# Patient Record
Sex: FEMALE | Race: BLACK OR AFRICAN AMERICAN | Employment: OTHER | URBAN - METROPOLITAN AREA
[De-identification: names, ages, dates, MRNs, and addresses within clinical notes are randomized per-mention and may not be internally consistent; named-entity substitution may affect disease eponyms.]

---

## 2017-09-04 ENCOUNTER — HOSPITAL ENCOUNTER (EMERGENCY)
Age: 62
Discharge: HOME | End: 2017-09-04
Attending: EMERGENCY MEDICINE
Payer: COMMERCIAL

## 2017-09-04 ENCOUNTER — APPOINTMENT (OUTPATIENT)
Dept: GENERAL RADIOLOGY | Age: 62
End: 2017-09-04
Attending: EMERGENCY MEDICINE
Payer: COMMERCIAL

## 2017-09-04 ENCOUNTER — APPOINTMENT (OUTPATIENT)
Dept: NUCLEAR MEDICINE | Age: 62
End: 2017-09-04
Attending: EMERGENCY MEDICINE
Payer: COMMERCIAL

## 2017-09-04 VITALS
HEART RATE: 98 BPM | BODY MASS INDEX: 31.1 KG/M2 | SYSTOLIC BLOOD PRESSURE: 135 MMHG | WEIGHT: 182.2 LBS | TEMPERATURE: 98.2 F | DIASTOLIC BLOOD PRESSURE: 63 MMHG | HEIGHT: 64 IN | OXYGEN SATURATION: 95 % | RESPIRATION RATE: 17 BRPM

## 2017-09-04 DIAGNOSIS — G89.29 CHRONIC BILATERAL LOW BACK PAIN WITHOUT SCIATICA: ICD-10-CM

## 2017-09-04 DIAGNOSIS — M54.50 CHRONIC BILATERAL LOW BACK PAIN WITHOUT SCIATICA: ICD-10-CM

## 2017-09-04 DIAGNOSIS — R07.9 CHEST PAIN, UNSPECIFIED TYPE: Primary | ICD-10-CM

## 2017-09-04 DIAGNOSIS — R73.9 HYPERGLYCEMIA: ICD-10-CM

## 2017-09-04 LAB
ALBUMIN SERPL-MCNC: 3.2 G/DL (ref 3.5–5)
ALBUMIN/GLOB SERPL: 0.7 {RATIO} (ref 1.1–2.2)
ALP SERPL-CCNC: 181 U/L (ref 45–117)
ALT SERPL-CCNC: 18 U/L (ref 12–78)
ANION GAP SERPL CALC-SCNC: 11 MMOL/L (ref 5–15)
APPEARANCE UR: CLEAR
AST SERPL-CCNC: 8 U/L (ref 15–37)
ATRIAL RATE: 106 BPM
BACTERIA URNS QL MICRO: NEGATIVE /HPF
BASOPHILS # BLD: 0 K/UL (ref 0–0.1)
BASOPHILS NFR BLD: 0 % (ref 0–1)
BILIRUB SERPL-MCNC: 0.3 MG/DL (ref 0.2–1)
BILIRUB UR QL: NEGATIVE
BUN SERPL-MCNC: 20 MG/DL (ref 6–20)
BUN/CREAT SERPL: 21 (ref 12–20)
CALCIUM SERPL-MCNC: 9.1 MG/DL (ref 8.5–10.1)
CALCULATED P AXIS, ECG09: 61 DEGREES
CALCULATED R AXIS, ECG10: -35 DEGREES
CALCULATED T AXIS, ECG11: 71 DEGREES
CHLORIDE SERPL-SCNC: 103 MMOL/L (ref 97–108)
CK MB CFR SERPL CALC: NORMAL % (ref 0–2.5)
CK MB CFR SERPL CALC: NORMAL % (ref 0–2.5)
CK MB SERPL-MCNC: <1 NG/ML (ref 5–25)
CK MB SERPL-MCNC: <1 NG/ML (ref 5–25)
CK SERPL-CCNC: 107 U/L (ref 26–192)
CK SERPL-CCNC: 108 U/L (ref 26–192)
CO2 SERPL-SCNC: 23 MMOL/L (ref 21–32)
COLOR UR: ABNORMAL
CREAT SERPL-MCNC: 0.94 MG/DL (ref 0.55–1.02)
D DIMER PPP FEU-MCNC: 0.85 MG/L FEU (ref 0–0.65)
DIAGNOSIS, 93000: NORMAL
EOSINOPHIL # BLD: 0.4 K/UL (ref 0–0.4)
EOSINOPHIL NFR BLD: 4 % (ref 0–7)
EPITH CASTS URNS QL MICRO: ABNORMAL /LPF
ERYTHROCYTE [DISTWIDTH] IN BLOOD BY AUTOMATED COUNT: 13.9 % (ref 11.5–14.5)
GLOBULIN SER CALC-MCNC: 4.5 G/DL (ref 2–4)
GLUCOSE SERPL-MCNC: 316 MG/DL (ref 65–100)
GLUCOSE UR STRIP.AUTO-MCNC: >1000 MG/DL
HCT VFR BLD AUTO: 36.8 % (ref 35–47)
HGB BLD-MCNC: 12.1 G/DL (ref 11.5–16)
HGB UR QL STRIP: ABNORMAL
HYALINE CASTS URNS QL MICRO: ABNORMAL /LPF (ref 0–5)
KETONES UR QL STRIP.AUTO: NEGATIVE MG/DL
LEUKOCYTE ESTERASE UR QL STRIP.AUTO: NEGATIVE
LYMPHOCYTES # BLD: 3.9 K/UL (ref 0.8–3.5)
LYMPHOCYTES NFR BLD: 47 % (ref 12–49)
MCH RBC QN AUTO: 30.9 PG (ref 26–34)
MCHC RBC AUTO-ENTMCNC: 32.9 G/DL (ref 30–36.5)
MCV RBC AUTO: 94.1 FL (ref 80–99)
MONOCYTES # BLD: 0.5 K/UL (ref 0–1)
MONOCYTES NFR BLD: 6 % (ref 5–13)
NEUTS SEG # BLD: 3.7 K/UL (ref 1.8–8)
NEUTS SEG NFR BLD: 43 % (ref 32–75)
NITRITE UR QL STRIP.AUTO: NEGATIVE
P-R INTERVAL, ECG05: 142 MS
PH UR STRIP: 6.5 [PH] (ref 5–8)
PLATELET # BLD AUTO: 314 K/UL (ref 150–400)
POTASSIUM SERPL-SCNC: 4.1 MMOL/L (ref 3.5–5.1)
PROT SERPL-MCNC: 7.7 G/DL (ref 6.4–8.2)
PROT UR STRIP-MCNC: 100 MG/DL
Q-T INTERVAL, ECG07: 364 MS
QRS DURATION, ECG06: 86 MS
QTC CALCULATION (BEZET), ECG08: 483 MS
RBC # BLD AUTO: 3.91 M/UL (ref 3.8–5.2)
RBC #/AREA URNS HPF: ABNORMAL /HPF (ref 0–5)
SODIUM SERPL-SCNC: 137 MMOL/L (ref 136–145)
SP GR UR REFRACTOMETRY: 1.02 (ref 1–1.03)
TROPONIN I SERPL-MCNC: <0.04 NG/ML
TROPONIN I SERPL-MCNC: <0.04 NG/ML
UA: UC IF INDICATED,UAUC: ABNORMAL
UROBILINOGEN UR QL STRIP.AUTO: 1 EU/DL (ref 0.2–1)
VENTRICULAR RATE, ECG03: 106 BPM
WBC # BLD AUTO: 8.5 K/UL (ref 3.6–11)
WBC URNS QL MICRO: ABNORMAL /HPF (ref 0–4)

## 2017-09-04 PROCEDURE — 36415 COLL VENOUS BLD VENIPUNCTURE: CPT | Performed by: EMERGENCY MEDICINE

## 2017-09-04 PROCEDURE — 93005 ELECTROCARDIOGRAM TRACING: CPT

## 2017-09-04 PROCEDURE — 96374 THER/PROPH/DIAG INJ IV PUSH: CPT

## 2017-09-04 PROCEDURE — A9540 TC99M MAA: HCPCS

## 2017-09-04 PROCEDURE — 72100 X-RAY EXAM L-S SPINE 2/3 VWS: CPT

## 2017-09-04 PROCEDURE — 85025 COMPLETE CBC W/AUTO DIFF WBC: CPT | Performed by: EMERGENCY MEDICINE

## 2017-09-04 PROCEDURE — 74011250636 HC RX REV CODE- 250/636: Performed by: EMERGENCY MEDICINE

## 2017-09-04 PROCEDURE — 99285 EMERGENCY DEPT VISIT HI MDM: CPT

## 2017-09-04 PROCEDURE — 71020 XR CHEST PA LAT: CPT

## 2017-09-04 PROCEDURE — 81001 URINALYSIS AUTO W/SCOPE: CPT | Performed by: EMERGENCY MEDICINE

## 2017-09-04 PROCEDURE — 85379 FIBRIN DEGRADATION QUANT: CPT | Performed by: EMERGENCY MEDICINE

## 2017-09-04 PROCEDURE — 82550 ASSAY OF CK (CPK): CPT | Performed by: EMERGENCY MEDICINE

## 2017-09-04 PROCEDURE — 84484 ASSAY OF TROPONIN QUANT: CPT | Performed by: EMERGENCY MEDICINE

## 2017-09-04 PROCEDURE — 80053 COMPREHEN METABOLIC PANEL: CPT | Performed by: EMERGENCY MEDICINE

## 2017-09-04 RX ORDER — HYDROMORPHONE HYDROCHLORIDE 1 MG/ML
0.5 INJECTION, SOLUTION INTRAMUSCULAR; INTRAVENOUS; SUBCUTANEOUS
Status: COMPLETED | OUTPATIENT
Start: 2017-09-04 | End: 2017-09-04

## 2017-09-04 RX ORDER — TRAMADOL HYDROCHLORIDE 50 MG/1
50 TABLET ORAL
Qty: 10 TAB | Refills: 0 | Status: SHIPPED | OUTPATIENT
Start: 2017-09-04 | End: 2017-09-14

## 2017-09-04 RX ADMIN — HYDROMORPHONE HYDROCHLORIDE 0.5 MG: 1 INJECTION, SOLUTION INTRAMUSCULAR; INTRAVENOUS; SUBCUTANEOUS at 02:37

## 2017-09-04 NOTE — ED TRIAGE NOTES
Triage: Patient arrives ambulatory from home with c/o muscle pain on right side of back from shoulders down to flank, patient reports pain is worse with inspiration and better with palpation. Patient also states that on the walk up here she \"got some chest pain\". Hx MI as reported by patient \"a while ago\".  Dr. Diana Lopez is pts cardiologist.

## 2017-09-04 NOTE — ED PROVIDER NOTES
HPI Comments: 60 y.o. female with past medical history significant for DM, HTN, Asthma, CAD, PAD with stents placed to legs Jerald Morales, 2017), Hysterectomy who presents from home accompanied by siblings with chief complaint of back pain. Pt reports \"couple months\" hx right mid to low back pain accompanied by b/l leg pain and numbness to feet. Pt states that the numbness has been gradually radiating up her lower legs. Pt states that she has been evaluated by her PCP for this numbness in the past and it was deamed diabetes related. Pt further notes, while en route to ED she developed left sided chest pain beneath her left breast of which is exacerbated with deep inhalation and movement. She denies hx of similar symptoms. Pt denies abdominal pain, nausea, vomiting, diarrhea or fever. No significant SOB. No known injury. She notes hx of stool incontinence of which surgical intervention has been discussed with her by PCP. She denies an exacerbation of these symptoms. No urinary incontience, difficulty urinating, dysuria, hematuria or anyo ther acute medical concerns at this time. PCP: Norma Beverly MD    Note written by Pradip Angela, as dictated by Eder Carbajal MD 12:54 AM    The history is provided by the patient. No  was used. Past Medical History:   Diagnosis Date    Asthma     CAD (coronary artery disease)     Diabetes ([facility] [address]     Hypertension        Past Surgical History:   Procedure Laterality Date    DELIVERY       HX CORONARY STENT PLACEMENT      HX HYSTERECTOMY           History reviewed. No pertinent family history. Social History     Social History    Marital status: SINGLE     Spouse name: N/A    Number of children: N/A    Years of education: N/A     Occupational History    Not on file.      Social History Main Topics    Smoking status: Current Every Day Smoker     Packs/day: 0.25    Smokeless tobacco: Not on file    Alcohol use No    Drug use: Not on file    Sexual activity: Not on file     Other Topics Concern    Not on file     Social History Narrative         ALLERGIES: Ivp dye [fd and c blue no. 1]    Review of Systems   Constitutional: Negative for fever. Respiratory: Negative for shortness of breath. Cardiovascular: Positive for chest pain (left, beneath breast). Gastrointestinal: Negative for abdominal pain, blood in stool, constipation, diarrhea, nausea and vomiting. Genitourinary: Negative for difficulty urinating. Musculoskeletal: Positive for back pain. Neurological: Positive for numbness (feet).        Vitals:    09/04/17 0016   BP: 178/90   Pulse: (!) 107   Resp: 18   Temp: 99 °F (37.2 °C)   SpO2: 99%   Weight: 82.6 kg (182 lb 3.2 oz)   Height: 5' 4\" (1.626 m)            Physical Exam   Physical Examination: General appearance - alert, well appearing, and in no distress, oriented to person, place, and time and normal appearing weight  Eyes - pupils equal and reactive, extraocular eye movements intact  Neck - supple, no significant adenopathy  Chest - clear to auscultation, no wheezes, rales or rhonchi, symmetric air entry, mild tenderness to left lower anterior chest wall, no rash, no crepitus or subcutaneous air  Heart - normal rate, regular rhythm, normal S1, S2, no murmurs, rubs, clicks or gallops  Abdomen - soft, nontender, nondistended, no masses or organomegaly  Back exam - full range of motion, no midline spinal tenderness, mild right paraspinal muscle tenderness to lumbar region  Neurological - alert, oriented, normal speech, no focal findings or movement disorder noted  Musculoskeletal - no joint tenderness, deformity or swelling  Extremities - diminished pulses to b/l LE, easily detectable by doppler, no pedal edema, no clubbing or cyanosis  Skin - normal coloration and turgor, no rashes, no suspicious skin lesions noted  MDM  Number of Diagnoses or Management Options     Amount and/or Complexity of Data Reviewed  Clinical lab tests: ordered and reviewed  Tests in the radiology section of CPT®: ordered and reviewed  Decide to obtain previous medical records or to obtain history from someone other than the patient: yes  Obtain history from someone other than the patient: yes (family)  Review and summarize past medical records: yes  Independent visualization of images, tracings, or specimens: yes    Patient Progress  Patient progress: improved    ED Course       Procedures    EKG interpretation: (Preliminary)  Rhythm: sinus tachycardia; and regular . Rate (approx.): 106; Axis: left axis deviation; DE interval: normal; QRS interval: normal ; ST/T wave: non-specific changes; no changes from EKG 6/6/2016    Pt feeling better. VSS. Discussed with radiology, LAURI Montano low prob for PE. Will d/c with pcp f/u.

## 2017-09-04 NOTE — DISCHARGE INSTRUCTIONS
Back Pain: Care Instructions  Your Care Instructions    Back pain has many possible causes. It is often related to problems with muscles and ligaments of the back. It may also be related to problems with the nerves, discs, or bones of the back. Moving, lifting, standing, sitting, or sleeping in an awkward way can strain the back. Sometimes you don't notice the injury until later. Arthritis is another common cause of back pain. Although it may hurt a lot, back pain usually improves on its own within several weeks. Most people recover in 12 weeks or less. Using good home treatment and being careful not to stress your back can help you feel better sooner. Follow-up care is a key part of your treatment and safety. Be sure to make and go to all appointments, and call your doctor if you are having problems. Its also a good idea to know your test results and keep a list of the medicines you take. How can you care for yourself at home? · Sit or lie in positions that are most comfortable and reduce your pain. Try one of these positions when you lie down:  ¨ Lie on your back with your knees bent and supported by large pillows. ¨ Lie on the floor with your legs on the seat of a sofa or chair. Blima Naegeli on your side with your knees and hips bent and a pillow between your legs. ¨ Lie on your stomach if it does not make pain worse. · Do not sit up in bed, and avoid soft couches and twisted positions. Bed rest can help relieve pain at first, but it delays healing. Avoid bed rest after the first day of back pain. · Change positions every 30 minutes. If you must sit for long periods of time, take breaks from sitting. Get up and walk around, or lie in a comfortable position. · Try using a heating pad on a low or medium setting for 15 to 20 minutes every 2 or 3 hours. Try a warm shower in place of one session with the heating pad. · You can also try an ice pack for 10 to 15 minutes every 2 to 3 hours.  Put a thin cloth between the ice pack and your skin. · Take pain medicines exactly as directed. ¨ If the doctor gave you a prescription medicine for pain, take it as prescribed. ¨ If you are not taking a prescription pain medicine, ask your doctor if you can take an over-the-counter medicine. · Take short walks several times a day. You can start with 5 to 10 minutes, 3 or 4 times a day, and work up to longer walks. Walk on level surfaces and avoid hills and stairs until your back is better. · Return to work and other activities as soon as you can. Continued rest without activity is usually not good for your back. · To prevent future back pain, do exercises to stretch and strengthen your back and stomach. Learn how to use good posture, safe lifting techniques, and proper body mechanics. When should you call for help? Call your doctor now or seek immediate medical care if:  · You have new or worsening numbness in your legs. · You have new or worsening weakness in your legs. (This could make it hard to stand up.)  · You lose control of your bladder or bowels. Watch closely for changes in your health, and be sure to contact your doctor if:  · Your pain gets worse. · You are not getting better after 2 weeks. Where can you learn more? Go to http://percy-jeremie.info/. Enter S836 in the search box to learn more about \"Back Pain: Care Instructions. \"  Current as of: March 21, 2017  Content Version: 11.3  © 2358-4352 [employer]. Care instructions adapted under license by [employer] (which disclaims liability or warranty for this information). If you have questions about a medical condition or this instruction, always ask your healthcare professional. [address] any warranty or liability for your use of this information. Chest Pain: Care Instructions  Your Care Instructions  There are many things that can cause chest pain.  Some are not serious and will get better on their own in a few days. But some kinds of chest pain need more testing and treatment. Your doctor may have recommended a follow-up visit in the next 8 to 12 hours. If you are not getting better, you may need more tests or treatment. Even though your doctor has released you, you still need to watch for any problems. The doctor carefully checked you, but sometimes problems can develop later. If you have new symptoms or if your symptoms do not get better, get medical care right away. If you have worse or different chest pain or pressure that lasts more than 5 minutes or you passed out (lost consciousness), call 911 or seek other emergency help right away. A medical visit is only one step in your treatment. Even if you feel better, you still need to do what your doctor recommends, such as going to all suggested follow-up appointments and taking medicines exactly as directed. This will help you recover and help prevent future problems. How can you care for yourself at home? · Rest until you feel better. · Take your medicine exactly as prescribed. Call your doctor if you think you are having a problem with your medicine. · Do not drive after taking a prescription pain medicine. When should you call for help? Call 911 if:  · You passed out (lost consciousness). · You have severe difficulty breathing. · You have symptoms of a heart attack. These may include:  ¨ Chest pain or pressure, or a strange feeling in your chest.  ¨ Sweating. ¨ Shortness of breath. ¨ Nausea or vomiting. ¨ Pain, pressure, or a strange feeling in your back, neck, jaw, or upper belly or in one or both shoulders or arms. ¨ Lightheadedness or sudden weakness. ¨ A fast or irregular heartbeat. After you call 911, the  may tell you to chew 1 adult-strength or 2 to 4 low-dose aspirin. Wait for an ambulance. Do not try to drive yourself. Call your doctor today if:  · You have any trouble breathing.   · Your chest pain gets worse. · You are dizzy or lightheaded, or you feel like you may faint. · You are not getting better as expected. · You are having new or different chest pain. Where can you learn more? Go to http://percy-jeremie.info/. Enter A120 in the search box to learn more about \"Chest Pain: Care Instructions. \"  Current as of: March 20, 2017  Content Version: 11.3  © 1172-7200 [employer]. Care instructions adapted under license by [employer] (which disclaims liability or warranty for this information). If you have questions about a medical condition or this instruction, always ask your healthcare professional. [place] [address] any warranty or liability for your use of this information. Back Pain, Emergency or Urgent Symptoms: Care Instructions  Your Care Instructions  Many people have back pain at one time or another. In most cases, pain gets better with self-care that includes over-the-counter pain medicine, ice, heat, and exercises. Unless you have symptoms of a severe injury or heart attack, you may be able to give yourself a few days before you call a doctor. But some back problems are very serious. Do not ignore symptoms that need to be checked right away. Follow-up care is a key part of your treatment and safety. Be sure to make and go to all appointments, and call your doctor if you are having problems. It's also a good idea to know your test results and keep a list of the medicines you take. How can you care for yourself at home? · Sit or lie in positions that are most comfortable and that reduce your pain. Try one of these positions when you lie down:  ¨ Lie on your back with your knees bent and supported by large pillows. ¨ Lie on the floor with your legs on the seat of a sofa or chair. Sonda Katie on your side with your knees and hips bent and a pillow between your legs. ¨ Lie on your stomach if it does not make pain worse.   · Do not sit up in bed, and avoid soft couches and twisted positions. Bed rest can help relieve pain at first, but it delays healing. Avoid bed rest after the first day. · Change positions every 30 minutes. If you must sit for long periods of time, take breaks from sitting. Get up and walk around, or lie flat. · Try using a heating pad on a low or medium setting, for 15 to 20 minutes every 2 or 3 hours. Try a warm shower in place of one session with the heating pad. You can also buy single-use heat wraps that last up to 8 hours. You can also try ice or cold packs on your back for 10 to 20 minutes at a time, several times a day. (Put a thin cloth between the ice pack and your skin.) This reduces pain and makes it easier to be active and exercise. · Take pain medicines exactly as directed. ¨ If the doctor gave you a prescription medicine for pain, take it as prescribed. ¨ If you are not taking a prescription pain medicine, ask your doctor if you can take an over-the-counter medicine. When should you call for help? Call 911 anytime you think you may need emergency care. For example, call if:  · You are unable to move a leg at all. · You have back pain with severe belly pain. · You have symptoms of a heart attack. These may include:  ¨ Chest pain or pressure, or a strange feeling in the chest.  ¨ Sweating. ¨ Shortness of breath. ¨ Nausea or vomiting. ¨ Pain, pressure, or a strange feeling in the back, neck, jaw, or upper belly or in one or both shoulders or arms. ¨ Lightheadedness or sudden weakness. ¨ A fast or irregular heartbeat. After you call 911, the  may tell you to chew 1 adult-strength or 2 to 4 low-dose aspirin. Wait for an ambulance. Do not try to drive yourself. Call your doctor now or seek immediate medical care if:  · You have new or worse symptoms in your arms, legs, chest, belly, or buttocks. Symptoms may include:  ¨ Numbness or tingling. ¨ Weakness. ¨ Pain.   · You lose bladder or bowel control. · You have back pain and:  ¨ You have injured your back while lifting or doing some other activity. Call if the pain is severe, has not gone away after 1 or 2 days, and you cannot do your normal daily activities. ¨ You have had a back injury before that needed treatment. ¨ Your pain has lasted longer than 4 weeks. ¨ You have had weight loss you cannot explain. ¨ You are age 48 or older. ¨ You have cancer now or have had it before. Watch closely for changes in your health, and be sure to contact your doctor if you are not getting better as expected. Where can you learn more? Go to http://percyNetadminjeremie.info/. Enter M033 in the search box to learn more about \"Back Pain, Emergency or Urgent Symptoms: Care Instructions. \"  Current as of: March 20, 2017  Content Version: 11.3  © 3150-1095 [employer]. Care instructions adapted under license by [employer] (which disclaims liability or warranty for this information). If you have questions about a medical condition or this instruction, always ask your healthcare professional. [address] any warranty or liability for your use of this information. Learning About High Blood Sugar  What is high blood sugar? Your body turns the food you eat into glucose (sugar), which it uses for energy. But if your body isn't able to use the sugar right away, it can build up in your blood and lead to high blood sugar. When the amount of sugar in your blood stays too high for too much of the time, you may have diabetes. Diabetes is a disease that can cause serious health problems. The good news is that lifestyle changes may help you get your blood sugar back to normal and avoid or delay diabetes. What causes high blood sugar? Sugar (glucose) can build up in your blood if you:  · Are overweight. · Have a family history of diabetes. · Take certain medicines, such as steroids.   What are the symptoms? Having high blood sugar may not cause any symptoms at all. Or it may make you feel very thirsty or very hungry. You may also urinate more often than usual, have blurry vision, or lose weight without trying. How is high blood sugar treated? You can take steps to lower your blood sugar level if you understand what makes it get higher. Your doctor may want you to learn how to test your blood sugar level at home. Then you can see how illness, stress, or different kinds of food or medicine raise or lower your blood sugar level. Other tests may be needed to see if you have diabetes. How can you prevent high blood sugar? · Watch your weight. If you're overweight, losing just a small amount of weight may help. Reducing fat around your waist is most important. · Limit the amount of calories, sweets, and unhealthy fat you eat. Ask your doctor if a dietitian can help you. A registered dietitian can help you create meal plans that fit your lifestyle. · Get at least 30 minutes of exercise on most days of the week. Exercise helps control your blood sugar. It also helps you maintain a healthy weight. Walking is a good choice. You also may want to do other activities, such as running, swimming, cycling, or playing tennis or team sports. · If your doctor prescribed medicines, take them exactly as prescribed. Call your doctor if you think you are having a problem with your medicine. You will get more details on the specific medicines your doctor prescribes. Follow-up care is a key part of your treatment and safety. Be sure to make and go to all appointments, and call your doctor if you are having problems. It's also a good idea to know your test results and keep a list of the medicines you take. Where can you learn more? Go to http://percy-jeremie.info/. Enter O108 in the search box to learn more about \"Learning About High Blood Sugar. \"  Current as of: March 13, 2017  Content Version: 11.3  © 4138-8825 [employer]. Care instructions adapted under license by [employer] (which disclaims liability or warranty for this information). If you have questions about a medical condition or this instruction, always ask your healthcare professional. [address] any warranty or liability for your use of this information. We hope that we have addressed all of your medical concerns. The examination and treatment you received in the Emergency Department were for an emergent problem and were not intended as complete care. It is important that you follow up with your healthcare provider(s) for ongoing care. If your symptoms worsen or do not improve as expected, and you are unable to reach your usual health care provider(s), you should return to the Emergency Department. Today's healthcare is undergoing tremendous change, and patient satisfaction surveys are one of the many tools to assess the quality of medical care. You may receive a survey from the [employer] regarding your experience in the Emergency Department. I hope that your experience has been completely positive, particularly the medical care that I provided. As such, please participate in the survey; anything less than excellent does not meet my expectations or intentions. [address] [facility] and [address] [place] [address] participate in nationally recognized quality of care measures. If your blood pressure is greater than 120/80, as reported below, we urge that you seek medical care to address the potential of high blood pressure, commonly known as hypertension. Hypertension can be hereditary or can be caused by certain medical conditions, pain, stress, or \"white coat syndrome. \"       Please make an appointment with your health care provider(s) for follow up of your Emergency Department visit.        VITALS:   Patient Vitals for the past 8 hrs:   Temp Pulse Resp BP SpO2   09/04/17 0230 98.2 °F (36.8 °C) 98 22 143/80 96 %   09/04/17 0200 - 97 22 148/80 97 %   09/04/17 0145 - (!) 101 21 - 97 %   09/04/17 0130 - (!) 102 21 159/88 97 %   09/04/17 0117 - (!) 104 18 162/82 99 %   09/04/17 0030 - (!) 105 17 (!) 171/94 98 %   09/04/17 0016 99 °F (37.2 °C) (!) 107 18 178/90 99 %   09/04/17 0015 - - - 178/90 94 %          Thank you for allowing us to provide you with medical care today. We realize that you have many choices for your emergency care needs. Please choose us in the future for any continued health care needs. Yu Verma Marylene Nixon, [address] [place] [address] 20.   Office: [phone]            Recent Results (from the past 24 hour(s))   EKG, 12 LEAD, INITIAL    Collection Time: 09/04/17 12:17 AM   Result Value Ref Range    Ventricular Rate 106 BPM    Atrial Rate 106 BPM    P-R Interval 142 ms    QRS Duration 86 ms    Q-T Interval 364 ms    QTC Calculation (Bezet) 483 ms    Calculated P Axis 61 degrees    Calculated R Axis -35 degrees    Calculated T Axis 71 degrees    Diagnosis       Sinus tachycardia  Left axis deviation  When compared with ECG of 06-JUN-2016 20:15,  No significant change was found     CBC WITH AUTOMATED DIFF    Collection Time: 09/04/17 12:20 AM   Result Value Ref Range    WBC 8.5 3.6 - 11.0 K/uL    RBC 3.91 3.80 - 5.20 M/uL    HGB 12.1 11.5 - 16.0 g/dL    HCT 36.8 35.0 - 47.0 %    MCV 94.1 80.0 - 99.0 FL    MCH 30.9 26.0 - 34.0 PG    MCHC 32.9 30.0 - 36.5 g/dL    RDW 13.9 11.5 - 14.5 %    PLATELET 896 479 - 496 K/uL    NEUTROPHILS 43 32 - 75 %    LYMPHOCYTES 47 12 - 49 %    MONOCYTES 6 5 - 13 %    EOSINOPHILS 4 0 - 7 %    BASOPHILS 0 0 - 1 %    ABS. NEUTROPHILS 3.7 1.8 - 8.0 K/UL    ABS. LYMPHOCYTES 3.9 (H) 0.8 - 3.5 K/UL    ABS. MONOCYTES 0.5 0.0 - 1.0 K/UL    ABS. EOSINOPHILS 0.4 0.0 - 0.4 K/UL    ABS.  BASOPHILS 0.0 0.0 - 0.1 K/UL   METABOLIC PANEL, COMPREHENSIVE    Collection Time: 09/04/17 12:20 AM   Result Value Ref Range    Sodium 137 136 - 145 mmol/L    Potassium 4.1 3.5 - 5.1 mmol/L    Chloride 103 97 - 108 mmol/L    CO2 23 21 - 32 mmol/L    Anion gap 11 5 - 15 mmol/L    Glucose 316 (H) 65 - 100 mg/dL    BUN 20 6 - 20 MG/DL    Creatinine 0.94 0.55 - 1.02 MG/DL    BUN/Creatinine ratio 21 (H) 12 - 20      GFR est AA >60 >60 ml/min/1.73m2    GFR est non-AA >60 >60 ml/min/1.73m2    Calcium 9.1 8.5 - 10.1 MG/DL    Bilirubin, total 0.3 0.2 - 1.0 MG/DL    ALT (SGPT) 18 12 - 78 U/L    AST (SGOT) 8 (L) 15 - 37 U/L    Alk.  phosphatase 181 (H) 45 - 117 U/L    Protein, total 7.7 6.4 - 8.2 g/dL    Albumin 3.2 (L) 3.5 - 5.0 g/dL    Globulin 4.5 (H) 2.0 - 4.0 g/dL    A-G Ratio 0.7 (L) 1.1 - 2.2     TROPONIN I    Collection Time: 09/04/17 12:20 AM   Result Value Ref Range    Troponin-I, Qt. <0.04 <0.05 ng/mL   D DIMER    Collection Time: 09/04/17 12:20 AM   Result Value Ref Range    D-dimer 0.85 (H) 0.00 - 0.65 mg/L FEU   CK W/ CKMB & INDEX    Collection Time: 09/04/17 12:20 AM   Result Value Ref Range     26 - 192 U/L    CK - MB <1.0 <3.6 NG/ML    CK-MB Index Cannot be calculated 0 - 2.5     URINALYSIS W/ REFLEX CULTURE    Collection Time: 09/04/17  1:16 AM   Result Value Ref Range    Color YELLOW/STRAW      Appearance CLEAR CLEAR      Specific gravity 1.016 1.003 - 1.030      pH (UA) 6.5 5.0 - 8.0      Protein 100 (A) NEG mg/dL    Glucose >1000 (A) NEG mg/dL    Ketone NEGATIVE  NEG mg/dL    Bilirubin NEGATIVE  NEG      Blood SMALL (A) NEG      Urobilinogen 1.0 0.2 - 1.0 EU/dL    Nitrites NEGATIVE  NEG      Leukocyte Esterase NEGATIVE  NEG      WBC 0-4 0 - 4 /hpf    RBC 0-5 0 - 5 /hpf    Epithelial cells FEW FEW /lpf    Bacteria NEGATIVE  NEG /hpf    UA:UC IF INDICATED CULTURE NOT INDICATED BY UA RESULT CNI      Hyaline cast 0-2 0 - 5 /lpf   CK W/ CKMB & INDEX    Collection Time: 09/04/17  3:55 AM   Result Value Ref Range     26 - 192 U/L    CK - MB <1.0 <3.6 NG/ML    CK-MB Index Cannot be calculated 0 - 2.5     TROPONIN I Collection Time: 09/04/17  3:55 AM   Result Value Ref Range    Troponin-I, Qt. <0.04 <0.05 ng/mL       Xr Chest Pa Lat    Result Date: 9/4/2017  EXAM:  XR CHEST PA LAT INDICATION:   Chest Pain COMPARISON: Chest x-ray 6/6/2016. CTA thorax 2/20/2014. FINDINGS: PA and lateral radiographs of the chest demonstrate clear lungs. The cardiac and mediastinal contours and pulmonary vascularity are normal.  The bones and soft tissues are within normal limits apart from mild degenerative spine change. IMPRESSION: Acute findings. Xr Spine Lumb 2 Or 3 V    Result Date: 9/4/2017  EXAM:  XR SPINE LUMB 2 OR 3 V INDICATION:   back pain COMPARISON: None. FINDINGS: AP, lateral and spot lateral views of the lumbar spine demonstrate normal alignment. The vertebral body heights are well-maintained. There is preservation of disc height with mild facet arthropathy. There is diffuse osteopenia. Vascular stents are seen overlying the L4 level and left sacrum. Soft tissues appear unremarkable. There is no fracture, subluxation or other abnormality. IMPRESSION:  No acute findings.

## 2018-03-15 ENCOUNTER — APPOINTMENT (OUTPATIENT)
Dept: CT IMAGING | Age: 63
DRG: 193 | End: 2018-03-15
Attending: FAMILY MEDICINE
Payer: COMMERCIAL

## 2018-03-15 ENCOUNTER — HOSPITAL ENCOUNTER (INPATIENT)
Age: 63
LOS: 4 days | Discharge: HOME | DRG: 193 | End: 2018-03-19
Attending: EMERGENCY MEDICINE | Admitting: FAMILY MEDICINE
Payer: COMMERCIAL

## 2018-03-15 ENCOUNTER — APPOINTMENT (OUTPATIENT)
Dept: GENERAL RADIOLOGY | Age: 63
DRG: 193 | End: 2018-03-15
Attending: PHYSICIAN ASSISTANT
Payer: COMMERCIAL

## 2018-03-15 DIAGNOSIS — F17.200 SMOKING: ICD-10-CM

## 2018-03-15 DIAGNOSIS — J44.1 ACUTE EXACERBATION OF CHRONIC OBSTRUCTIVE PULMONARY DISEASE (COPD) (HCC): Primary | ICD-10-CM

## 2018-03-15 PROBLEM — R65.10 SIRS (SYSTEMIC INFLAMMATORY RESPONSE SYNDROME) (HCC): Status: ACTIVE | Noted: 2018-03-15

## 2018-03-15 PROBLEM — J45.901 ASTHMA EXACERBATION: Status: ACTIVE | Noted: 2018-03-15

## 2018-03-15 PROBLEM — R50.9 FEVER: Status: ACTIVE | Noted: 2018-03-15

## 2018-03-15 PROBLEM — R60.0 BILATERAL LEG EDEMA: Status: ACTIVE | Noted: 2018-03-15

## 2018-03-15 LAB
ALBUMIN SERPL-MCNC: 2.6 G/DL (ref 3.5–5)
ALBUMIN/GLOB SERPL: 0.6 {RATIO} (ref 1.1–2.2)
ALP SERPL-CCNC: 137 U/L (ref 45–117)
ALT SERPL-CCNC: 20 U/L (ref 12–78)
ANION GAP SERPL CALC-SCNC: 7 MMOL/L (ref 5–15)
APPEARANCE UR: CLEAR
ARTERIAL PATENCY WRIST A: YES
AST SERPL-CCNC: 28 U/L (ref 15–37)
BACTERIA URNS QL MICRO: ABNORMAL /HPF
BASE EXCESS BLD CALC-SCNC: 0 MMOL/L
BASOPHILS # BLD: 0.1 K/UL (ref 0–0.1)
BASOPHILS NFR BLD: 1 % (ref 0–1)
BDY SITE: ABNORMAL
BILIRUB DIRECT SERPL-MCNC: <0.1 MG/DL (ref 0–0.2)
BILIRUB SERPL-MCNC: 0.3 MG/DL (ref 0.2–1)
BILIRUB UR QL: NEGATIVE
BNP SERPL-MCNC: 897 PG/ML (ref 0–125)
BUN SERPL-MCNC: 16 MG/DL (ref 6–20)
BUN/CREAT SERPL: 15 (ref 12–20)
CALCIUM SERPL-MCNC: 8.2 MG/DL (ref 8.5–10.1)
CHLORIDE SERPL-SCNC: 103 MMOL/L (ref 97–108)
CO2 SERPL-SCNC: 28 MMOL/L (ref 21–32)
COLOR UR: ABNORMAL
CREAT SERPL-MCNC: 1.08 MG/DL (ref 0.55–1.02)
DIFFERENTIAL METHOD BLD: ABNORMAL
EOSINOPHIL # BLD: 0.2 K/UL (ref 0–0.4)
EOSINOPHIL NFR BLD: 2 % (ref 0–7)
EPITH CASTS URNS QL MICRO: ABNORMAL /LPF
ERYTHROCYTE [DISTWIDTH] IN BLOOD BY AUTOMATED COUNT: 14.1 % (ref 11.5–14.5)
GAS FLOW.O2 O2 DELIVERY SYS: ABNORMAL L/MIN
GLOBULIN SER CALC-MCNC: 4.6 G/DL (ref 2–4)
GLUCOSE BLD STRIP.AUTO-MCNC: 168 MG/DL (ref 65–100)
GLUCOSE SERPL-MCNC: 227 MG/DL (ref 65–100)
GLUCOSE UR STRIP.AUTO-MCNC: 250 MG/DL
HCO3 BLD-SCNC: 23.9 MMOL/L (ref 22–26)
HCT VFR BLD AUTO: 34.6 % (ref 35–47)
HGB BLD-MCNC: 11.5 G/DL (ref 11.5–16)
HGB UR QL STRIP: ABNORMAL
IMM GRANULOCYTES # BLD: 0 K/UL (ref 0–0.04)
IMM GRANULOCYTES NFR BLD AUTO: 0 % (ref 0–0.5)
KETONES UR QL STRIP.AUTO: NEGATIVE MG/DL
LACTATE SERPL-SCNC: 2 MMOL/L (ref 0.4–2)
LEUKOCYTE ESTERASE UR QL STRIP.AUTO: NEGATIVE
LYMPHOCYTES # BLD: 1.3 K/UL (ref 0.8–3.5)
LYMPHOCYTES NFR BLD: 16 % (ref 12–49)
MCH RBC QN AUTO: 32.1 PG (ref 26–34)
MCHC RBC AUTO-ENTMCNC: 33.2 G/DL (ref 30–36.5)
MCV RBC AUTO: 96.6 FL (ref 80–99)
MONOCYTES # BLD: 0.6 K/UL (ref 0–1)
MONOCYTES NFR BLD: 8 % (ref 5–13)
NEUTS SEG # BLD: 5.9 K/UL (ref 1.8–8)
NEUTS SEG NFR BLD: 73 % (ref 32–75)
NITRITE UR QL STRIP.AUTO: NEGATIVE
NRBC # BLD: 0 K/UL (ref 0–0.01)
NRBC BLD-RTO: 0 PER 100 WBC
PCO2 BLD: 33.2 MMHG (ref 35–45)
PH BLD: 7.46 [PH] (ref 7.35–7.45)
PH UR STRIP: 6.5 [PH] (ref 5–8)
PLATELET # BLD AUTO: 264 K/UL (ref 150–400)
PMV BLD AUTO: 9.1 FL (ref 8.9–12.9)
PO2 BLD: 68 MMHG (ref 80–100)
POTASSIUM SERPL-SCNC: 4 MMOL/L (ref 3.5–5.1)
PROT SERPL-MCNC: 7.2 G/DL (ref 6.4–8.2)
PROT UR STRIP-MCNC: >300 MG/DL
RBC # BLD AUTO: 3.58 M/UL (ref 3.8–5.2)
RBC #/AREA URNS HPF: ABNORMAL /HPF (ref 0–5)
SAO2 % BLD: 94 % (ref 92–97)
SERVICE CMNT-IMP: ABNORMAL
SODIUM SERPL-SCNC: 138 MMOL/L (ref 136–145)
SP GR UR REFRACTOMETRY: 1.02 (ref 1–1.03)
SPECIMEN TYPE: ABNORMAL
TROPONIN I SERPL-MCNC: <0.04 NG/ML
UR CULT HOLD, URHOLD: NORMAL
UROBILINOGEN UR QL STRIP.AUTO: 2 EU/DL (ref 0.2–1)
WBC # BLD AUTO: 8 K/UL (ref 3.6–11)
WBC URNS QL MICRO: ABNORMAL /HPF (ref 0–4)
YEAST BUDDING URNS QL: PRESENT

## 2018-03-15 PROCEDURE — 71250 CT THORAX DX C-: CPT

## 2018-03-15 PROCEDURE — 82803 BLOOD GASES ANY COMBINATION: CPT

## 2018-03-15 PROCEDURE — 83880 ASSAY OF NATRIURETIC PEPTIDE: CPT | Performed by: EMERGENCY MEDICINE

## 2018-03-15 PROCEDURE — 74011250637 HC RX REV CODE- 250/637: Performed by: EMERGENCY MEDICINE

## 2018-03-15 PROCEDURE — 93005 ELECTROCARDIOGRAM TRACING: CPT

## 2018-03-15 PROCEDURE — 96374 THER/PROPH/DIAG INJ IV PUSH: CPT

## 2018-03-15 PROCEDURE — 83605 ASSAY OF LACTIC ACID: CPT | Performed by: EMERGENCY MEDICINE

## 2018-03-15 PROCEDURE — 74011250636 HC RX REV CODE- 250/636: Performed by: FAMILY MEDICINE

## 2018-03-15 PROCEDURE — 94640 AIRWAY INHALATION TREATMENT: CPT

## 2018-03-15 PROCEDURE — 80076 HEPATIC FUNCTION PANEL: CPT | Performed by: FAMILY MEDICINE

## 2018-03-15 PROCEDURE — 80048 BASIC METABOLIC PNL TOTAL CA: CPT | Performed by: PHYSICIAN ASSISTANT

## 2018-03-15 PROCEDURE — 71046 X-RAY EXAM CHEST 2 VIEWS: CPT

## 2018-03-15 PROCEDURE — 81001 URINALYSIS AUTO W/SCOPE: CPT | Performed by: EMERGENCY MEDICINE

## 2018-03-15 PROCEDURE — 65660000000 HC RM CCU STEPDOWN

## 2018-03-15 PROCEDURE — 36600 WITHDRAWAL OF ARTERIAL BLOOD: CPT

## 2018-03-15 PROCEDURE — 74011000250 HC RX REV CODE- 250: Performed by: EMERGENCY MEDICINE

## 2018-03-15 PROCEDURE — 96361 HYDRATE IV INFUSION ADD-ON: CPT

## 2018-03-15 PROCEDURE — 74011250636 HC RX REV CODE- 250/636: Performed by: EMERGENCY MEDICINE

## 2018-03-15 PROCEDURE — 36415 COLL VENOUS BLD VENIPUNCTURE: CPT | Performed by: EMERGENCY MEDICINE

## 2018-03-15 PROCEDURE — 87040 BLOOD CULTURE FOR BACTERIA: CPT | Performed by: EMERGENCY MEDICINE

## 2018-03-15 PROCEDURE — 84484 ASSAY OF TROPONIN QUANT: CPT | Performed by: PHYSICIAN ASSISTANT

## 2018-03-15 PROCEDURE — 77030029684 HC NEB SM VOL KT MONA -A

## 2018-03-15 PROCEDURE — 82962 GLUCOSE BLOOD TEST: CPT

## 2018-03-15 PROCEDURE — 93970 EXTREMITY STUDY: CPT

## 2018-03-15 PROCEDURE — 85025 COMPLETE CBC W/AUTO DIFF WBC: CPT | Performed by: PHYSICIAN ASSISTANT

## 2018-03-15 PROCEDURE — 99285 EMERGENCY DEPT VISIT HI MDM: CPT

## 2018-03-15 RX ORDER — BACLOFEN 10 MG/1
10 TABLET ORAL
COMMUNITY
End: 2018-07-02

## 2018-03-15 RX ORDER — DEXTROSE 50 % IN WATER (D50W) INTRAVENOUS SYRINGE
12.5-25 AS NEEDED
Status: DISCONTINUED | OUTPATIENT
Start: 2018-03-15 | End: 2018-03-20 | Stop reason: HOSPADM

## 2018-03-15 RX ORDER — FUROSEMIDE 20 MG/1
20 TABLET ORAL 2 TIMES DAILY
COMMUNITY
End: 2018-07-02

## 2018-03-15 RX ORDER — INSULIN LISPRO 100 [IU]/ML
INJECTION, SOLUTION INTRAVENOUS; SUBCUTANEOUS
Status: DISCONTINUED | OUTPATIENT
Start: 2018-03-15 | End: 2018-03-16

## 2018-03-15 RX ORDER — ACETAMINOPHEN 500 MG
1000 TABLET ORAL ONCE
Status: COMPLETED | OUTPATIENT
Start: 2018-03-15 | End: 2018-03-15

## 2018-03-15 RX ORDER — LEVOFLOXACIN 5 MG/ML
750 INJECTION, SOLUTION INTRAVENOUS EVERY 24 HOURS
Status: DISCONTINUED | OUTPATIENT
Start: 2018-03-15 | End: 2018-03-17

## 2018-03-15 RX ORDER — SODIUM CHLORIDE 0.9 % (FLUSH) 0.9 %
5-10 SYRINGE (ML) INJECTION AS NEEDED
Status: DISCONTINUED | OUTPATIENT
Start: 2018-03-15 | End: 2018-03-20 | Stop reason: HOSPADM

## 2018-03-15 RX ORDER — IPRATROPIUM BROMIDE AND ALBUTEROL SULFATE 2.5; .5 MG/3ML; MG/3ML
3 SOLUTION RESPIRATORY (INHALATION)
Status: DISCONTINUED | OUTPATIENT
Start: 2018-03-15 | End: 2018-03-17

## 2018-03-15 RX ORDER — MAGNESIUM SULFATE 100 %
4 CRYSTALS MISCELLANEOUS AS NEEDED
Status: DISCONTINUED | OUTPATIENT
Start: 2018-03-15 | End: 2018-03-20 | Stop reason: HOSPADM

## 2018-03-15 RX ORDER — DEXAMETHASONE SODIUM PHOSPHATE 10 MG/ML
10 INJECTION INTRAMUSCULAR; INTRAVENOUS ONCE
Status: COMPLETED | OUTPATIENT
Start: 2018-03-15 | End: 2018-03-15

## 2018-03-15 RX ADMIN — ALBUTEROL SULFATE 1 DOSE: 2.5 SOLUTION RESPIRATORY (INHALATION) at 19:45

## 2018-03-15 RX ADMIN — ACETAMINOPHEN 1000 MG: 500 TABLET, FILM COATED ORAL at 19:25

## 2018-03-15 RX ADMIN — SODIUM CHLORIDE 1000 ML: 900 INJECTION, SOLUTION INTRAVENOUS at 18:14

## 2018-03-15 RX ADMIN — DEXAMETHASONE SODIUM PHOSPHATE 10 MG: 10 INJECTION, SOLUTION INTRAMUSCULAR; INTRAVENOUS at 19:27

## 2018-03-15 RX ADMIN — LEVOFLOXACIN 750 MG: 5 INJECTION, SOLUTION INTRAVENOUS at 22:14

## 2018-03-15 NOTE — IP AVS SNAPSHOT
[address] [address] 
[phone] Patient: Dock Frankel MRN: [id number] MARILUZ:[birth year] You are allergic to the following Allergen Reactions Ivp Dye (Fd And C Blue No.1) Hives Recent Documentation Height Weight Breastfeeding? BMI OB Status Smoking Status 1.676 m 91.5 kg No 32.56 kg/m2 Hysterectomy Current Every Day Smoker Unresulted Labs-Please follow up with your PCP about these lab tests Order Current Status IMMUNOGLOBULIN E, QT In process CULTURE, BLOOD, PAIRED Preliminary result Emergency Contacts  (Rel.) Home Phone Work Phone Mobile Phone Vivek Hammond (Daughter) [phone] -- [phone] Jyoti Beasley (Son) [phone] -- -- About your hospitalization You were admitted on:  March 15, 2018 You last received care in the:  [facility] You were discharged on:  March 19, 2018 Why you were hospitalized Your primary diagnosis was:  Sirs (Systemic Inflammatory Response Syndrome) (Hcc) Your diagnoses also included:  Bilateral Leg Edema, Fever, Asthma Exacerbation Providers Seen During Your Hospitalization Provider Specialty Primary office phone Sabrina Rhoades MD Emergency Medicine [phone] Marshall Banks MD Family Practice [phone] Abram Brown MD Hospitalist [phone] Your Primary Care Physician (PCP) Primary Care Physician Office Phone Office Fax Alexa Amor [phone] Follow-up Information Follow up With Details Comments Contact Info [address] to home visit by nurse-- will call you to arrange time of visit. [address] [facility] [address] 
[phone] APRIA  home nebulizer [address] [facility] [address] [phone] Marichuy Lopes MD Schedule an appointment as soon as possible for a visit in 1 week  [address] [id number] 
[phone] Ronaldo Srivastava MD Schedule an appointment as soon as possible for a visit in 1 week  [address] [address] 
[phone] My Medications TAKE these medications as instructed Instructions Each Dose to Equal  
 Morning Noon Evening Bedtime  
 albuterol-ipratropium 2.5 mg-0.5 mg/3 ml Nebu Commonly known as:  Sushil Hunter Your last dose was: Your next dose is:    
   
   
 3 mL by Nebulization route every six (6) hours as needed. 3 mL  
    
   
   
   
  
 amLODIPine 5 mg tablet Commonly known as:  Domonique Pinedo Your last dose was: Your next dose is: Take 5 mg by mouth daily. 5 mg  
    
   
   
   
  
 amoxicillin-clavulanate 875-125 mg per tablet Commonly known as:  AUGMENTIN Your last dose was: Your next dose is: Take 1 Tab by mouth every twelve (12) hours. 1 Tab  
    
   
   
   
  
 aspirin 325 mg tablet Commonly known as:  ASPIRIN Your last dose was: Your next dose is: Take 325 mg by mouth daily. 325 mg  
    
   
   
   
  
 baclofen 10 mg tablet Commonly known as:  LIORESAL Your last dose was: Your next dose is: Take 10 mg by mouth daily as needed. 10 mg  
    
   
   
   
  
 benazepril 10 mg tablet Commonly known as:  LOTENSIN Your last dose was: Your next dose is: Take 10 mg by mouth daily. 10 mg  
    
   
   
   
  
 fluticasone-salmeterol 250-50 mcg/dose diskus inhaler Commonly known as:  ADVAIR Your last dose was: Your next dose is: Take 1 Puff by inhalation two (2) times a day. 1 Puff  
    
   
   
   
  
 furosemide 20 mg tablet Commonly known as:  LASIX Your last dose was: Your next dose is: Take 20 mg by mouth daily. 20 mg  
    
   
   
   
  
 metoprolol tartrate 50 mg tablet Commonly known as:  LOPRESSOR Your last dose was: Your next dose is: Take 100 mg by mouth daily. 100 mg  
    
   
   
   
  
 montelukast 10 mg tablet Commonly known as:  SINGULAIR Your last dose was: Your next dose is: Take 1 Tab by mouth nightly. 10 mg  
    
   
   
   
  
 nicotine 14 mg/24 hr patch Commonly known as:  Vladimir Hanks Start taking on:  3/20/2018 Your last dose was: Your next dose is:    
   
   
 1 Patch by TransDERmal route daily for 30 days. 1 Patch NovoLIN 70/30 U-100 Insulin 100 unit/mL (70-30) injection Generic drug:  insulin NPH/insulin regular Your last dose was: Your next dose is:    
   
   
 50 Units by SubCUTAneous route two (2) times a day. 50 Units  
    
   
   
   
  
 oseltamivir 75 mg capsule Commonly known as:  TAMIFLU Your last dose was: Your next dose is: Take 1 Cap by mouth every twelve (12) hours for 5 days. Indications: INFLUENZA 75 mg  
    
   
   
   
  
 predniSONE 20 mg tablet Commonly known as:  Tedra Pac Start taking on:  3/20/2018 Your last dose was: Your next dose is: Take 1 Tab by mouth daily (with breakfast). 20 mg  
    
   
   
   
  
 senna-docusate 8.6-50 mg per tablet Commonly known as:  Tiffany Shahida Start taking on:  3/20/2018 Your last dose was: Your next dose is: Take 1 Tab by mouth daily. 1 Tab Where to Get Your Medications Information on where to get these meds will be given to you by the nurse or doctor. ! Ask your nurse or doctor about these medications  
  albuterol-ipratropium 2.5 mg-0.5 mg/3 ml Nebu amoxicillin-clavulanate 875-125 mg per tablet  
 fluticasone-salmeterol 250-50 mcg/dose diskus inhaler  
 montelukast 10 mg tablet  
 nicotine 14 mg/24 hr patch  
 oseltamivir 75 mg capsule  
 predniSONE 20 mg tablet  
 senna-docusate 8.6-50 mg per tablet Discharge Instructions Discharge Instructions PATIENT ID: Deion Wakefield MRN: [id number] YOB: 1955 DATE OF ADMISSION: 3/15/2018  5:19 PM   
DATE OF DISCHARGE: 3/19/2018 PRIMARY CARE PROVIDER: Alice Dia MD  
 
ATTENDING PHYSICIAN: Machelle Kwok MD 
DISCHARGING PROVIDER: Machelle Kwok MD   
To contact this individual call [phone] and ask the  to page. If unavailable ask to be transferred the Adult Hospitalist Department. DISCHARGE DIAGNOSES Flu, Asthma Exacerbation CONSULTATIONS: IP CONSULT TO HOSPITALIST 
IP CONSULT TO PULMONOLOGY PENDING TEST RESULTS:  
At the time of discharge the following test results are still pending: None FOLLOW UP APPOINTMENTS:  
Follow-up Information Follow up With Details Comments Contact "[facility] [address] to home visit by nurse-- will call you to arrange time of visit. [address] [facility] [address] 
[phone] APRIA  home nebulizer [address] [facility] [facility] [address] 
[phone] Alice Dia MD Schedule an appointment as soon as possible for a visit in 1 week  [facility] 
[phone] Nicki Monahan MD Schedule an appointment as soon as possible for a visit in 1 week Pulmonology [address] [place] [address] 
[phone] ADDITIONAL CARE RECOMMENDATIONS: Please finish taking your full course of antibiotics and medicine for the flu. You have 2 more days of steroids. Please also follow-up with your primary care doctor within the next week. We have arranged for a nebulizer machine for you and will give you scripts for an inhaler. More information below. Please see the above doctors as recommended. YOU ALSO NEED A REPEAT CT SCAN IN 3 MONTHS FOR SOME ENLARGED NODES SEEN ON YOUR SCAN. DIET: Cardiac Diet ACTIVITY: Activity as tolerated. Please see information below about how to avoid spreading the flu. DISCHARGE MEDICATIONS: 
 See Medication Reconciliation Form · It is important that you take the medication exactly as they are prescribed. · Keep your medication in the bottles provided by the pharmacist and keep a list of the medication names, dosages, and times to be taken in your wallet. · Do not take other medications without consulting your doctor. NOTIFY YOUR PHYSICIAN FOR ANY OF THE FOLLOWING:  
Fever over 101 degrees for 24 hours. Chest pain, shortness of breath, fever, chills, nausea, vomiting, diarrhea, change in mentation, falling, weakness, bleeding. Severe pain or pain not relieved by medications. Or, any other signs or symptoms that you may have questions about. DISPOSITION: 
  Home With: 
 OT  PT  [facility]  RN  
  
 SNF/Inpatient Rehab/LTAC Independent/assisted living Hospice Other:  
 
 
Signed:  
Rukhsana Knight MD 
3/19/2018 
5:03 PM 
 
 Influenza (Flu): Care Instructions Your Care Instructions Influenza (flu) is an infection in the lungs and breathing passages. It is caused by the influenza virus. There are different strains, or types, of the flu virus from year to year. Unlike the common cold, the flu comes on suddenly and the symptoms, such as a cough, congestion, fever, chills, fatigue, aches, and pains, are more severe. These symptoms may last up to 10 days. Although the flu can make you feel very sick, it usually doesn't cause serious health problems. Home treatment is usually all you need for flu symptoms.  But your doctor may prescribe antiviral medicine to prevent other health problems, such as pneumonia, from developing. Older people and those who have a long-term health condition, such as lung disease, are most at risk for having pneumonia or other health problems. Follow-up care is a key part of your treatment and safety. Be sure to make and go to all appointments, and call your doctor if you are having problems. It's also a good idea to know your test results and keep a list of the medicines you take. How can you care for yourself at home? · Get plenty of rest. 
· Drink plenty of fluids, enough so that your urine is light yellow or clear like water. If you have kidney, heart, or liver disease and have to limit fluids, talk with your doctor before you increase the amount of fluids you drink. · Take an over-the-counter pain medicine if needed, such as acetaminophen (Tylenol), ibuprofen (Advil, Motrin), or naproxen (Aleve), to relieve fever, headache, and muscle aches. Read and follow all instructions on the label. No one younger than 20 should take aspirin. It has been linked to Reye syndrome, a serious illness. · Do not smoke. Smoking can make the flu worse. If you need help quitting, talk to your doctor about stop-smoking programs and medicines. These can increase your chances of quitting for good. · Breathe moist air from a hot shower or from a sink filled with hot water to help clear a stuffy nose. · Before you use cough and cold medicines, check the label. These medicines may not be safe for young children or for people with certain health problems. · If the skin around your nose and lips becomes sore, put some petroleum jelly on the area. · To ease coughing: ¨ Drink fluids to soothe a scratchy throat. ¨ Suck on cough drops or plain hard candy. ¨ Take an over-the-counter cough medicine that contains dextromethorphan to help you get some sleep. Read and follow all instructions on the label. ¨ Raise your head at night with an extra pillow. This may help you rest if coughing keeps you awake. · Take any prescribed medicine exactly as directed. Call your doctor if you think you are having a problem with your medicine. To avoid spreading the flu · Wash your hands regularly, and keep your hands away from your face. · Stay home from school, work, and other public places until you are feeling better and your fever has been gone for at least 24 hours. The fever needs to have gone away on its own without the help of medicine. · Ask people living with you to talk to their doctors about preventing the flu. They may get antiviral medicine to keep from getting the flu from you. · To prevent the flu in the future, get a flu vaccine every fall. Encourage people living with you to get the vaccine. · Cover your mouth when you cough or sneeze. When should you call for help? Call 911 anytime you think you may need emergency care. For example, call if: 
? · You have severe trouble breathing. ?Call your doctor now or seek immediate medical care if: 
? · You have new or worse trouble breathing. ? · You seem to be getting much sicker. ? · You feel very sleepy or confused. ? · You have a new or higher fever. ? · You get a new rash. ? Watch closely for changes in your health, and be sure to contact your doctor if: 
? · You begin to get better and then get worse. ? · You are not getting better after 1 week. Where can you learn more? Go to http://percy-jeremie.info/. Enter G567 in the search box to learn more about \"Influenza (Flu): Care Instructions. \" Current as of: May 12, 2017 Content Version: 11.4 © 3681-1002 [employer]. Care instructions adapted under license by [employer] (which disclaims liability or warranty for this information).  If you have questions about a medical condition or this instruction, always ask your healthcare professional. [employer] disclaims any warranty or liability for your use of this information. 
  
 
COPD and Asthma: Care Instructions Your Care Instructions Some people who have chronic obstructive pulmonary disease (COPD) also have asthma. Both of these problems can damage your lungs. This makes it very important to control them. Asthma causes the airways that lead to the lungs to swell and become narrow. This makes it hard to breathe. You may wheeze or cough. If you have a bad attack, you may need emergency care. There are two parts to treating asthma. · Controlling asthma over the long term. · Treating attacks when they occur. You and your doctor can make an asthma treatment plan that will help. This plan tells you the medicines you take every day to reduce the swelling in your airways and prevent attacks. It also tells you what to do if you have an asthma attack. Follow-up care is a key part of your treatment and safety. Be sure to make and go to all appointments, and call your doctor if you are having problems. It's also a good idea to know your test results and keep a list of the medicines you take. How can you care for yourself at home? To control asthma over the long term Medicines Controller medicines reduce swelling in your lungs. They also prevent asthma attacks. Take your controller medicine exactly as prescribed. Talk to your doctor if you have any problems with your medicine. · Inhaled corticosteroid is a common and effective controller medicine. Using it the right way can prevent or reduce most side effects. · Take your controller medicine every day, not just when you have symptoms. This helps prevent problems before they occur. · Always bring your asthma medicine with you when you travel. · Your doctor may prescribe long-acting medicine that combines a corticosteroid with a beta2-agonist. Follow your doctor's instructions exactly about how to take a long-acting medicine. Examples include: ¨ Fluticasone and salmeterol (Advair). ¨ Budesonide and formoterol (Symbicort). · Do not depend on your controller medicines to stop an asthma attack that has already started. They do not work fast enough to help. · Your doctor may also prescribe anticholinergic inhalers. These include ipratropium (Atrovent) and tiotropium (Spiriva). Education · Learn what sets off an asthma attack. Avoid these triggers when you can. Common triggers include smoke, air pollution, pollen, animal dander, colds, stress, and cold air. · Do not smoke. Smoking can make COPD and asthma worse. If you need help quitting, talk to your doctor about stop-smoking programs and medicines. These can increase your chances of quitting for good. · Check yourself for symptoms to know which step to follow in your action plan. Watch for things like being short of breath, having chest tightness, coughing, and wheezing. Also notice if symptoms wake you up at night or if you get tired quickly when you exercise. · You may want to learn how to use a peak flow meter. This measures how open your airways are. It may help you know when you will have an asthma attack. To treat attacks when they occur Use your asthma action plan when you have an attack. Your quick-relief medicine, such as albuterol, will stop an asthma attack. It relaxes the muscles that get tight around the airways. · Take your quick-relief medicine exactly as prescribed. Talk with your doctor if you have any problems with your medicine. · Keep this medicine with you at all times. · You may need to use this medicine before you exercise. If your doctor prescribed corticosteroid pills to use during an attack, take them as directed. They may take hours to work, but they may shorten the attack and help you breathe better. When should you call for help? Call 911 anytime you think you may need emergency care. For example, call if: 
? · You have severe trouble breathing. ?Call your doctor now or seek immediate medical care if: 
? · You have new or worse shortness of breath. ? · You are coughing more deeply or more often, especially if you notice more mucus or a change in the color of your mucus. ? · You cough up blood. ? · You have new or increased swelling in your legs or belly. ? · You have a fever. ? · You have used your quick-relief medicine but you are still short of breath. ? Watch closely for changes in your health, and be sure to contact your doctor if you have any problems. Where can you learn more? Go to http://percy-jeremie.info/. Enter A350 in the search box to learn more about \"COPD and Asthma: Care Instructions. \" Current as of: May 12, 2017 Content Version: 11.4 © 2397-2999 [employer]. Care instructions adapted under license by [employer] (which disclaims liability or warranty for this information). If you have questions about a medical condition or this instruction, always ask your healthcare professional. [address] [address] any warranty or liability for your use of this information. Discharge Orders None Trustlook Announcement We are excited to announce that we are making your provider's discharge notes available to you in Trustlook. You will see these notes when they are completed and signed by the physician that discharged you from your recent hospital stay. If you have any questions or concerns about any information you see in Trustlook, please call the Health Information Department where you were seen or reach out to your Primary Care Provider for more information about your plan of care. Introducing [facility]! [address] introduces Trustlook patient portal. Now you can access parts of your medical record, email your doctor's office, and request medication refills online. 1. In your internet browser, go to [email or web address] 2. Click on the First Time User? Click Here link in the Sign In box. You will see the New Member Sign Up page. 3. Enter your [employer] Access Code exactly as it appears below. You will not need to use this code after youve completed the sign-up process. If you do not sign up before the expiration date, you must request a new code. · [employer] Access Code: [email or web address] Expires: 4/25/2018  4:51 PM 
 
4. Enter the last four digits of your Social Security Number (xxxx) and Date of Birth (mm/dd/yyyy) as indicated and click Submit. You will be taken to the next sign-up page. 5. Create a [employer] ID. This will be your [employer] login ID and cannot be changed, so think of one that is secure and easy to remember. 6. Create a [employer] password. You can change your password at any time. 7. Enter your Password Reset Question and Answer. This can be used at a later time if you forget your password. 8. Enter your e-mail address. You will receive e-mail notification when new information is available in [address] 9. Click Sign Up. You can now view and download portions of your medical record. 10. Click the Download Summary menu link to download a portable copy of your medical information. If you have questions, please visit the Frequently Asked Questions section of the [employer] website. Remember, [employer] is NOT to be used for urgent needs. For medical emergencies, dial 911. Now available from your iPhone and Android! General Information Please provide this summary of care documentation to your next provider. Patient Signature:  ____________________________________________________________ Date:  ____________________________________________________________  
  
Lawerence Emiliano Provider Signature:  ____________________________________________________________ Date:  ____________________________________________________________

## 2018-03-15 NOTE — ED TRIAGE NOTES
Pt complains of shortness of breath for 2 days and swelling to bilat. legs and feet. Also complains of intermitt productive cough of clear flem and has chest pain when she coughs.

## 2018-03-15 NOTE — ED PROVIDER NOTES
HPI Comments: 60 y.o. female with past medical history significant for DM, HTN, asthma, CAD, and coronary stent placement who presents from home with chief complaint of SOB. The pt c/o SOB, cough, LE edema( for the last few weeks), and HA(last few days). She also reports mild diarrhea today. The pt reports that she was started on a anew diuretic 2 weeks ago and she has not improved. The pt denies prior BiPAP use, vomiting, and fever. There are no other acute medical concerns at this time. Social hx: current smoker, no EtOH use  PCP: Monica Ferrer MD    Note written by Pradip Kohli, as dictated by Chasidy Hargrove MD 5:43 PM      The history is provided by the patient. No  was used. Past Medical History:   Diagnosis Date    Asthma     CAD (coronary artery disease)     Diabetes ([facility] [address]     Hypertension        Past Surgical History:   Procedure Laterality Date    CARDIAC SURG PROCEDURE UNLIST      DELIVERY       HX CORONARY STENT PLACEMENT      HX HYSTERECTOMY      VASCULAR SURGERY PROCEDURE UNLIST      leg stent         History reviewed. No pertinent family history. Social History     Social History    Marital status: SINGLE     Spouse name: N/A    Number of children: N/A    Years of education: N/A     Occupational History    Not on file. Social History Main Topics    Smoking status: Current Every Day Smoker     Packs/day: 0.25    Smokeless tobacco: Not on file    Alcohol use No    Drug use: Not on file    Sexual activity: Not on file     Other Topics Concern    Not on file     Social History Narrative         ALLERGIES: Ivp dye [fd and c blue no. 1]    Review of Systems   Constitutional: Negative for chills and fever. Respiratory: Positive for cough and shortness of breath. Cardiovascular: Positive for chest pain. Gastrointestinal: Positive for diarrhea. Negative for vomiting.    All other systems reviewed and are negative. Vitals:    03/15/18 1601 03/15/18 1737 03/15/18 1800 03/15/18 1900   BP: 132/67 134/79 150/85 153/69   Pulse: (!) 101 (!) 104 (!) 104 (!) 112   Resp: 24 24 23 27   Temp: 99.1 °F (37.3 °C) 100.3 °F (37.9 °C)     SpO2:  97% 95% 95%   Weight: 89.6 kg (197 lb 8 oz)      Height: 5' 6\" (1.676 m)               Physical Exam   Constitutional: She is oriented to person, place, and time. She appears well-developed and well-nourished. Uncomfortable appearing, AxOx4, speaking in complete sentences, noted audible wheezing at end expiratory;      HENT:   Cn intact     Eyes: Conjunctivae are normal. Pupils are equal, round, and reactive to light. Right eye exhibits no discharge. No scleral icterus. Neck: Normal range of motion. Neck supple. No JVD present. No tracheal deviation present. Cardiovascular: Normal rate, regular rhythm, normal heart sounds and intact distal pulses. Exam reveals no gallop and no friction rub. No murmur heard. Pulmonary/Chest: Effort normal. No respiratory distress. She has wheezes. She has no rales. She exhibits no tenderness. Globally decreased air movement; Abdominal: Soft. Bowel sounds are normal. There is no tenderness. There is no rebound and no guarding. nttp     Genitourinary: No vaginal discharge found. Musculoskeletal: Normal range of motion. She exhibits no edema, tenderness or deformity. Neurological: She is alert and oriented to person, place, and time. She has normal reflexes. No cranial nerve deficit. She exhibits normal muscle tone. Coordination normal.   pt has motor/ CV/ Sensation grossly intact to all extremities, R = L in strength;   Skin: Skin is warm and dry. No rash noted. No erythema. No pallor. Psychiatric: She has a normal mood and affect. Her behavior is normal. Thought content normal.   Nursing note and vitals reviewed.        [facility]      ED Course       Procedures    Chief Complaint   Patient presents with    Shortness of Breath    Cough  Chest Pain       7:37 PM  The patients presenting problems have been discussed, and they are in agreement with the care plan formulated and outlined with them. I have encouraged them to ask questions as they arise throughout their visit. MEDICATIONS GIVEN:  Medications   sodium chloride 0.9 % bolus infusion 1,000 mL (1,000 mL IntraVENous New Bag 3/15/18 1814)   albuterol 5mg / ipratropium 0.5mg neb solution (not administered)   acetaminophen (TYLENOL) tablet 1,000 mg (1,000 mg Oral Given 3/15/18 1925)   dexamethasone (PF) (DECADRON) injection 10 mg (10 mg IntraVENous Given 3/15/18 1927)       LABS REVIEWED:  Labs Reviewed   CBC WITH AUTOMATED DIFF - Abnormal; Notable for the following:        Result Value    RBC 3.58 (*)     HCT 34.6 (*)     All other components within normal limits   METABOLIC PANEL, BASIC - Abnormal; Notable for the following:     Glucose 227 (*)     Creatinine 1.08 (*)     GFR est non-AA 51 (*)     Calcium 8.2 (*)     All other components within normal limits   URINALYSIS W/MICROSCOPIC - Abnormal; Notable for the following:     Protein >300 (*)     Glucose 250 (*)     Blood MODERATE (*)     Urobilinogen 2.0 (*)     Bacteria 1+ (*)     Budding yeast PRESENT (*)     All other components within normal limits   NT-PRO BNP - Abnormal; Notable for the following:     NT pro- (*)     All other components within normal limits   URINE CULTURE HOLD SAMPLE   CULTURE, BLOOD, PAIRED   TROPONIN I   LACTIC ACID   SAMPLES BEING HELD       RADIOLOGY RESULTS:  The following have been ordered and reviewed:  _____________________________________________________________________  _____________________________________________________________________    EKG interpretation:   Rhythm:  Sinus tachycardia  rhythm; and regular .  Rate (approx.): 113; Axis: normal; P wave: normal; QRS interval: normal ; ST/T wave: normal; Negative acute significant segmental elevations    PROCEDURES:        CONSULTATIONS: PROGRESS NOTES:      DIAGNOSIS:    1. Acute exacerbation of chronic obstructive pulmonary disease (COPD) ([facility])    2. Smoking        PLAN:  1- acute COPD exacerbation/ wheezing/       ED COURSE: The patients hospital course has been uncomplicated. 7:37 PM  Patient is being evaluated for admission to the hospital by the hospitalist, Dr Jimbo Hernandez . The results of their tests and reasons for their admission have been discussed with them and/or available family. They convey agreement and understanding for the need to be admitted and for their admission diagnosis. Consultation has been made with the inpatient physician specialist for hospitalization.

## 2018-03-15 NOTE — ED NOTES
Md at bedside. Pt reports beginning new medication for her heart 2 weeks ago. Reports SOB at rest, HA, and bilateral leg pain and swelling, and fever. Denies n/v. Reports using albuterol inhaler PTA twice at home with some relief.

## 2018-03-16 LAB
ANION GAP SERPL CALC-SCNC: 9 MMOL/L (ref 5–15)
BASOPHILS # BLD: 0 K/UL (ref 0–0.1)
BASOPHILS NFR BLD: 0 % (ref 0–1)
BUN SERPL-MCNC: 18 MG/DL (ref 6–20)
BUN/CREAT SERPL: 14 (ref 12–20)
CALCIUM SERPL-MCNC: 8.1 MG/DL (ref 8.5–10.1)
CHLORIDE SERPL-SCNC: 103 MMOL/L (ref 97–108)
CHOLEST SERPL-MCNC: 265 MG/DL
CO2 SERPL-SCNC: 24 MMOL/L (ref 21–32)
CREAT SERPL-MCNC: 1.29 MG/DL (ref 0.55–1.02)
DIFFERENTIAL METHOD BLD: ABNORMAL
EOSINOPHIL # BLD: 0 K/UL (ref 0–0.4)
EOSINOPHIL NFR BLD: 0 % (ref 0–7)
ERYTHROCYTE [DISTWIDTH] IN BLOOD BY AUTOMATED COUNT: 13.8 % (ref 11.5–14.5)
EST. AVERAGE GLUCOSE BLD GHB EST-MCNC: 197 MG/DL
GLUCOSE BLD STRIP.AUTO-MCNC: 292 MG/DL (ref 65–100)
GLUCOSE BLD STRIP.AUTO-MCNC: 358 MG/DL (ref 65–100)
GLUCOSE BLD STRIP.AUTO-MCNC: 402 MG/DL (ref 65–100)
GLUCOSE BLD STRIP.AUTO-MCNC: 409 MG/DL (ref 65–100)
GLUCOSE BLD STRIP.AUTO-MCNC: 417 MG/DL (ref 65–100)
GLUCOSE SERPL-MCNC: 409 MG/DL (ref 65–100)
HBA1C MFR BLD: 8.5 % (ref 4.2–6.3)
HCT VFR BLD AUTO: 32.8 % (ref 35–47)
HDLC SERPL-MCNC: 37 MG/DL
HDLC SERPL: 7.2 {RATIO} (ref 0–5)
HGB BLD-MCNC: 11.1 G/DL (ref 11.5–16)
IMM GRANULOCYTES # BLD: 0.1 K/UL (ref 0–0.04)
IMM GRANULOCYTES NFR BLD AUTO: 1 % (ref 0–0.5)
LDLC SERPL CALC-MCNC: 209.6 MG/DL (ref 0–100)
LIPID PROFILE,FLP: ABNORMAL
LYMPHOCYTES # BLD: 0.7 K/UL (ref 0.8–3.5)
LYMPHOCYTES NFR BLD: 10 % (ref 12–49)
MCH RBC QN AUTO: 32.3 PG (ref 26–34)
MCHC RBC AUTO-ENTMCNC: 33.8 G/DL (ref 30–36.5)
MCV RBC AUTO: 95.3 FL (ref 80–99)
MONOCYTES # BLD: 0.1 K/UL (ref 0–1)
MONOCYTES NFR BLD: 2 % (ref 5–13)
NEUTS SEG # BLD: 5.6 K/UL (ref 1.8–8)
NEUTS SEG NFR BLD: 87 % (ref 32–75)
NRBC # BLD: 0 K/UL (ref 0–0.01)
NRBC BLD-RTO: 0 PER 100 WBC
PLATELET # BLD AUTO: 269 K/UL (ref 150–400)
PMV BLD AUTO: 9.4 FL (ref 8.9–12.9)
POTASSIUM SERPL-SCNC: 4.3 MMOL/L (ref 3.5–5.1)
RBC # BLD AUTO: 3.44 M/UL (ref 3.8–5.2)
RBC MORPH BLD: ABNORMAL
SERVICE CMNT-IMP: ABNORMAL
SODIUM SERPL-SCNC: 136 MMOL/L (ref 136–145)
TRIGL SERPL-MCNC: 92 MG/DL (ref ?–150)
VLDLC SERPL CALC-MCNC: 18.4 MG/DL
WBC # BLD AUTO: 6.5 K/UL (ref 3.6–11)

## 2018-03-16 PROCEDURE — 82962 GLUCOSE BLOOD TEST: CPT

## 2018-03-16 PROCEDURE — 65270000029 HC RM PRIVATE

## 2018-03-16 PROCEDURE — 74011250637 HC RX REV CODE- 250/637: Performed by: FAMILY MEDICINE

## 2018-03-16 PROCEDURE — 94640 AIRWAY INHALATION TREATMENT: CPT

## 2018-03-16 PROCEDURE — 74011250637 HC RX REV CODE- 250/637: Performed by: NURSE PRACTITIONER

## 2018-03-16 PROCEDURE — 74011000258 HC RX REV CODE- 258: Performed by: FAMILY MEDICINE

## 2018-03-16 PROCEDURE — 77030029684 HC NEB SM VOL KT MONA -A

## 2018-03-16 PROCEDURE — 83036 HEMOGLOBIN GLYCOSYLATED A1C: CPT | Performed by: FAMILY MEDICINE

## 2018-03-16 PROCEDURE — 85025 COMPLETE CBC W/AUTO DIFF WBC: CPT | Performed by: FAMILY MEDICINE

## 2018-03-16 PROCEDURE — 93306 TTE W/DOPPLER COMPLETE: CPT | Performed by: FAMILY MEDICINE

## 2018-03-16 PROCEDURE — 80061 LIPID PANEL: CPT | Performed by: FAMILY MEDICINE

## 2018-03-16 PROCEDURE — 74011636637 HC RX REV CODE- 636/637: Performed by: NURSE PRACTITIONER

## 2018-03-16 PROCEDURE — 74011000250 HC RX REV CODE- 250: Performed by: FAMILY MEDICINE

## 2018-03-16 PROCEDURE — 93306 TTE W/DOPPLER COMPLETE: CPT

## 2018-03-16 PROCEDURE — 74011250636 HC RX REV CODE- 250/636: Performed by: FAMILY MEDICINE

## 2018-03-16 PROCEDURE — 36415 COLL VENOUS BLD VENIPUNCTURE: CPT | Performed by: FAMILY MEDICINE

## 2018-03-16 PROCEDURE — 80048 BASIC METABOLIC PNL TOTAL CA: CPT | Performed by: FAMILY MEDICINE

## 2018-03-16 RX ORDER — INSULIN GLARGINE 100 [IU]/ML
30 INJECTION, SOLUTION SUBCUTANEOUS DAILY
Status: DISCONTINUED | OUTPATIENT
Start: 2018-03-16 | End: 2018-03-16

## 2018-03-16 RX ORDER — HEPARIN SODIUM 5000 [USP'U]/ML
5000 INJECTION, SOLUTION INTRAVENOUS; SUBCUTANEOUS EVERY 8 HOURS
Status: DISCONTINUED | OUTPATIENT
Start: 2018-03-16 | End: 2018-03-20 | Stop reason: HOSPADM

## 2018-03-16 RX ORDER — INSULIN LISPRO 100 [IU]/ML
15 INJECTION, SOLUTION INTRAVENOUS; SUBCUTANEOUS ONCE
Status: COMPLETED | OUTPATIENT
Start: 2018-03-16 | End: 2018-03-16

## 2018-03-16 RX ORDER — DEXTROSE 50 % IN WATER (D50W) INTRAVENOUS SYRINGE
12.5-25 AS NEEDED
Status: DISCONTINUED | OUTPATIENT
Start: 2018-03-16 | End: 2018-03-16 | Stop reason: SDUPTHER

## 2018-03-16 RX ORDER — INSULIN LISPRO 100 [IU]/ML
INJECTION, SOLUTION INTRAVENOUS; SUBCUTANEOUS
Status: DISCONTINUED | OUTPATIENT
Start: 2018-03-16 | End: 2018-03-20 | Stop reason: HOSPADM

## 2018-03-16 RX ORDER — SODIUM CHLORIDE 0.9 % (FLUSH) 0.9 %
5-10 SYRINGE (ML) INJECTION EVERY 8 HOURS
Status: DISCONTINUED | OUTPATIENT
Start: 2018-03-16 | End: 2018-03-20 | Stop reason: HOSPADM

## 2018-03-16 RX ORDER — INSULIN GLARGINE 100 [IU]/ML
30 INJECTION, SOLUTION SUBCUTANEOUS 2 TIMES DAILY
Status: DISCONTINUED | OUTPATIENT
Start: 2018-03-16 | End: 2018-03-17

## 2018-03-16 RX ORDER — SODIUM CHLORIDE 0.9 % (FLUSH) 0.9 %
5-10 SYRINGE (ML) INJECTION AS NEEDED
Status: DISCONTINUED | OUTPATIENT
Start: 2018-03-16 | End: 2018-03-20 | Stop reason: HOSPADM

## 2018-03-16 RX ORDER — MONTELUKAST SODIUM 10 MG/1
10 TABLET ORAL
Status: DISCONTINUED | OUTPATIENT
Start: 2018-03-16 | End: 2018-03-20 | Stop reason: HOSPADM

## 2018-03-16 RX ORDER — INSULIN LISPRO 100 [IU]/ML
12 INJECTION, SOLUTION INTRAVENOUS; SUBCUTANEOUS ONCE
Status: COMPLETED | OUTPATIENT
Start: 2018-03-16 | End: 2018-03-16

## 2018-03-16 RX ORDER — MAGNESIUM SULFATE 100 %
4 CRYSTALS MISCELLANEOUS AS NEEDED
Status: DISCONTINUED | OUTPATIENT
Start: 2018-03-16 | End: 2018-03-16 | Stop reason: SDUPTHER

## 2018-03-16 RX ORDER — AMLODIPINE BESYLATE 5 MG/1
5 TABLET ORAL DAILY
Status: DISCONTINUED | OUTPATIENT
Start: 2018-03-16 | End: 2018-03-20 | Stop reason: HOSPADM

## 2018-03-16 RX ORDER — ASPIRIN 325 MG
325 TABLET ORAL DAILY
Status: DISCONTINUED | OUTPATIENT
Start: 2018-03-16 | End: 2018-03-20 | Stop reason: HOSPADM

## 2018-03-16 RX ORDER — PREDNISONE 20 MG/1
20 TABLET ORAL
Status: DISCONTINUED | OUTPATIENT
Start: 2018-03-16 | End: 2018-03-20 | Stop reason: HOSPADM

## 2018-03-16 RX ORDER — LISINOPRIL 10 MG/1
10 TABLET ORAL DAILY
Status: DISCONTINUED | OUTPATIENT
Start: 2018-03-16 | End: 2018-03-20 | Stop reason: HOSPADM

## 2018-03-16 RX ORDER — FUROSEMIDE 20 MG/1
20 TABLET ORAL DAILY
Status: DISCONTINUED | OUTPATIENT
Start: 2018-03-16 | End: 2018-03-20 | Stop reason: HOSPADM

## 2018-03-16 RX ORDER — IBUPROFEN 200 MG
1 TABLET ORAL DAILY
Status: DISCONTINUED | OUTPATIENT
Start: 2018-03-16 | End: 2018-03-20 | Stop reason: HOSPADM

## 2018-03-16 RX ORDER — INSULIN GLARGINE 100 [IU]/ML
35 INJECTION, SOLUTION SUBCUTANEOUS 2 TIMES DAILY
Status: DISCONTINUED | OUTPATIENT
Start: 2018-03-16 | End: 2018-03-16

## 2018-03-16 RX ORDER — METOPROLOL TARTRATE 50 MG/1
100 TABLET ORAL DAILY
Status: DISCONTINUED | OUTPATIENT
Start: 2018-03-16 | End: 2018-03-20 | Stop reason: HOSPADM

## 2018-03-16 RX ORDER — ACETAMINOPHEN 325 MG/1
650 TABLET ORAL
Status: DISCONTINUED | OUTPATIENT
Start: 2018-03-16 | End: 2018-03-20 | Stop reason: HOSPADM

## 2018-03-16 RX ADMIN — INSULIN LISPRO 12 UNITS: 100 INJECTION, SOLUTION INTRAVENOUS; SUBCUTANEOUS at 07:58

## 2018-03-16 RX ADMIN — INSULIN GLARGINE 30 UNITS: 100 INJECTION, SOLUTION SUBCUTANEOUS at 10:38

## 2018-03-16 RX ADMIN — IPRATROPIUM BROMIDE AND ALBUTEROL SULFATE 3 ML: .5; 3 SOLUTION RESPIRATORY (INHALATION) at 10:40

## 2018-03-16 RX ADMIN — PIPERACILLIN SODIUM,TAZOBACTAM SODIUM 3.38 G: 3; .375 INJECTION, POWDER, FOR SOLUTION INTRAVENOUS at 00:15

## 2018-03-16 RX ADMIN — HEPARIN SODIUM 5000 UNITS: 5000 INJECTION, SOLUTION INTRAVENOUS; SUBCUTANEOUS at 07:16

## 2018-03-16 RX ADMIN — INSULIN GLARGINE 30 UNITS: 100 INJECTION, SOLUTION SUBCUTANEOUS at 18:31

## 2018-03-16 RX ADMIN — LISINOPRIL 10 MG: 10 TABLET ORAL at 08:49

## 2018-03-16 RX ADMIN — HEPARIN SODIUM 5000 UNITS: 5000 INJECTION, SOLUTION INTRAVENOUS; SUBCUTANEOUS at 14:34

## 2018-03-16 RX ADMIN — PREDNISONE 20 MG: 20 TABLET ORAL at 11:20

## 2018-03-16 RX ADMIN — ACETAMINOPHEN 650 MG: 325 TABLET, FILM COATED ORAL at 23:46

## 2018-03-16 RX ADMIN — INSULIN LISPRO 7 UNITS: 100 INJECTION, SOLUTION INTRAVENOUS; SUBCUTANEOUS at 23:48

## 2018-03-16 RX ADMIN — PIPERACILLIN SODIUM,TAZOBACTAM SODIUM 3.38 G: 3; .375 INJECTION, POWDER, FOR SOLUTION INTRAVENOUS at 09:13

## 2018-03-16 RX ADMIN — METOPROLOL TARTRATE 100 MG: 50 TABLET ORAL at 08:50

## 2018-03-16 RX ADMIN — MONTELUKAST SODIUM 10 MG: 10 TABLET, FILM COATED ORAL at 23:46

## 2018-03-16 RX ADMIN — AMLODIPINE BESYLATE 5 MG: 5 TABLET ORAL at 08:48

## 2018-03-16 RX ADMIN — INSULIN LISPRO 15 UNITS: 100 INJECTION, SOLUTION INTRAVENOUS; SUBCUTANEOUS at 11:46

## 2018-03-16 RX ADMIN — FUROSEMIDE 20 MG: 40 TABLET ORAL at 08:48

## 2018-03-16 RX ADMIN — ACETAMINOPHEN 650 MG: 325 TABLET, FILM COATED ORAL at 01:54

## 2018-03-16 RX ADMIN — ASPIRIN 325 MG: 325 TABLET ORAL at 08:47

## 2018-03-16 RX ADMIN — IPRATROPIUM BROMIDE AND ALBUTEROL SULFATE 3 ML: .5; 3 SOLUTION RESPIRATORY (INHALATION) at 23:21

## 2018-03-16 RX ADMIN — LEVOFLOXACIN 750 MG: 5 INJECTION, SOLUTION INTRAVENOUS at 23:49

## 2018-03-16 RX ADMIN — HEPARIN SODIUM 5000 UNITS: 5000 INJECTION, SOLUTION INTRAVENOUS; SUBCUTANEOUS at 23:48

## 2018-03-16 NOTE — ED NOTES
Pt receiving neb tx now, ECG completed and reviewed by MD Meryle Concha. Pt remains on cardiac monitor, VSS.

## 2018-03-16 NOTE — ED NOTES
Pt assisted to bedside commode. Placed in an inpatient hospital bed at this time. Remains alert and oriented times 4. Respirations even and unlabored with symmetrical chest rise.

## 2018-03-16 NOTE — ED NOTES
Pt resting with eyes closed. Respirations even and unlabored with symmetrical chest rise. Repeat lab specimens collected now. Remains on cardiac monitor, VSS. Pt appears to be in NAD.

## 2018-03-16 NOTE — PROGRESS NOTES
Assumed care of pt this am. Pt is alert and oriented with no known needs at this time. Pt is resting in bed. Pt blood sugar is 402, NP notified and new orders entered.

## 2018-03-16 NOTE — PROCEDURES
[facility]  *** FINAL REPORT ***    Name: Juaquin Walker  MRN: [id number]  : 06 Aug 1955  HIS Order #: [id number]  [id number] [facility] Visit #: [id number]  Date: 15 Mar 2018    TYPE OF TEST: Peripheral Venous Testing    REASON FOR TEST  Limb swelling    Right Leg:-  Deep venous thrombosis:           No  Superficial venous thrombosis:    No  Deep venous insufficiency:        Not examined  Superficial venous insufficiency: Not examined    Left Leg:-  Deep venous thrombosis:           No  Superficial venous thrombosis:    No  Deep venous insufficiency:        Not examined  Superficial venous insufficiency: Not examined      INTERPRETATION/FINDINGS  PROCEDURE:  Color duplex ultrasound imaging of lower extremity veins. FINDINGS:       Right: The common femoral, deep femoral, femoral, popliteal,  posterior tibial, peroneal, and great saphenous are patent and without   evidence of thrombus;  each is fully compressible and there is no  narrowing of the flow channel on color Doppler imaging. Phasic flow  is observed in the common femoral vein. Left:   The common femoral, deep femoral, femoral, popliteal,  posterior tibial, peroneal, and great saphenous are patent and without   evidence of thrombus;  each is fully compressible and there is no  narrowing of the flow channel on color Doppler imaging. Phasic flow  is observed in the common femoral vein. IMPRESSION:  No evidence of right or left lower extremity vein  thrombosis. ADDITIONAL COMMENTS    I have personally reviewed the data relevant to the interpretation of  this  study.     TECHNOLOGIST: Yariel Yin RVT  Signed: 03/15/2018 10:16 PM    PHYSICIAN: Chandu Gutierrez MD  Signed: 2018 06:37 AM

## 2018-03-16 NOTE — PROGRESS NOTES
TRANSFER - IN REPORT:    Verbal report received from [facility] (name) on [employer]  being received from ED(unit) for routine progression of care      Report consisted of patients Situation, Background, Assessment and   Recommendations(SBAR). Information from the following report(s) SBAR, Kardex, ED Summary, Intake/Output and MAR was reviewed with the receiving nurse. Opportunity for questions and clarification was provided. Assessment completed upon patients arrival to unit and care assumed.

## 2018-03-16 NOTE — PROGRESS NOTES
Hospitalist Progress Note  Kunal Phillips NP  Answering service: [phone] OR [phone] from in house phone  Cell: [phone]      Date of Service:  3/16/2018  NAME:  Adrienne Barlow  :  1955  MRN:  [id number]      Admission Summary:   60year old Aa female with PMh of type 2 DM, Asthma, Hypertension, CAD, PAD s/p bilateral stent placement at [facility], and Tobacco Abuse who presented with shortness of breath and mild leg edema. She is likely having an acute asthma exacerbation and likely has COPD but will need formal PFTs for diagnosis    Interval history / Subjective:     I rounded on the patient with Светлана Ching RN and Dr. Cristopher White with Pulmonology  She is feeling much better and will likely be ready for discharge in 1-2 days     Assessment & Plan:     SIRS(POA)  Fever and tachycardia  Resolving  Likely due to Bronchitis    Acute Asthma Exacerbation with Bronchitis and Likely COPD (needs formal PFTs)  Added Prednisone and Singulair  Duonebs  Levaquin and Zosyn started, will deescalate to Levaquin  Smoking cessation counseling      Abnormal CT imaging  Chest CT:1. Scattered groundglass opacities in the lungs more likely related to  underexpansion/atelectasis rather than airspace disease.   2. Several small pulmonary nodules that appear unchanged and likely incidental.  3. Increased mildly enlarged subcarinal lymph node which may be reactive  Discussed with Dr. Cristopher White with Pulm, will need repeat imaging in 3 months (pt and daughter aware    Type 2 DM  Hgb A1C 8.5  Restart Insulin  Lantus at 60% of mixed insulin dosing  SSI    Diarrhea  None here    Bilateral Leg Edema and PAD  Recent bilateral stents  Blt Venous Duplex negative    Hypoxia  ABG confirms  Resolved    CAD  ASA, Statin?, BB  Denies chest pain  Echo pending    Hypertension  Home meds    Hyperlipidemia  Would benefit from Statin    Tobacco Abuse  Nicoderm patch    Obesity  counseled      Code status: full  DVT prophylaxis: Heparin    Care Plan discussed with: Patient/Family, Nurse and Consultant Dr. Thuy Butts  Disposition: Home w/Family and TBD     Hospital Problems  Date Reviewed: 3/15/2018          Codes Class Noted POA    Bilateral leg edema ICD-10-CM: R60.0  ICD-9-CM: 782.3  3/15/2018 Unknown        Fever ICD-10-CM: R50.9  ICD-9-CM: 780.60  3/15/2018 Unknown        * (Principal)SIRS (systemic inflammatory response syndrome) ([facility] [address] ICD-10-CM: R65.10  ICD-9-CM: 995.90  3/15/2018 Unknown        Asthma exacerbation ICD-10-CM: J45.901  ICD-9-CM: 493.92  3/15/2018 Unknown                Review of Systems:   A comprehensive review of systems was negative except for that written in the HPI. Vital Signs:    Last 24hrs VS reviewed since prior progress note. Most recent are:  Visit Vitals    /73    Pulse (!) 103    Temp 98.6 °F (37 °C)    Resp 20    Ht 5' 6\" (1.676 m)    Wt 89.6 kg (197 lb 8 oz)    SpO2 99%    BMI 31.88 kg/m2       No intake or output data in the 24 hours ending 03/16/18 1055     Physical Examination:             Constitutional:  No acute distress, cooperative, pleasant    ENT:  Oral mucous moist, oropharynx benign. Neck supple,    Resp:  CTA bilaterally. No wheezing/rhonchi/rales. No accessory muscle use   CV:  Regular rhythm, normal rate, no murmurs, gallops, rubs    GI:  Obese,Soft, non distended, non tender. normoactive bowel sounds, no hepatosplenomegaly     Musculoskeletal:  mild leg edema, warm, nonpalpable pulses, warm to touch    Neurologic:  Moves all extremities. AAOx3, gait not tested     Psych:  Good insight, Not anxious nor agitated. Skin:  Good turgor, no rashes or ulcers  Eyes:  EOMI. Anicteric sclerae, PERRL.        Data Review:    Review and/or order of clinical lab test  Review and/or order of tests in the radiology section of CPT      Labs:     Recent Labs      03/16/18   0425  03/15/18   1607   WBC  6.5  8.0   HGB  11.1*  11.5   HCT  32.8*  34.6*   PLT 269  264     Recent Labs      03/16/18   0425  03/15/18   1607   NA  136  138   K  4.3  4.0   CL  103  103   CO2  24  28   BUN  18  16   CREA  1.29*  1.08*   GLU  409*  227*   CA  8.1*  8.2*     Recent Labs      03/15/18   1607   SGOT  28   ALT  20   AP  137*   TBILI  0.3   TP  7.2   ALB  2.6*   GLOB  4.6*     No results for input(s): INR, PTP, APTT in the last 72 hours. No lab exists for component: INREXT   No results for input(s): FE, TIBC, PSAT, FERR in the last 72 hours. No results found for: FOL, RBCF   No results for input(s): PH, PCO2, PO2 in the last 72 hours.   Recent Labs      03/15/18   1607   TROIQ  <0.04     Lab Results   Component Value Date/Time    Cholesterol, total 265 (H) 03/16/2018 04:25 AM    HDL Cholesterol 37 03/16/2018 04:25 AM    LDL, calculated 209.6 (H) 03/16/2018 04:25 AM    Triglyceride 92 03/16/2018 04:25 AM    CHOL/HDL Ratio 7.2 (H) 03/16/2018 04:25 AM     Lab Results   Component Value Date/Time    Glucose (POC) 409 (H) 03/16/2018 07:04 AM    Glucose (POC) 417 (H) 03/16/2018 07:02 AM    Glucose (POC) 168 (H) 03/15/2018 10:08 PM    Glucose (POC) 365 (H) 02/21/2014 01:42 PM    Glucose (POC) 350 (H) 02/21/2014 12:31 PM     Lab Results   Component Value Date/Time    Color YELLOW/STRAW 03/15/2018 06:55 PM    Appearance CLEAR 03/15/2018 06:55 PM    Specific gravity 1.020 03/15/2018 06:55 PM    Specific gravity 1.010 06/08/2011 12:00 AM    pH (UA) 6.5 03/15/2018 06:55 PM    Protein >300 (A) 03/15/2018 06:55 PM    Glucose 250 (A) 03/15/2018 06:55 PM    Ketone NEGATIVE  03/15/2018 06:55 PM    Bilirubin NEGATIVE  03/15/2018 06:55 PM    Urobilinogen 2.0 (H) 03/15/2018 06:55 PM    Nitrites NEGATIVE  03/15/2018 06:55 PM    Leukocyte Esterase NEGATIVE  03/15/2018 06:55 PM    Epithelial cells FEW 03/15/2018 06:55 PM    Bacteria 1+ (A) 03/15/2018 06:55 PM    WBC 0-4 03/15/2018 06:55 PM    RBC 20-50 03/15/2018 06:55 PM         Medications Reviewed:     Current Facility-Administered Medications Medication Dose Route Frequency    aspirin (ASPIRIN) tablet 325 mg  325 mg Oral DAILY    amLODIPine (NORVASC) tablet 5 mg  5 mg Oral DAILY    lisinopril (PRINIVIL, ZESTRIL) tablet 10 mg  10 mg Oral DAILY    furosemide (LASIX) tablet 20 mg  20 mg Oral DAILY    metoprolol tartrate (LOPRESSOR) tablet 100 mg  100 mg Oral DAILY    sodium chloride (NS) flush 5-10 mL  5-10 mL IntraVENous Q8H    sodium chloride (NS) flush 5-10 mL  5-10 mL IntraVENous PRN    heparin (porcine) injection 5,000 Units  5,000 Units SubCUTAneous Q8H    acetaminophen (TYLENOL) tablet 650 mg  650 mg Oral Q6H PRN    insulin glargine (LANTUS) injection 30 Units  30 Units SubCUTAneous DAILY    nicotine (NICODERM CQ) 14 mg/24 hr patch 1 Patch  1 Patch TransDERmal DAILY    predniSONE (DELTASONE) tablet 20 mg  20 mg Oral DAILY WITH BREAKFAST    glucose chewable tablet 16 g  4 Tab Oral PRN    dextrose (D50W) injection syrg 12.5-25 g  12.5-25 g IntraVENous PRN    glucagon (GLUCAGEN) injection 1 mg  1 mg IntraMUSCular PRN    insulin lispro (HUMALOG) injection   SubCUTAneous AC&HS    albuterol-ipratropium (DUO-NEB) 2.5 MG-0.5 MG/3 ML  3 mL Nebulization Q4H PRN    sodium chloride (NS) flush 5-10 mL  5-10 mL IntraVENous PRN    piperacillin-tazobactam (ZOSYN) 3.375 g in 0.9% sodium chloride (MBP/ADV) 100 mL  3.375 g IntraVENous Q8H    levoFLOXacin (LEVAQUIN) 750 mg in D5W IVPB  750 mg IntraVENous Q24H     Current Outpatient Prescriptions   Medication Sig    furosemide (LASIX) 20 mg tablet Take 20 mg by mouth daily.  insulin NPH/insulin regular (NOVOLIN 70/30 U-100 INSULIN) 100 unit/mL (70-30) injection 50 Units by SubCUTAneous route two (2) times a day.  baclofen (LIORESAL) 10 mg tablet Take 10 mg by mouth daily as needed.  amLODIPine (NORVASC) 5 mg tablet Take 5 mg by mouth daily.  benazepril (LOTENSIN) 10 mg tablet Take 10 mg by mouth daily.  aspirin (ASPIRIN) 325 mg tablet Take 325 mg by mouth daily.     metoprolol (LOPRESSOR) 50 mg tablet Take 100 mg by mouth daily.      ______________________________________________________________________  EXPECTED LENGTH OF STAY: 3d 2h  ACTUAL LENGTH OF STAY:          Karen Fabian, NP

## 2018-03-16 NOTE — ED NOTES
Pt resting with eyes closed. Arouses to verbal stimuli. Respirations even and unlabored with symmetrical chest rise.

## 2018-03-16 NOTE — PROGRESS NOTES
Hospitalist Progress Note  Willie Samuel student NP  Answering service: [phone] from in house phone        Date of Service:  3/16/2018  NAME:  Christiano Walker  :  1955  MRN:  [id number]      Admission Summary:   Patient is a 60year old female with PMH of DM 2, HTN, Asthma, CAD, PAD with bilateral stents who presents for shortness of breath and bilateral lower leg edema. Symptoms began  and have gradually gotten worse over the the last several days. She come in today because she is unable to walk due to dyspnea. Any activity makes symptoms worse and rest makes it better. Associated symptoms include wheezing and non-productive cough at night. She also had 3 episodes of diarrhea but these symptoms have resolved since admission. She denies sick contacts, headache, fever, chills, weakness, chest pain, hemoptysis, palpitations, nausea, vomiting, urinary issues, or recent falls. Interval history / Subjective:      Patient reports feeling much better than before admission. She is on room air and o2 saturations are 100%. Has a history of smoking 1 pack every 3-4 days since she was a teenager. No history of lung disease other than asthma. Patient only uses rescue inhaler at home but reports she is out. Was previously being treated by her PCP. Per patient asthma has never been an issue. Patient last cigarette was 3 days ago. She wants to quit smoking. Assessment & Plan:     Asthma exacerbation/bronchitis  Ct of chest-1. Scattered groundglass opacities in the lungs more likely related to  underexpansion/atelectasis rather than airspace disease. Several small pulmonary nodules that appear unchanged and likely incidental. Increased mildly enlarged subcarinal lymph node which may be reactive.   Duo nebs PRN q 4  Prednisone 20mg for 5 days  Pulmonary consult   Patient counseled on smoking cessation  Nicotine patch 14 mg daily    SIRS r/t bronchitis  Febrile tachycardic and tachypenic-resolving  U/A negative 1+ bacteria no WBC, +Yeast present  No leukocytosis   Continue levaquin   D/c zosyn  Paired B/c-NGTD pending    PAD with LE edema  Bilateral LE stents at [facility]   Venous duplex negative  Needs statin    BRENNAN Stage III  Cr 1.29 GFR 51  Hold lisinopril  Strict I/o    Hyperlipidemia  Not currently on statin per PTA med list  Add pravachol 40 mg at bedtime     DM2   A1c 8.5   [facility]  Diabetic diet  Continue Sliding scale insulin  Continue Lantus 30 units daily    CAD  Continue ASA, statis, BB  Needs a statin  Echo ordered  Encouraged O/P follow up with cardiologist    History of HTN  Controlled with norvasc and lisinopril    Code status: full    DVT prophylaxis: SCD    Care Plan discussed with: Patient/Family and Nurse  Disposition: Home w/Family and TBD     Hospital Problems  Date Reviewed: 3/15/2018          Codes Class Noted POA    Bilateral leg edema ICD-10-CM: R60.0  ICD-9-CM: 782.3  3/15/2018 Unknown        Fever ICD-10-CM: R50.9  ICD-9-CM: 780.60  3/15/2018 Unknown        * (Principal)SIRS (systemic inflammatory response syndrome) ([facility] [address] ICD-10-CM: R65.10  ICD-9-CM: 995.90  3/15/2018 Unknown        Asthma exacerbation ICD-10-CM: J45.901  ICD-9-CM: 493.92  3/15/2018 Unknown                Review of Systems:   A comprehensive review of systems was negative except for that written in the HPI. Vital Signs:    Last 24hrs VS reviewed since prior progress note. Most recent are:  Visit Vitals    /73    Pulse (!) 103    Temp 98.6 °F (37 °C)    Resp 20    Ht 5' 6\" (1.676 m)    Wt 89.6 kg (197 lb 8 oz)    SpO2 99%    BMI 31.88 kg/m2       No intake or output data in the 24 hours ending 03/16/18 0949     Physical Examination:             Constitutional:  No acute distress, cooperative, pleasant    ENT:  Oral mucous moist, oropharynx benign. Neck supple,    Resp:  Bilaterally wheezing. No rhonchi/rales.  No accessory muscle use   CV:  Regular rhythm, normal rate, no murmurs, gallops, rubs    GI:  Soft, non distended, non tender. normoactive bowel sounds     Musculoskeletal:  +1 Bilateral LE edema edema, warm, 1+ pulses throughout    Neurologic:  Moves all extremities. AAOx3,      Psych:  Fair insight, Not anxious nor agitated. Skin:  Good turgor, no rashes or ulcers       Data Review:    Review and/or order of clinical lab test      Labs:     Recent Labs      03/16/18   0425  03/15/18   1607   WBC  6.5  8.0   HGB  11.1*  11.5   HCT  32.8*  34.6*   PLT  269  264     Recent Labs      03/16/18   0425  03/15/18   1607   NA  136  138   K  4.3  4.0   CL  103  103   CO2  24  28   BUN  18  16   CREA  1.29*  1.08*   GLU  409*  227*   CA  8.1*  8.2*     Recent Labs      03/15/18   1607   SGOT  28   ALT  20   AP  137*   TBILI  0.3   TP  7.2   ALB  2.6*   GLOB  4.6*     No results for input(s): INR, PTP, APTT in the last 72 hours. No lab exists for component: INREXT   No results for input(s): FE, TIBC, PSAT, FERR in the last 72 hours. No results found for: FOL, RBCF   No results for input(s): PH, PCO2, PO2 in the last 72 hours.   Recent Labs      03/15/18   1607   TROIQ  <0.04     Lab Results   Component Value Date/Time    Cholesterol, total 265 (H) 03/16/2018 04:25 AM    HDL Cholesterol 37 03/16/2018 04:25 AM    LDL, calculated 209.6 (H) 03/16/2018 04:25 AM    Triglyceride 92 03/16/2018 04:25 AM    CHOL/HDL Ratio 7.2 (H) 03/16/2018 04:25 AM     Lab Results   Component Value Date/Time    Glucose (POC) 409 (H) 03/16/2018 07:04 AM    Glucose (POC) 417 (H) 03/16/2018 07:02 AM    Glucose (POC) 168 (H) 03/15/2018 10:08 PM    Glucose (POC) 365 (H) 02/21/2014 01:42 PM    Glucose (POC) 350 (H) 02/21/2014 12:31 PM     Lab Results   Component Value Date/Time    Color YELLOW/STRAW 03/15/2018 06:55 PM    Appearance CLEAR 03/15/2018 06:55 PM    Specific gravity 1.020 03/15/2018 06:55 PM    Specific gravity 1.010 06/08/2011 12:00 AM    pH (UA) 6.5 03/15/2018 06:55 PM    Protein >300 (A) 03/15/2018 06:55 PM    Glucose 250 (A) 03/15/2018 06:55 PM    Ketone NEGATIVE  03/15/2018 06:55 PM    Bilirubin NEGATIVE  03/15/2018 06:55 PM    Urobilinogen 2.0 (H) 03/15/2018 06:55 PM    Nitrites NEGATIVE  03/15/2018 06:55 PM    Leukocyte Esterase NEGATIVE  03/15/2018 06:55 PM    Epithelial cells FEW 03/15/2018 06:55 PM    Bacteria 1+ (A) 03/15/2018 06:55 PM    WBC 0-4 03/15/2018 06:55 PM    RBC 20-50 03/15/2018 06:55 PM         Medications Reviewed:     Current Facility-Administered Medications   Medication Dose Route Frequency    aspirin (ASPIRIN) tablet 325 mg  325 mg Oral DAILY    amLODIPine (NORVASC) tablet 5 mg  5 mg Oral DAILY    lisinopril (PRINIVIL, ZESTRIL) tablet 10 mg  10 mg Oral DAILY    furosemide (LASIX) tablet 20 mg  20 mg Oral DAILY    metoprolol tartrate (LOPRESSOR) tablet 100 mg  100 mg Oral DAILY    sodium chloride (NS) flush 5-10 mL  5-10 mL IntraVENous Q8H    sodium chloride (NS) flush 5-10 mL  5-10 mL IntraVENous PRN    heparin (porcine) injection 5,000 Units  5,000 Units SubCUTAneous Q8H    acetaminophen (TYLENOL) tablet 650 mg  650 mg Oral Q6H PRN    insulin glargine (LANTUS) injection 30 Units  30 Units SubCUTAneous DAILY    glucose chewable tablet 16 g  4 Tab Oral PRN    dextrose (D50W) injection syrg 12.5-25 g  12.5-25 g IntraVENous PRN    glucagon (GLUCAGEN) injection 1 mg  1 mg IntraMUSCular PRN    insulin lispro (HUMALOG) injection   SubCUTAneous AC&HS    albuterol-ipratropium (DUO-NEB) 2.5 MG-0.5 MG/3 ML  3 mL Nebulization Q4H PRN    sodium chloride (NS) flush 5-10 mL  5-10 mL IntraVENous PRN    piperacillin-tazobactam (ZOSYN) 3.375 g in 0.9% sodium chloride (MBP/ADV) 100 mL  3.375 g IntraVENous Q8H    levoFLOXacin (LEVAQUIN) 750 mg in D5W IVPB  750 mg IntraVENous Q24H     Current Outpatient Prescriptions   Medication Sig    furosemide (LASIX) 20 mg tablet Take 20 mg by mouth daily.     insulin NPH/insulin regular (NOVOLIN 70/30 U-100 INSULIN) 100 unit/mL (70-30) injection 50 Units by SubCUTAneous route two (2) times a day.  baclofen (LIORESAL) 10 mg tablet Take 10 mg by mouth daily as needed.  amLODIPine (NORVASC) 5 mg tablet Take 5 mg by mouth daily.  benazepril (LOTENSIN) 10 mg tablet Take 10 mg by mouth daily.  aspirin (ASPIRIN) 325 mg tablet Take 325 mg by mouth daily.  metoprolol (LOPRESSOR) 50 mg tablet Take 100 mg by mouth daily.      ______________________________________________________________________  EXPECTED LENGTH OF STAY: 3d 2h  ACTUAL LENGTH OF STAY:          1                 Sandra Joe

## 2018-03-16 NOTE — CONSULTS
[facility]    INTERVAL HISTORY / SUBJECTIVE:  Hospital Day: 2     3/16/2018    Reason For Consult:   Christiano Walker is a 60 y.o. BLACK OR  female is admitted on 3/15/2018 by Rashel Parra MD for whom we were asked see in consultation for Dyspnea. Chief complaint of continuous moderate difficulty breathing over several days. Course has been gradually worsening and is exacerbated by any exercise but relieved by rest.                        ROS:  SOB and Cough and wheeze - Other than noted above, a 12 point review of systems is negative for any other constitutional, opthalmologic, ENT, cardiovascular, pulmonary, gastrointestinal, urinary, neurologic, psychiatric, lymphatic, hematologic, oncologic,  integument or musculoskeletal issues. Patient is a pleasant 78-year-old female with multiple problems including ongoing tobacco abuse for which we counseled cessation. She has active bronchospasm on physical exam and had a two-week prodrome prior to this with an upper respiratory tract infection. She is on nebulizers antibiotics and steroids. Likely has underlying COPD. I would add an alpha-1 antitrypsin level to her next labs as well as IgE, allergy profile as well. CT personally visualized have minor lymphadenopathy of 1.4 cm at station 7. Scattered several millimeter pulmonary nodules which will need repeat imaging at 3 months with contrast for the lymphadenopathy and the nodules. Also, she has signs and symptoms of sleep apnea with a class IV airway and snoring. Daytime sleepiness is noted. Discussed with the patient all these problems as well as follow-up. Also spoke with the daughter on the phone. She may go home over the weekend, we will have her office follow-up with her on Monday to schedule follow-up in her COPD clinic as well as establish in our sleep division. Thank you for consultation    ASSESSMENT and PLAN:    1. Tobacco abuse - cessation counseled    2. Bronchospasm  -likely underlying obstructive lung disease-check alpha-1 antitrypsin level as well as IgE allergen profile- I would recommend  discharge on anticholinergic/ beta agonist ( Anoro or Stiolto or the like )  and PRN beta agonist    3. Abnormal CT scan - pulmonary nodules - mild mediastinal lymphadenopathy - repeat imaging in 3 months with contrast    4.  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    Bilateral leg edema R60.0    Fever R50.9    SIRS (systemic inflammatory response syndrome) (HCC) R65.10    Asthma exacerbation J45.901         Medical Decision Making Today  · I have reviewed the flowsheet and previous days notes  · Review and Order of Radiology tests  · Review and Order of Medicine tests  · Independent visualization of Image  · I have personally reviewed the patients ECG / Tele       VITALS    Visit Vitals    /73    Pulse (!) 103    Temp 98.6 °F (37 °C)    Resp 20    Ht 5' 6\" (1.676 m)    Wt 89.6 kg (197 lb 8 oz)    SpO2 99%    BMI 31.88 kg/m2        Temp (24hrs), Av.2 °F (37.3 °C), Min:98.6 °F (37 °C), Max:100.3 °F (37.9 °C)                 No intake or output data in the 24 hours ending 18 1103      Other:     GEN: Nontoxic Nondistressed   EYE: Anicteric Noninjected   ENT: Moist No Thrush   CARD: Regular No murmurs   RESP: Wheezing Equal BS   GI: NABS Nontender   : Clear Urine Normal Genitalia   SKEL: WD WN No Clubbing   SKIN: Perfused No drug rash   NEURO: A & O x3 Nonfocal   PSYCH: Nonagitated Good Insight   LYMPH: No REBEKAH No edema       Recent Labs      18   0425  03/15/18   1810  03/15/18   1607   NA  136   --   138   K  4.3   --   4.0   CL  103   --   103 CO2  24   --   28   BUN  18   --   16   CREA  1.29*   --   1.08*   GLU  409*   --   227*   CA  8.1*   --   8.2*   LAC   --   2.0   --    TBILI   --    --   0.3   ALT   --    --   20   SGOT   --    --   28   AP   --    --   137*   TP   --    --   7.2   ALB   --    --   2.6*   GLOB   --    --   4.6*       Recent Labs      03/16/18   0425   WBC  6.5   HGB  11.1*   PLT  269       Recent Labs      03/15/18   2303   PHI  7.465*   PCO2I  33.2*   PO2I  68*   HCO3I  23.9       XRAY Result:    Results from Hospital Encounter encounter on 03/15/18   XR CHEST PA LAT   Narrative INDICATION: Cough and dyspnea    COMPARISON: September 4, 2017    FINDINGS: PA and lateral views of the chest demonstrate a stable  cardiomediastinal silhouette and clear lungs bilaterally. The visualized osseous  structures are unremarkable. Impression IMPRESSION: No acute process. CT Result:    Results from Hospital Encounter encounter on 03/15/18   CT CHEST WO CONT   Narrative INDICATION: Shortness of breath    COMPARISON: 2/20/2014    CONTRAST: None. TECHNIQUE:  5 mm axial images were obtained through the chest. Coronal and  sagittal reconstructions were generated. CT dose reduction was achieved through  use of a standardized protocol tailored for this examination and automatic  exposure control for dose modulation. The absence of intravenous contrast reduces the sensitivity for evaluation of  the mediastinum and upper abdominal organs. FINDINGS:    THYROID: No nodule. MEDIASTINUM: Several subcentimeter lymph nodes are seen scattered in the  mediastinum. There is an enlarged subcarinal node measuring approximately 1.4  cm. CAITLYN: No mass or lymphadenopathy. THORACIC AORTA: No aneurysm. MAIN PULMONARY ARTERY: Normal in caliber. TRACHEA/BRONCHI: Patent. ESOPHAGUS: No wall thickening or dilatation. HEART: Normal in size. Coronary atherosclerotic calcifications are present. PLEURA: No effusion or pneumothorax.   LUNGS: There is a 3 mm pulmonary nodule in the anterior left upper lobe on image  24. There is a 3 mm pulmonary nodule along the left oblique fissure on image 31. Scattered small areas of groundglass opacity in the lungs are more likely  related to atelectasis than airspace disease. There is a 4 mm juxtapleural  nodule in the posterior right lower lobe on image 37. There is a 3 mm pulmonary  nodule in the posterior right upper lobe on image 23. INCIDENTALLY IMAGED UPPER ABDOMEN: Calcified lymph nodes in the upper abdomen  are likely related to prior granulomatous disease. BONES: No destructive bone lesion. Impression IMPRESSION:    1. Scattered groundglass opacities in the lungs more likely related to  underexpansion/atelectasis rather than airspace disease. 2. Several small pulmonary nodules that appear unchanged and likely incidental.  3. Increased mildly enlarged subcarinal lymph node which may be reactive. PMH:  has a past medical history of Asthma; CAD (coronary artery disease); Diabetes ([address] and Hypertension. PSH:   has a past surgical history that includes hx coronary stent placement; delivery ; hx hysterectomy; pr cardiac surg procedure unlist; and vascular surgery procedure unlist.     FHX: family history is not on file. SHX: reports that she has been smoking. She has been smoking about 0.25 packs per day. She does not have any smokeless tobacco history on file. She reports that she does not drink alcohol.     ALL:   Allergies   Allergen Reactions    Ivp Dye [Fd And C Blue No.1] Hives        MEDS:   [x] Reviewed  [] Not reviewed    Current Facility-Administered Medications   Medication Dose Route Frequency    aspirin (ASPIRIN) tablet 325 mg  325 mg Oral DAILY    amLODIPine (NORVASC) tablet 5 mg  5 mg Oral DAILY    lisinopril (PRINIVIL, ZESTRIL) tablet 10 mg  10 mg Oral DAILY    furosemide (LASIX) tablet 20 mg  20 mg Oral DAILY    metoprolol tartrate (LOPRESSOR) tablet 100 mg  100 mg Oral DAILY    sodium chloride (NS) flush 5-10 mL  5-10 mL IntraVENous Q8H    sodium chloride (NS) flush 5-10 mL  5-10 mL IntraVENous PRN    heparin (porcine) injection 5,000 Units  5,000 Units SubCUTAneous Q8H    acetaminophen (TYLENOL) tablet 650 mg  650 mg Oral Q6H PRN    insulin glargine (LANTUS) injection 30 Units  30 Units SubCUTAneous DAILY    nicotine (NICODERM CQ) 14 mg/24 hr patch 1 Patch  1 Patch TransDERmal DAILY    predniSONE (DELTASONE) tablet 20 mg  20 mg Oral DAILY WITH BREAKFAST    glucose chewable tablet 16 g  4 Tab Oral PRN    dextrose (D50W) injection syrg 12.5-25 g  12.5-25 g IntraVENous PRN    glucagon (GLUCAGEN) injection 1 mg  1 mg IntraMUSCular PRN    insulin lispro (HUMALOG) injection   SubCUTAneous AC&HS    albuterol-ipratropium (DUO-NEB) 2.5 MG-0.5 MG/3 ML  3 mL Nebulization Q4H PRN    sodium chloride (NS) flush 5-10 mL  5-10 mL IntraVENous PRN    piperacillin-tazobactam (ZOSYN) 3.375 g in 0.9% sodium chloride (MBP/ADV) 100 mL  3.375 g IntraVENous Q8H    levoFLOXacin (LEVAQUIN) 750 mg in D5W IVPB  750 mg IntraVENous Q24H     Current Outpatient Prescriptions   Medication Sig    furosemide (LASIX) 20 mg tablet Take 20 mg by mouth daily.  insulin NPH/insulin regular (NOVOLIN 70/30 U-100 INSULIN) 100 unit/mL (70-30) injection 50 Units by SubCUTAneous route two (2) times a day.  baclofen (LIORESAL) 10 mg tablet Take 10 mg by mouth daily as needed.  amLODIPine (NORVASC) 5 mg tablet Take 5 mg by mouth daily.  benazepril (LOTENSIN) 10 mg tablet Take 10 mg by mouth daily.  aspirin (ASPIRIN) 325 mg tablet Take 325 mg by mouth daily.  metoprolol (LOPRESSOR) 50 mg tablet Take 100 mg by mouth daily.        Venessa Saunders MD [facility]

## 2018-03-16 NOTE — H&P
[address] [facility]  ACUTE CARE HISTORY AND PHYSICAL    Name:Addis ALEJANDRO  MR#: [id number]  : 1955  ACCOUNT #: [de-identified]   DATE OF SERVICE: 03/15/2018    CHIEF COMPLAINT:  Shortness of breath, leg swelling, diarrhea. HISTORY OF PRESENT ILLNESS:  A 79-year-old -American female with past medical history of type 2 diabetes mellitus, asthma, hypertension, coronary artery disease, peripheral arterial disease status post bilateral lower extremity vascular stents, presented to the emergency department from home with chief complaint of shortness of breath, leg edema and diarrhea. The patient noted onset of symptoms of leg edema starting after her last vascular stent reportedly performed in her right lower extremity at [facility] in 2018. The exact details regarding the surgery are not known, as patient is uncertain of the specific surgery and there are no records available for my current review. The patient reportedly had been seen in followup by her vascular surgeon at [facility] as well as her PCP. Most recent she had been started on furosemide per her daughter's report approximately two weeks ago Yamile Toure has not shown any improvement in her leg edema\". The patient reportedly had onset of shortness of breath, cough, nonproductive of sputum. Symptoms notably have been ongoing for the last few days without specific alleviating factors associated with wheezing. The patient notes she has a history of asthma, however, had not required use of bronchodilators in the recent past.  She also complained of having a fever. She complained of having onset of diarrhea with loose, watery, nonbloody stools, approximately three episodes earlier today which have since resolved. She does not complain of any nausea, vomiting or abdominal pain. She also had a headache, per the ER reports.   She does not complain of any dizziness, lightheadedness, new onset focal weakness, numbness, paresthesias, slurred speech, facial droop, neck pain, back pain, abdominal pain, nausea, vomiting, melena, dysuria, hematuria, rash. On arrival in the emergency department, initial recorded vital signs, blood pressure 132/67, heart rate 101, respiratory rate 24, O2 saturation 97% on room air. Workup included chest x-ray, PA and lateral, which showed no acute cardiopulmonary process. A 12 lead EKG shows sinus tachycardia at 113 beats per minute (which I reviewed). Labs included proBNP 897. Creatinine equaled 1.08 (compared to the last creatinine 0.94 on 2017). Blood glucose equaled 227 mg/dL. Per the ED, the patient was given Decadron 10 mg IV, DuoNeb nebulizer treatment, Tylenol 1000 mg p.o., 0.9% normal saline 1000 mL IV fluid bolus x1. Patient is now seen for admission to the hospitalist service for continued evaluation and treatment. PAST MEDICAL HISTORY:  1. Asthma. 2.  Coronary artery disease. 3.  Type 2 diabetes mellitus. 4.  Hypertension. 5.  Leg edema. 6.  Peripheral arterial disease. PAST SURGICAL HISTORY:    1.  Status post left lower extremity arterial stent placement in 2017 [facility]. 2.  Right lower extremity arterial stent placement 2018 [facility] (per family's report). 3.   section. 4. PTCA and stent. 5.  Hysterectomy. MEDICATIONS:  Complete medication list reviewed and noted on chart records.   amLODIPine (NORVASC) 5 mg tablet  3/15/2018  --  --  Phys Other, MD        Take 5 mg by mouth daily.      aspirin (ASPIRIN) 325 mg tablet  3/15/2018  --  --  Phys Other, MD      Take 325 mg by mouth daily.      baclofen (LIORESAL) 10 mg tablet    --  --  Historical Provider      Take 10 mg by mouth daily as needed.      benazepril (LOTENSIN) 10 mg tablet  3/15/2018  --  --  Phys MD Aftab      Take 10 mg by mouth daily.      furosemide (LASIX) 20 mg tablet  3/15/2018  --  --  Historical Provider      Take 20 mg by mouth daily.     insulin NPH/insulin regular (NOVOLIN 70/30 U-100 INSULIN) 100 unit/mL (70-30) injection  3/15/2018  --  --  Historical Provider      50 Units by SubCUTAneous route two (2) times a day.      metoprolol (LOPRESSOR) 50 mg tablet  3/15/2018  --  --  Tony Ugalde MD      Take 100 mg by mouth daily. ALLERGIES:  IV DYE. SOCIAL HISTORY:  Smokes cigarettes, reportedly quit 2 days ago. Denies alcohol. No reports of illicit drugs. FAMILY HISTORY:  Myocardial infarction, mother. Stroke, brother. Diabetes in sister, brother, daughter. Hypertension, sister and brother. Prostate cancer in father. REVIEW OF SYSTEMS:  All systems reviewed. Pertinent positives were as HPI, otherwise negative. PHYSICAL EXAMINATION:  VITAL SIGNS:  Temperature current 101.2 degrees Fahrenheit, blood pressure 135/54, heart rate 112, respiratory rate is 27, O2 saturation 93% on room air, recorded weight 197 pounds (89.6 kg), recorded height 5 feet 6 inches tall. GENERAL:  Obese female in no acute respiratory distress while at rest.  PSYCHIATRIC:  Patient is awake, alert x3. NEUROLOGIC:  GCS of 15. Moves extremities x4. Sensation grossly intact without slurred speech, facial droop. HEENT:  Normocephalic, atraumatic. PERRLA, EOMs intact, sclerae anicteric, conjunctivae clear. Nares are patent. Oropharynx clear. Tongue is midline, not edematous. NECK:  Supple, without lymphadenopathy, JVD, carotid bruits, thyromegaly. LYMPH:  Negative for cervical, supraclavicular adenopathy. LUNGS:  Diffuse bilateral wheezes. HEART:  Tachycardic, regular rhythm. No murmurs, rubs or gallops. ABDOMEN:  Soft, nontender, nondistended. Normoactive bowel sounds. No rebound, guarding, rigidity. No auscultated abdominal bruits, no palpable abdominal mass. BACK:  No CVA tenderness. No step-off deformity. MUSCULOSKELETAL:  No acute palpable bony deformity. Negative for calf tenderness.   VASCULAR:  Radial 2+ to 1+ dorsalis pedis pulses without cyanosis. Positive for clubbing. There is 4+ pitting edema, bilateral lower extremities. SKIN:  Warm and dry. LABORATORY DATA:  Sodium 138, potassium 4.0, chloride 103, CO2 of 28, BUN 16, creatinine 1.08, glucose 227, anion gap 7, calcium is 8.2. GFR greater than 60. Lactic acid was 2.0. Troponin I of less than 0.04. ProBNP of 897. WBC of 8.0, hemoglobin 11.5, hematocrit 34.6, platelets 634, neutrophils 73%. Urinalysis:  Leukocyte esterase negative, nitrites negative, urobilinogen 2.0, bilirubin negative, blood moderate, ketones negative, glucose 250, protein greater than 300, pH 6.5, specific gravity 1.020, WBCs 0-4, RBCs 20-50, bacteria 1+, budding yeast present. Chest x-ray, PA and lateral, no acute process well. A 12-lead EKG was also noted in HPI. ASSESSMENT AND PLAN:  1. Systemic inflammatory response syndrome. Exact etiology is not certain. Chest x-ray was nonrevealing for pneumonia. Order sepsis protocol considering tachycardia, fever. Check blood cultures. Start on Zosyn, levofloxacin for IV antibiotics. 2.  Asthma exacerbation with likely acute bronchitis. Order DuoNeb nebulizer treatments q.4h. p.r.n. The patient had been given Decadron per the ED. May continue with steroid IV therapy and taper. Place on oxygen therapy and pulse oximetry. 3.  Fever. Plan as noted above. Order Tylenol 650 mg p.o. q.6 hours p.r.n.  4.  Diarrhea. Order Clostridium difficile, stool occult blood test.    5.  Bilateral lower extremity edema. ProBNP was borderline at 897. Order a 2D echocardiogram.  Place on that strict I's and O's. Cautious with IV fluids in order to prevent any volume overload. Order venous duplex in bilateral extremities to rule out DVT. Keep legs elevated at rest.  6.  Tachycardia. Continue telemetry monitoring. 7.  Hypoxia. Order stat ABG. Also order CT of the chest without contrast (PATIENT HAS IV DYE ALLERGY and elevated creatinine).   Would like to rule out any possible underlying pneumonia or acute processes. 8.  Type 2 diabetes mellitus. Place on Humalog insulin correctional coverage, scheduled Accu-Cheks and check hemoglobin A1c level in a.m. 9.  Coronary artery disease. Patient has been on aspirin therapy. 10.  Hypertension. Monitor blood pressure closely. Resume on home medications. 11.  Tobacco abuse. Strongly encouraged smoking cessation. Order nicotine patch. 12.  History of vascular stent. Would like to obtain additional information, so will request records from Marija Demarco for the same. 13. VTE prophylaxis. Order heparin 5000 units subQ q.8 hours. MD ANGE Campbell / Colette Barnett  D: 03/15/2018 22:05     T: 03/16/2018 00:25  JOB #: [id number]

## 2018-03-16 NOTE — DIABETES MGMT
DTC Consult Note    Recommendations/ Comments:  Hyperglycemia, likely due to steroids. Pt received today 27 units of correction insulin since 7am.      Spoke with NP Marilu Henao and plan is to change Lantus to 35 units BID and change the correction scale to insulin resistant scale. NP will reassess with PM blood glucose and add mealtime insulin. Current hospital DM medication: new orders: Lantus 35 units BID, Humalog for correction, insulin resistant scale     Consult received for:  [x]             Assessment of home management                  Chart reviewed and initial evaluation complete on Jacki Romero. Patient is a 60 y.o. female with hx Type 2 Diabetes on Novolin 70/30 50 units BID at home. BG monitoring 1-2 times a week. She reports that her meter broke so pt was provided with a new One Touch Ultra Mini meter. Assessed and instructed patient on the following:   ·  interpretation of lab results, blood sugar goals, complications of diabetes mellitus, hypoglycemia prevention and treatment, nutrition, referred to Diabetes Educator, site rotation and smoke cessation ( pt reports to smoke 10 cigarettes a day but stopped 3 days ago,she is wearing a patch)    Encouraged the following:   · dietary modifications: avoid concentrated sweets, regular blood sugar monitorin-3 times daily, DSME    Provided patient with the following: [x]             Survival skills education materials               []             Insulin education materials               []             CHO counting education materials               [x]             Outpatient DTC contact number               [x]             Glucometer                 Discussed with patient and/or family need for follow up appointment for diabetes management after discharge.       A1c:   Lab Results   Component Value Date/Time    Hemoglobin A1c 8.5 (H) 2018 04:25 AM       Recent Glucose Results:   Lab Results   Component Value Date/Time    GLU 409 (H) 03/16/2018 04:25 AM    GLUCPOC 402 (H) 03/16/2018 11:19 AM    GLUCPOC 409 (H) 03/16/2018 07:04 AM    GLUCPOC 417 (H) 03/16/2018 07:02 AM        Lab Results   Component Value Date/Time    Creatinine 1.29 (H) 03/16/2018 04:25 AM     Estimated Creatinine Clearance: 50.3 mL/min (based on Cr of 1.29). Active Orders   Diet    DIET DIABETIC CONSISTENT CARB Regular        PO intake: No data found. Will continue to follow as needed. Thank you.     Brannon Nguyen, [address] [place] [address]  [facility]  Pager: [phone]

## 2018-03-16 NOTE — PROGRESS NOTES
Admission Medication Reconciliation:    Information obtained from: Daughter, patient, RX Query    Significant PMH/Disease States:   Past Medical History:   Diagnosis Date    Asthma     CAD (coronary artery disease)     Diabetes ([facility] [address]     Hypertension        Chief Complaint for this Admission:  SOB, cough, CP    Allergies:  Ivp dye [fd and c blue no. 1]    Prior to Admission Medications:   Prior to Admission Medications   Prescriptions Last Dose Informant Patient Reported? Taking? amLODIPine (NORVASC) 5 mg tablet 3/15/2018 at Unknown time Family Member Yes Yes   Sig: Take 5 mg by mouth daily. aspirin (ASPIRIN) 325 mg tablet 3/15/2018 at Unknown time Family Member Yes Yes   Sig: Take 325 mg by mouth daily. baclofen (LIORESAL) 10 mg tablet   Yes Yes   Sig: Take 10 mg by mouth daily as needed. benazepril (LOTENSIN) 10 mg tablet 3/15/2018 at Unknown time Family Member Yes Yes   Sig: Take 10 mg by mouth daily. furosemide (LASIX) 20 mg tablet 3/15/2018 at Unknown time  Yes Yes   Sig: Take 20 mg by mouth daily. insulin NPH/insulin regular (NOVOLIN 70/30 U-100 INSULIN) 100 unit/mL (70-30) injection 3/15/2018 at Unknown time  Yes Yes   Si Units by SubCUTAneous route two (2) times a day. metoprolol (LOPRESSOR) 50 mg tablet 3/15/2018 at Unknown time Family Member Yes Yes   Sig: Take 100 mg by mouth daily. Facility-Administered Medications: None         Comments/Recommendations: Patient wasn't terribly reliable historian, daughters provided supplemental information. Allergy was confirmed. Please note:  1. INSULIN: stated explicitly that she no longer takes Lantus or Glulisine and only takes Relion Novolin 70-30, 50 units twice daily. Added:  1. Novolin 70-30  2. Baclofen  3. Furosemide    Revised:  1. Metoprolol    Deleted:  1. Lantus, glulisine insulins  2. Pravastatin    Thank you for allowing me to participate in the care of your patient.     Haylie MccormickD, RN #[id number]

## 2018-03-17 LAB
ATRIAL RATE: 113 BPM
B PERT DNA SPEC QL NAA+PROBE: NOT DETECTED
BASOPHILS # BLD: 0 K/UL (ref 0–0.1)
BASOPHILS NFR BLD: 0 % (ref 0–1)
BLASTS NFR BLD MANUAL: 0 %
C PNEUM DNA SPEC QL NAA+PROBE: NOT DETECTED
CALCULATED P AXIS, ECG09: 57 DEGREES
CALCULATED R AXIS, ECG10: -14 DEGREES
CALCULATED T AXIS, ECG11: 76 DEGREES
DIAGNOSIS, 93000: NORMAL
DIFFERENTIAL METHOD BLD: ABNORMAL
EOSINOPHIL # BLD: 0 K/UL (ref 0–0.4)
EOSINOPHIL NFR BLD: 0 % (ref 0–7)
ERYTHROCYTE [DISTWIDTH] IN BLOOD BY AUTOMATED COUNT: 13.9 % (ref 11.5–14.5)
FLUAV H1 2009 PAND RNA SPEC QL NAA+PROBE: NOT DETECTED
FLUAV H1 RNA SPEC QL NAA+PROBE: NOT DETECTED
FLUAV H3 RNA SPEC QL NAA+PROBE: DETECTED
FLUAV SUBTYP SPEC NAA+PROBE: NOT DETECTED
FLUBV RNA SPEC QL NAA+PROBE: NOT DETECTED
GLUCOSE BLD STRIP.AUTO-MCNC: 189 MG/DL (ref 65–100)
GLUCOSE BLD STRIP.AUTO-MCNC: 227 MG/DL (ref 65–100)
GLUCOSE BLD STRIP.AUTO-MCNC: 273 MG/DL (ref 65–100)
GLUCOSE BLD STRIP.AUTO-MCNC: 281 MG/DL (ref 65–100)
HADV DNA SPEC QL NAA+PROBE: NOT DETECTED
HCOV 229E RNA SPEC QL NAA+PROBE: NOT DETECTED
HCOV HKU1 RNA SPEC QL NAA+PROBE: NOT DETECTED
HCOV NL63 RNA SPEC QL NAA+PROBE: NOT DETECTED
HCOV OC43 RNA SPEC QL NAA+PROBE: NOT DETECTED
HCT VFR BLD AUTO: 31.2 % (ref 35–47)
HGB BLD-MCNC: 10.5 G/DL (ref 11.5–16)
HMPV RNA SPEC QL NAA+PROBE: NOT DETECTED
HPIV1 RNA SPEC QL NAA+PROBE: NOT DETECTED
HPIV2 RNA SPEC QL NAA+PROBE: NOT DETECTED
HPIV3 RNA SPEC QL NAA+PROBE: NOT DETECTED
HPIV4 RNA SPEC QL NAA+PROBE: NOT DETECTED
IMM GRANULOCYTES # BLD: 0 K/UL
IMM GRANULOCYTES NFR BLD AUTO: 0 %
LYMPHOCYTES # BLD: 1.3 K/UL (ref 0.8–3.5)
LYMPHOCYTES NFR BLD: 16 % (ref 12–49)
M PNEUMO DNA SPEC QL NAA+PROBE: NOT DETECTED
MCH RBC QN AUTO: 32.4 PG (ref 26–34)
MCHC RBC AUTO-ENTMCNC: 33.7 G/DL (ref 30–36.5)
MCV RBC AUTO: 96.3 FL (ref 80–99)
METAMYELOCYTES NFR BLD MANUAL: 0 %
MONOCYTES # BLD: 0.6 K/UL (ref 0–1)
MONOCYTES NFR BLD: 7 % (ref 5–13)
MYELOCYTES NFR BLD MANUAL: 0 %
NEUTS BAND NFR BLD MANUAL: 0 % (ref 0–6)
NEUTS SEG # BLD: 6 K/UL (ref 1.8–8)
NEUTS SEG NFR BLD: 77 % (ref 32–75)
NRBC # BLD: 0 K/UL (ref 0–0.01)
NRBC BLD-RTO: 0 PER 100 WBC
OTHER CELLS NFR BLD MANUAL: 0 %
P-R INTERVAL, ECG05: 142 MS
PLATELET # BLD AUTO: 294 K/UL (ref 150–400)
PMV BLD AUTO: 9.7 FL (ref 8.9–12.9)
PROMYELOCYTES NFR BLD MANUAL: 0 %
Q-T INTERVAL, ECG07: 352 MS
QRS DURATION, ECG06: 88 MS
QTC CALCULATION (BEZET), ECG08: 482 MS
RBC # BLD AUTO: 3.24 M/UL (ref 3.8–5.2)
RBC MORPH BLD: ABNORMAL
RSV RNA SPEC QL NAA+PROBE: NOT DETECTED
RV+EV RNA SPEC QL NAA+PROBE: NOT DETECTED
SERVICE CMNT-IMP: ABNORMAL
VENTRICULAR RATE, ECG03: 113 BPM
WBC # BLD AUTO: 7.9 K/UL (ref 3.6–11)

## 2018-03-17 PROCEDURE — 74011636637 HC RX REV CODE- 636/637: Performed by: NURSE PRACTITIONER

## 2018-03-17 PROCEDURE — 82785 ASSAY OF IGE: CPT | Performed by: FAMILY MEDICINE

## 2018-03-17 PROCEDURE — 87581 M.PNEUMON DNA AMP PROBE: CPT | Performed by: NURSE PRACTITIONER

## 2018-03-17 PROCEDURE — 74011000250 HC RX REV CODE- 250: Performed by: NURSE PRACTITIONER

## 2018-03-17 PROCEDURE — 65270000029 HC RM PRIVATE

## 2018-03-17 PROCEDURE — 36415 COLL VENOUS BLD VENIPUNCTURE: CPT | Performed by: FAMILY MEDICINE

## 2018-03-17 PROCEDURE — 94640 AIRWAY INHALATION TREATMENT: CPT

## 2018-03-17 PROCEDURE — 86003 ALLG SPEC IGE CRUDE XTRC EA: CPT | Performed by: FAMILY MEDICINE

## 2018-03-17 PROCEDURE — 74011250636 HC RX REV CODE- 250/636: Performed by: FAMILY MEDICINE

## 2018-03-17 PROCEDURE — 82962 GLUCOSE BLOOD TEST: CPT

## 2018-03-17 PROCEDURE — 85007 BL SMEAR W/DIFF WBC COUNT: CPT | Performed by: FAMILY MEDICINE

## 2018-03-17 PROCEDURE — 82103 ALPHA-1-ANTITRYPSIN TOTAL: CPT | Performed by: FAMILY MEDICINE

## 2018-03-17 PROCEDURE — 74011250637 HC RX REV CODE- 250/637: Performed by: FAMILY MEDICINE

## 2018-03-17 PROCEDURE — 74011250637 HC RX REV CODE- 250/637: Performed by: NURSE PRACTITIONER

## 2018-03-17 RX ORDER — INSULIN LISPRO 100 [IU]/ML
6 INJECTION, SOLUTION INTRAVENOUS; SUBCUTANEOUS
Status: DISCONTINUED | OUTPATIENT
Start: 2018-03-17 | End: 2018-03-20 | Stop reason: HOSPADM

## 2018-03-17 RX ORDER — AMOXICILLIN AND CLAVULANATE POTASSIUM 875; 125 MG/1; MG/1
1 TABLET, FILM COATED ORAL EVERY 12 HOURS
Status: DISCONTINUED | OUTPATIENT
Start: 2018-03-17 | End: 2018-03-20 | Stop reason: HOSPADM

## 2018-03-17 RX ORDER — GUAIFENESIN 600 MG/1
600 TABLET, EXTENDED RELEASE ORAL EVERY 12 HOURS
Status: DISCONTINUED | OUTPATIENT
Start: 2018-03-17 | End: 2018-03-20 | Stop reason: HOSPADM

## 2018-03-17 RX ORDER — OSELTAMIVIR PHOSPHATE 75 MG/1
75 CAPSULE ORAL EVERY 12 HOURS
Status: DISCONTINUED | OUTPATIENT
Start: 2018-03-17 | End: 2018-03-17 | Stop reason: DRUGHIGH

## 2018-03-17 RX ORDER — IPRATROPIUM BROMIDE AND ALBUTEROL SULFATE 2.5; .5 MG/3ML; MG/3ML
3 SOLUTION RESPIRATORY (INHALATION)
Status: DISCONTINUED | OUTPATIENT
Start: 2018-03-17 | End: 2018-03-18

## 2018-03-17 RX ORDER — OSELTAMIVIR PHOSPHATE 6 MG/ML
30 FOR SUSPENSION ORAL EVERY 12 HOURS
Status: DISCONTINUED | OUTPATIENT
Start: 2018-03-17 | End: 2018-03-18 | Stop reason: DRUGHIGH

## 2018-03-17 RX ORDER — INSULIN GLARGINE 100 [IU]/ML
32 INJECTION, SOLUTION SUBCUTANEOUS 2 TIMES DAILY
Status: DISCONTINUED | OUTPATIENT
Start: 2018-03-17 | End: 2018-03-19

## 2018-03-17 RX ADMIN — Medication 10 ML: at 14:58

## 2018-03-17 RX ADMIN — IPRATROPIUM BROMIDE AND ALBUTEROL SULFATE 3 ML: .5; 3 SOLUTION RESPIRATORY (INHALATION) at 23:01

## 2018-03-17 RX ADMIN — AMLODIPINE BESYLATE 5 MG: 5 TABLET ORAL at 09:09

## 2018-03-17 RX ADMIN — GUAIFENESIN 600 MG: 600 TABLET, EXTENDED RELEASE ORAL at 20:57

## 2018-03-17 RX ADMIN — IPRATROPIUM BROMIDE AND ALBUTEROL SULFATE 3 ML: .5; 3 SOLUTION RESPIRATORY (INHALATION) at 17:28

## 2018-03-17 RX ADMIN — AMOXICILLIN AND CLAVULANATE POTASSIUM 1 TABLET: 875; 125 TABLET, FILM COATED ORAL at 20:57

## 2018-03-17 RX ADMIN — INSULIN LISPRO 4 UNITS: 100 INJECTION, SOLUTION INTRAVENOUS; SUBCUTANEOUS at 07:31

## 2018-03-17 RX ADMIN — INSULIN GLARGINE 30 UNITS: 100 INJECTION, SOLUTION SUBCUTANEOUS at 09:23

## 2018-03-17 RX ADMIN — LISINOPRIL 10 MG: 10 TABLET ORAL at 09:08

## 2018-03-17 RX ADMIN — Medication 10 ML: at 14:57

## 2018-03-17 RX ADMIN — Medication 10 ML: at 06:52

## 2018-03-17 RX ADMIN — IPRATROPIUM BROMIDE AND ALBUTEROL SULFATE 3 ML: .5; 3 SOLUTION RESPIRATORY (INHALATION) at 20:20

## 2018-03-17 RX ADMIN — INSULIN LISPRO 7 UNITS: 100 INJECTION, SOLUTION INTRAVENOUS; SUBCUTANEOUS at 17:24

## 2018-03-17 RX ADMIN — MONTELUKAST SODIUM 10 MG: 10 TABLET, FILM COATED ORAL at 22:52

## 2018-03-17 RX ADMIN — OSELTAMIVIR PHOSPHATE 30 MG: 6 POWDER, FOR SUSPENSION ORAL at 21:06

## 2018-03-17 RX ADMIN — INSULIN GLARGINE 32 UNITS: 100 INJECTION, SOLUTION SUBCUTANEOUS at 17:40

## 2018-03-17 RX ADMIN — HEPARIN SODIUM 5000 UNITS: 5000 INJECTION, SOLUTION INTRAVENOUS; SUBCUTANEOUS at 06:52

## 2018-03-17 RX ADMIN — ASPIRIN 325 MG: 325 TABLET ORAL at 09:08

## 2018-03-17 RX ADMIN — HEPARIN SODIUM 5000 UNITS: 5000 INJECTION, SOLUTION INTRAVENOUS; SUBCUTANEOUS at 22:52

## 2018-03-17 RX ADMIN — GUAIFENESIN 600 MG: 600 TABLET, EXTENDED RELEASE ORAL at 09:08

## 2018-03-17 RX ADMIN — HEPARIN SODIUM 5000 UNITS: 5000 INJECTION, SOLUTION INTRAVENOUS; SUBCUTANEOUS at 14:58

## 2018-03-17 RX ADMIN — ACETAMINOPHEN 650 MG: 325 TABLET, FILM COATED ORAL at 20:57

## 2018-03-17 RX ADMIN — FUROSEMIDE 20 MG: 40 TABLET ORAL at 09:09

## 2018-03-17 RX ADMIN — PREDNISONE 20 MG: 20 TABLET ORAL at 06:53

## 2018-03-17 RX ADMIN — AMOXICILLIN AND CLAVULANATE POTASSIUM 1 TABLET: 875; 125 TABLET, FILM COATED ORAL at 14:58

## 2018-03-17 RX ADMIN — METOPROLOL TARTRATE 100 MG: 50 TABLET ORAL at 09:08

## 2018-03-17 RX ADMIN — INSULIN LISPRO 6 UNITS: 100 INJECTION, SOLUTION INTRAVENOUS; SUBCUTANEOUS at 17:23

## 2018-03-17 RX ADMIN — INSULIN LISPRO 7 UNITS: 100 INJECTION, SOLUTION INTRAVENOUS; SUBCUTANEOUS at 12:11

## 2018-03-17 NOTE — PROGRESS NOTES
Microbiology called with + results of RVP for Influenza A, RGabo Case [employer] paged and new order received for ed Latif pt made aware

## 2018-03-17 NOTE — PROGRESS NOTES
Bedside shift change report given to [address] [place] [address] (oncoming nurse) by Luna Rojas RN (offgoing nurse). Report included the following information SBAR, Kardex, Intake/Output, MAR and Recent Results.

## 2018-03-17 NOTE — PROGRESS NOTES
03/17/18 0827   Vital Signs   Temp 98.6 °F (37 °C)   Temp Source Oral   Pulse (Heart Rate) (!) 103   Heart Rate Source Monitor   Resp Rate 18   O2 Sat (%) 98 %   Level of Consciousness Alert   BP 95/65   MAP (Calculated) 75   BP 1 Method Automatic   BP 1 Location Left arm   BP Patient Position At rest   MEWS Score 3   Spoke with KEELEY Calero NP caring for patient, NP advised as pt is asymptomatic with MEWS score 3 to re-chceck vs at this time, current vs oral temp 98, pulse 105, resp 18 and /76 MEWS Score now 2, NP to assess patient no new orders at this time

## 2018-03-17 NOTE — PROGRESS NOTES
Hospitalist Progress Note  Neris Guillen NP  Answering service: [phone] OR [phone] from in house phone  Cell: [phone]      Date of Service:  3/17/2018  NAME:  Mariaelena Mata  :  1955  MRN:  [id number]      Admission Summary:   60year old Aa female with PMh of type 2 DM, Asthma, Hypertension, CAD, PAD s/p bilateral stent placement at [facility], and Tobacco Abuse who presented with shortness of breath and mild leg edema. She is likely having an acute asthma exacerbation and likely has COPD but will need formal PFTs for diagnosis    Interval history / Subjective:     I rounded on the patient and discussed with nursing  She is more congested and has had chills this AM  A little tachycardic, will hopefully discharge in AM  Neb machine was requested by patient  She also complains of unsteadiness at home, PT eval     Assessment & Plan:     SIRS(POA)  Fever and tachycardia  Resolving  Likely due to Bronchitis  Will add Resp PCR today    Acute Asthma Exacerbation with Bronchitis and Likely COPD (needs formal PFTs)  Added Prednisone and Singulair  Duonebs, change to scheduled  Added Mucinex  Levaquin and Zosyn started,now Augmentin for 5 days  Smoking cessation counseling  Case management consult for neb machine    Abnormal CT imaging  Chest CT:1. Scattered groundglass opacities in the lungs more likely related to  underexpansion/atelectasis rather than airspace disease.   2. Several small pulmonary nodules that appear unchanged and likely incidental.  3. Increased mildly enlarged subcarinal lymph node which may be reactive  Discussed with Dr. Manjeet Little with Pulm, will need repeat imaging in 3 months (pt and daughter aware    Type 2 DM  Hgb A1C 8.5  Lantus at 60% of mixed insulin dosing, increased to 32 bid and mealtime insulin, takes mixed insulin at home but not on formulary anymore  SSI    Tachycardia  May be due to steroids  On BB  Monitor    Diarrhea  None here  c diff test removed    Bilateral Leg Edema and PAD  Recent bilateral stents  Blt Venous Duplex negative    Hypoxia  ABG confirms  Resolved    CAD  ASA, Statin?, BB  Denies chest pain  Echo pending    Hypertension  Home meds    Hyperlipidemia  Would benefit from Statin    Tobacco Abuse  Nicoderm patch    Obesity  counseled    Debility  Pt eval per pt request    Code status: full  DVT prophylaxis: Heparin    Care Plan discussed with: Patient/Family, Nurse and Consultant Dr. Sammie Alberts  Disposition: Home w/Family and TBD     Hospital Problems  Date Reviewed: 3/15/2018          Codes Class Noted POA    Bilateral leg edema ICD-10-CM: R60.0  ICD-9-CM: 782.3  3/15/2018 Unknown        Fever ICD-10-CM: R50.9  ICD-9-CM: 780.60  3/15/2018 Unknown        * (Principal)SIRS (systemic inflammatory response syndrome) ([facility] [address] ICD-10-CM: R65.10  ICD-9-CM: 995.90  3/15/2018 Unknown        Asthma exacerbation ICD-10-CM: J45.901  ICD-9-CM: 493.92  3/15/2018 Unknown                Review of Systems:   A comprehensive review of systems was negative except for that written in the HPI. Vital Signs:    Last 24hrs VS reviewed since prior progress note. Most recent are:  Visit Vitals    /76 (BP 1 Location: Left arm, BP Patient Position: At rest)    Pulse (!) 105    Temp 98 °F (36.7 °C)    Resp 18    Ht 5' 6\" (1.676 m)    Wt 92.3 kg (203 lb 8 oz)    SpO2 97%    Breastfeeding No    BMI 32.85 kg/m2         Intake/Output Summary (Last 24 hours) at 03/17/18 1326  Last data filed at 03/17/18 0902   Gross per 24 hour   Intake              120 ml   Output                0 ml   Net              120 ml        Physical Examination:             Constitutional:  No acute distress, cooperative, pleasant    ENT:  Oral mucous moist, oropharynx benign. Neck supple,    Resp:  More congestion and expiratory wheezing noted.  No accessory muscle use   CV:  Regular rhythm, normal rate, no murmurs, gallops, rubs GI:  Obese,Soft, non distended, non tender. normoactive bowel sounds, no hepatosplenomegaly     Musculoskeletal:  mild leg edema, warm, nonpalpable pulses, warm to touch    Neurologic:  Moves all extremities. AAOx3, gait not tested     Psych:  Good insight, Not anxious nor agitated. Skin:  Good turgor, no rashes or ulcers  Eyes:  EOMI. Anicteric sclerae, PERRL. Data Review:    Review and/or order of clinical lab test  Review and/or order of tests in the radiology section of [facility]      Labs:     Recent Labs      03/17/18   0204  03/16/18   0425   WBC  7.9  6.5   HGB  10.5*  11.1*   HCT  31.2*  32.8*   PLT  294  269     Recent Labs      03/16/18   0425  03/15/18   1607   NA  136  138   K  4.3  4.0   CL  103  103   CO2  24  28   BUN  18  16   CREA  1.29*  1.08*   GLU  409*  227*   CA  8.1*  8.2*     Recent Labs      03/15/18   1607   SGOT  28   ALT  20   AP  137*   TBILI  0.3   TP  7.2   ALB  2.6*   GLOB  4.6*     No results for input(s): INR, PTP, APTT in the last 72 hours. No lab exists for component: INREXT, INREXT   No results for input(s): FE, TIBC, PSAT, FERR in the last 72 hours. No results found for: FOL, RBCF   No results for input(s): PH, PCO2, PO2 in the last 72 hours.   Recent Labs      03/15/18   1607   TROIQ  <0.04     Lab Results   Component Value Date/Time    Cholesterol, total 265 (H) 03/16/2018 04:25 AM    HDL Cholesterol 37 03/16/2018 04:25 AM    LDL, calculated 209.6 (H) 03/16/2018 04:25 AM    Triglyceride 92 03/16/2018 04:25 AM    CHOL/HDL Ratio 7.2 (H) 03/16/2018 04:25 AM     Lab Results   Component Value Date/Time    Glucose (POC) 273 (H) 03/17/2018 12:10 PM    Glucose (POC) 227 (H) 03/17/2018 07:28 AM    Glucose (POC) 358 (H) 03/16/2018 10:27 PM    Glucose (POC) 292 (H) 03/16/2018 04:42 PM    Glucose (POC) 402 (H) 03/16/2018 11:19 AM     Lab Results   Component Value Date/Time    Color YELLOW/STRAW 03/15/2018 06:55 PM    Appearance CLEAR 03/15/2018 06:55 PM    Specific gravity 1.020 03/15/2018 06:55 PM    Specific gravity 1.010 06/08/2011 12:00 AM    pH (UA) 6.5 03/15/2018 06:55 PM    Protein >300 (A) 03/15/2018 06:55 PM    Glucose 250 (A) 03/15/2018 06:55 PM    Ketone NEGATIVE  03/15/2018 06:55 PM    Bilirubin NEGATIVE  03/15/2018 06:55 PM    Urobilinogen 2.0 (H) 03/15/2018 06:55 PM    Nitrites NEGATIVE  03/15/2018 06:55 PM    Leukocyte Esterase NEGATIVE  03/15/2018 06:55 PM    Epithelial cells FEW 03/15/2018 06:55 PM    Bacteria 1+ (A) 03/15/2018 06:55 PM    WBC 0-4 03/15/2018 06:55 PM    RBC 20-50 03/15/2018 06:55 PM         Medications Reviewed:     Current Facility-Administered Medications   Medication Dose Route Frequency    guaiFENesin ER (MUCINEX) tablet 600 mg  600 mg Oral Q12H    insulin lispro (HUMALOG) injection 6 Units  6 Units SubCUTAneous TIDAC    insulin glargine (LANTUS) injection 32 Units  32 Units SubCUTAneous BID    albuterol-ipratropium (DUO-NEB) 2.5 MG-0.5 MG/3 ML  3 mL Nebulization Q4H RT    amoxicillin-clavulanate (AUGMENTIN) 875-125 mg per tablet 1 Tab  1 Tab Oral Q12H    aspirin (ASPIRIN) tablet 325 mg  325 mg Oral DAILY    amLODIPine (NORVASC) tablet 5 mg  5 mg Oral DAILY    lisinopril (PRINIVIL, ZESTRIL) tablet 10 mg  10 mg Oral DAILY    furosemide (LASIX) tablet 20 mg  20 mg Oral DAILY    metoprolol tartrate (LOPRESSOR) tablet 100 mg  100 mg Oral DAILY    sodium chloride (NS) flush 5-10 mL  5-10 mL IntraVENous Q8H    sodium chloride (NS) flush 5-10 mL  5-10 mL IntraVENous PRN    heparin (porcine) injection 5,000 Units  5,000 Units SubCUTAneous Q8H    acetaminophen (TYLENOL) tablet 650 mg  650 mg Oral Q6H PRN    nicotine (NICODERM CQ) 14 mg/24 hr patch 1 Patch  1 Patch TransDERmal DAILY    predniSONE (DELTASONE) tablet 20 mg  20 mg Oral DAILY WITH BREAKFAST    montelukast (SINGULAIR) tablet 10 mg  10 mg Oral QHS    insulin lispro (HUMALOG) injection   SubCUTAneous AC&HS    glucose chewable tablet 16 g  4 Tab Oral PRN    dextrose (D50W) injection syrg 12.5-25 g  12.5-25 g IntraVENous PRN    glucagon (GLUCAGEN) injection 1 mg  1 mg IntraMUSCular PRN    sodium chloride (NS) flush 5-10 mL  5-10 mL IntraVENous PRN     ______________________________________________________________________  EXPECTED LENGTH OF STAY: 3d 2h  ACTUAL LENGTH OF STAY:          [address] [facility] [address] [facility],

## 2018-03-17 NOTE — PROGRESS NOTES
Primary Nurse Karla Hernandez RN and Mg RN, RN performed a dual skin assessment on this patient No impairment noted    Edema noted on BLE  Scattered scaring noted BLE  Waldo score is 23

## 2018-03-18 LAB
A1AT SERPL-MCNC: 149 MG/DL (ref 90–200)
ANION GAP SERPL CALC-SCNC: 10 MMOL/L (ref 5–15)
BASOPHILS # BLD: 0 K/UL (ref 0–0.1)
BASOPHILS NFR BLD: 0 % (ref 0–1)
BUN SERPL-MCNC: 24 MG/DL (ref 6–20)
BUN/CREAT SERPL: 24 (ref 12–20)
CALCIUM SERPL-MCNC: 8.4 MG/DL (ref 8.5–10.1)
CHLORIDE SERPL-SCNC: 104 MMOL/L (ref 97–108)
CO2 SERPL-SCNC: 25 MMOL/L (ref 21–32)
CREAT SERPL-MCNC: 0.99 MG/DL (ref 0.55–1.02)
DIFFERENTIAL METHOD BLD: ABNORMAL
EOSINOPHIL # BLD: 0 K/UL (ref 0–0.4)
EOSINOPHIL NFR BLD: 0 % (ref 0–7)
ERYTHROCYTE [DISTWIDTH] IN BLOOD BY AUTOMATED COUNT: 13.8 % (ref 11.5–14.5)
GLUCOSE BLD STRIP.AUTO-MCNC: 179 MG/DL (ref 65–100)
GLUCOSE BLD STRIP.AUTO-MCNC: 179 MG/DL (ref 65–100)
GLUCOSE BLD STRIP.AUTO-MCNC: 251 MG/DL (ref 65–100)
GLUCOSE BLD STRIP.AUTO-MCNC: 324 MG/DL (ref 65–100)
GLUCOSE SERPL-MCNC: 139 MG/DL (ref 65–100)
HCT VFR BLD AUTO: 33.3 % (ref 35–47)
HGB BLD-MCNC: 11.1 G/DL (ref 11.5–16)
IMM GRANULOCYTES # BLD: 0.1 K/UL (ref 0–0.04)
IMM GRANULOCYTES NFR BLD AUTO: 1 % (ref 0–0.5)
LYMPHOCYTES # BLD: 2.6 K/UL (ref 0.8–3.5)
LYMPHOCYTES NFR BLD: 33 % (ref 12–49)
MCH RBC QN AUTO: 32 PG (ref 26–34)
MCHC RBC AUTO-ENTMCNC: 33.3 G/DL (ref 30–36.5)
MCV RBC AUTO: 96 FL (ref 80–99)
MONOCYTES # BLD: 0.7 K/UL (ref 0–1)
MONOCYTES NFR BLD: 9 % (ref 5–13)
NEUTS SEG # BLD: 4.4 K/UL (ref 1.8–8)
NEUTS SEG NFR BLD: 56 % (ref 32–75)
NRBC # BLD: 0 K/UL (ref 0–0.01)
NRBC BLD-RTO: 0 PER 100 WBC
PLATELET # BLD AUTO: 325 K/UL (ref 150–400)
PMV BLD AUTO: 9.6 FL (ref 8.9–12.9)
POTASSIUM SERPL-SCNC: 3.4 MMOL/L (ref 3.5–5.1)
RBC # BLD AUTO: 3.47 M/UL (ref 3.8–5.2)
SERVICE CMNT-IMP: ABNORMAL
SODIUM SERPL-SCNC: 139 MMOL/L (ref 136–145)
WBC # BLD AUTO: 7.9 K/UL (ref 3.6–11)

## 2018-03-18 PROCEDURE — 74011636637 HC RX REV CODE- 636/637: Performed by: NURSE PRACTITIONER

## 2018-03-18 PROCEDURE — 36415 COLL VENOUS BLD VENIPUNCTURE: CPT | Performed by: NURSE PRACTITIONER

## 2018-03-18 PROCEDURE — 74011000250 HC RX REV CODE- 250: Performed by: NURSE PRACTITIONER

## 2018-03-18 PROCEDURE — 74011250637 HC RX REV CODE- 250/637: Performed by: NURSE PRACTITIONER

## 2018-03-18 PROCEDURE — 97161 PT EVAL LOW COMPLEX 20 MIN: CPT

## 2018-03-18 PROCEDURE — 82962 GLUCOSE BLOOD TEST: CPT

## 2018-03-18 PROCEDURE — 80048 BASIC METABOLIC PNL TOTAL CA: CPT | Performed by: NURSE PRACTITIONER

## 2018-03-18 PROCEDURE — 65270000029 HC RM PRIVATE

## 2018-03-18 PROCEDURE — 74011250636 HC RX REV CODE- 250/636: Performed by: FAMILY MEDICINE

## 2018-03-18 PROCEDURE — 74011250637 HC RX REV CODE- 250/637: Performed by: FAMILY MEDICINE

## 2018-03-18 PROCEDURE — 85025 COMPLETE CBC W/AUTO DIFF WBC: CPT | Performed by: NURSE PRACTITIONER

## 2018-03-18 PROCEDURE — 74011000250 HC RX REV CODE- 250: Performed by: INTERNAL MEDICINE

## 2018-03-18 PROCEDURE — 94640 AIRWAY INHALATION TREATMENT: CPT

## 2018-03-18 RX ORDER — POTASSIUM CHLORIDE 750 MG/1
20 TABLET, FILM COATED, EXTENDED RELEASE ORAL
Status: COMPLETED | OUTPATIENT
Start: 2018-03-18 | End: 2018-03-18

## 2018-03-18 RX ORDER — OSELTAMIVIR PHOSPHATE 75 MG/1
75 CAPSULE ORAL EVERY 12 HOURS
Status: DISCONTINUED | OUTPATIENT
Start: 2018-03-18 | End: 2018-03-20 | Stop reason: HOSPADM

## 2018-03-18 RX ORDER — IPRATROPIUM BROMIDE AND ALBUTEROL SULFATE 2.5; .5 MG/3ML; MG/3ML
3 SOLUTION RESPIRATORY (INHALATION)
Status: DISCONTINUED | OUTPATIENT
Start: 2018-03-18 | End: 2018-03-20 | Stop reason: HOSPADM

## 2018-03-18 RX ORDER — BUDESONIDE 0.5 MG/2ML
500 INHALANT ORAL
Status: DISCONTINUED | OUTPATIENT
Start: 2018-03-18 | End: 2018-03-19

## 2018-03-18 RX ADMIN — ACETAMINOPHEN 650 MG: 325 TABLET, FILM COATED ORAL at 09:17

## 2018-03-18 RX ADMIN — IPRATROPIUM BROMIDE AND ALBUTEROL SULFATE 3 ML: .5; 3 SOLUTION RESPIRATORY (INHALATION) at 20:25

## 2018-03-18 RX ADMIN — GUAIFENESIN 600 MG: 600 TABLET, EXTENDED RELEASE ORAL at 21:59

## 2018-03-18 RX ADMIN — Medication 10 ML: at 22:01

## 2018-03-18 RX ADMIN — INSULIN LISPRO 7 UNITS: 100 INJECTION, SOLUTION INTRAVENOUS; SUBCUTANEOUS at 17:24

## 2018-03-18 RX ADMIN — AMOXICILLIN AND CLAVULANATE POTASSIUM 1 TABLET: 875; 125 TABLET, FILM COATED ORAL at 09:20

## 2018-03-18 RX ADMIN — PREDNISONE 20 MG: 20 TABLET ORAL at 07:26

## 2018-03-18 RX ADMIN — IPRATROPIUM BROMIDE AND ALBUTEROL SULFATE 3 ML: .5; 3 SOLUTION RESPIRATORY (INHALATION) at 07:55

## 2018-03-18 RX ADMIN — INSULIN LISPRO 3 UNITS: 100 INJECTION, SOLUTION INTRAVENOUS; SUBCUTANEOUS at 07:29

## 2018-03-18 RX ADMIN — HEPARIN SODIUM 5000 UNITS: 5000 INJECTION, SOLUTION INTRAVENOUS; SUBCUTANEOUS at 07:28

## 2018-03-18 RX ADMIN — INSULIN LISPRO 10 UNITS: 100 INJECTION, SOLUTION INTRAVENOUS; SUBCUTANEOUS at 11:37

## 2018-03-18 RX ADMIN — GUAIFENESIN 600 MG: 600 TABLET, EXTENDED RELEASE ORAL at 09:19

## 2018-03-18 RX ADMIN — Medication 10 ML: at 13:46

## 2018-03-18 RX ADMIN — MONTELUKAST SODIUM 10 MG: 10 TABLET, FILM COATED ORAL at 21:59

## 2018-03-18 RX ADMIN — ASPIRIN 325 MG: 325 TABLET ORAL at 09:19

## 2018-03-18 RX ADMIN — OSELTAMIVIR PHOSPHATE 75 MG: 75 CAPSULE ORAL at 21:59

## 2018-03-18 RX ADMIN — INSULIN LISPRO 6 UNITS: 100 INJECTION, SOLUTION INTRAVENOUS; SUBCUTANEOUS at 17:24

## 2018-03-18 RX ADMIN — AMLODIPINE BESYLATE 5 MG: 5 TABLET ORAL at 09:20

## 2018-03-18 RX ADMIN — INSULIN GLARGINE 32 UNITS: 100 INJECTION, SOLUTION SUBCUTANEOUS at 09:24

## 2018-03-18 RX ADMIN — POTASSIUM CHLORIDE 20 MEQ: 750 TABLET, EXTENDED RELEASE ORAL at 09:20

## 2018-03-18 RX ADMIN — IPRATROPIUM BROMIDE AND ALBUTEROL SULFATE 3 ML: .5; 3 SOLUTION RESPIRATORY (INHALATION) at 13:42

## 2018-03-18 RX ADMIN — BUDESONIDE 500 MCG: 0.5 INHALANT RESPIRATORY (INHALATION) at 20:25

## 2018-03-18 RX ADMIN — IPRATROPIUM BROMIDE AND ALBUTEROL SULFATE 3 ML: .5; 3 SOLUTION RESPIRATORY (INHALATION) at 04:41

## 2018-03-18 RX ADMIN — HEPARIN SODIUM 5000 UNITS: 5000 INJECTION, SOLUTION INTRAVENOUS; SUBCUTANEOUS at 15:18

## 2018-03-18 RX ADMIN — OSELTAMIVIR PHOSPHATE 30 MG: 6 POWDER, FOR SUSPENSION ORAL at 09:24

## 2018-03-18 RX ADMIN — HEPARIN SODIUM 5000 UNITS: 5000 INJECTION, SOLUTION INTRAVENOUS; SUBCUTANEOUS at 22:01

## 2018-03-18 RX ADMIN — ACETAMINOPHEN 650 MG: 325 TABLET, FILM COATED ORAL at 03:26

## 2018-03-18 RX ADMIN — INSULIN GLARGINE 32 UNITS: 100 INJECTION, SOLUTION SUBCUTANEOUS at 19:56

## 2018-03-18 RX ADMIN — INSULIN LISPRO 6 UNITS: 100 INJECTION, SOLUTION INTRAVENOUS; SUBCUTANEOUS at 11:38

## 2018-03-18 RX ADMIN — AMOXICILLIN AND CLAVULANATE POTASSIUM 1 TABLET: 875; 125 TABLET, FILM COATED ORAL at 21:58

## 2018-03-18 RX ADMIN — METOPROLOL TARTRATE 100 MG: 50 TABLET ORAL at 09:19

## 2018-03-18 RX ADMIN — LISINOPRIL 10 MG: 10 TABLET ORAL at 09:20

## 2018-03-18 RX ADMIN — FUROSEMIDE 20 MG: 40 TABLET ORAL at 09:20

## 2018-03-18 RX ADMIN — INSULIN LISPRO 6 UNITS: 100 INJECTION, SOLUTION INTRAVENOUS; SUBCUTANEOUS at 07:28

## 2018-03-18 NOTE — PROGRESS NOTES
03/17/18 2101   Vital Signs   Temp (!) 101.8 °F (38.8 °C)   Temp Source Oral   Pulse (Heart Rate) 100   Heart Rate Source Radial   Resp Rate 20   O2 Sat (%) 97 %   Level of Consciousness Alert   /84   MAP (Calculated) 107   BP 1 Method Automatic   BP 1 Location Right arm   BP Patient Position At rest   MEWS Score 3     Interventions: patient received TYLENOL 650mg  Will re-assess in 1hr     MEWS 3 on re-assessment    2300: Paged hospitalist; provided brief SBAR and current patient condition; Orders received: continue to monitor

## 2018-03-18 NOTE — PROGRESS NOTES
Renal Dosing/Monitoring  Medication: oseltamivir (Tamiflu)  Current regimen:  30 mg PO every 12 hours  Recent Labs      03/18/18   0333  03/16/18   0425  03/15/18   1607   CREA  0.99  1.29*  1.08*   BUN  24*  18  16     Estimated CrCl:  66 ml/min  Plan: Change to 75 mg PO every 12 hours due to CrCl >60 mL/min per P&T Renal Dosing Protocol

## 2018-03-18 NOTE — PROGRESS NOTES
ADULT PROTOCOL: JET AEROSOL  REASSESSMENT    Patient  Annel Dunn     60 y.o.   female     3/18/2018  10:20 AM    Breath Sounds Pre Procedure: Right Breath Sounds: Diminished                               Left Breath Sounds: Diminished    Breath Sounds Post Procedure: Right Breath Sounds: Coarse, Scattered wheezing                                 Left Breath Sounds: Coarse, Scattered wheezing    Breathing pattern: Pre procedure Breathing Pattern: Regular          Post procedure Breathing Pattern: Regular    Heart Rate: Pre procedure Pulse: 96           Post procedure Pulse: 119    Resp Rate: Pre procedure Respirations: 18           Post procedure Respirations: 20    Peak Flow: Pre bronchodilator             Post bronchodilator       FVC/FEV1:  .    Incentive Spirometry:             Cough: Pre procedure                 Post procedure      Suctioned: NO    Sputum: Pre procedure                   Post procedure      Oxygen: O2 Device: Room air   ra     Changed: NO    SpO2: Pre procedure SpO2: 95 %   without oxygen              Post procedure SpO2: 98 %  without oxygen    Nebulizer Therapy: Current medications Aerosolized Medications: DuoNeb      Changed: YES    Smoking History: . 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 901       Respiratory Therapist: Jacey Barakat, RT

## 2018-03-18 NOTE — PROGRESS NOTES
Problem: Mobility Impaired (Adult and Pediatric)  Goal: *Acute Goals and Plan of Care (Insert Text)  Physical Therapy Goals  Initiated 3/18/2018    2. Patient will transfer from bed to chair and chair to bed with modified independence using the least restrictive device within 7 day(s). 4.  Patient will ambulate with modified independence for 150 feet with the least restrictive device within 7 day(s). 5.  Patient will ascend/descend 3 stairs with 1 handrail(s) with supervision/set-up within 7 day(s). physical Therapy EVALUATION  Patient: Belle Ferrera (1 y.o. female)  Date: 3/18/2018  Primary Diagnosis: SIRS (systemic inflammatory response syndrome) (HCC)  Fever  Asthma exacerbation  Bilateral leg edema        Precautions: droplet       ASSESSMENT :  Based on the objective data described below, the patient presents with overall supervision level of mobility with some impairment in gait due to bilateral LE pain and fluctuating edema. Today right calf with significant edema and tight vs left. She report some discomfort in bilateral LE during gait and has a slight gait disturbance and tendency to reach out for counter/rail etc.  She states she has been using her sisters rollator recently. She likely could benefit from a rollator her self especially in the community for distance. Will assess next visit. Safe to be up with staff to walk to bathroom and in unger as tolerated. .    Patient will benefit from skilled intervention to address the above impairments.   Patients rehabilitation potential is considered to be Good  Factors which may influence rehabilitation potential include:   [x]         None noted  []         Mental ability/status  []         Medical condition  []         Home/family situation and support systems  []         Safety awareness  []         Pain tolerance/management  []         Other:      PLAN :  Recommendations and Planned Interventions:  []           Bed Mobility Training []    Neuromuscular Re-Education  [x]           Transfer Training                   []    Orthotic/Prosthetic Training  [x]           Gait Training                         []    Modalities  [x]           Therapeutic Exercises           []    Edema Management/Control  [x]           Therapeutic Activities            [x]    Patient and Family Training/Education  []           Other (comment):    Frequency/Duration: Patient will be followed by physical therapy  3 times a week to address goals. Discharge Recommendations: Home Health and None  Further Equipment Recommendations for Discharge: rollator      SUBJECTIVE:   Patient stated I could use a rollator myself.     OBJECTIVE DATA SUMMARY:   HISTORY:    Past Medical History:   Diagnosis Date    Asthma     CAD (coronary artery disease)     Diabetes ([facility] [address]     Hypertension      Past Surgical History:   Procedure Laterality Date    CARDIAC SURG PROCEDURE UNLIST      DELIVERY       HX CORONARY STENT PLACEMENT      HX HYSTERECTOMY      VASCULAR SURGERY PROCEDURE UNLIST      leg stent     Prior Level of Function/Home Situation: modified independent; no falls  Personal factors and/or comorbidities impacting plan of care:     Home Situation  Home Environment: Private residence  # Steps to Enter: 3  Rails to Enter: Yes  Hand Rails : Bilateral  One/Two Story Residence: One story  Living Alone:  (son staying with her for now)  Support Systems: Child(mansoor), [place] / [place]bar[place] community  Patient Expects to be Discharged to[de-identified] Private residence  Current DME Used/Available at Home:  (borrowing sisters rollator)    EXAMINATION/PRESENTATION/DECISION MAKING:   Critical Behavior:   alert and age appropriate           Hearing:   Auditory  Auditory Impairment: None    Edema: Rlower leg edema  Range Of Motion:  AROM: Within functional limits           PROM: Within functional limits           Strength:    Strength: Generally decreased, functional         Tone & Sensation:   Tone: Normal      Sensation: Impaired          Coordination:  Coordination: Within functional limits  Functional Mobility:  Bed Mobility:     Supine to Sit: Modified independent  Sit to Supine: Modified independent     Transfers:  Sit to Stand: Modified independent  Stand to Sit: Modified independent         Balance:   Sitting: Intact  Standing: Impaired; Without support  Standing - Static: Good  Standing - Dynamic : Good  Ambulation/Gait Training:  Distance (ft): 30 Feet (ft)  Assistive Device: Gait belt  Ambulation - Level of Assistance: Contact guard assistance     Gait Description (WDL): Exceptions to WDL  Gait Abnormalities: Ataxic;Decreased step clearance        Base of Support: Widened     Speed/Maryann: Pace decreased (<100 feet/min)  Step Length: Right shortened;Left shortened           Functional Measure:  Tinetti test:    Sitting Balance: 1  Arises: 1  Attempts to Rise: 2  Immediate Standing Balance: 2  Standing Balance: 1  Nudged: 2  Eyes Closed: 1  Turn 360 Degrees - Continuous/Discontinuous: 1  Turn 360 Degrees - Steady/Unsteady: 1  Sitting Down: 1  Balance Score: 13  Indication of Gait: 1  R Step Length/Height: 1  L Step Length/Height: 1  R Foot Clearance: 1  L Foot Clearance: 1  Step Symmetry: 1  Step Continuity: 1  Path: 1  Trunk: 1  Walking Time: 0  Gait Score: 9  Total Score: 22       Tinetti Test and G-code impairment scale:  Percentage of Impairment CH    0%   CI    1-19% CJ    20-39% CK    40-59% CL    60-79% CM    80-99% CN     100%   Tinetti  Score 0-28 28 23-27 17-22 12-16 6-11 1-5 0       Tinetti Tool Score Risk of Falls  <19 = High Fall Risk  19-24 = Moderate Fall Risk  25-28 = Low Fall Risk  Tinetti ME. Performance-Oriented Assessment of Mobility Problems in Elderly Patients. [facility]; [id number].  (Scoring Description: PT Bulletin Feb. 10, 1993)    Older adults: Magdalena Hernandez et al, 2009; n = [address] elderly evaluated with ABC, LIANNE, ADL, and IADL)  · Mean LIANNE score for males aged 69-68 years = 26.21(3.40)  · Mean LIANNE score for females age 69-68 years = 25.16(4.30)  · Mean LIANNE score for males over 80 years = 23.29(6.02)  · Mean LIANNE score for females over 80 years = 17.20(8.32)         G codes: In compliance with CMSs Claims Based Outcome Reporting, the following G-code set was chosen for this patient based on their primary functional limitation being treated: The outcome measure chosen to determine the severity of the functional limitation was the Tinetti with a score of 22/28 which was correlated with the impairment scale. ? Mobility - Walking and Moving Around:     - CURRENT STATUS: CJ - 20%-39% impaired, limited or restricted    - GOAL STATUS: CI - 1%-19% impaired, limited or restricted    - D/C STATUS:  ---------------To be determined---------------      Physical Therapy Evaluation Charge Determination   History Examination Presentation Decision-Making   MEDIUM  Complexity : 1-2 comorbidities / personal factors will impact the outcome/ POC  LOW Complexity : 1-2 Standardized tests and measures addressing body structure, function, activity limitation and / or participation in recreation  LOW Complexity : Stable, uncomplicated  LOW Complexity : FOTO score of       Based on the above components, the patient evaluation is determined to be of the following complexity level: LOW     Pain:  Pain Scale 1: Numeric (0 - 10)  Pain Intensity 1: 5  Pain Location 1: Head     Pain Description 1: Aching  Pain Intervention(s) 1: Medication (see MAR)  Activity Tolerance:   VSS  Please refer to the flowsheet for vital signs taken during this treatment.   After treatment:   []         Patient left in no apparent distress sitting up in chair  [x]         Patient left in no apparent distress in bed  [x]         Call bell left within reach  [x]         Nursing notified  []         Caregiver present  []         Bed alarm activated    COMMUNICATION/EDUCATION:   The patients plan of care was discussed with: Registered Nurse. [x]         Fall prevention education was provided and the patient/caregiver indicated understanding. [x]         Patient/family have participated as able in goal setting and plan of care. [x]         Patient/family agree to work toward stated goals and plan of care. []         Patient understands intent and goals of therapy, but is neutral about his/her participation. []         Patient is unable to participate in goal setting and plan of care.     Thank you for this referral.  Quan Guzman, PT   Time Calculation: 17 mins

## 2018-03-18 NOTE — PROGRESS NOTES
Hospitalist Progress Note  Yg Farah NP  Answering service: [phone] OR [phone] from in house phone  Cell: [phone]      Date of Service:  3/18/2018  NAME:  Peggyann Soulier  :  1955  MRN:  [id number]      Admission Summary:   60year old Aa female with PMh of type 2 DM, Asthma, Hypertension, CAD, PAD s/p bilateral stent placement at [facility], and Tobacco Abuse who presented with shortness of breath and mild leg edema. She is likely having an acute asthma exacerbation and likely has COPD but will need formal PFTs for diagnosis    Interval history / Subjective:   Febrile overnight and this morning to 101.8. Sleeping on my arrival to room this morning so re-visited this afternoon. Feels still wheezy and SOB with talking. No O2. Assessment & Plan:     SIRS(POA) - Related to Influenza A  Fever and tachycardia  Resolving  Respiratory PCR came back on 3/17 with flu  Tamiflu    Acute Asthma Exacerbation with Bronchitis and Likely COPD (needs formal PFTs)  Added Prednisone and Singulair  Duonebs  Mucinex  Added Pulmicort nebs on 3/18  Levaquin and Zosyn initially andnow Augmentin for 5 days + Tamiflu  Smoking cessation counseling  Case management consult for neb machine, discussed again with CM on 3/18    Abnormal CT imaging  Chest CT:1. Scattered groundglass opacities in the lungs more likely related to  underexpansion/atelectasis rather than airspace disease.   2. Several small pulmonary nodules that appear unchanged and likely incidental.  3. Increased mildly enlarged subcarinal lymph node which may be reactive  Discussed with Dr. Yasmani Orozco with Pulm, will need repeat imaging in 3 months (pt and daughter aware    Type 2 DM  Hgb A1C 8.5  Lantus at 60% of mixed insulin dosing, increased to 32 bid and mealtime insulin, takes mixed insulin at home but not on formulary anymore  SSI    Tachycardia  May be due to steroids  On Metoprolol  Monitor    Diarrhea  None here  c diff test removed    Bilateral Leg Edema and PAD  Recent bilateral stents  Blt Venous Duplex negative    Acute Hypoxic respiratory failure  ABG confirmed on 3/15  O2 sats > 98% on RA  Resolved    CAD  ASA, Statin?, BB -> she should discuss statin with PCP given LDL  Denies chest pain  Echo pending    Hypertension  Home meds    Hyperlipidemia  Would benefit from Statin -> needs F/U at discharge with PCP    Tobacco Abuse  Nicoderm patch    Obesity  counseled    Debility  Pt eval per pt request    Code status: full  DVT prophylaxis: Heparin    Care Plan discussed with: Patient, RN, Attending, Case Management  Disposition: Hopefully home Monday     Hospital Problems  Date Reviewed: 3/15/2018          Codes Class Noted POA    Bilateral leg edema ICD-10-CM: R60.0  ICD-9-CM: 782.3  3/15/2018 Unknown        Fever ICD-10-CM: R50.9  ICD-9-CM: 780.60  3/15/2018 Unknown        * (Principal)SIRS (systemic inflammatory response syndrome) ([facility] [address] ICD-10-CM: R65.10  ICD-9-CM: 995.90  3/15/2018 Unknown        Asthma exacerbation ICD-10-CM: J45.901  ICD-9-CM: 493.92  3/15/2018 Unknown                Review of Systems:   A comprehensive review of systems was negative except for that written in the HPI. Vital Signs:    Last 24hrs VS reviewed since prior progress note. Most recent are:  Visit Vitals    /90 (BP Patient Position: Sitting)    Pulse 67    Temp 98.8 °F (37.1 °C)    Resp 18    Ht 5' 6\" (1.676 m)    Wt 91.5 kg (201 lb 12.8 oz)    SpO2 97%    Breastfeeding No    BMI 32.57 kg/m2         Intake/Output Summary (Last 24 hours) at 03/18/18 1515  Last data filed at 03/17/18 1758   Gross per 24 hour   Intake              120 ml   Output                0 ml   Net              120 ml        Physical Examination:             Constitutional:  No acute distress, cooperative, pleasant    ENT:  Oral mucous moist, oropharynx benign. Neck supple   Resp:  + expiratory wheeze.  No crackles/rhonchi. CV:  Regular rhythm, normal rate, no murmurs, gallops, rubs    GI:  Obese,Soft, non distended, non tender. normoactive bowel sounds, no hepatosplenomegaly     Musculoskeletal:  mild leg edema, warm, +1 palpable pulses B/L, warm to touch    Neurologic:  Moves all extremities. AAOx3, speech clear and exam is non-focal     Psych:  Good insight, Not anxious nor agitated. Skin:   Good turgor, no rashes or ulcers  Eyes:  EOMI. Anicteric sclerae, PERRL. Data Review:    Review and/or order of clinical lab test  Review and/or order of tests in the radiology section of [facility]      Labs:     Recent Labs      03/18/18   0333  03/17/18   0204   WBC  7.9  7.9   HGB  11.1*  10.5*   HCT  33.3*  31.2*   PLT  325  294     Recent Labs      03/18/18   0333  03/16/18   0425  03/15/18   1607   NA  139  136  138   K  3.4*  4.3  4.0   CL  104  103  103   CO2  25  24  28   BUN  24*  18  16   CREA  0.99  1.29*  1.08*   GLU  139*  409*  227*   CA  8.4*  8.1*  8.2*     Recent Labs      03/15/18   1607   SGOT  28   ALT  20   AP  137*   TBILI  0.3   TP  7.2   ALB  2.6*   GLOB  4.6*     No results for input(s): INR, PTP, APTT in the last 72 hours. No lab exists for component: INREXT, INREXT   No results for input(s): FE, TIBC, PSAT, FERR in the last 72 hours. No results found for: FOL, RBCF   No results for input(s): PH, PCO2, PO2 in the last 72 hours.   Recent Labs      03/15/18   1607   TROIQ  <0.04     Lab Results   Component Value Date/Time    Cholesterol, total 265 (H) 03/16/2018 04:25 AM    HDL Cholesterol 37 03/16/2018 04:25 AM    LDL, calculated 209.6 (H) 03/16/2018 04:25 AM    Triglyceride 92 03/16/2018 04:25 AM    CHOL/HDL Ratio 7.2 (H) 03/16/2018 04:25 AM     Lab Results   Component Value Date/Time    Glucose (POC) 324 (H) 03/18/2018 11:28 AM    Glucose (POC) 179 (H) 03/18/2018 07:04 AM    Glucose (POC) 189 (H) 03/17/2018 09:31 PM    Glucose (POC) 281 (H) 03/17/2018 05:17 PM    Glucose (POC) 273 (H) 03/17/2018 12:10 PM     Lab Results   Component Value Date/Time    Color YELLOW/STRAW 03/15/2018 06:55 PM    Appearance CLEAR 03/15/2018 06:55 PM    Specific gravity 1.020 03/15/2018 06:55 PM    Specific gravity 1.010 06/08/2011 12:00 AM    pH (UA) 6.5 03/15/2018 06:55 PM    Protein >300 (A) 03/15/2018 06:55 PM    Glucose 250 (A) 03/15/2018 06:55 PM    Ketone NEGATIVE  03/15/2018 06:55 PM    Bilirubin NEGATIVE  03/15/2018 06:55 PM    Urobilinogen 2.0 (H) 03/15/2018 06:55 PM    Nitrites NEGATIVE  03/15/2018 06:55 PM    Leukocyte Esterase NEGATIVE  03/15/2018 06:55 PM    Epithelial cells FEW 03/15/2018 06:55 PM    Bacteria 1+ (A) 03/15/2018 06:55 PM    WBC 0-4 03/15/2018 06:55 PM    RBC 20-50 03/15/2018 06:55 PM         Medications Reviewed:     Current Facility-Administered Medications   Medication Dose Route Frequency    albuterol-ipratropium (DUO-NEB) 2.5 MG-0.5 MG/3 ML  3 mL Nebulization Q6H RT    guaiFENesin ER (MUCINEX) tablet 600 mg  600 mg Oral Q12H    insulin lispro (HUMALOG) injection 6 Units  6 Units SubCUTAneous TIDAC    insulin glargine (LANTUS) injection 32 Units  32 Units SubCUTAneous BID    amoxicillin-clavulanate (AUGMENTIN) 875-125 mg per tablet 1 Tab  1 Tab Oral Q12H    oseltamivir (TAMIFLU) 6 mg/mL oral suspension 30 mg  30 mg Oral Q12H    aspirin (ASPIRIN) tablet 325 mg  325 mg Oral DAILY    amLODIPine (NORVASC) tablet 5 mg  5 mg Oral DAILY    lisinopril (PRINIVIL, ZESTRIL) tablet 10 mg  10 mg Oral DAILY    furosemide (LASIX) tablet 20 mg  20 mg Oral DAILY    metoprolol tartrate (LOPRESSOR) tablet 100 mg  100 mg Oral DAILY    sodium chloride (NS) flush 5-10 mL  5-10 mL IntraVENous Q8H    sodium chloride (NS) flush 5-10 mL  5-10 mL IntraVENous PRN    heparin (porcine) injection 5,000 Units  5,000 Units SubCUTAneous Q8H    acetaminophen (TYLENOL) tablet 650 mg  650 mg Oral Q6H PRN    nicotine (NICODERM CQ) 14 mg/24 hr patch 1 Patch  1 Patch TransDERmal DAILY    predniSONE (DELTASONE) tablet 20 mg  20 mg Oral DAILY WITH BREAKFAST    montelukast (SINGULAIR) tablet 10 mg  10 mg Oral QHS    insulin lispro (HUMALOG) injection   SubCUTAneous AC&HS    glucose chewable tablet 16 g  4 Tab Oral PRN    dextrose (D50W) injection syrg 12.5-25 g  12.5-25 g IntraVENous PRN    glucagon (GLUCAGEN) injection 1 mg  1 mg IntraMUSCular PRN    sodium chloride (NS) flush 5-10 mL  5-10 mL IntraVENous PRN     ______________________________________________________________________  EXPECTED LENGTH OF STAY: 3d 2h  ACTUAL LENGTH OF STAY:          3                 Ayleen Hilliard, NP

## 2018-03-19 ENCOUNTER — HOME HEALTH ADMISSION (OUTPATIENT)
Dept: HOME HEALTH SERVICES | Facility: HOME HEALTH | Age: 63
End: 2018-03-19

## 2018-03-19 VITALS
HEART RATE: 94 BPM | DIASTOLIC BLOOD PRESSURE: 90 MMHG | WEIGHT: 201.72 LBS | OXYGEN SATURATION: 97 % | BODY MASS INDEX: 32.42 KG/M2 | SYSTOLIC BLOOD PRESSURE: 162 MMHG | TEMPERATURE: 98 F | RESPIRATION RATE: 18 BRPM | HEIGHT: 66 IN

## 2018-03-19 PROBLEM — J45.901 ASTHMA EXACERBATION: Status: RESOLVED | Noted: 2018-03-15 | Resolved: 2018-03-19

## 2018-03-19 PROBLEM — R65.10 SIRS (SYSTEMIC INFLAMMATORY RESPONSE SYNDROME) (HCC): Status: RESOLVED | Noted: 2018-03-15 | Resolved: 2018-03-19

## 2018-03-19 PROBLEM — R50.9 FEVER: Status: RESOLVED | Noted: 2018-03-15 | Resolved: 2018-03-19

## 2018-03-19 LAB
GLUCOSE BLD STRIP.AUTO-MCNC: 122 MG/DL (ref 65–100)
GLUCOSE BLD STRIP.AUTO-MCNC: 170 MG/DL (ref 65–100)
GLUCOSE BLD STRIP.AUTO-MCNC: 185 MG/DL (ref 65–100)
SERVICE CMNT-IMP: ABNORMAL

## 2018-03-19 PROCEDURE — 74011250637 HC RX REV CODE- 250/637: Performed by: FAMILY MEDICINE

## 2018-03-19 PROCEDURE — 74011250636 HC RX REV CODE- 250/636: Performed by: FAMILY MEDICINE

## 2018-03-19 PROCEDURE — 74011636637 HC RX REV CODE- 636/637: Performed by: INTERNAL MEDICINE

## 2018-03-19 PROCEDURE — 74011000250 HC RX REV CODE- 250: Performed by: INTERNAL MEDICINE

## 2018-03-19 PROCEDURE — 74011250637 HC RX REV CODE- 250/637: Performed by: INTERNAL MEDICINE

## 2018-03-19 PROCEDURE — 74011250637 HC RX REV CODE- 250/637: Performed by: NURSE PRACTITIONER

## 2018-03-19 PROCEDURE — 74011000250 HC RX REV CODE- 250: Performed by: NURSE PRACTITIONER

## 2018-03-19 PROCEDURE — 82962 GLUCOSE BLOOD TEST: CPT

## 2018-03-19 PROCEDURE — 97116 GAIT TRAINING THERAPY: CPT

## 2018-03-19 PROCEDURE — 94640 AIRWAY INHALATION TREATMENT: CPT

## 2018-03-19 PROCEDURE — 74011636637 HC RX REV CODE- 636/637: Performed by: NURSE PRACTITIONER

## 2018-03-19 RX ORDER — AMOXICILLIN 250 MG
1 CAPSULE ORAL DAILY
Qty: 30 TAB | Refills: 0 | Status: SHIPPED | OUTPATIENT
Start: 2018-03-20 | End: 2018-07-02

## 2018-03-19 RX ORDER — OSELTAMIVIR PHOSPHATE 75 MG/1
75 CAPSULE ORAL EVERY 12 HOURS
Qty: 10 CAP | Refills: 0 | Status: SHIPPED | OUTPATIENT
Start: 2018-03-19 | End: 2018-03-24

## 2018-03-19 RX ORDER — INSULIN GLARGINE 100 [IU]/ML
35 INJECTION, SOLUTION SUBCUTANEOUS 2 TIMES DAILY
Status: DISCONTINUED | OUTPATIENT
Start: 2018-03-20 | End: 2018-03-20 | Stop reason: HOSPADM

## 2018-03-19 RX ORDER — AMOXICILLIN AND CLAVULANATE POTASSIUM 875; 125 MG/1; MG/1
1 TABLET, FILM COATED ORAL EVERY 12 HOURS
Qty: 5 TAB | Refills: 0 | Status: SHIPPED | OUTPATIENT
Start: 2018-03-19 | End: 2018-07-02

## 2018-03-19 RX ORDER — IPRATROPIUM BROMIDE AND ALBUTEROL SULFATE 2.5; .5 MG/3ML; MG/3ML
3 SOLUTION RESPIRATORY (INHALATION)
Qty: 30 NEBULE | Refills: 0 | Status: SHIPPED | OUTPATIENT
Start: 2018-03-19

## 2018-03-19 RX ORDER — MAGNESIUM CITRATE
296 SOLUTION, ORAL ORAL
Status: DISCONTINUED | OUTPATIENT
Start: 2018-03-19 | End: 2018-03-19

## 2018-03-19 RX ORDER — FLUTICASONE PROPIONATE AND SALMETEROL 250; 50 UG/1; UG/1
1 POWDER RESPIRATORY (INHALATION) 2 TIMES DAILY
Qty: 1 INHALER | Refills: 0 | Status: SHIPPED | OUTPATIENT
Start: 2018-03-19 | End: 2018-07-02

## 2018-03-19 RX ORDER — MONTELUKAST SODIUM 10 MG/1
10 TABLET ORAL
Qty: 30 TAB | Refills: 0 | Status: SHIPPED | OUTPATIENT
Start: 2018-03-19

## 2018-03-19 RX ORDER — INSULIN GLARGINE 100 [IU]/ML
35 INJECTION, SOLUTION SUBCUTANEOUS ONCE
Status: COMPLETED | OUTPATIENT
Start: 2018-03-19 | End: 2018-03-19

## 2018-03-19 RX ORDER — ONDANSETRON 2 MG/ML
4 INJECTION INTRAMUSCULAR; INTRAVENOUS
Status: DISCONTINUED | OUTPATIENT
Start: 2018-03-19 | End: 2018-03-20 | Stop reason: HOSPADM

## 2018-03-19 RX ORDER — FACIAL-BODY WIPES
10 EACH TOPICAL
Status: COMPLETED | OUTPATIENT
Start: 2018-03-19 | End: 2018-03-19

## 2018-03-19 RX ORDER — PREDNISONE 20 MG/1
20 TABLET ORAL
Qty: 2 TAB | Refills: 0 | Status: SHIPPED | OUTPATIENT
Start: 2018-03-20 | End: 2018-07-02

## 2018-03-19 RX ORDER — AMOXICILLIN 250 MG
1 CAPSULE ORAL DAILY
Status: DISCONTINUED | OUTPATIENT
Start: 2018-03-20 | End: 2018-03-20 | Stop reason: HOSPADM

## 2018-03-19 RX ORDER — IBUPROFEN 200 MG
1 TABLET ORAL DAILY
Qty: 30 PATCH | Refills: 0 | Status: SHIPPED | OUTPATIENT
Start: 2018-03-20 | End: 2018-04-19

## 2018-03-19 RX ORDER — POLYETHYLENE GLYCOL 3350 17 G/17G
17 POWDER, FOR SOLUTION ORAL DAILY
Status: DISCONTINUED | OUTPATIENT
Start: 2018-03-20 | End: 2018-03-20 | Stop reason: HOSPADM

## 2018-03-19 RX ORDER — FLUTICASONE PROPIONATE AND SALMETEROL 250; 50 UG/1; UG/1
1 POWDER RESPIRATORY (INHALATION)
Status: DISCONTINUED | OUTPATIENT
Start: 2018-03-19 | End: 2018-03-20 | Stop reason: HOSPADM

## 2018-03-19 RX ADMIN — ASPIRIN 325 MG: 325 TABLET ORAL at 09:14

## 2018-03-19 RX ADMIN — BUDESONIDE 500 MCG: 0.5 INHALANT RESPIRATORY (INHALATION) at 08:36

## 2018-03-19 RX ADMIN — IPRATROPIUM BROMIDE AND ALBUTEROL SULFATE 3 ML: .5; 3 SOLUTION RESPIRATORY (INHALATION) at 08:37

## 2018-03-19 RX ADMIN — INSULIN LISPRO 6 UNITS: 100 INJECTION, SOLUTION INTRAVENOUS; SUBCUTANEOUS at 07:37

## 2018-03-19 RX ADMIN — FUROSEMIDE 20 MG: 40 TABLET ORAL at 09:15

## 2018-03-19 RX ADMIN — AMOXICILLIN AND CLAVULANATE POTASSIUM 1 TABLET: 875; 125 TABLET, FILM COATED ORAL at 09:14

## 2018-03-19 RX ADMIN — OSELTAMIVIR PHOSPHATE 75 MG: 75 CAPSULE ORAL at 09:14

## 2018-03-19 RX ADMIN — HEPARIN SODIUM 5000 UNITS: 5000 INJECTION, SOLUTION INTRAVENOUS; SUBCUTANEOUS at 07:38

## 2018-03-19 RX ADMIN — GUAIFENESIN 600 MG: 600 TABLET, EXTENDED RELEASE ORAL at 09:16

## 2018-03-19 RX ADMIN — INSULIN LISPRO 3 UNITS: 100 INJECTION, SOLUTION INTRAVENOUS; SUBCUTANEOUS at 17:11

## 2018-03-19 RX ADMIN — Medication 10 ML: at 13:03

## 2018-03-19 RX ADMIN — PREDNISONE 20 MG: 20 TABLET ORAL at 07:37

## 2018-03-19 RX ADMIN — AMLODIPINE BESYLATE 5 MG: 5 TABLET ORAL at 09:14

## 2018-03-19 RX ADMIN — LISINOPRIL 10 MG: 10 TABLET ORAL at 09:14

## 2018-03-19 RX ADMIN — INSULIN LISPRO 6 UNITS: 100 INJECTION, SOLUTION INTRAVENOUS; SUBCUTANEOUS at 12:06

## 2018-03-19 RX ADMIN — IPRATROPIUM BROMIDE AND ALBUTEROL SULFATE 3 ML: .5; 3 SOLUTION RESPIRATORY (INHALATION) at 14:49

## 2018-03-19 RX ADMIN — ACETAMINOPHEN 650 MG: 325 TABLET, FILM COATED ORAL at 10:50

## 2018-03-19 RX ADMIN — INSULIN LISPRO 3 UNITS: 100 INJECTION, SOLUTION INTRAVENOUS; SUBCUTANEOUS at 07:38

## 2018-03-19 RX ADMIN — BISACODYL 10 MG: 10 SUPPOSITORY RECTAL at 17:29

## 2018-03-19 RX ADMIN — INSULIN GLARGINE 35 UNITS: 100 INJECTION, SOLUTION SUBCUTANEOUS at 13:03

## 2018-03-19 RX ADMIN — METOPROLOL TARTRATE 100 MG: 50 TABLET ORAL at 09:15

## 2018-03-19 RX ADMIN — INSULIN LISPRO 6 UNITS: 100 INJECTION, SOLUTION INTRAVENOUS; SUBCUTANEOUS at 17:12

## 2018-03-19 RX ADMIN — IPRATROPIUM BROMIDE AND ALBUTEROL SULFATE 3 ML: .5; 3 SOLUTION RESPIRATORY (INHALATION) at 01:44

## 2018-03-19 NOTE — DISCHARGE SUMMARY
Discharge Summary       PATIENT ID: Pamela Montalvo  MRN: [id number]   YOB: 1955    DATE OF ADMISSION: 3/15/2018  5:19 PM    DATE OF DISCHARGE: 3/19/2018  PRIMARY CARE PROVIDER: Amanda Donaldson MD     DISCHARGING PROVIDER: Jossy Milton MD    To contact this individual call [phone] and ask the  to page. If unavailable ask to be transferred the Adult Hospitalist Department. CONSULTATIONS: IP CONSULT TO HOSPITALIST  IP CONSULT TO PULMONOLOGY    ADMITTING DIAGNOSES & HOSPITAL COURSE:     Excerpt From H&P: A 76-year-old -American female with past medical history of type 2 diabetes mellitus, asthma, hypertension, coronary artery disease, peripheral arterial disease status post bilateral lower extremity vascular stents, presented to the emergency department from home with chief complaint of shortness of breath The patient reportedly had onset of shortness of breath, cough, nonproductive of sputum. Symptoms notably have been ongoing for the last few days without specific alleviating factors associated with wheezing. The patient notes she has a history of asthma, however, had not required use of bronchodilators in the recent past.  She also complained of having a fever. Course: Patient was admitted to be treated as an Asthma exacerbation, but likely has COPD given years of smoking. Furthermore, her flu was + suggestive this viral illness cause the exacerbation. Pulmonology saw the patient and recommended outpatient follow-up in COPD clinic. Patient will need formal PFTs once her acute infection resolves. Patient to complete course of antibiotics, steroids, oseltamavir, as below. Patient was given a nebulizer machine on discharge.     DISCHARGE DIAGNOSES / PLAN:      SIRS(POA) - Related to Influenza A  Fever and tachycardia on admission that has now resolved  Respiratory PCR came back on 3/17 with flu  To continue course of Tamiflu     Acute Asthma Exacerbation with Bronchitis and Likely COPD (needs formal PFTs)  Course of prednisone and antibiotics with improvement (to continue, as below)  Patient to continue Singulair, and start Advair as an outpatient  Patient given nebulizer machine on discharge  Levaquin and Zosyn initially andnow Augmentin for 5 days total + Tamiflu  Smoking cessation counseling. Will also give RX for nicotine patch  COPD clinic follow-up     Abnormal CT imaging  Chest CT:1. Scattered groundglass opacities in the lungs more likely related to  underexpansion/atelectasis rather than airspace disease. 2. Several small pulmonary nodules that appear unchanged and likely incidental.  3. Increased mildly enlarged subcarinal lymph node which may be reactive  Discussed with Dr. Naila Willoughby from Pulmonology, will need repeat imaging in 3 months      Type 2 DM  Hgb A1C 8.5  To resume outpatient regimen     Intermittent Tachycardia  Likely due to frequent neb treatments    Diarrhea, resolved     Bilateral Leg Edema and PAD  Recent bilateral stents  Venous Duplex negative     Acute Hypoxic respiratory failure, resolved  ABG confirmed on 3/15  O2 sats > 98% on RA       CAD  ASA, Statin?, BB -> she should discuss statin with PCP given LDL  Denies chest pain  Echo with normal EF, but some diastolic dysfunction  Continue home lasix     Hypertension  Home meds     Hyperlipidemia  Would benefit from Statin -> needs F/U with PCP     Tobacco Abuse  Nicoderm patch     Obesity  Counseled     Debility  PT eval per pt request. Order for rollator       PENDING TEST RESULTS:   At the time of discharge the following test results are still pending: None    FOLLOW UP APPOINTMENTS:    Follow-up Information     Follow up With Details Comments Contact Info    [address] to home visit by nurse-- will call you to arrange time of visit. [address] [facility] [address] [facility] [address] Ailin Avery MD Schedule an appointment as soon as possible for a visit in 1 week  [facility] & [address]  [address] [facility]      Liban Armenta MD Schedule an appointment as soon as possible for a visit in 1 week  [address] [place] [address]             ADDITIONAL CARE RECOMMENDATIONS:     DIET: Cardiac Diet    ACTIVITY: Activity as tolerated    DISCHARGE MEDICATIONS:  Current Discharge Medication List      START taking these medications    Details   albuterol-ipratropium (DUO-NEB) 2.5 mg-0.5 mg/3 ml nebu 3 mL by Nebulization route every six (6) hours as needed. Qty: 30 Nebule, Refills: 0      amoxicillin-clavulanate (AUGMENTIN) 875-125 mg per tablet Take 1 Tab by mouth every twelve (12) hours. Qty: 5 Tab, Refills: 0      montelukast (SINGULAIR) 10 mg tablet Take 1 Tab by mouth nightly. Qty: 30 Tab, Refills: 0      nicotine (NICODERM CQ) 14 mg/24 hr patch 1 Patch by TransDERmal route daily for 30 days. Qty: 30 Patch, Refills: 0      oseltamivir (TAMIFLU) 75 mg capsule Take 1 Cap by mouth every twelve (12) hours for 5 days. Indications: INFLUENZA  Qty: 10 Cap, Refills: 0      predniSONE (DELTASONE) 20 mg tablet Take 1 Tab by mouth daily (with breakfast). Qty: 2 Tab, Refills: 0      senna-docusate (PERICOLACE) 8.6-50 mg per tablet Take 1 Tab by mouth daily. Qty: 30 Tab, Refills: 0      fluticasone-salmeterol (ADVAIR) 250-50 mcg/dose diskus inhaler Take 1 Puff by inhalation two (2) times a day. Qty: 1 Inhaler, Refills: 0         CONTINUE these medications which have NOT CHANGED    Details   furosemide (LASIX) 20 mg tablet Take 20 mg by mouth daily. insulin NPH/insulin regular (NOVOLIN 70/30 U-100 INSULIN) 100 unit/mL (70-30) injection 50 Units by SubCUTAneous route two (2) times a day. baclofen (LIORESAL) 10 mg tablet Take 10 mg by mouth daily as needed.       amLODIPine (NORVASC) 5 mg tablet Take 5 mg by mouth daily. benazepril (LOTENSIN) 10 mg tablet Take 10 mg by mouth daily. aspirin (ASPIRIN) 325 mg tablet Take 325 mg by mouth daily. metoprolol (LOPRESSOR) 50 mg tablet Take 100 mg by mouth daily. NOTIFY YOUR PHYSICIAN FOR ANY OF THE FOLLOWING:   Fever over 101 degrees for 24 hours. Chest pain, shortness of breath, fever, chills, nausea, vomiting, diarrhea, change in mentation, falling, weakness, bleeding. Severe pain or pain not relieved by medications. Or, any other signs or symptoms that you may have questions about. DISPOSITION:    Home With:   OT  PT  HH  RN       Long term SNF/Inpatient Rehab    Independent/assisted living    Hospice    Other:       PATIENT CONDITION AT DISCHARGE:     Functional status    Poor     Deconditioned     Independent      Cognition     Lucid     Forgetful     Dementia      Catheters/lines (plus indication)    Victor     PICC     PEG     None      Code status     Full code     DNR      PHYSICAL EXAMINATION AT DISCHARGE:    Constitutional:  No acute distress, cooperative, pleasant    ENT:  Oral mucous moist, oropharynx benign. Neck supple   Resp:  mild expiratory wheeze. No crackles   CV:  Regular rhythm, normal rate    GI:  Obese, Soft, non distended    Musculoskeletal:  trace edema, warm, warm    Neurologic:  Moves all extremities.   AAOx3                                             Psych:  Good insight  Skin:   Good turgor, no rashes or ulcers  Eyes:  EOMI      CHRONIC MEDICAL DIAGNOSES:  Problem List as of 3/19/2018  Date Reviewed: 3/15/2018          Codes Class Noted - Resolved    Bilateral leg edema ICD-10-CM: R60.0  ICD-9-CM: 782.3  3/15/2018 - Present        Chest pain at rest ICD-10-CM: R07.9  ICD-9-CM: 786.50  2/21/2014 - Present        ASHD (arteriosclerotic heart disease) ICD-10-CM: I25.10  ICD-9-CM: 414.00  2/21/2014 - Present        Diabetes mellitus ([facility] ICD-10-CM: E11.9  ICD-9-CM: 250.00  2/21/2014 - Present        Hypertension, benign ICD-10-CM: I10  ICD-9-CM: 401.1  2/21/2014 - Present        Hypercholesteremia ICD-10-CM: E78.00  ICD-9-CM: 272.0  2/21/2014 - Present        ASO (arteriosclerosis obliterans) ICD-10-CM: I70.90  ICD-9-CM: 440.9  2/21/2014 - Present        RESOLVED: Fever ICD-10-CM: R50.9  ICD-9-CM: 780.60  3/15/2018 - 3/19/2018        * (Principal)RESOLVED: SIRS (systemic inflammatory response syndrome) ([facility] ICD-10-CM: R65.10  ICD-9-CM: 995.90  3/15/2018 - 3/19/2018        RESOLVED: Asthma exacerbation ICD-10-CM: J45.901  ICD-9-CM: 493.92  3/15/2018 - 3/19/2018              Greater than 30 minutes were spent with the patient on counseling and coordination of care    Signed:   Hamlet Hollins MD  3/19/2018  5:14 PM

## 2018-03-19 NOTE — PROGRESS NOTES
NUTRITION       Nutrition screening referral was triggered based on results obtained during nursing admission assessment. The patient's chart was reviewed and nutrition assessment is not indicated at this time. Plan to see patient for rescreen as indicated. Thank you.      Wt Readings from Last 10 Encounters:   03/19/18 91.5 kg (201 lb 11.5 oz)   09/04/17 82.6 kg (182 lb 3.2 oz)   02/20/14 78.9 kg (174 lb)   06/07/11 79.8 kg (176 lb)     Tom Marc, [address]

## 2018-03-19 NOTE — PROGRESS NOTES
Problem: Mobility Impaired (Adult and Pediatric)  Goal: *Acute Goals and Plan of Care (Insert Text)  Physical Therapy Goals  Initiated 3/18/2018    2. Patient will transfer from bed to chair and chair to bed with modified independence using the least restrictive device within 7 day(s). 4.  Patient will ambulate with modified independence for 150 feet with the least restrictive device within 7 day(s). 5.  Patient will ascend/descend 3 stairs with 1 handrail(s) with supervision/set-up within 7 day(s). physical Therapy TREATMENT  Patient: Adrienne Barlow (85 y.o. female)  Date: 3/19/2018  Diagnosis: SIRS (systemic inflammatory response syndrome) (HCC)  Fever  Asthma exacerbation  Bilateral leg edema SIRS (systemic inflammatory response syndrome) (HCC)       Precautions:    Chart, physical therapy assessment, plan of care and goals were reviewed. ASSESSMENT:  Patient received in bed and agreeable to therapy. Did report vomiting earlier this morning but wants to do therapy. Moving well with bed mobility. Assessed using a rollator walker vs no AD. Gait is more steady and less ataxic    With rollator and she did fatigue quickly with increased heart rate during activity. With seated rest still tachy but slightly less. Returned to room and left with all needs met. Feel she will benefit from a rollator walker for home/community and orders placed. Progression toward goals:  []    Improving appropriately and progressing toward goals  []    Improving slowly and progressing toward goals  []    Not making progress toward goals and plan of care will be adjusted     PLAN:  Patient continues to benefit from skilled intervention to address the above impairments. Continue treatment per established plan of care. Discharge Recommendations:  Home Health  Further Equipment Recommendations for Discharge:  rollator     SUBJECTIVE:   Patient stated Noelle Lin got sick earlier this morning.     OBJECTIVE DATA SUMMARY:   Critical Behavior:      alert; age appropriate        Functional Mobility Training:  Bed Mobility:     Supine to Sit: Modified independent  Sit to Supine: Modified independent           Transfers:  Sit to Stand: Modified independent  Stand to Sit: Modified independent                             Balance:  Sitting: Intact  Standing: Intact; With support  Standing - Static: Good  Standing - Dynamic : Good  Ambulation/Gait Training:  Distance (ft): 125 Feet (ft)  Assistive Device: Gait belt;Walker, rollator  Ambulation - Level of Assistance: Supervision                       Speed/Maryann: Pace decreased (<100 feet/min)  Step Length: Right shortened;Left shortened     Pain:  Pain Scale 1: Numeric (0 - 10)  Pain Intensity 1: 0            Activity Tolerance: Tachycardic 113-122  Please refer to the flowsheet for vital signs taken during this treatment.   After treatment:   []    Patient left in no apparent distress sitting up in chair  [x]    Patient left in no apparent distress in bed  [x]    Call bell left within reach  [x]    Nursing notified  []    Caregiver present  []    Bed alarm activated    COMMUNICATION/COLLABORATION:   The patients plan of care was discussed with: Registered Nurse and [address], PT   Time Calculation: 19 mins

## 2018-03-19 NOTE — DISCHARGE INSTRUCTIONS
Discharge Instructions       PATIENT ID: Damian Garcia  MRN: [id number]   YOB: 1955    DATE OF ADMISSION: 3/15/2018  5:19 PM    DATE OF DISCHARGE: 3/19/2018    PRIMARY CARE PROVIDER: Evelyn Latif MD     ATTENDING PHYSICIAN: Joaquin Nunez MD  DISCHARGING PROVIDER: Joaquin Nunez MD    To contact this individual call [phone] and ask the  to page. If unavailable ask to be transferred the Adult Hospitalist Department. DISCHARGE DIAGNOSES Flu, Asthma Exacerbation    CONSULTATIONS: IP CONSULT TO HOSPITALIST  IP CONSULT TO PULMONOLOGY    PENDING TEST RESULTS:   At the time of discharge the following test results are still pending: None    FOLLOW UP APPOINTMENTS:   Follow-up Information     Follow up With Details Comments [address] to home visit by nurse-- will call you to arrange time of visit. [address] [place] [address] [address] [facility] [address]  [phone]    Evelyn Latif MD Schedule an appointment as soon as possible for a visit in 1 week  [facility] & [address]      Vahe Restrepo MD Schedule an appointment as soon as possible for a visit in 1 week Pulmonology [address] [facility]  [phone]             ADDITIONAL CARE RECOMMENDATIONS: Please finish taking your full course of antibiotics and medicine for the flu. You have 2 more days of steroids. Please also follow-up with your primary care doctor within the next week. We have arranged for a nebulizer machine for you and will give you scripts for an inhaler. More information below. Please see the above doctors as recommended. YOU ALSO NEED A REPEAT CT SCAN IN 3 MONTHS FOR SOME ENLARGED NODES SEEN ON YOUR SCAN. DIET: Cardiac Diet    ACTIVITY: Activity as tolerated.  Please see information below about how to avoid spreading the flu. DISCHARGE MEDICATIONS:   See Medication Reconciliation Form    · It is important that you take the medication exactly as they are prescribed. · Keep your medication in the bottles provided by the pharmacist and keep a list of the medication names, dosages, and times to be taken in your wallet. · Do not take other medications without consulting your doctor. NOTIFY YOUR PHYSICIAN FOR ANY OF THE FOLLOWING:   Fever over 101 degrees for 24 hours. Chest pain, shortness of breath, fever, chills, nausea, vomiting, diarrhea, change in mentation, falling, weakness, bleeding. Severe pain or pain not relieved by medications. Or, any other signs or symptoms that you may have questions about. DISPOSITION:    Home With:   OT  PT  HH  RN       SNF/Inpatient Rehab/LTAC    Independent/assisted living    Hospice    Other:       Signed:   Herberth James MD  3/19/2018  5:03 PM     Influenza (Flu): Care Instructions  Your Care Instructions    Influenza (flu) is an infection in the lungs and breathing passages. It is caused by the influenza virus. There are different strains, or types, of the flu virus from year to year. Unlike the common cold, the flu comes on suddenly and the symptoms, such as a cough, congestion, fever, chills, fatigue, aches, and pains, are more severe. These symptoms may last up to 10 days. Although the flu can make you feel very sick, it usually doesn't cause serious health problems. Home treatment is usually all you need for flu symptoms. But your doctor may prescribe antiviral medicine to prevent other health problems, such as pneumonia, from developing. Older people and those who have a long-term health condition, such as lung disease, are most at risk for having pneumonia or other health problems. Follow-up care is a key part of your treatment and safety.  Be sure to make and go to all appointments, and call your doctor if you are having problems. It's also a good idea to know your test results and keep a list of the medicines you take. How can you care for yourself at home? · Get plenty of rest.  · Drink plenty of fluids, enough so that your urine is light yellow or clear like water. If you have kidney, heart, or liver disease and have to limit fluids, talk with your doctor before you increase the amount of fluids you drink. · Take an over-the-counter pain medicine if needed, such as acetaminophen (Tylenol), ibuprofen (Advil, Motrin), or naproxen (Aleve), to relieve fever, headache, and muscle aches. Read and follow all instructions on the label. No one younger than 20 should take aspirin. It has been linked to Reye syndrome, a serious illness. · Do not smoke. Smoking can make the flu worse. If you need help quitting, talk to your doctor about stop-smoking programs and medicines. These can increase your chances of quitting for good. · Breathe moist air from a hot shower or from a sink filled with hot water to help clear a stuffy nose. · Before you use cough and cold medicines, check the label. These medicines may not be safe for young children or for people with certain health problems. · If the skin around your nose and lips becomes sore, put some petroleum jelly on the area. · To ease coughing:  ¨ Drink fluids to soothe a scratchy throat. ¨ Suck on cough drops or plain hard candy. ¨ Take an over-the-counter cough medicine that contains dextromethorphan to help you get some sleep. Read and follow all instructions on the label. ¨ Raise your head at night with an extra pillow. This may help you rest if coughing keeps you awake. · Take any prescribed medicine exactly as directed. Call your doctor if you think you are having a problem with your medicine. To avoid spreading the flu  · Wash your hands regularly, and keep your hands away from your face.   · Stay home from school, work, and other public places until you are feeling better and your fever has been gone for at least 24 hours. The fever needs to have gone away on its own without the help of medicine. · Ask people living with you to talk to their doctors about preventing the flu. They may get antiviral medicine to keep from getting the flu from you. · To prevent the flu in the future, get a flu vaccine every fall. Encourage people living with you to get the vaccine. · Cover your mouth when you cough or sneeze. When should you call for help? Call 911 anytime you think you may need emergency care. For example, call if:  ? · You have severe trouble breathing. ?Call your doctor now or seek immediate medical care if:  ? · You have new or worse trouble breathing. ? · You seem to be getting much sicker. ? · You feel very sleepy or confused. ? · You have a new or higher fever. ? · You get a new rash. ? Watch closely for changes in your health, and be sure to contact your doctor if:  ? · You begin to get better and then get worse. ? · You are not getting better after 1 week. Where can you learn more? Go to http://percyPaybookjeremie.info/. Enter D024 in the search box to learn more about \"Influenza (Flu): Care Instructions. \"  Current as of: May 12, 2017  Content Version: 11.4  © 9252-4864 [employer]. Care instructions adapted under license by [employer] (which disclaims liability or warranty for this information). If you have questions about a medical condition or this instruction, always ask your healthcare professional. [place] [address] any warranty or liability for your use of this information.       COPD and Asthma: Care Instructions  Your Care Instructions    Some people who have chronic obstructive pulmonary disease (COPD) also have asthma. Both of these problems can damage your lungs. This makes it very important to control them. Asthma causes the airways that lead to the lungs to swell and become narrow.  This makes it hard to breathe. You may wheeze or cough. If you have a bad attack, you may need emergency care. There are two parts to treating asthma. · Controlling asthma over the long term. · Treating attacks when they occur. You and your doctor can make an asthma treatment plan that will help. This plan tells you the medicines you take every day to reduce the swelling in your airways and prevent attacks. It also tells you what to do if you have an asthma attack. Follow-up care is a key part of your treatment and safety. Be sure to make and go to all appointments, and call your doctor if you are having problems. It's also a good idea to know your test results and keep a list of the medicines you take. How can you care for yourself at home? To control asthma over the long term  Medicines  Controller medicines reduce swelling in your lungs. They also prevent asthma attacks. Take your controller medicine exactly as prescribed. Talk to your doctor if you have any problems with your medicine. · Inhaled corticosteroid is a common and effective controller medicine. Using it the right way can prevent or reduce most side effects. · Take your controller medicine every day, not just when you have symptoms. This helps prevent problems before they occur. · Always bring your asthma medicine with you when you travel. · Your doctor may prescribe long-acting medicine that combines a corticosteroid with a beta2-agonist. Follow your doctor's instructions exactly about how to take a long-acting medicine. Examples include:  ¨ Fluticasone and salmeterol (Advair). ¨ Budesonide and formoterol (Symbicort). · Do not depend on your controller medicines to stop an asthma attack that has already started. They do not work fast enough to help. · Your doctor may also prescribe anticholinergic inhalers. These include ipratropium (Atrovent) and tiotropium (Spiriva). Education  · Learn what sets off an asthma attack.  Avoid these triggers when you can. Common triggers include smoke, air pollution, pollen, animal dander, colds, stress, and cold air. · Do not smoke. Smoking can make COPD and asthma worse. If you need help quitting, talk to your doctor about stop-smoking programs and medicines. These can increase your chances of quitting for good. · Check yourself for symptoms to know which step to follow in your action plan. Watch for things like being short of breath, having chest tightness, coughing, and wheezing. Also notice if symptoms wake you up at night or if you get tired quickly when you exercise. · You may want to learn how to use a peak flow meter. This measures how open your airways are. It may help you know when you will have an asthma attack. To treat attacks when they occur  Use your asthma action plan when you have an attack. Your quick-relief medicine, such as albuterol, will stop an asthma attack. It relaxes the muscles that get tight around the airways. · Take your quick-relief medicine exactly as prescribed. Talk with your doctor if you have any problems with your medicine. · Keep this medicine with you at all times. · You may need to use this medicine before you exercise. If your doctor prescribed corticosteroid pills to use during an attack, take them as directed. They may take hours to work, but they may shorten the attack and help you breathe better. When should you call for help? Call 911 anytime you think you may need emergency care. For example, call if:  ? · You have severe trouble breathing. ?Call your doctor now or seek immediate medical care if:  ? · You have new or worse shortness of breath. ? · You are coughing more deeply or more often, especially if you notice more mucus or a change in the color of your mucus. ? · You cough up blood. ? · You have new or increased swelling in your legs or belly. ? · You have a fever. ? · You have used your quick-relief medicine but you are still short of breath. ?Watch closely for changes in your health, and be sure to contact your doctor if you have any problems. Where can you learn more? Go to http://percy-jeremie.info/. Enter A350 in the search box to learn more about \"COPD and Asthma: Care Instructions. \"  Current as of: May 12, 2017  Content Version: 11.4  © 1219-0286 [employer]. Care instructions adapted under license by [employer] (which disclaims liability or warranty for this information). If you have questions about a medical condition or this instruction, always ask your healthcare professional. [address] any warranty or liability for your use of this information.

## 2018-03-19 NOTE — DIABETES MGMT
DTC Progress Note    Recommendations/ Comments: Chart reviewed due to hyperglycemia. Pt has required 20 unit of correction over the last 24 hours. Po intake  %. If appropriate, please consider:  1. Increasing Lantus to 35 units BID  2. Increasing meal time insulin to 8 units ac tid. Current hospital DM medication: Lantus 32 units BID, Humalog 6 units ac tid and correction scale Humalog, resistant scale. Chart reviewed on Jacki Romero. Patient is a 60 y.o. female with known diabetes on Novolin 70/30 50 units BID at home at home. A1c:   Lab Results   Component Value Date/Time    Hemoglobin A1c 8.5 (H) 03/16/2018 04:25 AM       Recent Glucose Results: Lab Results   Component Value Date/Time    GLUCPOC 170 (H) 03/19/2018 07:13 AM    GLUCPOC 179 (H) 03/18/2018 09:35 PM    GLUCPOC 251 (H) 03/18/2018 05:11 PM        Lab Results   Component Value Date/Time    Creatinine 0.99 03/18/2018 03:33 AM     Estimated Creatinine Clearance: 66.3 mL/min (based on Cr of 0.99). Active Orders   Diet    DIET DIABETIC CONSISTENT CARB Regular        PO intake: Patient Vitals for the past 72 hrs:   % Diet Eaten   03/18/18 1757 80 %   03/18/18 1438 100 %   03/18/18 0933 75 %   03/17/18 1758 75 %   03/17/18 1309 50 %   03/17/18 0902 25 %       Will continue to follow as needed.     Thank you  Rashel Mayer RD, CDE

## 2018-03-19 NOTE — PROGRESS NOTES
Patient did not receive 32 units Lantus this morning due to order being discontinued and new order of 35 units Lantus BID starting at 1800. Spoke with pharmacist and was told to order one time dose of 35 units Lantus due now to cover patient's AM dose.

## 2018-03-19 NOTE — PROGRESS NOTES
Problem: Discharge Planning  Goal: *Discharge to safe environment  Outcome: Progressing Towards Goal  Home with family support and [facility]

## 2018-03-19 NOTE — PROGRESS NOTES
CM reviewed chart and noted that patient came to ED due to SOB, diarrhea and leg swelling. Patient admitted with SIRS. Patient has medical hx including diabetes, asthma, hypertension, CAD, COPD and peripheral arterial diease. Right lower extremity arterial stent placement 1/18 at [facility]. PCP is Dr Alessandro Mojica. Last OV was 3 weeks ago. PCP is part of [facility]. No AMD in chart. CM met with patient in her room to introduce self and explain role. Patient was alert and oriented. Confirmed the above and that she lives at home --one level alone but her family is supportive and either daughter, Sandra Tolbert [phone], or son Cynthia Chairez [phone] or patient's sister, Helane Jeans will be staying with patient when discharged. Patient has another son in the [place] area who is supportive. Patient was self care and independent prior to admission-- No dme. Continues to drive. Daughter assist with transportation to appointments if needed. Patient worked for [employer] for 23 years-- is now disabled and recieves SSDI. Has [employer] from her former employer. Order for [facility] /PT received. Patient had no preference of agency-- referral sent to [employer] via [facility]. Awaiting response. Order nebulizer received-- Outpatient Pharmacy has ers-- IBAN nebulnicole faxed the order and demographics to the pharmacy and awaiting approval.   Therapy is trying to secure rollator for patient through insurance. Daughter will transport patient home when discharged. UPDATE 10:30 am  Patient accepted by 430 ESTmob for [facility] visit and will then provide PT. Patient will be seen by Wednesday (3/21/18) by nurse. . Name an number of agency on discharge instructions. Nurse will call to arrange time of visit. The outpatient pharmacy is not approved for supplying the nebulizer for patient. . The cost is $25.53.   CM sent referral to DME company that is in network with Vivian--Susie via [employer]  and spoke with Nahomi Martinez in the [place] office [phone] and  said the nebulizer can be delivered to patient's room today  if approved. CM to follow    UPDATE 12:15 pm  Nebulizer approved-- It will be delivered to patient before 5 pm today to her room 617. Care Management Interventions  PCP Verified by CM: Yes (3 weeks ago had OV)  Mode of Transport at Discharge:  (car with daughter)  Transition of Care Consult (CM Consult): [address]: Yes  Discharge Durable Medical Equipment: Yes (nebulizer)  Physical Therapy Consult: Yes  Occupational Therapy Consult: Yes  Current Support Network: Own Home (lives in one level home alone. Good fasmily support. . A family member will staywith patient when discharged.   No AMD )  Confirm Follow Up Transport: Family (self)  Plan discussed with Pt/Family/Caregiver: Yes  Freedom of Choice Offered: Yes  Discharge Location  Discharge Placement: Home with home health

## 2018-03-19 NOTE — PROGRESS NOTES
Bedside and Verbal shift change report given to Rajinder Joe (oncoming nurse) by Antonia Ford (offgoing nurse). Report included the following information SBAR.

## 2018-03-20 LAB
BACTERIA SPEC CULT: NORMAL
SERVICE CMNT-IMP: NORMAL

## 2018-03-22 ENCOUNTER — HOME CARE VISIT (OUTPATIENT)
Dept: HOME HEALTH SERVICES | Facility: HOME HEALTH | Age: 63
End: 2018-03-22

## 2018-03-23 ENCOUNTER — HOME CARE VISIT (OUTPATIENT)
Dept: HOME HEALTH SERVICES | Facility: HOME HEALTH | Age: 63
End: 2018-03-23

## 2018-03-23 LAB
A ALTERNATA IGE QN: <0.1 KU/L
A FUMIGATUS IGE QN: <0.1 KU/L
AMER ROACH IGE QN: <0.1 KU/L
BAHIA GRASS IGE QN: 3.13 KU/L
BERMUDA GRASS IGE QN: 6.71 KU/L
BOXELDER IGE QN: 3.75 KU/L
C HERBARUM IGE QN: <0.1 KU/L
CAT DANDER IGG QN: 1.19 KU/L
CLASS DESCRIPTION, 600268: ABNORMAL
COMMON RAGWEED IGE QN: 7.38 KU/L
D FARINAE IGE QN: 0.11 KU/L
D PTERONYSS IGE QN: 0.12 KU/L
DEPRECATED IGE QN: 16.6 KU/L
DOG DANDER IGE QN: 0.47 KU/L
ENGL PLANTAIN IGE QN: 0.73 KU/L
IGE SERPL-ACNC: 1462 IU/ML (ref 0–100)
JOHNSON GRASS IGE QN: 4.33 KU/L
KENT BLUE GRASS IGE QN: 1.82 KU/L
M RACEMOSUS IGE QN: 0.15 KU/L
MT JUNIPER IGE QN: 3.92 KU/L
NETTLE IGE QN: 5.89 KU/L
P NOTATUM IGE QN: <0.1 KU/L
S BOTRYOSUM IGE QN: 0.11 KU/L
WHITE BIRCH IGE QN: 2.14 KU/L
WHITE ELM IGG QN: 44.7 KU/L
WHITE HICKORY IGE QN: 6.06 KU/L
WHITE MULBERRY IGE QN: 3 KU/L
WHITE OAK IGE QN: 4.42 KU/L

## 2018-03-26 ENCOUNTER — HOME CARE VISIT (OUTPATIENT)
Dept: HOME HEALTH SERVICES | Facility: HOME HEALTH | Age: 63
End: 2018-03-26

## 2018-04-13 ENCOUNTER — HOSPITAL ENCOUNTER (OUTPATIENT)
Dept: CT IMAGING | Age: 63
Discharge: HOME | End: 2018-04-13
Attending: PHYSICIAN ASSISTANT

## 2018-04-13 DIAGNOSIS — R93.89 ABNORMAL COMPUTED TOMOGRAPHY SCAN: ICD-10-CM

## 2018-07-02 ENCOUNTER — APPOINTMENT (OUTPATIENT)
Dept: ULTRASOUND IMAGING | Age: 63
DRG: 190 | End: 2018-07-02
Attending: INTERNAL MEDICINE
Payer: COMMERCIAL

## 2018-07-02 ENCOUNTER — APPOINTMENT (OUTPATIENT)
Dept: CT IMAGING | Age: 63
DRG: 190 | End: 2018-07-02
Attending: INTERNAL MEDICINE
Payer: COMMERCIAL

## 2018-07-02 ENCOUNTER — HOSPITAL ENCOUNTER (INPATIENT)
Age: 63
LOS: 7 days | Discharge: HOME | DRG: 190 | End: 2018-07-09
Attending: EMERGENCY MEDICINE | Admitting: INTERNAL MEDICINE
Payer: COMMERCIAL

## 2018-07-02 ENCOUNTER — APPOINTMENT (OUTPATIENT)
Dept: GENERAL RADIOLOGY | Age: 63
DRG: 190 | End: 2018-07-02
Attending: EMERGENCY MEDICINE
Payer: COMMERCIAL

## 2018-07-02 DIAGNOSIS — I50.9 ACUTE ON CHRONIC CONGESTIVE HEART FAILURE, UNSPECIFIED HEART FAILURE TYPE (HCC): Primary | ICD-10-CM

## 2018-07-02 LAB
ALBUMIN SERPL-MCNC: 2.2 G/DL (ref 3.5–5)
ALBUMIN SERPL-MCNC: 2.3 G/DL (ref 3.5–5)
ALBUMIN/GLOB SERPL: 0.5 {RATIO} (ref 1.1–2.2)
ALBUMIN/GLOB SERPL: 0.5 {RATIO} (ref 1.1–2.2)
ALP SERPL-CCNC: 121 U/L (ref 45–117)
ALP SERPL-CCNC: 128 U/L (ref 45–117)
ALT SERPL-CCNC: 15 U/L (ref 12–78)
ALT SERPL-CCNC: 17 U/L (ref 12–78)
ANION GAP SERPL CALC-SCNC: 11 MMOL/L (ref 5–15)
ANION GAP SERPL CALC-SCNC: 12 MMOL/L (ref 5–15)
APPEARANCE UR: CLEAR
ARTERIAL PATENCY WRIST A: YES
AST SERPL-CCNC: 20 U/L (ref 15–37)
AST SERPL-CCNC: 39 U/L (ref 15–37)
ATRIAL RATE: 115 BPM
BACTERIA URNS QL MICRO: NEGATIVE /HPF
BASE EXCESS BLD CALC-SCNC: 1 MMOL/L
BASOPHILS # BLD: 0 K/UL (ref 0–0.1)
BASOPHILS # BLD: 0 K/UL (ref 0–0.1)
BASOPHILS NFR BLD: 0 % (ref 0–1)
BASOPHILS NFR BLD: 0 % (ref 0–1)
BDY SITE: ABNORMAL
BILIRUB SERPL-MCNC: 0.3 MG/DL (ref 0.2–1)
BILIRUB SERPL-MCNC: 0.4 MG/DL (ref 0.2–1)
BILIRUB UR QL: NEGATIVE
BNP SERPL-MCNC: 889 PG/ML (ref 0–125)
BUN SERPL-MCNC: 13 MG/DL (ref 6–20)
BUN SERPL-MCNC: 14 MG/DL (ref 6–20)
BUN/CREAT SERPL: 10 (ref 12–20)
BUN/CREAT SERPL: 9 (ref 12–20)
CALCIUM SERPL-MCNC: 7.5 MG/DL (ref 8.5–10.1)
CALCIUM SERPL-MCNC: 7.6 MG/DL (ref 8.5–10.1)
CALCULATED P AXIS, ECG09: 61 DEGREES
CALCULATED R AXIS, ECG10: -30 DEGREES
CALCULATED T AXIS, ECG11: 66 DEGREES
CHLORIDE SERPL-SCNC: 102 MMOL/L (ref 97–108)
CHLORIDE SERPL-SCNC: 102 MMOL/L (ref 97–108)
CHOLEST SERPL-MCNC: 260 MG/DL
CK MB CFR SERPL CALC: 0.5 % (ref 0–2.5)
CK MB CFR SERPL CALC: 0.8 % (ref 0–2.5)
CK MB SERPL-MCNC: 3.1 NG/ML (ref 5–25)
CK MB SERPL-MCNC: 5.2 NG/ML (ref 5–25)
CK SERPL-CCNC: 588 U/L (ref 26–192)
CK SERPL-CCNC: 684 U/L (ref 26–192)
CO2 SERPL-SCNC: 23 MMOL/L (ref 21–32)
CO2 SERPL-SCNC: 24 MMOL/L (ref 21–32)
COLOR UR: ABNORMAL
CREAT SERPL-MCNC: 1.37 MG/DL (ref 0.55–1.02)
CREAT SERPL-MCNC: 1.51 MG/DL (ref 0.55–1.02)
CRP SERPL-MCNC: 14.6 MG/DL (ref 0–0.6)
D DIMER PPP FEU-MCNC: 1.38 MG/L FEU (ref 0–0.65)
DIAGNOSIS, 93000: NORMAL
DIFFERENTIAL METHOD BLD: ABNORMAL
DIFFERENTIAL METHOD BLD: ABNORMAL
EOSINOPHIL # BLD: 0 K/UL (ref 0–0.4)
EOSINOPHIL # BLD: 0.1 K/UL (ref 0–0.4)
EOSINOPHIL NFR BLD: 0 % (ref 0–7)
EOSINOPHIL NFR BLD: 1 % (ref 0–7)
EPITH CASTS URNS QL MICRO: ABNORMAL /LPF
ERYTHROCYTE [DISTWIDTH] IN BLOOD BY AUTOMATED COUNT: 14.3 % (ref 11.5–14.5)
ERYTHROCYTE [DISTWIDTH] IN BLOOD BY AUTOMATED COUNT: 14.6 % (ref 11.5–14.5)
ERYTHROCYTE [SEDIMENTATION RATE] IN BLOOD: 109 MM/HR (ref 0–30)
EST. AVERAGE GLUCOSE BLD GHB EST-MCNC: 232 MG/DL
FERRITIN SERPL-MCNC: 612 NG/ML (ref 8–252)
FOLATE SERPL-MCNC: 7.3 NG/ML (ref 5–21)
GAS FLOW.O2 O2 DELIVERY SYS: ABNORMAL L/MIN
GAS FLOW.O2 SETTING OXYMISER: 2 L/M
GLOBULIN SER CALC-MCNC: 4.4 G/DL (ref 2–4)
GLOBULIN SER CALC-MCNC: 4.7 G/DL (ref 2–4)
GLUCOSE BLD STRIP.AUTO-MCNC: 350 MG/DL (ref 65–100)
GLUCOSE BLD STRIP.AUTO-MCNC: 384 MG/DL (ref 65–100)
GLUCOSE BLD STRIP.AUTO-MCNC: 387 MG/DL (ref 65–100)
GLUCOSE BLD STRIP.AUTO-MCNC: 472 MG/DL (ref 65–100)
GLUCOSE SERPL-MCNC: 271 MG/DL (ref 65–100)
GLUCOSE SERPL-MCNC: 307 MG/DL (ref 65–100)
GLUCOSE UR STRIP.AUTO-MCNC: >1000 MG/DL
HBA1C MFR BLD: 9.7 % (ref 4.2–6.3)
HCO3 BLD-SCNC: 24.4 MMOL/L (ref 22–26)
HCT VFR BLD AUTO: 32.6 % (ref 35–47)
HCT VFR BLD AUTO: 32.8 % (ref 35–47)
HDLC SERPL-MCNC: 31 MG/DL
HDLC SERPL: 8.4 {RATIO} (ref 0–5)
HGB BLD-MCNC: 10.7 G/DL (ref 11.5–16)
HGB BLD-MCNC: 10.9 G/DL (ref 11.5–16)
HGB UR QL STRIP: ABNORMAL
HYALINE CASTS URNS QL MICRO: ABNORMAL /LPF (ref 0–5)
IMM GRANULOCYTES # BLD: 0 K/UL (ref 0–0.04)
IMM GRANULOCYTES # BLD: 0 K/UL (ref 0–0.04)
IMM GRANULOCYTES NFR BLD AUTO: 0 % (ref 0–0.5)
IMM GRANULOCYTES NFR BLD AUTO: 1 % (ref 0–0.5)
IRON SATN MFR SERPL: 12 % (ref 20–50)
IRON SERPL-MCNC: 22 UG/DL (ref 35–150)
KETONES UR QL STRIP.AUTO: NEGATIVE MG/DL
LDLC SERPL CALC-MCNC: 197.2 MG/DL (ref 0–100)
LEUKOCYTE ESTERASE UR QL STRIP.AUTO: NEGATIVE
LIPID PROFILE,FLP: ABNORMAL
LYMPHOCYTES # BLD: 1 K/UL (ref 0.8–3.5)
LYMPHOCYTES # BLD: 1.1 K/UL (ref 0.8–3.5)
LYMPHOCYTES NFR BLD: 15 % (ref 12–49)
LYMPHOCYTES NFR BLD: 19 % (ref 12–49)
MAGNESIUM SERPL-MCNC: 1.7 MG/DL (ref 1.6–2.4)
MCH RBC QN AUTO: 31.7 PG (ref 26–34)
MCH RBC QN AUTO: 31.8 PG (ref 26–34)
MCHC RBC AUTO-ENTMCNC: 32.8 G/DL (ref 30–36.5)
MCHC RBC AUTO-ENTMCNC: 33.2 G/DL (ref 30–36.5)
MCV RBC AUTO: 95.3 FL (ref 80–99)
MCV RBC AUTO: 97 FL (ref 80–99)
MONOCYTES # BLD: 0.2 K/UL (ref 0–1)
MONOCYTES # BLD: 0.4 K/UL (ref 0–1)
MONOCYTES NFR BLD: 3 % (ref 5–13)
MONOCYTES NFR BLD: 7 % (ref 5–13)
NEUTS SEG # BLD: 3.6 K/UL (ref 1.8–8)
NEUTS SEG # BLD: 5.9 K/UL (ref 1.8–8)
NEUTS SEG NFR BLD: 71 % (ref 32–75)
NEUTS SEG NFR BLD: 81 % (ref 32–75)
NITRITE UR QL STRIP.AUTO: NEGATIVE
NRBC # BLD: 0 K/UL (ref 0–0.01)
NRBC # BLD: 0 K/UL (ref 0–0.01)
NRBC BLD-RTO: 0 PER 100 WBC
NRBC BLD-RTO: 0 PER 100 WBC
P-R INTERVAL, ECG05: 146 MS
PCO2 BLD: 34.3 MMHG (ref 35–45)
PH BLD: 7.46 [PH] (ref 7.35–7.45)
PH UR STRIP: 5 [PH] (ref 5–8)
PHOSPHATE SERPL-MCNC: 3.7 MG/DL (ref 2.6–4.7)
PLATELET # BLD AUTO: 236 K/UL (ref 150–400)
PLATELET # BLD AUTO: 243 K/UL (ref 150–400)
PMV BLD AUTO: 10 FL (ref 8.9–12.9)
PMV BLD AUTO: 9.6 FL (ref 8.9–12.9)
PO2 BLD: 68 MMHG (ref 80–100)
POTASSIUM SERPL-SCNC: 3.1 MMOL/L (ref 3.5–5.1)
POTASSIUM SERPL-SCNC: 3.7 MMOL/L (ref 3.5–5.1)
PROT SERPL-MCNC: 6.7 G/DL (ref 6.4–8.2)
PROT SERPL-MCNC: 6.9 G/DL (ref 6.4–8.2)
PROT UR STRIP-MCNC: 300 MG/DL
Q-T INTERVAL, ECG07: 350 MS
QRS DURATION, ECG06: 90 MS
QTC CALCULATION (BEZET), ECG08: 484 MS
RBC # BLD AUTO: 3.36 M/UL (ref 3.8–5.2)
RBC # BLD AUTO: 3.44 M/UL (ref 3.8–5.2)
RBC #/AREA URNS HPF: ABNORMAL /HPF (ref 0–5)
RHEUMATOID FACT SERPL-ACNC: <10 IU/ML
SAO2 % BLD: 94 % (ref 92–97)
SERVICE CMNT-IMP: ABNORMAL
SODIUM SERPL-SCNC: 137 MMOL/L (ref 136–145)
SODIUM SERPL-SCNC: 137 MMOL/L (ref 136–145)
SP GR UR REFRACTOMETRY: 1.01 (ref 1–1.03)
SPECIMEN TYPE: ABNORMAL
TIBC SERPL-MCNC: 190 UG/DL (ref 250–450)
TRIGL SERPL-MCNC: 159 MG/DL (ref ?–150)
TROPONIN I SERPL-MCNC: <0.05 NG/ML
TSH SERPL DL<=0.05 MIU/L-ACNC: 1.31 UIU/ML (ref 0.36–3.74)
UROBILINOGEN UR QL STRIP.AUTO: 0.2 EU/DL (ref 0.2–1)
VENTRICULAR RATE, ECG03: 115 BPM
VIT B12 SERPL-MCNC: 446 PG/ML (ref 193–986)
VLDLC SERPL CALC-MCNC: 31.8 MG/DL
WBC # BLD AUTO: 5 K/UL (ref 3.6–11)
WBC # BLD AUTO: 7.3 K/UL (ref 3.6–11)
WBC URNS QL MICRO: ABNORMAL /HPF (ref 0–4)

## 2018-07-02 PROCEDURE — 80053 COMPREHEN METABOLIC PANEL: CPT | Performed by: EMERGENCY MEDICINE

## 2018-07-02 PROCEDURE — 94761 N-INVAS EAR/PLS OXIMETRY MLT: CPT

## 2018-07-02 PROCEDURE — 77030029684 HC NEB SM VOL KT MONA -A

## 2018-07-02 PROCEDURE — 83540 ASSAY OF IRON: CPT | Performed by: INTERNAL MEDICINE

## 2018-07-02 PROCEDURE — 84100 ASSAY OF PHOSPHORUS: CPT | Performed by: INTERNAL MEDICINE

## 2018-07-02 PROCEDURE — 76770 US EXAM ABDO BACK WALL COMP: CPT

## 2018-07-02 PROCEDURE — 94762 N-INVAS EAR/PLS OXIMTRY CONT: CPT

## 2018-07-02 PROCEDURE — 74011250637 HC RX REV CODE- 250/637: Performed by: EMERGENCY MEDICINE

## 2018-07-02 PROCEDURE — 80053 COMPREHEN METABOLIC PANEL: CPT | Performed by: INTERNAL MEDICINE

## 2018-07-02 PROCEDURE — 93970 EXTREMITY STUDY: CPT

## 2018-07-02 PROCEDURE — 99284 EMERGENCY DEPT VISIT MOD MDM: CPT

## 2018-07-02 PROCEDURE — 96375 TX/PRO/DX INJ NEW DRUG ADDON: CPT

## 2018-07-02 PROCEDURE — 77010033678 HC OXYGEN DAILY

## 2018-07-02 PROCEDURE — 74011250636 HC RX REV CODE- 250/636: Performed by: INTERNAL MEDICINE

## 2018-07-02 PROCEDURE — 81001 URINALYSIS AUTO W/SCOPE: CPT | Performed by: INTERNAL MEDICINE

## 2018-07-02 PROCEDURE — 93005 ELECTROCARDIOGRAM TRACING: CPT

## 2018-07-02 PROCEDURE — 85379 FIBRIN DEGRADATION QUANT: CPT | Performed by: INTERNAL MEDICINE

## 2018-07-02 PROCEDURE — 36415 COLL VENOUS BLD VENIPUNCTURE: CPT | Performed by: EMERGENCY MEDICINE

## 2018-07-02 PROCEDURE — 82962 GLUCOSE BLOOD TEST: CPT

## 2018-07-02 PROCEDURE — 94640 AIRWAY INHALATION TREATMENT: CPT

## 2018-07-02 PROCEDURE — 85652 RBC SED RATE AUTOMATED: CPT | Performed by: INTERNAL MEDICINE

## 2018-07-02 PROCEDURE — 71046 X-RAY EXAM CHEST 2 VIEWS: CPT

## 2018-07-02 PROCEDURE — 82746 ASSAY OF FOLIC ACID SERUM: CPT | Performed by: INTERNAL MEDICINE

## 2018-07-02 PROCEDURE — 80061 LIPID PANEL: CPT | Performed by: INTERNAL MEDICINE

## 2018-07-02 PROCEDURE — 84484 ASSAY OF TROPONIN QUANT: CPT | Performed by: EMERGENCY MEDICINE

## 2018-07-02 PROCEDURE — 82728 ASSAY OF FERRITIN: CPT | Performed by: INTERNAL MEDICINE

## 2018-07-02 PROCEDURE — 36600 WITHDRAWAL OF ARTERIAL BLOOD: CPT

## 2018-07-02 PROCEDURE — 83880 ASSAY OF NATRIURETIC PEPTIDE: CPT | Performed by: EMERGENCY MEDICINE

## 2018-07-02 PROCEDURE — 74011000250 HC RX REV CODE- 250: Performed by: EMERGENCY MEDICINE

## 2018-07-02 PROCEDURE — 82607 VITAMIN B-12: CPT | Performed by: INTERNAL MEDICINE

## 2018-07-02 PROCEDURE — 74011250637 HC RX REV CODE- 250/637: Performed by: INTERNAL MEDICINE

## 2018-07-02 PROCEDURE — 83735 ASSAY OF MAGNESIUM: CPT | Performed by: INTERNAL MEDICINE

## 2018-07-02 PROCEDURE — 86140 C-REACTIVE PROTEIN: CPT | Performed by: INTERNAL MEDICINE

## 2018-07-02 PROCEDURE — 74011250636 HC RX REV CODE- 250/636: Performed by: EMERGENCY MEDICINE

## 2018-07-02 PROCEDURE — 86331 IMMUNODIFFUSION OUCHTERLONY: CPT | Performed by: INTERNAL MEDICINE

## 2018-07-02 PROCEDURE — 74011636637 HC RX REV CODE- 636/637: Performed by: INTERNAL MEDICINE

## 2018-07-02 PROCEDURE — 86038 ANTINUCLEAR ANTIBODIES: CPT | Performed by: INTERNAL MEDICINE

## 2018-07-02 PROCEDURE — 74011000250 HC RX REV CODE- 250: Performed by: INTERNAL MEDICINE

## 2018-07-02 PROCEDURE — 82164 ANGIOTENSIN I ENZYME TEST: CPT | Performed by: INTERNAL MEDICINE

## 2018-07-02 PROCEDURE — 83036 HEMOGLOBIN GLYCOSYLATED A1C: CPT | Performed by: INTERNAL MEDICINE

## 2018-07-02 PROCEDURE — 96374 THER/PROPH/DIAG INJ IV PUSH: CPT

## 2018-07-02 PROCEDURE — 84443 ASSAY THYROID STIM HORMONE: CPT | Performed by: INTERNAL MEDICINE

## 2018-07-02 PROCEDURE — 85025 COMPLETE CBC W/AUTO DIFF WBC: CPT | Performed by: EMERGENCY MEDICINE

## 2018-07-02 PROCEDURE — 65660000000 HC RM CCU STEPDOWN

## 2018-07-02 PROCEDURE — 71250 CT THORAX DX C-: CPT

## 2018-07-02 PROCEDURE — 82803 BLOOD GASES ANY COMBINATION: CPT

## 2018-07-02 PROCEDURE — 82550 ASSAY OF CK (CPK): CPT | Performed by: INTERNAL MEDICINE

## 2018-07-02 PROCEDURE — 86431 RHEUMATOID FACTOR QUANT: CPT | Performed by: INTERNAL MEDICINE

## 2018-07-02 RX ORDER — ARFORMOTEROL TARTRATE 15 UG/2ML
15 SOLUTION RESPIRATORY (INHALATION)
Status: DISCONTINUED | OUTPATIENT
Start: 2018-07-02 | End: 2018-07-09 | Stop reason: HOSPADM

## 2018-07-02 RX ORDER — BUDESONIDE 0.5 MG/2ML
500 INHALANT ORAL
Status: DISCONTINUED | OUTPATIENT
Start: 2018-07-02 | End: 2018-07-09 | Stop reason: HOSPADM

## 2018-07-02 RX ORDER — INSULIN LISPRO 100 [IU]/ML
5 INJECTION, SOLUTION INTRAVENOUS; SUBCUTANEOUS ONCE
Status: COMPLETED | OUTPATIENT
Start: 2018-07-02 | End: 2018-07-02

## 2018-07-02 RX ORDER — PREDNISONE 20 MG/1
40 TABLET ORAL
Status: DISCONTINUED | OUTPATIENT
Start: 2018-07-02 | End: 2018-07-03

## 2018-07-02 RX ORDER — FUROSEMIDE 10 MG/ML
40 INJECTION INTRAMUSCULAR; INTRAVENOUS DAILY
Status: DISCONTINUED | OUTPATIENT
Start: 2018-07-02 | End: 2018-07-07

## 2018-07-02 RX ORDER — METOPROLOL SUCCINATE 100 MG/1
100 TABLET, EXTENDED RELEASE ORAL DAILY
COMMUNITY
End: 2022-05-15

## 2018-07-02 RX ORDER — INSULIN GLARGINE 100 [IU]/ML
50 INJECTION, SOLUTION SUBCUTANEOUS
Status: DISCONTINUED | OUTPATIENT
Start: 2018-07-02 | End: 2018-07-06

## 2018-07-02 RX ORDER — LISINOPRIL 40 MG/1
40 TABLET ORAL DAILY
COMMUNITY
End: 2019-04-28

## 2018-07-02 RX ORDER — ONDANSETRON 2 MG/ML
4 INJECTION INTRAMUSCULAR; INTRAVENOUS
Status: DISCONTINUED | OUTPATIENT
Start: 2018-07-02 | End: 2018-07-09 | Stop reason: HOSPADM

## 2018-07-02 RX ORDER — INSULIN LISPRO 100 [IU]/ML
INJECTION, SOLUTION INTRAVENOUS; SUBCUTANEOUS
Status: DISCONTINUED | OUTPATIENT
Start: 2018-07-02 | End: 2018-07-09 | Stop reason: HOSPADM

## 2018-07-02 RX ORDER — FLUTICASONE PROPIONATE AND SALMETEROL 250; 50 UG/1; UG/1
1 POWDER RESPIRATORY (INHALATION)
COMMUNITY
End: 2022-01-12

## 2018-07-02 RX ORDER — BACLOFEN 10 MG/1
10 TABLET ORAL
Status: DISCONTINUED | OUTPATIENT
Start: 2018-07-02 | End: 2018-07-02

## 2018-07-02 RX ORDER — INSULIN GLARGINE 100 [IU]/ML
60 INJECTION, SOLUTION SUBCUTANEOUS EVERY 24 HOURS
Status: DISCONTINUED | OUTPATIENT
Start: 2018-07-02 | End: 2018-07-02

## 2018-07-02 RX ORDER — FLUTICASONE PROPIONATE AND SALMETEROL 250; 50 UG/1; UG/1
1 POWDER RESPIRATORY (INHALATION) 2 TIMES DAILY
Status: DISCONTINUED | OUTPATIENT
Start: 2018-07-02 | End: 2018-07-02 | Stop reason: CLARIF

## 2018-07-02 RX ORDER — DEXTROSE 50 % IN WATER (D50W) INTRAVENOUS SYRINGE
12.5-25 AS NEEDED
Status: DISCONTINUED | OUTPATIENT
Start: 2018-07-02 | End: 2018-07-09 | Stop reason: HOSPADM

## 2018-07-02 RX ORDER — HEPARIN SODIUM 5000 [USP'U]/ML
5000 INJECTION, SOLUTION INTRAVENOUS; SUBCUTANEOUS EVERY 8 HOURS
Status: DISCONTINUED | OUTPATIENT
Start: 2018-07-02 | End: 2018-07-09 | Stop reason: HOSPADM

## 2018-07-02 RX ORDER — IPRATROPIUM BROMIDE AND ALBUTEROL SULFATE 2.5; .5 MG/3ML; MG/3ML
3 SOLUTION RESPIRATORY (INHALATION)
Status: DISCONTINUED | OUTPATIENT
Start: 2018-07-02 | End: 2018-07-03

## 2018-07-02 RX ORDER — SODIUM CHLORIDE 0.9 % (FLUSH) 0.9 %
5-10 SYRINGE (ML) INJECTION AS NEEDED
Status: DISCONTINUED | OUTPATIENT
Start: 2018-07-02 | End: 2018-07-09 | Stop reason: HOSPADM

## 2018-07-02 RX ORDER — POTASSIUM CHLORIDE 750 MG/1
20 TABLET, FILM COATED, EXTENDED RELEASE ORAL ONCE
Status: COMPLETED | OUTPATIENT
Start: 2018-07-02 | End: 2018-07-02

## 2018-07-02 RX ORDER — LEVOFLOXACIN 5 MG/ML
500 INJECTION, SOLUTION INTRAVENOUS EVERY 24 HOURS
Status: DISCONTINUED | OUTPATIENT
Start: 2018-07-02 | End: 2018-07-09 | Stop reason: HOSPADM

## 2018-07-02 RX ORDER — INSULIN GLARGINE 100 [IU]/ML
60 INJECTION, SOLUTION SUBCUTANEOUS DAILY
Status: DISCONTINUED | OUTPATIENT
Start: 2018-07-02 | End: 2018-07-06

## 2018-07-02 RX ORDER — MONTELUKAST SODIUM 10 MG/1
10 TABLET ORAL
Status: DISCONTINUED | OUTPATIENT
Start: 2018-07-02 | End: 2018-07-09 | Stop reason: HOSPADM

## 2018-07-02 RX ORDER — INSULIN LISPRO 100 [IU]/ML
12 INJECTION, SOLUTION INTRAVENOUS; SUBCUTANEOUS ONCE
Status: COMPLETED | OUTPATIENT
Start: 2018-07-02 | End: 2018-07-02

## 2018-07-02 RX ORDER — ROSUVASTATIN CALCIUM 10 MG/1
20 TABLET, COATED ORAL
Status: DISCONTINUED | OUTPATIENT
Start: 2018-07-02 | End: 2018-07-09 | Stop reason: HOSPADM

## 2018-07-02 RX ORDER — BISACODYL 5 MG
5 TABLET, DELAYED RELEASE (ENTERIC COATED) ORAL DAILY PRN
Status: DISCONTINUED | OUTPATIENT
Start: 2018-07-02 | End: 2018-07-09 | Stop reason: HOSPADM

## 2018-07-02 RX ORDER — AMLODIPINE BESYLATE 5 MG/1
5 TABLET ORAL DAILY
Status: DISCONTINUED | OUTPATIENT
Start: 2018-07-02 | End: 2018-07-02

## 2018-07-02 RX ORDER — MAGNESIUM SULFATE 100 %
4 CRYSTALS MISCELLANEOUS AS NEEDED
Status: DISCONTINUED | OUTPATIENT
Start: 2018-07-02 | End: 2018-07-09 | Stop reason: HOSPADM

## 2018-07-02 RX ORDER — POTASSIUM CHLORIDE 750 MG/1
40 TABLET, FILM COATED, EXTENDED RELEASE ORAL
Status: COMPLETED | OUTPATIENT
Start: 2018-07-02 | End: 2018-07-02

## 2018-07-02 RX ORDER — FUROSEMIDE 10 MG/ML
40 INJECTION INTRAMUSCULAR; INTRAVENOUS
Status: COMPLETED | OUTPATIENT
Start: 2018-07-02 | End: 2018-07-02

## 2018-07-02 RX ORDER — ACETAMINOPHEN 325 MG/1
650 TABLET ORAL
Status: DISCONTINUED | OUTPATIENT
Start: 2018-07-02 | End: 2018-07-09 | Stop reason: HOSPADM

## 2018-07-02 RX ORDER — INSULIN LISPRO 100 [IU]/ML
15 INJECTION, SOLUTION INTRAVENOUS; SUBCUTANEOUS ONCE
Status: COMPLETED | OUTPATIENT
Start: 2018-07-02 | End: 2018-07-02

## 2018-07-02 RX ORDER — BUMETANIDE 0.5 MG/1
0.5 TABLET ORAL 2 TIMES DAILY
COMMUNITY
End: 2019-04-28

## 2018-07-02 RX ORDER — METOPROLOL TARTRATE 50 MG/1
100 TABLET ORAL DAILY
Status: DISCONTINUED | OUTPATIENT
Start: 2018-07-02 | End: 2018-07-03 | Stop reason: ALTCHOICE

## 2018-07-02 RX ORDER — SODIUM CHLORIDE 0.9 % (FLUSH) 0.9 %
5-10 SYRINGE (ML) INJECTION EVERY 8 HOURS
Status: DISCONTINUED | OUTPATIENT
Start: 2018-07-02 | End: 2018-07-09 | Stop reason: HOSPADM

## 2018-07-02 RX ORDER — ASPIRIN 325 MG
325 TABLET ORAL DAILY
Status: DISCONTINUED | OUTPATIENT
Start: 2018-07-02 | End: 2018-07-09 | Stop reason: HOSPADM

## 2018-07-02 RX ORDER — SAME BUTANEDISULFONATE/BETAINE 400-600 MG
250 POWDER IN PACKET (EA) ORAL DAILY
Status: DISCONTINUED | OUTPATIENT
Start: 2018-07-02 | End: 2018-07-09 | Stop reason: HOSPADM

## 2018-07-02 RX ORDER — LISINOPRIL 20 MG/1
40 TABLET ORAL DAILY
Status: DISCONTINUED | OUTPATIENT
Start: 2018-07-02 | End: 2018-07-09 | Stop reason: HOSPADM

## 2018-07-02 RX ADMIN — RDII 250 MG CAPSULE 250 MG: at 08:05

## 2018-07-02 RX ADMIN — LEVOFLOXACIN 500 MG: 5 INJECTION, SOLUTION INTRAVENOUS at 06:18

## 2018-07-02 RX ADMIN — Medication 10 ML: at 22:27

## 2018-07-02 RX ADMIN — INSULIN LISPRO 5 UNITS: 100 INJECTION, SOLUTION INTRAVENOUS; SUBCUTANEOUS at 22:27

## 2018-07-02 RX ADMIN — METOPROLOL TARTRATE 100 MG: 50 TABLET ORAL at 08:05

## 2018-07-02 RX ADMIN — POTASSIUM CHLORIDE 20 MEQ: 750 TABLET, EXTENDED RELEASE ORAL at 06:23

## 2018-07-02 RX ADMIN — ACETAMINOPHEN 650 MG: 325 TABLET ORAL at 22:25

## 2018-07-02 RX ADMIN — INSULIN LISPRO 15 UNITS: 100 INJECTION, SOLUTION INTRAVENOUS; SUBCUTANEOUS at 12:13

## 2018-07-02 RX ADMIN — HEPARIN SODIUM 5000 UNITS: 5000 INJECTION INTRAVENOUS; SUBCUTANEOUS at 13:52

## 2018-07-02 RX ADMIN — ALBUTEROL SULFATE 1 DOSE: 2.5 SOLUTION RESPIRATORY (INHALATION) at 02:26

## 2018-07-02 RX ADMIN — ACETAMINOPHEN 650 MG: 325 TABLET ORAL at 08:59

## 2018-07-02 RX ADMIN — POTASSIUM CHLORIDE 40 MEQ: 750 TABLET, EXTENDED RELEASE ORAL at 02:04

## 2018-07-02 RX ADMIN — AMLODIPINE BESYLATE 5 MG: 5 TABLET ORAL at 08:05

## 2018-07-02 RX ADMIN — ARFORMOTEROL TARTRATE 15 MCG: 15 SOLUTION RESPIRATORY (INHALATION) at 08:54

## 2018-07-02 RX ADMIN — FUROSEMIDE 40 MG: 10 INJECTION, SOLUTION INTRAMUSCULAR; INTRAVENOUS at 08:47

## 2018-07-02 RX ADMIN — INSULIN LISPRO 15 UNITS: 100 INJECTION, SOLUTION INTRAVENOUS; SUBCUTANEOUS at 18:35

## 2018-07-02 RX ADMIN — ROSUVASTATIN CALCIUM 20 MG: 10 TABLET, FILM COATED ORAL at 22:25

## 2018-07-02 RX ADMIN — METHYLPREDNISOLONE SODIUM SUCCINATE 125 MG: 125 INJECTION, POWDER, FOR SOLUTION INTRAMUSCULAR; INTRAVENOUS at 01:44

## 2018-07-02 RX ADMIN — HEPARIN SODIUM 5000 UNITS: 5000 INJECTION INTRAVENOUS; SUBCUTANEOUS at 22:27

## 2018-07-02 RX ADMIN — INSULIN GLARGINE 60 UNITS: 100 INJECTION, SOLUTION SUBCUTANEOUS at 08:04

## 2018-07-02 RX ADMIN — MONTELUKAST SODIUM 10 MG: 10 TABLET, FILM COATED ORAL at 22:31

## 2018-07-02 RX ADMIN — FUROSEMIDE 40 MG: 10 INJECTION, SOLUTION INTRAMUSCULAR; INTRAVENOUS at 02:04

## 2018-07-02 RX ADMIN — INSULIN GLARGINE 50 UNITS: 100 INJECTION, SOLUTION SUBCUTANEOUS at 22:28

## 2018-07-02 RX ADMIN — LISINOPRIL 40 MG: 20 TABLET ORAL at 11:38

## 2018-07-02 RX ADMIN — ALBUTEROL SULFATE 1 DOSE: 2.5 SOLUTION RESPIRATORY (INHALATION) at 02:39

## 2018-07-02 RX ADMIN — PREDNISONE 40 MG: 20 TABLET ORAL at 08:05

## 2018-07-02 RX ADMIN — ALBUTEROL SULFATE 1 DOSE: 2.5 SOLUTION RESPIRATORY (INHALATION) at 03:08

## 2018-07-02 RX ADMIN — ARFORMOTEROL TARTRATE 15 MCG: 15 SOLUTION RESPIRATORY (INHALATION) at 21:29

## 2018-07-02 RX ADMIN — BUDESONIDE 500 MCG: 0.5 INHALANT RESPIRATORY (INHALATION) at 21:29

## 2018-07-02 RX ADMIN — HEPARIN SODIUM 5000 UNITS: 5000 INJECTION INTRAVENOUS; SUBCUTANEOUS at 06:24

## 2018-07-02 RX ADMIN — BUDESONIDE 500 MCG: 0.5 INHALANT RESPIRATORY (INHALATION) at 08:54

## 2018-07-02 RX ADMIN — INSULIN LISPRO 12 UNITS: 100 INJECTION, SOLUTION INTRAVENOUS; SUBCUTANEOUS at 07:39

## 2018-07-02 RX ADMIN — ASPIRIN 325 MG: 325 TABLET ORAL at 08:05

## 2018-07-02 NOTE — CARDIO/PULMONARY
Cardiac Rehab: Per EMR, patient is a current smoker.  Smoking Cessation Program information placed on the AVS.    Villa Mcardle, RN

## 2018-07-02 NOTE — ED TRIAGE NOTES
Triage note: Pt arrives ambulatory with c/o constant SOB x 2 days. Pt states she coughs up clear sputum and that she's experiencing chest tightness. Hx COPD and CHF.

## 2018-07-02 NOTE — IP AVS SNAPSHOT
1111 [facility] [address] [place] [address] 
[phone] Patient: Johan Lozoya MRN: [id number] About your hospitalization You were admitted on:  July 2, 2018 You last received care in the:  [facility] 2N MED SURG You were discharged on:  July 9, 2018 Why you were hospitalized Your primary diagnosis was:  Chf, Acute On Chronic (Hcc) Follow-up Information Follow up With Details Comments Contact Info Debo Craig NP On 7/13/2018 at 3:45 pm on 7/13/18 2809 [facility] [address] [place] [address] 
[phone] Delvis uMrry MD   3801 [facility] 
[phone] Patricio Cowan MD Schedule an appointment as soon as possible for a visit  3003 [facility] [address] [place] [address] 
[phone] Discharge Orders None A check bekah indicates which time of day the medication should be taken. My Medications START taking these medications Instructions Each Dose to Equal  
 Morning Noon Evening Bedtime NIFEdipine ER 60 mg ER tablet Commonly known as:  PROCARDIA XL Your last dose was: Your next dose is: Take 1 Tab by mouth daily. 60 mg  
    
   
   
   
  
 predniSONE 10 mg tablet Commonly known as:  Pb Razor Your last dose was: Your next dose is: Take two tablets for two days, 1 tablet for 3 days, and half a tablet for 3 days  
     
   
   
   
  
 rosuvastatin 20 mg tablet Commonly known as:  CRESTOR Your last dose was: Your next dose is: Take 1 Tab by mouth nightly. 20 mg CHANGE how you take these medications Instructions Each Dose to Equal  
 Morning Noon Evening Bedtime  
 insulin NPH/insulin regular 100 unit/mL (70-30) injection Commonly known as:  NovoLIN 70/30 U-100 Insulin What changed:  how much to take Your last dose was: Your next dose is:    
   
   
 45 Units by SubCUTAneous route two (2) times a day. 45 Units CONTINUE taking these medications Instructions Each Dose to Equal  
 Morning Noon Evening Bedtime ADVAIR DISKUS 250-50 mcg/dose diskus inhaler Generic drug:  fluticasone-salmeterol Your last dose was: Your next dose is: Take 1 Puff by inhalation two (2) times daily as needed. 1 Puff  
    
   
   
   
  
 albuterol-ipratropium 2.5 mg-0.5 mg/3 ml Nebu Commonly known as:  Everrett Kamila Your last dose was: Your next dose is:    
   
   
 3 mL by Nebulization route every six (6) hours as needed. 3 mL  
    
   
   
   
  
 aspirin 325 mg tablet Commonly known as:  ASPIRIN Your last dose was: Your next dose is: Take 325 mg by mouth daily. 325 mg  
    
   
   
   
  
 bumetanide 0.5 mg tablet Commonly known as:  Diana Harms Your last dose was: Your next dose is: Take 0.5 mg by mouth two (2) times a day. 0.5 mg  
    
   
   
   
  
 lisinopril 40 mg tablet Commonly known as:  Too Rivero Your last dose was: Your next dose is: Take 40 mg by mouth daily. 40 mg  
    
   
   
   
  
 montelukast 10 mg tablet Commonly known as:  SINGULAIR Your last dose was: Your next dose is: Take 1 Tab by mouth nightly. 10 mg  
    
   
   
   
  
 TOPROL  mg tablet Generic drug:  metoprolol succinate Your last dose was: Your next dose is: Take 100 mg by mouth daily. 100 mg  
    
   
   
   
  
  
STOP taking these medications   
 amLODIPine 5 mg tablet Commonly known as:  Liya Elizondo Where to Get Your Medications Information on where to get these meds will be given to you by the nurse or doctor. ! Ask your nurse or doctor about these medications insulin NPH/insulin regular 100 unit/mL (70-30) injection NIFEdipine ER 60 mg ER tablet  
 predniSONE 10 mg tablet  
 rosuvastatin 20 mg tablet Discharge Instructions Discharge Instructions PATIENT ID: Angelique Ocampo MRN: [id number] YOB: 1955 DATE OF ADMISSION: 7/2/2018 12:46 AM   
DATE OF DISCHARGE: 7/9/2018 PRIMARY CARE PROVIDER: Nabor Torrez MD  
 
ATTENDING PHYSICIAN: Billy Arellano MD 
DISCHARGING PROVIDER: Billy Arellano MD   
To contact this individual call [phone] and ask the  to page. If unavailable ask to be transferred the Adult Hospitalist Department. DISCHARGE DIAGNOSES COPD exacerbation CONSULTATIONS: IP CONSULT TO CARDIOLOGY 
IP CONSULT TO PULMONOLOGY PROCEDURES/SURGERIES: * No surgery found * PENDING TEST RESULTS:  
At the time of discharge the following test results are still pending: None FOLLOW UP APPOINTMENTS:  
Follow-up Information Follow up With Details Comments Contact Info Isabel Tinoco NP On 7/13/2018 at 3:45 pm on 7/13/18 2809 [facility] 350 [facility] 
[phone] Nabor Torrez MD   3801 [facility] 
[phone] Daksha Shaikh MD Schedule an appointment as soon as possible for a visit  3003 [facility] [address] 350 [facility] 
[phone] ADDITIONAL CARE RECOMMENDATIONS:  
 
You came in with shortness of breath that was related to your underlying lung disease. We treated you with antibiotics and steroids. You do not need any more antibiotics. Continue tapering your steroids (see prescription - 2 tabs for 2 more days,1 tablet for 3 days, half tablet for 3 days) Please follow-up with your PCP AND lung doctor. You need to be assessed for sleep apnea. YOU NO LONGER NEED the outpatient CT scan that was setup because we got one here. We changed one of your blood pressure medications and decreased your home insulin dose. See your PCP about further adjustments. DIET: Cardiac Diet and Diabetic Diet ACTIVITY: Activity as tolerated DISCHARGE MEDICATIONS: 
 See Medication Reconciliation Form · It is important that you take the medication exactly as they are prescribed. · Keep your medication in the bottles provided by the pharmacist and keep a list of the medication names, dosages, and times to be taken in your wallet. · Do not take other medications without consulting your doctor. NOTIFY YOUR PHYSICIAN FOR ANY OF THE FOLLOWING:  
Fever over 101 degrees for 24 hours. Chest pain, shortness of breath, fever, chills, nausea, vomiting, diarrhea, change in mentation, falling, weakness, bleeding. Severe pain or pain not relieved by medications. Or, any other signs or symptoms that you may have questions about. DISPOSITION: 
  Home With: 
 OT  PT  [place]  RN  
  
 SNF/Inpatient Rehab/LTAC Independent/assisted living Hospice Other:  
 
 
Signed:  
Jae Bejarano MD 
7/9/2018 
8:40 AM 
 
Smoking Cessation Program: This is a free, phone/text/email based, smoking cessation program. The program is individualized to meet each patient's needs. To enroll use the link [email or web address] or text Yosef Amaro to [phone] from any smart phone. NewGoTos Announcement We are excited to announce that we are making your provider's discharge notes available to you in NewGoTos. You will see these notes when they are completed and signed by the physician that discharged you from your recent hospital stay. If you have any questions or concerns about any information you see in NewGoTos, please call the Health Information Department where you were seen or reach out to your Primary Care Provider for more information about your plan of care. Introducing hospitals & HEALTH SERVICES! [employer] introduces Crowdcubet patient portal. Now you can access parts of your medical record, email your doctor's office, and request medication refills online. 1. In your internet browser, go to [email or web address] 2. Click on the First Time User? Click Here link in the Sign In box. You will see the New Member Sign Up page. 3. Enter your ProTenders Access Code exactly as it appears below. You will not need to use this code after youve completed the sign-up process. If you do not sign up before the expiration date, you must request a new code. · ProTenders Access Code: [email or web address] Expires: 9/25/2018  5:30 PM 
 
4. Enter the last four digits of your Social Security Number (xxxx) and Date of Birth (mm/dd/yyyy) as indicated and click Submit. You will be taken to the next sign-up page. 5. Create a ProTenders ID. This will be your ProTenders login ID and cannot be changed, so think of one that is secure and easy to remember. 6. Create a ProTenders password. You can change your password at any time. 7. Enter your Password Reset Question and Answer. This can be used at a later time if you forget your password. 8. Enter your e-mail address. You will receive e-mail notification when new information is available in [facility] 9. Click Sign Up. You can now view and download portions of your medical record. 10. Click the Download Summary menu link to download a portable copy of your medical information. If you have questions, please visit the Frequently Asked Questions section of the ProTenders website. Remember, ProTenders is NOT to be used for urgent needs. For medical emergencies, dial 911. Now available from your iPhone and Android! Introducing Sidney Marcano As a [employer] patient, I wanted to make you aware of our electronic visit tool called Sidney Marcano. [employer] 24/7 allows you to connect within minutes with a medical provider 24 hours a day, seven days a week via a mobile device or tablet or logging into a secure website from your computer. You can access eCert from anywhere in the [place]. A virtual visit might be right for you when you have a simple condition and feel like you just dont want to get out of bed, or cant get away from work for an appointment, when your regular [facility] provider is not available (evenings, weekends or holidays), or when youre out of town and need minor care. Electronic visits cost only $49 and if the [place] 24/7 provider determines a prescription is needed to treat your condition, one can be electronically transmitted to a nearby pharmacy*. Please take a moment to enroll today if you have not already done so. The enrollment process is free and takes just a few minutes. To enroll, please download the [employer]" carmencita to your tablet or phone, or visit [email or web address] to enroll on your computer. And, as an [address] [place] [address] patient with a [employer] account, the results of your visits will be scanned into your electronic medical record and your primary care provider will be able to view the scanned results. We urge you to continue to see your regular [facility] provider for your ongoing medical care. And while your primary care provider may not be the one available when you seek a Sidney Manzofin virtual visit, the peace of mind you get from getting a real diagnosis real time can be priceless. For more information on Secrettedemarcusfin, view our Frequently Asked Questions (FAQs) at [email or web address]. Sincerely, 
 
Gloria Hanley MD 
Chief Medical Officer Kasey8 Qing Lopez *:  certain medications cannot be prescribed via Secrettedemracusfin Providers Seen During Your Hospitalization Provider Specialty Primary office phone Brenton Parsons MD Emergency Medicine [phone] Barb Vo MD Internal Medicine [phone] Spenser Doshi MD Internal Medicine [phone] Jan Braga MD Hospitalist [phone] Your Primary Care Physician (PCP) Primary Care Physician Office Phone Office Fax William Bravo [phone] You are allergic to the following Allergen Reactions Ivp Dye (Fd And C Blue No.1) Hives Recent Documentation Height Weight BMI OB Status Smoking Status 1.626 m 94.8 kg 35.87 kg/m2 Hysterectomy Current Every Day Smoker Emergency Contacts Name Discharge Info Relation Home Work Mobile Mona Romero N/A  AT THIS TIME [6] Daughter [21] [phone] Dylan Burks CAREGIVER [3] Son [22] [phone] Patient Belongings The following personal items are in your possession at time of discharge: 
  Dental Appliances: None  Visual Aid: None      Home Medications: None   Jewelry: None  Clothing: At bedside    Other Valuables: None Discharge Instructions Attachments/References HEART FAILURE: AVOIDING TRIGGERS (ENGLISH) Patient Handouts Avoiding Triggers With Heart Failure: Care Instructions Your Care Instructions Triggers are anything that make your heart failure flare up. A flare-up is also called \"sudden heart failure\" or \"acute heart failure. \" When you have a flare-up, fluid builds up in your lungs, and you have problems breathing. You might need to go to the hospital. By watching for changes in your condition and avoiding triggers, you can prevent heart failure flare-ups. Follow-up care is a key part of your treatment and safety. Be sure to make and go to all appointments, and call your doctor if you are having problems. It's also a good idea to know your test results and keep a list of the medicines you take. How can you care for yourself at home? Watch for changes in your weight and condition · Weigh yourself without clothing at the same time each day. Record your weight. Call your doctor if you have sudden weight gain, such as more than 2 to 3 pounds in a day or 5 pounds in a week. (Your doctor may suggest a different range of weight gain.) A sudden weight gain may mean that your heart failure is getting worse. · Keep a daily record of your symptoms. Write down any changes in how you feel, such as new shortness of breath, cough, or problems eating. Also record if your ankles are more swollen than usual and if you feel more tired than usual. Note anything that you ate or did that could have triggered these changes. Limit sodium Sodium causes your body to hold on to extra water. This may cause your heart failure symptoms to get worse. People get most of their sodium from processed foods. Fast food and restaurant meals also tend to be very high in sodium. · Your doctor may suggest that you limit sodium to 2,000 milligrams (mg) a day or less. That is less than 1 teaspoon of salt a day, including all the salt you eat in cooking or in packaged foods. · Read food labels on cans and food packages. They tell you how much sodium you get in one serving. Check the serving size. If you eat more than one serving, you are getting more sodium. · Be aware that sodium can come in forms other than salt, including monosodium glutamate (MSG), sodium citrate, and sodium bicarbonate (baking soda). MSG is often added to Asian food. You can sometimes ask for food without MSG or salt. · Slowly reducing salt will help you adjust to the taste. Take the salt shaker off the table. · Flavor your food with garlic, lemon juice, onion, vinegar, herbs, and spices instead of salt. Do not use soy sauce, steak sauce, onion salt, garlic salt, mustard, or ketchup on your food, unless it is labeled \"low-sodium\" or \"low-salt. \" 
· Make your own salad dressings, sauces, and ketchup without adding salt. · Use fresh or frozen ingredients, instead of canned ones, whenever you can. Choose low-sodium canned goods. · Eat less processed food and food from restaurants, including fast food. Exercise as directed Moderate, regular exercise is very good for your heart. It improves your blood flow and helps control your weight. But too much exercise can stress your heart and cause a heart failure flare-up. · Check with your doctor before you start an exercise program. 
· Walking is an easy way to get exercise. Start out slowly. Gradually increase the length and pace of your walk. Swimming, riding a bike, and using a treadmill are also good forms of exercise. · When you exercise, watch for signs that your heart is working too hard. You are pushing yourself too hard if you cannot talk while you are exercising. If you become short of breath or dizzy or have chest pain, stop, sit down, and rest. 
· Do not exercise when you do not feel well. Take medicines correctly · Take your medicines exactly as prescribed. Call your doctor if you think you are having a problem with your medicine. · Make a list of all the medicines you take. Include those prescribed to you by other doctors and any over-the-counter medicines, vitamins, or supplements you take. Take this list with you when you go to any doctor. · Take your medicines at the same time every day. It may help you to post a list of all the medicines you take every day and what time of day you take them. · Make taking your medicine as simple as you can. Plan times to take your medicines when you are doing other things, such as eating a meal or getting ready for bed. This will make it easier to remember to take your medicines. · Get organized. Use helpful tools, such as daily or weekly pill containers. When should you call for help? Call 911 if you have symptoms of sudden heart failure such as: 
? · You have severe trouble breathing. ? · You cough up pink, foamy mucus. ? · You have a new irregular or rapid heartbeat. ?Call your doctor now or seek immediate medical care if: 
? · You have new or increased shortness of breath. ? · You are dizzy or lightheaded, or you feel like you may faint. ? · You have sudden weight gain, such as more than 2 to 3 pounds in a day or 5 pounds in a week. (Your doctor may suggest a different range of weight gain.) ? · You have increased swelling in your legs, ankles, or feet. ? · You are suddenly so tired or weak that you cannot do your usual activities. ? Watch closely for changes in your health, and be sure to contact your doctor if you develop new symptoms. Where can you learn more? Go to http://percy-jeremie.info/. Enter A335 in the search box to learn more about \"Avoiding Triggers With Heart Failure: Care Instructions. \" Current as of: September 21, 2016 Content Version: 11.4 © 6207-1237 [employer]. Care instructions adapted under license by [employer] (which disclaims liability or warranty for this information). If you have questions about a medical condition or this instruction, always ask your healthcare professional. [address] any warranty or liability for your use of this information. Please provide this summary of care documentation to your next provider. Signatures-by signing, you are acknowledging that this After Visit Summary has been reviewed with you and you have received a copy. Patient Signature:  ____________________________________________________________ Date:  ____________________________________________________________  
  
Aloma Snellen Provider Signature:  ____________________________________________________________ Date:  ____________________________________________________________

## 2018-07-02 NOTE — CONSULTS
Consult    Patient: Levi Gallegos MRN: [id number]  SSN: [id number]    YOB: 1955  Age: 60 y.o. Sex: female       Subjective:      Date of  Admission: 2018     Admission type: Emergency     CC: shortness of breath    Levi Gallegos is a 60 y.o. female admitted for CHF, acute on chronic ([facility] Ms Flaca Davis is a 60 diabetic female with a history of tobacco use, CAD and PAD as well as COPD, who presented with several days of worsening dyspnea. Subjective fevers and cough (non-productive) were also present. Tmax here so far around 99. She hasn't really noticed any significant change in leg swelling lately - they have been worse than they are currently. She is known to Dr Gala Posada and last had a cath by him in 2014, which showed her LCx stents (placed in Oct 2009) were patent and at that time there was no progression of disease and her EF was 60%. Her last office visit with Dr Gala Posada was in 2014 and she seems to have gotten lost to follow-up since then. She also has a h/o PAD with lower extremity stents in  done at [facility]. Primary Care Provider: Ana Rouse MD  Past Medical History:   Diagnosis Date    Asthma     CAD (coronary artery disease)     [facility]F (congestive heart failure) ([facility]     COPD (chronic obstructive pulmonary disease) ([facility]     [facility]     Hypertension       Past Surgical History:   Procedure Laterality Date    CARDIAC SURG PROCEDURE UNLIST      DELIVERY       HX CORONARY STENT PLACEMENT      HX HYSTERECTOMY      VASCULAR SURGERY PROCEDURE UNLIST      leg stent     History reviewed. No pertinent family history.    Social History   Substance Use Topics    Smoking status: Current Every Day Smoker     Packs/day: 0.25    Smokeless tobacco: Not on file    Alcohol use No      Current Facility-Administered Medications   Medication Dose Route Frequency    albuterol-ipratropium (DUO-NEB) 2.5 MG-0.5 MG/3 ML  3 mL Nebulization Q4H PRN    amLODIPine (NORVASC) tablet 5 mg  5 mg Oral DAILY    aspirin (ASPIRIN) tablet 325 mg  325 mg Oral DAILY    baclofen (LIORESAL) tablet 10 mg  10 mg Oral DAILY PRN    metoprolol tartrate (LOPRESSOR) tablet 100 mg  100 mg Oral DAILY    montelukast (SINGULAIR) tablet 10 mg  10 mg Oral QHS    sodium chloride (NS) flush 5-10 mL  5-10 mL IntraVENous Q8H    sodium chloride (NS) flush 5-10 mL  5-10 mL IntraVENous PRN    acetaminophen (TYLENOL) tablet 650 mg  650 mg Oral Q4H PRN    ondansetron (ZOFRAN) injection 4 mg  4 mg IntraVENous Q4H PRN    bisacodyl (DULCOLAX) tablet 5 mg  5 mg Oral DAILY PRN    heparin (porcine) injection 5,000 Units  5,000 Units SubCUTAneous Q8H    Saccharomyces boulardii (FLORASTOR) capsule 250 mg  250 mg Oral DAILY    levoFLOXacin (LEVAQUIN) 500 mg in D5W IVPB  500 mg IntraVENous Q24H    predniSONE (DELTASONE) tablet 40 mg  40 mg Oral DAILY WITH BREAKFAST    furosemide (LASIX) injection 40 mg  40 mg IntraVENous DAILY    insulin lispro (HUMALOG) injection   SubCUTAneous AC&HS    glucose chewable tablet 16 g  4 Tab Oral PRN    dextrose (D50W) injection syrg 12.5-25 g  12.5-25 g IntraVENous PRN    glucagon (GLUCAGEN) injection 1 mg  1 mg IntraMUSCular PRN    arformoterol (BROVANA) neb solution 15 mcg  15 mcg Nebulization BID RT    And    budesonide (PULMICORT) 500 mcg/2 ml nebulizer suspension  500 mcg Nebulization BID RT    insulin glargine (LANTUS) injection 60 Units  60 Units SubCUTAneous DAILY     Current Outpatient Prescriptions   Medication Sig    lisinopril (PRINIVIL, ZESTRIL) 40 mg tablet Take 40 mg by mouth daily.  metoprolol succinate (TOPROL XL) 100 mg tablet Take 100 mg by mouth daily.  bumetanide (BUMEX) 0.5 mg tablet Take 0.5 mg by mouth two (2) times a day.  albuterol-ipratropium (DUO-NEB) 2.5 mg-0.5 mg/3 ml nebu 3 mL by Nebulization route every six (6) hours as needed.  montelukast (SINGULAIR) 10 mg tablet Take 1 Tab by mouth nightly.     insulin NPH/insulin regular (NOVOLIN 70/30 U-100 INSULIN) 100 unit/mL (70-30) injection 70 Units by SubCUTAneous route two (2) times a day.  aspirin (ASPIRIN) 325 mg tablet Take 325 mg by mouth daily.  fluticasone-salmeterol (ADVAIR DISKUS) 250-50 mcg/dose diskus inhaler Take 1 Puff by inhalation two (2) times daily as needed.  amLODIPine (NORVASC) 5 mg tablet Take 5 mg by mouth daily. Allergies   Allergen Reactions    Ivp Dye [Fd And C Blue No.1] Hives        Review of Systems:  A comprehensive review of systems was negative except for that written in the History of Present Illness. Subjective:          Physical Exam:  Visit Vitals    /83 (BP 1 Location: Left arm, BP Patient Position: At rest)    Pulse (!) 111    Temp 98.4 °F (36.9 °C)    Resp 26    Ht 5' 4\" (1.626 m)    Wt 99.2 kg (218 lb 9.6 oz)    SpO2 98%    BMI 37.52 kg/m2     General Appearance:  Well developed, obese, alert and oriented x 3, and individual in no acute distress. Ears/Nose/Mouth/Throat:   Hearing grossly normal.         Neck: Supple. Chest:   No respiratory distress, moderate expiratory wheeze bilaterally   Cardiovascular:  Regular rate and rhythm, S1, S2 normal, no murmur. Abdomen:   Soft, non-tender, bowel sounds are active. Extremities: Mild edema bilaterally. Skin: Warm and dry.                Cardiographics:  Telemetry: sinus rhythm / sinus tachy  ECG: sinus tachycardia    Echocardiogram:   3/2018: EF 60%    Data Reviewed:   BMP:   Lab Results   Component Value Date/Time     07/02/2018 04:27 AM    K 3.7 07/02/2018 04:27 AM     07/02/2018 04:27 AM    CO2 24 07/02/2018 04:27 AM    AGAP 11 07/02/2018 04:27 AM     (H) 07/02/2018 04:27 AM    BUN 14 07/02/2018 04:27 AM    CREA 1.37 (H) 07/02/2018 04:27 AM    GFRAA 47 (L) 07/02/2018 04:27 AM    GFRNA 39 (L) 07/02/2018 04:27 AM     CBC:   Lab Results   Component Value Date/Time    WBC 7.3 07/02/2018 04:27 AM    HGB 10.9 (L) 07/02/2018 04:27 AM    HCT 32.8 (L) 07/02/2018 04:27 AM     07/02/2018 04:27 AM     All Cardiac Markers in the last 24 hours:   Lab Results   Component Value Date/Time     (H) 07/02/2018 04:27 AM    CKMB 3.1 07/02/2018 04:27 AM    CKNDX 0.5 07/02/2018 04:27 AM    TROIQ <0.05 07/02/2018 01:03 AM     Lab Results   Component Value Date/Time    Cholesterol, total 260 (H) 07/02/2018 04:27 AM    HDL Cholesterol 31 07/02/2018 04:27 AM    LDL, calculated 197.2 (H) 07/02/2018 04:27 AM    VLDL, calculated 31.8 07/02/2018 04:27 AM    Triglyceride 159 (H) 07/02/2018 04:27 AM    CHOL/HDL Ratio 8.4 (H) 07/02/2018 04:27 AM       NT-pro  (cutoff for her age = 26)    CXR: (reviewed personally)  Mild interstitial opacification bilaterally    CT Chest :  IMPRESSION:  Interval increase in groundglass opacities and nodular airspace disease in both  lungs, right greater than left, likely infectious or inflammatory in etiology. Follow-up is recommended to assure resolution. Unchanged subcarinal  lymphadenopathy.        Assessment:      1) Primary diagnosis is acute exacerbation of COPD, possibly with infectious component  Her exam is much more consistent with wheeze & COPD than CHF  BNP is equivocal for her age group  While CXR mentions some edema, CT Chest findings show more inflammatory / infectious ground-glass changes    2) Possible mild degree of acute on chronic diastolic CHF    3) BRENNAN  Cr up a bit, coming down on repeat sample    4) CAD - stable    5) Type II diabetes    6) HTN    7) Hyperlipidemia  Quite severe and not currently on a statin  Was once on pravastatin and doesn't know why it was stopped  No apparently allergies     Plan:     Reasonable to give her a dose of IV lasix to see if this helps, but I think the focus needs to be more on COPD with scheduled nebs, steroids and possibly antibiotics. Need to watch how Cr responds to this though.     She has been admitted to the Hospitalist service    From a cardiac perspective, I would start her on a potent statin for her dyslipidemia. Suggest Crestor 20mg daily. Will let Dr Nellie Clark know she is here.     Signed By: Amy Mancia MD     July 2, 2018

## 2018-07-02 NOTE — PROCEDURES
[facility]  *** FINAL REPORT ***    Name: Heather Vieyra  MRN: [id number]    Inpatient  : 06 Aug 1955  HIS Order #: [id number]  [address] [facility] Visit #: [id number]  Date: 2018    TYPE OF TEST: Peripheral Venous Testing    REASON FOR TEST  Limb swelling    Right Leg:-  Deep venous thrombosis:           No  Superficial venous thrombosis:    No  Deep venous insufficiency:        Not examined  Superficial venous insufficiency: Not examined    Left Leg:-  Deep venous thrombosis:           No  Superficial venous thrombosis:    No  Deep venous insufficiency:        Not examined  Superficial venous insufficiency: Not examined      INTERPRETATION/FINDINGS  PROCEDURE:  Color duplex ultrasound imaging of lower extremity veins. FINDINGS:       Right: The common femoral, deep femoral, femoral, popliteal,  posterior tibial, peroneal, and great saphenous are patent and without   evidence of thrombus;  each is fully compressible and there is no  narrowing of the flow channel on color Doppler imaging. Phasic flow  is observed in the common femoral vein. Left:   The common femoral, deep femoral, femoral, popliteal,  posterior tibial, peroneal, and great saphenous are patent and without   evidence of thrombus;  each is fully compressible and there is no  narrowing of the flow channel on color Doppler imaging. Phasic flow  is observed in the common femoral vein. IMPRESSION:  No evidence of right or left lower extremity vein  thrombosis. ADDITIONAL COMMENTS    I have personally reviewed the data relevant to the interpretation of  this  study.     TECHNOLOGIST: Austen Hernandez RVT  Signed: 2018 06:01 PM    PHYSICIAN: Lashanda Moore., MD  Signed: 2018 07:27 AM

## 2018-07-02 NOTE — PROGRESS NOTES
1145 - TRANSFER - IN REPORT:    Verbal report received from [facility] (name) on [employer]  being received from ED (unit) for routine progression of care      Report consisted of patients Situation, Background, Assessment and   Recommendations(SBAR). Information from the following report(s) SBAR, Kardex, ED Summary, Intake/Output, MAR, Recent Results and Med Rec Status was reviewed with the receiving nurse. Opportunity for questions and clarification was provided. Assessment completed upon patients arrival to unit and care assumed.      200 - Primary Nurse Tay Hanna and Mable Cushing, RN performed a dual skin assessment on this patient No impairment noted  Waldo score is 22

## 2018-07-02 NOTE — DIABETES MGMT
DTC Consult Note    Recommendations/ Comments: Consult received due to medication recommendations. Pt is on mixed insulin at home. Blood sugars are >200 likely due to steroids. Noted steroids are being weaned from IV to po. Pt is now on Prednisone 40 mg at breakfast. If appropriate, please consider:  1. Increasing Lantus to 70 units  2. Document po intake so needs for meal time insulin can be assessed. DTC to continue to follow and make additional recommendation as appropriate. Current hospital DM medication: Lantus 60 units and correction scale Humalog, normal sensitivity. Chart reviewed on Jacki Romero. Patient is a 60 y.o. female with known diabetes on 70/30 70 units BID at home. A1c:   Lab Results   Component Value Date/Time    Hemoglobin A1c 9.7 (H) 07/02/2018 04:27 AM    Hemoglobin A1c 8.5 (H) 03/16/2018 04:25 AM       Recent Glucose Results:   Lab Results   Component Value Date/Time     (H) 07/02/2018 04:27 AM     (H) 07/02/2018 01:03 AM    GLUCPOC 384 (H) 07/02/2018 07:15 AM        Lab Results   Component Value Date/Time    Creatinine 1.37 (H) 07/02/2018 04:27 AM     Estimated Creatinine Clearance: 48.1 mL/min (based on Cr of 1.37). Active Orders   Diet    DIET DIABETIC CONSISTENT CARB Regular        PO intake: No data found. Will continue to follow as needed.     Thank you  Jasbir Romano RD, CDE

## 2018-07-02 NOTE — CDMP QUERY
Documentation of Acute Renal Injury is noted in the progress notes. Currently the patient does not meet RIFLE criteria (BSV approved) to support this diagnosis. If you are using another criteria to support this diagnosis, please document this in your progress note. Otherwise, please document in the progress notes the clinical indicators that support this diagnosis or state that the diagnosis has been ruled out or clarified as acute renal insufficiency    RIFLE  (BSV Approved)    RISK:  Increased SCr x 1.5 or GFR decrease > 25% (within 7 days)    INJURY:  Increased SCr x 2.0 or GFR decreased > 50%    FAILURE:  Increased SCr x 3.0 or GFR decrease > 75% or SCr >4.0 mg/dL or acute increase >0.5 mg/dL    LOSS:  Persistent acute renal failure = complete loss of kidney function > 4 weeks    END STAGE:  End stage of kidney disease > 3 months      AKIN    STAGE  1:  Increase in SCr >/= 0.3 mg/dL or >/= 150% to 200% (1.5 to 2-fold) from baseline (within 48 hours)    STAGE  2:  Increase in SCr to more than 200% to 300% (>2-3 fold) from baseline    STAGE  3:  Increase in SCr to more than 300% (>3-fold) from baseline or SCr >/= 4.0 mg/dL with an acute increase of at least 0.5 mg/dL or initiation of renal replacement therapy      KDIGO    STAGE  1:  Increase in SCr by >/= 0.3 mg/dL within 48 hours or increase in SCr 1.5 to 1.9 times baseline which is known or presumed to have occurred within the prior 7 days    STAGE  2:  Increase in SCr to 2.0 to 2.9 times baseline    STAGE  3:  Increase in SCr to 3.0 times baseline or increase in SCr to >/= baseline or increase in SCr to >/= 4.0 mg/dL or initiation of renal replacement therapy      Please clarify and document your clinical opinion in the progress notes and discharge summary including the definitive and/or presumptive diagnosis, (suspected or probable), related to the above clinical findings.  Please include clinical findings supporting your diagnosis    Thank you,    Ning Kam Moralze, RN, BSN, [facility], [address] [facility]  [phone]

## 2018-07-02 NOTE — H&P
[address] [facility]  HISTORY AND PHYSICAL      Name:Latoya ALEJANDRO  MR#: [id number]  : 1955  ACCOUNT #: [de-identified]   ADMIT DATE: 2018    PRIMARY CARE PHYSICIAN:  Mable Hernandez MD     SOURCE OF INFORMATION:  The patient and the patient's daughter who was present at the bedside. CHIEF COMPLAINT:  Shortness of breath. HISTORY OF PRESENT ILLNESS:  This is a 58-year-old woman with a past medical history significant for congestive heart failure, recent diagnosis of COPD, hypertension, type 2 diabetes, coronary artery disease, status post stent placement, pulmonary nodules, dyslipidemia and suspected obstructive sleep apnea, was in her usual state of health until a few days ago, when the patient developed shortness of breath. The shortness of breath is progressive, associated with a fever and cough. The cough is productive of yellowish sputum. The shortness of breath is with little or no exertion. The fever is not associated with rigors and chills. The patient was last admitted to this hospital from 03/15/2018 through 2018. She was admitted with shortness of breath. The shortness of breath was attributed to suspected COPD. The patient was treated. During that hospitalization, a CT scan of the chest was obtained. The CT scan shows pulmonary nodules. The patient has since had a followup by the pulmonologist, in which the COPD was confirmed with PFTs. According to the patient's daughter, but it is not clear whether the patient underwent a sleep study to confirm obstructive sleep apnea that was suspected during that hospitalization. According to the patient, she is scheduled to have a repeat CT scan of the chest done next month to monitor her pulmonary nodules. She came to the emergency room because of the worsening shortness of breath. When the patient arrived at the emergency room, she received multiple breathing treatments without any significant relief.   She was referred to the hospitalist service for evaluation for admission. She also has elevated BNP level, and the chest x-ray shows mild pulmonary interstitial edema. The patient stated that she has a history of congestive heart failure. She has not seen a cardiologist in many years. She has had coronary artery disease with stent placement. PAST MEDICAL HISTORY:  Congestive heart failure, COPD, hypertension, type 2 diabetes, suspected obstructive sleep apnea, coronary artery disease status post stent placement, pulmonary nodules and dyslipidemia. ALLERGIES:  THE PATIENT IS ALLERGIC TO IV DYE. MEDICATIONS:  Albuterol nebulizer every 6 hours as needed, Norvasc 5 mg daily, aspirin 325 mg daily, baclofen 10 mg daily as needed, Lotensin 10 mg daily, Advair 250/50 inhalation twice daily, Lasix 20 mg daily, insulin 70/30 15 units subcutaneously twice daily, Lopressor 100 mg daily, Singular 10 mg daily at bedtime. FAMILY HISTORY:  This was reviewed and not pertinent to present illness. No family history of COPD. PAST SURGICAL HISTORY:  This is significant for cardiac stent placement, hysterectomy and  section. SOCIAL HISTORY:  According to the patient, she quit smoking 4 months ago when she was admitted with shortness of breath. No history of alcohol abuse. REVIEW OF SYSTEMS:  HEAD, EYES, EARS, NOSE AND THROAT:  No headache, no dizziness, no blurring of vision, no photophobia. RESPIRATORY:  This is positive for cough and shortness of breath. No hemoptysis. CARDIOVASCULAR:  This is positive for chest pain. No orthopnea. No palpitations. GASTROINTESTINAL:  No nausea and vomiting, no diarrhea, no constipation. GENITOURINARY:  No dysuria, no urgency and no frequency. All other systems are reviewed and they are negative. PHYSICAL EXAMINATION:  GENERAL APPEARANCE:  The patient appeared ill, in moderate distress.   VITAL SIGNS:  On arrival at the emergency room, temperature 99.4, pulse 117, respiratory rate 30, blood pressure 170/78, oxygen saturation 94% on room air. HEAD:  Normocephalic, atraumatic. EYES:  Normal eye movements. No redness, no drainage, no discharge. EARS:  Normal external ears, with no obvious drainage. NOSE:  No deformity and no drainage. MOUTH AND THROAT:  No visible oral lesions. NECK:  Supple, no JVD, no thyromegaly. CHEST:  Diffuse expiratory wheezing and bilateral basal crackles. HEART:  Normal S1 and S2, regular. No clinically appreciable murmur. ABDOMEN:  Soft, obese, nontender, normal bowel sounds. CENTRAL NERVOUS SYSTEM:  Alert, oriented x3. No gross focal neurological deficit. EXTREMITIES:  Edema 2+, pulses 2+ bilaterally. MUSCULOSKELETAL:  No obvious joint deformity or swelling. SKIN:  No active skin lesions seen in the exposed part of the body. PSYCHIATRIC:  Normal mood and affect. LYMPHATICS:  No cervical lymphadenopathy. DIAGNOSTIC DATA:  EKG shows a sinus tachycardia, no significant ST or T-wave abnormalities. Chest x-ray shows mild pulmonary interstitial edema. LABORATORY DATA:  Hematology:  WBC 5.0, hemoglobin 10.7, hematocrit 32.6, platelets 720. ProBNP 889. Cardiac profile:  Troponin less than 0.05. Chemistry:  Sodium 137, potassium 3.1, chloride 102, CO2 23, glucose 307, BUN 13, creatinine 1.51, calcium 7.6, bilirubin total 0.3, ALT 15, AST 20, alkaline phosphatase 121, total protein 6.9, albumin level 2.7, globulin 4.7. ABG done in the emergency room, pH 7.46, pCO2 34.3, pO2 68, bicarbonate 24.4. ASSESSMENT:  1. Acute on chronic congestive heart failure. 2.  Chronic obstructive pulmonary disease with acute exacerbation. 3.  Hypertension. 4.  Type 2 diabetes with hyperglycemia. 5.  Hypokalemia. 6.  Hypocalcemia. 7.  Anemia  8. Acute renal failure. 9.  Suspected obstructive sleep apnea. 10.  Coronary artery disease, status post stent placement. 11.  Pulmonary nodules. 12.  Acute respiratory failure with hypoxia. 13.  Dyslipidemia. 14.  Leg swelling. PLAN:  1. Acute on chronic congestive heart failure. Will admit the patient for further evaluation and treatment. Will start the patient on Lasix. Will continue beta blocker. Will hold ACE inhibitor because of the acute renal failure. Will obtain an echocardiogram to determine the ejection fraction and the type of congestive heart failure. Cardiology consult will be requested to assist in evaluation and treatment of congestive heart failure. 2.  COPD with acute exacerbation. Will place the patient on DuoNeb and Levaquin for any underlying chest infection. Will obtain a D-dimer to evaluate the patient for thromboembolism as a possible cause of her shortness of breath. It the patient's D-dimer is significantly elevated, the patient will require V/Q scan of the chest to evaluate the patient for pulmonary embolism. Will continue with supplemental oxygen as well. 3.  Hypertension. Will resume home medications, except for the ACE inhibitor because of the acute renal failure. 4.  Type 2 diabetes with hyperglycemia. Will place the patient on sliding scale with insulin coverage. Will resume home basal insulin. 5.  Hypokalemia. Will replace potassium. Will repeat a potassium level. Will also check magnesium levels. 6.  Hypocalcemia. Will check ionized calcium level to determine whether this is a true hypocalcemia. 7.  Anemia. This is due to chronic disease. Will carry out anemia workup including a stool guaiac to rule out occult gastrointestinal bleed. 8.  Acute renal failure. The patient had normal BUN and creatinine in March this year when she was discharged from the hospital.  Will obtain a renal ultrasound. Will monitor the patient closely. If there is worsening renal function, the patient may require nephrology consult. 9.  Suspected obstructive sleep apnea. This was suspected during the last hospitalization.   The patient has since been seen by Pulmonologist in the office. It is not clear whether the patient had a sleep study done. Will continue with supplemental oxygen. 10.  Coronary artery disease, status post stent placement. Will continue cardiac medication. Will check cardiac markers. Cardiology consult will be requested both for the congestive heart failure and also because of the coronary artery disease. 11.  Pulmonary nodules. Will obtain a noncontrast CT scan for reevaluation of the pulmonary nodules. Pulmonary consult will be requested to assist in further evaluation. The patient is scheduled to have a repeat CT scan of the chest done in about a week anyway. 12.  Acute respiratory failure with hypoxia. This is due to multiple etiological factors, including congestive heart failure and chronic obstructive pulmonary disease. Will await results of a noncontrast CT scan of the chest to evaluate the patient for pneumonia as another possible cause of respiratory failure. 13.  Dyslipidemia. Will check lipid profile. 14.  Leg swelling. Will check ultrasound of the lower extremity for DVT. OTHER ISSUES:  1. CODE STATUS:  THE PATIENT IS FULL CODE. 2.  Will place the patient on heparin for DVT prophylaxis.       Mildred Villanueva MD       RE/GAYLE  D: 07/02/2018 06:02     T: 07/02/2018 09:19  JOB #: [id number]  CC: Mahsa Barrera MD

## 2018-07-02 NOTE — ED NOTES
Bedside shift change report given to Tahira Flores RN  (oncoming nurse) by Hair Laguna  (offgoing nurse). Report included the following information SBAR, Intake/Output and Recent Results.

## 2018-07-02 NOTE — PROGRESS NOTES
Admission Medication Reconciliation:    Information obtained from: Rx Query/Patient Interview    Significant PMH/Disease States:   Past Medical History:   Diagnosis Date    Asthma     CAD (coronary artery disease)     [facility] failure) ([facility])     COPD (chronic obstructive pulmonary disease) ([facility] [address]     Diabetes ([facility]     Hypertension        Chief Complaint for this Admission:  SOB    Allergies:  Ivp dye [fd and c blue no. 1]    Prior to Admission Medications:   Prior to Admission Medications   Prescriptions Last Dose Informant Patient Reported? Taking? albuterol-ipratropium (DUO-NEB) 2.5 mg-0.5 mg/3 ml nebu 2018 at Unknown time  No Yes   Sig: 3 mL by Nebulization route every six (6) hours as needed. amLODIPine (NORVASC) 5 mg tablet Not Taking at Unknown time Family Member Yes No   Sig: Take 5 mg by mouth daily. aspirin (ASPIRIN) 325 mg tablet 2018 at Unknown time Family Member Yes Yes   Sig: Take 325 mg by mouth daily. bumetanide (BUMEX) 0.5 mg tablet   Yes Yes   Sig: Take 0.5 mg by mouth two (2) times a day. fluticasone-salmeterol (ADVAIR DISKUS) 250-50 mcg/dose diskus inhaler Unknown at Unknown time  Yes No   Sig: Take 1 Puff by inhalation two (2) times daily as needed. insulin NPH/insulin regular (NOVOLIN 70/30 U-100 INSULIN) 100 unit/mL (70-30) injection 2018 at Unknown time  Yes Yes   Si Units by SubCUTAneous route two (2) times a day. lisinopril (PRINIVIL, ZESTRIL) 40 mg tablet 2018 at Unknown time  Yes Yes   Sig: Take 40 mg by mouth daily. metoprolol succinate (TOPROL XL) 100 mg tablet   Yes Yes   Sig: Take 100 mg by mouth daily. montelukast (SINGULAIR) 10 mg tablet 2018 at Unknown time  No Yes   Sig: Take 1 Tab by mouth nightly. Facility-Administered Medications: None         Comments/Recommendations: Removed baclofen, amlodipine, benazepril, furosemide, prednisone, senna-docusate. Added lisinopril 40mg daily and bumetanide 0.5mg twice daily.  Pt was instructed by PCP to stop taking Amlodipine d/t fluid retention. Changed metoprolol tartrate 50mg twice daily to metoprolol succinate 100mg daily.

## 2018-07-02 NOTE — CONSULTS
[facility]  Pulmonary, Critical Care, and Sleep Medicine  Name: Lennox Garret MRN: [id number]   : 1955 Hospital: [facility]    Date: 2018          IMPRESSION:   1. Acute bronchospasm  2. Abnml chest  CT- stable SC REBEKAH and patchy GG changes- persistent/evolving R>L (c/w 3/2018)  3. ? Aspiration/nocturnal reflux event trigger # 1/2  4. COPD/asthma  5. 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 CAD with prior stent  7. ?LAKIA - obese/ snorer /class 3 airway/ + family history      RECOMMENDATIONS:   · Continue current pulm tx  · Rapid pred taper  · Continue course abx for now  · Check inflammatory markers- ACE/DAVID/CRP/HP panel  · Elective PSG- suspect LAKIA  · Screen for DVT  · Reflux precautions- consider esophogram   ·  would cancel and reschedule the f/u CT that was scheduled for later in the month       Radiology  ( personally reviewed)   Chest CT  COMPARISON: March 15, 2018     CONTRAST: None.     TECHNIQUE:  5 mm axial images were obtained through the chest. Coronal and  sagittal reconstructions were generated. CT dose reduction was achieved through  use of a standardized protocol tailored for this examination and automatic  exposure control for dose modulation.     The absence of intravenous contrast reduces the sensitivity for evaluation of  the mediastinum and upper abdominal organs.     FINDINGS:     THYROID: Small nodules are unchanged. MEDIASTINUM: Mildly enlarged subcarinal lymph node is unchanged. CAITLYN: No mass or lymphadenopathy. THORACIC AORTA: No aneurysm. MAIN PULMONARY ARTERY: Mildly enlarged, measuring 3.3 cm in diameter, stable. TRACHEA/BRONCHI: Patent. ESOPHAGUS: No wall thickening or dilatation. HEART: Normal in size. Moderate coronary artery calcifications. PLEURA: No effusion or pneumothorax. LUNGS: Increased groundglass opacities in the right upper, right lower, and left  lower lobes, with associated nodular airspace disease.   INCIDENTALLY IMAGED UPPER ABDOMEN: Unchanged calcified retroperitoneal lymph  nodes. BONES: No destructive bone lesion.     IMPRESSION  IMPRESSION:  Interval increase in groundglass opacities and nodular airspace disease in both  lungs, right greater than left, likely infectious or inflammatory in etiology. Follow-up is recommended to assure resolution. Unchanged subcarinal  lymphadenopathy.      ABG Recent Labs      18   0237   PHI  7.460*   PO2I  68*   PCO2I  34.3*          Subjective/Interval History:     Asked by hospitalist to consult on pt known to practice with exacerbation COPD/asthma. 59 yo female  Adult onset allergic/pulm issues  label of asthma/COPD  Had flu in 3/2018-- admitted [facility] [place]  abnml chest CT; w/u + IgE 1462/ +RAST  outpt f/u PAR: fairly nml PFTs- nml FEV1/FVC ratio/ TLC 76%/ DL 35%--> to 76% for [facility]  Started on Xolair- continued on Dulera/Singulair  Pt reports occas \"cheating\" with cigs. ... Now admitted acute onset NP cough and SOB. No antecedent URI sx- no sick or resp exposures  Has been sleeping supine to elevate swollen feet- night before illness recalls choking in night. ... Past severe reflux; not overt recently. .. Compliant with all rx  Feel better since ED rx      PMH-   Type 2 DM  Prior gerd  CAD remote stent L CX  PVD  HLD  S/p hysrerctomy/    ROS  Wt up since being on pred  resp sx had been well controlled  No ha/dizzy  No visual changes  No dysphagia  No gerd  nml bowel/bladder  No CP/palpitations  No rash  No joint isues  + Worsening of LE edema--> prompted lay flat  Poor sleep/ erratic schedule/insomnia. [facility] O/w 14 pt ros neg         Prior to Admission Medications   Prescriptions Last Dose Informant Patient Reported? Taking? albuterol-ipratropium (DUO-NEB) 2.5 mg-0.5 mg/3 ml nebu 2018 at Unknown time  No Yes   Sig: 3 mL by Nebulization route every six (6) hours as needed.    amLODIPine (NORVASC) 5 mg tablet Not Taking at Unknown time Family Member Yes No   Sig: Take 5 mg by mouth daily. aspirin (ASPIRIN) 325 mg tablet 2018 at Unknown time Family Member Yes Yes   Sig: Take 325 mg by mouth daily. bumetanide (BUMEX) 0.5 mg tablet   Yes Yes   Sig: Take 0.5 mg by mouth two (2) times a day. fluticasone-salmeterol (ADVAIR DISKUS) 250-50 mcg/dose diskus inhaler Unknown at Unknown time  Yes No   Sig: Take 1 Puff by inhalation two (2) times daily as needed. insulin NPH/insulin regular (NOVOLIN 70/30 U-100 INSULIN) 100 unit/mL (70-30) injection 2018 at Unknown time  Yes Yes   Si Units by SubCUTAneous route two (2) times a day. lisinopril (PRINIVIL, ZESTRIL) 40 mg tablet 2018 at Unknown time  Yes Yes   Sig: Take 40 mg by mouth daily. metoprolol succinate (TOPROL XL) 100 mg tablet   Yes Yes   Sig: Take 100 mg by mouth daily. montelukast (SINGULAIR) 10 mg tablet 2018 at Unknown time  No Yes   Sig: Take 1 Tab by mouth nightly. 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      Social History Main Topics    Smoking status: Current Every Day Smoker     Packs/day: 0.25    Smokeless tobacco: Not on file    Alcohol use No    Drug use: Not on file    Sexual activity: Not on file     Other Topics Concern    Not on file     Social History Narrative     FH  + DM/LAKIA/CAD        Objective:       Vital Signs:        Patient Vitals for the past 24 hrs:   Temp Pulse Resp BP SpO2   18 1234 98.1 °F (36.7 °C) 83 20 142/67 99 %   18 1130 97.8 °F (36.6 °C) 92 23 139/72 98 %   07/02/18 1100 - 89 21 142/79 99 %   07/02/18 1030 - 88 14 140/78 100 %   07/02/18 1000 - 97 28 158/82 97 %   07/02/18 0900 - - - 185/85 99 %   07/02/18 0854 - - - - 98 %   07/02/18 0800 98.6 °F (37 °C) (!) 102 18 (!) 189/97 97 %   07/02/18 0730 - - - (!) 186/93 99 %   07/02/18 0630 - (!) 111 26 158/83 98 %   07/02/18 0627 98.4 °F (36.9 °C) (!) 114 23 158/83 96 %   07/02/18 0600 - (!) 111 21 177/58 94 %   07/02/18 0500 - - - 166/63 90 %   07/02/18 0430 - (!) 121 24 (!) 124/111 98 %   07/02/18 0410 - - - - 97 %   07/02/18 0400 - (!) 118 27 161/72 96 %   07/02/18 0330 - (!) 116 27 145/73 96 %   07/02/18 0308 - - - - 97 %   07/02/18 0239 - - - - 95 %   07/02/18 0230 - (!) 110 23 140/65 98 %   07/02/18 0226 - - - - 95 %   07/02/18 0204 - (!) 112 - 139/52 -   07/02/18 0200 - (!) 113 23 139/52 97 %   07/02/18 0050 99.4 °F (37.4 °C) (!) 117 30 160/78 94 %         Intake/Output:   Last shift:         Last 3 shifts: 07/02 0701 - 07/02 1900  In: -   Out: 900 [Urine:900]RRIOLAST3  Intake/Output Summary (Last 24 hours) at 07/02/18 1417  Last data filed at 07/02/18 1000   Gross per 24 hour   Intake                0 ml   Output             1450 ml   Net            -1450 ml     EXAM:     Pleasant obese BF in NAD  \"I feel better\"  NC/AT  EOMI/sclera anicteric  nml ears  Edentulous- class 3 mallampati airway  No JVD adenopathy  Chest mild HI- end exp wheeze forced maneuvers- no rales  CV s1s2 RRR  abd obese soft NT  Le trace edema  Non foal sklin dry  nml affect    Data    I have personally reviewed data, flowsheets for the last 24 hours.         Labs:  Recent Labs      07/02/18   0427  07/02/18   0103   WBC  7.3  5.0   HGB  10.9*  10.7*   HCT  32.8*  32.6*   PLT  243  236     Recent Labs      07/02/18   0427  07/02/18   0103   NA  137  137   K  3.7  3.1*   CL  102  102   CO2  24  23   GLU  271*  307*   BUN  14  13   CREA  1.37*  1.51*   CA  7.5*  7.6*   MG  1.7   --    PHOS  3.7   --    ALB  2.3*  2.2*   SGOT  39* 20   ALT  17  [address] [place]

## 2018-07-02 NOTE — IP AVS SNAPSHOT
[address] 
[phone] Patient: Jacquelin Wang MRN: [id number] ZHX:[birth year] A check bekah indicates which time of day the medication should be taken. My Medications START taking these medications Instructions Each Dose to Equal  
 Morning Noon Evening Bedtime NIFEdipine ER 60 mg ER tablet Commonly known as:  PROCARDIA XL Your last dose was: Your next dose is: Take 1 Tab by mouth daily. 60 mg  
    
   
   
   
  
 predniSONE 10 mg tablet Commonly known as:  Latoya Parisa Your last dose was: Your next dose is: Take two tablets for two days, 1 tablet for 3 days, and half a tablet for 3 days  
     
   
   
   
  
 rosuvastatin 20 mg tablet Commonly known as:  CRESTOR Your last dose was: Your next dose is: Take 1 Tab by mouth nightly. 20 mg CHANGE how you take these medications Instructions Each Dose to Equal  
 Morning Noon Evening Bedtime  
 insulin NPH/insulin regular 100 unit/mL (70-30) injection Commonly known as:  NovoLIN 70/30 U-100 Insulin What changed:  how much to take Your last dose was: Your next dose is:    
   
   
 45 Units by SubCUTAneous route two (2) times a day. 45 Units CONTINUE taking these medications Instructions Each Dose to Equal  
 Morning Noon Evening Bedtime ADVAIR DISKUS 250-50 mcg/dose diskus inhaler Generic drug:  fluticasone-salmeterol Your last dose was: Your next dose is: Take 1 Puff by inhalation two (2) times daily as needed. 1 Puff  
    
   
   
   
  
 albuterol-ipratropium 2.5 mg-0.5 mg/3 ml Nebu Commonly known as:  Tobinvyn Bence Your last dose was: Your next dose is:    
   
   
 3 mL by Nebulization route every six (6) hours as needed. 3 mL aspirin 325 mg tablet Commonly known as:  ASPIRIN Your last dose was: Your next dose is: Take 325 mg by mouth daily. 325 mg  
    
   
   
   
  
 bumetanide 0.5 mg tablet Commonly known as:  Sonido Rider Your last dose was: Your next dose is: Take 0.5 mg by mouth two (2) times a day. 0.5 mg  
    
   
   
   
  
 lisinopril 40 mg tablet Commonly known as:  Perry Villalobos Your last dose was: Your next dose is: Take 40 mg by mouth daily. 40 mg  
    
   
   
   
  
 montelukast 10 mg tablet Commonly known as:  SINGULAIR Your last dose was: Your next dose is: Take 1 Tab by mouth nightly. 10 mg  
    
   
   
   
  
 TOPROL  mg tablet Generic drug:  metoprolol succinate Your last dose was: Your next dose is: Take 100 mg by mouth daily. 100 mg  
    
   
   
   
  
  
STOP taking these medications   
 amLODIPine 5 mg tablet Commonly known as:  Kodi Chaparro Where to Get Your Medications Information on where to get these meds will be given to you by the nurse or doctor. ! Ask your nurse or doctor about these medications  
  insulin NPH/insulin regular 100 unit/mL (70-30) injection NIFEdipine ER 60 mg ER tablet  
 predniSONE 10 mg tablet  
 rosuvastatin 20 mg tablet

## 2018-07-02 NOTE — ROUTINE PROCESS
TRANSFER - OUT REPORT:    Verbal report given to Mónica(name) on Jacki Romero  being transferred to [facility]) for routine progression of care       Report consisted of patients Situation, Background, Assessment and   Recommendations(SBAR). Information from the following report(s) SBAR and ED Summary was reviewed with the receiving nurse. Lines:   Peripheral IV 07/02/18 Right Forearm (Active)   Site Assessment Clean, dry, & intact 7/2/2018  8:00 AM   Phlebitis Assessment 0 7/2/2018  8:00 AM   Infiltration Assessment 0 7/2/2018  8:00 AM   Dressing Status Clean, dry, & intact 7/2/2018  8:00 AM        Opportunity for questions and clarification was provided.       Patient transported with:   [email or web address]

## 2018-07-02 NOTE — ED PROVIDER NOTES
Patient is a 60 y.o. female presenting with shortness of breath. Shortness of Breath   The problem has not changed since onset. Associated symptoms include a fever, cough and chest pain. Pertinent negatives include no headaches, no vomiting, no abdominal pain, no rash and no leg swelling. 60year old female presents with asthma/copd, chf, dm, HTN, presents with shortness of breath for several days. Positive congestion and low grade fever. Using inhalers without relief. Some chest pain, mostly when coughing. Leg swelling is better then it has been. Eating/drinking/urinating ok. Last steroid use with last admission. Quit smoking 4 months ago. No travel/surgery or history of dvt/PE. Takes aspirin. Multiple stents. Never intubated or bibap. Past Medical History:   Diagnosis Date    Asthma     CAD (coronary artery disease)     CHF (congestive heart failure) (HCC)     COPD (chronic obstructive pulmonary disease) (HCC)     Diabetes ([facility] [address]     Hypertension        Past Surgical History:   Procedure Laterality Date    CARDIAC SURG PROCEDURE UNLIST      DELIVERY       HX CORONARY STENT PLACEMENT      HX HYSTERECTOMY      VASCULAR SURGERY PROCEDURE UNLIST      leg stent         No family history on file. Social History     Social History    Marital status: SINGLE     Spouse name: N/A    Number of children: N/A    Years of education: N/A     Occupational History    Not on file. Social History Main Topics    Smoking status: Current Every Day Smoker     Packs/day: 0.25    Smokeless tobacco: Not on file    Alcohol use No    Drug use: Not on file    Sexual activity: Not on file     Other Topics Concern    Not on file     Social History Narrative         ALLERGIES: Ivp dye [fd and c blue no. 1]    Review of Systems   Constitutional: Positive for fever. HENT: Positive for congestion. Eyes: Negative for visual disturbance. Respiratory: Positive for cough and shortness of breath. Cardiovascular: Positive for chest pain. Negative for leg swelling. Gastrointestinal: Negative for abdominal pain, nausea and vomiting. Endocrine: Negative for polyuria. Genitourinary: Negative for dysuria. Musculoskeletal: Negative for gait problem. Skin: Negative for rash. Neurological: Negative for headaches. Psychiatric/Behavioral: Negative for dysphoric mood. Vitals:    07/02/18 0050   BP: 160/78   Pulse: (!) 117   Resp: 30   Temp: 99.4 °F (37.4 °C)   SpO2: 94%   Weight: 99.2 kg (218 lb 9.6 oz)   Height: 5' 4\" (1.626 m)            Physical Exam   Constitutional: She is oriented to person, place, and time. She appears well-developed and well-nourished. She appears distressed. HENT:   Head: Normocephalic and atraumatic. Mouth/Throat: Oropharynx is clear and moist. No oropharyngeal exudate. Eyes: Conjunctivae and EOM are normal. Pupils are equal, round, and reactive to light. Right eye exhibits no discharge. Left eye exhibits no discharge. No scleral icterus. Neck: Normal range of motion. Neck supple. No JVD present. Cardiovascular: Normal rate, regular rhythm, normal heart sounds and intact distal pulses. Exam reveals no gallop and no friction rub. No murmur heard. Pulmonary/Chest: No stridor. She is in respiratory distress. She has wheezes. She has no rales. She exhibits no tenderness. Abdominal: Soft. Bowel sounds are normal. She exhibits no distension and no mass. There is no tenderness. There is no rebound and no guarding. Musculoskeletal: Normal range of motion. She exhibits edema. She exhibits no tenderness. Neurological: She is alert and oriented to person, place, and time. She has normal reflexes. No cranial nerve deficit. She exhibits normal muscle tone. Coordination normal.   Skin: Skin is warm and dry. No rash noted. No erythema. Psychiatric: She has a normal mood and affect.  Her behavior is normal. Judgment and thought content normal.        MDM  Number of Diagnoses or Management Options  Acute on chronic congestive heart failure, unspecified heart failure type [facility]):   Critical Care  Total time providing critical care: 30-74 minutes  45 minutes      ED Course       Procedures    ED EKG interpretation:  Rhythm: sinus tachycardia; and regular . Rate (approx.): 115; Axis: left axis deviation; P wave: normal; QRS interval: normal ; ST/T wave: non-specific changes; This EKG was interpreted by Mary Campuzano MD,ED Provider. CXR c/w pulmonary edema. Labs reassuring except for elevated BNP and low potassium. PO potassium and IV lasix ordered. Spoke with the hospitalist who will admit.

## 2018-07-03 LAB
ARTERIAL PATENCY WRIST A: ABNORMAL
ARTERIAL PATENCY WRIST A: ABNORMAL
BASE EXCESS BLD CALC-SCNC: 3 MMOL/L
BASE EXCESS BLDV CALC-SCNC: 5 MMOL/L
BDY SITE: ABNORMAL
BDY SITE: ABNORMAL
CA-I BLD-SCNC: 1.14 MMOL/L (ref 1.12–1.32)
GAS FLOW.O2 O2 DELIVERY SYS: ABNORMAL L/MIN
GAS FLOW.O2 O2 DELIVERY SYS: ABNORMAL L/MIN
GAS FLOW.O2 SETTING OXYMISER: 4 L/M
GAS FLOW.O2 SETTING OXYMISER: 4 L/M
GLUCOSE BLD STRIP.AUTO-MCNC: 213 MG/DL (ref 65–100)
GLUCOSE BLD STRIP.AUTO-MCNC: 218 MG/DL (ref 65–100)
GLUCOSE BLD STRIP.AUTO-MCNC: 229 MG/DL (ref 65–100)
GLUCOSE BLD STRIP.AUTO-MCNC: 268 MG/DL (ref 65–100)
GLUCOSE BLD STRIP.AUTO-MCNC: 286 MG/DL (ref 65–100)
HCO3 BLD-SCNC: 28.1 MMOL/L (ref 22–26)
HCO3 BLDV-SCNC: 30.1 MMOL/L (ref 23–28)
PCO2 BLD: 49.1 MMHG (ref 35–45)
PCO2 BLDV: 47.1 MMHG (ref 41–51)
PH BLD: 7.37 [PH] (ref 7.35–7.45)
PH BLDV: 7.41 [PH] (ref 7.32–7.42)
PO2 BLD: 22 MMHG (ref 80–100)
PO2 BLDV: 22 MMHG (ref 25–40)
SAO2 % BLD: 35 % (ref 92–97)
SAO2 % BLDV: 37 % (ref 65–88)
SERVICE CMNT-IMP: ABNORMAL
SPECIMEN TYPE: ABNORMAL
SPECIMEN TYPE: ABNORMAL

## 2018-07-03 PROCEDURE — 94640 AIRWAY INHALATION TREATMENT: CPT

## 2018-07-03 PROCEDURE — 74011000250 HC RX REV CODE- 250: Performed by: INTERNAL MEDICINE

## 2018-07-03 PROCEDURE — 74011636637 HC RX REV CODE- 636/637: Performed by: INTERNAL MEDICINE

## 2018-07-03 PROCEDURE — 74011250636 HC RX REV CODE- 250/636: Performed by: INTERNAL MEDICINE

## 2018-07-03 PROCEDURE — 82803 BLOOD GASES ANY COMBINATION: CPT

## 2018-07-03 PROCEDURE — 82962 GLUCOSE BLOOD TEST: CPT

## 2018-07-03 PROCEDURE — 74011250637 HC RX REV CODE- 250/637: Performed by: INTERNAL MEDICINE

## 2018-07-03 PROCEDURE — 74011250636 HC RX REV CODE- 250/636: Performed by: NURSE PRACTITIONER

## 2018-07-03 PROCEDURE — 94762 N-INVAS EAR/PLS OXIMTRY CONT: CPT

## 2018-07-03 PROCEDURE — 94664 DEMO&/EVAL PT USE INHALER: CPT

## 2018-07-03 PROCEDURE — 65660000000 HC RM CCU STEPDOWN

## 2018-07-03 PROCEDURE — 77030027138 HC INCENT SPIROMETER -A

## 2018-07-03 PROCEDURE — 77010033678 HC OXYGEN DAILY

## 2018-07-03 RX ORDER — METOPROLOL SUCCINATE 50 MG/1
100 TABLET, EXTENDED RELEASE ORAL DAILY
Status: DISCONTINUED | OUTPATIENT
Start: 2018-07-04 | End: 2018-07-09 | Stop reason: HOSPADM

## 2018-07-03 RX ORDER — IPRATROPIUM BROMIDE AND ALBUTEROL SULFATE 2.5; .5 MG/3ML; MG/3ML
3 SOLUTION RESPIRATORY (INHALATION)
Status: DISCONTINUED | OUTPATIENT
Start: 2018-07-03 | End: 2018-07-07

## 2018-07-03 RX ORDER — INSULIN LISPRO 100 [IU]/ML
6 INJECTION, SOLUTION INTRAVENOUS; SUBCUTANEOUS
Status: DISCONTINUED | OUTPATIENT
Start: 2018-07-03 | End: 2018-07-09 | Stop reason: HOSPADM

## 2018-07-03 RX ADMIN — ACETAMINOPHEN 650 MG: 325 TABLET ORAL at 02:28

## 2018-07-03 RX ADMIN — ASPIRIN 325 MG: 325 TABLET ORAL at 09:48

## 2018-07-03 RX ADMIN — INSULIN LISPRO 6 UNITS: 100 INJECTION, SOLUTION INTRAVENOUS; SUBCUTANEOUS at 12:24

## 2018-07-03 RX ADMIN — INSULIN GLARGINE 50 UNITS: 100 INJECTION, SOLUTION SUBCUTANEOUS at 23:17

## 2018-07-03 RX ADMIN — RDII 250 MG CAPSULE 250 MG: at 09:49

## 2018-07-03 RX ADMIN — MONTELUKAST SODIUM 10 MG: 10 TABLET, FILM COATED ORAL at 21:50

## 2018-07-03 RX ADMIN — INSULIN LISPRO 5 UNITS: 100 INJECTION, SOLUTION INTRAVENOUS; SUBCUTANEOUS at 06:54

## 2018-07-03 RX ADMIN — HEPARIN SODIUM 5000 UNITS: 5000 INJECTION INTRAVENOUS; SUBCUTANEOUS at 04:55

## 2018-07-03 RX ADMIN — IPRATROPIUM BROMIDE AND ALBUTEROL SULFATE 3 ML: .5; 3 SOLUTION RESPIRATORY (INHALATION) at 22:53

## 2018-07-03 RX ADMIN — Medication 10 ML: at 23:17

## 2018-07-03 RX ADMIN — ROSUVASTATIN CALCIUM 20 MG: 10 TABLET, FILM COATED ORAL at 21:50

## 2018-07-03 RX ADMIN — BUDESONIDE 500 MCG: 0.5 INHALANT RESPIRATORY (INHALATION) at 22:53

## 2018-07-03 RX ADMIN — IPRATROPIUM BROMIDE AND ALBUTEROL SULFATE 3 ML: .5; 3 SOLUTION RESPIRATORY (INHALATION) at 16:02

## 2018-07-03 RX ADMIN — FUROSEMIDE 40 MG: 10 INJECTION, SOLUTION INTRAMUSCULAR; INTRAVENOUS at 09:49

## 2018-07-03 RX ADMIN — INSULIN LISPRO 5 UNITS: 100 INJECTION, SOLUTION INTRAVENOUS; SUBCUTANEOUS at 17:17

## 2018-07-03 RX ADMIN — IPRATROPIUM BROMIDE AND ALBUTEROL SULFATE 3 ML: .5; 3 SOLUTION RESPIRATORY (INHALATION) at 08:22

## 2018-07-03 RX ADMIN — Medication 10 ML: at 05:07

## 2018-07-03 RX ADMIN — IPRATROPIUM BROMIDE AND ALBUTEROL SULFATE 3 ML: .5; 3 SOLUTION RESPIRATORY (INHALATION) at 11:06

## 2018-07-03 RX ADMIN — METHYLPREDNISOLONE SODIUM SUCCINATE 40 MG: 40 INJECTION, POWDER, FOR SOLUTION INTRAMUSCULAR; INTRAVENOUS at 17:18

## 2018-07-03 RX ADMIN — LEVOFLOXACIN 500 MG: 5 INJECTION, SOLUTION INTRAVENOUS at 04:56

## 2018-07-03 RX ADMIN — INSULIN LISPRO 3 UNITS: 100 INJECTION, SOLUTION INTRAVENOUS; SUBCUTANEOUS at 12:23

## 2018-07-03 RX ADMIN — Medication 10 ML: at 12:25

## 2018-07-03 RX ADMIN — HEPARIN SODIUM 5000 UNITS: 5000 INJECTION INTRAVENOUS; SUBCUTANEOUS at 21:49

## 2018-07-03 RX ADMIN — METHYLPREDNISOLONE SODIUM SUCCINATE 40 MG: 40 INJECTION, POWDER, FOR SOLUTION INTRAMUSCULAR; INTRAVENOUS at 09:49

## 2018-07-03 RX ADMIN — LISINOPRIL 40 MG: 20 TABLET ORAL at 09:48

## 2018-07-03 RX ADMIN — INSULIN LISPRO 2 UNITS: 100 INJECTION, SOLUTION INTRAVENOUS; SUBCUTANEOUS at 21:50

## 2018-07-03 RX ADMIN — INSULIN LISPRO 6 UNITS: 100 INJECTION, SOLUTION INTRAVENOUS; SUBCUTANEOUS at 17:17

## 2018-07-03 RX ADMIN — BUDESONIDE 500 MCG: 0.5 INHALANT RESPIRATORY (INHALATION) at 08:22

## 2018-07-03 RX ADMIN — INSULIN GLARGINE 60 UNITS: 100 INJECTION, SOLUTION SUBCUTANEOUS at 09:49

## 2018-07-03 RX ADMIN — HEPARIN SODIUM 5000 UNITS: 5000 INJECTION INTRAVENOUS; SUBCUTANEOUS at 12:24

## 2018-07-03 RX ADMIN — METOPROLOL TARTRATE 100 MG: 50 TABLET ORAL at 09:49

## 2018-07-03 NOTE — PROGRESS NOTES
TRANSFER - IN REPORT:    Verbal report received from Leela Bravo RN (name) on [employer]  being [facility] routine progression of care. Report consisted of patients Situation, Background, Assessment and   Recommendations(SBAR). Information from the following report(s) SBAR was reviewed with the receiving nurse. Opportunity for questions and clarification was provided. Assessment completed upon patients arrival to unit and care assumed.

## 2018-07-03 NOTE — PROGRESS NOTES
Cardiology Progress Note  7/3/2018     Admit Date: 2018  Admit Diagnosis: CHF, acute on chronic (HCC)  CC: none currently    Assessment:   Principal Problem:    CHF, acute on chronic ([address] (2018)      Plan:   Slowly improving, still wheezing. No active cardiac problems at this time. Will need office f/u. Volume status:euvolemic  Renal function: stable    For other plans, see orders.   Subjective: Jacki Romero reports   Chest Pain:  [x]   none,  consistent with  []   non-cardiac   []   atypical   []   angina             [x]   none now    []      on-going  Dyspnea: [x]   none  []   at rest  []   with exertion     []   improved   []   unchanged   []   worsening  PND:       [x]   none  []   overnight    Orthopnea: [x]   none  []   improved  []   unchanged  []   worsening  Presyncope: [x]   none   []   improved    []   unchanged    []   worsening  Ambulated in hallway without symptoms  []   Yes  Ambulated in room without symptoms  []   Yes    Objective:    Physical Exam:  Overall VSSAF;    Visit Vitals    /70 (BP 1 Location: Right arm, BP Patient Position: At rest)    Pulse 98    Temp 98.8 °F (37.1 °C)    Resp 18    Ht 5' 4\" (1.626 m)    Wt 95.6 kg (210 lb 12.2 oz)    SpO2 97%    BMI 36.18 kg/m2     Temp (24hrs), Av.5 °F (36.9 °C), Min:97.8 °F (36.6 °C), Max:99.3 °F (37.4 °C)    Patient Vitals for the past 8 hrs:   Pulse   18 1503 98   18 1152 95   18 0948 (!) 119   18 0802 (!) 109    Patient Vitals for the past 8 hrs:   Resp   18 1503 18   18 1152 20   18 0802 22    Patient Vitals for the past 8 hrs:   BP   18 1503 127/70   18 1152 142/74   18 0948 150/66   18 0802 152/67        Intake/Output Summary (Last 24 hours) at 18 1514  Last data filed at 18 1152   Gross per 24 hour   Intake              240 ml   Output              600 ml   Net             -360 ml       General Appearance: Well developed, well nourished, no acute distress. Ears/Nose/Mouth/Throat:   Normal MM; anicteric. JVP: WNL   Resp:   Lungs scattered wheezes and rhonchi. Nl resp effort. Cardiovascular:  RRR, S1, S2 normal, no new murmur. No gallop or rub. Abdomen:   Soft, non-tender, bowel sounds are present. Extremities: No edema bilaterally. Skin:  Neuro: Warm and dry. A/O x3, grossly nonfocal    []      cath site intact w/o hematoma or bruit; distal pulse unchanged. Data Review:     Telemetry independently reviewed : [x]   sinus  []   chronic afib   []   par afib  []      NSVT    ECG independently reviewed:  []   NSR   []   no significant changes  []   no new ECG provided for review  Lab results reviewed as noted below. Current medications reviewed as noted below. No results for input(s): PH, PCO2, PO2 in the last 72 hours. Recent Labs      07/02/18   1740  07/02/18   1158  07/02/18 0427  07/02/18   0103   CPK   --   684*  588*   --    CKMB   --   5.2*  3.1   --    TROIQ  <0.05  <0.05   --   <0.05     Recent Labs      07/02/18   0427  07/02/18   0103   NA  137  137   K  3.7  3.1*   CL  102  102   CO2  24  23   BUN  14  13   CREA  1.37*  1.51*   GLU  271*  307*   PHOS  3.7   --    CA  7.5*  7.6*   ALB  2.3*  2.2*   WBC  7.3  5.0   HGB  10.9*  10.7*   HCT  32.8*  32.6*   PLT  243  236     Recent Labs      07/02/18   0427  07/02/18   0103   SGOT  39*  20   ALT  17  15   AP  128*  121*   TBILI  0.4  0.3   TP  6.7  6.9   ALB  2.3*  2.2*   GLOB  4.4*  4.7*     No results for input(s): INR, PTP, APTT in the last 72 hours.     No lab exists for component: INREXT   Recent Labs      07/02/18 0427   TIBC  190*   PSAT  12*   FERR  612*      Lab Results   Component Value Date/Time    Glucose (POC) 213 (H) 07/03/2018 11:54 AM    Glucose (POC) 218 (H) 07/03/2018 09:48 AM    Glucose (POC) 286 (H) 07/03/2018 06:23 AM    Glucose (POC) 350 (H) 07/02/2018 10:09 PM    Glucose (POC) 387 (H) 07/02/2018 05:47 PM       Current Facility-Administered Medications   Medication Dose Route Frequency    albuterol-ipratropium (DUO-NEB) 2.5 MG-0.5 MG/3 ML  3 mL Nebulization Q4H RT    methylPREDNISolone (PF) (SOLU-MEDROL) injection 40 mg  40 mg IntraVENous Q8H    [START ON 7/4/2018] metoprolol succinate (TOPROL-XL) XL tablet 100 mg  100 mg Oral DAILY    insulin lispro (HUMALOG) injection 6 Units  6 Units SubCUTAneous TIDAC    aspirin (ASPIRIN) tablet 325 mg  325 mg Oral DAILY    montelukast (SINGULAIR) tablet 10 mg  10 mg Oral QHS    sodium chloride (NS) flush 5-10 mL  5-10 mL IntraVENous Q8H    sodium chloride (NS) flush 5-10 mL  5-10 mL IntraVENous PRN    acetaminophen (TYLENOL) tablet 650 mg  650 mg Oral Q4H PRN    ondansetron (ZOFRAN) injection 4 mg  4 mg IntraVENous Q4H PRN    bisacodyl (DULCOLAX) tablet 5 mg  5 mg Oral DAILY PRN    heparin (porcine) injection 5,000 Units  5,000 Units SubCUTAneous Q8H    Saccharomyces boulardii (FLORASTOR) capsule 250 mg  250 mg Oral DAILY    levoFLOXacin (LEVAQUIN) 500 mg in D5W IVPB  500 mg IntraVENous Q24H    furosemide (LASIX) injection 40 mg  40 mg IntraVENous DAILY    insulin lispro (HUMALOG) injection   SubCUTAneous AC&HS    glucose chewable tablet 16 g  4 Tab Oral PRN    dextrose (D50W) injection syrg 12.5-25 g  12.5-25 g IntraVENous PRN    glucagon (GLUCAGEN) injection 1 mg  1 mg IntraMUSCular PRN    arformoterol (BROVANA) neb solution 15 mcg  15 mcg Nebulization BID RT    And    budesonide (PULMICORT) 500 mcg/2 ml nebulizer suspension  500 mcg Nebulization BID RT    insulin glargine (LANTUS) injection 60 Units  60 Units SubCUTAneous DAILY    rosuvastatin (CRESTOR) tablet 20 mg  20 mg Oral QHS    lisinopril (PRINIVIL, ZESTRIL) tablet 40 mg  40 mg Oral DAILY    insulin glargine (LANTUS) injection 50 Units  50 Units SubCUTAneous QHS        Maribell Nettles MD

## 2018-07-03 NOTE — NURSE NAVIGATOR
Follow up appointment scheduled for today canceled. Rescheduled for 7/13/18 at 3:45 pm.  Information on After Visit Summary. [facility], office of Dr. Kassy Jeffers. Available appointment scheduled with Silvina Fox NP on 7/6/18 at 10:15 am.  Information on After Visit Summary.

## 2018-07-03 NOTE — INTERDISCIPLINARY ROUNDS
IDR/SLIDR Summary          Patient: Lennox Garret MRN: [id number]    Age: 60 y.o. YOB: 1955 Room/Bed: [facility]/   Admit Diagnosis: CHF, acute on chronic (HCC)  Principal Diagnosis: CHF, acute on chronic (HCC)   Goals: plan of care  Readmission: NO  Quality Measure: CHF and COPD  VTE Prophylaxis: Chemical  Influenza Vaccine screening completed? YES  Pneumococcal Vaccine screening completed? NO/ NA   Mobility needs: No   Nutrition plan:No  Consults:Respiratory    Financial concerns:No  Escalated to CM? NO  RRAT Score: 13   Interventions:none  Testing due for pt today?  NO  LOS: 1 days Expected length of stay 1-2 more days  Discharge plan: home in 1-2 days   PCP: Felecia Liu MD  Transportation needs: No    Days before discharge:two or more days before discharge  and one day until discharge   Discharge disposition: Home    Signed:     Melonie Luciano RN  7/3/2018  3:44 PM

## 2018-07-03 NOTE — DIABETES MGMT
DTC Consult Note    Recommendations/ Comments: Patient was discussed in rounds (blood sugars >200 mg/dL) and suggestion is to add Lispro pre-meal, 6 units tid ac. Current hospital DM medication: Lantus 60 units am and 50 units pm and correction scale Humalog, normal sensitivity. Consult received for:  [x]             Assessment of home management      Chart reviewed and initial evaluation complete on Kimberlysantino Romero.     Patient is a 60 y.o. female with hx Type 2 Diabetes on Novolin 70/30 50 units BID at home.     BG monitoring 3 times a week. She reports sleeping late and taking insulin at 1 pm and 10 pm.  She checks her blood sugar about 3 times per week and does drink some regular soda, so alternatives were discussed. She declined outpatient education at this time. Assessed and instructed patient on the following:    interpretation of lab results, blood sugar goals, complications of diabetes mellitus, hypoglycemia prevention and treatment,    Encouraged the following:   · dietary modifications: avoid concentrated sweets, regular blood sugar monitorin times daily     Provided patient with the following: [x]             Survival skills education materials                                                            [x]             Outpatient DTC contact number                                                                                              Discussed with patient and/or family need for follow up appointment for diabetes management after discharge. Patient is a 60 y.o. female with known diabetes on 70/30 60 units BID at home per patient.     A1c:   Lab Results   Component Value Date/Time    Hemoglobin A1c 9.7 (H) 2018 04:27 AM    Hemoglobin A1c 8.5 (H) 2018 04:25 AM       Recent Glucose Results:   Lab Results   Component Value Date/Time    GLUCPOC 213 (H) 2018 11:54 AM    GLUCPOC 218 (H) 2018 09:48 AM    GLUCPOC 286 (H) 2018 06:23 AM        Lab Results Component Value Date/Time    Creatinine 1.37 (H) 07/02/2018 04:27 AM     Estimated Creatinine Clearance: 47.2 mL/min (based on Cr of 1.37). Active Orders   Diet    DIET DIABETIC CONSISTENT CARB Regular        PO intake:   Patient Vitals for the past 72 hrs:   % Diet Eaten   07/02/18 1600 100 %   07/02/18 1230 100 %       Will continue to follow as needed.     Thank you  Genevieve Villeda, MS, RN, CDE

## 2018-07-03 NOTE — PROGRESS NOTES
Problem: Diabetes Self-Management  Goal: *Incorporating nutritional management into lifestyle  Describe effect of type, amount and timing of food on blood glucose; list 3 methods for planning meals. Outcome: Not Progressing Towards Goal  Pt needs reinforcement about dietary changes  Goal: *Monitoring blood glucose, interpreting and using results  Identify recommended blood glucose targets  and personal targets. Outcome: Progressing Towards Goal  BG monitored ACHS    Problem: Falls - Risk of  Goal: *Absence of Falls  Document Jairo Fall Risk and appropriate interventions in the flowsheet.    Outcome: Progressing Towards Goal  Fall Risk Interventions:            Medication Interventions: Evaluate medications/consider consulting pharmacy                  Problem: Patient Education: Go to Patient Education Activity  Goal: Patient/Family Education  Outcome: Progressing Towards Goal  Pt appears steady on her feet

## 2018-07-03 NOTE — PROGRESS NOTES
TRANSFER - OUT REPORT:    Verbal report given to MARIAN Conway(name) on [employer]  being transferred to (unit) for routine progression of care       Report consisted of patients Situation, Background, Assessment and   Recommendations(SBAR). Information from the following report(s) SBAR, Kardex, Intake/Output, MAR, Accordion and Recent Results was reviewed with the receiving nurse. Lines:   Peripheral IV 07/02/18 Right Forearm (Active)   Site Assessment Clean, dry, & intact 7/3/2018  3:03 PM   Phlebitis Assessment 0 7/3/2018  3:03 PM   Infiltration Assessment 0 7/3/2018  3:03 PM   Dressing Status Clean, dry, & intact 7/3/2018  3:03 PM   Dressing Type Transparent;Tape 7/3/2018  3:03 PM   Hub Color/Line Status Pink;Capped 7/3/2018  3:03 PM   Action Taken Open ports on tubing capped 7/3/2018  3:03 PM   Alcohol Cap Used Yes 7/3/2018  3:03 PM        Opportunity for questions and clarification was provided. Patient transported with:   O2 @ 1 liters  Tech          Problem: Falls - Risk of  Goal: *Absence of Falls  Document Jairo Fall Risk and appropriate interventions in the flowsheet. Outcome: Progressing Towards Goal  Fall Risk Interventions:            Medication Interventions: Patient to call before getting OOB, Teach patient to arise slowly                  Problem: Heart Failure: Day 2  Goal: Activity/Safety  Outcome: Progressing Towards Goal  Patient up with minimal assistance. Encouraged to increase time OOB. Goal: Nutrition/Diet  Outcome: Progressing Towards Goal  Tolerating diet well.

## 2018-07-03 NOTE — CONSULTS
[facility]  Pulmonary, Critical Care, and Sleep Medicine  Name: Eileen Breen MRN: [id number]   : 1955 Hospital: [facility]    Date: 7/3/2018          IMPRESSION:   1. Acute bronchospasm  2. Abnml chest  CT- stable SC REBEKAH and patchy GG changes- persistent/evolving R>L (c/w 3/2018)  3. ? Aspiration/nocturnal reflux event trigger # 1/2  4. COPD/asthma  5. 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 CAD with prior stent  7. ?LAKIA - obese/ snorer /class 3 airway/ + family history      RECOMMENDATIONS:   · Continue current pulm tx  · Switch to IV steroids  · Continue course abx for now  · f/u inflammatory markers- ACE/DAVID/CRP/HP panel  · Elective PSG- suspect LAKIA  · Screen for DVT---> dopplers negative  · Reflux precautions- consider esophogram   ·  would cancel and reschedule the f/u CT that was scheduled for later in the month  · Will see as needed over holiday, please call       Radiology  ( personally reviewed)   Chest CT  COMPARISON: March 15, 2018     CONTRAST: None.     TECHNIQUE:  5 mm axial images were obtained through the chest. Coronal and  sagittal reconstructions were generated. CT dose reduction was achieved through  use of a standardized protocol tailored for this examination and automatic  exposure control for dose modulation.     The absence of intravenous contrast reduces the sensitivity for evaluation of  the mediastinum and upper abdominal organs.     FINDINGS:     THYROID: Small nodules are unchanged. MEDIASTINUM: Mildly enlarged subcarinal lymph node is unchanged. CAITLYN: No mass or lymphadenopathy. THORACIC AORTA: No aneurysm. MAIN PULMONARY ARTERY: Mildly enlarged, measuring 3.3 cm in diameter, stable. TRACHEA/BRONCHI: Patent. ESOPHAGUS: No wall thickening or dilatation. HEART: Normal in size. Moderate coronary artery calcifications. PLEURA: No effusion or pneumothorax.   LUNGS: Increased groundglass opacities in the right upper, right lower, and left  lower lobes, with associated nodular airspace disease. INCIDENTALLY IMAGED UPPER ABDOMEN: Unchanged calcified retroperitoneal lymph  nodes. BONES: No destructive bone lesion.     IMPRESSION  IMPRESSION:  Interval increase in groundglass opacities and nodular airspace disease in both  lungs, right greater than left, likely infectious or inflammatory in etiology. Follow-up is recommended to assure resolution. Unchanged subcarinal  lymphadenopathy.      ABG Recent Labs      18   0242  18   0237   PHI  7.365  7.460*   PO2I  22*  68*   PCO2I  49.1*  34.3*          Subjective/Interval History:   Had a rough night. Increased wheezing and dyspnea. No cp. Persistent cough, minimally productive of thick tan sputum. Afebrile. 100% on 4LPM.     Initial HPI:    61 yo female  Adult onset allergic/pulm issues  label of asthma/COPD  Had flu in 3/2018-- admitted [facility] [place]  abnml chest CT; w/u + IgE 1462/ +RAST  outpt f/u PAR: fairly nml PFTs- nml FEV1/FVC ratio/ TLC 76%/ DL 35%--> to 76% for [facility]  Started on Xolair- continued on Dulera/Singulair  Pt reports occas \"cheating\" with cigs. ... Now admitted acute onset NP cough and SOB. No antecedent URI sx- no sick or resp exposures  Has been sleeping supine to elevate swollen feet- night before illness recalls choking in night. ... Past severe reflux; not overt recently. .. Compliant with all rx  Feel better since ED rx      PMH-   Type 2 DM  Prior gerd  CAD remote stent L CX  PVD  HLD  S/p hysrerctomy/    ROS  Wt up since being on pred  resp sx had been well controlled  No ha/dizzy  No visual changes  No dysphagia  No gerd  nml bowel/bladder  No CP/palpitations  No rash  No joint isues  + Worsening of LE edema--> prompted lay flat  Poor sleep/ erratic schedule/insomnia. Ethelene Gauss Ethelene Gauss O/w 14 pt ros neg         Prior to Admission Medications   Prescriptions Last Dose Informant Patient Reported? Taking?    albuterol-ipratropium (DUO-NEB) 2.5 mg-0.5 mg/3 ml nebu 2018 at Unknown time  No Yes   Sig: 3 mL by Nebulization route every six (6) hours as needed. amLODIPine (NORVASC) 5 mg tablet Not Taking at Unknown time Family Member Yes No   Sig: Take 5 mg by mouth daily. aspirin (ASPIRIN) 325 mg tablet 2018 at Unknown time Family Member Yes Yes   Sig: Take 325 mg by mouth daily. bumetanide (BUMEX) 0.5 mg tablet   Yes Yes   Sig: Take 0.5 mg by mouth two (2) times a day. fluticasone-salmeterol (ADVAIR DISKUS) 250-50 mcg/dose diskus inhaler Unknown at Unknown time  Yes No   Sig: Take 1 Puff by inhalation two (2) times daily as needed. insulin NPH/insulin regular (NOVOLIN 70/30 U-100 INSULIN) 100 unit/mL (70-30) injection 2018 at Unknown time  Yes Yes   Si Units by SubCUTAneous route two (2) times a day. lisinopril (PRINIVIL, ZESTRIL) 40 mg tablet 2018 at Unknown time  Yes Yes   Sig: Take 40 mg by mouth daily. metoprolol succinate (TOPROL XL) 100 mg tablet   Yes Yes   Sig: Take 100 mg by mouth daily. montelukast (SINGULAIR) 10 mg tablet 2018 at Unknown time  No Yes   Sig: Take 1 Tab by mouth nightly. 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      Social History Main Topics    Smoking status: Current Every Day Smoker     Packs/day: 0.25    Smokeless tobacco: Not on file    Alcohol use No    Drug use: Not on file    Sexual activity: Not on file     Other Topics Concern    Not on file     Social History Narrative     FH  + DM/LAKIA/CAD        Objective:       Vital Signs:        Patient Vitals for the past 24 hrs:   Temp Pulse Resp BP SpO2   07/03/18 0822 - - - - 98 %   07/03/18 0802 99.3 °F (37.4 °C) (!) 109 22 152/67 99 %   07/03/18 0342 98.2 °F (36.8 °C) (!) 104 20 154/71 99 %   07/02/18 2241 98.5 °F (36.9 °C) (!) 101 25 170/85 -   07/02/18 2130 - - - - 99 %   07/02/18 1937 98.5 °F (36.9 °C) 94 24 167/90 98 %   07/02/18 1606 97.8 °F (36.6 °C) 89 20 161/84 99 %   07/02/18 1234 98.1 °F (36.7 °C) 83 20 142/67 99 %   07/02/18 1230 - - - - 99 %   07/02/18 1130 97.8 °F (36.6 °C) 92 23 139/72 98 %   07/02/18 1100 - 89 21 142/79 99 %   07/02/18 1030 - 88 14 140/78 100 %   07/02/18 1000 - 97 28 158/82 97 %   07/02/18 0900 - - - 185/85 99 %         Intake/Output:   Last shift:         Last 3 shifts:  RRIOLAST3    Intake/Output Summary (Last 24 hours) at 07/03/18 0858  Last data filed at 07/02/18 1600   Gross per 24 hour   Intake              480 ml   Output              900 ml   Net             -420 ml     EXAM:     Pleasant obese BF in NAD  NC/AT  EOMI/sclera anicteric  nml ears  Edentulous- class 3 mallampati airway  No JVD adenopathy  Chest- diffuse wheeze- no rales  CV s1s2 RRR  abd obese soft NT  Le trace edema  Non foal sklin dry  nml affect    Data    I have personally reviewed data, flowsheets for the last 24 hours.         Labs:  Recent Labs      07/02/18   0427  07/02/18   0103   WBC  7.3  5.0   HGB  10.9*  10.7*   HCT  32.8*  32.6*   PLT  243  236     Recent Labs      07/02/18   0427  07/02/18   0103   NA  137  137   K  3.7  3.1*   CL  102  102   CO2  24  23   GLU  271*  307*   BUN  14  13   CREA  1.37*  1.51*   CA  7.5*  7.6*   MG  1.7   --    PHOS  3.7   --    ALB  2.3*  2.2*   SGOT  39*  20   ALT  17  [facility], NP  [facility]

## 2018-07-03 NOTE — PROGRESS NOTES
Bedside and Verbal shift change report given to Haley Casey (oncoming nurse) by Lavell Enamorado (offgoing nurse). Report included the following information SBAR, Kardex, ED Summary, Procedure Summary, Intake/Output, MAR and Recent Results. Pt resting in bed, family present    Ambulated to restroom to wash up       PM , orders received for 5 units of coverage    Bedside and Verbal shift change report given to [address] [place] [address] [address] (oncoming nurse) by Haley Casey (offgoing nurse). Report included the following information SBAR, Kardex, ED Summary, Procedure Summary, Intake/Output, MAR and Recent Results.

## 2018-07-04 LAB
ANION GAP SERPL CALC-SCNC: 8 MMOL/L (ref 5–15)
BASOPHILS # BLD: 0 K/UL (ref 0–0.1)
BASOPHILS NFR BLD: 0 % (ref 0–1)
BUN SERPL-MCNC: 28 MG/DL (ref 6–20)
BUN/CREAT SERPL: 21 (ref 12–20)
CALCIUM SERPL-MCNC: 8.4 MG/DL (ref 8.5–10.1)
CHLORIDE SERPL-SCNC: 103 MMOL/L (ref 97–108)
CO2 SERPL-SCNC: 26 MMOL/L (ref 21–32)
CREAT SERPL-MCNC: 1.35 MG/DL (ref 0.55–1.02)
DIFFERENTIAL METHOD BLD: ABNORMAL
EOSINOPHIL # BLD: 0 K/UL (ref 0–0.4)
EOSINOPHIL NFR BLD: 0 % (ref 0–7)
ERYTHROCYTE [DISTWIDTH] IN BLOOD BY AUTOMATED COUNT: 14 % (ref 11.5–14.5)
GLUCOSE BLD STRIP.AUTO-MCNC: 191 MG/DL (ref 65–100)
GLUCOSE BLD STRIP.AUTO-MCNC: 223 MG/DL (ref 65–100)
GLUCOSE BLD STRIP.AUTO-MCNC: 252 MG/DL (ref 65–100)
GLUCOSE BLD STRIP.AUTO-MCNC: 259 MG/DL (ref 65–100)
GLUCOSE SERPL-MCNC: 216 MG/DL (ref 65–100)
HCT VFR BLD AUTO: 34.2 % (ref 35–47)
HGB BLD-MCNC: 11.3 G/DL (ref 11.5–16)
IMM GRANULOCYTES # BLD: 0.1 K/UL (ref 0–0.04)
IMM GRANULOCYTES NFR BLD AUTO: 1 % (ref 0–0.5)
LYMPHOCYTES # BLD: 1.1 K/UL (ref 0.8–3.5)
LYMPHOCYTES NFR BLD: 18 % (ref 12–49)
MCH RBC QN AUTO: 31.7 PG (ref 26–34)
MCHC RBC AUTO-ENTMCNC: 33 G/DL (ref 30–36.5)
MCV RBC AUTO: 96.1 FL (ref 80–99)
MONOCYTES # BLD: 0.3 K/UL (ref 0–1)
MONOCYTES NFR BLD: 5 % (ref 5–13)
NEUTS SEG # BLD: 4.7 K/UL (ref 1.8–8)
NEUTS SEG NFR BLD: 76 % (ref 32–75)
NRBC # BLD: 0 K/UL (ref 0–0.01)
NRBC BLD-RTO: 0 PER 100 WBC
PLATELET # BLD AUTO: 300 K/UL (ref 150–400)
PMV BLD AUTO: 9.9 FL (ref 8.9–12.9)
POTASSIUM SERPL-SCNC: 3.7 MMOL/L (ref 3.5–5.1)
RBC # BLD AUTO: 3.56 M/UL (ref 3.8–5.2)
SERVICE CMNT-IMP: ABNORMAL
SODIUM SERPL-SCNC: 137 MMOL/L (ref 136–145)
WBC # BLD AUTO: 6.2 K/UL (ref 3.6–11)

## 2018-07-04 PROCEDURE — 85025 COMPLETE CBC W/AUTO DIFF WBC: CPT | Performed by: INTERNAL MEDICINE

## 2018-07-04 PROCEDURE — 80048 BASIC METABOLIC PNL TOTAL CA: CPT | Performed by: INTERNAL MEDICINE

## 2018-07-04 PROCEDURE — 77010033678 HC OXYGEN DAILY

## 2018-07-04 PROCEDURE — 74011636637 HC RX REV CODE- 636/637: Performed by: INTERNAL MEDICINE

## 2018-07-04 PROCEDURE — 74011250637 HC RX REV CODE- 250/637: Performed by: INTERNAL MEDICINE

## 2018-07-04 PROCEDURE — 74011000250 HC RX REV CODE- 250: Performed by: INTERNAL MEDICINE

## 2018-07-04 PROCEDURE — 74011250636 HC RX REV CODE- 250/636: Performed by: NURSE PRACTITIONER

## 2018-07-04 PROCEDURE — 36415 COLL VENOUS BLD VENIPUNCTURE: CPT | Performed by: INTERNAL MEDICINE

## 2018-07-04 PROCEDURE — 74011250636 HC RX REV CODE- 250/636: Performed by: INTERNAL MEDICINE

## 2018-07-04 PROCEDURE — 82962 GLUCOSE BLOOD TEST: CPT

## 2018-07-04 PROCEDURE — 94760 N-INVAS EAR/PLS OXIMETRY 1: CPT

## 2018-07-04 PROCEDURE — 65660000000 HC RM CCU STEPDOWN

## 2018-07-04 PROCEDURE — 94640 AIRWAY INHALATION TREATMENT: CPT

## 2018-07-04 RX ADMIN — BUDESONIDE 500 MCG: 0.5 INHALANT RESPIRATORY (INHALATION) at 23:25

## 2018-07-04 RX ADMIN — IPRATROPIUM BROMIDE AND ALBUTEROL SULFATE 3 ML: .5; 3 SOLUTION RESPIRATORY (INHALATION) at 11:14

## 2018-07-04 RX ADMIN — INSULIN GLARGINE 50 UNITS: 100 INJECTION, SOLUTION SUBCUTANEOUS at 21:31

## 2018-07-04 RX ADMIN — INSULIN LISPRO 6 UNITS: 100 INJECTION, SOLUTION INTRAVENOUS; SUBCUTANEOUS at 16:52

## 2018-07-04 RX ADMIN — INSULIN LISPRO 6 UNITS: 100 INJECTION, SOLUTION INTRAVENOUS; SUBCUTANEOUS at 11:30

## 2018-07-04 RX ADMIN — HEPARIN SODIUM 5000 UNITS: 5000 INJECTION INTRAVENOUS; SUBCUTANEOUS at 06:04

## 2018-07-04 RX ADMIN — ROSUVASTATIN CALCIUM 20 MG: 10 TABLET, FILM COATED ORAL at 21:31

## 2018-07-04 RX ADMIN — IPRATROPIUM BROMIDE AND ALBUTEROL SULFATE 3 ML: .5; 3 SOLUTION RESPIRATORY (INHALATION) at 08:01

## 2018-07-04 RX ADMIN — BUDESONIDE 500 MCG: 0.5 INHALANT RESPIRATORY (INHALATION) at 08:01

## 2018-07-04 RX ADMIN — METHYLPREDNISOLONE SODIUM SUCCINATE 40 MG: 40 INJECTION, POWDER, FOR SOLUTION INTRAMUSCULAR; INTRAVENOUS at 10:00

## 2018-07-04 RX ADMIN — METHYLPREDNISOLONE SODIUM SUCCINATE 40 MG: 40 INJECTION, POWDER, FOR SOLUTION INTRAMUSCULAR; INTRAVENOUS at 02:23

## 2018-07-04 RX ADMIN — Medication 10 ML: at 06:04

## 2018-07-04 RX ADMIN — IPRATROPIUM BROMIDE AND ALBUTEROL SULFATE 3 ML: .5; 3 SOLUTION RESPIRATORY (INHALATION) at 17:04

## 2018-07-04 RX ADMIN — LEVOFLOXACIN 500 MG: 5 INJECTION, SOLUTION INTRAVENOUS at 06:04

## 2018-07-04 RX ADMIN — METOPROLOL SUCCINATE 100 MG: 50 TABLET, EXTENDED RELEASE ORAL at 08:53

## 2018-07-04 RX ADMIN — ASPIRIN 325 MG: 325 TABLET ORAL at 08:53

## 2018-07-04 RX ADMIN — Medication 10 ML: at 21:32

## 2018-07-04 RX ADMIN — LISINOPRIL 40 MG: 20 TABLET ORAL at 08:53

## 2018-07-04 RX ADMIN — INSULIN LISPRO 6 UNITS: 100 INJECTION, SOLUTION INTRAVENOUS; SUBCUTANEOUS at 07:23

## 2018-07-04 RX ADMIN — HEPARIN SODIUM 5000 UNITS: 5000 INJECTION INTRAVENOUS; SUBCUTANEOUS at 14:43

## 2018-07-04 RX ADMIN — Medication 10 ML: at 02:23

## 2018-07-04 RX ADMIN — INSULIN LISPRO 3 UNITS: 100 INJECTION, SOLUTION INTRAVENOUS; SUBCUTANEOUS at 16:51

## 2018-07-04 RX ADMIN — HEPARIN SODIUM 5000 UNITS: 5000 INJECTION INTRAVENOUS; SUBCUTANEOUS at 21:30

## 2018-07-04 RX ADMIN — Medication 10 ML: at 14:44

## 2018-07-04 RX ADMIN — RDII 250 MG CAPSULE 250 MG: at 08:53

## 2018-07-04 RX ADMIN — METHYLPREDNISOLONE SODIUM SUCCINATE 40 MG: 40 INJECTION, POWDER, FOR SOLUTION INTRAMUSCULAR; INTRAVENOUS at 18:12

## 2018-07-04 RX ADMIN — MONTELUKAST SODIUM 10 MG: 10 TABLET, FILM COATED ORAL at 21:31

## 2018-07-04 RX ADMIN — INSULIN GLARGINE 60 UNITS: 100 INJECTION, SOLUTION SUBCUTANEOUS at 08:51

## 2018-07-04 RX ADMIN — FUROSEMIDE 40 MG: 10 INJECTION, SOLUTION INTRAMUSCULAR; INTRAVENOUS at 08:54

## 2018-07-04 RX ADMIN — INSULIN LISPRO 5 UNITS: 100 INJECTION, SOLUTION INTRAVENOUS; SUBCUTANEOUS at 07:30

## 2018-07-04 RX ADMIN — INSULIN LISPRO 5 UNITS: 100 INJECTION, SOLUTION INTRAVENOUS; SUBCUTANEOUS at 11:43

## 2018-07-04 RX ADMIN — IPRATROPIUM BROMIDE AND ALBUTEROL SULFATE 3 ML: .5; 3 SOLUTION RESPIRATORY (INHALATION) at 23:25

## 2018-07-04 NOTE — PROGRESS NOTES
Bedside shift change report given to Josie Mckeon (oncoming nurse) by Vikki (offgoing nurse). Report included the following information SBAR, Kardex, Intake/Output, MAR and Recent Results.

## 2018-07-04 NOTE — PROGRESS NOTES
Primary Nurse Anjelica Montejo RN and Kaylin Echevarria RN performed a dual skin assessment on this patient No impairment noted  Waldo score is 22

## 2018-07-04 NOTE — PROGRESS NOTES
Bedside and Verbal shift change report given to Vikki, [facility] (oncoming nurse) by Galina Benítez RN (offgoing nurse). Report included the following information SBAR, Intake/Output and MAR.

## 2018-07-04 NOTE — PROGRESS NOTES
Hospitalist Progress Note  Ashley Garcia MD  Answering service: [phone] OR [phone] from in house phone  Cell: [phone]      Date of Service:  2018  NAME:  Ayana Wilhelm  :  1955  MRN:  [id number]      Admission Summary:     Ms. Trinidad Cline is a 58-year-old woman with a past medical history significant for congestive heart failure, recent diagnosis of COPD, hypertension, type 2 diabetes, coronary artery disease, status post stent placement, pulmonary nodules, dyslipidemia and suspected obstructive sleep apnea, was in her usual state of health until a few days ago, when the patient developed shortness of breath. The shortness of breath is progressive, associated with a fever and cough. The cough is productive of yellowish sputum. The shortness of breath is with little or no exertion. The fever is not associated with rigors and chills. The patient was last admitted to this hospital from 03/15/2018 through 2018. She was admitted with shortness of breath. The shortness of breath was attributed to suspected COPD. The patient was treated. During that hospitalization, a CT scan of the chest was obtained. The CT scan shows pulmonary nodules. The patient has since had a followup by the pulmonologist, in which the COPD was confirmed with PFTs. Interval history / Subjective: This AM patient stating her breathing is better. Not quite at baseline. Continues to wheeze. No F/C.      Assessment & Plan:     Acute on chronic diastolic congestive heart failure, NYHA Class III-IV on admission:  -Continue IV Lasix for diuresis  -continue beta blocker and ACE   -ECHO 3/2018 with EF of 60%  -Cardology consulted  -appreciate further recs    COPD with acute exacerbation:   -Duo-nebs to scheduled  -Steroids changed to IV by Pulmonary  -continue Brovana and Pulmicort  -continue Levaquin    Elevated d-dimer: Patient with negative dopplers for DVT  -re-check creatinine and consider need for CTA vs. V/Q    Hypertension: Continue home medications. Type 2 diabetes with hyperglycemia: Continue insulin. Hypokalemia, replete as needed. Hypocalcemia, ionized level WNL. Anemia. This is due to chronic disease. Acute renal insuffiency, not failure, improving. Due to volume overload?  -monitor     Suspected obstructive sleep apnea. This was suspected during the last hospitalization. The patient has since been seen by Pulmonologist in the office. -?sleep study done   -will need outpatient follow-up    Coronary artery disease, status post stent placement. Pulmonary ground glass opacities seen on imaging. Interval increase.  -Pulmonary following   -no need to get outpatient scan again for now      Acute respiratory failure with hypoxia: This is due to multiple etiological factors, including congestive heart failure and chronic obstructive pulmonary disease. Dyslipidemia: Continue statin. Leg swelling: Dopplers negative. Code status: FULL  DVT prophylaxis: heparin    Care Plan discussed with: Patient/Family and Nurse  Disposition: Home w/Family and TBD     Hospital Problems  Date Reviewed: 7/2/2018          Codes Class Noted POA    * (Principal)CHF, acute on chronic ([facility] [address] ICD-10-CM: I50.9  ICD-9-CM: 428.0  7/2/2018 Yes              Review of Systems:   Pertinent items are noted in HPI. Vital Signs:    Last 24hrs VS reviewed since prior progress note.  Most recent are:  Visit Vitals    /79 (BP 1 Location: Left arm, BP Patient Position: Sitting)    Pulse (!) 103    Temp 98.1 °F (36.7 °C)    Resp 16    Ht 5' 4\" (1.626 m)    Wt 95.2 kg (209 lb 14.1 oz)    SpO2 94%    BMI 36.03 kg/m2         Intake/Output Summary (Last 24 hours) at 07/04/18 0846  Last data filed at 07/04/18 0804   Gross per 24 hour   Intake              240 ml   Output              600 ml   Net             -360 ml        Physical Examination: Constitutional:  No acute distress, cooperative, pleasant    ENT:  Neck supple   Resp:  Coarse sounds and wheezing bilaterally   CV:  Regular rhythm, normal rate    GI:  Soft, non distended, non tender. Musculoskeletal:  1+ edema    Neurologic:  Moves all extremities. Non-focal        Data Review:    Review and/or order of clinical lab test  Review and/or order of tests in the radiology section of [facility]  Review and/or order of tests in the medicine section of [facility]    Labs:     Recent Labs      07/04/18 0236 07/02/18 0427   WBC  6.2  7.3   HGB  11.3*  10.9*   HCT  34.2*  32.8*   PLT  300  243     Recent Labs      07/04/18   0236  07/02/18   0427 07/02/18   0103   NA  137  137  137   K  3.7  3.7  3.1*   CL  103  102  102   CO2  26  24  23   BUN  28*  14  13   CREA  1.35*  1.37*  1.51*   GLU  216*  271*  307*   CA  8.4*  7.5*  7.6*   MG   --   1.7   --    PHOS   --   3.7   --      Recent Labs      07/02/18 0427 07/02/18   0103   SGOT  39*  20   ALT  17  15   AP  128*  121*   TBILI  0.4  0.3   TP  6.7  6.9   ALB  2.3*  2.2*   GLOB  4.4*  4.7*     No results for input(s): INR, PTP, APTT in the last 72 hours. No lab exists for component: INREXT, INREXT   Recent Labs      07/02/18 0427   TIBC  190*   PSAT  12*   FERR  612*      Lab Results   Component Value Date/Time    Folate 7.3 07/02/2018 04:27 AM      No results for input(s): PH, PCO2, PO2 in the last 72 hours.   Recent Labs      07/02/18   1740  07/02/18   1158  07/02/18 0427 07/02/18   0103   CPK   --   684*  588*   --    CKNDX   --   0.8  0.5   --    TROIQ  <0.05  <0.05   --   <0.05     Lab Results   Component Value Date/Time    Cholesterol, total 260 (H) 07/02/2018 04:27 AM    HDL Cholesterol 31 07/02/2018 04:27 AM    LDL, calculated 197.2 (H) 07/02/2018 04:27 AM    Triglyceride 159 (H) 07/02/2018 04:27 AM    CHOL/HDL Ratio 8.4 (H) 07/02/2018 04:27 AM     Lab Results   Component Value Date/Time    Glucose (POC) 252 (H) 07/04/2018 06:24 AM Glucose (POC) 229 (H) 07/03/2018 09:09 PM    Glucose (POC) 268 (H) 07/03/2018 04:59 PM    Glucose (POC) 213 (H) 07/03/2018 11:54 AM    Glucose (POC) 218 (H) 07/03/2018 09:48 AM     Lab Results   Component Value Date/Time    Color YELLOW/STRAW 07/02/2018 12:05 PM    Appearance CLEAR 07/02/2018 12:05 PM    Specific gravity 1.011 07/02/2018 12:05 PM    Specific gravity 1.010 06/08/2011 12:00 AM    pH (UA) 5.0 07/02/2018 12:05 PM    Protein 300 (A) 07/02/2018 12:05 PM    Glucose >1000 (A) 07/02/2018 12:05 PM    Ketone NEGATIVE  07/02/2018 12:05 PM    Bilirubin NEGATIVE  07/02/2018 12:05 PM    Urobilinogen 0.2 07/02/2018 12:05 PM    Nitrites NEGATIVE  07/02/2018 12:05 PM    Leukocyte Esterase NEGATIVE  07/02/2018 12:05 PM    Epithelial cells FEW 07/02/2018 12:05 PM    Bacteria NEGATIVE  07/02/2018 12:05 PM    WBC 0-4 07/02/2018 12:05 PM    RBC 0-5 07/02/2018 12:05 PM         Medications Reviewed:     Current Facility-Administered Medications   Medication Dose Route Frequency    albuterol-ipratropium (DUO-NEB) 2.5 MG-0.5 MG/3 ML  3 mL Nebulization Q4H RT    methylPREDNISolone (PF) (SOLU-MEDROL) injection 40 mg  40 mg IntraVENous Q8H    metoprolol succinate (TOPROL-XL) XL tablet 100 mg  100 mg Oral DAILY    insulin lispro (HUMALOG) injection 6 Units  6 Units SubCUTAneous TIDAC    aspirin (ASPIRIN) tablet 325 mg  325 mg Oral DAILY    montelukast (SINGULAIR) tablet 10 mg  10 mg Oral QHS    sodium chloride (NS) flush 5-10 mL  5-10 mL IntraVENous Q8H    sodium chloride (NS) flush 5-10 mL  5-10 mL IntraVENous PRN    acetaminophen (TYLENOL) tablet 650 mg  650 mg Oral Q4H PRN    ondansetron (ZOFRAN) injection 4 mg  4 mg IntraVENous Q4H PRN    bisacodyl (DULCOLAX) tablet 5 mg  5 mg Oral DAILY PRN    heparin (porcine) injection 5,000 Units  5,000 Units SubCUTAneous Q8H    Saccharomyces boulardii (FLORASTOR) capsule 250 mg  250 mg Oral DAILY    levoFLOXacin (LEVAQUIN) 500 mg in D5W IVPB  500 mg IntraVENous Q24H    furosemide (LASIX) injection 40 mg  40 mg IntraVENous DAILY    insulin lispro (HUMALOG) injection   SubCUTAneous AC&HS    glucose chewable tablet 16 g  4 Tab Oral PRN    dextrose (D50W) injection syrg 12.5-25 g  12.5-25 g IntraVENous PRN    glucagon (GLUCAGEN) injection 1 mg  1 mg IntraMUSCular PRN    arformoterol (BROVANA) neb solution 15 mcg  15 mcg Nebulization BID RT    And    budesonide (PULMICORT) 500 mcg/2 ml nebulizer suspension  500 mcg Nebulization BID RT    insulin glargine (LANTUS) injection 60 Units  60 Units SubCUTAneous DAILY    rosuvastatin (CRESTOR) tablet 20 mg  20 mg Oral QHS    lisinopril (PRINIVIL, ZESTRIL) tablet 40 mg  40 mg Oral DAILY    insulin glargine (LANTUS) injection 50 Units  50 Units SubCUTAneous QHS     ______________________________________________________________________  EXPECTED LENGTH OF STAY: 4d 12h  ACTUAL LENGTH OF STAY:          2                 Sayda Preston MD

## 2018-07-04 NOTE — PROGRESS NOTES
Bedside and Verbal shift change report given to Elian Odell (oncoming nurse) by Christy Diaz (offgoing nurse). Report included the following information SBAR, Kardex, Intake/Output and MAR.

## 2018-07-05 ENCOUNTER — APPOINTMENT (OUTPATIENT)
Dept: GENERAL RADIOLOGY | Age: 63
DRG: 190 | End: 2018-07-05
Attending: PHYSICIAN ASSISTANT
Payer: COMMERCIAL

## 2018-07-05 ENCOUNTER — APPOINTMENT (OUTPATIENT)
Dept: CT IMAGING | Age: 63
DRG: 190 | End: 2018-07-05
Attending: INTERNAL MEDICINE
Payer: COMMERCIAL

## 2018-07-05 LAB
ACE SERPL-CCNC: < 15 U/L (ref 14–82)
ANION GAP SERPL CALC-SCNC: 9 MMOL/L (ref 5–15)
BASOPHILS # BLD: 0 K/UL (ref 0–0.1)
BASOPHILS NFR BLD: 0 % (ref 0–1)
BUN SERPL-MCNC: 36 MG/DL (ref 6–20)
BUN/CREAT SERPL: 32 (ref 12–20)
CALCIUM SERPL-MCNC: 8 MG/DL (ref 8.5–10.1)
CHLORIDE SERPL-SCNC: 104 MMOL/L (ref 97–108)
CO2 SERPL-SCNC: 26 MMOL/L (ref 21–32)
CREAT SERPL-MCNC: 1.13 MG/DL (ref 0.55–1.02)
DIFFERENTIAL METHOD BLD: ABNORMAL
EOSINOPHIL # BLD: 0 K/UL (ref 0–0.4)
EOSINOPHIL NFR BLD: 0 % (ref 0–7)
ERYTHROCYTE [DISTWIDTH] IN BLOOD BY AUTOMATED COUNT: 14.6 % (ref 11.5–14.5)
GLUCOSE BLD STRIP.AUTO-MCNC: 100 MG/DL (ref 65–100)
GLUCOSE BLD STRIP.AUTO-MCNC: 134 MG/DL (ref 65–100)
GLUCOSE BLD STRIP.AUTO-MCNC: 143 MG/DL (ref 65–100)
GLUCOSE BLD STRIP.AUTO-MCNC: 195 MG/DL (ref 65–100)
GLUCOSE SERPL-MCNC: 182 MG/DL (ref 65–100)
HCT VFR BLD AUTO: 35.6 % (ref 35–47)
HGB BLD-MCNC: 11.6 G/DL (ref 11.5–16)
IMM GRANULOCYTES # BLD: 0 K/UL
IMM GRANULOCYTES NFR BLD AUTO: 0 %
LYMPHOCYTES # BLD: 2.4 K/UL (ref 0.8–3.5)
LYMPHOCYTES NFR BLD: 29 % (ref 12–49)
MCH RBC QN AUTO: 31.4 PG (ref 26–34)
MCHC RBC AUTO-ENTMCNC: 32.6 G/DL (ref 30–36.5)
MCV RBC AUTO: 96.5 FL (ref 80–99)
MONOCYTES # BLD: 0.5 K/UL (ref 0–1)
MONOCYTES NFR BLD: 6 % (ref 5–13)
MYELOCYTES NFR BLD MANUAL: 1 %
NEUTS SEG # BLD: 5.3 K/UL (ref 1.8–8)
NEUTS SEG NFR BLD: 64 % (ref 32–75)
NRBC # BLD: 0 K/UL (ref 0–0.01)
NRBC BLD-RTO: 0 PER 100 WBC
PLATELET # BLD AUTO: 339 K/UL (ref 150–400)
PMV BLD AUTO: 9.6 FL (ref 8.9–12.9)
POTASSIUM SERPL-SCNC: 3.5 MMOL/L (ref 3.5–5.1)
RBC # BLD AUTO: 3.69 M/UL (ref 3.8–5.2)
RBC MORPH BLD: ABNORMAL
SERVICE CMNT-IMP: ABNORMAL
SERVICE CMNT-IMP: NORMAL
SODIUM SERPL-SCNC: 139 MMOL/L (ref 136–145)
WBC # BLD AUTO: 8.3 K/UL (ref 3.6–11)

## 2018-07-05 PROCEDURE — 74011000258 HC RX REV CODE- 258: Performed by: INTERNAL MEDICINE

## 2018-07-05 PROCEDURE — 85025 COMPLETE CBC W/AUTO DIFF WBC: CPT | Performed by: INTERNAL MEDICINE

## 2018-07-05 PROCEDURE — 74011636637 HC RX REV CODE- 636/637: Performed by: INTERNAL MEDICINE

## 2018-07-05 PROCEDURE — 94640 AIRWAY INHALATION TREATMENT: CPT

## 2018-07-05 PROCEDURE — 74011636320 HC RX REV CODE- 636/320: Performed by: INTERNAL MEDICINE

## 2018-07-05 PROCEDURE — 77030029684 HC NEB SM VOL KT MONA -A

## 2018-07-05 PROCEDURE — 36415 COLL VENOUS BLD VENIPUNCTURE: CPT | Performed by: INTERNAL MEDICINE

## 2018-07-05 PROCEDURE — 71275 CT ANGIOGRAPHY CHEST: CPT

## 2018-07-05 PROCEDURE — 74011250637 HC RX REV CODE- 250/637: Performed by: INTERNAL MEDICINE

## 2018-07-05 PROCEDURE — 74011250636 HC RX REV CODE- 250/636: Performed by: NURSE PRACTITIONER

## 2018-07-05 PROCEDURE — 77010033678 HC OXYGEN DAILY

## 2018-07-05 PROCEDURE — 74011250636 HC RX REV CODE- 250/636: Performed by: INTERNAL MEDICINE

## 2018-07-05 PROCEDURE — 94664 DEMO&/EVAL PT USE INHALER: CPT

## 2018-07-05 PROCEDURE — 74220 X-RAY XM ESOPHAGUS 1CNTRST: CPT

## 2018-07-05 PROCEDURE — 74011000250 HC RX REV CODE- 250: Performed by: INTERNAL MEDICINE

## 2018-07-05 PROCEDURE — 65660000000 HC RM CCU STEPDOWN

## 2018-07-05 PROCEDURE — 82962 GLUCOSE BLOOD TEST: CPT

## 2018-07-05 PROCEDURE — 80048 BASIC METABOLIC PNL TOTAL CA: CPT | Performed by: INTERNAL MEDICINE

## 2018-07-05 RX ORDER — DIPHENHYDRAMINE HYDROCHLORIDE 50 MG/ML
50 INJECTION, SOLUTION INTRAMUSCULAR; INTRAVENOUS
Status: COMPLETED | OUTPATIENT
Start: 2018-07-05 | End: 2018-07-05

## 2018-07-05 RX ORDER — SODIUM CHLORIDE 0.9 % (FLUSH) 0.9 %
10 SYRINGE (ML) INJECTION
Status: COMPLETED | OUTPATIENT
Start: 2018-07-05 | End: 2018-07-05

## 2018-07-05 RX ADMIN — INSULIN LISPRO 2 UNITS: 100 INJECTION, SOLUTION INTRAVENOUS; SUBCUTANEOUS at 08:19

## 2018-07-05 RX ADMIN — Medication 10 ML: at 15:49

## 2018-07-05 RX ADMIN — IPRATROPIUM BROMIDE AND ALBUTEROL SULFATE 3 ML: .5; 3 SOLUTION RESPIRATORY (INHALATION) at 22:01

## 2018-07-05 RX ADMIN — SODIUM CHLORIDE 100 ML: 900 INJECTION, SOLUTION INTRAVENOUS at 20:06

## 2018-07-05 RX ADMIN — Medication 10 ML: at 14:00

## 2018-07-05 RX ADMIN — FUROSEMIDE 40 MG: 10 INJECTION, SOLUTION INTRAMUSCULAR; INTRAVENOUS at 08:53

## 2018-07-05 RX ADMIN — INSULIN GLARGINE 60 UNITS: 100 INJECTION, SOLUTION SUBCUTANEOUS at 08:57

## 2018-07-05 RX ADMIN — IPRATROPIUM BROMIDE AND ALBUTEROL SULFATE 3 ML: .5; 3 SOLUTION RESPIRATORY (INHALATION) at 16:23

## 2018-07-05 RX ADMIN — ARFORMOTEROL TARTRATE 15 MCG: 15 SOLUTION RESPIRATORY (INHALATION) at 09:42

## 2018-07-05 RX ADMIN — Medication 10 ML: at 20:06

## 2018-07-05 RX ADMIN — IOPAMIDOL 80 ML: 755 INJECTION, SOLUTION INTRAVENOUS at 20:06

## 2018-07-05 RX ADMIN — BUDESONIDE 500 MCG: 0.5 INHALANT RESPIRATORY (INHALATION) at 22:01

## 2018-07-05 RX ADMIN — INSULIN GLARGINE 50 UNITS: 100 INJECTION, SOLUTION SUBCUTANEOUS at 22:31

## 2018-07-05 RX ADMIN — ASPIRIN 325 MG: 325 TABLET ORAL at 08:54

## 2018-07-05 RX ADMIN — RDII 250 MG CAPSULE 250 MG: at 08:54

## 2018-07-05 RX ADMIN — ROSUVASTATIN CALCIUM 20 MG: 10 TABLET, FILM COATED ORAL at 21:12

## 2018-07-05 RX ADMIN — METHYLPREDNISOLONE SODIUM SUCCINATE 40 MG: 40 INJECTION, POWDER, FOR SOLUTION INTRAMUSCULAR; INTRAVENOUS at 03:38

## 2018-07-05 RX ADMIN — METHYLPREDNISOLONE SODIUM SUCCINATE 40 MG: 40 INJECTION, POWDER, FOR SOLUTION INTRAMUSCULAR; INTRAVENOUS at 15:49

## 2018-07-05 RX ADMIN — IPRATROPIUM BROMIDE AND ALBUTEROL SULFATE 3 ML: .5; 3 SOLUTION RESPIRATORY (INHALATION) at 12:49

## 2018-07-05 RX ADMIN — LEVOFLOXACIN 500 MG: 5 INJECTION, SOLUTION INTRAVENOUS at 04:40

## 2018-07-05 RX ADMIN — INSULIN LISPRO 6 UNITS: 100 INJECTION, SOLUTION INTRAVENOUS; SUBCUTANEOUS at 08:20

## 2018-07-05 RX ADMIN — HEPARIN SODIUM 5000 UNITS: 5000 INJECTION INTRAVENOUS; SUBCUTANEOUS at 05:16

## 2018-07-05 RX ADMIN — BUDESONIDE 500 MCG: 0.5 INHALANT RESPIRATORY (INHALATION) at 09:42

## 2018-07-05 RX ADMIN — HEPARIN SODIUM 5000 UNITS: 5000 INJECTION INTRAVENOUS; SUBCUTANEOUS at 21:12

## 2018-07-05 RX ADMIN — MONTELUKAST SODIUM 10 MG: 10 TABLET, FILM COATED ORAL at 21:12

## 2018-07-05 RX ADMIN — Medication 10 ML: at 08:21

## 2018-07-05 RX ADMIN — DIPHENHYDRAMINE HYDROCHLORIDE 50 MG: 50 INJECTION, SOLUTION INTRAMUSCULAR; INTRAVENOUS at 18:49

## 2018-07-05 RX ADMIN — LISINOPRIL 40 MG: 20 TABLET ORAL at 08:54

## 2018-07-05 RX ADMIN — Medication 10 ML: at 21:13

## 2018-07-05 RX ADMIN — METOPROLOL SUCCINATE 100 MG: 50 TABLET, EXTENDED RELEASE ORAL at 08:54

## 2018-07-05 RX ADMIN — IPRATROPIUM BROMIDE AND ALBUTEROL SULFATE 3 ML: .5; 3 SOLUTION RESPIRATORY (INHALATION) at 05:00

## 2018-07-05 NOTE — PROGRESS NOTES
Pulmonary, Critical Care, and Sleep Medicine~Progress Note    Name: Sabrina Kc MRN: [id number]   : 1955 [facility]   Date: 2018 9:48 AM Admission: 2018      IMPRESSION:   1. Acute bronchospasm; improving   2. Abnml chest  CT- stable SC REBEKAH and patchy GG changes- persistent/evolving R>L (c/w 3/2018)  3. ? Aspiration/nocturnal reflux event trigger # 1/2  4. COPD/asthma  5. 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 CAD with prior stent  7. ?LAKIA - obese/ snorer /class 3 airway/ + family history       RECOMMENDATIONS:   · Continue current pulm tx  · Rapid pred taper; per Dr Jocelin Calvillo  · Continue course abx for now  · Check inflammatory markers- ACE normal/DAVID pending /CRP elevated/RF normal/ HP panel was not ordered   · Obtain PSG as an outpatient - suspect LAKIA  · Screen for DVT  · Esophogram for GERD  ·  would cancel and reschedule the f/u CT that was scheduled for later in the month~ defer to Dr Jocelin Calvillo      Daily Progression:    Feeling better. It sounds like GERD may be at least one of the causes for the above issues. I have reviewed the labs and previous days notes. Pertinent items are noted in HPI.     OBJECTIVE:     Vital Signs:       Visit Vitals    BP (!) 151/92 (BP 1 Location: Right arm, BP Patient Position: At rest)    Pulse 94    Temp 97.7 °F (36.5 °C)    Resp 16    Ht 5' 4\" (1.626 m)    Wt 94.4 kg (208 lb 1.8 oz)    SpO2 98%    BMI 35.72 kg/m2      Temp (24hrs), Av.5 °F (36.4 °C), Min:97 °F (36.1 °C), Max:97.9 °F (36.6 °C)     Intake/Output:     Last shift:      Last 3 shifts: 1 -  0700  In: 240 [P.O.:240]  Out: -       No intake or output data in the 24 hours ending 18 0948      Physical Exam:                                        Exam Findings Other   General: No resp distress noted, appears stated age    HEENT:  No ulcers, JVD not elevated, no cervical LAD Chest: No pectus deformity, normal chest rise b/l    HEART:  RRR, no murmurs/rubs/gallops    Lungs:  CTA b/l, no rhonchi/crackles/wheeze, diminished BS at bases    ABD: Soft/NT, non rigid mildly distended    EXT: No cyanosis/clubbing/edema, normal peripheral pulses    Skin: No rashes or ulcers, no mottling    Neuro: A/O x 3        Medications:  Current Facility-Administered Medications   Medication Dose Route Frequency    albuterol-ipratropium (DUO-NEB) 2.5 MG-0.5 MG/3 ML  3 mL Nebulization Q4H RT    methylPREDNISolone (PF) (SOLU-MEDROL) injection 40 mg  40 mg IntraVENous Q8H    metoprolol succinate (TOPROL-XL) XL tablet 100 mg  100 mg Oral DAILY    insulin lispro (HUMALOG) injection 6 Units  6 Units SubCUTAneous TIDAC    aspirin (ASPIRIN) tablet 325 mg  325 mg Oral DAILY    montelukast (SINGULAIR) tablet 10 mg  10 mg Oral QHS    sodium chloride (NS) flush 5-10 mL  5-10 mL IntraVENous Q8H    sodium chloride (NS) flush 5-10 mL  5-10 mL IntraVENous PRN    acetaminophen (TYLENOL) tablet 650 mg  650 mg Oral Q4H PRN    ondansetron (ZOFRAN) injection 4 mg  4 mg IntraVENous Q4H PRN    bisacodyl (DULCOLAX) tablet 5 mg  5 mg Oral DAILY PRN    heparin (porcine) injection 5,000 Units  5,000 Units SubCUTAneous Q8H    Saccharomyces boulardii (FLORASTOR) capsule 250 mg  250 mg Oral DAILY    levoFLOXacin (LEVAQUIN) 500 mg in D5W IVPB  500 mg IntraVENous Q24H    furosemide (LASIX) injection 40 mg  40 mg IntraVENous DAILY    insulin lispro (HUMALOG) injection   SubCUTAneous AC&HS    glucose chewable tablet 16 g  4 Tab Oral PRN    dextrose (D50W) injection syrg 12.5-25 g  12.5-25 g IntraVENous PRN    glucagon (GLUCAGEN) injection 1 mg  1 mg IntraMUSCular PRN    arformoterol (BROVANA) neb solution 15 mcg  15 mcg Nebulization BID RT    And    budesonide (PULMICORT) 500 mcg/2 ml nebulizer suspension  500 mcg Nebulization BID RT    insulin glargine (LANTUS) injection 60 Units  60 Units SubCUTAneous DAILY    rosuvastatin (CRESTOR) tablet 20 mg  20 mg Oral QHS    lisinopril (PRINIVIL, ZESTRIL) tablet 40 mg  40 mg Oral DAILY    insulin glargine (LANTUS) injection 50 Units  50 Units SubCUTAneous QHS       Labs:  ABG Recent Labs      07/03/18   0242   PHI  7.365   PCO2I  49.1*   PO2I  22*   HCO3I  28.1*   SO2I  35*        CBC Recent Labs      07/05/18   0529  07/04/18   0236   WBC  8.3  6.2   HGB  11.6  11.3*   HCT  35.6  34.2*   PLT  339  300   MCV  96.5  96.1   MCH  31.4  49.7        Metabolic  Panel Recent Labs      07/05/18   0529  07/04/18   0236   NA  139  137   K  3.5  3.7   CL  104  103   CO2  26  26   GLU  182*  216*   BUN  36*  28*   CREA  1.13*  1.35*   CA  8.0*  8.4*        Pertinent Labs                JAZ Shaw  7/5/2018

## 2018-07-05 NOTE — DIABETES MGMT
DTC Progress Note    Recommendations/ Comments: Chart review for hyperglycemia - BG's > 200 mg/dl yesterday. She received 13 units yesterday  BG's improved today. Noted steroids tapered to Methyl Prednisone 40 mg Q 12 hours. No recommendations at this time. DTC will continue to follow. Current hospital DM medication: Lantus 60 units each AM and 50 units each PM, lispro 6 units AC and lispro normal correction scale. Chart reviewed on Jacki Romero. Patient is a 60 y.o. female with known diabetes on 70/30 70 units BID at home. A1c:   Lab Results   Component Value Date/Time    Hemoglobin A1c 9.7 (H) 07/02/2018 04:27 AM    Hemoglobin A1c 8.5 (H) 03/16/2018 04:25 AM       Recent Glucose Results: Lab Results   Component Value Date/Time     (H) 07/05/2018 05:29 AM    GLUCPOC 134 (H) 07/05/2018 11:28 AM    GLUCPOC 195 (H) 07/05/2018 06:10 AM    GLUCPOC 191 (H) 07/04/2018 09:04 PM        Lab Results   Component Value Date/Time    Creatinine 1.13 (H) 07/05/2018 05:29 AM     Estimated Creatinine Clearance: 56.8 mL/min (based on Cr of 1.13). Active Orders   Diet    DIET DIABETIC CONSISTENT CARB Regular        PO intake: Patient Vitals for the past 72 hrs:   % Diet Eaten   07/04/18 0804 100 %   07/03/18 1751 100 %   07/03/18 1152 100 %   07/03/18 0802 50 %   07/02/18 1600 100 %       Will continue to follow as needed.     Thank you  Armani Casey RN, CDE

## 2018-07-05 NOTE — PROGRESS NOTES
.Care Management Interventions  PCP Verified by CM: Yes (Dr. Willean Shone)  Palliative Care Criteria Met (RRAT>21 & CHF Dx)?: No  Mode of Transport at Discharge: Self  Transition of Care Consult (CM Consult): Discharge Planning  MyChart Signup: No  Discharge Durable Medical Equipment: No  Health Maintenance Reviewed: Yes  Physical Therapy Consult: No  Occupational Therapy Consult: No  Speech Therapy Consult: No  Current Support Network: Own Home, Lives Alone (Family Support)  Confirm Follow Up Transport: Family  Plan discussed with Pt/Family/Caregiver: Yes  Freedom of Choice Offered: Yes   Resource Information Provided?: No  Discharge Location  Discharge Placement:  (used [facility] in the past and [facility] for Nebulizer)    Reason for Admission:   CHF Acute on Chronic                  RRAT Score:13                  Do you (patient/family) have any concerns for transition/discharge?     none              Plan for utilizing home health:    TBD     Likelihood of readmission?    Moderate due to CHF            Transition of Care Plan:      TBD, the patient is on 2L of 02 now, but not at home

## 2018-07-05 NOTE — PROGRESS NOTES
Hospitalist Progress Note  Rob Man MD  Answering service: [phone] OR [phone] from in house phone  Cell: [phone]      Date of Service:  2018  NAME:  Nuzhat Cedillo  :  1955  MRN:  [id number]      Admission Summary:     Ms. Ottoniel Clay is a 44-year-old woman with a past medical history significant for congestive heart failure, recent diagnosis of COPD, hypertension, type 2 diabetes, coronary artery disease, status post stent placement, pulmonary nodules, dyslipidemia and suspected obstructive sleep apnea, was in her usual state of health until a few days ago, when the patient developed shortness of breath. The shortness of breath is progressive, associated with a fever and cough. The cough is productive of yellowish sputum. The shortness of breath is with little or no exertion. The fever is not associated with rigors and chills. The patient was last admitted to this hospital from 03/15/2018 through 2018. She was admitted with shortness of breath. The shortness of breath was attributed to suspected COPD. The patient was treated. During that hospitalization, a CT scan of the chest was obtained. The CT scan shows pulmonary nodules. The patient has since had a followup by the pulmonologist, in which the COPD was confirmed with PFTs. Interval history / Subjective: This AM patient seen with SOB, stating she just went to the bathroom and got \"winded. \" Continues to wheeze. No F/C. Needing supplemental O2. Assessment & Plan:     COPD with acute exacerbation:   -Duo-nebs scheduled  -Steroids changed to IV by Pulmonary.  Will need taper  -continue Brovana and Pulmicort  -continue Levaquin    Chronic diastolic congestive heart failure, NYHA Class III-IV on admission:  -Continue IV Lasix for diuresis  -continue beta blocker and ACE   -ECHO 3/2018 with EF of 60%  -Cardology consulted; not thought to be etiology for her symptoms  -appreciate further recs    ? GERD: Follow-up esophagram.     Elevated d-dimer: Patient with negative dopplers for DVT. -CTA roday to rule out PE    Hypertension: Continue home medications. Type 2 diabetes with hyperglycemia: Continue insulin. Hypokalemia, replete as needed. Hypocalcemia, ionized level WNL. Anemia. This is due to chronic disease. Acute renal insuffiency, not failure, improving. Suspected obstructive sleep apnea. This was suspected during the last hospitalization. The patient has since been seen by Pulmonologist in the office. -?sleep study done   -will need outpatient follow-up    Coronary artery disease, status post stent placement. Pulmonary ground glass opacities seen on imaging. Interval increase.  -Pulmonary following   -no need to get outpatient scan again for now      Acute respiratory failure with hypoxia: This is due to multiple etiological factors, including congestive heart failure and chronic obstructive pulmonary disease. Dyslipidemia: Continue statin. Leg swelling: Dopplers negative. Code status: FULL  DVT prophylaxis: heparin    Care Plan discussed with: Patient/Family and Nurse  Disposition: Home w/Family and TBD     Hospital Problems  Date Reviewed: 7/2/2018          Codes Class Noted POA    * (Principal)CHF, acute on chronic ([facility] [address] ICD-10-CM: I50.9  ICD-9-CM: 428.0  7/2/2018 Yes              Review of Systems:   Pertinent items are noted in HPI. Vital Signs:    Last 24hrs VS reviewed since prior progress note.  Most recent are:  Visit Vitals    BP (!) 151/92 (BP 1 Location: Right arm, BP Patient Position: At rest)    Pulse 94    Temp 97.7 °F (36.5 °C)    Resp 16    Ht 5' 4\" (1.626 m)    Wt 94.4 kg (208 lb 1.8 oz)    SpO2 98%    BMI 35.72 kg/m2       No intake or output data in the 24 hours ending 07/05/18 0859     Physical Examination:       Constitutional:  No acute distress, cooperative, pleasant    ENT:  Neck supple Resp:  Coarse sounds and wheezing bilaterally   CV:  Regular rhythm, normal rate    GI:  Soft, non distended, non tender. Musculoskeletal:  1+ edema    Neurologic:  Moves all extremities. Non-focal        Data Review:    Review and/or order of clinical lab test  Review and/or order of tests in the radiology section of CPT  Review and/or order of tests in the medicine section of [facility]    Labs:     Recent Labs      07/05/18   0529  07/04/18   0236   WBC  8.3  6.2   HGB  11.6  11.3*   HCT  35.6  34.2*   PLT  339  300     Recent Labs      07/05/18   0529  07/04/18   0236   NA  139  137   K  3.5  3.7   CL  104  103   CO2  26  26   BUN  36*  28*   CREA  1.13*  1.35*   GLU  182*  216*   CA  8.0*  8.4*     No results for input(s): SGOT, GPT, ALT, AP, TBIL, TBILI, TP, ALB, GLOB, GGT, AML, LPSE in the last 72 hours. No lab exists for component: AMYP, HLPSE  No results for input(s): INR, PTP, APTT in the last 72 hours. No lab exists for component: INREXT, INREXT   No results for input(s): FE, TIBC, PSAT, FERR in the last 72 hours. Lab Results   Component Value Date/Time    Folate 7.3 07/02/2018 04:27 AM      No results for input(s): PH, PCO2, PO2 in the last 72 hours.   Recent Labs      07/02/18   1740  07/02/18   1158   CPK   --   684*   CKNDX   --   0.8   TROIQ  <0.05  <0.05     Lab Results   Component Value Date/Time    Cholesterol, total 260 (H) 07/02/2018 04:27 AM    HDL Cholesterol 31 07/02/2018 04:27 AM    LDL, calculated 197.2 (H) 07/02/2018 04:27 AM    Triglyceride 159 (H) 07/02/2018 04:27 AM    CHOL/HDL Ratio 8.4 (H) 07/02/2018 04:27 AM     Lab Results   Component Value Date/Time    Glucose (POC) 195 (H) 07/05/2018 06:10 AM    Glucose (POC) 191 (H) 07/04/2018 09:04 PM    Glucose (POC) 223 (H) 07/04/2018 03:52 PM    Glucose (POC) 259 (H) 07/04/2018 11:23 AM    Glucose (POC) 252 (H) 07/04/2018 06:24 AM     Lab Results   Component Value Date/Time    Color YELLOW/STRAW 07/02/2018 12:05 PM    Appearance CLEAR 07/02/2018 12:05 PM    Specific gravity 1.011 07/02/2018 12:05 PM    Specific gravity 1.010 06/08/2011 12:00 AM    pH (UA) 5.0 07/02/2018 12:05 PM    Protein 300 (A) 07/02/2018 12:05 PM    Glucose >1000 (A) 07/02/2018 12:05 PM    Ketone NEGATIVE  07/02/2018 12:05 PM    Bilirubin NEGATIVE  07/02/2018 12:05 PM    Urobilinogen 0.2 07/02/2018 12:05 PM    Nitrites NEGATIVE  07/02/2018 12:05 PM    Leukocyte Esterase NEGATIVE  07/02/2018 12:05 PM    Epithelial cells FEW 07/02/2018 12:05 PM    Bacteria NEGATIVE  07/02/2018 12:05 PM    WBC 0-4 07/02/2018 12:05 PM    RBC 0-5 07/02/2018 12:05 PM         Medications Reviewed:     Current Facility-Administered Medications   Medication Dose Route Frequency    albuterol-ipratropium (DUO-NEB) 2.5 MG-0.5 MG/3 ML  3 mL Nebulization Q4H RT    methylPREDNISolone (PF) (SOLU-MEDROL) injection 40 mg  40 mg IntraVENous Q8H    metoprolol succinate (TOPROL-XL) XL tablet 100 mg  100 mg Oral DAILY    insulin lispro (HUMALOG) injection 6 Units  6 Units SubCUTAneous TIDAC    aspirin (ASPIRIN) tablet 325 mg  325 mg Oral DAILY    montelukast (SINGULAIR) tablet 10 mg  10 mg Oral QHS    sodium chloride (NS) flush 5-10 mL  5-10 mL IntraVENous Q8H    sodium chloride (NS) flush 5-10 mL  5-10 mL IntraVENous PRN    acetaminophen (TYLENOL) tablet 650 mg  650 mg Oral Q4H PRN    ondansetron (ZOFRAN) injection 4 mg  4 mg IntraVENous Q4H PRN    bisacodyl (DULCOLAX) tablet 5 mg  5 mg Oral DAILY PRN    heparin (porcine) injection 5,000 Units  5,000 Units SubCUTAneous Q8H    Saccharomyces boulardii (FLORASTOR) capsule 250 mg  250 mg Oral DAILY    levoFLOXacin (LEVAQUIN) 500 mg in D5W IVPB  500 mg IntraVENous Q24H    furosemide (LASIX) injection 40 mg  40 mg IntraVENous DAILY    insulin lispro (HUMALOG) injection   SubCUTAneous AC&HS    glucose chewable tablet 16 g  4 Tab Oral PRN    dextrose (D50W) injection syrg 12.5-25 g  12.5-25 g IntraVENous PRN    glucagon (GLUCAGEN) injection 1 mg  1 mg IntraMUSCular PRN    arformoterol (BROVANA) neb solution 15 mcg  15 mcg Nebulization BID RT    And    budesonide (PULMICORT) 500 mcg/2 ml nebulizer suspension  500 mcg Nebulization BID RT    insulin glargine (LANTUS) injection 60 Units  60 Units SubCUTAneous DAILY    rosuvastatin (CRESTOR) tablet 20 mg  20 mg Oral QHS    lisinopril (PRINIVIL, ZESTRIL) tablet 40 mg  40 mg Oral DAILY    insulin glargine (LANTUS) injection 50 Units  50 Units SubCUTAneous QHS     ______________________________________________________________________  EXPECTED LENGTH OF STAY: 4d 12h  ACTUAL LENGTH OF STAY:          3                 Elle Raya MD

## 2018-07-06 LAB
A FUMIGATUS1 AB SER QL ID: NEGATIVE
A PULLULANS AB SER QL: NEGATIVE
ANA SER QL: NEGATIVE
ANION GAP SERPL CALC-SCNC: 10 MMOL/L (ref 5–15)
BASOPHILS # BLD: 0 K/UL (ref 0–0.1)
BASOPHILS NFR BLD: 0 % (ref 0–1)
BUN SERPL-MCNC: 30 MG/DL (ref 6–20)
BUN/CREAT SERPL: 31 (ref 12–20)
CALCIUM SERPL-MCNC: 8.1 MG/DL (ref 8.5–10.1)
CHLORIDE SERPL-SCNC: 104 MMOL/L (ref 97–108)
CO2 SERPL-SCNC: 28 MMOL/L (ref 21–32)
CREAT SERPL-MCNC: 0.97 MG/DL (ref 0.55–1.02)
DIFFERENTIAL METHOD BLD: ABNORMAL
EOSINOPHIL # BLD: 0 K/UL (ref 0–0.4)
EOSINOPHIL NFR BLD: 0 % (ref 0–7)
ERYTHROCYTE [DISTWIDTH] IN BLOOD BY AUTOMATED COUNT: 14.4 % (ref 11.5–14.5)
GLUCOSE BLD STRIP.AUTO-MCNC: 106 MG/DL (ref 65–100)
GLUCOSE BLD STRIP.AUTO-MCNC: 148 MG/DL (ref 65–100)
GLUCOSE BLD STRIP.AUTO-MCNC: 206 MG/DL (ref 65–100)
GLUCOSE BLD STRIP.AUTO-MCNC: 80 MG/DL (ref 65–100)
GLUCOSE SERPL-MCNC: 106 MG/DL (ref 65–100)
HCT VFR BLD AUTO: 35.5 % (ref 35–47)
HGB BLD-MCNC: 11.8 G/DL (ref 11.5–16)
IMM GRANULOCYTES # BLD: 0.2 K/UL
IMM GRANULOCYTES NFR BLD AUTO: 2 %
LACEYELLA SACCHARI AB SER QL: NEGATIVE
LYMPHOCYTES # BLD: 4 K/UL (ref 0.8–3.5)
LYMPHOCYTES NFR BLD: 34 % (ref 12–49)
MCH RBC QN AUTO: 31.6 PG (ref 26–34)
MCHC RBC AUTO-ENTMCNC: 33.2 G/DL (ref 30–36.5)
MCV RBC AUTO: 95.2 FL (ref 80–99)
MONOCYTES # BLD: 0.7 K/UL (ref 0–1)
MONOCYTES NFR BLD: 6 % (ref 5–13)
NEUTS SEG # BLD: 6.8 K/UL (ref 1.8–8)
NEUTS SEG NFR BLD: 58 % (ref 32–75)
NRBC # BLD: 0 K/UL (ref 0–0.01)
NRBC BLD-RTO: 0 PER 100 WBC
PIGEON SERUM AB QL ID: NEGATIVE
PLATELET # BLD AUTO: 349 K/UL (ref 150–400)
PMV BLD AUTO: 9.6 FL (ref 8.9–12.9)
POTASSIUM SERPL-SCNC: 3.1 MMOL/L (ref 3.5–5.1)
RBC # BLD AUTO: 3.73 M/UL (ref 3.8–5.2)
RBC MORPH BLD: ABNORMAL
S RECTIVIRGULA AB SER QL ID: NEGATIVE
SEE BELOW:, 164879: NORMAL
SERVICE CMNT-IMP: ABNORMAL
SERVICE CMNT-IMP: NORMAL
SODIUM SERPL-SCNC: 142 MMOL/L (ref 136–145)
T VULGARIS AB SER QL ID: NEGATIVE
WBC # BLD AUTO: 11.7 K/UL (ref 3.6–11)

## 2018-07-06 PROCEDURE — 97116 GAIT TRAINING THERAPY: CPT

## 2018-07-06 PROCEDURE — 74011250636 HC RX REV CODE- 250/636: Performed by: INTERNAL MEDICINE

## 2018-07-06 PROCEDURE — 74011636637 HC RX REV CODE- 636/637: Performed by: INTERNAL MEDICINE

## 2018-07-06 PROCEDURE — 74011000250 HC RX REV CODE- 250: Performed by: INTERNAL MEDICINE

## 2018-07-06 PROCEDURE — 74011250637 HC RX REV CODE- 250/637: Performed by: INTERNAL MEDICINE

## 2018-07-06 PROCEDURE — 65660000000 HC RM CCU STEPDOWN

## 2018-07-06 PROCEDURE — 77010033678 HC OXYGEN DAILY

## 2018-07-06 PROCEDURE — 80048 BASIC METABOLIC PNL TOTAL CA: CPT | Performed by: INTERNAL MEDICINE

## 2018-07-06 PROCEDURE — 82962 GLUCOSE BLOOD TEST: CPT

## 2018-07-06 PROCEDURE — 97161 PT EVAL LOW COMPLEX 20 MIN: CPT

## 2018-07-06 PROCEDURE — 36415 COLL VENOUS BLD VENIPUNCTURE: CPT | Performed by: INTERNAL MEDICINE

## 2018-07-06 PROCEDURE — 94640 AIRWAY INHALATION TREATMENT: CPT

## 2018-07-06 PROCEDURE — 94760 N-INVAS EAR/PLS OXIMETRY 1: CPT

## 2018-07-06 PROCEDURE — 85025 COMPLETE CBC W/AUTO DIFF WBC: CPT | Performed by: INTERNAL MEDICINE

## 2018-07-06 RX ORDER — POTASSIUM CHLORIDE 750 MG/1
40 TABLET, FILM COATED, EXTENDED RELEASE ORAL ONCE
Status: COMPLETED | OUTPATIENT
Start: 2018-07-06 | End: 2018-07-06

## 2018-07-06 RX ORDER — NIFEDIPINE 60 MG/1
60 TABLET, EXTENDED RELEASE ORAL DAILY
Status: DISCONTINUED | OUTPATIENT
Start: 2018-07-06 | End: 2018-07-09 | Stop reason: HOSPADM

## 2018-07-06 RX ORDER — PREDNISONE 20 MG/1
40 TABLET ORAL
Status: DISCONTINUED | OUTPATIENT
Start: 2018-07-06 | End: 2018-07-09

## 2018-07-06 RX ORDER — INSULIN GLARGINE 100 [IU]/ML
40 INJECTION, SOLUTION SUBCUTANEOUS
Status: DISCONTINUED | OUTPATIENT
Start: 2018-07-06 | End: 2018-07-09 | Stop reason: HOSPADM

## 2018-07-06 RX ORDER — INSULIN GLARGINE 100 [IU]/ML
50 INJECTION, SOLUTION SUBCUTANEOUS DAILY
Status: DISCONTINUED | OUTPATIENT
Start: 2018-07-07 | End: 2018-07-09 | Stop reason: HOSPADM

## 2018-07-06 RX ADMIN — IPRATROPIUM BROMIDE AND ALBUTEROL SULFATE 3 ML: .5; 3 SOLUTION RESPIRATORY (INHALATION) at 13:29

## 2018-07-06 RX ADMIN — IPRATROPIUM BROMIDE AND ALBUTEROL SULFATE 3 ML: .5; 3 SOLUTION RESPIRATORY (INHALATION) at 05:44

## 2018-07-06 RX ADMIN — FUROSEMIDE 40 MG: 10 INJECTION, SOLUTION INTRAMUSCULAR; INTRAVENOUS at 09:49

## 2018-07-06 RX ADMIN — RDII 250 MG CAPSULE 250 MG: at 08:34

## 2018-07-06 RX ADMIN — LISINOPRIL 40 MG: 20 TABLET ORAL at 08:33

## 2018-07-06 RX ADMIN — ROSUVASTATIN CALCIUM 20 MG: 10 TABLET, FILM COATED ORAL at 22:40

## 2018-07-06 RX ADMIN — METOPROLOL SUCCINATE 100 MG: 50 TABLET, EXTENDED RELEASE ORAL at 08:33

## 2018-07-06 RX ADMIN — IPRATROPIUM BROMIDE AND ALBUTEROL SULFATE 3 ML: .5; 3 SOLUTION RESPIRATORY (INHALATION) at 16:54

## 2018-07-06 RX ADMIN — IPRATROPIUM BROMIDE AND ALBUTEROL SULFATE 3 ML: .5; 3 SOLUTION RESPIRATORY (INHALATION) at 20:42

## 2018-07-06 RX ADMIN — INSULIN GLARGINE 60 UNITS: 100 INJECTION, SOLUTION SUBCUTANEOUS at 08:34

## 2018-07-06 RX ADMIN — MONTELUKAST SODIUM 10 MG: 10 TABLET, FILM COATED ORAL at 22:40

## 2018-07-06 RX ADMIN — INSULIN GLARGINE 40 UNITS: 100 INJECTION, SOLUTION SUBCUTANEOUS at 22:40

## 2018-07-06 RX ADMIN — Medication 10 ML: at 05:00

## 2018-07-06 RX ADMIN — IPRATROPIUM BROMIDE AND ALBUTEROL SULFATE 3 ML: .5; 3 SOLUTION RESPIRATORY (INHALATION) at 09:57

## 2018-07-06 RX ADMIN — HEPARIN SODIUM 5000 UNITS: 5000 INJECTION INTRAVENOUS; SUBCUTANEOUS at 05:00

## 2018-07-06 RX ADMIN — Medication 10 ML: at 22:00

## 2018-07-06 RX ADMIN — INSULIN LISPRO 6 UNITS: 100 INJECTION, SOLUTION INTRAVENOUS; SUBCUTANEOUS at 16:30

## 2018-07-06 RX ADMIN — BUDESONIDE 500 MCG: 0.5 INHALANT RESPIRATORY (INHALATION) at 20:42

## 2018-07-06 RX ADMIN — HEPARIN SODIUM 5000 UNITS: 5000 INJECTION INTRAVENOUS; SUBCUTANEOUS at 22:41

## 2018-07-06 RX ADMIN — IPRATROPIUM BROMIDE AND ALBUTEROL SULFATE 3 ML: .5; 3 SOLUTION RESPIRATORY (INHALATION) at 23:33

## 2018-07-06 RX ADMIN — POTASSIUM CHLORIDE 40 MEQ: 750 TABLET, EXTENDED RELEASE ORAL at 09:49

## 2018-07-06 RX ADMIN — NIFEDIPINE 60 MG: 60 TABLET, FILM COATED, EXTENDED RELEASE ORAL at 09:49

## 2018-07-06 RX ADMIN — BUDESONIDE 500 MCG: 0.5 INHALANT RESPIRATORY (INHALATION) at 09:57

## 2018-07-06 RX ADMIN — Medication 10 ML: at 13:37

## 2018-07-06 RX ADMIN — INSULIN LISPRO 6 UNITS: 100 INJECTION, SOLUTION INTRAVENOUS; SUBCUTANEOUS at 12:51

## 2018-07-06 RX ADMIN — HEPARIN SODIUM 5000 UNITS: 5000 INJECTION INTRAVENOUS; SUBCUTANEOUS at 12:51

## 2018-07-06 RX ADMIN — INSULIN LISPRO 6 UNITS: 100 INJECTION, SOLUTION INTRAVENOUS; SUBCUTANEOUS at 07:48

## 2018-07-06 RX ADMIN — PREDNISONE 40 MG: 20 TABLET ORAL at 08:32

## 2018-07-06 RX ADMIN — INSULIN LISPRO 2 UNITS: 100 INJECTION, SOLUTION INTRAVENOUS; SUBCUTANEOUS at 16:30

## 2018-07-06 RX ADMIN — ASPIRIN 325 MG: 325 TABLET ORAL at 08:33

## 2018-07-06 RX ADMIN — LEVOFLOXACIN 500 MG: 5 INJECTION, SOLUTION INTRAVENOUS at 05:00

## 2018-07-06 RX ADMIN — INSULIN LISPRO 2 UNITS: 100 INJECTION, SOLUTION INTRAVENOUS; SUBCUTANEOUS at 22:40

## 2018-07-06 NOTE — PROGRESS NOTES
Paged provider due to patient BP in the 170s. No complaints of severe pain, headache, or blurry vision. Pt also requesting something for sleep. Per provider recheck BP in one hour and will reevaluate then. No sleeping med's at this time due to fragile respiratory status and Pt scheduled to receive more med tonight.

## 2018-07-06 NOTE — PROGRESS NOTES
Spiritual Care Partner Volunteer visited patient in [facility] on 7/6/18. Documented by:   Chaplain Teran MDiv, MACE  [phone] PRAY [phone])

## 2018-07-06 NOTE — PROGRESS NOTES
Pulmonary, Critical Care, and Sleep Medicine~Progress Note    Name: Johan Lozoya MRN: [id number]   : 1955 Hospital: [address]   Date: 2018 9:48 AM Admission: 2018      IMPRESSION:   1. Acute bronchospasm; improving   2. Abnml chest  CT- stable SC REBEKAH and patchy GG changes- persistent/evolving R>L (c/w 3/2018)  3. ? Aspiration/nocturnal reflux event trigger # 1/2  4. COPD/asthma  5. 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 CAD with prior stent  7. ?LAKIA - obese/ snorer /class 3 airway/ + family history       RECOMMENDATIONS:   · Continue current pulm tx  · Rapid pred taper; per Dr Zi Andrews  · Continue course abx for now  · DVT proph   · inflammatory markers- ACE normal/DAVID pending /CRP elevated/RF normal/ HP panel was not ordered   · Obtain PSG as an outpatient - suspect LAKIA  · Esophogram for GERD was unimpressive   · PRN over the weekend   ·  would cancel and reschedule the f/u CT that was scheduled for later in the month~ defer to Dr Zi Andrews      Daily Progression:    Feeling better. .     I have reviewed the labs and previous days notes. Pertinent items are noted in HPI.     OBJECTIVE:     Vital Signs:       Visit Vitals    /82    Pulse 86    Temp 98.5 °F (36.9 °C)    Resp 16    Ht 5' 4\" (1.626 m)    Wt 95.1 kg (209 lb 10.5 oz)    SpO2 93%    BMI 35.99 kg/m2      Temp (24hrs), Av °F (36.7 °C), Min:97.6 °F (36.4 °C), Max:98.5 °F (36.9 °C)     Intake/Output:     Last shift:      Last 3 shifts:        No intake or output data in the 24 hours ending 18 0950      Physical Exam:                                        Exam Findings Other   General: No resp distress noted, appears stated age    HEENT:  No ulcers, JVD not elevated, no cervical LAD    Chest: No pectus deformity, normal chest rise b/l    HEART:  RRR, no murmurs/rubs/gallops    Lungs:  CTA b/l, no rhonchi/crackles/wheeze, diminished BS at bases    ABD: Soft/NT, non rigid mildly distended    EXT: No cyanosis/clubbing/edema, normal peripheral pulses    Skin: No rashes or ulcers, no mottling    Neuro: A/O x 3        Medications:  Current Facility-Administered Medications   Medication Dose Route Frequency    NIFEdipine ER (PROCARDIA XL) tablet 60 mg  60 mg Oral DAILY    predniSONE (DELTASONE) tablet 40 mg  40 mg Oral DAILY WITH BREAKFAST    albuterol-ipratropium (DUO-NEB) 2.5 MG-0.5 MG/3 ML  3 mL Nebulization Q4H RT    metoprolol succinate (TOPROL-XL) XL tablet 100 mg  100 mg Oral DAILY    insulin lispro (HUMALOG) injection 6 Units  6 Units SubCUTAneous TIDAC    aspirin (ASPIRIN) tablet 325 mg  325 mg Oral DAILY    montelukast (SINGULAIR) tablet 10 mg  10 mg Oral QHS    sodium chloride (NS) flush 5-10 mL  5-10 mL IntraVENous Q8H    sodium chloride (NS) flush 5-10 mL  5-10 mL IntraVENous PRN    acetaminophen (TYLENOL) tablet 650 mg  650 mg Oral Q4H PRN    ondansetron (ZOFRAN) injection 4 mg  4 mg IntraVENous Q4H PRN    bisacodyl (DULCOLAX) tablet 5 mg  5 mg Oral DAILY PRN    heparin (porcine) injection 5,000 Units  5,000 Units SubCUTAneous Q8H    Saccharomyces boulardii (FLORASTOR) capsule 250 mg  250 mg Oral DAILY    levoFLOXacin (LEVAQUIN) 500 mg in D5W IVPB  500 mg IntraVENous Q24H    furosemide (LASIX) injection 40 mg  40 mg IntraVENous DAILY    insulin lispro (HUMALOG) injection   SubCUTAneous AC&HS    glucose chewable tablet 16 g  4 Tab Oral PRN    dextrose (D50W) injection syrg 12.5-25 g  12.5-25 g IntraVENous PRN    glucagon (GLUCAGEN) injection 1 mg  1 mg IntraMUSCular PRN    arformoterol (BROVANA) neb solution 15 mcg  15 mcg Nebulization BID RT    And    budesonide (PULMICORT) 500 mcg/2 ml nebulizer suspension  500 mcg Nebulization BID RT    insulin glargine (LANTUS) injection 60 Units  60 Units SubCUTAneous DAILY    rosuvastatin (CRESTOR) tablet 20 mg  20 mg Oral QHS    lisinopril (PRINIVIL, ZESTRIL) tablet 40 mg  40 mg Oral DAILY    insulin glargine (LANTUS) injection 50 Units  50 Units SubCUTAneous QHS       Labs:  ABG No results for input(s): PHI, PCO2I, PO2I, HCO3I, SO2I, FIO2I in the last 72 hours.      CBC Recent Labs      07/06/18 0457  07/05/18   0529  07/04/18   0236   WBC  11.7*  8.3  6.2   HGB  11.8  11.6  11.3*   HCT  35.5  35.6  34.2*   PLT  349  339  300   MCV  95.2  96.5  96.1   MCH  31.6  31.4  02.2        Metabolic  Panel Recent Labs      07/06/18 0457 07/05/18   0529  07/04/18   0236   NA  142  139  137   K  3.1*  3.5  3.7   CL  104  104  103   CO2  28  26  26   GLU  106*  182*  216*   BUN  30*  36*  28*   CREA  0.97  1.13*  1.35*   CA  8.1*  8.0*  8.4*        Pertinent Labs                JAZ Ann  7/6/2018

## 2018-07-06 NOTE — PROGRESS NOTES
Problem: Mobility Impaired (Adult and Pediatric)  Goal: *Acute Goals and Plan of Care (Insert Text)  Physical Therapy Goals  Initiated 7/6/2018  1. Patient will move from supine to sit and sit to supine  in bed with independence within 7 day(s). 2.  Patient will transfer from bed to chair and chair to bed with modified independence using the least restrictive device within 7 day(s). 3.  Patient will perform sit to stand with modified independence within 7 day(s). 4.  Patient will ambulate with modified independence for 350 feet with the least restrictive device within 7 day(s). 5.  Patient will ascend/descend 3 stairs with 1 handrail(s) with modified independence within 7 day(s). physical Therapy EVALUATION  Patient: Cammie Barragan (89 y.o. female)  Date: 7/6/2018  Primary Diagnosis: CHF, acute on chronic [facility])        Precautions:        ASSESSMENT :  Based on the objective data described below, the patient presents with TIPTON but stable SpO2, diminished endurance, and impaired high level balance following admission for CHF. Prior to admission this patient was living with family and  Independent for functional mobility. Completed a 6MWT during evaluation where she was able to ambulate 450' in 5 minutes with multiple standing rest breaks. She reports a hx of both TIPTON and LE fatigue but suspect deconditioning in addition to her acute exacerbation. She requested a rollator but able to ambulate the distance above with SBA and  but no overt LOB. Recommend HHPT to improve balance and endurance within the home. Patient will benefit from skilled intervention to address the above impairments.   Patients rehabilitation potential is considered to be Good  Factors which may influence rehabilitation potential include:   [x]         None noted  []         Mental ability/status  []         Medical condition  []         Home/family situation and support systems  []         Safety awareness  []         Pain tolerance/management  []         Other:      PLAN :  Recommendations and Planned Interventions:  [x]           Bed Mobility Training             [x]    Neuromuscular Re-Education  [x]           Transfer Training                   []    Orthotic/Prosthetic Training  [x]           Gait Training                         []    Modalities  [x]           Therapeutic Exercises           []    Edema Management/Control  [x]           Therapeutic Activities            []    Patient and Family Training/Education  []           Other (comment):    Frequency/Duration: Patient will be followed by physical therapy  3 times a week to address goals. Discharge Recommendations: Home Health  Further Equipment Recommendations for Discharge: to be determined       SUBJECTIVE:   Patient stated [facility] on, I need to stop for a second.     OBJECTIVE DATA SUMMARY:   HISTORY:    Past Medical History:   Diagnosis Date    Asthma     CAD (coronary artery disease)     [facility]estive [facility] failure[facility]     COPD [facility]ch[facility] obstructive pulmonary disease) ([facility]     Diabetes ([facility] [address]     Hypertension      Past Surgical History:   Procedure Laterality Date    CARDIAC SURG PROCEDURE UNLIST      DELIVERY       HX CORONARY STENT PLACEMENT      HX HYSTERECTOMY      VASCULAR SURGERY PROCEDURE UNLIST      leg stent     Prior Level of Function/Home Situation: independent for self-care and mobility  Personal factors and/or comorbidities impacting plan of care: none noted    Home Situation  Home Environment: Private residence  # Steps to Enter: 3  Rails to Enter: Yes  One/Two Story Residence: Two story  Living Alone: No  Support Systems: Family member(s)  Patient Expects to be Discharged to[de-identified] Private residence  Current DME Used/Available at Home: None    EXAMINATION/PRESENTATION/DECISION MAKING:   Critical Behavior:  Neurologic State: Alert  Orientation Level: Appropriate for age, Oriented X4  Cognition: Appropriate decision making, Appropriate for age attention/concentration, Appropriate safety awareness, Follows commands     Hearing: Auditory  Auditory Impairment: None  Skin:    Edema: +1 pitting edema bilateral LEs  Range Of Motion:  AROM: Within functional limits                       Strength:    Strength: Within functional limits                    Tone & Sensation:   Tone: Normal              Sensation: Intact               Coordination:  Coordination: Within functional limits    Functional Mobility:  Bed Mobility:  Rolling: Supervision  Supine to Sit: Supervision        Transfers:  Sit to Stand: Modified independent  Stand to Sit: Independent                       Balance:   Sitting: Intact  Standing: Impaired  Standing - Static: Good  Standing - Dynamic : Fair  Ambulation/Gait Training:  Distance (ft): 450 Feet (ft)  Assistive Device: Gait belt  Ambulation - Level of Assistance: Stand-by assistance     Gait Description (WDL): Exceptions to WDL  Gait Abnormalities: Decreased step clearance;Shuffling gait        Base of Support: Widened     Speed/Maryann: Fluctuations   Tendency to reach for railing for balance but able to ambulate without assist         Functional Measure:  6 MWT results: (Specify if any supplemental oxygen is used, the type, pre, during and post sats.)  Distance Walked in Feet (ft): 450 ft. Assistive device used: Assistive Device: Gait belt       Normative data:   Men 39-80 years old = 1889 feet; Women 3680 years msi=9257 feet  Modified 10 point Dayna RPE scale utilized:  0 = no breathlessness at all ---> 10 = maximum exertion  Please refer to the flowsheet for any additional vital signs taken during this treatment.            Tinetti test:    Sitting Balance: 1  Arises: 2  Attempts to Rise: 1  Immediate Standing Balance: 1  Standing Balance: 1  Nudged: 1  Eyes Closed: 1  Turn 360 Degrees - Continuous/Discontinuous: 1  Turn 360 Degrees - Steady/Unsteady: 1  Sitting Down: 1  Balance Score: 11  Indication of Gait: 1  R Step Length/Height: 1  L Step Length/Height: 1  R Foot Clearance: 1  L Foot Clearance: 1  Step Symmetry: 1  Step Continuity: 1  Path: 1  Trunk: 1  Walking Time: 1  Gait Score: 10  Total Score: 21       Tinetti Test and G-code impairment scale:  Percentage of Impairment CH    0%   CI    1-19% CJ    20-39% CK    40-59% CL    60-79% CM    80-99% CN     100%   Tinetti  Score 0-28 28 23-27 17-22 12-16 6-11 1-5 0       Tinetti Tool Score Risk of Falls  <19 = High Fall Risk  19-24 = Moderate Fall Risk  25-28 = Low Fall Risk  Tinetti ME. Performance-Oriented Assessment of Mobility Problems in Elderly Patients. [address]; [id number]. (Scoring Description: PT Bulletin Feb. 10, 1993)    Older adults: Jaziel Montiel et al, 2009; n = 1000 [facility] elderly evaluated with ABC, LIANNE, ADL, and IADL)  · Mean LIANNE score for males aged 69-68 years = 26.21(3.40)  · Mean LIANNE score for females age 69-68 years = 25.16(4.30)  · Mean LIANNE score for males over 80 years = 23.29(6.02)  · Mean LIANNE score for females over 80 years = 17.20(8.32)         G codes: In compliance with CMSs Claims Based Outcome Reporting, the following G-code set was chosen for this patient based on their primary functional limitation being treated: The outcome measure chosen to determine the severity of the functional limitation was the Tinetti with a score of 21/28 which was correlated with the impairment scale.     ? Mobility - Walking and Moving Around:     - CURRENT STATUS: CJ - 20%-39% impaired, limited or restricted    - GOAL STATUS: CI - 1%-19% impaired, limited or restricted    - D/C STATUS:  ---------------To be determined---------------      Physical Therapy Evaluation Charge Determination   History Examination Presentation Decision-Making   MEDIUM  Complexity : 1-2 comorbidities / personal factors will impact the outcome/ POC  LOW Complexity : 1-2 Standardized tests and measures addressing body structure, function, activity limitation and / or participation in recreation  LOW Complexity : Stable, uncomplicated  LOW Complexity : FOTO score of       Based on the above components, the patient evaluation is determined to be of the following complexity level: LOW     Activity Tolerance:     Please refer to the flowsheet for vital signs taken during this treatment. After treatment:   []         Patient left in no apparent distress sitting up in chair  [x]         Patient left in no apparent distress in bed  [x]         Call bell left within reach  [x]         Nursing notified  []         Caregiver present  []         Bed alarm activated    COMMUNICATION/EDUCATION:   The patients plan of care was discussed with: Registered Nurse. [x]         Fall prevention education was provided and the patient/caregiver indicated understanding. [x]         Patient/family have participated as able in goal setting and plan of care. [x]         Patient/family agree to work toward stated goals and plan of care. []         Patient understands intent and goals of therapy, but is neutral about his/her participation. []         Patient is unable to participate in goal setting and plan of care.     Thank you for this referral.  Lori Ortiz, PT, DPT   Time Calculation: 28 mins

## 2018-07-06 NOTE — PROGRESS NOTES
Bedside shift change report given to Jose Ding (oncoming nurse) by Vikki (offgoing nurse). Report included the following information SBAR, Kardex, Intake/Output, MAR and Recent Results.

## 2018-07-06 NOTE — DIABETES MGMT
DTC Progress Note    Recommendations/ Comments: Chart review for Bg control. Results 100 mg/dl - 106 mg/dL. Improving as steroids tapered. Change today from Methyl Prednisone 40 mg Q 12 hours to Prednisone 40 mg ddaily. Therefore she may not require as much Lantus    If appropriate please consider  Decrease Lantus to 50 units each AM and 40 units each PM    Current hospital DM medication: Lantus 60 units each AM and 50 units each PM, lispro 6 units AC and lispro normal correction scale. Chart reviewed on Jacki Romero. Patient is a 60 y.o. female with known diabetes on 70/30 70 units BID at home. A1c:   Lab Results   Component Value Date/Time    Hemoglobin A1c 9.7 (H) 07/02/2018 04:27 AM    Hemoglobin A1c 8.5 (H) 03/16/2018 04:25 AM       Recent Glucose Results:   Lab Results   Component Value Date/Time     (H) 07/06/2018 04:57 AM    GLUCPOC 106 (H) 07/06/2018 06:37 AM    GLUCPOC 100 07/05/2018 09:25 PM    GLUCPOC 143 (H) 07/05/2018 03:50 PM        Lab Results   Component Value Date/Time    Creatinine 0.97 07/06/2018 04:57 AM     Estimated Creatinine Clearance: 66.4 mL/min (based on Cr of 0.97). Active Orders   Diet    DIET DIABETIC CONSISTENT CARB Regular        PO intake:   Patient Vitals for the past 72 hrs:   % Diet Eaten   07/04/18 0804 100 %   07/03/18 1751 100 %   07/03/18 1152 100 %       Will continue to follow as needed.     Thank you  Eunice Cuevas RN, CDE  Pager [phone]

## 2018-07-06 NOTE — PROGRESS NOTES
Hospitalist Progress Note  Jae Bejarano MD  Answering service: [phone] OR [phone] from in house phone  Cell: [phone]      Date of Service:  2018  NAME:  Bonifacio Hernandez  :  1955  MRN:  [id number]      Admission Summary:     Ms. Misty Robles is a 79-year-old woman with a past medical history significant for congestive heart failure, recent diagnosis of COPD, hypertension, type 2 diabetes, coronary artery disease, status post stent placement, pulmonary nodules, dyslipidemia and suspected obstructive sleep apnea, was in her usual state of health until a few days ago, when the patient developed shortness of breath. The shortness of breath is progressive, associated with a fever and cough. The cough is productive of yellowish sputum. The shortness of breath is with little or no exertion. The fever is not associated with rigors and chills. The patient was last admitted to this hospital from 03/15/2018 through 2018. She was admitted with shortness of breath. The shortness of breath was attributed to suspected COPD. The patient was treated. During that hospitalization, a CT scan of the chest was obtained. The CT scan shows pulmonary nodules. The patient has since had a followup by the pulmonologist, in which the COPD was confirmed with PFTs. Interval history / Subjective: This AM patient with some improvement in her wheezing and SOB. No F/C. Still needing supplemental O2. Discussed plan to work with PT today.      Assessment & Plan:     COPD with acute exacerbation:   -Duo-nebs scheduled  -Steroids changed to IV by Pulmonary, again to PO today, will taper  -continue Brovana and Pulmicort  -continue Levaquin, per Pulm    Chronic diastolic congestive heart failure, NYHA Class III-IV on admission:  -Continue IV Lasix for diuresis  -continue beta blocker and ACE   -ECHO 3/2018 with EF of 60%  -Cardology consulted; not thought to be etiology for her symptoms  -appreciate further recs    ? GERD: Esophagram unremarkable. Only mild GERD demonstrated. Elevated d-dimer: Patient with negative dopplers for DVT. -CTA yesterday was negative    Hypertension: Continue home medications. Type 2 diabetes with hyperglycemia: Continue insulin. Hypokalemia, replete as needed. Hypocalcemia, ionized level WNL. Anemia. This is due to chronic disease. Acute renal insuffiency, resolved. Suspected obstructive sleep apnea: This was suspected during the last hospitalization. The patient has since been seen by Pulmonology in the office.   -no sleep study done prior  -will need outpatient follow-up    Coronary artery disease, status post stent placement. Pulmonary ground glass opacities seen on imaging. Interval increase.  -Pulmonary following   -no need to get outpatient scan again for now      Acute respiratory failure with hypoxia: This is due to multiple etiological factors, including congestive heart failure and chronic obstructive pulmonary disease. Dyslipidemia: Continue statin. Leg swelling: Dopplers negative. Code status: FULL  DVT prophylaxis: heparin    Care Plan discussed with: Patient/Family and Nurse  Disposition: Home w/Family and TBD     Hospital Problems  Date Reviewed: 7/2/2018          Codes Class Noted POA    * (Principal)CHF, acute on chronic ([facility] [address] ICD-10-CM: I50.9  ICD-9-CM: 428.0  7/2/2018 Yes              Review of Systems:   Pertinent items are noted in HPI. Vital Signs:    Last 24hrs VS reviewed since prior progress note.  Most recent are:  Visit Vitals    BP (!) 174/98 (BP 1 Location: Right arm, BP Patient Position: At rest;Sitting)    Pulse 92    Temp 98.5 °F (36.9 °C)    Resp 16    Ht 5' 4\" (1.626 m)    Wt 95.1 kg (209 lb 10.5 oz)    SpO2 93%    BMI 35.99 kg/m2       No intake or output data in the 24 hours ending 07/06/18 0820     Physical Examination:       Constitutional:  No acute distress, cooperative, pleasant    ENT:  Neck supple   Resp:  Rhonchi and mild wheezing bilaterally, much improved   CV:  Regular rhythm, normal rate    GI:  Soft, non distended, non tender. Musculoskeletal:  Trace edema    Neurologic:  Moves all extremities. Non-focal        Data Review:    Review and/or order of clinical lab test  Review and/or order of tests in the radiology section of [facility]  Review and/or order of tests in the medicine section of [facility]    Labs:     Recent Labs      07/06/18 0457 07/05/18   0529   WBC  11.7*  8.3   HGB  11.8  11.6   HCT  35.5  35.6   PLT  349  339     Recent Labs      07/06/18   0457 07/05/18   0529  07/04/18   0236   NA  142  139  137   K  3.1*  3.5  3.7   CL  104  104  103   CO2  28  26  26   BUN  30*  36*  28*   CREA  0.97  1.13*  1.35*   GLU  106*  182*  216*   CA  8.1*  8.0*  8.4*     No results for input(s): SGOT, GPT, ALT, AP, TBIL, TBILI, TP, ALB, GLOB, GGT, AML, LPSE in the last 72 hours. No lab exists for component: AMYP, HLPSE  No results for input(s): INR, PTP, APTT in the last 72 hours. No lab exists for component: INREXT, INREXT   No results for input(s): FE, TIBC, PSAT, FERR in the last 72 hours. Lab Results   Component Value Date/Time    Folate 7.3 07/02/2018 04:27 AM      No results for input(s): PH, PCO2, PO2 in the last 72 hours. No results for input(s): CPK, CKNDX, TROIQ in the last 72 hours.     No lab exists for component: CPKMB  Lab Results   Component Value Date/Time    Cholesterol, total 260 (H) 07/02/2018 04:27 AM    HDL Cholesterol 31 07/02/2018 04:27 AM    LDL, calculated 197.2 (H) 07/02/2018 04:27 AM    Triglyceride 159 (H) 07/02/2018 04:27 AM    CHOL/HDL Ratio 8.4 (H) 07/02/2018 04:27 AM     Lab Results   Component Value Date/Time    Glucose (POC) 106 (H) 07/06/2018 06:37 AM    Glucose (POC) 100 07/05/2018 09:25 PM    Glucose (POC) 143 (H) 07/05/2018 03:50 PM    Glucose (POC) 134 (H) 07/05/2018 11:28 AM    Glucose (POC) 195 (H) 07/05/2018 06:10 AM     Lab Results   Component Value Date/Time    Color YELLOW/STRAW 07/02/2018 12:05 PM    Appearance CLEAR 07/02/2018 12:05 PM    Specific gravity 1.011 07/02/2018 12:05 PM    Specific gravity 1.010 06/08/2011 12:00 AM    pH (UA) 5.0 07/02/2018 12:05 PM    Protein 300 (A) 07/02/2018 12:05 PM    Glucose >1000 (A) 07/02/2018 12:05 PM    Ketone NEGATIVE  07/02/2018 12:05 PM    Bilirubin NEGATIVE  07/02/2018 12:05 PM    Urobilinogen 0.2 07/02/2018 12:05 PM    Nitrites NEGATIVE  07/02/2018 12:05 PM    Leukocyte Esterase NEGATIVE  07/02/2018 12:05 PM    Epithelial cells FEW 07/02/2018 12:05 PM    Bacteria NEGATIVE  07/02/2018 12:05 PM    WBC 0-4 07/02/2018 12:05 PM    RBC 0-5 07/02/2018 12:05 PM         Medications Reviewed:     Current Facility-Administered Medications   Medication Dose Route Frequency    methylPREDNISolone (PF) (SOLU-MEDROL) injection 40 mg  40 mg IntraVENous Q12H    albuterol-ipratropium (DUO-NEB) 2.5 MG-0.5 MG/3 ML  3 mL Nebulization Q4H RT    metoprolol succinate (TOPROL-XL) XL tablet 100 mg  100 mg Oral DAILY    insulin lispro (HUMALOG) injection 6 Units  6 Units SubCUTAneous TIDAC    aspirin (ASPIRIN) tablet 325 mg  325 mg Oral DAILY    montelukast (SINGULAIR) tablet 10 mg  10 mg Oral QHS    sodium chloride (NS) flush 5-10 mL  5-10 mL IntraVENous Q8H    sodium chloride (NS) flush 5-10 mL  5-10 mL IntraVENous PRN    acetaminophen (TYLENOL) tablet 650 mg  650 mg Oral Q4H PRN    ondansetron (ZOFRAN) injection 4 mg  4 mg IntraVENous Q4H PRN    bisacodyl (DULCOLAX) tablet 5 mg  5 mg Oral DAILY PRN    heparin (porcine) injection 5,000 Units  5,000 Units SubCUTAneous Q8H    Saccharomyces boulardii (FLORASTOR) capsule 250 mg  250 mg Oral DAILY    levoFLOXacin (LEVAQUIN) 500 mg in D5W IVPB  500 mg IntraVENous Q24H    furosemide (LASIX) injection 40 mg  40 mg IntraVENous DAILY    insulin lispro (HUMALOG) injection   SubCUTAneous AC&HS    glucose chewable tablet 16 g  4 Tab Oral PRN    dextrose (D50W) injection syrg 12.5-25 g  12.5-25 g IntraVENous PRN    glucagon (GLUCAGEN) injection 1 mg  1 mg IntraMUSCular PRN    arformoterol (BROVANA) neb solution 15 mcg  15 mcg Nebulization BID RT    And    budesonide (PULMICORT) 500 mcg/2 ml nebulizer suspension  500 mcg Nebulization BID RT    insulin glargine (LANTUS) injection 60 Units  60 Units SubCUTAneous DAILY    rosuvastatin (CRESTOR) tablet 20 mg  20 mg Oral QHS    lisinopril (PRINIVIL, ZESTRIL) tablet 40 mg  40 mg Oral DAILY    insulin glargine (LANTUS) injection 50 Units  50 Units SubCUTAneous QHS     ______________________________________________________________________  EXPECTED LENGTH OF STAY: 4d 12h  ACTUAL LENGTH OF STAY:          [address] MD

## 2018-07-07 LAB
ANION GAP SERPL CALC-SCNC: 10 MMOL/L (ref 5–15)
BUN SERPL-MCNC: 33 MG/DL (ref 6–20)
BUN/CREAT SERPL: 28 (ref 12–20)
CALCIUM SERPL-MCNC: 8.2 MG/DL (ref 8.5–10.1)
CHLORIDE SERPL-SCNC: 103 MMOL/L (ref 97–108)
CO2 SERPL-SCNC: 29 MMOL/L (ref 21–32)
CREAT SERPL-MCNC: 1.16 MG/DL (ref 0.55–1.02)
GLUCOSE BLD STRIP.AUTO-MCNC: 100 MG/DL (ref 65–100)
GLUCOSE BLD STRIP.AUTO-MCNC: 122 MG/DL (ref 65–100)
GLUCOSE BLD STRIP.AUTO-MCNC: 135 MG/DL (ref 65–100)
GLUCOSE BLD STRIP.AUTO-MCNC: 144 MG/DL (ref 65–100)
GLUCOSE SERPL-MCNC: 148 MG/DL (ref 65–100)
MAGNESIUM SERPL-MCNC: 2.4 MG/DL (ref 1.6–2.4)
POTASSIUM SERPL-SCNC: 3.3 MMOL/L (ref 3.5–5.1)
SERVICE CMNT-IMP: ABNORMAL
SERVICE CMNT-IMP: NORMAL
SODIUM SERPL-SCNC: 142 MMOL/L (ref 136–145)

## 2018-07-07 PROCEDURE — 74011250637 HC RX REV CODE- 250/637: Performed by: INTERNAL MEDICINE

## 2018-07-07 PROCEDURE — 74011636637 HC RX REV CODE- 636/637: Performed by: INTERNAL MEDICINE

## 2018-07-07 PROCEDURE — 65660000000 HC RM CCU STEPDOWN

## 2018-07-07 PROCEDURE — 94664 DEMO&/EVAL PT USE INHALER: CPT

## 2018-07-07 PROCEDURE — 74011250636 HC RX REV CODE- 250/636: Performed by: INTERNAL MEDICINE

## 2018-07-07 PROCEDURE — 94640 AIRWAY INHALATION TREATMENT: CPT

## 2018-07-07 PROCEDURE — 74011000250 HC RX REV CODE- 250: Performed by: INTERNAL MEDICINE

## 2018-07-07 PROCEDURE — 82962 GLUCOSE BLOOD TEST: CPT

## 2018-07-07 PROCEDURE — 74011250637 HC RX REV CODE- 250/637: Performed by: FAMILY MEDICINE

## 2018-07-07 PROCEDURE — 83735 ASSAY OF MAGNESIUM: CPT | Performed by: INTERNAL MEDICINE

## 2018-07-07 PROCEDURE — 74011250637 HC RX REV CODE- 250/637: Performed by: HOSPITALIST

## 2018-07-07 PROCEDURE — 36415 COLL VENOUS BLD VENIPUNCTURE: CPT | Performed by: INTERNAL MEDICINE

## 2018-07-07 PROCEDURE — 80048 BASIC METABOLIC PNL TOTAL CA: CPT | Performed by: INTERNAL MEDICINE

## 2018-07-07 RX ORDER — FAMOTIDINE 20 MG/1
20 TABLET, FILM COATED ORAL 2 TIMES DAILY
Status: DISCONTINUED | OUTPATIENT
Start: 2018-07-08 | End: 2018-07-07

## 2018-07-07 RX ORDER — POTASSIUM CHLORIDE 20MEQ/15ML
40 LIQUID (ML) ORAL
Status: COMPLETED | OUTPATIENT
Start: 2018-07-07 | End: 2018-07-07

## 2018-07-07 RX ORDER — CALCIUM CARBONATE 200(500)MG
200 TABLET,CHEWABLE ORAL ONCE
Status: COMPLETED | OUTPATIENT
Start: 2018-07-07 | End: 2018-07-07

## 2018-07-07 RX ORDER — BUMETANIDE 1 MG/1
0.5 TABLET ORAL 2 TIMES DAILY
Status: DISCONTINUED | OUTPATIENT
Start: 2018-07-07 | End: 2018-07-09 | Stop reason: HOSPADM

## 2018-07-07 RX ORDER — IPRATROPIUM BROMIDE AND ALBUTEROL SULFATE 2.5; .5 MG/3ML; MG/3ML
3 SOLUTION RESPIRATORY (INHALATION)
Status: DISCONTINUED | OUTPATIENT
Start: 2018-07-07 | End: 2018-07-09 | Stop reason: HOSPADM

## 2018-07-07 RX ORDER — FAMOTIDINE 20 MG/1
20 TABLET, FILM COATED ORAL 2 TIMES DAILY
Status: DISCONTINUED | OUTPATIENT
Start: 2018-07-07 | End: 2018-07-09 | Stop reason: HOSPADM

## 2018-07-07 RX ADMIN — HEPARIN SODIUM 5000 UNITS: 5000 INJECTION INTRAVENOUS; SUBCUTANEOUS at 04:35

## 2018-07-07 RX ADMIN — BUMETANIDE 0.5 MG: 1 TABLET ORAL at 17:39

## 2018-07-07 RX ADMIN — INSULIN GLARGINE 50 UNITS: 100 INJECTION, SOLUTION SUBCUTANEOUS at 09:35

## 2018-07-07 RX ADMIN — INSULIN GLARGINE 40 UNITS: 100 INJECTION, SOLUTION SUBCUTANEOUS at 22:41

## 2018-07-07 RX ADMIN — ASPIRIN 325 MG: 325 TABLET ORAL at 09:38

## 2018-07-07 RX ADMIN — NIFEDIPINE 60 MG: 60 TABLET, FILM COATED, EXTENDED RELEASE ORAL at 09:37

## 2018-07-07 RX ADMIN — IPRATROPIUM BROMIDE AND ALBUTEROL SULFATE 3 ML: .5; 3 SOLUTION RESPIRATORY (INHALATION) at 03:49

## 2018-07-07 RX ADMIN — BUDESONIDE 500 MCG: 0.5 INHALANT RESPIRATORY (INHALATION) at 08:49

## 2018-07-07 RX ADMIN — HEPARIN SODIUM 5000 UNITS: 5000 INJECTION INTRAVENOUS; SUBCUTANEOUS at 15:11

## 2018-07-07 RX ADMIN — INSULIN LISPRO 2 UNITS: 100 INJECTION, SOLUTION INTRAVENOUS; SUBCUTANEOUS at 17:40

## 2018-07-07 RX ADMIN — FAMOTIDINE 20 MG: 20 TABLET ORAL at 22:40

## 2018-07-07 RX ADMIN — Medication 10 ML: at 15:11

## 2018-07-07 RX ADMIN — BUDESONIDE 500 MCG: 0.5 INHALANT RESPIRATORY (INHALATION) at 19:42

## 2018-07-07 RX ADMIN — ROSUVASTATIN CALCIUM 20 MG: 10 TABLET, FILM COATED ORAL at 22:04

## 2018-07-07 RX ADMIN — IPRATROPIUM BROMIDE AND ALBUTEROL SULFATE 3 ML: .5; 3 SOLUTION RESPIRATORY (INHALATION) at 08:49

## 2018-07-07 RX ADMIN — POTASSIUM CHLORIDE 40 MEQ: 20 SOLUTION ORAL at 09:37

## 2018-07-07 RX ADMIN — BUMETANIDE 0.5 MG: 1 TABLET ORAL at 09:37

## 2018-07-07 RX ADMIN — RDII 250 MG CAPSULE 250 MG: at 09:37

## 2018-07-07 RX ADMIN — LEVOFLOXACIN 500 MG: 5 INJECTION, SOLUTION INTRAVENOUS at 04:35

## 2018-07-07 RX ADMIN — INSULIN LISPRO 6 UNITS: 100 INJECTION, SOLUTION INTRAVENOUS; SUBCUTANEOUS at 17:39

## 2018-07-07 RX ADMIN — IPRATROPIUM BROMIDE AND ALBUTEROL SULFATE 3 ML: .5; 3 SOLUTION RESPIRATORY (INHALATION) at 19:40

## 2018-07-07 RX ADMIN — METOPROLOL SUCCINATE 100 MG: 50 TABLET, EXTENDED RELEASE ORAL at 09:38

## 2018-07-07 RX ADMIN — CALCIUM CARBONATE (ANTACID) CHEW TAB 500 MG 200 MG: 500 CHEW TAB at 05:57

## 2018-07-07 RX ADMIN — MONTELUKAST SODIUM 10 MG: 10 TABLET, FILM COATED ORAL at 22:04

## 2018-07-07 RX ADMIN — INSULIN LISPRO 6 UNITS: 100 INJECTION, SOLUTION INTRAVENOUS; SUBCUTANEOUS at 09:35

## 2018-07-07 RX ADMIN — LISINOPRIL 40 MG: 20 TABLET ORAL at 09:37

## 2018-07-07 RX ADMIN — Medication 10 ML: at 04:35

## 2018-07-07 RX ADMIN — INSULIN LISPRO 6 UNITS: 100 INJECTION, SOLUTION INTRAVENOUS; SUBCUTANEOUS at 12:52

## 2018-07-07 RX ADMIN — Medication 10 ML: at 22:19

## 2018-07-07 RX ADMIN — PREDNISONE 40 MG: 20 TABLET ORAL at 09:37

## 2018-07-07 RX ADMIN — HEPARIN SODIUM 5000 UNITS: 5000 INJECTION INTRAVENOUS; SUBCUTANEOUS at 22:04

## 2018-07-07 NOTE — ROUTINE PROCESS
On call MD paged for patient having acid reflux symptoms. Tums ordered 1 time dose. Patient had approximately 3 packs of omkar crackers and peanut butter before bed and 2 cans of diet ginger ale.

## 2018-07-07 NOTE — PROGRESS NOTES
Bedside and Verbal shift change report given to Mai Mendoza (oncoming nurse) by Rajwinder Morejon (offgoing nurse). Report included the following information SBAR, Kardex, Intake/Output and MAR.

## 2018-07-07 NOTE — PROGRESS NOTES
Bedside shift change report given to Chapito Mike (oncoming nurse) by Paige Colvin (offgoing nurse). Report included the following information SBAR, Kardex, Intake/Output and MAR.

## 2018-07-07 NOTE — PROGRESS NOTES
Patient's daughter Jonathan Homar) says she needs help from case management with applying for medicare and medicaid. Patient apparently paying approximately $300 a month of health insurance that is not providing good coverage and patient may be about to lose her job and move in with her daughter? This is according to pt's daughter and was spoken outside of pt room. Daughter says call her if any more information needed but definitely knows her mother needs help applying for medicare and medicaid.

## 2018-07-07 NOTE — PROGRESS NOTES
Problem: Falls - Risk of  Goal: *Absence of Falls  Document Jairo Fall Risk and appropriate interventions in the flowsheet.    Fall Risk Interventions:            Medication Interventions: Teach patient to arise slowly, Patient to call before getting OOB

## 2018-07-07 NOTE — PROGRESS NOTES
Hospitalist Progress Note  Brooklyn Dupree MD  Answering service: [phone] OR [phone] from in house phone  Cell: [phone]      Date of Service:  2018  NAME:  Ceci Armstrong  :  1955  MRN:  [id number]      Admission Summary:     Ms. Ophleia Cervantes is a 76-year-old woman with a past medical history significant for congestive heart failure, recent diagnosis of COPD, hypertension, type 2 diabetes, coronary artery disease, status post stent placement, pulmonary nodules, dyslipidemia and suspected obstructive sleep apnea, was in her usual state of health until a few days ago, when the patient developed shortness of breath. The shortness of breath is progressive, associated with a fever and cough. The cough is productive of yellowish sputum. The shortness of breath is with little or no exertion. The fever is not associated with rigors and chills. The patient was last admitted to this hospital from 03/15/2018 through 2018. She was admitted with shortness of breath. The shortness of breath was attributed to suspected COPD. The patient was treated. During that hospitalization, a CT scan of the chest was obtained. The CT scan shows pulmonary nodules. The patient has since had a followup by the pulmonologist, in which the COPD was confirmed with PFTs. Interval history / Subjective: This AM patient with some improvement in her wheezing and SOB. No F/C. Still needing supplemental O2. Discussed plan to work with PT today.      Assessment & Plan:     COPD with acute exacerbation:   -Duo-nebs scheduled  -PO Steroid taper  -continue Brovana and Pulmicort  -continue Levaquin, per Pulm    Chronic diastolic congestive heart failure, NYHA Class III-IV on admission:  -Switch to home PO Bumex today given bump in cr  -continue beta blocker and ACE   -ECHO 3/2018 with EF of 60%  -Cardology consulted; not thought to be etiology for her symptoms  -appreciate further recs    GERD: Esophagram unremarkable. Only mild GERD demonstrated. Elevated d-dimer: Patient with negative dopplers for DVT. -CTA was negative    Hypertension: Continue home medications. Type 2 diabetes with hyperglycemia: Continue insulin. Hypokalemia, replete as needed. Hypocalcemia, ionized level WNL. Anemia. This is due to chronic disease. Acute renal insuffiency, resolved. Suspected obstructive sleep apnea: This was suspected during the last hospitalization. The patient has since been seen by Pulmonology in the office.   -no sleep study done prior  -will need outpatient follow-up    Coronary artery disease, status post stent placement. Pulmonary ground glass opacities seen on imaging. Interval increase.  -Pulmonary following   -no need to get outpatient scan again for now      Acute respiratory failure with hypoxia: This is due to multiple etiological factors, including congestive heart failure and chronic obstructive pulmonary disease. Dyslipidemia: Continue statin. Leg swelling: Dopplers negative. Code status: FULL  DVT prophylaxis: heparin    Care Plan discussed with: Patient/Family and Nurse  Disposition: Home w/Family and TBD, likely tomorrow if improved     Hospital Problems  Date Reviewed: 7/2/2018          Codes Class Noted POA    * (Principal)CHF, acute on chronic ([facility] [address] ICD-10-CM: I50.9  ICD-9-CM: 428.0  7/2/2018 Yes              Review of Systems:   Pertinent items are noted in HPI. Vital Signs:    Last 24hrs VS reviewed since prior progress note.  Most recent are:  Visit Vitals    /80 (BP 1 Location: Left arm, BP Patient Position: At rest)    Pulse 99    Temp 98.2 °F (36.8 °C)    Resp 16    Ht 5' 4\" (1.626 m)    Wt 94.5 kg (208 lb 5.4 oz)    SpO2 93%    BMI 35.76 kg/m2         Intake/Output Summary (Last 24 hours) at 07/07/18 0824  Last data filed at 07/07/18 0100   Gross per 24 hour   Intake             1040 ml Output                0 ml   Net             1040 ml        Physical Examination:       Constitutional:  No acute distress, cooperative, pleasant    ENT:  Neck supple   Resp:  Rhonchi and mild wheezing bilaterally, much improved   CV:  Regular rhythm, normal rate    GI:  Soft, non distended, non tender. Musculoskeletal:  Trace edema    Neurologic:  Moves all extremities. Non-focal        Data Review:    Review and/or order of clinical lab test  Review and/or order of tests in the radiology section of [facility]  Review and/or order of tests in the medicine section of [facility]    Labs:     Recent Labs      07/06/18 0457 07/05/18 0529   WBC  11.7*  8.3   HGB  11.8  11.6   HCT  35.5  35.6   PLT  349  339     Recent Labs      07/07/18 0440 07/06/18 0457 07/05/18 0529   NA  142  142  139   K  3.3*  3.1*  3.5   CL  103  104  104   CO2  29  28  26   BUN  33*  30*  36*   CREA  1.16*  0.97  1.13*   GLU  148*  106*  182*   CA  8.2*  8.1*  8.0*   MG  2.4   --    --      No results for input(s): SGOT, GPT, ALT, AP, TBIL, TBILI, TP, ALB, GLOB, GGT, AML, LPSE in the last 72 hours. No lab exists for component: AMYP, HLPSE  No results for input(s): INR, PTP, APTT in the last 72 hours. No lab exists for component: INREXT, INREXT   No results for input(s): FE, TIBC, PSAT, FERR in the last 72 hours. Lab Results   Component Value Date/Time    Folate 7.3 07/02/2018 04:27 AM      No results for input(s): PH, PCO2, PO2 in the last 72 hours. No results for input(s): CPK, CKNDX, TROIQ in the last 72 hours.     No lab exists for component: CPKMB  Lab Results   Component Value Date/Time    Cholesterol, total 260 (H) 07/02/2018 04:27 AM    HDL Cholesterol 31 07/02/2018 04:27 AM    LDL, calculated 197.2 (H) 07/02/2018 04:27 AM    Triglyceride 159 (H) 07/02/2018 04:27 AM    CHOL/HDL Ratio 8.4 (H) 07/02/2018 04:27 AM     Lab Results   Component Value Date/Time    Glucose (POC) 135 (H) 07/07/2018 06:33 AM    Glucose (POC) 206 (H) 07/06/2018 09:20 PM    Glucose (POC) 148 (H) 07/06/2018 04:37 PM    Glucose (POC) 80 07/06/2018 11:19 AM    Glucose (POC) 106 (H) 07/06/2018 06:37 AM     Lab Results   Component Value Date/Time    Color YELLOW/STRAW 07/02/2018 12:05 PM    Appearance CLEAR 07/02/2018 12:05 PM    Specific gravity 1.011 07/02/2018 12:05 PM    Specific gravity 1.010 06/08/2011 12:00 AM    pH (UA) 5.0 07/02/2018 12:05 PM    Protein 300 (A) 07/02/2018 12:05 PM    Glucose >1000 (A) 07/02/2018 12:05 PM    Ketone NEGATIVE  07/02/2018 12:05 PM    Bilirubin NEGATIVE  07/02/2018 12:05 PM    Urobilinogen 0.2 07/02/2018 12:05 PM    Nitrites NEGATIVE  07/02/2018 12:05 PM    Leukocyte Esterase NEGATIVE  07/02/2018 12:05 PM    Epithelial cells FEW 07/02/2018 12:05 PM    Bacteria NEGATIVE  07/02/2018 12:05 PM    WBC 0-4 07/02/2018 12:05 PM    RBC 0-5 07/02/2018 12:05 PM         Medications Reviewed:     Current Facility-Administered Medications   Medication Dose Route Frequency    potassium chloride (KAON 10%) 20 mEq/15 mL oral liquid 40 mEq  40 mEq Oral NOW    bumetanide (BUMEX) tablet 0.5 mg  0.5 mg Oral BID    NIFEdipine ER (PROCARDIA XL) tablet 60 mg  60 mg Oral DAILY    predniSONE (DELTASONE) tablet 40 mg  40 mg Oral DAILY WITH BREAKFAST    insulin glargine (LANTUS) injection 40 Units  40 Units SubCUTAneous QHS    insulin glargine (LANTUS) injection 50 Units  50 Units SubCUTAneous DAILY    albuterol-ipratropium (DUO-NEB) 2.5 MG-0.5 MG/3 ML  3 mL Nebulization Q4H RT    metoprolol succinate (TOPROL-XL) XL tablet 100 mg  100 mg Oral DAILY    insulin lispro (HUMALOG) injection 6 Units  6 Units SubCUTAneous TIDAC    aspirin (ASPIRIN) tablet 325 mg  325 mg Oral DAILY    montelukast (SINGULAIR) tablet 10 mg  10 mg Oral QHS    sodium chloride (NS) flush 5-10 mL  5-10 mL IntraVENous Q8H    sodium chloride (NS) flush 5-10 mL  5-10 mL IntraVENous PRN    acetaminophen (TYLENOL) tablet 650 mg  650 mg Oral Q4H PRN    ondansetron (ZOFRAN) injection 4 mg  4 mg IntraVENous Q4H PRN    bisacodyl (DULCOLAX) tablet 5 mg  5 mg Oral DAILY PRN    heparin (porcine) injection 5,000 Units  5,000 Units SubCUTAneous Q8H    Saccharomyces boulardii (FLORASTOR) capsule 250 mg  250 mg Oral DAILY    levoFLOXacin (LEVAQUIN) 500 mg in D5W IVPB  500 mg IntraVENous Q24H    insulin lispro (HUMALOG) injection   SubCUTAneous AC&HS    glucose chewable tablet 16 g  4 Tab Oral PRN    dextrose (D50W) injection syrg 12.5-25 g  12.5-25 g IntraVENous PRN    glucagon (GLUCAGEN) injection 1 mg  1 mg IntraMUSCular PRN    arformoterol (BROVANA) neb solution 15 mcg  15 mcg Nebulization BID RT    And    budesonide (PULMICORT) 500 mcg/2 ml nebulizer suspension  500 mcg Nebulization BID RT    rosuvastatin (CRESTOR) tablet 20 mg  20 mg Oral QHS    lisinopril (PRINIVIL, ZESTRIL) tablet 40 mg  40 mg Oral DAILY     ______________________________________________________________________  EXPECTED LENGTH OF STAY: 4d 12h  ACTUAL LENGTH OF STAY:          5                 Dong Solorio MD

## 2018-07-08 LAB
ANION GAP SERPL CALC-SCNC: 8 MMOL/L (ref 5–15)
BUN SERPL-MCNC: 28 MG/DL (ref 6–20)
BUN/CREAT SERPL: 28 (ref 12–20)
CALCIUM SERPL-MCNC: 8 MG/DL (ref 8.5–10.1)
CHLORIDE SERPL-SCNC: 107 MMOL/L (ref 97–108)
CO2 SERPL-SCNC: 28 MMOL/L (ref 21–32)
CREAT SERPL-MCNC: 0.99 MG/DL (ref 0.55–1.02)
GLUCOSE BLD STRIP.AUTO-MCNC: 112 MG/DL (ref 65–100)
GLUCOSE BLD STRIP.AUTO-MCNC: 186 MG/DL (ref 65–100)
GLUCOSE BLD STRIP.AUTO-MCNC: 215 MG/DL (ref 65–100)
GLUCOSE BLD STRIP.AUTO-MCNC: 72 MG/DL (ref 65–100)
GLUCOSE BLD STRIP.AUTO-MCNC: 80 MG/DL (ref 65–100)
GLUCOSE SERPL-MCNC: 66 MG/DL (ref 65–100)
MAGNESIUM SERPL-MCNC: 2.2 MG/DL (ref 1.6–2.4)
POTASSIUM SERPL-SCNC: 3.2 MMOL/L (ref 3.5–5.1)
SERVICE CMNT-IMP: ABNORMAL
SERVICE CMNT-IMP: NORMAL
SERVICE CMNT-IMP: NORMAL
SODIUM SERPL-SCNC: 143 MMOL/L (ref 136–145)

## 2018-07-08 PROCEDURE — 74011250637 HC RX REV CODE- 250/637: Performed by: INTERNAL MEDICINE

## 2018-07-08 PROCEDURE — 74011636637 HC RX REV CODE- 636/637: Performed by: INTERNAL MEDICINE

## 2018-07-08 PROCEDURE — 80048 BASIC METABOLIC PNL TOTAL CA: CPT | Performed by: INTERNAL MEDICINE

## 2018-07-08 PROCEDURE — 83735 ASSAY OF MAGNESIUM: CPT | Performed by: INTERNAL MEDICINE

## 2018-07-08 PROCEDURE — 74011000250 HC RX REV CODE- 250: Performed by: INTERNAL MEDICINE

## 2018-07-08 PROCEDURE — 36415 COLL VENOUS BLD VENIPUNCTURE: CPT | Performed by: INTERNAL MEDICINE

## 2018-07-08 PROCEDURE — 94640 AIRWAY INHALATION TREATMENT: CPT

## 2018-07-08 PROCEDURE — 65270000032 HC RM SEMIPRIVATE

## 2018-07-08 PROCEDURE — 82962 GLUCOSE BLOOD TEST: CPT

## 2018-07-08 PROCEDURE — 74011250637 HC RX REV CODE- 250/637: Performed by: HOSPITALIST

## 2018-07-08 PROCEDURE — 74011250636 HC RX REV CODE- 250/636: Performed by: INTERNAL MEDICINE

## 2018-07-08 RX ORDER — POTASSIUM CHLORIDE 20MEQ/15ML
40 LIQUID (ML) ORAL ONCE
Status: COMPLETED | OUTPATIENT
Start: 2018-07-08 | End: 2018-07-08

## 2018-07-08 RX ADMIN — BUMETANIDE 0.5 MG: 1 TABLET ORAL at 09:35

## 2018-07-08 RX ADMIN — LISINOPRIL 40 MG: 20 TABLET ORAL at 09:34

## 2018-07-08 RX ADMIN — INSULIN LISPRO 2 UNITS: 100 INJECTION, SOLUTION INTRAVENOUS; SUBCUTANEOUS at 23:13

## 2018-07-08 RX ADMIN — BUDESONIDE 500 MCG: 0.5 INHALANT RESPIRATORY (INHALATION) at 07:38

## 2018-07-08 RX ADMIN — RDII 250 MG CAPSULE 250 MG: at 09:35

## 2018-07-08 RX ADMIN — FAMOTIDINE 20 MG: 20 TABLET ORAL at 17:24

## 2018-07-08 RX ADMIN — NIFEDIPINE 60 MG: 60 TABLET, FILM COATED, EXTENDED RELEASE ORAL at 09:36

## 2018-07-08 RX ADMIN — BUDESONIDE 500 MCG: 0.5 INHALANT RESPIRATORY (INHALATION) at 20:08

## 2018-07-08 RX ADMIN — METOPROLOL SUCCINATE 100 MG: 50 TABLET, EXTENDED RELEASE ORAL at 09:35

## 2018-07-08 RX ADMIN — HEPARIN SODIUM 5000 UNITS: 5000 INJECTION INTRAVENOUS; SUBCUTANEOUS at 12:33

## 2018-07-08 RX ADMIN — INSULIN GLARGINE 50 UNITS: 100 INJECTION, SOLUTION SUBCUTANEOUS at 09:41

## 2018-07-08 RX ADMIN — Medication 10 ML: at 23:14

## 2018-07-08 RX ADMIN — IPRATROPIUM BROMIDE AND ALBUTEROL SULFATE 3 ML: .5; 3 SOLUTION RESPIRATORY (INHALATION) at 07:38

## 2018-07-08 RX ADMIN — INSULIN LISPRO 6 UNITS: 100 INJECTION, SOLUTION INTRAVENOUS; SUBCUTANEOUS at 17:25

## 2018-07-08 RX ADMIN — PREDNISONE 40 MG: 20 TABLET ORAL at 09:35

## 2018-07-08 RX ADMIN — INSULIN LISPRO 2 UNITS: 100 INJECTION, SOLUTION INTRAVENOUS; SUBCUTANEOUS at 17:24

## 2018-07-08 RX ADMIN — HEPARIN SODIUM 5000 UNITS: 5000 INJECTION INTRAVENOUS; SUBCUTANEOUS at 06:06

## 2018-07-08 RX ADMIN — ASPIRIN 325 MG: 325 TABLET ORAL at 09:36

## 2018-07-08 RX ADMIN — BUMETANIDE 0.5 MG: 1 TABLET ORAL at 17:24

## 2018-07-08 RX ADMIN — Medication 10 ML: at 14:00

## 2018-07-08 RX ADMIN — MONTELUKAST SODIUM 10 MG: 10 TABLET, FILM COATED ORAL at 23:13

## 2018-07-08 RX ADMIN — POTASSIUM CHLORIDE 40 MEQ: 20 SOLUTION ORAL at 17:23

## 2018-07-08 RX ADMIN — ROSUVASTATIN CALCIUM 20 MG: 10 TABLET, FILM COATED ORAL at 23:12

## 2018-07-08 RX ADMIN — HEPARIN SODIUM 5000 UNITS: 5000 INJECTION INTRAVENOUS; SUBCUTANEOUS at 23:12

## 2018-07-08 RX ADMIN — LEVOFLOXACIN 500 MG: 5 INJECTION, SOLUTION INTRAVENOUS at 06:00

## 2018-07-08 RX ADMIN — INSULIN LISPRO 6 UNITS: 100 INJECTION, SOLUTION INTRAVENOUS; SUBCUTANEOUS at 12:33

## 2018-07-08 RX ADMIN — FAMOTIDINE 20 MG: 20 TABLET ORAL at 09:34

## 2018-07-08 RX ADMIN — INSULIN GLARGINE 40 UNITS: 100 INJECTION, SOLUTION SUBCUTANEOUS at 23:13

## 2018-07-08 RX ADMIN — IPRATROPIUM BROMIDE AND ALBUTEROL SULFATE 3 ML: .5; 3 SOLUTION RESPIRATORY (INHALATION) at 20:08

## 2018-07-08 RX ADMIN — Medication 10 ML: at 06:54

## 2018-07-08 NOTE — PROGRESS NOTES
Hospitalist Progress Note  Gwen Klein MD  Answering service: [phone] OR [phone] from in house phone  Cell: [phone]      Date of Service:  2018  NAME:  Claudine Mi  :  1955  MRN:  [id number]      Admission Summary:     Ms. Richie Ronquillo is a 59-year-old woman with a past medical history significant for congestive heart failure, recent diagnosis of COPD, hypertension, type 2 diabetes, coronary artery disease, status post stent placement, pulmonary nodules, dyslipidemia and suspected obstructive sleep apnea, was in her usual state of health until a few days ago, when the patient developed shortness of breath. The shortness of breath is progressive, associated with a fever and cough. The cough is productive of yellowish sputum. The shortness of breath is with little or no exertion. The fever is not associated with rigors and chills. The patient was last admitted to this hospital from 03/15/2018 through 2018. She was admitted with shortness of breath. The shortness of breath was attributed to suspected COPD. The patient was treated. During that hospitalization, a CT scan of the chest was obtained. The CT scan shows pulmonary nodules. The patient has since had a followup by the pulmonologist, in which the COPD was confirmed with PFTs. Interval history / Subjective: This AM patient with some improvement in her wheezing and SOB. Now on RA. Assessment & Plan:     COPD with acute exacerbation, resolving:   -Duo-nebs scheduled BID now  -PO Steroid taper. Currently on 40, decrease to 20 mg tomorrow  -continue Brovana and Pulmicort  -continue Levaquin, per Pulm  -AM CXR for tomorrow.  If stable, will discharge  -discussed with patient and sister    Chronic diastolic congestive heart failure, NYHA Class III-IV on admission:  -Switch to home PO Bumex today given bump in cr  -continue beta blocker and ACE   -ECHO 3/2018 with EF of 60%  -Cardology consulted; not thought to be etiology for her symptoms  -appreciate further recs    GERD: Esophagram unremarkable. Only mild GERD demonstrated. Elevated d-dimer: Patient with negative dopplers for DVT. -CTA was negative    Hypertension: Continue home medications. Type 2 diabetes with hyperglycemia: Continue insulin. Hypokalemia, replete as needed. Hypocalcemia, ionized level WNL. Anemia. This is due to chronic disease. Acute renal insuffiency, resolved. Suspected obstructive sleep apnea: This was suspected during the last hospitalization. The patient has since been seen by Pulmonology in the office.   -no sleep study done prior  -will need outpatient follow-up    Coronary artery disease, status post stent placement. Pulmonary ground glass opacities seen on imaging. Interval increase.  -Pulmonary following   -no need to get outpatient scan again for now      Acute respiratory failure with hypoxia: This is due to multiple etiological factors, including congestive heart failure and chronic obstructive pulmonary disease. Dyslipidemia: Continue statin. Leg swelling: Dopplers negative. Code status: FULL  DVT prophylaxis: heparin    Care Plan discussed with: Patient/Family and Nurse  Disposition: Home w/Family and TBD, likely tomorrow if continues to improve     Hospital Problems  Date Reviewed: 7/2/2018          Codes Class Noted POA    * (Principal)CHF, acute on chronic ([facility] [address] ICD-10-CM: I50.9  ICD-9-CM: 428.0  7/2/2018 Yes              Review of Systems:   Pertinent items are noted in HPI. Vital Signs:    Last 24hrs VS reviewed since prior progress note.  Most recent are:  Visit Vitals    /78 (BP 1 Location: Left arm, BP Patient Position: At rest)    Pulse 88    Temp 98.6 °F (37 °C)    Resp 18    Ht 5' 4\" (1.626 m)    Wt 94.3 kg (207 lb 14.3 oz)    SpO2 95%    BMI 35.68 kg/m2         Intake/Output Summary (Last 24 hours) at 07/08/18 2[facility] filed at 07/08/18 0800   Gross per 24 hour   Intake              240 ml   Output              450 ml   Net             -210 ml        Physical Examination:       Constitutional:  No acute distress, cooperative, pleasant    ENT:  Neck supple   Resp:  Rhonchi and mild wheezing bilaterally   CV:  Regular rhythm, normal rate    GI:  Soft, non distended, non tender. Musculoskeletal:  Trace edema    Neurologic:  Moves all extremities. Non-focal. Alert and oriented        Data Review:    Review and/or order of clinical lab test  Review and/or order of tests in the radiology section of CPT  Review and/or order of tests in the medicine section of [facility]    Labs:     Recent Labs      07/06/18 0457   WBC  11.7*   HGB  11.8   HCT  35.5   PLT  349     Recent Labs      07/08/18   0659  07/07/18   0440  07/06/18 0457   NA  143  142  142   K  3.2*  3.3*  3.1*   CL  107  103  104   CO2  28  29  28   BUN  28*  33*  30*   CREA  0.99  1.16*  0.97   GLU  66  148*  106*   CA  8.0*  8.2*  8.1*   MG  2.2  2.4   --      No results for input(s): SGOT, GPT, ALT, AP, TBIL, TBILI, TP, ALB, GLOB, GGT, AML, LPSE in the last 72 hours. No lab exists for component: AMYP, HLPSE  No results for input(s): INR, PTP, APTT in the last 72 hours. No lab exists for component: INREXT, INREXT   No results for input(s): FE, TIBC, PSAT, FERR in the last 72 hours. Lab Results   Component Value Date/Time    Folate 7.3 07/02/2018 04:27 AM      No results for input(s): PH, PCO2, PO2 in the last 72 hours. No results for input(s): CPK, CKNDX, TROIQ in the last 72 hours.     No lab exists for component: CPKMB  Lab Results   Component Value Date/Time    Cholesterol, total 260 (H) 07/02/2018 04:27 AM    HDL Cholesterol 31 07/02/2018 04:27 AM    LDL, calculated 197.2 (H) 07/02/2018 04:27 AM    Triglyceride 159 (H) 07/02/2018 04:27 AM    CHOL/HDL Ratio 8.4 (H) 07/02/2018 04:27 AM     Lab Results   Component Value Date/Time    Glucose (POC) 112 (H) 07/08/2018 11:27 AM    Glucose (POC) 80 07/08/2018 07:14 AM    Glucose (POC) 72 07/08/2018 06:54 AM    Glucose (POC) 122 (H) 07/07/2018 10:18 PM    Glucose (POC) 144 (H) 07/07/2018 05:29 PM     Lab Results   Component Value Date/Time    Color YELLOW/STRAW 07/02/2018 12:05 PM    Appearance CLEAR 07/02/2018 12:05 PM    Specific gravity 1.011 07/02/2018 12:05 PM    Specific gravity 1.010 06/08/2011 12:00 AM    pH (UA) 5.0 07/02/2018 12:05 PM    Protein 300 (A) 07/02/2018 12:05 PM    Glucose >1000 (A) 07/02/2018 12:05 PM    Ketone NEGATIVE  07/02/2018 12:05 PM    Bilirubin NEGATIVE  07/02/2018 12:05 PM    Urobilinogen 0.2 07/02/2018 12:05 PM    Nitrites NEGATIVE  07/02/2018 12:05 PM    Leukocyte Esterase NEGATIVE  07/02/2018 12:05 PM    Epithelial cells FEW 07/02/2018 12:05 PM    Bacteria NEGATIVE  07/02/2018 12:05 PM    WBC 0-4 07/02/2018 12:05 PM    RBC 0-5 07/02/2018 12:05 PM         Medications Reviewed:     Current Facility-Administered Medications   Medication Dose Route Frequency    bumetanide (BUMEX) tablet 0.5 mg  0.5 mg Oral BID    albuterol-ipratropium (DUO-NEB) 2.5 MG-0.5 MG/3 ML  3 mL Nebulization BID RT    famotidine (PEPCID) tablet 20 mg  20 mg Oral BID    NIFEdipine ER (PROCARDIA XL) tablet 60 mg  60 mg Oral DAILY    predniSONE (DELTASONE) tablet 40 mg  40 mg Oral DAILY WITH BREAKFAST    insulin glargine (LANTUS) injection 40 Units  40 Units SubCUTAneous QHS    insulin glargine (LANTUS) injection 50 Units  50 Units SubCUTAneous DAILY    metoprolol succinate (TOPROL-XL) XL tablet 100 mg  100 mg Oral DAILY    insulin lispro (HUMALOG) injection 6 Units  6 Units SubCUTAneous TIDAC    aspirin (ASPIRIN) tablet 325 mg  325 mg Oral DAILY    montelukast (SINGULAIR) tablet 10 mg  10 mg Oral QHS    sodium chloride (NS) flush 5-10 mL  5-10 mL IntraVENous Q8H    sodium chloride (NS) flush 5-10 mL  5-10 mL IntraVENous PRN    acetaminophen (TYLENOL) tablet 650 mg  650 mg Oral Q4H PRN    ondansetron [facility]) injection 4 mg  4 mg IntraVENous Q4H PRN    bisacodyl (DULCOLAX) tablet 5 mg  5 mg Oral DAILY PRN    heparin (porcine) injection 5,000 Units  5,000 Units SubCUTAneous Q8H    Saccharomyces boulardii (FLORASTOR) capsule 250 mg  250 mg Oral DAILY    levoFLOXacin (LEVAQUIN) 500 mg in D5W IVPB  500 mg IntraVENous Q24H    insulin lispro (HUMALOG) injection   SubCUTAneous AC&HS    glucose chewable tablet 16 g  4 Tab Oral PRN    dextrose (D50W) injection syrg 12.5-25 g  12.5-25 g IntraVENous PRN    glucagon (GLUCAGEN) injection 1 mg  1 mg IntraMUSCular PRN    arformoterol (BROVANA) neb solution 15 mcg  15 mcg Nebulization BID RT    And    budesonide (PULMICORT) 500 mcg/2 ml nebulizer suspension  500 mcg Nebulization BID RT    rosuvastatin (CRESTOR) tablet 20 mg  20 mg Oral QHS    lisinopril (PRINIVIL, ZESTRIL) tablet 40 mg  40 mg Oral DAILY     ______________________________________________________________________  EXPECTED LENGTH OF STAY: 4d 12h  ACTUAL LENGTH OF STAY:          6                 Samanta Courtney MD

## 2018-07-08 NOTE — PROGRESS NOTES
TRANSFER - OUT REPORT:    Verbal report given to Brook Gant RN (name) on [employer]  being transferred to [employer] (unit) for routine progression of care       Report consisted of patients Situation, Background, Assessment and   Recommendations(SBAR). Information from the following report(s) SBAR was reviewed with the receiving nurse. Lines:   Peripheral IV 07/02/18 Right Forearm (Active)   Site Assessment Clean, dry, & intact 7/8/2018  8:00 AM   Phlebitis Assessment 0 7/8/2018  8:00 AM   Infiltration Assessment 0 7/8/2018  8:00 AM   Dressing Status Clean, dry, & intact 7/8/2018  8:00 AM   Dressing Type Transparent 7/8/2018  8:00 AM   Hub Color/Line Status Green 7/8/2018  8:00 AM   Action Taken Open ports on tubing capped 7/7/2018  1:00 AM   Alcohol Cap Used Yes 7/8/2018  8:00 AM        Opportunity for questions and clarification was provided.       Patient transported with:   [facility]

## 2018-07-09 ENCOUNTER — APPOINTMENT (OUTPATIENT)
Dept: GENERAL RADIOLOGY | Age: 63
DRG: 190 | End: 2018-07-09
Attending: INTERNAL MEDICINE
Payer: COMMERCIAL

## 2018-07-09 VITALS
RESPIRATION RATE: 15 BRPM | OXYGEN SATURATION: 97 % | TEMPERATURE: 97.5 F | SYSTOLIC BLOOD PRESSURE: 135 MMHG | HEIGHT: 64 IN | HEART RATE: 96 BPM | WEIGHT: 209 LBS | BODY MASS INDEX: 35.68 KG/M2 | DIASTOLIC BLOOD PRESSURE: 80 MMHG

## 2018-07-09 PROBLEM — I50.9 CHF, ACUTE ON CHRONIC (HCC): Status: RESOLVED | Noted: 2018-07-02 | Resolved: 2018-07-09

## 2018-07-09 LAB
ANION GAP SERPL CALC-SCNC: 8 MMOL/L (ref 5–15)
BASOPHILS # BLD: 0 K/UL (ref 0–0.1)
BASOPHILS NFR BLD: 0 % (ref 0–1)
BUN SERPL-MCNC: 27 MG/DL (ref 6–20)
BUN/CREAT SERPL: 23 (ref 12–20)
CALCIUM SERPL-MCNC: 8.1 MG/DL (ref 8.5–10.1)
CHLORIDE SERPL-SCNC: 105 MMOL/L (ref 97–108)
CO2 SERPL-SCNC: 28 MMOL/L (ref 21–32)
CREAT SERPL-MCNC: 1.16 MG/DL (ref 0.55–1.02)
DIFFERENTIAL METHOD BLD: ABNORMAL
EOSINOPHIL # BLD: 0.1 K/UL (ref 0–0.4)
EOSINOPHIL NFR BLD: 1 % (ref 0–7)
ERYTHROCYTE [DISTWIDTH] IN BLOOD BY AUTOMATED COUNT: 14.6 % (ref 11.5–14.5)
GLUCOSE BLD STRIP.AUTO-MCNC: 128 MG/DL (ref 65–100)
GLUCOSE BLD STRIP.AUTO-MCNC: 143 MG/DL (ref 65–100)
GLUCOSE SERPL-MCNC: 124 MG/DL (ref 65–100)
HCT VFR BLD AUTO: 36.7 % (ref 35–47)
HGB BLD-MCNC: 11.9 G/DL (ref 11.5–16)
IMM GRANULOCYTES # BLD: 0 K/UL
IMM GRANULOCYTES NFR BLD AUTO: 0 %
LYMPHOCYTES # BLD: 4.4 K/UL (ref 0.8–3.5)
LYMPHOCYTES NFR BLD: 31 % (ref 12–49)
MCH RBC QN AUTO: 31.7 PG (ref 26–34)
MCHC RBC AUTO-ENTMCNC: 32.4 G/DL (ref 30–36.5)
MCV RBC AUTO: 97.9 FL (ref 80–99)
METAMYELOCYTES NFR BLD MANUAL: 1 %
MONOCYTES # BLD: 0.6 K/UL (ref 0–1)
MONOCYTES NFR BLD: 4 % (ref 5–13)
NEUTS BAND NFR BLD MANUAL: 1 % (ref 0–6)
NEUTS SEG # BLD: 8.9 K/UL (ref 1.8–8)
NEUTS SEG NFR BLD: 62 % (ref 32–75)
NRBC # BLD: 0 K/UL (ref 0–0.01)
NRBC BLD-RTO: 0 PER 100 WBC
PLATELET # BLD AUTO: 429 K/UL (ref 150–400)
PLATELET COMMENTS,PCOM: ABNORMAL
PMV BLD AUTO: 9.5 FL (ref 8.9–12.9)
POTASSIUM SERPL-SCNC: 3.7 MMOL/L (ref 3.5–5.1)
RBC # BLD AUTO: 3.75 M/UL (ref 3.8–5.2)
RBC MORPH BLD: ABNORMAL
SERVICE CMNT-IMP: ABNORMAL
SERVICE CMNT-IMP: ABNORMAL
SODIUM SERPL-SCNC: 141 MMOL/L (ref 136–145)
WBC # BLD AUTO: 14.2 K/UL (ref 3.6–11)

## 2018-07-09 PROCEDURE — 74011000250 HC RX REV CODE- 250: Performed by: INTERNAL MEDICINE

## 2018-07-09 PROCEDURE — 36415 COLL VENOUS BLD VENIPUNCTURE: CPT | Performed by: INTERNAL MEDICINE

## 2018-07-09 PROCEDURE — 74011250637 HC RX REV CODE- 250/637: Performed by: HOSPITALIST

## 2018-07-09 PROCEDURE — 74011636637 HC RX REV CODE- 636/637: Performed by: INTERNAL MEDICINE

## 2018-07-09 PROCEDURE — 74011250637 HC RX REV CODE- 250/637: Performed by: INTERNAL MEDICINE

## 2018-07-09 PROCEDURE — 82962 GLUCOSE BLOOD TEST: CPT

## 2018-07-09 PROCEDURE — 97116 GAIT TRAINING THERAPY: CPT

## 2018-07-09 PROCEDURE — 94640 AIRWAY INHALATION TREATMENT: CPT

## 2018-07-09 PROCEDURE — 71045 X-RAY EXAM CHEST 1 VIEW: CPT

## 2018-07-09 PROCEDURE — 80048 BASIC METABOLIC PNL TOTAL CA: CPT | Performed by: INTERNAL MEDICINE

## 2018-07-09 PROCEDURE — 85025 COMPLETE CBC W/AUTO DIFF WBC: CPT | Performed by: INTERNAL MEDICINE

## 2018-07-09 PROCEDURE — 74011250636 HC RX REV CODE- 250/636: Performed by: INTERNAL MEDICINE

## 2018-07-09 RX ORDER — PREDNISONE 10 MG/1
TABLET ORAL
Qty: 9 TAB | Refills: 0 | Status: SHIPPED | OUTPATIENT
Start: 2018-07-09 | End: 2019-04-28

## 2018-07-09 RX ORDER — PREDNISONE 20 MG/1
20 TABLET ORAL
Status: DISCONTINUED | OUTPATIENT
Start: 2018-07-09 | End: 2018-07-09 | Stop reason: HOSPADM

## 2018-07-09 RX ORDER — NIFEDIPINE 60 MG/1
60 TABLET, EXTENDED RELEASE ORAL DAILY
Qty: 30 TAB | Refills: 0 | Status: SHIPPED | OUTPATIENT
Start: 2018-07-09 | End: 2022-05-17

## 2018-07-09 RX ORDER — ROSUVASTATIN CALCIUM 20 MG/1
20 TABLET, COATED ORAL
Qty: 30 TAB | Refills: 0 | Status: ON HOLD | OUTPATIENT
Start: 2018-07-09 | End: 2021-12-10 | Stop reason: SDUPTHER

## 2018-07-09 RX ADMIN — NIFEDIPINE 60 MG: 60 TABLET, FILM COATED, EXTENDED RELEASE ORAL at 08:42

## 2018-07-09 RX ADMIN — FAMOTIDINE 20 MG: 20 TABLET ORAL at 08:44

## 2018-07-09 RX ADMIN — INSULIN LISPRO 2 UNITS: 100 INJECTION, SOLUTION INTRAVENOUS; SUBCUTANEOUS at 11:48

## 2018-07-09 RX ADMIN — LISINOPRIL 40 MG: 20 TABLET ORAL at 08:42

## 2018-07-09 RX ADMIN — IPRATROPIUM BROMIDE AND ALBUTEROL SULFATE 3 ML: .5; 3 SOLUTION RESPIRATORY (INHALATION) at 07:45

## 2018-07-09 RX ADMIN — ASPIRIN 325 MG: 325 TABLET ORAL at 08:43

## 2018-07-09 RX ADMIN — LEVOFLOXACIN 500 MG: 5 INJECTION, SOLUTION INTRAVENOUS at 05:05

## 2018-07-09 RX ADMIN — INSULIN GLARGINE 50 UNITS: 100 INJECTION, SOLUTION SUBCUTANEOUS at 08:41

## 2018-07-09 RX ADMIN — METOPROLOL SUCCINATE 100 MG: 50 TABLET, EXTENDED RELEASE ORAL at 08:42

## 2018-07-09 RX ADMIN — Medication 10 ML: at 05:05

## 2018-07-09 RX ADMIN — BUDESONIDE 500 MCG: 0.5 INHALANT RESPIRATORY (INHALATION) at 07:45

## 2018-07-09 RX ADMIN — HEPARIN SODIUM 5000 UNITS: 5000 INJECTION INTRAVENOUS; SUBCUTANEOUS at 05:05

## 2018-07-09 RX ADMIN — PREDNISONE 20 MG: 20 TABLET ORAL at 08:45

## 2018-07-09 RX ADMIN — INSULIN LISPRO 6 UNITS: 100 INJECTION, SOLUTION INTRAVENOUS; SUBCUTANEOUS at 11:48

## 2018-07-09 RX ADMIN — BUMETANIDE 0.5 MG: 1 TABLET ORAL at 08:43

## 2018-07-09 RX ADMIN — RDII 250 MG CAPSULE 250 MG: at 08:43

## 2018-07-09 NOTE — DISCHARGE INSTRUCTIONS
Discharge Instructions       PATIENT ID: Montez Orlando  MRN: [id number]   YOB: 1955    DATE OF ADMISSION: 7/2/2018 12:46 AM    DATE OF DISCHARGE: 7/9/2018    PRIMARY CARE PROVIDER: Romana Orr MD     ATTENDING PHYSICIAN: Bettye Valles MD  DISCHARGING PROVIDER: Bettye Valles MD    To contact this individual call [phone] and ask the  to page. If unavailable ask to be transferred the Adult Hospitalist Department. DISCHARGE DIAGNOSES COPD exacerbation    CONSULTATIONS: IP CONSULT TO CARDIOLOGY  IP CONSULT TO PULMONOLOGY    PROCEDURES/SURGERIES: * No surgery found *    PENDING TEST RESULTS:   At the time of discharge the following test results are still pending: None    FOLLOW UP APPOINTMENTS:   Follow-up Information     Follow up With Details Comments Contact Info    Royce Enrique NP On 7/13/2018 at 3:45 pm on 7/13/18 [address] [employer] [address] [place], [place] [address] [facility], [address]  [address] [facility] 71      Tiffany Laws MD Schedule an appointment as soon as possible for a visit  [address] [place] [address].  [phone]             ADDITIONAL CARE RECOMMENDATIONS:     You came in with shortness of breath that was related to your underlying lung disease. We treated you with antibiotics and steroids. You do not need any more antibiotics. Continue tapering your steroids (see prescription - 2 tabs for 2 more days,1 tablet for 3 days, half tablet for 3 days)    Please follow-up with your PCP AND lung doctor. You need to be assessed for sleep apnea. YOU NO LONGER NEED the outpatient CT scan that was setup because we got one here. We changed one of your blood pressure medications and decreased your home insulin dose. See your PCP about further adjustments.     DIET: Cardiac Diet and Diabetic Diet    ACTIVITY: Activity as tolerated    DISCHARGE MEDICATIONS:   See Medication Reconciliation Form    · It is important that you take the medication exactly as they are prescribed. · Keep your medication in the bottles provided by the pharmacist and keep a list of the medication names, dosages, and times to be taken in your wallet. · Do not take other medications without consulting your doctor. NOTIFY YOUR PHYSICIAN FOR ANY OF THE FOLLOWING:   Fever over 101 degrees for 24 hours. Chest pain, shortness of breath, fever, chills, nausea, vomiting, diarrhea, change in mentation, falling, weakness, bleeding. Severe pain or pain not relieved by medications. Or, any other signs or symptoms that you may have questions about. DISPOSITION:    Home With:   OT  PT  HH  RN       SNF/Inpatient Rehab/LTAC    Independent/assisted living    Hospice    Other:       Signed:   Esther Joiner MD  7/9/2018  8:40 AM    Smoking Cessation Program: This is a free, phone/text/email based, smoking cessation program. The program is individualized to meet each patient's needs. To enroll use the link [email or web address] [email or web address]/ra/[email or web address] or text Binu Dai to [phone] from any smart phone.

## 2018-07-09 NOTE — PROGRESS NOTES
Problem: Mobility Impaired (Adult and Pediatric)  Goal: *Acute Goals and Plan of Care (Insert Text)  Physical Therapy Goals  Initiated 7/6/2018  1. Patient will move from supine to sit and sit to supine  in bed with independence within 7 day(s). 2.  Patient will transfer from bed to chair and chair to bed with modified independence using the least restrictive device within 7 day(s). 3.  Patient will perform sit to stand with modified independence within 7 day(s). 4.  Patient will ambulate with modified independence for 350 feet with the least restrictive device within 7 day(s). 5.  Patient will ascend/descend 3 stairs with 1 handrail(s) with modified independence within 7 day(s). physical Therapy TREATMENT  Patient: Kodi Carter (34 y.o. female)  Date: 7/9/2018  Diagnosis: CHF, acute on chronic (HCC) CHF, acute on chronic (HCC)       Precautions:    Chart, physical therapy assessment, plan of care and goals were reviewed. ASSESSMENT:  Pt cleared by RN for PT. Pt received lying in bed w/ HOB elevated chatting w/ roommate's visitor. Pt agreeable to PT, reports eagerness to go home. Pt demonstrated bed mobility w/supervision; able to amb to BR prior to gait in unger w/ SBA (Independent w/ toileting). Pt able to stand to assist w/ buttoning of gown sleeves w/ SBA (intact balance). Pt amb x 200 total w/ SBA and no use of AD. Pt continues to occasionally reach out for wall during gait although pt w/o any LOB or buckling. Encouraged use of SPC for additional support-pt verbalized understanding. Pt O2 remained above 90% on RA w/all activity, HRmax 101. Pt completed and cleared stair training w/ single rail (x15 steps,CGA). After completion of gait, pt returned to sitting in chair at bedside w/ all items in reach. Pt reports she lives w/her son and her sister lives next door. Reports plan to return to her home and ready to do so w/ Home Health from PT standpoint.      Progression toward goals:  [x] Improving appropriately and progressing toward goals  []    Improving slowly and progressing toward goals  []    Not making progress toward goals and plan of care will be adjusted     PLAN:  Patient continues to benefit from skilled intervention to address the above impairments. Continue treatment per established plan of care. Discharge Recommendations:  Home Health  Further Equipment Recommendations for Discharge:  SPC vs None      SUBJECTIVE:   Patient stated Oh! I thought you were the one to tell me I can go!     OBJECTIVE DATA SUMMARY:   Critical Behavior:  Neurologic State: Alert, Appropriate for age  Orientation Level: Appropriate for age  Cognition: Appropriate decision making     Functional Mobility Training:  Bed Mobility:     Supine to Sit: Supervision  Sit to Supine:  (NT- pt returned to chair)           Transfers:  Sit to Stand: Modified independent  Stand to Sit: Independent                             Balance:  Sitting: Intact  Standing: Intact (able to stand to assist w/ gown w/o AD)  Standing - Static: Good  Standing - Dynamic : Good  Ambulation/Gait Training:  Distance (ft): 200 Feet (ft)  Assistive Device: Gait belt  Ambulation - Level of Assistance: Stand-by assistance        Gait Abnormalities: Decreased step clearance;Trunk sway increased        Base of Support: Widened     Speed/Maryann: Pace decreased (<100 feet/min)  Step Length: Left shortened;Right shortened                   Stairs:  Number of Stairs Trained: 15  Stairs - Level of Assistance: Contact guard assistance   Rail Use: Left       Therapeutic Exercises:   PLB during stair training  Pain:  Pain Scale 1: Numeric (0 - 10)  Pain Intensity 1: 0              Activity Tolerance:   VSS. Please refer to the flowsheet for vital signs taken during this treatment.   After treatment:   [x]    Patient left in no apparent distress sitting up in chair  []    Patient left in no apparent distress in bed  [x]    Call bell left within reach  [x] Nursing notified  []    Caregiver present  []    Bed alarm activated    COMMUNICATION/COLLABORATION:   The patients plan of care was discussed with: Registered Nurse    Rachel Ghotra PTA   Time Calculation: 17 mins

## 2018-07-09 NOTE — PROGRESS NOTES
Bedside shift change report given to Eliza Richards (oncoming nurse) by Khari Amaro (offgoing nurse). Report included the following information SBAR, Kardex and MAR.

## 2018-07-09 NOTE — DISCHARGE SUMMARY
Discharge Summary       PATIENT ID: Yareli Guerra  MRN: [id number]   YOB: 1955    DATE OF ADMISSION: 7/2/2018 12:46 AM    DATE OF DISCHARGE: 7/9/2018   PRIMARY CARE PROVIDER: Rafi Ambriz MD     DISCHARGING PROVIDER: Jan Braga MD    To contact this individual call [phone] and ask the  to page. If unavailable ask to be transferred the Adult Hospitalist Department. CONSULTATIONS: IP CONSULT TO CARDIOLOGY  IP CONSULT TO PULMONOLOGY    PROCEDURES/SURGERIES: * No surgery found *    ADMITTING [address] [place] [address] COURSE:     Ms. Cesar Huggins is a 65-year-old woman with a past medical history significant for congestive heart failure, recent diagnosis of COPD, hypertension, type 2 diabetes, coronary artery disease, status post stent placement, pulmonary nodules, dyslipidemia and suspected obstructive sleep apnea, was in her usual state of health until a few days ago, when the patient developed shortness of breath.  The shortness of breath is progressive, associated with a fever and cough.  The cough is productive of yellowish sputum.  The shortness of breath is with little or no exertion.  The fever is not associated with rigors and chills.  The patient was last admitted to this hospital from 03/15/2018 through 03/19/2018. Marlene Lin was admitted with shortness of breath.  The shortness of breath was attributed to suspected COPD.  The patient was treated.  During that hospitalization, a CT scan of the chest was obtained.  The CT scan shows pulmonary nodules.  The patient has since had a followup by the pulmonologist, in which the COPD was confirmed with PFTs.      Patient admitted and treated for her underlying lung disease exacerbation. Due to persistent SOB on PO steroids, she did require a period of IV dosing. To continue prednisone taper as an outpatient. Completed course of Levaquin inpatient. Patient weaned off oxygen and now stable for discharge.      DISCHARGE DIAGNOSES / PLAN: COPD with acute exacerbation, resolving:   -Duo-nebs PRN at home  -PO Steroid taper for 8 more days  -continue home Advair  -Pulmonary follow-up     Chronic diastolic congestive heart failure, NYHA Class III-IV on admission:  -continue home Bumex  -continue beta blocker and ACE   -continue aspirin  -ECHO 3/2018 with EF of 60%     GERD: Esophagram unremarkable. Only mild GERD demonstrated.       Elevated d-dimer: Patient with negative dopplers for DVT. -CTA was negative     Hypertension: Continue CCB, ACE, and Toprol (as below).    Type 2 diabetes with hyperglycemia: Continue insulin.     Hypokalemia, replete as needed.     Hypocalcemia, ionized level WNL.     Anemia. This is due to chronic disease.      Acute renal insuffiency, resolved.     Suspected obstructive sleep apnea: This was suspected during the last hospitalization.  The patient has since been seen by Pulmonology in the office.   -no sleep study done prior  -will need outpatient follow-up     Coronary artery disease, status post stent placement.   -see above     Pulmonary ground glass opacities seen on imaging. Interval increase.  -Pulmonary follow-up  -no need to get outpatient scan again for now       Acute respiratory failure with hypoxia, resolved: This is due to multiple etiological factors, including congestive heart failure and chronic obstructive pulmonary disease.     Dyslipidemia: Continue statin.     Leg swelling: Dopplers negative.        PENDING TEST RESULTS:   At the time of discharge the following test results are still pending: NONE     FOLLOW UP APPOINTMENTS:    Follow-up Information     Follow up With Details Comments Contact [address], NP On 7/13/2018 at 3:45 pm on 7/13/18 Via [employer] [address] [place] [address] [id number] [place] [address], [address] [place] [address]  [address] [facility]      Teddy Hanna MD Schedule an appointment as soon as possible for a visit  [address] [place] [address] [facility]  [phone]             ADDITIONAL CARE RECOMMENDATIONS:     DIET: Cardiac Diet and Diabetic Diet    ACTIVITY: Activity as tolerated    DISCHARGE MEDICATIONS:  Current Discharge Medication List      START taking these medications    Details   NIFEdipine ER (PROCARDIA XL) 60 mg ER tablet Take 1 Tab by mouth daily. Qty: 30 Tab, Refills: 0      predniSONE (DELTASONE) 10 mg tablet Take two tablets for two days, 1 tablet for 3 days, and half a tablet for 3 days  Qty: 9 Tab, Refills: 0      rosuvastatin (CRESTOR) 20 mg tablet Take 1 Tab by mouth nightly. Qty: 30 Tab, Refills: 0         CONTINUE these medications which have CHANGED    Details   insulin NPH/insulin regular (NOVOLIN 70/30 U-100 INSULIN) 100 unit/mL (70-30) injection 45 Units by SubCUTAneous route two (2) times a day. Qty: 10 mL, Refills: 0         CONTINUE these medications which have NOT CHANGED    Details   lisinopril (PRINIVIL, ZESTRIL) 40 mg tablet Take 40 mg by mouth daily. metoprolol succinate (TOPROL XL) 100 mg tablet Take 100 mg by mouth daily. bumetanide (BUMEX) 0.5 mg tablet Take 0.5 mg by mouth two (2) times a day. albuterol-ipratropium (DUO-NEB) 2.5 mg-0.5 mg/3 ml nebu 3 mL by Nebulization route every six (6) hours as needed. Qty: 30 Nebule, Refills: 0      montelukast (SINGULAIR) 10 mg tablet Take 1 Tab by mouth nightly. Qty: 30 Tab, Refills: 0      aspirin (ASPIRIN) 325 mg tablet Take 325 mg by mouth daily. fluticasone-salmeterol (ADVAIR DISKUS) 250-50 mcg/dose diskus inhaler Take 1 Puff by inhalation two (2) times daily as needed. STOP taking these medications       amLODIPine (NORVASC) 5 mg tablet Comments:   Reason for Stopping:               NOTIFY YOUR PHYSICIAN FOR ANY OF THE FOLLOWING:   Fever over 101 degrees for 24 hours. Chest pain, shortness of breath, fever, chills, nausea, vomiting, diarrhea, change in mentation, falling, weakness, bleeding.  Severe pain or pain not relieved by medications. Or, any other signs or symptoms that you may have questions about. DISPOSITION:    Home With:   OT  PT  HH  RN       Long term SNF/Inpatient Rehab    Independent/assisted living    Hospice    Other:       PATIENT CONDITION AT DISCHARGE:     Functional status    Poor     Deconditioned     Independent      Cognition     Lucid     Forgetful     Dementia      Catheters/lines (plus indication)    Victor     PICC     PEG     None      Code status     Full code     DNR      PHYSICAL EXAMINATION AT DISCHARGE:    Constitutional:  No acute distress, cooperative, pleasant    ENT:  Neck supple   Resp:  Mild expiratory wheezing bilaterally   CV:  Regular rhythm, normal rate    GI:  Soft, non distended, non tender. Musculoskeletal:  TRACE edema    Neurologic:  Moves all extremities.   Non-focal. Alert and oriented       CHRONIC MEDICAL DIAGNOSES:  Problem List as of 7/9/2018  Date Reviewed: 7/2/2018          Codes Class Noted - Resolved    Bilateral leg edema ICD-10-CM: R60.0  ICD-9-CM: 782.3  3/15/2018 - Present        Chest pain at rest ICD-10-CM: R07.9  ICD-9-CM: 786.50  2/21/2014 - Present        ASHD (arteriosclerotic heart disease) ICD-10-CM: I25.10  ICD-9-CM: 414.00  2/21/2014 - Present        Diabetes mellitus ([facility] ICD-10-CM: E11.9  ICD-9-CM: 250.00  2/21/2014 - Present        Hypertension, benign ICD-10-CM: I10  ICD-9-CM: 401.1  2/21/2014 - Present        Hypercholesteremia ICD-10-CM: E78.00  ICD-9-CM: 272.0  2/21/2014 - Present        ASO (arteriosclerosis obliterans) ICD-10-CM: I70.90  ICD-9-CM: 440.9  2/21/2014 - Present        * (Principal)RESOLVED: CHF, acute on chronic ([facility] ICD-10-CM: I50.9  ICD-9-CM: 428.0  7/2/2018 - 7/9/2018        RESOLVED: Fever ICD-10-CM: R50.9  ICD-9-CM: 780.60  3/15/2018 - 3/19/2018        RESOLVED: SIRS (systemic inflammatory response syndrome) ([facility] ICD-10-CM: R65.10  ICD-9-CM: 995.90  3/15/2018 - 3/19/2018        RESOLVED: Asthma exacerbation ICD-10-CM: J45.901  ICD-9-CM: 493.92  3/15/2018 - 3/19/2018              Greater than 30 minutes were spent with the patient on counseling and coordination of care    Signed:   Dong Solorio MD  7/9/2018  8:16 AM

## 2018-07-09 NOTE — PROGRESS NOTES
Specialty appointment has been scheduled with Dr. Harjeet White for Tuesday, 7/17/18 at 9:45 a.m. Patient needs to arrive 15 minutes early. Pending patient discharge.   Keny Lara, Care Management Specialist.

## 2018-07-10 ENCOUNTER — TELEPHONE (OUTPATIENT)
Dept: CASE MANAGEMENT | Age: 63
End: 2018-07-10

## 2018-07-11 ENCOUNTER — TELEPHONE (OUTPATIENT)
Dept: CASE MANAGEMENT | Age: 63
End: 2018-07-11

## 2019-01-30 ENCOUNTER — HOSPITAL ENCOUNTER (OUTPATIENT)
Dept: CT IMAGING | Age: 64
Discharge: HOME | End: 2019-01-30
Attending: INTERNAL MEDICINE
Payer: COMMERCIAL

## 2019-01-30 DIAGNOSIS — R93.89 ABNORMAL CT SCAN: ICD-10-CM

## 2019-01-30 PROCEDURE — 71250 CT THORAX DX C-: CPT

## 2019-04-24 ENCOUNTER — APPOINTMENT (OUTPATIENT)
Dept: GENERAL RADIOLOGY | Age: 64
DRG: 291 | End: 2019-04-24
Attending: EMERGENCY MEDICINE
Payer: COMMERCIAL

## 2019-04-24 ENCOUNTER — HOSPITAL ENCOUNTER (INPATIENT)
Age: 64
LOS: 3 days | Discharge: HOME HEALTH | DRG: 291 | End: 2019-04-28
Attending: EMERGENCY MEDICINE | Admitting: INTERNAL MEDICINE
Payer: COMMERCIAL

## 2019-04-24 DIAGNOSIS — R60.1 ANASARCA: ICD-10-CM

## 2019-04-24 DIAGNOSIS — R77.8 ELEVATED TROPONIN: Primary | ICD-10-CM

## 2019-04-24 DIAGNOSIS — R05.9 COUGH: ICD-10-CM

## 2019-04-24 PROCEDURE — 99284 EMERGENCY DEPT VISIT MOD MDM: CPT

## 2019-04-24 PROCEDURE — 93005 ELECTROCARDIOGRAM TRACING: CPT

## 2019-04-24 PROCEDURE — 83880 ASSAY OF NATRIURETIC PEPTIDE: CPT

## 2019-04-24 PROCEDURE — 71045 X-RAY EXAM CHEST 1 VIEW: CPT

## 2019-04-24 PROCEDURE — 80053 COMPREHEN METABOLIC PANEL: CPT

## 2019-04-25 ENCOUNTER — APPOINTMENT (OUTPATIENT)
Dept: VASCULAR SURGERY | Age: 64
DRG: 291 | End: 2019-04-25
Attending: INTERNAL MEDICINE
Payer: COMMERCIAL

## 2019-04-25 ENCOUNTER — APPOINTMENT (OUTPATIENT)
Dept: NON INVASIVE DIAGNOSTICS | Age: 64
DRG: 291 | End: 2019-04-25
Attending: INTERNAL MEDICINE
Payer: COMMERCIAL

## 2019-04-25 PROBLEM — I21.4 NSTEMI (NON-ST ELEVATED MYOCARDIAL INFARCTION) (HCC): Status: ACTIVE | Noted: 2019-04-25

## 2019-04-25 LAB
ALBUMIN SERPL-MCNC: 2.5 G/DL (ref 3.5–5)
ALBUMIN/GLOB SERPL: 0.6 {RATIO} (ref 1.1–2.2)
ALP SERPL-CCNC: 131 U/L (ref 45–117)
ALT SERPL-CCNC: 18 U/L (ref 12–78)
ANION GAP SERPL CALC-SCNC: 6 MMOL/L (ref 5–15)
APPEARANCE UR: CLEAR
APTT PPP: 24.4 SEC (ref 22.1–32)
AST SERPL-CCNC: 19 U/L (ref 15–37)
ATRIAL RATE: 102 BPM
BACTERIA URNS QL MICRO: NEGATIVE /HPF
BASOPHILS # BLD: 0.1 K/UL (ref 0–0.1)
BASOPHILS # BLD: 0.1 K/UL (ref 0–0.1)
BASOPHILS NFR BLD: 1 % (ref 0–1)
BASOPHILS NFR BLD: 1 % (ref 0–1)
BILIRUB SERPL-MCNC: 0.2 MG/DL (ref 0.2–1)
BILIRUB UR QL: NEGATIVE
BNP SERPL-MCNC: 380 PG/ML
BUN SERPL-MCNC: 31 MG/DL (ref 6–20)
BUN/CREAT SERPL: 18 (ref 12–20)
CALCIUM SERPL-MCNC: 8.5 MG/DL (ref 8.5–10.1)
CALCULATED P AXIS, ECG09: 61 DEGREES
CALCULATED R AXIS, ECG10: -18 DEGREES
CALCULATED T AXIS, ECG11: 102 DEGREES
CHLORIDE SERPL-SCNC: 104 MMOL/L (ref 97–108)
CK MB CFR SERPL CALC: 0.6 % (ref 0–2.5)
CK MB SERPL-MCNC: 2.3 NG/ML (ref 5–25)
CK SERPL-CCNC: 370 U/L (ref 26–192)
CO2 SERPL-SCNC: 27 MMOL/L (ref 21–32)
COLOR UR: ABNORMAL
CREAT SERPL-MCNC: 1.75 MG/DL (ref 0.55–1.02)
DIAGNOSIS, 93000: NORMAL
DIFFERENTIAL METHOD BLD: ABNORMAL
DIFFERENTIAL METHOD BLD: ABNORMAL
ECHO AV AREA PEAK VELOCITY: 1.9 CM2
ECHO AV PEAK GRADIENT: 8.8 MMHG
ECHO AV PEAK VELOCITY: 148.49 CM/S
ECHO EST RA PRESSURE: 5 MMHG
ECHO LV E' LATERAL VELOCITY: 4.46 CM/S
ECHO LV E' SEPTAL VELOCITY: 5.43 CM/S
ECHO LV INTERNAL DIMENSION DIASTOLIC: 4.67 CM (ref 3.9–5.3)
ECHO LV INTERNAL DIMENSION SYSTOLIC: 2.86 CM
ECHO LV IVSD: 0.94 CM (ref 0.6–0.9)
ECHO LV MASS 2D: 178.7 G (ref 67–162)
ECHO LV MASS INDEX 2D: 85.2 G/M2 (ref 43–95)
ECHO LV POSTERIOR WALL DIASTOLIC: 0.98 CM (ref 0.6–0.9)
ECHO LVOT DIAM: 1.84 CM
ECHO LVOT PEAK GRADIENT: 4.4 MMHG
ECHO LVOT PEAK VELOCITY: 104.29 CM/S
ECHO MV A VELOCITY: 155.23 CM/S
ECHO MV AREA PHT: 5.2 CM2
ECHO MV E DECELERATION TIME (DT): 144.6 MS
ECHO MV E VELOCITY: 124.13 CM/S
ECHO MV E/A RATIO: 0.8
ECHO MV E/E' LATERAL: 27.83
ECHO MV E/E' RATIO (AVERAGED): 25.35
ECHO MV E/E' SEPTAL: 22.86
ECHO MV PRESSURE HALF TIME (PHT): 41.9 MS
ECHO RIGHT VENTRICULAR SYSTOLIC PRESSURE (RVSP): 17.9 MMHG
ECHO RVOT PEAK VELOCITY: 96.79 CM/S
ECHO TV REGURGITANT MAX VELOCITY: 179.34 CM/S
ECHO TV REGURGITANT PEAK GRADIENT: 12.9 MMHG
EOSINOPHIL # BLD: 0.6 K/UL (ref 0–0.4)
EOSINOPHIL # BLD: 0.6 K/UL (ref 0–0.4)
EOSINOPHIL NFR BLD: 6 % (ref 0–7)
EOSINOPHIL NFR BLD: 7 % (ref 0–7)
EPITH CASTS URNS QL MICRO: ABNORMAL /LPF
ERYTHROCYTE [DISTWIDTH] IN BLOOD BY AUTOMATED COUNT: 13.3 % (ref 11.5–14.5)
ERYTHROCYTE [DISTWIDTH] IN BLOOD BY AUTOMATED COUNT: 14.5 % (ref 11.5–14.5)
EST. AVERAGE GLUCOSE BLD GHB EST-MCNC: 246 MG/DL
GLOBULIN SER CALC-MCNC: 4.5 G/DL (ref 2–4)
GLUCOSE BLD STRIP.AUTO-MCNC: 285 MG/DL (ref 65–100)
GLUCOSE BLD STRIP.AUTO-MCNC: 291 MG/DL (ref 65–100)
GLUCOSE BLD STRIP.AUTO-MCNC: 299 MG/DL (ref 65–100)
GLUCOSE BLD STRIP.AUTO-MCNC: 307 MG/DL (ref 65–100)
GLUCOSE SERPL-MCNC: 325 MG/DL (ref 65–100)
GLUCOSE UR STRIP.AUTO-MCNC: 500 MG/DL
HBA1C MFR BLD: 10.2 % (ref 4.2–6.3)
HCT VFR BLD AUTO: 35 % (ref 35–47)
HCT VFR BLD AUTO: 35.1 % (ref 35–47)
HGB BLD-MCNC: 10.9 G/DL (ref 11.5–16)
HGB BLD-MCNC: 11.2 G/DL (ref 11.5–16)
HGB UR QL STRIP: ABNORMAL
HYALINE CASTS URNS QL MICRO: ABNORMAL /LPF (ref 0–5)
IMM GRANULOCYTES # BLD AUTO: 0 K/UL (ref 0–0.04)
IMM GRANULOCYTES # BLD AUTO: 0 K/UL (ref 0–0.04)
IMM GRANULOCYTES NFR BLD AUTO: 0 % (ref 0–0.5)
IMM GRANULOCYTES NFR BLD AUTO: 1 % (ref 0–0.5)
KETONES UR QL STRIP.AUTO: NEGATIVE MG/DL
LEUKOCYTE ESTERASE UR QL STRIP.AUTO: NEGATIVE
LYMPHOCYTES # BLD: 3.2 K/UL (ref 0.8–3.5)
LYMPHOCYTES # BLD: 3.3 K/UL (ref 0.8–3.5)
LYMPHOCYTES NFR BLD: 33 % (ref 12–49)
LYMPHOCYTES NFR BLD: 37 % (ref 12–49)
MAGNESIUM SERPL-MCNC: 2.1 MG/DL (ref 1.6–2.4)
MCH RBC QN AUTO: 31.9 PG (ref 26–34)
MCH RBC QN AUTO: 32.6 PG (ref 26–34)
MCHC RBC AUTO-ENTMCNC: 31.1 G/DL (ref 30–36.5)
MCHC RBC AUTO-ENTMCNC: 32 G/DL (ref 30–36.5)
MCV RBC AUTO: 105.1 FL (ref 80–99)
MCV RBC AUTO: 99.7 FL (ref 80–99)
MONOCYTES # BLD: 0.6 K/UL (ref 0–1)
MONOCYTES # BLD: 0.6 K/UL (ref 0–1)
MONOCYTES NFR BLD: 6 % (ref 5–13)
MONOCYTES NFR BLD: 7 % (ref 5–13)
NEUTS SEG # BLD: 4.2 K/UL (ref 1.8–8)
NEUTS SEG # BLD: 5.4 K/UL (ref 1.8–8)
NEUTS SEG NFR BLD: 47 % (ref 32–75)
NEUTS SEG NFR BLD: 55 % (ref 32–75)
NITRITE UR QL STRIP.AUTO: NEGATIVE
NRBC # BLD: 0 K/UL (ref 0–0.01)
NRBC # BLD: 0 K/UL (ref 0–0.01)
NRBC BLD-RTO: 0 PER 100 WBC
NRBC BLD-RTO: 0 PER 100 WBC
P-R INTERVAL, ECG05: 152 MS
PH UR STRIP: 6 [PH] (ref 5–8)
PHOSPHATE SERPL-MCNC: 4.3 MG/DL (ref 2.6–4.7)
PLATELET # BLD AUTO: 277 K/UL (ref 150–400)
PLATELET # BLD AUTO: 283 K/UL (ref 150–400)
PMV BLD AUTO: 10.2 FL (ref 8.9–12.9)
PMV BLD AUTO: 9.7 FL (ref 8.9–12.9)
POTASSIUM SERPL-SCNC: 3.3 MMOL/L (ref 3.5–5.1)
PROT SERPL-MCNC: 7 G/DL (ref 6.4–8.2)
PROT UR STRIP-MCNC: 300 MG/DL
Q-T INTERVAL, ECG07: 396 MS
QRS DURATION, ECG06: 88 MS
QTC CALCULATION (BEZET), ECG08: 516 MS
RBC # BLD AUTO: 3.34 M/UL (ref 3.8–5.2)
RBC # BLD AUTO: 3.51 M/UL (ref 3.8–5.2)
RBC #/AREA URNS HPF: ABNORMAL /HPF (ref 0–5)
SERVICE CMNT-IMP: ABNORMAL
SODIUM SERPL-SCNC: 137 MMOL/L (ref 136–145)
SP GR UR REFRACTOMETRY: 1.01 (ref 1–1.03)
THERAPEUTIC RANGE,PTTT: NORMAL SECS (ref 58–77)
TROPONIN I SERPL-MCNC: 0.05 NG/ML
TROPONIN I SERPL-MCNC: 0.07 NG/ML
TSH SERPL DL<=0.05 MIU/L-ACNC: 1.7 UIU/ML (ref 0.36–3.74)
UROBILINOGEN UR QL STRIP.AUTO: 1 EU/DL (ref 0.2–1)
VENTRICULAR RATE, ECG03: 102 BPM
WBC # BLD AUTO: 10 K/UL (ref 3.6–11)
WBC # BLD AUTO: 8.8 K/UL (ref 3.6–11)
WBC URNS QL MICRO: ABNORMAL /HPF (ref 0–4)

## 2019-04-25 PROCEDURE — 93306 TTE W/DOPPLER COMPLETE: CPT

## 2019-04-25 PROCEDURE — 74011000250 HC RX REV CODE- 250: Performed by: INTERNAL MEDICINE

## 2019-04-25 PROCEDURE — 93970 EXTREMITY STUDY: CPT

## 2019-04-25 PROCEDURE — 82962 GLUCOSE BLOOD TEST: CPT

## 2019-04-25 PROCEDURE — 74011000250 HC RX REV CODE- 250

## 2019-04-25 PROCEDURE — 84443 ASSAY THYROID STIM HORMONE: CPT

## 2019-04-25 PROCEDURE — 36415 COLL VENOUS BLD VENIPUNCTURE: CPT

## 2019-04-25 PROCEDURE — 74011636637 HC RX REV CODE- 636/637: Performed by: INTERNAL MEDICINE

## 2019-04-25 PROCEDURE — 84484 ASSAY OF TROPONIN QUANT: CPT

## 2019-04-25 PROCEDURE — 74011000250 HC RX REV CODE- 250: Performed by: EMERGENCY MEDICINE

## 2019-04-25 PROCEDURE — 94664 DEMO&/EVAL PT USE INHALER: CPT

## 2019-04-25 PROCEDURE — 94640 AIRWAY INHALATION TREATMENT: CPT

## 2019-04-25 PROCEDURE — 85730 THROMBOPLASTIN TIME PARTIAL: CPT

## 2019-04-25 PROCEDURE — 84100 ASSAY OF PHOSPHORUS: CPT

## 2019-04-25 PROCEDURE — 83735 ASSAY OF MAGNESIUM: CPT

## 2019-04-25 PROCEDURE — 65660000000 HC RM CCU STEPDOWN

## 2019-04-25 PROCEDURE — 74011250636 HC RX REV CODE- 250/636: Performed by: INTERNAL MEDICINE

## 2019-04-25 PROCEDURE — 77030029684 HC NEB SM VOL KT MONA -A

## 2019-04-25 PROCEDURE — 82550 ASSAY OF CK (CPK): CPT

## 2019-04-25 PROCEDURE — 81001 URINALYSIS AUTO W/SCOPE: CPT

## 2019-04-25 PROCEDURE — 85025 COMPLETE CBC W/AUTO DIFF WBC: CPT

## 2019-04-25 PROCEDURE — 74011250637 HC RX REV CODE- 250/637: Performed by: INTERNAL MEDICINE

## 2019-04-25 PROCEDURE — 83036 HEMOGLOBIN GLYCOSYLATED A1C: CPT

## 2019-04-25 RX ORDER — ONDANSETRON 2 MG/ML
4 INJECTION INTRAMUSCULAR; INTRAVENOUS
Status: DISCONTINUED | OUTPATIENT
Start: 2019-04-25 | End: 2019-04-28 | Stop reason: HOSPADM

## 2019-04-25 RX ORDER — IPRATROPIUM BROMIDE AND ALBUTEROL SULFATE 2.5; .5 MG/3ML; MG/3ML
3 SOLUTION RESPIRATORY (INHALATION)
Status: DISCONTINUED | OUTPATIENT
Start: 2019-04-25 | End: 2019-04-28 | Stop reason: HOSPADM

## 2019-04-25 RX ORDER — IBUPROFEN 200 MG
1 TABLET ORAL DAILY
Status: DISCONTINUED | OUTPATIENT
Start: 2019-04-25 | End: 2019-04-28 | Stop reason: HOSPADM

## 2019-04-25 RX ORDER — INSULIN LISPRO 100 [IU]/ML
INJECTION, SOLUTION INTRAVENOUS; SUBCUTANEOUS
Status: DISCONTINUED | OUTPATIENT
Start: 2019-04-25 | End: 2019-04-28 | Stop reason: HOSPADM

## 2019-04-25 RX ORDER — DEXTROSE 50 % IN WATER (D50W) INTRAVENOUS SYRINGE
12.5-25 AS NEEDED
Status: DISCONTINUED | OUTPATIENT
Start: 2019-04-25 | End: 2019-04-28 | Stop reason: HOSPADM

## 2019-04-25 RX ORDER — NIFEDIPINE 60 MG/1
60 TABLET, EXTENDED RELEASE ORAL DAILY
Status: DISCONTINUED | OUTPATIENT
Start: 2019-04-25 | End: 2019-04-28 | Stop reason: HOSPADM

## 2019-04-25 RX ORDER — SODIUM CHLORIDE 0.9 % (FLUSH) 0.9 %
5-40 SYRINGE (ML) INJECTION EVERY 8 HOURS
Status: DISCONTINUED | OUTPATIENT
Start: 2019-04-25 | End: 2019-04-28 | Stop reason: HOSPADM

## 2019-04-25 RX ORDER — BISACODYL 5 MG
5 TABLET, DELAYED RELEASE (ENTERIC COATED) ORAL DAILY PRN
Status: DISCONTINUED | OUTPATIENT
Start: 2019-04-25 | End: 2019-04-28 | Stop reason: HOSPADM

## 2019-04-25 RX ORDER — ALBUTEROL SULFATE 0.83 MG/ML
SOLUTION RESPIRATORY (INHALATION)
Status: COMPLETED
Start: 2019-04-25 | End: 2019-04-25

## 2019-04-25 RX ORDER — MAGNESIUM SULFATE 100 %
4 CRYSTALS MISCELLANEOUS AS NEEDED
Status: DISCONTINUED | OUTPATIENT
Start: 2019-04-25 | End: 2019-04-28 | Stop reason: HOSPADM

## 2019-04-25 RX ORDER — MONTELUKAST SODIUM 10 MG/1
10 TABLET ORAL
Status: DISCONTINUED | OUTPATIENT
Start: 2019-04-25 | End: 2019-04-28 | Stop reason: HOSPADM

## 2019-04-25 RX ORDER — BUMETANIDE 0.5 MG/1
0.5 TABLET ORAL 2 TIMES DAILY
Status: DISCONTINUED | OUTPATIENT
Start: 2019-04-25 | End: 2019-04-25

## 2019-04-25 RX ORDER — FLUTICASONE PROPIONATE AND SALMETEROL 250; 50 UG/1; UG/1
1 POWDER RESPIRATORY (INHALATION)
Status: DISCONTINUED | OUTPATIENT
Start: 2019-04-25 | End: 2019-04-25 | Stop reason: CLARIF

## 2019-04-25 RX ORDER — ASPIRIN 325 MG
325 TABLET ORAL DAILY
Status: DISCONTINUED | OUTPATIENT
Start: 2019-04-25 | End: 2019-04-28 | Stop reason: HOSPADM

## 2019-04-25 RX ORDER — ACETAMINOPHEN 325 MG/1
650 TABLET ORAL
Status: DISCONTINUED | OUTPATIENT
Start: 2019-04-25 | End: 2019-04-28 | Stop reason: HOSPADM

## 2019-04-25 RX ORDER — FUROSEMIDE 10 MG/ML
60 INJECTION INTRAMUSCULAR; INTRAVENOUS EVERY 8 HOURS
Status: DISCONTINUED | OUTPATIENT
Start: 2019-04-25 | End: 2019-04-27

## 2019-04-25 RX ORDER — ROSUVASTATIN CALCIUM 10 MG/1
20 TABLET, COATED ORAL
Status: DISCONTINUED | OUTPATIENT
Start: 2019-04-25 | End: 2019-04-28 | Stop reason: HOSPADM

## 2019-04-25 RX ORDER — BUDESONIDE 0.5 MG/2ML
500 INHALANT ORAL
Status: DISCONTINUED | OUTPATIENT
Start: 2019-04-25 | End: 2019-04-28 | Stop reason: HOSPADM

## 2019-04-25 RX ORDER — BUMETANIDE 0.25 MG/ML
1 INJECTION INTRAMUSCULAR; INTRAVENOUS
Status: COMPLETED | OUTPATIENT
Start: 2019-04-25 | End: 2019-04-25

## 2019-04-25 RX ORDER — HEPARIN SODIUM 10000 [USP'U]/100ML
9-25 INJECTION, SOLUTION INTRAVENOUS
Status: DISCONTINUED | OUTPATIENT
Start: 2019-04-25 | End: 2019-04-25

## 2019-04-25 RX ORDER — HEPARIN SODIUM 5000 [USP'U]/ML
4000 INJECTION, SOLUTION INTRAVENOUS; SUBCUTANEOUS ONCE
Status: COMPLETED | OUTPATIENT
Start: 2019-04-25 | End: 2019-04-25

## 2019-04-25 RX ORDER — METOPROLOL SUCCINATE 50 MG/1
100 TABLET, EXTENDED RELEASE ORAL DAILY
Status: DISCONTINUED | OUTPATIENT
Start: 2019-04-25 | End: 2019-04-28 | Stop reason: HOSPADM

## 2019-04-25 RX ORDER — BUMETANIDE 0.25 MG/ML
1 INJECTION INTRAMUSCULAR; INTRAVENOUS EVERY 12 HOURS
Status: DISCONTINUED | OUTPATIENT
Start: 2019-04-25 | End: 2019-04-25

## 2019-04-25 RX ORDER — ARFORMOTEROL TARTRATE 15 UG/2ML
15 SOLUTION RESPIRATORY (INHALATION)
Status: DISCONTINUED | OUTPATIENT
Start: 2019-04-25 | End: 2019-04-28 | Stop reason: HOSPADM

## 2019-04-25 RX ORDER — SODIUM CHLORIDE 0.9 % (FLUSH) 0.9 %
5-40 SYRINGE (ML) INJECTION AS NEEDED
Status: DISCONTINUED | OUTPATIENT
Start: 2019-04-25 | End: 2019-04-28 | Stop reason: HOSPADM

## 2019-04-25 RX ADMIN — ALBUTEROL SULFATE 1 DOSE: 2.5 SOLUTION RESPIRATORY (INHALATION) at 00:15

## 2019-04-25 RX ADMIN — Medication 10 ML: at 21:38

## 2019-04-25 RX ADMIN — Medication 10 ML: at 13:53

## 2019-04-25 RX ADMIN — INSULIN LISPRO 7 UNITS: 100 INJECTION, SOLUTION INTRAVENOUS; SUBCUTANEOUS at 12:45

## 2019-04-25 RX ADMIN — MONTELUKAST SODIUM 10 MG: 10 TABLET, FILM COATED ORAL at 05:06

## 2019-04-25 RX ADMIN — HEPARIN SODIUM AND DEXTROSE 9 UNITS/KG/HR: 10000; 5 INJECTION INTRAVENOUS at 05:59

## 2019-04-25 RX ADMIN — INSULIN LISPRO 5 UNITS: 100 INJECTION, SOLUTION INTRAVENOUS; SUBCUTANEOUS at 17:28

## 2019-04-25 RX ADMIN — Medication 10 ML: at 05:09

## 2019-04-25 RX ADMIN — BUDESONIDE 500 MCG: 0.5 INHALANT RESPIRATORY (INHALATION) at 20:51

## 2019-04-25 RX ADMIN — METOPROLOL SUCCINATE 100 MG: 50 TABLET, EXTENDED RELEASE ORAL at 10:36

## 2019-04-25 RX ADMIN — MONTELUKAST SODIUM 10 MG: 10 TABLET, FILM COATED ORAL at 21:37

## 2019-04-25 RX ADMIN — INSULIN LISPRO 5 UNITS: 100 INJECTION, SOLUTION INTRAVENOUS; SUBCUTANEOUS at 10:37

## 2019-04-25 RX ADMIN — ARFORMOTEROL TARTRATE 15 MCG: 15 SOLUTION RESPIRATORY (INHALATION) at 20:51

## 2019-04-25 RX ADMIN — ALBUTEROL SULFATE 2.5 MG: 2.5 SOLUTION RESPIRATORY (INHALATION) at 00:19

## 2019-04-25 RX ADMIN — BUMETANIDE 1 MG: 0.25 INJECTION INTRAMUSCULAR; INTRAVENOUS at 01:16

## 2019-04-25 RX ADMIN — INSULIN LISPRO 3 UNITS: 100 INJECTION, SOLUTION INTRAVENOUS; SUBCUTANEOUS at 21:52

## 2019-04-25 RX ADMIN — FUROSEMIDE 60 MG: 10 INJECTION, SOLUTION INTRAMUSCULAR; INTRAVENOUS at 13:55

## 2019-04-25 RX ADMIN — ACETAMINOPHEN 650 MG: 325 TABLET ORAL at 05:06

## 2019-04-25 RX ADMIN — ROSUVASTATIN CALCIUM 20 MG: 10 TABLET, COATED ORAL at 05:06

## 2019-04-25 RX ADMIN — ROSUVASTATIN CALCIUM 20 MG: 10 TABLET, COATED ORAL at 21:37

## 2019-04-25 RX ADMIN — NIFEDIPINE 60 MG: 60 TABLET, FILM COATED, EXTENDED RELEASE ORAL at 10:37

## 2019-04-25 RX ADMIN — ASPIRIN 325 MG: 325 TABLET ORAL at 10:36

## 2019-04-25 RX ADMIN — ARFORMOTEROL TARTRATE 15 MCG: 15 SOLUTION RESPIRATORY (INHALATION) at 07:42

## 2019-04-25 RX ADMIN — FUROSEMIDE 60 MG: 10 INJECTION, SOLUTION INTRAMUSCULAR; INTRAVENOUS at 21:38

## 2019-04-25 RX ADMIN — HEPARIN SODIUM 4000 UNITS: 5000 INJECTION INTRAVENOUS; SUBCUTANEOUS at 06:01

## 2019-04-25 RX ADMIN — BUDESONIDE 500 MCG: 0.5 INHALANT RESPIRATORY (INHALATION) at 07:42

## 2019-04-25 NOTE — PROGRESS NOTES
Bedside shift change report given to Zerita Phoenix (oncoming nurse) by Yaima Rivera RN (offgoing nurse). Report included the following information SBAR, Kardex, ED Summary, Procedure Summary, Intake/Output, MAR, Accordion and Recent Results.

## 2019-04-25 NOTE — PROGRESS NOTES
Problem: Pressure Injury - Risk of  Goal: *Prevention of pressure injury  Description  Document Waldo Scale and appropriate interventions in the flowsheet. Outcome: Progressing Towards Goal     Problem: Patient Education: Go to Patient Education Activity  Goal: Patient/Family Education  Outcome: Progressing Towards Goal     Problem: Pain  Goal: *Control of Pain  Outcome: Progressing Towards Goal     Problem: Patient Education: Go to Patient Education Activity  Goal: Patient/Family Education  Outcome: Progressing Towards Goal     Problem: Falls - Risk of  Goal: *Absence of Falls  Description  Document Lawanda President Fall Risk and appropriate interventions in the flowsheet.   Outcome: Progressing Towards Goal     Problem: Patient Education: Go to Patient Education Activity  Goal: Patient/Family Education  Outcome: Progressing Towards Goal     Problem: Patient Education: Go to Patient Education Activity  Goal: Patient/Family Education  Outcome: Progressing Towards Goal     Problem: Heart Failure: Day 1  Goal: Activity/Safety  Outcome: Progressing Towards Goal  Goal: Consults, if ordered  Outcome: Progressing Towards Goal  Goal: Diagnostic Test/Procedures  Outcome: Progressing Towards Goal  Goal: Nutrition/Diet  Outcome: Progressing Towards Goal  Goal: Discharge Planning  Outcome: Progressing Towards Goal  Goal: Medications  Outcome: Progressing Towards Goal  Goal: Respiratory  Outcome: Progressing Towards Goal  Goal: Treatments/Interventions/Procedures  Outcome: Progressing Towards Goal  Goal: Psychosocial  Outcome: Progressing Towards Goal  Goal: *Oxygen saturation within defined limits  Outcome: Progressing Towards Goal  Goal: *Hemodynamically stable  Outcome: Progressing Towards Goal  Goal: *Optimal pain control at patient's stated goal  Outcome: Progressing Towards Goal  Goal: *Anxiety reduced or absent  Outcome: Progressing Towards Goal     Problem: Heart Failure: Discharge Outcomes  Goal: *Demonstrates ability to perform prescribed activity without shortness of breath or discomfort  Outcome: Progressing Towards Goal  Goal: *Left ventricular function assessment completed prior to or during stay, or planned for post-discharge  Outcome: Progressing Towards Goal  Goal: *Verbalizes understanding and describes prescribed diet  Outcome: Progressing Towards Goal  Goal: *Verbalizes understanding/describes prescribed medications  Outcome: Progressing Towards Goal  Goal: *Describes available resources and support systems  Description  (eg: Home Health, Palliative Care, Advanced Medical Directive)  Outcome: Progressing Towards Goal  Goal: *Describes smoking cessation resources  Outcome: Progressing Towards Goal  Goal: *Understands and describes signs and symptoms to report to providers(Stroke Metric)  Outcome: Progressing Towards Goal  Goal: *Describes/verbalizes understanding of follow-up/return appt  Description  (eg: to physicians, diabetes treatment coordinator, and other resources  Outcome: Progressing Towards Goal  Goal: *Describes importance of continuing daily weights and changes to report to physician  Outcome: Progressing Towards Goal

## 2019-04-25 NOTE — DIABETES MGMT
Please consider adding an A1C to next blood draw. Thank you, [facility]  Ramón Mccormick, SISSY, CDE

## 2019-04-25 NOTE — ROUTINE PROCESS
TRANSFER - OUT REPORT:    Verbal report given to Apolinar Leal RN(name) on [employer]  being transferred to (unit) for routine progression of care  Report consisted of patients Situation, Background, Assessment and   Recommendations(SBAR). Information from the following report(s) SBAR, ED Summary, Recent Results and Cardiac Rhythm Sinus Tach was reviewed with the receiving nurse. Lines:   Peripheral IV 04/24/19 Left Antecubital (Active)   Site Assessment Clean, dry, & intact 4/24/2019 11:58 PM   Phlebitis Assessment 0 4/24/2019 11:58 PM   Infiltration Assessment 0 4/24/2019 11:58 PM   Dressing Status Clean, dry, & intact 4/24/2019 11:58 PM   Dressing Type Transparent 4/24/2019 11:58 PM        Opportunity for questions and clarification was provided.       Patient transported with:   Monitor  Registered Nurse     Catherine Torres RN

## 2019-04-25 NOTE — ED TRIAGE NOTES
Pt ambulatory to department from home c/o bilateral leg numbness since last night and leg swelling/tightness.

## 2019-04-25 NOTE — CONSULTS
CARDIOLOGY CONSULT                  Subjective:    Date of  Admission: 2019 11:15 PM     Admission type:Emergency    Claudine Mi is a 58 y.o. female admitted for NSTEMI (non-ST elevated myocardial infarction) ([facility] [I21.4]. She has chronic SHARRI but it has worsened and she was developing cramps and tingling so she came to the eD. She had stents many years ago after an episode of chest pain but there have been no similar complaints. Has had worsening SOB for weeks. ED workup showed a mildly elevated troponin and BNP. CXR was unremarkable. Patient Active Problem List    Diagnosis Date Noted    Chest pain at rest 2014     Priority: 1 - One    ASHD (arteriosclerotic heart disease) 2014     Priority: 1 - One    Diabetes mellitus ([facility] 2014     Priority: 2 - Two    Hypertension, benign 2014     Priority: 2 - Two    ASO (arteriosclerosis obliterans) 2014     Priority: 2 - Two    Hypercholesteremia 2014     Priority: 3 - Three    NSTEMI (non-ST elevated myocardial infarction) ([facility] 2019    Bilateral leg edema 03/15/2018      Veronica Tejada MD  Past Medical History:   Diagnosis Date    Asthma     CAD (coronary artery disease)     CHF (congestive heart failure) ([facility]     COPD (chronic obstructive pulmonary disease) ([facility]     Diabetes ([facility]     Hypertension       Past Surgical History:   Procedure Laterality Date    CARDIAC SURG PROCEDURE UNLIST      DELIVERY       HX CORONARY STENT PLACEMENT      HX HYSTERECTOMY      VASCULAR SURGERY PROCEDURE UNLIST      leg stent     Allergies   Allergen Reactions    Ivp Dye [Fd And C Blue No.1] Hives      History reviewed. No pertinent family history.    Current Facility-Administered Medications   Medication Dose Route Frequency    acetaminophen (TYLENOL) tablet 650 mg  650 mg Oral Q4H PRN    albuterol-ipratropium (DUO-NEB) 2.5 MG-0.5 MG/3 ML  3 mL Nebulization Q4H PRN    aspirin tablet 325 mg  325 mg Oral DAILY    metoprolol succinate (TOPROL-XL) XL tablet 100 mg  100 mg Oral DAILY    montelukast (SINGULAIR) tablet 10 mg  10 mg Oral QHS    NIFEdipine ER (PROCARDIA XL) tablet 60 mg  60 mg Oral DAILY    rosuvastatin (CRESTOR) tablet 20 mg  20 mg Oral QHS    sodium chloride (NS) flush 5-40 mL  5-40 mL IntraVENous Q8H    sodium chloride (NS) flush 5-40 mL  5-40 mL IntraVENous PRN    ondansetron (ZOFRAN) injection 4 mg  4 mg IntraVENous Q4H PRN    bisacodyl (DULCOLAX) tablet 5 mg  5 mg Oral DAILY PRN    insulin lispro (HUMALOG) injection   SubCUTAneous AC&HS    glucose chewable tablet 16 g  4 Tab Oral PRN    dextrose (D50W) injection syrg 12.5-25 g  12.5-25 g IntraVENous PRN    glucagon (GLUCAGEN) injection 1 mg  1 mg IntraMUSCular PRN    arformoterol (BROVANA) neb solution 15 mcg  15 mcg Nebulization BID RT    And    budesonide (PULMICORT) 500 mcg/2 ml nebulizer suspension  500 mcg Nebulization BID RT    nicotine (NICODERM CQ) 21 mg/24 hr patch 1 Patch  1 Patch TransDERmal DAILY    furosemide (LASIX) injection 60 mg  60 mg IntraVENous Q8H         Review of Symptoms:  Gen - no F/C/S  Eyes - no vision changes  ENT - no sore throat, rhinorrhea, otalgia  CV - no CP, no palpitations, no orthopnea, no PND, + SHARRI  Resp no cough, no SOB/TIPTON  GI - no AP, no n/v/d/c   - no dysuria, no hematuria  MSK - no abnormal joint pains  Skin - no rashes  Neuro - no HA, no numbness, no weakness, no slurred speech  Psych - no change in mood         Physical Exam    Visit Vitals  /78   Pulse (!) 104   Temp 98.6 °F (37 °C)   Resp 18   Ht 5' 4\" (1.626 m)   Wt 107.2 kg (236 lb 5.3 oz)   SpO2 94%   Breastfeeding? No   BMI 40.57 kg/m²     NAD  Skin warm and dry  Nl conjunctiva  Oropharynx without exudate.     Neck supple  Lungs clear  Normal S1/ S2 with occasional ectopy  Abdomen soft and non tender  Pulses 2+ radials  ++SHARRI  Neuro:  Grossly intact  Appropriate      Cardiographics    Telemetry: normal sinus rhythm  ECG: nonspecific ST and T waves changes, sinus tachycardia  Echocardiogram: pending    Labs:   Recent Results (from the past 24 hour(s))   EKG, 12 LEAD, INITIAL    Collection Time: 04/24/19 11:55 PM   Result Value Ref Range    Ventricular Rate 102 BPM    Atrial Rate 102 BPM    P-R Interval 152 ms    QRS Duration 88 ms    Q-T Interval 396 ms    QTC Calculation (Bezet) 516 ms    Calculated P Axis 61 degrees    Calculated R Axis -18 degrees    Calculated T Axis 102 degrees    Diagnosis       Sinus tachycardia  Nonspecific T wave abnormality  When compared with ECG of 02-JUL-2018 00:57,  No significant change was found     CBC WITH AUTOMATED DIFF    Collection Time: 04/24/19 11:59 PM   Result Value Ref Range    WBC 8.8 3.6 - 11.0 K/uL    RBC 3.51 (L) 3.80 - 5.20 M/uL    HGB 11.2 (L) 11.5 - 16.0 g/dL    HCT 35.0 35.0 - 47.0 %    MCV 99.7 (H) 80.0 - 99.0 FL    MCH 31.9 26.0 - 34.0 PG    MCHC 32.0 30.0 - 36.5 g/dL    RDW 14.5 11.5 - 14.5 %    PLATELET 454 530 - 922 K/uL    MPV 9.7 8.9 - 12.9 FL    NRBC 0.0 0  WBC    ABSOLUTE NRBC 0.00 0.00 - 0.01 K/uL    NEUTROPHILS 47 32 - 75 %    LYMPHOCYTES 37 12 - 49 %    MONOCYTES 7 5 - 13 %    EOSINOPHILS 7 0 - 7 %    BASOPHILS 1 0 - 1 %    IMMATURE GRANULOCYTES 1 (H) 0.0 - 0.5 %    ABS. NEUTROPHILS 4.2 1.8 - 8.0 K/UL    ABS. LYMPHOCYTES 3.3 0.8 - 3.5 K/UL    ABS. MONOCYTES 0.6 0.0 - 1.0 K/UL    ABS. EOSINOPHILS 0.6 (H) 0.0 - 0.4 K/UL    ABS. BASOPHILS 0.1 0.0 - 0.1 K/UL    ABS. IMM.  GRANS. 0.0 0.00 - 0.04 K/UL    DF AUTOMATED     METABOLIC PANEL, COMPREHENSIVE    Collection Time: 04/24/19 11:59 PM   Result Value Ref Range    Sodium 137 136 - 145 mmol/L    Potassium 3.3 (L) 3.5 - 5.1 mmol/L    Chloride 104 97 - 108 mmol/L    CO2 27 21 - 32 mmol/L    Anion gap 6 5 - 15 mmol/L    Glucose 325 (H) 65 - 100 mg/dL    BUN 31 (H) 6 - 20 MG/DL    Creatinine 1.75 (H) 0.55 - 1.02 MG/DL    BUN/Creatinine ratio 18 12 - 20      GFR est AA 35 (L) >60 ml/min/1.73m2    GFR est non-AA 29 (L) >60 ml/min/1.73m2    Calcium 8.5 8.5 - 10.1 MG/DL    Bilirubin, total 0.2 0.2 - 1.0 MG/DL    ALT (SGPT) 18 12 - 78 U/L    AST (SGOT) 19 15 - 37 U/L    Alk. phosphatase 131 (H) 45 - 117 U/L    Protein, total 7.0 6.4 - 8.2 g/dL    Albumin 2.5 (L) 3.5 - 5.0 g/dL    Globulin 4.5 (H) 2.0 - 4.0 g/dL    A-G Ratio 0.6 (L) 1.1 - 2.2     NT-PRO BNP    Collection Time: 04/24/19 11:59 PM   Result Value Ref Range    NT pro- (H) <125 PG/ML   TROPONIN I    Collection Time: 04/24/19 11:59 PM   Result Value Ref Range    Troponin-I, Qt. 0.07 (H) <0.05 ng/mL   PTT    Collection Time: 04/25/19  3:26 AM   Result Value Ref Range    aPTT 24.4 22.1 - 32.0 sec    aPTT, therapeutic range     58.0 - 77.0 SECS   CBC WITH AUTOMATED DIFF    Collection Time: 04/25/19  6:25 AM   Result Value Ref Range    WBC 10.0 3.6 - 11.0 K/uL    RBC 3.34 (L) 3.80 - 5.20 M/uL    HGB 10.9 (L) 11.5 - 16.0 g/dL    HCT 35.1 35.0 - 47.0 %    .1 (H) 80.0 - 99.0 FL    MCH 32.6 26.0 - 34.0 PG    MCHC 31.1 30.0 - 36.5 g/dL    RDW 13.3 11.5 - 14.5 %    PLATELET 492 132 - 318 K/uL    MPV 10.2 8.9 - 12.9 FL    NRBC 0.0 0  WBC    ABSOLUTE NRBC 0.00 0.00 - 0.01 K/uL    NEUTROPHILS 55 32 - 75 %    LYMPHOCYTES 33 12 - 49 %    MONOCYTES 6 5 - 13 %    EOSINOPHILS 6 0 - 7 %    BASOPHILS 1 0 - 1 %    IMMATURE GRANULOCYTES 0 0.0 - 0.5 %    ABS. NEUTROPHILS 5.4 1.8 - 8.0 K/UL    ABS. LYMPHOCYTES 3.2 0.8 - 3.5 K/UL    ABS. MONOCYTES 0.6 0.0 - 1.0 K/UL    ABS. EOSINOPHILS 0.6 (H) 0.0 - 0.4 K/UL    ABS. BASOPHILS 0.1 0.0 - 0.1 K/UL    ABS. IMM.  GRANS. 0.0 0.00 - 0.04 K/UL    DF AUTOMATED     URINALYSIS W/MICROSCOPIC    Collection Time: 04/25/19  6:25 AM   Result Value Ref Range    Color YELLOW/STRAW      Appearance CLEAR CLEAR      Specific gravity 1.013 1.003 - 1.030      pH (UA) 6.0 5.0 - 8.0      Protein 300 (A) NEG mg/dL    Glucose 500 (A) NEG mg/dL    Ketone NEGATIVE  NEG mg/dL    Bilirubin NEGATIVE  NEG      Blood SMALL (A) NEG      Urobilinogen 1.0 0.2 - 1.0 EU/dL    Nitrites NEGATIVE  NEG      Leukocyte Esterase NEGATIVE  NEG      WBC 0-4 0 - 4 /hpf    RBC 0-5 0 - 5 /hpf    Epithelial cells FEW FEW /lpf    Bacteria NEGATIVE  NEG /hpf    Hyaline cast 0-2 0 - 5 /lpf   MAGNESIUM    Collection Time: 04/25/19  6:25 AM   Result Value Ref Range    Magnesium 2.1 1.6 - 2.4 mg/dL   PHOSPHORUS    Collection Time: 04/25/19  6:25 AM   Result Value Ref Range    Phosphorus 4.3 2.6 - 4.7 MG/DL   TROPONIN I    Collection Time: 04/25/19  6:25 AM   Result Value Ref Range    Troponin-I, Qt. 0.05 (H) <0.05 ng/mL   CK W/ CKMB & INDEX    Collection Time: 04/25/19  6:25 AM   Result Value Ref Range     (H) 26 - 192 U/L    CK - MB 2.3 <3.6 NG/ML    CK-MB Index 0.6 0.0 - 2.5     TSH 3RD GENERATION    Collection Time: 04/25/19  6:25 AM   Result Value Ref Range    TSH 1.70 0.36 - 3.74 uIU/mL   GLUCOSE, POC    Collection Time: 04/25/19  7:50 AM   Result Value Ref Range    Glucose (POC) 291 (H) 65 - 100 mg/dL    Performed by Maru Mayorga and Plan: This is a 58 yof with MMP here for edema and dyspnea and consulted for elevated troponin. Unclear etiology of troponin but unlikely ACS.  Now downtrending    Cont with lasix as you are doing  Echo ordered  No need to check further troponins or CK-MB  Will stop heparin  Cont other cardiac meds  BMP in AM  NPO at MN for ischemic testing to risk stratify for troponin, plan on stress testing but will proceed with cardiac cath if LVEF down     Thank you for this consult, please call with questions     Assessment:

## 2019-04-25 NOTE — CARDIO/PULMONARY
Cardiac Rehab: Per EMR, patient is a current smoker.  Smoking Cessation Program information placed on the AVS.    Royce Enrique RN

## 2019-04-25 NOTE — PROGRESS NOTES
Problem: Pressure Injury - Risk of  Goal: *Prevention of pressure injury  Description  Document Waldo Scale and appropriate interventions in the flowsheet. Outcome: Progressing Towards Goal     Problem: Patient Education: Go to Patient Education Activity  Goal: Patient/Family Education  Outcome: Progressing Towards Goal     Problem: Pain  Goal: *Control of Pain  Outcome: Progressing Towards Goal     Problem: Patient Education: Go to Patient Education Activity  Goal: Patient/Family Education  Outcome: Progressing Towards Goal     Problem: Falls - Risk of  Goal: *Absence of Falls  Description  Document Brant Lozada Fall Risk and appropriate interventions in the flowsheet.   Outcome: Progressing Towards Goal     Problem: Patient Education: Go to Patient Education Activity  Goal: Patient/Family Education  Outcome: Progressing Towards Goal     Problem: Patient Education: Go to Patient Education Activity  Goal: Patient/Family Education  Outcome: Progressing Towards Goal     Problem: Heart Failure: Day 1  Goal: Activity/Safety  Outcome: Progressing Towards Goal  Goal: Consults, if ordered  Outcome: Progressing Towards Goal  Goal: Diagnostic Test/Procedures  Outcome: Progressing Towards Goal  Goal: Nutrition/Diet  Outcome: Progressing Towards Goal  Goal: Discharge Planning  Outcome: Progressing Towards Goal  Goal: Medications  Outcome: Progressing Towards Goal  Goal: Respiratory  Outcome: Progressing Towards Goal  Goal: Treatments/Interventions/Procedures  Outcome: Progressing Towards Goal  Goal: Psychosocial  Outcome: Progressing Towards Goal  Goal: *Oxygen saturation within defined limits  Outcome: Progressing Towards Goal  Goal: *Hemodynamically stable  Outcome: Progressing Towards Goal  Goal: *Optimal pain control at patient's stated goal  Outcome: Progressing Towards Goal  Goal: *Anxiety reduced or absent  Outcome: Progressing Towards Goal     Problem: Heart Failure: Discharge Outcomes  Goal: *Demonstrates ability to perform prescribed activity without shortness of breath or discomfort  Outcome: Progressing Towards Goal  Goal: *Left ventricular function assessment completed prior to or during stay, or planned for post-discharge  Outcome: Progressing Towards Goal  Goal: *ACEI prescribed if LVEF less than 40% and no contraindications or ARB prescribed  Outcome: Progressing Towards Goal  Goal: *Verbalizes understanding and describes prescribed diet  Outcome: Progressing Towards Goal  Goal: *Verbalizes understanding/describes prescribed medications  Outcome: Progressing Towards Goal  Goal: *Describes available resources and support systems  Description  (eg: Home Health, Palliative Care, Advanced Medical Directive)  Outcome: Progressing Towards Goal  Goal: *Describes smoking cessation resources  Outcome: Progressing Towards Goal  Goal: *Understands and describes signs and symptoms to report to providers(Stroke Metric)  Outcome: Progressing Towards Goal  Goal: *Describes/verbalizes understanding of follow-up/return appt  Description  (eg: to physicians, diabetes treatment coordinator, and other resources  Outcome: Progressing Towards Goal  Goal: *Describes importance of continuing daily weights and changes to report to physician  Outcome: Progressing Towards Goal     Problem: Diabetes Self-Management  Goal: *Disease process and treatment process  Description  Define diabetes and identify own type of diabetes; list 3 options for treating diabetes. Outcome: Progressing Towards Goal  Goal: *Incorporating nutritional management into lifestyle  Description  Describe effect of type, amount and timing of food on blood glucose; list 3 methods for planning meals. Outcome: Progressing Towards Goal  Goal: *Incorporating physical activity into lifestyle  Description  State effect of exercise on blood glucose levels.   Outcome: Progressing Towards Goal  Goal: *Developing strategies to promote health/change behavior  Description  Define the ABC's of diabetes; identify appropriate screenings, schedule and personal plan for screenings. Outcome: Progressing Towards Goal  Goal: *Using medications safely  Description  State effect of diabetes medications on diabetes; name diabetes medication taking, action and side effects. Outcome: Progressing Towards Goal  Goal: *Monitoring blood glucose, interpreting and using results  Description  Identify recommended blood glucose targets  and personal targets. Outcome: Progressing Towards Goal  Goal: *Prevention, detection, treatment of acute complications  Description  List symptoms of hyper- and hypoglycemia; describe how to treat low blood sugar and actions for lowering  high blood glucose level. Outcome: Progressing Towards Goal  Goal: *Prevention, detection and treatment of chronic complications  Description  Define the natural course of diabetes and describe the relationship of blood glucose levels to long term complications of diabetes.   Outcome: Progressing Towards Goal  Goal: *Developing strategies to address psychosocial issues  Description  Describe feelings about living with diabetes; identify support needed and support network  Outcome: Progressing Towards Goal  Goal: *Insulin pump training  Outcome: Progressing Towards Goal  Goal: *Sick day guidelines  Outcome: Progressing Towards Goal  Goal: *Patient Specific Goal (EDIT GOAL, INSERT TEXT)  Outcome: Progressing Towards Goal     Problem: Patient Education: Go to Patient Education Activity  Goal: Patient/Family Education  Outcome: Progressing Towards Goal

## 2019-04-25 NOTE — ED PROVIDER NOTES
58 y.o. female with past medical history significant for HTN, DM, CAD, CHF, and COPD who presents via private vehicle from home accompanied by her sisters with chief complaint of leg swelling. Patient arrives c/o bilateral leg swelling up to her thighs for several months, worsening last night with new onset bilateral leg numbness and left leg pain - reports previous onset of right leg pain. Patient endorses compliance with her bumex (0.5mg BID). Patient also reports urinary and bowel incontinence but notes this is chronic. Patient reports new onset abdominal distention but denies pain and Hx of liver problems. Patient reports increased SOB over the past several weeks - endorses Hx of COPD with chronic cough. Patient notes she has an appointment with her PCP scheduled for 19. Patient denies back pain. Patient is not anticoagulated. There are no other acute medical concerns at this time. Social hx: Current every day tobacco smoker (0.25ppd); Denies EtOH use; Denies illicit drug use  PCP: Ro Lemon MD  Cards: Dr. Sally Nava    Note written by Pradip Portillo, as dictated by Yoel Dang MD 11:39 PM           Past Medical History:   Diagnosis Date    Asthma     CAD (coronary artery disease)     [facility] [address]     COPD [facility]chronic obstructive pulmonary disease) [facility] [address]     Diabetes ([facility] [address]     Hypertension        Past Surgical History:   Procedure Laterality Date    CARDIAC SURG PROCEDURE UNLIST      DELIVERY       HX CORONARY STENT PLACEMENT      HX HYSTERECTOMY      VASCULAR SURGERY PROCEDURE UNLIST      leg stent         History reviewed. No pertinent family history.     Social History     Socioeconomic History    Marital status: SINGLE     Spouse name: Not on file    Number of children: Not on file    Years of education: Not on file    Highest education level: Not on file   Occupational History    Not on file   Social Needs    Financial resource strain: Not on file    Food insecurity:     Worry: Not on file     Inability: Not on file    Transportation needs:     Medical: Not on file     Non-medical: Not on file   Tobacco Use    Smoking status: Current Every Day Smoker     Packs/day: 0.25   Substance and Sexual Activity    Alcohol use: No    Drug use: Not on file    Sexual activity: Not on file   Lifestyle    Physical activity:     Days per week: Not on file     Minutes per session: Not on file    Stress: Not on file   Relationships    Social connections:     Talks on phone: Not on file     Gets together: Not on file     Attends Yazdanism service: Not on file     Active member of club or organization: Not on file     Attends meetings of clubs or organizations: Not on file     Relationship status: Not on file    Intimate partner violence:     Fear of current or ex partner: Not on file     Emotionally abused: Not on file     Physically abused: Not on file     Forced sexual activity: Not on file   Other Topics Concern    Not on file   Social History Narrative    Not on file         ALLERGIES: Ivp dye [fd and c blue no. 1]    Review of Systems   Constitutional: Negative for appetite change and fever. HENT: Negative for congestion, nosebleeds and sore throat. Eyes: Negative for discharge. Respiratory: Positive for shortness of breath (increased). Negative for cough. Cardiovascular: Positive for leg swelling (bilateral legs up to thighs). Negative for chest pain. Gastrointestinal: Positive for abdominal distention. Negative for abdominal pain, diarrhea, nausea and vomiting. Genitourinary: Negative for dysuria. Musculoskeletal: Negative. Negative for back pain. +left leg pain   Skin: Negative for rash. Neurological: Positive for numbness (bilateral legs up to thighs). Negative for weakness and headaches. Hematological: Negative for adenopathy. Psychiatric/Behavioral: Negative.     All other systems reviewed and are negative. Vitals:    04/24/19 2322   BP: 194/84   Pulse: (!) 103   Resp: 16   Temp: 98.6 °F (37 °C)   SpO2: 98%            Physical Exam   Constitutional: She is oriented to person, place, and time. She appears well-developed and well-nourished. HENT:   Head: Normocephalic and atraumatic. Mouth/Throat: Oropharynx is clear and moist.   Eyes: Conjunctivae are normal.   Neck: Normal range of motion. Neck supple. Cardiovascular: Normal rate, regular rhythm and normal heart sounds. Pulmonary/Chest: Effort normal. She has wheezes. Abdominal: Soft. Bowel sounds are normal. There is no tenderness. Musculoskeletal: Normal range of motion. She exhibits edema (4+ edema in legs. Edema goes up to abdomen). She exhibits no tenderness. Neurological: She is alert and oriented to person, place, and time. Skin: Skin is warm and dry. Psychiatric: She has a normal mood and affect. Her behavior is normal.   Nursing note and vitals reviewed. Note written by Pradip Shahid, as dictated by Robbi Cardona MD 11:39 PM    MDM       Procedures    ED EKG interpretation:  Rhythm: sinus tachycardia; and regular . Rate (approx.): 102; Axis: normal; P wave: normal; QRS interval: normal ; ST/T wave: non-specific changes; EKG documented Interpreted by Robbi Cardona MD, ED MD.     A/P:  1. Elevated troponin - on aspirin. Admit for further evaluation. 2. Anasarca - bumex given    Hospitalist [place] for Admission  1:12 AM - Dr. Cindy Morales    ED Room Number: ER07/07  Patient Name and age:  Saima Mijares 58 y.o.  female  Working Diagnosis:   1. Elevated troponin    2. Anasarca    3. Cough      Readmission: no  Isolation Requirements:  no  Recommended Level of Care:  telemetry  Code Status:  full  Other:  Venous duplex ordered and pending.

## 2019-04-25 NOTE — PROGRESS NOTES
Bedside and Verbal shift change report given to Conchita Chinchilla RN (oncoming nurse) by Myriam Aleman RN (offgoing nurse).  Report included the following information SBAR, Kardex, Intake/Output, MAR, Recent Results, Med Rec Status and Cardiac Rhythm SR.

## 2019-04-25 NOTE — H&P
[address] [facility]  HISTORY AND PHYSICAL    Name:  Jude Ortiz  MR#:  [id number]  :  1955  ACCOUNT #:  [de-identified]  ADMIT DATE:  2019      PRIMARY CARE PHYSICIAN:  Randall Love MD    SOURCE OF INFORMATION:  Patient. CHIEF COMPLAINT:  Bilateral leg swelling. HISTORY OF PRESENT ILLNESS:  This is a 24-year-old woman with a past medical history significant for coronary artery disease status post stent placement, chronic diastolic congestive heart failure, dyslipidemia, hypertension, type 2 diabetes, COPD, obstructive sleep apnea, pulmonary nodules, was in her usual state of health until a couple of weeks ago when the patient started experiencing worsening of the bilateral leg swelling. The patient has peripheral artery disease status post stent placement in the lower extremity. The swelling is progressive, associated with pain. The patient stated that she has been taking a diuretic for congestive heart failure. The swelling is up to the upper thighs. No history of liver disease. The patient also complained of chest pain, but stated that she did not tell anybody about it. The chest pain is located at the center of the chest.  The patient described the chest pain as a dull ache with no radiation. No known aggravating or relieving factors, about 5/10 in severity. The patient has COPD, not on oxygen at home. She has been having progressive shortness of breath as well. No history of fever, no rigors, no chills. The patient was last admitted to this hospital in 2018. The patient was admitted for evaluation and treatment of shortness of breath attributed to COPD exacerbation. When the patient arrived at the emergency room, she was found to have elevated troponin level. The patient received breathing treatment, was referred to the Hospitalist Service for evaluation for admission.     PAST MEDICAL HISTORY:  Coronary artery disease status post stent placement, chronic diastolic congestive heart failure, dyslipidemia, hypertension, type 2 diabetes, COPD, obstructive sleep apnea , pulmonary nodules. PAST SURGICAL HISTORY:  Significant for cardiac stent placement, hysterectomy, and stent placement in the leg. ALLERGIES:  THE PATIENT IS ALLERGIC TO IV DYE. CURRENT MEDICATIONS:  DuoNeb nebulizer every 6 hours as needed, aspirin 325 mg daily Bumex 0.5 mg twice daily, Advair 250/50 inhalation twice daily, insulin 70/30 45 units subcutaneously twice daily, lisinopril 40 mg daily, Toprol  mg daily, Singulair 10 mg daily at bedtime, Procardia XL 60 mg daily, Crestor 20 mg daily. SOCIAL HISTORY:  The patient smokes less than half a pack of cigarettes daily. Denies alcohol abuse. FAMILY HISTORY:  This was reviewed. Her mother had hypertension. REVIEW OF SYSTEMS:  HEAD, EYES, EARS, NOSE AND THROAT:  No headache, no dizziness, no blurring of vision, no photophobia. RESPIRATORY:  This is positive for shortness of breath and cough. No hemoptysis. CARDIOVASCULAR:  This is positive for chest pain, no orthopnea, no palpitations. GASTROINTESTINAL:  No nausea or vomiting. No diarrhea. No constipation. GENITOURINARY:  No dysuria, no urgency and no frequency. All other systems are reviewed and they are negative. PHYSICAL EXAMINATION:  GENERAL APPEARANCE:  The patient appeared ill in moderate distress. VITAL SIGNS:  On arrival at the emergency room, temperature 98.6, pulse 103, respiratory rate 16, blood pressure 194/84, oxygen saturation 98% on room air. HEAD:  Normocephalic, atraumatic. EYES:  Normal eye movements. No redness, no drainage, no discharge. EARS:  Normal external ears with no evidence of drainage. NOSE:  No deformity, no drainage. MOUTH AND THROAT:  No visible oral lesions. NECK:  Neck is supple. No JVD, no thyromegaly. CHEST:  Few expiratory wheezing and few bilateral basilar crackles. HEART:  Normal S1 and S2.  Regular.   No clinically appreciable murmur. ABDOMEN:  Soft and nontender. Normal bowel sounds. CNS:  Alert, oriented x3. No gross or focal neurological deficit. EXTREMITIES:  Edema 2+. Pulses 2+ bilaterally. MUSCULOSKELETAL SYSTEM:  Bilateral lower extremity swelling noted and present on admission. SKIN:  No active skin lesions seen in the exposed part of the body. PSYCHIATRIC:  Normal mood and affect. LYMPHATIC SYSTEM:  No cervical lymphadenopathy. DIAGNOSTIC DATA:  EKG shows sinus tachycardia and nonspecific T-wave abnormalities. Chest x-ray:  No acute findings. LABORATORY DATA:  Hematology:  WBC 8.8, hemoglobin 11.3, hematocrit 35, platelets of 406. Pro-. Chemistry:  Sodium 137, potassium 3.3, chloride at 104, CO2 27, glucose 325, BUN 31, creatinine 1.75, calcium 8.5, total bilirubin 0.2, ALT 18, AST 19, alkaline phosphatase 131, total protein at 7, albumin level 2.5, globulin at 4.5. Cardiac profile:  troponin 0.07.    ASSESSMENT:  1. Suspected non-ST elevation myocardial infarction. 2.  Chronic diastolic congestive heart failure. 3.  Dyslipidemia. 4.  Hypertension. 5.  Type 2 diabetes. 6.  Chronic obstructive pulmonary disease. 7.  Acute renal failure. 8.  Obstructive sleep apnea. 9.  Tobacco abuse. 10.  Pulmonary nodules. 11.  Bilateral leg swelling and pain. 12.  Hypokalemia. PLAN:  1. Suspected non-ST elevation myocardial infarction. We will admit the patient for further evaluation and treatment. This is a known patient with coronary artery disease who is status post stent placement, presenting with bilateral leg swelling, shortness of breath and chest pain with elevated troponin level. We will start the patient on full-dose heparin, beta-blocker and aspirin until when the patient is seen by the cardiologist to determine whether the patient has non-ST elevation myocardial infarction or not.   At that time, the present therapy can then be adjusted by the cardiologist.  We will trend troponin level. We will obtain an echocardiogram.  We will await further recommendation from the cardiologist.  2.  Chronic diastolic congestive heart failure. We will continue with Bumex. We will monitor the patient closely. We will await further recommendation from the cardiologist.  We will also await echocardiogram.  3.  Dyslipidemia. We will resume home medication. We will check lipid profile. 4.  Hypertension. We will continue with preadmission medication. 5.  Type 2 diabetes. The patient will be placed on sliding scale with insulin coverage. We will check hemoglobin A1c level if one has not been checked recently. 6.  COPD. We will continue with DuoNeb as well as supplemental oxygen. The patient does not appear to be in acute exacerbation of COPD. 7.  Acute renal failure. We will monitor the patient's renal function. This could be as a result of the patient's diuretic, it has worsened the renal function. The patient may require a nephrology consult and a renal ultrasound. 8.  Obstructive sleep apnea. We will place the patient on CPAP. The patient has not been able to obtain a CPAP at home because of insurance issues. 9.  Tobacco abuse. The patient is advised to quit smoking. We will place the patient on Nicoderm patch. 10.  Pulmonary nodules. The patient will continue to follow up with her pulmonologist for monitoring of the pulmonary nodules. The last CT scan of the chest was done this January and shows stable pulmonary nodules. 11.  Bilateral leg swelling and pain. We will await the result of ultrasound done in the emergency room to evaluate the patient for DVT. 12.  Hypokalemia. We will replace her potassium and repeat her potassium level. We will also check magnesium level. 13.  Other issues. Code status: The patient is a full code. The patient is on full-dose heparin, because of that, there is no need for DVT prophylaxis.     FUNCTIONAL STATUS PRIOR TO ADMISSION: The patient is ambulatory without assistance or device. Wm Molina MD      [id number]  D:  04/25/2019 7:05  T:  04/25/2019 7:21  JOB #:  [id number]  CC:   Magdalena Ghotra MD

## 2019-04-25 NOTE — PROGRESS NOTES
This is a 58-year-old woman with a past medical history significant for coronary artery disease status post stent placement, chronic diastolic congestive heart failure, dyslipidemia, hypertension, type 2 diabetes, COPD, obstructive sleep apnea, pulmonary nodules, was in her usual state of health until a couple of weeks ago when the patient started experiencing worsening of the bilateral leg swelling    Acute on chronic diastolic heart failure  - presented with bilateral leg swelling abd abdominal swelling  - bnp 380  - CXR: no acute changes  - venous duplex: neg DVT  - echo ordered  - started on IV lasix 60mg q8h    Suspected NSTEMI  - no chest pain now  - no significant EKG changes  - mild elevation of troponin  - cardiology planning for Stress test tomorrow  - dced heparin drip    BRENNAN  - cr 1.75 on POA, baseline 1  - avoid nephrotoxins  - will monitor renal function

## 2019-04-26 ENCOUNTER — APPOINTMENT (OUTPATIENT)
Dept: NON INVASIVE DIAGNOSTICS | Age: 64
DRG: 291 | End: 2019-04-26
Attending: INTERNAL MEDICINE
Payer: COMMERCIAL

## 2019-04-26 ENCOUNTER — HOSPITAL ENCOUNTER (INPATIENT)
Dept: NUCLEAR MEDICINE | Age: 64
Discharge: HOME | DRG: 291 | End: 2019-04-26
Attending: INTERNAL MEDICINE
Payer: COMMERCIAL

## 2019-04-26 LAB
ALBUMIN SERPL-MCNC: 2.3 G/DL (ref 3.5–5)
ALBUMIN/GLOB SERPL: 0.6 {RATIO} (ref 1.1–2.2)
ALP SERPL-CCNC: 107 U/L (ref 45–117)
ALT SERPL-CCNC: 16 U/L (ref 12–78)
ANION GAP SERPL CALC-SCNC: 6 MMOL/L (ref 5–15)
AST SERPL-CCNC: 29 U/L (ref 15–37)
BASOPHILS # BLD: 0.1 K/UL (ref 0–0.1)
BASOPHILS NFR BLD: 1 % (ref 0–1)
BILIRUB SERPL-MCNC: 0.4 MG/DL (ref 0.2–1)
BUN SERPL-MCNC: 26 MG/DL (ref 6–20)
BUN/CREAT SERPL: 21 (ref 12–20)
CALCIUM SERPL-MCNC: 8.2 MG/DL (ref 8.5–10.1)
CHLORIDE SERPL-SCNC: 103 MMOL/L (ref 97–108)
CO2 SERPL-SCNC: 28 MMOL/L (ref 21–32)
CREAT SERPL-MCNC: 1.23 MG/DL (ref 0.55–1.02)
DIFFERENTIAL METHOD BLD: ABNORMAL
EOSINOPHIL # BLD: 0.7 K/UL (ref 0–0.4)
EOSINOPHIL NFR BLD: 8 % (ref 0–7)
ERYTHROCYTE [DISTWIDTH] IN BLOOD BY AUTOMATED COUNT: 14.2 % (ref 11.5–14.5)
GLOBULIN SER CALC-MCNC: 4 G/DL (ref 2–4)
GLUCOSE BLD STRIP.AUTO-MCNC: 298 MG/DL (ref 65–100)
GLUCOSE BLD STRIP.AUTO-MCNC: 382 MG/DL (ref 65–100)
GLUCOSE BLD STRIP.AUTO-MCNC: 383 MG/DL (ref 65–100)
GLUCOSE BLD STRIP.AUTO-MCNC: 394 MG/DL (ref 65–100)
GLUCOSE BLD STRIP.AUTO-MCNC: 402 MG/DL (ref 65–100)
GLUCOSE SERPL-MCNC: 316 MG/DL (ref 65–100)
HCT VFR BLD AUTO: 32.6 % (ref 35–47)
HGB BLD-MCNC: 10.7 G/DL (ref 11.5–16)
IMM GRANULOCYTES # BLD AUTO: 0 K/UL (ref 0–0.04)
IMM GRANULOCYTES NFR BLD AUTO: 0 % (ref 0–0.5)
LYMPHOCYTES # BLD: 2.6 K/UL (ref 0.8–3.5)
LYMPHOCYTES NFR BLD: 32 % (ref 12–49)
MCH RBC QN AUTO: 32.8 PG (ref 26–34)
MCHC RBC AUTO-ENTMCNC: 32.8 G/DL (ref 30–36.5)
MCV RBC AUTO: 100 FL (ref 80–99)
MONOCYTES # BLD: 0.6 K/UL (ref 0–1)
MONOCYTES NFR BLD: 7 % (ref 5–13)
NEUTS SEG # BLD: 4.2 K/UL (ref 1.8–8)
NEUTS SEG NFR BLD: 52 % (ref 32–75)
NRBC # BLD: 0 K/UL (ref 0–0.01)
NRBC BLD-RTO: 0 PER 100 WBC
PLATELET # BLD AUTO: 256 K/UL (ref 150–400)
PMV BLD AUTO: 9.8 FL (ref 8.9–12.9)
POTASSIUM SERPL-SCNC: 2.9 MMOL/L (ref 3.5–5.1)
PROT SERPL-MCNC: 6.3 G/DL (ref 6.4–8.2)
RBC # BLD AUTO: 3.26 M/UL (ref 3.8–5.2)
SERVICE CMNT-IMP: ABNORMAL
SODIUM SERPL-SCNC: 137 MMOL/L (ref 136–145)
STRESS BASELINE HR: 103 BPM
STRESS ESTIMATED WORKLOAD: 1 METS
STRESS EXERCISE DUR MIN: NORMAL
STRESS PEAK DIAS BP: 93 MMHG
STRESS PEAK SYS BP: 184 MMHG
STRESS PERCENT HR ACHIEVED: 85 %
STRESS POST PEAK HR: 133 BPM
STRESS RATE PRESSURE PRODUCT: NORMAL BPM*MMHG
STRESS ST DEPRESSION: 0 MM
STRESS ST ELEVATION: 0 MM
STRESS TARGET HR: 156 BPM
WBC # BLD AUTO: 8.1 K/UL (ref 3.6–11)

## 2019-04-26 PROCEDURE — 94640 AIRWAY INHALATION TREATMENT: CPT

## 2019-04-26 PROCEDURE — 74011250637 HC RX REV CODE- 250/637: Performed by: INTERNAL MEDICINE

## 2019-04-26 PROCEDURE — A9500 TC99M SESTAMIBI: HCPCS

## 2019-04-26 PROCEDURE — 80053 COMPREHEN METABOLIC PANEL: CPT

## 2019-04-26 PROCEDURE — 74011250636 HC RX REV CODE- 250/636: Performed by: INTERNAL MEDICINE

## 2019-04-26 PROCEDURE — 94760 N-INVAS EAR/PLS OXIMETRY 1: CPT

## 2019-04-26 PROCEDURE — 74011636637 HC RX REV CODE- 636/637: Performed by: INTERNAL MEDICINE

## 2019-04-26 PROCEDURE — 82962 GLUCOSE BLOOD TEST: CPT

## 2019-04-26 PROCEDURE — 74011000250 HC RX REV CODE- 250: Performed by: INTERNAL MEDICINE

## 2019-04-26 PROCEDURE — 85025 COMPLETE CBC W/AUTO DIFF WBC: CPT

## 2019-04-26 PROCEDURE — 78452 HT MUSCLE IMAGE SPECT MULT: CPT

## 2019-04-26 PROCEDURE — 36415 COLL VENOUS BLD VENIPUNCTURE: CPT

## 2019-04-26 PROCEDURE — 65660000000 HC RM CCU STEPDOWN

## 2019-04-26 RX ORDER — INSULIN LISPRO 100 [IU]/ML
5 INJECTION, SOLUTION INTRAVENOUS; SUBCUTANEOUS
Status: DISCONTINUED | OUTPATIENT
Start: 2019-04-26 | End: 2019-04-28 | Stop reason: HOSPADM

## 2019-04-26 RX ORDER — INSULIN LISPRO 100 [IU]/ML
5 INJECTION, SOLUTION INTRAVENOUS; SUBCUTANEOUS ONCE
Status: COMPLETED | OUTPATIENT
Start: 2019-04-26 | End: 2019-04-26

## 2019-04-26 RX ORDER — INSULIN GLARGINE 100 [IU]/ML
35 INJECTION, SOLUTION SUBCUTANEOUS 2 TIMES DAILY
Status: DISCONTINUED | OUTPATIENT
Start: 2019-04-26 | End: 2019-04-27

## 2019-04-26 RX ORDER — POTASSIUM CHLORIDE 750 MG/1
40 TABLET, FILM COATED, EXTENDED RELEASE ORAL
Status: COMPLETED | OUTPATIENT
Start: 2019-04-26 | End: 2019-04-26

## 2019-04-26 RX ORDER — SODIUM CHLORIDE 0.9 % (FLUSH) 0.9 %
20 SYRINGE (ML) INJECTION
Status: COMPLETED | OUTPATIENT
Start: 2019-04-26 | End: 2019-04-26

## 2019-04-26 RX ORDER — INSULIN GLARGINE 100 [IU]/ML
30 INJECTION, SOLUTION SUBCUTANEOUS 2 TIMES DAILY
Status: DISCONTINUED | OUTPATIENT
Start: 2019-04-26 | End: 2019-04-26

## 2019-04-26 RX ORDER — POTASSIUM CHLORIDE 14.9 MG/ML
10 INJECTION INTRAVENOUS
Status: COMPLETED | OUTPATIENT
Start: 2019-04-26 | End: 2019-04-26

## 2019-04-26 RX ORDER — CAFFEINE CITRATE 20 MG/ML
60 SOLUTION INTRAVENOUS
Status: COMPLETED | OUTPATIENT
Start: 2019-04-26 | End: 2019-04-26

## 2019-04-26 RX ADMIN — Medication 10 ML: at 13:43

## 2019-04-26 RX ADMIN — POTASSIUM CHLORIDE 10 MEQ: 200 INJECTION, SOLUTION INTRAVENOUS at 11:30

## 2019-04-26 RX ADMIN — INSULIN GLARGINE 35 UNITS: 100 INJECTION, SOLUTION SUBCUTANEOUS at 17:05

## 2019-04-26 RX ADMIN — Medication 20 ML: at 09:28

## 2019-04-26 RX ADMIN — ROSUVASTATIN CALCIUM 20 MG: 10 TABLET, COATED ORAL at 21:57

## 2019-04-26 RX ADMIN — BUDESONIDE 500 MCG: 0.5 INHALANT RESPIRATORY (INHALATION) at 07:38

## 2019-04-26 RX ADMIN — REGADENOSON 0.4 MG: 0.08 INJECTION, SOLUTION INTRAVENOUS at 09:28

## 2019-04-26 RX ADMIN — ARFORMOTEROL TARTRATE 15 MCG: 15 SOLUTION RESPIRATORY (INHALATION) at 07:38

## 2019-04-26 RX ADMIN — ACETAMINOPHEN 650 MG: 325 TABLET ORAL at 00:22

## 2019-04-26 RX ADMIN — FUROSEMIDE 60 MG: 10 INJECTION, SOLUTION INTRAMUSCULAR; INTRAVENOUS at 21:56

## 2019-04-26 RX ADMIN — POTASSIUM CHLORIDE 10 MEQ: 200 INJECTION, SOLUTION INTRAVENOUS at 13:00

## 2019-04-26 RX ADMIN — INSULIN LISPRO 10 UNITS: 100 INJECTION, SOLUTION INTRAVENOUS; SUBCUTANEOUS at 11:30

## 2019-04-26 RX ADMIN — FUROSEMIDE 60 MG: 10 INJECTION, SOLUTION INTRAMUSCULAR; INTRAVENOUS at 05:20

## 2019-04-26 RX ADMIN — INSULIN LISPRO 5 UNITS: 100 INJECTION, SOLUTION INTRAVENOUS; SUBCUTANEOUS at 07:19

## 2019-04-26 RX ADMIN — INSULIN LISPRO 3 UNITS: 100 INJECTION, SOLUTION INTRAVENOUS; SUBCUTANEOUS at 21:58

## 2019-04-26 RX ADMIN — NIFEDIPINE 60 MG: 60 TABLET, FILM COATED, EXTENDED RELEASE ORAL at 09:00

## 2019-04-26 RX ADMIN — METOPROLOL SUCCINATE 100 MG: 50 TABLET, EXTENDED RELEASE ORAL at 09:00

## 2019-04-26 RX ADMIN — CAFFEINE CITRATE 60 MG: 20 INJECTION, SOLUTION INTRAVENOUS at 09:40

## 2019-04-26 RX ADMIN — POTASSIUM CHLORIDE 10 MEQ: 200 INJECTION, SOLUTION INTRAVENOUS at 08:27

## 2019-04-26 RX ADMIN — FUROSEMIDE 60 MG: 10 INJECTION, SOLUTION INTRAMUSCULAR; INTRAVENOUS at 13:42

## 2019-04-26 RX ADMIN — ARFORMOTEROL TARTRATE 15 MCG: 15 SOLUTION RESPIRATORY (INHALATION) at 22:03

## 2019-04-26 RX ADMIN — POTASSIUM CHLORIDE 10 MEQ: 200 INJECTION, SOLUTION INTRAVENOUS at 11:00

## 2019-04-26 RX ADMIN — BUDESONIDE 500 MCG: 0.5 INHALANT RESPIRATORY (INHALATION) at 22:02

## 2019-04-26 RX ADMIN — MONTELUKAST SODIUM 10 MG: 10 TABLET, FILM COATED ORAL at 21:57

## 2019-04-26 RX ADMIN — INSULIN LISPRO 5 UNITS: 100 INJECTION, SOLUTION INTRAVENOUS; SUBCUTANEOUS at 17:04

## 2019-04-26 RX ADMIN — INSULIN GLARGINE 30 UNITS: 100 INJECTION, SOLUTION SUBCUTANEOUS at 10:56

## 2019-04-26 RX ADMIN — POTASSIUM CHLORIDE 40 MEQ: 750 TABLET, EXTENDED RELEASE ORAL at 15:50

## 2019-04-26 RX ADMIN — Medication 10 ML: at 05:21

## 2019-04-26 RX ADMIN — INSULIN LISPRO 12 UNITS: 100 INJECTION, SOLUTION INTRAVENOUS; SUBCUTANEOUS at 17:04

## 2019-04-26 RX ADMIN — Medication 10 ML: at 21:57

## 2019-04-26 NOTE — PROGRESS NOTES
Hospitalist Progress Note      Hospital summary: This is a 60-year-old woman with a past medical history significant for coronary artery disease status post stent placement, chronic diastolic congestive heart failure, dyslipidemia, hypertension, type 2 diabetes, COPD, obstructive sleep apnea, pulmonary nodules, was in her usual state of health until a couple of weeks ago when the patient started experiencing worsening of the bilateral leg swelling 4/24/2019      Assessment/Plan:  Acute on chronic diastolic heart failure  - presented with bilateral leg swelling abd abdominal swelling  - bnp 380  - CXR: no acute changes  - venous duplex: neg DVT  - echo: EF 40-37%, grade 1 diastolic dysfunction  - c/w IV lasix 60mg q8h     Ruled out NSTEMI  - no chest pain now  - no significant EKG changes  - mild elevation of troponin  - cardiology on board  - dced heparin drip  - stress test : Negative      BRENNAN - improving  - cr 1.75 on POA, baseline 1  - avoid nephrotoxins  - will monitor renal function    Hypokalemia - replaced    Diabetes with hyperglycemia  - BG elevated  - patient takes Novolin 70/30 at home  - increased lantus 35u bid, ISS  - DM education    COPD-  C/w nebs  HTN - c/w nifedipine, metoprolol  HLD - statin  LAKIA-  CPAP at night  Tobacco abuse - nicotine patch. Counseled to quit    Code status: Full  DVT prophylaxis: SCDs  Disposition: TBD.   ----------------------------------------------    CC: leg swelling    S: Patient is seen and examined at bedside. She had stress test this AM. Leg swelling improving. Denied chest pain. Spoke with her daughter on phone and updated her. Review of Systems:  A comprehensive review of systems was negative. O:  Visit Vitals  /58 (BP 1 Location: Left arm, BP Patient Position: At rest)   Pulse 96   Temp 98.3 °F (36.8 °C)   Resp 15   Ht 5' 4\" (1.626 m)   Wt 107 kg (235 lb 14.3 oz)   SpO2 96%   Breastfeeding?  No   BMI 40.49 kg/m² PHYSICAL EXAM:  Gen: NAD  HEENT: anicteric sclerae, normal conjunctiva, oropharynx clear, MM moist  Neck: supple, trachea midline, no adenopathy  Heart: RRR, S1 s2 heard, 2 + edema  Lungs: decreased breath sounds at bases  Abd: soft, NT, ND, BS+, no organomegaly  Extr: warm  Skin: dry, no rash  Neuro: CN II-XII grossly intact, normal speech, moves all extremities  Psych: normal mood, appropriate affect      Intake/Output Summary (Last 24 hours) at 4/26/2019 1308  Last data filed at 4/26/2019 1254  Gross per 24 hour   Intake 1450 ml   Output 7100 ml   Net -5650 ml        Recent labs & imaging reviewed:  Recent Results (from the past 24 hour(s))   ECHO ADULT COMPLETE    Collection Time: 04/25/19  4:39 PM   Result Value Ref Range    LV E' Lateral Velocity 4.46 cm/s    LV E' Septal Velocity 5.43 cm/s    Aortic Valve Systolic Peak Velocity 162.30 cm/s    Aortic Valve Area by Continuity of Peak Velocity 1.9 cm2    AoV PG 8.8 mmHg    LVIDd 4.67 3.9 - 5.3 cm    LVPWd 0.98 (A) 0.6 - 0.9 cm    LVIDs 2.86 cm    IVSd 0.94 (A) 0.6 - 0.9 cm    LVOT d 1.84 cm    LVOT Peak Velocity 104.29 cm/s    LVOT Peak Gradient 4.4 mmHg    MVA (PHT) 5.2 cm2    MV A Jeremiah 155.23 cm/s    MV E Jeremiah 124.13 cm/s    MV E/A 0.80     Right Ventricular Outflow Track Peak Velocity 96.79 cm/s    LV Mass .7 (A) 67 - 162 g    LV Mass AL Index 85.2 43 - 95 g/m2    E/E' lateral 27.83     E/E' septal 22.86     RVSP 17.9 mmHg    E/E' ratio (averaged) 25.35     Est. RA Pressure 5.0 mmHg    Mitral Valve E Wave Deceleration Time 144.6 ms    Mitral Valve Pressure Half-time 41.9 ms    Triscuspid Valve Regurgitation Peak Gradient 12.9 mmHg    TR Max Velocity 179.34 cm/s   GLUCOSE, POC    Collection Time: 04/25/19  5:03 PM   Result Value Ref Range    Glucose (POC) 299 (H) 65 - 100 mg/dL    Performed by Eric Villasenor    GLUCOSE, POC    Collection Time: 04/25/19  8:51 PM   Result Value Ref Range    Glucose (POC) 285 (H) 65 - 100 mg/dL    Performed by Rosalie Hill Aarti    METABOLIC PANEL, COMPREHENSIVE    Collection Time: 04/26/19  1:48 AM   Result Value Ref Range    Sodium 137 136 - 145 mmol/L    Potassium 2.9 (L) 3.5 - 5.1 mmol/L    Chloride 103 97 - 108 mmol/L    CO2 28 21 - 32 mmol/L    Anion gap 6 5 - 15 mmol/L    Glucose 316 (H) 65 - 100 mg/dL    BUN 26 (H) 6 - 20 MG/DL    Creatinine 1.23 (H) 0.55 - 1.02 MG/DL    BUN/Creatinine ratio 21 (H) 12 - 20      GFR est AA 53 (L) >60 ml/min/1.73m2    GFR est non-AA 44 (L) >60 ml/min/1.73m2    Calcium 8.2 (L) 8.5 - 10.1 MG/DL    Bilirubin, total 0.4 0.2 - 1.0 MG/DL    ALT (SGPT) 16 12 - 78 U/L    AST (SGOT) 29 15 - 37 U/L    Alk. phosphatase 107 45 - 117 U/L    Protein, total 6.3 (L) 6.4 - 8.2 g/dL    Albumin 2.3 (L) 3.5 - 5.0 g/dL    Globulin 4.0 2.0 - 4.0 g/dL    A-G Ratio 0.6 (L) 1.1 - 2.2     CBC WITH AUTOMATED DIFF    Collection Time: 04/26/19  1:48 AM   Result Value Ref Range    WBC 8.1 3.6 - 11.0 K/uL    RBC 3.26 (L) 3.80 - 5.20 M/uL    HGB 10.7 (L) 11.5 - 16.0 g/dL    HCT 32.6 (L) 35.0 - 47.0 %    .0 (H) 80.0 - 99.0 FL    MCH 32.8 26.0 - 34.0 PG    MCHC 32.8 30.0 - 36.5 g/dL    RDW 14.2 11.5 - 14.5 %    PLATELET 058 333 - 003 K/uL    MPV 9.8 8.9 - 12.9 FL    NRBC 0.0 0  WBC    ABSOLUTE NRBC 0.00 0.00 - 0.01 K/uL    NEUTROPHILS 52 32 - 75 %    LYMPHOCYTES 32 12 - 49 %    MONOCYTES 7 5 - 13 %    EOSINOPHILS 8 (H) 0 - 7 %    BASOPHILS 1 0 - 1 %    IMMATURE GRANULOCYTES 0 0.0 - 0.5 %    ABS. NEUTROPHILS 4.2 1.8 - 8.0 K/UL    ABS. LYMPHOCYTES 2.6 0.8 - 3.5 K/UL    ABS. MONOCYTES 0.6 0.0 - 1.0 K/UL    ABS. EOSINOPHILS 0.7 (H) 0.0 - 0.4 K/UL    ABS. BASOPHILS 0.1 0.0 - 0.1 K/UL    ABS. IMM.  GRANS. 0.0 0.00 - 0.04 K/UL    DF AUTOMATED     GLUCOSE, POC    Collection Time: 04/26/19  7:09 AM   Result Value Ref Range    Glucose (POC) 394 (H) 65 - 100 mg/dL    Performed by Willian Bonilla [id number], POC    Collection Time: 04/26/19  8:45 AM   Result Value Ref Range    Glucose (POC) 382 (H) 65 - 100 mg/dL    Performed by Amilcar Townsend    NUCLEAR CARDIAC STRESS TEST    Collection Time: 04/26/19 10:43 AM   Result Value Ref Range    Target  bpm    Exercise duration time 00:03:00     Stress Base Systolic  mmHg    Stress Base Diastolic BP 93 mmHg    Post peak  BPM    Baseline  BPM    Estimated workload 1.0 METS    Percent HR 85 %    Stress Rate Pressure Product 24,472 BPM*mmHg    ST Elevation (mm) 0 mm    ST Depression (mm) 0 mm   GLUCOSE, POC    Collection Time: 04/26/19 11:18 AM   Result Value Ref Range    Glucose (POC) 402 (H) 65 - 100 mg/dL    Performed by Amilcar Townsend      Recent Labs     04/26/19 0148 04/25/19 0625   WBC 8.1 10.0   HGB 10.7* 10.9*   HCT 32.6* 35.1    277     Recent Labs     04/26/19 0148 04/25/19 0625 04/24/19 2359     --  137   K 2.9*  --  3.3*     --  104   CO2 28  --  27   BUN 26*  --  31*   CREA 1.23*  --  1.75*   *  --  325*   CA 8.2*  --  8.5   MG  --  2.1  --    PHOS  --  4.3  --      Recent Labs     04/26/19 0148 04/24/19 2359   SGOT 29 19   ALT 16 18    131*   TBILI 0.4 0.2   TP 6.3* 7.0   ALB 2.3* 2.5*   GLOB 4.0 4.5*     Recent Labs     04/25/19  0326   APTT 24.4      No results for input(s): FE, TIBC, PSAT, FERR in the last 72 hours. Lab Results   Component Value Date/Time    Folate 7.3 07/02/2018 04:27 AM      No results for input(s): PH, PCO2, PO2 in the last 72 hours.   Recent Labs     04/25/19 0625 04/24/19 2359   *  --    CKNDX 0.6  --    TROIQ 0.05* 0.07*     Lab Results   Component Value Date/Time    Cholesterol, total 260 (H) 07/02/2018 04:27 AM    HDL Cholesterol 31 07/02/2018 04:27 AM    LDL, calculated 197.2 (H) 07/02/2018 04:27 AM    Triglyceride 159 (H) 07/02/2018 04:27 AM    CHOL/HDL Ratio 8.4 (H) 07/02/2018 04:27 AM     Lab Results   Component Value Date/Time    Glucose (POC) 402 (H) 04/26/2019 11:18 AM    Glucose (POC) 382 (H) 04/26/2019 08:45 AM    Glucose (POC) 394 (H) 04/26/2019 07:09 AM    Glucose (POC) 285 (H) 04/25/2019 08:51 PM    Glucose (POC) 299 (H) 04/25/2019 05:03 PM     Lab Results   Component Value Date/Time    Color YELLOW/STRAW 04/25/2019 06:25 AM    Appearance CLEAR 04/25/2019 06:25 AM    Specific gravity 1.013 04/25/2019 06:25 AM    Specific gravity 1.010 06/08/2011 12:00 AM    pH (UA) 6.0 04/25/2019 06:25 AM    Protein 300 (A) 04/25/2019 06:25 AM    Glucose 500 (A) 04/25/2019 06:25 AM    Ketone NEGATIVE  04/25/2019 06:25 AM    Bilirubin NEGATIVE  04/25/2019 06:25 AM    Urobilinogen 1.0 04/25/2019 06:25 AM    Nitrites NEGATIVE  04/25/2019 06:25 AM    Leukocyte Esterase NEGATIVE  04/25/2019 06:25 AM    Epithelial cells FEW 04/25/2019 06:25 AM    Bacteria NEGATIVE  04/25/2019 06:25 AM    WBC 0-4 04/25/2019 06:25 AM    RBC 0-5 04/25/2019 06:25 AM       Med list reviewed  Current Facility-Administered Medications   Medication Dose Route Frequency    insulin glargine (LANTUS) injection 35 Units  35 Units SubCUTAneous BID    acetaminophen (TYLENOL) tablet 650 mg  650 mg Oral Q4H PRN    albuterol-ipratropium (DUO-NEB) 2.5 MG-0.5 MG/3 ML  3 mL Nebulization Q4H PRN    aspirin tablet 325 mg  325 mg Oral DAILY    metoprolol succinate (TOPROL-XL) XL tablet 100 mg  100 mg Oral DAILY    montelukast (SINGULAIR) tablet 10 mg  10 mg Oral QHS    NIFEdipine ER (PROCARDIA XL) tablet 60 mg  60 mg Oral DAILY    rosuvastatin (CRESTOR) tablet 20 mg  20 mg Oral QHS    sodium chloride (NS) flush 5-40 mL  5-40 mL IntraVENous Q8H    sodium chloride (NS) flush 5-40 mL  5-40 mL IntraVENous PRN    ondansetron (ZOFRAN) injection 4 mg  4 mg IntraVENous Q4H PRN    bisacodyl (DULCOLAX) tablet 5 mg  5 mg Oral DAILY PRN    insulin lispro (HUMALOG) injection   SubCUTAneous AC&HS    glucose chewable tablet 16 g  4 Tab Oral PRN    dextrose (D50W) injection syrg 12.5-25 g  12.5-25 g IntraVENous PRN    glucagon (GLUCAGEN) injection 1 mg  1 mg IntraMUSCular PRN    arformoterol (BROVANA) neb solution 15 mcg  15 mcg Nebulization BID RT    And    budesonide (PULMICORT) 500 mcg/2 ml nebulizer suspension  500 mcg Nebulization BID RT    nicotine (NICODERM CQ) 21 mg/24 hr patch 1 Patch  1 Patch TransDERmal DAILY    furosemide (LASIX) injection 60 mg  60 mg IntraVENous Q8H       Care Plan discussed with:  Patient/Family, Nurse and     Raimundo Rebollar MD  Internal Medicine  Date of Service: 4/26/2019

## 2019-04-26 NOTE — PROGRESS NOTES
Problem: Pressure Injury - Risk of  Goal: *Prevention of pressure injury  Description  Document Waldo Scale and appropriate interventions in the flowsheet. Outcome: Progressing Towards Goal     Problem: Patient Education: Go to Patient Education Activity  Goal: Patient/Family Education  Outcome: Progressing Towards Goal     Problem: Pain  Goal: *Control of Pain  Outcome: Progressing Towards Goal     Problem: Patient Education: Go to Patient Education Activity  Goal: Patient/Family Education  Outcome: Progressing Towards Goal     Problem: Falls - Risk of  Goal: *Absence of Falls  Description  Document [facility] Fall Risk and appropriate interventions in the flowsheet.   Outcome: Progressing Towards Goal     Problem: Patient Education: Go to Patient Education Activity  Goal: Patient/Family Education  Outcome: Progressing Towards Goal     Problem: Patient Education: Go to Patient Education Activity  Goal: Patient/Family Education  Outcome: Progressing Towards Goal     Problem: Heart Failure: Day 1  Goal: Off Pathway (Use only if patient is Off Pathway)  Outcome: Progressing Towards Goal  Goal: Activity/Safety  Outcome: Progressing Towards Goal  Goal: Consults, if ordered  Outcome: Progressing Towards Goal  Goal: Diagnostic Test/Procedures  Outcome: Progressing Towards Goal  Goal: Nutrition/Diet  Outcome: Progressing Towards Goal  Goal: Discharge Planning  Outcome: Progressing Towards Goal  Goal: Medications  Outcome: Progressing Towards Goal  Goal: Respiratory  Outcome: Progressing Towards Goal  Goal: Treatments/Interventions/Procedures  Outcome: Progressing Towards Goal  Goal: Psychosocial  Outcome: Progressing Towards Goal  Goal: *Oxygen saturation within defined limits  Outcome: Progressing Towards Goal  Goal: *Hemodynamically stable  Outcome: Progressing Towards Goal  Goal: *Optimal pain control at patient's stated goal  Outcome: Progressing Towards Goal  Goal: *Anxiety reduced or absent  Outcome: Progressing Towards Goal     Problem: Heart Failure: Discharge Outcomes  Goal: *Describes available resources and support systems  Description  (eg: Home Health, Palliative Care, Advanced Medical Directive)  Outcome: Progressing Towards Goal  Goal: *Describes smoking cessation resources  Outcome: Progressing Towards Goal  Goal: *Understands and describes signs and symptoms to report to providers(Stroke Metric)  Outcome: Progressing Towards Goal  Goal: *Describes/verbalizes understanding of follow-up/return appt  Description  (eg: to physicians, diabetes treatment coordinator, and other resources  Outcome: Progressing Towards Goal  Goal: *Describes importance of continuing daily weights and changes to report to physician  Outcome: Progressing Towards Goal     Problem: Diabetes Self-Management  Goal: *Disease process and treatment process  Description  Define diabetes and identify own type of diabetes; list 3 options for treating diabetes. Outcome: Progressing Towards Goal  Goal: *Incorporating nutritional management into lifestyle  Description  Describe effect of type, amount and timing of food on blood glucose; list 3 methods for planning meals. Outcome: Progressing Towards Goal  Goal: *Incorporating physical activity into lifestyle  Description  State effect of exercise on blood glucose levels. Outcome: Progressing Towards Goal  Goal: *Developing strategies to promote health/change behavior  Description  Define the ABC's of diabetes; identify appropriate screenings, schedule and personal plan for screenings. Outcome: Progressing Towards Goal  Goal: *Using medications safely  Description  State effect of diabetes medications on diabetes; name diabetes medication taking, action and side effects. Outcome: Progressing Towards Goal  Goal: *Monitoring blood glucose, interpreting and using results  Description  Identify recommended blood glucose targets  and personal targets.   Outcome: Progressing Towards Goal  Goal: *Prevention, detection, treatment of acute complications  Description  List symptoms of hyper- and hypoglycemia; describe how to treat low blood sugar and actions for lowering  high blood glucose level. Outcome: Progressing Towards Goal  Goal: *Prevention, detection and treatment of chronic complications  Description  Define the natural course of diabetes and describe the relationship of blood glucose levels to long term complications of diabetes.   Outcome: Progressing Towards Goal  Goal: *Developing strategies to address psychosocial issues  Description  Describe feelings about living with diabetes; identify support needed and support network  Outcome: Progressing Towards Goal  Goal: *Insulin pump training  Outcome: Progressing Towards Goal  Goal: *Sick day guidelines  Outcome: Progressing Towards Goal  Goal: *Patient Specific Goal (EDIT GOAL, INSERT TEXT)  Outcome: Progressing Towards Goal     Problem: Patient Education: Go to Patient Education Activity  Goal: Patient/Family Education  Outcome: Progressing Towards Goal

## 2019-04-26 NOTE — NURSE NAVIGATOR
Chart reviewed by Heart Failure Nurse Navigator. Heart Failure database completed. EF:  61/65%    ACEi/ARB/ARNi: lisinopril 40 mg daily    BB: toprol xl 100 mg daily    Aldosterone Antagonist: not indicated    Obstructive Sleep Apnea Screening:   STOP-BANG score:   Referred to Sleep Medicine:     CRT not currently indicated    NYHA Functional Class documentation requested. Heart Failure Teach Back in Patient Education. Heart Failure Avoiding Triggers on Discharge Instructions. Cardiologist: Dr. Rod Hebert ([facility])      [address] [place] [address] discharge follow up phone call to be made within 48-72 hours of discharge.

## 2019-04-26 NOTE — PROGRESS NOTES
Bedside and Verbal shift change report given to Madina Siegel RN (oncoming nurse) by Roseanne Castillo RN (offgoing nurse). Report included the following information SBAR, Kardex, ED Summary, Procedure Summary, Intake/Output, MAR, Accordion, Recent Results and Med Rec Status.

## 2019-04-26 NOTE — DIABETES MGMT
DTC Progress Note    Recommendations/ Comments: Chart review for blood sugars >300 mg/dL. Patient is on pre-mixed insulin at home and would normally require Lantus at 1800. Given high blood sugar levels, split dose Lantus. If appropriate please consider:   - addition of Lantus 20 units BID (include a now dose)  Noted Lantus ordered for 10 am    Current hospital DM medication:  lispro normal correction scale. Chart reviewed on Jacki Romero. Patient is a 60 y.o. female with known diabetes on 70/30 45 units BID at home. A1c:   Lab Results   Component Value Date/Time    Hemoglobin A1c 10.2 (H) 04/25/2019 06:25 AM    Hemoglobin A1c 9.7 (H) 07/02/2018 04:27 AM       Recent Glucose Results:   Lab Results   Component Value Date/Time     (H) 04/26/2019 01:48 AM    GLUCPOC 382 (H) 04/26/2019 08:45 AM    GLUCPOC 394 (H) 04/26/2019 07:09 AM    GLUCPOC 285 (H) 04/25/2019 08:51 PM        Lab Results   Component Value Date/Time    Creatinine 1.23 (H) 04/26/2019 01:48 AM     Estimated Creatinine Clearance: 55.1 mL/min (A) (based on SCr of 1.23 mg/dL (H)). Active Orders   Diet    DIET NPO Except Meds, With Ice Chips        PO intake:   No data found. Will continue to follow as needed.     Thank you  Donavon Bach MS, RN, CDE    Time Spent: 8 minutes

## 2019-04-26 NOTE — PROGRESS NOTES
Bedside and Verbal shift change report given to Elliott Diez (oncoming nurse) by Dona Alberts RN (offgoing nurse). Report included the following information SBAR, Kardex, Intake/Output, MAR, Recent Results and Cardiac Rhythm nsr.

## 2019-04-26 NOTE — PROGRESS NOTES
Spiritual Care Partner Volunteer visited patient in room 668/01 on 4.26.19. Documented by: : Rev. Ana Farley; [facility], to contact [phone] [address] call: [phone]

## 2019-04-26 NOTE — DIABETES MGMT
[facility]    Elevated A1C Visit Note    Recommendations/ Comments:  Blood sugar 402 mg/dL pre lunch. If appropriate, please consider:   - addition of Lispro pre meal insulin, 5 units tid ac    Current hospital DM medication:  lispro normal correction scale, Lantus 35 units    Patient is a 58 y.o. female with known Type 2 Diabetes on insulin injections: NPH/Reg 70/30: 65 units BID at home. She stated that her glucose meter was broken, so she was provided with a Contour Next One and instructed on use. She states that she had been drinking large amounts of regular soda up until about 2 weeks ago. Suggested outpatient classes, but she declined stating \"I'll call you. \"  Contact # reviewed. A1c:   Lab Results   Component Value Date/Time    Hemoglobin A1c 10.2 (H) 04/25/2019 06:25 AM    Hemoglobin A1c 9.7 (H) 07/02/2018 04:27 AM       Recent Glucose Results:   Lab Results   Component Value Date/Time     (H) 04/26/2019 01:48 AM    GLUCPOC 402 (H) 04/26/2019 11:18 AM    GLUCPOC 382 (H) 04/26/2019 08:45 AM    GLUCPOC 394 (H) 04/26/2019 07:09 AM        Lab Results   Component Value Date/Time    Creatinine 1.23 (H) 04/26/2019 01:48 AM       Active Orders   Diet    DIET DIABETIC CONSISTENT CARB Regular          Assessed and instructed patient on the following:   ·  interpretation of lab results, blood sugar goals, complications of diabetes mellitus, hypoglycemia prevention and treatment, insulin adjustments and nutrition    Provided patient with the following: [x]          Diabetes Self-Care Guide               [x]          Outpatient DTC contact number          Patient to follow up with PCP after discharge. Will continue to follow as needed. Thank you.    Apolinar Beck, MS, RN, CDE    Time spent: 25 minutes

## 2019-04-26 NOTE — PROGRESS NOTES
Cardiology Progress Note  2019     Admit Date: 2019  Admit Diagnosis: NSTEMI (non-ST elevated myocardial infarction) ([facility] 75.) [I21.4]  CC: none currently    Assessment:   Principal Problem:    NSTEMI (non-ST elevated myocardial infarction) ([facility] (2019)      Plan:     Stress test negative, echo with normal LVEF  Cont with diuretics  Daily BMP  Consider adding spironolactone if Cr normalizes    Subjective:      Jacki Romero feels improved      Objective:    Physical Exam:  Overall VSSAF;    Visit Vitals  /75 (BP 1 Location: Left arm, BP Patient Position: At rest)   Pulse 95   Temp 97.9 °F (36.6 °C)   Resp 15   Ht 5' 4\" (1.626 m)   Wt 107 kg (235 lb 14.3 oz)   SpO2 98%   Breastfeeding? No   BMI 40.49 kg/m²     Temp (24hrs), Av.2 °F (36.8 °C), Min:97.9 °F (36.6 °C), Max:98.4 °F (36.9 °C)    Patient Vitals for the past 8 hrs:   Pulse   19 1400 95   19 1342 95   19 1058 96    Patient Vitals for the past 8 hrs:   Resp   19 1400 15   19 1058 15    Patient Vitals for the past 8 hrs:   BP   19 1400 136/75   19 1342 136/75   19 1058 118/58       190 -  0700  In: 8679 [P.O.:1650]  Out: 7768 [Urine:4850]      General Appearance: Well developed, well nourished, no acute distress. Ears/Nose/Mouth/Throat:   Normal MM; anicteric. JVP: WNL   Resp:   Lungs clear to auscultation bilaterally. Nl resp effort. Cardiovascular:  RRR, S1, S2 normal, no new murmur. No gallop or rub. Abdomen:   Soft, non-tender, bowel sounds are present. Extremities: + edema bilaterally. Skin:  Neuro: Warm and dry.   A/O x3, grossly nonfocal                         Data Review:     Telemetry independently reviewed :   normal sinus rhythm         Labs:   Recent Results (from the past 24 hour(s))   ECHO ADULT COMPLETE    Collection Time: 19  4:39 PM   Result Value Ref Range    LV E' Lateral Velocity 4.46 cm/s    LV E' Septal Velocity 5.43 cm/s    Aortic Valve Systolic Peak Velocity 160.25 cm/s    Aortic Valve Area by Continuity of Peak Velocity 1.9 cm2    AoV PG 8.8 mmHg    LVIDd 4.67 3.9 - 5.3 cm    LVPWd 0.98 (A) 0.6 - 0.9 cm    LVIDs 2.86 cm    IVSd 0.94 (A) 0.6 - 0.9 cm    LVOT d 1.84 cm    LVOT Peak Velocity 104.29 cm/s    LVOT Peak Gradient 4.4 mmHg    MVA (PHT) 5.2 cm2    MV A Jeremiah 155.23 cm/s    MV E Jeremiah 124.13 cm/s    MV E/A 0.80     Right Ventricular Outflow Track Peak Velocity 96.79 cm/s    LV Mass .7 (A) 67 - 162 g    LV Mass AL Index 85.2 43 - 95 g/m2    E/E' lateral 27.83     E/E' septal 22.86     RVSP 17.9 mmHg    E/E' ratio (averaged) 25.35     Est. RA Pressure 5.0 mmHg    Mitral Valve E Wave Deceleration Time 144.6 ms    Mitral Valve Pressure Half-time 41.9 ms    Triscuspid Valve Regurgitation Peak Gradient 12.9 mmHg    TR Max Velocity 179.34 cm/s   GLUCOSE, POC    Collection Time: 04/25/19  5:03 PM   Result Value Ref Range    Glucose (POC) 299 (H) 65 - 100 mg/dL    Performed by Rashad Hogue    GLUCOSE, POC    Collection Time: 04/25/19  8:51 PM   Result Value Ref Range    Glucose (POC) 285 (H) 65 - 100 mg/dL    Performed by Kyaw Cloud    METABOLIC PANEL, COMPREHENSIVE    Collection Time: 04/26/19  1:48 AM   Result Value Ref Range    Sodium 137 136 - 145 mmol/L    Potassium 2.9 (L) 3.5 - 5.1 mmol/L    Chloride 103 97 - 108 mmol/L    CO2 28 21 - 32 mmol/L    Anion gap 6 5 - 15 mmol/L    Glucose 316 (H) 65 - 100 mg/dL    BUN 26 (H) 6 - 20 MG/DL    Creatinine 1.23 (H) 0.55 - 1.02 MG/DL    BUN/Creatinine ratio 21 (H) 12 - 20      GFR est AA 53 (L) >60 ml/min/1.73m2    GFR est non-AA 44 (L) >60 ml/min/1.73m2    Calcium 8.2 (L) 8.5 - 10.1 MG/DL    Bilirubin, total 0.4 0.2 - 1.0 MG/DL    ALT (SGPT) 16 12 - 78 U/L    AST (SGOT) 29 15 - 37 U/L    Alk.  phosphatase 107 45 - 117 U/L    Protein, total 6.3 (L) 6.4 - 8.2 g/dL    Albumin 2.3 (L) 3.5 - 5.0 g/dL    Globulin 4.0 2.0 - 4.0 g/dL    A-G Ratio 0.6 (L) 1.1 - 2.2     CBC WITH AUTOMATED DIFF Collection Time: 04/26/19  1:48 AM   Result Value Ref Range    WBC 8.1 3.6 - 11.0 K/uL    RBC 3.26 (L) 3.80 - 5.20 M/uL    HGB 10.7 (L) 11.5 - 16.0 g/dL    HCT 32.6 (L) 35.0 - 47.0 %    .0 (H) 80.0 - 99.0 FL    MCH 32.8 26.0 - 34.0 PG    MCHC 32.8 30.0 - 36.5 g/dL    RDW 14.2 11.5 - 14.5 %    PLATELET 809 202 - 710 K/uL    MPV 9.8 8.9 - 12.9 FL    NRBC 0.0 0  WBC    ABSOLUTE NRBC 0.00 0.00 - 0.01 K/uL    NEUTROPHILS 52 32 - 75 %    LYMPHOCYTES 32 12 - 49 %    MONOCYTES 7 5 - 13 %    EOSINOPHILS 8 (H) 0 - 7 %    BASOPHILS 1 0 - 1 %    IMMATURE GRANULOCYTES 0 0.0 - 0.5 %    ABS. NEUTROPHILS 4.2 1.8 - 8.0 K/UL    ABS. LYMPHOCYTES 2.6 0.8 - 3.5 K/UL    ABS. MONOCYTES 0.6 0.0 - 1.0 K/UL    ABS. EOSINOPHILS 0.7 (H) 0.0 - 0.4 K/UL    ABS. BASOPHILS 0.1 0.0 - 0.1 K/UL    ABS. IMM.  GRANS. 0.0 0.00 - 0.04 K/UL    DF AUTOMATED     GLUCOSE, POC    Collection Time: 04/26/19  7:09 AM   Result Value Ref Range    Glucose (POC) 394 (H) 65 - 100 mg/dL    Performed by Willian Bonilla [id number], POC    Collection Time: 04/26/19  8:45 AM   Result Value Ref Range    Glucose (POC) 382 (H) 65 - 100 mg/dL    Performed by Benson 91 STRESS TEST    Collection Time: 04/26/19 10:43 AM   Result Value Ref Range    Target  bpm    Exercise duration time 00:03:00     Stress Base Systolic  mmHg    Stress Base Diastolic BP 93 mmHg    Post peak  BPM    Baseline  BPM    Estimated workload 1.0 METS    Percent HR 85 %    Stress Rate Pressure Product 24,472 BPM*mmHg    ST Elevation (mm) 0 mm    ST Depression (mm) 0 mm   GLUCOSE, POC    Collection Time: 04/26/19 11:18 AM   Result Value Ref Range    Glucose (POC) 402 (H) 65 - 100 mg/dL    Performed by Mayte Berger       Current medications reviewed       Kwame Hwang MD

## 2019-04-27 LAB
ANION GAP SERPL CALC-SCNC: 6 MMOL/L (ref 5–15)
BASOPHILS # BLD: 0.1 K/UL (ref 0–0.1)
BASOPHILS NFR BLD: 1 % (ref 0–1)
BUN SERPL-MCNC: 24 MG/DL (ref 6–20)
BUN/CREAT SERPL: 18 (ref 12–20)
CALCIUM SERPL-MCNC: 8.2 MG/DL (ref 8.5–10.1)
CHLORIDE SERPL-SCNC: 103 MMOL/L (ref 97–108)
CO2 SERPL-SCNC: 29 MMOL/L (ref 21–32)
CREAT SERPL-MCNC: 1.3 MG/DL (ref 0.55–1.02)
DIFFERENTIAL METHOD BLD: ABNORMAL
EOSINOPHIL # BLD: 0.4 K/UL (ref 0–0.4)
EOSINOPHIL NFR BLD: 6 % (ref 0–7)
ERYTHROCYTE [DISTWIDTH] IN BLOOD BY AUTOMATED COUNT: 14.6 % (ref 11.5–14.5)
GLUCOSE BLD STRIP.AUTO-MCNC: 292 MG/DL (ref 65–100)
GLUCOSE BLD STRIP.AUTO-MCNC: 297 MG/DL (ref 65–100)
GLUCOSE BLD STRIP.AUTO-MCNC: 319 MG/DL (ref 65–100)
GLUCOSE BLD STRIP.AUTO-MCNC: 353 MG/DL (ref 65–100)
GLUCOSE SERPL-MCNC: 269 MG/DL (ref 65–100)
HCT VFR BLD AUTO: 35 % (ref 35–47)
HGB BLD-MCNC: 11 G/DL (ref 11.5–16)
IMM GRANULOCYTES # BLD AUTO: 0 K/UL (ref 0–0.04)
IMM GRANULOCYTES NFR BLD AUTO: 0 % (ref 0–0.5)
LYMPHOCYTES # BLD: 2.7 K/UL (ref 0.8–3.5)
LYMPHOCYTES NFR BLD: 40 % (ref 12–49)
MCH RBC QN AUTO: 32.8 PG (ref 26–34)
MCHC RBC AUTO-ENTMCNC: 31.4 G/DL (ref 30–36.5)
MCV RBC AUTO: 104.5 FL (ref 80–99)
MONOCYTES # BLD: 0.6 K/UL (ref 0–1)
MONOCYTES NFR BLD: 10 % (ref 5–13)
NEUTS SEG # BLD: 2.9 K/UL (ref 1.8–8)
NEUTS SEG NFR BLD: 43 % (ref 32–75)
NRBC # BLD: 0 K/UL (ref 0–0.01)
NRBC BLD-RTO: 0 PER 100 WBC
PLATELET # BLD AUTO: 261 K/UL (ref 150–400)
PMV BLD AUTO: 9.7 FL (ref 8.9–12.9)
POTASSIUM SERPL-SCNC: 3.3 MMOL/L (ref 3.5–5.1)
RBC # BLD AUTO: 3.35 M/UL (ref 3.8–5.2)
SERVICE CMNT-IMP: ABNORMAL
SODIUM SERPL-SCNC: 138 MMOL/L (ref 136–145)
WBC # BLD AUTO: 6.7 K/UL (ref 3.6–11)

## 2019-04-27 PROCEDURE — 85025 COMPLETE CBC W/AUTO DIFF WBC: CPT

## 2019-04-27 PROCEDURE — 74011250636 HC RX REV CODE- 250/636: Performed by: INTERNAL MEDICINE

## 2019-04-27 PROCEDURE — 74011636637 HC RX REV CODE- 636/637: Performed by: INTERNAL MEDICINE

## 2019-04-27 PROCEDURE — 80048 BASIC METABOLIC PNL TOTAL CA: CPT

## 2019-04-27 PROCEDURE — 94760 N-INVAS EAR/PLS OXIMETRY 1: CPT

## 2019-04-27 PROCEDURE — 94640 AIRWAY INHALATION TREATMENT: CPT

## 2019-04-27 PROCEDURE — 74011000250 HC RX REV CODE- 250: Performed by: INTERNAL MEDICINE

## 2019-04-27 PROCEDURE — 97161 PT EVAL LOW COMPLEX 20 MIN: CPT | Performed by: PHYSICAL THERAPIST

## 2019-04-27 PROCEDURE — 36415 COLL VENOUS BLD VENIPUNCTURE: CPT

## 2019-04-27 PROCEDURE — 82962 GLUCOSE BLOOD TEST: CPT

## 2019-04-27 PROCEDURE — 97116 GAIT TRAINING THERAPY: CPT | Performed by: PHYSICAL THERAPIST

## 2019-04-27 PROCEDURE — 74011250637 HC RX REV CODE- 250/637: Performed by: INTERNAL MEDICINE

## 2019-04-27 PROCEDURE — 77010033678 HC OXYGEN DAILY

## 2019-04-27 PROCEDURE — 65660000000 HC RM CCU STEPDOWN

## 2019-04-27 RX ORDER — FUROSEMIDE 10 MG/ML
40 INJECTION INTRAMUSCULAR; INTRAVENOUS EVERY 12 HOURS
Status: DISCONTINUED | OUTPATIENT
Start: 2019-04-27 | End: 2019-04-28

## 2019-04-27 RX ORDER — FUROSEMIDE 10 MG/ML
40 INJECTION INTRAMUSCULAR; INTRAVENOUS EVERY 8 HOURS
Status: DISCONTINUED | OUTPATIENT
Start: 2019-04-27 | End: 2019-04-27

## 2019-04-27 RX ORDER — INSULIN GLARGINE 100 [IU]/ML
45 INJECTION, SOLUTION SUBCUTANEOUS 2 TIMES DAILY
Status: DISCONTINUED | OUTPATIENT
Start: 2019-04-27 | End: 2019-04-28 | Stop reason: HOSPADM

## 2019-04-27 RX ORDER — POTASSIUM CHLORIDE 750 MG/1
40 TABLET, FILM COATED, EXTENDED RELEASE ORAL
Status: COMPLETED | OUTPATIENT
Start: 2019-04-27 | End: 2019-04-27

## 2019-04-27 RX ORDER — FUROSEMIDE 10 MG/ML
20 INJECTION INTRAMUSCULAR; INTRAVENOUS EVERY 12 HOURS
Status: DISCONTINUED | OUTPATIENT
Start: 2019-04-27 | End: 2019-04-27

## 2019-04-27 RX ADMIN — ARFORMOTEROL TARTRATE 15 MCG: 15 SOLUTION RESPIRATORY (INHALATION) at 21:35

## 2019-04-27 RX ADMIN — INSULIN LISPRO 5 UNITS: 100 INJECTION, SOLUTION INTRAVENOUS; SUBCUTANEOUS at 12:01

## 2019-04-27 RX ADMIN — ROSUVASTATIN CALCIUM 20 MG: 10 TABLET, COATED ORAL at 21:41

## 2019-04-27 RX ADMIN — ASPIRIN 325 MG: 325 TABLET ORAL at 08:10

## 2019-04-27 RX ADMIN — INSULIN GLARGINE 45 UNITS: 100 INJECTION, SOLUTION SUBCUTANEOUS at 17:08

## 2019-04-27 RX ADMIN — FUROSEMIDE 60 MG: 10 INJECTION, SOLUTION INTRAMUSCULAR; INTRAVENOUS at 05:26

## 2019-04-27 RX ADMIN — Medication 10 ML: at 21:41

## 2019-04-27 RX ADMIN — INSULIN LISPRO 5 UNITS: 100 INJECTION, SOLUTION INTRAVENOUS; SUBCUTANEOUS at 17:08

## 2019-04-27 RX ADMIN — NIFEDIPINE 60 MG: 60 TABLET, FILM COATED, EXTENDED RELEASE ORAL at 08:10

## 2019-04-27 RX ADMIN — FUROSEMIDE 40 MG: 10 INJECTION, SOLUTION INTRAMUSCULAR; INTRAVENOUS at 21:41

## 2019-04-27 RX ADMIN — ACETAMINOPHEN 650 MG: 325 TABLET ORAL at 21:43

## 2019-04-27 RX ADMIN — ARFORMOTEROL TARTRATE 15 MCG: 15 SOLUTION RESPIRATORY (INHALATION) at 09:06

## 2019-04-27 RX ADMIN — INSULIN GLARGINE 45 UNITS: 100 INJECTION, SOLUTION SUBCUTANEOUS at 09:54

## 2019-04-27 RX ADMIN — INSULIN LISPRO 3 UNITS: 100 INJECTION, SOLUTION INTRAVENOUS; SUBCUTANEOUS at 21:53

## 2019-04-27 RX ADMIN — MONTELUKAST SODIUM 10 MG: 10 TABLET, FILM COATED ORAL at 21:41

## 2019-04-27 RX ADMIN — POTASSIUM CHLORIDE 40 MEQ: 750 TABLET, EXTENDED RELEASE ORAL at 09:54

## 2019-04-27 RX ADMIN — INSULIN LISPRO 7 UNITS: 100 INJECTION, SOLUTION INTRAVENOUS; SUBCUTANEOUS at 08:10

## 2019-04-27 RX ADMIN — FUROSEMIDE 20 MG: 10 INJECTION, SOLUTION INTRAMUSCULAR; INTRAVENOUS at 10:07

## 2019-04-27 RX ADMIN — Medication 10 ML: at 05:27

## 2019-04-27 RX ADMIN — BUDESONIDE 500 MCG: 0.5 INHALANT RESPIRATORY (INHALATION) at 21:36

## 2019-04-27 RX ADMIN — INSULIN LISPRO 7 UNITS: 100 INJECTION, SOLUTION INTRAVENOUS; SUBCUTANEOUS at 12:01

## 2019-04-27 RX ADMIN — INSULIN LISPRO 5 UNITS: 100 INJECTION, SOLUTION INTRAVENOUS; SUBCUTANEOUS at 08:11

## 2019-04-27 RX ADMIN — Medication 10 ML: at 12:02

## 2019-04-27 RX ADMIN — BUDESONIDE 500 MCG: 0.5 INHALANT RESPIRATORY (INHALATION) at 09:06

## 2019-04-27 RX ADMIN — METOPROLOL SUCCINATE 100 MG: 50 TABLET, EXTENDED RELEASE ORAL at 08:10

## 2019-04-27 NOTE — PROGRESS NOTES
Cardiology Progress Note                                        Admit Date: 4/24/2019    Assessment/Plan:     Volume overload and acute diastolic HF; improving; I feel we can back off on diuretic  CKD; stable  CAD; negative stress test    Jourdan Abdullahi is a 58 y.o. female with     PROBLEM LIST:  Patient Active Problem List    Diagnosis Date Noted    Chest pain at rest 02/21/2014     Priority: 1 - One    ASHD (arteriosclerotic heart disease) 02/21/2014     Priority: 1 - One    Diabetes mellitus ([facility] 02/21/2014     Priority: 2 - Two    Hypertension, benign 02/21/2014     Priority: 2 - Two    ASO (arteriosclerosis obliterans) 02/21/2014     Priority: 2 - Two    Hypercholesteremia 02/21/2014     Priority: 3 - Three    NSTEMI (non-ST elevated myocardial infarction) ([facility] 04/25/2019    Bilateral leg edema 03/15/2018         Subjective:     Jacki Romero reports none. Visit Vitals  /60   Pulse (!) 106   Temp 98.6 °F (37 °C)   Resp 18   Ht 5' 4\" (1.626 m)   Wt 226 lb 14.4 oz (102.9 kg)   SpO2 98%   Breastfeeding? No   BMI 38.95 kg/m²       Intake/Output Summary (Last 24 hours) at 4/27/2019 0940  Last data filed at 4/27/2019 0600  Gross per 24 hour   Intake 1600 ml   Output 3500 ml   Net -1900 ml       Objective:      Physical Exam:  HEENT: Perrla, EOMI  Neck: No JVD,  No thyroidmegaly  Resp: CTA bilaterally;  No wheezes or rales  CV: RRR s1s2 No murmur no s3  Abd:Soft, Nontender  Ext: No edema  Neuro: Alert and oriented; Nonfocal  Skin: Warm, Dry, Intact  Pulses: 2+ DP/PT/Rad      Telemetry: normal sinus rhythm    Current Facility-Administered Medications   Medication Dose Route Frequency    furosemide (LASIX) injection 40 mg  40 mg IntraVENous Q8H    potassium chloride SR (KLOR-CON 10) tablet 40 mEq  40 mEq Oral NOW    insulin glargine (LANTUS) injection 45 Units  45 Units SubCUTAneous BID    insulin lispro (HUMALOG) injection 5 Units  5 Units SubCUTAneous TID WITH MEALS    acetaminophen (TYLENOL) tablet 650 mg  650 mg Oral Q4H PRN    albuterol-ipratropium (DUO-NEB) 2.5 MG-0.5 MG/3 ML  3 mL Nebulization Q4H PRN    aspirin tablet 325 mg  325 mg Oral DAILY    metoprolol succinate (TOPROL-XL) XL tablet 100 mg  100 mg Oral DAILY    montelukast (SINGULAIR) tablet 10 mg  10 mg Oral QHS    NIFEdipine ER (PROCARDIA XL) tablet 60 mg  60 mg Oral DAILY    rosuvastatin (CRESTOR) tablet 20 mg  20 mg Oral QHS    sodium chloride (NS) flush 5-40 mL  5-40 mL IntraVENous Q8H    sodium chloride (NS) flush 5-40 mL  5-40 mL IntraVENous PRN    ondansetron (ZOFRAN) injection 4 mg  4 mg IntraVENous Q4H PRN    bisacodyl (DULCOLAX) tablet 5 mg  5 mg Oral DAILY PRN    insulin lispro (HUMALOG) injection   SubCUTAneous AC&HS    glucose chewable tablet 16 g  4 Tab Oral PRN    dextrose (D50W) injection syrg 12.5-25 g  12.5-25 g IntraVENous PRN    glucagon (GLUCAGEN) injection 1 mg  1 mg IntraMUSCular PRN    arformoterol (BROVANA) neb solution 15 mcg  15 mcg Nebulization BID RT    And    budesonide (PULMICORT) 500 mcg/2 ml nebulizer suspension  500 mcg Nebulization BID RT    nicotine (NICODERM CQ) 21 mg/24 hr patch 1 Patch  1 Patch TransDERmal DAILY         Data Review:   Labs:    Recent Results (from the past 24 hour(s))   NUCLEAR CARDIAC STRESS TEST    Collection Time: 04/26/19 10:43 AM   Result Value Ref Range    Target  bpm    Exercise duration time 00:03:00     Stress Base Systolic  mmHg    Stress Base Diastolic BP 93 mmHg    Post peak  BPM    Baseline  BPM    Estimated workload 1.0 METS    Percent HR 85 %    Stress Rate Pressure Product 24,472 BPM*mmHg    ST Elevation (mm) 0 mm    ST Depression (mm) 0 mm   GLUCOSE, POC    Collection Time: 04/26/19 11:18 AM   Result Value Ref Range    Glucose (POC) 402 (H) 65 - 100 mg/dL    Performed by Chata Jung    GLUCOSE, POC    Collection Time: 04/26/19  4:28 PM   Result Value Ref Range    Glucose (POC) 383 (H) 65 - 100 mg/dL    Performed by Lenard Kimbrough Gerald    GLUCOSE, POC    Collection Time: 04/26/19  9:19 PM   Result Value Ref Range    Glucose (POC) 298 (H) 65 - 100 mg/dL    Performed by Unkown     CBC WITH AUTOMATED DIFF    Collection Time: 04/27/19  1:50 AM   Result Value Ref Range    WBC 6.7 3.6 - 11.0 K/uL    RBC 3.35 (L) 3.80 - 5.20 M/uL    HGB 11.0 (L) 11.5 - 16.0 g/dL    HCT 35.0 35.0 - 47.0 %    .5 (H) 80.0 - 99.0 FL    MCH 32.8 26.0 - 34.0 PG    MCHC 31.4 30.0 - 36.5 g/dL    RDW 14.6 (H) 11.5 - 14.5 %    PLATELET 137 924 - 809 K/uL    MPV 9.7 8.9 - 12.9 FL    NRBC 0.0 0  WBC    ABSOLUTE NRBC 0.00 0.00 - 0.01 K/uL    NEUTROPHILS 43 32 - 75 %    LYMPHOCYTES 40 12 - 49 %    MONOCYTES 10 5 - 13 %    EOSINOPHILS 6 0 - 7 %    BASOPHILS 1 0 - 1 %    IMMATURE GRANULOCYTES 0 0.0 - 0.5 %    ABS. NEUTROPHILS 2.9 1.8 - 8.0 K/UL    ABS. LYMPHOCYTES 2.7 0.8 - 3.5 K/UL    ABS. MONOCYTES 0.6 0.0 - 1.0 K/UL    ABS. EOSINOPHILS 0.4 0.0 - 0.4 K/UL    ABS. BASOPHILS 0.1 0.0 - 0.1 K/UL    ABS. IMM.  GRANS. 0.0 0.00 - 0.04 K/UL    DF AUTOMATED     METABOLIC PANEL, BASIC    Collection Time: 04/27/19  1:50 AM   Result Value Ref Range    Sodium 138 136 - 145 mmol/L    Potassium 3.3 (L) 3.5 - 5.1 mmol/L    Chloride 103 97 - 108 mmol/L    CO2 29 21 - 32 mmol/L    Anion gap 6 5 - 15 mmol/L    Glucose 269 (H) 65 - 100 mg/dL    BUN 24 (H) 6 - 20 MG/DL    Creatinine 1.30 (H) 0.55 - 1.02 MG/DL    BUN/Creatinine ratio 18 12 - 20      GFR est AA 50 (L) >60 ml/min/1.73m2    GFR est non-AA 41 (L) >60 ml/min/1.73m2    Calcium 8.2 (L) 8.5 - 10.1 MG/DL   GLUCOSE, POC    Collection Time: 04/27/19  7:31 AM   Result Value Ref Range    Glucose (POC) 319 (H) 65 - 100 mg/dL    Performed by Savannah Bermudez

## 2019-04-27 NOTE — PROGRESS NOTES
Problem: Pressure Injury - Risk of  Goal: *Prevention of pressure injury  Description  Document Waldo Scale and appropriate interventions in the flowsheet. Outcome: Progressing Towards Goal     Problem: Patient Education: Go to Patient Education Activity  Goal: Patient/Family Education  Outcome: Progressing Towards Goal     Problem: Pain  Goal: *Control of Pain  Outcome: Progressing Towards Goal     Problem: Patient Education: Go to Patient Education Activity  Goal: Patient/Family Education  Outcome: Progressing Towards Goal     Problem: Falls - Risk of  Goal: *Absence of Falls  Description  Document Brant Lozada Fall Risk and appropriate interventions in the flowsheet.   Outcome: Progressing Towards Goal     Problem: Patient Education: Go to Patient Education Activity  Goal: Patient/Family Education  Outcome: Progressing Towards Goal     Problem: Patient Education: Go to Patient Education Activity  Goal: Patient/Family Education  Outcome: Progressing Towards Goal     Problem: Heart Failure: Day 1  Goal: Off Pathway (Use only if patient is Off Pathway)  Outcome: Progressing Towards Goal  Goal: Activity/Safety  Outcome: Progressing Towards Goal  Goal: Consults, if ordered  Outcome: Progressing Towards Goal  Goal: Diagnostic Test/Procedures  Outcome: Progressing Towards Goal  Goal: Nutrition/Diet  Outcome: Progressing Towards Goal  Goal: Discharge Planning  Outcome: Progressing Towards Goal  Goal: Medications  Outcome: Progressing Towards Goal  Goal: Respiratory  Outcome: Progressing Towards Goal  Goal: Treatments/Interventions/Procedures  Outcome: Progressing Towards Goal  Goal: Psychosocial  Outcome: Progressing Towards Goal  Goal: *Oxygen saturation within defined limits  Outcome: Progressing Towards Goal  Goal: *Hemodynamically stable  Outcome: Progressing Towards Goal  Goal: *Optimal pain control at patient's stated goal  Outcome: Progressing Towards Goal  Goal: *Anxiety reduced or absent  Outcome: Progressing Towards Goal     Problem: Heart Failure: Discharge Outcomes  Goal: *Demonstrates ability to perform prescribed activity without shortness of breath or discomfort  Outcome: Progressing Towards Goal  Goal: *Left ventricular function assessment completed prior to or during stay, or planned for post-discharge  Outcome: Progressing Towards Goal  Goal: *ACEI prescribed if LVEF less than 40% and no contraindications or ARB prescribed  Outcome: Progressing Towards Goal  Goal: *Verbalizes understanding and describes prescribed diet  Outcome: Progressing Towards Goal  Goal: *Describes smoking cessation resources  Outcome: Progressing Towards Goal  Goal: *Understands and describes signs and symptoms to report to providers(Stroke Metric)  Outcome: Progressing Towards Goal     Problem: Heart Failure: Day 2  Goal: Off Pathway (Use only if patient is Off Pathway)  Outcome: Progressing Towards Goal  Goal: Activity/Safety  Outcome: Progressing Towards Goal  Goal: Consults, if ordered  Outcome: Progressing Towards Goal  Goal: Diagnostic Test/Procedures  Outcome: Progressing Towards Goal  Goal: Nutrition/Diet  Outcome: Progressing Towards Goal  Goal: Discharge Planning  Outcome: Progressing Towards Goal  Goal: Medications  Outcome: Progressing Towards Goal  Goal: Respiratory  Outcome: Progressing Towards Goal  Goal: Treatments/Interventions/Procedures  Outcome: Progressing Towards Goal  Goal: Psychosocial  Outcome: Progressing Towards Goal  Goal: *Oxygen saturation within defined limits  Outcome: Progressing Towards Goal  Goal: *Hemodynamically stable  Outcome: Progressing Towards Goal  Goal: *Optimal pain control at patient's stated goal  Outcome: Progressing Towards Goal  Goal: *Anxiety reduced or absent  Outcome: Progressing Towards Goal  Goal: *Demonstrates progressive activity  Outcome: Progressing Towards Goal     Problem: Heart Failure: Day 3  Goal: Off Pathway (Use only if patient is Off Pathway)  Outcome: Progressing Towards Goal  Goal: Activity/Safety  Outcome: Progressing Towards Goal  Goal: Diagnostic Test/Procedures  Outcome: Progressing Towards Goal  Goal: Nutrition/Diet  Outcome: Progressing Towards Goal  Goal: Discharge Planning  Outcome: Progressing Towards Goal  Goal: Medications  Outcome: Progressing Towards Goal  Goal: Respiratory  Outcome: Progressing Towards Goal  Goal: Treatments/Interventions/Procedures  Outcome: Progressing Towards Goal  Goal: Psychosocial  Outcome: Progressing Towards Goal  Goal: *Oxygen saturation within defined limits  Outcome: Progressing Towards Goal  Goal: *Hemodynamically stable  Outcome: Progressing Towards Goal  Goal: *Optimal pain control at patient's stated goal  Outcome: Progressing Towards Goal  Goal: *Anxiety reduced or absent  Outcome: Progressing Towards Goal  Goal: *Demonstrates progressive activity  Outcome: Progressing Towards Goal     Problem: Heart Failure: Day 4  Goal: Off Pathway (Use only if patient is Off Pathway)  Outcome: Progressing Towards Goal  Goal: Activity/Safety  Outcome: Progressing Towards Goal  Goal: Diagnostic Test/Procedures  Outcome: Progressing Towards Goal  Goal: Nutrition/Diet  Outcome: Progressing Towards Goal  Goal: Discharge Planning  Outcome: Progressing Towards Goal  Goal: Medications  Outcome: Progressing Towards Goal  Goal: Respiratory  Outcome: Progressing Towards Goal  Goal: Treatments/Interventions/Procedures  Outcome: Progressing Towards Goal  Goal: Psychosocial  Outcome: Progressing Towards Goal  Goal: *Oxygen saturation within defined limits  Outcome: Progressing Towards Goal  Goal: *Hemodynamically stable  Outcome: Progressing Towards Goal  Goal: *Optimal pain control at patient's stated goal  Outcome: Progressing Towards Goal  Goal: *Anxiety reduced or absent  Outcome: Progressing Towards Goal  Goal: *Demonstrates progressive activity  Outcome: Progressing Towards Goal     Problem: Heart Failure: Day 5  Goal: Off Pathway (Use only if patient is Off Pathway)  Outcome: Progressing Towards Goal  Goal: Activity/Safety  Outcome: Progressing Towards Goal  Goal: Diagnostic Test/Procedures  Outcome: Progressing Towards Goal  Goal: Nutrition/Diet  Outcome: Progressing Towards Goal  Goal: Discharge Planning  Outcome: Progressing Towards Goal  Goal: Medications  Outcome: Progressing Towards Goal  Goal: Respiratory  Outcome: Progressing Towards Goal  Goal: Treatments/Interventions/Procedures  Outcome: Progressing Towards Goal  Goal: Psychosocial  Outcome: Progressing Towards Goal     Problem: Heart Failure: Discharge Outcomes  Goal: *Demonstrates ability to perform prescribed activity without shortness of breath or discomfort  Outcome: Progressing Towards Goal  Goal: *Left ventricular function assessment completed prior to or during stay, or planned for post-discharge  Outcome: Progressing Towards Goal  Goal: *ACEI prescribed if LVEF less than 40% and no contraindications or ARB prescribed  Outcome: Progressing Towards Goal  Goal: *Verbalizes understanding and describes prescribed diet  Outcome: Progressing Towards Goal  Goal: *Verbalizes understanding/describes prescribed medications  Outcome: Progressing Towards Goal  Goal: *Describes available resources and support systems  Description  (eg: Home Health, Palliative Care, Advanced Medical Directive)  Outcome: Progressing Towards Goal  Goal: *Describes smoking cessation resources  Outcome: Progressing Towards Goal  Goal: *Understands and describes signs and symptoms to report to providers(Stroke Metric)  Outcome: Progressing Towards Goal  Goal: *Describes/verbalizes understanding of follow-up/return appt  Description  (eg: to physicians, diabetes treatment coordinator, and other resources  Outcome: Progressing Towards Goal  Goal: *Describes importance of continuing daily weights and changes to report to physician  Outcome: Progressing Towards Goal

## 2019-04-27 NOTE — PROGRESS NOTES
Problem: Mobility Impaired (Adult and Pediatric)  Goal: *Acute Goals and Plan of Care (Insert Text)  Description  Physical Therapy Goals  Initiated 4/27/2019  1. Patient will move from supine to sit and sit to supine  in bed with modified independence within 7 day(s). 2.  Patient will transfer from bed to chair and chair to bed with modified independence using the least restrictive device within 7 day(s). 3.  Patient will perform sit to stand with modified independence within 7 day(s). 4.  Patient will ambulate with modified independence for 150 feet with the least restrictive device within 7 day(s). 5.  Patient will ascend/descend 4 stairs with 1 handrail(s) with modified independence within 7 day(s). Outcome: Progressing Towards Goal   PHYSICAL THERAPY EVALUATION  Patient: Cammie Barragan (21 y.o. female)  Date: 4/27/2019  Primary Diagnosis: NSTEMI (non-ST elevated myocardial infarction) ([facility] [address] [I21.4]        Precautions: fall       ASSESSMENT :   Based on the objective data described below, patient presents with Contact guard assistance overall for functional mobility. Gait training completed at Contact guard assistance, 65 feet and using a gait belt. Generally limited by poor activity tolerance and fatigues quickly. The following are barriers to independence while in acute care:   -Cognitive and/or behavioral: none   -Medical condition: strength, functional endurance, standing balance and cardiopulmonary tolerance    -Other:       The patient will benefit from skilled acute intervention to address the above impairments and their rehabilitation potential is considered to be Good    Discharge recommendations: Home health (to increase independence and safety)  If above is not an option then recommend: None    Patient's barriers to discharging home, in addition to above impairments: none.      Equipment recommendations for successful discharge (if) home: none-but would encourage use of RW until strength returns to normal        PLAN :  Recommendations and Planned Interventions: bed mobility training, transfer training, gait training, therapeutic exercises, neuromuscular re-education, patient and family training/education and therapeutic activities      Frequency/Duration: Patient will be followed by physical therapy  5 times a week to address goals. SUBJECTIVE:   Patient stated ? I have been walking to and from the bathroom but that's about it. ?    OBJECTIVE DATA SUMMARY:   HISTORY:    Past Medical History:   Diagnosis Date    Asthma     CAD (coronary artery disease)     CHF (congestive heart failure) ([facility])     COPD (chronic obstructive pulmonary disease) ([facility])     Diabetes ([facility] [address]     Hypertension      Past Surgical History:   Procedure Laterality Date    CARDIAC SURG PROCEDURE UNLIST      DELIVERY       HX CORONARY STENT PLACEMENT      HX HYSTERECTOMY      VASCULAR SURGERY PROCEDURE UNLIST      leg stent     Prior Level of Function/Home Situation: Independent with mobility at baseline. Has been limited by SOB and uses riding cart if goes to the store but does drives  Personal factors and/or comorbidities impacting plan of care:     Home Situation  Home Environment: Private residence  # Steps to Enter: 3  Rails to Enter: Yes  One/Two Story Residence: One story  Living Alone: No  Support Systems: Friends \ neighbors, Family member(s)  Patient Expects to be Discharged to[de-identified] Private residence  Current DME Used/Available at Home: Nebulizer, Shower chair, Walker, rolling  Tub or Shower Type: Shower    EXAMINATION/PRESENTATION/DECISION MAKING:   Critical Behavior:  Neurologic State: Alert, Eyes open spontaneously  Orientation Level: Oriented X4  Cognition: Follows commands     Hearing:   Auditory  Auditory Impairment: None    Range Of Motion:  AROM: Generally decreased, functional                       Strength:    Strength: Generally decreased, functional                    Tone & Sensation: Coordination:     Vision:      Functional Mobility:  Bed Mobility:  Rolling: Supervision  Supine to Sit: Supervision  Sit to Supine: Supervision  Scooting: Supervision  Transfers:  Sit to Stand: Contact guard assistance  Stand to Sit: Contact guard assistance                       Balance:   Sitting: Intact  Standing: Intact; With support  Ambulation/Gait Training:  Distance (ft): 65 Feet (ft)  Assistive Device: Gait belt  Ambulation - Level of Assistance: Contact guard assistance     Gait Description (WDL): Exceptions to WDL  Gait Abnormalities: Antalgic;Decreased step clearance        Base of Support: Widened     Speed/Maryann: Pace decreased (<100 feet/min); Slow  Step Length: Left shortened;Right shortened         Functional Measure:  Barthel Index:    Bathin  Bladder: 10  Bowels: 10  Groomin  Dressin  Feeding: 10  Mobility: 10  Stairs: 5  Toilet Use: 10  Transfer (Bed to Chair and Back): 10  Total: 75/100       Percentage of impairment   0%   1-19%   20-39%   40-59%   60-79%   80-99%   100%   Barthel Score 0-100 100 99-80 79-60 59-40 20-39 1-19   0     The Barthel ADL Index: Guidelines  1. The index should be used as a record of what a patient does, not as a record of what a patient could do. 2. The main aim is to establish degree of independence from any help, physical or verbal, however minor and for whatever reason. 3. The need for supervision renders the patient not independent. 4. A patient's performance should be established using the best available evidence. Asking the patient, friends/relatives and nurses are the usual sources, but direct observation and common sense are also important. However direct testing is not needed. 5. Usually the patient's performance over the preceding 24-48 hours is important, but occasionally longer periods will be relevant. 6. Middle categories imply that the patient supplies over 50 per cent of the effort.   7. Use of aids to be independent is allowed. Roxi Dewitt., Barthel, D.W. (1453). Functional evaluation: the Barthel Index. [address] [place] [employer] (14)2. MOLLY Gonzalez, Katia Saldivar., Abdi Monroy., Mary, [address] (1999). Measuring the change indisability after inpatient rehabilitation; comparison of the responsiveness of the Barthel Index and Functional Golden Measure. Journal of Neurology, Neurosurgery, and Psychiatry, 66(4), 382-351. SARAI Arora, BAIRON Oviedo, & Jaleel Faye M.A. (2004.) Assessment of post-stroke quality of life in cost-effectiveness studies: The usefulness of the Barthel Index and the EuroQoL-5D. Quality of Life Research, 13, 427-43         Pain:  No c/o pain    Activity Tolerance:   Fair, requires frequent rest breaks and observed SOB with actity  Please refer to the flowsheet for vital signs taken during this treatment. After treatment patient left:   Up in chair  Caregiver at bedside  Call light within reach  RN notified     COMMUNICATION/EDUCATION:   The patient?s plan of care was discussed with: Physical Therapist, Occupational Therapist and Registered Nurse. Fall prevention education was provided and the patient/caregiver indicated understanding., Patient/family have participated as able in goal setting and plan of care. and Patient/family agree to work toward stated goals and plan of care.     Thank you for this referral.  Magdiel Shin, PT, DPT   Time Calculation: 15 mins

## 2019-04-27 NOTE — ROUTINE PROCESS
Bedside shift change report given to [address] [place] [address] (oncoming nurse) by Murphy Moody (offgoing nurse). Report included the following information SBAR, Kardex, ED Summary, Procedure Summary, Intake/Output, MAR, Accordion, Recent Results and Cardiac Rhythm NSR/ST.

## 2019-04-27 NOTE — PROGRESS NOTES
Problem: Falls - Risk of  Goal: *Absence of Falls  Description  Document Fernanda Bishop Fall Risk and appropriate interventions in the flowsheet.   Outcome: Progressing Towards Goal     Problem: Patient Education: Go to Patient Education Activity  Goal: Patient/Family Education  Outcome: Progressing Towards Goal     Problem: Heart Failure: Day 2  Goal: Activity/Safety  Outcome: Progressing Towards Goal  Goal: Consults, if ordered  Outcome: Progressing Towards Goal  Goal: Diagnostic Test/Procedures  Outcome: Progressing Towards Goal  Goal: Nutrition/Diet  Outcome: Progressing Towards Goal  Goal: Discharge Planning  Outcome: Progressing Towards Goal  Goal: Medications  Outcome: Progressing Towards Goal  Goal: Respiratory  Outcome: Progressing Towards Goal  Goal: Treatments/Interventions/Procedures  Outcome: Progressing Towards Goal  Goal: Psychosocial  Outcome: Progressing Towards Goal  Goal: *Oxygen saturation within defined limits  Outcome: Progressing Towards Goal  Goal: *Hemodynamically stable  Outcome: Progressing Towards Goal  Goal: *Optimal pain control at patient's stated goal  Outcome: Progressing Towards Goal  Goal: *Anxiety reduced or absent  Outcome: Progressing Towards Goal  Goal: *Demonstrates progressive activity  Outcome: Progressing Towards Goal     Problem: Heart Failure: Discharge Outcomes  Goal: *Demonstrates ability to perform prescribed activity without shortness of breath or discomfort  Outcome: Progressing Towards Goal  Goal: *Left ventricular function assessment completed prior to or during stay, or planned for post-discharge  Outcome: Progressing Towards Goal  Goal: *ACEI prescribed if LVEF less than 40% and no contraindications or ARB prescribed  Outcome: Progressing Towards Goal  Goal: *Verbalizes understanding and describes prescribed diet  Outcome: Progressing Towards Goal  Goal: *Verbalizes understanding/describes prescribed medications  Outcome: Progressing Towards Goal  Goal: *Describes available resources and support systems  Description  (eg: Home Health, Palliative Care, Advanced Medical Directive)  Outcome: Progressing Towards Goal  Goal: *Describes smoking cessation resources  Outcome: Progressing Towards Goal  Goal: *Understands and describes signs and symptoms to report to providers(Stroke Metric)  Outcome: Progressing Towards Goal  Goal: *Describes/verbalizes understanding of follow-up/return appt  Description  (eg: to physicians, diabetes treatment coordinator, and other resources  Outcome: Progressing Towards Goal  Goal: *Describes importance of continuing daily weights and changes to report to physician  Outcome: Progressing Towards Goal

## 2019-04-27 NOTE — PROGRESS NOTES
Bedside and Verbal shift change report given to Michel Shultz RN (oncoming nurse) by Betty Hughes RN (offgoing nurse). Report included the following information SBAR, Kardex, ED Summary, Procedure Summary, Intake/Output, MAR, Accordion, Recent Results and Med Rec Status.

## 2019-04-27 NOTE — PROGRESS NOTES
Hospitalist Progress Note      Hospital summary: This is a 60-year-old woman with a past medical history significant for coronary artery disease status post stent placement, chronic diastolic congestive heart failure, dyslipidemia, hypertension, type 2 diabetes, COPD, obstructive sleep apnea, pulmonary nodules, was in her usual state of health until a couple of weeks ago when the patient started experiencing worsening of the bilateral leg swelling 4/24/2019      Assessment/Plan:  Acute on chronic diastolic heart failure  - presented with bilateral leg swelling abd abdominal swelling  - bnp 380  - CXR: no acute changes  - venous duplex: neg DVT  - echo: EF 21-05%, grade 1 diastolic dysfunction  - c/w IV lasix 40mg q12h     Ruled out NSTEMI  - no chest pain now  - no significant EKG changes  - mild elevation of troponin  - cardiology on board  - dced heparin drip  - stress test : Negative      BRENNAN - improving  - cr 1.75 on POA, baseline 1  - avoid nephrotoxins  - will monitor renal function    Hypokalemia - replaced    Diabetes with hyperglycemia  - BG elevated  - patient takes Novolin 70/30 at home  - increased lantus 45u bid, lispro 5U with meals, ISS  - DM education    COPD-  C/w nebs  HTN - c/w nifedipine, metoprolol  HLD - statin  LAKIA-  CPAP at night  Tobacco abuse - nicotine patch. Counseled to quit    Code status: Full  DVT prophylaxis: SCDs  Disposition: TBD. Possible 1-2 days  ----------------------------------------------    CC: leg swelling    S: Patient is seen and examined at bedside. Sister present. Leg swelling improving. Denied chest pain. Review of Systems:  A comprehensive review of systems was negative. O:  Visit Vitals  /54 (BP 1 Location: Right arm, BP Patient Position: At rest)   Pulse 97   Temp 98 °F (36.7 °C)   Resp 25   Ht 5' 4\" (1.626 m)   Wt 102.9 kg (226 lb 14.4 oz)   SpO2 95%   Breastfeeding?  No   BMI 38.95 kg/m²       PHYSICAL EXAM:  Gen: NAD  HEENT: anicteric sclerae, normal conjunctiva, oropharynx clear, MM moist  Neck: supple, trachea midline, no adenopathy  Heart: RRR, S1 s2 heard, 1 + edema  Lungs: decreased breath sounds at bases  Abd: soft, NT, ND, BS+, no organomegaly  Extr: warm  Skin: dry, no rash  Neuro: CN II-XII grossly intact, normal speech, moves all extremities  Psych: normal mood, appropriate affect      Intake/Output Summary (Last 24 hours) at 4/27/2019 1046  Last data filed at 4/27/2019 0800  Gross per 24 hour   Intake 1600 ml   Output 4300 ml   Net -2700 ml        Recent labs & imaging reviewed:  Recent Results (from the past 24 hour(s))   GLUCOSE, POC    Collection Time: 04/26/19 11:18 AM   Result Value Ref Range    Glucose (POC) 402 (H) 65 - 100 mg/dL    Performed by [employer]    GLUCOSE, POC    Collection Time: 04/26/19  4:28 PM   Result Value Ref Range    Glucose (POC) 383 (H) 65 - 100 mg/dL    Performed by [employer]    GLUCOSE, POC    Collection Time: 04/26/19  9:19 PM   Result Value Ref Range    Glucose (POC) 298 (H) 65 - 100 mg/dL    Performed by [employer]     CBC WITH AUTOMATED DIFF    Collection Time: 04/27/19  1:50 AM   Result Value Ref Range    WBC 6.7 3.6 - 11.0 K/uL    RBC 3.35 (L) 3.80 - 5.20 M/uL    HGB 11.0 (L) 11.5 - 16.0 g/dL    HCT 35.0 35.0 - 47.0 %    .5 (H) 80.0 - 99.0 FL    MCH 32.8 26.0 - 34.0 PG    MCHC 31.4 30.0 - 36.5 g/dL    RDW 14.6 (H) 11.5 - 14.5 %    PLATELET 712 168 - 408 K/uL    MPV 9.7 8.9 - 12.9 FL    NRBC 0.0 0  WBC    ABSOLUTE NRBC 0.00 0.00 - 0.01 K/uL    NEUTROPHILS 43 32 - 75 %    LYMPHOCYTES 40 12 - 49 %    MONOCYTES 10 5 - 13 %    EOSINOPHILS 6 0 - 7 %    BASOPHILS 1 0 - 1 %    IMMATURE GRANULOCYTES 0 0.0 - 0.5 %    ABS. NEUTROPHILS 2.9 1.8 - 8.0 K/UL    ABS. LYMPHOCYTES 2.7 0.8 - 3.5 K/UL    ABS. MONOCYTES 0.6 0.0 - 1.0 K/UL    ABS. EOSINOPHILS 0.4 0.0 - 0.4 K/UL    ABS. BASOPHILS 0.1 0.0 - 0.1 K/UL    ABS. IMM.  GRANS. 0.0 0.00 - 0.04 K/UL    DF AUTOMATED METABOLIC PANEL, BASIC    Collection Time: 04/27/19  1:50 AM   Result Value Ref Range    Sodium 138 136 - 145 mmol/L    Potassium 3.3 (L) 3.5 - 5.1 mmol/L    Chloride 103 97 - 108 mmol/L    CO2 29 21 - 32 mmol/L    Anion gap 6 5 - 15 mmol/L    Glucose 269 (H) 65 - 100 mg/dL    BUN 24 (H) 6 - 20 MG/DL    Creatinine 1.30 (H) 0.55 - 1.02 MG/DL    BUN/Creatinine ratio 18 12 - 20      GFR est AA 50 (L) >60 ml/min/1.73m2    GFR est non-AA 41 (L) >60 ml/min/1.73m2    Calcium 8.2 (L) 8.5 - 10.1 MG/DL   GLUCOSE, POC    Collection Time: 04/27/19  7:31 AM   Result Value Ref Range    Glucose (POC) 319 (H) 65 - 100 mg/dL    Performed by Jeana Graves      Recent Labs     04/27/19  0150 04/26/19 0148   WBC 6.7 8.1   HGB 11.0* 10.7*   HCT 35.0 32.6*    256     Recent Labs     04/27/19  0150 04/26/19 0148 04/25/19  0625 04/24/19  2359    137  --  137   K 3.3* 2.9*  --  3.3*    103  --  104   CO2 29 28  --  27   BUN 24* 26*  --  31*   CREA 1.30* 1.23*  --  1.75*   * 316*  --  325*   CA 8.2* 8.2*  --  8.5   MG  --   --  2.1  --    PHOS  --   --  4.3  --      Recent Labs     04/26/19  0148 04/24/19  2359   SGOT 29 19   ALT 16 18    131*   TBILI 0.4 0.2   TP 6.3* 7.0   ALB 2.3* 2.5*   GLOB 4.0 4.5*     Recent Labs     04/25/19  0326   APTT 24.4      No results for input(s): FE, TIBC, PSAT, FERR in the last 72 hours. Lab Results   Component Value Date/Time    Folate 7.3 07/02/2018 04:27 AM      No results for input(s): PH, PCO2, PO2 in the last 72 hours.   Recent Labs     04/25/19  0625 04/24/19  2359   *  --    CKNDX 0.6  --    TROIQ 0.05* 0.07*     Lab Results   Component Value Date/Time    Cholesterol, total 260 (H) 07/02/2018 04:27 AM    HDL Cholesterol 31 07/02/2018 04:27 AM    LDL, calculated 197.2 (H) 07/02/2018 04:27 AM    Triglyceride 159 (H) 07/02/2018 04:27 AM    CHOL/HDL Ratio 8.4 (H) 07/02/2018 04:27 AM     Lab Results   Component Value Date/Time    Glucose (POC) 319 (H) 04/27/2019 07:31 AM    Glucose (POC) 298 (H) 04/26/2019 09:19 PM    Glucose (POC) 383 (H) 04/26/2019 04:28 PM    Glucose (POC) 402 (H) 04/26/2019 11:18 AM    Glucose (POC) 382 (H) 04/26/2019 08:45 AM     Lab Results   Component Value Date/Time    Color YELLOW/STRAW 04/25/2019 06:25 AM    Appearance CLEAR 04/25/2019 06:25 AM    Specific gravity 1.013 04/25/2019 06:25 AM    Specific gravity 1.010 06/08/2011 12:00 AM    pH (UA) 6.0 04/25/2019 06:25 AM    Protein 300 (A) 04/25/2019 06:25 AM    Glucose 500 (A) 04/25/2019 06:25 AM    Ketone NEGATIVE  04/25/2019 06:25 AM    Bilirubin NEGATIVE  04/25/2019 06:25 AM    Urobilinogen 1.0 04/25/2019 06:25 AM    Nitrites NEGATIVE  04/25/2019 06:25 AM    Leukocyte Esterase NEGATIVE  04/25/2019 06:25 AM    Epithelial cells FEW 04/25/2019 06:25 AM    Bacteria NEGATIVE  04/25/2019 06:25 AM    WBC 0-4 04/25/2019 06:25 AM    RBC 0-5 04/25/2019 06:25 AM       Med list reviewed  Current Facility-Administered Medications   Medication Dose Route Frequency    insulin glargine (LANTUS) injection 45 Units  45 Units SubCUTAneous BID    furosemide (LASIX) injection 20 mg  20 mg IntraVENous Q12H    insulin lispro (HUMALOG) injection 5 Units  5 Units SubCUTAneous TID WITH MEALS    acetaminophen (TYLENOL) tablet 650 mg  650 mg Oral Q4H PRN    albuterol-ipratropium (DUO-NEB) 2.5 MG-0.5 MG/3 ML  3 mL Nebulization Q4H PRN    aspirin tablet 325 mg  325 mg Oral DAILY    metoprolol succinate (TOPROL-XL) XL tablet 100 mg  100 mg Oral DAILY    montelukast (SINGULAIR) tablet 10 mg  10 mg Oral QHS    NIFEdipine ER (PROCARDIA XL) tablet 60 mg  60 mg Oral DAILY    rosuvastatin (CRESTOR) tablet 20 mg  20 mg Oral QHS    sodium chloride (NS) flush 5-40 mL  5-40 mL IntraVENous Q8H    sodium chloride (NS) flush 5-40 mL  5-40 mL IntraVENous PRN    ondansetron (ZOFRAN) injection 4 mg  4 mg IntraVENous Q4H PRN    bisacodyl (DULCOLAX) tablet 5 mg  5 mg Oral DAILY PRN    insulin lispro (HUMALOG) injection   SubCUTAneous AC&HS    glucose chewable tablet 16 g  4 Tab Oral PRN    dextrose (D50W) injection syrg 12.5-25 g  12.5-25 g IntraVENous PRN    glucagon (GLUCAGEN) injection 1 mg  1 mg IntraMUSCular PRN    arformoterol (BROVANA) neb solution 15 mcg  15 mcg Nebulization BID RT    And    budesonide (PULMICORT) 500 mcg/2 ml nebulizer suspension  500 mcg Nebulization BID RT    nicotine (NICODERM CQ) 21 mg/24 hr patch 1 Patch  1 Patch TransDERmal DAILY       Care Plan discussed with:  Patient/Family, Nurse and     Aggie Salvador MD  Internal Medicine  Date of Service: 4/27/2019

## 2019-04-28 ENCOUNTER — HOME HEALTH ADMISSION (OUTPATIENT)
Dept: HOME HEALTH SERVICES | Facility: HOME HEALTH | Age: 64
End: 2019-04-28
Payer: COMMERCIAL

## 2019-04-28 VITALS
BODY MASS INDEX: 37.28 KG/M2 | SYSTOLIC BLOOD PRESSURE: 145 MMHG | TEMPERATURE: 97.7 F | DIASTOLIC BLOOD PRESSURE: 78 MMHG | WEIGHT: 218.4 LBS | HEART RATE: 101 BPM | HEIGHT: 64 IN | OXYGEN SATURATION: 97 % | RESPIRATION RATE: 18 BRPM

## 2019-04-28 LAB
ANION GAP SERPL CALC-SCNC: 9 MMOL/L (ref 5–15)
BASOPHILS # BLD: 0.1 K/UL (ref 0–0.1)
BASOPHILS NFR BLD: 1 % (ref 0–1)
BUN SERPL-MCNC: 25 MG/DL (ref 6–20)
BUN/CREAT SERPL: 17 (ref 12–20)
CALCIUM SERPL-MCNC: 8.4 MG/DL (ref 8.5–10.1)
CHLORIDE SERPL-SCNC: 104 MMOL/L (ref 97–108)
CO2 SERPL-SCNC: 27 MMOL/L (ref 21–32)
CREAT SERPL-MCNC: 1.45 MG/DL (ref 0.55–1.02)
DIFFERENTIAL METHOD BLD: ABNORMAL
EOSINOPHIL # BLD: 0.5 K/UL (ref 0–0.4)
EOSINOPHIL NFR BLD: 6 % (ref 0–7)
ERYTHROCYTE [DISTWIDTH] IN BLOOD BY AUTOMATED COUNT: 14.3 % (ref 11.5–14.5)
GLUCOSE BLD STRIP.AUTO-MCNC: 280 MG/DL (ref 65–100)
GLUCOSE BLD STRIP.AUTO-MCNC: 334 MG/DL (ref 65–100)
GLUCOSE SERPL-MCNC: 240 MG/DL (ref 65–100)
HCT VFR BLD AUTO: 37.4 % (ref 35–47)
HGB BLD-MCNC: 11.9 G/DL (ref 11.5–16)
IMM GRANULOCYTES # BLD AUTO: 0 K/UL (ref 0–0.04)
IMM GRANULOCYTES NFR BLD AUTO: 0 % (ref 0–0.5)
LYMPHOCYTES # BLD: 3.3 K/UL (ref 0.8–3.5)
LYMPHOCYTES NFR BLD: 40 % (ref 12–49)
MCH RBC QN AUTO: 32.2 PG (ref 26–34)
MCHC RBC AUTO-ENTMCNC: 31.8 G/DL (ref 30–36.5)
MCV RBC AUTO: 101.4 FL (ref 80–99)
MONOCYTES # BLD: 0.7 K/UL (ref 0–1)
MONOCYTES NFR BLD: 8 % (ref 5–13)
NEUTS SEG # BLD: 3.7 K/UL (ref 1.8–8)
NEUTS SEG NFR BLD: 45 % (ref 32–75)
NRBC # BLD: 0 K/UL (ref 0–0.01)
NRBC BLD-RTO: 0 PER 100 WBC
PLATELET # BLD AUTO: 300 K/UL (ref 150–400)
PMV BLD AUTO: 9.7 FL (ref 8.9–12.9)
POTASSIUM SERPL-SCNC: 3.7 MMOL/L (ref 3.5–5.1)
RBC # BLD AUTO: 3.69 M/UL (ref 3.8–5.2)
SERVICE CMNT-IMP: ABNORMAL
SERVICE CMNT-IMP: ABNORMAL
SODIUM SERPL-SCNC: 140 MMOL/L (ref 136–145)
WBC # BLD AUTO: 8.3 K/UL (ref 3.6–11)

## 2019-04-28 PROCEDURE — 36415 COLL VENOUS BLD VENIPUNCTURE: CPT

## 2019-04-28 PROCEDURE — 74011250637 HC RX REV CODE- 250/637: Performed by: INTERNAL MEDICINE

## 2019-04-28 PROCEDURE — 82962 GLUCOSE BLOOD TEST: CPT

## 2019-04-28 PROCEDURE — 97116 GAIT TRAINING THERAPY: CPT

## 2019-04-28 PROCEDURE — 74011000250 HC RX REV CODE- 250: Performed by: INTERNAL MEDICINE

## 2019-04-28 PROCEDURE — 80048 BASIC METABOLIC PNL TOTAL CA: CPT

## 2019-04-28 PROCEDURE — 74011636637 HC RX REV CODE- 636/637: Performed by: INTERNAL MEDICINE

## 2019-04-28 PROCEDURE — 85025 COMPLETE CBC W/AUTO DIFF WBC: CPT

## 2019-04-28 PROCEDURE — 94640 AIRWAY INHALATION TREATMENT: CPT

## 2019-04-28 PROCEDURE — 74011250636 HC RX REV CODE- 250/636: Performed by: INTERNAL MEDICINE

## 2019-04-28 RX ORDER — FUROSEMIDE 40 MG/1
40 TABLET ORAL 2 TIMES DAILY
Status: DISCONTINUED | OUTPATIENT
Start: 2019-04-28 | End: 2019-04-28 | Stop reason: HOSPADM

## 2019-04-28 RX ORDER — IBUPROFEN 200 MG
1 TABLET ORAL DAILY
Qty: 30 PATCH | Refills: 0 | Status: SHIPPED | OUTPATIENT
Start: 2019-04-29 | End: 2019-05-29

## 2019-04-28 RX ORDER — FUROSEMIDE 40 MG/1
40 TABLET ORAL DAILY
Qty: 30 TAB | Refills: 0 | Status: SHIPPED | OUTPATIENT
Start: 2019-04-28 | End: 2021-12-10

## 2019-04-28 RX ADMIN — NIFEDIPINE 60 MG: 60 TABLET, FILM COATED, EXTENDED RELEASE ORAL at 08:11

## 2019-04-28 RX ADMIN — INSULIN LISPRO 7 UNITS: 100 INJECTION, SOLUTION INTRAVENOUS; SUBCUTANEOUS at 11:42

## 2019-04-28 RX ADMIN — IPRATROPIUM BROMIDE AND ALBUTEROL SULFATE 3 ML: .5; 3 SOLUTION RESPIRATORY (INHALATION) at 07:40

## 2019-04-28 RX ADMIN — FUROSEMIDE 40 MG: 10 INJECTION, SOLUTION INTRAMUSCULAR; INTRAVENOUS at 08:03

## 2019-04-28 RX ADMIN — BUDESONIDE 500 MCG: 0.5 INHALANT RESPIRATORY (INHALATION) at 07:40

## 2019-04-28 RX ADMIN — INSULIN LISPRO 5 UNITS: 100 INJECTION, SOLUTION INTRAVENOUS; SUBCUTANEOUS at 08:03

## 2019-04-28 RX ADMIN — METOPROLOL SUCCINATE 100 MG: 50 TABLET, EXTENDED RELEASE ORAL at 08:03

## 2019-04-28 RX ADMIN — INSULIN GLARGINE 45 UNITS: 100 INJECTION, SOLUTION SUBCUTANEOUS at 08:03

## 2019-04-28 RX ADMIN — ASPIRIN 325 MG: 325 TABLET ORAL at 08:03

## 2019-04-28 RX ADMIN — INSULIN LISPRO 5 UNITS: 100 INJECTION, SOLUTION INTRAVENOUS; SUBCUTANEOUS at 11:43

## 2019-04-28 RX ADMIN — INSULIN LISPRO 5 UNITS: 100 INJECTION, SOLUTION INTRAVENOUS; SUBCUTANEOUS at 08:04

## 2019-04-28 NOTE — DISCHARGE SUMMARY
Discharge Summary     Patient:  Jourdan Abdullahi       MRN: [id number]       YOB: 1955       Age: 58 y.o. Date of admission:  4/24/2019    Date of discharge:  4/28/2019    Primary care provider: Dr. Lord Delia MD    Admitting provider:  Yarely Ibarra MD    Discharging provider:  Desirae De Leon [address] [phone]. If unavailable, call [phone] and ask the  to page the triage hospitalist.    Consultations  · IP CONSULT TO CARDIOLOGY    Procedures  · * No surgery found *    Discharge destination: home with home health. The patient is stable for discharge. Admission diagnosis  · NSTEMI (non-ST elevated myocardial infarction) ([facility] [address] [I21.4]    Current Discharge Medication List      START taking these medications    Details   nicotine (NICODERM CQ) 21 mg/24 hr 1 Patch by TransDERmal route daily for 30 days. Qty: 30 Patch, Refills: 0      furosemide (LASIX) 40 mg tablet Take 1 Tab by mouth daily. Qty: 30 Tab, Refills: 0         CONTINUE these medications which have NOT CHANGED    Details   NIFEdipine ER (PROCARDIA XL) 60 mg ER tablet Take 1 Tab by mouth daily. Qty: 30 Tab, Refills: 0      rosuvastatin (CRESTOR) 20 mg tablet Take 1 Tab by mouth nightly. Qty: 30 Tab, Refills: 0      insulin NPH/insulin regular (NOVOLIN 70/30 U-100 INSULIN) 100 unit/mL (70-30) injection 45 Units by SubCUTAneous route two (2) times a day. Qty: 10 mL, Refills: 0      fluticasone-salmeterol (ADVAIR DISKUS) 250-50 mcg/dose diskus inhaler Take 1 Puff by inhalation two (2) times daily as needed. metoprolol succinate (TOPROL XL) 100 mg tablet Take 100 mg by mouth daily. albuterol-ipratropium (DUO-NEB) 2.5 mg-0.5 mg/3 ml nebu 3 mL by Nebulization route every six (6) hours as needed. Qty: 30 Nebule, Refills: 0      montelukast (SINGULAIR) 10 mg tablet Take 1 Tab by mouth nightly.   Qty: 30 Tab, Refills: 0      aspirin (ASPIRIN) 325 mg tablet Take 325 mg by mouth daily. STOP taking these medications       predniSONE (DELTASONE) 10 mg tablet Comments:   Reason for Stopping:         lisinopril (PRINIVIL, ZESTRIL) 40 mg tablet Comments:   Reason for Stopping:         bumetanide (BUMEX) 0.5 mg tablet Comments:   Reason for Stopping:                Follow-up Information     Follow up With Specialties Details Why Contact Info    Raimundo Milian MD Cardiology On 4/30/2019 Cardiology follow up appointment 4/30/19 at 12:30 PM. [address] [place] [address]  [address] [place] [address]      Ivanna Gómez MD Family Practice In 3 days  [facility] [address]  [phone]            Final discharge diagnoses and brief hospital course  This is a 78-year-old woman with a past medical history significant for coronary artery disease status post stent placement, chronic diastolic congestive heart failure, dyslipidemia, hypertension, type 2 diabetes, COPD, obstructive sleep apnea, pulmonary nodules, was in her usual state of health until a couple of weeks ago when the patient started experiencing worsening of the bilateral leg swelling 4/24/2019    Acute on chronic diastolic heart failure - improved  - presented with bilateral leg swelling abd abdominal swelling  - bnp 380  - CXR: no acute changes  - venous duplex: neg DVT  - echo: EF 50-50%, grade 1 diastolic dysfunction  - changed to po lasix     Ruled out NSTEMI  - no chest pain now  - no significant EKG changes  - mild elevation of troponin  - cardiology on board  - dced heparin drip  - stress test : Negative      BRENNAN - mild worsening cr 1.45 on dc  - possible due to diuresis  - cr 1.75 on POA, baseline 1.0- 1.1  - avoid nephrotoxins  - will monitor renal function  - hold lisinopril      Hypokalemia - replaced     Diabetes with hyperglycemia  - BG elevated  - patient takes Novolin 70/30 at home ( can not afford lantus)  - increased lantus 45u bid, lispro 5U with meals, ISS  - DM education  - BG management per PCP    Recommendations:  Pcp f/u in 2 days  BMP in 2 days  Cardiology in 2 days        Physical examination at discharge  Visit Vitals  /49 (BP 1 Location: Right arm, BP Patient Position: At rest)   Pulse 100   Temp 98.2 °F (36.8 °C)   Resp 18   Ht 5' 4\" (1.626 m)   Wt 99.1 kg (218 lb 6.4 oz)   SpO2 93%   Breastfeeding? No   BMI 37.49 kg/m²     Gen: NAD  HEENT: anicteric sclerae, normal conjunctiva, oropharynx clear, MM moist  Neck: supple, trachea midline, no adenopathy  Heart: RRR, S1 s2 heard, 1 + edema  Lungs: decreased breath sounds at bases  Abd: soft, NT, ND, BS+, no organomegaly  Extr: warm  Skin: dry, no rash  Neuro: CN II-XII grossly intact, normal speech, moves all extremities  Psych: normal mood, appropriate affect        Pertinent imaging studies:    none    Recent Labs     04/28/19 0212 04/27/19 0150 04/26/19 0148   WBC 8.3 6.7 8.1   HGB 11.9 11.0* 10.7*   HCT 37.4 35.0 32.6*    261 256     Recent Labs     04/28/19 0212 04/27/19 0150 04/26/19 0148    138 137   K 3.7 3.3* 2.9*    103 103   CO2 27 29 28   BUN 25* 24* 26*   CREA 1.45* 1.30* 1.23*   * 269* 316*   CA 8.4* 8.2* 8.2*     Recent Labs     04/26/19 0148   SGOT 29      TP 6.3*   ALB 2.3*   GLOB 4.0     No results for input(s): INR, PTP, APTT in the last 72 hours. No lab exists for component: INREXT   No results for input(s): FE, TIBC, PSAT, FERR in the last 72 hours. No results for input(s): PH, PCO2, PO2 in the last 72 hours. No results for input(s): CPK, CKMB in the last 72 hours.     No lab exists for component: TROPONINI  No components found for: [place]    Chronic Diagnoses:    Problem List as of 4/28/2019 Date Reviewed: 4/25/2019          Codes Class Noted - Resolved    Chest pain at rest ICD-10-CM: R07.9  ICD-9-CM: 786.50  2/21/2014 - Present        ASHD (arteriosclerotic heart disease) ICD-10-CM: I25.10  ICD-9-CM: 414.00  2/21/2014 - Present        Diabetes mellitus ([facility] ICD-10-CM: E11.9  ICD-9-CM: 250.00  2/21/2014 - Present        Hypertension, benign ICD-10-CM: I10  ICD-9-CM: 401.1  2/21/2014 - Present        ASO (arteriosclerosis obliterans) ICD-10-CM: I70.90  ICD-9-CM: 440.9  2/21/2014 - Present        Hypercholesteremia ICD-10-CM: E78.00  ICD-9-CM: 272.0  2/21/2014 - Present        * (Principal) NSTEMI (non-ST elevated myocardial infarction) ([facility] ICD-10-CM: I21.4  ICD-9-CM: 410.70  4/25/2019 - Present        Bilateral leg edema ICD-10-CM: R60.0  ICD-9-CM: 782.3  3/15/2018 - Present        RESOLVED: CHF, acute on chronic (HCC) ICD-10-CM: I50.9  ICD-9-CM: 428.0  7/2/2018 - 7/9/2018        RESOLVED: Fever ICD-10-CM: R50.9  ICD-9-CM: 780.60  3/15/2018 - 3/19/2018        RESOLVED: SIRS (systemic inflammatory response syndrome) ([facility] ICD-10-CM: R65.10  ICD-9-CM: 995.90  3/15/2018 - 3/19/2018        RESOLVED: Asthma exacerbation ICD-10-CM: J45.901  ICD-9-CM: 493.92  3/15/2018 - 3/19/2018              Time spent on discharge related activities today greater than 30 minutes.       Signed:  Kimberlee Main MD                 Hospitalist, Internal Medicine      Cc: Graham Breen MD

## 2019-04-28 NOTE — PROGRESS NOTES
Problem: Pressure Injury - Risk of  Goal: *Prevention of pressure injury  Description  Document Waldo Scale and appropriate interventions in the flowsheet. Outcome: Progressing Towards Goal     Problem: Pain  Goal: *Control of Pain  Outcome: Progressing Towards Goal     Problem: Falls - Risk of  Goal: *Absence of Falls  Description  Document Kei Yodernessa Fall Risk and appropriate interventions in the flowsheet.   Outcome: Progressing Towards Goal     Problem: Heart Failure: Day 3  Goal: Activity/Safety  Outcome: Progressing Towards Goal  Goal: Diagnostic Test/Procedures  Outcome: Progressing Towards Goal  Goal: Nutrition/Diet  Outcome: Progressing Towards Goal  Goal: Discharge Planning  Outcome: Progressing Towards Goal  Goal: Medications  Outcome: Progressing Towards Goal  Goal: Respiratory  Outcome: Progressing Towards Goal  Goal: Treatments/Interventions/Procedures  Outcome: Progressing Towards Goal  Goal: Psychosocial  Outcome: Progressing Towards Goal  Goal: *Oxygen saturation within defined limits  Outcome: Progressing Towards Goal  Goal: *Hemodynamically stable  Outcome: Progressing Towards Goal  Goal: *Optimal pain control at patient's stated goal  Outcome: Progressing Towards Goal  Goal: *Anxiety reduced or absent  Outcome: Progressing Towards Goal  Goal: *Demonstrates progressive activity  Outcome: Progressing Towards Goal

## 2019-04-28 NOTE — PROGRESS NOTES
Cardiology Progress Note                                        Admit Date: 4/24/2019    Assessment/Plan:     Volume overload/ADHF; improved  CKD; near baseline  CAD; stable and stress test during this admission was negative    Kodi Carter is a 58 y.o. female with     PROBLEM LIST:  Patient Active Problem List    Diagnosis Date Noted    Chest pain at rest 02/21/2014     Priority: 1 - One    ASHD (arteriosclerotic heart disease) 02/21/2014     Priority: 1 - One    Diabetes mellitus ([facility] 02/21/2014     Priority: 2 - Two    Hypertension, benign 02/21/2014     Priority: 2 - Two    ASO (arteriosclerosis obliterans) 02/21/2014     Priority: 2 - Two    Hypercholesteremia 02/21/2014     Priority: 3 - Three    NSTEMI (non-ST elevated myocardial infarction) ([facility] 04/25/2019    Bilateral leg edema 03/15/2018         Subjective:     Jacki Romero reports none. Visit Vitals  /61   Pulse (!) 103   Temp 98 °F (36.7 °C)   Resp 18   Ht 5' 4\" (1.626 m)   Wt 218 lb 6.4 oz (99.1 kg)   SpO2 94%   Breastfeeding? No   BMI 37.49 kg/m²       Intake/Output Summary (Last 24 hours) at 4/28/2019 0920  Last data filed at 4/28/2019 0551  Gross per 24 hour   Intake 581 ml   Output 2050 ml   Net -1469 ml       Objective:      Physical Exam:  HEENT: Perrla, EOMI  Neck: No JVD,  No thyroidmegaly  Resp: CTA bilaterally;  No wheezes or rales  CV: RRR s1s2 No murmur no s3  Abd:Soft, Nontender  Ext: No edema  Neuro: Alert and oriented; Nonfocal  Skin: Warm, Dry, Intact  Pulses: 2+ DP/PT/Rad      Telemetry: normal sinus rhythm    Current Facility-Administered Medications   Medication Dose Route Frequency    insulin glargine (LANTUS) injection 45 Units  45 Units SubCUTAneous BID    furosemide (LASIX) injection 40 mg  40 mg IntraVENous Q12H    insulin lispro (HUMALOG) injection 5 Units  5 Units SubCUTAneous TID WITH MEALS    acetaminophen (TYLENOL) tablet 650 mg  650 mg Oral Q4H PRN    albuterol-ipratropium (DUO-NEB) 2.5 MG-0.5 MG/3 ML  3 mL Nebulization Q4H PRN    aspirin tablet 325 mg  325 mg Oral DAILY    metoprolol succinate (TOPROL-XL) XL tablet 100 mg  100 mg Oral DAILY    montelukast (SINGULAIR) tablet 10 mg  10 mg Oral QHS    NIFEdipine ER (PROCARDIA XL) tablet 60 mg  60 mg Oral DAILY    rosuvastatin (CRESTOR) tablet 20 mg  20 mg Oral QHS    sodium chloride (NS) flush 5-40 mL  5-40 mL IntraVENous Q8H    sodium chloride (NS) flush 5-40 mL  5-40 mL IntraVENous PRN    ondansetron (ZOFRAN) injection 4 mg  4 mg IntraVENous Q4H PRN    bisacodyl (DULCOLAX) tablet 5 mg  5 mg Oral DAILY PRN    insulin lispro (HUMALOG) injection   SubCUTAneous AC&HS    glucose chewable tablet 16 g  4 Tab Oral PRN    dextrose (D50W) injection syrg 12.5-25 g  12.5-25 g IntraVENous PRN    glucagon (GLUCAGEN) injection 1 mg  1 mg IntraMUSCular PRN    arformoterol (BROVANA) neb solution 15 mcg  15 mcg Nebulization BID RT    And    budesonide (PULMICORT) 500 mcg/2 ml nebulizer suspension  500 mcg Nebulization BID RT    nicotine (NICODERM CQ) 21 mg/24 hr patch 1 Patch  1 Patch TransDERmal DAILY         Data Review:   Labs:    Recent Results (from the past 24 hour(s))   GLUCOSE, POC    Collection Time: 04/27/19 11:09 AM   Result Value Ref Range    Glucose (POC) 353 (H) 65 - 100 mg/dL    Performed by Unkown     GLUCOSE, POC    Collection Time: 04/27/19  4:10 PM   Result Value Ref Range    Glucose (POC) 297 (H) 65 - 100 mg/dL    Performed by NIKOLAS HUIZAR(CON)    GLUCOSE, POC    Collection Time: 04/27/19  9:46 PM   Result Value Ref Range    Glucose (POC) 292 (H) 65 - 100 mg/dL    Performed by Unkown     CBC WITH AUTOMATED DIFF    Collection Time: 04/28/19  2:12 AM   Result Value Ref Range    WBC 8.3 3.6 - 11.0 K/uL    RBC 3.69 (L) 3.80 - 5.20 M/uL    HGB 11.9 11.5 - 16.0 g/dL    HCT 37.4 35.0 - 47.0 %    .4 (H) 80.0 - 99.0 FL    MCH 32.2 26.0 - 34.0 PG    MCHC 31.8 30.0 - 36.5 g/dL    RDW 14.3 11.5 - 14.5 %    PLATELET 986 993 - 547 K/uL    MPV 9.7 8.9 - 12.9 FL    NRBC 0.0 0  WBC    ABSOLUTE NRBC 0.00 0.00 - 0.01 K/uL    NEUTROPHILS 45 32 - 75 %    LYMPHOCYTES 40 12 - 49 %    MONOCYTES 8 5 - 13 %    EOSINOPHILS 6 0 - 7 %    BASOPHILS 1 0 - 1 %    IMMATURE GRANULOCYTES 0 0.0 - 0.5 %    ABS. NEUTROPHILS 3.7 1.8 - 8.0 K/UL    ABS. LYMPHOCYTES 3.3 0.8 - 3.5 K/UL    ABS. MONOCYTES 0.7 0.0 - 1.0 K/UL    ABS. EOSINOPHILS 0.5 (H) 0.0 - 0.4 K/UL    ABS. BASOPHILS 0.1 0.0 - 0.1 K/UL    ABS. IMM.  GRANS. 0.0 0.00 - 0.04 K/UL    DF AUTOMATED     METABOLIC PANEL, BASIC    Collection Time: 04/28/19  2:12 AM   Result Value Ref Range    Sodium 140 136 - 145 mmol/L    Potassium 3.7 3.5 - 5.1 mmol/L    Chloride 104 97 - 108 mmol/L    CO2 27 21 - 32 mmol/L    Anion gap 9 5 - 15 mmol/L    Glucose 240 (H) 65 - 100 mg/dL    BUN 25 (H) 6 - 20 MG/DL    Creatinine 1.45 (H) 0.55 - 1.02 MG/DL    BUN/Creatinine ratio 17 12 - 20      GFR est AA 44 (L) >60 ml/min/1.73m2    GFR est non-AA 36 (L) >60 ml/min/1.73m2    Calcium 8.4 (L) 8.5 - 10.1 MG/DL   GLUCOSE, POC    Collection Time: 04/28/19  7:45 AM   Result Value Ref Range    Glucose (POC) 280 (H) 65 - 100 mg/dL    Performed by Guille Reich

## 2019-04-28 NOTE — DISCHARGE INSTRUCTIONS
Smoking Cessation Program: This is a free, phone/text/email based, smoking cessation program. The program is individualized to meet each patient's needs. To enroll use the link - [email or web address] or text Ayleen Bates to [phone] from any smart phone. Please bring this form with you to show your primary care provider at your follow-up appointment. Primary care provider:  Dr. Andrew Anderson MD    Discharging provider:  Fide Boss MD    You have been admitted to the hospital with the following diagnoses:  · NSTEMI (non-ST elevated myocardial infarction) [facility]) [I21.4]    FOLLOW-UP CARE RECOMMENDATIONS:    Your kidney function is mildly deteriorated . Advise to hold lisinopril. Blood work in 2 days and discuss with primary care physician. Your blood sugars are elevated. Advised to check twice daily and log them to show to your primary care physician. APPOINTMENTS:  · Follow-up with primary care provider, Dr. Andrew Anderson MD  -  Please call [phone] shortly after discharge to set up an appointment to be seen in  3 days   · Cardiology in 3 days    FOLLOW-UP TESTS recommended: bmp in 2 days    PENDING TEST RESULTS:  At the time of your discharge the following test results are still pending: none  Please make sure you review these results with your outpatient follow-up provider(s). SYMPTOMS to watch for: chest pain, shortness of breath, fever, chills, nausea, vomiting, diarrhea, change in mentation, falling, weakness, bleeding. DIET/what to eat:  Cardiac Diet and Diabetic Diet    ACTIVITY:  Activity as tolerated    What to do if new or unexpected symptoms occur? If you experience any of the above symptoms (or should other concerns or questions arise after discharge) please call your primary care physician. Return to the emergency room if you cannot get hold of your doctor. · It is very important that you keep your follow-up appointment(s).   · Please bring discharge papers, medication list (and/or medication bottles) to your follow-up appointments for review by your outpatient provider(s). · Please check the list of medications and be sure it includes every medication (even non-prescription medications) that your provider wants you to take. · It is important that you take the medication exactly as they are prescribed. · Keep your medication in the bottles provided by the pharmacist and keep a list of the medication names, dosages, and times to be taken in your wallet. · Do not take other medications without consulting your doctor. · If you have any questions about your medications or other instructions, please talk to your nurse or care provider before you leave the hospital.    I understand that if any problems occur once I am at home I am to contact my physician. These instructions were explained to me and I had the opportunity to ask questions.

## 2019-04-28 NOTE — PROGRESS NOTES
Pharmacist Discharge Medication Reconciliation    Discharging Provider: Dr. Lyndsay Lynch PMH:   Past Medical History:   Diagnosis Date    Asthma     CAD (coronary artery disease)     CHF (congestive heart failure) ([facility]     COPD (chronic obstructive pulmonary disease) ([facility]     Diabetes ([facility]     Hypertension      Chief Complaint for this Admission:   Chief Complaint   Patient presents with    Leg Swelling     Allergies: Ivp dye [fd and c blue no.1]    Discharge Medications:   Current Discharge Medication List        START taking these medications    Details   nicotine (NICODERM CQ) 21 mg/24 hr 1 Patch by TransDERmal route daily for 30 days. Qty: 30 Patch, Refills: 0      furosemide (LASIX) 40 mg tablet Take 1 Tab by mouth daily. Qty: 30 Tab, Refills: 0           CONTINUE these medications which have NOT CHANGED    Details   NIFEdipine ER (PROCARDIA XL) 60 mg ER tablet Take 1 Tab by mouth daily. Qty: 30 Tab, Refills: 0      rosuvastatin (CRESTOR) 20 mg tablet Take 1 Tab by mouth nightly. Qty: 30 Tab, Refills: 0      insulin NPH/insulin regular (NOVOLIN 70/30 U-100 INSULIN) 100 unit/mL (70-30) injection 45 Units by SubCUTAneous route two (2) times a day. Qty: 10 mL, Refills: 0      fluticasone-salmeterol (ADVAIR DISKUS) 250-50 mcg/dose diskus inhaler Take 1 Puff by inhalation two (2) times daily as needed. metoprolol succinate (TOPROL XL) 100 mg tablet Take 100 mg by mouth daily. albuterol-ipratropium (DUO-NEB) 2.5 mg-0.5 mg/3 ml nebu 3 mL by Nebulization route every six (6) hours as needed. Qty: 30 Nebule, Refills: 0      montelukast (SINGULAIR) 10 mg tablet Take 1 Tab by mouth nightly. Qty: 30 Tab, Refills: 0      aspirin (ASPIRIN) 325 mg tablet Take 325 mg by mouth daily.            STOP taking these medications       predniSONE (DELTASONE) 10 mg tablet Comments:   Reason for Stopping:         lisinopril (PRINIVIL, ZESTRIL) 40 mg tablet Comments:   Reason for Stopping:         bumetanide (BUMEX) 0.5 mg tablet Comments:   Reason for Stopping:               The patient's chart, MAR and AVS were reviewed by Shannon Stovall.

## 2019-04-28 NOTE — PROGRESS NOTES
Bedside RN performed patient education and medication education. Discharge concerns initiated and discussed with patient, including clarification on \"who\" assists the patient at their home and instructions for when the home going patient should call their provider after discharge. Opportunity for questions and clarification was provided. Patient receptive to education: YES  Patient stated: verbal w teachback  Barriers to Education: n/a  Diagnosis Education given:  YES    Length of stay: 3  Expected Day of Discharge: 3  Ask if they have \"Help at Home\" & add to white board?   YES    Education Day #: 3    Medication Education Given:  YES  M in the box Medication name: nicotine, lasix    Pt aware of [facility]CA[facility] survey: YES

## 2019-04-28 NOTE — PROGRESS NOTES
Occupational therapy [phone] -   04.28.2019    Orders acknowledged and chart reviewed in prep for OT evaluation. RN reporting patient completing ADLs and walking back and forth to bathroom with supervision. Patient noted to have D/C orders and be discharging home with HHPT and assist from family. Discussed OT need with patient, she reports not having any OT needs at this time. Will sign off. Thank you. Mg Keyes MS, OTR/L

## 2019-04-28 NOTE — PROGRESS NOTES
Care Management - Received call from patient's nurse that patient will be discharged to home today and will need home health arranged. Met with patient in the room. Patient lives alone. She has multiple family members and friends who check on her. Patient lives alone and has several family members that check on her. Patient states she is independent with her ADLs and drives. Patient confirmed her address and emergency contacts. She asks that if she does not answer her phone, that providers call her daughter-Mona. She states her daughter will be able to come pick her up after she gets off work today at 5:00 PM.    Offered freedom of choice for home health. Patient agreeable to [address] Sent referral vial [facility]. Spoke with Varghese Armstrong at [address] ([phone]). She will look at referral in a bit. She will let this CM know via CC if they can accept patient. If they cannot accept patient, she will call this CM. DME: rollator-uses when tired  ADLs: independent  Pharmacy: [facility] on [address]: [address]  Previous SNF/rehab: none  Insurance: [employer] [facility]  ER Contacts: Kaylin Deirdre ([phone])   SonRa Romero ([phone])    Reason for Admission:   Acute on chronic CHF                   RRAT Score:      10               Plan for utilizing home health: [address] for PT and SN                    Current Advanced Directive/Advance Care Plan: not on file    Likelihood of Readmission:  moderate                         Transition of Care Plan:      Home with home health for PT and SN.      Care Management Interventions  PCP Verified by CM: Yes(Dr. Sandrita Acevedo)  Transition of Care Consult (CM Consult): Home Health  Discharge Durable Medical Equipment: No  Physical Therapy Consult: Yes  Occupational Therapy Consult: Yes  Speech Therapy Consult: No  Current Support Network: Lives Alone  Plan discussed with Pt/Family/Caregiver: Yes  Freedom of Choice Offered: Yes  [address] Provided?: No  Discharge Location  Discharge Placement: Home with home health    [facility]    12:16 PM. Varghese Armstrong with [address] accepted patient for home health.    [facility]

## 2019-04-28 NOTE — PROGRESS NOTES
Problem: Mobility Impaired (Adult and Pediatric)  Goal: *Acute Goals and Plan of Care (Insert Text)  Description  Physical Therapy Goals  Initiated 4/27/2019  1. Patient will move from supine to sit and sit to supine  in bed with modified independence within 7 day(s). 2.  Patient will transfer from bed to chair and chair to bed with modified independence using the least restrictive device within 7 day(s). 3.  Patient will perform sit to stand with modified independence within 7 day(s). 4.  Patient will ambulate with modified independence for 150 feet with the least restrictive device within 7 day(s). 5.  Patient will ascend/descend 4 stairs with 1 handrail(s) with modified independence within 7 day(s). Outcome: Progressing Towards Goal     PHYSICAL THERAPY TREATMENT  Patient: Mary Carmen Ceballos (38 y.o. female)  Date: 4/28/2019  Diagnosis: NSTEMI (non-ST elevated myocardial infarction) [facility]) [I21.4] NSTEMI (non-ST elevated myocardial infarction) [facility])       Precautions:    Chart, physical therapy assessment, plan of care and goals were reviewed. ASSESSMENT:  Patient sitting on EOB upon arrival stating that she walked to the restroom independently due to need to void. Patient amb 110 ft without AD with CGA to SBA without loss of balance. Patient able to perform sit to stand transfers with SBA. Patient reports that she feels stronger today as compared to yesterday's treatment session. Patient owns RW and can use at home when she feels she is weaker. Patient will benefit from HHPT at discharge to improve overall strength and mobility. Progression toward goals:  ?    Improving appropriately and progressing toward goals  ? Improving slowly and progressing toward goals  ? Not making progress toward goals and plan of care will be adjusted     PLAN:  Patient continues to benefit from skilled intervention to address the above impairments. Continue treatment per established plan of care.   Discharge Recommendations:  Home Health and None  Further Equipment Recommendations for Discharge:  none, but owns RW       SUBJECTIVE:   Patient stated ? I am ready to go home. ?    OBJECTIVE DATA SUMMARY:   Critical Behavior:  Neurologic State: Alert  Orientation Level: Oriented X4  Cognition: Appropriate decision making, Appropriate for age attention/concentration, Appropriate safety awareness, Follows commands     Functional Mobility Training:  Bed Mobility:     Supine to Sit: Supervision  Sit to Supine: Supervision           Transfers:  Sit to Stand: Supervision;Stand-by assistance  Stand to Sit: Supervision;Stand-by assistance                    Balance:  Sitting: Intact  Standing: Intact  Ambulation/Gait Training:  Distance (ft): 110 Feet (ft)  Assistive Device: Gait belt  Ambulation - Level of Assistance: Stand-by assistance        Gait Abnormalities: Decreased step clearance        Base of Support: Widened     Speed/Maryann: Pace decreased (<100 feet/min)          Pain:  Pain Scale 1: Numeric (0 - 10)  Pain Intensity 1: 0              Activity Tolerance:   VSS  Please refer to the flowsheet for vital signs taken during this treatment. After treatment:   ?    Patient left in no apparent distress sitting up in chair  ? Patient left in no apparent distress in bed  ? Call bell left within reach  ? Nursing notified  ? Caregiver present  ?     Bed alarm activated    COMMUNICATION/COLLABORATION:   The patient?s plan of care was discussed with: Registered Nurse    Zenobia Rust, PT   Time Calculation: 14 mins

## 2019-04-29 ENCOUNTER — HOME CARE VISIT (OUTPATIENT)
Dept: SCHEDULING | Facility: HOME HEALTH | Age: 64
End: 2019-04-29
Payer: COMMERCIAL

## 2019-04-29 VITALS
DIASTOLIC BLOOD PRESSURE: 78 MMHG | BODY MASS INDEX: 37.3 KG/M2 | HEIGHT: 64 IN | OXYGEN SATURATION: 99 % | HEART RATE: 91 BPM | SYSTOLIC BLOOD PRESSURE: 138 MMHG | WEIGHT: 218.48 LBS | TEMPERATURE: 98.2 F

## 2019-04-29 PROCEDURE — G0299 HHS/HOSPICE OF RN EA 15 MIN: HCPCS

## 2019-05-01 ENCOUNTER — HOME CARE VISIT (OUTPATIENT)
Dept: SCHEDULING | Facility: HOME HEALTH | Age: 64
End: 2019-05-01
Payer: COMMERCIAL

## 2019-05-01 VITALS
RESPIRATION RATE: 18 BRPM | HEART RATE: 101 BPM | OXYGEN SATURATION: 97 % | TEMPERATURE: 98.3 F | DIASTOLIC BLOOD PRESSURE: 82 MMHG | SYSTOLIC BLOOD PRESSURE: 144 MMHG

## 2019-05-01 PROCEDURE — G0299 HHS/HOSPICE OF RN EA 15 MIN: HCPCS

## 2019-05-01 PROCEDURE — G0151 HHCP-SERV OF PT,EA 15 MIN: HCPCS

## 2019-05-02 VITALS
SYSTOLIC BLOOD PRESSURE: 128 MMHG | RESPIRATION RATE: 18 BRPM | HEART RATE: 94 BPM | WEIGHT: 221.7 LBS | DIASTOLIC BLOOD PRESSURE: 74 MMHG | OXYGEN SATURATION: 95 % | BODY MASS INDEX: 38.05 KG/M2 | TEMPERATURE: 98.1 F

## 2019-05-03 ENCOUNTER — HOME CARE VISIT (OUTPATIENT)
Dept: SCHEDULING | Facility: HOME HEALTH | Age: 64
End: 2019-05-03
Payer: COMMERCIAL

## 2019-05-03 PROCEDURE — G0300 HHS/HOSPICE OF LPN EA 15 MIN: HCPCS

## 2019-05-05 VITALS
DIASTOLIC BLOOD PRESSURE: 82 MMHG | SYSTOLIC BLOOD PRESSURE: 140 MMHG | HEART RATE: 98 BPM | TEMPERATURE: 98.2 F | RESPIRATION RATE: 20 BRPM | OXYGEN SATURATION: 98 %

## 2019-05-07 ENCOUNTER — HOME CARE VISIT (OUTPATIENT)
Dept: SCHEDULING | Facility: HOME HEALTH | Age: 64
End: 2019-05-07
Payer: COMMERCIAL

## 2019-05-07 VITALS — RESPIRATION RATE: 16 BRPM | TEMPERATURE: 98 F | OXYGEN SATURATION: 98 %

## 2019-05-07 PROCEDURE — G0151 HHCP-SERV OF PT,EA 15 MIN: HCPCS

## 2019-05-10 ENCOUNTER — HOME CARE VISIT (OUTPATIENT)
Dept: SCHEDULING | Facility: HOME HEALTH | Age: 64
End: 2019-05-10
Payer: COMMERCIAL

## 2019-05-11 ENCOUNTER — HOME CARE VISIT (OUTPATIENT)
Dept: SCHEDULING | Facility: HOME HEALTH | Age: 64
End: 2019-05-11
Payer: COMMERCIAL

## 2019-05-13 ENCOUNTER — HOME CARE VISIT (OUTPATIENT)
Dept: SCHEDULING | Facility: HOME HEALTH | Age: 64
End: 2019-05-13
Payer: COMMERCIAL

## 2019-05-14 ENCOUNTER — HOME CARE VISIT (OUTPATIENT)
Dept: SCHEDULING | Facility: HOME HEALTH | Age: 64
End: 2019-05-14
Payer: COMMERCIAL

## 2019-05-17 ENCOUNTER — HOME CARE VISIT (OUTPATIENT)
Dept: SCHEDULING | Facility: HOME HEALTH | Age: 64
End: 2019-05-17
Payer: COMMERCIAL

## 2019-05-17 ENCOUNTER — HOME CARE VISIT (OUTPATIENT)
Dept: HOME HEALTH SERVICES | Facility: HOME HEALTH | Age: 64
End: 2019-05-17
Payer: COMMERCIAL

## 2019-05-22 ENCOUNTER — HOME CARE VISIT (OUTPATIENT)
Dept: SCHEDULING | Facility: HOME HEALTH | Age: 64
End: 2019-05-22
Payer: COMMERCIAL

## 2020-06-19 ENCOUNTER — HOSPITAL ENCOUNTER (EMERGENCY)
Age: 65
Discharge: HOME | End: 2020-06-20
Attending: EMERGENCY MEDICINE | Admitting: EMERGENCY MEDICINE
Payer: MEDICARE

## 2020-06-19 VITALS
OXYGEN SATURATION: 97 % | DIASTOLIC BLOOD PRESSURE: 93 MMHG | TEMPERATURE: 98.3 F | SYSTOLIC BLOOD PRESSURE: 179 MMHG | BODY MASS INDEX: 35.42 KG/M2 | HEIGHT: 64 IN | WEIGHT: 207.5 LBS | RESPIRATION RATE: 18 BRPM | HEART RATE: 96 BPM

## 2020-06-19 DIAGNOSIS — M54.50 BILATERAL LOW BACK PAIN WITHOUT SCIATICA, UNSPECIFIED CHRONICITY: ICD-10-CM

## 2020-06-19 DIAGNOSIS — J44.1 ACUTE EXACERBATION OF CHRONIC OBSTRUCTIVE PULMONARY DISEASE (COPD) (HCC): Primary | ICD-10-CM

## 2020-06-19 PROCEDURE — 99284 EMERGENCY DEPT VISIT MOD MDM: CPT

## 2020-06-20 ENCOUNTER — APPOINTMENT (OUTPATIENT)
Dept: GENERAL RADIOLOGY | Age: 65
End: 2020-06-20
Attending: EMERGENCY MEDICINE
Payer: MEDICARE

## 2020-06-20 LAB
ANION GAP SERPL CALC-SCNC: 10 MMOL/L (ref 5–15)
BASOPHILS # BLD: 0.1 K/UL (ref 0–0.1)
BASOPHILS NFR BLD: 1 % (ref 0–1)
BUN SERPL-MCNC: 33 MG/DL (ref 6–20)
BUN/CREAT SERPL: 19 (ref 12–20)
CALCIUM SERPL-MCNC: 9.3 MG/DL (ref 8.5–10.1)
CHLORIDE SERPL-SCNC: 100 MMOL/L (ref 97–108)
CO2 SERPL-SCNC: 26 MMOL/L (ref 21–32)
COMMENT, HOLDF: NORMAL
CREAT SERPL-MCNC: 1.72 MG/DL (ref 0.55–1.02)
DIFFERENTIAL METHOD BLD: NORMAL
EOSINOPHIL # BLD: 0.3 K/UL (ref 0–0.4)
EOSINOPHIL NFR BLD: 4 % (ref 0–7)
ERYTHROCYTE [DISTWIDTH] IN BLOOD BY AUTOMATED COUNT: 13.6 % (ref 11.5–14.5)
GLUCOSE SERPL-MCNC: 397 MG/DL (ref 65–100)
HCT VFR BLD AUTO: 40.1 % (ref 35–47)
HGB BLD-MCNC: 12.9 G/DL (ref 11.5–16)
IMM GRANULOCYTES # BLD AUTO: 0 K/UL (ref 0–0.04)
IMM GRANULOCYTES NFR BLD AUTO: 0 % (ref 0–0.5)
LYMPHOCYTES # BLD: 3.1 K/UL (ref 0.8–3.5)
LYMPHOCYTES NFR BLD: 44 % (ref 12–49)
MCH RBC QN AUTO: 31.2 PG (ref 26–34)
MCHC RBC AUTO-ENTMCNC: 32.2 G/DL (ref 30–36.5)
MCV RBC AUTO: 97.1 FL (ref 80–99)
MONOCYTES # BLD: 0.5 K/UL (ref 0–1)
MONOCYTES NFR BLD: 8 % (ref 5–13)
NEUTS SEG # BLD: 3 K/UL (ref 1.8–8)
NEUTS SEG NFR BLD: 43 % (ref 32–75)
NRBC # BLD: 0 K/UL (ref 0–0.01)
NRBC BLD-RTO: 0 PER 100 WBC
PLATELET # BLD AUTO: 269 K/UL (ref 150–400)
PMV BLD AUTO: 9.8 FL (ref 8.9–12.9)
POTASSIUM SERPL-SCNC: 4.2 MMOL/L (ref 3.5–5.1)
RBC # BLD AUTO: 4.13 M/UL (ref 3.8–5.2)
SAMPLES BEING HELD,HOLD: NORMAL
SODIUM SERPL-SCNC: 136 MMOL/L (ref 136–145)
WBC # BLD AUTO: 6.9 K/UL (ref 3.6–11)

## 2020-06-20 PROCEDURE — 80048 BASIC METABOLIC PNL TOTAL CA: CPT

## 2020-06-20 PROCEDURE — 36415 COLL VENOUS BLD VENIPUNCTURE: CPT

## 2020-06-20 PROCEDURE — 71046 X-RAY EXAM CHEST 2 VIEWS: CPT

## 2020-06-20 PROCEDURE — 74011250637 HC RX REV CODE- 250/637: Performed by: EMERGENCY MEDICINE

## 2020-06-20 PROCEDURE — 93005 ELECTROCARDIOGRAM TRACING: CPT

## 2020-06-20 PROCEDURE — 85025 COMPLETE CBC W/AUTO DIFF WBC: CPT

## 2020-06-20 RX ORDER — CYCLOBENZAPRINE HCL 10 MG
10 TABLET ORAL
Qty: 15 TAB | Refills: 0 | Status: SHIPPED | OUTPATIENT
Start: 2020-06-20 | End: 2020-06-25

## 2020-06-20 RX ORDER — IBUPROFEN 600 MG/1
600 TABLET ORAL
Status: COMPLETED | OUTPATIENT
Start: 2020-06-20 | End: 2020-06-20

## 2020-06-20 RX ORDER — CYCLOBENZAPRINE HCL 10 MG
10 TABLET ORAL
Status: COMPLETED | OUTPATIENT
Start: 2020-06-20 | End: 2020-06-20

## 2020-06-20 RX ORDER — PREDNISONE 20 MG/1
40 TABLET ORAL DAILY
Qty: 10 TAB | Refills: 0 | Status: SHIPPED | OUTPATIENT
Start: 2020-06-20 | End: 2020-06-25

## 2020-06-20 RX ADMIN — IBUPROFEN 600 MG: 600 TABLET, FILM COATED ORAL at 00:52

## 2020-06-20 RX ADMIN — CYCLOBENZAPRINE 10 MG: 10 TABLET, FILM COATED ORAL at 00:52

## 2020-06-20 NOTE — ED TRIAGE NOTES
Patient arrives to the ED with c/o SOB and back pain x 2 weeks, patient states history of asthma and COPD, states no trauma to her back, no distress noted, states right flank pain with movement.

## 2020-06-20 NOTE — ED PROVIDER NOTES
58-year-old female who presents from home chief complaints of shortness of breath and back pain. Patient's had worsening symptoms for the past couple weeks. She states she is been using her home inhalers and nebulizer treatments with some improvement in her breathing but she continues to have a nonproductive cough that exacerbates her back pain. Her back pain started on the left side then migrated to the right side and now involves the entire back. Pain is worsened with cough and with movement. She denies any fever at home. No vomiting or diarrhea. She is been quarantined at home and has no known exposures to coronavirus. Her past medical history significant for CAD, CHF, COPD, diabetes, hypertension. Past Medical History:   Diagnosis Date    Asthma     CAD (coronary artery disease)     CHF (congestive heart failure) (HCC)     COPD (chronic obstructive pulmonary disease) (HCC)     Diabetes ([facility] [address]     Hypertension        Past Surgical History:   Procedure Laterality Date    CARDIAC SURG PROCEDURE UNLIST      DELIVERY       HX CORONARY STENT PLACEMENT      HX HYSTERECTOMY      VASCULAR SURGERY PROCEDURE UNLIST      leg stent         History reviewed. No pertinent family history.     Social History     Socioeconomic History    Marital status: SINGLE     Spouse name: Not on file    Number of children: Not on file    Years of education: Not on file    Highest education level: Not on file   Occupational History    Not on file   Social Needs    Financial resource strain: Not on file    Food insecurity     Worry: Not on file     Inability: Not on file    Transportation needs     Medical: Not on file     Non-medical: Not on file   Tobacco Use    Smoking status: Current Every Day Smoker     Packs/day: 0.25   Substance and Sexual Activity    Alcohol use: No    Drug use: Not on file    Sexual activity: Not on file   Lifestyle    Physical activity     Days per week: Not on file Minutes per session: Not on file    Stress: Not on file   Relationships    Social connections     Talks on phone: Not on file     Gets together: Not on file     Attends Jainism service: Not on file     Active member of club or organization: Not on file     Attends meetings of clubs or organizations: Not on file     Relationship status: Not on file    Intimate partner violence     Fear of current or ex partner: Not on file     Emotionally abused: Not on file     Physically abused: Not on file     Forced sexual activity: Not on file   Other Topics Concern    Not on file   Social History Narrative    Not on file         ALLERGIES: Ivp dye [fd and c blue no. 1]    Review of Systems   Constitutional: Negative for fever. HENT: Negative for facial swelling. Eyes: Negative for visual disturbance. Respiratory: Positive for shortness of breath. Negative for chest tightness. Cardiovascular: Negative for chest pain. Gastrointestinal: Negative for abdominal pain. Genitourinary: Negative for difficulty urinating and dysuria. Musculoskeletal: Negative for arthralgias. Skin: Negative for rash. Neurological: Negative for headaches. Hematological: Negative for adenopathy. Psychiatric/Behavioral: Negative for suicidal ideas. Vitals:    06/19/20 2350   BP: (!) 179/93   Pulse: 96   Resp: 18   Temp: 98.3 °F (36.8 °C)   SpO2: 97%   Weight: 94.1 kg (207 lb 8 oz)   Height: 5' 4\" (1.626 m)            Physical Exam  Vitals signs and nursing note reviewed. Constitutional:       General: She is not in acute distress. Appearance: She is well-developed. She is not diaphoretic. HENT:      Head: Normocephalic and atraumatic. Eyes:      General: No scleral icterus. Pupils: Pupils are equal, round, and reactive to light. Neck:      Musculoskeletal: Normal range of motion and neck supple. Thyroid: No thyromegaly. Cardiovascular:      Rate and Rhythm: Normal rate and regular rhythm.       Heart sounds: Normal heart sounds. No murmur. Pulmonary:      Effort: Pulmonary effort is normal. No respiratory distress. Breath sounds: Normal breath sounds. Abdominal:      General: Bowel sounds are normal. There is no distension. Palpations: Abdomen is soft. Tenderness: There is no abdominal tenderness. Musculoskeletal: Normal range of motion. Skin:     General: Skin is warm and dry. Findings: No rash. Neurological:      Mental Status: She is alert and oriented to person, place, and time. MDM  Number of Diagnoses or Management Options  Diagnosis management comments: Assessment: Patient resting comfortably with stable vital signs. Blood pressure elevated. She has no wheezing on exam or evidence of respiratory distress. EKG and chest x-ray were unremarkable. Her blood work is significant for an elevated glucose but no signs of DKA. Her creatinine is very close to her baseline. No other abnormalities. No significant evidence of infectious etiology or coronavirus. I suspect her back pain is most likely musculoskeletal probably due from coughing. I think she is stable for discharge home to treat her inflammatory lung disease with prednisone and continued nebulizer treatments. Return to the ED if she has any worsening symptoms or concerns. Amount and/or Complexity of Data Reviewed  Clinical lab tests: reviewed  Tests in the radiology section of CPT®: reviewed  Tests in the medicine section of CPT®: reviewed      ED Course as of Jun 20 0120   Sat Jun 20, 2020   0013 ED EKG interpretation:  Rhythm: normal sinus rhythm. Rate (approx.): 98. Axis: normal.  ST segment:  No concerning ST elevations or depressions. This EKG was interpreted by Alisia Garcia MD,ED Provider.        EKG 12 LEAD INITIAL [JM]      ED Course User Index  [JM] Melvi Flores MD       Procedures

## 2020-06-22 LAB
ATRIAL RATE: 98 BPM
CALCULATED P AXIS, ECG09: 55 DEGREES
CALCULATED R AXIS, ECG10: -49 DEGREES
CALCULATED T AXIS, ECG11: 83 DEGREES
DIAGNOSIS, 93000: NORMAL
P-R INTERVAL, ECG05: 160 MS
Q-T INTERVAL, ECG07: 390 MS
QRS DURATION, ECG06: 82 MS
QTC CALCULATION (BEZET), ECG08: 497 MS
VENTRICULAR RATE, ECG03: 98 BPM

## 2020-11-10 NOTE — PROGRESS NOTES
Hospitalist Progress Note  Gwen Klein MD  Answering service: [phone] OR [phone] from in house phone  Cell: [phone]      Date of Service:  7/3/2018  NAME:  Claudine Mi  :  1955  MRN:  [id number]      Admission Summary:     Ms. Richie Ronquillo is a 54-year-old woman with a past medical history significant for congestive heart failure, recent diagnosis of COPD, hypertension, type 2 diabetes, coronary artery disease, status post stent placement, pulmonary nodules, dyslipidemia and suspected obstructive sleep apnea, was in her usual state of health until a few days ago, when the patient developed shortness of breath. The shortness of breath is progressive, associated with a fever and cough. The cough is productive of yellowish sputum. The shortness of breath is with little or no exertion. The fever is not associated with rigors and chills. The patient was last admitted to this hospital from 03/15/2018 through 2018. She was admitted with shortness of breath. The shortness of breath was attributed to suspected COPD. The patient was treated. During that hospitalization, a CT scan of the chest was obtained. The CT scan shows pulmonary nodules. The patient has since had a followup by the pulmonologist, in which the COPD was confirmed with PFTs. Interval history / Subjective: This AM patient stating her breathing is still not at her baseline. She is not having much cough. Some wheezing and swelling.       Assessment & Plan:     Acute on chronic diastolic congestive heart failure:  -Continue IV Lasix for diuresis  -continue beta blocker and ACE   -ECHO 3/2018 with EF of 60%  -Cardology consulted  -appreciate further recs    COPD with acute exacerbation:   -Duo-nebs to scheduled  -continue Brovana and Pulmicort  -continue Levaquin    Elevated d-dimer: Patient with negative dopplers for DVT  -re-check creatinine and consider need for Left detailed vm to return call CTA vs. V/Q    Hypertension: Continue home medications. Type 2 diabetes with hyperglycemia. Will place the patient on sliding scale with insulin coverage. Will resume home basal insulin. Hypokalemia, replete as needed. Hypocalcemia, ionized level WNL. Anemia. This is due to chronic disease. Acute renal insuffiency, not failure, improving. Due to volume overload?  -monitor     Suspected obstructive sleep apnea. This was suspected during the last hospitalization. The patient has since been seen by Pulmonologist in the office. It is not clear whether the patient had a sleep study done. Coronary artery disease, status post stent placement. Pulmonary ground glass opacities seen on imaging. Interval increase.  -Pulmonary following   -no need to get outpatient scan again for now      Acute respiratory failure with hypoxia: This is due to multiple etiological factors, including congestive heart failure and chronic obstructive pulmonary disease. Dyslipidemia: Continue statin. Leg swelling: Dopplers negative. Code status: FULL  DVT prophylaxis: heparin    Care Plan discussed with: Patient/Family and Nurse  Disposition: Home w/Family and TBD     Hospital Problems  Date Reviewed: 7/2/2018          Codes Class Noted POA    * (Principal)CHF, acute on chronic ([facility] [address] ICD-10-CM: I50.9  ICD-9-CM: 428.0  7/2/2018 Yes              Review of Systems:   Pertinent items are noted in HPI. Vital Signs:    Last 24hrs VS reviewed since prior progress note.  Most recent are:  Visit Vitals    /71 (BP 1 Location: Right arm, BP Patient Position: At rest)    Pulse (!) 104    Temp 98.2 °F (36.8 °C)    Resp 20    Ht 5' 4\" (1.626 m)    Wt 95.6 kg (210 lb 12.2 oz)    SpO2 99%    BMI 36.18 kg/m2         Intake/Output Summary (Last 24 hours) at 07/03/18 0751  Last data filed at 07/02/18 1600   Gross per 24 hour   Intake              480 ml   Output              900 ml   Net             -420 ml Physical Examination:       Constitutional:  No acute distress, cooperative, pleasant    ENT:  Neck supple   Resp:  Coarse sounds and wheezing bilaterally   CV:  Regular rhythm, normal rate    GI:  Soft, non distended, non tender. Musculoskeletal:  1+ edema    Neurologic:  Moves all extremities. Non-focal        Data Review:    Review and/or order of clinical lab test  Review and/or order of tests in the radiology section of [facility]  Review and/or order of tests in the medicine section of [facility]    Labs:     Recent Labs      07/02/18 0427 07/02/18   0103   WBC  7.3  5.0   HGB  10.9*  10.7*   HCT  32.8*  32.6*   PLT  243  236     Recent Labs      07/02/18   0427 07/02/18   0103   NA  137  137   K  3.7  3.1*   CL  102  102   CO2  24  23   BUN  14  13   CREA  1.37*  1.51*   GLU  271*  307*   CA  7.5*  7.6*   MG  1.7   --    PHOS  3.7   --      Recent Labs      07/02/18 0427 07/02/18   0103   SGOT  39*  20   ALT  17  15   AP  128*  121*   TBILI  0.4  0.3   TP  6.7  6.9   ALB  2.3*  2.2*   GLOB  4.4*  4.7*     No results for input(s): INR, PTP, APTT in the last 72 hours. No lab exists for component: INREXT   Recent Labs      07/02/18 0427   TIBC  190*   PSAT  12*   FERR  612*      Lab Results   Component Value Date/Time    Folate 7.3 07/02/2018 04:27 AM      No results for input(s): PH, PCO2, PO2 in the last 72 hours.   Recent Labs      07/02/18   1740  07/02/18   1158  07/02/18   0427  07/02/18   0103   CPK   --   684*  588*   --    CKNDX   --   0.8  0.5   --    TROIQ  <0.05  <0.05   --   <0.05     Lab Results   Component Value Date/Time    Cholesterol, total 260 (H) 07/02/2018 04:27 AM    HDL Cholesterol 31 07/02/2018 04:27 AM    LDL, calculated 197.2 (H) 07/02/2018 04:27 AM    Triglyceride 159 (H) 07/02/2018 04:27 AM    CHOL/HDL Ratio 8.4 (H) 07/02/2018 04:27 AM     Lab Results   Component Value Date/Time    Glucose (POC) 286 (H) 07/03/2018 06:23 AM    Glucose (POC) 350 (H) 07/02/2018 10:09 PM    Glucose (POC) 387 (H) 07/02/2018 05:47 PM    Glucose (POC) 472 (H) 07/02/2018 11:22 AM    Glucose (POC) 384 (H) 07/02/2018 07:15 AM     Lab Results   Component Value Date/Time    Color YELLOW/STRAW 07/02/2018 12:05 PM    Appearance CLEAR 07/02/2018 12:05 PM    Specific gravity 1.011 07/02/2018 12:05 PM    Specific gravity 1.010 06/08/2011 12:00 AM    pH (UA) 5.0 07/02/2018 12:05 PM    Protein 300 (A) 07/02/2018 12:05 PM    Glucose >1000 (A) 07/02/2018 12:05 PM    Ketone NEGATIVE  07/02/2018 12:05 PM    Bilirubin NEGATIVE  07/02/2018 12:05 PM    Urobilinogen 0.2 07/02/2018 12:05 PM    Nitrites NEGATIVE  07/02/2018 12:05 PM    Leukocyte Esterase NEGATIVE  07/02/2018 12:05 PM    Epithelial cells FEW 07/02/2018 12:05 PM    Bacteria NEGATIVE  07/02/2018 12:05 PM    WBC 0-4 07/02/2018 12:05 PM    RBC 0-5 07/02/2018 12:05 PM         Medications Reviewed:     Current Facility-Administered Medications   Medication Dose Route Frequency    albuterol-ipratropium (DUO-NEB) 2.5 MG-0.5 MG/3 ML  3 mL Nebulization Q4H PRN    aspirin (ASPIRIN) tablet 325 mg  325 mg Oral DAILY    metoprolol tartrate (LOPRESSOR) tablet 100 mg  100 mg Oral DAILY    montelukast (SINGULAIR) tablet 10 mg  10 mg Oral QHS    sodium chloride (NS) flush 5-10 mL  5-10 mL IntraVENous Q8H    sodium chloride (NS) flush 5-10 mL  5-10 mL IntraVENous PRN    acetaminophen (TYLENOL) tablet 650 mg  650 mg Oral Q4H PRN    ondansetron (ZOFRAN) injection 4 mg  4 mg IntraVENous Q4H PRN    bisacodyl (DULCOLAX) tablet 5 mg  5 mg Oral DAILY PRN    heparin (porcine) injection 5,000 Units  5,000 Units SubCUTAneous Q8H    Saccharomyces boulardii (FLORASTOR) capsule 250 mg  250 mg Oral DAILY    levoFLOXacin (LEVAQUIN) 500 mg in D5W IVPB  500 mg IntraVENous Q24H    predniSONE (DELTASONE) tablet 40 mg  40 mg Oral DAILY WITH BREAKFAST    furosemide (LASIX) injection 40 mg  40 mg IntraVENous DAILY    insulin lispro (HUMALOG) injection   SubCUTAneous AC&HS    glucose chewable tablet 16 g  4 Tab Oral PRN    dextrose (D50W) injection syrg 12.5-25 g  12.5-25 g IntraVENous PRN    glucagon (GLUCAGEN) injection 1 mg  1 mg IntraMUSCular PRN    arformoterol (BROVANA) neb solution 15 mcg  15 mcg Nebulization BID RT    And    budesonide (PULMICORT) 500 mcg/2 ml nebulizer suspension  500 mcg Nebulization BID RT    insulin glargine (LANTUS) injection 60 Units  60 Units SubCUTAneous DAILY    rosuvastatin (CRESTOR) tablet 20 mg  20 mg Oral QHS    lisinopril (PRINIVIL, ZESTRIL) tablet 40 mg  40 mg Oral DAILY    insulin glargine (LANTUS) injection 50 Units  50 Units SubCUTAneous QHS     ______________________________________________________________________  EXPECTED LENGTH OF STAY: 4d 12h  ACTUAL LENGTH OF STAY:          1                 Dong Solorio MD

## 2021-12-02 ENCOUNTER — APPOINTMENT (OUTPATIENT)
Dept: CT IMAGING | Age: 66
DRG: 674 | End: 2021-12-02
Attending: EMERGENCY MEDICINE
Payer: MEDICARE

## 2021-12-02 ENCOUNTER — APPOINTMENT (OUTPATIENT)
Dept: GENERAL RADIOLOGY | Age: 66
DRG: 674 | End: 2021-12-02
Attending: EMERGENCY MEDICINE
Payer: MEDICARE

## 2021-12-02 ENCOUNTER — HOSPITAL ENCOUNTER (INPATIENT)
Age: 66
LOS: 8 days | Discharge: HOME | DRG: 674 | End: 2021-12-10
Attending: EMERGENCY MEDICINE | Admitting: HOSPITALIST
Payer: MEDICARE

## 2021-12-02 ENCOUNTER — APPOINTMENT (OUTPATIENT)
Dept: GENERAL RADIOLOGY | Age: 66
DRG: 674 | End: 2021-12-02
Attending: NURSE PRACTITIONER
Payer: MEDICARE

## 2021-12-02 DIAGNOSIS — R06.03 RESPIRATORY DISTRESS: ICD-10-CM

## 2021-12-02 DIAGNOSIS — D64.9 ANEMIA, UNSPECIFIED TYPE: ICD-10-CM

## 2021-12-02 DIAGNOSIS — R77.8 ELEVATED TROPONIN: ICD-10-CM

## 2021-12-02 DIAGNOSIS — N17.9 ACUTE RENAL FAILURE, UNSPECIFIED ACUTE RENAL FAILURE TYPE (HCC): Primary | ICD-10-CM

## 2021-12-02 LAB
ALBUMIN SERPL-MCNC: 1.8 G/DL (ref 3.5–5)
ALBUMIN/GLOB SERPL: 0.3 {RATIO} (ref 1.1–2.2)
ALP SERPL-CCNC: 99 U/L (ref 45–117)
ALT SERPL-CCNC: 18 U/L (ref 12–78)
ANION GAP SERPL CALC-SCNC: 10 MMOL/L (ref 5–15)
AST SERPL-CCNC: 20 U/L (ref 15–37)
BASOPHILS # BLD: 0.1 K/UL (ref 0–0.1)
BASOPHILS NFR BLD: 1 % (ref 0–1)
BILIRUB SERPL-MCNC: 0.3 MG/DL (ref 0.2–1)
BNP SERPL-MCNC: 6759 PG/ML
BUN SERPL-MCNC: 58 MG/DL (ref 6–20)
BUN/CREAT SERPL: 9 (ref 12–20)
CALCIUM SERPL-MCNC: 7.6 MG/DL (ref 8.5–10.1)
CHLORIDE SERPL-SCNC: 112 MMOL/L (ref 97–108)
CO2 SERPL-SCNC: 21 MMOL/L (ref 21–32)
COMMENT, HOLDF: NORMAL
COMMENT, HOLDF: NORMAL
CREAT SERPL-MCNC: 6.8 MG/DL (ref 0.55–1.02)
DIFFERENTIAL METHOD BLD: ABNORMAL
EOSINOPHIL # BLD: 0.5 K/UL (ref 0–0.4)
EOSINOPHIL NFR BLD: 5 % (ref 0–7)
ERYTHROCYTE [DISTWIDTH] IN BLOOD BY AUTOMATED COUNT: 15.8 % (ref 11.5–14.5)
GLOBULIN SER CALC-MCNC: 5.2 G/DL (ref 2–4)
GLUCOSE SERPL-MCNC: 144 MG/DL (ref 65–100)
HCT VFR BLD AUTO: 19.7 % (ref 35–47)
HEMOCCULT STL QL: NEGATIVE
HGB BLD-MCNC: 6 G/DL (ref 11.5–16)
HISTORY CHECKED?,CKHIST: NORMAL
IMM GRANULOCYTES # BLD AUTO: 0.1 K/UL (ref 0–0.04)
IMM GRANULOCYTES NFR BLD AUTO: 1 % (ref 0–0.5)
LYMPHOCYTES # BLD: 2.9 K/UL (ref 0.8–3.5)
LYMPHOCYTES NFR BLD: 30 % (ref 12–49)
MCH RBC QN AUTO: 30.3 PG (ref 26–34)
MCHC RBC AUTO-ENTMCNC: 30.5 G/DL (ref 30–36.5)
MCV RBC AUTO: 99.5 FL (ref 80–99)
MONOCYTES # BLD: 0.7 K/UL (ref 0–1)
MONOCYTES NFR BLD: 7 % (ref 5–13)
NEUTS SEG # BLD: 5.5 K/UL (ref 1.8–8)
NEUTS SEG NFR BLD: 56 % (ref 32–75)
NRBC # BLD: 0 K/UL (ref 0–0.01)
NRBC BLD-RTO: 0 PER 100 WBC
PLATELET # BLD AUTO: 319 K/UL (ref 150–400)
PMV BLD AUTO: 9.5 FL (ref 8.9–12.9)
POTASSIUM SERPL-SCNC: 4.3 MMOL/L (ref 3.5–5.1)
PROT SERPL-MCNC: 7 G/DL (ref 6.4–8.2)
RBC # BLD AUTO: 1.98 M/UL (ref 3.8–5.2)
RBC MORPH BLD: ABNORMAL
RBC MORPH BLD: ABNORMAL
RETICS # AUTO: 0.06 M/UL (ref 0.02–0.08)
RETICS/RBC NFR AUTO: 3.2 % (ref 0.7–2.1)
SAMPLES BEING HELD,HOLD: NORMAL
SAMPLES BEING HELD,HOLD: NORMAL
SODIUM SERPL-SCNC: 143 MMOL/L (ref 136–145)
TROPONIN-HIGH SENSITIVITY: 931 NG/L (ref 0–51)
WBC # BLD AUTO: 9.8 K/UL (ref 3.6–11)

## 2021-12-02 PROCEDURE — 86901 BLOOD TYPING SEROLOGIC RH(D): CPT

## 2021-12-02 PROCEDURE — P9016 RBC LEUKOCYTES REDUCED: HCPCS

## 2021-12-02 PROCEDURE — 65660000001 HC RM ICU INTERMED STEPDOWN

## 2021-12-02 PROCEDURE — 83010 ASSAY OF HAPTOGLOBIN QUANT: CPT

## 2021-12-02 PROCEDURE — 81001 URINALYSIS AUTO W/SCOPE: CPT

## 2021-12-02 PROCEDURE — 84484 ASSAY OF TROPONIN QUANT: CPT

## 2021-12-02 PROCEDURE — 36430 TRANSFUSION BLD/BLD COMPNT: CPT

## 2021-12-02 PROCEDURE — 86923 COMPATIBILITY TEST ELECTRIC: CPT

## 2021-12-02 PROCEDURE — 30233N1 TRANSFUSION OF NONAUTOLOGOUS RED BLOOD CELLS INTO PERIPHERAL VEIN, PERCUTANEOUS APPROACH: ICD-10-PCS | Performed by: INTERNAL MEDICINE

## 2021-12-02 PROCEDURE — 71046 X-RAY EXAM CHEST 2 VIEWS: CPT

## 2021-12-02 PROCEDURE — 93005 ELECTROCARDIOGRAM TRACING: CPT

## 2021-12-02 PROCEDURE — 94660 CPAP INITIATION&MGMT: CPT

## 2021-12-02 PROCEDURE — 36415 COLL VENOUS BLD VENIPUNCTURE: CPT

## 2021-12-02 PROCEDURE — 99283 EMERGENCY DEPT VISIT LOW MDM: CPT

## 2021-12-02 PROCEDURE — 82607 VITAMIN B-12: CPT

## 2021-12-02 PROCEDURE — 82570 ASSAY OF URINE CREATININE: CPT

## 2021-12-02 PROCEDURE — 82728 ASSAY OF FERRITIN: CPT

## 2021-12-02 PROCEDURE — 71045 X-RAY EXAM CHEST 1 VIEW: CPT

## 2021-12-02 PROCEDURE — 82746 ASSAY OF FOLIC ACID SERUM: CPT

## 2021-12-02 PROCEDURE — 82043 UR ALBUMIN QUANTITATIVE: CPT

## 2021-12-02 PROCEDURE — 74176 CT ABD & PELVIS W/O CONTRAST: CPT

## 2021-12-02 PROCEDURE — 86920 COMPATIBILITY TEST SPIN: CPT

## 2021-12-02 PROCEDURE — 74011250637 HC RX REV CODE- 250/637: Performed by: EMERGENCY MEDICINE

## 2021-12-02 PROCEDURE — 83550 IRON BINDING TEST: CPT

## 2021-12-02 PROCEDURE — 82272 OCCULT BLD FECES 1-3 TESTS: CPT

## 2021-12-02 PROCEDURE — 84300 ASSAY OF URINE SODIUM: CPT

## 2021-12-02 PROCEDURE — 83880 ASSAY OF NATRIURETIC PEPTIDE: CPT

## 2021-12-02 PROCEDURE — 5A09457 ASSISTANCE WITH RESPIRATORY VENTILATION, 24-96 CONSECUTIVE HOURS, CONTINUOUS POSITIVE AIRWAY PRESSURE: ICD-10-PCS | Performed by: INTERNAL MEDICINE

## 2021-12-02 PROCEDURE — 85025 COMPLETE CBC W/AUTO DIFF WBC: CPT

## 2021-12-02 PROCEDURE — 85045 AUTOMATED RETICULOCYTE COUNT: CPT

## 2021-12-02 PROCEDURE — 80053 COMPREHEN METABOLIC PANEL: CPT

## 2021-12-02 RX ORDER — ONDANSETRON 4 MG/1
4 TABLET, ORALLY DISINTEGRATING ORAL
Status: DISCONTINUED | OUTPATIENT
Start: 2021-12-02 | End: 2021-12-10 | Stop reason: HOSPADM

## 2021-12-02 RX ORDER — ACETAMINOPHEN 325 MG/1
650 TABLET ORAL
Status: DISCONTINUED | OUTPATIENT
Start: 2021-12-02 | End: 2021-12-10 | Stop reason: HOSPADM

## 2021-12-02 RX ORDER — NIFEDIPINE 60 MG/1
60 TABLET, EXTENDED RELEASE ORAL DAILY
Status: DISCONTINUED | OUTPATIENT
Start: 2021-12-03 | End: 2021-12-10 | Stop reason: HOSPADM

## 2021-12-02 RX ORDER — FUROSEMIDE 10 MG/ML
80 INJECTION INTRAMUSCULAR; INTRAVENOUS ONCE
Status: COMPLETED | OUTPATIENT
Start: 2021-12-02 | End: 2021-12-03

## 2021-12-02 RX ORDER — ROSUVASTATIN CALCIUM 10 MG/1
20 TABLET, COATED ORAL
Status: DISCONTINUED | OUTPATIENT
Start: 2021-12-02 | End: 2021-12-03

## 2021-12-02 RX ORDER — MAGNESIUM SULFATE 100 %
4 CRYSTALS MISCELLANEOUS AS NEEDED
Status: DISCONTINUED | OUTPATIENT
Start: 2021-12-02 | End: 2021-12-10 | Stop reason: HOSPADM

## 2021-12-02 RX ORDER — POLYETHYLENE GLYCOL 3350 17 G/17G
17 POWDER, FOR SOLUTION ORAL DAILY PRN
Status: DISCONTINUED | OUTPATIENT
Start: 2021-12-02 | End: 2021-12-10 | Stop reason: HOSPADM

## 2021-12-02 RX ORDER — ONDANSETRON 2 MG/ML
4 INJECTION INTRAMUSCULAR; INTRAVENOUS
Status: DISCONTINUED | OUTPATIENT
Start: 2021-12-02 | End: 2021-12-10 | Stop reason: HOSPADM

## 2021-12-02 RX ORDER — MONTELUKAST SODIUM 10 MG/1
10 TABLET ORAL
Status: DISCONTINUED | OUTPATIENT
Start: 2021-12-02 | End: 2021-12-10 | Stop reason: HOSPADM

## 2021-12-02 RX ORDER — ASPIRIN 325 MG
325 TABLET ORAL
Status: COMPLETED | OUTPATIENT
Start: 2021-12-02 | End: 2021-12-02

## 2021-12-02 RX ORDER — SODIUM CHLORIDE 0.9 % (FLUSH) 0.9 %
5-40 SYRINGE (ML) INJECTION EVERY 8 HOURS
Status: DISCONTINUED | OUTPATIENT
Start: 2021-12-02 | End: 2021-12-10 | Stop reason: HOSPADM

## 2021-12-02 RX ORDER — DEXTROSE 50 % IN WATER (D50W) INTRAVENOUS SYRINGE
12.5-25 AS NEEDED
Status: DISCONTINUED | OUTPATIENT
Start: 2021-12-02 | End: 2021-12-10 | Stop reason: HOSPADM

## 2021-12-02 RX ORDER — INSULIN LISPRO 100 [IU]/ML
INJECTION, SOLUTION INTRAVENOUS; SUBCUTANEOUS
Status: DISCONTINUED | OUTPATIENT
Start: 2021-12-03 | End: 2021-12-10 | Stop reason: HOSPADM

## 2021-12-02 RX ORDER — SODIUM CHLORIDE 0.9 % (FLUSH) 0.9 %
5-40 SYRINGE (ML) INJECTION AS NEEDED
Status: DISCONTINUED | OUTPATIENT
Start: 2021-12-02 | End: 2021-12-10 | Stop reason: HOSPADM

## 2021-12-02 RX ORDER — ACETAMINOPHEN 650 MG/1
650 SUPPOSITORY RECTAL
Status: DISCONTINUED | OUTPATIENT
Start: 2021-12-02 | End: 2021-12-10 | Stop reason: HOSPADM

## 2021-12-02 RX ORDER — METOPROLOL SUCCINATE 50 MG/1
100 TABLET, EXTENDED RELEASE ORAL DAILY
Status: DISCONTINUED | OUTPATIENT
Start: 2021-12-03 | End: 2021-12-10 | Stop reason: HOSPADM

## 2021-12-02 RX ORDER — IPRATROPIUM BROMIDE AND ALBUTEROL SULFATE 2.5; .5 MG/3ML; MG/3ML
3 SOLUTION RESPIRATORY (INHALATION)
Status: DISCONTINUED | OUTPATIENT
Start: 2021-12-02 | End: 2021-12-10 | Stop reason: HOSPADM

## 2021-12-02 RX ORDER — SODIUM CHLORIDE 9 MG/ML
250 INJECTION, SOLUTION INTRAVENOUS AS NEEDED
Status: DISCONTINUED | OUTPATIENT
Start: 2021-12-02 | End: 2021-12-10 | Stop reason: HOSPADM

## 2021-12-02 RX ORDER — ASPIRIN 325 MG
325 TABLET ORAL DAILY
Status: DISCONTINUED | OUTPATIENT
Start: 2021-12-03 | End: 2021-12-10 | Stop reason: HOSPADM

## 2021-12-02 RX ADMIN — ASPIRIN 325 MG ORAL TABLET 325 MG: 325 PILL ORAL at 22:42

## 2021-12-03 ENCOUNTER — APPOINTMENT (OUTPATIENT)
Dept: INTERVENTIONAL RADIOLOGY/VASCULAR | Age: 66
DRG: 674 | End: 2021-12-03
Attending: INTERNAL MEDICINE
Payer: MEDICARE

## 2021-12-03 ENCOUNTER — APPOINTMENT (OUTPATIENT)
Dept: GENERAL RADIOLOGY | Age: 66
DRG: 674 | End: 2021-12-03
Attending: NURSE PRACTITIONER
Payer: MEDICARE

## 2021-12-03 ENCOUNTER — APPOINTMENT (OUTPATIENT)
Dept: VASCULAR SURGERY | Age: 66
DRG: 674 | End: 2021-12-03
Attending: HOSPITALIST
Payer: MEDICARE

## 2021-12-03 LAB
ANION GAP SERPL CALC-SCNC: 9 MMOL/L (ref 5–15)
APPEARANCE UR: CLEAR
ATRIAL RATE: 97 BPM
BACTERIA URNS QL MICRO: NEGATIVE /HPF
BILIRUB UR QL: NEGATIVE
BUN SERPL-MCNC: 60 MG/DL (ref 6–20)
BUN/CREAT SERPL: 9 (ref 12–20)
CALCIUM SERPL-MCNC: 7.4 MG/DL (ref 8.5–10.1)
CALCULATED P AXIS, ECG09: 63 DEGREES
CALCULATED R AXIS, ECG10: 13 DEGREES
CALCULATED T AXIS, ECG11: 65 DEGREES
CHLORIDE SERPL-SCNC: 112 MMOL/L (ref 97–108)
CO2 SERPL-SCNC: 21 MMOL/L (ref 21–32)
COLOR UR: ABNORMAL
COMMENT, HOLDF: NORMAL
COMMENT, HOLDF: NORMAL
CREAT SERPL-MCNC: 6.63 MG/DL (ref 0.55–1.02)
CREAT UR-MCNC: 119 MG/DL
CREAT UR-MCNC: 122 MG/DL
CREAT UR-MCNC: 79.6 MG/DL
DIAGNOSIS, 93000: NORMAL
EPITH CASTS URNS QL MICRO: ABNORMAL /LPF
ERYTHROCYTE [DISTWIDTH] IN BLOOD BY AUTOMATED COUNT: 15.4 % (ref 11.5–14.5)
FERRITIN SERPL-MCNC: 586 NG/ML (ref 8–252)
FOLATE SERPL-MCNC: 18.1 NG/ML (ref 5–21)
GLUCOSE BLD STRIP.AUTO-MCNC: 105 MG/DL (ref 65–117)
GLUCOSE BLD STRIP.AUTO-MCNC: 120 MG/DL (ref 65–117)
GLUCOSE BLD STRIP.AUTO-MCNC: 92 MG/DL (ref 65–117)
GLUCOSE SERPL-MCNC: 65 MG/DL (ref 65–100)
GLUCOSE UR STRIP.AUTO-MCNC: 500 MG/DL
HAPTOGLOB SERPL-MCNC: 321 MG/DL (ref 30–200)
HBV SURFACE AB SER QL: NONREACTIVE
HBV SURFACE AB SER-ACNC: <3.1 MIU/ML
HBV SURFACE AG SER QL: <0.1 INDEX
HBV SURFACE AG SER QL: NEGATIVE
HCT VFR BLD AUTO: 20.1 % (ref 35–47)
HCT VFR BLD AUTO: 27.6 % (ref 35–47)
HGB BLD-MCNC: 6.4 G/DL (ref 11.5–16)
HGB BLD-MCNC: 8.6 G/DL (ref 11.5–16)
HGB UR QL STRIP: ABNORMAL
HISTORY CHECKED?,CKHIST: NORMAL
HISTORY CHECKED?,CKHIST: NORMAL
IRON SATN MFR SERPL: 15 % (ref 20–50)
IRON SATN MFR SERPL: 17 % (ref 20–50)
IRON SERPL-MCNC: 34 UG/DL (ref 35–150)
IRON SERPL-MCNC: 36 UG/DL (ref 35–150)
KETONES UR QL STRIP.AUTO: NEGATIVE MG/DL
LEUKOCYTE ESTERASE UR QL STRIP.AUTO: NEGATIVE
MAGNESIUM SERPL-MCNC: 2 MG/DL (ref 1.6–2.4)
MCH RBC QN AUTO: 30.8 PG (ref 26–34)
MCHC RBC AUTO-ENTMCNC: 31.8 G/DL (ref 30–36.5)
MCV RBC AUTO: 96.6 FL (ref 80–99)
MICROALBUMIN UR-MCNC: 862 MG/DL
MICROALBUMIN/CREAT UR-RTO: 7244 MG/G (ref 0–30)
NITRITE UR QL STRIP.AUTO: NEGATIVE
NRBC # BLD: 0 K/UL (ref 0–0.01)
NRBC BLD-RTO: 0 PER 100 WBC
P-R INTERVAL, ECG05: 156 MS
PH UR STRIP: 5.5 [PH] (ref 5–8)
PLATELET # BLD AUTO: 301 K/UL (ref 150–400)
PMV BLD AUTO: 9.5 FL (ref 8.9–12.9)
POTASSIUM SERPL-SCNC: 3.8 MMOL/L (ref 3.5–5.1)
PROT UR STRIP-MCNC: 300 MG/DL
PROT UR-MCNC: 966 MG/DL (ref 0–11.9)
PROT/CREAT UR-RTO: 12.1
Q-T INTERVAL, ECG07: 394 MS
QRS DURATION, ECG06: 82 MS
QTC CALCULATION (BEZET), ECG08: 500 MS
RBC # BLD AUTO: 2.08 M/UL (ref 3.8–5.2)
RBC #/AREA URNS HPF: ABNORMAL /HPF (ref 0–5)
SAMPLES BEING HELD,HOLD: NORMAL
SAMPLES BEING HELD,HOLD: NORMAL
SERVICE CMNT-IMP: ABNORMAL
SERVICE CMNT-IMP: NORMAL
SERVICE CMNT-IMP: NORMAL
SODIUM SERPL-SCNC: 142 MMOL/L (ref 136–145)
SODIUM UR-SCNC: 44 MMOL/L
SP GR UR REFRACTOMETRY: 1.02 (ref 1–1.03)
TIBC SERPL-MCNC: 210 UG/DL (ref 250–450)
TIBC SERPL-MCNC: 224 UG/DL (ref 250–450)
TROPONIN-HIGH SENSITIVITY: 1039 NG/L (ref 0–51)
TROPONIN-HIGH SENSITIVITY: 1251 NG/L (ref 0–51)
UROBILINOGEN UR QL STRIP.AUTO: 1 EU/DL (ref 0.2–1)
VENTRICULAR RATE, ECG03: 97 BPM
VIT B12 SERPL-MCNC: 589 PG/ML (ref 193–986)
WBC # BLD AUTO: 8.5 K/UL (ref 3.6–11)
WBC URNS QL MICRO: ABNORMAL /HPF (ref 0–4)

## 2021-12-03 PROCEDURE — 77030002996 HC SUT SLK J&J -A

## 2021-12-03 PROCEDURE — C1752 CATH,HEMODIALYSIS,SHORT-TERM: HCPCS

## 2021-12-03 PROCEDURE — 82962 GLUCOSE BLOOD TEST: CPT

## 2021-12-03 PROCEDURE — 74011250636 HC RX REV CODE- 250/636: Performed by: NURSE PRACTITIONER

## 2021-12-03 PROCEDURE — 87340 HEPATITIS B SURFACE AG IA: CPT

## 2021-12-03 PROCEDURE — 83540 ASSAY OF IRON: CPT

## 2021-12-03 PROCEDURE — 74011250637 HC RX REV CODE- 250/637: Performed by: HOSPITALIST

## 2021-12-03 PROCEDURE — C1894 INTRO/SHEATH, NON-LASER: HCPCS

## 2021-12-03 PROCEDURE — 94664 DEMO&/EVAL PT USE INHALER: CPT

## 2021-12-03 PROCEDURE — 83735 ASSAY OF MAGNESIUM: CPT

## 2021-12-03 PROCEDURE — 85018 HEMOGLOBIN: CPT

## 2021-12-03 PROCEDURE — 86706 HEP B SURFACE ANTIBODY: CPT

## 2021-12-03 PROCEDURE — 02HV33Z INSERTION OF INFUSION DEVICE INTO SUPERIOR VENA CAVA, PERCUTANEOUS APPROACH: ICD-10-PCS | Performed by: STUDENT IN AN ORGANIZED HEALTH CARE EDUCATION/TRAINING PROGRAM

## 2021-12-03 PROCEDURE — 74011250637 HC RX REV CODE- 250/637: Performed by: FAMILY MEDICINE

## 2021-12-03 PROCEDURE — 74011250636 HC RX REV CODE- 250/636: Performed by: INTERNAL MEDICINE

## 2021-12-03 PROCEDURE — 71045 X-RAY EXAM CHEST 1 VIEW: CPT

## 2021-12-03 PROCEDURE — 77030010507 HC ADH SKN DERMBND J&J -B

## 2021-12-03 PROCEDURE — 2709999900 HC NON-CHARGEABLE SUPPLY

## 2021-12-03 PROCEDURE — 77010033678 HC OXYGEN DAILY

## 2021-12-03 PROCEDURE — 85027 COMPLETE CBC AUTOMATED: CPT

## 2021-12-03 PROCEDURE — 94660 CPAP INITIATION&MGMT: CPT

## 2021-12-03 PROCEDURE — 74011000250 HC RX REV CODE- 250: Performed by: HOSPITALIST

## 2021-12-03 PROCEDURE — 76937 US GUIDE VASCULAR ACCESS: CPT

## 2021-12-03 PROCEDURE — 94640 AIRWAY INHALATION TREATMENT: CPT

## 2021-12-03 PROCEDURE — 90935 HEMODIALYSIS ONE EVALUATION: CPT

## 2021-12-03 PROCEDURE — 74011000250 HC RX REV CODE- 250: Performed by: NURSE PRACTITIONER

## 2021-12-03 PROCEDURE — 36415 COLL VENOUS BLD VENIPUNCTURE: CPT

## 2021-12-03 PROCEDURE — C1769 GUIDE WIRE: HCPCS

## 2021-12-03 PROCEDURE — 65660000001 HC RM ICU INTERMED STEPDOWN

## 2021-12-03 PROCEDURE — 36430 TRANSFUSION BLD/BLD COMPNT: CPT

## 2021-12-03 PROCEDURE — 93971 EXTREMITY STUDY: CPT

## 2021-12-03 PROCEDURE — P9016 RBC LEUKOCYTES REDUCED: HCPCS

## 2021-12-03 PROCEDURE — 86038 ANTINUCLEAR ANTIBODIES: CPT

## 2021-12-03 PROCEDURE — 84155 ASSAY OF PROTEIN SERUM: CPT

## 2021-12-03 PROCEDURE — 84156 ASSAY OF PROTEIN URINE: CPT

## 2021-12-03 PROCEDURE — 80048 BASIC METABOLIC PNL TOTAL CA: CPT

## 2021-12-03 PROCEDURE — 84484 ASSAY OF TROPONIN QUANT: CPT

## 2021-12-03 RX ORDER — MORPHINE SULFATE 2 MG/ML
2 INJECTION, SOLUTION INTRAMUSCULAR; INTRAVENOUS
Status: DISCONTINUED | OUTPATIENT
Start: 2021-12-03 | End: 2021-12-10 | Stop reason: HOSPADM

## 2021-12-03 RX ORDER — SODIUM CHLORIDE 9 MG/ML
250 INJECTION, SOLUTION INTRAVENOUS AS NEEDED
Status: DISCONTINUED | OUTPATIENT
Start: 2021-12-03 | End: 2021-12-10 | Stop reason: HOSPADM

## 2021-12-03 RX ORDER — HEPARIN SODIUM 1000 [USP'U]/ML
1100 INJECTION, SOLUTION INTRAVENOUS; SUBCUTANEOUS
Status: DISCONTINUED | OUTPATIENT
Start: 2021-12-03 | End: 2021-12-10 | Stop reason: HOSPADM

## 2021-12-03 RX ORDER — ROSUVASTATIN CALCIUM 10 MG/1
5 TABLET, COATED ORAL
Status: DISCONTINUED | OUTPATIENT
Start: 2021-12-03 | End: 2021-12-04

## 2021-12-03 RX ORDER — OXYCODONE AND ACETAMINOPHEN 5; 325 MG/1; MG/1
1 TABLET ORAL
Status: DISCONTINUED | OUTPATIENT
Start: 2021-12-03 | End: 2021-12-10 | Stop reason: HOSPADM

## 2021-12-03 RX ORDER — HEPARIN SODIUM 1000 [USP'U]/ML
1400 INJECTION, SOLUTION INTRAVENOUS; SUBCUTANEOUS
Status: DISCONTINUED | OUTPATIENT
Start: 2021-12-03 | End: 2021-12-10 | Stop reason: HOSPADM

## 2021-12-03 RX ORDER — HEPARIN SODIUM 1000 [USP'U]/ML
INJECTION, SOLUTION INTRAVENOUS; SUBCUTANEOUS
Status: DISPENSED
Start: 2021-12-03 | End: 2021-12-03

## 2021-12-03 RX ORDER — LIDOCAINE HYDROCHLORIDE 20 MG/ML
20 INJECTION, SOLUTION INFILTRATION; PERINEURAL
Status: COMPLETED | OUTPATIENT
Start: 2021-12-03 | End: 2021-12-03

## 2021-12-03 RX ORDER — HEPARIN SODIUM 1000 [USP'U]/ML
10000 INJECTION, SOLUTION INTRAVENOUS; SUBCUTANEOUS
Status: COMPLETED | OUTPATIENT
Start: 2021-12-03 | End: 2021-12-03

## 2021-12-03 RX ADMIN — FUROSEMIDE 80 MG: 10 INJECTION, SOLUTION INTRAVENOUS at 00:07

## 2021-12-03 RX ADMIN — ACETAMINOPHEN 650 MG: 325 TABLET ORAL at 15:19

## 2021-12-03 RX ADMIN — OXYCODONE AND ACETAMINOPHEN 1 TABLET: 5; 325 TABLET ORAL at 23:27

## 2021-12-03 RX ADMIN — ROSUVASTATIN 5 MG: 10 TABLET, FILM COATED ORAL at 22:31

## 2021-12-03 RX ADMIN — HEPARIN SODIUM 1400 UNITS: 1000 INJECTION INTRAVENOUS; SUBCUTANEOUS at 21:47

## 2021-12-03 RX ADMIN — HEPARIN SODIUM 1100 UNITS: 1000 INJECTION, SOLUTION INTRAVENOUS; SUBCUTANEOUS at 21:46

## 2021-12-03 RX ADMIN — OXYCODONE AND ACETAMINOPHEN 1 TABLET: 5; 325 TABLET ORAL at 18:10

## 2021-12-03 RX ADMIN — MONTELUKAST 10 MG: 10 TABLET, FILM COATED ORAL at 22:31

## 2021-12-03 RX ADMIN — METOPROLOL SUCCINATE 100 MG: 50 TABLET, EXTENDED RELEASE ORAL at 15:10

## 2021-12-03 RX ADMIN — ARFORMOTEROL TARTRATE: 15 SOLUTION RESPIRATORY (INHALATION) at 20:32

## 2021-12-03 RX ADMIN — NIFEDIPINE 60 MG: 30 TABLET, FILM COATED, EXTENDED RELEASE ORAL at 15:10

## 2021-12-03 RX ADMIN — HEPARIN SODIUM 2500 UNITS: 1000 INJECTION, SOLUTION INTRAVENOUS; SUBCUTANEOUS at 11:42

## 2021-12-03 RX ADMIN — ASPIRIN 325 MG ORAL TABLET 325 MG: 325 PILL ORAL at 15:10

## 2021-12-03 RX ADMIN — LIDOCAINE HYDROCHLORIDE 200 MG: 20 INJECTION, SOLUTION INFILTRATION; PERINEURAL at 11:41

## 2021-12-03 NOTE — ED PROVIDER NOTES
Ms. Ab Srivastava is a 69yo female who presents to the ER with complaints of shortness of breath. She states that her symptoms have been present for the last week. She feels somewhat short of breath when she is sitting still but feels significantly short of breath with exertion. She reports mild headache occasionally. She states that she is not having a headache at all while in the ER. She occasionally has some right flank pain, however she denies any flank pain currently. She denies any blood in her stool or black stool. He said that she does see a nephrologist at [facility]. She is unsure what her baseline creatinine is. She thinks that the GFR is 28 but she is not exactly sure. She states that her nephrologist has never mentioned dialysis to her. However, she does report that I have mentioned transplantation. She denies any changes with her urine. She denies any other complaints. Past Medical History:   Diagnosis Date    Asthma     CAD (coronary artery disease)     [facility]est[facility]) [facility]     COPD [facility]chronic obstructive pulmonary disease) [facility]     Diabetes ([facility]     Hypertension        Past Surgical History:   Procedure Laterality Date    DELIVERY       HX CORONARY STENT PLACEMENT      HX HYSTERECTOMY      [place] CARDIAC SURG PROCEDURE UNLIST      VASCULAR SURGERY PROCEDURE UNLIST      leg stent         No family history on file.     Social History     Socioeconomic History    Marital status: SINGLE     Spouse name: Not on file    Number of children: Not on file    Years of education: Not on file    Highest education level: Not on file   Occupational History    Not on file   Tobacco Use    Smoking status: Former Smoker     Packs/day: 0.25     Quit date: 2021     Years since quittin.0    Smokeless tobacco: Not on file   Substance and Sexual Activity    Alcohol use: No    Drug use: Not on file    Sexual activity: Not on file   Other Topics Concern    Not on file   Social History Narrative    Not on file     Social Determinants of Health     Financial Resource Strain:     Difficulty of Paying Living Expenses: Not on file   Food Insecurity:     Worried About Running Out of Food in the Last Year: Not on file    Dieter of Food in the Last Year: Not on file   Transportation Needs:     Lack of Transportation (Medical): Not on file    Lack of Transportation (Non-Medical): Not on file   Physical Activity:     Days of Exercise per Week: Not on file    Minutes of Exercise per Session: Not on file   Stress:     Feeling of Stress : Not on file   Social Connections:     Frequency of Communication with Friends and Family: Not on file    Frequency of Social Gatherings with Friends and Family: Not on file    Attends Restorationism Services: Not on file    Active Member of [address] [place] [address] [employer] or Organizations: Not on file    Attends Club or Organization Meetings: Not on file    Marital Status: Not on file   Intimate Partner Violence:     Fear of Current or Ex-Partner: Not on file    Emotionally Abused: Not on file    Physically Abused: Not on file    Sexually Abused: Not on file   Housing Stability:     Unable to Pay for Housing in the Last Year: Not on file    Number of [place] in the Last Year: Not on file    Unstable Housing in the Last Year: Not on file         ALLERGIES: Ivp dye [fd and c blue no. 1]    Review of Systems   Constitutional: Negative for chills and fever. HENT: Negative for rhinorrhea and sore throat. Respiratory: Positive for shortness of breath. Negative for cough. Cardiovascular: Negative for chest pain. Gastrointestinal: Negative for abdominal pain, diarrhea, nausea and vomiting. Genitourinary: Positive for flank pain. Negative for dysuria and urgency. Musculoskeletal: Negative for arthralgias and back pain. Skin: Negative for rash. Neurological: Negative for dizziness, weakness and light-headedness.        Vitals:    12/02/21 1940   BP: 118/67   Pulse: 97   Resp: 18   Temp: 97.6 °F (36.4 °C)   SpO2: 96%            Physical Exam     Vital signs reviewed. Nursing notes reviewed. Const:  No acute distress, well developed, well nourished  Head:  Atraumatic, normocephalic  Eyes:  PERRL, conjunctiva normal, no scleral icterus  Neck:  Supple, trachea midline  Cardiovascular: Regular rate  Resp: Moderate resp distress, increased work of breathing  Abd:  Soft, non-tender, non-distended, no rebound, no guarding  MSK:  No pedal edema, normal ROM  Neuro:  Alert and oriented x3, no cranial nerve defect  Skin:  Warm, dry, intact  Psych: normal mood and affect, behavior is normal, judgement and thought content is normal          MDM  Number of Diagnoses or Management Options     Amount and/or Complexity of Data Reviewed  Clinical lab tests: ordered and reviewed  Tests in the radiology section of CPT®: ordered and reviewed  Review and summarize past medical records: yes    Patient Progress  Patient progress: stable         Procedures      Perfect Serve Consult for Admission  10:30 PM    ED Room Number: ER21/21  Patient Name and age:  Richar Sands 78 y.o.  female  Working Diagnosis:   1. Acute renal failure, unspecified acute renal failure type ([address]    2. Respiratory distress    3. Anemia, unspecified type        COVID-19 Suspicion:  no  Sepsis present:  no  Reassessment needed: no  Code Status:  Full Code  Readmission: no  Isolation Requirements:  no  Recommended Level of Care:  step down  Department:[facility] Adult ED - 21            Total critical care time spent exclusive of procedures:  35 min. Ms. Stone Powell is a 69yo female who presents to the ER with complaints of SOB. Pt. Found to have edema on xray. She has chronic kidney disease. From the \"care anywhere\" button on connect care, it can see labs from [facility]. On 9/29/21, her creatinine was 3.66. I have ordered her BiPAP because of her edema. I have a consult in for nephrology. Pt.  To be evaluated for admission by the hospitalist.

## 2021-12-03 NOTE — CONSULTS
NEPHROLOGY CONSULT NOTE     Patient: Beryle Mako MRN: [id number]  PCP: Geraldyne Nyhan, MD   :     1955  Age:   78 y.o. Sex:  female      Referring physician: Fredi Hester MD  Reason for consultation: 78 y.o. female with Acute renal failure (ARF) ([facility] [W53.2] complicated by BRENNAN   Admission Date: 2021  9:35 PM  LOS: 0 days     DISCUSSION / PLAN :   Acute renal failure on CKD IV  - Likely prerenal 2/2 hypoperfusion, ? ATN   - Recent creat from 2021 3.66 mg/dl  - Patient follows Dr. Jennifer Sun in [facility] for her kidney disease, last OV approx 2 months ago  - Creat upon admission 6.80 mg/dl, BUN 58, lytes stable, corrected CA 9.4  - No urgent need for RRT  - ct scan-- no hydronephrosis  - urine chemistries and creatinine  - avoid nephrotoxins/renally dose medications  - Strict I/Os  - Serial BMP    Pulmonary Edema/SOB  - on BIPAP  - Give 80mg IV Lasix times 1 dose  - Monitor patient for diuresing, if minimal UO, call nephrology  - BMP in the am    CKD IV  - Likely secondary to long standing HTN/DM  - Unknown Baseline, recent creat 3.66 mg/dl in 2021  - Follows Dr. Babar Alvarez    Anemia  - Hgb 6.0, no noticeable signs of bleeding  - s/p 1 unit PRBCs  - ct abd/pel completed    HTN  - On Amlodipine and carvedilol at home   - Hold ACE (I)/ARBs      DM2  HLD               Subjective:   HPI: Beryle Mako is a 78 y. o.female who has a PMHx significant for CKD IV, HTN, DM, HLD who presented to the ED with c/c of worsening SOB. Patient was found to be in acute renal failure (creat 6.8 mg/dl) with pulmonary edema, BIPAP was placed to maximize oxygenation, sating aroung 96-97%. HGB was found to be 6.0 g/dl, 1 unit of blood transfusing. Patient denies any urinary changes or decrease in UO. Does endorse some left-sided flank pain. +SOB, edema. She denies any noticeable bleeding. CT of the abd/pel completed.   Nephrology consulted for Sybil Sun in [address] [place] [address] known creat 3.6 mg/dl prior to admission    Past Medical Hx:   Past Medical History:   Diagnosis Date    Asthma     CAD (coronary artery disease)     [facility])     COPD (chronic obstructive pulmonary disease) ([facility])     Diabetes ([facility] [address]     Hypertension         Past Surgical Hx:     Past Surgical History:   Procedure Laterality Date    DELIVERY       HX CORONARY STENT PLACEMENT      HX HYSTERECTOMY      [place] CARDIAC SURG PROCEDURE UNLIST      VASCULAR SURGERY PROCEDURE UNLIST      leg stent       Medications:  Prior to Admission medications    Medication Sig Start Date End Date Taking? Authorizing Provider   mometasone-formoterol (DULERA) 200-5 mcg/actuation HFA inhaler Take 2 Puffs by inhalation two (2) times a day. Provider, Historical   furosemide (LASIX) 40 mg tablet Take 1 Tab by mouth daily. 19   Suzanne Muñiz MD   NIFEdipine ER (PROCARDIA XL) 60 mg ER tablet Take 1 Tab by mouth daily. 18   Kaveh Moreno MD   rosuvastatin (CRESTOR) 20 mg tablet Take 1 Tab by mouth nightly. 18   Kaveh Moreno MD   insulin NPH/insulin regular (NOVOLIN 70/30 U-100 INSULIN) 100 unit/mL (70-30) injection 45 Units by SubCUTAneous route two (2) times a day. 18   Kaveh Moreno MD   fluticasone-salmeterol (ADVAIR DISKUS) 250-50 mcg/dose diskus inhaler Take 1 Puff by inhalation two (2) times daily as needed. Provider, Historical   metoprolol succinate (TOPROL XL) 100 mg tablet Take 100 mg by mouth daily. Provider, Historical   albuterol-ipratropium (DUO-NEB) 2.5 mg-0.5 mg/3 ml nebu 3 mL by Nebulization route every six (6) hours as needed. 3/19/18   Kaveh Moreno MD   montelukast (SINGULAIR) 10 mg tablet Take 1 Tab by mouth nightly. 3/19/18   Kaveh Moreno MD   aspirin (ASPIRIN) 325 mg tablet Take 325 mg by mouth daily. Other, MD Tony       Allergies   Allergen Reactions    Ivp Dye [Fd And C Blue No.1] Hives       Social Hx:  reports that she quit smoking about 4 weeks ago.  She smoked 0.25 packs per day. She does not have any smokeless tobacco history on file. She reports that she does not drink alcohol. No family history on file. Review of Systems:  A twelve point review of system was performed today. Pertinent positives and negatives are mentioned in the HPI. The reminder of the ROS is negative and noncontributory. Objective:    Vitals:    Vitals:    12/02/21 1940 12/02/21 2257   BP: 118/67    Pulse: 97    Resp: 18    Temp: 97.6 °F (36.4 °C)    SpO2: 96% 100%     I&O's:  No intake/output data recorded. Visit Vitals  /67 (BP 1 Location: Left upper arm, BP Patient Position: Sitting)   Pulse 97   Temp 97.6 °F (36.4 °C)   Resp 18   SpO2 100%       Physical Exam:  General:Alert, No distress,   HEENT: Eyes are PERRL. Conjunctiva without pallor ,erythema.  Sclerae: +/- Icterus  Neck: Supple,no mass palpable  Lungs : BIPAP  CVS: RRR, S1 S2 normal, No rub, + LE edema  Abdomen: Soft, Non tender, No hepatosplenomegaly, bowel sounds present  Extremities: No cyanosis  Neurologic: non focal, AAO x 3  Psych: normal affect    Laboratory Results:    Lab Results   Component Value Date    BUN 58 (H) 12/02/2021     12/02/2021    K 4.3 12/02/2021     (H) 12/02/2021    CO2 21 12/02/2021       Lab Results   Component Value Date    BUN 58 (H) 12/02/2021    BUN 33 (H) 06/20/2020    BUN 25 (H) 04/28/2019    BUN 24 (H) 04/27/2019    BUN 26 (H) 04/26/2019    K 4.3 12/02/2021    K 4.2 06/20/2020    K 3.7 04/28/2019    K 3.3 (L) 04/27/2019    K 2.9 (L) 04/26/2019       Lab Results   Component Value Date    WBC 9.8 12/02/2021    RBC 1.98 (L) 12/02/2021    HGB 6.0 (L) 12/02/2021    HCT 19.7 (L) 12/02/2021    MCV 99.5 (H) 12/02/2021    MCH 30.3 12/02/2021    RDW 15.8 (H) 12/02/2021     12/02/2021       Lab Results   Component Value Date    PHOS 4.3 04/25/2019       Urine dipstick:   Lab Results   Component Value Date/Time    Color YELLOW/STRAW 04/25/2019 06:25 AM    Appearance CLEAR 04/25/2019 06:25 AM    Specific gravity 1.013 04/25/2019 06:25 AM    Specific gravity 1.010 06/08/2011 12:00 AM    pH (UA) 6.0 04/25/2019 06:25 AM    Protein 300 (A) 04/25/2019 06:25 AM    Glucose 500 (A) 04/25/2019 06:25 AM    Ketone NEGATIVE  04/25/2019 06:25 AM    Bilirubin NEGATIVE  04/25/2019 06:25 AM    Urobilinogen 1.0 04/25/2019 06:25 AM    Nitrites NEGATIVE  04/25/2019 06:25 AM    Leukocyte Esterase NEGATIVE  04/25/2019 06:25 AM    Epithelial cells FEW 04/25/2019 06:25 AM    Bacteria NEGATIVE  04/25/2019 06:25 AM    WBC 0-4 04/25/2019 06:25 AM    RBC 0-5 04/25/2019 06:25 AM       I have reviewed the following: All pertinent labs, microbiology data, radiology imaging for my assessment     Medications list Personally Reviewed   [x]      Yes     []               No      Thank you for allowing us to participate in the care of this patient. We will follow patient. Please dont hesitate to call with any questions    Leah Norman NP  12/2/2021    [address] [facility]  [address] [facility]  Phone - [phone]  Fax - [phone]  [email or web address]

## 2021-12-03 NOTE — ED NOTES
Blood transfusion complete. Pt tolerated transfusion well and reports feeling a bit better after completion. Pt denies any symptoms of transfusion reaction and vitals remained stable. VSS listed in chart. Routine CBC scheduled for 0400.

## 2021-12-03 NOTE — NURSE NAVIGATOR
Chart reviewed by Heart Failure Nurse Navigator. Heart Failure database completed. EF:  Prior echo shows EF 61/65%    ACEi/ARB/ARNi: not currently indicated    BB: toprol xl 100 mg daily    Aldosterone Antagonist: not currently indicated    Obstructive Sleep Apnea Screening: screening priority 1   STOP-BANG score:   Referred to Sleep Medicine:     CRT not currently indicated. NYHA Functional Class documentation requested. Heart Failure Teach Back in Patient Education. Heart Failure Avoiding Triggers on Discharge Instructions. Cardiologist: has been seen by [facility] in the past -  Dr. Bony Zuniga has been consulted      Post discharge follow up phone call to be made within 48-72 hours of discharge.

## 2021-12-03 NOTE — PROGRESS NOTES
[facility]   [address] [facility], [id number] [place] [address]  Phone: [phone]   [phone]    [email or web address]     Nephrology Progress Note    Patient Name : Richar Sands      : 1955     MRN : [id number]  Date of Admission : 2021  Date of Servive : 21    CC: Follow up for ARF      Assessment and Plan   BRENNAN on CKD   - progressive CKD vs other   - uremic w/ fluid overload  - discussed w/ pt and daughter, consented for dialysis and Darryl   - HD today, tomorrow and then Monday     Nephrotic Range Proteinuria   - ordered Gammopathy eval and serologies   - quantify proteinuria , previously 12 gm in Sep     CKD 4  - progressive CKD - presumed DKD  - baseline Cr 3.9 mg/dl in   - f/b Dr Joshua Zuniga at [facility]     Severe anemia :  - 2/2 ckd, GI bleed from Bc powders vs others  - check iron profile   - start MADY  - stool occult     Back pain   - ? 2/2 R psoas muscle Lipoma     HTN   DM-II, HLD  Obesity      Interval History:  Seen and examined. Discussed labs w/ pt and daughter (phone)   She reports taking BC powders only for 2 weeks for back pain - B/L flank   CT results noted     Review of Systems: A comprehensive review of systems was negative except for that written in the HPI.     Current Medications:   Current Facility-Administered Medications   Medication Dose Route Frequency    0.9% sodium chloride infusion 250 mL  250 mL IntraVENous PRN    0.9% sodium chloride infusion 250 mL  250 mL IntraVENous PRN    0.9% sodium chloride infusion 250 mL  250 mL IntraVENous PRN    sodium chloride (NS) flush 5-40 mL  5-40 mL IntraVENous Q8H    sodium chloride (NS) flush 5-40 mL  5-40 mL IntraVENous PRN    acetaminophen (TYLENOL) tablet 650 mg  650 mg Oral Q6H PRN    Or    acetaminophen (TYLENOL) suppository 650 mg  650 mg Rectal Q6H PRN    polyethylene glycol (MIRALAX) packet 17 g  17 g Oral DAILY PRN    ondansetron (ZOFRAN ODT) tablet 4 mg  4 mg Oral Q8H PRN    Or  ondansetron (ZOFRAN) injection 4 mg  4 mg IntraVENous Q6H PRN    albuterol-ipratropium (DUO-NEB) 2.5 MG-0.5 MG/3 ML  3 mL Nebulization Q4H PRN    aspirin tablet 325 mg  325 mg Oral DAILY    metoprolol succinate (TOPROL-XL) XL tablet 100 mg  100 mg Oral DAILY    NIFEdipine ER (PROCARDIA XL) tablet 60 mg  60 mg Oral DAILY    rosuvastatin (CRESTOR) tablet 20 mg  20 mg Oral QHS    montelukast (SINGULAIR) tablet 10 mg  10 mg Oral QHS    arformoterol 15 mcg/budesonide 0.25 mg neb solution   Nebulization BID RT    insulin lispro (HUMALOG) injection   SubCUTAneous AC&HS    glucose chewable tablet 16 g  4 Tablet Oral PRN    dextrose (D50W) injection syrg 12.5-25 g  12.5-25 g IntraVENous PRN    glucagon (GLUCAGEN) injection 1 mg  1 mg IntraMUSCular PRN      Allergies   Allergen Reactions    Ivp Dye [Fd And C Blue No.1] Hives       Objective:  Vitals:    Vitals:    12/03/21 0747 12/03/21 0817 12/03/21 0941 12/03/21 1000   BP: (!) 157/84 (!) 142/93 (!) 168/76    Pulse: 86 84 95 95   Resp: 15 17 20    Temp:       SpO2: 99%  100%      Intake and Output:  No intake/output data recorded. 12/01 1901 - 12/03 0700  In: 396.3   Out: -     Physical Examination:        General: Obese   Neck:  Supple, no mass  Resp:  Diminished at bases   CV:  RRR,  no murmur or rub, 1+ LE edema  GI:  Soft, NT, + BS, no HS megaly  Neurologic:  Non focal  Psych:             AAO x 3 appropriate affect   Skin:  No Rash      []    High complexity decision making was performed  []    Patient is at high-risk of decompensation with multiple organ involvement    Lab Data Personally Reviewed: I have reviewed all the pertinent labs, microbiology data and radiology studies during assessment.     Recent Labs     12/03/21 0359 12/02/21 2016    143   K 3.8 4.3   * 112*   CO2 21 21   GLU 65 144*   BUN 60* 58*   CREA 6.63* 6.80*   CA 7.4* 7.6*   MG 2.0  --    ALB  --  1.8*   ALT  --  18     Recent Labs     12/03/21  0359 12/02/21  2016   WBC 8.5 9.8   HGB 6.4* 6.0*   HCT 20.1* 19.7*    319     No results found for: SDES  Lab Results   Component Value Date/Time    Culture result: NO GROWTH 5 DAYS 03/15/2018 06:09 PM     Recent Results (from the past 24 hour(s))   EKG, 12 LEAD, INITIAL    Collection Time: 12/02/21  8:08 PM   Result Value Ref Range    Ventricular Rate 97 BPM    Atrial Rate 97 BPM    P-R Interval 156 ms    QRS Duration 82 ms    Q-T Interval 394 ms    QTC Calculation (Bezet) 500 ms    Calculated P Axis 63 degrees    Calculated R Axis 13 degrees    Calculated T Axis 65 degrees    Diagnosis       Normal sinus rhythm  Nonspecific ST and T wave abnormality  Prolonged QT  Abnormal ECG  When compared with ECG of 20-JUN-2020 00:05,  ST now depressed in Inferior leads  Nonspecific T wave abnormality now evident in Inferior leads  Nonspecific T wave abnormality, worse in Lateral leads     CBC WITH AUTOMATED DIFF    Collection Time: 12/02/21  8:16 PM   Result Value Ref Range    WBC 9.8 3.6 - 11.0 K/uL    RBC 1.98 (L) 3.80 - 5.20 M/uL    HGB 6.0 (L) 11.5 - 16.0 g/dL    HCT 19.7 (L) 35.0 - 47.0 %    MCV 99.5 (H) 80.0 - 99.0 FL    MCH 30.3 26.0 - 34.0 PG    MCHC 30.5 30.0 - 36.5 g/dL    RDW 15.8 (H) 11.5 - 14.5 %    PLATELET 344 944 - 907 K/uL    MPV 9.5 8.9 - 12.9 FL    NRBC 0.0 0  WBC    ABSOLUTE NRBC 0.00 0.00 - 0.01 K/uL    NEUTROPHILS 56 32 - 75 %    LYMPHOCYTES 30 12 - 49 %    MONOCYTES 7 5 - 13 %    EOSINOPHILS 5 0 - 7 %    BASOPHILS 1 0 - 1 %    IMMATURE GRANULOCYTES 1 (H) 0.0 - 0.5 %    ABS. NEUTROPHILS 5.5 1.8 - 8.0 K/UL    ABS. LYMPHOCYTES 2.9 0.8 - 3.5 K/UL    ABS. MONOCYTES 0.7 0.0 - 1.0 K/UL    ABS. EOSINOPHILS 0.5 (H) 0.0 - 0.4 K/UL    ABS. BASOPHILS 0.1 0.0 - 0.1 K/UL    ABS. IMM.  GRANS. 0.1 (H) 0.00 - 0.04 K/UL    DF SMEAR SCANNED      RBC COMMENTS ANISOCYTOSIS  1+        RBC COMMENTS MACROCYTOSIS  1+       METABOLIC PANEL, COMPREHENSIVE    Collection Time: 12/02/21  8:16 PM   Result Value Ref Range    Sodium 143 136 - 145 mmol/L    Potassium 4.3 3.5 - 5.1 mmol/L    Chloride 112 (H) 97 - 108 mmol/L    CO2 21 21 - 32 mmol/L    Anion gap 10 5 - 15 mmol/L    Glucose 144 (H) 65 - 100 mg/dL    BUN 58 (H) 6 - 20 MG/DL    Creatinine 6.80 (H) 0.55 - 1.02 MG/DL    BUN/Creatinine ratio 9 (L) 12 - 20      GFR est AA 7 (L) >60 ml/min/1.73m2    GFR est non-AA 6 (L) >60 ml/min/1.73m2    Calcium 7.6 (L) 8.5 - 10.1 MG/DL    Bilirubin, total 0.3 0.2 - 1.0 MG/DL    ALT (SGPT) 18 12 - 78 U/L    AST (SGOT) 20 15 - 37 U/L    Alk. phosphatase 99 45 - 117 U/L    Protein, total 7.0 6.4 - 8.2 g/dL    Albumin 1.8 (L) 3.5 - 5.0 g/dL    Globulin 5.2 (H) 2.0 - 4.0 g/dL    A-G Ratio 0.3 (L) 1.1 - 2.2     SAMPLES BEING HELD    Collection Time: 12/02/21  8:16 PM   Result Value Ref Range    SAMPLES BEING HELD 1 red     COMMENT        Add-on orders for these samples will be processed based on acceptable specimen integrity and analyte stability, which may vary by analyte.    TROPONIN-HIGH SENSITIVITY    Collection Time: 12/02/21  8:16 PM   Result Value Ref Range    Troponin-High Sensitivity 931 (HH) 0 - 51 ng/L   NT-PRO BNP    Collection Time: 12/02/21  8:16 PM   Result Value Ref Range    NT pro-BNP 6,759 (H) <125 PG/ML   IRON PROFILE    Collection Time: 12/02/21  8:16 PM   Result Value Ref Range    Iron 36 35 - 150 ug/dL    TIBC 210 (L) 250 - 450 ug/dL    Iron % saturation 17 (L) 20 - 50 %   FERRITIN    Collection Time: 12/02/21  8:16 PM   Result Value Ref Range    Ferritin 586 (H) 8 - 252 NG/ML   RETICULOCYTE COUNT    Collection Time: 12/02/21  8:16 PM   Result Value Ref Range    Reticulocyte count 3.2 (H) 0.7 - 2.1 %    Absolute Retic Cnt. 0.0641 0.0164 - 0.0776 M/ul   VITAMIN B12    Collection Time: 12/02/21  8:16 PM   Result Value Ref Range    Vitamin B12 589 193 - 986 pg/mL   FOLATE    Collection Time: 12/02/21  8:16 PM   Result Value Ref Range    Folate 18.1 5.0 - 21.0 ng/mL   HAPTOGLOBIN    Collection Time: 12/02/21  8:16 PM   Result Value Ref Range Haptoglobin 321 (H) 30 - 200 mg/dL   OCCULT BLOOD, STOOL    Collection Time: 12/02/21  9:55 PM   Result Value Ref Range    Occult blood, stool Negative NEG     RBC, ALLOCATE    Collection Time: 12/02/21 10:15 PM   Result Value Ref Range    HISTORY CHECKED? Historical check performed    TYPE & SCREEN    Collection Time: 12/02/21 10:22 PM   Result Value Ref Range    Crossmatch Expiration 12/05/2021,2359     ABO/Rh(D) B POSITIVE     Antibody screen NEG     Unit number [id number]     Blood component type  LR     Unit division 00     Status of unit TRANSFUSED     Crossmatch result Compatible     Unit number [id number]     Blood component type  LR     Unit division 00     Status of unit ISSUED     Crossmatch result Compatible     Unit number [id number]     Blood component type  LR     Unit division 00     Status of unit ALLOCATED     Crossmatch result Compatible    SAMPLES BEING HELD    Collection Time: 12/02/21 11:00 PM   Result Value Ref Range    SAMPLES BEING HELD 1, HOLD     COMMENT        Add-on orders for these samples will be processed based on acceptable specimen integrity and analyte stability, which may vary by analyte.    CREATININE, UR, RANDOM    Collection Time: 12/02/21 11:00 PM   Result Value Ref Range    Creatinine, urine 122.00 mg/dL   URINALYSIS W/MICROSCOPIC    Collection Time: 12/02/21 11:00 PM   Result Value Ref Range    Color YELLOW/STRAW      Appearance CLEAR CLEAR      Specific gravity 1.025 1.003 - 1.030      pH (UA) 5.5 5.0 - 8.0      Protein 300 (A) NEG mg/dL    Glucose 500 (A) NEG mg/dL    Ketone Negative NEG mg/dL    Bilirubin Negative NEG      Blood SMALL (A) NEG      Urobilinogen 1.0 0.2 - 1.0 EU/dL    Nitrites Negative NEG      Leukocyte Esterase Negative NEG      WBC 0-4 0 - 4 /hpf    RBC 0-5 0 - 5 /hpf    Epithelial cells FEW FEW /lpf    Bacteria Negative NEG /hpf   SODIUM, UR, RANDOM    Collection Time: 12/02/21 11:00 PM   Result Value Ref Range    Sodium,urine random 44 MMOL/L   METABOLIC PANEL, BASIC    Collection Time: 12/03/21  3:59 AM   Result Value Ref Range    Sodium 142 136 - 145 mmol/L    Potassium 3.8 3.5 - 5.1 mmol/L    Chloride 112 (H) 97 - 108 mmol/L    CO2 21 21 - 32 mmol/L    Anion gap 9 5 - 15 mmol/L    Glucose 65 65 - 100 mg/dL    BUN 60 (H) 6 - 20 MG/DL    Creatinine 6.63 (H) 0.55 - 1.02 MG/DL    BUN/Creatinine ratio 9 (L) 12 - 20      GFR est AA 8 (L) >60 ml/min/1.73m2    GFR est non-AA 6 (L) >60 ml/min/1.73m2    Calcium 7.4 (L) 8.5 - 10.1 MG/DL   CBC W/O DIFF    Collection Time: 12/03/21  3:59 AM   Result Value Ref Range    WBC 8.5 3.6 - 11.0 K/uL    RBC 2.08 (L) 3.80 - 5.20 M/uL    HGB 6.4 (L) 11.5 - 16.0 g/dL    HCT 20.1 (L) 35.0 - 47.0 %    MCV 96.6 80.0 - 99.0 FL    MCH 30.8 26.0 - 34.0 PG    MCHC 31.8 30.0 - 36.5 g/dL    RDW 15.4 (H) 11.5 - 14.5 %    PLATELET 696 432 - 204 K/uL    MPV 9.5 8.9 - 12.9 FL    NRBC 0.0 0  WBC    ABSOLUTE NRBC 0.00 0.00 - 0.01 K/uL   TROPONIN-HIGH SENSITIVITY    Collection Time: 12/03/21  3:59 AM   Result Value Ref Range    Troponin-High Sensitivity 1,251 (HH) 0 - 51 ng/L   SAMPLES BEING HELD    Collection Time: 12/03/21  3:59 AM   Result Value Ref Range    SAMPLES BEING HELD 1RED     COMMENT        Add-on orders for these samples will be processed based on acceptable specimen integrity and analyte stability, which may vary by analyte. MAGNESIUM    Collection Time: 12/03/21  3:59 AM   Result Value Ref Range    Magnesium 2.0 1.6 - 2.4 mg/dL   RBC, ALLOCATE    Collection Time: 12/03/21  5:15 AM   Result Value Ref Range    HISTORY CHECKED? Historical check performed    RBC, ALLOCATE    Collection Time: 12/03/21  8:00 AM   Result Value Ref Range    HISTORY CHECKED? Historical check performed            I have reviewed the flowsheets. Chart and Pertinent Notes have been reviewed. No change in PMH ,family and social history from Consult note.       [facility]

## 2021-12-03 NOTE — CONSULTS
Cardiology Note dictated # [id number]  Imp:  Acute renal failure with volume overload  History of CAD/PCI: remote  Recommendations:  Echo as ordered  MPI on Monday  Dialysis/Ultrafiltration planned  Further recommendations to follow.   Thank you for this referral.  Apolinar Rebollar MD  Interventional Cardiology  [facility]

## 2021-12-03 NOTE — PROGRESS NOTES
PRIMITIVO: Anticipate discharge home pending medical progress. Transportation likely in car with one of her children. Reason for Admission:  Acute renal failure                     RUR Score:    12%                 Plan for utilizing home health:    N/A      PCP: First and Last name:  Christina Bautista MD     Name of Practice:    Are you a current patient: Yes/No: yes   Approximate date of last visit:    Can you participate in a virtual visit with your PCP:                     Current Advanced Directive/Advance Care Plan: Full Code      Healthcare Decision Maker:   Click here to complete [employer] including selection of the Healthcare Decision Maker Relationship (ie \"Primary\")                       Transition of Care Plan:   CM met with patient at bedside. Patient is alert and oriented x4. Demographics confirmed. Patient lives alone in her home, but stated that her son, Carlee Ponce, sometimes stays with her. No DME. Patient is independent in ADLs and drives. Hx: CAD, CHF, COPD, asthma, diabetes, HTN. PCP confirmed and pharmacy used is [facility] located at [address] phone: [phone]. Nephrology informed CM that patient will be starting dialysis. If patient continues on dialysis, will need new outpatient dialysis set up in the community prior to discharge. Emergency contact: Vasiliy Armenta, daughter, [phone]    Medicare pt has received, reviewed, and signed 1st IM letter informing them of their right to appeal the discharge. Signed copied has been placed on pt bedside chart. Care Management Interventions  PCP Verified by CM: Yes (Dr. Kalpana Newton)  Mode of Transport at Discharge:  Other (see comment) (in car with children)  MyChart Signup: No  Discharge Durable Medical Equipment: No  Physical Therapy Consult: No  Occupational Therapy Consult: No  Speech Therapy Consult: No  Support Systems: Child(mansoor)  Confirm Follow Up Transport: Family  The Plan for Transition of Care is Related to the Following Treatment Goals : home  Discharge Location  Discharge Placement: Home with family assistance     Daija Brooks, [facility] [facility]  [phone]

## 2021-12-03 NOTE — PROGRESS NOTES
15:40 blood labs drawn and sent. Cardiology in room with Pt. Pt asked to void in [facility] after RN finished drawing labs/cardiology had left. Will reassess for urine sample. 16:40 HH resulted as hgb 8.6 and hct 27.6. MD notified as well as Pt's request for pain medication aside from tylenol. Bedside shift change report given to [address] [place] [address] Layla (oncoming nurse) by Deirdre Mar (offgoing nurse).  Report included the following information SBAR, Intake/Output, MAR, Recent Results and Cardiac Rhythm SR-ST.

## 2021-12-03 NOTE — ED TRIAGE NOTES
She reports several days of shortness of breath and some right rib pain.  She says she also has a headache and the pain runs down her neck and into her arm

## 2021-12-03 NOTE — H&P
HISTORY AND PHYSICAL      PCP: Salazar Rose MD  History source: the patient      CC: shortness of breath      HPI: 78 y.o lady w/ CAD, CHF, COPD, DM, HTN, asthma, CKD IV, who presents with shortness of breath. Symptoms began at least a week ago. She describes progressive dyspnea worsened with exertion. She denies associated chest pain, fever, n/v/d. She does have a mildly productive cough. PMH/PSH:  Past Medical History:   Diagnosis Date    Asthma     CAD (coronary artery disease)     [facility]     COPD [facility]ch[facility] obstructive pulmonary disease) ([facility]     Diabetes ([facility] [address]     Hypertension      Past Surgical History:   Procedure Laterality Date    DELIVERY       HX CORONARY STENT PLACEMENT      HX HYSTERECTOMY      MA CARDIAC SURG PROCEDURE UNLIST      VASCULAR SURGERY PROCEDURE UNLIST      leg stent       Home meds:   Prior to Admission medications    Medication Sig Start Date End Date Taking? Authorizing Provider   mometasone-formoterol (DULERA) 200-5 mcg/actuation HFA inhaler Take 2 Puffs by inhalation two (2) times a day. Provider, Historical   furosemide (LASIX) 40 mg tablet Take 1 Tab by mouth daily. 19   Yahaira Marquez MD   NIFEdipine ER (PROCARDIA XL) 60 mg ER tablet Take 1 Tab by mouth daily. 18   Grey Serna MD   rosuvastatin (CRESTOR) 20 mg tablet Take 1 Tab by mouth nightly. 18   Grey Serna MD   insulin NPH/insulin regular (NOVOLIN 70/30 U-100 INSULIN) 100 unit/mL (70-30) injection 45 Units by SubCUTAneous route two (2) times a day. 18   Grey Serna MD   fluticasone-salmeterol (ADVAIR DISKUS) 250-50 mcg/dose diskus inhaler Take 1 Puff by inhalation two (2) times daily as needed. Provider, Historical   metoprolol succinate (TOPROL XL) 100 mg tablet Take 100 mg by mouth daily. Provider, Historical   albuterol-ipratropium (DUO-NEB) 2.5 mg-0.5 mg/3 ml nebu 3 mL by Nebulization route every six (6) hours as needed. 3/19/18   Sheryle Holland, MD   montelukast (SINGULAIR) 10 mg tablet Take 1 Tab by mouth nightly. 3/19/18   Sheryle Holland, MD   aspirin (ASPIRIN) 325 mg tablet Take 325 mg by mouth daily. Other, MD Tony       Allergies: Allergies   Allergen Reactions    Ivp Dye [Fd And C Blue No.1] Hives       FH:  No family history on file. SH:  Social History     Tobacco Use    Smoking status: Former Smoker     Packs/day: 0.25     Quit date: 2021     Years since quittin.0    Smokeless tobacco: Not on file   Substance Use Topics    Alcohol use: No       ROS: A comprehensive review of systems was negative except for that written in the HPI.       PHYSICAL EXAM:  Visit Vitals  BP (!) 152/94 (BP 1 Location: Right upper arm, BP Patient Position: Semi fowlers)   Pulse 94   Temp 97.1 °F (36.2 °C)   Resp 18   SpO2 100%       Gen: NAD, non-toxic  HEENT: anicteric sclerae, normal conjunctiva, bipap in place  Neck: supple, trachea midline, no adenopathy  Heart: RRR, no MRG, no JVD, ++ RLE edema  Lungs: feint b/l crackles anteriorly, non-labored respirations on bipap  Abd: soft, NT, ND, BS+  Extr: warm  Skin: dry, no rash  Neuro: CN II-XII grossly intact, normal speech, moves all extremities  Psych: normal mood, appropriate affect      Labs/Imaging:  Recent Results (from the past 24 hour(s))   EKG, 12 LEAD, INITIAL    Collection Time: 21  8:08 PM   Result Value Ref Range    Ventricular Rate 97 BPM    Atrial Rate 97 BPM    P-R Interval 156 ms    QRS Duration 82 ms    Q-T Interval 394 ms    QTC Calculation (Bezet) 500 ms    Calculated P Axis 63 degrees    Calculated R Axis 13 degrees    Calculated T Axis 65 degrees    Diagnosis       Normal sinus rhythm  Nonspecific ST and T wave abnormality  Prolonged QT  Abnormal ECG  When compared with ECG of 2020 00:05,  ST now depressed in Inferior leads  Nonspecific T wave abnormality now evident in Inferior leads  Nonspecific T wave abnormality, worse in Lateral leads CBC WITH AUTOMATED DIFF    Collection Time: 12/02/21  8:16 PM   Result Value Ref Range    WBC 9.8 3.6 - 11.0 K/uL    RBC 1.98 (L) 3.80 - 5.20 M/uL    HGB 6.0 (L) 11.5 - 16.0 g/dL    HCT 19.7 (L) 35.0 - 47.0 %    MCV 99.5 (H) 80.0 - 99.0 FL    MCH 30.3 26.0 - 34.0 PG    MCHC 30.5 30.0 - 36.5 g/dL    RDW 15.8 (H) 11.5 - 14.5 %    PLATELET 221 302 - 322 K/uL    MPV 9.5 8.9 - 12.9 FL    NRBC 0.0 0  WBC    ABSOLUTE NRBC 0.00 0.00 - 0.01 K/uL    NEUTROPHILS 56 32 - 75 %    LYMPHOCYTES 30 12 - 49 %    MONOCYTES 7 5 - 13 %    EOSINOPHILS 5 0 - 7 %    BASOPHILS 1 0 - 1 %    IMMATURE GRANULOCYTES 1 (H) 0.0 - 0.5 %    ABS. NEUTROPHILS 5.5 1.8 - 8.0 K/UL    ABS. LYMPHOCYTES 2.9 0.8 - 3.5 K/UL    ABS. MONOCYTES 0.7 0.0 - 1.0 K/UL    ABS. EOSINOPHILS 0.5 (H) 0.0 - 0.4 K/UL    ABS. BASOPHILS 0.1 0.0 - 0.1 K/UL    ABS. IMM. GRANS. 0.1 (H) 0.00 - 0.04 K/UL    DF SMEAR SCANNED      RBC COMMENTS ANISOCYTOSIS  1+        RBC COMMENTS MACROCYTOSIS  1+       METABOLIC PANEL, COMPREHENSIVE    Collection Time: 12/02/21  8:16 PM   Result Value Ref Range    Sodium 143 136 - 145 mmol/L    Potassium 4.3 3.5 - 5.1 mmol/L    Chloride 112 (H) 97 - 108 mmol/L    CO2 21 21 - 32 mmol/L    Anion gap 10 5 - 15 mmol/L    Glucose 144 (H) 65 - 100 mg/dL    BUN 58 (H) 6 - 20 MG/DL    Creatinine 6.80 (H) 0.55 - 1.02 MG/DL    BUN/Creatinine ratio 9 (L) 12 - 20      GFR est AA 7 (L) >60 ml/min/1.73m2    GFR est non-AA 6 (L) >60 ml/min/1.73m2    Calcium 7.6 (L) 8.5 - 10.1 MG/DL    Bilirubin, total 0.3 0.2 - 1.0 MG/DL    ALT (SGPT) 18 12 - 78 U/L    AST (SGOT) 20 15 - 37 U/L    Alk.  phosphatase 99 45 - 117 U/L    Protein, total 7.0 6.4 - 8.2 g/dL    Albumin 1.8 (L) 3.5 - 5.0 g/dL    Globulin 5.2 (H) 2.0 - 4.0 g/dL    A-G Ratio 0.3 (L) 1.1 - 2.2     SAMPLES BEING HELD    Collection Time: 12/02/21  8:16 PM   Result Value Ref Range    SAMPLES BEING HELD 1 red     COMMENT        Add-on orders for these samples will be processed based on acceptable specimen integrity and analyte stability, which may vary by analyte. TROPONIN-HIGH SENSITIVITY    Collection Time: 12/02/21  8:16 PM   Result Value Ref Range    Troponin-High Sensitivity 931 (HH) 0 - 51 ng/L   NT-PRO BNP    Collection Time: 12/02/21  8:16 PM   Result Value Ref Range    NT pro-BNP 6,759 (H) <125 PG/ML   OCCULT BLOOD, STOOL    Collection Time: 12/02/21  9:55 PM   Result Value Ref Range    Occult blood, stool Negative NEG     RBC, ALLOCATE    Collection Time: 12/02/21 10:15 PM   Result Value Ref Range    HISTORY CHECKED? Historical check performed    TYPE & SCREEN    Collection Time: 12/02/21 10:22 PM   Result Value Ref Range    Crossmatch Expiration 12/05/2021,2359     ABO/Rh(D) B POSITIVE     Antibody screen NEG     Unit number [id number]     Blood component type RC LR     Unit division 00     Status of unit ISSUED     Crossmatch result Compatible    SAMPLES BEING HELD    Collection Time: 12/02/21 11:00 PM   Result Value Ref Range    SAMPLES BEING HELD 1UA,UC HOLD     COMMENT        Add-on orders for these samples will be processed based on acceptable specimen integrity and analyte stability, which may vary by analyte. Recent Labs     12/02/21 2016   WBC 9.8   HGB 6.0*   HCT 19.7*        Recent Labs     12/02/21 2016      K 4.3   *   CO2 21   BUN 58*   CREA 6.80*   *   CA 7.6*     Recent Labs     12/02/21 2016   ALT 18   AP 99   TBILI 0.3   TP 7.0   ALB 1.8*   GLOB 5.2*       No results for input(s): CPK, CKNDX, TROIQ in the last 72 hours. No lab exists for component: CPKMB    No results for input(s): INR, PTP, APTT, INREXT in the last 72 hours. No results for input(s): PH, PCO2, PO2 in the last 72 hours. XR CHEST PA LAT    Result Date: 12/2/2021   Developing interstitial edema is suspected. .  . CT ABD PELV WO CONT    Result Date: 12/2/2021  1. No  calculus or hydronephrosis. 2. 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  3. Small pleural effusions. Mild diffuse body wall edema. XR CHEST PORT    Result Date: 12/2/2021  Stable mild pulmonary edema and small pleural effusions. Assessment & Plan:     Shortness of breath: this is likely due to pulmonary edema / volume overload in the setting of CKD and CHF. Symptomatic anemia may be contributing.  -continue BiPAP - she is not hypoxic but this was placed due to work of breathing  -IV furosemide once and re-assess  -transition to NC when able    BRENNAN on CKD IV:  -she is hypervolemic and being diuresed  -nephrology consulted and orders/recs noted    Anemia: no evidence of bleeding.  Stool is negative for occult blood.   -check iron studies, b12/folate, retics, haptoglobin  -pt receiving 1 U PRBC currently    Elevated serum troponin: suspect this is due to demand in the setting of renal failure, respiratory distress    Asymmetric LE edema: pt states this is chronic  -check RLE doppler    Type 2 DM:  -SSI/POC checks    HTN:  -continue metoprolol, nifedipine    COPD: no evidence of bronchospasm    Asthma    History of tobacco use, quit 1 month ago    DVT ppx: SCDs  Code status: full  Disposition: TBD    Signed By: Lucía Garcia MD     December 2, 2021

## 2021-12-03 NOTE — ED NOTES
TRANSFER - OUT REPORT:    Verbal report given to Nicole Chowdary RN(name) on [employer]  being transferred to [facility](unit) for routine progression of care       Report consisted of patients Situation, Background, Assessment and   Recommendations(SBAR). Information from the following report(s) SBAR, ED Summary, [facility] and Recent Results was reviewed with the receiving nurse. Lines:   Peripheral IV 12/02/21 Left Antecubital (Active)   Site Assessment Clean, dry, & intact 12/02/21 2016   Phlebitis Assessment 0 12/02/21 2016   Infiltration Assessment 0 12/02/21 2016   Dressing Status Clean, dry, & intact 12/02/21 2016   Dressing Type Transparent 12/02/21 2016   Hub Color/Line Status Pink 12/02/21 2016        Opportunity for questions and clarification was provided.       Patient transported with:   Registered Nurse

## 2021-12-04 LAB
ABO + RH BLD: NORMAL
ANA SER QL: NEGATIVE
ANION GAP SERPL CALC-SCNC: 9 MMOL/L (ref 5–15)
BLD PROD TYP BPU: NORMAL
BLOOD GROUP ANTIBODIES SERPL: NORMAL
BPU ID: NORMAL
BUN SERPL-MCNC: 39 MG/DL (ref 6–20)
BUN/CREAT SERPL: 8 (ref 12–20)
CALCIUM SERPL-MCNC: 8.3 MG/DL (ref 8.5–10.1)
CHLORIDE SERPL-SCNC: 105 MMOL/L (ref 97–108)
CO2 SERPL-SCNC: 22 MMOL/L (ref 21–32)
CREAT SERPL-MCNC: 4.86 MG/DL (ref 0.55–1.02)
CROSSMATCH RESULT,%XM: NORMAL
ERYTHROCYTE [DISTWIDTH] IN BLOOD BY AUTOMATED COUNT: 16.1 % (ref 11.5–14.5)
GLUCOSE BLD STRIP.AUTO-MCNC: 145 MG/DL (ref 65–117)
GLUCOSE BLD STRIP.AUTO-MCNC: 151 MG/DL (ref 65–117)
GLUCOSE BLD STRIP.AUTO-MCNC: 93 MG/DL (ref 65–117)
GLUCOSE BLD STRIP.AUTO-MCNC: 97 MG/DL (ref 65–117)
GLUCOSE SERPL-MCNC: 113 MG/DL (ref 65–100)
HCT VFR BLD AUTO: 27.8 % (ref 35–47)
HGB BLD-MCNC: 8.9 G/DL (ref 11.5–16)
MCH RBC QN AUTO: 29.9 PG (ref 26–34)
MCHC RBC AUTO-ENTMCNC: 32 G/DL (ref 30–36.5)
MCV RBC AUTO: 93.3 FL (ref 80–99)
NRBC # BLD: 0 K/UL (ref 0–0.01)
NRBC BLD-RTO: 0 PER 100 WBC
PLATELET # BLD AUTO: 323 K/UL (ref 150–400)
PMV BLD AUTO: 9.4 FL (ref 8.9–12.9)
POTASSIUM SERPL-SCNC: 3.6 MMOL/L (ref 3.5–5.1)
RBC # BLD AUTO: 2.98 M/UL (ref 3.8–5.2)
SERVICE CMNT-IMP: ABNORMAL
SERVICE CMNT-IMP: ABNORMAL
SERVICE CMNT-IMP: NORMAL
SERVICE CMNT-IMP: NORMAL
SODIUM SERPL-SCNC: 136 MMOL/L (ref 136–145)
SPECIMEN EXP DATE BLD: NORMAL
STATUS OF UNIT,%ST: NORMAL
UNIT DIVISION, %UDIV: 0
WBC # BLD AUTO: 8 K/UL (ref 3.6–11)

## 2021-12-04 PROCEDURE — 65660000001 HC RM ICU INTERMED STEPDOWN

## 2021-12-04 PROCEDURE — 90935 HEMODIALYSIS ONE EVALUATION: CPT

## 2021-12-04 PROCEDURE — 5A1D70Z PERFORMANCE OF URINARY FILTRATION, INTERMITTENT, LESS THAN 6 HOURS PER DAY: ICD-10-PCS | Performed by: INTERNAL MEDICINE

## 2021-12-04 PROCEDURE — 36415 COLL VENOUS BLD VENIPUNCTURE: CPT

## 2021-12-04 PROCEDURE — 74011250637 HC RX REV CODE- 250/637: Performed by: HOSPITALIST

## 2021-12-04 PROCEDURE — 74011250637 HC RX REV CODE- 250/637: Performed by: INTERNAL MEDICINE

## 2021-12-04 PROCEDURE — 74011000250 HC RX REV CODE- 250: Performed by: HOSPITALIST

## 2021-12-04 PROCEDURE — 94640 AIRWAY INHALATION TREATMENT: CPT

## 2021-12-04 PROCEDURE — 74011250637 HC RX REV CODE- 250/637: Performed by: FAMILY MEDICINE

## 2021-12-04 PROCEDURE — 80048 BASIC METABOLIC PNL TOTAL CA: CPT

## 2021-12-04 PROCEDURE — 74011636637 HC RX REV CODE- 636/637: Performed by: HOSPITALIST

## 2021-12-04 PROCEDURE — 82962 GLUCOSE BLOOD TEST: CPT

## 2021-12-04 PROCEDURE — 85027 COMPLETE CBC AUTOMATED: CPT

## 2021-12-04 RX ORDER — ROSUVASTATIN CALCIUM 10 MG/1
10 TABLET, COATED ORAL
Status: DISCONTINUED | OUTPATIENT
Start: 2021-12-04 | End: 2021-12-10 | Stop reason: HOSPADM

## 2021-12-04 RX ADMIN — Medication 10 ML: at 13:17

## 2021-12-04 RX ADMIN — INSULIN LISPRO 2 UNITS: 100 INJECTION, SOLUTION INTRAVENOUS; SUBCUTANEOUS at 17:36

## 2021-12-04 RX ADMIN — MONTELUKAST 10 MG: 10 TABLET, FILM COATED ORAL at 22:22

## 2021-12-04 RX ADMIN — ROSUVASTATIN 10 MG: 10 TABLET, FILM COATED ORAL at 22:22

## 2021-12-04 RX ADMIN — ACETAMINOPHEN 650 MG: 325 TABLET ORAL at 08:34

## 2021-12-04 RX ADMIN — OXYCODONE AND ACETAMINOPHEN 1 TABLET: 5; 325 TABLET ORAL at 13:18

## 2021-12-04 RX ADMIN — ACETAMINOPHEN 650 MG: 325 TABLET ORAL at 17:36

## 2021-12-04 RX ADMIN — ARFORMOTEROL TARTRATE: 15 SOLUTION RESPIRATORY (INHALATION) at 08:25

## 2021-12-04 NOTE — PROGRESS NOTES
Problem: Discharge Planning  Goal: *Discharge to safe environment  Outcome: Progressing Towards Goal  Goal: *Knowledge of medication management  Outcome: Progressing Towards Goal  Goal: *Knowledge of discharge instructions  Outcome: Progressing Towards Goal     Problem: Patient Education: Go to Patient Education Activity  Goal: Patient/Family Education  Outcome: Progressing Towards Goal     Problem: Falls - Risk of  Goal: *Absence of Falls  Description: Document Hipolito Munoz Fall Risk and appropriate interventions in the flowsheet.   Outcome: Progressing Towards Goal  Note: Fall Risk Interventions:  Mobility Interventions: Patient to call before getting OOB         Medication Interventions: Evaluate medications/consider consulting pharmacy, Patient to call before getting OOB, Teach patient to arise slowly    Elimination Interventions: Bed/chair exit alarm              Problem: Patient Education: Go to Patient Education Activity  Goal: Patient/Family Education  Outcome: Progressing Towards Goal

## 2021-12-04 NOTE — CONSULTS
[address]    Name:  Darwyn Brittle  MR#:  [id number]  :  1955  ACCOUNT #:  [de-identified]  DATE OF SERVICE:  2021    REFERRING PHYSICIAN:  Dr. Hermelinda Linda. REASON FOR CONSULTATION:  Consult has been requested to assist in the management of this patient who presents with pulmonary edema. HISTORY OF PRESENT ILLNESS:  As it turns out, the patient is overloaded and has acute renal failure. She does have a history of chronic renal failure, and is actually followed at [facility] and is on track for transplant. She denies any chest pain, but does have shortness of breath, fatigue, palpitations. She indicates that she had prior PCI remotely. According to our office records, she has stents in her AV groove and obtuse marginal arteries, in  and  respectively. Other medical problems; diabetes, hypertension, hyperlipidemia, peripheral arterial disease in family history. SOCIAL HISTORY:  The patient stopped smoking one month ago. REVIEW OF SYSTEMS:  Otherwise unremarkable. PHYSICAL EXAMINATION:  GENERAL:  This is a well-developed, pleasant, middle-aged woman in no distress. VITAL SIGNS:  As follows; blood pressure is 146/66, heart rate 90, sats 99% on 2 L of nasal O2. HEENT:  Unremarkable. NECK:  Supple. No adenopathy. CHEST WALL:  Nontender. LUNGS:  Decreased breath sounds with scattered rales in the bases bilaterally. HEART:  Muffled heart tones. Regular with moderate rhythm. No S3 gallop or friction rub. No thrills, lifts, or heaves. ABDOMEN:  Obese, nontender. No bruits. No ascites or palpable masses. EXTREMITIES:  1+ pretibial edema. NEUROLOGIC:  The patient is awake, alert, appropriate. Normal speech. Normal focal motor signs. LABORATORY DATA:  Labs include elevated high-sensitivity troponin. BUN and creatinine are reflective of acute on chronic kidney disease.   Chest x-ray demonstrates \"stable mild pulmonary edema and small pleural effusions. \"    IMPRESSION:  1. Volume overload from her renal failure. 2.  History of coronary disease, with remote percutaneous coronary intervention. 3.  The patient has new ST-segment depression in the inferior leads. RECOMMENDATIONS:  1.  Echocardiogram as ordered. 2.  We will order a stress test for Monday.   3.  Continue other treatments as outlined      Gabby Garcia MD      [id number]  D:  12/03/2021 17:00  T:  12/03/2021 21:12  JOB #:  [id number]

## 2021-12-04 NOTE — PROGRESS NOTES
[facility]   [address] [facility] [facility] [place], [address] [facility]  Phone: [phone]   [phone]    [email or web address]     Nephrology Progress Note    Patient Name : Radha Preston      : 1955     MRN : [id number]  Date of Admission : 2021  Date of Servive : 21    CC: Follow up for ARF      Assessment and Plan   BRENNAN on CKD   - progressive CKD vs other   - RIJ maya placed and started HD on 12/3  - HD # 2 today and # 3 on Monday   - daily labs     Nephrotic Range Proteinuria   - ordered Gammopathy eval and serologies   - quantify proteinuria , previously 12 gm in Sep     CKD 4  - progressive CKD - presumed DKD  - baseline Cr 3.9 mg/dl in   - f/b Dr Dalila Nassar at [facility]     Severe anemia :  - 2/2 ckd, GI bleed from Bc powders vs others  - check iron profile   - start MADY  - stool occult     Back pain   - ? 2/2 R psoas muscle Lipoma     HTN   DM-II, HLD  Obesity      Interval History:  Seen and examined. Did well w/ HD  Edema improving   Reports neck pain w/ RIJ catheter     Review of Systems: A comprehensive review of systems was negative except for that written in the HPI.     Current Medications:   Current Facility-Administered Medications   Medication Dose Route Frequency    0.9% sodium chloride infusion 250 mL  250 mL IntraVENous PRN    0.9% sodium chloride infusion 250 mL  250 mL IntraVENous PRN    oxyCODONE-acetaminophen (PERCOCET) 5-325 mg per tablet 1 Tablet  1 Tablet Oral Q4H PRN    morphine injection 2 mg  2 mg IntraVENous Q4H PRN    rosuvastatin (CRESTOR) tablet 5 mg  5 mg Oral QHS    heparin (porcine) 1,000 unit/mL injection 1,100 Units  1,100 Units Hemodialysis DIALYSIS PRN    heparin (porcine) 1,000 unit/mL injection 1,400 Units  1,400 Units Hemodialysis DIALYSIS PRN    0.9% sodium chloride infusion 250 mL  250 mL IntraVENous PRN    sodium chloride (NS) flush 5-40 mL  5-40 mL IntraVENous Q8H    sodium chloride (NS) flush 5-40 mL  5-40 mL IntraVENous PRN    acetaminophen (TYLENOL) tablet 650 mg  650 mg Oral Q6H PRN    Or    acetaminophen (TYLENOL) suppository 650 mg  650 mg Rectal Q6H PRN    polyethylene glycol (MIRALAX) packet 17 g  17 g Oral DAILY PRN    ondansetron (ZOFRAN ODT) tablet 4 mg  4 mg Oral Q8H PRN    Or    ondansetron (ZOFRAN) injection 4 mg  4 mg IntraVENous Q6H PRN    albuterol-ipratropium (DUO-NEB) 2.5 MG-0.5 MG/3 ML  3 mL Nebulization Q4H PRN    aspirin tablet 325 mg  325 mg Oral DAILY    metoprolol succinate (TOPROL-XL) XL tablet 100 mg  100 mg Oral DAILY    NIFEdipine ER (PROCARDIA XL) tablet 60 mg  60 mg Oral DAILY    montelukast (SINGULAIR) tablet 10 mg  10 mg Oral QHS    arformoterol 15 mcg/budesonide 0.25 mg neb solution   Nebulization BID RT    insulin lispro (HUMALOG) injection   SubCUTAneous AC&HS    glucose chewable tablet 16 g  4 Tablet Oral PRN    dextrose (D50W) injection syrg 12.5-25 g  12.5-25 g IntraVENous PRN    glucagon (GLUCAGEN) injection 1 mg  1 mg IntraMUSCular PRN      Allergies   Allergen Reactions    Ivp Dye [Fd And C Blue No.1] Hives       Objective:  Vitals:    Vitals:    12/04/21 0156 12/04/21 0328 12/04/21 0352 12/04/21 0554   BP:  (!) 142/54     Pulse: 88 86 93 82   Resp:  15     Temp:  99.1 °F (37.3 °C)     TempSrc:       SpO2:  97%     Weight:  98.5 kg (217 lb 2.5 oz)       Intake and Output:  No intake/output data recorded.   12/02 1901 - 12/04 0700  In: 840.3 [P.O.:444]  Out: 4630 [Urine:475]    Physical Examination:        General: Obese   Neck:  RIJ maya +  Resp:  Diminished at bases   CV:  RRR,  no murmur or rub, 1+ LE edema  GI:  Soft, NT, + BS, no HS megaly  Neurologic:  Non focal  Psych:             AAO x 3 appropriate affect   Skin:  No Rash      []    High complexity decision making was performed  []    Patient is at high-risk of decompensation with multiple organ involvement    Lab Data Personally Reviewed: I have reviewed all the pertinent labs, microbiology data and radiology studies during assessment. Recent Labs     12/04/21  0154 12/03/21  0359 12/02/21 2016    142 143   K 3.6 3.8 4.3    112* 112*   CO2 22 21 21   * 65 144*   BUN 39* 60* 58*   CREA 4.86* 6.63* 6.80*   CA 8.3* 7.4* 7.6*   MG  --  2.0  --    ALB  --   --  1.8*   ALT  --   --  18     Recent Labs     12/04/21  0154 12/03/21  1536 12/03/21  0359 12/02/21 2016   WBC 8.0  --  8.5 9.8   HGB 8.9* 8.6* 6.4* 6.0*   HCT 27.8* 27.6* 20.1* 19.7*     --  301 319     No results found for: SDES  Lab Results   Component Value Date/Time    Culture result: NO GROWTH 5 DAYS 03/15/2018 06:09 PM     Recent Results (from the past 24 hour(s))   GLUCOSE, POC    Collection Time: 12/03/21 11:23 AM   Result Value Ref Range    Glucose (POC) 92 65 - 117 mg/dL    Performed by Jamar MORA)    TROPONIN-HIGH SENSITIVITY    Collection Time: 12/03/21  3:36 PM   Result Value Ref Range    Troponin-High Sensitivity 1,039 (HH) 0 - 51 ng/L   HGB & HCT    Collection Time: 12/03/21  3:36 PM   Result Value Ref Range    HGB 8.6 (L) 11.5 - 16.0 g/dL    HCT 27.6 (L) 35.0 - 47.0 %   HEP B SURFACE AG    Collection Time: 12/03/21  3:36 PM   Result Value Ref Range    Hepatitis B surface Ag <0.10 Index    Hep B surface Ag Interp. Negative NEG     HEP B SURFACE AB    Collection Time: 12/03/21  3:36 PM   Result Value Ref Range    Hepatitis B surface Ab <3.10 mIU/mL    Hep B surface Ab Interp. NONREACTIVE NR     GLUCOSE, POC    Collection Time: 12/03/21  5:54 PM   Result Value Ref Range    Glucose (POC) 120 (H) 65 - 117 mg/dL    Performed by Jessa Downs    PROTEIN/CREATININE RATIO, URINE    Collection Time: 12/03/21  6:14 PM   Result Value Ref Range    Protein, urine random 966 (H) 0.0 - 11.9 mg/dL    Creatinine, urine 79.60 mg/dL    Protein/Creat.  urine Ratio 12.1     SAMPLES BEING HELD    Collection Time: 12/03/21  6:14 PM   Result Value Ref Range    SAMPLES BEING HELD 1cup urine     COMMENT        Add-on orders for these samples will be processed based on acceptable specimen integrity and analyte stability, which may vary by analyte. GLUCOSE, POC    Collection Time: 12/03/21  8:23 PM   Result Value Ref Range    Glucose (POC) 105 65 - 117 mg/dL    Performed by Samy Odell RN    CBC W/O DIFF    Collection Time: 12/04/21  1:54 AM   Result Value Ref Range    WBC 8.0 3.6 - 11.0 K/uL    RBC 2.98 (L) 3.80 - 5.20 M/uL    HGB 8.9 (L) 11.5 - 16.0 g/dL    HCT 27.8 (L) 35.0 - 47.0 %    MCV 93.3 80.0 - 99.0 FL    MCH 29.9 26.0 - 34.0 PG    MCHC 32.0 30.0 - 36.5 g/dL    RDW 16.1 (H) 11.5 - 14.5 %    PLATELET 461 389 - 474 K/uL    MPV 9.4 8.9 - 12.9 FL    NRBC 0.0 0  WBC    ABSOLUTE NRBC 0.00 0.00 - 8.51 K/uL   METABOLIC PANEL, BASIC    Collection Time: 12/04/21  1:54 AM   Result Value Ref Range    Sodium 136 136 - 145 mmol/L    Potassium 3.6 3.5 - 5.1 mmol/L    Chloride 105 97 - 108 mmol/L    CO2 22 21 - 32 mmol/L    Anion gap 9 5 - 15 mmol/L    Glucose 113 (H) 65 - 100 mg/dL    BUN 39 (H) 6 - 20 MG/DL    Creatinine 4.86 (H) 0.55 - 1.02 MG/DL    BUN/Creatinine ratio 8 (L) 12 - 20      GFR est AA 11 (L) >60 ml/min/1.73m2    GFR est non-AA 9 (L) >60 ml/min/1.73m2    Calcium 8.3 (L) 8.5 - 10.1 MG/DL   GLUCOSE, POC    Collection Time: 12/04/21  5:41 AM   Result Value Ref Range    Glucose (POC) 97 65 - 117 mg/dL    Performed by Samy Odell RN            I have reviewed the flowsheets. Chart and Pertinent Notes have been reviewed. No change in PMH ,family and social history from Consult note.       Yanira Leblanc [facility]

## 2021-12-04 NOTE — PROCEDURES
[facility]                      [phone]  Vitals Pre Post Assessment Pre Post   BP BP: (!) 150/49 (12/04/21 0900) 140/61 LOC A&Ox4, pleasant A&Ox4   HR Pulse (Heart Rate): 91 (12/04/21 0900) 81 Lungs Exp wheezing, NC @ 2L O2 Remains on 2L O2   Resp Resp Rate: 20 (12/04/21 0900) 20 Cardiac S1S2, NSR NSR   Temp Temp: 98.1 °F (36.7 °C) (12/04/21 0900) 98.2 Skin Warm, dry, intact Remains intact   Weight Pre-Dialysis Weight: 98.5 kg (217 lb 2.5 oz) (12/04/21 0900)  Edema BLE +1 +1   Pain Pain Intensity 1: 4 (12/04/21 0831)  Tele Status Bedside cardiac monitor      Orders   Duration: Start: 0900 End: 1230 Total: 3.5 hrs   Dialyzer: Dialyzer/Set Up Inspection: Revaclear (12/04/21 0900)   K Bath: Dialysate K (mEq/L): 3.5 (12/04/21 0900)   Ca Bath: Dialysate CA (mEq/L): 2.5 (12/04/21 0900)   Na: Dialysate NA (mEq/L): 140 (12/04/21 0900)   Bicarb: 35   Target Fluid Removal: Goal/Amount of Fluid to Remove (mL): 2000 mL (12/04/21 0900)     Access   Type & Location: RIJ non-tuneled CVC- transparent dsg CDI, dated 12/03/21. Comments: +aspiration/flush arterial lumen, sluggish aspiration of venous lumen, flushes easily.  as ordered with acceptable AP/.                       Labs   HBsAg (Antigen) / date: Neg (12/03/21)                                      HBsAb (Antibody) / date: <3/susceptible (12/03/21)   Source: [facility]   Obtained/Reviewed  Critical Results Called HGB   Date Value Ref Range Status   12/04/2021 8.9 (L) 11.5 - 16.0 g/dL Final     Potassium   Date Value Ref Range Status   12/04/2021 3.6 3.5 - 5.1 mmol/L Final     Calcium   Date Value Ref Range Status   12/04/2021 8.3 (L) 8.5 - 10.1 MG/DL Final     BUN   Date Value Ref Range Status   12/04/2021 39 (H) 6 - 20 MG/DL Final     Comment:     INVESTIGATED PER DELTA CHECK PROTOCOL     Creatinine   Date Value Ref Range Status   12/04/2021 4.86 (H) 0.55 - 1.02 MG/DL Final     Comment:     INVESTIGATED PER DELTA CHECK PROTOCOL        Meds Given Name Dose Route   heparin 1100 units  1400 units Arterial dwell  Venous dwell               Adequacy / Fluid    Total Liters Process: 1900 mL   Net Fluid Removed: 62L      Comments   Time Out Done: 9217   Admitting Diagnosis: BRENNAN on CKD   Consent obtained/signed: Informed Consent Verified: Yes (12/04/21 0900)   Machine / RO # Machine Number: [id number] (12/04/21 0900)   Primary Nurse Rpt Pre: Alexandre Hennessy RN   Primary Nurse Rpt Post: Alexandre Hennessy RN   Pt Education: CVC precautions, procedural, PD edu   Care Plan: HD today, monitor labs    Pts outpatient clinic: TBD     Tx Summary   Comments:                         4501: Safety checks complete, time out performed. 0900: CVC assessed, no redness, warmth or drainage noted. Transparent dressing and biopatch CDI. Each catheter limb disinfected for 60 seconds per limb with alcohol swabs. Caps removed, dialysis CVC hub scrubbed with Prevantics for 15 seconds, followed by a 5 second dry time per Hospital P&P. Aspirated and discarded 5ml from each lumen. +aspiration/flush. HD initiated. Access visible, lines secure. Medications reviewed. 1230: HD complete. All possible blood rinsed back. Each catheter limb disinfected for 60 seconds per limb with alcohol swabs. Dialysis CVC hubs scrubbed with Prevantics for 15 seconds, followed by a 5 second dry time per Hospital P&P. Saline flushed, locked and capped. SBAR given to primary RN.

## 2021-12-04 NOTE — PROGRESS NOTES
Cardiology Progress Note  2021     Admit Date: 2021  Admit Diagnosis: Acute renal failure (ARF) ([facility] [N17.9]  CC: none currently    Assessment:   Active Problems:    Acute renal failure (ARF) ([facility] (2021)      Plan:     Echo pending  NPO on  at [place] for stress test  Increased statin  Cont current cardiac meds    Subjective:      Jacki Romero doing much better with HD this AM. No cardiac c/o    Objective:    Physical Exam:  Overall VSSAF;    Visit Vitals  BP (!) 132/45   Pulse 81   Temp 98.1 °F (36.7 °C)   Resp 20   Wt 98.5 kg (217 lb 2.5 oz)   SpO2 99%   BMI 37.27 kg/m²     Temp (24hrs), Av.6 °F (37 °C), Min:98.1 °F (36.7 °C), Max:99.1 °F (37.3 °C)    Patient Vitals for the past 8 hrs:   Pulse   21 1000 81   21 0945 78   21 0930 90   21 0915 85   21 0900 91   21 0831 99   21 0554 82   21 0352 93   21 0328 86    Patient Vitals for the past 8 hrs:   Resp   21 0900 20   21 0831 18   21 0328 15    Patient Vitals for the past 8 hrs:   BP   21 0945 (!) 132/45   21 0930 (!) 142/55   21 0915 (!) 149/62   21 0900 (!) 150/49   21 0831 137/80   21 0328 (!) 142/54       1901 -  0700  In: 840.3 [P.O.:444]  Out: 7783 [Urine:475]      General Appearance: Well developed, well nourished, no acute distress. Ears/Nose/Mouth/Throat:   Normal MM; anicteric. JVP: WNL   Resp:   Lungs clear to auscultation bilaterally. Nl resp effort. Cardiovascular:  RRR, S1, S2 normal, no new murmur. No gallop or rub. Abdomen:   Soft, non-tender, bowel sounds are present. Extremities: No edema bilaterally. Skin:  Neuro: Warm and dry.   A/O x3, grossly nonfocal                         Data Review:     Telemetry independently reviewed :   normal sinus rhythm       ECG independently reviewed: NSR  Labs:   Recent Results (from the past 24 hour(s))   GLUCOSE, POC    Collection Time: 21 11:23 AM Result Value Ref Range    Glucose (POC) 92 65 - 117 mg/dL    Performed by Violetta HUDSONN)    TROPONIN-HIGH SENSITIVITY    Collection Time: 12/03/21  3:36 PM   Result Value Ref Range    Troponin-High Sensitivity 1,039 (HH) 0 - 51 ng/L   HGB & HCT    Collection Time: 12/03/21  3:36 PM   Result Value Ref Range    HGB 8.6 (L) 11.5 - 16.0 g/dL    HCT 27.6 (L) 35.0 - 47.0 %   HEP B SURFACE AG    Collection Time: 12/03/21  3:36 PM   Result Value Ref Range    Hepatitis B surface Ag <0.10 Index    Hep B surface Ag Interp. Negative NEG     HEP B SURFACE AB    Collection Time: 12/03/21  3:36 PM   Result Value Ref Range    Hepatitis B surface Ab <3.10 mIU/mL    Hep B surface Ab Interp. NONREACTIVE NR     GLUCOSE, POC    Collection Time: 12/03/21  5:54 PM   Result Value Ref Range    Glucose (POC) 120 (H) 65 - 117 mg/dL    Performed by Cam Dan    PROTEIN/CREATININE RATIO, URINE    Collection Time: 12/03/21  6:14 PM   Result Value Ref Range    Protein, urine random 966 (H) 0.0 - 11.9 mg/dL    Creatinine, urine 79.60 mg/dL    Protein/Creat. urine Ratio 12.1     SAMPLES BEING HELD    Collection Time: 12/03/21  6:14 PM   Result Value Ref Range    SAMPLES BEING HELD 1cup urine     COMMENT        Add-on orders for these samples will be processed based on acceptable specimen integrity and analyte stability, which may vary by analyte.    GLUCOSE, POC    Collection Time: 12/03/21  8:23 PM   Result Value Ref Range    Glucose (POC) 105 65 - 117 mg/dL    Performed by Kodi Corcoran RN    CBC W/O DIFF    Collection Time: 12/04/21  1:54 AM   Result Value Ref Range    WBC 8.0 3.6 - 11.0 K/uL    RBC 2.98 (L) 3.80 - 5.20 M/uL    HGB 8.9 (L) 11.5 - 16.0 g/dL    HCT 27.8 (L) 35.0 - 47.0 %    MCV 93.3 80.0 - 99.0 FL    MCH 29.9 26.0 - 34.0 PG    MCHC 32.0 30.0 - 36.5 g/dL    RDW 16.1 (H) 11.5 - 14.5 %    PLATELET 052 206 - 558 K/uL    MPV 9.4 8.9 - 12.9 FL    NRBC 0.0 0  WBC    ABSOLUTE NRBC 0.00 0.00 - 8.75 K/uL   METABOLIC PANEL, BASIC    Collection Time: 12/04/21  1:54 AM   Result Value Ref Range    Sodium 136 136 - 145 mmol/L    Potassium 3.6 3.5 - 5.1 mmol/L    Chloride 105 97 - 108 mmol/L    CO2 22 21 - 32 mmol/L    Anion gap 9 5 - 15 mmol/L    Glucose 113 (H) 65 - 100 mg/dL    BUN 39 (H) 6 - 20 MG/DL    Creatinine 4.86 (H) 0.55 - 1.02 MG/DL    BUN/Creatinine ratio 8 (L) 12 - 20      GFR est AA 11 (L) >60 ml/min/1.73m2    GFR est non-AA 9 (L) >60 ml/min/1.73m2    Calcium 8.3 (L) 8.5 - 10.1 MG/DL   GLUCOSE, POC    Collection Time: 12/04/21  5:41 AM   Result Value Ref Range    Glucose (POC) 97 65 - 117 mg/dL    Performed by Molly Carrillo RN       Current medications reviewed       Diane Clark MD

## 2021-12-04 NOTE — DIALYSIS
Hemodialysis / [phone]    Vitals Pre Post Assessment Pre Post   BP BP: (!) 161/107 (12/03/21 1945) 132/65 LOC AXOX3 AXOX3   HR Pulse (Heart Rate): 88 (12/03/21 1948) 91 Lungs Insp/exp wheezes over lung fields, humidified O2 2L/nc Exp wheezes, sat 94% on 2L/nc   Resp Resp Rate: 16 (12/03/21 1945) 21 Cardiac SR on monitor SR   Temp Temp: 98.6 °F (37 °C) (12/03/21 1945) 98.4 Skin W/D/I W/D/I   Weight Pre-Dialysis Weight: 101.6 kg (223 lb 15.8 oz) (12/03/21 1945) - 2 kg Edema Traces BLE None   Tele status Bedside/Remote Bedside/Remote Pain 0 0     Orders   Duration: Start: 1945 End: 2145 Total: 2hrs   Dialyzer: Dialyzer/Set Up Inspection: Noelle Nelson (12/03/21 1945)   K Bath: Dialysate K (mEq/L): 3 (12/03/21 1945)   Ca Bath: Dialysate CA (mEq/L): 2.5 (12/03/21 1945)   Na: Dialysate NA (mEq/L): 138 (12/03/21 1945)   Bicarb: Dialysate HCO3 (mEq/L): 35 (12/03/21 1945)   Target Fluid Removal: Goal/Amount of Fluid to Remove (mL): 2000 mL (12/03/21 1945)     Access   Type & Location: R IJ non tunneled CVC   Comments: Inserted and confirmed for use 12/03/21. Both ports with good aspiration/NS flush. Initial dsg changed as dsg was loose and biopatch exposed. New dsg dated for 12/03/21.                                       Labs   HBsAg (Antigen) / date:   Neg 12/03/21                                            HBsAb (Antibody) / date: Susceptible 12/03/21   Source: [facility]   Obtained/Reviewed  Critical Results Called HGB   Date Value Ref Range Status   12/03/2021 8.6 (L) 11.5 - 16.0 g/dL Final     Potassium   Date Value Ref Range Status   12/03/2021 3.8 3.5 - 5.1 mmol/L Final     Calcium   Date Value Ref Range Status   12/03/2021 7.4 (L) 8.5 - 10.1 MG/DL Final     BUN   Date Value Ref Range Status   12/03/2021 60 (H) 6 - 20 MG/DL Final     Creatinine   Date Value Ref Range Status   12/03/2021 6.63 (H) 0.55 - 1.02 MG/DL Final        Meds Given   Name Dose Route   Heparin 1100 units IC   Heparin 1400 units IC Adequacy / Fluid    Total Liters Process: 32.3   Net Fluid Removed: 2000 ml      Comments   Time Out Done:   (Time) 1942   Admitting Diagnosis: Acute Renal Failure   Consent obtained/signed: Informed Consent Verified: Yes (12/03/21 1945)   Machine / RO # Machine Number: [id number] (12/03/21 1945)   Primary Nurse Rpt Pre: Lata Stanley RN   Primary Nurse Rpt Post: Helio Reed RN   Pt Education: HD concepts, infection control, access care. Care Plan: 2nd HD tx on 12/04/21 per Nephrologist.   Pts outpatient clinic: N/A     Tx Summary   Comments: Uneventful 1st tx. Pt completed and tolerated well. All blood returned without issues. Pt was left in stable condition, conversing on personal phone. Report given to Primary nurse.

## 2021-12-04 NOTE — PROGRESS NOTES
[address] [facility]ospitalist Group                                                                                          Hospitalist Progress Note  Jony Baca MD  Answering service: [phone] OR [phone] from in house phone        Date of Service:  12/3/2021  NAME:  Mateus Hadley  :  1955  MRN:  [id number]      Admission Summary:   78 y.o lady w/ CAD, CHF, COPD, DM, HTN, asthma, CKD IV, who presents with shortness of breath. Symptoms began at least a week ago. She describes progressive dyspnea worsened with exertion. She denies associated chest pain, fever, n/v/d. She does have a mildly productive cough. Interval history / Subjective:   Patient reports shortness of breath  Severely elevated creatinine needing dialysis  Cont BiPAP      Assessment & Plan:     Shortness of breath: this is likely due to pulmonary edema / volume overload in the setting of CKD and CHF. Symptomatic anemia may be contributing.  -continue BiPAP - she is not hypoxic but this was placed due to work of breathing  -IV furosemide once and re-assess  -transition to NC when able     BRENNAN on CKD IV:  -nephrology consulted and orders/recs noted  Plans for maya catheter placement and initiation of dialysis     Anemia: no evidence of bleeding.  Stool is negative for occult blood.   -check iron studies, b12/folate, retics, haptoglobin  -pt transfused 2 units PRBCs     Elevated serum troponin: with EKG changes- consult cardiology for eval and tx recs     Asymmetric LE edema: pt states this is chronic  -check RLE doppler     Type 2 DM:  -SSI/POC checks     HTN:  -continue metoprolol, nifedipine     COPD: no evidence of bronchospasm     Asthma     History of tobacco use, quit 1 month ago  Code status: Full Code  DVT prophylaxis: SCDs    Care Plan discussed with: Patient/Family  Anticipated Disposition: Home w/Family  Anticipated Discharge: Greater than 48 hours     Hospital Problems  Date Reviewed: 2019          Codes Class Noted POA    Acute renal failure (ARF) (HCC) ICD-10-CM: N17.9  ICD-9-CM: 584.9  12/2/2021 Unknown                Review of Systems:   A comprehensive review of systems was negative except for that written in the HPI. Vital Signs:    Last 24hrs VS reviewed since prior progress note.  Most recent are:  Visit Vitals  BP (!) 155/74   Pulse 90   Temp 98.6 °F (37 °C) (Oral)   Resp 15   SpO2 93%     Patient Vitals for the past 24 hrs:   Temp Pulse Resp BP SpO2   12/03/21 2153 -- 90 -- -- --   12/03/21 2145 -- 88 -- (!) 155/74 --   12/03/21 2130 -- 88 -- (!) 157/68 --   12/03/21 2115 -- 89 -- (!) 160/91 --   12/03/21 2100 -- 88 15 (!) 167/89 93 %   12/03/21 2045 -- 88 -- (!) 177/84 --   12/03/21 2030 -- 89 -- (!) 143/79 --   12/03/21 2015 -- 84 -- (!) 170/78 --   12/03/21 2000 -- 90 18 (!) 150/78 100 %   12/03/21 1948 -- 88 -- -- --   12/03/21 1945 98.6 °F (37 °C) 89 16 (!) 161/107 100 %   12/03/21 1800 -- 90 -- -- --   12/03/21 1400 -- 90 -- -- --   12/03/21 1200 -- 93 -- -- --   12/03/21 1118 98.6 °F (37 °C) 94 15 (!) 146/66 99 %   12/03/21 1000 -- 95 -- -- --   12/03/21 0941 -- 95 20 (!) 168/76 100 %   12/03/21 0817 -- 84 17 (!) 142/93 --   12/03/21 0747 -- 86 15 (!) 157/84 99 %   12/03/21 0745 -- -- -- -- 100 %   12/03/21 0730 97.4 °F (36.3 °C) 89 16 (!) 143/95 100 %   12/03/21 0700 97.7 °F (36.5 °C) 91 20 (!) 161/74 100 %   12/03/21 0645 97.4 °F (36.3 °C) 90 16 131/76 100 %   12/03/21 0630 97.6 °F (36.4 °C) 85 16 (!) 145/86 99 %   12/03/21 0615 97.4 °F (36.3 °C) 91 17 (!) 146/92 100 %   12/03/21 0600 97.7 °F (36.5 °C) 84 14 138/76 100 %   12/03/21 0555 97.6 °F (36.4 °C) 88 14 (!) 166/79 100 %   12/03/21 0550 97.7 °F (36.5 °C) 90 16 (!) 147/81 100 %   12/03/21 0545 97.6 °F (36.4 °C) 91 15 (!) 146/75 100 %   12/03/21 0540 97.2 °F (36.2 °C) 90 14 133/75 100 %   12/03/21 0535 97 °F (36.1 °C) 92 19 126/84 100 %   12/03/21 0430 -- 83 15 122/70 100 %   12/03/21 0330 -- 91 17 -- 99 %   12/03/21 0230 97.6 °F (36.4 °C) 96 20 (!) 144/70 100 %   12/03/21 0200 97.4 °F (36.3 °C) 90 17 (!) 142/84 100 %   12/03/21 0130 97.2 °F (36.2 °C) 92 15 129/81 100 %   12/03/21 0100 97.8 °F (36.6 °C) 97 18 (!) 142/81 100 %   12/03/21 0045 97.8 °F (36.6 °C) 92 16 (!) 154/89 --   12/03/21 0030 97.6 °F (36.4 °C) 96 18 (!) 149/75 100 %   12/03/21 0015 97.8 °F (36.6 °C) -- 19 (!) 144/107 100 %   12/03/21 0000 97.6 °F (36.4 °C) 100 16 (!) 142/85 99 %   12/02/21 2355 97.7 °F (36.5 °C) 94 17 (!) 126/94 97 %   12/02/21 2350 97.7 °F (36.5 °C) 94 15 (!) 148/73 100 %   12/02/21 2345 97.7 °F (36.5 °C) 96 19 (!) 144/79 100 %   12/02/21 2340 97.1 °F (36.2 °C) 96 20 (!) 153/70 100 %   12/02/21 2335 97.1 °F (36.2 °C) 94 18 (!) 152/94 100 %   12/02/21 2331 97 °F (36.1 °C) 96 19 (!) 154/96 99 %   12/02/21 2257 -- -- -- -- 100 %       Intake/Output Summary (Last 24 hours) at 12/3/2021 2238  Last data filed at 12/3/2021 2145  Gross per 24 hour   Intake 840.3 ml   Output 2250 ml   Net -1409.7 ml        Physical Examination:     I had a face to face encounter with this patient and independently examined them on 12/3/2021 as outlined below:          Constitutional:  No acute distress, cooperative, pleasant    ENT:  Oral mucosa dry. Resp:  decreased breath sounds bilaterally. No wheezing/   CV:  Regular rhythm, normal rate, 3/6 SE murmurs,    GI:  Soft, non distended, non tender. Musculoskeletal:  No edema, warm, 2+ pulses throughout    Neurologic:  Moves all extremities. AAOx3, CN II-XII reviewed            Data Review:    Review and/or order of clinical lab test  Review and/or order of tests in the radiology section of CPT  Review and/or order of tests in the medicine section of CPT      Labs:     Recent Labs     12/03/21  1536 12/03/21  0359 12/02/21 2016 12/02/21 2016   WBC  --  8.5  --  9.8   HGB 8.6* 6.4*   < > 6.0*   HCT 27.6* 20.1*   < > 19.7*   PLT  --  301  --  319    < > = values in this interval not displayed.      Recent Labs     12/03/21  0359 12/02/21 2016    143   K 3.8 4.3   * 112*   CO2 21 21   BUN 60* 58*   CREA 6.63* 6.80*   GLU 65 144*   CA 7.4* 7.6*   MG 2.0  --      Recent Labs     12/02/21 2016   ALT 18   AP 99   TBILI 0.3   TP 7.0   ALB 1.8*   GLOB 5.2*     No results for input(s): INR, PTP, APTT, INREXT in the last 72 hours. Recent Labs     12/03/21  0359 12/02/21 2016   TIBC 224* 210*   PSAT 15* 17*   FERR  --  586*      Lab Results   Component Value Date/Time    Folate 18.1 12/02/2021 08:16 PM      No results for input(s): PH, PCO2, PO2 in the last 72 hours. No results for input(s): CPK, CKNDX, TROIQ in the last 72 hours.     No lab exists for component: CPKMB  Lab Results   Component Value Date/Time    Cholesterol, total 260 (H) 07/02/2018 04:27 AM    HDL Cholesterol 31 07/02/2018 04:27 AM    LDL, calculated 197.2 (H) 07/02/2018 04:27 AM    Triglyceride 159 (H) 07/02/2018 04:27 AM    CHOL/HDL Ratio 8.4 (H) 07/02/2018 04:27 AM     Lab Results   Component Value Date/Time    Glucose (POC) 105 12/03/2021 08:23 PM    Glucose (POC) 120 (H) 12/03/2021 05:54 PM    Glucose (POC) 92 12/03/2021 11:23 AM    Glucose (POC) 334 (H) 04/28/2019 11:21 AM    Glucose (POC) 280 (H) 04/28/2019 07:45 AM     Lab Results   Component Value Date/Time    Color YELLOW/STRAW 12/02/2021 11:00 PM    Appearance CLEAR 12/02/2021 11:00 PM    Specific gravity 1.025 12/02/2021 11:00 PM    Specific gravity 1.010 06/08/2011 12:00 AM    pH (UA) 5.5 12/02/2021 11:00 PM    Protein 300 (A) 12/02/2021 11:00 PM    Glucose 500 (A) 12/02/2021 11:00 PM    Ketone Negative 12/02/2021 11:00 PM    Bilirubin Negative 12/02/2021 11:00 PM    Urobilinogen 1.0 12/02/2021 11:00 PM    Nitrites Negative 12/02/2021 11:00 PM    Leukocyte Esterase Negative 12/02/2021 11:00 PM    Epithelial cells FEW 12/02/2021 11:00 PM    Bacteria Negative 12/02/2021 11:00 PM    WBC 0-4 12/02/2021 11:00 PM    RBC 0-5 12/02/2021 11:00 PM         Medications Reviewed:     Current Facility-Administered Medications   Medication Dose Route Frequency    0.9% sodium chloride infusion 250 mL  250 mL IntraVENous PRN    0.9% sodium chloride infusion 250 mL  250 mL IntraVENous PRN    heparin (porcine) 1,000 unit/mL injection        oxyCODONE-acetaminophen (PERCOCET) 5-325 mg per tablet 1 Tablet  1 Tablet Oral Q4H PRN    morphine injection 2 mg  2 mg IntraVENous Q4H PRN    rosuvastatin (CRESTOR) tablet 5 mg  5 mg Oral QHS    heparin (porcine) 1,000 unit/mL injection 1,100 Units  1,100 Units Hemodialysis DIALYSIS PRN    heparin (porcine) 1,000 unit/mL injection 1,400 Units  1,400 Units Hemodialysis DIALYSIS PRN    0.9% sodium chloride infusion 250 mL  250 mL IntraVENous PRN    sodium chloride (NS) flush 5-40 mL  5-40 mL IntraVENous Q8H    sodium chloride (NS) flush 5-40 mL  5-40 mL IntraVENous PRN    acetaminophen (TYLENOL) tablet 650 mg  650 mg Oral Q6H PRN    Or    acetaminophen (TYLENOL) suppository 650 mg  650 mg Rectal Q6H PRN    polyethylene glycol (MIRALAX) packet 17 g  17 g Oral DAILY PRN    ondansetron (ZOFRAN ODT) tablet 4 mg  4 mg Oral Q8H PRN    Or    ondansetron (ZOFRAN) injection 4 mg  4 mg IntraVENous Q6H PRN    albuterol-ipratropium (DUO-NEB) 2.5 MG-0.5 MG/3 ML  3 mL Nebulization Q4H PRN    aspirin tablet 325 mg  325 mg Oral DAILY    metoprolol succinate (TOPROL-XL) XL tablet 100 mg  100 mg Oral DAILY    NIFEdipine ER (PROCARDIA XL) tablet 60 mg  60 mg Oral DAILY    montelukast (SINGULAIR) tablet 10 mg  10 mg Oral QHS    arformoterol 15 mcg/budesonide 0.25 mg neb solution   Nebulization BID RT    insulin lispro (HUMALOG) injection   SubCUTAneous AC&HS    glucose chewable tablet 16 g  4 Tablet Oral PRN    dextrose (D50W) injection syrg 12.5-25 g  12.5-25 g IntraVENous PRN    glucagon (GLUCAGEN) injection 1 mg  1 mg IntraMUSCular PRN     ______________________________________________________________________  EXPECTED LENGTH OF STAY: 3d 19h  ACTUAL LENGTH OF STAY: 3 [facility] Anne-Marie Machado MD

## 2021-12-04 NOTE — PROGRESS NOTES
Bedside shift change report given to April(oncoming nurse) by Mariola Schmid (offgoing nurse). Report included the following information SBAR, Kardex, Intake/Output, MAR, Cardiac Rhythm NSR and Alarm Parameters .

## 2021-12-05 LAB
ANION GAP SERPL CALC-SCNC: 7 MMOL/L (ref 5–15)
BUN SERPL-MCNC: 26 MG/DL (ref 6–20)
BUN/CREAT SERPL: 7 (ref 12–20)
CALCIUM SERPL-MCNC: 8 MG/DL (ref 8.5–10.1)
CHLORIDE SERPL-SCNC: 105 MMOL/L (ref 97–108)
CO2 SERPL-SCNC: 25 MMOL/L (ref 21–32)
CREAT SERPL-MCNC: 3.74 MG/DL (ref 0.55–1.02)
ERYTHROCYTE [DISTWIDTH] IN BLOOD BY AUTOMATED COUNT: 15.3 % (ref 11.5–14.5)
GLUCOSE BLD STRIP.AUTO-MCNC: 194 MG/DL (ref 65–117)
GLUCOSE BLD STRIP.AUTO-MCNC: 197 MG/DL (ref 65–117)
GLUCOSE BLD STRIP.AUTO-MCNC: 220 MG/DL (ref 65–117)
GLUCOSE BLD STRIP.AUTO-MCNC: 233 MG/DL (ref 65–117)
GLUCOSE BLD STRIP.AUTO-MCNC: 288 MG/DL (ref 65–117)
GLUCOSE SERPL-MCNC: 208 MG/DL (ref 65–100)
HCT VFR BLD AUTO: 27.3 % (ref 35–47)
HGB BLD-MCNC: 8.7 G/DL (ref 11.5–16)
IRON SATN MFR SERPL: 19 % (ref 20–50)
IRON SERPL-MCNC: 39 UG/DL (ref 35–150)
MCH RBC QN AUTO: 30.2 PG (ref 26–34)
MCHC RBC AUTO-ENTMCNC: 31.9 G/DL (ref 30–36.5)
MCV RBC AUTO: 94.8 FL (ref 80–99)
NRBC # BLD: 0 K/UL (ref 0–0.01)
NRBC BLD-RTO: 0 PER 100 WBC
PLATELET # BLD AUTO: 310 K/UL (ref 150–400)
PMV BLD AUTO: 9.2 FL (ref 8.9–12.9)
POTASSIUM SERPL-SCNC: 3.8 MMOL/L (ref 3.5–5.1)
RBC # BLD AUTO: 2.88 M/UL (ref 3.8–5.2)
SERVICE CMNT-IMP: ABNORMAL
SODIUM SERPL-SCNC: 137 MMOL/L (ref 136–145)
TIBC SERPL-MCNC: 210 UG/DL (ref 250–450)
WBC # BLD AUTO: 7.4 K/UL (ref 3.6–11)

## 2021-12-05 PROCEDURE — 74011250637 HC RX REV CODE- 250/637: Performed by: HOSPITALIST

## 2021-12-05 PROCEDURE — 36415 COLL VENOUS BLD VENIPUNCTURE: CPT

## 2021-12-05 PROCEDURE — 94640 AIRWAY INHALATION TREATMENT: CPT

## 2021-12-05 PROCEDURE — 74011250637 HC RX REV CODE- 250/637: Performed by: INTERNAL MEDICINE

## 2021-12-05 PROCEDURE — 94760 N-INVAS EAR/PLS OXIMETRY 1: CPT

## 2021-12-05 PROCEDURE — 74011000250 HC RX REV CODE- 250: Performed by: HOSPITALIST

## 2021-12-05 PROCEDURE — 74011250637 HC RX REV CODE- 250/637: Performed by: FAMILY MEDICINE

## 2021-12-05 PROCEDURE — 80048 BASIC METABOLIC PNL TOTAL CA: CPT

## 2021-12-05 PROCEDURE — 82962 GLUCOSE BLOOD TEST: CPT

## 2021-12-05 PROCEDURE — 65660000001 HC RM ICU INTERMED STEPDOWN

## 2021-12-05 PROCEDURE — 85027 COMPLETE CBC AUTOMATED: CPT

## 2021-12-05 PROCEDURE — 83540 ASSAY OF IRON: CPT

## 2021-12-05 PROCEDURE — 74011636637 HC RX REV CODE- 636/637: Performed by: HOSPITALIST

## 2021-12-05 RX ORDER — HYDRALAZINE HYDROCHLORIDE 25 MG/1
50 TABLET, FILM COATED ORAL 3 TIMES DAILY
Status: DISCONTINUED | OUTPATIENT
Start: 2021-12-06 | End: 2021-12-10 | Stop reason: HOSPADM

## 2021-12-05 RX ORDER — HYDRALAZINE HYDROCHLORIDE 25 MG/1
25 TABLET, FILM COATED ORAL 3 TIMES DAILY
Status: DISCONTINUED | OUTPATIENT
Start: 2021-12-05 | End: 2021-12-05

## 2021-12-05 RX ADMIN — ASPIRIN 325 MG ORAL TABLET 325 MG: 325 PILL ORAL at 10:47

## 2021-12-05 RX ADMIN — HYDRALAZINE HYDROCHLORIDE 25 MG: 25 TABLET, FILM COATED ORAL at 11:59

## 2021-12-05 RX ADMIN — ACETAMINOPHEN 650 MG: 325 TABLET ORAL at 17:02

## 2021-12-05 RX ADMIN — INSULIN LISPRO 2 UNITS: 100 INJECTION, SOLUTION INTRAVENOUS; SUBCUTANEOUS at 17:02

## 2021-12-05 RX ADMIN — HYDRALAZINE HYDROCHLORIDE 25 MG: 25 TABLET, FILM COATED ORAL at 17:02

## 2021-12-05 RX ADMIN — HYDRALAZINE HYDROCHLORIDE 25 MG: 25 TABLET, FILM COATED ORAL at 22:49

## 2021-12-05 RX ADMIN — ACETAMINOPHEN 650 MG: 325 TABLET ORAL at 02:24

## 2021-12-05 RX ADMIN — INSULIN LISPRO 2 UNITS: 100 INJECTION, SOLUTION INTRAVENOUS; SUBCUTANEOUS at 11:59

## 2021-12-05 RX ADMIN — INSULIN LISPRO 3 UNITS: 100 INJECTION, SOLUTION INTRAVENOUS; SUBCUTANEOUS at 06:44

## 2021-12-05 RX ADMIN — METOPROLOL SUCCINATE 100 MG: 50 TABLET, EXTENDED RELEASE ORAL at 10:47

## 2021-12-05 RX ADMIN — ROSUVASTATIN 10 MG: 10 TABLET, FILM COATED ORAL at 22:49

## 2021-12-05 RX ADMIN — ARFORMOTEROL TARTRATE: 15 SOLUTION RESPIRATORY (INHALATION) at 21:06

## 2021-12-05 RX ADMIN — NIFEDIPINE 60 MG: 30 TABLET, FILM COATED, EXTENDED RELEASE ORAL at 10:47

## 2021-12-05 RX ADMIN — ARFORMOTEROL TARTRATE: 15 SOLUTION RESPIRATORY (INHALATION) at 07:27

## 2021-12-05 RX ADMIN — INSULIN LISPRO 3 UNITS: 100 INJECTION, SOLUTION INTRAVENOUS; SUBCUTANEOUS at 23:24

## 2021-12-05 RX ADMIN — MONTELUKAST 10 MG: 10 TABLET, FILM COATED ORAL at 22:49

## 2021-12-05 NOTE — PROGRESS NOTES
[address] [facility]  Hospitalist Group                                                                                          Hospitalist Progress Note  Apolinar Garduno MD  Answering service: [phone] OR [phone] from in house phone        Date of Service:  2021  NAME:  Arabella Nguyen  :  1955  MRN:  [id number]      Admission Summary:   78 y.o lady w/ CAD, CHF, COPD, DM, HTN, asthma, CKD IV, who presents with shortness of breath. Symptoms began at least a week ago. She describes progressive dyspnea worsened with exertion. She denies associated chest pain, fever, n/v/d. She does have a mildly productive cough. Interval history / Subjective:   Patient reports less shortness of breath  Severely elevated creatinine needing dialysis  Cont BiPAP      Assessment & Plan:     Shortness of breath: this is likely due to pulmonary edema / volume overload in the setting of CKD and CHF. Symptomatic anemia may be contributing. Now off BiPAP - she is not hypoxic but this was placed due to work of breathing  On RA today     BRENNAN on CKD IV:  -nephrology consulted and orders/recs noted  maya catheter placed and started hemodialysis 12/3/21     Anemia: no evidence of bleeding.  Stool is negative for occult blood.   -check iron studies, b12/folate, retics, haptoglobin  -pt transfused 2 units PRBCs     Elevated serum troponin: with EKG changes- consult cardiology for eval and tx recs  Plans for stress test on monday     Asymmetric LE edema: pt states this is chronic  - RLE doppler neg for DVT     Type 2 DM:  -SSI/POC checks     HTN:  -continue metoprolol, nifedipine     COPD/Asthma: no evidence of bronchospasm  Resume home meds  History of tobacco use, quit 1 month ago    Code status: Full Code  DVT prophylaxis: SCDs    Care Plan discussed with: Patient/Family  Anticipated Disposition: Home w/Family  Anticipated Discharge: Greater than 48 hours     Hospital Problems  Date Reviewed: 2019          Codes Class Noted POA    Acute renal failure (ARF) (HCC) ICD-10-CM: N17.9  ICD-9-CM: 584.9  12/2/2021 Unknown                Review of Systems:   A comprehensive review of systems was negative except for that written in the HPI. Vital Signs:    Last 24hrs VS reviewed since prior progress note.  Most recent are:  Visit Vitals  BP (!) 174/75 (BP 1 Location: Right upper arm, BP Patient Position: At rest)   Pulse 100   Temp 99 °F (37.2 °C)   Resp 26   Wt 98.5 kg (217 lb 2.5 oz)   SpO2 100%   BMI 37.27 kg/m²     Patient Vitals for the past 24 hrs:   Temp Pulse Resp BP SpO2   12/04/21 2054 99 °F (37.2 °C) 100 26 (!) 174/75 100 %   12/04/21 2013 -- -- -- -- 100 %   12/04/21 2010 99.2 °F (37.3 °C) 92 16 (!) 168/89 100 %   12/04/21 2007 -- 96 -- -- --   12/04/21 1800 -- (!) 103 -- -- --   12/04/21 1552 99.1 °F (37.3 °C) 97 20 (!) 167/74 93 %   12/04/21 1400 -- 97 -- -- --   12/04/21 1230 98.2 °F (36.8 °C) 81 20 (!) 140/61 --   12/04/21 1215 -- 79 -- (!) 104/43 --   12/04/21 1200 -- 80 -- (!) 114/57 --   12/04/21 1145 -- 85 -- (!) 138/59 --   12/04/21 1130 -- 89 -- (!) 158/58 --   12/04/21 1115 -- 90 -- (!) 148/67 --   12/04/21 1100 -- 89 -- (!) 155/68 --   12/04/21 1045 -- 75 -- (!) 130/51 --   12/04/21 1030 -- 83 -- (!) 128/57 --   12/04/21 1015 -- 80 -- (!) 153/65 --   12/04/21 1000 -- 81 -- (!) 119/54 --   12/04/21 0945 -- 78 -- (!) 132/45 --   12/04/21 0930 -- 90 -- (!) 142/55 --   12/04/21 0915 -- 85 -- (!) 149/62 --   12/04/21 0900 98.1 °F (36.7 °C) 91 20 (!) 150/49 99 %   12/04/21 0831 98.1 °F (36.7 °C) 99 18 137/80 100 %   12/04/21 0827 -- -- -- -- 98 %   12/04/21 0554 -- 82 -- -- --   12/04/21 0352 -- 93 -- -- --   12/04/21 0328 99.1 °F (37.3 °C) 86 15 (!) 142/54 97 %   12/04/21 0156 -- 88 -- -- --   12/04/21 0008 99.1 °F (37.3 °C) 90 20 128/70 95 %   12/03/21 2153 -- 90 -- -- --       Intake/Output Summary (Last 24 hours) at 12/4/2021 2149  Last data filed at 12/4/2021 1230  Gross per 24 hour   Intake --   Output 2125 ml   Net -2125 ml        Physical Examination:     I had a face to face encounter with this patient and independently examined them on 12/4/2021 as outlined below:          Constitutional:  No acute distress, cooperative, pleasant    ENT:  Oral mucosa dry. Resp:  decreased breath sounds bilaterally. No wheezing/   CV:  Regular rhythm, normal rate, 3/6 SE murmurs,    GI:  Soft, non distended, non tender. Musculoskeletal:  No edema, warm, 2+ pulses throughout    Neurologic:  Moves all extremities. AAOx3, CN II-XII reviewed            Data Review:    Review and/or order of clinical lab test  Review and/or order of tests in the radiology section of CPT  Review and/or order of tests in the medicine section of CPT      Labs:     Recent Labs     12/04/21 0154 12/03/21  1536 12/03/21 0359 12/03/21 0359   WBC 8.0  --   --  8.5   HGB 8.9* 8.6*   < > 6.4*   HCT 27.8* 27.6*   < > 20.1*     --   --  301    < > = values in this interval not displayed. Recent Labs     12/04/21  0154 12/03/21 0359 12/02/21 2016    142 143   K 3.6 3.8 4.3    112* 112*   CO2 22 21 21   BUN 39* 60* 58*   CREA 4.86* 6.63* 6.80*   * 65 144*   CA 8.3* 7.4* 7.6*   MG  --  2.0  --      Recent Labs     12/02/21 2016   ALT 18   AP 99   TBILI 0.3   TP 7.0   ALB 1.8*   GLOB 5.2*     No results for input(s): INR, PTP, APTT, INREXT, INREXT in the last 72 hours. Recent Labs     12/03/21 0359 12/02/21 2016   TIBC 224* 210*   PSAT 15* 17*   FERR  --  586*      Lab Results   Component Value Date/Time    Folate 18.1 12/02/2021 08:16 PM      No results for input(s): PH, PCO2, PO2 in the last 72 hours. No results for input(s): CPK, CKNDX, TROIQ in the last 72 hours.     No lab exists for component: CPKMB  Lab Results   Component Value Date/Time    Cholesterol, total 260 (H) 07/02/2018 04:27 AM    HDL Cholesterol 31 07/02/2018 04:27 AM    LDL, calculated 197.2 (H) 07/02/2018 04:27 AM    Triglyceride 159 (H) 07/02/2018 04:27 AM    CHOL/HDL Ratio 8.4 (H) 07/02/2018 04:27 AM     Lab Results   Component Value Date/Time    Glucose (POC) 145 (H) 12/04/2021 03:51 PM    Glucose (POC) 93 12/04/2021 11:03 AM    Glucose (POC) 97 12/04/2021 05:41 AM    Glucose (POC) 105 12/03/2021 08:23 PM    Glucose (POC) 120 (H) 12/03/2021 05:54 PM     Lab Results   Component Value Date/Time    Color YELLOW/STRAW 12/02/2021 11:00 PM    Appearance CLEAR 12/02/2021 11:00 PM    Specific gravity 1.025 12/02/2021 11:00 PM    Specific gravity 1.010 06/08/2011 12:00 AM    pH (UA) 5.5 12/02/2021 11:00 PM    Protein 300 (A) 12/02/2021 11:00 PM    Glucose 500 (A) 12/02/2021 11:00 PM    Ketone Negative 12/02/2021 11:00 PM    Bilirubin Negative 12/02/2021 11:00 PM    Urobilinogen 1.0 12/02/2021 11:00 PM    Nitrites Negative 12/02/2021 11:00 PM    Leukocyte Esterase Negative 12/02/2021 11:00 PM    Epithelial cells FEW 12/02/2021 11:00 PM    Bacteria Negative 12/02/2021 11:00 PM    WBC 0-4 12/02/2021 11:00 PM    RBC 0-5 12/02/2021 11:00 PM         Medications Reviewed:     Current Facility-Administered Medications   Medication Dose Route Frequency    rosuvastatin (CRESTOR) tablet 10 mg  10 mg Oral QHS    0.9% sodium chloride infusion 250 mL  250 mL IntraVENous PRN    0.9% sodium chloride infusion 250 mL  250 mL IntraVENous PRN    oxyCODONE-acetaminophen (PERCOCET) 5-325 mg per tablet 1 Tablet  1 Tablet Oral Q4H PRN    morphine injection 2 mg  2 mg IntraVENous Q4H PRN    heparin (porcine) 1,000 unit/mL injection 1,100 Units  1,100 Units Hemodialysis DIALYSIS PRN    heparin (porcine) 1,000 unit/mL injection 1,400 Units  1,400 Units Hemodialysis DIALYSIS PRN    0.9% sodium chloride infusion 250 mL  250 mL IntraVENous PRN    sodium chloride (NS) flush 5-40 mL  5-40 mL IntraVENous Q8H    sodium chloride (NS) flush 5-40 mL  5-40 mL IntraVENous PRN    acetaminophen (TYLENOL) tablet 650 mg  650 mg Oral Q6H PRN    Or    acetaminophen (TYLENOL) suppository 650 mg  650 mg Rectal Q6H PRN    polyethylene glycol (MIRALAX) packet 17 g  17 g Oral DAILY PRN    ondansetron (ZOFRAN ODT) tablet 4 mg  4 mg Oral Q8H PRN    Or    ondansetron (ZOFRAN) injection 4 mg  4 mg IntraVENous Q6H PRN    albuterol-ipratropium (DUO-NEB) 2.5 MG-0.5 MG/3 ML  3 mL Nebulization Q4H PRN    aspirin tablet 325 mg  325 mg Oral DAILY    metoprolol succinate (TOPROL-XL) XL tablet 100 mg  100 mg Oral DAILY    NIFEdipine ER (PROCARDIA XL) tablet 60 mg  60 mg Oral DAILY    montelukast (SINGULAIR) tablet 10 mg  10 mg Oral QHS    arformoterol 15 mcg/budesonide 0.25 mg neb solution   Nebulization BID RT    insulin lispro (HUMALOG) injection   SubCUTAneous AC&HS    glucose chewable tablet 16 g  4 Tablet Oral PRN    dextrose (D50W) injection syrg 12.5-25 g  12.5-25 g IntraVENous PRN    glucagon (GLUCAGEN) injection 1 mg  1 mg IntraMUSCular PRN     ______________________________________________________________________  EXPECTED LENGTH OF STAY: 3d 19h  ACTUAL LENGTH OF STAY:          2                 Bandar Go MD

## 2021-12-05 NOTE — PROGRESS NOTES
Bedside shift change report given to Belle West RN (oncoming nurse) by AprilMARIAN (offgoing nurse). Report included the following information SBAR, Kardex, Intake/Output, MAR, Accordion and Cardiac Rhythm NSR.

## 2021-12-05 NOTE — PROGRESS NOTES
[facility]   [address] [facility], [address] [place] [address]  Phone: [phone]   [phone]    [email or web address]     Nephrology Progress Note    Patient Name : Kwame Waldron      : 1955     MRN : [id number]  Date of Admission : 2021  Date of Servive : 21    CC: Follow up for ARF      Assessment and Plan   BRENNAN on CKD   - progressive CKD vs other   - HELGA trejo placed and started HD on 12/3  - HD #  3 on Monday  - Permacath placement on Tuesday   - CM to arrange out pt dialysis  @ [id number] [facility]   - daily labs     Nephrotic Range Proteinuria   - 12 gm  - f/u SPEP, UPEP, PLA2R screens    CKD 4  - progressive CKD - presumed DKD  - baseline Cr 3.9 mg/dl in   - f/b Dr Sue Odell at [facility]     Anemia in CKD '  - started MADY  - stool occult -ve     Back pain   - ?  R psoas muscle Lipoma     HTN   DM-II, HLD  Obesity      Interval History:  Seen and examined. Tolerated HD # 2 yesterday. No complaints today   We discussed permacath and d/c plans     Review of Systems: A comprehensive review of systems was negative except for that written in the HPI.     Current Medications:   Current Facility-Administered Medications   Medication Dose Route Frequency    [START ON 2021] epoetin joni-epbx (RETACRIT) injection 20,000 Units  20,000 Units SubCUTAneous Q MON, WED & FRI    rosuvastatin (CRESTOR) tablet 10 mg  10 mg Oral QHS    0.9% sodium chloride infusion 250 mL  250 mL IntraVENous PRN    0.9% sodium chloride infusion 250 mL  250 mL IntraVENous PRN    oxyCODONE-acetaminophen (PERCOCET) 5-325 mg per tablet 1 Tablet  1 Tablet Oral Q4H PRN    morphine injection 2 mg  2 mg IntraVENous Q4H PRN    heparin (porcine) 1,000 unit/mL injection 1,100 Units  1,100 Units Hemodialysis DIALYSIS PRN    heparin (porcine) 1,000 unit/mL injection 1,400 Units  1,400 Units Hemodialysis DIALYSIS PRN    0.9% sodium chloride infusion 250 mL  250 mL IntraVENous PRN    sodium chloride (NS) flush 5-40 mL  5-40 mL IntraVENous Q8H    sodium chloride (NS) flush 5-40 mL  5-40 mL IntraVENous PRN    acetaminophen (TYLENOL) tablet 650 mg  650 mg Oral Q6H PRN    Or    acetaminophen (TYLENOL) suppository 650 mg  650 mg Rectal Q6H PRN    polyethylene glycol (MIRALAX) packet 17 g  17 g Oral DAILY PRN    ondansetron (ZOFRAN ODT) tablet 4 mg  4 mg Oral Q8H PRN    Or    ondansetron (ZOFRAN) injection 4 mg  4 mg IntraVENous Q6H PRN    albuterol-ipratropium (DUO-NEB) 2.5 MG-0.5 MG/3 ML  3 mL Nebulization Q4H PRN    aspirin tablet 325 mg  325 mg Oral DAILY    metoprolol succinate (TOPROL-XL) XL tablet 100 mg  100 mg Oral DAILY    NIFEdipine ER (PROCARDIA XL) tablet 60 mg  60 mg Oral DAILY    montelukast (SINGULAIR) tablet 10 mg  10 mg Oral QHS    arformoterol 15 mcg/budesonide 0.25 mg neb solution   Nebulization BID RT    insulin lispro (HUMALOG) injection   SubCUTAneous AC&HS    glucose chewable tablet 16 g  4 Tablet Oral PRN    dextrose (D50W) injection syrg 12.5-25 g  12.5-25 g IntraVENous PRN    glucagon (GLUCAGEN) injection 1 mg  1 mg IntraMUSCular PRN      Allergies   Allergen Reactions    Ivp Dye [Fd And C Blue No.1] Hives       Objective:  Vitals:    Vitals:    12/05/21 0359 12/05/21 0554 12/05/21 0727 12/05/21 0824   BP:    (!) 177/82   Pulse: 91 94  92   Resp:    22   Temp:    98.4 °F (36.9 °C)   TempSrc:       SpO2:   99% 98%   Weight:         Intake and Output:  12/05 0701 - 12/05 1900  In: -   Out: 600 [Urine:600]  12/03 1901 - 12/05 0700  In: -   Out: 4110 [Urine:225]    Physical Examination:        General: Obese   Neck:  RIJ maya +  Resp:  Diminished at bases   CV:  RRR,  no murmur or rub, 1+ LE edema  GI:  Soft, NT, + BS, no HS megaly  Neurologic:  Non focal  Psych:             AAO x 3 appropriate affect   Skin:  No Rash      []    High complexity decision making was performed  []    Patient is at high-risk of decompensation with multiple organ involvement    Lab Data Personally Reviewed: I have reviewed all the pertinent labs, microbiology data and radiology studies during assessment.     Recent Labs     12/05/21 0211 12/04/21 0154 12/03/21 0359 12/02/21 2016    136 142 143   K 3.8 3.6 3.8 4.3    105 112* 112*   CO2 25 22 21 21   * 113* 65 144*   BUN 26* 39* 60* 58*   CREA 3.74* 4.86* 6.63* 6.80*   CA 8.0* 8.3* 7.4* 7.6*   MG  --   --  2.0  --    ALB  --   --   --  1.8*   ALT  --   --   --  18     Recent Labs     12/05/21 0211 12/04/21 0154 12/03/21  1536 12/03/21 0359 12/02/21 2016   WBC 7.4 8.0  --  8.5 9.8   HGB 8.7* 8.9* 8.6* 6.4* 6.0*   HCT 27.3* 27.8* 27.6* 20.1* 19.7*    323  --  301 319     No results found for: SDES  Lab Results   Component Value Date/Time    Culture result: NO GROWTH 5 DAYS 03/15/2018 06:09 PM     Recent Results (from the past 24 hour(s))   GLUCOSE, POC    Collection Time: 12/04/21 11:03 AM   Result Value Ref Range    Glucose (POC) 93 65 - 117 mg/dL    Performed by [address] POC    Collection Time: 12/04/21  3:51 PM   Result Value Ref Range    Glucose (POC) 145 (H) 65 - 117 mg/dL    Performed by [address] POC    Collection Time: 12/04/21 10:27 PM   Result Value Ref Range    Glucose (POC) 151 (H) 65 - 117 mg/dL    Performed by Dianne Amaro RN    CBC W/O DIFF    Collection Time: 12/05/21  2:11 AM   Result Value Ref Range    WBC 7.4 3.6 - 11.0 K/uL    RBC 2.88 (L) 3.80 - 5.20 M/uL    HGB 8.7 (L) 11.5 - 16.0 g/dL    HCT 27.3 (L) 35.0 - 47.0 %    MCV 94.8 80.0 - 99.0 FL    MCH 30.2 26.0 - 34.0 PG    MCHC 31.9 30.0 - 36.5 g/dL    RDW 15.3 (H) 11.5 - 14.5 %    PLATELET 123 995 - 552 K/uL    MPV 9.2 8.9 - 12.9 FL    NRBC 0.0 0  WBC    ABSOLUTE NRBC 0.00 0.00 - 3.48 K/uL   METABOLIC PANEL, BASIC    Collection Time: 12/05/21  2:11 AM   Result Value Ref Range    Sodium 137 136 - 145 mmol/L    Potassium 3.8 3.5 - 5.1 mmol/L    Chloride 105 97 - 108 mmol/L    CO2 25 21 - 32 mmol/L    Anion gap 7 5 - 15 mmol/L    Glucose 208 (H) 65 - 100 mg/dL    BUN 26 (H) 6 - 20 MG/DL    Creatinine 3.74 (H) 0.55 - 1.02 MG/DL    BUN/Creatinine ratio 7 (L) 12 - 20      GFR est AA 15 (L) >60 ml/min/1.73m2    GFR est non-AA 12 (L) >60 ml/min/1.73m2    Calcium 8.0 (L) 8.5 - 10.1 MG/DL   IRON PROFILE    Collection Time: 12/05/21  2:11 AM   Result Value Ref Range    Iron 39 35 - 150 ug/dL    TIBC 210 (L) 250 - 450 ug/dL    Iron % saturation 19 (L) 20 - 50 %   GLUCOSE, POC    Collection Time: 12/05/21  5:27 AM   Result Value Ref Range    Glucose (POC) 220 (H) 65 - 117 mg/dL    Performed by [address] [place] [address], POC    Collection Time: 12/05/21  5:58 AM   Result Value Ref Range    Glucose (POC) 233 (H) 65 - 117 mg/dL    Performed by Natasha Currie RN            I have reviewed the flowsheets. Chart and Pertinent Notes have been reviewed. No change in PMH ,family and social history from Consult note.       Yanira Jaquez [facility]

## 2021-12-05 NOTE — PROGRESS NOTES
Cardiology Progress Note  2021     Admit Date: 2021  Admit Diagnosis: Acute renal failure (ARF) ([facility] [address] [N17.9]  CC: none currently    Assessment:   Active Problems:    Acute renal failure (ARF) ([facility] [address] (2021)      Plan:     Echo pending  Stress test in AM, NPO at MN, no caffeine after 1800 today  Cont current cardiac meds    Subjective:      Jacki Romero resting comfortably this AM    Objective:    Physical Exam:  Overall VSSAF;    Visit Vitals  BP (!) 177/82 (BP 1 Location: Right arm, BP Patient Position: At rest)   Pulse 88   Temp 98.4 °F (36.9 °C)   Resp 22   Wt 98.5 kg (217 lb 2.5 oz)   SpO2 98%   BMI 37.27 kg/m²     Temp (24hrs), Av.8 °F (37.1 °C), Min:98.2 °F (36.8 °C), Max:99.2 °F (37.3 °C)    Patient Vitals for the past 8 hrs:   Pulse   21 1000 88   21 0824 92   21 0554 94   21 0359 91    Patient Vitals for the past 8 hrs:   Resp   21 0824 22    Patient Vitals for the past 8 hrs:   BP   21 0824 (!) 177/82       1901 -  0700  In: -   Out: 4369 [Urine:225]      General Appearance: Well developed, well nourished, no acute distress. Ears/Nose/Mouth/Throat:   Normal MM; anicteric. JVP: WNL   Resp:   Lungs clear to auscultation bilaterally. Nl resp effort. Cardiovascular:  RRR, S1, S2 normal, no new murmur. No gallop or rub. Abdomen:   Soft, non-tender, bowel sounds are present. Extremities: No edema bilaterally. Skin:  Neuro: Warm and dry.   A/O x3, grossly nonfocal                         Data Review:     Telemetry independently reviewed :   normal sinus rhythm       ECG independently reviewed: NSR  Labs:   Recent Results (from the past 24 hour(s))   GLUCOSE, POC    Collection Time: 21  3:51 PM   Result Value Ref Range    Glucose (POC) 145 (H) 65 - 117 mg/dL    Performed by Anna Valle, POC    Collection Time: 21 10:27 PM   Result Value Ref Range    Glucose (POC) 151 (H) 65 - 117 mg/dL    Performed by [employer] Leda Lerma RN    CBC W/O DIFF    Collection Time: 12/05/21  2:11 AM   Result Value Ref Range    WBC 7.4 3.6 - 11.0 K/uL    RBC 2.88 (L) 3.80 - 5.20 M/uL    HGB 8.7 (L) 11.5 - 16.0 g/dL    HCT 27.3 (L) 35.0 - 47.0 %    MCV 94.8 80.0 - 99.0 FL    MCH 30.2 26.0 - 34.0 PG    MCHC 31.9 30.0 - 36.5 g/dL    RDW 15.3 (H) 11.5 - 14.5 %    PLATELET 870 760 - 017 K/uL    MPV 9.2 8.9 - 12.9 FL    NRBC 0.0 0  WBC    ABSOLUTE NRBC 0.00 0.00 - 3.90 K/uL   METABOLIC PANEL, BASIC    Collection Time: 12/05/21  2:11 AM   Result Value Ref Range    Sodium 137 136 - 145 mmol/L    Potassium 3.8 3.5 - 5.1 mmol/L    Chloride 105 97 - 108 mmol/L    CO2 25 21 - 32 mmol/L    Anion gap 7 5 - 15 mmol/L    Glucose 208 (H) 65 - 100 mg/dL    BUN 26 (H) 6 - 20 MG/DL    Creatinine 3.74 (H) 0.55 - 1.02 MG/DL    BUN/Creatinine ratio 7 (L) 12 - 20      GFR est AA 15 (L) >60 ml/min/1.73m2    GFR est non-AA 12 (L) >60 ml/min/1.73m2    Calcium 8.0 (L) 8.5 - 10.1 MG/DL   IRON PROFILE    Collection Time: 12/05/21  2:11 AM   Result Value Ref Range    Iron 39 35 - 150 ug/dL    TIBC 210 (L) 250 - 450 ug/dL    Iron % saturation 19 (L) 20 - 50 %   GLUCOSE, POC    Collection Time: 12/05/21  5:27 AM   Result Value Ref Range    Glucose (POC) 220 (H) 65 - 117 mg/dL    Performed by [address] [place] [address], POC    Collection Time: 12/05/21  5:58 AM   Result Value Ref Range    Glucose (POC) 233 (H) 65 - 117 mg/dL    Performed by Dianne Amaro RN       Current medications reviewed       Deondre Barnhart MD

## 2021-12-05 NOTE — PROGRESS NOTES
Problem: Discharge Planning  Goal: *Discharge to safe environment  Outcome: Progressing Towards Goal  Goal: *Knowledge of medication management  Outcome: Progressing Towards Goal  Goal: *Knowledge of discharge instructions  Outcome: Progressing Towards Goal     Problem: Patient Education: Go to Patient Education Activity  Goal: Patient/Family Education  Outcome: Progressing Towards Goal     Problem: Falls - Risk of  Goal: *Absence of Falls  Description: Document Jyotsna Calvin Fall Risk and appropriate interventions in the flowsheet.   Outcome: Progressing Towards Goal  Note: Fall Risk Interventions:  Mobility Interventions: Patient to call before getting OOB         Medication Interventions: Patient to call before getting OOB    Elimination Interventions: Bed/chair exit alarm              Problem: Patient Education: Go to Patient Education Activity  Goal: Patient/Family Education  Outcome: Progressing Towards Goal     Problem: Breathing Pattern - Ineffective  Goal: *Absence of hypoxia  Outcome: Progressing Towards Goal

## 2021-12-06 ENCOUNTER — APPOINTMENT (OUTPATIENT)
Dept: NUCLEAR MEDICINE | Age: 66
DRG: 674 | End: 2021-12-06
Attending: INTERNAL MEDICINE
Payer: MEDICARE

## 2021-12-06 ENCOUNTER — APPOINTMENT (OUTPATIENT)
Dept: NON INVASIVE DIAGNOSTICS | Age: 66
DRG: 674 | End: 2021-12-06
Attending: INTERNAL MEDICINE
Payer: MEDICARE

## 2021-12-06 LAB
ALBUMIN SERPL ELPH-MCNC: 2.3 G/DL (ref 2.9–4.4)
ALBUMIN/GLOB SERPL: 0.6 {RATIO} (ref 0.7–1.7)
ALPHA1 GLOB SERPL ELPH-MCNC: 0.4 G/DL (ref 0–0.4)
ALPHA2 GLOB SERPL ELPH-MCNC: 1.2 G/DL (ref 0.4–1)
ANION GAP SERPL CALC-SCNC: 8 MMOL/L (ref 5–15)
B-GLOBULIN SERPL ELPH-MCNC: 1.5 G/DL (ref 0.7–1.3)
BUN SERPL-MCNC: 33 MG/DL (ref 6–20)
BUN/CREAT SERPL: 7 (ref 12–20)
CALCIUM SERPL-MCNC: 7.8 MG/DL (ref 8.5–10.1)
CHLORIDE SERPL-SCNC: 106 MMOL/L (ref 97–108)
CO2 SERPL-SCNC: 25 MMOL/L (ref 21–32)
CREAT SERPL-MCNC: 4.61 MG/DL (ref 0.55–1.02)
ERYTHROCYTE [DISTWIDTH] IN BLOOD BY AUTOMATED COUNT: 15.2 % (ref 11.5–14.5)
GAMMA GLOB SERPL ELPH-MCNC: 1 G/DL (ref 0.4–1.8)
GLOBULIN SER CALC-MCNC: 4.1 G/DL (ref 2.2–3.9)
GLUCOSE BLD STRIP.AUTO-MCNC: 121 MG/DL (ref 65–117)
GLUCOSE BLD STRIP.AUTO-MCNC: 130 MG/DL (ref 65–117)
GLUCOSE BLD STRIP.AUTO-MCNC: 164 MG/DL (ref 65–117)
GLUCOSE BLD STRIP.AUTO-MCNC: 216 MG/DL (ref 65–117)
GLUCOSE SERPL-MCNC: 134 MG/DL (ref 65–100)
HCT VFR BLD AUTO: 28.8 % (ref 35–47)
HGB BLD-MCNC: 9.2 G/DL (ref 11.5–16)
M PROTEIN SERPL ELPH-MCNC: ABNORMAL G/DL
MCH RBC QN AUTO: 30.2 PG (ref 26–34)
MCHC RBC AUTO-ENTMCNC: 31.9 G/DL (ref 30–36.5)
MCV RBC AUTO: 94.4 FL (ref 80–99)
NRBC # BLD: 0 K/UL (ref 0–0.01)
NRBC BLD-RTO: 0 PER 100 WBC
PLATELET # BLD AUTO: 303 K/UL (ref 150–400)
PMV BLD AUTO: 9.2 FL (ref 8.9–12.9)
POTASSIUM SERPL-SCNC: 3.8 MMOL/L (ref 3.5–5.1)
PROT SERPL-MCNC: 6.4 G/DL (ref 6–8.5)
RBC # BLD AUTO: 3.05 M/UL (ref 3.8–5.2)
SERVICE CMNT-IMP: ABNORMAL
SODIUM SERPL-SCNC: 139 MMOL/L (ref 136–145)
STRESS BASELINE DIAS BP: 81 MMHG
STRESS BASELINE HR: 89 BPM
STRESS BASELINE SYS BP: 164 MMHG
STRESS ESTIMATED WORKLOAD: 1 METS
STRESS EXERCISE DUR MIN: NORMAL
STRESS PEAK DIAS BP: 81 MMHG
STRESS PEAK SYS BP: 164 MMHG
STRESS PERCENT HR ACHIEVED: 66 %
STRESS POST PEAK HR: 101 BPM
STRESS RATE PRESSURE PRODUCT: NORMAL BPM*MMHG
STRESS ST DEPRESSION: 0 MM
STRESS ST ELEVATION: 0 MM
STRESS STAGE 1 DURATION: 1 MIN:SEC
STRESS STAGE 1 HR: 100 BPM
STRESS STAGE 2 BP: NORMAL MMHG
STRESS STAGE 2 DURATION: 1 MIN:SEC
STRESS STAGE 2 HR: 101 BPM
STRESS STAGE 3 DURATION: 1 MIN:SEC
STRESS STAGE 3 HR: 97 BPM
STRESS STAGE RECOVERY 1 DURATION: 1 MIN:SEC
STRESS STAGE RECOVERY 1 HR: 97 BPM
STRESS STAGE RECOVERY 2 BP: NORMAL MMHG
STRESS STAGE RECOVERY 2 DURATION: 1 MIN:SEC
STRESS STAGE RECOVERY 2 HR: 98 BPM
STRESS STAGE RECOVERY 3 DURATION: 1 MIN:SEC
STRESS STAGE RECOVERY 3 HR: 96 BPM
STRESS TARGET HR: 153 BPM
WBC # BLD AUTO: 7.6 K/UL (ref 3.6–11)

## 2021-12-06 PROCEDURE — 74011250636 HC RX REV CODE- 250/636: Performed by: INTERNAL MEDICINE

## 2021-12-06 PROCEDURE — 65660000001 HC RM ICU INTERMED STEPDOWN

## 2021-12-06 PROCEDURE — 74011250637 HC RX REV CODE- 250/637: Performed by: INTERNAL MEDICINE

## 2021-12-06 PROCEDURE — 74011250637 HC RX REV CODE- 250/637: Performed by: HOSPITALIST

## 2021-12-06 PROCEDURE — 94760 N-INVAS EAR/PLS OXIMETRY 1: CPT

## 2021-12-06 PROCEDURE — 94640 AIRWAY INHALATION TREATMENT: CPT

## 2021-12-06 PROCEDURE — 36415 COLL VENOUS BLD VENIPUNCTURE: CPT

## 2021-12-06 PROCEDURE — 85027 COMPLETE CBC AUTOMATED: CPT

## 2021-12-06 PROCEDURE — 80048 BASIC METABOLIC PNL TOTAL CA: CPT

## 2021-12-06 PROCEDURE — 74011636637 HC RX REV CODE- 636/637: Performed by: HOSPITALIST

## 2021-12-06 PROCEDURE — A9500 TC99M SESTAMIBI: HCPCS

## 2021-12-06 PROCEDURE — 94660 CPAP INITIATION&MGMT: CPT

## 2021-12-06 PROCEDURE — 90935 HEMODIALYSIS ONE EVALUATION: CPT

## 2021-12-06 PROCEDURE — 74011250637 HC RX REV CODE- 250/637: Performed by: FAMILY MEDICINE

## 2021-12-06 PROCEDURE — 74011000250 HC RX REV CODE- 250: Performed by: HOSPITALIST

## 2021-12-06 PROCEDURE — 78452 HT MUSCLE IMAGE SPECT MULT: CPT

## 2021-12-06 PROCEDURE — 82962 GLUCOSE BLOOD TEST: CPT

## 2021-12-06 RX ORDER — TETRAKIS(2-METHOXYISOBUTYLISOCYANIDE)COPPER(I) TETRAFLUOROBORATE 1 MG/ML
32 INJECTION, POWDER, LYOPHILIZED, FOR SOLUTION INTRAVENOUS
Status: COMPLETED | OUTPATIENT
Start: 2021-12-06 | End: 2021-12-06

## 2021-12-06 RX ORDER — SODIUM CHLORIDE 0.9 % (FLUSH) 0.9 %
20 SYRINGE (ML) INJECTION
Status: COMPLETED | OUTPATIENT
Start: 2021-12-06 | End: 2021-12-06

## 2021-12-06 RX ORDER — TETRAKIS(2-METHOXYISOBUTYLISOCYANIDE)COPPER(I) TETRAFLUOROBORATE 1 MG/ML
10.8 INJECTION, POWDER, LYOPHILIZED, FOR SOLUTION INTRAVENOUS
Status: COMPLETED | OUTPATIENT
Start: 2021-12-06 | End: 2021-12-06

## 2021-12-06 RX ADMIN — TETRAKIS(2-METHOXYISOBUTYLISOCYANIDE)COPPER(I) TETRAFLUOROBORATE 10.8 MILLICURIE: 1 INJECTION, POWDER, LYOPHILIZED, FOR SOLUTION INTRAVENOUS at 12:30

## 2021-12-06 RX ADMIN — ARFORMOTEROL TARTRATE: 15 SOLUTION RESPIRATORY (INHALATION) at 08:29

## 2021-12-06 RX ADMIN — ROSUVASTATIN 10 MG: 10 TABLET, FILM COATED ORAL at 21:04

## 2021-12-06 RX ADMIN — INSULIN LISPRO 2 UNITS: 100 INJECTION, SOLUTION INTRAVENOUS; SUBCUTANEOUS at 16:57

## 2021-12-06 RX ADMIN — METOPROLOL SUCCINATE 100 MG: 50 TABLET, EXTENDED RELEASE ORAL at 12:05

## 2021-12-06 RX ADMIN — OXYCODONE AND ACETAMINOPHEN 1 TABLET: 5; 325 TABLET ORAL at 21:03

## 2021-12-06 RX ADMIN — ASPIRIN 325 MG ORAL TABLET 325 MG: 325 PILL ORAL at 12:05

## 2021-12-06 RX ADMIN — HYDRALAZINE HYDROCHLORIDE 50 MG: 50 TABLET, FILM COATED ORAL at 21:04

## 2021-12-06 RX ADMIN — EPOETIN ALFA-EPBX 20000 UNITS: 20000 INJECTION, SOLUTION INTRAVENOUS; SUBCUTANEOUS at 21:03

## 2021-12-06 RX ADMIN — Medication 20 ML: at 14:25

## 2021-12-06 RX ADMIN — HYDRALAZINE HYDROCHLORIDE 50 MG: 50 TABLET, FILM COATED ORAL at 12:05

## 2021-12-06 RX ADMIN — NIFEDIPINE 60 MG: 30 TABLET, FILM COATED, EXTENDED RELEASE ORAL at 12:11

## 2021-12-06 RX ADMIN — ARFORMOTEROL TARTRATE: 15 SOLUTION RESPIRATORY (INHALATION) at 19:56

## 2021-12-06 RX ADMIN — HYDRALAZINE HYDROCHLORIDE 50 MG: 50 TABLET, FILM COATED ORAL at 16:57

## 2021-12-06 RX ADMIN — TETRAKIS(2-METHOXYISOBUTYLISOCYANIDE)COPPER(I) TETRAFLUOROBORATE 32 MILLICURIE: 1 INJECTION, POWDER, LYOPHILIZED, FOR SOLUTION INTRAVENOUS at 14:35

## 2021-12-06 RX ADMIN — MONTELUKAST 10 MG: 10 TABLET, FILM COATED ORAL at 21:03

## 2021-12-06 RX ADMIN — REGADENOSON 0.4 MG: 0.08 INJECTION, SOLUTION INTRAVENOUS at 14:25

## 2021-12-06 RX ADMIN — INSULIN LISPRO 2 UNITS: 100 INJECTION, SOLUTION INTRAVENOUS; SUBCUTANEOUS at 21:03

## 2021-12-06 NOTE — PROGRESS NOTES
[facility]   [address] [facility], [id number] [place] [address]  Phone: [phone]   [phone]    [email or web address]     Nephrology Progress Note    Patient Name : Jacy Boone      : 1955     MRN : [id number]  Date of Admission : 2021  Date of Servive : 21    CC: Follow up for ARF      Assessment and Plan   BRENNAN on CKD   - progressive CKD vs other   - HELGA trejo placed and started HD on 12/3  - HD now and MWF this week  - PC placement tomorrow  - CM to arrange out pt dialysis  @ [address] [facility]   - daily labs for now    Nephrotic Range Proteinuria   - 12 gm  - f/u SPEP, UPEP, PLA2R screens    CKD 4  - progressive CKD - presumed DKD  - baseline Cr 3.9 mg/dl in   - f/b Dr Rian Jurado at [facility]     Anemia in CKD '  - started MADY  - stool occult -ve     Back pain   - ?  R psoas muscle Lipoma     HTN   DM-II, HLD  Obesity      Interval History:  Seen and examined on dialysis. UF as able. BP up. For stress test today. Denies cp, sob, n/v/d at this time. Review of Systems: A comprehensive review of systems was negative except for that written in the HPI.     Current Medications:   Current Facility-Administered Medications   Medication Dose Route Frequency    regadenoson (LEXISCAN) injection 0.4 mg  0.4 mg IntraVENous RAD ONCE    sodium chloride (NS) flush 20 mL  20 mL InterCATHeter RAD ONCE    epoetin joni-epbx (RETACRIT) injection 20,000 Units  20,000 Units SubCUTAneous Q MON, WED & FRI    hydrALAZINE (APRESOLINE) tablet 50 mg  50 mg Oral TID    rosuvastatin (CRESTOR) tablet 10 mg  10 mg Oral QHS    0.9% sodium chloride infusion 250 mL  250 mL IntraVENous PRN    0.9% sodium chloride infusion 250 mL  250 mL IntraVENous PRN    oxyCODONE-acetaminophen (PERCOCET) 5-325 mg per tablet 1 Tablet  1 Tablet Oral Q4H PRN    morphine injection 2 mg  2 mg IntraVENous Q4H PRN    heparin (porcine) 1,000 unit/mL injection 1,100 Units  1,100 Units Hemodialysis DIALYSIS PRN    heparin (porcine) 1,000 unit/mL injection 1,400 Units  1,400 Units Hemodialysis DIALYSIS PRN    0.9% sodium chloride infusion 250 mL  250 mL IntraVENous PRN    sodium chloride (NS) flush 5-40 mL  5-40 mL IntraVENous Q8H    sodium chloride (NS) flush 5-40 mL  5-40 mL IntraVENous PRN    acetaminophen (TYLENOL) tablet 650 mg  650 mg Oral Q6H PRN    Or    acetaminophen (TYLENOL) suppository 650 mg  650 mg Rectal Q6H PRN    polyethylene glycol (MIRALAX) packet 17 g  17 g Oral DAILY PRN    ondansetron (ZOFRAN ODT) tablet 4 mg  4 mg Oral Q8H PRN    Or    ondansetron (ZOFRAN) injection 4 mg  4 mg IntraVENous Q6H PRN    albuterol-ipratropium (DUO-NEB) 2.5 MG-0.5 MG/3 ML  3 mL Nebulization Q4H PRN    aspirin tablet 325 mg  325 mg Oral DAILY    metoprolol succinate (TOPROL-XL) XL tablet 100 mg  100 mg Oral DAILY    NIFEdipine ER (PROCARDIA XL) tablet 60 mg  60 mg Oral DAILY    montelukast (SINGULAIR) tablet 10 mg  10 mg Oral QHS    arformoterol 15 mcg/budesonide 0.25 mg neb solution   Nebulization BID RT    insulin lispro (HUMALOG) injection   SubCUTAneous AC&HS    glucose chewable tablet 16 g  4 Tablet Oral PRN    dextrose (D50W) injection syrg 12.5-25 g  12.5-25 g IntraVENous PRN    glucagon (GLUCAGEN) injection 1 mg  1 mg IntraMUSCular PRN      Allergies   Allergen Reactions    Ivp Dye [Fd And C Blue No.1] Hives       Objective:  Vitals:    Vitals:    12/06/21 0900 12/06/21 0915 12/06/21 0930 12/06/21 0946   BP: (!) 196/84 (!) 191/97 (!) 208/85 (!) 164/76   Pulse: 88 96 81 82   Resp: 15 12 12 16   Temp:       TempSrc:       SpO2:       Weight:         Intake and Output:  No intake/output data recorded.   12/04 1901 - 12/06 0700  In: -   Out: 950 [Urine:950]    Physical Examination:        General: Obese   Neck:  RIJ maya +  Resp:  Diminished at bases   CV:  RRR,  no murmur or rub, 1+ LE edema  GI:  Soft, NT, + BS, no HS megaly  Neurologic:  Non focal  Psych:             AAO x 3 appropriate affect   Skin:  No Rash      []    High complexity decision making was performed  []    Patient is at high-risk of decompensation with multiple organ involvement    Lab Data Personally Reviewed: I have reviewed all the pertinent labs, microbiology data and radiology studies during assessment.     Recent Labs     12/06/21 0216 12/05/21 0211 12/04/21  0154    137 136   K 3.8 3.8 3.6    105 105   CO2 25 25 22   * 208* 113*   BUN 33* 26* 39*   CREA 4.61* 3.74* 4.86*   CA 7.8* 8.0* 8.3*     Recent Labs     12/06/21 0216 12/05/21 0211 12/04/21  0154 12/03/21  1536   WBC 7.6 7.4 8.0  --    HGB 9.2* 8.7* 8.9* 8.6*   HCT 28.8* 27.3* 27.8* 27.6*    310 323  --      No results found for: [facility]  Lab Results   Component Value Date/Time    Culture result: NO GROWTH 5 DAYS 03/15/2018 06:09 PM     Recent Results (from the past 24 hour(s))   GLUCOSE, POC    Collection Time: 12/05/21 11:51 AM   Result Value Ref Range    Glucose (POC) 194 (H) 65 - 117 mg/dL    Performed by Shanice Mendoza    GLUCOSE, POC    Collection Time: 12/05/21  4:58 PM   Result Value Ref Range    Glucose (POC) 197 (H) 65 - 117 mg/dL    Performed by Shanice Mendoza    GLUCOSE, POC    Collection Time: 12/05/21 11:02 PM   Result Value Ref Range    Glucose (POC) 288 (H) 65 - 117 mg/dL    Performed by Solis Shaver RN    CBC W/O DIFF    Collection Time: 12/06/21  2:16 AM   Result Value Ref Range    WBC 7.6 3.6 - 11.0 K/uL    RBC 3.05 (L) 3.80 - 5.20 M/uL    HGB 9.2 (L) 11.5 - 16.0 g/dL    HCT 28.8 (L) 35.0 - 47.0 %    MCV 94.4 80.0 - 99.0 FL    MCH 30.2 26.0 - 34.0 PG    MCHC 31.9 30.0 - 36.5 g/dL    RDW 15.2 (H) 11.5 - 14.5 %    PLATELET 432 833 - 337 K/uL    MPV 9.2 8.9 - 12.9 FL    NRBC 0.0 0  WBC    ABSOLUTE NRBC 0.00 0.00 - 4.92 K/uL   METABOLIC PANEL, BASIC    Collection Time: 12/06/21  2:16 AM   Result Value Ref Range    Sodium 139 136 - 145 mmol/L    Potassium 3.8 3.5 - 5.1 mmol/L    Chloride 106 97 - 108 mmol/L    CO2 25 21 - 32 mmol/L    Anion gap 8 5 - 15 mmol/L    Glucose 134 (H) 65 - 100 mg/dL    BUN 33 (H) 6 - 20 MG/DL    Creatinine 4.61 (H) 0.55 - 1.02 MG/DL    BUN/Creatinine ratio 7 (L) 12 - 20      GFR est AA 11 (L) >60 ml/min/1.73m2    GFR est non-AA 9 (L) >60 ml/min/1.73m2    Calcium 7.8 (L) 8.5 - 10.1 MG/DL   GLUCOSE, POC    Collection Time: 12/06/21  5:57 AM   Result Value Ref Range    Glucose (POC) 130 (H) 65 - 117 mg/dL    Performed by Lara Sorenson RN    NUCLEAR CARDIAC STRESS TEST    Collection Time: 12/06/21  8:39 AM   Result Value Ref Range    Target  bpm           I have reviewed the flowsheets. Chart and Pertinent Notes have been reviewed. No change in PMH ,family and social history from Consult note.       Ana Luisa Borges MD  [facility]

## 2021-12-06 NOTE — PROGRESS NOTES
[address] [facility]  Hospitalist Group                                                                                          Hospitalist Progress Note  Dorothy Cavlillo MD  Answering service: [phone] OR [phone] from in house phone        Date of Service:  2021  NAME:  Karyn Amanda  :  1955  MRN:  [id number]      Admission Summary:   78 y.o lady w/ CAD, CHF, COPD, DM, HTN, asthma, CKD IV, who presents with shortness of breath. Symptoms began at least a week ago. She describes progressive dyspnea worsened with exertion. She denies associated chest pain, fever, n/v/d. She does have a mildly productive cough. Interval history / Subjective:   Patient reports less shortness of breath  Severely elevated creatinine needing dialysis  Cont BiPAP PRN, added hydralazine due to elevated BP     Assessment & Plan:     Shortness of breath: this is likely due to pulmonary edema / volume overload in the setting of CKD and CHF. Symptomatic anemia may be contributing. Now off BiPAP - she is not hypoxic but this was placed due to work of breathing     BRENNAN on CKD IV:  -nephrology consulted and orders/recs noted  maya catheter placed and started hemodialysis 12/3/21     Anemia: no evidence of bleeding.  Stool is negative for occult blood.   -check iron studies, b12/folate, retics, haptoglobin  -pt transfused 2 units PRBCs     Elevated serum troponin: with EKG changes- consult cardiology for eval and tx recs  Plans for stress test on monday     Asymmetric LE edema: pt states this is chronic  - RLE doppler neg for DVT     Type 2 DM:  -SSI/POC checks     HTN:  -continue metoprolol, nifedipine, added hydralazine     COPD/Asthma: no evidence of bronchospasm  Resume home meds  History of tobacco use, quit 1 month ago    Resp therapy noted evidence of sleep apnea this am- patient will need formal sleep study after DC and likely needs a CPAP machine    Code status: Full Code  DVT prophylaxis: SCDs    Care Plan discussed with: Patient/Family  Anticipated Disposition: Home w/Family  Anticipated Discharge: Greater than 48 hours     Hospital Problems  Date Reviewed: 4/25/2019          Codes Class Noted POA    Acute renal failure (ARF) ([facility] ICD-10-CM: N17.9  ICD-9-CM: 584.9  12/2/2021 Unknown                Review of Systems:   A comprehensive review of systems was negative except for that written in the HPI. Vital Signs:    Last 24hrs VS reviewed since prior progress note.  Most recent are:  Visit Vitals  BP (!) 184/87 (BP 1 Location: Right upper arm, BP Patient Position: At rest)   Pulse 90   Temp 98.6 °F (37 °C)   Resp 18   Wt 98.5 kg (217 lb 2.5 oz)   SpO2 98%   BMI 37.27 kg/m²     Patient Vitals for the past 24 hrs:   Temp Pulse Resp BP SpO2   12/05/21 2325 98.6 °F (37 °C) -- 18 (!) 184/87 98 %   12/05/21 2249 -- 90 -- (!) 209/76 --   12/05/21 2203 -- 88 -- -- --   12/05/21 2106 -- -- -- -- 99 %   12/05/21 2043 -- -- -- -- 92 %   12/05/21 1959 -- 87 -- -- --   12/05/21 1951 98.7 °F (37.1 °C) 89 18 (!) 159/77 90 %   12/05/21 1900 -- 84 -- -- --   12/05/21 1658 98.5 °F (36.9 °C) 89 20 (!) 171/78 98 %   12/05/21 1400 -- 85 -- -- --   12/05/21 1200 -- 98 -- -- --   12/05/21 1145 98.4 °F (36.9 °C) 98 22 (!) 222/94 90 %   12/05/21 1000 -- 88 -- -- --   12/05/21 0824 98.4 °F (36.9 °C) 92 22 (!) 177/82 98 %   12/05/21 0727 -- -- -- -- 99 %   12/05/21 0554 -- 94 -- -- --   12/05/21 0359 -- 91 -- -- --   12/05/21 0316 98.6 °F (37 °C) 91 13 (!) 163/78 98 %   12/05/21 0152 -- 96 -- -- --   12/05/21 0032 99.1 °F (37.3 °C) 95 13 (!) 156/62 95 %       Intake/Output Summary (Last 24 hours) at 12/5/2021 2353  Last data filed at 12/5/2021 0629  Gross per 24 hour   Intake --   Output 600 ml   Net -600 ml        Physical Examination:     I had a face to face encounter with this patient and independently examined them on 12/5/2021 as outlined below:          Constitutional:  No acute distress, cooperative, pleasant    ENT:  Oral mucosa dry.    Resp:  decreased breath sounds bilaterally. No wheezing/   CV:  Regular rhythm, normal rate, 3/6 SE murmurs,    GI:  Soft, non distended, non tender. Musculoskeletal:  No edema, warm, 2+ pulses throughout    Neurologic:  Moves all extremities. AAOx3, CN II-XII reviewed            Data Review:    Review and/or order of clinical lab test  Review and/or order of tests in the radiology section of [facility]  Review and/or order of tests in the medicine section of [facility]      Labs:     Recent Labs     12/05/21 0211 12/04/21 0154   WBC 7.4 8.0   HGB 8.7* 8.9*   HCT 27.3* 27.8*    323     Recent Labs     12/05/21 0211 12/04/21 0154 12/03/21 0359    136 142   K 3.8 3.6 3.8    105 112*   CO2 25 22 21   BUN 26* 39* 60*   CREA 3.74* 4.86* 6.63*   * 113* 65   CA 8.0* 8.3* 7.4*   MG  --   --  2.0     No results for input(s): ALT, AP, TBIL, TBILI, TP, ALB, GLOB, GGT, AML, LPSE in the last 72 hours. No lab exists for component: SGOT, GPT, AMYP, HLPSE  No results for input(s): INR, PTP, APTT, INREXT, INREXT in the last 72 hours. Recent Labs     12/05/21 0211 12/03/21 0359   TIBC 210* 224*   PSAT 19* 15*      Lab Results   Component Value Date/Time    Folate 18.1 12/02/2021 08:16 PM      No results for input(s): PH, PCO2, PO2 in the last 72 hours. No results for input(s): CPK, CKNDX, TROIQ in the last 72 hours.     No lab exists for component: CPKMB  Lab Results   Component Value Date/Time    Cholesterol, total 260 (H) 07/02/2018 04:27 AM    HDL Cholesterol 31 07/02/2018 04:27 AM    LDL, calculated 197.2 (H) 07/02/2018 04:27 AM    Triglyceride 159 (H) 07/02/2018 04:27 AM    CHOL/HDL Ratio 8.4 (H) 07/02/2018 04:27 AM     Lab Results   Component Value Date/Time    Glucose (POC) 288 (H) 12/05/2021 11:02 PM    Glucose (POC) 197 (H) 12/05/2021 04:58 PM    Glucose (POC) 194 (H) 12/05/2021 11:51 AM    Glucose (POC) 233 (H) 12/05/2021 05:58 AM    Glucose (POC) 220 (H) 12/05/2021 05:27 AM     Lab Results   Component Value Date/Time    Color YELLOW/STRAW 12/02/2021 11:00 PM    Appearance CLEAR 12/02/2021 11:00 PM    Specific gravity 1.025 12/02/2021 11:00 PM    Specific gravity 1.010 06/08/2011 12:00 AM    pH (UA) 5.5 12/02/2021 11:00 PM    Protein 300 (A) 12/02/2021 11:00 PM    Glucose 500 (A) 12/02/2021 11:00 PM    Ketone Negative 12/02/2021 11:00 PM    Bilirubin Negative 12/02/2021 11:00 PM    Urobilinogen 1.0 12/02/2021 11:00 PM    Nitrites Negative 12/02/2021 11:00 PM    Leukocyte Esterase Negative 12/02/2021 11:00 PM    Epithelial cells FEW 12/02/2021 11:00 PM    Bacteria Negative 12/02/2021 11:00 PM    WBC 0-4 12/02/2021 11:00 PM    RBC 0-5 12/02/2021 11:00 PM         Medications Reviewed:     Current Facility-Administered Medications   Medication Dose Route Frequency    [START ON 12/6/2021] epoetin joni-epbx (RETACRIT) injection 20,000 Units  20,000 Units SubCUTAneous Q MON, WED & FRI    [START ON 12/6/2021] hydrALAZINE (APRESOLINE) tablet 50 mg  50 mg Oral TID    rosuvastatin (CRESTOR) tablet 10 mg  10 mg Oral QHS    0.9% sodium chloride infusion 250 mL  250 mL IntraVENous PRN    0.9% sodium chloride infusion 250 mL  250 mL IntraVENous PRN    oxyCODONE-acetaminophen (PERCOCET) 5-325 mg per tablet 1 Tablet  1 Tablet Oral Q4H PRN    morphine injection 2 mg  2 mg IntraVENous Q4H PRN    heparin (porcine) 1,000 unit/mL injection 1,100 Units  1,100 Units Hemodialysis DIALYSIS PRN    heparin (porcine) 1,000 unit/mL injection 1,400 Units  1,400 Units Hemodialysis DIALYSIS PRN    0.9% sodium chloride infusion 250 mL  250 mL IntraVENous PRN    sodium chloride (NS) flush 5-40 mL  5-40 mL IntraVENous Q8H    sodium chloride (NS) flush 5-40 mL  5-40 mL IntraVENous PRN    acetaminophen (TYLENOL) tablet 650 mg  650 mg Oral Q6H PRN    Or    acetaminophen (TYLENOL) suppository 650 mg  650 mg Rectal Q6H PRN    polyethylene glycol (MIRALAX) packet 17 g  17 g Oral DAILY PRN    ondansetron (ZOFRAN ODT) tablet 4 mg  4 mg Oral Q8H PRN    Or    ondansetron (ZOFRAN) injection 4 mg  4 mg IntraVENous Q6H PRN    albuterol-ipratropium (DUO-NEB) 2.5 MG-0.5 MG/3 ML  3 mL Nebulization Q4H PRN    aspirin tablet 325 mg  325 mg Oral DAILY    metoprolol succinate (TOPROL-XL) XL tablet 100 mg  100 mg Oral DAILY    NIFEdipine ER (PROCARDIA XL) tablet 60 mg  60 mg Oral DAILY    montelukast (SINGULAIR) tablet 10 mg  10 mg Oral QHS    arformoterol 15 mcg/budesonide 0.25 mg neb solution   Nebulization BID RT    insulin lispro (HUMALOG) injection   SubCUTAneous AC&HS    glucose chewable tablet 16 g  4 Tablet Oral PRN    dextrose (D50W) injection syrg 12.5-25 g  12.5-25 g IntraVENous PRN    glucagon (GLUCAGEN) injection 1 mg  1 mg IntraMUSCular PRN     ______________________________________________________________________  EXPECTED LENGTH OF STAY: 3d 19h  ACTUAL LENGTH OF STAY:          3                 Bandar Go MD

## 2021-12-06 NOTE — PROGRESS NOTES
Problem: Discharge Planning  Goal: *Discharge to safe environment  Outcome: Progressing Towards Goal  Goal: *Knowledge of medication management  Outcome: Progressing Towards Goal  Goal: *Knowledge of discharge instructions  Outcome: Progressing Towards Goal     Problem: Patient Education: Go to Patient Education Activity  Goal: Patient/Family Education  Outcome: Progressing Towards Goal     Problem: Falls - Risk of  Goal: *Absence of Falls  Description: Document Juanita Marr Fall Risk and appropriate interventions in the flowsheet.   Outcome: Progressing Towards Goal  Note: Fall Risk Interventions:  Mobility Interventions: Patient to call before getting OOB         Medication Interventions: Patient to call before getting OOB    Elimination Interventions: Bed/chair exit alarm              Problem: Patient Education: Go to Patient Education Activity  Goal: Patient/Family Education  Outcome: Progressing Towards Goal     Problem: Breathing Pattern - Ineffective  Goal: *Absence of hypoxia  Outcome: Progressing Towards Goal

## 2021-12-06 NOTE — PROCEDURES
Hemodialysis / [phone]    Vitals Pre Post Assessment Pre Post   BP BP: (!) 173/90 (12/06/21 0820) 217/84 LOC A&OX4 A&OX4   HR 86 95 Lungs diminished diminished   Resp 18 22 Cardiac NSR NSR   Temp 98.0 99.3 Skin Warm and dry Warm and dry   Weight Pre-Dialysis Weight: 98.5 kg (217 lb 2.5 oz) (12/04/21 0900)  Edema 1+SHARRI 1+SHARRI   Tele status   Pain Pain Intensity 1: 0 (12/05/21 2325) 1:0     Orders   Duration: Start: 0820 End: 1130 Total: 3 hrs 10 mins   Dialyzer: Dialyzer/Set Up Inspection: [place] Asa (12/06/21 0820)   K Bath: Dialysate K (mEq/L): 3.5 (12/06/21 0820)   Ca Bath: Dialysate CA (mEq/L): 2.5 (12/06/21 0820)   Na: Dialysate NA (mEq/L): 140 (12/06/21 0820)   Bicarb: Dialysate HCO3 (mEq/L): 35 (12/06/21 0820)   Target Fluid Removal: Goal/Amount of Fluid to Remove (mL): 2000 mL (12/06/21 0820)     Access   Type & Location: HELGA trejo. Each catheter limb disinfected per p&p, caps removed, hubs disinfected per p&p. Each dialysis catheter limb disinfected per p&p, blood returned per p&p, each dialysis hub disinfected per p&p, post dialysis catheter dwell instilled per order, and caps applied. Flow was not great, BFR set at 250.    Comments:                                        Labs   HBsAg (Antigen) / date:   Antigen neg 12-3-21                                            HBsAb (Antibody) / date: Susceptible 12-3-21   Source:    Obtained/Reviewed  Critical Results Called HGB   Date Value Ref Range Status   12/06/2021 9.2 (L) 11.5 - 16.0 g/dL Final     Potassium   Date Value Ref Range Status   12/06/2021 3.8 3.5 - 5.1 mmol/L Final     Calcium   Date Value Ref Range Status   12/06/2021 7.8 (L) 8.5 - 10.1 MG/DL Final     BUN   Date Value Ref Range Status   12/06/2021 33 (H) 6 - 20 MG/DL Final     Creatinine   Date Value Ref Range Status   12/06/2021 4.61 (H) 0.55 - 1.02 MG/DL Final     Comment:     INVESTIGATED PER DELTA CHECK PROTOCOL        Meds Given   Name Dose Route   none                 Adequacy / Fluid    Total Liters Process: 44.5   Net Fluid Removed: 1000 ml      Comments   Time Out Done:   (Time) Yes, 0820   Admitting Diagnosis:    Consent obtained/signed: Informed Consent Verified: Yes (12/06/21 0820)   Machine / RO # Machine Number: [id number] (12/06/21 0820)   Primary Nurse Rpt Pre: Joceline Vazquez RN   Primary Nurse Rpt Post: Joceline Vazquez RN   Pt Education: Access, procedural   Care Plan: Routine HD   Pts outpatient clinic: n/a     Tx Summary   Comments: Patients catheter with poor flow so BFR set at 250. Also off early so she can go to cardiac stress test. Dr Amadou Villalobos aware and agrees. Her BP dropped from  160s to 120s within 15 minutes so goal decreased to 1000 ml net. High BPs post tx . Report to Joceline Vazquez RN using [employer].

## 2021-12-06 NOTE — CARDIO/PULMONARY
Cardiac Rehab: MI education folder to bedside of Jacki Romero. Visited to discuss the MI diagnosis which Ami Watt stated \" the dr did not tell me that\", it was my kidney issue\". Discussed the purpose of the NST and echo to check for a definitive diagnosis that will assist the physicians with the plan of care. Educated using teach back method. Reviewed MI diagnosis definition and purpose of intervention. .  Emphasized the value of cardiac rehab. Discussed Cardiac Rehab Program format, benefits, and encouraged enrollment to assist with risk modification and management. Will follow for NST and echo results and final treatment plan. Tao Brochovi Romero verbalized understanding with questions answered.   Rafita Jones, MARIAN

## 2021-12-06 NOTE — PROGRESS NOTES
Problem: Discharge Planning  Goal: *Discharge to safe environment  Outcome: Progressing Towards Goal  Goal: *Knowledge of medication management  Outcome: Progressing Towards Goal  Goal: *Knowledge of discharge instructions  Outcome: Progressing Towards Goal     Problem: Patient Education: Go to Patient Education Activity  Goal: Patient/Family Education  Outcome: Progressing Towards Goal     Problem: Falls - Risk of  Goal: *Absence of Falls  Description: Document Corina Minium Fall Risk and appropriate interventions in the flowsheet.   Outcome: Progressing Towards Goal  Note: Fall Risk Interventions:  Mobility Interventions: Patient to call before getting OOB         Medication Interventions: Patient to call before getting OOB    Elimination Interventions: Bed/chair exit alarm

## 2021-12-06 NOTE — PROGRESS NOTES
Bedside shift change report given to [address] [place] [address] (oncoming nurse) by Kimberly Cuevas RN (offgoing nurse). Report included the following information SBAR, Intake/Output, MAR, Med Rec Status and Cardiac Rhythm nsr.

## 2021-12-07 ENCOUNTER — APPOINTMENT (OUTPATIENT)
Dept: NON INVASIVE DIAGNOSTICS | Age: 66
DRG: 674 | End: 2021-12-07
Attending: INTERNAL MEDICINE
Payer: MEDICARE

## 2021-12-07 ENCOUNTER — HOSPITAL ENCOUNTER (OUTPATIENT)
Dept: INTERVENTIONAL RADIOLOGY/VASCULAR | Age: 66
Discharge: HOME | DRG: 674 | End: 2021-12-07
Attending: INTERNAL MEDICINE
Payer: MEDICARE

## 2021-12-07 LAB
ALBUMIN SERPL-MCNC: 1.6 G/DL (ref 3.5–5)
ANION GAP SERPL CALC-SCNC: 5 MMOL/L (ref 5–15)
ANION GAP SERPL CALC-SCNC: 7 MMOL/L (ref 5–15)
BUN SERPL-MCNC: 21 MG/DL (ref 6–20)
BUN SERPL-MCNC: 21 MG/DL (ref 6–20)
BUN/CREAT SERPL: 6 (ref 12–20)
BUN/CREAT SERPL: 6 (ref 12–20)
CALCIUM SERPL-MCNC: 8.1 MG/DL (ref 8.5–10.1)
CALCIUM SERPL-MCNC: 8.1 MG/DL (ref 8.5–10.1)
CHLORIDE SERPL-SCNC: 105 MMOL/L (ref 97–108)
CHLORIDE SERPL-SCNC: 106 MMOL/L (ref 97–108)
CO2 SERPL-SCNC: 25 MMOL/L (ref 21–32)
CO2 SERPL-SCNC: 26 MMOL/L (ref 21–32)
CREAT SERPL-MCNC: 3.58 MG/DL (ref 0.55–1.02)
CREAT SERPL-MCNC: 3.61 MG/DL (ref 0.55–1.02)
ERYTHROCYTE [DISTWIDTH] IN BLOOD BY AUTOMATED COUNT: 15.1 % (ref 11.5–14.5)
GLUCOSE BLD STRIP.AUTO-MCNC: 153 MG/DL (ref 65–117)
GLUCOSE BLD STRIP.AUTO-MCNC: 185 MG/DL (ref 65–117)
GLUCOSE BLD STRIP.AUTO-MCNC: 193 MG/DL (ref 65–117)
GLUCOSE BLD STRIP.AUTO-MCNC: 204 MG/DL (ref 65–117)
GLUCOSE SERPL-MCNC: 165 MG/DL (ref 65–100)
GLUCOSE SERPL-MCNC: 169 MG/DL (ref 65–100)
HCT VFR BLD AUTO: 27.6 % (ref 35–47)
HGB BLD-MCNC: 8.9 G/DL (ref 11.5–16)
MCH RBC QN AUTO: 30 PG (ref 26–34)
MCHC RBC AUTO-ENTMCNC: 32.2 G/DL (ref 30–36.5)
MCV RBC AUTO: 92.9 FL (ref 80–99)
NRBC # BLD: 0 K/UL (ref 0–0.01)
NRBC BLD-RTO: 0 PER 100 WBC
PHOSPHATE SERPL-MCNC: 3.5 MG/DL (ref 2.6–4.7)
PLATELET # BLD AUTO: 294 K/UL (ref 150–400)
PMV BLD AUTO: 9.2 FL (ref 8.9–12.9)
POTASSIUM SERPL-SCNC: 5.8 MMOL/L (ref 3.5–5.1)
POTASSIUM SERPL-SCNC: ABNORMAL MMOL/L (ref 3.5–5.1)
RBC # BLD AUTO: 2.97 M/UL (ref 3.8–5.2)
SERVICE CMNT-IMP: ABNORMAL
SODIUM SERPL-SCNC: 136 MMOL/L (ref 136–145)
SODIUM SERPL-SCNC: 138 MMOL/L (ref 136–145)
WBC # BLD AUTO: 7.9 K/UL (ref 3.6–11)

## 2021-12-07 PROCEDURE — 36558 INSERT TUNNELED CV CATH: CPT

## 2021-12-07 PROCEDURE — 65660000001 HC RM ICU INTERMED STEPDOWN

## 2021-12-07 PROCEDURE — 74011250636 HC RX REV CODE- 250/636: Performed by: RADIOLOGY

## 2021-12-07 PROCEDURE — 2709999900 HC NON-CHARGEABLE SUPPLY

## 2021-12-07 PROCEDURE — 77030002996 HC SUT SLK J&J -A

## 2021-12-07 PROCEDURE — 94640 AIRWAY INHALATION TREATMENT: CPT

## 2021-12-07 PROCEDURE — 85027 COMPLETE CBC AUTOMATED: CPT

## 2021-12-07 PROCEDURE — 74011636637 HC RX REV CODE- 636/637: Performed by: HOSPITALIST

## 2021-12-07 PROCEDURE — 80048 BASIC METABOLIC PNL TOTAL CA: CPT

## 2021-12-07 PROCEDURE — 74011250637 HC RX REV CODE- 250/637: Performed by: HOSPITALIST

## 2021-12-07 PROCEDURE — 77030010507 HC ADH SKN DERMBND J&J -B

## 2021-12-07 PROCEDURE — 0JH63XZ INSERTION OF TUNNELED VASCULAR ACCESS DEVICE INTO CHEST SUBCUTANEOUS TISSUE AND FASCIA, PERCUTANEOUS APPROACH: ICD-10-PCS | Performed by: RADIOLOGY

## 2021-12-07 PROCEDURE — 74011250637 HC RX REV CODE- 250/637: Performed by: INTERNAL MEDICINE

## 2021-12-07 PROCEDURE — 74011000250 HC RX REV CODE- 250: Performed by: RADIOLOGY

## 2021-12-07 PROCEDURE — 82962 GLUCOSE BLOOD TEST: CPT

## 2021-12-07 PROCEDURE — 94660 CPAP INITIATION&MGMT: CPT

## 2021-12-07 PROCEDURE — C1750 CATH, HEMODIALYSIS,LONG-TERM: HCPCS

## 2021-12-07 PROCEDURE — 74011000258 HC RX REV CODE- 258: Performed by: RADIOLOGY

## 2021-12-07 PROCEDURE — 80069 RENAL FUNCTION PANEL: CPT

## 2021-12-07 PROCEDURE — 36415 COLL VENOUS BLD VENIPUNCTURE: CPT

## 2021-12-07 PROCEDURE — 74011250636 HC RX REV CODE- 250/636: Performed by: FAMILY MEDICINE

## 2021-12-07 PROCEDURE — 02HV33Z INSERTION OF INFUSION DEVICE INTO SUPERIOR VENA CAVA, PERCUTANEOUS APPROACH: ICD-10-PCS | Performed by: RADIOLOGY

## 2021-12-07 PROCEDURE — 74011000250 HC RX REV CODE- 250: Performed by: HOSPITALIST

## 2021-12-07 PROCEDURE — 74011250637 HC RX REV CODE- 250/637: Performed by: FAMILY MEDICINE

## 2021-12-07 RX ORDER — FLUMAZENIL 0.1 MG/ML
0.5 INJECTION INTRAVENOUS
Status: DISCONTINUED | OUTPATIENT
Start: 2021-12-07 | End: 2021-12-07 | Stop reason: HOSPADM

## 2021-12-07 RX ORDER — MIDAZOLAM HYDROCHLORIDE 1 MG/ML
5 INJECTION, SOLUTION INTRAMUSCULAR; INTRAVENOUS
Status: DISCONTINUED | OUTPATIENT
Start: 2021-12-07 | End: 2021-12-07 | Stop reason: HOSPADM

## 2021-12-07 RX ORDER — FENTANYL CITRATE 50 UG/ML
200 INJECTION, SOLUTION INTRAMUSCULAR; INTRAVENOUS
Status: DISCONTINUED | OUTPATIENT
Start: 2021-12-07 | End: 2021-12-07 | Stop reason: HOSPADM

## 2021-12-07 RX ORDER — SODIUM CHLORIDE 9 MG/ML
25 INJECTION, SOLUTION INTRAVENOUS
Status: DISCONTINUED | OUTPATIENT
Start: 2021-12-07 | End: 2021-12-07 | Stop reason: HOSPADM

## 2021-12-07 RX ORDER — HEPARIN SODIUM 1000 [USP'U]/ML
10000 INJECTION, SOLUTION INTRAVENOUS; SUBCUTANEOUS
Status: COMPLETED | OUTPATIENT
Start: 2021-12-07 | End: 2021-12-07

## 2021-12-07 RX ORDER — NALOXONE HYDROCHLORIDE 0.4 MG/ML
0.4 INJECTION, SOLUTION INTRAMUSCULAR; INTRAVENOUS; SUBCUTANEOUS
Status: DISCONTINUED | OUTPATIENT
Start: 2021-12-07 | End: 2021-12-07 | Stop reason: HOSPADM

## 2021-12-07 RX ORDER — LIDOCAINE HYDROCHLORIDE 20 MG/ML
20 INJECTION, SOLUTION INFILTRATION; PERINEURAL
Status: COMPLETED | OUTPATIENT
Start: 2021-12-07 | End: 2021-12-07

## 2021-12-07 RX ORDER — DEXTROMETHORPHAN POLISTIREX 30 MG/5ML
30 SUSPENSION ORAL EVERY 12 HOURS
Status: DISCONTINUED | OUTPATIENT
Start: 2021-12-07 | End: 2021-12-07

## 2021-12-07 RX ADMIN — FENTANYL CITRATE 50 MCG: 50 INJECTION INTRAMUSCULAR; INTRAVENOUS at 16:22

## 2021-12-07 RX ADMIN — INSULIN LISPRO 2 UNITS: 100 INJECTION, SOLUTION INTRAVENOUS; SUBCUTANEOUS at 06:35

## 2021-12-07 RX ADMIN — HEPARIN SODIUM 3800 UNITS: 1000 INJECTION INTRAVENOUS; SUBCUTANEOUS at 16:00

## 2021-12-07 RX ADMIN — MONTELUKAST 10 MG: 10 TABLET, FILM COATED ORAL at 21:44

## 2021-12-07 RX ADMIN — INSULIN LISPRO 2 UNITS: 100 INJECTION, SOLUTION INTRAVENOUS; SUBCUTANEOUS at 17:40

## 2021-12-07 RX ADMIN — ROSUVASTATIN 10 MG: 10 TABLET, FILM COATED ORAL at 21:44

## 2021-12-07 RX ADMIN — SODIUM CHLORIDE 25 ML/HR: 900 INJECTION, SOLUTION INTRAVENOUS at 16:00

## 2021-12-07 RX ADMIN — LIDOCAINE HYDROCHLORIDE 16 ML: 20 INJECTION, SOLUTION INFILTRATION; PERINEURAL at 16:00

## 2021-12-07 RX ADMIN — ARFORMOTEROL TARTRATE: 15 SOLUTION RESPIRATORY (INHALATION) at 19:55

## 2021-12-07 RX ADMIN — MORPHINE SULFATE 2 MG: 2 INJECTION, SOLUTION INTRAMUSCULAR; INTRAVENOUS at 11:29

## 2021-12-07 RX ADMIN — INSULIN LISPRO 2 UNITS: 100 INJECTION, SOLUTION INTRAVENOUS; SUBCUTANEOUS at 11:29

## 2021-12-07 RX ADMIN — ACETAMINOPHEN 650 MG: 325 TABLET ORAL at 21:44

## 2021-12-07 RX ADMIN — INSULIN LISPRO 2 UNITS: 100 INJECTION, SOLUTION INTRAVENOUS; SUBCUTANEOUS at 21:44

## 2021-12-07 RX ADMIN — HYDRALAZINE HYDROCHLORIDE 50 MG: 50 TABLET, FILM COATED ORAL at 17:40

## 2021-12-07 RX ADMIN — WATER 2 G: 1 INJECTION INTRAMUSCULAR; INTRAVENOUS; SUBCUTANEOUS at 16:10

## 2021-12-07 RX ADMIN — MIDAZOLAM HYDROCHLORIDE 1 MG: 1 INJECTION, SOLUTION INTRAMUSCULAR; INTRAVENOUS at 16:22

## 2021-12-07 RX ADMIN — FENTANYL CITRATE 25 MCG: 50 INJECTION INTRAMUSCULAR; INTRAVENOUS at 16:25

## 2021-12-07 RX ADMIN — HYDRALAZINE HYDROCHLORIDE 50 MG: 50 TABLET, FILM COATED ORAL at 21:44

## 2021-12-07 RX ADMIN — ARFORMOTEROL TARTRATE: 15 SOLUTION RESPIRATORY (INHALATION) at 07:45

## 2021-12-07 NOTE — PROGRESS NOTES
Bedside shift change report given to April (oncoming nurse) byKathleen  (offgoing nurse).  Report included the following information SBAR, Kardex, MAR, Med Rec Status and Cardiac Rhythm NSr.

## 2021-12-07 NOTE — PROGRESS NOTES
[facility]   [address] [facility], [id number] [place] [address]  Phone: [phone]   [phone]    [email or web address]     Nephrology Progress Note    Patient Name : Joaquin Jacques      : 1955     MRN : [id number]  Date of Admission : 2021  Date of Servive : 21    CC: Follow up for ARF      Assessment and Plan   BRENNAN on CKD   - progressive CKD vs other   - RIJ maya placed and started HD on 12/3  - HD MWF for now  - PC placement today  - CM to arrange out pt dialysis  @ [id number] [facility]     Hyperkalemia:  - sample hemolyzed  - repeat today    Nephrotic Range Proteinuria   - 12 gms, no M-spike on SPEP    CKD 4  - progressive CKD - presumed DKD  - baseline Cr 3.9 mg/dl in   - f/b Dr Gail Orlando at [facility]     Anemia in CKD   - started MADY    Back pain   - ?  R psoas muscle Lipoma     HTN   DM-II, HLD  Obesity      Interval History:  Seen and examined. Resting in bed. For PC placement today. No cp, sob. Hd ok  Yesterday. K up, sample hemolyzed. Review of Systems: A comprehensive review of systems was negative except for that written in the HPI.     Current Medications:   Current Facility-Administered Medications   Medication Dose Route Frequency    epoetin joni-epbx (RETACRIT) injection 20,000 Units  20,000 Units SubCUTAneous Q MON, WED & FRI    hydrALAZINE (APRESOLINE) tablet 50 mg  50 mg Oral TID    rosuvastatin (CRESTOR) tablet 10 mg  10 mg Oral QHS    0.9% sodium chloride infusion 250 mL  250 mL IntraVENous PRN    0.9% sodium chloride infusion 250 mL  250 mL IntraVENous PRN    oxyCODONE-acetaminophen (PERCOCET) 5-325 mg per tablet 1 Tablet  1 Tablet Oral Q4H PRN    morphine injection 2 mg  2 mg IntraVENous Q4H PRN    heparin (porcine) 1,000 unit/mL injection 1,100 Units  1,100 Units Hemodialysis DIALYSIS PRN    heparin (porcine) 1,000 unit/mL injection 1,400 Units  1,400 Units Hemodialysis DIALYSIS PRN    0.9% sodium chloride infusion 250 mL  250 mL IntraVENous PRN    sodium chloride (NS) flush 5-40 mL  5-40 mL IntraVENous Q8H    sodium chloride (NS) flush 5-40 mL  5-40 mL IntraVENous PRN    acetaminophen (TYLENOL) tablet 650 mg  650 mg Oral Q6H PRN    Or    acetaminophen (TYLENOL) suppository 650 mg  650 mg Rectal Q6H PRN    polyethylene glycol (MIRALAX) packet 17 g  17 g Oral DAILY PRN    ondansetron (ZOFRAN ODT) tablet 4 mg  4 mg Oral Q8H PRN    Or    ondansetron (ZOFRAN) injection 4 mg  4 mg IntraVENous Q6H PRN    albuterol-ipratropium (DUO-NEB) 2.5 MG-0.5 MG/3 ML  3 mL Nebulization Q4H PRN    aspirin tablet 325 mg  325 mg Oral DAILY    metoprolol succinate (TOPROL-XL) XL tablet 100 mg  100 mg Oral DAILY    NIFEdipine ER (PROCARDIA XL) tablet 60 mg  60 mg Oral DAILY    montelukast (SINGULAIR) tablet 10 mg  10 mg Oral QHS    arformoterol 15 mcg/budesonide 0.25 mg neb solution   Nebulization BID RT    insulin lispro (HUMALOG) injection   SubCUTAneous AC&HS    glucose chewable tablet 16 g  4 Tablet Oral PRN    dextrose (D50W) injection syrg 12.5-25 g  12.5-25 g IntraVENous PRN    glucagon (GLUCAGEN) injection 1 mg  1 mg IntraMUSCular PRN      Allergies   Allergen Reactions    Ivp Dye [Fd And C Blue No.1] Hives       Objective:  Vitals:    Vitals:    12/07/21 0400 12/07/21 0600 12/07/21 0747 12/07/21 0822   BP:    (!) 147/64   Pulse: 80 89  81   Resp:    16   Temp:    98.7 °F (37.1 °C)   TempSrc:       SpO2:   100% 96%   Weight:       Height:         Intake and Output:  No intake/output data recorded.   12/05 1901 - 12/07 0700  In: -   Out: 1350 [Urine:350]    Physical Examination:        General: Obese   Neck:  RIJ maya +  Resp:  Diminished at bases   CV:  RRR,  no murmur or rub, 1+ LE edema  GI:  Soft, NT, + BS, no HS megaly  Neurologic:  Non focal  Psych:             AAO x 3 appropriate affect   Skin:  No Rash      []    High complexity decision making was performed  []    Patient is at high-risk of decompensation with multiple organ involvement    Lab Data Personally Reviewed: I have reviewed all the pertinent labs, microbiology data and radiology studies during assessment.     Recent Labs     12/07/21 0231 12/06/21 0216 12/05/21 0211    139 137   K 5.8* 3.8 3.8    106 105   CO2 26 25 25   * 134* 208*   BUN 21* 33* 26*   CREA 3.58* 4.61* 3.74*   CA 8.1* 7.8* 8.0*     Recent Labs     12/07/21 0231 12/06/21 0216 12/05/21 0211   WBC 7.9 7.6 7.4   HGB 8.9* 9.2* 8.7*   HCT 27.6* 28.8* 27.3*    303 310     No results found for: SDES  Lab Results   Component Value Date/Time    Culture result: NO GROWTH 5 DAYS 03/15/2018 06:09 PM     Recent Results (from the past 24 hour(s))   GLUCOSE, POC    Collection Time: 12/06/21 11:06 AM   Result Value Ref Range    Glucose (POC) 121 (H) 65 - 117 mg/dL    Performed by Atilio De Souza STRESS TEST    Collection Time: 12/06/21  3:48 PM   Result Value Ref Range    Target  bpm    Exercise duration time 00:03:00     Stress Base Systolic  mmHg    Stress Base Diastolic BP 81 mmHg    Post peak  BPM    Baseline HR 89 BPM    Estimated workload 1.0 METS    Baseline  mmHg    Percent HR 66 %    Stress Base Diastolic BP 81 mmHg    Stress Rate Pressure Product 16,564 BPM*mmHg    Stress Stage 1 Duration 1 min:sec    Stress Stage 1  bpm    Stress Stage 2 Duration 1 min:sec    Stress Stage 2  bpm    Stress Stage 2 /72 mmHg    Stress Stage 3 Duration 1 min:sec    Stress Stage 3 HR 97 bpm    Recovery Stage 1 Duration 1 min:sec    Recovery Stage 1 HR 97 bpm    Recovery Stage 2 Duration 1 min:sec    Recovery Stage 2 HR 98 bpm    Recovery Stage 2 /75 mmHg    Recovery Stage 3 Duration 1 min:sec    Recovery Stage 3 HR 96 bpm    ST Elevation (mm) 0 mm    ST Depression (mm) 0 mm   GLUCOSE, POC    Collection Time: 12/06/21  4:43 PM   Result Value Ref Range    Glucose (POC) 164 (H) 65 - 117 mg/dL    Performed by Vilma Mobley GLUCOSE, POC    Collection Time: 12/06/21  8:47 PM   Result Value Ref Range    Glucose (POC) 216 (H) 65 - 117 mg/dL    Performed by Dashawn Vazquez RN    CBC W/O DIFF    Collection Time: 12/07/21  2:31 AM   Result Value Ref Range    WBC 7.9 3.6 - 11.0 K/uL    RBC 2.97 (L) 3.80 - 5.20 M/uL    HGB 8.9 (L) 11.5 - 16.0 g/dL    HCT 27.6 (L) 35.0 - 47.0 %    MCV 92.9 80.0 - 99.0 FL    MCH 30.0 26.0 - 34.0 PG    MCHC 32.2 30.0 - 36.5 g/dL    RDW 15.1 (H) 11.5 - 14.5 %    PLATELET 862 498 - 516 K/uL    MPV 9.2 8.9 - 12.9 FL    NRBC 0.0 0  WBC    ABSOLUTE NRBC 0.00 0.00 - 0.06 K/uL   METABOLIC PANEL, BASIC    Collection Time: 12/07/21  2:31 AM   Result Value Ref Range    Sodium 136 136 - 145 mmol/L    Potassium 5.8 (H) 3.5 - 5.1 mmol/L    Chloride 105 97 - 108 mmol/L    CO2 26 21 - 32 mmol/L    Anion gap 5 5 - 15 mmol/L    Glucose 169 (H) 65 - 100 mg/dL    BUN 21 (H) 6 - 20 MG/DL    Creatinine 3.58 (H) 0.55 - 1.02 MG/DL    BUN/Creatinine ratio 6 (L) 12 - 20      GFR est AA 15 (L) >60 ml/min/1.73m2    GFR est non-AA 13 (L) >60 ml/min/1.73m2    Calcium 8.1 (L) 8.5 - 10.1 MG/DL   GLUCOSE, POC    Collection Time: 12/07/21  5:50 AM   Result Value Ref Range    Glucose (POC) 185 (H) 65 - 117 mg/dL    Performed by Dashawn Vazquez RN            I have reviewed the flowsheets. Chart and Pertinent Notes have been reviewed. No change in PMH ,family and social history from Consult note.       Abbie Wilhelm MD  [facility]

## 2021-12-07 NOTE — PROGRESS NOTES
PRIMITIVO: Anticipate discharge home on new dialysis at [facility] location. Transportation likely in car with family. RUR: 11%    Disposition: Patient admitted on 12/2 with acute renal failure. CM consult to set up new dialysis at [facility] for MWF. Permacath to be placed today. Diagnosed with sleep apnea and will need sleep study post discharge. CM submitted referral for dialysis and left a message for [facility] liaison, Christiano Tamez. [phone]  IBAN received a return call from Lisa Ramirez who is now in Jasbir's role with [facility]. She stated she will keep an eye out for patient's information and will contact CM if anything additional is needed.     [facility], [facility],[address]  [phone]

## 2021-12-07 NOTE — PROGRESS NOTES
Problem: Discharge Planning  Goal: *Discharge to safe environment  Outcome: Progressing Towards Goal  Goal: *Knowledge of medication management  Outcome: Progressing Towards Goal  Goal: *Knowledge of discharge instructions  Outcome: Progressing Towards Goal     Problem: Patient Education: Go to Patient Education Activity  Goal: Patient/Family Education  Outcome: Progressing Towards Goal     Problem: Falls - Risk of  Goal: *Absence of Falls  Description: Document Teodora Garcia Fall Risk and appropriate interventions in the flowsheet.   Outcome: Progressing Towards Goal  Note: Fall Risk Interventions:  Mobility Interventions: Patient to call before getting OOB         Medication Interventions: Patient to call before getting OOB    Elimination Interventions: Bed/chair exit alarm              Problem: Patient Education: Go to Patient Education Activity  Goal: Patient/Family Education  Outcome: Progressing Towards Goal     Problem: Breathing Pattern - Ineffective  Goal: *Absence of hypoxia  Outcome: Progressing Towards Goal

## 2021-12-07 NOTE — PROGRESS NOTES
[address] [facility]  Hospitalist Group                                                                                          Hospitalist Progress Note  Jin Mir MD  Answering service: [phone] OR [phone] from in house phone        Date of Service:  2021  NAME:  Vera Banerjee  :  1955  MRN:  [id number]      Admission Summary:   78 y.o lady w/ CAD, CHF, COPD, DM, HTN, asthma, CKD IV, who presents with shortness of breath. Symptoms began at least a week ago. She describes progressive dyspnea worsened with exertion. She denies associated chest pain, fever, n/v/d. She does have a mildly productive cough. Interval history / Subjective:   Patient reports less shortness of breath  Severely elevated creatinine needing dialysis  Cont BiPAP PRN, added hydralazine due to elevated BP     Assessment & Plan:     Shortness of breath: this is likely due to pulmonary edema / volume overload in the setting of CKD and CHF. Symptomatic anemia may be contributing. Now off BiPAP - she is not hypoxic but this was placed due to work of breathing     BRENNAN on CKD IV:  -nephrology consulted and orders/recs noted  maya catheter placed and started hemodialysis 12/3/21     Anemia: no evidence of bleeding.  Stool is negative for occult blood.   -check iron studies, b12/folate, retics, haptoglobin  -pt transfused 2 units PRBCs     Elevated serum troponin: with EKG changes- consult cardiology for eval and tx recs  stress test done today and negative for ischemia     Asymmetric LE edema: pt states this is chronic  - RLE doppler neg for DVT     Type 2 DM:  -SSI/POC checks     HTN:  -continue metoprolol, nifedipine, added hydralazine     COPD/Asthma: no evidence of bronchospasm  Resume home meds  History of tobacco use, quit 1 month ago    Resp therapy noted evidence of sleep apnea - patient will need formal sleep study after DC and likely needs a CPAP machine    Code status: Full Code  DVT prophylaxis: SCDs    Care Plan discussed with: Patient/Family  Anticipated Disposition: Home w/Family  Anticipated Discharge: Greater than 48 hours     Hospital Problems  Date Reviewed: 4/25/2019          Codes Class Noted POA    [facility] ICD-10-CM: N17.9  ICD-9-CM: 584.9  12/2/2021 Unknown                Review of Systems:   A comprehensive review of systems was negative except for that written in the HPI. Vital Signs:    Last 24hrs VS reviewed since prior progress note.  Most recent are:  Visit Vitals  BP (!) 124/105 (BP 1 Location: Right arm, BP Patient Position: At rest)   Pulse 95   Temp 97.6 °F (36.4 °C)   Resp 15   Ht 5' 4\" (1.626 m)   Wt 98 kg (216 lb)   SpO2 96%   BMI 37.08 kg/m²     Patient Vitals for the past 24 hrs:   Temp Pulse Resp BP SpO2   12/06/21 2200 -- 95 -- -- --   12/06/21 2050 -- -- -- -- 96 %   12/06/21 2047 97.6 °F (36.4 °C) 94 15 (!) 124/105 96 %   12/06/21 2000 -- 89 -- -- --   12/06/21 1956 -- -- -- -- 97 %   12/06/21 1836 -- 86 -- -- --   12/06/21 1644 98.2 °F (36.8 °C) 92 15 (!) 134/55 97 %   12/06/21 1424 -- -- -- (!) 164/81 --   12/06/21 1227 -- 84 -- -- --   12/06/21 1129 99.3 °F (37.4 °C) 95 22 (!) 217/84 --   12/06/21 1116 -- 85 16 (!) 159/82 --   12/06/21 1100 -- 90 16 (!) 191/76 --   12/06/21 1046 -- 86 16 (!) 179/82 --   12/06/21 1031 -- 97 16 (!) 149/123 --   12/06/21 1015 -- 87 16 (!) 157/80 --   12/06/21 1014 -- 85 -- -- --   12/06/21 1000 -- 82 16 126/64 --   12/06/21 0946 -- 82 16 (!) 164/76 --   12/06/21 0930 -- 81 12 (!) 208/85 --   12/06/21 0915 -- 96 12 (!) 191/97 --   12/06/21 0900 -- 88 15 (!) 196/84 --   12/06/21 0846 -- 86 15 (!) 202/88 --   12/06/21 0830 -- 87 15 (!) 206/94 100 %   12/06/21 0820 -- 85 18 (!) 173/90 100 %   12/06/21 0555 -- 84 -- -- --   12/06/21 0357 -- 81 -- -- --   12/06/21 0311 98 °F (36.7 °C) 95 18 (!) 175/84 100 %   12/06/21 0209 -- -- -- -- 100 %   12/06/21 0208 -- 94 -- -- --   12/05/21 2325 98.6 °F (37 °C) -- 18 (!) 184/87 98 %       Intake/Output Summary (Last 24 hours) at 12/6/2021 2305  Last data filed at 12/6/2021 1129  Gross per 24 hour   Intake --   Output 1350 ml   Net -1350 ml        Physical Examination:     I had a face to face encounter with this patient and independently examined them on 12/6/2021 as outlined below:          Constitutional:  No acute distress, cooperative, pleasant    ENT:  Oral mucosa dry. Resp:  decreased breath sounds bilaterally. No wheezing/   CV:  Regular rhythm, normal rate, 3/6 SE murmurs,    GI:  Soft, non distended, non tender. Musculoskeletal:  No edema, warm, 2+ pulses throughout    Neurologic:  Moves all extremities. AAOx3, CN II-XII reviewed            Data Review:    Review and/or order of clinical lab test  Review and/or order of tests in the radiology section of CPT  Review and/or order of tests in the medicine section of CPT      Labs:     Recent Labs     12/06/21 0216 12/05/21 0211   WBC 7.6 7.4   HGB 9.2* 8.7*   HCT 28.8* 27.3*    310     Recent Labs     12/06/21 0216 12/05/21 0211 12/04/21  0154    137 136   K 3.8 3.8 3.6    105 105   CO2 25 25 22   BUN 33* 26* 39*   CREA 4.61* 3.74* 4.86*   * 208* 113*   CA 7.8* 8.0* 8.3*     No results for input(s): ALT, AP, TBIL, TBILI, TP, ALB, GLOB, GGT, AML, LPSE in the last 72 hours. No lab exists for component: SGOT, GPT, AMYP, HLPSE  No results for input(s): INR, PTP, APTT, INREXT, INREXT in the last 72 hours. Recent Labs     12/05/21 0211   TIBC 210*   PSAT 19*      Lab Results   Component Value Date/Time    Folate 18.1 12/02/2021 08:16 PM      No results for input(s): PH, PCO2, PO2 in the last 72 hours. No results for input(s): CPK, CKNDX, TROIQ in the last 72 hours.     No lab exists for component: CPKMB  Lab Results   Component Value Date/Time    Cholesterol, total 260 (H) 07/02/2018 04:27 AM    HDL Cholesterol 31 07/02/2018 04:27 AM    LDL, calculated 197.2 (H) 07/02/2018 04:27 AM    Triglyceride 159 (H) 07/02/2018 04:27 AM    CHOL/HDL Ratio 8.4 (H) 07/02/2018 04:27 AM     Lab Results   Component Value Date/Time    Glucose (POC) 216 (H) 12/06/2021 08:47 PM    Glucose (POC) 164 (H) 12/06/2021 04:43 PM    Glucose (POC) 121 (H) 12/06/2021 11:06 AM    Glucose (POC) 130 (H) 12/06/2021 05:57 AM    Glucose (POC) 288 (H) 12/05/2021 11:02 PM     Lab Results   Component Value Date/Time    Color YELLOW/STRAW 12/02/2021 11:00 PM    Appearance CLEAR 12/02/2021 11:00 PM    Specific gravity 1.025 12/02/2021 11:00 PM    Specific gravity 1.010 06/08/2011 12:00 AM    pH (UA) 5.5 12/02/2021 11:00 PM    Protein 300 (A) 12/02/2021 11:00 PM    Glucose 500 (A) 12/02/2021 11:00 PM    Ketone Negative 12/02/2021 11:00 PM    Bilirubin Negative 12/02/2021 11:00 PM    Urobilinogen 1.0 12/02/2021 11:00 PM    Nitrites Negative 12/02/2021 11:00 PM    Leukocyte Esterase Negative 12/02/2021 11:00 PM    Epithelial cells FEW 12/02/2021 11:00 PM    Bacteria Negative 12/02/2021 11:00 PM    WBC 0-4 12/02/2021 11:00 PM    RBC 0-5 12/02/2021 11:00 PM         Medications Reviewed:     Current Facility-Administered Medications   Medication Dose Route Frequency    epoetin joni-epbx (RETACRIT) injection 20,000 Units  20,000 Units SubCUTAneous Q MON, WED & FRI    hydrALAZINE (APRESOLINE) tablet 50 mg  50 mg Oral TID    rosuvastatin (CRESTOR) tablet 10 mg  10 mg Oral QHS    0.9% sodium chloride infusion 250 mL  250 mL IntraVENous PRN    0.9% sodium chloride infusion 250 mL  250 mL IntraVENous PRN    oxyCODONE-acetaminophen (PERCOCET) 5-325 mg per tablet 1 Tablet  1 Tablet Oral Q4H PRN    morphine injection 2 mg  2 mg IntraVENous Q4H PRN    heparin (porcine) 1,000 unit/mL injection 1,100 Units  1,100 Units Hemodialysis DIALYSIS PRN    heparin (porcine) 1,000 unit/mL injection 1,400 Units  1,400 Units Hemodialysis DIALYSIS PRN    0.9% sodium chloride infusion 250 mL  250 mL IntraVENous PRN    sodium chloride (NS) flush 5-40 mL  5-40 mL IntraVENous Q8H    sodium chloride (NS) flush 5-40 mL  5-40 mL IntraVENous PRN    acetaminophen (TYLENOL) tablet 650 mg  650 mg Oral Q6H PRN    Or    acetaminophen (TYLENOL) suppository 650 mg  650 mg Rectal Q6H PRN    polyethylene glycol (MIRALAX) packet 17 g  17 g Oral DAILY PRN    ondansetron (ZOFRAN ODT) tablet 4 mg  4 mg Oral Q8H PRN    Or    ondansetron (ZOFRAN) injection 4 mg  4 mg IntraVENous Q6H PRN    albuterol-ipratropium (DUO-NEB) 2.5 MG-0.5 MG/3 ML  3 mL Nebulization Q4H PRN    aspirin tablet 325 mg  325 mg Oral DAILY    metoprolol succinate (TOPROL-XL) XL tablet 100 mg  100 mg Oral DAILY    NIFEdipine ER (PROCARDIA XL) tablet 60 mg  60 mg Oral DAILY    montelukast (SINGULAIR) tablet 10 mg  10 mg Oral QHS    arformoterol 15 mcg/budesonide 0.25 mg neb solution   Nebulization BID RT    insulin lispro (HUMALOG) injection   SubCUTAneous AC&HS    glucose chewable tablet 16 g  4 Tablet Oral PRN    dextrose (D50W) injection syrg 12.5-25 g  12.5-25 g IntraVENous PRN    glucagon (GLUCAGEN) injection 1 mg  1 mg IntraMUSCular PRN     ______________________________________________________________________  EXPECTED LENGTH OF STAY: 3d 19h  ACTUAL LENGTH OF STAY:          4                 Nash Cameron MD

## 2021-12-07 NOTE — H&P
Interventional and Vascular Radiology History and Physical    Patient: Osiel Guerra 78 y.o. female       Chief Complaint: Shortness of Breath and Headache      History of Present Illness: dialysis     History:    Past Medical History:   Diagnosis Date    Asthma     CAD (coronary artery disease)     [facility]estive [facility] failure[facility]     COPD (chronic obstructive pulmonary disease) ([facility]     Diabetes ([facility]     Hypertension      History reviewed. No pertinent family history. Social History     Socioeconomic History    Marital status: SINGLE     Spouse name: Not on file    Number of children: Not on file    Years of education: Not on file    Highest education level: Not on file   Occupational History    Not on file   Tobacco Use    Smoking status: Former Smoker     Packs/day: 0.25     Quit date: 2021     Years since quittin.0    Smokeless tobacco: Not on file   Substance and Sexual Activity    Alcohol use: No    Drug use: Not on file    Sexual activity: Not on file   Other Topics Concern    Not on file   Social History Narrative    Not on file     Social Determinants of Health     Financial Resource Strain:     Difficulty of Paying Living Expenses: Not on file   Food Insecurity:     Worried About [facility] in the Last Year: Not on file    Dieter of Food in the Last Year: Not on file   Transportation Needs:     Lack of Transportation (Medical): Not on file    Lack of Transportation (Non-Medical):  Not on file   Physical Activity:     Days of Exercise per Week: Not on file    Minutes of Exercise per Session: Not on file   Stress:     Feeling of Stress : Not on file   Social Connections:     Frequency of Communication with Friends and Family: Not on file    Frequency of Social Gatherings with Friends and Family: Not on file    Attends Hinduism Services: Not on file    Active Member of Clubs or Organizations: Not on file    Attends Club or Organization Meetings: Not on file    Marital Status: Not on file   Intimate Partner Violence:     Fear of Current or Ex-Partner: Not on file    Emotionally Abused: Not on file    Physically Abused: Not on file    Sexually Abused: Not on file   Housing Stability:     Unable to Pay for Housing in the Last Year: Not on file    Number of [place] in the Last Year: Not on file    Unstable Housing in the Last Year: Not on file       Allergies:    Allergies   Allergen Reactions    Ivp Dye [Fd And C Blue No.1] Hives       Current Medications:  Current Facility-Administered Medications   Medication Dose Route Frequency    flumazeniL (ROMAZICON) 0.1 mg/mL injection 0.5 mg  0.5 mg IntraVENous RAD PRN    midazolam (VERSED) injection 5 mg  5 mg IntraVENous Rad Multiple    fentaNYL citrate (PF) injection 200 mcg  200 mcg IntraVENous Rad Multiple    naloxone (NARCAN) injection 0.4 mg  0.4 mg IntraVENous RAD PRN    0.9% sodium chloride infusion  25 mL/hr IntraVENous RAD CONTINUOUS    epoetin joni-epbx (RETACRIT) injection 20,000 Units  20,000 Units SubCUTAneous Q MON, WED & FRI    hydrALAZINE (APRESOLINE) tablet 50 mg  50 mg Oral TID    rosuvastatin (CRESTOR) tablet 10 mg  10 mg Oral QHS    0.9% sodium chloride infusion 250 mL  250 mL IntraVENous PRN    0.9% sodium chloride infusion 250 mL  250 mL IntraVENous PRN    oxyCODONE-acetaminophen (PERCOCET) 5-325 mg per tablet 1 Tablet  1 Tablet Oral Q4H PRN    morphine injection 2 mg  2 mg IntraVENous Q4H PRN    heparin (porcine) 1,000 unit/mL injection 1,100 Units  1,100 Units Hemodialysis DIALYSIS PRN    heparin (porcine) 1,000 unit/mL injection 1,400 Units  1,400 Units Hemodialysis DIALYSIS PRN    0.9% sodium chloride infusion 250 mL  250 mL IntraVENous PRN    sodium chloride (NS) flush 5-40 mL  5-40 mL IntraVENous Q8H    sodium chloride (NS) flush 5-40 mL  5-40 mL IntraVENous PRN    acetaminophen (TYLENOL) tablet 650 mg  650 mg Oral Q6H PRN    Or    acetaminophen (TYLENOL) suppository 650 mg  650 mg Rectal Q6H PRN    polyethylene glycol (MIRALAX) packet 17 g  17 g Oral DAILY PRN    ondansetron (ZOFRAN ODT) tablet 4 mg  4 mg Oral Q8H PRN    Or    ondansetron (ZOFRAN) injection 4 mg  4 mg IntraVENous Q6H PRN    albuterol-ipratropium (DUO-NEB) 2.5 MG-0.5 MG/3 ML  3 mL Nebulization Q4H PRN    aspirin tablet 325 mg  325 mg Oral DAILY    metoprolol succinate (TOPROL-XL) XL tablet 100 mg  100 mg Oral DAILY    NIFEdipine ER (PROCARDIA XL) tablet 60 mg  60 mg Oral DAILY    montelukast (SINGULAIR) tablet 10 mg  10 mg Oral QHS    arformoterol 15 mcg/budesonide 0.25 mg neb solution   Nebulization BID RT    insulin lispro (HUMALOG) injection   SubCUTAneous AC&HS    glucose chewable tablet 16 g  4 Tablet Oral PRN    dextrose (D50W) injection syrg 12.5-25 g  12.5-25 g IntraVENous PRN    glucagon (GLUCAGEN) injection 1 mg  1 mg IntraMUSCular PRN        Physical Exam:  Blood pressure (!) 171/78, pulse 87, temperature 98.3 °F (36.8 °C), resp. rate 18, height 5' 4\" (1.626 m), weight 98 kg (216 lb), SpO2 95 %. LUNG: clear to auscultation bilaterally, HEART: regular rate and rhythm, S1, S2 normal, no murmur, click, rub or gallop      Alerts:    Hospital Problems  Date Reviewed: 4/25/2019          Codes Class Noted POA    Acute renal failure (ARF) ([facility] ICD-10-CM: N17.9  ICD-9-CM: 584.9  12/2/2021 Unknown              Laboratory:      Recent Labs     12/07/21  0231   HGB 8.9*   HCT 27.6*   WBC 7.9      BUN 21*  21*   CREA 3.61*  3.58*   K HEMOLYZED,RECOLLECT REQUESTED  5.8*         Plan of Care/Planned Procedure:  Risks, benefits, and alternatives reviewed with patient and she agrees to proceed with the procedure. Conscious sedation will be performed with IV fentanyl and versed.  Plan is for permcath placement       Dayron Maza MD

## 2021-12-07 NOTE — PROGRESS NOTES
Bedside shift change report given to [address] [place] [address] Layla (oncoming nurse) by Hari Woodall RN (offgoing nurse). Report included the following information SBAR, Intake/Output, MAR, Med Rec Status and Cardiac Rhythm nsr.

## 2021-12-08 ENCOUNTER — APPOINTMENT (OUTPATIENT)
Dept: NON INVASIVE DIAGNOSTICS | Age: 66
DRG: 674 | End: 2021-12-08
Attending: INTERNAL MEDICINE
Payer: MEDICARE

## 2021-12-08 LAB
ANION GAP SERPL CALC-SCNC: 10 MMOL/L (ref 5–15)
BUN SERPL-MCNC: 25 MG/DL (ref 6–20)
BUN/CREAT SERPL: 6 (ref 12–20)
CALCIUM SERPL-MCNC: 8.1 MG/DL (ref 8.5–10.1)
CHLORIDE SERPL-SCNC: 106 MMOL/L (ref 97–108)
CO2 SERPL-SCNC: 21 MMOL/L (ref 21–32)
CREAT SERPL-MCNC: 4.44 MG/DL (ref 0.55–1.02)
ERYTHROCYTE [DISTWIDTH] IN BLOOD BY AUTOMATED COUNT: 14.8 % (ref 11.5–14.5)
GLUCOSE BLD STRIP.AUTO-MCNC: 131 MG/DL (ref 65–117)
GLUCOSE BLD STRIP.AUTO-MCNC: 216 MG/DL (ref 65–117)
GLUCOSE BLD STRIP.AUTO-MCNC: 216 MG/DL (ref 65–117)
GLUCOSE BLD STRIP.AUTO-MCNC: 274 MG/DL (ref 65–117)
GLUCOSE SERPL-MCNC: 222 MG/DL (ref 65–100)
HCT VFR BLD AUTO: 31.3 % (ref 35–47)
HGB BLD-MCNC: 9.7 G/DL (ref 11.5–16)
MCH RBC QN AUTO: 29.5 PG (ref 26–34)
MCHC RBC AUTO-ENTMCNC: 31 G/DL (ref 30–36.5)
MCV RBC AUTO: 95.1 FL (ref 80–99)
NRBC # BLD: 0 K/UL (ref 0–0.01)
NRBC BLD-RTO: 0 PER 100 WBC
PLATELET # BLD AUTO: 308 K/UL (ref 150–400)
PMV BLD AUTO: 9.3 FL (ref 8.9–12.9)
POTASSIUM SERPL-SCNC: 4 MMOL/L (ref 3.5–5.1)
RBC # BLD AUTO: 3.29 M/UL (ref 3.8–5.2)
SERVICE CMNT-IMP: ABNORMAL
SODIUM SERPL-SCNC: 137 MMOL/L (ref 136–145)
WBC # BLD AUTO: 8.9 K/UL (ref 3.6–11)

## 2021-12-08 PROCEDURE — 74011250637 HC RX REV CODE- 250/637: Performed by: HOSPITALIST

## 2021-12-08 PROCEDURE — 36415 COLL VENOUS BLD VENIPUNCTURE: CPT

## 2021-12-08 PROCEDURE — 86704 HEP B CORE ANTIBODY TOTAL: CPT

## 2021-12-08 PROCEDURE — 74011250636 HC RX REV CODE- 250/636: Performed by: INTERNAL MEDICINE

## 2021-12-08 PROCEDURE — 94664 DEMO&/EVAL PT USE INHALER: CPT

## 2021-12-08 PROCEDURE — 74011000250 HC RX REV CODE- 250: Performed by: HOSPITALIST

## 2021-12-08 PROCEDURE — 74011250637 HC RX REV CODE- 250/637: Performed by: FAMILY MEDICINE

## 2021-12-08 PROCEDURE — 65660000001 HC RM ICU INTERMED STEPDOWN

## 2021-12-08 PROCEDURE — 85027 COMPLETE CBC AUTOMATED: CPT

## 2021-12-08 PROCEDURE — 90935 HEMODIALYSIS ONE EVALUATION: CPT

## 2021-12-08 PROCEDURE — 74011636637 HC RX REV CODE- 636/637: Performed by: HOSPITALIST

## 2021-12-08 PROCEDURE — 94640 AIRWAY INHALATION TREATMENT: CPT

## 2021-12-08 PROCEDURE — 80048 BASIC METABOLIC PNL TOTAL CA: CPT

## 2021-12-08 PROCEDURE — 74011250636 HC RX REV CODE- 250/636: Performed by: FAMILY MEDICINE

## 2021-12-08 PROCEDURE — 74011250637 HC RX REV CODE- 250/637: Performed by: INTERNAL MEDICINE

## 2021-12-08 PROCEDURE — 82962 GLUCOSE BLOOD TEST: CPT

## 2021-12-08 RX ADMIN — ARFORMOTEROL TARTRATE: 15 SOLUTION RESPIRATORY (INHALATION) at 22:12

## 2021-12-08 RX ADMIN — EPOETIN ALFA-EPBX 20000 UNITS: 20000 INJECTION, SOLUTION INTRAVENOUS; SUBCUTANEOUS at 22:19

## 2021-12-08 RX ADMIN — HYDRALAZINE HYDROCHLORIDE 50 MG: 50 TABLET, FILM COATED ORAL at 09:58

## 2021-12-08 RX ADMIN — ARFORMOTEROL TARTRATE: 15 SOLUTION RESPIRATORY (INHALATION) at 07:51

## 2021-12-08 RX ADMIN — HEPARIN SODIUM 1100 UNITS: 1000 INJECTION, SOLUTION INTRAVENOUS; SUBCUTANEOUS at 09:04

## 2021-12-08 RX ADMIN — NIFEDIPINE 60 MG: 30 TABLET, FILM COATED, EXTENDED RELEASE ORAL at 09:58

## 2021-12-08 RX ADMIN — OXYCODONE AND ACETAMINOPHEN 1 TABLET: 5; 325 TABLET ORAL at 22:08

## 2021-12-08 RX ADMIN — OXYCODONE AND ACETAMINOPHEN 1 TABLET: 5; 325 TABLET ORAL at 10:02

## 2021-12-08 RX ADMIN — INSULIN LISPRO 5 UNITS: 100 INJECTION, SOLUTION INTRAVENOUS; SUBCUTANEOUS at 12:08

## 2021-12-08 RX ADMIN — METOPROLOL SUCCINATE 100 MG: 50 TABLET, EXTENDED RELEASE ORAL at 09:58

## 2021-12-08 RX ADMIN — MONTELUKAST 10 MG: 10 TABLET, FILM COATED ORAL at 22:06

## 2021-12-08 RX ADMIN — HEPARIN SODIUM 1400 UNITS: 1000 INJECTION INTRAVENOUS; SUBCUTANEOUS at 09:04

## 2021-12-08 RX ADMIN — HYDRALAZINE HYDROCHLORIDE 50 MG: 50 TABLET, FILM COATED ORAL at 22:06

## 2021-12-08 RX ADMIN — ROSUVASTATIN 10 MG: 10 TABLET, FILM COATED ORAL at 22:06

## 2021-12-08 RX ADMIN — INSULIN LISPRO 3 UNITS: 100 INJECTION, SOLUTION INTRAVENOUS; SUBCUTANEOUS at 17:08

## 2021-12-08 RX ADMIN — INSULIN LISPRO 2 UNITS: 100 INJECTION, SOLUTION INTRAVENOUS; SUBCUTANEOUS at 22:19

## 2021-12-08 RX ADMIN — ASPIRIN 325 MG ORAL TABLET 325 MG: 325 PILL ORAL at 09:58

## 2021-12-08 RX ADMIN — HYDRALAZINE HYDROCHLORIDE 50 MG: 50 TABLET, FILM COATED ORAL at 17:09

## 2021-12-08 RX ADMIN — MORPHINE SULFATE 2 MG: 2 INJECTION, SOLUTION INTRAMUSCULAR; INTRAVENOUS at 12:08

## 2021-12-08 NOTE — PROGRESS NOTES
Problem: Discharge Planning  Goal: *Discharge to safe environment  Outcome: Progressing Towards Goal  Goal: *Knowledge of medication management  Outcome: Progressing Towards Goal  Goal: *Knowledge of discharge instructions  Outcome: Progressing Towards Goal     Problem: Patient Education: Go to Patient Education Activity  Goal: Patient/Family Education  Outcome: Progressing Towards Goal     Problem: Falls - Risk of  Goal: *Absence of Falls  Description: Document Leafy Sunshine Fall Risk and appropriate interventions in the flowsheet.   Outcome: Progressing Towards Goal  Note: Fall Risk Interventions:  Mobility Interventions: Patient to call before getting OOB         Medication Interventions: Patient to call before getting OOB, Evaluate medications/consider consulting pharmacy    Elimination Interventions: Call light in reach, Patient to call for help with toileting needs              Problem: Patient Education: Go to Patient Education Activity  Goal: Patient/Family Education  Outcome: Progressing Towards Goal     Problem: Breathing Pattern - Ineffective  Goal: *Absence of hypoxia  Outcome: Progressing Towards Goal

## 2021-12-08 NOTE — PROGRESS NOTES
[facility]   [address] [facility], [id number] [place] [address]  Phone: [phone]   [phone]    [email or web address]     Nephrology Progress Note    Patient Name : Fercho Monzon      : 1955     MRN : [id number]  Date of Admission : 2021  Date of Servive : 21    CC: Follow up for ARF      Assessment and Plan   BRENNAN on CKD:   - progressive CKD vs other   - started HD on 12/3  - HD MWF for now  - CM to arrange out pt dialysis  - does not want to go to [facility] unit    Nephrotic Range Proteinuria   - 12 gms, no M-spike on SPEP    CKD 4  - progressive CKD - presumed DKD  - baseline Cr 3.9 mg/dl in   - f/b Dr Mariola Cartwright at [facility]     Anemia in CKD   - started MADY    Back pain   - ?  R psoas muscle Lipoma     HTN   DM-II, HLD  Obesity      Interval History:  Seen and examined on dialysis. No issues so far. BP stable. No cp, sob, n/v/d    Review of Systems: A comprehensive review of systems was negative except for that written in the HPI.     Current Medications:   Current Facility-Administered Medications   Medication Dose Route Frequency    epoetin joni-epbx (RETACRIT) injection 20,000 Units  20,000 Units SubCUTAneous Q MON, WED & FRI    hydrALAZINE (APRESOLINE) tablet 50 mg  50 mg Oral TID    rosuvastatin (CRESTOR) tablet 10 mg  10 mg Oral QHS    0.9% sodium chloride infusion 250 mL  250 mL IntraVENous PRN    0.9% sodium chloride infusion 250 mL  250 mL IntraVENous PRN    oxyCODONE-acetaminophen (PERCOCET) 5-325 mg per tablet 1 Tablet  1 Tablet Oral Q4H PRN    morphine injection 2 mg  2 mg IntraVENous Q4H PRN    heparin (porcine) 1,000 unit/mL injection 1,100 Units  1,100 Units Hemodialysis DIALYSIS PRN    heparin (porcine) 1,000 unit/mL injection 1,400 Units  1,400 Units Hemodialysis DIALYSIS PRN    0.9% sodium chloride infusion 250 mL  250 mL IntraVENous PRN    sodium chloride (NS) flush 5-40 mL  5-40 mL IntraVENous Q8H    sodium chloride (NS) flush 5-40 mL  5-40 mL IntraVENous PRN    acetaminophen (TYLENOL) tablet 650 mg  650 mg Oral Q6H PRN    Or    acetaminophen (TYLENOL) suppository 650 mg  650 mg Rectal Q6H PRN    polyethylene glycol (MIRALAX) packet 17 g  17 g Oral DAILY PRN    ondansetron (ZOFRAN ODT) tablet 4 mg  4 mg Oral Q8H PRN    Or    ondansetron (ZOFRAN) injection 4 mg  4 mg IntraVENous Q6H PRN    albuterol-ipratropium (DUO-NEB) 2.5 MG-0.5 MG/3 ML  3 mL Nebulization Q4H PRN    aspirin tablet 325 mg  325 mg Oral DAILY    metoprolol succinate (TOPROL-XL) XL tablet 100 mg  100 mg Oral DAILY    NIFEdipine ER (PROCARDIA XL) tablet 60 mg  60 mg Oral DAILY    montelukast (SINGULAIR) tablet 10 mg  10 mg Oral QHS    arformoterol 15 mcg/budesonide 0.25 mg neb solution   Nebulization BID RT    insulin lispro (HUMALOG) injection   SubCUTAneous AC&HS    glucose chewable tablet 16 g  4 Tablet Oral PRN    dextrose (D50W) injection syrg 12.5-25 g  12.5-25 g IntraVENous PRN    glucagon (GLUCAGEN) injection 1 mg  1 mg IntraMUSCular PRN      Allergies   Allergen Reactions    Ivp Dye [Fd And C Blue No.1] Hives       Objective:  Vitals:    Vitals:    12/08/21 0815 12/08/21 0830 12/08/21 0845 12/08/21 0900   BP: (!) 191/79 (!) 169/72 (!) 171/68 (!) 163/64   Pulse: 96 91 92 91   Resp: 16 17 18 20   Temp:       TempSrc:       SpO2: 100% 99% 100% 100%   Weight:       Height:         Intake and Output:  12/08 0701 - 12/08 1900  In: -   Out: 2000   No intake/output data recorded.     Physical Examination:        General: Obese   Neck:  RIJ PC in place  Resp:  Lungs clear b/l  CV:  RRR,  no murmur or rub, 1+ LE edema  GI:  Soft, NT, + BS, no HS megaly  Neurologic:  Non focal  Psych:             AAO x 3 appropriate affect   Skin:  No Rash      []    High complexity decision making was performed  []    Patient is at high-risk of decompensation with multiple organ involvement    Lab Data Personally Reviewed: I have reviewed all the pertinent labs, microbiology data and radiology studies during assessment.     Recent Labs     12/08/21  0112 12/07/21  0231 12/06/21  0216    138  136 139   K 4.0 HEMOLYZED,RECOLLECT REQUESTED  5.8* 3.8    106  105 106   CO2 21 25  26 25   * 165*  169* 134*   BUN 25* 21*  21* 33*   CREA 4.44* 3.61*  3.58* 4.61*   CA 8.1* 8.1*  8.1* 7.8*   PHOS  --  3.5  --    ALB  --  1.6*  --      Recent Labs     12/08/21  0112 12/07/21  0231 12/06/21  0216   WBC 8.9 7.9 7.6   HGB 9.7* 8.9* 9.2*   HCT 31.3* 27.6* 28.8*    294 303     No results found for: [facility]  Lab Results   Component Value Date/Time    Culture result: NO GROWTH 5 DAYS 03/15/2018 06:09 PM     Recent Results (from the past 24 hour(s))   GLUCOSE, POC    Collection Time: 12/07/21 11:23 AM   Result Value Ref Range    Glucose (POC) 193 (H) 65 - 117 mg/dL    Performed by Curt Mari    GLUCOSE, POC    Collection Time: 12/07/21  5:30 PM   Result Value Ref Range    Glucose (POC) 153 (H) 65 - 117 mg/dL    Performed by Curt Mari    GLUCOSE, POC    Collection Time: 12/07/21  9:39 PM   Result Value Ref Range    Glucose (POC) 204 (H) 65 - 117 mg/dL    Performed by Gage Boo RN    CBC W/O DIFF    Collection Time: 12/08/21  1:12 AM   Result Value Ref Range    WBC 8.9 3.6 - 11.0 K/uL    RBC 3.29 (L) 3.80 - 5.20 M/uL    HGB 9.7 (L) 11.5 - 16.0 g/dL    HCT 31.3 (L) 35.0 - 47.0 %    MCV 95.1 80.0 - 99.0 FL    MCH 29.5 26.0 - 34.0 PG    MCHC 31.0 30.0 - 36.5 g/dL    RDW 14.8 (H) 11.5 - 14.5 %    PLATELET 068 680 - 515 K/uL    MPV 9.3 8.9 - 12.9 FL    NRBC 0.0 0  WBC    ABSOLUTE NRBC 0.00 0.00 - 9.17 K/uL   METABOLIC PANEL, BASIC    Collection Time: 12/08/21  1:12 AM   Result Value Ref Range    Sodium 137 136 - 145 mmol/L    Potassium 4.0 3.5 - 5.1 mmol/L    Chloride 106 97 - 108 mmol/L    CO2 21 21 - 32 mmol/L    Anion gap 10 5 - 15 mmol/L    Glucose 222 (H) 65 - 100 mg/dL    BUN 25 (H) 6 - 20 MG/DL    Creatinine 4.44 (H) 0.55 - 1.02 MG/DL BUN/Creatinine ratio 6 (L) 12 - 20      GFR est AA 12 (L) >60 ml/min/1.73m2    GFR est non-AA 10 (L) >60 ml/min/1.73m2    Calcium 8.1 (L) 8.5 - 10.1 MG/DL   GLUCOSE, POC    Collection Time: 12/08/21  5:23 AM   Result Value Ref Range    Glucose (POC) 131 (H) 65 - 117 mg/dL    Performed by Marylee Colas. Geo (CON)            I have reviewed the flowsheets. Chart and Pertinent Notes have been reviewed. No change in PMH ,family and social history from Consult note.       Nicole Craig MD  [facility]

## 2021-12-08 NOTE — ADVANCED PRACTICE NURSE
Hemodialysis / [phone]    Vitals Pre Post Assessment Pre Post   BP BP: (!) 169/70 (12/08/21 0615) 163/64 LOC A&O x 3 A&O x 3   HR Pulse (Heart Rate): 100 (12/08/21 0615) 91 Lungs clear clear   Resp Resp Rate: 17 (12/08/21 0615) 17 Cardiac regular regular   Temp Temp: 98.1 °F (36.7 °C) (12/08/21 0525) 98.1 Skin warm warm   Weight Pre-Dialysis Weight: 96.2 kg (212 lb 1.3 oz) (12/08/21 0525) 94.3 kg Edema generalized generalized   Tele status remote remote Pain Pain Intensity 1: 0 (12/07/21 2000) No pain     Orders   Duration: Start: 0530 End: 0900 Total: 3.5 hr   Dialyzer: Dialyzer/Set Up Inspection: Loida Zoila ([id number]) (12/08/21 0525)   K Bath: Dialysate K (mEq/L): 3 (12/08/21 0525)   Ca Bath: Dialysate CA (mEq/L): 2.5 (12/08/21 0525)   Na: Dialysate NA (mEq/L): 140 (12/08/21 0525)   Bicarb: Dialysate HCO3 (mEq/L): 35 (12/08/21 0525)   Target Fluid Removal: Goal/Amount of Fluid to Remove (mL): 2000 mL (12/08/21 0525)     Access   Type & Location: [facility] cath   Comments: In place, patent, dressing dry and intact, no S/S of infection          Labs   HBsAg (Antigen) / date:           12/02/21 Negative                                    HBsAb (Antibody) / date: 12/02/21 [facility] [place] [place].    Source:    Obtained/Reviewed  Critical Results Called HGB   Date Value Ref Range Status   12/08/2021 9.7 (L) 11.5 - 16.0 g/dL Final     Potassium   Date Value Ref Range Status   12/08/2021 4.0 3.5 - 5.1 mmol/L Final     Calcium   Date Value Ref Range Status   12/08/2021 8.1 (L) 8.5 - 10.1 MG/DL Final     BUN   Date Value Ref Range Status   12/08/2021 25 (H) 6 - 20 MG/DL Final     Creatinine   Date Value Ref Range Status   12/08/2021 4.44 (H) 0.55 - 1.02 MG/DL Final     Comment:     INVESTIGATED PER DELTA CHECK PROTOCOL        Meds Given   Name Dose Route                    Adequacy / Fluid    Total Liters Process: 66 L   Net Fluid Removed: 2000 ml      Comments   Time Out Done:   (Time) Yes, 2824 Admitting Diagnosis: Renal failure   Consent obtained/signed: Informed Consent Verified: Yes (12/06/21 0820)   Machine / RO # Machine Number: [id number] (12/08/21 1177)   Primary Nurse Rpt Pre: Jett Weiss RN   Primary Nurse Rpt Post: Ifeoma Mari RN   Pt Education: Yes, r/t dialysis process   Care Plan: Continue HD as ordered   Pts outpatient clinic:      Tx Summary   Comments: Tolerated tx well, at end, all blood in circuit returned with 300 ml NS, [place] [facility] [place] cath in place, patent, dressing dry and intact, no S/S of infection.

## 2021-12-08 NOTE — PROGRESS NOTES
Bedside shift change report given to Claire Marcus RN (oncoming nurse) by MARIAN Mari (offgoing nurse). Report included the following information SBAR, Kardex, Intake/Output, MAR, Accordion and Cardiac Rhythm NSR.

## 2021-12-08 NOTE — PROGRESS NOTES
Problem: Discharge Planning  Goal: *Discharge to safe environment  Outcome: Progressing Towards Goal  Goal: *Knowledge of medication management  Outcome: Progressing Towards Goal  Goal: *Knowledge of discharge instructions  Outcome: Progressing Towards Goal     Problem: Patient Education: Go to Patient Education Activity  Goal: Patient/Family Education  Outcome: Progressing Towards Goal     Problem: Falls - Risk of  Goal: *Absence of Falls  Description: Document Kendra Lobe Fall Risk and appropriate interventions in the flowsheet.   Outcome: Progressing Towards Goal  Note: Fall Risk Interventions:  Mobility Interventions: Patient to call before getting OOB         Medication Interventions: Patient to call before getting OOB, Evaluate medications/consider consulting pharmacy    Elimination Interventions: Call light in reach, Patient to call for help with toileting needs              Problem: Patient Education: Go to Patient Education Activity  Goal: Patient/Family Education  Outcome: Progressing Towards Goal     Problem: Breathing Pattern - Ineffective  Goal: *Absence of hypoxia  Outcome: Progressing Towards Goal

## 2021-12-08 NOTE — PROGRESS NOTES
[facility] Adult  Hospitalist Group                                                                                          Hospitalist Progress Note  Boris Torres MD  Answering service: [phone] OR [phone] from in house phone      NAME:  Treva Leonardo  :  1955  MRN:  [id number]      Admission Summary:   78 y.o lady w/ CAD, CHF, COPD, DM, HTN, asthma, CKD IV, who presents with shortness of breath. Symptoms began at least a week ago. She describes progressive dyspnea worsened with exertion. She denies associated chest pain, fever, n/v/d. She does have a mildly productive cough. Interval history / Subjective:   Patient reports less shortness of breath  Severely elevated creatinine needing dialysis  She is off oxygen today and permacath was placed     Assessment & Plan:     Shortness of breath: this is likely due to pulmonary edema / volume overload in the setting of CKD and CHF. Symptomatic anemia may be contributing. Stable SO2 on RA today     BRENNAN on CKD IV:  -nephrology consulted and orders/recs noted  permacath  placed 21 and started hemodialysis 12/3/21     Anemia: no evidence of bleeding.  Stool is negative for occult blood.   -check iron studies, b12/folate, retics, haptoglobin  -pt transfused 2 units PRBCs     Elevated serum troponin: with EKG changes- consult cardiology for eval and tx recs  stress test done today and negative for ischemia     Asymmetric LE edema: pt states this is chronic  - RLE doppler neg for DVT     Type 2 DM:  -SSI/POC checks     HTN:  -continue metoprolol, nifedipine, added hydralazine     COPD/Asthma: no evidence of bronchospasm  Resume home meds  History of tobacco use, quit 1 month ago    Resp therapy noted evidence of sleep apnea - patient will need formal sleep study after DC and likely needs a CPAP machine    Code status: Full Code  DVT prophylaxis: SCDs    Care Plan discussed with: Patient/Family  Anticipated Disposition: Home w/Family  Anticipated Discharge: Greater than 48 hours     Hospital Problems  Date Reviewed: 4/25/2019          Codes Class Noted POA    [facility] ICD-10-CM: N17.9  ICD-9-CM: 584.9  12/2/2021 Unknown                Review of Systems:   A comprehensive review of systems was negative except for that written in the HPI. Vital Signs:    Last 24hrs VS reviewed since prior progress note. Most recent are:  Visit Vitals  BP (!) 170/57 (BP 1 Location: Right upper arm, BP Patient Position: At rest)   Pulse (!) 101   Temp 98.2 °F (36.8 °C)   Resp 16   Ht 5' 4\" (1.626 m)   Wt 98 kg (216 lb)   SpO2 100%   BMI 37.08 kg/m²     Patient Vitals for the past 24 hrs:   Temp Pulse Resp BP SpO2   12/08/21 0400 -- (!) 101 -- -- --   12/08/21 0312 98.2 °F (36.8 °C) (!) 101 16 (!) 170/57 100 %   12/08/21 0200 -- 93 -- -- --   12/07/21 2348 98.3 °F (36.8 °C) 98 16 133/61 100 %   12/07/21 2343 98.4 °F (36.9 °C) (!) 101 16 (!) 134/114 100 %   12/07/21 2200 -- (!) 104 -- -- --   12/07/21 2000 -- 100 -- -- 95 %   12/07/21 1956 98.6 °F (37 °C) 100 18 (!) 67/62 95 %   12/07/21 1700 -- 87 18 (!) 171/78 95 %   12/07/21 1645 -- 93 18 (!) 157/92 98 %   12/07/21 1634 -- 90 14 (!) 162/83 98 %   12/07/21 1629 -- 89 14 (!) 164/85 100 %   12/07/21 1624 -- 91 13 (!) 165/78 100 %   12/07/21 1618 -- 93 15 (!) 171/91 100 %   12/07/21 1530 98.3 °F (36.8 °C) 99 17 (!) 174/93 99 %   12/07/21 1400 -- 89 -- -- --   12/07/21 1200 -- 92 -- -- --   12/07/21 1119 98.8 °F (37.1 °C) 88 18 (!) 140/72 97 %   12/07/21 1000 -- (!) 103 -- -- --   12/07/21 0822 98.7 °F (37.1 °C) 81 16 (!) 147/64 96 %   12/07/21 0747 -- -- -- -- 100 %   12/07/21 0600 -- 89 -- -- --     No intake or output data in the 24 hours ending 12/08/21 0501     Physical Examination:          Constitutional:  No acute distress, cooperative, pleasant    ENT:  Oral mucosa dry. Resp:  decreased breath sounds bilaterally.  No wheezing/   CV:  Regular rhythm, normal rate, 3/6 SE murmurs,    GI:  Soft, non distended, non tender. Musculoskeletal:  No edema, warm, 2+ pulses throughout    Neurologic:  Moves all extremities. AAOx3, CN II-XII reviewed            Data Review:    Review and/or order of clinical lab test  Review and/or order of tests in the radiology section of CPT  Review and/or order of tests in the medicine section of [facility]      Labs:     Recent Labs     12/08/21  0112 12/07/21  0231   WBC 8.9 7.9   HGB 9.7* 8.9*   HCT 31.3* 27.6*    294     Recent Labs     12/08/21  0112 12/07/21 0231 12/06/21  0216    138  136 139   K 4.0 HEMOLYZED,RECOLLECT REQUESTED  5.8* 3.8    106  105 106   CO2 21 25  26 25   BUN 25* 21*  21* 33*   CREA 4.44* 3.61*  3.58* 4.61*   * 165*  169* 134*   CA 8.1* 8.1*  8.1* 7.8*   PHOS  --  3.5  --      Recent Labs     12/07/21 0231   ALB 1.6*     No results for input(s): INR, PTP, APTT, INREXT, INREXT in the last 72 hours. No results for input(s): FE, TIBC, PSAT, FERR in the last 72 hours. Lab Results   Component Value Date/Time    Folate 18.1 12/02/2021 08:16 PM      No results for input(s): PH, PCO2, PO2 in the last 72 hours. No results for input(s): CPK, CKNDX, TROIQ in the last 72 hours.     No lab exists for component: CPKMB  Lab Results   Component Value Date/Time    Cholesterol, total 260 (H) 07/02/2018 04:27 AM    HDL Cholesterol 31 07/02/2018 04:27 AM    LDL, calculated 197.2 (H) 07/02/2018 04:27 AM    Triglyceride 159 (H) 07/02/2018 04:27 AM    CHOL/HDL Ratio 8.4 (H) 07/02/2018 04:27 AM     Lab Results   Component Value Date/Time    Glucose (POC) 204 (H) 12/07/2021 09:39 PM    Glucose (POC) 153 (H) 12/07/2021 05:30 PM    Glucose (POC) 193 (H) 12/07/2021 11:23 AM    Glucose (POC) 185 (H) 12/07/2021 05:50 AM    Glucose (POC) 216 (H) 12/06/2021 08:47 PM     Lab Results   Component Value Date/Time    Color YELLOW/STRAW 12/02/2021 11:00 PM    Appearance CLEAR 12/02/2021 11:00 PM    Specific gravity 1.025 12/02/2021 11:00 PM    Specific gravity 1.010 06/08/2011 12:00 AM    pH (UA) 5.5 12/02/2021 11:00 PM    Protein 300 (A) 12/02/2021 11:00 PM    Glucose 500 (A) 12/02/2021 11:00 PM    Ketone Negative 12/02/2021 11:00 PM    Bilirubin Negative 12/02/2021 11:00 PM    Urobilinogen 1.0 12/02/2021 11:00 PM    Nitrites Negative 12/02/2021 11:00 PM    Leukocyte Esterase Negative 12/02/2021 11:00 PM    Epithelial cells FEW 12/02/2021 11:00 PM    Bacteria Negative 12/02/2021 11:00 PM    WBC 0-4 12/02/2021 11:00 PM    RBC 0-5 12/02/2021 11:00 PM         Medications Reviewed:     Current Facility-Administered Medications   Medication Dose Route Frequency    epoetin joni-epbx (RETACRIT) injection 20,000 Units  20,000 Units SubCUTAneous Q MON, WED & FRI    hydrALAZINE (APRESOLINE) tablet 50 mg  50 mg Oral TID    rosuvastatin (CRESTOR) tablet 10 mg  10 mg Oral QHS    0.9% sodium chloride infusion 250 mL  250 mL IntraVENous PRN    0.9% sodium chloride infusion 250 mL  250 mL IntraVENous PRN    oxyCODONE-acetaminophen (PERCOCET) 5-325 mg per tablet 1 Tablet  1 Tablet Oral Q4H PRN    morphine injection 2 mg  2 mg IntraVENous Q4H PRN    heparin (porcine) 1,000 unit/mL injection 1,100 Units  1,100 Units Hemodialysis DIALYSIS PRN    heparin (porcine) 1,000 unit/mL injection 1,400 Units  1,400 Units Hemodialysis DIALYSIS PRN    0.9% sodium chloride infusion 250 mL  250 mL IntraVENous PRN    sodium chloride (NS) flush 5-40 mL  5-40 mL IntraVENous Q8H    sodium chloride (NS) flush 5-40 mL  5-40 mL IntraVENous PRN    acetaminophen (TYLENOL) tablet 650 mg  650 mg Oral Q6H PRN    Or    acetaminophen (TYLENOL) suppository 650 mg  650 mg Rectal Q6H PRN    polyethylene glycol (MIRALAX) packet 17 g  17 g Oral DAILY PRN    ondansetron (ZOFRAN ODT) tablet 4 mg  4 mg Oral Q8H PRN    Or    ondansetron (ZOFRAN) injection 4 mg  4 mg IntraVENous Q6H PRN    albuterol-ipratropium (DUO-NEB) 2.5 MG-0.5 MG/3 ML  3 mL Nebulization Q4H PRN    aspirin tablet 325 mg  325 mg Oral DAILY    metoprolol succinate (TOPROL-XL) XL tablet 100 mg  100 mg Oral DAILY    NIFEdipine ER (PROCARDIA XL) tablet 60 mg  60 mg Oral DAILY    montelukast (SINGULAIR) tablet 10 mg  10 mg Oral QHS    arformoterol 15 mcg/budesonide 0.25 mg neb solution   Nebulization BID RT    insulin lispro (HUMALOG) injection   SubCUTAneous AC&HS    glucose chewable tablet 16 g  4 Tablet Oral PRN    dextrose (D50W) injection syrg 12.5-25 g  12.5-25 g IntraVENous PRN    glucagon (GLUCAGEN) injection 1 mg  1 mg IntraMUSCular PRN     ______________________________________________________________________  EXPECTED LENGTH OF STAY: 3d 19h  ACTUAL LENGTH OF STAY:          6                 Michael Sandoval MD

## 2021-12-08 NOTE — PROGRESS NOTES
PRIMITIVO: Anticipate discharge home on new dialysis at a [facility] location. Transportation likely in car with family.     RUR: 11%     Disposition: CM spoke with nephrology who stated patient and daughter no longer want to go to the [address] [place] [address] location. CM spoke with patient and daughter who would like to access dialysis with [facility]. The two locations chosen are [facility] and [facility] [address] contacted [facility] to initiate the referral. [facility] is able to accommodate at the [facility] located at [place] [address] (p: [phone] f: [phone]). Chair time would be between 3:30 and 5:00pm. CM faxed referral information on this date. CM attempted to reach daughter to confirm location, but was only able to leave a message. [facility] representative, Smith, [id number] ext [phone]; ref # [id number]    [phone]  CM spoke with daughter regarding location and time. Daughter is requesting an earlier shift and is open to [facility] [place] unit. 1520  CM received a call from [place] with [facility]. CM requested a change to [facility] if they have an earlier chair time available. Smith stated she would make contact with the  and someone will be in touch. CM also contacted [facility] and informed them that patient has decided to go with [facility].     Mari Anderson, [facility],[address]  [phone]

## 2021-12-08 NOTE — PROGRESS NOTES
Cardiology Progress Note  2021     Admit Date: 2021  Admit Diagnosis: Acute renal failure (ARF) ([facility] [N17.9]  CC: none currently    Assessment/Plan:   Doing well on HD with resolution of her dyspnea. Her MPI was normal with no ischemia or infarction. Plans for discharge noted. Will sign off and see prn. Thank you for this referral.  For other plans, see orders. Subjective: Jacki Romero reports   Chest Pain:  [x]  none;  consistent with []  non-cardiac  []  atypical  []  angina             [x]  none now    []  on-going  Dyspnea: [x]  none    []  at rest    []  with exertion   []  improved    []  unchanged    []  worsening  PND:       [x]  none      []  overnight      Orthopnea:   [x]  none        []  improved         []  unchanged        []  worsening  Presyncope: [x]  none        []  improved         []  unchanged        []  worsening  Ambulated in hallway without symptoms  []  Yes  Ambulated in room without symptoms  []  Yes    Objective:    Physical Exam:  Overall VSSAF;    Visit Vitals  BP (!) 114/46   Pulse 85   Temp 98.8 °F (37.1 °C)   Resp 18   Ht 5' 4\" (1.626 m)   Wt 98 kg (216 lb)   SpO2 100%   BMI 37.08 kg/m²     Temp (24hrs), Av.3 °F (36.8 °C), Min:98 °F (36.7 °C), Max:98.8 °F (37.1 °C)    Patient Vitals for the past 8 hrs:   Pulse   21 1400 85   21 1201 98   21 1200 75   21 1000 (!) 107   21 0900 91    Patient Vitals for the past 8 hrs:   Resp   21 1201 18   21 0900 20    Patient Vitals for the past 8 hrs:   BP   21 1201 (!) 114/46   21 0900 (!) 163/64          Intake/Output Summary (Last 24 hours) at 2021 1652  Last data filed at 2021 0900  Gross per 24 hour   Intake --   Output 2000 ml   Net -2000 ml       General Appearance: Well developed, well nourished, no acute distress. Ears/Nose/Mouth/Throat:   Normal MM; anicteric. JVP: WNL   Resp:   Lungs clear to auscultation bilaterally. Nl resp effort. Cardiovascular:  RRR, S1, S2 normal, no new murmur. No gallop or rub. Abdomen:   Soft, non-tender, bowel sounds are present. Extremities: No edema bilaterally. Skin:  Neuro: Warm and dry. A/O x3, grossly nonfocal    []  cath site intact w/o hematoma or bruit; distal pulse unchanged. Data Review:     Telemetry independently reviewed : [x]  sinus      []  chronic afib     []  par afib    []  NSVT    ECG independently reviewed:  []  NSR         []  no significant changes  [] no new ECG provided for review  Lab results reviewed as noted below. Current medications reviewed as noted below. No results for input(s): PH, PCO2, PO2 in the last 72 hours. No results for input(s): CPK, CKMB, TROIQ in the last 72 hours. Recent Labs     12/08/21  0112 12/07/21  0231 12/06/21  0216    138  136 139   K 4.0 HEMOLYZED,RECOLLECT REQUESTED  5.8* 3.8    106  105 106   CO2 21 25  26 25   BUN 25* 21*  21* 33*   CREA 4.44* 3.61*  3.58* 4.61*   * 165*  169* 134*   PHOS  --  3.5  --    CA 8.1* 8.1*  8.1* 7.8*   ALB  --  1.6*  --    WBC 8.9 7.9 7.6   HGB 9.7* 8.9* 9.2*   HCT 31.3* 27.6* 28.8*    294 303     Recent Labs     12/07/21  0231   ALB 1.6*     No results for input(s): INR, PTP, APTT, INREXT in the last 72 hours. No results for input(s): FE, TIBC, PSAT, FERR in the last 72 hours.    Lab Results   Component Value Date/Time    Glucose (POC) 274 (H) 12/08/2021 11:59 AM    Glucose (POC) 131 (H) 12/08/2021 05:23 AM    Glucose (POC) 204 (H) 12/07/2021 09:39 PM    Glucose (POC) 153 (H) 12/07/2021 05:30 PM    Glucose (POC) 193 (H) 12/07/2021 11:23 AM       Current Facility-Administered Medications   Medication Dose Route Frequency    epoetin joni-epbx (RETACRIT) injection 20,000 Units  20,000 Units SubCUTAneous Q MON, WED & FRI    hydrALAZINE (APRESOLINE) tablet 50 mg  50 mg Oral TID    rosuvastatin (CRESTOR) tablet 10 mg  10 mg Oral QHS    0.9% sodium chloride infusion 250 mL  250 mL IntraVENous PRN    0.9% sodium chloride infusion 250 mL  250 mL IntraVENous PRN    oxyCODONE-acetaminophen (PERCOCET) 5-325 mg per tablet 1 Tablet  1 Tablet Oral Q4H PRN    morphine injection 2 mg  2 mg IntraVENous Q4H PRN    heparin (porcine) 1,000 unit/mL injection 1,100 Units  1,100 Units Hemodialysis DIALYSIS PRN    heparin (porcine) 1,000 unit/mL injection 1,400 Units  1,400 Units Hemodialysis DIALYSIS PRN    0.9% sodium chloride infusion 250 mL  250 mL IntraVENous PRN    sodium chloride (NS) flush 5-40 mL  5-40 mL IntraVENous Q8H    sodium chloride (NS) flush 5-40 mL  5-40 mL IntraVENous PRN    acetaminophen (TYLENOL) tablet 650 mg  650 mg Oral Q6H PRN    Or    acetaminophen (TYLENOL) suppository 650 mg  650 mg Rectal Q6H PRN    polyethylene glycol (MIRALAX) packet 17 g  17 g Oral DAILY PRN    ondansetron (ZOFRAN ODT) tablet 4 mg  4 mg Oral Q8H PRN    Or    ondansetron (ZOFRAN) injection 4 mg  4 mg IntraVENous Q6H PRN    albuterol-ipratropium (DUO-NEB) 2.5 MG-0.5 MG/3 ML  3 mL Nebulization Q4H PRN    aspirin tablet 325 mg  325 mg Oral DAILY    metoprolol succinate (TOPROL-XL) XL tablet 100 mg  100 mg Oral DAILY    NIFEdipine ER (PROCARDIA XL) tablet 60 mg  60 mg Oral DAILY    montelukast (SINGULAIR) tablet 10 mg  10 mg Oral QHS    arformoterol 15 mcg/budesonide 0.25 mg neb solution   Nebulization BID RT    insulin lispro (HUMALOG) injection   SubCUTAneous AC&HS    glucose chewable tablet 16 g  4 Tablet Oral PRN    dextrose (D50W) injection syrg 12.5-25 g  12.5-25 g IntraVENous PRN    glucagon (GLUCAGEN) injection 1 mg  1 mg IntraMUSCular PRN        Carlo Lal MD

## 2021-12-09 ENCOUNTER — APPOINTMENT (OUTPATIENT)
Dept: NON INVASIVE DIAGNOSTICS | Age: 66
DRG: 674 | End: 2021-12-09
Attending: INTERNAL MEDICINE
Payer: MEDICARE

## 2021-12-09 LAB
ECHO AO ROOT DIAM: 3.53 CM
ECHO AV AREA PEAK VELOCITY: 2.64 CM2
ECHO AV AREA/BSA PEAK VELOCITY: 1.3 CM2/M2
ECHO AV PEAK GRADIENT: 9.02 MMHG
ECHO AV PEAK VELOCITY: 150.19 CM/S
ECHO LA AREA 4C: 21.66 CM2
ECHO LA MAJOR AXIS: 4.11 CM
ECHO LA MINOR AXIS: 2.04 CM
ECHO LA VOL 2C: 38.21 ML (ref 22–52)
ECHO LA VOL 4C: 63.78 ML (ref 22–52)
ECHO LA VOL BP: 56.92 ML (ref 22–52)
ECHO LA VOL/BSA BIPLANE: 28.32 ML/M2 (ref 16–28)
ECHO LA VOLUME INDEX A2C: 19.01 ML/M2 (ref 16–28)
ECHO LA VOLUME INDEX A4C: 31.73 ML/M2 (ref 16–28)
ECHO LV E' LATERAL VELOCITY: 7.49 CM/S
ECHO LV E' SEPTAL VELOCITY: 6.42 CM/S
ECHO LV INTERNAL DIMENSION DIASTOLIC: 4.4 CM (ref 3.9–5.3)
ECHO LV INTERNAL DIMENSION SYSTOLIC: 3.25 CM
ECHO LV IVSD: 1.12 CM (ref 0.6–0.9)
ECHO LV MASS 2D: 172.2 G (ref 67–162)
ECHO LV MASS INDEX 2D: 85.7 G/M2 (ref 43–95)
ECHO LV POSTERIOR WALL DIASTOLIC: 1.11 CM (ref 0.6–0.9)
ECHO LVOT DIAM: 2.1 CM
ECHO LVOT PEAK GRADIENT: 5.22 MMHG
ECHO LVOT PEAK VELOCITY: 114.28 CM/S
ECHO MV A VELOCITY: 145.59 CM/S
ECHO MV AREA PHT: 2.71 CM2
ECHO MV E DECELERATION TIME (DT): 279.99 MS
ECHO MV E VELOCITY: 104.71 CM/S
ECHO MV E/A RATIO: 0.72
ECHO MV E/E' LATERAL: 13.98
ECHO MV E/E' RATIO (AVERAGED): 15.14
ECHO MV E/E' SEPTAL: 16.31
ECHO MV PRESSURE HALF TIME (PHT): 81.2 MS
ECHO PV MAX VELOCITY: 125.86 CM/S
ECHO PV PEAK INSTANTANEOUS GRADIENT SYSTOLIC: 6.34 MMHG
ECHO RV INTERNAL DIMENSION: 3.57 CM
ECHO RV TAPSE: 2.56 CM (ref 1.5–2)
GLUCOSE BLD STRIP.AUTO-MCNC: 196 MG/DL (ref 65–117)
GLUCOSE BLD STRIP.AUTO-MCNC: 202 MG/DL (ref 65–117)
GLUCOSE BLD STRIP.AUTO-MCNC: 208 MG/DL (ref 65–117)
GLUCOSE BLD STRIP.AUTO-MCNC: 246 MG/DL (ref 65–117)
HBV CORE AB SERPL QL IA: NEGATIVE
SERVICE CMNT-IMP: ABNORMAL

## 2021-12-09 PROCEDURE — 74011000250 HC RX REV CODE- 250: Performed by: HOSPITALIST

## 2021-12-09 PROCEDURE — 65270000029 HC RM PRIVATE

## 2021-12-09 PROCEDURE — 93306 TTE W/DOPPLER COMPLETE: CPT

## 2021-12-09 PROCEDURE — 65660000000 HC RM CCU STEPDOWN

## 2021-12-09 PROCEDURE — 74011250637 HC RX REV CODE- 250/637: Performed by: INTERNAL MEDICINE

## 2021-12-09 PROCEDURE — 94664 DEMO&/EVAL PT USE INHALER: CPT

## 2021-12-09 PROCEDURE — 74011636637 HC RX REV CODE- 636/637: Performed by: HOSPITALIST

## 2021-12-09 PROCEDURE — 82962 GLUCOSE BLOOD TEST: CPT

## 2021-12-09 PROCEDURE — 74011250637 HC RX REV CODE- 250/637: Performed by: HOSPITALIST

## 2021-12-09 PROCEDURE — 94640 AIRWAY INHALATION TREATMENT: CPT

## 2021-12-09 PROCEDURE — 74011250637 HC RX REV CODE- 250/637: Performed by: FAMILY MEDICINE

## 2021-12-09 RX ADMIN — OXYCODONE AND ACETAMINOPHEN 1 TABLET: 5; 325 TABLET ORAL at 13:16

## 2021-12-09 RX ADMIN — ASPIRIN 325 MG ORAL TABLET 325 MG: 325 PILL ORAL at 08:56

## 2021-12-09 RX ADMIN — OXYCODONE AND ACETAMINOPHEN 1 TABLET: 5; 325 TABLET ORAL at 08:56

## 2021-12-09 RX ADMIN — INSULIN LISPRO 3 UNITS: 100 INJECTION, SOLUTION INTRAVENOUS; SUBCUTANEOUS at 16:35

## 2021-12-09 RX ADMIN — MONTELUKAST 10 MG: 10 TABLET, FILM COATED ORAL at 23:16

## 2021-12-09 RX ADMIN — INSULIN LISPRO 2 UNITS: 100 INJECTION, SOLUTION INTRAVENOUS; SUBCUTANEOUS at 23:16

## 2021-12-09 RX ADMIN — ARFORMOTEROL TARTRATE: 15 SOLUTION RESPIRATORY (INHALATION) at 23:02

## 2021-12-09 RX ADMIN — HYDRALAZINE HYDROCHLORIDE 50 MG: 50 TABLET, FILM COATED ORAL at 08:56

## 2021-12-09 RX ADMIN — METOPROLOL SUCCINATE 100 MG: 50 TABLET, EXTENDED RELEASE ORAL at 08:56

## 2021-12-09 RX ADMIN — Medication 10 ML: at 23:15

## 2021-12-09 RX ADMIN — ROSUVASTATIN 10 MG: 10 TABLET, FILM COATED ORAL at 23:16

## 2021-12-09 RX ADMIN — HYDRALAZINE HYDROCHLORIDE 50 MG: 50 TABLET, FILM COATED ORAL at 23:16

## 2021-12-09 RX ADMIN — HYDRALAZINE HYDROCHLORIDE 50 MG: 50 TABLET, FILM COATED ORAL at 16:35

## 2021-12-09 RX ADMIN — INSULIN LISPRO 3 UNITS: 100 INJECTION, SOLUTION INTRAVENOUS; SUBCUTANEOUS at 11:30

## 2021-12-09 RX ADMIN — ARFORMOTEROL TARTRATE: 15 SOLUTION RESPIRATORY (INHALATION) at 08:17

## 2021-12-09 RX ADMIN — INSULIN LISPRO 2 UNITS: 100 INJECTION, SOLUTION INTRAVENOUS; SUBCUTANEOUS at 07:09

## 2021-12-09 RX ADMIN — NIFEDIPINE 60 MG: 30 TABLET, FILM COATED, EXTENDED RELEASE ORAL at 08:56

## 2021-12-09 RX ADMIN — OXYCODONE AND ACETAMINOPHEN 1 TABLET: 5; 325 TABLET ORAL at 23:20

## 2021-12-09 RX ADMIN — OXYCODONE AND ACETAMINOPHEN 1 TABLET: 5; 325 TABLET ORAL at 19:19

## 2021-12-09 NOTE — PROGRESS NOTES
Bedside shift change report given to Chino Bishop RN (oncoming nurse) by MARIAN Mari (offgoing nurse). Report included the following information SBAR, Kardex, Intake/Output, MAR, Accordion and Cardiac Rhythm NSR.

## 2021-12-09 NOTE — PROGRESS NOTES
[facility]   [address] [facility], [address] [place] [address]  Phone: [phone]   [phone]    [email or web address]     Nephrology Progress Note    Patient Name : Emely Nathan      : 1955     MRN : [id number]  Date of Admission : 2021  Date of Servive : 21    CC: Follow up for ARF      Assessment and Plan   BRENNAN on CKD:   - progressive CKD  - started HD on 12/3  - HD MWF for now  - CM to arrange out pt dialysis  - does not want to go to [facility] unit    Nephrotic Range Proteinuria   - 12 gms, no M-spike on SPEP    CKD 4  - progressive CKD - presumed DKD  - baseline Cr 3.9 mg/dl in   - f/b Dr Justo Dominguez at [facility]     Anemia in CKD   - started MADY    Back pain   - ?  R psoas muscle Lipoma     HTN   DM-II, HLD  Obesity      Interval History:  Seen and examined . Tolerating dialysis. No new sx . No cp, sob, n/v/d    Review of Systems: A comprehensive review of systems was negative except for that written in the HPI.     Current Medications:   Current Facility-Administered Medications   Medication Dose Route Frequency    epoetin joni-epbx (RETACRIT) injection 20,000 Units  20,000 Units SubCUTAneous Q MON, WED & FRI    hydrALAZINE (APRESOLINE) tablet 50 mg  50 mg Oral TID    rosuvastatin (CRESTOR) tablet 10 mg  10 mg Oral QHS    0.9% sodium chloride infusion 250 mL  250 mL IntraVENous PRN    0.9% sodium chloride infusion 250 mL  250 mL IntraVENous PRN    oxyCODONE-acetaminophen (PERCOCET) 5-325 mg per tablet 1 Tablet  1 Tablet Oral Q4H PRN    morphine injection 2 mg  2 mg IntraVENous Q4H PRN    heparin (porcine) 1,000 unit/mL injection 1,100 Units  1,100 Units Hemodialysis DIALYSIS PRN    heparin (porcine) 1,000 unit/mL injection 1,400 Units  1,400 Units Hemodialysis DIALYSIS PRN    0.9% sodium chloride infusion 250 mL  250 mL IntraVENous PRN    sodium chloride (NS) flush 5-40 mL  5-40 mL IntraVENous Q8H    sodium chloride (NS) flush 5-40 mL  5-40 mL IntraVENous PRN    acetaminophen (TYLENOL) tablet 650 mg  650 mg Oral Q6H PRN    Or    acetaminophen (TYLENOL) suppository 650 mg  650 mg Rectal Q6H PRN    polyethylene glycol (MIRALAX) packet 17 g  17 g Oral DAILY PRN    ondansetron (ZOFRAN ODT) tablet 4 mg  4 mg Oral Q8H PRN    Or    ondansetron (ZOFRAN) injection 4 mg  4 mg IntraVENous Q6H PRN    albuterol-ipratropium (DUO-NEB) 2.5 MG-0.5 MG/3 ML  3 mL Nebulization Q4H PRN    aspirin tablet 325 mg  325 mg Oral DAILY    metoprolol succinate (TOPROL-XL) XL tablet 100 mg  100 mg Oral DAILY    NIFEdipine ER (PROCARDIA XL) tablet 60 mg  60 mg Oral DAILY    montelukast (SINGULAIR) tablet 10 mg  10 mg Oral QHS    arformoterol 15 mcg/budesonide 0.25 mg neb solution   Nebulization BID RT    insulin lispro (HUMALOG) injection   SubCUTAneous AC&HS    glucose chewable tablet 16 g  4 Tablet Oral PRN    dextrose (D50W) injection syrg 12.5-25 g  12.5-25 g IntraVENous PRN    glucagon (GLUCAGEN) injection 1 mg  1 mg IntraMUSCular PRN      Allergies   Allergen Reactions    Ivp Dye [Fd And C Blue No.1] Hives       Objective:  Vitals:    Vitals:    12/09/21 0441 12/09/21 0600 12/09/21 0810 12/09/21 0817   BP: (!) 143/37  (!) 143/37    Pulse: (!) 101 (!) 103     Resp: 13      Temp: 98.2 °F (36.8 °C)      TempSrc:       SpO2: 96%   95%   Weight: 96.4 kg (212 lb 8.4 oz)  96.2 kg (212 lb)    Height:   5' 4\" (1.626 m)      Intake and Output:  No intake/output data recorded.   12/07 1901 - 12/09 0700  In: -   Out: 2250 [Urine:250]    Physical Examination:        General: Obese   Neck:  RIJ PC in place  Resp:  Lungs clear b/l  CV:  RRR,  no murmur or rub, 1+ LE edema  GI:  Soft, NT, + BS, no HS megaly  Neurologic:  Non focal  Psych:             AAO x 3 appropriate affect   Skin:  No Rash      []    High complexity decision making was performed  []    Patient is at high-risk of decompensation with multiple organ involvement    Lab Data Personally Reviewed: I have reviewed all the pertinent labs, microbiology data and radiology studies during assessment. Recent Labs     12/08/21  0112 12/07/21  0231    138  136   K 4.0 HEMOLYZED,RECOLLECT REQUESTED  5.8*    106  105   CO2 21 25  26   * 165*  169*   BUN 25* 21*  21*   CREA 4.44* 3.61*  3.58*   CA 8.1* 8.1*  8.1*   PHOS  --  3.5   ALB  --  1.6*     Recent Labs     12/08/21  0112 12/07/21  0231   WBC 8.9 7.9   HGB 9.7* 8.9*   HCT 31.3* 27.6*    294     No results found for: [facility]  Lab Results   Component Value Date/Time    Culture result: NO GROWTH 5 DAYS 03/15/2018 06:09 PM     Recent Results (from the past 24 hour(s))   GLUCOSE, POC    Collection Time: 12/08/21 11:59 AM   Result Value Ref Range    Glucose (POC) 274 (H) 65 - 117 mg/dL    Performed by Allison Mari    GLUCOSE, POC    Collection Time: 12/08/21  4:58 PM   Result Value Ref Range    Glucose (POC) 216 (H) 65 - 117 mg/dL    Performed by Allison Mari    GLUCOSE, POC    Collection Time: 12/08/21 10:04 PM   Result Value Ref Range    Glucose (POC) 216 (H) 65 - 117 mg/dL    Performed by Anderson Bence    GLUCOSE, POC    Collection Time: 12/09/21  6:27 AM   Result Value Ref Range    Glucose (POC) 196 (H) 65 - 117 mg/dL    Performed by Edilia Nagel            I have reviewed the flowsheets. Chart and Pertinent Notes have been reviewed. No change in PMH ,family and social history from Consult note.       Yanira Delgadillo [facility]

## 2021-12-09 NOTE — PROGRESS NOTES
[facility] [facility] Adult  Hospitalist Group                                                                                          Hospitalist Progress Note  Nicole Velasquez MD  Answering service: [phone] OR [phone] from in house phone      NAME:  Luis Alberto Cassidy  :  1955  MRN:  [id number]      Admission Summary:   78 y.o lady w/ CAD, CHF, COPD, DM, HTN, asthma, CKD IV, who presents with shortness of breath. Symptoms began at least a week ago. She describes progressive dyspnea worsened with exertion. She denies associated chest pain, fever, n/v/d. She does have a mildly productive cough. Interval history / Subjective:   Patient reports less shortness of breath  Severely elevated creatinine needing dialysis  She is off oxygen today and permacath was placed     Assessment & Plan:     Shortness of breath: this is likely due to pulmonary edema / volume overload in the setting of CKD and CHF. Symptomatic anemia may be contributing. Stable SO2 on RA today     BRENNAN on CKD IV:  -nephrology consulted and orders/recs noted  permacath  placed 21 and started hemodialysis 12/3/21     Anemia: no evidence of bleeding.  Stool is negative for occult blood.   -check iron studies, b12/folate, retics, haptoglobin  -pt transfused 2 units PRBCs     Elevated serum troponin: with EKG changes- consult cardiology for eval and tx recs  stress test done today and negative for ischemia     Asymmetric LE edema: pt states this is chronic  - RLE doppler neg for DVT     Type 2 DM:  -SSI/POC checks     HTN:  -continue metoprolol, nifedipine,  hydralazine     COPD/Asthma: no evidence of bronchospasm  Resume home meds  History of tobacco use, quit 1 month ago    Resp therapy noted evidence of sleep apnea - patient will need formal sleep study after DC and likely needs a CPAP machine    Code status: Full Code  DVT prophylaxis: SCDs    Care Plan discussed with: Patient/Family  Anticipated Disposition: Home w/Family  Anticipated Discharge: Greater than 48 hours     Hospital Problems  Date Reviewed: 4/25/2019          Codes Class Noted POA    Acute renal failure (ARF) ([facility] [address] ICD-10-CM: N17.9  ICD-9-CM: 584.9  12/2/2021 Unknown                Review of Systems:   A comprehensive review of systems was negative except for that written in the HPI. Vital Signs:    Last 24hrs VS reviewed since prior progress note.  Most recent are:  Visit Vitals  BP (!) 153/69   Pulse 94   Temp 98.5 °F (36.9 °C)   Resp 20   Ht 5' 4\" (1.626 m)   Wt 98 kg (216 lb)   SpO2 93%   BMI 37.08 kg/m²     Patient Vitals for the past 24 hrs:   Temp Pulse Resp BP SpO2   12/08/21 2213 -- -- -- -- 93 %   12/08/21 2020 -- 94 -- -- --   12/08/21 1948 -- -- -- (!) 153/69 --   12/08/21 1909 98.5 °F (36.9 °C) 95 20 (!) 174/96 93 %   12/08/21 1800 -- 73 -- -- --   12/08/21 1700 98.5 °F (36.9 °C) 91 18 131/76 98 %   12/08/21 1400 -- 85 -- -- --   12/08/21 1201 98.8 °F (37.1 °C) 98 18 (!) 114/46 100 %   12/08/21 1200 -- 75 -- -- --   12/08/21 1000 -- (!) 107 -- -- --   12/08/21 0900 98.1 °F (36.7 °C) 91 20 (!) 163/64 100 %   12/08/21 0845 -- 92 18 (!) 171/68 100 %   12/08/21 0830 -- 91 17 (!) 169/72 99 %   12/08/21 0815 -- 96 16 (!) 191/79 100 %   12/08/21 0800 98 °F (36.7 °C) (!) 103 18 (!) 180/91 100 %   12/08/21 0752 -- -- -- -- 100 %   12/08/21 0745 -- (!) 102 19 (!) 183/80 100 %   12/08/21 0730 -- (!) 103 17 (!) 180/77 99 %   12/08/21 0715 -- 92 20 (!) 192/78 100 %   12/08/21 0700 -- 95 18 (!) 179/76 99 %   12/08/21 0645 -- 90 17 (!) 172/72 99 %   12/08/21 0630 -- 95 18 (!) 184/74 100 %   12/08/21 0615 -- 100 17 (!) 169/70 100 %   12/08/21 0600 -- 97 18 (!) 172/69 99 %   12/08/21 0545 -- 90 17 (!) 159/66 99 %   12/08/21 0542 -- 93 -- -- --   12/08/21 0530 -- 97 18 (!) 165/80 100 %   12/08/21 0525 98.1 °F (36.7 °C) 84 17 (!) 151/94 99 %   12/08/21 0400 -- (!) 101 -- -- --   12/08/21 0312 98.2 °F (36.8 °C) (!) 101 16 (!) 170/57 100 %   12/08/21 0200 -- 93 -- -- --   12/07/21 2348 98.3 °F (36.8 °C) 98 16 133/61 100 %   12/07/21 2343 98.4 °F (36.9 °C) (!) 101 16 (!) 134/114 100 %       Intake/Output Summary (Last 24 hours) at 12/8/2021 2310  Last data filed at 12/8/2021 0900  Gross per 24 hour   Intake --   Output 2000 ml   Net -2000 ml        Physical Examination:          Constitutional:  No acute distress, cooperative, pleasant    ENT:  Oral mucosa dry. Resp:  decreased breath sounds bilaterally. No wheezing/   CV:  Regular rhythm, normal rate, 3/6 SE murmurs,    GI:  Soft, non distended, non tender. Musculoskeletal:  No edema, warm, 2+ pulses throughout    Neurologic:  Moves all extremities. AAOx3, CN II-XII reviewed            Data Review:    Review and/or order of clinical lab test  Review and/or order of tests in the radiology section of CPT  Review and/or order of tests in the medicine section of [facility]      Labs:     Recent Labs     12/08/21 0112 12/07/21 0231   WBC 8.9 7.9   HGB 9.7* 8.9*   HCT 31.3* 27.6*    294     Recent Labs     12/08/21 0112 12/07/21  0231 12/06/21  0216    138  136 139   K 4.0 HEMOLYZED,RECOLLECT REQUESTED  5.8* 3.8    106  105 106   CO2 21 25  26 25   BUN 25* 21*  21* 33*   CREA 4.44* 3.61*  3.58* 4.61*   * 165*  169* 134*   CA 8.1* 8.1*  8.1* 7.8*   PHOS  --  3.5  --      Recent Labs     12/07/21 0231   ALB 1.6*     No results for input(s): INR, PTP, APTT, INREXT, INREXT in the last 72 hours. No results for input(s): FE, TIBC, PSAT, FERR in the last 72 hours. Lab Results   Component Value Date/Time    Folate 18.1 12/02/2021 08:16 PM      No results for input(s): PH, PCO2, PO2 in the last 72 hours. No results for input(s): CPK, CKNDX, TROIQ in the last 72 hours.     No lab exists for component: CPKMB  Lab Results   Component Value Date/Time    Cholesterol, total 260 (H) 07/02/2018 04:27 AM    HDL Cholesterol 31 07/02/2018 04:27 AM    LDL, calculated 197.2 (H) 07/02/2018 04:27 AM    Triglyceride 159 (H) 07/02/2018 04:27 AM    CHOL/HDL Ratio 8.4 (H) 07/02/2018 04:27 AM     Lab Results   Component Value Date/Time    Glucose (POC) 216 (H) 12/08/2021 10:04 PM    Glucose (POC) 216 (H) 12/08/2021 04:58 PM    Glucose (POC) 274 (H) 12/08/2021 11:59 AM    Glucose (POC) 131 (H) 12/08/2021 05:23 AM    Glucose (POC) 204 (H) 12/07/2021 09:39 PM     Lab Results   Component Value Date/Time    Color YELLOW/STRAW 12/02/2021 11:00 PM    Appearance CLEAR 12/02/2021 11:00 PM    Specific gravity 1.025 12/02/2021 11:00 PM    Specific gravity 1.010 06/08/2011 12:00 AM    pH (UA) 5.5 12/02/2021 11:00 PM    Protein 300 (A) 12/02/2021 11:00 PM    Glucose 500 (A) 12/02/2021 11:00 PM    Ketone Negative 12/02/2021 11:00 PM    Bilirubin Negative 12/02/2021 11:00 PM    Urobilinogen 1.0 12/02/2021 11:00 PM    Nitrites Negative 12/02/2021 11:00 PM    Leukocyte Esterase Negative 12/02/2021 11:00 PM    Epithelial cells FEW 12/02/2021 11:00 PM    Bacteria Negative 12/02/2021 11:00 PM    WBC 0-4 12/02/2021 11:00 PM    RBC 0-5 12/02/2021 11:00 PM         Medications Reviewed:     Current Facility-Administered Medications   Medication Dose Route Frequency    epoetin joni-epbx (RETACRIT) injection 20,000 Units  20,000 Units SubCUTAneous Q MON, WED & FRI    hydrALAZINE (APRESOLINE) tablet 50 mg  50 mg Oral TID    rosuvastatin (CRESTOR) tablet 10 mg  10 mg Oral QHS    0.9% sodium chloride infusion 250 mL  250 mL IntraVENous PRN    0.9% sodium chloride infusion 250 mL  250 mL IntraVENous PRN    oxyCODONE-acetaminophen (PERCOCET) 5-325 mg per tablet 1 Tablet  1 Tablet Oral Q4H PRN    morphine injection 2 mg  2 mg IntraVENous Q4H PRN    heparin (porcine) 1,000 unit/mL injection 1,100 Units  1,100 Units Hemodialysis DIALYSIS PRN    heparin (porcine) 1,000 unit/mL injection 1,400 Units  1,400 Units Hemodialysis DIALYSIS PRN    0.9% sodium chloride infusion 250 mL  250 mL IntraVENous PRN    sodium chloride (NS) flush 5-40 mL  5-40 mL IntraVENous Q8H    sodium chloride (NS) flush 5-40 mL  5-40 mL IntraVENous PRN    acetaminophen (TYLENOL) tablet 650 mg  650 mg Oral Q6H PRN    Or    acetaminophen (TYLENOL) suppository 650 mg  650 mg Rectal Q6H PRN    polyethylene glycol (MIRALAX) packet 17 g  17 g Oral DAILY PRN    ondansetron (ZOFRAN ODT) tablet 4 mg  4 mg Oral Q8H PRN    Or    ondansetron (ZOFRAN) injection 4 mg  4 mg IntraVENous Q6H PRN    albuterol-ipratropium (DUO-NEB) 2.5 MG-0.5 MG/3 ML  3 mL Nebulization Q4H PRN    aspirin tablet 325 mg  325 mg Oral DAILY    metoprolol succinate (TOPROL-XL) XL tablet 100 mg  100 mg Oral DAILY    NIFEdipine ER (PROCARDIA XL) tablet 60 mg  60 mg Oral DAILY    montelukast (SINGULAIR) tablet 10 mg  10 mg Oral QHS    arformoterol 15 mcg/budesonide 0.25 mg neb solution   Nebulization BID RT    insulin lispro (HUMALOG) injection   SubCUTAneous AC&HS    glucose chewable tablet 16 g  4 Tablet Oral PRN    dextrose (D50W) injection syrg 12.5-25 g  12.5-25 g IntraVENous PRN    glucagon (GLUCAGEN) injection 1 mg  1 mg IntraMUSCular PRN     ______________________________________________________________________  EXPECTED LENGTH OF STAY: 3d 19h  ACTUAL LENGTH OF STAY:          6                 lCarissa Valero MD

## 2021-12-09 NOTE — PROGRESS NOTES
[address] [facility] Adult  Hospitalist Group                                                                                          Hospitalist Progress Note  Karol Perez MD  Answering service: [phone] OR [phone] from in house phone      NAME:  Nghia Cruz  :  1955  MRN:  [id number]      Admission Summary:   78 y.o lady w/ CAD, CHF, COPD, DM, HTN, asthma, CKD IV, who presents with shortness of breath. Symptoms began at least a week ago. She describes progressive dyspnea worsened with exertion. She denies associated chest pain, fever, n/v/d. She does have a mildly productive cough. Interval history / Subjective:   Pt is feeling ok. Some SOB on exertion, feeling better at rest.  No other concerns. D/w CM. Awaiting outpt HD chair arrangement with Flora at [facility] unit. No HD today, next HD tomorrow per renal.     Assessment & Plan:     Shortness of breath: this is likely due to pulmonary edema / volume overload in the setting of CKD and CHF. Symptomatic anemia may be contributing. Stable SO2 on RA  Still has some TIPTON but overall better.      BRENNAN on CKD IV: initiated on HD, s/p Permacath placed on 21  -nephrology consulted and orders/recs noted  -permacath  placed 21 and started hemodialysis 12/3/21  -Awaiting outpatient HD chair arrangement     Anemia: no evidence of bleeding. Stool is negative for occult blood.   -check iron studies, b12/folate, retics, haptoglobin  -pt transfused 2 units PRBCs, Hb stable     Elevated serum troponin: with EKG changes- consult cardiology for eval and tx recs  -stress test done , EF 61%, negative for ischemia  -Echo done , pending report  -Cardiology consulted. Appreciate. No further work up warranted.  Signed off   -Outpatient cardiology follow up as necessary     Asymmetric LE edema: pt states this is chronic  - RLE doppler neg for DVT     Type 2 DM:  -SSI/POC checks     HTN:  -continue metoprolol, nifedipine,  hydralazine     COPD/Asthma: no evidence of bronchospasm  Resume home meds  History of tobacco use, quit 1 month ago    Resp therapy noted evidence of sleep apnea - patient will need formal sleep study after DC and likely needs a CPAP machine    Code status: Full Code  DVT prophylaxis: SCDs    Care Plan discussed with: Patient/Family  Anticipated Disposition: Home w/Family  Anticipated Discharge: 24hrs, awaiting outpatient HD chair arrangement and Echo report     Hospital Problems  Date Reviewed: 4/25/2019          Codes Class Noted POA    Acute renal failure (ARF) ([facility] [address] ICD-10-CM: N17.9  ICD-9-CM: 584.9  12/2/2021 Unknown                Review of Systems:   A comprehensive review of systems was negative except for that written in the HPI. Vital Signs:    Last 24hrs VS reviewed since prior progress note.  Most recent are:  Visit Vitals  BP (!) 140/76 (BP 1 Location: Right upper arm, BP Patient Position: At rest)   Pulse 96   Temp 98.8 °F (37.1 °C)   Resp 20   Ht 5' 4\" (1.626 m)   Wt 96.2 kg (212 lb)   SpO2 100%   BMI 36.39 kg/m²     Patient Vitals for the past 24 hrs:   Temp Pulse Resp BP SpO2   12/09/21 1029 -- 96 -- -- --   12/09/21 0849 98.8 °F (37.1 °C) 100 20 (!) 140/76 100 %   12/09/21 0817 -- -- -- -- 95 %   12/09/21 0810 -- -- -- (!) 143/37 --   12/09/21 0600 -- (!) 103 -- -- --   12/09/21 0441 98.2 °F (36.8 °C) (!) 101 13 (!) 143/37 96 %   12/09/21 0400 -- 85 -- -- --   12/09/21 0200 -- 95 -- -- --   12/09/21 0028 -- (!) 102 -- -- --   12/09/21 0026 98.7 °F (37.1 °C) 99 18 (!) 147/74 98 %   12/08/21 2222 -- 95 -- -- --   12/08/21 2213 -- -- -- -- 93 %   12/08/21 2020 -- 94 -- -- --   12/08/21 1948 -- -- -- (!) 153/69 --   12/08/21 1909 98.5 °F (36.9 °C) 95 20 (!) 174/96 93 %   12/08/21 1800 -- 73 -- -- --   12/08/21 1700 98.5 °F (36.9 °C) 91 18 131/76 98 %   12/08/21 1400 -- 85 -- -- --   12/08/21 1201 98.8 °F (37.1 °C) 98 18 (!) 114/46 100 %   12/08/21 1200 -- 75 -- -- --       Intake/Output Summary (Last 24 hours) at 12/9/2021 [address] filed at 12/8/2021 1948  Gross per 24 hour   Intake --   Output 250 ml   Net -250 ml        Physical Examination:          Constitutional:  No acute distress, cooperative, pleasant    ENT:  Oral mucosa dry. Resp:  decreased breath sounds bilaterally. No wheezing/rhonchi   CV:  Regular rhythm, normal rate, 3/6 SE murmurs,    GI:  Soft, non distended, non tender. Musculoskeletal:  No edema, warm, 2+ pulses throughout    Neurologic:  Moves all extremities. AAOx3, CN II-XII reviewed            Data Review:    Review and/or order of clinical lab test  Review and/or order of tests in the radiology section of [facility]  Review and/or order of tests in the medicine section of [facility]    Radiology  IR INSERT TUNL CVC W/O PORT OVER 5 YR    Result Date: 12/7/2021  Successful tunneled right sided dialysis catheter placement as described above. The catheter is functioning and is ready for use. NM Cardiac stress test 12/6/21:  Interpretation Summary  · Baseline ECG: Sinus rhythm, non-specific ST-T wave abnormalities. · Stress test: Stress EKG normal. Exercise stress test: EKG stress test results correlate with a low risk of inducible myocardial ischemia. Labs:     Recent Labs     12/08/21 0112 12/07/21  0231   WBC 8.9 7.9   HGB 9.7* 8.9*   HCT 31.3* 27.6*    294     Recent Labs     12/08/21  0112 12/07/21 0231    138  136   K 4.0 HEMOLYZED,RECOLLECT REQUESTED  5.8*    106  105   CO2 21 25  26   BUN 25* 21*  21*   CREA 4.44* 3.61*  3.58*   * 165*  169*   CA 8.1* 8.1*  8.1*   PHOS  --  3.5     Recent Labs     12/07/21  0231   ALB 1.6*     No results for input(s): INR, PTP, APTT, INREXT, INREXT in the last 72 hours. No results for input(s): FE, TIBC, PSAT, FERR in the last 72 hours. Lab Results   Component Value Date/Time    Folate 18.1 12/02/2021 08:16 PM      No results for input(s): PH, PCO2, PO2 in the last 72 hours.   No results for input(s): CPK, CKNDX, KRISTEN in the last 72 hours.     No lab exists for component: CPKMB  Lab Results   Component Value Date/Time    Cholesterol, total 260 (H) 07/02/2018 04:27 AM    HDL Cholesterol 31 07/02/2018 04:27 AM    LDL, calculated 197.2 (H) 07/02/2018 04:27 AM    Triglyceride 159 (H) 07/02/2018 04:27 AM    CHOL/HDL Ratio 8.4 (H) 07/02/2018 04:27 AM     Lab Results   Component Value Date/Time    Glucose (POC) 196 (H) 12/09/2021 06:27 AM    Glucose (POC) 216 (H) 12/08/2021 10:04 PM    Glucose (POC) 216 (H) 12/08/2021 04:58 PM    Glucose (POC) 274 (H) 12/08/2021 11:59 AM    Glucose (POC) 131 (H) 12/08/2021 05:23 AM     Lab Results   Component Value Date/Time    Color YELLOW/STRAW 12/02/2021 11:00 PM    Appearance CLEAR 12/02/2021 11:00 PM    Specific gravity 1.025 12/02/2021 11:00 PM    Specific gravity 1.010 06/08/2011 12:00 AM    pH (UA) 5.5 12/02/2021 11:00 PM    Protein 300 (A) 12/02/2021 11:00 PM    Glucose 500 (A) 12/02/2021 11:00 PM    Ketone Negative 12/02/2021 11:00 PM    Bilirubin Negative 12/02/2021 11:00 PM    Urobilinogen 1.0 12/02/2021 11:00 PM    Nitrites Negative 12/02/2021 11:00 PM    Leukocyte Esterase Negative 12/02/2021 11:00 PM    Epithelial cells FEW 12/02/2021 11:00 PM    Bacteria Negative 12/02/2021 11:00 PM    WBC 0-4 12/02/2021 11:00 PM    RBC 0-5 12/02/2021 11:00 PM         Medications Reviewed:     Current Facility-Administered Medications   Medication Dose Route Frequency    epoetin joni-epbx (RETACRIT) injection 20,000 Units  20,000 Units SubCUTAneous Q MON, WED & FRI    hydrALAZINE (APRESOLINE) tablet 50 mg  50 mg Oral TID    rosuvastatin (CRESTOR) tablet 10 mg  10 mg Oral QHS    0.9% sodium chloride infusion 250 mL  250 mL IntraVENous PRN    0.9% sodium chloride infusion 250 mL  250 mL IntraVENous PRN    oxyCODONE-acetaminophen (PERCOCET) 5-325 mg per tablet 1 Tablet  1 Tablet Oral Q4H PRN    morphine injection 2 mg  2 mg IntraVENous Q4H PRN    heparin (porcine) 1,000 unit/mL injection 1,100 Units  1,100 Units Hemodialysis DIALYSIS PRN    heparin (porcine) 1,000 unit/mL injection 1,400 Units  1,400 Units Hemodialysis DIALYSIS PRN    0.9% sodium chloride infusion 250 mL  250 mL IntraVENous PRN    sodium chloride (NS) flush 5-40 mL  5-40 mL IntraVENous Q8H    sodium chloride (NS) flush 5-40 mL  5-40 mL IntraVENous PRN    acetaminophen (TYLENOL) tablet 650 mg  650 mg Oral Q6H PRN    Or    acetaminophen (TYLENOL) suppository 650 mg  650 mg Rectal Q6H PRN    polyethylene glycol (MIRALAX) packet 17 g  17 g Oral DAILY PRN    ondansetron (ZOFRAN ODT) tablet 4 mg  4 mg Oral Q8H PRN    Or    ondansetron (ZOFRAN) injection 4 mg  4 mg IntraVENous Q6H PRN    albuterol-ipratropium (DUO-NEB) 2.5 MG-0.5 MG/3 ML  3 mL Nebulization Q4H PRN    aspirin tablet 325 mg  325 mg Oral DAILY    metoprolol succinate (TOPROL-XL) XL tablet 100 mg  100 mg Oral DAILY    NIFEdipine ER (PROCARDIA XL) tablet 60 mg  60 mg Oral DAILY    montelukast (SINGULAIR) tablet 10 mg  10 mg Oral QHS    arformoterol 15 mcg/budesonide 0.25 mg neb solution   Nebulization BID RT    insulin lispro (HUMALOG) injection   SubCUTAneous AC&HS    glucose chewable tablet 16 g  4 Tablet Oral PRN    dextrose (D50W) injection syrg 12.5-25 g  12.5-25 g IntraVENous PRN    glucagon (GLUCAGEN) injection 1 mg  1 mg IntraMUSCular PRN     ______________________________________________________________________  EXPECTED LENGTH OF STAY: 3d 19h  ACTUAL LENGTH OF STAY:          7                 Brianna Negron MD

## 2021-12-09 NOTE — PROGRESS NOTES
0800 Bedside shift change report given to April Rn   (oncoming nurse) by Ruth Celaya (offgoing nurse).  Report included the following information SBAR, Kardex, ED Summary, OR Summary, Procedure Summary, Intake/Output, MAR, Accordion, Recent Results, Med Rec Status, and Cardiac Rhythm NSR

## 2021-12-09 NOTE — PROGRESS NOTES
PRIMITIVO: Anticipate discharge home on new dialysis at a [facility] located at [address]--pending receipt of Hep B Core result. Transportation likely in car with daughter.     RUR: 12%     Disposition: Cm received a call from [address], [address] She stated [facility] could accept patient for a second shift chair time between 10-11:45am, but patient would be required to switch nephrologists to the practice at [facility]. [address] spoke with patient's daughter, Pastor Nam, who will be providing transportation to and from dialysis. She stated they are not willing to change nephrologists.  Cyril requested that CM inquire as to chair availability at the [place] and [place] locations. CM contacted [facility] representative who will look into options and return the call. 1530  CM received a call from [address] who stated [facility] Hale[facility]anco [facility] [address] [facility]. Daughter is agreeable to that location, but is hopeful that the chair time will be closer to 3:30pm.    1555  CM spoke with [address] and was able to switch patient back to the [facility] location at [place] [address].  Chair time will be between 3:30-5:00pm.    [facility] representative, [place] [phone] ext [phone]; ref # [id number]    Emmy Be, [address] [place] [address],[address]  [phone]

## 2021-12-09 NOTE — PROGRESS NOTES
No HD plans today   Next HD tomorrow       [facility]  Office :[phone]  Fax: [phone]

## 2021-12-10 VITALS
SYSTOLIC BLOOD PRESSURE: 122 MMHG | BODY MASS INDEX: 36.19 KG/M2 | TEMPERATURE: 98 F | WEIGHT: 212 LBS | HEIGHT: 64 IN | HEART RATE: 90 BPM | DIASTOLIC BLOOD PRESSURE: 56 MMHG | RESPIRATION RATE: 16 BRPM | OXYGEN SATURATION: 97 %

## 2021-12-10 LAB
GLUCOSE BLD STRIP.AUTO-MCNC: 159 MG/DL (ref 65–117)
GLUCOSE BLD STRIP.AUTO-MCNC: 175 MG/DL (ref 65–117)
GLUCOSE BLD STRIP.AUTO-MCNC: 257 MG/DL (ref 65–117)
SERVICE CMNT-IMP: ABNORMAL

## 2021-12-10 PROCEDURE — 94640 AIRWAY INHALATION TREATMENT: CPT

## 2021-12-10 PROCEDURE — 74011250637 HC RX REV CODE- 250/637: Performed by: FAMILY MEDICINE

## 2021-12-10 PROCEDURE — 74011250637 HC RX REV CODE- 250/637: Performed by: HOSPITALIST

## 2021-12-10 PROCEDURE — 90935 HEMODIALYSIS ONE EVALUATION: CPT

## 2021-12-10 PROCEDURE — 82962 GLUCOSE BLOOD TEST: CPT

## 2021-12-10 PROCEDURE — 74011250636 HC RX REV CODE- 250/636: Performed by: INTERNAL MEDICINE

## 2021-12-10 PROCEDURE — 74011636637 HC RX REV CODE- 636/637: Performed by: HOSPITALIST

## 2021-12-10 PROCEDURE — 74011000250 HC RX REV CODE- 250: Performed by: HOSPITALIST

## 2021-12-10 RX ORDER — ROSUVASTATIN CALCIUM 20 MG/1
10 TABLET, COATED ORAL
Qty: 30 TABLET | Refills: 0 | Status: SHIPPED | OUTPATIENT
Start: 2021-12-10

## 2021-12-10 RX ORDER — HYDRALAZINE HYDROCHLORIDE 50 MG/1
50 TABLET, FILM COATED ORAL 3 TIMES DAILY
Qty: 90 TABLET | Refills: 0 | Status: SHIPPED | OUTPATIENT
Start: 2021-12-10

## 2021-12-10 RX ADMIN — METOPROLOL SUCCINATE 100 MG: 50 TABLET, EXTENDED RELEASE ORAL at 10:54

## 2021-12-10 RX ADMIN — HYDRALAZINE HYDROCHLORIDE 50 MG: 50 TABLET, FILM COATED ORAL at 10:55

## 2021-12-10 RX ADMIN — EPOETIN ALFA-EPBX 20000 UNITS: 20000 INJECTION, SOLUTION INTRAVENOUS; SUBCUTANEOUS at 19:54

## 2021-12-10 RX ADMIN — ASPIRIN 325 MG ORAL TABLET 325 MG: 325 PILL ORAL at 10:55

## 2021-12-10 RX ADMIN — OXYCODONE AND ACETAMINOPHEN 1 TABLET: 5; 325 TABLET ORAL at 06:58

## 2021-12-10 RX ADMIN — HEPARIN SODIUM 1100 UNITS: 1000 INJECTION, SOLUTION INTRAVENOUS; SUBCUTANEOUS at 18:05

## 2021-12-10 RX ADMIN — ARFORMOTEROL TARTRATE: 15 SOLUTION RESPIRATORY (INHALATION) at 07:14

## 2021-12-10 RX ADMIN — NIFEDIPINE 60 MG: 30 TABLET, FILM COATED, EXTENDED RELEASE ORAL at 10:55

## 2021-12-10 RX ADMIN — OXYCODONE AND ACETAMINOPHEN 1 TABLET: 5; 325 TABLET ORAL at 10:55

## 2021-12-10 RX ADMIN — INSULIN LISPRO 2 UNITS: 100 INJECTION, SOLUTION INTRAVENOUS; SUBCUTANEOUS at 06:55

## 2021-12-10 RX ADMIN — Medication 10 ML: at 14:00

## 2021-12-10 RX ADMIN — HEPARIN SODIUM 1400 UNITS: 1000 INJECTION INTRAVENOUS; SUBCUTANEOUS at 18:06

## 2021-12-10 RX ADMIN — OXYCODONE AND ACETAMINOPHEN 1 TABLET: 5; 325 TABLET ORAL at 17:42

## 2021-12-10 NOTE — PROGRESS NOTES
TRANSFER - OUT REPORT:    Verbal report given to RN(name) on Jacki Romero  being transferred to (unit) for routine progression of care       Report consisted of patients Situation, Background, Assessment and   Recommendations(SBAR). Information from the following report(s) SBAR, Kardex, Intake/Output, MAR, Accordion and Cardiac Rhythm NSR was reviewed with the receiving nurse. Lines:   Peripheral IV 12/02/21 Left Antecubital (Active)   Site Assessment Clean, dry, & intact 12/09/21 1130   Phlebitis Assessment 0 12/09/21 1130   Infiltration Assessment 0 12/09/21 1130   Dressing Status Clean, dry, & intact 12/09/21 1130   Dressing Type Transparent; Tape 12/09/21 0849   Hub Color/Line Status Pink; Flushed; Capped 12/09/21 0849   Action Taken Open ports on tubing capped 12/09/21 0849   Alcohol Cap Used Yes 12/09/21 0849        Opportunity for questions and clarification was provided.       Patient transported with:   [employer]

## 2021-12-10 NOTE — PROGRESS NOTES
PRIMITIVO: Anticipate discharge home with HD at [facility] on [address] [facility]. Transportation in car daughter. RUR: 11%    Disposition:  Cm [facility]  with [facility]. She stated that patient has been accepted for a 3pm chair time with a start date of 12/13. CM left a message for current CM on 5S.     Tahira Mcmanus, [address] [place] [address],[address]  [phone]

## 2021-12-10 NOTE — DISCHARGE SUMMARY
Discharge Summary       PATIENT ID: Osiel Guerra  MRN: [id number]   YOB: 1955    DATE OF ADMISSION: 12/2/2021  9:35 PM    DATE OF DISCHARGE: 12/10/21   PRIMARY CARE PROVIDER: Agapito Huntley MD     ATTENDING PHYSICIAN: Arcadio Peters MD  DISCHARGING PROVIDER: Arcadio Peters MD    To contact this individual call [phone] and ask the  to page. If unavailable ask to be transferred the Adult Hospitalist Department. CONSULTATIONS: IP CONSULT TO NEPHROLOGY  IP CONSULT TO CARDIOLOGY    PROCEDURES/SURGERIES: * No surgery found *    ADMITTING DIAGNOSES & HOSPITAL COURSE:   SOB  BRENNAN on CKD IV  Anemia  Elevated serum troponin  Asymmetric LE edema  DM2  HTN  COPD  Asthma  Tobacco use    Admission Summary:   78 y.o lady w/ CAD, CHF, COPD, DM, HTN, asthma, CKD IV, who presents with shortness of breath. Symptoms began at least a week ago. She describes progressive dyspnea worsened with exertion. She denies associated chest pain, fever, n/v/d. She does have a mildly productive cough.     Interval history / Subjective:   Resting in bed. No worsening of SOB, however not completely resolved. Asked if she can get Rolator walker with seat due to activity intolerance. D/w CM. Awaiting outpt HD chair arrangement. HD today. DISCHARGE DIAGNOSES / PLAN:      Shortness of breath: this is likely due to pulmonary edema / volume overload in the setting of CKD and CHF. Symptomatic anemia may be contributing.  -Stable SO2 on RA  -Still has some TIPTON but overall getting better.  -Feels she would be better and safer if she could get walker with seat, due to to activity tolerance limitation with shortness of breath.      BRENNAN on CKD IV: initiated on HD, s/p Permacath placed on 12/7/21  -Nephrology consulted and orders/recs noted  -Permacath  placed 12/7/21 and started hemodialysis 12/3/21  -Awaiting outpatient HD chair arrangement, hopefully today     Anemia: no evidence of bleeding.  Stool is negative for occult blood.   -check iron studies, b12/folate, retics, haptoglobin  -Pt transfused 2 units PRBCs, Hb stable     Elevated serum troponin: with EKG changes- consult cardiology for eval and tx recs  -Stress test done 12/6, EF 61%, negative for ischemia  -Echo done 12/9, noted. -Cardiology consulted. Appreciate. No further work up warranted. Signed off 12/8  -Outpatient cardiology follow up as necessary     Asymmetric LE edema: pt states this is chronic  -RLE doppler neg for DVT     Type 2 DM:  -SSI/POC checks     HTN:  -Continue metoprolol, nifedipine,  hydralazine     COPD/Asthma: no evidence of bronchospasm  -Resume home meds  -History of tobacco use, quit 1 month ago     Resp therapy noted evidence of sleep apnea - patient will need formal sleep study after DC and likely needs a CPAP machine     Code status: Full Code  DVT prophylaxis: SCDs     Care Plan discussed with: Patient/Family  Anticipated Disposition: Home w/Family  Anticipated Discharge: 24hrs, awaiting outpatient HD chair arrangement  Update[phone], starting on Monday Wednesday Friday, at 4 PM     ADDITIONAL CARE RECOMMENDATIONS:   Follow up as noted in the appointments. PCP f/up with CBC, CMP/BMP check in 1 week. Please call office of other specialists involved in your care for scheduling/ verifying appointments and for any Q/concerns. · It is important that you take the medication exactly as they are prescribed. · Keep your medication in the bottles provided by the pharmacist and keep a list of the medication names, dosages, and times to be taken in your wallet. · Do not take other medications without consulting your doctor. · No drinking alcohol or driving car or operating machinery if you are on narcotic pain medications. Donot take sedating mediations if you are sleepy or confused.    · Fall Precautions  · Keep Well Hydrated  · Report to your medical provider if you feel you have  developed allergies to medications  · Follow up with your PCP or Consultant for medication adjustments and refills  · Monitor for signs of fevers,chills,bleeding,chest pain and seek medical attention if you do so.          DIET: Cardiac Diet and Diabetic Diet     ACTIVITY: Activity as tolerated     WOUND CARE: N/A     EQUIPMENT needed: N/A    PENDING TEST RESULTS:   At the time of discharge the following test results are still pending: none    FOLLOW UP APPOINTMENTS:    Follow-up Information     Follow up With Specialties Details Why Contact Info    [address] [place] [facility] Services  [facility] will deliver the rollator to your home [facility]  [address] [facility], [address], [address] [facility] [address] [facility]  [address]      Sunday [place], [address] [facility] Vascular Surgery, Interventional Cardiology, Cardiology Go on 12/16/2021 at 11 am on 12/16/21 [address]      [employer] [facility] on 12/13/2021 for dialysis on Mondays, Wednesdays, and Fridays at 4 PM. Please arrive at 3 PM on Monday, 12/13. Address: [place] [address] [place] [address]  Phone: [phone]      Ramon Strange MD [facility] Medicine Schedule an appointment as soon as possible for a visit in 1 week PCP: Please call office to schedule post hospital discharge follow-up within 1 week [facility] & [address]  [address] [place] [address]      Arely Vang MD Nephrology  Nephrology: Please call your kidney doctors office for any concerns or questions related to your dialysis or kidneys [id number] [facility]  [phone]               DISCHARGE MEDICATIONS:  Current Discharge Medication List      START taking these medications    Details   epoetin joni-epbx (RETACRIT) 20,000 unit/mL injection 1 mL by SubCUTAneous route every Monday, Wednesday, Friday.  Indications: anemia due to kidney failure  Indications: anemia due to kidney failure  Qty: 30 Each, Refills: 0  Start date: 12/10/2021      hydrALAZINE (APRESOLINE) 50 mg tablet Take 1 Tablet by mouth three (3) times daily. Qty: 90 Tablet, Refills: 0  Start date: 12/10/2021         CONTINUE these medications which have CHANGED    Details   rosuvastatin (CRESTOR) 20 mg tablet Take 0.5 Tablets by mouth nightly. Take 1 Tab by mouth nightly. Dose reduced from 20 mg to 10 mg.  Qty: 30 Tablet, Refills: 0  Start date: 12/10/2021         CONTINUE these medications which have NOT CHANGED    Details   mometasone-formoterol (DULERA) 200-5 mcg/actuation HFA inhaler Take 2 Puffs by inhalation two (2) times a day. NIFEdipine ER (PROCARDIA XL) 60 mg ER tablet Take 1 Tab by mouth daily. Qty: 30 Tab, Refills: 0      insulin NPH/insulin regular (NOVOLIN 70/30 U-100 INSULIN) 100 unit/mL (70-30) injection 45 Units by SubCUTAneous route two (2) times a day. Qty: 10 mL, Refills: 0      fluticasone-salmeterol (ADVAIR DISKUS) 250-50 mcg/dose diskus inhaler Take 1 Puff by inhalation two (2) times daily as needed. metoprolol succinate (TOPROL XL) 100 mg tablet Take 100 mg by mouth daily. albuterol-ipratropium (DUO-NEB) 2.5 mg-0.5 mg/3 ml nebu 3 mL by Nebulization route every six (6) hours as needed. Qty: 30 Nebule, Refills: 0      montelukast (SINGULAIR) 10 mg tablet Take 1 Tab by mouth nightly. Qty: 30 Tab, Refills: 0      aspirin (ASPIRIN) 325 mg tablet Take 325 mg by mouth daily. STOP taking these medications       furosemide (LASIX) 40 mg tablet Comments:   Reason for Stopping:                 NOTIFY YOUR PHYSICIAN FOR ANY OF THE FOLLOWING:   Fever over 101 degrees for 24 hours. Chest pain, shortness of breath, fever, chills, nausea, vomiting, diarrhea, change in mentation, falling, weakness, bleeding. Severe pain or pain not relieved by medications. Or, any other signs or symptoms that you may have questions about.     DISPOSITION:  x Home With:   OT  PT  HH  RN       Long term SNF/Inpatient Rehab    Independent/assisted living    Hospice    Other:       PATIENT CONDITION AT DISCHARGE:     Functional status    Poor     Deconditioned    x Independent      Cognition   x  Lucid     Forgetful     Dementia      Catheters/lines (plus indication)    Victor     PICC     PEG    x None      Code status   x  Full code     DNR      PHYSICAL EXAMINATION AT DISCHARGE:  Visit Vitals  BP (!) 122/56   Pulse 90   Temp 98 °F (36.7 °C)   Resp 16   Ht 5' 4\" (1.626 m)   Wt 96.2 kg (212 lb)   SpO2 97%   BMI 36.39 kg/m²       Constitutional:  No acute distress, cooperative, pleasant    ENT:  Oral mucosa dry. Resp:  Unchanged reduced AE b/l, No wheezing/rhonchi   CV:  Regular rhythm, normal rate, 3/6 SE murmurs,    GI:  Soft, non distended, non tender. Musculoskeletal:  No edema, warm, 2+ pulses throughout    Neurologic:  Moves all extremities.   AAOx3, CN II-XII reviewed         CHRONIC MEDICAL DIAGNOSES:  Problem List as of 12/10/2021 Date Reviewed: 4/25/2019          Codes Class Noted - Resolved    Chest pain at rest ICD-10-CM: R07.9  ICD-9-CM: 786.50  2/21/2014 - Present        ASHD (arteriosclerotic heart disease) ICD-10-CM: I25.10  ICD-9-CM: 414.00  2/21/2014 - Present        Diabetes mellitus ([facility] ICD-10-CM: E11.9  ICD-9-CM: 250.00  2/21/2014 - Present        Hypertension, benign ICD-10-CM: I10  ICD-9-CM: 401.1  2/21/2014 - Present        ASO (arteriosclerosis obliterans) ICD-10-CM: I70.90  ICD-9-CM: 440.9  2/21/2014 - Present        Hypercholesteremia ICD-10-CM: E78.00  ICD-9-CM: 272.0  2/21/2014 - Present        Acute renal failure (ARF) ([facility] ICD-10-CM: N17.9  ICD-9-CM: 584.9  12/2/2021 - Present        NSTEMI (non-ST elevated myocardial infarction) ([facility] ICD-10-CM: I21.4  ICD-9-CM: 410.70  4/25/2019 - Present        Bilateral leg edema ICD-10-CM: R60.0  ICD-9-CM: 782.3  3/15/2018 - Present        RESOLVED: CHF, acute on chronic ([facility] ICD-10-CM: I50.9  ICD-9-CM: 428.0  7/2/2018 - 7/9/2018        RESOLVED: Fever ICD-10-CM: R50.9  ICD-9-CM: 780.60  3/15/2018 - 3/19/2018        RESOLVED: SIRS (systemic inflammatory response syndrome) (HCC) ICD-10-CM: R65.10  ICD-9-CM: 995.90  3/15/2018 - 3/19/2018        RESOLVED: Asthma exacerbation ICD-10-CM: J45.901  ICD-9-CM: 493.92  3/15/2018 - 3/19/2018            Radiology  IR INSERT TUNL CVC W/O PORT OVER 5 YR    Result Date: 12/7/2021  Successful tunneled right sided dialysis catheter placement as described above. The catheter is functioning and is ready for use.     Labs:  Recent Results (from the past 24 hour(s))   GLUCOSE, POC    Collection Time: 12/09/21 11:06 PM   Result Value Ref Range    Glucose (POC) 202 (H) 65 - 117 mg/dL    Performed by Sirisha Carbon, POC    Collection Time: 12/10/21  6:24 AM   Result Value Ref Range    Glucose (POC) 175 (H) 65 - 117 mg/dL    Performed by Sirisha Carbon, POC    Collection Time: 12/10/21 11:59 AM   Result Value Ref Range    Glucose (POC) 257 (H) 65 - 117 mg/dL    Performed by Jonathan Card (PCT)    GLUCOSE, POC    Collection Time: 12/10/21  5:47 PM   Result Value Ref Range    Glucose (POC) 159 (H) 65 - 117 mg/dL    Performed by Susan Luna 17 than 40 minutes were spent with the patient on counseling and coordination of care    Signed:   Michela Douglass MD  12/10/2021  5:05 PM

## 2021-12-10 NOTE — PROCEDURES
Hemodialysis / [phone]    Vitals Pre Post Assessment Pre Post   BP BP: (!) 143/73 (12/10/21 1408) 122/56 LOC A&OX4 A&OX4   HR Pulse (Heart Rate): 89 (12/10/21 1408) 90 Lungs diminished diminished   Resp Resp Rate: 16 (12/10/21 1408) 16 Cardiac HRR HRR   Temp Temp: 98.3 °F (36.8 °C) (12/10/21 1408) 98.0 Skin Warm and dry Warm and dry   Weight Pre-Dialysis Weight: 96.2 kg (212 lb 1.3 oz) (12/08/21 0525)  Edema none none   Tele status   Pain Pain Intensity 1: 6 (12/10/21 1059) 1:6     Orders   Duration: Start: 2084 End: 1745 Total: 3.5 hrs   Dialyzer: Dialyzer/Set Up Inspection: Revaclear (12/10/21 1408)   K Bath: Dialysate K (mEq/L): 3 (12/10/21 1408)   Ca Bath: Dialysate CA (mEq/L): 2.5 (12/10/21 1408)   Na: Dialysate NA (mEq/L): 140 (12/10/21 1408)   Bicarb: Dialysate HCO3 (mEq/L): 35 (12/10/21 1408)   Target Fluid Removal: Goal/Amount of Fluid to Remove (mL): 2000 mL (12/10/21 1408)     Access   Type & Location: [facility]   Comments: Each catheter limb disinfected per p&p, caps removed, hubs disinfected per p&p. Each dialysis catheter limb disinfected per p&p, blood returned per p&p, each dialysis hub disinfected per p&p, post dialysis catheter dwell instilled per order, and caps applied.   Positional, variable flow                                        Labs   HBsAg (Antigen) / date: Neg 12-3-21                                              HBsAb (Antibody) / date: Susceptible 12-3-21   Source: Epic   Obtained/Reviewed  Critical Results Called HGB   Date Value Ref Range Status   12/08/2021 9.7 (L) 11.5 - 16.0 g/dL Final     Potassium   Date Value Ref Range Status   12/08/2021 4.0 3.5 - 5.1 mmol/L Final     Calcium   Date Value Ref Range Status   12/08/2021 8.1 (L) 8.5 - 10.1 MG/DL Final     BUN   Date Value Ref Range Status   12/08/2021 25 (H) 6 - 20 MG/DL Final     Creatinine   Date Value Ref Range Status   12/08/2021 4.44 (H) 0.55 - 1.02 MG/DL Final     Comment:     INVESTIGATED PER DELTA CHECK PROTOCOL Meds Given   Name Dose Route   Heparin 1000 2500 units intracath/1745               Adequacy / Fluid    Total Liters Process: 55.1   Net Fluid Removed: 9919      Comments   Time Out Done:   (Time) 1405   Admitting Diagnosis:    Consent obtained/signed: Informed Consent Verified: Yes (12/10/21 1408)   Machine / RO # Machine Number: [id number] (12/10/21 5546)   Primary Nurse Rpt Pre: John Guy RN   Primary Nurse Rpt Post: John Guy RN   Pt Education: procedural   Care Plan: Routine HD   Pts outpatient clinic:      Tx Summary   Comments:   Patients catheter was positional and BFR was gradually reduced to keep it flowing. Also system clotted and changed with  approx 45 minutes left. No other problems. Patient tolerated well. Report to neil Rivera RN.

## 2021-12-10 NOTE — PROGRESS NOTES
Bedside and Verbal shift change report given to Caldwell Buerger, RN (oncoming nurse) by Brien Salas RN (offgoing nurse). Report included the following information SBAR, Kardex, Intake/Output, MAR, Accordion and Recent Results.

## 2021-12-10 NOTE — NURSE NAVIGATOR
Follow up scheduled with Dr. Teresa Powers on 12/16/21 at 11:00 am.  Information on After Visit Summary.

## 2021-12-10 NOTE — PROGRESS NOTES
[facility]   [address] [facility], [address] [place] [address]  Phone: [phone]   [phone]    [email or web address]     Nephrology Progress Note    Patient Name : William Charles      : 1955     MRN : [id number]  Date of Admission : 2021  Date of Servive : 12/10/21    CC: Follow up for ARF      Assessment and Plan   BRENNAN on CKD:   - progressive CKD  - started HD on 12/3  - HD MWF for now  - to go to [facility] upon d/c    Nephrotic Range Proteinuria   - 12 gms, no M-spike on SPEP    CKD 4  - progressive CKD - presumed DKD  - baseline Cr 3.9 mg/dl in   - f/b Dr Merly Mcfarlane at [facility]     Anemia in CKD   - started MADY    Back pain   - ?  R psoas muscle Lipoma     HTN   DM-II, HLD  Obesity      Interval History:  Seen and examined. Feeling well. No cp, sob, n/v/d. For HD later today then possible d/c    Review of Systems: A comprehensive review of systems was negative except for that written in the HPI.     Current Medications:   Current Facility-Administered Medications   Medication Dose Route Frequency    epoetin joni-epbx (RETACRIT) injection 20,000 Units  20,000 Units SubCUTAneous Q MON, WED & FRI    hydrALAZINE (APRESOLINE) tablet 50 mg  50 mg Oral TID    rosuvastatin (CRESTOR) tablet 10 mg  10 mg Oral QHS    0.9% sodium chloride infusion 250 mL  250 mL IntraVENous PRN    0.9% sodium chloride infusion 250 mL  250 mL IntraVENous PRN    oxyCODONE-acetaminophen (PERCOCET) 5-325 mg per tablet 1 Tablet  1 Tablet Oral Q4H PRN    morphine injection 2 mg  2 mg IntraVENous Q4H PRN    heparin (porcine) 1,000 unit/mL injection 1,100 Units  1,100 Units Hemodialysis DIALYSIS PRN    heparin (porcine) 1,000 unit/mL injection 1,400 Units  1,400 Units Hemodialysis DIALYSIS PRN    0.9% sodium chloride infusion 250 mL  250 mL IntraVENous PRN    sodium chloride (NS) flush 5-40 mL  5-40 mL IntraVENous Q8H    sodium chloride (NS) flush 5-40 mL  5-40 mL IntraVENous PRN  acetaminophen (TYLENOL) tablet 650 mg  650 mg Oral Q6H PRN    Or    acetaminophen (TYLENOL) suppository 650 mg  650 mg Rectal Q6H PRN    polyethylene glycol (MIRALAX) packet 17 g  17 g Oral DAILY PRN    ondansetron (ZOFRAN ODT) tablet 4 mg  4 mg Oral Q8H PRN    Or    ondansetron (ZOFRAN) injection 4 mg  4 mg IntraVENous Q6H PRN    albuterol-ipratropium (DUO-NEB) 2.5 MG-0.5 MG/3 ML  3 mL Nebulization Q4H PRN    aspirin tablet 325 mg  325 mg Oral DAILY    metoprolol succinate (TOPROL-XL) XL tablet 100 mg  100 mg Oral DAILY    NIFEdipine ER (PROCARDIA XL) tablet 60 mg  60 mg Oral DAILY    montelukast (SINGULAIR) tablet 10 mg  10 mg Oral QHS    arformoterol 15 mcg/budesonide 0.25 mg neb solution   Nebulization BID RT    insulin lispro (HUMALOG) injection   SubCUTAneous AC&HS    glucose chewable tablet 16 g  4 Tablet Oral PRN    dextrose (D50W) injection syrg 12.5-25 g  12.5-25 g IntraVENous PRN    glucagon (GLUCAGEN) injection 1 mg  1 mg IntraMUSCular PRN      Allergies   Allergen Reactions    Ivp Dye [Fd And C Blue No.1] Hives       Objective:  Vitals:    Vitals:    12/09/21 2040 12/09/21 2303 12/10/21 0243 12/10/21 1054   BP: (!) 168/87  127/68 (!) 148/74   Pulse: 91  95 98   Resp: 18  16 16   Temp: 98.1 °F (36.7 °C)  98 °F (36.7 °C) 98.4 °F (36.9 °C)   TempSrc:       SpO2: 99% 99% 96% 98%   Weight:       Height:         Intake and Output:  No intake/output data recorded.   12/08 1901 - 12/10 0700  In: 240 [P.O.:240]  Out: 250 [Urine:250]    Physical Examination:        General: Obese   Neck:  RIJ PC in place  Resp:  Lungs clear b/l  CV:  RRR,  no murmur or rub, 1+ LE edema  GI:  Soft, NT, + BS, no HS megaly  Neurologic:  Non focal  Psych:             AAO x 3 appropriate affect   Skin:  No Rash      []    High complexity decision making was performed  []    Patient is at high-risk of decompensation with multiple organ involvement    Lab Data Personally Reviewed: I have reviewed all the pertinent labs, microbiology data and radiology studies during assessment. Recent Labs     12/08/21  0112      K 4.0      CO2 21   *   BUN 25*   CREA 4.44*   CA 8.1*     Recent Labs     12/08/21  0112   WBC 8.9   HGB 9.7*   HCT 31.3*        No results found for: SDES  Lab Results   Component Value Date/Time    Culture result: NO GROWTH 5 DAYS 03/15/2018 06:09 PM     Recent Results (from the past 24 hour(s))   GLUCOSE, POC    Collection Time: 12/09/21 12:09 PM   Result Value Ref Range    Glucose (POC) 246 (H) 65 - 117 mg/dL    Performed by Sandie Farmer    GLUCOSE, POC    Collection Time: 12/09/21  4:29 PM   Result Value Ref Range    Glucose (POC) 208 (H) 65 - 117 mg/dL    Performed by Nona Mari    GLUCOSE, POC    Collection Time: 12/09/21 11:06 PM   Result Value Ref Range    Glucose (POC) 202 (H) 65 - 117 mg/dL    Performed by China Miranda, POC    Collection Time: 12/10/21  6:24 AM   Result Value Ref Range    Glucose (POC) 175 (H) 65 - 117 mg/dL    Performed by Marco A Avila            I have reviewed the flowsheets. Chart and Pertinent Notes have been reviewed. No change in PMH ,family and social history from Consult note.       Terence Francis MD  [facility]

## 2021-12-10 NOTE — PROGRESS NOTES
[facility] Adult  Hospitalist Group                                                                                          Hospitalist Progress Note  Gabriel Santos MD  Answering service: [phone] from in house phone      NAME:  Idalia Koo  :  1955  MRN:  [id number]      Admission Summary:   78 y.o lady w/ CAD, CHF, COPD, DM, HTN, asthma, CKD IV, who presents with shortness of breath. Symptoms began at least a week ago. She describes progressive dyspnea worsened with exertion. She denies associated chest pain, fever, n/v/d. She does have a mildly productive cough. Interval history / Subjective:   Resting in bed. No worsening of SOB, however not completely resolved. Asked if she can get Rolator walker with seat due to activity intolerance. D/w CM. Awaiting outpt HD chair arrangement. HD today. Assessment & Plan:     Shortness of breath: this is likely due to pulmonary edema / volume overload in the setting of CKD and CHF. Symptomatic anemia may be contributing.  -Stable SO2 on RA  -Still has some TIPTON but overall getting better.  -Feels she would be better and safer if she could get walker with seat, due to to activity tolerance limitation with shortness of breath.      BRENNAN on CKD IV: initiated on HD, s/p Permacath placed on 21  -Nephrology consulted and orders/recs noted  -Permacath  placed 21 and started hemodialysis 12/3/21  -Awaiting outpatient HD chair arrangement, hopefully today     Anemia: no evidence of bleeding. Stool is negative for occult blood.   -check iron studies, b12/folate, retics, haptoglobin  -Pt transfused 2 units PRBCs, Hb stable     Elevated serum troponin: with EKG changes- consult cardiology for eval and tx recs  -Stress test done , EF 61%, negative for ischemia  -Echo done , noted. -Cardiology consulted. Appreciate. No further work up warranted.  Signed off   -Outpatient cardiology follow up as necessary     Asymmetric SANTHOSH edema: pt states this is chronic  -RLE doppler neg for DVT     Type 2 DM:  -SSI/POC checks     HTN:  -Continue metoprolol, nifedipine,  hydralazine     COPD/Asthma: no evidence of bronchospasm  -Resume home meds  -History of tobacco use, quit 1 month ago    Resp therapy noted evidence of sleep apnea - patient will need formal sleep study after DC and likely needs a CPAP machine    Code status: Full Code  DVT prophylaxis: SCDs    Care Plan discussed with: Patient/Family  Anticipated Disposition: Home w/Family  Anticipated Discharge: 24hrs, awaiting outpatient HD chair arrangement  Update: DC home with HD at [facility] on [address] [facility] [phone], starting on Monday Wednesday Friday, at [address] Problems  Date Reviewed: 4/25/2019          Codes Class Noted POA    Acute renal failure (ARF) ([facility] [address] ICD-10-CM: N17.9  ICD-9-CM: 584.9  12/2/2021 Unknown                Review of Systems:   A comprehensive review of systems was negative except for that written in the HPI. Vital Signs:    Last 24hrs VS reviewed since prior progress note. Most recent are:  Visit Vitals  /68   Pulse 95   Temp 98 °F (36.7 °C)   Resp 16   Ht 5' 4\" (1.626 m)   Wt 96.2 kg (212 lb)   SpO2 96%   BMI 36.39 kg/m²     Patient Vitals for the past 24 hrs:   Temp Pulse Resp BP SpO2   12/10/21 0243 98 °F (36.7 °C) 95 16 127/68 96 %   12/09/21 2303 -- -- -- -- 99 %   12/09/21 2040 98.1 °F (36.7 °C) 91 18 (!) 168/87 99 %   12/09/21 1859 -- 90 -- -- --   12/09/21 1520 98 °F (36.7 °C) 91 18 (!) 156/81 99 %   12/09/21 1434 -- 90 -- -- --   12/09/21 1245 -- (!) 103 -- -- --   12/09/21 1105 98.5 °F (36.9 °C) 93 -- 120/82 97 %   12/09/21 1029 -- 96 -- -- --     No intake or output data in the 24 hours ending 12/10/21 1026     Physical Examination:          Constitutional:  No acute distress, cooperative, pleasant    ENT:  Oral mucosa dry.     Resp:  Unchanged reduced AE b/l, No wheezing/rhonchi   CV:  Regular rhythm, normal rate, 3/6 SE murmurs, GI:  Soft, non distended, non tender. Musculoskeletal:  No edema, warm, 2+ pulses throughout    Neurologic:  Moves all extremities. AAOx3, CN II-XII reviewed            Data Review:   Review and/or order of clinical lab test  Review and/or order of tests in the radiology section of [place]  Review and/or order of tests in the medicine section of [place]    Radiology:  IR INSERT TUNL CVC W/O PORT OVER 5 YR    Result Date: 12/7/2021  Successful tunneled right sided dialysis catheter placement as described above. The catheter is functioning and is ready for use. NM Cardiac stress test 12/6/21:  Interpretation Summary  · Baseline ECG: Sinus rhythm, non-specific ST-T wave abnormalities. · Stress test: Stress EKG normal. Exercise stress test: EKG stress test results correlate with a low risk of inducible myocardial ischemia. Labs:     Recent Labs     12/08/21  0112   WBC 8.9   HGB 9.7*   HCT 31.3*        Recent Labs     12/08/21 0112      K 4.0      CO2 21   BUN 25*   CREA 4.44*   *   CA 8.1*     No results for input(s): ALT, AP, TBIL, TBILI, TP, ALB, GLOB, GGT, AML, LPSE in the last 72 hours. No lab exists for component: SGOT, GPT, AMYP, HLPSE  No results for input(s): INR, PTP, APTT, INREXT, INREXT in the last 72 hours. No results for input(s): FE, TIBC, PSAT, FERR in the last 72 hours. Lab Results   Component Value Date/Time    Folate 18.1 12/02/2021 08:16 PM      No results for input(s): PH, PCO2, PO2 in the last 72 hours. No results for input(s): CPK, CKNDX, TROIQ in the last 72 hours.     No lab exists for component: CPKMB  Lab Results   Component Value Date/Time    Cholesterol, total 260 (H) 07/02/2018 04:27 AM    HDL Cholesterol 31 07/02/2018 04:27 AM    LDL, calculated 197.2 (H) 07/02/2018 04:27 AM    Triglyceride 159 (H) 07/02/2018 04:27 AM    CHOL/HDL Ratio 8.4 (H) 07/02/2018 04:27 AM     Lab Results   Component Value Date/Time    Glucose (POC) 175 (H) 12/10/2021 06:24 AM    Glucose (POC) 202 (H) 12/09/2021 11:06 PM    Glucose (POC) 208 (H) 12/09/2021 04:29 PM    Glucose (POC) 246 (H) 12/09/2021 12:09 PM    Glucose (POC) 196 (H) 12/09/2021 06:27 AM     Lab Results   Component Value Date/Time    Color YELLOW/STRAW 12/02/2021 11:00 PM    Appearance CLEAR 12/02/2021 11:00 PM    Specific gravity 1.025 12/02/2021 11:00 PM    Specific gravity 1.010 06/08/2011 12:00 AM    pH (UA) 5.5 12/02/2021 11:00 PM    Protein 300 (A) 12/02/2021 11:00 PM    Glucose 500 (A) 12/02/2021 11:00 PM    Ketone Negative 12/02/2021 11:00 PM    Bilirubin Negative 12/02/2021 11:00 PM    Urobilinogen 1.0 12/02/2021 11:00 PM    Nitrites Negative 12/02/2021 11:00 PM    Leukocyte Esterase Negative 12/02/2021 11:00 PM    Epithelial cells FEW 12/02/2021 11:00 PM    Bacteria Negative 12/02/2021 11:00 PM    WBC 0-4 12/02/2021 11:00 PM    RBC 0-5 12/02/2021 11:00 PM         Medications Reviewed:     Current Facility-Administered Medications   Medication Dose Route Frequency    epoetin joni-epbx (RETACRIT) injection 20,000 Units  20,000 Units SubCUTAneous Q MON, WED & FRI    hydrALAZINE (APRESOLINE) tablet 50 mg  50 mg Oral TID    rosuvastatin (CRESTOR) tablet 10 mg  10 mg Oral QHS    0.9% sodium chloride infusion 250 mL  250 mL IntraVENous PRN    0.9% sodium chloride infusion 250 mL  250 mL IntraVENous PRN    oxyCODONE-acetaminophen (PERCOCET) 5-325 mg per tablet 1 Tablet  1 Tablet Oral Q4H PRN    morphine injection 2 mg  2 mg IntraVENous Q4H PRN    heparin (porcine) 1,000 unit/mL injection 1,100 Units  1,100 Units Hemodialysis DIALYSIS PRN    heparin (porcine) 1,000 unit/mL injection 1,400 Units  1,400 Units Hemodialysis DIALYSIS PRN    0.9% sodium chloride infusion 250 mL  250 mL IntraVENous PRN    sodium chloride (NS) flush 5-40 mL  5-40 mL IntraVENous Q8H    sodium chloride (NS) flush 5-40 mL  5-40 mL IntraVENous PRN    acetaminophen (TYLENOL) tablet 650 mg  650 mg Oral Q6H PRN    Or    acetaminophen (TYLENOL) suppository 650 mg  650 mg Rectal Q6H PRN    polyethylene glycol (MIRALAX) packet 17 g  17 g Oral DAILY PRN    ondansetron (ZOFRAN ODT) tablet 4 mg  4 mg Oral Q8H PRN    Or    ondansetron (ZOFRAN) injection 4 mg  4 mg IntraVENous Q6H PRN    albuterol-ipratropium (DUO-NEB) 2.5 MG-0.5 MG/3 ML  3 mL Nebulization Q4H PRN    aspirin tablet 325 mg  325 mg Oral DAILY    metoprolol succinate (TOPROL-XL) XL tablet 100 mg  100 mg Oral DAILY    NIFEdipine ER (PROCARDIA XL) tablet 60 mg  60 mg Oral DAILY    montelukast (SINGULAIR) tablet 10 mg  10 mg Oral QHS    arformoterol 15 mcg/budesonide 0.25 mg neb solution   Nebulization BID RT    insulin lispro (HUMALOG) injection   SubCUTAneous AC&HS    glucose chewable tablet 16 g  4 Tablet Oral PRN    dextrose (D50W) injection syrg 12.5-25 g  12.5-25 g IntraVENous PRN    glucagon (GLUCAGEN) injection 1 mg  1 mg IntraMUSCular PRN     ______________________________________________________________________  EXPECTED LENGTH OF STAY: 3d 0h  ACTUAL LENGTH OF STAY:          8                 Osmin Barboza MD

## 2021-12-10 NOTE — PROGRESS NOTES
PRIMITIVO: Plan for discharge home when medically stable. Outpatient HD chair is secured at [facility] for MWF at 4 PM. Patient to arrive at 3 PM on Monday. Order for rollator sent to [address] to be delivered to the patient's home. Daughter Sarah Horner #[phone] will transport patient home via car. Chart reviewed. Noted patient transfer to [place] [id number]. CM called [facility] [phone] ext [phone] and spoke with Radha Dunn. Confirmed they received the hep B core results. The chair time is still not confirmed at [facility]. Radha Dunn stated that they will call this CM by the end of the day today to confirm chair time. They are aware of possible discharge home today. [address] has approved patient to deliver rollator to the home. CM confirmed patient's home address to for delivery. CM received call from [facility] [address]. Will update with discharge status once outpatient HD chair time is secured. 2:09 PM: CM called [facility] admissions again and spoke with Lisseth. She is reaching out to the center directly and will follow-up with CM shortly regarding status of chair time. CM spoke with Nicolas at [facility] Phone: [phone]. Confirmed chair time is actually MWF at 4 PM. CM updated AVS.     CM spoke with daughter and confirmed plans to discharge today. Daughter will be here to transport patient home shortly.     CM faxed discharge papers to [facility] at [phone]    KLAUS Whitfield/KHANH

## 2021-12-10 NOTE — DISCHARGE INSTRUCTIONS
Discharge Instructions       PATIENT ID: Simeon Joy  MRN: [id number]   YOB: 1955    DATE OF ADMISSION: 12/2/2021  9:35 PM    DATE OF DISCHARGE: 12/10/2021    PRIMARY CARE PROVIDER: Jennifer Mcdowell MD     ATTENDING PHYSICIAN: Andrez Gil MD  DISCHARGING PROVIDER: Latrice Avery MD    To contact this individual call [phone] and ask the  to page. If unavailable ask to be transferred the Adult Hospitalist Department. DISCHARGE DIAGNOSES   Shortness of breath: this is likely due to pulmonary edema / volume overload in the setting of CKD and CHF. Symptomatic anemia may be contributing.  -Stable SO2 on RA  -Still has some TIPTON but overall getting better.  -Feels she would be better and safer if she could get walker with seat, due to to activity tolerance limitation with shortness of breath.      BRENNAN on CKD IV: initiated on HD, s/p Permacath placed on 12/7/21  -Nephrology consulted and orders/recs noted  -Permacath  placed 12/7/21 and started hemodialysis 12/3/21  -Awaiting outpatient HD chair arrangement, hopefully today     Anemia: no evidence of bleeding. Stool is negative for occult blood.   -check iron studies, b12/folate, retics, haptoglobin  -Pt transfused 2 units PRBCs, Hb stable     Elevated serum troponin: with EKG changes- consult cardiology for eval and tx recs  -Stress test done 12/6, EF 61%, negative for ischemia  -Echo done 12/9, noted. -Cardiology consulted. Appreciate. No further work up warranted.  Signed off 12/8  -Outpatient cardiology follow up as necessary     Asymmetric LE edema: pt states this is chronic  -RLE doppler neg for DVT     Type 2 DM:  -SSI/POC checks     HTN:  -Continue metoprolol, nifedipine,  hydralazine     COPD/Asthma: no evidence of bronchospasm  -Resume home meds  -History of tobacco use, quit 1 month ago     Resp therapy noted evidence of sleep apnea - patient will need formal sleep study after DC and likely needs a CPAP machine     Code status: Full Code  DVT prophylaxis: SCDs     Care Plan discussed with: Patient/Family  Anticipated Disposition: Home w/Family  Anticipated Discharge: 24hrs, awaiting outpatient HD chair arrangement  Update: DC home with HD at [facility] on [address] [facility] [phone], starting on Monday Wednesday Friday, at 4 PM    CONSULTATIONS: Federico 6: * No surgery found *    PENDING TEST RESULTS:   At the time of discharge the following test results are still pending: None    FOLLOW UP APPOINTMENTS:   Follow-up Information     Follow up With Specialties Details Why Contact Info    [address] [facility] DME Services  [facility] will deliver the rollator to your home [address] [facility]    Melissa Palomo MD Family Medicine   [facility]  [address] [facility]      [facility], [address] [place] [address] Vascular Surgery, Interventional Cardiology, Cardiology Go on 12/16/2021 at 11 am on 12/16/21 [address]      Joycie Orn on 12/13/2021 for dialysis on Mondays, Wednesdays, and Fridays at 4 PM. Please arrive at 3 PM on Monday, 12/13. Address: Tex Lynch [address] [place] [address], [address] [place] [address]  Phone: [phone]      Melissa Palomo MD [facility] Medicine Schedule an appointment as soon as possible for a visit in 1 week PCP: Please call office to schedule post hospital discharge follow-up within 1 week [facility] & [address]  [address] [place] [address]      Carina Cheung MD Nephrology  Nephrology: Please call your kidney doctors office for any concerns or questions related to your dialysis or kidneys [phone] [facility]  [phone]             ADDITIONAL CARE RECOMMENDATIONS:   Follow up as noted in the appointments. PCP f/up with CBC, CMP/BMP check in 1 week.   Please call office of other specialists involved in your care for scheduling/ verifying appointments and for any Q/concerns. · It is important that you take the medication exactly as they are prescribed. · Keep your medication in the bottles provided by the pharmacist and keep a list of the medication names, dosages, and times to be taken in your wallet. · Do not take other medications without consulting your doctor. · No drinking alcohol or driving car or operating machinery if you are on narcotic pain medications. Donot take sedating mediations if you are sleepy or confused. · Fall Precautions  · Keep Well Hydrated  · Report to your medical provider if you feel you have  developed allergies to medications  · Follow up with your PCP or Consultant for medication adjustments and refills  · Monitor for signs of fevers,chills,bleeding,chest pain and seek medical attention if you do so. DIET: Cardiac Diet and Diabetic Diet    ACTIVITY: Activity as tolerated    WOUND CARE: N/A    EQUIPMENT needed: N/A      Radiology:  IR INSERT TUNL CVC W/O PORT OVER 5 YR    Result Date: 12/7/2021  Successful tunneled right sided dialysis catheter placement as described above. The catheter is functioning and is ready for use. Labs:  Recent Results (from the past 24 hour(s))   GLUCOSE, POC    Collection Time: 12/09/21 11:06 PM   Result Value Ref Range    Glucose (POC) 202 (H) 65 - 117 mg/dL    Performed by Sarath Snell, POC    Collection Time: 12/10/21  6:24 AM   Result Value Ref Range    Glucose (POC) 175 (H) 65 - 117 mg/dL    Performed by Sarath Snell, POC    Collection Time: 12/10/21 11:59 AM   Result Value Ref Range    Glucose (POC) 257 (H) 65 - 117 mg/dL    Performed by Joel Wilson (PCT)            DISCHARGE MEDICATIONS:   See Medication Reconciliation Form    · It is important that you take the medication exactly as they are prescribed.    · Keep your medication in the bottles provided by the pharmacist and keep a list of the medication names, dosages, and times to be taken in your wallet. · Do not take other medications without consulting your doctor. NOTIFY YOUR PHYSICIAN FOR ANY OF THE FOLLOWING:   Fever over 101 degrees for 24 hours. Chest pain, shortness of breath, fever, chills, nausea, vomiting, diarrhea, change in mentation, falling, weakness, bleeding. Severe pain or pain not relieved by medications. Or, any other signs or symptoms that you may have questions about.       DISPOSITION:  x  Home With:   OT  PT  HH  RN       SNF/Inpatient Rehab/LTAC    Independent/assisted living    Hospice    Other:     CDMP Checked:   Yes x     PROBLEM LIST Updated:  Yes x       Signed:   Nini Lemon MD  12/10/2021  5:02 PM

## 2021-12-13 ENCOUNTER — TELEPHONE (OUTPATIENT)
Dept: CASE MANAGEMENT | Age: 66
End: 2021-12-13

## 2021-12-14 ENCOUNTER — TELEPHONE (OUTPATIENT)
Dept: CASE MANAGEMENT | Age: 66
End: 2021-12-14

## 2021-12-14 NOTE — TELEPHONE ENCOUNTER
HF NN attempted to place post hospital discharge follow up phone call. Voice mail box is full, unable to leave a message.

## 2022-01-07 NOTE — PROGRESS NOTES
Called for latest labs to be faxed from [address] [place] [address] dialysis. Fax number provided.

## 2022-01-12 NOTE — PERIOP NOTES
[facility]  Preoperative Instructions    Surgery Date 1/18/2022          Time of Arrival TBD  Contact # [id number] daughter-Mona Romero    1. On the day of your surgery, please report to the Surgical Services Registration Desk and sign in at your designated time. The Surgery Center is located to the right of the Emergency Room. 2. You must have someone with you to drive you home. You should not drive a car for 24 hours following surgery. Please make arrangements for a friend or family member to stay with you for the first 24 hours after your surgery. 3. Do not have anything to eat or drink (including water, gum, mints, coffee, juice) after midnight 1/17/2022. ? This may not apply to medications prescribed by your physician. ?(Please note below the special instructions with medications to take the morning of your procedure.)    4. We recommend you do not drink any alcoholic beverages for 24 hours before and after your surgery. 5. Contact your surgeons office for instructions on the following medications: non-steroidal anti-inflammatory drugs (i.e. Advil, Aleve), vitamins, and supplements. (Some surgeons will want you to stop these medications prior to surgery and others may allow you to take them)  **If you are currently taking Plavix, Coumadin, Aspirin and/or other blood-thinning agents, contact your surgeon for instructions. ** Your surgeon will partner with the physician prescribing these medications to determine if it is safe to stop or if you need to continue taking. Please do not stop taking these medications without instructions from your surgeon    6. Wear comfortable clothes. Wear glasses instead of contacts. Do not bring any money or jewelry. Please bring picture ID, insurance card, and any prearranged co-payment or hospital payment. Do not wear make-up, particularly mascara the morning of your surgery.   Do not wear nail polish, particularly if you are having foot /hand surgery. Wear your hair loose or down, no ponytails, buns, yenny pins or clips. All body piercings must be removed. Please shower with antibacterial soap for three consecutive days before and on the morning of surgery, but do not apply any lotions, powders or deodorants after the shower on the day of surgery. Please use a fresh towels after each shower. Please sleep in clean clothes and change bed linens the night before surgery. Please do not shave for 48 hours prior to surgery. Shaving of the face is acceptable. 7. You should understand that if you do not follow these instructions your surgery may be cancelled. If your physical condition changes (I.e. fever, cold or flu) please contact your surgeon as soon as possible. 8. It is important that you be on time. If a situation occurs where you may be late, please call [phone] (OR Holding Area). 9. If you have any questions and or problems, please call [phone] (Pre-admission Testing). 10. Your surgery time may be subject to change. You will receive a phone call the evening prior if your time changes. 11.  If having outpatient surgery, you must have someone to drive you here, stay with you during the duration of your stay, and to drive you home at time of discharge. Special Instructions:   Patient is scheduled for a Covid-19 test @ [facility] between 7am - 12/noon on Friday, 1/14/2022; patient was instructed to self quarantine after test through day of surgery/procedure. TAKE ALL MEDICATIONS DAY OF SURGERY EXCEPT: Aspirin, Novolin insulin   Please check your blood sugar morning of surgery, if abnormal reading or you feel symptomatic of high/low blood sugar call to the [facility] [address] [facility] for further instructions. I understand a pre-operative phone call will be made to verify my surgery time.   In the event that I am not available, I give permission for a message to be left on my answering service and/or with another person?   yes         ___________________      __________   1/12/2022 @ 0196    (Signature of Patient)             (Witness)                (Date and Time)

## 2022-01-13 NOTE — PERIOP NOTES
Called to [facility] SHAZIA [phone], sp/w Tushar Cuevas, who states put order in to draw CBC/CMP during HD on Friday 1/14, results should be available Monday 1/17.

## 2022-01-14 ENCOUNTER — HOSPITAL ENCOUNTER (OUTPATIENT)
Dept: PREADMISSION TESTING | Age: 67
Discharge: HOME | End: 2022-01-14
Payer: MEDICARE

## 2022-01-14 LAB
SARS-COV-2, XPLCVT: NOT DETECTED
SOURCE, COVRS: NORMAL

## 2022-01-14 PROCEDURE — U0005 INFEC AGEN DETEC AMPLI PROBE: HCPCS

## 2022-01-17 ENCOUNTER — ANESTHESIA EVENT (OUTPATIENT)
Dept: SURGERY | Age: 67
End: 2022-01-17
Payer: MEDICARE

## 2022-01-18 ENCOUNTER — ANESTHESIA (OUTPATIENT)
Dept: SURGERY | Age: 67
End: 2022-01-18
Payer: MEDICARE

## 2022-01-18 ENCOUNTER — HOSPITAL ENCOUNTER (OUTPATIENT)
Age: 67
Setting detail: OUTPATIENT SURGERY
Discharge: HOME | End: 2022-01-18
Attending: SURGERY | Admitting: SURGERY
Payer: MEDICARE

## 2022-01-18 VITALS
WEIGHT: 199.08 LBS | OXYGEN SATURATION: 100 % | BODY MASS INDEX: 33.99 KG/M2 | TEMPERATURE: 98 F | HEIGHT: 64 IN | HEART RATE: 100 BPM | DIASTOLIC BLOOD PRESSURE: 71 MMHG | SYSTOLIC BLOOD PRESSURE: 138 MMHG | RESPIRATION RATE: 15 BRPM

## 2022-01-18 DIAGNOSIS — N18.6 ESRD (END STAGE RENAL DISEASE) (HCC): Primary | ICD-10-CM

## 2022-01-18 LAB
BASE EXCESS BLD CALC-SCNC: 2.3 MMOL/L
CA-I BLD-MCNC: 1.04 MMOL/L (ref 1.12–1.32)
CHLORIDE BLD-SCNC: 103 MMOL/L (ref 100–108)
CO2 BLD-SCNC: 28 MMOL/L (ref 19–24)
CREAT UR-MCNC: 3.5 MG/DL (ref 0.6–1.3)
GLUCOSE BLD STRIP.AUTO-MCNC: 168 MG/DL (ref 65–117)
GLUCOSE BLD STRIP.AUTO-MCNC: 182 MG/DL (ref 74–106)
HCO3 BLDA-SCNC: 27 MMOL/L
LACTATE BLD-SCNC: 0.75 MMOL/L (ref 0.4–2)
PCO2 BLDV: 43.4 MMHG (ref 41–51)
PH BLDV: 7.41 [PH] (ref 7.32–7.42)
PO2 BLDV: 39 MMHG (ref 25–40)
POTASSIUM BLD-SCNC: 3.4 MMOL/L (ref 3.5–5.5)
SERVICE CMNT-IMP: ABNORMAL
SODIUM BLD-SCNC: 142 MMOL/L (ref 136–145)
SPECIMEN SITE: ABNORMAL

## 2022-01-18 PROCEDURE — 74011250636 HC RX REV CODE- 250/636: Performed by: SURGERY

## 2022-01-18 PROCEDURE — 77030004495 HC ADPT PERI DLYS BAXT -C: Performed by: SURGERY

## 2022-01-18 PROCEDURE — 74011250636 HC RX REV CODE- 250/636: Performed by: NURSE ANESTHETIST, CERTIFIED REGISTERED

## 2022-01-18 PROCEDURE — 77030008684 HC TU ET CUF COVD -B: Performed by: ANESTHESIOLOGY

## 2022-01-18 PROCEDURE — 76210000017 HC OR PH I REC 1.5 TO 2 HR: Performed by: SURGERY

## 2022-01-18 PROCEDURE — 77030010507 HC ADH SKN DERMBND J&J -B: Performed by: SURGERY

## 2022-01-18 PROCEDURE — 77030008771 HC TU NG SALEM SUMP -A: Performed by: ANESTHESIOLOGY

## 2022-01-18 PROCEDURE — C1750 CATH, HEMODIALYSIS,LONG-TERM: HCPCS | Performed by: SURGERY

## 2022-01-18 PROCEDURE — 77030031139 HC SUT VCRL2 J&J -A: Performed by: SURGERY

## 2022-01-18 PROCEDURE — 77030020829: Performed by: SURGERY

## 2022-01-18 PROCEDURE — 77030008606 HC TRCR ENDOSC KII AMR -B: Performed by: SURGERY

## 2022-01-18 PROCEDURE — 2709999900 HC NON-CHARGEABLE SUPPLY: Performed by: SURGERY

## 2022-01-18 PROCEDURE — 74011250637 HC RX REV CODE- 250/637: Performed by: SURGERY

## 2022-01-18 PROCEDURE — 82947 ASSAY GLUCOSE BLOOD QUANT: CPT

## 2022-01-18 PROCEDURE — 76010000153 HC OR TIME 1.5 TO 2 HR: Performed by: SURGERY

## 2022-01-18 PROCEDURE — 76210000020 HC REC RM PH II FIRST 0.5 HR: Performed by: SURGERY

## 2022-01-18 PROCEDURE — 77030002986 HC SUT PROL J&J -A: Performed by: SURGERY

## 2022-01-18 PROCEDURE — 77030002933 HC SUT MCRYL J&J -A: Performed by: SURGERY

## 2022-01-18 PROCEDURE — 77030034154 HC SHR COAG HARM ACE J&J -F: Performed by: SURGERY

## 2022-01-18 PROCEDURE — 77030026438 HC STYL ET INTUB CARD -A: Performed by: ANESTHESIOLOGY

## 2022-01-18 PROCEDURE — 82962 GLUCOSE BLOOD TEST: CPT

## 2022-01-18 PROCEDURE — 76060000034 HC ANESTHESIA 1.5 TO 2 HR: Performed by: SURGERY

## 2022-01-18 PROCEDURE — 77030012770 HC TRCR OPT FX AMR -B: Performed by: SURGERY

## 2022-01-18 PROCEDURE — 74011000250 HC RX REV CODE- 250: Performed by: NURSE ANESTHETIST, CERTIFIED REGISTERED

## 2022-01-18 PROCEDURE — 74011000250 HC RX REV CODE- 250: Performed by: SURGERY

## 2022-01-18 PROCEDURE — 74011250636 HC RX REV CODE- 250/636: Performed by: ANESTHESIOLOGY

## 2022-01-18 PROCEDURE — 77030013079 HC BLNKT BAIR HGGR 3M -A: Performed by: ANESTHESIOLOGY

## 2022-01-18 RX ORDER — HEPARIN SODIUM 1000 [USP'U]/ML
1800 INJECTION, SOLUTION INTRAVENOUS; SUBCUTANEOUS ONCE
Status: COMPLETED | OUTPATIENT
Start: 2022-01-18 | End: 2022-01-18

## 2022-01-18 RX ORDER — DULAGLUTIDE 0.75 MG/.5ML
0.75 INJECTION, SOLUTION SUBCUTANEOUS
COMMUNITY

## 2022-01-18 RX ORDER — VANCOMYCIN/0.9 % SOD CHLORIDE 1.5G/250ML
1500 PLASTIC BAG, INJECTION (ML) INTRAVENOUS ONCE
Status: COMPLETED | OUTPATIENT
Start: 2022-01-18 | End: 2022-01-18

## 2022-01-18 RX ORDER — PROPOFOL 10 MG/ML
INJECTION, EMULSION INTRAVENOUS AS NEEDED
Status: DISCONTINUED | OUTPATIENT
Start: 2022-01-18 | End: 2022-01-18 | Stop reason: HOSPADM

## 2022-01-18 RX ORDER — SODIUM CHLORIDE, SODIUM LACTATE, POTASSIUM CHLORIDE, CALCIUM CHLORIDE 600; 310; 30; 20 MG/100ML; MG/100ML; MG/100ML; MG/100ML
25 INJECTION, SOLUTION INTRAVENOUS CONTINUOUS
Status: DISCONTINUED | OUTPATIENT
Start: 2022-01-18 | End: 2022-01-18 | Stop reason: HOSPADM

## 2022-01-18 RX ORDER — LIDOCAINE HYDROCHLORIDE 10 MG/ML
0.1 INJECTION, SOLUTION EPIDURAL; INFILTRATION; INTRACAUDAL; PERINEURAL AS NEEDED
Status: DISCONTINUED | OUTPATIENT
Start: 2022-01-18 | End: 2022-01-18 | Stop reason: HOSPADM

## 2022-01-18 RX ORDER — FENTANYL CITRATE 50 UG/ML
50 INJECTION, SOLUTION INTRAMUSCULAR; INTRAVENOUS AS NEEDED
Status: DISCONTINUED | OUTPATIENT
Start: 2022-01-18 | End: 2022-01-18 | Stop reason: HOSPADM

## 2022-01-18 RX ORDER — DEXMEDETOMIDINE HYDROCHLORIDE 100 UG/ML
INJECTION, SOLUTION INTRAVENOUS AS NEEDED
Status: DISCONTINUED | OUTPATIENT
Start: 2022-01-18 | End: 2022-01-18 | Stop reason: HOSPADM

## 2022-01-18 RX ORDER — NEOSTIGMINE METHYLSULFATE 1 MG/ML
INJECTION, SOLUTION INTRAVENOUS AS NEEDED
Status: DISCONTINUED | OUTPATIENT
Start: 2022-01-18 | End: 2022-01-18 | Stop reason: HOSPADM

## 2022-01-18 RX ORDER — FENTANYL CITRATE 50 UG/ML
INJECTION, SOLUTION INTRAMUSCULAR; INTRAVENOUS AS NEEDED
Status: DISCONTINUED | OUTPATIENT
Start: 2022-01-18 | End: 2022-01-18 | Stop reason: HOSPADM

## 2022-01-18 RX ORDER — PHENYLEPHRINE HCL IN 0.9% NACL 0.4MG/10ML
SYRINGE (ML) INTRAVENOUS AS NEEDED
Status: DISCONTINUED | OUTPATIENT
Start: 2022-01-18 | End: 2022-01-18 | Stop reason: HOSPADM

## 2022-01-18 RX ORDER — SUCCINYLCHOLINE CHLORIDE 20 MG/ML
INJECTION INTRAMUSCULAR; INTRAVENOUS AS NEEDED
Status: DISCONTINUED | OUTPATIENT
Start: 2022-01-18 | End: 2022-01-18 | Stop reason: HOSPADM

## 2022-01-18 RX ORDER — GLYCOPYRROLATE 0.2 MG/ML
INJECTION INTRAMUSCULAR; INTRAVENOUS AS NEEDED
Status: DISCONTINUED | OUTPATIENT
Start: 2022-01-18 | End: 2022-01-18 | Stop reason: HOSPADM

## 2022-01-18 RX ORDER — ONDANSETRON 2 MG/ML
INJECTION INTRAMUSCULAR; INTRAVENOUS AS NEEDED
Status: DISCONTINUED | OUTPATIENT
Start: 2022-01-18 | End: 2022-01-18 | Stop reason: HOSPADM

## 2022-01-18 RX ORDER — ROCURONIUM BROMIDE 10 MG/ML
INJECTION, SOLUTION INTRAVENOUS AS NEEDED
Status: DISCONTINUED | OUTPATIENT
Start: 2022-01-18 | End: 2022-01-18 | Stop reason: HOSPADM

## 2022-01-18 RX ORDER — DEXAMETHASONE SODIUM PHOSPHATE 4 MG/ML
INJECTION, SOLUTION INTRA-ARTICULAR; INTRALESIONAL; INTRAMUSCULAR; INTRAVENOUS; SOFT TISSUE AS NEEDED
Status: DISCONTINUED | OUTPATIENT
Start: 2022-01-18 | End: 2022-01-18 | Stop reason: HOSPADM

## 2022-01-18 RX ORDER — ONDANSETRON 2 MG/ML
4 INJECTION INTRAMUSCULAR; INTRAVENOUS AS NEEDED
Status: DISCONTINUED | OUTPATIENT
Start: 2022-01-18 | End: 2022-01-18 | Stop reason: HOSPADM

## 2022-01-18 RX ORDER — SODIUM CHLORIDE 9 MG/ML
25 INJECTION, SOLUTION INTRAVENOUS CONTINUOUS
Status: DISCONTINUED | OUTPATIENT
Start: 2022-01-18 | End: 2022-01-18 | Stop reason: HOSPADM

## 2022-01-18 RX ORDER — INSULIN GLARGINE 100 [IU]/ML
20 INJECTION, SOLUTION SUBCUTANEOUS DAILY
COMMUNITY

## 2022-01-18 RX ORDER — HYDROCODONE BITARTRATE AND ACETAMINOPHEN 5; 325 MG/1; MG/1
1 TABLET ORAL
Qty: 30 TABLET | Refills: 0 | Status: SHIPPED | OUTPATIENT
Start: 2022-01-18 | End: 2022-01-25

## 2022-01-18 RX ORDER — HYDROMORPHONE HYDROCHLORIDE 1 MG/ML
.2-.5 INJECTION, SOLUTION INTRAMUSCULAR; INTRAVENOUS; SUBCUTANEOUS
Status: DISCONTINUED | OUTPATIENT
Start: 2022-01-18 | End: 2022-01-18 | Stop reason: HOSPADM

## 2022-01-18 RX ORDER — FENTANYL CITRATE 50 UG/ML
25 INJECTION, SOLUTION INTRAMUSCULAR; INTRAVENOUS
Status: DISCONTINUED | OUTPATIENT
Start: 2022-01-18 | End: 2022-01-18 | Stop reason: HOSPADM

## 2022-01-18 RX ADMIN — FENTANYL CITRATE 50 MCG: 50 INJECTION, SOLUTION INTRAMUSCULAR; INTRAVENOUS at 07:31

## 2022-01-18 RX ADMIN — HEPARIN SODIUM 1800 UNITS: 1000 INJECTION, SOLUTION INTRAVENOUS; SUBCUTANEOUS at 10:31

## 2022-01-18 RX ADMIN — Medication 80 MCG: at 07:50

## 2022-01-18 RX ADMIN — GLYCOPYRROLATE 0.2 MG: 0.2 INJECTION, SOLUTION INTRAMUSCULAR; INTRAVENOUS at 08:41

## 2022-01-18 RX ADMIN — Medication 80 MCG: at 07:47

## 2022-01-18 RX ADMIN — Medication 80 MCG: at 08:18

## 2022-01-18 RX ADMIN — Medication 3 MG: at 08:54

## 2022-01-18 RX ADMIN — ONDANSETRON HYDROCHLORIDE 4 MG: 2 INJECTION, SOLUTION INTRAMUSCULAR; INTRAVENOUS at 07:40

## 2022-01-18 RX ADMIN — Medication 120 MCG: at 08:11

## 2022-01-18 RX ADMIN — ROCURONIUM BROMIDE 20 MG: 10 INJECTION INTRAVENOUS at 07:37

## 2022-01-18 RX ADMIN — Medication 120 MCG: at 08:41

## 2022-01-18 RX ADMIN — VANCOMYCIN HYDROCHLORIDE 1500 MG: 5 INJECTION, POWDER, LYOPHILIZED, FOR SOLUTION INTRAVENOUS at 06:50

## 2022-01-18 RX ADMIN — DEXMEDETOMIDINE HYDROCHLORIDE 4 MCG: 100 INJECTION, SOLUTION, CONCENTRATE INTRAVENOUS at 08:56

## 2022-01-18 RX ADMIN — PROPOFOL 150 MG: 10 INJECTION, EMULSION INTRAVENOUS at 07:33

## 2022-01-18 RX ADMIN — DEXMEDETOMIDINE HYDROCHLORIDE 6 MCG: 100 INJECTION, SOLUTION, CONCENTRATE INTRAVENOUS at 08:54

## 2022-01-18 RX ADMIN — WATER 2 G: 1 INJECTION INTRAMUSCULAR; INTRAVENOUS; SUBCUTANEOUS at 07:40

## 2022-01-18 RX ADMIN — Medication 80 MCG: at 08:24

## 2022-01-18 RX ADMIN — FENTANYL CITRATE 50 MCG: 50 INJECTION, SOLUTION INTRAMUSCULAR; INTRAVENOUS at 08:02

## 2022-01-18 RX ADMIN — Medication 3 AMPULE: at 06:50

## 2022-01-18 RX ADMIN — SUCCINYLCHOLINE CHLORIDE 160 MG: 20 INJECTION, SOLUTION INTRAMUSCULAR; INTRAVENOUS at 07:33

## 2022-01-18 RX ADMIN — SODIUM CHLORIDE 25 ML/HR: 9 INJECTION, SOLUTION INTRAVENOUS at 06:49

## 2022-01-18 RX ADMIN — GLYCOPYRROLATE 0.2 MG: 0.2 INJECTION, SOLUTION INTRAMUSCULAR; INTRAVENOUS at 08:54

## 2022-01-18 RX ADMIN — DEXAMETHASONE SODIUM PHOSPHATE 4 MG: 4 INJECTION, SOLUTION INTRAMUSCULAR; INTRAVENOUS at 07:40

## 2022-01-18 NOTE — PERIOP NOTES
Peritoneal Dialysis Catheter [place] Neck Curl Cath, 2 Cuffs Left 62.5 cm inserted in OR by Dr. Barbara Conley. Documented in Lines/Drains.

## 2022-01-18 NOTE — PERIOP NOTES
[address] Ken Rodriguez from Nurse    PATIENT INSTRUCTIONS:    After general anesthesia or intravenous sedation, for 24 hours or while taking prescription Narcotics:  · Limit your activities  · Do not drive and operate hazardous machinery  · Do not make important personal or business decisions  · Do  not drink alcoholic beverages  · If you have not urinated within 8 hours after discharge, please contact your surgeon on call. Report the following to your surgeon:  · Excessive pain, swelling, redness or odor of or around the surgical area  · Temperature over 100.5  · Nausea and vomiting lasting longer than 4 hours or if unable to take medications  · Any signs of decreased circulation or nerve impairment to extremity: change in color, persistent  numbness, tingling, coldness or increase pain  · Any questions    These are general instructions for a healthy lifestyle:    No smoking/ No tobacco products/ Avoid exposure to second hand smoke  Surgeon General's Warning:  Quitting smoking now greatly reduces serious risk to your health. Obesity, smoking, and sedentary lifestyle greatly increases your risk for illness    A healthy diet, regular physical exercise & weight monitoring are important for maintaining a healthy lifestyle    You may be retaining fluid if you have a history of heart failure or if you experience any of the following symptoms:  Weight gain of 3 pounds or more overnight or 5 pounds in a week, increased swelling in our hands or feet or shortness of breath while lying flat in bed. Please call your doctor as soon as you notice any of these symptoms; do not wait until your next office visit. The discharge information has been reviewed with the patient and daughter. The patient and daughter verbalized understanding.   Discharge medications reviewed with the patient and daughter and appropriate educational materials and side effects teaching were provided.   ___________________________________________________________________________________________________________________________________

## 2022-01-18 NOTE — PERIOP NOTES
Handoff Report from Operating Room to PACU    Report received from Nelli 4881 and Bianca [address] regarding Nils Bruno. Surgeon(s):  Roque Rivera MD  And Procedure(s) (LRB):  LAPAROSCOPIC PLACEMENT OF A PERITONEAL DIALYSIS CATHETER; LYSIS OF ADHESIONS (N/A)  confirmed   with allergies and dressings discussed. Anesthesia type, drugs, patient history, complications, estimated blood loss, vital signs, intake and output, and last pain medication, lines, reversal medications and temperature were reviewed. 1050- Discharge instructions reviewed by Kassie Solo with daughter. Assited patient to get dressed. Patient transferred to surgical en trace via wheelchairs with personal belongings by Kassie Solo. Daughter awaiting to pick patient up.

## 2022-01-18 NOTE — ANESTHESIA POSTPROCEDURE EVALUATION
Procedure(s):  LAPAROSCOPIC PLACEMENT OF A PERITONEAL DIALYSIS CATHETER; LYSIS OF ADHESIONS. general    Anesthesia Post Evaluation      Multimodal analgesia: multimodal analgesia used between 6 hours prior to anesthesia start to PACU discharge  Patient location during evaluation: PACU  Patient participation: complete - patient participated  Level of consciousness: sleepy but conscious and responsive to verbal stimuli  Pain score: 2  Pain management: adequate  Airway patency: patent  Anesthetic complications: no  Cardiovascular status: acceptable  Respiratory status: acceptable  Hydration status: acceptable  Comments: +Post-Anesthesia Evaluation and Assessment    Patient: Marv Garcia MRN: [id number]  SSN: [id number]   YOB: 1955  Age: 78 y.o. Sex: female      Cardiovascular Function/Vital Signs    BP (!) 158/66   Pulse 98   Temp 37 °C (98.6 °F)   Resp 18   Ht 5' 4\" (1.626 m)   Wt 90.3 kg (199 lb 1.2 oz)   SpO2 96%   BMI 34.17 kg/m²     Patient is status post Procedure(s):  LAPAROSCOPIC PLACEMENT OF A PERITONEAL DIALYSIS CATHETER; LYSIS OF ADHESIONS. Nausea/Vomiting: Controlled. Postoperative hydration reviewed and adequate. Pain:  Pain Scale 1: Numeric (0 - 10) (01/18/22 9193)  Pain Intensity 1: 0 (01/18/22 3809)   Managed. Neurological Status:   Neuro (WDL): Within Defined Limits (01/18/22 2181)   At baseline. Mental Status and Level of Consciousness: Arousable. Pulmonary Status:   O2 Device: Nasal cannula (01/18/22 0915)   Adequate oxygenation and airway patent. Complications related to anesthesia: None    Post-anesthesia assessment completed. No concerns.     Signed By: Ralph Venegas MD    1/18/2022  Post anesthesia nausea and vomiting:  controlled  Final Post Anesthesia Temperature Assessment:  Normothermia (36.0-37.5 degrees C)      INITIAL Post-op Vital signs:   Vitals Value Taken Time   /66 01/18/22 0915   Temp 37 °C (98.6 °F) 01/18/22 0915   Pulse 96 01/18/22 0920   Resp 17 01/18/22 0917   SpO2 96 % 01/18/22 0920   Vitals shown include unvalidated device data.

## 2022-01-18 NOTE — H&P
History and Physical    Subjective:     Catherine Hayward is a 78 y.o. female who has ESRD and wants PD access. Past Medical History:   Diagnosis Date    Asthma     CAD (coronary artery disease)     [facility]     Chronic kidney disease     HD  Radu Menon [phone]    COPD (chronic obstructive pulmonary disease) [facility]     Diabetes ([facility]     DM2    GERD (gastroesophageal reflux disease)     Hypertension       Past Surgical History:   Procedure Laterality Date    DELIVERY       HX CORONARY STENT PLACEMENT      HX HYSTERECTOMY      IR INSERT NON TUNL CVC OVER 5 YRS  12/3/2021    IR INSERT TUNL CVC W/O PORT OVER 5 YR  2021    [place] CARDIAC SURG PROCEDURE UNLIST      VASCULAR SURGERY PROCEDURE UNLIST      leg stent     Family History   Problem Relation Age of Onset    Heart Disease Mother     Cancer Father       Social History     Tobacco Use    Smoking status: Former Smoker     Packs/day: 0.25     Years: 10.00     Pack years: 2.50     Quit date: 2021     Years since quittin.2    Smokeless tobacco: Never Used   Substance Use Topics    Alcohol use: No       Prior to Admission medications    Medication Sig Start Date End Date Taking? Authorizing Provider   dulaglutide (Trulicity) 5.78 XY/3.6 mL sub-q pen 0.75 mg by SubCUTAneous route every seven (7) days. Unsure of dosage   Yes Provider, Historical   insulin glargine (LANTUS,BASAGLAR) 100 unit/mL (3 mL) inpn 20 Units by SubCUTAneous route. Yes Provider, Historical   rosuvastatin (CRESTOR) 20 mg tablet Take 0.5 Tablets by mouth nightly. Take 1 Tab by mouth nightly. Dose reduced from 20 mg to 10 mg. 12/10/21  Yes Anupam Brambila MD   epoetin joni-epbx (RETACRIT) 20,000 unit/mL injection 1 mL by SubCUTAneous route every Monday, Wednesday, Friday.  Indications: anemia due to kidney failure  Indications: anemia due to kidney failure 12/10/21  Yes Anupam Brambila MD   hydrALAZINE (APRESOLINE) 50 mg tablet Take 1 Tablet by mouth three (3) times daily. 12/10/21  Yes Lore Ramos MD   mometasone-formoterol (DULERA) 200-5 mcg/actuation HFA inhaler Take 2 Puffs by inhalation two (2) times a day. Yes Provider, Historical   NIFEdipine ER (PROCARDIA XL) 60 mg ER tablet Take 1 Tab by mouth daily. 7/9/18  Yes Savannah Yates MD   metoprolol succinate (TOPROL XL) 100 mg tablet Take 100 mg by mouth daily. Yes Provider, Historical   albuterol-ipratropium (DUO-NEB) 2.5 mg-0.5 mg/3 ml nebu 3 mL by Nebulization route every six (6) hours as needed. 3/19/18  Yes Savannah Yates MD   montelukast (SINGULAIR) 10 mg tablet Take 1 Tab by mouth nightly. 3/19/18  Yes Savannah Yates MD   aspirin (ASPIRIN) 325 mg tablet Take 325 mg by mouth daily. Yes Other, MD Tony   insulin NPH/insulin regular (NOVOLIN 70/30 U-100 INSULIN) 100 unit/mL (70-30) injection 45 Units by SubCUTAneous route two (2) times a day. Patient not taking: Reported on 1/18/2022 7/9/18   Savannah Yates MD     Allergies   Allergen Reactions    Ivp Dye [Fd And C Blue No.1] Hives        Review of Systems:  Denies CP/SOB    Objective:     Physical Exam:   Visit Vitals  BP (!) 150/83 (BP 1 Location: Right upper arm, BP Patient Position: At rest;Sitting)   Pulse (!) 109   Temp 98.3 °F (36.8 °C)   Resp 19   Ht 5' 4\" (1.626 m)   Wt 90.3 kg (199 lb 1.2 oz)   SpO2 100%   BMI 34.17 kg/m²     General:  Alert, cooperative, no distress, appears stated age. Obese. Head:  Normocephalic, without obvious abnormality, atraumatic. Neck: Supple, symmetrical, trachea midline, no adenopathy, thyroid: no enlargement/tenderness/nodules, no carotid bruit and no JVD. Lungs:   Clear to auscultation bilaterally. Chest wall:  No tenderness or deformity. Heart:  Regular rate and rhythm, S1, S2 normal, no murmur, click, rub or gallop. Extremities: Extremities normal, atraumatic, no cyanosis or edema.    Pulses: 2+ and symmetric all extremities. Neurologic: Normal strength, sensation throughout.        Assessment:     ESRD    Plan:     Laparoscopic PD catheter insertion    Signed By: Candi Marshall MD     January 18, 2022

## 2022-01-18 NOTE — DISCHARGE INSTRUCTIONS
Patient Discharge Instructions    Beryle Mako / [id number] : 1955    Admitted 2022 Discharged: 2022     Take Home Medications     · It is important that you take the medication exactly as they are prescribed. · Keep your medication in the bottles provided by the pharmacist and keep a list of the medication names, dosages, and times to be taken in your wallet. · Do not take other medications without consulting your doctor. What to do at [address] [place] [address] [place]: Follow up with the PD nurse educator to learn how to change bandage at the catheter exit site. Recommended diet: Renal    Recommended activity: As Tolerated. No Strenuous activity or heavy lifting    If you experience any of the following symptoms worsening severe abdominal pain, or increasing redness and drainage from the incisions, please follow up with Dr Martha Flores immediately or go to the ER. Follow-up with Dr Martha Flores if you have any concerns about the incisions. Call [phone] for appointment. Follow up with your PD nurse educator ASAP to learn how to care for the catheter. Information obtained by :  I understand that if any problems occur once I am at home I am to contact my physician. I understand and acknowledge receipt of the instructions indicated above.                                                                                                                                            Physician's or R.N.'s Signature                                                                  Date/Time                                                                                                                                              Patient or Representative Signature                                                          Date/Time                How to Care for Your Wound After Its Treated With  DERMABOND* Topical Skin Adhesive  DERMABOND* Topical Skin Adhesive (2-octyl cyanoacrylate) is a sterile, liquid skin adhesive  that holds wound edges together. The film will usually remain in place for 5 to 10 days, then  naturally fall off your skin. The following will answer some of your questions and provide instructions for proper care for your  wound while it is healing:    CHECK WOUND APPEARANCE   Some swelling, redness, and pain are common with all wounds and normally will go away as the  wound heals. If swelling, redness, or pain increases or if the wound feels warm to the touch,  contact a doctor. Also contact a doctor if the wound edges reopen or separate. REPLACE BANDAGES   If your wound is bandaged, keep the bandage dry.  Replace the dressing daily until the adhesive film has fallen off or if the  bandage should become wet, unless otherwise instructed by your  physician.  When changing the dressing, do not place tape directly over the  DERMABOND adhesive film, because removing the tape later may also  remove the film. AVOID TOPICAL MEDICATIONS   Do not apply liquid or ointment medications or any other product to your wound while the  DERMABOND adhesive film is in place. These may loosen the film before your wound is healed. KEEP WOUND DRY AND PROTECTED   You may occasionally and briefly wet your wound in the shower or bath. Do not soak or scrub  your wound, do not swim, and avoid periods of heavy perspiration until the DERMABOND  adhesive has naturally fallen off. After showering or bathing, gently blot your wound dry with a  soft towel. If a protective dressing is being used, apply a fresh, dry bandage, being sure to keep  the tape off the DERMABOND adhesive film.  Apply a clean, dry bandage over the wound if necessary to protect it.  Protect your wound from injury until the skin has had sufficient time to heal.   Do not scratch, rub, or pick at the DERMABOND adhesive film. This may loosen the film before  your wound is healed.    Protect the wound from prolonged exposure to sunlight or tanning lamps while the film is in  place. If you have any questions or concerns about this product, please consult your doctor. *Trademark ©[employer] 2002            TO PREVENT AN INFECTION      1. [address] YOUR HANDS     To prevent infection, good handwashing is the most important thing you or your caregiver can do.  Wash your hands with soap and water or use the hand  we gave you before you touch any wounds. 2. SHOWER     Use the antibacterial soap we gave you when you take a shower.  Shower with this soap until your wounds are healed.  To reach all areas of your body, you may need someone to help you.  Dont forget to clean your belly button with every shower. 3.  USE CLEAN SHEETS     Use freshly cleaned sheets on your bed after surgery.  To keep the surgery site clean, do not allow pets to sleep with you while your wound is still healing. 4. STOP SMOKING     Stop smoking, or at least cut back on smoking     Smoking slows your healing. 5.  CONTROL YOUR BLOOD SUGAR     High blood sugars slow wound healing.  If you are diabetic, control your blood sugar levels before and after your surgery. DISCHARGE SUMMARY from Nurse    PATIENT INSTRUCTIONS:    After general anesthesia or intravenous sedation, for 24 hours or while taking prescription Narcotics:  · Limit your activities  · Do not drive and operate hazardous machinery  · Do not make important personal or business decisions  · Do  not drink alcoholic beverages  · If you have not urinated within 8 hours after discharge, please contact your surgeon on call.     Report the following to your surgeon:  · Excessive pain, swelling, redness or odor of or around the surgical area  · Temperature over 100.5  · Nausea and vomiting lasting longer than 4 hours or if unable to take medications  · Any signs of decreased circulation or nerve impairment to extremity: change in color, persistent  numbness, tingling, coldness or increase pain  · Any questions        Patient Education      Hydrocodone/Acetaminophen (Vicodin, Norco, Lortab) - (By mouth)   Why this medicine is used:   Treats pain. Contact a nurse or doctor right away if you have:  · Blistering, peeling, red skin rash  · Fast or slow heartbeat, shallow breathing, blue lips, fingernails, or skin  · Anxiety, restlessness, muscle spasms, twitching, seeing or hearing things that are not there  · Dark urine or pale stools, yellow skin or eyes  · Extreme weakness, sweating, seizures, cold or clammy skin  · Lightheadedness, dizziness, fainting, fever, sweating     Common side effects:  · Constipation, nausea, vomiting, loss of appetite, stomach pain  · Tiredness or sleepiness  © 2017 [facility] Information is for End User's use only and may not be sold, redistributed or otherwise used for commercial purposes.

## 2022-01-18 NOTE — BRIEF OP NOTE
Brief Postoperative Note    Patient: Mika Seth  YOB: 1955  MRN: [id number]    Date of Procedure: 1/18/2022     Pre-Op Diagnosis: END STAGE RENAL    Post-Op Diagnosis: Same as preoperative diagnosis. Procedure(s):  LAPAROSCOPIC PLACEMENT OF A PERITONEAL DIALYSIS CATHETER; LYSIS OF ADHESIONS    Surgeon(s):  Georgette Sanz MD    Surgical Assistant: Surg Asst-1: Harsha Davis    Anesthesia: General     Estimated Blood Loss (mL): less than 50     Complications: None    Specimens: * No specimens in log *     Implants: * No implants in log *    Drains:   Orogastric Tube 01/18/22 (Active)       Findings: Extensive adhesions at pelvic inlet. Catheter placed after VIRGILIO.     Electronically Signed by Johan Medina MD on 1/18/2022 at 9:07 AM

## 2022-01-23 NOTE — OP NOTES
[address]  OPERATIVE REPORT    Name:  Stephanie Ricketts  MR#:  [id number]  :  1955  ACCOUNT #:  [de-identified]  DATE OF SERVICE:  2022    PREOPERATIVE DIAGNOSIS:  End-stage renal disease. POSTOPERATIVE DIAGNOSIS:  End-stage renal disease. PROCEDURE PERFORMED:  1. Laparoscopic placement of peritoneal dialysis catheter. 2.  Laparoscopic lysis of adhesions. SURGEON:  Johan Medina MD    ASSISTANT:  None. ANESTHESIA:  General.    COMPLICATIONS:  None. SPECIMENS REMOVED:  None. IMPLANTS:  Peritoneal dialysis catheter. ESTIMATED BLOOD LOSS:  50 mL. DRAINS:  None. INDICATIONS:  The patient is a 50-year-old female with end-stage renal disease, who requires dialysis access. She has chosen peritoneal dialysis and presents for laparoscopic peritoneal dialysis catheter insertion. PROCEDURE:  After informed consent was obtained, the patient was given preoperative intravenous antibiotics within 1 hour of the incision. After induction of adequate general anesthesia, the abdomen was prepped and draped as a sterile field. A small transverse incision was made several centimeters cephalad and to the left of the umbilicus. Dissection was carried down to the subcutaneous tissue with electrocautery and the anterior rectus sheath was incised. The rectus fibers were spread atraumatically and a 2-0 Prolene pursestring suture was placed in the posterior rectus sheath. While elevating the posterior sheath, a small incision was made in the center of the pursestring suture with a 11-blade and the peritoneal cavity was entered under direct vision without difficulty. A 5-mm trocar was inserted and pneumoperitoneum was established. The laparoscope was inserted and a general survey of the abdomen was unremarkable with the exception of significant adhesions in the lower abdomen, especially in the midline.     Under direct vision, a second 5-mm trocar was inserted in the right mid abdomen. The laparoscope was moved to this location and through the first trocar site, grasper was introduced. The patient was placed in Trendelenburg position and the lower abdomen was more carefully explored. The adhesions were preventing access to the true pelvis and therefore, it was necessary to perform a lysis of adhesions to insert the catheter. Some of the adhesions, which were more loose adhesions between the anterior abdominal wall and the omentum were taken down sharply with scissors. It then became apparent that there were more extensive adhesions and a third trocar was placed in the left mid abdomen and a laparoscopic harmonic scalpel was used. Over 30 minutes was then devoted to lysing the adhesions between the lower posterior abdominal wall and the omentum and small bowel. This allowed access to the true pelvis between the sigmoid colon and a few remaining adhesions. The peritoneal dialysis catheter was then advanced down a preperitoneal tunnel, where a bariatric 5-mm trocar was advanced posterior to the rectus sheath at the site of the original incision and then inserted into the true peritoneum at the level of the pelvic inlet. The catheter was placed through this preperitoneal tunnel and the 5-mm bariatric trocar was removed. Under direct laparoscopic vision, a grasper was used to place the curled tip of the peritoneal dialysis catheter in the true pelvis. The patient was then placed in the reverse Trendelenburg position and the pneumoperitoneum was slowly released and the abdominal wall was allowed to relax and the catheter was maintaining good position. All trocars were removed. The small defect in the peritoneum at the site of the initial trocar insertion was closed with the pursestring Prolene suture and this was also used to secure the inner cuff to the posterior rectus sheath. The rectus fibers were allowed to relax around the inner cuff.   The anterior rectus sheath was closed with 0 Prolene suture. The catheter was then tunneled through a separate exit site inferior and to the left of the original incision. The outer cuff was allowed to lay within the subcutaneous fat. The titanium connectors were applied. The catheter flushed and aspirated well with a syringe and drained nicely to gravity. It was flushed with heparinized saline and capped and clamped. The subcutaneous tissue was closed with 3-0 Vicryl suture and the skin with 4-0 Monocryl suture. Dermabond was applied to the initial incision and the two additional trocar sites and a drain sponge was applied at the catheter exit site and the catheter was covered with bandage. The patient was extubated and transferred to the PACU in stable condition. All counts were correct.         Chery Howard MD      [id number]  D:  01/23/2022 0:44  T:  01/23/2022 2:48  JOB #:  [id number]

## 2022-03-18 PROBLEM — I21.4 NSTEMI (NON-ST ELEVATED MYOCARDIAL INFARCTION) (HCC): Status: ACTIVE | Noted: 2019-04-25

## 2022-03-19 PROBLEM — R60.0 BILATERAL LEG EDEMA: Status: ACTIVE | Noted: 2018-03-15

## 2022-03-20 PROBLEM — N17.9 ACUTE RENAL FAILURE (ARF) (HCC): Status: ACTIVE | Noted: 2021-12-02

## 2022-05-15 ENCOUNTER — APPOINTMENT (OUTPATIENT)
Dept: GENERAL RADIOLOGY | Age: 67
DRG: 291 | End: 2022-05-15
Attending: EMERGENCY MEDICINE
Payer: MEDICARE

## 2022-05-15 ENCOUNTER — HOSPITAL ENCOUNTER (INPATIENT)
Age: 67
LOS: 2 days | Discharge: HOME HEALTH | DRG: 291 | End: 2022-05-17
Attending: EMERGENCY MEDICINE | Admitting: HOSPITALIST
Payer: MEDICARE

## 2022-05-15 DIAGNOSIS — Z20.822 SUSPECTED COVID-19 VIRUS INFECTION: ICD-10-CM

## 2022-05-15 DIAGNOSIS — A41.9 SEVERE SEPSIS (HCC): ICD-10-CM

## 2022-05-15 DIAGNOSIS — Z98.890 HISTORY OF PERITONEAL DIALYSIS: ICD-10-CM

## 2022-05-15 DIAGNOSIS — J44.1 ACUTE EXACERBATION OF CHRONIC OBSTRUCTIVE PULMONARY DISEASE (COPD) (HCC): ICD-10-CM

## 2022-05-15 DIAGNOSIS — R65.20 SEVERE SEPSIS (HCC): ICD-10-CM

## 2022-05-15 DIAGNOSIS — R50.9 ACUTE FEBRILE ILLNESS: Primary | ICD-10-CM

## 2022-05-15 PROBLEM — R06.02 SOB (SHORTNESS OF BREATH): Status: ACTIVE | Noted: 2022-05-15

## 2022-05-15 LAB
ALBUMIN SERPL-MCNC: 2.4 G/DL (ref 3.5–5)
ALBUMIN/GLOB SERPL: 0.4 (ref 1.1–2.2)
ALP SERPL-CCNC: 118 U/L (ref 45–117)
ALT SERPL-CCNC: 9 U/L (ref 12–78)
ANION GAP SERPL CALC-SCNC: 9 MMOL/L (ref 5–15)
AST SERPL-CCNC: 17 U/L (ref 15–37)
ATRIAL RATE: 110 BPM
B PERT DNA SPEC QL NAA+PROBE: NOT DETECTED
BASOPHILS # BLD: 0.1 K/UL (ref 0–0.1)
BASOPHILS NFR BLD: 1 % (ref 0–1)
BILIRUB SERPL-MCNC: 0.5 MG/DL (ref 0.2–1)
BORDETELLA PARAPERTUSSIS PCR, BORPAR: NOT DETECTED
BUN SERPL-MCNC: 34 MG/DL (ref 6–20)
BUN/CREAT SERPL: 5 (ref 12–20)
C PNEUM DNA SPEC QL NAA+PROBE: NOT DETECTED
CALCIUM SERPL-MCNC: 7.9 MG/DL (ref 8.5–10.1)
CALCULATED R AXIS, ECG10: -144 DEGREES
CALCULATED T AXIS, ECG11: 60 DEGREES
CHLORIDE SERPL-SCNC: 99 MMOL/L (ref 97–108)
CO2 SERPL-SCNC: 26 MMOL/L (ref 21–32)
COMMENT, HOLDF: NORMAL
COVID-19 RAPID TEST, COVR: NOT DETECTED
CREAT SERPL-MCNC: 6.85 MG/DL (ref 0.55–1.02)
CRP SERPL-MCNC: 13.1 MG/DL (ref 0–0.6)
DIAGNOSIS, 93000: NORMAL
DIFFERENTIAL METHOD BLD: ABNORMAL
EOSINOPHIL # BLD: 0.2 K/UL (ref 0–0.4)
EOSINOPHIL NFR BLD: 2 % (ref 0–7)
ERYTHROCYTE [DISTWIDTH] IN BLOOD BY AUTOMATED COUNT: 15.1 % (ref 11.5–14.5)
FLUAV AG NPH QL IA: NEGATIVE
FLUAV H1 2009 PAND RNA SPEC QL NAA+PROBE: NOT DETECTED
FLUAV H1 RNA SPEC QL NAA+PROBE: NOT DETECTED
FLUAV H3 RNA SPEC QL NAA+PROBE: NOT DETECTED
FLUAV SUBTYP SPEC NAA+PROBE: NOT DETECTED
FLUBV AG NOSE QL IA: NEGATIVE
FLUBV RNA SPEC QL NAA+PROBE: NOT DETECTED
GLOBULIN SER CALC-MCNC: 5.7 G/DL (ref 2–4)
GLUCOSE BLD STRIP.AUTO-MCNC: 389 MG/DL (ref 65–117)
GLUCOSE BLD STRIP.AUTO-MCNC: 467 MG/DL (ref 65–117)
GLUCOSE BLD STRIP.AUTO-MCNC: 487 MG/DL (ref 65–117)
GLUCOSE BLD STRIP.AUTO-MCNC: 536 MG/DL (ref 65–117)
GLUCOSE BLD STRIP.AUTO-MCNC: 537 MG/DL (ref 65–117)
GLUCOSE SERPL-MCNC: 356 MG/DL (ref 65–100)
HADV DNA SPEC QL NAA+PROBE: NOT DETECTED
HCOV 229E RNA SPEC QL NAA+PROBE: NOT DETECTED
HCOV HKU1 RNA SPEC QL NAA+PROBE: NOT DETECTED
HCOV NL63 RNA SPEC QL NAA+PROBE: NOT DETECTED
HCOV OC43 RNA SPEC QL NAA+PROBE: NOT DETECTED
HCT VFR BLD AUTO: 27.2 % (ref 35–47)
HGB BLD-MCNC: 8.6 G/DL (ref 11.5–16)
HMPV RNA SPEC QL NAA+PROBE: NOT DETECTED
HPIV1 RNA SPEC QL NAA+PROBE: NOT DETECTED
HPIV2 RNA SPEC QL NAA+PROBE: NOT DETECTED
HPIV3 RNA SPEC QL NAA+PROBE: DETECTED
HPIV4 RNA SPEC QL NAA+PROBE: NOT DETECTED
IMM GRANULOCYTES # BLD AUTO: 0.1 K/UL (ref 0–0.04)
IMM GRANULOCYTES NFR BLD AUTO: 1 % (ref 0–0.5)
LACTATE SERPL-SCNC: 1.1 MMOL/L (ref 0.4–2)
LACTATE SERPL-SCNC: 2.1 MMOL/L (ref 0.4–2)
LYMPHOCYTES # BLD: 1.1 K/UL (ref 0.8–3.5)
LYMPHOCYTES NFR BLD: 13 % (ref 12–49)
M PNEUMO DNA SPEC QL NAA+PROBE: NOT DETECTED
MCH RBC QN AUTO: 31.4 PG (ref 26–34)
MCHC RBC AUTO-ENTMCNC: 31.6 G/DL (ref 30–36.5)
MCV RBC AUTO: 99.3 FL (ref 80–99)
MONOCYTES # BLD: 0.6 K/UL (ref 0–1)
MONOCYTES NFR BLD: 8 % (ref 5–13)
NEUTS SEG # BLD: 6.3 K/UL (ref 1.8–8)
NEUTS SEG NFR BLD: 75 % (ref 32–75)
NRBC # BLD: 0 K/UL (ref 0–0.01)
NRBC BLD-RTO: 0 PER 100 WBC
P-R INTERVAL, ECG05: 148 MS
PLATELET # BLD AUTO: 278 K/UL (ref 150–400)
PMV BLD AUTO: 10.8 FL (ref 8.9–12.9)
POTASSIUM SERPL-SCNC: 3.4 MMOL/L (ref 3.5–5.1)
PROCALCITONIN SERPL-MCNC: 0.94 NG/ML
PROT SERPL-MCNC: 8.1 G/DL (ref 6.4–8.2)
Q-T INTERVAL, ECG07: 380 MS
QRS DURATION, ECG06: 86 MS
QTC CALCULATION (BEZET), ECG08: 514 MS
RBC # BLD AUTO: 2.74 M/UL (ref 3.8–5.2)
RSV RNA SPEC QL NAA+PROBE: NOT DETECTED
RV+EV RNA SPEC QL NAA+PROBE: NOT DETECTED
SAMPLES BEING HELD,HOLD: NORMAL
SARS-COV-2 PCR, COVPCR: NOT DETECTED
SERVICE CMNT-IMP: ABNORMAL
SODIUM SERPL-SCNC: 134 MMOL/L (ref 136–145)
SOURCE, COVRS: NORMAL
VENTRICULAR RATE, ECG03: 110 BPM
WBC # BLD AUTO: 8.3 K/UL (ref 3.6–11)

## 2022-05-15 PROCEDURE — 87635 SARS-COV-2 COVID-19 AMP PRB: CPT

## 2022-05-15 PROCEDURE — 87804 INFLUENZA ASSAY W/OPTIC: CPT

## 2022-05-15 PROCEDURE — 96372 THER/PROPH/DIAG INJ SC/IM: CPT

## 2022-05-15 PROCEDURE — 3E1M39Z IRRIGATION OF PERITONEAL CAVITY USING DIALYSATE, PERCUTANEOUS APPROACH: ICD-10-PCS | Performed by: HOSPITALIST

## 2022-05-15 PROCEDURE — 94761 N-INVAS EAR/PLS OXIMETRY MLT: CPT

## 2022-05-15 PROCEDURE — 36415 COLL VENOUS BLD VENIPUNCTURE: CPT

## 2022-05-15 PROCEDURE — 74011250636 HC RX REV CODE- 250/636: Performed by: HOSPITALIST

## 2022-05-15 PROCEDURE — 74011636637 HC RX REV CODE- 636/637: Performed by: HOSPITALIST

## 2022-05-15 PROCEDURE — 96361 HYDRATE IV INFUSION ADD-ON: CPT

## 2022-05-15 PROCEDURE — 96365 THER/PROPH/DIAG IV INF INIT: CPT

## 2022-05-15 PROCEDURE — 74011250636 HC RX REV CODE- 250/636: Performed by: EMERGENCY MEDICINE

## 2022-05-15 PROCEDURE — 94640 AIRWAY INHALATION TREATMENT: CPT

## 2022-05-15 PROCEDURE — 86710 INFLUENZA VIRUS ANTIBODY: CPT

## 2022-05-15 PROCEDURE — 84145 PROCALCITONIN (PCT): CPT

## 2022-05-15 PROCEDURE — 96366 THER/PROPH/DIAG IV INF ADDON: CPT

## 2022-05-15 PROCEDURE — 74011250637 HC RX REV CODE- 250/637: Performed by: INTERNAL MEDICINE

## 2022-05-15 PROCEDURE — 74011000250 HC RX REV CODE- 250: Performed by: HOSPITALIST

## 2022-05-15 PROCEDURE — 83605 ASSAY OF LACTIC ACID: CPT

## 2022-05-15 PROCEDURE — 71045 X-RAY EXAM CHEST 1 VIEW: CPT

## 2022-05-15 PROCEDURE — 82962 GLUCOSE BLOOD TEST: CPT

## 2022-05-15 PROCEDURE — 74011000250 HC RX REV CODE- 250: Performed by: EMERGENCY MEDICINE

## 2022-05-15 PROCEDURE — A4726 DIALYS SOL FLD VOL > 5999CC: HCPCS | Performed by: INTERNAL MEDICINE

## 2022-05-15 PROCEDURE — 90945 DIALYSIS ONE EVALUATION: CPT

## 2022-05-15 PROCEDURE — 65660000001 HC RM ICU INTERMED STEPDOWN

## 2022-05-15 PROCEDURE — 74011250637 HC RX REV CODE- 250/637: Performed by: HOSPITALIST

## 2022-05-15 PROCEDURE — 99285 EMERGENCY DEPT VISIT HI MDM: CPT

## 2022-05-15 PROCEDURE — 74011250636 HC RX REV CODE- 250/636: Performed by: INTERNAL MEDICINE

## 2022-05-15 PROCEDURE — 0202U NFCT DS 22 TRGT SARS-COV-2: CPT

## 2022-05-15 PROCEDURE — 74011250637 HC RX REV CODE- 250/637: Performed by: EMERGENCY MEDICINE

## 2022-05-15 PROCEDURE — 85025 COMPLETE CBC W/AUTO DIFF WBC: CPT

## 2022-05-15 PROCEDURE — 86140 C-REACTIVE PROTEIN: CPT

## 2022-05-15 PROCEDURE — 96360 HYDRATION IV INFUSION INIT: CPT

## 2022-05-15 PROCEDURE — 96375 TX/PRO/DX INJ NEW DRUG ADDON: CPT

## 2022-05-15 PROCEDURE — 80053 COMPREHEN METABOLIC PANEL: CPT

## 2022-05-15 PROCEDURE — 93005 ELECTROCARDIOGRAM TRACING: CPT

## 2022-05-15 PROCEDURE — 87040 BLOOD CULTURE FOR BACTERIA: CPT

## 2022-05-15 PROCEDURE — 74011636637 HC RX REV CODE- 636/637: Performed by: NURSE PRACTITIONER

## 2022-05-15 RX ORDER — METOPROLOL SUCCINATE 50 MG/1
100 TABLET, EXTENDED RELEASE ORAL DAILY
Status: DISCONTINUED | OUTPATIENT
Start: 2022-05-15 | End: 2022-05-15 | Stop reason: ALTCHOICE

## 2022-05-15 RX ORDER — ONDANSETRON 2 MG/ML
4 INJECTION INTRAMUSCULAR; INTRAVENOUS
Status: DISCONTINUED | OUTPATIENT
Start: 2022-05-15 | End: 2022-05-17 | Stop reason: HOSPADM

## 2022-05-15 RX ORDER — INSULIN LISPRO 100 [IU]/ML
5 INJECTION, SOLUTION INTRAVENOUS; SUBCUTANEOUS ONCE
Status: COMPLETED | OUTPATIENT
Start: 2022-05-15 | End: 2022-05-15

## 2022-05-15 RX ORDER — MONTELUKAST SODIUM 10 MG/1
10 TABLET ORAL
Status: DISCONTINUED | OUTPATIENT
Start: 2022-05-15 | End: 2022-05-17 | Stop reason: HOSPADM

## 2022-05-15 RX ORDER — SODIUM CHLORIDE 0.9 % (FLUSH) 0.9 %
5-40 SYRINGE (ML) INJECTION AS NEEDED
Status: DISCONTINUED | OUTPATIENT
Start: 2022-05-15 | End: 2022-05-17 | Stop reason: HOSPADM

## 2022-05-15 RX ORDER — IPRATROPIUM BROMIDE AND ALBUTEROL SULFATE 2.5; .5 MG/3ML; MG/3ML
3 SOLUTION RESPIRATORY (INHALATION)
Status: DISCONTINUED | OUTPATIENT
Start: 2022-05-15 | End: 2022-05-17 | Stop reason: HOSPADM

## 2022-05-15 RX ORDER — SODIUM CHLORIDE 0.9 % (FLUSH) 0.9 %
5-40 SYRINGE (ML) INJECTION EVERY 8 HOURS
Status: DISCONTINUED | OUTPATIENT
Start: 2022-05-15 | End: 2022-05-17 | Stop reason: HOSPADM

## 2022-05-15 RX ORDER — IBUPROFEN 200 MG
4 TABLET ORAL AS NEEDED
Status: DISCONTINUED | OUTPATIENT
Start: 2022-05-15 | End: 2022-05-17 | Stop reason: HOSPADM

## 2022-05-15 RX ORDER — NIFEDIPINE 60 MG/1
60 TABLET, EXTENDED RELEASE ORAL DAILY
Status: DISCONTINUED | OUTPATIENT
Start: 2022-05-15 | End: 2022-05-17 | Stop reason: HOSPADM

## 2022-05-15 RX ORDER — ACETAMINOPHEN 325 MG/1
650 TABLET ORAL
Status: DISCONTINUED | OUTPATIENT
Start: 2022-05-15 | End: 2022-05-17 | Stop reason: HOSPADM

## 2022-05-15 RX ORDER — INSULIN LISPRO 100 [IU]/ML
INJECTION, SOLUTION INTRAVENOUS; SUBCUTANEOUS
Status: DISCONTINUED | OUTPATIENT
Start: 2022-05-15 | End: 2022-05-17 | Stop reason: HOSPADM

## 2022-05-15 RX ORDER — ROSUVASTATIN CALCIUM 10 MG/1
10 TABLET, COATED ORAL
Status: DISCONTINUED | OUTPATIENT
Start: 2022-05-15 | End: 2022-05-17 | Stop reason: HOSPADM

## 2022-05-15 RX ORDER — SODIUM CHLORIDE 0.9 % (FLUSH) 0.9 %
5-10 SYRINGE (ML) INJECTION AS NEEDED
Status: DISCONTINUED | OUTPATIENT
Start: 2022-05-15 | End: 2022-05-17 | Stop reason: HOSPADM

## 2022-05-15 RX ORDER — BUDESONIDE 0.5 MG/2ML
500 INHALANT ORAL
Status: DISCONTINUED | OUTPATIENT
Start: 2022-05-15 | End: 2022-05-17 | Stop reason: HOSPADM

## 2022-05-15 RX ORDER — INSULIN GLARGINE 100 [IU]/ML
20 INJECTION, SOLUTION SUBCUTANEOUS DAILY
Status: DISCONTINUED | OUTPATIENT
Start: 2022-05-15 | End: 2022-05-16

## 2022-05-15 RX ORDER — HYDRALAZINE HYDROCHLORIDE 50 MG/1
50 TABLET, FILM COATED ORAL 3 TIMES DAILY
Status: DISCONTINUED | OUTPATIENT
Start: 2022-05-15 | End: 2022-05-17 | Stop reason: HOSPADM

## 2022-05-15 RX ORDER — ONDANSETRON 4 MG/1
4 TABLET, ORALLY DISINTEGRATING ORAL
Status: DISCONTINUED | OUTPATIENT
Start: 2022-05-15 | End: 2022-05-17 | Stop reason: HOSPADM

## 2022-05-15 RX ORDER — GUAIFENESIN 100 MG/5ML
200 SOLUTION ORAL
Status: COMPLETED | OUTPATIENT
Start: 2022-05-15 | End: 2022-05-15

## 2022-05-15 RX ORDER — HEPARIN SODIUM 5000 [USP'U]/ML
5000 INJECTION, SOLUTION INTRAVENOUS; SUBCUTANEOUS EVERY 8 HOURS
Status: DISCONTINUED | OUTPATIENT
Start: 2022-05-15 | End: 2022-05-17 | Stop reason: HOSPADM

## 2022-05-15 RX ORDER — CARVEDILOL 25 MG/1
25 TABLET ORAL 2 TIMES DAILY WITH MEALS
COMMUNITY

## 2022-05-15 RX ORDER — ASPIRIN 325 MG
325 TABLET ORAL DAILY
Status: DISCONTINUED | OUTPATIENT
Start: 2022-05-15 | End: 2022-05-17 | Stop reason: HOSPADM

## 2022-05-15 RX ORDER — GENTAMICIN SULFATE 1 MG/G
OINTMENT TOPICAL DAILY
Status: DISCONTINUED | OUTPATIENT
Start: 2022-05-15 | End: 2022-05-17 | Stop reason: SDUPTHER

## 2022-05-15 RX ORDER — ACETAMINOPHEN 650 MG/1
650 SUPPOSITORY RECTAL
Status: DISCONTINUED | OUTPATIENT
Start: 2022-05-15 | End: 2022-05-17 | Stop reason: HOSPADM

## 2022-05-15 RX ORDER — POLYETHYLENE GLYCOL 3350 17 G/17G
17 POWDER, FOR SOLUTION ORAL DAILY PRN
Status: DISCONTINUED | OUTPATIENT
Start: 2022-05-15 | End: 2022-05-17 | Stop reason: HOSPADM

## 2022-05-15 RX ORDER — GENTAMICIN SULFATE 1 MG/G
CREAM TOPICAL
Status: DISCONTINUED | OUTPATIENT
Start: 2022-05-15 | End: 2022-05-17 | Stop reason: HOSPADM

## 2022-05-15 RX ORDER — ACETAMINOPHEN 500 MG
1000 TABLET ORAL
Status: COMPLETED | OUTPATIENT
Start: 2022-05-15 | End: 2022-05-15

## 2022-05-15 RX ORDER — CARVEDILOL 12.5 MG/1
25 TABLET ORAL 2 TIMES DAILY WITH MEALS
Status: DISCONTINUED | OUTPATIENT
Start: 2022-05-15 | End: 2022-05-17 | Stop reason: HOSPADM

## 2022-05-15 RX ADMIN — HEPARIN SODIUM 5000 UNITS: 5000 INJECTION INTRAVENOUS; SUBCUTANEOUS at 21:10

## 2022-05-15 RX ADMIN — SODIUM CHLORIDE, PRESERVATIVE FREE 10 ML: 5 INJECTION INTRAVENOUS at 17:12

## 2022-05-15 RX ADMIN — SODIUM CHLORIDE 500 ML: 900 INJECTION, SOLUTION INTRAVENOUS at 03:50

## 2022-05-15 RX ADMIN — HYDRALAZINE HYDROCHLORIDE 50 MG: 50 TABLET, FILM COATED ORAL at 17:10

## 2022-05-15 RX ADMIN — HYDRALAZINE HYDROCHLORIDE 50 MG: 50 TABLET, FILM COATED ORAL at 10:36

## 2022-05-15 RX ADMIN — SODIUM CHLORIDE, SODIUM LACTATE, CALCIUM CHLORIDE, MAGNESIUM CHLORIDE AND DEXTROSE 6000 ML: 2.5; 538; 448; 18.3; 5.08 INJECTION, SOLUTION INTRAPERITONEAL at 20:09

## 2022-05-15 RX ADMIN — ASPIRIN 325 MG ORAL TABLET 325 MG: 325 PILL ORAL at 10:36

## 2022-05-15 RX ADMIN — INSULIN GLARGINE 20 UNITS: 100 INJECTION, SOLUTION SUBCUTANEOUS at 12:59

## 2022-05-15 RX ADMIN — CARVEDILOL 25 MG: 12.5 TABLET, FILM COATED ORAL at 17:09

## 2022-05-15 RX ADMIN — Medication 5 UNITS: at 22:09

## 2022-05-15 RX ADMIN — ACETAMINOPHEN 1000 MG: 500 TABLET ORAL at 04:49

## 2022-05-15 RX ADMIN — GENTAMICIN SULFATE: 1 OINTMENT TOPICAL at 20:08

## 2022-05-15 RX ADMIN — VANCOMYCIN HYDROCHLORIDE 2250 MG: 10 INJECTION, POWDER, LYOPHILIZED, FOR SOLUTION INTRAVENOUS at 04:50

## 2022-05-15 RX ADMIN — MONTELUKAST 10 MG: 10 TABLET, FILM COATED ORAL at 22:00

## 2022-05-15 RX ADMIN — GUAIFENESIN 200 MG: 200 SOLUTION ORAL at 05:43

## 2022-05-15 RX ADMIN — HEPARIN SODIUM 5000 UNITS: 5000 INJECTION INTRAVENOUS; SUBCUTANEOUS at 12:57

## 2022-05-15 RX ADMIN — Medication 15 UNITS: at 13:00

## 2022-05-15 RX ADMIN — ROSUVASTATIN CALCIUM 10 MG: 10 TABLET, FILM COATED ORAL at 22:00

## 2022-05-15 RX ADMIN — Medication 4 UNITS: at 22:07

## 2022-05-15 RX ADMIN — NIFEDIPINE 60 MG: 60 TABLET, EXTENDED RELEASE ORAL at 12:57

## 2022-05-15 RX ADMIN — ALBUTEROL SULFATE 1 DOSE: 2.5 SOLUTION RESPIRATORY (INHALATION) at 05:52

## 2022-05-15 RX ADMIN — SODIUM CHLORIDE, PRESERVATIVE FREE 2 G: 5 INJECTION INTRAVENOUS at 04:50

## 2022-05-15 RX ADMIN — METHYLPREDNISOLONE SODIUM SUCCINATE 125 MG: 125 INJECTION, POWDER, FOR SOLUTION INTRAMUSCULAR; INTRAVENOUS at 05:43

## 2022-05-15 RX ADMIN — CARVEDILOL 25 MG: 12.5 TABLET, FILM COATED ORAL at 10:36

## 2022-05-15 RX ADMIN — Medication 15 UNITS: at 17:10

## 2022-05-15 RX ADMIN — SODIUM CHLORIDE, PRESERVATIVE FREE 10 ML: 5 INJECTION INTRAVENOUS at 17:11

## 2022-05-15 NOTE — ED NOTES
Pt reminded of need for urine sample. Assisted pt to the bathroom but she stated she did not need to pee right now. Stated she would try again soon.

## 2022-05-15 NOTE — ED NOTES
Bedside shift change report given to Richard Corley RN (oncoming nurse) by Mirna Avelar RN (offgoing nurse). Report included the following information SBAR, ED Summary, MAR and Recent Results.

## 2022-05-15 NOTE — PROGRESS NOTES
[facility] for ESRD    Pt followed by RNA    Will switch consult for their group    Luke Guerrero MD  [address] [facility]

## 2022-05-15 NOTE — H&P
History & Physical    Primary Care Provider: Delta Card MD  Source of Information: Patient     Please note that this dictation was completed with [employer], the computer voice recognition software. Quite often unanticipated grammatical, syntax, homophones, and other interpretive errors are inadvertently transcribed by the computer software. Please disregard these errors. Please excuse any errors that have escaped final proofreading. History of Presenting Illness:   Mandeep Mccord is a 78 y.o. female who presents with SOB. Her past medical history significant for asthma, CHF, chronic kidney disease and peritoneal dialysis dependent, COPD, diabetes, GERD, hypertension, coronary disease, status post , MI with stent placement, hysterectomy who presents to the ER accompanied by family with a complaint of cough, general malaise and wheezing with shortness of breath, chills x2 days. The patient was at dialysis when she began feeling bad and was brought to the ER for further evaluation. She denies any abdominal pain, nausea or vomiting, headache, neck and back pain, sore throat, earaches, diarrhea, constipation, sick contact, skin rash or recent travel. Review of Systems:  Pertinent items are noted in the History of Present Illness.      Past Medical History:   Diagnosis Date    Asthma     CAD (coronary artery disease)     CHF (congestive heart failure) (HCC)     Chronic kidney disease     HD -- [facility] [phone]    COPD (chronic obstructive pulmonary disease) (HCC)     Diabetes (HCC)     DM2    GERD (gastroesophageal reflux disease)     Hypertension       Past Surgical History:   Procedure Laterality Date    DELIVERY       HX CORONARY STENT PLACEMENT      HX HYSTERECTOMY      IR INSERT NON TUNL CVC OVER 5 YRS  12/3/2021    IR INSERT TUNL CVC W/O PORT OVER 5 YR  2021    RI CARDIAC SURG PROCEDURE UNLIST      VASCULAR SURGERY PROCEDURE UNLIST      leg stent     Prior to Admission medications    Medication Sig Start Date End Date Taking? Authorizing Provider   dulaglutide (Trulicity) 2.14 DK/9.4 mL sub-q pen 0.75 mg by SubCUTAneous route every seven (7) days. Unsure of dosage    Provider, Historical   insulin glargine (LANTUS,BASAGLAR) 100 unit/mL (3 mL) inpn 20 Units by SubCUTAneous route. Provider, Historical   rosuvastatin (CRESTOR) 20 mg tablet Take 0.5 Tablets by mouth nightly. Take 1 Tab by mouth nightly. Dose reduced from 20 mg to 10 mg. 12/10/21   Flavio Lin MD   epoetin joni-epbx (RETACRIT) 20,000 unit/mL injection 1 mL by SubCUTAneous route every Monday, Wednesday, Friday. Indications: anemia due to kidney failure  Indications: anemia due to kidney failure 12/10/21   Flavio Lin MD   hydrALAZINE (APRESOLINE) 50 mg tablet Take 1 Tablet by mouth three (3) times daily. 12/10/21   Flavio Lin MD   mometasone-formoterol (DULERA) 200-5 mcg/actuation HFA inhaler Take 2 Puffs by inhalation two (2) times a day. Provider, Historical   NIFEdipine ER (PROCARDIA XL) 60 mg ER tablet Take 1 Tab by mouth daily. 7/9/18   Ketty Weston MD   insulin NPH/insulin regular (NOVOLIN 70/30 U-100 INSULIN) 100 unit/mL (70-30) injection 45 Units by SubCUTAneous route two (2) times a day. Patient not taking: Reported on 1/18/2022 7/9/18   Ketty Weston MD   metoprolol succinate (TOPROL XL) 100 mg tablet Take 100 mg by mouth daily. Provider, Historical   albuterol-ipratropium (DUO-NEB) 2.5 mg-0.5 mg/3 ml nebu 3 mL by Nebulization route every six (6) hours as needed. 3/19/18   Ketty Weston MD   montelukast (SINGULAIR) 10 mg tablet Take 1 Tab by mouth nightly. 3/19/18   Ketty Weston MD   aspirin (ASPIRIN) 325 mg tablet Take 325 mg by mouth daily.     Other, MD Tony     Allergies   Allergen Reactions    Ivp Dye [Fd And C Blue No.1] Hives      Family History   Problem Relation Age of Onset    Heart Disease Mother     Cancer Father         SOCIAL HISTORY:  Patient resides:  Independently x   Assisted Living    SNF    With family care       Smoking history:   None    Former    Chronic x     Alcohol history:   None x   Social    Chronic      Ambulates:   Independently x   w/cane    w/walker    w/wc    CODE STATUS:  DNR    Full x   Other      Objective:     Physical Exam:     Visit Vitals  BP (!) 140/73   Pulse 94   Temp 99.7 °F (37.6 °C)   Resp 18   Ht 5' 5\" (1.651 m)   Wt 94.3 kg (208 lb)   SpO2 95%   BMI 34.61 kg/m²      O2 Device: None (Room air)    General:  Alert, cooperative, no distress, appears stated age. Head:  Normocephalic, without obvious abnormality, atraumatic. Eyes:  Conjunctivae/corneas clear. PERRL, EOMs intact. Nose: Nares normal. Septum midline. Mucosa normal. No drainage or sinus tenderness. Throat: Lips, mucosa, and tongue normal. Teeth and gums normal.   Neck: Supple, symmetrical, trachea midline, no adenopathy, thyroid: no enlargement/tenderness/nodules, no carotid bruit and no JVD. Lungs:   Clear to auscultation bilaterally. Heart:  Regular rate and rhythm, S1, S2 normal, no murmur, click, rub or gallop. Abdomen:   Soft, non-tender. Bowel sounds normal. No masses,  No organomegaly. Extremities: Extremities normal, atraumatic, no cyanosis or edema. Pulses: 2+ and symmetric all extremities. Skin: Skin color, texture, turgor normal. No rashes or lesions   Neurologic: CNII-XII intact. EKG:  normal EKG,normal sinus rhythm. Data Review:     Recent Days:  Recent Labs     05/15/22  0351   WBC 8.3   HGB 8.6*   HCT 27.2*        Recent Labs     05/15/22  0351   *   K 3.4*   CL 99   CO2 26   *   BUN 34*   CREA 6.85*   CA 7.9*   ALB 2.4*   TBILI 0.5   ALT 9*     No results for input(s): PH, PCO2, PO2, HCO3, FIO2 in the last 72 hours.     24 Hour Results:  Recent Results (from the past 24 hour(s))   SAMPLES BEING HELD    Collection Time: 05/15/22  3:51 AM   Result Value Ref Range    SAMPLES BEING HELD 1BLUE     COMMENT        Add-on orders for these samples will be processed based on acceptable specimen integrity and analyte stability, which may vary by analyte. CBC WITH AUTOMATED DIFF    Collection Time: 05/15/22  3:51 AM   Result Value Ref Range    WBC 8.3 3.6 - 11.0 K/uL    RBC 2.74 (L) 3.80 - 5.20 M/uL    HGB 8.6 (L) 11.5 - 16.0 g/dL    HCT 27.2 (L) 35.0 - 47.0 %    MCV 99.3 (H) 80.0 - 99.0 FL    MCH 31.4 26.0 - 34.0 PG    MCHC 31.6 30.0 - 36.5 g/dL    RDW 15.1 (H) 11.5 - 14.5 %    PLATELET 641 380 - 142 K/uL    MPV 10.8 8.9 - 12.9 FL    NRBC 0.0 0  WBC    ABSOLUTE NRBC 0.00 0.00 - 0.01 K/uL    NEUTROPHILS 75 32 - 75 %    LYMPHOCYTES 13 12 - 49 %    MONOCYTES 8 5 - 13 %    EOSINOPHILS 2 0 - 7 %    BASOPHILS 1 0 - 1 %    IMMATURE GRANULOCYTES 1 (H) 0.0 - 0.5 %    ABS. NEUTROPHILS 6.3 1.8 - 8.0 K/UL    ABS. LYMPHOCYTES 1.1 0.8 - 3.5 K/UL    ABS. MONOCYTES 0.6 0.0 - 1.0 K/UL    ABS. EOSINOPHILS 0.2 0.0 - 0.4 K/UL    ABS. BASOPHILS 0.1 0.0 - 0.1 K/UL    ABS. IMM. GRANS. 0.1 (H) 0.00 - 0.04 K/UL    DF AUTOMATED     METABOLIC PANEL, COMPREHENSIVE    Collection Time: 05/15/22  3:51 AM   Result Value Ref Range    Sodium 134 (L) 136 - 145 mmol/L    Potassium 3.4 (L) 3.5 - 5.1 mmol/L    Chloride 99 97 - 108 mmol/L    CO2 26 21 - 32 mmol/L    Anion gap 9 5 - 15 mmol/L    Glucose 356 (H) 65 - 100 mg/dL    BUN 34 (H) 6 - 20 MG/DL    Creatinine 6.85 (H) 0.55 - 1.02 MG/DL    BUN/Creatinine ratio 5 (L) 12 - 20      GFR est AA 7 (L) >60 ml/min/1.73m2    GFR est non-AA 6 (L) >60 ml/min/1.73m2    Calcium 7.9 (L) 8.5 - 10.1 MG/DL    Bilirubin, total 0.5 0.2 - 1.0 MG/DL    ALT (SGPT) 9 (L) 12 - 78 U/L    AST (SGOT) 17 15 - 37 U/L    Alk.  phosphatase 118 (H) 45 - 117 U/L    Protein, total 8.1 6.4 - 8.2 g/dL    Albumin 2.4 (L) 3.5 - 5.0 g/dL    Globulin 5.7 (H) 2.0 - 4.0 g/dL    A-G Ratio 0.4 (L) 1.1 - 2.2     LACTIC ACID    Collection Time: 05/15/22  3:51 AM   Result Value Ref Range Lactic acid 2.1 (HH) 0.4 - 2.0 MMOL/L   C REACTIVE PROTEIN, QT    Collection Time: 05/15/22  3:51 AM   Result Value Ref Range    C-Reactive protein 13.10 (H) 0.00 - 0.60 mg/dL   PROCALCITONIN    Collection Time: 05/15/22  3:51 AM   Result Value Ref Range    Procalcitonin 0.94 ng/mL   EKG, 12 LEAD, INITIAL    Collection Time: 05/15/22  4:03 AM   Result Value Ref Range    Ventricular Rate 110 BPM    Atrial Rate 110 BPM    P-R Interval 148 ms    QRS Duration 86 ms    Q-T Interval 380 ms    QTC Calculation (Bezet) 514 ms    Calculated R Axis -144 degrees    Calculated T Axis 60 degrees    Diagnosis       Sinus tachycardia  Right superior axis deviation  Abnormal ECG  When compared with ECG of 06-DEC-2021 14:34,  MANUAL COMPARISON REQUIRED, DATA IS UNCONFIRMED     INFLUENZA A+B VIRAL AGS    Collection Time: 05/15/22  4:56 AM   Result Value Ref Range    Influenza A Antigen Negative NEG      Influenza B Antigen Negative NEG     COVID-19 RAPID TEST    Collection Time: 05/15/22  4:57 AM   Result Value Ref Range    Specimen source Nasopharyngeal      COVID-19 rapid test Not detected NOTD           Imaging:     XR CHEST PORT    Result Date: 5/15/2022  No evidence of acute cardiopulmonary process. Admit to inpatient status      Patient was explained about the risk of admission including and not a complete list including risk of falls,fractures,blood clots,allergic reactions,infections. Patient/family also understands and agrees to the treatment plan including medications and side effect profiles and also understand the risk with radiation while undergoing imaging studies. The patient and the family/friends (after permission given by the patient to discuss) understand this and agree with the admission plan.         Assessment:     Active Problems:    SOB (shortness of breath) (5/15/2022)           Plan:     Shortness of breath:   --this is likely due to pulmonary edema / volume overload in the setting of CKD and distolic CHF   VS resp virus+/- FLU    she is not hypoxic  Supplement oxygen get ABG   - check viraL; RT-PCR initial SARS COVID neg   - pro vicky lactate low given a dose of IV vanc at ED  - CRP elevated PCR COVID positive need Actemra check D dimer      ESRD :  -she is hypervolemic  Need additional HD   -nephrology consulted      Anemia: no evidence of bleeding.   - Stool occult blood.  Anemia of chronic dz     H/o CAD   - cycle trop, ekg normal cont statin asa     Asymmetric LE edema: pt states this is chronic     Type 2 DM:  -SSI/POC checks on Long acting      HTN:  -continue coreg , nifedipine     AsthmaCOPD: nebs PRN    Full Code DVT SCD       Signed By: Jeromy Al MD     May 15, 2022

## 2022-05-15 NOTE — PROGRESS NOTES
Nephrology Progress Note  [address] [facility]     [email or web address]. [email or web address]  Phone - [phone]   Patient: Angie Meng    YOB: 1955        Date- 5/15/2022   Admit Date: 5/15/2022  CC: Follow up for  ESRD         IMPRESSION & PLAN:    ESRD, [facility] under Dr. Carlos Patel, [facility].   Shortness of breath   COPD exacerbation   Type 2 diabetes   GERD   Hypertension   CAD   Anemia of CKD    PLAN-   [facility] team has been notified.  We will plan to initiate CCPD tonight by using 2.5% dianeal for total of 5 exchanges.  No plans for any manual exchange for now, and no plan for last fill.  Thank you the consult follow with     Subjective: Interval History:   -She presented to the ED with complaints of ongoing shortness of breath. She was unable to finish peritoneal dialysis treatment last night. She history of COPD and has been complaining of wheezing. She is ESRD and goes to [facility] under Dr. Carlos Patel. Objective:   Vitals:    05/15/22 1225 05/15/22 1225 05/15/22 1257 05/15/22 1400   BP: 111/75  118/83    Pulse: 80 81 78 86   Resp: 18      Temp:       SpO2: 97%      Weight:       Height:          05/14 0701 - 05/15 0700  In: 1000 [I.V.:1000]  Out: -   Last 3 Recorded Weights in this Encounter    05/15/22 0323   Weight: 94.3 kg (208 lb)      Physical exam:    GEN: NAD  NECK- Supple, no mass  RESP: Bilateral wheezing  CVS: S1,S2  RRR  NEURO: Normal speech, Non focal  EXT: No Edema   PD Access: PD catheter site looks clean. Chart reviewed. Pertinent Notes reviewed. Data Review :  Recent Labs     05/15/22  0351   *   K 3.4*   CL 99   CO2 26   BUN 34*   CREA 6.85*   *   CA 7.9*     Recent Labs     05/15/22  0351   WBC 8.3   HGB 8.6*   HCT 27.2*        No results for input(s): FE, TIBC, PSAT, FERR in the last 72 hours.    Medication list  reviewed  Current Facility-Administered Medications   Medication Dose Route Frequency    sodium chloride (NS) flush 5-40 mL  5-40 mL IntraVENous Q8H    sodium chloride (NS) flush 5-40 mL  5-40 mL IntraVENous PRN    sodium chloride (NS) flush 5-10 mL  5-10 mL IntraVENous PRN    sodium chloride (NS) flush 5-40 mL  5-40 mL IntraVENous Q8H    sodium chloride (NS) flush 5-40 mL  5-40 mL IntraVENous PRN    acetaminophen (TYLENOL) tablet 650 mg  650 mg Oral Q6H PRN    Or    acetaminophen (TYLENOL) suppository 650 mg  650 mg Rectal Q6H PRN    polyethylene glycol (MIRALAX) packet 17 g  17 g Oral DAILY PRN    ondansetron (ZOFRAN ODT) tablet 4 mg  4 mg Oral Q8H PRN    Or    ondansetron (ZOFRAN) injection 4 mg  4 mg IntraVENous Q6H PRN    heparin (porcine) injection 5,000 Units  5,000 Units SubCUTAneous Q8H    albuterol-ipratropium (DUO-NEB) 2.5 MG-0.5 MG/3 ML  3 mL Nebulization Q6H PRN    budesonide (PULMICORT) 500 mcg/2 ml nebulizer suspension  500 mcg Nebulization BID RT    aspirin tablet 325 mg  325 mg Oral DAILY    hydrALAZINE (APRESOLINE) tablet 50 mg  50 mg Oral TID    insulin glargine (LANTUS) injection 20 Units  20 Units SubCUTAneous DAILY    montelukast (SINGULAIR) tablet 10 mg  10 mg Oral QHS    NIFEdipine ER (PROCARDIA XL) tablet 60 mg  60 mg Oral DAILY    rosuvastatin (CRESTOR) tablet 10 mg  10 mg Oral QHS    insulin lispro (HUMALOG) injection   SubCUTAneous AC&HS    glucose chewable tablet 16 g  4 Tablet Oral PRN    glucagon (GLUCAGEN) injection 1 mg  1 mg IntraMUSCular PRN    dextrose 10 % infusion 0-250 mL  0-250 mL IntraVENous PRN    carvediloL (COREG) tablet 25 mg  25 mg Oral BID WITH MEALS    peritoneal dialysis DEXTROSE 2.5% (2.5 mEq/L low calcium) solution 6,000 mL  6,000 mL IntraPERitoneal DAILY    peritoneal dialysis DEXTROSE 2.5% (2.5 mEq/L low calcium) solution 6,000 mL  6,000 mL IntraPERitoneal DAILY          Brian Yip MD              [facility]  [facility] / [place] [address], [address]  [place], [address]  Phone - [phone] Fax - [phone]

## 2022-05-15 NOTE — ED TRIAGE NOTES
Emergency Room Nursing Note        Patient Name: John Castro      : 1955             MRN: [id number]      Chief Complaint:  Wheezing      Admit Diagnosis: No admission diagnoses are documented for this encounter. Admitting Provider: No admitting provider for patient encounter. Surgery: * No surgery found *           Patient arrived to the ER ambulatory from home with complaints of SOB & Wheezing. Pt discloses a Hx of stage 5 Renal Disease, DM, Extensive Cardiac Hx 2 heart attacks, PD patient, Asthma, COPD and HTN. Pt does not use O2 at home.          Lines:        Signed by: Pavel Sahni RN, RANGEL, BSN, [facility]                                              5/15/2022 at 3:25 AM

## 2022-05-15 NOTE — ED NOTES
TRANSFER - OUT REPORT:    Verbal report given to [address] Ang[address] Rojas RN(name) on Jacki Romero  being transferred to [facility](unit) for routine progression of care       Report consisted of patients Situation, Background, Assessment and   Recommendations(SBAR). Information from the following report(s) SBAR, ED Summary, [facility] and Recent Results was reviewed with the receiving nurse. Lines:   Peripheral IV 05/15/22 Right Antecubital (Active)        Opportunity for questions and clarification was provided.       Patient transported with:   [place] [facility]

## 2022-05-15 NOTE — PROGRESS NOTES
1247: Dr. Kye Ba was notified that patient sugar was 537. 15 units ordered and given. Was also informed of patient complaining that her left lower leg felt weird and like she had no control over it. She states that it feels very cold. Leg and foot is warm to touch and has palpable pulses. No complaints of pain in the leg. No new orders at this time just continue to monitor. 1531: Dr. Kye Ba was notified that patients sugar was 467. 15 units ordered and given.

## 2022-05-15 NOTE — PROGRESS NOTES
Occupational Therapy Screening:  Services maybe indicated at this time. [facility] based on results obtained during the nursing admission assessment. The patients chart was reviewed . Please order a consult for occupational therapy if patient has had a decline in function from baseline and you would like an evaluation to be completed. Thank you.

## 2022-05-15 NOTE — ED PROVIDER NOTES
56-year-old female with a past medical history significant for asthma, CHF, chronic kidney disease and peritoneal dialysis dependent, COPD, diabetes, GERD, hypertension, coronary disease, status post , MI with stent placement, hysterectomy who presents to the ER accompanied by family with a complaint of cough, general malaise and wheezing with shortness of breath, chills x2 days. The patient was at dialysis when she began feeling bad and was brought to the ER for further evaluation. She denies any abdominal pain, nausea or vomiting, headache, neck and back pain, sore throat, earaches, diarrhea, constipation, sick contact, skin rash or recent travel.            Past Medical History:   Diagnosis Date    Asthma     CAD (coronary artery disease)     CHF (congestive heart failure) (HCC)     Chronic kidney disease     HD - Radu Menon [phone]    [facility]) [facility])     Diabetes (HCC)     DM2    GERD (gastroesophageal reflux disease)     Hypertension        Past Surgical History:   Procedure Laterality Date    DELIVERY       HX CORONARY STENT PLACEMENT      HX HYSTERECTOMY      IR INSERT NON TUNL CVC OVER 5 YRS  12/3/2021    IR INSERT TUNL CVC W/O PORT OVER 5 YR  2021    [place] CARDIAC SURG PROCEDURE UNLIST      VASCULAR SURGERY PROCEDURE UNLIST      leg stent         Family History:   Problem Relation Age of Onset    Heart Disease Mother     Cancer Father        Social History     Socioeconomic History    Marital status: SINGLE     Spouse name: Not on file    Number of children: Not on file    Years of education: Not on file    Highest education level: Not on file   Occupational History    Not on file   Tobacco Use    Smoking status: Former Smoker     Packs/day: 0.25     Years: 10.00     Pack years: 2.50     Quit date: 2021     Years since quittin.5    Smokeless tobacco: Never Used   Vaping Use    Vaping Use: Never used Substance and Sexual Activity    Alcohol use: No    Drug use: Never    Sexual activity: Not on file   Other Topics Concern    Not on file   Social History Narrative    Not on file     Social Determinants of Health     Financial Resource Strain:     Difficulty of Paying Living Expenses: Not on file   Food Insecurity:     Worried About Running Out of Food in the Last Year: Not on file    Dieter of Food in the Last Year: Not on file   Transportation Needs:     Lack of Transportation (Medical): Not on file    Lack of Transportation (Non-Medical): Not on file   Physical Activity:     Days of Exercise per Week: Not on file    Minutes of Exercise per Session: Not on file   Stress:     Feeling of Stress : Not on file   Social Connections:     Frequency of Communication with Friends and Family: Not on file    Frequency of Social Gatherings with Friends and Family: Not on file    Attends [employer] Services: Not on file    Active Member of [address] [place] [address] or Organizations: Not on file    Attends Club or Organization Meetings: Not on file    Marital Status: Not on file   Intimate Partner Violence:     Fear of Current or Ex-Partner: Not on file    Emotionally Abused: Not on file    Physically Abused: Not on file    Sexually Abused: Not on file   Housing Stability:     Unable to Pay for Housing in the Last Year: Not on file    Number of [place] in the Last Year: Not on file    Unstable Housing in the Last Year: Not on file         ALLERGIES: Ivp dye [fd and c blue no. 1]    Review of Systems   All other systems reviewed and are negative. Vitals:    05/15/22 0323   BP: 137/73   Pulse: (!) 114   Resp: 20   Temp: (!) 102.1 °F (38.9 °C)   SpO2: 97%   Weight: 94.3 kg (208 lb)   Height: 5' 5\" (1.651 m)            Physical Exam  Vitals and nursing note reviewed. Exam conducted with a chaperone present.        CONSTITUTIONAL: Well-appearing; well-nourished; in mild  distress  HEAD: Normocephalic; atraumatic  EYES: PERRL; EOM intact; conjunctiva and sclera are clear bilaterally. ENT: No rhinorrhea; normal pharynx with no tonsillar hypertrophy; mucous membranes pink/moist, no erythema, no exudate. NECK: Supple; non-tender; no cervical lymphadenopathy  CARD: Normal S1, S2; no murmurs, rubs, or gallops. Regular rate and rhythm. RESP: Normal respiratory effort; coarse breath sound with bibasilar crackles, no rhonchi  ABD: Normal bowel sounds; non-distended; non-tender; no palpable organomegaly, no masses, no bruits. Back Exam: Normal inspection; no vertebral point tenderness, no CVA tenderness. Normal range of motion. EXT: Normal ROM in all four extremities; non-tender to palpation; no swelling or deformity; distal pulses are normal, no edema. SKIN: Warm; dry; no rash. NEURO:Alert and oriented x 3, coherent, KRYSTYNA-XII grossly intact, sensory and motor are non-focal.        MDM  Number of Diagnoses or Management Options  Acute exacerbation of chronic obstructive pulmonary disease (COPD) ([facility] [address]  Acute febrile illness  History of peritoneal dialysis  Severe sepsis ([facility] [address]  Suspected COVID-19 virus infection  Diagnosis management comments: Assessment: 48-year-old female with fever, general malaise, URI symptoms congestion and wheezing and history of diabetes and peritoneal dialysis. The patient will need evaluation for sepsis with occult bacteremia including COVID-19 and influenza. She appears hemodynamically stable admit To the ER for sepsis at this time. Plan: Lab/IV fluid/EKG/chest x-ray/broad-spectrum antibiotic/education, reassurance, symptomatic treatment/serial exam/ Monitor and Reevaluate.          Amount and/or Complexity of Data Reviewed  Clinical lab tests: ordered and reviewed  Tests in the radiology section of CPT®: ordered and reviewed  Tests in the medicine section of CPT®: reviewed and ordered  Discussion of test results with the performing providers: yes  Decide to obtain previous medical records or to obtain history from someone other than the patient: yes  Obtain history from someone other than the patient: yes  Review and summarize past medical records: yes  Discuss the patient with other providers: yes  Independent visualization of images, tracings, or specimens: yes    Risk of Complications, Morbidity, and/or Mortality  Presenting problems: moderate  Diagnostic procedures: moderate  Management options: moderate    Patient Progress  Patient progress: stable  13-year-old female who presents to this ER for evaluation for       Procedures    ED EKG interpretation:  Rhythm: sinus tachycardia; and regular . Rate (approx.): 110; Axis: left axis deviation; P wave: normal; QRS interval: normal ; ST/T wave: non-specific changes; in  Lead: Diffusely; Other findings: abnormal ekg. This EKG was interpreted by Ivy Angelucci, MD,ED Provider. XRAY INTERPRETATION (ED MD)  Chest Xray  No acute process seen. Normal heart size. No bony abnormalities. No infiltrate. Remigio Mackay MD 3:46 AM    PROGRESS NOTE:  Pt has been reexamined by Remigio Mackay MD all available results have been reviewed with pt and any available family. Pt understands sx, dx, and tx in ED. Care plan has been outlined and questions have been answered. Pt and any available family understands and agrees to need for admission to hospital for further tx not available in ED. Pt is ready for admission. Written by Ivy Angelucci, MD,  3:46 AM    PROGRESS NOTE:  Pt has been reexamined by Remigio Mackay MD all available results have been reviewed with pt and any available family. Pt understands sx, dx, and tx in ED. Care plan has been outlined and questions have been answered. Pt and any available family understands and agrees to need for admission to hospital for further tx not available in ED. Pt is ready for admission.     Written by Ivy Angelucci, MD,  7:08 AM    CONSULT NOTE:  Remigio Mackay MD spoke with Dr. Gabo Shaw of the adult hospitalist team discussed patient's presentation, history, physical assessment, and available diagnostic results. Will come and see the patient in the ED. Perfect Serve Consult for Admission  7:08 AM    ED Room Number: ER09/09  Patient Name and age:  Karey Tom 78 y.o.  female  Working Diagnosis:   1. Acute febrile illness    2. Severe sepsis ([facility] [address]    3. Acute exacerbation of chronic obstructive pulmonary disease (COPD) ([facility] [address]    4. Suspected COVID-19 virus infection    5. History of peritoneal dialysis        COVID-19 Suspicion:  yes  Sepsis present:  yes  Reassessment needed: no  Code Status:  Full Code   Readmission: no  Isolation Requirements:  yes  Recommended Level of Care:  telemetry  Department:  [facility] Adult ED - [phone]  Admitting Provider: Dr. Rosamaria Valle  Other: Total critical care time spent exclusive of procedures:  45 minutes. Code Sepsis Reassessment & Plan    - Sepsis order set entered: YES  - Broad Spectrum Antibiotics given: Cefepime and Vancomycin  - Repeat lactic acid ordered for time now  - Re-assessment performed at time now   and clinical condition stable. - Actions taken: none. - Hypotension or Lactic Acidosis present (SBP<90, MAP<65, Lactate >4): NO   - IVF: Limited IVF given (500 ml) because patient has ESRD with GFR = 7  - Persistent Septic Shock present (Hypotension despite IVF resuscitation): NO  Vasopressors: Not indicated due to septic shock not present  - Disposition: Admit to Telemetry     Melly Canas MD 07:19 AM            .     .

## 2022-05-16 LAB
ANION GAP SERPL CALC-SCNC: 11 MMOL/L (ref 5–15)
BASOPHILS # BLD: 0 K/UL (ref 0–0.1)
BASOPHILS NFR BLD: 0 % (ref 0–1)
BUN SERPL-MCNC: 48 MG/DL (ref 6–20)
BUN/CREAT SERPL: 6 (ref 12–20)
CALCIUM SERPL-MCNC: 7.4 MG/DL (ref 8.5–10.1)
CHLORIDE SERPL-SCNC: 97 MMOL/L (ref 97–108)
CO2 SERPL-SCNC: 24 MMOL/L (ref 21–32)
CREAT SERPL-MCNC: 8.03 MG/DL (ref 0.55–1.02)
DIFFERENTIAL METHOD BLD: ABNORMAL
EOSINOPHIL # BLD: 0 K/UL (ref 0–0.4)
EOSINOPHIL NFR BLD: 0 % (ref 0–7)
ERYTHROCYTE [DISTWIDTH] IN BLOOD BY AUTOMATED COUNT: 14.9 % (ref 11.5–14.5)
GLUCOSE BLD STRIP.AUTO-MCNC: 321 MG/DL (ref 65–117)
GLUCOSE BLD STRIP.AUTO-MCNC: 324 MG/DL (ref 65–117)
GLUCOSE BLD STRIP.AUTO-MCNC: 386 MG/DL (ref 65–117)
GLUCOSE BLD STRIP.AUTO-MCNC: 389 MG/DL (ref 65–117)
GLUCOSE BLD STRIP.AUTO-MCNC: 397 MG/DL (ref 65–117)
GLUCOSE SERPL-MCNC: 383 MG/DL (ref 65–100)
HCT VFR BLD AUTO: 23.8 % (ref 35–47)
HGB BLD-MCNC: 7.6 G/DL (ref 11.5–16)
IMM GRANULOCYTES # BLD AUTO: 0.1 K/UL (ref 0–0.04)
IMM GRANULOCYTES NFR BLD AUTO: 1 % (ref 0–0.5)
LYMPHOCYTES # BLD: 1.1 K/UL (ref 0.8–3.5)
LYMPHOCYTES NFR BLD: 12 % (ref 12–49)
MCH RBC QN AUTO: 30.8 PG (ref 26–34)
MCHC RBC AUTO-ENTMCNC: 31.9 G/DL (ref 30–36.5)
MCV RBC AUTO: 96.4 FL (ref 80–99)
MONOCYTES # BLD: 0.7 K/UL (ref 0–1)
MONOCYTES NFR BLD: 8 % (ref 5–13)
NEUTS SEG # BLD: 6.8 K/UL (ref 1.8–8)
NEUTS SEG NFR BLD: 79 % (ref 32–75)
NRBC # BLD: 0 K/UL (ref 0–0.01)
NRBC BLD-RTO: 0 PER 100 WBC
PLATELET # BLD AUTO: 282 K/UL (ref 150–400)
PMV BLD AUTO: 10.3 FL (ref 8.9–12.9)
POTASSIUM SERPL-SCNC: 3.6 MMOL/L (ref 3.5–5.1)
RBC # BLD AUTO: 2.47 M/UL (ref 3.8–5.2)
SERVICE CMNT-IMP: ABNORMAL
SODIUM SERPL-SCNC: 132 MMOL/L (ref 136–145)
WBC # BLD AUTO: 8.6 K/UL (ref 3.6–11)

## 2022-05-16 PROCEDURE — 97161 PT EVAL LOW COMPLEX 20 MIN: CPT

## 2022-05-16 PROCEDURE — 82962 GLUCOSE BLOOD TEST: CPT

## 2022-05-16 PROCEDURE — A4726 DIALYS SOL FLD VOL > 5999CC: HCPCS | Performed by: INTERNAL MEDICINE

## 2022-05-16 PROCEDURE — 94664 DEMO&/EVAL PT USE INHALER: CPT

## 2022-05-16 PROCEDURE — 74011636637 HC RX REV CODE- 636/637: Performed by: HOSPITALIST

## 2022-05-16 PROCEDURE — 94640 AIRWAY INHALATION TREATMENT: CPT

## 2022-05-16 PROCEDURE — 74011250637 HC RX REV CODE- 250/637: Performed by: HOSPITALIST

## 2022-05-16 PROCEDURE — 96372 THER/PROPH/DIAG INJ SC/IM: CPT

## 2022-05-16 PROCEDURE — 77010033678 HC OXYGEN DAILY

## 2022-05-16 PROCEDURE — 85025 COMPLETE CBC W/AUTO DIFF WBC: CPT

## 2022-05-16 PROCEDURE — 65660000001 HC RM ICU INTERMED STEPDOWN

## 2022-05-16 PROCEDURE — 74011000250 HC RX REV CODE- 250: Performed by: HOSPITALIST

## 2022-05-16 PROCEDURE — 36415 COLL VENOUS BLD VENIPUNCTURE: CPT

## 2022-05-16 PROCEDURE — 90945 DIALYSIS ONE EVALUATION: CPT

## 2022-05-16 PROCEDURE — 97116 GAIT TRAINING THERAPY: CPT

## 2022-05-16 PROCEDURE — 74011250636 HC RX REV CODE- 250/636: Performed by: INTERNAL MEDICINE

## 2022-05-16 PROCEDURE — 80048 BASIC METABOLIC PNL TOTAL CA: CPT

## 2022-05-16 PROCEDURE — 74011250636 HC RX REV CODE- 250/636: Performed by: HOSPITALIST

## 2022-05-16 RX ORDER — INSULIN LISPRO 100 [IU]/ML
8 INJECTION, SOLUTION INTRAVENOUS; SUBCUTANEOUS
Status: DISCONTINUED | OUTPATIENT
Start: 2022-05-16 | End: 2022-05-17

## 2022-05-16 RX ADMIN — GENTAMICIN SULFATE: 1 OINTMENT TOPICAL at 09:55

## 2022-05-16 RX ADMIN — EPOETIN ALFA-EPBX 12000 UNITS: 10000 INJECTION, SOLUTION INTRAVENOUS; SUBCUTANEOUS at 22:32

## 2022-05-16 RX ADMIN — SODIUM CHLORIDE, PRESERVATIVE FREE 10 ML: 5 INJECTION INTRAVENOUS at 06:47

## 2022-05-16 RX ADMIN — Medication 20 UNITS: at 18:56

## 2022-05-16 RX ADMIN — SODIUM CHLORIDE, PRESERVATIVE FREE 10 ML: 5 INJECTION INTRAVENOUS at 22:00

## 2022-05-16 RX ADMIN — Medication 10 UNITS: at 18:25

## 2022-05-16 RX ADMIN — SODIUM CHLORIDE, SODIUM LACTATE, CALCIUM CHLORIDE, MAGNESIUM CHLORIDE AND DEXTROSE 6000 ML: 2.5; 538; 448; 18.3; 5.08 INJECTION, SOLUTION INTRAPERITONEAL at 16:40

## 2022-05-16 RX ADMIN — INSULIN GLARGINE 20 UNITS: 100 INJECTION, SOLUTION SUBCUTANEOUS at 09:54

## 2022-05-16 RX ADMIN — ACETAMINOPHEN 650 MG: 325 TABLET ORAL at 22:24

## 2022-05-16 RX ADMIN — MONTELUKAST 10 MG: 10 TABLET, FILM COATED ORAL at 22:24

## 2022-05-16 RX ADMIN — ASPIRIN 325 MG ORAL TABLET 325 MG: 325 PILL ORAL at 09:54

## 2022-05-16 RX ADMIN — SODIUM CHLORIDE, PRESERVATIVE FREE 10 ML: 5 INJECTION INTRAVENOUS at 06:46

## 2022-05-16 RX ADMIN — CARVEDILOL 25 MG: 12.5 TABLET, FILM COATED ORAL at 11:54

## 2022-05-16 RX ADMIN — SODIUM CHLORIDE, PRESERVATIVE FREE 10 ML: 5 INJECTION INTRAVENOUS at 13:09

## 2022-05-16 RX ADMIN — HEPARIN SODIUM 5000 UNITS: 5000 INJECTION INTRAVENOUS; SUBCUTANEOUS at 22:32

## 2022-05-16 RX ADMIN — ROSUVASTATIN CALCIUM 10 MG: 10 TABLET, FILM COATED ORAL at 22:24

## 2022-05-16 RX ADMIN — Medication 8 UNITS: at 13:09

## 2022-05-16 RX ADMIN — CARVEDILOL 25 MG: 12.5 TABLET, FILM COATED ORAL at 18:26

## 2022-05-16 RX ADMIN — Medication 6 UNITS: at 09:54

## 2022-05-16 RX ADMIN — Medication 7 UNITS: at 13:07

## 2022-05-16 RX ADMIN — Medication 10 UNITS: at 13:09

## 2022-05-16 RX ADMIN — HEPARIN SODIUM 5000 UNITS: 5000 INJECTION INTRAVENOUS; SUBCUTANEOUS at 04:15

## 2022-05-16 RX ADMIN — HEPARIN SODIUM 5000 UNITS: 5000 INJECTION INTRAVENOUS; SUBCUTANEOUS at 11:55

## 2022-05-16 RX ADMIN — BUDESONIDE 500 MCG: 0.5 INHALANT RESPIRATORY (INHALATION) at 22:52

## 2022-05-16 RX ADMIN — Medication 4 UNITS: at 22:24

## 2022-05-16 RX ADMIN — GENTAMICIN SULFATE: 1 OINTMENT TOPICAL at 16:41

## 2022-05-16 NOTE — PROGRESS NOTES
Problem: Mobility Impaired (Adult and Pediatric)  Goal: *Acute Goals and Plan of Care (Insert Text)  Description: FUNCTIONAL STATUS PRIOR TO ADMISSION: Patient was independent and active without use of DME. Patient on PD at home. Typically independent for ADLs. 1 fall PTA. HOME SUPPORT PRIOR TO ADMISSION: The patient lived with her  and family but did not require assist.    Physical Therapy Goals  Initiated 5/16/2022  1. Patient will move from supine to sit and sit to supine  in bed with modified independence within 7 day(s). 2.  Patient will transfer from bed to chair and chair to bed with modified independence using the least restrictive device within 7 day(s). 3.  Patient will perform sit to stand with modified independence within 7 day(s). 4.  Patient will ambulate with modified independence for 150 feet with the least restrictive device within 7 day(s). 5.  Patient will ascend/descend 3 stairs with 1 handrail(s) with modified independence within 7 day(s). Outcome: Progressing Towards Goal   PHYSICAL THERAPY EVALUATION  Patient: Mayra Pierson (27 y.o. female)  Date: 5/16/2022  Primary Diagnosis: SOB (shortness of breath) [R06.02]        Precautions:        ASSESSMENT  Based on the objective data described below, the patient presents with generally decreased strength, balance, sensation, and c/o left LE coordination following admission for SOB from \"parainfluenza\". She reports the onset of left LE lack of coordination 5/15/2022 but a related fall last week? BLE strength and sensation appear equal bilaterally with a mild decrease in coordination in the L > R LE. Noted increased A/P trunk sway in static stance and utilized a RW to ambulate 12' requiring minimal assistance in her room. Mild gait instability but no overt LOB. Maintained on 2L O2 throughout with no change in HR or SpO2. Will follow acutely to progress mobility. Depending on progress, [facility].     Current Level of Function Impacting Discharge (mobility/balance): min assist for transfers and gait         Patient will benefit from skilled therapy intervention to address the above noted impairments. PLAN :  Recommendations and Planned Interventions: bed mobility training, transfer training, gait training, therapeutic exercises and therapeutic activities      Frequency/Duration: Patient will be followed by physical therapy:  5 times a week to address goals.     Recommendation for discharge: (in order for the patient to meet his/her long term goals)  Physical therapy at least 2 days/week in the home       IF patient discharges home will need the following DME: to be determined (TBD)         SUBJECTIVE:   Patient stated I would like one of those walkers that you sit on    OBJECTIVE DATA SUMMARY:   HISTORY:    Past Medical History:   Diagnosis Date    Asthma     CAD (coronary artery disease)     [facility] failure) ([facility] [address]     Chronic kidney disease     HD  Radu Menon [phone]    COPD (chronic obstructive pulmonary disease) ([facility] [address]     Diabetes ([facility] [address]     DM2    GERD (gastroesophageal reflux disease)     Hypertension      Past Surgical History:   Procedure Laterality Date    DELIVERY       HX CORONARY STENT PLACEMENT      HX HYSTERECTOMY      IR INSERT NON TUNL CVC OVER 5 YRS  12/3/2021    IR INSERT TUNL CVC W/O PORT OVER 5 YR  2021    [place] CARDIAC SURG PROCEDURE UNLIST      VASCULAR SURGERY PROCEDURE UNLIST      leg stent       Personal factors and/or comorbidities impacting plan of care:     Home Situation  Home Environment: Private residence  # Steps to Enter: 3  Rails to Enter: Yes  Hand Rails : Bilateral  One/Two Story Residence: One story  Living Alone: No  Support Systems: Spouse/Significant Other,Other Family Member(s)  Patient Expects to be Discharged to[de-identified] Home with family assistance  Current DME Used/Available at Home: Esau Mims straight    EXAMINATION/PRESENTATION/DECISION MAKING:   Critical Behavior:  Neurologic State: Alert  Orientation Level: Oriented X4        Hearing: Auditory  Auditory Impairment: None  Skin:    Edema:   Range Of Motion:  AROM: Generally decreased, functional                       Strength:    Strength: Generally decreased, functional                    Tone & Sensation:   Tone: Normal              Sensation: Impaired (reports BLE numbness shin to foot)               Coordination:  Coordination: Generally decreased, functional (mild impairment L > R LE, inaccurate shin rub)  Vision:      Functional Mobility:  Bed Mobility:     Supine to Sit: Minimum assistance; Additional time     Scooting: Stand-by assistance  Transfers:  Sit to Stand: Supervision  Stand to Sit: Contact guard assistance                       Balance:   Sitting: Intact  Standing: Impaired  Standing - Static: Fair (increased A/P sway, high guard)  Standing - Dynamic : Fair  Ambulation/Gait Training:  Distance (ft): 12 Feet (ft)  Assistive Device: Gait belt;Walker, rolling  Ambulation - Level of Assistance: Contact guard assistance     Gait Description (WDL): Exceptions to WDL  Gait Abnormalities: Decreased step clearance;Shuffling gait        Base of Support: Widened     Speed/Maryann: Shuffled  Step Length: Right shortened                     Stairs:               Therapeutic Exercises:       Functional Measure:         Physical Therapy Evaluation Charge Determination   History Examination Presentation Decision-Making   MEDIUM  Complexity : 1-2 comorbidities / personal factors will impact the outcome/ POC  LOW Complexity : 1-2 Standardized tests and measures addressing body structure, function, activity limitation and / or participation in recreation  LOW Complexity : Stable, uncomplicated  LOW Complexity : FOTO score of       Based on the above components, the patient evaluation is determined to be of the following complexity level: LOW Pain Rating:      Activity Tolerance:   Fair    After treatment patient left in no apparent distress:   Sitting in chair and Call bell within reach    COMMUNICATION/EDUCATION:   The patients plan of care was discussed with: Registered nurse. Fall prevention education was provided and the patient/caregiver indicated understanding., Patient/family have participated as able in goal setting and plan of care. and Patient/family agree to work toward stated goals and plan of care.     Thank you for this referral.  Sylvia Levy, PT, DPT   Time Calculation: 29 mins

## 2022-05-16 NOTE — DIALYSIS
Peritoneal Dialysis Disconnection / [phone]     Metrics   I-Drain: 2262   Total UF: 865   Last Fill: 0   Last Manual Drain: 0   Net UF:         3,127mL   Avg Dwell Time: 1:08   Lost Dwell Time: 0:12   Alarms: na   Effluent: Clear/yellow     Access   Type & Location:    Comments: Prior to disconnection, one-minute [place] scrub/soak performed. Sterile mini cap applied and secured to abdomen. Dsg clean, dry and intact. Dsg date:5/15/22                                         Safety:   Primary Nurse Rpt Pre: J. Mortimer Hand, RN   Primary Nurse Rpt Post: DIANE Payan RN     Comments:   Old cassette discarded in red biohazard bag

## 2022-05-16 NOTE — PROGRESS NOTES
Transition of Care Plan  RUR 17%    Disposition  Home    Transportation  Family    ESRD-- Peritoneal dialysis   Home Unit  [facility] [phone]  [address]  Will arrange if ordered--therapy recommending home health PT  [facility], [address], [facility] and [facility] accepts  patient's insurance ([address] [facility] Medicare [facility])   Patient in agreement with [facility]    Medical follow up  PCP and specialist    Contact    Daughter  Matt Lott  [phone]    Reason for Admission:  SOB    Hx ESRD,--CCPD at home,  CAD, CHF COPD diabetes Hypertension and GERD             RUR Score:     17%                Plan for utilizing home health: Will arrange if ordered --- patient agrees      PCP: First and Last name:  Ariela Lane MD  [phone]   Name of Practice: [address] [facility]   Are you a current patient: Yes/No: yes   Approximate date of last visit: 2/22/22   Can you participate in a virtual visit with your PCP:                     Current Advanced Directive/Advance Care Plan: Full Code      Healthcare Decision Maker:   Click here to complete [place] including selection of the Healthcare Decision Maker Relationship (ie \"Primary\")                           Transition of Care Plan:     Home with family assistance and medical follow up   Will arrange home if ordered. Cm met with patient in her room to introduce self and explain role. Patient was alert and oriented-- Confirmed demographics PCP and insurance. Patient secures medications at [facility]. Hx HH   Yes but does not know the name of the agency  Hx Rehab/SNF  None  Dme    Rollator as needed    Patient lives in one level home alone.   She was self care and independent with adl's and iadl's prior to admission  Patient has good family support from her son, Shannon Burns who stay with her frequently and daughter, Corina Parks who transports her to appointments and daily life activities-- She assists patient with PD at home. Patient plans to return home when discharged    Patient and CM discussed transition of care planning. She is in agreement with [facility] if ordered-- has no preference of agency providing the agency is in net work with her insurance. Family will transport home when discharged. Medicare pt has received, reviewed, and signed 1st IM letter informing them of their right to appeal the discharge. Signed copy has been placed on pt bedside chart. Care Management Interventions  PCP Verified by CM: Yes  Mode of Transport at Discharge: Other (see comment) (family)  Transition of Care Consult (CM Consult):  Other  Discharge Durable Medical Equipment: No  Physical Therapy Consult: Yes  Occupational Therapy Consult: Yes  Support Systems: Child(mansoor),Other Family Member(s)  Confirm Follow Up Transport: Family  The Plan for Transition of Care is Related to the Following Treatment Goals : HH  The Patient and/or Patient Representative was Provided with a Choice of Provider and Agrees with the Discharge Plan?: Yes  Freedom of Choice List was Provided with Basic Dialogue that Supports the Patient's Individualized Plan of Care/Goals, Treatment Preferences and Shares the Quality Data Associated with the Providers?: Yes  Discharge Location  Patient Expects to be Discharged to[de-identified] Home with home health (will arrange hh if ordered and patient agress)

## 2022-05-16 NOTE — PROGRESS NOTES
Nephrology Progress Note  Fiona Sharif  Date of Admission : 5/15/2022    CC:  Follow up for ESRD       Assessment and Plan     ESRD on PD:  - cont current Rx    Hypotension:  - hold nifedipine and hydralazine    + parainfluenza virus    SOB/COPD:  - improving    Anemia of CKD:  - on MADY MWF    Hx of HTN:  - holding some BP meds as above    DM2    CAD       Interval History:  Seen and examined. PD going well overall. Feeling better. No cp, sob, n/v/d reported. Current Medications: all current  Medications have been eviewed in EPIC  Review of Systems: Pertinent items are noted in HPI. Objective:  Vitals:    Vitals:    05/16/22 0514 05/16/22 0600 05/16/22 0647 05/16/22 0956   BP:   132/76 106/66   Pulse:  80 82    Resp:   20    Temp:   98.1 °F (36.7 °C) (!) 90 °F (32.2 °C)   SpO2:   96%    Weight: 99.3 kg (219 lb)      Height:         Intake and Output:  05/16 0701 - 05/16 1900  In: -   Out: 865   05/14 1901 - 05/16 0700  In: 1000 [I.V.:1000]  Out: -     Physical Examination:    General: NAD,Conversant   Neck:  Supple, no mass  Resp:  Lungs CTA B/L, no wheezing , normal respiratory effort  CV:  RRR,  no murmur or rub, trace LE edema  GI:  Soft, NT, + Bowel sounds, PD cath exit site clean  Neurologic:  Non focal  Psych:             AAO x 3 appropriate affect   Skin:  No Rash    []    High complexity decision making was performed  []    Patient is at high-risk of decompensation with multiple organ involvement    Lab Data Personally Reviewed: I have reviewed all the pertinent labs, microbiology data and radiology studies during assessment.     Recent Labs     05/16/22  0508 05/15/22  0351   * 134*   K 3.6 3.4*   CL 97 99   CO2 24 26   * 356*   BUN 48* 34*   CREA 8.03* 6.85*   CA 7.4* 7.9*   ALB  --  2.4*   ALT  --  9*     Recent Labs     05/16/22  0508 05/15/22  0351   WBC 8.6 8.3   HGB 7.6* 8.6*   HCT 23.8* 27.2*    278     No results found for: SDES  Lab Results   Component Value Date/Time Culture result: NO GROWTH 1 DAY 05/15/2022 03:51 AM    Culture result: NO GROWTH 5 DAYS 03/15/2018 06:09 PM     Recent Results (from the past 24 hour(s))   GLUCOSE, POC    Collection Time: 05/15/22 12:44 PM   Result Value Ref Range    Glucose (POC) 536 (H) 65 - 117 mg/dL    Performed by Trell Horne    GLUCOSE, POC    Collection Time: 05/15/22 12:47 PM   Result Value Ref Range    Glucose (POC) 537 (H) 65 - 117 mg/dL    Performed by Trell Horne    GLUCOSE, POC    Collection Time: 05/15/22  3:31 PM   Result Value Ref Range    Glucose (POC) 467 (H) 65 - 117 mg/dL    Performed by Trell Horne    GLUCOSE, POC    Collection Time: 05/15/22  5:23 PM   Result Value Ref Range    Glucose (POC) 487 (H) 65 - 117 mg/dL    Performed by Anh Alberto    GLUCOSE, POC    Collection Time: 05/15/22  9:26 PM   Result Value Ref Range    Glucose (POC) 389 (H) 65 - 117 mg/dL    Performed by Rafael Masters    METABOLIC PANEL, BASIC    Collection Time: 05/16/22  5:08 AM   Result Value Ref Range    Sodium 132 (L) 136 - 145 mmol/L    Potassium 3.6 3.5 - 5.1 mmol/L    Chloride 97 97 - 108 mmol/L    CO2 24 21 - 32 mmol/L    Anion gap 11 5 - 15 mmol/L    Glucose 383 (H) 65 - 100 mg/dL    BUN 48 (H) 6 - 20 MG/DL    Creatinine 8.03 (H) 0.55 - 1.02 MG/DL    BUN/Creatinine ratio 6 (L) 12 - 20      GFR est AA 6 (L) >60 ml/min/1.73m2    GFR est non-AA 5 (L) >60 ml/min/1.73m2    Calcium 7.4 (L) 8.5 - 10.1 MG/DL   CBC WITH AUTOMATED DIFF    Collection Time: 05/16/22  5:08 AM   Result Value Ref Range    WBC 8.6 3.6 - 11.0 K/uL    RBC 2.47 (L) 3.80 - 5.20 M/uL    HGB 7.6 (L) 11.5 - 16.0 g/dL    HCT 23.8 (L) 35.0 - 47.0 %    MCV 96.4 80.0 - 99.0 FL    MCH 30.8 26.0 - 34.0 PG    MCHC 31.9 30.0 - 36.5 g/dL    RDW 14.9 (H) 11.5 - 14.5 %    PLATELET 555 802 - 788 K/uL    MPV 10.3 8.9 - 12.9 FL    NRBC 0.0 0  WBC    ABSOLUTE NRBC 0.00 0.00 - 0.01 K/uL    NEUTROPHILS 79 (H) 32 - 75 %    LYMPHOCYTES 12 12 - 49 %    MONOCYTES 8 5 - 13 % EOSINOPHILS 0 0 - 7 %    BASOPHILS 0 0 - 1 %    IMMATURE GRANULOCYTES 1 (H) 0.0 - 0.5 %    ABS. NEUTROPHILS 6.8 1.8 - 8.0 K/UL    ABS. LYMPHOCYTES 1.1 0.8 - 3.5 K/UL    ABS. MONOCYTES 0.7 0.0 - 1.0 K/UL    ABS. EOSINOPHILS 0.0 0.0 - 0.4 K/UL    ABS. BASOPHILS 0.0 0.0 - 0.1 K/UL    ABS. IMM. GRANS. 0.1 (H) 0.00 - 0.04 K/UL    DF AUTOMATED     GLUCOSE, POC    Collection Time: 05/16/22  9:22 AM   Result Value Ref Range    Glucose (POC) 386 (H) 65 - 117 mg/dL    Performed by Maciel Delgado MD  [address]  Phone - [phone]   Fax - [phone]  [email or web address]

## 2022-05-16 NOTE — PROGRESS NOTES
Hospitalist Progress Note      Hospital summary: [age] y.o lady w/ CHF, COPD, ESRD on PD, DM, HTN, CAD, MI s/p PCI, who presented with dyspnea and was found to have parainfluenza infection and volume-overload. Assessment/Plan:  Shortness of breath: due to parainfluenza infection possible component of volume overload  -supportive care  -PD per nephrology    Type 2 DM w/ hyperglycemia:  -resume Lantus, pt also on mixed insulin? Will add NPH for now and mealtime lispro  -SSI/POC checks    HTN: hold nifedipine and hydralazine due to hypotension     ESRD on PD: per nephrology    COPD: no evidence of bronchospasm    Anemia: due to ESRD? Monitor closely    CHF     GERD    CAD s/p MI/stent    Code status: full  DVT prophylaxis: sq heparin  Disposition: TBD  ----------------------------------------------    CC: f/u shortness of breath    S: breathing better today, has a productive cough, denies pain, dyspnea, n/v/d     Review of Systems:  Pertinent items are noted in HPI.     O:  Visit Vitals  /69   Pulse 90   Temp 98.7 °F (37.1 °C)   Resp 20   Ht 5' 5\" (1.651 m)   Wt 99.3 kg (219 lb)   SpO2 98%   BMI 36.44 kg/m²       PHYSICAL EXAM:  Gen: NAD, non-toxic  HEENT: anicteric sclerae, normal conjunctiva, oropharynx clear, MM moist  Neck: supple, trachea midline, no adenopathy  Heart: RRR, no MRG, no JVD, no peripheral edema  Lungs: mild b/l rhonchi, non-labored respirations  Abd: soft, NT, ND, BS+, no organomegaly  Extr: warm  Skin: dry, no rash  Neuro: CN II-XII grossly intact, normal speech, moves all extremities  Psych: normal mood, appropriate affect      Intake/Output Summary (Last 24 hours) at 5/16/2022 1201  Last data filed at 5/16/2022 0818  Gross per 24 hour   Intake --   Output 865 ml   Net -865 ml        Recent labs & imaging reviewed:  Recent Results (from the past 24 hour(s))   GLUCOSE, POC    Collection Time: 05/15/22 12:44 PM   Result Value Ref Range    Glucose (POC) 536 (H) 65 - 117 mg/dL    Performed by Nguyen Section    GLUCOSE, POC    Collection Time: 05/15/22 12:47 PM   Result Value Ref Range    Glucose (POC) 537 (H) 65 - 117 mg/dL    Performed by Nguyen Section    GLUCOSE, POC    Collection Time: 05/15/22  3:31 PM   Result Value Ref Range    Glucose (POC) 467 (H) 65 - 117 mg/dL    Performed by Nguyen Section    GLUCOSE, POC    Collection Time: 05/15/22  5:23 PM   Result Value Ref Range    Glucose (POC) 487 (H) 65 - 117 mg/dL    Performed by Anali Pérez    GLUCOSE, POC    Collection Time: 05/15/22  9:26 PM   Result Value Ref Range    Glucose (POC) 389 (H) 65 - 117 mg/dL    Performed by [address] [place] [address], BASIC    Collection Time: 05/16/22  5:08 AM   Result Value Ref Range    Sodium 132 (L) 136 - 145 mmol/L    Potassium 3.6 3.5 - 5.1 mmol/L    Chloride 97 97 - 108 mmol/L    CO2 24 21 - 32 mmol/L    Anion gap 11 5 - 15 mmol/L    Glucose 383 (H) 65 - 100 mg/dL    BUN 48 (H) 6 - 20 MG/DL    Creatinine 8.03 (H) 0.55 - 1.02 MG/DL    BUN/Creatinine ratio 6 (L) 12 - 20      GFR est AA 6 (L) >60 ml/min/1.73m2    GFR est non-AA 5 (L) >60 ml/min/1.73m2    Calcium 7.4 (L) 8.5 - 10.1 MG/DL   CBC WITH AUTOMATED DIFF    Collection Time: 05/16/22  5:08 AM   Result Value Ref Range    WBC 8.6 3.6 - 11.0 K/uL    RBC 2.47 (L) 3.80 - 5.20 M/uL    HGB 7.6 (L) 11.5 - 16.0 g/dL    HCT 23.8 (L) 35.0 - 47.0 %    MCV 96.4 80.0 - 99.0 FL    MCH 30.8 26.0 - 34.0 PG    MCHC 31.9 30.0 - 36.5 g/dL    RDW 14.9 (H) 11.5 - 14.5 %    PLATELET 921 221 - 669 K/uL    MPV 10.3 8.9 - 12.9 FL    NRBC 0.0 0  WBC    ABSOLUTE NRBC 0.00 0.00 - 0.01 K/uL    NEUTROPHILS 79 (H) 32 - 75 %    LYMPHOCYTES 12 12 - 49 %    MONOCYTES 8 5 - 13 %    EOSINOPHILS 0 0 - 7 %    BASOPHILS 0 0 - 1 %    IMMATURE GRANULOCYTES 1 (H) 0.0 - 0.5 %    ABS. NEUTROPHILS 6.8 1.8 - 8.0 K/UL    ABS. LYMPHOCYTES 1.1 0.8 - 3.5 K/UL    ABS. MONOCYTES 0.7 0.0 - 1.0 K/UL    ABS. EOSINOPHILS 0.0 0.0 - 0.4 K/UL    ABS.  BASOPHILS 0.0 0.0 - 0.1 K/UL    ABS. IMM. GRANS. 0.1 (H) 0.00 - 0.04 K/UL    DF AUTOMATED     GLUCOSE, POC    Collection Time: 05/16/22  9:22 AM   Result Value Ref Range    Glucose (POC) 386 (H) 65 - 117 mg/dL    Performed by Silverio JUAREZ      Recent Labs     05/16/22  0508 05/15/22  0351   WBC 8.6 8.3   HGB 7.6* 8.6*   HCT 23.8* 27.2*    278     Recent Labs     05/16/22  0508 05/15/22  0351   * 134*   K 3.6 3.4*   CL 97 99   CO2 24 26   BUN 48* 34*   CREA 8.03* 6.85*   * 356*   CA 7.4* 7.9*     Recent Labs     05/15/22  0351   ALT 9*   *   TBILI 0.5   TP 8.1   ALB 2.4*   GLOB 5.7*     No results for input(s): INR, PTP, APTT, INREXT in the last 72 hours. No results for input(s): FE, TIBC, PSAT, FERR in the last 72 hours. Lab Results   Component Value Date/Time    Folate 18.1 12/02/2021 08:16 PM      No results for input(s): PH, PCO2, PO2 in the last 72 hours. No results for input(s): CPK, CKNDX, TROIQ in the last 72 hours.     No lab exists for component: CPKMB  Lab Results   Component Value Date/Time    Cholesterol, total 260 (H) 07/02/2018 04:27 AM    HDL Cholesterol 31 07/02/2018 04:27 AM    LDL, calculated 197.2 (H) 07/02/2018 04:27 AM    Triglyceride 159 (H) 07/02/2018 04:27 AM    CHOL/HDL Ratio 8.4 (H) 07/02/2018 04:27 AM     Lab Results   Component Value Date/Time    Glucose (POC) 386 (H) 05/16/2022 09:22 AM    Glucose (POC) 389 (H) 05/15/2022 09:26 PM    Glucose (POC) 487 (H) 05/15/2022 05:23 PM    Glucose (POC) 467 (H) 05/15/2022 03:31 PM    Glucose (POC) 537 (H) 05/15/2022 12:47 PM     Lab Results   Component Value Date/Time    Color YELLOW/STRAW 12/02/2021 11:00 PM    Appearance CLEAR 12/02/2021 11:00 PM    Specific gravity 1.025 12/02/2021 11:00 PM    Specific gravity 1.010 06/08/2011 12:00 AM    pH (UA) 5.5 12/02/2021 11:00 PM    Protein 300 (A) 12/02/2021 11:00 PM    Glucose 500 (A) 12/02/2021 11:00 PM    Ketone Negative 12/02/2021 11:00 PM    Bilirubin Negative 12/02/2021 11:00 PM Urobilinogen 1.0 12/02/2021 11:00 PM    Nitrites Negative 12/02/2021 11:00 PM    Leukocyte Esterase Negative 12/02/2021 11:00 PM    Epithelial cells FEW 12/02/2021 11:00 PM    Bacteria Negative 12/02/2021 11:00 PM    WBC 0-4 12/02/2021 11:00 PM    RBC 0-5 12/02/2021 11:00 PM       Med list reviewed  Current Facility-Administered Medications   Medication Dose Route Frequency    sodium chloride (NS) flush 5-40 mL  5-40 mL IntraVENous Q8H    sodium chloride (NS) flush 5-40 mL  5-40 mL IntraVENous PRN    sodium chloride (NS) flush 5-10 mL  5-10 mL IntraVENous PRN    sodium chloride (NS) flush 5-40 mL  5-40 mL IntraVENous Q8H    sodium chloride (NS) flush 5-40 mL  5-40 mL IntraVENous PRN    acetaminophen (TYLENOL) tablet 650 mg  650 mg Oral Q6H PRN    Or    acetaminophen (TYLENOL) suppository 650 mg  650 mg Rectal Q6H PRN    polyethylene glycol (MIRALAX) packet 17 g  17 g Oral DAILY PRN    ondansetron (ZOFRAN ODT) tablet 4 mg  4 mg Oral Q8H PRN    Or    ondansetron (ZOFRAN) injection 4 mg  4 mg IntraVENous Q6H PRN    heparin (porcine) injection 5,000 Units  5,000 Units SubCUTAneous Q8H    albuterol-ipratropium (DUO-NEB) 2.5 MG-0.5 MG/3 ML  3 mL Nebulization Q6H PRN    budesonide (PULMICORT) 500 mcg/2 ml nebulizer suspension  500 mcg Nebulization BID RT    aspirin tablet 325 mg  325 mg Oral DAILY    [Held by provider] hydrALAZINE (APRESOLINE) tablet 50 mg  50 mg Oral TID    insulin glargine (LANTUS) injection 20 Units  20 Units SubCUTAneous DAILY    montelukast (SINGULAIR) tablet 10 mg  10 mg Oral QHS    [Held by provider] NIFEdipine ER (PROCARDIA XL) tablet 60 mg  60 mg Oral DAILY    rosuvastatin (CRESTOR) tablet 10 mg  10 mg Oral QHS    insulin lispro (HUMALOG) injection   SubCUTAneous AC&HS    glucose chewable tablet 16 g  4 Tablet Oral PRN    glucagon (GLUCAGEN) injection 1 mg  1 mg IntraMUSCular PRN    dextrose 10 % infusion 0-250 mL  0-250 mL IntraVENous PRN    carvediloL (COREG) tablet 25 mg 25 mg Oral BID WITH MEALS    peritoneal dialysis DEXTROSE 2.5% (2.5 mEq/L low calcium) solution 6,000 mL  6,000 mL IntraPERitoneal DAILY    peritoneal dialysis DEXTROSE 2.5% (2.5 mEq/L low calcium) solution 6,000 mL  6,000 mL IntraPERitoneal DAILY    epoetin joni-epbx (RETACRIT) 12,000 Units combo injection  12,000 Units SubCUTAneous Q MON, WED & FRI    gentamicin (GARAMYCIN) 0.1 % cream   Topical DIALYSIS PRN    gentamicin (GARAMYCIN) 0.1 % ointment   Topical DAILY       Care Plan discussed with:  Patient/Family, Nurse and     Angela Rodriguez MD  Internal Medicine  Date of Service: 5/16/2022

## 2022-05-16 NOTE — PROGRESS NOTES
0800: Bedside shift change report given to Dayday Rashid (oncoming nurse) by Jim Monroe (offgoing nurse). Report included the following information SBAR, Kardex, MAR and Cardiac Rhythm NSR.

## 2022-05-16 NOTE — DIALYSIS
Peritoneal Dialysis Initiation / [phone]     Orders   Therapy Time: 8 hr   Cycles: 5   Fill Volume: 2000mL   Last Fill Volume: 0mL   Total Volume: 1000mL   Solution: 2.5 % Dextrose Dianeal bags      Access   Type & Location: RLQ   Comments: Prior to connection, one-minute Alcavis scrub/soak performed. Dsg change date: 05/16/2022                                   Labs   HBsAg (Antigen) / date:5/16/22 negative   HBsAb (Antibody) / date:5/13/22 immune   Source: pp     Safety:   Time Out Done:   (Time) 1625   Consent obtained/signed: yes   Education: procedural   Primary Nurse Rpt Pre: DIANE Cabrales RN   Primary Nurse Rpt Post: DIANE Payan RN     Comments:   Pt orders, notes, labs, code status reviewed. Start time: 1630  Estimated End Time: 0030    Remained present during initial drain followed by initiation of first fill. Prior to departure, bed in lowest position, patient in chair, call bell and personal belongings within reach.

## 2022-05-16 NOTE — DIALYSIS
Peritoneal Dialysis Initiation / [phone]     Orders   Therapy Time: 8 hrs   Cycles: 5   Fill Volume: 2000 ml   Last Fill Volume: 0 ml   Total Volume: 10,000 ml   Solution: 2x 2.5% Dextrose Dianeal bags      Access   Type & Location: LLQ abd pd catheter   Comments:   Access site no s/sx of infection. Access site care performed per hospital P/P - Gentamycin dressing changed dated 05/15/22. Transfer set scrubbed w/ alcavis for 1 minute before connecting. Cycler primed and tested w/ 2.5% Dianeal solution    Dsg change date: 05/15/22                                 Labs   HBsAg (Antigen) / date: Negative                       05/13/22                       HBsAb (Antibody) / date: Immune  05/13/22   Source: [employer] [facility]     Safety:   Time Out Done:   (Time) 2010   Consent obtained/signed: Verified   Education: Access / Site Care   Primary Nurse Rpt Pre: KEN Faustin RN   Primary Nurse Rpt Post: KEN Faustin RN     Comments:   Willian Silva 1154 RN at bedside to initiate [facility] tx for the night. Pt orders, notes, labs, code status reviewed. Remained present during initial drain followed by initiation of first fill. Prior to departure, bed in lowest position, call bell and personal belongings within reach. Lines visible, secure, and connections fastened well. Education and pre/post report given to primary RN.      Start time: 2020   05/15/22  Estimated End Time: 4258 05/16/22

## 2022-05-17 ENCOUNTER — HOME HEALTH ADMISSION (OUTPATIENT)
Dept: HOME HEALTH SERVICES | Facility: HOME HEALTH | Age: 67
End: 2022-05-17
Payer: MEDICARE

## 2022-05-17 VITALS
RESPIRATION RATE: 19 BRPM | TEMPERATURE: 97.9 F | BODY MASS INDEX: 36.09 KG/M2 | SYSTOLIC BLOOD PRESSURE: 114 MMHG | OXYGEN SATURATION: 97 % | WEIGHT: 216.6 LBS | HEIGHT: 65 IN | DIASTOLIC BLOOD PRESSURE: 50 MMHG | HEART RATE: 77 BPM

## 2022-05-17 LAB
ANION GAP SERPL CALC-SCNC: 14 MMOL/L (ref 5–15)
BUN SERPL-MCNC: 53 MG/DL (ref 6–20)
BUN/CREAT SERPL: 6 (ref 12–20)
CALCIUM SERPL-MCNC: 7.1 MG/DL (ref 8.5–10.1)
CHLORIDE SERPL-SCNC: 97 MMOL/L (ref 97–108)
CO2 SERPL-SCNC: 23 MMOL/L (ref 21–32)
CREAT SERPL-MCNC: 8.55 MG/DL (ref 0.55–1.02)
ERYTHROCYTE [DISTWIDTH] IN BLOOD BY AUTOMATED COUNT: 15.1 % (ref 11.5–14.5)
GLUCOSE BLD STRIP.AUTO-MCNC: 109 MG/DL (ref 65–117)
GLUCOSE BLD STRIP.AUTO-MCNC: 145 MG/DL (ref 65–117)
GLUCOSE BLD STRIP.AUTO-MCNC: 156 MG/DL (ref 65–117)
GLUCOSE SERPL-MCNC: 196 MG/DL (ref 65–100)
HCT VFR BLD AUTO: 23.3 % (ref 35–47)
HGB BLD-MCNC: 7.5 G/DL (ref 11.5–16)
MCH RBC QN AUTO: 31.4 PG (ref 26–34)
MCHC RBC AUTO-ENTMCNC: 32.2 G/DL (ref 30–36.5)
MCV RBC AUTO: 97.5 FL (ref 80–99)
NRBC # BLD: 0.03 K/UL (ref 0–0.01)
NRBC BLD-RTO: 0.3 PER 100 WBC
PLATELET # BLD AUTO: 321 K/UL (ref 150–400)
PMV BLD AUTO: 10.2 FL (ref 8.9–12.9)
POTASSIUM SERPL-SCNC: 3.2 MMOL/L (ref 3.5–5.1)
RBC # BLD AUTO: 2.39 M/UL (ref 3.8–5.2)
SERVICE CMNT-IMP: ABNORMAL
SERVICE CMNT-IMP: ABNORMAL
SERVICE CMNT-IMP: NORMAL
SODIUM SERPL-SCNC: 134 MMOL/L (ref 136–145)
WBC # BLD AUTO: 10.3 K/UL (ref 3.6–11)

## 2022-05-17 PROCEDURE — 36415 COLL VENOUS BLD VENIPUNCTURE: CPT

## 2022-05-17 PROCEDURE — 74011636637 HC RX REV CODE- 636/637: Performed by: HOSPITALIST

## 2022-05-17 PROCEDURE — 74011250637 HC RX REV CODE- 250/637: Performed by: INTERNAL MEDICINE

## 2022-05-17 PROCEDURE — 82962 GLUCOSE BLOOD TEST: CPT

## 2022-05-17 PROCEDURE — 94640 AIRWAY INHALATION TREATMENT: CPT

## 2022-05-17 PROCEDURE — 77010033678 HC OXYGEN DAILY

## 2022-05-17 PROCEDURE — 74011000250 HC RX REV CODE- 250: Performed by: HOSPITALIST

## 2022-05-17 PROCEDURE — 96372 THER/PROPH/DIAG INJ SC/IM: CPT

## 2022-05-17 PROCEDURE — 80048 BASIC METABOLIC PNL TOTAL CA: CPT

## 2022-05-17 PROCEDURE — 74011250636 HC RX REV CODE- 250/636: Performed by: HOSPITALIST

## 2022-05-17 PROCEDURE — 87086 URINE CULTURE/COLONY COUNT: CPT

## 2022-05-17 PROCEDURE — 74011250637 HC RX REV CODE- 250/637: Performed by: HOSPITALIST

## 2022-05-17 PROCEDURE — 97535 SELF CARE MNGMENT TRAINING: CPT

## 2022-05-17 PROCEDURE — 85027 COMPLETE CBC AUTOMATED: CPT

## 2022-05-17 PROCEDURE — 97165 OT EVAL LOW COMPLEX 30 MIN: CPT

## 2022-05-17 RX ORDER — INSULIN LISPRO 100 [IU]/ML
10 INJECTION, SOLUTION INTRAVENOUS; SUBCUTANEOUS
Status: DISCONTINUED | OUTPATIENT
Start: 2022-05-17 | End: 2022-05-17 | Stop reason: HOSPADM

## 2022-05-17 RX ORDER — HYDROCODONE BITARTRATE AND ACETAMINOPHEN 5; 325 MG/1; MG/1
1 TABLET ORAL
Status: DISCONTINUED | OUTPATIENT
Start: 2022-05-17 | End: 2022-05-17 | Stop reason: HOSPADM

## 2022-05-17 RX ORDER — GUAIFENESIN/DEXTROMETHORPHAN 100-10MG/5
5 SYRUP ORAL
Status: DISCONTINUED | OUTPATIENT
Start: 2022-05-17 | End: 2022-05-17 | Stop reason: HOSPADM

## 2022-05-17 RX ORDER — GUAIFENESIN 600 MG/1
600 TABLET, EXTENDED RELEASE ORAL 2 TIMES DAILY
Qty: 10 TABLET | Refills: 0 | Status: SHIPPED | OUTPATIENT
Start: 2022-05-17 | End: 2022-05-22

## 2022-05-17 RX ADMIN — HEPARIN SODIUM 5000 UNITS: 5000 INJECTION INTRAVENOUS; SUBCUTANEOUS at 04:00

## 2022-05-17 RX ADMIN — Medication 10 UNITS: at 12:40

## 2022-05-17 RX ADMIN — SODIUM CHLORIDE, PRESERVATIVE FREE 10 ML: 5 INJECTION INTRAVENOUS at 06:00

## 2022-05-17 RX ADMIN — HYDROCODONE BITARTRATE AND ACETAMINOPHEN 1 TABLET: 5; 325 TABLET ORAL at 12:46

## 2022-05-17 RX ADMIN — ASPIRIN 325 MG ORAL TABLET 325 MG: 325 PILL ORAL at 09:40

## 2022-05-17 RX ADMIN — Medication 25 UNITS: at 09:42

## 2022-05-17 RX ADMIN — Medication 2 UNITS: at 09:41

## 2022-05-17 RX ADMIN — BUDESONIDE 500 MCG: 0.5 INHALANT RESPIRATORY (INHALATION) at 07:09

## 2022-05-17 RX ADMIN — HEPARIN SODIUM 5000 UNITS: 5000 INJECTION INTRAVENOUS; SUBCUTANEOUS at 12:39

## 2022-05-17 RX ADMIN — Medication 10 UNITS: at 09:42

## 2022-05-17 RX ADMIN — CARVEDILOL 25 MG: 12.5 TABLET, FILM COATED ORAL at 09:40

## 2022-05-17 NOTE — DIALYSIS
Peritoneal Dialysis Disconnection / [phone]           Metrics   I-Drain: 10 mls   Total UF: 566 mls   Last Fill: 0 mls   Last Manual Drain: 0 mls   Net UF:         576 mls   Avg Dwell Time: 1 hr 8 mins   Lost Dwell Time: 11 mins   Alarms: None   Effluent: Clear/yellow            Access   Type & Location: Right ABD [place] PD Catheter   Comments: Dsg remains clean, dry and intact. Line secured to abdomen.     Dsg date: 5/16/22                                          Comments: At bedside to disconnect pt from CCPD tx. Orders, consent, and code status confirmed. Tx complete. Prior to aseptic disconnection, one minute Alcavis scrub performed and sterile mini cap applied. Used cassette and bags discarded in biohazard bag. On departure bed in lowest position with call bell and all personal belongings within reach. Education and pre/post report given to pt & primary RN.

## 2022-05-17 NOTE — DISCHARGE SUMMARY
Discharge Summary     Patient: Froilan Adan MRN: [id number]  SSN: [id number]    YOB: 1955  Age: 78 y.o. Sex: female       Admit Date: 5/15/2022    Discharge Date: 5/17/2022      Admission Diagnoses: SOB (shortness of breath) [R06.02]    Discharge Diagnoses:   Parainfluenza 3 infection  Type 2 DM w/ hyperglycemia    Chronic diagnoses  ESRD on PD  HTN  COPDAnemia  CHF  GERD  CAD s/p MI s/p stent       Discharge Condition: Stable    Hospital Course: [age] y.o lady w/ CHF, COPD, ESRD on PD, DM, HTN, CAD, MI s/p PCI, who presented with dyspnea and was found to have parainfluenza infection and volume-overload.        Assessment/Plan:  Shortness of breath: due to parainfluenza infection possible component of volume overload  -supportive care  -on room air without respiratory distress on the day of discharge     Type 2 DM w/ hyperglycemia  HTN: nifedipine stopped on discharge due to borderline BP      ESRD on PD: per nephrology     COPD: no evidence of bronchospasm     Anemia     CHF      GERD     CAD s/p MI/stent    Consults: Nephrology    Significant Diagnostic Studies:   XR CHEST PORT    Result Date: 5/15/2022  No evidence of acute cardiopulmonary process. Disposition: home    S: no acute complaints, some back discomfort after working with therapy, some chest congestion but no dyspnea, n/v/d    Visit Vitals  /61 (BP 1 Location: Left upper arm, BP Patient Position: At rest)   Pulse 72   Temp 98.5 °F (36.9 °C)   Resp 14   Ht 5' 5\" (1.651 m)   Wt 98.2 kg (216 lb 9.6 oz)   SpO2 100%   BMI 36.04 kg/m²     NAD  RRR  Lungs w/ mild b/l rhonchi, normal work of breathing on room air  Abd soft NT mildly distended      Discharge Medications:   Current Discharge Medication List      START taking these medications    Details   guaiFENesin ER (MUCINEX) 600 mg ER tablet Take 1 Tablet by mouth two (2) times a day for 5 days.   Qty: 10 Tablet, Refills: 0         CONTINUE these medications which have NOT CHANGED    Details   carvediloL (Coreg) 25 mg tablet Take 25 mg by mouth two (2) times daily (with meals). montelukast (SINGULAIR) 10 mg tablet Take 1 Tab by mouth nightly. Qty: 30 Tab, Refills: 0      dulaglutide (Trulicity) 0.42 CS/1.2 mL sub-q pen 0.75 mg by SubCUTAneous route every seven (7) days. Unsure of dosage      insulin glargine (LANTUS,BASAGLAR) 100 unit/mL (3 mL) inpn 20 Units by SubCUTAneous route. rosuvastatin (CRESTOR) 20 mg tablet Take 0.5 Tablets by mouth nightly. Take 1 Tab by mouth nightly. Dose reduced from 20 mg to 10 mg.  Qty: 30 Tablet, Refills: 0      epoetin joni-epbx (RETACRIT) 20,000 unit/mL injection 1 mL by SubCUTAneous route every Monday, Wednesday, Friday. Indications: anemia due to kidney failure  Indications: anemia due to kidney failure  Qty: 30 Each, Refills: 0      hydrALAZINE (APRESOLINE) 50 mg tablet Take 1 Tablet by mouth three (3) times daily. Qty: 90 Tablet, Refills: 0      mometasone-formoterol (DULERA) 200-5 mcg/actuation HFA inhaler Take 2 Puffs by inhalation two (2) times a day. insulin NPH/insulin regular (NOVOLIN 70/30 U-100 INSULIN) 100 unit/mL (70-30) injection 45 Units by SubCUTAneous route two (2) times a day. Qty: 10 mL, Refills: 0      albuterol-ipratropium (DUO-NEB) 2.5 mg-0.5 mg/3 ml nebu 3 mL by Nebulization route every six (6) hours as needed. Qty: 30 Nebule, Refills: 0      aspirin (ASPIRIN) 325 mg tablet Take 325 mg by mouth daily. STOP taking these medications       NIFEdipine ER (PROCARDIA XL) 60 mg ER tablet Comments:   Reason for Stopping: Follow-up Appointments   Procedures    FOLLOW UP VISIT Appointment in: 3 - 5 Days     Standing Status:   Standing     Number of Occurrences:   1     Order Specific Question:   Appointment in     Answer:   3 - 5 Days       Signed By: Davion Orozco MD     May 17, 2022      Greater than 30 minutes spent on discharge management.

## 2022-05-17 NOTE — PROGRESS NOTES
Problem: Self Care Deficits Care Plan (Adult)  Goal: *Acute Goals and Plan of Care (Insert Text)  Description: FUNCTIONAL STATUS PRIOR TO ADMISSION: Patient was independent and active without use of DME.     HOME SUPPORT: The patient usually lived alone, however, her children are staying with her for a couple of weeks. Pt's sister lives next door. Occupational Therapy Goals  Initiated 5/17/2022  1. Patient will perform grooming at sink with supervision/set-up within 7 day(s). 2.  Patient will perform UB and LB bathing with supervision/set-up within 7 day(s). 3.  Patient will perform lower body dressing with supervision/set-up within 7 day(s). 4.  Patient will perform toilet transfers with supervision/set-up within 7 day(s). 5.  Patient will perform all aspects of toileting with supervision/set-up within 7 day(s). 6.  Patient will participate in upper extremity therapeutic exercise/activities with independence for 10 minutes within 7 day(s). 7.  Patient will utilize energy conservation techniques during functional activities with verbal cues within 7 day(s). Outcome: Not Met     OCCUPATIONAL THERAPY EVALUATION  Patient: Darius Pinedo (76 y.o. female)  Date: 5/17/2022  Primary Diagnosis: SOB (shortness of breath) [R06.02]        Precautions:   Fall    ASSESSMENT  Based on the objective data described below, the patient presents with generalized weakness, SOB with activity, and impaired sensation in LLE following admission for SOB and parainfluenza. Pt received supine in bed on 3L O2 via NC and willing to work with therapy. Completed bed mobility to sit EOB (SBA and extra time) and sit>stand with RW (CGA). Educated pt on PLB and removed O2 for duration of session with O2 sats remaining at 92% or greater. Completed functional mobility to bathroom for toileting and grooming at sink (CGA) and returned to chair. Pt left off O2 and nursing aware. Currently recommend HHOT at discharge.      Current Level of Function Impacting Discharge (ADLs/self-care): CGA for functional mobility/transfers, Set up for UB ADLs, Min A for LB ADLs    Functional Outcome Measure: The patient scored Total: 70/100 on the Barthel Index outcome measure which is indicative of being minimally independent in basic self-care. Other factors to consider for discharge: Currently some support at home with children living there for a few weeks      Patient will benefit from skilled therapy intervention to address the above noted impairments. PLAN :  Recommendations and Planned Interventions: self care training, functional mobility training, therapeutic exercise, therapeutic activities, endurance activities, patient education and home safety training    Frequency/Duration: Patient will be followed by occupational therapy 3 times a week to address goals. Recommendation for discharge: (in order for the patient to meet his/her long term goals)  Occupational therapy at least 2 days/week in the home     This discharge recommendation:  Has not yet been discussed the attending provider and/or case management    IF patient discharges home will need the following DME: walker: rolling? SUBJECTIVE:   Patient stated If my oxygen is good then why am I out of breath?     OBJECTIVE DATA SUMMARY:   HISTORY:   Past Medical History:   Diagnosis Date    Asthma     CAD (coronary artery disease)     [facility]est[facility]     Chronic kidney disease     HD  Radu Menon [phone]    COPD (chronic obstructive pulmonary disease) ([facility])     Diabetes ([facility])     DM2    GERD (gastroesophageal reflux disease)     Hypertension      Past Surgical History:   Procedure Laterality Date    DELIVERY       HX CORONARY STENT PLACEMENT      HX HYSTERECTOMY      IR INSERT NON TUNL CVC OVER 5 YRS  12/3/2021    IR INSERT TUNL CVC W/O PORT OVER 5 YR  2021    PA CARDIAC SURG PROCEDURE UNLIST      VASCULAR SURGERY PROCEDURE UNLIST      leg stent       Expanded or extensive additional review of patient history:     Home Situation  Home Environment: Private residence  # Steps to Enter: 3  Rails to Enter: Yes  Hand Rails : Bilateral  One/Two Story Residence: One story  Living Alone: No (children staying with her for a few weeks)  Support Systems: Child(mansoor),Other Family Member(s)  Patient Expects to be Discharged to[de-identified] Home with home health  Current DME Used/Available at Home: Cane, straight        EXAMINATION OF PERFORMANCE DEFICITS:  Cognitive/Behavioral Status:  Neurologic State: Alert  Orientation Level: Oriented X4  Cognition: Appropriate decision making; Appropriate for age attention/concentration; Appropriate safety awareness; Follows commands  Perception: Appears intact  Perseveration: No perseveration noted  Safety/Judgement: Awareness of environment; Insight into deficits    Skin: dry, intact     Edema:  None noted     Hearing: Auditory  Auditory Impairment: None    Vision/Perceptual:       Corrective Lenses: Glasses    Range of Motion:    AROM: Generally decreased, functional     Strength:    Strength: Generally decreased, functional     Coordination:  Coordination: Generally decreased, functional  Fine Motor Skills-Upper: Left Intact; Right Intact    Gross Motor Skills-Upper: Left Intact; Right Intact    Tone & Sensation:    Tone: Normal  Sensation: Impaired (LLE numbness from knee to foot)     Balance:  Sitting: Intact  Standing: Impaired  Standing - Static: Constant support;Good  Standing - Dynamic : Constant support; Fair    Functional Mobility and Transfers for ADLs:  Bed Mobility:  Supine to Sit: Contact guard assistance; Additional time  Scooting: Contact guard assistance    Transfers:  Sit to Stand: Contact guard assistance  Stand to Sit: Stand-by assistance  Bed to Chair: Contact guard assistance  Bathroom Mobility: Contact guard assistance  Toilet Transfer : Contact guard assistance  Assistive Device : Gait Belt;Walker, rolling    ADL Assessment:  Feeding: Independent    Oral Facial Hygiene/Grooming: Independent    Bathing: Contact guard assistance    Upper Body Dressing: Independent    Lower Body Dressing: Contact guard assistance    Toileting: Contact guard assistance        ADL Intervention and task modifications:       Cognitive Retraining  Safety/Judgement: Awareness of environment; Insight into deficits      Functional Measure:    Barthel Index:  Bathin  Bladder: 10  Bowels: 10  Groomin  Dressin  Feeding: 10  Mobility: 10  Stairs: 5  Toilet Use: 5  Transfer (Bed to Chair and Back): 10  Total: 70/100      The Barthel ADL Index: Guidelines  1. The index should be used as a record of what a patient does, not as a record of what a patient could do. 2. The main aim is to establish degree of independence from any help, physical or verbal, however minor and for whatever reason. 3. The need for supervision renders the patient not independent. 4. A patient's performance should be established using the best available evidence. Asking the patient, friends/relatives and nurses are the usual sources, but direct observation and common sense are also important. However direct testing is not needed. 5. Usually the patient's performance over the preceding 24-48 hours is important, but occasionally longer periods will be relevant. 6. Middle categories imply that the patient supplies over 50 per cent of the effort. 7. Use of aids to be independent is allowed. Score Interpretation (from [address] [place] [address])    Independent   60-79 Minimally independent   40-59 Partially dependent   20-39 Very dependent   <20 Totally dependent     -Beatriz Escobar., Barthel, D.W. (1965). Functional evaluation: the Barthel Index. [facility] ([address] (1997). The Barthel activities of daily living index: self-reporting versus actual performance in the old (> or = 75 years). Journal of [address] [place] [address] 45(6), 135-090. MOLLY Marks, Thea Garcia., Curt Dumont., Norberto Sagastume. (1999). Measuring the change in disability after inpatient rehabilitation; comparison of the responsiveness of the Barthel Index and Functional [place] Measure. Journal of Neurology, Neurosurgery, and Psychiatry, 66(4), 859-952. SARAI Shaikh, BAIRON Oviedo, & Cheryl Salcedo M.A. (2004) Assessment of post-stroke quality of life in cost-effectiveness studies: The usefulness of the Barthel Index and the EuroQoL-5D. Quality of Life Research, 15, 402-82     Occupational Therapy Evaluation Charge Determination   History Examination Decision-Making   LOW Complexity : Brief history review  LOW Complexity : 1-3 performance deficits relating to physical, cognitive , or psychosocial skils that result in activity limitations and / or participation restrictions  LOW Complexity : No comorbidities that affect functional and no verbal or physical assistance needed to complete eval tasks       Based on the above components, the patient evaluation is determined to be of the following complexity level: LOW   Pain Rating:  None reported     Activity Tolerance:   Good    After treatment patient left in no apparent distress:    Sitting in chair and Call bell within reach    COMMUNICATION/EDUCATION:   The patients plan of care was discussed with: Registered nurse. Home safety education was provided and the patient/caregiver indicated understanding. This patients plan of care is appropriate for delegation to [facility].     Thank you for this referral.  Vicky Pitt OT  Time Calculation: 36 mins

## 2022-05-17 NOTE — DISCHARGE INSTRUCTIONS
Discharge Instructions       PATIENT ID: Darius Pinedo  MRN: [id number]   YOB: 1955    DATE OF ADMISSION: 5/15/2022  3:25 AM    DATE OF DISCHARGE: 5/17/2022    PRIMARY CARE PROVIDER: Aidan Dyer MD     ATTENDING PHYSICIAN: Harvinder Matute MD  DISCHARGING PROVIDER: Heidi Cotter MD    To contact this individual call [phone] and ask the  to page. If unavailable ask to be transferred the Adult Hospitalist Department. DISCHARGE DIAGNOSES parainfluenza 3 infection    CONSULTATIONS: IP CONSULT TO HOSPITALIST  IP CONSULT TO NEPHROLOGY  IP CONSULT TO NEPHROLOGY    PROCEDURES/SURGERIES: * No surgery found *    PENDING TEST RESULTS:   At the time of discharge the following test results are still pending: finalization of blood cultures    FOLLOW UP APPOINTMENTS:   Follow-up Information     Follow up With Specialties Details Why Contact Info    Aidan Dyer MD Family Medicine Schedule an appointment as soon as possible for a visit in 1 week  [address]  [phone]             ADDITIONAL CARE RECOMMENDATIONS: you were found to have parainfluenza infection that is currently mild. There is no antibiotic or antiviral medication for this common upper respiratory tract infection virus, it should continue improving over the next few days. DIET: Renal Diet    ACTIVITY: Activity as tolerated and PT/OT per Home Health      DISCHARGE MEDICATIONS:   See Medication Reconciliation Form    · It is important that you take the medication exactly as they are prescribed. · Keep your medication in the bottles provided by the pharmacist and keep a list of the medication names, dosages, and times to be taken in your wallet. · Do not take other medications without consulting your doctor. NOTIFY YOUR PHYSICIAN FOR ANY OF THE FOLLOWING:   Fever over 101 degrees for 24 hours.    Chest pain, shortness of breath, fever, chills, nausea, vomiting, diarrhea, change in mentation, falling, weakness, bleeding. Severe pain or pain not relieved by medications. Or, any other signs or symptoms that you may have questions about.       DISPOSITION:  x  Home With:   OT  PT x HH  RN       SNF/Inpatient Rehab/LTAC    Independent/assisted living    Hospice    Other:       Signed:   Zeina Hutchison MD  5/17/2022  11:43 AM

## 2022-05-17 NOTE — PROGRESS NOTES
Bedside shift change report given to Eladia Malik (oncoming nurse) by Leah Vargas (offgoing nurse). Report included the following information SBAR, Procedure Summary, MAR, Recent Results and Cardiac Rhythm NSR.

## 2022-05-17 NOTE — NURSE NAVIGATOR
Chart reviewed by Heart Failure Nurse Navigator. Heart Failure database completed. EF:  60/65% -echo 12/9/21    ACEi/ARB/ARNi: not currently indicated    BB: coreg 25 mg twice daily    Aldosterone Antagonist: not currently indicated    Obstructive Sleep Apnea Screening:   Referred to Sleep Medicine:     CRT not currently indicated    NYHA Functional Class documentation requested. Heart Failure Teach Back in Patient Education. Heart Failure Avoiding Triggers on Discharge Instructions. Cardiologist: not consulted      Post discharge follow up phone call to be made within 48-72 hours of discharge.

## 2022-05-17 NOTE — DIALYSIS
CCPD Note [facility]:  CCPD discontinued per MD orders, pt discharging home. Machine externally disinfected per policy & procedure and returned to supply closet. Pre/post report given to KEN Phan, primary RN.

## 2022-05-17 NOTE — PROGRESS NOTES
Transition of Care Plan  RUR 17%    Disposition  Home    Transportation  Family    ESRD-- Peritoneal dialysis   Home Unit  [facility] [phone] [address] [facility] accepted [phone]    DME--Accepted  [facility]--[phone]-  [facility] order received-- Referral to [facility]-- Paperwork completed, demographics and order fax attached and referral sent via [address] Patient given RW from Care Management Office. Patient provided with copy of paperwork from [facility]    Medical follow up  PCP and specialist    Contact    Ruben Olvera  [phone]    CM met with patient in her to discuss transition of care plan  Patient is in agreement with discharge-- She does not want to appeal it. Cm read and explained  her most of the 2nd Medicare letter and she was firm on being discharged today. She is aware of the right to appeal.  She signed 2nd Medicare letter and it was placed in her chart. Patient in agreement with RW-- paperwork completed for RW with [facility]-- referral with order, demographics and paperwork faxed attached and sent via [facility]-- Raquel Pal at [facility] confirmed it was received    [place] referrals sent to [facility], [facility], [facility] , [facility], [facility], [facility], [facility]   and [facility]  Waiting for responses. Medicare pt has received, reviewed, and signed 2nd  letter informing them of their right to appeal the discharge. Signed copy has been placed on pt bedside chart. Daughter will transport home.       UPDATE 1:50 pm  [facility]   No  [facility]   No  [facility]   No  [facility]   No  [facility]    No  UPDATE 3:30 pm  BS YES

## 2022-05-17 NOTE — PROGRESS NOTES
Nephrology Progress Note  Stefany Henry  Date of Admission : 5/15/2022    CC:  Follow up for ESRD       Assessment and Plan     ESRD on PD:  - cont current Rx    Hypotension:  - hold nifedipine and hydralazine    + parainfluenza virus    SOB/COPD:  - improving    Anemia of CKD:  - on MADY MWF    Hx of HTN:  - holding some BP meds as above    DM2    CAD       Interval History:  Seen and examined. PD going well overall. Feeling better. C/o back pain. No cp, sob, n/v/d reported. Current Medications: all current  Medications have been eviewed in EPIC  Review of Systems: Pertinent items are noted in HPI. Objective:  Vitals:    Vitals:    05/17/22 0657 05/17/22 0700 05/17/22 0938 05/17/22 1000   BP:  131/70 (!) 147/84    Pulse:  75 83 82   Resp:  14 18    Temp:  98 °F (36.7 °C) 98.4 °F (36.9 °C)    SpO2:   96%    Weight: 98.2 kg (216 lb 9.6 oz)      Height:         Intake and Output:  No intake/output data recorded. 05/15 1901 - 05/17 0700  In: -   Out: 2119     Physical Examination:    General: NAD,Conversant   Neck:  Supple, no mass  Resp:  Lungs CTA B/L, no wheezing , normal respiratory effort  CV:  RRR,  no murmur or rub, trace LE edema  GI:  Soft, NT, + Bowel sounds, PD cath exit site clean  Neurologic:  Non focal  Psych:             AAO x 3 appropriate affect   Skin:  No Rash    []    High complexity decision making was performed  []    Patient is at high-risk of decompensation with multiple organ involvement    Lab Data Personally Reviewed: I have reviewed all the pertinent labs, microbiology data and radiology studies during assessment.     Recent Labs     05/17/22 0513 05/16/22  0508 05/15/22  0351   * 132* 134*   K 3.2* 3.6 3.4*   CL 97 97 99   CO2 23 24 26   * 383* 356*   BUN 53* 48* 34*   CREA 8.55* 8.03* 6.85*   CA 7.1* 7.4* 7.9*   ALB  --   --  2.4*   ALT  --   --  9*     Recent Labs     05/17/22  0513 05/16/22  0508 05/15/22  0351   WBC 10.3 8.6 8.3   HGB 7.5* 7.6* 8.6*   HCT 23.3* 23.8* 27.2*    282 278     No results found for: SDES  Lab Results   Component Value Date/Time    Culture result: NO GROWTH 2 DAYS 05/15/2022 03:51 AM    Culture result: NO GROWTH 5 DAYS 03/15/2018 06:09 PM     Recent Results (from the past 24 hour(s))   GLUCOSE, POC    Collection Time: 05/16/22 11:58 AM   Result Value Ref Range    Glucose (POC) 397 (H) 65 - 117 mg/dL    Performed by Silverio JUAREZ    GLUCOSE, POC    Collection Time: 05/16/22  5:46 PM   Result Value Ref Range    Glucose (POC) 389 (H) 65 - 117 mg/dL    Performed by [facility], POC    Collection Time: 05/16/22  6:51 PM   Result Value Ref Range    Glucose (POC) 321 (H) 65 - 117 mg/dL    Performed by [facility], POC    Collection Time: 05/16/22  9:29 PM   Result Value Ref Range    Glucose (POC) 324 (H) 65 - 117 mg/dL    Performed by Laquita Ferrera    CBC W/O DIFF    Collection Time: 05/17/22  5:13 AM   Result Value Ref Range    WBC 10.3 3.6 - 11.0 K/uL    RBC 2.39 (L) 3.80 - 5.20 M/uL    HGB 7.5 (L) 11.5 - 16.0 g/dL    HCT 23.3 (L) 35.0 - 47.0 %    MCV 97.5 80.0 - 99.0 FL    MCH 31.4 26.0 - 34.0 PG    MCHC 32.2 30.0 - 36.5 g/dL    RDW 15.1 (H) 11.5 - 14.5 %    PLATELET 161 168 - 373 K/uL    MPV 10.2 8.9 - 12.9 FL    NRBC 0.3 (H) 0  WBC    ABSOLUTE NRBC 0.03 (H) 0.00 - 5.40 K/uL   METABOLIC PANEL, BASIC    Collection Time: 05/17/22  5:13 AM   Result Value Ref Range    Sodium 134 (L) 136 - 145 mmol/L    Potassium 3.2 (L) 3.5 - 5.1 mmol/L    Chloride 97 97 - 108 mmol/L    CO2 23 21 - 32 mmol/L    Anion gap 14 5 - 15 mmol/L    Glucose 196 (H) 65 - 100 mg/dL    BUN 53 (H) 6 - 20 MG/DL    Creatinine 8.55 (H) 0.55 - 1.02 MG/DL    BUN/Creatinine ratio 6 (L) 12 - 20      GFR est AA 6 (L) >60 ml/min/1.73m2    GFR est non-AA 5 (L) >60 ml/min/1.73m2    Calcium 7.1 (L) 8.5 - 10.1 MG/DL   GLUCOSE, POC    Collection Time: 05/17/22  8:30 AM   Result Value Ref Range    Glucose (POC) 156 (H) 65 - 117 mg/dL    Performed by Kareem Mishra (CON)              Patricio Novak MD  [facility]   [address] [address]  Phone - [phone]   Fax - [phone]  [email or web address]

## 2022-05-18 ENCOUNTER — HOME CARE VISIT (OUTPATIENT)
Dept: HOME HEALTH SERVICES | Facility: HOME HEALTH | Age: 67
End: 2022-05-18
Payer: MEDICARE

## 2022-05-18 ENCOUNTER — TELEPHONE (OUTPATIENT)
Dept: CASE MANAGEMENT | Age: 67
End: 2022-05-18

## 2022-05-18 ENCOUNTER — HOME CARE VISIT (OUTPATIENT)
Dept: SCHEDULING | Facility: HOME HEALTH | Age: 67
End: 2022-05-18
Payer: MEDICARE

## 2022-05-18 VITALS
BODY MASS INDEX: 35.65 KG/M2 | SYSTOLIC BLOOD PRESSURE: 130 MMHG | OXYGEN SATURATION: 95 % | HEIGHT: 65 IN | TEMPERATURE: 97 F | HEART RATE: 91 BPM | WEIGHT: 214 LBS | RESPIRATION RATE: 20 BRPM | DIASTOLIC BLOOD PRESSURE: 78 MMHG

## 2022-05-18 LAB
BACTERIA SPEC CULT: NORMAL
CC UR VC: NORMAL
INFLUENZA A AB, IGG 828524: 3.42 IV
INFLUENZA A VIRUS IGM: 2.88 IV
INFLUENZA B VIRUS AB, IGG: 2.54 IV
INFLUENZA B VIRUS AB, IGM: 1.53 IV
SERVICE CMNT-IMP: NORMAL

## 2022-05-18 PROCEDURE — 400013 HH SOC

## 2022-05-18 PROCEDURE — G0151 HHCP-SERV OF PT,EA 15 MIN: HCPCS

## 2022-05-18 NOTE — TELEPHONE ENCOUNTER
HF NN attempted to reach patient for post discharge follow up phone call. Left message requesting return call.

## 2022-05-19 ENCOUNTER — TELEPHONE (OUTPATIENT)
Dept: CASE MANAGEMENT | Age: 67
End: 2022-05-19

## 2022-05-19 NOTE — TELEPHONE ENCOUNTER
NOEMY attempted to contact patient for post discharge follow up appt.   No answer and unable to leave message  Armani Monge RN-CHFN/KERONNN

## 2022-05-20 ENCOUNTER — HOME CARE VISIT (OUTPATIENT)
Dept: HOME HEALTH SERVICES | Facility: HOME HEALTH | Age: 67
End: 2022-05-20
Payer: MEDICARE

## 2022-05-20 ENCOUNTER — HOME CARE VISIT (OUTPATIENT)
Dept: SCHEDULING | Facility: HOME HEALTH | Age: 67
End: 2022-05-20
Payer: MEDICARE

## 2022-05-20 LAB
BACTERIA SPEC CULT: NORMAL
SERVICE CMNT-IMP: NORMAL

## 2022-05-20 PROCEDURE — G0152 HHCP-SERV OF OT,EA 15 MIN: HCPCS

## 2022-05-24 ENCOUNTER — HOME CARE VISIT (OUTPATIENT)
Dept: SCHEDULING | Facility: HOME HEALTH | Age: 67
End: 2022-05-24
Payer: MEDICARE

## 2022-05-24 VITALS
DIASTOLIC BLOOD PRESSURE: 66 MMHG | BODY MASS INDEX: 35.11 KG/M2 | SYSTOLIC BLOOD PRESSURE: 140 MMHG | TEMPERATURE: 98 F | WEIGHT: 211 LBS | OXYGEN SATURATION: 93 % | HEART RATE: 99 BPM

## 2022-05-24 PROCEDURE — G0151 HHCP-SERV OF PT,EA 15 MIN: HCPCS

## 2022-05-26 ENCOUNTER — HOME CARE VISIT (OUTPATIENT)
Dept: SCHEDULING | Facility: HOME HEALTH | Age: 67
End: 2022-05-26
Payer: MEDICARE

## 2022-05-26 ENCOUNTER — HOME CARE VISIT (OUTPATIENT)
Dept: HOME HEALTH SERVICES | Facility: HOME HEALTH | Age: 67
End: 2022-05-26
Payer: MEDICARE

## 2022-05-27 VITALS
OXYGEN SATURATION: 97 % | DIASTOLIC BLOOD PRESSURE: 70 MMHG | HEART RATE: 99 BPM | TEMPERATURE: 97.6 F | WEIGHT: 208 LBS | SYSTOLIC BLOOD PRESSURE: 140 MMHG | RESPIRATION RATE: 18 BRPM | BODY MASS INDEX: 34.61 KG/M2

## 2022-05-31 ENCOUNTER — HOME CARE VISIT (OUTPATIENT)
Dept: SCHEDULING | Facility: HOME HEALTH | Age: 67
End: 2022-05-31
Payer: MEDICARE

## 2022-06-01 ENCOUNTER — HOME CARE VISIT (OUTPATIENT)
Dept: SCHEDULING | Facility: HOME HEALTH | Age: 67
End: 2022-06-01
Payer: MEDICARE

## 2022-06-01 PROCEDURE — G0152 HHCP-SERV OF OT,EA 15 MIN: HCPCS

## 2022-06-02 ENCOUNTER — HOME CARE VISIT (OUTPATIENT)
Dept: HOME HEALTH SERVICES | Facility: HOME HEALTH | Age: 67
End: 2022-06-02
Payer: MEDICARE

## 2022-06-03 ENCOUNTER — HOME CARE VISIT (OUTPATIENT)
Dept: HOME HEALTH SERVICES | Facility: HOME HEALTH | Age: 67
End: 2022-06-03
Payer: MEDICARE

## 2022-06-06 VITALS
BODY MASS INDEX: 33.27 KG/M2 | SYSTOLIC BLOOD PRESSURE: 128 MMHG | WEIGHT: 199.9 LBS | TEMPERATURE: 97.8 F | OXYGEN SATURATION: 96 % | DIASTOLIC BLOOD PRESSURE: 80 MMHG | HEART RATE: 106 BPM

## 2022-06-07 ENCOUNTER — HOME CARE VISIT (OUTPATIENT)
Dept: HOME HEALTH SERVICES | Facility: HOME HEALTH | Age: 67
End: 2022-06-07
Payer: MEDICARE

## 2022-06-08 ENCOUNTER — HOME CARE VISIT (OUTPATIENT)
Dept: SCHEDULING | Facility: HOME HEALTH | Age: 67
End: 2022-06-08
Payer: MEDICARE

## 2022-06-09 ENCOUNTER — HOME CARE VISIT (OUTPATIENT)
Dept: HOME HEALTH SERVICES | Facility: HOME HEALTH | Age: 67
End: 2022-06-09
Payer: MEDICARE

## 2022-06-10 ENCOUNTER — HOME CARE VISIT (OUTPATIENT)
Dept: SCHEDULING | Facility: HOME HEALTH | Age: 67
End: 2022-06-10
Payer: MEDICARE

## 2022-06-13 ENCOUNTER — HOME CARE VISIT (OUTPATIENT)
Dept: HOME HEALTH SERVICES | Facility: HOME HEALTH | Age: 67
End: 2022-06-13
Payer: MEDICARE

## 2022-06-15 ENCOUNTER — HOME CARE VISIT (OUTPATIENT)
Dept: SCHEDULING | Facility: HOME HEALTH | Age: 67
End: 2022-06-15
Payer: MEDICARE

## 2022-06-17 ENCOUNTER — HOME CARE VISIT (OUTPATIENT)
Dept: SCHEDULING | Facility: HOME HEALTH | Age: 67
End: 2022-06-17
Payer: MEDICARE

## 2022-06-17 ENCOUNTER — HOME CARE VISIT (OUTPATIENT)
Dept: HOME HEALTH SERVICES | Facility: HOME HEALTH | Age: 67
End: 2022-06-17
Payer: MEDICARE

## 2022-06-17 PROCEDURE — G0152 HHCP-SERV OF OT,EA 15 MIN: HCPCS

## 2022-06-17 PROCEDURE — 400013 HH SOC

## 2022-06-21 VITALS
SYSTOLIC BLOOD PRESSURE: 120 MMHG | TEMPERATURE: 98.7 F | DIASTOLIC BLOOD PRESSURE: 60 MMHG | OXYGEN SATURATION: 94 % | WEIGHT: 202.3 LBS | HEART RATE: 96 BPM | BODY MASS INDEX: 33.66 KG/M2

## 2022-06-22 ENCOUNTER — TRANSCRIBE ORDER (OUTPATIENT)
Dept: REGISTRATION | Age: 67
End: 2022-06-22

## 2022-06-22 ENCOUNTER — HOSPITAL ENCOUNTER (OUTPATIENT)
Dept: GENERAL RADIOLOGY | Age: 67
Discharge: HOME | End: 2022-06-22
Payer: MEDICARE

## 2022-06-22 DIAGNOSIS — T85.691A MECHANICAL COMPLICATION DUE TO PERITONEAL DIALYSIS CATHETER (HCC): ICD-10-CM

## 2022-06-22 DIAGNOSIS — N18.6 END STAGE RENAL DISEASE (HCC): ICD-10-CM

## 2022-06-22 DIAGNOSIS — D63.1 ANEMIA OF CHRONIC RENAL FAILURE: ICD-10-CM

## 2022-06-22 DIAGNOSIS — N18.6 END STAGE RENAL DISEASE (HCC): Primary | ICD-10-CM

## 2022-06-22 DIAGNOSIS — R10.0 ACUTE ABDOMINAL PAIN SYNDROME: ICD-10-CM

## 2022-06-22 DIAGNOSIS — N18.9 ANEMIA OF CHRONIC RENAL FAILURE: ICD-10-CM

## 2022-06-22 PROCEDURE — 74018 RADEX ABDOMEN 1 VIEW: CPT

## 2022-06-29 LAB — HBA1C MFR BLD HPLC: 8.6 %

## 2022-10-19 ENCOUNTER — HOSPITAL ENCOUNTER (INPATIENT)
Age: 67
LOS: 6 days | Discharge: HOME | DRG: 291 | End: 2022-10-25
Attending: EMERGENCY MEDICINE | Admitting: FAMILY MEDICINE
Payer: MEDICARE

## 2022-10-19 ENCOUNTER — APPOINTMENT (OUTPATIENT)
Dept: GENERAL RADIOLOGY | Age: 67
DRG: 291 | End: 2022-10-19
Attending: STUDENT IN AN ORGANIZED HEALTH CARE EDUCATION/TRAINING PROGRAM
Payer: MEDICARE

## 2022-10-19 DIAGNOSIS — M54.2 NECK PAIN ON LEFT SIDE: ICD-10-CM

## 2022-10-19 DIAGNOSIS — D64.9 SYMPTOMATIC ANEMIA: Primary | ICD-10-CM

## 2022-10-19 DIAGNOSIS — R06.02 SOB (SHORTNESS OF BREATH): ICD-10-CM

## 2022-10-19 DIAGNOSIS — N18.6 ESRD ON PERITONEAL DIALYSIS (HCC): ICD-10-CM

## 2022-10-19 DIAGNOSIS — Z99.2 ESRD ON PERITONEAL DIALYSIS (HCC): ICD-10-CM

## 2022-10-19 DIAGNOSIS — R60.9 EDEMA, UNSPECIFIED TYPE: ICD-10-CM

## 2022-10-19 LAB
ALBUMIN SERPL-MCNC: 2.9 G/DL (ref 3.5–5)
ALBUMIN/GLOB SERPL: 0.6 (ref 1.1–2.2)
ALP SERPL-CCNC: 98 U/L (ref 45–117)
ALT SERPL-CCNC: 11 U/L (ref 12–78)
ANION GAP SERPL CALC-SCNC: 8 MMOL/L (ref 5–15)
AST SERPL-CCNC: 8 U/L (ref 15–37)
ATRIAL RATE: 99 BPM
BASOPHILS # BLD: 0.1 K/UL (ref 0–0.1)
BASOPHILS NFR BLD: 1 % (ref 0–1)
BILIRUB SERPL-MCNC: 0.3 MG/DL (ref 0.2–1)
BNP SERPL-MCNC: 8161 PG/ML
BUN SERPL-MCNC: 28 MG/DL (ref 6–20)
BUN/CREAT SERPL: 4 (ref 12–20)
CALCIUM SERPL-MCNC: 7.5 MG/DL (ref 8.5–10.1)
CALCULATED P AXIS, ECG09: 70 DEGREES
CALCULATED R AXIS, ECG10: 35 DEGREES
CALCULATED T AXIS, ECG11: 120 DEGREES
CHLORIDE SERPL-SCNC: 103 MMOL/L (ref 97–108)
CO2 SERPL-SCNC: 27 MMOL/L (ref 21–32)
COMMENT, HOLDF: NORMAL
CREAT SERPL-MCNC: 6.52 MG/DL (ref 0.55–1.02)
DIAGNOSIS, 93000: NORMAL
DIFFERENTIAL METHOD BLD: ABNORMAL
EOSINOPHIL # BLD: 0.3 K/UL (ref 0–0.4)
EOSINOPHIL NFR BLD: 4 % (ref 0–7)
ERYTHROCYTE [DISTWIDTH] IN BLOOD BY AUTOMATED COUNT: 16.3 % (ref 11.5–14.5)
GLOBULIN SER CALC-MCNC: 5.2 G/DL (ref 2–4)
GLUCOSE BLD STRIP.AUTO-MCNC: 194 MG/DL (ref 65–117)
GLUCOSE SERPL-MCNC: 246 MG/DL (ref 65–100)
HCT VFR BLD AUTO: 21.7 % (ref 35–47)
HGB BLD-MCNC: 6.6 G/DL (ref 11.5–16)
HISTORY CHECKED?,CKHIST: NORMAL
IMM GRANULOCYTES # BLD AUTO: 0 K/UL (ref 0–0.04)
IMM GRANULOCYTES NFR BLD AUTO: 0 % (ref 0–0.5)
LYMPHOCYTES # BLD: 1.8 K/UL (ref 0.8–3.5)
LYMPHOCYTES NFR BLD: 23 % (ref 12–49)
MCH RBC QN AUTO: 30.7 PG (ref 26–34)
MCHC RBC AUTO-ENTMCNC: 30.4 G/DL (ref 30–36.5)
MCV RBC AUTO: 100.9 FL (ref 80–99)
MONOCYTES # BLD: 0.6 K/UL (ref 0–1)
MONOCYTES NFR BLD: 7 % (ref 5–13)
NEUTS SEG # BLD: 5.2 K/UL (ref 1.8–8)
NEUTS SEG NFR BLD: 65 % (ref 32–75)
NRBC # BLD: 0 K/UL (ref 0–0.01)
NRBC BLD-RTO: 0 PER 100 WBC
P-R INTERVAL, ECG05: 148 MS
PLATELET # BLD AUTO: 253 K/UL (ref 150–400)
PMV BLD AUTO: 10.4 FL (ref 8.9–12.9)
POTASSIUM SERPL-SCNC: 3.8 MMOL/L (ref 3.5–5.1)
PROT SERPL-MCNC: 8.1 G/DL (ref 6.4–8.2)
Q-T INTERVAL, ECG07: 418 MS
QRS DURATION, ECG06: 86 MS
QTC CALCULATION (BEZET), ECG08: 536 MS
RBC # BLD AUTO: 2.15 M/UL (ref 3.8–5.2)
RBC MORPH BLD: ABNORMAL
RBC MORPH BLD: ABNORMAL
SAMPLES BEING HELD,HOLD: NORMAL
SERVICE CMNT-IMP: ABNORMAL
SODIUM SERPL-SCNC: 138 MMOL/L (ref 136–145)
TROPONIN-HIGH SENSITIVITY: 64 NG/L (ref 0–51)
TROPONIN-HIGH SENSITIVITY: 72 NG/L (ref 0–51)
VENTRICULAR RATE, ECG03: 99 BPM
WBC # BLD AUTO: 8 K/UL (ref 3.6–11)

## 2022-10-19 PROCEDURE — 85025 COMPLETE CBC W/AUTO DIFF WBC: CPT

## 2022-10-19 PROCEDURE — 80053 COMPREHEN METABOLIC PANEL: CPT

## 2022-10-19 PROCEDURE — 36430 TRANSFUSION BLD/BLD COMPNT: CPT

## 2022-10-19 PROCEDURE — 82728 ASSAY OF FERRITIN: CPT

## 2022-10-19 PROCEDURE — 86923 COMPATIBILITY TEST ELECTRIC: CPT

## 2022-10-19 PROCEDURE — 71046 X-RAY EXAM CHEST 2 VIEWS: CPT

## 2022-10-19 PROCEDURE — 36415 COLL VENOUS BLD VENIPUNCTURE: CPT

## 2022-10-19 PROCEDURE — 74011250637 HC RX REV CODE- 250/637: Performed by: EMERGENCY MEDICINE

## 2022-10-19 PROCEDURE — 93005 ELECTROCARDIOGRAM TRACING: CPT

## 2022-10-19 PROCEDURE — 86900 BLOOD TYPING SEROLOGIC ABO: CPT

## 2022-10-19 PROCEDURE — 82746 ASSAY OF FOLIC ACID SERUM: CPT

## 2022-10-19 PROCEDURE — 82962 GLUCOSE BLOOD TEST: CPT

## 2022-10-19 PROCEDURE — 65270000029 HC RM PRIVATE

## 2022-10-19 PROCEDURE — 84484 ASSAY OF TROPONIN QUANT: CPT

## 2022-10-19 PROCEDURE — 99285 EMERGENCY DEPT VISIT HI MDM: CPT

## 2022-10-19 PROCEDURE — 82607 VITAMIN B-12: CPT

## 2022-10-19 PROCEDURE — 85045 AUTOMATED RETICULOCYTE COUNT: CPT

## 2022-10-19 PROCEDURE — 83880 ASSAY OF NATRIURETIC PEPTIDE: CPT

## 2022-10-19 PROCEDURE — 84443 ASSAY THYROID STIM HORMONE: CPT

## 2022-10-19 PROCEDURE — P9016 RBC LEUKOCYTES REDUCED: HCPCS

## 2022-10-19 PROCEDURE — 30233N1 TRANSFUSION OF NONAUTOLOGOUS RED BLOOD CELLS INTO PERIPHERAL VEIN, PERCUTANEOUS APPROACH: ICD-10-PCS | Performed by: HOSPITALIST

## 2022-10-19 PROCEDURE — 83540 ASSAY OF IRON: CPT

## 2022-10-19 RX ORDER — SODIUM CHLORIDE 9 MG/ML
250 INJECTION, SOLUTION INTRAVENOUS AS NEEDED
Status: DISCONTINUED | OUTPATIENT
Start: 2022-10-19 | End: 2022-10-25 | Stop reason: HOSPADM

## 2022-10-19 RX ORDER — OXYCODONE AND ACETAMINOPHEN 5; 325 MG/1; MG/1
1 TABLET ORAL
Status: COMPLETED | OUTPATIENT
Start: 2022-10-19 | End: 2022-10-19

## 2022-10-19 RX ADMIN — OXYCODONE AND ACETAMINOPHEN 1 TABLET: 5; 325 TABLET ORAL at 20:42

## 2022-10-19 NOTE — ED PROVIDER NOTES
Pain from L side of neck \"all the way down. \"  Getting worse over past 2 days. Generalized weakness.  + SOB. No cough, fever, vomiting, diarrhea. No pain meds taken. Does home peritoneal dialysis. Denies chest pain or abd pain.          Past Medical History:   Diagnosis Date    Asthma     CAD (coronary artery disease)     CHF (congestive heart failure) (Formerly [facility])     Chronic kidney disease     HD M-- Radu Menon [phone]    COPD (chronic obstructive pulmonary disease) (Formerly [facility])     Diabetes (Formerly [facility])     DM2    GERD (gastroesophageal reflux disease)     Hypertension        Past Surgical History:   Procedure Laterality Date    DELIVERY       HX CORONARY STENT PLACEMENT      HX HYSTERECTOMY      IR INSERT NON TUNL CVC OVER 5 YRS  12/3/2021    IR INSERT TUNL CVC W/O PORT OVER 5 YR  2021    DC CARDIAC SURG PROCEDURE UNLIST      VASCULAR SURGERY PROCEDURE UNLIST      leg stent         Family History:   Problem Relation Age of Onset    Heart Disease Mother     Cancer Father        Social History     Socioeconomic History    Marital status: SINGLE     Spouse name: Not on file    Number of children: Not on file    Years of education: Not on file    Highest education level: Not on file   Occupational History    Not on file   Tobacco Use    Smoking status: Former     Packs/day: 0.25     Years: 10.00     Pack years: 2.50     Types: Cigarettes     Quit date: 2021     Years since quittin.9    Smokeless tobacco: Never   Vaping Use    Vaping Use: Never used   Substance and Sexual Activity    Alcohol use: No    Drug use: Never    Sexual activity: Not on file   Other Topics Concern    Not on file   Social History Narrative    Not on file     Social Determinants of Health     Financial Resource Strain: Not on file   Food Insecurity: Not on file   Transportation Needs: Not on file   Physical Activity: Not on file   Stress: Not on file   Social Connections: Not on file   Intimate Partner Violence: Not on file Housing Stability: Not on file         ALLERGIES: Ivp dye [fd and c blue no. 1]    Review of Systems   Constitutional:  Negative for fever. HENT:  Negative for facial swelling. Eyes:  Negative for visual disturbance. Respiratory:  Negative for chest tightness. Cardiovascular:  Negative for chest pain. Gastrointestinal:  Negative for abdominal pain. Genitourinary:  Negative for difficulty urinating and dysuria. Musculoskeletal:  Negative for arthralgias. Skin:  Negative for rash. Neurological:  Negative for headaches. Hematological:  Negative for adenopathy. Psychiatric/Behavioral:  Negative for suicidal ideas. Vitals:    10/19/22 1307 10/19/22 1752   BP: 134/81 (!) 165/86   Pulse: 97 97   Resp: 22 18   Temp: 98.1 °F (36.7 °C) 98.2 °F (36.8 °C)   SpO2: 98% 99%            Physical Exam  Vitals and nursing note reviewed. Constitutional:       General: She is not in acute distress. Appearance: She is well-developed. HENT:      Head: Normocephalic and atraumatic. Eyes:      General: No scleral icterus. Conjunctiva/sclera: Conjunctivae normal.      Pupils: Pupils are equal, round, and reactive to light. Cardiovascular:      Rate and Rhythm: Normal rate. Heart sounds: No murmur heard. Pulmonary:      Effort: Pulmonary effort is normal. No respiratory distress. Abdominal:      General: There is no distension. Musculoskeletal:         General: Normal range of motion. Cervical back: Normal range of motion and neck supple. Skin:     General: Skin is warm and dry. Findings: No rash. Neurological:      Mental Status: She is alert and oriented to person, place, and time.         MDM     Amount and/or Complexity of Data Reviewed  Clinical lab tests: reviewed  Tests in the radiology section of CPT®: reviewed  Tests in the medicine section of CPT®: reviewed      ED Course as of 10/19/22 2016   Wed Oct 19, 2022   1317 EKG time 1:13 PM  Normal sinus rhythm rate of 99 with normal axis, narrow QRS, nonspecific ST-T wave changes without ST elevations or depressions [CC]      ED Course User Index  [CC] Shabbir Miller DO [employer] Consult for Admission  8:11 PM    ED Room Number: ERWT/WT  Patient Name and age:  Marv Garcia 75 y.o.  female  Working Diagnosis:   1. Symptomatic anemia    2. Edema, unspecified type    3. SOB (shortness of breath)    4. ESRD on peritoneal dialysis ([address]    5. Neck pain on left side        COVID-19 Suspicion:  no  Sepsis present:  no  Reassessment needed: no  Code Status:  Full Code  Readmission: no  Isolation Requirements:  no  Recommended Level of Care:  telemetry  Department: [facility] Adult ED - [phone]  Admitting Provider:     Other:  Pt does peritoneal dialysis at night. Hgb=6.6. No obvious bleeding. Pain to L side of neck. Generalized weakness and sob. Total critical care time spent exclusive of procedures:  35 minutes. Procedures    I have discussed with the patient the rationale for blood component transfusion; its benefits in treating or preventing fatigue, organ damage, or death; and its risk which includes mild transfusion reactions, rare risk of blood borne infection, or more serious but rare reactions. I have discussed the alternatives to transfusion, including the risk and consequences of not receiving transfusion. The patient had an opportunity to ask questions and had agreed to proceed with transfusion of blood components.

## 2022-10-19 NOTE — ED TRIAGE NOTES
Pt c/o LEFT sided chest pain that radiates into LEFT neck and down LEFT side with SOB. Denies injury.

## 2022-10-20 LAB
ABO + RH BLD: NORMAL
ALBUMIN SERPL-MCNC: 2.8 G/DL (ref 3.5–5)
ANION GAP SERPL CALC-SCNC: 9 MMOL/L (ref 5–15)
BASOPHILS # BLD: 0.1 K/UL (ref 0–0.1)
BASOPHILS NFR BLD: 1 % (ref 0–1)
BLD PROD TYP BPU: NORMAL
BLOOD GROUP ANTIBODIES SERPL: NORMAL
BPU ID: NORMAL
BUN SERPL-MCNC: 29 MG/DL (ref 6–20)
BUN/CREAT SERPL: 5 (ref 12–20)
CALCIUM SERPL-MCNC: 7.6 MG/DL (ref 8.5–10.1)
CHLORIDE SERPL-SCNC: 105 MMOL/L (ref 97–108)
CO2 SERPL-SCNC: 27 MMOL/L (ref 21–32)
CREAT SERPL-MCNC: 6.27 MG/DL (ref 0.55–1.02)
CROSSMATCH RESULT,%XM: NORMAL
DIFFERENTIAL METHOD BLD: ABNORMAL
EOSINOPHIL # BLD: 0.3 K/UL (ref 0–0.4)
EOSINOPHIL NFR BLD: 5 % (ref 0–7)
ERYTHROCYTE [DISTWIDTH] IN BLOOD BY AUTOMATED COUNT: 17.1 % (ref 11.5–14.5)
EST. AVERAGE GLUCOSE BLD GHB EST-MCNC: 194 MG/DL
FERRITIN SERPL-MCNC: 811 NG/ML (ref 26–388)
FOLATE SERPL-MCNC: 5.2 NG/ML (ref 5–21)
GLUCOSE BLD STRIP.AUTO-MCNC: 187 MG/DL (ref 65–117)
GLUCOSE BLD STRIP.AUTO-MCNC: 197 MG/DL (ref 65–117)
GLUCOSE BLD STRIP.AUTO-MCNC: 198 MG/DL (ref 65–117)
GLUCOSE BLD STRIP.AUTO-MCNC: 251 MG/DL (ref 65–117)
GLUCOSE BLD STRIP.AUTO-MCNC: 310 MG/DL (ref 65–117)
GLUCOSE SERPL-MCNC: 212 MG/DL (ref 65–100)
HBA1C MFR BLD: 8.4 % (ref 4–5.6)
HBV SURFACE AB SER QL: NONREACTIVE
HBV SURFACE AB SER-ACNC: <3.1 MIU/ML
HBV SURFACE AG SER QL: <0.1 INDEX
HBV SURFACE AG SER QL: NEGATIVE
HCT VFR BLD AUTO: 24.2 % (ref 35–47)
HGB BLD-MCNC: 7.6 G/DL (ref 11.5–16)
IMM GRANULOCYTES # BLD AUTO: 0 K/UL (ref 0–0.04)
IMM GRANULOCYTES NFR BLD AUTO: 0 % (ref 0–0.5)
IRON SATN MFR SERPL: 18 % (ref 20–50)
IRON SERPL-MCNC: 35 UG/DL (ref 35–150)
LYMPHOCYTES # BLD: 1.9 K/UL (ref 0.8–3.5)
LYMPHOCYTES NFR BLD: 27 % (ref 12–49)
MCH RBC QN AUTO: 30.6 PG (ref 26–34)
MCHC RBC AUTO-ENTMCNC: 31.4 G/DL (ref 30–36.5)
MCV RBC AUTO: 97.6 FL (ref 80–99)
MONOCYTES # BLD: 0.6 K/UL (ref 0–1)
MONOCYTES NFR BLD: 9 % (ref 5–13)
NEUTS SEG # BLD: 4.2 K/UL (ref 1.8–8)
NEUTS SEG NFR BLD: 58 % (ref 32–75)
NRBC # BLD: 0.02 K/UL (ref 0–0.01)
NRBC BLD-RTO: 0.3 PER 100 WBC
PHOSPHATE SERPL-MCNC: 4.3 MG/DL (ref 2.6–4.7)
PLATELET # BLD AUTO: 236 K/UL (ref 150–400)
PMV BLD AUTO: 9.6 FL (ref 8.9–12.9)
POTASSIUM SERPL-SCNC: 3.4 MMOL/L (ref 3.5–5.1)
RBC # BLD AUTO: 2.48 M/UL (ref 3.8–5.2)
RETICS # AUTO: 0.09 M/UL (ref 0.02–0.08)
RETICS/RBC NFR AUTO: 4.3 % (ref 0.7–2.1)
SERVICE CMNT-IMP: ABNORMAL
SODIUM SERPL-SCNC: 141 MMOL/L (ref 136–145)
SPECIMEN EXP DATE BLD: NORMAL
STATUS OF UNIT,%ST: NORMAL
TIBC SERPL-MCNC: 193 UG/DL (ref 250–450)
TROPONIN-HIGH SENSITIVITY: 78 NG/L (ref 0–51)
TSH SERPL DL<=0.05 MIU/L-ACNC: 3.01 UIU/ML (ref 0.36–3.74)
UNIT DIVISION, %UDIV: 0
VIT B12 SERPL-MCNC: 479 PG/ML (ref 193–986)
WBC # BLD AUTO: 7.1 K/UL (ref 3.6–11)

## 2022-10-20 PROCEDURE — 74011250637 HC RX REV CODE- 250/637: Performed by: NURSE PRACTITIONER

## 2022-10-20 PROCEDURE — 80069 RENAL FUNCTION PANEL: CPT

## 2022-10-20 PROCEDURE — 3E1M39Z IRRIGATION OF PERITONEAL CAVITY USING DIALYSATE, PERCUTANEOUS APPROACH: ICD-10-PCS | Performed by: NURSE PRACTITIONER

## 2022-10-20 PROCEDURE — 84484 ASSAY OF TROPONIN QUANT: CPT

## 2022-10-20 PROCEDURE — 82962 GLUCOSE BLOOD TEST: CPT

## 2022-10-20 PROCEDURE — 86706 HEP B SURFACE ANTIBODY: CPT

## 2022-10-20 PROCEDURE — 65270000046 HC RM TELEMETRY

## 2022-10-20 PROCEDURE — 83036 HEMOGLOBIN GLYCOSYLATED A1C: CPT

## 2022-10-20 PROCEDURE — A4726 DIALYS SOL FLD VOL > 5999CC: HCPCS | Performed by: NURSE PRACTITIONER

## 2022-10-20 PROCEDURE — 90945 DIALYSIS ONE EVALUATION: CPT

## 2022-10-20 PROCEDURE — 74011250636 HC RX REV CODE- 250/636: Performed by: NURSE PRACTITIONER

## 2022-10-20 PROCEDURE — 36415 COLL VENOUS BLD VENIPUNCTURE: CPT

## 2022-10-20 PROCEDURE — 74011000250 HC RX REV CODE- 250: Performed by: FAMILY MEDICINE

## 2022-10-20 PROCEDURE — 85025 COMPLETE CBC W/AUTO DIFF WBC: CPT

## 2022-10-20 PROCEDURE — 74011636637 HC RX REV CODE- 636/637: Performed by: FAMILY MEDICINE

## 2022-10-20 PROCEDURE — 87340 HEPATITIS B SURFACE AG IA: CPT

## 2022-10-20 PROCEDURE — 74011250637 HC RX REV CODE- 250/637: Performed by: FAMILY MEDICINE

## 2022-10-20 PROCEDURE — 74011250637 HC RX REV CODE- 250/637: Performed by: INTERNAL MEDICINE

## 2022-10-20 RX ORDER — IPRATROPIUM BROMIDE AND ALBUTEROL SULFATE 2.5; .5 MG/3ML; MG/3ML
3 SOLUTION RESPIRATORY (INHALATION)
Status: DISCONTINUED | OUTPATIENT
Start: 2022-10-20 | End: 2022-10-25 | Stop reason: HOSPADM

## 2022-10-20 RX ORDER — ASPIRIN 325 MG
325 TABLET ORAL DAILY
Status: DISCONTINUED | OUTPATIENT
Start: 2022-10-20 | End: 2022-10-25 | Stop reason: HOSPADM

## 2022-10-20 RX ORDER — ONDANSETRON 4 MG/1
4 TABLET, ORALLY DISINTEGRATING ORAL
Status: DISCONTINUED | OUTPATIENT
Start: 2022-10-19 | End: 2022-10-20

## 2022-10-20 RX ORDER — OXYCODONE AND ACETAMINOPHEN 5; 325 MG/1; MG/1
1 TABLET ORAL
Status: DISCONTINUED | OUTPATIENT
Start: 2022-10-20 | End: 2022-10-25 | Stop reason: HOSPADM

## 2022-10-20 RX ORDER — POTASSIUM CHLORIDE 750 MG/1
40 TABLET, FILM COATED, EXTENDED RELEASE ORAL
Status: COMPLETED | OUTPATIENT
Start: 2022-10-20 | End: 2022-10-20

## 2022-10-20 RX ORDER — HYDRALAZINE HYDROCHLORIDE 50 MG/1
50 TABLET, FILM COATED ORAL 3 TIMES DAILY
Status: DISCONTINUED | OUTPATIENT
Start: 2022-10-20 | End: 2022-10-25 | Stop reason: HOSPADM

## 2022-10-20 RX ORDER — ONDANSETRON 2 MG/ML
4 INJECTION INTRAMUSCULAR; INTRAVENOUS
Status: DISCONTINUED | OUTPATIENT
Start: 2022-10-19 | End: 2022-10-20

## 2022-10-20 RX ORDER — INSULIN LISPRO 100 [IU]/ML
INJECTION, SOLUTION INTRAVENOUS; SUBCUTANEOUS
Status: DISCONTINUED | OUTPATIENT
Start: 2022-10-20 | End: 2022-10-25 | Stop reason: HOSPADM

## 2022-10-20 RX ORDER — IBUPROFEN 200 MG
4 TABLET ORAL AS NEEDED
Status: DISCONTINUED | OUTPATIENT
Start: 2022-10-20 | End: 2022-10-25 | Stop reason: HOSPADM

## 2022-10-20 RX ORDER — SODIUM CHLORIDE 0.9 % (FLUSH) 0.9 %
5-40 SYRINGE (ML) INJECTION AS NEEDED
Status: DISCONTINUED | OUTPATIENT
Start: 2022-10-19 | End: 2022-10-25 | Stop reason: HOSPADM

## 2022-10-20 RX ORDER — ACETAMINOPHEN 325 MG/1
650 TABLET ORAL
Status: DISCONTINUED | OUTPATIENT
Start: 2022-10-19 | End: 2022-10-25 | Stop reason: HOSPADM

## 2022-10-20 RX ORDER — GENTAMICIN SULFATE 1 MG/G
OINTMENT TOPICAL DAILY
Status: DISCONTINUED | OUTPATIENT
Start: 2022-10-20 | End: 2022-10-25 | Stop reason: HOSPADM

## 2022-10-20 RX ORDER — MONTELUKAST SODIUM 10 MG/1
10 TABLET ORAL
Status: DISCONTINUED | OUTPATIENT
Start: 2022-10-20 | End: 2022-10-25 | Stop reason: HOSPADM

## 2022-10-20 RX ORDER — INSULIN LISPRO 100 [IU]/ML
INJECTION, SOLUTION INTRAVENOUS; SUBCUTANEOUS
Status: DISCONTINUED | OUTPATIENT
Start: 2022-10-20 | End: 2022-10-20

## 2022-10-20 RX ORDER — GENTAMICIN SULFATE 1 MG/G
CREAM TOPICAL DAILY PRN
Status: DISCONTINUED | OUTPATIENT
Start: 2022-10-20 | End: 2022-10-20

## 2022-10-20 RX ORDER — ROSUVASTATIN CALCIUM 10 MG/1
10 TABLET, COATED ORAL
Status: DISCONTINUED | OUTPATIENT
Start: 2022-10-20 | End: 2022-10-25 | Stop reason: HOSPADM

## 2022-10-20 RX ORDER — FENTANYL CITRATE 50 UG/ML
25 INJECTION, SOLUTION INTRAMUSCULAR; INTRAVENOUS ONCE
Status: ACTIVE | OUTPATIENT
Start: 2022-10-20 | End: 2022-10-20

## 2022-10-20 RX ORDER — CARVEDILOL 12.5 MG/1
25 TABLET ORAL 2 TIMES DAILY WITH MEALS
Status: DISCONTINUED | OUTPATIENT
Start: 2022-10-20 | End: 2022-10-25 | Stop reason: HOSPADM

## 2022-10-20 RX ORDER — SODIUM CHLORIDE 0.9 % (FLUSH) 0.9 %
5-40 SYRINGE (ML) INJECTION EVERY 8 HOURS
Status: DISCONTINUED | OUTPATIENT
Start: 2022-10-20 | End: 2022-10-25 | Stop reason: HOSPADM

## 2022-10-20 RX ORDER — ACETAMINOPHEN 650 MG/1
650 SUPPOSITORY RECTAL
Status: DISCONTINUED | OUTPATIENT
Start: 2022-10-19 | End: 2022-10-25 | Stop reason: HOSPADM

## 2022-10-20 RX ADMIN — OXYCODONE AND ACETAMINOPHEN 1 TABLET: 5; 325 TABLET ORAL at 05:34

## 2022-10-20 RX ADMIN — CARVEDILOL 25 MG: 12.5 TABLET, FILM COATED ORAL at 11:50

## 2022-10-20 RX ADMIN — MONTELUKAST 10 MG: 10 TABLET, FILM COATED ORAL at 23:15

## 2022-10-20 RX ADMIN — SODIUM CHLORIDE, SODIUM LACTATE, CALCIUM CHLORIDE, MAGNESIUM CHLORIDE AND DEXTROSE 6000 ML: 2.5; 538; 448; 18.3; 5.08 INJECTION, SOLUTION INTRAPERITONEAL at 15:39

## 2022-10-20 RX ADMIN — OXYCODONE AND ACETAMINOPHEN 1 TABLET: 5; 325 TABLET ORAL at 12:59

## 2022-10-20 RX ADMIN — POTASSIUM CHLORIDE 40 MEQ: 750 TABLET, FILM COATED, EXTENDED RELEASE ORAL at 11:50

## 2022-10-20 RX ADMIN — SODIUM CHLORIDE, PRESERVATIVE FREE 10 ML: 5 INJECTION INTRAVENOUS at 03:06

## 2022-10-20 RX ADMIN — GENTAMICIN SULFATE: 1 CREAM TOPICAL at 02:23

## 2022-10-20 RX ADMIN — SODIUM CHLORIDE, SODIUM LACTATE, CALCIUM CHLORIDE, MAGNESIUM CHLORIDE AND DEXTROSE 6000 ML: 2.5; 538; 448; 18.3; 5.08 INJECTION, SOLUTION INTRAPERITONEAL at 02:45

## 2022-10-20 RX ADMIN — Medication 7 UNITS: at 12:59

## 2022-10-20 RX ADMIN — Medication 197 UNITS: at 18:13

## 2022-10-20 RX ADMIN — ASPIRIN 325 MG ORAL TABLET 325 MG: 325 PILL ORAL at 11:47

## 2022-10-20 RX ADMIN — OXYCODONE AND ACETAMINOPHEN 1 TABLET: 5; 325 TABLET ORAL at 19:37

## 2022-10-20 RX ADMIN — ROSUVASTATIN 10 MG: 10 TABLET, FILM COATED ORAL at 23:15

## 2022-10-20 RX ADMIN — SODIUM CHLORIDE, PRESERVATIVE FREE 10 ML: 5 INJECTION INTRAVENOUS at 23:16

## 2022-10-20 RX ADMIN — HYDRALAZINE HYDROCHLORIDE 50 MG: 50 TABLET, FILM COATED ORAL at 11:50

## 2022-10-20 RX ADMIN — SODIUM CHLORIDE, PRESERVATIVE FREE 10 ML: 5 INJECTION INTRAVENOUS at 05:30

## 2022-10-20 RX ADMIN — SODIUM CHLORIDE, PRESERVATIVE FREE 10 ML: 5 INJECTION INTRAVENOUS at 18:16

## 2022-10-20 RX ADMIN — HYDRALAZINE HYDROCHLORIDE 50 MG: 50 TABLET, FILM COATED ORAL at 23:15

## 2022-10-20 RX ADMIN — CARVEDILOL 25 MG: 12.5 TABLET, FILM COATED ORAL at 18:13

## 2022-10-20 RX ADMIN — HYDRALAZINE HYDROCHLORIDE 50 MG: 50 TABLET, FILM COATED ORAL at 18:13

## 2022-10-20 NOTE — DIALYSIS
Peritoneal Dialysis Initiation / [phone]           Orders   Therapy Time: 9 hrs   Cycles: 4   Fill Volume: 2,000 mls   Last Fill Volume: 0 mls    Total Volume: 8,000 mls   Solution: (2) 2.5% Dextrose Dianeal bags            Access   Type & Location: Left ABD PD Catheter   Comments:   Access site good, no drainage. Pt has no pain to palpation nor reports discomfort. Access site care performed per hospital P/P - Cleansed with ExSept, Gentamicin cream applied & dry gauze dressing changed, dated 10/20/22. Labs   HBsAg (Antigen) / date: Pending      HBsAb (Antibody) / date: Pending   Source: [facility]            Safety:   Time Out Done:    9344   Consent obtained/signed: On file   Education: Access / Site Care   Primary Nurse Rpt: LIU Mcclure RN      Comments: At bedside to initiate CCPD tx for the night. Orders, notes, labs, code status reviewed. Cycler settings programmed & verified. Cycler primed & tested. Prior to aseptic connection transfer set scrub/soak performed w/ alcavis for two minutes. Remained present during initial drain followed by initiation of first fill. Prior to departure, bed in lowest position, call bell and personal belongings within reach. Lines visible, secure, and connections fastened well. Education and pre/post report given to pt & primary RN.       Start time: [phone]  10/20/22  Estimated End Time:  1759  10/20/22

## 2022-10-20 NOTE — PROGRESS NOTES
TRANSFER - IN REPORT:    Verbal report received from Allison Hobbs RN on [employer]  being received from Emergency Department for routine progression of care      Report consisted of patients Situation, Background, Assessment and   Recommendations(SBAR). Information from the following report(s) SBAR, ED Summary, MAR, and Cardiac Rhythm NSR  was reviewed with the receiving nurse. Opportunity for questions and clarification was provided. Assessment completed upon patients arrival to unit and care assumed.

## 2022-10-20 NOTE — PROGRESS NOTES
Nephrology Progress Note  Galileo Tapia  Date of Admission : 10/19/2022    CC:  Follow up for ESRD       Assessment and Plan     ESRD on PD:  - CCPD pt at [address] with current Rx, all 2.5% solutions     Hypokalemia:  - oral KCl x 1 this AM     Anemia of chronic disease:  - PRBCs last night  - cont MADY MWF  - check stool for occult blood  - may need GI eval    COPD  DM2  CAD       Interval History:  Seen and examined. Received PRBCs this AM.  Hgb better. No issues with ongoing PD. No cp, sob, n/v/d reported for now. Denies blood in her stool. Current Medications: all current  Medications have been eviewed in EPIC  Review of Systems: Pertinent items are noted in HPI. Objective:  Vitals:    Vitals:    10/20/22 0348 10/20/22 0549 10/20/22 0715 10/20/22 1000   BP:   (!) 156/76    Pulse: 87 92 91 94   Resp:   14    Temp:       SpO2:   100%    Weight:       Height:         Intake and Output:  No intake/output data recorded. 10/18 1901 - 10/20 0700  In: 396.3   Out: -     Physical Examination:  General: NAD,Conversant   Neck:  Supple, no mass  Resp:  Lungs CTA B/L, no wheezing , normal respiratory effort  CV:  RRR,  no murmur or rub,trace LE edema  GI:  Soft, NT, + Bowel sounds, PD exit site clean  Neurologic:  Non focal  Psych:             AAO x 3 appropriate affect   Skin:  No Rash    []    High complexity decision making was performed  []    Patient is at high-risk of decompensation with multiple organ involvement    Lab Data Personally Reviewed: I have reviewed all the pertinent labs, microbiology data and radiology studies during assessment.     Recent Labs     10/20/22  0528 10/19/22  1337    138   K 3.4* 3.8    103   CO2 27 27   * 246*   BUN 29* 28*   CREA 6.27* 6.52*   CA 7.6* 7.5*   PHOS 4.3  --    ALB 2.8* 2.9*   ALT  --  11*     Recent Labs     10/20/22  0730 10/19/22  1337   WBC 7.1 8.0   HGB 7.6* 6.6*   HCT 24.2* 21.7*    253     No results found for: [facility]  Lab Results   Component Value Date/Time    Culture result: MIXED UROGENITAL STANLEY ISOLATED 05/17/2022 09:52 AM    Culture result: NO GROWTH 5 DAYS 05/15/2022 03:51 AM    Culture result: NO GROWTH 5 DAYS 03/15/2018 06:09 PM     Recent Results (from the past 24 hour(s))   EKG, 12 LEAD, INITIAL    Collection Time: 10/19/22  1:13 PM   Result Value Ref Range    Ventricular Rate 99 BPM    Atrial Rate 99 BPM    P-R Interval 148 ms    QRS Duration 86 ms    Q-T Interval 418 ms    QTC Calculation (Bezet) 536 ms    Calculated P Axis 70 degrees    Calculated R Axis 35 degrees    Calculated T Axis 120 degrees    Diagnosis       Normal sinus rhythm  Nonspecific ST and T wave abnormality  Prolonged QT  Abnormal ECG  When compared with ECG of 15-MAY-2022 04:03,  QRS axis shifted right  Nonspecific T wave abnormality now evident in Inferior leads  Nonspecific T wave abnormality now evident in Lateral leads  Confirmed by Speedy Ferrari MD ([id number]) on 10/19/2022 9:00:31 PM     CBC WITH AUTOMATED DIFF    Collection Time: 10/19/22  1:37 PM   Result Value Ref Range    WBC 8.0 3.6 - 11.0 K/uL    RBC 2.15 (L) 3.80 - 5.20 M/uL    HGB 6.6 (L) 11.5 - 16.0 g/dL    HCT 21.7 (L) 35.0 - 47.0 %    .9 (H) 80.0 - 99.0 FL    MCH 30.7 26.0 - 34.0 PG    MCHC 30.4 30.0 - 36.5 g/dL    RDW 16.3 (H) 11.5 - 14.5 %    PLATELET 663 398 - 716 K/uL    MPV 10.4 8.9 - 12.9 FL    NRBC 0.0 0  WBC    ABSOLUTE NRBC 0.00 0.00 - 0.01 K/uL    NEUTROPHILS 65 32 - 75 %    LYMPHOCYTES 23 12 - 49 %    MONOCYTES 7 5 - 13 %    EOSINOPHILS 4 0 - 7 %    BASOPHILS 1 0 - 1 %    IMMATURE GRANULOCYTES 0 0.0 - 0.5 %    ABS. NEUTROPHILS 5.2 1.8 - 8.0 K/UL    ABS. LYMPHOCYTES 1.8 0.8 - 3.5 K/UL    ABS. MONOCYTES 0.6 0.0 - 1.0 K/UL    ABS. EOSINOPHILS 0.3 0.0 - 0.4 K/UL    ABS. BASOPHILS 0.1 0.0 - 0.1 K/UL    ABS. IMM.  GRANS. 0.0 0.00 - 0.04 K/UL    DF SMEAR SCANNED      RBC COMMENTS ANISOCYTOSIS  1+        RBC COMMENTS MACROCYTOSIS  1+       METABOLIC PANEL, COMPREHENSIVE    Collection Time: 10/19/22  1:37 PM   Result Value Ref Range    Sodium 138 136 - 145 mmol/L    Potassium 3.8 3.5 - 5.1 mmol/L    Chloride 103 97 - 108 mmol/L    CO2 27 21 - 32 mmol/L    Anion gap 8 5 - 15 mmol/L    Glucose 246 (H) 65 - 100 mg/dL    BUN 28 (H) 6 - 20 MG/DL    Creatinine 6.52 (H) 0.55 - 1.02 MG/DL    BUN/Creatinine ratio 4 (L) 12 - 20      eGFR 6 (L) >60 ml/min/1.73m2    Calcium 7.5 (L) 8.5 - 10.1 MG/DL    Bilirubin, total 0.3 0.2 - 1.0 MG/DL    ALT (SGPT) 11 (L) 12 - 78 U/L    AST (SGOT) 8 (L) 15 - 37 U/L    Alk. phosphatase 98 45 - 117 U/L    Protein, total 8.1 6.4 - 8.2 g/dL    Albumin 2.9 (L) 3.5 - 5.0 g/dL    Globulin 5.2 (H) 2.0 - 4.0 g/dL    A-G Ratio 0.6 (L) 1.1 - 2.2     SAMPLES BEING HELD    Collection Time: 10/19/22  1:37 PM   Result Value Ref Range    SAMPLES BEING HELD [id number]     COMMENT        Add-on orders for these samples will be processed based on acceptable specimen integrity and analyte stability, which may vary by analyte.    TROPONIN-HIGH SENSITIVITY    Collection Time: 10/19/22  1:37 PM   Result Value Ref Range    Troponin-High Sensitivity 64 (H) 0 - 51 ng/L   TROPONIN-HIGH SENSITIVITY    Collection Time: 10/19/22  6:33 PM   Result Value Ref Range    Troponin-High Sensitivity 72 (H) 0 - 51 ng/L   NT-PRO BNP    Collection Time: 10/19/22  6:33 PM   Result Value Ref Range    NT pro-BNP 8,161 (H) <125 PG/ML   VITAMIN B12    Collection Time: 10/19/22  6:33 PM   Result Value Ref Range    Vitamin B12 479 193 - 986 pg/mL   FOLATE    Collection Time: 10/19/22  6:33 PM   Result Value Ref Range    Folate 5.2 5.0 - 21.0 ng/mL   RETICULOCYTE COUNT    Collection Time: 10/19/22  6:33 PM   Result Value Ref Range    Reticulocyte count 4.3 (H) 0.7 - 2.1 %    Absolute Retic Cnt. 0.0918 (H) 0.0164 - 0.0776 M/ul   FERRITIN    Collection Time: 10/19/22  6:33 PM   Result Value Ref Range    Ferritin 811 (H) 26 - 388 NG/ML   IRON PROFILE    Collection Time: 10/19/22  6:33 PM   Result Value Ref Range    Iron 35 35 - 150 ug/dL    TIBC 193 (L) 250 - 450 ug/dL    Iron % saturation 18 (L) 20 - 50 %   TSH 3RD GENERATION    Collection Time: 10/19/22  6:33 PM   Result Value Ref Range    TSH 3.01 0.36 - 3.74 uIU/mL   RBC, ALLOCATE    Collection Time: 10/19/22  6:45 PM   Result Value Ref Range    HISTORY CHECKED? Historical check performed    TYPE & SCREEN    Collection Time: 10/19/22  8:04 PM   Result Value Ref Range    Crossmatch Expiration 10/22/2022,2359     ABO/Rh(D) B POSITIVE     Antibody screen NEG     Unit number [id number]     Blood component type RC LR     Unit division 00     Status of unit TRANSFUSED     Crossmatch result Compatible    GLUCOSE, POC    Collection Time: 10/19/22 10:11 PM   Result Value Ref Range    Glucose (POC) 194 (H) 65 - 117 mg/dL    Performed by Germania Villafanar traveler RN    RENAL FUNCTION PANEL    Collection Time: 10/20/22  5:28 AM   Result Value Ref Range    Sodium 141 136 - 145 mmol/L    Potassium 3.4 (L) 3.5 - 5.1 mmol/L    Chloride 105 97 - 108 mmol/L    CO2 27 21 - 32 mmol/L    Anion gap 9 5 - 15 mmol/L    Glucose 212 (H) 65 - 100 mg/dL    BUN 29 (H) 6 - 20 MG/DL    Creatinine 6.27 (H) 0.55 - 1.02 MG/DL    BUN/Creatinine ratio 5 (L) 12 - 20      eGFR 7 (L) >60 ml/min/1.73m2    Calcium 7.6 (L) 8.5 - 10.1 MG/DL    Phosphorus 4.3 2.6 - 4.7 MG/DL    Albumin 2.8 (L) 3.5 - 5.0 g/dL   TROPONIN-HIGH SENSITIVITY    Collection Time: 10/20/22  5:28 AM   Result Value Ref Range    Troponin-High Sensitivity 78 (H) 0 - 51 ng/L   HEP B SURFACE AB    Collection Time: 10/20/22  5:28 AM   Result Value Ref Range    Hepatitis B surface Ab <3.10 mIU/mL    Hep B surface Ab Interp. NONREACTIVE NR     HEP B SURFACE AG    Collection Time: 10/20/22  5:28 AM   Result Value Ref Range    Hepatitis B surface Ag <0.10 Index    Hep B surface Ag Interp.  Negative NEG     CBC WITH AUTOMATED DIFF    Collection Time: 10/20/22  7:30 AM   Result Value Ref Range    WBC 7.1 3.6 - 11.0 K/uL    RBC 2.48 (L) 3.80 - 5.20 M/uL    HGB 7.6 (L) 11.5 - 16.0 g/dL    HCT 24.2 (L) 35.0 - 47.0 %    MCV 97.6 80.0 - 99.0 FL    MCH 30.6 26.0 - 34.0 PG    MCHC 31.4 30.0 - 36.5 g/dL    RDW 17.1 (H) 11.5 - 14.5 %    PLATELET 099 742 - 146 K/uL    MPV 9.6 8.9 - 12.9 FL    NRBC 0.3 (H) 0  WBC    ABSOLUTE NRBC 0.02 (H) 0.00 - 0.01 K/uL    NEUTROPHILS 58 32 - 75 %    LYMPHOCYTES 27 12 - 49 %    MONOCYTES 9 5 - 13 %    EOSINOPHILS 5 0 - 7 %    BASOPHILS 1 0 - 1 %    IMMATURE GRANULOCYTES 0 0.0 - 0.5 %    ABS. NEUTROPHILS 4.2 1.8 - 8.0 K/UL    ABS. LYMPHOCYTES 1.9 0.8 - 3.5 K/UL    ABS. MONOCYTES 0.6 0.0 - 1.0 K/UL    ABS. EOSINOPHILS 0.3 0.0 - 0.4 K/UL    ABS. BASOPHILS 0.1 0.0 - 0.1 K/UL    ABS. IMM. GRANS. 0.0 0.00 - 0.04 K/UL    DF AUTOMATED     GLUCOSE, POC    Collection Time: 10/20/22  8:23 AM   Result Value Ref Range    Glucose (POC) 251 (H) 65 - 117 mg/dL    Performed by Dillon Huizar MD  [address]  Phone - [phone]   Fax - [phone]  [email or web address]

## 2022-10-20 NOTE — DIALYSIS
Peritoneal Dialysis Initiation / [phone]           Orders   Therapy Time: 9 hrs   Cycles: 4   Fill Volume: 2,000 mls   Last Fill Volume: 0 mls    Total Volume: 8,000 mls   Solution: (2) 2.5% Dextrose Dianeal bags            Access   Type & Location: Left ABD PD Catheter   Comments:   Access site good, no drainage. Pt has no pain to palpation nor reports discomfort. Access site care performed per hospital P/P - Cleansed with ExSept, Gentamicin cream applied & dry gauze dressing changed, dated 10/20/22. Labs   HBsAg (Antigen) / date: Pending      HBsAb (Antibody) / date: Pending   Source: [facility]            Safety:   Time Out Done:    8348   Consent obtained/signed: On file   Education: Access / Site Care   Primary Nurse Rpt: ANGELITO Cadet RN      Comments: At bedside to initiate CCPD tx for the night. Orders, notes, labs, code status reviewed. Cycler settings programmed & verified. Cycler primed & tested. Prior to aseptic connection transfer set scrub/soak performed w/ alcavis for two minutes. Remained present during initial drain followed by initiation of first fill. Prior to departure, bed in lowest position, call bell and personal belongings within reach. Lines visible, secure, and connections fastened well. Education and pre/post report given to pt & primary RN.       Start time: [phone]  10/20/22  Estimated End Time:  2740  10/20/22

## 2022-10-20 NOTE — DIALYSIS
Peritoneal Dialysis Disconnection / [phone]     Metrics   I-Drain: 0ml   Total UF: 766ml   Last Fill: 0ml   Last Manual Drain: 0ml   Net UF:         766ml   Avg Dwell Time: 1:42   Lost Dwell Time: 0:31   Alarms: N/a   Effluent: Clear/yellow     Access   Type & Location: Mid abdomen,    Comments: Prior to disconnection, one-minute Alcavis scrub performed, followed by one minute soak per policy. Sterile mini cap applied and secured to abdomen. Dsg clean, dry and intact. Dsg date:10/20/22                                           Comments:   Pt tolerated tx well. Disconnected by teammate Sergio Nelson RN.

## 2022-10-20 NOTE — CONSULTS
NEPHROLOGY CONSULT NOTE     Patient: Mervin Franks MRN: [id number]  PCP: Weston Granger MD   :     1955  Age:   75 y.o. Sex:  female      Referring physician: Madelin Burciaga MD  Reason for consultation: 75 y.o. female with Symptomatic anemia [D98.8] complicated by BRENNAN   Admission Date: 10/19/2022  8:11 PM  LOS: 1 day     DISCUSSION / PLAN :   ESRD on dialysis  - Patient of Dr. Cori Huerta at [facility], CCPD  - RX tonight: 2.5/2.5% Dianeal, 4- exchanges, 9 hours  - Chest x-ray with mild interstitial pulmonary edema  - Access: PD catheter, LLQ- C/D/I  - Gentamicin cream to PD cath exit site  - Renally dose all medication   - Trend renal indices daily     Hypertension   - Continue coreg, hydralazine    Anemia of chronic disease  - hgb 6.6, transfusion ordered by primary team   - MADY MWF while here     COPD  DM2  CAD       Subjective:   HPI: Mervin Franks is a 75 y.o.  female who has a past medical history significant for ESRD on CCPD, anemia, COPD, CAD, diabetes, and hypertension. Presented to the ER complaining of shortness of breath and generalized weakness that has gotten progressively worse over the last 2-days. On arrival to the ER, workup revealed a hgb of 6.6, sCR 6.52 mg/dl, BUN 28, pro-BNP 8,161. Chest x-ray consistent with mild interstitial pulmonary edema. Vital signs notable for /92, HR 98, Temp 99.1, SpO2 100% on room air, RR 20. On exam patient endorses shortness of breath, edema, and intermittent constipation. Denies any CP, urinary changes, abdominal pain, cloudy or effluent fluid.    Patient is being admitted to the hospitalist service for further evaluation/treatment  Nephrology was consulted for the assistance in the management of dialysis  - Patient is followed by Dr. Hannah Hardy CCPD  - PD cath exit site, clean, dry, no erythema or drainage   - Patient denies missing any CCPD treatments    Past Medical Hx:   Past Medical History:   Diagnosis Date Asthma     CAD (coronary artery disease)     CHF (congestive heart failure) ([facility])     Chronic kidney disease     HD M-W- Radu Menon [phone]    COPD (chronic obstructive pulmonary disease) ([facility])     Diabetes ([facility])     DM2    GERD (gastroesophageal reflux disease)     Hypertension         Past Surgical Hx:     Past Surgical History:   Procedure Laterality Date    DELIVERY       HX CORONARY STENT PLACEMENT      HX HYSTERECTOMY      IR INSERT NON TUNL CVC OVER 5 YRS  12/3/2021    IR INSERT TUNL CVC W/O PORT OVER 5 YR  2021    MS CARDIAC SURG PROCEDURE UNLIST      VASCULAR SURGERY PROCEDURE UNLIST      leg stent       Medications:  Prior to Admission medications    Medication Sig Start Date End Date Taking? Authorizing Provider   lactulose 10 gram/15 mL (15 mL) soln Take 30 mL by mouth two (2) times daily as needed for PRN Reason (Other) (Constipation). 22   Provider, Historical   carvediloL (Coreg) 25 mg tablet Take 25 mg by mouth two (2) times daily (with meals). Provider, Historical   dulaglutide (Trulicity) 0.07 DOMENIC/5.9 mL sub-q pen 0.75 mg by SubCUTAneous route every seven (7) days. Provider, Historical   insulin glargine (LANTUS,BASAGLAR) 100 unit/mL (3 mL) inpn 20 Units by SubCUTAneous route daily. Provider, Historical   rosuvastatin (CRESTOR) 20 mg tablet Take 0.5 Tablets by mouth nightly. Take 1 Tab by mouth nightly. Dose reduced from 20 mg to 10 mg. 12/10/21   Gilmar Edwards MD   epoetin joni-epbx (RETACRIT) 20,000 unit/mL injection 1 mL by SubCUTAneous route every Monday, Wednesday, Friday. Indications: anemia due to kidney failure  Indications: anemia due to kidney failure 12/10/21   Gilmar Edwards MD   hydrALAZINE (APRESOLINE) 50 mg tablet Take 1 Tablet by mouth three (3) times daily. 12/10/21   Gilmar Edwards MD   mometasone-formoterol (DULERA) 200-5 mcg/actuation HFA inhaler Take 2 Puffs by inhalation two (2) times a day.     Provider, Historical   insulin NPH/insulin regular (NOVOLIN 70/30 U-100 INSULIN) 100 unit/mL (70-30) injection 45 Units by SubCUTAneous route two (2) times a day. Patient not taking: Reported on 1/18/2022 7/9/18   Yadi Mijares MD   albuterol-ipratropium (DUO-NEB) 2.5 mg-0.5 mg/3 ml nebu 3 mL by Nebulization route every six (6) hours as needed. Patient taking differently: 3 mL by Nebulization route every six (6) hours as needed for Shortness of Breath. 3/19/18   Yadi Mijares MD   montelukast (SINGULAIR) 10 mg tablet Take 1 Tab by mouth nightly. 3/19/18   Yadi Mijares MD   aspirin (ASPIRIN) 325 mg tablet Take 325 mg by mouth daily. Other, MD Tony       Allergies   Allergen Reactions    Ivp Dye [Fd And C Blue No.1] Hives       Social Hx:  reports that she quit smoking about a year ago. She has a 2.50 pack-year smoking history. She has never used smokeless tobacco. She reports that she does not drink alcohol and does not use drugs. Family History   Problem Relation Age of Onset    Heart Disease Mother     Cancer Father        Review of Systems:  A twelve point review of system was performed today. Pertinent positives and negatives are mentioned in the HPI. The reminder of the ROS is negative and noncontributory. Objective:    Vitals:    Vitals:    10/19/22 2320 10/19/22 2345 10/20/22 0152 10/20/22 0200   BP: (!) 179/90 (!) 153/82 (!) 182/92    Pulse: 98 97 98 95   Resp: 18 16 20    Temp: 98 °F (36.7 °C) 98.4 °F (36.9 °C) 99.1 °F (37.3 °C)    SpO2: 97% 97% 100%    Weight:   93 kg (205 lb 0.4 oz)    Height:   5' 4\" (1.626 m)      I&O's:  No intake/output data recorded. Visit Vitals  BP (!) 182/92 (BP 1 Location: Left upper arm, BP Patient Position: Sitting)   Pulse 95   Temp 99.1 °F (37.3 °C)   Resp 20   Ht 5' 4\" (1.626 m)   Wt 93 kg (205 lb 0.4 oz)   SpO2 100%   BMI 35.19 kg/m²       Physical Exam:  General:Alert, No distress  HEENT: Eyes are PERRL. Conjunctiva without pallor ,erythema.    Neck: Supple,no mass palpable  Lungs : Normal respiratory effort  CVS: RRR, S1 S2 normal, No rub, + LE edema  Abdomen: Soft, Non tender, bowel sounds present, PD catheter  Extremities: No cyanosis, No clubbing  Skin: No rash or lesions. Neurologic: non focal, AAO x 3  Psych: normal affect    Laboratory Results:    Lab Results   Component Value Date    BUN 28 (H) 10/19/2022     10/19/2022    K 3.8 10/19/2022     10/19/2022    CO2 27 10/19/2022       Lab Results   Component Value Date    BUN 28 (H) 10/19/2022    BUN 53 (H) 05/17/2022    BUN 48 (H) 05/16/2022    BUN 34 (H) 05/15/2022    BUN 25 (H) 12/08/2021    K 3.8 10/19/2022    K 3.2 (L) 05/17/2022    K 3.6 05/16/2022    K 3.4 (L) 05/15/2022    K 4.0 12/08/2021       Lab Results   Component Value Date    WBC 8.0 10/19/2022    RBC 2.15 (L) 10/19/2022    HGB 6.6 (L) 10/19/2022    HCT 21.7 (L) 10/19/2022    .9 (H) 10/19/2022    MCH 30.7 10/19/2022    RDW 16.3 (H) 10/19/2022     10/19/2022       Lab Results   Component Value Date    PHOS 3.5 12/07/2021       Urine dipstick:   Lab Results   Component Value Date/Time    Color YELLOW/STRAW 12/02/2021 11:00 PM    Appearance CLEAR 12/02/2021 11:00 PM    Specific gravity 1.025 12/02/2021 11:00 PM    Specific gravity 1.010 06/08/2011 12:00 AM    pH (UA) 5.5 12/02/2021 11:00 PM    Protein 300 (A) 12/02/2021 11:00 PM    Glucose 500 (A) 12/02/2021 11:00 PM    Ketone Negative 12/02/2021 11:00 PM    Bilirubin Negative 12/02/2021 11:00 PM    Urobilinogen 1.0 12/02/2021 11:00 PM    Nitrites Negative 12/02/2021 11:00 PM    Leukocyte Esterase Negative 12/02/2021 11:00 PM    Epithelial cells FEW 12/02/2021 11:00 PM    Bacteria Negative 12/02/2021 11:00 PM    WBC 0-4 12/02/2021 11:00 PM    RBC 0-5 12/02/2021 11:00 PM       I have reviewed the following:    All pertinent labs, microbiology data, radiology imaging for my assessment     ECG- Rev: Yes / No  Xray/CT/US/MRI REV: Yes/ No    Care Plan discussed with:  eli zamora nurse     Chart reviewed. Medications list Personally Reviewed   [x]      Yes     []               No      Thank you for allowing us to participate in the care of this patient. We will follow patient. Please dont hesitate to call with any questions    Carlos Ann NP  10/20/2022    [facility]  [address], [address]  Phone - [phone]  Fax - [phone]  [email or web address]

## 2022-10-20 NOTE — PROGRESS NOTES
[address] [facility] Adult  Hospitalist Group                                                                                          Hospitalist Progress Note  Agusto Fraire MD  Answering service: [phone] OR [phone] from in house phone        Date of Service:  10/20/2022  NAME:  Chaparro Vivar  :  1955  MRN:  [id number]      Admission Summary:   Chaparro Vivar is a 75 y.o. female with a pmhx CAD s/p PCI, HF, CKD on peritoneal dialysis, COPD, DM II, GERD, and hypertension, who presents with left sided neck pain, generalized weakness, and shortness of breath. She states that she has acute onset left side pain of the face radiation down her left arm and to her left sail. Pain improves with hydrocodone. In the ED, she was mildly tachycardic to 103, and hypertensive to the 170/90's. Labs are significant for macrocytic anemia with Hgb 6.6 (b/l 7-9), glucose 246, BUN 28, creatinine 6.52,  albumin 2.9, troponin 64>72, and probnp 8,161. CXR has been ordered, and is pending. In the Ed, she received percocet       Interval history / Subjective:   Feeling better since admitted, seen by nephrology, episodes of left sided head and neck pain, then developed left-sided thoracolumbar pain. Assessment & Plan:      #ESRD on PD  #symptomatic anemia: Received 1 unit PRBC. Denies history of any GI bleed. Check stool study  -nephrology consulted for IP PD  -check hemoccult, B12, folate, ferritin, iron studies, and reticulocytes     #COPD  #asthma  -continue steroid, LABA nebulizer. Continue home singulair     #HTN  -continue silke meds     #dyslipidemia  -continue statin, and aspirin           DIET: No diet orders on file   ISOLATION PRECAUTIONS: There are currently no Active Isolations  CODE STATUS: Prior   DVT PROPHYLAXIS: SCDs  ANTICIPATED DISCHARGE: 24-48 hours.   ANTICIPATED DISPOSITION: Home  EMERGENCY CONTACT/SURROGATE DECISION MAKER: Martha Mckinley (daughter)        Hospital Problems  Date Reviewed: 1/18/2022            Codes Class Noted POA    Symptomatic anemia ICD-10-CM: D64.9  ICD-9-CM: 285.9  10/19/2022 Unknown             Review of Systems:   A comprehensive review of systems was negative except for that written in the HPI. Vital Signs:    Last 24hrs VS reviewed since prior progress note. Most recent are:  Visit Vitals  BP (!) 179/80 (BP 1 Location: Left upper arm, BP Patient Position: At rest)   Pulse 93   Temp 98.7 °F (37.1 °C)   Resp 12   Ht 5' 4\" (1.626 m)   Wt 93 kg (205 lb 0.4 oz)   SpO2 99%   Breastfeeding No   BMI 35.19 kg/m²         Intake/Output Summary (Last 24 hours) at 10/20/2022 1232  Last data filed at 10/20/2022 0245  Gross per 24 hour   Intake 396.25 ml   Output --   Net 396.25 ml        Physical Examination:     I had a face to face encounter with this patient and independently examined them on 10/20/2022 as outlined below:          Constitutional:  No acute distress, cooperative, pleasant    ENT:  Oral mucosa moist, oropharynx benign. Resp:  CTA bilaterally. No wheezing/rhonchi/rales. No accessory muscle use. CV:  Regular rhythm, normal rate, no murmurs, gallops, rubs    GI:  Soft, non distended, non tender. normoactive bowel sounds, no hepatosplenomegaly     Musculoskeletal:  No edema, warm, 2+ pulses throughout    Neurologic:  Moves all extremities. AAOx3, CN II-XII reviewed            Data Review:    Review and/or order of clinical lab test      Labs:     Recent Labs     10/20/22  0730 10/19/22  1337   WBC 7.1 8.0   HGB 7.6* 6.6*   HCT 24.2* 21.7*    253     Recent Labs     10/20/22  0528 10/19/22  1337    138   K 3.4* 3.8    103   CO2 27 27   BUN 29* 28*   CREA 6.27* 6.52*   * 246*   CA 7.6* 7.5*   PHOS 4.3  --      Recent Labs     10/20/22  0528 10/19/22  1337   ALT  --  11*   AP  --  98   TBILI  --  0.3   TP  --  8.1   ALB 2.8* 2.9*   GLOB  --  5.2*     No results for input(s): INR, PTP, APTT, INREXT in the last 72 hours.    Recent Labs     10/19/22  1833   TIBC 193*   PSAT 18*   FERR 811*      Lab Results   Component Value Date/Time    Folate 5.2 10/19/2022 06:33 PM      No results for input(s): PH, PCO2, PO2 in the last 72 hours. No results for input(s): CPK, CKNDX, TROIQ in the last 72 hours.     No lab exists for component: CPKMB  Lab Results   Component Value Date/Time    Cholesterol, total 260 (H) 07/02/2018 04:27 AM    HDL Cholesterol 31 07/02/2018 04:27 AM    LDL, calculated 197.2 (H) 07/02/2018 04:27 AM    Triglyceride 159 (H) 07/02/2018 04:27 AM    CHOL/HDL Ratio 8.4 (H) 07/02/2018 04:27 AM     Lab Results   Component Value Date/Time    Glucose (POC) 310 (H) 10/20/2022 12:18 PM    Glucose (POC) 251 (H) 10/20/2022 08:23 AM    Glucose (POC) 194 (H) 10/19/2022 10:11 PM    Glucose (POC) 109 05/17/2022 05:06 PM    Glucose (POC) 145 (H) 05/17/2022 11:35 AM     Lab Results   Component Value Date/Time    Color YELLOW/STRAW 12/02/2021 11:00 PM    Appearance CLEAR 12/02/2021 11:00 PM    Specific gravity 1.025 12/02/2021 11:00 PM    Specific gravity 1.010 06/08/2011 12:00 AM    pH (UA) 5.5 12/02/2021 11:00 PM    Protein 300 (A) 12/02/2021 11:00 PM    Glucose 500 (A) 12/02/2021 11:00 PM    Ketone Negative 12/02/2021 11:00 PM    Bilirubin Negative 12/02/2021 11:00 PM    Urobilinogen 1.0 12/02/2021 11:00 PM    Nitrites Negative 12/02/2021 11:00 PM    Leukocyte Esterase Negative 12/02/2021 11:00 PM    Epithelial cells FEW 12/02/2021 11:00 PM    Bacteria Negative 12/02/2021 11:00 PM    WBC 0-4 12/02/2021 11:00 PM    RBC 0-5 12/02/2021 11:00 PM         Medications Reviewed:     Current Facility-Administered Medications   Medication Dose Route Frequency    sodium chloride (NS) flush 5-40 mL  5-40 mL IntraVENous Q8H    sodium chloride (NS) flush 5-40 mL  5-40 mL IntraVENous PRN    acetaminophen (TYLENOL) tablet 650 mg  650 mg Oral Q6H PRN    Or    acetaminophen (TYLENOL) suppository 650 mg  650 mg Rectal Q6H PRN    albuterol-ipratropium (DUO-NEB) 2.5 MG-0.5 MG/3 ML  3 mL Nebulization Q4H PRN    carvediloL (COREG) tablet 25 mg  25 mg Oral BID WITH MEALS    aspirin tablet 325 mg  325 mg Oral DAILY    rosuvastatin (CRESTOR) tablet 10 mg  10 mg Oral QHS    montelukast (SINGULAIR) tablet 10 mg  10 mg Oral QHS    hydrALAZINE (APRESOLINE) tablet 50 mg  50 mg Oral TID    peritoneal dialysis DEXTROSE 2.5% (2.5 mEq/L low calcium) solution 6,000 mL  6,000 mL IntraPERitoneal QPM    peritoneal dialysis DEXTROSE 2.5% (2.5 mEq/L low calcium) solution 6,000 mL  6,000 mL IntraPERitoneal QPM    [START ON 10/21/2022] epoetin joni-epbx (RETACRIT) injection 15,000 Units  15,000 Units SubCUTAneous Q MON, WED & FRI    fentaNYL citrate (PF) injection 25 mcg  25 mcg IntraVENous ONCE    oxyCODONE-acetaminophen (PERCOCET) 5-325 mg per tablet 1 Tablet  1 Tablet Oral Q6H PRN    gentamicin (GARAMYCIN) 0.1 % ointment   Topical DAILY    glucose chewable tablet 16 g  4 Tablet Oral PRN    glucagon (GLUCAGEN) injection 1 mg  1 mg IntraMUSCular PRN    dextrose 10 % infusion 0-250 mL  0-250 mL IntraVENous PRN    insulin lispro (HUMALOG) injection   SubCUTAneous AC&HS    0.9% sodium chloride infusion 250 mL  250 mL IntraVENous PRN     ______________________________________________________________________  EXPECTED LENGTH OF STAY: - - -  ACTUAL LENGTH OF STAY:          1                 Richie Garcia MD

## 2022-10-20 NOTE — ED NOTES
TRANSFER - OUT REPORT:    Verbal report given to Rea(name) on Jacki Romero  being transferred to [facility] 408(unit) for routine progression of care       Report consisted of patients Situation, Background, Assessment and   Recommendations(SBAR). Information from the following report(s) SBAR and ED Summary was reviewed with the receiving nurse. Lines:   Peripheral IV 10/19/22 Right Antecubital (Active)   Site Assessment Clean, dry, & intact 10/19/22 1336   Phlebitis Assessment 0 10/19/22 1336   Infiltration Assessment 0 10/19/22 1336   Dressing Status Clean, dry, & intact 10/19/22 1336   Dressing Type Transparent 10/19/22 1336   Hub Color/Line Status Pink 10/19/22 1336   Action Taken Blood drawn 10/19/22 1336        Opportunity for questions and clarification was provided.       Patient transported with:   Monitor  Registered Nurse

## 2022-10-20 NOTE — H&P
[address] [facility] Adult  Hospitalist Group  History and Physical    Date of Service:  10/19/2022  Primary Care Provider: Marlen Russell MD  Source of information: The patient    Chief Complaint: Chest Pain, Respiratory Distress, and Shortness of Breath      History of Presenting Illness:   Samanta Flores is a 75 y.o. female with a pmhx CAD s/p PCI, HF, CKD on peritoneal dialysis, COPD, DM II, GERD, and hypertension, who presents with left sided neck pain, generalized weakness, and shortness of breath. She states that she has acute onset left side pain of the face radiation down her left arm and to her left sail. Pain improves with hydrocodone. In the ED, she was mildly tachycardic to 103, and hypertensive to the 170/90's. Labs are significant for macrocytic anemia with Hgb 6.6 (b/l 7-9), glucose 246, BUN 28, creatinine 6.52,  albumin 2.9, troponin 64>72, and probnp 8,161. CXR has been ordered, and is pending. In the Ed, she received percocet     REVIEW OF SYSTEMS:  A comprehensive review of systems was negative except for that written in the History of Present Illness. Past Medical History:   Diagnosis Date    Asthma     CAD (coronary artery disease)     CHF (congestive heart failure) (HCC)     Chronic kidney disease     HD  [facility] [place]     COPD (chronic obstructive pulmonary disease) (HCC)     Diabetes (HCC)     DM2    GERD (gastroesophageal reflux disease)     Hypertension       Past Surgical History:   Procedure Laterality Date    DELIVERY       HX CORONARY STENT PLACEMENT      HX HYSTERECTOMY      IR INSERT NON TUNL CVC OVER 5 YRS  12/3/2021    IR INSERT TUNL CVC W/O PORT OVER 5 YR  2021    [place] CARDIAC SURG PROCEDURE UNLIST      VASCULAR SURGERY PROCEDURE UNLIST      leg stent     Prior to Admission medications    Medication Sig Start Date End Date Taking?  Authorizing Provider   lactulose 10 gram/15 mL (15 mL) soln Take 30 mL by mouth two (2) times daily as needed for PRN Reason (Other) (Constipation). 6/1/22   Provider, Historical   carvediloL (Coreg) 25 mg tablet Take 25 mg by mouth two (2) times daily (with meals). Provider, Historical   dulaglutide (Trulicity) 8.85 NR/2.8 mL sub-q pen 0.75 mg by SubCUTAneous route every seven (7) days. Provider, Historical   insulin glargine (LANTUS,BASAGLAR) 100 unit/mL (3 mL) inpn 20 Units by SubCUTAneous route daily. Provider, Historical   rosuvastatin (CRESTOR) 20 mg tablet Take 0.5 Tablets by mouth nightly. Take 1 Tab by mouth nightly. Dose reduced from 20 mg to 10 mg. 12/10/21   Gilmar Edwards MD   epoetin joni-epbx (RETACRIT) 20,000 unit/mL injection 1 mL by SubCUTAneous route every Monday, Wednesday, Friday. Indications: anemia due to kidney failure  Indications: anemia due to kidney failure 12/10/21   Gilmar Edwards MD   hydrALAZINE (APRESOLINE) 50 mg tablet Take 1 Tablet by mouth three (3) times daily. 12/10/21   Gilmar Edwards MD   mometasone-formoterol (DULERA) 200-5 mcg/actuation HFA inhaler Take 2 Puffs by inhalation two (2) times a day. Provider, Historical   insulin NPH/insulin regular (NOVOLIN 70/30 U-100 INSULIN) 100 unit/mL (70-30) injection 45 Units by SubCUTAneous route two (2) times a day. Patient not taking: Reported on 1/18/2022 7/9/18   Isidro Blizzard, MD   albuterol-ipratropium (DUO-NEB) 2.5 mg-0.5 mg/3 ml nebu 3 mL by Nebulization route every six (6) hours as needed. Patient taking differently: 3 mL by Nebulization route every six (6) hours as needed for Shortness of Breath. 3/19/18   Isidro Blizzard, MD   montelukast (SINGULAIR) 10 mg tablet Take 1 Tab by mouth nightly. 3/19/18   Isidro Blizzard, MD   aspirin (ASPIRIN) 325 mg tablet Take 325 mg by mouth daily.     Other, MD Tony     Allergies   Allergen Reactions    Ivp Dye [Fd And C Blue No.1] Hives      Family History   Problem Relation Age of Onset    Heart Disease Mother     Cancer Father       Social History:  reports that she quit smoking about a year ago. She has a 2.50 pack-year smoking history. She has never used smokeless tobacco. She reports that she does not drink alcohol and does not use drugs. Family and social history were personally reviewed, all pertinent and relevant details are outlined as above. Objective:   Visit Vitals  BP (!) 153/82   Pulse 97   Temp 98.4 °F (36.9 °C)   Resp 16   SpO2 97%      O2 Device: None (Room air)    PHYSICAL EXAM:   General: Alert x oriented x 3, awake, no acute distress, resting in bed, pleasant emale, appears to be stated age  HEENT: PEERL, EOMI, moist mucus membranes  Neck: Supple, no JVD, no meningeal signs  Chest: Clear to auscultation bilaterally   CVS: RRR, S1 S2 heard, no murmurs/rubs/gallops  Abd: Soft, non-tender, non-distended, +bowel sounds . PD catheter present  Ext: No clubbing, no cyanosis, no edema  Neuro/Psych: Pleasant mood and affect, CN 2-12 grossly intact, sensory grossly within normal limit, Strength 5/5 in all extremities  Cap refill: Brisk, less than 3 seconds  Pulses: 2+, symmetric in all extremities  Skin: Warm, dry, without rashes or lesions    Data Review: All diagnostic labs and studies have been reviewed.     Abnormal Labs Reviewed   CBC WITH AUTOMATED DIFF - Abnormal; Notable for the following components:       Result Value    RBC 2.15 (*)     HGB 6.6 (*)     HCT 21.7 (*)     .9 (*)     RDW 16.3 (*)     All other components within normal limits   METABOLIC PANEL, COMPREHENSIVE - Abnormal; Notable for the following components:    Glucose 246 (*)     BUN 28 (*)     Creatinine 6.52 (*)     BUN/Creatinine ratio 4 (*)     eGFR 6 (*)     Calcium 7.5 (*)     ALT (SGPT) 11 (*)     AST (SGOT) 8 (*)     Albumin 2.9 (*)     Globulin 5.2 (*)     A-G Ratio 0.6 (*)     All other components within normal limits   TROPONIN-HIGH SENSITIVITY - Abnormal; Notable for the following components:    Troponin-High Sensitivity 64 (*)     All other components within normal limits   TROPONIN-HIGH SENSITIVITY - Abnormal; Notable for the following components:    Troponin-High Sensitivity 72 (*)     All other components within normal limits   NT-PRO BNP - Abnormal; Notable for the following components:    NT pro-BNP 8,161 (*)     All other components within normal limits   GLUCOSE, POC - Abnormal; Notable for the following components:    Glucose (POC) 194 (*)     All other components within normal limits       All Micro Results       None            IMAGING:   XR CHEST PA LAT   Final Result      Minimal cardiomegaly and interstitial pulmonary edema. ECG/ECHO:    Results for orders placed or performed during the hospital encounter of 10/19/22   EKG, 12 LEAD, INITIAL   Result Value Ref Range    Ventricular Rate 99 BPM    Atrial Rate 99 BPM    P-R Interval 148 ms    QRS Duration 86 ms    Q-T Interval 418 ms    QTC Calculation (Bezet) 536 ms    Calculated P Axis 70 degrees    Calculated R Axis 35 degrees    Calculated T Axis 120 degrees    Diagnosis       Normal sinus rhythm  Nonspecific ST and T wave abnormality  Prolonged QT  Abnormal ECG  When compared with ECG of 15-MAY-2022 04:03,  QRS axis shifted right  Nonspecific T wave abnormality now evident in Inferior leads  Nonspecific T wave abnormality now evident in Lateral leads  Confirmed by Rica Sapp MD ([id number]) on 10/19/2022 9:00:31 PM          Assessment:   Given the patient's current clinical presentation, there is a high level of concern for decompensation if discharged from the emergency department. Complex decision making was performed, which includes reviewing the patient's available past medical records, laboratory results, and imaging studies.     Active Problems:    Symptomatic anemia (10/19/2022)      Plan:     #ESRD on PD  #symptomatic anemia  -nephrology consulted for IP PD  -transfuse for hgb <7  -check hemoccult, B12, folate, ferritin, iron studies, and reticulocytes    #COPD  #asthma  -continue steroid, LABA nebulizer. Continue home singulair    #HTN  -continue silke meds    #dyslipidemia  -continue statin, and aspirin        DIET: No diet orders on file   ISOLATION PRECAUTIONS: There are currently no Active Isolations  CODE STATUS: Prior   DVT PROPHYLAXIS: SCDs  ANTICIPATED DISCHARGE: 24-48 hours. ANTICIPATED DISPOSITION: Home  EMERGENCY CONTACT/SURROGATE DECISION MAKER: Sheri Wallace (daughter)    Signed By: Joao Stout MD     October 19, 2022         Please note that this dictation may have been completed with Dragon, the [employer] voice recognition software. Quite often unanticipated grammatical, syntax, homophones, and other interpretive errors are inadvertently transcribed by the computer software. Please disregard these errors. Please excuse any errors that have escaped final proofreading.

## 2022-10-20 NOTE — PROGRESS NOTES
Problem: Falls - Risk of  Goal: *Absence of Falls  Description: Document Sofia Fothergill Fall Risk and appropriate interventions in the flowsheet. Outcome: Progressing Towards Goal  Note: Fall Risk Interventions:         Problem: Patient Education: Go to Patient Education Activity  Goal: Patient/Family Education  Outcome: Progressing Towards Goal     Problem: General Medical Care Plan  Goal: *Vital signs within specified parameters  Outcome: Progressing Towards Goal  Goal: *Labs within defined limits  Outcome: Progressing Towards Goal  Goal: *Absence of infection signs and symptoms  Outcome: Progressing Towards Goal  Goal: *Optimal pain control at patient's stated goal  Outcome: Progressing Towards Goal  Goal: *Skin integrity maintained  Outcome: Progressing Towards Goal     Problem: Pressure Injury - Risk of  Goal: *Prevention of pressure injury  Description: Document Waldo Scale and appropriate interventions in the flowsheet.   Outcome: Progressing Towards Goal  Note: Pressure Injury Interventions:

## 2022-10-21 ENCOUNTER — APPOINTMENT (OUTPATIENT)
Dept: MRI IMAGING | Age: 67
DRG: 291 | End: 2022-10-21
Attending: FAMILY MEDICINE
Payer: MEDICARE

## 2022-10-21 LAB
ALBUMIN SERPL-MCNC: 2.7 G/DL (ref 3.5–5)
ANION GAP SERPL CALC-SCNC: 8 MMOL/L (ref 5–15)
BASOPHILS # BLD: 0.1 K/UL (ref 0–0.1)
BASOPHILS NFR BLD: 1 % (ref 0–1)
BUN SERPL-MCNC: 28 MG/DL (ref 6–20)
BUN/CREAT SERPL: 5 (ref 12–20)
CALCIUM SERPL-MCNC: 7.8 MG/DL (ref 8.5–10.1)
CHLORIDE SERPL-SCNC: 107 MMOL/L (ref 97–108)
CO2 SERPL-SCNC: 26 MMOL/L (ref 21–32)
CREAT SERPL-MCNC: 6.17 MG/DL (ref 0.55–1.02)
DIFFERENTIAL METHOD BLD: ABNORMAL
EOSINOPHIL # BLD: 0.3 K/UL (ref 0–0.4)
EOSINOPHIL NFR BLD: 5 % (ref 0–7)
ERYTHROCYTE [DISTWIDTH] IN BLOOD BY AUTOMATED COUNT: 17.1 % (ref 11.5–14.5)
GLUCOSE BLD STRIP.AUTO-MCNC: 170 MG/DL (ref 65–117)
GLUCOSE BLD STRIP.AUTO-MCNC: 224 MG/DL (ref 65–117)
GLUCOSE BLD STRIP.AUTO-MCNC: 224 MG/DL (ref 65–117)
GLUCOSE BLD STRIP.AUTO-MCNC: 251 MG/DL (ref 65–117)
GLUCOSE BLD STRIP.AUTO-MCNC: 268 MG/DL (ref 65–117)
GLUCOSE SERPL-MCNC: 151 MG/DL (ref 65–100)
HCT VFR BLD AUTO: 27.4 % (ref 35–47)
HGB BLD-MCNC: 8.6 G/DL (ref 11.5–16)
IMM GRANULOCYTES # BLD AUTO: 0 K/UL (ref 0–0.04)
IMM GRANULOCYTES NFR BLD AUTO: 0 % (ref 0–0.5)
LYMPHOCYTES # BLD: 2.2 K/UL (ref 0.8–3.5)
LYMPHOCYTES NFR BLD: 31 % (ref 12–49)
MCH RBC QN AUTO: 30.4 PG (ref 26–34)
MCHC RBC AUTO-ENTMCNC: 31.4 G/DL (ref 30–36.5)
MCV RBC AUTO: 96.8 FL (ref 80–99)
MONOCYTES # BLD: 0.6 K/UL (ref 0–1)
MONOCYTES NFR BLD: 8 % (ref 5–13)
NEUTS SEG # BLD: 3.9 K/UL (ref 1.8–8)
NEUTS SEG NFR BLD: 55 % (ref 32–75)
NRBC # BLD: 0 K/UL (ref 0–0.01)
NRBC BLD-RTO: 0 PER 100 WBC
PHOSPHATE SERPL-MCNC: 5.4 MG/DL (ref 2.6–4.7)
PLATELET # BLD AUTO: 252 K/UL (ref 150–400)
PMV BLD AUTO: 9.5 FL (ref 8.9–12.9)
POTASSIUM SERPL-SCNC: 4.2 MMOL/L (ref 3.5–5.1)
RBC # BLD AUTO: 2.83 M/UL (ref 3.8–5.2)
SERVICE CMNT-IMP: ABNORMAL
SODIUM SERPL-SCNC: 141 MMOL/L (ref 136–145)
WBC # BLD AUTO: 7 K/UL (ref 3.6–11)

## 2022-10-21 PROCEDURE — 65270000046 HC RM TELEMETRY

## 2022-10-21 PROCEDURE — 36415 COLL VENOUS BLD VENIPUNCTURE: CPT

## 2022-10-21 PROCEDURE — 85025 COMPLETE CBC W/AUTO DIFF WBC: CPT

## 2022-10-21 PROCEDURE — 74011250637 HC RX REV CODE- 250/637: Performed by: INTERNAL MEDICINE

## 2022-10-21 PROCEDURE — 74011000250 HC RX REV CODE- 250: Performed by: FAMILY MEDICINE

## 2022-10-21 PROCEDURE — A4726 DIALYS SOL FLD VOL > 5999CC: HCPCS | Performed by: NURSE PRACTITIONER

## 2022-10-21 PROCEDURE — 96372 THER/PROPH/DIAG INJ SC/IM: CPT

## 2022-10-21 PROCEDURE — 74011636637 HC RX REV CODE- 636/637: Performed by: FAMILY MEDICINE

## 2022-10-21 PROCEDURE — 74011250637 HC RX REV CODE- 250/637: Performed by: FAMILY MEDICINE

## 2022-10-21 PROCEDURE — 80069 RENAL FUNCTION PANEL: CPT

## 2022-10-21 PROCEDURE — 90945 DIALYSIS ONE EVALUATION: CPT

## 2022-10-21 PROCEDURE — 82962 GLUCOSE BLOOD TEST: CPT

## 2022-10-21 PROCEDURE — 72141 MRI NECK SPINE W/O DYE: CPT

## 2022-10-21 PROCEDURE — 74011250636 HC RX REV CODE- 250/636: Performed by: NURSE PRACTITIONER

## 2022-10-21 RX ADMIN — Medication 2 UNITS: at 09:12

## 2022-10-21 RX ADMIN — ASPIRIN 325 MG ORAL TABLET 325 MG: 325 PILL ORAL at 09:12

## 2022-10-21 RX ADMIN — SODIUM CHLORIDE, SODIUM LACTATE, CALCIUM CHLORIDE, MAGNESIUM CHLORIDE AND DEXTROSE 6000 ML: 2.5; 538; 448; 18.3; 5.08 INJECTION, SOLUTION INTRAPERITONEAL at 20:33

## 2022-10-21 RX ADMIN — HYDRALAZINE HYDROCHLORIDE 50 MG: 50 TABLET, FILM COATED ORAL at 16:34

## 2022-10-21 RX ADMIN — Medication 5 UNITS: at 12:30

## 2022-10-21 RX ADMIN — EPOETIN ALFA-EPBX 15000 UNITS: 10000 INJECTION, SOLUTION INTRAVENOUS; SUBCUTANEOUS at 23:41

## 2022-10-21 RX ADMIN — OXYCODONE AND ACETAMINOPHEN 1 TABLET: 5; 325 TABLET ORAL at 08:17

## 2022-10-21 RX ADMIN — CARVEDILOL 25 MG: 12.5 TABLET, FILM COATED ORAL at 16:33

## 2022-10-21 RX ADMIN — CARVEDILOL 25 MG: 12.5 TABLET, FILM COATED ORAL at 09:12

## 2022-10-21 RX ADMIN — SODIUM CHLORIDE, PRESERVATIVE FREE 10 ML: 5 INJECTION INTRAVENOUS at 23:42

## 2022-10-21 RX ADMIN — HYDRALAZINE HYDROCHLORIDE 50 MG: 50 TABLET, FILM COATED ORAL at 23:40

## 2022-10-21 RX ADMIN — ROSUVASTATIN 10 MG: 10 TABLET, FILM COATED ORAL at 23:40

## 2022-10-21 RX ADMIN — GENTAMICIN SULFATE: 1 OINTMENT TOPICAL at 20:36

## 2022-10-21 RX ADMIN — HYDRALAZINE HYDROCHLORIDE 50 MG: 50 TABLET, FILM COATED ORAL at 09:12

## 2022-10-21 RX ADMIN — OXYCODONE AND ACETAMINOPHEN 1 TABLET: 5; 325 TABLET ORAL at 23:55

## 2022-10-21 RX ADMIN — MONTELUKAST 10 MG: 10 TABLET, FILM COATED ORAL at 23:40

## 2022-10-21 RX ADMIN — Medication 3 UNITS: at 23:40

## 2022-10-21 RX ADMIN — OXYCODONE AND ACETAMINOPHEN 1 TABLET: 5; 325 TABLET ORAL at 15:39

## 2022-10-21 NOTE — PROGRESS NOTES
2: 00 p.m. Hospital follow-up PCP transitional care appointment has been scheduled with Aleks Cohen for Tuesday, November 1st, 2022  at 12:00 noon. Please note this is VIRTUAL VIDEO  appointment. Nurse will follow up with patient 30 min prior to appointment Pending patient discharge. Yanick Garcia Management Assistant       Attempted to schedule hospital follow up PCP appointment. Office /Nurse will contact the patient with appointment information. Pending patient discharge.  Yanick Garcia

## 2022-10-21 NOTE — DISCHARGE INSTRUCTIONS
Download the Heart Failure McCaskill Abbe: Search in your [employer] (Android) or [employer] Abbe Store ([employer]): Search for- HF McCaskill Abbe.    [employer]    HF McCaskill is a brand-new phone abbe that helps you track daily symptoms, vitals, mood, energy level and more. You can even add your heart failure care team members to view your data and monitor your condition at home. HF McCaskill lets you:  Track symptoms, medications and more  Share health information with your health care team  Connect with others living with heart failure          Discharge Instructions       PATIENT ID: Grecia Poster  MRN: [id number]   YOB: 1955    DATE OF ADMISSION: [unfilled]    DATE OF DISCHARGE: 10/25/2022    PRIMARY CARE PROVIDER: @PCP@     ATTENDING PHYSICIAN: [unfilled]  DISCHARGING PROVIDER: Roseanne Tucker MD    To contact this individual call [phone] and ask the  to page. If unavailable ask to be transferred the Adult Hospitalist Department. DISCHARGE DIAGNOSES ESRD ON PD    CONSULTATIONS: [unfilled]    PROCEDURES/SURGERIES: * No surgery found *    PENDING TEST RESULTS:   At the time of discharge the following test results are still pending:     FOLLOW UP APPOINTMENTS:   [unfilled]     ADDITIONAL CARE RECOMMENDATIONS:     DIET: Cardiac Diet    ACTIVITY: Activity as tolerated    WOUND CARE:     EQUIPMENT needed:       Radiology      DISCHARGE MEDICATIONS:   See Medication Reconciliation Form    It is important that you take the medication exactly as they are prescribed. Keep your medication in the bottles provided by the pharmacist and keep a list of the medication names, dosages, and times to be taken in your wallet. Do not take other medications without consulting your doctor. NOTIFY YOUR PHYSICIAN FOR ANY OF THE FOLLOWING:   Fever over 101 degrees for 24 hours.    Chest pain, shortness of breath, fever, chills, nausea, vomiting, diarrhea, change in mentation, falling, weakness, bleeding. Severe pain or pain not relieved by medications. Or, any other signs or symptoms that you may have questions about.       DISPOSITION:  x  Home With:   OT x PT x HH  RN       SNF/Inpatient Rehab/LTAC    Independent/assisted living    Hospice    Other:     CDMP Checked:   Yes x     PROBLEM LIST Updated:  Yes x       Signed:   Sheila Leahy MD  10/25/2022  10:30 AM

## 2022-10-21 NOTE — PROGRESS NOTES
Occupational Therapy Screening:  Services maybe indicated at this time. [facility] based on results obtained during the nursing admission assessment. The patients chart was reviewed . Please order a consult for occupational therapy if patient has had a decline in function from baseline and you would like an evaluation to be completed. Thank you.

## 2022-10-21 NOTE — PROGRESS NOTES
[address] [facility] Adult  Hospitalist Group                                                                                          Hospitalist Progress Note  Rosa Maria Escalante MD  Answering service: [phone] from in house phone        Date of Service:  10/21/2022  NAME:  Janee Moya  :  1955  MRN:  [id number]      Admission Summary:   Janee Moya is a 75 y.o. female with a pmhx CAD s/p PCI, HF, CKD on peritoneal dialysis, COPD, DM II, GERD, and hypertension, who presents with left sided neck pain, generalized weakness, and shortness of breath. She states that she has acute onset left side pain of the face radiation down her left arm and to her left sail. Pain improves with hydrocodone. In the ED, she was mildly tachycardic to 103, and hypertensive to the 170/90's. Labs are significant for macrocytic anemia with Hgb 6.6 (b/l 7-9), glucose 246, BUN 28, creatinine 6.52,  albumin 2.9, troponin 64>72, and probnp 8,161. CXR has been ordered, and is pending. In the Ed, she received percocet       Interval history / Subjective:   Severe neck pain, arm pain left side upper back pain, no chest pain or shortness of breath  Will order MRI of cervical spine, suspected severe DJD      Assessment & Plan:      #ESRD on PD  #symptomatic anemia: Received 1 unit PRBC. Denies history of any GI bleed. Check stool study  -nephrology consulted for IP PD  -check hemoccult, B12, folate, ferritin, iron studies, and reticulocytes     #COPD  #asthma  -continue steroid, LABA nebulizer. Continue home singulair     #HTN  -continue silke meds     #dyslipidemia  -continue statin, and aspirin  Neck pain: MRI of cervical spine, consult spine surgery of indicated            DIET: No diet orders on file   ISOLATION PRECAUTIONS: There are currently no Active Isolations  CODE STATUS: Prior   DVT PROPHYLAXIS: SCDs  ANTICIPATED DISCHARGE: 24-48 hours.   ANTICIPATED DISPOSITION: Home  EMERGENCY CONTACT/SURROGATE DECISION MAKER: Irene Carrington (daughter)        Hospital Problems  Date Reviewed: 1/18/2022            Codes Class Noted POA    Symptomatic anemia ICD-10-CM: D64.9  ICD-9-CM: 285.9  10/19/2022 Unknown           Review of Systems:   A comprehensive review of systems was negative except for that written in the HPI. Vital Signs:    Last 24hrs VS reviewed since prior progress note. Most recent are:  Visit Vitals  BP (!) 163/77   Pulse 84   Temp 99 °F (37.2 °C)   Resp 13   Ht 5' 4\" (1.626 m)   Wt 95 kg (209 lb 7 oz)   SpO2 100%   Breastfeeding No   BMI 35.95 kg/m²       No intake or output data in the 24 hours ending 10/21/22 7726       Physical Examination:     I had a face to face encounter with this patient and independently examined them on 10/21/2022 as outlined below:          Constitutional:  No acute distress, cooperative, pleasant    ENT:  Oral mucosa moist, oropharynx benign. Resp:  CTA bilaterally. No wheezing/rhonchi/rales. No accessory muscle use. CV:  Regular rhythm, normal rate, no murmurs, gallops, rubs    GI:  Soft, non distended, non tender. normoactive bowel sounds, no hepatosplenomegaly     Musculoskeletal:  No edema, warm, 2+ pulses throughout    Neurologic:  Moves all extremities.   AAOx3, CN II-XII reviewed            Data Review:    Review and/or order of clinical lab test      Labs:     Recent Labs     10/21/22  0815 10/20/22  0730   WBC 7.0 7.1   HGB 8.6* 7.6*   HCT 27.4* 24.2*    236       Recent Labs     10/21/22  0815 10/20/22  0528 10/19/22  1337    141 138   K 4.2 3.4* 3.8    105 103   CO2 26 27 27   BUN 28* 29* 28*   CREA 6.17* 6.27* 6.52*   * 212* 246*   CA 7.8* 7.6* 7.5*   PHOS 5.4* 4.3  --        Recent Labs     10/21/22  0815 10/20/22  0528 10/19/22  1337   ALT  --   --  11*   AP  --   --  98   TBILI  --   --  0.3   TP  --   --  8.1   ALB 2.7* 2.8* 2.9*   GLOB  --   --  5.2*       No results for input(s): INR, PTP, APTT, INREXT, INREXT in the last 72 hours. Recent Labs     10/19/22  1833   TIBC 193*   PSAT 18*   FERR 811*        Lab Results   Component Value Date/Time    Folate 5.2 10/19/2022 06:33 PM        No results for input(s): PH, PCO2, PO2 in the last 72 hours. No results for input(s): CPK, CKNDX, TROIQ in the last 72 hours.     No lab exists for component: CPKMB  Lab Results   Component Value Date/Time    Cholesterol, total 260 (H) 07/02/2018 04:27 AM    HDL Cholesterol 31 07/02/2018 04:27 AM    LDL, calculated 197.2 (H) 07/02/2018 04:27 AM    Triglyceride 159 (H) 07/02/2018 04:27 AM    CHOL/HDL Ratio 8.4 (H) 07/02/2018 04:27 AM     Lab Results   Component Value Date/Time    Glucose (POC) 268 (H) 10/21/2022 11:35 AM    Glucose (POC) 170 (H) 10/21/2022 08:55 AM    Glucose (POC) 198 (H) 10/20/2022 10:16 PM    Glucose (POC) 197 (H) 10/20/2022 05:56 PM    Glucose (POC) 187 (H) 10/20/2022 04:40 PM     Lab Results   Component Value Date/Time    Color YELLOW/STRAW 12/02/2021 11:00 PM    Appearance CLEAR 12/02/2021 11:00 PM    Specific gravity 1.025 12/02/2021 11:00 PM    Specific gravity 1.010 06/08/2011 12:00 AM    pH (UA) 5.5 12/02/2021 11:00 PM    Protein 300 (A) 12/02/2021 11:00 PM    Glucose 500 (A) 12/02/2021 11:00 PM    Ketone Negative 12/02/2021 11:00 PM    Bilirubin Negative 12/02/2021 11:00 PM    Urobilinogen 1.0 12/02/2021 11:00 PM    Nitrites Negative 12/02/2021 11:00 PM    Leukocyte Esterase Negative 12/02/2021 11:00 PM    Epithelial cells FEW 12/02/2021 11:00 PM    Bacteria Negative 12/02/2021 11:00 PM    WBC 0-4 12/02/2021 11:00 PM    RBC 0-5 12/02/2021 11:00 PM         Medications Reviewed:     Current Facility-Administered Medications   Medication Dose Route Frequency    sodium chloride (NS) flush 5-40 mL  5-40 mL IntraVENous Q8H    sodium chloride (NS) flush 5-40 mL  5-40 mL IntraVENous PRN    acetaminophen (TYLENOL) tablet 650 mg  650 mg Oral Q6H PRN    Or    acetaminophen (TYLENOL) suppository 650 mg  650 mg Rectal Q6H PRN albuterol-ipratropium (DUO-NEB) 2.5 MG-0.5 MG/3 ML  3 mL Nebulization Q4H PRN    carvediloL (COREG) tablet 25 mg  25 mg Oral BID WITH MEALS    aspirin tablet 325 mg  325 mg Oral DAILY    rosuvastatin (CRESTOR) tablet 10 mg  10 mg Oral QHS    montelukast (SINGULAIR) tablet 10 mg  10 mg Oral QHS    hydrALAZINE (APRESOLINE) tablet 50 mg  50 mg Oral TID    peritoneal dialysis DEXTROSE 2.5% (2.5 mEq/L low calcium) solution 6,000 mL  6,000 mL IntraPERitoneal QPM    peritoneal dialysis DEXTROSE 2.5% (2.5 mEq/L low calcium) solution 6,000 mL  6,000 mL IntraPERitoneal QPM    epoetin joni-epbx (RETACRIT) injection 15,000 Units  15,000 Units SubCUTAneous Q MON, WED & FRI    oxyCODONE-acetaminophen (PERCOCET) 5-325 mg per tablet 1 Tablet  1 Tablet Oral Q6H PRN    gentamicin (GARAMYCIN) 0.1 % ointment   Topical DAILY    glucose chewable tablet 16 g  4 Tablet Oral PRN    glucagon (GLUCAGEN) injection 1 mg  1 mg IntraMUSCular PRN    dextrose 10 % infusion 0-250 mL  0-250 mL IntraVENous PRN    insulin lispro (HUMALOG) injection   SubCUTAneous AC&HS    0.9% sodium chloride infusion 250 mL  250 mL IntraVENous PRN     ______________________________________________________________________  EXPECTED LENGTH OF STAY: 3d 14h  ACTUAL LENGTH OF STAY:          2                 Nahum Hennessy MD

## 2022-10-21 NOTE — PROGRESS NOTES
Bedside shift change report given to Dori Wong (oncoming nurse) by Kate Mejia RN (offgoing nurse). Report included the following information SBAR, MAR, and Cardiac Rhythm NSR .

## 2022-10-21 NOTE — PROGRESS NOTES
Bedside and Verbal shift change report given to MARIAN Cooper (oncoming nurse) by Katheryne Dakin, RN (offgoing nurse). Report included the following information SBAR, Kardex, ED Summary, MAR, Recent Results, Cardiac Rhythm NSR, and Quality Measures.

## 2022-10-21 NOTE — NURSE NAVIGATOR
HEART FAILURE NURSE NAVIGATOR NOTE  [facility]    Patient chart was reviewed by Heart Failure (HF) Nurse Navigators for compliance of prescribed treatment with guidelines directed medical therapy (GDMT) and HF database completed. Please, review beneath recommendations for symptomatic patients with HF with Preserved Ejection Fraction ? 50% (HFpEF) for your consideration when taking care of this patient. Current HF Medical Therapy:      Name Marie READ 1955   LVEF 60/65 21   NYHA Functional Class Documentation needed   ARNi/ACEi/ARB    Aldosterone Antagonist Contraindicated <GFR   SGLT2 inhibitor Contraindicated HD   Consulting Cardiologist      Recommendations for HF Management:    For patients with HFpEF ? 50%, consider adding the following GDMT, if appropriate:  SGLT2 inhibitor [Class 2a]  ARNi or ARB [Class 2b]  Aldosterone antagonist [Class 2b]  Adjust antihypertensive therapy [Class 1]  Adjust diuretic dose at discharge if hospitalized for volume overload [Class 1]  For patient with hyperkalemia while on RAASi > 5.5, consider adding potassium binders (patiromer, sodium zirconium cycosilicate) [Class 2a]    Patients with suspected cardiac amyloidosis (older > 61years old with LVH > 1.2cm and/or any other signs of amyloidosis) should be offered screening labs and imaging [Class 1]: (a) serum gammopathy profile and UPEP with immunofixation, and (b) PYP test. If PYP test is positive patient should have genetic testing done for inherited ATTR amyloidosis. If any findings are positive or you need genetic testing ordered, please, consider in-patient consultation or referral to [address] [place] [id number]. Note that the following medications may be potentially harmful in heart failure [Class 3].   Calcium channel blockers (doxazosin, diltiazem, verapamil, nifedipine)  Antiarrhythmics (flecanide, disopyrimide, sotalol, dronedarone)  Diabetes medications (thiasolidinediones, saxagliptin, alogliptin)  NSAIDs and VAN 2 inhibitors    Consider vaccinations for respiratory illnesses (flu, pneumonia, covid) [Class 2b]        Patient Heart Failure Education:     Teach back in heart failure education provided, including information about medical therapy, lifestyle modifications, diet and fluid restrictions, physical activity. Educational resources provided: Living with Heart Failure booklet; Signs/Symptoms magnet; Weight Calendar; Dispatch Health flyer; Preparing for Successful Discharge check list.  Information provided about HF support group. Heart failure avoiding triggers on discharge instructions. Plan for Transitional Care:    Post discharge follow up phone call to be made within 48-72 hours of discharge. Patient will follow-up with PCP. Patient will follow-up with Primary Cardiologist.  Patient screened for obstructive sleep apnea and referred to Sleep Medicine. Referral/follow-up with Cardiac Rehabilitation. Referral/follow-up with Advanced Heart Failure Specialist.  Referral/follow-up with Palliative Care Specialist.        Heart Failure Nurse Navigator  Phone: [phone] / [phone]    *Recommendations listed above are based on the guidelines: 2022 AHA/ACC/HFSA Guideline for the Management of Heart Failure: A Report of the [facility] on Clinical Practice Guidelines. Circulation 2022; [id number]. and 2017 AHA/ACC/HRS guideline for management of patients with ventricular arrhythmias and the prevention of sudden cardiac death: A Report of the [employer] of Cardiology/American Heart Association Task Force on Clinical Practice Guidelines and the Heart Rhythm Society.  Heart Rhythm, Vol 15, No 10, October 2018 *Class of Recommendation: Class 1 (strong), Class 2a (moderate), Class 2b (weak), Class 3 (not recommended, potentially harmful)

## 2022-10-21 NOTE — DIALYSIS
Peritoneal Dialysis Disconnection / [phone]         Metrics   I-Drain: 48ml   Total UF: 825ml   Last Fill: 0ml   Last Manual Drain: 0ml   Net UF:         873ml   Avg Dwell Time: 1:46   Lost Dwell Time: 0:12   Alarms: N/A   Effluent: Clear/yellow          Access   Type & Location: Mid abdomen PD Tillman catheter   Comments: Prior to disconnection, one-minute Alcavis scrub performed, followed by one minute soak per policy. Sterile mini cap applied and secured to abdomen. Dsg clean, dry and intact. Dsg date:10/20/22                                           Comments: At bedside to disconnect following completion of treatment. No alarms present, tx completed without incident. Time out complete @ 0345.  Cassette disposed of in red biohazard bin

## 2022-10-21 NOTE — PROGRESS NOTES
Physician Progress Note      Tereza Caal  CSN #:                  [id number]  :                       1955  ADMIT DATE:       10/19/2022 8:11 PM  [address] [place] DATE:  RESPONDING  PROVIDER #:        BARTOLOME Greene MD          QUERY TEXT:      Dear attending,    Pt noted to have documentation of pulmonary edema per the nephrologist. The patient has a history of HF per the H&P and has a NT- pro bnp 8161 upon admission. The patient?s cxr showed mild interstitial pulmonary edema. If possible, please document in the progress notes and discharge summary if you are evaluating and/or treating any of the following: The medical record reflects the following:  Risk Factors: Hx. CAD, HTN, CHF on Coreg 25 mg po bid,  Clinical Indicators: 10/19-NT- pro bnp 8161  21-Echo- LV: Estimated LVEF is 60 - 65%. Visually measured ejection fraction. Normal cavity size, wall thickness and systolic function (ejection fraction normal). Wall motion: normal. Mild (grade 1) left ventricular diastolic dysfunction. 10/20-per nephrology- Chest x-ray with mild interstitial pulmonary edema  Presented to the ER complaining of shortness of breath and generalized weakness that has gotten progressively worse over the last 2-days.   Treatment: Coreg 25 mg po bid,Monitor vital signs, labwork, imaging, pulse oximetry, I&O, weight, oxygen      Thank you,    Pearl Alberts RN, BSN, CRCR, CCDS  Clinical Documentation Improvement  [phone] or via Perfect Serve  Options provided:  -- Noncardiogenic acute pulmonary edema due to fluid overload  -- Acute pulmonary edema due to diastolic heart failure  -- Chronic pulmonary edema due to diastolic heart failure  -- Other - I will add my own diagnosis  -- Disagree - Not applicable / Not valid  -- Disagree - Clinically unable to determine / Unknown  -- Refer to Clinical Documentation Reviewer    PROVIDER RESPONSE TEXT:    Acute pulmonary edema, likely multifactorial due to ESRD, diastolic heart failure    Query created by: Reymundo Avila on 10/21/2022 7:08 AM      Electronically signed by:  Abhilash Narayanan MD 10/21/2022 7:47 AM

## 2022-10-21 NOTE — PROGRESS NOTES
Nephrology Progress Note  Forestine Grow  Date of Admission : 10/19/2022    CC:  Follow up for ESRD       Assessment and Plan     ESRD on PD:  - CCPD pt at [address] with current Rx, all 2.5% solutions     Hypokalemia:  - resolved     Anemia of chronic disease:  - PRBCs 10/29  - cont MADY MWF  - check stool for occult blood    Neck pain:  - for MRI w/o alexandro today per hospitalist    COPD  DM2  CAD       Interval History:  Seen and examined. No issues with PD overnight. K and hgb better. Pt having severe neck pain. For MRI w/o alexandro today. No cp or sob. Current Medications: all current  Medications have been eviewed in EPIC  Review of Systems: Pertinent items are noted in HPI. Objective:  Vitals:    Vitals:    10/21/22 0546 10/21/22 0753 10/21/22 0912 10/21/22 1000   BP:  (!) 157/75 (!) 163/77    Pulse: 89 90 92 88   Resp:  13     Temp:  99 °F (37.2 °C)     SpO2:  100%     Weight:  95 kg (209 lb 7 oz)     Height:         Intake and Output:  No intake/output data recorded. 10/19 1901 - 10/21 0700  In: 396.3   Out: 766     Physical Examination:  General: NAD,Conversant   Neck:  Supple, no mass  Resp:  Lungs CTA B/L, no wheezing , normal respiratory effort  CV:  RRR,  no murmur or rub,trace LE edema  GI:  Soft, NT, + Bowel sounds, PD exit site clean  Neurologic:  Non focal  Psych:             AAO x 3 appropriate affect   Skin:  No Rash    []    High complexity decision making was performed  []    Patient is at high-risk of decompensation with multiple organ involvement    Lab Data Personally Reviewed: I have reviewed all the pertinent labs, microbiology data and radiology studies during assessment.     Recent Labs     10/21/22  0815 10/20/22  0528 10/19/22  1337    141 138   K 4.2 3.4* 3.8    105 103   CO2 26 27 27   * 212* 246*   BUN 28* 29* 28*   CREA 6.17* 6.27* 6.52*   CA 7.8* 7.6* 7.5*   PHOS 5.4* 4.3  --    ALB 2.7* 2.8* 2.9*   ALT  --   --  11*       Recent Labs 10/21/22  0815 10/20/22  0730 10/19/22  1337   WBC 7.0 7.1 8.0   HGB 8.6* 7.6* 6.6*   HCT 27.4* 24.2* 21.7*    236 253       No results found for: SDES  Lab Results   Component Value Date/Time    Culture result: MIXED UROGENITAL STANLEY ISOLATED 05/17/2022 09:52 AM    Culture result: NO GROWTH 5 DAYS 05/15/2022 03:51 AM    Culture result: NO GROWTH 5 DAYS 03/15/2018 06:09 PM     Recent Results (from the past 24 hour(s))   GLUCOSE, POC    Collection Time: 10/20/22 12:18 PM   Result Value Ref Range    Glucose (POC) 310 (H) 65 - 117 mg/dL    Performed by [address] [place] [address] WITH EAG    Collection Time: 10/20/22  1:34 PM   Result Value Ref Range    Hemoglobin A1c 8.4 (H) 4.0 - 5.6 %    Est. average glucose 194 mg/dL   GLUCOSE, POC    Collection Time: 10/20/22  4:40 PM   Result Value Ref Range    Glucose (POC) 187 (H) 65 - 117 mg/dL    Performed by Marcella Bailey, POC    Collection Time: 10/20/22  5:56 PM   Result Value Ref Range    Glucose (POC) 197 (H) 65 - 117 mg/dL    Performed by Lamberto Arce    GLUCOSE, POC    Collection Time: 10/20/22 10:16 PM   Result Value Ref Range    Glucose (POC) 198 (H) 65 - 117 mg/dL    Performed by Annemarie Schilling    RENAL FUNCTION PANEL    Collection Time: 10/21/22  8:15 AM   Result Value Ref Range    Sodium 141 136 - 145 mmol/L    Potassium 4.2 3.5 - 5.1 mmol/L    Chloride 107 97 - 108 mmol/L    CO2 26 21 - 32 mmol/L    Anion gap 8 5 - 15 mmol/L    Glucose 151 (H) 65 - 100 mg/dL    BUN 28 (H) 6 - 20 MG/DL    Creatinine 6.17 (H) 0.55 - 1.02 MG/DL    BUN/Creatinine ratio 5 (L) 12 - 20      eGFR 7 (L) >60 ml/min/1.73m2    Calcium 7.8 (L) 8.5 - 10.1 MG/DL    Phosphorus 5.4 (H) 2.6 - 4.7 MG/DL    Albumin 2.7 (L) 3.5 - 5.0 g/dL   CBC WITH AUTOMATED DIFF    Collection Time: 10/21/22  8:15 AM   Result Value Ref Range    WBC 7.0 3.6 - 11.0 K/uL    RBC 2.83 (L) 3.80 - 5.20 M/uL    HGB 8.6 (L) 11.5 - 16.0 g/dL    HCT 27.4 (L) 35.0 - 47.0 %    MCV 96.8 80.0 - 99.0 FL    MCH 30.4 26.0 - 34.0 PG    MCHC 31.4 30.0 - 36.5 g/dL    RDW 17.1 (H) 11.5 - 14.5 %    PLATELET 502 753 - 604 K/uL    MPV 9.5 8.9 - 12.9 FL    NRBC 0.0 0  WBC    ABSOLUTE NRBC 0.00 0.00 - 0.01 K/uL    NEUTROPHILS 55 32 - 75 %    LYMPHOCYTES 31 12 - 49 %    MONOCYTES 8 5 - 13 %    EOSINOPHILS 5 0 - 7 %    BASOPHILS 1 0 - 1 %    IMMATURE GRANULOCYTES 0 0.0 - 0.5 %    ABS. NEUTROPHILS 3.9 1.8 - 8.0 K/UL    ABS. LYMPHOCYTES 2.2 0.8 - 3.5 K/UL    ABS. MONOCYTES 0.6 0.0 - 1.0 K/UL    ABS. EOSINOPHILS 0.3 0.0 - 0.4 K/UL    ABS. BASOPHILS 0.1 0.0 - 0.1 K/UL    ABS. IMM. GRANS. 0.0 0.00 - 0.04 K/UL    DF AUTOMATED     GLUCOSE, POC    Collection Time: 10/21/22  8:55 AM   Result Value Ref Range    Glucose (POC) 170 (H) 65 - 117 mg/dL    Performed by Caren Pena MD  [address]  Phone - [phone]   Fax - [phone]  [email or web address]

## 2022-10-22 LAB
ANION GAP SERPL CALC-SCNC: 9 MMOL/L (ref 5–15)
BASOPHILS # BLD: 0.1 K/UL (ref 0–0.1)
BASOPHILS NFR BLD: 1 % (ref 0–1)
BUN SERPL-MCNC: 33 MG/DL (ref 6–20)
BUN/CREAT SERPL: 5 (ref 12–20)
CALCIUM SERPL-MCNC: 8.2 MG/DL (ref 8.5–10.1)
CHLORIDE SERPL-SCNC: 104 MMOL/L (ref 97–108)
CO2 SERPL-SCNC: 26 MMOL/L (ref 21–32)
CREAT SERPL-MCNC: 6.33 MG/DL (ref 0.55–1.02)
DIFFERENTIAL METHOD BLD: ABNORMAL
EOSINOPHIL # BLD: 0.3 K/UL (ref 0–0.4)
EOSINOPHIL NFR BLD: 5 % (ref 0–7)
ERYTHROCYTE [DISTWIDTH] IN BLOOD BY AUTOMATED COUNT: 16.8 % (ref 11.5–14.5)
GLUCOSE BLD STRIP.AUTO-MCNC: 210 MG/DL (ref 65–117)
GLUCOSE BLD STRIP.AUTO-MCNC: 219 MG/DL (ref 65–117)
GLUCOSE BLD STRIP.AUTO-MCNC: 225 MG/DL (ref 65–117)
GLUCOSE BLD STRIP.AUTO-MCNC: 282 MG/DL (ref 65–117)
GLUCOSE SERPL-MCNC: 212 MG/DL (ref 65–100)
HCT VFR BLD AUTO: 26.9 % (ref 35–47)
HGB BLD-MCNC: 8.3 G/DL (ref 11.5–16)
IMM GRANULOCYTES # BLD AUTO: 0 K/UL (ref 0–0.04)
IMM GRANULOCYTES NFR BLD AUTO: 0 % (ref 0–0.5)
LYMPHOCYTES # BLD: 2.3 K/UL (ref 0.8–3.5)
LYMPHOCYTES NFR BLD: 34 % (ref 12–49)
MCH RBC QN AUTO: 30.3 PG (ref 26–34)
MCHC RBC AUTO-ENTMCNC: 30.9 G/DL (ref 30–36.5)
MCV RBC AUTO: 98.2 FL (ref 80–99)
MONOCYTES # BLD: 0.6 K/UL (ref 0–1)
MONOCYTES NFR BLD: 9 % (ref 5–13)
NEUTS SEG # BLD: 3.4 K/UL (ref 1.8–8)
NEUTS SEG NFR BLD: 51 % (ref 32–75)
NRBC # BLD: 0 K/UL (ref 0–0.01)
NRBC BLD-RTO: 0 PER 100 WBC
PLATELET # BLD AUTO: 233 K/UL (ref 150–400)
PMV BLD AUTO: 9.3 FL (ref 8.9–12.9)
POTASSIUM SERPL-SCNC: 3.9 MMOL/L (ref 3.5–5.1)
RBC # BLD AUTO: 2.74 M/UL (ref 3.8–5.2)
SERVICE CMNT-IMP: ABNORMAL
SODIUM SERPL-SCNC: 139 MMOL/L (ref 136–145)
WBC # BLD AUTO: 6.7 K/UL (ref 3.6–11)

## 2022-10-22 PROCEDURE — 74011636637 HC RX REV CODE- 636/637: Performed by: FAMILY MEDICINE

## 2022-10-22 PROCEDURE — 90945 DIALYSIS ONE EVALUATION: CPT

## 2022-10-22 PROCEDURE — 74011250637 HC RX REV CODE- 250/637: Performed by: FAMILY MEDICINE

## 2022-10-22 PROCEDURE — 74011250637 HC RX REV CODE- 250/637: Performed by: HOSPITALIST

## 2022-10-22 PROCEDURE — 74011636637 HC RX REV CODE- 636/637: Performed by: HOSPITALIST

## 2022-10-22 PROCEDURE — 65270000046 HC RM TELEMETRY

## 2022-10-22 PROCEDURE — 77010033678 HC OXYGEN DAILY

## 2022-10-22 PROCEDURE — 80048 BASIC METABOLIC PNL TOTAL CA: CPT

## 2022-10-22 PROCEDURE — 74011250636 HC RX REV CODE- 250/636: Performed by: NURSE PRACTITIONER

## 2022-10-22 PROCEDURE — 36415 COLL VENOUS BLD VENIPUNCTURE: CPT

## 2022-10-22 PROCEDURE — 82962 GLUCOSE BLOOD TEST: CPT

## 2022-10-22 PROCEDURE — 85025 COMPLETE CBC W/AUTO DIFF WBC: CPT

## 2022-10-22 PROCEDURE — A4726 DIALYS SOL FLD VOL > 5999CC: HCPCS | Performed by: NURSE PRACTITIONER

## 2022-10-22 PROCEDURE — 74011000250 HC RX REV CODE- 250: Performed by: FAMILY MEDICINE

## 2022-10-22 RX ORDER — DIPHENHYDRAMINE HCL 25 MG
25 CAPSULE ORAL
Status: DISCONTINUED | OUTPATIENT
Start: 2022-10-22 | End: 2022-10-25 | Stop reason: HOSPADM

## 2022-10-22 RX ORDER — HYDROXYZINE 25 MG/1
25 TABLET, FILM COATED ORAL
Status: DISCONTINUED | OUTPATIENT
Start: 2022-10-22 | End: 2022-10-25 | Stop reason: HOSPADM

## 2022-10-22 RX ADMIN — SODIUM CHLORIDE, PRESERVATIVE FREE 10 ML: 5 INJECTION INTRAVENOUS at 21:59

## 2022-10-22 RX ADMIN — IPRATROPIUM BROMIDE AND ALBUTEROL SULFATE 3 ML: .5; 3 SOLUTION RESPIRATORY (INHALATION) at 11:44

## 2022-10-22 RX ADMIN — Medication 15 UNITS: at 17:24

## 2022-10-22 RX ADMIN — HYDROXYZINE HYDROCHLORIDE 25 MG: 25 TABLET, FILM COATED ORAL at 14:37

## 2022-10-22 RX ADMIN — GENTAMICIN SULFATE: 1 OINTMENT TOPICAL at 20:00

## 2022-10-22 RX ADMIN — SODIUM CHLORIDE, SODIUM LACTATE, CALCIUM CHLORIDE, MAGNESIUM CHLORIDE AND DEXTROSE 6000 ML: 2.5; 538; 448; 18.3; 5.08 INJECTION, SOLUTION INTRAPERITONEAL at 14:10

## 2022-10-22 RX ADMIN — HYDRALAZINE HYDROCHLORIDE 50 MG: 50 TABLET, FILM COATED ORAL at 09:30

## 2022-10-22 RX ADMIN — Medication 3 UNITS: at 11:41

## 2022-10-22 RX ADMIN — OXYCODONE AND ACETAMINOPHEN 1 TABLET: 5; 325 TABLET ORAL at 22:54

## 2022-10-22 RX ADMIN — ROSUVASTATIN 10 MG: 10 TABLET, FILM COATED ORAL at 21:59

## 2022-10-22 RX ADMIN — OXYCODONE AND ACETAMINOPHEN 1 TABLET: 5; 325 TABLET ORAL at 11:41

## 2022-10-22 RX ADMIN — Medication 3 UNITS: at 22:54

## 2022-10-22 RX ADMIN — Medication 3 UNITS: at 09:30

## 2022-10-22 RX ADMIN — CARVEDILOL 25 MG: 12.5 TABLET, FILM COATED ORAL at 17:24

## 2022-10-22 RX ADMIN — Medication 3 UNITS: at 17:24

## 2022-10-22 RX ADMIN — MONTELUKAST 10 MG: 10 TABLET, FILM COATED ORAL at 21:59

## 2022-10-22 RX ADMIN — SODIUM CHLORIDE, PRESERVATIVE FREE 10 ML: 5 INJECTION INTRAVENOUS at 09:30

## 2022-10-22 RX ADMIN — ASPIRIN 325 MG ORAL TABLET 325 MG: 325 PILL ORAL at 09:30

## 2022-10-22 RX ADMIN — CARVEDILOL 25 MG: 12.5 TABLET, FILM COATED ORAL at 09:30

## 2022-10-22 RX ADMIN — OXYCODONE AND ACETAMINOPHEN 1 TABLET: 5; 325 TABLET ORAL at 18:42

## 2022-10-22 RX ADMIN — HYDRALAZINE HYDROCHLORIDE 50 MG: 50 TABLET, FILM COATED ORAL at 21:59

## 2022-10-22 RX ADMIN — HYDRALAZINE HYDROCHLORIDE 50 MG: 50 TABLET, FILM COATED ORAL at 17:24

## 2022-10-22 NOTE — DIALYSIS
Héctor Ramin [facility] [phone]     Pt orders, notes, labs, code status and consent reviewed. Time out complete. Tx end: 05:45  Last fill:  0  I drain:  3  Total UF:  523  Net output:  526  Effluent: clear yellow     Avg dwell time: 1:45  Total lost time: :19     Cleansed with Alcavis for one minute followed by sterile mini cap application. Line secured to abdomen. Old cassette/bags discarded in red biohazard. Education pre/post. Bed in lowest position, call bell and personal belongings within reach  SBAR to primary nurse.

## 2022-10-22 NOTE — PROGRESS NOTES
Problem: Falls - Risk of  Goal: *Absence of Falls  Description: Document Diane Montiel Fall Risk and appropriate interventions in the flowsheet.   Outcome: Progressing Towards Goal  Note: Fall Risk Interventions:  Mobility Interventions: Bed/chair exit alarm, Patient to call before getting OOB         Medication Interventions: Teach patient to arise slowly    Elimination Interventions: Call light in reach    History of Falls Interventions: Bed/chair exit alarm

## 2022-10-22 NOTE — PROGRESS NOTES
[facility] Adult  Hospitalist Group                                                                                          Hospitalist Progress Note  Brionna Santos MD  Answering service: [phone] from in house phone        Date of Service:  10/22/2022  NAME:  Rojas Tolbert  :  1955  MRN:  [id number]      Admission Summary:     Rojas Tolbert is a 75 y.o. female with a pmhx CAD s/p PCI, HF, CKD on peritoneal dialysis, COPD, DM II, GERD, and hypertension, who presents with left sided neck pain, generalized weakness, and shortness of breath. She states that she has acute onset left side pain of the face radiation down her left arm and to her left sail. Pain improves with hydrocodone. In the ED, she was mildly tachycardic to 103, and hypertensive to the 170/90's. Labs are significant for macrocytic anemia with Hgb 6.6 (b/l 7-9), glucose 246, BUN 28, creatinine 6.52,  albumin 2.9, troponin 64>72, and probnp 8,161. CXR has been ordered, and is pending. In the Ed, she received percocet       Interval history / Subjective:     He stated that she has left-sided neck pain and numbness radiating to her shoulder and and left arm. No left-sided chest pain, palpitation or difficulty of breathing     Assessment & Plan:     ESRD on PD   -continue PD per nephrologist  -Monitor electrolytes  -nephrologist on board    Symptomatic anemia of chronic disease   -Hgb 8.3  -No evidence of active bleeding  -Monitor H/H    Severe left side neck pain  -MRI of cervical spine showed mild to moderate spinal canal stenosis and severe left neural foraminal stenosis at C3-C4.   Mild spinal canal stenosis and severe left neural foraminal stenosis at C4-C5  -Continue as needed Tylenol and Tintah  -Consult spine surgeon on Monday    COPD  -SpO2 93-95% on RA    Hx of CAD   -on asprin, coreg, and lipitor    T2DM  -A1c 8.4  -continue sliding scale and monitor finger stick glucose     HTN  -BP normal, continue carvedilol, hydralazine and monitor blood pressure    Dyslipidemia   -continue lipitor    Bilateral parotid masses on MRI   -Patient aware about her bilateral parotid masses and saw ENT, had a biopsy and she was told that it is benign  -Patient is advised to follow-up as an outpatient with ENT       Code status: Full Code   Prophylaxis: SCD  Care Plan discussed with: Patient and Nurse   Anticipated Disposition:      Hospital Problems  Date Reviewed: 1/18/2022            Codes Class Noted POA    Symptomatic anemia ICD-10-CM: D64.9  ICD-9-CM: 285.9  10/19/2022 Unknown           Vital Signs:    Last 24hrs VS reviewed since prior progress note. Most recent are:  Visit Vitals  /63 (BP 1 Location: Left upper arm, BP Patient Position: At rest)   Pulse 87   Temp 97.8 °F (36.6 °C)   Resp 18   Ht 5' 4\" (1.626 m)   Wt 95 kg (209 lb 7 oz)   SpO2 93%   Breastfeeding No   BMI 35.95 kg/m²         Intake/Output Summary (Last 24 hours) at 10/22/2022 1343  Last data filed at 10/22/2022 4525  Gross per 24 hour   Intake --   Output 526 ml   Net -526 ml        Physical Examination:     I had a face to face encounter with this patient and independently examined them on 10/22/2022 as outlined below:          Constitutional:  No acute distress, cooperative, pleasant    ENT: Bilateral palpable nontender parotid mass, oral mucosa moist, oropharynx benign. Resp:  CTA bilaterally. No wheezing/rhonchi/rales. No accessory muscle use. CV:  Regular rhythm, normal rate, no murmurs, gallops, rubs    GI:  Soft, non distended, non tender. normoactive bowel sounds, no hepatosplenomegaly     Musculoskeletal: Bilateral pretibial and pedal edema    Neurologic: Conscious and alert, moves all extremities.   AAOx3, CN II-XII reviewed            Data Review:    Review and/or order of clinical lab test  Review and/or order of tests in the radiology section of CPT  Review and/or order of tests in the medicine section of CPT      Labs:     Recent Labs     10/22/22  0337 10/21/22  0815   WBC 6.7 7.0   HGB 8.3* 8.6*   HCT 26.9* 27.4*    252     Recent Labs     10/22/22  0337 10/21/22  0815 10/20/22  0528    141 141   K 3.9 4.2 3.4*    107 105   CO2 26 26 27   BUN 33* 28* 29*   CREA 6.33* 6.17* 6.27*   * 151* 212*   CA 8.2* 7.8* 7.6*   PHOS  --  5.4* 4.3     Recent Labs     10/21/22  0815 10/20/22  0528   ALB 2.7* 2.8*     No results for input(s): INR, PTP, APTT, INREXT in the last 72 hours. Recent Labs     10/19/22  1833   TIBC 193*   PSAT 18*   FERR 811*      Lab Results   Component Value Date/Time    Folate 5.2 10/19/2022 06:33 PM      No results for input(s): PH, PCO2, PO2 in the last 72 hours. No results for input(s): CPK, CKNDX, TROIQ in the last 72 hours.     No lab exists for component: CPKMB  Lab Results   Component Value Date/Time    Cholesterol, total 260 (H) 07/02/2018 04:27 AM    HDL Cholesterol 31 07/02/2018 04:27 AM    LDL, calculated 197.2 (H) 07/02/2018 04:27 AM    Triglyceride 159 (H) 07/02/2018 04:27 AM    CHOL/HDL Ratio 8.4 (H) 07/02/2018 04:27 AM     Lab Results   Component Value Date/Time    Glucose (POC) 219 (H) 10/22/2022 11:29 AM    Glucose (POC) 210 (H) 10/22/2022 08:18 AM    Glucose (POC) 251 (H) 10/21/2022 11:19 PM    Glucose (POC) 224 (H) 10/21/2022 07:35 PM    Glucose (POC) 224 (H) 10/21/2022 04:17 PM     Lab Results   Component Value Date/Time    Color YELLOW/STRAW 12/02/2021 11:00 PM    Appearance CLEAR 12/02/2021 11:00 PM    Specific gravity 1.025 12/02/2021 11:00 PM    Specific gravity 1.010 06/08/2011 12:00 AM    pH (UA) 5.5 12/02/2021 11:00 PM    Protein 300 (A) 12/02/2021 11:00 PM    Glucose 500 (A) 12/02/2021 11:00 PM    Ketone Negative 12/02/2021 11:00 PM    Bilirubin Negative 12/02/2021 11:00 PM    Urobilinogen 1.0 12/02/2021 11:00 PM    Nitrites Negative 12/02/2021 11:00 PM    Leukocyte Esterase Negative 12/02/2021 11:00 PM    Epithelial cells FEW 12/02/2021 11:00 PM Bacteria Negative 12/02/2021 11:00 PM    WBC 0-4 12/02/2021 11:00 PM    RBC 0-5 12/02/2021 11:00 PM         Medications Reviewed:     Current Facility-Administered Medications   Medication Dose Route Frequency    hydrOXYzine HCL (ATARAX) tablet 25 mg  25 mg Oral Q6H PRN    sodium chloride (NS) flush 5-40 mL  5-40 mL IntraVENous Q8H    sodium chloride (NS) flush 5-40 mL  5-40 mL IntraVENous PRN    acetaminophen (TYLENOL) tablet 650 mg  650 mg Oral Q6H PRN    Or    acetaminophen (TYLENOL) suppository 650 mg  650 mg Rectal Q6H PRN    albuterol-ipratropium (DUO-NEB) 2.5 MG-0.5 MG/3 ML  3 mL Nebulization Q4H PRN    carvediloL (COREG) tablet 25 mg  25 mg Oral BID WITH MEALS    aspirin tablet 325 mg  325 mg Oral DAILY    rosuvastatin (CRESTOR) tablet 10 mg  10 mg Oral QHS    montelukast (SINGULAIR) tablet 10 mg  10 mg Oral QHS    hydrALAZINE (APRESOLINE) tablet 50 mg  50 mg Oral TID    peritoneal dialysis DEXTROSE 2.5% (2.5 mEq/L low calcium) solution 6,000 mL  6,000 mL IntraPERitoneal QPM    peritoneal dialysis DEXTROSE 2.5% (2.5 mEq/L low calcium) solution 6,000 mL  6,000 mL IntraPERitoneal QPM    epoetin joni-epbx (RETACRIT) injection 15,000 Units  15,000 Units SubCUTAneous Q MON, WED & FRI    oxyCODONE-acetaminophen (PERCOCET) 5-325 mg per tablet 1 Tablet  1 Tablet Oral Q6H PRN    gentamicin (GARAMYCIN) 0.1 % ointment   Topical DAILY    glucose chewable tablet 16 g  4 Tablet Oral PRN    glucagon (GLUCAGEN) injection 1 mg  1 mg IntraMUSCular PRN    dextrose 10 % infusion 0-250 mL  0-250 mL IntraVENous PRN    insulin lispro (HUMALOG) injection   SubCUTAneous AC&HS    0.9% sodium chloride infusion 250 mL  250 mL IntraVENous PRN     ______________________________________________________________________  EXPECTED LENGTH OF STAY: 3d 14h  ACTUAL LENGTH OF STAY:          3                 Itz Price MD

## 2022-10-22 NOTE — DIALYSIS
Peritoneal Dialysis Initiation / [phone]           Orders   Therapy Time: 9 hrs   Cycles: 4   Fill Volume: 2,000 mls   Last Fill Volume: 0 mls    Total Volume: 8,000 mls   Solution: (2) 2.5% Dextrose Dianeal bags            Access   Type & Location: Left ABD PD Catheter   Comments:   Access site good, no drainage. Pt has no pain to palpation nor reports discomfort. Access site care performed per hospital P/P - Cleansed with ExSept, Gentamicin cream applied & dry gauze dressing changed, dated 10/21/22. Labs   HBsAg (Antigen) / date: Neg 10/20/21   HBsAb (Antibody) / date: Alondra  10/20/21   Source: [facility]            Safety:   Time Out Done:    2030   Consent obtained/signed: On file   Education: Access / Site Care      Comments: At bedside to initiate CCPD tx for the night. Orders, notes, labs, code status reviewed. Cycler settings programmed & verified. Cycler primed & tested. Prior to aseptic connection transfer set scrub/soak performed w/ alcavis for two minutes. Remained present during initial drain followed by initiation of first fill. Prior to departure, bed in lowest position, call bell and personal belongings within reach. Lines visible, secure, and connections fastened well. Education and pre/post report given to pt & primary RN.       Start time:  0845 10/21/22  Estimated End Time:  4504  10/22/22

## 2022-10-22 NOTE — DIALYSIS
Sayda Haskins [facility] [phone]     Pt orders, notes, labs, code status and consent reviewed. Time out complete. Orders: Tx time:  9  Cycles:  4  Fill Volume:  2000  Last fill:  0  Total Volume:   8000  Dianeal:  2.5%     Leftabdominal PD site cleansed with Exsept followed by Gentamycin and dry gauze dressing dated XXX, no redness or drainage at exit site. Prior to connection, one minute Alcavis scrub performed. PD Start time:  14:45  PD End time:  23:45     Remained present during initial drain followed by initiation of first fill. Upon departure, bed in lowest position, personal belongings within reach.  Education pre/post with primary RN Kirsty Huber

## 2022-10-23 LAB
COMMENT, HOLDF: NORMAL
GLUCOSE BLD STRIP.AUTO-MCNC: 191 MG/DL (ref 65–117)
GLUCOSE BLD STRIP.AUTO-MCNC: 198 MG/DL (ref 65–117)
GLUCOSE BLD STRIP.AUTO-MCNC: 211 MG/DL (ref 65–117)
GLUCOSE BLD STRIP.AUTO-MCNC: 227 MG/DL (ref 65–117)
GLUCOSE BLD STRIP.AUTO-MCNC: 244 MG/DL (ref 65–117)
SAMPLES BEING HELD,HOLD: NORMAL
SERVICE CMNT-IMP: ABNORMAL

## 2022-10-23 PROCEDURE — 74011250637 HC RX REV CODE- 250/637: Performed by: FAMILY MEDICINE

## 2022-10-23 PROCEDURE — 65270000046 HC RM TELEMETRY

## 2022-10-23 PROCEDURE — 74011000250 HC RX REV CODE- 250: Performed by: FAMILY MEDICINE

## 2022-10-23 PROCEDURE — 74011636637 HC RX REV CODE- 636/637: Performed by: FAMILY MEDICINE

## 2022-10-23 PROCEDURE — A4726 DIALYS SOL FLD VOL > 5999CC: HCPCS | Performed by: NURSE PRACTITIONER

## 2022-10-23 PROCEDURE — 82962 GLUCOSE BLOOD TEST: CPT

## 2022-10-23 PROCEDURE — 74011250636 HC RX REV CODE- 250/636: Performed by: NURSE PRACTITIONER

## 2022-10-23 PROCEDURE — 94760 N-INVAS EAR/PLS OXIMETRY 1: CPT

## 2022-10-23 PROCEDURE — 74011636637 HC RX REV CODE- 636/637: Performed by: HOSPITALIST

## 2022-10-23 RX ORDER — GENTAMICIN SULFATE 1 MG/G
CREAM TOPICAL
Status: DISCONTINUED | OUTPATIENT
Start: 2022-10-23 | End: 2022-10-25 | Stop reason: HOSPADM

## 2022-10-23 RX ADMIN — OXYCODONE AND ACETAMINOPHEN 1 TABLET: 5; 325 TABLET ORAL at 20:30

## 2022-10-23 RX ADMIN — ROSUVASTATIN 10 MG: 10 TABLET, FILM COATED ORAL at 22:37

## 2022-10-23 RX ADMIN — ASPIRIN 325 MG ORAL TABLET 325 MG: 325 PILL ORAL at 08:33

## 2022-10-23 RX ADMIN — CARVEDILOL 25 MG: 12.5 TABLET, FILM COATED ORAL at 18:00

## 2022-10-23 RX ADMIN — Medication 22 UNITS: at 18:06

## 2022-10-23 RX ADMIN — HYDRALAZINE HYDROCHLORIDE 50 MG: 50 TABLET, FILM COATED ORAL at 22:37

## 2022-10-23 RX ADMIN — SODIUM CHLORIDE, PRESERVATIVE FREE 10 ML: 5 INJECTION INTRAVENOUS at 07:03

## 2022-10-23 RX ADMIN — SODIUM CHLORIDE, SODIUM LACTATE, CALCIUM CHLORIDE, MAGNESIUM CHLORIDE AND DEXTROSE 6000 ML: 2.5; 538; 448; 18.3; 5.08 INJECTION, SOLUTION INTRAPERITONEAL at 15:58

## 2022-10-23 RX ADMIN — Medication 2 UNITS: at 18:06

## 2022-10-23 RX ADMIN — Medication 3 UNITS: at 08:41

## 2022-10-23 RX ADMIN — MONTELUKAST 10 MG: 10 TABLET, FILM COATED ORAL at 22:37

## 2022-10-23 RX ADMIN — HYDRALAZINE HYDROCHLORIDE 50 MG: 50 TABLET, FILM COATED ORAL at 08:33

## 2022-10-23 RX ADMIN — SODIUM CHLORIDE, PRESERVATIVE FREE 10 ML: 5 INJECTION INTRAVENOUS at 22:38

## 2022-10-23 RX ADMIN — OXYCODONE AND ACETAMINOPHEN 1 TABLET: 5; 325 TABLET ORAL at 06:54

## 2022-10-23 RX ADMIN — OXYCODONE AND ACETAMINOPHEN 1 TABLET: 5; 325 TABLET ORAL at 14:08

## 2022-10-23 RX ADMIN — CARVEDILOL 25 MG: 12.5 TABLET, FILM COATED ORAL at 07:03

## 2022-10-23 RX ADMIN — Medication 2 UNITS: at 22:37

## 2022-10-23 RX ADMIN — Medication 3 UNITS: at 12:49

## 2022-10-23 RX ADMIN — HYDRALAZINE HYDROCHLORIDE 50 MG: 50 TABLET, FILM COATED ORAL at 18:00

## 2022-10-23 NOTE — DIALYSIS
Peritoneal Dialysis Initiation / [phone]           Orders   Therapy Time: 9 hrs   Cycles: 4   Fill Volume: 2,000 mls   Last Fill Volume: 0 mls    Total Volume: 8,000 mls   Solution: (2) 2.5% Dextrose Dianeal bags            Access   Type & Location: Left ABD PD Catheter   Comments:   Access site good, no drainage. Pt has no pain to palpation nor reports discomfort. Access site care performed per hospital P/P - Cleansed with ExSept, Gentamicin cream applied & dry gauze dressing changed, dated 10/23/22. Labs   HBsAg (Antigen) / date: Neg 10/20/21   HBsAb (Antibody) / date: Serjio Roller  10/20/21   Source: [facility]            Safety:   Time Out Done:    6657   Consent obtained/signed: On file   Education: Access / Site Care      Comments: At bedside to initiate CCPD tx for the night. Orders, notes, labs, code status reviewed. Cycler settings programmed & verified. Cycler primed & tested. Prior to aseptic connection transfer set scrub/soak performed w/ alcavis for two minutes. Remained present during initial drain followed by initiation of first fill. Prior to departure, bed in lowest position, call bell and personal belongings within reach. Lines visible, secure, and connections fastened well. Education and pre/post report given to pt & primary RN.       Start time:  21 244  10/23/22  Estimated End Time:  0125  10/24/22

## 2022-10-23 NOTE — PROGRESS NOTES
Bedside shift change report given to Laura Pulliam RN (oncoming nurse) by David Villalobos RN (offgoing nurse). Report included the following information SBAR, Kardex, MAR, Recent Results, and Cardiac Rhythm NSR .

## 2022-10-23 NOTE — PROGRESS NOTES
[address] [facility] Adult  Hospitalist Group                                                                                          Hospitalist Progress Note  Devonte Guerin MD  Answering service: [phone] from in house phone        Date of Service:  10/23/2022  NAME:  Amanda Trevino  :  1955  MRN:  [id number]      Admission Summary:     Amanda Trevion is a 75 y.o. female with a pmhx CAD s/p PCI, HF, CKD on peritoneal dialysis, COPD, DM II, GERD, and hypertension, who presents with left sided neck pain, generalized weakness, and shortness of breath. She states that she has acute onset left side pain of the face radiation down her left arm and to her left sail. Pain improves with hydrocodone. In the ED, she was mildly tachycardic to 103, and hypertensive to the 170/90's. Labs are significant for macrocytic anemia with Hgb 6.6 (b/l 7-9), glucose 246, BUN 28, creatinine 6.52,  albumin 2.9, troponin 64>72, and probnp 8,161. CXR has been ordered, and is pending. In the Ed, she received percocet       Interval history / Subjective:     He stated that she has left-sided neck pain and numbness radiating to her shoulder and and left arm more with movement. No left-sided chest pain, palpitation or difficulty of breathing     Assessment & Plan:     ESRD on PD   -continue PD per nephrologist  -Monitor electrolytes  -nephrologist on board    Symptomatic anemia of chronic disease   -Hgb 8.3  -No evidence of active bleeding  -Monitor H/H    Severe left side neck pain  -MRI of cervical spine showed mild to moderate spinal canal stenosis and severe left neural foraminal stenosis at C3-C4.   Mild spinal canal stenosis and severe left neural foraminal stenosis at C4-C5  -Continue as needed Tylenol and [place]  -Consult spine surgeon on Monday    COPD  -not bronchospastic   -SpO2 93-95% on RA    Hx of CAD   -on asprin, coreg, and lipitor    T2DM  -A1c 8.4  -continue sliding scale and monitor finger stick glucose     HTN  -BP normal, continue carvedilol, hydralazine and monitor blood pressure    Dyslipidemia   -continue lipitor    Bilateral parotid masses on MRI   -Patient aware about her bilateral parotid masses and saw ENT, had a biopsy and she was told that it is benign  -Patient is advised to follow-up as an outpatient with ENT       Code status: Full Code   Prophylaxis: SCD  Care Plan discussed with: Patient and Nurse   Anticipated Disposition: Home with [place] Problems  Date Reviewed: 1/18/2022            Codes Class Noted POA    Symptomatic anemia ICD-10-CM: D64.9  ICD-9-CM: 285.9  10/19/2022 Unknown         Vital Signs:    Last 24hrs VS reviewed since prior progress note. Most recent are:  Visit Vitals  /64 (BP 1 Location: Left upper arm, BP Patient Position: At rest)   Pulse 86   Temp 98.2 °F (36.8 °C)   Resp 18   Ht 5' 4\" (1.626 m)   Wt 98.3 kg (216 lb 11.4 oz)   SpO2 98%   Breastfeeding No   BMI 37.20 kg/m²         Intake/Output Summary (Last 24 hours) at 10/23/2022 1416  Last data filed at 10/23/2022 0710  Gross per 24 hour   Intake --   Output 950 ml   Net -950 ml          Physical Examination:     I had a face to face encounter with this patient and independently examined them on 10/23/2022 as outlined below:          Constitutional:  No acute distress, cooperative, pleasant    ENT: Bilateral palpable nontender parotid mass, oral mucosa moist, oropharynx benign. Resp:  CTA bilaterally. No wheezing/rhonchi/rales. No accessory muscle use. CV:  Regular rhythm, normal rate, no murmurs, gallops, rubs    GI:  Soft, non distended, non tender. normoactive bowel sounds, no hepatosplenomegaly     Musculoskeletal: Bilateral pretibial and pedal edema    Neurologic: Conscious and alert, moves all extremities.   AAOx3, CN II-XII reviewed            Data Review:    Review and/or order of clinical lab test  Review and/or order of tests in the radiology section of CPT  Review and/or order of tests in the medicine section of [facility]      Labs:     Recent Labs     10/22/22  0337 10/21/22  0815   WBC 6.7 7.0   HGB 8.3* 8.6*   HCT 26.9* 27.4*    252       Recent Labs     10/22/22  0337 10/21/22  0815    141   K 3.9 4.2    107   CO2 26 26   BUN 33* 28*   CREA 6.33* 6.17*   * 151*   CA 8.2* 7.8*   PHOS  --  5.4*       Recent Labs     10/21/22  0815   ALB 2.7*       No results for input(s): INR, PTP, APTT, INREXT, INREXT in the last 72 hours. No results for input(s): FE, TIBC, PSAT, FERR in the last 72 hours. Lab Results   Component Value Date/Time    Folate 5.2 10/19/2022 06:33 PM        No results for input(s): PH, PCO2, PO2 in the last 72 hours. No results for input(s): CPK, CKNDX, TROIQ in the last 72 hours.     No lab exists for component: CPKMB  Lab Results   Component Value Date/Time    Cholesterol, total 260 (H) 07/02/2018 04:27 AM    HDL Cholesterol 31 07/02/2018 04:27 AM    LDL, calculated 197.2 (H) 07/02/2018 04:27 AM    Triglyceride 159 (H) 07/02/2018 04:27 AM    CHOL/HDL Ratio 8.4 (H) 07/02/2018 04:27 AM     Lab Results   Component Value Date/Time    Glucose (POC) 211 (H) 10/23/2022 12:05 PM    Glucose (POC) 227 (H) 10/23/2022 08:36 AM    Glucose (POC) 282 (H) 10/22/2022 09:03 PM    Glucose (POC) 225 (H) 10/22/2022 04:26 PM    Glucose (POC) 219 (H) 10/22/2022 11:29 AM     Lab Results   Component Value Date/Time    Color YELLOW/STRAW 12/02/2021 11:00 PM    Appearance CLEAR 12/02/2021 11:00 PM    Specific gravity 1.025 12/02/2021 11:00 PM    Specific gravity 1.010 06/08/2011 12:00 AM    pH (UA) 5.5 12/02/2021 11:00 PM    Protein 300 (A) 12/02/2021 11:00 PM    Glucose 500 (A) 12/02/2021 11:00 PM    Ketone Negative 12/02/2021 11:00 PM    Bilirubin Negative 12/02/2021 11:00 PM    Urobilinogen 1.0 12/02/2021 11:00 PM    Nitrites Negative 12/02/2021 11:00 PM    Leukocyte Esterase Negative 12/02/2021 11:00 PM    Epithelial cells FEW 12/02/2021 11:00 PM    Bacteria Negative 12/02/2021 11:00 PM    WBC 0-4 12/02/2021 11:00 PM    RBC 0-5 12/02/2021 11:00 PM         Medications Reviewed:     Current Facility-Administered Medications   Medication Dose Route Frequency    insulin NPH (NOVOLIN N, HUMULIN N) injection 20 Units  20 Units SubCUTAneous ACB&D    hydrOXYzine HCL (ATARAX) tablet 25 mg  25 mg Oral Q6H PRN    diphenhydrAMINE (BENADRYL) capsule 25 mg  25 mg Oral Q6H PRN    sodium chloride (NS) flush 5-40 mL  5-40 mL IntraVENous Q8H    sodium chloride (NS) flush 5-40 mL  5-40 mL IntraVENous PRN    acetaminophen (TYLENOL) tablet 650 mg  650 mg Oral Q6H PRN    Or    acetaminophen (TYLENOL) suppository 650 mg  650 mg Rectal Q6H PRN    albuterol-ipratropium (DUO-NEB) 2.5 MG-0.5 MG/3 ML  3 mL Nebulization Q4H PRN    carvediloL (COREG) tablet 25 mg  25 mg Oral BID WITH MEALS    aspirin tablet 325 mg  325 mg Oral DAILY    rosuvastatin (CRESTOR) tablet 10 mg  10 mg Oral QHS    montelukast (SINGULAIR) tablet 10 mg  10 mg Oral QHS    hydrALAZINE (APRESOLINE) tablet 50 mg  50 mg Oral TID    peritoneal dialysis DEXTROSE 2.5% (2.5 mEq/L low calcium) solution 6,000 mL  6,000 mL IntraPERitoneal QPM    peritoneal dialysis DEXTROSE 2.5% (2.5 mEq/L low calcium) solution 6,000 mL  6,000 mL IntraPERitoneal QPM    epoetin joni-epbx (RETACRIT) injection 15,000 Units  15,000 Units SubCUTAneous Q MON, WED & FRI    oxyCODONE-acetaminophen (PERCOCET) 5-325 mg per tablet 1 Tablet  1 Tablet Oral Q6H PRN    gentamicin (GARAMYCIN) 0.1 % ointment   Topical DAILY    glucose chewable tablet 16 g  4 Tablet Oral PRN    glucagon (GLUCAGEN) injection 1 mg  1 mg IntraMUSCular PRN    dextrose 10 % infusion 0-250 mL  0-250 mL IntraVENous PRN    insulin lispro (HUMALOG) injection   SubCUTAneous AC&HS    0.9% sodium chloride infusion 250 mL  250 mL IntraVENous PRN     ______________________________________________________________________  EXPECTED LENGTH OF STAY: 3d 14h  ACTUAL LENGTH OF STAY:          4 Emilee Cullen MD

## 2022-10-23 NOTE — DIALYSIS
Peritoneal Dialysis Disconnection / [phone]     Metrics   I-Drain: 121 mL   Total UF: 829 mL   Last Fill: 0 mL   Last Manual Drain: 0 mL   Net UF:         950 mL   Avg Dwell Time: 1:38   Lost Dwell Time: 0:44   Alarms: Check patient line    Effluent: Clear, yellow     Access   Type & Location: Left abd PD catheter   Comments: Prior to disconnection, one-minute Alcavis scrub performed. Sterile mini cap applied and secured to abdomen. Dsg clean, dry and intact. Dsg date: 10/22/22     Safety:   Primary Nurse Rpt Pre: DAVE Hutson RN     Comments:   Line secured to abdomen. Old cassette/bags discarded in red biohazard.  Education pre/post. Bed in lowest position, call bell and personal belongings

## 2022-10-24 LAB
ALBUMIN SERPL-MCNC: 2.7 G/DL (ref 3.5–5)
ANION GAP SERPL CALC-SCNC: 10 MMOL/L (ref 5–15)
BASOPHILS # BLD: 0.1 K/UL (ref 0–0.1)
BASOPHILS NFR BLD: 1 % (ref 0–1)
BUN SERPL-MCNC: 35 MG/DL (ref 6–20)
BUN/CREAT SERPL: 6 (ref 12–20)
CALCIUM SERPL-MCNC: 7.9 MG/DL (ref 8.5–10.1)
CHLORIDE SERPL-SCNC: 104 MMOL/L (ref 97–108)
CO2 SERPL-SCNC: 25 MMOL/L (ref 21–32)
CREAT SERPL-MCNC: 6.23 MG/DL (ref 0.55–1.02)
DIFFERENTIAL METHOD BLD: ABNORMAL
EOSINOPHIL # BLD: 0.3 K/UL (ref 0–0.4)
EOSINOPHIL NFR BLD: 5 % (ref 0–7)
ERYTHROCYTE [DISTWIDTH] IN BLOOD BY AUTOMATED COUNT: 16.3 % (ref 11.5–14.5)
GLUCOSE BLD STRIP.AUTO-MCNC: 145 MG/DL (ref 65–117)
GLUCOSE BLD STRIP.AUTO-MCNC: 173 MG/DL (ref 65–117)
GLUCOSE BLD STRIP.AUTO-MCNC: 213 MG/DL (ref 65–117)
GLUCOSE BLD STRIP.AUTO-MCNC: 220 MG/DL (ref 65–117)
GLUCOSE SERPL-MCNC: 172 MG/DL (ref 65–100)
HCT VFR BLD AUTO: 26.7 % (ref 35–47)
HGB BLD-MCNC: 8.1 G/DL (ref 11.5–16)
IMM GRANULOCYTES # BLD AUTO: 0 K/UL (ref 0–0.04)
IMM GRANULOCYTES NFR BLD AUTO: 0 % (ref 0–0.5)
LYMPHOCYTES # BLD: 2 K/UL (ref 0.8–3.5)
LYMPHOCYTES NFR BLD: 28 % (ref 12–49)
MCH RBC QN AUTO: 30.2 PG (ref 26–34)
MCHC RBC AUTO-ENTMCNC: 30.3 G/DL (ref 30–36.5)
MCV RBC AUTO: 99.6 FL (ref 80–99)
MONOCYTES # BLD: 0.6 K/UL (ref 0–1)
MONOCYTES NFR BLD: 9 % (ref 5–13)
NEUTS SEG # BLD: 3.9 K/UL (ref 1.8–8)
NEUTS SEG NFR BLD: 57 % (ref 32–75)
NRBC # BLD: 0 K/UL (ref 0–0.01)
NRBC BLD-RTO: 0 PER 100 WBC
PHOSPHATE SERPL-MCNC: 5.5 MG/DL (ref 2.6–4.7)
PLATELET # BLD AUTO: 215 K/UL (ref 150–400)
PMV BLD AUTO: 9.5 FL (ref 8.9–12.9)
POTASSIUM SERPL-SCNC: 4 MMOL/L (ref 3.5–5.1)
RBC # BLD AUTO: 2.68 M/UL (ref 3.8–5.2)
SERVICE CMNT-IMP: ABNORMAL
SODIUM SERPL-SCNC: 139 MMOL/L (ref 136–145)
WBC # BLD AUTO: 6.9 K/UL (ref 3.6–11)

## 2022-10-24 PROCEDURE — 82962 GLUCOSE BLOOD TEST: CPT

## 2022-10-24 PROCEDURE — 96372 THER/PROPH/DIAG INJ SC/IM: CPT

## 2022-10-24 PROCEDURE — 74011636637 HC RX REV CODE- 636/637: Performed by: FAMILY MEDICINE

## 2022-10-24 PROCEDURE — 74011000250 HC RX REV CODE- 250: Performed by: FAMILY MEDICINE

## 2022-10-24 PROCEDURE — 97116 GAIT TRAINING THERAPY: CPT

## 2022-10-24 PROCEDURE — 97161 PT EVAL LOW COMPLEX 20 MIN: CPT

## 2022-10-24 PROCEDURE — A4726 DIALYS SOL FLD VOL > 5999CC: HCPCS | Performed by: NURSE PRACTITIONER

## 2022-10-24 PROCEDURE — 97165 OT EVAL LOW COMPLEX 30 MIN: CPT

## 2022-10-24 PROCEDURE — 74011250637 HC RX REV CODE- 250/637: Performed by: INTERNAL MEDICINE

## 2022-10-24 PROCEDURE — 97530 THERAPEUTIC ACTIVITIES: CPT

## 2022-10-24 PROCEDURE — 85025 COMPLETE CBC W/AUTO DIFF WBC: CPT

## 2022-10-24 PROCEDURE — 74011250636 HC RX REV CODE- 250/636: Performed by: NURSE PRACTITIONER

## 2022-10-24 PROCEDURE — 36415 COLL VENOUS BLD VENIPUNCTURE: CPT

## 2022-10-24 PROCEDURE — 74011250637 HC RX REV CODE- 250/637: Performed by: FAMILY MEDICINE

## 2022-10-24 PROCEDURE — 65270000046 HC RM TELEMETRY

## 2022-10-24 PROCEDURE — 74011636637 HC RX REV CODE- 636/637: Performed by: HOSPITALIST

## 2022-10-24 PROCEDURE — 90945 DIALYSIS ONE EVALUATION: CPT

## 2022-10-24 PROCEDURE — 80069 RENAL FUNCTION PANEL: CPT

## 2022-10-24 RX ORDER — CYCLOBENZAPRINE HCL 10 MG
5 TABLET ORAL
Status: DISCONTINUED | OUTPATIENT
Start: 2022-10-24 | End: 2022-10-25 | Stop reason: HOSPADM

## 2022-10-24 RX ADMIN — Medication 22 UNITS: at 17:58

## 2022-10-24 RX ADMIN — ROSUVASTATIN 10 MG: 10 TABLET, FILM COATED ORAL at 22:41

## 2022-10-24 RX ADMIN — Medication 2 UNITS: at 22:38

## 2022-10-24 RX ADMIN — CARVEDILOL 25 MG: 12.5 TABLET, FILM COATED ORAL at 17:57

## 2022-10-24 RX ADMIN — ASPIRIN 325 MG ORAL TABLET 325 MG: 325 PILL ORAL at 09:32

## 2022-10-24 RX ADMIN — Medication 3 UNITS: at 13:04

## 2022-10-24 RX ADMIN — SODIUM CHLORIDE, SODIUM LACTATE, CALCIUM CHLORIDE, MAGNESIUM CHLORIDE AND DEXTROSE 6000 ML: 2.5; 538; 448; 18.3; 5.08 INJECTION, SOLUTION INTRAPERITONEAL at 16:22

## 2022-10-24 RX ADMIN — HYDRALAZINE HYDROCHLORIDE 50 MG: 50 TABLET, FILM COATED ORAL at 17:57

## 2022-10-24 RX ADMIN — CARVEDILOL 25 MG: 12.5 TABLET, FILM COATED ORAL at 09:32

## 2022-10-24 RX ADMIN — OXYCODONE AND ACETAMINOPHEN 1 TABLET: 5; 325 TABLET ORAL at 09:32

## 2022-10-24 RX ADMIN — HYDRALAZINE HYDROCHLORIDE 50 MG: 50 TABLET, FILM COATED ORAL at 09:32

## 2022-10-24 RX ADMIN — Medication 22 UNITS: at 09:32

## 2022-10-24 RX ADMIN — Medication 2 UNITS: at 09:32

## 2022-10-24 RX ADMIN — OXYCODONE AND ACETAMINOPHEN 1 TABLET: 5; 325 TABLET ORAL at 23:58

## 2022-10-24 RX ADMIN — Medication 2 UNITS: at 17:57

## 2022-10-24 RX ADMIN — MONTELUKAST 10 MG: 10 TABLET, FILM COATED ORAL at 22:41

## 2022-10-24 RX ADMIN — SODIUM CHLORIDE, PRESERVATIVE FREE 10 ML: 5 INJECTION INTRAVENOUS at 22:42

## 2022-10-24 RX ADMIN — OXYCODONE AND ACETAMINOPHEN 1 TABLET: 5; 325 TABLET ORAL at 02:21

## 2022-10-24 RX ADMIN — OXYCODONE AND ACETAMINOPHEN 1 TABLET: 5; 325 TABLET ORAL at 17:57

## 2022-10-24 RX ADMIN — EPOETIN ALFA-EPBX 15000 UNITS: 10000 INJECTION, SOLUTION INTRAVENOUS; SUBCUTANEOUS at 22:48

## 2022-10-24 RX ADMIN — GENTAMICIN SULFATE: 1 OINTMENT TOPICAL at 18:08

## 2022-10-24 RX ADMIN — GENTAMICIN SULFATE: 1 CREAM TOPICAL at 16:21

## 2022-10-24 RX ADMIN — HYDRALAZINE HYDROCHLORIDE 50 MG: 50 TABLET, FILM COATED ORAL at 22:41

## 2022-10-24 NOTE — DIALYSIS
Peritoneal Dialysis Disconnection / [phone]     Metrics   I-Drain: 1 mL   Total UF: 752 mL   Last Fill: 0 mL   Last Manual Drain: 0 mL   Net UF:         753 mL   Avg Dwell Time: 1:47   Lost Dwell Time: 0:07   Alarms: No alarms    Effluent: Clear, yellow     Access   Type & Location: Left abd PD catheter   Comments: Prior to disconnection, one-minute Alcavis scrub performed. Sterile mini cap applied and secured to abdomen. Dsg clean, dry and intact. Dsg date: 10/23/22     Safety:   Primary Nurse Rpt Pre: AMOS Kilpatrick RN     Comments:   Line secured to abdomen. Old cassette/bags discarded in red biohazard.  Education pre/post. Bed in lowest position, call bell and personal belongings

## 2022-10-24 NOTE — PROGRESS NOTES
Transition Plan of Care  RUR 16%-Med  Disposition-clear by therapy to return home. History of ESRD on PD. [facility] Will likely discharge home with no skilled needs.

## 2022-10-24 NOTE — CONSULTS
ORTHO SPINE CONSULT NOTE    Subjective:     Date of Consultation:  2022  Referring Physician:  Yovany Bryant is a 75 y.o. female who is being seen for neck pain with associated left sided flank pain which began last week. She has had previous similar episodes in the past which have resolved on their own. She does not have an established spine surgeon. She has been treated with oxycodone here in the hospital and that has helped. She was admitted 10/19/22 for COPD exacerbation and anemia. Her neck pain is much better than she did last week but still reports left flank pain. That is her primary complaint today. She has chronic neuropathy in her hands and feet which seems to be at baseline today. An MRI of the cervical spine was done 10/21/22 and shows extensive degenerative changes and severe left-sided foraminal stenosis at C3-4 and C4-5. Spine surgery has been consulted to evaluate the above. PMH significant for CAD s/p PCI, HF, CKD on peritoneal dialysis, COPD, DM II, GERD, and hypertension.     Patient Active Problem List    Diagnosis Date Noted    Chest pain at rest 2014    ASHD (arteriosclerotic heart disease) 2014    Diabetes mellitus ([facility] 2014    Hypertension, benign 2014    ASO (arteriosclerosis obliterans) 2014    Hypercholesteremia 2014    Symptomatic anemia 10/19/2022    SOB (shortness of breath) 05/15/2022    Acute renal failure (ARF) ([facility] [facility] 2021    NSTEMI (non-ST elevated myocardial infarction) ([facility] [facility] 2019    Bilateral leg edema 03/15/2018     Family History   Problem Relation Age of Onset    Heart Disease Mother     Cancer Father       Social History     Tobacco Use    Smoking status: Former     Packs/day: 0.25     Years: 10.00     Pack years: 2.50     Types: Cigarettes     Quit date: 2021     Years since quittin.9    Smokeless tobacco: Never   Substance Use Topics    Alcohol use: No     Past Medical History:   Diagnosis Date Asthma     CAD (coronary artery disease)     CHF (congestive heart failure) ([facility])     Chronic kidney disease     HD M-W- [facility] [phone]    COPD (chronic obstructive pulmonary disease) ([facility])     Diabetes ([facility])     DM2    GERD (gastroesophageal reflux disease)     Hypertension       Past Surgical History:   Procedure Laterality Date    DELIVERY       HX CORONARY STENT PLACEMENT      HX HYSTERECTOMY      IR INSERT NON TUNL CVC OVER 5 YRS  12/3/2021    IR INSERT TUNL CVC W/O PORT OVER 5 YR  2021    [facility] CARDIAC SURG PROCEDURE UNLIST      VASCULAR SURGERY PROCEDURE UNLIST      leg stent      Prior to Admission medications    Medication Sig Start Date End Date Taking? Authorizing Provider   lactulose 10 gram/15 mL (15 mL) soln Take 30 mL by mouth two (2) times daily as needed for PRN Reason (Other) (Constipation). 22  Yes Provider, Historical   carvediloL (COREG) 25 mg tablet Take 25 mg by mouth two (2) times daily (with meals). Yes Provider, Historical   dulaglutide (Trulicity) 7.81 NP/0.6 mL sub-q pen 0.75 mg by SubCUTAneous route every seven (7) days. Yes Provider, Historical   insulin glargine (LANTUS,BASAGLAR) 100 unit/mL (3 mL) inpn 20 Units by SubCUTAneous route daily. Yes Provider, Historical   rosuvastatin (CRESTOR) 20 mg tablet Take 0.5 Tablets by mouth nightly. Take 1 Tab by mouth nightly. Dose reduced from 20 mg to 10 mg. 12/10/21  Yes Alla Escamilla MD   epoetin joni-epbx (RETACRIT) 20,000 unit/mL injection 1 mL by SubCUTAneous route every Monday, Wednesday, Friday. Indications: anemia due to kidney failure  Indications: anemia due to kidney failure 12/10/21  Yes Alla Escamilla MD   hydrALAZINE (APRESOLINE) 50 mg tablet Take 1 Tablet by mouth three (3) times daily. 12/10/21  Yes Alla Escamilla MD   mometasone-formoterol (DULERA) 200-5 mcg/actuation HFA inhaler Take 2 Puffs by inhalation two (2) times a day.    Yes Provider, Historical   albuterol-ipratropium (DUO-NEB) 2.5 mg-0.5 mg/3 ml nebu 3 mL by Nebulization route every six (6) hours as needed. Patient taking differently: 3 mL by Nebulization route every six (6) hours as needed for Shortness of Breath. 3/19/18  Yes Eliana Royal MD   montelukast (SINGULAIR) 10 mg tablet Take 1 Tab by mouth nightly. 3/19/18  Yes Eliana Royal MD   aspirin (ASPIRIN) 325 mg tablet Take 325 mg by mouth daily. Yes Other, MD Tony   insulin NPH/insulin regular (NOVOLIN 70/30 U-100 INSULIN) 100 unit/mL (70-30) injection 45 Units by SubCUTAneous route two (2) times a day.   Patient not taking: Reported on 1/18/2022 7/9/18   Eliana Royal MD     Current Facility-Administered Medications   Medication Dose Route Frequency    cyclobenzaprine (FLEXERIL) tablet 5 mg  5 mg Oral TID PRN    insulin NPH (NOVOLIN N, HUMULIN N) injection 22 Units  22 Units SubCUTAneous ACB&D    gentamicin (GARAMYCIN) 0.1 % cream   Topical DIALYSIS PRN    hydrOXYzine HCL (ATARAX) tablet 25 mg  25 mg Oral Q6H PRN    diphenhydrAMINE (BENADRYL) capsule 25 mg  25 mg Oral Q6H PRN    sodium chloride (NS) flush 5-40 mL  5-40 mL IntraVENous Q8H    sodium chloride (NS) flush 5-40 mL  5-40 mL IntraVENous PRN    acetaminophen (TYLENOL) tablet 650 mg  650 mg Oral Q6H PRN    Or    acetaminophen (TYLENOL) suppository 650 mg  650 mg Rectal Q6H PRN    albuterol-ipratropium (DUO-NEB) 2.5 MG-0.5 MG/3 ML  3 mL Nebulization Q4H PRN    carvediloL (COREG) tablet 25 mg  25 mg Oral BID WITH MEALS    aspirin tablet 325 mg  325 mg Oral DAILY    rosuvastatin (CRESTOR) tablet 10 mg  10 mg Oral QHS    montelukast (SINGULAIR) tablet 10 mg  10 mg Oral QHS    hydrALAZINE (APRESOLINE) tablet 50 mg  50 mg Oral TID    peritoneal dialysis DEXTROSE 2.5% (2.5 mEq/L low calcium) solution 6,000 mL  6,000 mL IntraPERitoneal QPM    peritoneal dialysis DEXTROSE 2.5% (2.5 mEq/L low calcium) solution 6,000 mL  6,000 mL IntraPERitoneal QPM    epoetin joni-epbx (RETACRIT) injection 15,000 Units  15,000 Units SubCUTAneous Q MON, WED & FRI    oxyCODONE-acetaminophen (PERCOCET) 5-325 mg per tablet 1 Tablet  1 Tablet Oral Q6H PRN    gentamicin (GARAMYCIN) 0.1 % ointment   Topical DAILY    glucose chewable tablet 16 g  4 Tablet Oral PRN    glucagon (GLUCAGEN) injection 1 mg  1 mg IntraMUSCular PRN    dextrose 10 % infusion 0-250 mL  0-250 mL IntraVENous PRN    insulin lispro (HUMALOG) injection   SubCUTAneous AC&HS    0.9% sodium chloride infusion 250 mL  250 mL IntraVENous PRN      Allergies   Allergen Reactions    Ivp Dye [Fd And C Blue No.1] Hives        Review of Systems:  A comprehensive review of systems was negative except for that written in the HPI. Mental Status: no dementia    Objective:     Patient Vitals for the past 8 hrs:   BP Temp Pulse Resp SpO2   10/24/22 1525 (!) 140/69 -- 91 -- 96 %   10/24/22 1400 -- -- 92 -- --   10/24/22 1200 -- -- 80 -- --   10/24/22 1120 (!) 157/67 98.6 °F (37 °C) 92 26 96 %   10/24/22 1000 -- -- 92 -- --     Temp (24hrs), Av.5 °F (36.9 °C), Min:97.8 °F (36.6 °C), Max:99.1 °F (37.3 °C)      PHYSICAL EXAM:   66yo female, pleasant/cooperative, no distress, appears stated age, alert/oriented, normal mood, patient moving neck and both upper extremities extensively throughout interview and exam without obvious discomfort, no swelling/tenderness to palpation of cervical and thoracic spine or left flank where patient indicated pain, FROM cervical spine, left shoulder/elbow/wrist/hand, strength is normal in both UE, strong radial pulses bilaterally, sensation intact to light touch in UE    Imaging Review:   MRI CERV SPINE WO CONT 10/21/2022 17:19  INDICATION:   neck pain  COMPARISON: None. TECHNIQUE: Multiplanar multisequence acquisition without contrast of the  cervical spine. CONTRAST: None. FINDINGS:  Evaluation is significantly limited by motion. Described stenoses are only  estimates. There is normal alignment of the cervical spine.  Vertebral body heights are  maintained without evidence of acute fracture. Marrow signal is heterogeneous,  but within normal limits. Multilevel degenerative changes as detailed below. The  cervical cord is normal in size and signal. Region of the foramen magnum is  unremarkable. Incompletely evaluated mass in the posterior superficial right  parotid gland measuring 1.8 x 1.7. Incompletely evaluated mass in the posterior  superficial left parotid gland measuring 2.0 x 1.1 cm.  C2-C3: No significant disc herniation, spinal canal or neural foraminal  stenosis. C3-C4: Diffuse disc osteophyte complex with left-sided uncovertebral spurring. Mild to moderate spinal canal stenosis. Severe left and no right neural  foraminal stenosis. C4-C5: Diffuse disc osteophyte complex with left-sided uncovertebral spurring. Mild spinal canal stenosis. Severe left and no right neural foraminal stenosis. C5-C6: Mild diffuse disc osteophyte complex and left-sided uncovertebral  spurring. Mild spinal canal stenosis. Mild left and no right neural foraminal  stenosis. C6-C7: Mild diffuse disc osteophyte complex. Mild spinal canal stenosis. Mild  bilateral neural foraminal stenosis. C7-T1: Mild left facet arthropathy. No significant disc herniation, spinal canal  or neural foraminal stenosis. IMPRESSION  1. Evaluation is significantly limited by motion. Describes stenoses are only  estimates. 2. Mild to moderate spinal canal stenosis and severe left neural foraminal  stenosis at C3-C4. 3. Mild spinal canal stenosis and severe left neural foraminal stenosis at  C4-C5. 4. Remaining mild degenerative changes as detailed above. 5. 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 Outpatient ENT consultation is recommended.        Labs:   Recent Results (from the past 24 hour(s))   GLUCOSE, POC    Collection Time: 10/23/22  4:43 PM   Result Value Ref Range    Glucose (POC) 198 (H) 65 - 117 mg/dL    Performed by Jazmyne Cohen GLUCOSE, POC    Collection Time: 10/23/22  9:59 PM   Result Value Ref Range    Glucose (POC) 244 (H) 65 - 117 mg/dL    Performed by [employer]    CBC WITH AUTOMATED DIFF    Collection Time: 10/24/22  3:40 AM   Result Value Ref Range    WBC 6.9 3.6 - 11.0 K/uL    RBC 2.68 (L) 3.80 - 5.20 M/uL    HGB 8.1 (L) 11.5 - 16.0 g/dL    HCT 26.7 (L) 35.0 - 47.0 %    MCV 99.6 (H) 80.0 - 99.0 FL    MCH 30.2 26.0 - 34.0 PG    MCHC 30.3 30.0 - 36.5 g/dL    RDW 16.3 (H) 11.5 - 14.5 %    PLATELET 696 403 - 448 K/uL    MPV 9.5 8.9 - 12.9 FL    NRBC 0.0 0  WBC    ABSOLUTE NRBC 0.00 0.00 - 0.01 K/uL    NEUTROPHILS 57 32 - 75 %    LYMPHOCYTES 28 12 - 49 %    MONOCYTES 9 5 - 13 %    EOSINOPHILS 5 0 - 7 %    BASOPHILS 1 0 - 1 %    IMMATURE GRANULOCYTES 0 0.0 - 0.5 %    ABS. NEUTROPHILS 3.9 1.8 - 8.0 K/UL    ABS. LYMPHOCYTES 2.0 0.8 - 3.5 K/UL    ABS. MONOCYTES 0.6 0.0 - 1.0 K/UL    ABS. EOSINOPHILS 0.3 0.0 - 0.4 K/UL    ABS. BASOPHILS 0.1 0.0 - 0.1 K/UL    ABS. IMM.  GRANS. 0.0 0.00 - 0.04 K/UL    DF AUTOMATED     RENAL FUNCTION PANEL    Collection Time: 10/24/22  3:48 AM   Result Value Ref Range    Sodium 139 136 - 145 mmol/L    Potassium 4.0 3.5 - 5.1 mmol/L    Chloride 104 97 - 108 mmol/L    CO2 25 21 - 32 mmol/L    Anion gap 10 5 - 15 mmol/L    Glucose 172 (H) 65 - 100 mg/dL    BUN 35 (H) 6 - 20 MG/DL    Creatinine 6.23 (H) 0.55 - 1.02 MG/DL    BUN/Creatinine ratio 6 (L) 12 - 20      eGFR 7 (L) >60 ml/min/1.73m2    Calcium 7.9 (L) 8.5 - 10.1 MG/DL    Phosphorus 5.5 (H) 2.6 - 4.7 MG/DL    Albumin 2.7 (L) 3.5 - 5.0 g/dL   GLUCOSE, POC    Collection Time: 10/24/22  8:36 AM   Result Value Ref Range    Glucose (POC) 173 (H) 65 - 117 mg/dL    Performed by Gala RACHEL    GLUCOSE, POC    Collection Time: 10/24/22 11:53 AM   Result Value Ref Range    Glucose (POC) 213 (H) 65 - 117 mg/dL    Performed by [employer] RACHEL          Impression:     Patient Active Problem List    Diagnosis Date Noted    Chest pain at rest 02/21/2014    ASHD (arteriosclerotic heart disease) 02/21/2014    Diabetes mellitus ([facility] [address] 02/21/2014    Hypertension, benign 02/21/2014    ASO (arteriosclerosis obliterans) 02/21/2014    Hypercholesteremia 02/21/2014    Symptomatic anemia 10/19/2022    SOB (shortness of breath) 05/15/2022    Acute renal failure (ARF) ([facility] [address] 12/02/2021    [facility]STEM[facility] (non-ST elevated [facility]) [facility] 04/25/2019    Bilateral leg edema 03/15/2018     Active Problems:    Symptomatic anemia (10/19/2022)    Cervical spine stenosis    Plan:   Discussed with attending spine surgeon, Dr. Paulie Morales. Appears to have an acute flare of underlying DDD and spinal stenosis. Has improved significantly since admission. No acute neurologic deficits or indication for further acute interventions as inpatient. She does have significant stenosis on MRI and I discussed that with patient and her daughter (via phone). Recommended starting flexeril and gabapentin. Patient declined gabapentin as this has not helped in the past. Recommend office follow-up to discuss RANDALL or decompression.     JAZ Penny

## 2022-10-24 NOTE — PROCEDURES
Ozzie Dumontrobert  /  [phone]     Orders  Therapy Time: 9 hrs   Cycles: 4   Fill Volume: 2000 mL   Last Fill Volume: 0 mL   Total Volume: 8000 mL   Solution: Dextrose Dianeal bags 2.5% x 2         Comments:   Pt orders, notes, labs, code status and consent reviewed. Time out complete. PD site care: Catheter insertion site cleansed with Exsept followed by Gentamycin application and dry gauze dressing. No redness or drainage at exit site. Prior to connection, one minute Alcavis scrub performed, followed by one minute soak. Start time: 1625  Estimated End Time: 8823    Remained present during initial drain followed by initiation of first fill. Prior to departure, bed in lowest position, call bell and personal belongings within reach. Education pre/post with patient and primary nurse, AMOS Vieira RN .

## 2022-10-24 NOTE — PROGRESS NOTES
Nephrology Progress Note  Angelina Ortez  Date of Admission : 10/19/2022    CC:  Follow up for ESRD       Assessment and Plan     ESRD on PD:  - CCPD pt at [address] with current Rx, all 2.5% solutions     Hypokalemia:  - resolved     Anemia of chronic disease:  - PRBCs 10/29  - cont MADY MWF  - check stool for occult blood - pending collection at this time    Spinal stenosis:  - surgery eval today    Parotid masses:  - follows with ENT, had bx previously  - can f/up as an outpt    COPD  DM2  CAD       Interval History:  Seen and examined. No issues with PD overnight. K and hgb better. MRI findings noted. No cp or sob. Pain better today. Current Medications: all current  Medications have been eviewed in EPIC  Review of Systems: Pertinent items are noted in HPI. Objective:  Vitals:    Vitals:    10/24/22 0353 10/24/22 0550 10/24/22 0726 10/24/22 1000   BP:   (!) 153/85    Pulse: 86 86 91 92   Resp:   15    Temp:   98.9 °F (37.2 °C)    SpO2:   98%    Weight:       Height:         Intake and Output:  10/24 0701 - 10/24 1900  In: -   Out: 269   10/22 1901 - 10/24 0700  In: -   Out: 950     Physical Examination:  General: NAD,Conversant   Neck:  Supple, no mass  Resp:  Lungs CTA B/L, no wheezing , normal respiratory effort  CV:  RRR,  no murmur or rub,trace LE edema  GI:  Soft, NT, + Bowel sounds, PD exit site clean  Neurologic:  Non focal  Psych:             AAO x 3 appropriate affect   Skin:  No Rash    []    High complexity decision making was performed  []    Patient is at high-risk of decompensation with multiple organ involvement    Lab Data Personally Reviewed: I have reviewed all the pertinent labs, microbiology data and radiology studies during assessment.     Recent Labs     10/24/22  0348 10/22/22  0337    139   K 4.0 3.9    104   CO2 25 26   * 212*   BUN 35* 33*   CREA 6.23* 6.33*   CA 7.9* 8.2*   PHOS 5.5*  --    ALB 2.7*  --        Recent Labs     10/24/22  0340 10/22/22  0337   WBC 6.9 6.7   HGB 8.1* 8.3*   HCT 26.7* 26.9*    233       No results found for: SDES  Lab Results   Component Value Date/Time    Culture result: MIXED UROGENITAL STANLEY ISOLATED 05/17/2022 09:52 AM    Culture result: NO GROWTH 5 DAYS 05/15/2022 03:51 AM    Culture result: NO GROWTH 5 DAYS 03/15/2018 06:09 PM     Recent Results (from the past 24 hour(s))   GLUCOSE, POC    Collection Time: 10/23/22 12:05 PM   Result Value Ref Range    Glucose (POC) 211 (H) 65 - 117 mg/dL    Performed by [address], POC    Collection Time: 10/23/22  2:57 PM   Result Value Ref Range    Glucose (POC) 191 (H) 65 - 117 mg/dL    Performed by Marylene Pfeiffer Jeniffer CON    GLUCOSE, POC    Collection Time: 10/23/22  4:43 PM   Result Value Ref Range    Glucose (POC) 198 (H) 65 - 117 mg/dL    Performed by Kalpesh Collins    GLUCOSE, POC    Collection Time: 10/23/22  9:59 PM   Result Value Ref Range    Glucose (POC) 244 (H) 65 - 117 mg/dL    Performed by Kalpesh Collins    CBC WITH AUTOMATED DIFF    Collection Time: 10/24/22  3:40 AM   Result Value Ref Range    WBC 6.9 3.6 - 11.0 K/uL    RBC 2.68 (L) 3.80 - 5.20 M/uL    HGB 8.1 (L) 11.5 - 16.0 g/dL    HCT 26.7 (L) 35.0 - 47.0 %    MCV 99.6 (H) 80.0 - 99.0 FL    MCH 30.2 26.0 - 34.0 PG    MCHC 30.3 30.0 - 36.5 g/dL    RDW 16.3 (H) 11.5 - 14.5 %    PLATELET 166 444 - 196 K/uL    MPV 9.5 8.9 - 12.9 FL    NRBC 0.0 0  WBC    ABSOLUTE NRBC 0.00 0.00 - 0.01 K/uL    NEUTROPHILS 57 32 - 75 %    LYMPHOCYTES 28 12 - 49 %    MONOCYTES 9 5 - 13 %    EOSINOPHILS 5 0 - 7 %    BASOPHILS 1 0 - 1 %    IMMATURE GRANULOCYTES 0 0.0 - 0.5 %    ABS. NEUTROPHILS 3.9 1.8 - 8.0 K/UL    ABS. LYMPHOCYTES 2.0 0.8 - 3.5 K/UL    ABS. MONOCYTES 0.6 0.0 - 1.0 K/UL    ABS. EOSINOPHILS 0.3 0.0 - 0.4 K/UL    ABS. BASOPHILS 0.1 0.0 - 0.1 K/UL    ABS. IMM.  GRANS. 0.0 0.00 - 0.04 K/UL    DF AUTOMATED     RENAL FUNCTION PANEL    Collection Time: 10/24/22  3:48 AM   Result Value Ref Range    Sodium 139 136 - 145 mmol/L    Potassium 4.0 3.5 - 5.1 mmol/L    Chloride 104 97 - 108 mmol/L    CO2 25 21 - 32 mmol/L    Anion gap 10 5 - 15 mmol/L    Glucose 172 (H) 65 - 100 mg/dL    BUN 35 (H) 6 - 20 MG/DL    Creatinine 6.23 (H) 0.55 - 1.02 MG/DL    BUN/Creatinine ratio 6 (L) 12 - 20      eGFR 7 (L) >60 ml/min/1.73m2    Calcium 7.9 (L) 8.5 - 10.1 MG/DL    Phosphorus 5.5 (H) 2.6 - 4.7 MG/DL    Albumin 2.7 (L) 3.5 - 5.0 g/dL   GLUCOSE, POC    Collection Time: 10/24/22  8:36 AM   Result Value Ref Range    Glucose (POC) 173 (H) 65 - 117 mg/dL    Performed by Lyly Beverly MD  [address]  Phone - [phone]   Fax - [phone]  [email or web address]

## 2022-10-24 NOTE — PROGRESS NOTES
Problem: Mobility Impaired (Adult and Pediatric)  Goal: *Acute Goals and Plan of Care (Insert Text)  Description: FUNCTIONAL STATUS PRIOR TO ADMISSION: Patient was modified independent using a rollator and single point cane for functional mobility. Uses SPC within the home, rollator out in the community, and admits to furniture walking when without cane within the home. Denies falls. She manages her own PD at night. HOME SUPPORT PRIOR TO ADMISSION: The patient lived with her son. Physical Therapy Goals  Initiated 10/24/2022  1. Patient will move from supine to sit and sit to supine , scoot up and down, and roll side to side in bed with independence within 7 day(s). 2.  Patient will transfer from bed to chair and chair to bed with modified independence using the least restrictive device within 7 day(s). 3.  Patient will perform sit to stand with modified independence within 7 day(s). 4.  Patient will ambulate with modified independence for 150 feet with the least restrictive device within 7 day(s). 5.  Patient will ascend/descend 3 stairs with right handrail(s) with modified independence within 7 day(s). Outcome: Progressing Towards Goal   PHYSICAL THERAPY EVALUATION  Patient: Saeed Jamil (25 y.o. female)  Date: 10/24/2022  Primary Diagnosis: Symptomatic anemia [D64.9]       Precautions:   Fall      ASSESSMENT  Based on the objective data described below, the patient presents with impaired balance, history of neuropathy, and decreased endurance following admission for symptomatic anemia. Also being worked up for neck/L side pain-- reports more side pain than neck pain. Overall mod I-SBA for mobility but requiring intermittent min A to correct balance with path deviations x3 during gait within the hallway. Reported SOB but SpO2 stable on room air. Educated on how hx of COPD plays a part in her decreased endurance.  Anticipate she would have improved balance with a rollator or SPC next session and be safe for d/c home without further PT follow up. Current Level of Function Impacting Discharge (mobility/balance): min A    Functional Outcome Measure: The patient scored Total Score: 15/28 on the Tinetti outcome measure which is indicative of high fall risk. Other factors to consider for discharge: lives with son     Patient will benefit from skilled therapy intervention to address the above noted impairments. PLAN :  Recommendations and Planned Interventions: bed mobility training, transfer training, gait training, therapeutic exercises, neuromuscular re-education, patient and family training/education, and therapeutic activities      Frequency/Duration: Patient will be followed by physical therapy:  3 times a week to address goals. Recommendation for discharge: (in order for the patient to meet his/her long term goals)  To be determined: likely no HHPT    This discharge recommendation:  Has been made in collaboration with the attending provider and/or case management    IF patient discharges home will need the following DME: patient owns DME required for discharge         SUBJECTIVE:   Patient stated \"I told you I'm wobbly.     OBJECTIVE DATA SUMMARY:   HISTORY:    Past Medical History:   Diagnosis Date    Asthma     CAD (coronary artery disease)     CHF (congestive heart failure) ([facility])     Chronic kidney disease     HD - Radu Gardnerrico [phone]    COPD (chronic obstructive pulmonary disease) ([facility])     Diabetes ([facility])     DM2    GERD (gastroesophageal reflux disease)     Hypertension      Past Surgical History:   Procedure Laterality Date    DELIVERY       HX CORONARY STENT PLACEMENT      HX HYSTERECTOMY      IR INSERT NON TUNL CVC OVER 5 YRS  12/3/2021    IR INSERT TUNL CVC W/O PORT OVER 5 YR  2021    [facility] CARDIAC SURG PROCEDURE UNLIST      VASCULAR SURGERY PROCEDURE UNLIST      leg stent       Personal factors and/or comorbidities impacting plan of care: [address] [place] [address] Dr KRAMER Situation  Home Environment: Private residence  # Steps to Enter: 3  Rails to Enter: Yes  Hand Rails : Bilateral  One/Two Story Residence: One story  Living Alone: No  Support Systems: Child(mansoor)  Patient Expects to be Discharged to[de-identified] Home  Current DME Used/Available at Home: Bulah Found, straight, Walker, rollator, Nebulizer, Shower chair  Tub or Shower Type: Tub/Shower combination    EXAMINATION/PRESENTATION/DECISION MAKING:   Critical Behavior:  Neurologic State: Alert  Orientation Level: Oriented X4  Cognition: Follows commands  Hearing: Auditory  Auditory Impairment: Hard of hearing, bilateral  Range Of Motion:  AROM: Within functional limits  Strength:    Strength: Within functional limits  Tone & Sensation:   Tone: Normal  Sensation: Impaired (numbness/tingling hands and feet)  Functional Mobility:  Bed Mobility:  Supine to Sit: Supervision  Scooting: Supervision  Transfers:  Sit to Stand: Stand-by assistance  Stand to Sit: Stand-by assistance  Balance:   Sitting: Intact  Standing: Impaired; Without support  Standing - Static: Good  Standing - Dynamic : Fair  Ambulation/Gait Training:  Distance (ft): 60 Feet (ft)  Assistive Device: Gait belt  Ambulation - Level of Assistance: Stand-by assistance;Minimal assistance  Gait Abnormalities: Path deviations;Decreased step clearance  Base of Support: Widened  Speed/Maryann: Pace decreased (<100 feet/min)  Step Length: Right shortened;Left shortened      Functional Measure:  Tinetti test:    Sitting Balance: 1  Arises: 1  Attempts to Rise: 2  Immediate Standing Balance: 2  Standing Balance: 1  Nudged: 0  Eyes Closed: 0  Turn 360 Degrees - Continuous/Discontinuous: 0  Turn 360 Degrees - Steady/Unsteady: 0  Sitting Down: 1  Balance Score: 8 Balance total score  Indication of Gait: 1  R Step Length/Height: 1  L Step Length/Height: 1  R Foot Clearance: 1  L Foot Clearance: 1  Step Symmetry: 1  Step Continuity: 1  Path: 0  Trunk: 0  Walking Time: 0  Gait Score: 7 Gait total score  Total Score: 15/28 Overall total score         Tinetti Tool Score Risk of Falls  <19 = High Fall Risk  19-24 = Moderate Fall Risk  25-28 = Low Fall Risk  Tinetti ME. Performance-Oriented Assessment of Mobility Problems in Elderly Patients. [place] 66; [id number]. (Scoring Description: PT Bulletin Feb. 10, 1993)    Older adults: Sondra Lema et al, 2009; n = 1000 [facility] elderly evaluated with ABC, LIANNE, ADL, and IADL)  · Mean LIANNE score for males aged 69-68 years = 26.21(3.40)  · Mean LIANNE score for females age 69-68 years = 25.16(4.30)  · Mean LIANNE score for males over 80 years = 23.29(6.02)  · Mean LIANNE score for females over 80 years = 17.20(8.32)        Physical Therapy Evaluation Charge Determination   History Examination Presentation Decision-Making   HIGH Complexity :3+ comorbidities / personal factors will impact the outcome/ POC  HIGH Complexity : 4+ Standardized tests and measures addressing body structure, function, activity limitation and / or participation in recreation  LOW Complexity : Stable, uncomplicated  Other outcome measures Tinetti 15/28  HIGH       Based on the above components, the patient evaluation is determined to be of the following complexity level: LOW     Pain Rating:  Denied pain during session    Activity Tolerance:   Good and SpO2 stable on RA      After treatment patient left in no apparent distress:   Sitting in chair and Call bell within reach    COMMUNICATION/EDUCATION:   The patients plan of care was discussed with: Occupational therapist and Registered nurse. Fall prevention education was provided and the patient/caregiver indicated understanding., Patient/family have participated as able in goal setting and plan of care. , and Patient/family agree to work toward stated goals and plan of care.     Thank you for this referral.  Trevor Murphy, PT, DPT   Time Calculation: 17 mins

## 2022-10-24 NOTE — PROGRESS NOTES
OCCUPATIONAL THERAPY EVALUATION/DISCHARGE  Patient: Angela Mijares (47 y.o. female)  Date: 10/24/2022  Primary Diagnosis: Symptomatic anemia [D64.9]       Precautions:   Fall    ASSESSMENT  Based on the objective data described below, the patient presents close to ADL and functional mobility baseline for ADLs s/p admission for anemia. Pt with hx of L sided rib pain but none reported at time of evaluation. She was supervision for bed mobility, SBA for sit <> stand and adjusting underwear in standing. Pt with minor balance deficits with PT to follow. Pt SOB post activity, and reported hx of COPD. Educated pt on energy conservation techniques and encouraged to utilize shower seat at home. OT will sign off at this time. Current Level of Function (ADLs/self-care): SBA to mod I, balance deficits at baseline    Functional Outcome Measure: The patient scored 80 on the barthel outcome measure. Other factors to consider for discharge: son currently lives with patient     PLAN :  Recommend with staff: OOB to chair meals, bathroom mobility    Recommendation for discharge: (in order for the patient to meet his/her long term goals)  No skilled occupational therapy/ follow up rehabilitation needs identified at this time. This discharge recommendation:  Has not yet been discussed the attending provider and/or case management    IF patient discharges home will need the following DME: none- pt has all needed DME       SUBJECTIVE:   Patient stated I have some balance issues anyways. I wobble.     OBJECTIVE DATA SUMMARY:   HISTORY:   Past Medical History:   Diagnosis Date    Asthma     CAD (coronary artery disease)     CHF (congestive heart failure) ([facility] [address]     Chronic kidney disease      [phone]    COPD (chronic obstructive pulmonary disease) ([facility])     Diabetes ([facility])     DM2    GERD (gastroesophageal reflux disease)     Hypertension      Past Surgical History:   Procedure Laterality Date    DELIVERY       HX CORONARY STENT PLACEMENT      HX HYSTERECTOMY      IR INSERT NON TUNL CVC OVER 5 YRS  12/3/2021    IR INSERT TUNL CVC W/O PORT OVER 5 YR  2021    [facility] CARDIAC SURG PROCEDURE UNLIST      VASCULAR SURGERY PROCEDURE UNLIST      leg stent       Prior Level of Function/Environment/Context: pt lives [place] home with her son, uses SPC in the house, Rollator in community. Pt denied recent falls, mod I for ADLs and IADLs. Limited standing tolerance ~5 minutes at home  Expanded or extensive additional review of patient history:   Home Situation  Home Environment: Private residence  # Steps to Enter: 3  Rails to Enter: Yes  Hand Rails : Bilateral  One/Two Story Residence: One story  Living Alone: No  Support Systems: Child(mansoor)  Patient Expects to be Discharged to[de-identified] Home  Current DME Used/Available at Home: Debbe Helms, straight, Walker, rollator, Nebulizer, Shower chair  Tub or Shower Type: Tub/Shower combination    Hand dominance: Right    EXAMINATION OF PERFORMANCE DEFICITS:  Cognitive/Behavioral Status:           Perception: Appears intact  Perseveration: No perseveration noted  Safety/Judgement: Awareness of environment    Skin: intact    Edema: none noted    Hearing: Auditory  Auditory Impairment: Hard of hearing, bilateral    Vision/Perceptual:                           Acuity: Within Defined Limits    Corrective Lenses: Glasses    Range of Motion:    AROM: Within functional limits                         Strength:    Strength: Within functional limits                Coordination: Tone & Sensation:    Tone: Normal  Sensation: Impaired (numbness/tingling hands and feet)                      Balance:  Sitting: Intact  Standing: Impaired; Without support  Standing - Static: Good  Standing - Dynamic : Fair    Functional Mobility and Transfers for ADLs:  Bed Mobility:  Supine to Sit: Supervision  Scooting: Supervision    Transfers:  Sit to Stand: Stand-by assistance  Stand to Sit: Stand-by assistance  Toilet Transfer : Stand-by assistance    ADL Assessment:  Feeding: Independent    Oral Facial Hygiene/Grooming: Supervision    Bathing: Supervision         Upper Body Dressing: Modified independent    Lower Body Dressing: Supervision    Toileting: Supervision                ADL Intervention and task modifications:          Patient instructed and indicated understanding the benefits of maintaining activity tolerance, functional mobility, and independence with self care tasks during acute stay  to ensure safe return home and to baseline. Encouraged patient to increase frequency and duration OOB, be out of bed for all meals, perform daily ADLs (as approved by RN/MD regarding bathing etc), and performing functional mobility to/from bathroom. Cognitive Retraining  Safety/Judgement: Awareness of environment      Functional Measure:    Barthel Index:  Bathin  Bladder: 10  Bowels: 10  Groomin  Dressing: 10  Feeding: 10  Mobility: 10  Stairs: 0  Toilet Use: 10  Transfer (Bed to Chair and Back): 10  Total: 80/100      The Barthel ADL Index: Guidelines  1. The index should be used as a record of what a patient does, not as a record of what a patient could do. 2. The main aim is to establish degree of independence from any help, physical or verbal, however minor and for whatever reason. 3. The need for supervision renders the patient not independent. 4. A patient's performance should be established using the best available evidence. Asking the patient, friends/relatives and nurses are the usual sources, but direct observation and common sense are also important. However direct testing is not needed. 5. Usually the patient's performance over the preceding 24-48 hours is important, but occasionally longer periods will be relevant. 6. Middle categories imply that the patient supplies over 50 per cent of the effort. 7. Use of aids to be independent is allowed.     Score Interpretation (from [address] [facility] 83)    Independent   60-79 Minimally independent   40-59 Partially dependent   20-39 Very dependent   <20 Totally dependent     -Beatriz Escobar., Barthel, D.W. (1965). Functional evaluation: the Barthel Index. [facility] ([address] [place] (1997). The Barthel activities of daily living index: self-reporting versus actual performance in the old (> or = 75 years). Journal [address] 45(7), [facility], J.J.MARILUF, Misael Lantigua., Gwen Pena. (1999). Measuring the change in disability after inpatient rehabilitation; comparison of the responsiveness of the Barthel Index and Functional [place] Measure. Journal of Neurology, Neurosurgery, and Psychiatry, 66(4), 255-814. SARAI Us, BAIRON Oviedo, & Kylie Mclain, MJOHN. (2004) Assessment of post-stroke quality of life in cost-effectiveness studies: The usefulness of the Barthel Index and the EuroQoL-5D. Quality of Life Research, 15, 314-48         Occupational Therapy Evaluation Charge Determination   History Examination Decision-Making   LOW Complexity : Brief history review  LOW Complexity : 1-3 performance deficits relating to physical, cognitive , or psychosocial skils that result in activity limitations and / or participation restrictions  MEDIUM Complexity : Patient may present with comorbidities that affect occupational performnce.  Miniml to moderate modification of tasks or assistance (eg, physical or verbal ) with assesment(s) is necessary to enable patient to complete evaluation       Based on the above components, the patient evaluation is determined to be of the following complexity level: LOW   Pain Rating:  No pain along L rib at evaluation    Activity Tolerance:   Fair, requires rest breaks, and observed SOB with activity    After treatment patient left in no apparent distress:    Sitting in chair and Call bell within reach    COMMUNICATION/EDUCATION:   The patients plan of care was discussed with: Physical therapist and Registered nurse.      Thank you for this referral.  Daniella Claros OT  Time Calculation: 17 mins

## 2022-10-25 VITALS
TEMPERATURE: 98.6 F | OXYGEN SATURATION: 96 % | RESPIRATION RATE: 13 BRPM | SYSTOLIC BLOOD PRESSURE: 122 MMHG | HEART RATE: 84 BPM | BODY MASS INDEX: 37 KG/M2 | HEIGHT: 64 IN | DIASTOLIC BLOOD PRESSURE: 60 MMHG | WEIGHT: 216.71 LBS

## 2022-10-25 LAB
ANION GAP SERPL CALC-SCNC: 9 MMOL/L (ref 5–15)
BASOPHILS # BLD: 0.1 K/UL (ref 0–0.1)
BASOPHILS NFR BLD: 1 % (ref 0–1)
BUN SERPL-MCNC: 38 MG/DL (ref 6–20)
BUN/CREAT SERPL: 6 (ref 12–20)
CALCIUM SERPL-MCNC: 8 MG/DL (ref 8.5–10.1)
CHLORIDE SERPL-SCNC: 103 MMOL/L (ref 97–108)
CO2 SERPL-SCNC: 26 MMOL/L (ref 21–32)
CREAT SERPL-MCNC: 6.43 MG/DL (ref 0.55–1.02)
DIFFERENTIAL METHOD BLD: ABNORMAL
EOSINOPHIL # BLD: 0.3 K/UL (ref 0–0.4)
EOSINOPHIL NFR BLD: 4 % (ref 0–7)
ERYTHROCYTE [DISTWIDTH] IN BLOOD BY AUTOMATED COUNT: 16.4 % (ref 11.5–14.5)
GLUCOSE BLD STRIP.AUTO-MCNC: 122 MG/DL (ref 65–117)
GLUCOSE BLD STRIP.AUTO-MCNC: 156 MG/DL (ref 65–117)
GLUCOSE SERPL-MCNC: 136 MG/DL (ref 65–100)
HCT VFR BLD AUTO: 25.7 % (ref 35–47)
HGB BLD-MCNC: 7.9 G/DL (ref 11.5–16)
IMM GRANULOCYTES # BLD AUTO: 0 K/UL (ref 0–0.04)
IMM GRANULOCYTES NFR BLD AUTO: 0 % (ref 0–0.5)
LYMPHOCYTES # BLD: 2.3 K/UL (ref 0.8–3.5)
LYMPHOCYTES NFR BLD: 31 % (ref 12–49)
MCH RBC QN AUTO: 30.4 PG (ref 26–34)
MCHC RBC AUTO-ENTMCNC: 30.7 G/DL (ref 30–36.5)
MCV RBC AUTO: 98.8 FL (ref 80–99)
MONOCYTES # BLD: 0.7 K/UL (ref 0–1)
MONOCYTES NFR BLD: 9 % (ref 5–13)
NEUTS SEG # BLD: 4.2 K/UL (ref 1.8–8)
NEUTS SEG NFR BLD: 55 % (ref 32–75)
NRBC # BLD: 0 K/UL (ref 0–0.01)
NRBC BLD-RTO: 0 PER 100 WBC
PLATELET # BLD AUTO: 212 K/UL (ref 150–400)
PMV BLD AUTO: 9.5 FL (ref 8.9–12.9)
POTASSIUM SERPL-SCNC: 3.8 MMOL/L (ref 3.5–5.1)
RBC # BLD AUTO: 2.6 M/UL (ref 3.8–5.2)
SERVICE CMNT-IMP: ABNORMAL
SERVICE CMNT-IMP: ABNORMAL
SODIUM SERPL-SCNC: 138 MMOL/L (ref 136–145)
WBC # BLD AUTO: 7.6 K/UL (ref 3.6–11)

## 2022-10-25 PROCEDURE — 36415 COLL VENOUS BLD VENIPUNCTURE: CPT

## 2022-10-25 PROCEDURE — 85025 COMPLETE CBC W/AUTO DIFF WBC: CPT

## 2022-10-25 PROCEDURE — 74011000250 HC RX REV CODE- 250: Performed by: FAMILY MEDICINE

## 2022-10-25 PROCEDURE — 82962 GLUCOSE BLOOD TEST: CPT

## 2022-10-25 PROCEDURE — 74011250637 HC RX REV CODE- 250/637: Performed by: FAMILY MEDICINE

## 2022-10-25 PROCEDURE — 80048 BASIC METABOLIC PNL TOTAL CA: CPT

## 2022-10-25 PROCEDURE — 74011636637 HC RX REV CODE- 636/637: Performed by: HOSPITALIST

## 2022-10-25 PROCEDURE — 74011636637 HC RX REV CODE- 636/637: Performed by: FAMILY MEDICINE

## 2022-10-25 PROCEDURE — 97116 GAIT TRAINING THERAPY: CPT

## 2022-10-25 RX ORDER — OXYCODONE AND ACETAMINOPHEN 5; 325 MG/1; MG/1
1 TABLET ORAL
Qty: 12 TABLET | Refills: 0 | Status: SHIPPED | OUTPATIENT
Start: 2022-10-25 | End: 2022-10-28

## 2022-10-25 RX ADMIN — Medication 2 UNITS: at 12:55

## 2022-10-25 RX ADMIN — Medication 22 UNITS: at 09:25

## 2022-10-25 RX ADMIN — SODIUM CHLORIDE, PRESERVATIVE FREE 10 ML: 5 INJECTION INTRAVENOUS at 07:00

## 2022-10-25 RX ADMIN — CARVEDILOL 25 MG: 12.5 TABLET, FILM COATED ORAL at 09:25

## 2022-10-25 RX ADMIN — OXYCODONE AND ACETAMINOPHEN 1 TABLET: 5; 325 TABLET ORAL at 14:03

## 2022-10-25 RX ADMIN — ASPIRIN 325 MG ORAL TABLET 325 MG: 325 PILL ORAL at 09:25

## 2022-10-25 RX ADMIN — OXYCODONE AND ACETAMINOPHEN 1 TABLET: 5; 325 TABLET ORAL at 07:00

## 2022-10-25 RX ADMIN — HYDRALAZINE HYDROCHLORIDE 50 MG: 50 TABLET, FILM COATED ORAL at 09:25

## 2022-10-25 RX ADMIN — IPRATROPIUM BROMIDE AND ALBUTEROL SULFATE 3 ML: .5; 3 SOLUTION RESPIRATORY (INHALATION) at 05:00

## 2022-10-25 RX ADMIN — SODIUM CHLORIDE, PRESERVATIVE FREE 10 ML: 5 INJECTION INTRAVENOUS at 05:02

## 2022-10-25 NOTE — PROGRESS NOTES
Nephrology Progress Note  Samanta Folres  Date of Admission : 10/19/2022    CC:  Follow up for ESRD       Assessment and Plan     ESRD on PD:  - CCPD pt at [address] with current Rx, all 2.5% solutions     Hypokalemia:  - resolved     Anemia of chronic disease:  - PRBCs 10/29  - cont MADY MWF    Spinal stenosis:  - pain improving    Parotid masses:  - follows with ENT, had bx previously  - can f/up as an outpt    COPD  DM2  CAD       Interval History:  Seen and examined. No issues with PD overnight. K and hgb better. MRI findings noted. No cp or sob. Pain better today. No plans for surgery. Plans for d/c today    Current Medications: all current  Medications have been eviewed in EPIC  Review of Systems: Pertinent items are noted in HPI. Objective:  Vitals:    Vitals:    10/25/22 0600 10/25/22 0703 10/25/22 0925 10/25/22 1000   BP:  128/86 137/67    Pulse: 74 96 89 (!) 103   Resp:  9     Temp:  99 °F (37.2 °C)     SpO2:  98%     Weight:       Height:         Intake and Output:  No intake/output data recorded. 10/23 1901 - 10/25 0700  In: -   Out: 6112     Physical Examination:  General: NAD,Conversant   Neck:  Supple, no mass  Resp:  Lungs CTA B/L, no wheezing , normal respiratory effort  CV:  RRR,  no murmur or rub,trace LE edema  GI:  Soft, NT, + Bowel sounds, PD exit site clean  Neurologic:  Non focal  Psych:             AAO x 3 appropriate affect   Skin:  No Rash    []    High complexity decision making was performed  []    Patient is at high-risk of decompensation with multiple organ involvement    Lab Data Personally Reviewed: I have reviewed all the pertinent labs, microbiology data and radiology studies during assessment.     Recent Labs     10/25/22  0446 10/24/22  0348    139   K 3.8 4.0    104   CO2 26 25   * 172*   BUN 38* 35*   CREA 6.43* 6.23*   CA 8.0* 7.9*   PHOS  --  5.5*   ALB  --  2.7*       Recent Labs     10/25/22  0446 10/24/22  0340   WBC 7.6 6.9   HGB 7. 9* 8.1*   HCT 25.7* 26.7*    215       No results found for: SDES  Lab Results   Component Value Date/Time    Culture result: MIXED UROGENITAL STANLEY ISOLATED 05/17/2022 09:52 AM    Culture result: NO GROWTH 5 DAYS 05/15/2022 03:51 AM    Culture result: NO GROWTH 5 DAYS 03/15/2018 06:09 PM     Recent Results (from the past 24 hour(s))   GLUCOSE, POC    Collection Time: 10/24/22 11:53 AM   Result Value Ref Range    Glucose (POC) 213 (H) 65 - 117 mg/dL    Performed by Chela [facility] [place], POC    Collection Time: 10/24/22  4:52 PM   Result Value Ref Range    Glucose (POC) 145 (H) 65 - 117 mg/dL    Performed by Anastasia Andrews    GLUCOSE, POC    Collection Time: 10/24/22 10:28 PM   Result Value Ref Range    Glucose (POC) 220 (H) 65 - 117 mg/dL    Performed by Rina Sanchez    CBC WITH AUTOMATED DIFF    Collection Time: 10/25/22  4:46 AM   Result Value Ref Range    WBC 7.6 3.6 - 11.0 K/uL    RBC 2.60 (L) 3.80 - 5.20 M/uL    HGB 7.9 (L) 11.5 - 16.0 g/dL    HCT 25.7 (L) 35.0 - 47.0 %    MCV 98.8 80.0 - 99.0 FL    MCH 30.4 26.0 - 34.0 PG    MCHC 30.7 30.0 - 36.5 g/dL    RDW 16.4 (H) 11.5 - 14.5 %    PLATELET 321 205 - 590 K/uL    MPV 9.5 8.9 - 12.9 FL    NRBC 0.0 0  WBC    ABSOLUTE NRBC 0.00 0.00 - 0.01 K/uL    NEUTROPHILS 55 32 - 75 %    LYMPHOCYTES 31 12 - 49 %    MONOCYTES 9 5 - 13 %    EOSINOPHILS 4 0 - 7 %    BASOPHILS 1 0 - 1 %    IMMATURE GRANULOCYTES 0 0.0 - 0.5 %    ABS. NEUTROPHILS 4.2 1.8 - 8.0 K/UL    ABS. LYMPHOCYTES 2.3 0.8 - 3.5 K/UL    ABS. MONOCYTES 0.7 0.0 - 1.0 K/UL    ABS. EOSINOPHILS 0.3 0.0 - 0.4 K/UL    ABS. BASOPHILS 0.1 0.0 - 0.1 K/UL    ABS. IMM.  GRANS. 0.0 0.00 - 0.04 K/UL    DF AUTOMATED     METABOLIC PANEL, BASIC    Collection Time: 10/25/22  4:46 AM   Result Value Ref Range    Sodium 138 136 - 145 mmol/L    Potassium 3.8 3.5 - 5.1 mmol/L    Chloride 103 97 - 108 mmol/L    CO2 26 21 - 32 mmol/L    Anion gap 9 5 - 15 mmol/L    Glucose 136 (H) 65 - 100 mg/dL    BUN 38 (H) 6 - 20 MG/DL    Creatinine 6.43 (H) 0.55 - 1.02 MG/DL    BUN/Creatinine ratio 6 (L) 12 - 20      eGFR 7 (L) >60 ml/min/1.73m2    Calcium 8.0 (L) 8.5 - 10.1 MG/DL   GLUCOSE, POC    Collection Time: 10/25/22  8:20 AM   Result Value Ref Range    Glucose (POC) 122 (H) 65 - 117 mg/dL    Performed by Rg Rubin MD  [address]  Phone - [phone]   Fax - [phone]  [email or web address]

## 2022-10-25 NOTE — DIALYSIS
Peritoneal Dialysis Disconnection / [phone]         Metrics   I-Drain: 1 mL   Total UF: 879 mL   Last Fill: 0 mL   Last Manual Drain: 0 mL   Net UF:         880 mL   Avg Dwell Time: 1:15   Lost Dwell Time: 0:18   Alarms: No alarms    Effluent: Clear, yellow          Access   Type & Location: Left abd PD catheter   Comments: Prior to disconnection, one-minute Alcavis scrub performed. Sterile mini cap applied and secured to abdomen. Dsg clean, dry and intact. Dsg date: 10/24/22          Safety:   Primary Nurse Rpt Pre: MARIAN Barnes      Comments:   Line secured to abdomen. Old cassette/bags discarded in red biohazard.  Education pre/post. Bed in lowest position, call bell and personal belongings

## 2022-10-25 NOTE — PROGRESS NOTES
Spiritual Care Partner Volunteer visited patient at [facility] in [facility] on 10/25/2022     Documented by:    Rev. Dallas Smith MDiv, MS, [facility]  Staff

## 2022-10-25 NOTE — DISCHARGE SUMMARY
Discharge Summary       PATIENT ID: Kamala Underwood  MRN: [id number]   YOB: 1955    DATE OF ADMISSION: 10/19/2022  8:11 PM    DATE OF DISCHARGE: 10/25/22   PRIMARY CARE PROVIDER: Katiana Fraser MD     ATTENDING PHYSICIAN: Tyler Hobbs  DISCHARGING PROVIDER: Tomasa Serrano MD    To contact this individual call [phone] and ask the  to page. If unavailable ask to be transferred the Adult Hospitalist Department. CONSULTATIONS: IP CONSULT TO NEPHROLOGY  IP CONSULT TO ORTHOPEDIC SURGERY    PROCEDURES/SURGERIES: * No surgery found *    DISCHARGE DIAGNOSES: ESRD on PD     Kamala Underwood is a 75 y.o. female with a pmhx CAD s/p PCI, HF, CKD on peritoneal dialysis, COPD, DM II, GERD, and hypertension, who presents with left sided neck pain, generalized weakness, and shortness of breath. She states that she has acute onset left side pain of the face radiation down her left arm and to her left sail. Pain improves with hydrocodone. [address] [facility] COURSE:     ESRD on PD:  - CCPD pt at [address] with current Rx, all 2.5% solutions     Symptomatic anemia of chronic disease   -Hgb 8.3  -No evidence of active bleeding   --Patient gets MADY Monday Wednesday Friday     Severe left side neck pain  -MRI of cervical spine showed mild to moderate spinal canal stenosis and severe left neural foraminal stenosis at C3-C4.     Mild spinal canal stenosis and severe left neural foraminal stenosis at C4-C5  -- In by orthopedic surgery with in-house no plan for surgery physical therapy recommended as needed pain medication     COPD  -not bronchospastic   -SpO2 93-95% on RA     Hx of CAD   -on asprin, coreg, and lipitor     T2DM  -A1c 8.4  -continue sliding scale and monitor finger stick glucose      HTN  -BP normal, continue carvedilol, hydralazine and monitor blood pressure     Dyslipidemia   -continue lipitor     Bilateral parotid masses on MRI   -Patient aware about her bilateral parotid masses and saw ENT, had a biopsy and she was told that it is benign  -Patient is advised to follow-up as an outpatient with ENT          [address] [facility] [address] / PLAN:      Discharge home    BMI: Body mass index is 37.2 kg/m². . This patient: Meets criteria for obesity given BMI >/= 30 and < 40 due to excess calories/nutritional. Weight loss and lifestyle modifications should be encouraged as an outpatient. PENDING TEST RESULTS:   At the time of discharge the following test results are still pending:      ADDITIONAL CARE RECOMMENDATIONS:        NOTIFY YOUR PHYSICIAN FOR ANY OF THE FOLLOWING:   Fever over 101 degrees for 24 hours. Chest pain, shortness of breath, fever, chills, nausea, vomiting, diarrhea, change in mentation, falling, weakness, bleeding. Severe pain or pain not relieved by medications, as well as any other signs or symptoms that you may have questions about. FOLLOW UP APPOINTMENTS:    Follow-up Information       Follow up With Specialties Details Why Contact Info    Jocy Hernandez MD Family Medicine Follow up in 1 week(s) Hosptial follow up with PCP scheduled as VIRTUAL VIDEO appointment, with Dr. Maria Dolores Durandday, November 1st, 2022 at 12 noon. Nurse will contact patient 30 minutes prior to appointment to triage and patient will receive link for appointment afterwards. [address] [place] [address]  [phone]                 DIET: Renal Diet    ACTIVITY: Activity as tolerated    EQUIPMENT needed:     DISCHARGE MEDICATIONS:  Current Discharge Medication List        START taking these medications    Details   oxyCODONE-acetaminophen (PERCOCET) 5-325 mg per tablet Take 1 Tablet by mouth every six (6) hours as needed for Pain for up to 3 days. Max Daily Amount: 12 Tablets.   Qty: 12 Tablet, Refills: 0  Start date: 10/25/2022, End date: 10/28/2022    Associated Diagnoses: Neck pain on left side           CONTINUE these medications which have NOT CHANGED    Details lactulose 10 gram/15 mL (15 mL) soln Take 30 mL by mouth two (2) times daily as needed for PRN Reason (Other) (Constipation). carvediloL (COREG) 25 mg tablet Take 25 mg by mouth two (2) times daily (with meals). dulaglutide (Trulicity) 9.67 HX/6.2 mL sub-q pen 0.75 mg by SubCUTAneous route every seven (7) days. insulin glargine (LANTUS,BASAGLAR) 100 unit/mL (3 mL) inpn 20 Units by SubCUTAneous route daily. rosuvastatin (CRESTOR) 20 mg tablet Take 0.5 Tablets by mouth nightly. Take 1 Tab by mouth nightly. Dose reduced from 20 mg to 10 mg.  Qty: 30 Tablet, Refills: 0      epoetin joni-epbx (RETACRIT) 20,000 unit/mL injection 1 mL by SubCUTAneous route every Monday, Wednesday, Friday. Indications: anemia due to kidney failure  Indications: anemia due to kidney failure  Qty: 30 Each, Refills: 0      hydrALAZINE (APRESOLINE) 50 mg tablet Take 1 Tablet by mouth three (3) times daily. Qty: 90 Tablet, Refills: 0      mometasone-formoterol (DULERA) 200-5 mcg/actuation HFA inhaler Take 2 Puffs by inhalation two (2) times a day. albuterol-ipratropium (DUO-NEB) 2.5 mg-0.5 mg/3 ml nebu 3 mL by Nebulization route every six (6) hours as needed. Qty: 30 Nebule, Refills: 0      montelukast (SINGULAIR) 10 mg tablet Take 1 Tab by mouth nightly. Qty: 30 Tab, Refills: 0      aspirin (ASPIRIN) 325 mg tablet Take 325 mg by mouth daily. insulin NPH/insulin regular (NOVOLIN 70/30 U-100 INSULIN) 100 unit/mL (70-30) injection 45 Units by SubCUTAneous route two (2) times a day.   Qty: 10 mL, Refills: 0             DISPOSITION:  X  Home With:  X OT X PT X HH  RN       Long term SNF/Inpatient Rehab    Independent/assisted living    Hospice    Other:       PATIENT CONDITION AT DISCHARGE:     Functional status    Poor     Deconditioned    X Independent      Cognition   X  Lucid     Forgetful     Dementia      Catheters/lines (plus indication)    Victor     PICC     PEG    X None      Code status   X  Full code DNR      PHYSICAL EXAMINATION AT DISCHARGE:  General:          Alert, cooperative, no distress, appears stated age. HEENT:           Atraumatic, anicteric sclerae, pink conjunctivae                          No oral ulcers, mucosa moist, throat clear, dentition fair  Neck:               Supple, symmetrical  Lungs:             Clear to auscultation bilaterally. No Wheezing or Rhonchi. No rales. Chest wall:      No tenderness  No Accessory muscle use. Heart:              Regular  rhythm,  No  murmur   No edema  Abdomen:        Soft, non-tender. Not distended. Bowel sounds normal  Extremities:     No cyanosis. No clubbing,                            Skin turgor normal, Capillary refill normal  Skin:                Not pale. Not Jaundiced  No rashes   Psych:             Not anxious or agitated.   Neurologic:      Alert, moves all extremities, answers questions appropriately and responds to commands       [address]:  Problem List as of 10/25/2022 Date Reviewed: 1/18/2022            Codes Class Noted - Resolved    Chest pain at rest ICD-10-CM: R07.9  ICD-9-CM: 786.50  2/21/2014 - Present        ASHD (arteriosclerotic heart disease) ICD-10-CM: I25.10  ICD-9-CM: 414.00  2/21/2014 - Present        Diabetes mellitus ([facility] ICD-10-CM: E11.9  ICD-9-CM: 250.00  2/21/2014 - Present        Hypertension, benign ICD-10-CM: I10  ICD-9-CM: 401.1  2/21/2014 - Present        ASO (arteriosclerosis obliterans) ICD-10-CM: I70.90  ICD-9-CM: 440.9  2/21/2014 - Present        Hypercholesteremia ICD-10-CM: E78.00  ICD-9-CM: 272.0  2/21/2014 - Present        Symptomatic anemia ICD-10-CM: D64.9  ICD-9-CM: 285.9  10/19/2022 - Present        SOB (shortness of breath) ICD-10-CM: R06.02  ICD-9-CM: 786.05  5/15/2022 - Present        Acute renal failure (ARF) (HCC) ICD-10-CM: N17.9  ICD-9-CM: 584.9  12/2/2021 - Present        NSTEMI (non-ST elevated myocardial infarction) ([facility] ICD-10-CM: I21.4  ICD-9-CM: 410.70  4/25/2019 - Present        Bilateral leg edema ICD-10-CM: R60.0  ICD-9-CM: 782.3  3/15/2018 - Present        RESOLVED: CHF, acute on chronic (HCC) ICD-10-CM: I50.9  ICD-9-CM: 428.0  7/2/2018 - 7/9/2018        RESOLVED: Fever ICD-10-CM: R50.9  ICD-9-CM: 780.60  3/15/2018 - 3/19/2018        RESOLVED: SIRS (systemic inflammatory response syndrome) ([facility] ICD-10-CM: R65.10  ICD-9-CM: 995.90  3/15/2018 - 3/19/2018        RESOLVED: Asthma exacerbation ICD-10-CM: J45.901  ICD-9-CM: 493.92  3/15/2018 - 3/19/2018           Greater than 30  minutes were spent with the patient on counseling and coordination of care    Signed:   Rosetta Melara MD  10/25/2022  10:34 AM

## 2022-10-25 NOTE — PROGRESS NOTES
Bedside and Verbal shift change report given to Dimas Nathan RN (oncoming nurse) by Alka Lake RN (offgoing nurse). Report included the following information SBAR, Kardex, MAR, Recent Results, and Cardiac Rhythm NSR .

## 2022-10-25 NOTE — PROGRESS NOTES
Spine Surgery Progress Note  Negrito Bess PA-C    Admit Date: 10/19/2022   LOS: 6 days      Daily Progress Note: 10/25/2022    HPI: Ms. Reggie Dancer is a pleasant 64yo female who is being seen for neck pain with associated left sided flank pain which began last week. She has had previous similar episodes in the past which have resolved on their own. She was admitted 10/19/22 for COPD exacerbation and anemia. She has chronic neuropathy in her hands and feet which seems to be at baseline today. An MRI of the cervical spine was done 10/21/22 and shows extensive degenerative changes and severe left-sided foraminal stenosis at C3-4 and C4-5. Spine surgery has been consulted to evaluate the above. PMH significant for CAD s/p PCI, HF, CKD on peritoneal dialysis, COPD, DM II, GERD, and hypertension. Subjective:     Pt is resting comfortably in bed today. She denies neck pain. She continues to have some left-sided flank pain but it is improved. Pt denies numbness and tingling at present. She denies weakness. She states her  strength is normal and she is ambulating without issue. Denies changes to bowel and bladder function. Objective:     Vital signs  VSS Afebrile. Temp (24hrs), Av.5 °F (36.9 °C), Min:97.7 °F (36.5 °C), Max:99 °F (37.2 °C)   No intake/output data recorded.   10/23 1901 - 10/25 0700  In: -   Out: 1633     Visit Vitals  /60 (BP 1 Location: Left upper arm, BP Patient Position: Sitting)   Pulse 84   Temp 98.6 °F (37 °C)   Resp 13   Ht 5' 4\" (1.626 m)   Wt 98.3 kg (216 lb 11.4 oz)   SpO2 96%   Breastfeeding No   BMI 37.20 kg/m²    O2 Flow Rate (L/min): 2 l/min O2 Device: None (Room air)     Output (mL)  Last Bowel Movement Date: 10/20/22 (10/24/22 0900)  Unmeasurable Output  Urine Occurrence(s): 1 (10/22/22 1130)     Pain control  Pain Assessment  Pain Scale 1: Numeric (0 - 10)  Pain Intensity 1: 0  Pain Onset 1: acute  Pain Location 1: Flank  Pain Orientation 1: Left  Pain Description 1: Aching  Pain Intervention(s) 1: Medication (see MAR)    PT/OT  Gait     Gait  Base of Support: Widened  Speed/Maryann: Pace decreased (<100 feet/min)  Step Length: Right shortened, Left shortened  Gait Abnormalities: Path deviations, Decreased step clearance  Ambulation - Level of Assistance: Modified independent, Supervision  Distance (ft): 100 Feet (ft)  Assistive Device: Gait belt, Cane, straight        Physical Exam:  Gen: No acute distress. Neuro: A&Ox3. Follows commands. Speech clear. Affect normal.  WEBBER. Strength 5/5 in UE and LE BL. Sensation intact. Gait deferred. 24 hour results:    Recent Results (from the past 24 hour(s))   GLUCOSE, POC    Collection Time: 10/24/22 10:28 PM   Result Value Ref Range    Glucose (POC) 220 (H) 65 - 117 mg/dL    Performed by Eli Corcoran    CBC WITH AUTOMATED DIFF    Collection Time: 10/25/22  4:46 AM   Result Value Ref Range    WBC 7.6 3.6 - 11.0 K/uL    RBC 2.60 (L) 3.80 - 5.20 M/uL    HGB 7.9 (L) 11.5 - 16.0 g/dL    HCT 25.7 (L) 35.0 - 47.0 %    MCV 98.8 80.0 - 99.0 FL    MCH 30.4 26.0 - 34.0 PG    MCHC 30.7 30.0 - 36.5 g/dL    RDW 16.4 (H) 11.5 - 14.5 %    PLATELET 234 476 - 475 K/uL    MPV 9.5 8.9 - 12.9 FL    NRBC 0.0 0  WBC    ABSOLUTE NRBC 0.00 0.00 - 0.01 K/uL    NEUTROPHILS 55 32 - 75 %    LYMPHOCYTES 31 12 - 49 %    MONOCYTES 9 5 - 13 %    EOSINOPHILS 4 0 - 7 %    BASOPHILS 1 0 - 1 %    IMMATURE GRANULOCYTES 0 0.0 - 0.5 %    ABS. NEUTROPHILS 4.2 1.8 - 8.0 K/UL    ABS. LYMPHOCYTES 2.3 0.8 - 3.5 K/UL    ABS. MONOCYTES 0.7 0.0 - 1.0 K/UL    ABS. EOSINOPHILS 0.3 0.0 - 0.4 K/UL    ABS. BASOPHILS 0.1 0.0 - 0.1 K/UL    ABS. IMM.  GRANS. 0.0 0.00 - 0.04 K/UL    DF AUTOMATED     METABOLIC PANEL, BASIC    Collection Time: 10/25/22  4:46 AM   Result Value Ref Range    Sodium 138 136 - 145 mmol/L    Potassium 3.8 3.5 - 5.1 mmol/L    Chloride 103 97 - 108 mmol/L    CO2 26 21 - 32 mmol/L    Anion gap 9 5 - 15 mmol/L    Glucose 136 (H) 65 - 100 mg/dL    BUN 38 (H) 6 - 20 MG/DL    Creatinine 6.43 (H) 0.55 - 1.02 MG/DL    BUN/Creatinine ratio 6 (L) 12 - 20      eGFR 7 (L) >60 ml/min/1.73m2    Calcium 8.0 (L) 8.5 - 10.1 MG/DL   GLUCOSE, POC    Collection Time: 10/25/22  8:20 AM   Result Value Ref Range    Glucose (POC) 122 (H) 65 - 117 mg/dL    Performed by Chela [facility] [place], POC    Collection Time: 10/25/22 11:57 AM   Result Value Ref Range    Glucose (POC) 156 (H) 65 - 117 mg/dL    Performed by Jyotsna RACHEL         Assessment:     Active Problems:    Symptomatic anemia (10/19/2022)      Plan:     Pt seen and examined with Dr. Ping Sterling. No indication for surgical intervention at this time. Recommend medical optimization and outpatient follow up for pain management. Dr. Mathieu Jimenez contact info is on the chart.       JAZ Tanner

## 2022-10-25 NOTE — PROGRESS NOTES
Problem: Mobility Impaired (Adult and Pediatric)  Goal: *Acute Goals and Plan of Care (Insert Text)  Description: FUNCTIONAL STATUS PRIOR TO ADMISSION: Patient was modified independent using a rollator and single point cane for functional mobility. Uses SPC within the home, rollator out in the community, and admits to furniture walking when without cane within the home. Denies falls. She manages her own PD at night. HOME SUPPORT PRIOR TO ADMISSION: The patient lived with her son. Physical Therapy Goals  Initiated 10/24/2022  1. Patient will move from supine to sit and sit to supine , scoot up and down, and roll side to side in bed with independence within 7 day(s). 2.  Patient will transfer from bed to chair and chair to bed with modified independence using the least restrictive device within 7 day(s). 3.  Patient will perform sit to stand with modified independence within 7 day(s). 4.  Patient will ambulate with modified independence for 150 feet with the least restrictive device within 7 day(s). 5.  Patient will ascend/descend 3 stairs with right handrail(s) with modified independence within 7 day(s). Outcome: Progressing Towards Goal   PHYSICAL THERAPY TREATMENT  Patient: Sommer Rubio (81 y.o. female)  Date: 10/25/2022  Diagnosis: Symptomatic anemia [D64.9] <principal problem not specified>      Precautions: Fall  Chart, physical therapy assessment, plan of care and goals were reviewed. ASSESSMENT  Patient continues with skilled PT services and is progressing towards goals. Noted plan for DC today. Received pt in bed. She is independent with bed mobility and sit<->stand. Gait trained with SPC today. Ambulated 100 ft with supervision improving to modified independent with cane support. Pt owns a cane and rollator walker and states her son will stay with her when she returns home. Patient is appropriate to DC home, no additional PT needs. Pt remained sitting in the chair.   Recommended she call for RN assist when ready to return to bed d/t fall risk with tele lines. Current Level of Function Impacting Discharge (mobility/balance): Independent. Other factors to consider for discharge: Lives alone, son planning to stay w/ her when she discharges         PLAN :  Patient continues to benefit from skilled intervention to address the above impairments. Continue treatment per established plan of care. to address goals. Recommendation for discharge: (in order for the patient to meet his/her long term goals)  No skilled physical therapy/ follow up rehabilitation needs identified at this time. This discharge recommendation:  A follow-up discussion with the attending provider and/or case management is planned    IF patient discharges home will need the following DME: patient owns DME required for discharge       SUBJECTIVE:   Patient stated I've been getting myself around.     OBJECTIVE DATA SUMMARY:   Critical Behavior:  Neurologic State: Alert  Orientation Level: Oriented X4  Cognition: Appropriate decision making, Appropriate for age attention/concentration, Appropriate safety awareness, Follows commands  Safety/Judgement: Awareness of environment  Functional Mobility Training:  Bed Mobility:     Supine to Sit: Independent     Scooting: Independent        Transfers:  Sit to Stand: Modified independent  Stand to Sit: Modified independent                             Balance:  Sitting: Intact; Without support  Standing: Impaired; Without support  Standing - Static: Good  Standing - Dynamic : Good;Constant support  Ambulation/Gait Training:  Distance (ft): 100 Feet (ft)  Assistive Device: Gait belt;Cane, straight  Ambulation - Level of Assistance: Modified independent;Supervision                 Base of Support: Widened                              Pain Rating:      Activity Tolerance:   Good    After treatment patient left in no apparent distress:   Sitting in chair and Call bell within reach and RN aware. COMMUNICATION/COLLABORATION:   The patients plan of care was discussed with: Registered nurse.      Uriel Enciso, PT   Time Calculation: 22 mins

## 2022-10-25 NOTE — PROGRESS NOTES
Transition Plan of Care  RUR 17%-Med  Disposition-discharging home today with no skilled needs. Therapy has signed cleared for discharge with no needs. Patient is MWF dialysis at [facility] and will return to that location for dialysis tomorrow. Family can transport. Medicare pt has received, reviewed, and signed 2nd IM letter informing them of their right to appeal the discharge. Signed copy has been placed on pt bedside chart.

## 2022-10-26 ENCOUNTER — TELEPHONE (OUTPATIENT)
Dept: CASE MANAGEMENT | Age: 67
End: 2022-10-26

## 2022-10-26 NOTE — CONSULTS
ORTHO SPINE ATTENDING CONSULT NOTE     Subjective:      Date of Consultation:  2022  Referring Physician:  Jessie Thompson Pt seen and examined at bedside personally. Chaparro Vivar is a 75 y.o. female who is being seen for neck pain, periscapular with associated left sided flank pain which began last week. She has had previous similar episodes in the past which have resolved on their own. She does not have an established spine surgeon. She has been treated with oxycodone here in the hospital and that has helped. She was admitted 10/19/22 for COPD exacerbation and anemia. Her neck pain is much better than she did last week but still reports left flank pain. That is her primary complaint today. She has chronic neuropathy in her hands and feet which seems to be at baseline today. An MRI of the cervical spine was done 10/21/22 and shows extensive degenerative changes and severe left-sided foraminal stenosis at C3-4 and C4-5. Spine surgery has been consulted to evaluate the above. PMH significant for CAD s/p PCI, HF, CKD on peritoneal dialysis, COPD, DM II, GERD, and hypertension.           Patient Active Problem List     Diagnosis Date Noted    Chest pain at rest 2014    ASHD (arteriosclerotic heart disease) 2014    Diabetes mellitus ([address] 2014    Hypertension, benign 2014    ASO (arteriosclerosis obliterans) 2014    Hypercholesteremia 2014    Symptomatic anemia 10/19/2022    SOB (shortness of breath) 05/15/2022    Acute renal failure (ARF) ([address] 2021    NSTEMI (non-ST elevated myocardial infarction) [facility] 2019    Bilateral leg edema 03/15/2018            Family History   Problem Relation Age of Onset    Heart Disease Mother      Cancer Father        Social History            Tobacco Use    Smoking status: Former       Packs/day: 0.25       Years: 10.00       Pack years: 2.50       Types: Cigarettes       Quit date: 2021       Years since quittin.9 Smokeless tobacco: Never   Substance Use Topics    Alcohol use: No           Past Medical History:   Diagnosis Date    Asthma      CAD (coronary artery disease)      CHF (congestive heart failure) ([facility])      Chronic kidney disease       HD M-W- Radu Menon [phone]    COPD (chronic obstructive pulmonary disease) ([facility])      Diabetes ([facility])       DM2    GERD (gastroesophageal reflux disease)      Hypertension              Past Surgical History:   Procedure Laterality Date    DELIVERY         HX CORONARY STENT PLACEMENT        HX HYSTERECTOMY        IR INSERT NON TUNL CVC OVER 5 YRS   12/3/2021    IR INSERT TUNL CVC W/O PORT OVER 5 YR   2021    SD CARDIAC SURG PROCEDURE UNLIST        VASCULAR SURGERY PROCEDURE UNLIST         leg stent              Prior to Admission medications    Medication Sig Start Date End Date Taking? Authorizing Provider   lactulose 10 gram/15 mL (15 mL) soln Take 30 mL by mouth two (2) times daily as needed for PRN Reason (Other) (Constipation). 22   Yes Provider, Historical   carvediloL (COREG) 25 mg tablet Take 25 mg by mouth two (2) times daily (with meals). Yes Provider, Historical   dulaglutide (Trulicity) 8.57 AK/5.1 mL sub-q pen 0.75 mg by SubCUTAneous route every seven (7) days. Yes Provider, Historical   insulin glargine (LANTUS,BASAGLAR) 100 unit/mL (3 mL) inpn 20 Units by SubCUTAneous route daily. Yes Provider, Historical   rosuvastatin (CRESTOR) 20 mg tablet Take 0.5 Tablets by mouth nightly. Take 1 Tab by mouth nightly. Dose reduced from 20 mg to 10 mg. 12/10/21   Yes El Mccauley MD   epoetin joni-epbx (RETACRIT) 20,000 unit/mL injection 1 mL by SubCUTAneous route every Monday, Wednesday, Friday. Indications: anemia due to kidney failure  Indications: anemia due to kidney failure 12/10/21   Yes El Mccauley MD   hydrALAZINE (APRESOLINE) 50 mg tablet Take 1 Tablet by mouth three (3) times daily.  12/10/21   Yes El Mccauley MD mometasone-formoterol (DULERA) 200-5 mcg/actuation HFA inhaler Take 2 Puffs by inhalation two (2) times a day. Yes Provider, Historical   albuterol-ipratropium (DUO-NEB) 2.5 mg-0.5 mg/3 ml nebu 3 mL by Nebulization route every six (6) hours as needed. Patient taking differently: 3 mL by Nebulization route every six (6) hours as needed for Shortness of Breath. 3/19/18   Yes Nell Dancer, MD   montelukast (SINGULAIR) 10 mg tablet Take 1 Tab by mouth nightly. 3/19/18   Yes Nell Dancer, MD   aspirin (ASPIRIN) 325 mg tablet Take 325 mg by mouth daily. Yes Other, MD Tony   insulin NPH/insulin regular (NOVOLIN 70/30 U-100 INSULIN) 100 unit/mL (70-30) injection 45 Units by SubCUTAneous route two (2) times a day.   Patient not taking: Reported on 1/18/2022 7/9/18     Nell Dancer, MD             Current Facility-Administered Medications   Medication Dose Route Frequency    cyclobenzaprine (FLEXERIL) tablet 5 mg  5 mg Oral TID PRN    insulin NPH (NOVOLIN N, HUMULIN N) injection 22 Units  22 Units SubCUTAneous ACB&D    gentamicin (GARAMYCIN) 0.1 % cream   Topical DIALYSIS PRN    hydrOXYzine HCL (ATARAX) tablet 25 mg  25 mg Oral Q6H PRN    diphenhydrAMINE (BENADRYL) capsule 25 mg  25 mg Oral Q6H PRN    sodium chloride (NS) flush 5-40 mL  5-40 mL IntraVENous Q8H    sodium chloride (NS) flush 5-40 mL  5-40 mL IntraVENous PRN    acetaminophen (TYLENOL) tablet 650 mg  650 mg Oral Q6H PRN     Or    acetaminophen (TYLENOL) suppository 650 mg  650 mg Rectal Q6H PRN    albuterol-ipratropium (DUO-NEB) 2.5 MG-0.5 MG/3 ML  3 mL Nebulization Q4H PRN    carvediloL (COREG) tablet 25 mg  25 mg Oral BID WITH MEALS    aspirin tablet 325 mg  325 mg Oral DAILY    rosuvastatin (CRESTOR) tablet 10 mg  10 mg Oral QHS    montelukast (SINGULAIR) tablet 10 mg  10 mg Oral QHS    hydrALAZINE (APRESOLINE) tablet 50 mg  50 mg Oral TID    peritoneal dialysis DEXTROSE 2.5% (2.5 mEq/L low calcium) solution 6,000 mL  6,000 mL IntraPERitoneal QPM    peritoneal dialysis DEXTROSE 2.5% (2.5 mEq/L low calcium) solution 6,000 mL  6,000 mL IntraPERitoneal QPM    epoetin joni-epbx (RETACRIT) injection 15,000 Units  15,000 Units SubCUTAneous Q MON, WED & FRI    oxyCODONE-acetaminophen (PERCOCET) 5-325 mg per tablet 1 Tablet  1 Tablet Oral Q6H PRN    gentamicin (GARAMYCIN) 0.1 % ointment   Topical DAILY    glucose chewable tablet 16 g  4 Tablet Oral PRN    glucagon (GLUCAGEN) injection 1 mg  1 mg IntraMUSCular PRN    dextrose 10 % infusion 0-250 mL  0-250 mL IntraVENous PRN    insulin lispro (HUMALOG) injection   SubCUTAneous AC&HS    0.9% sodium chloride infusion 250 mL  250 mL IntraVENous PRN           Allergies   Allergen Reactions    Ivp Dye [Fd And C Blue No.1] Hives         Review of Systems:  A comprehensive review of systems was negative except for that written in the HPI. Mental Status: no dementia     Objective:      Patient Vitals for the past 8 hrs:    BP Temp Pulse Resp SpO2   10/24/22 1525 (!) 140/69 -- 91 -- 96 %   10/24/22 1400 -- -- 92 -- --   10/24/22 1200 -- -- 80 -- --   10/24/22 1120 (!) 157/67 98.6 °F (37 °C) 92 26 96 %   10/24/22 1000 -- -- 92 -- --      Temp (24hrs), Av.5 °F (36.9 °C), Min:97.8 °F (36.6 °C), Max:99.1 °F (37.3 °C)        PHYSICAL EXAM:   66yo female, obese. pleasant/cooperative, no distress, appears stated age, alert/oriented, normal mood, patient moving neck and both upper extremities extensively throughout interview and exam without obvious discomfort, no swelling/tenderness to palpation of cervical and thoracic spine or left flank where patient indicated pain,   FROM cervical spine,mild pain with terminal flex/ext and roation. left shoulder/elbow/wrist/hand, strength is normal in both UE, strong radial pulses bilaterally, sensation intact to light touch in UE  No hoffmans or hyper reflexia. Imaging Review:   MRI CERV SPINE WO CONT 10/21/2022 17:19  INDICATION:   neck pain  COMPARISON: None.   TECHNIQUE: Multiplanar multisequence acquisition without contrast of the  cervical spine. CONTRAST: None. FINDINGS:  Evaluation is significantly limited by motion. Described stenoses are only  estimates. There is normal alignment of the cervical spine. Vertebral body heights are  maintained without evidence of acute fracture. Marrow signal is heterogeneous,  but within normal limits. Multilevel degenerative changes as detailed below. The  cervical cord is normal in size and signal. Region of the foramen magnum is  unremarkable. Incompletely evaluated mass in the posterior superficial right  parotid gland measuring 1.8 x 1.7. Incompletely evaluated mass in the posterior  superficial left parotid gland measuring 2.0 x 1.1 cm.  C2-C3: No significant disc herniation, spinal canal or neural foraminal  stenosis. C3-C4: Diffuse disc osteophyte complex with left-sided uncovertebral spurring. Mild to moderate spinal canal stenosis. Severe left and no right neural  foraminal stenosis. C4-C5: Diffuse disc osteophyte complex with left-sided uncovertebral spurring. Mild spinal canal stenosis. Severe left and no right neural foraminal stenosis. C5-C6: Mild diffuse disc osteophyte complex and left-sided uncovertebral  spurring. Mild spinal canal stenosis. Mild left and no right neural foraminal  stenosis. C6-C7: Mild diffuse disc osteophyte complex. Mild spinal canal stenosis. Mild  bilateral neural foraminal stenosis. C7-T1: Mild left facet arthropathy. No significant disc herniation, spinal canal  or neural foraminal stenosis. IMPRESSION  1. Evaluation is significantly limited by motion. Describes stenoses are only  estimates. 2. Mild to moderate spinal canal stenosis and severe left neural foraminal  stenosis at C3-C4. 3. Mild spinal canal stenosis and severe left neural foraminal stenosis at  C4-C5. 4. Remaining mild degenerative changes as detailed above.   5. 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 Outpatient ENT consultation is recommended. Labs:   Recent Results         Recent Results (from the past 24 hour(s))   GLUCOSE, POC     Collection Time: 10/23/22  4:43 PM   Result Value Ref Range     Glucose (POC) 198 (H) 65 - 117 mg/dL     Performed by [address] [place] [address] Sw, POC     Collection Time: 10/23/22  9:59 PM   Result Value Ref Range     Glucose (POC) 244 (H) 65 - 117 mg/dL     Performed by Guinevere Bence     CBC WITH AUTOMATED DIFF     Collection Time: 10/24/22  3:40 AM   Result Value Ref Range     WBC 6.9 3.6 - 11.0 K/uL     RBC 2.68 (L) 3.80 - 5.20 M/uL     HGB 8.1 (L) 11.5 - 16.0 g/dL     HCT 26.7 (L) 35.0 - 47.0 %     MCV 99.6 (H) 80.0 - 99.0 FL     MCH 30.2 26.0 - 34.0 PG     MCHC 30.3 30.0 - 36.5 g/dL     RDW 16.3 (H) 11.5 - 14.5 %     PLATELET 652 209 - 212 K/uL     MPV 9.5 8.9 - 12.9 FL     NRBC 0.0 0  WBC     ABSOLUTE NRBC 0.00 0.00 - 0.01 K/uL     NEUTROPHILS 57 32 - 75 %     LYMPHOCYTES 28 12 - 49 %     MONOCYTES 9 5 - 13 %     EOSINOPHILS 5 0 - 7 %     BASOPHILS 1 0 - 1 %     IMMATURE GRANULOCYTES 0 0.0 - 0.5 %     ABS. NEUTROPHILS 3.9 1.8 - 8.0 K/UL     ABS. LYMPHOCYTES 2.0 0.8 - 3.5 K/UL     ABS. MONOCYTES 0.6 0.0 - 1.0 K/UL     ABS. EOSINOPHILS 0.3 0.0 - 0.4 K/UL     ABS. BASOPHILS 0.1 0.0 - 0.1 K/UL     ABS. IMM.  GRANS. 0.0 0.00 - 0.04 K/UL     DF AUTOMATED     RENAL FUNCTION PANEL     Collection Time: 10/24/22  3:48 AM   Result Value Ref Range     Sodium 139 136 - 145 mmol/L     Potassium 4.0 3.5 - 5.1 mmol/L     Chloride 104 97 - 108 mmol/L     CO2 25 21 - 32 mmol/L     Anion gap 10 5 - 15 mmol/L     Glucose 172 (H) 65 - 100 mg/dL     BUN 35 (H) 6 - 20 MG/DL     Creatinine 6.23 (H) 0.55 - 1.02 MG/DL     BUN/Creatinine ratio 6 (L) 12 - 20       eGFR 7 (L) >60 ml/min/1.73m2     Calcium 7.9 (L) 8.5 - 10.1 MG/DL     Phosphorus 5.5 (H) 2.6 - 4.7 MG/DL     Albumin 2.7 (L) 3.5 - 5.0 g/dL   GLUCOSE, POC Collection Time: 10/24/22  8:36 AM   Result Value Ref Range     Glucose (POC) 173 (H) 65 - 117 mg/dL     Performed by Enma RACHEL     GLUCOSE, POC     Collection Time: 10/24/22 11:53 AM   Result Value Ref Range     Glucose (POC) 213 (H) 65 - 117 mg/dL     Performed by Enma RACHEL                 Impression:           Patient Active Problem List     Diagnosis Date Noted    Chest pain at rest 02/21/2014    ASHD (arteriosclerotic heart disease) 02/21/2014    Diabetes mellitus ([facility] 02/21/2014    Hypertension, benign 02/21/2014    ASO (arteriosclerosis obliterans) 02/21/2014    Hypercholesteremia 02/21/2014    Symptomatic anemia 10/19/2022    SOB (shortness of breath) 05/15/2022    Acute renal failure (ARF) ([facility] 12/02/2021    NSTEMI (non-ST elevated [facility] infarction) [facility] 04/25/2019    Bilateral leg edema 03/15/2018      Active Problems:    Symptomatic anemia (10/19/2022)     Cervical spine stenosis     Plan:   67F with multiple medical issues, being treated for exacerbation of her pre-existing medical issues, ortho consult for neck pain and left thoracic pain. MRI shows some cervical DDD, facet and foraminal degenerative changes. In light of her current medical issues I discussed her neurologic compression, and conservative treatment options. Pt does not wish to consider surgical options, nor is she ideal candidate. Appears to have an acute flare of underlying DDD and foraminal stenosis. Has improved significantly since admission. No acute neurologic deficits or indication for further acute interventions as inpatient. Recommended starting flexeril and gabapentin. Patient declined gabapentin as this has not helped in the past. Recommend office follow-up to discuss RANDALL or pain managenent as outpatient. Patient agrees.

## 2022-10-27 ENCOUNTER — TELEPHONE (OUTPATIENT)
Dept: CASE MANAGEMENT | Age: 67
End: 2022-10-27

## 2022-10-27 NOTE — TELEPHONE ENCOUNTER
HF NN attempted to reach patient for follow up phone call. No voicemail message was left as there was no voicemail greeting identifying the owner of the phone.

## 2022-10-28 ENCOUNTER — TELEPHONE (OUTPATIENT)
Dept: CASE MANAGEMENT | Age: 67
End: 2022-10-28

## 2022-11-28 ENCOUNTER — OFFICE VISIT (OUTPATIENT)
Dept: CARDIOLOGY CLINIC | Age: 67
End: 2022-11-28
Payer: MEDICARE

## 2022-11-28 VITALS
SYSTOLIC BLOOD PRESSURE: 116 MMHG | HEIGHT: 64 IN | DIASTOLIC BLOOD PRESSURE: 70 MMHG | RESPIRATION RATE: 16 BRPM | OXYGEN SATURATION: 96 % | BODY MASS INDEX: 32.95 KG/M2 | HEART RATE: 91 BPM | WEIGHT: 193 LBS

## 2022-11-28 DIAGNOSIS — Z99.2 ESRD ON DIALYSIS (HCC): ICD-10-CM

## 2022-11-28 DIAGNOSIS — I25.10 CORONARY ARTERY DISEASE INVOLVING NATIVE CORONARY ARTERY OF NATIVE HEART WITHOUT ANGINA PECTORIS: Primary | ICD-10-CM

## 2022-11-28 DIAGNOSIS — E78.2 MIXED HYPERLIPIDEMIA: ICD-10-CM

## 2022-11-28 DIAGNOSIS — I10 BENIGN ESSENTIAL HTN: ICD-10-CM

## 2022-11-28 DIAGNOSIS — N18.6 ESRD ON DIALYSIS (HCC): ICD-10-CM

## 2022-11-28 DIAGNOSIS — Z72.0 TOBACCO USE: ICD-10-CM

## 2022-11-28 DIAGNOSIS — E11.8 DM TYPE 2, CONTROLLED, WITH COMPLICATION (HCC): ICD-10-CM

## 2022-11-28 PROCEDURE — G8400 PT W/DXA NO RESULTS DOC: HCPCS | Performed by: INTERNAL MEDICINE

## 2022-11-28 PROCEDURE — 3052F HG A1C>EQUAL 8.0%<EQUAL 9.0%: CPT | Performed by: INTERNAL MEDICINE

## 2022-11-28 PROCEDURE — G8417 CALC BMI ABV UP PARAM F/U: HCPCS | Performed by: INTERNAL MEDICINE

## 2022-11-28 PROCEDURE — 1101F PT FALLS ASSESS-DOCD LE1/YR: CPT | Performed by: INTERNAL MEDICINE

## 2022-11-28 PROCEDURE — 1090F PRES/ABSN URINE INCON ASSESS: CPT | Performed by: INTERNAL MEDICINE

## 2022-11-28 PROCEDURE — G8536 NO DOC ELDER MAL SCRN: HCPCS | Performed by: INTERNAL MEDICINE

## 2022-11-28 PROCEDURE — G8432 DEP SCR NOT DOC, RNG: HCPCS | Performed by: INTERNAL MEDICINE

## 2022-11-28 PROCEDURE — 3074F SYST BP LT 130 MM HG: CPT | Performed by: INTERNAL MEDICINE

## 2022-11-28 PROCEDURE — G8427 DOCREV CUR MEDS BY ELIG CLIN: HCPCS | Performed by: INTERNAL MEDICINE

## 2022-11-28 PROCEDURE — G9231 DOC ESRD DIA TRANS PREG: HCPCS | Performed by: INTERNAL MEDICINE

## 2022-11-28 PROCEDURE — 3078F DIAST BP <80 MM HG: CPT | Performed by: INTERNAL MEDICINE

## 2022-11-28 PROCEDURE — 3017F COLORECTAL CA SCREEN DOC REV: CPT | Performed by: INTERNAL MEDICINE

## 2022-11-28 PROCEDURE — 1123F ACP DISCUSS/DSCN MKR DOCD: CPT | Performed by: INTERNAL MEDICINE

## 2022-11-28 PROCEDURE — 99214 OFFICE O/P EST MOD 30 MIN: CPT | Performed by: INTERNAL MEDICINE

## 2022-11-28 PROCEDURE — 2022F DILAT RTA XM EVC RTNOPTHY: CPT | Performed by: INTERNAL MEDICINE

## 2022-11-28 RX ORDER — DICLOFENAC SODIUM 10 MG/G
GEL TOPICAL
COMMUNITY
Start: 2022-08-26

## 2022-11-28 RX ORDER — AMLODIPINE BESYLATE 10 MG/1
10 TABLET ORAL DAILY
COMMUNITY
Start: 2022-11-13

## 2022-11-28 RX ORDER — DULOXETIN HYDROCHLORIDE 20 MG/1
20 CAPSULE, DELAYED RELEASE ORAL 2 TIMES DAILY
COMMUNITY
Start: 2022-11-19

## 2022-11-28 RX ORDER — FUROSEMIDE 40 MG/1
40 TABLET ORAL DAILY
COMMUNITY
Start: 2022-11-13

## 2022-11-28 RX ORDER — POTASSIUM CHLORIDE 1500 MG/1
20 TABLET, FILM COATED, EXTENDED RELEASE ORAL DAILY
COMMUNITY
Start: 2022-10-03

## 2022-11-28 NOTE — PROGRESS NOTES
[facility] [place]: Joseph  [phone]    History of Present Illness:  Ms. Андрей Spicer is a 74 yo F with a history of coronary artery disease, previously followed by Dr. Marcin Chatterjee with PCI to circumflex in 10/2009, then PCI to a different area of the circumflex in 05/2010, cardiac catheterization noted small amount of dominant right coronary artery, echocardiogram in 12/2021 demonstrated preserved LV function and stress test with no ischemia, COPD followed by pulmonary though it has been several years, history of tobacco use working on cessation, type 2 diabetes, end stage renal disease on peritoneal dialysis, chronic neck pain with stenosis to C3-C5 followed by Orthopedics, Dr. Dao Varela, referred by Dr. Corrinne Distel to reestablish a cardiologist.  She is accompanied by her daughter-in-law. From a symptom standpoint, her breathing has been okay. No exertional chest pain. She does get occasional discomfort when she is just sitting, though this just happened twice this year. She did note some occasions when she was checking her blood pressure with the monitor that noted elevated heart rate in the one teens; however, she denies any palpitations. She is compensated on exam with clear lungs and trace lower extremity edema. Her blood pressure and heart rate are normal here. Soc hx. Tobacco 1 pack 1 week. Fam hx. Mom with CAD  Assessment and Plan:   1. Coronary artery disease. She is stable and compensated. She did have an echocardiogram and stress test in 12/2021 that were both okay. She is on the appropriate cardiac medications and regimen and no changes made. Would continue her beta blocker, statin, aspirin. She is not on an ACE inhibitor due to end stage renal disease. Will tentatively have her follow back in six months. 2. End stage renal disease on peritoneal dialysis. 3. Essential hypertension. Blood pressure is controlled. 4. Mixed hyperlipidemia. Tolerating statin. 5. Tobacco use.   Stressed the importance of cessation. 6. Chronic neck pain/stenosis of the cervical spine. Followed by orthopedics, Dr. Paulie Morales. Conservative management is recommended at this time. If she did need surgery, she would be low to moderate risk for cardiac complications. 7. COPD. She  has a past medical history of Asthma, CAD (coronary artery disease), CHF (congestive heart failure) ([address] Chronic kidney disease, COPD (chronic obstructive pulmonary disease) ([facility] [address] Diabetes [facility] GERD (gastroesophageal reflux disease), and Hypertension. All other systems negative except as above. PE  Vitals:    11/28/22 1436   BP: 116/70   Pulse: 91   Resp: 16   SpO2: 96%   Weight: 193 lb (87.5 kg)   Height: 5' 4\" (1.626 m)    Body mass index is 33.13 kg/m².   General appearance - alert, well appearing, and in no distress  Mental status - affect appropriate to mood  Eyes - sclera anicteric, moist mucous membranes  Neck - supple, no JVD  Chest - clear to auscultation, no wheezes, rales or rhonchi  Heart - normal rate, regular rhythm, normal S1, S2, no murmurs, rubs, clicks or gallops  Abdomen - soft, nontender, nondistended, no masses or organomegaly  Back exam - full range of motion, no tenderness  Neurological - no focal deficits  Extremities - peripheral pulses normal, no pedal edema      Recent Labs:  Lab Results   Component Value Date/Time    Cholesterol, total 260 (H) 07/02/2018 04:27 AM    HDL Cholesterol 31 07/02/2018 04:27 AM    LDL, calculated 197.2 (H) 07/02/2018 04:27 AM    Triglyceride 159 (H) 07/02/2018 04:27 AM    CHOL/HDL Ratio 8.4 (H) 07/02/2018 04:27 AM     Lab Results   Component Value Date/Time    Creatinine, POC 3.5 (H) 01/18/2022 06:42 AM    Creatinine 6.43 (H) 10/25/2022 04:46 AM     Lab Results   Component Value Date/Time    BUN 38 (H) 10/25/2022 04:46 AM     Lab Results   Component Value Date/Time    Potassium 3.8 10/25/2022 04:46 AM     Lab Results   Component Value Date/Time    Hemoglobin A1c 8.4 (H) 10/20/2022 01:34 PM     Lab Results   Component Value Date/Time    HGB 7.9 (L) 10/25/2022 04:46 AM     Lab Results   Component Value Date/Time    PLATELET 369  04:46 AM       Reviewed:  Past Medical History:   Diagnosis Date    Asthma     CAD (coronary artery disease)     CHF (congestive heart failure) ([facility])     Chronic kidney disease     HD M-W- Radu Menon [phone]    COPD (chronic obstructive pulmonary disease) ([facility])     Diabetes ([facility])     DM2    GERD (gastroesophageal reflux disease)     Hypertension      Social History     Tobacco Use   Smoking Status Former    Packs/day: 0.25    Years: 10.00    Pack years: 2.50    Types: Cigarettes    Quit date: 2021    Years since quittin.0   Smokeless Tobacco Never     Social History     Substance and Sexual Activity   Alcohol Use No     Allergies   Allergen Reactions    Ivp Dye [Fd And C Blue No.1] Hives       Current Outpatient Medications   Medication Sig    amLODIPine (NORVASC) 10 mg tablet Take 10 mg by mouth daily. diclofenac (VOLTAREN) 1 % gel     DULoxetine (CYMBALTA) 20 mg capsule Take 20 mg by mouth two (2) times a day. furosemide (LASIX) 40 mg tablet Take 40 mg by mouth daily. potassium chloride SR (K-TAB) 20 mEq tablet Take 20 mEq by mouth daily. lactulose 10 gram/15 mL (15 mL) soln Take 30 mL by mouth two (2) times daily as needed for PRN Reason (Other) (Constipation). carvediloL (COREG) 25 mg tablet Take 25 mg by mouth two (2) times daily (with meals). dulaglutide (Trulicity) 4.30 DK/4.1 mL sub-q pen 0.75 mg by SubCUTAneous route every seven (7) days. insulin glargine (LANTUS,BASAGLAR) 100 unit/mL (3 mL) inpn 20 Units by SubCUTAneous route daily. rosuvastatin (CRESTOR) 20 mg tablet Take 0.5 Tablets by mouth nightly. Take 1 Tab by mouth nightly. Dose reduced from 20 mg to 10 mg. (Patient taking differently: Take 10 mg by mouth nightly. Take 1 Tab by mouth nightly.   Dose reduced from 20 mg to 10 mg.)    epoetin joni-epbx (RETACRIT) 20,000 unit/mL injection 1 mL by SubCUTAneous route every Monday, Wednesday, Friday. Indications: anemia due to kidney failure  Indications: anemia due to kidney failure    hydrALAZINE (APRESOLINE) 50 mg tablet Take 1 Tablet by mouth three (3) times daily. mometasone-formoterol (DULERA) 200-5 mcg/actuation HFA inhaler Take 2 Puffs by inhalation two (2) times a day. insulin NPH/insulin regular (NOVOLIN 70/30 U-100 INSULIN) 100 unit/mL (70-30) injection 45 Units by SubCUTAneous route two (2) times a day. albuterol-ipratropium (DUO-NEB) 2.5 mg-0.5 mg/3 ml nebu 3 mL by Nebulization route every six (6) hours as needed. montelukast (SINGULAIR) 10 mg tablet Take 1 Tab by mouth nightly. aspirin (ASPIRIN) 325 mg tablet Take 325 mg by mouth daily. No current facility-administered medications for this visit.        Hortensia Phan MD  [facility] [place]  [address] [facility] [address]  [address]

## 2022-11-28 NOTE — LETTER
Patient:  Janee Moya   YOB: 1955  Date of Visit: 11/28/2022      Dear Sonia Yee MD  [address]  [address] [facility]  [place] [address]  Via Fax: [phone]:      Ms. Yu Ramirez is a 74 yo F with a history of coronary artery disease, previously followed by Dr. Kenton Mooney with PCI to circumflex in 10/2009, then PCI to a different area of the circumflex in 05/2010, cardiac catheterization noted small amount of dominant right coronary artery, echocardiogram in 12/2021 demonstrated preserved LV function and stress test with no ischemia, COPD followed by pulmonary though it has been several years, history of tobacco use working on cessation, type 2 diabetes, end stage renal disease on peritoneal dialysis, chronic neck pain with stenosis to C3-C5 followed by Orthopedics, Dr. Gabriele Solorio, referred by Dr. Orinda Cabot to reestablish a cardiologist.  She is accompanied by her daughter-in-law. From a symptom standpoint, her breathing has been okay. No exertional chest pain. She does get occasional discomfort when she is just sitting, though this just happened twice this year. She did note some occasions when she was checking her blood pressure with the monitor that noted elevated heart rate in the one teens; however, she denies any palpitations. She is compensated on exam with clear lungs and trace lower extremity edema. Her blood pressure and heart rate are normal here. Soc hx. Tobacco 1 pack 1 week. Fam hx. Mom with CAD  Assessment and Plan:   1. Coronary artery disease. She is stable and compensated. She did have an echocardiogram and stress test in 12/2021 that were both okay. She is on the appropriate cardiac medications and regimen and no changes made. Would continue her beta blocker, statin, aspirin. She is not on an ACE inhibitor due to end stage renal disease. Will tentatively have her follow back in six months. 2. End stage renal disease on peritoneal dialysis.    3. Essential hypertension. Blood pressure is controlled. 4. Mixed hyperlipidemia. Tolerating statin. 5. Tobacco use. Stressed the importance of cessation. 6. Chronic neck pain/stenosis of the cervical spine. Followed by orthopedics, Dr. Amber Suárez. Conservative management is recommended at this time. If she did need surgery, she would be low to moderate risk for cardiac complications. 7. COPD. If you have questions, please do not hesitate to call me.      Sincerely,      Jordy Wong MD

## 2023-01-09 ENCOUNTER — HOSPITAL ENCOUNTER (OUTPATIENT)
Age: 68
Setting detail: OUTPATIENT SURGERY
Discharge: HOME | End: 2023-01-09
Attending: INTERNAL MEDICINE | Admitting: INTERNAL MEDICINE
Payer: MEDICARE

## 2023-01-09 ENCOUNTER — ANESTHESIA (OUTPATIENT)
Dept: ENDOSCOPY | Age: 68
End: 2023-01-09
Payer: MEDICARE

## 2023-01-09 ENCOUNTER — ANESTHESIA EVENT (OUTPATIENT)
Dept: ENDOSCOPY | Age: 68
End: 2023-01-09
Payer: MEDICARE

## 2023-01-09 VITALS
HEIGHT: 65 IN | OXYGEN SATURATION: 97 % | DIASTOLIC BLOOD PRESSURE: 81 MMHG | RESPIRATION RATE: 15 BRPM | BODY MASS INDEX: 32.65 KG/M2 | WEIGHT: 196 LBS | HEART RATE: 109 BPM | SYSTOLIC BLOOD PRESSURE: 182 MMHG | TEMPERATURE: 98.3 F

## 2023-01-09 LAB
ANION GAP BLD CALC-SCNC: 12 MMOL/L (ref 10–20)
ANION GAP BLD CALC-SCNC: 13 MMOL/L (ref 10–20)
CA-I BLD-MCNC: 0.92 MMOL/L (ref 1.12–1.32)
CA-I BLD-MCNC: 0.93 MMOL/L (ref 1.12–1.32)
CHLORIDE BLD-SCNC: 98 MMOL/L (ref 98–107)
CHLORIDE BLD-SCNC: 99 MMOL/L (ref 98–107)
CO2 BLD-SCNC: 29.5 MMOL/L (ref 21–32)
CO2 BLD-SCNC: 30.6 MMOL/L (ref 21–32)
CREAT BLD-MCNC: 5.31 MG/DL (ref 0.6–1.3)
CREAT BLD-MCNC: 5.65 MG/DL (ref 0.6–1.3)
GLUCOSE BLD-MCNC: 159 MG/DL (ref 65–100)
GLUCOSE BLD-MCNC: 166 MG/DL (ref 65–100)
POTASSIUM BLD-SCNC: 2.9 MMOL/L (ref 3.5–5.1)
POTASSIUM BLD-SCNC: 3.2 MMOL/L (ref 3.5–5.1)
SERVICE CMNT-IMP: ABNORMAL
SERVICE CMNT-IMP: ABNORMAL
SODIUM BLD-SCNC: 140 MMOL/L (ref 136–145)
SODIUM BLD-SCNC: 140 MMOL/L (ref 136–145)

## 2023-01-09 PROCEDURE — 80047 BASIC METABLC PNL IONIZED CA: CPT

## 2023-01-09 PROCEDURE — 76060000033 HC ANESTHESIA 1 TO 1.5 HR: Performed by: INTERNAL MEDICINE

## 2023-01-09 PROCEDURE — 77030038665 HC SOL ELEVIEW INJ AGNT ARPH -B: Performed by: INTERNAL MEDICINE

## 2023-01-09 PROCEDURE — 76040000008: Performed by: INTERNAL MEDICINE

## 2023-01-09 PROCEDURE — 77030003657 HC NDL SCLER BSC -B: Performed by: INTERNAL MEDICINE

## 2023-01-09 PROCEDURE — 77030010936 HC CLP LIG BSC -C: Performed by: INTERNAL MEDICINE

## 2023-01-09 PROCEDURE — 2709999900 HC NON-CHARGEABLE SUPPLY: Performed by: INTERNAL MEDICINE

## 2023-01-09 PROCEDURE — 77030019988 HC FCPS ENDOSC DISP BSC -B: Performed by: INTERNAL MEDICINE

## 2023-01-09 PROCEDURE — 88305 TISSUE EXAM BY PATHOLOGIST: CPT

## 2023-01-09 PROCEDURE — 74011000250 HC RX REV CODE- 250: Performed by: ANESTHESIOLOGY

## 2023-01-09 PROCEDURE — 74011250636 HC RX REV CODE- 250/636: Performed by: ANESTHESIOLOGY

## 2023-01-09 PROCEDURE — 77030013992 HC SNR POLYP ENDOSC BSC -B: Performed by: INTERNAL MEDICINE

## 2023-01-09 PROCEDURE — 74011250636 HC RX REV CODE- 250/636: Performed by: INTERNAL MEDICINE

## 2023-01-09 DEVICE — WORKING LENGTH 235CM, WORKING CHANNEL 2.8MM
Type: IMPLANTABLE DEVICE | Site: DESCENDING COLON | Status: FUNCTIONAL
Brand: RESOLUTION 360 CLIP

## 2023-01-09 RX ORDER — PROPOFOL 10 MG/ML
INJECTION, EMULSION INTRAVENOUS AS NEEDED
Status: DISCONTINUED | OUTPATIENT
Start: 2023-01-09 | End: 2023-01-09 | Stop reason: HOSPADM

## 2023-01-09 RX ORDER — SODIUM CHLORIDE 9 MG/ML
25 INJECTION, SOLUTION INTRAVENOUS CONTINUOUS
Status: DISCONTINUED | OUTPATIENT
Start: 2023-01-09 | End: 2023-01-09 | Stop reason: HOSPADM

## 2023-01-09 RX ORDER — LIDOCAINE HYDROCHLORIDE 20 MG/ML
INJECTION, SOLUTION EPIDURAL; INFILTRATION; INTRACAUDAL; PERINEURAL AS NEEDED
Status: DISCONTINUED | OUTPATIENT
Start: 2023-01-09 | End: 2023-01-09 | Stop reason: HOSPADM

## 2023-01-09 RX ADMIN — SODIUM CHLORIDE 25 ML/HR: 9 INJECTION, SOLUTION INTRAVENOUS at 15:24

## 2023-01-09 RX ADMIN — LIDOCAINE HYDROCHLORIDE 100 MG: 20 INJECTION, SOLUTION EPIDURAL; INFILTRATION; INTRACAUDAL; PERINEURAL at 15:32

## 2023-01-09 RX ADMIN — PROPOFOL 450 MG: 10 INJECTION, EMULSION INTRAVENOUS at 16:23

## 2023-01-09 NOTE — ANESTHESIA PREPROCEDURE EVALUATION
Relevant Problems   CARDIOVASCULAR   (+) ASHD (arteriosclerotic heart disease)   (+) Hypertension, benign   (+) NSTEMI (non-ST elevated myocardial infarction) (HCC)      RENAL FAILURE   (+) Acute renal failure (ARF) (HCC)      ENDOCRINE   (+) Diabetes mellitus (HCC)       Anesthetic History   No history of anesthetic complications            Review of Systems / Medical History  Patient summary reviewed, nursing notes reviewed and pertinent labs reviewed    Pulmonary    COPD: moderate        Asthma        Neuro/Psych   Within defined limits           Cardiovascular    Hypertension          Past MI, CAD and hyperlipidemia    Exercise tolerance: >4 METS  Comments: TTE (12/2021): LV: Estimated LVEF is 60 - 65%. Visually measured ejection fraction. Normal cavity size, wall thickness and systolic function (ejection fraction normal). Wall motion: normal. Mild (grade 1) left ventricular diastolic dysfunction. LA: Mildly dilated left atrium. TV: Mild tricuspid valve regurgitation is present.      GI/Hepatic/Renal     GERD: well controlled    Renal disease: ESRD and dialysis       Endo/Other    Diabetes: type 2, using insulin    Obesity and anemia     Other Findings   Comments: Nightly PD           Physical Exam    Airway  Mallampati: II  TM Distance: 4 - 6 cm  Neck ROM: normal range of motion   Mouth opening: Normal     Cardiovascular  Regular rate and rhythm,  S1 and S2 normal,  no murmur, click, rub, or gallop  Rhythm: regular  Rate: normal         Dental  No notable dental hx       Pulmonary  Breath sounds clear to auscultation               Abdominal  GI exam deferred       Other Findings            Anesthetic Plan    ASA: 3  Anesthesia type: MAC          Induction: Intravenous  Anesthetic plan and risks discussed with: Patient

## 2023-01-09 NOTE — DISCHARGE INSTRUCTIONS
Nydia Krueger  [id number]  1955    It was my pleasure seeing you for your procedure. You will also receive a summary report with the findings from this procedure and any further recommendations. If you had polyps removed or biopsies taken during your procedure, you will receive a separate letter from me within the next 2 weeks. If you don't receive this letter or if you have any questions, please call my office [phone]. Please take note of the post procedure instructions listed below. Best Wishes,    Dr. Maribel Segura    These instructions give you information on caring for yourself after your procedure. Call your doctor if you have any problems or questions after your procedure. HOME CARE  Walk if you have belly cramping or gas. Walking will help get rid of the air and reduce the bloated feeling in your belly (abdomen). Your IV site (where you received drugs) may be tender to touch. Place warm towels on the site; keep your arm up on two pillows if you have any swelling or soreness in the area. You may shower. ACTIVITY:  Take frequent rest periods and move at a slower pace for the next 24 hours. .  You may resume your regular activity tomorrow if you are feeling back to normal.  Do not drive or ride a bicycle for at least 24 hours (because of the medicine (anesthesia) used during the test). Do not sign any important legal documents or use or operate any machinery for 24 hours  Do not take sleeping medicines/nerve drugs for 24 hours unless the doctor tells you. You can return to work/school tomorrow unless otherwise instructed. NUTRITION:  Drink plenty of fluids to keep your pee (urine) clear or pale yellow  Begin with a light meal and progress to your normal diet. Heavy or fried foods are harder to digest and may make you feel sick to your stomach (nauseated).   Once you are feeling back to normal, you may resume your normal diet as instructed by your doctor. Avoid alcoholic beverages for 24 hours or as instructed. IF YOU HAD BIOPSIES TAKEN OR POLYPS REMOVED DURING THE PROCEDURE:  For the next 7 days, avoid all non-steroidal antiinflammatory medications such as Ibuprofen, Motrin, Advil, Alleve, Raven-seltzer, Goody's powder, BC powder. If you do not have an heart condition that requires you to take a daily aspirin, you should avoid taking aspirin for 7 days. Eat a soft diet for 24 hours. Monitor your stools for any blood or dark black, tar-like, stools as this may be a sign of bleeding and if you see any blood, notify your doctor immediately. GET HELP RIGHT AWAY AND SEEK IMMEDIATE MEDICAL CARE IF:  You have more than a spotting of blood in your stool. You pass clumps of tissue (blood clots) or fill the toilet with blood. Your belly is painfully swollen or puffy (abdominal distention). You throw up (vomit). You have a fever. You have redness, pain or swelling at the IV site that last greater than two days. You have abdominal pain or discomfort that is severe or gets worse throughout the day. Post-procedure diagnosis:  EGD: Gasytitis  Colon: polyps    Post-procedure recommendations:  - If biopsies were obtained, await pathology. You should receive a letter within 2 weeks. - Resume normal medications.  - Recommend repeat colonoscopy pending path, likely 3 years  - ok to resume aspirin  - discussed with Lino        Learning About Coronavirus (COVID-19)  Coronavirus (COVID-19): Overview  What is coronavirus (COVID-19)? The coronavirus disease (COVID-19) is caused by a virus. It is an illness that was first found in [place], [place], in December 2019. It has since spread worldwide. The virus can cause fever, cough, and trouble breathing. In severe cases, it can cause pneumonia and make it hard to breathe without help. It can cause death. Coronaviruses are a large group of viruses. They cause the common cold.  They also cause more serious illnesses like Middle East respiratory syndrome (MERS) and severe acute respiratory syndrome (SARS). COVID-19 is caused by a novel coronavirus. That means it's a new type that has not been seen in people before. This virus spreads person-to-person through droplets from coughing and sneezing. It can also spread when you are close to someone who is infected. And it can spread when you touch something that has the virus on it, such as a doorknob or a tabletop. What can you do to protect yourself from coronavirus (COVID-19)? The best way to protect yourself from getting sick is to: Avoid areas where there is an outbreak. Avoid contact with people who may be infected. Wash your hands often with soap or alcohol-based hand sanitizers. Avoid crowds and try to stay at least 6 feet away from other people. Wash your hands often, especially after you cough or sneeze. Use soap and water, and scrub for at least 20 seconds. If soap and water aren't available, use an alcohol-based hand . Avoid touching your mouth, nose, and eyes. What can you do to avoid spreading the virus to others? To help avoid spreading the virus to others:  Cover your mouth with a tissue when you cough or sneeze. Then throw the tissue in the trash. Use a disinfectant to clean things that you touch often. Stay home if you are sick or have been exposed to the virus. Don't go to school, work, or public areas. And don't use public transportation. If you are sick:  Leave your home only if you need to get medical care. But call the doctor's office first so they know you're coming. And wear a face mask, if you have one. If you have a face mask, wear it whenever you're around other people. It can help stop the spread of the virus when you cough or sneeze. Clean and disinfect your home every day. Use household  and disinfectant wipes or sprays. Take special care to clean things that you grab with your hands.  These include doorknobs, remote controls, phones, and handles on your refrigerator and microwave. And don't forget countertops, tabletops, bathrooms, and computer keyboards. When to call for help  Call 911 anytime you think you may need emergency care. For example, call if:  You have severe trouble breathing. (You can't talk at all.)  You have constant chest pain or pressure. You are severely dizzy or lightheaded. You are confused or can't think clearly. Your face and lips have a blue color. You pass out (lose consciousness) or are very hard to wake up. Call your doctor now if you develop symptoms such as:  Shortness of breath. Fever. Cough. If you need to get care, call ahead to the doctor's office for instructions before you go. Make sure you wear a face mask, if you have one, to prevent exposing other people to the virus. Where can you get the latest information? The following health organizations are tracking and studying this virus. Their websites contain the most up-to-date information. Patience Phan also learn what to do if you think you may have been exposed to the virus. U.S. Centers for Disease Control and Prevention (CDC): The CDC provides updated news about the disease and travel advice. The website also tells you how to prevent the spread of infection. www.cdc.gov  World Health Organization [facility]): WHO offers information about the virus outbreaks. WHO also has travel advice. www.who.int  Current as of: April 1, 2020               Content Version: 12.4  © 2006-2020 [employer]. Care instructions adapted under license by your healthcare professional. If you have questions about a medical condition or this instruction, always ask your healthcare professional. [place] [address] any warranty or liability for your use of this information.              Patient Education on Sedation / Analgesia Administered for Procedure      For 24 hours after general anesthesia or intravenous analgesia / sedation:  Have someone responsible help you with your care  Limit your activities  Do not drive and operate hazardous machinery  Do not make important personal, legal or business decisions  Do not drink alcoholic beverages  If you have not urinated within 8 hours after discharge, please contact your physician  Resume your medications unless otherwise instructed    For 24 hours after general anesthesia or intravenous analgesia / sedation  you may experience:  Drowsiness, dizziness, sleepiness, or confusion  Difficulty remembering or delayed reaction times  Difficulty with your balance, especially while walking, move slowly and carefully, do not make sudden position changes  Difficulty focusing or blurred vision    You may not be aware of slight changes in your behavior and/or your reaction time because of the medication used during and after your procedure.     Report the following to your physician:  Excessive pain, swelling, redness or odor of or around the surgical area  Temperature over 100.5  Nausea and vomiting lasting longer than 4 hours or if unable to take medications  Any signs of decreased circulation or nerve impairment to extremity: change in color, persistent numbness, tingling, coldness or increase pain  Any questions or concerns    IF YOU REPORT TO AN EMERGENCY ROOM, DOCTOR'S OFFICE OR HOSPITAL WITHIN 24 HOURS AFTER YOUR PROCEDURE, BRING THIS SHEET AND YOUR AFTER VISIT SUMMARY WITH YOU AND GIVE IT TO THE PHYSICIAN OR NURSE ATTENDING YOU.

## 2023-01-09 NOTE — PROCEDURES
Colonoscopy and EGD Procedure Note        : Jabier Ricardo MD    Staff: Endoscopy Technician-1: Tyrone Markham  Endoscopy RN-1: Jan Jordan RN    Referring Provider: Ajith Mendez MD    Sedation:  MAC anesthesia Propofol    Procedure Details:  After informed consent was obtained with all risks and benefits of procedure explained and pre-operative exam completed, pt was placed in the left lateral decubitus position. Following sequential administration of sedation as per above, the gastroscope was inserted into the mouth and advanced under direct vision to second portion of the duodenum. A careful inspection was made as the gastroscope was withdrawn, including a retroflexed view of the proximal stomach; findings and interventions are described below. EGD Findings:  Esophagus:Normal esophagus. EGJ at 38cm and regular. Gastric retroflexion with intact GEFV. Stomach:few fundic gland polyps. Diffuse gastropathy mild, not bleeding, biopsied cold forceps with minimal bleeding antrum, incisura, body, cardia, to r/o H pylori. Duodenum/jejunum: Normal mucosa and vascular pattern, mild melanosis with iron deposition likely, normal mucosa and vascular pattern, biopsied cold forceps to r/o celiac    The bed was then turned and upon sequential sedation as per above, a digital rectal exam was performed per below. The Olympus videocolonoscope was inserted in the rectum and carefully advanced to the cecum, which was identified by the ileocecal valve and appendiceal orifice, terminal ileum. The quality of preparation was excellent BPS 9. The colonoscope was slowly withdrawn with careful evaluation between folds. Retroflexion in the rectum was performed.      Colon Findings:   NIKA: unremarkable  Rectum: normal  no mucosal lesion appreciated  Sigmoid: two sessile polyps ranging 6-8mm removed with cold snare polypectomy, retrieved, minimal bleeding  Descending Colon: four sessile polyps ranging 4-10mm removed with cold snare polypectomy, retrieved, minimal bleeding  Transverse Colon: one large 30mm pedunculated polyp removed with hot snare polypectomy, retrieved, no bleeding. Two clips were placed to prevent postpolypectomy bleeding in setting of full dose aspirin use. 1cc hawk ink injected 5cm distal to the resection site for endoscopic surveillance. Ascending Colon: one 4mm sessile polyp removed with cold snare polypectomy, retrieved, minimal bleeding  Cecum: normal  no mucosal lesion appreciated  Terminal Ileum: normal  no mucosal lesion appreciated          Specimens Removed:    ID Type Source Tests Collected by Time Destination   1 : duodenum bx Preservative Duodenum  Tia Dorman MD 1/9/2023 1548 Pathology   2 : gastritic bx Preservative Gastric  Amisha Chen MD 1/9/2023 1549 Pathology   3 : ascending colon polyp Preservative Colon, Ascending  Amisha Chen MD 1/9/2023 1552 Pathology   4 : transverse colon polyp Preservative Colon, Transverse  Amisha Chen MD 1/9/2023 1601 Pathology   5 : descending colon polyp Preservative Colon, Descending  Amisha Chen MD 1/9/2023 1602 Pathology   6 : sigmoid colon polyp Preservative Sigmoid  Amisha Chen MD 1/9/2023 1618 Pathology       Complications: None. EBL:  minimal    Impression:    See Postoperative diagnosis above    Recommendations:   - If biopsies were obtained, await pathology. You should receive a letter within 2 weeks. - Resume normal medications.  - Recommend repeat colonoscopy pending path, likely 3 years  - ok to resume aspirin  - discussed with Lino    Discharge Disposition:  Home in the company of a  when able to ambulate.     Gloria Mcgill MD  1/9/2023  4:21 PM

## 2023-01-09 NOTE — H&P
[facility]  Outpatient History and Physical    Patient: Samantasantino Flores    Physician: An Doss MD    Vital Signs: Blood pressure (!) 181/88, pulse (!) 117, temperature 98 °F (36.7 °C), resp. rate 17, height 5' 5\" (1.651 m), weight 88.9 kg (196 lb), SpO2 97 %, not currently breastfeeding. Allergies: Allergies   Allergen Reactions    Ivp Dye [Fd And C Blue No.1] Hives       Chief Complaint: dyspepsia, screening colonoscopy for kidney transplant evaluation here for egd / colonoscopy  \  History:  Past Medical History:   Diagnosis Date    Asthma     CAD (coronary artery disease)     CHF (congestive heart failure) ([facility])     Dr ?    Chronic kidney disease     HD  Radu Menon [phone]    COPD (chronic obstructive pulmonary disease) ([facility])     PCP manages    Diabetes ([facility] [address]     DM2    GERD (gastroesophageal reflux disease)     Hypertension       Past Surgical History:   Procedure Laterality Date    DELIVERY       HX CORONARY STENT PLACEMENT      multiple pt unsure of how many    HX HYSTERECTOMY      IR INSERT NON TUNL CVC OVER 5 YRS  2021    IR INSERT TUNL CVC W/O PORT OVER 5 YR  2021    peritoneal dialysis    ID UNLISTED PROCEDURE CARDIAC SURGERY      ID UNLISTED PROCEDURE VASCULAR SURGERY      leg stent      Social History     Socioeconomic History    Marital status: SINGLE   Tobacco Use    Smoking status: Former     Packs/day: 0.25     Years: 10.00     Pack years: 2.50     Types: Cigarettes    Smokeless tobacco: Never   Vaping Use    Vaping Use: Never used   Substance and Sexual Activity    Alcohol use: No    Drug use: Never      Family History   Problem Relation Age of Onset    Heart Disease Mother     Cancer Father        Medications:   Prior to Admission medications    Medication Sig Start Date End Date Taking? Authorizing Provider   amLODIPine (NORVASC) 10 mg tablet Take 10 mg by mouth daily.  22  Yes Provider, Historical   diclofenac (VOLTAREN) 1 % gel daily. 8/26/22  Yes Provider, Historical   DULoxetine (CYMBALTA) 20 mg capsule Take 20 mg by mouth two (2) times a day. 11/19/22  Yes Provider, Historical   furosemide (LASIX) 40 mg tablet Take 40 mg by mouth daily. 11/13/22  Yes Provider, Historical   lactulose 10 gram/15 mL (15 mL) soln Take 30 mL by mouth two (2) times daily as needed for PRN Reason (Other) (Constipation). 6/1/22  Yes Provider, Historical   carvediloL (COREG) 25 mg tablet Take 25 mg by mouth two (2) times daily (with meals). Yes Provider, Historical   dulaglutide (Trulicity) 9.91 /1.2 mL sub-q pen 0.75 mg by SubCUTAneous route every seven (7) days. Yes Provider, Historical   insulin glargine (LANTUS,BASAGLAR) 100 unit/mL (3 mL) inpn 20 Units by SubCUTAneous route daily. Yes Provider, Historical   rosuvastatin (CRESTOR) 20 mg tablet Take 0.5 Tablets by mouth nightly. Take 1 Tab by mouth nightly. Dose reduced from 20 mg to 10 mg. Patient taking differently: Take 10 mg by mouth nightly. Take 1 Tab by mouth nightly. Dose reduced from 20 mg to 10 mg. 12/10/21  Yes Tommy Christian MD   epoetin joni-epbx (RETACRIT) 20,000 unit/mL injection 1 mL by SubCUTAneous route every Monday, Wednesday, Friday. Indications: anemia due to kidney failure  Indications: anemia due to kidney failure 12/10/21  Yes Tommy Christian MD   hydrALAZINE (APRESOLINE) 50 mg tablet Take 1 Tablet by mouth three (3) times daily. 12/10/21  Yes Tommy Christian MD   mometasone-formoterol (DULERA) 200-5 mcg/actuation HFA inhaler Take 2 Puffs by inhalation two (2) times a day. Yes Provider, Historical   insulin NPH/insulin regular (NOVOLIN 70/30 U-100 INSULIN) 100 unit/mL (70-30) injection 45 Units by SubCUTAneous route two (2) times a day. 7/9/18  Yes Esperanza Vizcaino MD   albuterol-ipratropium (DUO-NEB) 2.5 mg-0.5 mg/3 ml nebu 3 mL by Nebulization route every six (6) hours as needed.  3/19/18  Yes Esperanza Vizcaino MD   montelukast (SINGULAIR) 10 mg tablet Take 1 Tab by mouth nightly. 3/19/18  Yes Rashad Jeffries MD   aspirin (ASPIRIN) 325 mg tablet Take 325 mg by mouth daily. Yes Other, MD Tony       Physical Exam:   General: alert, no distress   HEENT: Head: Normocephalic, no lesions, without obvious abnormality. Heart: regular rate and rhythm, S1, S2 normal, no murmur, click, rub or gallop   Lungs: chest clear, no wheezing, rales, normal symmetric air entry   Abdominal: Bowel sounds are normal, liver is not enlarged, spleen is not enlarged   Neurological: Grossly normal   Extremities: extremities normal, atraumatic, no cyanosis or edema     Plan of Care/Planned Procedure: EGD / Colonoscopy    The heart, lungs and mental status were satisfactory for the administration of MAC sedation and for the procedure. Informed consent was obtained for the procedure, including sedation. Risks of perforation, hemorrhage, adverse drug reaction, and aspiration were discussed. The risks, benefits and alternatives were again reiterated to the patient to include the risk of infection, bleeding, medication reaction, aspiration, perforation which could require immediate surgery, cardiopulmonary complication, issues with anesthesia and death. The patient was counseled at length about the risks of nam Covid-19 in the roshan-operative and post-operative states including the recovery window of their procedure. The patient was made aware that nam Covid-19 after a surgical procedure may worsen their prognosis for recovering from the virus and lend to a higher morbidity and or mortality risk. The patient was given the options of postponing their procedure. All of the risks, benefits, and alternatives were discussed. The patient does  wish to proceed with the procedure.

## 2023-01-09 NOTE — ANESTHESIA POSTPROCEDURE EVALUATION
Procedure(s):  COLONOSCOPY  ESOPHAGOGASTRODUODENOSCOPY (EGD)  ESOPHAGOGASTRODUODENAL (EGD) BIOPSY  ENDOSCOPIC POLYPECTOMY  INJECTION. MAC, total IV anesthesia    Anesthesia Post Evaluation        Patient location during evaluation: PACU  Note status: Adequate. Level of consciousness: responsive to verbal stimuli and sleepy but conscious  Pain management: satisfactory to patient  Airway patency: patent  Anesthetic complications: no  Cardiovascular status: acceptable  Respiratory status: acceptable  Hydration status: acceptable  Comments: +Post-Anesthesia Evaluation and Assessment    Patient: Treva Leonardo MRN: [id number]  SSN: [id number]   YOB: 1955  Age: 75 y.o. Sex: female      Cardiovascular Function/Vital Signs    BP (!) 187/76   Pulse (!) 111   Temp 36.8 °C (98.3 °F)   Resp 15   Ht 5' 5\" (1.651 m)   Wt 88.9 kg (196 lb)   SpO2 94%   Breastfeeding No   BMI 32.62 kg/m²     Patient is status post Procedure(s):  COLONOSCOPY  ESOPHAGOGASTRODUODENOSCOPY (EGD)  ESOPHAGOGASTRODUODENAL (EGD) BIOPSY  ENDOSCOPIC POLYPECTOMY  INJECTION. Nausea/Vomiting: Controlled. Postoperative hydration reviewed and adequate. Pain:  Pain Scale 1: Numeric (0 - 10) (01/09/23 1636)  Pain Intensity 1: 0 (01/09/23 1636)   Managed. Neurological Status: At baseline. Mental Status and Level of Consciousness: Arousable. Pulmonary Status:   O2 Device: None (Room air) (01/09/23 1636)   Adequate oxygenation and airway patent. Complications related to anesthesia: None    Post-anesthesia assessment completed. No concerns. Signed By: Shawnee Cochran DO    1/9/2023  Post anesthesia nausea and vomiting:  controlled      INITIAL Post-op Vital signs:   Vitals Value Taken Time   /121 01/09/23 1642   Temp 36.8 °C (98.3 °F) 01/09/23 1636   Pulse 112 01/09/23 1643   Resp 30 01/09/23 1643   SpO2 95 % 01/09/23 1643   Vitals shown include unvalidated device data. Detail Level: Detailed

## 2023-01-09 NOTE — PERIOP NOTES
Endoscopy Case End Note:    1623:  Procedure scope was pre-cleaned, per protocol, at bedside by ALLIE Sauer. 1630:  Report received from anesthesia - Dr. Nerissa Acevedo. See anesthesia flowsheet for intra-procedure vital signs and events. 1630:  Glasses returned to patient.

## 2023-01-09 NOTE — ROUTINE PROCESS
[id number] Cap  1955  [id number]    Situation:  Verbal report received from: Neel Burton RN  Procedure: Procedure(s):  COLONOSCOPY  ESOPHAGOGASTRODUODENOSCOPY (EGD)  ESOPHAGOGASTRODUODENAL (EGD) BIOPSY  ENDOSCOPIC POLYPECTOMY  INJECTION    Background:    Preoperative diagnosis: Irritable bowel syndrome with constipation [K58.1]  End stage renal disease ([facility] [address] [N18.6]  Peritoneal dialysis status ([facility] [address] [Z99.2]  Nausea and vomiting, unspecified vomiting type [R11.2]  Special screening for malignant neoplasms, colon [Z12.11]  Dyspepsia [R10.13]  Postoperative diagnosis: EGD: Gasytitis  Colon: polyps    :  Dr. Anant Bianchi   Assistant(s): Endoscopy Technician-1: Gwen Bowling  Endoscopy RN-1: Jose Paredes RN    Specimens:   ID Type Source Tests Collected by Time Destination   1 : duodenum bx Preservative Duodenum  Andreina Osborne MD 2023 1548 Pathology   2 : gastritic bx Preservative Gastric  Andreina Osborne MD 2023 1549 Pathology   3 : ascending colon polyp Preservative Colon, Ascending  Andreina Osborne MD 2023 1552 Pathology   4 : transverse colon polyp Preservative Colon, Transverse  Andreina Osborne MD 2023 1601 Pathology   5 : descending colon polyps Preservative Colon, Descending  Andreina Osborne MD 2023 1602 Pathology   6 : sigmoid colon polyps Preservative Sigmoid  Andreina Osborne MD 2023 1618 Pathology     H. Pylori  no    Assessment:  Intra-procedure medications     Anesthesia gave intra-procedure sedation and medications, see anesthesia flow sheet yes    Intravenous fluids: NS@ KVO     Vital signs stable     Abdominal assessment: round and soft     Recommendation:  Discharge patient per MD order.   Return to floor  Family or Friend   Permission to share finding with family or friend yes

## 2023-03-03 ENCOUNTER — APPOINTMENT (OUTPATIENT)
Dept: CT IMAGING | Age: 68
DRG: 602 | End: 2023-03-03
Attending: NURSE PRACTITIONER
Payer: MEDICARE

## 2023-03-03 ENCOUNTER — HOSPITAL ENCOUNTER (INPATIENT)
Age: 68
LOS: 5 days | Discharge: HOME HEALTH | DRG: 602 | End: 2023-03-08
Attending: STUDENT IN AN ORGANIZED HEALTH CARE EDUCATION/TRAINING PROGRAM | Admitting: FAMILY MEDICINE
Payer: MEDICARE

## 2023-03-03 ENCOUNTER — APPOINTMENT (OUTPATIENT)
Dept: CT IMAGING | Age: 68
DRG: 602 | End: 2023-03-03
Attending: STUDENT IN AN ORGANIZED HEALTH CARE EDUCATION/TRAINING PROGRAM
Payer: MEDICARE

## 2023-03-03 ENCOUNTER — APPOINTMENT (OUTPATIENT)
Dept: VASCULAR SURGERY | Age: 68
DRG: 602 | End: 2023-03-03
Attending: STUDENT IN AN ORGANIZED HEALTH CARE EDUCATION/TRAINING PROGRAM
Payer: MEDICARE

## 2023-03-03 ENCOUNTER — APPOINTMENT (OUTPATIENT)
Dept: GENERAL RADIOLOGY | Age: 68
DRG: 602 | End: 2023-03-03
Attending: STUDENT IN AN ORGANIZED HEALTH CARE EDUCATION/TRAINING PROGRAM
Payer: MEDICARE

## 2023-03-03 DIAGNOSIS — S83.511A RUPTURE OF ANTERIOR CRUCIATE LIGAMENT OF RIGHT KNEE, INITIAL ENCOUNTER: ICD-10-CM

## 2023-03-03 DIAGNOSIS — E11.69 TYPE 2 DIABETES MELLITUS WITH OTHER SPECIFIED COMPLICATION, WITH LONG-TERM CURRENT USE OF INSULIN (HCC): ICD-10-CM

## 2023-03-03 DIAGNOSIS — Z99.2 PERITONEAL DIALYSIS CATHETER IN PLACE (HCC): ICD-10-CM

## 2023-03-03 DIAGNOSIS — M79.89 RIGHT LEG SWELLING: Primary | ICD-10-CM

## 2023-03-03 DIAGNOSIS — Z79.4 TYPE 2 DIABETES MELLITUS WITH HYPERGLYCEMIA, WITH LONG-TERM CURRENT USE OF INSULIN (HCC): ICD-10-CM

## 2023-03-03 DIAGNOSIS — Z79.4 TYPE 2 DIABETES MELLITUS WITH OTHER SPECIFIED COMPLICATION, WITH LONG-TERM CURRENT USE OF INSULIN (HCC): ICD-10-CM

## 2023-03-03 DIAGNOSIS — M79.604 RIGHT LEG PAIN: ICD-10-CM

## 2023-03-03 DIAGNOSIS — E11.65 TYPE 2 DIABETES MELLITUS WITH HYPERGLYCEMIA, WITH LONG-TERM CURRENT USE OF INSULIN (HCC): ICD-10-CM

## 2023-03-03 PROBLEM — E87.6 ACUTE HYPOKALEMIA: Status: ACTIVE | Noted: 2023-03-03

## 2023-03-03 LAB
ALBUMIN SERPL-MCNC: 1.6 G/DL (ref 3.5–5)
ALBUMIN SERPL-MCNC: 2 G/DL (ref 3.5–5)
ALBUMIN/GLOB SERPL: 0.4 (ref 1.1–2.2)
ALP SERPL-CCNC: 129 U/L (ref 45–117)
ALT SERPL-CCNC: 9 U/L (ref 12–78)
ANION GAP SERPL CALC-SCNC: 11 MMOL/L (ref 5–15)
ANION GAP SERPL CALC-SCNC: 6 MMOL/L (ref 5–15)
APPEARANCE FLD: ABNORMAL
AST SERPL-CCNC: 14 U/L (ref 15–37)
ATRIAL RATE: 112 BPM
BASOPHILS # BLD: 0.1 K/UL (ref 0–0.1)
BASOPHILS NFR BLD: 1 % (ref 0–1)
BILIRUB SERPL-MCNC: 0.3 MG/DL (ref 0.2–1)
BUN SERPL-MCNC: 30 MG/DL (ref 6–20)
BUN SERPL-MCNC: 31 MG/DL (ref 6–20)
BUN/CREAT SERPL: 5 (ref 12–20)
BUN/CREAT SERPL: 5 (ref 12–20)
CALCIUM SERPL-MCNC: 6.6 MG/DL (ref 8.5–10.1)
CALCIUM SERPL-MCNC: 7.2 MG/DL (ref 8.5–10.1)
CALCULATED P AXIS, ECG09: 72 DEGREES
CALCULATED R AXIS, ECG10: -2 DEGREES
CALCULATED T AXIS, ECG11: 64 DEGREES
CHLORIDE SERPL-SCNC: 94 MMOL/L (ref 97–108)
CHLORIDE SERPL-SCNC: 97 MMOL/L (ref 97–108)
CO2 SERPL-SCNC: 33 MMOL/L (ref 21–32)
CO2 SERPL-SCNC: 35 MMOL/L (ref 21–32)
COLOR FLD: ABNORMAL
COMMENT, HOLDF: NORMAL
CREAT SERPL-MCNC: 5.65 MG/DL (ref 0.55–1.02)
CREAT SERPL-MCNC: 5.66 MG/DL (ref 0.55–1.02)
CRP SERPL-MCNC: 11.4 MG/DL (ref 0–0.6)
DIAGNOSIS, 93000: NORMAL
DIFFERENTIAL METHOD BLD: ABNORMAL
EOSINOPHIL # BLD: 0.4 K/UL (ref 0–0.4)
EOSINOPHIL NFR BLD: 4 % (ref 0–7)
ERYTHROCYTE [DISTWIDTH] IN BLOOD BY AUTOMATED COUNT: 15.6 % (ref 11.5–14.5)
ERYTHROCYTE [SEDIMENTATION RATE] IN BLOOD: 128 MM/HR (ref 0–30)
EST. AVERAGE GLUCOSE BLD GHB EST-MCNC: 137 MG/DL
GLOBULIN SER CALC-MCNC: 5.7 G/DL (ref 2–4)
GLUCOSE BLD STRIP.AUTO-MCNC: 161 MG/DL (ref 65–117)
GLUCOSE BLD STRIP.AUTO-MCNC: 322 MG/DL (ref 65–117)
GLUCOSE SERPL-MCNC: 145 MG/DL (ref 65–100)
GLUCOSE SERPL-MCNC: 150 MG/DL (ref 65–100)
HBA1C MFR BLD: 6.4 % (ref 4–5.6)
HBV SURFACE AB SER QL: NONREACTIVE
HBV SURFACE AB SER-ACNC: <3.1 MIU/ML
HBV SURFACE AG SER QL: <0.1 INDEX
HBV SURFACE AG SER QL: NEGATIVE
HCT VFR BLD AUTO: 27 % (ref 35–47)
HGB BLD-MCNC: 8.3 G/DL (ref 11.5–16)
IMM GRANULOCYTES # BLD AUTO: 0.1 K/UL (ref 0–0.04)
IMM GRANULOCYTES NFR BLD AUTO: 1 % (ref 0–0.5)
IRON SATN MFR SERPL: 16 % (ref 20–50)
IRON SERPL-MCNC: 20 UG/DL (ref 35–150)
LACTATE SERPL-SCNC: 2 MMOL/L (ref 0.4–2)
LYMPHOCYTES # BLD: 1.8 K/UL (ref 0.8–3.5)
LYMPHOCYTES NFR BLD: 17 % (ref 12–49)
LYMPHOCYTES NFR FLD: 16 %
MAGNESIUM SERPL-MCNC: 2 MG/DL (ref 1.6–2.4)
MCH RBC QN AUTO: 30.5 PG (ref 26–34)
MCHC RBC AUTO-ENTMCNC: 30.7 G/DL (ref 30–36.5)
MCV RBC AUTO: 99.3 FL (ref 80–99)
MONOCYTES # BLD: 1 K/UL (ref 0–1)
MONOCYTES NFR BLD: 9 % (ref 5–13)
MONOS+MACROS NFR FLD: 24 %
NEUTROPHILS NFR FLD: 60 %
NEUTS SEG # BLD: 7.1 K/UL (ref 1.8–8)
NEUTS SEG NFR BLD: 68 % (ref 32–75)
NRBC # BLD: 0 K/UL (ref 0–0.01)
NRBC BLD-RTO: 0 PER 100 WBC
NUC CELL # FLD: 1380 /CU MM
P-R INTERVAL, ECG05: 150 MS
PHOSPHATE SERPL-MCNC: 4.4 MG/DL (ref 2.6–4.7)
PLATELET # BLD AUTO: 409 K/UL (ref 150–400)
PMV BLD AUTO: 9.2 FL (ref 8.9–12.9)
POTASSIUM SERPL-SCNC: 2.6 MMOL/L (ref 3.5–5.1)
POTASSIUM SERPL-SCNC: 3.4 MMOL/L (ref 3.5–5.1)
PROT SERPL-MCNC: 7.7 G/DL (ref 6.4–8.2)
Q-T INTERVAL, ECG07: 374 MS
QRS DURATION, ECG06: 94 MS
QTC CALCULATION (BEZET), ECG08: 510 MS
RBC # BLD AUTO: 2.72 M/UL (ref 3.8–5.2)
RBC # FLD: >100 /CU MM
SAMPLES BEING HELD,HOLD: NORMAL
SERVICE CMNT-IMP: ABNORMAL
SERVICE CMNT-IMP: ABNORMAL
SODIUM SERPL-SCNC: 138 MMOL/L (ref 136–145)
SODIUM SERPL-SCNC: 138 MMOL/L (ref 136–145)
SPECIMEN SOURCE FLD: ABNORMAL
TIBC SERPL-MCNC: 123 UG/DL (ref 250–450)
VENTRICULAR RATE, ECG03: 112 BPM
WBC # BLD AUTO: 10.3 K/UL (ref 3.6–11)

## 2023-03-03 PROCEDURE — 97535 SELF CARE MNGMENT TRAINING: CPT

## 2023-03-03 PROCEDURE — 94640 AIRWAY INHALATION TREATMENT: CPT

## 2023-03-03 PROCEDURE — A4726 DIALYS SOL FLD VOL > 5999CC: HCPCS | Performed by: INTERNAL MEDICINE

## 2023-03-03 PROCEDURE — 73700 CT LOWER EXTREMITY W/O DYE: CPT

## 2023-03-03 PROCEDURE — 74011250637 HC RX REV CODE- 250/637: Performed by: STUDENT IN AN ORGANIZED HEALTH CARE EDUCATION/TRAINING PROGRAM

## 2023-03-03 PROCEDURE — 36415 COLL VENOUS BLD VENIPUNCTURE: CPT

## 2023-03-03 PROCEDURE — 87077 CULTURE AEROBIC IDENTIFY: CPT

## 2023-03-03 PROCEDURE — 74011636637 HC RX REV CODE- 636/637: Performed by: NURSE PRACTITIONER

## 2023-03-03 PROCEDURE — 85025 COMPLETE CBC W/AUTO DIFF WBC: CPT

## 2023-03-03 PROCEDURE — 83540 ASSAY OF IRON: CPT

## 2023-03-03 PROCEDURE — 97165 OT EVAL LOW COMPLEX 30 MIN: CPT

## 2023-03-03 PROCEDURE — 99285 EMERGENCY DEPT VISIT HI MDM: CPT

## 2023-03-03 PROCEDURE — 80069 RENAL FUNCTION PANEL: CPT

## 2023-03-03 PROCEDURE — 93005 ELECTROCARDIOGRAM TRACING: CPT

## 2023-03-03 PROCEDURE — 74011250637 HC RX REV CODE- 250/637: Performed by: INTERNAL MEDICINE

## 2023-03-03 PROCEDURE — 71046 X-RAY EXAM CHEST 2 VIEWS: CPT

## 2023-03-03 PROCEDURE — 74011250636 HC RX REV CODE- 250/636: Performed by: STUDENT IN AN ORGANIZED HEALTH CARE EDUCATION/TRAINING PROGRAM

## 2023-03-03 PROCEDURE — 87040 BLOOD CULTURE FOR BACTERIA: CPT

## 2023-03-03 PROCEDURE — 73562 X-RAY EXAM OF KNEE 3: CPT

## 2023-03-03 PROCEDURE — 87205 SMEAR GRAM STAIN: CPT

## 2023-03-03 PROCEDURE — 83735 ASSAY OF MAGNESIUM: CPT

## 2023-03-03 PROCEDURE — 86140 C-REACTIVE PROTEIN: CPT

## 2023-03-03 PROCEDURE — 83605 ASSAY OF LACTIC ACID: CPT

## 2023-03-03 PROCEDURE — 93971 EXTREMITY STUDY: CPT

## 2023-03-03 PROCEDURE — 74011250636 HC RX REV CODE- 250/636: Performed by: NURSE PRACTITIONER

## 2023-03-03 PROCEDURE — 80053 COMPREHEN METABOLIC PANEL: CPT

## 2023-03-03 PROCEDURE — 74011250637 HC RX REV CODE- 250/637: Performed by: NURSE PRACTITIONER

## 2023-03-03 PROCEDURE — 65270000046 HC RM TELEMETRY

## 2023-03-03 PROCEDURE — 74011000250 HC RX REV CODE- 250: Performed by: STUDENT IN AN ORGANIZED HEALTH CARE EDUCATION/TRAINING PROGRAM

## 2023-03-03 PROCEDURE — 99221 1ST HOSP IP/OBS SF/LOW 40: CPT | Performed by: CLINICAL NURSE SPECIALIST

## 2023-03-03 PROCEDURE — 74011250636 HC RX REV CODE- 250/636: Performed by: INTERNAL MEDICINE

## 2023-03-03 PROCEDURE — 90945 DIALYSIS ONE EVALUATION: CPT

## 2023-03-03 PROCEDURE — 82962 GLUCOSE BLOOD TEST: CPT

## 2023-03-03 PROCEDURE — 97161 PT EVAL LOW COMPLEX 20 MIN: CPT

## 2023-03-03 PROCEDURE — 74011636637 HC RX REV CODE- 636/637: Performed by: CLINICAL NURSE SPECIALIST

## 2023-03-03 PROCEDURE — 70450 CT HEAD/BRAIN W/O DYE: CPT

## 2023-03-03 PROCEDURE — 89050 BODY FLUID CELL COUNT: CPT

## 2023-03-03 PROCEDURE — 97116 GAIT TRAINING THERAPY: CPT

## 2023-03-03 PROCEDURE — 87340 HEPATITIS B SURFACE AG IA: CPT

## 2023-03-03 PROCEDURE — 83036 HEMOGLOBIN GLYCOSYLATED A1C: CPT

## 2023-03-03 PROCEDURE — 74011000250 HC RX REV CODE- 250: Performed by: NURSE PRACTITIONER

## 2023-03-03 PROCEDURE — 85652 RBC SED RATE AUTOMATED: CPT

## 2023-03-03 PROCEDURE — 86706 HEP B SURFACE ANTIBODY: CPT

## 2023-03-03 RX ORDER — MORPHINE SULFATE 2 MG/ML
2 INJECTION, SOLUTION INTRAMUSCULAR; INTRAVENOUS
Status: DISCONTINUED | OUTPATIENT
Start: 2023-03-03 | End: 2023-03-08 | Stop reason: HOSPADM

## 2023-03-03 RX ORDER — DICLOFENAC SODIUM 10 MG/G
2 GEL TOPICAL DAILY
Status: DISCONTINUED | OUTPATIENT
Start: 2023-03-03 | End: 2023-03-08 | Stop reason: HOSPADM

## 2023-03-03 RX ORDER — HEPARIN SODIUM 10000 [USP'U]/100ML
18-36 INJECTION, SOLUTION INTRAVENOUS
Status: DISCONTINUED | OUTPATIENT
Start: 2023-03-03 | End: 2023-03-03

## 2023-03-03 RX ORDER — LANOLIN ALCOHOL/MO/W.PET/CERES
400 CREAM (GRAM) TOPICAL DAILY
Status: DISCONTINUED | OUTPATIENT
Start: 2023-03-03 | End: 2023-03-08 | Stop reason: HOSPADM

## 2023-03-03 RX ORDER — INSULIN GLARGINE 100 [IU]/ML
20 INJECTION, SOLUTION SUBCUTANEOUS DAILY
Status: DISCONTINUED | OUTPATIENT
Start: 2023-03-04 | End: 2023-03-03

## 2023-03-03 RX ORDER — AMLODIPINE BESYLATE 5 MG/1
10 TABLET ORAL DAILY
Status: DISCONTINUED | OUTPATIENT
Start: 2023-03-03 | End: 2023-03-08 | Stop reason: HOSPADM

## 2023-03-03 RX ORDER — ALBUTEROL SULFATE 90 UG/1
2 AEROSOL, METERED RESPIRATORY (INHALATION)
COMMUNITY

## 2023-03-03 RX ORDER — POTASSIUM CHLORIDE 20 MEQ/1
20 TABLET, EXTENDED RELEASE ORAL DAILY
COMMUNITY

## 2023-03-03 RX ORDER — ONDANSETRON 4 MG/1
4 TABLET, ORALLY DISINTEGRATING ORAL
Status: DISCONTINUED | OUTPATIENT
Start: 2023-03-03 | End: 2023-03-08 | Stop reason: HOSPADM

## 2023-03-03 RX ORDER — IPRATROPIUM BROMIDE AND ALBUTEROL SULFATE 2.5; .5 MG/3ML; MG/3ML
3 SOLUTION RESPIRATORY (INHALATION)
COMMUNITY

## 2023-03-03 RX ORDER — MAGNESIUM SULFATE HEPTAHYDRATE 40 MG/ML
2 INJECTION, SOLUTION INTRAVENOUS ONCE
Status: COMPLETED | OUTPATIENT
Start: 2023-03-03 | End: 2023-03-03

## 2023-03-03 RX ORDER — FUROSEMIDE 40 MG/1
80 TABLET ORAL 2 TIMES DAILY
Status: DISCONTINUED | OUTPATIENT
Start: 2023-03-03 | End: 2023-03-08 | Stop reason: HOSPADM

## 2023-03-03 RX ORDER — INSULIN GLARGINE 100 [IU]/ML
15 INJECTION, SOLUTION SUBCUTANEOUS DAILY
Status: DISCONTINUED | OUTPATIENT
Start: 2023-03-04 | End: 2023-03-07

## 2023-03-03 RX ORDER — HEPARIN SODIUM 5000 [USP'U]/ML
5000 INJECTION, SOLUTION INTRAVENOUS; SUBCUTANEOUS EVERY 12 HOURS
Status: DISCONTINUED | OUTPATIENT
Start: 2023-03-03 | End: 2023-03-08 | Stop reason: HOSPADM

## 2023-03-03 RX ORDER — INSULIN LISPRO 100 [IU]/ML
INJECTION, SOLUTION INTRAVENOUS; SUBCUTANEOUS
Status: DISCONTINUED | OUTPATIENT
Start: 2023-03-03 | End: 2023-03-03

## 2023-03-03 RX ORDER — FUROSEMIDE 40 MG/1
40 TABLET ORAL DAILY
Status: DISCONTINUED | OUTPATIENT
Start: 2023-03-03 | End: 2023-03-03

## 2023-03-03 RX ORDER — ROSUVASTATIN CALCIUM 40 MG/1
40 TABLET, COATED ORAL DAILY
COMMUNITY

## 2023-03-03 RX ORDER — GENTAMICIN SULFATE 1 MG/G
CREAM TOPICAL
Status: DISCONTINUED | OUTPATIENT
Start: 2023-03-03 | End: 2023-03-08 | Stop reason: HOSPADM

## 2023-03-03 RX ORDER — MONTELUKAST SODIUM 10 MG/1
10 TABLET ORAL
Status: DISCONTINUED | OUTPATIENT
Start: 2023-03-03 | End: 2023-03-08 | Stop reason: HOSPADM

## 2023-03-03 RX ORDER — ACETAMINOPHEN 325 MG/1
650 TABLET ORAL
Status: DISCONTINUED | OUTPATIENT
Start: 2023-03-03 | End: 2023-03-08 | Stop reason: HOSPADM

## 2023-03-03 RX ORDER — ASPIRIN 325 MG
325 TABLET ORAL DAILY
Status: DISCONTINUED | OUTPATIENT
Start: 2023-03-03 | End: 2023-03-08 | Stop reason: HOSPADM

## 2023-03-03 RX ORDER — OXYCODONE HYDROCHLORIDE 5 MG/1
5 TABLET ORAL
Status: DISCONTINUED | OUTPATIENT
Start: 2023-03-03 | End: 2023-03-08 | Stop reason: HOSPADM

## 2023-03-03 RX ORDER — DULOXETIN HYDROCHLORIDE 20 MG/1
20 CAPSULE, DELAYED RELEASE ORAL 2 TIMES DAILY
Status: DISCONTINUED | OUTPATIENT
Start: 2023-03-03 | End: 2023-03-08 | Stop reason: HOSPADM

## 2023-03-03 RX ORDER — IBUPROFEN 200 MG
4 TABLET ORAL AS NEEDED
Status: DISCONTINUED | OUTPATIENT
Start: 2023-03-03 | End: 2023-03-08 | Stop reason: HOSPADM

## 2023-03-03 RX ORDER — ROSUVASTATIN CALCIUM 10 MG/1
10 TABLET, COATED ORAL DAILY
Status: DISCONTINUED | OUTPATIENT
Start: 2023-03-04 | End: 2023-03-08 | Stop reason: HOSPADM

## 2023-03-03 RX ORDER — INSULIN LISPRO 100 [IU]/ML
INJECTION, SOLUTION INTRAVENOUS; SUBCUTANEOUS
Status: DISCONTINUED | OUTPATIENT
Start: 2023-03-03 | End: 2023-03-08 | Stop reason: HOSPADM

## 2023-03-03 RX ORDER — HYDRALAZINE HYDROCHLORIDE 50 MG/1
50 TABLET, FILM COATED ORAL 3 TIMES DAILY
Status: DISCONTINUED | OUTPATIENT
Start: 2023-03-03 | End: 2023-03-08 | Stop reason: HOSPADM

## 2023-03-03 RX ORDER — CARVEDILOL 12.5 MG/1
25 TABLET ORAL 2 TIMES DAILY WITH MEALS
Status: DISCONTINUED | OUTPATIENT
Start: 2023-03-03 | End: 2023-03-08 | Stop reason: HOSPADM

## 2023-03-03 RX ORDER — OXYCODONE HYDROCHLORIDE 5 MG/1
5 TABLET ORAL
Status: COMPLETED | OUTPATIENT
Start: 2023-03-03 | End: 2023-03-03

## 2023-03-03 RX ORDER — VANCOMYCIN 2 GRAM/500 ML IN 0.9 % SODIUM CHLORIDE INTRAVENOUS
2 ONCE
Status: COMPLETED | OUTPATIENT
Start: 2023-03-03 | End: 2023-03-03

## 2023-03-03 RX ORDER — GENTAMICIN SULFATE 1 MG/G
CREAM TOPICAL DAILY
Status: DISCONTINUED | OUTPATIENT
Start: 2023-03-03 | End: 2023-03-03

## 2023-03-03 RX ORDER — IPRATROPIUM BROMIDE AND ALBUTEROL SULFATE 2.5; .5 MG/3ML; MG/3ML
3 SOLUTION RESPIRATORY (INHALATION)
Status: DISCONTINUED | OUTPATIENT
Start: 2023-03-03 | End: 2023-03-08 | Stop reason: HOSPADM

## 2023-03-03 RX ADMIN — OXYCODONE HYDROCHLORIDE 5 MG: 5 TABLET ORAL at 17:39

## 2023-03-03 RX ADMIN — POTASSIUM BICARBONATE 40 MEQ: 782 TABLET, EFFERVESCENT ORAL at 10:10

## 2023-03-03 RX ADMIN — HYDRALAZINE HYDROCHLORIDE 50 MG: 50 TABLET, FILM COATED ORAL at 10:10

## 2023-03-03 RX ADMIN — DICLOFENAC 2 G: 10 GEL TOPICAL at 20:34

## 2023-03-03 RX ADMIN — FUROSEMIDE 40 MG: 40 TABLET ORAL at 10:10

## 2023-03-03 RX ADMIN — POTASSIUM BICARBONATE 40 MEQ: 782 TABLET, EFFERVESCENT ORAL at 08:16

## 2023-03-03 RX ADMIN — CARVEDILOL 25 MG: 12.5 TABLET, FILM COATED ORAL at 12:30

## 2023-03-03 RX ADMIN — VANCOMYCIN HYDROCHLORIDE 2000 MG: 10 INJECTION, POWDER, LYOPHILIZED, FOR SOLUTION INTRAVENOUS at 08:09

## 2023-03-03 RX ADMIN — Medication 2 UNITS: at 23:38

## 2023-03-03 RX ADMIN — MORPHINE SULFATE 2 MG: 2 INJECTION, SOLUTION INTRAMUSCULAR; INTRAVENOUS at 20:55

## 2023-03-03 RX ADMIN — OXYCODONE HYDROCHLORIDE 5 MG: 5 TABLET ORAL at 08:48

## 2023-03-03 RX ADMIN — AMLODIPINE BESYLATE 10 MG: 5 TABLET ORAL at 10:10

## 2023-03-03 RX ADMIN — HYDRALAZINE HYDROCHLORIDE 50 MG: 50 TABLET, FILM COATED ORAL at 23:39

## 2023-03-03 RX ADMIN — MORPHINE SULFATE 2 MG: 2 INJECTION, SOLUTION INTRAMUSCULAR; INTRAVENOUS at 08:48

## 2023-03-03 RX ADMIN — POTASSIUM BICARBONATE 40 MEQ: 782 TABLET, EFFERVESCENT ORAL at 06:56

## 2023-03-03 RX ADMIN — HEPARIN SODIUM 5000 UNITS: 5000 INJECTION INTRAVENOUS; SUBCUTANEOUS at 13:11

## 2023-03-03 RX ADMIN — GENTAMICIN SULFATE: 1 CREAM TOPICAL at 17:19

## 2023-03-03 RX ADMIN — OXYCODONE HYDROCHLORIDE 5 MG: 5 TABLET ORAL at 04:07

## 2023-03-03 RX ADMIN — CEFEPIME 2 G: 2 INJECTION, POWDER, FOR SOLUTION INTRAVENOUS at 06:58

## 2023-03-03 RX ADMIN — MAGNESIUM OXIDE 400 MG (241.3 MG MAGNESIUM) TABLET 400 MG: TABLET at 08:16

## 2023-03-03 RX ADMIN — SODIUM CHLORIDE, SODIUM LACTATE, CALCIUM CHLORIDE, MAGNESIUM CHLORIDE AND DEXTROSE 6000 ML: 2.5; 538; 448; 18.3; 5.08 INJECTION, SOLUTION INTRAPERITONEAL at 17:20

## 2023-03-03 RX ADMIN — CARVEDILOL 25 MG: 12.5 TABLET, FILM COATED ORAL at 20:34

## 2023-03-03 RX ADMIN — Medication 2 UNITS: at 15:49

## 2023-03-03 RX ADMIN — MORPHINE SULFATE 2 MG: 2 INJECTION, SOLUTION INTRAMUSCULAR; INTRAVENOUS at 12:30

## 2023-03-03 RX ADMIN — DULOXETINE HYDROCHLORIDE 20 MG: 20 CAPSULE, DELAYED RELEASE ORAL at 17:29

## 2023-03-03 RX ADMIN — ARFORMOTEROL TARTRATE: 15 SOLUTION RESPIRATORY (INHALATION) at 19:32

## 2023-03-03 RX ADMIN — DULOXETINE HYDROCHLORIDE 20 MG: 20 CAPSULE, DELAYED RELEASE ORAL at 10:10

## 2023-03-03 RX ADMIN — ACETAMINOPHEN 650 MG: 325 TABLET ORAL at 10:13

## 2023-03-03 RX ADMIN — ARFORMOTEROL TARTRATE: 15 SOLUTION RESPIRATORY (INHALATION) at 10:45

## 2023-03-03 RX ADMIN — SODIUM CHLORIDE, SODIUM LACTATE, CALCIUM CHLORIDE, MAGNESIUM CHLORIDE AND DEXTROSE 6000 ML: 4.25; 538; 448; 18.3; 5.08 INJECTION, SOLUTION INTRAPERITONEAL at 17:20

## 2023-03-03 RX ADMIN — FUROSEMIDE 80 MG: 40 TABLET ORAL at 17:29

## 2023-03-03 RX ADMIN — ASPIRIN 325 MG ORAL TABLET 325 MG: 325 PILL ORAL at 13:11

## 2023-03-03 RX ADMIN — MAGNESIUM SULFATE HEPTAHYDRATE 2 G: 40 INJECTION, SOLUTION INTRAVENOUS at 07:26

## 2023-03-03 NOTE — PROGRESS NOTES
Problem: Self Care Deficits Care Plan (Adult)  Goal: *Acute Goals and Plan of Care (Insert Text)  Description: FUNCTIONAL STATUS PRIOR TO ADMISSION: Patient was modified independent using a single point cane and rollator PRN for functional mobility. Patient reports she occasionally does not use AD for brief mobility in the home. HOME SUPPORT: The patient lived with son but did not require assist. Son works full time. Occupational Therapy Goals  Initiated 3/3/2023  1. Patient will perform lower body dressing with independence within 7 day(s). 2.  Patient will perform upper body dressing with independence within 7 day(s). 3.  Patient will perform grooming with independence within 7 day(s). 4.  Patient will perform toilet transfers with independence within 7 day(s). 5.  Patient will perform all aspects of toileting with independence within 7 day(s). 6.  Patient will participate in upper extremity therapeutic exercise/activities with independence for 5 minutes within 7 day(s). 7.  Patient will utilize energy conservation techniques during functional activities with verbal cues within 7 day(s). Outcome: Progressing Towards Goal   OCCUPATIONAL THERAPY EVALUATION  Patient: Anuradha Ross (61 y.o. female)  Date: 3/3/2023  Primary Diagnosis: Right leg swelling [M79.89]  Right leg pain [M79.604]  Peritoneal dialysis catheter in place [facility]) [Z99.2]  Acute hypokalemia [E87.6]       Precautions:   Fall    ASSESSMENT  Based on the objective data described below, the patient presents with decreased endurance, impaired balance, generalized weakness, RLE pain, and deconditioning s/p GLF resulting in admission to ED for R knee pain. Per CT \"suspected large hematoma anterior to the patella and patellar tendon\". On this date patient limited by increased RLE pain, requiring increased time for bed mobility and brief bedroom mobility at RW level. Patient reports prior to fall at home having vertigo-like dizziness. During session patient was mildly orthostatic, however denies light headedness/dizziness. One observed LOB with bedroom mobility requiring therapist intervention, returned to sitting EOB. Patient requiring up to max A to manage LB dressing tasks 2/2 elevated pain levels, min A to don clean hospital gown. Patient requiring max A to transition to supine in bed, all needs met and vitals stable at conclusion of session. RN notified. Recommend home with [address] [place] [address] [place] at [facility] pending further pain mgmt, if family unable to manage level of assist required patient will require IPR. Vitals:     03/03/23 1411 03/03/23 1418 03/03/23 1427 03/03/23 1428   BP: 121/68 114/71 (!) 97/59 (!) 100/57   BP 1 Location: Right upper arm Right upper arm Right upper arm Right upper arm   BP Patient Position: Supine Sitting Standing Sitting  Comment: following activity   Pulse: 96 (!) 101 100 (!) 102   Temp:           Resp:           Weight:           SpO2: 96% 97% 99% 94%              Current Level of Function Impacting Discharge (ADLs/self-care): Functional Outcome Measure: The patient scored 15/24 on the [facility] outcome measure which is indicative of ADL deficits. Other factors to consider for discharge: Hx falls, HD/PD     Patient will benefit from skilled therapy intervention to address the above noted impairments. PLAN :  Recommendations and Planned Interventions: self care training, functional mobility training, therapeutic exercise, balance training, therapeutic activities, endurance activities, patient education, home safety training, and family training/education    Frequency/Duration: Patient will be followed by occupational therapy 5 times a week to address goals.     Recommendation for discharge: (in order for the patient to meet his/her long term goals)  To be determined: HHOT vs IPR pending pain mgmt and progress     This discharge recommendation:  Has been made in collaboration with the attending provider and/or case management    IF patient discharges home will need the following DME: bedside commode and shower chair       SUBJECTIVE:   Patient stated Can I just sit here for a minute.     OBJECTIVE DATA SUMMARY:   HISTORY:   Past Medical History:   Diagnosis Date    Asthma     CAD (coronary artery disease)     CHF (congestive heart failure) ([facility])      ?    Chronic kidney disease     HD -- Rad[facility] Menon [phone]    COPD (chronic obstructive pulmonary disease) ([facility] [address]     PCP manages    Diabetes ([facility] [address]     DM2    GERD (gastroesophageal reflux disease)     Hypertension      Past Surgical History:   Procedure Laterality Date    COLONOSCOPY N/A 2023    COLONOSCOPY performed by Starla Begum MD at [facility] ENDOSCOPY    DELIVERY       HX CORONARY STENT PLACEMENT      multiple pt unsure of how many    HX HYSTERECTOMY      IR INSERT NON TUNL CVC OVER 5 YRS  2021    IR INSERT TUNL CVC W/O PORT OVER 5 YR  2021    peritoneal dialysis    AK UNLISTED PROCEDURE CARDIAC SURGERY      AK UNLISTED PROCEDURE VASCULAR SURGERY      leg stent       Expanded or extensive additional review of patient history:     Home Situation  Home Environment: Private residence  # Steps to Enter: 3  Rails to Enter: Yes  Hand Rails : Bilateral  One/Two Story Residence: One story  Living Alone: No  Support Systems: Child(mansoor) (son (works FT))  Current DME Used/Available at Home: [place], straight, Deryl Curd, rollator  Tub or Shower Type: Tub/Shower combination      EXAMINATION OF PERFORMANCE DEFICITS:  Cognitive/Behavioral Status:  Neurologic State: Alert  Orientation Level: Oriented X4  Cognition: Appropriate decision making  Perception: Appears intact  Perseveration: No perseveration noted  Safety/Judgement: Awareness of environment    Skin/edema: \"Patient has swelling, tenderness and erythema of the entire right leg. There is some bruising on the distal portion of the anterior aspect of the knee\"      Hearing:   Auditory  Auditory Impairment: None    Vision/Perceptual:                           Acuity: Within Defined Limits         Range of Motion:    AROM: Generally decreased, functional  PROM: Generally decreased, functional                      Strength:    Strength: Generally decreased, functional                Coordination:  Coordination: Generally decreased, functional  Fine Motor Skills-Upper: Left Intact; Right Intact    Gross Motor Skills-Upper: Left Intact; Right Intact    Tone & Sensation:    Tone: Normal  Sensation: Intact                      Balance:  Sitting: Impaired  Sitting - Static: Good (unsupported)  Sitting - Dynamic: Fair (occasional)  Standing: Impaired  Standing - Static: Fair;Constant support  Standing - Dynamic : Fair;Constant support    Functional Mobility and Transfers for ADLs:  Bed Mobility:  Rolling: Minimum assistance;Assist x2  Supine to Sit: Moderate assistance;Assist x2  Sit to Supine: Maximum assistance;Assist x2  Scooting: Minimum assistance    Transfers:  Sit to Stand: Minimum assistance  Stand to Sit: Minimum assistance  Assistive Device : Walker, rolling    ADL Assessment:  Feeding: Independent    Oral Facial Hygiene/Grooming: Independent    Bathing: Maximum assistance         Upper Body Dressing: Minimum assistance    Lower Body Dressing: Maximum assistance    Toileting: Maximum assistance                  ADL Intervention and task modifications:                           Upper Body [address] Gown: Minimum  assistance  Pullover Shirt: Minimum assistance (to doff)    Lower Body Dressing Assistance  Socks: Maximum assistance         Cognitive Retraining  Safety/Judgement: Awareness of environment    Functional Measure:  [employer] AM-PAC®      Daily Activity Inpatient Short Form (6-Clicks) Version 2  How much HELP from another person do you currently need. .. (If the patient hasn't done an activity recently, how much help from another person do you think they would need if they tried?) Total A Lot A Little None   1. Putting on and taking off regular lower body clothing? []   1 [x]   2 []   3 []   4   2. Bathing (including washing, rinsing, drying)? []   1 [x]   2 []   3 []   4   3. Toileting, which includes using toilet, bedpan, or urinal? []   1 [x]   2 []   3 []   4   4. Putting on and taking off regular upper body clothing? []   1 []   2 [x]   3 []   4   5. Taking care of personal grooming such as brushing teeth? []   1 []   2 []   3 [x]   4   6. Eating meals? []   1 []   2 []   3 [x]   4     Raw Score: 15/24                            Cutoff score ?191,2,3 had higher odds of discharging home with home health or need of SNF/IPR    1. Brenda Chapaing. Validity of the AM-PAC 6-Clicks Inpatient Daily Activity and Basic Mobility Short Forms. Physical Therapy Mar 2014, [phone]) [phone]; DOI: 10.2522/ptj.[id number]  2. Ashley Pandey. Association of AM-PAC \"6-Clicks\" Basic Mobility and Daily Activity Scores With Discharge Destination. Phys Ther. 2021 Apr 4;101(4):epqi244. doi: 10.1093/ptj/wpcz310. PMID: 26602442. V Opal Gruber, Linda D, Festus Carcamo, Shannon K, Mark S. Activity Measure for Post-Acute Care \"6-Clicks\" Basic Mobility Scores Predict Discharge Destination After Acute Care Hospitalization in Select Patient Groups: A Retrospective, Observational Study. Arch Rehabil Res Clin Transl. 2022 Jul 16;4(3):334025. doi: 10.1016/j.arrct. 4975.293240. PMID: 23676850; PMCID: [id number]. 4. López Motley Ni P. AM-PAC Short Forms Manual 4.0. Revised 2/2020.       Occupational Therapy Evaluation Charge Determination   History Examination Decision-Making   LOW Complexity : Brief history review  LOW Complexity : 1-3 performance deficits relating to physical, cognitive , or psychosocial skils that result in activity limitations and / or participation restrictions LOW Complexity : No comorbidities that affect functional and no verbal or physical assistance needed to complete eval tasks       Based on the above components, the patient evaluation is determined to be of the following complexity level: LOW   Pain Rating:  Pt did endorse 7-8/10 pain in RLE. Activity Tolerance:   Fair and requires rest breaks    After treatment patient left in no apparent distress:    Supine in bed, Heels elevated for pressure relief, and Call bell within reach    COMMUNICATION/EDUCATION:   The patients plan of care was discussed with: Physical therapist, Occupational therapist, and Registered nurse. Patient/family have participated as able in goal setting and plan of care. This patients plan of care is appropriate for delegation to [facility].     Thank you for this referral.  Pearl Rae, OT  Time Calculation: 28 mins

## 2023-03-03 NOTE — ED TRIAGE NOTES
She checks in for a fall on Tuesday. She is having pain in her left knee and her back on the right side \"in the middle. \" She states she was feeling dizzy at that time and fell. Denies LOC. She reports she did hit her head. Denies taking thinners. A&OX4.

## 2023-03-03 NOTE — H&P
History and Physical    Date of Service:  3/3/2023  Primary Care Provider: Diego Esquivel MD  Source of information: The patient and Chart review    Chief Complaint: Fall      History of Presenting Illness:   Noel Smith is a 75 y.o. female who presents with medical hx of recent fall, hypertension, type II dm, CAD, s/p PCI, CHF, CKD on PD exchange, COPD, GERD was admitted to the hospital on 3/3 with RLE pain and swelling. Pt fell on 3/1, hit her head but no LOC. Pt was able get back up after the fall, was able to move around initially. Pt did not seek for medical after immediately after the fall. Pt decided to seek for medical help when her RLE pain continue to get worse. Afebrile, profoundly hypertensive, RA O2 sat was 99% ib admission, supplement O2 provided at 2L when her O2 sat decreased to 93%. WBC 10.3, CXR revealed The lungs are adequately expanded. Pulmonary vascular congestion. Right knee XR revealed no fracture. Head CT negative for acute intracranial process. Duplex study of low extremity revealed no DVT. CT of RLE revealed Diffuse soft tissue swelling of the right lower extremity. Suspected large hematoma anterior to the patella and patellar tendon. Large predominantly fat density lesion in the right iliopsoas compartment  which is increased in size since 6/8/2011. This has no definite concerning  features on noncontrast CT and may simply reflect a lipoma. The patient denies any headache, blurry vision, sore throat, trouble swallowing, trouble with speech, chest pain, SOB, cough, fever, chills, N/V/D, abd pain, urinary symptoms, constipation, recent travels, sick contacts, focal or generalized neurological symptoms, falls, injuries, rashes, contact with COVID-19 diagnosed patients, hematemesis, melena, hemoptysis, hematuria, rashes, denies starting any new medications and denies any other concerns or problems besides as mentioned above.          REVIEW OF SYSTEMS:  A comprehensive review of systems was negative except for that written in the History of Present Illness. Past Medical History:   Diagnosis Date    Asthma     CAD (coronary artery disease)     CHF (congestive heart failure) ([facility])      ?    Chronic kidney disease     HD -- [facility]on [phone]    COPD (chronic obstructive pulmonary disease) ([facility] [address]     PCP manages    Diabetes ([facility] [address]     DM2    GERD (gastroesophageal reflux disease)     Hypertension       Past Surgical History:   Procedure Laterality Date    COLONOSCOPY N/A 2023    COLONOSCOPY performed by Sathya Urban MD at [facility] ENDOSCOPY    DELIVERY       HX CORONARY STENT PLACEMENT      multiple pt unsure of how many    HX HYSTERECTOMY      IR INSERT NON TUNL CVC OVER 5 YRS  2021    IR INSERT TUNL CVC W/O PORT OVER 5 YR  2021    peritoneal dialysis    TX UNLISTED PROCEDURE CARDIAC SURGERY      TX UNLISTED PROCEDURE VASCULAR SURGERY      leg stent     Prior to Admission medications    Medication Sig Start Date End Date Taking? Authorizing Provider   amLODIPine (NORVASC) 10 mg tablet Take 10 mg by mouth daily. 22   Provider, Historical   diclofenac (VOLTAREN) 1 % gel daily. 22   Provider, Historical   DULoxetine (CYMBALTA) 20 mg capsule Take 20 mg by mouth two (2) times a day. 22   Provider, Historical   furosemide (LASIX) 40 mg tablet Take 40 mg by mouth daily. 22   Provider, Historical   lactulose 10 gram/15 mL (15 mL) soln Take 30 mL by mouth two (2) times daily as needed for PRN Reason (Other) (Constipation). 22   Provider, Historical   carvediloL (COREG) 25 mg tablet Take 25 mg by mouth two (2) times daily (with meals). Provider, Historical   dulaglutide (Trulicity) 0.39 QD/0.1 mL sub-q pen 0.75 mg by SubCUTAneous route every seven (7) days. Provider, Historical   insulin glargine (LANTUS,BASAGLAR) 100 unit/mL (3 mL) inpn 20 Units by SubCUTAneous route daily.     Provider, Historical   rosuvastatin (CRESTOR) 20 mg tablet Take 0.5 Tablets by mouth nightly. Take 1 Tab by mouth nightly. Dose reduced from 20 mg to 10 mg. Patient taking differently: Take 10 mg by mouth nightly. Take 1 Tab by mouth nightly. Dose reduced from 20 mg to 10 mg. 12/10/21   Kurt Queen MD   epoetin joni-epbx (RETACRIT) 20,000 unit/mL injection 1 mL by SubCUTAneous route every Monday, Wednesday, Friday. Indications: anemia due to kidney failure  Indications: anemia due to kidney failure 12/10/21   Kurt Queen MD   hydrALAZINE (APRESOLINE) 50 mg tablet Take 1 Tablet by mouth three (3) times daily. 12/10/21   Kurt Queen MD   mometasone-formoterol (DULERA) 200-5 mcg/actuation HFA inhaler Take 2 Puffs by inhalation two (2) times a day. Provider, Historical   insulin NPH/insulin regular (NOVOLIN 70/30 U-100 INSULIN) 100 unit/mL (70-30) injection 45 Units by SubCUTAneous route two (2) times a day. 7/9/18   Tami Luciano MD   albuterol-ipratropium (DUO-NEB) 2.5 mg-0.5 mg/3 ml nebu 3 mL by Nebulization route every six (6) hours as needed. 3/19/18   Tami Luciano MD   montelukast (SINGULAIR) 10 mg tablet Take 1 Tab by mouth nightly. 3/19/18   Tami Luciano MD   aspirin (ASPIRIN) 325 mg tablet Take 325 mg by mouth daily. Other, MD Tony     Allergies   Allergen Reactions    Ivp Dye [Fd And C Blue No.1] Hives      Family History   Problem Relation Age of Onset    Heart Disease Mother     Cancer Father       Social History:  reports that she has quit smoking. Her smoking use included cigarettes. She has a 2.50 pack-year smoking history. She has never used smokeless tobacco. She reports that she does not drink alcohol and does not use drugs.    Social Determinants of Health     Tobacco Use: Medium Risk    Smoking Tobacco Use: Former    Smokeless Tobacco Use: Never    Passive Exposure: Not on file   Alcohol Use: Not on file   Financial Resource Strain: Not on file   Food Insecurity: Not on file   Transportation Needs: Not on file   Physical Activity: Not on file   Stress: Not on file   Social Connections: Not on file   Intimate Partner Violence: Not on file   Depression: Not on file   Housing Stability: Not on file        Medications were reconciled to the best of my ability given all available resources at the time of admission. Route is PO if not otherwise noted. Family and social history were personally reviewed, all pertinent and relevant details are outlined as above. Objective:   Visit Vitals  BP (!) 167/75   Pulse (!) 110   Temp 98.6 °F (37 °C)   Resp 20   SpO2 94%      O2 Device: None (Room air)    PHYSICAL EXAM:   General: Alert x oriented x 3, awake, no acute distress,   HEENT: PEERL, EOMI, moist mucus membranes  Neck: Supple, no JVD, no meningeal signs  Chest: Clear in apex with decreased breath sounds at bases  CVS: RRR, S1 S2 heard, no murmurs/rubs/gallops  Abd: Soft, non-tender, non-distended, +bowel sounds, PD catheter site intact  Ext: No clubbing, no cyanosis, + edema  Neuro/Psych: Pleasant mood and affect, CN 2-12 grossly intact, sensory grossly within normal limit, unable to bend RLE due to pain. Normal strength in both upper extremity and LLE. Cap refill: Brisk, less than 3 seconds  Pulses: 2+, symmetric in all extremities  Skin: Warm, dry, without rashes or lesions    Data Review:   I have independently reviewed and interpreted patient's lab and all other diagnostic data    Abnormal Labs Reviewed   CBC WITH AUTOMATED DIFF - Abnormal; Notable for the following components:       Result Value    RBC 2.72 (*)     HGB 8.3 (*)     HCT 27.0 (*)     MCV 99.3 (*)     RDW 15.6 (*)     PLATELET 939 (*)     IMMATURE GRANULOCYTES 1 (*)     ABS. IMM.  GRANS. 0.1 (*)     All other components within normal limits   METABOLIC PANEL, COMPREHENSIVE - Abnormal; Notable for the following components:    Potassium 2.6 (*)     Chloride 94 (*)     CO2 33 (*)     Glucose 145 (*)     BUN 30 (*)     Creatinine 5.66 (*) BUN/Creatinine ratio 5 (*)     eGFR 8 (*)     Calcium 7.2 (*)     ALT (SGPT) 9 (*)     AST (SGOT) 14 (*)     Alk. phosphatase 129 (*)     Albumin 2.0 (*)     Globulin 5.7 (*)     A-G Ratio 0.4 (*)     All other components within normal limits   SED RATE (ESR) - Abnormal; Notable for the following components:    Sed rate, automated 128 (*)     All other components within normal limits   C REACTIVE PROTEIN, QT - Abnormal; Notable for the following components:    C-Reactive protein 11.40 (*)     All other components within normal limits       All Micro Results       Procedure Component Value Units Date/Time    CULTURE, BODY FLUID [[id number]]     Order Status: Sent Specimen: Body Fluid from Peritoneal Dialy Fld     CULTURE, BLOOD, PAIRED [[id number]] Collected: 03/03/23 0532    Order Status: Sent Specimen: Blood Updated: 03/03/23 0553            IMAGING:   CT HEAD WO CONT   Final Result      1. No acute traumatic injury. 2. Enlarged bilateral intraparotid nodules 18 mm partly visualized, consider   nonemergent CT neck. XR KNEE RT 3 V   Final Result   No acute fracture. XR CHEST PA LAT   Final Result   Pulmonary vascular congestion.           DUPLEX LOWER EXT VENOUS RIGHT    (Results Pending)        ECG/ECHO:    Results for orders placed or performed during the hospital encounter of 10/19/22   EKG, 12 LEAD, INITIAL   Result Value Ref Range    Ventricular Rate 99 BPM    Atrial Rate 99 BPM    P-R Interval 148 ms    QRS Duration 86 ms    Q-T Interval 418 ms    QTC Calculation (Bezet) 536 ms    Calculated P Axis 70 degrees    Calculated R Axis 35 degrees    Calculated T Axis 120 degrees    Diagnosis       Normal sinus rhythm  Nonspecific ST and T wave abnormality  Prolonged QT  Abnormal ECG  When compared with ECG of 15-MAY-2022 04:03,  QRS axis shifted right  Nonspecific T wave abnormality now evident in Inferior leads  Nonspecific T wave abnormality now evident in Lateral leads  Confirmed by Hi Lujan MD ([id number]) on 10/19/2022 9:00:31 PM            Notes reviewed from all clinical/nonclinical/nursing services involved in patient's clinical care. Care coordination discussions were held with appropriate clinical/nonclinical/ nursing providers based on care coordination needs. Assessment:   Given the patient's current clinical presentation, there is a high level of concern for decompensation if discharged from the emergency department. Complex decision making was performed, which includes reviewing the patient's available past medical records, laboratory results, and imaging studies. Active Problems:    Acute hypokalemia (3/3/2023)      Right leg pain (3/3/2023)      Peritoneal dialysis catheter in place [facility]) (3/3/2023)      Right leg swelling (3/3/2023)        Plan:   RLE pain  Cellulitis vs compartment syndrome  S/p fall  - afebrile, wbc normal    BC (3/3) pending    Duplex study of low extremity; no DVT    CT of RLE; Diffuse soft tissue swelling of the right lower extremity. Suspected large hematoma anterior to the patella and patellar tendon. Large predominantly fat density lesion in the right iliopsoas compartment which is increased in size since 6/8/2011.  This has no definite concerning features on noncontrast CT and may simply reflect a lipoma    Orthopedic team has been consulted      Continue with empiric abx therapy with IV cefepime and vancomycin    ESRD-PD  - nephrology following;    PD fluid cell count and gram stain, and cx pending    Volume overloaded  - continue with UF with PD    Continue with lasix    Hypertension  - continue with hydralazine, coreg    Type II DM  - A1C 6.4    Continue with insulin therapy    Diabetic educator consult in place    Depression  - continue with cympbalta    COPD  - not in exacerbation    On Dulera at home; continue with arformeterol/budesonide neb tmt here    Continue with Singulair    Above plan d/w pt, pt's nurse, ortho team, and Dr. Romeo Campbell  DIET: No diet orders on file   ISOLATION PRECAUTIONS: There are currently no Active Isolations  CODE STATUS: Prior   DVT PROPHYLAXIS: Heparin  FUNCTIONAL STATUS PRIOR TO HOSPITALIZATION: Fully active and ambulatory; able to carry on all self-care without restriction. Ambulatory status/function: Ambulates with assistance: to be determine after eval by PT/OT   EARLY MOBILITY ASSESSMENT: Recommend routine ambulation while hospitalized with the assistance of nursing staff  ANTICIPATED DISCHARGE: 24-48 hours. ANTICIPATED DISPOSITION: Home with Home Healthcare  EMERGENCY CONTACT/SURROGATE DECISION MAKER:   Daughter Veronica Esquivel #[phone]   CRITICAL CARE WAS PERFORMED FOR THIS ENCOUNTER: YES. I had a face to face encounter with the patient, reviewed and interpreted patient data including clinical events, labs, images, vital signs, I/O's, and examined patient. I have discussed the case and the plan and management of the patient's care with the consulting services, the bedside nurses and necessary ancillary providers. This patient has a high probability of imminent, clinically significant deterioration, which requires the highest level of preparedness to intervene urgently. I participated in the decision-making and personally managed or directed the management of the following life and organ supporting interventions that required my frequent assessment to treat or prevent imminent deterioration. I personally spent 45 minutes of critical care time. This is time spent at this critically ill patient's bedside actively involved in patient care as well as the coordination of care and discussions with the patient's family. This does not include any procedural time which has been billed separately.       Signed By: Joaquin Kimbrough NP     March 3, 2023

## 2023-03-03 NOTE — PROGRESS NOTES
Vitals:    03/03/23 1411 03/03/23 1418 03/03/23 1427 03/03/23 1428   BP: 121/68 114/71 (!) 97/59 (!) 100/57   BP 1 Location: Right upper arm Right upper arm Right upper arm Right upper arm   BP Patient Position: Supine Sitting Standing Sitting  Comment: following activity   Pulse: 96 (!) 101 100 (!) 102   Temp:       Resp:       Weight:       SpO2: 96% 97% 99% 94%

## 2023-03-03 NOTE — DIABETES MGMT
[facility]  DIABETES MANAGEMENT CONSULT    Consulted by Provider for advanced nursing evaluation and care for inpatient blood glucose management. Evaluation and Action Plan   Socrates Ridley is a 64yo AA female living with T2D/ ESRD-PD dependant -  PTA endorses taking once weekly Trulicity/ and basal insulin . Voices her meter broke so not been checking Bg. Endorses \"she doesn't eat much\". Admitted s/p fall PTA with subsequent right LE injury of hematoma-ortho consulted. BG data reveals 145-161. Noted once daily basal insulin ordered at PTA dose. Will reduce this dose by 20% while hosptialized per recs below / adjusted to HIGH sensitivity correction given ESRD. No diet ordered. Management Rationale Action Plan   Medication   Basal needs Using 20% lower PTA basal dose  START Lantus 15 units daily     Nutritional needs NA    Corrective insulin Using HIGH sensitivity based on ESRD CONTINUE    Additional orders          Diabetes Discharge Plan   Medication     Referral  []        Outpatient diabetes education   Additional orders            Initial Presentation   Socrates Ridley is a 75 y.o. female admitted  3/3/23  after experiencing fall on 3/1-hit head without LOC and was able to get self up. Pt did not seek for medical after immediately after the fall. Pt decided to seek for medical help when her RLE pain continue to get worse. LAB:  CXR: Pulmonary vascular congestion/ xray of right knee: without fracture  CT head: Head CT negative for acute intracranial process/ CT right LE: Diffuse soft tissue swelling of the right lower extremity. Suspected large hematoma anterior to the patella and patellar tendon. Large predominantly fat density lesion in the right iliopsoas compartment  which is increased in size since 6/8/2011. This has no definite concerning  features on noncontrast CT and may simply reflect a lipoma.     Duplex study: low extremity revealed no DVT    HX:   Past Medical History:   Diagnosis Date    Asthma     CAD (coronary artery disease)     CHF (congestive heart failure) ([facility])      ?    Chronic kidney disease     HD M-W-F [facility]on [phone]    COPD (chronic obstructive pulmonary disease) ([facility] [address]     PCP manages    Diabetes ([facility] [address]     DM2    GERD (gastroesophageal reflux disease)     Hypertension         INITIAL DX:   Right leg swelling [M79.89]  Right leg pain [M79.604]  Peritoneal dialysis catheter in place [facility]) [Z99.2]  Acute hypokalemia [E87.6]     Current Treatment     TX: empiric abx/ PD dialysis per nephrology    Hospital Course   Clinical progress has been complicated by Cellulitis vs. Compartment syndrome s/p fall TPA right LE.   3/3: ortho consulted/ blood cx pending/   Diabetes History   T2D- A1c 6.4%-improved since June 2022, A1C was 8.6%    Diabetes-related Medical History  Neurological complications  ? Microvascular disease  Awfulgztxch-WNLT-DT dialysis  Macrovascular disease  CAD  Other associated conditions     HF/ HTN/ COPD    Diabetes Medication History  Key Antihyperglycemic Medications               dulaglutide (Trulicity) 1.69 QG/3.4 mL sub-q pen (Taking) 0.75 mg by SubCUTAneous route every Monday. insulin glargine (LANTUS,BASAGLAR) 100 unit/mL (3 mL) inpn (Taking) 20 Units by SubCUTAneous route daily (with lunch). Diabetes self-management practices:   Eating pattern-\" I dont have much of an appetite these days\" -Verbalized she consumed 2 cups of cauliflower yesterday. Appetite waxes and wanes  [x] Dentition status dentures  Physical activity pattern-none/    Monitoring pattern-\"my meter broke\"      Taking medications pattern  [x] Consistent administration  [x] Affordable  Reducing risks  [] Influenza: There is no immunization history on file for this patient. [] Pneumonia:   There is no immunization history on file for this patient. [] Hepatitis:   There is no immunization history on file for this patient.   Social determinants of health impacting diabetes self-management practices   None voiced/observed  Overall evaluation:    [x] Achieving A1c target with drug therapy & self-care practices-A1C actually BELOW target for her duration of diabetes/ long term complications, ( ADA target for her, <7.5%)    Subjective   I don't eat\"     Objective   Physical exam  General Obese AA  female in no acute distress. Conversant and cooperative  Neuro  Alert, oriented   Vital Signs Visit Vitals  /68 (BP 1 Location: Left upper arm, BP Patient Position: Supine)   Pulse 96   Temp 98.5 °F (36.9 °C)   Resp 15   Wt 92.5 kg (204 lb)   SpO2 96%   BMI 33.95 kg/m²     Skin  Warm and dry. Heart   Regular rate and rhythm. No murmurs, rubs or gallops  Lungs  Clear to auscultation without rales or rhonchi  Extremities No foot wounds        Laboratory  Recent Labs     03/03/23  0532   *   AGAP 11   WBC 10.3   CREA 5.66*   AST 14*   ALT 9*       Factors impacting BG management  Factor Dose Comments   Nutrition:  Standard meals-?NPO     60 grams/meal      Pain prn    Infection NA    Other:   Kidney function  Liver function     ESRD-PD dependant        Blood glucose pattern  Lab -fasting?   Significant diabetes-related events over the past 24-72 hours      Assessment and Nursing Intervention   Nursing Diagnosis Risk for unstable blood glucose pattern   Nursing Intervention Domain 5250 Decision-making Support   Nursing Interventions Examined current inpatient diabetes/blood glucose control   Explored factors facilitating and impeding inpatient management  Explored corrective strategies with patient and responsible inpatient provider   Informed patient of rational for insulin strategy while hospitalized     Nursing Diagnosis 51246 Ineffective Health Management   Nursing Intervention Domain 5250 Decision-making Support   Nursing Interventions Identified diabetes self-management practices impeding diabetes control  Discussed diabetes survival skills related to  Healthy Plate eating plan; given handouts  Role of physical activity in improving insulin sensitivity and action  Procedure for blood glucose monitoring & options for low-cost products  Medications plan at discharge     Billing Code(s)      [x] 06548  initial hospital care - 40 minutes     Before making these care recommendations, I personally reviewed the hospitalization record, including notes, laboratory & diagnostic data and current medications, and examined the patient at the bedside (circumstances permitting) before determining care. More than fifty (50) percent of the time was spent in patient counseling and/or care coordination.   Total minutes: [facility] CUAUHTEMOC Bautista  Diabetes Clinical Nurse Specialist  [facility]  Access via [address] [place] [address]

## 2023-03-03 NOTE — DIALYSIS
Peritoneal Dialysis Initiation / [phone]     Orders   Therapy Time:  9.5 hrs   Cycles: 5   Fill Volume: 2300 ml   Last Fill Volume: 2000 ml   Total Volume: 79751 ml   Solution: 2 x 2.5%, 1 x 4.25% Dextrose Dianeal bags      Access   Type & Location: LLQ   Comments: Prior to connection, one-minute Alcavis scrub/soak performed. Skin cleaned with Exsept then Gentamicin and gauze applied, taped. Dsg change date: 03/03/23                                   Labs   HBsAg (Antigen) / date:  pending                                             HBsAb (Antibody) / date: pending   Source: Epic     Safety:   Time Out Done:   (Time) 1726   Consent obtained/signed: chronic   Education: Access care   Primary Nurse Rpt: Kel Moreno RN     Comments:   Pt orders, notes, labs, code status reviewed. Start time: 03/03/23 1730  Estimated End Time: 03/04/23 0300    Remained present during initial drain followed by initiation of first fill. Prior to departure, bed in lowest position, call bell and personal belongings within reach. Cell count and cultured collected and dropped at the lab as per MD orders. As per Dr Raiza Delarosa transfer set change not necessary at this time. Per pt she has a 1000 ml last fill at home.

## 2023-03-03 NOTE — ED NOTES
Verbal shift change report given to Blu Dominguez (oncoming nurse) by Mary Benitez RN (offgoing nurse). Report included the following information SBAR, ED Summary, MAR and Recent Results.

## 2023-03-03 NOTE — CONSULTS
ORTHO CONSULT NOTE    Date of Consultation:  March 3, 2023    HPI:  José Manuel Reeves is a 75 y.o. female who c/o R knee pain after fall on . She reports a direct impact onto her R knee, was able to get up afterward and resume daily activities, and it subsequently became more painful as the days progressed. The pain is localized to the R knee, severe with movement and palpation, sharp, achy at rest. Endorses associated swelling, and abrasion. Denies numbness, tingling in foot, focal weakness in foot, cp, sob, fever, chills, other extremity or joint pain. Current Anticoagulant Medications: Aspirin. Past Medical History:   Diagnosis Date    Asthma     CAD (coronary artery disease)     CHF (congestive heart failure) ([facility])      ?    Chronic kidney disease     HD  Radu Canadian [phone]    COPD (chronic obstructive pulmonary disease) ([facility]     PCP manages    Diabetes ([facility] [address]     DM2    GERD (gastroesophageal reflux disease)     Hypertension       Past Surgical History:   Procedure Laterality Date    COLONOSCOPY N/A 2023    COLONOSCOPY performed by Travon Morales MD at [facility] ENDOSCOPY    DELIVERY       HX CORONARY STENT PLACEMENT      multiple pt unsure of how many    HX HYSTERECTOMY      IR INSERT NON TUNL CVC OVER 5 YRS  2021    IR INSERT TUNL CVC W/O PORT OVER 5 YR  2021    peritoneal dialysis    IL UNLISTED PROCEDURE CARDIAC SURGERY      IL UNLISTED PROCEDURE VASCULAR SURGERY      leg stent      Family History   Problem Relation Age of Onset    Heart Disease Mother     Cancer Father       Social History     Tobacco Use    Smoking status: Former     Packs/day: 0.25     Years: 10.00     Pack years: 2.50     Types: Cigarettes    Smokeless tobacco: Never   Substance Use Topics    Alcohol use: No     Allergies   Allergen Reactions    Ivp Dye [Fd And C Blue No.1] Hives        Review of Systems:  Per HPI.     Objective:     Patient Vitals for the past 8 hrs:   BP Pulse Resp SpO2   23 1549 (!) 108/58 83 -- --   23 1428 (!) 100/57 (!) 102 -- 94 %   23 1427 (!) 97/59 100 -- 99 %   23 1418 114/71 (!) 101 -- 97 %   23 1411 121/68 96 -- 96 %   23 1045 -- -- -- 100 %   23 0900 (!) 171/83 (!) 109 15 91 %     Temp (24hrs), Av.6 °F (37 °C), Min:98.5 °F (36.9 °C), Max:98.6 °F (37 °C)      EXAM:   Moderate distress. Answers questions appropriately. Moves BUE spontaneously with NTTP long bones and joints. RLE with increased swelling from distal thigh through foot compared to LLE. The knee has a dried abrasion just below the patella. There is some mild erythema. The knee is ttp anteriorly and along the medial joint line. Compartments above and below are soft, and not ttp. Patient is able to SLR. AROM of knee between 0-20 degrees. Unable to test ligament stability due to pain. SILT all toes, top and bottom of foot and lateral calf. CRB brisk, foor warm, swelling inhibits ability to palpate DP. Imaging Review:   Results from Hospital Encounter encounter on 23    XR CHEST PA LAT    Narrative  INDICATION:   fall    COMPARISON: 2022    FINDINGS:    Frontal and lateral views of the chest demonstrate a normal cardiomediastinal  silhouette. The lungs are adequately expanded. Pulmonary vascular congestion. No consolidation or pneumothorax. The osseous structures are unremarkable. Impression  Pulmonary vascular congestion. Results from [place] encounter on 23    CT LOW EXT RT WO CONT    Narrative  EXAM: CT LOW EXT RT WO CONT    INDICATION: RLE pain; swelling, erythema, pain - no DVT per duplex study, s/p  fall    COMPARISON: CT abdomen and pelvis 2021, right knee radiographs 3/3/2023    TECHNIQUE: Helical CT of the right lower extremity with coronal and sagittal  reformats. Images reviewed in soft tissue and bone windows.   CT dose reduction  was achieved through the use of a standardized protocol tailored for this  examination and automatic exposure control for dose modulation. CONTRAST: None. FINDINGS: Bones:  No fracture, dislocation, or periosteal reaction. Joint fluid: Moderate knee joint effusion    Articulations: Moderate lower lumbosacral facet arthropathy. Mild bilateral  sacroiliac joint osteoarthritic. Multiphasic symphysis arthropathy. Moderate  right hip joint osteoarthritis. Mild-to-moderate tricompartmental right knee  osteoarthritis, most pronounced laterally. Mild to moderate osteophytes  involving the visualized foot and ankle. Tendons: No full-thickness tendon tear. Muscles: No intramuscular hematoma. No focal atrophy. Partially visualized large  predominately fat signal lesion in the inseparable from the superficial aspect  of the right iliopsoas muscle, which has increased in AP dimension since  6/8/2011 exam (currently 8.2 cm, previously 4.9 cm). Fat density lesions within  the long head biceps femoris and vastus intermedius muscles. Soft tissues: Diffuse skin thickening and soft tissue edema and swelling. An  approximately 13.1 cm x 9.3 cm x 1.2 cm lobulated hyperdense focus in the soft  tissues anterior to the patella and patellar tendon ([phone], [phone]), concerning  for hematoma. Other: Atherosclerosis. Partially visualized aortobiiliac stent graft. Peritoneal dialysis catheter. Small volume ascites. Hysterectomy. Fat-containing  right intra-abdominal hernia    Impression  1. Diffuse soft tissue swelling of the right lower extremity. Suspected large  hematoma anterior to the patella and patellar tendon. 2.  Large predominantly fat density lesion in the right iliopsoas compartment  which is increased in size since 6/8/2011. This has no definite concerning  features on noncontrast CT and may simply reflect a lipoma, but given its  location, low-grade liposarcoma is a consideration. Additional fat density  lesions in the long head biceps femoris and vastus lateralis and intermedius  muscles. Follow-up as clinically indicated. Labs:   Recent Results (from the past 24 hour(s))   CBC WITH AUTOMATED DIFF    Collection Time: 03/03/23  5:32 AM   Result Value Ref Range    WBC 10.3 3.6 - 11.0 K/uL    RBC 2.72 (L) 3.80 - 5.20 M/uL    HGB 8.3 (L) 11.5 - 16.0 g/dL    HCT 27.0 (L) 35.0 - 47.0 %    MCV 99.3 (H) 80.0 - 99.0 FL    MCH 30.5 26.0 - 34.0 PG    MCHC 30.7 30.0 - 36.5 g/dL    RDW 15.6 (H) 11.5 - 14.5 %    PLATELET 454 (H) 450 - 400 K/uL    MPV 9.2 8.9 - 12.9 FL    NRBC 0.0 0  WBC    ABSOLUTE NRBC 0.00 0.00 - 0.01 K/uL    NEUTROPHILS 68 32 - 75 %    LYMPHOCYTES 17 12 - 49 %    MONOCYTES 9 5 - 13 %    EOSINOPHILS 4 0 - 7 %    BASOPHILS 1 0 - 1 %    IMMATURE GRANULOCYTES 1 (H) 0.0 - 0.5 %    ABS. NEUTROPHILS 7.1 1.8 - 8.0 K/UL    ABS. LYMPHOCYTES 1.8 0.8 - 3.5 K/UL    ABS. MONOCYTES 1.0 0.0 - 1.0 K/UL    ABS. EOSINOPHILS 0.4 0.0 - 0.4 K/UL    ABS. BASOPHILS 0.1 0.0 - 0.1 K/UL    ABS. IMM. GRANS. 0.1 (H) 0.00 - 0.04 K/UL    DF AUTOMATED     METABOLIC PANEL, COMPREHENSIVE    Collection Time: 03/03/23  5:32 AM   Result Value Ref Range    Sodium 138 136 - 145 mmol/L    Potassium 2.6 (LL) 3.5 - 5.1 mmol/L    Chloride 94 (L) 97 - 108 mmol/L    CO2 33 (H) 21 - 32 mmol/L    Anion gap 11 5 - 15 mmol/L    Glucose 145 (H) 65 - 100 mg/dL    BUN 30 (H) 6 - 20 MG/DL    Creatinine 5.66 (H) 0.55 - 1.02 MG/DL    BUN/Creatinine ratio 5 (L) 12 - 20      eGFR 8 (L) >60 ml/min/1.73m2    Calcium 7.2 (L) 8.5 - 10.1 MG/DL    Bilirubin, total 0.3 0.2 - 1.0 MG/DL    ALT (SGPT) 9 (L) 12 - 78 U/L    AST (SGOT) 14 (L) 15 - 37 U/L    Alk.  phosphatase 129 (H) 45 - 117 U/L    Protein, total 7.7 6.4 - 8.2 g/dL    Albumin 2.0 (L) 3.5 - 5.0 g/dL    Globulin 5.7 (H) 2.0 - 4.0 g/dL    A-G Ratio 0.4 (L) 1.1 - 2.2     SED RATE (ESR)    Collection Time: 03/03/23  5:32 AM   Result Value Ref Range    Sed rate, automated 128 (H) 0 - 30 mm/hr   C REACTIVE PROTEIN, QT    Collection Time: 03/03/23  5:32 AM   Result Value Ref Range C-Reactive protein 11.40 (H) 0.00 - 0.60 mg/dL   LACTIC ACID    Collection Time: 03/03/23  5:32 AM   Result Value Ref Range    Lactic acid 2.0 0.4 - 2.0 MMOL/L   SAMPLES BEING HELD    Collection Time: 03/03/23  5:32 AM   Result Value Ref Range    SAMPLES BEING HELD 1red,1blue     COMMENT        Add-on orders for these samples will be processed based on acceptable specimen integrity and analyte stability, which may vary by analyte. IRON PROFILE    Collection Time: 03/03/23  5:32 AM   Result Value Ref Range    Iron 20 (L) 35 - 150 ug/dL    TIBC 123 (L) 250 - 450 ug/dL    Iron % saturation 16 (L) 20 - 50 %   MAGNESIUM    Collection Time: 03/03/23  5:32 AM   Result Value Ref Range    Magnesium 2.0 1.6 - 2.4 mg/dL   HEMOGLOBIN A1C WITH EAG    Collection Time: 03/03/23  5:32 AM   Result Value Ref Range    Hemoglobin A1c 6.4 (H) 4.0 - 5.6 %    Est. average glucose 137 mg/dL   EKG, 12 LEAD, INITIAL    Collection Time: 03/03/23  6:08 AM   Result Value Ref Range    Ventricular Rate 112 BPM    Atrial Rate 112 BPM    P-R Interval 150 ms    QRS Duration 94 ms    Q-T Interval 374 ms    QTC Calculation (Bezet) 510 ms    Calculated P Axis 72 degrees    Calculated R Axis -2 degrees    Calculated T Axis 64 degrees    Diagnosis       Sinus tachycardia  Otherwise normal ECG  When compared with ECG of 19-OCT-2022 13:13,  No significant change was found  Confirmed by Sulma Harley M.D., Ginette Gamble ([id number]) on 3/3/2023 4:22:20 PM     GLUCOSE, POC    Collection Time: 03/03/23  3:36 PM   Result Value Ref Range    Glucose (POC) 161 (H) 65 - 117 mg/dL    Performed by Marilia Hallmark  PCT        Impression:   S/p R TKA; incision healing well, no evidence for infection; leg swelling can be normal at this point and take weeks to resolve. Minor pain is also expected. Pt indicates therapy is going well and she has minimal pain in the leg and came to the ED for respiratory issues.  No concern for R knee infection, compartment syndrome, nerve or vascular injury. Possible there is some ligamentous injury which is hard to discern given the acute pain. May need MRI. Plan:   Knee immobilizer, Ice, Ace wrap for swelling. Pain - APAP, Oxy prn for pain  PT/OT - WBAT    Dr. Aidan Clark is aware and agrees with above plan.       JAZ Marie  Orthopedic Trauma Service  [facility]

## 2023-03-03 NOTE — ED NOTES
Bedside shift change report given to Debi Kumar Dr (oncoming nurse) by Rachael Yi (offgoing nurse). Report included the following information SBAR, Kardex, ED Summary, Intake/Output, MAR, and Quality Measures.

## 2023-03-03 NOTE — PROGRESS NOTES
Admission Medication Reconciliation:    Information obtained from:  Patient, PD nurse  RxQuery data available¹:  YES    Comments/Recommendations: Updated PTA meds/reviewed patient's allergies. 1)  Patient was not a great historian of her medications even though she reports that she takes care of her own medications at home. She provided a medication list and said that her daughter-in-law wrote it for her. The listed was dated 12/13/2022. There were discrepancies between the list and what she said during the interview, and when these were pointed out patient seemed confused. Patient reports that her most recent furosemide dose was 2 tablets of 40mg twice daily = 80mg po twice daily. Patient reports that she gets PD from her PD nurse, and that she receives her epoetin twice a month now. Called her PD nurse Virgie Vernon and asked her for the dose (30,000 units twice a month--last dose 2/20/2023). Patient reports that she was not aware of her previous rosuvastatin dose reduction, and that she had been taking the 40mg daily as prescribed. She has not resumed her aspirin since her GI appointment. She has not been able to take her potassium as prescribed because of prescription issues at the pharmacy (where they refused to fill?). At the time of the interview patient reports that she keeps one pill of Percocet at home in case her back pain gets \"really bad\", and that she has had trouble obtaining pain medications for her back pain. 2)  Medication changes (since last review): Added  - Potassium chloride 20meq 1tab po daily (patient has not started)    Adjusted  - Furosemide is now 80mg po twice daily.  - Rosuvastatin is 40mg 1tab po daily. - Epoetin joni 30,000 units SC twice a month.     Removed  - Novolin 70/30  - Lactulose 10g/15mL  - Dulera inhaler     ¹RxQuery pharmacy benefit data reflects medications filled and processed through the patient's insurance, however   this data does NOT capture whether the medication was picked up or is currently being taken by the patient. Allergies:  Ivp dye [fd and c blue no.1]    Significant PMH/Disease States:   Past Medical History:   Diagnosis Date    Asthma     CAD (coronary artery disease)     CHF (congestive heart failure) ([facility])      ?    Chronic kidney disease      M- Radu Menon [phone]    COPD (chronic obstructive pulmonary disease) ([facility] [address]     PCP manages    Diabetes ([facility]     DM2    GERD (gastroesophageal reflux disease)     Hypertension      Chief Complaint for this Admission:    Chief Complaint   Patient presents with    Fall     Prior to Admission Medications:   Prior to Admission Medications   Prescriptions Last Dose Informant Taking? DULoxetine (CYMBALTA) 20 mg capsule   Yes   Sig: Take 20 mg by mouth two (2) times a day. albuterol (PROVENTIL HFA, VENTOLIN HFA, PROAIR HFA) 90 mcg/actuation inhaler   Yes   Sig: Take 2 Puffs by inhalation every six (6) hours as needed for Wheezing. albuterol-ipratropium (DUO-NEB) 2.5 mg-0.5 mg/3 ml nebu   Yes   Sig: 3 mL by Nebulization route every six (6) hours as needed for Wheezing. amLODIPine (NORVASC) 10 mg tablet   Yes   Sig: Take 10 mg by mouth daily. aspirin (ASPIRIN) 325 mg tablet  Family Member No   Sig: Take 325 mg by mouth daily. carvediloL (COREG) 25 mg tablet   Yes   Sig: Take 25 mg by mouth two (2) times daily (with meals). diclofenac (VOLTAREN) 1 % gel   Yes   Sig: Apply  to affected area daily. dulaglutide (Trulicity) 5.85 YE/8.3 mL sub-q pen   Yes   Si.75 mg by SubCUTAneous route every Monday. epoetin joni 20,000 unit/mL soln 20,000 Units, epoetin joni 10,000 unit/mL soln 10,000 Units 2023  Yes   Si,000 Units by SubCUTAneous route. Twice a month with PD   furosemide (LASIX) 40 mg tablet   Yes   Sig: Take 80 mg by mouth two (2) times a day. hydrALAZINE (APRESOLINE) 50 mg tablet   Yes   Sig: Take 1 Tablet by mouth three (3) times daily.    insulin glargine (LANTUS,BASAGLAR) 100 unit/mL (3 mL) inpn   Yes   Si Units by SubCUTAneous route daily (with lunch). montelukast (SINGULAIR) 10 mg tablet   Yes   Sig: Take 1 Tab by mouth nightly. potassium chloride (K-DUR, KLOR-CON M20) 20 mEq tablet   No   Sig: Take 20 mEq by mouth daily. rosuvastatin (CRESTOR) 40 mg tablet   Yes   Sig: Take 40 mg by mouth daily. Facility-Administered Medications: None     Please contact the main inpatient pharmacy with any questions or concerns at [phone] and we will direct you to the clinical pharmacist covering this patient's care while in-house.    Yvette Brooks, CARLOSD

## 2023-03-03 NOTE — PROGRESS NOTES
[facility]   [address] [address], [address] [facility]  Phone: [phone]   [phone]    [email or web address]     Nephrology Progress Note    Patient Name : Ambrose Lovett      : 1955     MRN : [id number]  Date of Admission : 3/3/2023  Date of Servive : 23    CC: Follow up for ESRD       Assessment and Plan   ESRD-PD :  -[facility], [facility], l Dr. Kim Valderrama  -Resume CCPD , substitute Dianeal for extraneal   -Ordered PD fluid cell count, Gram stain and culture  -Daily labs    Volume overloaded  -Intensify UF with PD  -Start diuretics    Hypokalemia: Chronic  Hypomagnesemia  -Ordered replacement    Anemia in CKD  -Check iron profile  -Start MADY    S/p GLF  RLE cellulitis  -Consult Ortho  -Hold ABX until PD fluid cell count obtained    DM2  -MX per primary team    HTN  -Continue home meds    CAD  HFpEF  GERD     Interval History:  Mrs. Romero is a 77-year-old -American woman history of ESR of 2/2 DM HTN and has been dialysis dependent. She switched to PD approximately 8 months ago and is followed by Dr. Kim Valderrama. Prior to that she was followed by Dr. Marycarmen Inman. Recently she had a repeat PET study  She has chronic hypokalemia since January but is unable to feel potassium prescription. She presented to ER after a fall and worsening RLE pain and swelling. She is having difficulty weightbearing. In the ER she had a head CT that showed bilateral intraparotid nodules. She had x-rays that rule out any fracture    With regards to her PD, she reports 1 day of cloudy fluid last week. She did not get PD fluid cell count or Gram stain at the time. She completed PD yesterday during the day prior to coming to the ER and reports clear fluid. Review of Systems: A comprehensive review of systems was negative except for that written in the HPI.     Current Medications:   Current Facility-Administered Medications   Medication Dose Route Frequency    vancomycin (VANCOCIN) 2000 mg in  ml infusion  2 g IntraVENous ONCE    potassium bicarb-citric acid (EFFER-K) tablet 40 mEq  40 mEq Oral Q2H    peritoneal dialysis DEXTROSE 4.25% (2.5 mEq/L low calcium) solution 6,000 mL  6,000 mL IntraPERitoneal DAILY    peritoneal dialysis DEXTROSE 2.5% (2.5 mEq/L low calcium) solution 6,000 mL  6,000 mL IntraPERitoneal DAILY    peritoneal dialysis DEXTROSE 2.5% (2.5 mEq/L low calcium) solution 2,000 mL  2,000 mL IntraPERitoneal DAILY    gentamicin (GARAMYCIN) 0.1 % ointment   Topical DAILY    [START ON 3/4/2023] epoetin joni-epbx (RETACRIT) injection 20,000 Units  20,000 Units SubCUTAneous Q TUE, THU & SAT    [START ON 3/4/2023] potassium bicarb-citric acid (EFFER-K) tablet 40 mEq  40 mEq Oral DAILY    magnesium sulfate 2 g/50 ml IVPB (premix or compounded)  2 g IntraVENous ONCE    magnesium oxide (MAG-OX) tablet 400 mg  400 mg Oral DAILY     Current Outpatient Medications   Medication Sig    amLODIPine (NORVASC) 10 mg tablet Take 10 mg by mouth daily. diclofenac (VOLTAREN) 1 % gel daily. DULoxetine (CYMBALTA) 20 mg capsule Take 20 mg by mouth two (2) times a day. furosemide (LASIX) 40 mg tablet Take 40 mg by mouth daily. lactulose 10 gram/15 mL (15 mL) soln Take 30 mL by mouth two (2) times daily as needed for PRN Reason (Other) (Constipation). carvediloL (COREG) 25 mg tablet Take 25 mg by mouth two (2) times daily (with meals). dulaglutide (Trulicity) 0.28 NO/6.2 mL sub-q pen 0.75 mg by SubCUTAneous route every seven (7) days. insulin glargine (LANTUS,BASAGLAR) 100 unit/mL (3 mL) inpn 20 Units by SubCUTAneous route daily. rosuvastatin (CRESTOR) 20 mg tablet Take 0.5 Tablets by mouth nightly. Take 1 Tab by mouth nightly. Dose reduced from 20 mg to 10 mg. (Patient taking differently: Take 10 mg by mouth nightly. Take 1 Tab by mouth nightly.   Dose reduced from 20 mg to 10 mg.)    epoetin joni-epbx (RETACRIT) 20,000 unit/mL injection 1 mL by SubCUTAneous route every Monday, Wednesday, Friday. Indications: anemia due to kidney failure  Indications: anemia due to kidney failure    hydrALAZINE (APRESOLINE) 50 mg tablet Take 1 Tablet by mouth three (3) times daily. mometasone-formoterol (DULERA) 200-5 mcg/actuation HFA inhaler Take 2 Puffs by inhalation two (2) times a day. insulin NPH/insulin regular (NOVOLIN 70/30 U-100 INSULIN) 100 unit/mL (70-30) injection 45 Units by SubCUTAneous route two (2) times a day. albuterol-ipratropium (DUO-NEB) 2.5 mg-0.5 mg/3 ml nebu 3 mL by Nebulization route every six (6) hours as needed. montelukast (SINGULAIR) 10 mg tablet Take 1 Tab by mouth nightly. aspirin (ASPIRIN) 325 mg tablet Take 325 mg by mouth daily. Allergies   Allergen Reactions    Ivp Dye [Fd And C Blue No.1] Hives       Objective:  Vitals:    Vitals:    03/03/23 0228 03/03/23 0604   BP: (!) 156/77 (!) 160/83   Pulse: (!) 118    Resp: 20    Temp: 98.6 °F (37 °C)    SpO2: 99% 94%     Intake and Output:  No intake/output data recorded. No intake/output data recorded. Physical Examination:        General: Obese  Neck:  Supple, no mass  Resp:  Diminished at bases   CV:  RRR,  no murmur or rub, pitting r >lLE edema  GI:  PD catheter exit site clean  Neurologic:  Non focal  Psych:             AAO x 3 appropriate affect   Extremities: RLE tenderness around knee with bruising and swelling  :  Victor + / -      []    High complexity decision making was performed  []    Patient is at high-risk of decompensation with multiple organ involvement    Lab Data Personally Reviewed: I have reviewed all the pertinent labs, microbiology data and radiology studies during assessment.     Recent Labs     03/03/23  0532      K 2.6*   CL 94*   CO2 33*   *   BUN 30*   CREA 5.66*   CA 7.2*   ALB 2.0*   ALT 9*     Recent Labs     03/03/23  0532   WBC 10.3   HGB 8.3*   HCT 27.0*   *     No results found for: SDES  Lab Results   Component Value Date/Time Culture result: MIXED UROGENITAL STANLEY ISOLATED 05/17/2022 09:52 AM    Culture result: NO GROWTH 5 DAYS 05/15/2022 03:51 AM    Culture result: NO GROWTH 5 DAYS 03/15/2018 06:09 PM     Recent Results (from the past 24 hour(s))   CBC WITH AUTOMATED DIFF    Collection Time: 03/03/23  5:32 AM   Result Value Ref Range    WBC 10.3 3.6 - 11.0 K/uL    RBC 2.72 (L) 3.80 - 5.20 M/uL    HGB 8.3 (L) 11.5 - 16.0 g/dL    HCT 27.0 (L) 35.0 - 47.0 %    MCV 99.3 (H) 80.0 - 99.0 FL    MCH 30.5 26.0 - 34.0 PG    MCHC 30.7 30.0 - 36.5 g/dL    RDW 15.6 (H) 11.5 - 14.5 %    PLATELET 485 (H) 781 - 400 K/uL    MPV 9.2 8.9 - 12.9 FL    NRBC 0.0 0  WBC    ABSOLUTE NRBC 0.00 0.00 - 0.01 K/uL    NEUTROPHILS 68 32 - 75 %    LYMPHOCYTES 17 12 - 49 %    MONOCYTES 9 5 - 13 %    EOSINOPHILS 4 0 - 7 %    BASOPHILS 1 0 - 1 %    IMMATURE GRANULOCYTES 1 (H) 0.0 - 0.5 %    ABS. NEUTROPHILS 7.1 1.8 - 8.0 K/UL    ABS. LYMPHOCYTES 1.8 0.8 - 3.5 K/UL    ABS. MONOCYTES 1.0 0.0 - 1.0 K/UL    ABS. EOSINOPHILS 0.4 0.0 - 0.4 K/UL    ABS. BASOPHILS 0.1 0.0 - 0.1 K/UL    ABS. IMM. GRANS. 0.1 (H) 0.00 - 0.04 K/UL    DF AUTOMATED     METABOLIC PANEL, COMPREHENSIVE    Collection Time: 03/03/23  5:32 AM   Result Value Ref Range    Sodium 138 136 - 145 mmol/L    Potassium 2.6 (LL) 3.5 - 5.1 mmol/L    Chloride 94 (L) 97 - 108 mmol/L    CO2 33 (H) 21 - 32 mmol/L    Anion gap 11 5 - 15 mmol/L    Glucose 145 (H) 65 - 100 mg/dL    BUN 30 (H) 6 - 20 MG/DL    Creatinine 5.66 (H) 0.55 - 1.02 MG/DL    BUN/Creatinine ratio 5 (L) 12 - 20      eGFR 8 (L) >60 ml/min/1.73m2    Calcium 7.2 (L) 8.5 - 10.1 MG/DL    Bilirubin, total 0.3 0.2 - 1.0 MG/DL    ALT (SGPT) 9 (L) 12 - 78 U/L    AST (SGOT) 14 (L) 15 - 37 U/L    Alk.  phosphatase 129 (H) 45 - 117 U/L    Protein, total 7.7 6.4 - 8.2 g/dL    Albumin 2.0 (L) 3.5 - 5.0 g/dL    Globulin 5.7 (H) 2.0 - 4.0 g/dL    A-G Ratio 0.4 (L) 1.1 - 2.2     SED RATE (ESR)    Collection Time: 03/03/23  5:32 AM   Result Value Ref Range    Sed rate, automated 128 (H) 0 - 30 mm/hr   C REACTIVE PROTEIN, QT    Collection Time: 03/03/23  5:32 AM   Result Value Ref Range    C-Reactive protein 11.40 (H) 0.00 - 0.60 mg/dL   LACTIC ACID    Collection Time: 03/03/23  5:32 AM   Result Value Ref Range    Lactic acid 2.0 0.4 - 2.0 MMOL/L   SAMPLES BEING HELD    Collection Time: 03/03/23  5:32 AM   Result Value Ref Range    SAMPLES BEING HELD 1red,1blue     COMMENT        Add-on orders for these samples will be processed based on acceptable specimen integrity and analyte stability, which may vary by analyte. I have reviewed the flowsheets. Chart and Pertinent Notes have been reviewed. No change in PMH ,family and social history from Consult note.       Yanira Barnard [facility]

## 2023-03-03 NOTE — PROGRESS NOTES
Problem: Mobility Impaired (Adult and Pediatric)  Goal: *Acute Goals and Plan of Care (Insert Text)  Description: FUNCTIONAL STATUS PRIOR TO ADMISSION: Patient was modified independent using a rolling walker and single point cane for functional mobility. Patient reports falls at home. HOME SUPPORT PRIOR TO ADMISSION: The patient lived with son but did not require assist.    Physical Therapy Goals  Initiated 3/3/2023  1. Patient will move from supine to sit and sit to supine  in bed with minimal assistance/contact guard assist within 7 day(s). 2.  Patient will transfer from bed to chair and chair to bed with minimal assistance/contact guard assist using the least restrictive device within 7 day(s). 3.  Patient will perform sit to stand with minimal assistance/contact guard assist within 7 day(s). 4.  Patient will ambulate with minimal assistance/contact guard assist for 50 feet with the least restrictive device within 7 day(s). 5.  Patient will ascend/descend 3 stairs with bilateral handrail(s) with minimal assistance/contact guard assist within 7 day(s). Outcome: Progressing Towards Goal  PHYSICAL THERAPY EVALUATION  Patient: Real Cr (86 y.o. female)  Date: 3/3/2023  Primary Diagnosis: Right leg swelling [M79.89]  Right leg pain [M79.604]  Peritoneal dialysis catheter in place [facility]) [Z99.2]  Acute hypokalemia [E87.6]       Precautions:   Fall      ASSESSMENT  Based on the objective data described below, the patient presents with decreased activity tolerance, R LE edema, R LE pain limiting mobility, unsteady gait, impaired balance, generalized weakness, and high risk for falls. Patient found supine in bed and agreeable to PT, spo2 99% on 1.5LPM. Patient weaned to RA with spo2 94-98% with activity. Patient required mod A x 1 for supine > sit and min-mod A x 2 for sit <> stand at RW.  Patient able to ambulate 8 feet within room with CGA and RW, demonstrating antalgic pattern with decreased stance on R LE 2/2 pain. Patient returned to EOB and required max A x 2 for sit > supine. Patient endorsing \"room spinning dizziness\" with supine <> sit at home, no nystagmus noted with transfers today or report of dizziness. Orthostatic assessment completed with patient demonstrating orthostatic hypotension in standing. Patient left supine in bed with all needs in reach. 03/03/23 1411 03/03/23 1418 03/03/23 1427   Vital Signs   Pulse (Heart Rate) 96 (!) 101 100   /68 114/71 (!) 97/59   MAP (Calculated) 86 85 72   BP Patient Position Supine Sitting Standing   O2 Sat (%) 96 % 97 % 99 %   O2 Device Nasal cannula None (Room air) None (Room air)      03/03/23 1428   Vital Signs   Pulse (Heart Rate) (!) 102   BP (!) 100/57   MAP (Calculated) 71   BP Patient Position Sitting  (following activity)   O2 Sat (%) 94 %   O2 Device None (Room air)     Current Level of Function Impacting Discharge (mobility/balance): bed mobility with mod A x 1, transfers with min A x 2, ambulates 8 feet with CGA and RW     Functional Outcome Measure: The patient scored Total Score: 10/28 on the Tinetti outcome measure which is indicative of high fall risk. Other factors to consider for discharge: spo2 stable on RA, limited by R LE Pain, orthostatic hypotension      Patient will benefit from skilled therapy intervention to address the above noted impairments. PLAN :  Recommendations and Planned Interventions: bed mobility training, transfer training, gait training, therapeutic exercises, neuromuscular re-education, patient and family training/education, and therapeutic activities      Frequency/Duration: Patient will be followed by physical therapy:  5 times a week to address goals.     Recommendation for discharge: (in order for the patient to meet his/her long term goals)  Physical therapy at least 2 days/week in the home     This discharge recommendation:  A follow-up discussion with the attending provider and/or case management is planned    IF patient discharges home will need the following DME: patient owns DME required for discharge         SUBJECTIVE:   Patient stated I fell trying to answer the front door.     OBJECTIVE DATA SUMMARY:   HISTORY:    Past Medical History:   Diagnosis Date    Asthma     CAD (coronary artery disease)     CHF (congestive heart failure) ([facility])      ?    Chronic kidney disease     HD - Radu Menon [phone]    COPD (chronic obstructive pulmonary disease) ([facility] [address]     PCP manages    Diabetes ([facility] [address]     DM2    GERD (gastroesophageal reflux disease)     Hypertension      Past Surgical History:   Procedure Laterality Date    COLONOSCOPY N/A 2023    COLONOSCOPY performed by Huey Ponce MD at [facility] ENDOSCOPY    DELIVERY       HX CORONARY STENT PLACEMENT      multiple pt unsure of how many    HX HYSTERECTOMY      IR INSERT NON TUNL CVC OVER 5 YRS  2021    IR INSERT TUNL CVC W/O PORT OVER 5 YR  2021    peritoneal dialysis    ND UNLISTED PROCEDURE CARDIAC SURGERY      ND UNLISTED PROCEDURE VASCULAR SURGERY      leg stent       Personal factors and/or comorbidities impacting plan of care: hx CAD, CHF, CKD, COPD    Home Situation  Home Environment: Private residence  # Steps to Enter: 3  Rails to Enter: Yes  Hand Rails : Bilateral  One/Two Story Residence: One story  Living Alone: No  Support Systems: Child(mansoor) (son (works FT))  Current DME Used/Available at Home: Nuha Para, straight, Lashae Taya, rollator  Tub or Shower Type: Tub/Shower combination    EXAMINATION/PRESENTATION/DECISION MAKING:   Critical Behavior:  Neurologic State: Alert  Orientation Level: Oriented X4  Cognition: Appropriate decision making  Safety/Judgement: Awareness of environment  Hearing:   Auditory  Auditory Impairment: None  Skin:  intact  Edema: R LE edema   Range Of Motion:  AROM: Generally decreased, functional           PROM: Generally decreased, functional           Strength:    Strength: Generally decreased, functional                    Tone & Sensation:   Tone: Normal              Sensation: Intact               Coordination:  Coordination: Generally decreased, functional  Vision:   Acuity: Within Defined Limits  Functional Mobility:  Bed Mobility:  Rolling: Minimum assistance;Assist x2  Supine to Sit: Moderate assistance;Assist x2  Sit to Supine: Maximum assistance;Assist x2  Scooting: Minimum assistance  Transfers:  Sit to Stand: Minimum assistance  Stand to Sit: Minimum assistance                       Balance:   Sitting: Impaired  Sitting - Static: Good (unsupported)  Sitting - Dynamic: Fair (occasional)  Standing: Impaired  Standing - Static: Fair;Constant support  Standing - Dynamic : Fair;Constant support  Ambulation/Gait Training:  Distance (ft): 8 Feet (ft)  Assistive Device: Walker, rolling;Gait belt  Ambulation - Level of Assistance: Contact guard assistance        Gait Abnormalities: Antalgic;Decreased step clearance;Trunk sway increased        Base of Support: Widened;Shift to left  Stance: Right decreased; Left increased  Speed/Maryann: Slow;Shuffled  Step Length: Right shortened;Left shortened  Swing Pattern: Right asymmetrical    Functional Measure:  Tinetti test:    Sitting Balance: 1  Arises: 0  Attempts to Rise: 0  Immediate Standing Balance: 1  Standing Balance: 1  Nudged: 2  Eyes Closed: 1  Turn 360 Degrees - Continuous/Discontinuous: 0  Turn 360 Degrees - Steady/Unsteady: 0  Sitting Down: 1  Balance Score: 7 Balance total score  Indication of Gait: 0  R Step Length/Height: 0  L Step Length/Height: 0  R Foot Clearance: 1  L Foot Clearance: 1  Step Symmetry: 0  Step Continuity: 0  Path: 1  Trunk: 0  Walking Time: 0  Gait Score: 3 Gait total score  Total Score: 10/28 Overall total score         Tinetti Tool Score Risk of Falls  <19 = High Fall Risk  19-24 = Moderate Fall Risk  25-28 = Low Fall Risk  Dannyetti ME. Performance-Oriented Assessment of Mobility Problems in Elderly Patients.  NATASHA 1986; 48:763-724. (Scoring Description: PT Bulletin Feb. 10, 1993)    Older adults: Guerline Blanco et al, 2009; n = 1000 [facility] elderly evaluated with ABC, LIANNE, ADL, and IADL)  · Mean LIANNE score for males aged 69-68 years = 26.21(3.40)  · Mean LIANNE score for females age 69-68 years = 25.16(4.30)  · Mean LIANNE score for males over 80 years = 23.29(6.02)  · Mean LIANNE score for females over 80 years = 17.20(8.32)        Physical Therapy Evaluation Charge Determination   History Examination Presentation Decision-Making   HIGH Complexity :3+ comorbidities / personal factors will impact the outcome/ POC  LOW Complexity : 1-2 Standardized tests and measures addressing body structure, function, activity limitation and / or participation in recreation  LOW Complexity : Stable, uncomplicated  Other outcome measures tinetti  HIGH       Based on the above components, the patient evaluation is determined to be of the following complexity level: LOW     Pain Rating:  Up to 9/10 R LE Pain in weightbearing - RN aware     Activity Tolerance:   Good, SpO2 stable on RA, and requires rest breaks      After treatment patient left in no apparent distress:   Supine in bed and Call bell within reach    COMMUNICATION/EDUCATION:   The patients plan of care was discussed with: Occupational therapist and Registered nurse. Fall prevention education was provided and the patient/caregiver indicated understanding.     Thank you for this referral.  Pepe Hall, PT   Time Calculation: 28 mins

## 2023-03-03 NOTE — ED PROVIDER NOTES
Patient is a 69-year-old female presented the ED after a fall on Tuesday with pain in her left upper back and right knee. Patient is on peritoneal dialysis. Patient's right leg is painful and swollen. Patient reports hitting her head but denies loss of consciousness. No blood thinners. Vital signs stable. Patient afebrile. ABCs intact. The history is provided by the patient and medical records.    Fall       Past Medical History:   Diagnosis Date    Asthma     CAD (coronary artery disease)     CHF (congestive heart failure) ([facility])      ?    Chronic kidney disease     HD  Radu Sinan [phone]    COPD (chronic obstructive pulmonary disease) ([facility])     PCP manages    Diabetes ([facility] [address]     DM2    GERD (gastroesophageal reflux disease)     Hypertension        Past Surgical History:   Procedure Laterality Date    COLONOSCOPY N/A 2023    COLONOSCOPY performed by Marie Galicia MD at [facility] ENDOSCOPY    DELIVERY       HX CORONARY STENT PLACEMENT      multiple pt unsure of how many    HX HYSTERECTOMY      IR INSERT NON TUNL CVC OVER 5 YRS  2021    IR INSERT TUNL CVC W/O PORT OVER 5 YR  2021    peritoneal dialysis    DC UNLISTED PROCEDURE CARDIAC SURGERY      DC UNLISTED PROCEDURE VASCULAR SURGERY      leg stent         Family History:   Problem Relation Age of Onset    Heart Disease Mother     Cancer Father        Social History     Socioeconomic History    Marital status: SINGLE     Spouse name: Not on file    Number of children: Not on file    Years of education: Not on file    Highest education level: Not on file   Occupational History    Not on file   Tobacco Use    Smoking status: Former     Packs/day: 0.25     Years: 10.00     Pack years: 2.50     Types: Cigarettes    Smokeless tobacco: Never   Vaping Use    Vaping Use: Never used   Substance and Sexual Activity    Alcohol use: No    Drug use: Never    Sexual activity: Not on file   Other Topics Concern    Not on file   Social History Narrative    Not on file     Social Determinants of Health     Financial Resource Strain: Not on file   Food Insecurity: Not on file   Transportation Needs: Not on file   Physical Activity: Not on file   Stress: Not on file   Social Connections: Not on file   Intimate Partner Violence: Not on file   Housing Stability: Not on file         ALLERGIES: Ivp dye [fd and c blue no. 1]    Review of Systems   Constitutional:  Positive for activity change, appetite change and fatigue. Musculoskeletal:  Positive for arthralgias, back pain and myalgias. Skin:  Positive for color change. Vitals:    03/03/23 0228 03/03/23 0604   BP: (!) 156/77 (!) 160/83   Pulse: (!) 118    Resp: 20    Temp: 98.6 °F (37 °C)    SpO2: 99% 94%            Physical Exam  Vitals and nursing note reviewed. Constitutional:       Appearance: Normal appearance. HENT:      Head: Normocephalic and atraumatic. Right Ear: External ear normal.      Left Ear: External ear normal.   Eyes:      Conjunctiva/sclera: Conjunctivae normal.   Cardiovascular:      Rate and Rhythm: Normal rate and regular rhythm. Pulses: Normal pulses. Heart sounds: Normal heart sounds. Pulmonary:      Effort: Pulmonary effort is normal.      Breath sounds: Normal breath sounds. Chest:      Chest wall: No deformity or tenderness. Abdominal:      Palpations: Abdomen is soft. Tenderness: There is no abdominal tenderness. Musculoskeletal:         General: Swelling and tenderness present. No deformity or signs of injury. Normal range of motion. Right lower leg: Edema present. Left lower leg: No edema. Comments: Patient has swelling, tenderness and erythema of the entire right leg. There is some bruising on the distal portion of the anterior aspect of the knee. Skin:     General: Skin is warm and dry. Coloration: Skin is not jaundiced. Findings: Erythema present. Neurological:      General: No focal deficit present. Mental Status: She is alert and oriented to person, place, and time. Psychiatric:         Mood and Affect: Mood normal.         Behavior: Behavior normal.        Medical Decision Making  Amount and/or Complexity of Data Reviewed  Labs: ordered. Decision-making details documented in ED Course. Radiology: ordered and independent interpretation performed. Decision-making details documented in ED Course. ECG/medicine tests: ordered and independent interpretation performed. Decision-making details documented in ED Course. Discussion of management or test interpretation with external provider(s): Discussed patient with nephrology, they will replete electrolytes and order peritoneal dialysis. Risk  Prescription drug management. Decision regarding hospitalization. ED Course as of 03/03/23 0709   Fri Mar 03, 2023   0400 CT HEAD WO CONT  No acute bleeds on head CT [AL]   0608 WBC: 10.3 [AL]   0608 HGB(!): 8.3  Near baseline [AL]   0621 EKG interpretation:   Rhythm: sinus tachycardia; and regular . Rate (approx.): 112; Axis: normal; Intervals: normal ; ST/T wave: normal; EKG documented and interpreted by Li Molina. Maverick Mauricio MD, Emergency Medicine.     [AL]   6638 C-Reactive protein(!): 11.40 [AL]   0703 Sed rate, automated(!): 128 [AL]   0703 Potassium(!!): 2.6 [AL]   0703 Creatinine(!): 5.66 [AL]      ED Course User Index  [AL] Jett Denis MD       LABORATORY RESULTS:  Labs Reviewed   CBC WITH AUTOMATED DIFF - Abnormal; Notable for the following components:       Result Value    RBC 2.72 (*)     HGB 8.3 (*)     HCT 27.0 (*)     MCV 99.3 (*)     RDW 15.6 (*)     PLATELET 378 (*)     IMMATURE GRANULOCYTES 1 (*)     ABS. IMM.  GRANS. 0.1 (*)     All other components within normal limits   METABOLIC PANEL, COMPREHENSIVE - Abnormal; Notable for the following components:    Potassium 2.6 (*)     Chloride 94 (*)     CO2 33 (*)     Glucose 145 (*)     BUN 30 (*)     Creatinine 5.66 (*)     BUN/Creatinine ratio 5 (*)     eGFR 8 (*)     Calcium 7.2 (*)     ALT (SGPT) 9 (*)     AST (SGOT) 14 (*)     Alk. phosphatase 129 (*)     Albumin 2.0 (*)     Globulin 5.7 (*)     A-G Ratio 0.4 (*)     All other components within normal limits   SED RATE (ESR) - Abnormal; Notable for the following components:    Sed rate, automated 128 (*)     All other components within normal limits   C REACTIVE PROTEIN, QT - Abnormal; Notable for the following components:    C-Reactive protein 11.40 (*)     All other components within normal limits   CULTURE, BLOOD, PAIRED   CULTURE, BODY FLUID W GRAM STAIN   LACTIC ACID   SAMPLES BEING HELD   CELL COUNT, BODY FLUID   IRON PROFILE       IMAGING RESULTS:  CT HEAD WO CONT   Final Result      1. No acute traumatic injury. 2. Enlarged bilateral intraparotid nodules 18 mm partly visualized, consider   nonemergent CT neck. XR KNEE RT 3 V   Final Result   No acute fracture. XR CHEST PA LAT   Final Result   Pulmonary vascular congestion.           DUPLEX LOWER EXT VENOUS RIGHT    (Results Pending)       MEDICATIONS GIVEN:  Medications   vancomycin (VANCOCIN) 2000 mg in  ml infusion (has no administration in time range)   potassium bicarb-citric acid (EFFER-K) tablet 40 mEq (40 mEq Oral Given 3/3/23 0656)   peritoneal dialysis DEXTROSE 4.25% (2.5 mEq/L low calcium) solution 6,000 mL (has no administration in time range)   peritoneal dialysis DEXTROSE 2.5% (2.5 mEq/L low calcium) solution 6,000 mL (has no administration in time range)   peritoneal dialysis DEXTROSE 2.5% (2.5 mEq/L low calcium) solution 2,000 mL (has no administration in time range)   gentamicin (GARAMYCIN) 0.1 % ointment (has no administration in time range)   epoetin joni-epbx (RETACRIT) injection 20,000 Units (has no administration in time range)   potassium bicarb-citric acid (EFFER-K) tablet 40 mEq (has no administration in time range)   magnesium sulfate 2 g/50 ml IVPB (premix or compounded) (has no administration in time range)   magnesium oxide (MAG-OX) tablet 400 mg (has no administration in time range)   oxyCODONE IR (ROXICODONE) tablet 5 mg (5 mg Oral Given 3/3/23 3152)   cefepime (MAXIPIME) 2 g in 0.9% sodium chloride 10 mL IV syringe (2 g IntraVENous Given 3/3/23 3122)       Differential diagnosis: DVT, muscle skeletal pain, knee injury, knee effusion, cellulitis, lymphadenopathy    ED physician interpretation of EKG: No STEMI. See my interpretation EKG in ED course above. ED physician interpretation of laboratory results: Significant elevation inflammatory markers concerning for worsening cellulitis. No leukocytosis. MDM: Patient is a 26-year-old female presented ED with right leg pain from the knee down, upper back pain as well as a fall with head trauma few days ago. No obvious fractures on imaging. No DVT on ultrasound. However patient has significant swelling from the knee down concerning for cellulitis. Antibiotics given. Nephrology consulted and patient admitted to hospital for further treatment evaluation. DISPOSITION: Admitted    Perfect Serve Consult for Admission  6:03 AM    ED Room Number: ER09/09  Patient Name and age:  Tad Jurado 75 y.o.  female  Working Diagnosis:   1. Right leg swelling    2. Right leg pain    3. Peritoneal dialysis catheter in place [facility])        COVID-19 Suspicion:  no  Sepsis present:  no  Reassessment needed: yes  Code Status:  Full Code  Readmission: no  Isolation Requirements:  no  Recommended Level of Care:  telemetry  Department: [facility] Adult ED - 21     Other: Patient presents after fall now has red swollen right leg. DVT study negative. Complex picture but could be consistent with cellulitis. Blood cultures and lactic acid obtained. Broad-spectrum antibiotics given. Nephrology consulted for PD. Violeta Luong.  Eulalio Orozco MD        Procedures

## 2023-03-04 LAB
ALBUMIN SERPL-MCNC: 1.8 G/DL (ref 3.5–5)
ANION GAP SERPL CALC-SCNC: 7 MMOL/L (ref 5–15)
BASOPHILS # BLD: 0.1 K/UL (ref 0–0.1)
BASOPHILS NFR BLD: 1 % (ref 0–1)
BUN SERPL-MCNC: 27 MG/DL (ref 6–20)
BUN/CREAT SERPL: 5 (ref 12–20)
CALCIUM SERPL-MCNC: 7.4 MG/DL (ref 8.5–10.1)
CHLORIDE SERPL-SCNC: 96 MMOL/L (ref 97–108)
CO2 SERPL-SCNC: 35 MMOL/L (ref 21–32)
CREAT SERPL-MCNC: 5.03 MG/DL (ref 0.55–1.02)
DIFFERENTIAL METHOD BLD: ABNORMAL
EOSINOPHIL # BLD: 0.4 K/UL (ref 0–0.4)
EOSINOPHIL NFR BLD: 5 % (ref 0–7)
ERYTHROCYTE [DISTWIDTH] IN BLOOD BY AUTOMATED COUNT: 15.4 % (ref 11.5–14.5)
EST. AVERAGE GLUCOSE BLD GHB EST-MCNC: 137 MG/DL
FERRITIN SERPL-MCNC: 906 NG/ML (ref 8–252)
GLUCOSE BLD STRIP.AUTO-MCNC: 134 MG/DL (ref 65–117)
GLUCOSE BLD STRIP.AUTO-MCNC: 149 MG/DL (ref 65–117)
GLUCOSE BLD STRIP.AUTO-MCNC: 151 MG/DL (ref 65–117)
GLUCOSE BLD STRIP.AUTO-MCNC: 316 MG/DL (ref 65–117)
GLUCOSE SERPL-MCNC: 211 MG/DL (ref 65–100)
HBA1C MFR BLD: 6.4 % (ref 4–5.6)
HCT VFR BLD AUTO: 25.8 % (ref 35–47)
HGB BLD-MCNC: 7.7 G/DL (ref 11.5–16)
IMM GRANULOCYTES # BLD AUTO: 0.1 K/UL (ref 0–0.04)
IMM GRANULOCYTES NFR BLD AUTO: 1 % (ref 0–0.5)
LYMPHOCYTES # BLD: 1.2 K/UL (ref 0.8–3.5)
LYMPHOCYTES NFR BLD: 14 % (ref 12–49)
MAGNESIUM SERPL-MCNC: 2.2 MG/DL (ref 1.6–2.4)
MCH RBC QN AUTO: 29.8 PG (ref 26–34)
MCHC RBC AUTO-ENTMCNC: 29.8 G/DL (ref 30–36.5)
MCV RBC AUTO: 100 FL (ref 80–99)
MONOCYTES # BLD: 1 K/UL (ref 0–1)
MONOCYTES NFR BLD: 11 % (ref 5–13)
NEUTS SEG # BLD: 5.9 K/UL (ref 1.8–8)
NEUTS SEG NFR BLD: 68 % (ref 32–75)
NRBC # BLD: 0 K/UL (ref 0–0.01)
NRBC BLD-RTO: 0 PER 100 WBC
PHOSPHATE SERPL-MCNC: 4.2 MG/DL (ref 2.6–4.7)
PLATELET # BLD AUTO: 364 K/UL (ref 150–400)
PMV BLD AUTO: 9.3 FL (ref 8.9–12.9)
POTASSIUM SERPL-SCNC: 3.7 MMOL/L (ref 3.5–5.1)
RBC # BLD AUTO: 2.58 M/UL (ref 3.8–5.2)
SERVICE CMNT-IMP: ABNORMAL
SODIUM SERPL-SCNC: 138 MMOL/L (ref 136–145)
WBC # BLD AUTO: 8.6 K/UL (ref 3.6–11)

## 2023-03-04 PROCEDURE — 80069 RENAL FUNCTION PANEL: CPT

## 2023-03-04 PROCEDURE — 82962 GLUCOSE BLOOD TEST: CPT

## 2023-03-04 PROCEDURE — 83735 ASSAY OF MAGNESIUM: CPT

## 2023-03-04 PROCEDURE — 85025 COMPLETE CBC W/AUTO DIFF WBC: CPT

## 2023-03-04 PROCEDURE — 74011250636 HC RX REV CODE- 250/636: Performed by: NURSE PRACTITIONER

## 2023-03-04 PROCEDURE — 90945 DIALYSIS ONE EVALUATION: CPT

## 2023-03-04 PROCEDURE — 74011250637 HC RX REV CODE- 250/637: Performed by: NURSE PRACTITIONER

## 2023-03-04 PROCEDURE — 82728 ASSAY OF FERRITIN: CPT

## 2023-03-04 PROCEDURE — A4726 DIALYS SOL FLD VOL > 5999CC: HCPCS | Performed by: NURSE PRACTITIONER

## 2023-03-04 PROCEDURE — 94760 N-INVAS EAR/PLS OXIMETRY 1: CPT

## 2023-03-04 PROCEDURE — 74011250637 HC RX REV CODE- 250/637: Performed by: INTERNAL MEDICINE

## 2023-03-04 PROCEDURE — 36415 COLL VENOUS BLD VENIPUNCTURE: CPT

## 2023-03-04 PROCEDURE — 83036 HEMOGLOBIN GLYCOSYLATED A1C: CPT

## 2023-03-04 PROCEDURE — A4726 DIALYS SOL FLD VOL > 5999CC: HCPCS | Performed by: INTERNAL MEDICINE

## 2023-03-04 PROCEDURE — 74011000250 HC RX REV CODE- 250: Performed by: STUDENT IN AN ORGANIZED HEALTH CARE EDUCATION/TRAINING PROGRAM

## 2023-03-04 PROCEDURE — 74011250636 HC RX REV CODE- 250/636: Performed by: STUDENT IN AN ORGANIZED HEALTH CARE EDUCATION/TRAINING PROGRAM

## 2023-03-04 PROCEDURE — 65270000046 HC RM TELEMETRY

## 2023-03-04 PROCEDURE — 94640 AIRWAY INHALATION TREATMENT: CPT

## 2023-03-04 PROCEDURE — 74011000250 HC RX REV CODE- 250: Performed by: NURSE PRACTITIONER

## 2023-03-04 PROCEDURE — 74011250636 HC RX REV CODE- 250/636: Performed by: INTERNAL MEDICINE

## 2023-03-04 PROCEDURE — 74011636637 HC RX REV CODE- 636/637: Performed by: CLINICAL NURSE SPECIALIST

## 2023-03-04 RX ADMIN — HYDRALAZINE HYDROCHLORIDE 50 MG: 50 TABLET, FILM COATED ORAL at 10:06

## 2023-03-04 RX ADMIN — POTASSIUM BICARBONATE 40 MEQ: 782 TABLET, EFFERVESCENT ORAL at 10:06

## 2023-03-04 RX ADMIN — MONTELUKAST 10 MG: 10 TABLET, FILM COATED ORAL at 22:42

## 2023-03-04 RX ADMIN — HEPARIN SODIUM 5000 UNITS: 5000 INJECTION INTRAVENOUS; SUBCUTANEOUS at 13:17

## 2023-03-04 RX ADMIN — AMLODIPINE BESYLATE 10 MG: 5 TABLET ORAL at 10:06

## 2023-03-04 RX ADMIN — MORPHINE SULFATE 2 MG: 2 INJECTION, SOLUTION INTRAMUSCULAR; INTRAVENOUS at 04:34

## 2023-03-04 RX ADMIN — MORPHINE SULFATE 2 MG: 2 INJECTION, SOLUTION INTRAMUSCULAR; INTRAVENOUS at 22:43

## 2023-03-04 RX ADMIN — SODIUM CHLORIDE, SODIUM LACTATE, CALCIUM CHLORIDE, MAGNESIUM CHLORIDE AND DEXTROSE 6000 ML: 4.25; 538; 448; 18.3; 5.08 INJECTION, SOLUTION INTRAPERITONEAL at 16:46

## 2023-03-04 RX ADMIN — OXYCODONE HYDROCHLORIDE 5 MG: 5 TABLET ORAL at 10:11

## 2023-03-04 RX ADMIN — HEPARIN SODIUM: 1000 INJECTION INTRAVENOUS; SUBCUTANEOUS at 19:31

## 2023-03-04 RX ADMIN — Medication 2 UNITS: at 22:43

## 2023-03-04 RX ADMIN — HYDRALAZINE HYDROCHLORIDE 50 MG: 50 TABLET, FILM COATED ORAL at 22:42

## 2023-03-04 RX ADMIN — OXYCODONE HYDROCHLORIDE 5 MG: 5 TABLET ORAL at 18:51

## 2023-03-04 RX ADMIN — ARFORMOTEROL TARTRATE: 15 SOLUTION RESPIRATORY (INHALATION) at 07:53

## 2023-03-04 RX ADMIN — OXYCODONE HYDROCHLORIDE 5 MG: 5 TABLET ORAL at 00:21

## 2023-03-04 RX ADMIN — HEPARIN SODIUM: 1000 INJECTION INTRAVENOUS; SUBCUTANEOUS at 19:30

## 2023-03-04 RX ADMIN — HEPARIN SODIUM 5000 UNITS: 5000 INJECTION INTRAVENOUS; SUBCUTANEOUS at 22:42

## 2023-03-04 RX ADMIN — CEFTRIAXONE 2 G: 2 INJECTION, POWDER, FOR SOLUTION INTRAMUSCULAR; INTRAVENOUS at 10:06

## 2023-03-04 RX ADMIN — HEPARIN SODIUM 5000 UNITS: 5000 INJECTION INTRAVENOUS; SUBCUTANEOUS at 00:21

## 2023-03-04 RX ADMIN — MAGNESIUM OXIDE 400 MG (241.3 MG MAGNESIUM) TABLET 400 MG: TABLET at 10:06

## 2023-03-04 RX ADMIN — INSULIN GLARGINE 15 UNITS: 100 INJECTION, SOLUTION SUBCUTANEOUS at 10:06

## 2023-03-04 RX ADMIN — FUROSEMIDE 80 MG: 40 TABLET ORAL at 10:06

## 2023-03-04 RX ADMIN — DULOXETINE HYDROCHLORIDE 20 MG: 20 CAPSULE, DELAYED RELEASE ORAL at 10:06

## 2023-03-04 RX ADMIN — ARFORMOTEROL TARTRATE: 15 SOLUTION RESPIRATORY (INHALATION) at 20:10

## 2023-03-04 RX ADMIN — EPOETIN ALFA-EPBX 20000 UNITS: 20000 INJECTION, SOLUTION INTRAVENOUS; SUBCUTANEOUS at 22:55

## 2023-03-04 RX ADMIN — MONTELUKAST 10 MG: 10 TABLET, FILM COATED ORAL at 00:21

## 2023-03-04 RX ADMIN — SODIUM CHLORIDE, SODIUM LACTATE, CALCIUM CHLORIDE, MAGNESIUM CHLORIDE AND DEXTROSE 6000 ML: 2.5; 538; 448; 18.3; 5.08 INJECTION, SOLUTION INTRAPERITONEAL at 16:46

## 2023-03-04 RX ADMIN — ASPIRIN 325 MG ORAL TABLET 325 MG: 325 PILL ORAL at 10:06

## 2023-03-04 RX ADMIN — ROSUVASTATIN 10 MG: 10 TABLET, FILM COATED ORAL at 10:06

## 2023-03-04 RX ADMIN — DICLOFENAC 2 G: 10 GEL TOPICAL at 10:11

## 2023-03-04 RX ADMIN — CARVEDILOL 25 MG: 12.5 TABLET, FILM COATED ORAL at 22:51

## 2023-03-04 RX ADMIN — GENTAMICIN SULFATE: 1 CREAM TOPICAL at 16:47

## 2023-03-04 RX ADMIN — CARVEDILOL 25 MG: 12.5 TABLET, FILM COATED ORAL at 10:11

## 2023-03-04 NOTE — PROGRESS NOTES
[address] [facility]  Hospitalist Group                                                                                          Hospitalist Progress Note  Nilton Gonzalez MD  Answering service: [phone] from in house phone        Date of Service:  3/4/2023  NAME:  Tad Jurado  :  1955  MRN:  [id number]       Admission Summary:   Tad Jurado is a 75 y.o. female who presents with medical hx of recent fall, hypertension, type II dm, CAD, s/p PCI, CHF, CKD on PD exchange, COPD, GERD was admitted to the hospital on 3/3 with RLE pain and swelling. Pt fell on 3/1, hit her head but no LOC. Pt was able get back up after the fall, was able to move around initially. Pt did not seek for medical after immediately after the fall. Pt decided to seek for medical help when her RLE pain continue to get worse. Interval history / Subjective:   Patient seen and examined earlier this morning by me for follow-up of right lower extremity injury and concern for peritonitis. She denies abdominal pain at the time my exam.  Has severe right lower extremity pain. Assessment & Plan:     RLE pain  ? Cellulitis  S/p fall  - afebrile, wbc normal    BC (3/3) pending  Patient's right lower extremity is erythematous and very warm. Hard to differentiate cellulitis from just the hematoma in the injury. Pending MRI.   On empiric vancomycin and ceftriaxone  Orthopedic surgery has seen, no concern for compartment syndrome at this time  MRI of right lower extremity ordered for evaluation of ligaments     ESRD-PD with concern for SBP  - nephrology following;  PD fluid cell count looks like peritonitis  Continue ceftriaxone  Pending cultures     Volume overloaded  - continue with UF with PD    Continue with lasix     Hypertension  - continue with hydralazine, coreg     Type II DM  - A1C 6.4    Continue with insulin therapy    Diabetic educator consult in place     Depression  - continue with cympbalta COPD  - not in exacerbation    On Dulera at home; continue with arformeterol/budesonide neb tmt here    Continue with Singulair     Code status: Full  Prophylaxis: Heparin  Care Plan discussed with: Patient, nurse  Anticipated Disposition: 48 hours  Inpatient  Cardiac monitoring: Telemetry     Hospital Problems  Date Reviewed: 11/28/2022            Codes Class Noted POA    Acute hypokalemia ICD-10-CM: E87.6  ICD-9-CM: 276.8  3/3/2023 Unknown        Right leg pain ICD-10-CM: M79.604  ICD-9-CM: 729.5  3/3/2023 Unknown        Peritoneal dialysis catheter in place [facility]) ICD-10-CM: Z99.2  ICD-9-CM: V45.11  3/3/2023 Unknown        Right leg swelling ICD-10-CM: M79.89  ICD-9-CM: 729.81  3/3/2023 Unknown           Social Determinants of Health     Tobacco Use: Medium Risk    Smoking Tobacco Use: Former    Smokeless Tobacco Use: Never    Passive Exposure: Not on file   Alcohol Use: Not on file   Financial Resource Strain: Not on file   Food Insecurity: Not on file   Transportation Needs: Not on file   Physical Activity: Not on file   Stress: Not on file   Social Connections: Not on file   Intimate Partner Violence: Not on file   Depression: Not on file   Housing Stability: Not on file       Review of Systems:   A comprehensive review of systems was negative except for that written in the HPI. Vital Signs:    Last 24hrs VS reviewed since prior progress note.  Most recent are:  Visit Vitals  /68   Pulse 91   Temp 99.4 °F (37.4 °C)   Resp 16   Wt 92.5 kg (204 lb)   SpO2 97%   BMI 33.95 kg/m²         Intake/Output Summary (Last 24 hours) at 3/4/2023 1415  Last data filed at 3/4/2023 0321  Gross per 24 hour   Intake --   Output 814 ml   Net -814 ml        Physical Examination:     I had a face to face encounter with this patient and independently examined them on 3/4/2023 as outlined below:          General : alert x 3, awake, no acute distress,   HEENT: PEERL, EOMI, moist mucus membrane  Neck: supple, no JVD, no meningeal signs  Chest: Clear to auscultation bilaterally   CVS: S1 S2 heard, Capillary refill less than 2 seconds  Abd: soft/ non tender, non distended, BS physiological,   Ext: Right lower extremity at the knee and below is edematous, erythematous, and warm. Raised. Left lower extremity normal range of motion. Nonpitting edema right lower extremity. Left lower extremity no edema  Neuro/Psych: pleasant mood and affect, CN 2-12 grossly intact, sensory grossly within normal limit, Strength 5/5 in all extremities  Skin: warm     Data Review:    Review and/or order of clinical lab test  Review and/or order of tests in the radiology section of CPT  Review and/or order of tests in the medicine section of CPT      I have personally and independently reviewed all pertinent labs, diagnostic studies, imaging, and have provided independent interpretation of the same. Labs:     Recent Labs     03/04/23 0420 03/03/23  0532   WBC 8.6 10.3   HGB 7.7* 8.3*   HCT 25.8* 27.0*    409*     Recent Labs     03/04/23 0420 03/03/23 1644 03/03/23  0532    138 138   K 3.7 3.4* 2.6*   CL 96* 97 94*   CO2 35* 35* 33*   BUN 27* 31* 30*   CREA 5.03* 5.65* 5.66*   * 150* 145*   CA 7.4* 6.6* 7.2*   MG 2.2  --  2.0   PHOS 4.2 4.4  --      Recent Labs     03/04/23 0420 03/03/23 1644 03/03/23  0532   ALT  --   --  9*   AP  --   --  129*   TBILI  --   --  0.3   TP  --   --  7.7   ALB 1.8* 1.6* 2.0*   GLOB  --   --  5.7*     No results for input(s): INR, PTP, APTT, INREXT in the last 72 hours. Recent Labs     03/03/23  0532   TIBC 123*   PSAT 16*      Lab Results   Component Value Date/Time    Folate 5.2 10/19/2022 06:33 PM      No results for input(s): PH, PCO2, PO2 in the last 72 hours. No results for input(s): CPK, CKNDX, TROIQ in the last 72 hours.     No lab exists for component: CPKMB  Lab Results   Component Value Date/Time    Cholesterol, total 260 (H) 07/02/2018 04:27 AM    HDL Cholesterol 31 07/02/2018 04:27 AM    LDL, calculated 197.2 (H) 07/02/2018 04:27 AM    Triglyceride 159 (H) 07/02/2018 04:27 AM    CHOL/HDL Ratio 8.4 (H) 07/02/2018 04:27 AM     Lab Results   Component Value Date/Time    Glucose (POC) 149 (H) 03/04/2023 12:11 PM    Glucose (POC) 151 (H) 03/04/2023 07:41 AM    Glucose (POC) 322 (H) 03/03/2023 11:33 PM    Glucose (POC) 161 (H) 03/03/2023 03:36 PM    Glucose (POC) 159 (H) 01/09/2023 03:20 PM    Glucose (POC) 166 (H) 01/09/2023 03:05 PM    Glucose (POC) 156 (H) 10/25/2022 11:57 AM     Lab Results   Component Value Date/Time    Color YELLOW/STRAW 12/02/2021 11:00 PM    Appearance CLEAR 12/02/2021 11:00 PM    Specific gravity 1.025 12/02/2021 11:00 PM    Specific gravity 1.010 06/08/2011 12:00 AM    pH (UA) 5.5 12/02/2021 11:00 PM    Protein 300 (A) 12/02/2021 11:00 PM    Glucose 500 (A) 12/02/2021 11:00 PM    Ketone Negative 12/02/2021 11:00 PM    Bilirubin Negative 12/02/2021 11:00 PM    Urobilinogen 1.0 12/02/2021 11:00 PM    Nitrites Negative 12/02/2021 11:00 PM    Leukocyte Esterase Negative 12/02/2021 11:00 PM    Epithelial cells FEW 12/02/2021 11:00 PM    Bacteria Negative 12/02/2021 11:00 PM    WBC 0-4 12/02/2021 11:00 PM    RBC 0-5 12/02/2021 11:00 PM       Notes reviewed from all clinical/nonclinical/nursing services involved in patient's clinical care. Care coordination discussions were held with appropriate clinical/nonclinical/ nursing providers based on care coordination needs. Patients current active Medications were reviewed, considered, added and adjusted based on the clinical condition today. Home Medications were reconciled to the best of my ability given all available resources at the time of admission. Route is PO if not otherwise noted.       Admission Status:[id number]:::1}      Medications Reviewed:     Current Facility-Administered Medications   Medication Dose Route Frequency    cefTRIAXone (ROCEPHIN) 2 g in 0.9% sodium chloride 20 mL IV syringe  2 g IntraVENous Q24H    vancomycin - pharmacy to dose    Other Rx Dosing/Monitoring    peritoneal dialysis DEXTROSE 4.25% (2.5 mEq/L low calcium) solution 6,000 mL  6,000 mL IntraPERitoneal DAILY    peritoneal dialysis DEXTROSE 2.5% (2.5 mEq/L low calcium) solution 6,000 mL  6,000 mL IntraPERitoneal DAILY    epoetin joni-epbx (RETACRIT) injection 20,000 Units  20,000 Units SubCUTAneous Q TUE, THU & SAT    potassium bicarb-citric acid (EFFER-K) tablet 40 mEq  40 mEq Oral DAILY    magnesium oxide (MAG-OX) tablet 400 mg  400 mg Oral DAILY    morphine injection 2 mg  2 mg IntraVENous Q3H PRN    oxyCODONE IR (ROXICODONE) tablet 5 mg  5 mg Oral Q6H PRN    albuterol-ipratropium (DUO-NEB) 2.5 MG-0.5 MG/3 ML  3 mL Nebulization Q6H PRN    amLODIPine (NORVASC) tablet 10 mg  10 mg Oral DAILY    aspirin tablet 325 mg  325 mg Oral DAILY    carvediloL (COREG) tablet 25 mg  25 mg Oral BID WITH MEALS    diclofenac (VOLTAREN) 1 % topical gel 2 g  2 g Topical DAILY    DULoxetine (CYMBALTA) capsule 20 mg  20 mg Oral BID    hydrALAZINE (APRESOLINE) tablet 50 mg  50 mg Oral TID    arformoterol 15 mcg/budesonide 0.5 mg neb solution   Nebulization BID RT    montelukast (SINGULAIR) tablet 10 mg  10 mg Oral QHS    rosuvastatin (CRESTOR) tablet 10 mg  10 mg Oral DAILY    ondansetron (ZOFRAN ODT) tablet 4 mg  4 mg Oral Q8H PRN    acetaminophen (TYLENOL) tablet 650 mg  650 mg Oral Q6H PRN    glucose chewable tablet 16 g  4 Tablet Oral PRN    glucagon (GLUCAGEN) injection 1 mg  1 mg IntraMUSCular PRN    dextrose 10 % infusion 0-250 mL  0-250 mL IntraVENous PRN    heparin (porcine) injection 5,000 Units  5,000 Units SubCUTAneous Q12H    furosemide (LASIX) tablet 80 mg  80 mg Oral BID    insulin lispro (HUMALOG) injection   SubCUTAneous AC&HS    insulin glargine (LANTUS) injection 15 Units  15 Units SubCUTAneous DAILY    gentamicin (GARAMYCIN) 0.1 % cream   Topical DIALYSIS PRN    peritoneal dialysis DEXTROSE 2.5% (2.5 mEq/L low calcium) solution 6,000 mL  6,000 mL IntraPERitoneal DAILY     ______________________________________________________________________  EXPECTED LENGTH OF STAY: - - -  ACTUAL LENGTH OF STAY:          [address] Nicole Leon MD

## 2023-03-04 NOTE — PROGRESS NOTES
[facility]   [address] [address] [address] [facility]  Phone: [phone]   [phone]    [email or web address]     Nephrology Progress Note    Patient Name : Roque Abdi      : 1955     MRN : [id number]  Date of Admission : 3/3/2023  Date of Servive : 23    CC: Follow up for ESRD       Assessment and Plan   ESRD-PD :  -[facility], [facility], Dr. Marva Romero  -cont CCPD , substitute Dianeal for extraneal   -PD fluid cell count revealed elevated WBC predominantly PMN, consistent w/ peritonitis  -Pending PD Gram stain and culture  -on empiric Vanco/Rocephin  -Daily labs    Volume overloaded  -Intensify UF with PD (using 4.25% on top of 2.5% PD fluid)  -Cont Lasix 80 IV BID    Hypokalemia: Chronic  Hypomagnesemia  -repleted accordingly    Anemia in CKD  -Hb 7.7  -Tsat 16, no ferritin  -Started MADY    S/p GLF  RLE cellulitis  -Consult Ortho    DM2  -MX per primary team    HTN  -Continue home meds    CAD  HFpEF  GERD       Interval History:  Pt feeling well, no abd pain, n/v, fevers. UF in PD 56cc    Mrs. Romero is a 78-year-old -American woman history of ESR of 2/2 DM HTN and has been dialysis dependent. She switched to PD approximately 8 months ago and is followed by Dr. Marva Romero. Prior to that she was followed by Dr. Raul Rowell. Recently she had a repeat PET study  She has chronic hypokalemia since January but is unable to feel potassium prescription. She presented to ER after a fall and worsening RLE pain and swelling. She is having difficulty weightbearing. In the ER she had a head CT that showed bilateral intraparotid nodules. She had x-rays that rule out any fracture    With regards to her PD, she reports 1 day of cloudy fluid last week. She did not get PD fluid cell count or Gram stain at the time. She completed PD yesterday during the day prior to coming to the ER and reports clear fluid.       Review of Systems: A comprehensive review of systems was negative except for that written in the HPI.     Current Medications:   Current Facility-Administered Medications   Medication Dose Route Frequency    cefTRIAXone (ROCEPHIN) 2 g in 0.9% sodium chloride 20 mL IV syringe  2 g IntraVENous Q24H    vancomycin - pharmacy to dose    Other Rx Dosing/Monitoring    peritoneal dialysis DEXTROSE 4.25% (2.5 mEq/L low calcium) solution 6,000 mL  6,000 mL IntraPERitoneal DAILY    peritoneal dialysis DEXTROSE 2.5% (2.5 mEq/L low calcium) solution 6,000 mL  6,000 mL IntraPERitoneal DAILY    epoetin joni-epbx (RETACRIT) injection 20,000 Units  20,000 Units SubCUTAneous Q TUE, THU & SAT    potassium bicarb-citric acid (EFFER-K) tablet 40 mEq  40 mEq Oral DAILY    magnesium oxide (MAG-OX) tablet 400 mg  400 mg Oral DAILY    morphine injection 2 mg  2 mg IntraVENous Q3H PRN    oxyCODONE IR (ROXICODONE) tablet 5 mg  5 mg Oral Q6H PRN    albuterol-ipratropium (DUO-NEB) 2.5 MG-0.5 MG/3 ML  3 mL Nebulization Q6H PRN    amLODIPine (NORVASC) tablet 10 mg  10 mg Oral DAILY    aspirin tablet 325 mg  325 mg Oral DAILY    carvediloL (COREG) tablet 25 mg  25 mg Oral BID WITH MEALS    diclofenac (VOLTAREN) 1 % topical gel 2 g  2 g Topical DAILY    DULoxetine (CYMBALTA) capsule 20 mg  20 mg Oral BID    hydrALAZINE (APRESOLINE) tablet 50 mg  50 mg Oral TID    arformoterol 15 mcg/budesonide 0.5 mg neb solution   Nebulization BID RT    montelukast (SINGULAIR) tablet 10 mg  10 mg Oral QHS    rosuvastatin (CRESTOR) tablet 10 mg  10 mg Oral DAILY    ondansetron (ZOFRAN ODT) tablet 4 mg  4 mg Oral Q8H PRN    acetaminophen (TYLENOL) tablet 650 mg  650 mg Oral Q6H PRN    glucose chewable tablet 16 g  4 Tablet Oral PRN    glucagon (GLUCAGEN) injection 1 mg  1 mg IntraMUSCular PRN    dextrose 10 % infusion 0-250 mL  0-250 mL IntraVENous PRN    heparin (porcine) injection 5,000 Units  5,000 Units SubCUTAneous Q12H    furosemide (LASIX) tablet 80 mg  80 mg Oral BID    insulin lispro (HUMALOG) injection SubCUTAneous AC&HS    insulin glargine (LANTUS) injection 15 Units  15 Units SubCUTAneous DAILY    gentamicin (GARAMYCIN) 0.1 % cream   Topical DIALYSIS PRN    peritoneal dialysis DEXTROSE 2.5% (2.5 mEq/L low calcium) solution 6,000 mL  6,000 mL IntraPERitoneal DAILY      Allergies   Allergen Reactions    Ivp Dye [Fd And C Blue No.1] Hives       Objective:  Vitals:    Vitals:    03/04/23 0547 03/04/23 0756 03/04/23 0826 03/04/23 1312   BP:   138/73 126/68   Pulse: 75  93 91   Resp:   14 16   Temp:   98.7 °F (37.1 °C) 99.4 °F (37.4 °C)   SpO2:  100% 93% 97%   Weight:         Intake and Output:  No intake/output data recorded. 03/02 1901 - 03/04 0700  In: -   Out: 814     Physical Examination:        General: Obese  Neck:  Supple, no mass  Resp:  Diminished at bases   CV:  RRR,  no murmur or rub, pitting R>L LE edema  GI:  PD catheter exit site clean  Neurologic:  Non focal  Psych:             AAO x 3 appropriate affect   Extremities: RLE tenderness around knee with bruising and swelling  :  Victor + / -      []    High complexity decision making was performed  []    Patient is at high-risk of decompensation with multiple organ involvement    Lab Data Personally Reviewed: I have reviewed all the pertinent labs, microbiology data and radiology studies during assessment.     Recent Labs     03/04/23  0420 03/03/23  1644 03/03/23  0532    138 138   K 3.7 3.4* 2.6*   CL 96* 97 94*   CO2 35* 35* 33*   * 150* 145*   BUN 27* 31* 30*   CREA 5.03* 5.65* 5.66*   CA 7.4* 6.6* 7.2*   MG 2.2  --  2.0   PHOS 4.2 4.4  --    ALB 1.8* 1.6* 2.0*   ALT  --   --  9*     Recent Labs     03/04/23  0420 03/03/23  0532   WBC 8.6 10.3   HGB 7.7* 8.3*   HCT 25.8* 27.0*    409*     No results found for: SDES  Lab Results   Component Value Date/Time    Culture result: PENDING 03/03/2023 05:26 PM    Culture result: MIXED UROGENITAL STANLEY ISOLATED 05/17/2022 09:52 AM    Culture result: NO GROWTH 5 DAYS 05/15/2022 03:51 AM Culture result: NO GROWTH 5 DAYS 03/15/2018 06:09 PM     Recent Results (from the past 24 hour(s))   GLUCOSE, POC    Collection Time: 03/03/23  3:36 PM   Result Value Ref Range    Glucose (POC) 161 (H) 65 - 117 mg/dL    Performed by Hipolito OCAMPO    RENAL FUNCTION PANEL    Collection Time: 03/03/23  4:44 PM   Result Value Ref Range    Sodium 138 136 - 145 mmol/L    Potassium 3.4 (L) 3.5 - 5.1 mmol/L    Chloride 97 97 - 108 mmol/L    CO2 35 (H) 21 - 32 mmol/L    Anion gap 6 5 - 15 mmol/L    Glucose 150 (H) 65 - 100 mg/dL    BUN 31 (H) 6 - 20 MG/DL    Creatinine 5.65 (H) 0.55 - 1.02 MG/DL    BUN/Creatinine ratio 5 (L) 12 - 20      eGFR 8 (L) >60 ml/min/1.73m2    Calcium 6.6 (L) 8.5 - 10.1 MG/DL    Phosphorus 4.4 2.6 - 4.7 MG/DL    Albumin 1.6 (L) 3.5 - 5.0 g/dL   HEP B SURFACE AG    Collection Time: 03/03/23  4:44 PM   Result Value Ref Range    Hepatitis B surface Ag <0.10 Index    Hep B surface Ag Interp. Negative NEG     HEP B SURFACE AB    Collection Time: 03/03/23  4:44 PM   Result Value Ref Range    Hepatitis B surface Ab <3.10 mIU/mL    Hep B surface Ab Interp.  NONREACTIVE NR     CULTURE, BODY FLUID W GRAM STAIN    Collection Time: 03/03/23  5:26 PM    Specimen: Peritoneal Dialy Fld; Body Fluid   Result Value Ref Range    Special Requests: NO SPECIAL REQUESTS      GRAM STAIN NO WBC'S SEEN      GRAM STAIN NO ORGANISMS SEEN      Culture result: PENDING    GLUCOSE, POC    Collection Time: 03/03/23 11:33 PM   Result Value Ref Range    Glucose (POC) 322 (H) 65 - 117 mg/dL    Performed by Hardeep ospina RN    RENAL FUNCTION PANEL    Collection Time: 03/04/23  4:20 AM   Result Value Ref Range    Sodium 138 136 - 145 mmol/L    Potassium 3.7 3.5 - 5.1 mmol/L    Chloride 96 (L) 97 - 108 mmol/L    CO2 35 (H) 21 - 32 mmol/L    Anion gap 7 5 - 15 mmol/L    Glucose 211 (H) 65 - 100 mg/dL    BUN 27 (H) 6 - 20 MG/DL    Creatinine 5.03 (H) 0.55 - 1.02 MG/DL    BUN/Creatinine ratio 5 (L) 12 - 20      eGFR 9 (L) >60 ml/min/1.73m2    Calcium 7.4 (L) 8.5 - 10.1 MG/DL    Phosphorus 4.2 2.6 - 4.7 MG/DL    Albumin 1.8 (L) 3.5 - 5.0 g/dL   MAGNESIUM    Collection Time: 03/04/23  4:20 AM   Result Value Ref Range    Magnesium 2.2 1.6 - 2.4 mg/dL   CBC WITH AUTOMATED DIFF    Collection Time: 03/04/23  4:20 AM   Result Value Ref Range    WBC 8.6 3.6 - 11.0 K/uL    RBC 2.58 (L) 3.80 - 5.20 M/uL    HGB 7.7 (L) 11.5 - 16.0 g/dL    HCT 25.8 (L) 35.0 - 47.0 %    .0 (H) 80.0 - 99.0 FL    MCH 29.8 26.0 - 34.0 PG    MCHC 29.8 (L) 30.0 - 36.5 g/dL    RDW 15.4 (H) 11.5 - 14.5 %    PLATELET 329 799 - 270 K/uL    MPV 9.3 8.9 - 12.9 FL    NRBC 0.0 0  WBC    ABSOLUTE NRBC 0.00 0.00 - 0.01 K/uL    NEUTROPHILS 68 32 - 75 %    LYMPHOCYTES 14 12 - 49 %    MONOCYTES 11 5 - 13 %    EOSINOPHILS 5 0 - 7 %    BASOPHILS 1 0 - 1 %    IMMATURE GRANULOCYTES 1 (H) 0.0 - 0.5 %    ABS. NEUTROPHILS 5.9 1.8 - 8.0 K/UL    ABS. LYMPHOCYTES 1.2 0.8 - 3.5 K/UL    ABS. MONOCYTES 1.0 0.0 - 1.0 K/UL    ABS. EOSINOPHILS 0.4 0.0 - 0.4 K/UL    ABS. BASOPHILS 0.1 0.0 - 0.1 K/UL    ABS. IMM. GRANS. 0.1 (H) 0.00 - 0.04 K/UL    DF AUTOMATED     HEMOGLOBIN A1C WITH EAG    Collection Time: 03/04/23  4:20 AM   Result Value Ref Range    Hemoglobin A1c 6.4 (H) 4.0 - 5.6 %    Est. average glucose 137 mg/dL   GLUCOSE, POC    Collection Time: 03/04/23  7:41 AM   Result Value Ref Range    Glucose (POC) 151 (H) 65 - 117 mg/dL    Performed by Meredith Valerio    GLUCOSE, POC    Collection Time: 03/04/23 12:11 PM   Result Value Ref Range    Glucose (POC) 149 (H) 65 - 117 mg/dL    Performed by Lizet Harmon RN            I have reviewed the flowsheets. Chart and Pertinent Notes have been reviewed. No change in PMH ,family and social history from Consult note.       Yanira Espinoza [facility]

## 2023-03-04 NOTE — DIALYSIS
Peritoneal Dialysis Disconnection / [phone]     Metrics   I-Drain: 797 ml   Total UF: 1017 ml   Last Fill: 2000 ml (pt stated she had 1000 ml from home)   Last Manual Drain: 0 ml   Net UF:         814 ml   Avg Dwell Time: 1 hr 17 min   Lost Dwell Time: 24  min   Alarms: N/a   Effluent: Clear/yellow     Access   Type & Location: LLQ abd pd catheter   Comments: Prior to disconnection, one-minute Alcavis scrub performed. Sterile mini cap applied and secured to abdomen. Dsg clean, dry and intact. Dsg date: 03/03/23                                         Safety:   Primary Nurse Rpt Pre: AMOS Dominguez RN   Primary Nurse Rpt Post: AMOS Dominguze RN     Comments:   Justa Moraes RN at bedside to disconnect pt from CCPD tx. Orders, consent, pt, and code status confirmed. Tx completed. Used cassette and bags discarded. Education and pre/post report given to primary RN.

## 2023-03-04 NOTE — PROGRESS NOTES
Problem: Pressure Injury - Risk of  Goal: *Prevention of pressure injury  Description: Document Waldo Scale and appropriate interventions in the flowsheet.   Outcome: Progressing Towards Goal  Note: Pressure Injury Interventions:       Moisture Interventions: Internal/External urinary devices    Activity Interventions: Increase time out of bed    Mobility Interventions: Float heels, PT/OT evaluation    Nutrition Interventions: Offer support with meals,snacks and hydration

## 2023-03-04 NOTE — PROGRESS NOTES
Pharmacist Note - Vancomycin Dosing    Consult provided for this 75 y.o. female for indication of SSTI. Antibiotic regimen(s): vancomycin + ceftriaxone  Patient on vancomycin PTA? YES - 2000 mg on 3/3 at 8:09     Recent Labs     23  0420 23  1644 23  0532   WBC 8.6  --  10.3   CREA 5.03* 5.65* 5.66*   BUN 27* 31* 30*     Frequency of BMP: daily   Height: 165.1 cm  Weight: 92.5 kg  Est CrCl: ESRD on PD  Temp (24hrs), Av.1 °F (36.7 °C), Min:98 °F (36.7 °C), Max:98.1 °F (36.7 °C)    Cultures:  3/3 blood - NG, preliminary   3/3 peritoneal fluid - NG, preliminary     MRSA Swab ordered (if applicable)? N/A    The plan below is expected to result in a target range of Trough 10-15 mcg/mL    Therapy was initiated with a loading dose of 2000 mg IV x 1. Pharmacy to follow patient daily and order levels / make dose adjustments as appropriate. *Vancomycin has been dosed used Bayesian kinetics software to target an AUC/SANTIAGO of 400-600, which provides adequate exposure for an assumed infection due to MRSA with an SANTIAGO of 1 or less while reducing the risk of nephrotoxicity as seen with traditional trough based dosing goals.

## 2023-03-04 NOTE — PROGRESS NOTES
Problem: Falls - Risk of  Goal: *Absence of Falls  Description: Document Lucy López Fall Risk and appropriate interventions in the flowsheet.   Outcome: Progressing Towards Goal  Note: Fall Risk Interventions:     Problem: Pain  Goal: *Control of Pain  Outcome: Progressing Towards Goal  Note:   Assess pain characteristics (eg: Intensity scale; onset; location; quality; severity; duration; frequency;   radiation)  Assess pain management - barriers (eg: Past pain   experiences)  Identify pain expectations (eg: Patient's pain goal; somatic experiences; behavioral changes;   affect)  Identify pain medication concerns (eg: Cultural considerations; addiction   concerns)  Support system identification (eg: Caregiver; community resource; family; friends; Yazdanism; support   group)  Monitor for change in patient condition (eg: Vital signs changes; changes in level of consciousness; nausea; behavioral   changes)  Medication side-effect   assessment  Pain-relief response reassessment (eg: Frequency based on route of administration;   effectiveness)     Problem: Patient Education: Go to Patient Education Activity  Goal: Patient/Family Education  Outcome: Progressing Towards Goal

## 2023-03-04 NOTE — PROGRESS NOTES
TRANSFER - IN REPORT:    Verbal report received from Ryan Dubin (name) on [employer]  being received from ED (unit) for routine progression of care      Report consisted of patients Situation, Background, Assessment and   Recommendations(SBAR). Information from the following report(s) SBAR, Kardex, ED Summary, MAR, and Recent Results was reviewed with the receiving nurse. Opportunity for questions and clarification was provided. Assessment completed upon patients arrival to unit and care assumed.

## 2023-03-04 NOTE — PROGRESS NOTES
5 - spoke with Josemanuel Wiley RN regarding pt having a room, however, pt was just placed on PD cycle at [phone]. Pt will move to room when Aleena Mason gets to [facility] to assist with transfer.

## 2023-03-04 NOTE — DIALYSIS
Satish Diss  /  [phone]     Orders  Therapy Time: 9.5 hrs   Cycles: 5   Fill Volume: 2300 mL   Last Fill Volume: 2000 mL   Total Volume: 13,500 mL   Solution: (Bag 1) 4.25 % Dextrose Dianeal   (Bag 2&3) 4.25 % Dextrose Dianeal          Comments:   Pt orders, notes, labs, code status and consent reviewed. Time out complete. PD site care: Catheter insertion site cleansed with Exsept followed by Gentamycin application and dry gauze dressing. No redness or drainage at exit site. Prior to connection, one minute Alcavis scrub performed, followed by one minute soak. Start time: 1905  Estimated End Time: 2736    Remained present during initial drain followed by initiation of first fill. Low drain volume alarms for initial drain. Alma Xie NP notified, plan to bypass intitial drain and to add heparin to bags 2 and 3 tonight. Prior to departure, bed in lowest position, call bell and personal belongings within reach. Education pre/post with patient and primary nurse, DIANE Palomino RN.

## 2023-03-05 LAB
ALBUMIN SERPL-MCNC: 1.3 G/DL (ref 3.5–5)
ANION GAP SERPL CALC-SCNC: 8 MMOL/L (ref 5–15)
BASOPHILS # BLD: 0 K/UL (ref 0–0.1)
BASOPHILS NFR BLD: 1 % (ref 0–1)
BUN SERPL-MCNC: 23 MG/DL (ref 6–20)
BUN/CREAT SERPL: 5 (ref 12–20)
CALCIUM SERPL-MCNC: 6.2 MG/DL (ref 8.5–10.1)
CHLORIDE SERPL-SCNC: 101 MMOL/L (ref 97–108)
CO2 SERPL-SCNC: 31 MMOL/L (ref 21–32)
CREAT SERPL-MCNC: 4.36 MG/DL (ref 0.55–1.02)
DIFFERENTIAL METHOD BLD: ABNORMAL
EOSINOPHIL # BLD: 0.5 K/UL (ref 0–0.4)
EOSINOPHIL NFR BLD: 6 % (ref 0–7)
ERYTHROCYTE [DISTWIDTH] IN BLOOD BY AUTOMATED COUNT: 15.2 % (ref 11.5–14.5)
GLUCOSE BLD STRIP.AUTO-MCNC: 151 MG/DL (ref 65–117)
GLUCOSE BLD STRIP.AUTO-MCNC: 179 MG/DL (ref 65–117)
GLUCOSE BLD STRIP.AUTO-MCNC: 196 MG/DL (ref 65–117)
GLUCOSE BLD STRIP.AUTO-MCNC: 244 MG/DL (ref 65–117)
GLUCOSE SERPL-MCNC: 138 MG/DL (ref 65–100)
HCT VFR BLD AUTO: 23.8 % (ref 35–47)
HCT VFR BLD AUTO: 24.5 % (ref 35–47)
HGB BLD-MCNC: 7 G/DL (ref 11.5–16)
HGB BLD-MCNC: 7.3 G/DL (ref 11.5–16)
IMM GRANULOCYTES # BLD AUTO: 0 K/UL (ref 0–0.04)
IMM GRANULOCYTES NFR BLD AUTO: 0 % (ref 0–0.5)
LYMPHOCYTES # BLD: 1.3 K/UL (ref 0.8–3.5)
LYMPHOCYTES NFR BLD: 18 % (ref 12–49)
MAGNESIUM SERPL-MCNC: 1.9 MG/DL (ref 1.6–2.4)
MCH RBC QN AUTO: 29.4 PG (ref 26–34)
MCHC RBC AUTO-ENTMCNC: 29.4 G/DL (ref 30–36.5)
MCV RBC AUTO: 100 FL (ref 80–99)
MONOCYTES # BLD: 0.8 K/UL (ref 0–1)
MONOCYTES NFR BLD: 11 % (ref 5–13)
NEUTS SEG # BLD: 4.9 K/UL (ref 1.8–8)
NEUTS SEG NFR BLD: 64 % (ref 32–75)
NRBC # BLD: 0 K/UL (ref 0–0.01)
NRBC BLD-RTO: 0 PER 100 WBC
PHOSPHATE SERPL-MCNC: 4.1 MG/DL (ref 2.6–4.7)
PLATELET # BLD AUTO: 352 K/UL (ref 150–400)
PMV BLD AUTO: 9.1 FL (ref 8.9–12.9)
POTASSIUM SERPL-SCNC: 3.1 MMOL/L (ref 3.5–5.1)
RBC # BLD AUTO: 2.38 M/UL (ref 3.8–5.2)
SERVICE CMNT-IMP: ABNORMAL
SODIUM SERPL-SCNC: 140 MMOL/L (ref 136–145)
WBC # BLD AUTO: 7.6 K/UL (ref 3.6–11)

## 2023-03-05 PROCEDURE — 85018 HEMOGLOBIN: CPT

## 2023-03-05 PROCEDURE — 82962 GLUCOSE BLOOD TEST: CPT

## 2023-03-05 PROCEDURE — 74011636637 HC RX REV CODE- 636/637: Performed by: CLINICAL NURSE SPECIALIST

## 2023-03-05 PROCEDURE — 51798 US URINE CAPACITY MEASURE: CPT

## 2023-03-05 PROCEDURE — 74011250637 HC RX REV CODE- 250/637: Performed by: NURSE PRACTITIONER

## 2023-03-05 PROCEDURE — 74011250636 HC RX REV CODE- 250/636: Performed by: NURSE PRACTITIONER

## 2023-03-05 PROCEDURE — 74011000250 HC RX REV CODE- 250: Performed by: NURSE PRACTITIONER

## 2023-03-05 PROCEDURE — 94760 N-INVAS EAR/PLS OXIMETRY 1: CPT

## 2023-03-05 PROCEDURE — 83735 ASSAY OF MAGNESIUM: CPT

## 2023-03-05 PROCEDURE — 85025 COMPLETE CBC W/AUTO DIFF WBC: CPT

## 2023-03-05 PROCEDURE — A4726 DIALYS SOL FLD VOL > 5999CC: HCPCS | Performed by: NURSE PRACTITIONER

## 2023-03-05 PROCEDURE — 36415 COLL VENOUS BLD VENIPUNCTURE: CPT

## 2023-03-05 PROCEDURE — 74011250636 HC RX REV CODE- 250/636: Performed by: STUDENT IN AN ORGANIZED HEALTH CARE EDUCATION/TRAINING PROGRAM

## 2023-03-05 PROCEDURE — 80069 RENAL FUNCTION PANEL: CPT

## 2023-03-05 PROCEDURE — 94640 AIRWAY INHALATION TREATMENT: CPT

## 2023-03-05 PROCEDURE — 65270000046 HC RM TELEMETRY

## 2023-03-05 PROCEDURE — 74011000250 HC RX REV CODE- 250: Performed by: STUDENT IN AN ORGANIZED HEALTH CARE EDUCATION/TRAINING PROGRAM

## 2023-03-05 PROCEDURE — 74011250637 HC RX REV CODE- 250/637: Performed by: INTERNAL MEDICINE

## 2023-03-05 RX ORDER — GENTAMICIN SULFATE 1 MG/G
CREAM TOPICAL 2 TIMES DAILY
Status: DISCONTINUED | OUTPATIENT
Start: 2023-03-05 | End: 2023-03-07

## 2023-03-05 RX ORDER — POTASSIUM CHLORIDE 750 MG/1
20 TABLET, FILM COATED, EXTENDED RELEASE ORAL
Status: COMPLETED | OUTPATIENT
Start: 2023-03-05 | End: 2023-03-05

## 2023-03-05 RX ORDER — POLYETHYLENE GLYCOL 3350 17 G/17G
17 POWDER, FOR SOLUTION ORAL DAILY
Status: DISCONTINUED | OUTPATIENT
Start: 2023-03-05 | End: 2023-03-06

## 2023-03-05 RX ORDER — AMOXICILLIN 250 MG
1 CAPSULE ORAL DAILY
Status: DISCONTINUED | OUTPATIENT
Start: 2023-03-05 | End: 2023-03-08 | Stop reason: HOSPADM

## 2023-03-05 RX ADMIN — DOCUSATE SODIUM 50 MG AND SENNOSIDES 8.6 MG 1 TABLET: 8.6; 5 TABLET, FILM COATED ORAL at 10:10

## 2023-03-05 RX ADMIN — ROSUVASTATIN 10 MG: 10 TABLET, FILM COATED ORAL at 10:10

## 2023-03-05 RX ADMIN — CARVEDILOL 25 MG: 12.5 TABLET, FILM COATED ORAL at 10:10

## 2023-03-05 RX ADMIN — OXYCODONE HYDROCHLORIDE 5 MG: 5 TABLET ORAL at 07:01

## 2023-03-05 RX ADMIN — ASPIRIN 325 MG ORAL TABLET 325 MG: 325 PILL ORAL at 10:09

## 2023-03-05 RX ADMIN — MAGNESIUM OXIDE 400 MG (241.3 MG MAGNESIUM) TABLET 400 MG: TABLET at 10:10

## 2023-03-05 RX ADMIN — DICLOFENAC 2 G: 10 GEL TOPICAL at 10:12

## 2023-03-05 RX ADMIN — HEPARIN SODIUM 5000 UNITS: 5000 INJECTION INTRAVENOUS; SUBCUTANEOUS at 14:53

## 2023-03-05 RX ADMIN — OXYCODONE HYDROCHLORIDE 5 MG: 5 TABLET ORAL at 15:03

## 2023-03-05 RX ADMIN — OXYCODONE HYDROCHLORIDE 5 MG: 5 TABLET ORAL at 21:56

## 2023-03-05 RX ADMIN — DULOXETINE HYDROCHLORIDE 20 MG: 20 CAPSULE, DELAYED RELEASE ORAL at 10:09

## 2023-03-05 RX ADMIN — HEPARIN SODIUM: 1000 INJECTION INTRAVENOUS; SUBCUTANEOUS at 16:34

## 2023-03-05 RX ADMIN — HEPARIN SODIUM: 1000 INJECTION INTRAVENOUS; SUBCUTANEOUS at 16:33

## 2023-03-05 RX ADMIN — INSULIN GLARGINE 15 UNITS: 100 INJECTION, SOLUTION SUBCUTANEOUS at 10:11

## 2023-03-05 RX ADMIN — POLYETHYLENE GLYCOL 3350 17 G: 17 POWDER, FOR SOLUTION ORAL at 10:12

## 2023-03-05 RX ADMIN — DULOXETINE HYDROCHLORIDE 20 MG: 20 CAPSULE, DELAYED RELEASE ORAL at 17:38

## 2023-03-05 RX ADMIN — FUROSEMIDE 80 MG: 40 TABLET ORAL at 10:09

## 2023-03-05 RX ADMIN — CEFTRIAXONE 2 G: 2 INJECTION, POWDER, FOR SOLUTION INTRAMUSCULAR; INTRAVENOUS at 10:11

## 2023-03-05 RX ADMIN — AMLODIPINE BESYLATE 10 MG: 5 TABLET ORAL at 10:10

## 2023-03-05 RX ADMIN — POTASSIUM CHLORIDE 20 MEQ: 750 TABLET, FILM COATED, EXTENDED RELEASE ORAL at 10:10

## 2023-03-05 RX ADMIN — POTASSIUM BICARBONATE 40 MEQ: 782 TABLET, EFFERVESCENT ORAL at 10:12

## 2023-03-05 RX ADMIN — GENTAMICIN SULFATE: 1 CREAM TOPICAL at 16:32

## 2023-03-05 RX ADMIN — ARFORMOTEROL TARTRATE: 15 SOLUTION RESPIRATORY (INHALATION) at 07:33

## 2023-03-05 RX ADMIN — MONTELUKAST 10 MG: 10 TABLET, FILM COATED ORAL at 21:55

## 2023-03-05 RX ADMIN — HYDRALAZINE HYDROCHLORIDE 50 MG: 50 TABLET, FILM COATED ORAL at 10:11

## 2023-03-05 RX ADMIN — GENTAMICIN SULFATE: 1 CREAM TOPICAL at 14:52

## 2023-03-05 RX ADMIN — Medication 2 UNITS: at 22:18

## 2023-03-05 NOTE — PROGRESS NOTES
Orthopedic Progress Note    S: Pain unchanged, comfortable at rest, still endorses minimal ability to flex, has not attempted to ambulate; Denies numbness, tingling, focal weakness, cp, sob, fever, chills    O: NAD, respirations unlabored; no significant change from exam yesterday; ttp anterior knee, dry scab, minimal erythema, no palpable effusion, compartments soft. NVI. Patient Vitals for the past 4 hrs:   Temp Pulse BP   03/04/23 1826 97.9 °F (36.6 °C) 87 128/85     Recent Labs     03/04/23  0420 03/03/23  1644 03/03/23  0532   HGB 7.7*  --  8.3*   HCT 25.8*  --  27.0*     --  409*   BUN 27* 31* 30*   CREA 5.03* 5.65* 5.66*   K 3.7 3.4* 2.6*    138 138            A/P:  Still not concerned for infectious bursitis or arthritis. If bursa is infected abx would be first line therapy which patient is on for cellulitis. Encourage ambulation with knee immobilizer. Elevate, Ice, Ace wrap lower leg to help with swelling. MRI ordered to help define any further soft tissue injuries related to fall. Will continue to monitor.      JAZ Laura  Orthopedic Trauma Service  [address]

## 2023-03-05 NOTE — PROGRESS NOTES
Orthopedic Progress Note    S: Pain mildly improved, ROM improved; \"I can get up and walk\"; Denies numbness, tingling, focal weakness, cp, sob, fever, chills    O: NAD, respirations unlabored; swelling decreased, erythema decreased; ttp anterior knee, dry scab, compartments soft. NVI. Patient Vitals for the past 4 hrs:   Temp Pulse BP   03/05/23 1215 98.4 °F (36.9 °C) 86 125/79   03/05/23 1157 -- 89 --     Recent Labs     03/05/23  0307 03/04/23  0420   HGB 7.0* 7.7*   HCT 23.8* 25.8*    364   BUN 23* 27*   CREA 4.36* 5.03*   K 3.1* 3.7    138       CT LOW EXT RT WO CONT  Narrative: EXAM: CT LOW EXT RT WO CONT    INDICATION: RLE pain; swelling, erythema, pain - no DVT per duplex study, s/p  fall     COMPARISON: CT abdomen and pelvis 12/2/2021, right knee radiographs 3/3/2023    TECHNIQUE: Helical CT of the right lower extremity with coronal and sagittal  reformats. Images reviewed in soft tissue and bone windows. CT dose reduction  was achieved through the use of a standardized protocol tailored for this  examination and automatic exposure control for dose modulation. CONTRAST: None. FINDINGS: Bones:  No fracture, dislocation, or periosteal reaction. Joint fluid: Moderate knee joint effusion    Articulations: Moderate lower lumbosacral facet arthropathy. Mild bilateral  sacroiliac joint osteoarthritic. Multiphasic symphysis arthropathy. Moderate  right hip joint osteoarthritis. Mild-to-moderate tricompartmental right knee  osteoarthritis, most pronounced laterally. Mild to moderate osteophytes  involving the visualized foot and ankle. Tendons: No full-thickness tendon tear. Muscles: No intramuscular hematoma. No focal atrophy. Partially visualized large  predominately fat signal lesion in the inseparable from the superficial aspect  of the right iliopsoas muscle, which has increased in AP dimension since  6/8/2011 exam (currently 8.2 cm, previously 4.9 cm).  Fat density lesions within  the long head biceps femoris and vastus intermedius muscles. Soft tissues: Diffuse skin thickening and soft tissue edema and swelling. An  approximately 13.1 cm x 9.3 cm x 1.2 cm lobulated hyperdense focus in the soft  tissues anterior to the patella and patellar tendon (3-230, 602-85), concerning  for hematoma. Other: Atherosclerosis. Partially visualized aortobiiliac stent graft. Peritoneal dialysis catheter. Small volume ascites. Hysterectomy. Fat-containing  right intra-abdominal hernia  Impression: 1. Diffuse soft tissue swelling of the right lower extremity. Suspected large  hematoma anterior to the patella and patellar tendon. 2.  Large predominantly fat density lesion in the right iliopsoas compartment  which is increased in size since 6/8/2011. This has no definite concerning  features on noncontrast CT and may simply reflect a lipoma, but given its  location, low-grade liposarcoma is a consideration. Additional fat density  lesions in the long head biceps femoris and vastus lateralis and intermedius  muscles. Follow-up as clinically indicated. DUPLEX LOWER EXT VENOUS RIGHT  · No evidence of DVT in the right lower extremity     CT HEAD WO CONT  Narrative: INDICATION:   fall, head trauma    EXAMINATION:  CT HEAD WO CONTRAST    COMPARISON:  None    TECHNIQUE:  Routine noncontrast axial head CT was performed. Sagittal and  coronal reconstructions were generated. CT dose reduction was achieved through  use of a standardized protocol tailored for this examination and automatic  exposure control for dose modulation. FINDINGS:    Ventricles:  Midline, no hydrocephalus. Intracranial Hemorrhage:  None. Brain Parenchyma/Brainstem:  Normal for age. Basal Cisterns:  Normal.   Paranasal Sinuses:  Visualized sinuses are clear. Soft Tissues:  No significant soft tissue swelling. Osseous Structures:  No acute fracture.   Additional Comments:  Enlarged bilateral intraparotid nodules partly visualized  measuring 18 mm bilaterally. Impression: 1. No acute traumatic injury. 2. Enlarged bilateral intraparotid nodules 18 mm partly visualized, consider  nonemergent CT neck. XR CHEST PA LAT  Narrative: INDICATION:   fall    COMPARISON: October 19, 2022    FINDINGS:    Frontal and lateral views of the chest demonstrate a normal cardiomediastinal  silhouette. The lungs are adequately expanded. Pulmonary vascular congestion. No consolidation or pneumothorax. The osseous structures are unremarkable. Impression: Pulmonary vascular congestion. XR KNEE RT 3 V  Narrative: INDICATION: fall, pain    COMPARISON: None    FINDINGS:  3 views of the right knee demonstrate no acute fracture or subluxation. Tricompartmental degenerative changes. There is no joint effusion. Impression: No acute fracture. A/P:  Improving; Encourage ambulation with knee immobilizer, and ankle pumps  Elevate, Ice, Ace wrap lower leg to help with swelling. MRI still pending, not necessary for discharge, but I would like to confirm that she doesn't have patellar ligament tear.    F/U with Dr. Jeffrey Cabrales in a week for re-eval.     Daniel Granados, PA  Orthopedic Trauma Service  [address] [facility]

## 2023-03-05 NOTE — PROGRESS NOTES
Problem: Pain  Goal: *Control of Pain  Outcome: Progressing Towards Goal  Note:   Assess pain characteristics (eg: Intensity scale; onset; location; quality; severity; duration; frequency;   radiation)  Assess pain management - barriers (eg: Past pain   experiences)  Identify pain expectations (eg: Patient's pain goal; somatic experiences; behavioral changes;   affect)  Identify pain medication concerns (eg: Cultural considerations; addiction   concerns)  Support system identification (eg: Caregiver; community resource; family; friends; Jewish; support   group)  Monitor for change in patient condition (eg: Vital signs changes; changes in level of consciousness; nausea; behavioral   changes)  Medication side-effect   assessment  Pain-relief response reassessment (eg: Frequency based on route of administration

## 2023-03-05 NOTE — PROGRESS NOTES
PRIMITIVO- D/c to home with sister and son. Reason for Admission:   Right lower extremity pain and swelling. RUR Score:    17%              PCP: First and Last name:   Cristal Flores MD     Name of Practice:    Are you a current patient: Yes/No:    Approximate date of last visit:    Can you participate in a virtual visit if needed:     Do you (patient/family) have any concerns for transition/discharge? No                 Plan for utilizing home health:   TBD    Current Advanced Directive/Advance Care Plan:  Full Code      Healthcare Decision Maker:   Click here to complete [address] including selection of the Healthcare Decision Maker Relationship (ie \"Primary\")              Transition of Care Plan:    CM went to meet with pt , but she was asleep. CM called her daughter, Zhanna Gilman ([phone]) to introduce her to the role of CM and transition of care . This pt lives with her sister and her son in a one story home with 3 steps to enter. At baseline, this pt does not use any assistive device for ambulation. However, she has a walker and cane that she had used in the past. This pt does not drive, but relies on family for transportation. This pt is on Peritoneal Dialysis at home and her home unit is on [address]. CM will follow for d/c needs.  KLAUS Bolanos,ACM-SW

## 2023-03-05 NOTE — PROGRESS NOTES
Off shift report given to [facility] included SBAR, lab results, Kardex and pt. General condition.

## 2023-03-05 NOTE — PROGRESS NOTES
[facility]   [address] [facility], [address] [facility]  Phone: [phone]   [phone]    [email or web address]     Nephrology Progress Note    Patient Name : Ivette Sampson      : 1955     MRN : [id number]  Date of Admission : 3/3/2023  Date of Servive : 23    CC: Follow up for ESRD       Assessment and Plan   ESRD-PD :  -[facility], [facility], Dr. Roopa Sesay  -cont CCPD , substitute Dianeal for extraneal   -PD fluid cell count revealed elevated WBC predominantly PMN, consistent w/ peritonitis  -Pending PD Gram stain and culture  -on empiric Vanco/Rocephin  -Daily labs  -start bowel regimen to facilitate PD drainage; added Heparin to PD bags for concerns of fibrin clots   -Gentamicin cream on PD site    Volume overloaded  -Intensified UF with PD (using 4.25% on top of 2.5% PD fluid)  -Cont Lasix 80 IV BID    Hypokalemia: Chronic  Hypomagnesemia  -replete accordingly    Anemia in CKD  -Hb 7  -Tsat 16,ferritin 906 -- no IV iron  -Started MADY    S/p GLF  RLE cellulitis  -Consulted Ortho    DM2  -MX per primary team    HTN  -Continue home meds    CAD  HFpEF  GERD       Interval History:  Pt feeling well, no abd pain, n/v, fevers. UF in PD 1127cc + constipation    Mrs. Romero is a 69-year-old -American woman history of ESR of 2/2 DM HTN and has been dialysis dependent. She switched to PD approximately 8 months ago and is followed by Dr. Roopa Sesay. Prior to that she was followed by Dr. Aram Osler. Recently she had a repeat PET study  She has chronic hypokalemia since January but is unable to feel potassium prescription. She presented to ER after a fall and worsening RLE pain and swelling. She is having difficulty weightbearing. In the ER she had a head CT that showed bilateral intraparotid nodules. She had x-rays that rule out any fracture    With regards to her PD, she reports 1 day of cloudy fluid last week.   She did not get PD fluid cell count or Gram stain at the time. She completed PD yesterday during the day prior to coming to the ER and reports clear fluid. Review of Systems: A comprehensive review of systems was negative except for that written in the HPI.     Current Medications:   Current Facility-Administered Medications   Medication Dose Route Frequency    gentamicin (GARAMYCIN) 0.1 % cream   Topical BID    polyethylene glycol (MIRALAX) packet 17 g  17 g Oral DAILY    senna-docusate (PERICOLACE) 8.6-50 mg per tablet 1 Tablet  1 Tablet Oral DAILY    cefTRIAXone (ROCEPHIN) 2 g in 0.9% sodium chloride 20 mL IV syringe  2 g IntraVENous Q24H    vancomycin - pharmacy to dose    Other Rx Dosing/Monitoring    peritoneal dialysis DEXTROSE 2.5% (2.5 mEq/L low calcium) 6,000 mL with heparin (porcine) 3,000 Units   IntraPERitoneal DAILY    peritoneal dialysis DEXTROSE 2.5% (2.5 mEq/L low calcium) 6,000 mL with heparin (porcine) 3,000 Units   IntraPERitoneal DAILY    peritoneal dialysis DEXTROSE 4.25% (2.5 mEq/L low calcium) 6,000 mL with heparin (porcine) 3,000 Units   IntraPERitoneal QHS    epoetin joni-epbx (RETACRIT) injection 20,000 Units  20,000 Units SubCUTAneous Q TUE, THU & SAT    potassium bicarb-citric acid (EFFER-K) tablet 40 mEq  40 mEq Oral DAILY    magnesium oxide (MAG-OX) tablet 400 mg  400 mg Oral DAILY    morphine injection 2 mg  2 mg IntraVENous Q3H PRN    oxyCODONE IR (ROXICODONE) tablet 5 mg  5 mg Oral Q6H PRN    albuterol-ipratropium (DUO-NEB) 2.5 MG-0.5 MG/3 ML  3 mL Nebulization Q6H PRN    amLODIPine (NORVASC) tablet 10 mg  10 mg Oral DAILY    aspirin tablet 325 mg  325 mg Oral DAILY    carvediloL (COREG) tablet 25 mg  25 mg Oral BID WITH MEALS    diclofenac (VOLTAREN) 1 % topical gel 2 g  2 g Topical DAILY    DULoxetine (CYMBALTA) capsule 20 mg  20 mg Oral BID    hydrALAZINE (APRESOLINE) tablet 50 mg  50 mg Oral TID    arformoterol 15 mcg/budesonide 0.5 mg neb solution   Nebulization BID RT    montelukast (SINGULAIR) tablet 10 mg  10 mg Oral QHS rosuvastatin (CRESTOR) tablet 10 mg  10 mg Oral DAILY    ondansetron (ZOFRAN ODT) tablet 4 mg  4 mg Oral Q8H PRN    acetaminophen (TYLENOL) tablet 650 mg  650 mg Oral Q6H PRN    glucose chewable tablet 16 g  4 Tablet Oral PRN    glucagon (GLUCAGEN) injection 1 mg  1 mg IntraMUSCular PRN    dextrose 10 % infusion 0-250 mL  0-250 mL IntraVENous PRN    heparin (porcine) injection 5,000 Units  5,000 Units SubCUTAneous Q12H    furosemide (LASIX) tablet 80 mg  80 mg Oral BID    insulin lispro (HUMALOG) injection   SubCUTAneous AC&HS    insulin glargine (LANTUS) injection 15 Units  15 Units SubCUTAneous DAILY    gentamicin (GARAMYCIN) 0.1 % cream   Topical DIALYSIS PRN      Allergies   Allergen Reactions    Ivp Dye [Fd And C Blue No.1] Hives       Objective:  Vitals:    Vitals:    03/05/23 0558 03/05/23 0733 03/05/23 0743 03/05/23 0758   BP:   130/69    Pulse: 83  89 89   Resp:   18    Temp:   98.4 °F (36.9 °C)    SpO2:  98% 100%    Weight:         Intake and Output:  No intake/output data recorded. 03/03 1901 - 03/05 0700  In: -   Out: 814     Physical Examination:        General: Obese  Neck:  Supple, no mass  Resp:  Diminished at bases   CV:  RRR,  no murmur or rub, pitting R>L LE edema  GI:  PD catheter exit site clean  Neurologic:  Non focal  Psych:             AAO x 3 appropriate affect   Extremities: RLE tenderness around knee with bruising and swelling  :  Victor -      []    High complexity decision making was performed  []    Patient is at high-risk of decompensation with multiple organ involvement    Lab Data Personally Reviewed: I have reviewed all the pertinent labs, microbiology data and radiology studies during assessment.     Recent Labs     03/05/23  0307 03/04/23  0420 03/03/23  1644 03/03/23  0532    138 138 138   K 3.1* 3.7 3.4* 2.6*    96* 97 94*   CO2 31 35* 35* 33*   * 211* 150* 145*   BUN 23* 27* 31* 30*   CREA 4.36* 5.03* 5.65* 5.66*   CA 6.2* 7.4* 6.6* 7.2*   MG 1.9 2.2  -- 2. 0   PHOS 4.1 4.2 4.4  --    ALB 1.3* 1.8* 1.6* 2.0*   ALT  --   --   --  9*     Recent Labs     03/05/23  0307 03/04/23  0420 03/03/23  0532   WBC 7.6 8.6 10.3   HGB 7.0* 7.7* 8.3*   HCT 23.8* 25.8* 27.0*    364 409*     No results found for: SDES  Lab Results   Component Value Date/Time    Culture result:  03/03/2023 05:26 PM     NO GROWTH THUS FAR Culture performed on Unspun Fluid    Culture result: NO GROWTH 1 DAY 03/03/2023 05:32 AM    Culture result: MIXED UROGENITAL STANLEY ISOLATED 05/17/2022 09:52 AM    Culture result: NO GROWTH 5 DAYS 05/15/2022 03:51 AM    Culture result: NO GROWTH 5 DAYS 03/15/2018 06:09 PM     Recent Results (from the past 24 hour(s))   GLUCOSE, POC    Collection Time: 03/04/23 12:11 PM   Result Value Ref Range    Glucose (POC) 149 (H) 65 - 117 mg/dL    Performed by Sandra Barreto   RN    GLUCOSE, POC    Collection Time: 03/04/23  6:38 PM   Result Value Ref Range    Glucose (POC) 134 (H) 65 - 117 mg/dL    Performed by Radha Morales  PCT    GLUCOSE, POC    Collection Time: 03/04/23 10:41 PM   Result Value Ref Range    Glucose (POC) 316 (H) 65 - 117 mg/dL    Performed by Mahsa Steele    RENAL FUNCTION PANEL    Collection Time: 03/05/23  3:07 AM   Result Value Ref Range    Sodium 140 136 - 145 mmol/L    Potassium 3.1 (L) 3.5 - 5.1 mmol/L    Chloride 101 97 - 108 mmol/L    CO2 31 21 - 32 mmol/L    Anion gap 8 5 - 15 mmol/L    Glucose 138 (H) 65 - 100 mg/dL    BUN 23 (H) 6 - 20 MG/DL    Creatinine 4.36 (H) 0.55 - 1.02 MG/DL    BUN/Creatinine ratio 5 (L) 12 - 20      eGFR 10 (L) >60 ml/min/1.73m2    Calcium 6.2 (LL) 8.5 - 10.1 MG/DL    Phosphorus 4.1 2.6 - 4.7 MG/DL    Albumin 1.3 (L) 3.5 - 5.0 g/dL   MAGNESIUM    Collection Time: 03/05/23  3:07 AM   Result Value Ref Range    Magnesium 1.9 1.6 - 2.4 mg/dL   CBC WITH AUTOMATED DIFF    Collection Time: 03/05/23  3:07 AM   Result Value Ref Range    WBC 7.6 3.6 - 11.0 K/uL    RBC 2.38 (L) 3.80 - 5.20 M/uL    HGB 7.0 (L) 11.5 - 16.0 g/dL    HCT 23.8 (L) 35.0 - 47.0 %    .0 (H) 80.0 - 99.0 FL    MCH 29.4 26.0 - 34.0 PG    MCHC 29.4 (L) 30.0 - 36.5 g/dL    RDW 15.2 (H) 11.5 - 14.5 %    PLATELET 562 304 - 238 K/uL    MPV 9.1 8.9 - 12.9 FL    NRBC 0.0 0  WBC    ABSOLUTE NRBC 0.00 0.00 - 0.01 K/uL    NEUTROPHILS 64 32 - 75 %    LYMPHOCYTES 18 12 - 49 %    MONOCYTES 11 5 - 13 %    EOSINOPHILS 6 0 - 7 %    BASOPHILS 1 0 - 1 %    IMMATURE GRANULOCYTES 0 0.0 - 0.5 %    ABS. NEUTROPHILS 4.9 1.8 - 8.0 K/UL    ABS. LYMPHOCYTES 1.3 0.8 - 3.5 K/UL    ABS. MONOCYTES 0.8 0.0 - 1.0 K/UL    ABS. EOSINOPHILS 0.5 (H) 0.0 - 0.4 K/UL    ABS. BASOPHILS 0.0 0.0 - 0.1 K/UL    ABS. IMM. GRANS. 0.0 0.00 - 0.04 K/UL    DF AUTOMATED     GLUCOSE, POC    Collection Time: 03/05/23  6:55 AM   Result Value Ref Range    Glucose (POC) 196 (H) 65 - 117 mg/dL    Performed by Raimundo Banda I have reviewed the flowsheets. Chart and Pertinent Notes have been reviewed. No change in PMH ,family and social history from Consult note.       Yanira Winters [facility]

## 2023-03-05 NOTE — DIALYSIS
Lila Coffey  /  [phone]     Orders  Therapy Time: 9.5 hrs   Cycles: 5   Fill Volume: 2300 mL   Last Fill Volume: 2000 mL   Total Volume: 13,500 mL   Solution: (Bag 1&2) 2.5 % Dextrose Dianeal   (Bag 3) 4.25 % Dextrose Dianeal          Comments:   Pt orders, notes, labs, code status and consent reviewed. Time out complete. PD site care: Catheter exit site care and Gentamycin application performed by primary RN prior to arrival.      Prior to connection, one minute Alcavis scrub performed, followed by one minute soak. Start time: 1520  Estimated End Time: 0120    Remained present during initial drain followed by initiation of first fill. Low drain volume alarm. Patient repositioned and drain restarted. Alarm persists. Varsha Flanagan MD notified. Plan to stop Last Fill and reassess in AM. Prior to departure, bed in lowest position, call bell and personal belongings within reach. Education pre/post with patient and primary nurse, CANDICE Aldridge RN .

## 2023-03-05 NOTE — PROGRESS NOTES
[address] [facility] Adult  Hospitalist Group                                                                                          Hospitalist Progress Note  Colin Quintana MD  Answering service: [phone] from in house phone        Date of Service:  3/5/2023  NAME:  Real Cr  :  1955  MRN:  [id number]       Admission Summary:   Rael Cr is a 75 y.o. female who presents with medical hx of recent fall, hypertension, type II dm, CAD, s/p PCI, CHF, CKD on PD exchange, COPD, GERD was admitted to the hospital on 3/3 with RLE pain and swelling. Pt fell on 3/1, hit her head but no LOC. Pt was able get back up after the fall, was able to move around initially. Pt did not seek for medical after immediately after the fall. Pt decided to seek for medical help when her RLE pain continue to get worse. Interval history / Subjective:   Patient seen and examined earlier this morning by me for follow-up of right lower extremity injury and concern for peritonitis. No acute complaints at the time of my exam.  Patient reports feeling better today. Her lower extremity pain is improved. Assessment & Plan:     RLE pain  ? Cellulitis  S/p fall  - afebrile, wbc normal    BC (3/3) negative so far  Patient's right lower extremity is erythematous and very warm. Hard to differentiate cellulitis from just the hematoma in the injury. Pending MRI.   On empiric vancomycin and ceftriaxone  Orthopedic surgery has seen, no concern for compartment syndrome at this time  MRI of right lower extremity ordered for evaluation of ligaments     ESRD-PD with concern for SBP  - nephrology following;  PD fluid cell count looks like peritonitis  Continue ceftriaxone  Fluid cultures pending     Volume overloaded  - continue with UF with PD    Continue with lasix     Hypertension  - continue with hydralazine, coreg     Type II DM  - A1C 6.4    Continue with insulin therapy    Diabetic educator consult in place     Depression  - continue with cympbalta     COPD  - not in exacerbation    On Dulera at home; continue with arformeterol/budesonide neb tmt here    Continue with Singulair    Hypokalemia  On Effer-K    Hypocalcemia  Better when corrected for albumin  We will hold off on IV calcium for now    Anemia, likely of chronic disease  Recheck H&H in the afternoon  Transfuse if hemoglobin less than 7     Code status: Full  Prophylaxis: Heparin  Care Plan discussed with: Patient, nurse  Anticipated Disposition: 48 hours  Inpatient  Cardiac monitoring: Telemetry     Hospital Problems  Date Reviewed: 11/28/2022            Codes Class Noted POA    Acute hypokalemia ICD-10-CM: E87.6  ICD-9-CM: 276.8  3/3/2023 Unknown        Right leg pain ICD-10-CM: M79.604  ICD-9-CM: 729.5  3/3/2023 Unknown        Peritoneal dialysis catheter in place [facility]) ICD-10-CM: Z99.2  ICD-9-CM: V45.11  3/3/2023 Unknown        Right leg swelling ICD-10-CM: M79.89  ICD-9-CM: 729.81  3/3/2023 Unknown         Social Determinants of Health     Tobacco Use: Medium Risk    Smoking Tobacco Use: Former    Smokeless Tobacco Use: Never    Passive Exposure: Not on file   Alcohol Use: Not on file   Financial Resource Strain: Not on file   Food Insecurity: Not on file   Transportation Needs: Not on file   Physical Activity: Not on file   Stress: Not on file   Social Connections: Not on file   Intimate Partner Violence: Not on file   Depression: Not on file   Housing Stability: Not on file       Review of Systems:   A comprehensive review of systems was negative except for that written in the HPI. Vital Signs:    Last 24hrs VS reviewed since prior progress note.  Most recent are:  Visit Vitals  /79   Pulse 86   Temp 98.4 °F (36.9 °C)   Resp 16   Wt 92.5 kg (204 lb)   SpO2 99%   BMI 33.95 kg/m²       No intake or output data in the 24 hours ending 03/05/23 1315       Physical Examination:     I had a face to face encounter with this patient and independently examined them on 3/5/2023 as outlined below:          General : alert x 3, awake, no acute distress,   HEENT: PEERL, EOMI, moist mucus membrane  Neck: supple, no JVD, no meningeal signs  Chest: Clear to auscultation bilaterally   CVS: S1 S2 heard, Capillary refill less than 2 seconds  Abd: soft/ non tender, non distended, BS physiological,   Ext: Right lower extremity at the knee and below is edematous, erythematous, and warm. Raised. Left lower extremity normal range of motion. Nonpitting edema right lower extremity. Left lower extremity no edema  Neuro/Psych: pleasant mood and affect, CN 2-12 grossly intact, sensory grossly within normal limit, Strength 5/5 in all extremities  Skin: warm     Data Review:    Review and/or order of clinical lab test  Review and/or order of tests in the radiology section of CPT  Review and/or order of tests in the medicine section of CPT      I have personally and independently reviewed all pertinent labs, diagnostic studies, imaging, and have provided independent interpretation of the same. Labs:     Recent Labs     03/05/23 0307 03/04/23 0420   WBC 7.6 8.6   HGB 7.0* 7.7*   HCT 23.8* 25.8*    364       Recent Labs     03/05/23 0307 03/04/23 0420 03/03/23 1644 03/03/23  0532    138 138 138   K 3.1* 3.7 3.4* 2.6*    96* 97 94*   CO2 31 35* 35* 33*   BUN 23* 27* 31* 30*   CREA 4.36* 5.03* 5.65* 5.66*   * 211* 150* 145*   CA 6.2* 7.4* 6.6* 7.2*   MG 1.9 2.2  --  2.0   PHOS 4.1 4.2 4.4  --        Recent Labs     03/05/23 0307 03/04/23 0420 03/03/23 1644 03/03/23  0532   ALT  --   --   --  9*   AP  --   --   --  129*   TBILI  --   --   --  0.3   TP  --   --   --  7.7   ALB 1.3* 1.8* 1.6* 2.0*   GLOB  --   --   --  5.7*       No results for input(s): INR, PTP, APTT, INREXT, INREXT in the last 72 hours.    Recent Labs     03/04/23  0420 03/03/23  0532   [facility]  --  123*   PSAT  --  16*   FERR 906*  --         Lab Results   Component Value Date/Time    Folate 5.2 10/19/2022 06:33 PM        No results for input(s): PH, PCO2, PO2 in the last 72 hours. No results for input(s): CPK, CKNDX, TROIQ in the last 72 hours. No lab exists for component: CPKMB  Lab Results   Component Value Date/Time    Cholesterol, total 260 (H) 07/02/2018 04:27 AM    HDL Cholesterol 31 07/02/2018 04:27 AM    LDL, calculated 197.2 (H) 07/02/2018 04:27 AM    Triglyceride 159 (H) 07/02/2018 04:27 AM    CHOL/HDL Ratio 8.4 (H) 07/02/2018 04:27 AM     Lab Results   Component Value Date/Time    Glucose (POC) 151 (H) 03/05/2023 12:10 PM    Glucose (POC) 196 (H) 03/05/2023 06:55 AM    Glucose (POC) 316 (H) 03/04/2023 10:41 PM    Glucose (POC) 134 (H) 03/04/2023 06:38 PM    Glucose (POC) 149 (H) 03/04/2023 12:11 PM     Lab Results   Component Value Date/Time    Color YELLOW/STRAW 12/02/2021 11:00 PM    Appearance CLEAR 12/02/2021 11:00 PM    Specific gravity 1.025 12/02/2021 11:00 PM    Specific gravity 1.010 06/08/2011 12:00 AM    pH (UA) 5.5 12/02/2021 11:00 PM    Protein 300 (A) 12/02/2021 11:00 PM    Glucose 500 (A) 12/02/2021 11:00 PM    Ketone Negative 12/02/2021 11:00 PM    Bilirubin Negative 12/02/2021 11:00 PM    Urobilinogen 1.0 12/02/2021 11:00 PM    Nitrites Negative 12/02/2021 11:00 PM    Leukocyte Esterase Negative 12/02/2021 11:00 PM    Epithelial cells FEW 12/02/2021 11:00 PM    Bacteria Negative 12/02/2021 11:00 PM    WBC 0-4 12/02/2021 11:00 PM    RBC 0-5 12/02/2021 11:00 PM       Notes reviewed from all clinical/nonclinical/nursing services involved in patient's clinical care. Care coordination discussions were held with appropriate clinical/nonclinical/ nursing providers based on care coordination needs. Patients current active Medications were reviewed, considered, added and adjusted based on the clinical condition today. Home Medications were reconciled to the best of my ability given all available resources at the time of admission.  Route is PO if not otherwise noted.       Admission Status:[id number]:::1}      Medications Reviewed:     Current Facility-Administered Medications   Medication Dose Route Frequency    gentamicin (GARAMYCIN) 0.1 % cream   Topical BID    polyethylene glycol (MIRALAX) packet 17 g  17 g Oral DAILY    senna-docusate (PERICOLACE) 8.6-50 mg per tablet 1 Tablet  1 Tablet Oral DAILY    cefTRIAXone (ROCEPHIN) 2 g in 0.9% sodium chloride 20 mL IV syringe  2 g IntraVENous Q24H    vancomycin - pharmacy to dose    Other Rx Dosing/Monitoring    peritoneal dialysis DEXTROSE 2.5% (2.5 mEq/L low calcium) 6,000 mL with heparin (porcine) 3,000 Units   IntraPERitoneal DAILY    peritoneal dialysis DEXTROSE 2.5% (2.5 mEq/L low calcium) 6,000 mL with heparin (porcine) 3,000 Units   IntraPERitoneal DAILY    peritoneal dialysis DEXTROSE 4.25% (2.5 mEq/L low calcium) 6,000 mL with heparin (porcine) 3,000 Units   IntraPERitoneal QHS    epoetin joni-epbx (RETACRIT) injection 20,000 Units  20,000 Units SubCUTAneous Q TUE, THU & SAT    potassium bicarb-citric acid (EFFER-K) tablet 40 mEq  40 mEq Oral DAILY    magnesium oxide (MAG-OX) tablet 400 mg  400 mg Oral DAILY    morphine injection 2 mg  2 mg IntraVENous Q3H PRN    oxyCODONE IR (ROXICODONE) tablet 5 mg  5 mg Oral Q6H PRN    albuterol-ipratropium (DUO-NEB) 2.5 MG-0.5 MG/3 ML  3 mL Nebulization Q6H PRN    amLODIPine (NORVASC) tablet 10 mg  10 mg Oral DAILY    aspirin tablet 325 mg  325 mg Oral DAILY    carvediloL (COREG) tablet 25 mg  25 mg Oral BID WITH MEALS    diclofenac (VOLTAREN) 1 % topical gel 2 g  2 g Topical DAILY    DULoxetine (CYMBALTA) capsule 20 mg  20 mg Oral BID    hydrALAZINE (APRESOLINE) tablet 50 mg  50 mg Oral TID    arformoterol 15 mcg/budesonide 0.5 mg neb solution   Nebulization BID RT    montelukast (SINGULAIR) tablet 10 mg  10 mg Oral QHS    rosuvastatin (CRESTOR) tablet 10 mg  10 mg Oral DAILY    ondansetron (ZOFRAN ODT) tablet 4 mg  4 mg Oral Q8H PRN    acetaminophen (TYLENOL) tablet 650 mg  650 mg Oral Q6H PRN    glucose chewable tablet 16 g  4 Tablet Oral PRN    glucagon (GLUCAGEN) injection 1 mg  1 mg IntraMUSCular PRN    dextrose 10 % infusion 0-250 mL  0-250 mL IntraVENous PRN    heparin (porcine) injection 5,000 Units  5,000 Units SubCUTAneous Q12H    furosemide (LASIX) tablet 80 mg  80 mg Oral BID    insulin lispro (HUMALOG) injection   SubCUTAneous AC&HS    insulin glargine (LANTUS) injection 15 Units  15 Units SubCUTAneous DAILY    gentamicin (GARAMYCIN) 0.1 % cream   Topical DIALYSIS PRN     ______________________________________________________________________  EXPECTED LENGTH OF STAY: - - -  ACTUAL LENGTH OF STAY:          2                 Angelita Cartagena MD

## 2023-03-05 NOTE — DIALYSIS
Peritoneal Dialysis Disconnection / [phone]     Metrics   I-Drain: 741 ml   Total UF: 132 ml   Last Fill: 2000 mls   Last Manual Drain: 0 ml   Net UF:         + 1127 mls   Avg Dwell Time: 51 minutes   Lost Dwell Time: 2 hours 30 minutes   Alarms: Low UF, Low drain volume   Effluent: Clear/yellow     Access   Type & Location: LLQ abd pd catheter   Comments: Prior to disconnection, one-minute Alcavis scrub performed. Sterile mini cap applied and secured to abdomen. Dsg clean, dry and intact. Dsg date: 03/04/23                                         Safety:   Primary Nurse Rpt Pre: Roro Hernandez RN   Primary Nurse Rpt Post: Roro Hernandez RN     Comments:   Nikhil De Oliveira RN at bedside to disconnect pt from CCPD tx. Orders, consent, pt, and code status confirmed. Tx completed. Used cassette and bags discarded. Treatment outcome and alarms discussed with the primary RN. Per RN, interventions (turning, checking patient line) were tried for low UF alarm. Patient states no BM x 4 days. Reported to primary RN.

## 2023-03-05 NOTE — PROGRESS NOTES
[address] March 5, 2023       RE: Angelique Kemp      To Whom It May Concern,    This is to certify that Angelique Kemp is currently hospitalized and will need to be excused from court on 3/6/23. Please feel free to contact my office if you have any questions or concerns. Thank you for your assistance in this matter.                 Sincerely,  Berkley Mckinley MD

## 2023-03-06 ENCOUNTER — APPOINTMENT (OUTPATIENT)
Dept: MRI IMAGING | Age: 68
DRG: 602 | End: 2023-03-06
Attending: PHYSICIAN ASSISTANT
Payer: MEDICARE

## 2023-03-06 ENCOUNTER — APPOINTMENT (OUTPATIENT)
Dept: GENERAL RADIOLOGY | Age: 68
DRG: 602 | End: 2023-03-06
Attending: NURSE PRACTITIONER
Payer: MEDICARE

## 2023-03-06 LAB
ANION GAP SERPL CALC-SCNC: 8 MMOL/L (ref 5–15)
BASOPHILS # BLD: 0.1 K/UL (ref 0–0.1)
BASOPHILS NFR BLD: 1 % (ref 0–1)
BUN SERPL-MCNC: 22 MG/DL (ref 6–20)
BUN/CREAT SERPL: 5 (ref 12–20)
CALCIUM SERPL-MCNC: 7.5 MG/DL (ref 8.5–10.1)
CHLORIDE SERPL-SCNC: 94 MMOL/L (ref 97–108)
CO2 SERPL-SCNC: 34 MMOL/L (ref 21–32)
CREAT SERPL-MCNC: 4.76 MG/DL (ref 0.55–1.02)
DIFFERENTIAL METHOD BLD: ABNORMAL
EOSINOPHIL # BLD: 0.5 K/UL (ref 0–0.4)
EOSINOPHIL NFR BLD: 7 % (ref 0–7)
ERYTHROCYTE [DISTWIDTH] IN BLOOD BY AUTOMATED COUNT: 14.8 % (ref 11.5–14.5)
GLUCOSE BLD STRIP.AUTO-MCNC: 123 MG/DL (ref 65–117)
GLUCOSE BLD STRIP.AUTO-MCNC: 125 MG/DL (ref 65–117)
GLUCOSE BLD STRIP.AUTO-MCNC: 129 MG/DL (ref 65–117)
GLUCOSE BLD STRIP.AUTO-MCNC: 278 MG/DL (ref 65–117)
GLUCOSE SERPL-MCNC: 168 MG/DL (ref 65–100)
HCT VFR BLD AUTO: 24 % (ref 35–47)
HGB BLD-MCNC: 7.3 G/DL (ref 11.5–16)
IMM GRANULOCYTES # BLD AUTO: 0 K/UL (ref 0–0.04)
IMM GRANULOCYTES NFR BLD AUTO: 1 % (ref 0–0.5)
LYMPHOCYTES # BLD: 1.2 K/UL (ref 0.8–3.5)
LYMPHOCYTES NFR BLD: 16 % (ref 12–49)
MCH RBC QN AUTO: 29.6 PG (ref 26–34)
MCHC RBC AUTO-ENTMCNC: 30.4 G/DL (ref 30–36.5)
MCV RBC AUTO: 97.2 FL (ref 80–99)
MONOCYTES # BLD: 0.8 K/UL (ref 0–1)
MONOCYTES NFR BLD: 11 % (ref 5–13)
NEUTS SEG # BLD: 4.8 K/UL (ref 1.8–8)
NEUTS SEG NFR BLD: 64 % (ref 32–75)
NRBC # BLD: 0 K/UL (ref 0–0.01)
NRBC BLD-RTO: 0 PER 100 WBC
PHOSPHATE SERPL-MCNC: 4.4 MG/DL (ref 2.6–4.7)
PLATELET # BLD AUTO: 375 K/UL (ref 150–400)
PMV BLD AUTO: 8.8 FL (ref 8.9–12.9)
POTASSIUM SERPL-SCNC: 3.3 MMOL/L (ref 3.5–5.1)
RBC # BLD AUTO: 2.47 M/UL (ref 3.8–5.2)
SERVICE CMNT-IMP: ABNORMAL
SODIUM SERPL-SCNC: 136 MMOL/L (ref 136–145)
WBC # BLD AUTO: 7.4 K/UL (ref 3.6–11)

## 2023-03-06 PROCEDURE — 97530 THERAPEUTIC ACTIVITIES: CPT

## 2023-03-06 PROCEDURE — A4722 DIALYS SOL FLD VOL > 1999CC: HCPCS | Performed by: NURSE PRACTITIONER

## 2023-03-06 PROCEDURE — 82962 GLUCOSE BLOOD TEST: CPT

## 2023-03-06 PROCEDURE — 73721 MRI JNT OF LWR EXTRE W/O DYE: CPT

## 2023-03-06 PROCEDURE — 74011250637 HC RX REV CODE- 250/637: Performed by: INTERNAL MEDICINE

## 2023-03-06 PROCEDURE — 74011636637 HC RX REV CODE- 636/637: Performed by: CLINICAL NURSE SPECIALIST

## 2023-03-06 PROCEDURE — 74011250636 HC RX REV CODE- 250/636: Performed by: STUDENT IN AN ORGANIZED HEALTH CARE EDUCATION/TRAINING PROGRAM

## 2023-03-06 PROCEDURE — 74011250636 HC RX REV CODE- 250/636: Performed by: NURSE PRACTITIONER

## 2023-03-06 PROCEDURE — 97535 SELF CARE MNGMENT TRAINING: CPT

## 2023-03-06 PROCEDURE — 85025 COMPLETE CBC W/AUTO DIFF WBC: CPT

## 2023-03-06 PROCEDURE — 74011250637 HC RX REV CODE- 250/637: Performed by: NURSE PRACTITIONER

## 2023-03-06 PROCEDURE — A4726 DIALYS SOL FLD VOL > 5999CC: HCPCS | Performed by: NURSE PRACTITIONER

## 2023-03-06 PROCEDURE — 90945 DIALYSIS ONE EVALUATION: CPT

## 2023-03-06 PROCEDURE — 65270000046 HC RM TELEMETRY

## 2023-03-06 PROCEDURE — 84100 ASSAY OF PHOSPHORUS: CPT

## 2023-03-06 PROCEDURE — 97116 GAIT TRAINING THERAPY: CPT

## 2023-03-06 PROCEDURE — 94640 AIRWAY INHALATION TREATMENT: CPT

## 2023-03-06 PROCEDURE — 36415 COLL VENOUS BLD VENIPUNCTURE: CPT

## 2023-03-06 PROCEDURE — 74011000258 HC RX REV CODE- 258: Performed by: STUDENT IN AN ORGANIZED HEALTH CARE EDUCATION/TRAINING PROGRAM

## 2023-03-06 PROCEDURE — 74018 RADEX ABDOMEN 1 VIEW: CPT

## 2023-03-06 PROCEDURE — 80048 BASIC METABOLIC PNL TOTAL CA: CPT

## 2023-03-06 PROCEDURE — 74011000250 HC RX REV CODE- 250: Performed by: NURSE PRACTITIONER

## 2023-03-06 RX ORDER — POLYETHYLENE GLYCOL 3350 17 G/17G
17 POWDER, FOR SOLUTION ORAL 2 TIMES DAILY
Status: DISCONTINUED | OUTPATIENT
Start: 2023-03-06 | End: 2023-03-08 | Stop reason: HOSPADM

## 2023-03-06 RX ADMIN — LACTULOSE 30 ML: 20 SOLUTION ORAL at 17:29

## 2023-03-06 RX ADMIN — DULOXETINE HYDROCHLORIDE 20 MG: 20 CAPSULE, DELAYED RELEASE ORAL at 10:23

## 2023-03-06 RX ADMIN — ASPIRIN 325 MG ORAL TABLET 325 MG: 325 PILL ORAL at 10:23

## 2023-03-06 RX ADMIN — POLYETHYLENE GLYCOL 3350 17 G: 17 POWDER, FOR SOLUTION ORAL at 17:36

## 2023-03-06 RX ADMIN — HEPARIN SODIUM: 1000 INJECTION INTRAVENOUS; SUBCUTANEOUS at 18:08

## 2023-03-06 RX ADMIN — HEPARIN SODIUM: 1000 INJECTION INTRAVENOUS; SUBCUTANEOUS at 18:07

## 2023-03-06 RX ADMIN — POTASSIUM BICARBONATE 40 MEQ: 782 TABLET, EFFERVESCENT ORAL at 10:24

## 2023-03-06 RX ADMIN — OXYCODONE HYDROCHLORIDE 5 MG: 5 TABLET ORAL at 10:41

## 2023-03-06 RX ADMIN — HEPARIN SODIUM 5000 UNITS: 5000 INJECTION INTRAVENOUS; SUBCUTANEOUS at 12:28

## 2023-03-06 RX ADMIN — HYDRALAZINE HYDROCHLORIDE 50 MG: 50 TABLET, FILM COATED ORAL at 10:23

## 2023-03-06 RX ADMIN — Medication 3 UNITS: at 22:44

## 2023-03-06 RX ADMIN — OXYCODONE HYDROCHLORIDE 5 MG: 5 TABLET ORAL at 19:40

## 2023-03-06 RX ADMIN — HYDRALAZINE HYDROCHLORIDE 50 MG: 50 TABLET, FILM COATED ORAL at 22:43

## 2023-03-06 RX ADMIN — CARVEDILOL 25 MG: 12.5 TABLET, FILM COATED ORAL at 19:34

## 2023-03-06 RX ADMIN — INSULIN GLARGINE 15 UNITS: 100 INJECTION, SOLUTION SUBCUTANEOUS at 10:32

## 2023-03-06 RX ADMIN — GENTAMICIN SULFATE: 1 CREAM TOPICAL at 18:26

## 2023-03-06 RX ADMIN — OXYCODONE HYDROCHLORIDE 5 MG: 5 TABLET ORAL at 04:28

## 2023-03-06 RX ADMIN — POLYETHYLENE GLYCOL 3350 17 G: 17 POWDER, FOR SOLUTION ORAL at 10:42

## 2023-03-06 RX ADMIN — ROSUVASTATIN 10 MG: 10 TABLET, FILM COATED ORAL at 10:23

## 2023-03-06 RX ADMIN — MONTELUKAST 10 MG: 10 TABLET, FILM COATED ORAL at 22:26

## 2023-03-06 RX ADMIN — ARFORMOTEROL TARTRATE: 15 SOLUTION RESPIRATORY (INHALATION) at 20:44

## 2023-03-06 RX ADMIN — DICLOFENAC 2 G: 10 GEL TOPICAL at 10:25

## 2023-03-06 RX ADMIN — GENTAMICIN SULFATE: 1 CREAM TOPICAL at 10:30

## 2023-03-06 RX ADMIN — HEPARIN SODIUM 5000 UNITS: 5000 INJECTION INTRAVENOUS; SUBCUTANEOUS at 02:08

## 2023-03-06 RX ADMIN — PIPERACILLIN AND TAZOBACTAM 4.5 G: 4; .5 INJECTION, POWDER, FOR SOLUTION INTRAVENOUS at 10:52

## 2023-03-06 RX ADMIN — FUROSEMIDE 80 MG: 40 TABLET ORAL at 10:23

## 2023-03-06 RX ADMIN — AMLODIPINE BESYLATE 10 MG: 5 TABLET ORAL at 10:24

## 2023-03-06 RX ADMIN — PIPERACILLIN AND TAZOBACTAM 3.38 G: 3; .375 INJECTION, POWDER, LYOPHILIZED, FOR SOLUTION INTRAVENOUS at 20:00

## 2023-03-06 RX ADMIN — DOCUSATE SODIUM 50 MG AND SENNOSIDES 8.6 MG 1 TABLET: 8.6; 5 TABLET, FILM COATED ORAL at 10:23

## 2023-03-06 RX ADMIN — DULOXETINE HYDROCHLORIDE 20 MG: 20 CAPSULE, DELAYED RELEASE ORAL at 17:34

## 2023-03-06 RX ADMIN — POTASSIUM BICARBONATE 40 MEQ: 782 TABLET, EFFERVESCENT ORAL at 17:35

## 2023-03-06 RX ADMIN — ARFORMOTEROL TARTRATE: 15 SOLUTION RESPIRATORY (INHALATION) at 08:15

## 2023-03-06 RX ADMIN — MAGNESIUM OXIDE 400 MG (241.3 MG MAGNESIUM) TABLET 400 MG: TABLET at 10:23

## 2023-03-06 RX ADMIN — GENTAMICIN SULFATE: 1 CREAM TOPICAL at 19:36

## 2023-03-06 RX ADMIN — CARVEDILOL 25 MG: 12.5 TABLET, FILM COATED ORAL at 07:08

## 2023-03-06 NOTE — PROGRESS NOTES
Problem: Pressure Injury - Risk of  Goal: *Prevention of pressure injury  Description: Document Waldo Scale and appropriate interventions in the flowsheet. Outcome: Progressing Towards Goal  Note: Pressure Injury Interventions:       Moisture Interventions: Internal/External urinary devices    Activity Interventions: Increase time out of bed    Mobility Interventions: HOB 30 degrees or less    Nutrition Interventions: Offer support with meals,snacks and hydration, Document food/fluid/supplement intake    Problem: Pain  Goal: *Control of Pain  Outcome: Progressing Towards Goal     Problem: Falls - Risk of  Goal: *Absence of Falls  Description: Document Jairo Fall Risk and appropriate interventions in the flowsheet.   Outcome: Progressing Towards Goal  Note: Fall Risk Interventions:

## 2023-03-06 NOTE — DIALYSIS
Peritoneal Dialysis Disconnection / [phone]     Metrics   I-Drain: 952ml   Total UF: 85 ml   Last Fill: 2000 mls   Last Manual Drain: 0 ml   Net UF:         -963 mls   Avg Dwell Time:  1 hour 14 minutes   Lost Dwell Time:  hours  minutes   Alarms: Low UF, Low drain volume   Effluent: Clear/yellow     Access   Type & Location: LLQ abd pd catheter   Comments: Prior to disconnection, one-minute Alcavis scrub performed. Sterile mini cap applied and secured to abdomen. Dsg clean, dry and intact. Dsg date: 03/05/23                                         Safety:   Primary Nurse Rpt Pre: Farnaz Corey RN   Primary Nurse Rpt PostRachel Vance RN     Comments:   Gunjan Suarez RN at bedside to disconnect pt from CCPD tx. Orders, consent, pt, and code status confirmed. Tx completed. Used cassette and bags discarded. Treatment outcome and alarms discussed with the primary RN.

## 2023-03-06 NOTE — PROGRESS NOTES
Physician Progress Note      Carole Brito  [facility] #:                  [id number]  :                       1955  ADMIT DATE:       3/3/2023 3:25 AM  DISCH DATE:  RESPONDING  PROVIDER #:        Rashel Linda MD          QUERY TEXT:    Dear Attending,    Pt admitted with RLE pain post fall 3/1. Pt noted to have ? Cellulitis documented. If possible, please document in progress notes and discharge summary the relationship, if any, between RLE pain and Cellulitis. The medical record reflects the following:  Risk Factors: S/p fall 3/1, CKD HTN, DM 2 CHF  Clinical Indicators: RLE erythematous and very warm, pain and swelling, afebrile, wbc normal  ?? BC (3/3) negative so far, CRP 11.40  Treatment: empiric vancomycin and ceftriaxone, Zosyn, IVFs,    Please call if you have any questions or need assistance. I can also be reached via [facility] or [facility] # [phone]. Thank you,  Denzel Javier, [facility]  O: [phone]  Options provided:  -- RLE pain due to Cellulitis  -- RLE pain due to Hematoma post fall  -- Other - I will add my own diagnosis  -- Disagree - Not applicable / Not valid  -- Disagree - Clinically unable to determine / Unknown  -- Refer to Clinical Documentation Reviewer    PROVIDER RESPONSE TEXT:    Fall, torn ACL, meniscus. Likely cellulitis. Query created by:  Hiral Hyatt on 3/6/2023 2:07 PM      Electronically signed by:  Rashel Linda MD 3/6/2023 6:53 PM

## 2023-03-06 NOTE — PROGRESS NOTES
Transition Of Care: Noted, Infectious disease consult placed today. Home health pending with [facility]. The patient is a PD dialysis patient at [facility] clinic. The patient plans to discharge home with family to transport when stable for discharge. RUR: 18%    Peritoneal Dialysis Clinic is [address] [facility], [address]  Phone: [phone]  Fax: [phone]    CM following for discharge needs.     Lili Saeed RN/CRM

## 2023-03-06 NOTE — PROGRESS NOTES
Reviewed KUB   Impression: Nondilated, nonobstructive bowel gas pattern. Mild scattered colonic stool  burden. Atherosclerosis. Left groin metallic vascular stent. Peritoneal  catheter.   Lactulose added to bowel regimen- no day dwell today- plan to check cell count oon CCPD tonight spoke with [facility] RN

## 2023-03-06 NOTE — PROGRESS NOTES
[facility]   [address], [address] On license of [facility]  Phone: [phone]   [phone]    [email or web address]     Nephrology Progress Note    Patient Name : Isaura Bianchi      : 1955     MRN : [id number]  Date of Admission : 3/3/2023  Date of Servive : 23    CC: Follow up for ESRD       Assessment and Plan   ESRD-PD :  - CCPD, [facility], Dr. Amalia Cortez  - cont CCPD , with add day dwell @ 12 noon -4 hours  -  repeat PD fluid cell count   -  3/3 PD gram (-) culture (diptheroids)  - on empiric Vanco/Rocephin  - Increased Miralax BID continue with senna Docusate, may need Lactulose  - Gentamicin cream on PD site  - Daily labs    Volume overloaded  -Intensified UF with PD (using 4.25% on top of 2.5% PD fluid)  -Cont Lasix 80 IV BID    Hypokalemia: Chronic  - Increase KCL supplements BID    Hypomagnesemia  -replete accordingly    Anemia in CKD  -Hb 7.3 G/dl  -Tsat 16,ferritin 906 -- no IV iron  -MADY 20K     MBD of CKD  - corca stable  - Phos stable    S/p GLF  RLE cellulitis  -Consulted Ortho    DM2  -MX per primary team    HTN  -Continue home meds    CAD  HFpEF  GERD       Interval History:  Patient seen and examined this AM. She is breathing better but continues with weakness during ambulation. She continues to reabsorb with using 4.5% last night. Still no BM. No cell count this AM.     Mrs. Romero is a 70-year-old -American woman history of ESR of 2/2 DM HTN and has been dialysis dependent. She switched to PD approximately 8 months ago and is followed by Dr. Amalia Cortez. Prior to that she was followed by Dr. Antoine Carter. Recently she had a repeat PET study  She has chronic hypokalemia since January but is unable to feel potassium prescription. She presented to ER after a fall and worsening RLE pain and swelling. She is having difficulty weightbearing. In the ER she had a head CT that showed bilateral intraparotid nodules.   She had x-rays that rule out any fracture    With regards to her PD, she reports 1 day of cloudy fluid last week. She did not get PD fluid cell count or Gram stain at the time. She completed PD yesterday during the day prior to coming to the ER and reports clear fluid. Review of Systems: A comprehensive review of systems was negative except for that written in the HPI.     Current Medications:   Current Facility-Administered Medications   Medication Dose Route Frequency    piperacillin-tazobactam (ZOSYN) 3.375 g in 0.9% sodium chloride (MBP/ADV) 100 mL MBP  3.375 g IntraVENous Q12H    piperacillin-tazobactam (ZOSYN) 4.5 g in 0.9% sodium chloride (MBP/ADV) 100 mL MBP  4.5 g IntraVENous ONCE    gentamicin (GARAMYCIN) 0.1 % cream   Topical BID    polyethylene glycol (MIRALAX) packet 17 g  17 g Oral DAILY    senna-docusate (PERICOLACE) 8.6-50 mg per tablet 1 Tablet  1 Tablet Oral DAILY    peritoneal dialysis DEXTROSE 4.25% (2.5 mEq/L low calcium) 6,000 mL with heparin (porcine) 3,000 Units   IntraPERitoneal DAILY    vancomycin - pharmacy to dose    Other Rx Dosing/Monitoring    peritoneal dialysis DEXTROSE 2.5% (2.5 mEq/L low calcium) 6,000 mL with heparin (porcine) 3,000 Units   IntraPERitoneal DAILY    peritoneal dialysis DEXTROSE 2.5% (2.5 mEq/L low calcium) 6,000 mL with heparin (porcine) 3,000 Units   IntraPERitoneal DAILY    epoetin joni-epbx (RETACRIT) injection 20,000 Units  20,000 Units SubCUTAneous Q TUE, THU & SAT    potassium bicarb-citric acid (EFFER-K) tablet 40 mEq  40 mEq Oral DAILY    magnesium oxide (MAG-OX) tablet 400 mg  400 mg Oral DAILY    morphine injection 2 mg  2 mg IntraVENous Q3H PRN    oxyCODONE IR (ROXICODONE) tablet 5 mg  5 mg Oral Q6H PRN    albuterol-ipratropium (DUO-NEB) 2.5 MG-0.5 MG/3 ML  3 mL Nebulization Q6H PRN    amLODIPine (NORVASC) tablet 10 mg  10 mg Oral DAILY    aspirin tablet 325 mg  325 mg Oral DAILY    carvediloL (COREG) tablet 25 mg  25 mg Oral BID WITH MEALS    diclofenac (VOLTAREN) 1 % topical gel 2 g  2 g Topical DAILY    DULoxetine (CYMBALTA) capsule 20 mg  20 mg Oral BID    hydrALAZINE (APRESOLINE) tablet 50 mg  50 mg Oral TID    arformoterol 15 mcg/budesonide 0.5 mg neb solution   Nebulization BID RT    montelukast (SINGULAIR) tablet 10 mg  10 mg Oral QHS    rosuvastatin (CRESTOR) tablet 10 mg  10 mg Oral DAILY    ondansetron (ZOFRAN ODT) tablet 4 mg  4 mg Oral Q8H PRN    acetaminophen (TYLENOL) tablet 650 mg  650 mg Oral Q6H PRN    glucose chewable tablet 16 g  4 Tablet Oral PRN    glucagon (GLUCAGEN) injection 1 mg  1 mg IntraMUSCular PRN    dextrose 10 % infusion 0-250 mL  0-250 mL IntraVENous PRN    heparin (porcine) injection 5,000 Units  5,000 Units SubCUTAneous Q12H    furosemide (LASIX) tablet 80 mg  80 mg Oral BID    insulin lispro (HUMALOG) injection   SubCUTAneous AC&HS    insulin glargine (LANTUS) injection 15 Units  15 Units SubCUTAneous DAILY    gentamicin (GARAMYCIN) 0.1 % cream   Topical DIALYSIS PRN      Allergies   Allergen Reactions    Ivp Dye [Fd And C Blue No.1] Hives       Objective:  Vitals:    Vitals:    03/06/23 0422 03/06/23 0600 03/06/23 0708 03/06/23 0715   BP: 132/70  (!) 146/74    Pulse: 91 95 97    Resp: 16      Temp: 98 °F (36.7 °C)      SpO2: 96%      Weight:    105.9 kg (233 lb 7.5 oz)     Intake and Output:  No intake/output data recorded.   03/04 1901 - 03/06 0700  In: -   Out: -292 [Urine:450]    Physical Examination:  General: Obese  Neck:  Supple, no mass  Resp:  Diminished at bases   CV:  RRR,  no murmur or rub, pitting R>L LE edema  GI:  PD catheter exit site clean  Neurologic:  Non focal  Psych:             AAO x 3 appropriate affect   Extremities: RLE tenderness around knee with bruising and swelling  :        []    High complexity decision making was performed  []    Patient is at high-risk of decompensation with multiple organ involvement    Lab Data Personally Reviewed: I have reviewed all the pertinent labs, microbiology data and radiology studies during assessment.     Recent Labs     03/06/23 0310 03/05/23  0307 03/04/23  0420 03/03/23  1644    140 138 138   K 3.3* 3.1* 3.7 3.4*   CL 94* 101 96* 97   CO2 34* 31 35* 35*   * 138* 211* 150*   BUN 22* 23* 27* 31*   CREA 4.76* 4.36* 5.03* 5.65*   CA 7.5* 6.2* 7.4* 6.6*   MG  --  1.9 2.2  --    PHOS 4.4 4.1 4.2 4.4   ALB  --  1.3* 1.8* 1.6*       Recent Labs     03/06/23 0310 03/05/23  1435 03/05/23 0307 03/04/23  0420   WBC 7.4  --  7.6 8.6   HGB 7.3* 7.3* 7.0* 7.7*   HCT 24.0* 24.5* 23.8* 25.8*     --  352 364       No results found for: SDES  Lab Results   Component Value Date/Time    Culture result: FEW DIPHTHEROIDS (A) 03/03/2023 05:26 PM    Culture result: NO GROWTH 3 DAYS 03/03/2023 05:32 AM    Culture result: MIXED UROGENITAL STANLEY ISOLATED 05/17/2022 09:52 AM    Culture result: NO GROWTH 5 DAYS 05/15/2022 03:51 AM    Culture result: NO GROWTH 5 DAYS 03/15/2018 06:09 PM     Recent Results (from the past 24 hour(s))   GLUCOSE, POC    Collection Time: 03/05/23 12:10 PM   Result Value Ref Range    Glucose (POC) 151 (H) 65 - 117 mg/dL    Performed by Duy Shabazz RN    HGB & HCT    Collection Time: 03/05/23  2:35 PM   Result Value Ref Range    HGB 7.3 (L) 11.5 - 16.0 g/dL    HCT 24.5 (L) 35.0 - 47.0 %   GLUCOSE, POC    Collection Time: 03/05/23  5:22 PM   Result Value Ref Range    Glucose (POC) 179 (H) 65 - 117 mg/dL    Performed by Duy Shabazz RN    GLUCOSE, POC    Collection Time: 03/05/23 10:04 PM   Result Value Ref Range    Glucose (POC) 244 (H) 65 - 117 mg/dL    Performed by Alli Green    PHOSPHORUS    Collection Time: 03/06/23  3:10 AM   Result Value Ref Range    Phosphorus 4.4 2.6 - 4.7 MG/DL   CBC WITH AUTOMATED DIFF    Collection Time: 03/06/23  3:10 AM   Result Value Ref Range    WBC 7.4 3.6 - 11.0 K/uL    RBC 2.47 (L) 3.80 - 5.20 M/uL    HGB 7.3 (L) 11.5 - 16.0 g/dL    HCT 24.0 (L) 35.0 - 47.0 %    MCV 97.2 80.0 - 99.0 FL    MCH 29.6 26.0 - 34.0 PG    MCHC 30.4 30.0 - 36.5 g/dL    RDW 14.8 (H) 11.5 - 14.5 %    PLATELET 961 138 - 056 K/uL    MPV 8.8 (L) 8.9 - 12.9 FL    NRBC 0.0 0  WBC    ABSOLUTE NRBC 0.00 0.00 - 0.01 K/uL    NEUTROPHILS 64 32 - 75 %    LYMPHOCYTES 16 12 - 49 %    MONOCYTES 11 5 - 13 %    EOSINOPHILS 7 0 - 7 %    BASOPHILS 1 0 - 1 %    IMMATURE GRANULOCYTES 1 (H) 0.0 - 0.5 %    ABS. NEUTROPHILS 4.8 1.8 - 8.0 K/UL    ABS. LYMPHOCYTES 1.2 0.8 - 3.5 K/UL    ABS. MONOCYTES 0.8 0.0 - 1.0 K/UL    ABS. EOSINOPHILS 0.5 (H) 0.0 - 0.4 K/UL    ABS. BASOPHILS 0.1 0.0 - 0.1 K/UL    ABS. IMM. GRANS. 0.0 0.00 - 0.04 K/UL    DF AUTOMATED     METABOLIC PANEL, BASIC    Collection Time: 03/06/23  3:10 AM   Result Value Ref Range    Sodium 136 136 - 145 mmol/L    Potassium 3.3 (L) 3.5 - 5.1 mmol/L    Chloride 94 (L) 97 - 108 mmol/L    CO2 34 (H) 21 - 32 mmol/L    Anion gap 8 5 - 15 mmol/L    Glucose 168 (H) 65 - 100 mg/dL    BUN 22 (H) 6 - 20 MG/DL    Creatinine 4.76 (H) 0.55 - 1.02 MG/DL    BUN/Creatinine ratio 5 (L) 12 - 20      eGFR 9 (L) >60 ml/min/1.73m2    Calcium 7.5 (L) 8.5 - 10.1 MG/DL   GLUCOSE, POC    Collection Time: 03/06/23  7:02 AM   Result Value Ref Range    Glucose (POC) 125 (H) 65 - 117 mg/dL    Performed by Nishant Garcia            I have reviewed the flowsheets. Chart and Pertinent Notes have been reviewed. No change in PMH ,family and social history from Consult note.       Marv Shah NP  [facility]

## 2023-03-06 NOTE — PROGRESS NOTES
Day #1 of Zosyn  Indication:  intra-abdominal infection  Current regimen:  2.25 grams q12h  Abx regimen: Zosyn  Recent Labs     23  0310 23  0307 23  0420   WBC 7.4 7.6 8.6   CREA 4.76* 4.36* 5.03*   BUN 22* 23* 27*     Est CrCl: PD ml/min  Temp (24hrs), Av.2 °F (36.8 °C), Min:97.8 °F (36.6 °C), Max:98.4 °F (36.9 °C)    Cultures: none    Plan: Change to 3.375 grams q12h

## 2023-03-06 NOTE — PROGRESS NOTES
Problem: Self Care Deficits Care Plan (Adult)  Goal: *Acute Goals and Plan of Care (Insert Text)  Description: FUNCTIONAL STATUS PRIOR TO ADMISSION: Patient was modified independent using a single point cane and rollator PRN for functional mobility. Patient reports she occasionally does not use AD for brief mobility in the home. HOME SUPPORT: The patient lived with son but did not require assist. Son works full time. Occupational Therapy Goals  Initiated 3/3/2023  1. Patient will perform lower body dressing with independence within 7 day(s). 2.  Patient will perform upper body dressing with independence within 7 day(s). 3.  Patient will perform grooming with independence within 7 day(s). 4.  Patient will perform toilet transfers with independence within 7 day(s). 5.  Patient will perform all aspects of toileting with independence within 7 day(s). 6.  Patient will participate in upper extremity therapeutic exercise/activities with independence for 5 minutes within 7 day(s). 7.  Patient will utilize energy conservation techniques during functional activities with verbal cues within 7 day(s). Outcome: Progressing Towards Goal    OCCUPATIONAL THERAPY TREATMENT  Patient: Real Cr (83 y.o. female)  Date: 3/6/2023  Diagnosis: Right leg swelling [M79.89]  Right leg pain [M79.604]  Peritoneal dialysis catheter in place [facility]) [Z99.2]  Acute hypokalemia [E87.6] <principal problem not specified>      Precautions: Fall  Chart, occupational therapy assessment, plan of care, and goals were reviewed. ASSESSMENT  Patient continues with skilled OT services and is progressing towards goals. AAOx4, motivated and participated during her session today. She was seen and fit for her [place] [id number] with RN. KI was too small, not connecting in the middle and concern for skin breakdown. RN to reach out to Ortho team for larger size.  The patient tolerated bed mobility and sit<>stand well, min A to mod A respectively but improving with each trial. Anticipate with further opportunities and proper fitting KI she will improve with her functional mobility. Mod A needed for LB dressing due to pain, swelling and KI, pt identified this level of assistance at home. Feel she will be safe to dc home with family and [address] [place] [address], may benefit from [facility] or toilet raiser depending on progress. Acute care OT will continue to follow. Current Level of Function Impacting Discharge (ADLs): mod to max A LB ADLs and sit<>stand,     Other factors to consider for discharge: supportive family, high motivated          PLAN :  Patient continues to benefit from skilled intervention to address the above impairments. Continue treatment per established plan of care to address goals. Recommend with staff: OOB to chair and use of BSC    Recommend next OT session: follow POC    Recommendation for discharge: (in order for the patient to meet his/her long term goals)  Occupational therapy at least 2 days/week in the home AND ensure assist and/or supervision for safety with ADLs    This discharge recommendation:  Has been made in collaboration with the attending provider and/or case management    IF patient discharges home will need the following DME: maybe BSC or raised toilet seat       SUBJECTIVE:   Patient stated I am going to get it done, I don't give up.     OBJECTIVE DATA SUMMARY:   Cognitive/Behavioral Status:  Neurologic State: Alert  Orientation Level: Oriented X4  Cognition: Appropriate decision making; Appropriate for age attention/concentration             Functional Mobility and Transfers for ADLs:  Bed Mobility:  Rolling: Supervision  Supine to Sit: Minimum assistance; Other (comment) (for RLE management)  Sit to Supine: Other (comment) (remained in the chair)  Scooting: Supervision    Transfers:  Sit to Stand: Minimum assistance;Assist x1;Other (comment) (from elevated bed height)          Balance:  Sitting: Intact  Standing: Impaired  Standing - Static: Good;Constant support  Standing - Dynamic : Good;Constant support    ADL Intervention:       Grooming  Grooming Assistance: Set-up    Help fit the KI that was in her room. Discussion of when to wear, landmarks for checking fit and how to reposition during the day. She was limited in access, size of KI and vision to see straps today. Lower Body Dressing Assistance  Socks: Maximum assistance (don/doff of KI brace)              Therapeutic Exercises:   Bed Moblity: min A to help manage RLE, good use of railing and body mechanics  Sit<>Stand: mod A; needed cues for hand placement and then assistance to get upright on 2 trials but less assistance on 2nd trial     Pain:  Not reported     Activity Tolerance:   Good    After treatment patient left in no apparent distress:   Supine in bed, Heels elevated for pressure relief, Call bell within reach, and Side rails x 3    COMMUNICATION/COLLABORATION:   The patients plan of care was discussed with: Registered nurse.      Paddy Ashby  Time Calculation: 34 mins

## 2023-03-06 NOTE — PROGRESS NOTES
ORTHO PROGRESS NOTE    2023  Admit Date:   3/3/2023    Post Op day: * No surgery found *    Subjective:    Jacki Romero states that her right knee is feeling somewhat better but still has some limited range of motion. She underwent her MRI of her knee earlier this morning. She has no new orthopedic complaints.     PT/OT:   Gait:  Gait  Base of Support: Widened, Shift to left  Speed/Maryann: Slow, Shuffled  Step Length: Right shortened, Left shortened  Swing Pattern: Right asymmetrical  Stance: Right decreased, Left increased  Gait Abnormalities: Antalgic, Decreased step clearance, Trunk sway increased  Ambulation - Level of Assistance: Contact guard assistance  Distance (ft): 8 Feet (ft)  Assistive Device: Walker, rolling, Gait belt                 Vital Signs:    Patient Vitals for the past 8 hrs:   BP Temp Pulse Resp SpO2 Weight   23 0815 -- -- -- -- 97 % --   23 0812 (!) 142/75 98.2 °F (36.8 °C) 99 16 97 % --   23 0715 -- -- -- -- -- 105.9 kg (233 lb 7.5 oz)   23 0708 (!) 146/74 -- 97 -- -- --   23 0600 -- -- 95 -- -- --   23 0422 132/70 98 °F (36.7 °C) 91 16 96 % --   23 0358 -- -- 90 -- -- --   23 0159 -- -- 88 -- -- --     Temp (24hrs), Av.1 °F (36.7 °C), Min:97.8 °F (36.6 °C), Max:98.4 °F (36.9 °C)      Pain Control:   Pain Assessment  Pain Scale 1: Numeric (0 - 10)  Pain Intensity 1: 8  Pain Location 1: Knee  Pain Orientation 1: Right  Pain Description 1: Aching  Pain Intervention(s) 1: Medication (see MAR)    Meds:    Current Facility-Administered Medications   Medication Dose Route Frequency    piperacillin-tazobactam (ZOSYN) 3.375 g in 0.9% sodium chloride (MBP/ADV) 100 mL MBP  3.375 g IntraVENous Q12H    piperacillin-tazobactam (ZOSYN) 4.5 g in 0.9% sodium chloride (MBP/ADV) 100 mL MBP  4.5 g IntraVENous ONCE    polyethylene glycol (MIRALAX) packet 17 g  17 g Oral BID    potassium bicarb-citric acid (EFFER-K) tablet 40 mEq  40 mEq Oral BID peritoneal dialysis DEXTROSE 2.5% (2.5 mEq/L low calcium) solution 2,000 mL  2,000 mL IntraPERitoneal DAILY    gentamicin (GARAMYCIN) 0.1 % cream   Topical BID    senna-docusate (PERICOLACE) 8.6-50 mg per tablet 1 Tablet  1 Tablet Oral DAILY    peritoneal dialysis DEXTROSE 4.25% (2.5 mEq/L low calcium) 6,000 mL with heparin (porcine) 3,000 Units   IntraPERitoneal DAILY    vancomycin - pharmacy to dose    Other Rx Dosing/Monitoring    peritoneal dialysis DEXTROSE 2.5% (2.5 mEq/L low calcium) 6,000 mL with heparin (porcine) 3,000 Units   IntraPERitoneal DAILY    peritoneal dialysis DEXTROSE 2.5% (2.5 mEq/L low calcium) 6,000 mL with heparin (porcine) 3,000 Units   IntraPERitoneal DAILY    epoetin joni-epbx (RETACRIT) injection 20,000 Units  20,000 Units SubCUTAneous Q TUE, THU & SAT    magnesium oxide (MAG-OX) tablet 400 mg  400 mg Oral DAILY    morphine injection 2 mg  2 mg IntraVENous Q3H PRN    oxyCODONE IR (ROXICODONE) tablet 5 mg  5 mg Oral Q6H PRN    albuterol-ipratropium (DUO-NEB) 2.5 MG-0.5 MG/3 ML  3 mL Nebulization Q6H PRN    amLODIPine (NORVASC) tablet 10 mg  10 mg Oral DAILY    aspirin tablet 325 mg  325 mg Oral DAILY    carvediloL (COREG) tablet 25 mg  25 mg Oral BID WITH MEALS    diclofenac (VOLTAREN) 1 % topical gel 2 g  2 g Topical DAILY    DULoxetine (CYMBALTA) capsule 20 mg  20 mg Oral BID    hydrALAZINE (APRESOLINE) tablet 50 mg  50 mg Oral TID    arformoterol 15 mcg/budesonide 0.5 mg neb solution   Nebulization BID RT    montelukast (SINGULAIR) tablet 10 mg  10 mg Oral QHS    rosuvastatin (CRESTOR) tablet 10 mg  10 mg Oral DAILY    ondansetron (ZOFRAN ODT) tablet 4 mg  4 mg Oral Q8H PRN    acetaminophen (TYLENOL) tablet 650 mg  650 mg Oral Q6H PRN    glucose chewable tablet 16 g  4 Tablet Oral PRN    glucagon (GLUCAGEN) injection 1 mg  1 mg IntraMUSCular PRN    dextrose 10 % infusion 0-250 mL  0-250 mL IntraVENous PRN    heparin (porcine) injection 5,000 Units  5,000 Units SubCUTAneous Q12H furosemide (LASIX) tablet 80 mg  80 mg Oral BID    insulin lispro (HUMALOG) injection   SubCUTAneous AC&HS    insulin glargine (LANTUS) injection 15 Units  15 Units SubCUTAneous DAILY    gentamicin (GARAMYCIN) 0.1 % cream   Topical DIALYSIS PRN       LAB:    Recent Labs     03/06/23  0310   HCT 24.0*   HGB 7.3*       Transfuse PRBC's:      Assessment & Physician's Comment:  Neurovascular checks within normal limits  Orientation:  Oriented  Patient is awake and alert sitting at edge of bed; NAD; agreeable to exam; right knee with marked soft tissue swelling but no erythema or visible ecchymosis; she has tenderness to palpation along the medial lateral joint lines. She has an active range of motion from 0 to about 40 degrees; joint swelling makes it difficult to fully assess ligamentous structures but she seems grossly stable overall; moves her ankles and toes to command; distal sensory function grossly intact; distal pulses palpable     EXAM:  MRI KNEE RT WO CONT     INDICATION: Right knee pain with tenderness to palpation medially     COMPARISON: 3/3/2023     TECHNIQUE: Axial T2 fat-saturated and proton density fat-saturated; coronal T1  and proton density fat-saturated; and sagittal T2 fat-saturated, proton density  fat-saturated, and gradient echo MRI of the right knee . CONTRAST:  None. FINDINGS:      ACL and PCL: ACL is torn. PCL is intact     Collateral ligaments and posterior, lateral corner: Intact. Extensor mechanism: Intact. .      Patellofemoral alignment: No patellar subluxation/tilt. Trochlear groove is not  hypoplastic. TT-TG distance: 7 mm     Joint fluid: Small      Medial meniscus: Complex vertical tear peripherally posterior horn medial  meniscus. Marked volume loss within the body.  A displaced meniscal fragment is  not identified       Lateral meniscus: Complex tear posterior horn and body lateral meniscus     Articular cartilage: Diffuse high-grade partial-thickness cartilage loss  weightbearing and posterior weightbearing surface lateral femoral condyle and  posterior half lateral tibial plateau     No focal cartilage defects medially     No focal cartilage defects patellofemoral compartment      Bone marrow: Degenerative spurring. No fracture or dislocation      Soft tissue mass: None. There is severe edema in the subcutaneous tissues     IMPRESSION     1 . Torn ACL  2. Complex vertical tear peripherally body and posterior horn medial meniscus. Marked volume loss within the body  3. Complex tear body and posterior horn lateral meniscus  4. Moderate cartilage loss lateral compartment  5. Severe edema in the subcutaneous tissues. Active Problems:    Acute hypokalemia (3/3/2023)      Right leg pain (3/3/2023)      Peritoneal dialysis catheter in place [facility]) (3/3/2023)      Right leg swelling (3/3/2023)        Plan:    Right knee pain and swelling status post fall now with findings of ACL tears as well as numerous areas of meniscus damage and underlying osteoarthritis    Discussed in detail with patient her MRI findings. At this time she has no acute surgical indication. She is functional at baseline but is not involved in higher impact physical activities. Recommend that she remain in her knee immobilizer while bearing weight at this time but may weight bear weight as tolerated. Ultimately will likely need a total knee replacement down the road but will allow structures to heal and see how she tolerates things presently. Advised elevating the right lower extremity to help reduce swelling and use of ice packs about knee  PT/OT for mobilization -weight-bear as tolerated in the right lower extremity with knee immobilizer in place  Pain control as needed  Medical management per primary team  May follow-up outpatient for further evaluation and treatment options related to her right knee.     [facility] [address] [facility]

## 2023-03-06 NOTE — DIABETES MGMT
[address] [facility]  DIABETES MANAGEMENT CONSULT    Consulted by Provider for advanced nursing evaluation and care for inpatient blood glucose management. Evaluation and Action Plan   Ambrose Lovett is a 66yo AA female living with T2D/ ESRD-PD dependant -  PTA endorses taking once weekly Trulicity/ and basal insulin . Voices her meter broke so not been checking Bg. Endorses \"she doesn't eat much\". Admitted s/p fall PTA with subsequent right LE injury of hematoma-ortho consulted. Current A1C 6.4%    BG trends over weekend, 134-316. Noted consistent  hyperglycemia late in day with in target fasting BG. Noted receiving PD while hospitalized in evenings/overnight which could be contributing to late evening hyperglycemia (dextrose is dialysis fluid ) eveals 145-161. Noted once daily basal insulin ordered at PTA dose. Will continue with current Lantus dose for diabetes coverage- May consider one time NPH insulin to cover for dextrose in dialysis fluid if hyperglycemia is more pronounced. Management Rationale Action Plan   Medication   Basal needs Using 20% lower PTA basal dose  START Lantus 15 units daily     Nutritional needs NA    Corrective insulin Using HIGH sensitivity based on ESRD CONTINUE    Additional orders          Diabetes Discharge Plan   Medication     Referral  []        Outpatient diabetes education   Additional orders            Initial Presentation   Ambrose Lovett is a 75 y.o. female admitted  3/3/23  after experiencing fall on 3/1-hit head without LOC and was able to get self up. Pt did not seek for medical after immediately after the fall. Pt decided to seek for medical help when her RLE pain continue to get worse. LAB:  CXR: Pulmonary vascular congestion/ xray of right knee: without fracture  CT head: Head CT negative for acute intracranial process/ CT right LE: Diffuse soft tissue swelling of the right lower extremity.  Suspected large hematoma anterior to the patella and patellar tendon. Large predominantly fat density lesion in the right iliopsoas compartment  which is increased in size since 6/8/2011. This has no definite concerning  features on noncontrast CT and may simply reflect a lipoma. Duplex study: low extremity revealed no DVT    HX:   Past Medical History:   Diagnosis Date    Asthma     CAD (coronary artery disease)     CHF (congestive heart failure) ([facility])      ?    Chronic kidney disease     HD M-W-F [facility]on [phone]    COPD (chronic obstructive pulmonary disease) ([facility] [address]     PCP manages    Diabetes ([facility] [address]     DM2    GERD (gastroesophageal reflux disease)     Hypertension         INITIAL DX:   Right leg swelling [M79.89]  Right leg pain [M79.604]  Peritoneal dialysis catheter in place [facility]) [Z99.2]  Acute hypokalemia [E87.6]     Current Treatment     TX: empiric abx/ PD dialysis per nephrology    Hospital Course   Clinical progress has been complicated by Cellulitis vs. Compartment syndrome s/p fall TPA right LE.   3/3: ortho consulted/ blood cx pending/   Diabetes History   T2D- A1c 6.4%-improved since June 2022, A1C was 8.6%, Current A1C 6.4%    Diabetes-related Medical History  Neurological complications  ? Microvascular disease  [facility]blfxfjjeu-[facility] dialysis  Macrovascular disease  CAD  Other associated conditions     HF/ HTN/ COPD    Diabetes Medication History  Key Antihyperglycemic Medications               dulaglutide (Trulicity) 4.70 MX/6.0 mL sub-q pen (Taking) 0.75 mg by SubCUTAneous route every Monday. insulin glargine (LANTUS,BASAGLAR) 100 unit/mL (3 mL) inpn (Taking) 20 Units by SubCUTAneous route daily (with lunch). Diabetes self-management practices:   Eating pattern-\" I dont have much of an appetite these days\" -Verbalized she consumed 2 cups of cauliflower yesterday.  Appetite waxes and wanes  [x] Dentition status dentures  Physical activity pattern-none/    Monitoring pattern-\"my meter broke\"      Taking medications pattern  [x] Consistent administration  [x] Affordable  Reducing risks  [] Influenza: There is no immunization history on file for this patient. [] Pneumonia:   There is no immunization history on file for this patient. [] Hepatitis:   There is no immunization history on file for this patient. Social determinants of health impacting diabetes self-management practices   None voiced/observed  Overall evaluation:    [x] Achieving A1c target with drug therapy & self-care practices-A1C actually BELOW target for her duration of diabetes/ long term complications, ( ADA target for her, <7.5%)    Subjective   I don't eat\"     Objective   Physical exam  General Obese AA  female in no acute distress. Conversant and cooperative  Neuro  Alert, oriented   Vital Signs Visit Vitals  BP (!) 142/75 (BP 1 Location: Left upper arm, BP Patient Position: At rest;Lying)   Pulse 99   Temp 98.2 °F (36.8 °C)   Resp 16   Wt 105.9 kg (233 lb 7.5 oz)   SpO2 97%   BMI 38.85 kg/m²     Skin  Warm and dry. Heart   Regular rate and rhythm.  No murmurs, rubs or gallops  Lungs  Clear to auscultation without rales or rhonchi  Extremities No foot wounds        Laboratory  Recent Labs     03/06/23  0310 03/05/23  0307 03/04/23  0420   * 138* 211*   AGAP 8 8 7   WBC 7.4 7.6 8.6   CREA 4.76* 4.36* 5.03*         Factors impacting BG management  Factor Dose Comments   Nutrition:  Standard meals-?NPO     60 grams/meal      Pain prn    Infection NA    Other:   Kidney function  Liver function     ESRD-PD dependant        Blood glucose pattern    Significant diabetes-related events over the past 24-72 hours  T2D, a1c 6.4%  Fasting BG in target / elevated BG in late evening noted 2/2 dialysis fluid (has dextrose in it)      Assessment and Nursing Intervention   Nursing Diagnosis Risk for unstable blood glucose pattern   Nursing Intervention Domain 5257 Decision-making Support   Nursing Interventions Examined current inpatient diabetes/blood glucose control   Explored factors facilitating and impeding inpatient management  Explored corrective strategies with patient and responsible inpatient provider   Informed patient of rational for insulin strategy while hospitalized     Nursing Diagnosis 07945 Ineffective Health Management   Nursing Intervention Domain 4337 Decision-making Support   Nursing Interventions Identified diabetes self-management practices impeding diabetes control  Discussed diabetes survival skills related to  Healthy Plate eating plan; given handouts  Role of physical activity in improving insulin sensitivity and action  Procedure for blood glucose monitoring & options for low-cost products  Medications plan at discharge     Billing Code(s)    No charge    Before making these care recommendations, I personally reviewed the hospitalization record, including notes, laboratory & diagnostic data and current medications, and examined the patient at the bedside (circumstances permitting) before determining care. More than fifty (50) percent of the time was spent in patient counseling and/or care coordination.   Total minutes: <20    Tiffanie Fitting, CNS  Diabetes Clinical Nurse Specialist  [facility]  Access via [address] [place] [address]

## 2023-03-06 NOTE — PROGRESS NOTES
Problem: Mobility Impaired (Adult and Pediatric)  Goal: *Acute Goals and Plan of Care (Insert Text)  Description: FUNCTIONAL STATUS PRIOR TO ADMISSION: Patient was modified independent using a rolling walker and single point cane for functional mobility. Patient reports falls at home. HOME SUPPORT PRIOR TO ADMISSION: The patient lived with son but did not require assist.    Physical Therapy Goals  Initiated 3/3/2023  1. Patient will move from supine to sit and sit to supine  in bed with minimal assistance/contact guard assist within 7 day(s). 2.  Patient will transfer from bed to chair and chair to bed with minimal assistance/contact guard assist using the least restrictive device within 7 day(s). 3.  Patient will perform sit to stand with minimal assistance/contact guard assist within 7 day(s). 4.  Patient will ambulate with minimal assistance/contact guard assist for 50 feet with the least restrictive device within 7 day(s). 5.  Patient will ascend/descend 3 stairs with bilateral handrail(s) with minimal assistance/contact guard assist within 7 day(s). Outcome: Progressing Towards Goal     PHYSICAL THERAPY TREATMENT  Patient: Angelique Kemp (58 y.o. female)  Date: 3/6/2023  Diagnosis: Right leg swelling [M79.89]  Right leg pain [M79.604]  Peritoneal dialysis catheter in place [facility]) [Z99.2]  Acute hypokalemia [E87.6] <principal problem not specified>      Precautions: Fall  Chart, physical therapy assessment, plan of care and goals were reviewed. ASSESSMENT  Patient continues with skilled PT services and is progressing towards goals. Received pt resting in bed w/ LE's elevated on pillows voicing increased pain when removing the covers and with movement. Report received from RN who informed the immobilizer appears too small for pt. Adjusted immobilizer to fit pt's girth.   Pt is reporting overall improvement in pain tolerance with immobilizer support, able to ambulate 50 ft with the RW w/ step to gait.  BP is orthostatic supine to stand though pt asymptomatic and tolerated activity w/o c/o dizziness or light headedness. Pt remained up in the chair w/ LE's elevated. Plan to address steps next session as pt is able (3 to enter the home). Recommend gait training with a rollator if pt plans to resume ambulation w/ this at home vs obtaining a RW. Vitals:    03/06/23 1202 03/06/23 1237 03/06/23 1245 03/06/23 1251   BP: 130/63 120/69 110/64 127/68   BP 1 Location: Left upper arm      BP Patient Position: At rest;Lying Sitting; At rest  Comment: edge of bed Sitting; Other (Comment)  Comment: after walking Sitting, LE's elevated in recliner, at rest   Pulse: 80 83 92 82       Current Level of Function Impacting Discharge (mobility/balance): Min A for LE management getting OOB, CGA to stand from elevated bed height; CGA ambulating 50 ft w/ RW and right knee immobilizer. Other factors to consider for discharge: falls         PLAN :  Patient continues to benefit from skilled intervention to address the above impairments. Continue treatment per established plan of care. to address goals. Recommendation for discharge: (in order for the patient to meet his/her long term goals)  Physical therapy at least 2 days/week in the home     This discharge recommendation:  Has been made in collaboration with the attending provider and/or case management    IF patient discharges home will need the following DME: Pt owns rollator walker, may need RW for increased stability w/ the immobilizer       SUBJECTIVE:   Patient stated how long do you want me to sit up.     OBJECTIVE DATA SUMMARY:   Critical Behavior:  Neurologic State: Alert  Orientation Level: Oriented X4  Cognition: Appropriate decision making, Appropriate for age attention/concentration  Safety/Judgement: Awareness of environment  Functional Mobility Training:  Bed Mobility:  Rolling: Supervision  Supine to Sit: Minimum assistance; Other (comment) (for RLE management)  Sit to Supine: Other (comment) (remained in the chair)  Scooting: Supervision        Transfers:  Sit to Stand: Minimum assistance;Assist x1;Other (comment) (from elevated bed height) - assist for support to standing, cues for RLE management in brace and hand placement. Stand to Sit: Minimum assistance;Assist x1 (to chair) - assist for support and cues to slide RLE while lowering                             Balance:  Sitting: Intact  Standing: Impaired  Standing - Static: Good;Constant support  Standing - Dynamic : Good;Constant support  Ambulation/Gait Training:  Distance (ft): 50 Feet (ft)  Assistive Device: Gait belt;Walker, rolling  Ambulation - Level of Assistance: Contact guard assistance;Assist x1;Additional time; Adaptive equipment        Gait Abnormalities: Decreased step clearance; Step to gait        Base of Support: Widened;Shift to right  Stance: Right decreased  Speed/Maryann: Slow  Step Length: Left lengthened;Left shortened  Swing Pattern: Right asymmetrical     Cue for LE sequencing, to roll walker (vs picking it up), and for step to gait. Therapeutic Exercises:   Encouraged pt to sit in the chair for meals and to sit up at least an hour today and educated in benefit, to call for staff assist before getting up d/t her increased fall risk related to her pain and the brace, and plan for next sessions. Pain Rating:  Pt notes improvement in pain tolerance with immobilizer donned    Activity Tolerance:   Fair and signs and symptoms of orthostatic hypotension(asymptomatic)    After treatment patient left in no apparent distress:   Sitting in chair, Call bell within reach, and Chair alarm pad in place (not activated, RN aware and ok w/ this)    COMMUNICATION/COLLABORATION:   The patients plan of care was discussed with: Registered nurse.      Elaine Garcia, PT   Time Calculation: 39 mins

## 2023-03-07 LAB
ALBUMIN SERPL-MCNC: 1.5 G/DL (ref 3.5–5)
ANION GAP SERPL CALC-SCNC: 7 MMOL/L (ref 5–15)
APPEARANCE FLD: CLEAR
BASOPHILS # BLD: 0.1 K/UL (ref 0–0.1)
BASOPHILS NFR BLD: 1 % (ref 0–1)
BUN SERPL-MCNC: 22 MG/DL (ref 6–20)
BUN/CREAT SERPL: 5 (ref 12–20)
CALCIUM SERPL-MCNC: 7.2 MG/DL (ref 8.5–10.1)
CHLORIDE SERPL-SCNC: 95 MMOL/L (ref 97–108)
CO2 SERPL-SCNC: 32 MMOL/L (ref 21–32)
COLOR FLD: COLORLESS
CREAT SERPL-MCNC: 4.63 MG/DL (ref 0.55–1.02)
DIFFERENTIAL METHOD BLD: ABNORMAL
EOSINOPHIL # BLD: 0.4 K/UL (ref 0–0.4)
EOSINOPHIL NFR BLD: 6 % (ref 0–7)
ERYTHROCYTE [DISTWIDTH] IN BLOOD BY AUTOMATED COUNT: 14.6 % (ref 11.5–14.5)
GLUCOSE BLD STRIP.AUTO-MCNC: 130 MG/DL (ref 65–117)
GLUCOSE BLD STRIP.AUTO-MCNC: 169 MG/DL (ref 65–117)
GLUCOSE BLD STRIP.AUTO-MCNC: 212 MG/DL (ref 65–117)
GLUCOSE BLD STRIP.AUTO-MCNC: 281 MG/DL (ref 65–117)
GLUCOSE SERPL-MCNC: 240 MG/DL (ref 65–100)
HCT VFR BLD AUTO: 25.1 % (ref 35–47)
HGB BLD-MCNC: 7.4 G/DL (ref 11.5–16)
IMM GRANULOCYTES # BLD AUTO: 0 K/UL (ref 0–0.04)
IMM GRANULOCYTES NFR BLD AUTO: 0 % (ref 0–0.5)
LYMPHOCYTES # BLD: 1.3 K/UL (ref 0.8–3.5)
LYMPHOCYTES NFR BLD: 18 % (ref 12–49)
LYMPHOCYTES NFR FLD: 71 %
MAGNESIUM SERPL-MCNC: 2.1 MG/DL (ref 1.6–2.4)
MCH RBC QN AUTO: 29.2 PG (ref 26–34)
MCHC RBC AUTO-ENTMCNC: 29.5 G/DL (ref 30–36.5)
MCV RBC AUTO: 99.2 FL (ref 80–99)
MONOCYTES # BLD: 0.7 K/UL (ref 0–1)
MONOCYTES NFR BLD: 10 % (ref 5–13)
MONOS+MACROS NFR FLD: 13 %
NEUTROPHILS NFR FLD: 16 %
NEUTS SEG # BLD: 4.6 K/UL (ref 1.8–8)
NEUTS SEG NFR BLD: 65 % (ref 32–75)
NRBC # BLD: 0 K/UL (ref 0–0.01)
NRBC BLD-RTO: 0 PER 100 WBC
NUC CELL # FLD: 85 /CU MM
PHOSPHATE SERPL-MCNC: 4.2 MG/DL (ref 2.6–4.7)
PLATELET # BLD AUTO: 431 K/UL (ref 150–400)
PMV BLD AUTO: 9.3 FL (ref 8.9–12.9)
POTASSIUM SERPL-SCNC: 4.1 MMOL/L (ref 3.5–5.1)
RBC # BLD AUTO: 2.53 M/UL (ref 3.8–5.2)
RBC # FLD: 4 /CU MM
SERVICE CMNT-IMP: ABNORMAL
SODIUM SERPL-SCNC: 134 MMOL/L (ref 136–145)
SPECIMEN SOURCE FLD: ABNORMAL
WBC # BLD AUTO: 7 K/UL (ref 3.6–11)

## 2023-03-07 PROCEDURE — 36415 COLL VENOUS BLD VENIPUNCTURE: CPT

## 2023-03-07 PROCEDURE — 74011636637 HC RX REV CODE- 636/637: Performed by: CLINICAL NURSE SPECIALIST

## 2023-03-07 PROCEDURE — 94640 AIRWAY INHALATION TREATMENT: CPT

## 2023-03-07 PROCEDURE — 74011250637 HC RX REV CODE- 250/637: Performed by: NURSE PRACTITIONER

## 2023-03-07 PROCEDURE — 97116 GAIT TRAINING THERAPY: CPT

## 2023-03-07 PROCEDURE — 74011250636 HC RX REV CODE- 250/636: Performed by: NURSE PRACTITIONER

## 2023-03-07 PROCEDURE — 74011000258 HC RX REV CODE- 258: Performed by: STUDENT IN AN ORGANIZED HEALTH CARE EDUCATION/TRAINING PROGRAM

## 2023-03-07 PROCEDURE — 85025 COMPLETE CBC W/AUTO DIFF WBC: CPT

## 2023-03-07 PROCEDURE — 90945 DIALYSIS ONE EVALUATION: CPT

## 2023-03-07 PROCEDURE — 82962 GLUCOSE BLOOD TEST: CPT

## 2023-03-07 PROCEDURE — 65270000046 HC RM TELEMETRY

## 2023-03-07 PROCEDURE — 99231 SBSQ HOSP IP/OBS SF/LOW 25: CPT | Performed by: CLINICAL NURSE SPECIALIST

## 2023-03-07 PROCEDURE — 80069 RENAL FUNCTION PANEL: CPT

## 2023-03-07 PROCEDURE — 89050 BODY FLUID CELL COUNT: CPT

## 2023-03-07 PROCEDURE — 97530 THERAPEUTIC ACTIVITIES: CPT

## 2023-03-07 PROCEDURE — A4726 DIALYS SOL FLD VOL > 5999CC: HCPCS | Performed by: NURSE PRACTITIONER

## 2023-03-07 PROCEDURE — 74011250636 HC RX REV CODE- 250/636: Performed by: INTERNAL MEDICINE

## 2023-03-07 PROCEDURE — A4722 DIALYS SOL FLD VOL > 1999CC: HCPCS | Performed by: NURSE PRACTITIONER

## 2023-03-07 PROCEDURE — 74011250637 HC RX REV CODE- 250/637: Performed by: INTERNAL MEDICINE

## 2023-03-07 PROCEDURE — 83735 ASSAY OF MAGNESIUM: CPT

## 2023-03-07 PROCEDURE — 74011000250 HC RX REV CODE- 250: Performed by: NURSE PRACTITIONER

## 2023-03-07 PROCEDURE — 74011250636 HC RX REV CODE- 250/636: Performed by: STUDENT IN AN ORGANIZED HEALTH CARE EDUCATION/TRAINING PROGRAM

## 2023-03-07 RX ORDER — FLUCONAZOLE 100 MG/1
200 TABLET ORAL EVERY 24 HOURS
Status: DISCONTINUED | OUTPATIENT
Start: 2023-03-07 | End: 2023-03-08

## 2023-03-07 RX ADMIN — OXYCODONE HYDROCHLORIDE 5 MG: 5 TABLET ORAL at 22:03

## 2023-03-07 RX ADMIN — ROSUVASTATIN 10 MG: 10 TABLET, FILM COATED ORAL at 08:39

## 2023-03-07 RX ADMIN — HEPARIN SODIUM 5000 UNITS: 5000 INJECTION INTRAVENOUS; SUBCUTANEOUS at 23:57

## 2023-03-07 RX ADMIN — GENTAMICIN SULFATE: 1 CREAM TOPICAL at 08:49

## 2023-03-07 RX ADMIN — SODIUM CHLORIDE, SODIUM LACTATE, CALCIUM CHLORIDE, MAGNESIUM CHLORIDE AND DEXTROSE 2000 ML: 2.5; 538; 448; 18.3; 5.08 INJECTION, SOLUTION INTRAPERITONEAL at 12:16

## 2023-03-07 RX ADMIN — MAGNESIUM OXIDE 400 MG (241.3 MG MAGNESIUM) TABLET 400 MG: TABLET at 08:38

## 2023-03-07 RX ADMIN — HYDRALAZINE HYDROCHLORIDE 50 MG: 50 TABLET, FILM COATED ORAL at 22:03

## 2023-03-07 RX ADMIN — OXYCODONE HYDROCHLORIDE 5 MG: 5 TABLET ORAL at 05:20

## 2023-03-07 RX ADMIN — DULOXETINE HYDROCHLORIDE 20 MG: 20 CAPSULE, DELAYED RELEASE ORAL at 08:39

## 2023-03-07 RX ADMIN — Medication 3 UNITS: at 22:03

## 2023-03-07 RX ADMIN — FLUCONAZOLE 200 MG: 100 TABLET ORAL at 16:30

## 2023-03-07 RX ADMIN — ASPIRIN 325 MG ORAL TABLET 325 MG: 325 PILL ORAL at 08:38

## 2023-03-07 RX ADMIN — HYDRALAZINE HYDROCHLORIDE 50 MG: 50 TABLET, FILM COATED ORAL at 16:20

## 2023-03-07 RX ADMIN — FUROSEMIDE 80 MG: 40 TABLET ORAL at 08:39

## 2023-03-07 RX ADMIN — HEPARIN SODIUM: 1000 INJECTION INTRAVENOUS; SUBCUTANEOUS at 16:53

## 2023-03-07 RX ADMIN — ARFORMOTEROL TARTRATE: 15 SOLUTION RESPIRATORY (INHALATION) at 07:56

## 2023-03-07 RX ADMIN — GENTAMICIN SULFATE: 1 CREAM TOPICAL at 12:21

## 2023-03-07 RX ADMIN — EPOETIN ALFA-EPBX 20000 UNITS: 20000 INJECTION, SOLUTION INTRAVENOUS; SUBCUTANEOUS at 22:03

## 2023-03-07 RX ADMIN — CARVEDILOL 25 MG: 12.5 TABLET, FILM COATED ORAL at 19:16

## 2023-03-07 RX ADMIN — OXYCODONE HYDROCHLORIDE 5 MG: 5 TABLET ORAL at 16:30

## 2023-03-07 RX ADMIN — PIPERACILLIN AND TAZOBACTAM 3.38 G: 3; .375 INJECTION, POWDER, LYOPHILIZED, FOR SOLUTION INTRAVENOUS at 07:34

## 2023-03-07 RX ADMIN — DULOXETINE HYDROCHLORIDE 20 MG: 20 CAPSULE, DELAYED RELEASE ORAL at 19:17

## 2023-03-07 RX ADMIN — HYDRALAZINE HYDROCHLORIDE 50 MG: 50 TABLET, FILM COATED ORAL at 08:39

## 2023-03-07 RX ADMIN — DICLOFENAC 2 G: 10 GEL TOPICAL at 08:44

## 2023-03-07 RX ADMIN — INSULIN GLARGINE 15 UNITS: 100 INJECTION, SOLUTION SUBCUTANEOUS at 08:37

## 2023-03-07 RX ADMIN — AMLODIPINE BESYLATE 10 MG: 5 TABLET ORAL at 08:38

## 2023-03-07 RX ADMIN — Medication 2 UNITS: at 08:38

## 2023-03-07 RX ADMIN — CARVEDILOL 25 MG: 12.5 TABLET, FILM COATED ORAL at 08:00

## 2023-03-07 RX ADMIN — ARFORMOTEROL TARTRATE: 15 SOLUTION RESPIRATORY (INHALATION) at 20:25

## 2023-03-07 RX ADMIN — HEPARIN SODIUM 5000 UNITS: 5000 INJECTION INTRAVENOUS; SUBCUTANEOUS at 12:17

## 2023-03-07 RX ADMIN — FUROSEMIDE 80 MG: 40 TABLET ORAL at 19:17

## 2023-03-07 RX ADMIN — HEPARIN SODIUM 5000 UNITS: 5000 INJECTION INTRAVENOUS; SUBCUTANEOUS at 00:36

## 2023-03-07 RX ADMIN — MONTELUKAST 10 MG: 10 TABLET, FILM COATED ORAL at 22:02

## 2023-03-07 NOTE — DIALYSIS
Peritoneal Dialysis Disconnection / [phone]     Metrics   I-Drain: 3   Total UF: 808   Last Fill: 1000 for specimen collection   Last Manual Drain: 1094   Net UF:         26   Avg Dwell Time: 1:14   Lost Dwell Time: 0:48   Alarms: No recorded alarms   Effluent: Clear;yellow visualized in toilet     Access   Type & Location: Mid abdomen,    Comments: Prior to disconnection, one-minute Alcavis scrub performed, followed by one minute soak per policy. Sterile mini cap applied and secured to abdomen. Dsg clean, dry and intact. Dsg date: 03/06/23                                           Comments:   Treatment completed after two hour dwell of 1000 manual drain for specimen collection, delivered to lab.   Patient with reported good response to Lactulose

## 2023-03-07 NOTE — PROGRESS NOTES
[facility]   [address] [facility], [address] [facility]  Phone: [phone]   [phone]    [email or web address]     Nephrology Progress Note    Patient Name : Ivette Sampson      : 1955     MRN : [id number]  Date of Admission : 3/3/2023  Date of Servive : 23    CC: Follow up for ESRD       Assessment and Plan   ESRD-PD :  - CCPD, [facility], Dr. Roopa Sesay  - cont CCPD , with add day dwell @ 12 noon -4 hours  -  daily PD fluid cell count   -  3/3 PD gram (-) culture (diptheroids)  - on empiric Vanco/Rocephin  - Increased Miralax BID continue with senna Docusate  - Gentamicin cream on PD site  - Daily labs    Volume overloaded  -Intensified UF with PD (using 4.25% on top of 2.5% PD fluid)  -Cont Lasix 80 IV BID    Hypokalemia: Chronic  - Increase KCL supplements BID    Hypomagnesemia  -replete accordingly    Anemia in CKD  -Hb 7.4 G/dl improving  -Tsat 16,ferritin 906 -- no IV iron currently  -MADY 20K     MBD of CKD  - corca stable  - Phos stable    S/p GLF  RLE cellulitis  -Consulted Ortho    DM2  -MX per primary team    HTN  -Continue home meds    Hypoalbuminemia  - would benefit from protein supplement (nephro or liqucel)     CAD  HFpEF  GERD       Interval History:  Patient seen and examined this AM. We where unable to do manual yesterday due to constipation. She has had 3 large bowel movements/ 24 hrs. PD went well with >1L removed plan to continue with current regimen. Repeat cell count pending. Review of Systems: A comprehensive review of systems was negative except for that written in the HPI.     Current Medications:   Current Facility-Administered Medications   Medication Dose Route Frequency    piperacillin-tazobactam (ZOSYN) 3.375 g in 0.9% sodium chloride (MBP/ADV) 100 mL MBP  3.375 g IntraVENous Q12H    polyethylene glycol (MIRALAX) packet 17 g  17 g Oral BID    potassium bicarb-citric acid (EFFER-K) tablet 40 mEq  40 mEq Oral BID peritoneal dialysis DEXTROSE 2.5% (2.5 mEq/L low calcium) solution 2,000 mL  2,000 mL IntraPERitoneal DAILY    gentamicin (GARAMYCIN) 0.1 % cream   Topical BID    senna-docusate (PERICOLACE) 8.6-50 mg per tablet 1 Tablet  1 Tablet Oral DAILY    peritoneal dialysis DEXTROSE 4.25% (2.5 mEq/L low calcium) 6,000 mL with heparin (porcine) 3,000 Units   IntraPERitoneal DAILY    vancomycin - pharmacy to dose    Other Rx Dosing/Monitoring    peritoneal dialysis DEXTROSE 2.5% (2.5 mEq/L low calcium) 6,000 mL with heparin (porcine) 3,000 Units   IntraPERitoneal DAILY    peritoneal dialysis DEXTROSE 2.5% (2.5 mEq/L low calcium) 6,000 mL with heparin (porcine) 3,000 Units   IntraPERitoneal DAILY    epoetin joni-epbx (RETACRIT) injection 20,000 Units  20,000 Units SubCUTAneous Q TUE, THU & SAT    magnesium oxide (MAG-OX) tablet 400 mg  400 mg Oral DAILY    morphine injection 2 mg  2 mg IntraVENous Q3H PRN    oxyCODONE IR (ROXICODONE) tablet 5 mg  5 mg Oral Q6H PRN    albuterol-ipratropium (DUO-NEB) 2.5 MG-0.5 MG/3 ML  3 mL Nebulization Q6H PRN    amLODIPine (NORVASC) tablet 10 mg  10 mg Oral DAILY    aspirin tablet 325 mg  325 mg Oral DAILY    carvediloL (COREG) tablet 25 mg  25 mg Oral BID WITH MEALS    diclofenac (VOLTAREN) 1 % topical gel 2 g  2 g Topical DAILY    DULoxetine (CYMBALTA) capsule 20 mg  20 mg Oral BID    hydrALAZINE (APRESOLINE) tablet 50 mg  50 mg Oral TID    arformoterol 15 mcg/budesonide 0.5 mg neb solution   Nebulization BID RT    montelukast (SINGULAIR) tablet 10 mg  10 mg Oral QHS    rosuvastatin (CRESTOR) tablet 10 mg  10 mg Oral DAILY    ondansetron (ZOFRAN ODT) tablet 4 mg  4 mg Oral Q8H PRN    acetaminophen (TYLENOL) tablet 650 mg  650 mg Oral Q6H PRN    glucose chewable tablet 16 g  4 Tablet Oral PRN    glucagon (GLUCAGEN) injection 1 mg  1 mg IntraMUSCular PRN    dextrose 10 % infusion 0-250 mL  0-250 mL IntraVENous PRN    heparin (porcine) injection 5,000 Units  5,000 Units SubCUTAneous Q12H furosemide (LASIX) tablet 80 mg  80 mg Oral BID    insulin lispro (HUMALOG) injection   SubCUTAneous AC&HS    insulin glargine (LANTUS) injection 15 Units  15 Units SubCUTAneous DAILY    gentamicin (GARAMYCIN) 0.1 % cream   Topical DIALYSIS PRN      Allergies   Allergen Reactions    Ivp Dye [Fd And C Blue No.1] Hives       Objective:  Vitals:    Vitals:    03/07/23 0438 03/07/23 0538 03/07/23 0556 03/07/23 0730   BP: (!) 141/90   (!) 155/69   Pulse: 93  (!) 101 93   Resp: 20      Temp: 98.1 °F (36.7 °C)      SpO2: 98%      Weight:  104.2 kg (229 lb 11.5 oz)       Intake and Output:  03/07 0701 - 03/07 1900  In: -   Out: 783   03/05 1901 - 03/07 0700  In: -   Out: -713     Physical Examination:  General: Obese  Neck:  Supple, no mass  Resp:  Diminished at bases   CV:  RRR,  no murmur or rub, pitting R>L LE edema  GI:  PD catheter exit site clean  Neurologic:  Non focal  Psych:             AAO x 3 appropriate affect   Extremities: RLE tenderness around knee with bruising and swelling  :        []    High complexity decision making was performed  []    Patient is at high-risk of decompensation with multiple organ involvement    Lab Data Personally Reviewed: I have reviewed all the pertinent labs, microbiology data and radiology studies during assessment.     Recent Labs     03/07/23 0525 03/06/23 0310 03/05/23  0307   * 136 140   K 4.1 3.3* 3.1*   CL 95* 94* 101   CO2 32 34* 31   * 168* 138*   BUN 22* 22* 23*   CREA 4.63* 4.76* 4.36*   CA 7.2* 7.5* 6.2*   MG 2.1  --  1.9   PHOS 4.2 4.4 4.1   ALB 1.5*  --  1.3*       Recent Labs     03/07/23  0525 03/06/23  0310 03/05/23  1435 03/05/23  0307   WBC 7.0 7.4  --  7.6   HGB 7.4* 7.3* 7.3* 7.0*   HCT 25.1* 24.0* 24.5* 23.8*   * 375  --  352       No results found for: SDES  Lab Results   Component Value Date/Time    Culture result: FEW DIPHTHEROIDS (A) 03/03/2023 05:26 PM    Culture result: NO GROWTH 4 DAYS 03/03/2023 05:32 AM    Culture result: MIXED UROGENITAL STANLEY ISOLATED 05/17/2022 09:52 AM    Culture result: NO GROWTH 5 DAYS 05/15/2022 03:51 AM    Culture result: NO GROWTH 5 DAYS 03/15/2018 06:09 PM     Recent Results (from the past 24 hour(s))   GLUCOSE, POC    Collection Time: 03/06/23 12:01 PM   Result Value Ref Range    Glucose (POC) 129 (H) 65 - 117 mg/dL    Performed by Derrick Tripp, POC    Collection Time: 03/06/23  4:46 PM   Result Value Ref Range    Glucose (POC) 123 (H) 65 - 117 mg/dL    Performed by Chad Evans    GLUCOSE, POC    Collection Time: 03/06/23 10:30 PM   Result Value Ref Range    Glucose (POC) 278 (H) 65 - 117 mg/dL    Performed by Emily Ronquillo    RENAL FUNCTION PANEL    Collection Time: 03/07/23  5:25 AM   Result Value Ref Range    Sodium 134 (L) 136 - 145 mmol/L    Potassium 4.1 3.5 - 5.1 mmol/L    Chloride 95 (L) 97 - 108 mmol/L    CO2 32 21 - 32 mmol/L    Anion gap 7 5 - 15 mmol/L    Glucose 240 (H) 65 - 100 mg/dL    BUN 22 (H) 6 - 20 MG/DL    Creatinine 4.63 (H) 0.55 - 1.02 MG/DL    BUN/Creatinine ratio 5 (L) 12 - 20      eGFR 10 (L) >60 ml/min/1.73m2    Calcium 7.2 (L) 8.5 - 10.1 MG/DL    Phosphorus 4.2 2.6 - 4.7 MG/DL    Albumin 1.5 (L) 3.5 - 5.0 g/dL   MAGNESIUM    Collection Time: 03/07/23  5:25 AM   Result Value Ref Range    Magnesium 2.1 1.6 - 2.4 mg/dL   CBC WITH AUTOMATED DIFF    Collection Time: 03/07/23  5:25 AM   Result Value Ref Range    WBC 7.0 3.6 - 11.0 K/uL    RBC 2.53 (L) 3.80 - 5.20 M/uL    HGB 7.4 (L) 11.5 - 16.0 g/dL    HCT 25.1 (L) 35.0 - 47.0 %    MCV 99.2 (H) 80.0 - 99.0 FL    MCH 29.2 26.0 - 34.0 PG    MCHC 29.5 (L) 30.0 - 36.5 g/dL    RDW 14.6 (H) 11.5 - 14.5 %    PLATELET 443 (H) 727 - 400 K/uL    MPV 9.3 8.9 - 12.9 FL    NRBC 0.0 0  WBC    ABSOLUTE NRBC 0.00 0.00 - 0.01 K/uL    NEUTROPHILS 65 32 - 75 %    LYMPHOCYTES 18 12 - 49 %    MONOCYTES 10 5 - 13 %    EOSINOPHILS 6 0 - 7 %    BASOPHILS 1 0 - 1 %    IMMATURE GRANULOCYTES 0 0.0 - 0.5 %    ABS.  NEUTROPHILS 4.6 1.8 - 8.0 K/UL    ABS. LYMPHOCYTES 1.3 0.8 - 3.5 K/UL    ABS. MONOCYTES 0.7 0.0 - 1.0 K/UL    ABS. EOSINOPHILS 0.4 0.0 - 0.4 K/UL    ABS. BASOPHILS 0.1 0.0 - 0.1 K/UL    ABS. IMM. GRANS. 0.0 0.00 - 0.04 K/UL    DF AUTOMATED     GLUCOSE, POC    Collection Time: 03/07/23  6:29 AM   Result Value Ref Range    Glucose (POC) 212 (H) 65 - 117 mg/dL    Performed by Otoniel Michel  PCT            I have reviewed the flowsheets. Chart and Pertinent Notes have been reviewed. No change in PMH ,family and social history from Consult note.       Brando Farmer NP  [facility]

## 2023-03-07 NOTE — PROGRESS NOTES
Attempted to see the patient. Currently getting PD dialysis. Will follow up later today vs tomorrow.     Thank you  Edmond Benton MS OTR/L

## 2023-03-07 NOTE — PROGRESS NOTES
Physician Progress Note      Yimi ALBRIGHT #:                  [id number]  :                       1955  ADMIT DATE:       3/3/2023 3:25 AM  DISCH DATE:  RESPONDING  PROVIDER #:        Heather Torres MD          QUERY TEXT:    Dear Attending,    Pt admitted with RLE cellulitis post fall. Pt noted to have DM 2 uncontrolled. If possible, please document in progress notes and discharge summary the relationship, if any, between cellulitis and DM. The medical record reflects the following:  Risk Factors: S/p fall 3/1, CKD HTN, DM 2 CHF  Clinical Indicators: RLE erythematous and very warm, pain and swelling, afebrile, wbc normal  ?? BC (3/3) negative so far, CRP 11.40, BS trend 322, 316, 244  Treatment: empiric vancomycin and ceftriaxone, Zosyn, IVFs, Insulin, Glucose monitoring    Please call if you have any questions or need assistance. I can also be reached via [facility] or [facility] # [phone]. Thank you,  Vicki Lai, [facility]  O: [phone]  Options provided:  -- RLE cellulitis associated with Diabetes  -- RLE cellulitis unrelated to Diabetes  -- Other - I will add my own diagnosis  -- Disagree - Not applicable / Not valid  -- Disagree - Clinically unable to determine / Unknown  -- Refer to Clinical Documentation Reviewer    PROVIDER RESPONSE TEXT:    RLE cellulitis unrelated to Diabetes. Query created by:  Breann Walker on 3/7/2023 7:10 AM      Electronically signed by:  Heather Torres MD 3/7/2023 4:53 PM

## 2023-03-07 NOTE — CONSULTS
Infectious Disease Consult Note    Reason for Consult:   Date of Consultation: 2023  Date of Admission: 3/3/2023  Referring Physician:       HPI:  Stevie Buchanan is a 75y.o. year old female with history of ***. Past Medical History:  Past Medical History:   Diagnosis Date    Asthma     CAD (coronary artery disease)     CHF (congestive heart failure) ([facility])      ?    Chronic kidney disease     HD -- [facility] [phone]    COPD (chronic obstructive pulmonary disease) ([facility] [address]     PCP manages    Diabetes ([facility] [address]     DM2    GERD (gastroesophageal reflux disease)     Hypertension          Surgical History:  Past Surgical History:   Procedure Laterality Date    COLONOSCOPY N/A 2023    COLONOSCOPY performed by Sonia Choi MD at [facility] ENDOSCOPY    DELIVERY       HX CORONARY STENT PLACEMENT      multiple pt unsure of how many    HX HYSTERECTOMY      IR INSERT NON TUNL CVC OVER 5 YRS  2021    IR INSERT TUNL CVC W/O PORT OVER 5 YR  2021    peritoneal dialysis    FL UNLISTED PROCEDURE CARDIAC SURGERY      FL UNLISTED PROCEDURE VASCULAR SURGERY      leg stent         Family History:   Family History   Problem Relation Age of Onset    Heart Disease Mother     Cancer Father          Social History:     Living Situation:  Tobacco:  Alcohol:  Illicit Drugs:  Sexual History:   Travel History  Exposures:   Outdoor/Hiking: denied  Animal/Pet: Dogs/ Cats   Construction: denied  Hot Tub: denied   Brackish/stagnant exposure: denied  TB exposure: denied  Sick Contacts: denied         Allergies: Allergies   Allergen Reactions    Ivp Dye [Fd And C Blue No.1] Hives         Review of Systems:     Negative except as in HPI    Medications:  No current facility-administered medications on file prior to encounter. Current Outpatient Medications on File Prior to Encounter   Medication Sig Dispense Refill    rosuvastatin (CRESTOR) 40 mg tablet Take 40 mg by mouth daily.       albuterol-ipratropium (DUO-NEB) 2.5 mg-0.5 mg/3 ml nebu 3 mL by Nebulization route every six (6) hours as needed for Wheezing. albuterol (PROVENTIL HFA, VENTOLIN HFA, PROAIR HFA) 90 mcg/actuation inhaler Take 2 Puffs by inhalation every six (6) hours as needed for Wheezing. epoetin joni 20,000 unit/mL soln 20,000 Units, epoetin joni 10,000 unit/mL soln 10,000 Units 30,000 Units by SubCUTAneous route. Twice a month with PD      amLODIPine (NORVASC) 10 mg tablet Take 10 mg by mouth daily. diclofenac (VOLTAREN) 1 % gel Apply  to affected area daily. DULoxetine (CYMBALTA) 20 mg capsule Take 20 mg by mouth two (2) times a day. furosemide (LASIX) 40 mg tablet Take 80 mg by mouth two (2) times a day. carvediloL (COREG) 25 mg tablet Take 25 mg by mouth two (2) times daily (with meals). dulaglutide (Trulicity) 1.60 RI/9.5 mL sub-q pen 0.75 mg by SubCUTAneous route every Monday. insulin glargine (LANTUS,BASAGLAR) 100 unit/mL (3 mL) inpn 20 Units by SubCUTAneous route daily (with lunch). hydrALAZINE (APRESOLINE) 50 mg tablet Take 1 Tablet by mouth three (3) times daily. 90 Tablet 0    montelukast (SINGULAIR) 10 mg tablet Take 1 Tab by mouth nightly. 30 Tab 0    potassium chloride (K-DUR, KLOR-CON M20) 20 mEq tablet Take 20 mEq by mouth daily. aspirin (ASPIRIN) 325 mg tablet Take 325 mg by mouth daily.              Physical Exam:    Vitals: Patient Vitals for the past 24 hrs:   Temp Pulse Resp BP SpO2   03/07/23 0835 98.9 °F (37.2 °C) 92 18 130/69 96 %   03/07/23 0730 -- 93 -- (!) 155/69 --   03/07/23 0556 -- (!) 101 -- -- --   03/07/23 0438 98.1 °F (36.7 °C) 93 20 (!) 141/90 98 %   03/07/23 0400 -- 90 -- -- --   03/07/23 0200 -- 84 -- -- --   03/07/23 0000 97.9 °F (36.6 °C) 87 20 (!) 152/65 100 %   03/06/23 2236 -- 85 -- (!) 141/65 --   03/06/23 2200 -- 74 -- -- --   03/06/23 2044 -- -- -- -- 97 %   03/06/23 2001 -- 93 -- -- --   03/06/23 2000 97.9 °F (36.6 °C) 90 18 138/86 98 %   03/06/23 1925 97.9 °F (36.6 °C) 90 18 (!) 141/79 96 %   03/06/23 1845 97 °F (36.1 °C) 88 -- (!) 146/67 --   03/06/23 1817 -- 84 -- -- --   03/06/23 1733 -- -- -- 118/66 --   03/06/23 1540 98 °F (36.7 °C) 85 17 (!) 111/59 99 %   03/06/23 1402 -- 90 -- -- --   03/06/23 1348 -- 81 -- -- --   03/06/23 1251 -- 82 -- 127/68 --   03/06/23 1245 -- 92 -- 110/64 --   03/06/23 1237 -- 83 -- 120/69 --   03/06/23 1202 98.2 °F (36.8 °C) 80 16 130/63 100 %   03/06/23 1020 -- 83 -- -- --     GEN: NAD  HEENT: Normocephalic, atraumatic, PERRL, no scleral icterus,  no cervical, no lymphadenopathy, no sinus tenderness, no thrush  CV: Hemodynamically stable  Lungs: Breathing comfortably  Abdomen: soft, non distended, non tender,  Genitourinary:  no christine  Extremities: no edema  Neuro: Alert, oriented to time, place and situation, moves all extremities to commands, verbal   Skin: no rash  Psych: good affect, good eye contact, non tearful   Lines:       Labs:   Recent Results (from the past 24 hour(s))   GLUCOSE, POC    Collection Time: 03/06/23 12:01 PM   Result Value Ref Range    Glucose (POC) 129 (H) 65 - 117 mg/dL    Performed by Allan Vanessa, POC    Collection Time: 03/06/23  4:46 PM   Result Value Ref Range    Glucose (POC) 123 (H) 65 - 117 mg/dL    Performed by Valeria Robleroal    GLUCOSE, POC    Collection Time: 03/06/23 10:30 PM   Result Value Ref Range    Glucose (POC) 278 (H) 65 - 117 mg/dL    Performed by Daniele Travis    RENAL FUNCTION PANEL    Collection Time: 03/07/23  5:25 AM   Result Value Ref Range    Sodium 134 (L) 136 - 145 mmol/L    Potassium 4.1 3.5 - 5.1 mmol/L    Chloride 95 (L) 97 - 108 mmol/L    CO2 32 21 - 32 mmol/L    Anion gap 7 5 - 15 mmol/L    Glucose 240 (H) 65 - 100 mg/dL    BUN 22 (H) 6 - 20 MG/DL    Creatinine 4.63 (H) 0.55 - 1.02 MG/DL    BUN/Creatinine ratio 5 (L) 12 - 20      eGFR 10 (L) >60 ml/min/1.73m2    Calcium 7.2 (L) 8.5 - 10.1 MG/DL    Phosphorus 4.2 2.6 - 4.7 MG/DL    Albumin 1.5 (L) 3.5 - 5.0 g/dL   MAGNESIUM    Collection Time: 03/07/23  5:25 AM   Result Value Ref Range    Magnesium 2.1 1.6 - 2.4 mg/dL   CBC WITH AUTOMATED DIFF    Collection Time: 03/07/23  5:25 AM   Result Value Ref Range    WBC 7.0 3.6 - 11.0 K/uL    RBC 2.53 (L) 3.80 - 5.20 M/uL    HGB 7.4 (L) 11.5 - 16.0 g/dL    HCT 25.1 (L) 35.0 - 47.0 %    MCV 99.2 (H) 80.0 - 99.0 FL    MCH 29.2 26.0 - 34.0 PG    MCHC 29.5 (L) 30.0 - 36.5 g/dL    RDW 14.6 (H) 11.5 - 14.5 %    PLATELET 205 (H) 434 - 400 K/uL    MPV 9.3 8.9 - 12.9 FL    NRBC 0.0 0  WBC    ABSOLUTE NRBC 0.00 0.00 - 0.01 K/uL    NEUTROPHILS 65 32 - 75 %    LYMPHOCYTES 18 12 - 49 %    MONOCYTES 10 5 - 13 %    EOSINOPHILS 6 0 - 7 %    BASOPHILS 1 0 - 1 %    IMMATURE GRANULOCYTES 0 0.0 - 0.5 %    ABS. NEUTROPHILS 4.6 1.8 - 8.0 K/UL    ABS. LYMPHOCYTES 1.3 0.8 - 3.5 K/UL    ABS. MONOCYTES 0.7 0.0 - 1.0 K/UL    ABS. EOSINOPHILS 0.4 0.0 - 0.4 K/UL    ABS. BASOPHILS 0.1 0.0 - 0.1 K/UL    ABS. IMM. GRANS. 0.0 0.00 - 0.04 K/UL    DF AUTOMATED     GLUCOSE, POC    Collection Time: 03/07/23  6:29 AM   Result Value Ref Range    Glucose (POC) 212 (H) 65 - 117 mg/dL    Performed by [facility]        Microbiology Data: In HPI    Pathology Results:        Assessment:              Recommendations: Thank for the opportunity to participate in the care of this patient. Please contact with questions or concerns.            Adeline Montiel MD  Infectious Diseases

## 2023-03-07 NOTE — PROGRESS NOTES
Problem: Pressure Injury - Risk of  Goal: *Prevention of pressure injury  Description: Document Waldo Scale and appropriate interventions in the flowsheet.   Outcome: Progressing Towards Goal  Note: Pressure Injury Interventions:       Moisture Interventions: Absorbent underpads    Activity Interventions: Increase time out of bed    Mobility Interventions: HOB 30 degrees or less    Nutrition Interventions: Offer support with meals,snacks and hydration    Problem: Pain  Goal: *Control of Pain  Outcome: Progressing Towards Goal

## 2023-03-07 NOTE — PROGRESS NOTES
Pharmacy Note     Fluconazole 150 mg po daily ordered for treatment of IAI. Per [address] [facility]al Policy, Fluconazole will be changed to 200 mg po dailiy. Estimated Creatinine Clearance: Estimated Creatinine Clearance: 13.9 mL/min (A) (based on SCr of 4.63 mg/dL (H)). Dialysis Status, BRENNAN, CKD: PD    BMI:  Body mass index is 38.23 kg/m². Recent Labs     23  0525 23  0310 23  0307   WBC 7.0 7.4 7.6     Temp (24hrs), Av °F (36.7 °C), Min:97 °F (36.1 °C), Max:98.9 °F (37.2 °C)      Rationale for Adjustment:  Renal dosing is because drug is renally eliminated. Pharmacy will continue to monitor and adjust dose as necessary. Please call with any questions.     Thank you,  Jonatan Rader

## 2023-03-07 NOTE — PROGRESS NOTES
Patient seen and examined. D/w Dr. Milan Naqvi from Nephrology. Can stop IV vancomycin (right knee is not appearing infected ) and switch to intraperitoneal vancomycin for a total of 2 weeks.        Davion Becerra MD  Infectious Diseases

## 2023-03-07 NOTE — PROGRESS NOTES
Transition Of Care:   Noted, Infectious disease consult pending. The patient is a PD dialysis patient at [facility] clinic. The patient plans to discharge home with [facility] home [facility] and family to transport when stable for discharge. RUR: 18%     Peritoneal D[facility] [place]    [facility] [place] [address]  Phone: [phone]  Fax: [phone]    Patient requested to apply for medicaid secondary. CM sent [employer] email request and gave the patient [employer] website information to apply online. DME for CM consult placed for knee immobilizer. The patient has a knee immobilizer and states it is too small. CM informed nurse to inquire with ortho floor or ortho team to get bigger size. CM following for discharge needs.      Jett Alejandra RN/CRM

## 2023-03-07 NOTE — PROGRESS NOTES
[facility]  Hospitalist Group                                                                                          Hospitalist Progress Note  Dasia Larios NP  Answering service: [phone] OR [phone] from in house phone        Date of Service:  3/7/2023  NAME:  Aura Salazar  :  1955  MRN:  [id number]       Admission Summary:   Aura Salazar is a 75 y.o. female who presents with medical hx of recent fall, hypertension, type II dm, CAD, s/p PCI, CHF, CKD on PD exchange, COPD, GERD was admitted to the hospital on 3/3 with RLE pain and swelling. Pt fell on 3/1, hit her head but no LOC. Pt was able get back up after the fall, was able to move around initially. Pt did not seek for medical after immediately after the fall. Pt decided to seek for medical help when her RLE pain continue to get worse. Interval history / Subjective:   Asking about discharge plan and how she would complete the IV abx therapy. Pt has been informed that final abx therapy will be provided by infectious disease team     Assessment & Plan:     RLE pain due to torn ACL and meniscus  ? Cellulitis - diffuse erythema/tenderness resolving  S/p fall  - afebrile, wbc normal    BC (3/3) negative so far    MRI of RLE (3/6)  1. Torn ACL  2. Complex vertical tear peripherally body and posterior horn medial meniscus. Marked volume loss within the body  3. Complex tear body and posterior horn lateral meniscus  4. Moderate cartilage loss lateral compartment  5. Severe edema in the subcutaneous tissues. Orthopedic surgery following; no surgical intervention at this time. Recommend to keep RLE in knee immobilizer while baring weight for support. Weight bearing as tolerate.  Will need right total knee replacement in the future, but recommend to provie time for structures to heal.     ESRD-PD with concern for SBP  - nephrology following;    Peritoneal fluid cx (3/3) diphtheroids; requested for final identification    Started on diflucan as empiric fungal therapy while treating for peritonitis per nephrology team    Currently on IV vancomycin    ID team has been consulted      Volume overloaded  - continue with UF with PD    Continue with lasix     Hypertension  - continue with hydralazine, coreg     Type II DM  - A1C 6.4    Continue with insulin therapy    Lantus 15 units daily     Depression  - continue with cympbalta     COPD  - not in exacerbation    On Dulera at home; continue with arformeterol/budesonide neb tmt here    Continue with Singulair    Hypokalemia; resolved    Hypocalcemia  - corrected calcium 8.8    Anemia, likely of chronic disease  - hgb 7.4; no s/sx of bleeding    Transfuse if hemoglobin less than 7     Code status: Full  Prophylaxis: Heparin  Care Plan discussed with: Patient, nurse  Anticipated Disposition: 48 hours  Inpatient  Cardiac monitoring: Telemetry    PT recommended gait training with a rollator if pt plans to resume ambulation w/ this at home vs obtaining a RW. Pt owns rollator walker, may need RW for increased stability w/ the immobilizer. Pt can be discharged once we receive final recommendation to treat peritonitis.      Hospital Problems  Date Reviewed: 11/28/2022            Codes Class Noted POA    Acute hypokalemia ICD-10-CM: E87.6  ICD-9-CM: 276.8  3/3/2023 Unknown        Right leg pain ICD-10-CM: M79.604  ICD-9-CM: 729.5  3/3/2023 Unknown        Peritoneal dialysis catheter in place [facility]) ICD-10-CM: Z99.2  ICD-9-CM: V45.11  3/3/2023 Unknown        Right leg swelling ICD-10-CM: M79.89  ICD-9-CM: 729.81  3/3/2023 Unknown         Social Determinants of Health     Tobacco Use: Medium Risk    Smoking Tobacco Use: Former    Smokeless Tobacco Use: Never    Passive Exposure: Not on file   Alcohol Use: Not on file   Financial Resource Strain: Not on file   Food Insecurity: Not on file   Transportation Needs: Not on file   Physical Activity: Not on file   Stress: Not on file   Social Connections: Not on file   Intimate Partner Violence: Not on file   Depression: Not on file   Housing Stability: Not on file       Review of Systems:   A comprehensive review of systems was negative except for that written in the HPI. Vital Signs:    Last 24hrs VS reviewed since prior progress note. Most recent are:  Visit Vitals  BP (!) 155/69 (BP 1 Location: Left upper arm, BP Patient Position: At rest;Supine)   Pulse 93   Temp 98.1 °F (36.7 °C)   Resp 20   Wt 104.2 kg (229 lb 11.5 oz)   SpO2 98%   BMI 38.23 kg/m²         Intake/Output Summary (Last 24 hours) at 3/7/2023 8787  Last data filed at 3/7/2023 0730  Gross per 24 hour   Intake --   Output 858 ml   Net -858 ml          Physical Examination:     I had a face to face encounter with this patient and independently examined them on 3/7/2023 as outlined below:          General : alert x 3, awake, no acute distress,   HEENT: PEERL, EOMI, moist mucus membrane  Neck: supple, no JVD, no meningeal signs  Chest: Clear to auscultation bilaterally   CVS: S1 S2 heard, Capillary refill less than 2 seconds  Abd: soft/ non tender, non distended, BS physiological,   Ext: Right lower extremity at the knee and below is edematous, erythematous, and warm. Raised. Left lower extremity normal range of motion. Nonpitting edema right lower extremity. Left lower extremity no edema  Neuro/Psych: pleasant mood and affect, CN 2-12 grossly intact, sensory grossly within normal limit  Skin: warm     Data Review:    Review and/or order of clinical lab test  Review and/or order of tests in the radiology section of CPT  Review and/or order of tests in the medicine section of CPT      I have personally and independently reviewed all pertinent labs, diagnostic studies, imaging, and have provided independent interpretation of the same.      Labs:     Recent Labs     03/07/23 0525 03/06/23  0310   WBC 7.0 7.4   HGB 7.4* 7.3*   HCT 25.1* 24.0*   * 375       Recent Labs     03/07/23 0525 03/06/23  0310 03/05/23  0307   * 136 140   K 4.1 3.3* 3.1*   CL 95* 94* 101   CO2 32 34* 31   BUN 22* 22* 23*   CREA 4.63* 4.76* 4.36*   * 168* 138*   CA 7.2* 7.5* 6.2*   MG 2.1  --  1.9   PHOS 4.2 4.4 4.1       Recent Labs     03/07/23  0525 03/05/23  0307   ALB 1.5* 1.3*       No results for input(s): INR, PTP, APTT, INREXT, INREXT in the last 72 hours. No results for input(s): FE, TIBC, PSAT, FERR in the last 72 hours. Lab Results   Component Value Date/Time    Folate 5.2 10/19/2022 06:33 PM        No results for input(s): PH, PCO2, PO2 in the last 72 hours. No results for input(s): CPK, CKNDX, TROIQ in the last 72 hours.     No lab exists for component: CPKMB  Lab Results   Component Value Date/Time    Cholesterol, total 260 (H) 07/02/2018 04:27 AM    HDL Cholesterol 31 07/02/2018 04:27 AM    LDL, calculated 197.2 (H) 07/02/2018 04:27 AM    Triglyceride 159 (H) 07/02/2018 04:27 AM    CHOL/HDL Ratio 8.4 (H) 07/02/2018 04:27 AM     Lab Results   Component Value Date/Time    Glucose (POC) 212 (H) 03/07/2023 06:29 AM    Glucose (POC) 278 (H) 03/06/2023 10:30 PM    Glucose (POC) 123 (H) 03/06/2023 04:46 PM    Glucose (POC) 129 (H) 03/06/2023 12:01 PM    Glucose (POC) 125 (H) 03/06/2023 07:02 AM     Lab Results   Component Value Date/Time    Color YELLOW/STRAW 12/02/2021 11:00 PM    Appearance CLEAR 12/02/2021 11:00 PM    Specific gravity 1.025 12/02/2021 11:00 PM    Specific gravity 1.010 06/08/2011 12:00 AM    pH (UA) 5.5 12/02/2021 11:00 PM    Protein 300 (A) 12/02/2021 11:00 PM    Glucose 500 (A) 12/02/2021 11:00 PM    Ketone Negative 12/02/2021 11:00 PM    Bilirubin Negative 12/02/2021 11:00 PM    Urobilinogen 1.0 12/02/2021 11:00 PM    Nitrites Negative 12/02/2021 11:00 PM    Leukocyte Esterase Negative 12/02/2021 11:00 PM    Epithelial cells FEW 12/02/2021 11:00 PM    Bacteria Negative 12/02/2021 11:00 PM    WBC 0-4 12/02/2021 11:00 PM    RBC 0-5 12/02/2021 11:00 PM       Notes reviewed from all clinical/nonclinical/nursing services involved in patient's clinical care. Care coordination discussions were held with appropriate clinical/nonclinical/ nursing providers based on care coordination needs. Patients current active Medications were reviewed, considered, added and adjusted based on the clinical condition today. Home Medications were reconciled to the best of my ability given all available resources at the time of admission. Route is PO if not otherwise noted.       Admission Status:[id number]:::1}      Medications Reviewed:     Current Facility-Administered Medications   Medication Dose Route Frequency    piperacillin-tazobactam (ZOSYN) 3.375 g in 0.9% sodium chloride (MBP/ADV) 100 mL MBP  3.375 g IntraVENous Q12H    polyethylene glycol (MIRALAX) packet 17 g  17 g Oral BID    potassium bicarb-citric acid (EFFER-K) tablet 40 mEq  40 mEq Oral BID    peritoneal dialysis DEXTROSE 2.5% (2.5 mEq/L low calcium) solution 2,000 mL  2,000 mL IntraPERitoneal DAILY    gentamicin (GARAMYCIN) 0.1 % cream   Topical BID    senna-docusate (PERICOLACE) 8.6-50 mg per tablet 1 Tablet  1 Tablet Oral DAILY    peritoneal dialysis DEXTROSE 4.25% (2.5 mEq/L low calcium) 6,000 mL with heparin (porcine) 3,000 Units   IntraPERitoneal DAILY    vancomycin - pharmacy to dose    Other Rx Dosing/Monitoring    peritoneal dialysis DEXTROSE 2.5% (2.5 mEq/L low calcium) 6,000 mL with heparin (porcine) 3,000 Units   IntraPERitoneal DAILY    peritoneal dialysis DEXTROSE 2.5% (2.5 mEq/L low calcium) 6,000 mL with heparin (porcine) 3,000 Units   IntraPERitoneal DAILY    epoetin joni-epbx (RETACRIT) injection 20,000 Units  20,000 Units SubCUTAneous Q TUE, THU & SAT    magnesium oxide (MAG-OX) tablet 400 mg  400 mg Oral DAILY    morphine injection 2 mg  2 mg IntraVENous Q3H PRN    oxyCODONE IR (ROXICODONE) tablet 5 mg  5 mg Oral Q6H PRN    albuterol-ipratropium (DUO-NEB) 2.5 MG-0.5 MG/3 ML  3 mL Nebulization Q6H PRN    amLODIPine (NORVASC) tablet 10 mg  10 mg Oral DAILY    aspirin tablet 325 mg  325 mg Oral DAILY    carvediloL (COREG) tablet 25 mg  25 mg Oral BID WITH MEALS    diclofenac (VOLTAREN) 1 % topical gel 2 g  2 g Topical DAILY    DULoxetine (CYMBALTA) capsule 20 mg  20 mg Oral BID    hydrALAZINE (APRESOLINE) tablet 50 mg  50 mg Oral TID    arformoterol 15 mcg/budesonide 0.5 mg neb solution   Nebulization BID RT    montelukast (SINGULAIR) tablet 10 mg  10 mg Oral QHS    rosuvastatin (CRESTOR) tablet 10 mg  10 mg Oral DAILY    ondansetron (ZOFRAN ODT) tablet 4 mg  4 mg Oral Q8H PRN    acetaminophen (TYLENOL) tablet 650 mg  650 mg Oral Q6H PRN    glucose chewable tablet 16 g  4 Tablet Oral PRN    glucagon (GLUCAGEN) injection 1 mg  1 mg IntraMUSCular PRN    dextrose 10 % infusion 0-250 mL  0-250 mL IntraVENous PRN    heparin (porcine) injection 5,000 Units  5,000 Units SubCUTAneous Q12H    furosemide (LASIX) tablet 80 mg  80 mg Oral BID    insulin lispro (HUMALOG) injection   SubCUTAneous AC&HS    insulin glargine (LANTUS) injection 15 Units  15 Units SubCUTAneous DAILY    gentamicin (GARAMYCIN) 0.1 % cream   Topical DIALYSIS PRN     ______________________________________________________________________  EXPECTED LENGTH OF STAY: 4d 14h  ACTUAL LENGTH OF STAY:          4                 Jane Rider, NP

## 2023-03-07 NOTE — DIABETES MGMT
[facility]  DIABETES MANAGEMENT CONSULT    Consulted by Provider for advanced nursing evaluation and care for inpatient blood glucose management. Evaluation and Action Plan   Alfonso Libman is a 66yo AA female living with T2D/ ESRD-PD dependant -  PTA endorses taking once weekly Trulicity/ and basal insulin . Voices her meter broke so not been checking Bg. Endorses \"she doesn't eat much\". Admitted s/p fall PTA with subsequent right LE injury of hematoma-ortho consulted. Current A1C 6.4%    BG trends past 24h:  125-278. Noted consistent  hyperglycemia late in day with in target fasting BG. Noted receiving PD while hospitalized in evenings/overnight which could be contributing to late evening hyperglycemia (dextrose is dialysis fluid ) . Noted once daily basal insulin ordered at PTA dose. Will continue with current Lantus dose for diabetes coverage- May consider one time NPH insulin to cover for dextrose in dialysis fluid if hyperglycemia is more pronounced. Management Rationale Action Plan   Medication   Basal needs Using 20% lower PTA basal dose  SWITCHED to NPH 7 units BID with PM dose timed with nightly dialysis treatment     Nutritional needs NA    Corrective insulin Using HIGH sensitivity based on ESRD CONTINUE    Additional orders  Will conservatively cover for dialysate (4.25% dextrose) with one time NPH to be give with time PD started. Peritoneal Dialysis Insulin coverage : 1 UNIT NPH INSULIN / 10 GRAMS DEXTROSE    1.5% dialysate= 15gm/L= 30gms dextrose /2L bag  2.5% dialysate= 25gm/L= 50 gms dextrose /2L bAG  4.5% dialysate= 45gm/L = 90 grams dextrose/2L bag                       Diabetes Discharge Plan   Medication     Referral  []        Outpatient diabetes education   Additional orders            Initial Presentation   Alfonso Libman is a 75 y.o. female admitted  3/3/23  after experiencing fall on 3/1-hit head without LOC and was able to get self up.   Pt did not seek for medical after immediately after the fall. Pt decided to seek for medical help when her RLE pain continue to get worse. LAB:  CXR: Pulmonary vascular congestion/ xray of right knee: without fracture  CT head: Head CT negative for acute intracranial process/ CT right LE: Diffuse soft tissue swelling of the right lower extremity. Suspected large hematoma anterior to the patella and patellar tendon. Large predominantly fat density lesion in the right iliopsoas compartment  which is increased in size since 6/8/2011. This has no definite concerning  features on noncontrast CT and may simply reflect a lipoma. Duplex study: low extremity revealed no DVT    HX:   Past Medical History:   Diagnosis Date    Asthma     CAD (coronary artery disease)     CHF (congestive heart failure) ([facility])     Dr ?    Chronic kidney disease     HD M-W-F Radu Menon [phone]    COPD (chronic obstructive pulmonary disease) ([facility] [place] [address]     PCP manages    Diabetes ([facility] [place] [address]     DM2    GERD (gastroesophageal reflux disease)     Hypertension         INITIAL DX:   Right leg swelling [M79.89]  Right leg pain [M79.604]  Peritoneal dialysis catheter in place [facility]) [Z99.2]  Acute hypokalemia [E87.6]     Current Treatment     TX: empiric abx/ PD dialysis per nephrology    Hospital Course   Clinical progress has been complicated by Cellulitis vs. Compartment syndrome s/p fall TPA right LE.   3/3: ortho consulted/ blood cx pending/   Diabetes History   T2D- A1c 6.4%-improved since June 2022, A1C was 8.6%, Current A1C 6.4%    Diabetes-related Medical History  Neurological complications  ? Microvascular disease  [id number]-KG dialysis  Macrovascular disease  CAD  Other associated conditions     HF/ HTN/ COPD    Diabetes Medication History  Key Antihyperglycemic Medications               dulaglutide (Trulicity) 6.75 UP/6.9 mL sub-q pen (Taking) 0.75 mg by SubCUTAneous route every Monday.     insulin glargine (LANTUS,BASAGLAR) 100 unit/mL (3 mL) inpn (Taking) 20 Units by SubCUTAneous route daily (with lunch). Diabetes self-management practices:   Eating pattern-\" I dont have much of an appetite these days\" -Verbalized she consumed 2 cups of cauliflower yesterday. Appetite waxes and wanes  [x] Dentition status dentures  Physical activity pattern-none/    Monitoring pattern-\"my meter broke\"      Taking medications pattern  [x] Consistent administration  [x] Affordable  Reducing risks  [] Influenza: There is no immunization history on file for this patient. [] Pneumonia:   There is no immunization history on file for this patient. [] Hepatitis:   There is no immunization history on file for this patient. Social determinants of health impacting diabetes self-management practices   None voiced/observed  Overall evaluation:    [x] Achieving A1c target with drug therapy & self-care practices-A1C actually BELOW target for her duration of diabetes/ long term complications, ( ADA target for her, <7.5%)    Subjective   I'm getting antibiotics for my knee     Objective   Physical exam  General Obese AA  female in no acute distress. Conversant and cooperative  Neuro  Alert, oriented   Vital Signs Visit Vitals  /63 (BP 1 Location: Right upper arm, BP Patient Position: At rest)   Pulse 95   Temp 98.3 °F (36.8 °C)   Resp 18   Wt 104.2 kg (229 lb 11.5 oz)   SpO2 99%   BMI 38.23 kg/m²     Skin  Warm and dry. Heart   Regular rate and rhythm.  No murmurs, rubs or gallops  Lungs  Clear to auscultation without rales or rhonchi  Extremities No foot wounds        Laboratory  Recent Labs     03/07/23  0525 03/06/23  0310 03/05/23  0307   * 168* 138*   AGAP 7 8 8   WBC 7.0 7.4 7.6   CREA 4.63* 4.76* 4.36*         Factors impacting BG management  Factor Dose Comments   Nutrition:  Standard meals-?NPO     60 grams/meal      Pain prn    Infection NA    Other:   Kidney function  Liver function     ESRD-PD dependant        Blood glucose pattern    Significant diabetes-related events over the past 24-72 hours  T2D, a1c 6.4%  Fasting BG in target / elevated BG in late evening noted 2/2 dialysis fluid (has dextrose in it)      Assessment and Nursing Intervention   Nursing Diagnosis Risk for unstable blood glucose pattern   Nursing Intervention Domain 5250 Decision-making Support   Nursing Interventions Examined current inpatient diabetes/blood glucose control   Explored factors facilitating and impeding inpatient management  Explored corrective strategies with patient and responsible inpatient provider   Informed patient of rational for insulin strategy while hospitalized     Nursing Diagnosis 47594 Ineffective Health Management   Nursing Intervention Domain 5250 Decision-making Support   Nursing Interventions Identified diabetes self-management practices impeding diabetes control  Discussed diabetes survival skills related to  Healthy Plate eating plan; given handouts  Role of physical activity in improving insulin sensitivity and action  Procedure for blood glucose monitoring & options for low-cost products  Medications plan at discharge     Billing Code(s)   [phone]    Before making these care recommendations, I personally reviewed the hospitalization record, including notes, laboratory & diagnostic data and current medications, and examined the patient at the bedside (circumstances permitting) before determining care. More than fifty (50) percent of the time was spent in patient counseling and/or care coordination.   Total minutes: 509 CHINO. Daniel Swift, CNS  Diabetes Clinical Nurse Specialist  [facility]  Access via [facility]

## 2023-03-07 NOTE — PROGRESS NOTES
Problem: Mobility Impaired (Adult and Pediatric)  Goal: *Acute Goals and Plan of Care (Insert Text)  Description: FUNCTIONAL STATUS PRIOR TO ADMISSION: Patient was modified independent using a rolling walker and single point cane for functional mobility. Patient reports falls at home. HOME SUPPORT PRIOR TO ADMISSION: The patient lived with son but did not require assist.    Physical Therapy Goals  Initiated 3/3/2023  1. Patient will move from supine to sit and sit to supine  in bed with minimal assistance/contact guard assist within 7 day(s). 2.  Patient will transfer from bed to chair and chair to bed with minimal assistance/contact guard assist using the least restrictive device within 7 day(s). 3.  Patient will perform sit to stand with minimal assistance/contact guard assist within 7 day(s). 4.  Patient will ambulate with minimal assistance/contact guard assist for 50 feet with the least restrictive device within 7 day(s). 5.  Patient will ascend/descend 3 stairs with bilateral handrail(s) with minimal assistance/contact guard assist within 7 day(s). Outcome: Progressing Towards Goal     PHYSICAL THERAPY TREATMENT  Patient: Leatha Javier (78 y.o. female)  Date: 3/7/2023  Diagnosis: Right leg swelling [M79.89]  Right leg pain [M79.604]  Peritoneal dialysis catheter in place [facility]) [Z99.2]  Acute hypokalemia [E87.6] <principal problem not specified>      Precautions: Fall, WBAT R LE KI  Chart, physical therapy assessment, plan of care and goals were reviewed. ASSESSMENT  Patient continues with skilled PT services and is progressing towards goals. Improved bed mobility and sit<>stand today. Assisted patient with obtaining and applying new knee immobilizer as previous KI soiled - required two different immobilizers to find appropriate size for patient. Patient with pain and shortness of breath during mobility but vitals stable throughout.  Improved gait pattern with overall step-through pattern with rolling walker. Do feel she would benefit from rolling walker rather than rollator at discharge given KI and gait pattern. Had plenty of support per patient to discharge home with [facility] and family assist. Acute PT will follow. Current Level of Function Impacting Discharge (mobility/balance): min A bed mobility    Other factors to consider for discharge: KI, need to progress to stairs         PLAN :  Patient continues to benefit from skilled intervention to address the above impairments. Continue treatment per established plan of care. to address goals. Recommendation for discharge: (in order for the patient to meet his/her long term goals)  Physical therapy at least 2 days/week in the home AND ensure assist and/or supervision for safety with functional mobility    This discharge recommendation:  Has been made in collaboration with the attending provider and/or case management    IF patient discharges home will need the following DME: rolling walker     SUBJECTIVE:   Patient stated I am short of breath.     OBJECTIVE DATA SUMMARY:   Critical Behavior:  Neurologic State: Alert, Appropriate for age  Orientation Level: Oriented X4  Cognition: Appropriate decision making, Appropriate for age attention/concentration, Appropriate safety awareness, Follows commands  Safety/Judgement: Awareness of environment  Functional Mobility Training:  Bed Mobility:     Supine to Sit: Minimum assistance (for R LE, progressing to CGA)  Sit to Supine: Minimum assistance (R LE)  Scooting: Supervision        Transfers:  Sit to Stand: Contact guard assistance;Minimum assistance; Adaptive equipment  Stand to Sit: Contact guard assistance; Adaptive equipment                             Balance:  Sitting: Intact  Standing: Impaired  Standing - Static: Good;Constant support  Standing - Dynamic : Good;Constant support  Ambulation/Gait Training:  Distance (ft): 60 Feet (ft)  Assistive Device: Gait belt;Walker, rolling  Ambulation - Level of Assistance: Contact guard assistance;Assist x1;Additional time; Adaptive equipment        Gait Abnormalities: Antalgic;Decreased step clearance (step-to progressing to step-through gait pattern)        Base of Support: Widened  Stance: Right decreased  Speed/Maryann: Slow     Swing Pattern: Right asymmetrical    Pain Rating:  Pain throughout session R knee with movement    Activity Tolerance:   Fair    After treatment patient left in no apparent distress:   Sitting in chair, Call bell within reach, Bed / chair alarm activated, and LEs elevated    COMMUNICATION/COLLABORATION:   The patients plan of care was discussed with: Occupational therapist, Registered nurse, and Rehabilitation technician.      Luis Zuleta, PT   Time Calculation: 29 mins

## 2023-03-07 NOTE — DIALYSIS
Peritoneal Dialysis Initiation / [phone]     Orders   Therapy Time: 9.5    Cycles: 5   Fill Volume: 2300   Last Fill Volume: 0   Total Volume: 1150   Solution: Dextrose Dianeal bags 4.25% x 1 - 2.5% x 2 with heparin additives      Access   Type & Location: Left mid abdomen   Comments: Prior to connection, one-minute Alcavis scrub performed. Followed by  one minute soak. Dsg change date:  03/07/23                      Labs   HBsAg (Antigen) / date:       03/03/26 negative                                        HBsAb (Antibody) / date: 03/03/23 susceptible    Source: epic     Safety:   Time Out Done:   (Time) 1700   Consent obtained/signed: Chronic consent applies   Education: Procedural    Primary Nurse Rpt Pre: Camryn Lemon RN   Primary Nurse Rpt Post: Lai Elliott RN      Comments:   Pt orders, notes, labs, code status reviewed. Patient with 2000 ml daily mid day manual exchange @ 1245 today. Start time: 8594  Estimated End 0300 03/08/23    Remained present during initial drain followed by initiation of first fill. Prior to departure, bed in lowest position, call bell and personal belongings within reach.

## 2023-03-07 NOTE — DIALYSIS
Peritoneal Dialysis Initiation / [phone]     Orders   Therapy Time: 9.5 PLUS 2 HOURS AM CELL COUNT   Cycles: 5   Fill Volume: 2300   Last Fill Volume: 1000 FOR AM CELL COUNT   Total Volume: 45238   Solution: Dextrose Dianeal bags 4.25% x 1 - 2.5% x 2      Access   Type & Location: Left mid abdomen   Comments: Prior to connection, one-minute Alcavis scrub performed. Followed by  one minute soak. Dsg change date:   03/06/23 crusty around exit site, cleansed, no active drainage, gentamicin cream applied                                 Labs   HBsAg (Antigen) / date:       03/03/26 negative                                        HBsAb (Antibody) / date: 03/03/23 susceptible    Source: epic     Safety:   Time Out Done:   (Time) 1830   Consent obtained/signed: Chronic consent applies   Education: Procedural    Primary Nurse Rpt Pre: Alexey Gtz RN   Primary Nurse Rpt Post: Saul Bui RN      Comments:   Pt orders, notes, labs, code status reviewed. TRANSFER SET CHANGED PER POLICY AND PROCEDURE with elevated cell count     Start time: 1845  Estimated End Time:0415 PLUS TWO HOUR DWELL FOR CELL COUNT    Remained present during initial drain followed by initiation of first fill. Prior to departure, bed in lowest position, call bell and personal belongings within reach.

## 2023-03-07 NOTE — DIALYSIS
MANUAL MID DAY EXCHANGE  Before fill patient was drained with no measure output. Patient has \"dry\" days with cycler treatment while in hospital  VOLUME:  2000 ml  Duration:  4 hours    Start time:  2170 PM  End time:  4723 PM    One minute alcavis scrub followed by one minute soak prior to connection and repeated prior to disconnection with minicap applied to tip. Dressing change to lower quadrant PD catheter per policy and dated.

## 2023-03-08 VITALS
TEMPERATURE: 98.4 F | SYSTOLIC BLOOD PRESSURE: 150 MMHG | DIASTOLIC BLOOD PRESSURE: 66 MMHG | OXYGEN SATURATION: 98 % | HEART RATE: 99 BPM | BODY MASS INDEX: 39.07 KG/M2 | RESPIRATION RATE: 16 BRPM | WEIGHT: 234.79 LBS

## 2023-03-08 LAB
ALBUMIN SERPL-MCNC: 1.5 G/DL (ref 3.5–5)
ANION GAP SERPL CALC-SCNC: 4 MMOL/L (ref 5–15)
BACTERIA SPEC CULT: ABNORMAL
BACTERIA SPEC CULT: ABNORMAL
BACTERIA SPEC CULT: NORMAL
BUN SERPL-MCNC: 20 MG/DL (ref 6–20)
BUN/CREAT SERPL: 4 (ref 12–20)
CALCIUM SERPL-MCNC: 7.4 MG/DL (ref 8.5–10.1)
CHLORIDE SERPL-SCNC: 96 MMOL/L (ref 97–108)
CO2 SERPL-SCNC: 34 MMOL/L (ref 21–32)
CREAT SERPL-MCNC: 4.6 MG/DL (ref 0.55–1.02)
GLUCOSE BLD STRIP.AUTO-MCNC: 133 MG/DL (ref 65–117)
GLUCOSE BLD STRIP.AUTO-MCNC: 142 MG/DL (ref 65–117)
GLUCOSE BLD STRIP.AUTO-MCNC: 173 MG/DL (ref 65–117)
GLUCOSE SERPL-MCNC: 169 MG/DL (ref 65–100)
GRAM STN SPEC: ABNORMAL
GRAM STN SPEC: ABNORMAL
MAGNESIUM SERPL-MCNC: 2.2 MG/DL (ref 1.6–2.4)
PHOSPHATE SERPL-MCNC: 4.5 MG/DL (ref 2.6–4.7)
POTASSIUM SERPL-SCNC: 3.8 MMOL/L (ref 3.5–5.1)
SERVICE CMNT-IMP: ABNORMAL
SERVICE CMNT-IMP: NORMAL
SODIUM SERPL-SCNC: 134 MMOL/L (ref 136–145)
VANCOMYCIN SERPL-MCNC: 10.1 UG/ML

## 2023-03-08 PROCEDURE — 82962 GLUCOSE BLOOD TEST: CPT

## 2023-03-08 PROCEDURE — 97535 SELF CARE MNGMENT TRAINING: CPT

## 2023-03-08 PROCEDURE — 97116 GAIT TRAINING THERAPY: CPT

## 2023-03-08 PROCEDURE — 80069 RENAL FUNCTION PANEL: CPT

## 2023-03-08 PROCEDURE — 97530 THERAPEUTIC ACTIVITIES: CPT

## 2023-03-08 PROCEDURE — 74011250637 HC RX REV CODE- 250/637: Performed by: NURSE PRACTITIONER

## 2023-03-08 PROCEDURE — 80202 ASSAY OF VANCOMYCIN: CPT

## 2023-03-08 PROCEDURE — 74011250636 HC RX REV CODE- 250/636: Performed by: NURSE PRACTITIONER

## 2023-03-08 PROCEDURE — 74011250637 HC RX REV CODE- 250/637: Performed by: INTERNAL MEDICINE

## 2023-03-08 PROCEDURE — 74011000250 HC RX REV CODE- 250: Performed by: NURSE PRACTITIONER

## 2023-03-08 PROCEDURE — 83735 ASSAY OF MAGNESIUM: CPT

## 2023-03-08 PROCEDURE — 90945 DIALYSIS ONE EVALUATION: CPT

## 2023-03-08 PROCEDURE — A4722 DIALYS SOL FLD VOL > 1999CC: HCPCS | Performed by: NURSE PRACTITIONER

## 2023-03-08 PROCEDURE — 36415 COLL VENOUS BLD VENIPUNCTURE: CPT

## 2023-03-08 PROCEDURE — 74011636637 HC RX REV CODE- 636/637: Performed by: CLINICAL NURSE SPECIALIST

## 2023-03-08 PROCEDURE — 94640 AIRWAY INHALATION TREATMENT: CPT

## 2023-03-08 RX ORDER — FLUCONAZOLE 150 MG/1
150 TABLET ORAL EVERY 24 HOURS
Qty: 14 TABLET | Refills: 0 | Status: SHIPPED | OUTPATIENT
Start: 2023-03-08 | End: 2023-03-22

## 2023-03-08 RX ORDER — LANOLIN ALCOHOL/MO/W.PET/CERES
400 CREAM (GRAM) TOPICAL DAILY
Qty: 30 TABLET | Refills: 0 | Status: SHIPPED | OUTPATIENT
Start: 2023-03-09 | End: 2023-04-08

## 2023-03-08 RX ORDER — OXYCODONE HYDROCHLORIDE 5 MG/1
5 TABLET ORAL
Qty: 20 TABLET | Refills: 0 | Status: SHIPPED | OUTPATIENT
Start: 2023-03-08 | End: 2023-03-13

## 2023-03-08 RX ORDER — FLUCONAZOLE 100 MG/1
150 TABLET ORAL EVERY 24 HOURS
Status: DISCONTINUED | OUTPATIENT
Start: 2023-03-08 | End: 2023-03-08 | Stop reason: HOSPADM

## 2023-03-08 RX ORDER — AMOXICILLIN 250 MG
1 CAPSULE ORAL DAILY
Qty: 30 TABLET | Refills: 0 | Status: SHIPPED | OUTPATIENT
Start: 2023-03-09 | End: 2023-04-08

## 2023-03-08 RX ADMIN — DICLOFENAC 2 G: 10 GEL TOPICAL at 10:10

## 2023-03-08 RX ADMIN — CARVEDILOL 25 MG: 12.5 TABLET, FILM COATED ORAL at 07:33

## 2023-03-08 RX ADMIN — HEPARIN SODIUM 5000 UNITS: 5000 INJECTION INTRAVENOUS; SUBCUTANEOUS at 12:17

## 2023-03-08 RX ADMIN — ASPIRIN 325 MG ORAL TABLET 325 MG: 325 PILL ORAL at 10:07

## 2023-03-08 RX ADMIN — OXYCODONE HYDROCHLORIDE 5 MG: 5 TABLET ORAL at 16:13

## 2023-03-08 RX ADMIN — POTASSIUM BICARBONATE 40 MEQ: 782 TABLET, EFFERVESCENT ORAL at 17:38

## 2023-03-08 RX ADMIN — Medication 7 UNITS: at 10:06

## 2023-03-08 RX ADMIN — HYDRALAZINE HYDROCHLORIDE 50 MG: 50 TABLET, FILM COATED ORAL at 10:06

## 2023-03-08 RX ADMIN — GENTAMICIN SULFATE: 1 CREAM TOPICAL at 12:52

## 2023-03-08 RX ADMIN — DULOXETINE HYDROCHLORIDE 20 MG: 20 CAPSULE, DELAYED RELEASE ORAL at 17:34

## 2023-03-08 RX ADMIN — ARFORMOTEROL TARTRATE: 15 SOLUTION RESPIRATORY (INHALATION) at 07:36

## 2023-03-08 RX ADMIN — DULOXETINE HYDROCHLORIDE 20 MG: 20 CAPSULE, DELAYED RELEASE ORAL at 10:07

## 2023-03-08 RX ADMIN — VANCOMYCIN HYDROCHLORIDE: 1 INJECTION, POWDER, LYOPHILIZED, FOR SOLUTION INTRAVENOUS at 12:51

## 2023-03-08 RX ADMIN — POTASSIUM BICARBONATE 40 MEQ: 782 TABLET, EFFERVESCENT ORAL at 10:07

## 2023-03-08 RX ADMIN — FUROSEMIDE 80 MG: 40 TABLET ORAL at 10:06

## 2023-03-08 RX ADMIN — HYDRALAZINE HYDROCHLORIDE 50 MG: 50 TABLET, FILM COATED ORAL at 17:36

## 2023-03-08 RX ADMIN — ROSUVASTATIN 10 MG: 10 TABLET, FILM COATED ORAL at 10:07

## 2023-03-08 RX ADMIN — FLUCONAZOLE 150 MG: 100 TABLET ORAL at 17:34

## 2023-03-08 RX ADMIN — AMLODIPINE BESYLATE 10 MG: 5 TABLET ORAL at 10:06

## 2023-03-08 RX ADMIN — FUROSEMIDE 80 MG: 40 TABLET ORAL at 17:35

## 2023-03-08 RX ADMIN — OXYCODONE HYDROCHLORIDE 5 MG: 5 TABLET ORAL at 07:34

## 2023-03-08 RX ADMIN — MAGNESIUM OXIDE 400 MG (241.3 MG MAGNESIUM) TABLET 400 MG: TABLET at 10:06

## 2023-03-08 NOTE — PROGRESS NOTES
Problem: Pressure Injury - Risk of  Goal: *Prevention of pressure injury  Description: Document Waldo Scale and appropriate interventions in the flowsheet. Outcome: Progressing Towards Goal  Note: Pressure Injury Interventions:       Moisture Interventions: Absorbent underpads    Activity Interventions: Increase time out of bed    Mobility Interventions: PT/OT evaluation, HOB 30 degrees or less    Nutrition Interventions: Offer support with meals,snacks and hydration    Problem: Pain  Goal: *Control of Pain  Outcome: Progressing Towards Goal     Problem: Falls - Risk of  Goal: *Absence of Falls  Description: Document Jairo Fall Risk and appropriate interventions in the flowsheet.   Outcome: Progressing Towards Goal  Note: Fall Risk Interventions:

## 2023-03-08 NOTE — PROGRESS NOTES
[facility]   [address] [address], [address] [facility]  Phone: [phone]   [phone]    [email or web address]     Nephrology Progress Note    Patient Name : Denis Borja      : 1955     MRN : [id number]  Date of Admission : 3/3/2023  Date of Servive : 23    CC: Follow up for ESRD       Assessment and Plan   ESRD-PD :  - CCPD, [place] [facility], Dr. Elkins Rear  - cont CCPD , with add day dwell @ 12 noon -4 hours  -  daily PD fluid cell count   -  3/3 PD gram (-) culture (diptheroids)  - Plan to switch to IP anbx with total 14 days of treatment (first vanc 3/3/23)  - updated outpatient PD clinic regarding Abx  - Continue Diflucan at discharge while on ABx  - Increased Miralax BID continue with senna Docusate  - Gentamicin cream on PD site  - stable from renal standpoint for discharge    Volume overloaded  -Intensified UF with PD (using 4.25% on top of 2.5% PD fluid)  -Cont Lasix 80 IV BID    Hypokalemia: Chronic  - Increase KCL supplements BID    Hypomagnesemia  -replete accordingly    Anemia in CKD  -Hb 7.4 G/dl improving  -Tsat 16,ferritin 906 -- plan to restart outpatient Venofer   -MADY 20K     MBD of CKD  - corca stable  - Phos stable    S/p GLF  RLE cellulitis  -Consulted Ortho    DM2  -MX per primary team    HTN  -Continue home meds    Hypoalbuminemia  - would benefit from protein supplement (nephro or liqucel)     CAD  HFpEF  GERD       Interval History:  Patient seen and examined this AM. She had ~1700 cc removed with PD yesterday. Edema persists will continue with current regimen. Reached out to outpatient PD nurse regarding need for IP anbx upon discharge. She denies constipation. Diflucan already ordered. No new issues or complaints. She states she has help at home if discharged. Review of Systems: A comprehensive review of systems was negative except for that written in the HPI.     Current Medications:   Current Facility-Administered Medications Medication Dose Route Frequency    peritoneal dialysis DEXTROSE 2.5% (2.5 mEq/L low calcium) 2,000 mL with vancomycin 1,500 mg   IntraPERitoneal ONCE    fluconazole (DIFLUCAN) tablet 150 mg  150 mg Oral Q24H    insulin NPH (NOVOLIN N, HUMULIN N) injection 7 Units  7 Units SubCUTAneous Q12H    polyethylene glycol (MIRALAX) packet 17 g  17 g Oral BID    potassium bicarb-citric acid (EFFER-K) tablet 40 mEq  40 mEq Oral BID    [Held by provider] peritoneal dialysis DEXTROSE 2.5% (2.5 mEq/L low calcium) solution 2,000 mL  2,000 mL IntraPERitoneal DAILY    senna-docusate (PERICOLACE) 8.6-50 mg per tablet 1 Tablet  1 Tablet Oral DAILY    peritoneal dialysis DEXTROSE 4.25% (2.5 mEq/L low calcium) 6,000 mL with heparin (porcine) 3,000 Units   IntraPERitoneal DAILY    peritoneal dialysis DEXTROSE 2.5% (2.5 mEq/L low calcium) 6,000 mL with heparin (porcine) 3,000 Units   IntraPERitoneal DAILY    peritoneal dialysis DEXTROSE 2.5% (2.5 mEq/L low calcium) 6,000 mL with heparin (porcine) 3,000 Units   IntraPERitoneal DAILY    epoetin joni-epbx (RETACRIT) injection 20,000 Units  20,000 Units SubCUTAneous Q TUE, THU & SAT    magnesium oxide (MAG-OX) tablet 400 mg  400 mg Oral DAILY    morphine injection 2 mg  2 mg IntraVENous Q3H PRN    oxyCODONE IR (ROXICODONE) tablet 5 mg  5 mg Oral Q6H PRN    albuterol-ipratropium (DUO-NEB) 2.5 MG-0.5 MG/3 ML  3 mL Nebulization Q6H PRN    amLODIPine (NORVASC) tablet 10 mg  10 mg Oral DAILY    aspirin tablet 325 mg  325 mg Oral DAILY    carvediloL (COREG) tablet 25 mg  25 mg Oral BID WITH MEALS    diclofenac (VOLTAREN) 1 % topical gel 2 g  2 g Topical DAILY    DULoxetine (CYMBALTA) capsule 20 mg  20 mg Oral BID    hydrALAZINE (APRESOLINE) tablet 50 mg  50 mg Oral TID    arformoterol 15 mcg/budesonide 0.5 mg neb solution   Nebulization BID RT    montelukast (SINGULAIR) tablet 10 mg  10 mg Oral QHS    rosuvastatin (CRESTOR) tablet 10 mg  10 mg Oral DAILY    ondansetron (ZOFRAN ODT) tablet 4 mg  4 mg Oral Q8H PRN    acetaminophen (TYLENOL) tablet 650 mg  650 mg Oral Q6H PRN    glucose chewable tablet 16 g  4 Tablet Oral PRN    glucagon (GLUCAGEN) injection 1 mg  1 mg IntraMUSCular PRN    dextrose 10 % infusion 0-250 mL  0-250 mL IntraVENous PRN    heparin (porcine) injection 5,000 Units  5,000 Units SubCUTAneous Q12H    furosemide (LASIX) tablet 80 mg  80 mg Oral BID    insulin lispro (HUMALOG) injection   SubCUTAneous AC&HS    gentamicin (GARAMYCIN) 0.1 % cream   Topical DIALYSIS PRN      Allergies   Allergen Reactions    Ivp Dye [Fd And C Blue No.1] Hives       Objective:  Vitals:    Vitals:    03/08/23 0200 03/08/23 0400 03/08/23 0600 03/08/23 0732   BP:  139/66  (!) 148/70   Pulse: 81 88 88 95   Resp:  14     Temp:  98.4 °F (36.9 °C)     SpO2:  98%     Weight:         Intake and Output:  No intake/output data recorded. 03/06 1901 - 03/08 0700  In: 240 [P.O.:240]  Out: 2026     Physical Examination:  General: Obese  Neck:  Supple, no mass  Resp:  Diminished at bases   CV:  RRR,  no murmur or rub, pitting R>L LE edema  GI:  PD catheter exit site clean  Neurologic:  Non focal  Psych:             AAO x 3 appropriate affect   Extremities: RLE tenderness around knee with bruising and swelling  :  Voids daily      []    High complexity decision making was performed  []    Patient is at high-risk of decompensation with multiple organ involvement    Lab Data Personally Reviewed: I have reviewed all the pertinent labs, microbiology data and radiology studies during assessment.     Recent Labs     03/08/23 0522 03/07/23 0525 03/06/23 0310   * 134* 136   K 3.8 4.1 3.3*   CL 96* 95* 94*   CO2 34* 32 34*   * 240* 168*   BUN 20 22* 22*   CREA 4.60* 4.63* 4.76*   CA 7.4* 7.2* 7.5*   MG 2.2 2.1  --    PHOS 4.5 4.2 4.4   ALB 1.5* 1.5*  --        Recent Labs     03/07/23 0525 03/06/23  0310 03/05/23  1435   WBC 7.0 7.4  --    HGB 7.4* 7.3* 7.3*   HCT 25.1* 24.0* 24.5*   * 375  --        No results found for: SDES  Lab Results   Component Value Date/Time    Culture result: FEW DIPHTHEROIDS (A) 03/03/2023 05:26 PM    Culture result: PER DR. OTTO/ FULL ID FOR LISTED DIPTHEROIDS 03/03/2023 05:26 PM    Culture result: NO GROWTH 5 DAYS 03/03/2023 05:32 AM    Culture result: MIXED UROGENITAL STANLEY ISOLATED 05/17/2022 09:52 AM    Culture result: NO GROWTH 5 DAYS 05/15/2022 03:51 AM     Recent Results (from the past 24 hour(s))   GLUCOSE, POC    Collection Time: 03/07/23 11:16 AM   Result Value Ref Range    Glucose (POC) 130 (H) 65 - 117 mg/dL    Performed by Kathie Suárez    GLUCOSE, POC    Collection Time: 03/07/23  4:29 PM   Result Value Ref Range    Glucose (POC) 169 (H) 65 - 117 mg/dL    Performed by Bita Flood    GLUCOSE, POC    Collection Time: 03/07/23  9:45 PM   Result Value Ref Range    Glucose (POC) 281 (H) 65 - 117 mg/dL    Performed by Kaitlin Rooney    RENAL FUNCTION PANEL    Collection Time: 03/08/23  5:22 AM   Result Value Ref Range    Sodium 134 (L) 136 - 145 mmol/L    Potassium 3.8 3.5 - 5.1 mmol/L    Chloride 96 (L) 97 - 108 mmol/L    CO2 34 (H) 21 - 32 mmol/L    Anion gap 4 (L) 5 - 15 mmol/L    Glucose 169 (H) 65 - 100 mg/dL    BUN 20 6 - 20 MG/DL    Creatinine 4.60 (H) 0.55 - 1.02 MG/DL    BUN/Creatinine ratio 4 (L) 12 - 20      eGFR 10 (L) >60 ml/min/1.73m2    Calcium 7.4 (L) 8.5 - 10.1 MG/DL    Phosphorus 4.5 2.6 - 4.7 MG/DL    Albumin 1.5 (L) 3.5 - 5.0 g/dL   MAGNESIUM    Collection Time: 03/08/23  5:22 AM   Result Value Ref Range    Magnesium 2.2 1.6 - 2.4 mg/dL   VANCOMYCIN, RANDOM    Collection Time: 03/08/23  5:22 AM   Result Value Ref Range    Vancomycin, random 10.1 UG/ML   GLUCOSE, POC    Collection Time: 03/08/23  7:26 AM   Result Value Ref Range    Glucose (POC) 133 (H) 65 - 117 mg/dL    Performed by Kaitlin Rooney I have reviewed the flowsheets. Chart and Pertinent Notes have been reviewed.    No change in PMH ,family and social history from Consult note.      Zheng Wilson NP  [facility]

## 2023-03-08 NOTE — PROGRESS NOTES
3/8/2023; 1730 -   Evening CM received call from Attending re: patient's walker. CM reviewed [place] and notes that the order was closed due to the patient having received a walker through her ins within the past 5 years. The ins will not cover patient's walker at this time. CM discussed additional options to include:  -purchasing a walker through a pharmacy or [employer] a walker from a friend or Jehov[employer]'s witness community    Attending expressed understanding of the above.   CRM: Ciara Rivero, MPH, Baltimoresonya Coulter  18.: [phone] or [phone]

## 2023-03-08 NOTE — DISCHARGE INSTRUCTIONS
Discharge Instructions       PATIENT ID: Roque Abdi  MRN: [id number]   YOB: 1955    DATE OF ADMISSION: 3/3/2023   DATE OF DISCHARGE: 3/8/2023    PRIMARY CARE PROVIDER: Breann Ramos     ATTENDING PHYSICIAN: Darin Jo MD   DISCHARGING PROVIDER: Silvino Kennedy MD    To contact this individual call [phone] and ask the  to page. If unavailable ask to be transferred the Adult Hospitalist Department. DISCHARGE DIAGNOSES     Right knee/lower extremity pain due to torn ACL and meniscus  You had an MRI which showed the following findings: )  1. Torn ACL  2. Complex vertical tear peripherally body and posterior horn medial meniscus. Marked volume loss within the body  3. Complex tear body and posterior horn lateral meniscus  4. Moderate cartilage loss lateral compartment  5. Severe edema in the subcutaneous tissues. You were evaluated by orthopedic surgery recommend to keep RLE in knee immobilizer while baring weight for support so you can continue weight bearing as tolerate. The orthopedic team thinks you will need right total knee replacement in the future, but recommend to provie time for structures to heal.    Do not drive or operate machinery while taking opioid pain medications. Your insurance declined coverage for rolling walker stating that you have received one within the past 5 years. You may consider the following options to include:  -purchasing a walker through a pharmacy or [employer] a walker from a friend or [employer] community etc.     ESRD-PD with concern for SBP/spontaneous bacterial peritonitis. You were followed by infectious disease and kidney doctors. You need to continue antibiotics, vancomycin via peritoneal drainage catheter and oral antifungal, Diflucan orally. CONSULTATIONS: You were seen by the following specialists: Orthopedic surgery, infectious disease, nephrology.     PROCEDURES/SURGERIES: * No surgery found *    PENDING TEST RESULTS:   At the time of discharge the following test results are still pending: None    FOLLOW UP APPOINTMENTS:   -PCP  -Nephrology clinic    ADDITIONAL CARE RECOMMENDATIONS:     DIET: Regular Diet, Cardiac Diet, Diabetic Diet, and Renal Diet    ACTIVITY: Activity as tolerated      EQUIPMENT needed: Walker. DISCHARGE MEDICATIONS:   See Medication Reconciliation Form    It is important that you take the medication exactly as they are prescribed. Keep your medication in the bottles provided by the pharmacist and keep a list of the medication names, dosages, and times to be taken in your wallet. Do not take other medications without consulting your doctor. NOTIFY YOUR PHYSICIAN FOR ANY OF THE FOLLOWING:   Fever over 101 degrees for 24 hours. Chest pain, shortness of breath, fever, chills, nausea, vomiting, diarrhea, change in mentation, falling, weakness, bleeding. Severe pain or pain not relieved by medications. Or, any other signs or symptoms that you may have questions about. DISPOSITION:  x  Home With:  x OT x PT x HH  RN       SNF/Inpatient Rehab/LTAC    Independent/assisted living    Hospice    Other:           Signed:    Ketty Ogden MD  3/8/2023  5:01 PM

## 2023-03-08 NOTE — PROGRESS NOTES
Transition Of Care: Rolling walker pending with [employer] (to be delivered to bedside once approved). The patient plans to discharge home with [facility] and family to transport when stable for discharge. RUR: 18%    Medicare pt has received, reviewed, and signed 2nd IM letter informing them of their right to appeal the discharge. Signed copied has been placed on pt bedside chart. The patient is a PD dialysis patient at [facility]. The patient plans to discharge home with [facility] and family to transport when stable for discharge. CM to fax dc summary to Kailee Lambert (fax: [phone], phone: [phone])    CM following for discharge needs.     Ale Alexandre RN/CRM

## 2023-03-08 NOTE — PROGRESS NOTES
Problem: Self Care Deficits Care Plan (Adult)  Goal: *Acute Goals and Plan of Care (Insert Text)  Description: FUNCTIONAL STATUS PRIOR TO ADMISSION: Patient was modified independent using a single point cane and rollator PRN for functional mobility. Patient reports she occasionally does not use AD for brief mobility in the home. HOME SUPPORT: The patient lived with son but did not require assist. Son works full time. Occupational Therapy Goals  Initiated 3/3/2023  1. Patient will perform lower body dressing with independence within 7 day(s). 2.  Patient will perform upper body dressing with independence within 7 day(s). 3.  Patient will perform grooming with independence within 7 day(s). 4.  Patient will perform toilet transfers with independence within 7 day(s). 5.  Patient will perform all aspects of toileting with independence within 7 day(s). 6.  Patient will participate in upper extremity therapeutic exercise/activities with independence for 5 minutes within 7 day(s). 7.  Patient will utilize energy conservation techniques during functional activities with verbal cues within 7 day(s). Outcome: Progressing Towards Goal     OCCUPATIONAL THERAPY TREATMENT  Patient: Edilia Ken (34 y.o. female)  Date: 3/8/2023  Diagnosis: Right leg swelling [M79.89]  Right leg pain [M79.604]  Peritoneal dialysis catheter in place [facility]) [Z99.2]  Acute hypokalemia [E87.6] <principal problem not specified>      Precautions: Fall  Chart, occupational therapy assessment, plan of care, and goals were reviewed. ASSESSMENT  Patient continues with skilled OT services and is progressing towards goals. AAOx4, pt demonstrated good activity tolerance and capacity for education during her session today. She completed bathroom mobility, toilet transfers, functional mobility (see PT note for further info) and LB dressing tasks with KI brace.  The patient needing increased assistance for toilet transfers and LB dressing due to [facility] JAZ restrictions and strength. With a BSC placed over the toilet the patient was able to transfer with SBA safely off her toilet, discussion on where to acquire in the community since she was being ordered a RW through insurance. Without the [facility] the patient was able to sit<>stand from a low height with mod to min A x2 which she described having at home. The patient needed mod A for LB dressing, issued and educated on AE for LB dressing. She was able to teach back the sock aide but needed assistance. Recommend HHOT to further work with the patient on personal bathroom safety and dressing tasks once dc. If the patient is to remain in the hospital we will continue to work with her in acute care but she is safe to dc home when medically stable with her RW and family support. Current Level of Function Impacting Discharge (ADLs): min A to mod A for LB, set/up for seated tasks     Other factors to consider for discharge: great family support, high motivation          PLAN :  Patient continues to benefit from skilled intervention to address the above impairments. Continue treatment per established plan of care to address goals. Recommend with staff: Ion Mallory for all meals, toileting in bathroom with BSC     Recommend next OT session: follow POC- AE LB dressing     Recommendation for discharge: (in order for the patient to meet his/her long term goals)  Occupational therapy at least 2 days/week in the home AND ensure assist and/or supervision for safety with ADLs    This discharge recommendation:  Has been made in collaboration with the attending provider and/or case management    IF patient discharges home will need the following DME: bedside commode- will get in the community        SUBJECTIVE:   Patient stated You are not going to be with me at home so I should try to do it myself.     OBJECTIVE DATA SUMMARY:   Cognitive/Behavioral Status:  Neurologic State: Alert  Orientation Level: Oriented X4  Cognition: Appropriate decision making; Appropriate for age attention/concentration             Functional Mobility and Transfers for ADLs:  Bed Mobility:  Sit to Supine: Supervision    Transfers:  Sit to Stand: Minimum assistance;Contact guard assistance  Functional Transfers  Bathroom Mobility: Stand-by assistance  Toilet Transfer : Moderate assistance;Assist x2 (progressing to CGA or SBA with arms of a BSC)  Bed to Chair: Contact guard assistance    Balance:  Sitting: Intact  Standing: Intact; With support  Standing - Static: Good;Constant support  Standing - Dynamic : Good;Constant support    ADL Intervention:   Progressed into the bathroom for toilet transfers and LB dressing tasks. Then completed some functional mobility prior to returning EOB for further education and use of AE for LB dressing. Grooming  Position Performed: Standing  Washing Hands: Contact guard assistance      Upper Body [address] with hospital gown: Set-up (seated)    Lower Body Dressing Assistance  Underpants: Moderate assistance  Socks: Moderate assistance  Cues: Tactile cues provided;Verbal cues provided;Visual cues provided  Adaptive Equipment Used: Long handled shoe horn;Reacher;Sock aid; Walker    Toileting  Toileting Assistance: Minimum assistance;Supervision  Bladder Hygiene: Supervision  Bowel Hygiene: Supervision  Clothing Management: Minimum assistance  First attempt without DME: mod A x2  Attempt with BSC over top toilet: CGA to SBA    Cognitive Retraining  Problem Solving: General alternative solution    Safety discussion on showering, pt to trial items dry prior to attempts wet with pressure to shower. Pt to have family to assist prior to working on the task with [address] [place] [address]. Pt verbalized understanding.      Therapeutic Exercises:   Functional Mobility: SBA to CGA once standing with RW, needs rest breaks but can access the bathroom   Sit<>Stand: higher height with hand railing: SBA to CGA, lower height: min to mod A x2, education on safe sitting practices and planning on where to sit in her home at dc    Pain:  None reported     Activity Tolerance:   Good    After treatment patient left in no apparent distress:   Supine in bed, Heels elevated for pressure relief, Call bell within reach, and Side rails x 3    COMMUNICATION/COLLABORATION:   The patients plan of care was discussed with: Physical therapist, Registered nurse, and Case management.      René Ashby  Time Calculation: 55 mins

## 2023-03-08 NOTE — PROGRESS NOTES
Problem: Mobility Impaired (Adult and Pediatric)  Goal: *Acute Goals and Plan of Care (Insert Text)  Description: FUNCTIONAL STATUS PRIOR TO ADMISSION: Patient was modified independent using a rolling walker and single point cane for functional mobility. Patient reports falls at home. HOME SUPPORT PRIOR TO ADMISSION: The patient lived with son but did not require assist.    Physical Therapy Goals  Initiated 3/3/2023  1. Patient will move from supine to sit and sit to supine  in bed with minimal assistance/contact guard assist within 7 day(s). 2.  Patient will transfer from bed to chair and chair to bed with minimal assistance/contact guard assist using the least restrictive device within 7 day(s). 3.  Patient will perform sit to stand with minimal assistance/contact guard assist within 7 day(s). 4.  Patient will ambulate with minimal assistance/contact guard assist for 50 feet with the least restrictive device within 7 day(s). 5.  Patient will ascend/descend 3 stairs with bilateral handrail(s) with minimal assistance/contact guard assist within 7 day(s). Outcome: Progressing Towards Goal     PHYSICAL THERAPY TREATMENT  Patient: Nikki Serna (59 y.o. female)  Date: 3/8/2023  Diagnosis: Right leg swelling [M79.89]  Right leg pain [M79.604]  Peritoneal dialysis catheter in place [facility]) [Z99.2]  Acute hypokalemia [E87.6] <principal problem not specified>      Precautions: Fall  Chart, physical therapy assessment, plan of care and goals were reviewed. ASSESSMENT  Patient continues with skilled PT services and is progressing towards goals. Pt generally mobilized at a Modified Independent to Min A level during today's session. Pt received sitting EOB, on RA, R knee immobilizer donned. Pt educated on proper donning and sizing of knee immobilizer and was total A for adjustment prior to mobilizing to bathroom and transferred onto/off of toilet (see OT note).  After toileting, pt ambulated short distance to WC and was rolled to stairs, educated on sequencing, and successfully performed. Pt then ambulated back to room. Pt displayed TIPTON however SpO2 98% and 's during activity. Pt requested to return to sitting EOB and educated on activity modification, fall prevention, therex and PT needs after d/c and verbalized understanding. Pt will need a RW (order placed). Pt performed multiple sit<>stand during session from various surface heights and various amounts of UE support during session. If firm handholds present, pt able to perform under modified independence however if optimal setup is not present, pt requires minor assist. Pt endorses good home support and states she will have someone to assit. Pt was provided take home gait belt and educated on use of for family assist upon d/c. At this time pt is cleared to d/c home with HHPT and family assist from a PT perspective. Current Level of Function Impacting Discharge (mobility/balance): up to Min A for tranfers    Other factors to consider for discharge: PLOF, knee immobilizer, fall hx         PLAN :  Patient continues to benefit from skilled intervention to address the above impairments. Continue treatment per established plan of care. to address goals. Recommendation for discharge: (in order for the patient to meet his/her long term goals)  Physical therapy at least 2 days/week in the home AND ensure assist and/or supervision for safety with transfers, gait, stairs    This discharge recommendation:  Has been made in collaboration with the attending provider and/or case management    IF patient discharges home will need the following DME: RW (order placed)       SUBJECTIVE:   Patient stated thank you.     OBJECTIVE DATA SUMMARY:   Critical Behavior:  Neurologic State: Alert  Orientation Level: Oriented X4  Cognition: Appropriate decision making, Appropriate for age attention/concentration  Safety/Judgement: Awareness of environment  Functional Mobility Training:  Bed Mobility:        Sit to Supine: Supervision           Transfers:  Sit to Stand: Minimum assistance;Contact guard assistance  Stand to Sit: Supervision        Bed to Chair: Contact guard assistance                    Balance:  Sitting: Intact  Standing: Intact; With support  Standing - Static: Good;Constant support  Standing - Dynamic : Good;Constant support  Ambulation/Gait Training:  Distance (ft): 120 Feet (ft)  Assistive Device: Gait belt;Walker, rolling  Ambulation - Level of Assistance: Modified independent        Gait Abnormalities: Antalgic;Decreased step clearance (no L knee flexion 2/2 knee immobilizer)        Base of Support: Widened;Center of gravity altered;Shift to left  Stance: Right decreased  Speed/Maryann: Slow     Swing Pattern: Right asymmetrical     Interventions: Safety awareness training;Verbal cues       Stairs:  Number of Stairs Trained: 3  Stairs - Level of Assistance: Supervision   Rail Use: Right     Pain Rating:  Pt did not verbalize pain during session. Activity Tolerance:   Fair, SpO2 stable on RA, and observed SOB with activity    After treatment patient left in no apparent distress:   Call bell within reach and sitting EOB, OT in room    COMMUNICATION/COLLABORATION:   The patients plan of care was discussed with: Occupational therapist, Registered nurse, and Case management.      Jameson To PT   Time Calculation: 55 mins

## 2023-03-08 NOTE — DIALYSIS
Peritoneal Dialysis Disconnection / [phone]     Metrics   I-Drain: 8302   Total UF: 4172   Last Fill: 2000  FROM MID DAY MANUAL   Last Manual Drain: 0   Net UF:         6692   Avg Dwell Time: 1:07   Lost Dwell Time: 1:12   Alarms: One alarm low drain    Effluent: Clear;yellow visualized in toilet     Access   Type & Location: Mid abdomen,    Comments: Prior to disconnection, one-minute Alcavis scrub performed, followed by one minute soak per policy. Sterile mini cap applied and secured to abdomen. Dsg clean, dry and intact.   Dsg date: 03/07/23                                           Comments:   ONE RECORDED ALARM FOR LOST DWELL OF 1;12- LOW DRAIN

## 2023-03-08 NOTE — PROGRESS NOTES
Spoke with the patient this afternoon- Home PD nurse aware that she received Vanc IP today will need repeat dose on 3/13- with vanc level prior. She can be discharged from renal standpoint. She does not need to wait to drain- she is to connect herself to cycler tonight using 2.5% Solution, then restart her Exraneal (purple) bag- Home PD nurse Skylar Atkinson) will follow up with th patient in AM.  Patient given on call number incase she has any questions or concerns. Outpatient Nephrologist updated.

## 2023-03-08 NOTE — DIALYSIS
MANUAL MID DAY EXCHANGE  Before fill patient was drained with no measure output. Patient has \"dry\" days with cycler treatment while in hospital  VOLUME:  2000 ml with VANCOMYCIN 1500 MG  Duration:  4 hours minimum dwell time    Start time: 1330  End time:  1730    One minute alcavis scrub followed by one minute soak prior to connection and repeated prior to disconnection with minicap applied to tip. Dressing change to lower quadrant PD catheter per policy and dated.

## 2023-04-07 ENCOUNTER — TELEPHONE (OUTPATIENT)
Dept: CARDIOLOGY CLINIC | Age: 68
End: 2023-04-07

## 2023-04-17 ENCOUNTER — HOME HEALTH ADMISSION (OUTPATIENT)
Dept: HOME HEALTH SERVICES | Facility: HOME HEALTH | Age: 68
End: 2023-04-17

## 2023-07-17 ENCOUNTER — TRANSCRIBE ORDERS (OUTPATIENT)
Facility: HOSPITAL | Age: 68
End: 2023-07-17

## 2023-07-17 ENCOUNTER — HOSPITAL ENCOUNTER (OUTPATIENT)
Facility: HOSPITAL | Age: 68
Discharge: HOME | End: 2023-07-20
Payer: MEDICARE

## 2023-07-17 DIAGNOSIS — R19.7 DIARRHEA, UNSPECIFIED TYPE: Primary | ICD-10-CM

## 2023-07-17 DIAGNOSIS — Z99.2 PERITONEAL DIALYSIS CATHETER IN PLACE (HCC): ICD-10-CM

## 2023-07-17 DIAGNOSIS — K29.70 HELICOBACTER PYLORI GASTRITIS: ICD-10-CM

## 2023-07-17 DIAGNOSIS — B96.81 HELICOBACTER PYLORI GASTRITIS: ICD-10-CM

## 2023-07-17 DIAGNOSIS — Q40.3 GASTRIC DYSPLASIA: ICD-10-CM

## 2023-07-17 DIAGNOSIS — R10.13 DYSPEPSIA: ICD-10-CM

## 2023-07-17 DIAGNOSIS — N18.6 END STAGE RENAL DISEASE (HCC): ICD-10-CM

## 2023-07-17 DIAGNOSIS — R19.7 DIARRHEA, UNSPECIFIED TYPE: ICD-10-CM

## 2023-07-17 DIAGNOSIS — K58.1 IRRITABLE BOWEL SYNDROME WITH CONSTIPATION: ICD-10-CM

## 2023-07-17 PROCEDURE — 74018 RADEX ABDOMEN 1 VIEW: CPT

## 2023-07-18 ENCOUNTER — HOSPITAL ENCOUNTER (OUTPATIENT)
Facility: HOSPITAL | Age: 68
Setting detail: OUTPATIENT SURGERY
Discharge: STILL ADMITTED OR ADMINISTRATIVE | DRG: 640 | End: 2023-07-18
Attending: INTERNAL MEDICINE | Admitting: INTERNAL MEDICINE
Payer: MEDICARE

## 2023-07-18 ENCOUNTER — ANESTHESIA EVENT (OUTPATIENT)
Facility: HOSPITAL | Age: 68
End: 2023-07-18
Payer: MEDICARE

## 2023-07-18 ENCOUNTER — ANESTHESIA (OUTPATIENT)
Facility: HOSPITAL | Age: 68
End: 2023-07-18
Payer: MEDICARE

## 2023-07-18 ENCOUNTER — HOSPITAL ENCOUNTER (INPATIENT)
Facility: HOSPITAL | Age: 68
LOS: 6 days | Discharge: HOME | DRG: 640 | End: 2023-07-24
Attending: EMERGENCY MEDICINE | Admitting: INTERNAL MEDICINE
Payer: MEDICARE

## 2023-07-18 VITALS
DIASTOLIC BLOOD PRESSURE: 80 MMHG | WEIGHT: 188 LBS | RESPIRATION RATE: 19 BRPM | OXYGEN SATURATION: 99 % | HEIGHT: 64 IN | SYSTOLIC BLOOD PRESSURE: 180 MMHG | BODY MASS INDEX: 32.1 KG/M2 | HEART RATE: 99 BPM

## 2023-07-18 DIAGNOSIS — N18.6 END STAGE RENAL DISEASE (HCC): ICD-10-CM

## 2023-07-18 DIAGNOSIS — E87.6 HYPOKALEMIA: Primary | ICD-10-CM

## 2023-07-18 DIAGNOSIS — M54.50 ACUTE LOW BACK PAIN, UNSPECIFIED BACK PAIN LATERALITY, UNSPECIFIED WHETHER SCIATICA PRESENT: ICD-10-CM

## 2023-07-18 LAB
ALBUMIN SERPL-MCNC: 1.5 G/DL (ref 3.5–5)
ALBUMIN/GLOB SERPL: 0.3 (ref 1.1–2.2)
ALP SERPL-CCNC: 130 U/L (ref 45–117)
ALT SERPL-CCNC: 8 U/L (ref 12–78)
ANION GAP BLD CALC-SCNC: 13 MMOL/L (ref 10–20)
ANION GAP BLD CALC-SCNC: 14 MMOL/L (ref 10–20)
ANION GAP SERPL CALC-SCNC: 8 MMOL/L (ref 5–15)
AST SERPL-CCNC: 13 U/L (ref 15–37)
BASOPHILS # BLD: 0 K/UL (ref 0–0.1)
BASOPHILS NFR BLD: 0 % (ref 0–1)
BILIRUB SERPL-MCNC: 0.2 MG/DL (ref 0.2–1)
BUN SERPL-MCNC: 14 MG/DL (ref 6–20)
BUN/CREAT SERPL: 3 (ref 12–20)
CA-I BLD-MCNC: 0.87 MMOL/L (ref 1.12–1.32)
CA-I BLD-MCNC: 0.88 MMOL/L (ref 1.12–1.32)
CALCIUM SERPL-MCNC: 6.5 MG/DL (ref 8.5–10.1)
CHLORIDE BLD-SCNC: 91 MMOL/L (ref 98–107)
CHLORIDE BLD-SCNC: 92 MMOL/L (ref 98–107)
CHLORIDE SERPL-SCNC: 94 MMOL/L (ref 97–108)
CO2 BLD-SCNC: 32.7 MMOL/L (ref 21–32)
CO2 BLD-SCNC: 35.2 MMOL/L (ref 21–32)
CO2 SERPL-SCNC: 34 MMOL/L (ref 21–32)
CREAT BLD-MCNC: 5.31 MG/DL (ref 0.6–1.3)
CREAT BLD-MCNC: 5.43 MG/DL (ref 0.6–1.3)
CREAT SERPL-MCNC: 4.6 MG/DL (ref 0.55–1.02)
DIFFERENTIAL METHOD BLD: ABNORMAL
EOSINOPHIL # BLD: 0.2 K/UL (ref 0–0.4)
EOSINOPHIL NFR BLD: 2 % (ref 0–7)
ERYTHROCYTE [DISTWIDTH] IN BLOOD BY AUTOMATED COUNT: 19.8 % (ref 11.5–14.5)
GLOBULIN SER CALC-MCNC: 4.8 G/DL (ref 2–4)
GLUCOSE BLD STRIP.AUTO-MCNC: 145 MG/DL (ref 65–117)
GLUCOSE BLD STRIP.AUTO-MCNC: 158 MG/DL (ref 65–117)
GLUCOSE BLD-MCNC: 145 MG/DL (ref 65–100)
GLUCOSE BLD-MCNC: 148 MG/DL (ref 65–100)
GLUCOSE SERPL-MCNC: 140 MG/DL (ref 65–100)
HCT VFR BLD AUTO: 24.3 % (ref 35–47)
HGB BLD-MCNC: 7.7 G/DL (ref 11.5–16)
IMM GRANULOCYTES # BLD AUTO: 0 K/UL (ref 0–0.04)
IMM GRANULOCYTES NFR BLD AUTO: 0 % (ref 0–0.5)
LYMPHOCYTES # BLD: 1.7 K/UL (ref 0.8–3.5)
LYMPHOCYTES NFR BLD: 18 % (ref 12–49)
MAGNESIUM SERPL-MCNC: 1.4 MG/DL (ref 1.6–2.4)
MCH RBC QN AUTO: 27.2 PG (ref 26–34)
MCHC RBC AUTO-ENTMCNC: 31.7 G/DL (ref 30–36.5)
MCV RBC AUTO: 85.9 FL (ref 80–99)
MONOCYTES # BLD: 0.7 K/UL (ref 0–1)
MONOCYTES NFR BLD: 7 % (ref 5–13)
NEUTS SEG # BLD: 7.1 K/UL (ref 1.8–8)
NEUTS SEG NFR BLD: 73 % (ref 32–75)
NRBC # BLD: 0 K/UL (ref 0–0.01)
NRBC BLD-RTO: 0 PER 100 WBC
PLATELET # BLD AUTO: 322 K/UL (ref 150–400)
PMV BLD AUTO: 9.7 FL (ref 8.9–12.9)
POTASSIUM BLD-SCNC: 1.7 MMOL/L (ref 3.5–5.1)
POTASSIUM BLD-SCNC: <1.5 MMOL/L (ref 3.5–5.1)
POTASSIUM SERPL-SCNC: 1.5 MMOL/L (ref 3.5–5.1)
PROT SERPL-MCNC: 6.3 G/DL (ref 6.4–8.2)
RBC # BLD AUTO: 2.83 M/UL (ref 3.8–5.2)
SERVICE CMNT-IMP: ABNORMAL
SODIUM BLD-SCNC: 138 MMOL/L (ref 136–145)
SODIUM BLD-SCNC: 138 MMOL/L (ref 136–145)
SODIUM SERPL-SCNC: 136 MMOL/L (ref 136–145)
WBC # BLD AUTO: 9.7 K/UL (ref 3.6–11)

## 2023-07-18 PROCEDURE — 85025 COMPLETE CBC W/AUTO DIFF WBC: CPT

## 2023-07-18 PROCEDURE — 2580000003 HC RX 258: Performed by: INTERNAL MEDICINE

## 2023-07-18 PROCEDURE — 6360000002 HC RX W HCPCS: Performed by: INTERNAL MEDICINE

## 2023-07-18 PROCEDURE — 6370000000 HC RX 637 (ALT 250 FOR IP): Performed by: INTERNAL MEDICINE

## 2023-07-18 PROCEDURE — 6360000002 HC RX W HCPCS: Performed by: EMERGENCY MEDICINE

## 2023-07-18 PROCEDURE — 2060000000 HC ICU INTERMEDIATE R&B

## 2023-07-18 PROCEDURE — 99285 EMERGENCY DEPT VISIT HI MDM: CPT

## 2023-07-18 PROCEDURE — 6370000000 HC RX 637 (ALT 250 FOR IP): Performed by: EMERGENCY MEDICINE

## 2023-07-18 PROCEDURE — 80053 COMPREHEN METABOLIC PANEL: CPT

## 2023-07-18 PROCEDURE — 80047 BASIC METABLC PNL IONIZED CA: CPT

## 2023-07-18 PROCEDURE — 82962 GLUCOSE BLOOD TEST: CPT

## 2023-07-18 PROCEDURE — 83735 ASSAY OF MAGNESIUM: CPT

## 2023-07-18 PROCEDURE — 36415 COLL VENOUS BLD VENIPUNCTURE: CPT

## 2023-07-18 RX ORDER — HEPARIN SODIUM 5000 [USP'U]/ML
5000 INJECTION, SOLUTION INTRAVENOUS; SUBCUTANEOUS EVERY 8 HOURS SCHEDULED
Status: DISPENSED | OUTPATIENT
Start: 2023-07-19 | End: 2023-07-24

## 2023-07-18 RX ORDER — ROSUVASTATIN CALCIUM 40 MG/1
40 TABLET, COATED ORAL DAILY
Status: DISCONTINUED | OUTPATIENT
Start: 2023-07-19 | End: 2023-07-24 | Stop reason: HOSPADM

## 2023-07-18 RX ORDER — METHOCARBAMOL 500 MG/1
500 TABLET, FILM COATED ORAL 3 TIMES DAILY
COMMUNITY
Start: 2023-04-25

## 2023-07-18 RX ORDER — ONDANSETRON 2 MG/ML
4 INJECTION INTRAMUSCULAR; INTRAVENOUS EVERY 6 HOURS PRN
Status: DISCONTINUED | OUTPATIENT
Start: 2023-07-18 | End: 2023-07-24 | Stop reason: HOSPADM

## 2023-07-18 RX ORDER — ACETAMINOPHEN 650 MG/1
650 SUPPOSITORY RECTAL EVERY 6 HOURS PRN
Status: DISCONTINUED | OUTPATIENT
Start: 2023-07-18 | End: 2023-07-24 | Stop reason: HOSPADM

## 2023-07-18 RX ORDER — HYDRALAZINE HYDROCHLORIDE 50 MG/1
50 TABLET, FILM COATED ORAL 3 TIMES DAILY
Status: DISCONTINUED | OUTPATIENT
Start: 2023-07-18 | End: 2023-07-24 | Stop reason: HOSPADM

## 2023-07-18 RX ORDER — POLYETHYLENE GLYCOL 3350 17 G/17G
17 POWDER, FOR SOLUTION ORAL DAILY
Status: DISCONTINUED | OUTPATIENT
Start: 2023-07-18 | End: 2023-07-24 | Stop reason: HOSPADM

## 2023-07-18 RX ORDER — ACETAMINOPHEN 325 MG/1
650 TABLET ORAL EVERY 6 HOURS PRN
Status: DISCONTINUED | OUTPATIENT
Start: 2023-07-18 | End: 2023-07-24 | Stop reason: HOSPADM

## 2023-07-18 RX ORDER — BISACODYL 10 MG
10 SUPPOSITORY, RECTAL RECTAL DAILY PRN
Status: DISCONTINUED | OUTPATIENT
Start: 2023-07-18 | End: 2023-07-24 | Stop reason: HOSPADM

## 2023-07-18 RX ORDER — DEXTROSE MONOHYDRATE 100 MG/ML
INJECTION, SOLUTION INTRAVENOUS CONTINUOUS PRN
Status: DISCONTINUED | OUTPATIENT
Start: 2023-07-18 | End: 2023-07-24 | Stop reason: HOSPADM

## 2023-07-18 RX ORDER — DULOXETIN HYDROCHLORIDE 20 MG/1
20 CAPSULE, DELAYED RELEASE ORAL 2 TIMES DAILY
Status: DISCONTINUED | OUTPATIENT
Start: 2023-07-18 | End: 2023-07-24 | Stop reason: HOSPADM

## 2023-07-18 RX ORDER — MAGNESIUM SULFATE IN WATER 40 MG/ML
2000 INJECTION, SOLUTION INTRAVENOUS ONCE
Status: COMPLETED | OUTPATIENT
Start: 2023-07-18 | End: 2023-07-18

## 2023-07-18 RX ORDER — POTASSIUM CHLORIDE 7.45 MG/ML
10 INJECTION INTRAVENOUS
Status: DISPENSED | OUTPATIENT
Start: 2023-07-18 | End: 2023-07-18

## 2023-07-18 RX ORDER — INSULIN LISPRO 100 [IU]/ML
0-4 INJECTION, SOLUTION INTRAVENOUS; SUBCUTANEOUS NIGHTLY
Status: DISCONTINUED | OUTPATIENT
Start: 2023-07-18 | End: 2023-07-24 | Stop reason: HOSPADM

## 2023-07-18 RX ORDER — SODIUM CHLORIDE 0.9 % (FLUSH) 0.9 %
5-40 SYRINGE (ML) INJECTION EVERY 12 HOURS SCHEDULED
Status: DISCONTINUED | OUTPATIENT
Start: 2023-07-18 | End: 2023-07-24 | Stop reason: HOSPADM

## 2023-07-18 RX ORDER — POTASSIUM CHLORIDE 7.45 MG/ML
10 INJECTION INTRAVENOUS ONCE
Status: DISCONTINUED | OUTPATIENT
Start: 2023-07-18 | End: 2023-07-18 | Stop reason: SDUPTHER

## 2023-07-18 RX ORDER — LANOLIN ALCOHOL/MO/W.PET/CERES
3 CREAM (GRAM) TOPICAL NIGHTLY PRN
Status: DISCONTINUED | OUTPATIENT
Start: 2023-07-18 | End: 2023-07-24 | Stop reason: HOSPADM

## 2023-07-18 RX ORDER — SODIUM CHLORIDE 9 MG/ML
INJECTION, SOLUTION INTRAVENOUS PRN
Status: DISCONTINUED | OUTPATIENT
Start: 2023-07-18 | End: 2023-07-24 | Stop reason: HOSPADM

## 2023-07-18 RX ORDER — POTASSIUM CHLORIDE 7.45 MG/ML
10 INJECTION INTRAVENOUS
Status: COMPLETED | OUTPATIENT
Start: 2023-07-18 | End: 2023-07-18

## 2023-07-18 RX ORDER — LIDOCAINE 4 G/G
1 PATCH TOPICAL
Status: COMPLETED | OUTPATIENT
Start: 2023-07-18 | End: 2023-07-19

## 2023-07-18 RX ORDER — ONDANSETRON 4 MG/1
4 TABLET, ORALLY DISINTEGRATING ORAL EVERY 8 HOURS PRN
Status: DISCONTINUED | OUTPATIENT
Start: 2023-07-18 | End: 2023-07-24 | Stop reason: HOSPADM

## 2023-07-18 RX ORDER — TRAMADOL HYDROCHLORIDE 50 MG/1
50 TABLET ORAL EVERY 6 HOURS PRN
Status: DISCONTINUED | OUTPATIENT
Start: 2023-07-18 | End: 2023-07-24 | Stop reason: HOSPADM

## 2023-07-18 RX ORDER — INSULIN GLARGINE 100 [IU]/ML
20 INJECTION, SOLUTION SUBCUTANEOUS
Status: DISCONTINUED | OUTPATIENT
Start: 2023-07-19 | End: 2023-07-21

## 2023-07-18 RX ORDER — SODIUM CHLORIDE 9 MG/ML
25 INJECTION, SOLUTION INTRAVENOUS PRN
Status: CANCELLED | OUTPATIENT
Start: 2023-07-18

## 2023-07-18 RX ORDER — SODIUM CHLORIDE 0.9 % (FLUSH) 0.9 %
5-40 SYRINGE (ML) INJECTION PRN
Status: DISCONTINUED | OUTPATIENT
Start: 2023-07-18 | End: 2023-07-24 | Stop reason: HOSPADM

## 2023-07-18 RX ORDER — CARVEDILOL 12.5 MG/1
25 TABLET ORAL 2 TIMES DAILY WITH MEALS
Status: DISCONTINUED | OUTPATIENT
Start: 2023-07-18 | End: 2023-07-24 | Stop reason: HOSPADM

## 2023-07-18 RX ORDER — FENTANYL CITRATE 50 UG/ML
25 INJECTION, SOLUTION INTRAMUSCULAR; INTRAVENOUS
Status: COMPLETED | OUTPATIENT
Start: 2023-07-18 | End: 2023-07-18

## 2023-07-18 RX ORDER — ASPIRIN 325 MG
325 TABLET ORAL DAILY
Status: DISCONTINUED | OUTPATIENT
Start: 2023-07-19 | End: 2023-07-24 | Stop reason: HOSPADM

## 2023-07-18 RX ORDER — INSULIN LISPRO 100 [IU]/ML
0-8 INJECTION, SOLUTION INTRAVENOUS; SUBCUTANEOUS
Status: DISCONTINUED | OUTPATIENT
Start: 2023-07-18 | End: 2023-07-24 | Stop reason: HOSPADM

## 2023-07-18 RX ORDER — AMLODIPINE BESYLATE 5 MG/1
10 TABLET ORAL DAILY
Status: DISCONTINUED | OUTPATIENT
Start: 2023-07-19 | End: 2023-07-24 | Stop reason: HOSPADM

## 2023-07-18 RX ORDER — SODIUM CHLORIDE 0.9 % (FLUSH) 0.9 %
5-40 SYRINGE (ML) INJECTION PRN
Status: CANCELLED | OUTPATIENT
Start: 2023-07-18

## 2023-07-18 RX ORDER — MONTELUKAST SODIUM 10 MG/1
10 TABLET ORAL NIGHTLY
Status: DISCONTINUED | OUTPATIENT
Start: 2023-07-18 | End: 2023-07-24 | Stop reason: HOSPADM

## 2023-07-18 RX ORDER — SODIUM CHLORIDE 0.9 % (FLUSH) 0.9 %
5-40 SYRINGE (ML) INJECTION EVERY 12 HOURS SCHEDULED
Status: CANCELLED | OUTPATIENT
Start: 2023-07-18

## 2023-07-18 RX ORDER — IPRATROPIUM BROMIDE AND ALBUTEROL SULFATE 2.5; .5 MG/3ML; MG/3ML
1 SOLUTION RESPIRATORY (INHALATION) EVERY 4 HOURS PRN
Status: DISCONTINUED | OUTPATIENT
Start: 2023-07-18 | End: 2023-07-24 | Stop reason: HOSPADM

## 2023-07-18 RX ADMIN — POTASSIUM CHLORIDE 10 MEQ: 7.46 INJECTION, SOLUTION INTRAVENOUS at 15:07

## 2023-07-18 RX ADMIN — POTASSIUM CHLORIDE 10 MEQ: 7.46 INJECTION, SOLUTION INTRAVENOUS at 18:39

## 2023-07-18 RX ADMIN — POTASSIUM CHLORIDE 10 MEQ: 7.46 INJECTION, SOLUTION INTRAVENOUS at 20:46

## 2023-07-18 RX ADMIN — POTASSIUM CHLORIDE 10 MEQ: 7.46 INJECTION, SOLUTION INTRAVENOUS at 19:51

## 2023-07-18 RX ADMIN — FENTANYL CITRATE 25 MCG: 50 INJECTION, SOLUTION INTRAMUSCULAR; INTRAVENOUS at 18:13

## 2023-07-18 RX ADMIN — POTASSIUM CHLORIDE 10 MEQ: 7.46 INJECTION, SOLUTION INTRAVENOUS at 17:25

## 2023-07-18 RX ADMIN — SODIUM CHLORIDE, PRESERVATIVE FREE 10 ML: 5 INJECTION INTRAVENOUS at 20:46

## 2023-07-18 RX ADMIN — SODIUM CHLORIDE: 9 INJECTION, SOLUTION INTRAVENOUS at 15:10

## 2023-07-18 RX ADMIN — MAGNESIUM SULFATE HEPTAHYDRATE 2000 MG: 40 INJECTION, SOLUTION INTRAVENOUS at 14:03

## 2023-07-18 RX ADMIN — POTASSIUM BICARBONATE 40 MEQ: 782 TABLET, EFFERVESCENT ORAL at 18:38

## 2023-07-18 ASSESSMENT — LIFESTYLE VARIABLES
HOW OFTEN DO YOU HAVE A DRINK CONTAINING ALCOHOL: NEVER
HOW MANY STANDARD DRINKS CONTAINING ALCOHOL DO YOU HAVE ON A TYPICAL DAY: PATIENT DOES NOT DRINK

## 2023-07-18 ASSESSMENT — PAIN - FUNCTIONAL ASSESSMENT: PAIN_FUNCTIONAL_ASSESSMENT: NONE - DENIES PAIN

## 2023-07-18 ASSESSMENT — PAIN DESCRIPTION - LOCATION: LOCATION: BACK

## 2023-07-18 ASSESSMENT — PAIN SCALES - GENERAL: PAINLEVEL_OUTOF10: 7

## 2023-07-18 ASSESSMENT — PAIN DESCRIPTION - DESCRIPTORS: DESCRIPTORS: DISCOMFORT;SPASM

## 2023-07-18 ASSESSMENT — ENCOUNTER SYMPTOMS: SHORTNESS OF BREATH: 1

## 2023-07-18 ASSESSMENT — PAIN DESCRIPTION - ORIENTATION: ORIENTATION: LEFT;MID

## 2023-07-18 ASSESSMENT — PAIN DESCRIPTION - PAIN TYPE: TYPE: CHRONIC PAIN

## 2023-07-18 NOTE — ANESTHESIA PRE PROCEDURE
PREGTESTUR, PREGSERUM, HCG, HCGQUANT     ABGs: No results found for: PHART, PO2ART, EFV2EIN, RGG1FME, BEART, G8BAXLHN     Type & Screen (If Applicable):  No results found for: LABABO, LABRH    Drug/Infectious Status (If Applicable):  No results found for: HIV, HEPCAB    COVID-19 Screening (If Applicable):   Lab Results   Component Value Date/Time    COVID19 Not detected 05/15/2022 04:57 AM    COVID19 Not detected 01/14/2022 06:29 AM           Anesthesia Evaluation    Airway: Mallampati: II          Dental: normal exam   (+) edentulous      Pulmonary:normal exam    (+) COPD:  shortness of breath:  asthma:                            Cardiovascular:    (+) hypertension:, past MI:, CAD:, CHF:, hyperlipidemia                  Neuro/Psych:               GI/Hepatic/Renal:   (+) GERD:, renal disease: dialysis,           Endo/Other:    (+) Diabetes, . Abdominal: normal exam            Vascular: Other Findings:           Anesthesia Plan      general     ASA 3       Induction: intravenous. Anesthetic plan and risks discussed with patient. Use of blood products discussed with patient whom. Plan discussed with CRNA.                     Giovanny Melchor MD   7/18/2023

## 2023-07-18 NOTE — PROGRESS NOTES
Patient is a peritoneal dialysis patient who has nightly PD. Last week, dialysis told her she had low potassium and prescribed oral potassium but patient has not filled the prescription as of yet. EPOC done at bedside prior to procedure and potassium came back at 1.7. Re-draw completed at this time and came back at 1.5. Dr. Art Ryder with Anesthesia and Dr. Sherrill Cole with [facility] request patient to be seen in the ER for a full work-up due to hypokalemia. Patient is updated and it tearful but agreeable. Patient's son, Mahesh Strong, to be updated and brought to ER room 30 per ER charge nurse.

## 2023-07-18 NOTE — H&P
Hospitalist Admission Note    NAME:   Flaquita Muller   : 1955   MRN: [id number]     Date/Time: 2023 2:10 PM    Patient PCP: Jayla Rao MD    ______________________________________________________________________  Given the patient's current clinical presentation, I have a high level of concern for decompensation if discharged from the emergency department. Complex decision making was performed, which includes reviewing the patient's available past medical records, laboratory results, and x-ray films. My assessment of this patient's clinical condition and my plan of care is as follows. Assessment / Plan:    Severe hypokalemia K 1.5 POA suspected PD and GI losses  Hypomagnesemia Mg 1.4 POA  Generalized weakness likely secondary to hypokalemia POA  End-stage renal disease on peritoneal dialysis POA  Presented for EGD today as outpatient   Lab check revealed K 1.5, sent to ED  Generalized weakness over past week or two   \"Barely able to walk\"  K 1.5 in ED, mag low  Suspect losses with PD and diarrhea  EKG NSR, mildly prolonged Qtc, non specific T changes  Admit to stepdown  Telemetry  P.o. KCl 40 mEq x 2  KCl 10 mEq IV runs x 5  IV magnesium in ED  Hold PD tonight  Takes p.o.  Lasix at home, will hold  Work up diarrhea  Recheck labs after IV once completed   Supplement additional IV runs of potassium based on the level  Renal consult, I discussed the case and management with Dr. Drew Jade    Recent watery diarrhea POA x 1-2  weeks  Etiology unclear, multiple episodes per day  Has been having diarrhea longer, but \"comes and goes\"  Increased severity over the past 1 to 2 weeks  Check stool studies including C. difficile given her recent H. pylori treatment  GI consult in AM Dr Kevin Peña    Diabetes mellitus type 2 on chronic insulin, causing end-stage renal disease POA  Home regimen: Lantus 20 units every morning, Trulicity 6.16 mg weekly  Diabetic diet  Continue Lantus, hold

## 2023-07-18 NOTE — ED PROVIDER NOTES
[facility] EMERGENCY DEPT  EMERGENCY DEPARTMENT ENCOUNTER       Pt Name: Tonny Vinson  MRN: [id number]  [address] [facility] 1955  Date of evaluation: 7/18/2023  Provider: Elba Malave MD   PCP: Herb Hays MD  Note Started: 12:15 PM EDT 7/18/23     CHIEF COMPLAINT       Chief Complaint   Patient presents with    Other     Abnormal K of 1.7 found during routine endoscopy, pt has no complaints        HISTORY OF PRESENT ILLNESS: 1 or more elements      History From: patient, family History limited by: none     Tonny Vinson is a 75 y.o. female with a history of CAD, CHF, CKD on peritoneal dialysis and diabetes presents emergency department as a transfer from outpatient endoscopy for abnormal labs. Patient reports she has no complaints currently. She denies chest pain or shortness of breath. She denies abdominal pain. She denies issues with her peritoneal dialysis including changes in dialysate or cloudy dialysate. She denies fevers or chills or generalized weakness. Patient does report she has had intermittent diarrhea x2 weeks. Please See [facility] for Additional Details of the HPI/PMH  Nursing Notes were all reviewed and agreed with or any disagreements were addressed in the HPI. REVIEW OF SYSTEMS        Positives and Pertinent negatives as per HPI.     PAST HISTORY     Past Medical History:  Past Medical History:   Diagnosis Date    Asthma     CAD (coronary artery disease)     CHF (congestive heart failure) ([facility])      ?    Chronic kidney disease     HD M-W-F Eduardo Wright [phone]    COPD (chronic obstructive pulmonary disease) ([facility])     PCP manages    Diabetes ([facility])     DM2    GERD (gastroesophageal reflux disease)     Hypertension        Past Surgical History:  Past Surgical History:   Procedure Laterality Date    COLONOSCOPY N/A 1/9/2023    COLONOSCOPY performed by Jakob Tidwell MD at [address]      multiple pt unsure of how many    DELIVERY

## 2023-07-18 NOTE — H&P
[facility]  Outpatient History and Physical    Patient: Sunshine Martinez    Physician: Gato Abrams MD    Vital Signs: Blood pressure (!) 180/80, pulse 99, resp. rate 19, height 1.626 m (5' 4\"), weight 85.3 kg (188 lb), SpO2 99 %. Allergies: Allergies   Allergen Reactions    Fd&C Blue #1 (Brilliant Blue Fcf) Hives       Chief Complaint: H pylori gastritis and low-grade dysplasia, here for reassessment and mapping      History:  Past Medical History:   Diagnosis Date    Asthma     CAD (coronary artery disease)     CHF (congestive heart failure) ([facility])     Dr ?    Chronic kidney disease     HD - Eduardo Wright [phone]    COPD (chronic obstructive pulmonary disease) ([facility])     PCP manages    Diabetes ([facility])     DM2    GERD (gastroesophageal reflux disease)     Hypertension       Past Surgical History:   Procedure Laterality Date    COLONOSCOPY N/A 2023    COLONOSCOPY performed by Gato Abrams MD at [facility] [address] [place]      multiple pt unsure of how many    DELIVERY       HYSTERECTOMY (CERVIX STATUS UNKNOWN)      IR NONTUNNELED VASCULAR CATHETER  12/3/2021    IR NONTUNNELED VASCULAR CATHETER 12/3/2021 [facility] RAD ANGIO IR    IR NONTUNNELED VASCULAR CATHETER  2021    IR TUNNELED CATHETER PLACEMENT GREATER THAN 5 YEARS  2021    IR TUNNELED CATHETER PLACEMENT GREATER THAN 5 YEARS 2021 [facility] RAD ANGIO IR    IR TUNNELED CATHETER PLACEMENT GREATER THAN 5 YEARS  2021    peritoneal dialysis    MN UNLISTED PROCEDURE CARDIAC SURGERY      VASCULAR SURGERY      leg stent      Social History     Socioeconomic History    Marital status: Single     Spouse name: None    Number of children: None    Years of education: None    Highest education level: None   Tobacco Use    Smoking status: Former     Packs/day: 0.25     Types: Cigarettes    Smokeless tobacco: Never   Substance and Sexual Activity    Alcohol use:  No

## 2023-07-18 NOTE — CONSULTS
Rosario Valenzuela, NP-C  [phone] cell     Gastroenterology Consultation Note      Admit Date: 7/18/2023  Consult Date: 7/18/2023   I greatly appreciate your asking me to see Olegario Nelson, thank you very much for the opportunity to participate in her care. Narrative Assessment and Plan   GI consultation for diarrhea. 76 y/o female with history of CAD, CHF, ESRD on PD and diabetes. Was to have EGD day of admission but potassium 1.7 so sent to ER. EGD was for reassessment and mapping after have gastric IM and HPY+ gastritis on EGD 1/2023 which was treated with Pylera. Reports diarrhea for at least the last month. Seen in office 6/2/2023 and stated she had diarrhea before that visit but it had resolved and her bowels had not moved for 8 days. History of IBS-C with some components of mixed type IBS as she sometimes alternated between diarrhea and constipation. She has taken Trulance in the past but most recently was taking Metamucil and miralax. KUB yesterday without acute findings. Impression:  Diarrhea  History of IBS-C  CAD  CHF  ESRD on PD  Type 2 DM  History of HPY gastritis with gastric IM    Stool studies pending, follow for results. May need colonoscopy or at least flex sig for random biopsies to rule out microscopic colitis or other causes of diarrhea. This could be done with EGD already planned. Ok for CLD      Subjective:     Chief Complaint: diarrhea    History of Present Illness:  75 y.o. female with a history of CAD, CHF, ESRD on peritoneal dialysis and diabetes was sent to ER from endoscopy today due to potassium being 1.7. She was scheduled for EGD today for reassessment and mapping after treatment for HPY gastritis found on EGD in January (EGD and colonoscopy results below). She had 8 polyps removed during colonoscopy. 7 were adenomatous, 1 HPP. No random biopsies collected.  She has history of alternating between diarrhea and constipation

## 2023-07-19 LAB
ALBUMIN SERPL-MCNC: 1.4 G/DL (ref 3.5–5)
ALBUMIN/GLOB SERPL: 0.3 (ref 1.1–2.2)
ALP SERPL-CCNC: 131 U/L (ref 45–117)
ALT SERPL-CCNC: 9 U/L (ref 12–78)
ANION GAP SERPL CALC-SCNC: 10 MMOL/L (ref 5–15)
ANION GAP SERPL CALC-SCNC: 10 MMOL/L (ref 5–15)
ANION GAP SERPL CALC-SCNC: 5 MMOL/L (ref 5–15)
ANION GAP SERPL CALC-SCNC: 8 MMOL/L (ref 5–15)
AST SERPL-CCNC: 16 U/L (ref 15–37)
BASOPHILS # BLD: 0 K/UL (ref 0–0.1)
BASOPHILS NFR BLD: 0 % (ref 0–1)
BILIRUB SERPL-MCNC: 0.3 MG/DL (ref 0.2–1)
BUN SERPL-MCNC: 13 MG/DL (ref 6–20)
BUN SERPL-MCNC: 15 MG/DL (ref 6–20)
BUN/CREAT SERPL: 3 (ref 12–20)
CALCIUM SERPL-MCNC: 6.1 MG/DL (ref 8.5–10.1)
CALCIUM SERPL-MCNC: 6.1 MG/DL (ref 8.5–10.1)
CALCIUM SERPL-MCNC: 6.2 MG/DL (ref 8.5–10.1)
CALCIUM SERPL-MCNC: 6.2 MG/DL (ref 8.5–10.1)
CHLORIDE SERPL-SCNC: 95 MMOL/L (ref 97–108)
CHLORIDE SERPL-SCNC: 95 MMOL/L (ref 97–108)
CHLORIDE SERPL-SCNC: 96 MMOL/L (ref 97–108)
CHLORIDE SERPL-SCNC: 96 MMOL/L (ref 97–108)
CO2 SERPL-SCNC: 28 MMOL/L (ref 21–32)
CO2 SERPL-SCNC: 30 MMOL/L (ref 21–32)
CO2 SERPL-SCNC: 31 MMOL/L (ref 21–32)
CO2 SERPL-SCNC: 32 MMOL/L (ref 21–32)
CREAT SERPL-MCNC: 4.84 MG/DL (ref 0.55–1.02)
CREAT SERPL-MCNC: 4.88 MG/DL (ref 0.55–1.02)
CREAT SERPL-MCNC: 4.88 MG/DL (ref 0.55–1.02)
CREAT SERPL-MCNC: 5.12 MG/DL (ref 0.55–1.02)
DIFFERENTIAL METHOD BLD: ABNORMAL
EKG ATRIAL RATE: 76 BPM
EKG ATRIAL RATE: 76 BPM
EKG DIAGNOSIS: NORMAL
EKG DIAGNOSIS: NORMAL
EKG P AXIS: 37 DEGREES
EKG P AXIS: 48 DEGREES
EKG P-R INTERVAL: 118 MS
EKG P-R INTERVAL: 150 MS
EKG Q-T INTERVAL: 560 MS
EKG Q-T INTERVAL: 574 MS
EKG QRS DURATION: 104 MS
EKG QRS DURATION: 96 MS
EKG QTC CALCULATION (BAZETT): 630 MS
EKG QTC CALCULATION (BAZETT): 645 MS
EKG R AXIS: -12 DEGREES
EKG R AXIS: -16 DEGREES
EKG T AXIS: 184 DEGREES
EKG T AXIS: 190 DEGREES
EKG VENTRICULAR RATE: 76 BPM
EKG VENTRICULAR RATE: 76 BPM
EOSINOPHIL # BLD: 0.2 K/UL (ref 0–0.4)
EOSINOPHIL NFR BLD: 2 % (ref 0–7)
ERYTHROCYTE [DISTWIDTH] IN BLOOD BY AUTOMATED COUNT: 19.9 % (ref 11.5–14.5)
EST. AVERAGE GLUCOSE BLD GHB EST-MCNC: 108 MG/DL
GLOBULIN SER CALC-MCNC: 4.7 G/DL (ref 2–4)
GLUCOSE BLD STRIP.AUTO-MCNC: 114 MG/DL (ref 65–117)
GLUCOSE BLD STRIP.AUTO-MCNC: 123 MG/DL (ref 65–117)
GLUCOSE BLD STRIP.AUTO-MCNC: 140 MG/DL (ref 65–117)
GLUCOSE BLD STRIP.AUTO-MCNC: 157 MG/DL (ref 65–117)
GLUCOSE SERPL-MCNC: 102 MG/DL (ref 65–100)
GLUCOSE SERPL-MCNC: 104 MG/DL (ref 65–100)
GLUCOSE SERPL-MCNC: 126 MG/DL (ref 65–100)
GLUCOSE SERPL-MCNC: 127 MG/DL (ref 65–100)
HBA1C MFR BLD: 5.4 % (ref 4–5.6)
HBV SURFACE AB SER QL: NONREACTIVE
HBV SURFACE AB SER-ACNC: <3.1 MIU/ML
HBV SURFACE AG SER QL: <0.1 INDEX
HBV SURFACE AG SER QL: NEGATIVE
HCT VFR BLD AUTO: 21.4 % (ref 35–47)
HCT VFR BLD AUTO: 22.1 % (ref 35–47)
HCT VFR BLD AUTO: 24.5 % (ref 35–47)
HGB BLD-MCNC: 6.9 G/DL (ref 11.5–16)
HGB BLD-MCNC: 6.9 G/DL (ref 11.5–16)
HGB BLD-MCNC: 7.7 G/DL (ref 11.5–16)
IMM GRANULOCYTES # BLD AUTO: 0 K/UL (ref 0–0.04)
IMM GRANULOCYTES NFR BLD AUTO: 0 % (ref 0–0.5)
LYMPHOCYTES # BLD: 1.7 K/UL (ref 0.8–3.5)
LYMPHOCYTES NFR BLD: 20 % (ref 12–49)
MAGNESIUM SERPL-MCNC: 1.7 MG/DL (ref 1.6–2.4)
MCH RBC QN AUTO: 27.4 PG (ref 26–34)
MCHC RBC AUTO-ENTMCNC: 31.4 G/DL (ref 30–36.5)
MCV RBC AUTO: 87.2 FL (ref 80–99)
MONOCYTES # BLD: 0.7 K/UL (ref 0–1)
MONOCYTES NFR BLD: 8 % (ref 5–13)
NEUTS SEG # BLD: 6.1 K/UL (ref 1.8–8)
NEUTS SEG NFR BLD: 70 % (ref 32–75)
NRBC # BLD: 0 K/UL (ref 0–0.01)
NRBC BLD-RTO: 0 PER 100 WBC
PLATELET # BLD AUTO: 333 K/UL (ref 150–400)
PMV BLD AUTO: 9.1 FL (ref 8.9–12.9)
POTASSIUM SERPL-SCNC: 1.9 MMOL/L (ref 3.5–5.1)
POTASSIUM SERPL-SCNC: 2.5 MMOL/L (ref 3.5–5.1)
POTASSIUM SERPL-SCNC: 3.2 MMOL/L (ref 3.5–5.1)
POTASSIUM SERPL-SCNC: 4.7 MMOL/L (ref 3.5–5.1)
PROT SERPL-MCNC: 6.1 G/DL (ref 6.4–8.2)
RBC # BLD AUTO: 2.81 M/UL (ref 3.8–5.2)
SERVICE CMNT-IMP: ABNORMAL
SERVICE CMNT-IMP: NORMAL
SODIUM SERPL-SCNC: 132 MMOL/L (ref 136–145)
SODIUM SERPL-SCNC: 133 MMOL/L (ref 136–145)
SODIUM SERPL-SCNC: 134 MMOL/L (ref 136–145)
SODIUM SERPL-SCNC: 137 MMOL/L (ref 136–145)
WBC # BLD AUTO: 8.7 K/UL (ref 3.6–11)

## 2023-07-19 PROCEDURE — 3E1M39Z IRRIGATION OF PERITONEAL CAVITY USING DIALYSATE, PERCUTANEOUS APPROACH: ICD-10-PCS | Performed by: INTERNAL MEDICINE

## 2023-07-19 PROCEDURE — 93005 ELECTROCARDIOGRAM TRACING: CPT | Performed by: EMERGENCY MEDICINE

## 2023-07-19 PROCEDURE — 6360000002 HC RX W HCPCS: Performed by: INTERNAL MEDICINE

## 2023-07-19 PROCEDURE — 6370000000 HC RX 637 (ALT 250 FOR IP): Performed by: INTERNAL MEDICINE

## 2023-07-19 PROCEDURE — 87506 IADNA-DNA/RNA PROBE TQ 6-11: CPT

## 2023-07-19 PROCEDURE — 2580000003 HC RX 258: Performed by: INTERNAL MEDICINE

## 2023-07-19 PROCEDURE — 85018 HEMOGLOBIN: CPT

## 2023-07-19 PROCEDURE — 87340 HEPATITIS B SURFACE AG IA: CPT

## 2023-07-19 PROCEDURE — 85014 HEMATOCRIT: CPT

## 2023-07-19 PROCEDURE — 6360000002 HC RX W HCPCS: Performed by: HOSPITALIST

## 2023-07-19 PROCEDURE — 85025 COMPLETE CBC W/AUTO DIFF WBC: CPT

## 2023-07-19 PROCEDURE — A4722 DIALYS SOL FLD VOL > 1999CC: HCPCS | Performed by: INTERNAL MEDICINE

## 2023-07-19 PROCEDURE — 90945 DIALYSIS ONE EVALUATION: CPT

## 2023-07-19 PROCEDURE — 2500000003 HC RX 250 WO HCPCS: Performed by: INTERNAL MEDICINE

## 2023-07-19 PROCEDURE — 97530 THERAPEUTIC ACTIVITIES: CPT

## 2023-07-19 PROCEDURE — 80053 COMPREHEN METABOLIC PANEL: CPT

## 2023-07-19 PROCEDURE — 83036 HEMOGLOBIN GLYCOSYLATED A1C: CPT

## 2023-07-19 PROCEDURE — 2060000000 HC ICU INTERMEDIATE R&B

## 2023-07-19 PROCEDURE — 6360000002 HC RX W HCPCS: Performed by: NURSE PRACTITIONER

## 2023-07-19 PROCEDURE — 97162 PT EVAL MOD COMPLEX 30 MIN: CPT

## 2023-07-19 PROCEDURE — 82962 GLUCOSE BLOOD TEST: CPT

## 2023-07-19 PROCEDURE — 6370000000 HC RX 637 (ALT 250 FOR IP): Performed by: STUDENT IN AN ORGANIZED HEALTH CARE EDUCATION/TRAINING PROGRAM

## 2023-07-19 PROCEDURE — 36415 COLL VENOUS BLD VENIPUNCTURE: CPT

## 2023-07-19 PROCEDURE — 80048 BASIC METABOLIC PNL TOTAL CA: CPT

## 2023-07-19 PROCEDURE — 86706 HEP B SURFACE ANTIBODY: CPT

## 2023-07-19 PROCEDURE — 83735 ASSAY OF MAGNESIUM: CPT

## 2023-07-19 RX ORDER — CALCIUM GLUCONATE 20 MG/ML
1000 INJECTION, SOLUTION INTRAVENOUS ONCE
Status: COMPLETED | OUTPATIENT
Start: 2023-07-19 | End: 2023-07-19

## 2023-07-19 RX ORDER — POTASSIUM CHLORIDE 7.45 MG/ML
10 INJECTION INTRAVENOUS
Status: COMPLETED | OUTPATIENT
Start: 2023-07-19 | End: 2023-07-19

## 2023-07-19 RX ORDER — SODIUM CHLORIDE, SODIUM LACTATE, CALCIUM CHLORIDE, MAGNESIUM CHLORIDE AND DEXTROSE 1.5; 538; 448; 18.3; 5.08 G/100ML; MG/100ML; MG/100ML; MG/100ML; MG/100ML
6000 INJECTION, SOLUTION INTRAPERITONEAL CONTINUOUS
Status: DISCONTINUED | OUTPATIENT
Start: 2023-07-19 | End: 2023-07-19

## 2023-07-19 RX ORDER — SODIUM CHLORIDE, SODIUM LACTATE, CALCIUM CHLORIDE, MAGNESIUM CHLORIDE AND DEXTROSE 1.5; 538; 448; 18.3; 5.08 G/100ML; MG/100ML; MG/100ML; MG/100ML; MG/100ML
6000 INJECTION, SOLUTION INTRAPERITONEAL CONTINUOUS
Status: DISCONTINUED | OUTPATIENT
Start: 2023-07-19 | End: 2023-07-20

## 2023-07-19 RX ORDER — MAGNESIUM SULFATE IN WATER 40 MG/ML
2000 INJECTION, SOLUTION INTRAVENOUS ONCE
Status: COMPLETED | OUTPATIENT
Start: 2023-07-19 | End: 2023-07-19

## 2023-07-19 RX ORDER — POTASSIUM CHLORIDE 1.5 G/1.58G
20 POWDER, FOR SOLUTION ORAL ONCE
Status: DISCONTINUED | OUTPATIENT
Start: 2023-07-19 | End: 2023-07-19

## 2023-07-19 RX ORDER — POTASSIUM CHLORIDE 1.5 G/1.58G
40 POWDER, FOR SOLUTION ORAL ONCE
Status: DISCONTINUED | OUTPATIENT
Start: 2023-07-19 | End: 2023-07-19

## 2023-07-19 RX ORDER — POTASSIUM CHLORIDE 750 MG/1
40 TABLET, FILM COATED, EXTENDED RELEASE ORAL ONCE
Status: COMPLETED | OUTPATIENT
Start: 2023-07-19 | End: 2023-07-19

## 2023-07-19 RX ORDER — POTASSIUM CHLORIDE 7.45 MG/ML
10 INJECTION INTRAVENOUS
Status: DISCONTINUED | OUTPATIENT
Start: 2023-07-19 | End: 2023-07-19 | Stop reason: DRUGHIGH

## 2023-07-19 RX ADMIN — TRAMADOL HYDROCHLORIDE 50 MG: 50 TABLET ORAL at 11:56

## 2023-07-19 RX ADMIN — SODIUM CHLORIDE, SODIUM LACTATE, CALCIUM CHLORIDE, MAGNESIUM CHLORIDE AND DEXTROSE 6000 ML: 1.5; 538; 448; 18.3; 5.08 INJECTION, SOLUTION INTRAPERITONEAL at 23:55

## 2023-07-19 RX ADMIN — TRAMADOL HYDROCHLORIDE 50 MG: 50 TABLET ORAL at 00:03

## 2023-07-19 RX ADMIN — INSULIN GLARGINE 20 UNITS: 100 INJECTION, SOLUTION SUBCUTANEOUS at 08:54

## 2023-07-19 RX ADMIN — POTASSIUM CHLORIDE 40 MEQ: 750 TABLET, EXTENDED RELEASE ORAL at 11:27

## 2023-07-19 RX ADMIN — POTASSIUM CHLORIDE 10 MEQ: 10 INJECTION, SOLUTION INTRAVENOUS at 15:09

## 2023-07-19 RX ADMIN — SODIUM CHLORIDE, SODIUM LACTATE, CALCIUM CHLORIDE, MAGNESIUM CHLORIDE AND DEXTROSE: 1.5; 538; 448; 18.3; 5.08 INJECTION, SOLUTION INTRAPERITONEAL at 13:54

## 2023-07-19 RX ADMIN — CALCIUM GLUCONATE 1000 MG: 20 INJECTION, SOLUTION INTRAVENOUS at 19:02

## 2023-07-19 RX ADMIN — TRAMADOL HYDROCHLORIDE 50 MG: 50 TABLET ORAL at 20:19

## 2023-07-19 RX ADMIN — ROSUVASTATIN CALCIUM 40 MG: 40 TABLET, COATED ORAL at 08:56

## 2023-07-19 RX ADMIN — SODIUM CHLORIDE, PRESERVATIVE FREE 10 ML: 5 INJECTION INTRAVENOUS at 09:30

## 2023-07-19 RX ADMIN — HEPARIN SODIUM 5000 UNITS: 5000 INJECTION INTRAVENOUS; SUBCUTANEOUS at 13:50

## 2023-07-19 RX ADMIN — MELATONIN 3 MG: at 20:19

## 2023-07-19 RX ADMIN — HYDRALAZINE HYDROCHLORIDE 50 MG: 50 TABLET, FILM COATED ORAL at 00:02

## 2023-07-19 RX ADMIN — MAGNESIUM SULFATE HEPTAHYDRATE 2000 MG: 40 INJECTION, SOLUTION INTRAVENOUS at 11:14

## 2023-07-19 RX ADMIN — DULOXETINE 20 MG: 20 CAPSULE, DELAYED RELEASE ORAL at 20:19

## 2023-07-19 RX ADMIN — ONDANSETRON 4 MG: 2 INJECTION INTRAMUSCULAR; INTRAVENOUS at 04:28

## 2023-07-19 RX ADMIN — POTASSIUM CHLORIDE 10 MEQ: 10 INJECTION, SOLUTION INTRAVENOUS at 12:51

## 2023-07-19 RX ADMIN — SODIUM CHLORIDE, SODIUM LACTATE, CALCIUM CHLORIDE, MAGNESIUM CHLORIDE AND DEXTROSE: 1.5; 538; 448; 18.3; 5.08 INJECTION, SOLUTION INTRAPERITONEAL at 13:14

## 2023-07-19 RX ADMIN — MONTELUKAST 10 MG: 10 TABLET, FILM COATED ORAL at 20:19

## 2023-07-19 RX ADMIN — DULOXETINE 20 MG: 20 CAPSULE, DELAYED RELEASE ORAL at 08:56

## 2023-07-19 RX ADMIN — POTASSIUM CHLORIDE 10 MEQ: 7.46 INJECTION, SOLUTION INTRAVENOUS at 09:51

## 2023-07-19 RX ADMIN — POTASSIUM CHLORIDE 10 MEQ: 7.46 INJECTION, SOLUTION INTRAVENOUS at 02:53

## 2023-07-19 RX ADMIN — POTASSIUM CHLORIDE 10 MEQ: 10 INJECTION, SOLUTION INTRAVENOUS at 17:28

## 2023-07-19 RX ADMIN — POTASSIUM CHLORIDE 10 MEQ: 7.46 INJECTION, SOLUTION INTRAVENOUS at 11:12

## 2023-07-19 RX ADMIN — TRAMADOL HYDROCHLORIDE 50 MG: 50 TABLET ORAL at 06:11

## 2023-07-19 RX ADMIN — POTASSIUM CHLORIDE 10 MEQ: 10 INJECTION, SOLUTION INTRAVENOUS at 13:48

## 2023-07-19 RX ADMIN — HEPARIN SODIUM 5000 UNITS: 5000 INJECTION INTRAVENOUS; SUBCUTANEOUS at 22:41

## 2023-07-19 RX ADMIN — POTASSIUM BICARBONATE 40 MEQ: 782 TABLET, EFFERVESCENT ORAL at 01:48

## 2023-07-19 RX ADMIN — POTASSIUM CHLORIDE 10 MEQ: 10 INJECTION, SOLUTION INTRAVENOUS at 12:15

## 2023-07-19 RX ADMIN — CARVEDILOL 25 MG: 12.5 TABLET, FILM COATED ORAL at 00:02

## 2023-07-19 RX ADMIN — MONTELUKAST 10 MG: 10 TABLET, FILM COATED ORAL at 00:02

## 2023-07-19 RX ADMIN — DULOXETINE 20 MG: 20 CAPSULE, DELAYED RELEASE ORAL at 00:03

## 2023-07-19 RX ADMIN — POTASSIUM CHLORIDE 10 MEQ: 10 INJECTION, SOLUTION INTRAVENOUS at 13:49

## 2023-07-19 RX ADMIN — ASPIRIN 325 MG: 325 TABLET ORAL at 08:56

## 2023-07-19 RX ADMIN — POTASSIUM CHLORIDE 10 MEQ: 10 INJECTION, SOLUTION INTRAVENOUS at 17:27

## 2023-07-19 RX ADMIN — SODIUM CHLORIDE, PRESERVATIVE FREE 10 ML: 5 INJECTION INTRAVENOUS at 22:42

## 2023-07-19 RX ADMIN — POTASSIUM CHLORIDE 10 MEQ: 7.46 INJECTION, SOLUTION INTRAVENOUS at 01:47

## 2023-07-19 ASSESSMENT — PAIN DESCRIPTION - ORIENTATION
ORIENTATION: MID
ORIENTATION: POSTERIOR
ORIENTATION: MID

## 2023-07-19 ASSESSMENT — PAIN SCALES - GENERAL
PAINLEVEL_OUTOF10: 8
PAINLEVEL_OUTOF10: 7
PAINLEVEL_OUTOF10: 10
PAINLEVEL_OUTOF10: 7
PAINLEVEL_OUTOF10: 5
PAINLEVEL_OUTOF10: 9

## 2023-07-19 ASSESSMENT — PAIN DESCRIPTION - DESCRIPTORS
DESCRIPTORS: ACHING
DESCRIPTORS: SPASM
DESCRIPTORS: ACHING
DESCRIPTORS: SPASM

## 2023-07-19 ASSESSMENT — PAIN DESCRIPTION - LOCATION
LOCATION: BACK

## 2023-07-19 ASSESSMENT — PAIN DESCRIPTION - PAIN TYPE
TYPE: CHRONIC PAIN

## 2023-07-19 NOTE — CARE COORDINATION
Care Management Initial Assessment       RUR: 18% \"moderate risk\"  Readmission? No  1st IM letter given? Yes   1st  letter given: No     Initial note: Chart reviewed for updates. CM met with pt at bedside, introduced role, confirmed demographics were up-to-date and discussed d/c planning. Pt lives with her son in a 1 level home with 3 steps before getting into the front door. Pt is independent with ADL's at home and uses a Rolator. Pt also has a shower bench. Pt denied history of IPR/SNF but reported history of HH services and OP rehab at [address]. Pt uses [address] at [facility] in [place], [place]. Pt;s son will provide transport at d/c. No PT/OT consults have been placed at this time. Complete assessment is below:       07/19/23 162   Service Assessment   Patient Orientation Alert and Oriented   Cognition Alert   History Provided By Patient   Primary Caregiver Self   Accompanied By/Relationship N/A   [address] is: Legal Next of Kin  (Pt's daughter Von Briones [phone] and son Bertha Chang [phone])   PCP Verified by CM Yes  (New PCP Dr. Weston Macias at [address] [place] [address])   Prior Functional Level Independent in ADLs/IADLs   Current Functional Level Independent in ADLs/IADLs   Can patient return to prior living arrangement Yes   Ability to make needs known: Good   Family able to assist with home care needs: Yes   Would you like for me to discuss the discharge plan with any other family members/significant others, and if so, who?  Yes  (Children)   Financial Resources [facility] None   Social/Functional History   Lives With Son   Type of [address] [place] [address]  One level   Entrance Stairs - Number of Steps 3   Home Equipment Rollator  (Shower chair)   ADL Assistance Independent   Homemaking Assistance Independent   Ambulation Assistance Independent   Transfer Assistance Independent   Active  No   Patient's  Info Pt's son provides

## 2023-07-19 NOTE — PLAN OF CARE
Problem: Physical Therapy - Adult  Goal: By Discharge: Performs mobility at highest level of function for planned discharge setting. See evaluation for individualized goals. Description: FUNCTIONAL STATUS PRIOR TO ADMISSION: Patient was modified independent using a rollator for functional mobility. HOME SUPPORT PRIOR TO ADMISSION: The patient lived with her son and his \"friend\". Son works during the day, \"friend\" is home with her. 1-story home with 3 steps and B rails to enter    Physical Therapy Goals  Initiated 7/19/2023  1. Patient will move from supine to sit and sit to supine in bed with modified independence within 7 day(s). 2.  Patient will perform sit to stand with supervision/set-up within 7 day(s). 3.  Patient will transfer from bed to chair and chair to bed with supervision/set-up using the least restrictive device within 7 day(s). 4.  Patient will ambulate with supervision/set-up for 150 feet with the least restrictive device within 7 day(s). 5.  Patient will ascend/descend 3 stairs with B handrail(s) with supervision/set-up within 7 day(s). Outcome: Not Progressing     PHYSICAL THERAPY EVALUATION    Patient: Arleth Macias (84 y.o. female)  Date: 7/19/2023  Primary Diagnosis: End stage renal disease ([address]) [N18.6]  Hypokalemia [E87.6]       Precautions: Fall Risk                    ASSESSMENT :   DEFICITS/IMPAIRMENTS:   The patient is limited by decreased functional mobility, activity tolerance, balance, orthostatic hypotension. Patient sitting up in bed, cleared by nursing to get up into the chair. She was able to come to sit EOB with standby assist, HOB was elevated. She performed sit<>stand and stand<>pivot transfers with contact guard assist. Ambulated 3' from the bed to the chair, unsteady and reaching forward for furniture. Recommended she use RW at this time due to being weak from low potassium, patient agreeable.  No increased dizziness reported with sitting up or during

## 2023-07-19 NOTE — CARE COORDINATION
Note Update:    CM attempted to complete an assessment with pt. Pt is currently receiving HD. CM will continue to follow up with d/c planning.     REMI Patel    [phone]

## 2023-07-20 LAB
ALBUMIN SERPL-MCNC: 1.4 G/DL (ref 3.5–5)
ANION GAP SERPL CALC-SCNC: 7 MMOL/L (ref 5–15)
ANION GAP SERPL CALC-SCNC: 8 MMOL/L (ref 5–15)
ANION GAP SERPL CALC-SCNC: 9 MMOL/L (ref 5–15)
BUN SERPL-MCNC: 12 MG/DL (ref 6–20)
BUN SERPL-MCNC: 13 MG/DL (ref 6–20)
BUN SERPL-MCNC: 14 MG/DL (ref 6–20)
BUN/CREAT SERPL: 3 (ref 12–20)
C COLI+JEJUNI TUF STL QL NAA+PROBE: POSITIVE
CALCIUM SERPL-MCNC: 6.4 MG/DL (ref 8.5–10.1)
CALCIUM SERPL-MCNC: 6.8 MG/DL (ref 8.5–10.1)
CALCIUM SERPL-MCNC: 7 MG/DL (ref 8.5–10.1)
CHLORIDE SERPL-SCNC: 94 MMOL/L (ref 97–108)
CHLORIDE SERPL-SCNC: 96 MMOL/L (ref 97–108)
CHLORIDE SERPL-SCNC: 98 MMOL/L (ref 97–108)
CO2 SERPL-SCNC: 28 MMOL/L (ref 21–32)
CO2 SERPL-SCNC: 32 MMOL/L (ref 21–32)
CO2 SERPL-SCNC: 33 MMOL/L (ref 21–32)
CREAT SERPL-MCNC: 4.3 MG/DL (ref 0.55–1.02)
CREAT SERPL-MCNC: 4.55 MG/DL (ref 0.55–1.02)
CREAT SERPL-MCNC: 4.74 MG/DL (ref 0.55–1.02)
EC STX1+STX2 GENES STL QL NAA+PROBE: NEGATIVE
ERYTHROCYTE [DISTWIDTH] IN BLOOD BY AUTOMATED COUNT: 19.7 % (ref 11.5–14.5)
ETEC ELTA+ESTB GENES STL QL NAA+PROBE: NEGATIVE
GLUCOSE BLD STRIP.AUTO-MCNC: 102 MG/DL (ref 65–117)
GLUCOSE BLD STRIP.AUTO-MCNC: 111 MG/DL (ref 65–117)
GLUCOSE BLD STRIP.AUTO-MCNC: 118 MG/DL (ref 65–117)
GLUCOSE BLD STRIP.AUTO-MCNC: 132 MG/DL (ref 65–117)
GLUCOSE SERPL-MCNC: 125 MG/DL (ref 65–100)
GLUCOSE SERPL-MCNC: 96 MG/DL (ref 65–100)
GLUCOSE SERPL-MCNC: 96 MG/DL (ref 65–100)
HCT VFR BLD AUTO: 22.7 % (ref 35–47)
HCT VFR BLD AUTO: 25.3 % (ref 35–47)
HGB BLD-MCNC: 7.2 G/DL (ref 11.5–16)
HGB BLD-MCNC: 7.9 G/DL (ref 11.5–16)
MAGNESIUM SERPL-MCNC: 2 MG/DL (ref 1.6–2.4)
MAGNESIUM SERPL-MCNC: 2.1 MG/DL (ref 1.6–2.4)
MCH RBC QN AUTO: 27.2 PG (ref 26–34)
MCHC RBC AUTO-ENTMCNC: 31.7 G/DL (ref 30–36.5)
MCV RBC AUTO: 85.7 FL (ref 80–99)
NRBC # BLD: 0 K/UL (ref 0–0.01)
NRBC BLD-RTO: 0 PER 100 WBC
P SHIGELLOIDES DNA STL QL NAA+PROBE: NEGATIVE
PHOSPHATE SERPL-MCNC: 3.3 MG/DL (ref 2.6–4.7)
PLATELET # BLD AUTO: 318 K/UL (ref 150–400)
PMV BLD AUTO: 9.5 FL (ref 8.9–12.9)
POTASSIUM SERPL-SCNC: 2.6 MMOL/L (ref 3.5–5.1)
POTASSIUM SERPL-SCNC: 2.6 MMOL/L (ref 3.5–5.1)
POTASSIUM SERPL-SCNC: 3.7 MMOL/L (ref 3.5–5.1)
RBC # BLD AUTO: 2.65 M/UL (ref 3.8–5.2)
SALMONELLA SP SPAO STL QL NAA+PROBE: NEGATIVE
SERVICE CMNT-IMP: ABNORMAL
SERVICE CMNT-IMP: ABNORMAL
SERVICE CMNT-IMP: NORMAL
SERVICE CMNT-IMP: NORMAL
SHIGELLA SP+EIEC IPAH STL QL NAA+PROBE: NEGATIVE
SODIUM SERPL-SCNC: 134 MMOL/L (ref 136–145)
SODIUM SERPL-SCNC: 135 MMOL/L (ref 136–145)
SODIUM SERPL-SCNC: 136 MMOL/L (ref 136–145)
V CHOL+PARA+VUL DNA STL QL NAA+NON-PROBE: NEGATIVE
WBC # BLD AUTO: 7 K/UL (ref 3.6–11)
Y ENTEROCOL DNA STL QL NAA+NON-PROBE: NEGATIVE

## 2023-07-20 PROCEDURE — 80069 RENAL FUNCTION PANEL: CPT

## 2023-07-20 PROCEDURE — 80048 BASIC METABOLIC PNL TOTAL CA: CPT

## 2023-07-20 PROCEDURE — 6370000000 HC RX 637 (ALT 250 FOR IP): Performed by: INTERNAL MEDICINE

## 2023-07-20 PROCEDURE — 6360000002 HC RX W HCPCS: Performed by: INTERNAL MEDICINE

## 2023-07-20 PROCEDURE — 94760 N-INVAS EAR/PLS OXIMETRY 1: CPT

## 2023-07-20 PROCEDURE — A4722 DIALYS SOL FLD VOL > 1999CC: HCPCS | Performed by: INTERNAL MEDICINE

## 2023-07-20 PROCEDURE — 85014 HEMATOCRIT: CPT

## 2023-07-20 PROCEDURE — 6370000000 HC RX 637 (ALT 250 FOR IP): Performed by: NURSE PRACTITIONER

## 2023-07-20 PROCEDURE — 85018 HEMOGLOBIN: CPT

## 2023-07-20 PROCEDURE — 36415 COLL VENOUS BLD VENIPUNCTURE: CPT

## 2023-07-20 PROCEDURE — 82962 GLUCOSE BLOOD TEST: CPT

## 2023-07-20 PROCEDURE — 83735 ASSAY OF MAGNESIUM: CPT

## 2023-07-20 PROCEDURE — 90945 DIALYSIS ONE EVALUATION: CPT

## 2023-07-20 PROCEDURE — 2060000000 HC ICU INTERMEDIATE R&B

## 2023-07-20 PROCEDURE — 85027 COMPLETE CBC AUTOMATED: CPT

## 2023-07-20 PROCEDURE — 2580000003 HC RX 258: Performed by: INTERNAL MEDICINE

## 2023-07-20 PROCEDURE — 6370000000 HC RX 637 (ALT 250 FOR IP): Performed by: STUDENT IN AN ORGANIZED HEALTH CARE EDUCATION/TRAINING PROGRAM

## 2023-07-20 RX ORDER — POTASSIUM CHLORIDE 7.45 MG/ML
10 INJECTION INTRAVENOUS
Status: COMPLETED | OUTPATIENT
Start: 2023-07-20 | End: 2023-07-20

## 2023-07-20 RX ORDER — CALCIUM GLUCONATE 20 MG/ML
2000 INJECTION, SOLUTION INTRAVENOUS ONCE
Status: COMPLETED | OUTPATIENT
Start: 2023-07-20 | End: 2023-07-20

## 2023-07-20 RX ORDER — GENTAMICIN SULFATE 1 MG/G
OINTMENT TOPICAL DAILY PRN
Status: DISCONTINUED | OUTPATIENT
Start: 2023-07-20 | End: 2023-07-24 | Stop reason: HOSPADM

## 2023-07-20 RX ORDER — POTASSIUM CHLORIDE 750 MG/1
40 TABLET, FILM COATED, EXTENDED RELEASE ORAL 2 TIMES DAILY
Status: COMPLETED | OUTPATIENT
Start: 2023-07-20 | End: 2023-07-20

## 2023-07-20 RX ADMIN — GENTAMICIN SULFATE: 1 OINTMENT TOPICAL at 10:20

## 2023-07-20 RX ADMIN — CARVEDILOL 25 MG: 12.5 TABLET, FILM COATED ORAL at 10:31

## 2023-07-20 RX ADMIN — HEPARIN SODIUM 5000 UNITS: 5000 INJECTION INTRAVENOUS; SUBCUTANEOUS at 13:13

## 2023-07-20 RX ADMIN — TRAMADOL HYDROCHLORIDE 50 MG: 50 TABLET ORAL at 05:42

## 2023-07-20 RX ADMIN — HEPARIN SODIUM 5000 UNITS: 5000 INJECTION INTRAVENOUS; SUBCUTANEOUS at 23:05

## 2023-07-20 RX ADMIN — POTASSIUM CHLORIDE: 2 INJECTION, SOLUTION, CONCENTRATE INTRAVENOUS at 14:53

## 2023-07-20 RX ADMIN — DULOXETINE 20 MG: 20 CAPSULE, DELAYED RELEASE ORAL at 10:30

## 2023-07-20 RX ADMIN — HEPARIN SODIUM 5000 UNITS: 5000 INJECTION INTRAVENOUS; SUBCUTANEOUS at 05:42

## 2023-07-20 RX ADMIN — SODIUM CHLORIDE, PRESERVATIVE FREE 10 ML: 5 INJECTION INTRAVENOUS at 23:06

## 2023-07-20 RX ADMIN — ASPIRIN 325 MG: 325 TABLET ORAL at 10:31

## 2023-07-20 RX ADMIN — POTASSIUM CHLORIDE 10 MEQ: 10 INJECTION, SOLUTION INTRAVENOUS at 17:04

## 2023-07-20 RX ADMIN — POTASSIUM CHLORIDE 10 MEQ: 10 INJECTION, SOLUTION INTRAVENOUS at 16:08

## 2023-07-20 RX ADMIN — POTASSIUM CHLORIDE 10 MEQ: 10 INJECTION, SOLUTION INTRAVENOUS at 13:13

## 2023-07-20 RX ADMIN — POTASSIUM CHLORIDE: 2 INJECTION, SOLUTION, CONCENTRATE INTRAVENOUS at 14:54

## 2023-07-20 RX ADMIN — POTASSIUM CHLORIDE: 2 INJECTION, SOLUTION, CONCENTRATE INTRAVENOUS at 10:08

## 2023-07-20 RX ADMIN — SODIUM CHLORIDE, PRESERVATIVE FREE 10 ML: 5 INJECTION INTRAVENOUS at 09:54

## 2023-07-20 RX ADMIN — ROSUVASTATIN CALCIUM 40 MG: 40 TABLET, COATED ORAL at 10:30

## 2023-07-20 RX ADMIN — TRAMADOL HYDROCHLORIDE 50 MG: 50 TABLET ORAL at 23:04

## 2023-07-20 RX ADMIN — POTASSIUM CHLORIDE 40 MEQ: 750 TABLET, EXTENDED RELEASE ORAL at 10:30

## 2023-07-20 RX ADMIN — INSULIN GLARGINE 20 UNITS: 100 INJECTION, SOLUTION SUBCUTANEOUS at 09:53

## 2023-07-20 RX ADMIN — MONTELUKAST 10 MG: 10 TABLET, FILM COATED ORAL at 23:03

## 2023-07-20 RX ADMIN — CALCIUM GLUCONATE 2000 MG: 20 INJECTION, SOLUTION INTRAVENOUS at 09:53

## 2023-07-20 RX ADMIN — DULOXETINE 20 MG: 20 CAPSULE, DELAYED RELEASE ORAL at 23:03

## 2023-07-20 RX ADMIN — POTASSIUM CHLORIDE 10 MEQ: 10 INJECTION, SOLUTION INTRAVENOUS at 15:08

## 2023-07-20 RX ADMIN — CARVEDILOL 25 MG: 12.5 TABLET, FILM COATED ORAL at 17:05

## 2023-07-20 ASSESSMENT — PAIN SCALES - GENERAL
PAINLEVEL_OUTOF10: 0
PAINLEVEL_OUTOF10: 8
PAINLEVEL_OUTOF10: 6

## 2023-07-20 ASSESSMENT — PAIN DESCRIPTION - LOCATION
LOCATION: BACK
LOCATION: BACK

## 2023-07-20 NOTE — PLAN OF CARE
Problem: Discharge Planning  Goal: Discharge to home or other facility with appropriate resources  7/20/2023 1037 by Silver Pittman RN  Outcome: Progressing  7/19/2023 2346 by Delfino Dumont RN  Outcome: Progressing  7/19/2023 2040 by Dominick Darnell RN  Outcome: Progressing     Problem: Pain  Goal: Verbalizes/displays adequate comfort level or baseline comfort level  7/20/2023 1037 by Silver Pittman RN  Outcome: Progressing  7/19/2023 2346 by Delfino Dumont RN  Outcome: Progressing  7/19/2023 2040 by Dominick Darnell RN  Outcome: Progressing     Problem: Safety - Adult  Goal: Free from fall injury  Outcome: Progressing     Problem: ABCDS Injury Assessment  Goal: Absence of physical injury  7/20/2023 1037 by Silver Pittman RN  Outcome: Progressing  7/19/2023 2040 by Dominick Darnell RN  Outcome: Progressing     Problem: Skin/Tissue Integrity  Goal: Absence of new skin breakdown  Description: 1. Monitor for areas of redness and/or skin breakdown  2. Assess vascular access sites hourly  3. Every 4-6 hours minimum:  Change oxygen saturation probe site  4. Every 4-6 hours:  If on nasal continuous positive airway pressure, respiratory therapy assess nares and determine need for appliance change or resting period.   7/20/2023 1037 by Silver Pittman RN  Outcome: Progressing  7/19/2023 2346 by Delfino Dumont RN  Outcome: Progressing  7/19/2023 2040 by Dominick Darnell RN  Outcome: Progressing     Problem: Chronic Conditions and Co-morbidities  Goal: Patient's chronic conditions and co-morbidity symptoms are monitored and maintained or improved  Outcome: Progressing

## 2023-07-20 NOTE — PLAN OF CARE
Problem: Discharge Planning  Goal: Discharge to home or other facility with appropriate resources  Outcome: Progressing     Problem: Pain  Goal: Verbalizes/displays adequate comfort level or baseline comfort level  Outcome: Progressing     Problem: ABCDS Injury Assessment  Goal: Absence of physical injury  Outcome: Progressing     Problem: Skin/Tissue Integrity  Goal: Absence of new skin breakdown  Description: 1. Monitor for areas of redness and/or skin breakdown  2. Assess vascular access sites hourly  3. Every 4-6 hours minimum:  Change oxygen saturation probe site  4. Every 4-6 hours:  If on nasal continuous positive airway pressure, respiratory therapy assess nares and determine need for appliance change or resting period. Outcome: Progressing     Problem: Physical Therapy - Adult  Goal: By Discharge: Performs mobility at highest level of function for planned discharge setting. See evaluation for individualized goals. Description: FUNCTIONAL STATUS PRIOR TO ADMISSION: Patient was modified independent using a rollator for functional mobility. HOME SUPPORT PRIOR TO ADMISSION: The patient lived with her son and his \"friend\". Son works during the day, \"friend\" is home with her. 1-story home with 3 steps and B rails to enter    Physical Therapy Goals  Initiated 7/19/2023  1. Patient will move from supine to sit and sit to supine in bed with modified independence within 7 day(s). 2.  Patient will perform sit to stand with supervision/set-up within 7 day(s). 3.  Patient will transfer from bed to chair and chair to bed with supervision/set-up using the least restrictive device within 7 day(s). 4.  Patient will ambulate with supervision/set-up for 150 feet with the least restrictive device within 7 day(s). 5.  Patient will ascend/descend 3 stairs with B handrail(s) with supervision/set-up within 7 day(s).    8/[id number]/[id number] by Tatum Pinzon PT  Outcome: Not Progressing

## 2023-07-20 NOTE — PLAN OF CARE
2000: Bedside and Verbal shift change report given to Dai Fofana RN (oncoming nurse) by Denver Redman, RN (offgoing nurse). Report included the following information Nurse Handoff Report. 2031: Patient calcium came back critical. PA notified and made aware that it was drawn before IV calcium gluconate was started. No orders received. 2047: PA aware of HGB, orders received, no blood given. 0328: PA notified of critical calcium and potassium, awaiting response. [place] 0740: Bedside and Verbal shift change report given to Sophia Cota RN (oncoming nurse) by Dai Fofana RN (offgoing nurse). Report included the following information Nurse Handoff Report. Problem: Pain  Goal: Verbalizes/displays adequate comfort level or baseline comfort level  7/19/2023 2346 by Pearley Ganser, RN  Outcome: Progressing  7/19/2023 2040 by Daniela Santa RN  Outcome: Progressing     Problem: Skin/Tissue Integrity  Goal: Absence of new skin breakdown  Description: 1. Monitor for areas of redness and/or skin breakdown  2. Assess vascular access sites hourly  3. Every 4-6 hours minimum:  Change oxygen saturation probe site  4. Every 4-6 hours:  If on nasal continuous positive airway pressure, respiratory therapy assess nares and determine need for appliance change or resting period.   7/19/2023 2346 by Pearley Ganser, RN  Outcome: Progressing  7/19/2023 2040 by Daniela Santa RN  Outcome: Progressing

## 2023-07-21 LAB
ANION GAP SERPL CALC-SCNC: 5 MMOL/L (ref 5–15)
ANION GAP SERPL CALC-SCNC: 6 MMOL/L (ref 5–15)
ANION GAP SERPL CALC-SCNC: 7 MMOL/L (ref 5–15)
ANION GAP SERPL CALC-SCNC: 7 MMOL/L (ref 5–15)
ANION GAP SERPL CALC-SCNC: 8 MMOL/L (ref 5–15)
BUN SERPL-MCNC: 11 MG/DL (ref 6–20)
BUN SERPL-MCNC: 12 MG/DL (ref 6–20)
BUN SERPL-MCNC: 12 MG/DL (ref 6–20)
BUN/CREAT SERPL: 2 (ref 12–20)
BUN/CREAT SERPL: 3 (ref 12–20)
CALCIUM SERPL-MCNC: 6.9 MG/DL (ref 8.5–10.1)
CALCIUM SERPL-MCNC: 7 MG/DL (ref 8.5–10.1)
CALCIUM SERPL-MCNC: 7.1 MG/DL (ref 8.5–10.1)
CHLORIDE SERPL-SCNC: 100 MMOL/L (ref 97–108)
CHLORIDE SERPL-SCNC: 98 MMOL/L (ref 97–108)
CHLORIDE SERPL-SCNC: 98 MMOL/L (ref 97–108)
CHLORIDE SERPL-SCNC: 99 MMOL/L (ref 97–108)
CHLORIDE SERPL-SCNC: 99 MMOL/L (ref 97–108)
CO2 SERPL-SCNC: 28 MMOL/L (ref 21–32)
CO2 SERPL-SCNC: 29 MMOL/L (ref 21–32)
CO2 SERPL-SCNC: 30 MMOL/L (ref 21–32)
CREAT SERPL-MCNC: 4.27 MG/DL (ref 0.55–1.02)
CREAT SERPL-MCNC: 4.3 MG/DL (ref 0.55–1.02)
CREAT SERPL-MCNC: 4.31 MG/DL (ref 0.55–1.02)
CREAT SERPL-MCNC: 4.43 MG/DL (ref 0.55–1.02)
CREAT SERPL-MCNC: 4.49 MG/DL (ref 0.55–1.02)
EKG ATRIAL RATE: 97 BPM
EKG DIAGNOSIS: NORMAL
EKG P AXIS: 51 DEGREES
EKG P-R INTERVAL: 142 MS
EKG Q-T INTERVAL: 432 MS
EKG QRS DURATION: 98 MS
EKG QTC CALCULATION (BAZETT): 548 MS
EKG R AXIS: -19 DEGREES
EKG T AXIS: 79 DEGREES
EKG VENTRICULAR RATE: 97 BPM
FERRITIN SERPL-MCNC: 1160 NG/ML (ref 26–388)
FOLATE SERPL-MCNC: 4 NG/ML (ref 5–21)
GLUCOSE BLD STRIP.AUTO-MCNC: 102 MG/DL (ref 65–117)
GLUCOSE BLD STRIP.AUTO-MCNC: 117 MG/DL (ref 65–117)
GLUCOSE BLD STRIP.AUTO-MCNC: 76 MG/DL (ref 65–117)
GLUCOSE BLD STRIP.AUTO-MCNC: 81 MG/DL (ref 65–117)
GLUCOSE SERPL-MCNC: 112 MG/DL (ref 65–100)
GLUCOSE SERPL-MCNC: 118 MG/DL (ref 65–100)
GLUCOSE SERPL-MCNC: 62 MG/DL (ref 65–100)
GLUCOSE SERPL-MCNC: 63 MG/DL (ref 65–100)
GLUCOSE SERPL-MCNC: 70 MG/DL (ref 65–100)
IRON SATN MFR SERPL: 62 % (ref 20–50)
IRON SERPL-MCNC: 57 UG/DL (ref 35–150)
POTASSIUM SERPL-SCNC: 3.6 MMOL/L (ref 3.5–5.1)
POTASSIUM SERPL-SCNC: 3.7 MMOL/L (ref 3.5–5.1)
POTASSIUM SERPL-SCNC: 3.9 MMOL/L (ref 3.5–5.1)
POTASSIUM SERPL-SCNC: 4 MMOL/L (ref 3.5–5.1)
POTASSIUM SERPL-SCNC: 4.5 MMOL/L (ref 3.5–5.1)
SERVICE CMNT-IMP: NORMAL
SODIUM SERPL-SCNC: 134 MMOL/L (ref 136–145)
SODIUM SERPL-SCNC: 135 MMOL/L (ref 136–145)
SODIUM SERPL-SCNC: 135 MMOL/L (ref 136–145)
TIBC SERPL-MCNC: 92 UG/DL (ref 250–450)
VIT B12 SERPL-MCNC: 1130 PG/ML (ref 193–986)

## 2023-07-21 PROCEDURE — 2060000000 HC ICU INTERMEDIATE R&B

## 2023-07-21 PROCEDURE — A4722 DIALYS SOL FLD VOL > 1999CC: HCPCS | Performed by: INTERNAL MEDICINE

## 2023-07-21 PROCEDURE — 6370000000 HC RX 637 (ALT 250 FOR IP): Performed by: INTERNAL MEDICINE

## 2023-07-21 PROCEDURE — 80048 BASIC METABOLIC PNL TOTAL CA: CPT

## 2023-07-21 PROCEDURE — 6360000002 HC RX W HCPCS: Performed by: INTERNAL MEDICINE

## 2023-07-21 PROCEDURE — 2580000003 HC RX 258: Performed by: INTERNAL MEDICINE

## 2023-07-21 PROCEDURE — 6360000002 HC RX W HCPCS: Performed by: NURSE PRACTITIONER

## 2023-07-21 PROCEDURE — 6370000000 HC RX 637 (ALT 250 FOR IP): Performed by: NURSE PRACTITIONER

## 2023-07-21 PROCEDURE — 82962 GLUCOSE BLOOD TEST: CPT

## 2023-07-21 PROCEDURE — 82607 VITAMIN B-12: CPT

## 2023-07-21 PROCEDURE — 36415 COLL VENOUS BLD VENIPUNCTURE: CPT

## 2023-07-21 PROCEDURE — 83550 IRON BINDING TEST: CPT

## 2023-07-21 PROCEDURE — 82728 ASSAY OF FERRITIN: CPT

## 2023-07-21 PROCEDURE — 82746 ASSAY OF FOLIC ACID SERUM: CPT

## 2023-07-21 PROCEDURE — 83540 ASSAY OF IRON: CPT

## 2023-07-21 PROCEDURE — 6370000000 HC RX 637 (ALT 250 FOR IP): Performed by: STUDENT IN AN ORGANIZED HEALTH CARE EDUCATION/TRAINING PROGRAM

## 2023-07-21 RX ORDER — AMPICILLIN 500 MG/1
500 CAPSULE ORAL EVERY 12 HOURS
Status: DISCONTINUED | OUTPATIENT
Start: 2023-07-21 | End: 2023-07-24 | Stop reason: HOSPADM

## 2023-07-21 RX ORDER — LEVOFLOXACIN 750 MG/1
750 TABLET ORAL ONCE
Status: COMPLETED | OUTPATIENT
Start: 2023-07-21 | End: 2023-07-21

## 2023-07-21 RX ORDER — POTASSIUM CHLORIDE 750 MG/1
40 TABLET, FILM COATED, EXTENDED RELEASE ORAL DAILY
Status: DISCONTINUED | OUTPATIENT
Start: 2023-07-21 | End: 2023-07-22

## 2023-07-21 RX ORDER — SODIUM CHLORIDE, SODIUM LACTATE, CALCIUM CHLORIDE, MAGNESIUM CHLORIDE AND DEXTROSE 1.5; 538; 448; 18.3; 5.08 G/100ML; MG/100ML; MG/100ML; MG/100ML; MG/100ML
6000 INJECTION, SOLUTION INTRAPERITONEAL EVERY 24 HOURS
Status: DISCONTINUED | OUTPATIENT
Start: 2023-07-21 | End: 2023-07-21 | Stop reason: SDUPTHER

## 2023-07-21 RX ORDER — INSULIN GLARGINE 100 [IU]/ML
10 INJECTION, SOLUTION SUBCUTANEOUS
Status: DISCONTINUED | OUTPATIENT
Start: 2023-07-22 | End: 2023-07-24 | Stop reason: HOSPADM

## 2023-07-21 RX ORDER — SODIUM CHLORIDE, SODIUM LACTATE, CALCIUM CHLORIDE, MAGNESIUM CHLORIDE AND DEXTROSE 1.5; 538; 448; 18.3; 5.08 G/100ML; MG/100ML; MG/100ML; MG/100ML; MG/100ML
6000 INJECTION, SOLUTION INTRAPERITONEAL EVERY 24 HOURS
Status: DISCONTINUED | OUTPATIENT
Start: 2023-07-21 | End: 2023-07-24

## 2023-07-21 RX ORDER — LEVOFLOXACIN 500 MG/1
500 TABLET, FILM COATED ORAL
Status: DISCONTINUED | OUTPATIENT
Start: 2023-07-23 | End: 2023-07-21

## 2023-07-21 RX ORDER — FOLIC ACID 1 MG/1
1 TABLET ORAL DAILY
Status: DISCONTINUED | OUTPATIENT
Start: 2023-07-21 | End: 2023-07-24 | Stop reason: HOSPADM

## 2023-07-21 RX ADMIN — TRAMADOL HYDROCHLORIDE 50 MG: 50 TABLET ORAL at 04:24

## 2023-07-21 RX ADMIN — ROSUVASTATIN CALCIUM 40 MG: 40 TABLET, COATED ORAL at 09:25

## 2023-07-21 RX ADMIN — AMPICILLIN 500 MG: 500 CAPSULE ORAL at 13:59

## 2023-07-21 RX ADMIN — ASPIRIN 325 MG: 325 TABLET ORAL at 09:25

## 2023-07-21 RX ADMIN — POLYETHYLENE GLYCOL 3350 17 G: 17 POWDER, FOR SOLUTION ORAL at 09:21

## 2023-07-21 RX ADMIN — SODIUM CHLORIDE, PRESERVATIVE FREE 10 ML: 5 INJECTION INTRAVENOUS at 09:22

## 2023-07-21 RX ADMIN — CARVEDILOL 25 MG: 12.5 TABLET, FILM COATED ORAL at 17:42

## 2023-07-21 RX ADMIN — HEPARIN SODIUM 5000 UNITS: 5000 INJECTION INTRAVENOUS; SUBCUTANEOUS at 21:20

## 2023-07-21 RX ADMIN — MONTELUKAST 10 MG: 10 TABLET, FILM COATED ORAL at 21:20

## 2023-07-21 RX ADMIN — MELATONIN 3 MG: at 21:20

## 2023-07-21 RX ADMIN — LEVOFLOXACIN 750 MG: 750 TABLET, FILM COATED ORAL at 09:25

## 2023-07-21 RX ADMIN — CARVEDILOL 25 MG: 12.5 TABLET, FILM COATED ORAL at 09:25

## 2023-07-21 RX ADMIN — SODIUM CHLORIDE, PRESERVATIVE FREE 10 ML: 5 INJECTION INTRAVENOUS at 21:20

## 2023-07-21 RX ADMIN — DULOXETINE 20 MG: 20 CAPSULE, DELAYED RELEASE ORAL at 21:20

## 2023-07-21 RX ADMIN — TRAMADOL HYDROCHLORIDE 50 MG: 50 TABLET ORAL at 17:46

## 2023-07-21 RX ADMIN — FOLIC ACID 1 MG: 1 TABLET ORAL at 17:42

## 2023-07-21 RX ADMIN — DULOXETINE 20 MG: 20 CAPSULE, DELAYED RELEASE ORAL at 09:25

## 2023-07-21 RX ADMIN — POTASSIUM CHLORIDE 40 MEQ: 750 TABLET, EXTENDED RELEASE ORAL at 10:03

## 2023-07-21 RX ADMIN — SODIUM CHLORIDE, SODIUM LACTATE, CALCIUM CHLORIDE, MAGNESIUM CHLORIDE AND DEXTROSE 6000 ML: 1.5; 538; 448; 18.3; 5.08 INJECTION, SOLUTION INTRAPERITONEAL at 16:31

## 2023-07-21 RX ADMIN — GENTAMICIN SULFATE: 1 OINTMENT TOPICAL at 16:32

## 2023-07-21 RX ADMIN — HEPARIN SODIUM 5000 UNITS: 5000 INJECTION INTRAVENOUS; SUBCUTANEOUS at 06:17

## 2023-07-21 RX ADMIN — AMPICILLIN 500 MG: 500 CAPSULE ORAL at 23:17

## 2023-07-21 RX ADMIN — HEPARIN SODIUM 5000 UNITS: 5000 INJECTION INTRAVENOUS; SUBCUTANEOUS at 14:00

## 2023-07-21 ASSESSMENT — PAIN DESCRIPTION - DESCRIPTORS: DESCRIPTORS: ACHING

## 2023-07-21 ASSESSMENT — PAIN DESCRIPTION - ORIENTATION: ORIENTATION: POSTERIOR

## 2023-07-21 ASSESSMENT — PAIN DESCRIPTION - LOCATION
LOCATION: BACK
LOCATION: BACK

## 2023-07-21 ASSESSMENT — PAIN SCALES - GENERAL
PAINLEVEL_OUTOF10: 2
PAINLEVEL_OUTOF10: 0
PAINLEVEL_OUTOF10: 4
PAINLEVEL_OUTOF10: 7
PAINLEVEL_OUTOF10: 6
PAINLEVEL_OUTOF10: 0
PAINLEVEL_OUTOF10: 0

## 2023-07-21 NOTE — PLAN OF CARE
1900: Bedside and Verbal shift change report given to Priscila Hendricks RN (oncoming nurse) by Abhay Wilson RN (offgoing nurse). Report included the following information Nurse Handoff Report. End of Shift Note    Bedside shift change report given to JANES ALEJANDRE (oncoming nurse) by Delfino Dumont RN (offgoing nurse). Report included the following information SBAR    Shift worked:  [id number]     Shift summary and any significant changes:     No significant changes. Concerns for physician to address:       Zone phone for oncoming shift:          Activity:     Number times ambulated in hallways past shift: 0  Number of times OOB to chair past shift: 1    Cardiac:   Cardiac Monitoring: Yes           Access:  Current line(s): PIV     Genitourinary:   Urinary status: oliguric    Respiratory:      Chronic home O2 use?: NO  Incentive spirometer at bedside: NO       GI:     Current diet:  ADULT DIET;  Regular; 4 carb choices (60 gm/meal)  Passing flatus: YES  Tolerating current diet: YES       Pain Management:   Patient states pain is manageable on current regimen: YES    Skin:     Interventions: increase time out of bed and limit briefs    Patient Safety:  Fall Score:    Interventions: bed/chair alarm and gripper socks       Length of Stay:  Expected LOS: 3  Actual LOS: 3      TITI ELLSWORTH RN                            Problem: Discharge Planning  Goal: Discharge to home or other facility with appropriate resources  Outcome: Progressing     Problem: Pain  Goal: Verbalizes/displays adequate comfort level or baseline comfort level  Outcome: Progressing

## 2023-07-22 LAB
ALBUMIN SERPL-MCNC: 1.4 G/DL (ref 3.5–5)
ANION GAP SERPL CALC-SCNC: 7 MMOL/L (ref 5–15)
ANION GAP SERPL CALC-SCNC: 8 MMOL/L (ref 5–15)
ANION GAP SERPL CALC-SCNC: 8 MMOL/L (ref 5–15)
BASOPHILS # BLD: 0 K/UL (ref 0–0.1)
BASOPHILS NFR BLD: 0 % (ref 0–1)
BUN SERPL-MCNC: 11 MG/DL (ref 6–20)
BUN/CREAT SERPL: 2 (ref 12–20)
BUN/CREAT SERPL: 3 (ref 12–20)
BUN/CREAT SERPL: 3 (ref 12–20)
CALCIUM SERPL-MCNC: 7.2 MG/DL (ref 8.5–10.1)
CALCIUM SERPL-MCNC: 7.2 MG/DL (ref 8.5–10.1)
CALCIUM SERPL-MCNC: 7.4 MG/DL (ref 8.5–10.1)
CHLORIDE SERPL-SCNC: 96 MMOL/L (ref 97–108)
CHLORIDE SERPL-SCNC: 97 MMOL/L (ref 97–108)
CHLORIDE SERPL-SCNC: 98 MMOL/L (ref 97–108)
CO2 SERPL-SCNC: 29 MMOL/L (ref 21–32)
CREAT SERPL-MCNC: 4.3 MG/DL (ref 0.55–1.02)
CREAT SERPL-MCNC: 4.4 MG/DL (ref 0.55–1.02)
CREAT SERPL-MCNC: 4.57 MG/DL (ref 0.55–1.02)
DIFFERENTIAL METHOD BLD: ABNORMAL
EOSINOPHIL # BLD: 0.2 K/UL (ref 0–0.4)
EOSINOPHIL NFR BLD: 3 % (ref 0–7)
ERYTHROCYTE [DISTWIDTH] IN BLOOD BY AUTOMATED COUNT: 19.9 % (ref 11.5–14.5)
GLUCOSE BLD STRIP.AUTO-MCNC: 100 MG/DL (ref 65–117)
GLUCOSE BLD STRIP.AUTO-MCNC: 116 MG/DL (ref 65–117)
GLUCOSE BLD STRIP.AUTO-MCNC: 120 MG/DL (ref 65–117)
GLUCOSE BLD STRIP.AUTO-MCNC: 66 MG/DL (ref 65–117)
GLUCOSE BLD STRIP.AUTO-MCNC: 67 MG/DL (ref 65–117)
GLUCOSE BLD STRIP.AUTO-MCNC: 78 MG/DL (ref 65–117)
GLUCOSE SERPL-MCNC: 77 MG/DL (ref 65–100)
GLUCOSE SERPL-MCNC: 79 MG/DL (ref 65–100)
GLUCOSE SERPL-MCNC: 83 MG/DL (ref 65–100)
HCT VFR BLD AUTO: 22.7 % (ref 35–47)
HGB BLD-MCNC: 7 G/DL (ref 11.5–16)
IMM GRANULOCYTES # BLD AUTO: 0 K/UL (ref 0–0.04)
IMM GRANULOCYTES NFR BLD AUTO: 1 % (ref 0–0.5)
LYMPHOCYTES # BLD: 1.9 K/UL (ref 0.8–3.5)
LYMPHOCYTES NFR BLD: 23 % (ref 12–49)
MCH RBC QN AUTO: 27.9 PG (ref 26–34)
MCHC RBC AUTO-ENTMCNC: 30.8 G/DL (ref 30–36.5)
MCV RBC AUTO: 90.4 FL (ref 80–99)
MONOCYTES # BLD: 0.6 K/UL (ref 0–1)
MONOCYTES NFR BLD: 7 % (ref 5–13)
NEUTS SEG # BLD: 5.6 K/UL (ref 1.8–8)
NEUTS SEG NFR BLD: 67 % (ref 32–75)
NRBC # BLD: 0 K/UL (ref 0–0.01)
NRBC BLD-RTO: 0 PER 100 WBC
PHOSPHATE SERPL-MCNC: 3.6 MG/DL (ref 2.6–4.7)
PLATELET # BLD AUTO: 352 K/UL (ref 150–400)
PMV BLD AUTO: 9.2 FL (ref 8.9–12.9)
POTASSIUM SERPL-SCNC: 3.8 MMOL/L (ref 3.5–5.1)
POTASSIUM SERPL-SCNC: 4.4 MMOL/L (ref 3.5–5.1)
POTASSIUM SERPL-SCNC: 4.7 MMOL/L (ref 3.5–5.1)
RBC # BLD AUTO: 2.51 M/UL (ref 3.8–5.2)
SERVICE CMNT-IMP: ABNORMAL
SERVICE CMNT-IMP: NORMAL
SODIUM SERPL-SCNC: 133 MMOL/L (ref 136–145)
SODIUM SERPL-SCNC: 134 MMOL/L (ref 136–145)
SODIUM SERPL-SCNC: 134 MMOL/L (ref 136–145)
WBC # BLD AUTO: 8.4 K/UL (ref 3.6–11)

## 2023-07-22 PROCEDURE — 6370000000 HC RX 637 (ALT 250 FOR IP): Performed by: INTERNAL MEDICINE

## 2023-07-22 PROCEDURE — 85025 COMPLETE CBC W/AUTO DIFF WBC: CPT

## 2023-07-22 PROCEDURE — A4722 DIALYS SOL FLD VOL > 1999CC: HCPCS | Performed by: INTERNAL MEDICINE

## 2023-07-22 PROCEDURE — 6360000002 HC RX W HCPCS: Performed by: NURSE PRACTITIONER

## 2023-07-22 PROCEDURE — 80048 BASIC METABOLIC PNL TOTAL CA: CPT

## 2023-07-22 PROCEDURE — 2580000003 HC RX 258: Performed by: INTERNAL MEDICINE

## 2023-07-22 PROCEDURE — 80069 RENAL FUNCTION PANEL: CPT

## 2023-07-22 PROCEDURE — 2060000000 HC ICU INTERMEDIATE R&B

## 2023-07-22 PROCEDURE — 36415 COLL VENOUS BLD VENIPUNCTURE: CPT

## 2023-07-22 PROCEDURE — 90945 DIALYSIS ONE EVALUATION: CPT

## 2023-07-22 PROCEDURE — 6360000002 HC RX W HCPCS: Performed by: INTERNAL MEDICINE

## 2023-07-22 PROCEDURE — 82962 GLUCOSE BLOOD TEST: CPT

## 2023-07-22 PROCEDURE — 6370000000 HC RX 637 (ALT 250 FOR IP): Performed by: STUDENT IN AN ORGANIZED HEALTH CARE EDUCATION/TRAINING PROGRAM

## 2023-07-22 RX ORDER — POTASSIUM CHLORIDE 750 MG/1
20 TABLET, FILM COATED, EXTENDED RELEASE ORAL DAILY
Status: DISCONTINUED | OUTPATIENT
Start: 2023-07-23 | End: 2023-07-24

## 2023-07-22 RX ADMIN — HEPARIN SODIUM 5000 UNITS: 5000 INJECTION INTRAVENOUS; SUBCUTANEOUS at 21:03

## 2023-07-22 RX ADMIN — TRAMADOL HYDROCHLORIDE 50 MG: 50 TABLET ORAL at 21:03

## 2023-07-22 RX ADMIN — FOLIC ACID 1 MG: 1 TABLET ORAL at 08:43

## 2023-07-22 RX ADMIN — AMPICILLIN 500 MG: 500 CAPSULE ORAL at 12:15

## 2023-07-22 RX ADMIN — SODIUM CHLORIDE, PRESERVATIVE FREE 10 ML: 5 INJECTION INTRAVENOUS at 08:43

## 2023-07-22 RX ADMIN — SODIUM CHLORIDE, SODIUM LACTATE, CALCIUM CHLORIDE, MAGNESIUM CHLORIDE AND DEXTROSE 6000 ML: 1.5; 538; 448; 18.3; 5.08 INJECTION, SOLUTION INTRAPERITONEAL at 16:48

## 2023-07-22 RX ADMIN — MELATONIN 3 MG: at 21:03

## 2023-07-22 RX ADMIN — POTASSIUM CHLORIDE 40 MEQ: 750 TABLET, EXTENDED RELEASE ORAL at 08:43

## 2023-07-22 RX ADMIN — TRAMADOL HYDROCHLORIDE 50 MG: 50 TABLET ORAL at 09:15

## 2023-07-22 RX ADMIN — GENTAMICIN SULFATE: 1 OINTMENT TOPICAL at 16:48

## 2023-07-22 RX ADMIN — HEPARIN SODIUM 5000 UNITS: 5000 INJECTION INTRAVENOUS; SUBCUTANEOUS at 14:03

## 2023-07-22 RX ADMIN — CARVEDILOL 25 MG: 12.5 TABLET, FILM COATED ORAL at 18:51

## 2023-07-22 RX ADMIN — MONTELUKAST 10 MG: 10 TABLET, FILM COATED ORAL at 21:03

## 2023-07-22 RX ADMIN — HEPARIN SODIUM 5000 UNITS: 5000 INJECTION INTRAVENOUS; SUBCUTANEOUS at 06:03

## 2023-07-22 RX ADMIN — AMPICILLIN 500 MG: 500 CAPSULE ORAL at 23:39

## 2023-07-22 RX ADMIN — ROSUVASTATIN CALCIUM 40 MG: 40 TABLET, COATED ORAL at 08:43

## 2023-07-22 RX ADMIN — SODIUM CHLORIDE, PRESERVATIVE FREE 10 ML: 5 INJECTION INTRAVENOUS at 21:03

## 2023-07-22 RX ADMIN — DULOXETINE 20 MG: 20 CAPSULE, DELAYED RELEASE ORAL at 08:43

## 2023-07-22 RX ADMIN — ASPIRIN 325 MG: 325 TABLET ORAL at 08:43

## 2023-07-22 RX ADMIN — CARVEDILOL 25 MG: 12.5 TABLET, FILM COATED ORAL at 08:48

## 2023-07-22 RX ADMIN — DULOXETINE 20 MG: 20 CAPSULE, DELAYED RELEASE ORAL at 21:03

## 2023-07-22 ASSESSMENT — PAIN SCALES - WONG BAKER: WONGBAKER_NUMERICALRESPONSE: 0

## 2023-07-22 ASSESSMENT — PAIN DESCRIPTION - ORIENTATION
ORIENTATION: POSTERIOR
ORIENTATION: POSTERIOR

## 2023-07-22 ASSESSMENT — PAIN SCALES - GENERAL
PAINLEVEL_OUTOF10: 5
PAINLEVEL_OUTOF10: 7
PAINLEVEL_OUTOF10: 2
PAINLEVEL_OUTOF10: 0

## 2023-07-22 ASSESSMENT — PAIN DESCRIPTION - LOCATION
LOCATION: BACK
LOCATION: BACK

## 2023-07-22 ASSESSMENT — PAIN DESCRIPTION - DESCRIPTORS
DESCRIPTORS: ACHING
DESCRIPTORS: ACHING

## 2023-07-22 ASSESSMENT — PAIN - FUNCTIONAL ASSESSMENT: PAIN_FUNCTIONAL_ASSESSMENT: ACTIVITIES ARE NOT PREVENTED

## 2023-07-22 NOTE — FLOWSHEET NOTE
07/22/23 0330   Peritoneal Dialysis Catheter Mid lower abdomen   No placement date or time found. Catheter Location: Mid lower abdomen   Status Deaccessed   Site Condition Clean, dry, intact   Dressing Status Clean, dry & intact   Dressing Gauze   Date of Last Dressing Change 07/21/23   Dialysis Type Continuous cycling   Catheter Care Given Yes  (Two minute Alcavis scrub/soak performed prior to disconnection.)   Post-Treatment (Cycler)   Average Dwell Time (Hours:Minutes) 1:36   Lost Dwell Time (Hours:Minutes) :09   Effluent Appearance Clear;Yellow   Volume Gain (mL) 460 mL  (Idrain 217 ml + total  ml - last fill 1000 ml = -460 ml)     Primary RN SBAR: Jinny Naylor RN  Comments: Used cassettes, lines and bags discarded in red bin. Patient left with bed low, siderails X 2, call bell and belongings in reach.

## 2023-07-22 NOTE — PLAN OF CARE
Problem: Pain  Goal: Verbalizes/displays adequate comfort level or baseline comfort level  7/21/2023 2035 by Kathie Ferreira RN  Outcome: Progressing  7/21/2023 0701 by Camille Ann RN  Outcome: Progressing     Problem: Safety - Adult  Goal: Free from fall injury  Outcome: Progressing     Problem: ABCDS Injury Assessment  Goal: Absence of physical injury  Outcome: Progressing     Problem: Skin/Tissue Integrity  Goal: Absence of new skin breakdown  Description: 1. Monitor for areas of redness and/or skin breakdown  2. Assess vascular access sites hourly  3. Every 4-6 hours minimum:  Change oxygen saturation probe site  4. Every 4-6 hours:  If on nasal continuous positive airway pressure, respiratory therapy assess nares and determine need for appliance change or resting period.   Outcome: Progressing

## 2023-07-23 LAB
ANION GAP SERPL CALC-SCNC: 6 MMOL/L (ref 5–15)
ANION GAP SERPL CALC-SCNC: 6 MMOL/L (ref 5–15)
BUN SERPL-MCNC: 10 MG/DL (ref 6–20)
BUN SERPL-MCNC: 10 MG/DL (ref 6–20)
BUN/CREAT SERPL: 2 (ref 12–20)
BUN/CREAT SERPL: 2 (ref 12–20)
CALCIUM SERPL-MCNC: 7.3 MG/DL (ref 8.5–10.1)
CALCIUM SERPL-MCNC: 7.3 MG/DL (ref 8.5–10.1)
CHLORIDE SERPL-SCNC: 97 MMOL/L (ref 97–108)
CHLORIDE SERPL-SCNC: 98 MMOL/L (ref 97–108)
CO2 SERPL-SCNC: 29 MMOL/L (ref 21–32)
CO2 SERPL-SCNC: 29 MMOL/L (ref 21–32)
CREAT SERPL-MCNC: 4.26 MG/DL (ref 0.55–1.02)
CREAT SERPL-MCNC: 4.27 MG/DL (ref 0.55–1.02)
ERYTHROCYTE [DISTWIDTH] IN BLOOD BY AUTOMATED COUNT: 19.7 % (ref 11.5–14.5)
GLUCOSE BLD STRIP.AUTO-MCNC: 149 MG/DL (ref 65–117)
GLUCOSE BLD STRIP.AUTO-MCNC: 149 MG/DL (ref 65–117)
GLUCOSE BLD STRIP.AUTO-MCNC: 77 MG/DL (ref 65–117)
GLUCOSE BLD STRIP.AUTO-MCNC: 88 MG/DL (ref 65–117)
GLUCOSE SERPL-MCNC: 134 MG/DL (ref 65–100)
GLUCOSE SERPL-MCNC: 92 MG/DL (ref 65–100)
HCT VFR BLD AUTO: 22.9 % (ref 35–47)
HEMOCCULT STL QL: NEGATIVE
HGB BLD-MCNC: 7 G/DL (ref 11.5–16)
MCH RBC QN AUTO: 27.5 PG (ref 26–34)
MCHC RBC AUTO-ENTMCNC: 30.6 G/DL (ref 30–36.5)
MCV RBC AUTO: 89.8 FL (ref 80–99)
NRBC # BLD: 0 K/UL (ref 0–0.01)
NRBC BLD-RTO: 0 PER 100 WBC
PLATELET # BLD AUTO: 401 K/UL (ref 150–400)
PMV BLD AUTO: 9.2 FL (ref 8.9–12.9)
POTASSIUM SERPL-SCNC: 4.3 MMOL/L (ref 3.5–5.1)
POTASSIUM SERPL-SCNC: 4.6 MMOL/L (ref 3.5–5.1)
RBC # BLD AUTO: 2.55 M/UL (ref 3.8–5.2)
SERVICE CMNT-IMP: ABNORMAL
SERVICE CMNT-IMP: ABNORMAL
SERVICE CMNT-IMP: NORMAL
SERVICE CMNT-IMP: NORMAL
SODIUM SERPL-SCNC: 132 MMOL/L (ref 136–145)
SODIUM SERPL-SCNC: 133 MMOL/L (ref 136–145)
WBC # BLD AUTO: 6 K/UL (ref 3.6–11)

## 2023-07-23 PROCEDURE — 82962 GLUCOSE BLOOD TEST: CPT

## 2023-07-23 PROCEDURE — 85027 COMPLETE CBC AUTOMATED: CPT

## 2023-07-23 PROCEDURE — 6370000000 HC RX 637 (ALT 250 FOR IP): Performed by: INTERNAL MEDICINE

## 2023-07-23 PROCEDURE — 36415 COLL VENOUS BLD VENIPUNCTURE: CPT

## 2023-07-23 PROCEDURE — 90945 DIALYSIS ONE EVALUATION: CPT

## 2023-07-23 PROCEDURE — 2060000000 HC ICU INTERMEDIATE R&B

## 2023-07-23 PROCEDURE — 80048 BASIC METABOLIC PNL TOTAL CA: CPT

## 2023-07-23 PROCEDURE — 6360000002 HC RX W HCPCS: Performed by: INTERNAL MEDICINE

## 2023-07-23 PROCEDURE — A4722 DIALYS SOL FLD VOL > 1999CC: HCPCS | Performed by: INTERNAL MEDICINE

## 2023-07-23 PROCEDURE — 2580000003 HC RX 258: Performed by: INTERNAL MEDICINE

## 2023-07-23 PROCEDURE — 6360000002 HC RX W HCPCS: Performed by: NURSE PRACTITIONER

## 2023-07-23 PROCEDURE — 82272 OCCULT BLD FECES 1-3 TESTS: CPT

## 2023-07-23 PROCEDURE — 6370000000 HC RX 637 (ALT 250 FOR IP): Performed by: STUDENT IN AN ORGANIZED HEALTH CARE EDUCATION/TRAINING PROGRAM

## 2023-07-23 RX ADMIN — GENTAMICIN SULFATE: 1 OINTMENT TOPICAL at 13:48

## 2023-07-23 RX ADMIN — TRAMADOL HYDROCHLORIDE 50 MG: 50 TABLET ORAL at 21:15

## 2023-07-23 RX ADMIN — ROSUVASTATIN CALCIUM 40 MG: 40 TABLET, COATED ORAL at 12:48

## 2023-07-23 RX ADMIN — CARVEDILOL 25 MG: 12.5 TABLET, FILM COATED ORAL at 19:02

## 2023-07-23 RX ADMIN — DULOXETINE 20 MG: 20 CAPSULE, DELAYED RELEASE ORAL at 12:48

## 2023-07-23 RX ADMIN — HYDRALAZINE HYDROCHLORIDE 50 MG: 50 TABLET, FILM COATED ORAL at 14:44

## 2023-07-23 RX ADMIN — AMPICILLIN 500 MG: 500 CAPSULE ORAL at 12:48

## 2023-07-23 RX ADMIN — SODIUM CHLORIDE, SODIUM LACTATE, CALCIUM CHLORIDE, MAGNESIUM CHLORIDE AND DEXTROSE 6000 ML: 1.5; 538; 448; 18.3; 5.08 INJECTION, SOLUTION INTRAPERITONEAL at 13:49

## 2023-07-23 RX ADMIN — CARVEDILOL 25 MG: 12.5 TABLET, FILM COATED ORAL at 12:48

## 2023-07-23 RX ADMIN — SODIUM CHLORIDE, PRESERVATIVE FREE 10 ML: 5 INJECTION INTRAVENOUS at 12:51

## 2023-07-23 RX ADMIN — HYDRALAZINE HYDROCHLORIDE 50 MG: 50 TABLET, FILM COATED ORAL at 21:13

## 2023-07-23 RX ADMIN — SODIUM CHLORIDE, PRESERVATIVE FREE 10 ML: 5 INJECTION INTRAVENOUS at 21:13

## 2023-07-23 RX ADMIN — POTASSIUM CHLORIDE 20 MEQ: 750 TABLET, FILM COATED, EXTENDED RELEASE ORAL at 12:48

## 2023-07-23 RX ADMIN — HEPARIN SODIUM 5000 UNITS: 5000 INJECTION INTRAVENOUS; SUBCUTANEOUS at 06:17

## 2023-07-23 RX ADMIN — DULOXETINE 20 MG: 20 CAPSULE, DELAYED RELEASE ORAL at 21:13

## 2023-07-23 RX ADMIN — ASPIRIN 325 MG: 325 TABLET ORAL at 12:48

## 2023-07-23 RX ADMIN — FOLIC ACID 1 MG: 1 TABLET ORAL at 12:48

## 2023-07-23 RX ADMIN — MONTELUKAST 10 MG: 10 TABLET, FILM COATED ORAL at 21:13

## 2023-07-23 ASSESSMENT — PAIN SCALES - GENERAL
PAINLEVEL_OUTOF10: 0
PAINLEVEL_OUTOF10: 7
PAINLEVEL_OUTOF10: 0
PAINLEVEL_OUTOF10: 0

## 2023-07-23 ASSESSMENT — PAIN DESCRIPTION - ORIENTATION: ORIENTATION: LEFT;MID

## 2023-07-23 ASSESSMENT — PAIN DESCRIPTION - DESCRIPTORS: DESCRIPTORS: ACHING

## 2023-07-23 ASSESSMENT — PAIN DESCRIPTION - LOCATION: LOCATION: BACK

## 2023-07-23 NOTE — FLOWSHEET NOTE
LORA BARBOZA CCPD  CCPD CONNECTION NOTE       07/23/23 1400   Vitals   BP (!) 148/65   Pulse (!) 2   Respirations 16   SpO2 97 %   Peritoneal Dialysis Catheter Mid lower abdomen   No placement date or time found. Catheter Location: Mid lower abdomen   Status Accessed   Site Condition Clean, dry, intact   Dressing Status New dressing applied   Dressing Gauze   Date of Last Dressing Change 07/23/23   Dialysis Type Continuous cycling   Exit Site Condition Excellent   Catheter Care Given Yes   Cycler   Informed Consent    (chronic consent applies)   Total Volume Programmed 8800 mL  (cycler programmed at 9000)   Therapy Time (Hours:Minutes) 8:30   Cycler Type [facility]   Fill Volume 2200 mL   Last Fill Volume 0 mL  (for procedure in am)   Dextrose Setting Same (Nonextraneal)   I Drain Alarm 700 mL   Number of Cycles 4   Bag Volume 6000 mL   Number of Bags Used 2   Dianeal Solution   ((2) 1.5% dextrose in 6000ml)     Primary RN SBAR: Jose G Chang RN  Patient Education: Procedural  CCPD initiated as per MD order. Note states patient for egd/colonoscopy Monday morning. Last fill omitted in preparation for this, will resume 1000ml last fill with 07/24/23 ccpd connection. Hepatitis B Surface Ag   Date/Time Value Ref Range Status   07/19/2023 12:04 PM <0.10 Index Final     Hep B S Ab   Date/Time Value Ref Range Status   07/19/2023 12:04 PM <3.10 mIU/mL Final      07/23/23 1400   Observations & Evaluations   Level of Consciousness 0   Oriented X 4   Heart Rhythm Regular   Respiratory Quality/Effort Unlabored   O2 Device None (Room air)   Bilateral Breath Sounds Clear   Skin Color   (appropriate for ethnicity)   Skin Condition/Temp Warm;Dry   Appetite Good   Abdomen Inspection Soft   Bowel Sounds (All Quadrants) Active   Edema Right lower extremity; Left lower extremity   RLE Edema +1   LLE Edema +2   Vital Signs   BP (!) 148/65   Pulse (!) 2   Respirations 16   SpO2 97 %   Technical Checks   ICEBOAT I;C;E;B;O;A;T

## 2023-07-23 NOTE — PLAN OF CARE
Problem: Discharge Planning  Goal: Discharge to home or other facility with appropriate resources  Outcome: Progressing     Problem: Pain  Goal: Verbalizes/displays adequate comfort level or baseline comfort level  7/22/2023 2144 by Gopal Abebe RN  Outcome: Progressing  7/22/2023 1957 by Rachel Roldan RN  Outcome: Progressing     Problem: Safety - Adult  Goal: Free from fall injury  7/22/2023 2144 by Gopal Abebe RN  Outcome: Progressing  7/22/2023 1957 by Rachel Roldan RN  Outcome: Progressing     Problem: ABCDS Injury Assessment  Goal: Absence of physical injury  Outcome: Progressing     Problem: Skin/Tissue Integrity  Goal: Absence of new skin breakdown  Description: 1. Monitor for areas of redness and/or skin breakdown  2. Assess vascular access sites hourly  3. Every 4-6 hours minimum:  Change oxygen saturation probe site  4. Every 4-6 hours:  If on nasal continuous positive airway pressure, respiratory therapy assess nares and determine need for appliance change or resting period.   Outcome: Progressing     Problem: Chronic Conditions and Co-morbidities  Goal: Patient's chronic conditions and co-morbidity symptoms are monitored and maintained or improved  Outcome: Progressing

## 2023-07-23 NOTE — FLOWSHEET NOTE
07/23/23 0528   Peritoneal Dialysis Catheter Mid lower abdomen   No placement date or time found. Catheter Location: Mid lower abdomen   Status Deaccessed   Site Condition Clean, dry, intact   Dressing Status Clean, dry & intact   Dressing Gauze   Date of Last Dressing Change 07/22/23   Dialysis Type Continuous cycling   Catheter Care Given Yes  (Two minute Alcavis scrub/soak performed prior to disconnection.)   Post-Treatment (Cycler)   Average Dwell Time (Hours:Minutes) 1:32   Lost Dwell Time (Hours:Minutes) :17   Effluent Appearance Clear;Yellow   Volume Gain (mL) 499 mL  (Idrain 422 + total UF 79 - last fill 1000 = Net UF -499)     Primary RN SBAR: Tripp Kc RN  Comments: Old bags, lines and cassettes discarded in red bin. Pt left with bed low, siderails up X 2, call bell and belongings in reach.

## 2023-07-24 VITALS
SYSTOLIC BLOOD PRESSURE: 157 MMHG | HEART RATE: 91 BPM | WEIGHT: 196.65 LBS | BODY MASS INDEX: 33.76 KG/M2 | TEMPERATURE: 98 F | DIASTOLIC BLOOD PRESSURE: 66 MMHG | OXYGEN SATURATION: 96 % | RESPIRATION RATE: 20 BRPM

## 2023-07-24 LAB
ANION GAP SERPL CALC-SCNC: 8 MMOL/L (ref 5–15)
BASOPHILS # BLD: 0.1 K/UL (ref 0–0.1)
BASOPHILS NFR BLD: 1 % (ref 0–1)
BUN SERPL-MCNC: 10 MG/DL (ref 6–20)
BUN/CREAT SERPL: 2 (ref 12–20)
CALCIUM SERPL-MCNC: 7.4 MG/DL (ref 8.5–10.1)
CHLORIDE SERPL-SCNC: 98 MMOL/L (ref 97–108)
CO2 SERPL-SCNC: 28 MMOL/L (ref 21–32)
CREAT SERPL-MCNC: 4.45 MG/DL (ref 0.55–1.02)
DIFFERENTIAL METHOD BLD: ABNORMAL
EOSINOPHIL # BLD: 0.2 K/UL (ref 0–0.4)
EOSINOPHIL NFR BLD: 5 % (ref 0–7)
ERYTHROCYTE [DISTWIDTH] IN BLOOD BY AUTOMATED COUNT: 19.7 % (ref 11.5–14.5)
GLUCOSE BLD STRIP.AUTO-MCNC: 102 MG/DL (ref 65–117)
GLUCOSE BLD STRIP.AUTO-MCNC: 88 MG/DL (ref 65–117)
GLUCOSE SERPL-MCNC: 94 MG/DL (ref 65–100)
HCT VFR BLD AUTO: 24.3 % (ref 35–47)
HGB BLD-MCNC: 7.5 G/DL (ref 11.5–16)
IMM GRANULOCYTES # BLD AUTO: 0 K/UL (ref 0–0.04)
IMM GRANULOCYTES NFR BLD AUTO: 1 % (ref 0–0.5)
LYMPHOCYTES # BLD: 1.8 K/UL (ref 0.8–3.5)
LYMPHOCYTES NFR BLD: 35 % (ref 12–49)
MCH RBC QN AUTO: 27.8 PG (ref 26–34)
MCHC RBC AUTO-ENTMCNC: 30.9 G/DL (ref 30–36.5)
MCV RBC AUTO: 90 FL (ref 80–99)
MONOCYTES # BLD: 0.5 K/UL (ref 0–1)
MONOCYTES NFR BLD: 10 % (ref 5–13)
NEUTS SEG # BLD: 2.6 K/UL (ref 1.8–8)
NEUTS SEG NFR BLD: 48 % (ref 32–75)
NRBC # BLD: 0 K/UL (ref 0–0.01)
NRBC BLD-RTO: 0 PER 100 WBC
PHOSPHATE SERPL-MCNC: 4.3 MG/DL (ref 2.6–4.7)
PLATELET # BLD AUTO: 388 K/UL (ref 150–400)
PMV BLD AUTO: 8.8 FL (ref 8.9–12.9)
POTASSIUM SERPL-SCNC: 4.6 MMOL/L (ref 3.5–5.1)
RBC # BLD AUTO: 2.7 M/UL (ref 3.8–5.2)
SERVICE CMNT-IMP: NORMAL
SERVICE CMNT-IMP: NORMAL
SODIUM SERPL-SCNC: 134 MMOL/L (ref 136–145)
WBC # BLD AUTO: 5.3 K/UL (ref 3.6–11)

## 2023-07-24 PROCEDURE — 85025 COMPLETE CBC W/AUTO DIFF WBC: CPT

## 2023-07-24 PROCEDURE — 82962 GLUCOSE BLOOD TEST: CPT

## 2023-07-24 PROCEDURE — 6370000000 HC RX 637 (ALT 250 FOR IP): Performed by: INTERNAL MEDICINE

## 2023-07-24 PROCEDURE — 84100 ASSAY OF PHOSPHORUS: CPT

## 2023-07-24 PROCEDURE — 2580000003 HC RX 258: Performed by: INTERNAL MEDICINE

## 2023-07-24 PROCEDURE — 80048 BASIC METABOLIC PNL TOTAL CA: CPT

## 2023-07-24 PROCEDURE — 36415 COLL VENOUS BLD VENIPUNCTURE: CPT

## 2023-07-24 PROCEDURE — 97116 GAIT TRAINING THERAPY: CPT

## 2023-07-24 PROCEDURE — 6370000000 HC RX 637 (ALT 250 FOR IP): Performed by: STUDENT IN AN ORGANIZED HEALTH CARE EDUCATION/TRAINING PROGRAM

## 2023-07-24 RX ORDER — FOLIC ACID 1 MG/1
1 TABLET ORAL DAILY
Qty: 30 TABLET | Refills: 3 | Status: SHIPPED | OUTPATIENT
Start: 2023-07-25

## 2023-07-24 RX ORDER — SODIUM CHLORIDE, SODIUM LACTATE, CALCIUM CHLORIDE, MAGNESIUM CHLORIDE AND DEXTROSE 2.5; 538; 448; 18.3; 5.08 G/100ML; MG/100ML; MG/100ML; MG/100ML; MG/100ML
6000 INJECTION, SOLUTION INTRAPERITONEAL EVERY 24 HOURS
Status: DISCONTINUED | OUTPATIENT
Start: 2023-07-24 | End: 2023-07-24 | Stop reason: HOSPADM

## 2023-07-24 RX ORDER — TRAMADOL HYDROCHLORIDE 50 MG/1
50 TABLET ORAL EVERY 6 HOURS PRN
Qty: 12 TABLET | Refills: 0 | Status: SHIPPED | OUTPATIENT
Start: 2023-07-24 | End: 2023-07-27

## 2023-07-24 RX ORDER — AMPICILLIN 500 MG/1
500 CAPSULE ORAL EVERY 12 HOURS
Qty: 14 CAPSULE | Refills: 0 | Status: SHIPPED | OUTPATIENT
Start: 2023-07-25 | End: 2023-08-01

## 2023-07-24 RX ORDER — INSULIN GLARGINE 100 [IU]/ML
5 INJECTION, SOLUTION SUBCUTANEOUS
Qty: 1 EACH | Refills: 0 | Status: SHIPPED | OUTPATIENT
Start: 2023-07-24 | End: 2023-08-23

## 2023-07-24 RX ADMIN — DULOXETINE 20 MG: 20 CAPSULE, DELAYED RELEASE ORAL at 11:09

## 2023-07-24 RX ADMIN — ROSUVASTATIN CALCIUM 40 MG: 40 TABLET, COATED ORAL at 11:09

## 2023-07-24 RX ADMIN — ASPIRIN 325 MG: 325 TABLET ORAL at 11:09

## 2023-07-24 RX ADMIN — AMLODIPINE BESYLATE 10 MG: 5 TABLET ORAL at 11:09

## 2023-07-24 RX ADMIN — AMPICILLIN 500 MG: 500 CAPSULE ORAL at 01:33

## 2023-07-24 RX ADMIN — FOLIC ACID 1 MG: 1 TABLET ORAL at 11:09

## 2023-07-24 RX ADMIN — HYDRALAZINE HYDROCHLORIDE 50 MG: 50 TABLET, FILM COATED ORAL at 11:09

## 2023-07-24 RX ADMIN — CARVEDILOL 25 MG: 12.5 TABLET, FILM COATED ORAL at 11:09

## 2023-07-24 RX ADMIN — TRAMADOL HYDROCHLORIDE 50 MG: 50 TABLET ORAL at 11:58

## 2023-07-24 RX ADMIN — SODIUM CHLORIDE, PRESERVATIVE FREE 10 ML: 5 INJECTION INTRAVENOUS at 11:18

## 2023-07-24 RX ADMIN — HYDRALAZINE HYDROCHLORIDE 50 MG: 50 TABLET, FILM COATED ORAL at 14:52

## 2023-07-24 RX ADMIN — AMPICILLIN 500 MG: 500 CAPSULE ORAL at 11:58

## 2023-07-24 ASSESSMENT — PAIN SCALES - WONG BAKER: WONGBAKER_NUMERICALRESPONSE: 0

## 2023-07-24 ASSESSMENT — PAIN DESCRIPTION - PAIN TYPE: TYPE: CHRONIC PAIN

## 2023-07-24 ASSESSMENT — PAIN DESCRIPTION - ORIENTATION: ORIENTATION: MID

## 2023-07-24 ASSESSMENT — PAIN DESCRIPTION - DESCRIPTORS: DESCRIPTORS: ACHING

## 2023-07-24 ASSESSMENT — PAIN DESCRIPTION - LOCATION: LOCATION: BACK

## 2023-07-24 ASSESSMENT — PAIN SCALES - GENERAL: PAINLEVEL_OUTOF10: 8

## 2023-07-24 ASSESSMENT — PAIN - FUNCTIONAL ASSESSMENT: PAIN_FUNCTIONAL_ASSESSMENT: ACTIVITIES ARE NOT PREVENTED

## 2023-07-24 NOTE — DISCHARGE INSTRUCTIONS
DISCHARGE DIAGNOSIS:  Severe hypokalemia K 1.5 POA  Hypomagnesemia Mg 1.4 POA  Generalized weakness likely secondary to hypokalemia POA  End-stage renal disease on peritoneal dialysis POA  Recent watery diarrhea POA x 1-2  weeks  Hypocalcemia  Anemia of chronic disease  Diabetes mellitus type 2 on chronic insulin, causing end-stage renal disease POA  Hypoglycemia    Essential HTN POA  Hyperlipidemia POA  CAD POA  Asthma POA    MEDICATIONS:  It is important that you take the medication exactly as they are prescribed. Keep your medication in the bottles provided by the pharmacist and keep a list of the medication names, dosages, and times to be taken in your wallet. Do not take other medications without consulting your doctor. Pain Management: per above medications    What to do at Home    Recommended diet:  diabetic diet    Recommended activity: activity as tolerated    If you have questions regarding the hospital related prescriptions or hospital related issues please call [facility] at . You can always direct your questions to your primary care doctor if you are unable to reach your hospital physician; your PCP works as an extension of your hospital doctor just like your hospital doctor is an extension of your PCP for your time at the hospital [facility]).     If you experience any of the following symptoms then please call your primary care physician or return to the emergency room if you cannot get hold of your doctor:  Fever, chills, nausea, vomiting, diarrhea, change in mentation, falling, bleeding, shortness of breath,

## 2023-07-24 NOTE — DISCHARGE SUMMARY
as: ROBAXIN     montelukast 10 MG tablet  Commonly known as: SINGULAIR     rosuvastatin 40 MG tablet  Commonly known as: CRESTOR            STOP taking these medications      furosemide 40 MG tablet  Commonly known as: LASIX     Lantus SoloStar 100 UNIT/ML injection pen  Generic drug: insulin glargine  Replaced by: insulin glargine 100 UNIT/ML injection vial     potassium chloride 20 MEQ extended release tablet  Commonly known as: KLOR-CON M     Trulicity 0.66 XX/9.1XQ Sopn  Generic drug: Dulaglutide               Where to Get Your Medications        These medications were sent to [address] [facility], [facility] [address]  [address] [facility] [address], [address] [address]      Phone: [phone]   ampicillin 876 MG capsule  folic acid 1 MG tablet  insulin glargine 100 UNIT/ML injection vial       Information about where to get these medications is not yet available    Ask your nurse or doctor about these medications  traMADol 50 MG tablet             DISPOSITION:    Home with Family: x      Home with HH/PT/OT/RN:    SNF/LTC:    SHANNON:    OTHER:            Code status:   Recommended diet: renal diet  Recommended activity: activity as tolerated  Wound care: None      Follow up with:   PCP : Britt Aparicio MD  [facility], [address] [place] [address]  [phone]    Go on 7/26/2023  at 4:30pm for your PCP hospital follow up. Please [id number] minutes early, bring photo ID, insurance cards, copay, all current medication bottles including any over the counter vitamins/meds, and any completed forms. Dr. Anastasia Ayala does not have any available appts at this time.           Total time in minutes spent coordinating this discharge (includes going over instructions, follow-up, prescriptions, and preparing report for sign off to her PCP) :  35 minutes
1 or 2

## 2023-07-24 NOTE — FLOWSHEET NOTE
07/24/23 0122   Peritoneal Dialysis Catheter Mid lower abdomen   No placement date or time found.    Catheter Location: Mid lower abdomen   Status Deaccessed   Site Condition Clean, dry, intact   Dressing Status Clean, dry & intact   Dressing Gauze   Date of Last Dressing Change 07/23/23   Dialysis Type Continuous cycling   Catheter Care Given Yes   Post-Treatment (Cycler)   Average Dwell Time (Hours:Minutes) 728   Lost Dwell Time (Hours:Minutes) 0:01   Effluent Appearance Clear;Yellow   PD Output (mL) -142 mL  ( +  - LF 1000= Net UF -142)     Primary RN SBAR: Kaley Yan, JANES             Hepatitis B Surface Ag   Date/Time Value Ref Range Status   07/19/2023 12:04 PM <0.10 Index Final            Hep B S Ab   Date/Time Value Ref Range Status   07/19/2023 12:04 PM <3.10 mIU/mL Final

## 2023-07-24 NOTE — PLAN OF CARE
Problem: Physical Therapy - Adult  Goal: By Discharge: Performs mobility at highest level of function for planned discharge setting. See evaluation for individualized goals. Description: FUNCTIONAL STATUS PRIOR TO ADMISSION: Patient was modified independent using a rollator for functional mobility. HOME SUPPORT PRIOR TO ADMISSION: The patient lived with her son and his \"friend\". Son works during the day, \"friend\" is home with her. 1-story home with 3 steps and B rails to enter    Physical Therapy Goals  Initiated 7/19/2023  1. Patient will move from supine to sit and sit to supine in bed with modified independence within 7 day(s). 2.  Patient will perform sit to stand with supervision/set-up within 7 day(s). 3.  Patient will transfer from bed to chair and chair to bed with supervision/set-up using the least restrictive device within 7 day(s). 4.  Patient will ambulate with supervision/set-up for 150 feet with the least restrictive device within 7 day(s). 5.  Patient will ascend/descend 3 stairs with B handrail(s) with supervision/set-up within 7 day(s). Outcome: Progressing   PHYSICAL THERAPY TREATMENT    Patient: Chinyere Escobar (49 y.o. female)  Date: 7/24/2023  Diagnosis: End stage renal disease ([address]) [N18.6]  Hypokalemia [E87.6] Hypokalemia  Procedure(s) (LRB):  EGD ESOPHAGOGASTRODUODENOSCOPY (N/A)  COLONOSCOPY DIAGNOSTIC (N/A)    Precautions: Fall Risk                    ASSESSMENT:  Patient continues to benefit from skilled PT services and is progressing towards goals. She demonstrates the ability to ambulate and transfer with the use of a RW and supervision. Patient ambulates slowly but with good balance. She ambulates increased distance today requiring two standing rest breaks. Patient reports she feels safe with discharge home. PLAN:  Patient continues to benefit from skilled intervention to address the above impairments.   Continue treatment per established plan of

## 2023-07-24 NOTE — CARE COORDINATION
The patient is clear for discharge from CM standpoint . Transition of Care Plan:    RUR: 19% (Moderate RUR)  Prior Level of Functioning: Independent with ADLS/IADLS  Disposition: Home with Home health ([address] [place] [address] has accepted)  If SNF or IPR: Date FOC offered: NA  Date FOC received:   Accepting facility:   Date authorization started with reference number:   Date authorization received and expires: Follow up appointments: PCP 7/26/23 1630  DME needed: Denies the need for any; has a rollator  Transportation at discharge: The patient's son Geraldine Otoniel [phone]  IM/IMM Medicare/ letter given: 2nd IM given 07/24/2023  Is patient a [place] and connected with [facility]? NA   If yes, was [employer] transfer form completed and VA notified? Caregiver Contact: The patient's son Geraldine Otoniel [phone]  Discharge Caregiver contacted prior to discharge? Contacted rhe patient's son Geraldine Otoniel [phone]  Care Conference needed? No  Barriers to discharge: None       07/24/23 1322   Services At/After Discharge   Transition of Care Consult (CM Consult) Home Health   Internal Home Health No   Reason Outside Agency Chosen Unable to staff case   [facility]   Mode of Transport at Discharge Other (see comment)  (The patient's son Geraldine Otoniel [phone])   Confirm Follow Up Transport Self   Condition of Participation: Discharge Planning   The Plan for Transition of Care is related to the following treatment goals: Home health list   The Patient and/or Patient Representative was provided with a Choice of Provider? Patient   The Patient and/Or Patient Representative agree with the Discharge Plan? Yes   Freedom of Choice list was provided with basic dialogue that supports the patient's individualized plan of care/goals, treatment preferences, and shares the quality data associated with the providers?   Yes     Initial note: CM met with the patient and provided a home health agency list,

## 2023-08-10 NOTE — PROGRESS NOTES
Bedside and Verbal shift change report given to Beto Cobb RN (oncoming nurse) by Nerissa Byrne RN (offgoing nurse). Report included the following information SBAR. Quality 47: Advance Care Plan: Advance care planning not documented, reason not otherwise specified. Quality 130: Documentation Of Current Medications In The Medical Record: Current Medications Documented Detail Level: Detailed Quality 110: Preventive Care And Screening: Influenza Immunization: Influenza Immunization Administered during Influenza season Quality 137: Melanoma: Continuity Of Care - Recall System: Recall system not utilized, reason not otherwise specified Quality 226: Preventive Care And Screening: Tobacco Use: Screening And Cessation Intervention: Patient screened for tobacco use and is an ex/non-smoker Quality 138: Melanoma: Coordination Of Care: Treatment plan not communicated, reason not otherwise specified. Quality 431: Preventive Care And Screening: Unhealthy Alcohol Use - Screening: Patient identified as an unhealthy alcohol user when screened for unhealthy alcohol use using a systematic screening method and received brief counseling

## 2023-08-15 RX ORDER — POTASSIUM CHLORIDE 750 MG/1
20 CAPSULE, EXTENDED RELEASE ORAL 2 TIMES DAILY
Status: ON HOLD | COMMUNITY

## 2023-08-15 NOTE — PROGRESS NOTES
Patient scheduled for Endoscopy 8/16/2023. Contacted Julia [facility] and spoke to [place]-  nurse. Per [place], patient is to drain fully tonight and skip her exchanges. Patient was called and verbalizes understanding. Panflio Martinez in Endoscopy to make aware.

## 2023-08-16 ENCOUNTER — ANESTHESIA EVENT (OUTPATIENT)
Facility: HOSPITAL | Age: 68
End: 2023-08-16
Payer: MEDICARE

## 2023-08-16 ENCOUNTER — HOSPITAL ENCOUNTER (OUTPATIENT)
Facility: HOSPITAL | Age: 68
Setting detail: OUTPATIENT SURGERY
Discharge: HOME | End: 2023-08-16
Attending: INTERNAL MEDICINE | Admitting: INTERNAL MEDICINE
Payer: MEDICARE

## 2023-08-16 ENCOUNTER — ANESTHESIA (OUTPATIENT)
Facility: HOSPITAL | Age: 68
End: 2023-08-16
Payer: MEDICARE

## 2023-08-16 VITALS
SYSTOLIC BLOOD PRESSURE: 139 MMHG | OXYGEN SATURATION: 90 % | RESPIRATION RATE: 23 BRPM | WEIGHT: 196 LBS | HEART RATE: 119 BPM | DIASTOLIC BLOOD PRESSURE: 81 MMHG | TEMPERATURE: 97.8 F | HEIGHT: 65 IN | BODY MASS INDEX: 32.65 KG/M2

## 2023-08-16 LAB
ANION GAP BLD CALC-SCNC: 11 MMOL/L (ref 10–20)
CA-I BLD-MCNC: 1 MMOL/L (ref 1.12–1.32)
CHLORIDE BLD-SCNC: 95 MMOL/L (ref 98–107)
CO2 BLD-SCNC: 31.9 MMOL/L (ref 21–32)
CREAT BLD-MCNC: 5.73 MG/DL (ref 0.6–1.3)
GLUCOSE BLD-MCNC: 165 MG/DL (ref 65–100)
POTASSIUM BLD-SCNC: 3.7 MMOL/L (ref 3.5–5.1)
SERVICE CMNT-IMP: ABNORMAL
SODIUM BLD-SCNC: 137 MMOL/L (ref 136–145)

## 2023-08-16 PROCEDURE — 3600007502: Performed by: INTERNAL MEDICINE

## 2023-08-16 PROCEDURE — 7100000010 HC PHASE II RECOVERY - FIRST 15 MIN: Performed by: INTERNAL MEDICINE

## 2023-08-16 PROCEDURE — 3700000001 HC ADD 15 MINUTES (ANESTHESIA): Performed by: INTERNAL MEDICINE

## 2023-08-16 PROCEDURE — 2580000003 HC RX 258: Performed by: NURSE ANESTHETIST, CERTIFIED REGISTERED

## 2023-08-16 PROCEDURE — 6360000002 HC RX W HCPCS: Performed by: NURSE ANESTHETIST, CERTIFIED REGISTERED

## 2023-08-16 PROCEDURE — 3700000000 HC ANESTHESIA ATTENDED CARE: Performed by: INTERNAL MEDICINE

## 2023-08-16 PROCEDURE — 80047 BASIC METABLC PNL IONIZED CA: CPT

## 2023-08-16 PROCEDURE — 2500000003 HC RX 250 WO HCPCS: Performed by: NURSE ANESTHETIST, CERTIFIED REGISTERED

## 2023-08-16 PROCEDURE — 88342 IMHCHEM/IMCYTCHM 1ST ANTB: CPT

## 2023-08-16 PROCEDURE — 3600007512: Performed by: INTERNAL MEDICINE

## 2023-08-16 PROCEDURE — 2709999900 HC NON-CHARGEABLE SUPPLY: Performed by: INTERNAL MEDICINE

## 2023-08-16 PROCEDURE — 88305 TISSUE EXAM BY PATHOLOGIST: CPT

## 2023-08-16 RX ORDER — EPHEDRINE SULFATE/0.9% NACL/PF 50 MG/5 ML
SYRINGE (ML) INTRAVENOUS PRN
Status: DISCONTINUED | OUTPATIENT
Start: 2023-08-16 | End: 2023-08-16 | Stop reason: SDUPTHER

## 2023-08-16 RX ORDER — GLYCOPYRROLATE 0.2 MG/ML
INJECTION INTRAMUSCULAR; INTRAVENOUS PRN
Status: DISCONTINUED | OUTPATIENT
Start: 2023-08-16 | End: 2023-08-16 | Stop reason: SDUPTHER

## 2023-08-16 RX ORDER — SODIUM CHLORIDE 9 MG/ML
25 INJECTION, SOLUTION INTRAVENOUS PRN
Status: DISCONTINUED | OUTPATIENT
Start: 2023-08-16 | End: 2023-08-16 | Stop reason: HOSPADM

## 2023-08-16 RX ORDER — LIDOCAINE HYDROCHLORIDE 20 MG/ML
INJECTION, SOLUTION EPIDURAL; INFILTRATION; INTRACAUDAL; PERINEURAL PRN
Status: DISCONTINUED | OUTPATIENT
Start: 2023-08-16 | End: 2023-08-16 | Stop reason: SDUPTHER

## 2023-08-16 RX ORDER — SODIUM CHLORIDE 0.9 % (FLUSH) 0.9 %
5-40 SYRINGE (ML) INJECTION PRN
Status: DISCONTINUED | OUTPATIENT
Start: 2023-08-16 | End: 2023-08-16 | Stop reason: HOSPADM

## 2023-08-16 RX ORDER — SODIUM CHLORIDE 9 MG/ML
INJECTION, SOLUTION INTRAVENOUS CONTINUOUS PRN
Status: DISCONTINUED | OUTPATIENT
Start: 2023-08-16 | End: 2023-08-16 | Stop reason: SDUPTHER

## 2023-08-16 RX ORDER — ESMOLOL HYDROCHLORIDE 10 MG/ML
INJECTION INTRAVENOUS PRN
Status: DISCONTINUED | OUTPATIENT
Start: 2023-08-16 | End: 2023-08-16 | Stop reason: SDUPTHER

## 2023-08-16 RX ORDER — SODIUM CHLORIDE 0.9 % (FLUSH) 0.9 %
5-40 SYRINGE (ML) INJECTION EVERY 12 HOURS SCHEDULED
Status: DISCONTINUED | OUTPATIENT
Start: 2023-08-16 | End: 2023-08-16 | Stop reason: HOSPADM

## 2023-08-16 RX ORDER — SODIUM CHLORIDE, SODIUM LACTATE, POTASSIUM CHLORIDE, CALCIUM CHLORIDE 600; 310; 30; 20 MG/100ML; MG/100ML; MG/100ML; MG/100ML
INJECTION, SOLUTION INTRAVENOUS CONTINUOUS PRN
Status: DISCONTINUED | OUTPATIENT
Start: 2023-08-16 | End: 2023-08-16 | Stop reason: SDUPTHER

## 2023-08-16 RX ORDER — ATROPINE SULFATE 0.4 MG/ML
INJECTION, SOLUTION INTRAMUSCULAR; INTRAVENOUS; SUBCUTANEOUS PRN
Status: DISCONTINUED | OUTPATIENT
Start: 2023-08-16 | End: 2023-08-16 | Stop reason: SDUPTHER

## 2023-08-16 RX ADMIN — SODIUM CHLORIDE: 9 INJECTION, SOLUTION INTRAVENOUS at 12:00

## 2023-08-16 RX ADMIN — SODIUM CHLORIDE, POTASSIUM CHLORIDE, SODIUM LACTATE AND CALCIUM CHLORIDE: 600; 310; 30; 20 INJECTION, SOLUTION INTRAVENOUS at 12:04

## 2023-08-16 RX ADMIN — LIDOCAINE HYDROCHLORIDE 100 MG: 20 INJECTION, SOLUTION EPIDURAL; INFILTRATION; INTRACAUDAL; PERINEURAL at 12:17

## 2023-08-16 RX ADMIN — SODIUM CHLORIDE, POTASSIUM CHLORIDE, SODIUM LACTATE AND CALCIUM CHLORIDE: 600; 310; 30; 20 INJECTION, SOLUTION INTRAVENOUS at 12:55

## 2023-08-16 RX ADMIN — ESMOLOL HYDROCHLORIDE 20 MG: 10 INJECTION, SOLUTION INTRAVENOUS at 12:16

## 2023-08-16 RX ADMIN — PROPOFOL 140 MG: 10 INJECTION, EMULSION INTRAVENOUS at 12:35

## 2023-08-16 ASSESSMENT — ENCOUNTER SYMPTOMS: SHORTNESS OF BREATH: 1

## 2023-08-16 ASSESSMENT — PAIN - FUNCTIONAL ASSESSMENT: PAIN_FUNCTIONAL_ASSESSMENT: 0-10

## 2023-08-16 NOTE — OP NOTE
NAME:  Codie Byrd   :   1955   MRN:   [id number]     Date/Time:  2023 12:36 PM    Esophagogastroduodenoscopy (EGD) Procedure Note    : Linda Lance MD    Staff: Circulator: Shilo Rao RN  Endoscopy Technician: Eusebio Ibarra    Referring Provider:  Ana Barr MD    Anethesia/Sedation:  MAC anesthesia Propofol    Procedure Details   After infomed consent was obtained for the procedure, with all risks and benefits of procedure explained the patient was taken to the endoscopy suite and placed in the left lateral decubitus position. Following sequential administration of sedation as per above, the gastroscope was inserted into the mouth and advanced under direct vision to second portion of the duodenum. A careful inspection was made as the gastroscope was withdrawn, including a retroflexed view of the proximal stomach; findings and interventions are described below. Findings:  Esophagus:normal. EGJ at 40cm and regular. Gastric retroflexion without mass. Stomach: After lavage and examination with NBI imaging. Normal body and cardia. Antrum with moderate erythema, congestion, and a few erosions. Incisura with multiple large clean-based ulcers 6-10mm. Extensive mapping with [facility] protocol of entire stomach biopsies obtained cold forceps with minimal bleeding antrum, incisura, body, cardia, to r/o H pylori, GIM, dysplasia, malignancy. >10 biopsies taken of the incisura alone. Duodenum/jejunum:normal           Complications: None. EBL:  minimal    Impression:    See findings above    Recommendations:   - If biopsies obtained, await pathology. You should receive a letter within 2 weeks. - Resume normal medications.   - discussed w/ Aki    Discharge disposition:  Home in the company of  when able to ambulate    Linda Lance MD

## 2023-08-16 NOTE — DISCHARGE INSTRUCTIONS
Juan Barahona  [id number]  1955    It was my pleasure seeing you for your procedure. You will also receive a summary report with the findings from this procedure and any further recommendations. If you had polyps removed or biopsies taken during your procedure, you will receive a separate letter from me within the next 2 weeks. If you don't receive this letter or if you have any questions, please call my office [phone]. Please take note of the post procedure instructions listed below. Best Wishes,    Dr. Vicente Nguyen    These instructions give you information on caring for yourself after your procedure. Call your doctor if you have any problems or questions after your procedure. HOME CARE  Walk if you have belly cramping or gas. Walking will help get rid of the air and reduce the bloated feeling in your belly (abdomen). Your IV site (where you received drugs) may be tender to touch. Place warm towels on the site; keep your arm up on two pillows if you have any swelling or soreness in the area. You may shower. ACTIVITY:  Take frequent rest periods and move at a slower pace for the next 24 hours. .  You may resume your regular activity tomorrow if you are feeling back to normal.  Do not drive or ride a bicycle for at least 24 hours (because of the medicine (anesthesia) used during the test). Do not sign any important legal documents or use or operate any machinery for 24 hours  Do not take sleeping medicines/nerve drugs for 24 hours unless the doctor tells you. You can return to work/school tomorrow unless otherwise instructed. NUTRITION:  Drink plenty of fluids to keep your pee (urine) clear or pale yellow  Begin with a light meal and progress to your normal diet. Heavy or fried foods are harder to digest and may make you feel sick to your stomach (nauseated).   Once you are feeling back to normal, you may resume your normal diet as

## 2023-08-16 NOTE — PROGRESS NOTES
TRANSFER - IN REPORT:    Verbal report received from [address] [place] [address] Ave on [employer]  being received from [facility]   for routine progression of patient care      Report consisted of patient's Situation, Background, Assessment and   Recommendations(SBAR). Information from the following report(s) Nurse Handoff Report was reviewed with the receiving nurse. Opportunity for questions and clarification was provided. Assessment completed upon patient's arrival to unit and care assumed.

## 2023-08-17 NOTE — ANESTHESIA POSTPROCEDURE EVALUATION
Department of Anesthesiology  Postprocedure Note    Patient: Sarbjit Goldstein  MRN: [id number]  YOB: 1955  Date of evaluation: 8/17/2023      Procedure Summary     Date: 08/16/23 Room / Location: [facility] ENDO 02 / [facility] ENDOSCOPY    Anesthesia Start: 1204 Anesthesia Stop: 2584    Procedure: EGD BIOPSY (Upper GI Region) Diagnosis:       Gastric intestinal metaplasia with dysplasia      H. pylori infection      Multiple gastric ulcers      (Dyspepsia [R10.13])    Surgeons:  Veronica Espinoza MD Responsible Provider: Dixie Mcclellan MD    Anesthesia Type: MAC ASA Status: 3          Anesthesia Type: MAC    Tati Phase I: Tati Score: 10    Tati Phase II: Tati Score: 10      Anesthesia Post Evaluation    Patient location during evaluation: PACU  Patient participation: complete - patient participated  Level of consciousness: sleepy but conscious and responsive to verbal stimuli  Airway patency: patent  Nausea & Vomiting: no vomiting and no nausea  Complications: no  Cardiovascular status: blood pressure returned to baseline and hemodynamically stable  Respiratory status: acceptable  Hydration status: stable

## 2023-08-18 ENCOUNTER — APPOINTMENT (OUTPATIENT)
Facility: HOSPITAL | Age: 68
DRG: 853 | End: 2023-08-18
Payer: MEDICARE

## 2023-08-18 ENCOUNTER — HOSPITAL ENCOUNTER (INPATIENT)
Facility: HOSPITAL | Age: 68
LOS: 27 days | Discharge: HOME | DRG: 853 | End: 2023-09-14
Attending: EMERGENCY MEDICINE | Admitting: STUDENT IN AN ORGANIZED HEALTH CARE EDUCATION/TRAINING PROGRAM
Payer: MEDICARE

## 2023-08-18 DIAGNOSIS — R77.8 ELEVATED TROPONIN: ICD-10-CM

## 2023-08-18 DIAGNOSIS — I10 HYPERTENSION, UNSPECIFIED TYPE: ICD-10-CM

## 2023-08-18 DIAGNOSIS — R06.02 SHORTNESS OF BREATH: ICD-10-CM

## 2023-08-18 DIAGNOSIS — A41.9 SEPSIS WITH ACUTE ORGAN DYSFUNCTION WITHOUT SEPTIC SHOCK, DUE TO UNSPECIFIED ORGANISM, UNSPECIFIED TYPE (HCC): ICD-10-CM

## 2023-08-18 DIAGNOSIS — R65.20 SEPSIS WITH ACUTE ORGAN DYSFUNCTION WITHOUT SEPTIC SHOCK, DUE TO UNSPECIFIED ORGANISM, UNSPECIFIED TYPE (HCC): ICD-10-CM

## 2023-08-18 DIAGNOSIS — I25.10 ASHD (ARTERIOSCLEROTIC HEART DISEASE): ICD-10-CM

## 2023-08-18 DIAGNOSIS — Z95.1 S/P CABG X 3: ICD-10-CM

## 2023-08-18 DIAGNOSIS — K52.9 COLITIS: ICD-10-CM

## 2023-08-18 DIAGNOSIS — I21.4 NSTEMI (NON-ST ELEVATED MYOCARDIAL INFARCTION) (HCC): Primary | ICD-10-CM

## 2023-08-18 DIAGNOSIS — J90 PLEURAL EFFUSION: ICD-10-CM

## 2023-08-18 DIAGNOSIS — E87.20 LACTIC ACIDOSIS: ICD-10-CM

## 2023-08-18 DIAGNOSIS — R07.9 CHEST PAIN AT REST: ICD-10-CM

## 2023-08-18 DIAGNOSIS — R19.7 DIARRHEA, UNSPECIFIED TYPE: ICD-10-CM

## 2023-08-18 DIAGNOSIS — E87.6 HYPOKALEMIA: ICD-10-CM

## 2023-08-18 DIAGNOSIS — E78.00 HYPERCHOLESTEREMIA: ICD-10-CM

## 2023-08-18 DIAGNOSIS — R78.81 BACTEREMIA: ICD-10-CM

## 2023-08-18 DIAGNOSIS — I33.9 ACUTE AND SUBACUTE ENDOCARDITIS, UNSPECIFIED: ICD-10-CM

## 2023-08-18 LAB
ALBUMIN SERPL-MCNC: 2 G/DL (ref 3.5–5)
ALBUMIN SERPL-MCNC: 2.1 G/DL (ref 3.5–5)
ALBUMIN/GLOB SERPL: 0.4 (ref 1.1–2.2)
ALBUMIN/GLOB SERPL: 0.4 (ref 1.1–2.2)
ALP SERPL-CCNC: 106 U/L (ref 45–117)
ALP SERPL-CCNC: 111 U/L (ref 45–117)
ALT SERPL-CCNC: 10 U/L (ref 12–78)
ALT SERPL-CCNC: 8 U/L (ref 12–78)
ANION GAP SERPL CALC-SCNC: 13 MMOL/L (ref 5–15)
ANION GAP SERPL CALC-SCNC: 14 MMOL/L (ref 5–15)
AST SERPL-CCNC: 10 U/L (ref 15–37)
AST SERPL-CCNC: 14 U/L (ref 15–37)
BASOPHILS # BLD: 0 K/UL (ref 0–0.1)
BASOPHILS NFR BLD: 0 % (ref 0–1)
BILIRUB SERPL-MCNC: 0.3 MG/DL (ref 0.2–1)
BILIRUB SERPL-MCNC: 0.3 MG/DL (ref 0.2–1)
BUN SERPL-MCNC: 19 MG/DL (ref 6–20)
BUN SERPL-MCNC: 20 MG/DL (ref 6–20)
BUN/CREAT SERPL: 3 (ref 12–20)
BUN/CREAT SERPL: 3 (ref 12–20)
CALCIUM SERPL-MCNC: 8.1 MG/DL (ref 8.5–10.1)
CALCIUM SERPL-MCNC: 8.2 MG/DL (ref 8.5–10.1)
CHLORIDE SERPL-SCNC: 95 MMOL/L (ref 97–108)
CHLORIDE SERPL-SCNC: 95 MMOL/L (ref 97–108)
CO2 SERPL-SCNC: 25 MMOL/L (ref 21–32)
CO2 SERPL-SCNC: 28 MMOL/L (ref 21–32)
COMMENT:: NORMAL
COMMENT:: NORMAL
CREAT SERPL-MCNC: 5.69 MG/DL (ref 0.55–1.02)
CREAT SERPL-MCNC: 6 MG/DL (ref 0.55–1.02)
DIFFERENTIAL METHOD BLD: ABNORMAL
EOSINOPHIL # BLD: 0.2 K/UL (ref 0–0.4)
EOSINOPHIL NFR BLD: 2 % (ref 0–7)
ERYTHROCYTE [DISTWIDTH] IN BLOOD BY AUTOMATED COUNT: 18.8 % (ref 11.5–14.5)
EST. AVERAGE GLUCOSE BLD GHB EST-MCNC: 111 MG/DL
GLOBULIN SER CALC-MCNC: 5.2 G/DL (ref 2–4)
GLOBULIN SER CALC-MCNC: 5.3 G/DL (ref 2–4)
GLUCOSE BLD STRIP.AUTO-MCNC: 136 MG/DL (ref 65–117)
GLUCOSE SERPL-MCNC: 165 MG/DL (ref 65–100)
GLUCOSE SERPL-MCNC: 188 MG/DL (ref 65–100)
HBA1C MFR BLD: 5.5 % (ref 4–5.6)
HCT VFR BLD AUTO: 24.4 % (ref 35–47)
HGB BLD-MCNC: 7.4 G/DL (ref 11.5–16)
HISTORY CHECK: NORMAL
IMM GRANULOCYTES # BLD AUTO: 0.1 K/UL (ref 0–0.04)
IMM GRANULOCYTES NFR BLD AUTO: 1 % (ref 0–0.5)
LACTATE SERPL-SCNC: 5 MMOL/L (ref 0.4–2)
LACTATE SERPL-SCNC: 6 MMOL/L (ref 0.4–2)
LYMPHOCYTES # BLD: 1 K/UL (ref 0.8–3.5)
LYMPHOCYTES NFR BLD: 9 % (ref 12–49)
MAGNESIUM SERPL-MCNC: 1.6 MG/DL (ref 1.6–2.4)
MAGNESIUM SERPL-MCNC: 1.7 MG/DL (ref 1.6–2.4)
MCH RBC QN AUTO: 27.9 PG (ref 26–34)
MCHC RBC AUTO-ENTMCNC: 30.3 G/DL (ref 30–36.5)
MCV RBC AUTO: 92.1 FL (ref 80–99)
MONOCYTES # BLD: 0.5 K/UL (ref 0–1)
MONOCYTES NFR BLD: 4 % (ref 5–13)
NEUTS SEG # BLD: 9.5 K/UL (ref 1.8–8)
NEUTS SEG NFR BLD: 84 % (ref 32–75)
NRBC # BLD: 0 K/UL (ref 0–0.01)
NRBC BLD-RTO: 0 PER 100 WBC
NT PRO BNP: ABNORMAL PG/ML
PLATELET # BLD AUTO: 321 K/UL (ref 150–400)
PMV BLD AUTO: 9.5 FL (ref 8.9–12.9)
POTASSIUM SERPL-SCNC: 2.6 MMOL/L (ref 3.5–5.1)
POTASSIUM SERPL-SCNC: 2.7 MMOL/L (ref 3.5–5.1)
PROT SERPL-MCNC: 7.2 G/DL (ref 6.4–8.2)
PROT SERPL-MCNC: 7.4 G/DL (ref 6.4–8.2)
RBC # BLD AUTO: 2.65 M/UL (ref 3.8–5.2)
SERVICE CMNT-IMP: ABNORMAL
SODIUM SERPL-SCNC: 134 MMOL/L (ref 136–145)
SODIUM SERPL-SCNC: 136 MMOL/L (ref 136–145)
SPECIMEN HOLD: NORMAL
SPECIMEN HOLD: NORMAL
TROPONIN I SERPL HS-MCNC: 4843 NG/L (ref 0–51)
TROPONIN I SERPL HS-MCNC: 4855 NG/L (ref 0–51)
WBC # BLD AUTO: 11.2 K/UL (ref 3.6–11)

## 2023-08-18 PROCEDURE — 96365 THER/PROPH/DIAG IV INF INIT: CPT

## 2023-08-18 PROCEDURE — 82962 GLUCOSE BLOOD TEST: CPT

## 2023-08-18 PROCEDURE — 85025 COMPLETE CBC W/AUTO DIFF WBC: CPT

## 2023-08-18 PROCEDURE — 74176 CT ABD & PELVIS W/O CONTRAST: CPT

## 2023-08-18 PROCEDURE — 93005 ELECTROCARDIOGRAM TRACING: CPT

## 2023-08-18 PROCEDURE — 83880 ASSAY OF NATRIURETIC PEPTIDE: CPT

## 2023-08-18 PROCEDURE — 96375 TX/PRO/DX INJ NEW DRUG ADDON: CPT

## 2023-08-18 PROCEDURE — 1100000000 HC RM PRIVATE

## 2023-08-18 PROCEDURE — 87040 BLOOD CULTURE FOR BACTERIA: CPT

## 2023-08-18 PROCEDURE — 96367 TX/PROPH/DG ADDL SEQ IV INF: CPT

## 2023-08-18 PROCEDURE — 83605 ASSAY OF LACTIC ACID: CPT

## 2023-08-18 PROCEDURE — 2580000003 HC RX 258

## 2023-08-18 PROCEDURE — 93005 ELECTROCARDIOGRAM TRACING: CPT | Performed by: INTERNAL MEDICINE

## 2023-08-18 PROCEDURE — 71045 X-RAY EXAM CHEST 1 VIEW: CPT

## 2023-08-18 PROCEDURE — 6370000000 HC RX 637 (ALT 250 FOR IP): Performed by: STUDENT IN AN ORGANIZED HEALTH CARE EDUCATION/TRAINING PROGRAM

## 2023-08-18 PROCEDURE — 84484 ASSAY OF TROPONIN QUANT: CPT

## 2023-08-18 PROCEDURE — 83036 HEMOGLOBIN GLYCOSYLATED A1C: CPT

## 2023-08-18 PROCEDURE — 6360000002 HC RX W HCPCS

## 2023-08-18 PROCEDURE — 36415 COLL VENOUS BLD VENIPUNCTURE: CPT

## 2023-08-18 PROCEDURE — 6360000002 HC RX W HCPCS: Performed by: STUDENT IN AN ORGANIZED HEALTH CARE EDUCATION/TRAINING PROGRAM

## 2023-08-18 PROCEDURE — 80053 COMPREHEN METABOLIC PANEL: CPT

## 2023-08-18 PROCEDURE — 99285 EMERGENCY DEPT VISIT HI MDM: CPT

## 2023-08-18 PROCEDURE — 2060000000 HC ICU INTERMEDIATE R&B

## 2023-08-18 PROCEDURE — 96366 THER/PROPH/DIAG IV INF ADDON: CPT

## 2023-08-18 PROCEDURE — 83735 ASSAY OF MAGNESIUM: CPT

## 2023-08-18 PROCEDURE — 2580000003 HC RX 258: Performed by: STUDENT IN AN ORGANIZED HEALTH CARE EDUCATION/TRAINING PROGRAM

## 2023-08-18 RX ORDER — SODIUM CHLORIDE, SODIUM LACTATE, CALCIUM CHLORIDE, MAGNESIUM CHLORIDE AND DEXTROSE 2.5; 538; 448; 18.3; 5.08 G/100ML; MG/100ML; MG/100ML; MG/100ML; MG/100ML
6000 INJECTION, SOLUTION INTRAPERITONEAL CONTINUOUS
Status: DISCONTINUED | OUTPATIENT
Start: 2023-08-18 | End: 2023-08-19

## 2023-08-18 RX ORDER — ONDANSETRON 4 MG/1
4 TABLET, ORALLY DISINTEGRATING ORAL EVERY 8 HOURS PRN
Status: DISCONTINUED | OUTPATIENT
Start: 2023-08-18 | End: 2023-09-07

## 2023-08-18 RX ORDER — FLUCONAZOLE 100 MG/1
100 TABLET ORAL DAILY
Status: DISCONTINUED | OUTPATIENT
Start: 2023-08-19 | End: 2023-08-23

## 2023-08-18 RX ORDER — HYDRALAZINE HYDROCHLORIDE 25 MG/1
50 TABLET, FILM COATED ORAL 3 TIMES DAILY
Status: DISCONTINUED | OUTPATIENT
Start: 2023-08-18 | End: 2023-09-07

## 2023-08-18 RX ORDER — ONDANSETRON 2 MG/ML
4 INJECTION INTRAMUSCULAR; INTRAVENOUS EVERY 6 HOURS PRN
Status: DISCONTINUED | OUTPATIENT
Start: 2023-08-18 | End: 2023-09-07

## 2023-08-18 RX ORDER — SODIUM CHLORIDE 0.9 % (FLUSH) 0.9 %
5-40 SYRINGE (ML) INJECTION PRN
Status: DISCONTINUED | OUTPATIENT
Start: 2023-08-18 | End: 2023-09-07

## 2023-08-18 RX ORDER — MONTELUKAST SODIUM 10 MG/1
10 TABLET ORAL NIGHTLY
Status: DISCONTINUED | OUTPATIENT
Start: 2023-08-18 | End: 2023-09-07

## 2023-08-18 RX ORDER — INSULIN LISPRO 100 [IU]/ML
0-4 INJECTION, SOLUTION INTRAVENOUS; SUBCUTANEOUS NIGHTLY
Status: DISCONTINUED | OUTPATIENT
Start: 2023-08-18 | End: 2023-08-19

## 2023-08-18 RX ORDER — ACETAMINOPHEN 650 MG/1
650 SUPPOSITORY RECTAL EVERY 6 HOURS PRN
Status: DISCONTINUED | OUTPATIENT
Start: 2023-08-18 | End: 2023-09-07

## 2023-08-18 RX ORDER — DEXTROSE MONOHYDRATE 100 MG/ML
INJECTION, SOLUTION INTRAVENOUS CONTINUOUS PRN
Status: DISCONTINUED | OUTPATIENT
Start: 2023-08-18 | End: 2023-09-07

## 2023-08-18 RX ORDER — DULOXETIN HYDROCHLORIDE 20 MG/1
20 CAPSULE, DELAYED RELEASE ORAL 2 TIMES DAILY
Status: DISCONTINUED | OUTPATIENT
Start: 2023-08-18 | End: 2023-09-14 | Stop reason: HOSPADM

## 2023-08-18 RX ORDER — ACETAMINOPHEN 325 MG/1
650 TABLET ORAL EVERY 6 HOURS PRN
Status: DISCONTINUED | OUTPATIENT
Start: 2023-08-18 | End: 2023-09-07

## 2023-08-18 RX ORDER — FOLIC ACID 1 MG/1
1 TABLET ORAL DAILY
Status: DISCONTINUED | OUTPATIENT
Start: 2023-08-19 | End: 2023-09-07

## 2023-08-18 RX ORDER — AMLODIPINE BESYLATE 5 MG/1
10 TABLET ORAL DAILY
Status: DISCONTINUED | OUTPATIENT
Start: 2023-08-19 | End: 2023-09-07

## 2023-08-18 RX ORDER — GENTAMICIN SULFATE 1 MG/G
CREAM TOPICAL PRN
Status: DISCONTINUED | OUTPATIENT
Start: 2023-08-18 | End: 2023-09-14 | Stop reason: HOSPADM

## 2023-08-18 RX ORDER — POTASSIUM CHLORIDE 750 MG/1
40 TABLET, FILM COATED, EXTENDED RELEASE ORAL ONCE
Status: COMPLETED | OUTPATIENT
Start: 2023-08-18 | End: 2023-08-18

## 2023-08-18 RX ORDER — SODIUM CHLORIDE 9 MG/ML
INJECTION, SOLUTION INTRAVENOUS PRN
Status: DISCONTINUED | OUTPATIENT
Start: 2023-08-18 | End: 2023-09-07

## 2023-08-18 RX ORDER — POTASSIUM CHLORIDE 7.45 MG/ML
10 INJECTION INTRAVENOUS ONCE
Status: COMPLETED | OUTPATIENT
Start: 2023-08-18 | End: 2023-08-18

## 2023-08-18 RX ORDER — ROSUVASTATIN CALCIUM 40 MG/1
40 TABLET, COATED ORAL DAILY
Status: DISCONTINUED | OUTPATIENT
Start: 2023-08-19 | End: 2023-09-07

## 2023-08-18 RX ORDER — SODIUM CHLORIDE 0.9 % (FLUSH) 0.9 %
5-40 SYRINGE (ML) INJECTION EVERY 12 HOURS SCHEDULED
Status: DISCONTINUED | OUTPATIENT
Start: 2023-08-18 | End: 2023-09-07

## 2023-08-18 RX ORDER — POLYETHYLENE GLYCOL 3350 17 G/17G
17 POWDER, FOR SOLUTION ORAL DAILY PRN
Status: DISCONTINUED | OUTPATIENT
Start: 2023-08-18 | End: 2023-09-07

## 2023-08-18 RX ORDER — CARVEDILOL 12.5 MG/1
25 TABLET ORAL 2 TIMES DAILY WITH MEALS
Status: DISCONTINUED | OUTPATIENT
Start: 2023-08-19 | End: 2023-08-19 | Stop reason: DRUGHIGH

## 2023-08-18 RX ORDER — INSULIN LISPRO 100 [IU]/ML
0-4 INJECTION, SOLUTION INTRAVENOUS; SUBCUTANEOUS
Status: DISCONTINUED | OUTPATIENT
Start: 2023-08-19 | End: 2023-08-19

## 2023-08-18 RX ORDER — METRONIDAZOLE 500 MG/100ML
500 INJECTION, SOLUTION INTRAVENOUS ONCE
Status: COMPLETED | OUTPATIENT
Start: 2023-08-18 | End: 2023-08-18

## 2023-08-18 RX ADMIN — CEFEPIME 2000 MG: 2 INJECTION, POWDER, FOR SOLUTION INTRAVENOUS at 20:43

## 2023-08-18 RX ADMIN — PIPERACILLIN AND TAZOBACTAM 4500 MG: 4; .5 INJECTION, POWDER, LYOPHILIZED, FOR SOLUTION INTRAVENOUS at 23:44

## 2023-08-18 RX ADMIN — DULOXETINE 20 MG: 20 CAPSULE, DELAYED RELEASE ORAL at 23:51

## 2023-08-18 RX ADMIN — METRONIDAZOLE 500 MG: 500 INJECTION, SOLUTION INTRAVENOUS at 20:54

## 2023-08-18 RX ADMIN — MONTELUKAST 10 MG: 10 TABLET, FILM COATED ORAL at 23:44

## 2023-08-18 RX ADMIN — Medication 10 ML: at 22:50

## 2023-08-18 RX ADMIN — POTASSIUM CHLORIDE 10 MEQ: 7.46 INJECTION, SOLUTION INTRAVENOUS at 21:06

## 2023-08-18 RX ADMIN — POTASSIUM CHLORIDE 40 MEQ: 750 TABLET, FILM COATED, EXTENDED RELEASE ORAL at 23:44

## 2023-08-18 RX ADMIN — HYDRALAZINE HYDROCHLORIDE 50 MG: 50 TABLET, FILM COATED ORAL at 23:44

## 2023-08-18 ASSESSMENT — ENCOUNTER SYMPTOMS
COUGH: 0
SHORTNESS OF BREATH: 1
NAUSEA: 0
VOMITING: 0
ABDOMINAL PAIN: 1
DIARRHEA: 1
CHEST TIGHTNESS: 0

## 2023-08-18 ASSESSMENT — PAIN SCALES - GENERAL: PAINLEVEL_OUTOF10: 9

## 2023-08-18 ASSESSMENT — PAIN DESCRIPTION - LOCATION: LOCATION: ABDOMEN;BACK

## 2023-08-18 NOTE — ED TRIAGE NOTES
Patient arrived wheelchair w/ c/o diarrhea, dark tarry stools, SOB, and thoracic back pain. Patient had a endoscopy Tuesday finding of ulcers.

## 2023-08-19 PROBLEM — M79.604 RIGHT LEG PAIN: Status: RESOLVED | Noted: 2023-03-03 | Resolved: 2023-08-19

## 2023-08-19 LAB
ACCESSION NUMBER, LLC1M: NORMAL
ACINETOBACTER CALCOAC BAUMANNII COMPLEX BY PCR: NOT DETECTED
ALBUMIN SERPL-MCNC: 1.7 G/DL (ref 3.5–5)
ALBUMIN SERPL-MCNC: 1.7 G/DL (ref 3.5–5)
ALBUMIN/GLOB SERPL: 0.4 (ref 1.1–2.2)
ALBUMIN/GLOB SERPL: 0.4 (ref 1.1–2.2)
ALP SERPL-CCNC: 91 U/L (ref 45–117)
ALP SERPL-CCNC: 98 U/L (ref 45–117)
ALT SERPL-CCNC: 8 U/L (ref 12–78)
ALT SERPL-CCNC: 8 U/L (ref 12–78)
ANION GAP SERPL CALC-SCNC: 8 MMOL/L (ref 5–15)
ANION GAP SERPL CALC-SCNC: 9 MMOL/L (ref 5–15)
APPEARANCE FLD: CLEAR
AST SERPL-CCNC: 10 U/L (ref 15–37)
AST SERPL-CCNC: 12 U/L (ref 15–37)
B FRAGILIS DNA BLD POS QL NAA+NON-PROBE: NOT DETECTED
BASOPHILS # BLD: 0.1 K/UL (ref 0–0.1)
BASOPHILS NFR BLD: 1 % (ref 0–1)
BILIRUB SERPL-MCNC: 0.2 MG/DL (ref 0.2–1)
BILIRUB SERPL-MCNC: 0.2 MG/DL (ref 0.2–1)
BIOFIRE TEST COMMENT: NORMAL
BUN SERPL-MCNC: 20 MG/DL (ref 6–20)
BUN SERPL-MCNC: 20 MG/DL (ref 6–20)
BUN/CREAT SERPL: 3 (ref 12–20)
BUN/CREAT SERPL: 4 (ref 12–20)
C ALBICANS DNA BLD POS QL NAA+NON-PROBE: NOT DETECTED
C AURIS DNA BLD POS QL NAA+NON-PROBE: NOT DETECTED
C GATTII+NEOFOR DNA BLD POS QL NAA+N-PRB: NOT DETECTED
C GLABRATA DNA BLD POS QL NAA+NON-PROBE: NOT DETECTED
C KRUSEI DNA BLD POS QL NAA+NON-PROBE: NOT DETECTED
C PARAP DNA BLD POS QL NAA+NON-PROBE: NOT DETECTED
C TROPICLS DNA BLD POS QL NAA+NON-PROBE: NOT DETECTED
CALCIUM SERPL-MCNC: 7.5 MG/DL (ref 8.5–10.1)
CALCIUM SERPL-MCNC: 7.7 MG/DL (ref 8.5–10.1)
CHLORIDE SERPL-SCNC: 100 MMOL/L (ref 97–108)
CHLORIDE SERPL-SCNC: 99 MMOL/L (ref 97–108)
CO2 SERPL-SCNC: 28 MMOL/L (ref 21–32)
CO2 SERPL-SCNC: 29 MMOL/L (ref 21–32)
COLOR FLD: ABNORMAL
COMMENT:: NORMAL
COMMENT:: NORMAL
CREAT SERPL-MCNC: 5.55 MG/DL (ref 0.55–1.02)
CREAT SERPL-MCNC: 5.86 MG/DL (ref 0.55–1.02)
DIFFERENTIAL METHOD BLD: ABNORMAL
E CLOAC COMP DNA BLD POS NAA+NON-PROBE: NOT DETECTED
E COLI DNA BLD POS QL NAA+NON-PROBE: NOT DETECTED
E FAECALIS DNA BLD POS QL NAA+NON-PROBE: NOT DETECTED
E FAECIUM DNA BLD POS QL NAA+NON-PROBE: NOT DETECTED
ENTEROBACTERALES DNA BLD POS NAA+N-PRB: NOT DETECTED
EOSINOPHIL # BLD: 0.4 K/UL (ref 0–0.4)
EOSINOPHIL NFR BLD: 4 % (ref 0–7)
ERYTHROCYTE [DISTWIDTH] IN BLOOD BY AUTOMATED COUNT: 19.1 % (ref 11.5–14.5)
GLOBULIN SER CALC-MCNC: 4.6 G/DL (ref 2–4)
GLOBULIN SER CALC-MCNC: 4.6 G/DL (ref 2–4)
GLUCOSE BLD STRIP.AUTO-MCNC: 222 MG/DL (ref 65–117)
GLUCOSE BLD STRIP.AUTO-MCNC: 233 MG/DL (ref 65–117)
GLUCOSE SERPL-MCNC: 198 MG/DL (ref 65–100)
GLUCOSE SERPL-MCNC: 208 MG/DL (ref 65–100)
GP B STREP DNA BLD POS QL NAA+NON-PROBE: NOT DETECTED
HAEM INFLU DNA BLD POS QL NAA+NON-PROBE: NOT DETECTED
HCT VFR BLD AUTO: 20.9 % (ref 35–47)
HEMOCCULT STL QL: POSITIVE
HGB BLD-MCNC: 6.3 G/DL (ref 11.5–16)
IMM GRANULOCYTES # BLD AUTO: 0 K/UL (ref 0–0.04)
IMM GRANULOCYTES NFR BLD AUTO: 0 % (ref 0–0.5)
K OXYTOCA DNA BLD POS QL NAA+NON-PROBE: NOT DETECTED
KLEBSIELLA SP DNA BLD POS QL NAA+NON-PRB: NOT DETECTED
KLEBSIELLA SP DNA BLD POS QL NAA+NON-PRB: NOT DETECTED
L MONOCYTOG DNA BLD POS QL NAA+NON-PROBE: NOT DETECTED
LACTATE SERPL-SCNC: 3.6 MMOL/L (ref 0.4–2)
LACTATE SERPL-SCNC: 3.8 MMOL/L (ref 0.4–2)
LACTATE SERPL-SCNC: 4.1 MMOL/L (ref 0.4–2)
LYMPHOCYTES # BLD: 0.8 K/UL (ref 0.8–3.5)
LYMPHOCYTES NFR BLD: 8 % (ref 12–49)
LYMPHOCYTES NFR FLD: 14 %
MAGNESIUM SERPL-MCNC: 1.6 MG/DL (ref 1.6–2.4)
MAGNESIUM SERPL-MCNC: 1.8 MG/DL (ref 1.6–2.4)
MCH RBC QN AUTO: 27.9 PG (ref 26–34)
MCHC RBC AUTO-ENTMCNC: 30.1 G/DL (ref 30–36.5)
MCV RBC AUTO: 92.5 FL (ref 80–99)
MONOCYTES # BLD: 0.5 K/UL (ref 0–1)
MONOCYTES NFR BLD: 5 % (ref 5–13)
MONOS+MACROS NFR FLD: 69 %
N MEN DNA BLD POS QL NAA+NON-PROBE: NOT DETECTED
NEUTROPHILS NFR FLD: 17 %
NEUTS SEG # BLD: 8.5 K/UL (ref 1.8–8)
NEUTS SEG NFR BLD: 82 % (ref 32–75)
NRBC # BLD: 0 K/UL (ref 0–0.01)
NRBC BLD-RTO: 0 PER 100 WBC
NUC CELL # FLD: 73 /CU MM
P AERUGINOSA DNA BLD POS NAA+NON-PROBE: NOT DETECTED
PHOSPHATE SERPL-MCNC: 3.7 MG/DL (ref 2.6–4.7)
PLATELET # BLD AUTO: 293 K/UL (ref 150–400)
PMV BLD AUTO: 9.9 FL (ref 8.9–12.9)
POTASSIUM SERPL-SCNC: 2.9 MMOL/L (ref 3.5–5.1)
POTASSIUM SERPL-SCNC: 3 MMOL/L (ref 3.5–5.1)
PROT SERPL-MCNC: 6.3 G/DL (ref 6.4–8.2)
PROT SERPL-MCNC: 6.3 G/DL (ref 6.4–8.2)
PROTEUS SP DNA BLD POS QL NAA+NON-PROBE: NOT DETECTED
RBC # BLD AUTO: 2.26 M/UL (ref 3.8–5.2)
RBC # FLD: 6 /CU MM
RBC MORPH BLD: ABNORMAL
RBC MORPH BLD: ABNORMAL
RESISTANT GENE TARGETS: NORMAL
S AUREUS DNA BLD POS QL NAA+NON-PROBE: NOT DETECTED
S AUREUS+CONS DNA BLD POS NAA+NON-PROBE: NOT DETECTED
S EPIDERMIDIS DNA BLD POS QL NAA+NON-PRB: NOT DETECTED
S LUGDUNENSIS DNA BLD POS QL NAA+NON-PRB: NOT DETECTED
S MALTOPHILIA DNA BLD POS QL NAA+NON-PRB: NOT DETECTED
S MARCESCENS DNA BLD POS NAA+NON-PROBE: NOT DETECTED
S PNEUM DNA BLD POS QL NAA+NON-PROBE: NOT DETECTED
S PYO DNA BLD POS QL NAA+NON-PROBE: NOT DETECTED
SALMONELLA DNA BLD POS QL NAA+NON-PROBE: NOT DETECTED
SERVICE CMNT-IMP: ABNORMAL
SERVICE CMNT-IMP: ABNORMAL
SODIUM SERPL-SCNC: 136 MMOL/L (ref 136–145)
SODIUM SERPL-SCNC: 137 MMOL/L (ref 136–145)
SPECIMEN HOLD: NORMAL
SPECIMEN HOLD: NORMAL
SPECIMEN SOURCE FLD: ABNORMAL
STREPTOCOCCUS DNA BLD POS NAA+NON-PROBE: NOT DETECTED
WBC # BLD AUTO: 10.3 K/UL (ref 3.6–11)

## 2023-08-19 PROCEDURE — 83735 ASSAY OF MAGNESIUM: CPT

## 2023-08-19 PROCEDURE — 80053 COMPREHEN METABOLIC PANEL: CPT

## 2023-08-19 PROCEDURE — 6360000002 HC RX W HCPCS: Performed by: INTERNAL MEDICINE

## 2023-08-19 PROCEDURE — 86850 RBC ANTIBODY SCREEN: CPT

## 2023-08-19 PROCEDURE — 87040 BLOOD CULTURE FOR BACTERIA: CPT

## 2023-08-19 PROCEDURE — 86900 BLOOD TYPING SEROLOGIC ABO: CPT

## 2023-08-19 PROCEDURE — 6370000000 HC RX 637 (ALT 250 FOR IP): Performed by: STUDENT IN AN ORGANIZED HEALTH CARE EDUCATION/TRAINING PROGRAM

## 2023-08-19 PROCEDURE — 2500000003 HC RX 250 WO HCPCS: Performed by: NURSE PRACTITIONER

## 2023-08-19 PROCEDURE — 82962 GLUCOSE BLOOD TEST: CPT

## 2023-08-19 PROCEDURE — 6370000000 HC RX 637 (ALT 250 FOR IP): Performed by: NURSE PRACTITIONER

## 2023-08-19 PROCEDURE — 2580000003 HC RX 258: Performed by: INTERNAL MEDICINE

## 2023-08-19 PROCEDURE — 86923 COMPATIBILITY TEST ELECTRIC: CPT

## 2023-08-19 PROCEDURE — 90945 DIALYSIS ONE EVALUATION: CPT

## 2023-08-19 PROCEDURE — 6360000002 HC RX W HCPCS: Performed by: NURSE PRACTITIONER

## 2023-08-19 PROCEDURE — 83605 ASSAY OF LACTIC ACID: CPT

## 2023-08-19 PROCEDURE — 87205 SMEAR GRAM STAIN: CPT

## 2023-08-19 PROCEDURE — 87506 IADNA-DNA/RNA PROBE TQ 6-11: CPT

## 2023-08-19 PROCEDURE — 89050 BODY FLUID CELL COUNT: CPT

## 2023-08-19 PROCEDURE — 2580000003 HC RX 258: Performed by: NURSE PRACTITIONER

## 2023-08-19 PROCEDURE — 36415 COLL VENOUS BLD VENIPUNCTURE: CPT

## 2023-08-19 PROCEDURE — 6370000000 HC RX 637 (ALT 250 FOR IP): Performed by: INTERNAL MEDICINE

## 2023-08-19 PROCEDURE — 87070 CULTURE OTHR SPECIMN AEROBIC: CPT

## 2023-08-19 PROCEDURE — 87150 DNA/RNA AMPLIFIED PROBE: CPT

## 2023-08-19 PROCEDURE — 86901 BLOOD TYPING SEROLOGIC RH(D): CPT

## 2023-08-19 PROCEDURE — 2500000003 HC RX 250 WO HCPCS: Performed by: INTERNAL MEDICINE

## 2023-08-19 PROCEDURE — 86078 PHYS BLOOD BANK SERV REACTJ: CPT

## 2023-08-19 PROCEDURE — 2580000003 HC RX 258: Performed by: STUDENT IN AN ORGANIZED HEALTH CARE EDUCATION/TRAINING PROGRAM

## 2023-08-19 PROCEDURE — 84145 PROCALCITONIN (PCT): CPT

## 2023-08-19 PROCEDURE — 84100 ASSAY OF PHOSPHORUS: CPT

## 2023-08-19 PROCEDURE — P9016 RBC LEUKOCYTES REDUCED: HCPCS

## 2023-08-19 PROCEDURE — 3E1M39Z IRRIGATION OF PERITONEAL CAVITY USING DIALYSATE, PERCUTANEOUS APPROACH: ICD-10-PCS

## 2023-08-19 PROCEDURE — 85025 COMPLETE CBC W/AUTO DIFF WBC: CPT

## 2023-08-19 PROCEDURE — 82272 OCCULT BLD FECES 1-3 TESTS: CPT

## 2023-08-19 PROCEDURE — 2000000000 HC ICU R&B

## 2023-08-19 RX ORDER — MAGNESIUM SULFATE IN WATER 40 MG/ML
2000 INJECTION, SOLUTION INTRAVENOUS ONCE
Status: COMPLETED | OUTPATIENT
Start: 2023-08-19 | End: 2023-08-19

## 2023-08-19 RX ORDER — NOREPINEPHRINE BITARTRATE 0.06 MG/ML
.5-2 INJECTION, SOLUTION INTRAVENOUS CONTINUOUS
Status: ACTIVE | OUTPATIENT
Start: 2023-08-19 | End: 2023-08-20

## 2023-08-19 RX ORDER — 0.9 % SODIUM CHLORIDE 0.9 %
500 INTRAVENOUS SOLUTION INTRAVENOUS ONCE
Status: COMPLETED | OUTPATIENT
Start: 2023-08-19 | End: 2023-08-19

## 2023-08-19 RX ORDER — EPINEPHRINE IN SOD CHLOR,ISO 1 MG/10 ML
1 SYRINGE (ML) INTRAVENOUS ONCE
Status: COMPLETED | OUTPATIENT
Start: 2023-08-19 | End: 2023-08-19

## 2023-08-19 RX ORDER — INSULIN LISPRO 100 [IU]/ML
0-8 INJECTION, SOLUTION INTRAVENOUS; SUBCUTANEOUS
Status: DISCONTINUED | OUTPATIENT
Start: 2023-08-20 | End: 2023-09-07

## 2023-08-19 RX ORDER — INSULIN LISPRO 100 [IU]/ML
0-4 INJECTION, SOLUTION INTRAVENOUS; SUBCUTANEOUS NIGHTLY
Status: DISCONTINUED | OUTPATIENT
Start: 2023-08-19 | End: 2023-09-07

## 2023-08-19 RX ORDER — HYDROMORPHONE HYDROCHLORIDE 1 MG/ML
0.25 INJECTION, SOLUTION INTRAMUSCULAR; INTRAVENOUS; SUBCUTANEOUS ONCE
Status: COMPLETED | OUTPATIENT
Start: 2023-08-19 | End: 2023-08-19

## 2023-08-19 RX ORDER — CARVEDILOL 12.5 MG/1
12.5 TABLET ORAL 2 TIMES DAILY WITH MEALS
Status: DISCONTINUED | OUTPATIENT
Start: 2023-08-19 | End: 2023-09-07

## 2023-08-19 RX ORDER — SODIUM CHLORIDE, SODIUM LACTATE, CALCIUM CHLORIDE, MAGNESIUM CHLORIDE AND DEXTROSE 2.5; 538; 448; 18.3; 5.08 G/100ML; MG/100ML; MG/100ML; MG/100ML; MG/100ML
6000 INJECTION, SOLUTION INTRAPERITONEAL
Status: DISCONTINUED | OUTPATIENT
Start: 2023-08-19 | End: 2023-09-11 | Stop reason: DRUGHIGH

## 2023-08-19 RX ORDER — OXYCODONE HYDROCHLORIDE 5 MG/1
5 TABLET ORAL EVERY 6 HOURS PRN
Status: DISCONTINUED | OUTPATIENT
Start: 2023-08-19 | End: 2023-09-07

## 2023-08-19 RX ORDER — GENTAMICIN SULFATE 1 MG/G
CREAM TOPICAL 3 TIMES DAILY
Status: DISCONTINUED | OUTPATIENT
Start: 2023-08-19 | End: 2023-08-19

## 2023-08-19 RX ORDER — SODIUM CHLORIDE 9 MG/ML
INJECTION, SOLUTION INTRAVENOUS PRN
Status: DISCONTINUED | OUTPATIENT
Start: 2023-08-19 | End: 2023-08-20

## 2023-08-19 RX ORDER — NOREPINEPHRINE BITARTRATE 0.06 MG/ML
1-20 INJECTION, SOLUTION INTRAVENOUS CONTINUOUS
Status: DISCONTINUED | OUTPATIENT
Start: 2023-08-19 | End: 2023-08-19

## 2023-08-19 RX ORDER — POTASSIUM CHLORIDE 750 MG/1
40 TABLET, FILM COATED, EXTENDED RELEASE ORAL 3 TIMES DAILY
Status: DISCONTINUED | OUTPATIENT
Start: 2023-08-19 | End: 2023-08-21

## 2023-08-19 RX ADMIN — POTASSIUM CHLORIDE 40 MEQ: 750 TABLET, FILM COATED, EXTENDED RELEASE ORAL at 15:59

## 2023-08-19 RX ADMIN — DULOXETINE 20 MG: 20 CAPSULE, DELAYED RELEASE ORAL at 23:01

## 2023-08-19 RX ADMIN — GENTAMICIN SULFATE: 1 CREAM TOPICAL at 06:56

## 2023-08-19 RX ADMIN — PIPERACILLIN AND TAZOBACTAM 3375 MG: 3; .375 INJECTION, POWDER, LYOPHILIZED, FOR SOLUTION INTRAVENOUS at 15:59

## 2023-08-19 RX ADMIN — SODIUM CHLORIDE, SODIUM LACTATE, CALCIUM CHLORIDE, MAGNESIUM CHLORIDE AND DEXTROSE 6000 ML: 2.5; 538; 448; 18.3; 5.08 INJECTION, SOLUTION INTRAPERITONEAL at 06:54

## 2023-08-19 RX ADMIN — CARVEDILOL 12.5 MG: 12.5 TABLET, FILM COATED ORAL at 11:48

## 2023-08-19 RX ADMIN — SODIUM CHLORIDE, SODIUM LACTATE, CALCIUM CHLORIDE, MAGNESIUM CHLORIDE AND DEXTROSE 6000 ML: 2.5; 538; 448; 18.3; 5.08 INJECTION, SOLUTION INTRAPERITONEAL at 06:57

## 2023-08-19 RX ADMIN — EPINEPHRINE 1 MG: 0.1 INJECTION INTRACARDIAC; INTRAVENOUS at 17:28

## 2023-08-19 RX ADMIN — NOREPINEPHRINE BITARTRATE 2.5 MCG/MIN: 1 INJECTION, SOLUTION, CONCENTRATE INTRAVENOUS at 19:50

## 2023-08-19 RX ADMIN — FLUCONAZOLE 100 MG: 100 TABLET ORAL at 11:26

## 2023-08-19 RX ADMIN — AMLODIPINE BESYLATE 10 MG: 5 TABLET ORAL at 11:27

## 2023-08-19 RX ADMIN — SODIUM CHLORIDE, SODIUM LACTATE, CALCIUM CHLORIDE, MAGNESIUM CHLORIDE AND DEXTROSE 6000 ML: 2.5; 538; 448; 18.3; 5.08 INJECTION, SOLUTION INTRAPERITONEAL at 06:56

## 2023-08-19 RX ADMIN — NOREPINEPHRINE BITARTRATE 5 MCG/MIN: 1 INJECTION, SOLUTION, CONCENTRATE INTRAVENOUS at 17:56

## 2023-08-19 RX ADMIN — MONTELUKAST 10 MG: 10 TABLET, FILM COATED ORAL at 23:00

## 2023-08-19 RX ADMIN — OXYCODONE HYDROCHLORIDE 5 MG: 5 TABLET ORAL at 01:52

## 2023-08-19 RX ADMIN — SODIUM CHLORIDE 500 ML: 9 INJECTION, SOLUTION INTRAVENOUS at 16:30

## 2023-08-19 RX ADMIN — HYDRALAZINE HYDROCHLORIDE 50 MG: 50 TABLET, FILM COATED ORAL at 11:28

## 2023-08-19 RX ADMIN — Medication 1 MG: at 11:27

## 2023-08-19 RX ADMIN — ROSUVASTATIN 40 MG: 40 TABLET, FILM COATED ORAL at 11:26

## 2023-08-19 RX ADMIN — VANCOMYCIN HYDROCHLORIDE 2250 MG: 10 INJECTION, POWDER, LYOPHILIZED, FOR SOLUTION INTRAVENOUS at 23:04

## 2023-08-19 RX ADMIN — HYDROMORPHONE HYDROCHLORIDE 0.25 MG: 1 INJECTION, SOLUTION INTRAMUSCULAR; INTRAVENOUS; SUBCUTANEOUS at 22:10

## 2023-08-19 RX ADMIN — MAGNESIUM SULFATE HEPTAHYDRATE 2000 MG: 40 INJECTION, SOLUTION INTRAVENOUS at 15:59

## 2023-08-19 RX ADMIN — Medication 1 UNITS: at 11:44

## 2023-08-19 RX ADMIN — Medication 10 ML: at 21:00

## 2023-08-19 ASSESSMENT — PAIN SCALES - GENERAL
PAINLEVEL_OUTOF10: 0
PAINLEVEL_OUTOF10: 10
PAINLEVEL_OUTOF10: 8

## 2023-08-19 ASSESSMENT — PAIN DESCRIPTION - LOCATION
LOCATION: BACK
LOCATION: BACK

## 2023-08-19 ASSESSMENT — PAIN DESCRIPTION - DESCRIPTORS: DESCRIPTORS: ACHING

## 2023-08-19 ASSESSMENT — PAIN DESCRIPTION - ORIENTATION
ORIENTATION: LOWER
ORIENTATION: MID

## 2023-08-19 NOTE — H&P
History & Physical    Primary Care Provider: Ana Peres MD  Source of Information: Patient and chart review    History of Presenting Illness:   Jose Dodd is a 70 y.o. female with past medical history of ESRD on HD, type 2 diabetes, hypertension, CAD, history of CHF who presents to hospital with complaints of diarrhea and shortness of breath. Has known history of gastric intestinal metaplasia with low-grade dysplasia and H. pylori gastritis treated with quadruple therapy. Underwent a assessment EGD 2 days ago. States since EGD, she has had recurrent loose stools which are dark in nature; concerning for blood. Review of EGD procedure note does show multiple large  Based ulcers measuring 6 to 10 mm in stomach and multiple biopsies. The patient denies any fever, chills, chest or abdominal pain, nausea, vomiting, cough, congestion, recent illness, palpitations, or dysuria. Remarkable vitals on ER Presentation: Heart rate 115  Labs Remarkable for: bnp >62448, trop 4855, la-5.0, k-2.6, cr 5.69, hgb 7.4, glu 188  ER Images: ct abd et pelvis:  Mild diffuse colonic wall thickening with associated inflammatory changes  compatible with colitis of indeterminate etiology. Indeterminate 12 mm hypodense  lesion right hepatic lobe. Correlation with nonemergent contrast-enhanced  abdomen CT is recommended for better characterization. Free fluid related to  peritoneal dialysis catheter. Moderate bilateral pleural effusions with  underlying atelectasis. ER Rx: rocephin and flagyl     Review of Systems:  Pertinent items are noted in the History of Present Illness.      Past Medical History:   Diagnosis Date    Asthma     CAD (coronary artery disease)     CHF (congestive heart failure) ([address])     Dr Booth    Chronic kidney disease     was on HD M-W-F [facility] [phone]- now on PD    COPD (chronic obstructive pulmonary disease) ([address])     PCP manages    Diabetes ([address])     DM2    ESRD on peritoneal dialysis ([address])

## 2023-08-19 NOTE — FLOWSHEET NOTE
[facility]   CCPD DISCONNECTION NOTE     08/19/23 1700   Peritoneal Dialysis Catheter Mid lower abdomen   No placement date or time found. Catheter Location: Mid lower abdomen   Status Clamped   Site Condition Clean, dry, intact   Dressing Status Clean, dry & intact   Date of Last Dressing Change 08/19/23   Dialysis Type Continuous cycling  (Will  hold ccpd tonight due to hypotension as per Dr. Suzanna Horan orders.)   Catheter Care Given Yes   Post-Treatment (Cycler)   Average Dwell Time (Hours:Minutes) 1:19   Lost Dwell Time (Hours:Minutes) 0:23  (check pt line alarm)   Effluent Appearance Clear;Yellow   PD Output (mL) 848 mL        08/19/23 1700   Treatment   Time Off 1645   Observations & Evaluations   Level of Consciousness 0   Oriented X 4   Skin Color   (appropriate for ethnicity)   Skin Condition/Temp Cool;Dry   Abdomen Inspection Soft;Rounded   Bowel Sounds (All Quadrants) Active   Technical Checks   ICEBOAT I;C;E;B;O;A;T     Primary RN Darin King RN  Patient Education: Plan of care reviewed with patient    Upon arrival for disconnection, primary RN responding to possible blood transfusion reaction with associated hypotension. MD at bedside. Pt bp remained in systolic 70 range despite interventions. As per conversation with Dr. Suzanna Horan, we will not proceed with 2nd pm cycler treatment. Will hold overnight and requested Dialysis RN reaches out in am to review plan of care. Update provided  to primary RN,  MD at bedside as well as patient. Staff educated to take  patient's pd cart with her as she transfers to a new room.       Hepatitis B Surface Ag   Date/Time Value Ref Range Status   07/19/2023 12:04 PM <0.10 Index Final     Hep B S Ab   Date/Time Value Ref Range Status   07/19/2023 12:04 PM <3.10 mIU/mL Final

## 2023-08-19 NOTE — ED NOTES
Return call received from dialysis nurse who states she will check on the status of the patient's dialysis.      Thom Flowers RN  08/19/23 8480

## 2023-08-19 NOTE — ED NOTES
TRANSFER - OUT REPORT:    Verbal report given to Tita Cotton RN on [employer]  being transferred to [address] [place] [address] for routine progression of patient care       Report consisted of patient's Situation, Background, Assessment and   Recommendations(SBAR). Information from the following report(s) ED SBAR was reviewed with the receiving nurse. Kush Fall Assessment:    Presents to emergency department  because of falls (Syncope, seizure, or loss of consciousness): No  Age > 70: No  Altered Mental Status, Intoxication with alcohol or substance confusion (Disorientation, impaired judgment, poor safety awaremess, or inability to follow instructions): No  Impaired Mobility: Ambulates or transfers with assistive devices or assistance; Unable to ambulate or transer.: Yes  Nursing Judgement: No          Lines:   Peripheral IV 08/18/23 Right Antecubital (Active)   Site Assessment Clean, dry & intact 08/18/23 1905   Line Status Flushed;Specimen collected 08/18/23 [address] checked and tightened 08/18/23 1905   Phlebitis Assessment No symptoms 08/18/23 1905   Infiltration Assessment 0 08/18/23 1905   Alcohol Cap Used No 08/18/23 1905   Dressing Status New dressing applied 08/18/23 1905   Dressing Type Transparent 08/18/23 1905   Dressing Intervention New 08/18/23 1905       Peripheral IV 08/18/23 Left Antecubital (Active)        Opportunity for questions and clarification was provided.       Patient transported with:  Monitor and Registered Nurse          Germaine Gong RN  08/19/23 1909

## 2023-08-19 NOTE — ED NOTES
Bedside shift change report given to [address] (oncoming nurse) by Kaelyn Cantu (offgoing nurse). Report included the following information Nurse Handoff Report, ED Encounter Summary, MAR, and Recent Results.         Hannah Reyes RN  08/19/23 6425

## 2023-08-19 NOTE — ED NOTES
Patient noted to have a sudden decrease in blood pressure during blood transfusion. Transfusion stopped immediately, line aspirated and then flushed with normal saline. Call placed to MD regarding further orders at this time. 1635- blood bank notified of possible reaction    1640- MD at bedside, verbal orders to infuse NS at 125, continue to hold transfusion and monitor at this time. 1715- hypotension not improving. MD at bedside, fluid rate increase to 250. Magnesium and ABX stopped due to unknown etiology of hypotension. 1717- ICU consulted for orders regarding patient's hypotension. Orders for epinephrine IV ordered. 1728- 0.3 mg epinephrine administered via pts IV. Pt began vomiting after 0.3, this RN stopped push dose. Pt's BP improved to 178/84. Verbal orders to hold additional 0.7mg IV at this time and to watch the progress of BP.     1745- pt noted to be hypotensive with a MAP of 62, MD notified. ICU consulted, levophed ordered and level of care transferred to critical care. Blood specimens collected and ambulated to blood bank along with transfused unit and transfusion report.               Preeti See RN  08/19/23 1632       Preeti See RN  08/19/23 1639       Preeti See RN  08/19/23 1647       Preeti See RN  08/19/23 1714       Preeti See RN  08/19/23 [address] [facility], RN  08/19/23 1910

## 2023-08-19 NOTE — ED NOTES
System Downtime Recovery  The EMR experienced a system downtime. This downtime occurred on August 19 at 0100 AM for a duration of 1 hour(s). During this downtime paper charting was completed by ANGELICA Nguyen RN  08/19/23 9024

## 2023-08-19 NOTE — ED NOTES
Dr. Liliana Foreman contacted to clarify additional unit of blood ordered. This RN informed her that one unit was already ordered and held by Dr. Elysia Antonio last night. Dr. Liliana Foreman states only one unit total needed, blood bank notified and transfusion ready to be administered.       [address] [place] [address], RN  08/19/23 2106

## 2023-08-19 NOTE — ED NOTES
Md notified of increase in lactic acid. No new orders received at this time.       [address], RN  08/19/23 1335

## 2023-08-19 NOTE — ED NOTES
Md informs Rn to not give blood transfusion and to hold the order.       Annie Handley RN  08/19/23 7940

## 2023-08-19 NOTE — ED PROVIDER NOTES
[facility] EMERGENCY DEP  EMERGENCY DEPARTMENT ENCOUNTER      Pt Name: Gurinder Slater  MRN: [id number]  [address] [facility] [place] 1955  Date of evaluation: 8/18/2023  Provider: Hilda Feng PA-C    CHIEF COMPLAINT       Chief Complaint   Patient presents with    Diarrhea         HISTORY OF PRESENT ILLNESS   (Location/Symptom, Timing/Onset, Context/Setting, Quality, Duration, Modifying Factors, Severity)  Note limiting factors. 70 yr old female with PMHx CHF, CAD, CKD, DM, ESRD, HTN presenting for evaluation of diarrhea and SOB. Pt states that she underwent endoscopy on 8/15/23 and since this procedure has been experiencing multiple, frequent episodes of dark, loose stool. She has since also developed SOB and diffuse abdominal pain. She denies associated fever, cough, chest pain, nausea, vomiting. The history is provided by the patient. No  was used. Review of External Medical Records:     Nursing Notes were reviewed. REVIEW OF SYSTEMS    (2-9 systems for level 4, 10 or more for level 5)     Review of Systems   Constitutional:  Negative for fever. Respiratory:  Positive for shortness of breath. Negative for cough. Cardiovascular:  Negative for chest pain and leg swelling. Gastrointestinal:  Positive for abdominal pain and diarrhea. Negative for nausea and vomiting. Genitourinary:  Negative for dysuria. Except as noted above the remainder of the review of systems was reviewed and negative.        PAST MEDICAL HISTORY     Past Medical History:   Diagnosis Date    Asthma     CAD (coronary artery disease)     CHF (congestive heart failure) ([facility])      ?    Chronic kidney disease     was on HD M-W-F Eduardo Rosa [phone]- now on PD    COPD (chronic obstructive pulmonary disease) ([address] )     PCP manages    Diabetes ([address])     DM2    ESRD on peritoneal dialysis ([address])     Eduardo (Tammie-PD nurse) Nigel Ramirez [phone]- now on PD    GERD (gastroesophageal reflux disease)     History of blood

## 2023-08-20 LAB
ABO + RH BLD: NORMAL
ABO + RH BLD: NORMAL
ALBUMIN SERPL-MCNC: 2.1 G/DL (ref 3.5–5)
ANION GAP SERPL CALC-SCNC: 7 MMOL/L (ref 5–15)
ANION GAP SERPL CALC-SCNC: 8 MMOL/L (ref 5–15)
BACTERIA SPEC CULT: ABNORMAL
BACTERIA SPEC CULT: ABNORMAL
BASOPHILS # BLD: 0 K/UL (ref 0–0.1)
BASOPHILS NFR BLD: 0 % (ref 0–1)
BLD PROD TYP BPU: NORMAL
BLD PROD TYP BPU: NORMAL
BLOOD BAG HEMOLYSIS: NORMAL
BLOOD BANK DISPENSE STATUS: NORMAL
BLOOD GROUP ANTIBODIES SERPL: NORMAL
BPU ID: NORMAL
BUN SERPL-MCNC: 21 MG/DL (ref 6–20)
BUN SERPL-MCNC: 21 MG/DL (ref 6–20)
BUN/CREAT SERPL: 3 (ref 12–20)
BUN/CREAT SERPL: 4 (ref 12–20)
CALCIUM SERPL-MCNC: 7.6 MG/DL (ref 8.5–10.1)
CALCIUM SERPL-MCNC: 7.7 MG/DL (ref 8.5–10.1)
CHLORIDE SERPL-SCNC: 102 MMOL/L (ref 97–108)
CHLORIDE SERPL-SCNC: 102 MMOL/L (ref 97–108)
CLERICAL ERRORS: NORMAL
CO2 SERPL-SCNC: 26 MMOL/L (ref 21–32)
CO2 SERPL-SCNC: 27 MMOL/L (ref 21–32)
CREAT SERPL-MCNC: 5.99 MG/DL (ref 0.55–1.02)
CREAT SERPL-MCNC: 6.08 MG/DL (ref 0.55–1.02)
CROSSMATCH RESULT: NORMAL
DAT P TRANSF RBC QL: NORMAL
DAT RBC QL: NORMAL
DIFFERENTIAL METHOD BLD: ABNORMAL
EKG ATRIAL RATE: 112 BPM
EKG ATRIAL RATE: 118 BPM
EKG ATRIAL RATE: 85 BPM
EKG DIAGNOSIS: NORMAL
EKG P AXIS: 47 DEGREES
EKG P AXIS: 62 DEGREES
EKG P AXIS: 81 DEGREES
EKG P-R INTERVAL: 152 MS
EKG P-R INTERVAL: 152 MS
EKG P-R INTERVAL: 164 MS
EKG Q-T INTERVAL: 340 MS
EKG Q-T INTERVAL: 380 MS
EKG Q-T INTERVAL: 442 MS
EKG QRS DURATION: 82 MS
EKG QRS DURATION: 82 MS
EKG QRS DURATION: 84 MS
EKG QTC CALCULATION (BAZETT): 476 MS
EKG QTC CALCULATION (BAZETT): 518 MS
EKG QTC CALCULATION (BAZETT): 525 MS
EKG R AXIS: -4 DEGREES
EKG R AXIS: -9 DEGREES
EKG R AXIS: 5 DEGREES
EKG T AXIS: 133 DEGREES
EKG T AXIS: 140 DEGREES
EKG T AXIS: 146 DEGREES
EKG VENTRICULAR RATE: 112 BPM
EKG VENTRICULAR RATE: 118 BPM
EKG VENTRICULAR RATE: 85 BPM
EOSINOPHIL # BLD: 0.4 K/UL (ref 0–0.4)
EOSINOPHIL NFR BLD: 4 % (ref 0–7)
ERYTHROCYTE [DISTWIDTH] IN BLOOD BY AUTOMATED COUNT: 17.4 % (ref 11.5–14.5)
ERYTHROCYTE [DISTWIDTH] IN BLOOD BY AUTOMATED COUNT: 18.9 % (ref 11.5–14.5)
GLUCOSE BLD STRIP.AUTO-MCNC: 110 MG/DL (ref 65–117)
GLUCOSE BLD STRIP.AUTO-MCNC: 122 MG/DL (ref 65–117)
GLUCOSE BLD STRIP.AUTO-MCNC: 173 MG/DL (ref 65–117)
GLUCOSE BLD STRIP.AUTO-MCNC: 287 MG/DL (ref 65–117)
GLUCOSE SERPL-MCNC: 106 MG/DL (ref 65–100)
GLUCOSE SERPL-MCNC: 107 MG/DL (ref 65–100)
HBV SURFACE AB SER QL: NONREACTIVE
HBV SURFACE AB SER-ACNC: <3.1 MIU/ML
HCT VFR BLD AUTO: 19.6 % (ref 35–47)
HCT VFR BLD AUTO: 23.6 % (ref 35–47)
HEMOLYSIS, POST TRANSFUSION: NORMAL
HEMOLYSIS, PRE TRANSFUSION: NORMAL
HGB BLD-MCNC: 5.9 G/DL (ref 11.5–16)
HGB BLD-MCNC: 7.4 G/DL (ref 11.5–16)
HISTORY CHECK: NORMAL
IMM GRANULOCYTES # BLD AUTO: 0 K/UL (ref 0–0.04)
IMM GRANULOCYTES NFR BLD AUTO: 0 % (ref 0–0.5)
LACTATE SERPL-SCNC: 1.9 MMOL/L (ref 0.4–2)
LYMPHOCYTES # BLD: 1 K/UL (ref 0.8–3.5)
LYMPHOCYTES NFR BLD: 10 % (ref 12–49)
MAGNESIUM SERPL-MCNC: 1.9 MG/DL (ref 1.6–2.4)
MCH RBC QN AUTO: 28.1 PG (ref 26–34)
MCH RBC QN AUTO: 29.6 PG (ref 26–34)
MCHC RBC AUTO-ENTMCNC: 30.1 G/DL (ref 30–36.5)
MCHC RBC AUTO-ENTMCNC: 31.4 G/DL (ref 30–36.5)
MCV RBC AUTO: 93.3 FL (ref 80–99)
MCV RBC AUTO: 94.4 FL (ref 80–99)
MD INTERPRETATION: NORMAL
MONOCYTES # BLD: 0.5 K/UL (ref 0–1)
MONOCYTES NFR BLD: 5 % (ref 5–13)
NEUTS SEG # BLD: 7.9 K/UL (ref 1.8–8)
NEUTS SEG NFR BLD: 81 % (ref 32–75)
NRBC # BLD: 0 K/UL (ref 0–0.01)
NRBC # BLD: 0 K/UL (ref 0–0.01)
NRBC BLD-RTO: 0 PER 100 WBC
NRBC BLD-RTO: 0 PER 100 WBC
PATH REV BLD -IMP: ABNORMAL
PATH REV BLD -IMP: NORMAL
PHOSPHATE SERPL-MCNC: 4.1 MG/DL (ref 2.6–4.7)
PLATELET # BLD AUTO: 238 K/UL (ref 150–400)
PLATELET # BLD AUTO: 252 K/UL (ref 150–400)
PMV BLD AUTO: 10.1 FL (ref 8.9–12.9)
PMV BLD AUTO: 9.7 FL (ref 8.9–12.9)
POTASSIUM SERPL-SCNC: 3.3 MMOL/L (ref 3.5–5.1)
POTASSIUM SERPL-SCNC: 3.3 MMOL/L (ref 3.5–5.1)
PROCALCITONIN SERPL-MCNC: 0.51 NG/ML
RBC # BLD AUTO: 2.1 M/UL (ref 3.8–5.2)
RBC # BLD AUTO: 2.5 M/UL (ref 3.8–5.2)
RBC MORPH BLD: ABNORMAL
RBC MORPH BLD: ABNORMAL
SERVICE CMNT-IMP: ABNORMAL
SERVICE CMNT-IMP: NORMAL
SODIUM SERPL-SCNC: 136 MMOL/L (ref 136–145)
SODIUM SERPL-SCNC: 136 MMOL/L (ref 136–145)
SPECIMEN EXP DATE BLD: NORMAL
TROPONIN I SERPL HS-MCNC: 3861 NG/L (ref 0–51)
UNIT DIVISION: 0
UNIT NUMBER: NORMAL
WBC # BLD AUTO: 10 K/UL (ref 3.6–11)
WBC # BLD AUTO: 9.8 K/UL (ref 3.6–11)

## 2023-08-20 PROCEDURE — 83605 ASSAY OF LACTIC ACID: CPT

## 2023-08-20 PROCEDURE — 97530 THERAPEUTIC ACTIVITIES: CPT

## 2023-08-20 PROCEDURE — 2000000000 HC ICU R&B

## 2023-08-20 PROCEDURE — P9016 RBC LEUKOCYTES REDUCED: HCPCS

## 2023-08-20 PROCEDURE — 2580000003 HC RX 258: Performed by: STUDENT IN AN ORGANIZED HEALTH CARE EDUCATION/TRAINING PROGRAM

## 2023-08-20 PROCEDURE — 84484 ASSAY OF TROPONIN QUANT: CPT

## 2023-08-20 PROCEDURE — 6360000002 HC RX W HCPCS: Performed by: NURSE PRACTITIONER

## 2023-08-20 PROCEDURE — 36415 COLL VENOUS BLD VENIPUNCTURE: CPT

## 2023-08-20 PROCEDURE — 90945 DIALYSIS ONE EVALUATION: CPT

## 2023-08-20 PROCEDURE — 36430 TRANSFUSION BLD/BLD COMPNT: CPT

## 2023-08-20 PROCEDURE — 30233N1 TRANSFUSION OF NONAUTOLOGOUS RED BLOOD CELLS INTO PERIPHERAL VEIN, PERCUTANEOUS APPROACH: ICD-10-PCS | Performed by: INTERNAL MEDICINE

## 2023-08-20 PROCEDURE — 2580000003 HC RX 258: Performed by: INTERNAL MEDICINE

## 2023-08-20 PROCEDURE — 80069 RENAL FUNCTION PANEL: CPT

## 2023-08-20 PROCEDURE — 97165 OT EVAL LOW COMPLEX 30 MIN: CPT

## 2023-08-20 PROCEDURE — A4216 STERILE WATER/SALINE, 10 ML: HCPCS | Performed by: INTERNAL MEDICINE

## 2023-08-20 PROCEDURE — 82962 GLUCOSE BLOOD TEST: CPT

## 2023-08-20 PROCEDURE — 97161 PT EVAL LOW COMPLEX 20 MIN: CPT

## 2023-08-20 PROCEDURE — P9045 ALBUMIN (HUMAN), 5%, 250 ML: HCPCS | Performed by: NURSE PRACTITIONER

## 2023-08-20 PROCEDURE — 80048 BASIC METABOLIC PNL TOTAL CA: CPT

## 2023-08-20 PROCEDURE — 6370000000 HC RX 637 (ALT 250 FOR IP): Performed by: STUDENT IN AN ORGANIZED HEALTH CARE EDUCATION/TRAINING PROGRAM

## 2023-08-20 PROCEDURE — 6360000002 HC RX W HCPCS: Performed by: INTERNAL MEDICINE

## 2023-08-20 PROCEDURE — 83735 ASSAY OF MAGNESIUM: CPT

## 2023-08-20 PROCEDURE — 86706 HEP B SURFACE ANTIBODY: CPT

## 2023-08-20 PROCEDURE — 97116 GAIT TRAINING THERAPY: CPT

## 2023-08-20 PROCEDURE — 93005 ELECTROCARDIOGRAM TRACING: CPT | Performed by: NURSE PRACTITIONER

## 2023-08-20 PROCEDURE — C9113 INJ PANTOPRAZOLE SODIUM, VIA: HCPCS | Performed by: INTERNAL MEDICINE

## 2023-08-20 PROCEDURE — 85027 COMPLETE CBC AUTOMATED: CPT

## 2023-08-20 PROCEDURE — 2500000003 HC RX 250 WO HCPCS: Performed by: INTERNAL MEDICINE

## 2023-08-20 RX ORDER — SODIUM CHLORIDE 9 MG/ML
INJECTION, SOLUTION INTRAVENOUS PRN
Status: DISCONTINUED | OUTPATIENT
Start: 2023-08-20 | End: 2023-08-21

## 2023-08-20 RX ORDER — NITROGLYCERIN 0.4 MG/1
TABLET SUBLINGUAL
Status: DISPENSED
Start: 2023-08-20 | End: 2023-08-20

## 2023-08-20 RX ORDER — DIPHENHYDRAMINE HYDROCHLORIDE 50 MG/ML
25 INJECTION INTRAMUSCULAR; INTRAVENOUS ONCE
Status: COMPLETED | OUTPATIENT
Start: 2023-08-20 | End: 2023-08-20

## 2023-08-20 RX ORDER — ALBUMIN, HUMAN INJ 5% 5 %
25 SOLUTION INTRAVENOUS ONCE
Status: COMPLETED | OUTPATIENT
Start: 2023-08-20 | End: 2023-08-20

## 2023-08-20 RX ORDER — HYDROMORPHONE HYDROCHLORIDE 1 MG/ML
0.5 INJECTION, SOLUTION INTRAMUSCULAR; INTRAVENOUS; SUBCUTANEOUS EVERY 4 HOURS PRN
Status: DISCONTINUED | OUTPATIENT
Start: 2023-08-20 | End: 2023-08-21

## 2023-08-20 RX ORDER — POTASSIUM CHLORIDE 7.45 MG/ML
10 INJECTION INTRAVENOUS
Status: COMPLETED | OUTPATIENT
Start: 2023-08-20 | End: 2023-08-20

## 2023-08-20 RX ORDER — FOLIC ACID 5 MG/ML
1 INJECTION, SOLUTION INTRAMUSCULAR; INTRAVENOUS; SUBCUTANEOUS DAILY
Status: DISCONTINUED | OUTPATIENT
Start: 2023-08-20 | End: 2023-08-20 | Stop reason: SDUPTHER

## 2023-08-20 RX ORDER — MAGNESIUM SULFATE 1 G/100ML
1000 INJECTION INTRAVENOUS ONCE
Status: COMPLETED | OUTPATIENT
Start: 2023-08-20 | End: 2023-08-20

## 2023-08-20 RX ADMIN — PANTOPRAZOLE SODIUM 40 MG: 40 INJECTION, POWDER, FOR SOLUTION INTRAVENOUS at 13:49

## 2023-08-20 RX ADMIN — MAGNESIUM SULFATE HEPTAHYDRATE 1000 MG: 1 INJECTION, SOLUTION INTRAVENOUS at 01:33

## 2023-08-20 RX ADMIN — POTASSIUM CHLORIDE 10 MEQ: 7.46 INJECTION, SOLUTION INTRAVENOUS at 01:34

## 2023-08-20 RX ADMIN — FOLIC ACID 1 MG: 5 INJECTION, SOLUTION INTRAMUSCULAR; INTRAVENOUS; SUBCUTANEOUS at 19:12

## 2023-08-20 RX ADMIN — MONTELUKAST 10 MG: 10 TABLET, FILM COATED ORAL at 20:39

## 2023-08-20 RX ADMIN — PANTOPRAZOLE SODIUM 40 MG: 40 INJECTION, POWDER, FOR SOLUTION INTRAVENOUS at 01:34

## 2023-08-20 RX ADMIN — HYDROMORPHONE HYDROCHLORIDE 0.5 MG: 1 INJECTION, SOLUTION INTRAMUSCULAR; INTRAVENOUS; SUBCUTANEOUS at 12:20

## 2023-08-20 RX ADMIN — DIPHENHYDRAMINE HYDROCHLORIDE 25 MG: 50 INJECTION, SOLUTION INTRAMUSCULAR; INTRAVENOUS at 16:03

## 2023-08-20 RX ADMIN — GENTAMICIN SULFATE: 1 CREAM TOPICAL at 16:01

## 2023-08-20 RX ADMIN — PIPERACILLIN AND TAZOBACTAM 3375 MG: 3; .375 INJECTION, POWDER, LYOPHILIZED, FOR SOLUTION INTRAVENOUS at 01:36

## 2023-08-20 RX ADMIN — PIPERACILLIN AND TAZOBACTAM 3375 MG: 3; .375 INJECTION, POWDER, LYOPHILIZED, FOR SOLUTION INTRAVENOUS at 14:56

## 2023-08-20 RX ADMIN — SODIUM CHLORIDE, SODIUM LACTATE, CALCIUM CHLORIDE, MAGNESIUM CHLORIDE AND DEXTROSE 6000 ML: 2.5; 538; 448; 18.3; 5.08 INJECTION, SOLUTION INTRAPERITONEAL at 16:02

## 2023-08-20 RX ADMIN — POTASSIUM CHLORIDE 10 MEQ: 7.46 INJECTION, SOLUTION INTRAVENOUS at 02:53

## 2023-08-20 RX ADMIN — ALBUMIN (HUMAN) 25 G: 12.5 INJECTION, SOLUTION INTRAVENOUS at 01:33

## 2023-08-20 RX ADMIN — POTASSIUM CHLORIDE 10 MEQ: 10 INJECTION, SOLUTION INTRAVENOUS at 15:29

## 2023-08-20 RX ADMIN — Medication 10 ML: at 08:49

## 2023-08-20 RX ADMIN — POTASSIUM CHLORIDE 10 MEQ: 10 INJECTION, SOLUTION INTRAVENOUS at 12:22

## 2023-08-20 RX ADMIN — DULOXETINE 20 MG: 20 CAPSULE, DELAYED RELEASE ORAL at 20:39

## 2023-08-20 RX ADMIN — HYDROMORPHONE HYDROCHLORIDE 0.5 MG: 1 INJECTION, SOLUTION INTRAMUSCULAR; INTRAVENOUS; SUBCUTANEOUS at 07:02

## 2023-08-20 RX ADMIN — HYDROMORPHONE HYDROCHLORIDE 0.5 MG: 1 INJECTION, SOLUTION INTRAMUSCULAR; INTRAVENOUS; SUBCUTANEOUS at 17:14

## 2023-08-20 RX ADMIN — FLUCONAZOLE IN SODIUM CHLORIDE 100 MG: 2 INJECTION, SOLUTION INTRAVENOUS at 13:44

## 2023-08-20 ASSESSMENT — ENCOUNTER SYMPTOMS
SHORTNESS OF BREATH: 0
VOMITING: 0
COUGH: 0
ABDOMINAL DISTENTION: 0
ABDOMINAL PAIN: 0
NAUSEA: 0
CHEST TIGHTNESS: 0

## 2023-08-20 ASSESSMENT — PAIN SCALES - GENERAL
PAINLEVEL_OUTOF10: 5
PAINLEVEL_OUTOF10: 10

## 2023-08-20 ASSESSMENT — PAIN DESCRIPTION - LOCATION: LOCATION: OTHER (COMMENT)

## 2023-08-20 NOTE — CARE COORDINATION
Care Management Initial Assessment       RUR: 25% high  Readmission? Yes   1st IM letter given? No     08/20/23 1143   Service Assessment   Patient Orientation Alert and Oriented;Person;Place;Situation;Self   Cognition Alert   History Provided By Patient   Primary Caregiver Self   Support Systems Children  ([address] Meet [place] [phone], Daughter Natacha Mtz [phone])   [address] is: Legal Next of Kin  (son and daughter)   PCP Verified by CM Yes  (Dr Gentry Tao)   Last Visit to PCP Within last 6 months   Prior Functional Level Independent in ADLs/IADLs   Current Functional Level Assistance with the following:;Bathing;Dressing; Toileting;Mobility   Can patient return to prior living arrangement Unknown at present   Ability to make needs known: Good   Family able to assist with home care needs: Yes   Would you like for me to discuss the discharge plan with any other family members/significant others, and if so, who? Yes  (Martha)   Financial Resources  Resources None   Social/Functional History   Lives With Son  Meet [place] [phone])   Type of [address] [place] [address] Dr One level   Home Access Stairs to enter with rails   Entrance Stairs - Number of Steps 3   Bathroom Shower/Tub Shower chair without back   [place]   [address] Help From Family   ADL Assistance Independent   Homemaking Assistance Independent   Ambulation Assistance Independent   Transfer Assistance Independent   Active  No   Patient's  Info Son and daughter transport   Mode of Transportation Car   Occupation Retired   Discharge Planning   Living Arrangements Children   Potential Assistance Purchasing Medications No   One/Two [address] One story   History of falls? 0     Patient presented to the ED from home with abdominal pain and diarrhea. Patient is ESRD on Peritoneal dialysis.    Care manager met with patient to introduce

## 2023-08-20 NOTE — CARE COORDINATION
08/20/23 1156   Readmission Assessment   Number of Days since last admission? 8-30 days  (previous DOS 7/18/23-7/24/23)   Previous Disposition Home with [facility])   Who is being Interviewed Patient   What was the patient's/caregiver's perception as to why they think they needed to return back to the hospital? Other (Comment)  (Paitent admitted with abdominal pain)   Did you see a specialist, such as Cardiac, Pulmonary, Orthopedic Physician, etc. after you left the hospital? No   Who advised the patient to return to the hospital? Self-referral   Does the patient report anything that got in the way of taking their medications?  No

## 2023-08-20 NOTE — FLOWSHEET NOTE
Eduardo CCPD initiation     08/20/23 1605   Vitals   BP (!) 151/91   Pulse 98   Respirations 12   SpO2 97 %   Peritoneal Dialysis Catheter Mid lower abdomen   No placement date or time found. Catheter Location: Mid lower abdomen   Status Accessed   Site Condition Clean, dry, intact   Dressing Status New dressing applied   Dressing Gauze   Date of Last Dressing Change 08/20/23   Dialysis Type Continuous cycling   Exit Site Condition Excellent   Catheter Care Given Yes   Cycler   Verification of Prescription CCPD   Informed Consent  Yes   Total Volume Programmed 30454 mL   Therapy Time (Hours:Minutes) 570   Cycler Type Jung HomeChoice   Fill Volume 2300 mL   Last Fill Volume 0 mL   Dextrose Setting Same (Nonextraneal)   I Drain Alarm 0 mL   Number of Cycles 5   Bag Volume 6000 mL   Number of Bags Used 2   Dianeal Solution Other (Comment)  (2.5% in 6000 ml)     Time Out (time): 1600  Primary RN SBAR: Mery Garvin RN  Patient Education: access care, procedure    Patient orders, notes, labs and code status reviewed. Time out completed. Chronic consent applies. Pt reports feeling much better today. Prior to connection 1 minute Alcavis scrub followed by 1 minute Alcavis soak performed. Stayed with the patient during initial drain and start of a first fill. Upon departure, education provided, bed in lowest position, call bell and personal belongings within reach.       Hepatitis B Surface Ag   Date/Time Value Ref Range Status   07/19/2023 12:04 PM <0.10 Index Final     Hep B S Ab   Date/Time Value Ref Range Status   08/20/2023 10:01 AM <3.10 mIU/mL Final

## 2023-08-21 ENCOUNTER — APPOINTMENT (OUTPATIENT)
Facility: HOSPITAL | Age: 68
DRG: 853 | End: 2023-08-21
Attending: INTERNAL MEDICINE
Payer: MEDICARE

## 2023-08-21 LAB
ABO + RH BLD: NORMAL
ANION GAP SERPL CALC-SCNC: 8 MMOL/L (ref 5–15)
BLD PROD TYP BPU: NORMAL
BLOOD BANK DISPENSE STATUS: NORMAL
BLOOD GROUP ANTIBODIES SERPL: NORMAL
BPU ID: NORMAL
BUN SERPL-MCNC: 19 MG/DL (ref 6–20)
BUN/CREAT SERPL: 3 (ref 12–20)
CALCIUM SERPL-MCNC: 7.5 MG/DL (ref 8.5–10.1)
CHLORIDE SERPL-SCNC: 102 MMOL/L (ref 97–108)
CO2 SERPL-SCNC: 28 MMOL/L (ref 21–32)
CREAT SERPL-MCNC: 5.44 MG/DL (ref 0.55–1.02)
CROSSMATCH RESULT: NORMAL
ERYTHROCYTE [DISTWIDTH] IN BLOOD BY AUTOMATED COUNT: 17.3 % (ref 11.5–14.5)
GLUCOSE BLD STRIP.AUTO-MCNC: 157 MG/DL (ref 65–117)
GLUCOSE BLD STRIP.AUTO-MCNC: 205 MG/DL (ref 65–117)
GLUCOSE BLD STRIP.AUTO-MCNC: 274 MG/DL (ref 65–117)
GLUCOSE SERPL-MCNC: 138 MG/DL (ref 65–100)
HCT VFR BLD AUTO: 23.1 % (ref 35–47)
HGB BLD-MCNC: 7.2 G/DL (ref 11.5–16)
MCH RBC QN AUTO: 28.8 PG (ref 26–34)
MCHC RBC AUTO-ENTMCNC: 31.2 G/DL (ref 30–36.5)
MCV RBC AUTO: 92.4 FL (ref 80–99)
NRBC # BLD: 0 K/UL (ref 0–0.01)
NRBC BLD-RTO: 0 PER 100 WBC
PLATELET # BLD AUTO: 251 K/UL (ref 150–400)
PMV BLD AUTO: 9.7 FL (ref 8.9–12.9)
POTASSIUM SERPL-SCNC: 3 MMOL/L (ref 3.5–5.1)
RBC # BLD AUTO: 2.5 M/UL (ref 3.8–5.2)
SERVICE CMNT-IMP: ABNORMAL
SODIUM SERPL-SCNC: 138 MMOL/L (ref 136–145)
SPECIMEN EXP DATE BLD: NORMAL
UNIT DIVISION: 0
VANCOMYCIN SERPL-MCNC: 22.4 UG/ML
WBC # BLD AUTO: 8.6 K/UL (ref 3.6–11)

## 2023-08-21 PROCEDURE — 80202 ASSAY OF VANCOMYCIN: CPT

## 2023-08-21 PROCEDURE — 2060000000 HC ICU INTERMEDIATE R&B

## 2023-08-21 PROCEDURE — 6360000002 HC RX W HCPCS: Performed by: INTERNAL MEDICINE

## 2023-08-21 PROCEDURE — 6360000002 HC RX W HCPCS: Performed by: NURSE PRACTITIONER

## 2023-08-21 PROCEDURE — 2580000003 HC RX 258: Performed by: INTERNAL MEDICINE

## 2023-08-21 PROCEDURE — 82962 GLUCOSE BLOOD TEST: CPT

## 2023-08-21 PROCEDURE — 2500000003 HC RX 250 WO HCPCS: Performed by: INTERNAL MEDICINE

## 2023-08-21 PROCEDURE — 97116 GAIT TRAINING THERAPY: CPT

## 2023-08-21 PROCEDURE — 85027 COMPLETE CBC AUTOMATED: CPT

## 2023-08-21 PROCEDURE — 6360000004 HC RX CONTRAST MEDICATION: Performed by: STUDENT IN AN ORGANIZED HEALTH CARE EDUCATION/TRAINING PROGRAM

## 2023-08-21 PROCEDURE — 6370000000 HC RX 637 (ALT 250 FOR IP): Performed by: NURSE PRACTITIONER

## 2023-08-21 PROCEDURE — 36415 COLL VENOUS BLD VENIPUNCTURE: CPT

## 2023-08-21 PROCEDURE — C8929 TTE W OR WO FOL WCON,DOPPLER: HCPCS

## 2023-08-21 PROCEDURE — C9113 INJ PANTOPRAZOLE SODIUM, VIA: HCPCS | Performed by: INTERNAL MEDICINE

## 2023-08-21 PROCEDURE — 6370000000 HC RX 637 (ALT 250 FOR IP): Performed by: INTERNAL MEDICINE

## 2023-08-21 PROCEDURE — 80048 BASIC METABOLIC PNL TOTAL CA: CPT

## 2023-08-21 PROCEDURE — A4216 STERILE WATER/SALINE, 10 ML: HCPCS | Performed by: INTERNAL MEDICINE

## 2023-08-21 PROCEDURE — 2580000003 HC RX 258: Performed by: STUDENT IN AN ORGANIZED HEALTH CARE EDUCATION/TRAINING PROGRAM

## 2023-08-21 PROCEDURE — 6370000000 HC RX 637 (ALT 250 FOR IP): Performed by: STUDENT IN AN ORGANIZED HEALTH CARE EDUCATION/TRAINING PROGRAM

## 2023-08-21 PROCEDURE — 97530 THERAPEUTIC ACTIVITIES: CPT

## 2023-08-21 RX ORDER — POTASSIUM CHLORIDE 7.45 MG/ML
10 INJECTION INTRAVENOUS
Status: COMPLETED | OUTPATIENT
Start: 2023-08-21 | End: 2023-08-21

## 2023-08-21 RX ADMIN — POTASSIUM BICARBONATE 40 MEQ: 782 TABLET, EFFERVESCENT ORAL at 08:32

## 2023-08-21 RX ADMIN — PERFLUTREN 1.5 ML: 6.52 INJECTION, SUSPENSION INTRAVENOUS at 15:58

## 2023-08-21 RX ADMIN — POTASSIUM CHLORIDE 10 MEQ: 10 INJECTION, SOLUTION INTRAVENOUS at 08:32

## 2023-08-21 RX ADMIN — Medication 2 UNITS: at 17:19

## 2023-08-21 RX ADMIN — HYDROMORPHONE HYDROCHLORIDE 0.5 MG: 1 INJECTION, SOLUTION INTRAMUSCULAR; INTRAVENOUS; SUBCUTANEOUS at 11:45

## 2023-08-21 RX ADMIN — CARVEDILOL 12.5 MG: 12.5 TABLET, FILM COATED ORAL at 08:33

## 2023-08-21 RX ADMIN — PIPERACILLIN AND TAZOBACTAM 3375 MG: 3; .375 INJECTION, POWDER, LYOPHILIZED, FOR SOLUTION INTRAVENOUS at 01:16

## 2023-08-21 RX ADMIN — SODIUM CHLORIDE, SODIUM LACTATE, CALCIUM CHLORIDE, MAGNESIUM CHLORIDE AND DEXTROSE 6000 ML: 2.5; 538; 448; 18.3; 5.08 INJECTION, SOLUTION INTRAPERITONEAL at 16:20

## 2023-08-21 RX ADMIN — MONTELUKAST 10 MG: 10 TABLET, FILM COATED ORAL at 21:14

## 2023-08-21 RX ADMIN — GENTAMICIN SULFATE: 1 CREAM TOPICAL at 16:20

## 2023-08-21 RX ADMIN — HYDROMORPHONE HYDROCHLORIDE 0.5 MG: 1 INJECTION, SOLUTION INTRAMUSCULAR; INTRAVENOUS; SUBCUTANEOUS at 05:52

## 2023-08-21 RX ADMIN — Medication 10 ML: at 09:00

## 2023-08-21 RX ADMIN — FOLIC ACID 1 MG: 5 INJECTION, SOLUTION INTRAMUSCULAR; INTRAVENOUS; SUBCUTANEOUS at 08:50

## 2023-08-21 RX ADMIN — CARVEDILOL 12.5 MG: 12.5 TABLET, FILM COATED ORAL at 17:14

## 2023-08-21 RX ADMIN — DULOXETINE 20 MG: 20 CAPSULE, DELAYED RELEASE ORAL at 08:32

## 2023-08-21 RX ADMIN — ERYTHROPOIETIN 20000 UNITS: 20000 INJECTION, SOLUTION INTRAVENOUS; SUBCUTANEOUS at 08:40

## 2023-08-21 RX ADMIN — DULOXETINE 20 MG: 20 CAPSULE, DELAYED RELEASE ORAL at 21:14

## 2023-08-21 RX ADMIN — ROSUVASTATIN 40 MG: 40 TABLET, FILM COATED ORAL at 08:30

## 2023-08-21 RX ADMIN — PANTOPRAZOLE SODIUM 40 MG: 40 INJECTION, POWDER, FOR SOLUTION INTRAVENOUS at 13:21

## 2023-08-21 RX ADMIN — PIPERACILLIN AND TAZOBACTAM 3375 MG: 3; .375 INJECTION, POWDER, LYOPHILIZED, FOR SOLUTION INTRAVENOUS at 13:30

## 2023-08-21 RX ADMIN — POTASSIUM BICARBONATE 40 MEQ: 782 TABLET, EFFERVESCENT ORAL at 21:14

## 2023-08-21 RX ADMIN — HYDROMORPHONE HYDROCHLORIDE 0.5 MG: 1 INJECTION, SOLUTION INTRAMUSCULAR; INTRAVENOUS; SUBCUTANEOUS at 01:10

## 2023-08-21 RX ADMIN — OXYCODONE HYDROCHLORIDE 5 MG: 5 TABLET ORAL at 17:20

## 2023-08-21 RX ADMIN — PANTOPRAZOLE SODIUM 40 MG: 40 INJECTION, POWDER, FOR SOLUTION INTRAVENOUS at 01:15

## 2023-08-21 RX ADMIN — POTASSIUM CHLORIDE 10 MEQ: 10 INJECTION, SOLUTION INTRAVENOUS at 09:49

## 2023-08-21 ASSESSMENT — PAIN SCALES - GENERAL
PAINLEVEL_OUTOF10: 3
PAINLEVEL_OUTOF10: 0
PAINLEVEL_OUTOF10: 9
PAINLEVEL_OUTOF10: 8
PAINLEVEL_OUTOF10: 8
PAINLEVEL_OUTOF10: 7
PAINLEVEL_OUTOF10: 4
PAINLEVEL_OUTOF10: 9
PAINLEVEL_OUTOF10: 4

## 2023-08-21 ASSESSMENT — PAIN DESCRIPTION - DESCRIPTORS
DESCRIPTORS: SPASM;ACHING
DESCRIPTORS: SPASM;ACHING
DESCRIPTORS: ACHING;SPASM

## 2023-08-21 ASSESSMENT — PAIN DESCRIPTION - ORIENTATION
ORIENTATION: MID

## 2023-08-21 ASSESSMENT — PAIN DESCRIPTION - PAIN TYPE: TYPE: CHRONIC PAIN

## 2023-08-21 ASSESSMENT — PAIN DESCRIPTION - LOCATION
LOCATION: BACK

## 2023-08-21 ASSESSMENT — ENCOUNTER SYMPTOMS
ALLERGIC/IMMUNOLOGIC NEGATIVE: 1
DIARRHEA: 1
EYES NEGATIVE: 1
RESPIRATORY NEGATIVE: 1

## 2023-08-21 ASSESSMENT — PAIN SCALES - WONG BAKER: WONGBAKER_NUMERICALRESPONSE: 6

## 2023-08-21 NOTE — FLOWSHEET NOTE
08/21/23 0145   Vitals   BP (!) 153/80   Pulse 89   Respirations 14   Peritoneal Dialysis Catheter Mid lower abdomen   No placement date or time found. Catheter Location: Mid lower abdomen   Status Clamped   Site Condition Clean, dry, intact   Dressing Status Clean, dry & intact   Dressing Gauze   Date of Last Dressing Change 08/20/23   Dialysis Type Continuous cycling   Catheter Care Given Yes   Drainage Description Clear;Yellow   Post-Treatment (Cycler)   Average Dwell Time (Hours:Minutes) 1:23   Lost Dwell Time (Hours:Minutes) 0:04   Effluent Appearance Clear;Yellow   I Drain (mL) 75 mL   PD Output (mL) 1300 mL  (ID 75 ml + TUF 1225 ml = 1300 ml)     Primary RN SBAR: Gallito Bañuelos RN  Comments: Pt disconnected after Alcavis 2 minutes scrub/soak performed. All tubing and bags discarded into red biohazard bag. All safety precautions observed. Bed in the lowest position, call bell within the reach.

## 2023-08-22 LAB
ANION GAP SERPL CALC-SCNC: 9 MMOL/L (ref 5–15)
BUN SERPL-MCNC: 21 MG/DL (ref 6–20)
BUN/CREAT SERPL: 4 (ref 12–20)
C COLI+JEJUNI TUF STL QL NAA+PROBE: NEGATIVE
CALCIUM SERPL-MCNC: 7.4 MG/DL (ref 8.5–10.1)
CHLORIDE SERPL-SCNC: 101 MMOL/L (ref 97–108)
CO2 SERPL-SCNC: 28 MMOL/L (ref 21–32)
CREAT SERPL-MCNC: 5.5 MG/DL (ref 0.55–1.02)
EC STX1+STX2 GENES STL QL NAA+PROBE: NEGATIVE
ECHO AO ASC DIAM: 3.4 CM
ECHO AO ASCENDING AORTA INDEX: 1.73 CM/M2
ECHO AO ROOT DIAM: 2.6 CM
ECHO AO ROOT INDEX: 1.33 CM/M2
ECHO AV AREA PEAK VELOCITY: 2 CM2
ECHO AV AREA VTI: 2.2 CM2
ECHO AV AREA/BSA PEAK VELOCITY: 1 CM2/M2
ECHO AV AREA/BSA VTI: 1.1 CM2/M2
ECHO AV MEAN GRADIENT: 3 MMHG
ECHO AV MEAN VELOCITY: 0.8 M/S
ECHO AV PEAK GRADIENT: 6 MMHG
ECHO AV PEAK VELOCITY: 1.2 M/S
ECHO AV VELOCITY RATIO: 0.67
ECHO AV VTI: 24.3 CM
ECHO BSA: 2.02 M2
ECHO EST RA PRESSURE: 3 MMHG
ECHO LA DIAMETER INDEX: 1.84 CM/M2
ECHO LA DIAMETER: 3.6 CM
ECHO LA TO AORTIC ROOT RATIO: 1.38
ECHO LA VOL 2C: 72 ML (ref 22–52)
ECHO LA VOL 2C: 77 ML (ref 22–52)
ECHO LA VOL 4C: 61 ML (ref 22–52)
ECHO LA VOL 4C: 65 ML (ref 22–52)
ECHO LA VOLUME AREA LENGTH: 72 ML
ECHO LA VOLUME INDEX AREA LENGTH: 37 ML/M2 (ref 16–34)
ECHO LV E' LATERAL VELOCITY: 5 CM/S
ECHO LV E' SEPTAL VELOCITY: 5 CM/S
ECHO LV EDV A2C: 121 ML
ECHO LV EDV A4C: 147 ML
ECHO LV EDV BP: 133 ML (ref 56–104)
ECHO LV EDV INDEX A4C: 75 ML/M2
ECHO LV EDV INDEX BP: 68 ML/M2
ECHO LV EDV NDEX A2C: 62 ML/M2
ECHO LV EJECTION FRACTION A2C: 40 %
ECHO LV EJECTION FRACTION A4C: 59 %
ECHO LV ESV A2C: 73 ML
ECHO LV ESV A4C: 59 ML
ECHO LV ESV BP: 70 ML (ref 19–49)
ECHO LV ESV INDEX A2C: 37 ML/M2
ECHO LV ESV INDEX A4C: 30 ML/M2
ECHO LV ESV INDEX BP: 36 ML/M2
ECHO LV INTERNAL DIMENSION DIASTOLIC: 2 CM (ref 3.9–5.3)
ECHO LV INTERNAL DIMENSION DIASTOLIC: 5.3 CM (ref 3.9–5.3)
ECHO LV INTERNAL DIMENSION SYSTOLIC INDEX: 1.99 CM/M2
ECHO LV INTERNAL DIMENSION SYSTOLIC: 3.9 CM
ECHO LV IVSD: 0.7 CM (ref 0.6–0.9)
ECHO LV POSTERIOR WALL DIASTOLIC: 1.1 CM (ref 0.6–0.9)
ECHO LVOT AREA: 3.1 CM2
ECHO LVOT AV VTI INDEX: 0.71
ECHO LVOT DIAM: 2 CM
ECHO LVOT MEAN GRADIENT: 1 MMHG
ECHO LVOT PEAK GRADIENT: 3 MMHG
ECHO LVOT PEAK VELOCITY: 0.8 M/S
ECHO LVOT STROKE VOLUME INDEX: 27.7 ML/M2
ECHO LVOT SV: 54.3 ML
ECHO LVOT VTI: 17.3 CM
ECHO MV A VELOCITY: 1.46 M/S
ECHO MV AREA PHT: 4.3 CM2
ECHO MV E DECELERATION TIME (DT): 174.5 MS
ECHO MV E VELOCITY: 1.38 M/S
ECHO MV E/A RATIO: 0.95
ECHO MV E/E' LATERAL: 27.6
ECHO MV E/E' RATIO (AVERAGED): 27.6
ECHO MV E/E' SEPTAL: 27.6
ECHO MV PRESSURE HALF TIME (PHT): 50.6 MS
ECHO PV MAX VELOCITY: 0.8 M/S
ECHO PV PEAK GRADIENT: 3 MMHG
ECHO RIGHT VENTRICULAR SYSTOLIC PRESSURE (RVSP): 38 MMHG
ECHO RV FREE WALL PEAK S': 156 CM/S
ECHO RV LONGITUDINAL DIMENSION: 3.6 CM
ECHO RV MID DIMENSION: 2.5 CM
ECHO RV TAPSE: 1.7 CM (ref 1.7–?)
ECHO TV REGURGITANT MAX VELOCITY: 2.96 M/S
ECHO TV REGURGITANT PEAK GRADIENT: 35 MMHG
ERYTHROCYTE [DISTWIDTH] IN BLOOD BY AUTOMATED COUNT: 17.4 % (ref 11.5–14.5)
ETEC ELTA+ESTB GENES STL QL NAA+PROBE: NEGATIVE
GLUCOSE BLD STRIP.AUTO-MCNC: 180 MG/DL (ref 65–117)
GLUCOSE BLD STRIP.AUTO-MCNC: 258 MG/DL (ref 65–117)
GLUCOSE BLD STRIP.AUTO-MCNC: 258 MG/DL (ref 65–117)
GLUCOSE SERPL-MCNC: 130 MG/DL (ref 65–100)
HCT VFR BLD AUTO: 23 % (ref 35–47)
HGB BLD-MCNC: 7.2 G/DL (ref 11.5–16)
IRON SATN MFR SERPL: 68 % (ref 20–50)
IRON SERPL-MCNC: 67 UG/DL (ref 35–150)
MCH RBC QN AUTO: 28.8 PG (ref 26–34)
MCHC RBC AUTO-ENTMCNC: 31.3 G/DL (ref 30–36.5)
MCV RBC AUTO: 92 FL (ref 80–99)
NRBC # BLD: 0 K/UL (ref 0–0.01)
NRBC BLD-RTO: 0 PER 100 WBC
P SHIGELLOIDES DNA STL QL NAA+PROBE: NEGATIVE
PLATELET # BLD AUTO: 262 K/UL (ref 150–400)
PMV BLD AUTO: 9.5 FL (ref 8.9–12.9)
POTASSIUM SERPL-SCNC: 3.6 MMOL/L (ref 3.5–5.1)
RBC # BLD AUTO: 2.5 M/UL (ref 3.8–5.2)
SALMONELLA SP SPAO STL QL NAA+PROBE: NEGATIVE
SERVICE CMNT-IMP: ABNORMAL
SHIGELLA SP+EIEC IPAH STL QL NAA+PROBE: NEGATIVE
SODIUM SERPL-SCNC: 138 MMOL/L (ref 136–145)
TIBC SERPL-MCNC: 99 UG/DL (ref 250–450)
V CHOL+PARA+VUL DNA STL QL NAA+NON-PROBE: NEGATIVE
VANCOMYCIN SERPL-MCNC: 20.9 UG/ML
WBC # BLD AUTO: 7.1 K/UL (ref 3.6–11)
Y ENTEROCOL DNA STL QL NAA+NON-PROBE: NEGATIVE

## 2023-08-22 PROCEDURE — 83550 IRON BINDING TEST: CPT

## 2023-08-22 PROCEDURE — 97116 GAIT TRAINING THERAPY: CPT

## 2023-08-22 PROCEDURE — 2580000003 HC RX 258: Performed by: STUDENT IN AN ORGANIZED HEALTH CARE EDUCATION/TRAINING PROGRAM

## 2023-08-22 PROCEDURE — 82962 GLUCOSE BLOOD TEST: CPT

## 2023-08-22 PROCEDURE — 2580000003 HC RX 258: Performed by: INTERNAL MEDICINE

## 2023-08-22 PROCEDURE — 80202 ASSAY OF VANCOMYCIN: CPT

## 2023-08-22 PROCEDURE — A4216 STERILE WATER/SALINE, 10 ML: HCPCS | Performed by: INTERNAL MEDICINE

## 2023-08-22 PROCEDURE — 6370000000 HC RX 637 (ALT 250 FOR IP): Performed by: NURSE PRACTITIONER

## 2023-08-22 PROCEDURE — 6370000000 HC RX 637 (ALT 250 FOR IP): Performed by: INTERNAL MEDICINE

## 2023-08-22 PROCEDURE — 90945 DIALYSIS ONE EVALUATION: CPT

## 2023-08-22 PROCEDURE — 85027 COMPLETE CBC AUTOMATED: CPT

## 2023-08-22 PROCEDURE — C9113 INJ PANTOPRAZOLE SODIUM, VIA: HCPCS | Performed by: INTERNAL MEDICINE

## 2023-08-22 PROCEDURE — 80048 BASIC METABOLIC PNL TOTAL CA: CPT

## 2023-08-22 PROCEDURE — 6360000002 HC RX W HCPCS: Performed by: INTERNAL MEDICINE

## 2023-08-22 PROCEDURE — 2060000000 HC ICU INTERMEDIATE R&B

## 2023-08-22 PROCEDURE — 36415 COLL VENOUS BLD VENIPUNCTURE: CPT

## 2023-08-22 PROCEDURE — 97530 THERAPEUTIC ACTIVITIES: CPT

## 2023-08-22 PROCEDURE — 6370000000 HC RX 637 (ALT 250 FOR IP): Performed by: STUDENT IN AN ORGANIZED HEALTH CARE EDUCATION/TRAINING PROGRAM

## 2023-08-22 PROCEDURE — 2500000003 HC RX 250 WO HCPCS: Performed by: INTERNAL MEDICINE

## 2023-08-22 PROCEDURE — 83540 ASSAY OF IRON: CPT

## 2023-08-22 RX ADMIN — Medication 4 UNITS: at 17:06

## 2023-08-22 RX ADMIN — CARVEDILOL 12.5 MG: 12.5 TABLET, FILM COATED ORAL at 08:37

## 2023-08-22 RX ADMIN — SODIUM CHLORIDE, SODIUM LACTATE, CALCIUM CHLORIDE, MAGNESIUM CHLORIDE AND DEXTROSE 6000 ML: 2.5; 538; 448; 18.3; 5.08 INJECTION, SOLUTION INTRAPERITONEAL at 15:55

## 2023-08-22 RX ADMIN — PIPERACILLIN AND TAZOBACTAM 3375 MG: 3; .375 INJECTION, POWDER, LYOPHILIZED, FOR SOLUTION INTRAVENOUS at 14:03

## 2023-08-22 RX ADMIN — DULOXETINE 20 MG: 20 CAPSULE, DELAYED RELEASE ORAL at 20:37

## 2023-08-22 RX ADMIN — SODIUM CHLORIDE, SODIUM LACTATE, CALCIUM CHLORIDE, MAGNESIUM CHLORIDE AND DEXTROSE 6000 ML: 2.5; 538; 448; 18.3; 5.08 INJECTION, SOLUTION INTRAPERITONEAL at 15:57

## 2023-08-22 RX ADMIN — DULOXETINE 20 MG: 20 CAPSULE, DELAYED RELEASE ORAL at 08:37

## 2023-08-22 RX ADMIN — POTASSIUM BICARBONATE 40 MEQ: 782 TABLET, EFFERVESCENT ORAL at 20:37

## 2023-08-22 RX ADMIN — OXYCODONE HYDROCHLORIDE 5 MG: 5 TABLET ORAL at 13:09

## 2023-08-22 RX ADMIN — MONTELUKAST 10 MG: 10 TABLET, FILM COATED ORAL at 20:37

## 2023-08-22 RX ADMIN — PIPERACILLIN AND TAZOBACTAM 3375 MG: 3; .375 INJECTION, POWDER, LYOPHILIZED, FOR SOLUTION INTRAVENOUS at 01:45

## 2023-08-22 RX ADMIN — CARVEDILOL 12.5 MG: 12.5 TABLET, FILM COATED ORAL at 17:02

## 2023-08-22 RX ADMIN — POTASSIUM BICARBONATE 40 MEQ: 782 TABLET, EFFERVESCENT ORAL at 08:37

## 2023-08-22 RX ADMIN — FOLIC ACID 1 MG: 5 INJECTION, SOLUTION INTRAMUSCULAR; INTRAVENOUS; SUBCUTANEOUS at 08:39

## 2023-08-22 RX ADMIN — OXYCODONE HYDROCHLORIDE 5 MG: 5 TABLET ORAL at 07:01

## 2023-08-22 RX ADMIN — PANTOPRAZOLE SODIUM 40 MG: 40 INJECTION, POWDER, FOR SOLUTION INTRAVENOUS at 01:45

## 2023-08-22 RX ADMIN — ROSUVASTATIN 40 MG: 40 TABLET, FILM COATED ORAL at 08:37

## 2023-08-22 RX ADMIN — PANTOPRAZOLE SODIUM 40 MG: 40 INJECTION, POWDER, FOR SOLUTION INTRAVENOUS at 13:09

## 2023-08-22 RX ADMIN — GENTAMICIN SULFATE: 1 CREAM TOPICAL at 15:47

## 2023-08-22 RX ADMIN — OXYCODONE HYDROCHLORIDE 5 MG: 5 TABLET ORAL at 23:58

## 2023-08-22 RX ADMIN — Medication 10 ML: at 09:00

## 2023-08-22 ASSESSMENT — PAIN DESCRIPTION - LOCATION
LOCATION: BACK

## 2023-08-22 ASSESSMENT — PAIN SCALES - GENERAL
PAINLEVEL_OUTOF10: 7
PAINLEVEL_OUTOF10: 8
PAINLEVEL_OUTOF10: 0
PAINLEVEL_OUTOF10: 4
PAINLEVEL_OUTOF10: 4
PAINLEVEL_OUTOF10: 5
PAINLEVEL_OUTOF10: 8
PAINLEVEL_OUTOF10: 0
PAINLEVEL_OUTOF10: 4
PAINLEVEL_OUTOF10: 7

## 2023-08-22 ASSESSMENT — PAIN DESCRIPTION - ORIENTATION
ORIENTATION: MID
ORIENTATION: MID

## 2023-08-22 ASSESSMENT — PAIN DESCRIPTION - DESCRIPTORS
DESCRIPTORS: ACHING;SPASM
DESCRIPTORS: SPASM

## 2023-08-23 DIAGNOSIS — I21.4 ACUTE MYOCARDIAL INFARCTION, SUBENDOCARDIAL INFARCTION, INITIAL EPISODE OF CARE (HCC): Primary | ICD-10-CM

## 2023-08-23 LAB
ANION GAP SERPL CALC-SCNC: 6 MMOL/L (ref 5–15)
BACTERIA SPEC CULT: NORMAL
BACTERIA SPEC CULT: NORMAL
BUN SERPL-MCNC: 22 MG/DL (ref 6–20)
BUN/CREAT SERPL: 4 (ref 12–20)
CALCIUM SERPL-MCNC: 7.9 MG/DL (ref 8.5–10.1)
CHLORIDE SERPL-SCNC: 101 MMOL/L (ref 97–108)
CO2 SERPL-SCNC: 29 MMOL/L (ref 21–32)
CREAT SERPL-MCNC: 5.6 MG/DL (ref 0.55–1.02)
ECHO BSA: 2.02 M2
ERYTHROCYTE [DISTWIDTH] IN BLOOD BY AUTOMATED COUNT: 17.5 % (ref 11.5–14.5)
GLUCOSE BLD STRIP.AUTO-MCNC: 146 MG/DL (ref 65–117)
GLUCOSE BLD STRIP.AUTO-MCNC: 170 MG/DL (ref 65–117)
GLUCOSE BLD STRIP.AUTO-MCNC: 228 MG/DL (ref 65–117)
GLUCOSE BLD STRIP.AUTO-MCNC: 274 MG/DL (ref 65–117)
GLUCOSE BLD STRIP.AUTO-MCNC: 326 MG/DL (ref 65–117)
GLUCOSE SERPL-MCNC: 173 MG/DL (ref 65–100)
GRAM STN SPEC: NORMAL
GRAM STN SPEC: NORMAL
HCT VFR BLD AUTO: 23.9 % (ref 35–47)
HGB BLD-MCNC: 7.4 G/DL (ref 11.5–16)
MCH RBC QN AUTO: 28.7 PG (ref 26–34)
MCHC RBC AUTO-ENTMCNC: 31 G/DL (ref 30–36.5)
MCV RBC AUTO: 92.6 FL (ref 80–99)
NRBC # BLD: 0 K/UL (ref 0–0.01)
NRBC BLD-RTO: 0 PER 100 WBC
PLATELET # BLD AUTO: 273 K/UL (ref 150–400)
PMV BLD AUTO: 9.5 FL (ref 8.9–12.9)
POTASSIUM SERPL-SCNC: 4 MMOL/L (ref 3.5–5.1)
RBC # BLD AUTO: 2.58 M/UL (ref 3.8–5.2)
SERVICE CMNT-IMP: ABNORMAL
SERVICE CMNT-IMP: NORMAL
SERVICE CMNT-IMP: NORMAL
SODIUM SERPL-SCNC: 136 MMOL/L (ref 136–145)
WBC # BLD AUTO: 6.4 K/UL (ref 3.6–11)

## 2023-08-23 PROCEDURE — 97535 SELF CARE MNGMENT TRAINING: CPT

## 2023-08-23 PROCEDURE — B2151ZZ FLUOROSCOPY OF LEFT HEART USING LOW OSMOLAR CONTRAST: ICD-10-PCS | Performed by: INTERNAL MEDICINE

## 2023-08-23 PROCEDURE — 2580000003 HC RX 258: Performed by: INTERNAL MEDICINE

## 2023-08-23 PROCEDURE — 85027 COMPLETE CBC AUTOMATED: CPT

## 2023-08-23 PROCEDURE — C1713 ANCHOR/SCREW BN/BN,TIS/BN: HCPCS | Performed by: INTERNAL MEDICINE

## 2023-08-23 PROCEDURE — 6360000002 HC RX W HCPCS: Performed by: INTERNAL MEDICINE

## 2023-08-23 PROCEDURE — 6370000000 HC RX 637 (ALT 250 FOR IP): Performed by: INTERNAL MEDICINE

## 2023-08-23 PROCEDURE — 80048 BASIC METABOLIC PNL TOTAL CA: CPT

## 2023-08-23 PROCEDURE — 6370000000 HC RX 637 (ALT 250 FOR IP): Performed by: STUDENT IN AN ORGANIZED HEALTH CARE EDUCATION/TRAINING PROGRAM

## 2023-08-23 PROCEDURE — 2500000003 HC RX 250 WO HCPCS: Performed by: INTERNAL MEDICINE

## 2023-08-23 PROCEDURE — 36415 COLL VENOUS BLD VENIPUNCTURE: CPT

## 2023-08-23 PROCEDURE — 2060000000 HC ICU INTERMEDIATE R&B

## 2023-08-23 PROCEDURE — B2111ZZ FLUOROSCOPY OF MULTIPLE CORONARY ARTERIES USING LOW OSMOLAR CONTRAST: ICD-10-PCS | Performed by: INTERNAL MEDICINE

## 2023-08-23 PROCEDURE — 6370000000 HC RX 637 (ALT 250 FOR IP): Performed by: NURSE PRACTITIONER

## 2023-08-23 PROCEDURE — 2580000003 HC RX 258: Performed by: STUDENT IN AN ORGANIZED HEALTH CARE EDUCATION/TRAINING PROGRAM

## 2023-08-23 PROCEDURE — 93458 L HRT ARTERY/VENTRICLE ANGIO: CPT | Performed by: INTERNAL MEDICINE

## 2023-08-23 PROCEDURE — 4A023N7 MEASUREMENT OF CARDIAC SAMPLING AND PRESSURE, LEFT HEART, PERCUTANEOUS APPROACH: ICD-10-PCS | Performed by: INTERNAL MEDICINE

## 2023-08-23 PROCEDURE — 87040 BLOOD CULTURE FOR BACTERIA: CPT

## 2023-08-23 PROCEDURE — A4216 STERILE WATER/SALINE, 10 ML: HCPCS | Performed by: INTERNAL MEDICINE

## 2023-08-23 PROCEDURE — C9113 INJ PANTOPRAZOLE SODIUM, VIA: HCPCS | Performed by: INTERNAL MEDICINE

## 2023-08-23 PROCEDURE — 82962 GLUCOSE BLOOD TEST: CPT

## 2023-08-23 PROCEDURE — 6360000004 HC RX CONTRAST MEDICATION: Performed by: INTERNAL MEDICINE

## 2023-08-23 PROCEDURE — 2709999900 HC NON-CHARGEABLE SUPPLY: Performed by: INTERNAL MEDICINE

## 2023-08-23 PROCEDURE — C1894 INTRO/SHEATH, NON-LASER: HCPCS | Performed by: INTERNAL MEDICINE

## 2023-08-23 RX ORDER — MIDAZOLAM HYDROCHLORIDE 1 MG/ML
INJECTION INTRAMUSCULAR; INTRAVENOUS PRN
Status: DISCONTINUED | OUTPATIENT
Start: 2023-08-23 | End: 2023-08-23 | Stop reason: HOSPADM

## 2023-08-23 RX ORDER — SODIUM CHLORIDE 0.9 % (FLUSH) 0.9 %
5-40 SYRINGE (ML) INJECTION EVERY 12 HOURS SCHEDULED
Status: DISCONTINUED | OUTPATIENT
Start: 2023-08-23 | End: 2023-09-05

## 2023-08-23 RX ORDER — DIPHENHYDRAMINE HYDROCHLORIDE 50 MG/ML
INJECTION INTRAMUSCULAR; INTRAVENOUS PRN
Status: DISCONTINUED | OUTPATIENT
Start: 2023-08-23 | End: 2023-08-23 | Stop reason: HOSPADM

## 2023-08-23 RX ORDER — METRONIDAZOLE 250 MG/1
500 TABLET ORAL EVERY 12 HOURS SCHEDULED
Status: COMPLETED | OUTPATIENT
Start: 2023-08-23 | End: 2023-09-06

## 2023-08-23 RX ORDER — FENTANYL CITRATE 50 UG/ML
INJECTION, SOLUTION INTRAMUSCULAR; INTRAVENOUS PRN
Status: DISCONTINUED | OUTPATIENT
Start: 2023-08-23 | End: 2023-08-23 | Stop reason: HOSPADM

## 2023-08-23 RX ORDER — CLARITHROMYCIN 500 MG/1
500 TABLET, COATED ORAL EVERY 12 HOURS SCHEDULED
Status: DISCONTINUED | OUTPATIENT
Start: 2023-08-23 | End: 2023-08-24

## 2023-08-23 RX ORDER — LIDOCAINE HYDROCHLORIDE 10 MG/ML
INJECTION, SOLUTION INFILTRATION; PERINEURAL PRN
Status: DISCONTINUED | OUTPATIENT
Start: 2023-08-23 | End: 2023-08-23 | Stop reason: HOSPADM

## 2023-08-23 RX ORDER — VERAPAMIL HYDROCHLORIDE 2.5 MG/ML
INJECTION, SOLUTION INTRAVENOUS PRN
Status: DISCONTINUED | OUTPATIENT
Start: 2023-08-23 | End: 2023-08-23 | Stop reason: HOSPADM

## 2023-08-23 RX ADMIN — PIPERACILLIN AND TAZOBACTAM 3375 MG: 3; .375 INJECTION, POWDER, LYOPHILIZED, FOR SOLUTION INTRAVENOUS at 01:33

## 2023-08-23 RX ADMIN — PANTOPRAZOLE SODIUM 40 MG: 40 INJECTION, POWDER, FOR SOLUTION INTRAVENOUS at 01:33

## 2023-08-23 RX ADMIN — Medication 10 ML: at 20:57

## 2023-08-23 RX ADMIN — SODIUM CHLORIDE, SODIUM LACTATE, CALCIUM CHLORIDE, MAGNESIUM CHLORIDE AND DEXTROSE 6000 ML: 2.5; 538; 448; 18.3; 5.08 INJECTION, SOLUTION INTRAPERITONEAL at 16:54

## 2023-08-23 RX ADMIN — CARVEDILOL 12.5 MG: 12.5 TABLET, FILM COATED ORAL at 09:06

## 2023-08-23 RX ADMIN — POTASSIUM BICARBONATE 40 MEQ: 782 TABLET, EFFERVESCENT ORAL at 09:06

## 2023-08-23 RX ADMIN — PANTOPRAZOLE SODIUM 40 MG: 40 INJECTION, POWDER, FOR SOLUTION INTRAVENOUS at 14:02

## 2023-08-23 RX ADMIN — SODIUM CHLORIDE, PRESERVATIVE FREE 10 ML: 5 INJECTION INTRAVENOUS at 20:57

## 2023-08-23 RX ADMIN — PIPERACILLIN AND TAZOBACTAM 3375 MG: 3; .375 INJECTION, POWDER, LYOPHILIZED, FOR SOLUTION INTRAVENOUS at 14:02

## 2023-08-23 RX ADMIN — Medication 4 UNITS: at 20:51

## 2023-08-23 RX ADMIN — OXYCODONE HYDROCHLORIDE 5 MG: 5 TABLET ORAL at 14:02

## 2023-08-23 RX ADMIN — Medication 4 UNITS: at 17:24

## 2023-08-23 RX ADMIN — ERYTHROPOIETIN 20000 UNITS: 20000 INJECTION, SOLUTION INTRAVENOUS; SUBCUTANEOUS at 09:06

## 2023-08-23 RX ADMIN — METRONIDAZOLE 500 MG: 250 TABLET ORAL at 20:55

## 2023-08-23 RX ADMIN — OXYCODONE HYDROCHLORIDE 5 MG: 5 TABLET ORAL at 20:55

## 2023-08-23 RX ADMIN — MONTELUKAST 10 MG: 10 TABLET, FILM COATED ORAL at 20:54

## 2023-08-23 RX ADMIN — CLARITHROMYCIN 500 MG: 500 TABLET, FILM COATED ORAL at 20:54

## 2023-08-23 RX ADMIN — GENTAMICIN SULFATE: 1 CREAM TOPICAL at 16:54

## 2023-08-23 RX ADMIN — DULOXETINE 20 MG: 20 CAPSULE, DELAYED RELEASE ORAL at 09:06

## 2023-08-23 RX ADMIN — FOLIC ACID 1 MG: 5 INJECTION, SOLUTION INTRAMUSCULAR; INTRAVENOUS; SUBCUTANEOUS at 09:05

## 2023-08-23 RX ADMIN — CARVEDILOL 12.5 MG: 12.5 TABLET, FILM COATED ORAL at 17:25

## 2023-08-23 RX ADMIN — Medication 2 UNITS: at 11:46

## 2023-08-23 RX ADMIN — ROSUVASTATIN 40 MG: 40 TABLET, FILM COATED ORAL at 09:06

## 2023-08-23 RX ADMIN — POTASSIUM BICARBONATE 40 MEQ: 782 TABLET, EFFERVESCENT ORAL at 20:57

## 2023-08-23 RX ADMIN — DULOXETINE 20 MG: 20 CAPSULE, DELAYED RELEASE ORAL at 20:56

## 2023-08-23 ASSESSMENT — PAIN SCALES - GENERAL
PAINLEVEL_OUTOF10: 0
PAINLEVEL_OUTOF10: 0
PAINLEVEL_OUTOF10: 4
PAINLEVEL_OUTOF10: 7

## 2023-08-23 ASSESSMENT — PAIN DESCRIPTION - PAIN TYPE: TYPE: CHRONIC PAIN

## 2023-08-23 ASSESSMENT — PAIN DESCRIPTION - LOCATION: LOCATION: BACK

## 2023-08-23 NOTE — FLOWSHEET NOTE
[facility]   PD DISCONNECTION NOTE     08/23/23 0630   Peritoneal Dialysis Catheter Mid lower abdomen   No placement date or time found. Catheter Location: Mid lower abdomen   Status Clamped   Dressing Status Clean, dry & intact   Dressing Gauze   Date of Last Dressing Change 08/22/23   Dialysis Type Continuous cycling   Post-Treatment (Cycler)   Average Dwell Time (Hours:Minutes) 1:24   Lost Dwell Time (Hours:Minutes) 0:04   Effluent Appearance Clear;Yellow   PD Output (mL) 1509 mL  (id 168ml +5954ni=2199 NET OUTPUT)     Primary RN SBAR: Chiquita Barrett RN  Comments: Pt orders, notes, labs and code status reviewed. Time out complete. Tx complete. Prior to disconnection, two minute Alcavis scrub/soak followed by sterile mini cap application. Line secured to abdomen. UF reviewed with Dr. Alyssa Odonnell, no changes at this time. Pt for C Cath this am.   Upon departure, bed in lowest position, call bell and personal belongings within reach.  Education pre/post

## 2023-08-23 NOTE — CARE COORDINATION
TRANSITION OF CARE  RUR--24%  Disposition--Home with family assist.  Resume PD for ESRD. Consults; cardiology, ID  Transport--Family    Patient's primary family contact:son Ezio Jackson [phone]    1st  Medicare IM Letter:8/19/23   2nd Medicare IM Letter:      Currently in ICU with plan to downgrade to another unit. Per previous CM, patient was recently discharged from [facility].

## 2023-08-23 NOTE — FLOWSHEET NOTE
[facility]   HD INITIATION NOTE       08/23/23 1700   Vitals   BP (!) 156/84   Pulse 97   Respirations 19   SpO2 95 %   Peritoneal Dialysis Catheter Mid lower abdomen   No placement date or time found. Catheter Location: Mid lower abdomen   Status Accessed   Site Condition Clean, dry, intact   Dressing Status New dressing applied   Dressing Gauze   Date of Last Dressing Change 08/23/23   Dialysis Type Continuous cycling   Exit Site Condition Excellent   Catheter Care Given Yes   Cycler   Informed Consent    (chronic consent applies)   Total Volume Programmed 63866 mL   Therapy Time (Hours:Minutes) 9:30   Cycler Type Jung HomeChoice   Fill Volume 2300 mL   Last Fill Volume 0 mL   Dextrose Setting Same (Nonextraneal)   I Drain Alarm 0 mL   Number of Cycles 5   Bag Volume 6000 mL   Number of Bags Used 2   Dianeal Solution   (2.5% Dextrose in 6000ml)     Primary RN Ross Mars RN  Patient Education: Pt orders, notes, labs and code status reviewed. Time out complete. PD initiated as per md order. Education provided to call Valentine Herman if patient requires room change overnight.    Hepatitis B Surface Ag   Date/Time Value Ref Range Status   07/19/2023 12:04 PM <0.10 Index Final     Hep B S Ab   Date/Time Value Ref Range Status   08/20/2023 10:01 AM <3.10 mIU/mL Final

## 2023-08-24 LAB
GLUCOSE BLD STRIP.AUTO-MCNC: 162 MG/DL (ref 65–117)
GLUCOSE BLD STRIP.AUTO-MCNC: 208 MG/DL (ref 65–117)
GLUCOSE BLD STRIP.AUTO-MCNC: 235 MG/DL (ref 65–117)
GLUCOSE BLD STRIP.AUTO-MCNC: 248 MG/DL (ref 65–117)
SERVICE CMNT-IMP: ABNORMAL
VANCOMYCIN SERPL-MCNC: 17.2 UG/ML

## 2023-08-24 PROCEDURE — 6370000000 HC RX 637 (ALT 250 FOR IP): Performed by: STUDENT IN AN ORGANIZED HEALTH CARE EDUCATION/TRAINING PROGRAM

## 2023-08-24 PROCEDURE — 2580000003 HC RX 258: Performed by: INTERNAL MEDICINE

## 2023-08-24 PROCEDURE — C9113 INJ PANTOPRAZOLE SODIUM, VIA: HCPCS | Performed by: INTERNAL MEDICINE

## 2023-08-24 PROCEDURE — 97535 SELF CARE MNGMENT TRAINING: CPT

## 2023-08-24 PROCEDURE — 6360000002 HC RX W HCPCS: Performed by: NURSE PRACTITIONER

## 2023-08-24 PROCEDURE — 2060000000 HC ICU INTERMEDIATE R&B

## 2023-08-24 PROCEDURE — 82962 GLUCOSE BLOOD TEST: CPT

## 2023-08-24 PROCEDURE — 6370000000 HC RX 637 (ALT 250 FOR IP): Performed by: NURSE PRACTITIONER

## 2023-08-24 PROCEDURE — 6370000000 HC RX 637 (ALT 250 FOR IP): Performed by: HOSPITALIST

## 2023-08-24 PROCEDURE — 80202 ASSAY OF VANCOMYCIN: CPT

## 2023-08-24 PROCEDURE — 36415 COLL VENOUS BLD VENIPUNCTURE: CPT

## 2023-08-24 PROCEDURE — A4216 STERILE WATER/SALINE, 10 ML: HCPCS | Performed by: INTERNAL MEDICINE

## 2023-08-24 PROCEDURE — 2580000003 HC RX 258: Performed by: STUDENT IN AN ORGANIZED HEALTH CARE EDUCATION/TRAINING PROGRAM

## 2023-08-24 PROCEDURE — 97116 GAIT TRAINING THERAPY: CPT

## 2023-08-24 PROCEDURE — 6370000000 HC RX 637 (ALT 250 FOR IP): Performed by: INTERNAL MEDICINE

## 2023-08-24 PROCEDURE — 6360000002 HC RX W HCPCS: Performed by: INTERNAL MEDICINE

## 2023-08-24 PROCEDURE — 90945 DIALYSIS ONE EVALUATION: CPT

## 2023-08-24 PROCEDURE — 99223 1ST HOSP IP/OBS HIGH 75: CPT | Performed by: INTERNAL MEDICINE

## 2023-08-24 RX ORDER — CLARITHROMYCIN 500 MG/1
250 TABLET, COATED ORAL EVERY 12 HOURS SCHEDULED
Status: COMPLETED | OUTPATIENT
Start: 2023-08-24 | End: 2023-09-06

## 2023-08-24 RX ORDER — PANTOPRAZOLE SODIUM 40 MG/1
40 TABLET, DELAYED RELEASE ORAL
Status: DISCONTINUED | OUTPATIENT
Start: 2023-08-24 | End: 2023-09-07

## 2023-08-24 RX ORDER — BUMETANIDE 1 MG/1
2 TABLET ORAL DAILY
Status: DISCONTINUED | OUTPATIENT
Start: 2023-08-24 | End: 2023-09-07

## 2023-08-24 RX ADMIN — IRON SUCROSE 200 MG: 20 INJECTION, SOLUTION INTRAVENOUS at 17:28

## 2023-08-24 RX ADMIN — Medication 10 ML: at 23:28

## 2023-08-24 RX ADMIN — CARVEDILOL 12.5 MG: 12.5 TABLET, FILM COATED ORAL at 08:57

## 2023-08-24 RX ADMIN — OXYCODONE HYDROCHLORIDE 5 MG: 5 TABLET ORAL at 12:31

## 2023-08-24 RX ADMIN — DULOXETINE 20 MG: 20 CAPSULE, DELAYED RELEASE ORAL at 23:28

## 2023-08-24 RX ADMIN — CARVEDILOL 12.5 MG: 12.5 TABLET, FILM COATED ORAL at 17:28

## 2023-08-24 RX ADMIN — CLARITHROMYCIN 500 MG: 500 TABLET, FILM COATED ORAL at 08:57

## 2023-08-24 RX ADMIN — OXYCODONE HYDROCHLORIDE 5 MG: 5 TABLET ORAL at 18:48

## 2023-08-24 RX ADMIN — GENTAMICIN SULFATE: 1 CREAM TOPICAL at 16:21

## 2023-08-24 RX ADMIN — PANTOPRAZOLE SODIUM 40 MG: 40 TABLET, DELAYED RELEASE ORAL at 17:28

## 2023-08-24 RX ADMIN — Medication 1 MG: at 08:57

## 2023-08-24 RX ADMIN — OXYCODONE HYDROCHLORIDE 5 MG: 5 TABLET ORAL at 05:26

## 2023-08-24 RX ADMIN — Medication 2 UNITS: at 17:28

## 2023-08-24 RX ADMIN — AMLODIPINE BESYLATE 10 MG: 5 TABLET ORAL at 08:57

## 2023-08-24 RX ADMIN — PIPERACILLIN AND TAZOBACTAM 3375 MG: 3; .375 INJECTION, POWDER, LYOPHILIZED, FOR SOLUTION INTRAVENOUS at 01:54

## 2023-08-24 RX ADMIN — POTASSIUM BICARBONATE 40 MEQ: 782 TABLET, EFFERVESCENT ORAL at 23:27

## 2023-08-24 RX ADMIN — PANTOPRAZOLE SODIUM 40 MG: 40 INJECTION, POWDER, FOR SOLUTION INTRAVENOUS at 01:30

## 2023-08-24 RX ADMIN — SODIUM CHLORIDE, PRESERVATIVE FREE 10 ML: 5 INJECTION INTRAVENOUS at 23:00

## 2023-08-24 RX ADMIN — Medication 2 UNITS: at 12:30

## 2023-08-24 RX ADMIN — SODIUM CHLORIDE, SODIUM LACTATE, CALCIUM CHLORIDE, MAGNESIUM CHLORIDE AND DEXTROSE 6000 ML: 2.5; 538; 448; 18.3; 5.08 INJECTION, SOLUTION INTRAPERITONEAL at 16:19

## 2023-08-24 RX ADMIN — MONTELUKAST 10 MG: 10 TABLET, FILM COATED ORAL at 23:27

## 2023-08-24 RX ADMIN — DULOXETINE 20 MG: 20 CAPSULE, DELAYED RELEASE ORAL at 08:57

## 2023-08-24 RX ADMIN — POTASSIUM BICARBONATE 40 MEQ: 782 TABLET, EFFERVESCENT ORAL at 08:57

## 2023-08-24 RX ADMIN — BUMETANIDE 2 MG: 1 TABLET ORAL at 08:57

## 2023-08-24 RX ADMIN — METRONIDAZOLE 500 MG: 250 TABLET ORAL at 23:27

## 2023-08-24 RX ADMIN — SODIUM CHLORIDE, SODIUM LACTATE, CALCIUM CHLORIDE, MAGNESIUM CHLORIDE AND DEXTROSE 6000 ML: 2.5; 538; 448; 18.3; 5.08 INJECTION, SOLUTION INTRAPERITONEAL at 16:20

## 2023-08-24 RX ADMIN — METRONIDAZOLE 500 MG: 250 TABLET ORAL at 08:57

## 2023-08-24 RX ADMIN — ROSUVASTATIN 40 MG: 40 TABLET, FILM COATED ORAL at 08:57

## 2023-08-24 ASSESSMENT — PAIN SCALES - GENERAL
PAINLEVEL_OUTOF10: 7
PAINLEVEL_OUTOF10: 5
PAINLEVEL_OUTOF10: 7
PAINLEVEL_OUTOF10: 4

## 2023-08-24 ASSESSMENT — PAIN DESCRIPTION - ORIENTATION: ORIENTATION: MID

## 2023-08-24 ASSESSMENT — PAIN DESCRIPTION - LOCATION
LOCATION: BACK

## 2023-08-24 ASSESSMENT — PAIN DESCRIPTION - DESCRIPTORS
DESCRIPTORS: ACHING
DESCRIPTORS: ACHING
DESCRIPTORS: SPASM

## 2023-08-24 ASSESSMENT — PAIN DESCRIPTION - PAIN TYPE: TYPE: CHRONIC PAIN

## 2023-08-24 ASSESSMENT — PAIN - FUNCTIONAL ASSESSMENT: PAIN_FUNCTIONAL_ASSESSMENT: ACTIVITIES ARE NOT PREVENTED

## 2023-08-24 NOTE — FLOWSHEET NOTE
PD DISCONNECT    08/24/23 0600   Vitals   BP (!) 143/74   Pulse 90   Respirations 23   SpO2 100 %   Peritoneal Dialysis Catheter Mid lower abdomen   No placement date or time found. Catheter Location: Mid lower abdomen   Status Deaccessed   Dressing Status Clean, dry & intact   Dressing Gauze   Date of Last Dressing Change 08/23/23   Dialysis Type Continuous cycling   Catheter Care Given No   Post-Treatment (Cycler)   Average Dwell Time (Hours:Minutes) 1:22   Lost Dwell Time (Hours:Minutes) 0:12   Effluent Appearance Clear;Yellow   PD Output (mL) 1639 mL  (+ total UF of 1515 no last fill NET UF 1639)        08/24/23 0600   Observations & Evaluations   Level of Consciousness 1   Oriented X 4   Heart Rhythm Regular   Respiratory Quality/Effort Unlabored   O2 Device Nasal cannula   Skin Condition/Temp Dry; Warm   Abdomen Inspection Soft   Edema None   Vital Signs   BP (!) 143/74   Pulse 90   Respirations 23   SpO2 100 %   Pain Assessment   Pain Assessment None - Denies Pain   Technical Checks   ICEBOAT I;C;E;B;O;A;T     0600 One minute alcavis scrub followed by one minute soak and sterile mini cap. Bed in low position call bell within reach.

## 2023-08-25 LAB
ANION GAP SERPL CALC-SCNC: 6 MMOL/L (ref 5–15)
BUN SERPL-MCNC: 26 MG/DL (ref 6–20)
BUN/CREAT SERPL: 4 (ref 12–20)
CALCIUM SERPL-MCNC: 8.2 MG/DL (ref 8.5–10.1)
CHLORIDE SERPL-SCNC: 101 MMOL/L (ref 97–108)
CO2 SERPL-SCNC: 30 MMOL/L (ref 21–32)
COMMENT:: NORMAL
CREAT SERPL-MCNC: 5.95 MG/DL (ref 0.55–1.02)
ERYTHROCYTE [DISTWIDTH] IN BLOOD BY AUTOMATED COUNT: 17.8 % (ref 11.5–14.5)
GLUCOSE BLD STRIP.AUTO-MCNC: 166 MG/DL (ref 65–117)
GLUCOSE BLD STRIP.AUTO-MCNC: 189 MG/DL (ref 65–117)
GLUCOSE BLD STRIP.AUTO-MCNC: 199 MG/DL (ref 65–117)
GLUCOSE BLD STRIP.AUTO-MCNC: 208 MG/DL (ref 65–117)
GLUCOSE BLD STRIP.AUTO-MCNC: 282 MG/DL (ref 65–117)
GLUCOSE SERPL-MCNC: 212 MG/DL (ref 65–100)
HBV SURFACE AG SER QL: <0.1 INDEX
HBV SURFACE AG SER QL: NEGATIVE
HCT VFR BLD AUTO: 27.6 % (ref 35–47)
HGB BLD-MCNC: 8.2 G/DL (ref 11.5–16)
MCH RBC QN AUTO: 28.6 PG (ref 26–34)
MCHC RBC AUTO-ENTMCNC: 29.7 G/DL (ref 30–36.5)
MCV RBC AUTO: 96.2 FL (ref 80–99)
NRBC # BLD: 0 K/UL (ref 0–0.01)
NRBC BLD-RTO: 0 PER 100 WBC
PLATELET # BLD AUTO: 293 K/UL (ref 150–400)
PMV BLD AUTO: 9.7 FL (ref 8.9–12.9)
POTASSIUM SERPL-SCNC: 5 MMOL/L (ref 3.5–5.1)
RBC # BLD AUTO: 2.87 M/UL (ref 3.8–5.2)
SERVICE CMNT-IMP: ABNORMAL
SODIUM SERPL-SCNC: 137 MMOL/L (ref 136–145)
SPECIMEN HOLD: NORMAL
VANCOMYCIN SERPL-MCNC: 15.4 UG/ML
WBC # BLD AUTO: 8.9 K/UL (ref 3.6–11)

## 2023-08-25 PROCEDURE — 6370000000 HC RX 637 (ALT 250 FOR IP): Performed by: HOSPITALIST

## 2023-08-25 PROCEDURE — 6370000000 HC RX 637 (ALT 250 FOR IP): Performed by: NURSE PRACTITIONER

## 2023-08-25 PROCEDURE — 80202 ASSAY OF VANCOMYCIN: CPT

## 2023-08-25 PROCEDURE — 2580000003 HC RX 258: Performed by: INTERNAL MEDICINE

## 2023-08-25 PROCEDURE — 97116 GAIT TRAINING THERAPY: CPT

## 2023-08-25 PROCEDURE — 97530 THERAPEUTIC ACTIVITIES: CPT

## 2023-08-25 PROCEDURE — 82962 GLUCOSE BLOOD TEST: CPT

## 2023-08-25 PROCEDURE — 6360000002 HC RX W HCPCS: Performed by: INTERNAL MEDICINE

## 2023-08-25 PROCEDURE — 2580000003 HC RX 258: Performed by: STUDENT IN AN ORGANIZED HEALTH CARE EDUCATION/TRAINING PROGRAM

## 2023-08-25 PROCEDURE — 6370000000 HC RX 637 (ALT 250 FOR IP): Performed by: INTERNAL MEDICINE

## 2023-08-25 PROCEDURE — 6370000000 HC RX 637 (ALT 250 FOR IP): Performed by: STUDENT IN AN ORGANIZED HEALTH CARE EDUCATION/TRAINING PROGRAM

## 2023-08-25 PROCEDURE — 6360000002 HC RX W HCPCS: Performed by: NURSE PRACTITIONER

## 2023-08-25 PROCEDURE — 87340 HEPATITIS B SURFACE AG IA: CPT

## 2023-08-25 PROCEDURE — 2500000003 HC RX 250 WO HCPCS: Performed by: INTERNAL MEDICINE

## 2023-08-25 PROCEDURE — 2060000000 HC ICU INTERMEDIATE R&B

## 2023-08-25 PROCEDURE — 80048 BASIC METABOLIC PNL TOTAL CA: CPT

## 2023-08-25 PROCEDURE — 36415 COLL VENOUS BLD VENIPUNCTURE: CPT

## 2023-08-25 PROCEDURE — 85027 COMPLETE CBC AUTOMATED: CPT

## 2023-08-25 RX ORDER — POTASSIUM CHLORIDE 750 MG/1
40 TABLET, FILM COATED, EXTENDED RELEASE ORAL 2 TIMES DAILY
Status: DISCONTINUED | OUTPATIENT
Start: 2023-08-25 | End: 2023-08-28

## 2023-08-25 RX ADMIN — OXYCODONE HYDROCHLORIDE 5 MG: 5 TABLET ORAL at 08:34

## 2023-08-25 RX ADMIN — BUMETANIDE 2 MG: 1 TABLET ORAL at 08:34

## 2023-08-25 RX ADMIN — AMLODIPINE BESYLATE 10 MG: 5 TABLET ORAL at 08:34

## 2023-08-25 RX ADMIN — SODIUM CHLORIDE, SODIUM LACTATE, CALCIUM CHLORIDE, MAGNESIUM CHLORIDE AND DEXTROSE 6000 ML: 2.5; 538; 448; 18.3; 5.08 INJECTION, SOLUTION INTRAPERITONEAL at 15:37

## 2023-08-25 RX ADMIN — PANTOPRAZOLE SODIUM 40 MG: 40 TABLET, DELAYED RELEASE ORAL at 17:47

## 2023-08-25 RX ADMIN — BUMETANIDE 1 MG/HR: 0.25 INJECTION INTRAMUSCULAR; INTRAVENOUS at 21:25

## 2023-08-25 RX ADMIN — OXYCODONE HYDROCHLORIDE 5 MG: 5 TABLET ORAL at 01:02

## 2023-08-25 RX ADMIN — CLARITHROMYCIN 250 MG: 500 TABLET, FILM COATED ORAL at 00:24

## 2023-08-25 RX ADMIN — ERYTHROPOIETIN 20000 UNITS: 20000 INJECTION, SOLUTION INTRAVENOUS; SUBCUTANEOUS at 12:37

## 2023-08-25 RX ADMIN — GENTAMICIN SULFATE: 1 CREAM TOPICAL at 15:37

## 2023-08-25 RX ADMIN — MONTELUKAST 10 MG: 10 TABLET, FILM COATED ORAL at 21:28

## 2023-08-25 RX ADMIN — Medication 10 ML: at 21:29

## 2023-08-25 RX ADMIN — METRONIDAZOLE 500 MG: 250 TABLET ORAL at 21:27

## 2023-08-25 RX ADMIN — CARVEDILOL 12.5 MG: 12.5 TABLET, FILM COATED ORAL at 08:34

## 2023-08-25 RX ADMIN — DULOXETINE 20 MG: 20 CAPSULE, DELAYED RELEASE ORAL at 08:34

## 2023-08-25 RX ADMIN — DULOXETINE 20 MG: 20 CAPSULE, DELAYED RELEASE ORAL at 21:28

## 2023-08-25 RX ADMIN — POTASSIUM CHLORIDE 40 MEQ: 750 TABLET, FILM COATED, EXTENDED RELEASE ORAL at 21:28

## 2023-08-25 RX ADMIN — Medication 2 UNITS: at 12:38

## 2023-08-25 RX ADMIN — CLARITHROMYCIN 250 MG: 500 TABLET, FILM COATED ORAL at 12:38

## 2023-08-25 RX ADMIN — BUMETANIDE 1 MG/HR: 0.25 INJECTION INTRAMUSCULAR; INTRAVENOUS at 12:39

## 2023-08-25 RX ADMIN — Medication 1 MG: at 08:34

## 2023-08-25 RX ADMIN — SODIUM CHLORIDE, PRESERVATIVE FREE 10 ML: 5 INJECTION INTRAVENOUS at 08:44

## 2023-08-25 RX ADMIN — CARVEDILOL 12.5 MG: 12.5 TABLET, FILM COATED ORAL at 17:47

## 2023-08-25 RX ADMIN — SODIUM CHLORIDE, PRESERVATIVE FREE 10 ML: 5 INJECTION INTRAVENOUS at 21:29

## 2023-08-25 RX ADMIN — CLARITHROMYCIN 250 MG: 500 TABLET, FILM COATED ORAL at 21:27

## 2023-08-25 RX ADMIN — OXYCODONE HYDROCHLORIDE 5 MG: 5 TABLET ORAL at 18:11

## 2023-08-25 RX ADMIN — ROSUVASTATIN 40 MG: 40 TABLET, FILM COATED ORAL at 08:34

## 2023-08-25 RX ADMIN — Medication 10 ML: at 08:45

## 2023-08-25 RX ADMIN — IRON SUCROSE 200 MG: 20 INJECTION, SOLUTION INTRAVENOUS at 18:11

## 2023-08-25 RX ADMIN — PANTOPRAZOLE SODIUM 40 MG: 40 TABLET, DELAYED RELEASE ORAL at 07:15

## 2023-08-25 RX ADMIN — VANCOMYCIN HYDROCHLORIDE 1250 MG: 1.25 INJECTION, POWDER, LYOPHILIZED, FOR SOLUTION INTRAVENOUS at 21:29

## 2023-08-25 RX ADMIN — METRONIDAZOLE 500 MG: 250 TABLET ORAL at 08:34

## 2023-08-25 ASSESSMENT — PAIN DESCRIPTION - ORIENTATION
ORIENTATION: MID

## 2023-08-25 ASSESSMENT — PAIN SCALES - GENERAL
PAINLEVEL_OUTOF10: 8
PAINLEVEL_OUTOF10: 7
PAINLEVEL_OUTOF10: 8

## 2023-08-25 ASSESSMENT — PAIN DESCRIPTION - LOCATION
LOCATION: BACK

## 2023-08-25 ASSESSMENT — PAIN DESCRIPTION - DESCRIPTORS
DESCRIPTORS: ACHING
DESCRIPTORS: ACHING
DESCRIPTORS: SPASM;ACHING

## 2023-08-25 NOTE — FLOWSHEET NOTE
08/25/23 0600   Vitals   Pulse 87   Peritoneal Dialysis Catheter Mid lower abdomen   No placement date or time found. Catheter Location: Mid lower abdomen   Status Deaccessed   Site Condition Clean, dry, intact   Dressing Status Clean, dry & intact   Dressing Gauze   Date of Last Dressing Change 08/24/23   Dialysis Type Continuous cycling   Catheter Care Given Yes  (Two minute Alcavis scrub/soak prior to disconnection)   Post-Treatment (Cycler)   Average Dwell Time (Hours:Minutes) 1:24   Lost Dwell Time (Hours:Minutes) :02   Effluent Appearance Clear;Yellow   PD Output (mL) 1285 mL  (Idrain 10 ml + total UF 1275 ml = 1285 ml)     Primary RN SBAR: Kellie Voss RN  Comments: Used bags, lines and cartridge disposed of in red bag. Patient left with bed low, side rails up X 2, call bell and belongings in reach.

## 2023-08-26 LAB
ANION GAP SERPL CALC-SCNC: 7 MMOL/L (ref 5–15)
BASOPHILS # BLD: 0.1 K/UL (ref 0–0.1)
BASOPHILS NFR BLD: 1 % (ref 0–1)
BUN SERPL-MCNC: 26 MG/DL (ref 6–20)
BUN/CREAT SERPL: 4 (ref 12–20)
CALCIUM SERPL-MCNC: 8.1 MG/DL (ref 8.5–10.1)
CHLORIDE SERPL-SCNC: 102 MMOL/L (ref 97–108)
CO2 SERPL-SCNC: 30 MMOL/L (ref 21–32)
CREAT SERPL-MCNC: 5.82 MG/DL (ref 0.55–1.02)
DIFFERENTIAL METHOD BLD: ABNORMAL
EOSINOPHIL # BLD: 0.5 K/UL (ref 0–0.4)
EOSINOPHIL NFR BLD: 6 % (ref 0–7)
ERYTHROCYTE [DISTWIDTH] IN BLOOD BY AUTOMATED COUNT: 17.5 % (ref 11.5–14.5)
GLUCOSE BLD STRIP.AUTO-MCNC: 161 MG/DL (ref 65–117)
GLUCOSE BLD STRIP.AUTO-MCNC: 214 MG/DL (ref 65–117)
GLUCOSE BLD STRIP.AUTO-MCNC: 233 MG/DL (ref 65–117)
GLUCOSE BLD STRIP.AUTO-MCNC: 235 MG/DL (ref 65–117)
GLUCOSE SERPL-MCNC: 216 MG/DL (ref 65–100)
HCT VFR BLD AUTO: 25.9 % (ref 35–47)
HGB BLD-MCNC: 8 G/DL (ref 11.5–16)
IMM GRANULOCYTES # BLD AUTO: 0.2 K/UL (ref 0–0.04)
IMM GRANULOCYTES NFR BLD AUTO: 2 % (ref 0–0.5)
LYMPHOCYTES # BLD: 2 K/UL (ref 0.8–3.5)
LYMPHOCYTES NFR BLD: 23 % (ref 12–49)
MCH RBC QN AUTO: 28.8 PG (ref 26–34)
MCHC RBC AUTO-ENTMCNC: 30.9 G/DL (ref 30–36.5)
MCV RBC AUTO: 93.2 FL (ref 80–99)
MONOCYTES # BLD: 0.6 K/UL (ref 0–1)
MONOCYTES NFR BLD: 7 % (ref 5–13)
NEUTS SEG # BLD: 5.5 K/UL (ref 1.8–8)
NEUTS SEG NFR BLD: 61 % (ref 32–75)
NRBC # BLD: 0 K/UL (ref 0–0.01)
NRBC BLD-RTO: 0 PER 100 WBC
PLATELET # BLD AUTO: 263 K/UL (ref 150–400)
PMV BLD AUTO: 9.2 FL (ref 8.9–12.9)
POTASSIUM SERPL-SCNC: 4.5 MMOL/L (ref 3.5–5.1)
RBC # BLD AUTO: 2.78 M/UL (ref 3.8–5.2)
RBC MORPH BLD: ABNORMAL
SERVICE CMNT-IMP: ABNORMAL
SODIUM SERPL-SCNC: 139 MMOL/L (ref 136–145)
WBC # BLD AUTO: 8.9 K/UL (ref 3.6–11)

## 2023-08-26 PROCEDURE — 80048 BASIC METABOLIC PNL TOTAL CA: CPT

## 2023-08-26 PROCEDURE — 6370000000 HC RX 637 (ALT 250 FOR IP): Performed by: NURSE PRACTITIONER

## 2023-08-26 PROCEDURE — 2580000003 HC RX 258: Performed by: STUDENT IN AN ORGANIZED HEALTH CARE EDUCATION/TRAINING PROGRAM

## 2023-08-26 PROCEDURE — 36415 COLL VENOUS BLD VENIPUNCTURE: CPT

## 2023-08-26 PROCEDURE — 6370000000 HC RX 637 (ALT 250 FOR IP): Performed by: HOSPITALIST

## 2023-08-26 PROCEDURE — 6370000000 HC RX 637 (ALT 250 FOR IP): Performed by: STUDENT IN AN ORGANIZED HEALTH CARE EDUCATION/TRAINING PROGRAM

## 2023-08-26 PROCEDURE — 85025 COMPLETE CBC W/AUTO DIFF WBC: CPT

## 2023-08-26 PROCEDURE — 2580000003 HC RX 258: Performed by: INTERNAL MEDICINE

## 2023-08-26 PROCEDURE — 2060000000 HC ICU INTERMEDIATE R&B

## 2023-08-26 PROCEDURE — 82962 GLUCOSE BLOOD TEST: CPT

## 2023-08-26 PROCEDURE — 90945 DIALYSIS ONE EVALUATION: CPT

## 2023-08-26 RX ORDER — DIPHENOXYLATE HYDROCHLORIDE AND ATROPINE SULFATE 2.5; .025 MG/1; MG/1
1 TABLET ORAL 3 TIMES DAILY PRN
Status: DISCONTINUED | OUTPATIENT
Start: 2023-08-26 | End: 2023-09-07

## 2023-08-26 RX ADMIN — POTASSIUM CHLORIDE 40 MEQ: 750 TABLET, FILM COATED, EXTENDED RELEASE ORAL at 08:40

## 2023-08-26 RX ADMIN — OXYCODONE HYDROCHLORIDE 5 MG: 5 TABLET ORAL at 21:03

## 2023-08-26 RX ADMIN — PANTOPRAZOLE SODIUM 40 MG: 40 TABLET, DELAYED RELEASE ORAL at 16:50

## 2023-08-26 RX ADMIN — OXYCODONE HYDROCHLORIDE 5 MG: 5 TABLET ORAL at 13:32

## 2023-08-26 RX ADMIN — Medication 1 MG: at 08:41

## 2023-08-26 RX ADMIN — OXYCODONE HYDROCHLORIDE 5 MG: 5 TABLET ORAL at 07:24

## 2023-08-26 RX ADMIN — METRONIDAZOLE 500 MG: 250 TABLET ORAL at 21:03

## 2023-08-26 RX ADMIN — SODIUM CHLORIDE, SODIUM LACTATE, CALCIUM CHLORIDE, MAGNESIUM CHLORIDE AND DEXTROSE 6000 ML: 2.5; 538; 448; 18.3; 5.08 INJECTION, SOLUTION INTRAPERITONEAL at 18:32

## 2023-08-26 RX ADMIN — DULOXETINE 20 MG: 20 CAPSULE, DELAYED RELEASE ORAL at 08:41

## 2023-08-26 RX ADMIN — MONTELUKAST 10 MG: 10 TABLET, FILM COATED ORAL at 21:03

## 2023-08-26 RX ADMIN — METRONIDAZOLE 500 MG: 250 TABLET ORAL at 08:39

## 2023-08-26 RX ADMIN — Medication 10 ML: at 21:05

## 2023-08-26 RX ADMIN — ROSUVASTATIN 40 MG: 40 TABLET, FILM COATED ORAL at 08:40

## 2023-08-26 RX ADMIN — OXYCODONE HYDROCHLORIDE 5 MG: 5 TABLET ORAL at 00:50

## 2023-08-26 RX ADMIN — Medication 2 UNITS: at 12:13

## 2023-08-26 RX ADMIN — AMLODIPINE BESYLATE 10 MG: 5 TABLET ORAL at 08:40

## 2023-08-26 RX ADMIN — GENTAMICIN SULFATE: 1 CREAM TOPICAL at 18:32

## 2023-08-26 RX ADMIN — CARVEDILOL 12.5 MG: 12.5 TABLET, FILM COATED ORAL at 08:40

## 2023-08-26 RX ADMIN — Medication 2 UNITS: at 16:51

## 2023-08-26 RX ADMIN — PANTOPRAZOLE SODIUM 40 MG: 40 TABLET, DELAYED RELEASE ORAL at 07:24

## 2023-08-26 RX ADMIN — SODIUM CHLORIDE, SODIUM LACTATE, CALCIUM CHLORIDE, MAGNESIUM CHLORIDE AND DEXTROSE 6000 ML: 2.5; 538; 448; 18.3; 5.08 INJECTION, SOLUTION INTRAPERITONEAL at 18:31

## 2023-08-26 RX ADMIN — SODIUM CHLORIDE, PRESERVATIVE FREE 10 ML: 5 INJECTION INTRAVENOUS at 21:06

## 2023-08-26 RX ADMIN — CLARITHROMYCIN 250 MG: 500 TABLET, FILM COATED ORAL at 21:03

## 2023-08-26 RX ADMIN — Medication 10 ML: at 08:41

## 2023-08-26 RX ADMIN — DULOXETINE 20 MG: 20 CAPSULE, DELAYED RELEASE ORAL at 21:04

## 2023-08-26 RX ADMIN — CARVEDILOL 12.5 MG: 12.5 TABLET, FILM COATED ORAL at 16:50

## 2023-08-26 RX ADMIN — SODIUM CHLORIDE, PRESERVATIVE FREE 10 ML: 5 INJECTION INTRAVENOUS at 08:41

## 2023-08-26 RX ADMIN — CLARITHROMYCIN 250 MG: 500 TABLET, FILM COATED ORAL at 08:40

## 2023-08-26 RX ADMIN — POTASSIUM CHLORIDE 40 MEQ: 750 TABLET, FILM COATED, EXTENDED RELEASE ORAL at 21:03

## 2023-08-26 ASSESSMENT — PAIN SCALES - GENERAL
PAINLEVEL_OUTOF10: 8
PAINLEVEL_OUTOF10: 7
PAINLEVEL_OUTOF10: 4
PAINLEVEL_OUTOF10: 7
PAINLEVEL_OUTOF10: 9

## 2023-08-26 ASSESSMENT — PAIN DESCRIPTION - DESCRIPTORS
DESCRIPTORS: SORE
DESCRIPTORS: ACHING;DISCOMFORT
DESCRIPTORS: SORE
DESCRIPTORS: ACHING

## 2023-08-26 ASSESSMENT — PAIN DESCRIPTION - LOCATION
LOCATION: BACK
LOCATION: BACK
LOCATION: BACK;GENERALIZED
LOCATION: GENERALIZED;BACK

## 2023-08-26 NOTE — FLOWSHEET NOTE
CCPD Connection: 08/26/23 1840   Vitals   /75   Pulse 90   Respirations 13   Peritoneal Dialysis Catheter Mid lower abdomen   No placement date or time found.    Catheter Location: Mid lower abdomen   Status Accessed   Site Condition Clean, dry, intact   Dressing Status New dressing applied   Dressing Gauze   Date of Last Dressing Change 08/26/23   Dialysis Type Continuous cycling   Exit Site Condition Excellent   Catheter Care Given Yes   Cycler   Verification of Prescription CCPD   Informed Consent  Yes  (Chronic consent applies)   Total Volume Programmed 09911 mL   Therapy Time (Hours:Minutes) 9:30   Cycler Type Jung HomeChoice   Fill Volume 2300 mL   Last Fill Volume 0 mL   Dextrose Setting Same (Nonextraneal)   I Drain Alarm 0 mL   Number of Cycles 5   Bag Volume 6000 mL   Number of Bags Used 2   Dianeal Solution   (2.5% Dextrose in 6000 ml)     Time Out (time): 2739  Primary RN SBAR: Anirudh Dooley RN  Patient Education: Infection prevention    Hepatitis B Surface Ag   Date/Time Value Ref Range Status   08/25/2023 12:35 AM <0.10 Index Final     Hep B S Ab   Date/Time Value Ref Range Status   08/20/2023 10:01 AM <3.10 mIU/mL Final

## 2023-08-26 NOTE — FLOWSHEET NOTE
08/26/23 0244   Peritoneal Dialysis Catheter Mid lower abdomen   No placement date or time found. Catheter Location: Mid lower abdomen   Status Deaccessed   Site Condition Clean, dry, intact   Dressing Status Clean, dry & intact   Dressing Gauze   Date of Last Dressing Change 08/25/23   Dialysis Type Continuous cycling   Catheter Care Given Yes  (Two minute Alcavis scrub/soak performed prior to disconection.)   Post-Treatment (Cycler)   Average Dwell Time (Hours:Minutes) 1:22   Lost Dwell Time (Hours:Minutes) :12   Effluent Appearance Clear;Yellow   PD Output (mL) 1536 mL  (Idrain 86ml + total UF 1450 ml = 1536 ml)     Primary RN SBAR: Jessica Hendricks RN  Comments: Used lines, bags and cartridge discarded in red bag. Pt left with bed low, siderails up X 2, call bell and belongings in reach.

## 2023-08-27 LAB
ANION GAP SERPL CALC-SCNC: 7 MMOL/L (ref 5–15)
BASOPHILS # BLD: 0.1 K/UL (ref 0–0.1)
BASOPHILS NFR BLD: 1 % (ref 0–1)
BUN SERPL-MCNC: 25 MG/DL (ref 6–20)
BUN/CREAT SERPL: 4 (ref 12–20)
CALCIUM SERPL-MCNC: 8 MG/DL (ref 8.5–10.1)
CHLORIDE SERPL-SCNC: 104 MMOL/L (ref 97–108)
CO2 SERPL-SCNC: 26 MMOL/L (ref 21–32)
CREAT SERPL-MCNC: 6.03 MG/DL (ref 0.55–1.02)
DIFFERENTIAL METHOD BLD: ABNORMAL
EOSINOPHIL # BLD: 0.3 K/UL (ref 0–0.4)
EOSINOPHIL NFR BLD: 4 % (ref 0–7)
ERYTHROCYTE [DISTWIDTH] IN BLOOD BY AUTOMATED COUNT: 17.5 % (ref 11.5–14.5)
GLUCOSE BLD STRIP.AUTO-MCNC: 151 MG/DL (ref 65–117)
GLUCOSE BLD STRIP.AUTO-MCNC: 215 MG/DL (ref 65–117)
GLUCOSE BLD STRIP.AUTO-MCNC: 218 MG/DL (ref 65–117)
GLUCOSE BLD STRIP.AUTO-MCNC: 253 MG/DL (ref 65–117)
GLUCOSE SERPL-MCNC: 305 MG/DL (ref 65–100)
HCT VFR BLD AUTO: 25.9 % (ref 35–47)
HGB BLD-MCNC: 7.9 G/DL (ref 11.5–16)
IMM GRANULOCYTES # BLD AUTO: 0.1 K/UL (ref 0–0.04)
IMM GRANULOCYTES NFR BLD AUTO: 2 % (ref 0–0.5)
LYMPHOCYTES # BLD: 1.8 K/UL (ref 0.8–3.5)
LYMPHOCYTES NFR BLD: 23 % (ref 12–49)
MCH RBC QN AUTO: 28.8 PG (ref 26–34)
MCHC RBC AUTO-ENTMCNC: 30.5 G/DL (ref 30–36.5)
MCV RBC AUTO: 94.5 FL (ref 80–99)
MONOCYTES # BLD: 0.6 K/UL (ref 0–1)
MONOCYTES NFR BLD: 8 % (ref 5–13)
NEUTS SEG # BLD: 5.1 K/UL (ref 1.8–8)
NEUTS SEG NFR BLD: 62 % (ref 32–75)
NRBC # BLD: 0.02 K/UL (ref 0–0.01)
NRBC BLD-RTO: 0.3 PER 100 WBC
PLATELET # BLD AUTO: 252 K/UL (ref 150–400)
PMV BLD AUTO: 9.6 FL (ref 8.9–12.9)
POTASSIUM SERPL-SCNC: 5.1 MMOL/L (ref 3.5–5.1)
RBC # BLD AUTO: 2.74 M/UL (ref 3.8–5.2)
SERVICE CMNT-IMP: ABNORMAL
SODIUM SERPL-SCNC: 137 MMOL/L (ref 136–145)
WBC # BLD AUTO: 8 K/UL (ref 3.6–11)

## 2023-08-27 PROCEDURE — 6370000000 HC RX 637 (ALT 250 FOR IP): Performed by: NURSE PRACTITIONER

## 2023-08-27 PROCEDURE — 82962 GLUCOSE BLOOD TEST: CPT

## 2023-08-27 PROCEDURE — 90945 DIALYSIS ONE EVALUATION: CPT

## 2023-08-27 PROCEDURE — 2580000003 HC RX 258: Performed by: STUDENT IN AN ORGANIZED HEALTH CARE EDUCATION/TRAINING PROGRAM

## 2023-08-27 PROCEDURE — 36415 COLL VENOUS BLD VENIPUNCTURE: CPT

## 2023-08-27 PROCEDURE — 2060000000 HC ICU INTERMEDIATE R&B

## 2023-08-27 PROCEDURE — 6370000000 HC RX 637 (ALT 250 FOR IP): Performed by: HOSPITALIST

## 2023-08-27 PROCEDURE — 94760 N-INVAS EAR/PLS OXIMETRY 1: CPT

## 2023-08-27 PROCEDURE — 6370000000 HC RX 637 (ALT 250 FOR IP): Performed by: INTERNAL MEDICINE

## 2023-08-27 PROCEDURE — 6370000000 HC RX 637 (ALT 250 FOR IP): Performed by: STUDENT IN AN ORGANIZED HEALTH CARE EDUCATION/TRAINING PROGRAM

## 2023-08-27 PROCEDURE — 2580000003 HC RX 258: Performed by: INTERNAL MEDICINE

## 2023-08-27 PROCEDURE — 85025 COMPLETE CBC W/AUTO DIFF WBC: CPT

## 2023-08-27 PROCEDURE — 80048 BASIC METABOLIC PNL TOTAL CA: CPT

## 2023-08-27 RX ADMIN — SODIUM CHLORIDE, SODIUM LACTATE, CALCIUM CHLORIDE, MAGNESIUM CHLORIDE AND DEXTROSE 6000 ML: 2.5; 538; 448; 18.3; 5.08 INJECTION, SOLUTION INTRAPERITONEAL at 15:47

## 2023-08-27 RX ADMIN — Medication 10 ML: at 20:52

## 2023-08-27 RX ADMIN — DULOXETINE 20 MG: 20 CAPSULE, DELAYED RELEASE ORAL at 20:49

## 2023-08-27 RX ADMIN — CLARITHROMYCIN 250 MG: 500 TABLET, FILM COATED ORAL at 10:54

## 2023-08-27 RX ADMIN — OXYCODONE HYDROCHLORIDE 5 MG: 5 TABLET ORAL at 17:15

## 2023-08-27 RX ADMIN — DULOXETINE 20 MG: 20 CAPSULE, DELAYED RELEASE ORAL at 08:47

## 2023-08-27 RX ADMIN — SODIUM CHLORIDE, PRESERVATIVE FREE 10 ML: 5 INJECTION INTRAVENOUS at 20:52

## 2023-08-27 RX ADMIN — SODIUM CHLORIDE, PRESERVATIVE FREE 10 ML: 5 INJECTION INTRAVENOUS at 08:50

## 2023-08-27 RX ADMIN — PANTOPRAZOLE SODIUM 40 MG: 40 TABLET, DELAYED RELEASE ORAL at 07:19

## 2023-08-27 RX ADMIN — ROSUVASTATIN 40 MG: 40 TABLET, FILM COATED ORAL at 08:47

## 2023-08-27 RX ADMIN — POTASSIUM CHLORIDE 40 MEQ: 750 TABLET, FILM COATED, EXTENDED RELEASE ORAL at 20:49

## 2023-08-27 RX ADMIN — Medication 10 ML: at 08:48

## 2023-08-27 RX ADMIN — METRONIDAZOLE 500 MG: 250 TABLET ORAL at 20:49

## 2023-08-27 RX ADMIN — Medication 1 MG: at 08:47

## 2023-08-27 RX ADMIN — CARVEDILOL 12.5 MG: 12.5 TABLET, FILM COATED ORAL at 08:47

## 2023-08-27 RX ADMIN — BUMETANIDE 2 MG: 1 TABLET ORAL at 08:47

## 2023-08-27 RX ADMIN — OXYCODONE HYDROCHLORIDE 5 MG: 5 TABLET ORAL at 03:34

## 2023-08-27 RX ADMIN — CARVEDILOL 12.5 MG: 12.5 TABLET, FILM COATED ORAL at 15:59

## 2023-08-27 RX ADMIN — Medication 4 UNITS: at 08:47

## 2023-08-27 RX ADMIN — Medication 2 UNITS: at 17:14

## 2023-08-27 RX ADMIN — AMLODIPINE BESYLATE 10 MG: 5 TABLET ORAL at 08:47

## 2023-08-27 RX ADMIN — OXYCODONE HYDROCHLORIDE 5 MG: 5 TABLET ORAL at 10:56

## 2023-08-27 RX ADMIN — MONTELUKAST 10 MG: 10 TABLET, FILM COATED ORAL at 20:49

## 2023-08-27 RX ADMIN — METRONIDAZOLE 500 MG: 250 TABLET ORAL at 08:47

## 2023-08-27 RX ADMIN — CLARITHROMYCIN 250 MG: 500 TABLET, FILM COATED ORAL at 20:49

## 2023-08-27 RX ADMIN — GENTAMICIN SULFATE: 1 CREAM TOPICAL at 15:47

## 2023-08-27 RX ADMIN — POTASSIUM CHLORIDE 40 MEQ: 750 TABLET, FILM COATED, EXTENDED RELEASE ORAL at 08:47

## 2023-08-27 RX ADMIN — OXYCODONE HYDROCHLORIDE 5 MG: 5 TABLET ORAL at 23:26

## 2023-08-27 RX ADMIN — PANTOPRAZOLE SODIUM 40 MG: 40 TABLET, DELAYED RELEASE ORAL at 15:59

## 2023-08-27 ASSESSMENT — PAIN DESCRIPTION - LOCATION
LOCATION: GENERALIZED;BACK
LOCATION: BACK
LOCATION: BACK
LOCATION: GENERALIZED;BACK
LOCATION: BACK

## 2023-08-27 ASSESSMENT — PAIN SCALES - GENERAL
PAINLEVEL_OUTOF10: 0
PAINLEVEL_OUTOF10: 9
PAINLEVEL_OUTOF10: 7
PAINLEVEL_OUTOF10: 0
PAINLEVEL_OUTOF10: 8
PAINLEVEL_OUTOF10: 7
PAINLEVEL_OUTOF10: 0
PAINLEVEL_OUTOF10: 8

## 2023-08-27 ASSESSMENT — PAIN DESCRIPTION - DESCRIPTORS: DESCRIPTORS: SORE

## 2023-08-27 NOTE — FLOWSHEET NOTE
08/27/23 0706   Peritoneal Dialysis Catheter Mid lower abdomen   No placement date or time found. Catheter Location: Mid lower abdomen   Status Deaccessed   Site Condition Clean, dry, intact   Dressing Status Clean, dry & intact   Dressing Gauze   Date of Last Dressing Change 08/26/23   Dialysis Type Continuous cycling   Catheter Care Given Yes  (Two minute Alcavis scrub/soak performed prior to disconnection)   Post-Treatment (Cycler)   Average Dwell Time (Hours:Minutes) 1:22   Lost Dwell Time (Hours:Minutes) :13   Effluent Appearance Clear;Yellow   PD Output (mL) 1275 mL  (I drain 11 ml + total UF 1264 = net UF 1275 ml)     Primary RN SBAR: Josy Lind RN  Comments: Used cassette, bags and lines discarded in red bin. PD catheter secured to the patient's abdomen with tape. Pt  left with siderails up X 2, bed low, call bell and belongings in reach.

## 2023-08-28 ENCOUNTER — APPOINTMENT (OUTPATIENT)
Facility: HOSPITAL | Age: 68
DRG: 853 | End: 2023-08-28
Payer: MEDICARE

## 2023-08-28 LAB
ANION GAP SERPL CALC-SCNC: 5 MMOL/L (ref 5–15)
ARTERIAL PATENCY WRIST A: POSITIVE
BACTERIA SPEC CULT: NORMAL
BACTERIA SPEC CULT: NORMAL
BASE EXCESS BLD CALC-SCNC: 3.7 MMOL/L
BDY SITE: ABNORMAL
BUN SERPL-MCNC: 21 MG/DL (ref 6–20)
BUN/CREAT SERPL: 3 (ref 12–20)
CALCIUM SERPL-MCNC: 8.1 MG/DL (ref 8.5–10.1)
CHLORIDE SERPL-SCNC: 107 MMOL/L (ref 97–108)
CO2 SERPL-SCNC: 28 MMOL/L (ref 21–32)
COMMENT:: NORMAL
CREAT SERPL-MCNC: 6.07 MG/DL (ref 0.55–1.02)
ECHO BSA: 2.02 M2
ECHO BSA: 2.02 M2
EST. AVERAGE GLUCOSE BLD GHB EST-MCNC: 128 MG/DL
GAS FLOW.O2 O2 DELIVERY SYS: ABNORMAL
GLUCOSE BLD STRIP.AUTO-MCNC: 162 MG/DL (ref 65–117)
GLUCOSE BLD STRIP.AUTO-MCNC: 184 MG/DL (ref 65–117)
GLUCOSE BLD STRIP.AUTO-MCNC: 212 MG/DL (ref 65–117)
GLUCOSE BLD STRIP.AUTO-MCNC: 277 MG/DL (ref 65–117)
GLUCOSE SERPL-MCNC: 160 MG/DL (ref 65–100)
HBA1C MFR BLD: 6.1 % (ref 4–5.6)
HCO3 BLD-SCNC: 28.1 MMOL/L (ref 22–26)
PCO2 BLD: 40.9 MMHG (ref 35–45)
PH BLD: 7.44 (ref 7.35–7.45)
PO2 BLD: 71 MMHG (ref 80–100)
POTASSIUM SERPL-SCNC: 5.9 MMOL/L (ref 3.5–5.1)
SAO2 % BLD: 94.6 % (ref 92–97)
SERVICE CMNT-IMP: ABNORMAL
SERVICE CMNT-IMP: NORMAL
SERVICE CMNT-IMP: NORMAL
SODIUM SERPL-SCNC: 140 MMOL/L (ref 136–145)
SPECIMEN HOLD: NORMAL
SPECIMEN TYPE: ABNORMAL
TSH SERPL DL<=0.05 MIU/L-ACNC: 4.87 UIU/ML (ref 0.36–3.74)
VAS LEFT ABI: 0.68
VAS LEFT ARM BP: 154 MMHG
VAS LEFT CCA DIST EDV: 12.5 CM/S
VAS LEFT CCA DIST PSV: 58.1 CM/S
VAS LEFT CCA PROX EDV: 11.1 CM/S
VAS LEFT CCA PROX PSV: 61.6 CM/S
VAS LEFT DORSALIS PEDIS BP: 87 MMHG
VAS LEFT ECA EDV: 6.1 CM/S
VAS LEFT ECA PSV: 82.3 CM/S
VAS LEFT ICA DIST EDV: 10.7 CM/S
VAS LEFT ICA DIST PSV: 71.1 CM/S
VAS LEFT ICA MID EDV: 19.2 CM/S
VAS LEFT ICA MID PSV: 65.9 CM/S
VAS LEFT ICA PROX EDV: 7.6 CM/S
VAS LEFT ICA PROX PSV: 52 CM/S
VAS LEFT ICA/CCA PSV: 1.2 NO UNITS
VAS LEFT PTA BP: 104 MMHG
VAS LEFT VERTEBRAL EDV: 15.1 CM/S
VAS LEFT VERTEBRAL PSV: 55.2 CM/S
VAS RIGHT ABI: 0.82
VAS RIGHT ARM BP: 147 MMHG
VAS RIGHT CCA DIST EDV: 11.6 CM/S
VAS RIGHT CCA DIST PSV: 61.1 CM/S
VAS RIGHT CCA PROX EDV: 12.7 CM/S
VAS RIGHT CCA PROX PSV: 63.3 CM/S
VAS RIGHT DORSALIS PEDIS BP: 127 MMHG
VAS RIGHT ECA EDV: 5.4 CM/S
VAS RIGHT ECA PSV: 55 CM/S
VAS RIGHT ICA DIST EDV: 10.6 CM/S
VAS RIGHT ICA DIST PSV: 49.9 CM/S
VAS RIGHT ICA MID EDV: 18 CM/S
VAS RIGHT ICA MID PSV: 61.5 CM/S
VAS RIGHT ICA PROX EDV: 8.3 CM/S
VAS RIGHT ICA PROX PSV: 43.1 CM/S
VAS RIGHT ICA/CCA PSV: 1 NO UNITS
VAS RIGHT PTA BP: 123 MMHG
VAS RIGHT VERTEBRAL EDV: 10.4 CM/S
VAS RIGHT VERTEBRAL PSV: 41.7 CM/S

## 2023-08-28 PROCEDURE — 2060000000 HC ICU INTERMEDIATE R&B

## 2023-08-28 PROCEDURE — 6370000000 HC RX 637 (ALT 250 FOR IP): Performed by: INTERNAL MEDICINE

## 2023-08-28 PROCEDURE — 80048 BASIC METABOLIC PNL TOTAL CA: CPT

## 2023-08-28 PROCEDURE — 6370000000 HC RX 637 (ALT 250 FOR IP): Performed by: HOSPITALIST

## 2023-08-28 PROCEDURE — 6370000000 HC RX 637 (ALT 250 FOR IP): Performed by: NURSE PRACTITIONER

## 2023-08-28 PROCEDURE — 84443 ASSAY THYROID STIM HORMONE: CPT

## 2023-08-28 PROCEDURE — 83036 HEMOGLOBIN GLYCOSYLATED A1C: CPT

## 2023-08-28 PROCEDURE — 6360000002 HC RX W HCPCS: Performed by: INTERNAL MEDICINE

## 2023-08-28 PROCEDURE — 36600 WITHDRAWAL OF ARTERIAL BLOOD: CPT

## 2023-08-28 PROCEDURE — 93922 UPR/L XTREMITY ART 2 LEVELS: CPT

## 2023-08-28 PROCEDURE — 2580000003 HC RX 258: Performed by: STUDENT IN AN ORGANIZED HEALTH CARE EDUCATION/TRAINING PROGRAM

## 2023-08-28 PROCEDURE — 2580000003 HC RX 258: Performed by: INTERNAL MEDICINE

## 2023-08-28 PROCEDURE — 36415 COLL VENOUS BLD VENIPUNCTURE: CPT

## 2023-08-28 PROCEDURE — 90945 DIALYSIS ONE EVALUATION: CPT

## 2023-08-28 PROCEDURE — 6370000000 HC RX 637 (ALT 250 FOR IP): Performed by: STUDENT IN AN ORGANIZED HEALTH CARE EDUCATION/TRAINING PROGRAM

## 2023-08-28 PROCEDURE — 82962 GLUCOSE BLOOD TEST: CPT

## 2023-08-28 PROCEDURE — 82803 BLOOD GASES ANY COMBINATION: CPT

## 2023-08-28 PROCEDURE — 93880 EXTRACRANIAL BILAT STUDY: CPT

## 2023-08-28 RX ADMIN — CLARITHROMYCIN 250 MG: 500 TABLET, FILM COATED ORAL at 08:25

## 2023-08-28 RX ADMIN — SODIUM CHLORIDE, SODIUM LACTATE, CALCIUM CHLORIDE, MAGNESIUM CHLORIDE AND DEXTROSE 6000 ML: 2.5; 538; 448; 18.3; 5.08 INJECTION, SOLUTION INTRAPERITONEAL at 14:31

## 2023-08-28 RX ADMIN — SODIUM CHLORIDE, PRESERVATIVE FREE 10 ML: 5 INJECTION INTRAVENOUS at 08:28

## 2023-08-28 RX ADMIN — CLARITHROMYCIN 250 MG: 500 TABLET, FILM COATED ORAL at 21:14

## 2023-08-28 RX ADMIN — POTASSIUM CHLORIDE 40 MEQ: 750 TABLET, FILM COATED, EXTENDED RELEASE ORAL at 08:24

## 2023-08-28 RX ADMIN — MONTELUKAST 10 MG: 10 TABLET, FILM COATED ORAL at 21:07

## 2023-08-28 RX ADMIN — CARVEDILOL 12.5 MG: 12.5 TABLET, FILM COATED ORAL at 16:58

## 2023-08-28 RX ADMIN — Medication 10 ML: at 08:27

## 2023-08-28 RX ADMIN — SODIUM CHLORIDE, PRESERVATIVE FREE 10 ML: 5 INJECTION INTRAVENOUS at 21:06

## 2023-08-28 RX ADMIN — Medication 1 MG: at 08:24

## 2023-08-28 RX ADMIN — OXYCODONE HYDROCHLORIDE 5 MG: 5 TABLET ORAL at 20:06

## 2023-08-28 RX ADMIN — BUMETANIDE 2 MG: 1 TABLET ORAL at 08:24

## 2023-08-28 RX ADMIN — ROSUVASTATIN 40 MG: 40 TABLET, FILM COATED ORAL at 08:24

## 2023-08-28 RX ADMIN — METRONIDAZOLE 500 MG: 250 TABLET ORAL at 21:07

## 2023-08-28 RX ADMIN — DULOXETINE 20 MG: 20 CAPSULE, DELAYED RELEASE ORAL at 21:07

## 2023-08-28 RX ADMIN — SODIUM ZIRCONIUM CYCLOSILICATE 10 G: 10 POWDER, FOR SUSPENSION ORAL at 12:55

## 2023-08-28 RX ADMIN — OXYCODONE HYDROCHLORIDE 5 MG: 5 TABLET ORAL at 11:53

## 2023-08-28 RX ADMIN — Medication 10 ML: at 21:07

## 2023-08-28 RX ADMIN — PANTOPRAZOLE SODIUM 40 MG: 40 TABLET, DELAYED RELEASE ORAL at 16:58

## 2023-08-28 RX ADMIN — DULOXETINE 20 MG: 20 CAPSULE, DELAYED RELEASE ORAL at 08:24

## 2023-08-28 RX ADMIN — Medication 4 UNITS: at 18:13

## 2023-08-28 RX ADMIN — METRONIDAZOLE 500 MG: 250 TABLET ORAL at 08:25

## 2023-08-28 RX ADMIN — CARVEDILOL 12.5 MG: 12.5 TABLET, FILM COATED ORAL at 08:25

## 2023-08-28 RX ADMIN — AMLODIPINE BESYLATE 10 MG: 5 TABLET ORAL at 08:24

## 2023-08-28 RX ADMIN — OXYCODONE HYDROCHLORIDE 5 MG: 5 TABLET ORAL at 05:42

## 2023-08-28 RX ADMIN — ERYTHROPOIETIN 20000 UNITS: 20000 INJECTION, SOLUTION INTRAVENOUS; SUBCUTANEOUS at 10:05

## 2023-08-28 RX ADMIN — GENTAMICIN SULFATE: 1 CREAM TOPICAL at 14:31

## 2023-08-28 RX ADMIN — PANTOPRAZOLE SODIUM 40 MG: 40 TABLET, DELAYED RELEASE ORAL at 05:42

## 2023-08-28 ASSESSMENT — PAIN SCALES - GENERAL
PAINLEVEL_OUTOF10: 0
PAINLEVEL_OUTOF10: 7
PAINLEVEL_OUTOF10: 6
PAINLEVEL_OUTOF10: 0
PAINLEVEL_OUTOF10: 7
PAINLEVEL_OUTOF10: 7
PAINLEVEL_OUTOF10: 2
PAINLEVEL_OUTOF10: 7
PAINLEVEL_OUTOF10: 7
PAINLEVEL_OUTOF10: 6

## 2023-08-28 ASSESSMENT — PAIN DESCRIPTION - DESCRIPTORS
DESCRIPTORS: SORE
DESCRIPTORS: ACHING

## 2023-08-28 ASSESSMENT — PAIN DESCRIPTION - FREQUENCY: FREQUENCY: INTERMITTENT

## 2023-08-28 ASSESSMENT — PAIN DESCRIPTION - LOCATION
LOCATION: BACK
LOCATION: BACK
LOCATION: BACK;GENERALIZED
LOCATION: BACK
LOCATION: BACK

## 2023-08-28 ASSESSMENT — PAIN - FUNCTIONAL ASSESSMENT: PAIN_FUNCTIONAL_ASSESSMENT: ACTIVITIES ARE NOT PREVENTED

## 2023-08-28 ASSESSMENT — PAIN DESCRIPTION - PAIN TYPE: TYPE: CHRONIC PAIN

## 2023-08-28 ASSESSMENT — PAIN DESCRIPTION - ORIENTATION: ORIENTATION: LOWER

## 2023-08-28 ASSESSMENT — PAIN DESCRIPTION - ONSET: ONSET: GRADUAL

## 2023-08-28 NOTE — FLOWSHEET NOTE
Darioford Reji disconnect note        08/28/23 0215   Peritoneal Dialysis Catheter Mid lower abdomen   No placement date or time found. Catheter Location: Mid lower abdomen   Status Deaccessed   Dressing Status Clean, dry & intact   Dressing Gauze   Date of Last Dressing Change 08/27/23   Catheter Care Given Yes   Post-Treatment (Cycler)   Average Dwell Time (Hours:Minutes) 1:17   Lost Dwell Time (Hours:Minutes) 0:36   Effluent Appearance Clear;Yellow   I Drain (mL) 42 mL   PD Output (mL) 1269 mL  (I drain 42 + total UF 1227 = 1269 ml)     Primary RN SBAR: Josy Lind, JANES  Comments: no complications, Prior to disconnect 2 minutes Alcavis scrub/soak done. All old casette and tubing discarded in red biohazard bag. Bed left in lowest position, call bell within the reach.

## 2023-08-29 ENCOUNTER — APPOINTMENT (OUTPATIENT)
Facility: HOSPITAL | Age: 68
DRG: 853 | End: 2023-08-29
Attending: INTERNAL MEDICINE
Payer: MEDICARE

## 2023-08-29 LAB
ALBUMIN SERPL-MCNC: 2.1 G/DL (ref 3.5–5)
ANION GAP SERPL CALC-SCNC: 3 MMOL/L (ref 5–15)
BUN SERPL-MCNC: 23 MG/DL (ref 6–20)
BUN/CREAT SERPL: 4 (ref 12–20)
CALCIUM SERPL-MCNC: 8.7 MG/DL (ref 8.5–10.1)
CHLORIDE SERPL-SCNC: 105 MMOL/L (ref 97–108)
CO2 SERPL-SCNC: 29 MMOL/L (ref 21–32)
COMMENT:: NORMAL
CREAT SERPL-MCNC: 6.03 MG/DL (ref 0.55–1.02)
GLUCOSE BLD STRIP.AUTO-MCNC: 172 MG/DL (ref 65–117)
GLUCOSE BLD STRIP.AUTO-MCNC: 176 MG/DL (ref 65–117)
GLUCOSE BLD STRIP.AUTO-MCNC: 201 MG/DL (ref 65–117)
GLUCOSE BLD STRIP.AUTO-MCNC: 207 MG/DL (ref 65–117)
GLUCOSE SERPL-MCNC: 184 MG/DL (ref 65–100)
PHOSPHATE SERPL-MCNC: 3.2 MG/DL (ref 2.6–4.7)
POTASSIUM SERPL-SCNC: 5.3 MMOL/L (ref 3.5–5.1)
SERVICE CMNT-IMP: ABNORMAL
SODIUM SERPL-SCNC: 137 MMOL/L (ref 136–145)
SPECIMEN HOLD: NORMAL
VANCOMYCIN SERPL-MCNC: 22.3 UG/ML

## 2023-08-29 PROCEDURE — 6370000000 HC RX 637 (ALT 250 FOR IP): Performed by: INTERNAL MEDICINE

## 2023-08-29 PROCEDURE — 6370000000 HC RX 637 (ALT 250 FOR IP): Performed by: NURSE PRACTITIONER

## 2023-08-29 PROCEDURE — 6370000000 HC RX 637 (ALT 250 FOR IP): Performed by: HOSPITALIST

## 2023-08-29 PROCEDURE — 97116 GAIT TRAINING THERAPY: CPT

## 2023-08-29 PROCEDURE — 82962 GLUCOSE BLOOD TEST: CPT

## 2023-08-29 PROCEDURE — 97530 THERAPEUTIC ACTIVITIES: CPT

## 2023-08-29 PROCEDURE — 2580000003 HC RX 258: Performed by: STUDENT IN AN ORGANIZED HEALTH CARE EDUCATION/TRAINING PROGRAM

## 2023-08-29 PROCEDURE — 6370000000 HC RX 637 (ALT 250 FOR IP): Performed by: STUDENT IN AN ORGANIZED HEALTH CARE EDUCATION/TRAINING PROGRAM

## 2023-08-29 PROCEDURE — 2580000003 HC RX 258: Performed by: INTERNAL MEDICINE

## 2023-08-29 PROCEDURE — 2060000000 HC ICU INTERMEDIATE R&B

## 2023-08-29 PROCEDURE — 80202 ASSAY OF VANCOMYCIN: CPT

## 2023-08-29 PROCEDURE — 80069 RENAL FUNCTION PANEL: CPT

## 2023-08-29 PROCEDURE — 36415 COLL VENOUS BLD VENIPUNCTURE: CPT

## 2023-08-29 RX ADMIN — Medication 10 ML: at 22:32

## 2023-08-29 RX ADMIN — ROSUVASTATIN 40 MG: 40 TABLET, FILM COATED ORAL at 08:40

## 2023-08-29 RX ADMIN — MONTELUKAST 10 MG: 10 TABLET, FILM COATED ORAL at 22:31

## 2023-08-29 RX ADMIN — OXYCODONE HYDROCHLORIDE 5 MG: 5 TABLET ORAL at 08:51

## 2023-08-29 RX ADMIN — SODIUM CHLORIDE, PRESERVATIVE FREE 10 ML: 5 INJECTION INTRAVENOUS at 08:40

## 2023-08-29 RX ADMIN — AMLODIPINE BESYLATE 10 MG: 5 TABLET ORAL at 08:39

## 2023-08-29 RX ADMIN — CARVEDILOL 12.5 MG: 12.5 TABLET, FILM COATED ORAL at 16:35

## 2023-08-29 RX ADMIN — OXYCODONE HYDROCHLORIDE 5 MG: 5 TABLET ORAL at 16:36

## 2023-08-29 RX ADMIN — METRONIDAZOLE 500 MG: 250 TABLET ORAL at 08:39

## 2023-08-29 RX ADMIN — SODIUM CHLORIDE, SODIUM LACTATE, CALCIUM CHLORIDE, MAGNESIUM CHLORIDE AND DEXTROSE 6000 ML: 2.5; 538; 448; 18.3; 5.08 INJECTION, SOLUTION INTRAPERITONEAL at 22:44

## 2023-08-29 RX ADMIN — Medication 10 ML: at 08:40

## 2023-08-29 RX ADMIN — METRONIDAZOLE 500 MG: 250 TABLET ORAL at 22:31

## 2023-08-29 RX ADMIN — DULOXETINE 20 MG: 20 CAPSULE, DELAYED RELEASE ORAL at 08:39

## 2023-08-29 RX ADMIN — PANTOPRAZOLE SODIUM 40 MG: 40 TABLET, DELAYED RELEASE ORAL at 07:16

## 2023-08-29 RX ADMIN — PANTOPRAZOLE SODIUM 40 MG: 40 TABLET, DELAYED RELEASE ORAL at 16:35

## 2023-08-29 RX ADMIN — CLARITHROMYCIN 250 MG: 500 TABLET, FILM COATED ORAL at 22:40

## 2023-08-29 RX ADMIN — BUMETANIDE 2 MG: 1 TABLET ORAL at 08:39

## 2023-08-29 RX ADMIN — GENTAMICIN SULFATE: 1 CREAM TOPICAL at 22:44

## 2023-08-29 RX ADMIN — OXYCODONE HYDROCHLORIDE 5 MG: 5 TABLET ORAL at 02:35

## 2023-08-29 RX ADMIN — CLARITHROMYCIN 250 MG: 500 TABLET, FILM COATED ORAL at 08:51

## 2023-08-29 RX ADMIN — SODIUM CHLORIDE, SODIUM LACTATE, CALCIUM CHLORIDE, MAGNESIUM CHLORIDE AND DEXTROSE 6000 ML: 2.5; 538; 448; 18.3; 5.08 INJECTION, SOLUTION INTRAPERITONEAL at 22:43

## 2023-08-29 RX ADMIN — CARVEDILOL 12.5 MG: 12.5 TABLET, FILM COATED ORAL at 08:39

## 2023-08-29 RX ADMIN — DULOXETINE 20 MG: 20 CAPSULE, DELAYED RELEASE ORAL at 22:31

## 2023-08-29 RX ADMIN — Medication 1 MG: at 08:39

## 2023-08-29 RX ADMIN — OXYCODONE HYDROCHLORIDE 5 MG: 5 TABLET ORAL at 22:31

## 2023-08-29 RX ADMIN — Medication 2 UNITS: at 12:30

## 2023-08-29 ASSESSMENT — PAIN SCALES - GENERAL
PAINLEVEL_OUTOF10: 3
PAINLEVEL_OUTOF10: 8
PAINLEVEL_OUTOF10: 0
PAINLEVEL_OUTOF10: 8
PAINLEVEL_OUTOF10: 7
PAINLEVEL_OUTOF10: 8
PAINLEVEL_OUTOF10: 0
PAINLEVEL_OUTOF10: 8

## 2023-08-29 ASSESSMENT — PAIN - FUNCTIONAL ASSESSMENT
PAIN_FUNCTIONAL_ASSESSMENT: ACTIVITIES ARE NOT PREVENTED
PAIN_FUNCTIONAL_ASSESSMENT: ACTIVITIES ARE NOT PREVENTED

## 2023-08-29 ASSESSMENT — PAIN DESCRIPTION - ORIENTATION
ORIENTATION: MID
ORIENTATION: LOWER

## 2023-08-29 ASSESSMENT — PAIN DESCRIPTION - PAIN TYPE
TYPE: CHRONIC PAIN
TYPE: CHRONIC PAIN

## 2023-08-29 ASSESSMENT — PAIN DESCRIPTION - DESCRIPTORS
DESCRIPTORS: ACHING

## 2023-08-29 ASSESSMENT — PAIN DESCRIPTION - LOCATION
LOCATION: BACK
LOCATION: ABDOMEN;BACK
LOCATION: BACK

## 2023-08-29 ASSESSMENT — PAIN DESCRIPTION - FREQUENCY: FREQUENCY: CONTINUOUS

## 2023-08-29 ASSESSMENT — PAIN SCALES - WONG BAKER: WONGBAKER_NUMERICALRESPONSE: 0

## 2023-08-29 ASSESSMENT — PAIN DESCRIPTION - ONSET: ONSET: GRADUAL

## 2023-08-29 NOTE — FLOWSHEET NOTE
CCPD disconnect        08/29/23 0040   Peritoneal Dialysis Catheter Mid lower abdomen   No placement date or time found. Catheter Location: Mid lower abdomen   Status Deaccessed   Dressing Status Clean, dry & intact   Dressing Gauze   Date of Last Dressing Change 08/28/23   Dialysis Type Continuous cycling   Catheter Care Given Yes   Drainage Description Clear;Yellow   Post-Treatment (Cycler)   Average Dwell Time (Hours:Minutes) 1:23   Lost Dwell Time (Hours:Minutes) 0:07   Effluent Appearance Clear;Yellow   I Drain (mL) 50 mL   PD Output (mL) 1132 mL  (I drain 50ml + total UF 1082 = 1132 ml)       Report to primary RN Eitan Miller  No complications, prior to disconnect 2 minutes Alcavis scrub/soak performed, cap applied. Old cassette and tubing discarded in biohazard back. Bed in the lowest position, call bell within the reach.

## 2023-08-29 NOTE — CARE COORDINATION
Transition of Care Plan:    RUR: 22%-High  Prior Level of Functioning: Independent  Disposition: Unknown at this time  Barriers to discharge:   Plan for CABG on Thursday   Will need to switch to HD post-op. <<-----Click here for Discharge Medication Review

## 2023-08-30 ENCOUNTER — ANESTHESIA (OUTPATIENT)
Facility: HOSPITAL | Age: 68
End: 2023-08-30
Payer: MEDICARE

## 2023-08-30 ENCOUNTER — APPOINTMENT (OUTPATIENT)
Facility: HOSPITAL | Age: 68
DRG: 853 | End: 2023-08-30
Attending: INTERNAL MEDICINE
Payer: MEDICARE

## 2023-08-30 ENCOUNTER — ANESTHESIA EVENT (OUTPATIENT)
Facility: HOSPITAL | Age: 68
End: 2023-08-30
Payer: MEDICARE

## 2023-08-30 LAB
ALBUMIN SERPL-MCNC: 2 G/DL (ref 3.5–5)
ANION GAP SERPL CALC-SCNC: 8 MMOL/L (ref 5–15)
BUN SERPL-MCNC: 22 MG/DL (ref 6–20)
BUN/CREAT SERPL: 3 (ref 12–20)
CALCIUM SERPL-MCNC: 8.1 MG/DL (ref 8.5–10.1)
CHLORIDE SERPL-SCNC: 104 MMOL/L (ref 97–108)
CO2 SERPL-SCNC: 26 MMOL/L (ref 21–32)
CREAT SERPL-MCNC: 6.66 MG/DL (ref 0.55–1.02)
GLUCOSE BLD STRIP.AUTO-MCNC: 191 MG/DL (ref 65–117)
GLUCOSE BLD STRIP.AUTO-MCNC: 227 MG/DL (ref 65–117)
GLUCOSE BLD STRIP.AUTO-MCNC: 278 MG/DL (ref 65–117)
GLUCOSE BLD STRIP.AUTO-MCNC: 306 MG/DL (ref 65–117)
GLUCOSE SERPL-MCNC: 217 MG/DL (ref 65–100)
PHOSPHATE SERPL-MCNC: 3.6 MG/DL (ref 2.6–4.7)
POTASSIUM SERPL-SCNC: 4.7 MMOL/L (ref 3.5–5.1)
SERVICE CMNT-IMP: ABNORMAL
SODIUM SERPL-SCNC: 138 MMOL/L (ref 136–145)

## 2023-08-30 PROCEDURE — 6370000000 HC RX 637 (ALT 250 FOR IP): Performed by: HOSPITALIST

## 2023-08-30 PROCEDURE — 36415 COLL VENOUS BLD VENIPUNCTURE: CPT

## 2023-08-30 PROCEDURE — 6370000000 HC RX 637 (ALT 250 FOR IP): Performed by: NURSE PRACTITIONER

## 2023-08-30 PROCEDURE — 6360000002 HC RX W HCPCS: Performed by: INTERNAL MEDICINE

## 2023-08-30 PROCEDURE — 2580000003 HC RX 258: Performed by: INTERNAL MEDICINE

## 2023-08-30 PROCEDURE — 94010 BREATHING CAPACITY TEST: CPT

## 2023-08-30 PROCEDURE — 2580000003 HC RX 258: Performed by: STUDENT IN AN ORGANIZED HEALTH CARE EDUCATION/TRAINING PROGRAM

## 2023-08-30 PROCEDURE — 80069 RENAL FUNCTION PANEL: CPT

## 2023-08-30 PROCEDURE — 82962 GLUCOSE BLOOD TEST: CPT

## 2023-08-30 PROCEDURE — 6370000000 HC RX 637 (ALT 250 FOR IP): Performed by: STUDENT IN AN ORGANIZED HEALTH CARE EDUCATION/TRAINING PROGRAM

## 2023-08-30 PROCEDURE — 6370000000 HC RX 637 (ALT 250 FOR IP): Performed by: INTERNAL MEDICINE

## 2023-08-30 PROCEDURE — 2060000000 HC ICU INTERMEDIATE R&B

## 2023-08-30 RX ADMIN — SODIUM CHLORIDE, PRESERVATIVE FREE 10 ML: 5 INJECTION INTRAVENOUS at 09:43

## 2023-08-30 RX ADMIN — CLARITHROMYCIN 250 MG: 500 TABLET, FILM COATED ORAL at 09:42

## 2023-08-30 RX ADMIN — Medication 4 UNITS: at 21:52

## 2023-08-30 RX ADMIN — PANTOPRAZOLE SODIUM 40 MG: 40 TABLET, DELAYED RELEASE ORAL at 04:32

## 2023-08-30 RX ADMIN — ERYTHROPOIETIN 20000 UNITS: 20000 INJECTION, SOLUTION INTRAVENOUS; SUBCUTANEOUS at 09:47

## 2023-08-30 RX ADMIN — BUMETANIDE 2 MG: 1 TABLET ORAL at 09:42

## 2023-08-30 RX ADMIN — Medication 1 MG: at 09:42

## 2023-08-30 RX ADMIN — CLARITHROMYCIN 250 MG: 500 TABLET, FILM COATED ORAL at 21:46

## 2023-08-30 RX ADMIN — CARVEDILOL 12.5 MG: 12.5 TABLET, FILM COATED ORAL at 16:20

## 2023-08-30 RX ADMIN — Medication 10 ML: at 21:47

## 2023-08-30 RX ADMIN — GENTAMICIN SULFATE: 1 CREAM TOPICAL at 16:40

## 2023-08-30 RX ADMIN — DULOXETINE 20 MG: 20 CAPSULE, DELAYED RELEASE ORAL at 09:42

## 2023-08-30 RX ADMIN — CARVEDILOL 12.5 MG: 12.5 TABLET, FILM COATED ORAL at 09:43

## 2023-08-30 RX ADMIN — AMLODIPINE BESYLATE 10 MG: 5 TABLET ORAL at 09:42

## 2023-08-30 RX ADMIN — SODIUM CHLORIDE, SODIUM LACTATE, CALCIUM CHLORIDE, MAGNESIUM CHLORIDE AND DEXTROSE 6000 ML: 2.5; 538; 448; 18.3; 5.08 INJECTION, SOLUTION INTRAPERITONEAL at 16:40

## 2023-08-30 RX ADMIN — ROSUVASTATIN 40 MG: 40 TABLET, FILM COATED ORAL at 09:47

## 2023-08-30 RX ADMIN — OXYCODONE HYDROCHLORIDE 5 MG: 5 TABLET ORAL at 13:02

## 2023-08-30 RX ADMIN — Medication 10 ML: at 09:42

## 2023-08-30 RX ADMIN — MONTELUKAST 10 MG: 10 TABLET, FILM COATED ORAL at 21:46

## 2023-08-30 RX ADMIN — SODIUM CHLORIDE, PRESERVATIVE FREE 10 ML: 5 INJECTION INTRAVENOUS at 21:47

## 2023-08-30 RX ADMIN — METRONIDAZOLE 500 MG: 250 TABLET ORAL at 09:42

## 2023-08-30 RX ADMIN — DULOXETINE 20 MG: 20 CAPSULE, DELAYED RELEASE ORAL at 21:46

## 2023-08-30 RX ADMIN — OXYCODONE HYDROCHLORIDE 5 MG: 5 TABLET ORAL at 04:32

## 2023-08-30 RX ADMIN — OXYCODONE HYDROCHLORIDE 5 MG: 5 TABLET ORAL at 20:20

## 2023-08-30 RX ADMIN — METRONIDAZOLE 500 MG: 250 TABLET ORAL at 21:46

## 2023-08-30 RX ADMIN — PANTOPRAZOLE SODIUM 40 MG: 40 TABLET, DELAYED RELEASE ORAL at 16:20

## 2023-08-30 RX ADMIN — Medication 2 UNITS: at 13:03

## 2023-08-30 ASSESSMENT — PAIN SCALES - GENERAL
PAINLEVEL_OUTOF10: 0
PAINLEVEL_OUTOF10: 0
PAINLEVEL_OUTOF10: 7
PAINLEVEL_OUTOF10: 7
PAINLEVEL_OUTOF10: 8

## 2023-08-30 ASSESSMENT — PAIN DESCRIPTION - DESCRIPTORS
DESCRIPTORS: ACHING

## 2023-08-30 ASSESSMENT — PAIN - FUNCTIONAL ASSESSMENT: PAIN_FUNCTIONAL_ASSESSMENT: ACTIVITIES ARE NOT PREVENTED

## 2023-08-30 ASSESSMENT — PAIN DESCRIPTION - LOCATION
LOCATION: BACK

## 2023-08-30 ASSESSMENT — PAIN DESCRIPTION - ORIENTATION
ORIENTATION: LOWER
ORIENTATION: POSTERIOR
ORIENTATION: MID

## 2023-08-30 ASSESSMENT — PAIN DESCRIPTION - PAIN TYPE: TYPE: CHRONIC PAIN

## 2023-08-30 ASSESSMENT — PAIN DESCRIPTION - FREQUENCY: FREQUENCY: CONTINUOUS

## 2023-08-30 ASSESSMENT — PAIN DESCRIPTION - ONSET: ONSET: ON-GOING

## 2023-08-31 ENCOUNTER — APPOINTMENT (OUTPATIENT)
Facility: HOSPITAL | Age: 68
DRG: 853 | End: 2023-08-31
Attending: INTERNAL MEDICINE
Payer: MEDICARE

## 2023-08-31 ENCOUNTER — APPOINTMENT (OUTPATIENT)
Facility: HOSPITAL | Age: 68
DRG: 853 | End: 2023-08-31
Payer: MEDICARE

## 2023-08-31 LAB
ALBUMIN SERPL-MCNC: 2 G/DL (ref 3.5–5)
ANION GAP SERPL CALC-SCNC: 8 MMOL/L (ref 5–15)
BUN SERPL-MCNC: 22 MG/DL (ref 6–20)
BUN/CREAT SERPL: 3 (ref 12–20)
CALCIUM SERPL-MCNC: 8.3 MG/DL (ref 8.5–10.1)
CHLORIDE SERPL-SCNC: 105 MMOL/L (ref 97–108)
CO2 SERPL-SCNC: 27 MMOL/L (ref 21–32)
COMMENT:: NORMAL
CREAT SERPL-MCNC: 6.29 MG/DL (ref 0.55–1.02)
ECHO BSA: 1.91 M2
GLUCOSE BLD STRIP.AUTO-MCNC: 163 MG/DL (ref 65–117)
GLUCOSE BLD STRIP.AUTO-MCNC: 180 MG/DL (ref 65–117)
GLUCOSE BLD STRIP.AUTO-MCNC: 207 MG/DL (ref 65–117)
GLUCOSE BLD STRIP.AUTO-MCNC: 224 MG/DL (ref 65–117)
GLUCOSE SERPL-MCNC: 189 MG/DL (ref 65–100)
MAGNESIUM SERPL-MCNC: 1.9 MG/DL (ref 1.6–2.4)
PHOSPHATE SERPL-MCNC: 3.7 MG/DL (ref 2.6–4.7)
POTASSIUM SERPL-SCNC: 4.3 MMOL/L (ref 3.5–5.1)
SERVICE CMNT-IMP: ABNORMAL
SODIUM SERPL-SCNC: 140 MMOL/L (ref 136–145)
SPECIMEN HOLD: NORMAL

## 2023-08-31 PROCEDURE — 94760 N-INVAS EAR/PLS OXIMETRY 1: CPT

## 2023-08-31 PROCEDURE — 2580000003 HC RX 258: Performed by: INTERNAL MEDICINE

## 2023-08-31 PROCEDURE — 90945 DIALYSIS ONE EVALUATION: CPT

## 2023-08-31 PROCEDURE — 6360000002 HC RX W HCPCS: Performed by: NURSE ANESTHETIST, CERTIFIED REGISTERED

## 2023-08-31 PROCEDURE — 80069 RENAL FUNCTION PANEL: CPT

## 2023-08-31 PROCEDURE — B246ZZ4 ULTRASONOGRAPHY OF RIGHT AND LEFT HEART, TRANSESOPHAGEAL: ICD-10-PCS | Performed by: STUDENT IN AN ORGANIZED HEALTH CARE EDUCATION/TRAINING PROGRAM

## 2023-08-31 PROCEDURE — 6370000000 HC RX 637 (ALT 250 FOR IP): Performed by: INTERNAL MEDICINE

## 2023-08-31 PROCEDURE — 99231 SBSQ HOSP IP/OBS SF/LOW 25: CPT | Performed by: NURSE PRACTITIONER

## 2023-08-31 PROCEDURE — 6370000000 HC RX 637 (ALT 250 FOR IP): Performed by: HOSPITALIST

## 2023-08-31 PROCEDURE — 83735 ASSAY OF MAGNESIUM: CPT

## 2023-08-31 PROCEDURE — 3700000000 HC ANESTHESIA ATTENDED CARE

## 2023-08-31 PROCEDURE — 3700000001 HC ADD 15 MINUTES (ANESTHESIA)

## 2023-08-31 PROCEDURE — 82962 GLUCOSE BLOOD TEST: CPT

## 2023-08-31 PROCEDURE — 36415 COLL VENOUS BLD VENIPUNCTURE: CPT

## 2023-08-31 PROCEDURE — 2060000000 HC ICU INTERMEDIATE R&B

## 2023-08-31 PROCEDURE — 6370000000 HC RX 637 (ALT 250 FOR IP): Performed by: STUDENT IN AN ORGANIZED HEALTH CARE EDUCATION/TRAINING PROGRAM

## 2023-08-31 PROCEDURE — C8925 2D TEE W OR W/O FOL W/CON,IN: HCPCS

## 2023-08-31 PROCEDURE — 93970 EXTREMITY STUDY: CPT

## 2023-08-31 PROCEDURE — 97535 SELF CARE MNGMENT TRAINING: CPT

## 2023-08-31 PROCEDURE — 2580000003 HC RX 258: Performed by: STUDENT IN AN ORGANIZED HEALTH CARE EDUCATION/TRAINING PROGRAM

## 2023-08-31 PROCEDURE — 6370000000 HC RX 637 (ALT 250 FOR IP): Performed by: NURSE PRACTITIONER

## 2023-08-31 RX ADMIN — METRONIDAZOLE 500 MG: 250 TABLET ORAL at 09:27

## 2023-08-31 RX ADMIN — CARVEDILOL 12.5 MG: 12.5 TABLET, FILM COATED ORAL at 07:44

## 2023-08-31 RX ADMIN — CLARITHROMYCIN 250 MG: 500 TABLET, FILM COATED ORAL at 20:41

## 2023-08-31 RX ADMIN — Medication 10 ML: at 20:44

## 2023-08-31 RX ADMIN — METRONIDAZOLE 500 MG: 250 TABLET ORAL at 20:41

## 2023-08-31 RX ADMIN — CARVEDILOL 12.5 MG: 12.5 TABLET, FILM COATED ORAL at 17:02

## 2023-08-31 RX ADMIN — Medication 1 MG: at 07:44

## 2023-08-31 RX ADMIN — PROPOFOL 20 MG: 10 INJECTION, EMULSION INTRAVENOUS at 10:45

## 2023-08-31 RX ADMIN — ROSUVASTATIN 40 MG: 40 TABLET, FILM COATED ORAL at 07:44

## 2023-08-31 RX ADMIN — SODIUM CHLORIDE, SODIUM LACTATE, CALCIUM CHLORIDE, MAGNESIUM CHLORIDE AND DEXTROSE 6000 ML: 2.5; 538; 448; 18.3; 5.08 INJECTION, SOLUTION INTRAPERITONEAL at 17:21

## 2023-08-31 RX ADMIN — Medication 2 UNITS: at 17:02

## 2023-08-31 RX ADMIN — PROPOFOL 30 MG: 10 INJECTION, EMULSION INTRAVENOUS at 10:50

## 2023-08-31 RX ADMIN — SODIUM CHLORIDE, PRESERVATIVE FREE 10 ML: 5 INJECTION INTRAVENOUS at 20:44

## 2023-08-31 RX ADMIN — SODIUM CHLORIDE, PRESERVATIVE FREE 10 ML: 5 INJECTION INTRAVENOUS at 07:45

## 2023-08-31 RX ADMIN — AMLODIPINE BESYLATE 10 MG: 5 TABLET ORAL at 07:44

## 2023-08-31 RX ADMIN — PROPOFOL 20 MG: 10 INJECTION, EMULSION INTRAVENOUS at 10:43

## 2023-08-31 RX ADMIN — PANTOPRAZOLE SODIUM 40 MG: 40 TABLET, DELAYED RELEASE ORAL at 17:02

## 2023-08-31 RX ADMIN — Medication 10 ML: at 07:46

## 2023-08-31 RX ADMIN — CLARITHROMYCIN 250 MG: 500 TABLET, FILM COATED ORAL at 09:27

## 2023-08-31 RX ADMIN — OXYCODONE HYDROCHLORIDE 5 MG: 5 TABLET ORAL at 07:44

## 2023-08-31 RX ADMIN — PROPOFOL 20 MG: 10 INJECTION, EMULSION INTRAVENOUS at 10:47

## 2023-08-31 RX ADMIN — PROPOFOL 40 MG: 10 INJECTION, EMULSION INTRAVENOUS at 10:40

## 2023-08-31 RX ADMIN — PANTOPRAZOLE SODIUM 40 MG: 40 TABLET, DELAYED RELEASE ORAL at 07:45

## 2023-08-31 RX ADMIN — MONTELUKAST 10 MG: 10 TABLET, FILM COATED ORAL at 20:41

## 2023-08-31 RX ADMIN — DULOXETINE 20 MG: 20 CAPSULE, DELAYED RELEASE ORAL at 07:44

## 2023-08-31 RX ADMIN — OXYCODONE HYDROCHLORIDE 5 MG: 5 TABLET ORAL at 20:40

## 2023-08-31 RX ADMIN — BUMETANIDE 2 MG: 1 TABLET ORAL at 07:44

## 2023-08-31 RX ADMIN — SODIUM CHLORIDE, SODIUM LACTATE, CALCIUM CHLORIDE, MAGNESIUM CHLORIDE AND DEXTROSE 6000 ML: 2.5; 538; 448; 18.3; 5.08 INJECTION, SOLUTION INTRAPERITONEAL at 17:22

## 2023-08-31 RX ADMIN — DULOXETINE 20 MG: 20 CAPSULE, DELAYED RELEASE ORAL at 20:40

## 2023-08-31 RX ADMIN — GENTAMICIN SULFATE: 1 CREAM TOPICAL at 17:22

## 2023-08-31 ASSESSMENT — PAIN SCALES - GENERAL
PAINLEVEL_OUTOF10: 9
PAINLEVEL_OUTOF10: 9
PAINLEVEL_OUTOF10: 0

## 2023-08-31 ASSESSMENT — ENCOUNTER SYMPTOMS: SHORTNESS OF BREATH: 1

## 2023-08-31 ASSESSMENT — PAIN DESCRIPTION - LOCATION: LOCATION: BACK

## 2023-08-31 ASSESSMENT — PAIN DESCRIPTION - DESCRIPTORS: DESCRIPTORS: ACHING

## 2023-08-31 ASSESSMENT — PAIN SCALES - WONG BAKER: WONGBAKER_NUMERICALRESPONSE: 0

## 2023-08-31 ASSESSMENT — PAIN DESCRIPTION - ORIENTATION: ORIENTATION: LOWER;POSTERIOR

## 2023-08-31 NOTE — ANESTHESIA POSTPROCEDURE EVALUATION
Department of Anesthesiology  Postprocedure Note    Patient: Paris Carlos  MRN: [id number]  YOB: 1955  Date of evaluation: 8/31/2023      Procedure Summary     Date: 08/31/23 Room / Location: [address] [place] [address] NON-INVASIVE CARDIOLOGY    Anesthesia Start: 0843 Anesthesia Stop: 4033    Procedure: SONIA (CONTRAST/3D PRN) Diagnosis: Bacteremia    Scheduled Providers: Shaina Sunshine DO Responsible Provider: Shaina Sunshine DO    Anesthesia Type: MAC ASA Status: 3          Anesthesia Type: No value filed.     Tati Phase I: Tati Score: 9    Tati Phase II:        Anesthesia Post Evaluation    Patient location during evaluation: PACU  Level of consciousness: awake  Airway patency: patent  Nausea & Vomiting: no nausea  Complications: no  Cardiovascular status: hemodynamically stable  Respiratory status: acceptable  Hydration status: stable  Multimodal analgesia pain management approach  Pain management: adequate

## 2023-08-31 NOTE — PROCEDURES
Procedure:  TRANSESOPHAGEAL ECHO     Indication: Bacteremia     Description:  The patient  was brought to the holding area in a fasting state. Both benefits and risks of the procedure were explained in detail to the patient. Patient received sedation by anesthesia team  Insertion of the probe was well tolerated. Patient tolerated the procedure well, there were no complications. PRELIMINARY FINDINGS  Normal biventricular systolic function  No vegetations noted. Mild to moderate mitral annular calcification, mild calcification of MV leaflets and mild regurgitation     (Full report to follow)      [facility]  08/31/23

## 2023-09-01 ENCOUNTER — APPOINTMENT (OUTPATIENT)
Facility: HOSPITAL | Age: 68
DRG: 853 | End: 2023-09-01
Attending: INTERNAL MEDICINE
Payer: MEDICARE

## 2023-09-01 LAB
ALBUMIN SERPL-MCNC: 1.9 G/DL (ref 3.5–5)
ANION GAP SERPL CALC-SCNC: 6 MMOL/L (ref 5–15)
BASOPHILS # BLD: 0.1 K/UL (ref 0–0.1)
BASOPHILS NFR BLD: 1 % (ref 0–1)
BUN SERPL-MCNC: 20 MG/DL (ref 6–20)
BUN/CREAT SERPL: 3 (ref 12–20)
CALCIUM SERPL-MCNC: 8.1 MG/DL (ref 8.5–10.1)
CHLORIDE SERPL-SCNC: 105 MMOL/L (ref 97–108)
CO2 SERPL-SCNC: 27 MMOL/L (ref 21–32)
CREAT SERPL-MCNC: 6.53 MG/DL (ref 0.55–1.02)
DIFFERENTIAL METHOD BLD: ABNORMAL
EOSINOPHIL # BLD: 0.3 K/UL (ref 0–0.4)
EOSINOPHIL NFR BLD: 5 % (ref 0–7)
ERYTHROCYTE [DISTWIDTH] IN BLOOD BY AUTOMATED COUNT: 18.5 % (ref 11.5–14.5)
GLUCOSE BLD STRIP.AUTO-MCNC: 199 MG/DL (ref 65–117)
GLUCOSE BLD STRIP.AUTO-MCNC: 228 MG/DL (ref 65–117)
GLUCOSE BLD STRIP.AUTO-MCNC: 231 MG/DL (ref 65–117)
GLUCOSE BLD STRIP.AUTO-MCNC: 314 MG/DL (ref 65–117)
GLUCOSE SERPL-MCNC: 233 MG/DL (ref 65–100)
HCT VFR BLD AUTO: 26.1 % (ref 35–47)
HGB BLD-MCNC: 8 G/DL (ref 11.5–16)
IMM GRANULOCYTES # BLD AUTO: 0 K/UL (ref 0–0.04)
IMM GRANULOCYTES NFR BLD AUTO: 1 % (ref 0–0.5)
INR PPP: 1.2 (ref 0.9–1.1)
LYMPHOCYTES # BLD: 1.6 K/UL (ref 0.8–3.5)
LYMPHOCYTES NFR BLD: 26 % (ref 12–49)
MCH RBC QN AUTO: 29.3 PG (ref 26–34)
MCHC RBC AUTO-ENTMCNC: 30.7 G/DL (ref 30–36.5)
MCV RBC AUTO: 95.6 FL (ref 80–99)
MONOCYTES # BLD: 0.7 K/UL (ref 0–1)
MONOCYTES NFR BLD: 12 % (ref 5–13)
NEUTS SEG # BLD: 3.3 K/UL (ref 1.8–8)
NEUTS SEG NFR BLD: 55 % (ref 32–75)
NRBC # BLD: 0.03 K/UL (ref 0–0.01)
NRBC BLD-RTO: 0.5 PER 100 WBC
PHOSPHATE SERPL-MCNC: 4 MG/DL (ref 2.6–4.7)
PLATELET # BLD AUTO: 247 K/UL (ref 150–400)
PMV BLD AUTO: 9.5 FL (ref 8.9–12.9)
POTASSIUM SERPL-SCNC: 4.1 MMOL/L (ref 3.5–5.1)
PROTHROMBIN TIME: 12.5 SEC (ref 9–11.1)
RBC # BLD AUTO: 2.73 M/UL (ref 3.8–5.2)
SERVICE CMNT-IMP: ABNORMAL
SODIUM SERPL-SCNC: 138 MMOL/L (ref 136–145)
VANCOMYCIN SERPL-MCNC: 15.2 UG/ML
WBC # BLD AUTO: 6.1 K/UL (ref 3.6–11)

## 2023-09-01 PROCEDURE — 6370000000 HC RX 637 (ALT 250 FOR IP): Performed by: INTERNAL MEDICINE

## 2023-09-01 PROCEDURE — 6360000002 HC RX W HCPCS

## 2023-09-01 PROCEDURE — 6370000000 HC RX 637 (ALT 250 FOR IP): Performed by: STUDENT IN AN ORGANIZED HEALTH CARE EDUCATION/TRAINING PROGRAM

## 2023-09-01 PROCEDURE — 2580000003 HC RX 258

## 2023-09-01 PROCEDURE — 97530 THERAPEUTIC ACTIVITIES: CPT

## 2023-09-01 PROCEDURE — 6370000000 HC RX 637 (ALT 250 FOR IP): Performed by: NURSE PRACTITIONER

## 2023-09-01 PROCEDURE — 94760 N-INVAS EAR/PLS OXIMETRY 1: CPT

## 2023-09-01 PROCEDURE — 2580000003 HC RX 258: Performed by: STUDENT IN AN ORGANIZED HEALTH CARE EDUCATION/TRAINING PROGRAM

## 2023-09-01 PROCEDURE — 85610 PROTHROMBIN TIME: CPT

## 2023-09-01 PROCEDURE — 82962 GLUCOSE BLOOD TEST: CPT

## 2023-09-01 PROCEDURE — 6360000002 HC RX W HCPCS: Performed by: INTERNAL MEDICINE

## 2023-09-01 PROCEDURE — 2580000003 HC RX 258: Performed by: INTERNAL MEDICINE

## 2023-09-01 PROCEDURE — 80202 ASSAY OF VANCOMYCIN: CPT

## 2023-09-01 PROCEDURE — 2060000000 HC ICU INTERMEDIATE R&B

## 2023-09-01 PROCEDURE — 97116 GAIT TRAINING THERAPY: CPT

## 2023-09-01 PROCEDURE — 36415 COLL VENOUS BLD VENIPUNCTURE: CPT

## 2023-09-01 PROCEDURE — 80069 RENAL FUNCTION PANEL: CPT

## 2023-09-01 PROCEDURE — 6370000000 HC RX 637 (ALT 250 FOR IP): Performed by: HOSPITALIST

## 2023-09-01 PROCEDURE — 90945 DIALYSIS ONE EVALUATION: CPT

## 2023-09-01 PROCEDURE — 85025 COMPLETE CBC W/AUTO DIFF WBC: CPT

## 2023-09-01 RX ADMIN — GENTAMICIN SULFATE: 1 CREAM TOPICAL at 15:32

## 2023-09-01 RX ADMIN — OXYCODONE HYDROCHLORIDE 5 MG: 5 TABLET ORAL at 21:16

## 2023-09-01 RX ADMIN — Medication 2 UNITS: at 08:52

## 2023-09-01 RX ADMIN — VANCOMYCIN HYDROCHLORIDE 1250 MG: 1.25 INJECTION, POWDER, LYOPHILIZED, FOR SOLUTION INTRAVENOUS at 17:22

## 2023-09-01 RX ADMIN — SODIUM CHLORIDE, SODIUM LACTATE, CALCIUM CHLORIDE, MAGNESIUM CHLORIDE AND DEXTROSE 6000 ML: 2.5; 538; 448; 18.3; 5.08 INJECTION, SOLUTION INTRAPERITONEAL at 15:31

## 2023-09-01 RX ADMIN — Medication 4 UNITS: at 21:15

## 2023-09-01 RX ADMIN — AMLODIPINE BESYLATE 10 MG: 5 TABLET ORAL at 08:52

## 2023-09-01 RX ADMIN — METRONIDAZOLE 500 MG: 250 TABLET ORAL at 21:15

## 2023-09-01 RX ADMIN — IRON SUCROSE 500 MG: 20 INJECTION, SOLUTION INTRAVENOUS at 12:03

## 2023-09-01 RX ADMIN — OXYCODONE HYDROCHLORIDE 5 MG: 5 TABLET ORAL at 12:03

## 2023-09-01 RX ADMIN — Medication 10 ML: at 21:17

## 2023-09-01 RX ADMIN — CLARITHROMYCIN 250 MG: 500 TABLET, FILM COATED ORAL at 21:15

## 2023-09-01 RX ADMIN — OXYCODONE HYDROCHLORIDE 5 MG: 5 TABLET ORAL at 05:16

## 2023-09-01 RX ADMIN — ROSUVASTATIN 40 MG: 40 TABLET, FILM COATED ORAL at 08:52

## 2023-09-01 RX ADMIN — Medication 1 MG: at 08:52

## 2023-09-01 RX ADMIN — ERYTHROPOIETIN 20000 UNITS: 20000 INJECTION, SOLUTION INTRAVENOUS; SUBCUTANEOUS at 12:02

## 2023-09-01 RX ADMIN — DULOXETINE 20 MG: 20 CAPSULE, DELAYED RELEASE ORAL at 21:16

## 2023-09-01 RX ADMIN — SODIUM CHLORIDE, PRESERVATIVE FREE 10 ML: 5 INJECTION INTRAVENOUS at 08:53

## 2023-09-01 RX ADMIN — Medication 2 UNITS: at 12:03

## 2023-09-01 RX ADMIN — MONTELUKAST 10 MG: 10 TABLET, FILM COATED ORAL at 21:16

## 2023-09-01 RX ADMIN — SODIUM CHLORIDE, PRESERVATIVE FREE 10 ML: 5 INJECTION INTRAVENOUS at 21:17

## 2023-09-01 RX ADMIN — PANTOPRAZOLE SODIUM 40 MG: 40 TABLET, DELAYED RELEASE ORAL at 08:23

## 2023-09-01 RX ADMIN — CARVEDILOL 12.5 MG: 12.5 TABLET, FILM COATED ORAL at 08:52

## 2023-09-01 RX ADMIN — METRONIDAZOLE 500 MG: 250 TABLET ORAL at 08:52

## 2023-09-01 RX ADMIN — PANTOPRAZOLE SODIUM 40 MG: 40 TABLET, DELAYED RELEASE ORAL at 17:23

## 2023-09-01 RX ADMIN — CARVEDILOL 12.5 MG: 12.5 TABLET, FILM COATED ORAL at 17:23

## 2023-09-01 RX ADMIN — CLARITHROMYCIN 250 MG: 500 TABLET, FILM COATED ORAL at 08:52

## 2023-09-01 RX ADMIN — BUMETANIDE 2 MG: 1 TABLET ORAL at 08:52

## 2023-09-01 RX ADMIN — DULOXETINE 20 MG: 20 CAPSULE, DELAYED RELEASE ORAL at 08:52

## 2023-09-01 ASSESSMENT — PAIN DESCRIPTION - LOCATION
LOCATION: BACK

## 2023-09-01 ASSESSMENT — PAIN DESCRIPTION - PAIN TYPE
TYPE: CHRONIC PAIN

## 2023-09-01 ASSESSMENT — PAIN DESCRIPTION - DESCRIPTORS
DESCRIPTORS: ACHING

## 2023-09-01 ASSESSMENT — PAIN DESCRIPTION - ONSET: ONSET: ON-GOING

## 2023-09-01 ASSESSMENT — PAIN SCALES - GENERAL
PAINLEVEL_OUTOF10: 7
PAINLEVEL_OUTOF10: 3
PAINLEVEL_OUTOF10: 7
PAINLEVEL_OUTOF10: 8
PAINLEVEL_OUTOF10: 2
PAINLEVEL_OUTOF10: 0
PAINLEVEL_OUTOF10: 3
PAINLEVEL_OUTOF10: 3

## 2023-09-01 ASSESSMENT — PAIN DESCRIPTION - ORIENTATION
ORIENTATION: LOWER
ORIENTATION: LOWER
ORIENTATION: MID
ORIENTATION: MID

## 2023-09-01 ASSESSMENT — PAIN - FUNCTIONAL ASSESSMENT: PAIN_FUNCTIONAL_ASSESSMENT: ACTIVITIES ARE NOT PREVENTED

## 2023-09-01 ASSESSMENT — PAIN DESCRIPTION - FREQUENCY: FREQUENCY: CONTINUOUS

## 2023-09-02 ENCOUNTER — APPOINTMENT (OUTPATIENT)
Facility: HOSPITAL | Age: 68
DRG: 853 | End: 2023-09-02
Payer: MEDICARE

## 2023-09-02 LAB
ALBUMIN SERPL-MCNC: 2 G/DL (ref 3.5–5)
ANION GAP SERPL CALC-SCNC: 7 MMOL/L (ref 5–15)
APPEARANCE UR: CLEAR
APTT PPP: 31.3 SEC (ref 22.1–31)
BACTERIA URNS QL MICRO: NEGATIVE /HPF
BILIRUB UR QL: NEGATIVE
BUN SERPL-MCNC: 19 MG/DL (ref 6–20)
BUN/CREAT SERPL: 3 (ref 12–20)
CALCIUM SERPL-MCNC: 8 MG/DL (ref 8.5–10.1)
CHLORIDE SERPL-SCNC: 105 MMOL/L (ref 97–108)
CO2 SERPL-SCNC: 28 MMOL/L (ref 21–32)
COLOR UR: ABNORMAL
COMMENT:: NORMAL
CREAT SERPL-MCNC: 6.42 MG/DL (ref 0.55–1.02)
ECHO BSA: 2.02 M2
EPITH CASTS URNS QL MICRO: ABNORMAL /LPF
GLUCOSE BLD STRIP.AUTO-MCNC: 165 MG/DL (ref 65–117)
GLUCOSE BLD STRIP.AUTO-MCNC: 176 MG/DL (ref 65–117)
GLUCOSE BLD STRIP.AUTO-MCNC: 183 MG/DL (ref 65–117)
GLUCOSE BLD STRIP.AUTO-MCNC: 220 MG/DL (ref 65–117)
GLUCOSE SERPL-MCNC: 199 MG/DL (ref 65–100)
GLUCOSE UR STRIP.AUTO-MCNC: NEGATIVE MG/DL
HGB UR QL STRIP: ABNORMAL
KETONES UR QL STRIP.AUTO: NEGATIVE MG/DL
LEUKOCYTE ESTERASE UR QL STRIP.AUTO: NEGATIVE
NITRITE UR QL STRIP.AUTO: NEGATIVE
NT PRO BNP: ABNORMAL PG/ML
PH UR STRIP: 8 (ref 5–8)
PHOSPHATE SERPL-MCNC: 4 MG/DL (ref 2.6–4.7)
POTASSIUM SERPL-SCNC: 4 MMOL/L (ref 3.5–5.1)
PROT UR STRIP-MCNC: >300 MG/DL
RBC #/AREA URNS HPF: ABNORMAL /HPF (ref 0–5)
SERVICE CMNT-IMP: ABNORMAL
SODIUM SERPL-SCNC: 140 MMOL/L (ref 136–145)
SP GR UR REFRACTOMETRY: 1.01 (ref 1–1.03)
SPECIMEN HOLD: NORMAL
T4 FREE SERPL-MCNC: 1 NG/DL (ref 0.8–1.5)
THERAPEUTIC RANGE: ABNORMAL SECS (ref 58–77)
TSH SERPL DL<=0.05 MIU/L-ACNC: 5.96 UIU/ML (ref 0.36–3.74)
URINE CULTURE IF INDICATED: ABNORMAL
UROBILINOGEN UR QL STRIP.AUTO: 0.2 EU/DL (ref 0.2–1)
VAS LEFT GSV ANKLE DIAM: 1.4 MM
VAS LEFT GSV AT KNEE DIAM: 3.2 MM
VAS LEFT GSV BK PROX DIAM: 2.3 MM
VAS LEFT GSV THIGH DIST DIAM: 2.5 MM
VAS LEFT GSV THIGH MID DIAM: 3.4 MM
VAS LEFT GSV THIGH PROX DIAM: 3.8 MM
VAS LEFT SSV DIST DIAM: 1.1 MM
VAS LEFT SSV PROX DIAM: 1.6 MM
VAS RIGHT GSV ANKLE DIAM: 0.9 MM
VAS RIGHT GSV AT KNEE DIAM: 2.2 MM
VAS RIGHT GSV BK PROX DIAM: 1.6 MM
VAS RIGHT GSV THIGH DIST DIAM: 2.8 MM
VAS RIGHT GSV THIGH MID DIAM: 2.4 MM
VAS RIGHT GSV THIGH PROX DIAM: 2.6 MM
VAS RIGHT SSV DIST DIAM: 1.1 MM
VAS RIGHT SSV PROX DIAM: 1.8 MM
WBC URNS QL MICRO: ABNORMAL /HPF (ref 0–4)

## 2023-09-02 PROCEDURE — 6370000000 HC RX 637 (ALT 250 FOR IP): Performed by: STUDENT IN AN ORGANIZED HEALTH CARE EDUCATION/TRAINING PROGRAM

## 2023-09-02 PROCEDURE — 2580000003 HC RX 258: Performed by: STUDENT IN AN ORGANIZED HEALTH CARE EDUCATION/TRAINING PROGRAM

## 2023-09-02 PROCEDURE — 80069 RENAL FUNCTION PANEL: CPT

## 2023-09-02 PROCEDURE — 82962 GLUCOSE BLOOD TEST: CPT

## 2023-09-02 PROCEDURE — 36415 COLL VENOUS BLD VENIPUNCTURE: CPT

## 2023-09-02 PROCEDURE — 6370000000 HC RX 637 (ALT 250 FOR IP): Performed by: INTERNAL MEDICINE

## 2023-09-02 PROCEDURE — 94760 N-INVAS EAR/PLS OXIMETRY 1: CPT

## 2023-09-02 PROCEDURE — 84439 ASSAY OF FREE THYROXINE: CPT

## 2023-09-02 PROCEDURE — 2580000003 HC RX 258: Performed by: INTERNAL MEDICINE

## 2023-09-02 PROCEDURE — 2060000000 HC ICU INTERMEDIATE R&B

## 2023-09-02 PROCEDURE — 84443 ASSAY THYROID STIM HORMONE: CPT

## 2023-09-02 PROCEDURE — 6370000000 HC RX 637 (ALT 250 FOR IP): Performed by: NURSE PRACTITIONER

## 2023-09-02 PROCEDURE — 90945 DIALYSIS ONE EVALUATION: CPT

## 2023-09-02 PROCEDURE — 71046 X-RAY EXAM CHEST 2 VIEWS: CPT

## 2023-09-02 PROCEDURE — 6370000000 HC RX 637 (ALT 250 FOR IP): Performed by: HOSPITALIST

## 2023-09-02 PROCEDURE — 85730 THROMBOPLASTIN TIME PARTIAL: CPT

## 2023-09-02 PROCEDURE — 81001 URINALYSIS AUTO W/SCOPE: CPT

## 2023-09-02 PROCEDURE — 83880 ASSAY OF NATRIURETIC PEPTIDE: CPT

## 2023-09-02 RX ADMIN — PANTOPRAZOLE SODIUM 40 MG: 40 TABLET, DELAYED RELEASE ORAL at 06:55

## 2023-09-02 RX ADMIN — Medication 1 MG: at 08:37

## 2023-09-02 RX ADMIN — PANTOPRAZOLE SODIUM 40 MG: 40 TABLET, DELAYED RELEASE ORAL at 17:45

## 2023-09-02 RX ADMIN — METRONIDAZOLE 500 MG: 250 TABLET ORAL at 08:37

## 2023-09-02 RX ADMIN — METRONIDAZOLE 500 MG: 250 TABLET ORAL at 20:47

## 2023-09-02 RX ADMIN — OXYCODONE HYDROCHLORIDE 5 MG: 5 TABLET ORAL at 12:09

## 2023-09-02 RX ADMIN — DULOXETINE 20 MG: 20 CAPSULE, DELAYED RELEASE ORAL at 08:37

## 2023-09-02 RX ADMIN — SODIUM CHLORIDE, PRESERVATIVE FREE 10 ML: 5 INJECTION INTRAVENOUS at 09:10

## 2023-09-02 RX ADMIN — CARVEDILOL 12.5 MG: 12.5 TABLET, FILM COATED ORAL at 08:37

## 2023-09-02 RX ADMIN — ROSUVASTATIN 40 MG: 40 TABLET, FILM COATED ORAL at 08:37

## 2023-09-02 RX ADMIN — CLARITHROMYCIN 250 MG: 500 TABLET, FILM COATED ORAL at 09:10

## 2023-09-02 RX ADMIN — MONTELUKAST 10 MG: 10 TABLET, FILM COATED ORAL at 20:47

## 2023-09-02 RX ADMIN — AMLODIPINE BESYLATE 10 MG: 5 TABLET ORAL at 08:37

## 2023-09-02 RX ADMIN — CLARITHROMYCIN 250 MG: 500 TABLET, FILM COATED ORAL at 20:47

## 2023-09-02 RX ADMIN — Medication 10 ML: at 20:48

## 2023-09-02 RX ADMIN — SODIUM CHLORIDE, SODIUM LACTATE, CALCIUM CHLORIDE, MAGNESIUM CHLORIDE AND DEXTROSE 6000 ML: 2.5; 538; 448; 18.3; 5.08 INJECTION, SOLUTION INTRAPERITONEAL at 18:46

## 2023-09-02 RX ADMIN — Medication 10 ML: at 09:11

## 2023-09-02 RX ADMIN — DULOXETINE 20 MG: 20 CAPSULE, DELAYED RELEASE ORAL at 20:47

## 2023-09-02 RX ADMIN — OXYCODONE HYDROCHLORIDE 5 MG: 5 TABLET ORAL at 19:51

## 2023-09-02 RX ADMIN — OXYCODONE HYDROCHLORIDE 5 MG: 5 TABLET ORAL at 03:36

## 2023-09-02 RX ADMIN — BUMETANIDE 2 MG: 1 TABLET ORAL at 08:37

## 2023-09-02 RX ADMIN — GENTAMICIN SULFATE: 1 CREAM TOPICAL at 18:46

## 2023-09-02 RX ADMIN — SODIUM CHLORIDE, PRESERVATIVE FREE 10 ML: 5 INJECTION INTRAVENOUS at 20:48

## 2023-09-02 RX ADMIN — CARVEDILOL 12.5 MG: 12.5 TABLET, FILM COATED ORAL at 17:45

## 2023-09-02 ASSESSMENT — PAIN SCALES - GENERAL
PAINLEVEL_OUTOF10: 9
PAINLEVEL_OUTOF10: 4
PAINLEVEL_OUTOF10: 3
PAINLEVEL_OUTOF10: 0
PAINLEVEL_OUTOF10: 2
PAINLEVEL_OUTOF10: 7
PAINLEVEL_OUTOF10: 7

## 2023-09-02 ASSESSMENT — PAIN DESCRIPTION - FREQUENCY
FREQUENCY: CONTINUOUS
FREQUENCY: CONTINUOUS

## 2023-09-02 ASSESSMENT — PAIN DESCRIPTION - LOCATION
LOCATION: BACK

## 2023-09-02 ASSESSMENT — PAIN DESCRIPTION - ORIENTATION
ORIENTATION: LOWER
ORIENTATION: MID
ORIENTATION: LOWER

## 2023-09-02 ASSESSMENT — PAIN DESCRIPTION - ONSET
ONSET: ON-GOING
ONSET: ON-GOING

## 2023-09-02 ASSESSMENT — PAIN DESCRIPTION - PAIN TYPE
TYPE: CHRONIC PAIN
TYPE: CHRONIC PAIN

## 2023-09-02 ASSESSMENT — PAIN DESCRIPTION - DESCRIPTORS
DESCRIPTORS: ACHING

## 2023-09-03 LAB
ALBUMIN SERPL-MCNC: 2.1 G/DL (ref 3.5–5)
ANION GAP SERPL CALC-SCNC: 6 MMOL/L (ref 5–15)
BUN SERPL-MCNC: 19 MG/DL (ref 6–20)
BUN/CREAT SERPL: 3 (ref 12–20)
CALCIUM SERPL-MCNC: 8.2 MG/DL (ref 8.5–10.1)
CHLORIDE SERPL-SCNC: 103 MMOL/L (ref 97–108)
CO2 SERPL-SCNC: 29 MMOL/L (ref 21–32)
COMMENT:: NORMAL
CREAT SERPL-MCNC: 6.8 MG/DL (ref 0.55–1.02)
GLUCOSE BLD STRIP.AUTO-MCNC: 148 MG/DL (ref 65–117)
GLUCOSE BLD STRIP.AUTO-MCNC: 186 MG/DL (ref 65–117)
GLUCOSE BLD STRIP.AUTO-MCNC: 222 MG/DL (ref 65–117)
GLUCOSE BLD STRIP.AUTO-MCNC: 308 MG/DL (ref 65–117)
GLUCOSE SERPL-MCNC: 262 MG/DL (ref 65–100)
PHOSPHATE SERPL-MCNC: 4 MG/DL (ref 2.6–4.7)
POTASSIUM SERPL-SCNC: 4 MMOL/L (ref 3.5–5.1)
SERVICE CMNT-IMP: ABNORMAL
SODIUM SERPL-SCNC: 138 MMOL/L (ref 136–145)
SPECIMEN HOLD: NORMAL

## 2023-09-03 PROCEDURE — 2060000000 HC ICU INTERMEDIATE R&B

## 2023-09-03 PROCEDURE — 2580000003 HC RX 258: Performed by: INTERNAL MEDICINE

## 2023-09-03 PROCEDURE — 82962 GLUCOSE BLOOD TEST: CPT

## 2023-09-03 PROCEDURE — 6370000000 HC RX 637 (ALT 250 FOR IP): Performed by: NURSE PRACTITIONER

## 2023-09-03 PROCEDURE — 36415 COLL VENOUS BLD VENIPUNCTURE: CPT

## 2023-09-03 PROCEDURE — 6370000000 HC RX 637 (ALT 250 FOR IP): Performed by: STUDENT IN AN ORGANIZED HEALTH CARE EDUCATION/TRAINING PROGRAM

## 2023-09-03 PROCEDURE — 94760 N-INVAS EAR/PLS OXIMETRY 1: CPT

## 2023-09-03 PROCEDURE — 6370000000 HC RX 637 (ALT 250 FOR IP): Performed by: HOSPITALIST

## 2023-09-03 PROCEDURE — 6370000000 HC RX 637 (ALT 250 FOR IP): Performed by: INTERNAL MEDICINE

## 2023-09-03 PROCEDURE — 80069 RENAL FUNCTION PANEL: CPT

## 2023-09-03 RX ADMIN — DULOXETINE 20 MG: 20 CAPSULE, DELAYED RELEASE ORAL at 08:33

## 2023-09-03 RX ADMIN — Medication 4 UNITS: at 21:05

## 2023-09-03 RX ADMIN — SODIUM CHLORIDE, SODIUM LACTATE, CALCIUM CHLORIDE, MAGNESIUM CHLORIDE AND DEXTROSE 6000 ML: 2.5; 538; 448; 18.3; 5.08 INJECTION, SOLUTION INTRAPERITONEAL at 17:20

## 2023-09-03 RX ADMIN — BUMETANIDE 2 MG: 1 TABLET ORAL at 08:33

## 2023-09-03 RX ADMIN — METRONIDAZOLE 500 MG: 250 TABLET ORAL at 08:33

## 2023-09-03 RX ADMIN — SODIUM CHLORIDE, PRESERVATIVE FREE 10 ML: 5 INJECTION INTRAVENOUS at 08:35

## 2023-09-03 RX ADMIN — OXYCODONE HYDROCHLORIDE 5 MG: 5 TABLET ORAL at 08:33

## 2023-09-03 RX ADMIN — OXYCODONE HYDROCHLORIDE 5 MG: 5 TABLET ORAL at 17:38

## 2023-09-03 RX ADMIN — PANTOPRAZOLE SODIUM 40 MG: 40 TABLET, DELAYED RELEASE ORAL at 17:36

## 2023-09-03 RX ADMIN — Medication 2 UNITS: at 08:33

## 2023-09-03 RX ADMIN — DULOXETINE 20 MG: 20 CAPSULE, DELAYED RELEASE ORAL at 21:05

## 2023-09-03 RX ADMIN — ROSUVASTATIN 40 MG: 40 TABLET, FILM COATED ORAL at 08:33

## 2023-09-03 RX ADMIN — PANTOPRAZOLE SODIUM 40 MG: 40 TABLET, DELAYED RELEASE ORAL at 08:33

## 2023-09-03 RX ADMIN — SODIUM CHLORIDE, PRESERVATIVE FREE 10 ML: 5 INJECTION INTRAVENOUS at 21:05

## 2023-09-03 RX ADMIN — CLARITHROMYCIN 250 MG: 500 TABLET, FILM COATED ORAL at 21:05

## 2023-09-03 RX ADMIN — CARVEDILOL 12.5 MG: 12.5 TABLET, FILM COATED ORAL at 17:36

## 2023-09-03 RX ADMIN — AMLODIPINE BESYLATE 10 MG: 5 TABLET ORAL at 08:33

## 2023-09-03 RX ADMIN — METRONIDAZOLE 500 MG: 250 TABLET ORAL at 21:05

## 2023-09-03 RX ADMIN — CLARITHROMYCIN 250 MG: 500 TABLET, FILM COATED ORAL at 08:35

## 2023-09-03 RX ADMIN — GENTAMICIN SULFATE: 1 CREAM TOPICAL at 17:20

## 2023-09-03 RX ADMIN — MONTELUKAST 10 MG: 10 TABLET, FILM COATED ORAL at 21:05

## 2023-09-03 RX ADMIN — CARVEDILOL 12.5 MG: 12.5 TABLET, FILM COATED ORAL at 08:33

## 2023-09-03 RX ADMIN — Medication 1 MG: at 08:33

## 2023-09-03 RX ADMIN — OXYCODONE HYDROCHLORIDE 5 MG: 5 TABLET ORAL at 02:34

## 2023-09-03 ASSESSMENT — PAIN SCALES - GENERAL
PAINLEVEL_OUTOF10: 9
PAINLEVEL_OUTOF10: 3
PAINLEVEL_OUTOF10: 9
PAINLEVEL_OUTOF10: 2
PAINLEVEL_OUTOF10: 8

## 2023-09-03 ASSESSMENT — PAIN DESCRIPTION - LOCATION
LOCATION: BACK

## 2023-09-03 ASSESSMENT — PAIN DESCRIPTION - DESCRIPTORS
DESCRIPTORS: ACHING

## 2023-09-03 ASSESSMENT — PAIN DESCRIPTION - ORIENTATION
ORIENTATION: LOWER
ORIENTATION: MID
ORIENTATION: MID

## 2023-09-03 ASSESSMENT — PAIN DESCRIPTION - FREQUENCY: FREQUENCY: CONTINUOUS

## 2023-09-03 ASSESSMENT — PAIN - FUNCTIONAL ASSESSMENT: PAIN_FUNCTIONAL_ASSESSMENT: ACTIVITIES ARE NOT PREVENTED

## 2023-09-03 ASSESSMENT — PAIN SCALES - WONG BAKER: WONGBAKER_NUMERICALRESPONSE: 0

## 2023-09-03 ASSESSMENT — PAIN DESCRIPTION - ONSET: ONSET: ON-GOING

## 2023-09-03 ASSESSMENT — PAIN DESCRIPTION - PAIN TYPE: TYPE: CHRONIC PAIN

## 2023-09-03 NOTE — FLOWSHEET NOTE
09/03/23 0600   Peritoneal Dialysis Catheter Mid lower abdomen   No placement date or time found. Catheter Location: Mid lower abdomen   Status Deaccessed   Site Condition Clean, dry, intact   Dressing Status Clean, dry & intact   Dressing Gauze   Date of Last Dressing Change 09/02/23   Dialysis Type Continuous cycling   Catheter Care Given Yes  (Two minute Alcavis scrub/soak prior to disconnection)   Post-Treatment (Cycler)   Average Dwell Time (Hours:Minutes) 1:19   Lost Dwell Time (Hours:Minutes) :26   Effluent Appearance Clear;Yellow   PD Output (mL) 797 mL  (Idrain 102 + total  = net )     Primary RN SBAR: Shaina Griffiths RN  Comments: PD catheter taped securely to the patient's abdomen. Used cassette, lines, and bags discarded in the red bin.

## 2023-09-04 PROBLEM — I50.30 DIASTOLIC HEART FAILURE (HCC): Status: ACTIVE | Noted: 2023-09-04

## 2023-09-04 LAB
ALBUMIN SERPL-MCNC: 2 G/DL (ref 3.5–5)
ANION GAP SERPL CALC-SCNC: 8 MMOL/L (ref 5–15)
BUN SERPL-MCNC: 18 MG/DL (ref 6–20)
BUN/CREAT SERPL: 3 (ref 12–20)
CALCIUM SERPL-MCNC: 7.7 MG/DL (ref 8.5–10.1)
CHLORIDE SERPL-SCNC: 104 MMOL/L (ref 97–108)
CO2 SERPL-SCNC: 27 MMOL/L (ref 21–32)
COMMENT:: NORMAL
CREAT SERPL-MCNC: 6.64 MG/DL (ref 0.55–1.02)
ERYTHROCYTE [DISTWIDTH] IN BLOOD BY AUTOMATED COUNT: 19.8 % (ref 11.5–14.5)
GLUCOSE BLD STRIP.AUTO-MCNC: 172 MG/DL (ref 65–117)
GLUCOSE BLD STRIP.AUTO-MCNC: 183 MG/DL (ref 65–117)
GLUCOSE BLD STRIP.AUTO-MCNC: 183 MG/DL (ref 65–117)
GLUCOSE BLD STRIP.AUTO-MCNC: 314 MG/DL (ref 65–117)
GLUCOSE SERPL-MCNC: 231 MG/DL (ref 65–100)
HCT VFR BLD AUTO: 28.9 % (ref 35–47)
HGB BLD-MCNC: 8.6 G/DL (ref 11.5–16)
MCH RBC QN AUTO: 29.6 PG (ref 26–34)
MCHC RBC AUTO-ENTMCNC: 29.8 G/DL (ref 30–36.5)
MCV RBC AUTO: 99.3 FL (ref 80–99)
NRBC # BLD: 0.02 K/UL (ref 0–0.01)
NRBC BLD-RTO: 0.3 PER 100 WBC
PHOSPHATE SERPL-MCNC: 4.2 MG/DL (ref 2.6–4.7)
PLATELET # BLD AUTO: 281 K/UL (ref 150–400)
PMV BLD AUTO: 10.1 FL (ref 8.9–12.9)
POTASSIUM SERPL-SCNC: 4 MMOL/L (ref 3.5–5.1)
RBC # BLD AUTO: 2.91 M/UL (ref 3.8–5.2)
SERVICE CMNT-IMP: ABNORMAL
SODIUM SERPL-SCNC: 139 MMOL/L (ref 136–145)
SPECIMEN HOLD: NORMAL
WBC # BLD AUTO: 6.9 K/UL (ref 3.6–11)

## 2023-09-04 PROCEDURE — 6370000000 HC RX 637 (ALT 250 FOR IP): Performed by: NURSE PRACTITIONER

## 2023-09-04 PROCEDURE — 6370000000 HC RX 637 (ALT 250 FOR IP): Performed by: FAMILY MEDICINE

## 2023-09-04 PROCEDURE — 2580000003 HC RX 258: Performed by: INTERNAL MEDICINE

## 2023-09-04 PROCEDURE — 2060000000 HC ICU INTERMEDIATE R&B

## 2023-09-04 PROCEDURE — 90945 DIALYSIS ONE EVALUATION: CPT

## 2023-09-04 PROCEDURE — 36415 COLL VENOUS BLD VENIPUNCTURE: CPT

## 2023-09-04 PROCEDURE — 2580000003 HC RX 258: Performed by: STUDENT IN AN ORGANIZED HEALTH CARE EDUCATION/TRAINING PROGRAM

## 2023-09-04 PROCEDURE — 80069 RENAL FUNCTION PANEL: CPT

## 2023-09-04 PROCEDURE — 6370000000 HC RX 637 (ALT 250 FOR IP): Performed by: STUDENT IN AN ORGANIZED HEALTH CARE EDUCATION/TRAINING PROGRAM

## 2023-09-04 PROCEDURE — 82962 GLUCOSE BLOOD TEST: CPT

## 2023-09-04 PROCEDURE — 6370000000 HC RX 637 (ALT 250 FOR IP): Performed by: INTERNAL MEDICINE

## 2023-09-04 PROCEDURE — 6360000002 HC RX W HCPCS: Performed by: INTERNAL MEDICINE

## 2023-09-04 PROCEDURE — 6370000000 HC RX 637 (ALT 250 FOR IP): Performed by: HOSPITALIST

## 2023-09-04 PROCEDURE — 85027 COMPLETE CBC AUTOMATED: CPT

## 2023-09-04 PROCEDURE — 94760 N-INVAS EAR/PLS OXIMETRY 1: CPT

## 2023-09-04 RX ADMIN — METRONIDAZOLE 500 MG: 250 TABLET ORAL at 09:00

## 2023-09-04 RX ADMIN — PANTOPRAZOLE SODIUM 40 MG: 40 TABLET, DELAYED RELEASE ORAL at 06:26

## 2023-09-04 RX ADMIN — Medication 1 MG: at 09:00

## 2023-09-04 RX ADMIN — OXYCODONE HYDROCHLORIDE 5 MG: 5 TABLET ORAL at 00:37

## 2023-09-04 RX ADMIN — MONTELUKAST 10 MG: 10 TABLET, FILM COATED ORAL at 20:16

## 2023-09-04 RX ADMIN — PANTOPRAZOLE SODIUM 40 MG: 40 TABLET, DELAYED RELEASE ORAL at 15:54

## 2023-09-04 RX ADMIN — DIPHENOXYLATE HYDROCHLORIDE AND ATROPINE SULFATE 1 TABLET: 2.5; .025 TABLET ORAL at 12:50

## 2023-09-04 RX ADMIN — OXYCODONE HYDROCHLORIDE 5 MG: 5 TABLET ORAL at 12:50

## 2023-09-04 RX ADMIN — SODIUM CHLORIDE, PRESERVATIVE FREE 10 ML: 5 INJECTION INTRAVENOUS at 09:00

## 2023-09-04 RX ADMIN — GENTAMICIN SULFATE: 1 CREAM TOPICAL at 17:03

## 2023-09-04 RX ADMIN — BUMETANIDE 2 MG: 1 TABLET ORAL at 09:00

## 2023-09-04 RX ADMIN — CARVEDILOL 12.5 MG: 12.5 TABLET, FILM COATED ORAL at 15:54

## 2023-09-04 RX ADMIN — SODIUM CHLORIDE, SODIUM LACTATE, CALCIUM CHLORIDE, MAGNESIUM CHLORIDE AND DEXTROSE 6000 ML: 2.5; 538; 448; 18.3; 5.08 INJECTION, SOLUTION INTRAPERITONEAL at 17:04

## 2023-09-04 RX ADMIN — METRONIDAZOLE 500 MG: 250 TABLET ORAL at 20:16

## 2023-09-04 RX ADMIN — AMLODIPINE BESYLATE 10 MG: 5 TABLET ORAL at 09:00

## 2023-09-04 RX ADMIN — DULOXETINE 20 MG: 20 CAPSULE, DELAYED RELEASE ORAL at 09:00

## 2023-09-04 RX ADMIN — OXYCODONE HYDROCHLORIDE 5 MG: 5 TABLET ORAL at 19:52

## 2023-09-04 RX ADMIN — Medication 4 UNITS: at 20:26

## 2023-09-04 RX ADMIN — ERYTHROPOIETIN 20000 UNITS: 20000 INJECTION, SOLUTION INTRAVENOUS; SUBCUTANEOUS at 09:00

## 2023-09-04 RX ADMIN — Medication 10 ML: at 20:32

## 2023-09-04 RX ADMIN — CLARITHROMYCIN 250 MG: 500 TABLET, FILM COATED ORAL at 20:16

## 2023-09-04 RX ADMIN — CARVEDILOL 12.5 MG: 12.5 TABLET, FILM COATED ORAL at 09:00

## 2023-09-04 RX ADMIN — ROSUVASTATIN 40 MG: 40 TABLET, FILM COATED ORAL at 09:00

## 2023-09-04 RX ADMIN — DULOXETINE 20 MG: 20 CAPSULE, DELAYED RELEASE ORAL at 20:16

## 2023-09-04 RX ADMIN — SODIUM CHLORIDE, SODIUM LACTATE, CALCIUM CHLORIDE, MAGNESIUM CHLORIDE AND DEXTROSE 6000 ML: 2.5; 538; 448; 18.3; 5.08 INJECTION, SOLUTION INTRAPERITONEAL at 17:03

## 2023-09-04 RX ADMIN — CLARITHROMYCIN 250 MG: 500 TABLET, FILM COATED ORAL at 09:00

## 2023-09-04 RX ADMIN — OXYCODONE HYDROCHLORIDE 5 MG: 5 TABLET ORAL at 06:26

## 2023-09-04 ASSESSMENT — PAIN SCALES - GENERAL
PAINLEVEL_OUTOF10: 0
PAINLEVEL_OUTOF10: 7
PAINLEVEL_OUTOF10: 6
PAINLEVEL_OUTOF10: 7
PAINLEVEL_OUTOF10: 0
PAINLEVEL_OUTOF10: 7

## 2023-09-04 ASSESSMENT — PAIN DESCRIPTION - DESCRIPTORS
DESCRIPTORS: ACHING
DESCRIPTORS: DISCOMFORT;ACHING
DESCRIPTORS: ACHING;DISCOMFORT
DESCRIPTORS: ACHING

## 2023-09-04 ASSESSMENT — PAIN DESCRIPTION - LOCATION
LOCATION: BACK

## 2023-09-04 ASSESSMENT — PAIN DESCRIPTION - ORIENTATION
ORIENTATION: MID
ORIENTATION: MID

## 2023-09-04 NOTE — FLOWSHEET NOTE
09/04/23 0800   Vitals   /62   Pulse 92   Respirations 18   Peritoneal Dialysis Catheter Mid lower abdomen   No placement date or time found. Catheter Location: Mid lower abdomen   Status Clamped   Site Condition Clean, dry, intact   Dressing Status Clean, dry & intact   Dressing Gauze   Date of Last Dressing Change 09/03/23   Dialysis Type Continuous cycling   Catheter Care Given Yes   Cycler   Informed Consent    (chronic consent applies)   Post-Treatment (Cycler)   Average Dwell Time (Hours:Minutes) 1:24   Lost Dwell Time (Hours:Minutes) 0:03   Effluent Appearance Other (Comment)  (unable to visualize, commode flushed)   PD Output (mL) 1290 mL  (idrain 115ml + uf 1175ml= 1290ml NET OUTPUT)        09/04/23 0800   Observations & Evaluations   Level of Consciousness 0   Oriented X 4   Respiratory Quality/Effort Unlabored   O2 Device None (Room air)   Skin Color   (appropriate for ethnicity)   Skin Condition/Temp Warm;Dry   Abdomen Inspection Soft;Rounded   Bowel Sounds (All Quadrants) Active   Vital Signs   /62   Pulse 92   Respirations 18   Pain Assessment   Pain Assessment None - Denies Pain   Technical Checks   ICEBOAT I;C;E;B;O;A;T     Pt orders, notes, labs and code status reviewed. Time out complete. Tx complete. Prior to disconnection, two minute Alcavis scrub/soak followed by sterile mini cap application. Line secured to abdomen. Pt has no complaints to palpation or related to pd overall. Upon departure, bed in lowest position, call bell and personal belongings within reach.  Education pre/post.

## 2023-09-05 LAB
GLUCOSE BLD STRIP.AUTO-MCNC: 225 MG/DL (ref 65–117)
GLUCOSE BLD STRIP.AUTO-MCNC: 267 MG/DL (ref 65–117)
GLUCOSE BLD STRIP.AUTO-MCNC: 273 MG/DL (ref 65–117)
GLUCOSE BLD STRIP.AUTO-MCNC: 276 MG/DL (ref 65–117)
SERVICE CMNT-IMP: ABNORMAL

## 2023-09-05 PROCEDURE — 6370000000 HC RX 637 (ALT 250 FOR IP): Performed by: HOSPITALIST

## 2023-09-05 PROCEDURE — 94760 N-INVAS EAR/PLS OXIMETRY 1: CPT

## 2023-09-05 PROCEDURE — 6370000000 HC RX 637 (ALT 250 FOR IP): Performed by: INTERNAL MEDICINE

## 2023-09-05 PROCEDURE — 82962 GLUCOSE BLOOD TEST: CPT

## 2023-09-05 PROCEDURE — 6370000000 HC RX 637 (ALT 250 FOR IP): Performed by: NURSE PRACTITIONER

## 2023-09-05 PROCEDURE — 90945 DIALYSIS ONE EVALUATION: CPT

## 2023-09-05 PROCEDURE — 2580000003 HC RX 258: Performed by: INTERNAL MEDICINE

## 2023-09-05 PROCEDURE — 2580000003 HC RX 258: Performed by: STUDENT IN AN ORGANIZED HEALTH CARE EDUCATION/TRAINING PROGRAM

## 2023-09-05 PROCEDURE — 6370000000 HC RX 637 (ALT 250 FOR IP): Performed by: STUDENT IN AN ORGANIZED HEALTH CARE EDUCATION/TRAINING PROGRAM

## 2023-09-05 PROCEDURE — 2060000000 HC ICU INTERMEDIATE R&B

## 2023-09-05 PROCEDURE — 97530 THERAPEUTIC ACTIVITIES: CPT

## 2023-09-05 PROCEDURE — 6370000000 HC RX 637 (ALT 250 FOR IP): Performed by: FAMILY MEDICINE

## 2023-09-05 RX ORDER — INSULIN GLARGINE 100 [IU]/ML
0.2 INJECTION, SOLUTION SUBCUTANEOUS NIGHTLY
Status: DISCONTINUED | OUTPATIENT
Start: 2023-09-05 | End: 2023-09-06

## 2023-09-05 RX ORDER — INSULIN GLARGINE 100 [IU]/ML
5 INJECTION, SOLUTION SUBCUTANEOUS
Status: CANCELLED | OUTPATIENT
Start: 2023-09-05

## 2023-09-05 RX ADMIN — Medication 1 MG: at 10:13

## 2023-09-05 RX ADMIN — METRONIDAZOLE 500 MG: 250 TABLET ORAL at 10:13

## 2023-09-05 RX ADMIN — Medication 10 ML: at 21:24

## 2023-09-05 RX ADMIN — Medication 4 UNITS: at 17:59

## 2023-09-05 RX ADMIN — DIPHENOXYLATE HYDROCHLORIDE AND ATROPINE SULFATE 1 TABLET: 2.5; .025 TABLET ORAL at 17:58

## 2023-09-05 RX ADMIN — CLARITHROMYCIN 250 MG: 500 TABLET, FILM COATED ORAL at 21:23

## 2023-09-05 RX ADMIN — METRONIDAZOLE 500 MG: 250 TABLET ORAL at 21:23

## 2023-09-05 RX ADMIN — BUMETANIDE 2 MG: 1 TABLET ORAL at 10:12

## 2023-09-05 RX ADMIN — CARVEDILOL 12.5 MG: 12.5 TABLET, FILM COATED ORAL at 17:58

## 2023-09-05 RX ADMIN — SODIUM CHLORIDE, PRESERVATIVE FREE 10 ML: 5 INJECTION INTRAVENOUS at 10:13

## 2023-09-05 RX ADMIN — ROSUVASTATIN 40 MG: 40 TABLET, FILM COATED ORAL at 10:12

## 2023-09-05 RX ADMIN — Medication 2 UNITS: at 10:13

## 2023-09-05 RX ADMIN — CLARITHROMYCIN 250 MG: 500 TABLET, FILM COATED ORAL at 10:12

## 2023-09-05 RX ADMIN — MONTELUKAST 10 MG: 10 TABLET, FILM COATED ORAL at 21:24

## 2023-09-05 RX ADMIN — OXYCODONE HYDROCHLORIDE 5 MG: 5 TABLET ORAL at 10:13

## 2023-09-05 RX ADMIN — PANTOPRAZOLE SODIUM 40 MG: 40 TABLET, DELAYED RELEASE ORAL at 06:22

## 2023-09-05 RX ADMIN — CARVEDILOL 12.5 MG: 12.5 TABLET, FILM COATED ORAL at 10:17

## 2023-09-05 RX ADMIN — DULOXETINE 20 MG: 20 CAPSULE, DELAYED RELEASE ORAL at 10:13

## 2023-09-05 RX ADMIN — GENTAMICIN SULFATE: 1 CREAM TOPICAL at 17:01

## 2023-09-05 RX ADMIN — PANTOPRAZOLE SODIUM 40 MG: 40 TABLET, DELAYED RELEASE ORAL at 17:59

## 2023-09-05 RX ADMIN — SODIUM CHLORIDE, SODIUM LACTATE, CALCIUM CHLORIDE, MAGNESIUM CHLORIDE AND DEXTROSE 6000 ML: 2.5; 538; 448; 18.3; 5.08 INJECTION, SOLUTION INTRAPERITONEAL at 17:02

## 2023-09-05 RX ADMIN — OXYCODONE HYDROCHLORIDE 5 MG: 5 TABLET ORAL at 17:58

## 2023-09-05 RX ADMIN — DULOXETINE 20 MG: 20 CAPSULE, DELAYED RELEASE ORAL at 21:23

## 2023-09-05 RX ADMIN — SODIUM CHLORIDE, SODIUM LACTATE, CALCIUM CHLORIDE, MAGNESIUM CHLORIDE AND DEXTROSE 6000 ML: 2.5; 538; 448; 18.3; 5.08 INJECTION, SOLUTION INTRAPERITONEAL at 17:03

## 2023-09-05 RX ADMIN — OXYCODONE HYDROCHLORIDE 5 MG: 5 TABLET ORAL at 02:21

## 2023-09-05 RX ADMIN — AMLODIPINE BESYLATE 10 MG: 5 TABLET ORAL at 10:12

## 2023-09-05 RX ADMIN — INSULIN GLARGINE 16 UNITS: 100 INJECTION, SOLUTION SUBCUTANEOUS at 21:24

## 2023-09-05 ASSESSMENT — PAIN SCALES - GENERAL
PAINLEVEL_OUTOF10: 7
PAINLEVEL_OUTOF10: 0
PAINLEVEL_OUTOF10: 7
PAINLEVEL_OUTOF10: 3
PAINLEVEL_OUTOF10: 9

## 2023-09-05 ASSESSMENT — PAIN DESCRIPTION - DESCRIPTORS
DESCRIPTORS: ACHING
DESCRIPTORS: ACHING

## 2023-09-05 ASSESSMENT — PAIN SCALES - WONG BAKER: WONGBAKER_NUMERICALRESPONSE: 0

## 2023-09-05 ASSESSMENT — PAIN DESCRIPTION - ORIENTATION
ORIENTATION: MID
ORIENTATION: MID

## 2023-09-05 ASSESSMENT — PAIN DESCRIPTION - LOCATION
LOCATION: BACK
LOCATION: BACK

## 2023-09-05 NOTE — FLOWSHEET NOTE
Peritoneal Dialysis Disconnection / [phone]    Primary RN SBAR: Ricardo, RN  Patient Education: access/site care    Hepatitis B Surface Ag   Date/Time Value Ref Range Status   08/25/2023 12:35 AM <0.10 Index Final     Hep B S Ab   Date/Time Value Ref Range Status   08/20/2023 10:01 AM <3.10 mIU/mL Final       09/05/23 0310   Peritoneal Dialysis Catheter Mid lower abdomen   No placement date or time found. Catheter Location: Mid lower abdomen   Status Deaccessed   Site Condition   (BRIAN- drsg C/D/I)   Dressing Status Clean, dry & intact   Dressing Gauze   Date of Last Dressing Change 09/04/23   Dialysis Type Continuous cycling   Catheter Care Given Yes   Post-Treatment (Cycler)   Average Dwell Time (Hours:Minutes) 1:25   Lost Dwell Time (Hours:Minutes) 0:00   Effluent Appearance Clear;Yellow   PD Output (mL) 262 mL  (i drain 1 ml +  ml = 262 ml NET)     Farrah Parks RN at bedside to disconnect pt from CCPD tx. Orders, consent, pt, and code status confirmed. Tx completed. Used cassette and bags discarded. Education and pre/post report given to primary RN.     Net Fluid Loss: 262 ml    Primary RN SBAR: Ricardo, RN

## 2023-09-06 ENCOUNTER — APPOINTMENT (OUTPATIENT)
Facility: HOSPITAL | Age: 68
DRG: 853 | End: 2023-09-06
Attending: INTERNAL MEDICINE
Payer: MEDICARE

## 2023-09-06 ENCOUNTER — ANESTHESIA EVENT (OUTPATIENT)
Facility: HOSPITAL | Age: 68
End: 2023-09-06
Payer: MEDICARE

## 2023-09-06 PROBLEM — R65.20 SEPSIS WITH ACUTE ORGAN DYSFUNCTION WITHOUT SEPTIC SHOCK (HCC): Status: ACTIVE | Noted: 2023-09-06

## 2023-09-06 PROBLEM — A41.9 SEPSIS WITH ACUTE ORGAN DYSFUNCTION WITHOUT SEPTIC SHOCK (HCC): Status: ACTIVE | Noted: 2023-09-06

## 2023-09-06 LAB
ALBUMIN SERPL-MCNC: 2.1 G/DL (ref 3.5–5)
ALBUMIN/GLOB SERPL: 0.4 (ref 1.1–2.2)
ALP SERPL-CCNC: 145 U/L (ref 45–117)
ALT SERPL-CCNC: 9 U/L (ref 12–78)
ANION GAP SERPL CALC-SCNC: 7 MMOL/L (ref 5–15)
AST SERPL-CCNC: 9 U/L (ref 15–37)
BILIRUB SERPL-MCNC: 0.5 MG/DL (ref 0.2–1)
BUN SERPL-MCNC: 18 MG/DL (ref 6–20)
BUN/CREAT SERPL: 3 (ref 12–20)
CALCIUM SERPL-MCNC: 8.5 MG/DL (ref 8.5–10.1)
CHLORIDE SERPL-SCNC: 102 MMOL/L (ref 97–108)
CO2 SERPL-SCNC: 31 MMOL/L (ref 21–32)
COMMENT:: NORMAL
CREAT SERPL-MCNC: 7.1 MG/DL (ref 0.55–1.02)
ERYTHROCYTE [DISTWIDTH] IN BLOOD BY AUTOMATED COUNT: 19.4 % (ref 11.5–14.5)
GLOBULIN SER CALC-MCNC: 4.9 G/DL (ref 2–4)
GLUCOSE BLD STRIP.AUTO-MCNC: 178 MG/DL (ref 65–117)
GLUCOSE BLD STRIP.AUTO-MCNC: 234 MG/DL (ref 65–117)
GLUCOSE BLD STRIP.AUTO-MCNC: 94 MG/DL (ref 65–117)
GLUCOSE BLD STRIP.AUTO-MCNC: 96 MG/DL (ref 65–117)
GLUCOSE SERPL-MCNC: 96 MG/DL (ref 65–100)
HCT VFR BLD AUTO: 31.7 % (ref 35–47)
HGB BLD-MCNC: 9.7 G/DL (ref 11.5–16)
HISTORY CHECK: NORMAL
MCH RBC QN AUTO: 29.6 PG (ref 26–34)
MCHC RBC AUTO-ENTMCNC: 30.6 G/DL (ref 30–36.5)
MCV RBC AUTO: 96.6 FL (ref 80–99)
NRBC # BLD: 0 K/UL (ref 0–0.01)
NRBC BLD-RTO: 0 PER 100 WBC
PLATELET # BLD AUTO: 288 K/UL (ref 150–400)
PMV BLD AUTO: 9.4 FL (ref 8.9–12.9)
POTASSIUM SERPL-SCNC: 3.2 MMOL/L (ref 3.5–5.1)
PROT SERPL-MCNC: 7 G/DL (ref 6.4–8.2)
RBC # BLD AUTO: 3.28 M/UL (ref 3.8–5.2)
SARS-COV-2 RDRP RESP QL NAA+PROBE: NOT DETECTED
SERVICE CMNT-IMP: ABNORMAL
SERVICE CMNT-IMP: ABNORMAL
SERVICE CMNT-IMP: NORMAL
SERVICE CMNT-IMP: NORMAL
SODIUM SERPL-SCNC: 140 MMOL/L (ref 136–145)
SOURCE: NORMAL
SPECIMEN HOLD: NORMAL
WBC # BLD AUTO: 6.1 K/UL (ref 3.6–11)

## 2023-09-06 PROCEDURE — 85027 COMPLETE CBC AUTOMATED: CPT

## 2023-09-06 PROCEDURE — 6370000000 HC RX 637 (ALT 250 FOR IP): Performed by: NURSE PRACTITIONER

## 2023-09-06 PROCEDURE — 6360000002 HC RX W HCPCS

## 2023-09-06 PROCEDURE — 86850 RBC ANTIBODY SCREEN: CPT

## 2023-09-06 PROCEDURE — 86901 BLOOD TYPING SEROLOGIC RH(D): CPT

## 2023-09-06 PROCEDURE — 82962 GLUCOSE BLOOD TEST: CPT

## 2023-09-06 PROCEDURE — 2580000003 HC RX 258: Performed by: STUDENT IN AN ORGANIZED HEALTH CARE EDUCATION/TRAINING PROGRAM

## 2023-09-06 PROCEDURE — 86900 BLOOD TYPING SEROLOGIC ABO: CPT

## 2023-09-06 PROCEDURE — 80053 COMPREHEN METABOLIC PANEL: CPT

## 2023-09-06 PROCEDURE — 76942 ECHO GUIDE FOR BIOPSY: CPT

## 2023-09-06 PROCEDURE — 6370000000 HC RX 637 (ALT 250 FOR IP): Performed by: STUDENT IN AN ORGANIZED HEALTH CARE EDUCATION/TRAINING PROGRAM

## 2023-09-06 PROCEDURE — 90945 DIALYSIS ONE EVALUATION: CPT

## 2023-09-06 PROCEDURE — 2060000000 HC ICU INTERMEDIATE R&B

## 2023-09-06 PROCEDURE — 36415 COLL VENOUS BLD VENIPUNCTURE: CPT

## 2023-09-06 PROCEDURE — 02H633Z INSERTION OF INFUSION DEVICE INTO RIGHT ATRIUM, PERCUTANEOUS APPROACH: ICD-10-PCS | Performed by: INTERNAL MEDICINE

## 2023-09-06 PROCEDURE — 87635 SARS-COV-2 COVID-19 AMP PRB: CPT

## 2023-09-06 PROCEDURE — 6370000000 HC RX 637 (ALT 250 FOR IP): Performed by: HOSPITALIST

## 2023-09-06 PROCEDURE — 6360000002 HC RX W HCPCS: Performed by: INTERNAL MEDICINE

## 2023-09-06 PROCEDURE — 94760 N-INVAS EAR/PLS OXIMETRY 1: CPT

## 2023-09-06 PROCEDURE — 86923 COMPATIBILITY TEST ELECTRIC: CPT

## 2023-09-06 PROCEDURE — 6370000000 HC RX 637 (ALT 250 FOR IP): Performed by: INTERNAL MEDICINE

## 2023-09-06 PROCEDURE — 99233 SBSQ HOSP IP/OBS HIGH 50: CPT

## 2023-09-06 PROCEDURE — APPSS45 APP SPLIT SHARED TIME 31-45 MINUTES: Performed by: NURSE PRACTITIONER

## 2023-09-06 PROCEDURE — 2580000003 HC RX 258: Performed by: INTERNAL MEDICINE

## 2023-09-06 PROCEDURE — 2500000003 HC RX 250 WO HCPCS

## 2023-09-06 RX ORDER — INSULIN GLARGINE 100 [IU]/ML
20 INJECTION, SOLUTION SUBCUTANEOUS NIGHTLY
Status: DISCONTINUED | OUTPATIENT
Start: 2023-09-06 | End: 2023-09-07

## 2023-09-06 RX ORDER — INSULIN GLARGINE 100 [IU]/ML
0.3 INJECTION, SOLUTION SUBCUTANEOUS NIGHTLY
Status: DISCONTINUED | OUTPATIENT
Start: 2023-09-06 | End: 2023-09-06

## 2023-09-06 RX ORDER — CHLORHEXIDINE GLUCONATE 0.12 MG/ML
15 RINSE ORAL 2 TIMES DAILY
Status: DISCONTINUED | OUTPATIENT
Start: 2023-09-06 | End: 2023-09-07

## 2023-09-06 RX ORDER — HEPARIN SODIUM 1000 [USP'U]/ML
INJECTION, SOLUTION INTRAVENOUS; SUBCUTANEOUS PRN
Status: DISCONTINUED | OUTPATIENT
Start: 2023-09-06 | End: 2023-09-07 | Stop reason: HOSPADM

## 2023-09-06 RX ORDER — LIDOCAINE HYDROCHLORIDE 10 MG/ML
INJECTION, SOLUTION INFILTRATION; PERINEURAL PRN
Status: DISCONTINUED | OUTPATIENT
Start: 2023-09-06 | End: 2023-09-07 | Stop reason: HOSPADM

## 2023-09-06 RX ADMIN — PANTOPRAZOLE SODIUM 40 MG: 40 TABLET, DELAYED RELEASE ORAL at 15:55

## 2023-09-06 RX ADMIN — DULOXETINE 20 MG: 20 CAPSULE, DELAYED RELEASE ORAL at 10:10

## 2023-09-06 RX ADMIN — MUPIROCIN: 20 OINTMENT TOPICAL at 21:18

## 2023-09-06 RX ADMIN — CHLORHEXIDINE GLUCONATE 15 ML: 1.2 RINSE ORAL at 10:20

## 2023-09-06 RX ADMIN — GENTAMICIN SULFATE: 1 CREAM TOPICAL at 16:02

## 2023-09-06 RX ADMIN — OXYCODONE HYDROCHLORIDE 5 MG: 5 TABLET ORAL at 00:05

## 2023-09-06 RX ADMIN — OXYCODONE HYDROCHLORIDE 5 MG: 5 TABLET ORAL at 15:53

## 2023-09-06 RX ADMIN — Medication 2 UNITS: at 07:04

## 2023-09-06 RX ADMIN — CHLORHEXIDINE GLUCONATE 15 ML: 1.2 RINSE ORAL at 21:19

## 2023-09-06 RX ADMIN — SODIUM CHLORIDE, SODIUM LACTATE, CALCIUM CHLORIDE, MAGNESIUM CHLORIDE AND DEXTROSE 6000 ML: 2.5; 538; 448; 18.3; 5.08 INJECTION, SOLUTION INTRAPERITONEAL at 16:02

## 2023-09-06 RX ADMIN — HEPARIN SODIUM 2600 UNITS: 1000 INJECTION, SOLUTION INTRAVENOUS; SUBCUTANEOUS at 14:23

## 2023-09-06 RX ADMIN — DULOXETINE 20 MG: 20 CAPSULE, DELAYED RELEASE ORAL at 21:17

## 2023-09-06 RX ADMIN — OXYCODONE HYDROCHLORIDE 5 MG: 5 TABLET ORAL at 06:06

## 2023-09-06 RX ADMIN — Medication 1 MG: at 10:09

## 2023-09-06 RX ADMIN — SODIUM CHLORIDE, SODIUM LACTATE, CALCIUM CHLORIDE, MAGNESIUM CHLORIDE AND DEXTROSE 6000 ML: 2.5; 538; 448; 18.3; 5.08 INJECTION, SOLUTION INTRAPERITONEAL at 16:03

## 2023-09-06 RX ADMIN — MUPIROCIN: 20 OINTMENT TOPICAL at 10:20

## 2023-09-06 RX ADMIN — EPOETIN ALFA-EPBX 20000 UNITS: 20000 INJECTION, SOLUTION INTRAVENOUS; SUBCUTANEOUS at 10:10

## 2023-09-06 RX ADMIN — INSULIN GLARGINE 20 UNITS: 100 INJECTION, SOLUTION SUBCUTANEOUS at 21:17

## 2023-09-06 RX ADMIN — Medication 10 ML: at 10:29

## 2023-09-06 RX ADMIN — ROSUVASTATIN 40 MG: 40 TABLET, FILM COATED ORAL at 10:10

## 2023-09-06 RX ADMIN — LIDOCAINE HYDROCHLORIDE 5 ML: 10 INJECTION, SOLUTION INFILTRATION; PERINEURAL at 14:23

## 2023-09-06 RX ADMIN — AMLODIPINE BESYLATE 10 MG: 5 TABLET ORAL at 10:10

## 2023-09-06 RX ADMIN — METRONIDAZOLE 500 MG: 250 TABLET ORAL at 10:10

## 2023-09-06 RX ADMIN — CARVEDILOL 12.5 MG: 12.5 TABLET, FILM COATED ORAL at 17:03

## 2023-09-06 RX ADMIN — PANTOPRAZOLE SODIUM 40 MG: 40 TABLET, DELAYED RELEASE ORAL at 06:06

## 2023-09-06 RX ADMIN — Medication 10 ML: at 21:18

## 2023-09-06 RX ADMIN — MONTELUKAST 10 MG: 10 TABLET, FILM COATED ORAL at 21:17

## 2023-09-06 RX ADMIN — CARVEDILOL 12.5 MG: 12.5 TABLET, FILM COATED ORAL at 10:27

## 2023-09-06 RX ADMIN — CLARITHROMYCIN 250 MG: 500 TABLET, FILM COATED ORAL at 10:27

## 2023-09-06 RX ADMIN — BUMETANIDE 2 MG: 1 TABLET ORAL at 10:10

## 2023-09-06 ASSESSMENT — PAIN DESCRIPTION - ONSET
ONSET: ON-GOING

## 2023-09-06 ASSESSMENT — PAIN SCALES - GENERAL
PAINLEVEL_OUTOF10: 2
PAINLEVEL_OUTOF10: 0
PAINLEVEL_OUTOF10: 7
PAINLEVEL_OUTOF10: 2
PAINLEVEL_OUTOF10: 8
PAINLEVEL_OUTOF10: 2
PAINLEVEL_OUTOF10: 0
PAINLEVEL_OUTOF10: 7
PAINLEVEL_OUTOF10: 2
PAINLEVEL_OUTOF10: 9
PAINLEVEL_OUTOF10: 3
PAINLEVEL_OUTOF10: 2
PAINLEVEL_OUTOF10: 7

## 2023-09-06 ASSESSMENT — PAIN SCALES - WONG BAKER
WONGBAKER_NUMERICALRESPONSE: 0

## 2023-09-06 ASSESSMENT — PAIN DESCRIPTION - FREQUENCY
FREQUENCY: CONTINUOUS

## 2023-09-06 ASSESSMENT — PAIN DESCRIPTION - DESCRIPTORS
DESCRIPTORS: ACHING

## 2023-09-06 ASSESSMENT — PAIN DESCRIPTION - LOCATION
LOCATION: BACK

## 2023-09-06 ASSESSMENT — PAIN DESCRIPTION - ORIENTATION
ORIENTATION: MID
ORIENTATION: MID
ORIENTATION: LOWER

## 2023-09-06 ASSESSMENT — PAIN DESCRIPTION - PAIN TYPE
TYPE: CHRONIC PAIN

## 2023-09-06 ASSESSMENT — PAIN - FUNCTIONAL ASSESSMENT
PAIN_FUNCTIONAL_ASSESSMENT: ACTIVITIES ARE NOT PREVENTED

## 2023-09-06 NOTE — FLOWSHEET NOTE
Peritoneal Dialysis Disconnection / [phone]     Primary RN SBAR: RUBENS Diaz, RN  Patient Education: access/site care           Hepatitis B Surface Ag   Date/Time Value Ref Range Status   08/25/2023 12:35 AM <0.10 Index Final            Hep B S Ab   Date/Time Value Ref Range Status   08/20/2023 10:01 AM <3.10 mIU/mL Final      09/06/23 0341   Peritoneal Dialysis Catheter Mid lower abdomen   No placement date or time found. Catheter Location: Mid lower abdomen   Status Deaccessed   Site Condition   (BRIAN - drsg C/D/I)   Dressing Status Clean, dry & intact   Dressing Gauze   Date of Last Dressing Change 09/05/23   Dialysis Type Continuous cycling   Catheter Care Given Yes   Post-Treatment (Cycler)   Average Dwell Time (Hours:Minutes) 1:23   Lost Dwell Time (Hours:Minutes) 0:07   Effluent Appearance Clear;Yellow   PD Output (mL) 710 mL  (ID 47 ml +  ml = 710)     Lockwoodida Dickens RN at bedside to disconnect pt from CCPD tx. Orders, consent, pt, and code status confirmed. Tx completed. Used cassette and bags discarded. Education and pre/post report given to primary RN. Net Fluid Loss: 710 ml     Primary RN SBAR: RUBENS Concepcion

## 2023-09-06 NOTE — CONSENT
Informed Consent for Blood Component Transfusion Note    I have discussed with the patient the rationale for blood component transfusion; its benefits in treating or preventing fatigue, organ damage, or death; and its risk which includes mild transfusion reactions, rare risk of blood borne infection, or more serious but rare reactions. I have discussed the alternatives to transfusion, including the risk and consequences of not receiving transfusion. The patient had an opportunity to ask questions and had agreed to proceed with transfusion of blood components.     Electronically signed by DAYA Solorio NP on 9/6/23 at 12:33 PM EDT

## 2023-09-07 ENCOUNTER — APPOINTMENT (OUTPATIENT)
Facility: HOSPITAL | Age: 68
DRG: 853 | End: 2023-09-07
Payer: MEDICARE

## 2023-09-07 ENCOUNTER — ANESTHESIA (OUTPATIENT)
Facility: HOSPITAL | Age: 68
End: 2023-09-07
Payer: MEDICARE

## 2023-09-07 ENCOUNTER — APPOINTMENT (OUTPATIENT)
Facility: HOSPITAL | Age: 68
DRG: 853 | End: 2023-09-07
Attending: THORACIC SURGERY (CARDIOTHORACIC VASCULAR SURGERY)
Payer: MEDICARE

## 2023-09-07 PROBLEM — Z95.1 S/P CABG X 3: Status: ACTIVE | Noted: 2023-09-07

## 2023-09-07 LAB
ABO + RH BLD: NORMAL
ACUTE KIDNEY INJURY RISK NEPHROCHECK: 6.67 (ref 0–0.3)
ALBUMIN SERPL-MCNC: 2.2 G/DL (ref 3.5–5)
ALBUMIN SERPL-MCNC: 2.5 G/DL (ref 3.5–5)
ALBUMIN/GLOB SERPL: 0.7 (ref 1.1–2.2)
ALBUMIN/GLOB SERPL: 0.7 (ref 1.1–2.2)
ALP SERPL-CCNC: 102 U/L (ref 45–117)
ALP SERPL-CCNC: 86 U/L (ref 45–117)
ALT SERPL-CCNC: 7 U/L (ref 12–78)
ALT SERPL-CCNC: 9 U/L (ref 12–78)
ANION GAP BLD CALC-SCNC: 9 MMOL/L (ref 10–20)
ANION GAP SERPL CALC-SCNC: 8 MMOL/L (ref 5–15)
ANION GAP SERPL CALC-SCNC: 8 MMOL/L (ref 5–15)
APTT PPP: 45.6 SEC (ref 22.1–31)
AST SERPL-CCNC: 12 U/L (ref 15–37)
AST SERPL-CCNC: 16 U/L (ref 15–37)
BASE DEFICIT BLD-SCNC: 1.8 MMOL/L
BASE EXCESS BLD CALC-SCNC: 2.4 MMOL/L
BILIRUB SERPL-MCNC: 0.4 MG/DL (ref 0.2–1)
BILIRUB SERPL-MCNC: 0.5 MG/DL (ref 0.2–1)
BLD PROD TYP BPU: NORMAL
BLD PROD TYP BPU: NORMAL
BLOOD BANK DISPENSE STATUS: NORMAL
BLOOD BANK DISPENSE STATUS: NORMAL
BLOOD GROUP ANTIBODIES SERPL: NORMAL
BPU ID: NORMAL
BPU ID: NORMAL
BUN SERPL-MCNC: 16 MG/DL (ref 6–20)
BUN SERPL-MCNC: 16 MG/DL (ref 6–20)
BUN/CREAT SERPL: 2 (ref 12–20)
BUN/CREAT SERPL: 3 (ref 12–20)
CA-I BLD-MCNC: 1.11 MMOL/L (ref 1.12–1.32)
CA-I BLD-SCNC: 1.08 MMOL/L (ref 1.12–1.32)
CA-I BLD-SCNC: 1.1 MMOL/L (ref 1.12–1.32)
CALCIUM SERPL-MCNC: 8 MG/DL (ref 8.5–10.1)
CALCIUM SERPL-MCNC: 8.2 MG/DL (ref 8.5–10.1)
CHLORIDE BLD-SCNC: 101 MMOL/L (ref 98–107)
CHLORIDE SERPL-SCNC: 105 MMOL/L (ref 97–108)
CHLORIDE SERPL-SCNC: 106 MMOL/L (ref 97–108)
CO2 BLD-SCNC: 32.2 MMOL/L (ref 21–32)
CO2 SERPL-SCNC: 26 MMOL/L (ref 21–32)
CO2 SERPL-SCNC: 26 MMOL/L (ref 21–32)
CREAT BLD-MCNC: 6.96 MG/DL (ref 0.6–1.3)
CREAT SERPL-MCNC: 6.2 MG/DL (ref 0.55–1.02)
CREAT SERPL-MCNC: 6.48 MG/DL (ref 0.55–1.02)
CROSSMATCH RESULT: NORMAL
CROSSMATCH RESULT: NORMAL
ECHO LV EDV 3D: 102 ML
ECHO LV EJECTION FRACTION 3D: 33 %
ECHO LV ESV 3D: 68 ML
ECHO MV MAX VELOCITY: 1.2 M/S
ECHO MV MEAN GRADIENT: 2 MMHG
ECHO MV MEAN VELOCITY: 0.7 M/S
ECHO MV PEAK GRADIENT: 6 MMHG
ECHO MV VTI: 37.2 CM
EKG ATRIAL RATE: 87 BPM
EKG DIAGNOSIS: NORMAL
EKG P AXIS: 64 DEGREES
EKG P-R INTERVAL: 180 MS
EKG Q-T INTERVAL: 418 MS
EKG QRS DURATION: 92 MS
EKG QTC CALCULATION (BAZETT): 502 MS
EKG R AXIS: -22 DEGREES
EKG T AXIS: 53 DEGREES
EKG VENTRICULAR RATE: 87 BPM
ERYTHROCYTE [DISTWIDTH] IN BLOOD BY AUTOMATED COUNT: 19.1 % (ref 11.5–14.5)
ERYTHROCYTE [DISTWIDTH] IN BLOOD BY AUTOMATED COUNT: 19.8 % (ref 11.5–14.5)
GLOBULIN SER CALC-MCNC: 3.1 G/DL (ref 2–4)
GLOBULIN SER CALC-MCNC: 3.8 G/DL (ref 2–4)
GLUCOSE BLD STRIP.AUTO-MCNC: 138 MG/DL (ref 65–117)
GLUCOSE BLD STRIP.AUTO-MCNC: 145 MG/DL (ref 65–117)
GLUCOSE BLD STRIP.AUTO-MCNC: 146 MG/DL (ref 65–117)
GLUCOSE BLD STRIP.AUTO-MCNC: 159 MG/DL (ref 65–117)
GLUCOSE BLD STRIP.AUTO-MCNC: 164 MG/DL (ref 65–117)
GLUCOSE BLD STRIP.AUTO-MCNC: 165 MG/DL (ref 65–117)
GLUCOSE BLD STRIP.AUTO-MCNC: 166 MG/DL (ref 65–117)
GLUCOSE BLD STRIP.AUTO-MCNC: 172 MG/DL (ref 65–117)
GLUCOSE BLD STRIP.AUTO-MCNC: 172 MG/DL (ref 65–117)
GLUCOSE BLD STRIP.AUTO-MCNC: 173 MG/DL (ref 65–117)
GLUCOSE BLD STRIP.AUTO-MCNC: 174 MG/DL (ref 65–117)
GLUCOSE BLD STRIP.AUTO-MCNC: 176 MG/DL (ref 65–117)
GLUCOSE BLD-MCNC: 97 MG/DL (ref 65–100)
GLUCOSE SERPL-MCNC: 146 MG/DL (ref 65–100)
GLUCOSE SERPL-MCNC: 178 MG/DL (ref 65–100)
HCG UR QL: NEGATIVE
HCO3 BLD-SCNC: 22.8 MMOL/L (ref 22–26)
HCO3 BLD-SCNC: 25.2 MMOL/L (ref 22–26)
HCT VFR BLD AUTO: 26 % (ref 35–47)
HCT VFR BLD AUTO: 30.2 % (ref 35–47)
HGB BLD-MCNC: 8.1 G/DL (ref 11.5–16)
HGB BLD-MCNC: 9.4 G/DL (ref 11.5–16)
INR PPP: 1.5 (ref 0.9–1.1)
MAGNESIUM SERPL-MCNC: 2.2 MG/DL (ref 1.6–2.4)
MCH RBC QN AUTO: 29.6 PG (ref 26–34)
MCH RBC QN AUTO: 30.2 PG (ref 26–34)
MCHC RBC AUTO-ENTMCNC: 31.1 G/DL (ref 30–36.5)
MCHC RBC AUTO-ENTMCNC: 31.2 G/DL (ref 30–36.5)
MCV RBC AUTO: 94.9 FL (ref 80–99)
MCV RBC AUTO: 97.1 FL (ref 80–99)
NRBC # BLD: 0 K/UL (ref 0–0.01)
NRBC # BLD: 0 K/UL (ref 0–0.01)
NRBC BLD-RTO: 0 PER 100 WBC
NRBC BLD-RTO: 0 PER 100 WBC
PCO2 BLD: 31.4 MMHG (ref 35–45)
PCO2 BLD: 37.1 MMHG (ref 35–45)
PH BLD: 7.4 (ref 7.35–7.45)
PH BLD: 7.51 (ref 7.35–7.45)
PLATELET # BLD AUTO: 188 K/UL (ref 150–400)
PLATELET # BLD AUTO: 210 K/UL (ref 150–400)
PMV BLD AUTO: 8.9 FL (ref 8.9–12.9)
PMV BLD AUTO: 9.7 FL (ref 8.9–12.9)
PO2 BLD: 137 MMHG (ref 80–100)
PO2 BLD: 152 MMHG (ref 80–100)
POTASSIUM BLD-SCNC: 3.7 MMOL/L (ref 3.5–5.1)
POTASSIUM SERPL-SCNC: 3.6 MMOL/L (ref 3.5–5.1)
POTASSIUM SERPL-SCNC: 3.8 MMOL/L (ref 3.5–5.1)
PROT SERPL-MCNC: 5.3 G/DL (ref 6.4–8.2)
PROT SERPL-MCNC: 6.3 G/DL (ref 6.4–8.2)
PROTHROMBIN TIME: 14.9 SEC (ref 9–11.1)
RBC # BLD AUTO: 2.74 M/UL (ref 3.8–5.2)
RBC # BLD AUTO: 3.11 M/UL (ref 3.8–5.2)
SAO2 % BLD: 99.1 % (ref 92–97)
SAO2 % BLD: 99.5 % (ref 92–97)
SERVICE CMNT-IMP: ABNORMAL
SODIUM BLD-SCNC: 141 MMOL/L (ref 136–145)
SODIUM SERPL-SCNC: 139 MMOL/L (ref 136–145)
SODIUM SERPL-SCNC: 140 MMOL/L (ref 136–145)
SPECIMEN EXP DATE BLD: NORMAL
SPECIMEN TYPE: ABNORMAL
SPECIMEN TYPE: ABNORMAL
THERAPEUTIC RANGE: ABNORMAL SECS (ref 58–77)
UNIT DIVISION: 0
UNIT DIVISION: 0
WBC # BLD AUTO: 6.1 K/UL (ref 3.6–11)
WBC # BLD AUTO: 7.5 K/UL (ref 3.6–11)

## 2023-09-07 PROCEDURE — 2010000000 HC RM ICU SURGICAL

## 2023-09-07 PROCEDURE — 06BQ4ZZ EXCISION OF LEFT SAPHENOUS VEIN, PERCUTANEOUS ENDOSCOPIC APPROACH: ICD-10-PCS | Performed by: THORACIC SURGERY (CARDIOTHORACIC VASCULAR SURGERY)

## 2023-09-07 PROCEDURE — 2000000000 HC ICU R&B

## 2023-09-07 PROCEDURE — C1713 ANCHOR/SCREW BN/BN,TIS/BN: HCPCS | Performed by: THORACIC SURGERY (CARDIOTHORACIC VASCULAR SURGERY)

## 2023-09-07 PROCEDURE — C9399 UNCLASSIFIED DRUGS OR BIOLOG: HCPCS | Performed by: ANESTHESIOLOGY

## 2023-09-07 PROCEDURE — 2580000003 HC RX 258: Performed by: ANESTHESIOLOGY

## 2023-09-07 PROCEDURE — 94002 VENT MGMT INPAT INIT DAY: CPT

## 2023-09-07 PROCEDURE — 85027 COMPLETE CBC AUTOMATED: CPT

## 2023-09-07 PROCEDURE — 85610 PROTHROMBIN TIME: CPT

## 2023-09-07 PROCEDURE — 6360000002 HC RX W HCPCS

## 2023-09-07 PROCEDURE — 6370000000 HC RX 637 (ALT 250 FOR IP): Performed by: STUDENT IN AN ORGANIZED HEALTH CARE EDUCATION/TRAINING PROGRAM

## 2023-09-07 PROCEDURE — 90945 DIALYSIS ONE EVALUATION: CPT

## 2023-09-07 PROCEDURE — B246ZZ4 ULTRASONOGRAPHY OF RIGHT AND LEFT HEART, TRANSESOPHAGEAL: ICD-10-PCS | Performed by: ANESTHESIOLOGY

## 2023-09-07 PROCEDURE — 80053 COMPREHEN METABOLIC PANEL: CPT

## 2023-09-07 PROCEDURE — 2709999900 HC NON-CHARGEABLE SUPPLY: Performed by: THORACIC SURGERY (CARDIOTHORACIC VASCULAR SURGERY)

## 2023-09-07 PROCEDURE — 6370000000 HC RX 637 (ALT 250 FOR IP)

## 2023-09-07 PROCEDURE — 83735 ASSAY OF MAGNESIUM: CPT

## 2023-09-07 PROCEDURE — 2500000003 HC RX 250 WO HCPCS

## 2023-09-07 PROCEDURE — C1729 CATH, DRAINAGE: HCPCS | Performed by: THORACIC SURGERY (CARDIOTHORACIC VASCULAR SURGERY)

## 2023-09-07 PROCEDURE — 6360000002 HC RX W HCPCS: Performed by: ANESTHESIOLOGY

## 2023-09-07 PROCEDURE — 6360000002 HC RX W HCPCS: Performed by: THORACIC SURGERY (CARDIOTHORACIC VASCULAR SURGERY)

## 2023-09-07 PROCEDURE — A4216 STERILE WATER/SALINE, 10 ML: HCPCS

## 2023-09-07 PROCEDURE — C9113 INJ PANTOPRAZOLE SODIUM, VIA: HCPCS

## 2023-09-07 PROCEDURE — 82803 BLOOD GASES ANY COMBINATION: CPT

## 2023-09-07 PROCEDURE — 02100Z9 BYPASS CORONARY ARTERY, ONE ARTERY FROM LEFT INTERNAL MAMMARY, OPEN APPROACH: ICD-10-PCS | Performed by: THORACIC SURGERY (CARDIOTHORACIC VASCULAR SURGERY)

## 2023-09-07 PROCEDURE — 85730 THROMBOPLASTIN TIME PARTIAL: CPT

## 2023-09-07 PROCEDURE — 2580000003 HC RX 258

## 2023-09-07 PROCEDURE — 021109W BYPASS CORONARY ARTERY, TWO ARTERIES FROM AORTA WITH AUTOLOGOUS VENOUS TISSUE, OPEN APPROACH: ICD-10-PCS | Performed by: THORACIC SURGERY (CARDIOTHORACIC VASCULAR SURGERY)

## 2023-09-07 PROCEDURE — 82962 GLUCOSE BLOOD TEST: CPT

## 2023-09-07 PROCEDURE — P9045 ALBUMIN (HUMAN), 5%, 250 ML: HCPCS

## 2023-09-07 PROCEDURE — 81025 URINE PREGNANCY TEST: CPT

## 2023-09-07 PROCEDURE — 2500000003 HC RX 250 WO HCPCS: Performed by: ANESTHESIOLOGY

## 2023-09-07 PROCEDURE — 3700000000 HC ANESTHESIA ATTENDED CARE: Performed by: THORACIC SURGERY (CARDIOTHORACIC VASCULAR SURGERY)

## 2023-09-07 PROCEDURE — P9047 ALBUMIN (HUMAN), 25%, 50ML: HCPCS | Performed by: ANESTHESIOLOGY

## 2023-09-07 PROCEDURE — 93010 ELECTROCARDIOGRAM REPORT: CPT | Performed by: SPECIALIST

## 2023-09-07 PROCEDURE — 80047 BASIC METABLC PNL IONIZED CA: CPT

## 2023-09-07 PROCEDURE — 36415 COLL VENOUS BLD VENIPUNCTURE: CPT

## 2023-09-07 PROCEDURE — 3600000008 HC SURGERY OHS BASE: Performed by: THORACIC SURGERY (CARDIOTHORACIC VASCULAR SURGERY)

## 2023-09-07 PROCEDURE — 71045 X-RAY EXAM CHEST 1 VIEW: CPT

## 2023-09-07 PROCEDURE — 3600000018 HC SURGERY OHS ADDTL 15MIN: Performed by: THORACIC SURGERY (CARDIOTHORACIC VASCULAR SURGERY)

## 2023-09-07 PROCEDURE — 3700000001 HC ADD 15 MINUTES (ANESTHESIA): Performed by: THORACIC SURGERY (CARDIOTHORACIC VASCULAR SURGERY)

## 2023-09-07 PROCEDURE — 2720000010 HC SURG SUPPLY STERILE: Performed by: THORACIC SURGERY (CARDIOTHORACIC VASCULAR SURGERY)

## 2023-09-07 RX ORDER — HYDRALAZINE HYDROCHLORIDE 20 MG/ML
10 INJECTION INTRAMUSCULAR; INTRAVENOUS
Status: DISCONTINUED | OUTPATIENT
Start: 2023-09-07 | End: 2023-09-07 | Stop reason: SDUPTHER

## 2023-09-07 RX ORDER — DIPHENHYDRAMINE HCL 25 MG
25 CAPSULE ORAL NIGHTLY PRN
Status: DISCONTINUED | OUTPATIENT
Start: 2023-09-08 | End: 2023-09-14 | Stop reason: HOSPADM

## 2023-09-07 RX ORDER — CEFAZOLIN SODIUM 1 G/3ML
INJECTION, POWDER, FOR SOLUTION INTRAMUSCULAR; INTRAVENOUS PRN
Status: DISCONTINUED | OUTPATIENT
Start: 2023-09-07 | End: 2023-09-07 | Stop reason: SDUPTHER

## 2023-09-07 RX ORDER — ALBUMIN, HUMAN INJ 5% 5 %
12.5 SOLUTION INTRAVENOUS
Status: COMPLETED | OUTPATIENT
Start: 2023-09-07 | End: 2023-09-08

## 2023-09-07 RX ORDER — HEPARIN SOD,PORCINE/0.9 % NACL 30K/1000ML
INTRAVENOUS SOLUTION INTRAVENOUS PRN
Status: DISCONTINUED | OUTPATIENT
Start: 2023-09-07 | End: 2023-09-07 | Stop reason: ALTCHOICE

## 2023-09-07 RX ORDER — HYDROMORPHONE HYDROCHLORIDE 1 MG/ML
0.5 INJECTION, SOLUTION INTRAMUSCULAR; INTRAVENOUS; SUBCUTANEOUS EVERY 5 MIN PRN
Status: DISCONTINUED | OUTPATIENT
Start: 2023-09-07 | End: 2023-09-07 | Stop reason: SDUPTHER

## 2023-09-07 RX ORDER — ROCURONIUM BROMIDE 10 MG/ML
INJECTION, SOLUTION INTRAVENOUS PRN
Status: DISCONTINUED | OUTPATIENT
Start: 2023-09-07 | End: 2023-09-07 | Stop reason: SDUPTHER

## 2023-09-07 RX ORDER — HYDROMORPHONE HYDROCHLORIDE 1 MG/ML
0.5 INJECTION, SOLUTION INTRAMUSCULAR; INTRAVENOUS; SUBCUTANEOUS EVERY 4 HOURS PRN
Status: DISCONTINUED | OUTPATIENT
Start: 2023-09-07 | End: 2023-09-14 | Stop reason: HOSPADM

## 2023-09-07 RX ORDER — FENTANYL CITRATE 50 UG/ML
100 INJECTION, SOLUTION INTRAMUSCULAR; INTRAVENOUS
Status: COMPLETED | OUTPATIENT
Start: 2023-09-07 | End: 2023-09-07

## 2023-09-07 RX ORDER — PANTOPRAZOLE SODIUM 40 MG/1
40 TABLET, DELAYED RELEASE ORAL DAILY
Status: DISCONTINUED | OUTPATIENT
Start: 2023-09-07 | End: 2023-09-14 | Stop reason: HOSPADM

## 2023-09-07 RX ORDER — LANOLIN ALCOHOL/MO/W.PET/CERES
400 CREAM (GRAM) TOPICAL 2 TIMES DAILY
Status: DISCONTINUED | OUTPATIENT
Start: 2023-09-08 | End: 2023-09-14 | Stop reason: HOSPADM

## 2023-09-07 RX ORDER — ONDANSETRON 2 MG/ML
4 INJECTION INTRAMUSCULAR; INTRAVENOUS EVERY 4 HOURS PRN
Status: DISCONTINUED | OUTPATIENT
Start: 2023-09-07 | End: 2023-09-14 | Stop reason: HOSPADM

## 2023-09-07 RX ORDER — SODIUM CHLORIDE 0.9 % (FLUSH) 0.9 %
5-40 SYRINGE (ML) INJECTION EVERY 12 HOURS SCHEDULED
Status: DISCONTINUED | OUTPATIENT
Start: 2023-09-07 | End: 2023-09-10

## 2023-09-07 RX ORDER — DEXMEDETOMIDINE HYDROCHLORIDE 4 UG/ML
INJECTION, SOLUTION INTRAVENOUS CONTINUOUS PRN
Status: DISCONTINUED | OUTPATIENT
Start: 2023-09-07 | End: 2023-09-07 | Stop reason: SDUPTHER

## 2023-09-07 RX ORDER — POLYETHYLENE GLYCOL 3350 17 G/17G
17 POWDER, FOR SOLUTION ORAL DAILY
Status: DISCONTINUED | OUTPATIENT
Start: 2023-09-07 | End: 2023-09-14 | Stop reason: HOSPADM

## 2023-09-07 RX ORDER — ACETAMINOPHEN 325 MG/1
975 TABLET ORAL EVERY 6 HOURS
Status: DISCONTINUED | OUTPATIENT
Start: 2023-09-07 | End: 2023-09-14 | Stop reason: HOSPADM

## 2023-09-07 RX ORDER — MIDAZOLAM HYDROCHLORIDE 2 MG/2ML
2 INJECTION, SOLUTION INTRAMUSCULAR; INTRAVENOUS
Status: COMPLETED | OUTPATIENT
Start: 2023-09-07 | End: 2023-09-07

## 2023-09-07 RX ORDER — MIDAZOLAM HYDROCHLORIDE 2 MG/2ML
1 INJECTION, SOLUTION INTRAMUSCULAR; INTRAVENOUS
Status: DISCONTINUED | OUTPATIENT
Start: 2023-09-07 | End: 2023-09-10

## 2023-09-07 RX ORDER — DEXMEDETOMIDINE HYDROCHLORIDE 4 UG/ML
.1-1.5 INJECTION, SOLUTION INTRAVENOUS CONTINUOUS
Status: DISCONTINUED | OUTPATIENT
Start: 2023-09-07 | End: 2023-09-10

## 2023-09-07 RX ORDER — ALBUTEROL SULFATE 2.5 MG/3ML
2.5 SOLUTION RESPIRATORY (INHALATION) EVERY 4 HOURS PRN
Status: DISCONTINUED | OUTPATIENT
Start: 2023-09-07 | End: 2023-09-14 | Stop reason: HOSPADM

## 2023-09-07 RX ORDER — SODIUM CHLORIDE 9 MG/ML
INJECTION, SOLUTION INTRAVENOUS PRN
Status: DISCONTINUED | OUTPATIENT
Start: 2023-09-07 | End: 2023-09-07 | Stop reason: HOSPADM

## 2023-09-07 RX ORDER — SODIUM CHLORIDE 0.9 % (FLUSH) 0.9 %
5-40 SYRINGE (ML) INJECTION PRN
Status: DISCONTINUED | OUTPATIENT
Start: 2023-09-07 | End: 2023-09-07 | Stop reason: SDUPTHER

## 2023-09-07 RX ORDER — PROCHLORPERAZINE EDISYLATE 5 MG/ML
5 INJECTION INTRAMUSCULAR; INTRAVENOUS
Status: DISCONTINUED | OUTPATIENT
Start: 2023-09-07 | End: 2023-09-07 | Stop reason: SDUPTHER

## 2023-09-07 RX ORDER — ATORVASTATIN CALCIUM 40 MG/1
40 TABLET, FILM COATED ORAL NIGHTLY
Status: DISCONTINUED | OUTPATIENT
Start: 2023-09-07 | End: 2023-09-12

## 2023-09-07 RX ORDER — DEXTROSE MONOHYDRATE 100 MG/ML
INJECTION, SOLUTION INTRAVENOUS CONTINUOUS PRN
Status: DISCONTINUED | OUTPATIENT
Start: 2023-09-07 | End: 2023-09-14 | Stop reason: HOSPADM

## 2023-09-07 RX ORDER — CHLORHEXIDINE GLUCONATE 0.12 MG/ML
15 RINSE ORAL 2 TIMES DAILY
Status: DISCONTINUED | OUTPATIENT
Start: 2023-09-07 | End: 2023-09-14

## 2023-09-07 RX ORDER — SODIUM CHLORIDE 450 MG/100ML
INJECTION, SOLUTION INTRAVENOUS CONTINUOUS
Status: DISCONTINUED | OUTPATIENT
Start: 2023-09-07 | End: 2023-09-11

## 2023-09-07 RX ORDER — HEPARIN SODIUM 1000 [USP'U]/ML
INJECTION, SOLUTION INTRAVENOUS; SUBCUTANEOUS PRN
Status: DISCONTINUED | OUTPATIENT
Start: 2023-09-07 | End: 2023-09-07 | Stop reason: SDUPTHER

## 2023-09-07 RX ORDER — OXYCODONE HYDROCHLORIDE 5 MG/1
5 TABLET ORAL EVERY 4 HOURS PRN
Status: DISCONTINUED | OUTPATIENT
Start: 2023-09-07 | End: 2023-09-14 | Stop reason: HOSPADM

## 2023-09-07 RX ORDER — FENTANYL CITRATE 50 UG/ML
25 INJECTION, SOLUTION INTRAMUSCULAR; INTRAVENOUS EVERY 5 MIN PRN
Status: DISCONTINUED | OUTPATIENT
Start: 2023-09-07 | End: 2023-09-07 | Stop reason: SDUPTHER

## 2023-09-07 RX ORDER — SODIUM CHLORIDE 0.9 % (FLUSH) 0.9 %
5-40 SYRINGE (ML) INJECTION PRN
Status: DISCONTINUED | OUTPATIENT
Start: 2023-09-07 | End: 2023-09-14 | Stop reason: HOSPADM

## 2023-09-07 RX ORDER — POTASSIUM CHLORIDE 29.8 MG/ML
20 INJECTION INTRAVENOUS PRN
Status: DISCONTINUED | OUTPATIENT
Start: 2023-09-07 | End: 2023-09-11

## 2023-09-07 RX ORDER — AMIODARONE HYDROCHLORIDE 200 MG/1
400 TABLET ORAL 2 TIMES DAILY
Status: DISCONTINUED | OUTPATIENT
Start: 2023-09-08 | End: 2023-09-14 | Stop reason: HOSPADM

## 2023-09-07 RX ORDER — SODIUM CHLORIDE 0.9 % (FLUSH) 0.9 %
5-40 SYRINGE (ML) INJECTION EVERY 12 HOURS SCHEDULED
Status: DISCONTINUED | OUTPATIENT
Start: 2023-09-07 | End: 2023-09-14 | Stop reason: HOSPADM

## 2023-09-07 RX ORDER — SENNA AND DOCUSATE SODIUM 50; 8.6 MG/1; MG/1
1 TABLET, FILM COATED ORAL 2 TIMES DAILY
Status: DISCONTINUED | OUTPATIENT
Start: 2023-09-07 | End: 2023-09-14 | Stop reason: HOSPADM

## 2023-09-07 RX ORDER — DESMOPRESSIN ACETATE 4 UG/ML
INJECTION, SOLUTION INTRAVENOUS; SUBCUTANEOUS PRN
Status: DISCONTINUED | OUTPATIENT
Start: 2023-09-07 | End: 2023-09-07 | Stop reason: SDUPTHER

## 2023-09-07 RX ORDER — INSULIN GLARGINE 100 [IU]/ML
1-50 INJECTION, SOLUTION SUBCUTANEOUS
Status: DISPENSED | OUTPATIENT
Start: 2023-09-07 | End: 2023-09-08

## 2023-09-07 RX ORDER — ALBUMIN (HUMAN) 12.5 G/50ML
SOLUTION INTRAVENOUS PRN
Status: DISCONTINUED | OUTPATIENT
Start: 2023-09-07 | End: 2023-09-07 | Stop reason: SDUPTHER

## 2023-09-07 RX ORDER — OXYCODONE HYDROCHLORIDE 5 MG/1
5 TABLET ORAL
Status: DISCONTINUED | OUTPATIENT
Start: 2023-09-07 | End: 2023-09-07 | Stop reason: SDUPTHER

## 2023-09-07 RX ORDER — OXYCODONE HYDROCHLORIDE 5 MG/1
10 TABLET ORAL EVERY 4 HOURS PRN
Status: DISCONTINUED | OUTPATIENT
Start: 2023-09-07 | End: 2023-09-14 | Stop reason: HOSPADM

## 2023-09-07 RX ORDER — SODIUM CHLORIDE, SODIUM LACTATE, POTASSIUM CHLORIDE, CALCIUM CHLORIDE 600; 310; 30; 20 MG/100ML; MG/100ML; MG/100ML; MG/100ML
INJECTION, SOLUTION INTRAVENOUS CONTINUOUS PRN
Status: DISCONTINUED | OUTPATIENT
Start: 2023-09-07 | End: 2023-09-07 | Stop reason: SDUPTHER

## 2023-09-07 RX ORDER — HEPARIN SODIUM 10000 [USP'U]/ML
INJECTION, SOLUTION INTRAVENOUS; SUBCUTANEOUS PRN
Status: DISCONTINUED | OUTPATIENT
Start: 2023-09-07 | End: 2023-09-07 | Stop reason: ALTCHOICE

## 2023-09-07 RX ORDER — SODIUM CHLORIDE 9 MG/ML
INJECTION, SOLUTION INTRAVENOUS CONTINUOUS
Status: DISCONTINUED | OUTPATIENT
Start: 2023-09-07 | End: 2023-09-11

## 2023-09-07 RX ORDER — ONDANSETRON 2 MG/ML
4 INJECTION INTRAMUSCULAR; INTRAVENOUS AS NEEDED
Status: DISCONTINUED | OUTPATIENT
Start: 2023-09-07 | End: 2023-09-07 | Stop reason: HOSPADM

## 2023-09-07 RX ORDER — LIDOCAINE HYDROCHLORIDE 10 MG/ML
1 INJECTION, SOLUTION EPIDURAL; INFILTRATION; INTRACAUDAL; PERINEURAL
Status: DISCONTINUED | OUTPATIENT
Start: 2023-09-07 | End: 2023-09-07 | Stop reason: HOSPADM

## 2023-09-07 RX ORDER — SODIUM CHLORIDE 9 MG/ML
INJECTION, SOLUTION INTRAVENOUS PRN
Status: DISCONTINUED | OUTPATIENT
Start: 2023-09-07 | End: 2023-09-07 | Stop reason: SDUPTHER

## 2023-09-07 RX ORDER — CEFAZOLIN SODIUM 1 G/3ML
INJECTION, POWDER, FOR SOLUTION INTRAMUSCULAR; INTRAVENOUS PRN
Status: DISCONTINUED | OUTPATIENT
Start: 2023-09-07 | End: 2023-09-07 | Stop reason: ALTCHOICE

## 2023-09-07 RX ORDER — LIDOCAINE 4 G/G
2 PATCH TOPICAL DAILY
Status: DISCONTINUED | OUTPATIENT
Start: 2023-09-07 | End: 2023-09-14 | Stop reason: HOSPADM

## 2023-09-07 RX ORDER — DOBUTAMINE HYDROCHLORIDE 200 MG/100ML
0-10 INJECTION INTRAVENOUS CONTINUOUS
Status: DISCONTINUED | OUTPATIENT
Start: 2023-09-07 | End: 2023-09-10

## 2023-09-07 RX ORDER — SODIUM CHLORIDE 0.9 % (FLUSH) 0.9 %
5-40 SYRINGE (ML) INJECTION PRN
Status: DISCONTINUED | OUTPATIENT
Start: 2023-09-07 | End: 2023-09-07 | Stop reason: HOSPADM

## 2023-09-07 RX ORDER — INSULIN LISPRO 100 [IU]/ML
0-6 INJECTION, SOLUTION INTRAVENOUS; SUBCUTANEOUS NIGHTLY
Status: DISCONTINUED | OUTPATIENT
Start: 2023-09-07 | End: 2023-09-08

## 2023-09-07 RX ORDER — ONDANSETRON 2 MG/ML
4 INJECTION INTRAMUSCULAR; INTRAVENOUS
Status: DISCONTINUED | OUTPATIENT
Start: 2023-09-07 | End: 2023-09-07 | Stop reason: SDUPTHER

## 2023-09-07 RX ORDER — INSULIN LISPRO 100 [IU]/ML
0-12 INJECTION, SOLUTION INTRAVENOUS; SUBCUTANEOUS
Status: DISCONTINUED | OUTPATIENT
Start: 2023-09-07 | End: 2023-09-08

## 2023-09-07 RX ORDER — FENTANYL CITRATE 50 UG/ML
INJECTION, SOLUTION INTRAMUSCULAR; INTRAVENOUS PRN
Status: DISCONTINUED | OUTPATIENT
Start: 2023-09-07 | End: 2023-09-07 | Stop reason: SDUPTHER

## 2023-09-07 RX ORDER — ACETAMINOPHEN 500 MG
1000 TABLET ORAL ONCE
Status: DISCONTINUED | OUTPATIENT
Start: 2023-09-07 | End: 2023-09-07 | Stop reason: HOSPADM

## 2023-09-07 RX ORDER — PHENYLEPHRINE HYDROCHLORIDE 10 MG/ML
INJECTION INTRAVENOUS PRN
Status: DISCONTINUED | OUTPATIENT
Start: 2023-09-07 | End: 2023-09-07 | Stop reason: SDUPTHER

## 2023-09-07 RX ORDER — SODIUM CHLORIDE 0.9 % (FLUSH) 0.9 %
5-40 SYRINGE (ML) INJECTION EVERY 12 HOURS SCHEDULED
Status: DISCONTINUED | OUTPATIENT
Start: 2023-09-07 | End: 2023-09-07 | Stop reason: HOSPADM

## 2023-09-07 RX ORDER — PROPOFOL 10 MG/ML
INJECTION, EMULSION INTRAVENOUS PRN
Status: DISCONTINUED | OUTPATIENT
Start: 2023-09-07 | End: 2023-09-07 | Stop reason: SDUPTHER

## 2023-09-07 RX ORDER — DEXAMETHASONE SODIUM PHOSPHATE 4 MG/ML
INJECTION, SOLUTION INTRA-ARTICULAR; INTRALESIONAL; INTRAMUSCULAR; INTRAVENOUS; SOFT TISSUE PRN
Status: DISCONTINUED | OUTPATIENT
Start: 2023-09-07 | End: 2023-09-07 | Stop reason: SDUPTHER

## 2023-09-07 RX ORDER — SODIUM CHLORIDE, SODIUM LACTATE, POTASSIUM CHLORIDE, CALCIUM CHLORIDE 600; 310; 30; 20 MG/100ML; MG/100ML; MG/100ML; MG/100ML
INJECTION, SOLUTION INTRAVENOUS CONTINUOUS
Status: DISCONTINUED | OUTPATIENT
Start: 2023-09-07 | End: 2023-09-07

## 2023-09-07 RX ORDER — HYDROMORPHONE HYDROCHLORIDE 2 MG/ML
INJECTION, SOLUTION INTRAMUSCULAR; INTRAVENOUS; SUBCUTANEOUS PRN
Status: DISCONTINUED | OUTPATIENT
Start: 2023-09-07 | End: 2023-09-07 | Stop reason: SDUPTHER

## 2023-09-07 RX ORDER — HYDRALAZINE HYDROCHLORIDE 20 MG/ML
10 INJECTION INTRAMUSCULAR; INTRAVENOUS EVERY 6 HOURS PRN
Status: DISCONTINUED | OUTPATIENT
Start: 2023-09-07 | End: 2023-09-14 | Stop reason: HOSPADM

## 2023-09-07 RX ORDER — PAPAVERINE HYDROCHLORIDE 30 MG/ML
INJECTION INTRAMUSCULAR; INTRAVENOUS PRN
Status: DISCONTINUED | OUTPATIENT
Start: 2023-09-07 | End: 2023-09-07 | Stop reason: ALTCHOICE

## 2023-09-07 RX ORDER — ROPIVACAINE HYDROCHLORIDE 5 MG/ML
INJECTION, SOLUTION EPIDURAL; INFILTRATION; PERINEURAL PRN
Status: DISCONTINUED | OUTPATIENT
Start: 2023-09-07 | End: 2023-09-07 | Stop reason: ALTCHOICE

## 2023-09-07 RX ORDER — BISACODYL 10 MG
10 SUPPOSITORY, RECTAL RECTAL DAILY PRN
Status: DISCONTINUED | OUTPATIENT
Start: 2023-09-07 | End: 2023-09-14 | Stop reason: HOSPADM

## 2023-09-07 RX ORDER — LANOLIN ALCOHOL/MO/W.PET/CERES
6 CREAM (GRAM) TOPICAL NIGHTLY PRN
Status: DISCONTINUED | OUTPATIENT
Start: 2023-09-07 | End: 2023-09-14 | Stop reason: HOSPADM

## 2023-09-07 RX ORDER — MAGNESIUM SULFATE IN WATER 40 MG/ML
2000 INJECTION, SOLUTION INTRAVENOUS PRN
Status: DISCONTINUED | OUTPATIENT
Start: 2023-09-07 | End: 2023-09-14 | Stop reason: HOSPADM

## 2023-09-07 RX ORDER — INSULIN LISPRO 100 [IU]/ML
1-20 INJECTION, SOLUTION INTRAVENOUS; SUBCUTANEOUS
Status: DISCONTINUED | OUTPATIENT
Start: 2023-09-09 | End: 2023-09-08

## 2023-09-07 RX ORDER — LIDOCAINE HYDROCHLORIDE 20 MG/ML
INJECTION, SOLUTION INTRAVENOUS PRN
Status: DISCONTINUED | OUTPATIENT
Start: 2023-09-07 | End: 2023-09-07 | Stop reason: SDUPTHER

## 2023-09-07 RX ORDER — VECURONIUM BROMIDE 1 MG/ML
INJECTION, POWDER, LYOPHILIZED, FOR SOLUTION INTRAVENOUS PRN
Status: DISCONTINUED | OUTPATIENT
Start: 2023-09-07 | End: 2023-09-07 | Stop reason: SDUPTHER

## 2023-09-07 RX ORDER — PROTAMINE SULFATE 10 MG/ML
INJECTION, SOLUTION INTRAVENOUS PRN
Status: DISCONTINUED | OUTPATIENT
Start: 2023-09-07 | End: 2023-09-07 | Stop reason: SDUPTHER

## 2023-09-07 RX ORDER — SUCCINYLCHOLINE/SOD CL,ISO/PF 200MG/10ML
SYRINGE (ML) INTRAVENOUS PRN
Status: DISCONTINUED | OUTPATIENT
Start: 2023-09-07 | End: 2023-09-07 | Stop reason: SDUPTHER

## 2023-09-07 RX ADMIN — OXYCODONE HYDROCHLORIDE 10 MG: 5 TABLET ORAL at 21:25

## 2023-09-07 RX ADMIN — VECURONIUM BROMIDE 3 MG: 10 INJECTION, POWDER, LYOPHILIZED, FOR SOLUTION INTRAVENOUS at 09:18

## 2023-09-07 RX ADMIN — FENTANYL CITRATE 50 MCG/HR: 0.05 INJECTION, SOLUTION INTRAMUSCULAR; INTRAVENOUS at 08:25

## 2023-09-07 RX ADMIN — HYDROMORPHONE HYDROCHLORIDE 0.5 MG: 1 INJECTION, SOLUTION INTRAMUSCULAR; INTRAVENOUS; SUBCUTANEOUS at 15:58

## 2023-09-07 RX ADMIN — FENTANYL CITRATE 50 MCG: 50 INJECTION, SOLUTION INTRAMUSCULAR; INTRAVENOUS at 08:23

## 2023-09-07 RX ADMIN — Medication 0.55 UNITS/HR: at 10:42

## 2023-09-07 RX ADMIN — MIDAZOLAM 1 MG: 1 INJECTION INTRAMUSCULAR; INTRAVENOUS at 12:24

## 2023-09-07 RX ADMIN — SODIUM CHLORIDE 40 MG: 9 INJECTION, SOLUTION INTRAMUSCULAR; INTRAVENOUS; SUBCUTANEOUS at 12:30

## 2023-09-07 RX ADMIN — FENTANYL CITRATE 100 MCG: 0.05 INJECTION, SOLUTION INTRAMUSCULAR; INTRAVENOUS at 06:54

## 2023-09-07 RX ADMIN — POTASSIUM CHLORIDE 20 MEQ: 29.8 INJECTION, SOLUTION INTRAVENOUS at 14:18

## 2023-09-07 RX ADMIN — OXYCODONE HYDROCHLORIDE 5 MG: 5 TABLET ORAL at 00:33

## 2023-09-07 RX ADMIN — ACETAMINOPHEN 975 MG: 325 TABLET ORAL at 19:10

## 2023-09-07 RX ADMIN — PHENYLEPHRINE HYDROCHLORIDE 100 MCG: 10 INJECTION INTRAVENOUS at 09:23

## 2023-09-07 RX ADMIN — ATORVASTATIN CALCIUM 40 MG: 40 TABLET, FILM COATED ORAL at 21:25

## 2023-09-07 RX ADMIN — SODIUM CHLORIDE, PRESERVATIVE FREE 10 ML: 5 INJECTION INTRAVENOUS at 21:27

## 2023-09-07 RX ADMIN — SODIUM CHLORIDE: 9 INJECTION, SOLUTION INTRAVENOUS at 06:33

## 2023-09-07 RX ADMIN — SODIUM CHLORIDE: 9 INJECTION, SOLUTION INTRAVENOUS at 12:09

## 2023-09-07 RX ADMIN — CEFAZOLIN 2 G: 330 INJECTION, POWDER, FOR SOLUTION INTRAMUSCULAR; INTRAVENOUS at 08:22

## 2023-09-07 RX ADMIN — PHENYLEPHRINE HYDROCHLORIDE 100 MCG: 10 INJECTION INTRAVENOUS at 09:24

## 2023-09-07 RX ADMIN — ALBUMIN (HUMAN) 250 ML: 0.25 INJECTION, SOLUTION INTRAVENOUS at 10:25

## 2023-09-07 RX ADMIN — Medication 160 MG: at 08:02

## 2023-09-07 RX ADMIN — PROTAMINE SULFATE 150 MG: 10 INJECTION, SOLUTION INTRAVENOUS at 10:21

## 2023-09-07 RX ADMIN — DESMOPRESSIN ACETATE 23.58 MCG: 4 INJECTION, SOLUTION INTRAVENOUS; SUBCUTANEOUS at 10:40

## 2023-09-07 RX ADMIN — PHENYLEPHRINE HYDROCHLORIDE 100 MCG: 10 INJECTION INTRAVENOUS at 09:14

## 2023-09-07 RX ADMIN — PROPOFOL 100 MG: 10 INJECTION, EMULSION INTRAVENOUS at 08:02

## 2023-09-07 RX ADMIN — SODIUM CHLORIDE, POTASSIUM CHLORIDE, SODIUM LACTATE AND CALCIUM CHLORIDE: 600; 310; 30; 20 INJECTION, SOLUTION INTRAVENOUS at 08:02

## 2023-09-07 RX ADMIN — SODIUM CHLORIDE, SODIUM LACTATE, CALCIUM CHLORIDE, MAGNESIUM CHLORIDE AND DEXTROSE 6000 ML: 2.5; 538; 448; 18.3; 5.08 INJECTION, SOLUTION INTRAPERITONEAL at 14:46

## 2023-09-07 RX ADMIN — HEPARIN SODIUM 32000 UNITS: 1000 INJECTION, SOLUTION INTRAVENOUS; SUBCUTANEOUS at 09:07

## 2023-09-07 RX ADMIN — FENTANYL CITRATE 50 MCG: 50 INJECTION, SOLUTION INTRAMUSCULAR; INTRAVENOUS at 08:02

## 2023-09-07 RX ADMIN — HYDROMORPHONE HYDROCHLORIDE 0.5 MG: 1 INJECTION, SOLUTION INTRAMUSCULAR; INTRAVENOUS; SUBCUTANEOUS at 19:29

## 2023-09-07 RX ADMIN — PHENYLEPHRINE HYDROCHLORIDE 100 MCG: 10 INJECTION INTRAVENOUS at 09:10

## 2023-09-07 RX ADMIN — PHENYLEPHRINE HYDROCHLORIDE 100 MCG: 10 INJECTION INTRAVENOUS at 09:12

## 2023-09-07 RX ADMIN — DEXMEDETOMIDINE HYDROCHLORIDE 0.2 MCG/KG/HR: 4 INJECTION, SOLUTION INTRAVENOUS at 10:02

## 2023-09-07 RX ADMIN — PHENYLEPHRINE HYDROCHLORIDE 100 MCG: 10 INJECTION INTRAVENOUS at 08:11

## 2023-09-07 RX ADMIN — LIDOCAINE HYDROCHLORIDE 60 MG: 20 INJECTION, SOLUTION INTRAVENOUS at 08:02

## 2023-09-07 RX ADMIN — DULOXETINE 20 MG: 20 CAPSULE, DELAYED RELEASE ORAL at 21:26

## 2023-09-07 RX ADMIN — SUGAMMADEX 400 MG: 100 INJECTION, SOLUTION INTRAVENOUS at 12:00

## 2023-09-07 RX ADMIN — SENNOSIDES AND DOCUSATE SODIUM 1 TABLET: 50; 8.6 TABLET ORAL at 21:26

## 2023-09-07 RX ADMIN — PHENYLEPHRINE HYDROCHLORIDE 100 MCG: 10 INJECTION INTRAVENOUS at 09:21

## 2023-09-07 RX ADMIN — MUPIROCIN: 20 OINTMENT TOPICAL at 13:13

## 2023-09-07 RX ADMIN — ACETAMINOPHEN 975 MG: 325 TABLET ORAL at 23:54

## 2023-09-07 RX ADMIN — CHLORHEXIDINE GLUCONATE 15 ML: 1.2 RINSE ORAL at 21:27

## 2023-09-07 RX ADMIN — OXYCODONE HYDROCHLORIDE 10 MG: 5 TABLET ORAL at 16:51

## 2023-09-07 RX ADMIN — Medication 20 MCG: at 09:01

## 2023-09-07 RX ADMIN — MIDAZOLAM 2 MG: 1 INJECTION INTRAMUSCULAR; INTRAVENOUS at 06:54

## 2023-09-07 RX ADMIN — SODIUM CHLORIDE: 4.5 INJECTION, SOLUTION INTRAVENOUS at 12:10

## 2023-09-07 RX ADMIN — CHLORHEXIDINE GLUCONATE 15 ML: 1.2 RINSE ORAL at 13:13

## 2023-09-07 RX ADMIN — GENTAMICIN SULFATE: 1 CREAM TOPICAL at 14:46

## 2023-09-07 RX ADMIN — PHENYLEPHRINE HYDROCHLORIDE 100 MCG: 10 INJECTION INTRAVENOUS at 08:15

## 2023-09-07 RX ADMIN — DEXAMETHASONE SODIUM PHOSPHATE 4 MG: 4 INJECTION, SOLUTION INTRAMUSCULAR; INTRAVENOUS at 08:12

## 2023-09-07 RX ADMIN — PHENYLEPHRINE HYDROCHLORIDE 40 MCG/MIN: 10 INJECTION INTRAVENOUS at 10:44

## 2023-09-07 RX ADMIN — ALBUMIN (HUMAN) 12.5 G: 12.5 INJECTION, SOLUTION INTRAVENOUS at 16:55

## 2023-09-07 RX ADMIN — MUPIROCIN: 20 OINTMENT TOPICAL at 21:27

## 2023-09-07 RX ADMIN — HYDROMORPHONE HYDROCHLORIDE 1 MG: 2 INJECTION INTRAMUSCULAR; INTRAVENOUS; SUBCUTANEOUS at 12:04

## 2023-09-07 RX ADMIN — PHENYLEPHRINE HYDROCHLORIDE 100 MCG: 10 INJECTION INTRAVENOUS at 09:07

## 2023-09-07 RX ADMIN — ROCURONIUM BROMIDE 50 MG: 10 SOLUTION INTRAVENOUS at 08:04

## 2023-09-07 RX ADMIN — Medication 10000 MG/HR: at 08:25

## 2023-09-07 ASSESSMENT — PAIN DESCRIPTION - ONSET: ONSET: ON-GOING

## 2023-09-07 ASSESSMENT — PAIN DESCRIPTION - ORIENTATION
ORIENTATION: LEFT
ORIENTATION: LOWER

## 2023-09-07 ASSESSMENT — PAIN SCALES - GENERAL
PAINLEVEL_OUTOF10: 4
PAINLEVEL_OUTOF10: 7
PAINLEVEL_OUTOF10: 3
PAINLEVEL_OUTOF10: 7
PAINLEVEL_OUTOF10: 2
PAINLEVEL_OUTOF10: 7
PAINLEVEL_OUTOF10: 9
PAINLEVEL_OUTOF10: 8
PAINLEVEL_OUTOF10: 4

## 2023-09-07 ASSESSMENT — PAIN DESCRIPTION - LOCATION
LOCATION: CHEST
LOCATION: INCISION;BACK
LOCATION: BACK;INCISION
LOCATION: INCISION;BACK
LOCATION: INCISION;CHEST
LOCATION: BACK

## 2023-09-07 ASSESSMENT — PAIN - FUNCTIONAL ASSESSMENT
PAIN_FUNCTIONAL_ASSESSMENT: NONE - DENIES PAIN
PAIN_FUNCTIONAL_ASSESSMENT: ACTIVITIES ARE NOT PREVENTED

## 2023-09-07 ASSESSMENT — PAIN SCALES - WONG BAKER: WONGBAKER_NUMERICALRESPONSE: 2

## 2023-09-07 ASSESSMENT — PAIN DESCRIPTION - PAIN TYPE: TYPE: CHRONIC PAIN

## 2023-09-07 ASSESSMENT — ENCOUNTER SYMPTOMS: SHORTNESS OF BREATH: 1

## 2023-09-07 ASSESSMENT — PAIN DESCRIPTION - DESCRIPTORS
DESCRIPTORS: ACHING
DESCRIPTORS: ACHING

## 2023-09-07 ASSESSMENT — PAIN DESCRIPTION - FREQUENCY: FREQUENCY: CONTINUOUS

## 2023-09-07 NOTE — BRIEF OP NOTE
BRIEF OP NOTE  Pre-Op Diagnosis: Coronary artery disease, unspecified vessel or lesion type, unspecified whether angina present, unspecified whether native or transplanted heart [I25.10]    Post-Op Diagnosis: Same as pre-op diagnosis    Procedure: Procedure(s):  CORONARY ARTERY BYPASS GRAFTING X 3 (LMA-LAD, SVG-OM1-OM2) WITH [place], [address], ECC, SONIA AND EPIAORTIC U/S BY DR YANCEY    Surgeon: Dr. Aniyah Hernandez MD    Assistant(s): Mer Grimaldo PA-C, Shara Boyle PA-C    Anesthesia: General     Infusions: Precedex, insulin,     Estimated Blood Loss: 850 ml    Cell Saver: 485 ml    Specimens: * No specimens in log *    Drains and pacing wires: 2 atrial wires, 1 bipolar ventricular wire, 3 sudarshan drains (2 mediastinal, 1 L pleural)    Complications: None    Findings: See full operative note.     Implants: * No implants in log *

## 2023-09-07 NOTE — ANESTHESIA PROCEDURE NOTES
Central Venous Line:    A central venous line was placed using ultrasound guidance and surface landmarks, in the pre-op for the following indication(s): central venous access and CVP monitoring. Sterility preparation included the following: hand hygiene performed prior to procedure, maximum sterile barriers used and sterile technique used to drape from head to toe. The patient was placed in Trendelenburg position. The left (dialysis catheter RIJ) internal jugular vein was prepped. The site was prepped with Chloraprep. A 9 Fr (size), 11.5 (length), introducer double lumen was placed. During the procedure, the following specific steps were taken: target vein identified, needle advanced into vein and blood aspirated and guidewire advanced into vein. Intravenous verification was obtained by ultrasound, venous blood return, SONIA, SONIA and manometry. Post insertion care included: all ports aspirated, all ports flushed easily, guidewire removed intact, Biopatch applied, line sutured in place and dressing applied. During the procedure the patient experienced: patient tolerated procedure well with no complications. Outcomes: uncomplicated  Real-time US image taken/store: yes  Anesthesia type: local.. No    Additional notes:  Single lumen introducer catheter placed without event   Staffing  Performed: Anesthesiologist   Preanesthetic Checklist  Completed: patient identified, IV checked, site marked, risks and benefits discussed, surgical/procedural consents, equipment checked, pre-op evaluation, timeout performed, anesthesia consent given, oxygen available and monitors applied/VS acknowledged

## 2023-09-07 NOTE — ANESTHESIA PROCEDURE NOTES
Procedure Performed: SONIA       Start Time:        End Time:      Preanesthesia Checklist:  Patient identified, IV assessed, risks and benefits discussed, monitors and equipment assessed, procedure being performed at surgeon's request and anesthesia consent obtained. General Procedure Information  Diagnostic Indications for Echo:  assessment of ascending aorta, assessment of surgical repair, hemodynamic monitoring and assessment of valve function  Location performed:  OR  Intubated  Bite block not placed  Heart visualized  Probe Insertion:  Easy  Probe Type:  Mulitplane, 3D and epiaortic  Modalities:  2D, color flow mapping, 3D, continuous wave Doppler and pulse wave Doppler    Echocardiographic and Doppler Measurements    Ventricles    Right Ventricle:  Hypertrophy not present. Thrombus not present. Global function normal.    Left Ventricle:  Hypertrophy present. Thrombus not present. Global Function moderately impaired. Ejection Fraction 35%. Ventricular Regional Function:  1- Basal Anteroseptal:  hypokinetic  2- Basal Anterior:  hypokinetic  3- Basal Anterolateral:  hypokinetic  4- Basal Inferolateral:  hypokinetic  5- Basal Inferior:  hypokinetic  6- Basal Inferoseptal:  hypokinetic  7- Mid Anteroseptal:  hypokinetic  8- Mid Anterior:  hypokinetic  9- Mid Anterolateral:  hypokinetic  10- Mid Inferolateral:  hypokinetic  11- Mid Inferior:  hypokinetic  12- Mid Inferoseptal:  hypokinetic  13- Apical Anterior:  hypokinetic  14- Apical Lateral:  hypokinetic  15- Apical Inferior:  hypokinetic  16- Apical Septal:  hypokinetic  17- [place]:  hypokinetic    Wall Motion Comments:       Most hypokinetic walls were the inferior and apical walls     Valves    Aortic Valve: Annulus normal.  Stenosis not present. Regurgitation none. Leaflet motions normal.      Mitral Valve: Annulus calcified. Stenosis not present. Regurgitation mild. Leaflets thickened. Leaflet motions normal.      Tricuspid Valve:   Annulus

## 2023-09-07 NOTE — ANESTHESIA PRE PROCEDURE
Department of Anesthesiology  Preprocedure Note       Name:  Yael Matson   Age:  70 y.o.  :  1955                                          MRN:  [id number]         Date:  2023      Surgeon: Yao Zavala):  Scott Flores MD    Procedure: Procedure(s):  ON PUMP CORONARY ARTERY BYPASS GRAFT (CABG) WITH ENDOSCOPIC VEIN HARVEST (NO PULMONARY ARTERY CATHETER)    Medications prior to admission:   Prior to Admission medications    Medication Sig Start Date End Date Taking?  Authorizing Provider   potassium chloride (MICRO-K) 10 MEQ extended release capsule Take 2 capsules by mouth 2 times daily    Historical Provider, MD   folic acid (FOLVITE) 1 MG tablet Take 1 tablet by mouth daily 23   Mohit Morin MD   insulin glargine (LANTUS) 100 UNIT/ML injection vial Inject 5 Units into the skin daily (with breakfast) Check blood sugars before using administrating lantus Hold if blood sugars consistently below 70 23  Mohit Morin MD   methocarbamol (ROBAXIN) 500 MG tablet Take 1 tablet by mouth 3 times daily 23   Historical Provider, MD   albuterol sulfate HFA (PROVENTIL;VENTOLIN;PROAIR) 108 (90 Base) MCG/ACT inhaler Inhale 2 puffs into the lungs every 6 hours as needed    Ar Automatic Reconciliation   amLODIPine (NORVASC) 10 MG tablet Take 1 tablet by mouth daily 22   Ar Automatic Reconciliation   aspirin 325 MG tablet Take 1 tablet by mouth daily  Patient not taking: Reported on 2023    Ar Automatic Reconciliation   carvedilol (COREG) 25 MG tablet Take 1 tablet by mouth 2 times daily (with meals)    Ar Automatic Reconciliation   diclofenac sodium (VOLTAREN) 1 % GEL Apply topically daily 22   Ar Automatic Reconciliation   DULoxetine (CYMBALTA) 20 MG extended release capsule Take 1 capsule by mouth 2 times daily 22   Ar Automatic Reconciliation   hydrALAZINE (APRESOLINE) 50 MG tablet Take 1 tablet by mouth 3 times daily 12/10/21   Ar Automatic Reconciliation

## 2023-09-07 NOTE — FLOWSHEET NOTE
CCPD CONNECTION    Primary RN SBAR: TY Shah, JANES  Patient Education: procedural / infection control  Hepatitis B Surface Ag   Date/Time Value Ref Range Status   08/25/2023 12:35 AM <0.10 Index Final     Hep B S Ab   Date/Time Value Ref Range Status   08/20/2023 10:01 AM <3.10 mIU/mL Final        At bedside to initiate CCPD tx for the night. Pt orders, notes, labs, code status reviewed. Prior to aseptic connection 2 min Alcavis scrub / soak preformed to transfer set. Remained present during initial drain followed by initiation of first fill. Prior to departure pt sitting up in chair, call bell and personal belongings within reach. Lines visible, secure, and connections fastened well. Education and pre/post report given to pt & primary RN. Start: 9/7/23 1515  End: 9/8/23 0045 09/07/23 1515   Vital Signs   BP (!) 104/54   Pulse 75   Respirations 14   SpO2 99 %   Observations & Evaluations   Level of Consciousness 1   Oriented X   (BRIAN)   Heart Rhythm   (ICU monitoring)   O2 Device Ventilator   Skin Color   (appropriate for ethnicity)   Skin Condition/Temp Warm;Dry   Edema   (trace)   Technical Checks   ICEBOAT I;C;E;B;O;A;T        09/07/23 1515   Vitals   BP (!) 104/54   Pulse 75   Respirations 14   SpO2 99 %   Peritoneal Dialysis Catheter Mid lower abdomen   No placement date or time found.    Catheter Location: Mid lower abdomen   Status Accessed   Site Condition Clean, dry, intact   Dressing Status New dressing applied;Clean, dry & intact   Dressing Gauze   Date of Last Dressing Change 09/07/23   Dialysis Type Continuous cycling   Exit Site Condition excellent   Catheter Care Given Yes   Cycler   Verification of Prescription CCPD   Informed Consent    (chronic applies)   Total Volume Programmed 31474 mL   Therapy Time (Hours:Minutes) 9.5   Cycler Type Jung HomeChoice   Fill Volume 2300 mL   Last Fill Volume 0 mL   Dextrose Setting Same (Nonextraneal)   I Drain Alarm 0 mL   Number of Cycles 5

## 2023-09-07 NOTE — FLOWSHEET NOTE
09/07/23 0420   Peritoneal Dialysis Catheter Mid lower abdomen   No placement date or time found. Catheter Location: Mid lower abdomen   Status Deaccessed   Site Condition Clean, dry, intact   Dressing Status Clean, dry & intact   Dressing Gauze   Date of Last Dressing Change 09/06/23   Dialysis Type Continuous cycling   Exit Site Condition excellent   Catheter Care Given Yes  (Two minute Alcavis scrub/soak prior to disconnection)   Post-Treatment (Cycler)   Average Dwell Time (Hours:Minutes) 1:23   Lost Dwell Time (Hours:Minutes) :05   Effluent Appearance Clear;Yellow   PD Output (mL) 392 mL  (Idrain 243 + total  = net  ml)     Primary RN SBAR: Ulises Shirley RN  Comments: PD catheter taped securely to the patient's abdomen after disconnection. Used cassette, lines and bags discarded in red bin. Patient left with bed low, siderails X 2, call bell and belongings in reach.

## 2023-09-08 ENCOUNTER — APPOINTMENT (OUTPATIENT)
Facility: HOSPITAL | Age: 68
DRG: 853 | End: 2023-09-08
Payer: MEDICARE

## 2023-09-08 LAB
ANION GAP SERPL CALC-SCNC: 7 MMOL/L (ref 5–15)
BUN SERPL-MCNC: 17 MG/DL (ref 6–20)
BUN/CREAT SERPL: 3 (ref 12–20)
CALCIUM SERPL-MCNC: 8.3 MG/DL (ref 8.5–10.1)
CHLORIDE SERPL-SCNC: 107 MMOL/L (ref 97–108)
CO2 SERPL-SCNC: 26 MMOL/L (ref 21–32)
CREAT SERPL-MCNC: 6.15 MG/DL (ref 0.55–1.02)
ERYTHROCYTE [DISTWIDTH] IN BLOOD BY AUTOMATED COUNT: 19.6 % (ref 11.5–14.5)
GLUCOSE BLD STRIP.AUTO-MCNC: 101 MG/DL (ref 65–117)
GLUCOSE BLD STRIP.AUTO-MCNC: 101 MG/DL (ref 65–117)
GLUCOSE BLD STRIP.AUTO-MCNC: 102 MG/DL (ref 65–117)
GLUCOSE BLD STRIP.AUTO-MCNC: 108 MG/DL (ref 65–117)
GLUCOSE BLD STRIP.AUTO-MCNC: 113 MG/DL (ref 65–117)
GLUCOSE BLD STRIP.AUTO-MCNC: 113 MG/DL (ref 65–117)
GLUCOSE BLD STRIP.AUTO-MCNC: 128 MG/DL (ref 65–117)
GLUCOSE BLD STRIP.AUTO-MCNC: 134 MG/DL (ref 65–117)
GLUCOSE BLD STRIP.AUTO-MCNC: 193 MG/DL (ref 65–117)
GLUCOSE BLD STRIP.AUTO-MCNC: 366 MG/DL (ref 65–117)
GLUCOSE BLD STRIP.AUTO-MCNC: 60 MG/DL (ref 65–117)
GLUCOSE BLD STRIP.AUTO-MCNC: 61 MG/DL (ref 65–117)
GLUCOSE BLD STRIP.AUTO-MCNC: 67 MG/DL (ref 65–117)
GLUCOSE BLD STRIP.AUTO-MCNC: 91 MG/DL (ref 65–117)
GLUCOSE SERPL-MCNC: 87 MG/DL (ref 65–100)
HCT VFR BLD AUTO: 27.3 % (ref 35–47)
HGB BLD-MCNC: 8.4 G/DL (ref 11.5–16)
MAGNESIUM SERPL-MCNC: 2 MG/DL (ref 1.6–2.4)
MCH RBC QN AUTO: 29.8 PG (ref 26–34)
MCHC RBC AUTO-ENTMCNC: 30.8 G/DL (ref 30–36.5)
MCV RBC AUTO: 96.8 FL (ref 80–99)
NRBC # BLD: 0 K/UL (ref 0–0.01)
NRBC BLD-RTO: 0 PER 100 WBC
PLATELET # BLD AUTO: 218 K/UL (ref 150–400)
PMV BLD AUTO: 10.2 FL (ref 8.9–12.9)
POTASSIUM SERPL-SCNC: 3.7 MMOL/L (ref 3.5–5.1)
RBC # BLD AUTO: 2.82 M/UL (ref 3.8–5.2)
SERVICE CMNT-IMP: ABNORMAL
SERVICE CMNT-IMP: NORMAL
SODIUM SERPL-SCNC: 140 MMOL/L (ref 136–145)
WBC # BLD AUTO: 14.5 K/UL (ref 3.6–11)

## 2023-09-08 PROCEDURE — 6370000000 HC RX 637 (ALT 250 FOR IP)

## 2023-09-08 PROCEDURE — 2580000003 HC RX 258

## 2023-09-08 PROCEDURE — 80048 BASIC METABOLIC PNL TOTAL CA: CPT

## 2023-09-08 PROCEDURE — 93005 ELECTROCARDIOGRAM TRACING: CPT

## 2023-09-08 PROCEDURE — 71045 X-RAY EXAM CHEST 1 VIEW: CPT

## 2023-09-08 PROCEDURE — 6360000002 HC RX W HCPCS

## 2023-09-08 PROCEDURE — 83735 ASSAY OF MAGNESIUM: CPT

## 2023-09-08 PROCEDURE — 82962 GLUCOSE BLOOD TEST: CPT

## 2023-09-08 PROCEDURE — P9045 ALBUMIN (HUMAN), 5%, 250 ML: HCPCS

## 2023-09-08 PROCEDURE — 99231 SBSQ HOSP IP/OBS SF/LOW 25: CPT

## 2023-09-08 PROCEDURE — 6370000000 HC RX 637 (ALT 250 FOR IP): Performed by: NURSE PRACTITIONER

## 2023-09-08 PROCEDURE — 36415 COLL VENOUS BLD VENIPUNCTURE: CPT

## 2023-09-08 PROCEDURE — 85027 COMPLETE CBC AUTOMATED: CPT

## 2023-09-08 PROCEDURE — 2000000000 HC ICU R&B

## 2023-09-08 PROCEDURE — 2580000003 HC RX 258: Performed by: ANESTHESIOLOGY

## 2023-09-08 RX ORDER — INSULIN LISPRO 100 [IU]/ML
0-4 INJECTION, SOLUTION INTRAVENOUS; SUBCUTANEOUS
Status: DISCONTINUED | OUTPATIENT
Start: 2023-09-08 | End: 2023-09-14 | Stop reason: HOSPADM

## 2023-09-08 RX ORDER — ASPIRIN 81 MG/1
81 TABLET, CHEWABLE ORAL DAILY
Status: DISCONTINUED | OUTPATIENT
Start: 2023-09-08 | End: 2023-09-14 | Stop reason: HOSPADM

## 2023-09-08 RX ORDER — INSULIN LISPRO 100 [IU]/ML
0-4 INJECTION, SOLUTION INTRAVENOUS; SUBCUTANEOUS NIGHTLY
Status: DISCONTINUED | OUTPATIENT
Start: 2023-09-08 | End: 2023-09-14 | Stop reason: HOSPADM

## 2023-09-08 RX ORDER — INSULIN LISPRO 100 [IU]/ML
0.05 INJECTION, SOLUTION INTRAVENOUS; SUBCUTANEOUS
Status: DISCONTINUED | OUTPATIENT
Start: 2023-09-08 | End: 2023-09-09

## 2023-09-08 RX ORDER — INSULIN GLARGINE 100 [IU]/ML
0.15 INJECTION, SOLUTION SUBCUTANEOUS NIGHTLY
Status: DISCONTINUED | OUTPATIENT
Start: 2023-09-08 | End: 2023-09-09

## 2023-09-08 RX ORDER — INSULIN GLARGINE 100 [IU]/ML
0.15 INJECTION, SOLUTION SUBCUTANEOUS NIGHTLY
Status: DISCONTINUED | OUTPATIENT
Start: 2023-09-08 | End: 2023-09-08

## 2023-09-08 RX ORDER — DEXTROSE MONOHYDRATE 100 MG/ML
INJECTION, SOLUTION INTRAVENOUS CONTINUOUS PRN
Status: DISCONTINUED | OUTPATIENT
Start: 2023-09-08 | End: 2023-09-10

## 2023-09-08 RX ADMIN — ASPIRIN 81 MG CHEWABLE TABLET 81 MG: 81 TABLET CHEWABLE at 14:15

## 2023-09-08 RX ADMIN — AMIODARONE HYDROCHLORIDE 400 MG: 200 TABLET ORAL at 09:29

## 2023-09-08 RX ADMIN — HYDROMORPHONE HYDROCHLORIDE 0.5 MG: 1 INJECTION, SOLUTION INTRAMUSCULAR; INTRAVENOUS; SUBCUTANEOUS at 04:31

## 2023-09-08 RX ADMIN — MUPIROCIN: 20 OINTMENT TOPICAL at 09:32

## 2023-09-08 RX ADMIN — WATER 1000 MG: 1 INJECTION INTRAMUSCULAR; INTRAVENOUS; SUBCUTANEOUS at 09:39

## 2023-09-08 RX ADMIN — Medication 400 MG: at 09:29

## 2023-09-08 RX ADMIN — ACETAMINOPHEN 975 MG: 325 TABLET ORAL at 05:17

## 2023-09-08 RX ADMIN — SODIUM CHLORIDE, SODIUM LACTATE, CALCIUM CHLORIDE, MAGNESIUM CHLORIDE AND DEXTROSE 6000 ML: 2.5; 538; 448; 18.3; 5.08 INJECTION, SOLUTION INTRAPERITONEAL at 14:50

## 2023-09-08 RX ADMIN — MUPIROCIN: 20 OINTMENT TOPICAL at 22:26

## 2023-09-08 RX ADMIN — HYDROMORPHONE HYDROCHLORIDE 0.5 MG: 1 INJECTION, SOLUTION INTRAMUSCULAR; INTRAVENOUS; SUBCUTANEOUS at 10:15

## 2023-09-08 RX ADMIN — GENTAMICIN SULFATE: 1 CREAM TOPICAL at 14:50

## 2023-09-08 RX ADMIN — ATORVASTATIN CALCIUM 40 MG: 40 TABLET, FILM COATED ORAL at 22:10

## 2023-09-08 RX ADMIN — ALBUMIN (HUMAN) 12.5 G: 12.5 INJECTION, SOLUTION INTRAVENOUS at 04:30

## 2023-09-08 RX ADMIN — CHLORHEXIDINE GLUCONATE 15 ML: 1.2 RINSE ORAL at 09:32

## 2023-09-08 RX ADMIN — INSULIN GLARGINE 12 UNITS: 100 INJECTION, SOLUTION SUBCUTANEOUS at 12:53

## 2023-09-08 RX ADMIN — ALBUMIN (HUMAN) 12.5 G: 12.5 INJECTION, SOLUTION INTRAVENOUS at 07:20

## 2023-09-08 RX ADMIN — CHLORHEXIDINE GLUCONATE 15 ML: 1.2 RINSE ORAL at 22:26

## 2023-09-08 RX ADMIN — ACETAMINOPHEN 975 MG: 325 TABLET ORAL at 23:48

## 2023-09-08 RX ADMIN — DULOXETINE 20 MG: 20 CAPSULE, DELAYED RELEASE ORAL at 22:10

## 2023-09-08 RX ADMIN — OXYCODONE HYDROCHLORIDE 10 MG: 5 TABLET ORAL at 16:33

## 2023-09-08 RX ADMIN — DULOXETINE 20 MG: 20 CAPSULE, DELAYED RELEASE ORAL at 09:29

## 2023-09-08 RX ADMIN — SODIUM CHLORIDE, PRESERVATIVE FREE 10 ML: 5 INJECTION INTRAVENOUS at 22:13

## 2023-09-08 RX ADMIN — SENNOSIDES AND DOCUSATE SODIUM 1 TABLET: 50; 8.6 TABLET ORAL at 09:30

## 2023-09-08 RX ADMIN — AMIODARONE HYDROCHLORIDE 400 MG: 200 TABLET ORAL at 22:10

## 2023-09-08 RX ADMIN — Medication 400 MG: at 22:10

## 2023-09-08 RX ADMIN — ONDANSETRON 4 MG: 2 INJECTION INTRAMUSCULAR; INTRAVENOUS at 13:05

## 2023-09-08 RX ADMIN — Medication 4 UNITS: at 18:20

## 2023-09-08 RX ADMIN — EPOETIN ALFA-EPBX 20000 UNITS: 20000 INJECTION, SOLUTION INTRAVENOUS; SUBCUTANEOUS at 12:50

## 2023-09-08 RX ADMIN — DEXTROSE MONOHYDRATE 125 ML: 100 INJECTION, SOLUTION INTRAVENOUS at 02:17

## 2023-09-08 RX ADMIN — OXYCODONE HYDROCHLORIDE 10 MG: 5 TABLET ORAL at 22:09

## 2023-09-08 RX ADMIN — Medication 4 UNITS: at 22:19

## 2023-09-08 RX ADMIN — SODIUM CHLORIDE, SODIUM LACTATE, CALCIUM CHLORIDE, MAGNESIUM CHLORIDE AND DEXTROSE 6000 ML: 2.5; 538; 448; 18.3; 5.08 INJECTION, SOLUTION INTRAPERITONEAL at 14:49

## 2023-09-08 RX ADMIN — OXYCODONE HYDROCHLORIDE 10 MG: 5 TABLET ORAL at 08:26

## 2023-09-08 RX ADMIN — OXYCODONE HYDROCHLORIDE 10 MG: 5 TABLET ORAL at 12:39

## 2023-09-08 RX ADMIN — ACETAMINOPHEN 975 MG: 325 TABLET ORAL at 12:56

## 2023-09-08 RX ADMIN — HYDROMORPHONE HYDROCHLORIDE 0.5 MG: 1 INJECTION, SOLUTION INTRAMUSCULAR; INTRAVENOUS; SUBCUTANEOUS at 23:52

## 2023-09-08 RX ADMIN — PANTOPRAZOLE SODIUM 40 MG: 40 TABLET, DELAYED RELEASE ORAL at 09:30

## 2023-09-08 ASSESSMENT — PAIN DESCRIPTION - ORIENTATION
ORIENTATION: POSTERIOR
ORIENTATION: ANTERIOR
ORIENTATION: LEFT
ORIENTATION: ANTERIOR
ORIENTATION: ANTERIOR;LEFT
ORIENTATION: POSTERIOR;MID
ORIENTATION: POSTERIOR

## 2023-09-08 ASSESSMENT — PAIN SCALES - GENERAL
PAINLEVEL_OUTOF10: 3
PAINLEVEL_OUTOF10: 8
PAINLEVEL_OUTOF10: 10
PAINLEVEL_OUTOF10: 10
PAINLEVEL_OUTOF10: 3
PAINLEVEL_OUTOF10: 8
PAINLEVEL_OUTOF10: 7
PAINLEVEL_OUTOF10: 7
PAINLEVEL_OUTOF10: 10
PAINLEVEL_OUTOF10: 10
PAINLEVEL_OUTOF10: 3
PAINLEVEL_OUTOF10: 2
PAINLEVEL_OUTOF10: 7
PAINLEVEL_OUTOF10: 10
PAINLEVEL_OUTOF10: 10
PAINLEVEL_OUTOF10: 9
PAINLEVEL_OUTOF10: 8
PAINLEVEL_OUTOF10: 10
PAINLEVEL_OUTOF10: 2

## 2023-09-08 ASSESSMENT — PAIN DESCRIPTION - DESCRIPTORS
DESCRIPTORS: ACHING

## 2023-09-08 ASSESSMENT — PAIN DESCRIPTION - LOCATION
LOCATION: CHEST;BACK
LOCATION: BACK
LOCATION: CHEST
LOCATION: CHEST
LOCATION: BACK
LOCATION: CHEST;BACK
LOCATION: CHEST
LOCATION: CHEST
LOCATION: BACK
LOCATION: CHEST
LOCATION: BACK
LOCATION: BACK

## 2023-09-08 ASSESSMENT — PAIN DESCRIPTION - DIRECTION: RADIATING_TOWARDS: BACK

## 2023-09-08 ASSESSMENT — PAIN DESCRIPTION - PAIN TYPE
TYPE: ACUTE PAIN
TYPE: CHRONIC PAIN

## 2023-09-08 NOTE — FLOWSHEET NOTE
Start time: 1500  Est end time: 0130  CCPD Connection: 09/08/23 1430   Vitals   BP (!) 102/48   Pulse 84   Respirations 17   SpO2 100 %   Peritoneal Dialysis Catheter Mid lower abdomen   No placement date or time found.    Catheter Location: Mid lower abdomen   Status Accessed   Site Condition Clean, dry, intact   Dressing Status New dressing applied   Dressing Gauze   Date of Last Dressing Change 09/08/23   Dialysis Type Continuous cycling   Exit Site Condition excellent   Catheter Care Given Yes   Cycler   Verification of Prescription CCPD   Informed Consent  Yes   Total Volume Programmed 93668 mL   Therapy Time (Hours:Minutes) 9:30   Cycler Type Jung HomeChoice   Fill Volume 2300 mL   Last Fill Volume 0 mL   Dextrose Setting Same (Nonextraneal)   I Drain Alarm 0 mL   Number of Cycles 5   Bag Volume 6000 mL   Number of Bags Used 2   Dianeal Solution   (2.5% Dextrose in 6000 mL)   Time Out (time): 1450  Primary RN SBAR: Allie Lynn RN  Patient Education: Infection prevention  Hepatitis B Surface Ag   Date/Time Value Ref Range Status   08/25/2023 12:35 AM <0.10 Index Final     Hep B S Ab   Date/Time Value Ref Range Status   08/20/2023 10:01 AM <3.10 mIU/mL Final

## 2023-09-09 ENCOUNTER — APPOINTMENT (OUTPATIENT)
Facility: HOSPITAL | Age: 68
DRG: 853 | End: 2023-09-09
Payer: MEDICARE

## 2023-09-09 LAB
ANION GAP SERPL CALC-SCNC: 11 MMOL/L (ref 5–15)
BUN SERPL-MCNC: 20 MG/DL (ref 6–20)
BUN/CREAT SERPL: 3 (ref 12–20)
CALCIUM SERPL-MCNC: 8.5 MG/DL (ref 8.5–10.1)
CHLORIDE SERPL-SCNC: 102 MMOL/L (ref 97–108)
CO2 SERPL-SCNC: 24 MMOL/L (ref 21–32)
CREAT SERPL-MCNC: 6.2 MG/DL (ref 0.55–1.02)
EKG ATRIAL RATE: 81 BPM
EKG DIAGNOSIS: NORMAL
EKG P AXIS: 67 DEGREES
EKG P-R INTERVAL: 190 MS
EKG Q-T INTERVAL: 448 MS
EKG QRS DURATION: 82 MS
EKG QTC CALCULATION (BAZETT): 520 MS
EKG R AXIS: -27 DEGREES
EKG T AXIS: 58 DEGREES
EKG VENTRICULAR RATE: 81 BPM
ERYTHROCYTE [DISTWIDTH] IN BLOOD BY AUTOMATED COUNT: 19.4 % (ref 11.5–14.5)
GLUCOSE BLD STRIP.AUTO-MCNC: 181 MG/DL (ref 65–117)
GLUCOSE BLD STRIP.AUTO-MCNC: 231 MG/DL (ref 65–117)
GLUCOSE BLD STRIP.AUTO-MCNC: 244 MG/DL (ref 65–117)
GLUCOSE BLD STRIP.AUTO-MCNC: 263 MG/DL (ref 65–117)
GLUCOSE SERPL-MCNC: 315 MG/DL (ref 65–100)
HCT VFR BLD AUTO: 26 % (ref 35–47)
HGB BLD-MCNC: 7.7 G/DL (ref 11.5–16)
MAGNESIUM SERPL-MCNC: 2.3 MG/DL (ref 1.6–2.4)
MCH RBC QN AUTO: 29.5 PG (ref 26–34)
MCHC RBC AUTO-ENTMCNC: 29.6 G/DL (ref 30–36.5)
MCV RBC AUTO: 99.6 FL (ref 80–99)
NRBC # BLD: 0.02 K/UL (ref 0–0.01)
NRBC BLD-RTO: 0.1 PER 100 WBC
PLATELET # BLD AUTO: 260 K/UL (ref 150–400)
PMV BLD AUTO: 10.3 FL (ref 8.9–12.9)
POTASSIUM SERPL-SCNC: 3.8 MMOL/L (ref 3.5–5.1)
RBC # BLD AUTO: 2.61 M/UL (ref 3.8–5.2)
SERVICE CMNT-IMP: ABNORMAL
SODIUM SERPL-SCNC: 137 MMOL/L (ref 136–145)
WBC # BLD AUTO: 18.9 K/UL (ref 3.6–11)

## 2023-09-09 PROCEDURE — 82962 GLUCOSE BLOOD TEST: CPT

## 2023-09-09 PROCEDURE — 36415 COLL VENOUS BLD VENIPUNCTURE: CPT

## 2023-09-09 PROCEDURE — 2700000000 HC OXYGEN THERAPY PER DAY

## 2023-09-09 PROCEDURE — 6360000002 HC RX W HCPCS

## 2023-09-09 PROCEDURE — 80048 BASIC METABOLIC PNL TOTAL CA: CPT

## 2023-09-09 PROCEDURE — 71045 X-RAY EXAM CHEST 1 VIEW: CPT

## 2023-09-09 PROCEDURE — 90945 DIALYSIS ONE EVALUATION: CPT

## 2023-09-09 PROCEDURE — 83735 ASSAY OF MAGNESIUM: CPT

## 2023-09-09 PROCEDURE — 36556 INSERT NON-TUNNEL CV CATH: CPT

## 2023-09-09 PROCEDURE — APPSS45 APP SPLIT SHARED TIME 31-45 MINUTES: Performed by: NURSE PRACTITIONER

## 2023-09-09 PROCEDURE — 2580000003 HC RX 258: Performed by: ANESTHESIOLOGY

## 2023-09-09 PROCEDURE — 97168 OT RE-EVAL EST PLAN CARE: CPT

## 2023-09-09 PROCEDURE — 6370000000 HC RX 637 (ALT 250 FOR IP): Performed by: NURSE PRACTITIONER

## 2023-09-09 PROCEDURE — 6370000000 HC RX 637 (ALT 250 FOR IP)

## 2023-09-09 PROCEDURE — 2580000003 HC RX 258

## 2023-09-09 PROCEDURE — 93010 ELECTROCARDIOGRAM REPORT: CPT | Performed by: INTERNAL MEDICINE

## 2023-09-09 PROCEDURE — 97530 THERAPEUTIC ACTIVITIES: CPT

## 2023-09-09 PROCEDURE — 2000000000 HC ICU R&B

## 2023-09-09 PROCEDURE — 85027 COMPLETE CBC AUTOMATED: CPT

## 2023-09-09 PROCEDURE — 97164 PT RE-EVAL EST PLAN CARE: CPT

## 2023-09-09 RX ORDER — INSULIN GLARGINE 100 [IU]/ML
15 INJECTION, SOLUTION SUBCUTANEOUS NIGHTLY
Status: DISCONTINUED | OUTPATIENT
Start: 2023-09-09 | End: 2023-09-10

## 2023-09-09 RX ORDER — INSULIN LISPRO 100 [IU]/ML
6 INJECTION, SOLUTION INTRAVENOUS; SUBCUTANEOUS
Status: DISCONTINUED | OUTPATIENT
Start: 2023-09-09 | End: 2023-09-10

## 2023-09-09 RX ADMIN — SODIUM CHLORIDE, PRESERVATIVE FREE 10 ML: 5 INJECTION INTRAVENOUS at 08:41

## 2023-09-09 RX ADMIN — CHLORHEXIDINE GLUCONATE 15 ML: 1.2 RINSE ORAL at 21:09

## 2023-09-09 RX ADMIN — CHLORHEXIDINE GLUCONATE 15 ML: 1.2 RINSE ORAL at 09:21

## 2023-09-09 RX ADMIN — PHENYLEPHRINE HYDROCHLORIDE 40 MCG/MIN: 10 INJECTION INTRAVENOUS at 06:47

## 2023-09-09 RX ADMIN — ACETAMINOPHEN 975 MG: 325 TABLET ORAL at 12:16

## 2023-09-09 RX ADMIN — GENTAMICIN SULFATE: 1 CREAM TOPICAL at 15:58

## 2023-09-09 RX ADMIN — DULOXETINE 20 MG: 20 CAPSULE, DELAYED RELEASE ORAL at 08:36

## 2023-09-09 RX ADMIN — Medication 6 MG: at 18:25

## 2023-09-09 RX ADMIN — MUPIROCIN: 20 OINTMENT TOPICAL at 08:41

## 2023-09-09 RX ADMIN — Medication 6 UNITS: at 09:18

## 2023-09-09 RX ADMIN — INSULIN GLARGINE 15 UNITS: 100 INJECTION, SOLUTION SUBCUTANEOUS at 21:24

## 2023-09-09 RX ADMIN — ACETAMINOPHEN 975 MG: 325 TABLET ORAL at 17:41

## 2023-09-09 RX ADMIN — ASPIRIN 81 MG CHEWABLE TABLET 81 MG: 81 TABLET CHEWABLE at 08:36

## 2023-09-09 RX ADMIN — OXYCODONE HYDROCHLORIDE 10 MG: 5 TABLET ORAL at 22:44

## 2023-09-09 RX ADMIN — SODIUM CHLORIDE, PRESERVATIVE FREE 10 ML: 5 INJECTION INTRAVENOUS at 21:18

## 2023-09-09 RX ADMIN — Medication 1 UNITS: at 09:16

## 2023-09-09 RX ADMIN — Medication 1 UNITS: at 12:16

## 2023-09-09 RX ADMIN — SODIUM CHLORIDE, SODIUM LACTATE, CALCIUM CHLORIDE, MAGNESIUM CHLORIDE AND DEXTROSE 6000 ML: 2.5; 538; 448; 18.3; 5.08 INJECTION, SOLUTION INTRAPERITONEAL at 15:58

## 2023-09-09 RX ADMIN — OXYCODONE HYDROCHLORIDE 5 MG: 5 TABLET ORAL at 18:25

## 2023-09-09 RX ADMIN — Medication 6 UNITS: at 12:16

## 2023-09-09 RX ADMIN — Medication 400 MG: at 09:18

## 2023-09-09 RX ADMIN — OXYCODONE HYDROCHLORIDE 10 MG: 5 TABLET ORAL at 03:11

## 2023-09-09 RX ADMIN — SODIUM CHLORIDE, SODIUM LACTATE, CALCIUM CHLORIDE, MAGNESIUM CHLORIDE AND DEXTROSE 6000 ML: 2.5; 538; 448; 18.3; 5.08 INJECTION, SOLUTION INTRAPERITONEAL at 15:59

## 2023-09-09 RX ADMIN — ACETAMINOPHEN 975 MG: 325 TABLET ORAL at 06:07

## 2023-09-09 RX ADMIN — AMIODARONE HYDROCHLORIDE 400 MG: 200 TABLET ORAL at 08:36

## 2023-09-09 RX ADMIN — OXYCODONE HYDROCHLORIDE 10 MG: 5 TABLET ORAL at 08:35

## 2023-09-09 RX ADMIN — OXYCODONE HYDROCHLORIDE 10 MG: 5 TABLET ORAL at 12:35

## 2023-09-09 RX ADMIN — Medication 400 MG: at 21:09

## 2023-09-09 RX ADMIN — AMIODARONE HYDROCHLORIDE 400 MG: 200 TABLET ORAL at 21:09

## 2023-09-09 RX ADMIN — HYDROMORPHONE HYDROCHLORIDE 0.5 MG: 1 INJECTION, SOLUTION INTRAMUSCULAR; INTRAVENOUS; SUBCUTANEOUS at 06:08

## 2023-09-09 RX ADMIN — ATORVASTATIN CALCIUM 40 MG: 40 TABLET, FILM COATED ORAL at 21:09

## 2023-09-09 RX ADMIN — DULOXETINE 20 MG: 20 CAPSULE, DELAYED RELEASE ORAL at 21:09

## 2023-09-09 RX ADMIN — PANTOPRAZOLE SODIUM 40 MG: 40 TABLET, DELAYED RELEASE ORAL at 08:36

## 2023-09-09 RX ADMIN — MUPIROCIN: 20 OINTMENT TOPICAL at 21:10

## 2023-09-09 ASSESSMENT — PAIN DESCRIPTION - ORIENTATION
ORIENTATION: MID;POSTERIOR
ORIENTATION: POSTERIOR;LOWER
ORIENTATION: MID;POSTERIOR
ORIENTATION: POSTERIOR
ORIENTATION: POSTERIOR

## 2023-09-09 ASSESSMENT — PAIN DESCRIPTION - LOCATION
LOCATION: BACK

## 2023-09-09 ASSESSMENT — PAIN SCALES - GENERAL
PAINLEVEL_OUTOF10: 6
PAINLEVEL_OUTOF10: 9
PAINLEVEL_OUTOF10: 6
PAINLEVEL_OUTOF10: 7
PAINLEVEL_OUTOF10: 8
PAINLEVEL_OUTOF10: 3
PAINLEVEL_OUTOF10: 8
PAINLEVEL_OUTOF10: 9
PAINLEVEL_OUTOF10: 5

## 2023-09-09 ASSESSMENT — PAIN - FUNCTIONAL ASSESSMENT: PAIN_FUNCTIONAL_ASSESSMENT: ACTIVITIES ARE NOT PREVENTED

## 2023-09-09 ASSESSMENT — PAIN DESCRIPTION - DESCRIPTORS
DESCRIPTORS: ACHING

## 2023-09-09 ASSESSMENT — PAIN DESCRIPTION - PAIN TYPE: TYPE: CHRONIC PAIN

## 2023-09-09 ASSESSMENT — PAIN DESCRIPTION - FREQUENCY: FREQUENCY: CONTINUOUS

## 2023-09-09 ASSESSMENT — PAIN DESCRIPTION - ONSET: ONSET: ON-GOING

## 2023-09-09 NOTE — FLOWSHEET NOTE
09/09/23 0200   Vitals   Pulse 82   Respirations 12   SpO2 97 %   Peritoneal Dialysis Catheter Mid lower abdomen   No placement date or time found. Catheter Location: Mid lower abdomen   Status Deaccessed   Site Condition Clean, dry, intact   Dressing Status Clean, dry & intact   Dressing Gauze   Date of Last Dressing Change 09/08/23   Post-Treatment (Cycler)   Average Dwell Time (Hours:Minutes) 1:23   Lost Dwell Time (Hours:Minutes) 0:06   Effluent Appearance Clear;Yellow   I Drain (mL) 129 mL   PD Output (mL) 924 mL  (924mL   +   NET  (no last fill))     Primary RN SBAR: Evangelina Rueda RN  Comments: No issues with [place] per pt and RN.

## 2023-09-10 ENCOUNTER — APPOINTMENT (OUTPATIENT)
Facility: HOSPITAL | Age: 68
DRG: 853 | End: 2023-09-10
Payer: MEDICARE

## 2023-09-10 LAB
ANION GAP SERPL CALC-SCNC: 12 MMOL/L (ref 5–15)
BUN SERPL-MCNC: 23 MG/DL (ref 6–20)
BUN/CREAT SERPL: 4 (ref 12–20)
CALCIUM SERPL-MCNC: 7.8 MG/DL (ref 8.5–10.1)
CHLORIDE SERPL-SCNC: 101 MMOL/L (ref 97–108)
CO2 SERPL-SCNC: 24 MMOL/L (ref 21–32)
CREAT SERPL-MCNC: 6.51 MG/DL (ref 0.55–1.02)
ERYTHROCYTE [DISTWIDTH] IN BLOOD BY AUTOMATED COUNT: 18.5 % (ref 11.5–14.5)
GLUCOSE BLD STRIP.AUTO-MCNC: 194 MG/DL (ref 65–117)
GLUCOSE BLD STRIP.AUTO-MCNC: 214 MG/DL (ref 65–117)
GLUCOSE BLD STRIP.AUTO-MCNC: 217 MG/DL (ref 65–117)
GLUCOSE BLD STRIP.AUTO-MCNC: 231 MG/DL (ref 65–117)
GLUCOSE SERPL-MCNC: 306 MG/DL (ref 65–100)
HCT VFR BLD AUTO: 23.6 % (ref 35–47)
HGB BLD-MCNC: 7.2 G/DL (ref 11.5–16)
MAGNESIUM SERPL-MCNC: 2 MG/DL (ref 1.6–2.4)
MCH RBC QN AUTO: 30 PG (ref 26–34)
MCHC RBC AUTO-ENTMCNC: 30.5 G/DL (ref 30–36.5)
MCV RBC AUTO: 98.3 FL (ref 80–99)
NRBC # BLD: 0 K/UL (ref 0–0.01)
NRBC BLD-RTO: 0 PER 100 WBC
PLATELET # BLD AUTO: 215 K/UL (ref 150–400)
PMV BLD AUTO: 10 FL (ref 8.9–12.9)
POTASSIUM SERPL-SCNC: 3.5 MMOL/L (ref 3.5–5.1)
RBC # BLD AUTO: 2.4 M/UL (ref 3.8–5.2)
SERVICE CMNT-IMP: ABNORMAL
SODIUM SERPL-SCNC: 137 MMOL/L (ref 136–145)
WBC # BLD AUTO: 10.6 K/UL (ref 3.6–11)

## 2023-09-10 PROCEDURE — 2580000003 HC RX 258

## 2023-09-10 PROCEDURE — 2060000000 HC ICU INTERMEDIATE R&B

## 2023-09-10 PROCEDURE — 83735 ASSAY OF MAGNESIUM: CPT

## 2023-09-10 PROCEDURE — 6370000000 HC RX 637 (ALT 250 FOR IP)

## 2023-09-10 PROCEDURE — 6370000000 HC RX 637 (ALT 250 FOR IP): Performed by: NURSE PRACTITIONER

## 2023-09-10 PROCEDURE — 82962 GLUCOSE BLOOD TEST: CPT

## 2023-09-10 PROCEDURE — 36415 COLL VENOUS BLD VENIPUNCTURE: CPT

## 2023-09-10 PROCEDURE — 80048 BASIC METABOLIC PNL TOTAL CA: CPT

## 2023-09-10 PROCEDURE — 85027 COMPLETE CBC AUTOMATED: CPT

## 2023-09-10 PROCEDURE — 6360000002 HC RX W HCPCS

## 2023-09-10 PROCEDURE — 90945 DIALYSIS ONE EVALUATION: CPT

## 2023-09-10 PROCEDURE — 6370000000 HC RX 637 (ALT 250 FOR IP): Performed by: INTERNAL MEDICINE

## 2023-09-10 PROCEDURE — 71045 X-RAY EXAM CHEST 1 VIEW: CPT

## 2023-09-10 PROCEDURE — 2500000003 HC RX 250 WO HCPCS

## 2023-09-10 PROCEDURE — APPSS45 APP SPLIT SHARED TIME 31-45 MINUTES: Performed by: NURSE PRACTITIONER

## 2023-09-10 RX ORDER — GINSENG 100 MG
CAPSULE ORAL
Status: COMPLETED
Start: 2023-09-10 | End: 2023-09-10

## 2023-09-10 RX ORDER — POTASSIUM CHLORIDE 750 MG/1
20 TABLET, FILM COATED, EXTENDED RELEASE ORAL ONCE
Status: COMPLETED | OUTPATIENT
Start: 2023-09-10 | End: 2023-09-10

## 2023-09-10 RX ORDER — INSULIN GLARGINE 100 [IU]/ML
18 INJECTION, SOLUTION SUBCUTANEOUS NIGHTLY
Status: DISCONTINUED | OUTPATIENT
Start: 2023-09-10 | End: 2023-09-11

## 2023-09-10 RX ORDER — INSULIN LISPRO 100 [IU]/ML
8 INJECTION, SOLUTION INTRAVENOUS; SUBCUTANEOUS
Status: DISCONTINUED | OUTPATIENT
Start: 2023-09-10 | End: 2023-09-12

## 2023-09-10 RX ORDER — INSULIN GLARGINE 100 [IU]/ML
5 INJECTION, SOLUTION SUBCUTANEOUS
Status: COMPLETED | OUTPATIENT
Start: 2023-09-10 | End: 2023-09-10

## 2023-09-10 RX ORDER — GINSENG 100 MG
CAPSULE ORAL PRN
Status: DISCONTINUED | OUTPATIENT
Start: 2023-09-10 | End: 2023-09-14 | Stop reason: HOSPADM

## 2023-09-10 RX ADMIN — ACETAMINOPHEN 975 MG: 325 TABLET ORAL at 17:31

## 2023-09-10 RX ADMIN — SODIUM CHLORIDE, SODIUM LACTATE, CALCIUM CHLORIDE, MAGNESIUM CHLORIDE AND DEXTROSE 6000 ML: 2.5; 538; 448; 18.3; 5.08 INJECTION, SOLUTION INTRAPERITONEAL at 15:16

## 2023-09-10 RX ADMIN — ATORVASTATIN CALCIUM 40 MG: 40 TABLET, FILM COATED ORAL at 20:49

## 2023-09-10 RX ADMIN — ASPIRIN 81 MG CHEWABLE TABLET 81 MG: 81 TABLET CHEWABLE at 09:04

## 2023-09-10 RX ADMIN — SODIUM CHLORIDE, PRESERVATIVE FREE 10 ML: 5 INJECTION INTRAVENOUS at 09:06

## 2023-09-10 RX ADMIN — MUPIROCIN: 20 OINTMENT TOPICAL at 11:30

## 2023-09-10 RX ADMIN — CHLORHEXIDINE GLUCONATE 15 ML: 1.2 RINSE ORAL at 11:30

## 2023-09-10 RX ADMIN — CHLORHEXIDINE GLUCONATE 15 ML: 1.2 RINSE ORAL at 20:48

## 2023-09-10 RX ADMIN — Medication 8 UNITS: at 17:31

## 2023-09-10 RX ADMIN — PANTOPRAZOLE SODIUM 40 MG: 40 TABLET, DELAYED RELEASE ORAL at 09:04

## 2023-09-10 RX ADMIN — DEXMEDETOMIDINE HYDROCHLORIDE 0.3 MCG/KG/HR: 400 INJECTION, SOLUTION INTRAVENOUS at 01:46

## 2023-09-10 RX ADMIN — AMIODARONE HYDROCHLORIDE 400 MG: 200 TABLET ORAL at 09:04

## 2023-09-10 RX ADMIN — POTASSIUM CHLORIDE 20 MEQ: 29.8 INJECTION, SOLUTION INTRAVENOUS at 06:02

## 2023-09-10 RX ADMIN — Medication 1 EACH: at 13:10

## 2023-09-10 RX ADMIN — Medication 6 MG: at 20:49

## 2023-09-10 RX ADMIN — MUPIROCIN: 20 OINTMENT TOPICAL at 20:48

## 2023-09-10 RX ADMIN — Medication 1 UNITS: at 08:33

## 2023-09-10 RX ADMIN — INSULIN GLARGINE 18 UNITS: 100 INJECTION, SOLUTION SUBCUTANEOUS at 21:06

## 2023-09-10 RX ADMIN — Medication 1 UNITS: at 17:31

## 2023-09-10 RX ADMIN — BACITRACIN 1 EACH: 500 OINTMENT TOPICAL at 13:10

## 2023-09-10 RX ADMIN — INSULIN GLARGINE 5 UNITS: 100 INJECTION, SOLUTION SUBCUTANEOUS at 08:32

## 2023-09-10 RX ADMIN — POTASSIUM CHLORIDE 20 MEQ: 750 TABLET, FILM COATED, EXTENDED RELEASE ORAL at 11:29

## 2023-09-10 RX ADMIN — DULOXETINE 20 MG: 20 CAPSULE, DELAYED RELEASE ORAL at 09:05

## 2023-09-10 RX ADMIN — Medication 8 UNITS: at 08:31

## 2023-09-10 RX ADMIN — Medication 8 UNITS: at 12:45

## 2023-09-10 RX ADMIN — DULOXETINE 20 MG: 20 CAPSULE, DELAYED RELEASE ORAL at 20:49

## 2023-09-10 RX ADMIN — Medication 400 MG: at 09:05

## 2023-09-10 RX ADMIN — GENTAMICIN SULFATE: 1 CREAM TOPICAL at 15:15

## 2023-09-10 RX ADMIN — AMIODARONE HYDROCHLORIDE 400 MG: 200 TABLET ORAL at 20:49

## 2023-09-10 RX ADMIN — Medication 400 MG: at 20:49

## 2023-09-10 RX ADMIN — ACETAMINOPHEN 975 MG: 325 TABLET ORAL at 06:03

## 2023-09-10 RX ADMIN — METOPROLOL TARTRATE 12.5 MG: 25 TABLET, FILM COATED ORAL at 20:49

## 2023-09-10 RX ADMIN — ACETAMINOPHEN 975 MG: 325 TABLET ORAL at 12:45

## 2023-09-10 ASSESSMENT — PAIN DESCRIPTION - ORIENTATION
ORIENTATION: POSTERIOR;LOWER
ORIENTATION: LOWER
ORIENTATION: MID

## 2023-09-10 ASSESSMENT — PAIN SCALES - GENERAL
PAINLEVEL_OUTOF10: 6
PAINLEVEL_OUTOF10: 3
PAINLEVEL_OUTOF10: 6
PAINLEVEL_OUTOF10: 3
PAINLEVEL_OUTOF10: 3

## 2023-09-10 ASSESSMENT — PAIN DESCRIPTION - LOCATION
LOCATION: BACK

## 2023-09-10 ASSESSMENT — PAIN DESCRIPTION - PAIN TYPE
TYPE: CHRONIC PAIN

## 2023-09-10 ASSESSMENT — PAIN - FUNCTIONAL ASSESSMENT
PAIN_FUNCTIONAL_ASSESSMENT: ACTIVITIES ARE NOT PREVENTED

## 2023-09-10 ASSESSMENT — PAIN DESCRIPTION - DESCRIPTORS
DESCRIPTORS: ACHING

## 2023-09-10 ASSESSMENT — PAIN DESCRIPTION - FREQUENCY
FREQUENCY: CONTINUOUS

## 2023-09-10 ASSESSMENT — PAIN DESCRIPTION - ONSET
ONSET: ON-GOING

## 2023-09-10 NOTE — FLOWSHEET NOTE
Peritoneal Dialysis Disconnection / [phone]    Primary RN SBAR: Santana Ames RN  Patient Education: access/site care    Hepatitis B Surface Ag   Date/Time Value Ref Range Status   08/25/2023 12:35 AM <0.10 Index Final     Hep B S Ab   Date/Time Value Ref Range Status   08/20/2023 10:01 AM <3.10 mIU/mL Final       09/10/23 0219   Peritoneal Dialysis Catheter Mid lower abdomen   No placement date or time found. Catheter Location: Mid lower abdomen   Status Deaccessed   Site Condition   (BRIAN - drsg C/D/I)   Dressing Status Clean, dry & intact   Dressing Gauze   Date of Last Dressing Change 09/09/23   Dialysis Type Continuous cycling   Catheter Care Given Yes   Post-Treatment (Cycler)   Average Dwell Time (Hours:Minutes) 1:22   Lost Dwell Time (Hours:Minutes) 0:13   Effluent Appearance Clear;Yellow   PD Output (mL) 1113 mL  ( ml +  ml = 1113 NET UF)     Samara Chiang RN at bedside to disconnect pt from CCPD tx. Orders, consent, pt, and code status confirmed. Tx completed. Used cassette and bags discarded. Education and pre/post report given to primary RN.     Net Fluid Loss: 1113 ml    Primary RN SBAR: Angelina Anaya

## 2023-09-11 ENCOUNTER — APPOINTMENT (OUTPATIENT)
Facility: HOSPITAL | Age: 68
DRG: 853 | End: 2023-09-11
Payer: MEDICARE

## 2023-09-11 LAB
ANION GAP SERPL CALC-SCNC: 9 MMOL/L (ref 5–15)
BASOPHILS # BLD: 0.1 K/UL (ref 0–0.1)
BASOPHILS NFR BLD: 1 % (ref 0–1)
BUN SERPL-MCNC: 29 MG/DL (ref 6–20)
BUN/CREAT SERPL: 4 (ref 12–20)
CALCIUM SERPL-MCNC: 8 MG/DL (ref 8.5–10.1)
CHLORIDE SERPL-SCNC: 104 MMOL/L (ref 97–108)
CO2 SERPL-SCNC: 25 MMOL/L (ref 21–32)
CREAT SERPL-MCNC: 6.93 MG/DL (ref 0.55–1.02)
DIFFERENTIAL METHOD BLD: ABNORMAL
EOSINOPHIL # BLD: 0.2 K/UL (ref 0–0.4)
EOSINOPHIL NFR BLD: 3 % (ref 0–7)
ERYTHROCYTE [DISTWIDTH] IN BLOOD BY AUTOMATED COUNT: 18.6 % (ref 11.5–14.5)
GLUCOSE BLD STRIP.AUTO-MCNC: 170 MG/DL (ref 65–117)
GLUCOSE BLD STRIP.AUTO-MCNC: 86 MG/DL (ref 65–117)
GLUCOSE BLD STRIP.AUTO-MCNC: 90 MG/DL (ref 65–117)
GLUCOSE SERPL-MCNC: 209 MG/DL (ref 65–100)
HCT VFR BLD AUTO: 29 % (ref 35–47)
HGB BLD-MCNC: 8.7 G/DL (ref 11.5–16)
IMM GRANULOCYTES # BLD AUTO: 0 K/UL (ref 0–0.04)
IMM GRANULOCYTES NFR BLD AUTO: 0 % (ref 0–0.5)
LYMPHOCYTES # BLD: 1.1 K/UL (ref 0.8–3.5)
LYMPHOCYTES NFR BLD: 12 % (ref 12–49)
MAGNESIUM SERPL-MCNC: 2.4 MG/DL (ref 1.6–2.4)
MCH RBC QN AUTO: 30 PG (ref 26–34)
MCHC RBC AUTO-ENTMCNC: 30 G/DL (ref 30–36.5)
MCV RBC AUTO: 100 FL (ref 80–99)
MONOCYTES # BLD: 1.1 K/UL (ref 0–1)
MONOCYTES NFR BLD: 12 % (ref 5–13)
NEUTS SEG # BLD: 6.4 K/UL (ref 1.8–8)
NEUTS SEG NFR BLD: 72 % (ref 32–75)
NRBC # BLD: 0 K/UL (ref 0–0.01)
NRBC BLD-RTO: 0 PER 100 WBC
PLATELET # BLD AUTO: 248 K/UL (ref 150–400)
PMV BLD AUTO: 10 FL (ref 8.9–12.9)
POTASSIUM SERPL-SCNC: 4.1 MMOL/L (ref 3.5–5.1)
RBC # BLD AUTO: 2.9 M/UL (ref 3.8–5.2)
SERVICE CMNT-IMP: ABNORMAL
SERVICE CMNT-IMP: NORMAL
SERVICE CMNT-IMP: NORMAL
SODIUM SERPL-SCNC: 138 MMOL/L (ref 136–145)
WBC # BLD AUTO: 8.9 K/UL (ref 3.6–11)

## 2023-09-11 PROCEDURE — 82962 GLUCOSE BLOOD TEST: CPT

## 2023-09-11 PROCEDURE — 2580000003 HC RX 258

## 2023-09-11 PROCEDURE — 97116 GAIT TRAINING THERAPY: CPT

## 2023-09-11 PROCEDURE — 80048 BASIC METABOLIC PNL TOTAL CA: CPT

## 2023-09-11 PROCEDURE — 97530 THERAPEUTIC ACTIVITIES: CPT

## 2023-09-11 PROCEDURE — 2060000000 HC ICU INTERMEDIATE R&B

## 2023-09-11 PROCEDURE — 6360000002 HC RX W HCPCS

## 2023-09-11 PROCEDURE — 97535 SELF CARE MNGMENT TRAINING: CPT

## 2023-09-11 PROCEDURE — 85025 COMPLETE CBC W/AUTO DIFF WBC: CPT

## 2023-09-11 PROCEDURE — 71045 X-RAY EXAM CHEST 1 VIEW: CPT

## 2023-09-11 PROCEDURE — 83735 ASSAY OF MAGNESIUM: CPT

## 2023-09-11 PROCEDURE — 6370000000 HC RX 637 (ALT 250 FOR IP): Performed by: NURSE PRACTITIONER

## 2023-09-11 PROCEDURE — C8924 2D TTE W OR W/O FOL W/CON,FU: HCPCS

## 2023-09-11 PROCEDURE — 6370000000 HC RX 637 (ALT 250 FOR IP)

## 2023-09-11 PROCEDURE — 90945 DIALYSIS ONE EVALUATION: CPT

## 2023-09-11 PROCEDURE — 6360000004 HC RX CONTRAST MEDICATION: Performed by: THORACIC SURGERY (CARDIOTHORACIC VASCULAR SURGERY)

## 2023-09-11 PROCEDURE — 2580000003 HC RX 258: Performed by: INTERNAL MEDICINE

## 2023-09-11 PROCEDURE — 6360000002 HC RX W HCPCS: Performed by: INTERNAL MEDICINE

## 2023-09-11 PROCEDURE — 36415 COLL VENOUS BLD VENIPUNCTURE: CPT

## 2023-09-11 RX ORDER — GINSENG 100 MG
CAPSULE ORAL ONCE
Status: COMPLETED | OUTPATIENT
Start: 2023-09-11 | End: 2023-09-11

## 2023-09-11 RX ORDER — INSULIN GLARGINE 100 [IU]/ML
24 INJECTION, SOLUTION SUBCUTANEOUS NIGHTLY
Status: DISCONTINUED | OUTPATIENT
Start: 2023-09-11 | End: 2023-09-13

## 2023-09-11 RX ADMIN — PERFLUTREN 1.5 ML: 6.52 INJECTION, SUSPENSION INTRAVENOUS at 13:49

## 2023-09-11 RX ADMIN — BACITRACIN 1 EACH: 500 OINTMENT TOPICAL at 13:01

## 2023-09-11 RX ADMIN — Medication 400 MG: at 21:13

## 2023-09-11 RX ADMIN — MUPIROCIN: 20 OINTMENT TOPICAL at 21:11

## 2023-09-11 RX ADMIN — SODIUM CHLORIDE, PRESERVATIVE FREE 10 ML: 5 INJECTION INTRAVENOUS at 21:10

## 2023-09-11 RX ADMIN — INSULIN GLARGINE 24 UNITS: 100 INJECTION, SOLUTION SUBCUTANEOUS at 21:13

## 2023-09-11 RX ADMIN — METOPROLOL TARTRATE 12.5 MG: 25 TABLET, FILM COATED ORAL at 21:12

## 2023-09-11 RX ADMIN — OXYCODONE HYDROCHLORIDE 10 MG: 5 TABLET ORAL at 08:58

## 2023-09-11 RX ADMIN — SODIUM CHLORIDE, PRESERVATIVE FREE 10 ML: 5 INJECTION INTRAVENOUS at 08:59

## 2023-09-11 RX ADMIN — DULOXETINE 20 MG: 20 CAPSULE, DELAYED RELEASE ORAL at 21:13

## 2023-09-11 RX ADMIN — OXYCODONE HYDROCHLORIDE 10 MG: 5 TABLET ORAL at 21:12

## 2023-09-11 RX ADMIN — EPOETIN ALFA-EPBX 20000 UNITS: 20000 INJECTION, SOLUTION INTRAVENOUS; SUBCUTANEOUS at 13:01

## 2023-09-11 RX ADMIN — METOPROLOL TARTRATE 12.5 MG: 25 TABLET, FILM COATED ORAL at 08:48

## 2023-09-11 RX ADMIN — PANTOPRAZOLE SODIUM 40 MG: 40 TABLET, DELAYED RELEASE ORAL at 08:48

## 2023-09-11 RX ADMIN — CHLORHEXIDINE GLUCONATE 15 ML: 1.2 RINSE ORAL at 21:11

## 2023-09-11 RX ADMIN — DULOXETINE 20 MG: 20 CAPSULE, DELAYED RELEASE ORAL at 08:48

## 2023-09-11 RX ADMIN — MUPIROCIN: 20 OINTMENT TOPICAL at 08:59

## 2023-09-11 RX ADMIN — AMIODARONE HYDROCHLORIDE 400 MG: 200 TABLET ORAL at 08:48

## 2023-09-11 RX ADMIN — HEPARIN SODIUM: 1000 INJECTION INTRAVENOUS; SUBCUTANEOUS at 20:24

## 2023-09-11 RX ADMIN — Medication 6 MG: at 21:12

## 2023-09-11 RX ADMIN — CHLORHEXIDINE GLUCONATE 15 ML: 1.2 RINSE ORAL at 08:59

## 2023-09-11 RX ADMIN — OXYCODONE HYDROCHLORIDE 10 MG: 5 TABLET ORAL at 16:49

## 2023-09-11 RX ADMIN — ACETAMINOPHEN 975 MG: 325 TABLET ORAL at 13:01

## 2023-09-11 RX ADMIN — ATORVASTATIN CALCIUM 40 MG: 40 TABLET, FILM COATED ORAL at 21:13

## 2023-09-11 RX ADMIN — ASPIRIN 81 MG CHEWABLE TABLET 81 MG: 81 TABLET CHEWABLE at 08:48

## 2023-09-11 RX ADMIN — Medication 8 UNITS: at 08:48

## 2023-09-11 RX ADMIN — AMIODARONE HYDROCHLORIDE 400 MG: 200 TABLET ORAL at 21:12

## 2023-09-11 RX ADMIN — Medication 400 MG: at 08:48

## 2023-09-11 ASSESSMENT — PAIN SCALES - GENERAL
PAINLEVEL_OUTOF10: 7
PAINLEVEL_OUTOF10: 9
PAINLEVEL_OUTOF10: 7
PAINLEVEL_OUTOF10: 4
PAINLEVEL_OUTOF10: 3
PAINLEVEL_OUTOF10: 4

## 2023-09-11 ASSESSMENT — PAIN DESCRIPTION - ORIENTATION: ORIENTATION: ANTERIOR

## 2023-09-11 ASSESSMENT — PAIN DESCRIPTION - LOCATION
LOCATION: BACK
LOCATION: CHEST;INCISION
LOCATION: CHEST

## 2023-09-11 ASSESSMENT — PAIN DESCRIPTION - DESCRIPTORS: DESCRIPTORS: ACHING

## 2023-09-11 ASSESSMENT — PAIN DESCRIPTION - PAIN TYPE: TYPE: SURGICAL PAIN

## 2023-09-11 ASSESSMENT — PAIN - FUNCTIONAL ASSESSMENT: PAIN_FUNCTIONAL_ASSESSMENT: ACTIVITIES ARE NOT PREVENTED

## 2023-09-11 NOTE — FLOWSHEET NOTE
PD DISCONNECT    09/11/23 0645   Observations & Evaluations   Level of Consciousness 0   Heart Rhythm   (bedside telemetry)   O2 Device None (Room air)   Skin Condition/Temp Warm;Dry   Abdomen Inspection Soft   Vital Signs   Pulse 96   Respirations (!) 7   SpO2 93 %   Technical Checks   ICEBOAT I;C;E;B;O;A;T        09/11/23 0645   Vitals   Pulse 96   Respirations (!) 7   SpO2 93 %   Peritoneal Dialysis Catheter Mid lower abdomen   No placement date or time found. Catheter Location: Mid lower abdomen   Dressing Status Clean, dry & intact   Dressing Gauze   Post-Treatment (Cycler)   Average Dwell Time (Hours:Minutes) 1:18   Lost Dwell Time (Hours:Minutes) 0:32   Effluent Appearance Fibrin;Clear;Yellow   PD Output (mL) 1298 mL       Comments: treatment completed, no recorded alarms, one minute alcavis scrub followed by one minute soak and sterile mini cap, noted fibrin cloud in toilet will discuss with nephrology.

## 2023-09-12 ENCOUNTER — APPOINTMENT (OUTPATIENT)
Facility: HOSPITAL | Age: 68
DRG: 853 | End: 2023-09-12
Payer: MEDICARE

## 2023-09-12 LAB
ANION GAP SERPL CALC-SCNC: 11 MMOL/L (ref 5–15)
BASOPHILS # BLD: 0.1 K/UL (ref 0–0.1)
BASOPHILS NFR BLD: 1 % (ref 0–1)
BUN SERPL-MCNC: 31 MG/DL (ref 6–20)
BUN/CREAT SERPL: 4 (ref 12–20)
CALCIUM SERPL-MCNC: 7.9 MG/DL (ref 8.5–10.1)
CHLORIDE SERPL-SCNC: 103 MMOL/L (ref 97–108)
CO2 SERPL-SCNC: 25 MMOL/L (ref 21–32)
CREAT SERPL-MCNC: 7.17 MG/DL (ref 0.55–1.02)
DIFFERENTIAL METHOD BLD: ABNORMAL
ECHO BSA: 1.92 M2
ECHO LV EDV A2C: 125 ML
ECHO LV EDV A4C: 107 ML
ECHO LV EDV BP: 119 ML (ref 56–104)
ECHO LV EDV INDEX A4C: 57 ML/M2
ECHO LV EDV INDEX BP: 63 ML/M2
ECHO LV EDV NDEX A2C: 66 ML/M2
ECHO LV EJECTION FRACTION A2C: 66 %
ECHO LV EJECTION FRACTION A4C: 45 %
ECHO LV EJECTION FRACTION BIPLANE: 56 % (ref 55–100)
ECHO LV ESV A2C: 43 ML
ECHO LV ESV A4C: 59 ML
ECHO LV ESV BP: 52 ML (ref 19–49)
ECHO LV ESV INDEX A2C: 23 ML/M2
ECHO LV ESV INDEX A4C: 31 ML/M2
ECHO LV ESV INDEX BP: 28 ML/M2
EOSINOPHIL # BLD: 0.4 K/UL (ref 0–0.4)
EOSINOPHIL NFR BLD: 6 % (ref 0–7)
ERYTHROCYTE [DISTWIDTH] IN BLOOD BY AUTOMATED COUNT: 18.5 % (ref 11.5–14.5)
GLUCOSE BLD STRIP.AUTO-MCNC: 108 MG/DL (ref 65–117)
GLUCOSE BLD STRIP.AUTO-MCNC: 138 MG/DL (ref 65–117)
GLUCOSE BLD STRIP.AUTO-MCNC: 152 MG/DL (ref 65–117)
GLUCOSE BLD STRIP.AUTO-MCNC: 164 MG/DL (ref 65–117)
GLUCOSE BLD STRIP.AUTO-MCNC: 193 MG/DL (ref 65–117)
GLUCOSE BLD STRIP.AUTO-MCNC: 204 MG/DL (ref 65–117)
GLUCOSE SERPL-MCNC: 194 MG/DL (ref 65–100)
HCT VFR BLD AUTO: 25.4 % (ref 35–47)
HEMOCCULT STL QL: NEGATIVE
HGB BLD-MCNC: 7.4 G/DL (ref 11.5–16)
IMM GRANULOCYTES # BLD AUTO: 0 K/UL (ref 0–0.04)
IMM GRANULOCYTES NFR BLD AUTO: 1 % (ref 0–0.5)
LYMPHOCYTES # BLD: 1.4 K/UL (ref 0.8–3.5)
LYMPHOCYTES NFR BLD: 21 % (ref 12–49)
MAGNESIUM SERPL-MCNC: 2.5 MG/DL (ref 1.6–2.4)
MCH RBC QN AUTO: 28.7 PG (ref 26–34)
MCHC RBC AUTO-ENTMCNC: 29.1 G/DL (ref 30–36.5)
MCV RBC AUTO: 98.4 FL (ref 80–99)
MONOCYTES # BLD: 1 K/UL (ref 0–1)
MONOCYTES NFR BLD: 15 % (ref 5–13)
NEUTS SEG # BLD: 3.8 K/UL (ref 1.8–8)
NEUTS SEG NFR BLD: 56 % (ref 32–75)
NRBC # BLD: 0 K/UL (ref 0–0.01)
NRBC BLD-RTO: 0 PER 100 WBC
PLATELET # BLD AUTO: 288 K/UL (ref 150–400)
PMV BLD AUTO: 10.3 FL (ref 8.9–12.9)
POTASSIUM SERPL-SCNC: 3.9 MMOL/L (ref 3.5–5.1)
RBC # BLD AUTO: 2.58 M/UL (ref 3.8–5.2)
SERVICE CMNT-IMP: ABNORMAL
SERVICE CMNT-IMP: NORMAL
SODIUM SERPL-SCNC: 139 MMOL/L (ref 136–145)
WBC # BLD AUTO: 6.6 K/UL (ref 3.6–11)

## 2023-09-12 PROCEDURE — 97535 SELF CARE MNGMENT TRAINING: CPT

## 2023-09-12 PROCEDURE — 6370000000 HC RX 637 (ALT 250 FOR IP)

## 2023-09-12 PROCEDURE — 82272 OCCULT BLD FECES 1-3 TESTS: CPT

## 2023-09-12 PROCEDURE — 97530 THERAPEUTIC ACTIVITIES: CPT

## 2023-09-12 PROCEDURE — 97116 GAIT TRAINING THERAPY: CPT

## 2023-09-12 PROCEDURE — 36415 COLL VENOUS BLD VENIPUNCTURE: CPT

## 2023-09-12 PROCEDURE — 99231 SBSQ HOSP IP/OBS SF/LOW 25: CPT

## 2023-09-12 PROCEDURE — 80048 BASIC METABOLIC PNL TOTAL CA: CPT

## 2023-09-12 PROCEDURE — 6360000002 HC RX W HCPCS: Performed by: INTERNAL MEDICINE

## 2023-09-12 PROCEDURE — 85025 COMPLETE CBC W/AUTO DIFF WBC: CPT

## 2023-09-12 PROCEDURE — 2580000003 HC RX 258

## 2023-09-12 PROCEDURE — 71046 X-RAY EXAM CHEST 2 VIEWS: CPT

## 2023-09-12 PROCEDURE — 6370000000 HC RX 637 (ALT 250 FOR IP): Performed by: NURSE PRACTITIONER

## 2023-09-12 PROCEDURE — 2580000003 HC RX 258: Performed by: INTERNAL MEDICINE

## 2023-09-12 PROCEDURE — 2060000000 HC ICU INTERMEDIATE R&B

## 2023-09-12 PROCEDURE — 83735 ASSAY OF MAGNESIUM: CPT

## 2023-09-12 RX ORDER — INSULIN LISPRO 100 [IU]/ML
5 INJECTION, SOLUTION INTRAVENOUS; SUBCUTANEOUS
Status: DISCONTINUED | OUTPATIENT
Start: 2023-09-12 | End: 2023-09-12

## 2023-09-12 RX ORDER — ROSUVASTATIN CALCIUM 40 MG/1
40 TABLET, COATED ORAL NIGHTLY
Status: DISCONTINUED | OUTPATIENT
Start: 2023-09-12 | End: 2023-09-14 | Stop reason: HOSPADM

## 2023-09-12 RX ORDER — INSULIN LISPRO 100 [IU]/ML
4 INJECTION, SOLUTION INTRAVENOUS; SUBCUTANEOUS
Status: DISCONTINUED | OUTPATIENT
Start: 2023-09-12 | End: 2023-09-14 | Stop reason: HOSPADM

## 2023-09-12 RX ADMIN — DULOXETINE 20 MG: 20 CAPSULE, DELAYED RELEASE ORAL at 20:45

## 2023-09-12 RX ADMIN — PANTOPRAZOLE SODIUM 40 MG: 40 TABLET, DELAYED RELEASE ORAL at 08:38

## 2023-09-12 RX ADMIN — DULOXETINE 20 MG: 20 CAPSULE, DELAYED RELEASE ORAL at 08:38

## 2023-09-12 RX ADMIN — ACETAMINOPHEN 975 MG: 325 TABLET ORAL at 07:27

## 2023-09-12 RX ADMIN — ACETAMINOPHEN 975 MG: 325 TABLET ORAL at 00:22

## 2023-09-12 RX ADMIN — AMIODARONE HYDROCHLORIDE 400 MG: 200 TABLET ORAL at 08:38

## 2023-09-12 RX ADMIN — Medication 4 UNITS: at 08:38

## 2023-09-12 RX ADMIN — ASPIRIN 81 MG CHEWABLE TABLET 81 MG: 81 TABLET CHEWABLE at 08:38

## 2023-09-12 RX ADMIN — ROSUVASTATIN 40 MG: 40 TABLET, FILM COATED ORAL at 20:45

## 2023-09-12 RX ADMIN — CHLORHEXIDINE GLUCONATE 15 ML: 1.2 RINSE ORAL at 08:39

## 2023-09-12 RX ADMIN — AMIODARONE HYDROCHLORIDE 400 MG: 200 TABLET ORAL at 20:45

## 2023-09-12 RX ADMIN — SODIUM CHLORIDE, PRESERVATIVE FREE 10 ML: 5 INJECTION INTRAVENOUS at 20:48

## 2023-09-12 RX ADMIN — OXYCODONE HYDROCHLORIDE 10 MG: 5 TABLET ORAL at 00:22

## 2023-09-12 RX ADMIN — INSULIN GLARGINE 24 UNITS: 100 INJECTION, SOLUTION SUBCUTANEOUS at 20:47

## 2023-09-12 RX ADMIN — Medication 4 UNITS: at 12:51

## 2023-09-12 RX ADMIN — HEPARIN SODIUM: 1000 INJECTION INTRAVENOUS; SUBCUTANEOUS at 17:20

## 2023-09-12 RX ADMIN — METOPROLOL TARTRATE 12.5 MG: 25 TABLET, FILM COATED ORAL at 08:38

## 2023-09-12 RX ADMIN — CHLORHEXIDINE GLUCONATE 15 ML: 1.2 RINSE ORAL at 20:47

## 2023-09-12 RX ADMIN — GENTAMICIN SULFATE: 1 CREAM TOPICAL at 17:19

## 2023-09-12 RX ADMIN — Medication 400 MG: at 20:45

## 2023-09-12 RX ADMIN — METOPROLOL TARTRATE 12.5 MG: 25 TABLET, FILM COATED ORAL at 20:45

## 2023-09-12 RX ADMIN — SODIUM CHLORIDE, PRESERVATIVE FREE 10 ML: 5 INJECTION INTRAVENOUS at 08:44

## 2023-09-12 RX ADMIN — Medication 4 UNITS: at 18:02

## 2023-09-12 RX ADMIN — ACETAMINOPHEN 975 MG: 325 TABLET ORAL at 12:51

## 2023-09-12 RX ADMIN — Medication 400 MG: at 08:38

## 2023-09-12 RX ADMIN — HEPARIN SODIUM: 1000 INJECTION INTRAVENOUS; SUBCUTANEOUS at 17:19

## 2023-09-12 ASSESSMENT — PAIN DESCRIPTION - FREQUENCY: FREQUENCY: CONTINUOUS

## 2023-09-12 ASSESSMENT — PAIN - FUNCTIONAL ASSESSMENT: PAIN_FUNCTIONAL_ASSESSMENT: PREVENTS OR INTERFERES SOME ACTIVE ACTIVITIES AND ADLS

## 2023-09-12 ASSESSMENT — PAIN SCALES - GENERAL
PAINLEVEL_OUTOF10: 4
PAINLEVEL_OUTOF10: 10
PAINLEVEL_OUTOF10: 5

## 2023-09-12 ASSESSMENT — PAIN DESCRIPTION - PAIN TYPE: TYPE: SURGICAL PAIN

## 2023-09-12 ASSESSMENT — PAIN DESCRIPTION - LOCATION
LOCATION: CHEST
LOCATION: BACK

## 2023-09-12 ASSESSMENT — PAIN DESCRIPTION - ORIENTATION: ORIENTATION: ANTERIOR

## 2023-09-12 ASSESSMENT — PAIN DESCRIPTION - DESCRIPTORS: DESCRIPTORS: ACHING

## 2023-09-12 NOTE — PROCEDURES
Patient has not used pacing wires past 24 hours, HR stable after initiation of metoprolol. Skin cleaned with chlorohexidine swab, sutures removed. A wires removed first, came out easily. V wires removed next, came out with no resistance. Patient tolerated well, hemodynamically stable at end of procedure.  Nursing aware will need to monitor over next 4 hours due to wire removal.     Lizy Sher, AGAP-BC

## 2023-09-12 NOTE — CARE COORDINATION
Transition of Care Plan:    RUR: 22%-High  Prior Level of Functioning: Independent  Disposition: IPR  If SNF or IPR: Date FOC offered: 9/12/23  Barriers to discharge: Will need authorization for admit to rehab. Once accepted will have facility start process.   Referral sent to [facility] via [address] on 9/12/23

## 2023-09-12 NOTE — FLOWSHEET NOTE
PD DISCONNECT    09/12/23 0656   Peritoneal Dialysis Catheter Mid lower abdomen   No placement date or time found. Catheter Location: Mid lower abdomen   Dressing Status Clean, dry & intact   Dressing Gauze   Date of Last Dressing Change 09/11/23   Post-Treatment (Cycler)   Average Dwell Time (Hours:Minutes) 1:20   Lost Dwell Time (Hours:Minutes) 0:23   Effluent Appearance Clear;Yellow;Fibrin   PD Output (mL) 1390 mL  ( + UF 1261 = NET UF 1390 (no last fill))        09/12/23 0700   Observations & Evaluations   Level of Consciousness 0   Respiratory Quality/Effort Unlabored   O2 Device None (Room air)   Skin Condition/Temp Warm;Dry   Abdomen Inspection Soft   RLE Edema Trace   LLE Edema Trace   Vital Signs   Pulse 88   Respirations 16   SpO2 98 %   Technical Checks   ICEBOAT I;C;E;B;O;A;T     Primary RN SBAR: PERI Osei RN   Comments: treatment completed, no recorded alarms, one minute alcavis scrub followed by one minute soak and sterile minicap. Primary nurse at bedside upon departure.

## 2023-09-13 ENCOUNTER — APPOINTMENT (OUTPATIENT)
Facility: HOSPITAL | Age: 68
DRG: 853 | End: 2023-09-13
Payer: MEDICARE

## 2023-09-13 LAB
ANION GAP SERPL CALC-SCNC: 8 MMOL/L (ref 5–15)
BASOPHILS # BLD: 0.1 K/UL (ref 0–0.1)
BASOPHILS NFR BLD: 1 % (ref 0–1)
BUN SERPL-MCNC: 33 MG/DL (ref 6–20)
BUN/CREAT SERPL: 5 (ref 12–20)
CALCIUM SERPL-MCNC: 8 MG/DL (ref 8.5–10.1)
CHLORIDE SERPL-SCNC: 103 MMOL/L (ref 97–108)
CO2 SERPL-SCNC: 26 MMOL/L (ref 21–32)
CREAT SERPL-MCNC: 7.23 MG/DL (ref 0.55–1.02)
DIFFERENTIAL METHOD BLD: ABNORMAL
EOSINOPHIL # BLD: 0.3 K/UL (ref 0–0.4)
EOSINOPHIL NFR BLD: 5 % (ref 0–7)
ERYTHROCYTE [DISTWIDTH] IN BLOOD BY AUTOMATED COUNT: 18.3 % (ref 11.5–14.5)
GLUCOSE BLD STRIP.AUTO-MCNC: 139 MG/DL (ref 65–117)
GLUCOSE BLD STRIP.AUTO-MCNC: 80 MG/DL (ref 65–117)
GLUCOSE SERPL-MCNC: 209 MG/DL (ref 65–100)
HCT VFR BLD AUTO: 26.5 % (ref 35–47)
HGB BLD-MCNC: 7.8 G/DL (ref 11.5–16)
IMM GRANULOCYTES # BLD AUTO: 0 K/UL (ref 0–0.04)
IMM GRANULOCYTES NFR BLD AUTO: 1 % (ref 0–0.5)
LYMPHOCYTES # BLD: 1.1 K/UL (ref 0.8–3.5)
LYMPHOCYTES NFR BLD: 18 % (ref 12–49)
MAGNESIUM SERPL-MCNC: 2.6 MG/DL (ref 1.6–2.4)
MCH RBC QN AUTO: 29.1 PG (ref 26–34)
MCHC RBC AUTO-ENTMCNC: 29.4 G/DL (ref 30–36.5)
MCV RBC AUTO: 98.9 FL (ref 80–99)
MONOCYTES # BLD: 1 K/UL (ref 0–1)
MONOCYTES NFR BLD: 16 % (ref 5–13)
NEUTS SEG # BLD: 3.7 K/UL (ref 1.8–8)
NEUTS SEG NFR BLD: 59 % (ref 32–75)
NRBC # BLD: 0.03 K/UL (ref 0–0.01)
NRBC BLD-RTO: 0.5 PER 100 WBC
PLATELET # BLD AUTO: 349 K/UL (ref 150–400)
PMV BLD AUTO: 10.2 FL (ref 8.9–12.9)
POTASSIUM SERPL-SCNC: 4 MMOL/L (ref 3.5–5.1)
RBC # BLD AUTO: 2.68 M/UL (ref 3.8–5.2)
SERVICE CMNT-IMP: ABNORMAL
SERVICE CMNT-IMP: NORMAL
SODIUM SERPL-SCNC: 137 MMOL/L (ref 136–145)
WBC # BLD AUTO: 6.2 K/UL (ref 3.6–11)

## 2023-09-13 PROCEDURE — 36415 COLL VENOUS BLD VENIPUNCTURE: CPT

## 2023-09-13 PROCEDURE — 2060000000 HC ICU INTERMEDIATE R&B

## 2023-09-13 PROCEDURE — 97530 THERAPEUTIC ACTIVITIES: CPT

## 2023-09-13 PROCEDURE — 71045 X-RAY EXAM CHEST 1 VIEW: CPT

## 2023-09-13 PROCEDURE — 80048 BASIC METABOLIC PNL TOTAL CA: CPT

## 2023-09-13 PROCEDURE — 2580000003 HC RX 258: Performed by: INTERNAL MEDICINE

## 2023-09-13 PROCEDURE — 6370000000 HC RX 637 (ALT 250 FOR IP)

## 2023-09-13 PROCEDURE — 6360000002 HC RX W HCPCS

## 2023-09-13 PROCEDURE — 83735 ASSAY OF MAGNESIUM: CPT

## 2023-09-13 PROCEDURE — 6370000000 HC RX 637 (ALT 250 FOR IP): Performed by: NURSE PRACTITIONER

## 2023-09-13 PROCEDURE — 82962 GLUCOSE BLOOD TEST: CPT

## 2023-09-13 PROCEDURE — 2580000003 HC RX 258

## 2023-09-13 PROCEDURE — 85025 COMPLETE CBC W/AUTO DIFF WBC: CPT

## 2023-09-13 PROCEDURE — 97116 GAIT TRAINING THERAPY: CPT

## 2023-09-13 PROCEDURE — 90945 DIALYSIS ONE EVALUATION: CPT

## 2023-09-13 PROCEDURE — 6360000002 HC RX W HCPCS: Performed by: INTERNAL MEDICINE

## 2023-09-13 RX ORDER — CARVEDILOL 12.5 MG/1
12.5 TABLET ORAL 2 TIMES DAILY WITH MEALS
Status: DISCONTINUED | OUTPATIENT
Start: 2023-09-13 | End: 2023-09-14

## 2023-09-13 RX ORDER — INSULIN GLARGINE 100 [IU]/ML
26 INJECTION, SOLUTION SUBCUTANEOUS NIGHTLY
Status: DISCONTINUED | OUTPATIENT
Start: 2023-09-13 | End: 2023-09-14 | Stop reason: HOSPADM

## 2023-09-13 RX ADMIN — CARVEDILOL 12.5 MG: 12.5 TABLET, FILM COATED ORAL at 17:41

## 2023-09-13 RX ADMIN — CARVEDILOL 12.5 MG: 12.5 TABLET, FILM COATED ORAL at 12:07

## 2023-09-13 RX ADMIN — CHLORHEXIDINE GLUCONATE 15 ML: 1.2 RINSE ORAL at 22:39

## 2023-09-13 RX ADMIN — SENNOSIDES AND DOCUSATE SODIUM 1 TABLET: 50; 8.6 TABLET ORAL at 09:12

## 2023-09-13 RX ADMIN — Medication 4 UNITS: at 09:13

## 2023-09-13 RX ADMIN — DULOXETINE 20 MG: 20 CAPSULE, DELAYED RELEASE ORAL at 09:12

## 2023-09-13 RX ADMIN — Medication 4 UNITS: at 12:07

## 2023-09-13 RX ADMIN — Medication 400 MG: at 09:12

## 2023-09-13 RX ADMIN — ROSUVASTATIN 40 MG: 40 TABLET, FILM COATED ORAL at 22:38

## 2023-09-13 RX ADMIN — POLYETHYLENE GLYCOL 3350 17 G: 17 POWDER, FOR SOLUTION ORAL at 09:12

## 2023-09-13 RX ADMIN — AMIODARONE HYDROCHLORIDE 400 MG: 200 TABLET ORAL at 22:38

## 2023-09-13 RX ADMIN — EPOETIN ALFA-EPBX 20000 UNITS: 20000 INJECTION, SOLUTION INTRAVENOUS; SUBCUTANEOUS at 09:12

## 2023-09-13 RX ADMIN — ACETAMINOPHEN 975 MG: 325 TABLET ORAL at 12:07

## 2023-09-13 RX ADMIN — ACETAMINOPHEN 975 MG: 325 TABLET ORAL at 06:59

## 2023-09-13 RX ADMIN — SENNOSIDES AND DOCUSATE SODIUM 1 TABLET: 50; 8.6 TABLET ORAL at 22:39

## 2023-09-13 RX ADMIN — Medication 4 UNITS: at 17:41

## 2023-09-13 RX ADMIN — SODIUM CHLORIDE, PRESERVATIVE FREE 10 ML: 5 INJECTION INTRAVENOUS at 22:41

## 2023-09-13 RX ADMIN — ONDANSETRON 4 MG: 2 INJECTION INTRAMUSCULAR; INTRAVENOUS at 17:18

## 2023-09-13 RX ADMIN — Medication 6 MG: at 22:39

## 2023-09-13 RX ADMIN — ASPIRIN 81 MG CHEWABLE TABLET 81 MG: 81 TABLET CHEWABLE at 09:12

## 2023-09-13 RX ADMIN — DULOXETINE 20 MG: 20 CAPSULE, DELAYED RELEASE ORAL at 22:39

## 2023-09-13 RX ADMIN — Medication 1 UNITS: at 09:13

## 2023-09-13 RX ADMIN — PANTOPRAZOLE SODIUM 40 MG: 40 TABLET, DELAYED RELEASE ORAL at 09:12

## 2023-09-13 RX ADMIN — HEPARIN SODIUM: 1000 INJECTION INTRAVENOUS; SUBCUTANEOUS at 21:23

## 2023-09-13 RX ADMIN — GENTAMICIN SULFATE: 1 CREAM TOPICAL at 21:22

## 2023-09-13 RX ADMIN — AMIODARONE HYDROCHLORIDE 400 MG: 200 TABLET ORAL at 09:12

## 2023-09-13 RX ADMIN — ACETAMINOPHEN 975 MG: 325 TABLET ORAL at 23:24

## 2023-09-13 RX ADMIN — ACETAMINOPHEN 975 MG: 325 TABLET ORAL at 17:41

## 2023-09-13 RX ADMIN — HEPARIN SODIUM: 1000 INJECTION INTRAVENOUS; SUBCUTANEOUS at 21:27

## 2023-09-13 RX ADMIN — Medication 400 MG: at 22:40

## 2023-09-13 RX ADMIN — METOPROLOL TARTRATE 12.5 MG: 25 TABLET, FILM COATED ORAL at 09:12

## 2023-09-13 ASSESSMENT — PAIN SCALES - GENERAL
PAINLEVEL_OUTOF10: 0

## 2023-09-13 NOTE — CARE COORDINATION
Transition of Care Plan:    RUR: 24% - high readmission  Prior Level of Functioning: independent; children assisted  Disposition: IPR - [address] - started auth today  If SNF or IPR: Date FOC offered: 9/12/23  Date [address] received: 9/12/23  Accepting facility: [address]  Date authorization started with reference number: 9/13/23  Date authorization received and expires: Follow up appointments: Cardiac Surgery  DME needed: none for IPR  Transportation at discharge: neha  Caregiver Contact: Irineo herb - [phone]   Discharge Caregiver contacted prior to discharge? no  Care Conference needed? no  Barriers to discharge: insurance authorization    CM updated by Cardiac Surgery NP in morning rounds that patient is medically stable for placement at inpatient rehab. CM requested [address] to start insurance authorization process. Patient with [facility] and likely will receive decision in 3-4 days re inpatient rehab. Patient remains on PD. CM will continue to follow.     Disposition Plan:  Inpatient Rehab: [facility] Authorization: started and pending with [employer]  Transport: neha [place], [address]  Available via [address] [place] or

## 2023-09-13 NOTE — FLOWSHEET NOTE
09/13/23 0630   Vitals   /64   Pulse 93   Respirations 11   SpO2 95 %   Peritoneal Dialysis Catheter Mid lower abdomen   No placement date or time found. Catheter Location: Mid lower abdomen   Status Clamped   Site Condition Clean, dry, intact   Dressing Status Clean, dry & intact   Dressing Gauze   Date of Last Dressing Change 09/12/23   Dialysis Type Continuous cycling   Catheter Care Given Yes   Post-Treatment (Cycler)   Average Dwell Time (Hours:Minutes) 1:20   Lost Dwell Time (Hours:Minutes) 0:25  (alarms n/a)   Effluent Appearance Clear;Yellow   PD Output (mL) 944 mL  (idrain 120 + uf 824ml =944ml)     Primary RN SBAR: YVON Osei RN  Comments: Pt orders, notes, labs and code status reviewed. Time out complete. Tx complete. Prior to disconnection, two minute Alcavis scrub/soak followed by sterile mini cap application. Line secured to abdomen with small amount of tape. Dressing remains CDI. Patient has no pain to palpation, no complaints to overnight treatment. Upon departure, bed in lowest position, call bell and personal belongings within reach.

## 2023-09-14 ENCOUNTER — APPOINTMENT (OUTPATIENT)
Facility: HOSPITAL | Age: 68
DRG: 853 | End: 2023-09-14
Payer: MEDICARE

## 2023-09-14 ENCOUNTER — HOME HEALTH ADMISSION (OUTPATIENT)
Dept: HOME HEALTH SERVICES | Facility: HOME HEALTH | Age: 68
End: 2023-09-14
Payer: MEDICARE

## 2023-09-14 VITALS
HEIGHT: 65 IN | BODY MASS INDEX: 28.8 KG/M2 | WEIGHT: 172.84 LBS | SYSTOLIC BLOOD PRESSURE: 117 MMHG | HEART RATE: 80 BPM | DIASTOLIC BLOOD PRESSURE: 63 MMHG | TEMPERATURE: 97.6 F | OXYGEN SATURATION: 98 % | RESPIRATION RATE: 22 BRPM

## 2023-09-14 LAB
ANION GAP SERPL CALC-SCNC: 9 MMOL/L (ref 5–15)
BASOPHILS # BLD: 0.1 K/UL (ref 0–0.1)
BASOPHILS NFR BLD: 1 % (ref 0–1)
BUN SERPL-MCNC: 37 MG/DL (ref 6–20)
BUN/CREAT SERPL: 5 (ref 12–20)
CALCIUM SERPL-MCNC: 8.2 MG/DL (ref 8.5–10.1)
CHLORIDE SERPL-SCNC: 102 MMOL/L (ref 97–108)
CO2 SERPL-SCNC: 26 MMOL/L (ref 21–32)
CREAT SERPL-MCNC: 7.35 MG/DL (ref 0.55–1.02)
DIFFERENTIAL METHOD BLD: ABNORMAL
EOSINOPHIL # BLD: 0.3 K/UL (ref 0–0.4)
EOSINOPHIL NFR BLD: 5 % (ref 0–7)
ERYTHROCYTE [DISTWIDTH] IN BLOOD BY AUTOMATED COUNT: 18 % (ref 11.5–14.5)
GLUCOSE BLD STRIP.AUTO-MCNC: 150 MG/DL (ref 65–117)
GLUCOSE BLD STRIP.AUTO-MCNC: 162 MG/DL (ref 65–117)
GLUCOSE BLD STRIP.AUTO-MCNC: 170 MG/DL (ref 65–117)
GLUCOSE SERPL-MCNC: 146 MG/DL (ref 65–100)
HCT VFR BLD AUTO: 27.8 % (ref 35–47)
HGB BLD-MCNC: 8.2 G/DL (ref 11.5–16)
IMM GRANULOCYTES # BLD AUTO: 0 K/UL (ref 0–0.04)
IMM GRANULOCYTES NFR BLD AUTO: 1 % (ref 0–0.5)
IRON SATN MFR SERPL: 35 % (ref 20–50)
IRON SERPL-MCNC: 26 UG/DL (ref 35–150)
LYMPHOCYTES # BLD: 1.5 K/UL (ref 0.8–3.5)
LYMPHOCYTES NFR BLD: 27 % (ref 12–49)
MCH RBC QN AUTO: 29.1 PG (ref 26–34)
MCHC RBC AUTO-ENTMCNC: 29.5 G/DL (ref 30–36.5)
MCV RBC AUTO: 98.6 FL (ref 80–99)
MONOCYTES # BLD: 0.9 K/UL (ref 0–1)
MONOCYTES NFR BLD: 16 % (ref 5–13)
NEUTS SEG # BLD: 2.8 K/UL (ref 1.8–8)
NEUTS SEG NFR BLD: 50 % (ref 32–75)
NRBC # BLD: 0.02 K/UL (ref 0–0.01)
NRBC BLD-RTO: 0.4 PER 100 WBC
PLATELET # BLD AUTO: 382 K/UL (ref 150–400)
PMV BLD AUTO: 9.6 FL (ref 8.9–12.9)
POTASSIUM SERPL-SCNC: 4.1 MMOL/L (ref 3.5–5.1)
RBC # BLD AUTO: 2.82 M/UL (ref 3.8–5.2)
SERVICE CMNT-IMP: ABNORMAL
SODIUM SERPL-SCNC: 137 MMOL/L (ref 136–145)
TIBC SERPL-MCNC: 74 UG/DL (ref 250–450)
WBC # BLD AUTO: 5.6 K/UL (ref 3.6–11)

## 2023-09-14 PROCEDURE — 6370000000 HC RX 637 (ALT 250 FOR IP)

## 2023-09-14 PROCEDURE — 71045 X-RAY EXAM CHEST 1 VIEW: CPT

## 2023-09-14 PROCEDURE — 6370000000 HC RX 637 (ALT 250 FOR IP): Performed by: NURSE PRACTITIONER

## 2023-09-14 PROCEDURE — 83540 ASSAY OF IRON: CPT

## 2023-09-14 PROCEDURE — 83550 IRON BINDING TEST: CPT

## 2023-09-14 PROCEDURE — 82962 GLUCOSE BLOOD TEST: CPT

## 2023-09-14 PROCEDURE — 97535 SELF CARE MNGMENT TRAINING: CPT

## 2023-09-14 PROCEDURE — 85025 COMPLETE CBC W/AUTO DIFF WBC: CPT

## 2023-09-14 PROCEDURE — 36415 COLL VENOUS BLD VENIPUNCTURE: CPT

## 2023-09-14 PROCEDURE — 2580000003 HC RX 258: Performed by: INTERNAL MEDICINE

## 2023-09-14 PROCEDURE — 6360000002 HC RX W HCPCS: Performed by: INTERNAL MEDICINE

## 2023-09-14 PROCEDURE — 80048 BASIC METABOLIC PNL TOTAL CA: CPT

## 2023-09-14 PROCEDURE — 97116 GAIT TRAINING THERAPY: CPT

## 2023-09-14 RX ORDER — ASPIRIN 81 MG/1
81 TABLET, CHEWABLE ORAL DAILY
Qty: 30 TABLET | Refills: 3 | Status: SHIPPED | OUTPATIENT
Start: 2023-09-15

## 2023-09-14 RX ORDER — AMIODARONE HYDROCHLORIDE 200 MG/1
TABLET ORAL
Qty: 84 TABLET | Refills: 0 | Status: SHIPPED | OUTPATIENT
Start: 2023-09-14 | End: 2023-10-12

## 2023-09-14 RX ORDER — DIPHENHYDRAMINE HYDROCHLORIDE 25 MG/1
1 CAPSULE, LIQUID FILLED ORAL
Qty: 1 KIT | Refills: 0 | Status: SHIPPED | OUTPATIENT
Start: 2023-09-14

## 2023-09-14 RX ORDER — LANOLIN ALCOHOL/MO/W.PET/CERES
6 CREAM (GRAM) TOPICAL NIGHTLY PRN
Qty: 60 TABLET | Refills: 1 | Status: SHIPPED | OUTPATIENT
Start: 2023-09-14

## 2023-09-14 RX ORDER — PANTOPRAZOLE SODIUM 40 MG/1
40 TABLET, DELAYED RELEASE ORAL DAILY
Qty: 30 TABLET | Refills: 3 | Status: SHIPPED | OUTPATIENT
Start: 2023-09-15

## 2023-09-14 RX ORDER — CARVEDILOL 6.25 MG/1
6.25 TABLET ORAL 2 TIMES DAILY WITH MEALS
Status: DISCONTINUED | OUTPATIENT
Start: 2023-09-14 | End: 2023-09-14 | Stop reason: HOSPADM

## 2023-09-14 RX ORDER — ACETAMINOPHEN 325 MG/1
975 TABLET ORAL EVERY 6 HOURS
Qty: 120 TABLET | Refills: 3 | Status: SHIPPED | COMMUNITY
Start: 2023-09-14

## 2023-09-14 RX ORDER — INSULIN GLARGINE 100 [IU]/ML
20 INJECTION, SOLUTION SUBCUTANEOUS NIGHTLY
Qty: 10 ML | Refills: 3 | Status: SHIPPED | OUTPATIENT
Start: 2023-09-14

## 2023-09-14 RX ORDER — CARVEDILOL 6.25 MG/1
6.25 TABLET ORAL 2 TIMES DAILY WITH MEALS
Qty: 60 TABLET | Refills: 3 | Status: SHIPPED | OUTPATIENT
Start: 2023-09-14

## 2023-09-14 RX ORDER — GLUCOSAMINE HCL/CHONDROITIN SU 500-400 MG
CAPSULE ORAL
Qty: 100 STRIP | Refills: 1 | Status: SHIPPED | OUTPATIENT
Start: 2023-09-14

## 2023-09-14 RX ORDER — SENNA AND DOCUSATE SODIUM 50; 8.6 MG/1; MG/1
1 TABLET, FILM COATED ORAL 2 TIMES DAILY
Qty: 28 TABLET | Refills: 0 | Status: SHIPPED | OUTPATIENT
Start: 2023-09-14 | End: 2023-09-28

## 2023-09-14 RX ORDER — OXYCODONE HYDROCHLORIDE 5 MG/1
5 TABLET ORAL EVERY 6 HOURS PRN
Qty: 12 TABLET | Refills: 0 | Status: SHIPPED | OUTPATIENT
Start: 2023-09-14 | End: 2023-09-17

## 2023-09-14 RX ORDER — LANOLIN ALCOHOL/MO/W.PET/CERES
400 CREAM (GRAM) TOPICAL 2 TIMES DAILY
Qty: 60 TABLET | Refills: 0 | Status: SHIPPED | OUTPATIENT
Start: 2023-09-14 | End: 2023-10-14

## 2023-09-14 RX ORDER — POLYETHYLENE GLYCOL 3350 17 G/17G
17 POWDER, FOR SOLUTION ORAL DAILY
Qty: 14 PACKET | Refills: 0 | Status: SHIPPED | OUTPATIENT
Start: 2023-09-15 | End: 2023-09-29

## 2023-09-14 RX ADMIN — Medication 400 MG: at 08:57

## 2023-09-14 RX ADMIN — Medication 4 UNITS: at 17:09

## 2023-09-14 RX ADMIN — Medication 4 UNITS: at 12:14

## 2023-09-14 RX ADMIN — AMIODARONE HYDROCHLORIDE 400 MG: 200 TABLET ORAL at 08:57

## 2023-09-14 RX ADMIN — CARVEDILOL 6.25 MG: 6.25 TABLET, FILM COATED ORAL at 09:02

## 2023-09-14 RX ADMIN — Medication 4 UNITS: at 09:08

## 2023-09-14 RX ADMIN — ACETAMINOPHEN 975 MG: 325 TABLET ORAL at 13:50

## 2023-09-14 RX ADMIN — ASPIRIN 81 MG CHEWABLE TABLET 81 MG: 81 TABLET CHEWABLE at 08:57

## 2023-09-14 RX ADMIN — CARVEDILOL 6.25 MG: 6.25 TABLET, FILM COATED ORAL at 17:09

## 2023-09-14 RX ADMIN — DULOXETINE 20 MG: 20 CAPSULE, DELAYED RELEASE ORAL at 08:57

## 2023-09-14 RX ADMIN — IRON SUCROSE 400 MG: 20 INJECTION, SOLUTION INTRAVENOUS at 09:02

## 2023-09-14 RX ADMIN — PANTOPRAZOLE SODIUM 40 MG: 40 TABLET, DELAYED RELEASE ORAL at 08:58

## 2023-09-14 ASSESSMENT — PAIN SCALES - GENERAL
PAINLEVEL_OUTOF10: 5
PAINLEVEL_OUTOF10: 0
PAINLEVEL_OUTOF10: 2
PAINLEVEL_OUTOF10: 0

## 2023-09-14 ASSESSMENT — PAIN DESCRIPTION - LOCATION: LOCATION: BACK

## 2023-09-14 NOTE — CARE COORDINATION
Transition of Care Plan:    RUR: 23%-High  Prior Level of Functioning: Independent  Disposition: home with home health discharging today with [facility] providing [facility] health services. Contact information added to discharge summary. Patient and family are agreeable to this disposition. Medicare pt has received, reviewed, and signed 2nd IM letter informing them of their right to appeal the discharge. Signed copy has been placed on pt bedside chart.

## 2023-09-14 NOTE — DISCHARGE INSTRUCTIONS
several hundred feet several times daily. DIET  Eat an American Heart Association diet. If you are having trouble with your appetite, eat what you can. Try eating small, frequent meals throughout the day. ACTIVITY  NO DRIVING--you will be evaluated to drive at your follow up visit. Increase your activity by walking several times a day. Stay out of bed most of the day. When sitting, keep your legs elevated. You may ride in a car, but you must get out every hour and walk around. If you ride in a car with an airbag that can not be switched off, put the seat ALL the way back or ride in the back seat. Any load bearing activity can be performed as long as it can be performed \"in the tube\". You can reach \"out of the tube\" when performing activities that don't require heavy lifting. Let pain be your guide, your pain level will keep you from doing anything extreme. Normal Problems after Discharge  Some fatigue and difficulty sleeping  Poor appetite initially; temporary change in taste  Temperature Variability -- felling hot and cold  Chest wall soreness and paresthesia (numbness)  Some musculoskeletal pain along joint lines in chest and back. Tylenol will help unless contraindicated. Patients with EVH (Endoscopic vein harvest) will have mild swelling in that leg due to temporary venous insufficiency. Elevation and compression socks will help to reduce the swelling. FOLLOW UP  Your first follow up appointment will be on September 19th at 11:30am via phone call. Your second follow up appointment will be in four weeks, on October 10th at 1pm in our office which is located in [place] [address] at [facility]. Please call our office at [phone] if you are unable to make either one of these appointments. You will be receiving a call before your 5 day appointment to begin cardiac rehab.  They are programs located at [facility], [facility] and

## 2023-09-14 NOTE — DISCHARGE SUMMARY
Cardiac Surgery Discharge Summary     Patient ID:  Radhika Taypshire  [id number]  40 y.o.  1955    Admit date: 8/18/2023    Discharge date: 9/14/2023     Admitting Physician: Solitario Lucas MD    Referring Cardiologist:  Griselda Bedolla MD    PCP:  Yamel Reagan MD    Admitting Diagnoses: Colitis, Acute Anemia r/t GI bleed, +NSTEMI (Type II), CAD, Bacteremia    Discharge Diagnoses: s/p CABG, HFmrEF    1. NSTEMI (non-ST elevated myocardial infarction) ([address])    2. Diarrhea, unspecified type    3. Colitis    4. Elevated troponin    5. Pleural effusion    6. Hypokalemia    7. Lactic acidosis    8. Shortness of breath    9. Sepsis with acute organ dysfunction without septic shock, due to unspecified organism, unspecified type ([address])    10. Chest pain at rest    11. Bacteremia    12. Acute and subacute endocarditis, unspecified    13. Hypercholesteremia    14. Hypertension, unspecified type    15. ASHD (arteriosclerotic heart disease)    16. S/P CABG x 3        Discharged Condition: Stable    Disposition: home, see patient instructions for treatment and plan    Procedures for this admission:    9/7/23 with Dr. Gaviria Luci:  [address] [facility] X 3 (LMA-LAD, SVG-OM1-OM2) WITH [address], ECC, SONIA AND EPIAORTIC U/S BY DR YANCEY    Discharge Medications:      Medication List        START taking these medications      acetaminophen 325 MG tablet  Commonly known as: TYLENOL  Take 3 tablets by mouth in the morning and 3 tablets at noon and 3 tablets in the evening and 3 tablets before bedtime. amiodarone 200 MG tablet  Commonly known as: CORDARONE  Take 2 tablets by mouth 2 times daily for 14 days, THEN 2 tablets daily for 14 days.   Start taking on: September 14, 2023     aspirin 81 MG chewable tablet  Take 1 tablet by mouth daily  Start taking on: September 15, 2023  Replaces: aspirin 325 MG tablet     Blood Glucose Monitor System w/Device Kit  1 Device by Does not apply route 4 times daily (before meals and nightly)

## 2023-09-14 NOTE — FLOWSHEET NOTE
PD DISCONNECTION   09/14/23 0800   Observations & Evaluations   Level of Consciousness 0   Respiratory Quality/Effort Unlabored   O2 Device None (Room air)   Skin Condition/Temp Warm;Dry   Abdomen Inspection Soft   Vital Signs   /62   Pulse 86   Respirations 16   Technical Checks   ICEBOAT I;C;E;B;O;A;T        09/14/23 0800   Vitals   /62   Pulse 86   Respirations 16   Peritoneal Dialysis Catheter Mid lower abdomen   No placement date or time found. Catheter Location: Mid lower abdomen   Status Deaccessed   Dressing Status Clean, dry & intact   Dressing Gauze   Date of Last Dressing Change 09/13/23   Post-Treatment (Cycler)   Average Dwell Time (Hours:Minutes) 1:17   Lost Dwell Time (Hours:Minutes) 0:36   Effluent Appearance Clear;Yellow   PD Output (mL) 1427 mL  ((ID 87 + UF 1340 = NET UF 1427)     Pd disconnection:  One minute alcavis scrub followed by one minute soak and sterile mini cap. Patient up in chair, primary nurse in at bedside.

## 2023-09-14 NOTE — CARDIO/PULMONARY
Cardiac Rehab: Consult received and chart reviewed. Trent Luz is s/p CABG and NSTEMI. Patient is being referred to [facility].  Confirmed that [facility] contact information is on the AVS.

## 2023-09-15 ENCOUNTER — HOME CARE VISIT (OUTPATIENT)
Facility: HOME HEALTH | Age: 68
End: 2023-09-15
Payer: MEDICARE

## 2023-09-15 ENCOUNTER — TELEPHONE (OUTPATIENT)
Age: 68
End: 2023-09-15

## 2023-09-15 VITALS
HEART RATE: 93 BPM | DIASTOLIC BLOOD PRESSURE: 70 MMHG | SYSTOLIC BLOOD PRESSURE: 130 MMHG | WEIGHT: 174 LBS | OXYGEN SATURATION: 99 % | TEMPERATURE: 97.2 F | RESPIRATION RATE: 18 BRPM | BODY MASS INDEX: 28.96 KG/M2

## 2023-09-15 PROCEDURE — G0299 HHS/HOSPICE OF RN EA 15 MIN: HCPCS

## 2023-09-15 ASSESSMENT — ENCOUNTER SYMPTOMS
BOWEL INCONTINENCE: 1
PAIN LOCATION - PAIN QUALITY: CONTINUOUS
DIARRHEA: 1
DYSPNEA ACTIVITY LEVEL: AFTER AMBULATING LESS THAN 10 FT

## 2023-09-15 NOTE — TELEPHONE ENCOUNTER
Cardiac Surgery Discharge - Follow up call placed to [phone]. No answer. Left voicemail and contact information.

## 2023-09-15 NOTE — HOME HEALTH
Reason for referral, [facility] SUMMARY of clinical condition: Mahesh Bowling admitted to home care for Encounter for surgical aftercare following surgery on the circulatory system. Nursing, Physical Therapy and Occupational Therapy needed for cardiopulmonary assessment, education on disease process, HEP and medication regimen. Clinical Assessment/Skilled reason for admission to home health (What this means for the patient overall and need for ongoing skilled care):Galilea Dove is a 71 y/o female who was referred by Dr. Ryan Perez for surgical evaluation of multivessel CAD. She lives at home with son, daughter and granddaughter in [facility] with [facility]. She has PMHx. CAD s/p remote stents in 2009 and 2010, CHF, HTN, ESRD on PD, COPD, T2DM, GERD, and anemia of CKD. She presented to ED 8/19 for complaints of diarrhea and was admitted with colitis, sepsis, pulmonary edema, pleural effusions, elevated troponin, + NSTEMI. CABG completed with Dr. Rachel Naqvi 9/7. Patient gets PD at home at night, which she says son helps set up. She ambulates in home using seated walker, but is unsafe walking due to dyspnea, fatigue, weakness, and report of multiple recent falls. Incisions to sternum and left leg are d/i. Visits are needed for cardiopulmonary assessment and education on disease process to reduce risk of re-hospitalization. Diagnosis: Encounter for surgical aftercare following surgery on the circulatory system. Subjective (statement from pt/cg that is relative to why you are there): \"I get a little wobbly when I first stand up. \"    Caregiver: son, daughter  Caregiver assists with: Medications, Meals, Bathing, ADL, IADL, Wound Care, Transportation and Housekeeping Caregiver unable to assist with: N/A. Caregiver is available Inconsistently  Caregiver is not present at this visit and did not participate with clinician.     Medications reconciled and all medications are available in the home this visit except new prescriptions following

## 2023-09-18 ENCOUNTER — HOME CARE VISIT (OUTPATIENT)
Facility: HOME HEALTH | Age: 68
End: 2023-09-18
Payer: MEDICARE

## 2023-09-18 VITALS
BODY MASS INDEX: 29.45 KG/M2 | RESPIRATION RATE: 16 BRPM | OXYGEN SATURATION: 98 % | WEIGHT: 177 LBS | HEART RATE: 83 BPM | TEMPERATURE: 97 F | DIASTOLIC BLOOD PRESSURE: 60 MMHG | SYSTOLIC BLOOD PRESSURE: 94 MMHG

## 2023-09-18 VITALS
DIASTOLIC BLOOD PRESSURE: 61 MMHG | TEMPERATURE: 98.1 F | SYSTOLIC BLOOD PRESSURE: 111 MMHG | HEART RATE: 77 BPM | OXYGEN SATURATION: 100 %

## 2023-09-18 PROCEDURE — G0152 HHCP-SERV OF OT,EA 15 MIN: HCPCS

## 2023-09-18 PROCEDURE — G0151 HHCP-SERV OF PT,EA 15 MIN: HCPCS

## 2023-09-18 PROCEDURE — G0299 HHS/HOSPICE OF RN EA 15 MIN: HCPCS

## 2023-09-18 ASSESSMENT — ENCOUNTER SYMPTOMS
DIARRHEA: 1
PAIN LOCATION - PAIN QUALITY: SORE, ACHING
BOWEL INCONTINENCE: 1

## 2023-09-18 NOTE — HOME HEALTH
Reason for referral, [facility] SUMMARY of clinical condition: Pt is 70year old female admitted to home care for surgican aftercare of circulatory system. Occupational Therapy needed for ADL training, IADL training, UE strengthening, functional mobility. Clinical Assessment/Skilled reason for admission to home health (What this means for the patient overall and need for ongoing skilled care): Therapist assessed pt for ADL performance indicating pt requires min assist for toileting/LB dresisng, mod assist for bathing, SBA for UB dressing, CGA for grooming tasks. Pt exhibits Barthel score of 55/100 indicating moderate dependence. Pt exhibits 3+/5 MMT in BUEs; functional reach of 4 inches. OT services warranted to assist with obtaning BSC as well. Free foundation application completed 9/71    Diagnosis: aftercare for surgery on circulatory system; multivessl CAD    Subjective (statement from pt/cg that is relative to why you are there): It's taking me so long to do anything    Caregiver: relative. Caregiver assists with: Medications, Meals, Bathing, ADL, IADL, Transportation and Housekeeping Caregiver unable to assist with: NA. Caregiver is available Regularly Caregiver is  present at this visit and did not participate with clinician. Medications reconciled and all medications are available in the home this visit. The following education was provided regarding medications: medication interactions and look alike medications. Patient/CG able to demonstrate knowledge through teach back with 80 percent accuracy.           A list of reconciled medications has been reviewed    High risk med teaching was performed on hypoglycemic, narcotic, and antiplatlet    Patient at risk for falls Yes:   Recommended requesting PT/OT orders due to fall risk YES:   Patient response to recommended requesting of PT/OT orders: Agreeable to POC    Interdisciplinary communication with: POC discussed with SN and PT    Written Teaching

## 2023-09-19 ENCOUNTER — OFFICE VISIT (OUTPATIENT)
Age: 68
End: 2023-09-19

## 2023-09-19 VITALS
TEMPERATURE: 97.5 F | DIASTOLIC BLOOD PRESSURE: 55 MMHG | RESPIRATION RATE: 16 BRPM | OXYGEN SATURATION: 99 % | SYSTOLIC BLOOD PRESSURE: 105 MMHG | HEART RATE: 77 BPM

## 2023-09-19 DIAGNOSIS — I25.10 CORONARY ARTERY DISEASE INVOLVING NATIVE CORONARY ARTERY OF NATIVE HEART WITHOUT ANGINA PECTORIS: Primary | ICD-10-CM

## 2023-09-19 PROCEDURE — 99024 POSTOP FOLLOW-UP VISIT: CPT | Performed by: NURSE PRACTITIONER

## 2023-09-19 ASSESSMENT — ENCOUNTER SYMPTOMS
DYSPNEA ACTIVITY LEVEL: AFTER AMBULATING 10 - 20 FT
PAIN LOCATION - PAIN QUALITY: ACHY/SORE

## 2023-09-19 NOTE — PROGRESS NOTES
Patient: Yael Matson   Age: 70 y.o. Patient Care Team:  Jorge Nelson MD as PCP - Alistair Martinez MD as Consulting Physician (Cardiothoracic Surgery)  Scott Flores MD as Consulting Physician (Cardiothoracic Surgery)  Talon Wiggins MD as Consulting Physician (Cardiothoracic Surgery)    Diagnosis:   s/p CABG, HFmrEF     Problem List:   Patient Active Problem List   Diagnosis    NSTEMI (non-ST elevated myocardial infarction) [facility])    Chest pain at rest    Hypertension, benign    ASHD (arteriosclerotic heart disease)    ASO (arteriosclerosis obliterans)    Hypercholesteremia    Bilateral leg edema    Acute renal failure (ARF) (HCC)    Diabetes mellitus (HCC)    SOB (shortness of breath)    Symptomatic anemia    Peritoneal dialysis catheter in place [facility])    Severe sepsis (HCC)    Diastolic heart failure ([address])    Sepsis with acute organ dysfunction without septic shock ([address])    S/P CABG x 3        This service was provided thru telehealth, between Danielle Ryan NP at Cardiac Surgery Specialist's office and Michael Peralesr at her home. Surgery: 9/7/23 with Dr. Dill Brand:  [facility] X 3 (LMA-LAD, SVG-OM1-OM2) WITH [address], ECC, SONIA AND EPIAORTIC U/S BY DR Reji Ames    HPI:  Pt called for follow up. No fevers, no chills. Bowels: soft. Hemorrhoids: can use Prep H and Tucks. Voids okay. She is walking. No palpitations. No edema. Chest is okay. No drainage or instability. She is doing \"Okay\". She denies any concerns or problems. From chart:  Yael Matson is a 70 y.o. female who was referred by Dr. Polo Rios for cardiac surgical evaluation of multiv CAD.  She has a PMHx of CAD s/p remote stents in 2009 and 2010 to her Lcx and OM (follows with Dr. Jeannine Harrell), obesity, CHF, HTN, HLD, ESRD on PD, COPD, asthma, T2DM, GERD, anemia of CKD, \"stents in legs\" ten years ago (Dr. Comfort Yanez), and h/o gastric intestinal metaplasia with low grade dysplasia and H pylori gastritis s/p EGD at [facility] on

## 2023-09-19 NOTE — HOME HEALTH
Reason for referral, [facility] SUMMARY of clinical condition: Ms. Garry Cormier was admitted to home care for s/p CABG surgery. Pt has hx of DM, ESRD (on home dialysis), HTN;  Nursing, Physical Therapy and Occupational Therapy needed for evaluation and assessment. Clinical Assessment/Skilled reason for admission to home health (What this means for the patient overall and need for ongoing skilled care): Pt evaluated by home PT and presents with significant mobility impairments and weakness. Poses risk for falls and needs help from caregivers with all mobility. Diagnosis: CABG, ESRD (on dialysis), DM, HTN    Subjective (statement from pt/cg that is relative to why you are there): I am feeling very tired and weak since my surgery and I am having chest pain at site of my incision. Caregiver: Macario Garcia (daughter); Lives at home with the patient. Caregiver assists with: Medications, Meals, Bathing, ADL, IADL, Wound Care, Transportation and Housekeeping Caregiver unable to assist with: Home PT as she works during the day. Caregiver was not present for home visit today. Medications reconciled and all medications are available in the home this visit. Patient at risk for falls:  Yes, PT and OT already ordered and patient agreeable to care    Interdisciplinary communication with: Yes with OT regarding functional abilities and limitations. Written Teaching Material Utilized: None given at this time. Clinician reviewed orientation to home health booklet with patient/caregiver including agency phone number, agency complaint process, state hotline number, as well as [employer]'s quality hotline number. Emergency preparedness reviewed with patient/caregiver in home health booklet. Consent forms signed    Patient/caregiver instructed on plan of care and are agreeable to plan of care at this time. Plan of care and admission to home health status called to attending physician.  The following

## 2023-09-19 NOTE — HOME HEALTH
Subjective: \"I am feeling weak in my legs lately\"  Falls since last visit: No (if yes complete the Fall Tracking Form and include bsrifallreport):   Caregiver involvement changes: NA  Home health supplies by type and quantity ordered/delivered this visit include: NA    Clinician asked if patient has had any physician contact since last home care visit and patient states: NO  Clinician asked if patient has any new or changed medications and patient states:  NO   If Yes, were medications reconciled? N/A   Was the certifying physician notified of changes in medications? N/A     Clinical assessment (what this visit means for the patient overall and need for ongoing skilled care) and progress or lack of progress towards SPECIFIC goals: Peritoneal dialysis patient whose post-op with sternal precuations requiring assessment and teaching of sternal precautions and monitoring of incision post op to prevent infection and rehospitalization. Patient progressing towards educational goals and wound care goals. Written Teaching Material Utilized: N/A    Interdisciplinary communication with: N/A    Discharge planning as follows: When goals are met    Specific plan for next visit: Ongoing assessment, teaching and wound assessment.

## 2023-09-20 ENCOUNTER — HOME CARE VISIT (OUTPATIENT)
Facility: HOME HEALTH | Age: 68
End: 2023-09-20
Payer: MEDICARE

## 2023-09-20 VITALS
HEART RATE: 83 BPM | TEMPERATURE: 97.3 F | OXYGEN SATURATION: 100 % | SYSTOLIC BLOOD PRESSURE: 90 MMHG | DIASTOLIC BLOOD PRESSURE: 62 MMHG

## 2023-09-20 PROCEDURE — G0158 HHC OT ASSISTANT EA 15: HCPCS

## 2023-09-20 PROCEDURE — G0151 HHCP-SERV OF PT,EA 15 MIN: HCPCS

## 2023-09-20 NOTE — HOME HEALTH
Subjective: Im just so tired, I went to the bathroom and I cant do anything anymore  Falls since last visit (if yes complete the Fall Tracking Form and include bsrifallreport): no  Caregiver involvement changes: no  Home health supplies by type and quantity ordered/delivered this visit include: no    Clinician asked if patient has had any physician contact since last home care visit and patient states: no  Clinician asked if patient has any new or changed medications and patient states:  no  If Yes, were medications reconciled? n/a  Was the certifying physician notified of changes in medications? n/a    Clinical assessment (what this visit means for the patient overall and need for ongoing skilled care) and progress or lack of progress towards SPECIFIC goals: Patient continues to have difficulty with EC and higher fatigue levels, especially during toileting routine as patient is having incontinence issues as well as problems with hemorrhoid. Patient will continue to require training on maintaining cardiac precautions of in the tube training during ADL routines. Patient required min A for sit to stand from commode, min A for LB dressing due to fatigue.        Written Teaching Material Utilized: no    Interdisciplinary communication with: no    Discharge planning as follows: Per physician order, Will discharge when the patient has reached their maximum functional potential and maximum safety in their home and When goals are met    Specific plan for next visit: Re-instruct patient on ADLs with cardiac precautions and EC

## 2023-09-21 ENCOUNTER — HOME CARE VISIT (OUTPATIENT)
Facility: HOME HEALTH | Age: 68
End: 2023-09-21
Payer: MEDICARE

## 2023-09-21 VITALS
TEMPERATURE: 97 F | SYSTOLIC BLOOD PRESSURE: 128 MMHG | RESPIRATION RATE: 18 BRPM | DIASTOLIC BLOOD PRESSURE: 70 MMHG | OXYGEN SATURATION: 100 % | HEART RATE: 88 BPM

## 2023-09-21 PROCEDURE — G0299 HHS/HOSPICE OF RN EA 15 MIN: HCPCS

## 2023-09-21 ASSESSMENT — ENCOUNTER SYMPTOMS
PAIN LOCATION - PAIN QUALITY: ACHY/SORE
PAIN LOCATION - PAIN QUALITY: STABBING

## 2023-09-21 NOTE — HOME HEALTH
Subjective: Pt states that her dialysis machine has been acting up and she did not sleep well last night. Falls since last visit: No  Caregiver involvement changes: No changes  Home health supplies by type and quantity ordered/delivered this visit include: N/A    Clinician asked if patient has had any physician contact since last home care visit and patient states: NO  Clinician asked if patient has any new or changed medications and patient states:  NO   If Yes, were medications reconciled? NA  Was the certifying physician notified of changes in medications? NA    Clinical assessment (what this visit means for the patient overall and need for ongoing skilled care) and progress or lack of progress towards SPECIFIC goals: Pt is still recoverying from CABG surgery. Has weakness of both legs and SOB with activity. Skilled PT needed for strengthening, gait traning, and fall prevention education    Written Teaching Material Utilized: None yet    Interdisciplinary communication with: N/A     Discharge planning as follows: Is no longer homebound, Will discharge when the patient has reached their maximum functional potential and maximum safety in their home and When goals are met    Specific plan for next visit: Instruct caregiver/patient in gait training and home safety. Therex for strength improvement.

## 2023-09-22 NOTE — HOME HEALTH
Subjective: \"I'm feeling much betetr today, not as tired as usual\"  Falls since last visit: No (if yes complete the Fall Tracking Form and include bsrifallreport):   Caregiver involvement changes: NA  Home health supplies by type and quantity ordered/delivered this visit include: NA    Clinician asked if patient has had any physician contact since last home care visit and patient states: NO  Clinician asked if patient has any new or changed medications and patient states:  NO   If Yes, were medications reconciled? N/A   Was the certifying physician notified of changes in medications? N/A     Clinical assessment (what this visit means for the patient overall and need for ongoing skilled care) and progress or lack of progress towards SPECIFIC goals: Patient with recent surgery on sternal precautions  and on home peritoneal dialysis requiring SN services for assessment monitoring post-op, teaching, and wound observation. Patient is progressing towards educational goals and wound care goals are progressing well. Patient will be ready for SN discharge next week. Written Teaching Material Utilized: N/A    Interdisciplinary communication with: interdisciplinary team for the purpose of POC collaboration    Discharge planning as follows:  When goals are met    Specific plan for next visit: Ongoing wound assessment, sternal precautions education,

## 2023-09-25 ENCOUNTER — HOME CARE VISIT (OUTPATIENT)
Facility: HOME HEALTH | Age: 68
End: 2023-09-25
Payer: MEDICARE

## 2023-09-25 VITALS
TEMPERATURE: 97 F | DIASTOLIC BLOOD PRESSURE: 62 MMHG | OXYGEN SATURATION: 99 % | HEART RATE: 85 BPM | SYSTOLIC BLOOD PRESSURE: 110 MMHG

## 2023-09-25 PROCEDURE — G0151 HHCP-SERV OF PT,EA 15 MIN: HCPCS

## 2023-09-25 PROCEDURE — G0158 HHC OT ASSISTANT EA 15: HCPCS

## 2023-09-25 ASSESSMENT — ENCOUNTER SYMPTOMS: PAIN LOCATION - PAIN QUALITY: ACHE

## 2023-09-25 NOTE — HOME HEALTH
Subjective: im so tired today  Falls since last visit (if yes complete the Fall Tracking Form and include bsrifallreport): no   Caregiver involvement changes: no changes   Home health supplies by type and quantity ordered/delivered this visit include: no   Clinician asked if patient has had any physician contact since last home care visit and patient states: NO   Clinician asked if patient has any new or changed medications and patient states: NO   If Yes, were medications reconciled? NA   Was the certifying physician notified of changes in medications? NA   Clinical assessment (what this visit means for the patient overall and need for ongoing skilled care) and progress or lack of progress towards SPECIFIC goals: Patient more fatigued today with needing moderate encouragement to participate in session, but patient only willing to complete standing EOB and sitting with light exercise. Patient requires min a for bed mobility to maintain cardiac precautions and average stand around one minute today with encouragement cues. Education to continuously move and get out of bed as much as possible or building endurance and strength. Daughter is concerned patient is more fatigued than usual and has called cardiologists, but is awaiting call back to voice concerns. Written Teaching Material Utilized: no   Interdisciplinary communication with: no   Discharge planning as follows:  When goals are met and per physican order   Specific plan for next visit: instruct patient on ADLs with precautions/ transfer training

## 2023-09-26 ENCOUNTER — HOME CARE VISIT (OUTPATIENT)
Facility: HOME HEALTH | Age: 68
End: 2023-09-26
Payer: MEDICARE

## 2023-09-26 VITALS
DIASTOLIC BLOOD PRESSURE: 70 MMHG | SYSTOLIC BLOOD PRESSURE: 126 MMHG | RESPIRATION RATE: 16 BRPM | HEART RATE: 70 BPM | OXYGEN SATURATION: 98 % | TEMPERATURE: 97.5 F

## 2023-09-26 PROCEDURE — G0299 HHS/HOSPICE OF RN EA 15 MIN: HCPCS

## 2023-09-26 ASSESSMENT — ENCOUNTER SYMPTOMS: PAIN LOCATION - PAIN QUALITY: ACHY/SORE

## 2023-09-26 NOTE — HOME HEALTH
Subjective: Pt with no complaints today  Falls since last visit: No  Caregiver involvement changes: No changes  Home health supplies by type and quantity ordered/delivered this visit include: N/A    Clinician asked if patient has had any physician contact since last home care visit and patient states: NO  Clinician asked if patient has any new or changed medications and patient states:  NO   If Yes, were medications reconciled? NA  Was the certifying physician notified of changes in medications? NA    Clinical assessment (what this visit means for the patient overall and need for ongoing skilled care) and progress or lack of progress towards SPECIFIC goals: Pt is still recoverying from CABG surgery. Has weakness of both legs and SOB with activity. Skilled PT needed for strengthening, gait traning, and fall prevention education. Pt is repsonding well to therex thus far. Mobility and safety is improving. Written Teaching Material Utilized: None yet    Interdisciplinary communication with: N/A     Discharge planning as follows: Is no longer homebound, Will discharge when the patient has reached their maximum functional potential and maximum safety in their home and When goals are met    Specific plan for next visit: Instruct caregiver/patient in gait training and home safety. Therex for strength improvement.

## 2023-09-27 ENCOUNTER — TELEPHONE (OUTPATIENT)
Age: 68
End: 2023-09-27

## 2023-09-27 ENCOUNTER — HOME CARE VISIT (OUTPATIENT)
Facility: HOME HEALTH | Age: 68
End: 2023-09-27
Payer: MEDICARE

## 2023-09-27 VITALS
OXYGEN SATURATION: 100 % | SYSTOLIC BLOOD PRESSURE: 104 MMHG | TEMPERATURE: 97.7 F | HEART RATE: 77 BPM | DIASTOLIC BLOOD PRESSURE: 70 MMHG

## 2023-09-27 PROCEDURE — G0158 HHC OT ASSISTANT EA 15: HCPCS

## 2023-09-27 RX ORDER — ONDANSETRON 4 MG/1
4 TABLET, ORALLY DISINTEGRATING ORAL 3 TIMES DAILY PRN
Qty: 21 TABLET | Refills: 0 | Status: ON HOLD | OUTPATIENT
Start: 2023-09-27

## 2023-09-27 NOTE — TELEPHONE ENCOUNTER
Patient's daughter, Manju Pichardo, called stating that her mother has been vomiting for over a week now. She said that she called in on Monday but did not get a return call. She says that her mother is only eating small amounts of crackers and apple sauce so she can take her medications. She's also been drinking broth. Carvedilol has been held as diastolic readings have so far been under 100. Call daughter back at [phone].

## 2023-09-27 NOTE — HOME HEALTH
Subjective: pt reports throwing up past 2 days and cant keep anything down; laying in bed  Falls since last visit (if yes complete the Fall Tracking Form and include bsrifallreport): no  Caregiver involvement changes: no changes  Home health supplies by type and quantity ordered/delivered this visit include: no    Clinician asked if patient has had any physician contact since last home care visit and patient states: no  Clinician asked if patient has any new or changed medications and patient states:  yes  If Yes, were medications reconciled? na  Was the certifying physician notified of changes in medications? na    Clinical assessment (what this visit means for the patient overall and need for ongoing skilled care) and progress or lack of progress towards SPECIFIC goals: Patient reporting continuous throwing up and unable to keep anything down. Daughter has been in contact with cardiologist and finally got a callback from nurse practitioner to state stopping medication as this could be a possible side effect and prescribing an anti-nausea pill that daughter is picking up this afternoon. DAVIDSON left number for dispatch health in case medication does not work and is continuously throwing up and needs sooner treatment and unable to be seen in office. Patient does not have testing strips at home but daughter report she's picking up as well, educating to check blood sugar levels due to her, not eating or drinking much . DAVIDSON also educating for patient to try to move as best as possible, both lower and upper extremities for some type of exercise, when laying in bed to reduce getting more weak. Limited progress with goals this week due to patient Not feeling well. Written Teaching Material Utilized: no    Interdisciplinary communication with:no    Discharge planning as follows:  Is no longer homebound, Per physician order, Will discharge when the patient has reached their maximum functional potential and maximum safety in

## 2023-09-27 NOTE — TELEPHONE ENCOUNTER
The patient's daughter called on Monday but didn't get a call back. The patient has been vomiting for a week and not eating much. I recommended stopping the amiodarone and I ordered Zofran oral disolving tablets. She is to call back if this doesn't help.

## 2023-09-28 ENCOUNTER — HOME CARE VISIT (OUTPATIENT)
Facility: HOME HEALTH | Age: 68
End: 2023-09-28
Payer: MEDICARE

## 2023-09-30 ENCOUNTER — APPOINTMENT (OUTPATIENT)
Facility: HOSPITAL | Age: 68
End: 2023-09-30
Payer: MEDICARE

## 2023-09-30 ENCOUNTER — HOSPITAL ENCOUNTER (INPATIENT)
Facility: HOSPITAL | Age: 68
LOS: 6 days | Discharge: HOME | End: 2023-10-06
Attending: STUDENT IN AN ORGANIZED HEALTH CARE EDUCATION/TRAINING PROGRAM | Admitting: FAMILY MEDICINE
Payer: MEDICARE

## 2023-09-30 ENCOUNTER — HOME CARE VISIT (OUTPATIENT)
Dept: HOME HEALTH SERVICES | Facility: HOME HEALTH | Age: 68
End: 2023-09-30
Payer: MEDICARE

## 2023-09-30 ENCOUNTER — HOME CARE VISIT (OUTPATIENT)
Facility: HOME HEALTH | Age: 68
End: 2023-09-30
Payer: MEDICARE

## 2023-09-30 DIAGNOSIS — Z99.2 ESRD ON PERITONEAL DIALYSIS (HCC): Primary | ICD-10-CM

## 2023-09-30 DIAGNOSIS — N18.6 ESRD ON PERITONEAL DIALYSIS (HCC): Primary | ICD-10-CM

## 2023-09-30 DIAGNOSIS — E87.1 HYPONATREMIA: ICD-10-CM

## 2023-09-30 DIAGNOSIS — R53.1 GENERAL WEAKNESS: ICD-10-CM

## 2023-09-30 DIAGNOSIS — R11.2 NAUSEA AND VOMITING, UNSPECIFIED VOMITING TYPE: ICD-10-CM

## 2023-09-30 LAB
ALBUMIN SERPL-MCNC: 1.4 G/DL (ref 3.5–5)
ALBUMIN/GLOB SERPL: 0.2 (ref 1.1–2.2)
ALP SERPL-CCNC: 488 U/L (ref 45–117)
ALT SERPL-CCNC: ABNORMAL U/L (ref 12–78)
ANION GAP SERPL CALC-SCNC: 7 MMOL/L (ref 5–15)
AST SERPL-CCNC: ABNORMAL U/L (ref 15–37)
BASOPHILS # BLD: 0.1 K/UL (ref 0–0.1)
BASOPHILS NFR BLD: 1 % (ref 0–1)
BILIRUB SERPL-MCNC: 0.4 MG/DL (ref 0.2–1)
BUN SERPL-MCNC: 58 MG/DL (ref 6–20)
BUN/CREAT SERPL: 6 (ref 12–20)
CALCIUM SERPL-MCNC: 7.4 MG/DL (ref 8.5–10.1)
CHLORIDE SERPL-SCNC: 87 MMOL/L (ref 97–108)
CO2 SERPL-SCNC: 22 MMOL/L (ref 21–32)
COMMENT:: NORMAL
CREAT SERPL-MCNC: 9.19 MG/DL (ref 0.55–1.02)
DIFFERENTIAL METHOD BLD: ABNORMAL
EOSINOPHIL # BLD: 0.2 K/UL (ref 0–0.4)
EOSINOPHIL NFR BLD: 1 % (ref 0–7)
ERYTHROCYTE [DISTWIDTH] IN BLOOD BY AUTOMATED COUNT: 16.9 % (ref 11.5–14.5)
GLOBULIN SER CALC-MCNC: 6.7 G/DL (ref 2–4)
GLUCOSE SERPL-MCNC: 136 MG/DL (ref 65–100)
HCT VFR BLD AUTO: 37.3 % (ref 35–47)
HGB BLD-MCNC: 11.8 G/DL (ref 11.5–16)
IMM GRANULOCYTES # BLD AUTO: 0.1 K/UL (ref 0–0.04)
IMM GRANULOCYTES NFR BLD AUTO: 1 % (ref 0–0.5)
LYMPHOCYTES # BLD: 1.3 K/UL (ref 0.8–3.5)
LYMPHOCYTES NFR BLD: 10 % (ref 12–49)
MAGNESIUM SERPL-MCNC: NORMAL MG/DL (ref 1.6–2.4)
MAGNESIUM SERPL-MCNC: NORMAL MG/DL (ref 1.6–2.4)
MCH RBC QN AUTO: 28.9 PG (ref 26–34)
MCHC RBC AUTO-ENTMCNC: 31.6 G/DL (ref 30–36.5)
MCV RBC AUTO: 91.4 FL (ref 80–99)
MONOCYTES # BLD: 0.5 K/UL (ref 0–1)
MONOCYTES NFR BLD: 4 % (ref 5–13)
NEUTS SEG # BLD: 11.7 K/UL (ref 1.8–8)
NEUTS SEG NFR BLD: 84 % (ref 32–75)
NRBC # BLD: 0 K/UL (ref 0–0.01)
NRBC BLD-RTO: 0 PER 100 WBC
PLATELET # BLD AUTO: 357 K/UL (ref 150–400)
PMV BLD AUTO: 10.2 FL (ref 8.9–12.9)
POTASSIUM SERPL-SCNC: 3.7 MMOL/L (ref 3.5–5.1)
POTASSIUM SERPL-SCNC: ABNORMAL MMOL/L (ref 3.5–5.1)
PROT SERPL-MCNC: 8.1 G/DL (ref 6.4–8.2)
RBC # BLD AUTO: 4.08 M/UL (ref 3.8–5.2)
SODIUM SERPL-SCNC: 116 MMOL/L (ref 136–145)
SODIUM SERPL-SCNC: 126 MMOL/L (ref 136–145)
SPECIMEN HOLD: NORMAL
TROPONIN I SERPL HS-MCNC: 185 NG/L (ref 0–51)
WBC # BLD AUTO: 13.9 K/UL (ref 3.6–11)

## 2023-09-30 PROCEDURE — 80053 COMPREHEN METABOLIC PANEL: CPT

## 2023-09-30 PROCEDURE — 6370000000 HC RX 637 (ALT 250 FOR IP): Performed by: FAMILY MEDICINE

## 2023-09-30 PROCEDURE — 74176 CT ABD & PELVIS W/O CONTRAST: CPT

## 2023-09-30 PROCEDURE — 36415 COLL VENOUS BLD VENIPUNCTURE: CPT

## 2023-09-30 PROCEDURE — 84132 ASSAY OF SERUM POTASSIUM: CPT

## 2023-09-30 PROCEDURE — 6360000002 HC RX W HCPCS: Performed by: FAMILY MEDICINE

## 2023-09-30 PROCEDURE — 85025 COMPLETE CBC W/AUTO DIFF WBC: CPT

## 2023-09-30 PROCEDURE — 2060000000 HC ICU INTERMEDIATE R&B

## 2023-09-30 PROCEDURE — 70450 CT HEAD/BRAIN W/O DYE: CPT

## 2023-09-30 PROCEDURE — 84295 ASSAY OF SERUM SODIUM: CPT

## 2023-09-30 PROCEDURE — 93005 ELECTROCARDIOGRAM TRACING: CPT | Performed by: STUDENT IN AN ORGANIZED HEALTH CARE EDUCATION/TRAINING PROGRAM

## 2023-09-30 PROCEDURE — 71045 X-RAY EXAM CHEST 1 VIEW: CPT

## 2023-09-30 PROCEDURE — 96374 THER/PROPH/DIAG INJ IV PUSH: CPT

## 2023-09-30 PROCEDURE — 84484 ASSAY OF TROPONIN QUANT: CPT

## 2023-09-30 PROCEDURE — 2580000003 HC RX 258: Performed by: FAMILY MEDICINE

## 2023-09-30 PROCEDURE — 6360000002 HC RX W HCPCS: Performed by: STUDENT IN AN ORGANIZED HEALTH CARE EDUCATION/TRAINING PROGRAM

## 2023-09-30 PROCEDURE — 83735 ASSAY OF MAGNESIUM: CPT

## 2023-09-30 PROCEDURE — 99285 EMERGENCY DEPT VISIT HI MDM: CPT

## 2023-09-30 RX ORDER — ONDANSETRON 4 MG/1
4 TABLET, ORALLY DISINTEGRATING ORAL EVERY 8 HOURS PRN
Status: DISCONTINUED | OUTPATIENT
Start: 2023-09-30 | End: 2023-10-01

## 2023-09-30 RX ORDER — ALBUTEROL SULFATE 90 UG/1
2 AEROSOL, METERED RESPIRATORY (INHALATION) EVERY 6 HOURS PRN
Status: DISCONTINUED | OUTPATIENT
Start: 2023-09-30 | End: 2023-09-30 | Stop reason: CLARIF

## 2023-09-30 RX ORDER — SODIUM CHLORIDE 0.9 % (FLUSH) 0.9 %
5-40 SYRINGE (ML) INJECTION PRN
Status: DISCONTINUED | OUTPATIENT
Start: 2023-09-30 | End: 2023-10-06 | Stop reason: HOSPADM

## 2023-09-30 RX ORDER — ASPIRIN 81 MG/1
81 TABLET, CHEWABLE ORAL DAILY
Status: DISCONTINUED | OUTPATIENT
Start: 2023-09-30 | End: 2023-10-06 | Stop reason: HOSPADM

## 2023-09-30 RX ORDER — DULOXETIN HYDROCHLORIDE 20 MG/1
20 CAPSULE, DELAYED RELEASE ORAL 2 TIMES DAILY
Status: DISCONTINUED | OUTPATIENT
Start: 2023-09-30 | End: 2023-10-06 | Stop reason: HOSPADM

## 2023-09-30 RX ORDER — ROSUVASTATIN CALCIUM 40 MG/1
40 TABLET, COATED ORAL DAILY
Status: DISCONTINUED | OUTPATIENT
Start: 2023-10-01 | End: 2023-10-04

## 2023-09-30 RX ORDER — ONDANSETRON 2 MG/ML
4 INJECTION INTRAMUSCULAR; INTRAVENOUS EVERY 6 HOURS PRN
Status: DISCONTINUED | OUTPATIENT
Start: 2023-09-30 | End: 2023-10-01

## 2023-09-30 RX ORDER — SODIUM CHLORIDE 9 MG/ML
INJECTION, SOLUTION INTRAVENOUS PRN
Status: DISCONTINUED | OUTPATIENT
Start: 2023-09-30 | End: 2023-10-06 | Stop reason: HOSPADM

## 2023-09-30 RX ORDER — ALBUTEROL SULFATE 2.5 MG/3ML
2.5 SOLUTION RESPIRATORY (INHALATION) EVERY 6 HOURS PRN
Status: DISCONTINUED | OUTPATIENT
Start: 2023-09-30 | End: 2023-10-04

## 2023-09-30 RX ORDER — ONDANSETRON 2 MG/ML
4 INJECTION INTRAMUSCULAR; INTRAVENOUS ONCE
Status: COMPLETED | OUTPATIENT
Start: 2023-09-30 | End: 2023-09-30

## 2023-09-30 RX ORDER — HEPARIN SODIUM 5000 [USP'U]/ML
5000 INJECTION, SOLUTION INTRAVENOUS; SUBCUTANEOUS EVERY 8 HOURS SCHEDULED
Status: DISCONTINUED | OUTPATIENT
Start: 2023-09-30 | End: 2023-10-06 | Stop reason: HOSPADM

## 2023-09-30 RX ORDER — LANOLIN ALCOHOL/MO/W.PET/CERES
400 CREAM (GRAM) TOPICAL 2 TIMES DAILY
Status: DISCONTINUED | OUTPATIENT
Start: 2023-09-30 | End: 2023-10-06 | Stop reason: HOSPADM

## 2023-09-30 RX ORDER — FOLIC ACID 1 MG/1
1 TABLET ORAL DAILY
Status: DISCONTINUED | OUTPATIENT
Start: 2023-09-30 | End: 2023-10-06 | Stop reason: HOSPADM

## 2023-09-30 RX ORDER — CARVEDILOL 3.12 MG/1
6.25 TABLET ORAL 2 TIMES DAILY WITH MEALS
Status: DISCONTINUED | OUTPATIENT
Start: 2023-09-30 | End: 2023-10-01

## 2023-09-30 RX ORDER — LANOLIN ALCOHOL/MO/W.PET/CERES
6 CREAM (GRAM) TOPICAL NIGHTLY PRN
Status: DISCONTINUED | OUTPATIENT
Start: 2023-09-30 | End: 2023-10-06 | Stop reason: HOSPADM

## 2023-09-30 RX ORDER — SODIUM CHLORIDE, SODIUM LACTATE, CALCIUM CHLORIDE, MAGNESIUM CHLORIDE AND DEXTROSE 2.5; 538; 448; 18.3; 5.08 G/100ML; MG/100ML; MG/100ML; MG/100ML; MG/100ML
6000 INJECTION, SOLUTION INTRAPERITONEAL CONTINUOUS
Status: DISCONTINUED | OUTPATIENT
Start: 2023-09-30 | End: 2023-10-03

## 2023-09-30 RX ORDER — METHOCARBAMOL 500 MG/1
500 TABLET, FILM COATED ORAL 3 TIMES DAILY PRN
Status: DISPENSED | OUTPATIENT
Start: 2023-09-30 | End: 2023-10-01

## 2023-09-30 RX ORDER — POLYETHYLENE GLYCOL 3350 17 G/17G
17 POWDER, FOR SOLUTION ORAL DAILY PRN
Status: DISCONTINUED | OUTPATIENT
Start: 2023-09-30 | End: 2023-10-06 | Stop reason: HOSPADM

## 2023-09-30 RX ORDER — GENTAMICIN SULFATE 1 MG/G
CREAM TOPICAL PRN
Status: DISCONTINUED | OUTPATIENT
Start: 2023-09-30 | End: 2023-10-01 | Stop reason: SDUPTHER

## 2023-09-30 RX ORDER — AMIODARONE HYDROCHLORIDE 200 MG/1
200 TABLET ORAL 2 TIMES DAILY
Status: DISCONTINUED | OUTPATIENT
Start: 2023-09-30 | End: 2023-09-30 | Stop reason: ALTCHOICE

## 2023-09-30 RX ORDER — INSULIN GLARGINE 100 [IU]/ML
20 INJECTION, SOLUTION SUBCUTANEOUS NIGHTLY
Status: DISCONTINUED | OUTPATIENT
Start: 2023-09-30 | End: 2023-10-06 | Stop reason: HOSPADM

## 2023-09-30 RX ORDER — SODIUM CHLORIDE 0.9 % (FLUSH) 0.9 %
5-40 SYRINGE (ML) INJECTION EVERY 12 HOURS SCHEDULED
Status: DISCONTINUED | OUTPATIENT
Start: 2023-09-30 | End: 2023-10-06 | Stop reason: HOSPADM

## 2023-09-30 RX ORDER — SIMETHICONE 80 MG
80 TABLET,CHEWABLE ORAL 3 TIMES DAILY PRN
Status: DISCONTINUED | OUTPATIENT
Start: 2023-09-30 | End: 2023-10-06 | Stop reason: HOSPADM

## 2023-09-30 RX ORDER — MONTELUKAST SODIUM 10 MG/1
10 TABLET ORAL NIGHTLY
Status: DISCONTINUED | OUTPATIENT
Start: 2023-09-30 | End: 2023-10-06 | Stop reason: HOSPADM

## 2023-09-30 RX ORDER — ACETAMINOPHEN 325 MG/1
650 TABLET ORAL EVERY 6 HOURS PRN
Status: DISCONTINUED | OUTPATIENT
Start: 2023-09-30 | End: 2023-10-06 | Stop reason: HOSPADM

## 2023-09-30 RX ORDER — IPRATROPIUM BROMIDE AND ALBUTEROL SULFATE 2.5; .5 MG/3ML; MG/3ML
1 SOLUTION RESPIRATORY (INHALATION) EVERY 6 HOURS PRN
Status: DISCONTINUED | OUTPATIENT
Start: 2023-09-30 | End: 2023-10-04

## 2023-09-30 RX ORDER — ACETAMINOPHEN 650 MG/1
650 SUPPOSITORY RECTAL EVERY 6 HOURS PRN
Status: DISCONTINUED | OUTPATIENT
Start: 2023-09-30 | End: 2023-10-06 | Stop reason: HOSPADM

## 2023-09-30 RX ORDER — PANTOPRAZOLE SODIUM 40 MG/1
40 TABLET, DELAYED RELEASE ORAL DAILY
Status: DISCONTINUED | OUTPATIENT
Start: 2023-10-01 | End: 2023-10-06 | Stop reason: HOSPADM

## 2023-09-30 RX ADMIN — SODIUM CHLORIDE, PRESERVATIVE FREE 10 ML: 5 INJECTION INTRAVENOUS at 22:18

## 2023-09-30 RX ADMIN — ONDANSETRON 4 MG: 2 INJECTION INTRAMUSCULAR; INTRAVENOUS at 18:14

## 2023-09-30 RX ADMIN — SIMETHICONE 80 MG: 80 TABLET, CHEWABLE ORAL at 22:09

## 2023-09-30 RX ADMIN — Medication 6 MG: at 23:05

## 2023-09-30 RX ADMIN — Medication 1 MG: at 23:05

## 2023-09-30 RX ADMIN — METHOCARBAMOL TABLETS 500 MG: 500 TABLET, COATED ORAL at 23:02

## 2023-09-30 RX ADMIN — MAGNESIUM OXIDE TAB 400 MG (241.3 MG ELEMENTAL MG) 400 MG: 400 (241.3 MG) TAB at 23:04

## 2023-09-30 RX ADMIN — HEPARIN SODIUM 5000 UNITS: 5000 INJECTION INTRAVENOUS; SUBCUTANEOUS at 22:13

## 2023-09-30 RX ADMIN — MONTELUKAST 10 MG: 10 TABLET, FILM COATED ORAL at 23:05

## 2023-09-30 RX ADMIN — CARVEDILOL 6.25 MG: 3.12 TABLET, FILM COATED ORAL at 23:02

## 2023-09-30 RX ADMIN — DULOXETINE HYDROCHLORIDE 20 MG: 20 CAPSULE, DELAYED RELEASE ORAL at 23:07

## 2023-09-30 RX ADMIN — ASPIRIN 81 MG CHEWABLE TABLET 81 MG: 81 TABLET CHEWABLE at 23:02

## 2023-09-30 ASSESSMENT — ENCOUNTER SYMPTOMS
VOMITING: 1
COUGH: 0
DIARRHEA: 1
NAUSEA: 1
EYE REDNESS: 0
ABDOMINAL PAIN: 0
CHEST TIGHTNESS: 0
SHORTNESS OF BREATH: 0
VOMITING: 0
RHINORRHEA: 0
EYE DISCHARGE: 0

## 2023-09-30 ASSESSMENT — PAIN SCALES - GENERAL: PAINLEVEL_OUTOF10: 7

## 2023-09-30 ASSESSMENT — PAIN - FUNCTIONAL ASSESSMENT: PAIN_FUNCTIONAL_ASSESSMENT: NONE - DENIES PAIN

## 2023-09-30 NOTE — ED TRIAGE NOTES
Pt presents to ED via EMS from home reporting generalized weakness and nausea that has been progressing for two weeks. Pt had cardiac bypass 3 weeks ago, denies chest pain today. Pt also performs peritoneal dialysis at home but reports her machine hasn't been working the past two days. Pt ambulates with walker at baseline.

## 2023-09-30 NOTE — CASE COMMUNICATION
Nursing called [facility] on the evening of 9/29 and we arranged a visit for 10am.  When nursing arrived, there was no answer at the door and the patient also could not be reached by phone. Nursing visit for today was missed.

## 2023-09-30 NOTE — ED NOTES
Assumed care of patient, changed into gown, peritoneal dialysis catheter present on abdomen, states it has been two days since she has been able to run secondary to her machine not working, call bell within reach     Srinath Marley RN  09/30/23 [id number]

## 2023-10-01 LAB
ALBUMIN SERPL-MCNC: 1.4 G/DL (ref 3.5–5)
ALBUMIN/GLOB SERPL: 0.3 (ref 1.1–2.2)
ALP SERPL-CCNC: 450 U/L (ref 45–117)
ALT SERPL-CCNC: 11 U/L (ref 12–78)
ANION GAP SERPL CALC-SCNC: 14 MMOL/L (ref 5–15)
APPEARANCE UR: ABNORMAL
APTT PPP: 30.4 SEC (ref 22.1–31)
AST SERPL-CCNC: 35 U/L (ref 15–37)
BACTERIA URNS QL MICRO: ABNORMAL /HPF
BASOPHILS # BLD: 0.1 K/UL (ref 0–0.1)
BASOPHILS NFR BLD: 1 % (ref 0–1)
BILIRUB SERPL-MCNC: 0.4 MG/DL (ref 0.2–1)
BILIRUB UR QL: NEGATIVE
BUN SERPL-MCNC: 58 MG/DL (ref 6–20)
BUN/CREAT SERPL: 6 (ref 12–20)
CALCIUM SERPL-MCNC: 7.6 MG/DL (ref 8.5–10.1)
CHLORIDE SERPL-SCNC: 89 MMOL/L (ref 97–108)
CO2 SERPL-SCNC: 26 MMOL/L (ref 21–32)
COLOR UR: ABNORMAL
COMMENT:: NORMAL
COMMENT:: NORMAL
CREAT SERPL-MCNC: 9.53 MG/DL (ref 0.55–1.02)
DIFFERENTIAL METHOD BLD: ABNORMAL
EKG ATRIAL RATE: 83 BPM
EKG DIAGNOSIS: NORMAL
EKG P AXIS: 47 DEGREES
EKG P-R INTERVAL: 172 MS
EKG Q-T INTERVAL: 440 MS
EKG QRS DURATION: 96 MS
EKG QTC CALCULATION (BAZETT): 517 MS
EKG R AXIS: -52 DEGREES
EKG T AXIS: 117 DEGREES
EKG VENTRICULAR RATE: 83 BPM
EOSINOPHIL # BLD: 0 K/UL (ref 0–0.4)
EOSINOPHIL NFR BLD: 0 % (ref 0–7)
EPITH CASTS URNS QL MICRO: ABNORMAL /LPF
ERYTHROCYTE [DISTWIDTH] IN BLOOD BY AUTOMATED COUNT: 17.1 % (ref 11.5–14.5)
GLOBULIN SER CALC-MCNC: 5.4 G/DL (ref 2–4)
GLUCOSE BLD STRIP.AUTO-MCNC: 140 MG/DL (ref 65–117)
GLUCOSE BLD STRIP.AUTO-MCNC: 180 MG/DL (ref 65–117)
GLUCOSE BLD STRIP.AUTO-MCNC: 203 MG/DL (ref 65–117)
GLUCOSE SERPL-MCNC: 152 MG/DL (ref 65–100)
GLUCOSE UR STRIP.AUTO-MCNC: NEGATIVE MG/DL
HBV SURFACE AG SER QL: <0.1 INDEX
HBV SURFACE AG SER QL: NEGATIVE
HCT VFR BLD AUTO: 32.9 % (ref 35–47)
HGB BLD-MCNC: 10.3 G/DL (ref 11.5–16)
HGB UR QL STRIP: ABNORMAL
IMM GRANULOCYTES # BLD AUTO: 0.1 K/UL (ref 0–0.04)
IMM GRANULOCYTES NFR BLD AUTO: 1 % (ref 0–0.5)
INR PPP: 1.2 (ref 0.9–1.1)
KETONES UR QL STRIP.AUTO: ABNORMAL MG/DL
LACTATE SERPL-SCNC: 2.4 MMOL/L (ref 0.4–2)
LACTATE SERPL-SCNC: 4.8 MMOL/L (ref 0.4–2)
LACTATE SERPL-SCNC: 4.8 MMOL/L (ref 0.4–2)
LEUKOCYTE ESTERASE UR QL STRIP.AUTO: ABNORMAL
LYMPHOCYTES # BLD: 0.9 K/UL (ref 0.8–3.5)
LYMPHOCYTES NFR BLD: 7 % (ref 12–49)
MAGNESIUM SERPL-MCNC: 4.6 MG/DL (ref 1.6–2.4)
MCH RBC QN AUTO: 28.7 PG (ref 26–34)
MCHC RBC AUTO-ENTMCNC: 31.3 G/DL (ref 30–36.5)
MCV RBC AUTO: 91.6 FL (ref 80–99)
MONOCYTES # BLD: 0.5 K/UL (ref 0–1)
MONOCYTES NFR BLD: 4 % (ref 5–13)
MUCOUS THREADS URNS QL MICRO: ABNORMAL /LPF
NEUTS SEG # BLD: 11.1 K/UL (ref 1.8–8)
NEUTS SEG NFR BLD: 87 % (ref 32–75)
NITRITE UR QL STRIP.AUTO: NEGATIVE
NRBC # BLD: 0 K/UL (ref 0–0.01)
NRBC BLD-RTO: 0 PER 100 WBC
PH UR STRIP: 5 (ref 5–8)
PHOSPHATE SERPL-MCNC: 6.2 MG/DL (ref 2.6–4.7)
PLATELET # BLD AUTO: 351 K/UL (ref 150–400)
PMV BLD AUTO: 11.2 FL (ref 8.9–12.9)
POTASSIUM SERPL-SCNC: 3.8 MMOL/L (ref 3.5–5.1)
PROT SERPL-MCNC: 6.8 G/DL (ref 6.4–8.2)
PROT UR STRIP-MCNC: >300 MG/DL
PROTHROMBIN TIME: 12.3 SEC (ref 9–11.1)
RBC # BLD AUTO: 3.59 M/UL (ref 3.8–5.2)
RBC #/AREA URNS HPF: ABNORMAL /HPF (ref 0–5)
SERVICE CMNT-IMP: ABNORMAL
SODIUM SERPL-SCNC: 129 MMOL/L (ref 136–145)
SP GR UR REFRACTOMETRY: 1.02 (ref 1–1.03)
SPECIMEN HOLD: NORMAL
SPECIMEN HOLD: NORMAL
THERAPEUTIC RANGE: NORMAL SECS (ref 58–77)
URINE CULTURE IF INDICATED: ABNORMAL
UROBILINOGEN UR QL STRIP.AUTO: 0.2 EU/DL (ref 0.2–1)
WBC # BLD AUTO: 12.6 K/UL (ref 3.6–11)
WBC URNS QL MICRO: >100 /HPF (ref 0–4)

## 2023-10-01 PROCEDURE — 85610 PROTHROMBIN TIME: CPT

## 2023-10-01 PROCEDURE — 6360000002 HC RX W HCPCS: Performed by: HOSPITALIST

## 2023-10-01 PROCEDURE — 2580000003 HC RX 258: Performed by: HOSPITALIST

## 2023-10-01 PROCEDURE — 2580000003 HC RX 258: Performed by: NURSE PRACTITIONER

## 2023-10-01 PROCEDURE — 87077 CULTURE AEROBIC IDENTIFY: CPT

## 2023-10-01 PROCEDURE — 84100 ASSAY OF PHOSPHORUS: CPT

## 2023-10-01 PROCEDURE — P9047 ALBUMIN (HUMAN), 25%, 50ML: HCPCS | Performed by: HOSPITALIST

## 2023-10-01 PROCEDURE — 36415 COLL VENOUS BLD VENIPUNCTURE: CPT

## 2023-10-01 PROCEDURE — 87086 URINE CULTURE/COLONY COUNT: CPT

## 2023-10-01 PROCEDURE — 2060000000 HC ICU INTERMEDIATE R&B

## 2023-10-01 PROCEDURE — 83735 ASSAY OF MAGNESIUM: CPT

## 2023-10-01 PROCEDURE — 85730 THROMBOPLASTIN TIME PARTIAL: CPT

## 2023-10-01 PROCEDURE — 85025 COMPLETE CBC W/AUTO DIFF WBC: CPT

## 2023-10-01 PROCEDURE — 3E1M39Z IRRIGATION OF PERITONEAL CAVITY USING DIALYSATE, PERCUTANEOUS APPROACH: ICD-10-PCS | Performed by: HOSPITALIST

## 2023-10-01 PROCEDURE — 6370000000 HC RX 637 (ALT 250 FOR IP): Performed by: NURSE PRACTITIONER

## 2023-10-01 PROCEDURE — 81001 URINALYSIS AUTO W/SCOPE: CPT

## 2023-10-01 PROCEDURE — 6360000002 HC RX W HCPCS: Performed by: FAMILY MEDICINE

## 2023-10-01 PROCEDURE — 82962 GLUCOSE BLOOD TEST: CPT

## 2023-10-01 PROCEDURE — 83605 ASSAY OF LACTIC ACID: CPT

## 2023-10-01 PROCEDURE — 6370000000 HC RX 637 (ALT 250 FOR IP): Performed by: HOSPITALIST

## 2023-10-01 PROCEDURE — 87340 HEPATITIS B SURFACE AG IA: CPT

## 2023-10-01 PROCEDURE — 6370000000 HC RX 637 (ALT 250 FOR IP): Performed by: FAMILY MEDICINE

## 2023-10-01 PROCEDURE — 2580000003 HC RX 258: Performed by: FAMILY MEDICINE

## 2023-10-01 PROCEDURE — 87186 SC STD MICRODIL/AGAR DIL: CPT

## 2023-10-01 PROCEDURE — 90945 DIALYSIS ONE EVALUATION: CPT

## 2023-10-01 PROCEDURE — 87040 BLOOD CULTURE FOR BACTERIA: CPT

## 2023-10-01 PROCEDURE — 80053 COMPREHEN METABOLIC PANEL: CPT

## 2023-10-01 RX ORDER — MIDODRINE HYDROCHLORIDE 5 MG/1
5 TABLET ORAL
Status: DISCONTINUED | OUTPATIENT
Start: 2023-10-01 | End: 2023-10-02

## 2023-10-01 RX ORDER — GENTAMICIN SULFATE 1 MG/G
CREAM TOPICAL PRN
Status: DISCONTINUED | OUTPATIENT
Start: 2023-10-01 | End: 2023-10-06 | Stop reason: HOSPADM

## 2023-10-01 RX ORDER — PROCHLORPERAZINE EDISYLATE 5 MG/ML
5 INJECTION INTRAMUSCULAR; INTRAVENOUS ONCE
Status: COMPLETED | OUTPATIENT
Start: 2023-10-01 | End: 2023-10-01

## 2023-10-01 RX ORDER — ALBUMIN (HUMAN) 12.5 G/50ML
25 SOLUTION INTRAVENOUS ONCE
Status: COMPLETED | OUTPATIENT
Start: 2023-10-01 | End: 2023-10-01

## 2023-10-01 RX ORDER — HYDROMORPHONE HYDROCHLORIDE 1 MG/ML
0.25 INJECTION, SOLUTION INTRAMUSCULAR; INTRAVENOUS; SUBCUTANEOUS EVERY 4 HOURS PRN
Status: DISCONTINUED | OUTPATIENT
Start: 2023-10-01 | End: 2023-10-06 | Stop reason: HOSPADM

## 2023-10-01 RX ORDER — CARVEDILOL 3.12 MG/1
3.12 TABLET ORAL 2 TIMES DAILY WITH MEALS
Status: DISCONTINUED | OUTPATIENT
Start: 2023-10-01 | End: 2023-10-06 | Stop reason: HOSPADM

## 2023-10-01 RX ADMIN — PROCHLORPERAZINE EDISYLATE 5 MG: 5 INJECTION INTRAMUSCULAR; INTRAVENOUS at 02:56

## 2023-10-01 RX ADMIN — MAGNESIUM OXIDE TAB 400 MG (241.3 MG ELEMENTAL MG) 400 MG: 400 (241.3 MG) TAB at 21:02

## 2023-10-01 RX ADMIN — SODIUM CHLORIDE, SODIUM LACTATE, CALCIUM CHLORIDE, MAGNESIUM CHLORIDE AND DEXTROSE 6000 ML: 2.5; 538; 448; 18.3; 5.08 INJECTION, SOLUTION INTRAPERITONEAL at 00:49

## 2023-10-01 RX ADMIN — SODIUM CHLORIDE, PRESERVATIVE FREE 10 ML: 5 INJECTION INTRAVENOUS at 21:02

## 2023-10-01 RX ADMIN — HEPARIN SODIUM 5000 UNITS: 5000 INJECTION INTRAVENOUS; SUBCUTANEOUS at 21:07

## 2023-10-01 RX ADMIN — MONTELUKAST 10 MG: 10 TABLET, FILM COATED ORAL at 21:02

## 2023-10-01 RX ADMIN — SODIUM CHLORIDE, SODIUM LACTATE, CALCIUM CHLORIDE, MAGNESIUM CHLORIDE AND DEXTROSE 6000 ML: 2.5; 538; 448; 18.3; 5.08 INJECTION, SOLUTION INTRAPERITONEAL at 19:16

## 2023-10-01 RX ADMIN — PIPERACILLIN AND TAZOBACTAM 4500 MG: 4; .5 INJECTION, POWDER, LYOPHILIZED, FOR SOLUTION INTRAVENOUS at 14:26

## 2023-10-01 RX ADMIN — SIMETHICONE 80 MG: 80 TABLET, CHEWABLE ORAL at 03:32

## 2023-10-01 RX ADMIN — ALBUMIN (HUMAN) 25 G: 0.25 INJECTION, SOLUTION INTRAVENOUS at 17:53

## 2023-10-01 RX ADMIN — HEPARIN SODIUM 5000 UNITS: 5000 INJECTION INTRAVENOUS; SUBCUTANEOUS at 14:26

## 2023-10-01 RX ADMIN — HEPARIN SODIUM 5000 UNITS: 5000 INJECTION INTRAVENOUS; SUBCUTANEOUS at 05:53

## 2023-10-01 RX ADMIN — CARVEDILOL 3.12 MG: 3.12 TABLET, FILM COATED ORAL at 16:10

## 2023-10-01 RX ADMIN — INSULIN GLARGINE 20 UNITS: 100 INJECTION, SOLUTION SUBCUTANEOUS at 21:01

## 2023-10-01 RX ADMIN — PIPERACILLIN AND TAZOBACTAM 3375 MG: 3; .375 INJECTION, POWDER, LYOPHILIZED, FOR SOLUTION INTRAVENOUS at 21:01

## 2023-10-01 RX ADMIN — HYDROMORPHONE HYDROCHLORIDE 0.25 MG: 1 INJECTION, SOLUTION INTRAMUSCULAR; INTRAVENOUS; SUBCUTANEOUS at 02:57

## 2023-10-01 RX ADMIN — MIDODRINE HYDROCHLORIDE 5 MG: 5 TABLET ORAL at 17:53

## 2023-10-01 RX ADMIN — METHOCARBAMOL TABLETS 500 MG: 500 TABLET, COATED ORAL at 03:32

## 2023-10-01 RX ADMIN — DULOXETINE HYDROCHLORIDE 20 MG: 20 CAPSULE, DELAYED RELEASE ORAL at 21:02

## 2023-10-01 RX ADMIN — GENTAMICIN SULFATE: 1 CREAM TOPICAL at 19:15

## 2023-10-01 RX ADMIN — INSULIN GLARGINE 20 UNITS: 100 INJECTION, SOLUTION SUBCUTANEOUS at 00:06

## 2023-10-01 RX ADMIN — SODIUM CHLORIDE, SODIUM LACTATE, CALCIUM CHLORIDE, MAGNESIUM CHLORIDE AND DEXTROSE 6000 ML: 2.5; 538; 448; 18.3; 5.08 INJECTION, SOLUTION INTRAPERITONEAL at 19:15

## 2023-10-01 ASSESSMENT — PAIN SCALES - GENERAL
PAINLEVEL_OUTOF10: 8
PAINLEVEL_OUTOF10: 6
PAINLEVEL_OUTOF10: 8

## 2023-10-01 ASSESSMENT — PAIN DESCRIPTION - PAIN TYPE: TYPE: SURGICAL PAIN

## 2023-10-01 ASSESSMENT — PAIN DESCRIPTION - LOCATION: LOCATION: BACK

## 2023-10-01 ASSESSMENT — PAIN DESCRIPTION - DESCRIPTORS: DESCRIPTORS: ACHING

## 2023-10-01 NOTE — PROGRESS NOTES
(CYMBALTA) extended release capsule 20 mg  20 mg Oral BID    folic acid (FOLVITE) tablet 1 mg  1 mg Oral Daily    insulin glargine (LANTUS) injection vial 20 Units  20 Units SubCUTAneous Nightly    ipratropium 0.5 mg-albuterol 2.5 mg (DUONEB) nebulizer solution 1 Dose  1 Dose Inhalation Q6H PRN    magnesium oxide (MAG-OX) tablet 400 mg  400 mg Oral BID    melatonin tablet 6 mg  6 mg Oral Nightly PRN    methocarbamol (ROBAXIN) tablet 500 mg  500 mg Oral TID PRN    montelukast (SINGULAIR) tablet 10 mg  10 mg Oral Nightly    pantoprazole (PROTONIX) tablet 40 mg  40 mg Oral Daily    rosuvastatin (CRESTOR) tablet 40 mg  40 mg Oral Daily    sodium chloride flush 0.9 % injection 5-40 mL  5-40 mL IntraVENous 2 times per day    sodium chloride flush 0.9 % injection 5-40 mL  5-40 mL IntraVENous PRN    0.9 % sodium chloride infusion   IntraVENous PRN    heparin (porcine) injection 5,000 Units  5,000 Units SubCUTAneous 3 times per day    ondansetron (ZOFRAN-ODT) disintegrating tablet 4 mg  4 mg Oral Q8H PRN    Or    ondansetron (ZOFRAN) injection 4 mg  4 mg IntraVENous Q6H PRN    polyethylene glycol (GLYCOLAX) packet 17 g  17 g Oral Daily PRN    acetaminophen (TYLENOL) tablet 650 mg  650 mg Oral Q6H PRN    Or    acetaminophen (TYLENOL) suppository 650 mg  650 mg Rectal Q6H PRN    albuterol (PROVENTIL) (2.5 MG/3ML) 0.083% nebulizer solution 2.5 mg  2.5 mg Nebulization Q6H PRN    simethicone (MYLICON) chewable tablet 80 mg  80 mg Oral TID PRN     Current Outpatient Medications   Medication Sig    ondansetron (ZOFRAN-ODT) 4 MG disintegrating tablet Take 1 tablet by mouth 3 times daily as needed for Nausea or Vomiting    acetaminophen (TYLENOL) 325 MG tablet Take 3 tablets by mouth in the morning and 3 tablets at noon and 3 tablets in the evening and 3 tablets before bedtime.     aspirin 81 MG chewable tablet Take 1 tablet by mouth daily    amiodarone (CORDARONE) 200 MG tablet Take 2 tablets by mouth 2 times daily for 14 days, THEN 2 tablets daily for 14 days. carvedilol (COREG) 6.25 MG tablet Take 1 tablet by mouth 2 times daily (with meals) Hold for HR < 60 or SBP < 110    melatonin 3 MG TABS tablet Take 2 tablets by mouth nightly as needed (insomnia)    magnesium oxide (MAG-OX) 400 (240 Mg) MG tablet Take 1 tablet by mouth 2 times daily    pantoprazole (PROTONIX) 40 MG tablet Take 1 tablet by mouth daily    Blood Glucose Monitoring Suppl (BLOOD GLUCOSE MONITOR SYSTEM) w/Device KIT 1 Device by Does not apply route 4 times daily (before meals and nightly) Pharmacist to identify preferred meter and strips. (Patient not taking: Reported on 9/19/2023)    blood glucose monitor strips Test 4 times a day before meals and at bedtime. Dispense sufficient amount for indicated testing frequency plus additional to accommodate PRN testing needs. Pharmacist to identify preferred brand. (Patient not taking: Reported on 9/19/2023)    Lancets 30G MISC Test BG 4 times a day before meals and at bedtime. Dispense sufficient amount for indicated testing frequency plus additional to accommodate PRN testing needs. Pharmacist to identify preferred brand.  (Patient not taking: Reported on 9/19/2023)    insulin glargine (LANTUS) 100 UNIT/ML injection vial Inject 20 Units into the skin nightly    folic acid (FOLVITE) 1 MG tablet Take 1 tablet by mouth daily    methocarbamol (ROBAXIN) 500 MG tablet Take 1 tablet by mouth 3 times daily    albuterol sulfate HFA (PROVENTIL;VENTOLIN;PROAIR) 108 (90 Base) MCG/ACT inhaler Inhale 2 puffs into the lungs every 6 hours as needed    diclofenac sodium (VOLTAREN) 1 % GEL Apply 4 g topically daily as needed for Pain Apply thin layer to back daily as needed for pain    DULoxetine (CYMBALTA) 20 MG extended release capsule Take 1 capsule by mouth 2 times daily    ipratropium-albuterol (DUONEB) 0.5-2.5 (3) MG/3ML SOLN nebulizer solution Inhale into the lungs every 6 hours as needed    montelukast (SINGULAIR) 10 MG tablet Take 1

## 2023-10-01 NOTE — ED NOTES
Dr Chilango Aragon aware of critical lactic via perfect serve by [facility], RN  10/01/23 6864

## 2023-10-01 NOTE — FLOWSHEET NOTE
CCPD Disconnect: 10/01/23 1047   Peritoneal Dialysis Catheter Mid lower abdomen   No placement date or time found. Catheter Location: Mid lower abdomen   Status Deaccessed   Dressing Status Clean, dry & intact   Dressing Gauze   Date of Last Dressing Change 10/01/23   Dialysis Type Continuous cycling   Catheter Care Given Yes  (2 minute Alcavis scrub prior to accessing PD catheter)   Post-Treatment (Cycler)   Average Dwell Time (Hours:Minutes) 1:16   Lost Dwell Time (Hours:Minutes) 0:34   Effluent Appearance Clear;Yellow;Fibrin   I Drain (mL) 23 mL   PD Output (mL) 72 mL  (idrain 23 + UF 49)     Primary RN SBAR: JANES Carpenter  Comments: Used cassette and bags discarded into red bio bag.

## 2023-10-01 NOTE — ED NOTES
Pt actively vomiting, MD aware. PRN zofran unable to be given due to frequency in last dose.  MD reports he will order an alternative      Johnathon Moser, JANES  09/30/23 1336

## 2023-10-01 NOTE — CONSULTS
NEPHROLOGY CONSULT NOTE     Patient: Christopher Emmanuel MRN: [id number]  PCP: Celsa Kirk MD   :     1955  Age:   70 y.o. Sex:  female      Referring physician: Rehan Lilly DO  Reason for consultation: 70 y.o. female with ESRD on PD, hyponatremia  Chief Complaint: Weakness, missed dialysis  Admission Date: 2023  5:41 PM  LOS: 0 days      ASSESSMENT and PLAN :   ESRD on PD  -[facility] nightly at home, patient of Dr[facility]  -Home Rx: 5X 2300 mL, 9.5-hour treatment time, 1000 mL icodextrin last fill  -Missed PD x2 days due to malfunctioning machine, has not spoken to her PD nurse  -Plan to resume home PD prescription tonight, will use 2.5% bag  -Discussed with PD nurse on-call will place on cycler tonight  -We will need coordinate with her home PD nurse/clinic to ensure she is able to resume PD at home on discharge  -Denies abdominal pain, fever, cloudy/bloody/fibrin effluent, redness/drainage at catheter site. Low suspicion for peritonitis.   -Gent cream to exit site    Hyponatremia  -Sodium 116 down from 137 on 2023, though sample grossly hemolyzed  -Not uncommon for dialysis patients to be mildly hyponatremic, though not to this extent  -Causative factors may include nausea with poor p.o. intake, ongoing free water intake, SSRI use, decreased free water clearance  -Await repeat sample, if confirmed to be low:  -Will need 3% saline  -Initiate hyponatremia evaluation   -Check urine chemistries: Sodium, potassium, osmolality  -Send serum osmolality, TSH, a.m. cortisol  -Serial sodium every 6 hours, goal correction 4 to 6 mmol/L in 24 hours    Anemia of CKD  -Hemoglobin 11.8, up from 8.2  -Continue to monitor for now    CAD  -Status post CABG 2023    Hypertension  -Resume home antihypertensive regimen    Diabetes  COPD  Hyperlipidemia    Care Plan discussed with: Patient, Dr. Massimo Genao (nephrology attending)        Thank you for consulting [facility] in the catheter  Musculoskeletal:      Right lower leg: No edema. Left lower leg: No edema. Skin:     General: Skin is warm and dry. Capillary Refill: Capillary refill takes less than 2 seconds. Comments: Healing sternotomy site   Neurological:      Mental Status: She is alert and oriented to person, place, and time. Mental status is at baseline.    Psychiatric:         Mood and Affect: Mood normal.         Behavior: Behavior normal.         Laboratory Results:    Recent Labs     09/30/23  1800   *   K HEMOLYZED,RECOLLECT REQUESTED   CL 87*   CO2 22   BUN 58*   MG HEMOLYZED,RECOLLECT REQUESTED   ALT HEMOLYZED,RECOLLECT REQUESTED     Recent Labs     09/30/23  1800   WBC 13.9*   HGB 11.8   HCT 37.3        No components found for: \"COLOR\", \"APPRN\", \"SPGRU\", \"REFSG\", \"NOBLE\", \"PROTU\", \"GLUCU\", \"KETU\", \"BILU\", \"UROU\", \"FRANNIE\", \"LEUKU\", \"GLUKE\", \"EPSU\", \"BACTU\", \"WBCU\", \"RBCU\", \"CASTS\", \"UCRY\"  Recent Results (from the past 24 hour(s))   CBC with Auto Differential    Collection Time: 09/30/23  6:00 PM   Result Value Ref Range    WBC 13.9 (H) 3.6 - 11.0 K/uL    RBC 4.08 3.80 - 5.20 M/uL    Hemoglobin 11.8 11.5 - 16.0 g/dL    Hematocrit 37.3 35.0 - 47.0 %    MCV 91.4 80.0 - 99.0 FL    MCH 28.9 26.0 - 34.0 PG    MCHC 31.6 30.0 - 36.5 g/dL    RDW 16.9 (H) 11.5 - 14.5 %    Platelets 261 971 - 882 K/uL    MPV 10.2 8.9 - 12.9 FL    Nucleated RBCs 0.0 0  WBC    nRBC 0.00 0.00 - 0.01 K/uL    Neutrophils % 84 (H) 32 - 75 %    Lymphocytes % 10 (L) 12 - 49 %    Monocytes % 4 (L) 5 - 13 %    Eosinophils % 1 0 - 7 %    Basophils % 1 0 - 1 %    Immature Granulocytes 1 (H) 0.0 - 0.5 %    Neutrophils Absolute 11.7 (H) 1.8 - 8.0 K/UL    Lymphocytes Absolute 1.3 0.8 - 3.5 K/UL    Monocytes Absolute 0.5 0.0 - 1.0 K/UL    Eosinophils Absolute 0.2 0.0 - 0.4 K/UL    Basophils Absolute 0.1 0.0 - 0.1 K/UL    Absolute Immature Granulocyte 0.1 (H) 0.00 - 0.04 K/UL    Differential Type AUTOMATED     CMP    Collection Time:

## 2023-10-02 LAB
BASOPHILS # BLD: 0.1 K/UL (ref 0–0.1)
BASOPHILS NFR BLD: 0 % (ref 0–1)
COMMENT:: NORMAL
DIFFERENTIAL METHOD BLD: ABNORMAL
EOSINOPHIL # BLD: 0.1 K/UL (ref 0–0.4)
EOSINOPHIL NFR BLD: 1 % (ref 0–7)
ERYTHROCYTE [DISTWIDTH] IN BLOOD BY AUTOMATED COUNT: 17.1 % (ref 11.5–14.5)
GLUCOSE BLD STRIP.AUTO-MCNC: 138 MG/DL (ref 65–117)
GLUCOSE BLD STRIP.AUTO-MCNC: 169 MG/DL (ref 65–117)
GLUCOSE BLD STRIP.AUTO-MCNC: 196 MG/DL (ref 65–117)
GLUCOSE BLD STRIP.AUTO-MCNC: 205 MG/DL (ref 65–117)
HCT VFR BLD AUTO: 31.9 % (ref 35–47)
HGB BLD-MCNC: 9.8 G/DL (ref 11.5–16)
IMM GRANULOCYTES # BLD AUTO: 0.1 K/UL (ref 0–0.04)
IMM GRANULOCYTES NFR BLD AUTO: 1 % (ref 0–0.5)
LACTATE SERPL-SCNC: 2.7 MMOL/L (ref 0.4–2)
LACTATE SERPL-SCNC: 3.1 MMOL/L (ref 0.4–2)
LACTATE SERPL-SCNC: 3.4 MMOL/L (ref 0.4–2)
LACTATE SERPL-SCNC: 4.9 MMOL/L (ref 0.4–2)
LYMPHOCYTES # BLD: 1.1 K/UL (ref 0.8–3.5)
LYMPHOCYTES NFR BLD: 10 % (ref 12–49)
MAGNESIUM SERPL-MCNC: 3.9 MG/DL (ref 1.6–2.4)
MCH RBC QN AUTO: 28.7 PG (ref 26–34)
MCHC RBC AUTO-ENTMCNC: 30.7 G/DL (ref 30–36.5)
MCV RBC AUTO: 93.3 FL (ref 80–99)
MONOCYTES # BLD: 0.8 K/UL (ref 0–1)
MONOCYTES NFR BLD: 7 % (ref 5–13)
NEUTS SEG # BLD: 9.2 K/UL (ref 1.8–8)
NEUTS SEG NFR BLD: 81 % (ref 32–75)
NRBC # BLD: 0 K/UL (ref 0–0.01)
NRBC BLD-RTO: 0 PER 100 WBC
PHOSPHATE SERPL-MCNC: 3.3 MG/DL (ref 2.6–4.7)
PLATELET # BLD AUTO: 292 K/UL (ref 150–400)
PMV BLD AUTO: 9.9 FL (ref 8.9–12.9)
RBC # BLD AUTO: 3.42 M/UL (ref 3.8–5.2)
SERVICE CMNT-IMP: ABNORMAL
SPECIMEN HOLD: NORMAL
WBC # BLD AUTO: 11.3 K/UL (ref 3.6–11)

## 2023-10-02 PROCEDURE — 82962 GLUCOSE BLOOD TEST: CPT

## 2023-10-02 PROCEDURE — 6370000000 HC RX 637 (ALT 250 FOR IP): Performed by: FAMILY MEDICINE

## 2023-10-02 PROCEDURE — 36415 COLL VENOUS BLD VENIPUNCTURE: CPT

## 2023-10-02 PROCEDURE — P9047 ALBUMIN (HUMAN), 25%, 50ML: HCPCS | Performed by: HOSPITALIST

## 2023-10-02 PROCEDURE — 6360000002 HC RX W HCPCS: Performed by: HOSPITALIST

## 2023-10-02 PROCEDURE — 2060000000 HC ICU INTERMEDIATE R&B

## 2023-10-02 PROCEDURE — 83735 ASSAY OF MAGNESIUM: CPT

## 2023-10-02 PROCEDURE — 83605 ASSAY OF LACTIC ACID: CPT

## 2023-10-02 PROCEDURE — 6360000002 HC RX W HCPCS: Performed by: FAMILY MEDICINE

## 2023-10-02 PROCEDURE — 2580000003 HC RX 258: Performed by: FAMILY MEDICINE

## 2023-10-02 PROCEDURE — 90945 DIALYSIS ONE EVALUATION: CPT

## 2023-10-02 PROCEDURE — 85025 COMPLETE CBC W/AUTO DIFF WBC: CPT

## 2023-10-02 PROCEDURE — 84100 ASSAY OF PHOSPHORUS: CPT

## 2023-10-02 PROCEDURE — 6370000000 HC RX 637 (ALT 250 FOR IP): Performed by: NURSE PRACTITIONER

## 2023-10-02 PROCEDURE — 2580000003 HC RX 258: Performed by: HOSPITALIST

## 2023-10-02 PROCEDURE — 2580000003 HC RX 258: Performed by: NURSE PRACTITIONER

## 2023-10-02 RX ORDER — ALBUMIN (HUMAN) 12.5 G/50ML
25 SOLUTION INTRAVENOUS ONCE
Status: COMPLETED | OUTPATIENT
Start: 2023-10-02 | End: 2023-10-02

## 2023-10-02 RX ORDER — ONDANSETRON 2 MG/ML
4 INJECTION INTRAMUSCULAR; INTRAVENOUS EVERY 6 HOURS PRN
Status: DISCONTINUED | OUTPATIENT
Start: 2023-10-02 | End: 2023-10-06 | Stop reason: HOSPADM

## 2023-10-02 RX ADMIN — DULOXETINE HYDROCHLORIDE 20 MG: 20 CAPSULE, DELAYED RELEASE ORAL at 21:44

## 2023-10-02 RX ADMIN — DULOXETINE HYDROCHLORIDE 20 MG: 20 CAPSULE, DELAYED RELEASE ORAL at 09:07

## 2023-10-02 RX ADMIN — MAGNESIUM OXIDE TAB 400 MG (241.3 MG ELEMENTAL MG) 400 MG: 400 (241.3 MG) TAB at 21:44

## 2023-10-02 RX ADMIN — PIPERACILLIN AND TAZOBACTAM 3375 MG: 3; .375 INJECTION, POWDER, LYOPHILIZED, FOR SOLUTION INTRAVENOUS at 21:44

## 2023-10-02 RX ADMIN — SODIUM CHLORIDE, PRESERVATIVE FREE 10 ML: 5 INJECTION INTRAVENOUS at 09:09

## 2023-10-02 RX ADMIN — GENTAMICIN SULFATE: 1 CREAM TOPICAL at 22:39

## 2023-10-02 RX ADMIN — HYDROMORPHONE HYDROCHLORIDE 0.25 MG: 1 INJECTION, SOLUTION INTRAMUSCULAR; INTRAVENOUS; SUBCUTANEOUS at 11:20

## 2023-10-02 RX ADMIN — HEPARIN SODIUM 5000 UNITS: 5000 INJECTION INTRAVENOUS; SUBCUTANEOUS at 07:08

## 2023-10-02 RX ADMIN — ONDANSETRON 4 MG: 2 INJECTION INTRAMUSCULAR; INTRAVENOUS at 12:39

## 2023-10-02 RX ADMIN — HEPARIN SODIUM 5000 UNITS: 5000 INJECTION INTRAVENOUS; SUBCUTANEOUS at 21:43

## 2023-10-02 RX ADMIN — MONTELUKAST 10 MG: 10 TABLET, FILM COATED ORAL at 21:44

## 2023-10-02 RX ADMIN — INSULIN GLARGINE 20 UNITS: 100 INJECTION, SOLUTION SUBCUTANEOUS at 21:44

## 2023-10-02 RX ADMIN — PIPERACILLIN AND TAZOBACTAM 3375 MG: 3; .375 INJECTION, POWDER, LYOPHILIZED, FOR SOLUTION INTRAVENOUS at 09:45

## 2023-10-02 RX ADMIN — PANTOPRAZOLE SODIUM 40 MG: 40 TABLET, DELAYED RELEASE ORAL at 09:07

## 2023-10-02 RX ADMIN — ROSUVASTATIN 40 MG: 40 TABLET, FILM COATED ORAL at 09:08

## 2023-10-02 RX ADMIN — MAGNESIUM OXIDE TAB 400 MG (241.3 MG ELEMENTAL MG) 400 MG: 400 (241.3 MG) TAB at 09:07

## 2023-10-02 RX ADMIN — SODIUM CHLORIDE, SODIUM LACTATE, CALCIUM CHLORIDE, MAGNESIUM CHLORIDE AND DEXTROSE 6000 ML: 2.5; 538; 448; 18.3; 5.08 INJECTION, SOLUTION INTRAPERITONEAL at 22:40

## 2023-10-02 RX ADMIN — HEPARIN SODIUM 5000 UNITS: 5000 INJECTION INTRAVENOUS; SUBCUTANEOUS at 14:04

## 2023-10-02 RX ADMIN — Medication 1 MG: at 09:07

## 2023-10-02 RX ADMIN — SODIUM CHLORIDE, SODIUM LACTATE, CALCIUM CHLORIDE, MAGNESIUM CHLORIDE AND DEXTROSE 6000 ML: 2.5; 538; 448; 18.3; 5.08 INJECTION, SOLUTION INTRAPERITONEAL at 22:39

## 2023-10-02 RX ADMIN — SODIUM CHLORIDE, PRESERVATIVE FREE 10 ML: 5 INJECTION INTRAVENOUS at 21:45

## 2023-10-02 RX ADMIN — ASPIRIN 81 MG CHEWABLE TABLET 81 MG: 81 TABLET CHEWABLE at 09:07

## 2023-10-02 RX ADMIN — ALBUMIN (HUMAN) 25 G: 0.25 INJECTION, SOLUTION INTRAVENOUS at 08:31

## 2023-10-02 ASSESSMENT — PAIN DESCRIPTION - LOCATION
LOCATION: BACK
LOCATION: BACK;CHEST
LOCATION: BACK
LOCATION: BACK;CHEST;INCISION
LOCATION: SACRUM

## 2023-10-02 ASSESSMENT — PAIN SCALES - GENERAL
PAINLEVEL_OUTOF10: 5
PAINLEVEL_OUTOF10: 4
PAINLEVEL_OUTOF10: 8
PAINLEVEL_OUTOF10: 3
PAINLEVEL_OUTOF10: 8

## 2023-10-02 NOTE — PROGRESS NOTES
[facility]   [address], [address] [facility], [address] [facility]  Phone: [phone]   [phone]    [email or web address]     Nephrology Progress Note    Patient Name : Flaquita Muller      : 1955     MRN : [id number]  Date of Admission : 2023  Date of Servive : 10/02/23    CC: Follow up for ESRD       Assessment and Plan   ESRD on PD:  - cont home PD Rx  - daily labs     Hyponatremia:  - Na of 116 erroneous   - Na stable nicole 129 yesterday     Anemia of CKD  - LIS MWF started    CAD  -Status post CABG 2023     Hypertension  - BP stable     Diabetes  COPD     Interval History:  Seen and examined. Feeling better. Still tired. Na 129. UF 750cc overnight. Denies cp or sob    Review of Systems: Pertinent items are noted in HPI.     Current Medications:   Current Facility-Administered Medications   Medication Dose Route Frequency    gentamicin (GARAMYCIN) 0.1 % cream   Topical PRN    HYDROmorphone HCl PF (DILAUDID) injection 0.25 mg  0.25 mg IntraVENous Q4H PRN    piperacillin-tazobactam (ZOSYN) 3,375 mg in sodium chloride 0.9 % 50 mL IVPB (mini-bag)  3,375 mg IntraVENous Q12H    [Held by provider] carvedilol (COREG) tablet 3.125 mg  3.125 mg Oral BID WC    midodrine (PROAMATINE) tablet 5 mg  5 mg Oral TID WC    dianeal lo-brandi (ULTRABAG) 2.5% 6,000 mL  6,000 mL IntraPERitoneal Continuous    dianeal lo-brandi (ULTRABAG) 2.5% 6,000 mL  6,000 mL IntraPERitoneal Continuous    dianeal lo-brandi (ULTRABAG) 2.5% 6,000 mL  6,000 mL IntraPERitoneal Continuous    aspirin chewable tablet 81 mg  81 mg Oral Daily    DULoxetine (CYMBALTA) extended release capsule 20 mg  20 mg Oral BID    folic acid (FOLVITE) tablet 1 mg  1 mg Oral Daily    insulin glargine (LANTUS) injection vial 20 Units  20 Units SubCUTAneous Nightly    ipratropium 0.5 mg-albuterol 2.5 mg (DUONEB) nebulizer solution 1 Dose  1 Dose Inhalation Q6H PRN    magnesium oxide (MAG-OX) tablet 400 mg  400 mg Oral BID

## 2023-10-02 NOTE — CARE COORDINATION
Care Management Initial Assessment       RUR: 28% - high  Readmission? Yes - 09/30/23  1st IM letter given? Yes - 10/2/23  1st  letter given: No    Patient readmitted due to shortness of breath and fatigue at home. Patient recently discharged after cardiac surgery home with home health and her children. Patient is ESRD on PD. Medication adjustments were being made outpatient and continue inpatient for optimization. Anticipate patient will discharge home with home health once medically stable. 10/02/23 3671   Service Assessment   Patient Orientation Alert and Oriented   Cognition Alert   History Provided By Patient   Primary [facility] [place] Family Members;Friends/Neighbors;[employer] [facility]   Patient's Healthcare Decision Maker is: Legal Next of [facility]   PCP Verified by CM Yes   Last Visit to PCP Within last 6 months   Prior Functional Level Assistance with the following:;Bathing;Dressing; Toileting;Cooking;Housework; Shopping;Mobility   Current Functional Level Assistance with the following:;Bathing;Dressing; Toileting;Cooking;Housework; Shopping;Mobility   Can patient return to prior living arrangement Yes   Ability to make needs known: Fair   Family able to assist with home care needs: Yes   Would you like for me to discuss the discharge plan with any other family members/significant others, and if so, who?  Yes  (children)   Financial Resources  Resources None   Social/Functional History   Type of [address]   Ambulation Assistance Independent   Transfer Assistance Independent   Active  No   Patient's  Info children   Mode of Transportation Car   Occupation Retired   Discharge Planning   Type of [address] Family Members   Current Services Prior To Admission None   [address]

## 2023-10-02 NOTE — CARE COORDINATION
10/02/23 1453   Readmission Assessment   Number of Days since last admission? 8-30 days   Previous Disposition Home with Home Health   Who is being Interviewed Patient   What was the patient's/caregiver's perception as to why they think they needed to return back to the hospital? Did not realize care needs would be so extensive;Did not understand their medication regimen   Did you visit your Primary Care Physician after you left the hospital, before you returned this time? Yes   Did you see a specialist, such as Cardiac, Pulmonary, Orthopedic Physician, etc. after you left the hospital? Yes   Who advised the patient to return to the hospital? Self-referral   Does the patient report anything that got in the way of taking their medications? No   In our efforts to provide the best possible care to you and others like you, can you think of anything that we could have done to help you after you left the hospital the first time, so that you might not have needed to return so soon?  Identify patient's health literacy needs     Yolanda Rodriguez, MPH  Care Manager [facility]  Available via [facility] or

## 2023-10-02 NOTE — PROGRESS NOTES
0730  Received handoff from Silver. Safety checks preformed, pt awake in bed comfortably.  VSS    0830  Pt 's, hold midodrine per MD order, lactic acid 3.1, recheck in 6 hours    1400  Notified MD of lactic 2.7, recheck in 6 hours

## 2023-10-02 NOTE — PROGRESS NOTES
[facility]. Charting and pt care for patient Marko Aguilar performed by preceptee from 0730 to 1930. Documentation and care supervised and reviewed.

## 2023-10-02 NOTE — PROGRESS NOTES
[address] [facility]  Hospitalist Group                                                                                          Hospitalist Progress Note  Alfa Griffiths MD  Answering service: [phone] from in house phone        Date of Service:  10/2/2023  NAME:  Juan Barahona  :  1955  MRN:  [id number]       Admission Summary:     Juan Barahona is a 70 y.o. female with PMH of ASHD (atherosclerotic heart disease, NSTEMI, CAD s/p CABG x3 2023, ESRD on PD, hypertension, hyperlipidemia, GERD, type 2 diabetes mellitus, COPD, asthma, tobacco abuse, peripheral neuropathy, sepsis, bacteremia, pleural effusion presented to the emergency department chief complaints of nausea, vomiting, generalized weakness. Patient is a limited historian. She is accompanied by her daughter who provides additional history. Remainder of history is obtained per review of ED and electronic medical records. Of note, patient was recently hospitalized on 2023- 2023 with diagnosis of NSTEMI, elevated troponin, colitis, pleural effusion, hypokalemia, lactic acidosis, shortness of breath, sepsis with acute organ dysfunction, bacteremia (micrococcus species; gram-positive cocci in clusters), acute on subacute endocarditis. On 2023, patient underwent CABG x3 (LMA-LAD, SVG- OM1 and OM2). Since discharge, patient reportedly has had ongoing nausea, vomiting nonbloody nonbilious emesis, poor appetite, limited oral intake, generalized weakness. Her daughter notes in the last 4 days, patient is passed out (syncope/near syncope) at least 6 times with some episodes of falling onto the floor. She was not noted if patient hit her head. Patient's not complain of any slurred speech, facial droop, focal weakness, new onset numbness except for chronic neuropathy.  Patient also now complains of having severe generalized abdominal pain, rated 7 out of 10, constant, without specific exacerbating leaving

## 2023-10-02 NOTE — PROGRESS NOTES
Physician Progress Note      Poli Kay  CSN #:                  [id number]  :                       1955  ADMIT DATE:       2023 5:41 PM  [address] [facility] DATE:  RESPONDING  PROVIDER #:        Jayshree Harmon MD          QUERY TEXT:    Pt admitted with Acute hyponatremia and has CHF documented. If possible,   please document in progress notes and discharge summary further specificity   regarding the type and acuity of CHF:        The medical record reflects the following:  Risk Factors: CHF    Clinical Indicators:  H&P  PMH: CHF    ECHO   Left Ventricle: The EF by visual approximation is 40 - 45%. Left ventricle   size is normal. Global hypokinesis present with apical akinesis. Treatment:  carvedilol (COREG) tablet 6.25 mg      Thank you,  Rocio Boyd RN [facility]  Clinical Documentation  [phone] or via Perfect Serve  Options provided:  -- Chronic Systolic CHF/HFrEF  -- Acute on Chronic Systolic CHF/HFrEF  -- Acute Systolic CHF/HFrEF  -- Other - I will add my own diagnosis  -- Disagree - Not applicable / Not valid  -- Disagree - Clinically unable to determine / Unknown  -- Refer to Clinical Documentation Reviewer    PROVIDER RESPONSE TEXT:    This patient has chronic systolic CHF/HFrEF.     Query created by: Rocio Boyd on 10/2/2023 2:08 PM      Electronically signed by:  Jayshree Harmon MD 10/2/2023 2:28 PM

## 2023-10-03 LAB
ALBUMIN SERPL-MCNC: 2 G/DL (ref 3.5–5)
ALBUMIN/GLOB SERPL: 0.5 (ref 1.1–2.2)
ALP SERPL-CCNC: 431 U/L (ref 45–117)
ALT SERPL-CCNC: 9 U/L (ref 12–78)
ANION GAP SERPL CALC-SCNC: 14 MMOL/L (ref 5–15)
AST SERPL-CCNC: 25 U/L (ref 15–37)
BILIRUB SERPL-MCNC: 0.5 MG/DL (ref 0.2–1)
BUN SERPL-MCNC: 49 MG/DL (ref 6–20)
BUN/CREAT SERPL: 6 (ref 12–20)
CALCIUM SERPL-MCNC: 7.6 MG/DL (ref 8.5–10.1)
CHLORIDE SERPL-SCNC: 94 MMOL/L (ref 97–108)
CO2 SERPL-SCNC: 25 MMOL/L (ref 21–32)
CREAT SERPL-MCNC: 8.37 MG/DL (ref 0.55–1.02)
ERYTHROCYTE [DISTWIDTH] IN BLOOD BY AUTOMATED COUNT: 16.9 % (ref 11.5–14.5)
GLOBULIN SER CALC-MCNC: 4.2 G/DL (ref 2–4)
GLUCOSE BLD STRIP.AUTO-MCNC: 261 MG/DL (ref 65–117)
GLUCOSE BLD STRIP.AUTO-MCNC: 306 MG/DL (ref 65–117)
GLUCOSE BLD STRIP.AUTO-MCNC: 85 MG/DL (ref 65–117)
GLUCOSE SERPL-MCNC: 203 MG/DL (ref 65–100)
HCT VFR BLD AUTO: 28.8 % (ref 35–47)
HGB BLD-MCNC: 8.9 G/DL (ref 11.5–16)
LACTATE SERPL-SCNC: 2.8 MMOL/L (ref 0.4–2)
LACTATE SERPL-SCNC: 2.9 MMOL/L (ref 0.4–2)
LACTATE SERPL-SCNC: 3.1 MMOL/L (ref 0.4–2)
LACTATE SERPL-SCNC: 3.6 MMOL/L (ref 0.4–2)
MCH RBC QN AUTO: 28.4 PG (ref 26–34)
MCHC RBC AUTO-ENTMCNC: 30.9 G/DL (ref 30–36.5)
MCV RBC AUTO: 92 FL (ref 80–99)
NRBC # BLD: 0 K/UL (ref 0–0.01)
NRBC BLD-RTO: 0 PER 100 WBC
PLATELET # BLD AUTO: 299 K/UL (ref 150–400)
PMV BLD AUTO: 10.2 FL (ref 8.9–12.9)
POTASSIUM SERPL-SCNC: 2.5 MMOL/L (ref 3.5–5.1)
PROT SERPL-MCNC: 6.2 G/DL (ref 6.4–8.2)
RBC # BLD AUTO: 3.13 M/UL (ref 3.8–5.2)
SERVICE CMNT-IMP: ABNORMAL
SERVICE CMNT-IMP: ABNORMAL
SERVICE CMNT-IMP: NORMAL
SODIUM SERPL-SCNC: 133 MMOL/L (ref 136–145)
WBC # BLD AUTO: 10.3 K/UL (ref 3.6–11)

## 2023-10-03 PROCEDURE — 97161 PT EVAL LOW COMPLEX 20 MIN: CPT

## 2023-10-03 PROCEDURE — 6360000002 HC RX W HCPCS: Performed by: HOSPITALIST

## 2023-10-03 PROCEDURE — 85027 COMPLETE CBC AUTOMATED: CPT

## 2023-10-03 PROCEDURE — 90945 DIALYSIS ONE EVALUATION: CPT

## 2023-10-03 PROCEDURE — 97116 GAIT TRAINING THERAPY: CPT

## 2023-10-03 PROCEDURE — 83605 ASSAY OF LACTIC ACID: CPT

## 2023-10-03 PROCEDURE — 2060000000 HC ICU INTERMEDIATE R&B

## 2023-10-03 PROCEDURE — 2580000003 HC RX 258: Performed by: HOSPITALIST

## 2023-10-03 PROCEDURE — 6370000000 HC RX 637 (ALT 250 FOR IP): Performed by: FAMILY MEDICINE

## 2023-10-03 PROCEDURE — 2580000003 HC RX 258: Performed by: FAMILY MEDICINE

## 2023-10-03 PROCEDURE — 97530 THERAPEUTIC ACTIVITIES: CPT

## 2023-10-03 PROCEDURE — 82962 GLUCOSE BLOOD TEST: CPT

## 2023-10-03 PROCEDURE — 6360000002 HC RX W HCPCS: Performed by: FAMILY MEDICINE

## 2023-10-03 PROCEDURE — 36415 COLL VENOUS BLD VENIPUNCTURE: CPT

## 2023-10-03 PROCEDURE — 2580000003 HC RX 258: Performed by: INTERNAL MEDICINE

## 2023-10-03 PROCEDURE — 6360000002 HC RX W HCPCS: Performed by: NURSE PRACTITIONER

## 2023-10-03 PROCEDURE — 6370000000 HC RX 637 (ALT 250 FOR IP): Performed by: HOSPITALIST

## 2023-10-03 PROCEDURE — 80053 COMPREHEN METABOLIC PANEL: CPT

## 2023-10-03 RX ORDER — AMIODARONE HYDROCHLORIDE 200 MG/1
TABLET ORAL
Qty: 84 TABLET | Refills: 0 | OUTPATIENT
Start: 2023-10-03

## 2023-10-03 RX ORDER — SODIUM CHLORIDE, SODIUM LACTATE, CALCIUM CHLORIDE, MAGNESIUM CHLORIDE AND DEXTROSE 2.5; 538; 448; 18.3; 5.08 G/100ML; MG/100ML; MG/100ML; MG/100ML; MG/100ML
6000 INJECTION, SOLUTION INTRAPERITONEAL DAILY
Status: DISCONTINUED | OUTPATIENT
Start: 2023-10-03 | End: 2023-10-06 | Stop reason: HOSPADM

## 2023-10-03 RX ORDER — INSULIN LISPRO 100 [IU]/ML
14 INJECTION, SOLUTION INTRAVENOUS; SUBCUTANEOUS
Status: DISCONTINUED | OUTPATIENT
Start: 2023-10-03 | End: 2023-10-03

## 2023-10-03 RX ORDER — POTASSIUM CHLORIDE 7.45 MG/ML
10 INJECTION INTRAVENOUS
Status: COMPLETED | OUTPATIENT
Start: 2023-10-03 | End: 2023-10-03

## 2023-10-03 RX ORDER — INSULIN LISPRO 100 [IU]/ML
14 INJECTION, SOLUTION INTRAVENOUS; SUBCUTANEOUS ONCE
Status: COMPLETED | OUTPATIENT
Start: 2023-10-03 | End: 2023-10-03

## 2023-10-03 RX ADMIN — POTASSIUM CHLORIDE 10 MEQ: 7.46 INJECTION, SOLUTION INTRAVENOUS at 10:27

## 2023-10-03 RX ADMIN — HEPARIN SODIUM 5000 UNITS: 5000 INJECTION INTRAVENOUS; SUBCUTANEOUS at 14:55

## 2023-10-03 RX ADMIN — HYDROMORPHONE HYDROCHLORIDE 0.25 MG: 1 INJECTION, SOLUTION INTRAMUSCULAR; INTRAVENOUS; SUBCUTANEOUS at 00:35

## 2023-10-03 RX ADMIN — PANTOPRAZOLE SODIUM 40 MG: 40 TABLET, DELAYED RELEASE ORAL at 09:08

## 2023-10-03 RX ADMIN — Medication 6 MG: at 00:35

## 2023-10-03 RX ADMIN — HEPARIN SODIUM 5000 UNITS: 5000 INJECTION INTRAVENOUS; SUBCUTANEOUS at 06:47

## 2023-10-03 RX ADMIN — PIPERACILLIN AND TAZOBACTAM 3375 MG: 3; .375 INJECTION, POWDER, LYOPHILIZED, FOR SOLUTION INTRAVENOUS at 21:17

## 2023-10-03 RX ADMIN — DULOXETINE HYDROCHLORIDE 20 MG: 20 CAPSULE, DELAYED RELEASE ORAL at 21:17

## 2023-10-03 RX ADMIN — GENTAMICIN SULFATE: 1 CREAM TOPICAL at 20:52

## 2023-10-03 RX ADMIN — SODIUM CHLORIDE, SODIUM LACTATE, CALCIUM CHLORIDE, MAGNESIUM CHLORIDE AND DEXTROSE 6000 ML: 2.5; 538; 448; 18.3; 5.08 INJECTION, SOLUTION INTRAPERITONEAL at 20:52

## 2023-10-03 RX ADMIN — Medication 14 UNITS: at 18:04

## 2023-10-03 RX ADMIN — Medication 1 MG: at 09:08

## 2023-10-03 RX ADMIN — MONTELUKAST 10 MG: 10 TABLET, FILM COATED ORAL at 21:17

## 2023-10-03 RX ADMIN — HEPARIN SODIUM 5000 UNITS: 5000 INJECTION INTRAVENOUS; SUBCUTANEOUS at 21:19

## 2023-10-03 RX ADMIN — POTASSIUM CHLORIDE 10 MEQ: 7.46 INJECTION, SOLUTION INTRAVENOUS at 06:47

## 2023-10-03 RX ADMIN — POTASSIUM CHLORIDE 10 MEQ: 7.46 INJECTION, SOLUTION INTRAVENOUS at 05:17

## 2023-10-03 RX ADMIN — MAGNESIUM OXIDE TAB 400 MG (241.3 MG ELEMENTAL MG) 400 MG: 400 (241.3 MG) TAB at 09:08

## 2023-10-03 RX ADMIN — POTASSIUM CHLORIDE 10 MEQ: 7.46 INJECTION, SOLUTION INTRAVENOUS at 11:40

## 2023-10-03 RX ADMIN — MAGNESIUM OXIDE TAB 400 MG (241.3 MG ELEMENTAL MG) 400 MG: 400 (241.3 MG) TAB at 21:17

## 2023-10-03 RX ADMIN — ASPIRIN 81 MG CHEWABLE TABLET 81 MG: 81 TABLET CHEWABLE at 09:08

## 2023-10-03 RX ADMIN — SODIUM CHLORIDE, PRESERVATIVE FREE 10 ML: 5 INJECTION INTRAVENOUS at 21:20

## 2023-10-03 RX ADMIN — ONDANSETRON 4 MG: 2 INJECTION INTRAMUSCULAR; INTRAVENOUS at 02:38

## 2023-10-03 RX ADMIN — DULOXETINE HYDROCHLORIDE 20 MG: 20 CAPSULE, DELAYED RELEASE ORAL at 09:08

## 2023-10-03 RX ADMIN — HYDROMORPHONE HYDROCHLORIDE 0.25 MG: 1 INJECTION, SOLUTION INTRAMUSCULAR; INTRAVENOUS; SUBCUTANEOUS at 18:15

## 2023-10-03 RX ADMIN — PIPERACILLIN AND TAZOBACTAM 3375 MG: 3; .375 INJECTION, POWDER, LYOPHILIZED, FOR SOLUTION INTRAVENOUS at 09:07

## 2023-10-03 RX ADMIN — POTASSIUM CHLORIDE 10 MEQ: 7.46 INJECTION, SOLUTION INTRAVENOUS at 09:07

## 2023-10-03 RX ADMIN — ROSUVASTATIN 40 MG: 40 TABLET, FILM COATED ORAL at 09:08

## 2023-10-03 ASSESSMENT — PAIN DESCRIPTION - LOCATION
LOCATION: INCISION;BACK
LOCATION: BUTTOCKS

## 2023-10-03 ASSESSMENT — PAIN DESCRIPTION - ORIENTATION: ORIENTATION: MID

## 2023-10-03 ASSESSMENT — PAIN SCALES - GENERAL
PAINLEVEL_OUTOF10: 5
PAINLEVEL_OUTOF10: 0
PAINLEVEL_OUTOF10: 8
PAINLEVEL_OUTOF10: 0
PAINLEVEL_OUTOF10: 8

## 2023-10-03 ASSESSMENT — PAIN - FUNCTIONAL ASSESSMENT: PAIN_FUNCTIONAL_ASSESSMENT: ACTIVITIES ARE NOT PREVENTED

## 2023-10-03 ASSESSMENT — PAIN DESCRIPTION - DESCRIPTORS: DESCRIPTORS: ACHING

## 2023-10-03 ASSESSMENT — PAIN DESCRIPTION - PAIN TYPE: TYPE: ACUTE PAIN

## 2023-10-03 ASSESSMENT — PAIN DESCRIPTION - FREQUENCY: FREQUENCY: INTERMITTENT

## 2023-10-03 ASSESSMENT — PAIN DESCRIPTION - ONSET: ONSET: ON-GOING

## 2023-10-03 NOTE — PLAN OF CARE
Problem: Physical Therapy - Adult  Goal: By Discharge: Performs mobility at highest level of function for planned discharge setting. See evaluation for individualized goals. Description: FUNCTIONAL STATUS PRIOR TO ADMISSION: Patient with hx CABG x 3 on 9/7/2023. Prior to CABG, patient was mod I with rollator. Since returning home from CABG sx, patient reports needing intermittent assistance from her son or daughter for all functional mobility and for bathing. Patient has been receiving [facility] PT since surgery. Patient reports 3 falls in the last few weeks related to passing out. HOME SUPPORT PRIOR TO ADMISSION: The patient lived with son, daughter and required minimal assistance for bathing, occasionally for basic household mobility. Physical Therapy Goals  Initiated 10/3/2023  1. Patient will move from supine to sit and sit to supine in bed with contact guard assist within 5 day(s). 2.  Patient will perform sit to stand with contact guard assist within 5 day(s). 3.  Patient will transfer from bed to chair and chair to bed with contact guard assist using the least restrictive device within 5 day(s). 4.  Patient will ambulate with contact guard assist for 50 feet with the least restrictive device within 5 day(s). 5.  Patient will ascend/descend 3 stairs with bilateral handrail(s) with minimal assistance within 5 day(s). 6.  Patient will perform cardiac exercises per protocol with supervision/set-up within 5 days.   7.  Patient will verbally recall and functionally demonstrate mindful-based movements (\"move in the tube\") principles without cues within 5 days.'  Outcome: Progressing   PHYSICAL THERAPY EVALUATION    Patient: Sunshine Martinez (89 y.o. female)  Date: 10/3/2023  Primary Diagnosis: Acute hyponatremia [E87.1]  Hyponatremia [E87.1]  General weakness [R53.1]  ESRD on peritoneal dialysis ([address]) [N18.6, Z99.2]  Nausea and vomiting, unspecified vomiting type [R11.2]       Precautions: Fall Risk (Move

## 2023-10-03 NOTE — PROGRESS NOTES
0730: Bedside and Verbal shift change report given to [address] (oncoming nurse) by Rosa Gavin (offgoing nurse). Report included the following information Nurse Handoff Report, Index, Intake/Output, MAR, Recent Results, Med Rec Status, and Cardiac Rhythm NSR .

## 2023-10-03 NOTE — PROGRESS NOTES
[facility]   [address], [address] [facility], [address] [facility]  Phone: [phone]   [phone]    [email or web address]     Nephrology Progress Note    Patient Name : Radhika Choudhary      : 1955     MRN : [id number]  Date of Admission : 2023  Date of Servive : 10/03/23    CC: Follow up for ESRD       Assessment and Plan   ESRD on PD:  - cont home PD Rx  - daily labs     Hyponatremia:  - Na of 116 erroneous   -  Na stable    Hypokalemia:  - getting IV KcL     Anemia of CKD  - LIS MWF started    CAD  -Status post CABG 2023     Hypertension  - BP stable     Diabetes  COPD     Interval History:  Seen and examined. Feeling better. Still tired. Na 133. Minimal UF overnight with PD.   K low. Resting this AM.    Review of Systems: Pertinent items are noted in HPI.     Current Medications:   Current Facility-Administered Medications   Medication Dose Route Frequency    potassium chloride 10 mEq/100 mL IVPB (Peripheral Line)  10 mEq IntraVENous Q1H    ondansetron (ZOFRAN) injection 4 mg  4 mg IntraVENous Q6H PRN    gentamicin (GARAMYCIN) 0.1 % cream   Topical PRN    HYDROmorphone HCl PF (DILAUDID) injection 0.25 mg  0.25 mg IntraVENous Q4H PRN    piperacillin-tazobactam (ZOSYN) 3,375 mg in sodium chloride 0.9 % 50 mL IVPB (mini-bag)  3,375 mg IntraVENous Q12H    [Held by provider] carvedilol (COREG) tablet 3.125 mg  3.125 mg Oral BID WC    dianeal lo-brandi (ULTRABAG) 2.5% 6,000 mL  6,000 mL IntraPERitoneal Continuous    dianeal lo-brandi (ULTRABAG) 2.5% 6,000 mL  6,000 mL IntraPERitoneal Continuous    dianeal lo-brandi (ULTRABAG) 2.5% 6,000 mL  6,000 mL IntraPERitoneal Continuous    aspirin chewable tablet 81 mg  81 mg Oral Daily    DULoxetine (CYMBALTA) extended release capsule 20 mg  20 mg Oral BID    folic acid (FOLVITE) tablet 1 mg  1 mg Oral Daily    insulin glargine (LANTUS) injection vial 20 Units  20 Units SubCUTAneous Nightly    ipratropium 0.5 mg-albuterol 2.5 mg

## 2023-10-03 NOTE — PROGRESS NOTES
[address] [facility]  Hospitalist Group                                                                                          Hospitalist Progress Note  Krishan Correa MD  Answering service: [phone] from in house phone        Date of Service:  10/3/2023  NAME:  Ciara Antunez  :  1955  MRN:  [id number]       Admission Summary:     Ciara Antunez is a 70 y.o. female with PMH of ASHD (atherosclerotic heart disease, NSTEMI, CAD s/p CABG x3 2023, ESRD on PD, hypertension, hyperlipidemia, GERD, type 2 diabetes mellitus, COPD, asthma, tobacco abuse, peripheral neuropathy, sepsis, bacteremia, pleural effusion presented to the emergency department chief complaints of nausea, vomiting, generalized weakness. Patient is a limited historian. She is accompanied by her daughter who provides additional history. Remainder of history is obtained per review of ED and electronic medical records. Of note, patient was recently hospitalized on 2023- 2023 with diagnosis of NSTEMI, elevated troponin, colitis, pleural effusion, hypokalemia, lactic acidosis, shortness of breath, sepsis with acute organ dysfunction, bacteremia (micrococcus species; gram-positive cocci in clusters), acute on subacute endocarditis. On 2023, patient underwent CABG x3 (LMA-LAD, SVG- OM1 and OM2). Since discharge, patient reportedly has had ongoing nausea, vomiting nonbloody nonbilious emesis, poor appetite, limited oral intake, generalized weakness. Her daughter notes in the last 4 days, patient is passed out (syncope/near syncope) at least 6 times with some episodes of falling onto the floor. She was not noted if patient hit her head. Patient's not complain of any slurred speech, facial droop, focal weakness, new onset numbness except for chronic neuropathy.  Patient also now complains of having severe generalized abdominal pain, rated 7 out of 10, constant, without specific exacerbating leaving 10/02/23  0050 10/03/23  0231   WBC 11.3* 10.3   HGB 9.8* 8.9*   HCT 31.9* 28.8*    299       Recent Labs     09/30/23  1800 10/01/23  0139 10/02/23  1257 10/03/23  0231   *  116* 129*  --  133*   K 3.7  HEMOLYZED,RECOLLECT REQUESTED 3.8  --  2.5*   CL 87* 89*  --  94*   CO2 22 26  --  25   BUN 58* 58*  --  49*   MG HEMOLYZED,RECOLLECT REQUESTED  HEMOLYZED,RECOLLECT REQUESTED 4.6* 3.9*  --    PHOS  --  6.2* 3.3  --        Recent Labs     09/30/23  1800 10/01/23  0139 10/03/23  0231   ALT HEMOLYZED,RECOLLECT REQUESTED 11* 9*   GLOB 6.7* 5.4* 4.2*       Recent Labs     10/01/23  0139   INR 1.2*   APTT 30.4        No results for input(s): \"TIBC\", \"FERR\" in the last 72 hours. Invalid input(s): \"FE\", \"PSAT\"   No results found for: \"FOL\", \"RBCF\"   No results for input(s): \"PH\", \"PCO2\", \"PO2\" in the last 72 hours. No results for input(s): \"CPK\" in the last 72 hours. Invalid input(s): \"CPKMB\", \"CKNDX\", \"TROIQ\"  No results found for: \"CHOL\", \"CHOLX\", \"CHLST\", \"CHOLV\", \"HDL\", \"HDLC\", \"LDL\", \"LDLC\", \"TGLX\", \"TRIGL\"  Lab Results   Component Value Date/Time    GLUCPOC 159 01/09/2023 03:20 PM    [address] 166 01/09/2023 03:05 PM     [unfilled]    Notes reviewed from all clinical/nonclinical/nursing services involved in patient's clinical care. Care coordination discussions were held with appropriate clinical/nonclinical/ nursing providers based on care coordination needs. Patients current active Medications were reviewed, considered, added and adjusted based on the clinical condition today. Home Medications were reconciled to the best of my ability given all available resources at the time of admission. Route is PO if not otherwise noted.       Admission Status:[id number]:::1}      Medications Reviewed:     Current Facility-Administered Medications   Medication Dose Route Frequency    dianeal lo-brandi (ULTRABAG) 2.5% 6,000 mL  6,000 mL IntraPERitoneal Daily    dianeal lo-brandi (ULTRABAG) 2.5% 6,000 mL

## 2023-10-03 NOTE — FLOWSHEET NOTE
CCPD DISCONNECTION   10/03/23 0830   Observations & Evaluations   Level of Consciousness 0   Heart Rhythm   (BEDSIDE TELEMETRY)   Respiratory Quality/Effort Unlabored   Skin Condition/Temp Dry; Warm   Abdomen Inspection Soft   RLE Edema Trace   LLE Edema Trace   Vital Signs   BP (!) 125/56   Pulse 82   Respirations 15   SpO2 99 %   Technical Checks   ICEBOAT I;C;E;B;O;A;T        10/03/23 0830   Vitals   BP (!) 125/56   Pulse 82   Respirations 15   SpO2 99 %   Peritoneal Dialysis Catheter Mid lower abdomen   No placement date or time found. Catheter Location: Mid lower abdomen   Status Deaccessed   Dressing Status Clean, dry & intact   Date of Last Dressing Change 10/02/23   Post-Treatment (Cycler)   Average Dwell Time (Hours:Minutes) 1:22   Lost Dwell Time (Hours:Minutes) 0:13   Effluent Appearance Clear;Yellow   PD Output (mL) 440 mL  ( + = NET )     Hepatitis B Surface Ag   Date/Time Value Ref Range Status   10/01/2023 01:39 AM <0.10 Index Final     Hep B S Ab   Date/Time Value Ref Range Status   08/20/2023 10:01 AM <3.10 mIU/mL Final       Comments: treatment completed no recorded alarms, one minute alcavis scrub followed by one minute soak and sterile minicap.

## 2023-10-03 NOTE — ACP (ADVANCE CARE PLANNING)
Advance Care Planning     Advance Care Planning (ACP) Physician/NP/PA Conversation    Date of Conversation: 9/30/2023  Conducted with: Patient with Decision Making Capacity    Healthcare Decision Maker:    Primary Decision Maker: Declan Cobb - daughter - [phone]    Primary Decision Maker: Mahendra Ortez Son - [phone]    Care Preferences:    Hospitalization: \"If your health worsens and it becomes clear that your chance of recovery is unlikely, what would be your preference regarding hospitalization? \"  The patient would prefer hospitalization. Ventilation: \"If you were unable to breath on your own and your chance of recovery was unlikely, what would be your preference about the use of a ventilator (breathing machine) if it was available to you? \"  The patient would desire the use of a ventilator. Resuscitation: \"In the event your heart stopped as a result of an underlying serious health condition, would you want attempts made to restart your heart, or would you prefer a natural death? \"  Yes, attempt to resuscitate.     treatment goals, benefit/burden of treatment options, artificial nutrition, ventilation preferences, hospitalization preferences, and resuscitation preferences    Conversation Outcomes / Follow-Up Plan:  ACP complete - no further action today  Reviewed DNR/DNI and patient elects Full Code (Attempt Resuscitation)    Length of Voluntary ACP Conversation in minutes:  16 minutes    Alessandro Mann MD  10/3/2023

## 2023-10-04 LAB
ALBUMIN SERPL-MCNC: 1.9 G/DL (ref 3.5–5)
ALBUMIN/GLOB SERPL: 0.4 (ref 1.1–2.2)
ALP SERPL-CCNC: 544 U/L (ref 45–117)
ALT SERPL-CCNC: 15 U/L (ref 12–78)
ANION GAP SERPL CALC-SCNC: 9 MMOL/L (ref 5–15)
AST SERPL-CCNC: 48 U/L (ref 15–37)
BILIRUB SERPL-MCNC: 0.4 MG/DL (ref 0.2–1)
BUN SERPL-MCNC: 46 MG/DL (ref 6–20)
BUN/CREAT SERPL: 6 (ref 12–20)
CALCIUM SERPL-MCNC: 7.4 MG/DL (ref 8.5–10.1)
CHLORIDE SERPL-SCNC: 96 MMOL/L (ref 97–108)
CO2 SERPL-SCNC: 25 MMOL/L (ref 21–32)
CREAT SERPL-MCNC: 8.02 MG/DL (ref 0.55–1.02)
ERYTHROCYTE [DISTWIDTH] IN BLOOD BY AUTOMATED COUNT: 17.1 % (ref 11.5–14.5)
GLOBULIN SER CALC-MCNC: 4.9 G/DL (ref 2–4)
GLUCOSE BLD STRIP.AUTO-MCNC: 275 MG/DL (ref 65–117)
GLUCOSE BLD STRIP.AUTO-MCNC: 295 MG/DL (ref 65–117)
GLUCOSE BLD STRIP.AUTO-MCNC: 347 MG/DL (ref 65–117)
GLUCOSE SERPL-MCNC: 273 MG/DL (ref 65–100)
HCT VFR BLD AUTO: 29.4 % (ref 35–47)
HGB BLD-MCNC: 9.1 G/DL (ref 11.5–16)
LACTATE SERPL-SCNC: 2.8 MMOL/L (ref 0.4–2)
LACTATE SERPL-SCNC: 2.9 MMOL/L (ref 0.4–2)
MCH RBC QN AUTO: 29.1 PG (ref 26–34)
MCHC RBC AUTO-ENTMCNC: 31 G/DL (ref 30–36.5)
MCV RBC AUTO: 93.9 FL (ref 80–99)
NRBC # BLD: 0 K/UL (ref 0–0.01)
NRBC BLD-RTO: 0 PER 100 WBC
PLATELET # BLD AUTO: 312 K/UL (ref 150–400)
PMV BLD AUTO: 10.6 FL (ref 8.9–12.9)
POTASSIUM SERPL-SCNC: 2.8 MMOL/L (ref 3.5–5.1)
PROT SERPL-MCNC: 6.8 G/DL (ref 6.4–8.2)
RBC # BLD AUTO: 3.13 M/UL (ref 3.8–5.2)
SERVICE CMNT-IMP: ABNORMAL
SODIUM SERPL-SCNC: 130 MMOL/L (ref 136–145)
WBC # BLD AUTO: 10.1 K/UL (ref 3.6–11)

## 2023-10-04 PROCEDURE — 94760 N-INVAS EAR/PLS OXIMETRY 1: CPT

## 2023-10-04 PROCEDURE — 80053 COMPREHEN METABOLIC PANEL: CPT

## 2023-10-04 PROCEDURE — 97165 OT EVAL LOW COMPLEX 30 MIN: CPT

## 2023-10-04 PROCEDURE — 83605 ASSAY OF LACTIC ACID: CPT

## 2023-10-04 PROCEDURE — 90945 DIALYSIS ONE EVALUATION: CPT

## 2023-10-04 PROCEDURE — 2580000003 HC RX 258: Performed by: FAMILY MEDICINE

## 2023-10-04 PROCEDURE — 6370000000 HC RX 637 (ALT 250 FOR IP): Performed by: INTERNAL MEDICINE

## 2023-10-04 PROCEDURE — 36415 COLL VENOUS BLD VENIPUNCTURE: CPT

## 2023-10-04 PROCEDURE — 2580000003 HC RX 258: Performed by: HOSPITALIST

## 2023-10-04 PROCEDURE — 6360000002 HC RX W HCPCS: Performed by: FAMILY MEDICINE

## 2023-10-04 PROCEDURE — 97535 SELF CARE MNGMENT TRAINING: CPT

## 2023-10-04 PROCEDURE — 2580000003 HC RX 258: Performed by: INTERNAL MEDICINE

## 2023-10-04 PROCEDURE — 2060000000 HC ICU INTERMEDIATE R&B

## 2023-10-04 PROCEDURE — 85027 COMPLETE CBC AUTOMATED: CPT

## 2023-10-04 PROCEDURE — 6370000000 HC RX 637 (ALT 250 FOR IP): Performed by: FAMILY MEDICINE

## 2023-10-04 PROCEDURE — 6360000002 HC RX W HCPCS: Performed by: HOSPITALIST

## 2023-10-04 PROCEDURE — 6370000000 HC RX 637 (ALT 250 FOR IP): Performed by: HOSPITALIST

## 2023-10-04 PROCEDURE — 82962 GLUCOSE BLOOD TEST: CPT

## 2023-10-04 RX ORDER — FLUCONAZOLE 100 MG/1
200 TABLET ORAL ONCE
Status: COMPLETED | OUTPATIENT
Start: 2023-10-04 | End: 2023-10-04

## 2023-10-04 RX ORDER — HYDROCORTISONE 25 MG/G
CREAM TOPICAL 2 TIMES DAILY
Status: DISCONTINUED | OUTPATIENT
Start: 2023-10-04 | End: 2023-10-06 | Stop reason: HOSPADM

## 2023-10-04 RX ORDER — ALBUTEROL SULFATE 2.5 MG/3ML
2.5 SOLUTION RESPIRATORY (INHALATION) EVERY 6 HOURS PRN
Status: DISCONTINUED | OUTPATIENT
Start: 2023-10-04 | End: 2023-10-06 | Stop reason: HOSPADM

## 2023-10-04 RX ORDER — ROSUVASTATIN CALCIUM 10 MG/1
10 TABLET, COATED ORAL DAILY
Status: DISCONTINUED | OUTPATIENT
Start: 2023-10-05 | End: 2023-10-06 | Stop reason: HOSPADM

## 2023-10-04 RX ORDER — FLUCONAZOLE 100 MG/1
150 TABLET ORAL DAILY
Status: DISCONTINUED | OUTPATIENT
Start: 2023-10-04 | End: 2023-10-04

## 2023-10-04 RX ORDER — FLUCONAZOLE 100 MG/1
100 TABLET ORAL DAILY
Status: DISCONTINUED | OUTPATIENT
Start: 2023-10-05 | End: 2023-10-06 | Stop reason: HOSPADM

## 2023-10-04 RX ADMIN — MONTELUKAST 10 MG: 10 TABLET, FILM COATED ORAL at 20:04

## 2023-10-04 RX ADMIN — SODIUM CHLORIDE, PRESERVATIVE FREE 10 ML: 5 INJECTION INTRAVENOUS at 09:01

## 2023-10-04 RX ADMIN — HEPARIN SODIUM 5000 UNITS: 5000 INJECTION INTRAVENOUS; SUBCUTANEOUS at 06:19

## 2023-10-04 RX ADMIN — POTASSIUM BICARBONATE 40 MEQ: 782 TABLET, EFFERVESCENT ORAL at 10:45

## 2023-10-04 RX ADMIN — SODIUM CHLORIDE, SODIUM LACTATE, CALCIUM CHLORIDE, MAGNESIUM CHLORIDE AND DEXTROSE 6000 ML: 2.5; 538; 448; 18.3; 5.08 INJECTION, SOLUTION INTRAPERITONEAL at 15:48

## 2023-10-04 RX ADMIN — INSULIN GLARGINE 20 UNITS: 100 INJECTION, SOLUTION SUBCUTANEOUS at 22:13

## 2023-10-04 RX ADMIN — ONDANSETRON 4 MG: 2 INJECTION INTRAMUSCULAR; INTRAVENOUS at 08:54

## 2023-10-04 RX ADMIN — PIPERACILLIN AND TAZOBACTAM 3375 MG: 3; .375 INJECTION, POWDER, LYOPHILIZED, FOR SOLUTION INTRAVENOUS at 08:54

## 2023-10-04 RX ADMIN — Medication 1 MG: at 08:45

## 2023-10-04 RX ADMIN — ROSUVASTATIN 40 MG: 40 TABLET, FILM COATED ORAL at 08:45

## 2023-10-04 RX ADMIN — DULOXETINE HYDROCHLORIDE 20 MG: 20 CAPSULE, DELAYED RELEASE ORAL at 08:45

## 2023-10-04 RX ADMIN — HYDROCORTISONE: 25 CREAM TOPICAL at 20:04

## 2023-10-04 RX ADMIN — POTASSIUM BICARBONATE 40 MEQ: 782 TABLET, EFFERVESCENT ORAL at 14:54

## 2023-10-04 RX ADMIN — FLUCONAZOLE 200 MG: 100 TABLET ORAL at 11:45

## 2023-10-04 RX ADMIN — MAGNESIUM OXIDE TAB 400 MG (241.3 MG ELEMENTAL MG) 400 MG: 400 (241.3 MG) TAB at 20:04

## 2023-10-04 RX ADMIN — DULOXETINE HYDROCHLORIDE 20 MG: 20 CAPSULE, DELAYED RELEASE ORAL at 20:04

## 2023-10-04 RX ADMIN — SODIUM CHLORIDE, SODIUM LACTATE, CALCIUM CHLORIDE, MAGNESIUM CHLORIDE AND DEXTROSE 6000 ML: 2.5; 538; 448; 18.3; 5.08 INJECTION, SOLUTION INTRAPERITONEAL at 15:47

## 2023-10-04 RX ADMIN — HEPARIN SODIUM 5000 UNITS: 5000 INJECTION INTRAVENOUS; SUBCUTANEOUS at 14:15

## 2023-10-04 RX ADMIN — PANTOPRAZOLE SODIUM 40 MG: 40 TABLET, DELAYED RELEASE ORAL at 08:45

## 2023-10-04 RX ADMIN — HYDROMORPHONE HYDROCHLORIDE 0.25 MG: 1 INJECTION, SOLUTION INTRAMUSCULAR; INTRAVENOUS; SUBCUTANEOUS at 01:27

## 2023-10-04 RX ADMIN — HEPARIN SODIUM 5000 UNITS: 5000 INJECTION INTRAVENOUS; SUBCUTANEOUS at 22:13

## 2023-10-04 RX ADMIN — MAGNESIUM OXIDE TAB 400 MG (241.3 MG ELEMENTAL MG) 400 MG: 400 (241.3 MG) TAB at 08:45

## 2023-10-04 RX ADMIN — ASPIRIN 81 MG CHEWABLE TABLET 81 MG: 81 TABLET CHEWABLE at 08:45

## 2023-10-04 RX ADMIN — SODIUM CHLORIDE, PRESERVATIVE FREE 10 ML: 5 INJECTION INTRAVENOUS at 20:10

## 2023-10-04 RX ADMIN — GENTAMICIN SULFATE: 1 CREAM TOPICAL at 15:48

## 2023-10-04 ASSESSMENT — PAIN SCALES - GENERAL
PAINLEVEL_OUTOF10: 0
PAINLEVEL_OUTOF10: 7
PAINLEVEL_OUTOF10: 8
PAINLEVEL_OUTOF10: 0
PAINLEVEL_OUTOF10: 0
PAINLEVEL_OUTOF10: 7
PAINLEVEL_OUTOF10: 0

## 2023-10-04 ASSESSMENT — PAIN DESCRIPTION - LOCATION
LOCATION: BACK
LOCATION: CHEST;BACK
LOCATION: BACK

## 2023-10-04 ASSESSMENT — PAIN DESCRIPTION - ORIENTATION: ORIENTATION: LEFT

## 2023-10-04 ASSESSMENT — PAIN DESCRIPTION - DESCRIPTORS: DESCRIPTORS: OTHER (COMMENT)

## 2023-10-04 NOTE — PROGRESS NOTES
Physical Therapy    Chart reviewed. Patient undergoing peritoneal dialysis at time of attempt. Will defer.     Alto Hamman, PT, DPT

## 2023-10-04 NOTE — PROGRESS NOTES
1930: Bedside shift change report given to JANES ALEJANDRE (oncoming nurse) by Doris Okeefe RN (offgoing nurse). Report included the following information Nurse Handoff Report, Adult Overview, and Recent Results. 2100: Patients blood sugar 85. Diamond Nix NP notified. Orders to hold night time Lantus. 0730: Bedside shift change report given to Selam Donahue (oncoming nurse) by JANES ALEJANDRE (offgoing nurse). Report included the following information Nurse Handoff Report, Adult Overview, and Recent Results.

## 2023-10-04 NOTE — PROGRESS NOTES
Renal Dosing/Monitoring  Medication: rosuvastatin   Current regimen:  40 mg every 24 hr  Recent Labs     10/03/23  0231 10/04/23  0326   CREATININE 8.37* 8.02*   BUN 49* 46*     Estimated CrCl:  PD  Plan: Change to 10 mg PO daily (max recommended dose for PD patients)

## 2023-10-04 NOTE — PROGRESS NOTES
[facility]   [address], [address] [facility], [facility]  Phone: [phone]   [phone]    [email or web address]     Nephrology Progress Note    Patient Name : Aysha Pat      : 1955     MRN : [id number]  Date of Admission : 2023  Date of Servive : 10/04/23    CC: Follow up for ESRD       Assessment and Plan   ESRD on PD:  - cont home PD Rx  - effluent clear, no clinical evidence of peritonitis  - add fluconazole prophylaxis while on broad spectrum abx  - daily labs     Hyponatremia:  - Na of 116 erroneous   -  Na stable    Hypokalemia:  - oral Kcl ordered    Serratia UTI:  - on zosyn     Anemia of CKD  - LIS MWF started    CAD  -Status post CABG 2023  - sternal wound drainage - minimal  - on abx     Hypertension  - BP stable     Diabetes  COPD     Interval History:  Seen and examined. Feeling better. Good UF with PD yesterday. K low, Na stable. No cp, abd pain. Effluent clear. Review of Systems: Pertinent items are noted in HPI.     Current Medications:   Current Facility-Administered Medications   Medication Dose Route Frequency    albuterol (PROVENTIL) (2.5 MG/3ML) 0.083% nebulizer solution 2.5 mg  2.5 mg Nebulization Q6H PRN    potassium bicarb-citric acid (EFFER-K) effervescent tablet 40 mEq  40 mEq Oral Q4H    dianeal lo-brandi (ULTRABAG) 2.5% 6,000 mL  6,000 mL IntraPERitoneal Daily    dianeal lo-brandi (ULTRABAG) 2.5% 6,000 mL  6,000 mL IntraPERitoneal Daily    dianeal lo-brandi (ULTRABAG) 2.5% 6,000 mL  6,000 mL IntraPERitoneal Daily    ondansetron (ZOFRAN) injection 4 mg  4 mg IntraVENous Q6H PRN    gentamicin (GARAMYCIN) 0.1 % cream   Topical PRN    HYDROmorphone HCl PF (DILAUDID) injection 0.25 mg  0.25 mg IntraVENous Q4H PRN    piperacillin-tazobactam (ZOSYN) 3,375 mg in sodium chloride 0.9 % 50 mL IVPB (mini-bag)  3,375 mg IntraVENous Q12H    [Held by provider] carvedilol (COREG) tablet 3.125 mg  3.125 mg Oral BID WC    aspirin chewable

## 2023-10-04 NOTE — FLOWSHEET NOTE
10/04/23 1555   Vitals   BP (!) 152/70   Pulse 95   Respirations 16   SpO2 97 %   Peritoneal Dialysis Catheter Mid lower abdomen   No placement date or time found. Catheter Location: Mid lower abdomen   Status Accessed   Site Condition Clean, dry, intact   Dressing Status New dressing applied   Dressing Gauze   Date of Last Dressing Change 10/04/23   Dialysis Type Continuous cycling   Exit Site Condition Excellent   Catheter Care Given Yes   Cycler   Verification of Prescription CCPD   Informed Consent    (chronic consent applies)   Total Volume Programmed 26634 mL   Therapy Time (Hours:Minutes) 9:30   Cycler Type [facility]   Fill Volume 2300 mL   Last Fill Volume 0 mL   Number of Cycles 5   Bag Volume 6000 mL   Number of Bags Used 3   Dianeal Solution   (2.5% in 6000ml X3)        10/04/23 1555   Treatment   Time On 1555   Time Off 0125   Observations & Evaluations   Level of Consciousness 0   Oriented X 4   Heart Rhythm Regular   Respiratory Quality/Effort Unlabored   O2 Device None (Room air)   Skin Color   (appropriate for ethnicity)   Skin Condition/Temp Warm;Dry   Abdomen Inspection Soft;Rounded   Bowel Sounds (All Quadrants) Active   Edema None   Vital Signs   BP (!) 152/70   Pulse 95   Respirations 16   SpO2 97 %   Technical Checks   Dialysis Machine No. B/S-3   ICEBOAT I;C;E;B;O;A;T     Primary RN SBAR: Cliff Rivera RN  Patient Education: Pt orders, notes, labs and code status reviewed. Time out complete. CCPD initiated as per MD order. Start time: 1600  Est. End: 0130    Upon departure, line secured to abdomen, bed in lowest position, call bell and personal belongings within reach.      Hepatitis B Surface Ag   Date/Time Value Ref Range Status   10/01/2023 01:39 AM <0.10 Index Final     Hep B S Ab   Date/Time Value Ref Range Status   08/20/2023 10:01 AM <3.10 mIU/mL Final

## 2023-10-04 NOTE — PROGRESS NOTES
0800: Charting and patient care of Muriel Matthew by tremayne Michelle RN from 0442 to 0800 was supervised and reviewed by this RN.

## 2023-10-04 NOTE — WOUND CARE
WOCN Note:     New consult placed for assessment of buttocks and perineum. Chart reviewed. Assessed in room 457. Admitted DX: Acute hyponatremia     Assessment:   Patient is A&O x 4, communicative and sitting at the side of bed. Bed: foam mattress  Patient reports no pain. Heels intact without erythema. There is dark splotchy dyschromia to heels. 1. POA Buttocks and perineum:  mild denudation from exposure to moisture. Cleansed and applied Zinc cream.    Wound, Pressure Prevention & Skin Care Recommendations:    Minimize layers of linen/pads under patient to optimize support surface. 2.  Turn/reposition approximately every 2 hours and offload heels. 3.  Manage moisture/ Keep skin folds clean and dry/minimize brief usage. 4.  Buttocks and perineum:  Cleanse and apply Zinc cream every 8 hours and with incontinence care. Discussed above plan with patient and RN.     Transition of Care:   Plan to follow as needed while admitted to hospital.    MARIELA Gifford RN [facility]patient [facility]ound [facility]  Available on [employer]  Office [phone]

## 2023-10-04 NOTE — PROGRESS NOTES
[address] [facility]  Hospitalist Group                                                                                          Hospitalist Progress Note  Safia Anders MD  Answering service: [phone] from in house phone        Date of Service:  10/4/2023  NAME:  Giuliano Doran  :  1955  MRN:  [id number]       Admission Summary:     Giuliano Doran is a 70 y.o. female with PMH of ASHD (atherosclerotic heart disease, NSTEMI, CAD s/p CABG x3 2023, ESRD on PD, hypertension, hyperlipidemia, GERD, type 2 diabetes mellitus, COPD, asthma, tobacco abuse, peripheral neuropathy, sepsis, bacteremia, pleural effusion presented to the emergency department chief complaints of nausea, vomiting, generalized weakness. Patient is a limited historian. She is accompanied by her daughter who provides additional history. Remainder of history is obtained per review of ED and electronic medical records. Of note, patient was recently hospitalized on 2023- 2023 with diagnosis of NSTEMI, elevated troponin, colitis, pleural effusion, hypokalemia, lactic acidosis, shortness of breath, sepsis with acute organ dysfunction, bacteremia (micrococcus species; gram-positive cocci in clusters), acute on subacute endocarditis. On 2023, patient underwent CABG x3 (LMA-LAD, SVG- OM1 and OM2). Since discharge, patient reportedly has had ongoing nausea, vomiting nonbloody nonbilious emesis, poor appetite, limited oral intake, generalized weakness. Her daughter notes in the last 4 days, patient is passed out (syncope/near syncope) at least 6 times with some episodes of falling onto the floor. She was not noted if patient hit her head. Patient's not complain of any slurred speech, facial droop, focal weakness, new onset numbness except for chronic neuropathy.  Patient also now complains of having severe generalized abdominal pain, rated 7 out of 10, constant, without specific exacerbating leaving solution 2.5 mg  2.5 mg Nebulization Q6H PRN    potassium bicarb-citric acid (EFFER-K) effervescent tablet 40 mEq  40 mEq Oral Q4H    [START ON 10/5/2023] fluconazole (DIFLUCAN) tablet 100 mg  100 mg Oral Daily    dianeal lo-brandi (ULTRABAG) 2.5% 6,000 mL  6,000 mL IntraPERitoneal Daily    dianeal lo-brandi (ULTRABAG) 2.5% 6,000 mL  6,000 mL IntraPERitoneal Daily    dianeal lo-brandi (ULTRABAG) 2.5% 6,000 mL  6,000 mL IntraPERitoneal Daily    ondansetron (ZOFRAN) injection 4 mg  4 mg IntraVENous Q6H PRN    gentamicin (GARAMYCIN) 0.1 % cream   Topical PRN    HYDROmorphone HCl PF (DILAUDID) injection 0.25 mg  0.25 mg IntraVENous Q4H PRN    piperacillin-tazobactam (ZOSYN) 3,375 mg in sodium chloride 0.9 % 50 mL IVPB (mini-bag)  3,375 mg IntraVENous Q12H    [Held by provider] carvedilol (COREG) tablet 3.125 mg  3.125 mg Oral BID WC    aspirin chewable tablet 81 mg  81 mg Oral Daily    DULoxetine (CYMBALTA) extended release capsule 20 mg  20 mg Oral BID    folic acid (FOLVITE) tablet 1 mg  1 mg Oral Daily    insulin glargine (LANTUS) injection vial 20 Units  20 Units SubCUTAneous Nightly    magnesium oxide (MAG-OX) tablet 400 mg  400 mg Oral BID    melatonin tablet 6 mg  6 mg Oral Nightly PRN    montelukast (SINGULAIR) tablet 10 mg  10 mg Oral Nightly    pantoprazole (PROTONIX) tablet 40 mg  40 mg Oral Daily    rosuvastatin (CRESTOR) tablet 40 mg  40 mg Oral Daily    sodium chloride flush 0.9 % injection 5-40 mL  5-40 mL IntraVENous 2 times per day    sodium chloride flush 0.9 % injection 5-40 mL  5-40 mL IntraVENous PRN    0.9 % sodium chloride infusion   IntraVENous PRN    heparin (porcine) injection 5,000 Units  5,000 Units SubCUTAneous 3 times per day    polyethylene glycol (GLYCOLAX) packet 17 g  17 g Oral Daily PRN    acetaminophen (TYLENOL) tablet 650 mg  650 mg Oral Q6H PRN    Or    acetaminophen (TYLENOL) suppository 650 mg  650 mg Rectal Q6H PRN    simethicone (MYLICON) chewable tablet 80 mg  80 mg Oral TID PRN

## 2023-10-04 NOTE — PROGRESS NOTES
Continue antibiotics for UTI. Continue to monitor sternal drainage.   Medical management per primary team.      Signed By: Dilma Thomson PA-C

## 2023-10-05 LAB
ALBUMIN SERPL-MCNC: 2 G/DL (ref 3.5–5)
ALBUMIN/GLOB SERPL: 0.4 (ref 1.1–2.2)
ALP SERPL-CCNC: 619 U/L (ref 45–117)
ALT SERPL-CCNC: 16 U/L (ref 12–78)
ANION GAP SERPL CALC-SCNC: 11 MMOL/L (ref 5–15)
AST SERPL-CCNC: 54 U/L (ref 15–37)
BILIRUB SERPL-MCNC: 0.4 MG/DL (ref 0.2–1)
BUN SERPL-MCNC: 42 MG/DL (ref 6–20)
BUN/CREAT SERPL: 6 (ref 12–20)
CALCIUM SERPL-MCNC: 8 MG/DL (ref 8.5–10.1)
CHLORIDE SERPL-SCNC: 94 MMOL/L (ref 97–108)
CO2 SERPL-SCNC: 26 MMOL/L (ref 21–32)
CREAT SERPL-MCNC: 7.56 MG/DL (ref 0.55–1.02)
ERYTHROCYTE [DISTWIDTH] IN BLOOD BY AUTOMATED COUNT: 17.1 % (ref 11.5–14.5)
GLOBULIN SER CALC-MCNC: 5.5 G/DL (ref 2–4)
GLUCOSE BLD STRIP.AUTO-MCNC: 218 MG/DL (ref 65–117)
GLUCOSE BLD STRIP.AUTO-MCNC: 222 MG/DL (ref 65–117)
GLUCOSE BLD STRIP.AUTO-MCNC: 229 MG/DL (ref 65–117)
GLUCOSE BLD STRIP.AUTO-MCNC: 241 MG/DL (ref 65–117)
GLUCOSE SERPL-MCNC: 390 MG/DL (ref 65–100)
HBV SURFACE AB SER QL: NONREACTIVE
HBV SURFACE AB SER-ACNC: <3.1 MIU/ML
HCT VFR BLD AUTO: 32.5 % (ref 35–47)
HGB BLD-MCNC: 9.9 G/DL (ref 11.5–16)
LACTATE SERPL-SCNC: 2.7 MMOL/L (ref 0.4–2)
LACTATE SERPL-SCNC: 3.4 MMOL/L (ref 0.4–2)
MCH RBC QN AUTO: 28.4 PG (ref 26–34)
MCHC RBC AUTO-ENTMCNC: 30.5 G/DL (ref 30–36.5)
MCV RBC AUTO: 93.1 FL (ref 80–99)
NRBC # BLD: 0 K/UL (ref 0–0.01)
NRBC BLD-RTO: 0 PER 100 WBC
PLATELET # BLD AUTO: 317 K/UL (ref 150–400)
PMV BLD AUTO: 9.8 FL (ref 8.9–12.9)
POTASSIUM SERPL-SCNC: 3.9 MMOL/L (ref 3.5–5.1)
PROT SERPL-MCNC: 7.5 G/DL (ref 6.4–8.2)
RBC # BLD AUTO: 3.49 M/UL (ref 3.8–5.2)
SERVICE CMNT-IMP: ABNORMAL
SODIUM SERPL-SCNC: 131 MMOL/L (ref 136–145)
WBC # BLD AUTO: 9.2 K/UL (ref 3.6–11)

## 2023-10-05 PROCEDURE — 2060000000 HC ICU INTERMEDIATE R&B

## 2023-10-05 PROCEDURE — 6360000002 HC RX W HCPCS: Performed by: HOSPITALIST

## 2023-10-05 PROCEDURE — 85027 COMPLETE CBC AUTOMATED: CPT

## 2023-10-05 PROCEDURE — 90945 DIALYSIS ONE EVALUATION: CPT

## 2023-10-05 PROCEDURE — 83605 ASSAY OF LACTIC ACID: CPT

## 2023-10-05 PROCEDURE — 80053 COMPREHEN METABOLIC PANEL: CPT

## 2023-10-05 PROCEDURE — 97116 GAIT TRAINING THERAPY: CPT

## 2023-10-05 PROCEDURE — 82962 GLUCOSE BLOOD TEST: CPT

## 2023-10-05 PROCEDURE — 6360000002 HC RX W HCPCS: Performed by: FAMILY MEDICINE

## 2023-10-05 PROCEDURE — 6370000000 HC RX 637 (ALT 250 FOR IP): Performed by: FAMILY MEDICINE

## 2023-10-05 PROCEDURE — 86706 HEP B SURFACE ANTIBODY: CPT

## 2023-10-05 PROCEDURE — 36415 COLL VENOUS BLD VENIPUNCTURE: CPT

## 2023-10-05 PROCEDURE — 97530 THERAPEUTIC ACTIVITIES: CPT

## 2023-10-05 PROCEDURE — 97535 SELF CARE MNGMENT TRAINING: CPT

## 2023-10-05 PROCEDURE — 6370000000 HC RX 637 (ALT 250 FOR IP): Performed by: INTERNAL MEDICINE

## 2023-10-05 PROCEDURE — 2580000003 HC RX 258: Performed by: FAMILY MEDICINE

## 2023-10-05 PROCEDURE — 2580000003 HC RX 258: Performed by: INTERNAL MEDICINE

## 2023-10-05 RX ADMIN — HYDROCORTISONE: 25 CREAM TOPICAL at 21:13

## 2023-10-05 RX ADMIN — HEPARIN SODIUM 5000 UNITS: 5000 INJECTION INTRAVENOUS; SUBCUTANEOUS at 14:55

## 2023-10-05 RX ADMIN — PANTOPRAZOLE SODIUM 40 MG: 40 TABLET, DELAYED RELEASE ORAL at 09:06

## 2023-10-05 RX ADMIN — SODIUM CHLORIDE, SODIUM LACTATE, CALCIUM CHLORIDE, MAGNESIUM CHLORIDE AND DEXTROSE 6000 ML: 2.5; 538; 448; 18.3; 5.08 INJECTION, SOLUTION INTRAPERITONEAL at 17:18

## 2023-10-05 RX ADMIN — HEPARIN SODIUM 5000 UNITS: 5000 INJECTION INTRAVENOUS; SUBCUTANEOUS at 21:12

## 2023-10-05 RX ADMIN — ASPIRIN 81 MG CHEWABLE TABLET 81 MG: 81 TABLET CHEWABLE at 09:06

## 2023-10-05 RX ADMIN — GENTAMICIN SULFATE: 1 CREAM TOPICAL at 17:18

## 2023-10-05 RX ADMIN — INSULIN GLARGINE 20 UNITS: 100 INJECTION, SOLUTION SUBCUTANEOUS at 21:21

## 2023-10-05 RX ADMIN — HEPARIN SODIUM 5000 UNITS: 5000 INJECTION INTRAVENOUS; SUBCUTANEOUS at 06:22

## 2023-10-05 RX ADMIN — DULOXETINE HYDROCHLORIDE 20 MG: 20 CAPSULE, DELAYED RELEASE ORAL at 21:12

## 2023-10-05 RX ADMIN — SODIUM CHLORIDE, PRESERVATIVE FREE 10 ML: 5 INJECTION INTRAVENOUS at 09:06

## 2023-10-05 RX ADMIN — SODIUM CHLORIDE, PRESERVATIVE FREE 10 ML: 5 INJECTION INTRAVENOUS at 21:21

## 2023-10-05 RX ADMIN — MONTELUKAST 10 MG: 10 TABLET, FILM COATED ORAL at 21:12

## 2023-10-05 RX ADMIN — ROSUVASTATIN 10 MG: 10 TABLET, FILM COATED ORAL at 09:06

## 2023-10-05 RX ADMIN — DULOXETINE HYDROCHLORIDE 20 MG: 20 CAPSULE, DELAYED RELEASE ORAL at 09:06

## 2023-10-05 RX ADMIN — HYDROCORTISONE: 25 CREAM TOPICAL at 09:06

## 2023-10-05 RX ADMIN — MAGNESIUM OXIDE TAB 400 MG (241.3 MG ELEMENTAL MG) 400 MG: 400 (241.3 MG) TAB at 21:12

## 2023-10-05 RX ADMIN — ONDANSETRON 4 MG: 2 INJECTION INTRAMUSCULAR; INTRAVENOUS at 00:51

## 2023-10-05 RX ADMIN — FLUCONAZOLE 100 MG: 100 TABLET ORAL at 11:32

## 2023-10-05 RX ADMIN — MAGNESIUM OXIDE TAB 400 MG (241.3 MG ELEMENTAL MG) 400 MG: 400 (241.3 MG) TAB at 09:06

## 2023-10-05 RX ADMIN — Medication 1 MG: at 09:06

## 2023-10-05 ASSESSMENT — PAIN SCALES - GENERAL
PAINLEVEL_OUTOF10: 0

## 2023-10-05 NOTE — PLAN OF CARE
Problem: Physical Therapy - Adult  Goal: By Discharge: Performs mobility at highest level of function for planned discharge setting. See evaluation for individualized goals. Description: FUNCTIONAL STATUS PRIOR TO ADMISSION: Patient with hx CABG x 3 on 9/7/2023. Prior to CABG, patient was mod I with rollator. Since returning home from CABG sx, patient reports needing intermittent assistance from her son or daughter for all functional mobility and for bathing. Patient has been receiving [facility] PT since surgery. Patient reports 3 falls in the last few weeks related to passing out. HOME SUPPORT PRIOR TO ADMISSION: The patient lived with son, daughter and required minimal assistance for bathing, occasionally for basic household mobility. Physical Therapy Goals  Initiated 10/3/2023  1. Patient will move from supine to sit and sit to supine in bed with contact guard assist within 5 day(s). 2.  Patient will perform sit to stand with contact guard assist within 5 day(s). 3.  Patient will transfer from bed to chair and chair to bed with contact guard assist using the least restrictive device within 5 day(s). 4.  Patient will ambulate with contact guard assist for 50 feet with the least restrictive device within 5 day(s). 5.  Patient will ascend/descend 3 stairs with bilateral handrail(s) with minimal assistance within 5 day(s). 6.  Patient will perform cardiac exercises per protocol with supervision/set-up within 5 days.   7.  Patient will verbally recall and functionally demonstrate mindful-based movements (\"move in the tube\") principles without cues within 5 days.'  Outcome: Progressing     PHYSICAL THERAPY TREATMENT    Patient: Denice Hart (64 y.o. female)  Date: 10/5/2023  Diagnosis: Acute hyponatremia [E87.1]  Hyponatremia [E87.1]  General weakness [R53.1]  ESRD on peritoneal dialysis ([address]) [N18.6, Z99.2]  Nausea and vomiting, unspecified vomiting type [R11.2] Acute hyponatremia      Precautions: Fall Risk (Move in the tube)                    ASSESSMENT:  Patient continues to benefit from skilled PT services and is progressing towards goals. Received pt sitting in the chair amenable to therapy activity. She is orthostatic & dizzy when first standing, recovers with standing activity and with no worsening of her dizziness. She transitions positions and ambulates with the RW w/ overall CGA to Min A. Distance walked limited to 40 ft d/t activity tolerance. She is knowledgeable re: move in the tube precautions however requires Max cues (verbal and tactile) for compliance. Pt returned to bed to rest after therapy. Patient Vitals during session:   Pulse BP Patient Position   10/05/23 1620 (!) 104 (!) 146/73 Other (Comment)   10/05/23 1614 (!) 104 (!) 126/48 Other (Comment)   10/05/23 1612 (!) 102 (!) 88/53 Standing   10/05/23 1605 -- (!) 111/52 Sitting        PLAN:  Patient continues to benefit from skilled intervention to address the above impairments. Continue treatment per established plan of care. Recommendation for discharge: (in order for the patient to meet his/her long term goals): Therapy 2 days/week in the home and also see \"other factors to consider\" below for additional discharge concerns/needs    Other factors to consider for discharge:  fall risk, non compliant with move in the tube precautions    IF patient discharges home will need the following DME: patient owns DME required for discharge       SUBJECTIVE:   Patient stated, \"I told them they should just tie my arms down. They said they can't do that. \" (Re: move in the tube precautions)    OBJECTIVE DATA SUMMARY:   Critical Behavior:  Orientation  Overall Orientation Status: Within Normal Limits  Orientation Level: Oriented X4  Cognition  Overall Cognitive Status: WNL    Functional Mobility Training:  Bed Mobility:  Bed Mobility Training  Bed Mobility Training: Yes  Rolling: Minimum assistance;Assist X1  Supine to Sit: Assist X1;Minimum

## 2023-10-05 NOTE — PROGRESS NOTES
[facility]   [address], [address] [facility], [address] [facility]  Phone: [phone]   [phone]    [email or web address]     Nephrology Progress Note    Patient Name : Aysha Pat      : 1955     MRN : [id number]  Date of Admission : 2023  Date of Servive : 10/05/23    CC: Follow up for ESRD       Assessment and Plan   ESRD on PD:  - cont home PD Rx  - effluent clear, no clinical evidence of peritonitis  - continue fluconazole prophylaxis while on broad spectrum abx  - daily labs     Hyponatremia:  - Na of 116 on admission erroneous   -  Na stable    Hypokalemia:  - replete PRN    Serratia UTI:  - on zosyn     Anemia of CKD  - LIS MWF started    CAD  -Status post CABG 2023  - sternal wound drainage - minimal  - on abx     Hypertension  - BP stable     Diabetes  COPD     Interval History:  Seen and examined. Feeling better. Good UF with PD this AM.  Na stable. No cp, abd pain. Effluent clear. Review of Systems: Pertinent items are noted in HPI.     Current Medications:   Current Facility-Administered Medications   Medication Dose Route Frequency    albuterol (PROVENTIL) (2.5 MG/3ML) 0.083% nebulizer solution 2.5 mg  2.5 mg Nebulization Q6H PRN    fluconazole (DIFLUCAN) tablet 100 mg  100 mg Oral Daily    hydrocortisone (ANUSOL-HC) 2.5 % rectal cream   Rectal BID    rosuvastatin (CRESTOR) tablet 10 mg  10 mg Oral Daily    dianeal lo-brandi (ULTRABAG) 2.5% 6,000 mL  6,000 mL IntraPERitoneal Daily    dianeal lo-brandi (ULTRABAG) 2.5% 6,000 mL  6,000 mL IntraPERitoneal Daily    dianeal lo-brandi (ULTRABAG) 2.5% 6,000 mL  6,000 mL IntraPERitoneal Daily    ondansetron (ZOFRAN) injection 4 mg  4 mg IntraVENous Q6H PRN    gentamicin (GARAMYCIN) 0.1 % cream   Topical PRN    HYDROmorphone HCl PF (DILAUDID) injection 0.25 mg  0.25 mg IntraVENous Q4H PRN    [Held by provider] carvedilol (COREG) tablet 3.125 mg  3.125 mg Oral BID WC    aspirin chewable tablet 81 mg  81 mg Reviewed: I have reviewed all the pertinent labs, microbiology data and radiology studies during assessment. Labs:  Recent Labs     10/03/23  0231 10/04/23  0326 10/05/23  0041   * 130* 131*   K 2.5* 2.8* 3.9   CL 94* 96* 94*   CO2 25 25 26   GLUCOSE 203* 273* 390*   BUN 49* 46* 42*   CREATININE 8.37* 8.02* 7.56*   CALCIUM 7.6* 7.4* 8.0*         Recent Labs     10/03/23  0231 10/04/23  0326 10/05/23  0041   WBC 10.3 10.1 9.2   RBC 3.13* 3.13* 3.49*   HGB 8.9* 9.1* 9.9*   HCT 28.8* 29.4* 32.5*   MCV 92.0 93.9 93.1   MCH 28.4 29.1 28.4   MCHC 30.9 31.0 30.5   RDW 16.9* 17.1* 17.1*    312 317   MPV 10.2 10.6 9.8       Recent Labs     10/03/23  0231 10/04/23  0326 10/05/23  0041   GLOB 4.2* 4.9* 5.5*       No results for input(s): \"INR\", \"APTT\" in the last 72 hours. Invalid input(s): \"PTP\"     No results for input(s): \"CPK\", \"CKMB\", \"TROPONINI\" in the last 72 hours. Invalid input(s): \"B-NP\"  Invalid input(s): \"PHI\", \"PCO2I\", \"PO2I\", \"FIO2I\"     Ventilator:       Microbiology:  No results found for: \"SDES\"  No components found for: \"CULT\"      I have reviewed the flowsheets. Chart and Pertinent Notes have been reviewed. No change in PMH ,family and social history from Consult note.       Zeus Rajput MD  [facility]

## 2023-10-05 NOTE — PROGRESS NOTES
0800: Charting and patient care of Matthew Chang by tremayne Faria RN from 6031 to 0800 was supervised and reviewed by this RN.

## 2023-10-05 NOTE — FLOWSHEET NOTE
10/05/23 0300   Peritoneal Dialysis Catheter Mid lower abdomen   No placement date or time found. Catheter Location: Mid lower abdomen   Status Deaccessed   Site Condition Clean, dry, intact   Dressing Status Clean, dry & intact   Dressing Gauze   Date of Last Dressing Change 10/04/23   Dialysis Type Continuous cycling   Catheter Care Given Yes  (Two minute Alcavis scrub/soak performed prior to disconnection.)   Post-Treatment (Cycler)   Average Dwell Time (Hours:Minutes) 1:20   Lost Dwell Time (Hours:Minutes) :20   Effluent Appearance Clear;Yellow   PD Output (mL) 1086 mL  (Idrain 18ml + total UF 1068ml = net UF 1086 ml)     Primary RN SBAR: Jules Bowie RN  Comments: Sterile mini cap placed after patient disconnected. PD catheter taped securely to the patient's abdomen. Used cassette, lines and bags discarded in red bin. .  Patient left with bed low, side rails up X 2, call bell and belongings in reach.

## 2023-10-05 NOTE — PROGRESS NOTES
1930: Bedside shift change report given to Tia Mobley RN (oncoming nurse) by Jarod Botello RN (offgoing nurse). Report included the following information Nurse Handoff Report, Adult Overview, and Recent Results. 0730: Bedside shift change report given to Marta Cazares (oncoming nurse) by Tia Mobley RN (offgoing nurse). Report included the following information Nurse Handoff Report, Adult Overview, and Recent Results.

## 2023-10-05 NOTE — PROGRESS NOTES
[address] [facility]  Hospitalist Group                                                                                          Hospitalist Progress Note  Calvin Molina MD  Answering service: [phone] from in house phone        Date of Service:  10/5/2023  NAME:  Tonny Vinson  :  1955  MRN:  [id number]       Admission Summary:     Tonny Vinson is a 70 y.o. female with PMH of ASHD (atherosclerotic heart disease, NSTEMI, CAD s/p CABG x3 2023, ESRD on PD, hypertension, hyperlipidemia, GERD, type 2 diabetes mellitus, COPD, asthma, tobacco abuse, peripheral neuropathy, sepsis, bacteremia, pleural effusion presented to the emergency department chief complaints of nausea, vomiting, generalized weakness. Patient is a limited historian. She is accompanied by her daughter who provides additional history. Remainder of history is obtained per review of ED and electronic medical records. Of note, patient was recently hospitalized on 2023- 2023 with diagnosis of NSTEMI, elevated troponin, colitis, pleural effusion, hypokalemia, lactic acidosis, shortness of breath, sepsis with acute organ dysfunction, bacteremia (micrococcus species; gram-positive cocci in clusters), acute on subacute endocarditis. On 2023, patient underwent CABG x3 (LMA-LAD, SVG- OM1 and OM2). Since discharge, patient reportedly has had ongoing nausea, vomiting nonbloody nonbilious emesis, poor appetite, limited oral intake, generalized weakness. Her daughter notes in the last 4 days, patient is passed out (syncope/near syncope) at least 6 times with some episodes of falling onto the floor. She was not noted if patient hit her head. Patient's not complain of any slurred speech, facial droop, focal weakness, new onset numbness except for chronic neuropathy. Patient also now complains of having severe generalized abdominal pain, rated 7 out of 10, constant, without specific exacerbating leaving factors.

## 2023-10-05 NOTE — FLOWSHEET NOTE
10/05/23 1720   Vitals   BP (!) 159/75   Pulse 96   Respirations 23   SpO2 99 %   Peritoneal Dialysis Catheter Mid lower abdomen   No placement date or time found. Catheter Location: Mid lower abdomen   Status Accessed   Site Condition Clean, dry, intact   Dressing Status New dressing applied   Dressing Gauze   Date of Last Dressing Change 10/05/23   Dialysis Type Continuous cycling   Exit Site Condition good   Catheter Care Given Yes   Cycler   Verification of Prescription CCPD   Informed Consent    (chronic consent applies)   Total Volume Programmed 53413 mL   Therapy Time (Hours:Minutes) 9:30   Cycler Type [facility]   Fill Volume 2300 mL   Last Fill Volume 0 mL   Number of Cycles 5   Bag Volume 6000 mL   Number of Bags Used 3   Dianeal Solution   (3-2.5% Dianeal in 6000ml)        10/05/23 1720   Treatment   Time On 1720   Time Off 0250   Observations & Evaluations   Level of Consciousness 0   Oriented X 4   Respiratory Quality/Effort Unlabored   O2 Device None (Room air)   Skin Color   (appropriate for ethnicity)   Skin Condition/Temp Warm;Dry   Abdomen Inspection Soft;Rounded   Bowel Sounds (All Quadrants) Active   Edema None   Vital Signs   BP (!) 159/75   Pulse 96   Respirations 23   SpO2 99 %   Technical Checks   ICEBOAT I;C;E;B;O;A;T     Patient Education: Pt orders, notes, labs and  code status reviewed. Time out complete. CCPD initiated as per MD order. Remained present during initial drain followed by first fill. Upon departure, bed in lowest position, call bell and personal belongings within reach.  Education pre/post  Hepatitis B Surface Ag   Date/Time Value Ref Range Status   10/01/2023 01:39 AM <0.10 Index Final     Hep B S Ab   Date/Time Value Ref Range Status   10/05/2023 12:41 AM <3.10 mIU/mL Final

## 2023-10-05 NOTE — PLAN OF CARE
Problem: Occupational Therapy - Adult  Goal: By Discharge: Performs self-care activities at highest level of function for planned discharge setting. See evaluation for individualized goals. Description: FUNCTIONAL STATUS PRIOR TO ADMISSION:  Patient is readmitted s/p CABG 9/7/2023. Since then, has required assistance from son/daughter for most functional mobility and ADL. Was receiving HH PT/OT but limited the few days before admission. Reports 3 syncopal falls since discharge    HOME SUPPORT: Patient lived children who assists with ADL/IADL. Occupational Therapy Goals:  Initiated 10/4/2023  1. Patient will perform standing grooming with Supervision within 7 day(s). 2.  Patient will perform LE dressing with Minimal Assist within 7 day(s). 3.  Patient will perform UE dressing with Contact Guard Assist within 7 day(s). 4.  Patient will perform toilet transfers with Contact Guard Assist  within 7 day(s). 5.  Patient will perform all aspects of toileting with Contact Guard Assist within 7 day(s). 6.  Patient will participate in upper extremity therapeutic exercise/activities with Contact Guard Assist for 10 minutes within 7 day(s). 7.  Patient will utilize energy conservation techniques during functional activities with verbal cues within 7 day(s). Outcome: Progressing   OCCUPATIONAL THERAPY TREATMENT  Patient: Jose Dodd (68 y.o. female)  Date: 10/5/2023  Primary Diagnosis: Acute hyponatremia [E87.1]  Hyponatremia [E87.1]  General weakness [R53.1]  ESRD on peritoneal dialysis ([address]) [N18.6, Z99.2]  Nausea and vomiting, unspecified vomiting type [R11.2]       Precautions: Fall Risk (Move in the tube)                Chart, occupational therapy assessment, plan of care, and goals were reviewed. ASSESSMENT  Patient continues to benefit from skilled OT services and is progressing towards goals. Patient received reclined in bed, amenable to session.  Patient continues to be limited by orthostatic

## 2023-10-05 NOTE — PROGRESS NOTES
2115 Mathew served MD to make aware of possible prolapsed rectum per night nurse. He advised he will assess patient during his rounds. Upon my assessment patient has a hemorrhoid MD messaged and medications ordered.

## 2023-10-06 VITALS
OXYGEN SATURATION: 100 % | BODY MASS INDEX: 26.38 KG/M2 | HEIGHT: 64 IN | WEIGHT: 154.54 LBS | DIASTOLIC BLOOD PRESSURE: 64 MMHG | RESPIRATION RATE: 20 BRPM | SYSTOLIC BLOOD PRESSURE: 141 MMHG | HEART RATE: 100 BPM | TEMPERATURE: 97.5 F

## 2023-10-06 PROBLEM — E87.1 ACUTE HYPONATREMIA: Status: RESOLVED | Noted: 2023-09-30 | Resolved: 2023-10-06

## 2023-10-06 LAB
ALBUMIN SERPL-MCNC: 1.6 G/DL (ref 3.5–5)
ALBUMIN/GLOB SERPL: 0.3 (ref 1.1–2.2)
ALP SERPL-CCNC: 492 U/L (ref 45–117)
ALT SERPL-CCNC: 16 U/L (ref 12–78)
ANION GAP SERPL CALC-SCNC: 8 MMOL/L (ref 5–15)
AST SERPL-CCNC: 36 U/L (ref 15–37)
BACTERIA SPEC CULT: ABNORMAL
BACTERIA SPEC CULT: NORMAL
BACTERIA SPEC CULT: NORMAL
BILIRUB SERPL-MCNC: 0.3 MG/DL (ref 0.2–1)
BUN SERPL-MCNC: 40 MG/DL (ref 6–20)
BUN/CREAT SERPL: 5 (ref 12–20)
CALCIUM SERPL-MCNC: 7.8 MG/DL (ref 8.5–10.1)
CC UR VC: ABNORMAL
CHLORIDE SERPL-SCNC: 96 MMOL/L (ref 97–108)
CO2 SERPL-SCNC: 27 MMOL/L (ref 21–32)
CREAT SERPL-MCNC: 7.38 MG/DL (ref 0.55–1.02)
ERYTHROCYTE [DISTWIDTH] IN BLOOD BY AUTOMATED COUNT: 17.2 % (ref 11.5–14.5)
FERRITIN SERPL-MCNC: 2474 NG/ML (ref 26–388)
GLOBULIN SER CALC-MCNC: 4.8 G/DL (ref 2–4)
GLUCOSE BLD STRIP.AUTO-MCNC: 116 MG/DL (ref 65–117)
GLUCOSE SERPL-MCNC: 268 MG/DL (ref 65–100)
HCT VFR BLD AUTO: 28.3 % (ref 35–47)
HGB BLD-MCNC: 8.7 G/DL (ref 11.5–16)
IRON SATN MFR SERPL: 50 % (ref 20–50)
IRON SERPL-MCNC: 69 UG/DL (ref 35–150)
LACTATE SERPL-SCNC: 2.2 MMOL/L (ref 0.4–2)
LACTATE SERPL-SCNC: 3.4 MMOL/L (ref 0.4–2)
MCH RBC QN AUTO: 28.2 PG (ref 26–34)
MCHC RBC AUTO-ENTMCNC: 30.7 G/DL (ref 30–36.5)
MCV RBC AUTO: 91.9 FL (ref 80–99)
NRBC # BLD: 0 K/UL (ref 0–0.01)
NRBC BLD-RTO: 0 PER 100 WBC
PLATELET # BLD AUTO: 290 K/UL (ref 150–400)
PMV BLD AUTO: 9.8 FL (ref 8.9–12.9)
POTASSIUM SERPL-SCNC: 3.6 MMOL/L (ref 3.5–5.1)
PROT SERPL-MCNC: 6.4 G/DL (ref 6.4–8.2)
RBC # BLD AUTO: 3.08 M/UL (ref 3.8–5.2)
SERVICE CMNT-IMP: ABNORMAL
SERVICE CMNT-IMP: NORMAL
SODIUM SERPL-SCNC: 131 MMOL/L (ref 136–145)
TIBC SERPL-MCNC: 138 UG/DL (ref 250–450)
WBC # BLD AUTO: 9 K/UL (ref 3.6–11)

## 2023-10-06 PROCEDURE — 97530 THERAPEUTIC ACTIVITIES: CPT

## 2023-10-06 PROCEDURE — 83605 ASSAY OF LACTIC ACID: CPT

## 2023-10-06 PROCEDURE — 83550 IRON BINDING TEST: CPT

## 2023-10-06 PROCEDURE — 6360000002 HC RX W HCPCS: Performed by: HOSPITALIST

## 2023-10-06 PROCEDURE — 97535 SELF CARE MNGMENT TRAINING: CPT

## 2023-10-06 PROCEDURE — 82962 GLUCOSE BLOOD TEST: CPT

## 2023-10-06 PROCEDURE — 6370000000 HC RX 637 (ALT 250 FOR IP): Performed by: FAMILY MEDICINE

## 2023-10-06 PROCEDURE — 85027 COMPLETE CBC AUTOMATED: CPT

## 2023-10-06 PROCEDURE — 36415 COLL VENOUS BLD VENIPUNCTURE: CPT

## 2023-10-06 PROCEDURE — 2580000003 HC RX 258: Performed by: FAMILY MEDICINE

## 2023-10-06 PROCEDURE — 80053 COMPREHEN METABOLIC PANEL: CPT

## 2023-10-06 PROCEDURE — 83540 ASSAY OF IRON: CPT

## 2023-10-06 PROCEDURE — 82728 ASSAY OF FERRITIN: CPT

## 2023-10-06 PROCEDURE — 2580000003 HC RX 258: Performed by: HOSPITALIST

## 2023-10-06 PROCEDURE — 6360000002 HC RX W HCPCS: Performed by: FAMILY MEDICINE

## 2023-10-06 RX ORDER — FLUCONAZOLE 100 MG/1
100 TABLET ORAL DAILY
Qty: 7 TABLET | Refills: 0 | Status: ON HOLD | OUTPATIENT
Start: 2023-10-06 | End: 2023-10-13

## 2023-10-06 RX ORDER — LEVOFLOXACIN 750 MG/1
750 TABLET ORAL DAILY
Qty: 7 TABLET | Refills: 0 | Status: ON HOLD | OUTPATIENT
Start: 2023-10-06 | End: 2023-10-13

## 2023-10-06 RX ADMIN — ROSUVASTATIN 10 MG: 10 TABLET, FILM COATED ORAL at 08:53

## 2023-10-06 RX ADMIN — HYDROCORTISONE: 25 CREAM TOPICAL at 08:54

## 2023-10-06 RX ADMIN — MAGNESIUM OXIDE TAB 400 MG (241.3 MG ELEMENTAL MG) 400 MG: 400 (241.3 MG) TAB at 08:53

## 2023-10-06 RX ADMIN — DULOXETINE HYDROCHLORIDE 20 MG: 20 CAPSULE, DELAYED RELEASE ORAL at 08:53

## 2023-10-06 RX ADMIN — SODIUM CHLORIDE, PRESERVATIVE FREE 10 ML: 5 INJECTION INTRAVENOUS at 08:54

## 2023-10-06 RX ADMIN — WATER 1000 MG: 1 INJECTION INTRAMUSCULAR; INTRAVENOUS; SUBCUTANEOUS at 09:15

## 2023-10-06 RX ADMIN — Medication 1 MG: at 08:53

## 2023-10-06 RX ADMIN — HEPARIN SODIUM 5000 UNITS: 5000 INJECTION INTRAVENOUS; SUBCUTANEOUS at 06:09

## 2023-10-06 RX ADMIN — ASPIRIN 81 MG CHEWABLE TABLET 81 MG: 81 TABLET CHEWABLE at 08:53

## 2023-10-06 RX ADMIN — PANTOPRAZOLE SODIUM 40 MG: 40 TABLET, DELAYED RELEASE ORAL at 08:53

## 2023-10-06 ASSESSMENT — PAIN SCALES - GENERAL: PAINLEVEL_OUTOF10: 0

## 2023-10-06 NOTE — PROGRESS NOTES
1721      Medication List        CONTINUE taking these medications      Blood Glucose Monitor System w/Device Kit  1 Device by Does not apply route 4 times daily (before meals and nightly) Pharmacist to identify preferred meter and strips. Lancets 30G Misc  Test BG 4 times a day before meals and at bedtime. Dispense sufficient amount for indicated testing frequency plus additional to accommodate PRN testing needs. Pharmacist to identify preferred brand. ASK your doctor about these medications      acetaminophen 325 MG tablet  Commonly known as: TYLENOL  Take 3 tablets by mouth in the morning and 3 tablets at noon and 3 tablets in the evening and 3 tablets before bedtime. albuterol sulfate  (90 Base) MCG/ACT inhaler  Commonly known as: PROVENTIL;VENTOLIN;PROAIR     amiodarone 200 MG tablet  Commonly known as: CORDARONE  Take 2 tablets by mouth 2 times daily for 14 days, THEN 2 tablets daily for 14 days. Start taking on: September 14, 2023     aspirin 81 MG chewable tablet  Take 1 tablet by mouth daily     blood glucose test strips  Test 4 times a day before meals and at bedtime. Dispense sufficient amount for indicated testing frequency plus additional to accommodate PRN testing needs. Pharmacist to identify preferred brand.      carvedilol 6.25 MG tablet  Commonly known as: COREG  Take 1 tablet by mouth 2 times daily (with meals) Hold for HR < 60 or SBP < 110     diclofenac sodium 1 % Gel  Commonly known as: VOLTAREN     DULoxetine 20 MG extended release capsule  Commonly known as: CYMBALTA     folic acid 1 MG tablet  Commonly known as: FOLVITE  Take 1 tablet by mouth daily     insulin glargine 100 UNIT/ML injection vial  Commonly known as: LANTUS  Inject 20 Units into the skin nightly     ipratropium 0.5 mg-albuterol 2.5 mg 0.5-2.5 (3) MG/3ML Soln nebulizer solution  Commonly known as: DUONEB     magnesium oxide 400 (240 Mg) MG tablet  Commonly known as: MAG-OX  Take 1 tablet by

## 2023-10-06 NOTE — PLAN OF CARE
Problem: Physical Therapy - Adult  Goal: By Discharge: Performs mobility at highest level of function for planned discharge setting. See evaluation for individualized goals. Description: FUNCTIONAL STATUS PRIOR TO ADMISSION: Patient with hx CABG x 3 on 9/7/2023. Prior to CABG, patient was mod I with rollator. Since returning home from CABG sx, patient reports needing intermittent assistance from her son or daughter for all functional mobility and for bathing. [facility]. Patient reports 3 falls in the last few weeks related to passing out. HOME SUPPORT PRIOR TO ADMISSION: The patient lived with son, daughter and required minimal assistance for bathing, occasionally for basic household mobility. Physical Therapy Goals  Initiated 10/3/2023  1. Patient will move from supine to sit and sit to supine in bed with contact guard assist within 5 day(s). 2.  Patient will perform sit to stand with contact guard assist within 5 day(s). 3.  Patient will transfer from bed to chair and chair to bed with contact guard assist using the least restrictive device within 5 day(s). 4.  Patient will ambulate with contact guard assist for 50 feet with the least restrictive device within 5 day(s). 5.  Patient will ascend/descend 3 stairs with bilateral handrail(s) with minimal assistance within 5 day(s). 6.  Patient will perform cardiac exercises per protocol with supervision/set-up within 5 days. 7.  Patient will verbally recall and functionally demonstrate mindful-based movements (\"move in the tube\") principles without cues within 5 days. '  10/6/2023 1354 by Sim Rodriguez, SHAHRZAD  Outcome: Progressing     Problem: Occupational Therapy - Adult  Goal: By Discharge: Performs self-care activities at highest level of function for planned discharge setting. See evaluation for individualized goals.   Description: FUNCTIONAL STATUS PRIOR TO ADMISSION:  Patient is readmitted s/p CABG

## 2023-10-06 NOTE — PROGRESS NOTES
have provided independent interpretation of the same. Labs:     Recent Labs     10/05/23  0041 10/06/23  0126   WBC 9.2 9.0   HGB 9.9* 8.7*   HCT 32.5* 28.3*    290       Recent Labs     10/04/23  0326 10/05/23  0041 10/06/23  0126   * 131* 131*   K 2.8* 3.9 3.6   CL 96* 94* 96*   CO2 25 26 27   BUN 46* 42* 40*       Recent Labs     10/04/23  0326 10/05/23  0041 10/06/23  0126   ALT 15 16 16   GLOB 4.9* 5.5* 4.8*       No results for input(s): \"INR\", \"APTT\" in the last 72 hours. Invalid input(s): \"PTP\"     Recent Labs     10/06/23  0126   TIBC 138*      No results found for: \"FOL\", \"RBCF\"   No results for input(s): \"PH\", \"PCO2\", \"PO2\" in the last 72 hours. No results for input(s): \"CPK\" in the last 72 hours. Invalid input(s): \"CPKMB\", \"CKNDX\", \"TROIQ\"  No results found for: \"CHOL\", \"CHOLX\", \"CHLST\", \"CHOLV\", \"HDL\", \"HDLC\", \"LDL\", \"LDLC\", \"TGLX\", \"TRIGL\"  Lab Results   Component Value Date/Time    GLUCPOC 159 01/09/2023 03:20 PM    [address] 166 01/09/2023 03:05 PM     [unfilled]    Notes reviewed from all clinical/nonclinical/nursing services involved in patient's clinical care. Care coordination discussions were held with appropriate clinical/nonclinical/ nursing providers based on care coordination needs. Patients current active Medications were reviewed, considered, added and adjusted based on the clinical condition today. Home Medications were reconciled to the best of my ability given all available resources at the time of admission. Route is PO if not otherwise noted.       Admission Status:[id number]:::1}      Medications Reviewed:     Current Facility-Administered Medications   Medication Dose Route Frequency    cefTRIAXone (ROCEPHIN) 1,000 mg in sterile water 10 mL IV syringe  1,000 mg IntraVENous Q24H    epoetin mohan-epbx (RETACRIT) injection 10,000 Units  10,000 Units SubCUTAneous Once per day on Mon Wed Fri    albuterol (PROVENTIL) (2.5 MG/3ML) 0.083% nebulizer solution 2.5 mg  2.5 mg Nebulization Q6H PRN    fluconazole (DIFLUCAN) tablet 100 mg  100 mg Oral Daily    hydrocortisone (ANUSOL-HC) 2.5 % rectal cream   Rectal BID    rosuvastatin (CRESTOR) tablet 10 mg  10 mg Oral Daily    dianeal lo-brandi (ULTRABAG) 2.5% 6,000 mL  6,000 mL IntraPERitoneal Daily    dianeal lo-brandi (ULTRABAG) 2.5% 6,000 mL  6,000 mL IntraPERitoneal Daily    dianeal lo-brandi (ULTRABAG) 2.5% 6,000 mL  6,000 mL IntraPERitoneal Daily    ondansetron (ZOFRAN) injection 4 mg  4 mg IntraVENous Q6H PRN    gentamicin (GARAMYCIN) 0.1 % cream   Topical PRN    HYDROmorphone HCl PF (DILAUDID) injection 0.25 mg  0.25 mg IntraVENous Q4H PRN    [Held by provider] carvedilol (COREG) tablet 3.125 mg  3.125 mg Oral BID WC    aspirin chewable tablet 81 mg  81 mg Oral Daily    DULoxetine (CYMBALTA) extended release capsule 20 mg  20 mg Oral BID    folic acid (FOLVITE) tablet 1 mg  1 mg Oral Daily    insulin glargine (LANTUS) injection vial 20 Units  20 Units SubCUTAneous Nightly    magnesium oxide (MAG-OX) tablet 400 mg  400 mg Oral BID    melatonin tablet 6 mg  6 mg Oral Nightly PRN    montelukast (SINGULAIR) tablet 10 mg  10 mg Oral Nightly    pantoprazole (PROTONIX) tablet 40 mg  40 mg Oral Daily    sodium chloride flush 0.9 % injection 5-40 mL  5-40 mL IntraVENous 2 times per day    sodium chloride flush 0.9 % injection 5-40 mL  5-40 mL IntraVENous PRN    0.9 % sodium chloride infusion   IntraVENous PRN    heparin (porcine) injection 5,000 Units  5,000 Units SubCUTAneous 3 times per day    polyethylene glycol (GLYCOLAX) packet 17 g  17 g Oral Daily PRN    acetaminophen (TYLENOL) tablet 650 mg  650 mg Oral Q6H PRN    Or    acetaminophen (TYLENOL) suppository 650 mg  650 mg Rectal Q6H PRN    simethicone (MYLICON) chewable tablet 80 mg  80 mg Oral TID PRN     ______________________________________________________________________  EXPECTED LENGTH OF STAY: Unable to retrieve estimated LOS  ACTUAL LENGTH OF STAY:          6

## 2023-10-06 NOTE — PLAN OF CARE
Problem: Physical Therapy - Adult  Goal: By Discharge: Performs mobility at highest level of function for planned discharge setting. See evaluation for individualized goals. Description: FUNCTIONAL STATUS PRIOR TO ADMISSION: Patient with hx CABG x 3 on 9/7/2023. Prior to CABG, patient was mod I with rollator. Since returning home from CABG sx, patient reports needing intermittent assistance from her son or daughter for all functional mobility and for bathing. Patient has been receiving [facility] [facility] PT since surgery. Patient reports 3 falls in the last few weeks related to passing out. HOME SUPPORT PRIOR TO ADMISSION: The patient lived with son, daughter and required minimal assistance for bathing, occasionally for basic household mobility. Physical Therapy Goals  Initiated 10/3/2023  1. Patient will move from supine to sit and sit to supine in bed with contact guard assist within 5 day(s). 2.  Patient will perform sit to stand with contact guard assist within 5 day(s). 3.  Patient will transfer from bed to chair and chair to bed with contact guard assist using the least restrictive device within 5 day(s). 4.  Patient will ambulate with contact guard assist for 50 feet with the least restrictive device within 5 day(s). 5.  Patient will ascend/descend 3 stairs with bilateral handrail(s) with minimal assistance within 5 day(s). 6.  Patient will perform cardiac exercises per protocol with supervision/set-up within 5 days.   7.  Patient will verbally recall and functionally demonstrate mindful-based movements (\"move in the tube\") principles without cues within 5 days.'  Outcome: Progressing   PHYSICAL THERAPY TREATMENT    Patient: Nubia Espinoza (17 y.o. female)  Date: 10/6/2023  Diagnosis: Acute hyponatremia [E87.1]  Hyponatremia [E87.1]  General weakness [R53.1]  ESRD on peritoneal dialysis ([address]) [N18.6, Z99.2]  Nausea and vomiting, unspecified vomiting type [R11.2] Acute hyponatremia      Precautions: Fall

## 2023-10-06 NOTE — PROGRESS NOTES
[facility]   [address], [address] [facility], [address] [facility]  Phone: [phone]   [phone]    [email or web address]     Nephrology Progress Note    Patient Name : Bertha Fitzgerald      : 1955     MRN : [id number]  Date of Admission : 2023  Date of Servive : 10/06/23    CC: Follow up for ESRD       Assessment and Plan   ESRD on PD:  - cont home PD Rx  - effluent clear, no clinical evidence of peritonitis  - continue fluconazole prophylaxis while on broad spectrum abx  - daily labs     Hyponatremia:  - Na of 116 on admission erroneous   -  Na stable    Hypokalemia:  - replete PRN    Serratia UTI:  - on zosyn     Anemia of CKD  - decline in Hgb  - LIS 10 k MWF started  - will recheck iron stores    CAD  -Status post CABG 2023  - sternal wound drainage - minimal  - on abx     Hypertension  - BP stable     Diabetes  COPD     Interval History:  Seen and examined. Feeling better. Good UF with PD this AM.  Na unchanged. No issues with PD- fluid clear per patient/ nurse.  ml documented. Review of Systems: Pertinent items are noted in HPI.     Current Medications:   Current Facility-Administered Medications   Medication Dose Route Frequency    cefTRIAXone (ROCEPHIN) 1,000 mg in sterile water 10 mL IV syringe  1,000 mg IntraVENous Q24H    albuterol (PROVENTIL) (2.5 MG/3ML) 0.083% nebulizer solution 2.5 mg  2.5 mg Nebulization Q6H PRN    fluconazole (DIFLUCAN) tablet 100 mg  100 mg Oral Daily    hydrocortisone (ANUSOL-HC) 2.5 % rectal cream   Rectal BID    rosuvastatin (CRESTOR) tablet 10 mg  10 mg Oral Daily    dianeal lo-brandi (ULTRABAG) 2.5% 6,000 mL  6,000 mL IntraPERitoneal Daily    dianeal lo-brandi (ULTRABAG) 2.5% 6,000 mL  6,000 mL IntraPERitoneal Daily    dianeal lo-brandi (ULTRABAG) 2.5% 6,000 mL  6,000 mL IntraPERitoneal Daily    ondansetron (ZOFRAN) injection 4 mg  4 mg IntraVENous Q6H PRN    gentamicin (GARAMYCIN) 0.1 % cream   Topical PRN    HYDROmorphone

## 2023-10-06 NOTE — PROGRESS NOTES
1930: Bedside shift change report given to Xiang Salvador RN (oncoming nurse) by Makayla Loera RN (offgoing nurse). Report included the following information Nurse Handoff Report, Adult Overview, and Recent Results. 0730: Bedside shift change report given to Jairo Moon [address] (oncoming nurse) by Maricruz Howard (offgoing nurse). Report included the following information Nurse Handoff Report, Adult Overview, and Recent Results.

## 2023-10-06 NOTE — PLAN OF CARE
Problem: Occupational Therapy - Adult  Goal: By Discharge: Performs self-care activities at highest level of function for planned discharge setting. See evaluation for individualized goals. Description: FUNCTIONAL STATUS PRIOR TO ADMISSION:  Patient is readmitted s/p CABG 9/7/2023. Since then, has required assistance from son/daughter for most functional mobility and ADL. Was receiving HH PT/OT but limited the few days before admission. Reports 3 syncopal falls since discharge    HOME SUPPORT: Patient lived children who assists with ADL/IADL. Occupational Therapy Goals:  Initiated 10/4/2023  1. Patient will perform standing grooming with Supervision within 7 day(s). 2.  Patient will perform LE dressing with Minimal Assist within 7 day(s). 3.  Patient will perform UE dressing with Contact Guard Assist within 7 day(s). 4.  Patient will perform toilet transfers with Contact Guard Assist  within 7 day(s). 5.  Patient will perform all aspects of toileting with Contact Guard Assist within 7 day(s). 6.  Patient will participate in upper extremity therapeutic exercise/activities with Contact Guard Assist for 10 minutes within 7 day(s). 7.  Patient will utilize energy conservation techniques during functional activities with verbal cues within 7 day(s). Outcome: Progressing   OCCUPATIONAL THERAPY TREATMENT  Patient: Bertha Fitzgerald (51 y.o. female)  Date: 10/6/2023  Primary Diagnosis: Acute hyponatremia [E87.1]  Hyponatremia [E87.1]  General weakness [R53.1]  ESRD on peritoneal dialysis ([address]) [N18.6, Z99.2]  Nausea and vomiting, unspecified vomiting type [R11.2]       Precautions: Fall Risk (Move in the tube)                Chart, occupational therapy assessment, plan of care, and goals were reviewed. ASSESSMENT  Patient continues to benefit from skilled OT services and is progressing towards goals. Session limited by symptomatic orthostatic hypotension.  Patient able to engage in EOB LB dressing,

## 2023-10-07 NOTE — CARE COORDINATION
Care Management  - Received call from [facility] ([phone]) with [facility] - INPATIENT. They will need resumption of HH orders if MD would like to review home health. Spoke with Dr. Li Mahoney via [employer]. He gave verbal order to renew [facility]. Order written. Notified Shevonne that order is now in chart.    REMI Soni

## 2023-10-07 NOTE — DISCHARGE INSTRUCTIONS
Discharge Instructions       PATIENT ID: Tanya Frazier  MRN: [id number]   YOB: 1955    DATE OF ADMISSION: [unfilled]    DATE OF DISCHARGE: 10/6/2023    PRIMARY CARE PROVIDER: @PCP@     ATTENDING PHYSICIAN: [unfilled]  DISCHARGING PROVIDER: Ruy Spencer MD    To contact this individual call [phone] and ask the  to page. If unavailable ask to be transferred the Adult Hospitalist Department. DISCHARGE DIAGNOSES ***    CONSULTATIONS: [unfilled]    PROCEDURES/SURGERIES: * No surgery found *    PENDING TEST RESULTS:   At the time of discharge the following test results are still pending: ***    [facility] AP[facility]MENTS[facility]   [facility]     ADDITIONAL CARE RECOMMENDATIONS: ***    DIET: {diet:03320}  Oral Nutritional Supplements: {RDSUPPLEMENTLIST:28432} {RDSUPPFREQUENCY:16966}     ACTIVITY: {discharge activity:05870}    WOUND CARE: ***    EQUIPMENT needed: ***      Radiology      DISCHARGE MEDICATIONS:   See Medication Reconciliation Form    It is important that you take the medication exactly as they are prescribed. Keep your medication in the bottles provided by the pharmacist and keep a list of the medication names, dosages, and times to be taken in your wallet. Do not take other medications without consulting your doctor. NOTIFY YOUR PHYSICIAN FOR ANY OF THE FOLLOWING:   Fever over 101 degrees for 24 hours. Chest pain, shortness of breath, fever, chills, nausea, vomiting, diarrhea, change in mentation, falling, weakness, bleeding. Severe pain or pain not relieved by medications. Or, any other signs or symptoms that you may have questions about.       DISPOSITION:    Home With:   OT  PT  HH  RN       SNF/Inpatient Rehab/LTAC    Independent/assisted living    Hospice    Other:     CDMP Checked:   Yes ***     PROBLEM LIST Updated:  Yes ***       Signed:   Ruy Spencer MD  10/6/2023  9:49 PM

## 2023-10-07 NOTE — DISCHARGE SUMMARY
Base) MCG/ACT inhaler  Commonly known as: PROVENTIL;VENTOLIN;PROAIR     amiodarone 200 MG tablet  Commonly known as: CORDARONE  Take 2 tablets by mouth 2 times daily for 14 days, THEN 2 tablets daily for 14 days. Start taking on: September 14, 2023     aspirin 81 MG chewable tablet  Take 1 tablet by mouth daily     Blood Glucose Monitor System w/Device Kit  1 Device by Does not apply route 4 times daily (before meals and nightly) Pharmacist to identify preferred meter and strips. carvedilol 6.25 MG tablet  Commonly known as: COREG  Take 1 tablet by mouth 2 times daily (with meals) Hold for HR < 60 or SBP < 110     diclofenac sodium 1 % Gel  Commonly known as: VOLTAREN     DULoxetine 20 MG extended release capsule  Commonly known as: CYMBALTA     folic acid 1 MG tablet  Commonly known as: FOLVITE  Take 1 tablet by mouth daily     insulin glargine 100 UNIT/ML injection vial  Commonly known as: LANTUS  Inject 20 Units into the skin nightly     ipratropium 0.5 mg-albuterol 2.5 mg 0.5-2.5 (3) MG/3ML Soln nebulizer solution  Commonly known as: DUONEB     Lancets 30G Misc  Test BG 4 times a day before meals and at bedtime. Dispense sufficient amount for indicated testing frequency plus additional to accommodate PRN testing needs. Pharmacist to identify preferred brand.      magnesium oxide 400 (240 Mg) MG tablet  Commonly known as: MAG-OX  Take 1 tablet by mouth 2 times daily     melatonin 3 MG Tabs tablet  Take 2 tablets by mouth nightly as needed (insomnia)     methocarbamol 500 MG tablet  Commonly known as: ROBAXIN     montelukast 10 MG tablet  Commonly known as: SINGULAIR     ondansetron 4 MG disintegrating tablet  Commonly known as: ZOFRAN-ODT  Take 1 tablet by mouth 3 times daily as needed for Nausea or Vomiting     pantoprazole 40 MG tablet  Commonly known as: PROTONIX  Take 1 tablet by mouth daily     rosuvastatin 40 MG tablet  Commonly known as: CRESTOR            STOP taking these medications      blood

## 2023-10-08 ENCOUNTER — HOME CARE VISIT (OUTPATIENT)
Dept: HOME HEALTH SERVICES | Facility: HOME HEALTH | Age: 68
End: 2023-10-08
Payer: MEDICARE

## 2023-10-09 ENCOUNTER — HOME CARE VISIT (OUTPATIENT)
Facility: HOME HEALTH | Age: 68
End: 2023-10-09
Payer: MEDICARE

## 2023-10-09 VITALS
HEIGHT: 64 IN | TEMPERATURE: 97 F | RESPIRATION RATE: 16 BRPM | DIASTOLIC BLOOD PRESSURE: 72 MMHG | SYSTOLIC BLOOD PRESSURE: 140 MMHG | BODY MASS INDEX: 26.29 KG/M2 | HEART RATE: 100 BPM | WEIGHT: 154 LBS | OXYGEN SATURATION: 97 %

## 2023-10-09 PROCEDURE — G0299 HHS/HOSPICE OF RN EA 15 MIN: HCPCS

## 2023-10-09 ASSESSMENT — ENCOUNTER SYMPTOMS
PAIN LOCATION - PAIN QUALITY: ACHY, TENDER
STOOL DESCRIPTION: SOFT FORMED
DYSPNEA ACTIVITY LEVEL: AFTER AMBULATING MORE THAN 20 FT
BOWEL INCONTINENCE: 1
HEMOPTYSIS: 0

## 2023-10-10 NOTE — PROGRESS NOTES
Physician Progress Note      Sabino Bautista  [facility] #:                  [id number]  :                       1955  ADMIT DATE:       2023 5:41 PM  [address] DATE:        10/6/2023 7:20 PM  RESPONDING  PROVIDER #:        Wale Diaz MD          QUERY TEXT:    Patient admitted with hyponatremia. Noted documentation of sepsis in DC   summary). In order to support the diagnosis of sepsis, please include   additional clinical indicators in your documentation. Or please document if   the diagnosis of sepsis has been ruled out after further study    The medical record reflects the following:  Risk Factors: UTI  Clinical Indicators:  ED  129/67 97.6 ? F (36.4 ? C) Oral 82 16    H/P  Leukocytosis unspecified  -WBC 13.9, neutrophils 84%  -there is no identified infection and no reported/ documented fever today  -does not meet sepsis criteria  -if patient spike temperature with fever, then order blood, peritoneal, and   urine (if possible) cultures and start empiric antibiotics. -repeat WBC in a.m.    DCS  ? UTI with possible sepsis POA  -evidence by leukocytosis, elevated lactic acid 4.8, altered mental status  -on iv zosyn 10/1  -urine cx grow serratia, sensitivity pending  -leukocytosis improved, afebrile  -lactic acid improving, monitor lactate level until normalized  -chest x ray clear lung    Treatment: zosyn IV    Thank you,  Alek Hoover RN, CCDS, [facility]  Certified Clinical Documentation   [phone]  Options provided:  -- Sepsis present on admission  as evidenced by, Please document evidence.   -- Sepsis was ruled out after study  -- Other - I will add my own diagnosis  -- Disagree - Not applicable / Not valid  -- Disagree - Clinically unable to determine / Unknown  -- Refer to Clinical Documentation Reviewer    PROVIDER RESPONSE TEXT:    Sepsis is present on admission as evidenced by leukocytosis, elevated lactate,   AMS    Query created by: Franco Longoria on

## 2023-10-10 NOTE — HOME HEALTH
Resumption of care completed for 70year old female patient of Dr. Zina Valencia after hospitalization at [facility] from 9/30-10/6/23 for acute hyponatremia, syncope and collapse due to orthostatic hypotension, ESRD on PD, UTI with possible sepsis with urine culture grpowing serratia, and acute metabolic encephalopathy. Patient discharged home on Levaquin and Fluconazole x 7 days. Patient just started medication this morning due to daughter not realizing it was at the pharmacy. Patient with recent CABG  on 9/7/23. Sternum surgical site and left lower leg surgical sites are clean, dry and intact with no signs of infection. Left lower abdomen periotoneal dialysis site is clean dry and intact with no signs of infection. New pink shallow ulcers with clean wound bed noted to right buttocks and left buttocks. Dr. Willard Daily notified via [employer] message and medihoney alginate and foam dressings ordered for wound care. Dressing changes to be performed twice weekly and as needed. Patient requires skilled nursing for cardiopulmonary assessment, genitourinary assessment, wound care, and medication management to reduce infection and prevent rehospitalization. Diagnosis: Acute hyponatremia    Subjective: Patient complains of chronic pain to sternum surgical site. Caregiver: daughter. Caregiver assists with: Medications, Meals, Bathing, ADL, IADL, Wound Care, Transportation and Housekeeping. Caregiver is available 24 hours/day Caregiver is present at this visit and did participate with clinician. Medications reconciled and all medications are available in the home this visit. Patient/CG able to demonstrate knowledge through teach back with 50 percent accuracy. No severe medication interactions noted. A list of reconciled medications has been given to the patient/caregiver.     High risk med teaching was performed on:   High alert medication teaching on aspirin, antiplatelet therapy education;purpose, dose, and frequency,

## 2023-10-11 ENCOUNTER — TELEPHONE (OUTPATIENT)
Age: 68
End: 2023-10-11

## 2023-10-11 ENCOUNTER — HOME CARE VISIT (OUTPATIENT)
Facility: HOME HEALTH | Age: 68
End: 2023-10-11
Payer: MEDICARE

## 2023-10-11 VITALS
HEART RATE: 96 BPM | OXYGEN SATURATION: 98 % | DIASTOLIC BLOOD PRESSURE: 64 MMHG | SYSTOLIC BLOOD PRESSURE: 110 MMHG | TEMPERATURE: 97.8 F

## 2023-10-11 VITALS
HEART RATE: 90 BPM | SYSTOLIC BLOOD PRESSURE: 80 MMHG | RESPIRATION RATE: 18 BRPM | TEMPERATURE: 97.1 F | OXYGEN SATURATION: 99 % | DIASTOLIC BLOOD PRESSURE: 50 MMHG

## 2023-10-11 PROCEDURE — G0152 HHCP-SERV OF OT,EA 15 MIN: HCPCS

## 2023-10-11 PROCEDURE — G0151 HHCP-SERV OF PT,EA 15 MIN: HCPCS

## 2023-10-11 ASSESSMENT — ENCOUNTER SYMPTOMS: DYSPNEA ACTIVITY LEVEL: AFTER AMBULATING LESS THAN 10 FT

## 2023-10-11 NOTE — HOME HEALTH
Reason for referral, [facility] SUMMARY of clinical condition: Ms. Gladys Villafana is a 69 yo female who is being re-evaluated for [facility] PT after re-hospitalization at [facility] from 9/30-10/6/23 due to acute hyponatremia, syncope and collapse due to orthostatic hypotension after recent CABG. She was also found to have a UTI and is currently still taking abx. PMHX includes ESRD on daily PD, HTN, HLD, NSTEMI, HF, DM, sepsis. She now has two buttocks decubiti as well. She lives in a one story home with 4 TANIA with multiple family members who care for her. Prior to CABG she describes being independent. She presents today as fatigued, in bed, describes getting dizzy upon standing and earlier today almost fell when being assisted to the bathroom by her son. She is fully oriented and VS are all within parameters except BP. In supine it is 110/60 but in sitting she becomes dizzy, BP is faint and approx 80/50 and she must return to supine. She states she has been drinking normally, did dialysis earlier today but hasn't eaten much and doesn't usually eat until later in the day. PT called  who is also scheduled to visit her today to update. PT called cardiologist's office but it went straight to  so PT left a message about her condition as patient is refusing to go to the hospital currently. Skilled PT is recommended 1w1 2w4 if she remains at home and becomes stable for therapy to address functional mobility deficits, strength, balance so she may return to an independent level of function. Subjective (statement from pt/cg that is relative to why you are there): patient describes discomfort in post L ribs that wraps around abdomen, reports feeling dizzy upon sitting    Caregiver: relative. Caregiver assists with: all care Caregiver unable to assist with: none. Caregiver is available Regularly Caregiver is  present at this visit and did participate with clinician. Medications reconciled and all medications are available in the home this visit.

## 2023-10-11 NOTE — TELEPHONE ENCOUNTER
CALLED PT TODAY WITH [facility] APPT. WITH DR. Angelina Wiley, 11-14 @ 11:15, ONLY THING HE HAD AND THEY WORKED HIM IN [employer] @ [facility]. SPOKE TO PT AND SHE WROTE IT DOWN.

## 2023-10-11 NOTE — HOME HEALTH
Reason for referral, [facility] SUMMARY of clinical condition: Pt is 70year old female admitted to home care for aftercare following surigcal procedure; rehospitalized for weakness, pain, vomiting and re-infection. Occupational Therapy needed for ADL training, functional mobility, strengthening. Clinical Assessment/Skilled reason for admission to home health (What this means for the patient overall and need for ongoing skilled care):OT assessed pt for ADL performance indicating pt requires mod assist toileting, LB dressing, max assist for bathing, and min assist for UB dressing and grooming. Barthel score of 45/100 indicating dependence. BUE strength of 3/5 MMT, seated functional reach of 3 inches. Pt with debilitation requiring increased assistance. Diagnosis: encounter following surgical procedure    Subjective (statement from pt/cg that is relative to why you are there): I can't do anything    Caregiver: daughter, family. Caregiver assists with: Medications, Meals, Bathing, ADL, IADL and Transportation Caregiver unable to assist with: NA. Caregiver is available Regularly Caregiver is  present at this visit and did participate with clinician. Medications reconciled and all medications are available in the home this visit. The following education was provided regarding medications: medication interactions and look alike medications. Patient/CG able to demonstrate knowledge through teach back with 75 percent accuracy. A list of reconciled medications has been reviewed    Patient at risk for falls Yes:   Recommended requesting PT/OT orders due to fall risk YES:   Patient response to recommended requesting of PT/OT orders: Agreeable to POC    Interdisciplinary communication with: Discussed pt condition and POC with PT  Written Teaching Material Utilized: N/A    Patient/caregiver instructed on plan of care and are agreeable to plan of care at this time.       Discharge planning discussed with patient and

## 2023-10-11 NOTE — TELEPHONE ENCOUNTER
I left a voicemail on Ms. Aguilar's phone reiterating to her to continue to hold her coreg, change positions slowly, and to drink a bit more fluid during the day, especially at the beginning of the day.

## 2023-10-12 ENCOUNTER — HOME CARE VISIT (OUTPATIENT)
Facility: HOME HEALTH | Age: 68
End: 2023-10-12
Payer: MEDICARE

## 2023-10-12 ENCOUNTER — HOSPITAL ENCOUNTER (INPATIENT)
Facility: HOSPITAL | Age: 68
LOS: 8 days | Discharge: HOME | DRG: 073 | End: 2023-10-21
Attending: EMERGENCY MEDICINE | Admitting: FAMILY MEDICINE
Payer: MEDICARE

## 2023-10-12 ENCOUNTER — APPOINTMENT (OUTPATIENT)
Facility: HOSPITAL | Age: 68
DRG: 073 | End: 2023-10-12
Payer: MEDICARE

## 2023-10-12 ENCOUNTER — TELEPHONE (OUTPATIENT)
Age: 68
End: 2023-10-12

## 2023-10-12 VITALS
DIASTOLIC BLOOD PRESSURE: 56 MMHG | TEMPERATURE: 97.2 F | OXYGEN SATURATION: 100 % | RESPIRATION RATE: 16 BRPM | HEART RATE: 98 BPM | SYSTOLIC BLOOD PRESSURE: 108 MMHG

## 2023-10-12 DIAGNOSIS — R10.13 ABDOMINAL PAIN, EPIGASTRIC: ICD-10-CM

## 2023-10-12 DIAGNOSIS — R79.89 ELEVATED TROPONIN: Primary | ICD-10-CM

## 2023-10-12 DIAGNOSIS — N18.6 ESRD (END STAGE RENAL DISEASE) (HCC): ICD-10-CM

## 2023-10-12 DIAGNOSIS — N39.0 COMPLICATED UTI (URINARY TRACT INFECTION): ICD-10-CM

## 2023-10-12 LAB
ALBUMIN SERPL-MCNC: 2 G/DL (ref 3.5–5)
ALBUMIN/GLOB SERPL: 0.3 (ref 1.1–2.2)
ALP SERPL-CCNC: 272 U/L (ref 45–117)
ALT SERPL-CCNC: 14 U/L (ref 12–78)
ANION GAP SERPL CALC-SCNC: 14 MMOL/L (ref 5–15)
AST SERPL-CCNC: 25 U/L (ref 15–37)
BASOPHILS # BLD: 0.1 K/UL (ref 0–0.1)
BASOPHILS NFR BLD: 1 % (ref 0–1)
BILIRUB SERPL-MCNC: 0.5 MG/DL (ref 0.2–1)
BUN SERPL-MCNC: 34 MG/DL (ref 6–20)
BUN/CREAT SERPL: 4 (ref 12–20)
CALCIUM SERPL-MCNC: 8.9 MG/DL (ref 8.5–10.1)
CHLORIDE SERPL-SCNC: 91 MMOL/L (ref 97–108)
CO2 SERPL-SCNC: 25 MMOL/L (ref 21–32)
COMMENT:: NORMAL
CREAT SERPL-MCNC: 8.2 MG/DL (ref 0.55–1.02)
DIFFERENTIAL METHOD BLD: ABNORMAL
EOSINOPHIL # BLD: 0.1 K/UL (ref 0–0.4)
EOSINOPHIL NFR BLD: 1 % (ref 0–7)
ERYTHROCYTE [DISTWIDTH] IN BLOOD BY AUTOMATED COUNT: 17.5 % (ref 11.5–14.5)
GLOBULIN SER CALC-MCNC: 6.5 G/DL (ref 2–4)
GLUCOSE BLD STRIP.AUTO-MCNC: 228 MG/DL (ref 65–117)
GLUCOSE SERPL-MCNC: 231 MG/DL (ref 65–100)
HCT VFR BLD AUTO: 30.4 % (ref 35–47)
HGB BLD-MCNC: 9.1 G/DL (ref 11.5–16)
IMM GRANULOCYTES # BLD AUTO: 0.1 K/UL (ref 0–0.04)
IMM GRANULOCYTES NFR BLD AUTO: 1 % (ref 0–0.5)
LACTATE SERPL-SCNC: 4.1 MMOL/L (ref 0.4–2)
LIPASE SERPL-CCNC: 153 U/L (ref 13–75)
LYMPHOCYTES # BLD: 1.5 K/UL (ref 0.8–3.5)
LYMPHOCYTES NFR BLD: 14 % (ref 12–49)
MAGNESIUM SERPL-MCNC: 3.4 MG/DL (ref 1.6–2.4)
MCH RBC QN AUTO: 28.5 PG (ref 26–34)
MCHC RBC AUTO-ENTMCNC: 29.9 G/DL (ref 30–36.5)
MCV RBC AUTO: 95.3 FL (ref 80–99)
MONOCYTES # BLD: 0.4 K/UL (ref 0–1)
MONOCYTES NFR BLD: 3 % (ref 5–13)
NEUTS SEG # BLD: 9 K/UL (ref 1.8–8)
NEUTS SEG NFR BLD: 80 % (ref 32–75)
NRBC # BLD: 0 K/UL (ref 0–0.01)
NRBC BLD-RTO: 0 PER 100 WBC
PLATELET # BLD AUTO: 297 K/UL (ref 150–400)
PMV BLD AUTO: 10.4 FL (ref 8.9–12.9)
POTASSIUM SERPL-SCNC: 3.2 MMOL/L (ref 3.5–5.1)
PROT SERPL-MCNC: 8.5 G/DL (ref 6.4–8.2)
RBC # BLD AUTO: 3.19 M/UL (ref 3.8–5.2)
SERVICE CMNT-IMP: ABNORMAL
SODIUM SERPL-SCNC: 130 MMOL/L (ref 136–145)
SPECIMEN HOLD: NORMAL
TROPONIN I SERPL HS-MCNC: 75 NG/L (ref 0–51)
WBC # BLD AUTO: 11.1 K/UL (ref 3.6–11)

## 2023-10-12 PROCEDURE — 83735 ASSAY OF MAGNESIUM: CPT

## 2023-10-12 PROCEDURE — 85025 COMPLETE CBC W/AUTO DIFF WBC: CPT

## 2023-10-12 PROCEDURE — 71045 X-RAY EXAM CHEST 1 VIEW: CPT

## 2023-10-12 PROCEDURE — 83690 ASSAY OF LIPASE: CPT

## 2023-10-12 PROCEDURE — 74176 CT ABD & PELVIS W/O CONTRAST: CPT

## 2023-10-12 PROCEDURE — 83605 ASSAY OF LACTIC ACID: CPT

## 2023-10-12 PROCEDURE — 36415 COLL VENOUS BLD VENIPUNCTURE: CPT

## 2023-10-12 PROCEDURE — 99285 EMERGENCY DEPT VISIT HI MDM: CPT

## 2023-10-12 PROCEDURE — 80053 COMPREHEN METABOLIC PANEL: CPT

## 2023-10-12 PROCEDURE — 82962 GLUCOSE BLOOD TEST: CPT

## 2023-10-12 PROCEDURE — 96375 TX/PRO/DX INJ NEW DRUG ADDON: CPT

## 2023-10-12 PROCEDURE — 6360000002 HC RX W HCPCS: Performed by: EMERGENCY MEDICINE

## 2023-10-12 PROCEDURE — 96365 THER/PROPH/DIAG IV INF INIT: CPT

## 2023-10-12 PROCEDURE — 87040 BLOOD CULTURE FOR BACTERIA: CPT

## 2023-10-12 PROCEDURE — 71250 CT THORAX DX C-: CPT

## 2023-10-12 PROCEDURE — 2580000003 HC RX 258: Performed by: EMERGENCY MEDICINE

## 2023-10-12 PROCEDURE — 84484 ASSAY OF TROPONIN QUANT: CPT

## 2023-10-12 PROCEDURE — 93005 ELECTROCARDIOGRAM TRACING: CPT | Performed by: STUDENT IN AN ORGANIZED HEALTH CARE EDUCATION/TRAINING PROGRAM

## 2023-10-12 PROCEDURE — G0299 HHS/HOSPICE OF RN EA 15 MIN: HCPCS

## 2023-10-12 RX ORDER — 0.9 % SODIUM CHLORIDE 0.9 %
500 INTRAVENOUS SOLUTION INTRAVENOUS ONCE
Status: COMPLETED | OUTPATIENT
Start: 2023-10-12 | End: 2023-10-13

## 2023-10-12 RX ORDER — FENTANYL CITRATE 50 UG/ML
50 INJECTION, SOLUTION INTRAMUSCULAR; INTRAVENOUS ONCE
Status: COMPLETED | OUTPATIENT
Start: 2023-10-12 | End: 2023-10-13

## 2023-10-12 RX ORDER — ONDANSETRON 2 MG/ML
4 INJECTION INTRAMUSCULAR; INTRAVENOUS ONCE
Status: COMPLETED | OUTPATIENT
Start: 2023-10-12 | End: 2023-10-12

## 2023-10-12 RX ORDER — ASPIRIN 81 MG/1
324 TABLET, CHEWABLE ORAL ONCE
Status: COMPLETED | OUTPATIENT
Start: 2023-10-12 | End: 2023-10-13

## 2023-10-12 RX ADMIN — SODIUM CHLORIDE 500 ML: 9 INJECTION, SOLUTION INTRAVENOUS at 23:05

## 2023-10-12 RX ADMIN — ONDANSETRON 4 MG: 2 INJECTION INTRAMUSCULAR; INTRAVENOUS at 23:05

## 2023-10-12 ASSESSMENT — ENCOUNTER SYMPTOMS
ABDOMINAL DISTENTION: 0
VOMITING: 1
COUGH: 1
ANAL BLEEDING: 0
NAUSEA: 1
DIARRHEA: 1
SHORTNESS OF BREATH: 0
BLOOD IN STOOL: 0
ABDOMINAL PAIN: 1
PAIN LOCATION - PAIN QUALITY: SORE

## 2023-10-12 ASSESSMENT — PAIN DESCRIPTION - PAIN TYPE: TYPE: ACUTE PAIN

## 2023-10-12 ASSESSMENT — PAIN DESCRIPTION - LOCATION: LOCATION: CHEST

## 2023-10-12 ASSESSMENT — PAIN DESCRIPTION - ONSET: ONSET: ON-GOING

## 2023-10-12 ASSESSMENT — PAIN - FUNCTIONAL ASSESSMENT: PAIN_FUNCTIONAL_ASSESSMENT: PREVENTS OR INTERFERES SOME ACTIVE ACTIVITIES AND ADLS

## 2023-10-12 ASSESSMENT — PAIN DESCRIPTION - FREQUENCY: FREQUENCY: CONTINUOUS

## 2023-10-12 ASSESSMENT — PAIN DESCRIPTION - DIRECTION: RADIATING_TOWARDS: CHEST PAIN

## 2023-10-12 ASSESSMENT — PAIN DESCRIPTION - DESCRIPTORS: DESCRIPTORS: DISCOMFORT

## 2023-10-12 ASSESSMENT — PAIN SCALES - GENERAL: PAINLEVEL_OUTOF10: 9

## 2023-10-12 ASSESSMENT — PAIN DESCRIPTION - ORIENTATION: ORIENTATION: UPPER

## 2023-10-12 NOTE — HOME HEALTH
Subjective: I've been so dizzy and nauseated. Falls since last visit No(if yes complete the Fall Tracking Form and include bsrifallreport):   Caregiver involvement changes: n/a  Home health supplies by type and quantity ordered/delivered this visit include: n/a    Clinician asked if patient has had any physician contact since last home care visit and patient states: YES  Clinician asked if patient has any new or changed medications and patient states:  YES holding coreg at this time due to hypotension  If Yes, were medications reconciled? YES   Was the certifying physician notified of changes in medications? YES     Clinical assessment (what this visit means for the patient overall and need for ongoing skilled care) and progress or lack of progress towards SPECIFIC goals: The patient has been reporting dizziness and nausea/vomiting/dry heaves primarily when attempting to sit or stand. Assessed patient's blood pressure both lying and sitting on the side of the bed. While sitting, blood pressure was difficult to assess but it appeared her systolic was in the 45K. She was extremely symptomatic with dizziness. Patient returned to lying position[facility]. Cardiac surgery team notified of patient's status and they will reach out to daughter for next steps. Skilled nursing needed for continued cardiopulmonary assessment. Written Teaching Material Utilized: N/A    Interdisciplinary communication with: Finesse Ly NP for the purpose of management of orthostatic hypotension    Discharge planning as follows:  Is no longer homebound, Per physician order and When goals are met    Specific plan for next visit: cardiopulmonary assessment

## 2023-10-12 NOTE — TELEPHONE ENCOUNTER
I had initially spoke to Mrs. Aguilar's daughter Nnamdi Murphy on Tuesday 10/10I called Gabino Salinas back from [facility] who stated she saw Mrs. Aguilar this morning. She took Mrs. Aguilar's blood pressure while lying down and it was normal but when they attempted to sit up, she became extremely orthostatic with SBP in the 70s. [facility] RN denied any fever or chills when she saw her. [facility] was going to see the patient today and evaluate. Spoke to daughter who states things are still not going well. She is experiencing dry heaving, vomiting, weakness, and fell this morning. Additionally, she states that [facility] is not remembering events that happened (like the fall this morning). I told the daughter Nnamdi Murphy that I think they need to bring [facility] in for admission. It sounds as though she is septic again. They are planning to bring her in today. The daughter is frustrated that no one went over the discharge paperwork or plans with her during the previous admission as she would have prioritized getting the antibiotic picked up. (Discharged Saturday, antibiotic was not picked up till Monday once the daughter was able to read through the discharge instructions and  was verifying medications with them).      Kayla Sheldon NP Cardiac Surgery

## 2023-10-13 PROBLEM — R79.89 ELEVATED TROPONIN: Status: ACTIVE | Noted: 2023-10-13

## 2023-10-13 LAB
ALBUMIN SERPL-MCNC: 1.6 G/DL (ref 3.5–5)
ALBUMIN/GLOB SERPL: 0.3 (ref 1.1–2.2)
ALP SERPL-CCNC: 215 U/L (ref 45–117)
ALT SERPL-CCNC: 11 U/L (ref 12–78)
ANION GAP SERPL CALC-SCNC: 10 MMOL/L (ref 5–15)
ANION GAP SERPL CALC-SCNC: 9 MMOL/L (ref 5–15)
APPEARANCE FLD: CLEAR
APPEARANCE UR: ABNORMAL
APTT PPP: 28.3 SEC (ref 22.1–31)
AST SERPL-CCNC: 17 U/L (ref 15–37)
BACTERIA URNS QL MICRO: ABNORMAL /HPF
BASOPHILS # BLD: 0.1 K/UL (ref 0–0.1)
BASOPHILS NFR BLD: 1 % (ref 0–1)
BILIRUB SERPL-MCNC: 0.5 MG/DL (ref 0.2–1)
BILIRUB UR QL: NEGATIVE
BUN SERPL-MCNC: 34 MG/DL (ref 6–20)
BUN SERPL-MCNC: 34 MG/DL (ref 6–20)
BUN/CREAT SERPL: 4 (ref 12–20)
BUN/CREAT SERPL: 4 (ref 12–20)
CALCIUM SERPL-MCNC: 7.8 MG/DL (ref 8.5–10.1)
CALCIUM SERPL-MCNC: 8.1 MG/DL (ref 8.5–10.1)
CHLORIDE SERPL-SCNC: 95 MMOL/L (ref 97–108)
CHLORIDE SERPL-SCNC: 98 MMOL/L (ref 97–108)
CO2 SERPL-SCNC: 25 MMOL/L (ref 21–32)
CO2 SERPL-SCNC: 28 MMOL/L (ref 21–32)
COLOR FLD: COLORLESS
COLOR UR: ABNORMAL
COMMENT:: NORMAL
CREAT SERPL-MCNC: 7.71 MG/DL (ref 0.55–1.02)
CREAT SERPL-MCNC: 8.06 MG/DL (ref 0.55–1.02)
DIFFERENTIAL METHOD BLD: ABNORMAL
EKG ATRIAL RATE: 97 BPM
EKG DIAGNOSIS: NORMAL
EKG P AXIS: 50 DEGREES
EKG P-R INTERVAL: 156 MS
EKG Q-T INTERVAL: 376 MS
EKG QRS DURATION: 86 MS
EKG QTC CALCULATION (BAZETT): 477 MS
EKG R AXIS: -51 DEGREES
EKG T AXIS: 109 DEGREES
EKG VENTRICULAR RATE: 97 BPM
EOSINOPHIL # BLD: 0 K/UL (ref 0–0.4)
EOSINOPHIL NFR BLD: 0 % (ref 0–7)
EPITH CASTS URNS QL MICRO: ABNORMAL /LPF
ERYTHROCYTE [DISTWIDTH] IN BLOOD BY AUTOMATED COUNT: 17.3 % (ref 11.5–14.5)
GLOBULIN SER CALC-MCNC: 5 G/DL (ref 2–4)
GLUCOSE SERPL-MCNC: 109 MG/DL (ref 65–100)
GLUCOSE SERPL-MCNC: 132 MG/DL (ref 65–100)
GLUCOSE UR STRIP.AUTO-MCNC: 500 MG/DL
HCT VFR BLD AUTO: 24.7 % (ref 35–47)
HGB BLD-MCNC: 7.5 G/DL (ref 11.5–16)
HGB UR QL STRIP: ABNORMAL
IMM GRANULOCYTES # BLD AUTO: 0.1 K/UL (ref 0–0.04)
IMM GRANULOCYTES NFR BLD AUTO: 1 % (ref 0–0.5)
INR PPP: 1.1 (ref 0.9–1.1)
KETONES UR QL STRIP.AUTO: NEGATIVE MG/DL
LACTATE SERPL-SCNC: 1.6 MMOL/L (ref 0.4–2)
LACTATE SERPL-SCNC: 2.9 MMOL/L (ref 0.4–2)
LEUKOCYTE ESTERASE UR QL STRIP.AUTO: ABNORMAL
LYMPHOCYTES # BLD: 1.4 K/UL (ref 0.8–3.5)
LYMPHOCYTES NFR BLD: 15 % (ref 12–49)
LYMPHOCYTES NFR FLD: 30 %
MAGNESIUM SERPL-MCNC: 3 MG/DL (ref 1.6–2.4)
MCH RBC QN AUTO: 28.6 PG (ref 26–34)
MCHC RBC AUTO-ENTMCNC: 30.4 G/DL (ref 30–36.5)
MCV RBC AUTO: 94.3 FL (ref 80–99)
MONOCYTES # BLD: 0.4 K/UL (ref 0–1)
MONOCYTES NFR BLD: 4 % (ref 5–13)
MONOS+MACROS NFR FLD: 70 %
NEUTS SEG # BLD: 7.3 K/UL (ref 1.8–8)
NEUTS SEG NFR BLD: 79 % (ref 32–75)
NITRITE UR QL STRIP.AUTO: NEGATIVE
NRBC # BLD: 0 K/UL (ref 0–0.01)
NRBC BLD-RTO: 0 PER 100 WBC
NUC CELL # FLD: 9 /CU MM
PH UR STRIP: 5.5 (ref 5–8)
PLATELET # BLD AUTO: 250 K/UL (ref 150–400)
PMV BLD AUTO: 9.8 FL (ref 8.9–12.9)
POTASSIUM SERPL-SCNC: 3.1 MMOL/L (ref 3.5–5.1)
POTASSIUM SERPL-SCNC: 3.4 MMOL/L (ref 3.5–5.1)
PROT SERPL-MCNC: 6.6 G/DL (ref 6.4–8.2)
PROT UR STRIP-MCNC: >300 MG/DL
PROTHROMBIN TIME: 11.4 SEC (ref 9–11.1)
RBC # BLD AUTO: 2.62 M/UL (ref 3.8–5.2)
RBC # FLD: 4 /CU MM
RBC #/AREA URNS HPF: ABNORMAL /HPF (ref 0–5)
SODIUM SERPL-SCNC: 132 MMOL/L (ref 136–145)
SODIUM SERPL-SCNC: 133 MMOL/L (ref 136–145)
SP GR UR REFRACTOMETRY: >1.03
SPECIMEN HOLD: NORMAL
SPECIMEN HOLD: NORMAL
SPECIMEN SOURCE FLD: ABNORMAL
THERAPEUTIC RANGE: NORMAL SECS (ref 58–77)
UROBILINOGEN UR QL STRIP.AUTO: 0.2 EU/DL (ref 0.2–1)
WBC # BLD AUTO: 9.2 K/UL (ref 3.6–11)
WBC URNS QL MICRO: >100 /HPF (ref 0–4)

## 2023-10-13 PROCEDURE — 6360000002 HC RX W HCPCS: Performed by: INTERNAL MEDICINE

## 2023-10-13 PROCEDURE — 2060000000 HC ICU INTERMEDIATE R&B

## 2023-10-13 PROCEDURE — 6360000002 HC RX W HCPCS: Performed by: HOSPITALIST

## 2023-10-13 PROCEDURE — 3E1M39Z IRRIGATION OF PERITONEAL CAVITY USING DIALYSATE, PERCUTANEOUS APPROACH: ICD-10-PCS | Performed by: INTERNAL MEDICINE

## 2023-10-13 PROCEDURE — 85730 THROMBOPLASTIN TIME PARTIAL: CPT

## 2023-10-13 PROCEDURE — 93010 ELECTROCARDIOGRAM REPORT: CPT | Performed by: INTERNAL MEDICINE

## 2023-10-13 PROCEDURE — 87070 CULTURE OTHR SPECIMN AEROBIC: CPT

## 2023-10-13 PROCEDURE — 6370000000 HC RX 637 (ALT 250 FOR IP): Performed by: INTERNAL MEDICINE

## 2023-10-13 PROCEDURE — 2580000003 HC RX 258: Performed by: FAMILY MEDICINE

## 2023-10-13 PROCEDURE — 6370000000 HC RX 637 (ALT 250 FOR IP): Performed by: FAMILY MEDICINE

## 2023-10-13 PROCEDURE — 6370000000 HC RX 637 (ALT 250 FOR IP): Performed by: EMERGENCY MEDICINE

## 2023-10-13 PROCEDURE — 36415 COLL VENOUS BLD VENIPUNCTURE: CPT

## 2023-10-13 PROCEDURE — 83735 ASSAY OF MAGNESIUM: CPT

## 2023-10-13 PROCEDURE — 85610 PROTHROMBIN TIME: CPT

## 2023-10-13 PROCEDURE — 80053 COMPREHEN METABOLIC PANEL: CPT

## 2023-10-13 PROCEDURE — 6360000002 HC RX W HCPCS: Performed by: FAMILY MEDICINE

## 2023-10-13 PROCEDURE — 87015 SPECIMEN INFECT AGNT CONCNTJ: CPT

## 2023-10-13 PROCEDURE — 6370000000 HC RX 637 (ALT 250 FOR IP): Performed by: HOSPITALIST

## 2023-10-13 PROCEDURE — 2580000003 HC RX 258: Performed by: INTERNAL MEDICINE

## 2023-10-13 PROCEDURE — 87205 SMEAR GRAM STAIN: CPT

## 2023-10-13 PROCEDURE — 83605 ASSAY OF LACTIC ACID: CPT

## 2023-10-13 PROCEDURE — 81001 URINALYSIS AUTO W/SCOPE: CPT

## 2023-10-13 PROCEDURE — 85025 COMPLETE CBC W/AUTO DIFF WBC: CPT

## 2023-10-13 PROCEDURE — 87086 URINE CULTURE/COLONY COUNT: CPT

## 2023-10-13 PROCEDURE — 89050 BODY FLUID CELL COUNT: CPT

## 2023-10-13 PROCEDURE — 6360000002 HC RX W HCPCS: Performed by: EMERGENCY MEDICINE

## 2023-10-13 PROCEDURE — 2580000003 HC RX 258: Performed by: EMERGENCY MEDICINE

## 2023-10-13 RX ORDER — SODIUM CHLORIDE 0.9 % (FLUSH) 0.9 %
5-40 SYRINGE (ML) INJECTION EVERY 12 HOURS SCHEDULED
Status: DISCONTINUED | OUTPATIENT
Start: 2023-10-13 | End: 2023-10-21 | Stop reason: HOSPADM

## 2023-10-13 RX ORDER — ACETAMINOPHEN 325 MG/1
650 TABLET ORAL EVERY 6 HOURS PRN
Status: DISCONTINUED | OUTPATIENT
Start: 2023-10-13 | End: 2023-10-21 | Stop reason: HOSPADM

## 2023-10-13 RX ORDER — MONTELUKAST SODIUM 10 MG/1
10 TABLET ORAL NIGHTLY
Status: DISCONTINUED | OUTPATIENT
Start: 2023-10-13 | End: 2023-10-21 | Stop reason: HOSPADM

## 2023-10-13 RX ORDER — GENTAMICIN SULFATE 1 MG/G
CREAM TOPICAL DAILY
Status: DISCONTINUED | OUTPATIENT
Start: 2023-10-13 | End: 2023-10-21 | Stop reason: HOSPADM

## 2023-10-13 RX ORDER — DULOXETIN HYDROCHLORIDE 20 MG/1
20 CAPSULE, DELAYED RELEASE ORAL 2 TIMES DAILY
Status: DISCONTINUED | OUTPATIENT
Start: 2023-10-13 | End: 2023-10-21 | Stop reason: HOSPADM

## 2023-10-13 RX ORDER — GENTAMICIN SULFATE 1 MG/G
CREAM TOPICAL PRN
Status: DISCONTINUED | OUTPATIENT
Start: 2023-10-13 | End: 2023-10-13

## 2023-10-13 RX ORDER — ACETAMINOPHEN 650 MG/1
650 SUPPOSITORY RECTAL EVERY 6 HOURS PRN
Status: DISCONTINUED | OUTPATIENT
Start: 2023-10-13 | End: 2023-10-21 | Stop reason: HOSPADM

## 2023-10-13 RX ORDER — ONDANSETRON 2 MG/ML
4 INJECTION INTRAMUSCULAR; INTRAVENOUS EVERY 6 HOURS PRN
Status: DISCONTINUED | OUTPATIENT
Start: 2023-10-13 | End: 2023-10-21 | Stop reason: HOSPADM

## 2023-10-13 RX ORDER — PANTOPRAZOLE SODIUM 40 MG/1
40 TABLET, DELAYED RELEASE ORAL DAILY
Status: DISCONTINUED | OUTPATIENT
Start: 2023-10-13 | End: 2023-10-16

## 2023-10-13 RX ORDER — IPRATROPIUM BROMIDE AND ALBUTEROL SULFATE 2.5; .5 MG/3ML; MG/3ML
1 SOLUTION RESPIRATORY (INHALATION) EVERY 6 HOURS PRN
Status: DISCONTINUED | OUTPATIENT
Start: 2023-10-13 | End: 2023-10-21 | Stop reason: HOSPADM

## 2023-10-13 RX ORDER — HYDROMORPHONE HYDROCHLORIDE 1 MG/ML
0.25 INJECTION, SOLUTION INTRAMUSCULAR; INTRAVENOUS; SUBCUTANEOUS EVERY 4 HOURS PRN
Status: DISCONTINUED | OUTPATIENT
Start: 2023-10-13 | End: 2023-10-21 | Stop reason: HOSPADM

## 2023-10-13 RX ORDER — CARVEDILOL 6.25 MG/1
6.25 TABLET ORAL 2 TIMES DAILY WITH MEALS
Status: DISCONTINUED | OUTPATIENT
Start: 2023-10-13 | End: 2023-10-14

## 2023-10-13 RX ORDER — SODIUM CHLORIDE, SODIUM LACTATE, CALCIUM CHLORIDE, MAGNESIUM CHLORIDE AND DEXTROSE 1.5; 538; 448; 18.3; 5.08 G/100ML; MG/100ML; MG/100ML; MG/100ML; MG/100ML
6000 INJECTION, SOLUTION INTRAPERITONEAL
Status: DISCONTINUED | OUTPATIENT
Start: 2023-10-13 | End: 2023-10-21 | Stop reason: HOSPADM

## 2023-10-13 RX ORDER — SODIUM CHLORIDE 9 MG/ML
INJECTION, SOLUTION INTRAVENOUS PRN
Status: DISCONTINUED | OUTPATIENT
Start: 2023-10-13 | End: 2023-10-21 | Stop reason: HOSPADM

## 2023-10-13 RX ORDER — INSULIN GLARGINE 100 [IU]/ML
20 INJECTION, SOLUTION SUBCUTANEOUS NIGHTLY
Status: DISCONTINUED | OUTPATIENT
Start: 2023-10-13 | End: 2023-10-17

## 2023-10-13 RX ORDER — SODIUM CHLORIDE, SODIUM LACTATE, CALCIUM CHLORIDE, MAGNESIUM CHLORIDE AND DEXTROSE 1.5; 538; 448; 18.3; 5.08 G/100ML; MG/100ML; MG/100ML; MG/100ML; MG/100ML
6000 INJECTION, SOLUTION INTRAPERITONEAL CONTINUOUS
Status: DISCONTINUED | OUTPATIENT
Start: 2023-10-13 | End: 2023-10-13

## 2023-10-13 RX ORDER — CASTOR OIL AND BALSAM, PERU 788; 87 MG/G; MG/G
OINTMENT TOPICAL 2 TIMES DAILY
Status: DISCONTINUED | OUTPATIENT
Start: 2023-10-13 | End: 2023-10-21 | Stop reason: HOSPADM

## 2023-10-13 RX ORDER — LANOLIN ALCOHOL/MO/W.PET/CERES
6 CREAM (GRAM) TOPICAL NIGHTLY PRN
Status: DISCONTINUED | OUTPATIENT
Start: 2023-10-13 | End: 2023-10-21 | Stop reason: HOSPADM

## 2023-10-13 RX ORDER — HEPARIN SODIUM 5000 [USP'U]/ML
5000 INJECTION, SOLUTION INTRAVENOUS; SUBCUTANEOUS EVERY 8 HOURS SCHEDULED
Status: DISCONTINUED | OUTPATIENT
Start: 2023-10-13 | End: 2023-10-13

## 2023-10-13 RX ORDER — OXYCODONE HYDROCHLORIDE 5 MG/1
5 TABLET ORAL EVERY 6 HOURS PRN
Status: DISCONTINUED | OUTPATIENT
Start: 2023-10-13 | End: 2023-10-21 | Stop reason: HOSPADM

## 2023-10-13 RX ORDER — ALBUTEROL SULFATE 90 UG/1
2 AEROSOL, METERED RESPIRATORY (INHALATION) EVERY 6 HOURS PRN
Status: DISCONTINUED | OUTPATIENT
Start: 2023-10-13 | End: 2023-10-15

## 2023-10-13 RX ORDER — SODIUM CHLORIDE 0.9 % (FLUSH) 0.9 %
5-40 SYRINGE (ML) INJECTION PRN
Status: DISCONTINUED | OUTPATIENT
Start: 2023-10-13 | End: 2023-10-21 | Stop reason: HOSPADM

## 2023-10-13 RX ORDER — FOLIC ACID 1 MG/1
1 TABLET ORAL DAILY
Status: DISCONTINUED | OUTPATIENT
Start: 2023-10-13 | End: 2023-10-21 | Stop reason: HOSPADM

## 2023-10-13 RX ORDER — METHOCARBAMOL 500 MG/1
500 TABLET, FILM COATED ORAL 3 TIMES DAILY PRN
Status: DISCONTINUED | OUTPATIENT
Start: 2023-10-13 | End: 2023-10-21 | Stop reason: HOSPADM

## 2023-10-13 RX ORDER — ROSUVASTATIN CALCIUM 40 MG/1
40 TABLET, COATED ORAL DAILY
Status: DISCONTINUED | OUTPATIENT
Start: 2023-10-13 | End: 2023-10-21 | Stop reason: HOSPADM

## 2023-10-13 RX ADMIN — Medication 6 MG: at 03:30

## 2023-10-13 RX ADMIN — GENTAMICIN SULFATE: 1 CREAM TOPICAL at 21:27

## 2023-10-13 RX ADMIN — HEPARIN SODIUM 5000 UNITS: 5000 INJECTION INTRAVENOUS; SUBCUTANEOUS at 06:37

## 2023-10-13 RX ADMIN — HEPARIN SODIUM 5000 UNITS: 5000 INJECTION INTRAVENOUS; SUBCUTANEOUS at 14:47

## 2023-10-13 RX ADMIN — SODIUM CHLORIDE, PRESERVATIVE FREE 10 ML: 5 INJECTION INTRAVENOUS at 21:32

## 2023-10-13 RX ADMIN — SODIUM CHLORIDE, SODIUM LACTATE, CALCIUM CHLORIDE, MAGNESIUM CHLORIDE AND DEXTROSE 6000 ML: 1.5; 538; 448; 18.3; 5.08 INJECTION, SOLUTION INTRAPERITONEAL at 21:30

## 2023-10-13 RX ADMIN — ASPIRIN 81 MG CHEWABLE TABLET 324 MG: 81 TABLET CHEWABLE at 00:15

## 2023-10-13 RX ADMIN — SODIUM CHLORIDE, PRESERVATIVE FREE 10 ML: 5 INJECTION INTRAVENOUS at 09:10

## 2023-10-13 RX ADMIN — OXYCODONE HYDROCHLORIDE 5 MG: 5 TABLET ORAL at 03:30

## 2023-10-13 RX ADMIN — Medication: at 14:47

## 2023-10-13 RX ADMIN — DULOXETINE HYDROCHLORIDE 20 MG: 20 CAPSULE, DELAYED RELEASE ORAL at 21:27

## 2023-10-13 RX ADMIN — ONDANSETRON 4 MG: 2 INJECTION INTRAMUSCULAR; INTRAVENOUS at 09:10

## 2023-10-13 RX ADMIN — MONTELUKAST 10 MG: 10 TABLET, FILM COATED ORAL at 21:27

## 2023-10-13 RX ADMIN — OXYCODONE HYDROCHLORIDE 5 MG: 5 TABLET ORAL at 21:31

## 2023-10-13 RX ADMIN — CARVEDILOL 6.25 MG: 3.12 TABLET, FILM COATED ORAL at 09:56

## 2023-10-13 RX ADMIN — FENTANYL CITRATE 50 MCG: 50 INJECTION INTRAMUSCULAR; INTRAVENOUS at 00:13

## 2023-10-13 RX ADMIN — VANCOMYCIN HYDROCHLORIDE 1250 MG: 1.25 INJECTION, POWDER, LYOPHILIZED, FOR SOLUTION INTRAVENOUS at 00:34

## 2023-10-13 RX ADMIN — ROSUVASTATIN CALCIUM 40 MG: 10 TABLET, FILM COATED ORAL at 11:00

## 2023-10-13 RX ADMIN — Medication 1 MG: at 09:56

## 2023-10-13 RX ADMIN — DULOXETINE HYDROCHLORIDE 20 MG: 20 CAPSULE, DELAYED RELEASE ORAL at 03:30

## 2023-10-13 RX ADMIN — ERYTHROPOIETIN 10000 UNITS: 10000 INJECTION, SOLUTION INTRAVENOUS; SUBCUTANEOUS at 21:27

## 2023-10-13 RX ADMIN — POTASSIUM BICARBONATE 40 MEQ: 782 TABLET, EFFERVESCENT ORAL at 11:00

## 2023-10-13 RX ADMIN — Medication: at 21:33

## 2023-10-13 RX ADMIN — CEFEPIME 2000 MG: 2 INJECTION, POWDER, FOR SOLUTION INTRAVENOUS at 00:17

## 2023-10-13 RX ADMIN — INSULIN GLARGINE 20 UNITS: 100 INJECTION, SOLUTION SUBCUTANEOUS at 21:27

## 2023-10-13 RX ADMIN — PANTOPRAZOLE SODIUM 40 MG: 40 TABLET, DELAYED RELEASE ORAL at 09:56

## 2023-10-13 ASSESSMENT — ENCOUNTER SYMPTOMS
CHEST TIGHTNESS: 0
VOMITING: 1
ABDOMINAL PAIN: 0
NAUSEA: 1
DIARRHEA: 1
SHORTNESS OF BREATH: 0

## 2023-10-13 ASSESSMENT — PAIN DESCRIPTION - LOCATION: LOCATION: ABDOMEN;BREAST

## 2023-10-13 ASSESSMENT — PAIN SCALES - GENERAL
PAINLEVEL_OUTOF10: 0
PAINLEVEL_OUTOF10: 0
PAINLEVEL_OUTOF10: 8
PAINLEVEL_OUTOF10: 7
PAINLEVEL_OUTOF10: 9
PAINLEVEL_OUTOF10: 0

## 2023-10-13 NOTE — ED NOTES
Bedside and Verbal shift change report given to Matias Doty (oncoming nurse) by Isabel Solis (offgoing nurse). Report included the following information ED Encounter Summary, ED SBAR, and Event Log.        Sapphire Perez RN  10/13/23 [id number]

## 2023-10-13 NOTE — PROGRESS NOTES
[facility]   [address], [address] [facility], [address] [facility]  Phone: [phone]   [phone]    [email or web address]     Nephrology Progress Note    Patient Name : Tonny Vinson      : 1955     MRN : [id number]  Date of Admission : 10/12/2023  Date of Servive : 10/13/23    CC: Follow up for ESRD       Assessment and Plan   ESRD on PD:  -CCPD nightly at home, patient of Dr. Boni Ardon clinic  -Home Rx: 5X 2300 mL, 9.5-hour treatment time, 1000 mL icodextrin last fill  - will resume PD tonight, 1.5% solutions  - daily labs    Abd pain:  - cell count and cx sent off  - on empiric abx     Anemia of CKD:  - LIS MWF    Hypokalemia:  - repletion ordered     Hypertension:  - hold BP meds for now    CAD s/p CABG 23     DM  COPD  HLD     Interval History:  Seen by my NP earlier this AM.  Feeling ok. Cell count and cx just sent. BP low. No cp, sob, n/v/d reported at this time. Review of Systems: Pertinent items are noted in HPI.     Current Medications:   Current Facility-Administered Medications   Medication Dose Route Frequency    dianeal lo-brandi 1.5% 6,000 mL  6,000 mL IntraPERitoneal Continuous    dianeal lo-brandi 1.5% 6,000 mL  6,000 mL IntraPERitoneal Continuous    dianeal lo-brandi 1.5% 6,000 mL  6,000 mL IntraPERitoneal Continuous    gentamicin (GARAMYCIN) 0.1 % cream   Topical PRN    sodium chloride flush 0.9 % injection 5-40 mL  5-40 mL IntraVENous 2 times per day    sodium chloride flush 0.9 % injection 5-40 mL  5-40 mL IntraVENous PRN    0.9 % sodium chloride infusion   IntraVENous PRN    acetaminophen (TYLENOL) tablet 650 mg  650 mg Oral Q6H PRN    Or    acetaminophen (TYLENOL) suppository 650 mg  650 mg Rectal Q6H PRN    heparin (porcine) injection 5,000 Units  5,000 Units SubCUTAneous 3 times per day    albuterol sulfate HFA (PROVENTIL;VENTOLIN;PROAIR) 108 (90 Base) MCG/ACT inhaler 2 puff  2 puff Inhalation Q6H PRN    carvedilol (COREG) tablet 6.25 mg

## 2023-10-13 NOTE — CONSULTS
NEPHROLOGY CONSULT NOTE     Patient: Tj Rachel MRN: [id number]  PCP: Linda Ibarra MD   :     1955  Age:   70 y.o. Sex:  female      Referring physician: Asher Cameron MD  Reason for consultation: 70 y.o. female with ESRD on PD  Chief Complaint: Weakness, diaphoresis, N/V  Admission Date: 10/12/2023 10:11 PM  LOS: 0 days      ASSESSMENT and PLAN :   ESRD on PD  -CCPD nightly at home, patient of Dr. Jelena Rucker clinic  -Home Rx: 5X 2300 mL, 9.5-hour treatment time, 1000 mL icodextrin last fill  -Last PD yesterday  -Resume home PD prescription during admission, will use 1.5% bag, appears volume down  -Denies abdominal pain, fever, cloudy/bloody/fibrin effluent, redness/drainage at catheter site. Low suspicion for peritonitis, but given constellation of symptoms will check fluid studies  -Covered with IV Vanocmycin/Cefepime in ED  -Discussed with PD nurse on-call for collection of PD fluid and to resume treatment  -Gent cream to exit site    Anemia of CKD  -HGB 9.1, near baseline    Hypertension  -Borderline hypotensive at present, resume home antihypertensives as condition improves    DM  COPD  [address] discussed with:  Patient, daughter, CCPD nurse        Thank you for consulting [facility] in the care of your patient. Subjective:   HPI: Tj Rachel is a 70 y.o.  female with past medical history of ESRD on PD, hypertension, hyperlipidemia, diabetes, CAD status post CABG 2023 who has been admitted to the hospital for weakness, fatigue, nausea, vomiting  Patient presents emergency department from home with complaint of generalized weakness and fatigue, progressively worsening since recent discharge 23. Since discharge, family has noted decreased appetite, weakness, near syncope, nausea/vomiting.  CCPD nightly at home, states that yesterday cycler was alarming 'low UF.'  Evaluation the emergency department revealed hypokalemia, Mouth: Mucous membranes are dry. Eyes:      Extraocular Movements: Extraocular movements intact. Cardiovascular:      Rate and Rhythm: Normal rate and regular rhythm. Pulses: Normal pulses. Heart sounds: Normal heart sounds. No murmur heard. Pulmonary:      Effort: Pulmonary effort is normal.      Breath sounds: Normal breath sounds. No wheezing. Abdominal:      Palpations: Abdomen is soft. Tenderness: There is no abdominal tenderness. Comments: PD catheter without redness/drainage   Skin:     General: Skin is warm and dry. Capillary Refill: Capillary refill takes less than 2 seconds. Neurological:      Mental Status: She is alert and oriented to person, place, and time. Mental status is at baseline.    Psychiatric:         Mood and Affect: Mood normal.         Behavior: Behavior normal.         Laboratory Results:    Recent Labs     10/12/23  2130   *   K 3.2*   CL 91*   CO2 25   BUN 34*   MG 3.4*   ALT 14     Recent Labs     10/12/23  2130   WBC 11.1*   HGB 9.1*   HCT 30.4*        No components found for: \"COLOR\", \"APPRN\", \"SPGRU\", \"REFSG\", \"NOBLE\", \"PROTU\", \"GLUCU\", \"KETU\", \"BILU\", \"UROU\", \"FRANNIE\", \"LEUKU\", \"GLUKE\", \"EPSU\", \"BACTU\", \"WBCU\", \"RBCU\", \"CASTS\", \"UCRY\"  Recent Results (from the past 24 hour(s))   CBC with Auto Differential    Collection Time: 10/12/23  9:30 PM   Result Value Ref Range    WBC 11.1 (H) 3.6 - 11.0 K/uL    RBC 3.19 (L) 3.80 - 5.20 M/uL    Hemoglobin 9.1 (L) 11.5 - 16.0 g/dL    Hematocrit 30.4 (L) 35.0 - 47.0 %    MCV 95.3 80.0 - 99.0 FL    MCH 28.5 26.0 - 34.0 PG    MCHC 29.9 (L) 30.0 - 36.5 g/dL    RDW 17.5 (H) 11.5 - 14.5 %    Platelets 097 916 - 916 K/uL    MPV 10.4 8.9 - 12.9 FL    Nucleated RBCs 0.0 0  WBC    nRBC 0.00 0.00 - 0.01 K/uL    Neutrophils % 80 (H) 32 - 75 %    Lymphocytes % 14 12 - 49 %    Monocytes % 3 (L) 5 - 13 %    Eosinophils % 1 0 - 7 %    Basophils % 1 0 - 1 %    Immature Granulocytes 1 (H) 0.0 - 0.5 %

## 2023-10-13 NOTE — PROGRESS NOTES
Hospitalist Progress Note  Alina Brannon MD  Answering service: [phone]        Date of Service:  10/13/2023  NAME:  Roberta Rendon  :  1955  MRN:  [id number]      Admission Summary:   Roberta Rendon is a 70 y.o. female with hx of cad s/p cabg, esrd on PD, chronic recurrent nausea, vomiting and abdominal pain, hfref who presented to ed with complaints of abdominal pain. Patient states she has had constant, aching, generalized abdominal pain for the last week and a half. States symptoms were present at time of discharge from hospital.  She was recently admitted and underwent CABG. She endorses aching sternal pain which she states is unchanged since her surgical procedure. She has had 1 episode of nonbloody, nonbilious emesis. Endorses malaise, fatigue and some anorexia due to her aching abdominal pain. Patient was just admitted from  to 10/6 for syncopal, UTI ,hyponatremia and similar abd pain, vomiting, nausea. The patient denies any fever, chills, chest pain, cough, congestion, recent illness, palpitations, or dysuria/urgency or frequency.      Remarkable vitals on ER Presentation: vss  Labs Remarkable for: lactic acid 2.9, cr 7.71, hgb 7.5, UA: lg bld, wbc, 1+ bact  ER Images: ct chest abd et pelvis: no acute process  ER Rx: asa 324, cefepime, cymbalta       Interval history / Subjective:   Abd pain mild better  Still having nausea      Assessment & Plan:     Recurrent Abdominal Pain / Nausea and Vomiting  -was just admitted from  to 10/6 with similar problems   -appears to be a chronic recurrent problem  -acute cystitis may also be contributory  -peritoneal fluid analysis : not significant for infection culture pending   -empiric cefepime  -morphine prn, zofran prn   -pd per nephro  -diet advance as tolerate     UTI  Abx as above  Last admission culture is serratia   Following cultures      Fatigue Q6H PRN    heparin (porcine) injection 5,000 Units  5,000 Units SubCUTAneous 3 times per day    albuterol sulfate HFA (PROVENTIL;VENTOLIN;PROAIR) 108 (90 Base) MCG/ACT inhaler 2 puff  2 puff Inhalation Q6H PRN    carvedilol (COREG) tablet 6.25 mg  6.25 mg Oral BID WC    DULoxetine (CYMBALTA) extended release capsule 20 mg  20 mg Oral BID    folic acid (FOLVITE) tablet 1 mg  1 mg Oral Daily    insulin glargine (LANTUS) injection vial 20 Units  20 Units SubCUTAneous Nightly    ipratropium 0.5 mg-albuterol 2.5 mg (DUONEB) nebulizer solution 1 Dose  1 Dose Inhalation Q6H PRN    melatonin tablet 6 mg  6 mg Oral Nightly PRN    montelukast (SINGULAIR) tablet 10 mg  10 mg Oral Nightly    methocarbamol (ROBAXIN) tablet 500 mg  500 mg Oral TID PRN    oxyCODONE (ROXICODONE) immediate release tablet 5 mg  5 mg Oral Q6H PRN    pantoprazole (PROTONIX) tablet 40 mg  40 mg Oral Daily    rosuvastatin (CRESTOR) tablet 40 mg  40 mg Oral Daily    [START ON 10/14/2023] cefepime (MAXIPIME) 1,000 mg in sodium chloride 0.9 % 50 mL IVPB (mini-bag)  1,000 mg IntraVENous Q24H    vancomycin (VANCOCIN) intermittent dosing (placeholder)   Other RX Placeholder    ondansetron (ZOFRAN) injection 4 mg  4 mg IntraVENous Q6H PRN    epoetin mohan-epbx (RETACRIT) injection 10,000 Units  10,000 Units SubCUTAneous Once per day on Mon Wed Fri    potassium bicarb-citric acid (EFFER-K) effervescent tablet 40 mEq  40 mEq Oral Daily    balsum peru-castor oil (VENELEX) ointment   Topical BID    dianeal lo-brandi 1.5% 6,000 mL  6,000 mL IntraPERitoneal Daily before dinner    dianeal lo-brandi 1.5% 6,000 mL  6,000 mL IntraPERitoneal Daily before dinner    gentamicin (GARAMYCIN) 0.1 % cream   Topical Daily     ______________________________________________________________________  EXPECTED LENGTH OF STAY: 6  ACTUAL LENGTH OF STAY:          0                 Chente Sadler MD

## 2023-10-13 NOTE — ED NOTES
TRANSFER - OUT REPORT:    Verbal report given to Angelo Harrison RN on [employer]  being transferred to [address] [place] [address] for routine progression of patient care       Report consisted of patient's Situation, Background, Assessment and   Recommendations(SBAR). Information from the following report(s) Nurse Handoff Report, ED SBAR, Intake/Output, MAR, and Cardiac Rhythm NSR  was reviewed with the receiving nurse. [place] Fall Assessment:    Presents to emergency department  because of falls (Syncope, seizure, or loss of consciousness): No  Age > 70: No  Altered Mental Status, Intoxication with alcohol or substance confusion (Disorientation, impaired judgment, poor safety awaremess, or inability to follow instructions): No  Impaired Mobility: Ambulates or transfers with assistive devices or assistance; Unable to ambulate or transer.: Yes  Nursing Judgement: Yes          Lines:   Peripheral IV 10/12/23 Left Antecubital (Active)   Site Assessment Clean, dry & intact 10/13/23 0813   Line Status Capped 10/13/23 0813   Phlebitis Assessment No symptoms 10/13/23 0813   Infiltration Assessment 0 10/13/23 0813   Alcohol Cap Used Yes 10/13/23 0813   Dressing Status Clean, dry & intact 10/13/23 0813   Dressing Type Transparent 10/13/23 0813   Dressing Intervention New 10/12/23 6962        Opportunity for questions and clarification was provided.       Patient transported with:  Monitor and Ritu King  10/13/23 0931

## 2023-10-13 NOTE — WOUND CARE
WOCN Note:     New consult for bed sores. Seen in ER 16    Chart shows:  Admitted on 10/13/23 from home. Admitted for elevated troponin. History of s/p CABG x3 9/7/23; ESRD with PD. Assessment:   Appropriately conversational and able to turn independently onto left side. Reports no pain. Wearing briefs and reports diarrhea. Clean and dry at time of my assessment. Surface: ER stretcher; heels offloaded with pillow  Diet: Regular; Low Fat/Low Chol/High Fiber/2 gm Na; Low Potassium (Less than 3000 mg/day)    Bilateral heels intact with non-blanching mottling; Heels offloaded with pillows. Sacrum intact without erythema; sacral foam in use. Bilateral upper buttocks have newly-resurfaced, fragile, pink/red tissue. Tiny dot of open area. Protected with foam dressing      Wound Recommendations:    Heels and buttocks: venelex twice daily; cover sacrum with foam    PI Prevention:  Turn/reposition approximately every 2 hours  Offload heels with heels hanging off end of pillow at all times while in bed. Sacral Foam dressing: lift to assess regularly; change as needed. Discontinue if incontinence is frequently soiling dressing. Dr. Jasper Ramos aware of findings.      Transition of Care: Plan to follow weekly and as needed while admitted to hospital.     LUCINDA LuqueN, RN, [facility] [place]  Certified Wound, Ostomy, Continence Nurse  office [phone]  Available via [facility]

## 2023-10-13 NOTE — ED PROVIDER NOTES
and /or transitional urothelial cells. Renal tubular cells, if present, are separately identified as such. BACTERIA, URINE 10/13/2023 1+ (A)  NEG /hpf Final    Specimen HOld 10/13/2023 Urine on hold in Microbiology dept for 2 days. If unpreserved urine is submitted, it cannot be used for addtional testing after 24 hours, recollection will be required. Final    Specimen HOld 10/12/2023 1red,1blue   Final    Comment: 10/12/2023 Add-on orders for these samples will be processed based on acceptable specimen integrity and analyte stability, which may vary by analyte. Final    Ventricular Rate 10/12/2023 97  BPM Final    Atrial Rate 10/12/2023 97  BPM Final    P-R Interval 10/12/2023 156  ms Final    QRS Duration 10/12/2023 86  ms Final    Q-T Interval 10/12/2023 376  ms Final    QTc Calculation (Bazett) 10/12/2023 477  ms Final    P Axis 10/12/2023 50  degrees Final    R Axis 10/12/2023 -51  degrees Final    T Axis 10/12/2023 109  degrees Final    Diagnosis 10/12/2023    Final                    Value:Normal sinus rhythm  Left axis deviation  Voltage criteria for left ventricular hypertrophy  Nonspecific ST abnormality    When compared with ECG of 30-SEP-2023 17:47,  No significant change    Confirmed by Marybeth Balderas M.D., Jayna Cunha ([id number]) on 10/13/2023 7:35:47 PM      Troponin, High Sensitivity 10/12/2023 75 (H)  0 - 51 ng/L Final    Comment: A HS troponin value change of (+ or -) 50% or more below the 99th percentile, in a 1/2/3 hr interval represents a significant change. Clinical correlation is recommended. A HS troponin value change of (+ or -) 20% or above the 99th percentile, in a 1/2/3 hr interval represents a significant change. Clinical correlation is recommended.   99th Percentile:   Women: 0-51 ng/L                                                                Men:   0-76 ng/L  Patients taking more than 20 mg/day of biotin may have falsely negative results and should not use this test.      POC Glucose cholecystitis/choledocholithiasis/cholangitis vs perforated viscus vs diverticulitis/colitis vs obstruction vs volvulus/intussusception vs incarcerated/strangulated hernia vs pancreatitis vs PUD vs gastritis, less likely ACS, AAA or mesenteric ischemia/ischemic bowel based on presentation. Ordered EKG, CT CAP, UA, labs. Monitor and reassess. 0100 Pt remains stable. EKG SR w/o ischemic changes. Labs show stable ESRD. Has a trace leukocytosis and lactic acidosis 4.1. Trop elevated 75 in setting of ESRD. LFTs ok. UA suggestive of infection, repeat urine cx sent. CT CAP showing cholelithiasis but no other acute findings. CT chest neg for acute findings. I consulted nephrology to send fluid from her PD catheter to r/o peritonitis. Gave broad spectrum abx vanc/cefepime. Tele admit. Amount and/or Complexity of Data Reviewed  Independent Historian: caregiver  Labs: ordered. Decision-making details documented in ED Course. Radiology: ordered and independent interpretation performed. Decision-making details documented in ED Course. ECG/medicine tests: ordered and independent interpretation performed. Decision-making details documented in ED Course. Risk  OTC drugs. Prescription drug management. Decision regarding hospitalization. ED Course            CONSULTS:  IP CONSULT TO NEPHROLOGY  IP CONSULT TO PHARMACY  IP WOUND CARE NURSE CONSULT TO EVAL    PROCEDURES:  Unless otherwise noted below, none     Procedures      FINAL IMPRESSION      1. Elevated troponin    2. Abdominal pain, epigastric    3. Complicated UTI (urinary tract infection)    4. ESRD (end stage renal disease) ([address] [facility])          DISPOSITION/PLAN   DISPOSITION Admitted 10/13/2023 02:28:13 AM      PATIENT REFERRED TO:  No follow-up provider specified.     DISCHARGE MEDICATIONS:  Current Discharge Medication List            Deirdre Cho MD (electronically signed)  Emergency Attending Physician     [facility] for Admission  2:32

## 2023-10-13 NOTE — ED NOTES
9:13 PM  I have evaluated the patient as the Provider in Rapid Medical Evaluation (RME). I have reviewed her vital signs and the triage nurse assessment. I have talked with the patient and any available family and advised that I am the provider in triage and have ordered the appropriate study to initiate their work up based on the clinical presentation during my assessment. I have advised that the patient will be accommodated in the Main ED as soon as possible. I have also requested to contact the triage nurse or myself immediately if the patient experiences any changes in their condition during this brief waiting period. 70 y.o. female presents to ED with daughter who reports that patient has been having chills, lightheadedness, nausea, vomiting. She also notes episode of syncope yesterday when her brother caught her and lowered to the ground. She was recently admitted to this hospital from 09/30-10/06 for UTI, sepsis, hyponatreia, and ESRD. Symptoms started again on Monday.  Patient's daughter reports that patient's NP recommended patient come back in.     [facility], [address] [address] [place] [address] [address], [place]  10/12/23 4762

## 2023-10-13 NOTE — H&P
History & Physical    Primary Care Provider: Kevin Morocho MD  Source of Information: Patient and chart review    History of Presenting Illness:   Davis Joshua is a 70 y.o. female with hx of cad s/p cabg, esrd on PD, chronic recurrent nausea, vomiting and abdominal pain, hfref who presented to ed with complaints of abdominal pain. Patient states she has had constant, aching, generalized abdominal pain for the last week and a half. States symptoms were present at time of discharge from hospital.  She was recently admitted and underwent CABG. She endorses aching sternal pain which she states is unchanged since her surgical procedure. She has had 1 episode of nonbloody, nonbilious emesis. Endorses malaise, fatigue and some anorexia due to her aching abdominal pain. The patient denies any fever, chills, chest pain, cough, congestion, recent illness, palpitations, or dysuria/urgency or frequency. Remarkable vitals on ER Presentation: vss  Labs Remarkable for: lactic acid 2.9, cr 7.71, hgb 7.5, UA: lg bld, wbc, 1+ bact  ER Images: ct chest abd et pelvis: no acute process  ER Rx: asa 324, cefepime, cymbalta     Review of Systems:  Pertinent items are noted in the History of Present Illness.      Past Medical History:   Diagnosis Date    Asthma     CAD (coronary artery disease)     CHF (congestive heart failure) ([facility])     Dr Booth    Chronic kidney disease     [phone] now on PD    COPD (chronic obstructive pulmonary disease) ([facility])     PCP manages    Diabetes ([facility])     DM2    ESRD on peritoneal dialysis ([facility])     Eduardo (Tammie-PD nurse) Lauryn Diss [phone]- now on PD    GERD (gastroesophageal reflux disease)     History of blood transfusion     Hyperlipidemia     Hypertension       Past Surgical History:   Procedure Laterality Date    CARDIAC PROCEDURE N/A 8/23/2023    Left heart cath / coronary angiography performed by Dana Paget, MD at [address] [place] [address] CATH LAB    COLONOSCOPY N/A

## 2023-10-13 NOTE — ED TRIAGE NOTES
Patient arrived via wheelchair w/ c/o nausea, vomiting, syncope episode w/ a assisted fall yesterday, lack of appetite, and diaphoretic since Monday. Patient was just released on Friday from sepsis.

## 2023-10-14 LAB
ANION GAP SERPL CALC-SCNC: 11 MMOL/L (ref 5–15)
BACTERIA SPEC CULT: NORMAL
BASOPHILS # BLD: 0.1 K/UL (ref 0–0.1)
BASOPHILS NFR BLD: 1 % (ref 0–1)
BUN SERPL-MCNC: 33 MG/DL (ref 6–20)
BUN/CREAT SERPL: 4 (ref 12–20)
CALCIUM SERPL-MCNC: 7.9 MG/DL (ref 8.5–10.1)
CHLORIDE SERPL-SCNC: 94 MMOL/L (ref 97–108)
CO2 SERPL-SCNC: 21 MMOL/L (ref 21–32)
CREAT SERPL-MCNC: 7.85 MG/DL (ref 0.55–1.02)
DIFFERENTIAL METHOD BLD: ABNORMAL
EOSINOPHIL # BLD: 0.2 K/UL (ref 0–0.4)
EOSINOPHIL NFR BLD: 2 % (ref 0–7)
ERYTHROCYTE [DISTWIDTH] IN BLOOD BY AUTOMATED COUNT: 17.2 % (ref 11.5–14.5)
GLUCOSE BLD STRIP.AUTO-MCNC: 170 MG/DL (ref 65–117)
GLUCOSE SERPL-MCNC: 148 MG/DL (ref 65–100)
HCT VFR BLD AUTO: 27.8 % (ref 35–47)
HGB BLD-MCNC: 8.3 G/DL (ref 11.5–16)
IMM GRANULOCYTES # BLD AUTO: 0 K/UL (ref 0–0.04)
IMM GRANULOCYTES NFR BLD AUTO: 1 % (ref 0–0.5)
LYMPHOCYTES # BLD: 1.4 K/UL (ref 0.8–3.5)
LYMPHOCYTES NFR BLD: 16 % (ref 12–49)
MCH RBC QN AUTO: 28.2 PG (ref 26–34)
MCHC RBC AUTO-ENTMCNC: 29.9 G/DL (ref 30–36.5)
MCV RBC AUTO: 94.6 FL (ref 80–99)
MONOCYTES # BLD: 0.5 K/UL (ref 0–1)
MONOCYTES NFR BLD: 5 % (ref 5–13)
NEUTS SEG # BLD: 6.3 K/UL (ref 1.8–8)
NEUTS SEG NFR BLD: 75 % (ref 32–75)
NRBC # BLD: 0 K/UL (ref 0–0.01)
NRBC BLD-RTO: 0 PER 100 WBC
PLATELET # BLD AUTO: 228 K/UL (ref 150–400)
PMV BLD AUTO: 9.6 FL (ref 8.9–12.9)
POTASSIUM SERPL-SCNC: 3.8 MMOL/L (ref 3.5–5.1)
RBC # BLD AUTO: 2.94 M/UL (ref 3.8–5.2)
SERVICE CMNT-IMP: ABNORMAL
SERVICE CMNT-IMP: NORMAL
SERVICE CMNT-IMP: NORMAL
SODIUM SERPL-SCNC: 126 MMOL/L (ref 136–145)
WBC # BLD AUTO: 8.4 K/UL (ref 3.6–11)

## 2023-10-14 PROCEDURE — 2580000003 HC RX 258: Performed by: INTERNAL MEDICINE

## 2023-10-14 PROCEDURE — 6360000002 HC RX W HCPCS: Performed by: HOSPITALIST

## 2023-10-14 PROCEDURE — 36415 COLL VENOUS BLD VENIPUNCTURE: CPT

## 2023-10-14 PROCEDURE — 6370000000 HC RX 637 (ALT 250 FOR IP): Performed by: FAMILY MEDICINE

## 2023-10-14 PROCEDURE — 85025 COMPLETE CBC W/AUTO DIFF WBC: CPT

## 2023-10-14 PROCEDURE — 2580000003 HC RX 258: Performed by: FAMILY MEDICINE

## 2023-10-14 PROCEDURE — 80048 BASIC METABOLIC PNL TOTAL CA: CPT

## 2023-10-14 PROCEDURE — 6360000002 HC RX W HCPCS: Performed by: FAMILY MEDICINE

## 2023-10-14 PROCEDURE — 6370000000 HC RX 637 (ALT 250 FOR IP): Performed by: INTERNAL MEDICINE

## 2023-10-14 PROCEDURE — 82962 GLUCOSE BLOOD TEST: CPT

## 2023-10-14 PROCEDURE — 2060000000 HC ICU INTERMEDIATE R&B

## 2023-10-14 RX ORDER — METOCLOPRAMIDE HYDROCHLORIDE 5 MG/ML
5 INJECTION INTRAMUSCULAR; INTRAVENOUS
Status: DISCONTINUED | OUTPATIENT
Start: 2023-10-14 | End: 2023-10-19

## 2023-10-14 RX ORDER — DEXTROSE MONOHYDRATE 100 MG/ML
INJECTION, SOLUTION INTRAVENOUS CONTINUOUS PRN
Status: DISCONTINUED | OUTPATIENT
Start: 2023-10-14 | End: 2023-10-21 | Stop reason: HOSPADM

## 2023-10-14 RX ORDER — INSULIN LISPRO 100 [IU]/ML
0-4 INJECTION, SOLUTION INTRAVENOUS; SUBCUTANEOUS NIGHTLY
Status: DISCONTINUED | OUTPATIENT
Start: 2023-10-14 | End: 2023-10-21 | Stop reason: HOSPADM

## 2023-10-14 RX ORDER — INSULIN LISPRO 100 [IU]/ML
0-8 INJECTION, SOLUTION INTRAVENOUS; SUBCUTANEOUS
Status: DISCONTINUED | OUTPATIENT
Start: 2023-10-15 | End: 2023-10-21 | Stop reason: HOSPADM

## 2023-10-14 RX ADMIN — SODIUM CHLORIDE, PRESERVATIVE FREE 10 ML: 5 INJECTION INTRAVENOUS at 20:26

## 2023-10-14 RX ADMIN — Medication: at 20:26

## 2023-10-14 RX ADMIN — MONTELUKAST 10 MG: 10 TABLET, FILM COATED ORAL at 20:23

## 2023-10-14 RX ADMIN — CEFEPIME 1000 MG: 1 INJECTION, POWDER, FOR SOLUTION INTRAMUSCULAR; INTRAVENOUS at 00:48

## 2023-10-14 RX ADMIN — GENTAMICIN SULFATE: 1 CREAM TOPICAL at 18:56

## 2023-10-14 RX ADMIN — SODIUM CHLORIDE, SODIUM LACTATE, CALCIUM CHLORIDE, MAGNESIUM CHLORIDE AND DEXTROSE 6000 ML: 1.5; 538; 448; 18.3; 5.08 INJECTION, SOLUTION INTRAPERITONEAL at 18:56

## 2023-10-14 RX ADMIN — Medication: at 11:37

## 2023-10-14 RX ADMIN — OXYCODONE HYDROCHLORIDE 5 MG: 5 TABLET ORAL at 20:23

## 2023-10-14 RX ADMIN — METOCLOPRAMIDE 5 MG: 5 INJECTION, SOLUTION INTRAMUSCULAR; INTRAVENOUS at 17:14

## 2023-10-14 RX ADMIN — PANTOPRAZOLE SODIUM 40 MG: 40 TABLET, DELAYED RELEASE ORAL at 11:36

## 2023-10-14 RX ADMIN — DULOXETINE HYDROCHLORIDE 20 MG: 20 CAPSULE, DELAYED RELEASE ORAL at 20:23

## 2023-10-14 RX ADMIN — GENTAMICIN SULFATE: 1 CREAM TOPICAL at 09:15

## 2023-10-14 RX ADMIN — ROSUVASTATIN CALCIUM 40 MG: 10 TABLET, FILM COATED ORAL at 11:37

## 2023-10-14 RX ADMIN — METOCLOPRAMIDE 5 MG: 5 INJECTION, SOLUTION INTRAMUSCULAR; INTRAVENOUS at 11:37

## 2023-10-14 RX ADMIN — DULOXETINE HYDROCHLORIDE 20 MG: 20 CAPSULE, DELAYED RELEASE ORAL at 11:43

## 2023-10-14 RX ADMIN — POTASSIUM BICARBONATE 40 MEQ: 782 TABLET, EFFERVESCENT ORAL at 11:36

## 2023-10-14 RX ADMIN — SODIUM CHLORIDE, SODIUM LACTATE, CALCIUM CHLORIDE, MAGNESIUM CHLORIDE AND DEXTROSE 6000 ML: 1.5; 538; 448; 18.3; 5.08 INJECTION, SOLUTION INTRAPERITONEAL at 18:57

## 2023-10-14 RX ADMIN — OXYCODONE HYDROCHLORIDE 5 MG: 5 TABLET ORAL at 11:43

## 2023-10-14 RX ADMIN — Medication 1 MG: at 11:36

## 2023-10-14 RX ADMIN — INSULIN GLARGINE 20 UNITS: 100 INJECTION, SOLUTION SUBCUTANEOUS at 20:24

## 2023-10-14 RX ADMIN — SODIUM CHLORIDE, PRESERVATIVE FREE 10 ML: 5 INJECTION INTRAVENOUS at 11:38

## 2023-10-14 ASSESSMENT — PAIN DESCRIPTION - LOCATION
LOCATION: ABDOMEN;CHEST
LOCATION: OTHER (COMMENT)

## 2023-10-14 ASSESSMENT — PAIN SCALES - GENERAL
PAINLEVEL_OUTOF10: 7
PAINLEVEL_OUTOF10: 8

## 2023-10-14 ASSESSMENT — PAIN DESCRIPTION - ORIENTATION: ORIENTATION: LEFT

## 2023-10-14 ASSESSMENT — PAIN DESCRIPTION - DESCRIPTORS: DESCRIPTORS: OTHER (COMMENT)

## 2023-10-14 NOTE — PROGRESS NOTES
[facility]   [address], [address] [facility], [address] [facility]  Phone: [phone]   [phone]    [email or web address]     Nephrology Progress Note    Patient Name : Sunshine Campbell      : 1955     MRN : [id number]  Date of Admission : 10/12/2023  Date of Servive : 10/14/23    CC: Follow up for ESRD       Assessment and Plan   ESRD on PD:  -CCPD nightly at home, patient of Dr. Chiara Sullivan clinic  -Home Rx: 5X 2300 mL, 9.5-hour treatment time, 1000 mL icodextrin last fill  -Continue with current CCPD orders    Abd pain:  -No evidence of peritonitis     Anemia of CKD:  - LIS MWF    Hypokalemia:  - repletion ordered     Hypertension:  - hold BP meds for now    CAD s/p CABG 23     DM  COPD  HLD     Interval History:  Patient seen and examined earlier this morning. She was getting disconnected with from PD. Having decent ultrafiltration. PD fluid cell count was normal.  She denies any abdominal pain. P.o. intake remains poor. With nursing staff  Review of Systems: Pertinent items are noted in HPI.     Current Medications:   Current Facility-Administered Medications   Medication Dose Route Frequency    metoclopramide (REGLAN) injection 5 mg  5 mg IntraVENous TID AC    sodium chloride flush 0.9 % injection 5-40 mL  5-40 mL IntraVENous 2 times per day    sodium chloride flush 0.9 % injection 5-40 mL  5-40 mL IntraVENous PRN    0.9 % sodium chloride infusion   IntraVENous PRN    acetaminophen (TYLENOL) tablet 650 mg  650 mg Oral Q6H PRN    Or    acetaminophen (TYLENOL) suppository 650 mg  650 mg Rectal Q6H PRN    albuterol sulfate HFA (PROVENTIL;VENTOLIN;PROAIR) 108 (90 Base) MCG/ACT inhaler 2 puff  2 puff Inhalation Q6H PRN    DULoxetine (CYMBALTA) extended release capsule 20 mg  20 mg Oral BID    folic acid (FOLVITE) tablet 1 mg  1 mg Oral Daily    insulin glargine (LANTUS) injection vial 20 Units  20 Units SubCUTAneous Nightly    ipratropium 0.5 mg-albuterol 2.5 mg HS megaly, PD exit site clean  Neurologic:  Non focal  Psych:             AAO x 3 appropriate affect   Skin:  No Rash      []    High complexity decision making was performed  []    Patient is at high-risk of decompensation with multiple organ involvement    Lab Data Personally Reviewed: I have reviewed all the pertinent labs, microbiology data and radiology studies during assessment. Labs:  Recent Labs     10/13/23  0335 10/13/23  0818 10/14/23  0638   * 133* 126*   K 3.4* 3.1* 3.8   CL 98 95* 94*   CO2 25 28 21   GLUCOSE 132* 109* 148*   BUN 34* 34* 33*   CREATININE 7.71* 8.06* 7.85*   CALCIUM 7.8* 8.1* 7.9*         Recent Labs     10/12/23  2130 10/13/23  0335 10/14/23  0644   WBC 11.1* 9.2 8.4   RBC 3.19* 2.62* 2.94*   HGB 9.1* 7.5* 8.3*   HCT 30.4* 24.7* 27.8*   MCV 95.3 94.3 94.6   MCH 28.5 28.6 28.2   MCHC 29.9* 30.4 29.9*   RDW 17.5* 17.3* 17.2*    250 228   MPV 10.4 9.8 9.6       Recent Labs     10/12/23  2130 10/13/23  0335   GLOB 6.5* 5.0*       Recent Labs     10/13/23  0335   INR 1.1   APTT 28.3        No results for input(s): \"CPK\", \"CKMB\", \"TROPONINI\" in the last 72 hours. Invalid input(s): \"B-NP\"  Invalid input(s): \"PHI\", \"PCO2I\", \"PO2I\", \"FIO2I\"     Ventilator:       Microbiology:  No results found for: \"SDES\"  No components found for: \"CULT\"      I have reviewed the flowsheets. Chart and Pertinent Notes have been reviewed. No change in PMH ,family and social history from Consult note.       Conchis Sal MD  [facility]

## 2023-10-14 NOTE — PLAN OF CARE
Problem: Safety - Adult  Goal: Free from fall injury  Outcome: Progressing  Flowsheets (Taken 10/14/2023 0437)  Free From Fall Injury: Instruct family/caregiver on patient safety     Problem: Chronic Conditions and Co-morbidities  Goal: Patient's chronic conditions and co-morbidity symptoms are monitored and maintained or improved  Outcome: Progressing  Flowsheets (Taken 10/13/2023 2002)  Care Plan - Patient's Chronic Conditions and Co-Morbidity Symptoms are Monitored and Maintained or Improved: Monitor and assess patient's chronic conditions and comorbid symptoms for stability, deterioration, or improvement

## 2023-10-14 NOTE — PROGRESS NOTES
Hospitalist Progress Note  Chad Melgar MD  Answering service: [phone]        Date of Service:  10/14/2023  NAME:  Nubia Espinoza  :  1955  MRN:  [id number]      Admission Summary:   Nubia Espinoza is a 70 y.o. female with hx of cad s/p cabg, esrd on PD, chronic recurrent nausea, vomiting and abdominal pain, hfref who presented to ed with complaints of abdominal pain. Patient states she has had constant, aching, generalized abdominal pain for the last week and a half. States symptoms were present at time of discharge from hospital.  She was recently admitted and underwent CABG. She endorses aching sternal pain which she states is unchanged since her surgical procedure. She has had 1 episode of nonbloody, nonbilious emesis. Endorses malaise, fatigue and some anorexia due to her aching abdominal pain. Patient was just admitted from  to 10/6 for syncopal, UTI ,hyponatremia and similar abd pain, vomiting, nausea. The patient denies any fever, chills, chest pain, cough, congestion, recent illness, palpitations, or dysuria/urgency or frequency. Remarkable vitals on ER Presentation: vss  Labs Remarkable for: lactic acid 2.9, cr 7.71, hgb 7.5, UA: lg bld, wbc, 1+ bact  ER Images: ct chest abd et pelvis: no acute process  ER Rx: asa 324, cefepime, cymbalta   CT ABDOMEN PELVIS WO CONTRAST Additional Contrast? None    Result Date: 10/13/2023  1. No acute pathology in the chest, abdomen, or pelvis. 2.  Small pulmonary nodules. Consider follow-up chest CT in one year to patient's history of smoking. 3.  Peritoneal catheter in place with free fluid in the abdomen and pelvis. 4.  Small stones or sludge in gallbladder. CT CHEST WO CONTRAST    Result Date: 10/13/2023  1. No acute pathology in the chest, abdomen, or pelvis. 2.  Small pulmonary nodules.  Consider follow-up chest CT in one year to patient's history

## 2023-10-14 NOTE — PROGRESS NOTES
Bedside shift change report given to [id number] [facility] Coden Lyons (oncoming nurse) by Chente Singleton (offgoing nurse). Report included the following information Nurse Handoff Report.

## 2023-10-15 ENCOUNTER — APPOINTMENT (OUTPATIENT)
Facility: HOSPITAL | Age: 68
DRG: 073 | End: 2023-10-15
Payer: MEDICARE

## 2023-10-15 LAB
ANION GAP SERPL CALC-SCNC: 9 MMOL/L (ref 5–15)
BUN SERPL-MCNC: 35 MG/DL (ref 6–20)
BUN/CREAT SERPL: 5 (ref 12–20)
CALCIUM SERPL-MCNC: 7.9 MG/DL (ref 8.5–10.1)
CHLORIDE SERPL-SCNC: 94 MMOL/L (ref 97–108)
CO2 SERPL-SCNC: 25 MMOL/L (ref 21–32)
CREAT SERPL-MCNC: 7.75 MG/DL (ref 0.55–1.02)
GLUCOSE BLD STRIP.AUTO-MCNC: 150 MG/DL (ref 65–117)
GLUCOSE BLD STRIP.AUTO-MCNC: 197 MG/DL (ref 65–117)
GLUCOSE BLD STRIP.AUTO-MCNC: 210 MG/DL (ref 65–117)
GLUCOSE SERPL-MCNC: 174 MG/DL (ref 65–100)
POTASSIUM SERPL-SCNC: 3.9 MMOL/L (ref 3.5–5.1)
SERVICE CMNT-IMP: ABNORMAL
SODIUM SERPL-SCNC: 128 MMOL/L (ref 136–145)

## 2023-10-15 PROCEDURE — 6370000000 HC RX 637 (ALT 250 FOR IP): Performed by: FAMILY MEDICINE

## 2023-10-15 PROCEDURE — 6360000002 HC RX W HCPCS: Performed by: HOSPITALIST

## 2023-10-15 PROCEDURE — 90945 DIALYSIS ONE EVALUATION: CPT

## 2023-10-15 PROCEDURE — 2060000000 HC ICU INTERMEDIATE R&B

## 2023-10-15 PROCEDURE — 82962 GLUCOSE BLOOD TEST: CPT

## 2023-10-15 PROCEDURE — 6370000000 HC RX 637 (ALT 250 FOR IP): Performed by: INTERNAL MEDICINE

## 2023-10-15 PROCEDURE — 2580000003 HC RX 258: Performed by: INTERNAL MEDICINE

## 2023-10-15 PROCEDURE — 80048 BASIC METABOLIC PNL TOTAL CA: CPT

## 2023-10-15 PROCEDURE — 2580000003 HC RX 258: Performed by: FAMILY MEDICINE

## 2023-10-15 PROCEDURE — 36415 COLL VENOUS BLD VENIPUNCTURE: CPT

## 2023-10-15 PROCEDURE — 71045 X-RAY EXAM CHEST 1 VIEW: CPT

## 2023-10-15 PROCEDURE — 6370000000 HC RX 637 (ALT 250 FOR IP): Performed by: NURSE PRACTITIONER

## 2023-10-15 PROCEDURE — 6360000002 HC RX W HCPCS: Performed by: FAMILY MEDICINE

## 2023-10-15 RX ORDER — ALBUTEROL SULFATE 90 UG/1
2 AEROSOL, METERED RESPIRATORY (INHALATION) EVERY 6 HOURS PRN
Status: DISCONTINUED | OUTPATIENT
Start: 2023-10-15 | End: 2023-10-21 | Stop reason: HOSPADM

## 2023-10-15 RX ORDER — SODIUM CHLORIDE 9 MG/ML
INJECTION, SOLUTION INTRAVENOUS CONTINUOUS
Status: DISCONTINUED | OUTPATIENT
Start: 2023-10-15 | End: 2023-10-15

## 2023-10-15 RX ADMIN — SODIUM CHLORIDE, SODIUM LACTATE, CALCIUM CHLORIDE, MAGNESIUM CHLORIDE AND DEXTROSE 6000 ML: 1.5; 538; 448; 18.3; 5.08 INJECTION, SOLUTION INTRAPERITONEAL at 17:03

## 2023-10-15 RX ADMIN — DULOXETINE HYDROCHLORIDE 20 MG: 20 CAPSULE, DELAYED RELEASE ORAL at 09:15

## 2023-10-15 RX ADMIN — METHOCARBAMOL 500 MG: 500 TABLET ORAL at 20:26

## 2023-10-15 RX ADMIN — Medication: at 20:38

## 2023-10-15 RX ADMIN — SODIUM CHLORIDE, PRESERVATIVE FREE 10 ML: 5 INJECTION INTRAVENOUS at 20:28

## 2023-10-15 RX ADMIN — PANTOPRAZOLE SODIUM 40 MG: 40 TABLET, DELAYED RELEASE ORAL at 09:15

## 2023-10-15 RX ADMIN — MONTELUKAST 10 MG: 10 TABLET, FILM COATED ORAL at 20:25

## 2023-10-15 RX ADMIN — DULOXETINE HYDROCHLORIDE 20 MG: 20 CAPSULE, DELAYED RELEASE ORAL at 20:25

## 2023-10-15 RX ADMIN — POTASSIUM BICARBONATE 40 MEQ: 782 TABLET, EFFERVESCENT ORAL at 09:15

## 2023-10-15 RX ADMIN — Medication 1 MG: at 09:15

## 2023-10-15 RX ADMIN — GENTAMICIN SULFATE: 1 CREAM TOPICAL at 17:02

## 2023-10-15 RX ADMIN — SODIUM CHLORIDE: 9 INJECTION, SOLUTION INTRAVENOUS at 10:16

## 2023-10-15 RX ADMIN — Medication: at 09:22

## 2023-10-15 RX ADMIN — METOCLOPRAMIDE 5 MG: 5 INJECTION, SOLUTION INTRAMUSCULAR; INTRAVENOUS at 08:09

## 2023-10-15 RX ADMIN — ROSUVASTATIN CALCIUM 40 MG: 10 TABLET, FILM COATED ORAL at 09:15

## 2023-10-15 RX ADMIN — METOCLOPRAMIDE 5 MG: 5 INJECTION, SOLUTION INTRAMUSCULAR; INTRAVENOUS at 18:48

## 2023-10-15 RX ADMIN — INSULIN GLARGINE 20 UNITS: 100 INJECTION, SOLUTION SUBCUTANEOUS at 20:27

## 2023-10-15 RX ADMIN — METOCLOPRAMIDE 5 MG: 5 INJECTION, SOLUTION INTRAMUSCULAR; INTRAVENOUS at 13:58

## 2023-10-15 RX ADMIN — CEFEPIME 1000 MG: 1 INJECTION, POWDER, FOR SOLUTION INTRAMUSCULAR; INTRAVENOUS at 01:02

## 2023-10-15 RX ADMIN — Medication 2 UNITS: at 18:47

## 2023-10-15 ASSESSMENT — PAIN DESCRIPTION - LOCATION
LOCATION: BACK;CHEST
LOCATION: BACK;CHEST

## 2023-10-15 ASSESSMENT — PAIN DESCRIPTION - FREQUENCY
FREQUENCY: CONTINUOUS
FREQUENCY: CONTINUOUS

## 2023-10-15 ASSESSMENT — PAIN DESCRIPTION - DESCRIPTORS: DESCRIPTORS: OTHER (COMMENT)

## 2023-10-15 ASSESSMENT — PAIN DESCRIPTION - ONSET
ONSET: ON-GOING
ONSET: ON-GOING

## 2023-10-15 ASSESSMENT — PAIN SCALES - GENERAL
PAINLEVEL_OUTOF10: 7
PAINLEVEL_OUTOF10: 7

## 2023-10-15 ASSESSMENT — PAIN DESCRIPTION - PAIN TYPE
TYPE: CHRONIC PAIN
TYPE: CHRONIC PAIN

## 2023-10-15 NOTE — PLAN OF CARE
Problem: Discharge Planning  Goal: Discharge to home or other facility with appropriate resources  Outcome: Progressing  Flowsheets (Taken 10/14/2023 2003)  Discharge to home or other facility with appropriate resources: Identify barriers to discharge with patient and caregiver     Problem: Safety - Adult  Goal: Free from fall injury  Outcome: Progressing  Flowsheets (Taken 10/15/2023 0341)  Free From Fall Injury: Instruct family/caregiver on patient safety     Problem: Chronic Conditions and Co-morbidities  Goal: Patient's chronic conditions and co-morbidity symptoms are monitored and maintained or improved  Outcome: Progressing  Flowsheets (Taken 10/14/2023 2003)  Care Plan - Patient's Chronic Conditions and Co-Morbidity Symptoms are Monitored and Maintained or Improved: Monitor and assess patient's chronic conditions and comorbid symptoms for stability, deterioration, or improvement     Problem: Skin/Tissue Integrity  Goal: Absence of new skin breakdown  Description: 1. Monitor for areas of redness and/or skin breakdown  2. Assess vascular access sites hourly  3. Every 4-6 hours minimum:  Change oxygen saturation probe site  4. Every 4-6 hours:  If on nasal continuous positive airway pressure, respiratory therapy assess nares and determine need for appliance change or resting period.   Outcome: Progressing     Problem: Pain  Goal: Verbalizes/displays adequate comfort level or baseline comfort level  Outcome: Progressing

## 2023-10-15 NOTE — PROGRESS NOTES
Hospitalist Progress Note  Ata Barfield MD  Answering service: [phone]        Date of Service:  10/15/2023  NAME:  Urvashi Orozco  :  1955  MRN:  [id number]      Admission Summary:   Urvashi Orozco is a 70 y.o. female with hx of cad s/p cabg, esrd on PD, chronic recurrent nausea, vomiting and abdominal pain, hfref who presented to ed with complaints of abdominal pain. Patient states she has had constant, aching, generalized abdominal pain for the last week and a half. States symptoms were present at time of discharge from hospital.  She was recently admitted and underwent CABG. She endorses aching sternal pain which she states is unchanged since her surgical procedure. She has had 1 episode of nonbloody, nonbilious emesis. Endorses malaise, fatigue and some anorexia due to her aching abdominal pain. Patient was just admitted from  to 10/6 for syncopal, UTI ,hyponatremia and similar abd pain, vomiting, nausea. The patient denies any fever, chills, chest pain, cough, congestion, recent illness, palpitations, or dysuria/urgency or frequency. Remarkable vitals on ER Presentation: vss  Labs Remarkable for: lactic acid 2.9, cr 7.71, hgb 7.5, UA: lg bld, wbc, 1+ bact  ER Images: ct chest abd et pelvis: no acute process  ER Rx: asa 324, cefepime, cymbalta   CT ABDOMEN PELVIS WO CONTRAST Additional Contrast? None    Result Date: 10/13/2023  1. No acute pathology in the chest, abdomen, or pelvis. 2.  Small pulmonary nodules. Consider follow-up chest CT in one year to patient's history of smoking. 3.  Peritoneal catheter in place with free fluid in the abdomen and pelvis. 4.  Small stones or sludge in gallbladder. CT CHEST WO CONTRAST    Result Date: 10/13/2023  1. No acute pathology in the chest, abdomen, or pelvis. 2.  Small pulmonary nodules.  Consider follow-up chest CT in one year to patient's history

## 2023-10-15 NOTE — FLOWSHEET NOTE
CCPD Disconnect: 10/15/23 0840   Peritoneal Dialysis Catheter Mid lower abdomen   No placement date or time found. Catheter Location: Mid lower abdomen   Status Deaccessed   Dressing Status Clean, dry & intact   Dressing Gauze   Date of Last Dressing Change 10/14/23   Dialysis Type Continuous cycling   Exit Site Condition Good   Catheter Care Given Yes  (Two minute Alcavis scrub/soak performed prior to connection.)   Post-Treatment (Cycler)   Average Dwell Time (Hours:Minutes) 1:09   Lost Dwell Time (Hours:Minutes) 1:49   Effluent Appearance Clear;Yellow   I Drain (mL) 0 mL   PD Output (mL) -364 mL  (i drain 0 + total UF -364)   Volume Gain (mL) 364 mL     Primary RN SBAR: Rober Lovett RN  Comments: LOW UF alarms overnight, patient repositioned and last drain resumed several times. Bypass to end treatment. Old cassette and bags discarded in red bio bag. Call bell and belongings within reach.

## 2023-10-15 NOTE — FLOWSHEET NOTE
10/13/23 2130   Peritoneal Dialysis Catheter Mid lower abdomen   No placement date or time found. Catheter Location: Mid lower abdomen   Status Accessed   Site Condition Clean, dry, intact   Dressing Status New dressing applied   Dressing Gauze  (Exit site cleansed with Exsept and Gentamicin 1% ointment applied.)   Date of Last Dressing Change 10/13/23   Dialysis Type Continuous cycling   Exit Site Condition Good   Catheter Care Given Yes  (Two minute Alcavis scrub/soak prior to connection.)   Cycler   Verification of Prescription CCPD   Informed Consent  Yes  (Chronic consent applies.)   Total Volume Programmed 00558 mL   Cycler Type [facility]   Fill Volume 2300 mL   Last Fill Volume 0 mL   Dextrose Setting Same (Nonextraneal)   I Drain Alarm 0 mL   Number of Cycles 5   Bag Volume 6000 mL   Number of Bags Used 2   Dianeal Solution Other (Comment)  (1.5% in 6000 ml)     Primary RN SBAR: Francoise Bowen RN  Patient Education: Exit site care    Comments:  Arrived to initiate CCPD. New cassette primed, tested and connected with aseptic technique after catheter disinfection. Remained with the patient for the initial drain and beginning of the first fill. Patient left with bed low, side rails up X 4, call bell and belongings in reach.         Hepatitis B Surface Ag   Date/Time Value Ref Range Status   10/01/2023 01:39 AM <0.10 Index Final     Hep B S Ab   Date/Time Value Ref Range Status   10/05/2023 12:41 AM <3.10 mIU/mL Final

## 2023-10-16 ENCOUNTER — APPOINTMENT (OUTPATIENT)
Facility: HOSPITAL | Age: 68
DRG: 073 | End: 2023-10-16
Payer: MEDICARE

## 2023-10-16 ENCOUNTER — HOME CARE VISIT (OUTPATIENT)
Dept: HOME HEALTH SERVICES | Facility: HOME HEALTH | Age: 68
End: 2023-10-16
Payer: MEDICARE

## 2023-10-16 LAB
ALBUMIN SERPL-MCNC: 1.4 G/DL (ref 3.5–5)
ALBUMIN/GLOB SERPL: 0.3 (ref 1.1–2.2)
ALP SERPL-CCNC: 198 U/L (ref 45–117)
ALT SERPL-CCNC: 12 U/L (ref 12–78)
ANION GAP SERPL CALC-SCNC: 10 MMOL/L (ref 5–15)
AST SERPL-CCNC: 26 U/L (ref 15–37)
BILIRUB SERPL-MCNC: 0.4 MG/DL (ref 0.2–1)
BUN SERPL-MCNC: 32 MG/DL (ref 6–20)
BUN/CREAT SERPL: 5 (ref 12–20)
CALCIUM SERPL-MCNC: 8 MG/DL (ref 8.5–10.1)
CHLORIDE SERPL-SCNC: 97 MMOL/L (ref 97–108)
CO2 SERPL-SCNC: 25 MMOL/L (ref 21–32)
CREAT SERPL-MCNC: 7.02 MG/DL (ref 0.55–1.02)
GLOBULIN SER CALC-MCNC: 5.1 G/DL (ref 2–4)
GLUCOSE BLD STRIP.AUTO-MCNC: 117 MG/DL (ref 65–117)
GLUCOSE BLD STRIP.AUTO-MCNC: 55 MG/DL (ref 65–117)
GLUCOSE BLD STRIP.AUTO-MCNC: 57 MG/DL (ref 65–117)
GLUCOSE BLD STRIP.AUTO-MCNC: 58 MG/DL (ref 65–117)
GLUCOSE BLD STRIP.AUTO-MCNC: 70 MG/DL (ref 65–117)
GLUCOSE BLD STRIP.AUTO-MCNC: 71 MG/DL (ref 65–117)
GLUCOSE BLD STRIP.AUTO-MCNC: 83 MG/DL (ref 65–117)
GLUCOSE SERPL-MCNC: 138 MG/DL (ref 65–100)
MAGNESIUM SERPL-MCNC: 2.4 MG/DL (ref 1.6–2.4)
PHOSPHATE SERPL-MCNC: 3.4 MG/DL (ref 2.6–4.7)
POTASSIUM SERPL-SCNC: 3.8 MMOL/L (ref 3.5–5.1)
PREALB SERPL-MCNC: 15.4 MG/DL (ref 20–40)
PROT SERPL-MCNC: 6.5 G/DL (ref 6.4–8.2)
SERVICE CMNT-IMP: ABNORMAL
SERVICE CMNT-IMP: NORMAL
SODIUM SERPL-SCNC: 132 MMOL/L (ref 136–145)

## 2023-10-16 PROCEDURE — 2580000003 HC RX 258: Performed by: NURSE PRACTITIONER

## 2023-10-16 PROCEDURE — 6370000000 HC RX 637 (ALT 250 FOR IP): Performed by: FAMILY MEDICINE

## 2023-10-16 PROCEDURE — 80053 COMPREHEN METABOLIC PANEL: CPT

## 2023-10-16 PROCEDURE — 78227 HEPATOBIL SYST IMAGE W/DRUG: CPT

## 2023-10-16 PROCEDURE — 6360000002 HC RX W HCPCS: Performed by: FAMILY MEDICINE

## 2023-10-16 PROCEDURE — 84100 ASSAY OF PHOSPHORUS: CPT

## 2023-10-16 PROCEDURE — 94760 N-INVAS EAR/PLS OXIMETRY 1: CPT

## 2023-10-16 PROCEDURE — 3430000000 HC RX DIAGNOSTIC RADIOPHARMACEUTICAL: Performed by: NURSE PRACTITIONER

## 2023-10-16 PROCEDURE — A9537 TC99M MEBROFENIN: HCPCS | Performed by: NURSE PRACTITIONER

## 2023-10-16 PROCEDURE — 6360000004 HC RX CONTRAST MEDICATION: Performed by: NURSE PRACTITIONER

## 2023-10-16 PROCEDURE — P9047 ALBUMIN (HUMAN), 25%, 50ML: HCPCS | Performed by: INTERNAL MEDICINE

## 2023-10-16 PROCEDURE — 2580000003 HC RX 258: Performed by: FAMILY MEDICINE

## 2023-10-16 PROCEDURE — 36415 COLL VENOUS BLD VENIPUNCTURE: CPT

## 2023-10-16 PROCEDURE — 6360000002 HC RX W HCPCS: Performed by: INTERNAL MEDICINE

## 2023-10-16 PROCEDURE — 82962 GLUCOSE BLOOD TEST: CPT

## 2023-10-16 PROCEDURE — 2060000000 HC ICU INTERMEDIATE R&B

## 2023-10-16 PROCEDURE — 6360000002 HC RX W HCPCS: Performed by: HOSPITALIST

## 2023-10-16 PROCEDURE — 83735 ASSAY OF MAGNESIUM: CPT

## 2023-10-16 PROCEDURE — 6370000000 HC RX 637 (ALT 250 FOR IP): Performed by: HOSPITALIST

## 2023-10-16 PROCEDURE — 6370000000 HC RX 637 (ALT 250 FOR IP): Performed by: INTERNAL MEDICINE

## 2023-10-16 PROCEDURE — 2580000003 HC RX 258: Performed by: INTERNAL MEDICINE

## 2023-10-16 PROCEDURE — 84134 ASSAY OF PREALBUMIN: CPT

## 2023-10-16 RX ORDER — SODIUM CHLORIDE AND POTASSIUM CHLORIDE 300; 900 MG/100ML; MG/100ML
INJECTION, SOLUTION INTRAVENOUS CONTINUOUS
Status: DISCONTINUED | OUTPATIENT
Start: 2023-10-16 | End: 2023-10-17

## 2023-10-16 RX ORDER — PANTOPRAZOLE SODIUM 40 MG/1
40 TABLET, DELAYED RELEASE ORAL
Status: DISCONTINUED | OUTPATIENT
Start: 2023-10-16 | End: 2023-10-21 | Stop reason: HOSPADM

## 2023-10-16 RX ORDER — SODIUM CHLORIDE 9 MG/ML
INJECTION, SOLUTION INTRAVENOUS CONTINUOUS
Status: DISCONTINUED | OUTPATIENT
Start: 2023-10-16 | End: 2023-10-17

## 2023-10-16 RX ORDER — SINCALIDE 5 UG/5ML
0.02 INJECTION, POWDER, LYOPHILIZED, FOR SOLUTION INTRAVENOUS ONCE
Status: COMPLETED | OUTPATIENT
Start: 2023-10-16 | End: 2023-10-16

## 2023-10-16 RX ORDER — ALBUMIN (HUMAN) 12.5 G/50ML
25 SOLUTION INTRAVENOUS EVERY 6 HOURS
Status: COMPLETED | OUTPATIENT
Start: 2023-10-16 | End: 2023-10-16

## 2023-10-16 RX ORDER — KIT FOR THE PREPARATION OF TECHNETIUM TC 99M MEBROFENIN 45 MG/10ML
5 INJECTION, POWDER, LYOPHILIZED, FOR SOLUTION INTRAVENOUS ONCE
Status: COMPLETED | OUTPATIENT
Start: 2023-10-16 | End: 2023-10-16

## 2023-10-16 RX ADMIN — DULOXETINE HYDROCHLORIDE 20 MG: 20 CAPSULE, DELAYED RELEASE ORAL at 08:26

## 2023-10-16 RX ADMIN — DULOXETINE HYDROCHLORIDE 20 MG: 20 CAPSULE, DELAYED RELEASE ORAL at 22:30

## 2023-10-16 RX ADMIN — Medication: at 08:26

## 2023-10-16 RX ADMIN — METOCLOPRAMIDE 5 MG: 5 INJECTION, SOLUTION INTRAMUSCULAR; INTRAVENOUS at 07:31

## 2023-10-16 RX ADMIN — SODIUM CHLORIDE, SODIUM LACTATE, CALCIUM CHLORIDE, MAGNESIUM CHLORIDE AND DEXTROSE 6000 ML: 1.5; 538; 448; 18.3; 5.08 INJECTION, SOLUTION INTRAPERITONEAL at 17:35

## 2023-10-16 RX ADMIN — PANTOPRAZOLE SODIUM 40 MG: 40 TABLET, DELAYED RELEASE ORAL at 08:26

## 2023-10-16 RX ADMIN — SODIUM CHLORIDE 25 ML: 9 INJECTION, SOLUTION INTRAVENOUS at 14:17

## 2023-10-16 RX ADMIN — METOCLOPRAMIDE 5 MG: 5 INJECTION, SOLUTION INTRAMUSCULAR; INTRAVENOUS at 15:23

## 2023-10-16 RX ADMIN — PANTOPRAZOLE SODIUM 40 MG: 40 TABLET, DELAYED RELEASE ORAL at 15:24

## 2023-10-16 RX ADMIN — ALBUMIN (HUMAN) 25 G: 0.25 INJECTION, SOLUTION INTRAVENOUS at 15:24

## 2023-10-16 RX ADMIN — POTASSIUM BICARBONATE 40 MEQ: 782 TABLET, EFFERVESCENT ORAL at 08:26

## 2023-10-16 RX ADMIN — ERYTHROPOIETIN 10000 UNITS: 10000 INJECTION, SOLUTION INTRAVENOUS; SUBCUTANEOUS at 16:58

## 2023-10-16 RX ADMIN — METOCLOPRAMIDE 5 MG: 5 INJECTION, SOLUTION INTRAMUSCULAR; INTRAVENOUS at 11:19

## 2023-10-16 RX ADMIN — GENTAMICIN SULFATE: 1 CREAM TOPICAL at 17:36

## 2023-10-16 RX ADMIN — SODIUM CHLORIDE, SODIUM LACTATE, CALCIUM CHLORIDE, MAGNESIUM CHLORIDE AND DEXTROSE 6000 ML: 1.5; 538; 448; 18.3; 5.08 INJECTION, SOLUTION INTRAPERITONEAL at 17:36

## 2023-10-16 RX ADMIN — POTASSIUM CHLORIDE AND SODIUM CHLORIDE: 900; 300 INJECTION, SOLUTION INTRAVENOUS at 12:25

## 2023-10-16 RX ADMIN — CEFEPIME 1000 MG: 1 INJECTION, POWDER, FOR SOLUTION INTRAMUSCULAR; INTRAVENOUS at 23:46

## 2023-10-16 RX ADMIN — CEFEPIME 1000 MG: 1 INJECTION, POWDER, FOR SOLUTION INTRAMUSCULAR; INTRAVENOUS at 02:32

## 2023-10-16 RX ADMIN — ALBUMIN (HUMAN) 25 G: 0.25 INJECTION, SOLUTION INTRAVENOUS at 11:18

## 2023-10-16 RX ADMIN — SODIUM CHLORIDE, PRESERVATIVE FREE 10 ML: 5 INJECTION INTRAVENOUS at 22:32

## 2023-10-16 RX ADMIN — KIT FOR THE PREPARATION OF TECHNETIUM TC 99M MEBROFENIN 5 MILLICURIE: 45 INJECTION, POWDER, LYOPHILIZED, FOR SOLUTION INTRAVENOUS at 13:15

## 2023-10-16 RX ADMIN — INSULIN GLARGINE 20 UNITS: 100 INJECTION, SOLUTION SUBCUTANEOUS at 22:30

## 2023-10-16 RX ADMIN — SINCALIDE 1.5 MCG: 5 INJECTION, POWDER, LYOPHILIZED, FOR SOLUTION INTRAVENOUS at 14:17

## 2023-10-16 RX ADMIN — ROSUVASTATIN CALCIUM 40 MG: 10 TABLET, FILM COATED ORAL at 08:26

## 2023-10-16 RX ADMIN — Medication: at 22:31

## 2023-10-16 RX ADMIN — Medication 1 MG: at 08:26

## 2023-10-16 RX ADMIN — GENTAMICIN SULFATE: 1 CREAM TOPICAL at 08:30

## 2023-10-16 RX ADMIN — OXYCODONE HYDROCHLORIDE 5 MG: 5 TABLET ORAL at 01:32

## 2023-10-16 ASSESSMENT — PAIN DESCRIPTION - LOCATION
LOCATION: BACK;BREAST
LOCATION: BACK;CHEST

## 2023-10-16 ASSESSMENT — PAIN DESCRIPTION - FREQUENCY
FREQUENCY: CONTINUOUS
FREQUENCY: CONTINUOUS

## 2023-10-16 ASSESSMENT — PAIN DESCRIPTION - PAIN TYPE
TYPE: CHRONIC PAIN
TYPE: CHRONIC PAIN

## 2023-10-16 ASSESSMENT — PAIN DESCRIPTION - DESCRIPTORS
DESCRIPTORS: PATIENT UNABLE TO DESCRIBE
DESCRIPTORS: OTHER (COMMENT)

## 2023-10-16 ASSESSMENT — PAIN SCALES - GENERAL
PAINLEVEL_OUTOF10: 7
PAINLEVEL_OUTOF10: 6

## 2023-10-16 ASSESSMENT — PAIN DESCRIPTION - ONSET
ONSET: ON-GOING
ONSET: ON-GOING

## 2023-10-16 NOTE — CONSULTS
(CYMBALTA) extended release capsule 20 mg  20 mg Oral BID    folic acid (FOLVITE) tablet 1 mg  1 mg Oral Daily    insulin glargine (LANTUS) injection vial 20 Units  20 Units SubCUTAneous Nightly    ipratropium 0.5 mg-albuterol 2.5 mg (DUONEB) nebulizer solution 1 Dose  1 Dose Inhalation Q6H PRN    melatonin tablet 6 mg  6 mg Oral Nightly PRN    montelukast (SINGULAIR) tablet 10 mg  10 mg Oral Nightly    methocarbamol (ROBAXIN) tablet 500 mg  500 mg Oral TID PRN    oxyCODONE (ROXICODONE) immediate release tablet 5 mg  5 mg Oral Q6H PRN    rosuvastatin (CRESTOR) tablet 40 mg  40 mg Oral Daily    cefepime (MAXIPIME) 1,000 mg in sodium chloride 0.9 % 50 mL IVPB (mini-bag)  1,000 mg IntraVENous Q24H    ondansetron (ZOFRAN) injection 4 mg  4 mg IntraVENous Q6H PRN    potassium bicarb-citric acid (EFFER-K) effervescent tablet 40 mEq  40 mEq Oral Daily    balsum peru-castor oil (VENELEX) ointment   Topical BID    dianeal lo-brandi 1.5% 6,000 mL  6,000 mL IntraPERitoneal Daily before dinner    dianeal lo-brandi 1.5% 6,000 mL  6,000 mL IntraPERitoneal Daily before dinner    gentamicin (GARAMYCIN) 0.1 % cream   Topical Daily    HYDROmorphone HCl PF (DILAUDID) injection 0.25 mg  0.25 mg IntraVENous Q4H PRN    epoetin mohan (EPOGEN;PROCRIT) injection 10,000 Units  10,000 Units SubCUTAneous Once per day on Mon Wed Fri       Social History:  Social History     Tobacco Use    Smoking status: Former     Packs/day: .25     Types: Cigarettes    Smokeless tobacco: Never   Substance Use Topics    Alcohol use: No       Family History:  Family History   Problem Relation Age of Onset    Heart Disease Mother     Cancer Father        Review of Systems:    Constitutional: negative fever, negative chills, negative weight loss  Eyes:   negative visual changes  ENT:   negative sore throat, tongue or lip swelling  Respiratory:  negative cough, negative dyspnea  Cards:  negative for chest pain, palpitations, lower extremity edema  GI:   See results for input(s): \"INR\", \"APTT\" in the last 72 hours. Invalid input(s): \"PTP\"     Imaging studies reviewed    Assessment / Plan :     69 yo F w/ pmh ESRD, PD, HFrEF, CABG, h pylori referred to GI for upper abdominal pain. CT showed some GB sludge and stones. Surgery following - planning for HIDA. She was recently scoped and had several gastric ulcers and diagnosed with h pylori. She was prescribed Pylera and PPI and is due for repeat testing to ensure clearance. - EGD tomorrow 10/17  - NPO after midnight  - thank you for the consultation, GI will follow along with you     Patient Active Hospital Problem List:   Principal Problem:    Elevated troponin  Resolved Problems:    * No resolved hospital problems. Oz Ashley PA-C  10/16/23  2:57 PM  I have personally reviewed the history and independently examined the patient. I have reviewed the chart and agree with the documentation recorded by the Mid Level Provider, including the assessment, treatment plan, and disposition. ASSESSMENT AND PLAN:  70 yr old lady who presented with acute abdominal pain and nausea. EGD 3 months ago showed H pylori gastritis and gastric ulcers which were treated with Pylera and PPI. Differential includes peptic ulcer, recurrent H pylori infections, etc.  NPO after MN  EGD tomorrow  Further recommendations to follow after EGD. Thank you for consultation.     Orlin Oshea MD

## 2023-10-16 NOTE — CONSULTS
Comprehensive Nutrition Assessment    Type and Reason for Visit: Initial, Wound, Consult    Nutrition Recommendations/Plan:   As medically feasible/ tolerated, advance diet to consistent CHO 60 gm/meal  Trial diabetic (chocolate), renal, and protein modular ONS each once daily upon diet advance and monitor acceptance        Malnutrition Assessment:  Malnutrition Status:  Insufficient data (10/16/23 0118)         Nutrition Assessment:    PMHx includes CAD s/p CABG, ESRD on PD, recurrent N/V and abdominal pain, gastritis, gastric intestinal metaplasia, S/P EGD 8/2023 which showed multiple large ulcers that were biopsied, HFrEF who presented to RD with c/o abdominal pain. Patient states she has had constant, aching, generalized abdominal pain for the last week and a half. States symptoms were present at time of discharge from hospital.  She was recently admitted and underwent CABG. Pt admitted with recurrent abdominal pain. Noted for gallstones, UTI, ESRD on PD. BPA received for wounds. WOCN assessment on 10/13 without any significant wounds. However, MD consulted RD this AM for ONS/ PCM. Currently NPO for HIDA scan this afternoon. Pt being evaluated by surgery for abdominal pain and gallstones. Went to visit this pt this morning and she was sound asleep, now off unit. Pt known to this service from last admission. Fair to good PO intake/ appetite during that admission. Was open to ONS if needed, chocolate flavor preferred. Will trial pt with chocolate Glucerna, Nepro, and Gelatein upon diet advancement. Wt hx difficult to assess without speaking with pt, especially given PD hx. She was admitted in August ~195 lb, but weight trended in the 170's majority of that admission. Appears she was down as low as 154 lb recently, not sure this is accurate. Renal labs OK, folic acid low in July - currently getting supplementation.         Nutritionally Significant Medications:  Cefepime, folvite, SSI, 10/15/23  1843 10/16/23  0803 10/16/23  0804 10/16/23  1215 10/16/23  1217 10/16/23  1253   POCGLU 170* 150* 197* 210* 55* 70 58* 57* 71       Lab Results   Component Value Date/Time    LABA1C 6.1 08/28/2023 06:21 PM    LABA1C 5.5 08/18/2023 08:33 PM    LABA1C 5.4 07/19/2023 12:10 AM     08/28/2023 06:21 PM       Iron   Date Value Ref Range Status   10/06/2023 69 35 - 150 ug/dL Final   09/14/2023 26 (L) 35 - 150 ug/dL Final   08/22/2023 67 35 - 150 ug/dL Final   07/21/2023 57 35 - 150 ug/dL Final     Comment:     SPECIMEN HEMOLYZED, RESULTS MAY BE AFFECTED   03/03/2023 20 (L) 35 - 150 ug/dL Final     TIBC   Date Value Ref Range Status   10/06/2023 138 (L) 250 - 450 ug/dL Final   09/14/2023 74 (L) 250 - 450 ug/dL Final   08/22/2023 99 (L) 250 - 450 ug/dL Final   07/21/2023 92 (L) 250 - 450 ug/dL Final     Comment:     SPECIMEN HEMOLYZED, RESULTS MAY BE AFFECTED   03/03/2023 123 (L) 250 - 450 ug/dL Final     Iron % Saturation   Date Value Ref Range Status   10/06/2023 50 20 - 50 % Final   09/14/2023 35 20 - 50 % Final   08/22/2023 68 (H) 20 - 50 % Final   07/21/2023 62 (H) 20 - 50 % Final       Ferritin   Date Value Ref Range Status   10/06/2023 2,474 (H) 26 - 388 NG/ML Final   07/21/2023 1,160 (H) 26 - 388 NG/ML Final   03/04/2023 906 (H) 8 - 252 NG/ML Final       Folate   Date Value Ref Range Status   07/21/2023 4.0 (L) 5.0 - 21.0 ng/mL Final     Comment:     SPECIMEN HEMOLYZED, RESULTS MAY BE AFFECTED   10/19/2022 5.2 5.0 - 21.0 ng/mL Final   12/02/2021 18.1 5.0 - 21.0 ng/mL Final     Comment:     SPECIMEN HEMOLYZED, RESULTS MAY BE AFFECTED     Vitamin B-12   Date Value Ref Range Status   07/21/2023 1130 (H) 193 - 986 pg/mL Final   10/19/2022 479 193 - 986 pg/mL Final   12/02/2021 589 193 - 986 pg/mL Final     Bravo Pascal RD  Available via "[employer]"

## 2023-10-16 NOTE — PROGRESS NOTES
1700: Called and left vm for Miguelangel to ask if/when someone was coming to set up pt's peritoneal dialysis.     [id number]: Spoke w/ Lorin Stone RN and she is getting the bags from pharmacy and will be coming to set it up asap

## 2023-10-16 NOTE — PROGRESS NOTES
Occupational Therapy  10/16/23    OT order received and acknowledged. Chart reviewed and attempted to see patient for eval. Pt currently VANDANA for testing. Will defer for now and attempt back later as able.     Thank you   Belgica Laughlin OTD, OTR/L

## 2023-10-16 NOTE — PROGRESS NOTES
[facility]   [address], [address] [facility], [address] [facility]  Phone: [phone]   [phone]    [email or web address]     Nephrology Progress Note    Patient Name : Keesha Luna      : 1955     MRN : [id number]  Date of Admission : 10/12/2023  Date of Servive : 10/16/23    CC: Follow up for ESRD       Assessment and Plan   ESRD on PD:  -CCPD nightly at home, patient of Dr. Falguni Riggs clinic  -Home Rx: 5X 2300 mL, 9.5-hour treatment time, 1000 mL icodextrin last fill  -Continue with current CCPD orders  - No IVF given per EMR  - IVF restarted x 24 hours- patient is dry on exam and re-absorbing PD fluid. - Electrolytes stable    Abd pain:  -No evidence of peritonitis  - 10/13 cell count normal     Anemia of CKD:  - last Hgb stable will recheck in AM  - continue LIS MWF    Hypokalemia:  -K stable     Hypertension:  - BP improved    CAD s/p CABG 23     DM  COPD  HLD     Interval History:  Patient seen and examined this AM. She denies nausea or vomiting over night. Currently connected to cycler-  ml  this AM.    Review of Systems: Pertinent items are noted in HPI.     Current Medications:   Current Facility-Administered Medications   Medication Dose Route Frequency    albuterol sulfate HFA (PROVENTIL;VENTOLIN;PROAIR) 108 (90 Base) MCG/ACT inhaler 2 puff  2 puff Inhalation Q6H PRN    metoclopramide (REGLAN) injection 5 mg  5 mg IntraVENous TID AC    dextrose bolus 10% 125 mL  125 mL IntraVENous PRN    Or    dextrose bolus 10% 250 mL  250 mL IntraVENous PRN    glucagon injection 1 mg  1 mg SubCUTAneous PRN    dextrose 10 % infusion   IntraVENous Continuous PRN    insulin lispro (HUMALOG) injection vial 0-8 Units  0-8 Units SubCUTAneous TID WC    insulin lispro (HUMALOG) injection vial 0-4 Units  0-4 Units SubCUTAneous Nightly    sodium chloride flush 0.9 % injection 5-40 mL  5-40 mL IntraVENous 2 times per day    sodium chloride flush 0.9 % injection 5-40 mL Weight:       Height:         Intake and Output:  No intake/output data recorded. 10/14 0701 - 10/15 1900  In: 484 [P.O.:120]  Out: -193     Physical Examination:  General: NAD,Conversant   Neck:  Supple, no mass  Resp:  Lungs CTA B/L, no wheezing , normal respiratory effort  CV:  RRR,  no murmur or rub, LE edema  GI:  Soft, NT, + BS, no HS megaly, PD exit site clean  Neurologic:  Non focal  Psych:             AAO x 3 appropriate affect   Skin:  No Rash      []    High complexity decision making was performed  []    Patient is at high-risk of decompensation with multiple organ involvement    Lab Data Personally Reviewed: I have reviewed all the pertinent labs, microbiology data and radiology studies during assessment. Labs:  Recent Labs     10/14/23  0638 10/15/23  0127 10/16/23  0140   * 128* 132*   K 3.8 3.9 3.8   CL 94* 94* 97   CO2 21 25 25   GLUCOSE 148* 174* 138*   BUN 33* 35* 32*   CREATININE 7.85* 7.75* 7.02*   CALCIUM 7.9* 7.9* 8.0*         Recent Labs     10/14/23  0644   WBC 8.4   RBC 2.94*   HGB 8.3*   HCT 27.8*   MCV 94.6   MCH 28.2   MCHC 29.9*   RDW 17.2*      MPV 9.6       Recent Labs     10/16/23  0140   GLOB 5.1*       No results for input(s): \"INR\", \"APTT\" in the last 72 hours. Invalid input(s): \"PTP\"     No results for input(s): \"CPK\", \"CKMB\", \"TROPONINI\" in the last 72 hours. Invalid input(s): \"B-NP\"  Invalid input(s): \"PHI\", \"PCO2I\", \"PO2I\", \"FIO2I\"     Ventilator:       Microbiology:  No results found for: \"SDES\"  No components found for: \"CULT\"      I have reviewed the flowsheets. Chart and Pertinent Notes have been reviewed. No change in PMH ,family and social history from Consult note.       Carmen Harris APRN - NP  [facility]

## 2023-10-16 NOTE — PROGRESS NOTES
Hospitalist Progress Note  Jay Payne MD  Answering service: [phone]        Date of Service:  10/16/2023  NAME:  Paris Carlos  :  1955  MRN:  [id number]      Admission Summary:   Paris Carlos is a 70 y.o. female with hx of cad s/p cabg, esrd on PD, chronic recurrent nausea, vomiting and abdominal pain, hfref who presented to ed with complaints of abdominal pain. Patient states she has had constant, aching, generalized abdominal pain for the last week and a half. States symptoms were present at time of discharge from hospital.  She was recently admitted and underwent CABG. She endorses aching sternal pain which she states is unchanged since her surgical procedure. She has had 1 episode of nonbloody, nonbilious emesis. Endorses malaise, fatigue and some anorexia due to her aching abdominal pain. Patient was just admitted from  to 10/6 for syncopal, UTI ,hyponatremia and similar abd pain, vomiting, nausea. The patient denies any fever, chills, chest pain, cough, congestion, recent illness, palpitations, or dysuria/urgency or frequency. Remarkable vitals on ER Presentation: vss  Labs Remarkable for: lactic acid 2.9, cr 7.71, hgb 7.5, UA: lg bld, wbc, 1+ bact  ER Images: ct chest abd et pelvis: no acute process  ER Rx: asa 324, cefepime, cymbalta   CT ABDOMEN PELVIS WO CONTRAST Additional Contrast? None    Result Date: 10/13/2023  1. No acute pathology in the chest, abdomen, or pelvis. 2.  Small pulmonary nodules. Consider follow-up chest CT in one year to patient's history of smoking. 3.  Peritoneal catheter in place with free fluid in the abdomen and pelvis. 4.  Small stones or sludge in gallbladder. CT CHEST WO CONTRAST    Result Date: 10/13/2023  1. No acute pathology in the chest, abdomen, or pelvis. 2.  Small pulmonary nodules.  Consider follow-up chest CT in one year to patient's history

## 2023-10-16 NOTE — PROGRESS NOTES
This was part of the rounds of the  in [address]. The patient did not respond to the knocking of the . The  waited for awhile but there was no reaction. The patient was deeply asleep. The  may visit again this patient, but not immediate.     [id number] [facility]

## 2023-10-16 NOTE — PROGRESS NOTES
Physical Therapy 10/16/2023         Chart reviewed. Attempted evaluation but patient off the floor.    Will continue to follow    Maryam Robles, PT

## 2023-10-16 NOTE — CONSULTS
[facility]  Inpatient Consultation        Admit Date: 10/12/2023  Reason for Consultation: Abdominal pain x10 days, gallstones    HPI:  Kia Price is a 70 y.o. female with PMH ESRD on PD, CAD s/p CABG on 9/6/2023, HFrEF, HTN, COPD, DM2, asthma, GERD, and HTN of whom we are asked to see in consultation by Dr. Jasper Ramos for the above complaint. She was admitted to the hospital on 10/12 for achy abdominal pain, nausea, and vomiting that has been persistent since her CABG procedure on 9/6/2023. She was hospitalized from 9/30 to 10/6 for similar complains along with syncope, UTI, and hyponatremia. CT abdomen/pelvis on 10/13 showed sludge or small stones in the gallbladder with no CBD dilation. She describes her pain as persistent and band-like across the upper abdomen and back. She has had nausea and vomiting, but her symptoms are controlled today. Denies any chest pain, shortness of breath, diarrhea, or constipation. EXAM: CT CHEST WO CONTRAST, CT ABDOMEN PELVIS WO CONTRAST     INDICATION: chest pain w/ recent cabg     COMPARISON: None     IV CONTRAST: None     ORAL CONTRAST: None     TECHNIQUE:   Noncontrast, thin axial images were obtained through the chest, abdomen and  pelvis. Coronal and sagittal reformats were generated. CT dose reduction was  achieved through use of a standardized protocol tailored for this examination  and automatic exposure control for dose modulation. Absence of IV contrast  limits evaluation     FINDINGS:      CHEST WALL: No mass or axillary lymphadenopathy. THYROID: Hypodensity in the left thyroid lobe. MEDIASTINUM: Median sternotomy changes. Coronary vascular calcification. RYLEY: No mass or lymphadenopathy. THORACIC AORTA: No dissection or aneurysm. MAIN PULMONARY ARTERY: Normal in caliber. TRACHEA/BRONCHI: Patent. ESOPHAGUS: No esophageal wall thickening. Fluid adjacent to the esophagus. HEART: Normal in size.  Coronary artery calcium: dextrose bolus 10% 250 mL  250 mL IntraVENous PRN    glucagon injection 1 mg  1 mg SubCUTAneous PRN    dextrose 10 % infusion   IntraVENous Continuous PRN    insulin lispro (HUMALOG) injection vial 0-8 Units  0-8 Units SubCUTAneous TID WC    insulin lispro (HUMALOG) injection vial 0-4 Units  0-4 Units SubCUTAneous Nightly    sodium chloride flush 0.9 % injection 5-40 mL  5-40 mL IntraVENous 2 times per day    sodium chloride flush 0.9 % injection 5-40 mL  5-40 mL IntraVENous PRN    0.9 % sodium chloride infusion   IntraVENous PRN    acetaminophen (TYLENOL) tablet 650 mg  650 mg Oral Q6H PRN    Or    acetaminophen (TYLENOL) suppository 650 mg  650 mg Rectal Q6H PRN    DULoxetine (CYMBALTA) extended release capsule 20 mg  20 mg Oral BID    folic acid (FOLVITE) tablet 1 mg  1 mg Oral Daily    insulin glargine (LANTUS) injection vial 20 Units  20 Units SubCUTAneous Nightly    ipratropium 0.5 mg-albuterol 2.5 mg (DUONEB) nebulizer solution 1 Dose  1 Dose Inhalation Q6H PRN    melatonin tablet 6 mg  6 mg Oral Nightly PRN    montelukast (SINGULAIR) tablet 10 mg  10 mg Oral Nightly    methocarbamol (ROBAXIN) tablet 500 mg  500 mg Oral TID PRN    oxyCODONE (ROXICODONE) immediate release tablet 5 mg  5 mg Oral Q6H PRN    pantoprazole (PROTONIX) tablet 40 mg  40 mg Oral Daily    rosuvastatin (CRESTOR) tablet 40 mg  40 mg Oral Daily    cefepime (MAXIPIME) 1,000 mg in sodium chloride 0.9 % 50 mL IVPB (mini-bag)  1,000 mg IntraVENous Q24H    ondansetron (ZOFRAN) injection 4 mg  4 mg IntraVENous Q6H PRN    potassium bicarb-citric acid (EFFER-K) effervescent tablet 40 mEq  40 mEq Oral Daily    balsum peru-castor oil (VENELEX) ointment   Topical BID    dianeal lo-brandi 1.5% 6,000 mL  6,000 mL IntraPERitoneal Daily before dinner    dianeal lo-brandi 1.5% 6,000 mL  6,000 mL IntraPERitoneal Daily before dinner    gentamicin (GARAMYCIN) 0.1 % cream   Topical Daily    HYDROmorphone HCl PF (DILAUDID) injection 0.25 mg  0.25 mg IntraVENous Q4H

## 2023-10-17 ENCOUNTER — ANESTHESIA (OUTPATIENT)
Facility: HOSPITAL | Age: 68
End: 2023-10-17
Payer: MEDICARE

## 2023-10-17 ENCOUNTER — ANESTHESIA EVENT (OUTPATIENT)
Facility: HOSPITAL | Age: 68
End: 2023-10-17
Payer: MEDICARE

## 2023-10-17 LAB
ALBUMIN SERPL-MCNC: 2 G/DL (ref 3.5–5)
ANION GAP SERPL CALC-SCNC: 8 MMOL/L (ref 5–15)
ANION GAP SERPL CALC-SCNC: 8 MMOL/L (ref 5–15)
BACTERIA SPEC CULT: NORMAL
BASOPHILS # BLD: 0.1 K/UL (ref 0–0.1)
BASOPHILS NFR BLD: 1 % (ref 0–1)
BUN SERPL-MCNC: 30 MG/DL (ref 6–20)
BUN SERPL-MCNC: 30 MG/DL (ref 6–20)
BUN/CREAT SERPL: 5 (ref 12–20)
BUN/CREAT SERPL: 5 (ref 12–20)
CALCIUM SERPL-MCNC: 7.3 MG/DL (ref 8.5–10.1)
CALCIUM SERPL-MCNC: 7.6 MG/DL (ref 8.5–10.1)
CHLORIDE SERPL-SCNC: 100 MMOL/L (ref 97–108)
CHLORIDE SERPL-SCNC: 100 MMOL/L (ref 97–108)
CO2 SERPL-SCNC: 25 MMOL/L (ref 21–32)
CO2 SERPL-SCNC: 26 MMOL/L (ref 21–32)
CREAT SERPL-MCNC: 6.44 MG/DL (ref 0.55–1.02)
CREAT SERPL-MCNC: 6.59 MG/DL (ref 0.55–1.02)
DIFFERENTIAL METHOD BLD: ABNORMAL
EOSINOPHIL # BLD: 0.2 K/UL (ref 0–0.4)
EOSINOPHIL NFR BLD: 3 % (ref 0–7)
ERYTHROCYTE [DISTWIDTH] IN BLOOD BY AUTOMATED COUNT: 17.1 % (ref 11.5–14.5)
ERYTHROCYTE [DISTWIDTH] IN BLOOD BY AUTOMATED COUNT: 17.2 % (ref 11.5–14.5)
GLUCOSE BLD STRIP.AUTO-MCNC: 115 MG/DL (ref 65–117)
GLUCOSE BLD STRIP.AUTO-MCNC: 128 MG/DL (ref 65–117)
GLUCOSE BLD STRIP.AUTO-MCNC: 141 MG/DL (ref 65–117)
GLUCOSE BLD STRIP.AUTO-MCNC: 182 MG/DL (ref 65–117)
GLUCOSE BLD STRIP.AUTO-MCNC: 60 MG/DL (ref 65–117)
GLUCOSE BLD STRIP.AUTO-MCNC: 60 MG/DL (ref 65–117)
GLUCOSE BLD STRIP.AUTO-MCNC: 62 MG/DL (ref 65–117)
GLUCOSE BLD STRIP.AUTO-MCNC: 65 MG/DL (ref 65–117)
GLUCOSE SERPL-MCNC: 106 MG/DL (ref 65–100)
GLUCOSE SERPL-MCNC: 116 MG/DL (ref 65–100)
GRAM STN SPEC: NORMAL
GRAM STN SPEC: NORMAL
HCT VFR BLD AUTO: 23.3 % (ref 35–47)
HCT VFR BLD AUTO: 25.8 % (ref 35–47)
HCT VFR BLD AUTO: 26.3 % (ref 35–47)
HGB BLD-MCNC: 6.9 G/DL (ref 11.5–16)
HGB BLD-MCNC: 7.5 G/DL (ref 11.5–16)
HGB BLD-MCNC: 7.7 G/DL (ref 11.5–16)
IMM GRANULOCYTES # BLD AUTO: 0.1 K/UL (ref 0–0.04)
IMM GRANULOCYTES NFR BLD AUTO: 1 % (ref 0–0.5)
LYMPHOCYTES # BLD: 1.4 K/UL (ref 0.8–3.5)
LYMPHOCYTES NFR BLD: 24 % (ref 12–49)
MCH RBC QN AUTO: 27.8 PG (ref 26–34)
MCH RBC QN AUTO: 27.9 PG (ref 26–34)
MCHC RBC AUTO-ENTMCNC: 29.3 G/DL (ref 30–36.5)
MCHC RBC AUTO-ENTMCNC: 29.6 G/DL (ref 30–36.5)
MCV RBC AUTO: 94 FL (ref 80–99)
MCV RBC AUTO: 95.3 FL (ref 80–99)
MONOCYTES # BLD: 0.3 K/UL (ref 0–1)
MONOCYTES NFR BLD: 6 % (ref 5–13)
NEUTS SEG # BLD: 3.7 K/UL (ref 1.8–8)
NEUTS SEG NFR BLD: 65 % (ref 32–75)
NRBC # BLD: 0 K/UL (ref 0–0.01)
NRBC # BLD: 0 K/UL (ref 0–0.01)
NRBC BLD-RTO: 0 PER 100 WBC
NRBC BLD-RTO: 0 PER 100 WBC
PHOSPHATE SERPL-MCNC: 3.4 MG/DL (ref 2.6–4.7)
PLATELET # BLD AUTO: 203 K/UL (ref 150–400)
PLATELET # BLD AUTO: 217 K/UL (ref 150–400)
PMV BLD AUTO: 10.1 FL (ref 8.9–12.9)
PMV BLD AUTO: 10.1 FL (ref 8.9–12.9)
POTASSIUM SERPL-SCNC: 6 MMOL/L (ref 3.5–5.1)
POTASSIUM SERPL-SCNC: ABNORMAL MMOL/L (ref 3.5–5.1)
RBC # BLD AUTO: 2.48 M/UL (ref 3.8–5.2)
RBC # BLD AUTO: 2.76 M/UL (ref 3.8–5.2)
RBC MORPH BLD: ABNORMAL
SERVICE CMNT-IMP: ABNORMAL
SERVICE CMNT-IMP: NORMAL
SODIUM SERPL-SCNC: 133 MMOL/L (ref 136–145)
SODIUM SERPL-SCNC: 134 MMOL/L (ref 136–145)
WBC # BLD AUTO: 5.8 K/UL (ref 3.6–11)
WBC # BLD AUTO: 6.1 K/UL (ref 3.6–11)

## 2023-10-17 PROCEDURE — 7100000010 HC PHASE II RECOVERY - FIRST 15 MIN: Performed by: SPECIALIST

## 2023-10-17 PROCEDURE — 2500000003 HC RX 250 WO HCPCS

## 2023-10-17 PROCEDURE — 80069 RENAL FUNCTION PANEL: CPT

## 2023-10-17 PROCEDURE — 3700000000 HC ANESTHESIA ATTENDED CARE: Performed by: SPECIALIST

## 2023-10-17 PROCEDURE — 3700000001 HC ADD 15 MINUTES (ANESTHESIA): Performed by: SPECIALIST

## 2023-10-17 PROCEDURE — 2580000003 HC RX 258: Performed by: FAMILY MEDICINE

## 2023-10-17 PROCEDURE — 6370000000 HC RX 637 (ALT 250 FOR IP): Performed by: HOSPITALIST

## 2023-10-17 PROCEDURE — 6370000000 HC RX 637 (ALT 250 FOR IP): Performed by: INTERNAL MEDICINE

## 2023-10-17 PROCEDURE — 85027 COMPLETE CBC AUTOMATED: CPT

## 2023-10-17 PROCEDURE — 94760 N-INVAS EAR/PLS OXIMETRY 1: CPT

## 2023-10-17 PROCEDURE — 97165 OT EVAL LOW COMPLEX 30 MIN: CPT

## 2023-10-17 PROCEDURE — 3600007502: Performed by: SPECIALIST

## 2023-10-17 PROCEDURE — 86923 COMPATIBILITY TEST ELECTRIC: CPT

## 2023-10-17 PROCEDURE — 85014 HEMATOCRIT: CPT

## 2023-10-17 PROCEDURE — 6370000000 HC RX 637 (ALT 250 FOR IP): Performed by: FAMILY MEDICINE

## 2023-10-17 PROCEDURE — 2580000003 HC RX 258: Performed by: NURSE PRACTITIONER

## 2023-10-17 PROCEDURE — 85025 COMPLETE CBC W/AUTO DIFF WBC: CPT

## 2023-10-17 PROCEDURE — 6360000002 HC RX W HCPCS

## 2023-10-17 PROCEDURE — 97530 THERAPEUTIC ACTIVITIES: CPT

## 2023-10-17 PROCEDURE — 88305 TISSUE EXAM BY PATHOLOGIST: CPT

## 2023-10-17 PROCEDURE — 3600007512: Performed by: SPECIALIST

## 2023-10-17 PROCEDURE — 6360000002 HC RX W HCPCS: Performed by: INTERNAL MEDICINE

## 2023-10-17 PROCEDURE — 36415 COLL VENOUS BLD VENIPUNCTURE: CPT

## 2023-10-17 PROCEDURE — 85018 HEMOGLOBIN: CPT

## 2023-10-17 PROCEDURE — 7100000011 HC PHASE II RECOVERY - ADDTL 15 MIN: Performed by: SPECIALIST

## 2023-10-17 PROCEDURE — 86901 BLOOD TYPING SEROLOGIC RH(D): CPT

## 2023-10-17 PROCEDURE — 6360000002 HC RX W HCPCS: Performed by: HOSPITALIST

## 2023-10-17 PROCEDURE — 86900 BLOOD TYPING SEROLOGIC ABO: CPT

## 2023-10-17 PROCEDURE — 80048 BASIC METABOLIC PNL TOTAL CA: CPT

## 2023-10-17 PROCEDURE — 0DB68ZX EXCISION OF STOMACH, VIA NATURAL OR ARTIFICIAL OPENING ENDOSCOPIC, DIAGNOSTIC: ICD-10-PCS | Performed by: SPECIALIST

## 2023-10-17 PROCEDURE — 97161 PT EVAL LOW COMPLEX 20 MIN: CPT

## 2023-10-17 PROCEDURE — 86850 RBC ANTIBODY SCREEN: CPT

## 2023-10-17 PROCEDURE — 2580000003 HC RX 258: Performed by: SPECIALIST

## 2023-10-17 PROCEDURE — 90945 DIALYSIS ONE EVALUATION: CPT

## 2023-10-17 PROCEDURE — 2709999900 HC NON-CHARGEABLE SUPPLY: Performed by: SPECIALIST

## 2023-10-17 PROCEDURE — 2580000003 HC RX 258

## 2023-10-17 PROCEDURE — 2580000003 HC RX 258: Performed by: HOSPITALIST

## 2023-10-17 PROCEDURE — 2060000000 HC ICU INTERMEDIATE R&B

## 2023-10-17 PROCEDURE — 2580000003 HC RX 258: Performed by: INTERNAL MEDICINE

## 2023-10-17 PROCEDURE — 88342 IMHCHEM/IMCYTCHM 1ST ANTB: CPT

## 2023-10-17 PROCEDURE — 97535 SELF CARE MNGMENT TRAINING: CPT

## 2023-10-17 PROCEDURE — 82962 GLUCOSE BLOOD TEST: CPT

## 2023-10-17 PROCEDURE — 6370000000 HC RX 637 (ALT 250 FOR IP): Performed by: SPECIALIST

## 2023-10-17 RX ORDER — SODIUM CHLORIDE 0.9 % (FLUSH) 0.9 %
5-40 SYRINGE (ML) INJECTION EVERY 12 HOURS SCHEDULED
Status: DISCONTINUED | OUTPATIENT
Start: 2023-10-17 | End: 2023-10-17 | Stop reason: HOSPADM

## 2023-10-17 RX ORDER — SODIUM CHLORIDE 9 MG/ML
INJECTION, SOLUTION INTRAVENOUS CONTINUOUS
Status: DISCONTINUED | OUTPATIENT
Start: 2023-10-17 | End: 2023-10-18

## 2023-10-17 RX ORDER — SODIUM CHLORIDE 0.9 % (FLUSH) 0.9 %
5-40 SYRINGE (ML) INJECTION PRN
Status: DISCONTINUED | OUTPATIENT
Start: 2023-10-17 | End: 2023-10-17 | Stop reason: HOSPADM

## 2023-10-17 RX ORDER — SIMETHICONE 20 MG/.3ML
EMULSION ORAL PRN
Status: DISCONTINUED | OUTPATIENT
Start: 2023-10-17 | End: 2023-10-17 | Stop reason: ALTCHOICE

## 2023-10-17 RX ORDER — SODIUM CHLORIDE 9 MG/ML
INJECTION, SOLUTION INTRAVENOUS CONTINUOUS PRN
Status: COMPLETED | OUTPATIENT
Start: 2023-10-17 | End: 2023-10-17

## 2023-10-17 RX ORDER — SODIUM CHLORIDE 9 MG/ML
25 INJECTION, SOLUTION INTRAVENOUS PRN
Status: DISCONTINUED | OUTPATIENT
Start: 2023-10-17 | End: 2023-10-17 | Stop reason: HOSPADM

## 2023-10-17 RX ORDER — LIDOCAINE HYDROCHLORIDE 20 MG/ML
INJECTION, SOLUTION EPIDURAL; INFILTRATION; INTRACAUDAL; PERINEURAL PRN
Status: DISCONTINUED | OUTPATIENT
Start: 2023-10-17 | End: 2023-10-17 | Stop reason: SDUPTHER

## 2023-10-17 RX ORDER — SODIUM CHLORIDE 9 MG/ML
INJECTION, SOLUTION INTRAVENOUS CONTINUOUS
Status: DISCONTINUED | OUTPATIENT
Start: 2023-10-17 | End: 2023-10-17

## 2023-10-17 RX ORDER — SODIUM CHLORIDE 9 MG/ML
INJECTION, SOLUTION INTRAVENOUS CONTINUOUS PRN
Status: DISCONTINUED | OUTPATIENT
Start: 2023-10-17 | End: 2023-10-17 | Stop reason: SDUPTHER

## 2023-10-17 RX ADMIN — METOCLOPRAMIDE 5 MG: 5 INJECTION, SOLUTION INTRAMUSCULAR; INTRAVENOUS at 06:11

## 2023-10-17 RX ADMIN — Medication: at 10:27

## 2023-10-17 RX ADMIN — METOCLOPRAMIDE 5 MG: 5 INJECTION, SOLUTION INTRAMUSCULAR; INTRAVENOUS at 16:28

## 2023-10-17 RX ADMIN — POTASSIUM CHLORIDE AND SODIUM CHLORIDE: 900; 300 INJECTION, SOLUTION INTRAVENOUS at 10:32

## 2023-10-17 RX ADMIN — SODIUM CHLORIDE, SODIUM LACTATE, CALCIUM CHLORIDE, MAGNESIUM CHLORIDE AND DEXTROSE 6000 ML: 1.5; 538; 448; 18.3; 5.08 INJECTION, SOLUTION INTRAPERITONEAL at 17:46

## 2023-10-17 RX ADMIN — SODIUM CHLORIDE: 9 INJECTION, SOLUTION INTRAVENOUS at 10:39

## 2023-10-17 RX ADMIN — Medication: at 21:36

## 2023-10-17 RX ADMIN — PROPOFOL 30 MG: 10 INJECTION, EMULSION INTRAVENOUS at 09:38

## 2023-10-17 RX ADMIN — GENTAMICIN SULFATE: 1 CREAM TOPICAL at 10:30

## 2023-10-17 RX ADMIN — MONTELUKAST 10 MG: 10 TABLET, FILM COATED ORAL at 21:36

## 2023-10-17 RX ADMIN — GENTAMICIN SULFATE: 1 CREAM TOPICAL at 17:45

## 2023-10-17 RX ADMIN — PROPOFOL 20 MG: 10 INJECTION, EMULSION INTRAVENOUS at 09:45

## 2023-10-17 RX ADMIN — PROPOFOL 20 MG: 10 INJECTION, EMULSION INTRAVENOUS at 09:40

## 2023-10-17 RX ADMIN — Medication 1 MG: at 10:26

## 2023-10-17 RX ADMIN — LIDOCAINE HYDROCHLORIDE 80 MG: 20 INJECTION, SOLUTION EPIDURAL; INFILTRATION; INTRACAUDAL; PERINEURAL at 09:38

## 2023-10-17 RX ADMIN — DULOXETINE HYDROCHLORIDE 20 MG: 20 CAPSULE, DELAYED RELEASE ORAL at 10:26

## 2023-10-17 RX ADMIN — SODIUM CHLORIDE: 9 INJECTION, SOLUTION INTRAVENOUS at 09:35

## 2023-10-17 RX ADMIN — PANTOPRAZOLE SODIUM 40 MG: 40 TABLET, DELAYED RELEASE ORAL at 16:27

## 2023-10-17 RX ADMIN — ROSUVASTATIN CALCIUM 40 MG: 10 TABLET, FILM COATED ORAL at 10:26

## 2023-10-17 RX ADMIN — METOCLOPRAMIDE 5 MG: 5 INJECTION, SOLUTION INTRAMUSCULAR; INTRAVENOUS at 10:26

## 2023-10-17 RX ADMIN — SODIUM CHLORIDE, PRESERVATIVE FREE 10 ML: 5 INJECTION INTRAVENOUS at 08:00

## 2023-10-17 RX ADMIN — DULOXETINE HYDROCHLORIDE 20 MG: 20 CAPSULE, DELAYED RELEASE ORAL at 21:36

## 2023-10-17 RX ADMIN — DEXTROSE MONOHYDRATE 125 ML: 100 INJECTION, SOLUTION INTRAVENOUS at 08:05

## 2023-10-17 RX ADMIN — DEXTROSE MONOHYDRATE 125 ML: 100 INJECTION, SOLUTION INTRAVENOUS at 11:56

## 2023-10-17 RX ADMIN — SODIUM CHLORIDE: 9 INJECTION, SOLUTION INTRAVENOUS at 16:30

## 2023-10-17 RX ADMIN — PANTOPRAZOLE SODIUM 40 MG: 40 TABLET, DELAYED RELEASE ORAL at 06:12

## 2023-10-17 RX ADMIN — POTASSIUM BICARBONATE 40 MEQ: 782 TABLET, EFFERVESCENT ORAL at 10:25

## 2023-10-17 ASSESSMENT — ENCOUNTER SYMPTOMS: SHORTNESS OF BREATH: 1

## 2023-10-17 NOTE — PROGRESS NOTES
Occupational Therapy Contact Note  10/17/23    Orders received and acknowledged. Chart reviewed and spoke with RN, patient is currently VANDANA to endo for ordered procedure. Will follow up with patient to complete OT eval as able.     Thank you,  JULIO CÉSAR Lehman, OTR/L

## 2023-10-17 NOTE — FLOWSHEET NOTE
10/17/23 1815   Observations & Evaluations   Level of Consciousness 1   Heart Rhythm Regular   Respiratory Quality/Effort Unlabored   O2 Device None (Room air)   Skin Condition/Temp Warm;Dry   Abdomen Inspection Soft   Edema None   Vital Signs   BP (!) 137/54   Pulse (!) 104   Respirations 18        10/17/23 1815   Vitals   BP (!) 137/54   Pulse (!) 104   Respirations 18   Peritoneal Dialysis Catheter Mid lower abdomen   No placement date or time found.    Catheter Location: Mid lower abdomen   Status Accessed   Site Condition Clean, dry, intact   Dressing Status New dressing applied   Dressing Gauze  (gentamicin cream)   Date of Last Dressing Change 10/17/23   Dialysis Type Continuous cycling   Exit Site Condition Good   Catheter Care Given Yes   Cycler   Verification of Prescription CCPD   Informed Consent    (chronic consent conveys)   Total Volume Programmed 66501 mL   Therapy Time (Hours:Minutes) 10   Cycler Type [facility]   Fill Volume 2300 mL   Last Fill Volume 0 mL   Dextrose Setting Same (Nonextraneal)   I Drain Alarm 0 mL   Number of Cycles 5   Bag Volume 6000 mL   Number of Bags Used 2   Dianeal Solution   (1.5% in 6 liters)     Primary RN SBAR: Payton Oliveros RN   Patient Education: procedural  Time out:  1800  Hepatitis B Surface Ag   Date/Time Value Ref Range Status   10/01/2023 01:39 AM <0.10 Index Final     Hep B S Ab   Date/Time Value Ref Range Status   10/05/2023 12:41 AM <3.10 mIU/mL Final     1815 one minute alcavis scrub followed by one minute soak and initiation of PD treatment as ordered, call bell within reach, bed in low position SBAR with primary nurse

## 2023-10-17 NOTE — PROGRESS NOTES
Repeat labs showing K 6.0, repeat Hgb 7.7 G/dl  - IV KCL and oral KCL stopped by primary team  - No need for KAYCEE BANERJEE [facility]- discussed with Dr. Jason Ramirez  - Continue with current CCPD prescription  -  EPO increase earlier 20K  - continue with IVF NS @ 50 cc/hr

## 2023-10-17 NOTE — PROGRESS NOTES
Physical therapy:    Chart reviewed, evaluation attempted. Pt off the floor in endoscopy. Will defer and continue to follow. Thank you.     Arcadio Hernandez, PT, DPT

## 2023-10-17 NOTE — PROGRESS NOTES
[facility]   [address], [address] [facility], [address] [facility]  Phone: [phone]   [phone]    [email or web address]     Nephrology Progress Note    Patient Name : Ciara Antunez      : 1955     MRN : [id number]  Date of Admission : 10/12/2023  Date of Servive : 10/17/23    CC: Follow up for ESRD       Assessment and Plan   ESRD on PD:  - CCPD nightly at home, patient of Dr. Lauren Gamez clinic  - Home Rx: 5X 2300 mL, 9.5-hour treatment time, 1000 mL icodextrin last fill  - Continue with current CCPD orders  - reheck K post procedure  - continue with IVF with KCL until repeat labs drawn  - may need heparin in PD fluid with fibrin present    Abd pain:  -No evidence of peritonitis  - 10/13 cell count normal     Anemia of CKD:  - Hgb 6.9  - EGD today  - transfuse for hgb <7  -  LIS increased 20K MWF    Hypokalemia:  -K hemolyzed this AM, requested repeat     Hypertension:  - BP stable    CAD s/p CABG 23     DM  COPD  HLD     Interval History:  Patient seen and examined this AM. Plans for EGD today. Per PD nurse she had increase fibrin in her fluid- will await EGD results prior to adding heparin to PD fluid with low hgb. UF (+) 148 ml. Review of Systems: Pertinent items are noted in HPI.     Current Medications:   Current Facility-Administered Medications   Medication Dose Route Frequency    0.9% NaCl with KCl 40 mEq infusion   IntraVENous Continuous    0.9 % sodium chloride infusion   IntraVENous Continuous    pantoprazole (PROTONIX) tablet 40 mg  40 mg Oral BID AC    albuterol sulfate HFA (PROVENTIL;VENTOLIN;PROAIR) 108 (90 Base) MCG/ACT inhaler 2 puff  2 puff Inhalation Q6H PRN    metoclopramide (REGLAN) injection 5 mg  5 mg IntraVENous TID AC    dextrose bolus 10% 125 mL  125 mL IntraVENous PRN    Or    dextrose bolus 10% 250 mL  250 mL IntraVENous PRN    glucagon injection 1 mg  1 mg SubCUTAneous PRN    dextrose 10 % infusion   IntraVENous Continuous PRN social history from Consult note.       DAYA Kelley - NP  [facility]

## 2023-10-17 NOTE — ANESTHESIA POSTPROCEDURE EVALUATION
Department of Anesthesiology  Postprocedure Note    Patient: Seth Levy  MRN: [id number]  YOB: 1955  Date of evaluation: 10/17/2023      Procedure Summary     Date: 10/17/23 Room / Location: [place] [address] / [facility] ENDOSCOPY    Anesthesia Start: 0935 Anesthesia Stop: 5510    Procedure: EGD ESOPHAGOGASTRODUODENOSCOPY (Upper GI Region) Diagnosis:       Abdominal pain, unspecified abdominal location      (Abdominal pain, unspecified abdominal location [R10.9])    Surgeons: Marina Orta MD Responsible Provider: Magalis Romero MD    Anesthesia Type: MAC ASA Status: 3          Anesthesia Type: MAC    Tati Phase I: Tati Score: 10    Tati Phase II: Tati Score: 10      Anesthesia Post Evaluation    Patient location during evaluation: PACU  Patient participation: complete - patient participated  Level of consciousness: awake  Pain score: 2  Airway patency: patent  Nausea & Vomiting: no nausea  Complications: no  Cardiovascular status: blood pressure returned to baseline  Respiratory status: acceptable  Hydration status: euvolemic  Pain management: adequate

## 2023-10-17 NOTE — PROGRESS NOTES
2387 - Messaged MD due to patient hemoglobin being 6.9 and patient morning BG being 65. MD aware. Followed PRN orders for low BG and patient being NPO.     6595 - Patient BG rechecked and was 141.     1016 - Messaged MD due to patient having orders for NS and 0.9 NaCl w/ KCL 40 Meq. Per MD give patient NS.     4689 - Patient BG 60/62. Md notified. Patient given juice and crackers. 1133 - Rechecked patient BG and it was 60. Md at bedside. Per MD verbal order give patient dextrose bolus 10 % 125 ml bag.     1214 - patient BG checked and was 115.     1546 - Patient labs resulted Md notified of Potassium being 6.0 and recheck hemoglobin being 7.7.  Fluid orders placed

## 2023-10-17 NOTE — PLAN OF CARE
Problem: Discharge Planning  Goal: Discharge to home or other facility with appropriate resources  Outcome: Progressing     Problem: Safety - Adult  Goal: Free from fall injury  Outcome: Progressing     Problem: Chronic Conditions and Co-morbidities  Goal: Patient's chronic conditions and co-morbidity symptoms are monitored and maintained or improved  Outcome: Progressing     Problem: Skin/Tissue Integrity  Goal: Absence of new skin breakdown  Description: 1. Monitor for areas of redness and/or skin breakdown  2. Assess vascular access sites hourly  3. Every 4-6 hours minimum:  Change oxygen saturation probe site  4. Every 4-6 hours:  If on nasal continuous positive airway pressure, respiratory therapy assess nares and determine need for appliance change or resting period.   Outcome: Progressing     Problem: Pain  Goal: Verbalizes/displays adequate comfort level or baseline comfort level  Outcome: Progressing  Flowsheets (Taken 10/16/2023 2223 by Savannah Boucher RN)  Verbalizes/displays adequate comfort level or baseline comfort level:   Encourage patient to monitor pain and request assistance   Assess pain using appropriate pain scale   Administer analgesics based on type and severity of pain and evaluate response   Implement non-pharmacological measures as appropriate and evaluate response   Consider cultural and social influences on pain and pain management   Notify Licensed Independent Practitioner if interventions unsuccessful or patient reports new pain     Problem: Nutrition Deficit:  Goal: Optimize nutritional status  Outcome: Progressing

## 2023-10-17 NOTE — BRIEF OP NOTE
TRANSFER - OUT REPORT:    Verbal report given to miguel vivar on [employer]  being transferred to [address] [place] [address] for routine post-op       Report consisted of patient's Situation, Background, Assessment and   Recommendations(SBAR). Information from the following report(s) Nurse Handoff Report was reviewed with the receiving nurse. Lines:   Peripheral IV 10/16/23 Left;Proximal;Anterior Forearm (Active)   Site Assessment Clean, dry & intact 10/16/23 1600   Line Status Infusing 10/16/23 1600   Line Care Connections checked and tightened 10/16/23 1600   Phlebitis Assessment No symptoms 10/16/23 1600   Infiltration Assessment 0 10/16/23 1600   Alcohol Cap Used Yes 10/16/23 1600   Dressing Status Clean, dry & intact 10/16/23 1600   Dressing Type Transparent 10/16/23 1600        Opportunity for questions and clarification was provided.       Patient transported with:  [employer]

## 2023-10-17 NOTE — PROGRESS NOTES
Hospitalist Progress Note  Bronwyn Engel MD  Answering service: [phone]        Date of Service:  10/17/2023  NAME:  Jose Dodd  :  1955  MRN:  [id number]      Admission Summary:   Jose Dodd is a 70 y.o. female with hx of cad s/p cabg, esrd on PD, chronic recurrent nausea, vomiting and abdominal pain, hfref who presented to ed with complaints of abdominal pain. Patient states she has had constant, aching, generalized abdominal pain for the last week and a half. States symptoms were present at time of discharge from hospital.  She was recently admitted and underwent CABG. She endorses aching sternal pain which she states is unchanged since her surgical procedure. She has had 1 episode of nonbloody, nonbilious emesis. Endorses malaise, fatigue and some anorexia due to her aching abdominal pain. Patient was just admitted from  to 10/6 for syncopal, UTI ,hyponatremia and similar abd pain, vomiting, nausea. The patient denies any fever, chills, chest pain, cough, congestion, recent illness, palpitations, or dysuria/urgency or frequency. Remarkable vitals on ER Presentation: vss  Labs Remarkable for: lactic acid 2.9, cr 7.71, hgb 7.5, UA: lg bld, wbc, 1+ bact  ER Images: ct chest abd et pelvis: no acute process  ER Rx: asa 324, cefepime, cymbalta   CT ABDOMEN PELVIS WO CONTRAST Additional Contrast? None    Result Date: 10/13/2023  1. No acute pathology in the chest, abdomen, or pelvis. 2.  Small pulmonary nodules. Consider follow-up chest CT in one year to patient's history of smoking. 3.  Peritoneal catheter in place with free fluid in the abdomen and pelvis. 4.  Small stones or sludge in gallbladder. CT CHEST WO CONTRAST    Result Date: 10/13/2023  1. No acute pathology in the chest, abdomen, or pelvis. 2.  Small pulmonary nodules.  Consider follow-up chest CT in one year to patient's history hypoglycemia  Dc lantus and monitor      COPD  -stable. Duonebs prn     Heels and buttocks wound: POA,   Wound care consulted: plan discussed  venelex twice daily; cover sacrum with foam     Code status: FULL   Prophylaxis: SC HEPARIN (HOLD DUE TO [address]) scd   Care Plan discussed with: pt, rn, renal. Daughter updated bedside   Anticipated Disposition: home when better              Review of Systems:   Pertinent items are noted in HPI. Vital Signs:    Last 24hrs VS reviewed since prior progress note. Most recent are:  Vitals:    10/17/23 1243   BP:    Pulse: (!) 108   Resp:    Temp:    SpO2:          Intake/Output Summary (Last 24 hours) at 10/17/2023 1338  Last data filed at 10/17/2023 0948  Gross per 24 hour   Intake 298 ml   Output --   Net 298 ml          Physical Examination:     I had a face to face encounter with this patient and independently examined them on 10/17/2023 as outlined below:          General:  Alert, cooperative, no distress, appears stated age. Head:  Normocephalic, without obvious abnormality, atraumatic. Eyes:  Conjunctivae/corneas clear. PERRL, EOMs intact. Nose: Nares normal. Septum midline. Mucosa normal.          Neck: Supple, symmetrical, trachea midline. Lungs:   Clear to auscultation bilaterally. Chest wall:  Appropriate mild sternal tenderness   Heart:  Regular rate and rhythm, S1, S2 normal   Abdomen:   Soft, non-tender. Bowel sounds normal. No masses,  No organomegaly. Peritoneal dialysis catheter in place   Extremities: Extremities normal, atraumatic, no cyanosis or edema. Pulses: 2+ and symmetric all extremities. Skin: Skin color, texture, turgor normal. No rashes or lesions   Neurologic: CNII-XII grossly intact.                Data Review:    Review and/or order of clinical lab test    I have independently reviewed and interpreted patient's lab and all other diagnostic data    Notes reviewed from all clinical/nonclinical/nursing services involved in

## 2023-10-17 NOTE — PROGRESS NOTES

## 2023-10-17 NOTE — PLAN OF CARE
Problem: Physical Therapy - Adult  Goal: By Discharge: Performs mobility at highest level of function for planned discharge setting. See evaluation for individualized goals. Description: FUNCTIONAL STATUS PRIOR TO ADMISSION: Patient was modified independent using a rollator for functional mobility. Pt reported 2-3 syncopal episodes at home    [address] [facility]: The patient lived with family and required assistance for some ADLs and mobility. Physical Therapy Goals  Initiated 10/17/2023  1. Patient will move from supine to sit and sit to supine and roll side to side in bed with modified independence within 7 day(s). 2.  Patient will perform sit to stand with supervision/set-up within 7 day(s). 3.  Patient will transfer from bed to chair and chair to bed with supervision/set-up using the least restrictive device within 7 day(s). 4.  Patient will ambulate with supervision/set-up for 50 feet with the least restrictive device within 7 day(s). 5.  Patient will ascend/descend 3 stairs with single handrail(s) with supervision/set-up within 7 day(s). Outcome: Progressing   PHYSICAL THERAPY EVALUATION    Patient: Eneida Boston (6 y.o. female)  Date: 10/17/2023  Primary Diagnosis: Abdominal pain, epigastric [R10.13]  ESRD (end stage renal disease) ([address]) [N18.6]  Elevated troponin [P25.69]  Complicated UTI (urinary tract infection) [N39.0]  Procedure(s) (LRB):  EGD ESOPHAGOGASTRODUODENOSCOPY (N/A) Day of Surgery   Precautions: Fall Risk (Orthostatic hypotensive, move in the tube (recent CABG))                    ASSESSMENT :   DEFICITS/IMPAIRMENTS:   The patient is limited by decreased functional mobility, activity tolerance, balance, orthostatic hypotension, gait s/p admission on 10/12 with nausea, vomiting, and abdominal pain. Pt underwent EGD 10/17.  Pt with recent CABG ~ 1.5 months ago       Based on the impairments listed above pt required CGA-min A for functional transfers and side stepping

## 2023-10-17 NOTE — BRIEF OP NOTE
TRANSFER - IN REPORT:    Verbal report received from miguel vivar on [employer]  being received from [phone] for ordered procedure      Report consisted of patient's Situation, Background, Assessment and   Recommendations(SBAR). Information from the following report(s) Nurse Handoff Report was reviewed with the receiving nurse. Opportunity for questions and clarification was provided. Assessment completed upon patient's arrival to unit and care assumed.

## 2023-10-17 NOTE — ANESTHESIA PRE PROCEDURE
Department of Anesthesiology  Preprocedure Note       Name:  Nubia Espinoza   Age:  70 y.o.  :  1955                                          MRN:  [id number]         Date:  10/17/2023      Surgeon: Terry Mckeon):  Anatoly Ortgea MD    Procedure: Procedure(s):  EGD ESOPHAGOGASTRODUODENOSCOPY    Medications prior to admission:   Prior to Admission medications    Medication Sig Start Date End Date Taking? Authorizing Provider   oxyCODONE (ROXICODONE) 5 MG immediate release tablet Take 5 mg by mouth every 6 hours as needed for Pain. for moderate to severe pain rating of 6 or greater    Provider, MD Shane   ondansetron (ZOFRAN-ODT) 4 MG disintegrating tablet Take 1 tablet by mouth 3 times daily as needed for Nausea or Vomiting 23   DAYA Morris - NP   acetaminophen (TYLENOL) 325 MG tablet Take 3 tablets by mouth in the morning and 3 tablets at noon and 3 tablets in the evening and 3 tablets before bedtime. Patient taking differently: Take 3 tablets by mouth every 6 hours. for mild to moderate pain rating of 5 or less   23   DAYA Hercules - NP   aspirin 81 MG chewable tablet Take 1 tablet by mouth daily 9/15/23   DAYA Hercules NP   amiodarone (CORDARONE) 200 MG tablet Take 2 tablets by mouth 2 times daily for 14 days, THEN 2 tablets daily for 14 days.  9/14/23 10/12/23  DAYA Hercules NP   carvedilol (COREG) 6.25 MG tablet Take 1 tablet by mouth 2 times daily (with meals) Hold for HR < 60 or SBP < 110 23   Gardner, Veva Fothergill, APRN - NP   melatonin 3 MG TABS tablet Take 2 tablets by mouth nightly as needed (insomnia) 23   DAYA Hercules NP   magnesium oxide (MAG-OX) 400 (240 Mg) MG tablet Take 1 tablet by mouth 2 times daily 9/14/23 10/14/23  DAYA Hercules NP   pantoprazole (PROTONIX) 40 MG tablet Take 1 tablet by mouth daily 9/15/23   DAYA Hercules NP   Blood Glucose Monitoring Suppl (BLOOD GLUCOSE MONITOR SYSTEM) w/Device KIT 1

## 2023-10-18 ENCOUNTER — APPOINTMENT (OUTPATIENT)
Facility: HOSPITAL | Age: 68
DRG: 073 | End: 2023-10-18
Payer: MEDICARE

## 2023-10-18 LAB
ALBUMIN SERPL-MCNC: 1.8 G/DL (ref 3.5–5)
ANION GAP SERPL CALC-SCNC: 7 MMOL/L (ref 5–15)
BASOPHILS # BLD: 0 K/UL (ref 0–0.1)
BASOPHILS NFR BLD: 1 % (ref 0–1)
BUN SERPL-MCNC: 28 MG/DL (ref 6–20)
BUN/CREAT SERPL: 4 (ref 12–20)
CALCIUM SERPL-MCNC: 7.7 MG/DL (ref 8.5–10.1)
CHLORIDE SERPL-SCNC: 102 MMOL/L (ref 97–108)
CO2 SERPL-SCNC: 26 MMOL/L (ref 21–32)
CREAT SERPL-MCNC: 6.38 MG/DL (ref 0.55–1.02)
DIFFERENTIAL METHOD BLD: ABNORMAL
EOSINOPHIL # BLD: 0.2 K/UL (ref 0–0.4)
EOSINOPHIL NFR BLD: 4 % (ref 0–7)
ERYTHROCYTE [DISTWIDTH] IN BLOOD BY AUTOMATED COUNT: 17.2 % (ref 11.5–14.5)
GLUCOSE BLD STRIP.AUTO-MCNC: 119 MG/DL (ref 65–117)
GLUCOSE BLD STRIP.AUTO-MCNC: 163 MG/DL (ref 65–117)
GLUCOSE BLD STRIP.AUTO-MCNC: 197 MG/DL (ref 65–117)
GLUCOSE BLD STRIP.AUTO-MCNC: 198 MG/DL (ref 65–117)
GLUCOSE SERPL-MCNC: 137 MG/DL (ref 65–100)
HCT VFR BLD AUTO: 23.4 % (ref 35–47)
HGB BLD-MCNC: 7.1 G/DL (ref 11.5–16)
HISTORY CHECK: NORMAL
IMM GRANULOCYTES # BLD AUTO: 0.1 K/UL (ref 0–0.04)
IMM GRANULOCYTES NFR BLD AUTO: 1 % (ref 0–0.5)
LYMPHOCYTES # BLD: 1.5 K/UL (ref 0.8–3.5)
LYMPHOCYTES NFR BLD: 26 % (ref 12–49)
MCH RBC QN AUTO: 28.9 PG (ref 26–34)
MCHC RBC AUTO-ENTMCNC: 30.3 G/DL (ref 30–36.5)
MCV RBC AUTO: 95.1 FL (ref 80–99)
MONOCYTES # BLD: 0.4 K/UL (ref 0–1)
MONOCYTES NFR BLD: 8 % (ref 5–13)
NEUTS SEG # BLD: 3.5 K/UL (ref 1.8–8)
NEUTS SEG NFR BLD: 60 % (ref 32–75)
NRBC # BLD: 0 K/UL (ref 0–0.01)
NRBC BLD-RTO: 0 PER 100 WBC
PHOSPHATE SERPL-MCNC: 2.6 MG/DL (ref 2.6–4.7)
PLATELET # BLD AUTO: 213 K/UL (ref 150–400)
PMV BLD AUTO: 10.4 FL (ref 8.9–12.9)
POTASSIUM SERPL-SCNC: 5 MMOL/L (ref 3.5–5.1)
RBC # BLD AUTO: 2.46 M/UL (ref 3.8–5.2)
SERVICE CMNT-IMP: ABNORMAL
SODIUM SERPL-SCNC: 135 MMOL/L (ref 136–145)
WBC # BLD AUTO: 5.7 K/UL (ref 3.6–11)

## 2023-10-18 PROCEDURE — 2580000003 HC RX 258: Performed by: HOSPITALIST

## 2023-10-18 PROCEDURE — 97535 SELF CARE MNGMENT TRAINING: CPT

## 2023-10-18 PROCEDURE — 36430 TRANSFUSION BLD/BLD COMPNT: CPT

## 2023-10-18 PROCEDURE — 51798 US URINE CAPACITY MEASURE: CPT

## 2023-10-18 PROCEDURE — 80069 RENAL FUNCTION PANEL: CPT

## 2023-10-18 PROCEDURE — 85025 COMPLETE CBC W/AUTO DIFF WBC: CPT

## 2023-10-18 PROCEDURE — 71045 X-RAY EXAM CHEST 1 VIEW: CPT

## 2023-10-18 PROCEDURE — 2060000000 HC ICU INTERMEDIATE R&B

## 2023-10-18 PROCEDURE — P9016 RBC LEUKOCYTES REDUCED: HCPCS

## 2023-10-18 PROCEDURE — 97530 THERAPEUTIC ACTIVITIES: CPT

## 2023-10-18 PROCEDURE — 82962 GLUCOSE BLOOD TEST: CPT

## 2023-10-18 PROCEDURE — 99231 SBSQ HOSP IP/OBS SF/LOW 25: CPT | Performed by: NURSE PRACTITIONER

## 2023-10-18 PROCEDURE — 6360000002 HC RX W HCPCS: Performed by: HOSPITALIST

## 2023-10-18 PROCEDURE — 36415 COLL VENOUS BLD VENIPUNCTURE: CPT

## 2023-10-18 PROCEDURE — 2580000003 HC RX 258: Performed by: INTERNAL MEDICINE

## 2023-10-18 PROCEDURE — 6370000000 HC RX 637 (ALT 250 FOR IP): Performed by: FAMILY MEDICINE

## 2023-10-18 PROCEDURE — 6370000000 HC RX 637 (ALT 250 FOR IP): Performed by: HOSPITALIST

## 2023-10-18 PROCEDURE — 6360000002 HC RX W HCPCS: Performed by: NURSE PRACTITIONER

## 2023-10-18 PROCEDURE — 2580000003 HC RX 258: Performed by: FAMILY MEDICINE

## 2023-10-18 RX ORDER — SODIUM CHLORIDE 9 MG/ML
INJECTION, SOLUTION INTRAVENOUS PRN
Status: DISCONTINUED | OUTPATIENT
Start: 2023-10-18 | End: 2023-10-21 | Stop reason: HOSPADM

## 2023-10-18 RX ORDER — BENZONATATE 100 MG/1
100 CAPSULE ORAL 3 TIMES DAILY
Status: DISCONTINUED | OUTPATIENT
Start: 2023-10-18 | End: 2023-10-21 | Stop reason: HOSPADM

## 2023-10-18 RX ORDER — MIDODRINE HYDROCHLORIDE 5 MG/1
5 TABLET ORAL
Status: DISCONTINUED | OUTPATIENT
Start: 2023-10-18 | End: 2023-10-19

## 2023-10-18 RX ORDER — GUAIFENESIN 600 MG/1
600 TABLET, EXTENDED RELEASE ORAL 2 TIMES DAILY
Status: CANCELLED | OUTPATIENT
Start: 2023-10-18

## 2023-10-18 RX ADMIN — ONDANSETRON 4 MG: 2 INJECTION INTRAMUSCULAR; INTRAVENOUS at 22:52

## 2023-10-18 RX ADMIN — OXYCODONE HYDROCHLORIDE 5 MG: 5 TABLET ORAL at 08:44

## 2023-10-18 RX ADMIN — MONTELUKAST 10 MG: 10 TABLET, FILM COATED ORAL at 20:51

## 2023-10-18 RX ADMIN — SODIUM CHLORIDE, PRESERVATIVE FREE 5 ML: 5 INJECTION INTRAVENOUS at 23:10

## 2023-10-18 RX ADMIN — ACETAMINOPHEN 650 MG: 325 TABLET ORAL at 20:51

## 2023-10-18 RX ADMIN — METOCLOPRAMIDE 5 MG: 5 INJECTION, SOLUTION INTRAMUSCULAR; INTRAVENOUS at 12:12

## 2023-10-18 RX ADMIN — ROSUVASTATIN CALCIUM 40 MG: 10 TABLET, FILM COATED ORAL at 08:45

## 2023-10-18 RX ADMIN — DULOXETINE HYDROCHLORIDE 20 MG: 20 CAPSULE, DELAYED RELEASE ORAL at 20:51

## 2023-10-18 RX ADMIN — METHOCARBAMOL 500 MG: 500 TABLET ORAL at 20:51

## 2023-10-18 RX ADMIN — PANTOPRAZOLE SODIUM 40 MG: 40 TABLET, DELAYED RELEASE ORAL at 07:13

## 2023-10-18 RX ADMIN — Medication: at 08:46

## 2023-10-18 RX ADMIN — Medication 6 MG: at 20:51

## 2023-10-18 RX ADMIN — PANTOPRAZOLE SODIUM 40 MG: 40 TABLET, DELAYED RELEASE ORAL at 16:47

## 2023-10-18 RX ADMIN — Medication: at 23:00

## 2023-10-18 RX ADMIN — DULOXETINE HYDROCHLORIDE 20 MG: 20 CAPSULE, DELAYED RELEASE ORAL at 08:45

## 2023-10-18 RX ADMIN — GENTAMICIN SULFATE: 1 CREAM TOPICAL at 14:57

## 2023-10-18 RX ADMIN — METOCLOPRAMIDE 5 MG: 5 INJECTION, SOLUTION INTRAMUSCULAR; INTRAVENOUS at 07:12

## 2023-10-18 RX ADMIN — Medication 1 MG: at 08:45

## 2023-10-18 RX ADMIN — OXYCODONE HYDROCHLORIDE 5 MG: 5 TABLET ORAL at 16:48

## 2023-10-18 RX ADMIN — SODIUM CHLORIDE, PRESERVATIVE FREE 10 ML: 5 INJECTION INTRAVENOUS at 08:47

## 2023-10-18 RX ADMIN — EPOETIN ALFA-EPBX 20000 UNITS: 20000 INJECTION, SOLUTION INTRAVENOUS; SUBCUTANEOUS at 16:49

## 2023-10-18 RX ADMIN — BENZONATATE 100 MG: 100 CAPSULE ORAL at 16:47

## 2023-10-18 RX ADMIN — SODIUM CHLORIDE, SODIUM LACTATE, CALCIUM CHLORIDE, MAGNESIUM CHLORIDE AND DEXTROSE 6000 ML: 1.5; 538; 448; 18.3; 5.08 INJECTION, SOLUTION INTRAPERITONEAL at 14:57

## 2023-10-18 RX ADMIN — SODIUM CHLORIDE: 9 INJECTION, SOLUTION INTRAVENOUS at 08:42

## 2023-10-18 RX ADMIN — METOCLOPRAMIDE 5 MG: 5 INJECTION, SOLUTION INTRAMUSCULAR; INTRAVENOUS at 16:47

## 2023-10-18 RX ADMIN — BENZONATATE 100 MG: 100 CAPSULE ORAL at 20:51

## 2023-10-18 RX ADMIN — GENTAMICIN SULFATE: 1 CREAM TOPICAL at 08:46

## 2023-10-18 ASSESSMENT — PAIN DESCRIPTION - DESCRIPTORS
DESCRIPTORS: ACHING
DESCRIPTORS: ACHING

## 2023-10-18 ASSESSMENT — PAIN DESCRIPTION - LOCATION
LOCATION: ARM
LOCATION: ABDOMEN
LOCATION: ARM
LOCATION: ABDOMEN

## 2023-10-18 ASSESSMENT — PAIN DESCRIPTION - ORIENTATION
ORIENTATION: LEFT
ORIENTATION: ANTERIOR

## 2023-10-18 ASSESSMENT — PAIN SCALES - GENERAL
PAINLEVEL_OUTOF10: 8
PAINLEVEL_OUTOF10: 4
PAINLEVEL_OUTOF10: 5
PAINLEVEL_OUTOF10: 8

## 2023-10-18 NOTE — PROGRESS NOTES
Hospitalist Progress Note  Tito Olmedo MD  Answering service: [phone]        Date of Service:  10/18/2023  NAME:  Bertha Fitzgerald  :  1955  MRN:  [id number]      Admission Summary:   Bertha Fitzgerald is a 70 y.o. female with hx of cad s/p cabg, esrd on PD, chronic recurrent nausea, vomiting and abdominal pain, hfref who presented to ed with complaints of abdominal pain. Patient states she has had constant, aching, generalized abdominal pain for the last week and a half. States symptoms were present at time of discharge from hospital.  She was recently admitted and underwent CABG. She endorses aching sternal pain which she states is unchanged since her surgical procedure. She has had 1 episode of nonbloody, nonbilious emesis. Endorses malaise, fatigue and some anorexia due to her aching abdominal pain. Patient was just admitted from  to 10/6 for syncopal, UTI ,hyponatremia and similar abd pain, vomiting, nausea. The patient denies any fever, chills, chest pain, cough, congestion, recent illness, palpitations, or dysuria/urgency or frequency. Remarkable vitals on ER Presentation: vss  Labs Remarkable for: lactic acid 2.9, cr 7.71, hgb 7.5, UA: lg bld, wbc, 1+ bact  ER Images: ct chest abd et pelvis: no acute process  ER Rx: asa 324, cefepime, cymbalta   CT ABDOMEN PELVIS WO CONTRAST Additional Contrast? None    Result Date: 10/13/2023  1. No acute pathology in the chest, abdomen, or pelvis. 2.  Small pulmonary nodules. Consider follow-up chest CT in one year to patient's history of smoking. 3.  Peritoneal catheter in place with free fluid in the abdomen and pelvis. 4.  Small stones or sludge in gallbladder. CT CHEST WO CONTRAST    Result Date: 10/13/2023  1. No acute pathology in the chest, abdomen, or pelvis. 2.  Small pulmonary nodules.  Consider follow-up chest CT in one year to patient's history agreed. Repeat Hb 7.1, given her hypotension, and renal failure pt, should be benefit  from transfusion      HTN  -low labile bps on ed presentation  -continue hold bp meds  Had recurrent syncopal due to orthostatic hypotension       NIDDM II  -Lantus 20u  -SSI +Hypoglycemic protocols  -noticed hypoglycemia  Dc lantus and monitor      COPD  -stable. Duonebs prn     Heels and buttocks wound: POA,   Wound care consulted: plan discussed  venelex twice daily; cover sacrum with foam     Code status: FULL   Prophylaxis: SC HEPARIN (HOLD DUE TO [address]) scd   Care Plan discussed with: pt, rn, renal. Daughter updated bedside   Anticipated Disposition: home when better              Review of Systems:   Pertinent items are noted in HPI. Vital Signs:    Last 24hrs VS reviewed since prior progress note. Most recent are:  Vitals:    10/18/23 1337   BP: (!) 142/72   Pulse: (!) 107   Resp:    Temp:    SpO2:          Intake/Output Summary (Last 24 hours) at 10/18/2023 1357  Last data filed at 10/18/2023 0630  Gross per 24 hour   Intake --   Output 247 ml   Net -247 ml          Physical Examination:     I had a face to face encounter with this patient and independently examined them on 10/18/2023 as outlined below:          General:  Alert, cooperative, no distress, appears stated age. Head:  Normocephalic, without obvious abnormality, atraumatic. Eyes:  Conjunctivae/corneas clear. PERRL, EOMs intact. Nose: Nares normal. Septum midline. Mucosa normal.          Neck: Supple, symmetrical, trachea midline. Lungs:   Clear to auscultation bilaterally. Chest wall:  Appropriate mild sternal tenderness   Heart:  Regular rate and rhythm, S1, S2 normal   Abdomen:   Soft, non-tender. Bowel sounds normal. No masses,  No organomegaly. Peritoneal dialysis catheter in place   Extremities: Extremities normal, atraumatic, no cyanosis or edema. Pulses: 2+ and symmetric all extremities.    Skin: Skin color, texture, turgor

## 2023-10-18 NOTE — PLAN OF CARE
Problem: Occupational Therapy - Adult  Goal: By Discharge: Performs self-care activities at highest level of function for planned discharge setting. See evaluation for individualized goals. Description: FUNCTIONAL STATUS PRIOR TO ADMISSION:    Receives Help From: Family, ADL Assistance: Needs assistance,  ,  ,  ,  ,  , Homemaking Assistance: Needs assistance, Ambulation Assistance: Needs assistance, Transfer Assistance: Needs assistance, Active : No     HOME SUPPORT: Patient lived with multiple family members who she reported had been assisting more often since CABG particularly with toileting and bathing,had been working with home health 3x/weekly. Occupational Therapy Goals:  Initiated 10/17/2023  1. Patient will perform grooming in standing for 2 minutes without LOB or orthostatic drop with  Contact Guard Assist within 7 day(s). 2.  Patient will perform lower body dressing with AE PRN with Minimal Assist within 7 day(s). 3.  Patient will perform toileting with AE PRN Minimal Assist within 7 day(s). 4.  Patient will perform toilet transfers with Contact Guard Assist  within 7 day(s). 5.  Patient will recall 3/3 sternal precautions and \"move in tube\" with functional transfers 100% of the time with verbal cues and SBA  within 7 day(s). 6.  Patient will participate in sternal precaution compliant upper extremity therapeutic exercise/activities with Supervision for 8 minutes within 7 day(s). 7.  Patient will utilize energy conservation techniques during functional activities with verbal and visual cues within 7 day(s).     Outcome: Progressing   OCCUPATIONAL THERAPY TREATMENT  Patient: Chinyere Escobar (84 y.o. female)  Date: 10/18/2023  Primary Diagnosis: Abdominal pain, epigastric [R10.13]  ESRD (end stage renal disease) ([address]) [N18.6]  Elevated troponin [Y51.42]  Complicated UTI (urinary tract infection) [N39.0]  Procedure(s) (LRB):  EGD ESOPHAGOGASTRODUODENOSCOPY (N/A) 1 Day Post-Op   Precautions: reach, and Bed/ chair alarm activated    COMMUNICATION/EDUCATION:   The patient's plan of care was discussed with: physical therapist and registered nurse    Patient Education  Education Given To: Patient  Education Provided: Role of Therapy;Precautions;Transfer Training;Energy Conservation; Fall Prevention Strategies; ADL Adaptive Strategies  Education Provided Comments: reminders for sternal percautions with mobility  Education Method: Demonstration;Verbal;Teach Back  Barriers to Learning: None  Education Outcome: Verbalized understanding;Demonstrated understanding    Thank you for this referral.  Rolan Hamilton OT  Minutes: 23

## 2023-10-18 NOTE — CONSENT
Informed Consent for Blood Component Transfusion Note    I have discussed with the patient the rationale for blood component transfusion; its benefits in treating or preventing fatigue, organ damage, or death; and its risk which includes mild transfusion reactions, rare risk of blood borne infection, or more serious but rare reactions. I have discussed the alternatives to transfusion, including the risk and consequences of not receiving transfusion. The patient had an opportunity to ask questions and had agreed to proceed with transfusion of blood components.     Electronically signed by Chente Sadler MD on 10/18/23 at 2:03 PM EDT

## 2023-10-18 NOTE — PROGRESS NOTES
[facility]   [address], [address] [facility], [address] [facility]  Phone: [phone]   [phone]    [email or web address]     Nephrology Progress Note    Patient Name : Nora Parada      : 1955     MRN : [id number]  Date of Admission : 10/12/2023  Date of Servive : 10/18/23    CC: Follow up for ESRD       Assessment and Plan   ESRD on PD:  - CCPD nightly at home, patient of Dr[facility] Ryl[facility]  - Home Rx: 5X 2300 mL, 9.5-hour treatment time, 1000 mL icodextrin last fill  - Continue with current CCPD orders 1.5% solutions  - d/c IVF for now  - repeat CXR ordered- No pulmonary edema    Abd pain:  -No evidence of peritonitis  - 10/13 cell count normal     Anemia of CKD:  - Hgb 7.1  - EGD (-) for active bleed- biopsy pending  - transfuse for hgb <7  -  LIS 20K MWF    Hypokalemia-> hyperkalemia  -Repeat K stable- no supplements     Hypertension:  - BP stable    Left breast discomfort  - primary team to evaluation     CAD s/p CABG 23  DM  COPD  HLD     Interval History:  Patient seen and examined this AM. No further nausea or vomiting. Has cough this AM- IVF d/c'd . Encourage oral intake. She c/o tenderness with left breast -states it has been going on prior to admission. Net  required repositioning for good drain. Review of Systems: Pertinent items are noted in HPI.     Current Medications:   Current Facility-Administered Medications   Medication Dose Route Frequency    Epoetin Juan Francisco-epbx (RETACRIT) injection 20,000 Units  20,000 Units SubCUTAneous Once per day on     pantoprazole (PROTONIX) tablet 40 mg  40 mg Oral BID AC    albuterol sulfate HFA (PROVENTIL;VENTOLIN;PROAIR) 108 (90 Base) MCG/ACT inhaler 2 puff  2 puff Inhalation Q6H PRN    metoclopramide (REGLAN) injection 5 mg  5 mg IntraVENous TID AC    dextrose bolus 10% 125 mL  125 mL IntraVENous PRN    Or    dextrose bolus 10% 250 mL  250 mL IntraVENous PRN    glucagon injection 1 mg  1 mg

## 2023-10-18 NOTE — PROGRESS NOTES
metoclopramide (REGLAN) injection 5 mg  5 mg IntraVENous TID AC    dextrose bolus 10% 125 mL  125 mL IntraVENous PRN    Or    dextrose bolus 10% 250 mL  250 mL IntraVENous PRN    glucagon injection 1 mg  1 mg SubCUTAneous PRN    dextrose 10 % infusion   IntraVENous Continuous PRN    insulin lispro (HUMALOG) injection vial 0-8 Units  0-8 Units SubCUTAneous TID WC    insulin lispro (HUMALOG) injection vial 0-4 Units  0-4 Units SubCUTAneous Nightly    sodium chloride flush 0.9 % injection 5-40 mL  5-40 mL IntraVENous 2 times per day    sodium chloride flush 0.9 % injection 5-40 mL  5-40 mL IntraVENous PRN    0.9 % sodium chloride infusion   IntraVENous PRN    acetaminophen (TYLENOL) tablet 650 mg  650 mg Oral Q6H PRN    Or    acetaminophen (TYLENOL) suppository 650 mg  650 mg Rectal Q6H PRN    DULoxetine (CYMBALTA) extended release capsule 20 mg  20 mg Oral BID    folic acid (FOLVITE) tablet 1 mg  1 mg Oral Daily    ipratropium 0.5 mg-albuterol 2.5 mg (DUONEB) nebulizer solution 1 Dose  1 Dose Inhalation Q6H PRN    melatonin tablet 6 mg  6 mg Oral Nightly PRN    montelukast (SINGULAIR) tablet 10 mg  10 mg Oral Nightly    methocarbamol (ROBAXIN) tablet 500 mg  500 mg Oral TID PRN    oxyCODONE (ROXICODONE) immediate release tablet 5 mg  5 mg Oral Q6H PRN    rosuvastatin (CRESTOR) tablet 40 mg  40 mg Oral Daily    ondansetron (ZOFRAN) injection 4 mg  4 mg IntraVENous Q6H PRN    balsum peru-castor oil (VENELEX) ointment   Topical BID    dianeal lo-brandi 1.5% 6,000 mL  6,000 mL IntraPERitoneal Daily before dinner    dianeal lo-brandi 1.5% 6,000 mL  6,000 mL IntraPERitoneal Daily before dinner    gentamicin (GARAMYCIN) 0.1 % cream   Topical Daily    HYDROmorphone HCl PF (DILAUDID) injection 0.25 mg  0.25 mg IntraVENous Q4H PRN       DAYA Benavides NP  10/18/2023  Attending addendum:  Patient seen and examined independently, agree with above note.      Doing well, tolerating diet, no further abdominal pain, will sign off for now please re-consult if any further issues.

## 2023-10-18 NOTE — PROGRESS NOTES
36 - Messaged MD due to patient having a metoprolol order. BP has been low last BP was 125/75. Per MD hold the medication. 1 - Messaged MD to verify that we are transfusion the patient. Per MD we are transfusing her. 1715 - Patient IV infiltrated, IV removed. Called pharmacy. Per pharmacy use warm compresses if needed for swelling. [phone] - Nurse and another RN unable to get IV.    1735 - CT at bedside to help with IV access. 1750 - New IV placed by CT    1744 - Patient vomiting/spitting up. Blood paused. Another nurse at bedside. Vitals taken. Per nurse patient may be having fluid overload, rate decrease from 125 ml/h to 75 ml/h. Patient tolerating.

## 2023-10-18 NOTE — PLAN OF CARE
Problem: Physical Therapy - Adult  Goal: By Discharge: Performs mobility at highest level of function for planned discharge setting. See evaluation for individualized goals. Description: FUNCTIONAL STATUS PRIOR TO ADMISSION: Patient was modified independent using a rollator for functional mobility. Pt reported 2-3 syncopal episodes at home    [address]: The patient lived with family and required assistance for some ADLs and mobility. Physical Therapy Goals  Initiated 10/17/2023  1. Patient will move from supine to sit and sit to supine and roll side to side in bed with modified independence within 7 day(s). 2.  Patient will perform sit to stand with supervision/set-up within 7 day(s). 3.  Patient will transfer from bed to chair and chair to bed with supervision/set-up using the least restrictive device within 7 day(s). 4.  Patient will ambulate with supervision/set-up for 50 feet with the least restrictive device within 7 day(s). 5.  Patient will ascend/descend 3 stairs with single handrail(s) with supervision/set-up within 7 day(s). Outcome: Progressing   PHYSICAL THERAPY TREATMENT    Patient: Nora Parada (34 y.o. female)  Date: 10/18/2023  Diagnosis: Abdominal pain, epigastric [R10.13]  ESRD (end stage renal disease) ([address]) [N18.6]  Elevated troponin [R63.98]  Complicated UTI (urinary tract infection) [N39.0] Elevated troponin  Procedure(s) (LRB):  EGD ESOPHAGOGASTRODUODENOSCOPY (N/A) 1 Day Post-Op  Precautions: Fall Risk (Orthostatic hypotensive, move in the tube (recent CABG))                    ASSESSMENT:  Patient continues to benefit from skilled PT services and is slowly progressing towards goals. Pt continues to be limited by orthostatic hypotension and decreased tolerance to activity. Pt completed transfers with CGA and tolerated static standing with CGA. Unable to progress mobility further due to BP.  Discussed with RN and MD. Pt will continue to benefit from PT to assistance  Balance:  Balance  Sitting: Intact  Standing: Impaired  Standing - Static: Good  Standing - Dynamic: Fair   Ambulation/Gait Training:        Neuro Re-Education:                    Pain Ratin/10   Pain Intervention(s):        Activity Tolerance:   Poor and signs and symptoms of orthostatic hypotension    After treatment:   Patient left in no apparent distress sitting up in chair, Call bell within reach, and Side rails x3      COMMUNICATION/EDUCATION:   The patient's plan of care was discussed with: occupational therapist, registered nurse, and physician    Patient Education  Education Given To: Patient  Education Provided: Role of Therapy;Plan of Care  Education Method: Verbal  Barriers to Learning: None  Education Outcome: Verbalized understanding      Robinson Thibodeaux PT, DPT  Minutes: 34

## 2023-10-19 LAB
ABO + RH BLD: NORMAL
ALBUMIN SERPL-MCNC: 1.8 G/DL (ref 3.5–5)
ANION GAP SERPL CALC-SCNC: 4 MMOL/L (ref 5–15)
BASOPHILS # BLD: 0.1 K/UL (ref 0–0.1)
BASOPHILS NFR BLD: 1 % (ref 0–1)
BLD PROD TYP BPU: NORMAL
BLOOD BANK DISPENSE STATUS: NORMAL
BLOOD GROUP ANTIBODIES SERPL: NORMAL
BPU ID: NORMAL
BUN SERPL-MCNC: 28 MG/DL (ref 6–20)
BUN/CREAT SERPL: 4 (ref 12–20)
CALCIUM SERPL-MCNC: 8.5 MG/DL (ref 8.5–10.1)
CHLORIDE SERPL-SCNC: 102 MMOL/L (ref 97–108)
CO2 SERPL-SCNC: 27 MMOL/L (ref 21–32)
COMMENT:: NORMAL
CREAT SERPL-MCNC: 6.31 MG/DL (ref 0.55–1.02)
CROSSMATCH RESULT: NORMAL
DIFFERENTIAL METHOD BLD: ABNORMAL
EOSINOPHIL # BLD: 0.2 K/UL (ref 0–0.4)
EOSINOPHIL NFR BLD: 3 % (ref 0–7)
ERYTHROCYTE [DISTWIDTH] IN BLOOD BY AUTOMATED COUNT: 17.8 % (ref 11.5–14.5)
GLUCOSE BLD STRIP.AUTO-MCNC: 106 MG/DL (ref 65–117)
GLUCOSE BLD STRIP.AUTO-MCNC: 128 MG/DL (ref 65–117)
GLUCOSE BLD STRIP.AUTO-MCNC: 141 MG/DL (ref 65–117)
GLUCOSE BLD STRIP.AUTO-MCNC: 155 MG/DL (ref 65–117)
GLUCOSE SERPL-MCNC: 151 MG/DL (ref 65–100)
HCT VFR BLD AUTO: 26.2 % (ref 35–47)
HGB BLD-MCNC: 8 G/DL (ref 11.5–16)
IMM GRANULOCYTES # BLD AUTO: 0 K/UL (ref 0–0.04)
IMM GRANULOCYTES NFR BLD AUTO: 1 % (ref 0–0.5)
LYMPHOCYTES # BLD: 1.6 K/UL (ref 0.8–3.5)
LYMPHOCYTES NFR BLD: 28 % (ref 12–49)
MCH RBC QN AUTO: 28.5 PG (ref 26–34)
MCHC RBC AUTO-ENTMCNC: 30.5 G/DL (ref 30–36.5)
MCV RBC AUTO: 93.2 FL (ref 80–99)
MONOCYTES # BLD: 0.4 K/UL (ref 0–1)
MONOCYTES NFR BLD: 7 % (ref 5–13)
NEUTS SEG # BLD: 3.5 K/UL (ref 1.8–8)
NEUTS SEG NFR BLD: 60 % (ref 32–75)
NRBC # BLD: 0 K/UL (ref 0–0.01)
NRBC BLD-RTO: 0 PER 100 WBC
PHOSPHATE SERPL-MCNC: 3.3 MG/DL (ref 2.6–4.7)
PLATELET # BLD AUTO: 196 K/UL (ref 150–400)
PMV BLD AUTO: 10.2 FL (ref 8.9–12.9)
POTASSIUM SERPL-SCNC: 4.8 MMOL/L (ref 3.5–5.1)
RBC # BLD AUTO: 2.81 M/UL (ref 3.8–5.2)
SERVICE CMNT-IMP: ABNORMAL
SERVICE CMNT-IMP: NORMAL
SODIUM SERPL-SCNC: 133 MMOL/L (ref 136–145)
SPECIMEN EXP DATE BLD: NORMAL
SPECIMEN HOLD: NORMAL
UNIT DIVISION: 0
WBC # BLD AUTO: 5.8 K/UL (ref 3.6–11)

## 2023-10-19 PROCEDURE — 6360000002 HC RX W HCPCS: Performed by: HOSPITALIST

## 2023-10-19 PROCEDURE — 97530 THERAPEUTIC ACTIVITIES: CPT

## 2023-10-19 PROCEDURE — 36415 COLL VENOUS BLD VENIPUNCTURE: CPT

## 2023-10-19 PROCEDURE — 6360000002 HC RX W HCPCS: Performed by: STUDENT IN AN ORGANIZED HEALTH CARE EDUCATION/TRAINING PROGRAM

## 2023-10-19 PROCEDURE — 6370000000 HC RX 637 (ALT 250 FOR IP): Performed by: HOSPITALIST

## 2023-10-19 PROCEDURE — 2580000003 HC RX 258: Performed by: INTERNAL MEDICINE

## 2023-10-19 PROCEDURE — 6370000000 HC RX 637 (ALT 250 FOR IP): Performed by: FAMILY MEDICINE

## 2023-10-19 PROCEDURE — 2580000003 HC RX 258: Performed by: FAMILY MEDICINE

## 2023-10-19 PROCEDURE — 97116 GAIT TRAINING THERAPY: CPT

## 2023-10-19 PROCEDURE — 90945 DIALYSIS ONE EVALUATION: CPT

## 2023-10-19 PROCEDURE — 80069 RENAL FUNCTION PANEL: CPT

## 2023-10-19 PROCEDURE — 97535 SELF CARE MNGMENT TRAINING: CPT

## 2023-10-19 PROCEDURE — 1200000000 HC SEMI PRIVATE

## 2023-10-19 PROCEDURE — 6370000000 HC RX 637 (ALT 250 FOR IP): Performed by: INTERNAL MEDICINE

## 2023-10-19 PROCEDURE — 82962 GLUCOSE BLOOD TEST: CPT

## 2023-10-19 PROCEDURE — 85025 COMPLETE CBC W/AUTO DIFF WBC: CPT

## 2023-10-19 RX ORDER — MIDODRINE HYDROCHLORIDE 5 MG/1
5 TABLET ORAL 3 TIMES DAILY PRN
Status: DISCONTINUED | OUTPATIENT
Start: 2023-10-19 | End: 2023-10-21 | Stop reason: HOSPADM

## 2023-10-19 RX ORDER — PANTOPRAZOLE SODIUM 40 MG/1
40 TABLET, DELAYED RELEASE ORAL
Qty: 60 TABLET | Refills: 0 | Status: SHIPPED | OUTPATIENT
Start: 2023-10-19 | End: 2023-11-18

## 2023-10-19 RX ORDER — METOCLOPRAMIDE 5 MG/1
5 TABLET ORAL 3 TIMES DAILY
Qty: 42 TABLET | Refills: 0 | Status: SHIPPED | OUTPATIENT
Start: 2023-10-19 | End: 2023-10-21 | Stop reason: SDUPTHER

## 2023-10-19 RX ORDER — CASTOR OIL AND BALSAM, PERU 788; 87 MG/G; MG/G
OINTMENT TOPICAL 2 TIMES DAILY
Qty: 5 G | Refills: 0 | Status: SHIPPED | OUTPATIENT
Start: 2023-10-19 | End: 2023-11-02

## 2023-10-19 RX ORDER — METOCLOPRAMIDE HYDROCHLORIDE 5 MG/ML
10 INJECTION INTRAMUSCULAR; INTRAVENOUS
Status: DISCONTINUED | OUTPATIENT
Start: 2023-10-19 | End: 2023-10-21 | Stop reason: HOSPADM

## 2023-10-19 RX ORDER — MIDODRINE HYDROCHLORIDE 5 MG/1
5 TABLET ORAL 3 TIMES DAILY PRN
Qty: 30 TABLET | Refills: 0 | Status: SHIPPED | OUTPATIENT
Start: 2023-10-19 | End: 2023-10-21 | Stop reason: SDUPTHER

## 2023-10-19 RX ADMIN — GENTAMICIN SULFATE: 1 CREAM TOPICAL at 19:32

## 2023-10-19 RX ADMIN — Medication: at 22:54

## 2023-10-19 RX ADMIN — SODIUM CHLORIDE, SODIUM LACTATE, CALCIUM CHLORIDE, MAGNESIUM CHLORIDE AND DEXTROSE 6000 ML: 1.5; 538; 448; 18.3; 5.08 INJECTION, SOLUTION INTRAPERITONEAL at 19:19

## 2023-10-19 RX ADMIN — METOCLOPRAMIDE 5 MG: 5 INJECTION, SOLUTION INTRAMUSCULAR; INTRAVENOUS at 11:29

## 2023-10-19 RX ADMIN — Medication 1 MG: at 09:38

## 2023-10-19 RX ADMIN — METOCLOPRAMIDE 10 MG: 5 INJECTION, SOLUTION INTRAMUSCULAR; INTRAVENOUS at 17:13

## 2023-10-19 RX ADMIN — SODIUM CHLORIDE, PRESERVATIVE FREE 10 ML: 5 INJECTION INTRAVENOUS at 09:39

## 2023-10-19 RX ADMIN — BENZONATATE 100 MG: 100 CAPSULE ORAL at 17:12

## 2023-10-19 RX ADMIN — DULOXETINE HYDROCHLORIDE 20 MG: 20 CAPSULE, DELAYED RELEASE ORAL at 21:03

## 2023-10-19 RX ADMIN — OXYCODONE HYDROCHLORIDE 5 MG: 5 TABLET ORAL at 11:29

## 2023-10-19 RX ADMIN — METOCLOPRAMIDE 5 MG: 5 INJECTION, SOLUTION INTRAMUSCULAR; INTRAVENOUS at 07:29

## 2023-10-19 RX ADMIN — ONDANSETRON 4 MG: 2 INJECTION INTRAMUSCULAR; INTRAVENOUS at 23:10

## 2023-10-19 RX ADMIN — BENZONATATE 100 MG: 100 CAPSULE ORAL at 21:03

## 2023-10-19 RX ADMIN — BENZONATATE 100 MG: 100 CAPSULE ORAL at 09:38

## 2023-10-19 RX ADMIN — PANTOPRAZOLE SODIUM 40 MG: 40 TABLET, DELAYED RELEASE ORAL at 17:13

## 2023-10-19 RX ADMIN — PANTOPRAZOLE SODIUM 40 MG: 40 TABLET, DELAYED RELEASE ORAL at 07:30

## 2023-10-19 RX ADMIN — MONTELUKAST 10 MG: 10 TABLET, FILM COATED ORAL at 21:03

## 2023-10-19 RX ADMIN — OXYCODONE HYDROCHLORIDE 5 MG: 5 TABLET ORAL at 17:22

## 2023-10-19 RX ADMIN — GENTAMICIN SULFATE: 1 CREAM TOPICAL at 09:39

## 2023-10-19 RX ADMIN — DULOXETINE HYDROCHLORIDE 20 MG: 20 CAPSULE, DELAYED RELEASE ORAL at 09:38

## 2023-10-19 RX ADMIN — ROSUVASTATIN CALCIUM 40 MG: 10 TABLET, FILM COATED ORAL at 09:38

## 2023-10-19 RX ADMIN — Medication: at 09:38

## 2023-10-19 ASSESSMENT — PAIN DESCRIPTION - ORIENTATION: ORIENTATION: LEFT

## 2023-10-19 ASSESSMENT — PAIN DESCRIPTION - LOCATION
LOCATION: BREAST;CHEST
LOCATION: STERNUM

## 2023-10-19 ASSESSMENT — PAIN SCALES - GENERAL
PAINLEVEL_OUTOF10: 7
PAINLEVEL_OUTOF10: 7

## 2023-10-19 ASSESSMENT — PAIN DESCRIPTION - DESCRIPTORS: DESCRIPTORS: ACHING

## 2023-10-19 NOTE — PLAN OF CARE
Problem: Discharge Planning  Goal: Discharge to home or other facility with appropriate resources  10/19/2023 1223 by Alistair Ellison RN  Outcome: Completed  10/19/2023 1025 by Alistair Ellison RN  Outcome: Progressing  Flowsheets (Taken 10/19/2023 0800)  Discharge to home or other facility with appropriate resources: Identify barriers to discharge with patient and caregiver     Problem: Safety - Adult  Goal: Free from fall injury  10/19/2023 1223 by Alistair Ellison RN  Outcome: Completed  10/19/2023 1025 by Alistair Ellison RN  Outcome: Progressing     Problem: Chronic Conditions and Co-morbidities  Goal: Patient's chronic conditions and co-morbidity symptoms are monitored and maintained or improved  10/19/2023 1223 by Alistair Ellison RN  Outcome: Completed  10/19/2023 1025 by Alistair Ellison RN  Outcome: Progressing  Flowsheets (Taken 10/19/2023 0800)  Care Plan - Patient's Chronic Conditions and Co-Morbidity Symptoms are Monitored and Maintained or Improved: Monitor and assess patient's chronic conditions and comorbid symptoms for stability, deterioration, or improvement     Problem: Skin/Tissue Integrity  Goal: Absence of new skin breakdown  Description: 1. Monitor for areas of redness and/or skin breakdown  2. Assess vascular access sites hourly  3. Every 4-6 hours minimum:  Change oxygen saturation probe site  4. Every 4-6 hours:  If on nasal continuous positive airway pressure, respiratory therapy assess nares and determine need for appliance change or resting period.   10/19/2023 1223 by Alistair Ellison RN  Outcome: Completed  10/19/2023 1025 by Alistair Ellison RN  Outcome: Progressing     Problem: Pain  Goal: Verbalizes/displays adequate comfort level or baseline comfort level  10/19/2023 1223 by Alistair Ellison RN  Outcome: Completed  10/19/2023 1025 by Alistair Ellison RN  Outcome: Progressing  Flowsheets (Taken 10/18/2023 2050 by Cherl Fabry, RN)  Verbalizes/displays adequate comfort level or baseline

## 2023-10-19 NOTE — PROGRESS NOTES
10/19/23 1335 10/19/23 1337 10/19/23 1342   Vitals   Pulse (!) 113 (!) 120 (!) 122   BP (!) 157/89 106/71 112/75   BP Location Left upper arm Left upper arm Left upper arm   BP Upper/Lower Upper Upper Upper   BP Method Automatic Automatic Automatic   Patient Position Sitting Standing Standing  (post ambulation to door)      10/19/23 1349   Vitals   Pulse (!) 119   /81   BP Location Left upper arm   BP Upper/Lower Upper   BP Method Automatic   Patient Position Sitting  (EOB)

## 2023-10-19 NOTE — PROGRESS NOTES
Patient waited all evening for her family to bring in her \"turkey wings and leg. \"  They finally arrived with it around 2130. Patient ate a giant wing and leg almost completely. At about 2245 patient had emesis of about 200 mls. Given zofran, per orders. Patient reports she is feeling better now.

## 2023-10-19 NOTE — PLAN OF CARE
Problem: Discharge Planning  Goal: Discharge to home or other facility with appropriate resources  Outcome: Progressing  Flowsheets (Taken 10/19/2023 0800)  Discharge to home or other facility with appropriate resources: Identify barriers to discharge with patient and caregiver     Problem: Safety - Adult  Goal: Free from fall injury  Outcome: Progressing     Problem: Chronic Conditions and Co-morbidities  Goal: Patient's chronic conditions and co-morbidity symptoms are monitored and maintained or improved  Outcome: Progressing  Flowsheets (Taken 10/19/2023 0800)  Care Plan - Patient's Chronic Conditions and Co-Morbidity Symptoms are Monitored and Maintained or Improved: Monitor and assess patient's chronic conditions and comorbid symptoms for stability, deterioration, or improvement     Problem: Skin/Tissue Integrity  Goal: Absence of new skin breakdown  Description: 1. Monitor for areas of redness and/or skin breakdown  2. Assess vascular access sites hourly  3. Every 4-6 hours minimum:  Change oxygen saturation probe site  4. Every 4-6 hours:  If on nasal continuous positive airway pressure, respiratory therapy assess nares and determine need for appliance change or resting period.   Outcome: Progressing     Problem: Pain  Goal: Verbalizes/displays adequate comfort level or baseline comfort level  Outcome: Progressing  Flowsheets (Taken 10/18/2023 2050 by Thom Han RN)  Verbalizes/displays adequate comfort level or baseline comfort level: Encourage patient to monitor pain and request assistance     Problem: Nutrition Deficit:  Goal: Optimize nutritional status  Outcome: Progressing

## 2023-10-19 NOTE — PROGRESS NOTES
Bedside shift change report given to Lena (oncoming nurse) by Gladys Lockett (offgoing nurse). Report included the following information Nurse Handoff Report, Intake/Output, MAR, and Cardiac Rhythm sinus tach . Patient still has blood transfusion going.

## 2023-10-19 NOTE — PROGRESS NOTES
[facility]   [address], [address] [facility], [address] [facility]  Phone: [phone]   [phone]    [email or web address]     Nephrology Progress Note    Patient Name : Juan Luis Kauffman      : 1955     MRN : [id number]  Date of Admission : 10/12/2023  Date of Servive : 10/19/23    CC: Follow up for ESRD       Assessment and Plan   ESRD on PD:  - CCPD nightly at home, patient of Dr. Keyla Rojas clinic  - Home Rx: 5X 2300 mL, 9.5-hour treatment time, 1000 mL icodextrin last fill  - Continue with current CCPD orders 1.5% solutions    Abd pain:  -No evidence of peritonitis  - 10/13 cell count normal     Anemia of CKD:  - Hgb 8.0, s/p transfusion  - EGD (-) for active bleed- biopsy pending  - transfuse for hgb <7  -  LIS 20K MWF    Hypokalemia-> hyperkalemia  -Repeat K stable- no supplements     Hypertension:  - BP stable    Left breast discomfort  - primary team to evaluation     CAD s/p CABG 23  DM  COPD  HLD     Interval History:  Patient seen and examined this AM. No further nausea or vomiting. Breathing better- no cough. Family brought in food and she tolerated it w/o issues. UF from  ml. Review of Systems: Pertinent items are noted in HPI.     Current Medications:   Current Facility-Administered Medications   Medication Dose Route Frequency    benzonatate (TESSALON) capsule 100 mg  100 mg Oral TID    0.9 % sodium chloride infusion   IntraVENous PRN    midodrine (PROAMATINE) tablet 5 mg  5 mg Oral TID WC    Epoetin Juan Francisco-epbx (RETACRIT) injection 20,000 Units  20,000 Units SubCUTAneous Once per day on     pantoprazole (PROTONIX) tablet 40 mg  40 mg Oral BID AC    albuterol sulfate HFA (PROVENTIL;VENTOLIN;PROAIR) 108 (90 Base) MCG/ACT inhaler 2 puff  2 puff Inhalation Q6H PRN    metoclopramide (REGLAN) injection 5 mg  5 mg IntraVENous TID AC    dextrose bolus 10% 125 mL  125 mL IntraVENous PRN    Or    dextrose bolus 10% 250 mL  250 mL IntraVENous Meli Holman [address] [facility]

## 2023-10-19 NOTE — PROGRESS NOTES
Hospitalist Progress Note  Chucky Austin MD  Answering service:         Date of Service:  10/19/2023  NAME:  Paris Carlos  :  1955  MRN:  [id number]      Admission Summary:   Paris Carlos is a 70 y.o. female with hx of cad s/p cabg, esrd on PD, chronic recurrent nausea, vomiting and abdominal pain, hfref who presented to ed with complaints of abdominal pain. Patient states she has had constant, aching, generalized abdominal pain for the last week and a half. States symptoms were present at time of discharge from hospital.  She was recently admitted and underwent CABG. She endorses aching sternal pain which she states is unchanged since her surgical procedure. She has had 1 episode of nonbloody, nonbilious emesis. Endorses malaise, fatigue and some anorexia due to her aching abdominal pain. Patient was just admitted from  to 10/6 for syncopal, UTI ,hyponatremia and similar abd pain, vomiting, nausea. The patient denies any fever, chills, chest pain, cough, congestion, recent illness, palpitations, or dysuria/urgency or frequency. Remarkable vitals on ER Presentation: vss  Labs Remarkable for: lactic acid 2.9, cr 7.71, hgb 7.5, UA: lg bld, wbc, 1+ bact  ER Images: ct chest abd et pelvis: no acute process  ER Rx: asa 324, cefepime, cymbalta   CT ABDOMEN PELVIS WO CONTRAST Additional Contrast? None    Result Date: 10/13/2023  1. No acute pathology in the chest, abdomen, or pelvis. 2.  Small pulmonary nodules. Consider follow-up chest CT in one year to patient's history of smoking. 3.  Peritoneal catheter in place with free fluid in the abdomen and pelvis. 4.  Small stones or sludge in gallbladder. CT CHEST WO CONTRAST    Result Date: 10/13/2023  1. No acute pathology in the chest, abdomen, or pelvis. 2.  Small pulmonary nodules.  Consider follow-up chest CT in one year to patient's history of smoking. 3.  Peritoneal catheter in place with free fluid in the abdomen and pelvis. 4.  Small stones or sludge in gallbladder. XR CHEST PORTABLE    Result Date: 10/12/2023  No acute process on portable chest. No change. Interval history / Subjective:   Discussed with patient RN and daughter patient had episode of emesis last night and one episode this morning she denies any current nausea     Assessment & Plan:     Recurrent Abdominal Pain / Nausea and Vomiting  -was just admitted from 9/30 to 10/6 with similar problems   -appears to be a chronic recurrent problem  -? Gastritis, + gall stone, + diabetic gastroparesis   -peritoneal fluid analysis : no infection of pd filuid   -morphine prn, zofran prn   -pd per nephro  -diet advance as tolerate   -still having severe nausea   -?  Diabetic gastroparesis:started  ATC small dose reglan : some  improvement   Consult GI for her persistent nausea   Had EGD in [facility] by Dr. Zabrina Bowers 8/16 reported erosive gastritis   Continue PPI   IVF started per renal   EGD 10/17: :Patchy erythema and congestion in antrum, biopsies done, no ulcers seen.  -biopsy was neg for h pylori   Diet restart, continue ATC reglan     Small stones or sludge in gallbladder   -not sure this was the cause of her intermittent upper abd pain  -consult general surgery :  HIDA scan negative     Orthostatic hypotension  Which was not new, had syncopal due to his per last admission   Hold home BP meds  Blood pressure running hypertensive this a.m. switch midodrine to as needed  Rpt orthostats neg       Hyponatremia  PD fluid adjust per nephrologist     UTI  Abx as above  Last admission culture is serratia   Following cultures  so far neg  Dc cefepime 10/17       Fatigue   PT/OT      Hx of CAD s/p CABG 9/7/2023 / Elevated Trop  -trops actually down trending  -continue home meds     ESRD on PD  -nephro on consult for pd     Anemia  -likely anemia of ckd  -hgb 7.5 with baseline

## 2023-10-19 NOTE — PROGRESS NOTES
[phone]: Spoke w/ pt's daughter Jose M Austin on the phone to let her know Dr. Magno Chery had said pt is medically ready for d/c. She expressed concern over pt's nausea ovn and today and about pt's prior hypotension issues. This RN let her know that pt's BP had been stable and last orthostatic vitals were normal. Pt's daughter requested to have [address] [employer] call her. Messaged via [employer]

## 2023-10-19 NOTE — PROGRESS NOTES
Comprehensive Nutrition Assessment    Type and Reason for Visit: Reassess    Nutrition Recommendations/Plan:   Recommend GI bland diet with N/V  Continue ONS at meals       Malnutrition Assessment:  Malnutrition Status:  Insufficient data (10/16/23 1457)         Nutrition Assessment:    PMHx includes CAD s/p CABG, ESRD on PD, recurrent N/V and abdominal pain, gastritis, gastric intestinal metaplasia, S/P EGD 8/2023 which showed multiple large ulcers that were biopsied, HFrEF who presented to RD with c/o abdominal pain. Patient states she has had constant, aching, generalized abdominal pain for the last week and a half. States symptoms were present at time of discharge from hospital.  She was recently admitted and underwent CABG. Pt admitted with recurrent abdominal pain. Noted for gallstones, UTI, ESRD on PD.      10/19: follow-up. Discussed in IDRs - pt is medically ready for d/c and plan was for d/c today. However family concerned with pt's N/V overnight (now resolved) and recent hypotensive issues. Family has been bringing in food from outside. Had chicken wings last night after which she vomited. Also with episode of vomiting this AM.  EGD completed 10/17 showed antral erythema and congestion but no ulcers, bx taking to r/o h pylori. Unclear if vomiting r/t food choices, ongoing GI issues, or low BP. Weight back up, very difficult to assess d/t dialysis. Labs OK. Spoke with pt at bedside. She said nausea is gone right now, but it comes and goes and has been for a while. She isn't sure what causes it. Appetite is OK, but didn't like what we sent for lunch. She has tried the ONS. Likes the [employer]. Was not a big fan of the vanilla ONS (unclear if Nepro vs Glucerna), but wants to try the berry Nepro which she has in front of her currently. She also wants to try the chocolate Glucerna (which is ordered, but hasn't received any chocolate flavors yet).   She doesn't know her UBW/ current body Current Body Weight: 84.9 kg (187 lb 3.2 oz), 149.8 % IBW. Weight Source: Bed Scale  Current BMI (kg/m2): 31.2        Weight Adjustment For: No Adjustment                 BMI Categories: Obese Class 1 (BMI 30.0-34. 9)    Weights(Current Encounter) (last 50 days)          Date/Time Weight Weight Method     10/19/23 0410 84.9 kg (187 lb 3.2 oz) Bed scale     10/18/23 0339 76.5 kg (168 lb 10.4 oz) Bed scale     10/17/23 0356 76.5 kg (168 lb 10.4 oz) Bed scale     10/14/23 0345 75.1 kg (165 lb 9.1 oz) Bed scale     10/12/23 2115 70 kg (154 lb 5.2 oz) Estimated                  Date/Time Weight Weight Method     09/08/23 0600 80.8 kg (178 lb 2.1 oz) Standing scale     09/07/23 0550 78.6 kg (173 lb 4.5 oz) --     09/07/23 0458 78.2 kg (172 lb 6.4 oz) Standing scale     09/06/23 0606 78.6 kg (173 lb 4.5 oz) Standing scale     09/05/23 0600 79.4 kg (175 lb 0.7 oz) Standing scale     09/04/23 0400 77.4 kg (170 lb 10.2 oz) Standing scale     09/03/23 0809 78.5 kg (173 lb 1 oz) Standing scale     09/02/23 0324 79.9 kg (176 lb 2.4 oz) Standing scale     09/01/23 0730 79.6 kg (175 lb 7.8 oz) Standing scale     08/31/23 1252 79.4 kg (175 lb 0.7 oz) --     08/31/23 0715 79.4 kg (175 lb 0.7 oz) Standing scale     08/30/23 0515 81.3 kg (179 lb 3.7 oz) Standing scale     08/29/23 0315 81.4 kg (179 lb 7.3 oz) Standing scale     08/28/23 0714 81.6 kg (179 lb 14.3 oz) Standing scale     08/27/23 0721 82.5 kg (181 lb 14.1 oz) Standing scale     08/26/23 0537 83.1 kg (183 lb 3.2 oz) Standing scale     08/25/23 0714 84.5 kg (186 lb 4.6 oz) Standing scale     08/24/23 0400 89.1 kg (196 lb 6.9 oz) Bed scale     08/21/23 1547 88.9 kg (195 lb 15.8 oz) --           Wt Readings from Last 10 Encounters:   10/14/23 75.1 kg (165 lb 9.1 oz)   10/09/23 69.9 kg (154 lb)   10/05/23 70.1 kg (154 lb 8.7 oz)   09/18/23 80.3 kg (177 lb)   09/15/23 78.9 kg (174 lb)   09/14/23 78.4 kg (172 lb 13.5 oz)   08/16/23 88.9 kg (196 lb)   07/24/23 89.2 kg (196 lb

## 2023-10-19 NOTE — PROGRESS NOTES
TRANSFER - OUT REPORT:    Verbal report given to Ana Lilia MARRERO on [employer]  being transferred to  for routine progression of patient care       Report consisted of patient's Situation, Background, Assessment and   Recommendations(SBAR). Information from the following report(s) Nurse Handoff Report, Index, Adult Overview, Intake/Output, MAR, Recent Results, and Cardiac Rhythm NSR/STach  was reviewed with the receiving nurse. Lines:   Peripheral IV 10/18/23 Proximal;Right Forearm (Active)   Site Assessment Clean, dry & intact 10/19/23 1200   Line Status Flushed;Normal saline locked 10/19/23 1200   Line Care Connections checked and tightened 10/19/23 1200   Phlebitis Assessment No symptoms 10/19/23 1200   Infiltration Assessment 0 10/19/23 1200   Alcohol Cap Used Yes 10/19/23 1200   Dressing Status Clean, dry & intact 10/19/23 1200   Dressing Type Transparent 10/19/23 1200        Opportunity for questions and clarification was provided.

## 2023-10-19 NOTE — PLAN OF CARE
Problem: Occupational Therapy - Adult  Goal: By Discharge: Performs self-care activities at highest level of function for planned discharge setting. See evaluation for individualized goals. Description: FUNCTIONAL STATUS PRIOR TO ADMISSION:    Receives Help From: Family, ADL Assistance: Needs assistance,  ,  ,  ,  ,  , Homemaking Assistance: Needs assistance, Ambulation Assistance: Needs assistance, Transfer Assistance: Needs assistance, Active : No     HOME SUPPORT: Patient lived with multiple family members who she reported had been assisting more often since CABG particularly with toileting and bathing,had been working with home health 3x/weekly. Occupational Therapy Goals:  Initiated 10/17/2023  1. Patient will perform grooming in standing for 2 minutes without LOB or orthostatic drop with  Contact Guard Assist within 7 day(s). 2.  Patient will perform lower body dressing with AE PRN with Minimal Assist within 7 day(s). 3.  Patient will perform toileting with AE PRN Minimal Assist within 7 day(s). 4.  Patient will perform toilet transfers with Contact Guard Assist  within 7 day(s). 5.  Patient will recall 3/3 sternal precautions and \"move in tube\" with functional transfers 100% of the time with verbal cues and SBA  within 7 day(s). 6.  Patient will participate in sternal precaution compliant upper extremity therapeutic exercise/activities with Supervision for 8 minutes within 7 day(s). 7.  Patient will utilize energy conservation techniques during functional activities with verbal and visual cues within 7 day(s).     Outcome: Progressing   OCCUPATIONAL THERAPY TREATMENT  Patient: Radhika Windham (64 y.o. female)  Date: 10/19/2023  Primary Diagnosis: Abdominal pain, epigastric [R10.13]  ESRD (end stage renal disease) ([address]) [N18.6]  Elevated troponin [T77.94]  Complicated UTI (urinary tract infection) [N39.0]  Procedure(s) (LRB):  EGD ESOPHAGOGASTRODUODENOSCOPY (N/A) 2 Days Post-Op

## 2023-10-19 NOTE — FLOWSHEET NOTE
10/19/23 0630   Peritoneal Dialysis Catheter Mid lower abdomen   No placement date or time found. Catheter Location: Mid lower abdomen   Status Deaccessed   Site Condition Clean, dry, intact   Dressing Status Clean, dry & intact   Dressing Gauze   Date of Last Dressing Change 10/18/23   Dialysis Type Continuous cycling   Catheter Care Given Yes  (Two minute Alcavis scrub/soak performed prior to disconnection.)   Post-Treatment (Cycler)   Average Dwell Time (Hours:Minutes) 1:29   Lost Dwell Time (Hours:Minutes) :32   Effluent Appearance Clear;Yellow   Volume Gain (mL)   (Idrain 76ml + total UF - 189 = Net  ml)     Primary RN SBAR: Sherrie Beltran RN  Comments: Arrived to disconnect patient for end of therapy. Patient found to have \"low UF\" alarm and is still in drain 5 of 5. Patient turned/repositioned and drain restarted. At end of therapy two minute Alcavis scrub/soak performed. Patient disconnected and sterile mini cap placed. PD catheter taped securely to the the patient's abdomen. Patient left with bed low, siderails X 2, call bell and belongings in reach.

## 2023-10-19 NOTE — PLAN OF CARE
Problem: Physical Therapy - Adult  Goal: By Discharge: Performs mobility at highest level of function for planned discharge setting. See evaluation for individualized goals. Description: FUNCTIONAL STATUS PRIOR TO ADMISSION: Patient was modified independent using a rollator for functional mobility. Pt reported 2-3 syncopal episodes at home    [facility]: The patient lived with family and required assistance for some ADLs and mobility. Physical Therapy Goals  Initiated 10/17/2023  1. Patient will move from supine to sit and sit to supine and roll side to side in bed with modified independence within 7 day(s). 2.  Patient will perform sit to stand with supervision/set-up within 7 day(s). 3.  Patient will transfer from bed to chair and chair to bed with supervision/set-up using the least restrictive device within 7 day(s). 4.  Patient will ambulate with supervision/set-up for 50 feet with the least restrictive device within 7 day(s). 5.  Patient will ascend/descend 3 stairs with single handrail(s) with supervision/set-up within 7 day(s). Outcome: Progressing   PHYSICAL THERAPY TREATMENT    Patient: Lanette Florentino (38 y.o. female)  Date: 10/19/2023  Diagnosis: Abdominal pain, epigastric [R10.13]  ESRD (end stage renal disease) ([address]) [N18.6]  Elevated troponin [P01.29]  Complicated UTI (urinary tract infection) [N39.0] Elevated troponin  Procedure(s) (LRB):  EGD ESOPHAGOGASTRODUODENOSCOPY (N/A) 2 Days Post-Op  Precautions: Fall Risk (Orthostatic hypotensive, move in the tube (recent CABG))                    ASSESSMENT:  Patient continues to benefit from skilled PT services and is progressing towards goals. Pt tolerated session well, but continues to be limited by generalized weakness, decreased functional mobility, impaired balance and gait, decreased tolerance to activity. Pt was able to tolerate progressive gait training with RW with chair follow for safety.  Pt with intermittent

## 2023-10-20 LAB
GLUCOSE BLD STRIP.AUTO-MCNC: 154 MG/DL (ref 65–117)
GLUCOSE BLD STRIP.AUTO-MCNC: 156 MG/DL (ref 65–117)
GLUCOSE BLD STRIP.AUTO-MCNC: 168 MG/DL (ref 65–117)
GLUCOSE BLD STRIP.AUTO-MCNC: 221 MG/DL (ref 65–117)
SERVICE CMNT-IMP: ABNORMAL

## 2023-10-20 PROCEDURE — 6370000000 HC RX 637 (ALT 250 FOR IP): Performed by: HOSPITALIST

## 2023-10-20 PROCEDURE — 6360000002 HC RX W HCPCS: Performed by: STUDENT IN AN ORGANIZED HEALTH CARE EDUCATION/TRAINING PROGRAM

## 2023-10-20 PROCEDURE — 6370000000 HC RX 637 (ALT 250 FOR IP): Performed by: FAMILY MEDICINE

## 2023-10-20 PROCEDURE — 6360000002 HC RX W HCPCS: Performed by: NURSE PRACTITIONER

## 2023-10-20 PROCEDURE — 2580000003 HC RX 258: Performed by: INTERNAL MEDICINE

## 2023-10-20 PROCEDURE — 1200000000 HC SEMI PRIVATE

## 2023-10-20 PROCEDURE — 82962 GLUCOSE BLOOD TEST: CPT

## 2023-10-20 PROCEDURE — 2580000003 HC RX 258: Performed by: FAMILY MEDICINE

## 2023-10-20 PROCEDURE — 90945 DIALYSIS ONE EVALUATION: CPT

## 2023-10-20 RX ORDER — SODIUM CHLORIDE, SODIUM LACTATE, CALCIUM CHLORIDE, MAGNESIUM CHLORIDE AND DEXTROSE 1.5; 538; 448; 18.3; 5.08 G/100ML; MG/100ML; MG/100ML; MG/100ML; MG/100ML
6000 INJECTION, SOLUTION INTRAPERITONEAL
Status: DISCONTINUED | OUTPATIENT
Start: 2023-10-20 | End: 2023-10-20 | Stop reason: CLARIF

## 2023-10-20 RX ADMIN — GENTAMICIN SULFATE: 1 CREAM TOPICAL at 09:18

## 2023-10-20 RX ADMIN — METOCLOPRAMIDE 10 MG: 5 INJECTION, SOLUTION INTRAMUSCULAR; INTRAVENOUS at 09:14

## 2023-10-20 RX ADMIN — BENZONATATE 100 MG: 100 CAPSULE ORAL at 09:17

## 2023-10-20 RX ADMIN — OXYCODONE HYDROCHLORIDE 5 MG: 5 TABLET ORAL at 16:16

## 2023-10-20 RX ADMIN — BENZONATATE 100 MG: 100 CAPSULE ORAL at 16:12

## 2023-10-20 RX ADMIN — DULOXETINE HYDROCHLORIDE 20 MG: 20 CAPSULE, DELAYED RELEASE ORAL at 22:00

## 2023-10-20 RX ADMIN — ROSUVASTATIN CALCIUM 40 MG: 10 TABLET, FILM COATED ORAL at 09:17

## 2023-10-20 RX ADMIN — MONTELUKAST 10 MG: 10 TABLET, FILM COATED ORAL at 22:00

## 2023-10-20 RX ADMIN — PANTOPRAZOLE SODIUM 40 MG: 40 TABLET, DELAYED RELEASE ORAL at 16:11

## 2023-10-20 RX ADMIN — METOCLOPRAMIDE 10 MG: 5 INJECTION, SOLUTION INTRAMUSCULAR; INTRAVENOUS at 16:12

## 2023-10-20 RX ADMIN — BENZONATATE 100 MG: 100 CAPSULE ORAL at 22:00

## 2023-10-20 RX ADMIN — METOCLOPRAMIDE 10 MG: 5 INJECTION, SOLUTION INTRAMUSCULAR; INTRAVENOUS at 06:49

## 2023-10-20 RX ADMIN — SODIUM CHLORIDE, SODIUM LACTATE, CALCIUM CHLORIDE, MAGNESIUM CHLORIDE AND DEXTROSE 6000 ML: 1.5; 538; 448; 18.3; 5.08 INJECTION, SOLUTION INTRAPERITONEAL at 15:52

## 2023-10-20 RX ADMIN — Medication: at 23:00

## 2023-10-20 RX ADMIN — DULOXETINE HYDROCHLORIDE 20 MG: 20 CAPSULE, DELAYED RELEASE ORAL at 09:17

## 2023-10-20 RX ADMIN — SODIUM CHLORIDE, PRESERVATIVE FREE 10 ML: 5 INJECTION INTRAVENOUS at 09:19

## 2023-10-20 RX ADMIN — PANTOPRAZOLE SODIUM 40 MG: 40 TABLET, DELAYED RELEASE ORAL at 07:07

## 2023-10-20 RX ADMIN — SODIUM CHLORIDE, PRESERVATIVE FREE 10 ML: 5 INJECTION INTRAVENOUS at 22:00

## 2023-10-20 RX ADMIN — ERYTHROPOIETIN 20000 UNITS: 20000 INJECTION, SOLUTION INTRAVENOUS; SUBCUTANEOUS at 19:11

## 2023-10-20 RX ADMIN — OXYCODONE HYDROCHLORIDE 5 MG: 5 TABLET ORAL at 06:50

## 2023-10-20 RX ADMIN — Medication: at 09:18

## 2023-10-20 RX ADMIN — GENTAMICIN SULFATE: 1 CREAM TOPICAL at 15:51

## 2023-10-20 RX ADMIN — Medication 1 MG: at 09:17

## 2023-10-20 ASSESSMENT — PAIN DESCRIPTION - DESCRIPTORS: DESCRIPTORS: ACHING

## 2023-10-20 ASSESSMENT — PAIN DESCRIPTION - ORIENTATION: ORIENTATION: LOWER

## 2023-10-20 ASSESSMENT — PAIN SCALES - GENERAL: PAINLEVEL_OUTOF10: 8

## 2023-10-20 ASSESSMENT — PAIN - FUNCTIONAL ASSESSMENT: PAIN_FUNCTIONAL_ASSESSMENT: ACTIVITIES ARE NOT PREVENTED

## 2023-10-20 ASSESSMENT — PAIN DESCRIPTION - LOCATION: LOCATION: BREAST

## 2023-10-20 NOTE — PROGRESS NOTES
[facility]   [address], [address] [facility], [address] [facility]  Phone: [phone]   [phone]    [email or web address]     Nephrology Progress Note    Patient Name : Cristal Carlson      : 1955     MRN : [id number]  Date of Admission : 10/12/2023  Date of Servive : 10/20/23    CC: Follow up for ESRD       Assessment and Plan   ESRD on PD:  - CCPD nightly at home, patient of Dr. Sammy [facility]  - Home Rx: 5X 2300 mL, 9.5-hour treatment time, 1000 mL icodextrin last fill  - Continue with current CCPD orders 1.5% solutions  - Renally dose medications    Abd pain:  -No evidence of peritonitis  - 10/13 cell count normal     Anemia of CKD:  - Hgb 8.0, s/p transfusion  - EGD (-) for active bleed- biopsy pending  - transfuse for hgb <7  -  LIS 20K MWF    Hypokalemia-> hyperkalemia  -resolved     Hypertension:  - BP stable    CAD s/p CABG 23  DM  COPD  HLD     Interval History:  Patient seen and examined this AM.  She denies nausea or vomiting today UF last night was 292 cc. No new complaints. Review of Systems: Pertinent items are noted in HPI.     Current Medications:   Current Facility-Administered Medications   Medication Dose Route Frequency    midodrine (PROAMATINE) tablet 5 mg  5 mg Oral TID PRN    metoclopramide (REGLAN) injection 10 mg  10 mg IntraVENous TID AC    benzonatate (TESSALON) capsule 100 mg  100 mg Oral TID    0.9 % sodium chloride infusion   IntraVENous PRN    Epoetin Juan Francisco-epbx (RETACRIT) injection 20,000 Units  20,000 Units SubCUTAneous Once per day on     pantoprazole (PROTONIX) tablet 40 mg  40 mg Oral BID AC    albuterol sulfate HFA (PROVENTIL;VENTOLIN;PROAIR) 108 (90 Base) MCG/ACT inhaler 2 puff  2 puff Inhalation Q6H PRN    dextrose bolus 10% 125 mL  125 mL IntraVENous PRN    Or    dextrose bolus 10% 250 mL  250 mL IntraVENous PRN    glucagon injection 1 mg  1 mg SubCUTAneous PRN    dextrose 10 % infusion   IntraVENous

## 2023-10-20 NOTE — WOUND CARE
WOCN Note:   Follow up visit    Chart shows:  Admitted on 10/13/23 from home. Admitted for elevated troponin. History of s/p CABG x3 9/7/23; ESRD with PD. Assessment:   Appropriately conversational and able to turn independently, sits up on the side of the bed independently. Reports no pain. Surface: foam mattress; heels offloaded with pillow  Sacrum intact without erythema; sacral foam in use. Diet: Regular; Low Fat/Low Chol/High Fiber/2 gm Na; Low Potassium (Less than 3000 mg/day)    POA Non Blanching Erythema to Right Heel  0.7 x 0.7 cm area of non-blanching erythema, Balsam peru-castor oil (Venelex) applied, heels offloaded with pillows. POA Partial Thickness Wound to Right Buttock  Bilateral upper buttocks have newly-resurfaced, fragile, pink/red tissue. Right buttock with 0.4 x 0.5 x <0.1 cm open area within pink resurfaced tissue. Pink wound bed, small serous exudate. Applied Balsam peru-castor oil (Venelex), covered with foam dressing    Wound Recommendations:    Heels and buttocks: venelex twice daily; cover sacrum with foam    PI Prevention:  Turn/reposition approximately every 2 hours  Offload heels with heels hanging off end of pillow at all times while in bed. Sacral Foam dressing: lift to assess regularly; change as needed. Discontinue if incontinence is frequently soiling dressing.        Transition of Care: Plan to follow weekly and as needed while admitted to hospital.     Alvaro Tatum  MSN, RN  [facility] Inpatient [facility]   Available through [address] [place] [address]

## 2023-10-20 NOTE — PROGRESS NOTES
Skin color, texture, turgor normal. No rashes or lesions   Neurologic: CNII-XII grossly intact. Data Review:    Review and/or order of clinical lab test    I have independently reviewed and interpreted patient's lab and all other diagnostic data    Notes reviewed from all clinical/nonclinical/nursing services involved in patient's clinical care. Care coordination discussions were held with appropriate clinical/nonclinical/ nursing providers based on care coordination needs. Labs:     Recent Labs     10/18/23  0621 10/19/23  0456   WBC 5.7 5.8   HGB 7.1* 8.0*   HCT 23.4* 26.2*    196       Recent Labs     10/18/23  0621 10/19/23  0456   * 133*   K 5.0 4.8    102   CO2 26 27   BUN 28* 28*   PHOS 2.6 3.3       No results for input(s): \"ALT\", \"TP\", \"ALB\", \"GLOB\", \"GGT\", \"AML\" in the last 72 hours. Invalid input(s): \"SGOT\", \"GPT\", \"AP\", \"TBIL\", \"TBILI\", \"AMYP\", \"LPSE\", \"HLPSE\"    No results for input(s): \"INR\", \"APTT\" in the last 72 hours. Invalid input(s): \"PTP\"     No results for input(s): \"TIBC\", \"FERR\" in the last 72 hours. Invalid input(s): \"FE\", \"PSAT\"   No results found for: \"FOL\", \"RBCF\"   No results for input(s): \"PH\", \"PCO2\", \"PO2\" in the last 72 hours. No results for input(s): \"CPK\" in the last 72 hours.     Invalid input(s): \"CPKMB\", \"CKNDX\", \"TROIQ\"  No results found for: \"CHOL\", \"CHOLX\", \"CHLST\", \"CHOLV\", \"HDL\", \"HDLC\", \"LDL\", \"LDLC\", \"TGLX\", \"TRIGL\"  Lab Results   Component Value Date/Time    GLUCPOC 159 01/09/2023 03:20 PM    GLUCPOC 166 01/09/2023 03:05 PM     [unfilled]      Medications Reviewed:     Current Facility-Administered Medications   Medication Dose Route Frequency    midodrine (PROAMATINE) tablet 5 mg  5 mg Oral TID PRN    metoclopramide (REGLAN) injection 10 mg  10 mg IntraVENous TID AC    benzonatate (TESSALON) capsule 100 mg  100 mg Oral TID    0.9 % sodium chloride infusion   IntraVENous PRN    Epoetin Juan Francisco-epbx (RETACRIT) injection 20,000 Units ______________________________________________________________________  EXPECTED LENGTH OF STAY: 8  ACTUAL LENGTH OF STAY:          Shanita Al MD

## 2023-10-20 NOTE — FLOWSHEET NOTE
[facility] DISCONNECT: 10/20/23 0830   Peritoneal Dialysis Catheter Mid lower abdomen   No placement date or time found. Catheter Location: Mid lower abdomen   Status Deaccessed   Site Condition Clean, dry, intact   Dressing Status Clean, dry & intact   Dressing Gauze   Date of Last Dressing Change 10/19/23   Dialysis Type Continuous cycling   Catheter Care Given Yes  (Two minute Alcavis scrub/soak performed prior to disconnection.)   Post-Treatment (Cycler)   Average Dwell Time (Hours:Minutes) 1:24   Lost Dwell Time (Hours:Minutes) 0:33   Effluent Appearance Clear;Yellow   I Drain (mL) 48 mL   PD Output (mL) 340 mL  (i drain 48 + total )   Primary RN SBAR: Luna Saravia RN  Comments: LOW UF alarm on arrival to disconnect patient. Patient repositioned and sat on EOB, drain resumed. Treatment ended as ordered. Call bell and personal belongings within reach.

## 2023-10-21 VITALS
HEIGHT: 65 IN | DIASTOLIC BLOOD PRESSURE: 77 MMHG | RESPIRATION RATE: 16 BRPM | BODY MASS INDEX: 31.19 KG/M2 | HEART RATE: 112 BPM | OXYGEN SATURATION: 100 % | SYSTOLIC BLOOD PRESSURE: 157 MMHG | TEMPERATURE: 98.6 F | WEIGHT: 187.2 LBS

## 2023-10-21 LAB
ANION GAP SERPL CALC-SCNC: 4 MMOL/L (ref 5–15)
BASOPHILS # BLD: 0.1 K/UL (ref 0–0.1)
BASOPHILS NFR BLD: 1 % (ref 0–1)
BUN SERPL-MCNC: 29 MG/DL (ref 6–20)
BUN/CREAT SERPL: 4 (ref 12–20)
CALCIUM SERPL-MCNC: 7.6 MG/DL (ref 8.5–10.1)
CHLORIDE SERPL-SCNC: 102 MMOL/L (ref 97–108)
CO2 SERPL-SCNC: 30 MMOL/L (ref 21–32)
CREAT SERPL-MCNC: 6.54 MG/DL (ref 0.55–1.02)
DIFFERENTIAL METHOD BLD: ABNORMAL
EOSINOPHIL # BLD: 0.2 K/UL (ref 0–0.4)
EOSINOPHIL NFR BLD: 2 % (ref 0–7)
ERYTHROCYTE [DISTWIDTH] IN BLOOD BY AUTOMATED COUNT: 17.6 % (ref 11.5–14.5)
GLUCOSE BLD STRIP.AUTO-MCNC: 128 MG/DL (ref 65–117)
GLUCOSE BLD STRIP.AUTO-MCNC: 174 MG/DL (ref 65–117)
GLUCOSE SERPL-MCNC: 126 MG/DL (ref 65–100)
HCT VFR BLD AUTO: 28 % (ref 35–47)
HGB BLD-MCNC: 8.4 G/DL (ref 11.5–16)
IMM GRANULOCYTES # BLD AUTO: 0.1 K/UL (ref 0–0.04)
IMM GRANULOCYTES NFR BLD AUTO: 1 % (ref 0–0.5)
LYMPHOCYTES # BLD: 2.2 K/UL (ref 0.8–3.5)
LYMPHOCYTES NFR BLD: 33 % (ref 12–49)
MCH RBC QN AUTO: 28 PG (ref 26–34)
MCHC RBC AUTO-ENTMCNC: 30 G/DL (ref 30–36.5)
MCV RBC AUTO: 93.3 FL (ref 80–99)
MONOCYTES # BLD: 0.6 K/UL (ref 0–1)
MONOCYTES NFR BLD: 9 % (ref 5–13)
NEUTS SEG # BLD: 3.7 K/UL (ref 1.8–8)
NEUTS SEG NFR BLD: 54 % (ref 32–75)
NRBC # BLD: 0 K/UL (ref 0–0.01)
NRBC BLD-RTO: 0 PER 100 WBC
PLATELET # BLD AUTO: 186 K/UL (ref 150–400)
PMV BLD AUTO: 10.5 FL (ref 8.9–12.9)
POTASSIUM SERPL-SCNC: 4.3 MMOL/L (ref 3.5–5.1)
RBC # BLD AUTO: 3 M/UL (ref 3.8–5.2)
SERVICE CMNT-IMP: ABNORMAL
SERVICE CMNT-IMP: ABNORMAL
SODIUM SERPL-SCNC: 136 MMOL/L (ref 136–145)
WBC # BLD AUTO: 6.8 K/UL (ref 3.6–11)

## 2023-10-21 PROCEDURE — 85025 COMPLETE CBC W/AUTO DIFF WBC: CPT

## 2023-10-21 PROCEDURE — 82962 GLUCOSE BLOOD TEST: CPT

## 2023-10-21 PROCEDURE — 6360000002 HC RX W HCPCS: Performed by: STUDENT IN AN ORGANIZED HEALTH CARE EDUCATION/TRAINING PROGRAM

## 2023-10-21 PROCEDURE — 6370000000 HC RX 637 (ALT 250 FOR IP): Performed by: HOSPITALIST

## 2023-10-21 PROCEDURE — 2580000003 HC RX 258: Performed by: FAMILY MEDICINE

## 2023-10-21 PROCEDURE — 80048 BASIC METABOLIC PNL TOTAL CA: CPT

## 2023-10-21 PROCEDURE — 36415 COLL VENOUS BLD VENIPUNCTURE: CPT

## 2023-10-21 PROCEDURE — 6370000000 HC RX 637 (ALT 250 FOR IP): Performed by: FAMILY MEDICINE

## 2023-10-21 RX ORDER — METOCLOPRAMIDE 5 MG/1
10 TABLET ORAL
Qty: 84 TABLET | Refills: 0 | Status: SHIPPED | OUTPATIENT
Start: 2023-10-21 | End: 2023-11-04

## 2023-10-21 RX ORDER — MIDODRINE HYDROCHLORIDE 5 MG/1
5 TABLET ORAL 3 TIMES DAILY PRN
Qty: 30 TABLET | Refills: 0 | Status: SHIPPED | OUTPATIENT
Start: 2023-10-21

## 2023-10-21 RX ORDER — METOCLOPRAMIDE 5 MG/1
10 TABLET ORAL
Qty: 84 TABLET | Refills: 0 | Status: SHIPPED | OUTPATIENT
Start: 2023-10-21 | End: 2023-10-21 | Stop reason: SDUPTHER

## 2023-10-21 RX ADMIN — GENTAMICIN SULFATE: 1 CREAM TOPICAL at 09:55

## 2023-10-21 RX ADMIN — OXYCODONE HYDROCHLORIDE 5 MG: 5 TABLET ORAL at 15:18

## 2023-10-21 RX ADMIN — Medication 1 MG: at 09:54

## 2023-10-21 RX ADMIN — ROSUVASTATIN CALCIUM 40 MG: 10 TABLET, FILM COATED ORAL at 09:54

## 2023-10-21 RX ADMIN — METOCLOPRAMIDE 10 MG: 5 INJECTION, SOLUTION INTRAMUSCULAR; INTRAVENOUS at 06:56

## 2023-10-21 RX ADMIN — PANTOPRAZOLE SODIUM 40 MG: 40 TABLET, DELAYED RELEASE ORAL at 06:55

## 2023-10-21 RX ADMIN — PANTOPRAZOLE SODIUM 40 MG: 40 TABLET, DELAYED RELEASE ORAL at 15:08

## 2023-10-21 RX ADMIN — BENZONATATE 100 MG: 100 CAPSULE ORAL at 15:08

## 2023-10-21 RX ADMIN — SODIUM CHLORIDE, PRESERVATIVE FREE 10 ML: 5 INJECTION INTRAVENOUS at 06:58

## 2023-10-21 RX ADMIN — METOCLOPRAMIDE 10 MG: 5 INJECTION, SOLUTION INTRAMUSCULAR; INTRAVENOUS at 15:08

## 2023-10-21 RX ADMIN — Medication: at 09:54

## 2023-10-21 RX ADMIN — BENZONATATE 100 MG: 100 CAPSULE ORAL at 09:54

## 2023-10-21 RX ADMIN — DULOXETINE HYDROCHLORIDE 20 MG: 20 CAPSULE, DELAYED RELEASE ORAL at 09:54

## 2023-10-21 ASSESSMENT — PAIN DESCRIPTION - LOCATION: LOCATION: BACK

## 2023-10-21 ASSESSMENT — PAIN SCALES - GENERAL: PAINLEVEL_OUTOF10: 8

## 2023-10-21 NOTE — CARE COORDINATION
Transition of Care Plan:    RUR: 30%  Prior Level of Functioning:   Disposition: home with home health  Patient is known to [facility] SN/PT/OT orders noted 10/19/23. DME needed:   Transportation at discharge:   IM/IMM Medicare/ letter given:   Is patient a [place] and connected with [facility]? If yes, was [employer] transfer form completed and VA notified? Caregiver Contact: dtr Jackie Leonard  Discharge Caregiver contacted prior to discharge? Care Conference needed? Barriers to discharge:      Patient transfer to occur today when bed available. CM notifying [facility] ([phone]). CM spoke with [facility]) and confirmed receipt of recent [facility] orders (SN/PT/OT. Alexandra RN informed of room transfer for this patient.      Laura Daily, [address] [facility], [address] [place] [address]
Transition of Care Plan:    RUR: 31%   Prior Level of Functioning: Independent   Disposition: Home with Family Assistance and [address]:  [place]; MELODIE order placed   The pt's daughter reported that she requested  hard scripts of the pt's new scripts rather than sending the scripts to [facility]. She reported that she informed the physician this am.   Follow up appointments: PCP   DME needed: None \"Per conversation with the pt's daughter she reported that she was interested in the pt securing a W/C. This cm informed her that based on the recommendations given by therapy and the physician they are only recommending a RW. This cm informed her that she could follow up with the pt's PCP to obtain a order if she feels the pt still needs one upon discharge. She reported that she was ok with that and she reported that the pt has a rollator and would not require a RW. Transportation at discharge: Daughter  12-1pm    IM/IMM Medicare/[employer] letter given: Received   Caregiver ContactJuju Rocha (Child)   [phone]   Discharge Caregiver contacted prior to discharge?  Yes
Patient expects to be discharged to: House   History of falls? 0   Services At/After Discharge   Transition of Care Consult (CM Consult) [facility] Discharge Home Health;PT;OT;Nursing services    Resource Information Provided? No   Mode of Transport at Discharge Other (see comment)  (family)   Confirm Follow Up Transport Family     CM spoke with patient's daughter, verified demographics, insurance, and PCP. Patient lives with her son. Patient requires assistance with her ADLs/IADLs. Patient currently has a rollator. Patient uses [address] Patient was discharged home on 10/7/23 with [facility] HHPT/OT/SN. Patient returned to the hospital 10/14/23 and daughter stated upon discharge last visit discharge instructions were gone over with patient instead of with patient and patient's daughter. Patient went two days with no PO ABX as ishanokarielle was not aware of this prescription. 10/16/23 1351   Readmission Assessment   Number of Days since last admission? 8-30 days   Previous Disposition Home with Home Health   Who is being Yulissabeverly Farah  (daughter)   What was the patient's/caregiver's perception as to why they think they needed to return back to the hospital? Did not understand their medication regimen   Did you visit your Primary Care Physician after you left the hospital, before you returned this time? No   Why weren't you able to visit your PCP? Did not have an appointment   Did you see a specialist, such as Cardiac, Pulmonary, Orthopedic Physician, etc. after you left the hospital? No   Who advised the patient to return to the hospital? [facility] Staff   Does the patient report anything that got in the way of taking their medications? Yes   What reasons did they give?  Did not understand instructions   In our efforts to provide the best possible care to you and others like you, can you think of anything that we could have done to help you

## 2023-10-21 NOTE — FLOWSHEET NOTE
10/21/23 0630   Peritoneal Dialysis Catheter Mid lower abdomen   No placement date or time found. Catheter Location: Mid lower abdomen   Status Deaccessed   Site Condition Clean, dry, intact   Dressing Status Clean, dry & intact   Dressing Gauze   Date of Last Dressing Change 10/20/23   Dialysis Type Continuous cycling   Catheter Care Given Yes  (Two minute Alcavis scrub/soak performed prior to disconnection.)   Post-Treatment (Cycler)   Average Dwell Time (Hours:Minutes) 1:23   Lost Dwell Time (Hours:Minutes) :36   Effluent Appearance Clear;Yellow   PD Output (mL) 342 mL  (Idrain 3 ml + total  ml = net  ml)     Primary RN SBAR: Leoncio Armstrong RN    Comments: Arrived to disconnect patient for end of therapy. Patient found in \"drain 5 of 5\" with low UF alarm. Drain restarted and patient assisted to sit up on the bedside to facilitate draining. At end of therapy catheter disinfection performed. Patient disconnected and sterile mini cap placed. Catheter taped securely to the patient's abdomen. Primary RN at the bedside on my departure.

## 2023-10-21 NOTE — PROGRESS NOTES
Discharge instructions and medications reviewed with the patient and caregiver and appropriate educational materials and side effects teaching were provided.

## 2023-10-21 NOTE — PROGRESS NOTES
[facility]   [address], [address] [facility], [address] [facility]  Phone: [phone]   [phone]    [email or web address]     Nephrology Progress Note    Patient Name : Mary Mejia      : 1955     MRN : [id number]  Date of Admission : 10/12/2023  Date of Servive : 10/21/23    CC: Follow up for ESRD       Assessment and Plan   ESRD on PD:  - CCPD nightly at home, patient of Dr. Gladis Heath clinic  - Home Rx: 5X 2300 mL, 9.5-hour treatment time, 1000 mL icodextrin last fill  - Continue with current CCPD orders 1.5% solutions  - daily labs    Abd pain:  -No evidence of peritonitis  - 10/13 cell count normal     Anemia of CKD:  - Hgb 8.0, s/p transfusion  - EGD (-) for active bleed- biopsy pending  - transfuse for hgb <7  -  LIS 20K MWF    Hypokalemia-> hyperkalemia  -resolved     Hypertension:  - BP stable    CAD s/p CABG 23  DM  COPD  HLD     Interval History:  Patient seen and examined. Resting in bed. NAD. UF about 350cc overnight. Appears dry. No cp or sob reported    Review of Systems: Pertinent items are noted in HPI.     Current Medications:   Current Facility-Administered Medications   Medication Dose Route Frequency    epoetin mohan (EPOGEN;PROCRIT) injection 20,000 Units  20,000 Units SubCUTAneous Once per day on     midodrine (PROAMATINE) tablet 5 mg  5 mg Oral TID PRN    metoclopramide (REGLAN) injection 10 mg  10 mg IntraVENous TID AC    benzonatate (TESSALON) capsule 100 mg  100 mg Oral TID    0.9 % sodium chloride infusion   IntraVENous PRN    pantoprazole (PROTONIX) tablet 40 mg  40 mg Oral BID AC    albuterol sulfate HFA (PROVENTIL;VENTOLIN;PROAIR) 108 (90 Base) MCG/ACT inhaler 2 puff  2 puff Inhalation Q6H PRN    dextrose bolus 10% 125 mL  125 mL IntraVENous PRN    Or    dextrose bolus 10% 250 mL  250 mL IntraVENous PRN    glucagon injection 1 mg  1 mg SubCUTAneous PRN    dextrose 10 % infusion   IntraVENous Continuous PRN    insulin 1901 - 10/21 0700  In: -   Out: 682     Physical Examination:  General: NAD,Conversant   Neck:  Supple, no mass  Resp:  Clear anteriorly, normal respiratory effort,   CV:  Mild tachycardia  no  LE edema  GI:  Soft, NT, + BS, no HS megaly, PD exit site clean  Neurologic:  Non focal  Psych:             AAO x 3 appropriate affect   Skin:  No Rash      []    High complexity decision making was performed  []    Patient is at high-risk of decompensation with multiple organ involvement    Lab Data Personally Reviewed: I have reviewed all the pertinent labs, microbiology data and radiology studies during assessment. Labs:  Recent Labs     10/19/23  0456 10/21/23  0653   * 136   K 4.8 4.3    102   CO2 27 30   GLUCOSE 151* 126*   BUN 28* 29*   CREATININE 6.31* 6.54*   CALCIUM 8.5 7.6*         Recent Labs     10/19/23  0456 10/21/23  0653   WBC 5.8 6.8   RBC 2.81* 3.00*   HGB 8.0* 8.4*   HCT 26.2* 28.0*   MCV 93.2 93.3   MCH 28.5 28.0   MCHC 30.5 30.0   RDW 17.8* 17.6*    186   MPV 10.2 10.5       No results for input(s): \"TP\", \"ALB\", \"GLOB\", \"AML\" in the last 72 hours. Invalid input(s): \"SGOT\", \"GPT\", \"AP\", \"TBIL\", \"AMYP\", \"LPSE\", \"LAC\"    No results for input(s): \"INR\", \"APTT\" in the last 72 hours. Invalid input(s): \"PTP\"     No results for input(s): \"CPK\", \"CKMB\", \"TROPONINI\" in the last 72 hours. Invalid input(s): \"B-NP\"  Invalid input(s): \"PHI\", \"PCO2I\", \"PO2I\", \"FIO2I\"     Ventilator:       Microbiology:  No results found for: \"SDES\"  No components found for: \"CULT\"      I have reviewed the flowsheets. Chart and Pertinent Notes have been reviewed. No change in PMH ,family and social history from Consult note.       Radha Hawley MD  [facility]

## 2023-10-21 NOTE — DISCHARGE SUMMARY
Discharge Summary       PATIENT ID: Nubia Espinoza  MRN: [id number]   YOB: 1955    DATE OF ADMISSION: 10/12/2023 10:11 PM    DATE OF DISCHARGE: 10/21/23    PRIMARY CARE PROVIDER: Berkley West MD     ATTENDING PHYSICIAN: Yuniel Weller MD   DISCHARGING PROVIDER: Yuniel Weller MD    To contact this individual call [phone] and ask the  to page. If unavailable ask to be transferred the Adult Hospitalist Department. CONSULTATIONS: IP CONSULT TO NEPHROLOGY  IP WOUND CARE NURSE CONSULT TO EVAL  IP CONSULT TO GENERAL SURGERY  IP CONSULT TO DIETITIAN  IP CONSULT TO GI  IP CONSULT HOME HEALTH  IP CONSULT TO CASE MANAGEMENT  IP CONSULT TO GI  IP CONSULT HOME HEALTH    PROCEDURES/SURGERIES: Procedure(s):  EGD ESOPHAGOGASTRODUODENOSCOPY     ADMITTING DIAGNOSES & HOSPITAL COURSE:   HPI: Nubia Espinoza is a 70 y.o. female with hx of cad s/p cabg, esrd on PD, chronic recurrent nausea, vomiting and abdominal pain, hfref who presented to ed with complaints of abdominal pain. Patient states she has had constant, aching, generalized abdominal pain for the last week and a half. States symptoms were present at time of discharge from hospital.  She was recently admitted and underwent CABG. She endorses aching sternal pain which she states is unchanged since her surgical procedure. She has had 1 episode of nonbloody, nonbilious emesis. Endorses malaise, fatigue and some anorexia due to her aching abdominal pain. Patient was just admitted from 9/30 to 10/6 for syncopal, UTI ,hyponatremia and similar abd pain, vomiting, nausea. The patient denies any fever, chills, chest pain, cough, congestion, recent illness, palpitations, or dysuria/urgency or frequency.      Remarkable vitals on ER Presentation: vss  Labs Remarkable for: lactic acid 2.9, cr 7.71, hgb 7.5, UA: lg bld, wbc, 1+ bact  ER Images: ct chest abd et pelvis: no acute process  ER Rx: asa 324, cefepime, cymbalta   CT ABDOMEN that you may have questions about. DISPOSITION:    Home With:   OT  PT x HH  RN       Long term SNF/Inpatient Rehab    Independent/assisted living    Hospice    Other:       PATIENT CONDITION AT DISCHARGE:     Functional status    Poor     Deconditioned    x Independent      Cognition     Lucid    x Forgetful     Dementia      Catheters/lines (plus indication)    [place]     PICC     PEG    x None      Code status    x Full code     DNR      PHYSICAL EXAMINATION AT DISCHARGE:    General : alert x 3, awake, no acute distress,   HEENT: PEERL, EOMI, moist mucus membrane  Neck: supple, no JVD, no meningeal signs  Chest: Clear to auscultation bilaterally   CVS: S1 S2 heard, Capillary refill less than 2 seconds  Abd: soft/ Non tender, non distended, BS physiological,   Ext: no clubbing, no cyanosis, no edema, brisk 2+ DP pulses  Neuro/Psych: pleasant mood and affect, CN 2-12 grossly intact, sensory grossly within normal limit, Strength 5/5 in all extremities  Skin: warm     CHRONIC MEDICAL DIAGNOSES:  Principal Problem:    Elevated troponin  Resolved Problems:    * No resolved hospital problems.  *        Greater than 31 minutes were spent with the patient on counseling and coordination of care    Signed:   Natalee Martinez MD  10/21/2023  1:39 PM

## 2023-10-22 ENCOUNTER — HOME CARE VISIT (OUTPATIENT)
Facility: HOME HEALTH | Age: 68
End: 2023-10-22
Payer: MEDICARE

## 2023-10-22 PROCEDURE — G0299 HHS/HOSPICE OF RN EA 15 MIN: HCPCS

## 2023-10-24 ENCOUNTER — HOME CARE VISIT (OUTPATIENT)
Facility: HOME HEALTH | Age: 68
End: 2023-10-24
Payer: MEDICARE

## 2023-10-24 VITALS
TEMPERATURE: 97.7 F | SYSTOLIC BLOOD PRESSURE: 122 MMHG | HEART RATE: 70 BPM | OXYGEN SATURATION: 98 % | DIASTOLIC BLOOD PRESSURE: 58 MMHG | RESPIRATION RATE: 16 BRPM

## 2023-10-24 VITALS
BODY MASS INDEX: 26.72 KG/M2 | DIASTOLIC BLOOD PRESSURE: 70 MMHG | HEART RATE: 100 BPM | SYSTOLIC BLOOD PRESSURE: 118 MMHG | OXYGEN SATURATION: 99 % | TEMPERATURE: 97.3 F | RESPIRATION RATE: 18 BRPM | WEIGHT: 160.56 LBS

## 2023-10-24 VITALS
TEMPERATURE: 97.3 F | OXYGEN SATURATION: 99 % | HEART RATE: 100 BPM | SYSTOLIC BLOOD PRESSURE: 118 MMHG | RESPIRATION RATE: 18 BRPM | DIASTOLIC BLOOD PRESSURE: 70 MMHG

## 2023-10-24 PROCEDURE — G0151 HHCP-SERV OF PT,EA 15 MIN: HCPCS

## 2023-10-24 PROCEDURE — G0299 HHS/HOSPICE OF RN EA 15 MIN: HCPCS

## 2023-10-24 ASSESSMENT — ENCOUNTER SYMPTOMS
COUGH: 1
DYSPNEA ACTIVITY LEVEL: AFTER AMBULATING 10 - 20 FT
PAIN LOCATION - PAIN QUALITY: DULL, ACHY
BOWEL INCONTINENCE: 1
BOWEL INCONTINENCE: 1
PAIN LOCATION - PAIN QUALITY: ACHE
COUGH CHARACTERISTICS: PRODUCTIVE
DYSPNEA ACTIVITY LEVEL: AFTER AMBULATING LESS THAN 10 FT
DYSPNEA ACTIVITY LEVEL: AFTER AMBULATING LESS THAN 10 FT
STOOL DESCRIPTION: FORMED
PAIN LOCATION - PAIN QUALITY: ACHING

## 2023-10-24 NOTE — HOME HEALTH
Subjective: patient reports l hip pain  Falls since last visit No(if yes complete the Fall Tracking Form and include bsrifallreport):   Caregiver involvement changes: present  Home health supplies by type and quantity ordered/delivered this visit include: na    Clinician asked if patient has had any physician contact since last home care visit and patient states: NO  Clinician asked if patient has any new or changed medications and patient states:  NO   If Yes, were medications reconciled? N/A   Was the certifying physician notified of changes in medications? N/A     Clinical assessment (what this visit means for the patient overall and need for ongoing skilled care) and progress or lack of progress towards SPECIFIC goals: patient will continue to benefit from skilled nursing services for cp assessment and management, med teaching, dm teaching. Written Teaching Material Utilized: N/A    Interdisciplinary communication with:  Physician for the purpose of requesting oxy refill, requesting cough med, requesting rai order    Discharge planning as follows:  When goals are met    Specific plan for next visit: Educate on medications and cp disease process

## 2023-10-24 NOTE — HOME HEALTH
Reason for referral, PMH SUMMARY of clinical condition:Patient is a 69 y/o with pmh recent CABG, esrd on PD, chronic recurrent nausea, vomiting and abdominal pain,   Patient states she has had constant, aching, generalized abdominal pain for the last week and a half. CT scan negative abdomen but found to have elevated troponin level. [facility] services to continue education on medications and compliance check of medication. Meds that should have been completed are still in pill planner. Therapy to help regain strength and endurance. Clinical Assessment/Skilled reason for admission to home health - Need for compliance with DM managment, cardiac weights and bp reading. INcrease knowledge of medications nad when to take and when to hold    Diagnosis: Elevated Troponin    Subjective Im weraing my mask because I have a cough    Caregiver:  J Luis Hanks is primary caregiver and helps with medication management, meals, personal care. Son present today and when antonio did not answer questions about smoking he told on her. Medications reconciled and all medications are available in the home this visit. The following education was provided regarding medications: medication interactions and look alike medications:  Patient/CG able to demonstrate knowledge through teach back with 95 percent accuracy. notified of any discrepancies/medication interactions n/a. A list of reconciled medications has been uploaded to media. High risk med teaching was performed on High risk medication education as follows; ANTICOAGULANTS - Aspirin  Instructed pt/CG can select appropriate dose ( Medication/dose/frequency). No ASA or NSAIDS unless ordered by MD.  S & S excessive anticoagulation are: Pink/red urine, swelling/pain of joints,  tarry stools, unusual/excessive bruising or bleeding. Reviewed sharps precautions  (No razors, soft toothbrush, etc.).   Instructed  that falls, hitting head or bleeding that won't stop requires

## 2023-10-24 NOTE — HOME HEALTH
Subjective: Patient states she is feeling slow today. She feels happy to be home and feels like she is improving   Falls since last visit No(if yes complete the Fall Tracking Form and include bsrifallreport):   Caregiver involvement changes: None  Home health supplies by type and quantity ordered/delivered this visit include: None    Clinician asked if patient has had any physician contact since last home care visit and patient states: NO  Clinician asked if patient has any new or changed medications and patient states:  NO   If Yes, were medications reconciled? N/A   Was the certifying physician notified of changes in medications? N/A     Clinical assessment (what this visit means for the patient overall and need for ongoing skilled care) and progress or lack of progress towards SPECIFIC goals: Patient is a 68yo female who was admitted back to home health after recent hospitalization for abdominal pain. She lives in a 1-story house with [facility] and has family/friends that live with her to assist with daily activities. She demonstrates impaired balance/increased fall risk with Tinetti of 11/28, MAHC-10 8, and impaired strength with MMT of 3/5 for all mvmts. She benefits from 1d1; 2/w3 of skilled PT to improve impairments and ensure safety within home. Written Teaching Material Utilized: HEP    Interdisciplinary communication with: Josh Salmon and Khloe Garza MD; Rhiannon Mccarty LPN for the purpose of POC collaboration    Discharge planning as follows:  Will discharge when the patient has reached their maximum functional potential and maximum safety in their home    Specific plan for next visit: HEP and gait

## 2023-10-25 ENCOUNTER — HOME CARE VISIT (OUTPATIENT)
Facility: HOME HEALTH | Age: 68
End: 2023-10-25
Payer: MEDICARE

## 2023-10-25 VITALS
DIASTOLIC BLOOD PRESSURE: 76 MMHG | SYSTOLIC BLOOD PRESSURE: 122 MMHG | HEART RATE: 76 BPM | TEMPERATURE: 98.5 F | OXYGEN SATURATION: 98 %

## 2023-10-25 PROCEDURE — G0152 HHCP-SERV OF OT,EA 15 MIN: HCPCS

## 2023-10-25 ASSESSMENT — ENCOUNTER SYMPTOMS: PAIN LOCATION - PAIN QUALITY: SORE, THROBBING

## 2023-10-25 NOTE — HOME HEALTH
Reason for referral, [facility] SUMMARY of clinical condition: Pt is 70year old female admitted to home care initially for aftercare following surigcal procedure related to CABG; rehospitalized for weakness, pain, vomiting and re-infection; Pt returned home and then sent back to hospital for pain and discomfort. Pt at risk for additional rehospitalization due to uncontrolled pain, nausea, vomiting, etc. Occupational Therapy needed for ADL training, functional mobility, strengthening. Clinical Assessment/Skilled reason for admission to home health (What this means for the patient overall and need for ongoing skilled care):OT assessed pt for ADL performance indicating pt required mod assist for toileting, LB dressing; max assist for bathing, and min assist for UB dressing and grooming ADL. Barthel score of 40/100 indicating maximal dependence with some assistance from Bulgarian members. She presents with BUE strength of 3/5 MMT, functional reach of 3 inches however pt tolerance and participation limited by pain. Pt with increased weakness, impaired mobility and difficulty transferring from bed, couch, etc.  Pt would benefit from wheelchair however difficulty getting in touch with MD to leave message regarding wheelchair order from [employer]; application submitted through [employer] regarding need for w/c  Diagnosis: encounter following surgical procedure   Subjective (statement from pt/cg that is relative to why you are there): I hope to stay out of the hospital this time  Caregiver: daughter, family. Caregiver assists with: Medications, Meals, Bathing, ADL, IADL and Transportation Caregiver unable to assist with: NA. Caregiver is available Regularly Caregiver is present at this visit and did participate with clinician. Medications reconciled and all medications are available in the home this visit. The following education was provided regarding medications: medication interactions and look alike medications.

## 2023-10-26 ENCOUNTER — HOME CARE VISIT (OUTPATIENT)
Facility: HOME HEALTH | Age: 68
End: 2023-10-26
Payer: MEDICARE

## 2023-10-26 VITALS
HEART RATE: 106 BPM | TEMPERATURE: 97.6 F | OXYGEN SATURATION: 96 % | WEIGHT: 161 LBS | RESPIRATION RATE: 16 BRPM | DIASTOLIC BLOOD PRESSURE: 62 MMHG | SYSTOLIC BLOOD PRESSURE: 154 MMHG | BODY MASS INDEX: 26.79 KG/M2

## 2023-10-26 PROCEDURE — G0300 HHS/HOSPICE OF LPN EA 15 MIN: HCPCS

## 2023-10-26 ASSESSMENT — ENCOUNTER SYMPTOMS
BOWEL INCONTINENCE: 1
PAIN LOCATION - PAIN QUALITY: ACHE

## 2023-10-26 NOTE — HOME HEALTH
Subjective: \"I'm feeling alley slow today. \"  Falls since last visit No(if yes complete the Fall Tracking Form and include bsrifallreport):   Caregiver involvement changes: No  Home health supplies by type and quantity ordered/delivered this visit include: n/a    Clinician asked if patient has had any physician contact since last home care visit and patient states: YES  Clinician asked if patient has any new or changed medications and patient states:  NO   If Yes, were medications reconciled? N/A   Was the certifying physician notified of changes in medications? N/A     Clinical assessment (what this visit means for the patient overall and need for ongoing skilled care) and progress or lack of progress towards SPECIFIC goals: Pt at risk for rehospitalization r/t fall risk, infection, wound exacerbation, hyper/hypoglycemia, renal failure, complications r/t peritoneal dialysis. Wound healing goals not met. Written Teaching Material Utilized: N/A    Interdisciplinary communication with: Dr. Ray Campos MD Physician via In Basket for the purpose of notification of pt's elevated blood pressure and heart rate this visit    Discharge planning as follows:  When goals are met    Specific plan for next visit: Assessment, wound care, education as needed

## 2023-10-31 ENCOUNTER — TELEPHONE (OUTPATIENT)
Age: 68
End: 2023-10-31

## 2023-10-31 ENCOUNTER — HOME CARE VISIT (OUTPATIENT)
Facility: HOME HEALTH | Age: 68
End: 2023-10-31
Payer: MEDICARE

## 2023-10-31 VITALS
TEMPERATURE: 97.8 F | BODY MASS INDEX: 26.06 KG/M2 | DIASTOLIC BLOOD PRESSURE: 70 MMHG | WEIGHT: 156.6 LBS | HEART RATE: 97 BPM | OXYGEN SATURATION: 99 % | SYSTOLIC BLOOD PRESSURE: 152 MMHG

## 2023-10-31 PROCEDURE — G0158 HHC OT ASSISTANT EA 15: HCPCS

## 2023-10-31 PROCEDURE — G0157 HHC PT ASSISTANT EA 15: HCPCS

## 2023-10-31 PROCEDURE — G0300 HHS/HOSPICE OF LPN EA 15 MIN: HCPCS

## 2023-10-31 ASSESSMENT — ENCOUNTER SYMPTOMS: PAIN LOCATION - PAIN QUALITY: CRAMPING

## 2023-10-31 NOTE — TELEPHONE ENCOUNTER
Sun Fortune RN back and discussed her GI findings. Cardiac Surgery signed off on Mrs. Aguilar during her most recent hospitalization. During her last admission (discharged 10/21), GI was consulted and she underwent EGD which was concerning for gastritis. I recommended that they contact [facility] and update them. Additionally, I have requested that they obtain a PCP to manage her myriad of comorbidities given she is almost 2 months out from CABG.      Diego Worley NP  Cardiac Surgery

## 2023-11-01 ENCOUNTER — APPOINTMENT (OUTPATIENT)
Facility: HOSPITAL | Age: 68
DRG: 064 | End: 2023-11-01
Payer: MEDICARE

## 2023-11-01 ENCOUNTER — HOSPITAL ENCOUNTER (INPATIENT)
Facility: HOSPITAL | Age: 68
LOS: 6 days | Discharge: NURSING, REHAB OR LONG-TERM CARE | DRG: 064 | End: 2023-11-07
Attending: EMERGENCY MEDICINE | Admitting: STUDENT IN AN ORGANIZED HEALTH CARE EDUCATION/TRAINING PROGRAM
Payer: MEDICARE

## 2023-11-01 VITALS
RESPIRATION RATE: 16 BRPM | HEART RATE: 78 BPM | TEMPERATURE: 97.8 F | OXYGEN SATURATION: 97 % | DIASTOLIC BLOOD PRESSURE: 78 MMHG | SYSTOLIC BLOOD PRESSURE: 130 MMHG

## 2023-11-01 VITALS
TEMPERATURE: 97.4 F | DIASTOLIC BLOOD PRESSURE: 79 MMHG | OXYGEN SATURATION: 100 % | SYSTOLIC BLOOD PRESSURE: 130 MMHG | RESPIRATION RATE: 18 BRPM | HEART RATE: 83 BPM

## 2023-11-01 DIAGNOSIS — I63.9 ACUTE CVA (CEREBROVASCULAR ACCIDENT) (HCC): ICD-10-CM

## 2023-11-01 DIAGNOSIS — R29.90 STROKE-LIKE SYMPTOM: Primary | ICD-10-CM

## 2023-11-01 DIAGNOSIS — R29.810 FACIAL DROOP: ICD-10-CM

## 2023-11-01 LAB
ALBUMIN SERPL-MCNC: 2.2 G/DL (ref 3.5–5)
ALBUMIN/GLOB SERPL: 0.3 (ref 1.1–2.2)
ALP SERPL-CCNC: 228 U/L (ref 45–117)
ALT SERPL-CCNC: 14 U/L (ref 12–78)
ANION GAP SERPL CALC-SCNC: 9 MMOL/L (ref 5–15)
AST SERPL-CCNC: 19 U/L (ref 15–37)
BASOPHILS # BLD: 0.1 K/UL (ref 0–0.1)
BASOPHILS NFR BLD: 1 % (ref 0–1)
BILIRUB SERPL-MCNC: 0.6 MG/DL (ref 0.2–1)
BUN SERPL-MCNC: 27 MG/DL (ref 6–20)
BUN/CREAT SERPL: 4 (ref 12–20)
CALCIUM SERPL-MCNC: 7.5 MG/DL (ref 8.5–10.1)
CHLORIDE SERPL-SCNC: 96 MMOL/L (ref 97–108)
CO2 SERPL-SCNC: 25 MMOL/L (ref 21–32)
COMMENT:: NORMAL
CREAT SERPL-MCNC: 6.79 MG/DL (ref 0.55–1.02)
DIFFERENTIAL METHOD BLD: ABNORMAL
EOSINOPHIL # BLD: 0.2 K/UL (ref 0–0.4)
EOSINOPHIL NFR BLD: 2 % (ref 0–7)
ERYTHROCYTE [DISTWIDTH] IN BLOOD BY AUTOMATED COUNT: 19 % (ref 11.5–14.5)
GLOBULIN SER CALC-MCNC: 6.4 G/DL (ref 2–4)
GLUCOSE BLD STRIP.AUTO-MCNC: 184 MG/DL (ref 65–117)
GLUCOSE BLD STRIP.AUTO-MCNC: 232 MG/DL (ref 65–117)
GLUCOSE SERPL-MCNC: 224 MG/DL (ref 65–100)
HCT VFR BLD AUTO: 35.5 % (ref 35–47)
HGB BLD-MCNC: 11.2 G/DL (ref 11.5–16)
IMM GRANULOCYTES # BLD AUTO: 0.1 K/UL (ref 0–0.04)
IMM GRANULOCYTES NFR BLD AUTO: 1 % (ref 0–0.5)
INR PPP: 1 (ref 0.9–1.1)
LYMPHOCYTES # BLD: 2.3 K/UL (ref 0.8–3.5)
LYMPHOCYTES NFR BLD: 24 % (ref 12–49)
MCH RBC QN AUTO: 28.6 PG (ref 26–34)
MCHC RBC AUTO-ENTMCNC: 31.5 G/DL (ref 30–36.5)
MCV RBC AUTO: 90.6 FL (ref 80–99)
MONOCYTES # BLD: 0.7 K/UL (ref 0–1)
MONOCYTES NFR BLD: 7 % (ref 5–13)
NEUTS SEG # BLD: 6.4 K/UL (ref 1.8–8)
NEUTS SEG NFR BLD: 66 % (ref 32–75)
NRBC # BLD: 0 K/UL (ref 0–0.01)
NRBC BLD-RTO: 0 PER 100 WBC
PLATELET # BLD AUTO: 291 K/UL (ref 150–400)
PMV BLD AUTO: 9.5 FL (ref 8.9–12.9)
POTASSIUM SERPL-SCNC: 3.1 MMOL/L (ref 3.5–5.1)
PROT SERPL-MCNC: 8.6 G/DL (ref 6.4–8.2)
PROTHROMBIN TIME: 10.7 SEC (ref 9–11.1)
RBC # BLD AUTO: 3.92 M/UL (ref 3.8–5.2)
SERVICE CMNT-IMP: ABNORMAL
SERVICE CMNT-IMP: ABNORMAL
SODIUM SERPL-SCNC: 130 MMOL/L (ref 136–145)
SPECIMEN HOLD: NORMAL
TROPONIN I SERPL HS-MCNC: 78 NG/L (ref 0–51)
WBC # BLD AUTO: 9.7 K/UL (ref 3.6–11)

## 2023-11-01 PROCEDURE — 84484 ASSAY OF TROPONIN QUANT: CPT

## 2023-11-01 PROCEDURE — 2060000000 HC ICU INTERMEDIATE R&B

## 2023-11-01 PROCEDURE — APPNB30 APP NON BILLABLE TIME 0-30 MINS

## 2023-11-01 PROCEDURE — 6370000000 HC RX 637 (ALT 250 FOR IP): Performed by: INTERNAL MEDICINE

## 2023-11-01 PROCEDURE — 2580000003 HC RX 258: Performed by: INTERNAL MEDICINE

## 2023-11-01 PROCEDURE — 6360000002 HC RX W HCPCS: Performed by: INTERNAL MEDICINE

## 2023-11-01 PROCEDURE — 6370000000 HC RX 637 (ALT 250 FOR IP): Performed by: NURSE PRACTITIONER

## 2023-11-01 PROCEDURE — 85025 COMPLETE CBC W/AUTO DIFF WBC: CPT

## 2023-11-01 PROCEDURE — 82962 GLUCOSE BLOOD TEST: CPT

## 2023-11-01 PROCEDURE — 85610 PROTHROMBIN TIME: CPT

## 2023-11-01 PROCEDURE — 6370000000 HC RX 637 (ALT 250 FOR IP): Performed by: EMERGENCY MEDICINE

## 2023-11-01 PROCEDURE — 71045 X-RAY EXAM CHEST 1 VIEW: CPT

## 2023-11-01 PROCEDURE — 70450 CT HEAD/BRAIN W/O DYE: CPT

## 2023-11-01 PROCEDURE — 99285 EMERGENCY DEPT VISIT HI MDM: CPT

## 2023-11-01 PROCEDURE — 80053 COMPREHEN METABOLIC PANEL: CPT

## 2023-11-01 RX ORDER — METHOCARBAMOL 500 MG/1
500 TABLET, FILM COATED ORAL 3 TIMES DAILY
Status: DISCONTINUED | OUTPATIENT
Start: 2023-11-01 | End: 2023-11-07 | Stop reason: HOSPADM

## 2023-11-01 RX ORDER — SODIUM CHLORIDE 9 MG/ML
INJECTION, SOLUTION INTRAVENOUS PRN
Status: DISCONTINUED | OUTPATIENT
Start: 2023-11-01 | End: 2023-11-07 | Stop reason: HOSPADM

## 2023-11-01 RX ORDER — MONTELUKAST SODIUM 10 MG/1
10 TABLET ORAL NIGHTLY
Status: DISCONTINUED | OUTPATIENT
Start: 2023-11-01 | End: 2023-11-07 | Stop reason: HOSPADM

## 2023-11-01 RX ORDER — AMIODARONE HYDROCHLORIDE 200 MG/1
400 TABLET ORAL DAILY
Status: DISCONTINUED | OUTPATIENT
Start: 2023-11-02 | End: 2023-11-07 | Stop reason: HOSPADM

## 2023-11-01 RX ORDER — FOLIC ACID 1 MG/1
1 TABLET ORAL DAILY
Status: DISCONTINUED | OUTPATIENT
Start: 2023-11-02 | End: 2023-11-07 | Stop reason: HOSPADM

## 2023-11-01 RX ORDER — ASPIRIN 81 MG/1
81 TABLET, CHEWABLE ORAL DAILY
Status: DISCONTINUED | OUTPATIENT
Start: 2023-11-02 | End: 2023-11-07 | Stop reason: HOSPADM

## 2023-11-01 RX ORDER — DULOXETIN HYDROCHLORIDE 20 MG/1
20 CAPSULE, DELAYED RELEASE ORAL 2 TIMES DAILY
Status: DISCONTINUED | OUTPATIENT
Start: 2023-11-01 | End: 2023-11-07 | Stop reason: HOSPADM

## 2023-11-01 RX ORDER — IPRATROPIUM BROMIDE AND ALBUTEROL SULFATE 2.5; .5 MG/3ML; MG/3ML
1 SOLUTION RESPIRATORY (INHALATION) EVERY 4 HOURS PRN
Status: DISCONTINUED | OUTPATIENT
Start: 2023-11-01 | End: 2023-11-07 | Stop reason: HOSPADM

## 2023-11-01 RX ORDER — DEXTROSE MONOHYDRATE 100 MG/ML
INJECTION, SOLUTION INTRAVENOUS CONTINUOUS PRN
Status: DISCONTINUED | OUTPATIENT
Start: 2023-11-01 | End: 2023-11-07 | Stop reason: HOSPADM

## 2023-11-01 RX ORDER — PANTOPRAZOLE SODIUM 40 MG/1
40 TABLET, DELAYED RELEASE ORAL
Status: DISCONTINUED | OUTPATIENT
Start: 2023-11-02 | End: 2023-11-01

## 2023-11-01 RX ORDER — POLYETHYLENE GLYCOL 3350 17 G/17G
17 POWDER, FOR SOLUTION ORAL DAILY PRN
Status: DISCONTINUED | OUTPATIENT
Start: 2023-11-01 | End: 2023-11-07 | Stop reason: HOSPADM

## 2023-11-01 RX ORDER — ASPIRIN 81 MG/1
81 TABLET, CHEWABLE ORAL ONCE
Status: COMPLETED | OUTPATIENT
Start: 2023-11-01 | End: 2023-11-01

## 2023-11-01 RX ORDER — PANTOPRAZOLE SODIUM 40 MG/1
40 TABLET, DELAYED RELEASE ORAL
Status: DISCONTINUED | OUTPATIENT
Start: 2023-11-02 | End: 2023-11-07 | Stop reason: HOSPADM

## 2023-11-01 RX ORDER — INSULIN LISPRO 100 [IU]/ML
0-4 INJECTION, SOLUTION INTRAVENOUS; SUBCUTANEOUS NIGHTLY
Status: DISCONTINUED | OUTPATIENT
Start: 2023-11-01 | End: 2023-11-07 | Stop reason: HOSPADM

## 2023-11-01 RX ORDER — CLOPIDOGREL BISULFATE 75 MG/1
75 TABLET ORAL ONCE
Status: COMPLETED | OUTPATIENT
Start: 2023-11-01 | End: 2023-11-01

## 2023-11-01 RX ORDER — POTASSIUM CHLORIDE 7.45 MG/ML
10 INJECTION INTRAVENOUS
Status: COMPLETED | OUTPATIENT
Start: 2023-11-01 | End: 2023-11-02

## 2023-11-01 RX ORDER — ROSUVASTATIN CALCIUM 40 MG/1
40 TABLET, COATED ORAL DAILY
Status: DISCONTINUED | OUTPATIENT
Start: 2023-11-02 | End: 2023-11-07 | Stop reason: HOSPADM

## 2023-11-01 RX ORDER — SODIUM CHLORIDE 0.9 % (FLUSH) 0.9 %
5-40 SYRINGE (ML) INJECTION EVERY 12 HOURS SCHEDULED
Status: DISCONTINUED | OUTPATIENT
Start: 2023-11-01 | End: 2023-11-07 | Stop reason: HOSPADM

## 2023-11-01 RX ORDER — INSULIN GLARGINE 100 [IU]/ML
20 INJECTION, SOLUTION SUBCUTANEOUS NIGHTLY
Status: DISCONTINUED | OUTPATIENT
Start: 2023-11-01 | End: 2023-11-07 | Stop reason: HOSPADM

## 2023-11-01 RX ORDER — ONDANSETRON 4 MG/1
4 TABLET, ORALLY DISINTEGRATING ORAL EVERY 8 HOURS PRN
Status: DISCONTINUED | OUTPATIENT
Start: 2023-11-01 | End: 2023-11-07 | Stop reason: HOSPADM

## 2023-11-01 RX ORDER — FUROSEMIDE 40 MG/1
80 TABLET ORAL DAILY
Status: DISCONTINUED | OUTPATIENT
Start: 2023-11-02 | End: 2023-11-07 | Stop reason: HOSPADM

## 2023-11-01 RX ORDER — FUROSEMIDE 80 MG
TABLET ORAL
COMMUNITY

## 2023-11-01 RX ORDER — GENTAMICIN SULFATE 1 MG/G
CREAM TOPICAL DAILY PRN
Status: DISCONTINUED | OUTPATIENT
Start: 2023-11-01 | End: 2023-11-07 | Stop reason: HOSPADM

## 2023-11-01 RX ORDER — HEPARIN SODIUM 5000 [USP'U]/ML
5000 INJECTION, SOLUTION INTRAVENOUS; SUBCUTANEOUS EVERY 8 HOURS SCHEDULED
Status: DISCONTINUED | OUTPATIENT
Start: 2023-11-01 | End: 2023-11-07 | Stop reason: HOSPADM

## 2023-11-01 RX ORDER — AMLODIPINE BESYLATE 10 MG/1
TABLET ORAL
Status: ON HOLD | COMMUNITY
End: 2023-11-07 | Stop reason: HOSPADM

## 2023-11-01 RX ORDER — SODIUM CHLORIDE, SODIUM LACTATE, CALCIUM CHLORIDE, MAGNESIUM CHLORIDE AND DEXTROSE 2.5; 538; 448; 18.3; 5.08 G/100ML; MG/100ML; MG/100ML; MG/100ML; MG/100ML
18000 INJECTION, SOLUTION INTRAPERITONEAL NIGHTLY
Status: DISCONTINUED | OUTPATIENT
Start: 2023-11-01 | End: 2023-11-02

## 2023-11-01 RX ORDER — INSULIN LISPRO 100 [IU]/ML
0-8 INJECTION, SOLUTION INTRAVENOUS; SUBCUTANEOUS
Status: DISCONTINUED | OUTPATIENT
Start: 2023-11-02 | End: 2023-11-07 | Stop reason: HOSPADM

## 2023-11-01 RX ORDER — OXYCODONE HYDROCHLORIDE 5 MG/1
5 TABLET ORAL EVERY 6 HOURS PRN
Status: DISCONTINUED | OUTPATIENT
Start: 2023-11-01 | End: 2023-11-07 | Stop reason: HOSPADM

## 2023-11-01 RX ORDER — POTASSIUM CHLORIDE 1.5 G/1.58G
POWDER, FOR SOLUTION ORAL
COMMUNITY
Start: 2023-08-22

## 2023-11-01 RX ORDER — DULAGLUTIDE 0.75 MG/.5ML
INJECTION, SOLUTION SUBCUTANEOUS
COMMUNITY

## 2023-11-01 RX ORDER — HYDRALAZINE HYDROCHLORIDE 50 MG/1
TABLET, FILM COATED ORAL
Status: ON HOLD | COMMUNITY
End: 2023-11-07 | Stop reason: HOSPADM

## 2023-11-01 RX ORDER — LANOLIN ALCOHOL/MO/W.PET/CERES
6 CREAM (GRAM) TOPICAL NIGHTLY PRN
Status: DISCONTINUED | OUTPATIENT
Start: 2023-11-01 | End: 2023-11-07 | Stop reason: HOSPADM

## 2023-11-01 RX ORDER — POTASSIUM CHLORIDE 750 MG/1
40 TABLET, FILM COATED, EXTENDED RELEASE ORAL ONCE
Status: COMPLETED | OUTPATIENT
Start: 2023-11-01 | End: 2023-11-01

## 2023-11-01 RX ORDER — OMEPRAZOLE 20 MG/1
CAPSULE, DELAYED RELEASE ORAL
Status: ON HOLD | COMMUNITY
Start: 2023-08-18 | End: 2023-11-07 | Stop reason: HOSPADM

## 2023-11-01 RX ORDER — SODIUM CHLORIDE 0.9 % (FLUSH) 0.9 %
5-40 SYRINGE (ML) INJECTION PRN
Status: DISCONTINUED | OUTPATIENT
Start: 2023-11-01 | End: 2023-11-07 | Stop reason: HOSPADM

## 2023-11-01 RX ORDER — CARVEDILOL 6.25 MG/1
6.25 TABLET ORAL 2 TIMES DAILY WITH MEALS
Status: DISCONTINUED | OUTPATIENT
Start: 2023-11-02 | End: 2023-11-07 | Stop reason: HOSPADM

## 2023-11-01 RX ORDER — ONDANSETRON 2 MG/ML
4 INJECTION INTRAMUSCULAR; INTRAVENOUS EVERY 6 HOURS PRN
Status: DISCONTINUED | OUTPATIENT
Start: 2023-11-01 | End: 2023-11-07 | Stop reason: HOSPADM

## 2023-11-01 RX ADMIN — POTASSIUM CHLORIDE 40 MEQ: 750 TABLET, FILM COATED, EXTENDED RELEASE ORAL at 23:18

## 2023-11-01 RX ADMIN — Medication 6 MG: at 22:57

## 2023-11-01 RX ADMIN — ASPIRIN 81 MG: 81 TABLET, CHEWABLE ORAL at 18:49

## 2023-11-01 RX ADMIN — SODIUM CHLORIDE, PRESERVATIVE FREE 10 ML: 5 INJECTION INTRAVENOUS at 22:57

## 2023-11-01 RX ADMIN — POTASSIUM CHLORIDE 10 MEQ: 7.46 INJECTION, SOLUTION INTRAVENOUS at 23:16

## 2023-11-01 RX ADMIN — HEPARIN SODIUM 5000 UNITS: 5000 INJECTION INTRAVENOUS; SUBCUTANEOUS at 23:16

## 2023-11-01 RX ADMIN — CLOPIDOGREL BISULFATE 75 MG: 75 TABLET ORAL at 18:49

## 2023-11-01 RX ADMIN — DULOXETINE HYDROCHLORIDE 20 MG: 20 CAPSULE, DELAYED RELEASE ORAL at 23:18

## 2023-11-01 RX ADMIN — INSULIN GLARGINE 20 UNITS: 100 INJECTION, SOLUTION SUBCUTANEOUS at 23:16

## 2023-11-01 RX ADMIN — METHOCARBAMOL TABLETS 500 MG: 500 TABLET, COATED ORAL at 22:57

## 2023-11-01 RX ADMIN — MONTELUKAST 10 MG: 10 TABLET, FILM COATED ORAL at 22:57

## 2023-11-01 ASSESSMENT — ENCOUNTER SYMPTOMS: PAIN LOCATION - PAIN QUALITY: ACHING

## 2023-11-01 NOTE — ED NOTES
Tele neuro on screen in ct with this nurse and Neuro NP [place]. Derek Culver RN  11/01/23 04117 [facility], [address] [place] [address]  11/01/23 2393

## 2023-11-01 NOTE — ED PROVIDER NOTES
[facility] EMERGENCY DEP  EMERGENCY DEPARTMENT ENCOUNTER      Pt Name: Fidencio Montoya  MRN: [id number]  [address] [place] 1955  Date of evaluation: 11/1/2023  Provider: Karoline Del Valle DO    CHIEF COMPLAINT       Chief Complaint   Patient presents with    Cerebrovascular Accident         HISTORY OF PRESENT ILLNESS   (Location/Symptom, Timing/Onset, Context/Setting, Quality, Duration, Modifying Factors, Severity)  Note limiting factors. 69-year-old female comes in for possible stroke symptoms. Last known well around 330. Apparently there is family member with the patient and somebody was talking to her on the phone around 3:00 and she was described as speaking normally and moving around the house with her walker normally. At around 5 PM, family members awoke to find the patient slightly confused with some slurring of speech and what they thought to be some left-sided facial droop. Here, patient has some signs of left-sided facial droop on arrival with some slurring of her words but no other focal findings or initially immediate. Patient does have a contrast allergy            Review of External Medical Records:     Nursing Notes were reviewed. REVIEW OF SYSTEMS    (2-9 systems for level 4, 10 or more for level 5)     Review of Systems    Except as noted above the remainder of the review of systems was reviewed and negative.        PAST MEDICAL HISTORY     Past Medical History:   Diagnosis Date    Asthma     CAD (coronary artery disease)     CHF (congestive heart failure) ([facility])     Dr Booth    Chronic kidney disease     was on HD M-W-F Eduardo Laburnum [phone]- now on PD    COPD (chronic obstructive pulmonary disease) ([address])     PCP manages    Diabetes ([address])     DM2    ESRD on peritoneal dialysis ([address])     Eduardo (Tammie-PD nurse) Celeste Slider [phone]- now on PD    GERD (gastroesophageal reflux disease)     History of blood transfusion     Hyperlipidemia     Hypertension          SURGICAL HISTORY       Past Surgical

## 2023-11-01 NOTE — ED TRIAGE NOTES
Pt arrived via EMS from home with cc of slurred speech and left facial droop. Pt's LKW 3:00 pm. Per EMS  and [facility] 14. Pt has allergy to contrast dye.

## 2023-11-02 ENCOUNTER — APPOINTMENT (OUTPATIENT)
Facility: HOSPITAL | Age: 68
DRG: 064 | End: 2023-11-02
Payer: MEDICARE

## 2023-11-02 ENCOUNTER — HOME CARE VISIT (OUTPATIENT)
Dept: HOME HEALTH SERVICES | Facility: HOME HEALTH | Age: 68
End: 2023-11-02
Payer: MEDICARE

## 2023-11-02 LAB
ALBUMIN SERPL-MCNC: 1.7 G/DL (ref 3.5–5)
ALBUMIN/GLOB SERPL: 0.4 (ref 1.1–2.2)
ALP SERPL-CCNC: 174 U/L (ref 45–117)
ALT SERPL-CCNC: 11 U/L (ref 12–78)
ANION GAP SERPL CALC-SCNC: 12 MMOL/L (ref 5–15)
APPEARANCE FLD: CLEAR
AST SERPL-CCNC: 13 U/L (ref 15–37)
BASOPHILS # BLD: 0.1 K/UL (ref 0–0.1)
BASOPHILS NFR BLD: 1 % (ref 0–1)
BILIRUB SERPL-MCNC: 0.5 MG/DL (ref 0.2–1)
BUN SERPL-MCNC: 30 MG/DL (ref 6–20)
BUN/CREAT SERPL: 4 (ref 12–20)
CALCIUM SERPL-MCNC: 6.9 MG/DL (ref 8.5–10.1)
CHLORIDE SERPL-SCNC: 101 MMOL/L (ref 97–108)
CHOLEST SERPL-MCNC: 90 MG/DL
CO2 SERPL-SCNC: 24 MMOL/L (ref 21–32)
COLOR FLD: COLORLESS
CREAT SERPL-MCNC: 7.01 MG/DL (ref 0.55–1.02)
CRP SERPL-MCNC: 5.02 MG/DL (ref 0–0.6)
DIFFERENTIAL METHOD BLD: ABNORMAL
EOSINOPHIL # BLD: 0.2 K/UL (ref 0–0.4)
EOSINOPHIL NFR BLD: 3 % (ref 0–7)
ERYTHROCYTE [DISTWIDTH] IN BLOOD BY AUTOMATED COUNT: 18.5 % (ref 11.5–14.5)
EST. AVERAGE GLUCOSE BLD GHB EST-MCNC: 143 MG/DL
FOLATE SERPL-MCNC: 11.4 NG/ML (ref 5–21)
GLOBULIN SER CALC-MCNC: 4.3 G/DL (ref 2–4)
GLUCOSE BLD STRIP.AUTO-MCNC: 103 MG/DL (ref 65–117)
GLUCOSE BLD STRIP.AUTO-MCNC: 125 MG/DL (ref 65–117)
GLUCOSE BLD STRIP.AUTO-MCNC: 126 MG/DL (ref 65–117)
GLUCOSE BLD STRIP.AUTO-MCNC: 141 MG/DL (ref 65–117)
GLUCOSE BLD STRIP.AUTO-MCNC: 229 MG/DL (ref 65–117)
GLUCOSE SERPL-MCNC: 94 MG/DL (ref 65–100)
HBA1C MFR BLD: 6.6 % (ref 4–5.6)
HCT VFR BLD AUTO: 29 % (ref 35–47)
HDLC SERPL-MCNC: 31 MG/DL
HDLC SERPL: 2.9 (ref 0–5)
HGB BLD-MCNC: 8.9 G/DL (ref 11.5–16)
IMM GRANULOCYTES # BLD AUTO: 0 K/UL (ref 0–0.04)
IMM GRANULOCYTES NFR BLD AUTO: 1 % (ref 0–0.5)
LDLC SERPL CALC-MCNC: 37.4 MG/DL (ref 0–100)
LYMPHOCYTES # BLD: 2.7 K/UL (ref 0.8–3.5)
LYMPHOCYTES NFR BLD: 36 % (ref 12–49)
MAGNESIUM SERPL-MCNC: 1.5 MG/DL (ref 1.6–2.4)
MCH RBC QN AUTO: 28.3 PG (ref 26–34)
MCHC RBC AUTO-ENTMCNC: 30.7 G/DL (ref 30–36.5)
MCV RBC AUTO: 92.1 FL (ref 80–99)
MONOCYTES # BLD: 0.7 K/UL (ref 0–1)
MONOCYTES NFR BLD: 9 % (ref 5–13)
NEUTS SEG # BLD: 3.7 K/UL (ref 1.8–8)
NEUTS SEG NFR BLD: 50 % (ref 32–75)
NRBC # BLD: 0 K/UL (ref 0–0.01)
NRBC BLD-RTO: 0 PER 100 WBC
NT PRO BNP: ABNORMAL PG/ML
NUC CELL # FLD: 1 /CU MM
PHOSPHATE SERPL-MCNC: 4.3 MG/DL (ref 2.6–4.7)
PLATELET # BLD AUTO: 266 K/UL (ref 150–400)
PMV BLD AUTO: 9.8 FL (ref 8.9–12.9)
POTASSIUM SERPL-SCNC: 2.4 MMOL/L (ref 3.5–5.1)
PROT SERPL-MCNC: 6 G/DL (ref 6.4–8.2)
RBC # BLD AUTO: 3.15 M/UL (ref 3.8–5.2)
RBC # FLD: 0 /CU MM
SERVICE CMNT-IMP: ABNORMAL
SERVICE CMNT-IMP: NORMAL
SODIUM SERPL-SCNC: 137 MMOL/L (ref 136–145)
SPECIMEN SOURCE FLD: NORMAL
TRIGL SERPL-MCNC: 108 MG/DL
TROPONIN I SERPL HS-MCNC: 84 NG/L (ref 0–51)
TSH SERPL DL<=0.05 MIU/L-ACNC: 4 UIU/ML (ref 0.36–3.74)
VIT B12 SERPL-MCNC: 735 PG/ML (ref 193–986)
VLDLC SERPL CALC-MCNC: 21.6 MG/DL
WBC # BLD AUTO: 7.5 K/UL (ref 3.6–11)

## 2023-11-02 PROCEDURE — 84484 ASSAY OF TROPONIN QUANT: CPT

## 2023-11-02 PROCEDURE — 6360000002 HC RX W HCPCS: Performed by: INTERNAL MEDICINE

## 2023-11-02 PROCEDURE — 97165 OT EVAL LOW COMPLEX 30 MIN: CPT | Performed by: OCCUPATIONAL THERAPIST

## 2023-11-02 PROCEDURE — 6370000000 HC RX 637 (ALT 250 FOR IP)

## 2023-11-02 PROCEDURE — 3E1M39Z IRRIGATION OF PERITONEAL CAVITY USING DIALYSATE, PERCUTANEOUS APPROACH: ICD-10-PCS | Performed by: NURSE PRACTITIONER

## 2023-11-02 PROCEDURE — 82962 GLUCOSE BLOOD TEST: CPT

## 2023-11-02 PROCEDURE — 86140 C-REACTIVE PROTEIN: CPT

## 2023-11-02 PROCEDURE — 97116 GAIT TRAINING THERAPY: CPT

## 2023-11-02 PROCEDURE — 92610 EVALUATE SWALLOWING FUNCTION: CPT

## 2023-11-02 PROCEDURE — 2580000003 HC RX 258: Performed by: NURSE PRACTITIONER

## 2023-11-02 PROCEDURE — 6370000000 HC RX 637 (ALT 250 FOR IP): Performed by: HOSPITALIST

## 2023-11-02 PROCEDURE — 6370000000 HC RX 637 (ALT 250 FOR IP): Performed by: INTERNAL MEDICINE

## 2023-11-02 PROCEDURE — 80061 LIPID PANEL: CPT

## 2023-11-02 PROCEDURE — 2580000003 HC RX 258: Performed by: INTERNAL MEDICINE

## 2023-11-02 PROCEDURE — 84443 ASSAY THYROID STIM HORMONE: CPT

## 2023-11-02 PROCEDURE — 82746 ASSAY OF FOLIC ACID SERUM: CPT

## 2023-11-02 PROCEDURE — APPNB30 APP NON BILLABLE TIME 0-30 MINS

## 2023-11-02 PROCEDURE — 97162 PT EVAL MOD COMPLEX 30 MIN: CPT

## 2023-11-02 PROCEDURE — 6370000000 HC RX 637 (ALT 250 FOR IP): Performed by: NURSE PRACTITIONER

## 2023-11-02 PROCEDURE — 80053 COMPREHEN METABOLIC PANEL: CPT

## 2023-11-02 PROCEDURE — 87070 CULTURE OTHR SPECIMN AEROBIC: CPT

## 2023-11-02 PROCEDURE — 82607 VITAMIN B-12: CPT

## 2023-11-02 PROCEDURE — 97535 SELF CARE MNGMENT TRAINING: CPT | Performed by: OCCUPATIONAL THERAPIST

## 2023-11-02 PROCEDURE — 99223 1ST HOSP IP/OBS HIGH 75: CPT | Performed by: PSYCHIATRY & NEUROLOGY

## 2023-11-02 PROCEDURE — 36415 COLL VENOUS BLD VENIPUNCTURE: CPT

## 2023-11-02 PROCEDURE — 83735 ASSAY OF MAGNESIUM: CPT

## 2023-11-02 PROCEDURE — 87205 SMEAR GRAM STAIN: CPT

## 2023-11-02 PROCEDURE — 89050 BODY FLUID CELL COUNT: CPT

## 2023-11-02 PROCEDURE — 70551 MRI BRAIN STEM W/O DYE: CPT

## 2023-11-02 PROCEDURE — 83036 HEMOGLOBIN GLYCOSYLATED A1C: CPT

## 2023-11-02 PROCEDURE — 87015 SPECIMEN INFECT AGNT CONCNTJ: CPT

## 2023-11-02 PROCEDURE — 85025 COMPLETE CBC W/AUTO DIFF WBC: CPT

## 2023-11-02 PROCEDURE — 90945 DIALYSIS ONE EVALUATION: CPT

## 2023-11-02 PROCEDURE — 70547 MR ANGIOGRAPHY NECK W/O DYE: CPT

## 2023-11-02 PROCEDURE — 84100 ASSAY OF PHOSPHORUS: CPT

## 2023-11-02 PROCEDURE — 70544 MR ANGIOGRAPHY HEAD W/O DYE: CPT

## 2023-11-02 PROCEDURE — 83880 ASSAY OF NATRIURETIC PEPTIDE: CPT

## 2023-11-02 PROCEDURE — 2060000000 HC ICU INTERMEDIATE R&B

## 2023-11-02 RX ORDER — SODIUM CHLORIDE, SODIUM LACTATE, CALCIUM CHLORIDE, MAGNESIUM CHLORIDE AND DEXTROSE 2.5; 538; 448; 18.3; 5.08 G/100ML; MG/100ML; MG/100ML; MG/100ML; MG/100ML
6000 INJECTION, SOLUTION INTRAPERITONEAL
Status: DISCONTINUED | OUTPATIENT
Start: 2023-11-02 | End: 2023-11-07 | Stop reason: HOSPADM

## 2023-11-02 RX ORDER — NICOTINE 21 MG/24HR
1 PATCH, TRANSDERMAL 24 HOURS TRANSDERMAL DAILY
Status: DISCONTINUED | OUTPATIENT
Start: 2023-11-02 | End: 2023-11-07 | Stop reason: HOSPADM

## 2023-11-02 RX ORDER — CASTOR OIL AND BALSAM, PERU 788; 87 MG/G; MG/G
OINTMENT TOPICAL EVERY 12 HOURS
Status: DISCONTINUED | OUTPATIENT
Start: 2023-11-02 | End: 2023-11-07 | Stop reason: HOSPADM

## 2023-11-02 RX ORDER — POTASSIUM CHLORIDE 750 MG/1
40 TABLET, FILM COATED, EXTENDED RELEASE ORAL ONCE
Status: COMPLETED | OUTPATIENT
Start: 2023-11-02 | End: 2023-11-02

## 2023-11-02 RX ADMIN — HEPARIN SODIUM 5000 UNITS: 5000 INJECTION INTRAVENOUS; SUBCUTANEOUS at 21:54

## 2023-11-02 RX ADMIN — METHOCARBAMOL TABLETS 500 MG: 500 TABLET, COATED ORAL at 10:29

## 2023-11-02 RX ADMIN — OXYCODONE HYDROCHLORIDE 5 MG: 5 TABLET ORAL at 21:24

## 2023-11-02 RX ADMIN — HEPARIN SODIUM 5000 UNITS: 5000 INJECTION INTRAVENOUS; SUBCUTANEOUS at 15:06

## 2023-11-02 RX ADMIN — DULOXETINE HYDROCHLORIDE 20 MG: 20 CAPSULE, DELAYED RELEASE ORAL at 21:14

## 2023-11-02 RX ADMIN — PANTOPRAZOLE SODIUM 40 MG: 40 TABLET, DELAYED RELEASE ORAL at 06:20

## 2023-11-02 RX ADMIN — POTASSIUM CHLORIDE 10 MEQ: 7.46 INJECTION, SOLUTION INTRAVENOUS at 00:32

## 2023-11-02 RX ADMIN — Medication 1 MG: at 08:25

## 2023-11-02 RX ADMIN — METHOCARBAMOL TABLETS 500 MG: 500 TABLET, COATED ORAL at 21:15

## 2023-11-02 RX ADMIN — Medication: at 21:15

## 2023-11-02 RX ADMIN — INSULIN GLARGINE 20 UNITS: 100 INJECTION, SOLUTION SUBCUTANEOUS at 21:15

## 2023-11-02 RX ADMIN — ASPIRIN 81 MG CHEWABLE TABLET 81 MG: 81 TABLET CHEWABLE at 08:26

## 2023-11-02 RX ADMIN — FUROSEMIDE 80 MG: 40 TABLET ORAL at 08:25

## 2023-11-02 RX ADMIN — SODIUM CHLORIDE, PRESERVATIVE FREE 10 ML: 5 INJECTION INTRAVENOUS at 08:30

## 2023-11-02 RX ADMIN — MONTELUKAST 10 MG: 10 TABLET, FILM COATED ORAL at 21:15

## 2023-11-02 RX ADMIN — GENTAMICIN SULFATE: 1 CREAM TOPICAL at 15:54

## 2023-11-02 RX ADMIN — Medication 6 MG: at 21:24

## 2023-11-02 RX ADMIN — SODIUM CHLORIDE, PRESERVATIVE FREE 10 ML: 5 INJECTION INTRAVENOUS at 21:15

## 2023-11-02 RX ADMIN — SODIUM CHLORIDE, SODIUM LACTATE, CALCIUM CHLORIDE, MAGNESIUM CHLORIDE AND DEXTROSE 6000 ML: 2.5; 538; 448; 18.3; 5.08 INJECTION, SOLUTION INTRAPERITONEAL at 15:53

## 2023-11-02 RX ADMIN — HEPARIN SODIUM 5000 UNITS: 5000 INJECTION INTRAVENOUS; SUBCUTANEOUS at 06:20

## 2023-11-02 RX ADMIN — METHOCARBAMOL TABLETS 500 MG: 500 TABLET, COATED ORAL at 15:06

## 2023-11-02 RX ADMIN — ROSUVASTATIN 40 MG: 40 TABLET, FILM COATED ORAL at 08:25

## 2023-11-02 RX ADMIN — ONDANSETRON 4 MG: 4 TABLET, ORALLY DISINTEGRATING ORAL at 23:10

## 2023-11-02 RX ADMIN — Medication: at 13:25

## 2023-11-02 RX ADMIN — CARVEDILOL 6.25 MG: 6.25 TABLET, FILM COATED ORAL at 17:13

## 2023-11-02 RX ADMIN — SODIUM CHLORIDE, SODIUM LACTATE, CALCIUM CHLORIDE, MAGNESIUM CHLORIDE AND DEXTROSE 18000 ML: 2.5; 538; 448; 18.3; 5.08 INJECTION, SOLUTION INTRAPERITONEAL at 02:45

## 2023-11-02 RX ADMIN — POTASSIUM CHLORIDE 40 MEQ: 750 TABLET, FILM COATED, EXTENDED RELEASE ORAL at 07:29

## 2023-11-02 RX ADMIN — CARVEDILOL 6.25 MG: 6.25 TABLET, FILM COATED ORAL at 08:25

## 2023-11-02 RX ADMIN — DULOXETINE HYDROCHLORIDE 20 MG: 20 CAPSULE, DELAYED RELEASE ORAL at 08:25

## 2023-11-02 RX ADMIN — PANTOPRAZOLE SODIUM 40 MG: 40 TABLET, DELAYED RELEASE ORAL at 15:06

## 2023-11-02 RX ADMIN — AMIODARONE HYDROCHLORIDE 400 MG: 200 TABLET ORAL at 08:25

## 2023-11-02 ASSESSMENT — PAIN SCALES - GENERAL: PAINLEVEL_OUTOF10: 0

## 2023-11-02 NOTE — WOUND CARE
Wound Care Note:     New consult placed by MD request for pressure ulcer wounds    Chart shows:  Admitted for acute CVA  Past Medical History:   Diagnosis Date    Asthma     CAD (coronary artery disease)     CHF (congestive heart failure) ([address])     Dr Booth    Chronic kidney disease     was on HD M-W-F Eduardo Michaeltania [phone]- now on PD    COPD (chronic obstructive pulmonary disease) ([address])     PCP manages    Diabetes ([address])     DM2    ESRD on peritoneal dialysis ([address])     Eduardo (Tammie-PD nurse) Heather Juarez [phone]- now on PD    GERD (gastroesophageal reflux disease)     History of blood transfusion     Hyperlipidemia     Hypertension      WBC = 7.5 on 11/2/23  Admitted from home    Assessment:   Patient is A&O x 4, communicative, incontinent with some assistance needed in repositioning. Bed: [place]  Patient wearing briefs for incontinence. Diet: None  Patient reports no pain. Right buttock, and sacral skin intact and without erythema. Palpable DP pulses bilaterally. 1. POA left buttock with very superficial wound approximately 0.2 cm x 0.2 cm x .01 wound bed is pink, wound edges re open, neha-wound intact. Z guard paste and sacral border applied. 2.  POA right buttock wound has resolved. 3.  POA bilateral heels with hyperpigmentation, seen by wound care almost 3 weeks ago and right heel appear much darker, appears to have been a sero/sang blister that is resolving. Will order Venelex ointment. Spoke with Dr. Marguerite Solitario, wound care orders obtained. Patient repositioned on left side. Heels offloaded on pillow. Recommendations:    Sacrum and bilateral heels- Every 12 hours liberally apply Venelex ointment. Offload heels. Left upper buttocks- Every 12 hours apply zinc oxide (Z guard/Protect- orange tube)    Skin Care & Pressure Prevention:  Minimize layers of linen/pads under patient to optimize support surface.

## 2023-11-02 NOTE — H&P
evaluation. We will continue with aspirin and Crestor. Inpatient Neurology consult will be requested to assist in further evaluation and treatment. We will check B97 and folic acid level. We will check C-reactive protein level to evaluate the patient for systemic inflammation. The patient recently had echocardiogram done which shows ejection fraction of 40-45%. This was done on 09/11/2023, because of that we will not repeat the echocardiogram unless the cardiologist want to repeat the echocardiogram.  2.  End-stage renal disease, on peritoneal dialysis:  The patient's Nephrology group has been consulted and the patient will continue with peritoneal dialysis as per Nephrology Service. 3.  Hyponatremia: This is mild, most likely due to congestive heart failure. We will continue to monitor the patient's sodium level closely. We will await further recommendation from the nephrologist.  4.  Hypokalemia. We will replete potassium and repeat the potassium level. We will also check magnesium level. 5.  Type 2 diabetes with hyperglycemia: We will place the patient on sliding scale with insulin coverage. We will also resume preadmission basal insulin. 6.  Elevated troponin level: The patient denies chest pain, and EKG did not suggest acute ischemia. We will trend troponin level. Cardiology consult will be requested to assist in further evaluation and treatment. 7.  COPD without exacerbation: We will continue DuoNeb as needed  8. Hypertension: We will resume preadmission medication and monitor the patient's blood pressure closely. 9.  Pressure ulcers:  Wound Care consult will be requested for local wound care. 10.  Congestive heart failure with reduced ejection fraction:  The patient appears to be well compensated. We will continue with Lasix. We will await further recommendation from the cardiologist.  11.  Tobacco abuse: The patient advised to quit smoking.   We will place the patient on Nicoderm

## 2023-11-02 NOTE — ED NOTES
TRANSFER - OUT REPORT:    Verbal report given to JANES Cohen on [employer]  being transferred to [facility] [id number] for routine progression of patient care       Report consisted of patient's Situation, Background, Assessment and   Recommendations(SBAR). Information from the following report(s) Nurse Handoff Report, Index, ED Encounter Summary, ED SBAR, Adult Overview, Intake/Output, MAR, Recent Results, Quality Measures, and Neuro Assessment was reviewed with the receiving nurse. [place] Fall Assessment:    Presents to emergency department  because of falls (Syncope, seizure, or loss of consciousness): No  Age > 70: No  Altered Mental Status, Intoxication with alcohol or substance confusion (Disorientation, impaired judgment, poor safety awaremess, or inability to follow instructions): Yes  Impaired Mobility: Ambulates or transfers with assistive devices or assistance; Unable to ambulate or transer.: No  Nursing Judgement: Yes          Lines:   Peripheral IV 11/01/23 Right Antecubital (Active)   Site Assessment Clean, dry & intact 11/01/23 1750   Line Status Blood return noted;Specimen collected;Normal saline locked 11/01/23 1750   Phlebitis Assessment No symptoms 11/01/23 1750   Infiltration Assessment 0 11/01/23 1750   Dressing Status Clean, dry & intact 11/01/23 1750   Dressing Type Transparent 11/01/23 1750        Opportunity for questions and clarification was provided.       Patient transported with:  Monitor and Registered Nurse           Jose Humphrey RN  11/01/23 2031

## 2023-11-02 NOTE — FLOWSHEET NOTE
11/02/23 1600   Treatment   Time On 1600   Observations & Evaluations   Level of Consciousness 0   Oriented X 4   Respiratory Quality/Effort Unlabored   O2 Device None (Room air)   Bilateral Breath Sounds Clear   Skin Color   (appropriate for ethnicity)   Skin Condition/Temp Warm;Dry   Abdomen Inspection Soft;Rounded   Bowel Sounds (All Quadrants) Active   Edema None   Vital Signs   BP (!) 154/86   Pulse 98   Respirations 12   Pain Assessment   Pain Assessment None - Denies Pain   Technical Checks   Dialysis Machine No. BS4   All Connections Secure Yes   ICEBOAT I;C;E;B;O;A;T        11/02/23 1600   Vitals   BP (!) 154/86   Pulse 98   Respirations 12   Peritoneal Dialysis Catheter Mid lower abdomen   No placement date or time found. Catheter Location: Mid lower abdomen   Status Accessed   Site Condition Clean, dry, intact   Dressing Status New dressing applied   Dressing Gauze   Date of Last Dressing Change 11/02/23   Dialysis Type Continuous cycling   Exit Site Condition good   Catheter Care Given Yes   Cycler   Verification of Prescription CCPD   Informed Consent    (chronic consent applies)   Total Volume Programmed 47698 mL   Therapy Time (Hours:Minutes) 9:30   Cycler Type Jung [facility]   Fill Volume 2300 mL   Last Fill Volume 1000 mL   Dextrose Setting Same (Nonextraneal)   Number of Cycles 5   Bag Volume 6000 mL   Number of Bags Used 3   Dianeal Solution   (3-2.5% Dextrose in 6000ml)     Patient Education: Procedural, plan of care    Pt orders,notes, labs and code status reviewed. Time out complete  CCPD initiated as per md order. *[place] AM disconnection, patient was drained for procedure, last fill will be 0.    Hepatitis B Surface Ag   Date/Time Value Ref Range Status   10/01/2023 01:39 AM <0.10 Index Final     Hep B S Ab   Date/Time Value Ref Range Status   10/05/2023 12:41 AM <3.10 mIU/mL Final

## 2023-11-03 ENCOUNTER — APPOINTMENT (OUTPATIENT)
Facility: HOSPITAL | Age: 68
DRG: 064 | End: 2023-11-03
Attending: PSYCHIATRY & NEUROLOGY
Payer: MEDICARE

## 2023-11-03 PROBLEM — R94.30 EJECTION FRACTION < 50%: Status: ACTIVE | Noted: 2023-11-03

## 2023-11-03 PROBLEM — E78.5 HYPERLIPIDEMIA: Status: ACTIVE | Noted: 2023-11-03

## 2023-11-03 PROBLEM — I63.10 CEREBROVASCULAR ACCIDENT (CVA) DUE TO EMBOLISM OF PRECEREBRAL ARTERY (HCC): Status: ACTIVE | Noted: 2023-11-03

## 2023-11-03 PROBLEM — F17.200 SMOKING: Status: ACTIVE | Noted: 2023-11-03

## 2023-11-03 PROBLEM — I10 PRIMARY HYPERTENSION: Status: ACTIVE | Noted: 2023-11-03

## 2023-11-03 LAB
ANION GAP SERPL CALC-SCNC: 13 MMOL/L (ref 5–15)
BUN SERPL-MCNC: 24 MG/DL (ref 6–20)
BUN/CREAT SERPL: 4 (ref 12–20)
CALCIUM SERPL-MCNC: 7.2 MG/DL (ref 8.5–10.1)
CHLORIDE SERPL-SCNC: 100 MMOL/L (ref 97–108)
CO2 SERPL-SCNC: 24 MMOL/L (ref 21–32)
CREAT SERPL-MCNC: 6.48 MG/DL (ref 0.55–1.02)
ECHO BSA: 1.9 M2
ECHO LV FRACTIONAL SHORTENING: 19 % (ref 28–44)
ECHO LV INTERNAL DIMENSION DIASTOLE INDEX: 1.99 CM/M2
ECHO LV INTERNAL DIMENSION DIASTOLIC: 3.7 CM (ref 3.9–5.3)
ECHO LV INTERNAL DIMENSION SYSTOLIC INDEX: 1.61 CM/M2
ECHO LV INTERNAL DIMENSION SYSTOLIC: 3 CM
GLUCOSE BLD STRIP.AUTO-MCNC: 119 MG/DL (ref 65–117)
GLUCOSE BLD STRIP.AUTO-MCNC: 153 MG/DL (ref 65–117)
GLUCOSE BLD STRIP.AUTO-MCNC: 157 MG/DL (ref 65–117)
GLUCOSE BLD STRIP.AUTO-MCNC: 159 MG/DL (ref 65–117)
GLUCOSE BLD STRIP.AUTO-MCNC: 176 MG/DL (ref 65–117)
GLUCOSE SERPL-MCNC: 156 MG/DL (ref 65–100)
POTASSIUM SERPL-SCNC: 2.5 MMOL/L (ref 3.5–5.1)
SERVICE CMNT-IMP: ABNORMAL
SODIUM SERPL-SCNC: 137 MMOL/L (ref 136–145)

## 2023-11-03 PROCEDURE — 97530 THERAPEUTIC ACTIVITIES: CPT

## 2023-11-03 PROCEDURE — 6360000002 HC RX W HCPCS: Performed by: HOSPITALIST

## 2023-11-03 PROCEDURE — 82962 GLUCOSE BLOOD TEST: CPT

## 2023-11-03 PROCEDURE — 6360000002 HC RX W HCPCS: Performed by: INTERNAL MEDICINE

## 2023-11-03 PROCEDURE — 2060000000 HC ICU INTERMEDIATE R&B

## 2023-11-03 PROCEDURE — 80048 BASIC METABOLIC PNL TOTAL CA: CPT

## 2023-11-03 PROCEDURE — 97110 THERAPEUTIC EXERCISES: CPT

## 2023-11-03 PROCEDURE — 97116 GAIT TRAINING THERAPY: CPT

## 2023-11-03 PROCEDURE — 90945 DIALYSIS ONE EVALUATION: CPT

## 2023-11-03 PROCEDURE — 6370000000 HC RX 637 (ALT 250 FOR IP): Performed by: INTERNAL MEDICINE

## 2023-11-03 PROCEDURE — 36415 COLL VENOUS BLD VENIPUNCTURE: CPT

## 2023-11-03 PROCEDURE — 6370000000 HC RX 637 (ALT 250 FOR IP): Performed by: HOSPITALIST

## 2023-11-03 PROCEDURE — 2580000003 HC RX 258: Performed by: INTERNAL MEDICINE

## 2023-11-03 PROCEDURE — 93308 TTE F-UP OR LMTD: CPT

## 2023-11-03 RX ORDER — MAGNESIUM SULFATE 1 G/100ML
1000 INJECTION INTRAVENOUS ONCE
Status: COMPLETED | OUTPATIENT
Start: 2023-11-03 | End: 2023-11-03

## 2023-11-03 RX ADMIN — Medication 6 MG: at 20:52

## 2023-11-03 RX ADMIN — DULOXETINE HYDROCHLORIDE 20 MG: 20 CAPSULE, DELAYED RELEASE ORAL at 08:15

## 2023-11-03 RX ADMIN — ASPIRIN 81 MG CHEWABLE TABLET 81 MG: 81 TABLET CHEWABLE at 08:14

## 2023-11-03 RX ADMIN — CARVEDILOL 6.25 MG: 6.25 TABLET, FILM COATED ORAL at 06:26

## 2023-11-03 RX ADMIN — SODIUM CHLORIDE, SODIUM LACTATE, CALCIUM CHLORIDE, MAGNESIUM CHLORIDE AND DEXTROSE 6000 ML: 2.5; 538; 448; 18.3; 5.08 INJECTION, SOLUTION INTRAPERITONEAL at 19:36

## 2023-11-03 RX ADMIN — ROSUVASTATIN 40 MG: 40 TABLET, FILM COATED ORAL at 08:14

## 2023-11-03 RX ADMIN — OXYCODONE HYDROCHLORIDE 5 MG: 5 TABLET ORAL at 20:54

## 2023-11-03 RX ADMIN — Medication: at 22:01

## 2023-11-03 RX ADMIN — Medication: at 12:12

## 2023-11-03 RX ADMIN — HEPARIN SODIUM 5000 UNITS: 5000 INJECTION INTRAVENOUS; SUBCUTANEOUS at 06:26

## 2023-11-03 RX ADMIN — PANTOPRAZOLE SODIUM 40 MG: 40 TABLET, DELAYED RELEASE ORAL at 06:26

## 2023-11-03 RX ADMIN — ONDANSETRON 4 MG: 4 TABLET, ORALLY DISINTEGRATING ORAL at 20:53

## 2023-11-03 RX ADMIN — AMIODARONE HYDROCHLORIDE 400 MG: 200 TABLET ORAL at 08:14

## 2023-11-03 RX ADMIN — MAGNESIUM SULFATE HEPTAHYDRATE 1000 MG: 1 INJECTION, SOLUTION INTRAVENOUS at 08:43

## 2023-11-03 RX ADMIN — METHOCARBAMOL TABLETS 500 MG: 500 TABLET, COATED ORAL at 16:00

## 2023-11-03 RX ADMIN — GENTAMICIN SULFATE: 1 CREAM TOPICAL at 19:37

## 2023-11-03 RX ADMIN — FUROSEMIDE 80 MG: 40 TABLET ORAL at 08:14

## 2023-11-03 RX ADMIN — POTASSIUM BICARBONATE 40 MEQ: 782 TABLET, EFFERVESCENT ORAL at 20:50

## 2023-11-03 RX ADMIN — HEPARIN SODIUM 5000 UNITS: 5000 INJECTION INTRAVENOUS; SUBCUTANEOUS at 16:00

## 2023-11-03 RX ADMIN — OXYCODONE HYDROCHLORIDE 5 MG: 5 TABLET ORAL at 12:25

## 2023-11-03 RX ADMIN — INSULIN GLARGINE 20 UNITS: 100 INJECTION, SOLUTION SUBCUTANEOUS at 20:57

## 2023-11-03 RX ADMIN — SODIUM CHLORIDE, PRESERVATIVE FREE 10 ML: 5 INJECTION INTRAVENOUS at 20:58

## 2023-11-03 RX ADMIN — METHOCARBAMOL TABLETS 500 MG: 500 TABLET, COATED ORAL at 08:14

## 2023-11-03 RX ADMIN — CARVEDILOL 6.25 MG: 6.25 TABLET, FILM COATED ORAL at 18:09

## 2023-11-03 RX ADMIN — Medication 1 MG: at 08:15

## 2023-11-03 RX ADMIN — SODIUM CHLORIDE, PRESERVATIVE FREE 10 ML: 5 INJECTION INTRAVENOUS at 08:29

## 2023-11-03 RX ADMIN — PANTOPRAZOLE SODIUM 40 MG: 40 TABLET, DELAYED RELEASE ORAL at 16:00

## 2023-11-03 RX ADMIN — MONTELUKAST 10 MG: 10 TABLET, FILM COATED ORAL at 20:54

## 2023-11-03 RX ADMIN — POTASSIUM BICARBONATE 40 MEQ: 782 TABLET, EFFERVESCENT ORAL at 16:00

## 2023-11-03 RX ADMIN — METHOCARBAMOL TABLETS 500 MG: 500 TABLET, COATED ORAL at 20:53

## 2023-11-03 RX ADMIN — POTASSIUM BICARBONATE 40 MEQ: 782 TABLET, EFFERVESCENT ORAL at 08:15

## 2023-11-03 RX ADMIN — HEPARIN SODIUM 5000 UNITS: 5000 INJECTION INTRAVENOUS; SUBCUTANEOUS at 21:55

## 2023-11-03 RX ADMIN — DULOXETINE HYDROCHLORIDE 20 MG: 20 CAPSULE, DELAYED RELEASE ORAL at 20:52

## 2023-11-03 ASSESSMENT — PAIN SCALES - GENERAL
PAINLEVEL_OUTOF10: 6
PAINLEVEL_OUTOF10: 7
PAINLEVEL_OUTOF10: 4
PAINLEVEL_OUTOF10: 7
PAINLEVEL_OUTOF10: 7

## 2023-11-03 ASSESSMENT — PAIN DESCRIPTION - LOCATION
LOCATION: BACK
LOCATION: FLANK;BREAST
LOCATION: BACK

## 2023-11-03 ASSESSMENT — PAIN DESCRIPTION - DESCRIPTORS: DESCRIPTORS: SHOOTING;NAGGING

## 2023-11-03 ASSESSMENT — PAIN DESCRIPTION - ORIENTATION: ORIENTATION: LEFT;RIGHT

## 2023-11-04 PROBLEM — I95.1 ORTHOSTATIC HYPOTENSION: Status: ACTIVE | Noted: 2023-11-04

## 2023-11-04 LAB
ANION GAP SERPL CALC-SCNC: 12 MMOL/L (ref 5–15)
BUN SERPL-MCNC: 34 MG/DL (ref 6–20)
BUN/CREAT SERPL: 5 (ref 12–20)
CALCIUM SERPL-MCNC: 7.4 MG/DL (ref 8.5–10.1)
CHLORIDE SERPL-SCNC: 98 MMOL/L (ref 97–108)
CO2 SERPL-SCNC: 25 MMOL/L (ref 21–32)
CREAT SERPL-MCNC: 6.9 MG/DL (ref 0.55–1.02)
GLUCOSE BLD STRIP.AUTO-MCNC: 137 MG/DL (ref 65–117)
GLUCOSE BLD STRIP.AUTO-MCNC: 63 MG/DL (ref 65–117)
GLUCOSE BLD STRIP.AUTO-MCNC: 64 MG/DL (ref 65–117)
GLUCOSE BLD STRIP.AUTO-MCNC: 67 MG/DL (ref 65–117)
GLUCOSE BLD STRIP.AUTO-MCNC: 67 MG/DL (ref 65–117)
GLUCOSE BLD STRIP.AUTO-MCNC: 81 MG/DL (ref 65–117)
GLUCOSE SERPL-MCNC: 60 MG/DL (ref 65–100)
POTASSIUM SERPL-SCNC: 3.9 MMOL/L (ref 3.5–5.1)
SERVICE CMNT-IMP: ABNORMAL
SERVICE CMNT-IMP: NORMAL
SODIUM SERPL-SCNC: 135 MMOL/L (ref 136–145)

## 2023-11-04 PROCEDURE — 90945 DIALYSIS ONE EVALUATION: CPT

## 2023-11-04 PROCEDURE — 6370000000 HC RX 637 (ALT 250 FOR IP): Performed by: INTERNAL MEDICINE

## 2023-11-04 PROCEDURE — 2580000003 HC RX 258: Performed by: INTERNAL MEDICINE

## 2023-11-04 PROCEDURE — 36415 COLL VENOUS BLD VENIPUNCTURE: CPT

## 2023-11-04 PROCEDURE — 82962 GLUCOSE BLOOD TEST: CPT

## 2023-11-04 PROCEDURE — 6360000002 HC RX W HCPCS: Performed by: INTERNAL MEDICINE

## 2023-11-04 PROCEDURE — 80048 BASIC METABOLIC PNL TOTAL CA: CPT

## 2023-11-04 PROCEDURE — 2060000000 HC ICU INTERMEDIATE R&B

## 2023-11-04 PROCEDURE — 99233 SBSQ HOSP IP/OBS HIGH 50: CPT | Performed by: PSYCHIATRY & NEUROLOGY

## 2023-11-04 RX ADMIN — INSULIN LISPRO 0 UNITS: 100 INJECTION, SOLUTION INTRAVENOUS; SUBCUTANEOUS at 12:28

## 2023-11-04 RX ADMIN — METHOCARBAMOL TABLETS 500 MG: 500 TABLET, COATED ORAL at 09:07

## 2023-11-04 RX ADMIN — AMIODARONE HYDROCHLORIDE 400 MG: 200 TABLET ORAL at 09:07

## 2023-11-04 RX ADMIN — CARVEDILOL 6.25 MG: 6.25 TABLET, FILM COATED ORAL at 17:57

## 2023-11-04 RX ADMIN — SODIUM CHLORIDE, SODIUM LACTATE, CALCIUM CHLORIDE, MAGNESIUM CHLORIDE AND DEXTROSE 6000 ML: 2.5; 538; 448; 18.3; 5.08 INJECTION, SOLUTION INTRAPERITONEAL at 19:40

## 2023-11-04 RX ADMIN — ASPIRIN 81 MG CHEWABLE TABLET 81 MG: 81 TABLET CHEWABLE at 09:07

## 2023-11-04 RX ADMIN — DULOXETINE HYDROCHLORIDE 20 MG: 20 CAPSULE, DELAYED RELEASE ORAL at 22:26

## 2023-11-04 RX ADMIN — Medication: at 09:30

## 2023-11-04 RX ADMIN — POTASSIUM BICARBONATE 40 MEQ: 782 TABLET, EFFERVESCENT ORAL at 09:07

## 2023-11-04 RX ADMIN — SODIUM CHLORIDE, PRESERVATIVE FREE 10 ML: 5 INJECTION INTRAVENOUS at 22:26

## 2023-11-04 RX ADMIN — POTASSIUM BICARBONATE 40 MEQ: 782 TABLET, EFFERVESCENT ORAL at 22:26

## 2023-11-04 RX ADMIN — ROSUVASTATIN 40 MG: 40 TABLET, FILM COATED ORAL at 09:07

## 2023-11-04 RX ADMIN — PANTOPRAZOLE SODIUM 40 MG: 40 TABLET, DELAYED RELEASE ORAL at 06:16

## 2023-11-04 RX ADMIN — SODIUM CHLORIDE, PRESERVATIVE FREE 10 ML: 5 INJECTION INTRAVENOUS at 09:08

## 2023-11-04 RX ADMIN — POTASSIUM BICARBONATE 40 MEQ: 782 TABLET, EFFERVESCENT ORAL at 13:25

## 2023-11-04 RX ADMIN — FUROSEMIDE 80 MG: 40 TABLET ORAL at 09:07

## 2023-11-04 RX ADMIN — Medication: at 22:29

## 2023-11-04 RX ADMIN — METHOCARBAMOL TABLETS 500 MG: 500 TABLET, COATED ORAL at 13:24

## 2023-11-04 RX ADMIN — OXYCODONE HYDROCHLORIDE 5 MG: 5 TABLET ORAL at 17:57

## 2023-11-04 RX ADMIN — Medication 1 MG: at 09:08

## 2023-11-04 RX ADMIN — DULOXETINE HYDROCHLORIDE 20 MG: 20 CAPSULE, DELAYED RELEASE ORAL at 09:07

## 2023-11-04 RX ADMIN — PANTOPRAZOLE SODIUM 40 MG: 40 TABLET, DELAYED RELEASE ORAL at 17:57

## 2023-11-04 RX ADMIN — OXYCODONE HYDROCHLORIDE 5 MG: 5 TABLET ORAL at 09:07

## 2023-11-04 RX ADMIN — METHOCARBAMOL TABLETS 500 MG: 500 TABLET, COATED ORAL at 22:26

## 2023-11-04 RX ADMIN — CARVEDILOL 6.25 MG: 6.25 TABLET, FILM COATED ORAL at 09:30

## 2023-11-04 RX ADMIN — MONTELUKAST 10 MG: 10 TABLET, FILM COATED ORAL at 22:26

## 2023-11-04 RX ADMIN — HEPARIN SODIUM 5000 UNITS: 5000 INJECTION INTRAVENOUS; SUBCUTANEOUS at 13:30

## 2023-11-04 RX ADMIN — HEPARIN SODIUM 5000 UNITS: 5000 INJECTION INTRAVENOUS; SUBCUTANEOUS at 06:16

## 2023-11-04 RX ADMIN — INSULIN LISPRO 0 UNITS: 100 INJECTION, SOLUTION INTRAVENOUS; SUBCUTANEOUS at 09:08

## 2023-11-04 RX ADMIN — HEPARIN SODIUM 5000 UNITS: 5000 INJECTION INTRAVENOUS; SUBCUTANEOUS at 22:26

## 2023-11-04 ASSESSMENT — PAIN SCALES - GENERAL
PAINLEVEL_OUTOF10: 6
PAINLEVEL_OUTOF10: 0
PAINLEVEL_OUTOF10: 2
PAINLEVEL_OUTOF10: 7

## 2023-11-04 ASSESSMENT — ENCOUNTER SYMPTOMS
GASTROINTESTINAL NEGATIVE: 1
RESPIRATORY NEGATIVE: 1

## 2023-11-04 ASSESSMENT — PAIN DESCRIPTION - DIRECTION: RADIATING_TOWARDS: LEGS

## 2023-11-04 ASSESSMENT — PAIN DESCRIPTION - ONSET: ONSET: ON-GOING

## 2023-11-04 ASSESSMENT — PAIN DESCRIPTION - ORIENTATION: ORIENTATION: LEFT

## 2023-11-04 ASSESSMENT — PAIN DESCRIPTION - DESCRIPTORS: DESCRIPTORS: ACHING

## 2023-11-04 ASSESSMENT — PAIN DESCRIPTION - FREQUENCY: FREQUENCY: CONTINUOUS

## 2023-11-04 ASSESSMENT — PAIN - FUNCTIONAL ASSESSMENT: PAIN_FUNCTIONAL_ASSESSMENT: ACTIVITIES ARE NOT PREVENTED

## 2023-11-04 ASSESSMENT — PAIN DESCRIPTION - PAIN TYPE: TYPE: CHRONIC PAIN

## 2023-11-04 ASSESSMENT — PAIN DESCRIPTION - LOCATION: LOCATION: BACK

## 2023-11-05 LAB
ALBUMIN SERPL-MCNC: 1.8 G/DL (ref 3.5–5)
ALBUMIN/GLOB SERPL: 0.4 (ref 1.1–2.2)
ALP SERPL-CCNC: 168 U/L (ref 45–117)
ALT SERPL-CCNC: 15 U/L (ref 12–78)
ANION GAP SERPL CALC-SCNC: 9 MMOL/L (ref 5–15)
AST SERPL-CCNC: 21 U/L (ref 15–37)
BASOPHILS # BLD: 0.1 K/UL (ref 0–0.1)
BASOPHILS NFR BLD: 1 % (ref 0–1)
BILIRUB SERPL-MCNC: 0.6 MG/DL (ref 0.2–1)
BUN SERPL-MCNC: 34 MG/DL (ref 6–20)
BUN/CREAT SERPL: 5 (ref 12–20)
CALCIUM SERPL-MCNC: 8.3 MG/DL (ref 8.5–10.1)
CHLORIDE SERPL-SCNC: 97 MMOL/L (ref 97–108)
CO2 SERPL-SCNC: 27 MMOL/L (ref 21–32)
CREAT SERPL-MCNC: 6.63 MG/DL (ref 0.55–1.02)
DIFFERENTIAL METHOD BLD: ABNORMAL
EKG ATRIAL RATE: 107 BPM
EKG DIAGNOSIS: NORMAL
EKG P AXIS: 55 DEGREES
EKG P-R INTERVAL: 146 MS
EKG Q-T INTERVAL: 316 MS
EKG QRS DURATION: 98 MS
EKG QTC CALCULATION (BAZETT): 421 MS
EKG R AXIS: -45 DEGREES
EKG T AXIS: 109 DEGREES
EKG VENTRICULAR RATE: 107 BPM
EOSINOPHIL # BLD: 0.2 K/UL (ref 0–0.4)
EOSINOPHIL NFR BLD: 3 % (ref 0–7)
ERYTHROCYTE [DISTWIDTH] IN BLOOD BY AUTOMATED COUNT: 18.6 % (ref 11.5–14.5)
GLOBULIN SER CALC-MCNC: 4.4 G/DL (ref 2–4)
GLUCOSE BLD STRIP.AUTO-MCNC: 133 MG/DL (ref 65–117)
GLUCOSE BLD STRIP.AUTO-MCNC: 142 MG/DL (ref 65–117)
GLUCOSE BLD STRIP.AUTO-MCNC: 158 MG/DL (ref 65–117)
GLUCOSE BLD STRIP.AUTO-MCNC: 209 MG/DL (ref 65–117)
GLUCOSE SERPL-MCNC: 167 MG/DL (ref 65–100)
HCT VFR BLD AUTO: 29.9 % (ref 35–47)
HGB BLD-MCNC: 9.3 G/DL (ref 11.5–16)
IMM GRANULOCYTES # BLD AUTO: 0 K/UL (ref 0–0.04)
IMM GRANULOCYTES NFR BLD AUTO: 1 % (ref 0–0.5)
LYMPHOCYTES # BLD: 1.8 K/UL (ref 0.8–3.5)
LYMPHOCYTES NFR BLD: 23 % (ref 12–49)
MCH RBC QN AUTO: 28.7 PG (ref 26–34)
MCHC RBC AUTO-ENTMCNC: 31.1 G/DL (ref 30–36.5)
MCV RBC AUTO: 92.3 FL (ref 80–99)
MONOCYTES # BLD: 0.5 K/UL (ref 0–1)
MONOCYTES NFR BLD: 7 % (ref 5–13)
NEUTS SEG # BLD: 5.2 K/UL (ref 1.8–8)
NEUTS SEG NFR BLD: 65 % (ref 32–75)
NRBC # BLD: 0 K/UL (ref 0–0.01)
NRBC BLD-RTO: 0 PER 100 WBC
PHOSPHATE SERPL-MCNC: 4 MG/DL (ref 2.6–4.7)
PLATELET # BLD AUTO: 287 K/UL (ref 150–400)
PMV BLD AUTO: 9.3 FL (ref 8.9–12.9)
POTASSIUM SERPL-SCNC: 4.8 MMOL/L (ref 3.5–5.1)
PROT SERPL-MCNC: 6.2 G/DL (ref 6.4–8.2)
RBC # BLD AUTO: 3.24 M/UL (ref 3.8–5.2)
SERVICE CMNT-IMP: ABNORMAL
SODIUM SERPL-SCNC: 133 MMOL/L (ref 136–145)
WBC # BLD AUTO: 7.9 K/UL (ref 3.6–11)

## 2023-11-05 PROCEDURE — 80053 COMPREHEN METABOLIC PANEL: CPT

## 2023-11-05 PROCEDURE — 84100 ASSAY OF PHOSPHORUS: CPT

## 2023-11-05 PROCEDURE — 2580000003 HC RX 258: Performed by: INTERNAL MEDICINE

## 2023-11-05 PROCEDURE — 2060000000 HC ICU INTERMEDIATE R&B

## 2023-11-05 PROCEDURE — 97530 THERAPEUTIC ACTIVITIES: CPT

## 2023-11-05 PROCEDURE — 36415 COLL VENOUS BLD VENIPUNCTURE: CPT

## 2023-11-05 PROCEDURE — 82962 GLUCOSE BLOOD TEST: CPT

## 2023-11-05 PROCEDURE — 90945 DIALYSIS ONE EVALUATION: CPT

## 2023-11-05 PROCEDURE — 85025 COMPLETE CBC W/AUTO DIFF WBC: CPT

## 2023-11-05 PROCEDURE — 6360000002 HC RX W HCPCS: Performed by: INTERNAL MEDICINE

## 2023-11-05 PROCEDURE — 6370000000 HC RX 637 (ALT 250 FOR IP): Performed by: INTERNAL MEDICINE

## 2023-11-05 RX ORDER — MIDODRINE HYDROCHLORIDE 5 MG/1
5 TABLET ORAL EVERY 8 HOURS PRN
Status: DISCONTINUED | OUTPATIENT
Start: 2023-11-05 | End: 2023-11-07 | Stop reason: HOSPADM

## 2023-11-05 RX ADMIN — OXYCODONE HYDROCHLORIDE 5 MG: 5 TABLET ORAL at 02:43

## 2023-11-05 RX ADMIN — SODIUM CHLORIDE, SODIUM LACTATE, CALCIUM CHLORIDE, MAGNESIUM CHLORIDE AND DEXTROSE 6000 ML: 2.5; 538; 448; 18.3; 5.08 INJECTION, SOLUTION INTRAPERITONEAL at 19:31

## 2023-11-05 RX ADMIN — CARVEDILOL 6.25 MG: 6.25 TABLET, FILM COATED ORAL at 09:00

## 2023-11-05 RX ADMIN — CARVEDILOL 6.25 MG: 6.25 TABLET, FILM COATED ORAL at 17:00

## 2023-11-05 RX ADMIN — FUROSEMIDE 80 MG: 40 TABLET ORAL at 09:00

## 2023-11-05 RX ADMIN — METHOCARBAMOL TABLETS 500 MG: 500 TABLET, COATED ORAL at 22:00

## 2023-11-05 RX ADMIN — INSULIN GLARGINE 20 UNITS: 100 INJECTION, SOLUTION SUBCUTANEOUS at 21:59

## 2023-11-05 RX ADMIN — METHOCARBAMOL TABLETS 500 MG: 500 TABLET, COATED ORAL at 15:00

## 2023-11-05 RX ADMIN — DULOXETINE HYDROCHLORIDE 20 MG: 20 CAPSULE, DELAYED RELEASE ORAL at 21:59

## 2023-11-05 RX ADMIN — ASPIRIN 81 MG CHEWABLE TABLET 81 MG: 81 TABLET CHEWABLE at 09:00

## 2023-11-05 RX ADMIN — DULOXETINE HYDROCHLORIDE 20 MG: 20 CAPSULE, DELAYED RELEASE ORAL at 09:00

## 2023-11-05 RX ADMIN — GENTAMICIN SULFATE: 1 CREAM TOPICAL at 19:31

## 2023-11-05 RX ADMIN — SODIUM CHLORIDE, PRESERVATIVE FREE 10 ML: 5 INJECTION INTRAVENOUS at 22:00

## 2023-11-05 RX ADMIN — HEPARIN SODIUM 5000 UNITS: 5000 INJECTION INTRAVENOUS; SUBCUTANEOUS at 21:59

## 2023-11-05 RX ADMIN — HEPARIN SODIUM 5000 UNITS: 5000 INJECTION INTRAVENOUS; SUBCUTANEOUS at 13:46

## 2023-11-05 RX ADMIN — PANTOPRAZOLE SODIUM 40 MG: 40 TABLET, DELAYED RELEASE ORAL at 06:59

## 2023-11-05 RX ADMIN — SODIUM CHLORIDE, PRESERVATIVE FREE 10 ML: 5 INJECTION INTRAVENOUS at 09:00

## 2023-11-05 RX ADMIN — MONTELUKAST 10 MG: 10 TABLET, FILM COATED ORAL at 21:59

## 2023-11-05 RX ADMIN — ROSUVASTATIN 40 MG: 40 TABLET, FILM COATED ORAL at 08:00

## 2023-11-05 RX ADMIN — Medication: at 11:00

## 2023-11-05 RX ADMIN — METHOCARBAMOL TABLETS 500 MG: 500 TABLET, COATED ORAL at 09:00

## 2023-11-05 RX ADMIN — INSULIN LISPRO 0 UNITS: 100 INJECTION, SOLUTION INTRAVENOUS; SUBCUTANEOUS at 17:00

## 2023-11-05 RX ADMIN — OXYCODONE HYDROCHLORIDE 5 MG: 5 TABLET ORAL at 13:46

## 2023-11-05 RX ADMIN — AMIODARONE HYDROCHLORIDE 400 MG: 200 TABLET ORAL at 09:00

## 2023-11-05 RX ADMIN — Medication 1 MG: at 08:00

## 2023-11-05 RX ADMIN — PANTOPRAZOLE SODIUM 40 MG: 40 TABLET, DELAYED RELEASE ORAL at 16:19

## 2023-11-05 RX ADMIN — Medication: at 22:00

## 2023-11-05 RX ADMIN — HEPARIN SODIUM 5000 UNITS: 5000 INJECTION INTRAVENOUS; SUBCUTANEOUS at 06:59

## 2023-11-05 ASSESSMENT — PAIN DESCRIPTION - ONSET: ONSET: ON-GOING

## 2023-11-05 ASSESSMENT — PAIN DESCRIPTION - DIRECTION: RADIATING_TOWARDS: LEG

## 2023-11-05 ASSESSMENT — PAIN DESCRIPTION - FREQUENCY: FREQUENCY: CONTINUOUS

## 2023-11-05 ASSESSMENT — PAIN SCALES - GENERAL
PAINLEVEL_OUTOF10: 7
PAINLEVEL_OUTOF10: 6
PAINLEVEL_OUTOF10: 0

## 2023-11-05 ASSESSMENT — PAIN DESCRIPTION - ORIENTATION: ORIENTATION: LEFT

## 2023-11-05 ASSESSMENT — PAIN DESCRIPTION - DESCRIPTORS: DESCRIPTORS: ACHING

## 2023-11-05 ASSESSMENT — PAIN DESCRIPTION - PAIN TYPE: TYPE: CHRONIC PAIN

## 2023-11-05 ASSESSMENT — PAIN DESCRIPTION - LOCATION: LOCATION: BACK

## 2023-11-05 ASSESSMENT — PAIN - FUNCTIONAL ASSESSMENT: PAIN_FUNCTIONAL_ASSESSMENT: ACTIVITIES ARE NOT PREVENTED

## 2023-11-05 NOTE — FLOWSHEET NOTE
CCPD Disconnect: 11/05/23 0840   Vitals   BP (!) 118/58   Pulse 98   Respirations 16   Peritoneal Dialysis Catheter Mid lower abdomen   No placement date or time found. Catheter Location: Mid lower abdomen   Status Deaccessed   Dressing Status Clean, dry & intact   Dressing Gauze   Date of Last Dressing Change 11/04/23   Dialysis Type Continuous cycling   Catheter Care Given Yes  (2 minute alcavis scrub/soak)   Post-Treatment (Cycler)   Average Dwell Time (Hours:Minutes) 1:16   Lost Dwell Time (Hours:Minutes) 0:37   Effluent Appearance Clear;Yellow   I Drain (mL) 1 mL   PD Output (mL) 69 mL  (idrain 1 + Total UF 68)   Primary RN SBAR: Patricia Hutchison, JANES  Comments: Treatment completed as ordered, no alarms.

## 2023-11-06 ENCOUNTER — HOME CARE VISIT (OUTPATIENT)
Dept: HOME HEALTH SERVICES | Facility: HOME HEALTH | Age: 68
End: 2023-11-06
Payer: MEDICARE

## 2023-11-06 LAB
ANION GAP SERPL CALC-SCNC: 10 MMOL/L (ref 5–15)
BACTERIA SPEC CULT: NORMAL
BASOPHILS # BLD: 0.1 K/UL (ref 0–0.1)
BASOPHILS NFR BLD: 1 % (ref 0–1)
BUN SERPL-MCNC: 34 MG/DL (ref 6–20)
BUN/CREAT SERPL: 5 (ref 12–20)
CALCIUM SERPL-MCNC: 7.5 MG/DL (ref 8.5–10.1)
CHLORIDE SERPL-SCNC: 98 MMOL/L (ref 97–108)
CO2 SERPL-SCNC: 26 MMOL/L (ref 21–32)
CREAT SERPL-MCNC: 6.65 MG/DL (ref 0.55–1.02)
DIFFERENTIAL METHOD BLD: ABNORMAL
EOSINOPHIL # BLD: 0.2 K/UL (ref 0–0.4)
EOSINOPHIL NFR BLD: 3 % (ref 0–7)
ERYTHROCYTE [DISTWIDTH] IN BLOOD BY AUTOMATED COUNT: 18.6 % (ref 11.5–14.5)
GLUCOSE BLD STRIP.AUTO-MCNC: 116 MG/DL (ref 65–117)
GLUCOSE BLD STRIP.AUTO-MCNC: 147 MG/DL (ref 65–117)
GLUCOSE BLD STRIP.AUTO-MCNC: 151 MG/DL (ref 65–117)
GLUCOSE BLD STRIP.AUTO-MCNC: 81 MG/DL (ref 65–117)
GLUCOSE SERPL-MCNC: 186 MG/DL (ref 65–100)
GRAM STN SPEC: NORMAL
GRAM STN SPEC: NORMAL
HCT VFR BLD AUTO: 28.8 % (ref 35–47)
HGB BLD-MCNC: 8.7 G/DL (ref 11.5–16)
IMM GRANULOCYTES # BLD AUTO: 0 K/UL (ref 0–0.04)
IMM GRANULOCYTES NFR BLD AUTO: 0 % (ref 0–0.5)
LYMPHOCYTES # BLD: 2.4 K/UL (ref 0.8–3.5)
LYMPHOCYTES NFR BLD: 30 % (ref 12–49)
MCH RBC QN AUTO: 28.4 PG (ref 26–34)
MCHC RBC AUTO-ENTMCNC: 30.2 G/DL (ref 30–36.5)
MCV RBC AUTO: 94.1 FL (ref 80–99)
MONOCYTES # BLD: 0.6 K/UL (ref 0–1)
MONOCYTES NFR BLD: 7 % (ref 5–13)
NEUTS SEG # BLD: 4.7 K/UL (ref 1.8–8)
NEUTS SEG NFR BLD: 59 % (ref 32–75)
NRBC # BLD: 0 K/UL (ref 0–0.01)
NRBC BLD-RTO: 0 PER 100 WBC
PLATELET # BLD AUTO: 272 K/UL (ref 150–400)
PMV BLD AUTO: 9.1 FL (ref 8.9–12.9)
POTASSIUM SERPL-SCNC: 4.5 MMOL/L (ref 3.5–5.1)
RBC # BLD AUTO: 3.06 M/UL (ref 3.8–5.2)
SERVICE CMNT-IMP: ABNORMAL
SERVICE CMNT-IMP: ABNORMAL
SERVICE CMNT-IMP: NORMAL
SODIUM SERPL-SCNC: 134 MMOL/L (ref 136–145)
WBC # BLD AUTO: 8 K/UL (ref 3.6–11)

## 2023-11-06 PROCEDURE — 97530 THERAPEUTIC ACTIVITIES: CPT

## 2023-11-06 PROCEDURE — 2580000003 HC RX 258: Performed by: INTERNAL MEDICINE

## 2023-11-06 PROCEDURE — 90945 DIALYSIS ONE EVALUATION: CPT

## 2023-11-06 PROCEDURE — 6360000002 HC RX W HCPCS: Performed by: INTERNAL MEDICINE

## 2023-11-06 PROCEDURE — 85025 COMPLETE CBC W/AUTO DIFF WBC: CPT

## 2023-11-06 PROCEDURE — 80048 BASIC METABOLIC PNL TOTAL CA: CPT

## 2023-11-06 PROCEDURE — 2060000000 HC ICU INTERMEDIATE R&B

## 2023-11-06 PROCEDURE — 6370000000 HC RX 637 (ALT 250 FOR IP): Performed by: HOSPITALIST

## 2023-11-06 PROCEDURE — 6370000000 HC RX 637 (ALT 250 FOR IP)

## 2023-11-06 PROCEDURE — 82962 GLUCOSE BLOOD TEST: CPT

## 2023-11-06 PROCEDURE — 36415 COLL VENOUS BLD VENIPUNCTURE: CPT

## 2023-11-06 PROCEDURE — 6370000000 HC RX 637 (ALT 250 FOR IP): Performed by: INTERNAL MEDICINE

## 2023-11-06 RX ADMIN — ROSUVASTATIN 40 MG: 40 TABLET, FILM COATED ORAL at 09:28

## 2023-11-06 RX ADMIN — CARVEDILOL 6.25 MG: 6.25 TABLET, FILM COATED ORAL at 09:11

## 2023-11-06 RX ADMIN — SODIUM CHLORIDE, SODIUM LACTATE, CALCIUM CHLORIDE, MAGNESIUM CHLORIDE AND DEXTROSE 6000 ML: 2.5; 538; 448; 18.3; 5.08 INJECTION, SOLUTION INTRAPERITONEAL at 19:32

## 2023-11-06 RX ADMIN — ASPIRIN 81 MG CHEWABLE TABLET 81 MG: 81 TABLET CHEWABLE at 09:11

## 2023-11-06 RX ADMIN — SODIUM CHLORIDE, PRESERVATIVE FREE 10 ML: 5 INJECTION INTRAVENOUS at 21:28

## 2023-11-06 RX ADMIN — HEPARIN SODIUM 5000 UNITS: 5000 INJECTION INTRAVENOUS; SUBCUTANEOUS at 21:28

## 2023-11-06 RX ADMIN — Medication: at 22:49

## 2023-11-06 RX ADMIN — METHOCARBAMOL TABLETS 500 MG: 500 TABLET, COATED ORAL at 15:02

## 2023-11-06 RX ADMIN — PANTOPRAZOLE SODIUM 40 MG: 40 TABLET, DELAYED RELEASE ORAL at 06:17

## 2023-11-06 RX ADMIN — ONDANSETRON 4 MG: 2 INJECTION INTRAMUSCULAR; INTRAVENOUS at 12:29

## 2023-11-06 RX ADMIN — PANTOPRAZOLE SODIUM 40 MG: 40 TABLET, DELAYED RELEASE ORAL at 17:41

## 2023-11-06 RX ADMIN — HEPARIN SODIUM 5000 UNITS: 5000 INJECTION INTRAVENOUS; SUBCUTANEOUS at 15:02

## 2023-11-06 RX ADMIN — METHOCARBAMOL TABLETS 500 MG: 500 TABLET, COATED ORAL at 21:27

## 2023-11-06 RX ADMIN — MONTELUKAST 10 MG: 10 TABLET, FILM COATED ORAL at 21:27

## 2023-11-06 RX ADMIN — SODIUM CHLORIDE, PRESERVATIVE FREE 10 ML: 5 INJECTION INTRAVENOUS at 09:20

## 2023-11-06 RX ADMIN — Medication: at 22:48

## 2023-11-06 RX ADMIN — DULOXETINE HYDROCHLORIDE 20 MG: 20 CAPSULE, DELAYED RELEASE ORAL at 21:27

## 2023-11-06 RX ADMIN — INSULIN GLARGINE 20 UNITS: 100 INJECTION, SOLUTION SUBCUTANEOUS at 21:30

## 2023-11-06 RX ADMIN — FUROSEMIDE 80 MG: 40 TABLET ORAL at 09:11

## 2023-11-06 RX ADMIN — Medication 1 MG: at 09:11

## 2023-11-06 RX ADMIN — OXYCODONE HYDROCHLORIDE 5 MG: 5 TABLET ORAL at 06:17

## 2023-11-06 RX ADMIN — GENTAMICIN SULFATE: 1 CREAM TOPICAL at 19:32

## 2023-11-06 RX ADMIN — AMIODARONE HYDROCHLORIDE 400 MG: 200 TABLET ORAL at 09:11

## 2023-11-06 RX ADMIN — HEPARIN SODIUM 5000 UNITS: 5000 INJECTION INTRAVENOUS; SUBCUTANEOUS at 06:17

## 2023-11-06 RX ADMIN — METHOCARBAMOL TABLETS 500 MG: 500 TABLET, COATED ORAL at 09:11

## 2023-11-06 RX ADMIN — POTASSIUM BICARBONATE 40 MEQ: 782 TABLET, EFFERVESCENT ORAL at 09:19

## 2023-11-06 RX ADMIN — Medication: at 12:12

## 2023-11-06 RX ADMIN — OXYCODONE HYDROCHLORIDE 5 MG: 5 TABLET ORAL at 22:51

## 2023-11-06 RX ADMIN — CARVEDILOL 6.25 MG: 6.25 TABLET, FILM COATED ORAL at 17:41

## 2023-11-06 RX ADMIN — DULOXETINE HYDROCHLORIDE 20 MG: 20 CAPSULE, DELAYED RELEASE ORAL at 09:11

## 2023-11-06 ASSESSMENT — PAIN DESCRIPTION - LOCATION: LOCATION: ABDOMEN

## 2023-11-06 ASSESSMENT — PAIN SCALES - GENERAL
PAINLEVEL_OUTOF10: 8
PAINLEVEL_OUTOF10: 0
PAINLEVEL_OUTOF10: 8
PAINLEVEL_OUTOF10: 0

## 2023-11-07 VITALS
RESPIRATION RATE: 15 BRPM | HEIGHT: 64 IN | WEIGHT: 164.9 LBS | DIASTOLIC BLOOD PRESSURE: 80 MMHG | HEART RATE: 87 BPM | TEMPERATURE: 97.4 F | OXYGEN SATURATION: 97 % | BODY MASS INDEX: 28.15 KG/M2 | SYSTOLIC BLOOD PRESSURE: 134 MMHG

## 2023-11-07 LAB
GLUCOSE BLD STRIP.AUTO-MCNC: 104 MG/DL (ref 65–117)
GLUCOSE BLD STRIP.AUTO-MCNC: 138 MG/DL (ref 65–117)
SERVICE CMNT-IMP: ABNORMAL
SERVICE CMNT-IMP: NORMAL

## 2023-11-07 PROCEDURE — 6370000000 HC RX 637 (ALT 250 FOR IP): Performed by: INTERNAL MEDICINE

## 2023-11-07 PROCEDURE — 82962 GLUCOSE BLOOD TEST: CPT

## 2023-11-07 PROCEDURE — 2580000003 HC RX 258: Performed by: INTERNAL MEDICINE

## 2023-11-07 PROCEDURE — 6360000002 HC RX W HCPCS: Performed by: INTERNAL MEDICINE

## 2023-11-07 PROCEDURE — 6370000000 HC RX 637 (ALT 250 FOR IP): Performed by: HOSPITALIST

## 2023-11-07 RX ORDER — AMIODARONE HYDROCHLORIDE 400 MG/1
400 TABLET ORAL DAILY
Qty: 14 TABLET | Refills: 0 | Status: SHIPPED | OUTPATIENT
Start: 2023-11-08 | End: 2023-11-22

## 2023-11-07 RX ORDER — CASTOR OIL AND BALSAM, PERU 788; 87 MG/G; MG/G
OINTMENT TOPICAL 2 TIMES DAILY
Qty: 5 G | Refills: 0 | Status: SHIPPED | OUTPATIENT
Start: 2023-11-07 | End: 2023-11-21

## 2023-11-07 RX ORDER — AMIODARONE HYDROCHLORIDE 100 MG/1
200 TABLET ORAL DAILY
Qty: 60 TABLET | Refills: 0 | Status: SHIPPED | OUTPATIENT
Start: 2023-11-07 | End: 2023-12-07

## 2023-11-07 RX ADMIN — DULOXETINE HYDROCHLORIDE 20 MG: 20 CAPSULE, DELAYED RELEASE ORAL at 08:31

## 2023-11-07 RX ADMIN — ROSUVASTATIN 40 MG: 40 TABLET, FILM COATED ORAL at 08:31

## 2023-11-07 RX ADMIN — METHOCARBAMOL TABLETS 500 MG: 500 TABLET, COATED ORAL at 08:33

## 2023-11-07 RX ADMIN — AMIODARONE HYDROCHLORIDE 400 MG: 200 TABLET ORAL at 08:25

## 2023-11-07 RX ADMIN — ASPIRIN 81 MG CHEWABLE TABLET 81 MG: 81 TABLET CHEWABLE at 08:31

## 2023-11-07 RX ADMIN — POTASSIUM BICARBONATE 40 MEQ: 782 TABLET, EFFERVESCENT ORAL at 08:36

## 2023-11-07 RX ADMIN — OXYCODONE HYDROCHLORIDE 5 MG: 5 TABLET ORAL at 06:29

## 2023-11-07 RX ADMIN — Medication 1 MG: at 08:30

## 2023-11-07 RX ADMIN — PANTOPRAZOLE SODIUM 40 MG: 40 TABLET, DELAYED RELEASE ORAL at 06:26

## 2023-11-07 RX ADMIN — HEPARIN SODIUM 5000 UNITS: 5000 INJECTION INTRAVENOUS; SUBCUTANEOUS at 06:26

## 2023-11-07 RX ADMIN — FUROSEMIDE 80 MG: 40 TABLET ORAL at 08:29

## 2023-11-07 RX ADMIN — Medication: at 11:39

## 2023-11-07 RX ADMIN — SODIUM CHLORIDE, PRESERVATIVE FREE 10 ML: 5 INJECTION INTRAVENOUS at 08:38

## 2023-11-07 RX ADMIN — CARVEDILOL 6.25 MG: 6.25 TABLET, FILM COATED ORAL at 08:27

## 2023-11-07 ASSESSMENT — PAIN SCALES - GENERAL
PAINLEVEL_OUTOF10: 8
PAINLEVEL_OUTOF10: 6
PAINLEVEL_OUTOF10: 0
PAINLEVEL_OUTOF10: 5

## 2023-11-07 ASSESSMENT — PAIN DESCRIPTION - ORIENTATION
ORIENTATION: RIGHT;LEFT
ORIENTATION: RIGHT;LEFT

## 2023-11-07 ASSESSMENT — PAIN DESCRIPTION - LOCATION: LOCATION: ABDOMEN

## 2023-11-07 NOTE — DISCHARGE SUMMARY
Phone: [phone]   amiodarone 100 MG tablet  amiodarone 400 MG tablet  balsum peru-castor oil Oint ointment           NOTIFY YOUR PHYSICIAN FOR ANY OF THE FOLLOWING:   Fever over 101 degrees for 24 hours. Chest pain, shortness of breath, fever, chills, nausea, vomiting, diarrhea, change in mentation, falling, weakness, bleeding. Severe pain or pain not relieved by medications. Or, any other signs or symptoms that you may have questions about.     DISPOSITION:    Home With:   OT  PT  HH  RN      x Long term SNF/Inpatient Rehab    Independent/assisted living    Hospice    Other:       PATIENT CONDITION AT DISCHARGE:     Functional status    Poor    x Deconditioned     Independent      Cognition    x Lucid     Forgetful     Dementia      Catheters/lines (plus indication)    [place]     PICC     PEG    x None      Code status    x Full code     DNR      PHYSICAL EXAMINATION AT DISCHARGE:    General : alert x 3, awake, no acute distress,   HEENT: PEERL, EOMI, moist mucus membrane, TM clear  Neck: supple, no JVD, no meningeal signs  Chest: Clear to auscultation bilaterally   CVS: S1 S2 heard, Capillary refill less than 2 seconds  Abd: soft/ Non tender, non distended, BS physiological,   Ext: no clubbing, no cyanosis, no edema, brisk 2+ DP pulses  Neuro/Psych: pleasant mood and affect, CN 2-12 grossly intact, sensory grossly within normal limit, Strength 5/5 in all extremities, DTR 1+ x 4  Skin: warm     CHRONIC MEDICAL DIAGNOSES:      Greater than 31 minutes were spent with the patient on counseling and coordination of care    Signed:   Mehnaz Bradley MD  11/7/2023  10:50 AM

## 2023-11-07 NOTE — DISCHARGE INSTRUCTIONS
Discharge Instructions       PATIENT ID: Niki Farias  MRN: [id number]   YOB: 1955    DATE OF ADMISSION: 11/1/2023   DATE OF DISCHARGE: 11/7/2023    PRIMARY CARE PROVIDER: Matilde Victoria     ATTENDING PHYSICIAN: Noemi Hendricks MD   DISCHARGING PROVIDER: Noemi Hendricks MD    To contact this individual call [phone] and ask the  to page. If unavailable ask to be transferred the Adult Hospitalist Department. DISCHARGE DIAGNOSES CVA     CONSULTATIONS: [unfilled]    PROCEDURES/SURGERIES: * No surgery found *    PENDING TEST RESULTS:   At the time of discharge the following test results are still pending:     FOLLOW UP APPOINTMENTS:   [facility]     ADDITIONAL CARE RECOMMENDATIONS:     DIET: cardiac diet      ACTIVITY: activity as tolerated    WOUND CARE:     EQUIPMENT needed:       DISCHARGE MEDICATIONS:   See Medication Reconciliation Form    It is important that you take the medication exactly as they are prescribed. Keep your medication in the bottles provided by the pharmacist and keep a list of the medication names, dosages, and times to be taken in your wallet. Do not take other medications without consulting your doctor. NOTIFY YOUR PHYSICIAN FOR ANY OF THE FOLLOWING:   Fever over 101 degrees for 24 hours. Chest pain, shortness of breath, fever, chills, nausea, vomiting, diarrhea, change in mentation, falling, weakness, bleeding. Severe pain or pain not relieved by medications. Or, any other signs or symptoms that you may have questions about.       DISPOSITION:    Home With:   OT  PT  HH  RN      x SNF/Inpatient Rehab/LTAC    Independent/assisted living    Hospice    Other:     CDMP Checked:   Yes x     PROBLEM LIST Updated:  Yes x       Signed:   Noemi Hendricks MD  11/7/2023  10:47 AM

## 2023-11-07 NOTE — CARE COORDINATION
CM attempted initial assessment, Pt VANDANA in MRI. Pt is a readmission (discharged home with home health through [facility] on 10/21). Plan to follow up this afternoon. JESSY Alva.
Transition of Care Plan:    IPR - Referral sent to [facility] (will require insurance auth through [facility] Medicare)  - Physiatry consulted    Back up plan: Pt DC home with home health PT/OT/SN through [facility]: BLS vs Family     RUR: 33%  Prior Level of Functioning: required assist with ADLs  Disposition: IPR  If SNF or IPR: Date FOC offered: 11/3  Date [address] received: 11/3  Accepting facility: Pending with [facility]  Date authorization started with reference number:   Date authorization received and expires: Follow up appointments: PCP, Neuro, Nephro   DME needed: defer to IPR  Transportation at discharge: S  Caregiver Contact: Daughter, Nelia Solomon [phone]  Discharge Caregiver contacted prior to discharge? Care Conference needed? No  Barriers to discharge: Medical, Pt orthostatic; Placement - referral pending with [facility], will need insurance auth     PT/OT and Dr. Hiro Reyez recommending IPR for Pt at discharge. PEREZ met with Pt at bedside to discuss. Pt is hesitant, wanted to go home. However does understand recommendation and is willing to have referral sent to [facility]. PEREZ spoke with Pt's daughter, Dev Crowley regarding recommendation, she is also in agreement with IPR at [facility]. Referral sent. Pt will require insurance auth through [facility], physiatry consulted. 1213: [facility] is not able to accept Pt due to PD. Message left for Pt's daughter regarding additional referral.     PEREZ spoke with Pt and she is in agreement to have referral sent to [facility]. Referral sent. JESSY Campa.
Transition of Care Plan:    IPR - [facility] accepted patient(pending insurance auth through [facility] Medicare, started 11/6/2023)  - Physiatry consulted    Transport: BLS vs Family     RUR: 32%  Prior Level of Functioning: assistance with ADLs  Disposition: IPR [facility]  If SNF or IPR: Date FOC offered: 11/3  Date [address] received: 11/3  Accepting facility: [facility]  Date authorization started with reference number: 11/6/23  Date authorization received and expires: Follow up appointments: PCP, Neuro, Nephro   DME needed: defer to IPR  Transportation at discharge: S  Caregiver Contact: DaughterErvin [phone]  Care Conference needed? No  Barriers to discharge: Medical, [facility] IPR accepted, pending auth    [facility] patient. Auth started 11/6/2023.     Christy Alvarado RN/CRM
Transition of Care Plan:    Nursing to call report to [address] [place] [address] [phone], going to room 2153. IPR - [facility] accepted patient and has a bed today    Transport: Family around [address]: 33%  Prior Level of Functioning: assistance with ADLs  Disposition: IPR [facility]  If SNF or IPR: Date FOC offered: 11/3  Date [address] received: 11/3  Accepting facility: [facility]  Date authorization started with reference number: 11/6/23  Date authorization received: 11/7/23  Follow up appointments: PCP, Neuro, Nephro   DME needed: defer to IPR  Transportation at discharge: family  IM/IMM Medicare/ letter given: 11/7/23  Caregiver Contact: Daughter, Juju Rocha [phone]  Care Conference needed? No  Barriers to discharge: None     [facility] IPR has accepted patient. Auth approved 11/7/2023. CM met with patient at bedside to introduce self and explain role. CM updated patient on discharge plans. Patient prefers family transport her to [address] [place] [address] [place] [place], patient is calling her daughter to arrange a time and will let CM know.  Planning for around [address], RN/CRM
Physician after you left the hospital, before you returned this time? No   Why weren't you able to visit your PCP? Did not have an appointment   Did you see a specialist, such as Cardiac, Pulmonary, Orthopedic Physician, etc. after you left the hospital? No   Who advised the patient to return to the hospital? Self-referral   Does the patient report anything that got in the way of taking their medications? No   What reasons did they give? Other (Comment)  (None)   In our efforts to provide the best possible care to you and others like you, can you think of anything that we could have done to help you after you left the hospital the first time, so that you might not have needed to return so soon? Other (Comment)       Elaine Cowart, M.S.W.

## 2023-11-12 PROBLEM — R79.89 ELEVATED TROPONIN: Status: RESOLVED | Noted: 2023-10-13 | Resolved: 2023-11-12

## 2023-11-16 ENCOUNTER — HOME HEALTH ADMISSION (OUTPATIENT)
Dept: HOME HEALTH SERVICES | Facility: HOME HEALTH | Age: 68
End: 2023-11-16
Payer: MEDICARE

## 2023-11-18 ENCOUNTER — HOME CARE VISIT (OUTPATIENT)
Facility: HOME HEALTH | Age: 68
End: 2023-11-18
Payer: MEDICARE

## 2023-11-18 PROCEDURE — G0299 HHS/HOSPICE OF RN EA 15 MIN: HCPCS

## 2023-11-19 VITALS
RESPIRATION RATE: 16 BRPM | DIASTOLIC BLOOD PRESSURE: 84 MMHG | HEART RATE: 100 BPM | SYSTOLIC BLOOD PRESSURE: 150 MMHG | TEMPERATURE: 98.1 F | OXYGEN SATURATION: 99 %

## 2023-11-19 ASSESSMENT — ENCOUNTER SYMPTOMS
PAIN LOCATION - PAIN QUALITY: DULL
DYSPNEA ACTIVITY LEVEL: AFTER AMBULATING 10 - 20 FT

## 2023-11-19 NOTE — HOME HEALTH
Reason for referral, PMH SUMMARY of clinical condition: Patient is a 58-year-old woman with PMH of end-stage renal disease, on peritoneal dialysis; type 2 diabetes; COPD; hypertension; congestive heart failure; and coronary artery disease, status post CABG; patient went to [facility] with slurred speech and left facial droop. PAtient treated for CVA and then to inpatient rehab. Patient is now home and to continue rehab services and SN for instruction on disease process and medication check and education. Clinical Assessment/Skilled reason for admission to home health: Patient visit post just completing peritoneal dialysis. Patient disconnects self, Infection control reinforced. Medication buuble packed and reviewed changes with daughter and patient. Skin check on sacrum and instructed on pressure relief and need to change position frequently. Diagnosis: CVA  Subjective; I dont get up by myself    Caregiver: Suzan Rooney came after arrival and does help with medications, arranging visits and takes to visits. Peritoneal dialysis  Medications reconciled and all medications are available in the home this visit. The following education was provided regarding medications: medication interactions and look alike medications:  Patient/CG able to demonstrate knowledge through teach back with 95 percent accuracy. notified of any discrepancies/medication interactions n/a. A list of reconciled medications has been uploaded to media. High risk med teaching was performed on High risk medication education as follows; ANTICOAGULANTS -Aspirin/plavix  Instructed pt/CG can select appropriate dose ( Medication/dose/frequency). No ASA or NSAIDS unless ordered by MD.  S & S excessive anticoagulation are: Pink/red urine, swelling/pain of joints,  tarry stools, unusual/excessive bruising or bleeding. Reviewed sharps precautions  (No razors, soft toothbrush, etc.).   Instructed  that falls, hitting head or bleeding that

## 2023-11-20 ENCOUNTER — HOME CARE VISIT (OUTPATIENT)
Facility: HOME HEALTH | Age: 68
End: 2023-11-20
Payer: MEDICARE

## 2023-11-20 PROCEDURE — G0152 HHCP-SERV OF OT,EA 15 MIN: HCPCS

## 2023-11-21 ENCOUNTER — HOME CARE VISIT (OUTPATIENT)
Facility: HOME HEALTH | Age: 68
End: 2023-11-21
Payer: MEDICARE

## 2023-11-21 VITALS
HEART RATE: 92 BPM | RESPIRATION RATE: 18 BRPM | SYSTOLIC BLOOD PRESSURE: 128 MMHG | DIASTOLIC BLOOD PRESSURE: 70 MMHG | OXYGEN SATURATION: 99 % | TEMPERATURE: 97.5 F

## 2023-11-21 PROCEDURE — G0299 HHS/HOSPICE OF RN EA 15 MIN: HCPCS

## 2023-11-22 NOTE — HOME HEALTH
Subjective: Pt CG asked what should she put on her feet now that the bandages have been removed. Falls since last visit No(if yes complete the Fall Tracking Form and include bsrifallreport):   Caregiver involvement changes: NA  Home health supplies by type and quantity ordered/delivered this visit include: NA    Clinician asked if patient has had any physician contact since last home care visit and patient states: NO  Clinician asked if patient has any new or changed medications and patient states:  NO   If Yes, were medications reconciled? N/A   Was the certifying physician notified of changes in medications? N/A     Clinical assessment (what this visit means for the patient overall and need for ongoing skilled care) and progress or lack of progress towards SPECIFIC goals: Pt seen by SN today to assessment and education. SN removed foam bandages to bilateral heels placed on 11/14/23 while pt was in the hospital. No s/s of infection, skin intact, skin dry and flaky, no redness or discoloration noted. Pt stated bandages were placed due to heels turning red. Pt denies heel pain or discomfort. Written Teaching Material Utilized: N/A    Interdisciplinary communication with:  Clinical Manager for the purpose of foot care and foam bandages    Discharge planning as follows:  Is no longer homebound and When goals are met    Specific plan for next visit: Educate on CHF disease process, DM foot care, Anticoagulant education

## 2023-11-25 ENCOUNTER — HOME CARE VISIT (OUTPATIENT)
Facility: HOME HEALTH | Age: 68
End: 2023-11-25
Payer: MEDICARE

## 2023-11-25 VITALS
OXYGEN SATURATION: 98 % | TEMPERATURE: 97.7 F | SYSTOLIC BLOOD PRESSURE: 130 MMHG | DIASTOLIC BLOOD PRESSURE: 80 MMHG | HEART RATE: 100 BPM

## 2023-11-25 ASSESSMENT — ENCOUNTER SYMPTOMS: SKIN LESIONS: 1

## 2023-11-25 NOTE — HOME HEALTH
Reason for referral, [facility] SUMMARY of clinical condition:   Pt. hospitalized 11/01/23-11/07/23 due to stroke symptoms, hyperglycemia. Pt. transferred to [address] [place] [address] [facility] [place] for inpt. rehab following acute hospitalization. Clinical Assessment/Skilled reason for admission to home health (What this means for the patient overall and need for ongoing skilled care):   Pt. has been hospitalized 7 times this year for various medical reasons. Pt. in bed upon O.T. arrival and did not recall that O. T. was scheduled (per son, pt. was told but she forgot). Pt. required min. assist. supine to sit transfer. Pt. demonstrated functional fine motor coordination to clamp off and disconnect tubing for peritaneal dialysis. Pt. demonstrated the ability to use her glucometer to check her blood sugar. Pt. required CGA for ambulation. Pt. demonstrates overall impaired functional mobility and decreased activity tolerence. Pt. known to this O.T. from episode in 2022 and pt. is below baseline level of functioning for all mobilty, ADLs and IADLs. Pt. will benefit from O.T. intervention to maximize overall safety and independence. See Interventions / Goals for additional details. 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 Subjective (statement from pt/cg that is relative to why you are there):   'I feel like I'm getting a little stronger. \"    Caregiver: various family members. Caregiver assists with: Medications, Meals, Bathing, ADL, IADL and Transportation Caregiver unable to assist with: Wound care. Caregiver is available 24 hours/day Caregiver is  present at this visit and did participate with clinician. Medications reviewed. Pt. reports she takes Trulicity on Mondays. Pt. reports she takes 5 units of Lantus. Patient at risk for falls:   Yes.    Recommended requesting PT/OT orders due to fall risk:   N/A.   PT/OT already

## 2023-11-27 ENCOUNTER — HOME CARE VISIT (OUTPATIENT)
Dept: HOME HEALTH SERVICES | Facility: HOME HEALTH | Age: 68
End: 2023-11-27
Payer: MEDICARE

## 2023-11-27 ENCOUNTER — HOME CARE VISIT (OUTPATIENT)
Facility: HOME HEALTH | Age: 68
End: 2023-11-27
Payer: MEDICARE

## 2023-11-27 VITALS
DIASTOLIC BLOOD PRESSURE: 78 MMHG | SYSTOLIC BLOOD PRESSURE: 120 MMHG | RESPIRATION RATE: 18 BRPM | OXYGEN SATURATION: 100 % | HEART RATE: 100 BPM | TEMPERATURE: 97.9 F

## 2023-11-27 PROCEDURE — G0151 HHCP-SERV OF PT,EA 15 MIN: HCPCS

## 2023-11-27 ASSESSMENT — ENCOUNTER SYMPTOMS: DYSPNEA ACTIVITY LEVEL: AFTER AMBULATING MORE THAN 20 FT

## 2023-11-28 ENCOUNTER — HOME CARE VISIT (OUTPATIENT)
Facility: HOME HEALTH | Age: 68
End: 2023-11-28
Payer: MEDICARE

## 2023-11-28 PROCEDURE — G0153 HHCP-SVS OF S/L PATH,EA 15MN: HCPCS

## 2023-11-28 PROCEDURE — G0299 HHS/HOSPICE OF RN EA 15 MIN: HCPCS

## 2023-11-28 ASSESSMENT — ENCOUNTER SYMPTOMS: DYSPNEA ACTIVITY LEVEL: AFTER AMBULATING 10 - 20 FT

## 2023-11-28 NOTE — HOME HEALTH
Subjective: Pt states she doesnt feel well today, she feels tired. Falls since last visit No(if yes complete the Fall Tracking Form and include bsrifallreport):   Caregiver involvement changes: na  Home health supplies by type and qauantity ordered/delivered this visit include: na    Clinician asked if patient has had any physician contact since last home care visit and patient states: NO  Clinician asked if patient has any new or changed medications and patient states:  NO   If Yes, were medications reconciled? N/A   Was the certifying physician notified of changes in medications? N/A     Clinical assessment (what this visit means for the patient overall and need for ongoing skilled care) and progress or lack of progress towards SPECIFIC goals: Pt seen today by SN for education and assessment. Pt states she feels tired today but denies any other symptoms. Pt states she will get some rest and Daughter will schedule follow up appointments with MD. Pt  requesting foam pads for bilateral heels, denies any issues with heels would just like them for protection. SN needed for continued education of CVA s/s and interventions. Written Teaching Material Utilized: N/A    Interdisciplinary communication with: N/A for the purpose of NA    Discharge planning as follows:  When goals are met    Specific plan for next visit: Educate on DM disease process, CVA lifestyle modifications, medication management

## 2023-11-29 ENCOUNTER — HOME CARE VISIT (OUTPATIENT)
Facility: HOME HEALTH | Age: 68
End: 2023-11-29
Payer: MEDICARE

## 2023-11-29 VITALS
SYSTOLIC BLOOD PRESSURE: 165 MMHG | OXYGEN SATURATION: 100 % | HEART RATE: 100 BPM | TEMPERATURE: 98 F | DIASTOLIC BLOOD PRESSURE: 78 MMHG

## 2023-11-29 ASSESSMENT — ENCOUNTER SYMPTOMS: DYSPNEA ACTIVITY LEVEL: AFTER AMBULATING 10 - 20 FT

## 2023-11-29 NOTE — HOME HEALTH
Subjective: Patient states she is feeling ok, just a little tired. Falls since last visit No(if yes complete the Fall Tracking Form and include bsrifallreport):   Caregiver involvement changes: None  Home health supplies by type and quantity ordered/delivered this visit include: None    Clinician asked if patient has had any physician contact since last home care visit and patient states: NO  Clinician asked if patient has any new or changed medications and patient states:  NO   If Yes, were medications reconciled? NA  Was the certifying physician notified of changes in medications? N/A     Clinical assessment (what this visit means for the patient overall and need for ongoing skilled care) and progress or lack of progress towards SPECIFIC goals: Patient is a 68yo female who was readmitted to home health after recent inpatient rehab stay for CVA. Patient was previously being treated for post op impairments from CABG. Patient is on peritoneal dialysis daily. She demonstrates impaired balance/gait as noted with Tinetti score of 13/28. Oculomotor testing performed for CNS impairments and patient demonstrated mild impairment with saccades and tracking. She was educated on standing marches and performing oculomotor exercises multiple times a day. She benefits from skilled PT to improve overall balance and ambulation on all surfaces. Written Teaching Material Utilized: HEP    Interdisciplinary communication with: Emmanuelle EGAN for the purpose of POC collaboration    Discharge planning as follows:  When goals are met    Specific plan for next visit: balance and exercise

## 2023-11-29 NOTE — HOME HEALTH
Reason for referral, [facility] SUMMARY of clinical condition: cognitive deficits due to recent CVA     Clinical Assessment/Skilled reason for admission to home health (What this means for the patient overall and need for ongoing skilled care): Pt participated in SLUMS evaluation and scored 26/30 revealing normal cognitive skills and no need for skilled speech intervention at this time. Diagnosis: CVA    Subjective (statement from pt/cg that is relative to why you are there): \"My memeory came back after that work I did at [employer]. \"    Caregiver: relative. Caregiver assists with: Transportation Caregiver unable to assist with: Bathing, ADL and Wound care. Caregiver is available Regularly Caregiver is not present at this visit and did not participate with clinician. Medications reconciled and all medications are available in the home this visit. The following education was provided regarding medications: medication interactions and look alike medications: . Patient/CG able to demonstrate knowledge through teach back with 100 percent accuracy. A list of reconciled medications has been given to the patient/caregiver . High risk med teaching was performed on 11/28/23    Patient at risk for falls Yes:   Recommended requesting PT/OT orders due to fall risk YES:   Patient response to recommended requesting of PT/OT orders: agreeable    Interdisciplinary communication with:  PT for the purpose of OASIS collaboration    Written Teaching Material Utilized: N/A    Clinician reviewed orientation to home health booklet with patient/caregiver including agency phone number, agency complaint process, state hotline number, as well as [employer]'s quality hotline number. Emergency preparedness reviewed with patient/caregiver in home health booklet. Consent forms signed    Patient/caregiver instructed on plan of care and are agreeable to plan of care at this time.       Plan of care and admission to home health

## 2023-11-30 ENCOUNTER — HOME CARE VISIT (OUTPATIENT)
Facility: HOME HEALTH | Age: 68
End: 2023-11-30
Payer: MEDICARE

## 2023-11-30 VITALS
SYSTOLIC BLOOD PRESSURE: 146 MMHG | RESPIRATION RATE: 18 BRPM | HEART RATE: 98 BPM | OXYGEN SATURATION: 100 % | TEMPERATURE: 98 F | DIASTOLIC BLOOD PRESSURE: 78 MMHG

## 2023-11-30 VITALS — OXYGEN SATURATION: 100 % | HEART RATE: 100 BPM | TEMPERATURE: 98 F | RESPIRATION RATE: 18 BRPM

## 2023-11-30 PROCEDURE — G0151 HHCP-SERV OF PT,EA 15 MIN: HCPCS

## 2023-11-30 NOTE — HOME HEALTH
Subjective: Patient states she is tired today and just got up not too long ago. Falls since last visit No(if yes complete the Fall Tracking Form and include bsrifallreport):   Caregiver involvement changes: None  Home health supplies by type and quantity ordered/delivered this visit include: None    Clinician asked if patient has had any physician contact since last home care visit and patient states: NO  Clinician asked if patient has any new or changed medications and patient states:  NO   If Yes, were medications reconciled? N/A   Was the certifying physician notified of changes in medications? N/A     Clinical assessment (what this visit means for the patient overall and need for ongoing skilled care) and progress or lack of progress towards SPECIFIC goals: Patient demonstrates impaired strength. Goal not met. Patient requires continued education on safety with use of rolling walker. Gait goal not met    Written Teaching Material Utilized: HEP    Interdisciplinary communication with: Antione Banda RN for the purpose of POC collaboration    Discharge planning as follows:  Will discharge when the patient has reached their maximum functional potential and maximum safety in their home    Specific plan for next visit: balance and exercise

## 2023-12-01 ENCOUNTER — HOME CARE VISIT (OUTPATIENT)
Facility: HOME HEALTH | Age: 68
End: 2023-12-01
Payer: MEDICARE

## 2023-12-04 ENCOUNTER — HOME CARE VISIT (OUTPATIENT)
Facility: HOME HEALTH | Age: 68
End: 2023-12-04
Payer: MEDICARE

## 2023-12-04 VITALS
TEMPERATURE: 98 F | RESPIRATION RATE: 18 BRPM | SYSTOLIC BLOOD PRESSURE: 140 MMHG | OXYGEN SATURATION: 99 % | HEART RATE: 96 BPM | DIASTOLIC BLOOD PRESSURE: 80 MMHG

## 2023-12-04 PROCEDURE — G0151 HHCP-SERV OF PT,EA 15 MIN: HCPCS

## 2023-12-05 NOTE — HOME HEALTH
Subjective: Patient states she has no pain and nursing was in earlier to see her. Falls since last visit No(if yes complete the Fall Tracking Form and include bsrifallreport):   Caregiver involvement changes: None  Home health supplies by type and quantity ordered/delivered this visit include: None    Clinician asked if patient has had any physician contact since last home care visit and patient states: NO  Clinician asked if patient has any new or changed medications and patient states:  NO   If Yes, were medications reconciled? N/A   Was the certifying physician notified of changes in medications? N/A     Clinical assessment (what this visit means for the patient overall and need for ongoing skilled care) and progress or lack of progress towards SPECIFIC goals: Patient demonstrates impaired safety with stair climbing. Stair goal not met. Patient with impaired balance during exercises. Balance goal not met. Written Teaching Material Utilized: N/A    Interdisciplinary communication with: N/A    Discharge planning as follows:  Will discharge when the patient has reached their maximum functional potential and maximum safety in their home    Specific plan for next visit: balance and exercise

## 2023-12-06 ENCOUNTER — HOME CARE VISIT (OUTPATIENT)
Facility: HOME HEALTH | Age: 68
End: 2023-12-06
Payer: MEDICARE

## 2023-12-06 PROCEDURE — G0152 HHCP-SERV OF OT,EA 15 MIN: HCPCS

## 2023-12-07 ENCOUNTER — HOME CARE VISIT (OUTPATIENT)
Dept: HOME HEALTH SERVICES | Facility: HOME HEALTH | Age: 68
End: 2023-12-07
Payer: MEDICARE

## 2023-12-07 NOTE — HOME HEALTH
Child/Adolescent [facility] Daily Treatment Note      Number of patients in group today: 8    Pt arrived to group late today at 12:20 PM. Pt was observed at tearful when joining group.     The PT agreed to completing a brief check-in before starting the educational portion of the day. The PT reported that their mood rating is a 3 out of 10. The PT reported that they are feeling \"anxious\" today. The PT reported that this morning they woke up not feeling well, due to stopping their medication. The PT reported that she allowed this feeling to turn into anxious thoughts that something more might be going on in her body. The PT reported that she then had high Anxiety and felt like she could not come to group. The PT reported that she took a PRN anxiety medication and she was able to get to group. The PT reported that she was feeling anxious due to her plans after group as well. The PT reported that she has plans to go to the mall with her mom after group today. The PT reported that last week when she was at the mall she had a \"panic attack.\" The PT reported that she is fearful that she will have another panic attack today. Staff asked the PT to work through anxiety thought challenging together for this situation. The PT identified the trigger of going to the mall being that there would be \"a lot of different people\" at the mall. The PT reported that the worst case scenario being she has a panic attack, the best case scenario she enjoys shopping at the mall with her mom and the likely scenario being she has high anxiety while shopping and she has to leave the mall. Staff asked if it would matter in one week, one month and one year if the worst case scenario occurred. The PT denied that it would matter in one week if she had a panic attack. The PT identified an unrealistic thought that if she goes to the mall she will have high anxiety and have a panic attack. The PT created a realistic thought that she can go  Patient unavailable for visit today d/t MD appointment. Will follow up next week. to the mall and have high anxiety but that she will cope with it and be able to remain at the mall. The PT reported that she felt better about this thought challenge. The PT reported that she would cope with anxiety related to going to the mall by listening to music on the way there.     From 12:30 to 13:30 the PT attended an educational activity focused on Challenging Negative Thinking Errors. The PT engaged well and was willing to come up to the board and answer questions based on labeling thinking errors.      From 13:30 to 14:00 Pt participated in a goal setting group. PT reported goal for this evening to go to the mall. Pt expressed no safety concerns about returning home.     Mery Mendes LCP-IT      3/23/2022

## 2023-12-08 VITALS
SYSTOLIC BLOOD PRESSURE: 140 MMHG | HEART RATE: 96 BPM | OXYGEN SATURATION: 99 % | DIASTOLIC BLOOD PRESSURE: 88 MMHG | TEMPERATURE: 97.7 F

## 2023-12-08 ASSESSMENT — ENCOUNTER SYMPTOMS: SKIN LESIONS: 1

## 2023-12-08 NOTE — HOME HEALTH
Subjective:  \"I cooked on Thanksgiving. \"  Falls since last visit:  None.  (if yes complete the Fall Tracking Form and include bsrifallreport):   Caregiver involvement changes:  None. Home health supplies by type and quantity ordered/delivered this visit include:  N/A. Clinician asked if patient has had any physician contact since last home care visit and patient states: No.  Clinician asked if patient has any new or changed medications and patient states:  No.  If Yes, were medications reconciled? N/A. Was the certifying physician notified of changes in medications? N/A. Clinical assessment (what this visit means for the patient overall and need for ongoing skilled care) and progress or lack of progress towards SPECIFIC goals:   Pt. has not been seen since inititial eval secondary to missed visits week of 12/03/23-12/09/23. Pt. demonstrates signficant overall improvement since that time. Pt. demonstrated bed transfers and bed mobility unassisted and demonstrated tub transfer using shower seat with supervision / stand by assist.   Pt. reports her daughter is with her when she showers. Pt. simulated bathing tasks including how she washes her back and feet. Pt. demonstrated functional standing balance and tolerence to wash dishes in the ktichen. Pt. has achieved O.T. goals and is ready for d/c. See Interventions / Goals for additional details. Written Teaching Material Utilized: Written d/c instructions provided. Interdisciplinary communication with:  O.T. will notify team members of d/c via In Basket message. Discharge planning as follows: O.T. d/c today.     Specific plan for next visit:  N/A.

## 2023-12-08 NOTE — OP NOTE
[address], [address] [facility]                 NAME:  Sunshine Campbell   :   1955   MRN:   [id number]     Date/Time:  10/17/2023 9:55 AM    Esophagogastroduodenoscopy (EGD) Procedure Note    :  Albert Horton MD    Staff: Circulator: Eleanor Ruiz RN  Endoscopy Technician: Melrose Runner     Referring Provider:  Severiano Kindler, MD    Anethesia/Sedation:  MAC anesthesia Propofol    Preoperative diagnosis: Abdominal pain, unspecified abdominal location [R10.9], nausea, gastric ulcers, H pylori gastritis      Procedure Details     After infom consent was obtained for the procedure, with all risks and benefits of procedure explained the patient was taken to the endoscopy suite and placed in the left lateral decubitus position. Following sequential administration of sedation as per above, the [id number] gastroscope was inserted into the mouth and advanced under direct vision to second portion of the duodenum. A careful inspection was made as the gastroscope was withdrawn, including a retroflexed view of the proximal stomach; findings and interventions are described below. Findings:  Esophagus:normal  Stomach:Patchy erythema and congestion in antrum, biopsies done, no ulcers seen. Duodenum/jejunum:normal      Therapies:  none    Specimens: antral bx           EBL: None    Complications:   None; patient tolerated the procedure well. Impression:    See Postoperative diagnosis above    Recommendations:  -Acid suppression with a proton pump inhibitor. , -Await pathology.  Cardiac diet      Albert Horton MD
Internal Medicine
Rehab Medicine

## 2023-12-12 ENCOUNTER — HOME CARE VISIT (OUTPATIENT)
Facility: HOME HEALTH | Age: 68
End: 2023-12-12
Payer: MEDICARE

## 2023-12-12 VITALS
DIASTOLIC BLOOD PRESSURE: 80 MMHG | SYSTOLIC BLOOD PRESSURE: 110 MMHG | RESPIRATION RATE: 16 BRPM | OXYGEN SATURATION: 99 % | TEMPERATURE: 97.6 F | HEART RATE: 93 BPM

## 2023-12-12 PROCEDURE — G0151 HHCP-SERV OF PT,EA 15 MIN: HCPCS

## 2023-12-12 NOTE — HOME HEALTH
Subjective: Patient states she doesn't have any pain today, she just feels tired. Falls since last visit No(if yes complete the Fall Tracking Form and include bsrifallreport):   Caregiver involvement changes: no change  Home health supplies by type and quantity ordered/delivered this visit include: n/a    Clinician asked if patient has had any physician contact since last home care visit and patient states: NO  Clinician asked if patient has any new or changed medications and patient states:  NO    If Yes, were medications reconciled? N/A    Was the certifying physician notified of changes in medications? N/A      Clinical assessment (what this visit means for the patient overall and need for ongoing skilled care) and progress or lack of progress towards SPECIFIC goals: Patient was frequently short or breath with standing exercise and ambulating 50 ft but she recovered quickly after a short rest.  Patient did not want to go outside to train on the stairs today. Written Teaching Material Utilized: written HEP    Interdisciplinary communication with: Fly Ervin PT for the purpose of POC collaboration    Discharge planning as follows:  When goals are met    Specific plan for next visit: progress stair training if patient is willing, otherwise, patient may be reaching her max potential.

## 2023-12-13 PROBLEM — G45.9 TRANSIENT ISCHEMIC ATTACK (TIA): Status: ACTIVE | Noted: 2023-12-13

## 2023-12-13 RX ORDER — INSULIN GLARGINE 100 [IU]/ML
INJECTION, SOLUTION SUBCUTANEOUS
Qty: 10 ML | Refills: 3 | OUTPATIENT
Start: 2023-12-13

## 2023-12-14 ENCOUNTER — HOME CARE VISIT (OUTPATIENT)
Facility: HOME HEALTH | Age: 68
End: 2023-12-14
Payer: MEDICARE

## 2023-12-15 PROBLEM — I63.9 ACUTE CEREBROVASCULAR ACCIDENT (CVA) (HCC): Status: ACTIVE | Noted: 2023-12-15

## 2023-12-15 PROBLEM — G45.9 TRANSIENT ISCHEMIC ATTACK (TIA): Status: RESOLVED | Noted: 2023-12-13 | Resolved: 2023-12-15

## 2023-12-15 NOTE — HOME HEALTH
Visit missed due to pt is at the / [facility] [place], MD notified.

## 2023-12-26 ENCOUNTER — HOME CARE VISIT (OUTPATIENT)
Facility: HOME HEALTH | Age: 68
End: 2023-12-26
Payer: MEDICARE

## 2023-12-26 NOTE — HOME HEALTH
Visit missed due to called daughter to set up message last night on 12/25 and left a message to call back and confirm visit, then called again this morning and left another message no call back so visit was missed due to not able to reach daughter to confirm time for visit, MD notified.

## 2023-12-28 ENCOUNTER — HOME CARE VISIT (OUTPATIENT)
Facility: HOME HEALTH | Age: 68
End: 2023-12-28
Payer: MEDICARE

## 2023-12-28 VITALS
BODY MASS INDEX: 25.24 KG/M2 | SYSTOLIC BLOOD PRESSURE: 140 MMHG | OXYGEN SATURATION: 100 % | DIASTOLIC BLOOD PRESSURE: 80 MMHG | TEMPERATURE: 97.8 F | WEIGHT: 166 LBS | RESPIRATION RATE: 20 BRPM | HEART RATE: 101 BPM

## 2023-12-28 VITALS
HEART RATE: 78 BPM | SYSTOLIC BLOOD PRESSURE: 126 MMHG | OXYGEN SATURATION: 97 % | TEMPERATURE: 97.8 F | RESPIRATION RATE: 18 BRPM | DIASTOLIC BLOOD PRESSURE: 60 MMHG

## 2023-12-28 PROCEDURE — G0300 HHS/HOSPICE OF LPN EA 15 MIN: HCPCS

## 2023-12-28 PROCEDURE — G0151 HHCP-SERV OF PT,EA 15 MIN: HCPCS

## 2023-12-28 NOTE — HOME HEALTH
Subjective: Patient is sweating after eating food. She says this always happen. She also states SOB was bad last night and had to sit up a lot during the night  Falls since last visit No(if yes complete the Fall Tracking Form and include bsrifallreport):   Caregiver involvement changes: None  Home health supplies by type and quantity ordered/delivered this visit include: None    Clinician asked if patient has had any physician contact since last home care visit and patient states: NO  Clinician asked if patient has any new or changed medications and patient states:  NO   If Yes, were medications reconciled? N/A   Was the certifying physician notified of changes in medications? N/A     Clinical assessment (what this visit means for the patient overall and need for ongoing skilled care) and progress or lack of progress towards SPECIFIC goals: Patient is independent with HEP. Goal met. Patient requires continued education on gait safety and endurance. Goal not met. Written Teaching Material Utilized: HEP    Interdisciplinary communication with: Rach Rivera LPN and Conway Primrose, MD for weight gain and dyspnea concern    Discharge planning as follows:  When goals are met    Specific plan for next visit: exercise and gait

## 2024-01-01 ENCOUNTER — APPOINTMENT (OUTPATIENT)
Facility: HOSPITAL | Age: 69
DRG: 947 | End: 2024-01-01
Payer: MEDICARE

## 2024-01-01 ENCOUNTER — TELEPHONE (OUTPATIENT)
Age: 69
End: 2024-01-01

## 2024-01-01 ENCOUNTER — HOSPITAL ENCOUNTER (INPATIENT)
Facility: HOSPITAL | Age: 69
LOS: 9 days | DRG: 947 | End: 2024-04-25
Attending: EMERGENCY MEDICINE | Admitting: FAMILY MEDICINE
Payer: MEDICARE

## 2024-01-01 VITALS
TEMPERATURE: 98.5 F | WEIGHT: 203.04 LBS | SYSTOLIC BLOOD PRESSURE: 99 MMHG | OXYGEN SATURATION: 98 % | BODY MASS INDEX: 35.98 KG/M2 | DIASTOLIC BLOOD PRESSURE: 37 MMHG | HEIGHT: 63 IN

## 2024-01-01 DIAGNOSIS — E87.5 HYPERKALEMIA: ICD-10-CM

## 2024-01-01 DIAGNOSIS — N18.6 ESRD ON PERITONEAL DIALYSIS (HCC): ICD-10-CM

## 2024-01-01 DIAGNOSIS — E87.20 LACTIC ACIDOSIS: ICD-10-CM

## 2024-01-01 DIAGNOSIS — R41.82 ALTERED MENTAL STATUS, UNSPECIFIED ALTERED MENTAL STATUS TYPE: Primary | ICD-10-CM

## 2024-01-01 DIAGNOSIS — I95.1 ORTHOSTATIC HYPOTENSION: ICD-10-CM

## 2024-01-01 DIAGNOSIS — Z99.2 ESRD ON PERITONEAL DIALYSIS (HCC): ICD-10-CM

## 2024-01-01 LAB
ABO + RH BLD: NORMAL
ACCESSION NUMBER, LLC1M: ABNORMAL
ACINETOBACTER CALCOAC BAUMANNII COMPLEX BY PCR: NOT DETECTED
ALBUMIN SERPL-MCNC: 1.5 G/DL (ref 3.5–5)
ALBUMIN SERPL-MCNC: 1.6 G/DL (ref 3.5–5)
ALBUMIN SERPL-MCNC: 1.6 G/DL (ref 3.5–5)
ALBUMIN SERPL-MCNC: 1.9 G/DL (ref 3.5–5)
ALBUMIN SERPL-MCNC: 2.6 G/DL (ref 3.5–5)
ALBUMIN SERPL-MCNC: 2.8 G/DL (ref 3.5–5)
ALBUMIN/GLOB SERPL: 0.3 (ref 1.1–2.2)
ALBUMIN/GLOB SERPL: 0.5 (ref 1.1–2.2)
ALBUMIN/GLOB SERPL: 0.6 (ref 1.1–2.2)
ALBUMIN/GLOB SERPL: 0.6 (ref 1.1–2.2)
ALBUMIN/GLOB SERPL: 1.1 (ref 1.1–2.2)
ALBUMIN/GLOB SERPL: 1.5 (ref 1.1–2.2)
ALP SERPL-CCNC: 118 U/L (ref 45–117)
ALP SERPL-CCNC: 126 U/L (ref 45–117)
ALP SERPL-CCNC: 138 U/L (ref 45–117)
ALP SERPL-CCNC: 184 U/L (ref 45–117)
ALP SERPL-CCNC: 193 U/L (ref 45–117)
ALP SERPL-CCNC: 271 U/L (ref 45–117)
ALT SERPL-CCNC: 15 U/L (ref 12–78)
ALT SERPL-CCNC: 16 U/L (ref 12–78)
ALT SERPL-CCNC: 44 U/L (ref 12–78)
ALT SERPL-CCNC: 51 U/L (ref 12–78)
ALT SERPL-CCNC: 8 U/L (ref 12–78)
ALT SERPL-CCNC: <6 U/L (ref 12–78)
AMMONIA PLAS-SCNC: 38 UMOL/L
ANION GAP SERPL CALC-SCNC: 10 MMOL/L (ref 5–15)
ANION GAP SERPL CALC-SCNC: 18 MMOL/L (ref 5–15)
ANION GAP SERPL CALC-SCNC: 21 MMOL/L (ref 5–15)
ANION GAP SERPL CALC-SCNC: 21 MMOL/L (ref 5–15)
ANION GAP SERPL CALC-SCNC: 25 MMOL/L (ref 5–15)
ANION GAP SERPL CALC-SCNC: 26 MMOL/L (ref 5–15)
ANION GAP SERPL CALC-SCNC: 27 MMOL/L (ref 5–15)
ANION GAP SERPL CALC-SCNC: 30 MMOL/L (ref 5–15)
ANION GAP SERPL CALC-SCNC: 6 MMOL/L (ref 5–15)
ANION GAP SERPL CALC-SCNC: 8 MMOL/L (ref 5–15)
ANION GAP SERPL CALC-SCNC: 8 MMOL/L (ref 5–15)
ANION GAP SERPL CALC-SCNC: 9 MMOL/L (ref 5–15)
APPEARANCE FLD: CLEAR
APPEARANCE FLD: CLEAR
APPEARANCE UR: ABNORMAL
APPEARANCE UR: CLEAR
APTT PPP: 43.9 SEC (ref 22.1–31)
AST SERPL-CCNC: 126 U/L (ref 15–37)
AST SERPL-CCNC: 32 U/L (ref 15–37)
AST SERPL-CCNC: 379 U/L (ref 15–37)
AST SERPL-CCNC: 509 U/L (ref 15–37)
AST SERPL-CCNC: 63 U/L (ref 15–37)
AST SERPL-CCNC: 9 U/L (ref 15–37)
B FRAGILIS DNA BLD POS QL NAA+NON-PROBE: NOT DETECTED
BACTERIA SPEC CULT: ABNORMAL
BACTERIA SPEC CULT: NORMAL
BACTERIA URNS QL MICRO: ABNORMAL /HPF
BACTERIA URNS QL MICRO: NEGATIVE /HPF
BASE DEFICIT BLD-SCNC: 12 MMOL/L
BASE DEFICIT BLD-SCNC: 15.1 MMOL/L
BASE DEFICIT BLD-SCNC: 17.8 MMOL/L
BASE DEFICIT BLD-SCNC: 19.7 MMOL/L
BASE DEFICIT BLD-SCNC: 20.2 MMOL/L
BASE DEFICIT BLD-SCNC: 8.2 MMOL/L
BASOPHILS # BLD: 0 K/UL (ref 0–0.1)
BASOPHILS # BLD: 0 K/UL (ref 0–0.1)
BASOPHILS # BLD: 0.1 K/UL (ref 0–0.1)
BASOPHILS NFR BLD: 0 % (ref 0–1)
BASOPHILS NFR BLD: 0 % (ref 0–1)
BASOPHILS NFR BLD: 1 % (ref 0–1)
BILIRUB SERPL-MCNC: 0.4 MG/DL (ref 0.2–1)
BILIRUB SERPL-MCNC: 0.8 MG/DL (ref 0.2–1)
BILIRUB SERPL-MCNC: 1.4 MG/DL (ref 0.2–1)
BILIRUB SERPL-MCNC: 1.7 MG/DL (ref 0.2–1)
BILIRUB SERPL-MCNC: 3.6 MG/DL (ref 0.2–1)
BILIRUB SERPL-MCNC: 5.7 MG/DL (ref 0.2–1)
BILIRUB UR QL: NEGATIVE
BILIRUB UR QL: NEGATIVE
BIOFIRE TEST COMMENT: ABNORMAL
BLACTX-M ISLT/SPM QL: NOT DETECTED
BLAIMP ISLT/SPM QL: NOT DETECTED
BLAKPC BLD POS QL NAA+NON-PROBE: NOT DETECTED
BLAOXA-48-LIKE ISLT/SPM QL: NOT DETECTED
BLAVIM ISLT/SPM QL: NOT DETECTED
BLD PROD TYP BPU: NORMAL
BLD PROD TYP BPU: NORMAL
BLOOD BANK BLOOD PRODUCT EXPIRATION DATE: NORMAL
BLOOD BANK BLOOD PRODUCT EXPIRATION DATE: NORMAL
BLOOD BANK DISPENSE STATUS: NORMAL
BLOOD BANK DISPENSE STATUS: NORMAL
BLOOD BANK ISBT PRODUCT BLOOD TYPE: 7300
BLOOD BANK ISBT PRODUCT BLOOD TYPE: 7300
BLOOD BANK PRODUCT CODE: NORMAL
BLOOD BANK PRODUCT CODE: NORMAL
BLOOD BANK UNIT TYPE AND RH: NORMAL
BLOOD BANK UNIT TYPE AND RH: NORMAL
BLOOD GROUP ANTIBODIES SERPL: NORMAL
BPU ID: NORMAL
BPU ID: NORMAL
BUN SERPL-MCNC: 11 MG/DL (ref 6–20)
BUN SERPL-MCNC: 12 MG/DL (ref 6–20)
BUN SERPL-MCNC: 12 MG/DL (ref 6–20)
BUN SERPL-MCNC: 14 MG/DL (ref 6–20)
BUN SERPL-MCNC: 14 MG/DL (ref 6–20)
BUN SERPL-MCNC: 15 MG/DL (ref 6–20)
BUN SERPL-MCNC: 16 MG/DL (ref 6–20)
BUN SERPL-MCNC: 17 MG/DL (ref 6–20)
BUN SERPL-MCNC: 18 MG/DL (ref 6–20)
BUN SERPL-MCNC: 18 MG/DL (ref 6–20)
BUN SERPL-MCNC: 6 MG/DL (ref 6–20)
BUN SERPL-MCNC: 8 MG/DL (ref 6–20)
BUN/CREAT SERPL: 2 (ref 12–20)
BUN/CREAT SERPL: 3 (ref 12–20)
BUN/CREAT SERPL: 4 (ref 12–20)
C ALBICANS DNA BLD POS QL NAA+NON-PROBE: NOT DETECTED
C AURIS DNA BLD POS QL NAA+NON-PROBE: NOT DETECTED
C GATTII+NEOFOR DNA BLD POS QL NAA+N-PRB: NOT DETECTED
C GLABRATA DNA BLD POS QL NAA+NON-PROBE: NOT DETECTED
C KRUSEI DNA BLD POS QL NAA+NON-PROBE: NOT DETECTED
C PARAP DNA BLD POS QL NAA+NON-PROBE: NOT DETECTED
C TROPICLS DNA BLD POS QL NAA+NON-PROBE: NOT DETECTED
CA-I BLD-SCNC: 1.18 MMOL/L (ref 1.12–1.32)
CALCIUM SERPL-MCNC: 6.8 MG/DL (ref 8.5–10.1)
CALCIUM SERPL-MCNC: 6.8 MG/DL (ref 8.5–10.1)
CALCIUM SERPL-MCNC: 6.9 MG/DL (ref 8.5–10.1)
CALCIUM SERPL-MCNC: 6.9 MG/DL (ref 8.5–10.1)
CALCIUM SERPL-MCNC: 7 MG/DL (ref 8.5–10.1)
CALCIUM SERPL-MCNC: 7.3 MG/DL (ref 8.5–10.1)
CALCIUM SERPL-MCNC: 7.5 MG/DL (ref 8.5–10.1)
CALCIUM SERPL-MCNC: 7.8 MG/DL (ref 8.5–10.1)
CALCIUM SERPL-MCNC: 8.2 MG/DL (ref 8.5–10.1)
CALCIUM SERPL-MCNC: 8.2 MG/DL (ref 8.5–10.1)
CALCIUM SERPL-MCNC: 8.6 MG/DL (ref 8.5–10.1)
CALCIUM SERPL-MCNC: 8.9 MG/DL (ref 8.5–10.1)
CC UR VC: ABNORMAL
CHLORIDE SERPL-SCNC: 100 MMOL/L (ref 97–108)
CHLORIDE SERPL-SCNC: 101 MMOL/L (ref 97–108)
CHLORIDE SERPL-SCNC: 101 MMOL/L (ref 97–108)
CHLORIDE SERPL-SCNC: 102 MMOL/L (ref 97–108)
CHLORIDE SERPL-SCNC: 102 MMOL/L (ref 97–108)
CHLORIDE SERPL-SCNC: 103 MMOL/L (ref 97–108)
CHLORIDE SERPL-SCNC: 103 MMOL/L (ref 97–108)
CHLORIDE SERPL-SCNC: 104 MMOL/L (ref 97–108)
CHLORIDE SERPL-SCNC: 104 MMOL/L (ref 97–108)
CHLORIDE SERPL-SCNC: 105 MMOL/L (ref 97–108)
CHLORIDE SERPL-SCNC: 107 MMOL/L (ref 97–108)
CHLORIDE SERPL-SCNC: 107 MMOL/L (ref 97–108)
CO2 SERPL-SCNC: 10 MMOL/L (ref 21–32)
CO2 SERPL-SCNC: 11 MMOL/L (ref 21–32)
CO2 SERPL-SCNC: 14 MMOL/L (ref 21–32)
CO2 SERPL-SCNC: 15 MMOL/L (ref 21–32)
CO2 SERPL-SCNC: 22 MMOL/L (ref 21–32)
CO2 SERPL-SCNC: 23 MMOL/L (ref 21–32)
CO2 SERPL-SCNC: 24 MMOL/L (ref 21–32)
CO2 SERPL-SCNC: 25 MMOL/L (ref 21–32)
CO2 SERPL-SCNC: 27 MMOL/L (ref 21–32)
CO2 SERPL-SCNC: 9 MMOL/L (ref 21–32)
COLISTIN RES MCR-1 ISLT/SPM QL: NOT DETECTED
COLOR FLD: COLORLESS
COLOR FLD: COLORLESS
COLOR UR: ABNORMAL
COLOR UR: ABNORMAL
COMMENT:: NORMAL
COMMENT:: NORMAL
CREAT SERPL-MCNC: 1.56 MG/DL (ref 0.55–1.02)
CREAT SERPL-MCNC: 2.12 MG/DL (ref 0.55–1.02)
CREAT SERPL-MCNC: 2.83 MG/DL (ref 0.55–1.02)
CREAT SERPL-MCNC: 3.36 MG/DL (ref 0.55–1.02)
CREAT SERPL-MCNC: 4.25 MG/DL (ref 0.55–1.02)
CREAT SERPL-MCNC: 5.2 MG/DL (ref 0.55–1.02)
CREAT SERPL-MCNC: 5.54 MG/DL (ref 0.55–1.02)
CREAT SERPL-MCNC: 5.55 MG/DL (ref 0.55–1.02)
CREAT SERPL-MCNC: 5.6 MG/DL (ref 0.55–1.02)
CREAT SERPL-MCNC: 5.65 MG/DL (ref 0.55–1.02)
CREAT SERPL-MCNC: 5.69 MG/DL (ref 0.55–1.02)
CREAT SERPL-MCNC: 6.01 MG/DL (ref 0.55–1.02)
CROSSMATCH RESULT: NORMAL
CROSSMATCH RESULT: NORMAL
DIFFERENTIAL METHOD BLD: ABNORMAL
E CLOAC COMP DNA BLD POS NAA+NON-PROBE: NOT DETECTED
E COLI DNA BLD POS QL NAA+NON-PROBE: DETECTED
E FAECALIS DNA BLD POS QL NAA+NON-PROBE: NOT DETECTED
E FAECIUM DNA BLD POS QL NAA+NON-PROBE: NOT DETECTED
ECHO AO ASC DIAM: 3.2 CM
ECHO AO ASCENDING AORTA INDEX: 1.84 CM/M2
ECHO AO ROOT DIAM: 3.6 CM
ECHO AO ROOT INDEX: 2.07 CM/M2
ECHO AV AREA PEAK VELOCITY: 2.5 CM2
ECHO AV AREA/BSA PEAK VELOCITY: 1.4 CM2/M2
ECHO AV PEAK GRADIENT: 7 MMHG
ECHO AV PEAK VELOCITY: 1.3 M/S
ECHO AV VELOCITY RATIO: 0.85
ECHO BSA: 1.78 M2
ECHO EST RA PRESSURE: 8 MMHG
ECHO LA DIAMETER INDEX: 2.18 CM/M2
ECHO LA DIAMETER: 3.8 CM
ECHO LA TO AORTIC ROOT RATIO: 1.06
ECHO LA VOL A-L A2C: 81 ML (ref 22–52)
ECHO LA VOL A-L A4C: 86 ML (ref 22–52)
ECHO LA VOL MOD A2C: 72 ML (ref 22–52)
ECHO LA VOL MOD A4C: 83 ML (ref 22–52)
ECHO LA VOLUME AREA LENGTH: 87 ML
ECHO LA VOLUME INDEX A-L A2C: 47 ML/M2 (ref 16–34)
ECHO LA VOLUME INDEX A-L A4C: 49 ML/M2 (ref 16–34)
ECHO LA VOLUME INDEX AREA LENGTH: 50 ML/M2 (ref 16–34)
ECHO LA VOLUME INDEX MOD A2C: 41 ML/M2 (ref 16–34)
ECHO LA VOLUME INDEX MOD A4C: 48 ML/M2 (ref 16–34)
ECHO LV E' LATERAL VELOCITY: 5 CM/S
ECHO LV E' SEPTAL VELOCITY: 9 CM/S
ECHO LV FRACTIONAL SHORTENING: 26 % (ref 28–44)
ECHO LV INTERNAL DIMENSION DIASTOLE INDEX: 3.05 CM/M2
ECHO LV INTERNAL DIMENSION DIASTOLIC: 5.3 CM (ref 3.9–5.3)
ECHO LV INTERNAL DIMENSION SYSTOLIC INDEX: 2.24 CM/M2
ECHO LV INTERNAL DIMENSION SYSTOLIC: 3.9 CM
ECHO LV IVSD: 1.4 CM (ref 0.6–0.9)
ECHO LV MASS 2D: 352.5 G (ref 67–162)
ECHO LV MASS INDEX 2D: 202.6 G/M2 (ref 43–95)
ECHO LV POSTERIOR WALL DIASTOLIC: 1.6 CM (ref 0.6–0.9)
ECHO LV RELATIVE WALL THICKNESS RATIO: 0.6
ECHO LVOT AREA: 3.1 CM2
ECHO LVOT DIAM: 2 CM
ECHO LVOT PEAK GRADIENT: 5 MMHG
ECHO LVOT PEAK VELOCITY: 1.1 M/S
ECHO MV A VELOCITY: 1.66 M/S
ECHO MV AREA PHT: 3.5 CM2
ECHO MV E DECELERATION TIME (DT): 217.7 MS
ECHO MV E VELOCITY: 1.52 M/S
ECHO MV E/A RATIO: 0.92
ECHO MV E/E' LATERAL: 30.4
ECHO MV E/E' RATIO (AVERAGED): 23.64
ECHO MV MAX VELOCITY: 2.1 M/S
ECHO MV MEAN GRADIENT: 6 MMHG
ECHO MV MEAN VELOCITY: 1.6 M/S
ECHO MV PEAK GRADIENT: 18 MMHG
ECHO MV PRESSURE HALF TIME (PHT): 63.1 MS
ECHO MV REGURGITANT PEAK GRADIENT: 164 MMHG
ECHO MV REGURGITANT PEAK VELOCITY: 6.4 M/S
ECHO MV VTI: 34.4 CM
ECHO PV MAX VELOCITY: 1 M/S
ECHO PV PEAK GRADIENT: 4 MMHG
ECHO RIGHT VENTRICULAR SYSTOLIC PRESSURE (RVSP): 34 MMHG
ECHO RV FREE WALL PEAK S': 8 CM/S
ECHO RV TAPSE: 1 CM (ref 1.7–?)
ECHO TV REGURGITANT MAX VELOCITY: 2.54 M/S
ECHO TV REGURGITANT PEAK GRADIENT: 26 MMHG
EKG ATRIAL RATE: 112 BPM
EKG ATRIAL RATE: 90 BPM
EKG DIAGNOSIS: NORMAL
EKG DIAGNOSIS: NORMAL
EKG P AXIS: 61 DEGREES
EKG P AXIS: 69 DEGREES
EKG P-R INTERVAL: 134 MS
EKG P-R INTERVAL: 150 MS
EKG Q-T INTERVAL: 294 MS
EKG Q-T INTERVAL: 416 MS
EKG QRS DURATION: 80 MS
EKG QRS DURATION: 88 MS
EKG QTC CALCULATION (BAZETT): 401 MS
EKG QTC CALCULATION (BAZETT): 508 MS
EKG R AXIS: -22 DEGREES
EKG R AXIS: -5 DEGREES
EKG T AXIS: 130 DEGREES
EKG T AXIS: 154 DEGREES
EKG VENTRICULAR RATE: 112 BPM
EKG VENTRICULAR RATE: 90 BPM
ENTEROBACTERALES DNA BLD POS NAA+N-PRB: DETECTED
EOSINOPHIL # BLD: 0 K/UL (ref 0–0.4)
EOSINOPHIL # BLD: 0 K/UL (ref 0–0.4)
EOSINOPHIL # BLD: 0.2 K/UL (ref 0–0.4)
EOSINOPHIL # BLD: 0.3 K/UL (ref 0–0.4)
EOSINOPHIL # BLD: 0.3 K/UL (ref 0–0.4)
EOSINOPHIL # BLD: 0.4 K/UL (ref 0–0.4)
EOSINOPHIL NFR BLD: 0 % (ref 0–7)
EOSINOPHIL NFR BLD: 0 % (ref 0–7)
EOSINOPHIL NFR BLD: 3 % (ref 0–7)
EOSINOPHIL NFR BLD: 4 % (ref 0–7)
EOSINOPHIL NFR BLD: 4 % (ref 0–7)
EOSINOPHIL NFR BLD: 6 % (ref 0–7)
EPITH CASTS URNS QL MICRO: ABNORMAL /LPF
EPITH CASTS URNS QL MICRO: ABNORMAL /LPF
ERYTHROCYTE [DISTWIDTH] IN BLOOD BY AUTOMATED COUNT: 17.1 % (ref 11.5–14.5)
ERYTHROCYTE [DISTWIDTH] IN BLOOD BY AUTOMATED COUNT: 17.2 % (ref 11.5–14.5)
ERYTHROCYTE [DISTWIDTH] IN BLOOD BY AUTOMATED COUNT: 17.2 % (ref 11.5–14.5)
ERYTHROCYTE [DISTWIDTH] IN BLOOD BY AUTOMATED COUNT: 17.3 % (ref 11.5–14.5)
ERYTHROCYTE [DISTWIDTH] IN BLOOD BY AUTOMATED COUNT: 18.5 % (ref 11.5–14.5)
ERYTHROCYTE [DISTWIDTH] IN BLOOD BY AUTOMATED COUNT: 18.6 % (ref 11.5–14.5)
ERYTHROCYTE [DISTWIDTH] IN BLOOD BY AUTOMATED COUNT: 19 % (ref 11.5–14.5)
ERYTHROCYTE [DISTWIDTH] IN BLOOD BY AUTOMATED COUNT: 19.6 % (ref 11.5–14.5)
ERYTHROCYTE [DISTWIDTH] IN BLOOD BY AUTOMATED COUNT: 20.3 % (ref 11.5–14.5)
EST. AVERAGE GLUCOSE BLD GHB EST-MCNC: 123 MG/DL
GLOBULIN SER CALC-MCNC: 1.9 G/DL (ref 2–4)
GLOBULIN SER CALC-MCNC: 2.3 G/DL (ref 2–4)
GLOBULIN SER CALC-MCNC: 2.7 G/DL (ref 2–4)
GLOBULIN SER CALC-MCNC: 2.8 G/DL (ref 2–4)
GLOBULIN SER CALC-MCNC: 3.5 G/DL (ref 2–4)
GLOBULIN SER CALC-MCNC: 5.8 G/DL (ref 2–4)
GLUCOSE BLD STRIP.AUTO-MCNC: 101 MG/DL (ref 65–117)
GLUCOSE BLD STRIP.AUTO-MCNC: 101 MG/DL (ref 65–117)
GLUCOSE BLD STRIP.AUTO-MCNC: 103 MG/DL (ref 65–117)
GLUCOSE BLD STRIP.AUTO-MCNC: 105 MG/DL (ref 65–117)
GLUCOSE BLD STRIP.AUTO-MCNC: 111 MG/DL (ref 65–117)
GLUCOSE BLD STRIP.AUTO-MCNC: 117 MG/DL (ref 65–117)
GLUCOSE BLD STRIP.AUTO-MCNC: 124 MG/DL (ref 65–117)
GLUCOSE BLD STRIP.AUTO-MCNC: 127 MG/DL (ref 65–117)
GLUCOSE BLD STRIP.AUTO-MCNC: 129 MG/DL (ref 65–117)
GLUCOSE BLD STRIP.AUTO-MCNC: 131 MG/DL (ref 65–117)
GLUCOSE BLD STRIP.AUTO-MCNC: 131 MG/DL (ref 65–117)
GLUCOSE BLD STRIP.AUTO-MCNC: 132 MG/DL (ref 65–117)
GLUCOSE BLD STRIP.AUTO-MCNC: 136 MG/DL (ref 65–117)
GLUCOSE BLD STRIP.AUTO-MCNC: 141 MG/DL (ref 65–117)
GLUCOSE BLD STRIP.AUTO-MCNC: 145 MG/DL (ref 65–117)
GLUCOSE BLD STRIP.AUTO-MCNC: 148 MG/DL (ref 65–117)
GLUCOSE BLD STRIP.AUTO-MCNC: 148 MG/DL (ref 65–117)
GLUCOSE BLD STRIP.AUTO-MCNC: 149 MG/DL (ref 65–117)
GLUCOSE BLD STRIP.AUTO-MCNC: 151 MG/DL (ref 65–117)
GLUCOSE BLD STRIP.AUTO-MCNC: 153 MG/DL (ref 65–117)
GLUCOSE BLD STRIP.AUTO-MCNC: 162 MG/DL (ref 65–117)
GLUCOSE BLD STRIP.AUTO-MCNC: 170 MG/DL (ref 65–117)
GLUCOSE BLD STRIP.AUTO-MCNC: 180 MG/DL (ref 65–117)
GLUCOSE BLD STRIP.AUTO-MCNC: 184 MG/DL (ref 65–117)
GLUCOSE BLD STRIP.AUTO-MCNC: 220 MG/DL (ref 65–117)
GLUCOSE BLD STRIP.AUTO-MCNC: 222 MG/DL (ref 65–117)
GLUCOSE BLD STRIP.AUTO-MCNC: 33 MG/DL (ref 65–117)
GLUCOSE BLD STRIP.AUTO-MCNC: 43 MG/DL (ref 65–117)
GLUCOSE BLD STRIP.AUTO-MCNC: 67 MG/DL (ref 65–117)
GLUCOSE BLD STRIP.AUTO-MCNC: 80 MG/DL (ref 65–117)
GLUCOSE BLD STRIP.AUTO-MCNC: 85 MG/DL (ref 65–117)
GLUCOSE BLD STRIP.AUTO-MCNC: 88 MG/DL (ref 65–117)
GLUCOSE BLD STRIP.AUTO-MCNC: 91 MG/DL (ref 65–117)
GLUCOSE BLD STRIP.AUTO-MCNC: 91 MG/DL (ref 65–117)
GLUCOSE BLD STRIP.AUTO-MCNC: 94 MG/DL (ref 65–117)
GLUCOSE SERPL-MCNC: 109 MG/DL (ref 65–100)
GLUCOSE SERPL-MCNC: 109 MG/DL (ref 65–100)
GLUCOSE SERPL-MCNC: 110 MG/DL (ref 65–100)
GLUCOSE SERPL-MCNC: 135 MG/DL (ref 65–100)
GLUCOSE SERPL-MCNC: 137 MG/DL (ref 65–100)
GLUCOSE SERPL-MCNC: 142 MG/DL (ref 65–100)
GLUCOSE SERPL-MCNC: 147 MG/DL (ref 65–100)
GLUCOSE SERPL-MCNC: 151 MG/DL (ref 65–100)
GLUCOSE SERPL-MCNC: 155 MG/DL (ref 65–100)
GLUCOSE SERPL-MCNC: 158 MG/DL (ref 65–100)
GLUCOSE SERPL-MCNC: 162 MG/DL (ref 65–100)
GLUCOSE SERPL-MCNC: 163 MG/DL (ref 65–100)
GLUCOSE UR STRIP.AUTO-MCNC: 100 MG/DL
GLUCOSE UR STRIP.AUTO-MCNC: 100 MG/DL
GP B STREP DNA BLD POS QL NAA+NON-PROBE: NOT DETECTED
GRAM STN SPEC: NORMAL
GRAM STN SPEC: NORMAL
HAEM INFLU DNA BLD POS QL NAA+NON-PROBE: NOT DETECTED
HBA1C MFR BLD: 5.9 % (ref 4–5.6)
HBV SURFACE AB SER QL: NONREACTIVE
HBV SURFACE AB SER-ACNC: <3.1 MIU/ML
HBV SURFACE AG SER QL: <0.1 INDEX
HBV SURFACE AG SER QL: NEGATIVE
HCO3 BLD-SCNC: 10.9 MMOL/L (ref 22–26)
HCO3 BLD-SCNC: 13.2 MMOL/L (ref 22–26)
HCO3 BLD-SCNC: 14 MMOL/L (ref 22–26)
HCO3 BLD-SCNC: 14.9 MMOL/L (ref 22–26)
HCO3 BLD-SCNC: 9.1 MMOL/L (ref 22–26)
HCO3 BLD-SCNC: 9.3 MMOL/L (ref 22–26)
HCT VFR BLD AUTO: 22.7 % (ref 35–47)
HCT VFR BLD AUTO: 23.4 % (ref 35–47)
HCT VFR BLD AUTO: 23.4 % (ref 35–47)
HCT VFR BLD AUTO: 24.3 % (ref 35–47)
HCT VFR BLD AUTO: 24.6 % (ref 35–47)
HCT VFR BLD AUTO: 26.9 % (ref 35–47)
HCT VFR BLD AUTO: 28.2 % (ref 35–47)
HCT VFR BLD AUTO: 29.9 % (ref 35–47)
HCT VFR BLD AUTO: 31.2 % (ref 35–47)
HCT VFR BLD AUTO: 32.8 % (ref 35–47)
HCT VFR BLD AUTO: 36.3 % (ref 35–47)
HGB BLD-MCNC: 11.1 G/DL (ref 11.5–16)
HGB BLD-MCNC: 7 G/DL (ref 11.5–16)
HGB BLD-MCNC: 7.2 G/DL (ref 11.5–16)
HGB BLD-MCNC: 7.4 G/DL (ref 11.5–16)
HGB BLD-MCNC: 7.5 G/DL (ref 11.5–16)
HGB BLD-MCNC: 7.6 G/DL (ref 11.5–16)
HGB BLD-MCNC: 8.8 G/DL (ref 11.5–16)
HGB BLD-MCNC: 8.9 G/DL (ref 11.5–16)
HGB BLD-MCNC: 9.5 G/DL (ref 11.5–16)
HGB BLD-MCNC: 9.5 G/DL (ref 11.5–16)
HGB BLD-MCNC: 9.6 G/DL (ref 11.5–16)
HGB UR QL STRIP: ABNORMAL
HGB UR QL STRIP: ABNORMAL
HISTORY CHECK: NORMAL
HISTORY CHECK: NORMAL
HYALINE CASTS URNS QL MICRO: ABNORMAL /LPF (ref 0–5)
IMM GRANULOCYTES # BLD AUTO: 0 K/UL
IMM GRANULOCYTES # BLD AUTO: 0 K/UL
IMM GRANULOCYTES # BLD AUTO: 0 K/UL (ref 0–0.04)
IMM GRANULOCYTES NFR BLD AUTO: 0 %
IMM GRANULOCYTES NFR BLD AUTO: 0 %
IMM GRANULOCYTES NFR BLD AUTO: 0 % (ref 0–0.5)
IMM GRANULOCYTES NFR BLD AUTO: 1 % (ref 0–0.5)
INR PPP: 1.4 (ref 0.9–1.1)
K OXYTOCA DNA BLD POS QL NAA+NON-PROBE: NOT DETECTED
KETONES UR QL STRIP.AUTO: NEGATIVE MG/DL
KETONES UR QL STRIP.AUTO: NEGATIVE MG/DL
KLEBSIELLA SP DNA BLD POS QL NAA+NON-PRB: NOT DETECTED
KLEBSIELLA SP DNA BLD POS QL NAA+NON-PRB: NOT DETECTED
L MONOCYTOG DNA BLD POS QL NAA+NON-PROBE: NOT DETECTED
LACTATE BLD-SCNC: 16.47 MMOL/L (ref 0.4–2)
LACTATE BLD-SCNC: 3.57 MMOL/L (ref 0.4–2)
LACTATE BLD-SCNC: 4.68 MMOL/L (ref 0.4–2)
LACTATE BLD-SCNC: >18 MMOL/L (ref 0.4–2)
LACTATE SERPL-SCNC: 14.8 MMOL/L (ref 0.4–2)
LACTATE SERPL-SCNC: 15.4 MMOL/L (ref 0.4–2)
LACTATE SERPL-SCNC: 16.8 MMOL/L (ref 0.4–2)
LACTATE SERPL-SCNC: 18.4 MMOL/L (ref 0.4–2)
LACTATE SERPL-SCNC: 19.2 MMOL/L (ref 0.4–2)
LACTATE SERPL-SCNC: 20.4 MMOL/L (ref 0.4–2)
LACTATE SERPL-SCNC: 20.8 MMOL/L (ref 0.4–2)
LACTATE SERPL-SCNC: 22.9 MMOL/L (ref 0.4–2)
LACTATE SERPL-SCNC: 3.3 MMOL/L (ref 0.4–2)
LACTATE SERPL-SCNC: 3.3 MMOL/L (ref 0.4–2)
LACTATE SERPL-SCNC: 3.6 MMOL/L (ref 0.4–2)
LACTATE SERPL-SCNC: 3.6 MMOL/L (ref 0.4–2)
LACTATE SERPL-SCNC: 3.9 MMOL/L (ref 0.4–2)
LACTATE SERPL-SCNC: 4.2 MMOL/L (ref 0.4–2)
LACTATE SERPL-SCNC: 4.2 MMOL/L (ref 0.4–2)
LACTATE SERPL-SCNC: 4.6 MMOL/L (ref 0.4–2)
LEUKOCYTE ESTERASE UR QL STRIP.AUTO: ABNORMAL
LEUKOCYTE ESTERASE UR QL STRIP.AUTO: NEGATIVE
LYMPHOCYTES # BLD: 0.4 K/UL (ref 0.8–3.5)
LYMPHOCYTES # BLD: 1.4 K/UL (ref 0.8–3.5)
LYMPHOCYTES # BLD: 1.7 K/UL (ref 0.8–3.5)
LYMPHOCYTES # BLD: 1.8 K/UL (ref 0.8–3.5)
LYMPHOCYTES # BLD: 1.8 K/UL (ref 0.8–3.5)
LYMPHOCYTES # BLD: 2.3 K/UL (ref 0.8–3.5)
LYMPHOCYTES NFR BLD: 17 % (ref 12–49)
LYMPHOCYTES NFR BLD: 23 % (ref 12–49)
LYMPHOCYTES NFR BLD: 23 % (ref 12–49)
LYMPHOCYTES NFR BLD: 27 % (ref 12–49)
LYMPHOCYTES NFR BLD: 28 % (ref 12–49)
LYMPHOCYTES NFR BLD: 7 % (ref 12–49)
MAGNESIUM SERPL-MCNC: 1.6 MG/DL (ref 1.6–2.4)
MAGNESIUM SERPL-MCNC: 1.8 MG/DL (ref 1.6–2.4)
MAGNESIUM SERPL-MCNC: 2.1 MG/DL (ref 1.6–2.4)
MAGNESIUM SERPL-MCNC: 2.2 MG/DL (ref 1.6–2.4)
MAGNESIUM SERPL-MCNC: 2.3 MG/DL (ref 1.6–2.4)
MAGNESIUM SERPL-MCNC: 2.7 MG/DL (ref 1.6–2.4)
MCH RBC QN AUTO: 28.4 PG (ref 26–34)
MCH RBC QN AUTO: 28.5 PG (ref 26–34)
MCH RBC QN AUTO: 28.8 PG (ref 26–34)
MCH RBC QN AUTO: 29 PG (ref 26–34)
MCH RBC QN AUTO: 29 PG (ref 26–34)
MCH RBC QN AUTO: 29.2 PG (ref 26–34)
MCH RBC QN AUTO: 29.4 PG (ref 26–34)
MCH RBC QN AUTO: 29.5 PG (ref 26–34)
MCH RBC QN AUTO: 29.6 PG (ref 26–34)
MCHC RBC AUTO-ENTMCNC: 29.3 G/DL (ref 30–36.5)
MCHC RBC AUTO-ENTMCNC: 30.4 G/DL (ref 30–36.5)
MCHC RBC AUTO-ENTMCNC: 30.5 G/DL (ref 30–36.5)
MCHC RBC AUTO-ENTMCNC: 30.6 G/DL (ref 30–36.5)
MCHC RBC AUTO-ENTMCNC: 30.8 G/DL (ref 30–36.5)
MCHC RBC AUTO-ENTMCNC: 30.8 G/DL (ref 30–36.5)
MCHC RBC AUTO-ENTMCNC: 31.6 G/DL (ref 30–36.5)
MCHC RBC AUTO-ENTMCNC: 32.5 G/DL (ref 30–36.5)
MCHC RBC AUTO-ENTMCNC: 32.7 G/DL (ref 30–36.5)
MCV RBC AUTO: 90 FL (ref 80–99)
MCV RBC AUTO: 91.1 FL (ref 80–99)
MCV RBC AUTO: 91.9 FL (ref 80–99)
MCV RBC AUTO: 93.4 FL (ref 80–99)
MCV RBC AUTO: 93.4 FL (ref 80–99)
MCV RBC AUTO: 94.4 FL (ref 80–99)
MCV RBC AUTO: 96 FL (ref 80–99)
MCV RBC AUTO: 96.5 FL (ref 80–99)
MCV RBC AUTO: 97.3 FL (ref 80–99)
METAMYELOCYTES NFR BLD MANUAL: 4 %
METAMYELOCYTES NFR BLD MANUAL: 4 %
MONOCYTES # BLD: 0.2 K/UL (ref 0–1)
MONOCYTES # BLD: 0.3 K/UL (ref 0–1)
MONOCYTES # BLD: 0.4 K/UL (ref 0–1)
MONOCYTES # BLD: 0.4 K/UL (ref 0–1)
MONOCYTES # BLD: 0.6 K/UL (ref 0–1)
MONOCYTES # BLD: 0.7 K/UL (ref 0–1)
MONOCYTES NFR BLD: 3 % (ref 5–13)
MONOCYTES NFR BLD: 5 % (ref 5–13)
MONOCYTES NFR BLD: 6 % (ref 5–13)
MONOCYTES NFR BLD: 9 % (ref 5–13)
MYELOCYTES NFR BLD MANUAL: 10 %
MYELOCYTES NFR BLD MANUAL: 5 %
N MEN DNA BLD POS QL NAA+NON-PROBE: NOT DETECTED
NEUTS BAND NFR BLD MANUAL: 4 % (ref 0–6)
NEUTS SEG # BLD: 3.9 K/UL (ref 1.8–8)
NEUTS SEG # BLD: 4.1 K/UL (ref 1.8–8)
NEUTS SEG # BLD: 4.2 K/UL (ref 1.8–8)
NEUTS SEG # BLD: 4.5 K/UL (ref 1.8–8)
NEUTS SEG # BLD: 5.6 K/UL (ref 1.8–8)
NEUTS SEG # BLD: 5.8 K/UL (ref 1.8–8)
NEUTS SEG NFR BLD: 51 % (ref 32–75)
NEUTS SEG NFR BLD: 62 % (ref 32–75)
NEUTS SEG NFR BLD: 62 % (ref 32–75)
NEUTS SEG NFR BLD: 64 % (ref 32–75)
NEUTS SEG NFR BLD: 69 % (ref 32–75)
NEUTS SEG NFR BLD: 81 % (ref 32–75)
NITRITE UR QL STRIP.AUTO: NEGATIVE
NITRITE UR QL STRIP.AUTO: NEGATIVE
NRBC # BLD: 0 K/UL (ref 0–0.01)
NRBC # BLD: 0.03 K/UL (ref 0–0.01)
NRBC # BLD: 0.04 K/UL (ref 0–0.01)
NRBC # BLD: 0.05 K/UL (ref 0–0.01)
NRBC # BLD: 0.07 K/UL (ref 0–0.01)
NRBC # BLD: 0.1 K/UL (ref 0–0.01)
NRBC BLD-RTO: 0 PER 100 WBC
NRBC BLD-RTO: 0.5 PER 100 WBC
NRBC BLD-RTO: 0.6 PER 100 WBC
NRBC BLD-RTO: 0.8 PER 100 WBC
NRBC BLD-RTO: 0.8 PER 100 WBC
NRBC BLD-RTO: 1.9 PER 100 WBC
NUC CELL # FLD: 1 /CU MM
NUC CELL # FLD: 4 /CU MM
P AERUGINOSA DNA BLD POS NAA+NON-PROBE: NOT DETECTED
PCO2 BLD: 23.9 MMHG (ref 35–45)
PCO2 BLD: 31.3 MMHG (ref 35–45)
PCO2 BLD: 34.3 MMHG (ref 35–45)
PCO2 BLD: 34.4 MMHG (ref 35–45)
PCO2 BLD: 36.8 MMHG (ref 35–45)
PCO2 BLD: 40 MMHG (ref 35–45)
PH BLD: 7.03 (ref 7.35–7.45)
PH BLD: 7.04 (ref 7.35–7.45)
PH BLD: 7.08 (ref 7.35–7.45)
PH BLD: 7.13 (ref 7.35–7.45)
PH BLD: 7.26 (ref 7.35–7.45)
PH BLD: 7.4 (ref 7.35–7.45)
PH UR STRIP: 5.5 (ref 5–8)
PH UR STRIP: 8.5 (ref 5–8)
PHOSPHATE SERPL-MCNC: 1.8 MG/DL (ref 2.6–4.7)
PHOSPHATE SERPL-MCNC: 2.1 MG/DL (ref 2.6–4.7)
PHOSPHATE SERPL-MCNC: 2.4 MG/DL (ref 2.6–4.7)
PHOSPHATE SERPL-MCNC: 2.8 MG/DL (ref 2.6–4.7)
PHOSPHATE SERPL-MCNC: 5.1 MG/DL (ref 2.6–4.7)
PLATELET # BLD AUTO: 10 K/UL (ref 150–400)
PLATELET # BLD AUTO: 108 K/UL (ref 150–400)
PLATELET # BLD AUTO: 110 K/UL (ref 150–400)
PLATELET # BLD AUTO: 133 K/UL (ref 150–400)
PLATELET # BLD AUTO: 174 K/UL (ref 150–400)
PLATELET # BLD AUTO: 177 K/UL (ref 150–400)
PLATELET # BLD AUTO: 28 K/UL (ref 150–400)
PLATELET # BLD AUTO: 4 K/UL (ref 150–400)
PLATELET # BLD AUTO: 68 K/UL (ref 150–400)
PMV BLD AUTO: 10.1 FL (ref 8.9–12.9)
PMV BLD AUTO: 10.7 FL (ref 8.9–12.9)
PMV BLD AUTO: 11.7 FL (ref 8.9–12.9)
PMV BLD AUTO: 12.1 FL (ref 8.9–12.9)
PMV BLD AUTO: 9.8 FL (ref 8.9–12.9)
PMV BLD AUTO: 9.9 FL (ref 8.9–12.9)
PMV BLD AUTO: 9.9 FL (ref 8.9–12.9)
PO2 BLD: 222 MMHG (ref 80–100)
PO2 BLD: 36 MMHG (ref 80–100)
PO2 BLD: 63 MMHG (ref 80–100)
PO2 BLD: 68 MMHG (ref 80–100)
PO2 BLD: 68 MMHG (ref 80–100)
PO2 BLD: 69 MMHG (ref 80–100)
POTASSIUM SERPL-SCNC: 3.2 MMOL/L (ref 3.5–5.1)
POTASSIUM SERPL-SCNC: 3.4 MMOL/L (ref 3.5–5.1)
POTASSIUM SERPL-SCNC: 3.6 MMOL/L (ref 3.5–5.1)
POTASSIUM SERPL-SCNC: 4 MMOL/L (ref 3.5–5.1)
POTASSIUM SERPL-SCNC: 4 MMOL/L (ref 3.5–5.1)
POTASSIUM SERPL-SCNC: 4.1 MMOL/L (ref 3.5–5.1)
POTASSIUM SERPL-SCNC: 4.1 MMOL/L (ref 3.5–5.1)
POTASSIUM SERPL-SCNC: 4.2 MMOL/L (ref 3.5–5.1)
POTASSIUM SERPL-SCNC: 4.3 MMOL/L (ref 3.5–5.1)
POTASSIUM SERPL-SCNC: 4.4 MMOL/L (ref 3.5–5.1)
POTASSIUM SERPL-SCNC: 5.2 MMOL/L (ref 3.5–5.1)
POTASSIUM SERPL-SCNC: 5.9 MMOL/L (ref 3.5–5.1)
PROCALCITONIN SERPL-MCNC: 37.17 NG/ML
PROCALCITONIN SERPL-MCNC: 52.38 NG/ML
PROMYELOCYTES NFR BLD MANUAL: 2 %
PROT SERPL-MCNC: 4.2 G/DL (ref 6.4–8.2)
PROT SERPL-MCNC: 4.4 G/DL (ref 6.4–8.2)
PROT SERPL-MCNC: 4.7 G/DL (ref 6.4–8.2)
PROT SERPL-MCNC: 4.9 G/DL (ref 6.4–8.2)
PROT SERPL-MCNC: 5.4 G/DL (ref 6.4–8.2)
PROT SERPL-MCNC: 7.4 G/DL (ref 6.4–8.2)
PROT UR STRIP-MCNC: 100 MG/DL
PROT UR STRIP-MCNC: 300 MG/DL
PROTEUS SP DNA BLD POS QL NAA+NON-PROBE: NOT DETECTED
PROTHROMBIN TIME: 14.2 SEC (ref 9–11.1)
RBC # BLD AUTO: 2.43 M/UL (ref 3.8–5.2)
RBC # BLD AUTO: 2.48 M/UL (ref 3.8–5.2)
RBC # BLD AUTO: 2.53 M/UL (ref 3.8–5.2)
RBC # BLD AUTO: 2.57 M/UL (ref 3.8–5.2)
RBC # BLD AUTO: 2.99 M/UL (ref 3.8–5.2)
RBC # BLD AUTO: 3.07 M/UL (ref 3.8–5.2)
RBC # BLD AUTO: 3.34 M/UL (ref 3.8–5.2)
RBC # BLD AUTO: 3.37 M/UL (ref 3.8–5.2)
RBC # BLD AUTO: 3.76 M/UL (ref 3.8–5.2)
RBC # FLD: 0 /CU MM
RBC # FLD: 0 /CU MM
RBC #/AREA URNS HPF: ABNORMAL /HPF (ref 0–5)
RBC #/AREA URNS HPF: ABNORMAL /HPF (ref 0–5)
RBC MORPH BLD: ABNORMAL
RESISTANT GENE NDM BY PCR: NOT DETECTED
RESISTANT GENE TARGETS: ABNORMAL
S AUREUS DNA BLD POS QL NAA+NON-PROBE: NOT DETECTED
S AUREUS+CONS DNA BLD POS NAA+NON-PROBE: NOT DETECTED
S EPIDERMIDIS DNA BLD POS QL NAA+NON-PRB: NOT DETECTED
S LUGDUNENSIS DNA BLD POS QL NAA+NON-PRB: NOT DETECTED
S MALTOPHILIA DNA BLD POS QL NAA+NON-PRB: NOT DETECTED
S MARCESCENS DNA BLD POS NAA+NON-PROBE: NOT DETECTED
S PNEUM DNA BLD POS QL NAA+NON-PROBE: NOT DETECTED
S PYO DNA BLD POS QL NAA+NON-PROBE: NOT DETECTED
SALMONELLA DNA BLD POS QL NAA+NON-PROBE: NOT DETECTED
SAO2 % BLD: 71.4 % (ref 92–97)
SAO2 % BLD: 82.7 % (ref 92–97)
SAO2 % BLD: 84.6 % (ref 92–97)
SAO2 % BLD: 86.9 % (ref 92–97)
SAO2 % BLD: 88.6 % (ref 92–97)
SAO2 % BLD: 99.4 % (ref 92–97)
SERVICE CMNT-IMP: ABNORMAL
SERVICE CMNT-IMP: NORMAL
SODIUM SERPL-SCNC: 132 MMOL/L (ref 136–145)
SODIUM SERPL-SCNC: 134 MMOL/L (ref 136–145)
SODIUM SERPL-SCNC: 136 MMOL/L (ref 136–145)
SODIUM SERPL-SCNC: 137 MMOL/L (ref 136–145)
SODIUM SERPL-SCNC: 138 MMOL/L (ref 136–145)
SODIUM SERPL-SCNC: 139 MMOL/L (ref 136–145)
SODIUM SERPL-SCNC: 140 MMOL/L (ref 136–145)
SODIUM SERPL-SCNC: 140 MMOL/L (ref 136–145)
SODIUM SERPL-SCNC: 144 MMOL/L (ref 136–145)
SODIUM SERPL-SCNC: 146 MMOL/L (ref 136–145)
SP GR UR REFRACTOMETRY: 1.02 (ref 1–1.03)
SP GR UR REFRACTOMETRY: 1.02 (ref 1–1.03)
SPECIMEN EXP DATE BLD: NORMAL
SPECIMEN HOLD: NORMAL
SPECIMEN SOURCE FLD: NORMAL
SPECIMEN SOURCE FLD: NORMAL
SPECIMEN TYPE: ABNORMAL
STREPTOCOCCUS DNA BLD POS NAA+NON-PROBE: NOT DETECTED
THERAPEUTIC RANGE: ABNORMAL SECS (ref 58–77)
TSH SERPL DL<=0.05 MIU/L-ACNC: 1.66 UIU/ML (ref 0.36–3.74)
UNIT DIVISION: 0
UNIT DIVISION: 0
UNIT ISSUE DATE/TIME: NORMAL
UNIT ISSUE DATE/TIME: NORMAL
URATE CRY URNS QL MICRO: ABNORMAL
URINE CULTURE IF INDICATED: ABNORMAL
UROBILINOGEN UR QL STRIP.AUTO: 0.2 EU/DL (ref 0.2–1)
UROBILINOGEN UR QL STRIP.AUTO: 0.2 EU/DL (ref 0.2–1)
VANCOMYCIN SERPL-MCNC: 10.6 UG/ML
WBC # BLD AUTO: 10.1 K/UL (ref 3.6–11)
WBC # BLD AUTO: 12.3 K/UL (ref 3.6–11)
WBC # BLD AUTO: 3.8 K/UL (ref 3.6–11)
WBC # BLD AUTO: 4.9 K/UL (ref 3.6–11)
WBC # BLD AUTO: 5 K/UL (ref 3.6–11)
WBC # BLD AUTO: 6.3 K/UL (ref 3.6–11)
WBC # BLD AUTO: 6.8 K/UL (ref 3.6–11)
WBC # BLD AUTO: 7.1 K/UL (ref 3.6–11)
WBC # BLD AUTO: 8.2 K/UL (ref 3.6–11)
WBC MORPH BLD: ABNORMAL
WBC URNS QL MICRO: >100 /HPF (ref 0–4)
WBC URNS QL MICRO: ABNORMAL /HPF (ref 0–4)

## 2024-01-01 PROCEDURE — 74176 CT ABD & PELVIS W/O CONTRAST: CPT

## 2024-01-01 PROCEDURE — 6360000002 HC RX W HCPCS: Performed by: NURSE PRACTITIONER

## 2024-01-01 PROCEDURE — 37799 UNLISTED PX VASCULAR SURGERY: CPT

## 2024-01-01 PROCEDURE — 36415 COLL VENOUS BLD VENIPUNCTURE: CPT

## 2024-01-01 PROCEDURE — 6360000002 HC RX W HCPCS: Performed by: ANESTHESIOLOGY

## 2024-01-01 PROCEDURE — 51701 INSERT BLADDER CATHETER: CPT

## 2024-01-01 PROCEDURE — 94640 AIRWAY INHALATION TREATMENT: CPT

## 2024-01-01 PROCEDURE — 6370000000 HC RX 637 (ALT 250 FOR IP): Performed by: NURSE PRACTITIONER

## 2024-01-01 PROCEDURE — A4722 DIALYS SOL FLD VOL > 1999CC: HCPCS | Performed by: NURSE PRACTITIONER

## 2024-01-01 PROCEDURE — 6360000002 HC RX W HCPCS

## 2024-01-01 PROCEDURE — 97530 THERAPEUTIC ACTIVITIES: CPT

## 2024-01-01 PROCEDURE — 90945 DIALYSIS ONE EVALUATION: CPT

## 2024-01-01 PROCEDURE — C9113 INJ PANTOPRAZOLE SODIUM, VIA: HCPCS | Performed by: NURSE PRACTITIONER

## 2024-01-01 PROCEDURE — 6370000000 HC RX 637 (ALT 250 FOR IP): Performed by: INTERNAL MEDICINE

## 2024-01-01 PROCEDURE — 99223 1ST HOSP IP/OBS HIGH 75: CPT | Performed by: PHYSICAL MEDICINE & REHABILITATION

## 2024-01-01 PROCEDURE — 2580000003 HC RX 258: Performed by: NURSE PRACTITIONER

## 2024-01-01 PROCEDURE — 84100 ASSAY OF PHOSPHORUS: CPT

## 2024-01-01 PROCEDURE — 82803 BLOOD GASES ANY COMBINATION: CPT

## 2024-01-01 PROCEDURE — 82962 GLUCOSE BLOOD TEST: CPT

## 2024-01-01 PROCEDURE — P9045 ALBUMIN (HUMAN), 5%, 250 ML: HCPCS | Performed by: INTERNAL MEDICINE

## 2024-01-01 PROCEDURE — 2580000003 HC RX 258: Performed by: INTERNAL MEDICINE

## 2024-01-01 PROCEDURE — 2500000003 HC RX 250 WO HCPCS: Performed by: INTERNAL MEDICINE

## 2024-01-01 PROCEDURE — 6360000002 HC RX W HCPCS: Performed by: INTERNAL MEDICINE

## 2024-01-01 PROCEDURE — 83735 ASSAY OF MAGNESIUM: CPT

## 2024-01-01 PROCEDURE — 87040 BLOOD CULTURE FOR BACTERIA: CPT

## 2024-01-01 PROCEDURE — 86901 BLOOD TYPING SEROLOGIC RH(D): CPT

## 2024-01-01 PROCEDURE — 36556 INSERT NON-TUNNEL CV CATH: CPT

## 2024-01-01 PROCEDURE — 2500000003 HC RX 250 WO HCPCS: Performed by: ANESTHESIOLOGY

## 2024-01-01 PROCEDURE — 85025 COMPLETE CBC W/AUTO DIFF WBC: CPT

## 2024-01-01 PROCEDURE — 82330 ASSAY OF CALCIUM: CPT

## 2024-01-01 PROCEDURE — 80048 BASIC METABOLIC PNL TOTAL CA: CPT

## 2024-01-01 PROCEDURE — 81001 URINALYSIS AUTO W/SCOPE: CPT

## 2024-01-01 PROCEDURE — 94003 VENT MGMT INPAT SUBQ DAY: CPT

## 2024-01-01 PROCEDURE — 2500000003 HC RX 250 WO HCPCS

## 2024-01-01 PROCEDURE — 94760 N-INVAS EAR/PLS OXIMETRY 1: CPT

## 2024-01-01 PROCEDURE — 86900 BLOOD TYPING SEROLOGIC ABO: CPT

## 2024-01-01 PROCEDURE — 83605 ASSAY OF LACTIC ACID: CPT

## 2024-01-01 PROCEDURE — 02HV33Z INSERTION OF INFUSION DEVICE INTO SUPERIOR VENA CAVA, PERCUTANEOUS APPROACH: ICD-10-PCS | Performed by: ANESTHESIOLOGY

## 2024-01-01 PROCEDURE — 2500000003 HC RX 250 WO HCPCS: Performed by: NURSE PRACTITIONER

## 2024-01-01 PROCEDURE — 87070 CULTURE OTHR SPECIMN AEROBIC: CPT

## 2024-01-01 PROCEDURE — 87186 SC STD MICRODIL/AGAR DIL: CPT

## 2024-01-01 PROCEDURE — 2580000003 HC RX 258

## 2024-01-01 PROCEDURE — 97161 PT EVAL LOW COMPLEX 20 MIN: CPT

## 2024-01-01 PROCEDURE — 97110 THERAPEUTIC EXERCISES: CPT

## 2024-01-01 PROCEDURE — P9016 RBC LEUKOCYTES REDUCED: HCPCS

## 2024-01-01 PROCEDURE — 31500 INSERT EMERGENCY AIRWAY: CPT

## 2024-01-01 PROCEDURE — 04HY32Z INSERTION OF MONITORING DEVICE INTO LOWER ARTERY, PERCUTANEOUS APPROACH: ICD-10-PCS | Performed by: STUDENT IN AN ORGANIZED HEALTH CARE EDUCATION/TRAINING PROGRAM

## 2024-01-01 PROCEDURE — 2000000000 HC ICU R&B

## 2024-01-01 PROCEDURE — 51798 US URINE CAPACITY MEASURE: CPT

## 2024-01-01 PROCEDURE — 85610 PROTHROMBIN TIME: CPT

## 2024-01-01 PROCEDURE — 87154 CUL TYP ID BLD PTHGN 6+ TRGT: CPT

## 2024-01-01 PROCEDURE — 2580000003 HC RX 258: Performed by: EMERGENCY MEDICINE

## 2024-01-01 PROCEDURE — 87086 URINE CULTURE/COLONY COUNT: CPT

## 2024-01-01 PROCEDURE — 36620 INSERTION CATHETER ARTERY: CPT

## 2024-01-01 PROCEDURE — P9047 ALBUMIN (HUMAN), 25%, 50ML: HCPCS | Performed by: INTERNAL MEDICINE

## 2024-01-01 PROCEDURE — 72100 X-RAY EXAM L-S SPINE 2/3 VWS: CPT

## 2024-01-01 PROCEDURE — 2060000000 HC ICU INTERMEDIATE R&B

## 2024-01-01 PROCEDURE — 6370000000 HC RX 637 (ALT 250 FOR IP)

## 2024-01-01 PROCEDURE — 85027 COMPLETE CBC AUTOMATED: CPT

## 2024-01-01 PROCEDURE — 86706 HEP B SURFACE ANTIBODY: CPT

## 2024-01-01 PROCEDURE — 94002 VENT MGMT INPAT INIT DAY: CPT

## 2024-01-01 PROCEDURE — 71045 X-RAY EXAM CHEST 1 VIEW: CPT

## 2024-01-01 PROCEDURE — 85730 THROMBOPLASTIN TIME PARTIAL: CPT

## 2024-01-01 PROCEDURE — 84145 PROCALCITONIN (PCT): CPT

## 2024-01-01 PROCEDURE — A4216 STERILE WATER/SALINE, 10 ML: HCPCS | Performed by: NURSE PRACTITIONER

## 2024-01-01 PROCEDURE — 85018 HEMOGLOBIN: CPT

## 2024-01-01 PROCEDURE — 82140 ASSAY OF AMMONIA: CPT

## 2024-01-01 PROCEDURE — 93005 ELECTROCARDIOGRAM TRACING: CPT | Performed by: EMERGENCY MEDICINE

## 2024-01-01 PROCEDURE — 86923 COMPATIBILITY TEST ELECTRIC: CPT

## 2024-01-01 PROCEDURE — 36430 TRANSFUSION BLD/BLD COMPNT: CPT

## 2024-01-01 PROCEDURE — 5A1945Z RESPIRATORY VENTILATION, 24-96 CONSECUTIVE HOURS: ICD-10-PCS | Performed by: ANESTHESIOLOGY

## 2024-01-01 PROCEDURE — 86850 RBC ANTIBODY SCREEN: CPT

## 2024-01-01 PROCEDURE — 87205 SMEAR GRAM STAIN: CPT

## 2024-01-01 PROCEDURE — 93306 TTE W/DOPPLER COMPLETE: CPT

## 2024-01-01 PROCEDURE — 89050 BODY FLUID CELL COUNT: CPT

## 2024-01-01 PROCEDURE — 6360000004 HC RX CONTRAST MEDICATION: Performed by: RADIOLOGY

## 2024-01-01 PROCEDURE — 99285 EMERGENCY DEPT VISIT HI MDM: CPT

## 2024-01-01 PROCEDURE — 72072 X-RAY EXAM THORAC SPINE 3VWS: CPT

## 2024-01-01 PROCEDURE — 74177 CT ABD & PELVIS W/CONTRAST: CPT

## 2024-01-01 PROCEDURE — 80202 ASSAY OF VANCOMYCIN: CPT

## 2024-01-01 PROCEDURE — 80053 COMPREHEN METABOLIC PANEL: CPT

## 2024-01-01 PROCEDURE — 84443 ASSAY THYROID STIM HORMONE: CPT

## 2024-01-01 PROCEDURE — 87088 URINE BACTERIA CULTURE: CPT

## 2024-01-01 PROCEDURE — 87077 CULTURE AEROBIC IDENTIFY: CPT

## 2024-01-01 PROCEDURE — 85014 HEMATOCRIT: CPT

## 2024-01-01 PROCEDURE — 74018 RADEX ABDOMEN 1 VIEW: CPT

## 2024-01-01 PROCEDURE — 70450 CT HEAD/BRAIN W/O DYE: CPT

## 2024-01-01 PROCEDURE — 94761 N-INVAS EAR/PLS OXIMETRY MLT: CPT

## 2024-01-01 PROCEDURE — P9047 ALBUMIN (HUMAN), 25%, 50ML: HCPCS

## 2024-01-01 PROCEDURE — 30233N1 TRANSFUSION OF NONAUTOLOGOUS RED BLOOD CELLS INTO PERIPHERAL VEIN, PERCUTANEOUS APPROACH: ICD-10-PCS | Performed by: ANESTHESIOLOGY

## 2024-01-01 PROCEDURE — 6370000000 HC RX 637 (ALT 250 FOR IP): Performed by: EMERGENCY MEDICINE

## 2024-01-01 PROCEDURE — 71250 CT THORAX DX C-: CPT

## 2024-01-01 PROCEDURE — 5A1D70Z PERFORMANCE OF URINARY FILTRATION, INTERMITTENT, LESS THAN 6 HOURS PER DAY: ICD-10-PCS | Performed by: ANESTHESIOLOGY

## 2024-01-01 PROCEDURE — 87340 HEPATITIS B SURFACE AG IA: CPT

## 2024-01-01 PROCEDURE — 0BH17EZ INSERTION OF ENDOTRACHEAL AIRWAY INTO TRACHEA, VIA NATURAL OR ARTIFICIAL OPENING: ICD-10-PCS | Performed by: ANESTHESIOLOGY

## 2024-01-01 PROCEDURE — 83036 HEMOGLOBIN GLYCOSYLATED A1C: CPT

## 2024-01-01 PROCEDURE — 87015 SPECIMEN INFECT AGNT CONCNTJ: CPT

## 2024-01-01 PROCEDURE — 97535 SELF CARE MNGMENT TRAINING: CPT

## 2024-01-01 PROCEDURE — 97165 OT EVAL LOW COMPLEX 30 MIN: CPT

## 2024-01-01 PROCEDURE — 93005 ELECTROCARDIOGRAM TRACING: CPT | Performed by: NURSE PRACTITIONER

## 2024-01-01 RX ORDER — ALBUMIN, HUMAN INJ 5% 5 %
12.5 SOLUTION INTRAVENOUS ONCE
Status: COMPLETED | OUTPATIENT
Start: 2024-01-01 | End: 2024-01-01

## 2024-01-01 RX ORDER — METRONIDAZOLE 500 MG/100ML
500 INJECTION, SOLUTION INTRAVENOUS EVERY 8 HOURS
Status: DISCONTINUED | OUTPATIENT
Start: 2024-01-01 | End: 2024-01-01

## 2024-01-01 RX ORDER — SODIUM CHLORIDE 9 MG/ML
INJECTION, SOLUTION INTRAVENOUS PRN
Status: DISCONTINUED | OUTPATIENT
Start: 2024-01-01 | End: 2024-04-26 | Stop reason: HOSPADM

## 2024-01-01 RX ORDER — LANOLIN ALCOHOL/MO/W.PET/CERES
3 CREAM (GRAM) TOPICAL NIGHTLY PRN
Status: DISCONTINUED | OUTPATIENT
Start: 2024-01-01 | End: 2024-04-26 | Stop reason: HOSPADM

## 2024-01-01 RX ORDER — ENOXAPARIN SODIUM 100 MG/ML
40 INJECTION SUBCUTANEOUS DAILY
Status: DISCONTINUED | OUTPATIENT
Start: 2024-01-01 | End: 2024-01-01

## 2024-01-01 RX ORDER — ROCURONIUM BROMIDE 10 MG/ML
1 INJECTION, SOLUTION INTRAVENOUS ONCE
Status: DISCONTINUED | OUTPATIENT
Start: 2024-01-01 | End: 2024-04-26 | Stop reason: HOSPADM

## 2024-01-01 RX ORDER — SODIUM CHLORIDE, SODIUM LACTATE, CALCIUM CHLORIDE, MAGNESIUM CHLORIDE AND DEXTROSE 2.5; 538; 448; 18.3; 5.08 G/100ML; MG/100ML; MG/100ML; MG/100ML; MG/100ML
6000 INJECTION, SOLUTION INTRAPERITONEAL DAILY
Status: DISCONTINUED | OUTPATIENT
Start: 2024-01-01 | End: 2024-01-01 | Stop reason: SDUPTHER

## 2024-01-01 RX ORDER — SODIUM CHLORIDE, SODIUM LACTATE, CALCIUM CHLORIDE, MAGNESIUM CHLORIDE AND DEXTROSE 1.5; 538; 448; 18.3; 5.08 G/100ML; MG/100ML; MG/100ML; MG/100ML; MG/100ML
6000 INJECTION, SOLUTION INTRAPERITONEAL DAILY
Status: DISCONTINUED | OUTPATIENT
Start: 2024-01-01 | End: 2024-01-01

## 2024-01-01 RX ORDER — FENTANYL CITRATE-0.9 % NACL/PF 10 MCG/ML
25-200 PLASTIC BAG, INJECTION (ML) INTRAVENOUS CONTINUOUS
Status: DISCONTINUED | OUTPATIENT
Start: 2024-01-01 | End: 2024-04-26 | Stop reason: HOSPADM

## 2024-01-01 RX ORDER — ONDANSETRON 2 MG/ML
4 INJECTION INTRAMUSCULAR; INTRAVENOUS EVERY 6 HOURS PRN
Status: DISCONTINUED | OUTPATIENT
Start: 2024-01-01 | End: 2024-04-26 | Stop reason: HOSPADM

## 2024-01-01 RX ORDER — LORAZEPAM 2 MG/ML
2 INJECTION INTRAMUSCULAR EVERY 4 HOURS PRN
Status: DISCONTINUED | OUTPATIENT
Start: 2024-01-01 | End: 2024-04-26 | Stop reason: HOSPADM

## 2024-01-01 RX ORDER — CARVEDILOL 12.5 MG/1
12.5 TABLET ORAL 2 TIMES DAILY WITH MEALS
Status: DISCONTINUED | OUTPATIENT
Start: 2024-01-01 | End: 2024-01-01

## 2024-01-01 RX ORDER — INSULIN LISPRO 100 [IU]/ML
0-4 INJECTION, SOLUTION INTRAVENOUS; SUBCUTANEOUS EVERY 4 HOURS
Status: DISCONTINUED | OUTPATIENT
Start: 2024-01-01 | End: 2024-01-01

## 2024-01-01 RX ORDER — MIDODRINE HYDROCHLORIDE 5 MG/1
10 TABLET ORAL
Status: DISCONTINUED | OUTPATIENT
Start: 2024-01-01 | End: 2024-01-01

## 2024-01-01 RX ORDER — DEXTROSE MONOHYDRATE 100 MG/ML
INJECTION, SOLUTION INTRAVENOUS CONTINUOUS
Status: DISCONTINUED | OUTPATIENT
Start: 2024-01-01 | End: 2024-04-26 | Stop reason: HOSPADM

## 2024-01-01 RX ORDER — CALCIUM GLUCONATE 20 MG/ML
1000 INJECTION, SOLUTION INTRAVENOUS ONCE
Status: COMPLETED | OUTPATIENT
Start: 2024-01-01 | End: 2024-01-01

## 2024-01-01 RX ORDER — SODIUM CHLORIDE 9 MG/ML
INJECTION, SOLUTION INTRAVENOUS CONTINUOUS
Status: DISCONTINUED | OUTPATIENT
Start: 2024-01-01 | End: 2024-01-01

## 2024-01-01 RX ORDER — ALBUMIN, HUMAN INJ 5% 5 %
25 SOLUTION INTRAVENOUS ONCE
Status: DISCONTINUED | OUTPATIENT
Start: 2024-01-01 | End: 2024-01-01

## 2024-01-01 RX ORDER — HEPARIN SODIUM 1000 [USP'U]/ML
INJECTION, SOLUTION INTRAVENOUS; SUBCUTANEOUS PRN
Status: DISCONTINUED | OUTPATIENT
Start: 2024-01-01 | End: 2024-04-26 | Stop reason: HOSPADM

## 2024-01-01 RX ORDER — POTASSIUM CHLORIDE 1.5 G/1.58G
40 POWDER, FOR SOLUTION ORAL DAILY
COMMUNITY

## 2024-01-01 RX ORDER — SODIUM CHLORIDE 0.9 % (FLUSH) 0.9 %
5-40 SYRINGE (ML) INJECTION EVERY 12 HOURS SCHEDULED
Status: DISCONTINUED | OUTPATIENT
Start: 2024-01-01 | End: 2024-04-26 | Stop reason: HOSPADM

## 2024-01-01 RX ORDER — ACETAMINOPHEN 500 MG
1000 TABLET ORAL
Status: COMPLETED | OUTPATIENT
Start: 2024-01-01 | End: 2024-01-01

## 2024-01-01 RX ORDER — SODIUM CHLORIDE, SODIUM LACTATE, POTASSIUM CHLORIDE, AND CALCIUM CHLORIDE .6; .31; .03; .02 G/100ML; G/100ML; G/100ML; G/100ML
500 INJECTION, SOLUTION INTRAVENOUS ONCE
Status: COMPLETED | OUTPATIENT
Start: 2024-01-01 | End: 2024-01-01

## 2024-01-01 RX ORDER — ACETAMINOPHEN 325 MG/1
650 TABLET ORAL EVERY 6 HOURS PRN
Status: DISCONTINUED | OUTPATIENT
Start: 2024-01-01 | End: 2024-04-26 | Stop reason: HOSPADM

## 2024-01-01 RX ORDER — HEPARIN SODIUM 5000 [USP'U]/ML
5000 INJECTION, SOLUTION INTRAVENOUS; SUBCUTANEOUS EVERY 8 HOURS SCHEDULED
Status: DISCONTINUED | OUTPATIENT
Start: 2024-01-01 | End: 2024-04-26 | Stop reason: HOSPADM

## 2024-01-01 RX ORDER — 0.9 % SODIUM CHLORIDE 0.9 %
1000 INTRAVENOUS SOLUTION INTRAVENOUS ONCE
Status: COMPLETED | OUTPATIENT
Start: 2024-01-01 | End: 2024-01-01

## 2024-01-01 RX ORDER — SODIUM CHLORIDE, SODIUM LACTATE, CALCIUM CHLORIDE, MAGNESIUM CHLORIDE AND DEXTROSE 1.5; 538; 448; 18.3; 5.08 G/100ML; MG/100ML; MG/100ML; MG/100ML; MG/100ML
6000 INJECTION, SOLUTION INTRAPERITONEAL
Status: DISCONTINUED | OUTPATIENT
Start: 2024-01-01 | End: 2024-04-26 | Stop reason: HOSPADM

## 2024-01-01 RX ORDER — FENTANYL CITRATE 50 UG/ML
50 INJECTION, SOLUTION INTRAMUSCULAR; INTRAVENOUS ONCE
Status: COMPLETED | OUTPATIENT
Start: 2024-01-01 | End: 2024-01-01

## 2024-01-01 RX ORDER — SODIUM CHLORIDE 9 MG/ML
INJECTION, SOLUTION INTRAVENOUS CONTINUOUS
Status: DISPENSED | OUTPATIENT
Start: 2024-01-01 | End: 2024-01-01

## 2024-01-01 RX ORDER — ALBUMIN (HUMAN) 12.5 G/50ML
25 SOLUTION INTRAVENOUS ONCE
Status: COMPLETED | OUTPATIENT
Start: 2024-01-01 | End: 2024-01-01

## 2024-01-01 RX ORDER — ROCURONIUM BROMIDE 10 MG/ML
100 INJECTION, SOLUTION INTRAVENOUS ONCE
Status: COMPLETED | OUTPATIENT
Start: 2024-01-01 | End: 2024-01-01

## 2024-01-01 RX ORDER — MIDODRINE HYDROCHLORIDE 5 MG/1
5 TABLET ORAL
Status: DISCONTINUED | OUTPATIENT
Start: 2024-01-01 | End: 2024-01-01

## 2024-01-01 RX ORDER — INSULIN LISPRO 100 [IU]/ML
0-4 INJECTION, SOLUTION INTRAVENOUS; SUBCUTANEOUS NIGHTLY
Status: DISCONTINUED | OUTPATIENT
Start: 2024-01-01 | End: 2024-01-01

## 2024-01-01 RX ORDER — MIDAZOLAM HYDROCHLORIDE 1 MG/ML
INJECTION INTRAMUSCULAR; INTRAVENOUS
Status: COMPLETED
Start: 2024-01-01 | End: 2024-01-01

## 2024-01-01 RX ORDER — PROPOFOL 10 MG/ML
5-50 INJECTION, EMULSION INTRAVENOUS CONTINUOUS
Status: DISCONTINUED | OUTPATIENT
Start: 2024-01-01 | End: 2024-04-26 | Stop reason: HOSPADM

## 2024-01-01 RX ORDER — NOREPINEPHRINE BITARTRATE 0.06 MG/ML
1-100 INJECTION, SOLUTION INTRAVENOUS CONTINUOUS
Status: DISCONTINUED | OUTPATIENT
Start: 2024-01-01 | End: 2024-01-01 | Stop reason: CLARIF

## 2024-01-01 RX ORDER — EPINEPHRINE 0.1 MG/ML
0.1 INJECTION INTRAVENOUS ONCE
Status: DISCONTINUED | OUTPATIENT
Start: 2024-01-01 | End: 2024-01-01

## 2024-01-01 RX ORDER — NOREPINEPHRINE BITARTRATE 0.06 MG/ML
1-30 INJECTION, SOLUTION INTRAVENOUS CONTINUOUS
Status: DISCONTINUED | OUTPATIENT
Start: 2024-01-01 | End: 2024-01-01

## 2024-01-01 RX ORDER — SODIUM CHLORIDE, SODIUM LACTATE, CALCIUM CHLORIDE, MAGNESIUM CHLORIDE AND DEXTROSE 2.5; 538; 448; 18.3; 5.08 G/100ML; MG/100ML; MG/100ML; MG/100ML; MG/100ML
6000 INJECTION, SOLUTION INTRAPERITONEAL DAILY
Status: DISCONTINUED | OUTPATIENT
Start: 2024-01-01 | End: 2024-01-01

## 2024-01-01 RX ORDER — GENTAMICIN SULFATE 1 MG/G
CREAM TOPICAL PRN
Status: DISCONTINUED | OUTPATIENT
Start: 2024-01-01 | End: 2024-04-26 | Stop reason: HOSPADM

## 2024-01-01 RX ORDER — DEXTROSE MONOHYDRATE 25 G/50ML
25 INJECTION, SOLUTION INTRAVENOUS PRN
Status: DISCONTINUED | OUTPATIENT
Start: 2024-01-01 | End: 2024-04-26 | Stop reason: HOSPADM

## 2024-01-01 RX ORDER — ALBUMIN (HUMAN) 12.5 G/50ML
25 SOLUTION INTRAVENOUS EVERY 6 HOURS
Status: DISCONTINUED | OUTPATIENT
Start: 2024-01-01 | End: 2024-01-01

## 2024-01-01 RX ORDER — PROCHLORPERAZINE EDISYLATE 5 MG/ML
5 INJECTION INTRAMUSCULAR; INTRAVENOUS ONCE
Status: COMPLETED | OUTPATIENT
Start: 2024-01-01 | End: 2024-01-01

## 2024-01-01 RX ORDER — CARVEDILOL 3.12 MG/1
3.12 TABLET ORAL 2 TIMES DAILY WITH MEALS
Status: DISCONTINUED | OUTPATIENT
Start: 2024-01-01 | End: 2024-04-26 | Stop reason: HOSPADM

## 2024-01-01 RX ORDER — SODIUM CHLORIDE, SODIUM LACTATE, POTASSIUM CHLORIDE, AND CALCIUM CHLORIDE .6; .31; .03; .02 G/100ML; G/100ML; G/100ML; G/100ML
1000 INJECTION, SOLUTION INTRAVENOUS ONCE
Status: COMPLETED | OUTPATIENT
Start: 2024-01-01 | End: 2024-01-01

## 2024-01-01 RX ORDER — POLYETHYLENE GLYCOL 3350 17 G/17G
17 POWDER, FOR SOLUTION ORAL DAILY PRN
Status: DISCONTINUED | OUTPATIENT
Start: 2024-01-01 | End: 2024-04-26 | Stop reason: HOSPADM

## 2024-01-01 RX ORDER — ALBUMIN, HUMAN INJ 5% 5 %
25 SOLUTION INTRAVENOUS ONCE
Status: COMPLETED | OUTPATIENT
Start: 2024-01-01 | End: 2024-01-01

## 2024-01-01 RX ORDER — MAGNESIUM SULFATE IN WATER 40 MG/ML
4000 INJECTION, SOLUTION INTRAVENOUS ONCE
Status: COMPLETED | OUTPATIENT
Start: 2024-01-01 | End: 2024-01-01

## 2024-01-01 RX ORDER — MIDAZOLAM HYDROCHLORIDE 2 MG/2ML
2 INJECTION, SOLUTION INTRAMUSCULAR; INTRAVENOUS EVERY 30 MIN PRN
Status: DISCONTINUED | OUTPATIENT
Start: 2024-01-01 | End: 2024-04-26 | Stop reason: HOSPADM

## 2024-01-01 RX ORDER — CARVEDILOL 3.12 MG/1
3.12 TABLET ORAL 2 TIMES DAILY WITH MEALS
Status: DISCONTINUED | OUTPATIENT
Start: 2024-01-01 | End: 2024-01-01

## 2024-01-01 RX ORDER — CARVEDILOL 12.5 MG/1
6.25 TABLET ORAL 2 TIMES DAILY WITH MEALS
Status: DISCONTINUED | OUTPATIENT
Start: 2024-01-01 | End: 2024-01-01

## 2024-01-01 RX ORDER — ASPIRIN 81 MG/1
81 TABLET, CHEWABLE ORAL DAILY
Status: DISCONTINUED | OUTPATIENT
Start: 2024-01-01 | End: 2024-04-26 | Stop reason: HOSPADM

## 2024-01-01 RX ORDER — FUROSEMIDE 40 MG/1
40 TABLET ORAL DAILY
COMMUNITY

## 2024-01-01 RX ORDER — MONTELUKAST SODIUM 10 MG/1
10 TABLET ORAL NIGHTLY
Status: DISCONTINUED | OUTPATIENT
Start: 2024-01-01 | End: 2024-04-26 | Stop reason: HOSPADM

## 2024-01-01 RX ORDER — EPINEPHRINE 0.1 MG/ML
0.3 INJECTION INTRAVENOUS ONCE
Status: COMPLETED | OUTPATIENT
Start: 2024-01-01 | End: 2024-01-01

## 2024-01-01 RX ORDER — LIDOCAINE 4 G/G
1 PATCH TOPICAL DAILY
Status: DISCONTINUED | OUTPATIENT
Start: 2024-01-01 | End: 2024-01-01

## 2024-01-01 RX ORDER — MIDODRINE HYDROCHLORIDE 5 MG/1
15 TABLET ORAL
Status: DISCONTINUED | OUTPATIENT
Start: 2024-01-01 | End: 2024-01-01

## 2024-01-01 RX ORDER — DEXTROSE AND SODIUM CHLORIDE 5; .9 G/100ML; G/100ML
INJECTION, SOLUTION INTRAVENOUS CONTINUOUS
Status: DISCONTINUED | OUTPATIENT
Start: 2024-01-01 | End: 2024-01-01

## 2024-01-01 RX ORDER — GENTAMICIN SULFATE 1 MG/G
CREAM TOPICAL DAILY
Status: DISCONTINUED | OUTPATIENT
Start: 2024-01-01 | End: 2024-01-01

## 2024-01-01 RX ORDER — NOREPINEPHRINE BITARTRATE 0.06 MG/ML
INJECTION, SOLUTION INTRAVENOUS
Status: COMPLETED
Start: 2024-01-01 | End: 2024-01-01

## 2024-01-01 RX ORDER — ACETAMINOPHEN 650 MG/1
650 SUPPOSITORY RECTAL EVERY 6 HOURS PRN
Status: DISCONTINUED | OUTPATIENT
Start: 2024-01-01 | End: 2024-04-26 | Stop reason: HOSPADM

## 2024-01-01 RX ORDER — DEXMEDETOMIDINE HYDROCHLORIDE 4 UG/ML
.1-1.5 INJECTION, SOLUTION INTRAVENOUS CONTINUOUS
Status: DISCONTINUED | OUTPATIENT
Start: 2024-01-01 | End: 2024-04-26 | Stop reason: HOSPADM

## 2024-01-01 RX ORDER — 0.9 % SODIUM CHLORIDE 0.9 %
250 INTRAVENOUS SOLUTION INTRAVENOUS ONCE
Status: COMPLETED | OUTPATIENT
Start: 2024-01-01 | End: 2024-01-01

## 2024-01-01 RX ORDER — SODIUM CHLORIDE, SODIUM LACTATE, CALCIUM CHLORIDE, MAGNESIUM CHLORIDE AND DEXTROSE 2.5; 538; 448; 18.3; 5.08 G/100ML; MG/100ML; MG/100ML; MG/100ML; MG/100ML
6000 INJECTION, SOLUTION INTRAPERITONEAL
Status: DISCONTINUED | OUTPATIENT
Start: 2024-01-01 | End: 2024-04-26 | Stop reason: HOSPADM

## 2024-01-01 RX ORDER — DEXTROSE MONOHYDRATE 100 MG/ML
INJECTION, SOLUTION INTRAVENOUS CONTINUOUS PRN
Status: DISCONTINUED | OUTPATIENT
Start: 2024-01-01 | End: 2024-04-26 | Stop reason: HOSPADM

## 2024-01-01 RX ORDER — IBUPROFEN 400 MG/1
400 TABLET ORAL EVERY 12 HOURS PRN
Status: DISCONTINUED | OUTPATIENT
Start: 2024-01-01 | End: 2024-01-01

## 2024-01-01 RX ORDER — 0.9 % SODIUM CHLORIDE 0.9 %
500 INTRAVENOUS SOLUTION INTRAVENOUS ONCE
Status: COMPLETED | OUTPATIENT
Start: 2024-01-01 | End: 2024-01-01

## 2024-01-01 RX ORDER — ONDANSETRON 4 MG/1
4 TABLET, ORALLY DISINTEGRATING ORAL EVERY 8 HOURS PRN
Status: DISCONTINUED | OUTPATIENT
Start: 2024-01-01 | End: 2024-04-26 | Stop reason: HOSPADM

## 2024-01-01 RX ORDER — OXYCODONE HYDROCHLORIDE 5 MG/1
2.5 TABLET ORAL
Status: DISPENSED | OUTPATIENT
Start: 2024-01-01 | End: 2024-01-01

## 2024-01-01 RX ORDER — INSULIN LISPRO 100 [IU]/ML
0-8 INJECTION, SOLUTION INTRAVENOUS; SUBCUTANEOUS
Status: DISCONTINUED | OUTPATIENT
Start: 2024-01-01 | End: 2024-01-01

## 2024-01-01 RX ORDER — CALCIUM CHLORIDE, MAGNESIUM CHLORIDE, DEXTROSE MONOHYDRATE, LACTIC ACID, SODIUM CHLORIDE, SODIUM BICARBONATE AND POTASSIUM CHLORIDE 3.68; 3.05; 22; 5.4; 6.46; 3.09; .314 G/L; G/L; G/L; G/L; G/L; G/L; G/L
INJECTION INTRAVENOUS CONTINUOUS
Status: DISCONTINUED | OUTPATIENT
Start: 2024-01-01 | End: 2024-04-26 | Stop reason: HOSPADM

## 2024-01-01 RX ORDER — ERYTHROMYCIN 5 MG/G
OINTMENT OPHTHALMIC EVERY 6 HOURS SCHEDULED
Status: COMPLETED | OUTPATIENT
Start: 2024-01-01 | End: 2024-01-01

## 2024-01-01 RX ORDER — EPINEPHRINE 0.1 MG/ML
1 INJECTION INTRAVENOUS ONCE
Status: DISCONTINUED | OUTPATIENT
Start: 2024-01-01 | End: 2024-04-26 | Stop reason: HOSPADM

## 2024-01-01 RX ORDER — SODIUM CHLORIDE 0.9 % (FLUSH) 0.9 %
5-40 SYRINGE (ML) INJECTION PRN
Status: DISCONTINUED | OUTPATIENT
Start: 2024-01-01 | End: 2024-04-26 | Stop reason: HOSPADM

## 2024-01-01 RX ORDER — SODIUM CHLORIDE, SODIUM LACTATE, CALCIUM CHLORIDE, MAGNESIUM CHLORIDE AND DEXTROSE 2.5; 538; 448; 18.3; 5.08 G/100ML; MG/100ML; MG/100ML; MG/100ML; MG/100ML
6000 INJECTION, SOLUTION INTRAPERITONEAL EVERY 24 HOURS
Status: DISCONTINUED | OUTPATIENT
Start: 2024-01-01 | End: 2024-01-01

## 2024-01-01 RX ORDER — SODIUM BICARBONATE 650 MG/1
1300 TABLET ORAL 3 TIMES DAILY
Status: DISCONTINUED | OUTPATIENT
Start: 2024-01-01 | End: 2024-04-26 | Stop reason: HOSPADM

## 2024-01-01 RX ORDER — PANTOPRAZOLE SODIUM 40 MG/1
40 TABLET, DELAYED RELEASE ORAL
Status: DISCONTINUED | OUTPATIENT
Start: 2024-01-01 | End: 2024-01-01

## 2024-01-01 RX ORDER — SODIUM CHLORIDE, SODIUM LACTATE, CALCIUM CHLORIDE, MAGNESIUM CHLORIDE AND DEXTROSE 2.5; 538; 448; 18.3; 5.08 G/100ML; MG/100ML; MG/100ML; MG/100ML; MG/100ML
2000 INJECTION, SOLUTION INTRAPERITONEAL DAILY
Status: DISCONTINUED | OUTPATIENT
Start: 2024-01-01 | End: 2024-01-01

## 2024-01-01 RX ORDER — CALCIUM CARBONATE 500 MG/1
500 TABLET, CHEWABLE ORAL 3 TIMES DAILY PRN
Status: DISCONTINUED | OUTPATIENT
Start: 2024-01-01 | End: 2024-04-26 | Stop reason: HOSPADM

## 2024-01-01 RX ORDER — ETOMIDATE 2 MG/ML
40 INJECTION INTRAVENOUS ONCE
Status: COMPLETED | OUTPATIENT
Start: 2024-01-01 | End: 2024-01-01

## 2024-01-01 RX ORDER — EPINEPHRINE 0.1 MG/ML
1 INJECTION INTRAVENOUS ONCE
Status: COMPLETED | OUTPATIENT
Start: 2024-01-01 | End: 2024-01-01

## 2024-01-01 RX ADMIN — SODIUM BICARBONATE 50 MEQ: 84 INJECTION INTRAVENOUS at 17:52

## 2024-01-01 RX ADMIN — CALCIUM CARBONATE (ANTACID) CHEW TAB 500 MG 500 MG: 500 CHEW TAB at 00:06

## 2024-01-01 RX ADMIN — ERYTHROMYCIN: 5 OINTMENT OPHTHALMIC at 11:41

## 2024-01-01 RX ADMIN — SODIUM BICARBONATE 50 MEQ: 84 INJECTION INTRAVENOUS at 17:36

## 2024-01-01 RX ADMIN — SODIUM CHLORIDE, SODIUM LACTATE, CALCIUM CHLORIDE, MAGNESIUM CHLORIDE AND DEXTROSE 6000 ML: 2.5; 538; 448; 18.3; 5.08 INJECTION, SOLUTION INTRAPERITONEAL at 17:58

## 2024-01-01 RX ADMIN — MAGNESIUM SULFATE HEPTAHYDRATE 4000 MG: 40 INJECTION, SOLUTION INTRAVENOUS at 02:33

## 2024-01-01 RX ADMIN — ERYTHROMYCIN: 5 OINTMENT OPHTHALMIC at 06:30

## 2024-01-01 RX ADMIN — HEPARIN SODIUM 5000 UNITS: 5000 INJECTION INTRAVENOUS; SUBCUTANEOUS at 21:36

## 2024-01-01 RX ADMIN — ACETAMINOPHEN 650 MG: 325 TABLET ORAL at 23:51

## 2024-01-01 RX ADMIN — ACETAMINOPHEN 650 MG: 650 SUPPOSITORY RECTAL at 22:15

## 2024-01-01 RX ADMIN — SODIUM CHLORIDE, SODIUM LACTATE, CALCIUM CHLORIDE, MAGNESIUM CHLORIDE AND DEXTROSE 6000 ML: 2.5; 538; 448; 18.3; 5.08 INJECTION, SOLUTION INTRAPERITONEAL at 15:36

## 2024-01-01 RX ADMIN — ALBUMIN (HUMAN) 25 G: 12.5 INJECTION, SOLUTION INTRAVENOUS at 11:37

## 2024-01-01 RX ADMIN — WATER 50 MG: 1 INJECTION INTRAMUSCULAR; INTRAVENOUS; SUBCUTANEOUS at 20:52

## 2024-01-01 RX ADMIN — CALCIUM CHLORIDE, MAGNESIUM CHLORIDE, DEXTROSE MONOHYDRATE, LACTIC ACID, SODIUM CHLORIDE, SODIUM BICARBONATE AND POTASSIUM CHLORIDE: 3.68; 3.05; 22; 5.4; 6.46; 3.09; .314 INJECTION INTRAVENOUS at 03:02

## 2024-01-01 RX ADMIN — HEPARIN SODIUM 5000 UNITS: 5000 INJECTION INTRAVENOUS; SUBCUTANEOUS at 13:39

## 2024-01-01 RX ADMIN — SODIUM CHLORIDE, SODIUM LACTATE, CALCIUM CHLORIDE, MAGNESIUM CHLORIDE AND DEXTROSE 6000 ML: 1.5; 538; 448; 18.3; 5.08 INJECTION, SOLUTION INTRAPERITONEAL at 16:13

## 2024-01-01 RX ADMIN — CALCIUM CHLORIDE, MAGNESIUM CHLORIDE, DEXTROSE MONOHYDRATE, LACTIC ACID, SODIUM CHLORIDE, SODIUM BICARBONATE AND POTASSIUM CHLORIDE: 3.68; 3.05; 22; 5.4; 6.46; 3.09; .314 INJECTION INTRAVENOUS at 10:30

## 2024-01-01 RX ADMIN — VANCOMYCIN HYDROCHLORIDE 1000 MG: 1 INJECTION, POWDER, LYOPHILIZED, FOR SOLUTION INTRAVENOUS at 03:18

## 2024-01-01 RX ADMIN — WATER 50 MG: 1 INJECTION INTRAMUSCULAR; INTRAVENOUS; SUBCUTANEOUS at 08:49

## 2024-01-01 RX ADMIN — HEPARIN SODIUM 5000 UNITS: 5000 INJECTION INTRAVENOUS; SUBCUTANEOUS at 14:12

## 2024-01-01 RX ADMIN — SODIUM CHLORIDE 1000 ML: 9 INJECTION, SOLUTION INTRAVENOUS at 17:01

## 2024-01-01 RX ADMIN — SODIUM CHLORIDE, PRESERVATIVE FREE 10 ML: 5 INJECTION INTRAVENOUS at 08:52

## 2024-01-01 RX ADMIN — CARVEDILOL 6.25 MG: 12.5 TABLET, FILM COATED ORAL at 08:50

## 2024-01-01 RX ADMIN — SODIUM CHLORIDE, SODIUM LACTATE, CALCIUM CHLORIDE, MAGNESIUM CHLORIDE AND DEXTROSE 6000 ML: 1.5; 538; 448; 18.3; 5.08 INJECTION, SOLUTION INTRAPERITONEAL at 15:35

## 2024-01-01 RX ADMIN — SODIUM CHLORIDE, PRESERVATIVE FREE 10 ML: 5 INJECTION INTRAVENOUS at 09:45

## 2024-01-01 RX ADMIN — VASOPRESSIN 0.04 UNITS/MIN: 20 INJECTION INTRAVENOUS at 18:11

## 2024-01-01 RX ADMIN — ALBUMIN (HUMAN) 25 G: 0.25 INJECTION, SOLUTION INTRAVENOUS at 10:58

## 2024-01-01 RX ADMIN — ALBUMIN (HUMAN) 25 G: 12.5 INJECTION, SOLUTION INTRAVENOUS at 09:08

## 2024-01-01 RX ADMIN — CALCIUM CHLORIDE, MAGNESIUM CHLORIDE, DEXTROSE MONOHYDRATE, LACTIC ACID, SODIUM CHLORIDE, SODIUM BICARBONATE AND POTASSIUM CHLORIDE: 3.68; 3.05; 22; 5.4; 6.46; 3.09; .314 INJECTION INTRAVENOUS at 10:48

## 2024-01-01 RX ADMIN — NOREPINEPHRINE BITARTRATE 40 MCG/MIN: 1 INJECTION, SOLUTION, CONCENTRATE INTRAVENOUS at 04:50

## 2024-01-01 RX ADMIN — DEXTROSE AND SODIUM CHLORIDE: 5; 900 INJECTION, SOLUTION INTRAVENOUS at 19:28

## 2024-01-01 RX ADMIN — SODIUM BICARBONATE: 84 INJECTION, SOLUTION INTRAVENOUS at 20:12

## 2024-01-01 RX ADMIN — DEXTROSE MONOHYDRATE 25 G: 25 INJECTION, SOLUTION INTRAVENOUS at 17:06

## 2024-01-01 RX ADMIN — ERYTHROMYCIN: 5 OINTMENT OPHTHALMIC at 17:20

## 2024-01-01 RX ADMIN — SODIUM BICARBONATE: 84 INJECTION, SOLUTION INTRAVENOUS at 05:53

## 2024-01-01 RX ADMIN — HEPARIN SODIUM 5000 UNITS: 5000 INJECTION INTRAVENOUS; SUBCUTANEOUS at 13:34

## 2024-01-01 RX ADMIN — NOREPINEPHRINE BITARTRATE 60 MCG/MIN: 1 INJECTION, SOLUTION, CONCENTRATE INTRAVENOUS at 14:16

## 2024-01-01 RX ADMIN — SODIUM CHLORIDE 40 MCG/MIN: 9 INJECTION, SOLUTION INTRAVENOUS at 22:33

## 2024-01-01 RX ADMIN — ERYTHROMYCIN: 5 OINTMENT OPHTHALMIC at 00:00

## 2024-01-01 RX ADMIN — ALBUMIN (HUMAN) 12.5 G: 12.5 INJECTION, SOLUTION INTRAVENOUS at 09:45

## 2024-01-01 RX ADMIN — Medication 0.04 UNITS/MIN: at 14:00

## 2024-01-01 RX ADMIN — ALBUMIN (HUMAN) 25 G: 0.25 INJECTION, SOLUTION INTRAVENOUS at 23:30

## 2024-01-01 RX ADMIN — METRONIDAZOLE 500 MG: 500 INJECTION, SOLUTION INTRAVENOUS at 12:33

## 2024-01-01 RX ADMIN — CALCIUM CHLORIDE, MAGNESIUM CHLORIDE, DEXTROSE MONOHYDRATE, LACTIC ACID, SODIUM CHLORIDE, SODIUM BICARBONATE AND POTASSIUM CHLORIDE: 3.68; 3.05; 22; 5.4; 6.46; 3.09; .314 INJECTION INTRAVENOUS at 03:08

## 2024-01-01 RX ADMIN — GLYCERIN, PETROLATUM, PHENYLEPHRINE HCL, PRAMOXINE HCL: 144; 2.5; 10; 15 CREAM TOPICAL at 22:08

## 2024-01-01 RX ADMIN — ERYTHROMYCIN: 5 OINTMENT OPHTHALMIC at 11:45

## 2024-01-01 RX ADMIN — SODIUM BICARBONATE: 84 INJECTION, SOLUTION INTRAVENOUS at 09:29

## 2024-01-01 RX ADMIN — SODIUM CHLORIDE 500 ML: 9 INJECTION, SOLUTION INTRAVENOUS at 23:30

## 2024-01-01 RX ADMIN — PANTOPRAZOLE SODIUM 40 MG: 40 INJECTION, POWDER, FOR SOLUTION INTRAVENOUS at 08:43

## 2024-01-01 RX ADMIN — SODIUM BICARBONATE 50 MEQ: 84 INJECTION INTRAVENOUS at 01:42

## 2024-01-01 RX ADMIN — ERYTHROMYCIN: 5 OINTMENT OPHTHALMIC at 06:56

## 2024-01-01 RX ADMIN — DEXMEDETOMIDINE HYDROCHLORIDE 0.5 MCG/KG/HR: 400 INJECTION, SOLUTION INTRAVENOUS at 03:29

## 2024-01-01 RX ADMIN — ERYTHROMYCIN: 5 OINTMENT OPHTHALMIC at 12:01

## 2024-01-01 RX ADMIN — SODIUM CHLORIDE, POTASSIUM CHLORIDE, SODIUM LACTATE AND CALCIUM CHLORIDE 500 ML: 600; 310; 30; 20 INJECTION, SOLUTION INTRAVENOUS at 12:10

## 2024-01-01 RX ADMIN — Medication 3 MG: at 23:27

## 2024-01-01 RX ADMIN — SODIUM CHLORIDE, PRESERVATIVE FREE 10 ML: 5 INJECTION INTRAVENOUS at 20:17

## 2024-01-01 RX ADMIN — Medication 75 MCG/HR: at 01:01

## 2024-01-01 RX ADMIN — CALCIUM CHLORIDE, MAGNESIUM CHLORIDE, DEXTROSE MONOHYDRATE, LACTIC ACID, SODIUM CHLORIDE, SODIUM BICARBONATE AND POTASSIUM CHLORIDE: 3.68; 3.05; 22; 5.4; 6.46; 3.09; .314 INJECTION INTRAVENOUS at 18:38

## 2024-01-01 RX ADMIN — IOPAMIDOL 100 ML: 755 INJECTION, SOLUTION INTRAVENOUS at 18:42

## 2024-01-01 RX ADMIN — ACETAMINOPHEN 650 MG: 325 TABLET ORAL at 08:35

## 2024-01-01 RX ADMIN — ALBUMIN (HUMAN) 25 G: 0.25 INJECTION, SOLUTION INTRAVENOUS at 15:10

## 2024-01-01 RX ADMIN — ARFORMOTEROL TARTRATE: 15 SOLUTION RESPIRATORY (INHALATION) at 07:13

## 2024-01-01 RX ADMIN — CEFEPIME 2000 MG: 2 INJECTION, POWDER, FOR SOLUTION INTRAVENOUS at 23:06

## 2024-01-01 RX ADMIN — HEPARIN SODIUM 5000 UNITS: 5000 INJECTION INTRAVENOUS; SUBCUTANEOUS at 13:50

## 2024-01-01 RX ADMIN — ACETAMINOPHEN 650 MG: 325 TABLET ORAL at 17:21

## 2024-01-01 RX ADMIN — SODIUM CHLORIDE, PRESERVATIVE FREE 10 ML: 5 INJECTION INTRAVENOUS at 22:00

## 2024-01-01 RX ADMIN — SODIUM CHLORIDE, SODIUM LACTATE, CALCIUM CHLORIDE, MAGNESIUM CHLORIDE AND DEXTROSE 6000 ML: 1.5; 538; 448; 18.3; 5.08 INJECTION, SOLUTION INTRAPERITONEAL at 17:56

## 2024-01-01 RX ADMIN — PANTOPRAZOLE SODIUM 40 MG: 40 INJECTION, POWDER, FOR SOLUTION INTRAVENOUS at 08:17

## 2024-01-01 RX ADMIN — CALCIUM CHLORIDE, MAGNESIUM CHLORIDE, DEXTROSE MONOHYDRATE, LACTIC ACID, SODIUM CHLORIDE, SODIUM BICARBONATE AND POTASSIUM CHLORIDE: 3.68; 3.05; 22; 5.4; 6.46; 3.09; .314 INJECTION INTRAVENOUS at 07:21

## 2024-01-01 RX ADMIN — Medication 3 MG: at 01:23

## 2024-01-01 RX ADMIN — ARFORMOTEROL TARTRATE: 15 SOLUTION RESPIRATORY (INHALATION) at 19:09

## 2024-01-01 RX ADMIN — SODIUM CHLORIDE, PRESERVATIVE FREE 10 ML: 5 INJECTION INTRAVENOUS at 08:36

## 2024-01-01 RX ADMIN — SODIUM CHLORIDE, PRESERVATIVE FREE 10 ML: 5 INJECTION INTRAVENOUS at 09:08

## 2024-01-01 RX ADMIN — ACETAMINOPHEN 650 MG: 325 TABLET ORAL at 01:23

## 2024-01-01 RX ADMIN — Medication 0.04 UNITS/MIN: at 22:31

## 2024-01-01 RX ADMIN — SODIUM BICARBONATE: 84 INJECTION, SOLUTION INTRAVENOUS at 13:20

## 2024-01-01 RX ADMIN — HEPARIN SODIUM 5000 UNITS: 5000 INJECTION INTRAVENOUS; SUBCUTANEOUS at 06:07

## 2024-01-01 RX ADMIN — GENTAMICIN SULFATE: 1 CREAM TOPICAL at 20:15

## 2024-01-01 RX ADMIN — ASPIRIN 81 MG CHEWABLE TABLET 81 MG: 81 TABLET CHEWABLE at 11:47

## 2024-01-01 RX ADMIN — SODIUM CHLORIDE, SODIUM LACTATE, CALCIUM CHLORIDE, MAGNESIUM CHLORIDE AND DEXTROSE 6000 ML: 2.5; 538; 448; 18.3; 5.08 INJECTION, SOLUTION INTRAPERITONEAL at 19:49

## 2024-01-01 RX ADMIN — VASOPRESSIN 0.03 UNITS/MIN: 20 INJECTION INTRAVENOUS at 04:21

## 2024-01-01 RX ADMIN — ASPIRIN 81 MG CHEWABLE TABLET 81 MG: 81 TABLET CHEWABLE at 11:45

## 2024-01-01 RX ADMIN — ONDANSETRON 4 MG: 2 INJECTION INTRAMUSCULAR; INTRAVENOUS at 06:06

## 2024-01-01 RX ADMIN — ERYTHROMYCIN: 5 OINTMENT OPHTHALMIC at 07:14

## 2024-01-01 RX ADMIN — SODIUM CHLORIDE, SODIUM LACTATE, CALCIUM CHLORIDE, MAGNESIUM CHLORIDE AND DEXTROSE 6000 ML: 2.5; 538; 448; 18.3; 5.08 INJECTION, SOLUTION INTRAPERITONEAL at 19:30

## 2024-01-01 RX ADMIN — VASOPRESSIN 0.04 UNITS/MIN: 20 INJECTION INTRAVENOUS at 10:59

## 2024-01-01 RX ADMIN — ALBUMIN (HUMAN) 25 G: 0.25 INJECTION, SOLUTION INTRAVENOUS at 03:04

## 2024-01-01 RX ADMIN — PANTOPRAZOLE SODIUM 40 MG: 40 INJECTION, POWDER, FOR SOLUTION INTRAVENOUS at 12:41

## 2024-01-01 RX ADMIN — EPINEPHRINE 0.3 MG: 0.1 INJECTION, SOLUTION INTRAVENOUS at 17:51

## 2024-01-01 RX ADMIN — ERYTHROMYCIN: 5 OINTMENT OPHTHALMIC at 13:39

## 2024-01-01 RX ADMIN — CALCIUM CHLORIDE, MAGNESIUM CHLORIDE, DEXTROSE MONOHYDRATE, LACTIC ACID, SODIUM CHLORIDE, SODIUM BICARBONATE AND POTASSIUM CHLORIDE: 3.68; 3.05; 22; 5.4; 6.46; 3.09; .314 INJECTION INTRAVENOUS at 22:13

## 2024-01-01 RX ADMIN — ARFORMOTEROL TARTRATE: 15 SOLUTION RESPIRATORY (INHALATION) at 21:45

## 2024-01-01 RX ADMIN — ONDANSETRON 4 MG: 2 INJECTION INTRAMUSCULAR; INTRAVENOUS at 18:13

## 2024-01-01 RX ADMIN — DEXTROSE MONOHYDRATE: 100 INJECTION, SOLUTION INTRAVENOUS at 05:47

## 2024-01-01 RX ADMIN — EPINEPHRINE 20 MCG/MIN: 1 INJECTION INTRAMUSCULAR; INTRAVENOUS; SUBCUTANEOUS at 16:20

## 2024-01-01 RX ADMIN — GENTAMICIN SULFATE: 1 CREAM TOPICAL at 15:36

## 2024-01-01 RX ADMIN — EPOETIN ALFA 4000 UNITS: 4000 SOLUTION INTRAVENOUS; SUBCUTANEOUS at 21:06

## 2024-01-01 RX ADMIN — Medication 5 MCG/MIN: at 23:10

## 2024-01-01 RX ADMIN — SODIUM CHLORIDE, PRESERVATIVE FREE 10 ML: 5 INJECTION INTRAVENOUS at 21:00

## 2024-01-01 RX ADMIN — PANTOPRAZOLE SODIUM 40 MG: 40 TABLET, DELAYED RELEASE ORAL at 16:42

## 2024-01-01 RX ADMIN — ARFORMOTEROL TARTRATE: 15 SOLUTION RESPIRATORY (INHALATION) at 09:35

## 2024-01-01 RX ADMIN — CALCIUM CHLORIDE, MAGNESIUM CHLORIDE, DEXTROSE MONOHYDRATE, LACTIC ACID, SODIUM CHLORIDE, SODIUM BICARBONATE AND POTASSIUM CHLORIDE: 3.68; 3.05; 22; 5.4; 6.46; 3.09; .314 INJECTION INTRAVENOUS at 18:01

## 2024-01-01 RX ADMIN — ALBUMIN (HUMAN) 25 G: 0.25 INJECTION, SOLUTION INTRAVENOUS at 21:03

## 2024-01-01 RX ADMIN — MIDODRINE HYDROCHLORIDE 10 MG: 5 TABLET ORAL at 08:22

## 2024-01-01 RX ADMIN — EPINEPHRINE 16 MCG/MIN: 1 INJECTION INTRAMUSCULAR; INTRAVENOUS; SUBCUTANEOUS at 04:44

## 2024-01-01 RX ADMIN — ONDANSETRON 4 MG: 2 INJECTION INTRAMUSCULAR; INTRAVENOUS at 17:41

## 2024-01-01 RX ADMIN — CALCIUM CHLORIDE, MAGNESIUM CHLORIDE, DEXTROSE MONOHYDRATE, LACTIC ACID, SODIUM CHLORIDE, SODIUM BICARBONATE AND POTASSIUM CHLORIDE: 3.68; 3.05; 22; 5.4; 6.46; 3.09; .314 INJECTION INTRAVENOUS at 18:37

## 2024-01-01 RX ADMIN — EPINEPHRINE 20 MCG/MIN: 1 INJECTION INTRAMUSCULAR; INTRAVENOUS; SUBCUTANEOUS at 22:57

## 2024-01-01 RX ADMIN — EPOETIN ALFA 4000 UNITS: 4000 SOLUTION INTRAVENOUS; SUBCUTANEOUS at 21:57

## 2024-01-01 RX ADMIN — SODIUM CHLORIDE 1000 ML: 9 INJECTION, SOLUTION INTRAVENOUS at 03:33

## 2024-01-01 RX ADMIN — MIDODRINE HYDROCHLORIDE 15 MG: 5 TABLET ORAL at 08:41

## 2024-01-01 RX ADMIN — WATER 50 MG: 1 INJECTION INTRAMUSCULAR; INTRAVENOUS; SUBCUTANEOUS at 03:05

## 2024-01-01 RX ADMIN — WATER 50 MG: 1 INJECTION INTRAMUSCULAR; INTRAVENOUS; SUBCUTANEOUS at 15:09

## 2024-01-01 RX ADMIN — SODIUM CHLORIDE, PRESERVATIVE FREE 10 ML: 5 INJECTION INTRAVENOUS at 20:46

## 2024-01-01 RX ADMIN — HEPARIN SODIUM 5000 UNITS: 5000 INJECTION INTRAVENOUS; SUBCUTANEOUS at 12:12

## 2024-01-01 RX ADMIN — Medication 100 MEQ: at 17:14

## 2024-01-01 RX ADMIN — SODIUM BICARBONATE: 84 INJECTION, SOLUTION INTRAVENOUS at 02:18

## 2024-01-01 RX ADMIN — PROCHLORPERAZINE EDISYLATE 5 MG: 5 INJECTION INTRAMUSCULAR; INTRAVENOUS at 22:05

## 2024-01-01 RX ADMIN — SODIUM CHLORIDE, SODIUM LACTATE, CALCIUM CHLORIDE, MAGNESIUM CHLORIDE AND DEXTROSE 6000 ML: 2.5; 538; 448; 18.3; 5.08 INJECTION, SOLUTION INTRAPERITONEAL at 15:47

## 2024-01-01 RX ADMIN — ALBUMIN (HUMAN) 25 G: 0.25 INJECTION, SOLUTION INTRAVENOUS at 08:26

## 2024-01-01 RX ADMIN — ASPIRIN 81 MG CHEWABLE TABLET 81 MG: 81 TABLET CHEWABLE at 12:03

## 2024-01-01 RX ADMIN — ACETAMINOPHEN 650 MG: 325 TABLET ORAL at 05:34

## 2024-01-01 RX ADMIN — ERYTHROMYCIN: 5 OINTMENT OPHTHALMIC at 07:04

## 2024-01-01 RX ADMIN — CALCIUM GLUCONATE 1000 MG: 20 INJECTION, SOLUTION INTRAVENOUS at 05:51

## 2024-01-01 RX ADMIN — HEPARIN SODIUM 5000 UNITS: 5000 INJECTION INTRAVENOUS; SUBCUTANEOUS at 21:55

## 2024-01-01 RX ADMIN — ERYTHROMYCIN: 5 OINTMENT OPHTHALMIC at 00:15

## 2024-01-01 RX ADMIN — EPOETIN ALFA 4000 UNITS: 4000 SOLUTION INTRAVENOUS; SUBCUTANEOUS at 23:18

## 2024-01-01 RX ADMIN — SODIUM CHLORIDE, PRESERVATIVE FREE 10 ML: 5 INJECTION INTRAVENOUS at 21:59

## 2024-01-01 RX ADMIN — ASPIRIN 81 MG CHEWABLE TABLET 81 MG: 81 TABLET CHEWABLE at 12:24

## 2024-01-01 RX ADMIN — PANTOPRAZOLE SODIUM 40 MG: 40 INJECTION, POWDER, FOR SOLUTION INTRAVENOUS at 08:10

## 2024-01-01 RX ADMIN — HEPARIN SODIUM 5000 UNITS: 5000 INJECTION INTRAVENOUS; SUBCUTANEOUS at 13:48

## 2024-01-01 RX ADMIN — HEPARIN SODIUM 5000 UNITS: 5000 INJECTION INTRAVENOUS; SUBCUTANEOUS at 06:08

## 2024-01-01 RX ADMIN — EPINEPHRINE 20 MCG/MIN: 1 INJECTION INTRAMUSCULAR; INTRAVENOUS; SUBCUTANEOUS at 14:15

## 2024-01-01 RX ADMIN — SODIUM BICARBONATE: 84 INJECTION, SOLUTION INTRAVENOUS at 02:34

## 2024-01-01 RX ADMIN — GENTAMICIN SULFATE: 1 CREAM TOPICAL at 17:58

## 2024-01-01 RX ADMIN — MONTELUKAST 10 MG: 10 TABLET, FILM COATED ORAL at 22:04

## 2024-01-01 RX ADMIN — METRONIDAZOLE 500 MG: 500 INJECTION, SOLUTION INTRAVENOUS at 04:26

## 2024-01-01 RX ADMIN — VANCOMYCIN HYDROCHLORIDE 1750 MG: 10 INJECTION, POWDER, LYOPHILIZED, FOR SOLUTION INTRAVENOUS at 00:30

## 2024-01-01 RX ADMIN — DEXTROSE MONOHYDRATE: 100 INJECTION, SOLUTION INTRAVENOUS at 18:13

## 2024-01-01 RX ADMIN — ARFORMOTEROL TARTRATE: 15 SOLUTION RESPIRATORY (INHALATION) at 19:34

## 2024-01-01 RX ADMIN — MIDODRINE HYDROCHLORIDE 5 MG: 5 TABLET ORAL at 17:34

## 2024-01-01 RX ADMIN — VASOPRESSIN 0.04 UNITS/MIN: 20 INJECTION INTRAVENOUS at 04:57

## 2024-01-01 RX ADMIN — GENTAMICIN SULFATE: 1 CREAM TOPICAL at 16:17

## 2024-01-01 RX ADMIN — SODIUM CHLORIDE, PRESERVATIVE FREE 10 ML: 5 INJECTION INTRAVENOUS at 21:25

## 2024-01-01 RX ADMIN — FENTANYL CITRATE 50 MCG: 50 INJECTION INTRAMUSCULAR; INTRAVENOUS at 02:26

## 2024-01-01 RX ADMIN — SODIUM CHLORIDE, SODIUM LACTATE, CALCIUM CHLORIDE, MAGNESIUM CHLORIDE AND DEXTROSE 6000 ML: 1.5; 538; 448; 18.3; 5.08 INJECTION, SOLUTION INTRAPERITONEAL at 19:30

## 2024-01-01 RX ADMIN — SODIUM BICARBONATE: 84 INJECTION, SOLUTION INTRAVENOUS at 16:18

## 2024-01-01 RX ADMIN — ARFORMOTEROL TARTRATE: 15 SOLUTION RESPIRATORY (INHALATION) at 08:31

## 2024-01-01 RX ADMIN — ERYTHROMYCIN: 5 OINTMENT OPHTHALMIC at 17:29

## 2024-01-01 RX ADMIN — SODIUM CHLORIDE: 9 INJECTION, SOLUTION INTRAVENOUS at 22:57

## 2024-01-01 RX ADMIN — SODIUM BICARBONATE 150 MEQ: 84 INJECTION, SOLUTION INTRAVENOUS at 00:41

## 2024-01-01 RX ADMIN — EPINEPHRINE 16 MCG/MIN: 1 INJECTION INTRAMUSCULAR; INTRAVENOUS; SUBCUTANEOUS at 23:09

## 2024-01-01 RX ADMIN — EPOETIN ALFA 4000 UNITS: 4000 SOLUTION INTRAVENOUS; SUBCUTANEOUS at 20:32

## 2024-01-01 RX ADMIN — EPINEPHRINE 1 MG: 0.1 INJECTION INTRAVENOUS at 17:37

## 2024-01-01 RX ADMIN — CARVEDILOL 6.25 MG: 12.5 TABLET, FILM COATED ORAL at 17:34

## 2024-01-01 RX ADMIN — HEPARIN SODIUM 5000 UNITS: 5000 INJECTION INTRAVENOUS; SUBCUTANEOUS at 06:25

## 2024-01-01 RX ADMIN — Medication 75 MCG/HR: at 15:08

## 2024-01-01 RX ADMIN — GLYCERIN, PETROLATUM, PHENYLEPHRINE HCL, PRAMOXINE HCL: 144; 2.5; 10; 15 CREAM TOPICAL at 22:34

## 2024-01-01 RX ADMIN — DEXMEDETOMIDINE HYDROCHLORIDE 0.5 MCG/KG/HR: 400 INJECTION, SOLUTION INTRAVENOUS at 18:12

## 2024-01-01 RX ADMIN — DEXTROSE MONOHYDRATE: 100 INJECTION, SOLUTION INTRAVENOUS at 10:58

## 2024-01-01 RX ADMIN — CEFEPIME 2000 MG: 2 INJECTION, POWDER, FOR SOLUTION INTRAVENOUS at 12:14

## 2024-01-01 RX ADMIN — SODIUM BICARBONATE 1300 MG: 650 TABLET ORAL at 08:41

## 2024-01-01 RX ADMIN — WATER 50 MG: 1 INJECTION INTRAMUSCULAR; INTRAVENOUS; SUBCUTANEOUS at 15:43

## 2024-01-01 RX ADMIN — SODIUM CHLORIDE 30 MCG/MIN: 9 INJECTION, SOLUTION INTRAVENOUS at 13:39

## 2024-01-01 RX ADMIN — ETOMIDATE 40 MG: 2 INJECTION, SOLUTION INTRAVENOUS at 17:10

## 2024-01-01 RX ADMIN — HEPARIN SODIUM 5000 UNITS: 5000 INJECTION INTRAVENOUS; SUBCUTANEOUS at 06:27

## 2024-01-01 RX ADMIN — HEPARIN SODIUM 5000 UNITS: 5000 INJECTION INTRAVENOUS; SUBCUTANEOUS at 22:08

## 2024-01-01 RX ADMIN — WATER 50 MG: 1 INJECTION INTRAMUSCULAR; INTRAVENOUS; SUBCUTANEOUS at 08:19

## 2024-01-01 RX ADMIN — ACETAMINOPHEN 650 MG: 325 TABLET ORAL at 22:22

## 2024-01-01 RX ADMIN — Medication 50 MCG/HR: at 17:57

## 2024-01-01 RX ADMIN — CEFEPIME 2000 MG: 2 INJECTION, POWDER, FOR SOLUTION INTRAVENOUS at 11:37

## 2024-01-01 RX ADMIN — ARFORMOTEROL TARTRATE: 15 SOLUTION RESPIRATORY (INHALATION) at 19:59

## 2024-01-01 RX ADMIN — CALCIUM CHLORIDE, MAGNESIUM CHLORIDE, DEXTROSE MONOHYDRATE, LACTIC ACID, SODIUM CHLORIDE, SODIUM BICARBONATE AND POTASSIUM CHLORIDE: 3.68; 3.05; 22; 5.4; 6.46; 3.09; .314 INJECTION INTRAVENOUS at 10:50

## 2024-01-01 RX ADMIN — SODIUM CHLORIDE: 9 INJECTION, SOLUTION INTRAVENOUS at 20:14

## 2024-01-01 RX ADMIN — GLYCERIN, PETROLATUM, PHENYLEPHRINE HCL, PRAMOXINE HCL: 144; 2.5; 10; 15 CREAM TOPICAL at 08:57

## 2024-01-01 RX ADMIN — ERYTHROMYCIN: 5 OINTMENT OPHTHALMIC at 17:23

## 2024-01-01 RX ADMIN — SODIUM BICARBONATE 1300 MG: 650 TABLET ORAL at 14:11

## 2024-01-01 RX ADMIN — ACETAMINOPHEN 650 MG: 325 TABLET ORAL at 06:11

## 2024-01-01 RX ADMIN — Medication 3 MG: at 00:06

## 2024-01-01 RX ADMIN — ARFORMOTEROL TARTRATE: 15 SOLUTION RESPIRATORY (INHALATION) at 20:35

## 2024-01-01 RX ADMIN — VANCOMYCIN HYDROCHLORIDE 1000 MG: 1 INJECTION, POWDER, LYOPHILIZED, FOR SOLUTION INTRAVENOUS at 03:15

## 2024-01-01 RX ADMIN — NOREPINEPHRINE BITARTRATE 60 MCG/MIN: 1 INJECTION, SOLUTION, CONCENTRATE INTRAVENOUS at 22:58

## 2024-01-01 RX ADMIN — SODIUM CHLORIDE, SODIUM LACTATE, CALCIUM CHLORIDE, MAGNESIUM CHLORIDE AND DEXTROSE 6000 ML: 1.5; 538; 448; 18.3; 5.08 INJECTION, SOLUTION INTRAPERITONEAL at 15:37

## 2024-01-01 RX ADMIN — NOREPINEPHRINE BITARTRATE 60 MCG/MIN: 1 INJECTION, SOLUTION, CONCENTRATE INTRAVENOUS at 16:23

## 2024-01-01 RX ADMIN — HEPARIN SODIUM 5000 UNITS: 5000 INJECTION INTRAVENOUS; SUBCUTANEOUS at 23:17

## 2024-01-01 RX ADMIN — SODIUM CHLORIDE: 9 INJECTION, SOLUTION INTRAVENOUS at 10:10

## 2024-01-01 RX ADMIN — ARFORMOTEROL TARTRATE: 15 SOLUTION RESPIRATORY (INHALATION) at 09:04

## 2024-01-01 RX ADMIN — IBUPROFEN 400 MG: 400 TABLET, FILM COATED ORAL at 13:34

## 2024-01-01 RX ADMIN — ARFORMOTEROL TARTRATE: 15 SOLUTION RESPIRATORY (INHALATION) at 21:20

## 2024-01-01 RX ADMIN — ARFORMOTEROL TARTRATE: 15 SOLUTION RESPIRATORY (INHALATION) at 20:05

## 2024-01-01 RX ADMIN — METRONIDAZOLE 500 MG: 500 INJECTION, SOLUTION INTRAVENOUS at 12:18

## 2024-01-01 RX ADMIN — HEPARIN SODIUM 5000 UNITS: 5000 INJECTION INTRAVENOUS; SUBCUTANEOUS at 06:15

## 2024-01-01 RX ADMIN — ARFORMOTEROL TARTRATE: 15 SOLUTION RESPIRATORY (INHALATION) at 07:19

## 2024-01-01 RX ADMIN — CEFEPIME 1000 MG: 1 INJECTION, POWDER, FOR SOLUTION INTRAMUSCULAR; INTRAVENOUS at 23:00

## 2024-01-01 RX ADMIN — ERYTHROMYCIN: 5 OINTMENT OPHTHALMIC at 17:37

## 2024-01-01 RX ADMIN — SODIUM BICARBONATE 50 MEQ: 84 INJECTION, SOLUTION INTRAVENOUS at 08:50

## 2024-01-01 RX ADMIN — ACETAMINOPHEN 650 MG: 325 TABLET ORAL at 23:27

## 2024-01-01 RX ADMIN — CALCIUM CHLORIDE, MAGNESIUM CHLORIDE, DEXTROSE MONOHYDRATE, LACTIC ACID, SODIUM CHLORIDE, SODIUM BICARBONATE AND POTASSIUM CHLORIDE: 3.68; 3.05; 22; 5.4; 6.46; 3.09; .314 INJECTION INTRAVENOUS at 18:00

## 2024-01-01 RX ADMIN — CARVEDILOL 3.12 MG: 3.12 TABLET, FILM COATED ORAL at 12:02

## 2024-01-01 RX ADMIN — LORAZEPAM 2 MG: 2 INJECTION INTRAMUSCULAR; INTRAVENOUS at 12:29

## 2024-01-01 RX ADMIN — ASPIRIN 81 MG CHEWABLE TABLET 81 MG: 81 TABLET CHEWABLE at 11:36

## 2024-01-01 RX ADMIN — MIDODRINE HYDROCHLORIDE 10 MG: 5 TABLET ORAL at 11:36

## 2024-01-01 RX ADMIN — SODIUM CHLORIDE, PRESERVATIVE FREE 10 ML: 5 INJECTION INTRAVENOUS at 09:09

## 2024-01-01 RX ADMIN — PANTOPRAZOLE SODIUM 40 MG: 40 INJECTION, POWDER, FOR SOLUTION INTRAVENOUS at 08:44

## 2024-01-01 RX ADMIN — DEXMEDETOMIDINE HYDROCHLORIDE 0.5 MCG/KG/HR: 400 INJECTION, SOLUTION INTRAVENOUS at 12:11

## 2024-01-01 RX ADMIN — GENTAMICIN SULFATE: 1 CREAM TOPICAL at 15:53

## 2024-01-01 RX ADMIN — SODIUM CHLORIDE 5 MCG/MIN: 9 INJECTION, SOLUTION INTRAVENOUS at 23:10

## 2024-01-01 RX ADMIN — ERYTHROMYCIN: 5 OINTMENT OPHTHALMIC at 17:34

## 2024-01-01 RX ADMIN — HEPARIN SODIUM 5000 UNITS: 5000 INJECTION INTRAVENOUS; SUBCUTANEOUS at 21:59

## 2024-01-01 RX ADMIN — ACETAMINOPHEN 650 MG: 325 TABLET ORAL at 16:09

## 2024-01-01 RX ADMIN — ONDANSETRON 4 MG: 2 INJECTION INTRAMUSCULAR; INTRAVENOUS at 21:55

## 2024-01-01 RX ADMIN — CALCIUM CHLORIDE, MAGNESIUM CHLORIDE, DEXTROSE MONOHYDRATE, LACTIC ACID, SODIUM CHLORIDE, SODIUM BICARBONATE AND POTASSIUM CHLORIDE: 3.68; 3.05; 22; 5.4; 6.46; 3.09; .314 INJECTION INTRAVENOUS at 02:49

## 2024-01-01 RX ADMIN — MIDODRINE HYDROCHLORIDE 10 MG: 5 TABLET ORAL at 17:19

## 2024-01-01 RX ADMIN — ERYTHROMYCIN: 5 OINTMENT OPHTHALMIC at 12:13

## 2024-01-01 RX ADMIN — Medication 75 MCG/HR: at 12:08

## 2024-01-01 RX ADMIN — ERYTHROMYCIN: 5 OINTMENT OPHTHALMIC at 23:19

## 2024-01-01 RX ADMIN — SODIUM CHLORIDE, PRESERVATIVE FREE 10 ML: 5 INJECTION INTRAVENOUS at 08:14

## 2024-01-01 RX ADMIN — SODIUM BICARBONATE 100 MEQ: 84 INJECTION INTRAVENOUS at 22:44

## 2024-01-01 RX ADMIN — EPINEPHRINE 20 MCG/MIN: 1 INJECTION INTRAMUSCULAR; INTRAVENOUS; SUBCUTANEOUS at 07:37

## 2024-01-01 RX ADMIN — ALBUMIN (HUMAN) 25 G: 0.25 INJECTION, SOLUTION INTRAVENOUS at 15:12

## 2024-01-01 RX ADMIN — SODIUM CHLORIDE, SODIUM LACTATE, CALCIUM CHLORIDE, MAGNESIUM CHLORIDE AND DEXTROSE 6000 ML: 1.5; 538; 448; 18.3; 5.08 INJECTION, SOLUTION INTRAPERITONEAL at 17:57

## 2024-01-01 RX ADMIN — SODIUM CHLORIDE, POTASSIUM CHLORIDE, SODIUM LACTATE AND CALCIUM CHLORIDE 1000 ML: 600; 310; 30; 20 INJECTION, SOLUTION INTRAVENOUS at 23:50

## 2024-01-01 RX ADMIN — MIDODRINE HYDROCHLORIDE 5 MG: 5 TABLET ORAL at 11:45

## 2024-01-01 RX ADMIN — ASPIRIN 81 MG CHEWABLE TABLET 81 MG: 81 TABLET CHEWABLE at 12:01

## 2024-01-01 RX ADMIN — METRONIDAZOLE 500 MG: 500 INJECTION, SOLUTION INTRAVENOUS at 20:16

## 2024-01-01 RX ADMIN — CALCIUM CHLORIDE, MAGNESIUM CHLORIDE, DEXTROSE MONOHYDRATE, LACTIC ACID, SODIUM CHLORIDE, SODIUM BICARBONATE AND POTASSIUM CHLORIDE: 3.68; 3.05; 22; 5.4; 6.46; 3.09; .314 INJECTION INTRAVENOUS at 18:36

## 2024-01-01 RX ADMIN — MIDODRINE HYDROCHLORIDE 5 MG: 5 TABLET ORAL at 08:50

## 2024-01-01 RX ADMIN — Medication 100 MEQ: at 22:44

## 2024-01-01 RX ADMIN — MONTELUKAST 10 MG: 10 TABLET, FILM COATED ORAL at 22:10

## 2024-01-01 RX ADMIN — MIDODRINE HYDROCHLORIDE 5 MG: 5 TABLET ORAL at 08:31

## 2024-01-01 RX ADMIN — SODIUM CHLORIDE, PRESERVATIVE FREE 10 ML: 5 INJECTION INTRAVENOUS at 09:18

## 2024-01-01 RX ADMIN — SODIUM CHLORIDE 250 ML: 9 INJECTION, SOLUTION INTRAVENOUS at 02:13

## 2024-01-01 RX ADMIN — NOREPINEPHRINE BITARTRATE 60 MCG/MIN: 1 INJECTION, SOLUTION, CONCENTRATE INTRAVENOUS at 08:27

## 2024-01-01 RX ADMIN — ERYTHROMYCIN: 5 OINTMENT OPHTHALMIC at 22:22

## 2024-01-01 RX ADMIN — MIDAZOLAM 2 MG: 1 INJECTION INTRAMUSCULAR; INTRAVENOUS at 19:15

## 2024-01-01 RX ADMIN — CALCIUM CHLORIDE, MAGNESIUM CHLORIDE, DEXTROSE MONOHYDRATE, LACTIC ACID, SODIUM CHLORIDE, SODIUM BICARBONATE AND POTASSIUM CHLORIDE: 3.68; 3.05; 22; 5.4; 6.46; 3.09; .314 INJECTION INTRAVENOUS at 10:51

## 2024-01-01 RX ADMIN — POTASSIUM BICARBONATE 40 MEQ: 782 TABLET, EFFERVESCENT ORAL at 08:41

## 2024-01-01 RX ADMIN — PROPOFOL 10 MCG/KG/MIN: 10 INJECTION, EMULSION INTRAVENOUS at 10:23

## 2024-01-01 RX ADMIN — CALCIUM CHLORIDE, MAGNESIUM CHLORIDE, DEXTROSE MONOHYDRATE, LACTIC ACID, SODIUM CHLORIDE, SODIUM BICARBONATE AND POTASSIUM CHLORIDE: 3.68; 3.05; 22; 5.4; 6.46; 3.09; .314 INJECTION INTRAVENOUS at 10:45

## 2024-01-01 RX ADMIN — MIDODRINE HYDROCHLORIDE 10 MG: 5 TABLET ORAL at 17:31

## 2024-01-01 RX ADMIN — ARFORMOTEROL TARTRATE: 15 SOLUTION RESPIRATORY (INHALATION) at 07:21

## 2024-01-01 RX ADMIN — CALCIUM CHLORIDE, MAGNESIUM CHLORIDE, DEXTROSE MONOHYDRATE, LACTIC ACID, SODIUM CHLORIDE, SODIUM BICARBONATE AND POTASSIUM CHLORIDE: 3.68; 3.05; 22; 5.4; 6.46; 3.09; .314 INJECTION INTRAVENOUS at 02:50

## 2024-01-01 RX ADMIN — DEXMEDETOMIDINE HYDROCHLORIDE 0.5 MCG/KG/HR: 400 INJECTION, SOLUTION INTRAVENOUS at 10:58

## 2024-01-01 RX ADMIN — HEPARIN SODIUM 5000 UNITS: 5000 INJECTION INTRAVENOUS; SUBCUTANEOUS at 07:05

## 2024-01-01 RX ADMIN — SODIUM CHLORIDE, PRESERVATIVE FREE 10 ML: 5 INJECTION INTRAVENOUS at 20:33

## 2024-01-01 RX ADMIN — SODIUM BICARBONATE: 84 INJECTION, SOLUTION INTRAVENOUS at 17:50

## 2024-01-01 RX ADMIN — SODIUM BICARBONATE 100 MEQ: 84 INJECTION INTRAVENOUS at 17:14

## 2024-01-01 RX ADMIN — EPINEPHRINE 1 MCG/MIN: 1 INJECTION INTRAMUSCULAR; INTRAVENOUS; SUBCUTANEOUS at 17:56

## 2024-01-01 RX ADMIN — SODIUM CHLORIDE 28 MCG/MIN: 9 INJECTION, SOLUTION INTRAVENOUS at 14:02

## 2024-01-01 RX ADMIN — GENTAMICIN SULFATE: 1 CREAM TOPICAL at 15:49

## 2024-01-01 RX ADMIN — WATER 50 MG: 1 INJECTION INTRAMUSCULAR; INTRAVENOUS; SUBCUTANEOUS at 02:29

## 2024-01-01 RX ADMIN — SODIUM CHLORIDE, SODIUM LACTATE, CALCIUM CHLORIDE, MAGNESIUM CHLORIDE AND DEXTROSE 6000 ML: 2.5; 538; 448; 18.3; 5.08 INJECTION, SOLUTION INTRAPERITONEAL at 15:35

## 2024-01-01 RX ADMIN — ACETAMINOPHEN 1000 MG: 500 TABLET ORAL at 04:49

## 2024-01-01 RX ADMIN — MIDODRINE HYDROCHLORIDE 15 MG: 5 TABLET ORAL at 12:03

## 2024-01-01 RX ADMIN — MIDODRINE HYDROCHLORIDE 15 MG: 5 TABLET ORAL at 16:42

## 2024-01-01 RX ADMIN — SODIUM CHLORIDE, SODIUM LACTATE, CALCIUM CHLORIDE, MAGNESIUM CHLORIDE AND DEXTROSE 6000 ML: 2.5; 538; 448; 18.3; 5.08 INJECTION, SOLUTION INTRAPERITONEAL at 16:13

## 2024-01-01 RX ADMIN — SODIUM CHLORIDE, POTASSIUM CHLORIDE, SODIUM LACTATE AND CALCIUM CHLORIDE 500 ML: 600; 310; 30; 20 INJECTION, SOLUTION INTRAVENOUS at 13:57

## 2024-01-01 RX ADMIN — CALCIUM CHLORIDE, MAGNESIUM CHLORIDE, DEXTROSE MONOHYDRATE, LACTIC ACID, SODIUM CHLORIDE, SODIUM BICARBONATE AND POTASSIUM CHLORIDE: 3.68; 3.05; 22; 5.4; 6.46; 3.09; .314 INJECTION INTRAVENOUS at 02:03

## 2024-01-01 RX ADMIN — ERYTHROMYCIN: 5 OINTMENT OPHTHALMIC at 06:53

## 2024-01-01 RX ADMIN — HEPARIN SODIUM 5000 UNITS: 5000 INJECTION INTRAVENOUS; SUBCUTANEOUS at 22:02

## 2024-01-01 RX ADMIN — ASPIRIN 81 MG CHEWABLE TABLET 81 MG: 81 TABLET CHEWABLE at 12:13

## 2024-01-01 RX ADMIN — GENTAMICIN SULFATE: 1 CREAM TOPICAL at 20:14

## 2024-01-01 RX ADMIN — ERYTHROMYCIN: 5 OINTMENT OPHTHALMIC at 22:10

## 2024-01-01 RX ADMIN — CALCIUM CHLORIDE, MAGNESIUM CHLORIDE, DEXTROSE MONOHYDRATE, LACTIC ACID, SODIUM CHLORIDE, SODIUM BICARBONATE AND POTASSIUM CHLORIDE: 3.68; 3.05; 22; 5.4; 6.46; 3.09; .314 INJECTION INTRAVENOUS at 10:46

## 2024-01-01 RX ADMIN — HEPARIN SODIUM 5000 UNITS: 5000 INJECTION INTRAVENOUS; SUBCUTANEOUS at 21:25

## 2024-01-01 RX ADMIN — CARVEDILOL 6.25 MG: 12.5 TABLET, FILM COATED ORAL at 08:31

## 2024-01-01 RX ADMIN — CALCIUM CHLORIDE, MAGNESIUM CHLORIDE, DEXTROSE MONOHYDRATE, LACTIC ACID, SODIUM CHLORIDE, SODIUM BICARBONATE AND POTASSIUM CHLORIDE: 3.68; 3.05; 22; 5.4; 6.46; 3.09; .314 INJECTION INTRAVENOUS at 23:05

## 2024-01-01 RX ADMIN — ROCURONIUM BROMIDE 100 MG: 50 INJECTION INTRAVENOUS at 17:12

## 2024-01-01 RX ADMIN — WATER 2000 MG: 1 INJECTION INTRAMUSCULAR; INTRAVENOUS; SUBCUTANEOUS at 00:24

## 2024-01-01 RX ADMIN — HEPARIN SODIUM 5000 UNITS: 5000 INJECTION INTRAVENOUS; SUBCUTANEOUS at 13:27

## 2024-01-01 RX ADMIN — SODIUM CHLORIDE 1000 ML: 9 INJECTION, SOLUTION INTRAVENOUS at 17:44

## 2024-01-01 RX ADMIN — POTASSIUM PHOSPHATE, MONOBASIC POTASSIUM PHOSPHATE, DIBASIC 30 MMOL: 224; 236 INJECTION, SOLUTION, CONCENTRATE INTRAVENOUS at 02:29

## 2024-01-01 RX ADMIN — ARFORMOTEROL TARTRATE: 15 SOLUTION RESPIRATORY (INHALATION) at 07:29

## 2024-01-01 RX ADMIN — WATER 50 MG: 1 INJECTION INTRAMUSCULAR; INTRAVENOUS; SUBCUTANEOUS at 21:05

## 2024-01-01 RX ADMIN — ACETAMINOPHEN 650 MG: 325 TABLET ORAL at 06:07

## 2024-01-01 RX ADMIN — CARVEDILOL 6.25 MG: 12.5 TABLET, FILM COATED ORAL at 17:31

## 2024-01-01 RX ADMIN — MIDODRINE HYDROCHLORIDE 10 MG: 5 TABLET ORAL at 12:13

## 2024-01-01 RX ADMIN — CALCIUM CHLORIDE, MAGNESIUM CHLORIDE, DEXTROSE MONOHYDRATE, LACTIC ACID, SODIUM CHLORIDE, SODIUM BICARBONATE AND POTASSIUM CHLORIDE: 3.68; 3.05; 22; 5.4; 6.46; 3.09; .314 INJECTION INTRAVENOUS at 18:05

## 2024-01-01 RX ADMIN — CALCIUM CHLORIDE, MAGNESIUM CHLORIDE, DEXTROSE MONOHYDRATE, LACTIC ACID, SODIUM CHLORIDE, SODIUM BICARBONATE AND POTASSIUM CHLORIDE: 3.68; 3.05; 22; 5.4; 6.46; 3.09; .314 INJECTION INTRAVENOUS at 03:04

## 2024-01-01 RX ADMIN — HEPARIN SODIUM 5000 UNITS: 5000 INJECTION INTRAVENOUS; SUBCUTANEOUS at 10:10

## 2024-01-01 RX ADMIN — SODIUM CHLORIDE, SODIUM LACTATE, CALCIUM CHLORIDE, MAGNESIUM CHLORIDE AND DEXTROSE 6000 ML: 2.5; 538; 448; 18.3; 5.08 INJECTION, SOLUTION INTRAPERITONEAL at 15:34

## 2024-01-01 RX ADMIN — ARFORMOTEROL TARTRATE: 15 SOLUTION RESPIRATORY (INHALATION) at 21:56

## 2024-01-01 RX ADMIN — CALCIUM CHLORIDE, MAGNESIUM CHLORIDE, DEXTROSE MONOHYDRATE, LACTIC ACID, SODIUM CHLORIDE, SODIUM BICARBONATE AND POTASSIUM CHLORIDE: 3.68; 3.05; 22; 5.4; 6.46; 3.09; .314 INJECTION INTRAVENOUS at 06:07

## 2024-01-01 RX ADMIN — SODIUM CHLORIDE, PRESERVATIVE FREE 10 ML: 5 INJECTION INTRAVENOUS at 22:05

## 2024-01-01 RX ADMIN — SODIUM CHLORIDE, SODIUM LACTATE, CALCIUM CHLORIDE, MAGNESIUM CHLORIDE AND DEXTROSE 6000 ML: 1.5; 538; 448; 18.3; 5.08 INJECTION, SOLUTION INTRAPERITONEAL at 15:49

## 2024-01-01 RX ADMIN — VASOPRESSIN 0.03 UNITS/MIN: 20 INJECTION INTRAVENOUS at 13:22

## 2024-01-01 RX ADMIN — SODIUM CHLORIDE, SODIUM LACTATE, CALCIUM CHLORIDE, MAGNESIUM CHLORIDE AND DEXTROSE 6000 ML: 2.5; 538; 448; 18.3; 5.08 INJECTION, SOLUTION INTRAPERITONEAL at 16:16

## 2024-01-01 RX ADMIN — MONTELUKAST 10 MG: 10 TABLET, FILM COATED ORAL at 23:18

## 2024-01-01 RX ADMIN — MONTELUKAST 10 MG: 10 TABLET, FILM COATED ORAL at 20:32

## 2024-01-01 RX ADMIN — TOBRAMYCIN SULFATE 384 MG: 40 INJECTION, SOLUTION INTRAMUSCULAR; INTRAVENOUS at 19:32

## 2024-01-01 RX ADMIN — CARVEDILOL 3.12 MG: 3.12 TABLET, FILM COATED ORAL at 17:21

## 2024-01-01 RX ADMIN — SODIUM CHLORIDE: 9 INJECTION, SOLUTION INTRAVENOUS at 12:46

## 2024-01-01 RX ADMIN — ACETAMINOPHEN 650 MG: 325 TABLET ORAL at 11:40

## 2024-01-01 RX ADMIN — SODIUM CHLORIDE 19 MCG/MIN: 9 INJECTION, SOLUTION INTRAVENOUS at 01:22

## 2024-01-01 RX ADMIN — CALCIUM CHLORIDE, MAGNESIUM CHLORIDE, DEXTROSE MONOHYDRATE, LACTIC ACID, SODIUM CHLORIDE, SODIUM BICARBONATE AND POTASSIUM CHLORIDE: 3.68; 3.05; 22; 5.4; 6.46; 3.09; .314 INJECTION INTRAVENOUS at 13:05

## 2024-01-01 RX ADMIN — HEPARIN SODIUM 5000 UNITS: 5000 INJECTION INTRAVENOUS; SUBCUTANEOUS at 15:32

## 2024-01-01 ASSESSMENT — PAIN DESCRIPTION - FREQUENCY: FREQUENCY: CONTINUOUS

## 2024-01-01 ASSESSMENT — PAIN DESCRIPTION - LOCATION
LOCATION: GENERALIZED
LOCATION: OTHER (COMMENT)
LOCATION: BACK
LOCATION: HEAD
LOCATION: BACK
LOCATION: ABDOMEN
LOCATION: BACK
LOCATION: ABDOMEN
LOCATION: BACK

## 2024-01-01 ASSESSMENT — PAIN SCALES - GENERAL
PAINLEVEL_OUTOF10: 4
PAINLEVEL_OUTOF10: 0
PAINLEVEL_OUTOF10: 0
PAINLEVEL_OUTOF10: 8
PAINLEVEL_OUTOF10: 0
PAINLEVEL_OUTOF10: 4
PAINLEVEL_OUTOF10: 8
PAINLEVEL_OUTOF10: 5
PAINLEVEL_OUTOF10: 8
PAINLEVEL_OUTOF10: 3
PAINLEVEL_OUTOF10: 5
PAINLEVEL_OUTOF10: 0
PAINLEVEL_OUTOF10: 5
PAINLEVEL_OUTOF10: 4
PAINLEVEL_OUTOF10: 9
PAINLEVEL_OUTOF10: 0
PAINLEVEL_OUTOF10: 5
PAINLEVEL_OUTOF10: 4
PAINLEVEL_OUTOF10: 0
PAINLEVEL_OUTOF10: 9
PAINLEVEL_OUTOF10: 6
PAINLEVEL_OUTOF10: 0
PAINLEVEL_OUTOF10: 6
PAINLEVEL_OUTOF10: 0
PAINLEVEL_OUTOF10: 0
PAINLEVEL_OUTOF10: 4
PAINLEVEL_OUTOF10: 3
PAINLEVEL_OUTOF10: 0
PAINLEVEL_OUTOF10: 8
PAINLEVEL_OUTOF10: 0

## 2024-01-01 ASSESSMENT — PULMONARY FUNCTION TESTS
PIF_VALUE: 28
PIF_VALUE: 43
PIF_VALUE: 27
PIF_VALUE: 33
PIF_VALUE: 28
PIF_VALUE: 39
PIF_VALUE: 37
PIF_VALUE: 30
PIF_VALUE: 39
PIF_VALUE: 30
PIF_VALUE: 33
PIF_VALUE: 22

## 2024-01-01 ASSESSMENT — PAIN DESCRIPTION - ORIENTATION
ORIENTATION: RIGHT
ORIENTATION: OTHER (COMMENT)
ORIENTATION: MID
ORIENTATION: MID

## 2024-01-01 ASSESSMENT — PAIN DESCRIPTION - ONSET: ONSET: ON-GOING

## 2024-01-01 ASSESSMENT — PAIN DESCRIPTION - PAIN TYPE
TYPE: CHRONIC PAIN
TYPE: CHRONIC PAIN

## 2024-01-01 ASSESSMENT — PAIN DESCRIPTION - DESCRIPTORS
DESCRIPTORS: ACHING
DESCRIPTORS: ACHING;DISCOMFORT
DESCRIPTORS: ACHING

## 2024-01-01 ASSESSMENT — ENCOUNTER SYMPTOMS
BACK PAIN: 1
ABDOMINAL PAIN: 1
SHORTNESS OF BREATH: 1

## 2024-01-01 ASSESSMENT — PAIN - FUNCTIONAL ASSESSMENT
PAIN_FUNCTIONAL_ASSESSMENT: ACTIVITIES ARE NOT PREVENTED
PAIN_FUNCTIONAL_ASSESSMENT: 0-10

## 2024-01-01 NOTE — HOME HEALTH
Subjective: Patient doing well today and no complaints  Falls since last visit No(if yes complete the Fall Tracking Form and include bsrifallreport):   Caregiver involvement changes: none present to teach  Home health supplies by type and quantity ordered/delivered this visit include: none    Clinician asked if patient has had any physician contact since last home care visit and patient states: NO  Clinician asked if patient has any new or changed medications and patient states:  NO   If Yes, were medications reconciled? N/A   Was the certifying physician notified of changes in medications? NO     Clinical assessment (what this visit means for the patient overall and need for ongoing skilled care) and progress or lack of progress towards SPECIFIC goals: Patient being seen for medication teaching and also diabetes education, no caregiver present during visit to teach about medication since patient is not able to remember any education. Patient has not been checking her blood sugars during the day, checked during visit and able to perform at 100%. Patient stated she gets short of breath sometimes when she lays down in bed, then sits up on the side of the bed and it goes away, educated that if this continues and gets worse needs to go to the ED and get evaluated, patient was able to relay instructions back to sn. Patient medications are maintained by her daughter who was not present during visit. Patient is not progressing toward medication goals due to not able to remember education.    Written Teaching Material Utilized: N/A    Interdisciplinary communication with: N/A     Discharge planning as follows: Per physician order and When goals are met    Specific plan for next visit: SN to educate about medication and also make sure checking blood sugar

## 2024-01-02 ENCOUNTER — HOME CARE VISIT (OUTPATIENT)
Facility: HOME HEALTH | Age: 69
End: 2024-01-02
Payer: MEDICARE

## 2024-01-02 VITALS
HEART RATE: 91 BPM | DIASTOLIC BLOOD PRESSURE: 70 MMHG | BODY MASS INDEX: 26.02 KG/M2 | SYSTOLIC BLOOD PRESSURE: 130 MMHG | WEIGHT: 171.1 LBS | OXYGEN SATURATION: 100 % | TEMPERATURE: 98.1 F | RESPIRATION RATE: 18 BRPM

## 2024-01-02 PROCEDURE — G0151 HHCP-SERV OF PT,EA 15 MIN: HCPCS

## 2024-01-02 NOTE — HOME HEALTH
Subjective: Patient reports she still has SOB mostly at night. States when she is lying down, it is worse but is able to sit up with relief  Falls since last visit No(if yes complete the Fall Tracking Form and include bsrifallreport):   Caregiver involvement changes: None  Home health supplies by type and quantity ordered/delivered this visit include: None    Clinician asked if patient has had any physician contact since last home care visit and patient states: NO  Clinician asked if patient has any new or changed medications and patient states:  NO   If Yes, were medications reconciled? N/A   Was the certifying physician notified of changes in medications? N/A     Clinical assessment (what this visit means for the patient overall and need for ongoing skilled care) and progress or lack of progress towards SPECIFIC goals: Patient demonstrates good balance with balance activities. Will continue to educate. Patient requires continued instruction about ambulation. Gait goal not met.    Written Teaching Material Utilized: exercises    Interdisciplinary communication with: ALEXUS    Discharge planning as follows: When goals are met    Specific plan for next visit: gait and strength

## 2024-01-04 ENCOUNTER — HOME CARE VISIT (OUTPATIENT)
Facility: HOME HEALTH | Age: 69
End: 2024-01-04
Payer: MEDICARE

## 2024-01-04 VITALS
SYSTOLIC BLOOD PRESSURE: 126 MMHG | WEIGHT: 171 LBS | TEMPERATURE: 97.8 F | HEART RATE: 78 BPM | OXYGEN SATURATION: 97 % | DIASTOLIC BLOOD PRESSURE: 60 MMHG | RESPIRATION RATE: 18 BRPM | BODY MASS INDEX: 26 KG/M2

## 2024-01-04 VITALS
BODY MASS INDEX: 26 KG/M2 | TEMPERATURE: 97.8 F | DIASTOLIC BLOOD PRESSURE: 60 MMHG | OXYGEN SATURATION: 97 % | SYSTOLIC BLOOD PRESSURE: 126 MMHG | HEART RATE: 78 BPM | WEIGHT: 171 LBS | RESPIRATION RATE: 18 BRPM

## 2024-01-04 PROCEDURE — G0151 HHCP-SERV OF PT,EA 15 MIN: HCPCS

## 2024-01-04 PROCEDURE — G0300 HHS/HOSPICE OF LPN EA 15 MIN: HCPCS

## 2024-01-04 ASSESSMENT — ENCOUNTER SYMPTOMS: STOOL DESCRIPTION: SOFT FORMED

## 2024-01-04 NOTE — HOME HEALTH
Subjective: Patient states she missed her MD appointment yesterday d/t using the bathroom all day. Will call to reschedule  Falls since last visit No(if yes complete the Fall Tracking Form and include bsrifallreport):   Caregiver involvement changes: None  Home health supplies by type and quantity ordered/delivered this visit include: None    Clinician asked if patient has had any physician contact since last home care visit and patient states: NO  Clinician asked if patient has any new or changed medications and patient states:  NO   If Yes, were medications reconciled? N/A   Was the certifying physician notified of changes in medications? N/A     Clinical assessment (what this visit means for the patient overall and need for ongoing skilled care) and progress or lack of progress towards SPECIFIC goals: Patient received 7 visits of skilled PT to address impairments from CVA. She was educated on HEP and safe mobility within the home, which she is independent with these activities. Her balance improved with a Tinetti score of 20/28. She did not meet her strength goal as noted with sit <> stand of 23 seconds but MMT indicated 4/5 for all mvmts. She is appropriate for discharge from home health PT at this time.    Written Teaching Material Utilized: discharge instructions    Interdisciplinary communication with: Summer PATIÑO and SN Thony for the purpose of POC collaboration    Discharge planning as follows: today

## 2024-01-05 NOTE — HOME HEALTH
Subjective: Patient doing okay and have been weighing herself prior to periotineal dialysis  Falls since last visit No(if yes complete the Fall Tracking Form and include bsrifallreport):   Caregiver involvement changes: none present  Home health supplies by type and quantity ordered/delivered this visit include: none    Clinician asked if patient has had any physician contact since last home care visit and patient states: NO  Clinician asked if patient has any new or changed medications and patient states:  NO   If Yes, were medications reconciled? N/A   Was the certifying physician notified of changes in medications? NO     Clinical assessment (what this visit means for the patient overall and need for ongoing skilled care) and progress or lack of progress towards SPECIFIC goals: Patient being seen for medication teaching and also diabetes education, no caregiver present during visit to teach about medication, patient does understand about some of her medication including insulin and blood thinners and can teach back at 100%. Patient stated she does get short of breath but not as often educated about purse lip breathing and sitting down and resting and if gets worse to get seen by MD, patient was able to teach back at 100%. Sent message to RN seeing the patient next week about discharge due to no skill and patient goals have been met.    Written Teaching Material Utilized: N/A    Interdisciplinary communication with: N/A     Discharge planning as follows: Per physician order and When goals are met    Specific plan for next visit: SN to prepare for discharge

## 2024-01-12 ENCOUNTER — HOME CARE VISIT (OUTPATIENT)
Facility: HOME HEALTH | Age: 69
End: 2024-01-12
Payer: MEDICARE

## 2024-01-15 ENCOUNTER — HOME CARE VISIT (OUTPATIENT)
Facility: HOME HEALTH | Age: 69
End: 2024-01-15

## 2024-01-15 VITALS
TEMPERATURE: 97.9 F | SYSTOLIC BLOOD PRESSURE: 80 MMHG | OXYGEN SATURATION: 99 % | DIASTOLIC BLOOD PRESSURE: 50 MMHG | HEART RATE: 68 BPM

## 2024-01-15 PROCEDURE — G0299 HHS/HOSPICE OF RN EA 15 MIN: HCPCS

## 2024-01-16 NOTE — HOME HEALTH
Subjective: Pt states she had pain in her butt and her vaginal area last night,  she had diarrhea and her stomach was tender to touch. Pt also complained of dizziness. Pt states she no longer has pain, and diarrhea has stopped.  Falls since last visit No(if yes complete the Fall Tracking Form and include bsrifallreport):   Caregiver involvement changes: na  Home health supplies by type and quantity ordered/delivered this visit include: na    Clinician asked if patient has had any physician contact since last home care visit and patient states: NO  Clinician asked if patient has any new or changed medications and patient states:  NO   If Yes, were medications reconciled? N/A   Was the certifying physician notified of changes in medications? N/A     Clinical assessment (what this visit means for the patient overall and need for ongoing skilled care) and progress or lack of progress towards SPECIFIC goals: Pt daughter and son informed SN that pt had not been feeling well since last night. During this visit pt BP was low, bp meds had been taken today,and  pt complained of dizziness. Pt does not check bp before taking bp medication. Pt denied pain, stated she used z guard on her butt and that took away the pain. SN educated pt that z guard is a skin protectant to prevent skin irritation typically from urine/feces. SN spoke with Tammie-Nurse at [facility] during Sn visit, she stated pt potassium is low and bp is low due to pt taking too much fluid off during PD dialysis. Pt has appt. tomorrow with dialysis physician. SN instructed pt and cg to eat a meal, pt had not eaten at time of visit. If symptoms become worse or do not improve to go to ER. SN did not discharge pt this visit due to current symptoms and further education on bp meds.    Written Teaching Material Utilized: N/A    Interdisciplinary communication with:  Physician for the purpose of low bp    Discharge planning as follows: When goals are met    Specific

## 2024-01-22 ENCOUNTER — HOME CARE VISIT (OUTPATIENT)
Facility: HOME HEALTH | Age: 69
End: 2024-01-22
Payer: MEDICARE

## 2024-01-22 PROCEDURE — G0299 HHS/HOSPICE OF RN EA 15 MIN: HCPCS

## 2024-01-23 ENCOUNTER — TELEPHONE (OUTPATIENT)
Age: 69
End: 2024-01-23

## 2024-01-23 VITALS
RESPIRATION RATE: 18 BRPM | TEMPERATURE: 97.3 F | OXYGEN SATURATION: 100 % | SYSTOLIC BLOOD PRESSURE: 98 MMHG | HEART RATE: 75 BPM | DIASTOLIC BLOOD PRESSURE: 68 MMHG

## 2024-01-23 ASSESSMENT — ENCOUNTER SYMPTOMS: DYSPNEA ACTIVITY LEVEL: AFTER AMBULATING LESS THAN 10 FT

## 2024-01-23 NOTE — TELEPHONE ENCOUNTER
----- Message from Helen Warren MD sent at 1/23/2024 12:00 PM EST -----  Please call to let patient know that the monitor was negative for Afib. Please arrange for FU with NP for discussion of ILR implantation for cryptogenic CVA. Thanks.       Called home #, no answer. LM to have call returned to [phone]. Need to inform patient of above and schedule appointment with NP for f/u.

## 2024-01-23 NOTE — HOME HEALTH
Subjective: Pt states she has had a sharp pain when going to the bathroom to have a BM, sometimes she has the pain when not going to the bathroom as well. Pt does go away instantly. Pt denies constipation or straining to have BM. Pt states she feels fatigued today and did not sleep well.  Falls since last visit No(if yes complete the Fall Tracking Form and include bsrifallreport):   Caregiver involvement changes: na  Home health supplies by type and quantity ordered/delivered this visit include: na    Clinician asked if patient has had any physician contact since last home care visit and patient states: NO  Clinician asked if patient has any new or changed medications and patient states:  NO   If Yes, were medications reconciled? N/A   Was the certifying physician notified of changes in medications? N/A     Clinical assessment (what this visit means for the patient overall and need for ongoing skilled care) and progress or lack of progress towards SPECIFIC goals: Continued SN needed to meet CVA lifestyle goals.     Written Teaching Material Utilized: N/A    Interdisciplinary communication with: N/A for the purpose of na    Discharge planning as follows: When goals are met    Specific plan for next visit: Re-instruct patient/caregiver on inhaler, cva lifestyle goals, fall prevention strategies

## 2024-01-25 ENCOUNTER — HOSPITAL ENCOUNTER (INPATIENT)
Facility: HOSPITAL | Age: 69
LOS: 10 days | Discharge: HOME HEALTH | DRG: 919 | End: 2024-02-05
Attending: STUDENT IN AN ORGANIZED HEALTH CARE EDUCATION/TRAINING PROGRAM | Admitting: FAMILY MEDICINE
Payer: MEDICARE

## 2024-01-25 ENCOUNTER — APPOINTMENT (OUTPATIENT)
Facility: HOSPITAL | Age: 69
DRG: 919 | End: 2024-01-25
Payer: MEDICARE

## 2024-01-25 DIAGNOSIS — E87.6 HYPOKALEMIA: ICD-10-CM

## 2024-01-25 DIAGNOSIS — A41.9 SEVERE SEPSIS (HCC): Primary | ICD-10-CM

## 2024-01-25 DIAGNOSIS — R10.84 GENERALIZED ABDOMINAL PAIN: ICD-10-CM

## 2024-01-25 DIAGNOSIS — N18.6 ESRD ON DIALYSIS (HCC): ICD-10-CM

## 2024-01-25 DIAGNOSIS — K65.9 PERITONITIS (HCC): ICD-10-CM

## 2024-01-25 DIAGNOSIS — Z99.2 ESRD ON DIALYSIS (HCC): ICD-10-CM

## 2024-01-25 DIAGNOSIS — R65.20 SEVERE SEPSIS (HCC): Primary | ICD-10-CM

## 2024-01-25 DIAGNOSIS — E83.42 HYPOMAGNESEMIA: ICD-10-CM

## 2024-01-25 LAB
ALBUMIN SERPL-MCNC: 1.7 G/DL (ref 3.5–5)
ALBUMIN/GLOB SERPL: 0.2 (ref 1.1–2.2)
ALP SERPL-CCNC: 126 U/L (ref 45–117)
ALT SERPL-CCNC: 8 U/L (ref 12–78)
ANION GAP SERPL CALC-SCNC: 12 MMOL/L (ref 5–15)
AST SERPL-CCNC: 13 U/L (ref 15–37)
BASOPHILS # BLD: 0.1 K/UL (ref 0–0.1)
BASOPHILS NFR BLD: 1 % (ref 0–1)
BILIRUB SERPL-MCNC: 0.5 MG/DL (ref 0.2–1)
BUN SERPL-MCNC: 18 MG/DL (ref 6–20)
BUN/CREAT SERPL: 3 (ref 12–20)
CALCIUM SERPL-MCNC: 8 MG/DL (ref 8.5–10.1)
CHLORIDE SERPL-SCNC: 95 MMOL/L (ref 97–108)
CO2 SERPL-SCNC: 27 MMOL/L (ref 21–32)
COMMENT:: NORMAL
CREAT SERPL-MCNC: 6.56 MG/DL (ref 0.55–1.02)
DIFFERENTIAL METHOD BLD: ABNORMAL
EOSINOPHIL # BLD: 0.1 K/UL (ref 0–0.4)
EOSINOPHIL NFR BLD: 1 % (ref 0–7)
ERYTHROCYTE [DISTWIDTH] IN BLOOD BY AUTOMATED COUNT: 15.4 % (ref 11.5–14.5)
GLOBULIN SER CALC-MCNC: 7.1 G/DL (ref 2–4)
GLUCOSE SERPL-MCNC: 172 MG/DL (ref 65–100)
HCT VFR BLD AUTO: 29.7 % (ref 35–47)
HGB BLD-MCNC: 9.4 G/DL (ref 11.5–16)
IMM GRANULOCYTES # BLD AUTO: 0 K/UL (ref 0–0.04)
IMM GRANULOCYTES NFR BLD AUTO: 0 % (ref 0–0.5)
LACTATE SERPL-SCNC: 4.2 MMOL/L (ref 0.4–2)
LYMPHOCYTES # BLD: 1.5 K/UL (ref 0.8–3.5)
LYMPHOCYTES NFR BLD: 15 % (ref 12–49)
MCH RBC QN AUTO: 28.7 PG (ref 26–34)
MCHC RBC AUTO-ENTMCNC: 31.6 G/DL (ref 30–36.5)
MCV RBC AUTO: 90.8 FL (ref 80–99)
MONOCYTES # BLD: 0.3 K/UL (ref 0–1)
MONOCYTES NFR BLD: 3 % (ref 5–13)
NEUTS SEG # BLD: 7.8 K/UL (ref 1.8–8)
NEUTS SEG NFR BLD: 80 % (ref 32–75)
NRBC # BLD: 0 K/UL (ref 0–0.01)
NRBC BLD-RTO: 0 PER 100 WBC
PLATELET # BLD AUTO: 348 K/UL (ref 150–400)
PMV BLD AUTO: 10.2 FL (ref 8.9–12.9)
POTASSIUM SERPL-SCNC: 2.5 MMOL/L (ref 3.5–5.1)
PROCALCITONIN SERPL-MCNC: 0.25 NG/ML
PROT SERPL-MCNC: 8.8 G/DL (ref 6.4–8.2)
RBC # BLD AUTO: 3.27 M/UL (ref 3.8–5.2)
SODIUM SERPL-SCNC: 134 MMOL/L (ref 136–145)
SPECIMEN HOLD: NORMAL
TROPONIN I SERPL HS-MCNC: 6 NG/L (ref 0–51)
WBC # BLD AUTO: 9.7 K/UL (ref 3.6–11)

## 2024-01-25 PROCEDURE — 93005 ELECTROCARDIOGRAM TRACING: CPT | Performed by: PHYSICIAN ASSISTANT

## 2024-01-25 PROCEDURE — 71046 X-RAY EXAM CHEST 2 VIEWS: CPT

## 2024-01-25 PROCEDURE — 87804 INFLUENZA ASSAY W/OPTIC: CPT

## 2024-01-25 PROCEDURE — 80053 COMPREHEN METABOLIC PANEL: CPT

## 2024-01-25 PROCEDURE — 96366 THER/PROPH/DIAG IV INF ADDON: CPT

## 2024-01-25 PROCEDURE — 74176 CT ABD & PELVIS W/O CONTRAST: CPT

## 2024-01-25 PROCEDURE — 87635 SARS-COV-2 COVID-19 AMP PRB: CPT

## 2024-01-25 PROCEDURE — 83735 ASSAY OF MAGNESIUM: CPT

## 2024-01-25 PROCEDURE — 87154 CUL TYP ID BLD PTHGN 6+ TRGT: CPT

## 2024-01-25 PROCEDURE — 96367 TX/PROPH/DG ADDL SEQ IV INF: CPT

## 2024-01-25 PROCEDURE — 85025 COMPLETE CBC W/AUTO DIFF WBC: CPT

## 2024-01-25 PROCEDURE — 84145 PROCALCITONIN (PCT): CPT

## 2024-01-25 PROCEDURE — 99285 EMERGENCY DEPT VISIT HI MDM: CPT

## 2024-01-25 PROCEDURE — 96365 THER/PROPH/DIAG IV INF INIT: CPT

## 2024-01-25 PROCEDURE — 83605 ASSAY OF LACTIC ACID: CPT

## 2024-01-25 PROCEDURE — 87040 BLOOD CULTURE FOR BACTERIA: CPT

## 2024-01-25 PROCEDURE — 84484 ASSAY OF TROPONIN QUANT: CPT

## 2024-01-25 PROCEDURE — 36415 COLL VENOUS BLD VENIPUNCTURE: CPT

## 2024-01-25 PROCEDURE — 96368 THER/DIAG CONCURRENT INF: CPT

## 2024-01-25 RX ORDER — 0.9 % SODIUM CHLORIDE 0.9 %
1000 INTRAVENOUS SOLUTION INTRAVENOUS ONCE
Status: COMPLETED | OUTPATIENT
Start: 2024-01-26 | End: 2024-01-26

## 2024-01-25 RX ORDER — 0.9 % SODIUM CHLORIDE 0.9 %
500 INTRAVENOUS SOLUTION INTRAVENOUS ONCE
Status: DISCONTINUED | OUTPATIENT
Start: 2024-01-25 | End: 2024-01-25

## 2024-01-25 ASSESSMENT — PAIN - FUNCTIONAL ASSESSMENT: PAIN_FUNCTIONAL_ASSESSMENT: 0-10

## 2024-01-25 ASSESSMENT — PAIN DESCRIPTION - DESCRIPTORS: DESCRIPTORS: ACHING

## 2024-01-25 ASSESSMENT — PAIN DESCRIPTION - PAIN TYPE: TYPE: ACUTE PAIN

## 2024-01-25 ASSESSMENT — PAIN SCALES - GENERAL: PAINLEVEL_OUTOF10: 7

## 2024-01-25 ASSESSMENT — PAIN DESCRIPTION - LOCATION: LOCATION: CHEST;GENERALIZED

## 2024-01-26 LAB
APPEARANCE FLD: ABNORMAL
COLOR FLD: COLORLESS
COMMENT:: NORMAL
EKG ATRIAL RATE: 79 BPM
EKG DIAGNOSIS: NORMAL
EKG P AXIS: 58 DEGREES
EKG P-R INTERVAL: 174 MS
EKG Q-T INTERVAL: 454 MS
EKG QRS DURATION: 96 MS
EKG QTC CALCULATION (BAZETT): 520 MS
EKG R AXIS: -53 DEGREES
EKG T AXIS: 107 DEGREES
EKG VENTRICULAR RATE: 79 BPM
FLUAV AG NPH QL IA: NEGATIVE
FLUBV AG NOSE QL IA: NEGATIVE
GLUCOSE BLD STRIP.AUTO-MCNC: 174 MG/DL (ref 65–117)
LACTATE SERPL-SCNC: 1.4 MMOL/L (ref 0.4–2)
LACTATE SERPL-SCNC: 2.4 MMOL/L (ref 0.4–2)
LYMPHOCYTES NFR FLD: 5 %
MAGNESIUM SERPL-MCNC: 1.4 MG/DL (ref 1.6–2.4)
MONOS+MACROS NFR FLD: 12 %
NEUTROPHILS NFR FLD: 83 %
NUC CELL # FLD: 4594 /CU MM
RBC # FLD: 12 /CU MM
SARS-COV-2 RDRP RESP QL NAA+PROBE: NOT DETECTED
SERVICE CMNT-IMP: ABNORMAL
SOURCE: NORMAL
SPECIMEN HOLD: NORMAL
SPECIMEN SOURCE FLD: ABNORMAL

## 2024-01-26 PROCEDURE — 6360000002 HC RX W HCPCS: Performed by: INTERNAL MEDICINE

## 2024-01-26 PROCEDURE — 6370000000 HC RX 637 (ALT 250 FOR IP): Performed by: NURSE PRACTITIONER

## 2024-01-26 PROCEDURE — 6360000002 HC RX W HCPCS: Performed by: NURSE PRACTITIONER

## 2024-01-26 PROCEDURE — 2580000003 HC RX 258: Performed by: NURSE PRACTITIONER

## 2024-01-26 PROCEDURE — 5A1D70Z PERFORMANCE OF URINARY FILTRATION, INTERMITTENT, LESS THAN 6 HOURS PER DAY: ICD-10-PCS | Performed by: INTERNAL MEDICINE

## 2024-01-26 PROCEDURE — 2580000003 HC RX 258: Performed by: INTERNAL MEDICINE

## 2024-01-26 PROCEDURE — 3E1M39Z IRRIGATION OF PERITONEAL CAVITY USING DIALYSATE, PERCUTANEOUS APPROACH: ICD-10-PCS | Performed by: INTERNAL MEDICINE

## 2024-01-26 PROCEDURE — 93010 ELECTROCARDIOGRAM REPORT: CPT | Performed by: INTERNAL MEDICINE

## 2024-01-26 PROCEDURE — 90945 DIALYSIS ONE EVALUATION: CPT

## 2024-01-26 PROCEDURE — 87070 CULTURE OTHR SPECIMN AEROBIC: CPT

## 2024-01-26 PROCEDURE — 2580000003 HC RX 258: Performed by: STUDENT IN AN ORGANIZED HEALTH CARE EDUCATION/TRAINING PROGRAM

## 2024-01-26 PROCEDURE — 82962 GLUCOSE BLOOD TEST: CPT

## 2024-01-26 PROCEDURE — 6370000000 HC RX 637 (ALT 250 FOR IP): Performed by: INTERNAL MEDICINE

## 2024-01-26 PROCEDURE — 36415 COLL VENOUS BLD VENIPUNCTURE: CPT

## 2024-01-26 PROCEDURE — 83605 ASSAY OF LACTIC ACID: CPT

## 2024-01-26 PROCEDURE — 6370000000 HC RX 637 (ALT 250 FOR IP): Performed by: STUDENT IN AN ORGANIZED HEALTH CARE EDUCATION/TRAINING PROGRAM

## 2024-01-26 PROCEDURE — 94640 AIRWAY INHALATION TREATMENT: CPT

## 2024-01-26 PROCEDURE — 87077 CULTURE AEROBIC IDENTIFY: CPT

## 2024-01-26 PROCEDURE — 6360000002 HC RX W HCPCS: Performed by: STUDENT IN AN ORGANIZED HEALTH CARE EDUCATION/TRAINING PROGRAM

## 2024-01-26 PROCEDURE — 87205 SMEAR GRAM STAIN: CPT

## 2024-01-26 PROCEDURE — 89050 BODY FLUID CELL COUNT: CPT

## 2024-01-26 PROCEDURE — 2060000000 HC ICU INTERMEDIATE R&B

## 2024-01-26 PROCEDURE — P9045 ALBUMIN (HUMAN), 5%, 250 ML: HCPCS | Performed by: INTERNAL MEDICINE

## 2024-01-26 PROCEDURE — 87186 SC STD MICRODIL/AGAR DIL: CPT

## 2024-01-26 RX ORDER — ASPIRIN 81 MG/1
81 TABLET, CHEWABLE ORAL DAILY
Status: DISCONTINUED | OUTPATIENT
Start: 2024-01-26 | End: 2024-02-05 | Stop reason: HOSPADM

## 2024-01-26 RX ORDER — SODIUM CHLORIDE, SODIUM LACTATE, CALCIUM CHLORIDE, MAGNESIUM CHLORIDE AND DEXTROSE 2.5; 538; 448; 18.3; 5.08 G/100ML; MG/100ML; MG/100ML; MG/100ML; MG/100ML
6000 INJECTION, SOLUTION INTRAPERITONEAL EVERY 6 HOURS
Status: DISCONTINUED | OUTPATIENT
Start: 2024-01-26 | End: 2024-01-27

## 2024-01-26 RX ORDER — SODIUM CHLORIDE 9 MG/ML
INJECTION, SOLUTION INTRAVENOUS PRN
Status: DISCONTINUED | OUTPATIENT
Start: 2024-01-26 | End: 2024-02-05 | Stop reason: HOSPADM

## 2024-01-26 RX ORDER — ACETAMINOPHEN 325 MG/1
650 TABLET ORAL EVERY 6 HOURS PRN
Status: DISCONTINUED | OUTPATIENT
Start: 2024-01-26 | End: 2024-02-05 | Stop reason: HOSPADM

## 2024-01-26 RX ORDER — 0.9 % SODIUM CHLORIDE 0.9 %
500 INTRAVENOUS SOLUTION INTRAVENOUS ONCE
Status: COMPLETED | OUTPATIENT
Start: 2024-01-26 | End: 2024-01-26

## 2024-01-26 RX ORDER — CARVEDILOL 3.12 MG/1
6.25 TABLET ORAL 2 TIMES DAILY WITH MEALS
Status: DISCONTINUED | OUTPATIENT
Start: 2024-01-26 | End: 2024-02-05 | Stop reason: HOSPADM

## 2024-01-26 RX ORDER — ALBUMIN, HUMAN INJ 5% 5 %
25 SOLUTION INTRAVENOUS ONCE
Status: COMPLETED | OUTPATIENT
Start: 2024-01-26 | End: 2024-01-26

## 2024-01-26 RX ORDER — ACETAMINOPHEN 650 MG/1
650 SUPPOSITORY RECTAL EVERY 6 HOURS PRN
Status: DISCONTINUED | OUTPATIENT
Start: 2024-01-26 | End: 2024-02-05 | Stop reason: HOSPADM

## 2024-01-26 RX ORDER — FLUCONAZOLE 2 MG/ML
200 INJECTION, SOLUTION INTRAVENOUS EVERY 24 HOURS
Status: DISCONTINUED | OUTPATIENT
Start: 2024-01-26 | End: 2024-01-29

## 2024-01-26 RX ORDER — MAGNESIUM SULFATE IN WATER 40 MG/ML
2000 INJECTION, SOLUTION INTRAVENOUS
Status: COMPLETED | OUTPATIENT
Start: 2024-01-26 | End: 2024-01-26

## 2024-01-26 RX ORDER — OXYCODONE HYDROCHLORIDE 5 MG/1
5 TABLET ORAL EVERY 4 HOURS PRN
Status: DISCONTINUED | OUTPATIENT
Start: 2024-01-26 | End: 2024-02-05 | Stop reason: HOSPADM

## 2024-01-26 RX ORDER — MONTELUKAST SODIUM 10 MG/1
10 TABLET ORAL NIGHTLY
Status: DISCONTINUED | OUTPATIENT
Start: 2024-01-26 | End: 2024-02-05 | Stop reason: HOSPADM

## 2024-01-26 RX ORDER — INSULIN GLARGINE 100 [IU]/ML
5 INJECTION, SOLUTION SUBCUTANEOUS NIGHTLY
Status: DISCONTINUED | OUTPATIENT
Start: 2024-01-26 | End: 2024-01-28

## 2024-01-26 RX ORDER — FOLIC ACID 1 MG/1
1 TABLET ORAL DAILY
Status: DISCONTINUED | OUTPATIENT
Start: 2024-01-26 | End: 2024-02-05 | Stop reason: HOSPADM

## 2024-01-26 RX ORDER — SODIUM CHLORIDE 0.9 % (FLUSH) 0.9 %
5-40 SYRINGE (ML) INJECTION PRN
Status: DISCONTINUED | OUTPATIENT
Start: 2024-01-26 | End: 2024-02-05 | Stop reason: HOSPADM

## 2024-01-26 RX ORDER — DULOXETIN HYDROCHLORIDE 20 MG/1
20 CAPSULE, DELAYED RELEASE ORAL 2 TIMES DAILY
Status: DISCONTINUED | OUTPATIENT
Start: 2024-01-26 | End: 2024-02-05 | Stop reason: HOSPADM

## 2024-01-26 RX ORDER — SODIUM CHLORIDE AND POTASSIUM CHLORIDE 300; 900 MG/100ML; MG/100ML
INJECTION, SOLUTION INTRAVENOUS CONTINUOUS
Status: DISPENSED | OUTPATIENT
Start: 2024-01-26 | End: 2024-01-27

## 2024-01-26 RX ORDER — POTASSIUM CHLORIDE 750 MG/1
40 TABLET, FILM COATED, EXTENDED RELEASE ORAL ONCE
Status: COMPLETED | OUTPATIENT
Start: 2024-01-26 | End: 2024-01-26

## 2024-01-26 RX ORDER — SODIUM CHLORIDE 0.9 % (FLUSH) 0.9 %
5-40 SYRINGE (ML) INJECTION EVERY 12 HOURS SCHEDULED
Status: DISCONTINUED | OUTPATIENT
Start: 2024-01-26 | End: 2024-02-05 | Stop reason: HOSPADM

## 2024-01-26 RX ORDER — SODIUM CHLORIDE, SODIUM LACTATE, CALCIUM CHLORIDE, MAGNESIUM CHLORIDE AND DEXTROSE 2.5; 538; 448; 18.3; 5.08 G/100ML; MG/100ML; MG/100ML; MG/100ML; MG/100ML
2000 INJECTION, SOLUTION INTRAPERITONEAL 2 TIMES DAILY PRN
Status: COMPLETED | OUTPATIENT
Start: 2024-01-26 | End: 2024-01-26

## 2024-01-26 RX ORDER — ONDANSETRON 2 MG/ML
4 INJECTION INTRAMUSCULAR; INTRAVENOUS EVERY 6 HOURS PRN
Status: DISCONTINUED | OUTPATIENT
Start: 2024-01-26 | End: 2024-02-05 | Stop reason: HOSPADM

## 2024-01-26 RX ORDER — POTASSIUM CHLORIDE 7.45 MG/ML
10 INJECTION INTRAVENOUS
Status: COMPLETED | OUTPATIENT
Start: 2024-01-26 | End: 2024-01-26

## 2024-01-26 RX ORDER — ROSUVASTATIN CALCIUM 40 MG/1
40 TABLET, COATED ORAL DAILY
Status: DISCONTINUED | OUTPATIENT
Start: 2024-01-26 | End: 2024-01-27

## 2024-01-26 RX ORDER — LANOLIN ALCOHOL/MO/W.PET/CERES
6 CREAM (GRAM) TOPICAL NIGHTLY PRN
Status: DISCONTINUED | OUTPATIENT
Start: 2024-01-26 | End: 2024-02-05 | Stop reason: HOSPADM

## 2024-01-26 RX ORDER — GENTAMICIN SULFATE 1 MG/G
CREAM TOPICAL AS NEEDED
Status: DISCONTINUED | OUTPATIENT
Start: 2024-01-26 | End: 2024-02-05 | Stop reason: HOSPADM

## 2024-01-26 RX ADMIN — POTASSIUM CHLORIDE AND SODIUM CHLORIDE: 900; 300 INJECTION, SOLUTION INTRAVENOUS at 21:40

## 2024-01-26 RX ADMIN — SODIUM CHLORIDE 1000 ML: 9 INJECTION, SOLUTION INTRAVENOUS at 00:19

## 2024-01-26 RX ADMIN — ALBUMIN (HUMAN) 25 G: 12.5 INJECTION, SOLUTION INTRAVENOUS at 07:28

## 2024-01-26 RX ADMIN — POTASSIUM BICARBONATE 40 MEQ: 782 TABLET, EFFERVESCENT ORAL at 14:09

## 2024-01-26 RX ADMIN — FLUCONAZOLE IN SODIUM CHLORIDE 200 MG: 2 INJECTION, SOLUTION INTRAVENOUS at 03:39

## 2024-01-26 RX ADMIN — ASPIRIN 81 MG CHEWABLE TABLET 81 MG: 81 TABLET CHEWABLE at 08:22

## 2024-01-26 RX ADMIN — DULOXETINE HYDROCHLORIDE 20 MG: 20 CAPSULE, DELAYED RELEASE ORAL at 21:16

## 2024-01-26 RX ADMIN — POTASSIUM CHLORIDE 10 MEQ: 7.46 INJECTION, SOLUTION INTRAVENOUS at 01:48

## 2024-01-26 RX ADMIN — CARVEDILOL 6.25 MG: 3.12 TABLET, FILM COATED ORAL at 20:08

## 2024-01-26 RX ADMIN — SODIUM CHLORIDE 500 ML: 9 INJECTION, SOLUTION INTRAVENOUS at 04:07

## 2024-01-26 RX ADMIN — POTASSIUM CHLORIDE 10 MEQ: 7.46 INJECTION, SOLUTION INTRAVENOUS at 02:51

## 2024-01-26 RX ADMIN — SODIUM CHLORIDE, SODIUM LACTATE, CALCIUM CHLORIDE, MAGNESIUM CHLORIDE AND DEXTROSE 6000 ML: 2.5; 538; 448; 18.3; 5.08 INJECTION, SOLUTION INTRAPERITONEAL at 21:55

## 2024-01-26 RX ADMIN — CEFEPIME: 1 INJECTION, POWDER, FOR SOLUTION INTRAMUSCULAR; INTRAVENOUS at 12:52

## 2024-01-26 RX ADMIN — OXYCODONE HYDROCHLORIDE 5 MG: 5 TABLET ORAL at 00:41

## 2024-01-26 RX ADMIN — SODIUM CHLORIDE, SODIUM LACTATE, CALCIUM CHLORIDE, MAGNESIUM CHLORIDE AND DEXTROSE 2000 ML: 2.5; 538; 448; 18.3; 5.08 INJECTION, SOLUTION INTRAPERITONEAL at 11:31

## 2024-01-26 RX ADMIN — MAGNESIUM SULFATE HEPTAHYDRATE 2000 MG: 40 INJECTION, SOLUTION INTRAVENOUS at 03:37

## 2024-01-26 RX ADMIN — CARVEDILOL 6.25 MG: 3.12 TABLET, FILM COATED ORAL at 10:34

## 2024-01-26 RX ADMIN — POTASSIUM CHLORIDE 10 MEQ: 7.46 INJECTION, SOLUTION INTRAVENOUS at 03:57

## 2024-01-26 RX ADMIN — MONTELUKAST 10 MG: 10 TABLET, FILM COATED ORAL at 21:16

## 2024-01-26 RX ADMIN — DULOXETINE HYDROCHLORIDE 20 MG: 20 CAPSULE, DELAYED RELEASE ORAL at 08:22

## 2024-01-26 RX ADMIN — POTASSIUM BICARBONATE 40 MEQ: 782 TABLET, EFFERVESCENT ORAL at 10:35

## 2024-01-26 RX ADMIN — FOLIC ACID 1 MG: 1 TABLET ORAL at 08:22

## 2024-01-26 RX ADMIN — ONDANSETRON 4 MG: 2 INJECTION INTRAMUSCULAR; INTRAVENOUS at 10:34

## 2024-01-26 RX ADMIN — ARFORMOTEROL TARTRATE: 15 SOLUTION RESPIRATORY (INHALATION) at 22:04

## 2024-01-26 RX ADMIN — POTASSIUM CHLORIDE 10 MEQ: 7.46 INJECTION, SOLUTION INTRAVENOUS at 00:41

## 2024-01-26 RX ADMIN — SODIUM CHLORIDE, PRESERVATIVE FREE 10 ML: 5 INJECTION INTRAVENOUS at 20:56

## 2024-01-26 RX ADMIN — INSULIN GLARGINE 5 UNITS: 100 INJECTION, SOLUTION SUBCUTANEOUS at 21:32

## 2024-01-26 RX ADMIN — POTASSIUM BICARBONATE 40 MEQ: 782 TABLET, EFFERVESCENT ORAL at 08:22

## 2024-01-26 RX ADMIN — VANCOMYCIN HYDROCHLORIDE 1750 MG: 10 INJECTION, POWDER, LYOPHILIZED, FOR SOLUTION INTRAVENOUS at 01:10

## 2024-01-26 RX ADMIN — WATER 2000 MG: 1 INJECTION INTRAMUSCULAR; INTRAVENOUS; SUBCUTANEOUS at 00:19

## 2024-01-26 RX ADMIN — APIXABAN 2.5 MG: 5 TABLET, FILM COATED ORAL at 08:21

## 2024-01-26 RX ADMIN — ONDANSETRON 4 MG: 2 INJECTION INTRAMUSCULAR; INTRAVENOUS at 20:55

## 2024-01-26 RX ADMIN — ROSUVASTATIN 40 MG: 40 TABLET, FILM COATED ORAL at 08:22

## 2024-01-26 RX ADMIN — SODIUM CHLORIDE, SODIUM LACTATE, CALCIUM CHLORIDE, MAGNESIUM CHLORIDE AND DEXTROSE 2000 ML: 2.5; 538; 448; 18.3; 5.08 INJECTION, SOLUTION INTRAPERITONEAL at 09:51

## 2024-01-26 RX ADMIN — APIXABAN 2.5 MG: 5 TABLET, FILM COATED ORAL at 21:16

## 2024-01-26 RX ADMIN — POTASSIUM CHLORIDE AND SODIUM CHLORIDE: 900; 300 INJECTION, SOLUTION INTRAVENOUS at 07:32

## 2024-01-26 RX ADMIN — OXYCODONE HYDROCHLORIDE 5 MG: 5 TABLET ORAL at 21:16

## 2024-01-26 RX ADMIN — OXYCODONE HYDROCHLORIDE 5 MG: 5 TABLET ORAL at 08:22

## 2024-01-26 RX ADMIN — POTASSIUM CHLORIDE 40 MEQ: 750 TABLET, FILM COATED, EXTENDED RELEASE ORAL at 00:41

## 2024-01-26 RX ADMIN — ALBUMIN (HUMAN) 25 G: 12.5 INJECTION, SOLUTION INTRAVENOUS at 15:15

## 2024-01-26 ASSESSMENT — PAIN DESCRIPTION - PAIN TYPE: TYPE: ACUTE PAIN

## 2024-01-26 ASSESSMENT — PAIN DESCRIPTION - FREQUENCY: FREQUENCY: INTERMITTENT

## 2024-01-26 ASSESSMENT — PAIN DESCRIPTION - ORIENTATION: ORIENTATION: LEFT

## 2024-01-26 ASSESSMENT — PAIN DESCRIPTION - LOCATION: LOCATION: BACK;FLANK

## 2024-01-26 ASSESSMENT — PAIN SCALES - GENERAL
PAINLEVEL_OUTOF10: 8
PAINLEVEL_OUTOF10: 8

## 2024-01-26 ASSESSMENT — PAIN DESCRIPTION - DESCRIPTORS: DESCRIPTORS: ACHING

## 2024-01-26 ASSESSMENT — HEART SCORE: ECG: 0

## 2024-01-26 ASSESSMENT — PAIN - FUNCTIONAL ASSESSMENT: PAIN_FUNCTIONAL_ASSESSMENT: ACTIVITIES ARE NOT PREVENTED

## 2024-01-26 ASSESSMENT — PAIN DESCRIPTION - ONSET: ONSET: ON-GOING

## 2024-01-26 NOTE — ED NOTES
10:29 PM  I have evaluated the patient as the Provider in Rapid Medical Evaluation (RME). I have reviewed her vital signs and the triage nurse assessment. I have talked with the patient and any available family and advised that I am the provider in triage and have ordered the appropriate study to initiate their work up based on the clinical presentation during my assessment. I have advised that the patient will be accommodated in the Main ED as soon as possible. I have also requested to contact the triage nurse or myself immediately if the patient experiences any changes in their condition during this brief waiting period.    + CP, abd pain.  EMS notes daughter is on her way and is a \"much better historian.\"  BP 82/47.  Admitted for CVA in December.  Pertioneal dialysis patient.  Also hx CABG in Sept 2023.      ELIZABETH Ziegler Lindsay H, PA  01/25/24 7766

## 2024-01-26 NOTE — FLOWSHEET NOTE
01/26/24 1310   Peritoneal Dialysis Catheter Mid lower abdomen   No placement date or time found.   Catheter Location: Mid lower abdomen   Status Accessed   Site Condition Clean, dry, intact   Dressing Status New dressing applied   Dressing Gauze   Date of Last Dressing Change 01/26/24   Dialysis Type Continuous ambulatory   Exit Site Condition excellent   Catheter Care Given Yes   Peritoneal Dialysis (CAPD manual)   Exchange Number 1   Dianeal Solution Dextrose 2.5% in 2000 mL   Dianeal Antibiotic cefepime 1000 mg   Bag Weight (g) 2000 g   Peritoneal Input Status Started   Effluent Appearance Cloudy;Yellow   Peritoneal Dialysis Volume In (ml) 2000 ml   Dwell Time (Hours:Minutes) 6:00   Effluent Volume Out (mL) 2600 ml   Balance This Exchange (mL) 600 ml     Primary RN SBAR: Sari Cooper RN    Patient Education: medication    Dianeal 2.5% Dextrose in 2000 ml with abx fill/dwell performed. To dwell 6 hrs, or until CCPD tonight.    Transfer set changed

## 2024-01-26 NOTE — PROGRESS NOTES
[address], [facility] [place] [address]  Phone: [phone]   [phone]    [email or web address]     Nephrology Progress Note    Patient Name : Galilea York      : 1955     MRN : [id number]  Date of Admission : 2024  Date of Servive : 24    CC:  Follow up for ESRD       Assessment and Plan   ESRD-PD :  - resume CCPD tonight   - daily labs     PD peritonitis   - primary vs 2/2 diarrheal illness  - nevertheless Rx as primary peritonitis w/ empiric abx   - already received 1.75 g of Vanc and 2 gm Cefepime IV   - switch to IP abx  : IP cefepime 2 gm daily and IP vanc (pharmacy to dose)  - 2 rapid exchanges of PD now  - daily PD fluid cellcounts   - f/u PD fluid cx   - plan d/w [facility] nurses     Severe Hypokalemia   Hypo Mg  - chronic issues  - 2/2 poor nutrition and acute Diarrhea  - replete aggresively    Chronic malnutrition   - dietary consult     Anemia of CKD:  - continue LIS     HTN  - BP stable     CAD s/p CABG 23  DM2  COPD  HLD  Prior CVA       Interval History:  Ms York is well known to us and last seen during admission in Dec for strojke like sx. She has been malnourished for sometime   She now presents with several days of diarrhea and developed abdominal pain and presented to ER. PD fluid cellcount in ER > 4000 PMNs  She denied cloudy fluid but reports abdominal pain and has peritonitis signs on exam     Review of Systems: A comprehensive review of systems was negative.    Current Medications:   Current Facility-Administered Medications   Medication Dose Route Frequency    oxyCODONE (ROXICODONE) immediate release tablet 5 mg  5 mg Oral Q4H PRN    fluconazole (DIFLUCAN) in 0.9 % sodium chloride IVPB 200 mg  200 mg IntraVENous Q24H    potassium bicarb-citric acid (EFFER-K) effervescent tablet 40 mEq  40 mEq Oral Q2H    [START ON 2024] potassium bicarb-citric acid (EFFER-K) effervescent tablet 40 mEq  40 mEq Oral BID     MG tablet Take 1 tablet by mouth 3 times daily    albuterol sulfate HFA (PROVENTIL;VENTOLIN;PROAIR) 108 (90 Base) MCG/ACT inhaler Inhale 2 puffs into the lungs every 6 hours as needed for Shortness of Breath or Wheezing    diclofenac sodium (VOLTAREN) 1 % GEL Apply 4 g topically daily as needed for Pain Apply thin layer to back daily as needed for pain    DULoxetine (CYMBALTA) 20 MG extended release capsule Take 1 capsule by mouth 2 times daily    ipratropium-albuterol (DUONEB) 0.5-2.5 (3) MG/3ML SOLN nebulizer solution Inhale 1 vial into the lungs every 6 hours as needed for Shortness of Breath or Wheezing    montelukast (SINGULAIR) 10 MG tablet Take 1 tablet by mouth nightly    rosuvastatin (CRESTOR) 40 MG tablet Take 1 tablet by mouth daily      Allergies   Allergen Reactions    Fd&C Blue #1 (Brilliant Blue) Hives    Iodinated Contrast Media Hives       Objective:  Vitals:    Vitals:    01/26/24 0330 01/26/24 0400 01/26/24 0430 01/26/24 0500   BP: 117/63 126/63 121/63 117/64   Pulse: 90 88 86 88   Resp: 14 17 13 14   Temp:       TempSrc:       SpO2: 99% 100% 99% 99%   Weight:       Height:         Intake and Output:  No intake/output data recorded.  No intake/output data recorded.    Physical Examination:  General: NAD,Conversant   Neck:  Supple, no mass  Resp:  Lungs CTA B/L, no wheezing , normal respiratory effort  CV:  RRR,  no murmur or rub, LE edema  GI:  Soft, diffusely tender, but minimal and no gaurding   Neurologic:  Non focal  Psych:             AAO x 3 appropriate affect     Dialysis Access : PD cath - C/D/I    []    High complexity decision making was performed  []    Patient is at high-risk of decompensation with multiple organ involvement    Lab Data Personally Reviewed: I have reviewed all the pertinent labs, microbiology data and radiology studies during assessment.    Labs:  Recent Labs     01/25/24  2255   *   K 2.5*   CL 95*   CO2 27   GLUCOSE 172*   BUN 18   CREATININE 6.56*   CALCIUM  8.0*       Recent Labs     01/25/24  2255   WBC 9.7   RBC 3.27*   HGB 9.4*   HCT 29.7*   MCV 90.8   MCH 28.7   MCHC 31.6   RDW 15.4*      MPV 10.2     Recent Labs     01/25/24  2255   GLOB 7.1*     No results for input(s): \"INR\", \"APTT\" in the last 72 hours.    Invalid input(s): \"PTP\"   No results for input(s): \"CPK\", \"CKMB\", \"TROPONINI\" in the last 72 hours.    Invalid input(s): \"B-NP\"  Invalid input(s): \"PHI\", \"PCO2I\", \"PO2I\", \"FIO2I\"     Ventilator:       Microbiology:  No results found for: \"SDES\"  No components found for: \"CULT\"      I have reviewed the flowsheets.  Chart and Pertinent Notes have been reviewed.   No change in PMH ,family and social history from Consult note.      Adria Mary MD  [facility]

## 2024-01-26 NOTE — PROGRESS NOTES
Physical Therapy - defer  Orders acknowledged and chart reviewed in prep for PT, patient undergoing PD at this time. Will defer and follow up as able and medically appropriate. Thank you.

## 2024-01-26 NOTE — H&P
History and Physical    Date of Service:  1/26/2024  Primary Care Provider: Chapincito Oakes MD  Source of information: The patient, Chart review, and Spouse/family member    Chief Complaint: Fatigue and Chest Pain      History of Presenting Illness:   Galilea Aguilar is a 68 y.o. female who presented to the ED with generalized abdominal pain, intermittent chest pain X 1 week and fatigue.  Additionally reports vomiting and diarrhea X few days.  Denies fevers.  Denies cough or congestion.  Hypotension with PD this week and hypotensive upon arrival in the ED.    At the bedside patient is alert cooperative and interactive with assessment.  No residual deficits from recent stroke in December.  Abdominal pain is still generalized but endorses worse in the flank areas, ongoing nausea.    PMH of CABG in 9/2023, ESRD on peritoneal dialysis, multiple recurrent small CVA 12/2023, HTN, suspected a-fib on eliquis, IDDM, HLD,     The patient denies any headache, blurry vision, sore throat, trouble swallowing, trouble with speech SOB, cough, fever, chills, urinary symptoms, recent travels, sick contacts, focal or generalized neurological symptoms, falls, injuries, rashes, contact with COVID-19 diagnosed patients, hematemesis, melena, hemoptysis, hematuria, rashes, denies starting any new medications and denies any other concerns or problems besides as mentioned above.       REVIEW OF SYSTEMS:  A comprehensive review of systems was negative except for that written in the History of Present Illness.     Past Medical History:   Diagnosis Date    Asthma     CAD (coronary artery disease)     COPD (chronic obstructive pulmonary disease) ([facility])     PCP manages    CVA (cerebral vascular accident) ([facility]) 11/01/2023    Patient presented with right-sided weakness.  Per MRI of the brain on 11/1/2023, acute versus subacute embolic infarctions involving the left MCA territory and right PICA territory    Diabetes mellitus, type 2 ([facility])      ESRD on peritoneal dialysis (HCC)     Eduardo (Tammie-PD nurse) [facility] [phone]- was on hemodialysis - Eduardo - now on PD    GERD (gastroesophageal reflux disease)     History of blood transfusion     Hyperlipidemia     Hypertension       Past Surgical History:   Procedure Laterality Date    CARDIAC PROCEDURE N/A 2023    Left heart cath / coronary angiography performed by Chandan Leach MD at [facility] CARDIAC CATH LAB    COLONOSCOPY N/A 2023    COLONOSCOPY performed by Drew Pizarro MD at \[facility]"" ENDOSCOPY    CORONARY ANGIOPLASTY WITH STENT PLACEMENT      multiple pt unsure of how many    CORONARY ARTERY BYPASS GRAFT N/A 2023    CORONARY ARTERY BYPASS GRAFTING X 3 WITH LIMA, LESVGH, ECC, SONIA AND EPIAORTIC U/S BY DR YANCEY performed by Moose Gallegos MD at [facility] OPEN HEART    DELIVERY       HYSTERECTOMY (CERVIX STATUS UNKNOWN)      IR INSERT NON TUNNELED CV CATH LESS THAN 5 YEARS  2023    IR INSERT NON TUNNELED CV CATH LESS THAN 5 YEARS 2023 [facility] RAD ANGIO IR    IR NONTUNNELED VASCULAR CATHETER  12/3/2021    IR NONTUNNELED VASCULAR CATHETER 12/3/2021 [facility] RAD ANGIO IR    IR NONTUNNELED VASCULAR CATHETER  2021    IR TUNNELED CATHETER PLACEMENT GREATER THAN 5 YEARS  2021    IR TUNNELED CATHETER PLACEMENT GREATER THAN 5 YEARS 2021 [facility] RAD ANGIO IR    IR TUNNELED CATHETER PLACEMENT GREATER THAN 5 YEARS  2021    peritoneal dialysis    MN UNLISTED PROCEDURE CARDIAC SURGERY      UPPER GASTROINTESTINAL ENDOSCOPY N/A 2023    EGD BIOPSY performed by Drew Pizarro MD at \[facility]\"" ENDOSCOPY    UPPER GASTROINTESTINAL ENDOSCOPY N/A 10/17/2023    EGD ESOPHAGOGASTRODUODENOSCOPY performed by Jared Griffiths MD at [facility] ENDOSCOPY    VASCULAR SURGERY      leg stent     Prior to Admission medications    Medication Sig Start Date End Date Taking? Authorizing Provider   apixaban (ELIQUIS) 2.5 MG TABS tablet Take 1 tablet by mouth 2 times daily 23   Sari Vidales APRN - NP  without rashes or lesions    Data Review:   I have independently reviewed and interpreted patient's lab and all other diagnostic data    Abnormal Labs Reviewed   CBC WITH AUTO DIFFERENTIAL - Abnormal; Notable for the following components:       Result Value    RBC 3.27 (*)     Hemoglobin 9.4 (*)     Hematocrit 29.7 (*)     RDW 15.4 (*)     Neutrophils % 80 (*)     Monocytes % 3 (*)     All other components within normal limits   COMPREHENSIVE METABOLIC PANEL - Abnormal; Notable for the following components:    Sodium 134 (*)     Potassium 2.5 (*)     Chloride 95 (*)     Glucose 172 (*)     Creatinine 6.56 (*)     Bun/Cre Ratio 3 (*)     Est, Glom Filt Rate 6 (*)     Calcium 8.0 (*)     ALT 8 (*)     AST 13 (*)     Alk Phosphatase 126 (*)     Total Protein 8.8 (*)     Albumin 1.7 (*)     Globulin 7.1 (*)     Albumin/Globulin Ratio 0.2 (*)     All other components within normal limits   LACTATE, SEPSIS - Abnormal; Notable for the following components:    Lactic Acid, Sepsis 4.2 (*)     All other components within normal limits   LACTATE, SEPSIS - Abnormal; Notable for the following components:    Lactic Acid, Sepsis 2.4 (*)     All other components within normal limits   MAGNESIUM - Abnormal; Notable for the following components:    Magnesium 1.4 (*)     All other components within normal limits   BODY FLUID CELL COUNT - Abnormal; Notable for the following components:    RBC, Fluid 12 (*)     Polys, Fluid 83 (*)     Lymphocytes, Body Fluid 5 (*)     Mono/Macro, Fluid 12 (*)     All other components within normal limits       [unfilled]    IMAGING:   CT ABDOMEN PELVIS WO CONTRAST Additional Contrast? None   Final Result   Moderate free fluid with peritoneal dialysis catheter coiled in the deep pelvis.   Cholelithiasis. Stable right retroperitoneal lipoma. No acute abnormality.      XR CHEST (2 VW)   Final Result      No acute process or change compared to the prior exam.              ECG/ECHO:  [unfilled]

## 2024-01-26 NOTE — CONSULTS
Comprehensive Nutrition Assessment    Type and Reason for Visit: Initial, Consult    Nutrition Recommendations/Plan:   Continue with 4 Carb Choice, Low Phos, 2gm Na+ diet order   Will order Gelatein, Ensure Clear ONS       Malnutrition Assessment:  Malnutrition Status:  Moderate malnutrition (01/26/24 1452)    Context:  Chronic Illness     Findings of the 6 clinical characteristics of malnutrition:  Energy Intake:  Mild decrease in energy intake (Comment)  Weight Loss:  Greater than 7.5% over 3 months     Body Fat Loss:  Mild body fat loss Triceps   Muscle Mass Loss:  Unable to assess    Fluid Accumulation:  No significant fluid accumulation     Strength:  Not Performed       Nutrition Assessment:    69 yo female admitted for severe sepsis, ABD pain with n/v/d x several days.  Pmhx: CAD s/p CABG, COPD, CVA, ESRD - on PD, DM, GERD, HTN, HLD.     MD consult received for Poor PO intakes.  MD notes pt with peritonitis.  Continuing PD at this time.  Spoke with pt at bedside.  She reports not eating well for several days PTA secondary to n/v.  Prior to that she reports having a good appetite normally.  Breakfast tray was still in room, she only ate the fruit, states she was afraid to eat more because she did not want to get sick.  States she received some Zofran today 2/2 c/o nausea.  Discussed ONS options.  During previous admissions she has received Glucerna shakes, states she does not like them.  Has also received Gelatein and she said that is okay so she will accept it.  Also willing to try Ensure Clear (mixed berry).  Obtained some food preferences that I will add to her diet order as well.     Pt states her UBW is 165 lbs.  Current charted bed scale weight is 156 lbs.  This is a 5% loss but pt unsure when she last weighed 165 lbs.  Weight hx in EMR indicates weight loss of 40 lbs over last 5 months which is a significant amount of 20%.     Labs: K+ 2.5, Mg 1.4,         Nutritionally Significant  Goals: PO intakes > 50% meals + ONS with no n/v over next 5-7 days    Nutrition Monitoring and Evaluation:   Behavioral-Environmental Outcomes: None Identified  Food/Nutrient Intake Outcomes: Food and Nutrient Intake, Supplement Intake  Physical Signs/Symptoms Outcomes: Biochemical Data, GI Status, Nausea or Vomiting, Weight    Discharge Planning:    Continue current diet, Continue Oral Nutrition Supplement     Tarren Ismail, RD  Available via [employer]

## 2024-01-26 NOTE — FLOWSHEET NOTE
01/26/24 1150   Observations & Evaluations   Level of Consciousness 0   Oriented X 4   Peritoneal Dialysis Catheter Mid lower abdomen   No placement date or time found.   Catheter Location: Mid lower abdomen   Status Accessed   Dressing Status Clean, dry & intact   Dressing Gauze   Catheter Care Given Yes   Peritoneal Dialysis (CAPD manual)   Exchange Number 1   Dianeal Solution Dextrose 1.5% in 2500 mL   Bag Weight (g) 200 g   Peritoneal Input Status Started   Effluent Appearance Cloudy;Light   Peritoneal Dialysis Volume In (ml) 2000 ml   Dwell Time (Hours:Minutes) 1:00   Effluent Volume Out (mL) 2300 ml   Balance This Exchange (mL) 300 ml     2nd dose of rapid exchange performed. 2.5% Dextrose in 2000 ml.  Primary RN SBAR: Allison Guo RN  Patient Education: access care, procedure    Hep B Ag Negative 01/12/24 CWOW  Hep Ab susceptible 01/12/24 CWOW

## 2024-01-26 NOTE — ED PROVIDER NOTES
[facility] EMERGENCY DEP  EMERGENCY DEPARTMENT ENCOUNTER      Pt Name: Galilea Aguilar  MRN: [id number]  Birthdate 1955  Date of evaluation: 1/25/2024  Provider: Barrington Desouza DO    CHIEF COMPLAINT       Chief Complaint   Patient presents with    Fatigue    Chest Pain         HISTORY OF PRESENT ILLNESS   (Location/Symptom, Timing/Onset, Context/Setting, Quality, Duration, Modifying Factors, Severity)  Note limiting factors.   68 y.o. female hx as below here today with abdominal pain, chest pain, and fatigue. Has also had vomiting and diarrhea for past few days. Can't tell me how much v/d she has had. Abd pain is generalized. No fever. On peritoneal dialysis, no issues with this other than hypotension this week. No cough or congestion. +on and off chest pain x 1 week. Hypotensive upon arrival.             Review of External Medical Records:     Nursing Notes were reviewed.    REVIEW OF SYSTEMS    (2-9 systems for level 4, 10 or more for level 5)     Review of Systems   Gastrointestinal:  Positive for abdominal pain, diarrhea, nausea and vomiting.       Except as noted above the remainder of the review of systems was reviewed and negative.       PAST MEDICAL HISTORY     Past Medical History:   Diagnosis Date    Asthma     CAD (coronary artery disease)     COPD (chronic obstructive pulmonary disease) ([facility])     PCP manages    CVA (cerebral vascular accident) ([facility]) 11/01/2023    Patient presented with right-sided weakness.  Per MRI of the brain on 11/1/2023, acute versus subacute embolic infarctions involving the left MCA territory and right PICA territory    Diabetes mellitus, type 2 (HCC)     ESRD on peritoneal dialysis ([facility])     Eduardo (Tammie-PD nurse) [facility] [phone]- was on hemodialysis M-W-F Eduardo - now on PD    GERD (gastroesophageal reflux disease)     History of blood transfusion     Hyperlipidemia     Hypertension          SURGICAL HISTORY       Past Surgical History:   Procedure Laterality Date

## 2024-01-26 NOTE — PROGRESS NOTES
Day #1 of Vancomycin  Indication:  peritonitis  Current regimen:  vanc 1.75g x1 loading dose (given)  Abx regimen:  IP vanc/cefepime  ID Following ?: NO  Concomitant nephrotoxic drugs (requires more frequent monitoring): None  Frequency of BMP?: daily    Recent Labs     24  2255   WBC 9.7   CREATININE 6.56*   BUN 18     Est CrCl: PD  Temp (24hrs), Av.1 °F (36.7 °C), Min:97.9 °F (36.6 °C), Max:98.2 °F (36.8 °C)    Cultures:    peritoneal fluid: occasional GPC in brandt; prelim   blood: NG x1 day; prelim    MRSA Swab ordered (if applicable)? N/A    Goal target range Trough 10-15 mcg/mL    Recent level history:  Date/Time Dose & Interval Measured Level (mcg/mL) Associated AUC/EMERSON Dose Adjustment     @ 0111 Vanc 1.75g x1 -- -- Change to IP once serum levels ~15 mcg/ml                                           Plan: Change to vanc 1.25g x1 (20 mg/kg) IP in one bag of peritoneal fluid once random serum drug levels approach 15 mcg/ml.   Will obtain random level with AM labs tomorrow to guide timing of this transition.

## 2024-01-26 NOTE — PROGRESS NOTES
Occupational Therapy   01.26.2024    Orders acknowledged and chart reviewed in prep for OT, patient undergoing PD at this time. Will defer and follow up as able and medically appropriate. Thank you.     Kasey Levy MS, OTR/L

## 2024-01-26 NOTE — FLOWSHEET NOTE
01/26/24 1012   Observations & Evaluations   Level of Consciousness 0   Oriented X 4   Heart Rhythm Regular   Respiratory Quality/Effort Unlabored   O2 Device None (Room air)   Skin Condition/Temp Dry;Warm   Edema None   Peritoneal Dialysis Catheter Mid lower abdomen   No placement date or time found.   Catheter Location: Mid lower abdomen   Status Accessed   Dressing Status Clean, dry & intact   Dressing Gauze   Catheter Care Given Yes   Peritoneal Dialysis (CAPD manual)   Exchange Number 1   Dianeal Solution Dextrose 2.5% in 2000 mL   Bag Weight (g) 2000 g   Peritoneal Input Status Started   Effluent Appearance Cloudy;Yellow   Peritoneal Dialysis Volume In (ml) 2000 ml   Dwell Time (Hours:Minutes) 1:00   Effluent Volume Out (mL) 600 ml   Balance This Exchange (mL) -1400 ml     Time Out (time): 1100  Primary RN SBAR: Sari Cooper RN  2L 2.5% Dianeal fill/dwell started    Patient Education: access care, procedure

## 2024-01-26 NOTE — PROGRESS NOTES
Peritoneal Dialysis  / [phone]      Comments:   Miguelangel RN at bedside to collect PD fluid sample for ordered cell count and culture via RADHA Andrade.  Samples collected w/ aseptic technique and walked to lab via Miguelangel MARRERO.    Primary RN notified to theo Means with any further questions/concerns.  RADHA Andrade states pt possible CCPD tx this evening - no orders at this time.

## 2024-01-26 NOTE — ED TRIAGE NOTES
Pt arrives via EMS from home c/o hypotension, chest pain, and pain at peritoneal site. Pt lethargic in triage.

## 2024-01-27 LAB
ACCESSION NUMBER, LLC1M: ABNORMAL
ACINETOBACTER CALCOAC BAUMANNII COMPLEX BY PCR: NOT DETECTED
ALBUMIN SERPL-MCNC: 2.2 G/DL (ref 3.5–5)
ANION GAP SERPL CALC-SCNC: 10 MMOL/L (ref 5–15)
APPEARANCE FLD: CLEAR
B FRAGILIS DNA BLD POS QL NAA+NON-PROBE: NOT DETECTED
BASOPHILS # BLD: 0.1 K/UL (ref 0–0.1)
BASOPHILS NFR BLD: 1 % (ref 0–1)
BIOFIRE TEST COMMENT: ABNORMAL
BUN SERPL-MCNC: 17 MG/DL (ref 6–20)
BUN/CREAT SERPL: 3 (ref 12–20)
C ALBICANS DNA BLD POS QL NAA+NON-PROBE: NOT DETECTED
C AURIS DNA BLD POS QL NAA+NON-PROBE: NOT DETECTED
C GATTII+NEOFOR DNA BLD POS QL NAA+N-PRB: NOT DETECTED
C GLABRATA DNA BLD POS QL NAA+NON-PROBE: NOT DETECTED
C KRUSEI DNA BLD POS QL NAA+NON-PROBE: NOT DETECTED
C PARAP DNA BLD POS QL NAA+NON-PROBE: NOT DETECTED
C TROPICLS DNA BLD POS QL NAA+NON-PROBE: NOT DETECTED
CALCIUM SERPL-MCNC: 6.6 MG/DL (ref 8.5–10.1)
CHLORIDE SERPL-SCNC: 103 MMOL/L (ref 97–108)
CO2 SERPL-SCNC: 23 MMOL/L (ref 21–32)
COLOR FLD: ABNORMAL
CREAT SERPL-MCNC: 5.8 MG/DL (ref 0.55–1.02)
DIFFERENTIAL METHOD BLD: ABNORMAL
E CLOAC COMP DNA BLD POS NAA+NON-PROBE: NOT DETECTED
E COLI DNA BLD POS QL NAA+NON-PROBE: NOT DETECTED
E FAECALIS DNA BLD POS QL NAA+NON-PROBE: NOT DETECTED
E FAECIUM DNA BLD POS QL NAA+NON-PROBE: NOT DETECTED
ENTEROBACTERALES DNA BLD POS NAA+N-PRB: NOT DETECTED
EOSINOPHIL # BLD: 0.2 K/UL (ref 0–0.4)
EOSINOPHIL NFR BLD: 3 % (ref 0–7)
EOSINOPHIL NFR FLD MANUAL: 1 %
ERYTHROCYTE [DISTWIDTH] IN BLOOD BY AUTOMATED COUNT: 15.7 % (ref 11.5–14.5)
ERYTHROCYTE [DISTWIDTH] IN BLOOD BY AUTOMATED COUNT: 15.8 % (ref 11.5–14.5)
GLUCOSE BLD STRIP.AUTO-MCNC: 279 MG/DL (ref 65–117)
GLUCOSE SERPL-MCNC: 246 MG/DL (ref 65–100)
GP B STREP DNA BLD POS QL NAA+NON-PROBE: NOT DETECTED
HAEM INFLU DNA BLD POS QL NAA+NON-PROBE: NOT DETECTED
HCT VFR BLD AUTO: 20 % (ref 35–47)
HCT VFR BLD AUTO: 21.5 % (ref 35–47)
HCT VFR BLD AUTO: 22.6 % (ref 35–47)
HGB BLD-MCNC: 6.2 G/DL (ref 11.5–16)
HGB BLD-MCNC: 6.7 G/DL (ref 11.5–16)
HGB BLD-MCNC: 7.1 G/DL (ref 11.5–16)
HISTORY CHECK: NORMAL
IMM GRANULOCYTES # BLD AUTO: 0 K/UL (ref 0–0.04)
IMM GRANULOCYTES NFR BLD AUTO: 0 % (ref 0–0.5)
K OXYTOCA DNA BLD POS QL NAA+NON-PROBE: NOT DETECTED
KLEBSIELLA SP DNA BLD POS QL NAA+NON-PRB: NOT DETECTED
KLEBSIELLA SP DNA BLD POS QL NAA+NON-PRB: NOT DETECTED
L MONOCYTOG DNA BLD POS QL NAA+NON-PROBE: NOT DETECTED
LYMPHOCYTES # BLD: 1.2 K/UL (ref 0.8–3.5)
LYMPHOCYTES NFR BLD: 16 % (ref 12–49)
LYMPHOCYTES NFR FLD: 18 %
MAGNESIUM SERPL-MCNC: 1.7 MG/DL (ref 1.6–2.4)
MCH RBC QN AUTO: 28.5 PG (ref 26–34)
MCH RBC QN AUTO: 28.7 PG (ref 26–34)
MCHC RBC AUTO-ENTMCNC: 31 G/DL (ref 30–36.5)
MCHC RBC AUTO-ENTMCNC: 31.2 G/DL (ref 30–36.5)
MCV RBC AUTO: 91.5 FL (ref 80–99)
MCV RBC AUTO: 92.6 FL (ref 80–99)
MECA+MECC ISLT/SPM QL: DETECTED
MESOTHL CELL NFR FLD: 1 %
MONOCYTES # BLD: 0.4 K/UL (ref 0–1)
MONOCYTES NFR BLD: 5 % (ref 5–13)
MONOS+MACROS NFR FLD: 42 %
N MEN DNA BLD POS QL NAA+NON-PROBE: NOT DETECTED
NEUTROPHILS NFR FLD: 38 %
NEUTS SEG # BLD: 5.9 K/UL (ref 1.8–8)
NEUTS SEG NFR BLD: 75 % (ref 32–75)
NRBC # BLD: 0 K/UL (ref 0–0.01)
NRBC # BLD: 0 K/UL (ref 0–0.01)
NRBC BLD-RTO: 0 PER 100 WBC
NRBC BLD-RTO: 0 PER 100 WBC
NUC CELL # FLD: 199 /CU MM
P AERUGINOSA DNA BLD POS NAA+NON-PROBE: NOT DETECTED
PHOSPHATE SERPL-MCNC: 1.9 MG/DL (ref 2.6–4.7)
PLATELET # BLD AUTO: 271 K/UL (ref 150–400)
PLATELET # BLD AUTO: 303 K/UL (ref 150–400)
PMV BLD AUTO: 9.3 FL (ref 8.9–12.9)
PMV BLD AUTO: 9.7 FL (ref 8.9–12.9)
POTASSIUM SERPL-SCNC: 3.8 MMOL/L (ref 3.5–5.1)
PROTEUS SP DNA BLD POS QL NAA+NON-PROBE: NOT DETECTED
RBC # BLD AUTO: 2.16 M/UL (ref 3.8–5.2)
RBC # BLD AUTO: 2.35 M/UL (ref 3.8–5.2)
RBC # FLD: 50 /CU MM
RBC MORPH BLD: ABNORMAL
RESISTANT GENE TARGETS: ABNORMAL
S AUREUS DNA BLD POS QL NAA+NON-PROBE: NOT DETECTED
S AUREUS+CONS DNA BLD POS NAA+NON-PROBE: DETECTED
S EPIDERMIDIS DNA BLD POS QL NAA+NON-PRB: DETECTED
S LUGDUNENSIS DNA BLD POS QL NAA+NON-PRB: NOT DETECTED
S MALTOPHILIA DNA BLD POS QL NAA+NON-PRB: NOT DETECTED
S MARCESCENS DNA BLD POS NAA+NON-PROBE: NOT DETECTED
S PNEUM DNA BLD POS QL NAA+NON-PROBE: NOT DETECTED
S PYO DNA BLD POS QL NAA+NON-PROBE: NOT DETECTED
SALMONELLA DNA BLD POS QL NAA+NON-PROBE: NOT DETECTED
SERVICE CMNT-IMP: ABNORMAL
SODIUM SERPL-SCNC: 136 MMOL/L (ref 136–145)
SPECIMEN SOURCE FLD: ABNORMAL
STREPTOCOCCUS DNA BLD POS NAA+NON-PROBE: NOT DETECTED
VANCOMYCIN SERPL-MCNC: 15.3 UG/ML
VANCOMYCIN SERPL-MCNC: 18.2 UG/ML
WBC # BLD AUTO: 7.6 K/UL (ref 3.6–11)
WBC # BLD AUTO: 7.8 K/UL (ref 3.6–11)

## 2024-01-27 PROCEDURE — 97161 PT EVAL LOW COMPLEX 20 MIN: CPT

## 2024-01-27 PROCEDURE — 90945 DIALYSIS ONE EVALUATION: CPT

## 2024-01-27 PROCEDURE — 36430 TRANSFUSION BLD/BLD COMPNT: CPT

## 2024-01-27 PROCEDURE — 6360000002 HC RX W HCPCS: Performed by: STUDENT IN AN ORGANIZED HEALTH CARE EDUCATION/TRAINING PROGRAM

## 2024-01-27 PROCEDURE — P9016 RBC LEUKOCYTES REDUCED: HCPCS

## 2024-01-27 PROCEDURE — 2580000003 HC RX 258: Performed by: NURSE PRACTITIONER

## 2024-01-27 PROCEDURE — 85025 COMPLETE CBC W/AUTO DIFF WBC: CPT

## 2024-01-27 PROCEDURE — 6370000000 HC RX 637 (ALT 250 FOR IP): Performed by: INTERNAL MEDICINE

## 2024-01-27 PROCEDURE — A4722 DIALYS SOL FLD VOL > 1999CC: HCPCS | Performed by: INTERNAL MEDICINE

## 2024-01-27 PROCEDURE — 6360000002 HC RX W HCPCS: Performed by: INTERNAL MEDICINE

## 2024-01-27 PROCEDURE — 6370000000 HC RX 637 (ALT 250 FOR IP): Performed by: NURSE PRACTITIONER

## 2024-01-27 PROCEDURE — 86923 COMPATIBILITY TEST ELECTRIC: CPT

## 2024-01-27 PROCEDURE — 89050 BODY FLUID CELL COUNT: CPT

## 2024-01-27 PROCEDURE — 2060000000 HC ICU INTERMEDIATE R&B

## 2024-01-27 PROCEDURE — 86900 BLOOD TYPING SEROLOGIC ABO: CPT

## 2024-01-27 PROCEDURE — 94640 AIRWAY INHALATION TREATMENT: CPT

## 2024-01-27 PROCEDURE — 86901 BLOOD TYPING SEROLOGIC RH(D): CPT

## 2024-01-27 PROCEDURE — 6360000002 HC RX W HCPCS: Performed by: NURSE PRACTITIONER

## 2024-01-27 PROCEDURE — 85014 HEMATOCRIT: CPT

## 2024-01-27 PROCEDURE — 2580000003 HC RX 258: Performed by: INTERNAL MEDICINE

## 2024-01-27 PROCEDURE — 36415 COLL VENOUS BLD VENIPUNCTURE: CPT

## 2024-01-27 PROCEDURE — 30233N1 TRANSFUSION OF NONAUTOLOGOUS RED BLOOD CELLS INTO PERIPHERAL VEIN, PERCUTANEOUS APPROACH: ICD-10-PCS | Performed by: INTERNAL MEDICINE

## 2024-01-27 PROCEDURE — 83735 ASSAY OF MAGNESIUM: CPT

## 2024-01-27 PROCEDURE — 6370000000 HC RX 637 (ALT 250 FOR IP): Performed by: STUDENT IN AN ORGANIZED HEALTH CARE EDUCATION/TRAINING PROGRAM

## 2024-01-27 PROCEDURE — 80069 RENAL FUNCTION PANEL: CPT

## 2024-01-27 PROCEDURE — 80202 ASSAY OF VANCOMYCIN: CPT

## 2024-01-27 PROCEDURE — 2580000003 HC RX 258

## 2024-01-27 PROCEDURE — 85027 COMPLETE CBC AUTOMATED: CPT

## 2024-01-27 PROCEDURE — 86850 RBC ANTIBODY SCREEN: CPT

## 2024-01-27 PROCEDURE — 82962 GLUCOSE BLOOD TEST: CPT

## 2024-01-27 PROCEDURE — 97530 THERAPEUTIC ACTIVITIES: CPT

## 2024-01-27 PROCEDURE — 85018 HEMOGLOBIN: CPT

## 2024-01-27 RX ORDER — SODIUM CHLORIDE 9 MG/ML
INJECTION, SOLUTION INTRAVENOUS PRN
Status: DISCONTINUED | OUTPATIENT
Start: 2024-01-27 | End: 2024-02-05 | Stop reason: HOSPADM

## 2024-01-27 RX ORDER — 0.9 % SODIUM CHLORIDE 0.9 %
250 INTRAVENOUS SOLUTION INTRAVENOUS ONCE
Status: COMPLETED | OUTPATIENT
Start: 2024-01-27 | End: 2024-01-27

## 2024-01-27 RX ORDER — ROSUVASTATIN CALCIUM 10 MG/1
10 TABLET, COATED ORAL DAILY
Status: DISCONTINUED | OUTPATIENT
Start: 2024-01-28 | End: 2024-02-05 | Stop reason: HOSPADM

## 2024-01-27 RX ORDER — SODIUM CHLORIDE, SODIUM LACTATE, CALCIUM CHLORIDE, MAGNESIUM CHLORIDE AND DEXTROSE 2.5; 538; 448; 18.3; 5.08 G/100ML; MG/100ML; MG/100ML; MG/100ML; MG/100ML
6000 INJECTION, SOLUTION INTRAPERITONEAL
Status: DISCONTINUED | OUTPATIENT
Start: 2024-01-28 | End: 2024-01-27

## 2024-01-27 RX ORDER — SODIUM CHLORIDE, SODIUM LACTATE, CALCIUM CHLORIDE, MAGNESIUM CHLORIDE AND DEXTROSE 2.5; 538; 448; 18.3; 5.08 G/100ML; MG/100ML; MG/100ML; MG/100ML; MG/100ML
6000 INJECTION, SOLUTION INTRAPERITONEAL
Status: DISCONTINUED | OUTPATIENT
Start: 2024-01-27 | End: 2024-01-29

## 2024-01-27 RX ORDER — SODIUM CHLORIDE, SODIUM LACTATE, CALCIUM CHLORIDE, MAGNESIUM CHLORIDE AND DEXTROSE 2.5; 538; 448; 18.3; 5.08 G/100ML; MG/100ML; MG/100ML; MG/100ML; MG/100ML
6000 INJECTION, SOLUTION INTRAPERITONEAL
Status: DISCONTINUED | OUTPATIENT
Start: 2024-01-27 | End: 2024-02-05 | Stop reason: HOSPADM

## 2024-01-27 RX ADMIN — OXYCODONE HYDROCHLORIDE 5 MG: 5 TABLET ORAL at 17:07

## 2024-01-27 RX ADMIN — ERYTHROPOIETIN 20000 UNITS: 20000 INJECTION, SOLUTION INTRAVENOUS; SUBCUTANEOUS at 17:08

## 2024-01-27 RX ADMIN — ONDANSETRON 4 MG: 2 INJECTION INTRAMUSCULAR; INTRAVENOUS at 10:16

## 2024-01-27 RX ADMIN — DULOXETINE HYDROCHLORIDE 20 MG: 20 CAPSULE, DELAYED RELEASE ORAL at 21:59

## 2024-01-27 RX ADMIN — OXYCODONE HYDROCHLORIDE 5 MG: 5 TABLET ORAL at 10:19

## 2024-01-27 RX ADMIN — SODIUM CHLORIDE, PRESERVATIVE FREE 10 ML: 5 INJECTION INTRAVENOUS at 21:59

## 2024-01-27 RX ADMIN — APIXABAN 2.5 MG: 5 TABLET, FILM COATED ORAL at 10:18

## 2024-01-27 RX ADMIN — FLUCONAZOLE IN SODIUM CHLORIDE 200 MG: 2 INJECTION, SOLUTION INTRAVENOUS at 02:49

## 2024-01-27 RX ADMIN — CEFEPIME: 1 INJECTION, POWDER, FOR SOLUTION INTRAMUSCULAR; INTRAVENOUS at 11:12

## 2024-01-27 RX ADMIN — GENTAMICIN SULFATE: 1 CREAM TOPICAL at 17:51

## 2024-01-27 RX ADMIN — ONDANSETRON 4 MG: 2 INJECTION INTRAMUSCULAR; INTRAVENOUS at 17:07

## 2024-01-27 RX ADMIN — SODIUM CHLORIDE, SODIUM LACTATE, CALCIUM CHLORIDE, MAGNESIUM CHLORIDE AND DEXTROSE 6000 ML: 2.5; 538; 448; 18.3; 5.08 INJECTION, SOLUTION INTRAPERITONEAL at 17:51

## 2024-01-27 RX ADMIN — SODIUM CHLORIDE, SODIUM LACTATE, CALCIUM CHLORIDE, MAGNESIUM CHLORIDE AND DEXTROSE 6000 ML: 2.5; 538; 448; 18.3; 5.08 INJECTION, SOLUTION INTRAPERITONEAL at 17:50

## 2024-01-27 RX ADMIN — DULOXETINE HYDROCHLORIDE 20 MG: 20 CAPSULE, DELAYED RELEASE ORAL at 10:18

## 2024-01-27 RX ADMIN — CARVEDILOL 6.25 MG: 3.12 TABLET, FILM COATED ORAL at 17:07

## 2024-01-27 RX ADMIN — INSULIN GLARGINE 5 UNITS: 100 INJECTION, SOLUTION SUBCUTANEOUS at 23:10

## 2024-01-27 RX ADMIN — FOLIC ACID 1 MG: 1 TABLET ORAL at 10:18

## 2024-01-27 RX ADMIN — ONDANSETRON 4 MG: 2 INJECTION INTRAMUSCULAR; INTRAVENOUS at 22:12

## 2024-01-27 RX ADMIN — SODIUM CHLORIDE, PRESERVATIVE FREE 10 ML: 5 INJECTION INTRAVENOUS at 10:21

## 2024-01-27 RX ADMIN — SODIUM CHLORIDE 250 ML: 9 INJECTION, SOLUTION INTRAVENOUS at 20:40

## 2024-01-27 RX ADMIN — ASPIRIN 81 MG CHEWABLE TABLET 81 MG: 81 TABLET CHEWABLE at 10:19

## 2024-01-27 RX ADMIN — MONTELUKAST 10 MG: 10 TABLET, FILM COATED ORAL at 21:59

## 2024-01-27 RX ADMIN — ROSUVASTATIN 40 MG: 40 TABLET, FILM COATED ORAL at 10:19

## 2024-01-27 RX ADMIN — OXYCODONE HYDROCHLORIDE 5 MG: 5 TABLET ORAL at 22:11

## 2024-01-27 RX ADMIN — CARVEDILOL 6.25 MG: 3.12 TABLET, FILM COATED ORAL at 10:18

## 2024-01-27 RX ADMIN — POTASSIUM BICARBONATE 40 MEQ: 782 TABLET, EFFERVESCENT ORAL at 10:19

## 2024-01-27 RX ADMIN — POTASSIUM BICARBONATE 40 MEQ: 782 TABLET, EFFERVESCENT ORAL at 21:59

## 2024-01-27 RX ADMIN — ARFORMOTEROL TARTRATE: 15 SOLUTION RESPIRATORY (INHALATION) at 07:48

## 2024-01-27 ASSESSMENT — PAIN DESCRIPTION - DESCRIPTORS: DESCRIPTORS: ACHING

## 2024-01-27 ASSESSMENT — PAIN SCALES - GENERAL: PAINLEVEL_OUTOF10: 7

## 2024-01-27 ASSESSMENT — PAIN DESCRIPTION - LOCATION: LOCATION: BACK

## 2024-01-27 NOTE — PROGRESS NOTES
Occupational Therapy  01/27/24    Order acknowledged, chart reviewed. Patient currently with HGB of 6.7 pending pRBC transfusion this afternoon (noted to have limited activity tolerance with PT this AM), will follow up tomorrow as able/appropriate.     Thank you,   JULIO CÉSAR Angeles, OTR/L

## 2024-01-27 NOTE — ED NOTES
TRANSFER - OUT REPORT:    Verbal report given to JANES harrington on Galilea Aguilar  being transferred to  for routine progression of patient care       Report consisted of patient's Situation, Background, Assessment and   Recommendations(SBAR).     Information from the following report(s) Nurse Handoff Report, Index, ED Encounter Summary, ED SBAR, Adult Overview, Intake/Output, MAR, and Recent Results was reviewed with the receiving nurse.    [place] Assessment:    Presents to emergency department  because of falls (Syncope, seizure, or loss of consciousness): No  Age > 70: No  Altered Mental Status, Intoxication with alcohol or substance confusion (Disorientation, impaired judgment, poor safety awaremess, or inability to follow instructions): Yes  Impaired Mobility: Ambulates or transfers with assistive devices or assistance; Unable to ambulate or transer.: Yes  Nursing Judgement: Yes          Lines:   Peripheral IV 01/26/24 Distal;Left;Posterior Forearm (Active)   Site Assessment Clean, dry & intact 01/26/24 0019       Peripheral IV 01/26/24 Distal;Posterior;Right Forearm (Active)   Site Assessment Clean, dry & intact 01/26/24 0241        Opportunity for questions and clarification was provided.      Patient transported with:  Monitor and Registered Nurse

## 2024-01-27 NOTE — FLOWSHEET NOTE
CAPD Manual Exchange:     01/27/24 1101   Vitals   BP 96/74   Temp 97.7 °F (36.5 °C)   Temp Source Oral   Pulse 86   Respirations 18   Observations & Evaluations   Level of Consciousness 0   Heart Rhythm Regular   Respiratory Quality/Effort Unlabored   O2 Device None (Room air)   Skin Condition/Temp Dry;Warm   Abdomen Inspection Obese;Soft   Edema None   Peritoneal Dialysis Catheter Mid lower abdomen   No placement date or time found.   Catheter Location: Mid lower abdomen   Status Accessed   Site Condition Clean, dry, intact   Dressing Status Clean, dry & intact   Dressing Gauze   Date of Last Dressing Change 01/26/24   Dialysis Type Continuous ambulatory   Catheter Care Given Yes   Peritoneal Dialysis (CAPD manual)   Exchange Number 1   Dianeal Solution Dextrose 1.5% in 2000 mL   Dianeal Antibiotic cefepime   Effluent Appearance Cloudy;Yellow   Peritoneal Dialysis Volume In (ml) 2000 ml   Dwell Time (Hours:Minutes) 6:00     Primary RN SBAR: INES Lambert, RN  Patient Education: PD cath infection prevention & control.  Hepatitis B Surface Ag   Date/Time Value Ref Range Status   12/15/2023 05:40 AM <0.10 Index Final     Hep B S Ab   Date/Time Value Ref Range Status   12/15/2023 05:40 AM <3.10 mIU/mL Final     Pt orders, notes, labs, code status and consent reviewed. Time out complete.       Left abdominal PD site dry gauze dressing dated 1/26/24. Prior to manual exchange, one minute Alcavis scrub & soak performed.    Last Fill: 1000 ml  Manual Drain: 800 ml  UF: -200 ml (Fluid Gain)    CAPD Start Time 1130   CAPD End Time: 1730

## 2024-01-27 NOTE — CARE COORDINATION
Care Management Initial Assessment       RUR:36%  Readmission? No  1st IM letter given? Yes - 1/27 1st  letter given: No     Disposition: Home with home health, PT/OT. Referral pending with [facility].      CM met with pt. She is alert and oriented x4. Demographics and insurance confirmed. Pt lives with her son in a one level home. She uses a cane, walker, or rollator as needed. Pt receives assist with ADL's by her son or her sister with cooking and housework. She is modified assist with bathing and toileting. Pt verbalizes that there is a family member at home at all times. Pt receives peritoneal dialysis every day in her home.  Plan is to return home with home health pending medical progression. PT/OT are following. CM sent referral to [facility] health per recommendations, and pt choice. Pt has history with [facility]. Referral is pending via CC link.  CM following.       01/27/24 1310   Service Assessment   Patient Orientation Alert and Oriented;Person;Place;Situation;Self   Cognition Alert   History Provided By Patient   Primary Caregiver Self   Support Systems Children;Family Members   Patient's Healthcare Decision Maker is: Legal Next of Kin   PCP Verified by CM Yes   Last Visit to PCP Within last 6 months   Prior Functional Level Housework;Shopping;Cooking;Assistance with the following:;Bathing   Current Functional Level Assistance with the following:;Cooking;Housework;Shopping;Bathing   Can patient return to prior living arrangement Yes   Ability to make needs known: Good   Family able to assist with home care needs: Yes   Would you like for me to discuss the discharge plan with any other family members/significant others, and if so, who? No   Financial Resources Medicare   Community Resources None   Social/Functional History   Lives With Son   Type of Home House   Home Layout One level   Home Access Stairs to enter with rails   Entrance Stairs - Number of Steps 4   Entrance Stairs -

## 2024-01-27 NOTE — FLOWSHEET NOTE
Peritoneal Dialysis Initiation / [phone]    Primary RN SBAR: Julia, RN  Patient Education: access/site care  Hospital associated wait time; reason: pt tx from ER to 212, obtaining PD bags from pharmacy    Hepatitis B Surface Ag   Date/Time Value Ref Range Status   12/15/2023 05:40 AM <0.10 Index Final     Hep B S Ab   Date/Time Value Ref Range Status   12/15/2023 05:40 AM <3.10 mIU/mL Final      Hep B S Ag: Negative   01/12/24  Hep B S Ab: Susceptible   01/12/24  Source: [facility]       01/26/24 2200   Vitals   /62   Temp 98 °F (36.7 °C)   Temp Source Oral   Pulse 82   Respirations 18   SpO2 100 %   Observations & Evaluations   Level of Consciousness 0   Oriented X 4   Heart Rhythm Regular   Respiratory Quality/Effort Unlabored   O2 Device None (Room air)   Edema None   Peritoneal Dialysis Catheter Mid lower abdomen   No placement date or time found.   Catheter Location: Mid lower abdomen   Status Accessed   Site Condition Clean, dry, intact   Dressing Status Clean, dry & intact   Dressing Gauze   Date of Last Dressing Change 01/26/24   Dialysis Type Continuous cycling   Exit Site Condition excellent   Catheter Care Given Yes   Cycler   Verification of Prescription CCPD   Informed Consent    (chronic consent applies)   Total Volume Programmed 63266 mL   Therapy Time (Hours:Minutes) 9:30   Cycler Type [facility]   Fill Volume 2300 mL   Last Fill Volume 1000 mL   Dextrose Setting Same (Nonextraneal)   Number of Cycles 5   Bag Volume 6000 mL   Number of Bags Used 3   Dianeal Solution   (2.5% dextrose in 6000 ml x 3)     Miguelangel RN at bedside to initiate CCPD tx for the night. Pt orders, notes, labs, code status reviewed.     Remained present during initial drain followed by initiation of first fill. Prior to departure, bed in lowest position, call bell and personal belongings within reach. Lines visible, secure, and connections fastened well.  Education and pre/post report given to

## 2024-01-27 NOTE — FLOWSHEET NOTE
CCPD Disconnection:     01/27/24 0924   Observations & Evaluations   Level of Consciousness 0   Heart Rhythm Regular   Respiratory Quality/Effort Unlabored   O2 Device None (Room air)   Skin Condition/Temp Dry;Warm   Abdomen Inspection Obese;Soft   Edema None   Peritoneal Dialysis Catheter Mid lower abdomen   No placement date or time found.   Catheter Location: Mid lower abdomen   Status Deaccessed   Site Condition Clean, dry, intact   Dressing Status Clean, dry & intact   Dressing Gauze   Date of Last Dressing Change 01/26/24   Dialysis Type Continuous cycling   Catheter Care Given Yes   Post-Treatment (Cycler)   Average Dwell Time (Hours:Minutes) 1:20   Lost Dwell Time (Hours:Minutes) 0:16   PD Output (mL) 1109 mL  (Initial Drain (1634 ml) + Toat UF (475 ml) - Last Fill (1000 ml) = 1109 ml)     Primary RN SBAR: CYNTHIA Hi, JANES  Comments: Prior to disconnection one minute Alcavis scrub & soak performed. Sterile mini cap applied & transfer set secured to pt abdomen. Old cassette & bags discarded in red biohazard bag.

## 2024-01-27 NOTE — PROGRESS NOTES
[address], [facility] [place] [address]  Phone: [phone]   [phone]    [email or web address]     Nephrology Progress Note    Patient Name : Galilea Aguilar      : 1955     MRN : [id number]  Date of Admission : 2024  Date of Servive : 24    CC:  Follow up for ESRD       Assessment and Plan   ESRD-PD :  - cont CCPD Rx  -  LF added with IP cefepime     PD peritonitis:  - received IP vanco and cefepime yesterday  - daily IP cefepime ordered  - redose IP vanco likely Monday      Severe Hypokalemia   Hypo Mg  - chronic issues  - 2/2 poor nutrition and acute Diarrhea  - replete aggresively     Chronic malnutrition   - dietary consult      Anemia of CKD:  - continue LIS  - repeat CBC ordered  - would give PRBCs today if repeat less than 7     HTN  - BP stable     CAD s/p CABG 23  DM2  COPD  HLD  Prior CVA     Interval History:  Seen and examined.  Resting in bed.  Feeling better.  No cp, sob.  Ongoing nausea and diarrhea. Hgb 6.2.  no active bleeding noted    Review of Systems: Pertinent items are noted in HPI.    Current Medications:   Current Facility-Administered Medications   Medication Dose Route Frequency    Vancomycin Random - Please draw level on  @ 2100, thanks!   Other Once    dianeal lo-brandi 1.5% 2,000 mL with cefepime 1,000 mg solution   IntraPERitoneal Daily    oxyCODONE (ROXICODONE) immediate release tablet 5 mg  5 mg Oral Q4H PRN    fluconazole (DIFLUCAN) in 0.9 % sodium chloride IVPB 200 mg  200 mg IntraVENous Q24H    potassium bicarb-citric acid (EFFER-K) effervescent tablet 40 mEq  40 mEq Oral BID    apixaban (ELIQUIS) tablet 2.5 mg  2.5 mg Oral BID    aspirin chewable tablet 81 mg  81 mg Oral Daily    folic acid (FOLVITE) tablet 1 mg  1 mg Oral Daily    DULoxetine (CYMBALTA) extended release capsule 20 mg  20 mg Oral BID    carvedilol (COREG) tablet 6.25 mg  6.25 mg Oral BID WC    insulin glargine (LANTUS) injection  vial 5 Units  5 Units SubCUTAneous Nightly    melatonin tablet 6 mg  6 mg Oral Nightly PRN    arformoterol 15 mcg-budesonide 0.25 mg neb solution   Nebulization BID RT    montelukast (SINGULAIR) tablet 10 mg  10 mg Oral Nightly    rosuvastatin (CRESTOR) tablet 40 mg  40 mg Oral Daily    sodium chloride flush 0.9 % injection 5-40 mL  5-40 mL IntraVENous 2 times per day    sodium chloride flush 0.9 % injection 5-40 mL  5-40 mL IntraVENous PRN    0.9 % sodium chloride infusion   IntraVENous PRN    acetaminophen (TYLENOL) tablet 650 mg  650 mg Oral Q6H PRN    Or    acetaminophen (TYLENOL) suppository 650 mg  650 mg Rectal Q6H PRN    ondansetron (ZOFRAN) injection 4 mg  4 mg IntraVENous Q6H PRN    vancomycin (VANCOCIN) intermittent dosing (placeholder)   Other RX Placeholder    gentamicin (GARAMYCIN) 0.1 % cream   Topical PRN    dianeal lo-brandi 2.5% 6,000 mL  6,000 mL IntraPERitoneal Q6H    dianeal lo-brandi 2.5% 6,000 mL  6,000 mL IntraPERitoneal Q6H    dianeal lo-brandi 2.5% 6,000 mL  6,000 mL IntraPERitoneal Q6H      Allergies   Allergen Reactions    Fd&C Blue #1 (Brilliant Blue) Hives    Iodinated Contrast Media Hives       Objective:  Vitals:    Vitals:    01/27/24 0400 01/27/24 0430 01/27/24 0600 01/27/24 0816   BP:  113/63  105/60   Pulse: 82 76 85 85   Resp:  18  18   Temp:  98.3 °F (36.8 °C)  98.4 °F (36.9 °C)   TempSrc:  Oral  Oral   SpO2:  97%  97%   Weight:       Height:         Intake and Output:  No intake/output data recorded.  01/25 1901 - 01/27 0700  In: 6000   Out: 5500     Physical Examination:  General: NAD,Conversant   Neck:  Supple, no mass  Resp:  Lungs CTA B/L, no wheezing , normal respiratory effort  CV:  RRR,  no murmur or rub, LE edema  GI:  Soft, NT, + BS, no HS megaly, PD exit site clean  Neurologic:  Non focal  Psych:             AAO x 3 appropriate affect   Skin:  No Rash      []    High complexity decision making was performed  []    Patient is at high-risk of decompensation with multiple organ

## 2024-01-27 NOTE — FLOWSHEET NOTE
CCPD Connection: 01/27/24 1752   Vitals   /65   Temp 98.4 °F (36.9 °C)   Pulse 88   Respirations 20   Observations & Evaluations   Level of Consciousness 0   Oriented X 4   Heart Rhythm   (remote tele)   Respiratory Quality/Effort Unlabored   O2 Device None (Room air)   Bilateral Breath Sounds Clear   Skin Condition/Temp Warm;Dry   Abdomen Inspection Obese;Soft   Edema None   Peritoneal Dialysis Catheter Mid lower abdomen   No placement date or time found.   Catheter Location: Mid lower abdomen   Status Accessed   Site Condition Clean, dry, intact   Dressing Status New dressing applied   Dressing Gauze   Date of Last Dressing Change 01/27/24   Dialysis Type Continuous cycling   Exit Site Condition excellent   Catheter Care Given Yes   Cycler   Verification of Prescription CCPD   Total Volume Programmed 05614 mL   Therapy Time (Hours:Minutes) 9:30   Cycler Type [facility]   Fill Volume 2300 mL   Last Fill Volume 1000 mL   Dextrose Setting Same (Nonextraneal)   Number of Cycles 5   Bag Volume 6000 mL   Number of Bags Used 3   Dianeal Solution Other (Comment)  (Dextrose 2.5% in 6000 mL)   Time Out (time): 1745  Primary RN SBAR: P. Duty, RN  Patient Education: infection prevention  HBsAg/HBsAb: Ordered  Comments: Remained present during initial drain followed by initiation of first fill. Prior to departure, bed in lowest position, call bell and personal belongings within reach. Lines visible, secure, and connections fastened well. PD fluid sample to be collected for ordered cell count and culture upon 1/27 CCPD connection (w/ initial drain).     Start time: 1750   (01/27/24)  Estimated End Time: 0220   (01/28/24)

## 2024-01-27 NOTE — PLAN OF CARE
Problem: Physical Therapy - Adult  Goal: By Discharge: Performs mobility at highest level of function for planned discharge setting.  See evaluation for individualized goals.  Description: FUNCTIONAL STATUS PRIOR TO ADMISSION: Patient was modified independent using a rolling walker within her home and a wheelchair in the community for functional mobility.  Pt endorses having several falls in the past year.  The last fall occurred in early January 2024.     HOME SUPPORT PRIOR TO ADMISSION: The patient lived with her son and required occasional assistance for bathing and dressing.  Pt reports her family would standby when ambulating due to her fall risk.    Physical Therapy Goals  Initiated 1/27/2024  1.  Patient will move from supine to sit and sit to supine in bed with independence within 7 day(s).    2.  Patient will perform sit to stand with supervision within 7 day(s).  3.  Patient will transfer from bed to chair and chair to bed with supervision using the least restrictive device within 7 day(s).  4.  Patient will ambulate with supervision for 100 feet with the least restrictive device within 7 day(s).   5.  Patient will ascend/descend 4 stairs with 1 handrail(s) with contact guard assist within 7 day(s).   Outcome: Progressing   PHYSICAL THERAPY EVALUATION    Patient: Galilea Aguilar (68 y.o. female)  Date: 1/27/2024  Primary Diagnosis: Hypokalemia [E87.6]  Hypomagnesemia [E83.42]  Generalized abdominal pain [R10.84]  ESRD on dialysis (HCC) [N18.6, Z99.2]  Severe sepsis (HCC) [A41.9, R65.20]       Precautions: Restrictions/Precautions: Fall Risk                      ASSESSMENT :   DEFICITS/IMPAIRMENTS:   The patient is limited by decreased functional mobility, independence in ADLs, high-level IADLs, strength, activity tolerance, balance, increased pain levels, and nausea.       Based on the impairments listed above pt presents with fair activity tolerance due to nausea and 9/10 pain in L flank and back.  Pt  demonstrated supine <> sit with supervision.  She stood and side stepped to HOB with CGA and returned to supine due to her nausea.  Encouraged pt to request assistance to be up for meals as tolerated over the weekend.  Will follow up on Monday.     Patient will benefit from skilled intervention to address the above impairments.    Functional Outcome Measure:  The patient scored 20/24 on the [facility] outcome measure.    Cutoff score ?171,2,3 had higher odds of discharging home with home health or need of SNF/IPR.         PLAN :  Recommendations and Planned Interventions:   bed mobility training, transfer training, gait training, patient and family training/education, and therapeutic activities    Frequency/Duration: Patient will be followed by physical therapy to address goals, PT Plan of Care: 5 times/week to address goals.    Recommendation for discharge: (in order for the patient to meet his/her long term goals): Therapy 2 days/week in the home and also see \"other factors to consider\" below for additional discharge concerns/needs or Continue to assess pending progress    Other factors to consider for discharge: high risk for falls, past medical history, CVA 11/01/2023    IF patient discharges home will need the following DME: patient owns DME required for discharge         SUBJECTIVE:   Patient stated “My son lives with me.”    OBJECTIVE DATA SUMMARY:       Past Medical History:   Diagnosis Date    Asthma     CAD (coronary artery disease)     COPD (chronic obstructive pulmonary disease) ([facility])     PCP manages    CVA (cerebral vascular accident) ([facility]) 11/01/2023    Patient presented with right-sided weakness.  Per MRI of the brain on 11/1/2023, acute versus subacute embolic infarctions involving the left MCA territory and right PICA territory    Diabetes mellitus, type 2 ([facility])     ESRD on peritoneal dialysis ([facility])     Eduardo (Tammie-PD nurse) [facility] [phone]- was on hemodialysis M-W-F Eduardo - now on PD    GERD

## 2024-01-27 NOTE — CONSENT
Informed Consent for Blood Component Transfusion Note    I have discussed with the patient the rationale for blood component transfusion; its benefits in treating or preventing fatigue, organ damage, or death; and its risk which includes mild transfusion reactions, rare risk of blood borne infection, or more serious but rare reactions. I have discussed the alternatives to transfusion, including the risk and consequences of not receiving transfusion. The patient had an opportunity to ask questions and had agreed to proceed with transfusion of blood components.    Electronically signed by Chen Baltazar MD on 1/27/24 at 11:54 AM EST

## 2024-01-27 NOTE — PROGRESS NOTES
Pharmacist Note - Vancomycin Dosing  Therapy day 2  Indication: peritonitis  Current regimen: received 1.75 g IV LD on 1/26 @ 0110 with plan to change to IP vancomycin once the level is <15 mcg/mL.     Recent Labs     01/25/24  2255 01/27/24  0039   WBC 9.7 7.8   CREATININE 6.56* 5.80*   BUN 18 17       A random vancomycin level of 18.2 mcg/mL was obtained ~24 hours post LD.     Goal target range Trough 10-15 mcg/mL      Plan: Level >15 mcg/mL. Will draw a follow-up level this evening. Plan to proceed with vancomycin 1.25 g IP once level is <15 mcg/mL. Pharmacy will continue to monitor this patient daily for changes in clinical status and renal function.

## 2024-01-27 NOTE — PROGRESS NOTES
[facility]                                                                                          Hospitalist Progress Note  Chen Baltazar MD (covering daytime 2024)  Office Phone: [phone]        Date of Service:  2024  NAME:  Galilea Aguilar  :  1955  MRN:  [id number]       Admission Summary:   Hpi quoted: \"Galilea Aguilar is a 68 y.o. female who presented to the ED with generalized abdominal pain, intermittent chest pain X 1 week and fatigue.  Additionally reports vomiting and diarrhea X few days.  Denies fevers.  Denies cough or congestion.  Hypotension with PD this week and hypotensive upon arrival in the ED.     At the bedside patient is alert cooperative and interactive with assessment.  No residual deficits from recent stroke in December.  Abdominal pain is still generalized but endorses worse in the flank areas, ongoing nausea.     PMH of CABG in 2023, ESRD on peritoneal dialysis, multiple recurrent small CVA 2023, HTN, suspected a-fib on eliquis, IDDM, HLD, \"       Interval history / Subjective:   Afebrile, hgb drop, ordered occult stool, consented for 1 prbc xfusion, she states she feels the same as yesterday     Assessment & Plan:     Severe Sepsis 2/2 Peritonitis  - evidenced by peritoneal infection, hypotension, elevated lactate, increased respiratory rate on admission  -Fluid resuscitation started in the ED however less than 30 mL/kg secondary to history of ESRD PD  - Lactic acidosis has resolved  -Sepsis reassessment was completed in the ED around 2:45 AM, blood pressure was more stable at 126/60, hemodynamically stable  - resuming Cefepime/Vanc, pharmacy dosing protocol  -ivf      ESRD-PD   - per nephrology  - resume PD    - daily labs      PD peritonitis   - eitology: ? Diarrheal illness vs primary peritonitis  - empiric abx:Cefepime & vanc, fluconazole  - peritoneal fluid sent for cell count and culture     Severe Hypokalemia  (FOLVITE) tablet 1 mg  1 mg Oral Daily    DULoxetine (CYMBALTA) extended release capsule 20 mg  20 mg Oral BID    carvedilol (COREG) tablet 6.25 mg  6.25 mg Oral BID WC    insulin glargine (LANTUS) injection vial 5 Units  5 Units SubCUTAneous Nightly    melatonin tablet 6 mg  6 mg Oral Nightly PRN    arformoterol 15 mcg-budesonide 0.25 mg neb solution   Nebulization BID RT    montelukast (SINGULAIR) tablet 10 mg  10 mg Oral Nightly    rosuvastatin (CRESTOR) tablet 40 mg  40 mg Oral Daily    sodium chloride flush 0.9 % injection 5-40 mL  5-40 mL IntraVENous 2 times per day    sodium chloride flush 0.9 % injection 5-40 mL  5-40 mL IntraVENous PRN    0.9 % sodium chloride infusion   IntraVENous PRN    acetaminophen (TYLENOL) tablet 650 mg  650 mg Oral Q6H PRN    Or    acetaminophen (TYLENOL) suppository 650 mg  650 mg Rectal Q6H PRN    ondansetron (ZOFRAN) injection 4 mg  4 mg IntraVENous Q6H PRN    vancomycin (VANCOCIN) intermittent dosing (placeholder)   Other RX Placeholder    gentamicin (GARAMYCIN) 0.1 % cream   Topical PRN    dianeal lo-brandi 2.5% 6,000 mL  6,000 mL IntraPERitoneal Q6H    dianeal lo-brandi 2.5% 6,000 mL  6,000 mL IntraPERitoneal Q6H    dianeal lo-brandi 2.5% 6,000 mL  6,000 mL IntraPERitoneal Q6H     ______________________________________________________________________  EXPECTED LENGTH OF STAY: 3  ACTUAL LENGTH OF STAY:          1                 Chen Baltazar MD

## 2024-01-28 LAB
ALBUMIN SERPL-MCNC: 2 G/DL (ref 3.5–5)
ANION GAP SERPL CALC-SCNC: 10 MMOL/L (ref 5–15)
APPEARANCE FLD: CLEAR
BACTERIA SPEC CULT: ABNORMAL
BASOPHILS # BLD: 0.1 K/UL (ref 0–0.1)
BASOPHILS NFR BLD: 1 % (ref 0–1)
BUN SERPL-MCNC: 12 MG/DL (ref 6–20)
BUN/CREAT SERPL: 2 (ref 12–20)
CALCIUM SERPL-MCNC: 6.9 MG/DL (ref 8.5–10.1)
CHLORIDE SERPL-SCNC: 101 MMOL/L (ref 97–108)
CO2 SERPL-SCNC: 27 MMOL/L (ref 21–32)
COLOR FLD: COLORLESS
CREAT SERPL-MCNC: 4.87 MG/DL (ref 0.55–1.02)
DIFFERENTIAL METHOD BLD: ABNORMAL
EOSINOPHIL # BLD: 0.2 K/UL (ref 0–0.4)
EOSINOPHIL NFR BLD: 2 % (ref 0–7)
EOSINOPHIL NFR FLD MANUAL: 1 %
ERYTHROCYTE [DISTWIDTH] IN BLOOD BY AUTOMATED COUNT: 15.5 % (ref 11.5–14.5)
GLUCOSE BLD STRIP.AUTO-MCNC: 162 MG/DL (ref 65–117)
GLUCOSE BLD STRIP.AUTO-MCNC: 175 MG/DL (ref 65–117)
GLUCOSE BLD STRIP.AUTO-MCNC: 251 MG/DL (ref 65–117)
GLUCOSE SERPL-MCNC: 178 MG/DL (ref 65–100)
GRAM STN SPEC: ABNORMAL
HBV SURFACE AG SER QL: <0.1 INDEX
HBV SURFACE AG SER QL: NEGATIVE
HCT VFR BLD AUTO: 22.2 % (ref 35–47)
HCT VFR BLD AUTO: 23.5 % (ref 35–47)
HCT VFR BLD AUTO: 23.8 % (ref 35–47)
HEMOCCULT STL QL: NEGATIVE
HGB BLD-MCNC: 7 G/DL (ref 11.5–16)
HGB BLD-MCNC: 7.3 G/DL (ref 11.5–16)
HGB BLD-MCNC: 7.4 G/DL (ref 11.5–16)
IMM GRANULOCYTES # BLD AUTO: 0.1 K/UL (ref 0–0.04)
IMM GRANULOCYTES NFR BLD AUTO: 1 % (ref 0–0.5)
LYMPHOCYTES # BLD: 1 K/UL (ref 0.8–3.5)
LYMPHOCYTES NFR BLD: 7 % (ref 12–49)
LYMPHOCYTES NFR FLD: 33 %
MAGNESIUM SERPL-MCNC: 1.3 MG/DL (ref 1.6–2.4)
MCH RBC QN AUTO: 28.5 PG (ref 26–34)
MCHC RBC AUTO-ENTMCNC: 31.1 G/DL (ref 30–36.5)
MCV RBC AUTO: 91.8 FL (ref 80–99)
MONOCYTES # BLD: 0.6 K/UL (ref 0–1)
MONOCYTES NFR BLD: 5 % (ref 5–13)
MONOS+MACROS NFR FLD: 49 %
NEUTROPHILS NFR FLD: 17 %
NEUTS SEG # BLD: 11.1 K/UL (ref 1.8–8)
NEUTS SEG NFR BLD: 84 % (ref 32–75)
NRBC # BLD: 0 K/UL (ref 0–0.01)
NRBC BLD-RTO: 0 PER 100 WBC
NUC CELL # FLD: 63 /CU MM
PHOSPHATE SERPL-MCNC: 1.3 MG/DL (ref 2.6–4.7)
PLATELET # BLD AUTO: 283 K/UL (ref 150–400)
PMV BLD AUTO: 9.6 FL (ref 8.9–12.9)
POTASSIUM SERPL-SCNC: 3.8 MMOL/L (ref 3.5–5.1)
RBC # BLD AUTO: 2.56 M/UL (ref 3.8–5.2)
RBC # FLD: 4 /CU MM
SERVICE CMNT-IMP: ABNORMAL
SODIUM SERPL-SCNC: 138 MMOL/L (ref 136–145)
SPECIMEN SOURCE FLD: ABNORMAL
VANCOMYCIN SERPL-MCNC: 16.4 UG/ML
WBC # BLD AUTO: 13 K/UL (ref 3.6–11)

## 2024-01-28 PROCEDURE — 6370000000 HC RX 637 (ALT 250 FOR IP): Performed by: HOSPITALIST

## 2024-01-28 PROCEDURE — 6370000000 HC RX 637 (ALT 250 FOR IP): Performed by: NURSE PRACTITIONER

## 2024-01-28 PROCEDURE — 94640 AIRWAY INHALATION TREATMENT: CPT

## 2024-01-28 PROCEDURE — 83735 ASSAY OF MAGNESIUM: CPT

## 2024-01-28 PROCEDURE — 97530 THERAPEUTIC ACTIVITIES: CPT

## 2024-01-28 PROCEDURE — 2060000000 HC ICU INTERMEDIATE R&B

## 2024-01-28 PROCEDURE — 6360000002 HC RX W HCPCS: Performed by: STUDENT IN AN ORGANIZED HEALTH CARE EDUCATION/TRAINING PROGRAM

## 2024-01-28 PROCEDURE — 6360000002 HC RX W HCPCS: Performed by: NURSE PRACTITIONER

## 2024-01-28 PROCEDURE — 2580000003 HC RX 258: Performed by: NURSE PRACTITIONER

## 2024-01-28 PROCEDURE — 6370000000 HC RX 637 (ALT 250 FOR IP): Performed by: INTERNAL MEDICINE

## 2024-01-28 PROCEDURE — 85014 HEMATOCRIT: CPT

## 2024-01-28 PROCEDURE — 97165 OT EVAL LOW COMPLEX 30 MIN: CPT

## 2024-01-28 PROCEDURE — 80202 ASSAY OF VANCOMYCIN: CPT

## 2024-01-28 PROCEDURE — 97535 SELF CARE MNGMENT TRAINING: CPT

## 2024-01-28 PROCEDURE — 6360000002 HC RX W HCPCS: Performed by: INTERNAL MEDICINE

## 2024-01-28 PROCEDURE — 82962 GLUCOSE BLOOD TEST: CPT

## 2024-01-28 PROCEDURE — 82272 OCCULT BLD FECES 1-3 TESTS: CPT

## 2024-01-28 PROCEDURE — 80069 RENAL FUNCTION PANEL: CPT

## 2024-01-28 PROCEDURE — 85025 COMPLETE CBC W/AUTO DIFF WBC: CPT

## 2024-01-28 PROCEDURE — 87340 HEPATITIS B SURFACE AG IA: CPT

## 2024-01-28 PROCEDURE — 2580000003 HC RX 258: Performed by: INTERNAL MEDICINE

## 2024-01-28 PROCEDURE — 89050 BODY FLUID CELL COUNT: CPT

## 2024-01-28 PROCEDURE — 6370000000 HC RX 637 (ALT 250 FOR IP): Performed by: STUDENT IN AN ORGANIZED HEALTH CARE EDUCATION/TRAINING PROGRAM

## 2024-01-28 PROCEDURE — 85018 HEMOGLOBIN: CPT

## 2024-01-28 PROCEDURE — 90945 DIALYSIS ONE EVALUATION: CPT

## 2024-01-28 PROCEDURE — 36415 COLL VENOUS BLD VENIPUNCTURE: CPT

## 2024-01-28 PROCEDURE — A4722 DIALYS SOL FLD VOL > 1999CC: HCPCS | Performed by: INTERNAL MEDICINE

## 2024-01-28 RX ORDER — INSULIN LISPRO 100 [IU]/ML
0-4 INJECTION, SOLUTION INTRAVENOUS; SUBCUTANEOUS
Status: DISCONTINUED | OUTPATIENT
Start: 2024-01-28 | End: 2024-02-05 | Stop reason: HOSPADM

## 2024-01-28 RX ORDER — INSULIN GLARGINE 100 [IU]/ML
6 INJECTION, SOLUTION SUBCUTANEOUS NIGHTLY
Status: DISCONTINUED | OUTPATIENT
Start: 2024-01-28 | End: 2024-02-05 | Stop reason: HOSPADM

## 2024-01-28 RX ORDER — DEXTROSE MONOHYDRATE 100 MG/ML
INJECTION, SOLUTION INTRAVENOUS CONTINUOUS PRN
Status: DISCONTINUED | OUTPATIENT
Start: 2024-01-28 | End: 2024-02-05 | Stop reason: HOSPADM

## 2024-01-28 RX ORDER — INSULIN LISPRO 100 [IU]/ML
0-4 INJECTION, SOLUTION INTRAVENOUS; SUBCUTANEOUS NIGHTLY
Status: DISCONTINUED | OUTPATIENT
Start: 2024-01-28 | End: 2024-02-05 | Stop reason: HOSPADM

## 2024-01-28 RX ADMIN — ASPIRIN 81 MG CHEWABLE TABLET 81 MG: 81 TABLET CHEWABLE at 11:00

## 2024-01-28 RX ADMIN — DULOXETINE HYDROCHLORIDE 20 MG: 20 CAPSULE, DELAYED RELEASE ORAL at 20:46

## 2024-01-28 RX ADMIN — CARVEDILOL 6.25 MG: 3.12 TABLET, FILM COATED ORAL at 17:15

## 2024-01-28 RX ADMIN — POTASSIUM BICARBONATE 40 MEQ: 782 TABLET, EFFERVESCENT ORAL at 20:46

## 2024-01-28 RX ADMIN — APIXABAN 2.5 MG: 5 TABLET, FILM COATED ORAL at 20:46

## 2024-01-28 RX ADMIN — OXYCODONE HYDROCHLORIDE 5 MG: 5 TABLET ORAL at 09:15

## 2024-01-28 RX ADMIN — GENTAMICIN SULFATE: 1 CREAM TOPICAL at 17:40

## 2024-01-28 RX ADMIN — CEFEPIME: 1 INJECTION, POWDER, FOR SOLUTION INTRAMUSCULAR; INTRAVENOUS at 11:36

## 2024-01-28 RX ADMIN — SODIUM CHLORIDE, SODIUM LACTATE, CALCIUM CHLORIDE, MAGNESIUM CHLORIDE AND DEXTROSE 6000 ML: 2.5; 538; 448; 18.3; 5.08 INJECTION, SOLUTION INTRAPERITONEAL at 17:41

## 2024-01-28 RX ADMIN — OXYCODONE HYDROCHLORIDE 5 MG: 5 TABLET ORAL at 18:47

## 2024-01-28 RX ADMIN — OXYCODONE HYDROCHLORIDE 5 MG: 5 TABLET ORAL at 04:56

## 2024-01-28 RX ADMIN — MONTELUKAST 10 MG: 10 TABLET, FILM COATED ORAL at 20:46

## 2024-01-28 RX ADMIN — FLUCONAZOLE IN SODIUM CHLORIDE 200 MG: 2 INJECTION, SOLUTION INTRAVENOUS at 03:16

## 2024-01-28 RX ADMIN — DULOXETINE HYDROCHLORIDE 20 MG: 20 CAPSULE, DELAYED RELEASE ORAL at 09:11

## 2024-01-28 RX ADMIN — INSULIN GLARGINE 6 UNITS: 100 INJECTION, SOLUTION SUBCUTANEOUS at 22:29

## 2024-01-28 RX ADMIN — POTASSIUM BICARBONATE 40 MEQ: 782 TABLET, EFFERVESCENT ORAL at 09:11

## 2024-01-28 RX ADMIN — ROSUVASTATIN 10 MG: 10 TABLET, FILM COATED ORAL at 09:11

## 2024-01-28 RX ADMIN — ARFORMOTEROL TARTRATE: 15 SOLUTION RESPIRATORY (INHALATION) at 19:32

## 2024-01-28 RX ADMIN — SODIUM CHLORIDE, PRESERVATIVE FREE 10 ML: 5 INJECTION INTRAVENOUS at 09:11

## 2024-01-28 RX ADMIN — ARFORMOTEROL TARTRATE: 15 SOLUTION RESPIRATORY (INHALATION) at 07:35

## 2024-01-28 RX ADMIN — CARVEDILOL 6.25 MG: 3.12 TABLET, FILM COATED ORAL at 09:11

## 2024-01-28 RX ADMIN — FOLIC ACID 1 MG: 1 TABLET ORAL at 09:11

## 2024-01-28 RX ADMIN — ONDANSETRON 4 MG: 2 INJECTION INTRAMUSCULAR; INTRAVENOUS at 17:56

## 2024-01-28 RX ADMIN — ONDANSETRON 4 MG: 2 INJECTION INTRAMUSCULAR; INTRAVENOUS at 04:56

## 2024-01-28 RX ADMIN — SODIUM CHLORIDE, PRESERVATIVE FREE 10 ML: 5 INJECTION INTRAVENOUS at 22:53

## 2024-01-28 ASSESSMENT — PAIN SCALES - GENERAL
PAINLEVEL_OUTOF10: 6
PAINLEVEL_OUTOF10: 9
PAINLEVEL_OUTOF10: 8
PAINLEVEL_OUTOF10: 9
PAINLEVEL_OUTOF10: 5

## 2024-01-28 ASSESSMENT — PAIN DESCRIPTION - DESCRIPTORS
DESCRIPTORS: ACHING;CRAMPING
DESCRIPTORS: ACHING
DESCRIPTORS: ACHING

## 2024-01-28 ASSESSMENT — PAIN DESCRIPTION - ONSET
ONSET: PROGRESSIVE
ONSET: PROGRESSIVE

## 2024-01-28 ASSESSMENT — PAIN DESCRIPTION - PAIN TYPE
TYPE: ACUTE PAIN
TYPE: ACUTE PAIN;CHRONIC PAIN

## 2024-01-28 ASSESSMENT — PAIN DESCRIPTION - ORIENTATION
ORIENTATION: LEFT
ORIENTATION: ANTERIOR

## 2024-01-28 ASSESSMENT — PAIN DESCRIPTION - LOCATION
LOCATION: BACK
LOCATION: GENERALIZED
LOCATION: ABDOMEN

## 2024-01-28 ASSESSMENT — PAIN - FUNCTIONAL ASSESSMENT
PAIN_FUNCTIONAL_ASSESSMENT: PREVENTS OR INTERFERES SOME ACTIVE ACTIVITIES AND ADLS
PAIN_FUNCTIONAL_ASSESSMENT: PREVENTS OR INTERFERES SOME ACTIVE ACTIVITIES AND ADLS

## 2024-01-28 ASSESSMENT — PAIN DESCRIPTION - FREQUENCY
FREQUENCY: CONTINUOUS
FREQUENCY: CONTINUOUS

## 2024-01-28 NOTE — PROGRESS NOTES
Pharmacist Note - Vancomycin Dosing  Therapy day 2  Indication: Peritonitis  Current regimen: received 1.75 g IV LD on 1/26 @ 0110 with plan to change to IP vancomycin once the level is <15 mcg/mL     Recent Labs     01/25/24  2255 01/27/24  0039 01/27/24  1026   WBC 9.7 7.8 7.6   CREATININE 6.56* 5.80*  --    BUN 18 17  --        A random vancomycin level of 15.3 mcg/mL was obtained      Goal target range Trough 10-15 mcg/mL      Plan: Continue to hold.  Will order random level w/aml.  Begin Vancomycin IP if level <15 mcg/ml. Pharmacy will continue to monitor this patient daily for changes in clinical status and renal function.    Davidson Graham, BrigidaD

## 2024-01-28 NOTE — FLOWSHEET NOTE
CAPD Abx exchange: 01/28/24 1136   Vitals   /66   Temp 98.8 °F (37.1 °C)   Pulse 89   Respirations 18   Observations & Evaluations   Level of Consciousness 0   Oriented X 4   Heart Rhythm   (remote tele)   Respiratory Quality/Effort Unlabored   O2 Device None (Room air)   Bilateral Breath Sounds Diminished   Skin Condition/Temp Warm;Dry   Abdomen Inspection Obese;Soft   Edema None   Peritoneal Dialysis Catheter Mid lower abdomen   No placement date or time found.   Catheter Location: Mid lower abdomen   Status Accessed   Site Condition Clean, dry, intact   Dressing Status Clean, dry & intact   Dressing Gauze   Date of Last Dressing Change 01/27/24   Dialysis Type Continuous ambulatory   Catheter Care Given Yes   Peritoneal Dialysis (CAPD manual)   Dianeal Solution Dextrose 1.5% in 2000 mL   Dianeal Additive Other (Comment)   Dianeal Antibiotic cefepime 1g   Peritoneal Input Status Started   Peritoneal Output Status Completed   Effluent Appearance Clear;Yellow   Peritoneal Dialysis Volume In (ml) 2000 ml   Dwell Time (Hours:Minutes) 6:00   Effluent Volume Out (mL) 900 ml   Balance This Exchange (mL) -1100 ml     Hepatitis B Surface Ag   Date/Time Value Ref Range Status   01/28/2024 07:09 AM <0.10 Index Final     Hep B S Ab   Date/Time Value Ref Range Status   12/15/2023 05:40 AM <3.10 mIU/mL Final     Time Out (time): 1130  Primary RN SBAR: BLAS Vogt RN  Patient Education: Infection prevention  Comments: One minute scrub/soak with alcavis prior to manual drain followed by fill of cefepime in 2000 ml 1.5% dianeal. Sterile minicap applied and catheter secured to abd.    Start time: 1130   (01/28/24)  Est End Time: 1730   (01/28/24)

## 2024-01-28 NOTE — PLAN OF CARE
Problem: Occupational Therapy - Adult  Goal: By Discharge: Performs self-care activities at highest level of function for planned discharge setting.  See evaluation for individualized goals.  Description: FUNCTIONAL STATUS PRIOR TO ADMISSION:  Patient was ambulatory using RW for in-home mobility and W/C for community distances, with reported in-home fall, as well as, Supervision-Min A for ADL completion  Receives Help From: Family, ADL Assistance: Needs assistance, Bath: Modified independent, Dressing: Modified independent, Grooming: Modified independent , Feeding: Independent, Toileting: Independent,  , Ambulation Assistance: Independent, Transfer Assistance: Independent, Active : No     HOME SUPPORT: Patient lived son who provides Supervision-Min A for ADL completion.    Occupational Therapy Goals:  Initiated 1/28/2024  1.  Patient will perform RW grooming with Supervision within 7 day(s).  2.  Patient will perform seated bathing with Supervision within 7 day(s).  3.  Patient will perform RW lower body dressing with Supervision within 7 day(s).  4.  Patient will perform RW toilet transfers with Supervision  within 7 day(s).  5.  Patient will perform all aspects of RW toileting with Supervision within 7 day(s).  6.  Patient will participate in upper extremity therapeutic exercise/activities with Supervision for 10 minutes within 7 day(s).    7.  Patient will utilize energy conservation techniques during functional activities with no verbal cues within 7 day(s).    Outcome: Progressing    OCCUPATIONAL THERAPY EVALUATION    Patient: Galilea Aguilar (68 y.o. female)  Date: 1/28/2024  Primary Diagnosis: Hypokalemia [E87.6]  Hypomagnesemia [E83.42]  Generalized abdominal pain [R10.84]  ESRD on dialysis (HCC) [N18.6, Z99.2]  Severe sepsis ([facility]) [A41.9, R65.20]         Precautions: Fall Risk                  ASSESSMENT :  The patient is limited by decreased strength/endurance, decreased mobility/balance, decreased

## 2024-01-28 NOTE — FLOWSHEET NOTE
Peritoneal Dialysis Disconnection / [phone]    Primary RN SBAR: Williams RN  Patient Education: access/site care    Hepatitis B Surface Ag   Date/Time Value Ref Range Status   12/15/2023 05:40 AM <0.10 Index Final     Hep B S Ab   Date/Time Value Ref Range Status   12/15/2023 05:40 AM <3.10 mIU/mL Final       01/28/24 0329   Peritoneal Dialysis Catheter Mid lower abdomen   No placement date or time found.   Catheter Location: Mid lower abdomen   Status Deaccessed   Site Condition Clean, dry, intact   Dressing Status Clean, dry & intact   Dressing Gauze   Date of Last Dressing Change 01/27/24   Dialysis Type Continuous cycling   Exit Site Condition excellent   Catheter Care Given Yes   Post-Treatment (Cycler)   Average Dwell Time (Hours:Minutes) 1:16   Lost Dwell Time (Hours:Minutes) 0:39   Effluent Appearance Clear;Yellow   Volume Gain (mL) 781 mL  (ID 1520 ml +  TUF -301 ml - CAPD LF 2000 ml = 781)     Miguelangel RN at bedside to disconnect pt from CCPD tx. Orders, consent, pt, and code status confirmed. Tx completed. Used cassette and bags discarded. Education and pre/post report given to primary RN.    'Low UF' alarms on arrival - pt repositioned and drain resumed as best as possible. Nephrology on call, MARSHA Carolina NP, updated on pt condition and net fluid positive status - no new orders received.    Net fluid gain: 781 ml    Primary RN SBAR: Williams, RN

## 2024-01-28 NOTE — FLOWSHEET NOTE
CCPD Connection: 01/28/24 1745   Vitals   /68   Temp 98.4 °F (36.9 °C)   Pulse 86   Respirations 18   Observations & Evaluations   Level of Consciousness 0   Oriented X 4   Heart Rhythm   (remote tele)   Respiratory Quality/Effort Unlabored   O2 Device None (Room air)   Bilateral Breath Sounds Diminished   Skin Condition/Temp Warm;Dry   Appetite Fair   Abdomen Inspection Obese;Soft   Edema None   Peritoneal Dialysis Catheter Mid lower abdomen   No placement date or time found.   Catheter Location: Mid lower abdomen   Status Accessed   Site Condition Clean, dry, intact   Dressing Status New dressing applied   Dressing Gauze   Date of Last Dressing Change 01/28/24   Dialysis Type Continuous cycling   Exit Site Condition excellent   Catheter Care Given Yes   Cycler   Verification of Prescription CCPD   Informed Consent  Yes   Total Volume Programmed 68102 mL   Therapy Time (Hours:Minutes) 9:30   Cycler Type [facility]   Fill Volume 2300 mL   Last Fill Volume 1000 mL   Dextrose Setting Same (Nonextraneal)   I Drain Alarm 1400 mL   Number of Cycles 5   Bag Volume 6000 mL   Number of Bags Used 3   Dianeal Solution Other (Comment)  (Dextrose 2.5% in 6000 mL)     Hepatitis B Surface Ag   Date/Time Value Ref Range Status   01/28/2024 07:09 AM <0.10 Index Final     Hep B S Ab   Date/Time Value Ref Range Status   12/15/2023 05:40 AM <3.10 mIU/mL Final       Time Out (time): 1730  Primary RN SBAR: BLAS Vogt, RN  Patient Education: infection prevention  Comments: Remained present during initial drain followed by initiation of first fill. Prior to departure, bed in lowest position, call bell and personal belongings within reach. Lines visible, secure, and connections fastened well. PD fluid sample to be collected for ordered cell count and culture upon 1/28 CCPD connection (w/ initial drain).     Start time: 1750   (01/28/24)  Estimated End Time: 0220   (01/29/24)

## 2024-01-28 NOTE — PROGRESS NOTES
Pharmacist Note - Vancomycin Dosing  Therapy day 3  Indication: peritonitis  Current regimen: received 1.75 g IV LD on 1/26 @ 0110 with plan to change to IP vancomycin once the level is <15 mcg/mL    Recent Labs     01/25/24  2255 01/27/24  0039 01/27/24  1026 01/28/24  0709   WBC 9.7 7.8 7.6 13.0*   CREATININE 6.56* 5.80*  --  4.87*   BUN 18 17  --  12     A random vancomycin level of 16.4 mcg/mL was obtained.     Goal target range Trough 10-15 mcg/mL      Plan: Level remains > 15 mcg/mL. Spoke with Dr. Queen to confirm plan going forward - still would like to transition to IP vancomycin once level is < 15 mcg/mL. Will order a follow-up level for the morning. Pharmacy will continue to monitor this patient daily for changes in clinical status and renal function.

## 2024-01-28 NOTE — PROGRESS NOTES
[facility]                                                                                          Hospitalist Progress Note  Ian Cummings MD (covering daytime 2024)  Office Phone: [phone]        Date of Service:  2024  NAME:  Galilea Aguilar  :  1955  MRN:  [id number]       Admission Summary:   Hpi quoted: \"Galilea Aguilar is a 68 y.o. female who presented to the ED with generalized abdominal pain, intermittent chest pain X 1 week and fatigue.  Additionally reports vomiting and diarrhea X few days.  Denies fevers.  Denies cough or congestion.  Hypotension with PD this week and hypotensive upon arrival in the ED.     At the bedside patient is alert cooperative and interactive with assessment.  No residual deficits from recent stroke in December.  Abdominal pain is still generalized but endorses worse in the flank areas, ongoing nausea.     PMH of CABG in 2023, ESRD on peritoneal dialysis, multiple recurrent small CVA 2023, HTN, suspected a-fib on eliquis, IDDM, HLD, \"       Interval history / Subjective:   She has no new complaints today some times her belly hurts      Assessment & Plan:     Severe Sepsis 2/2 Peritonitis  - evidenced by peritoneal infection, hypotension, elevated lactate, increased respiratory rate on admission  -Fluid resuscitation started in the ED however less than 30 mL/kg secondary to history of ESRD PD  - Lactic acidosis has resolved  -Sepsis reassessment was completed in the ED around 2:45 AM, blood pressure was more stable at 126/60, hemodynamically stable  - resuming Cefepime/Vanc, pharmacy dosing protocol  -CONS in peritoneal fluid       ESRD-PD   - per nephrology  - resume PD    - daily labs      PD peritonitis   - empiric abx:Cefepime & vanc, fluconazole Hopefully Dc Diflucan tomorrow if ok with nephrology  - peritoneal fluid sent for cell count and culture     Severe Hypokalemia   Hypo Magnesemia  - chronic issues per

## 2024-01-28 NOTE — PROGRESS NOTES
Labs stable  CONS in blood and peritoneal fluid  Cont IP cefepime + IP vanco  Should be able to redose IP vanco in AM if level < 15  Cell count improving in PD fluid  Will check back in AM  Call with any issues

## 2024-01-29 LAB
ALBUMIN SERPL-MCNC: 1.7 G/DL (ref 3.5–5)
ANION GAP SERPL CALC-SCNC: 10 MMOL/L (ref 5–15)
ANION GAP SERPL CALC-SCNC: 10 MMOL/L (ref 5–15)
BASOPHILS # BLD: 0.1 K/UL (ref 0–0.1)
BASOPHILS NFR BLD: 1 % (ref 0–1)
BUN SERPL-MCNC: 12 MG/DL (ref 6–20)
BUN SERPL-MCNC: 13 MG/DL (ref 6–20)
BUN/CREAT SERPL: 2 (ref 12–20)
BUN/CREAT SERPL: 3 (ref 12–20)
CALCIUM SERPL-MCNC: 6.8 MG/DL (ref 8.5–10.1)
CALCIUM SERPL-MCNC: 7.2 MG/DL (ref 8.5–10.1)
CHLORIDE SERPL-SCNC: 102 MMOL/L (ref 97–108)
CHLORIDE SERPL-SCNC: 102 MMOL/L (ref 97–108)
CO2 SERPL-SCNC: 24 MMOL/L (ref 21–32)
CO2 SERPL-SCNC: 24 MMOL/L (ref 21–32)
CREAT SERPL-MCNC: 4.81 MG/DL (ref 0.55–1.02)
CREAT SERPL-MCNC: 4.84 MG/DL (ref 0.55–1.02)
DIFFERENTIAL METHOD BLD: ABNORMAL
EOSINOPHIL # BLD: 0.2 K/UL (ref 0–0.4)
EOSINOPHIL NFR BLD: 2 % (ref 0–7)
ERYTHROCYTE [DISTWIDTH] IN BLOOD BY AUTOMATED COUNT: 15.3 % (ref 11.5–14.5)
FERRITIN SERPL-MCNC: 957 NG/ML (ref 26–388)
GASTROCULT GAST QL: POSITIVE
GLUCOSE BLD STRIP.AUTO-MCNC: 114 MG/DL (ref 65–117)
GLUCOSE BLD STRIP.AUTO-MCNC: 150 MG/DL (ref 65–117)
GLUCOSE BLD STRIP.AUTO-MCNC: 169 MG/DL (ref 65–117)
GLUCOSE BLD STRIP.AUTO-MCNC: 220 MG/DL (ref 65–117)
GLUCOSE SERPL-MCNC: 215 MG/DL (ref 65–100)
GLUCOSE SERPL-MCNC: 216 MG/DL (ref 65–100)
HCT VFR BLD AUTO: 21.6 % (ref 35–47)
HCT VFR BLD AUTO: 22.3 % (ref 35–47)
HGB BLD-MCNC: 6.7 G/DL (ref 11.5–16)
HGB BLD-MCNC: 7.1 G/DL (ref 11.5–16)
IMM GRANULOCYTES # BLD AUTO: 0.1 K/UL (ref 0–0.04)
IMM GRANULOCYTES NFR BLD AUTO: 1 % (ref 0–0.5)
IRON SATN MFR SERPL: 51 % (ref 20–50)
IRON SERPL-MCNC: 40 UG/DL (ref 35–150)
LYMPHOCYTES # BLD: 1.2 K/UL (ref 0.8–3.5)
LYMPHOCYTES NFR BLD: 12 % (ref 12–49)
MAGNESIUM SERPL-MCNC: 1.3 MG/DL (ref 1.6–2.4)
MCH RBC QN AUTO: 29.1 PG (ref 26–34)
MCHC RBC AUTO-ENTMCNC: 31.8 G/DL (ref 30–36.5)
MCV RBC AUTO: 91.4 FL (ref 80–99)
MONOCYTES # BLD: 0.4 K/UL (ref 0–1)
MONOCYTES NFR BLD: 4 % (ref 5–13)
NEUTS SEG # BLD: 8.3 K/UL (ref 1.8–8)
NEUTS SEG NFR BLD: 80 % (ref 32–75)
NRBC # BLD: 0 K/UL (ref 0–0.01)
NRBC BLD-RTO: 0 PER 100 WBC
PH GAST: ABNORMAL (ref 1.5–3.5)
PHOSPHATE SERPL-MCNC: 1.6 MG/DL (ref 2.6–4.7)
PLATELET # BLD AUTO: 252 K/UL (ref 150–400)
PMV BLD AUTO: 9.9 FL (ref 8.9–12.9)
POTASSIUM SERPL-SCNC: 3.8 MMOL/L (ref 3.5–5.1)
POTASSIUM SERPL-SCNC: 3.8 MMOL/L (ref 3.5–5.1)
RBC # BLD AUTO: 2.44 M/UL (ref 3.8–5.2)
SERVICE CMNT-IMP: ABNORMAL
SERVICE CMNT-IMP: NORMAL
SODIUM SERPL-SCNC: 136 MMOL/L (ref 136–145)
SODIUM SERPL-SCNC: 136 MMOL/L (ref 136–145)
TIBC SERPL-MCNC: 79 UG/DL (ref 250–450)
VANCOMYCIN SERPL-MCNC: 14.7 UG/ML
WBC # BLD AUTO: 10.3 K/UL (ref 3.6–11)

## 2024-01-29 PROCEDURE — 36415 COLL VENOUS BLD VENIPUNCTURE: CPT

## 2024-01-29 PROCEDURE — 85014 HEMATOCRIT: CPT

## 2024-01-29 PROCEDURE — 83540 ASSAY OF IRON: CPT

## 2024-01-29 PROCEDURE — 83735 ASSAY OF MAGNESIUM: CPT

## 2024-01-29 PROCEDURE — 2060000000 HC ICU INTERMEDIATE R&B

## 2024-01-29 PROCEDURE — A4722 DIALYS SOL FLD VOL > 1999CC: HCPCS | Performed by: INTERNAL MEDICINE

## 2024-01-29 PROCEDURE — 6370000000 HC RX 637 (ALT 250 FOR IP): Performed by: STUDENT IN AN ORGANIZED HEALTH CARE EDUCATION/TRAINING PROGRAM

## 2024-01-29 PROCEDURE — 6370000000 HC RX 637 (ALT 250 FOR IP): Performed by: HOSPITALIST

## 2024-01-29 PROCEDURE — 85018 HEMOGLOBIN: CPT

## 2024-01-29 PROCEDURE — 6360000002 HC RX W HCPCS: Performed by: NURSE PRACTITIONER

## 2024-01-29 PROCEDURE — 6370000000 HC RX 637 (ALT 250 FOR IP): Performed by: NURSE PRACTITIONER

## 2024-01-29 PROCEDURE — 80069 RENAL FUNCTION PANEL: CPT

## 2024-01-29 PROCEDURE — 83550 IRON BINDING TEST: CPT

## 2024-01-29 PROCEDURE — 87040 BLOOD CULTURE FOR BACTERIA: CPT

## 2024-01-29 PROCEDURE — 2580000003 HC RX 258: Performed by: NURSE PRACTITIONER

## 2024-01-29 PROCEDURE — 82728 ASSAY OF FERRITIN: CPT

## 2024-01-29 PROCEDURE — 6360000002 HC RX W HCPCS: Performed by: INTERNAL MEDICINE

## 2024-01-29 PROCEDURE — 6360000002 HC RX W HCPCS: Performed by: STUDENT IN AN ORGANIZED HEALTH CARE EDUCATION/TRAINING PROGRAM

## 2024-01-29 PROCEDURE — 90945 DIALYSIS ONE EVALUATION: CPT

## 2024-01-29 PROCEDURE — 80202 ASSAY OF VANCOMYCIN: CPT

## 2024-01-29 PROCEDURE — 82962 GLUCOSE BLOOD TEST: CPT

## 2024-01-29 PROCEDURE — 85025 COMPLETE CBC W/AUTO DIFF WBC: CPT

## 2024-01-29 PROCEDURE — 94640 AIRWAY INHALATION TREATMENT: CPT

## 2024-01-29 PROCEDURE — 80048 BASIC METABOLIC PNL TOTAL CA: CPT

## 2024-01-29 PROCEDURE — 6370000000 HC RX 637 (ALT 250 FOR IP): Performed by: INTERNAL MEDICINE

## 2024-01-29 PROCEDURE — 82271 OCCULT BLOOD OTHER SOURCES: CPT

## 2024-01-29 RX ORDER — MAGNESIUM SULFATE IN WATER 40 MG/ML
2000 INJECTION, SOLUTION INTRAVENOUS ONCE
Status: COMPLETED | OUTPATIENT
Start: 2024-01-29 | End: 2024-01-29

## 2024-01-29 RX ORDER — SODIUM CHLORIDE, SODIUM LACTATE, CALCIUM CHLORIDE, MAGNESIUM CHLORIDE AND DEXTROSE 2.5; 538; 448; 18.3; 5.08 G/100ML; MG/100ML; MG/100ML; MG/100ML; MG/100ML
6000 INJECTION, SOLUTION INTRAPERITONEAL
Status: DISCONTINUED | OUTPATIENT
Start: 2024-01-30 | End: 2024-01-30

## 2024-01-29 RX ORDER — FLUCONAZOLE 100 MG/1
100 TABLET ORAL DAILY
Status: DISCONTINUED | OUTPATIENT
Start: 2024-01-29 | End: 2024-02-05 | Stop reason: HOSPADM

## 2024-01-29 RX ORDER — LIDOCAINE 4 G/G
1 PATCH TOPICAL DAILY
Status: DISCONTINUED | OUTPATIENT
Start: 2024-01-29 | End: 2024-02-05 | Stop reason: HOSPADM

## 2024-01-29 RX ADMIN — DULOXETINE HYDROCHLORIDE 20 MG: 20 CAPSULE, DELAYED RELEASE ORAL at 22:04

## 2024-01-29 RX ADMIN — APIXABAN 2.5 MG: 5 TABLET, FILM COATED ORAL at 22:04

## 2024-01-29 RX ADMIN — ARFORMOTEROL TARTRATE: 15 SOLUTION RESPIRATORY (INHALATION) at 07:20

## 2024-01-29 RX ADMIN — INSULIN LISPRO 1 UNITS: 100 INJECTION, SOLUTION INTRAVENOUS; SUBCUTANEOUS at 08:25

## 2024-01-29 RX ADMIN — DULOXETINE HYDROCHLORIDE 20 MG: 20 CAPSULE, DELAYED RELEASE ORAL at 10:03

## 2024-01-29 RX ADMIN — INSULIN GLARGINE 6 UNITS: 100 INJECTION, SOLUTION SUBCUTANEOUS at 22:04

## 2024-01-29 RX ADMIN — FLUCONAZOLE IN SODIUM CHLORIDE 200 MG: 2 INJECTION, SOLUTION INTRAVENOUS at 03:11

## 2024-01-29 RX ADMIN — OXYCODONE HYDROCHLORIDE 5 MG: 5 TABLET ORAL at 22:09

## 2024-01-29 RX ADMIN — GENTAMICIN SULFATE: 1 CREAM TOPICAL at 20:30

## 2024-01-29 RX ADMIN — ARFORMOTEROL TARTRATE: 15 SOLUTION RESPIRATORY (INHALATION) at 20:20

## 2024-01-29 RX ADMIN — OXYCODONE HYDROCHLORIDE 5 MG: 5 TABLET ORAL at 06:16

## 2024-01-29 RX ADMIN — APIXABAN 2.5 MG: 5 TABLET, FILM COATED ORAL at 10:02

## 2024-01-29 RX ADMIN — POTASSIUM BICARBONATE 40 MEQ: 782 TABLET, EFFERVESCENT ORAL at 22:00

## 2024-01-29 RX ADMIN — MAGNESIUM SULFATE HEPTAHYDRATE 2000 MG: 40 INJECTION, SOLUTION INTRAVENOUS at 11:27

## 2024-01-29 RX ADMIN — ONDANSETRON 4 MG: 2 INJECTION INTRAMUSCULAR; INTRAVENOUS at 08:49

## 2024-01-29 RX ADMIN — SODIUM CHLORIDE, PRESERVATIVE FREE 10 ML: 5 INJECTION INTRAVENOUS at 11:27

## 2024-01-29 RX ADMIN — ONDANSETRON 4 MG: 2 INJECTION INTRAMUSCULAR; INTRAVENOUS at 22:01

## 2024-01-29 RX ADMIN — FLUCONAZOLE 100 MG: 100 TABLET ORAL at 11:27

## 2024-01-29 RX ADMIN — SODIUM CHLORIDE, SODIUM LACTATE, CALCIUM CHLORIDE, MAGNESIUM CHLORIDE AND DEXTROSE 6000 ML: 2.5; 538; 448; 18.3; 5.08 INJECTION, SOLUTION INTRAPERITONEAL at 20:30

## 2024-01-29 RX ADMIN — CARVEDILOL 6.25 MG: 3.12 TABLET, FILM COATED ORAL at 17:57

## 2024-01-29 RX ADMIN — CARVEDILOL 6.25 MG: 3.12 TABLET, FILM COATED ORAL at 10:03

## 2024-01-29 RX ADMIN — SODIUM CHLORIDE, PRESERVATIVE FREE 10 ML: 5 INJECTION INTRAVENOUS at 22:04

## 2024-01-29 RX ADMIN — MONTELUKAST 10 MG: 10 TABLET, FILM COATED ORAL at 22:04

## 2024-01-29 RX ADMIN — CEFEPIME: 1 INJECTION, POWDER, FOR SOLUTION INTRAMUSCULAR; INTRAVENOUS at 10:16

## 2024-01-29 ASSESSMENT — PAIN DESCRIPTION - DESCRIPTORS: DESCRIPTORS: ACHING

## 2024-01-29 ASSESSMENT — PAIN SCALES - GENERAL
PAINLEVEL_OUTOF10: 0
PAINLEVEL_OUTOF10: 9
PAINLEVEL_OUTOF10: 5

## 2024-01-29 ASSESSMENT — PAIN DESCRIPTION - LOCATION
LOCATION: BACK
LOCATION: BACK

## 2024-01-29 ASSESSMENT — PAIN DESCRIPTION - ORIENTATION: ORIENTATION: LEFT

## 2024-01-29 NOTE — FLOWSHEET NOTE
Peritoneal Dialysis Initiation / [phone]     Primary RN SBAR: Williams, RN  Patient Education: access/site care           Hepatitis B Surface Ag   Date/Time Value Ref Range Status   12/15/2023 05:40 AM <0.10 Index Final            Hep B S Ab   Date/Time Value Ref Range Status   12/15/2023 05:40 AM <3.10 mIU/mL Final             01/29/24 0309   Peritoneal Dialysis Catheter Mid lower abdomen   No placement date or time found.   Catheter Location: Mid lower abdomen   Status Deaccessed   Site Condition Clean, dry, intact   Dressing Status Clean, dry & intact   Dressing Gauze   Date of Last Dressing Change 01/28/24   Dialysis Type Continuous cycling   Exit Site Condition excellent   Catheter Care Given Yes   Post-Treatment (Cycler)   Average Dwell Time (Hours:Minutes) 1:13   Lost Dwell Time (Hours:Minutes) 0:43   Effluent Appearance Clear;Yellow   Volume Gain (mL) 350 mL  (ID 1479 +  - CAPD LF 2000 ml =350)     Miguelangel RN at bedside to disconnect pt from CCPD tx. Orders, consent, pt, and code status confirmed. Tx completed. Used cassette and bags discarded. Education and pre/post report given to primary RN.     Net fluid gain: 350 ml     Primary RN SBAR: [place], RN

## 2024-01-29 NOTE — CARE COORDINATION
Transition of Care Plan:    RUR: 34%    Prior Level of Functioning: modified independent using a RW, cane or wheelchair for mobility    Lives with her son. And daughter   Family assists with bathing and dressing as needed    Disposition: home  Home Health   open to   [facility] --referrals sent via [facility] for  PT/OT / SN   CM talked to Lali in intake and patient is accepted for resumption of HH services.  Name and number placed on AVS    If SNF or IPR: Date FOC offered: N/A  Date FOC received:   Accepting facility:   Date authorization started with reference number:   Date authorization received and expires: N/A    Follow up appointments: PCP and specialist     DME needed:  none  has RW cane wheelchair  shower chair and 3 in 1 commode    Transportation at discharge: Family    IM/IMM Medicare/ letter given:   yes 1/27/24   2nd letter will be provided prior to discharge    Caregiver Contact:   Son  Mak Aguilar  [phone]  Daughter Chaz Aguilar [phone]    Discharge Caregiver contacted prior to discharge? Yes will call family    Care Conference needed? No    Barriers to discharge:  medical stability    CM spoke with Marley at [facility] [phone] to inquire as to the status of acceptance for HH..   She said she will have one of the intake workers call CM regarding the status of the referral.      Family will transport patient home.       2nd Medicare letter will be provided prior to discharge.    Update  2:25 pm    Cm talked with Lali at [facility]--She confirmed that patient is open to the agency for SN.. SN was added to the referral.  [facility] can continue to provide home health services     Name and number placed on AVS    KEYUR SCHULZ

## 2024-01-29 NOTE — FLOWSHEET NOTE
CAPD Manual Exchange:     01/29/24 1011   Vitals   /65   Temp 98.4 °F (36.9 °C)   Temp Source Oral   Pulse 94   Respirations 18   SpO2 98 %   Observations & Evaluations   Level of Consciousness 0   Heart Rhythm Regular   Respiratory Quality/Effort Unlabored   O2 Device None (Room air)   Skin Condition/Temp Dry;Warm   Abdomen Inspection Obese;Soft   Edema None   Peritoneal Dialysis Catheter Mid lower abdomen   No placement date or time found.   Catheter Location: Mid lower abdomen   Status Accessed   Site Condition Clean, dry, intact   Dressing Status Clean, dry & intact   Dressing Gauze   Date of Last Dressing Change 01/28/24   Dialysis Type Continuous ambulatory   Catheter Care Given Yes   Peritoneal Dialysis (CAPD manual)   Exchange Number 1   Dianeal Solution Dextrose 1.5% in 2000 mL   Dianeal Antibiotic cefepime   Effluent Appearance Clear;Yellow   Peritoneal Dialysis Volume In (ml) 2000 ml   Dwell Time (Hours:Minutes) 6:00   Effluent Volume Out (mL)   (Manual Drain (1000 ml) - Last Fill (1000 ml) = UF (0 ml))     Primary RN SBAR: BLAS Aldridge, RN  Patient Education: PD cath infection prevention & control.  Hepatitis B Surface Ag   Date/Time Value Ref Range Status   01/28/2024 07:09 AM <0.10 Index Final     Hep B S Ab   Date/Time Value Ref Range Status   12/15/2023 05:40 AM <3.10 mIU/mL Final       Pt orders, notes, labs, code status and consent reviewed. Time out complete.       Left abdominal PD site gauze dressing, CDI dated 1/28/24, no redness or drainage at exit site. Prior to manual exchange, one minute Alcavis scrub & soak performed. Sterile mini cap applied & transfer set secured to pt abdomen with tape.    PD Start Time 1100   PD End Time: 1700

## 2024-01-29 NOTE — PROGRESS NOTES
[address], [place] [address]  Phone: [phone]   [phone]    [email or web address]     Nephrology Progress Note    Patient Name : Galilea Aguilar      : 1955     MRN : [id number]  Date of Admission : 2024  Date of Servive : 24    CC:  Follow up for ESRD       Assessment and Plan   ESRD-PD :  - cont CCPD Rx  - [facility] aware of orders     PD peritonitis:  -  1 of 2 BC +, Repeat BC today   - d/c daily cell count, Last Cell count 63  - PD Cult: Staph Epi  - Plan to restart IP Vanc, vanc level <15,  will need ~ 4 weeks  - D/c IP Cefepime  - Change to oral Diflucan 100 mg QD     Hypokalemia  - K stable on daily K supplement     Hypo Mg  - chronic issues  - 2/2 poor nutrition and acute Diarrhea  - mag 1.3- IV mag ordered     Chronic malnutrition   - dietary consulted     Anemia of CKD:  - continue LIS  - Hgb improving  - transfuse for Hgb <7  - recheck iron stores     HTN  - BP stable     CAD s/p CABG 23  DM2  COPD  HLD  Prior CVA     Interval History:  Seen and examined.  She states she had episode of vomiting earlier in AM. PD going well, denies abdominal discomfort. Vanc level improving. No repeat BC noted.    Review of Systems: Pertinent items are noted in HPI.    Current Medications:   Current Facility-Administered Medications   Medication Dose Route Frequency    insulin lispro (HUMALOG) injection vial 0-4 Units  0-4 Units SubCUTAneous TID WC    insulin lispro (HUMALOG) injection vial 0-4 Units  0-4 Units SubCUTAneous Nightly    glucose chewable tablet 16 g  4 tablet Oral PRN    dextrose bolus 10% 125 mL  125 mL IntraVENous PRN    Or    dextrose bolus 10% 250 mL  250 mL IntraVENous PRN    glucagon (rDNA) injection 1 mg  1 mg SubCUTAneous PRN    dextrose 10 % infusion   IntraVENous Continuous PRN    insulin glargine (LANTUS) injection vial 6 Units  6 Units SubCUTAneous Nightly    dianeal lo-brandi 1.5% 2,000 mL with  0443 01/29/24 0546 01/29/24 0721   BP:  115/65     Pulse: 91 91 92 97   Resp:  18     Temp:  98.2 °F (36.8 °C)     TempSrc:  Oral     SpO2:  100%  98%   Weight:       Height:         Intake and Output:  No intake/output data recorded.  01/27 1901 - 01/29 0700  In: 3251 [P.O.:120]  Out: 900     Physical Examination:  General: NAD,Conversant   Neck:  Supple, no mass  Resp:  Lungs CTA B/L, no wheezing , normal respiratory effort  CV:  RRR,  no murmur or rub, LE edema  GI:  Soft, NT, + BS, no HS megaly, PD exit site clean  Neurologic:  Non focal  Psych:             AAO x 3 appropriate affect   Skin:  No Rash      []    High complexity decision making was performed  []    Patient is at high-risk of decompensation with multiple organ involvement    Lab Data Personally Reviewed: I have reviewed all the pertinent labs, microbiology data and radiology studies during assessment.    Labs:  Recent Labs     01/27/24  0039 01/28/24  0709 01/29/24  0653    138 136   K 3.8 3.8 3.8    101 102   CO2 23 27 24   GLUCOSE 246* 178* 216*   BUN 17 12 13   CREATININE 5.80* 4.87* 4.84*   CALCIUM 6.6* 6.9* 7.2*         Recent Labs     01/27/24  0039 01/27/24  1026 01/27/24  2147 01/28/24  0709 01/28/24  1454 01/28/24  2239   WBC 7.8 7.6  --  13.0*  --   --    RBC 2.16* 2.35*  --  2.56*  --   --    HGB 6.2* 6.7*   < > 7.3* 7.0* 7.4*   HCT 20.0* 21.5*   < > 23.5* 22.2* 23.8*   MCV 92.6 91.5  --  91.8  --   --    MCH 28.7 28.5  --  28.5  --   --    MCHC 31.0 31.2  --  31.1  --   --    RDW 15.7* 15.8*  --  15.5*  --   --     303  --  283  --   --    MPV 9.7 9.3  --  9.6  --   --     < > = values in this interval not displayed.       No results for input(s): \"TP\", \"ALB\", \"GLOB\", \"AML\" in the last 72 hours.    Invalid input(s): \"SGOT\", \"GPT\", \"AP\", \"TBIL\", \"AMYP\", \"LPSE\", \"LAC\"    No results for input(s): \"INR\", \"APTT\" in the last 72 hours.    Invalid input(s): \"PTP\"   No results for input(s): \"CPK\", \"CKMB\", \"TROPONINI\" in the

## 2024-01-29 NOTE — PROGRESS NOTES
[facility]                                                                                          Hospitalist Progress Note  Salomón Nair MD (covering daytime 2024)  Office Phone: [phone]        Date of Service:  2024  NAME:  Galilea Aguilar  :  1955  MRN:  [id number]       Admission Summary:   Hpi quoted: \"Galilea Aguilar is a 68 y.o. female who presented to the ED with generalized abdominal pain, intermittent chest pain X 1 week and fatigue.  Additionally reports vomiting and diarrhea X few days.  Denies fevers.  Denies cough or congestion.  Hypotension with PD this week and hypotensive upon arrival in the ED.     At the bedside patient is alert cooperative and interactive with assessment.  No residual deficits from recent stroke in December.  Abdominal pain is still generalized but endorses worse in the flank areas, ongoing nausea.     PMH of CABG in 2023, ESRD on peritoneal dialysis, multiple recurrent small CVA 2023, HTN, suspected a-fib on eliquis, IDDM, HLD, \"       Interval history / Subjective:     F/u for sepsis , on PD on abx   Pt c/o Left sided back pain , no local changes or lesion     Assessment & Plan:     Severe Sepsis 2/2 Peritonitis  - evidenced by peritoneal infection, hypotension, elevated lactate, increased respiratory rate on admission  -Fluid resuscitation started in the ED however less than 30 mL/kg secondary to history of ESRD PD  - PD Cult: Staph Epi  - Plan to restart IP Vanc, vanc level <15,  will need ~ 4 weeks  - D/c IP Cefepime  - Change to oral Diflucan 100 mg QD      ESRD-PD   - per nephrology  - resume PD    - daily labs      PD peritonitis   - empiric abx:Cefepime & vanc, fluconazole Hopefully   - peritoneal fluid sent for cell count and culture-- heavy staph epi     Severe Hypokalemia   Hypo Magnesemia  - chronic issues per renal  - 2/2 poor nutrition and acute Diarrhea  - replete aggresively     Chronic  PRN    glucagon (rDNA) injection 1 mg  1 mg SubCUTAneous PRN    dextrose 10 % infusion   IntraVENous Continuous PRN    insulin glargine (LANTUS) injection vial 6 Units  6 Units SubCUTAneous Nightly    dianeal lo-brandi 1.5% 2,000 mL with cefepime 1,000 mg solution   IntraPERitoneal Daily    epoetin mohan (EPOGEN;PROCRIT) injection 20,000 Units  20,000 Units SubCUTAneous Once per day on Tue Thu Sat    0.9 % sodium chloride infusion   IntraVENous PRN    rosuvastatin (CRESTOR) tablet 10 mg  10 mg Oral Daily    dianeal lo-brandi 2.5% 6,000 mL  6,000 mL IntraPERitoneal Daily before dinner    dianeal lo-brandi 2.5% 6,000 mL  6,000 mL IntraPERitoneal Daily before dinner    dianeal lo-brandi 2.5% 6,000 mL  6,000 mL IntraPERitoneal Daily before dinner    oxyCODONE (ROXICODONE) immediate release tablet 5 mg  5 mg Oral Q4H PRN    fluconazole (DIFLUCAN) in 0.9 % sodium chloride IVPB 200 mg  200 mg IntraVENous Q24H    potassium bicarb-citric acid (EFFER-K) effervescent tablet 40 mEq  40 mEq Oral BID    apixaban (ELIQUIS) tablet 2.5 mg  2.5 mg Oral BID    aspirin chewable tablet 81 mg  81 mg Oral Daily    folic acid (FOLVITE) tablet 1 mg  1 mg Oral Daily    DULoxetine (CYMBALTA) extended release capsule 20 mg  20 mg Oral BID    carvedilol (COREG) tablet 6.25 mg  6.25 mg Oral BID WC    melatonin tablet 6 mg  6 mg Oral Nightly PRN    arformoterol 15 mcg-budesonide 0.25 mg neb solution   Nebulization BID RT    montelukast (SINGULAIR) tablet 10 mg  10 mg Oral Nightly    sodium chloride flush 0.9 % injection 5-40 mL  5-40 mL IntraVENous 2 times per day    sodium chloride flush 0.9 % injection 5-40 mL  5-40 mL IntraVENous PRN    0.9 % sodium chloride infusion   IntraVENous PRN    acetaminophen (TYLENOL) tablet 650 mg  650 mg Oral Q6H PRN    Or    acetaminophen (TYLENOL) suppository 650 mg  650 mg Rectal Q6H PRN    ondansetron (ZOFRAN) injection 4 mg  4 mg IntraVENous Q6H PRN    vancomycin (VANCOCIN) intermittent dosing (placeholder)   Other RX

## 2024-01-29 NOTE — PROGRESS NOTES
Pharmacist Note - Vancomycin Dosing  Therapy day 4  Indication: peritonitis  Current regimen: received 1.75 g IV loading dose on  @ 0110 with plan to change to IP vancomycin once the level is <15 mcg/mL    Recent Labs     24  1026 24  0709 24  0653 24  0655   WBC 7.6 13.0* 10.3  --    CREATININE  --  4.87* 4.84* 4.81*   BUN  --  12 13 12     A random vancomycin level of 14.7mcg/mL was obtained.     Goal target range Trough 10-15 mcg/mL      Plan: Transition to IP vanc with vanc 1.25g (15-20 mg/kg) in one bag of PD today.     --------------    Day #4 of Vancomycin  Indication:  peritonitis  Current regimen:  vanc 1.75g x1 loading dose ( @ 0110)  Abx regimen:  IP vanc/fluconazole  ID Following ?: NO  Concomitant nephrotoxic drugs (requires more frequent monitoring): None  Frequency of BMP?: daily through     Recent Labs     24  1026 24  0709 24  0653 24  0655   WBC 7.6 13.0* 10.3  --    CREATININE  --  4.87* 4.84* 4.81*   BUN  --  12 13 12     Est CrCl: PD  Temp (24hrs), Av.3 °F (36.8 °C), Min:98.1 °F (36.7 °C), Max:98.6 °F (37 °C)    Cultures:    blood #1: NG x3 days; prelim   blood #2: CoNS in ; final      [employer]: staph epidermidis, mecA/C detected   peritoneal fluid: heavy staph epi (oxacillin resistant), final   blood: in process    MRSA Swab ordered (if applicable)? N/A    Goal target range Trough 10-15 mcg/mL    Recent level history:  Date/Time Dose & Interval Measured Level (mcg/mL) Associated AUC/EMERSON Dose Adjustment     @ 0111 Vanc 1.75g x1 -- -- Change to IP once serum levels ~15 mcg/ml    @ 0039 1.75 g IV LD on  @ 0110  18.2 -- Repeat level this evening    @ 2147 1.75 g IV LD on  @ 0110  15.3 -- Repeat level     @ 0709 1.75 g IV LD on  @ 0110  16.4 --     @ 0653   14.7 -- Change to IP             Plan: Add vanc 1.25g into one PD bag today. Will plan to obtain random levels ~48h-72h after

## 2024-01-30 LAB
ALBUMIN SERPL-MCNC: 1.6 G/DL (ref 3.5–5)
ANION GAP SERPL CALC-SCNC: 11 MMOL/L (ref 5–15)
BACTERIA SPEC CULT: NORMAL
BUN SERPL-MCNC: ABNORMAL MG/DL (ref 6–20)
BUN/CREAT SERPL: ABNORMAL (ref 12–20)
CALCIUM SERPL-MCNC: 7.2 MG/DL (ref 8.5–10.1)
CHLORIDE SERPL-SCNC: 100 MMOL/L (ref 97–108)
CO2 SERPL-SCNC: 27 MMOL/L (ref 21–32)
CREAT SERPL-MCNC: 4.73 MG/DL (ref 0.55–1.02)
ERYTHROCYTE [DISTWIDTH] IN BLOOD BY AUTOMATED COUNT: 15.5 % (ref 11.5–14.5)
GLUCOSE BLD STRIP.AUTO-MCNC: 146 MG/DL (ref 65–117)
GLUCOSE BLD STRIP.AUTO-MCNC: 199 MG/DL (ref 65–117)
GLUCOSE BLD STRIP.AUTO-MCNC: 269 MG/DL (ref 65–117)
GLUCOSE BLD STRIP.AUTO-MCNC: 290 MG/DL (ref 65–117)
GLUCOSE SERPL-MCNC: 268 MG/DL (ref 65–100)
HCT VFR BLD AUTO: 21.7 % (ref 35–47)
HGB BLD-MCNC: 6.8 G/DL (ref 11.5–16)
HISTORY CHECK: NORMAL
MAGNESIUM SERPL-MCNC: 1.6 MG/DL (ref 1.6–2.4)
MCH RBC QN AUTO: 28.3 PG (ref 26–34)
MCHC RBC AUTO-ENTMCNC: 31.3 G/DL (ref 30–36.5)
MCV RBC AUTO: 90.4 FL (ref 80–99)
NRBC # BLD: 0 K/UL (ref 0–0.01)
NRBC BLD-RTO: 0 PER 100 WBC
PHOSPHATE SERPL-MCNC: 1.8 MG/DL (ref 2.6–4.7)
PLATELET # BLD AUTO: 273 K/UL (ref 150–400)
PMV BLD AUTO: 9.8 FL (ref 8.9–12.9)
POTASSIUM SERPL-SCNC: 3.1 MMOL/L (ref 3.5–5.1)
RBC # BLD AUTO: 2.4 M/UL (ref 3.8–5.2)
SERVICE CMNT-IMP: ABNORMAL
SERVICE CMNT-IMP: NORMAL
SODIUM SERPL-SCNC: 138 MMOL/L (ref 136–145)
WBC # BLD AUTO: 7.4 K/UL (ref 3.6–11)

## 2024-01-30 PROCEDURE — 6370000000 HC RX 637 (ALT 250 FOR IP): Performed by: NURSE PRACTITIONER

## 2024-01-30 PROCEDURE — 2060000000 HC ICU INTERMEDIATE R&B

## 2024-01-30 PROCEDURE — 2580000003 HC RX 258: Performed by: NURSE PRACTITIONER

## 2024-01-30 PROCEDURE — 6360000002 HC RX W HCPCS: Performed by: INTERNAL MEDICINE

## 2024-01-30 PROCEDURE — 6360000002 HC RX W HCPCS: Performed by: NURSE PRACTITIONER

## 2024-01-30 PROCEDURE — 36430 TRANSFUSION BLD/BLD COMPNT: CPT

## 2024-01-30 PROCEDURE — 90945 DIALYSIS ONE EVALUATION: CPT

## 2024-01-30 PROCEDURE — 94640 AIRWAY INHALATION TREATMENT: CPT

## 2024-01-30 PROCEDURE — 2500000003 HC RX 250 WO HCPCS: Performed by: NURSE PRACTITIONER

## 2024-01-30 PROCEDURE — 80069 RENAL FUNCTION PANEL: CPT

## 2024-01-30 PROCEDURE — P9016 RBC LEUKOCYTES REDUCED: HCPCS

## 2024-01-30 PROCEDURE — 6370000000 HC RX 637 (ALT 250 FOR IP): Performed by: INTERNAL MEDICINE

## 2024-01-30 PROCEDURE — 2580000003 HC RX 258: Performed by: INTERNAL MEDICINE

## 2024-01-30 PROCEDURE — 83735 ASSAY OF MAGNESIUM: CPT

## 2024-01-30 PROCEDURE — A4722 DIALYS SOL FLD VOL > 1999CC: HCPCS | Performed by: INTERNAL MEDICINE

## 2024-01-30 PROCEDURE — 85027 COMPLETE CBC AUTOMATED: CPT

## 2024-01-30 PROCEDURE — 36415 COLL VENOUS BLD VENIPUNCTURE: CPT

## 2024-01-30 PROCEDURE — 6370000000 HC RX 637 (ALT 250 FOR IP): Performed by: HOSPITALIST

## 2024-01-30 PROCEDURE — 6370000000 HC RX 637 (ALT 250 FOR IP): Performed by: STUDENT IN AN ORGANIZED HEALTH CARE EDUCATION/TRAINING PROGRAM

## 2024-01-30 PROCEDURE — 82962 GLUCOSE BLOOD TEST: CPT

## 2024-01-30 RX ORDER — SODIUM CHLORIDE, SODIUM LACTATE, CALCIUM CHLORIDE, MAGNESIUM CHLORIDE AND DEXTROSE 2.5; 538; 448; 18.3; 5.08 G/100ML; MG/100ML; MG/100ML; MG/100ML; MG/100ML
6000 INJECTION, SOLUTION INTRAPERITONEAL
Status: DISCONTINUED | OUTPATIENT
Start: 2024-01-30 | End: 2024-02-05 | Stop reason: HOSPADM

## 2024-01-30 RX ORDER — SODIUM CHLORIDE 9 MG/ML
INJECTION, SOLUTION INTRAVENOUS PRN
Status: DISCONTINUED | OUTPATIENT
Start: 2024-01-30 | End: 2024-02-05 | Stop reason: HOSPADM

## 2024-01-30 RX ADMIN — DIBASIC SODIUM PHOSPHATE, MONOBASIC POTASSIUM PHOSPHATE AND MONOBASIC SODIUM PHOSPHATE 2 TABLET: 852; 155; 130 TABLET ORAL at 21:16

## 2024-01-30 RX ADMIN — POTASSIUM BICARBONATE 60 MEQ: 782 TABLET, EFFERVESCENT ORAL at 21:16

## 2024-01-30 RX ADMIN — Medication 6 MG: at 21:16

## 2024-01-30 RX ADMIN — VANCOMYCIN HYDROCHLORIDE: 1 INJECTION, POWDER, LYOPHILIZED, FOR SOLUTION INTRAVENOUS at 10:13

## 2024-01-30 RX ADMIN — DULOXETINE HYDROCHLORIDE 20 MG: 20 CAPSULE, DELAYED RELEASE ORAL at 21:16

## 2024-01-30 RX ADMIN — GENTAMICIN SULFATE: 1 CREAM TOPICAL at 16:49

## 2024-01-30 RX ADMIN — ERYTHROPOIETIN 20000 UNITS: 20000 INJECTION, SOLUTION INTRAVENOUS; SUBCUTANEOUS at 21:16

## 2024-01-30 RX ADMIN — OXYCODONE HYDROCHLORIDE 5 MG: 5 TABLET ORAL at 12:18

## 2024-01-30 RX ADMIN — SODIUM CHLORIDE, PRESERVATIVE FREE 10 ML: 5 INJECTION INTRAVENOUS at 08:38

## 2024-01-30 RX ADMIN — INSULIN LISPRO 3 UNITS: 100 INJECTION, SOLUTION INTRAVENOUS; SUBCUTANEOUS at 08:37

## 2024-01-30 RX ADMIN — INSULIN GLARGINE 6 UNITS: 100 INJECTION, SOLUTION SUBCUTANEOUS at 21:37

## 2024-01-30 RX ADMIN — FLUCONAZOLE 100 MG: 100 TABLET ORAL at 08:37

## 2024-01-30 RX ADMIN — SODIUM CHLORIDE, SODIUM LACTATE, CALCIUM CHLORIDE, MAGNESIUM CHLORIDE AND DEXTROSE 6000 ML: 2.5; 538; 448; 18.3; 5.08 INJECTION, SOLUTION INTRAPERITONEAL at 16:50

## 2024-01-30 RX ADMIN — ARFORMOTEROL TARTRATE: 15 SOLUTION RESPIRATORY (INHALATION) at 07:37

## 2024-01-30 RX ADMIN — POTASSIUM PHOSPHATE, MONOBASIC 500 MG: 500 TABLET, SOLUBLE ORAL at 12:18

## 2024-01-30 RX ADMIN — OXYCODONE HYDROCHLORIDE 5 MG: 5 TABLET ORAL at 19:10

## 2024-01-30 RX ADMIN — ONDANSETRON 4 MG: 2 INJECTION INTRAMUSCULAR; INTRAVENOUS at 08:46

## 2024-01-30 RX ADMIN — ROSUVASTATIN 10 MG: 10 TABLET, FILM COATED ORAL at 08:37

## 2024-01-30 RX ADMIN — ONDANSETRON 4 MG: 2 INJECTION INTRAMUSCULAR; INTRAVENOUS at 21:46

## 2024-01-30 RX ADMIN — POTASSIUM BICARBONATE 40 MEQ: 782 TABLET, EFFERVESCENT ORAL at 08:37

## 2024-01-30 RX ADMIN — APIXABAN 2.5 MG: 5 TABLET, FILM COATED ORAL at 08:37

## 2024-01-30 RX ADMIN — CARVEDILOL 6.25 MG: 3.12 TABLET, FILM COATED ORAL at 08:37

## 2024-01-30 RX ADMIN — MONTELUKAST 10 MG: 10 TABLET, FILM COATED ORAL at 21:15

## 2024-01-30 RX ADMIN — CARVEDILOL 6.25 MG: 3.12 TABLET, FILM COATED ORAL at 18:43

## 2024-01-30 RX ADMIN — SODIUM CHLORIDE, PRESERVATIVE FREE 10 ML: 5 INJECTION INTRAVENOUS at 21:19

## 2024-01-30 RX ADMIN — ARFORMOTEROL TARTRATE: 15 SOLUTION RESPIRATORY (INHALATION) at 20:02

## 2024-01-30 RX ADMIN — FOLIC ACID 1 MG: 1 TABLET ORAL at 08:37

## 2024-01-30 RX ADMIN — DULOXETINE HYDROCHLORIDE 20 MG: 20 CAPSULE, DELAYED RELEASE ORAL at 08:37

## 2024-01-30 RX ADMIN — ASPIRIN 81 MG CHEWABLE TABLET 81 MG: 81 TABLET CHEWABLE at 08:37

## 2024-01-30 ASSESSMENT — PAIN DESCRIPTION - ORIENTATION
ORIENTATION: POSTERIOR
ORIENTATION: LEFT;POSTERIOR

## 2024-01-30 ASSESSMENT — PAIN DESCRIPTION - LOCATION
LOCATION: BACK
LOCATION: BACK

## 2024-01-30 ASSESSMENT — PAIN SCALES - GENERAL
PAINLEVEL_OUTOF10: 7
PAINLEVEL_OUTOF10: 9

## 2024-01-30 ASSESSMENT — PAIN DESCRIPTION - DESCRIPTORS
DESCRIPTORS: ACHING
DESCRIPTORS: ACHING

## 2024-01-30 NOTE — FLOWSHEET NOTE
01/30/24 1645   Observations & Evaluations   Level of Consciousness 0   Oriented X 4   Heart Rhythm Regular   Respiratory Quality/Effort Unlabored   O2 Device None (Room air)   Bilateral Breath Sounds Clear   Skin Color   (appropriate for ethnicity)   Skin Condition/Temp Warm;Dry   Appetite Fair   Abdomen Inspection Soft;Rounded   Bowel Sounds (All Quadrants) Active   Edema None   Peritoneal Dialysis Catheter Mid lower abdomen   No placement date or time found.   Catheter Location: Mid lower abdomen   Status Accessed   Site Condition Clean, dry, intact   Dressing Status New dressing applied   Dressing Gauze   Date of Last Dressing Change 01/30/24   Dialysis Type Continuous cycling   Exit Site Condition good   Catheter Care Given Yes   Cycler   Verification of Prescription CCPD   Informed Consent    (chronic consent applied)   Total Volume Programmed 40175 mL   Therapy Time (Hours:Minutes) 9:30   Cycler Type Jung HomeChoice   Fill Volume 2300 mL   Last Fill Volume 1000 mL   Dextrose Setting Same (Nonextraneal)   I Drain Alarm 1400 mL   Number of Cycles 5   Bag Volume 6000 mL   Number of Bags Used 3   Dianeal Solution   (3--2.5% Dianeal in 6000ml)     Primary RN SBAR: RUPA Padilla RN  Patient Education: Procedure, plan of care    CCPD initiated as per md order. Remained present during initial drain followed by initiation of first fill.   Hepatitis B Surface Ag   Date/Time Value Ref Range Status   01/28/2024 07:09 AM <0.10 Index Final     Hep B S Ab   Date/Time Value Ref Range Status   12/15/2023 05:40 AM <3.10 mIU/mL Final      01/30/24 1645   Treatment   Time On 1645   Time Off 0215   Observations & Evaluations   Level of Consciousness 0   Oriented X 4   Heart Rhythm Regular   Respiratory Quality/Effort Unlabored   O2 Device None (Room air)   Bilateral Breath Sounds Clear   Skin Color   (appropriate for ethnicity)   Skin Condition/Temp Warm;Dry   Appetite Fair   Abdomen Inspection Soft;Rounded   Bowel Sounds  (All Quadrants) Active   Edema None   Technical Checks   ICEBOAT I;C;E;B;O;A;T      01/30/24 1536   Vital Signs   /68   Temp 98 °F (36.7 °C)   Pulse 91   Respirations 20   SpO2 96 %

## 2024-01-30 NOTE — FLOWSHEET NOTE
01/30/24 1020   Observations & Evaluations   Level of Consciousness 0   Oriented X 4   Heart Rhythm Regular   Respiratory Quality/Effort Unlabored   O2 Device None (Room air)   Skin Color   (appropriate for ethnicity)   Skin Condition/Temp Warm;Dry   Appetite Fair   Abdomen Inspection Soft;Rounded   Bowel Sounds (All Quadrants) Active   Edema None   Peritoneal Dialysis Catheter Mid lower abdomen   No placement date or time found.   Catheter Location: Mid lower abdomen   Status Accessed   Dressing Status Clean, dry & intact   Dressing Gauze   Date of Last Dressing Change 01/29/24   Dialysis Type Continuous ambulatory   Catheter Care Given Yes   Drainage Description Clear;Yellow   Peritoneal Dialysis (CAPD manual)   Dianeal Solution Dextrose 2.5% in 2000 mL   Dianeal Additive Other (Comment)   Dianeal Antibiotic Vanc 1250mg   Peritoneal Input Status Completed   Peritoneal Output Status Completed   Effluent Appearance Clear;Yellow   Peritoneal Dialysis Volume In (ml)   (2000ml)   Dwell Time (Hours:Minutes) 6:00   Cycler   Verification of Prescription   (CAPD)   Informed Consent    (Chronic consent applies)        01/30/24 1020   Treatment   Time On 1020   Time Off 1620   Observations & Evaluations   Level of Consciousness 0   Oriented X 4   Heart Rhythm Regular   Respiratory Quality/Effort Unlabored   O2 Device None (Room air)   Skin Color   (appropriate for ethnicity)   Skin Condition/Temp Warm;Dry   Appetite Fair   Abdomen Inspection Soft;Rounded   Bowel Sounds (All Quadrants) Active   Edema None   Pain Assessment   Pain Assessment None - Denies Pain   Technical Checks   ICEBOAT I;C;E;B;O;A;T     Primary RN SBAR: RUPA Padilla, RN  Patient Education: Procedural, reviewed plan of care to include manual exchange today    Updated HepB Ab entered in Epic    CAPD performed as per MD order. Will dwell 6 hrs followed by CCPD connection by Miguelangel mendoza.   Pt tolerated well, no issues to report.  Upon departure, bed in  lowest position, call bell and personal belonging within reach.

## 2024-01-30 NOTE — PROGRESS NOTES
[facility]                                                                                          Hospitalist Progress Note  Salomón Nair MD (covering daytime 2024)  Office Phone: [phone]        Date of Service:  2024  NAME:  Galilea Aguilar  :  1955  MRN:  [id number]       Admission Summary:   Hpi quoted: \"Galilea Aguilar is a 68 y.o. female who presented to the ED with generalized abdominal pain, intermittent chest pain X 1 week and fatigue.  Additionally reports vomiting and diarrhea X few days.  Denies fevers.  Denies cough or congestion.  Hypotension with PD this week and hypotensive upon arrival in the ED.     At the bedside patient is alert cooperative and interactive with assessment.  No residual deficits from recent stroke in December.  Abdominal pain is still generalized but endorses worse in the flank areas, ongoing nausea.     PMH of CABG in 2023, ESRD on peritoneal dialysis, multiple recurrent small CVA 2023, HTN, suspected a-fib on eliquis, IDDM, HLD, \"       Interval history / Subjective:     F/u for sepsis , on PD on abx HGB drifted down < 7   Ordered 1 units of BT d/w RN   D/w nephrology as well , possibly GIB , ON ELIQUIS consulted GI   Pt c/o Left sided back pain , no local changes or lesion     Assessment & Plan:     Severe Sepsis 2/2 Peritonitis  - evidenced by peritoneal infection, hypotension, elevated lactate, increased respiratory rate on admission  -Fluid resuscitation started in the ED however less than 30 mL/kg secondary to history of ESRD PD  - PD Cult: Staph Epi Blood cultures CoNS   - Plan to restart IP Vanc,  will need ~ 4 weeks  - D/c IP Cefepime  - Change to oral Diflucan 100 mg QD    Chronic anemia ? Anemia of CKD vs others  - continue LIS  - hgb drop, held eliquis, trend, transfuse for hgb less than 7, FOBT positive   -- FOBT positive   -- on eliquis will hold  -- con GI   -- cont PPI  -- ?AVM slow  input(s): \"CPKMB\", \"CKNDX\", \"TROIQ\"  Lab Results   Component Value Date/Time    CHOL 123 12/14/2023 06:22 AM    HDL 38 12/14/2023 06:22 AM     Lab Results   Component Value Date/Time    GLUCPOC 159 01/09/2023 03:20 PM    GLUCPOC 166 01/09/2023 03:05 PM     [unfilled]    Notes reviewed from all clinical/nonclinical/nursing services involved in patient's clinical care. Care coordination discussions were held with appropriate clinical/nonclinical/ nursing providers based on care coordination needs.         Patients current active Medications were reviewed, considered, added and adjusted based on the clinical condition today.      Home Medications were reconciled to the best of my ability given all available resources at the time of admission. Route is PO if not otherwise noted.      Admission Status:[id number]:::1}      Medications Reviewed:     Current Facility-Administered Medications   Medication Dose Route Frequency    0.9 % sodium chloride infusion   IntraVENous PRN    dianeal lo-brandi (ULTRABAG) 2.5% 6,000 mL  6,000 mL IntraPERitoneal Daily before dinner    fluconazole (DIFLUCAN) tablet 100 mg  100 mg Oral Daily    lidocaine 4 % external patch 1 patch  1 patch TransDERmal Daily    insulin lispro (HUMALOG) injection vial 0-4 Units  0-4 Units SubCUTAneous TID WC    insulin lispro (HUMALOG) injection vial 0-4 Units  0-4 Units SubCUTAneous Nightly    glucose chewable tablet 16 g  4 tablet Oral PRN    dextrose bolus 10% 125 mL  125 mL IntraVENous PRN    Or    dextrose bolus 10% 250 mL  250 mL IntraVENous PRN    glucagon (rDNA) injection 1 mg  1 mg SubCUTAneous PRN    dextrose 10 % infusion   IntraVENous Continuous PRN    insulin glargine (LANTUS) injection vial 6 Units  6 Units SubCUTAneous Nightly    epoetin mohan (EPOGEN;PROCRIT) injection 20,000 Units  20,000 Units SubCUTAneous Once per day on Tue Thu Sat    0.9 % sodium chloride infusion   IntraVENous PRN    rosuvastatin (CRESTOR) tablet 10 mg  10 mg Oral Daily    dianeal

## 2024-01-30 NOTE — PROGRESS NOTES
[address], [place] [address]  Phone: [phone]   [phone]    [email or web address]     Nephrology Progress Note    Patient Name : Galilea Aguilar      : 1955     MRN : [id number]  Date of Admission : 2024  Date of Servive : 24    CC:  Follow up for ESRD       Assessment and Plan   ESRD-PD :  - cont CCPD Rx  - [facility] aware of orders     PD peritonitis:  -  1 of 2 BC +, Repeat BC today   - d/c daily cell count, Last Cell count 63  - PD Cult: Staph Epi  - IP Vanc as mid day exchange, vanc level <15,  will need ~ 4 weeks  - Change to oral Diflucan 100 mg QD     Hypokalemia  - K 3.1-  - KCL increased 60 meq BID    Hypophos  - add Kphos 500 mg QID  - dietary consult for nutritional recommendations     Hypo Mg  - chronic issues  - 2/2 poor nutrition and acute Diarrhea  - last mag 1.3-  recheck today     Chronic malnutrition   - dietary consulted     Anemia of CKD:  - continue LIS 20K TTS  - Hgb 6.8   - transfuse for Hgb <7  - tsat 51% with ferritin 957  - request B12 and folate  - stool for occult blood  - eliquis on hold     HTN  - BP stable     CAD s/p CABG 23  DM2  COPD  HLD  Prior CVA     Interval History:  Seen and examined this AM- Dialysis nurse present.  Patient did not receive IP abx yesterday- due for IP Vanc today as mid day exchange.  No further vomiting- minimal UF  with PD    Review of Systems: Pertinent items are noted in HPI.    Current Medications:   Current Facility-Administered Medications   Medication Dose Route Frequency    0.9 % sodium chloride infusion   IntraVENous PRN    fluconazole (DIFLUCAN) tablet 100 mg  100 mg Oral Daily    dianeal lo-brandi 2.5% 6,000 mL  6,000 mL IntraPERitoneal Daily before dinner    lidocaine 4 % external patch 1 patch  1 patch TransDERmal Daily    insulin lispro (HUMALOG) injection vial 0-4 Units  0-4 Units SubCUTAneous TID WC    insulin lispro (HUMALOG) injection vial

## 2024-01-30 NOTE — PROGRESS NOTES
Occupational Therapy  01/30/24    Chart reviewed up to this date. Pt receiving bedside HD at this time.OT will defer and continue to follow up as able/medically appropriate.     Farrah Kahn, [id number], OTR/L

## 2024-01-30 NOTE — FLOWSHEET NOTE
01/29/24 2030   Observations & Evaluations   Level of Consciousness 0   Oriented X 4   Heart Rhythm Regular   Respiratory Quality/Effort Unlabored   O2 Device None (Room air)   Skin Color Other (comment)  (Normal for ethnicity)   Skin Condition/Temp Warm;Dry   Abdomen Inspection Soft;Obese   Edema None   Peritoneal Dialysis Catheter Mid lower abdomen   No placement date or time found.   Catheter Location: Mid lower abdomen   Status Accessed   Site Condition Clean, dry, intact   Dressing Status Clean, dry & intact   Dressing Gauze  (Exit site cleansed with Exsept.  Gentamicin 1% ointment applied and dressed with dry/sterile gauze.)   Date of Last Dressing Change 01/29/24   Dialysis Type Continuous cycling   Exit Site Condition excellent   Catheter Care Given Yes   Cycler   Verification of Prescription CCPD   Informed Consent  Yes   Total Volume Programmed 17077 mL   Therapy Time (Hours:Minutes) 9:30   Cycler Type Jung HomeChoice   Last Fill Volume 1000 mL   Dextrose Setting Same (Nonextraneal)   I Drain Alarm 1400 mL   Number of Cycles 5   Bag Volume 6000 mL   Number of Bags Used 3   Dianeal Solution Other (Comment)  (2.5% Dextrose in 6000 ml)        01/29/24 1922   Vitals   BP (!) 103/53   Temp 98.1 °F (36.7 °C)   Pulse 84   Respirations 14   SpO2 92 %     Primary RN SBAR: Sweta Pavon RN    Comments: Orders clarified with MARSHA Burns NP and Pharmacy prior to treatment start.  Patient had Vancomycin ordered in Bag 1 to administer via CCPD.  This was changed to 2.5% Dianeal with no additive.  New cassette primed and tested.  After catheter disinfection, sterile connection made and therapy initiated.  Remained with the patient during the initial drain and beginning of the first fill.  Patient left with the bed low, side rails up X 3, call bell and belongings in reach.      Hospital associated wait time; reason:   Hepatitis B Surface Ag   Date/Time Value Ref Range Status   01/28/2024 07:09 AM <0.10 Index Final      Hep B S Ab   Date/Time Value Ref Range Status   12/15/2023 05:40 AM <3.10 mIU/mL Final

## 2024-01-30 NOTE — FLOWSHEET NOTE
01/30/24 0830   Treatment   Time Off 0830   Observations & Evaluations   Level of Consciousness 0   Oriented X 4   Heart Rhythm Regular   Respiratory Quality/Effort Unlabored   O2 Device None (Room air)   Skin Color   (appropriate for ethnicity)   Skin Condition/Temp Warm;Dry   Abdomen Inspection Soft;Rounded   Bowel Sounds (All Quadrants) Active   Edema None   Pain Assessment   Pain Assessment None - Denies Pain   Technical Checks   ICEBOAT I;C;E;B;O;A;T        01/30/24 0814   Observations & Evaluations   O2 Device None (Room air)   Vital Signs   /61   Temp 98.8 °F (37.1 °C)   Pulse 89   Respirations 18   SpO2 100 %        01/30/24 0830   Observations & Evaluations   Level of Consciousness 0   Oriented X 4   Heart Rhythm Regular   Respiratory Quality/Effort Unlabored   O2 Device None (Room air)   Skin Color   (appropriate for ethnicity)   Skin Condition/Temp Warm;Dry   Abdomen Inspection Soft;Rounded   Bowel Sounds (All Quadrants) Active   Edema None   Peritoneal Dialysis Catheter Mid lower abdomen   No placement date or time found.   Catheter Location: Mid lower abdomen   Status Clamped   Dressing Status Clean, dry & intact   Dressing Gauze   Date of Last Dressing Change 01/29/24   Dialysis Type Continuous cycling   Catheter Care Given Yes   Post-Treatment (Cycler)   Average Dwell Time (Hours:Minutes) 1:22   Lost Dwell Time (Hours:Minutes) 0:00   Effluent Appearance Clear;Yellow  (foam present)   PD Output (mL) 73 mL  (id 1490 + uf 583 - last manual exchange 1/29 2000ml=73ml NET UF)     Primary RN SBAR: RUPA Padilla RN  Comments: Pt orders, notes, labs and code status reviewed. Time out complete. Tx complete. MELONY Fragoso NP at bedside, reviewed uf/plan of care to include a midday manual exchange with Vanco today 1/30.   Pt disconnected as per policy, upon departure, bed in lowest position, primary RN at bedside with patient.

## 2024-01-30 NOTE — PROGRESS NOTES
Physical Therapy  01/30/24     Chart reviewed up to this date. Pt receiving bedside HD at this time.OT will defer and continue to follow up as able/medically appropriate.   Marlen Solano, DPT, PT

## 2024-01-31 ENCOUNTER — HOME CARE VISIT (OUTPATIENT)
Dept: HOME HEALTH SERVICES | Facility: HOME HEALTH | Age: 69
End: 2024-01-31
Payer: MEDICARE

## 2024-01-31 ENCOUNTER — HOME CARE VISIT (OUTPATIENT)
Facility: HOME HEALTH | Age: 69
End: 2024-01-31
Payer: MEDICARE

## 2024-01-31 LAB
ABO + RH BLD: NORMAL
ALBUMIN SERPL-MCNC: 1.6 G/DL (ref 3.5–5)
ANION GAP SERPL CALC-SCNC: 7 MMOL/L (ref 5–15)
BLD PROD TYP BPU: NORMAL
BLD PROD TYP BPU: NORMAL
BLOOD BANK DISPENSE STATUS: NORMAL
BLOOD BANK DISPENSE STATUS: NORMAL
BLOOD GROUP ANTIBODIES SERPL: NORMAL
BPU ID: NORMAL
BPU ID: NORMAL
BUN SERPL-MCNC: 12 MG/DL (ref 6–20)
BUN/CREAT SERPL: 3 (ref 12–20)
CALCIUM SERPL-MCNC: 7.8 MG/DL (ref 8.5–10.1)
CHLORIDE SERPL-SCNC: 99 MMOL/L (ref 97–108)
CO2 SERPL-SCNC: 29 MMOL/L (ref 21–32)
CREAT SERPL-MCNC: 4.59 MG/DL (ref 0.55–1.02)
CROSSMATCH RESULT: NORMAL
CROSSMATCH RESULT: NORMAL
ERYTHROCYTE [DISTWIDTH] IN BLOOD BY AUTOMATED COUNT: 16.9 % (ref 11.5–14.5)
GLUCOSE BLD STRIP.AUTO-MCNC: 112 MG/DL (ref 65–117)
GLUCOSE BLD STRIP.AUTO-MCNC: 210 MG/DL (ref 65–117)
GLUCOSE BLD STRIP.AUTO-MCNC: 215 MG/DL (ref 65–117)
GLUCOSE BLD STRIP.AUTO-MCNC: 77 MG/DL (ref 65–117)
GLUCOSE SERPL-MCNC: 204 MG/DL (ref 65–100)
HBV SURFACE AB SER QL: NONREACTIVE
HBV SURFACE AB SER-ACNC: <3.1 MIU/ML
HCT VFR BLD AUTO: 25.1 % (ref 35–47)
HGB BLD-MCNC: 8.2 G/DL (ref 11.5–16)
MCH RBC QN AUTO: 28.2 PG (ref 26–34)
MCHC RBC AUTO-ENTMCNC: 32.7 G/DL (ref 30–36.5)
MCV RBC AUTO: 86.3 FL (ref 80–99)
NRBC # BLD: 0 K/UL (ref 0–0.01)
NRBC BLD-RTO: 0 PER 100 WBC
PHOSPHATE SERPL-MCNC: 1.9 MG/DL (ref 2.6–4.7)
PLATELET # BLD AUTO: 233 K/UL (ref 150–400)
PMV BLD AUTO: 9.3 FL (ref 8.9–12.9)
POTASSIUM SERPL-SCNC: 3.8 MMOL/L (ref 3.5–5.1)
RBC # BLD AUTO: 2.91 M/UL (ref 3.8–5.2)
SERVICE CMNT-IMP: ABNORMAL
SERVICE CMNT-IMP: ABNORMAL
SERVICE CMNT-IMP: NORMAL
SERVICE CMNT-IMP: NORMAL
SODIUM SERPL-SCNC: 135 MMOL/L (ref 136–145)
SPECIMEN EXP DATE BLD: NORMAL
UNIT DIVISION: 0
UNIT DIVISION: 0
WBC # BLD AUTO: 6.5 K/UL (ref 3.6–11)

## 2024-01-31 PROCEDURE — 6360000002 HC RX W HCPCS: Performed by: NURSE PRACTITIONER

## 2024-01-31 PROCEDURE — 80069 RENAL FUNCTION PANEL: CPT

## 2024-01-31 PROCEDURE — 2580000003 HC RX 258: Performed by: NURSE PRACTITIONER

## 2024-01-31 PROCEDURE — 6370000000 HC RX 637 (ALT 250 FOR IP): Performed by: INTERNAL MEDICINE

## 2024-01-31 PROCEDURE — 82962 GLUCOSE BLOOD TEST: CPT

## 2024-01-31 PROCEDURE — 2500000003 HC RX 250 WO HCPCS: Performed by: NURSE PRACTITIONER

## 2024-01-31 PROCEDURE — 85027 COMPLETE CBC AUTOMATED: CPT

## 2024-01-31 PROCEDURE — 2060000000 HC ICU INTERMEDIATE R&B

## 2024-01-31 PROCEDURE — 6370000000 HC RX 637 (ALT 250 FOR IP): Performed by: NURSE PRACTITIONER

## 2024-01-31 PROCEDURE — 90945 DIALYSIS ONE EVALUATION: CPT

## 2024-01-31 PROCEDURE — 6370000000 HC RX 637 (ALT 250 FOR IP): Performed by: STUDENT IN AN ORGANIZED HEALTH CARE EDUCATION/TRAINING PROGRAM

## 2024-01-31 PROCEDURE — 94640 AIRWAY INHALATION TREATMENT: CPT

## 2024-01-31 PROCEDURE — 2580000003 HC RX 258: Performed by: INTERNAL MEDICINE

## 2024-01-31 PROCEDURE — 97535 SELF CARE MNGMENT TRAINING: CPT

## 2024-01-31 PROCEDURE — 86706 HEP B SURFACE ANTIBODY: CPT

## 2024-01-31 PROCEDURE — 6370000000 HC RX 637 (ALT 250 FOR IP): Performed by: HOSPITALIST

## 2024-01-31 PROCEDURE — 36415 COLL VENOUS BLD VENIPUNCTURE: CPT

## 2024-01-31 PROCEDURE — 94761 N-INVAS EAR/PLS OXIMETRY MLT: CPT

## 2024-01-31 RX ORDER — POTASSIUM CHLORIDE 750 MG/1
40 TABLET, FILM COATED, EXTENDED RELEASE ORAL 2 TIMES DAILY
Status: DISCONTINUED | OUTPATIENT
Start: 2024-01-31 | End: 2024-02-05 | Stop reason: HOSPADM

## 2024-01-31 RX ORDER — LOPERAMIDE HYDROCHLORIDE 2 MG/1
2 CAPSULE ORAL 4 TIMES DAILY PRN
Status: DISCONTINUED | OUTPATIENT
Start: 2024-01-31 | End: 2024-02-05 | Stop reason: HOSPADM

## 2024-01-31 RX ADMIN — CARVEDILOL 6.25 MG: 3.12 TABLET, FILM COATED ORAL at 08:35

## 2024-01-31 RX ADMIN — SODIUM CHLORIDE, SODIUM LACTATE, CALCIUM CHLORIDE, MAGNESIUM CHLORIDE AND DEXTROSE 6000 ML: 2.5; 538; 448; 18.3; 5.08 INJECTION, SOLUTION INTRAPERITONEAL at 16:37

## 2024-01-31 RX ADMIN — CARVEDILOL 6.25 MG: 3.12 TABLET, FILM COATED ORAL at 16:51

## 2024-01-31 RX ADMIN — INSULIN GLARGINE 6 UNITS: 100 INJECTION, SOLUTION SUBCUTANEOUS at 21:47

## 2024-01-31 RX ADMIN — DIBASIC SODIUM PHOSPHATE, MONOBASIC POTASSIUM PHOSPHATE AND MONOBASIC SODIUM PHOSPHATE 2 TABLET: 852; 155; 130 TABLET ORAL at 07:37

## 2024-01-31 RX ADMIN — SODIUM CHLORIDE, PRESERVATIVE FREE 10 ML: 5 INJECTION INTRAVENOUS at 21:48

## 2024-01-31 RX ADMIN — DULOXETINE HYDROCHLORIDE 20 MG: 20 CAPSULE, DELAYED RELEASE ORAL at 08:35

## 2024-01-31 RX ADMIN — FLUCONAZOLE 100 MG: 100 TABLET ORAL at 08:34

## 2024-01-31 RX ADMIN — OXYCODONE HYDROCHLORIDE 5 MG: 5 TABLET ORAL at 22:58

## 2024-01-31 RX ADMIN — DIBASIC SODIUM PHOSPHATE, MONOBASIC POTASSIUM PHOSPHATE AND MONOBASIC SODIUM PHOSPHATE 2 TABLET: 852; 155; 130 TABLET ORAL at 21:47

## 2024-01-31 RX ADMIN — ARFORMOTEROL TARTRATE: 15 SOLUTION RESPIRATORY (INHALATION) at 19:25

## 2024-01-31 RX ADMIN — SODIUM CHLORIDE, PRESERVATIVE FREE 10 ML: 5 INJECTION INTRAVENOUS at 08:36

## 2024-01-31 RX ADMIN — DIBASIC SODIUM PHOSPHATE, MONOBASIC POTASSIUM PHOSPHATE AND MONOBASIC SODIUM PHOSPHATE 2 TABLET: 852; 155; 130 TABLET ORAL at 12:42

## 2024-01-31 RX ADMIN — DULOXETINE HYDROCHLORIDE 20 MG: 20 CAPSULE, DELAYED RELEASE ORAL at 21:48

## 2024-01-31 RX ADMIN — ARFORMOTEROL TARTRATE: 15 SOLUTION RESPIRATORY (INHALATION) at 07:54

## 2024-01-31 RX ADMIN — ASPIRIN 81 MG CHEWABLE TABLET 81 MG: 81 TABLET CHEWABLE at 08:35

## 2024-01-31 RX ADMIN — POTASSIUM CHLORIDE 40 MEQ: 750 TABLET, FILM COATED, EXTENDED RELEASE ORAL at 21:47

## 2024-01-31 RX ADMIN — ROSUVASTATIN 10 MG: 10 TABLET, FILM COATED ORAL at 08:35

## 2024-01-31 RX ADMIN — INSULIN LISPRO 1 UNITS: 100 INJECTION, SOLUTION INTRAVENOUS; SUBCUTANEOUS at 07:37

## 2024-01-31 RX ADMIN — MONTELUKAST 10 MG: 10 TABLET, FILM COATED ORAL at 21:48

## 2024-01-31 RX ADMIN — LOPERAMIDE HYDROCHLORIDE 2 MG: 2 CAPSULE ORAL at 13:20

## 2024-01-31 RX ADMIN — GENTAMICIN SULFATE: 1 CREAM TOPICAL at 16:37

## 2024-01-31 RX ADMIN — ONDANSETRON 4 MG: 2 INJECTION INTRAMUSCULAR; INTRAVENOUS at 16:06

## 2024-01-31 RX ADMIN — DIBASIC SODIUM PHOSPHATE, MONOBASIC POTASSIUM PHOSPHATE AND MONOBASIC SODIUM PHOSPHATE 2 TABLET: 852; 155; 130 TABLET ORAL at 17:34

## 2024-01-31 RX ADMIN — FOLIC ACID 1 MG: 1 TABLET ORAL at 08:35

## 2024-01-31 ASSESSMENT — PAIN DESCRIPTION - LOCATION: LOCATION: BACK

## 2024-01-31 ASSESSMENT — PAIN DESCRIPTION - ORIENTATION: ORIENTATION: LEFT

## 2024-01-31 ASSESSMENT — PAIN SCALES - GENERAL: PAINLEVEL_OUTOF10: 8

## 2024-01-31 NOTE — FLOWSHEET NOTE
01/31/24 0830   Observations & Evaluations   Level of Consciousness 0   Oriented X 4   Respiratory Quality/Effort Unlabored   O2 Device None (Room air)   Bilateral Breath Sounds Clear;Diminished   Skin Color   (appropriate for ethnicity)   Skin Condition/Temp Warm;Dry   Abdomen Inspection Soft;Rounded   Bowel Sounds (All Quadrants) Active   Edema None   Peritoneal Dialysis Catheter Mid lower abdomen   No placement date or time found.   Catheter Location: Mid lower abdomen   Status Clamped   Dressing Status Clean, dry & intact   Dressing Gauze   Date of Last Dressing Change 01/30/24   Catheter Care Given Yes   Post-Treatment (Cycler)   Average Dwell Time (Hours:Minutes) 1:18   Lost Dwell Time (Hours:Minutes) 0:18  (alarms n/a)   Effluent Appearance Clear;Yellow   PD Output (mL) 374 mL  (id 1669 + uf 705 - last manual exchange 2000ml = 374ml NET UF)        01/31/24 0835   Vital Signs   /71   Pulse 85     Primary RN SBAR: Daphne Borrego RN  Comments: Pt orders,notes, labs and code status reviewed. Time out complete, tx complete. Pt disconnected as per policy. Pt has no complaints to overnight tx, no pain to abdominal palpation, denies sob.   Upon departure, bed in lowest position, call kalpana and personal belongings within reach.   Reviewed plan of care with MELONY Fragoso NP to include uf, manual exchange schedule.

## 2024-01-31 NOTE — PLAN OF CARE
Problem: Occupational Therapy - Adult  Goal: By Discharge: Performs self-care activities at highest level of function for planned discharge setting.  See evaluation for individualized goals.  Description: FUNCTIONAL STATUS PRIOR TO ADMISSION:  Patient was ambulatory using RW for in-home mobility and W/C for community distances, with reported in-home fall, as well as, Supervision-Min A for ADL completion  Receives Help From: Family, ADL Assistance: Needs assistance, Bath: Modified independent, Dressing: Modified independent, Grooming: Modified independent , Feeding: Independent, Toileting: Independent,  , Ambulation Assistance: Independent, Transfer Assistance: Independent, Active : No     HOME SUPPORT: Patient lived son who provides Supervision-Min A for ADL completion.    Occupational Therapy Goals:  Initiated 1/28/2024  1.  Patient will perform RW grooming with Supervision within 7 day(s).  2.  Patient will perform seated bathing with Supervision within 7 day(s).  3.  Patient will perform RW lower body dressing with Supervision within 7 day(s).  4.  Patient will perform RW toilet transfers with Supervision  within 7 day(s).  5.  Patient will perform all aspects of RW toileting with Supervision within 7 day(s).  6.  Patient will participate in upper extremity therapeutic exercise/activities with Supervision for 10 minutes within 7 day(s).    7.  Patient will utilize energy conservation techniques during functional activities with no verbal cues within 7 day(s).    Outcome: Progressing     OCCUPATIONAL THERAPY TREATMENT  Patient: Galilea Aguilar (68 y.o. female)  Date: 1/31/2024  Primary Diagnosis: Hypokalemia [E87.6]  Hypomagnesemia [E83.42]  Generalized abdominal pain [R10.84]  ESRD on dialysis (HCC) [N18.6, Z99.2]  Severe sepsis ([facility]) [A41.9, R65.20]       Precautions: Fall Risk                Chart, occupational therapy assessment, plan of care, and goals were reviewed.    ASSESSMENT  Patient continues to  benefit from skilled OT services and is progressing towards goals. Pt received to OT services semi-reclined in bed, amendable to session. VSS throughout session, on RA. Pt required overall SPV for all bed mobility this date. Pt transferred to seated EOB, reported increased dizziness/nausea limiting session and requested return to semi-reclined in bed, BP stable. Noted pt with incontinent BM and required total A for all aspects of toileting and LB dressing at bed level. Pt donned new gown with SPV at bed level. Pt repositioned for comfort, call bell within reach, NAD. OT continues to recommend  OT pending progress.      PLAN :  Patient continues to benefit from skilled intervention to address the above impairments.  Continue treatment per established plan of care to address goals.    Recommend with staff: OOB for meals 3x/day, encourage participation in ADLs    Recommend next OT session: ADLs seated in chair, bathroom functional mobility    Recommendation for discharge: (in order for the patient to meet his/her long term goals): Therapy 2 days/week in the home pending progress    Other factors to consider for discharge: high risk for falls and concern for safely navigating or managing the home environment    IF patient discharges home will need the following DME: continuing to assess with progress       SUBJECTIVE:   Patient stated “I am incontinent.”    OBJECTIVE DATA SUMMARY:   Cognitive/Behavioral Status:  Orientation  Overall Orientation Status: Within Normal Limits  Orientation Level: Oriented X4  Cognition  Overall Cognitive Status: WNL    Functional Mobility and Transfers for ADLs:  Bed Mobility:  Bed Mobility Training  Bed Mobility Training: Yes  Rolling: Supervision  Supine to Sit: Supervision  Sit to Supine: Supervision  Scooting: Supervision     Transfers:   Transfer Training  Transfer Training: No           Balance:     Balance  Sitting: Intact      ADL Intervention:    Toileting: .  - Bowel Hygiene :

## 2024-01-31 NOTE — PROGRESS NOTES
[address], [place] [address]  Phone: [phone]   [phone]    [email or web address]     Nephrology Progress Note    Patient Name : Galilea Aguilar      : 1955     MRN : [id number]  Date of Admission : 2024  Date of Servive : 24    CC:  Follow up for ESRD       Assessment and Plan   ESRD-PD :  - cont CCPD Rx  - [facility] aware of orders     PD peritonitis:  -  1 of 2 BC +,  BC NGTD  - PD Cult: Staph Epi  - IP Vanc as mid day exchange q 3 days, vanc level <15,  will need ~ 4 weeks  - Last vanc level 14.7 ()  - Change to oral Diflucan 100 mg QD     Hypokalemia  - K 3.8  - Switch to KCL tab 40 meq BID    Hypophos  - continue with Kphos 500 mg QID  - dietary consult for nutritional recommendations     Hypo Mg  - chronic issues  - 2/2 poor nutrition and acute Diarrhea  - last mag 1.3-  recheck today     Chronic malnutrition   - dietary consulted     Anemia of CKD:  - continue LIS 20K TTS  - Hgb 8.2   - transfuse for Hgb <7  - tsat 51% with ferritin 957  - b12/ folate levels normal  - FOBT (+), GI consulted per primary team  - eliquis on hold     HTN  - BP stable     CAD s/p CABG 23  DM2  COPD  HLD  Prior CVA     Interval History:  Seen and examined this AM-Tells me she does not like the effer-K would prefer pill form No abdominal discomfort FOBT (+) per notes, Tolerating PD with low UF- no edema/ SOB remains on RA.    Review of Systems: Pertinent items are noted in HPI.    Current Medications:   Current Facility-Administered Medications   Medication Dose Route Frequency    0.9 % sodium chloride infusion   IntraVENous PRN    dianeal lo-brandi (ULTRABAG) 2.5% 6,000 mL  6,000 mL IntraPERitoneal Daily before dinner    potassium bicarb-citric acid (EFFER-K) effervescent tablet 60 mEq  60 mEq Oral BID    Phospha 250 Neutral [phone] MG TABS 2 tablet  2 tablet Oral 4x Daily AC & HS    fluconazole (DIFLUCAN) tablet 100

## 2024-01-31 NOTE — CONSULTS
Comprehensive Nutrition Assessment    Type and Reason for Visit: Reassess, Consult    Nutrition Recommendations/Plan:   Modified diet to regular 4 carb choice only to promote po intake- may add back sodium restriction if/when po intake improves  Pt does not like ONS. Whole milk TID  Start daily MVI- pt c/o altered taste which is likely from thrush vs peritonitis, but could be caused by micronutrient deficiency       Malnutrition Assessment:  Malnutrition Status:  Moderate malnutrition (01/26/24 1452)    Context:  Chronic Illness     Findings of the 6 clinical characteristics of malnutrition:  Energy Intake:  Mild decrease in energy intake (Comment)  Weight Loss:  Greater than 7.5% over 3 months     Body Fat Loss:  Mild body fat loss Triceps   Muscle Mass Loss:  Unable to assess    Fluid Accumulation:  No significant fluid accumulation     Strength:  Not Performed       Nutrition Assessment:    69 yo female admitted for severe sepsis, ABD pain with n/v/d x several days.  Pmhx: CAD s/p CABG, COPD, CVA, ESRD - on PD, DM, GERD, HTN, HLD.     1/31: Consult for ONS/follow up. Seen by GI today; s/p 1 unit PRBC.  Pt c/o bad taste in her mouth which makes her appetite very poor. Her daughter brought her Enedelia's nuggets today and she was unable to eat them. Was not able to identify any flavors/foods that are palatable. ?r/t flush vs peritonitis. She's not currently on abx. She is still dealing with nausea as well- kept emesis bag close by during our conversation.  Today she had a few bites of scrambled eggs and that's it.   Discussed snack and ONS options, importance of po intake. She does not like ensure clear or gelatein. She is doing okay with milk and cereal- likes rice krispies or frosted flakes. Stated she has not been receiving them from the kitchen (despite 1 time message in place). Ordered whole milk TID. She asked for fresh fruit and applesauce. Wants boiled eggs at breakfast. She asked for salt and pepper to  [employer]

## 2024-01-31 NOTE — PROGRESS NOTES
Deferred visit: It is noted the patient was quite nauseated and dizzy while working a short time ago with OT.  Will defer for today and continue to follow.

## 2024-01-31 NOTE — CONSULTS
2023     Years since quittin.5    Smokeless tobacco: Never   Substance Use Topics    Alcohol use: No       Family History:  Family History   Problem Relation Age of Onset    Heart Disease Mother     Prostate Cancer Father        Review of Systems:    Constitutional: negative fever, negative chills, negative weight loss  Eyes:   negative visual changes  ENT:   negative sore throat, tongue or lip swelling  Respiratory:  negative cough, negative dyspnea  Cards:  negative for chest pain, palpitations, lower extremity edema  GI:   See HPI  :  negative for frequency, dysuria  Integument:  negative for rash and pruritus  Heme:  negative for easy bruising and gum/nose bleeding  Musculoskel: negative for myalgias,  back pain and muscle weakness  Neuro:             negative for headaches, dizziness, vertigo  Psych: negative for feelings of anxiety, depression      Objective:   Patient Vitals for the past 8 hrs:   BP Temp Temp src Pulse Resp SpO2 Weight   24 1003 -- -- -- 85 -- -- --   24 0835 128/71 97.8 °F (36.6 °C) Oral 85 17 99 % --   24 0819 -- -- -- 87 18 98 % --   24 0600 -- -- -- -- -- -- 77 kg (169 lb 12.8 oz)   24 0554 -- -- -- 86 -- -- --   24 0428 129/68 98.1 °F (36.7 °C) Oral 86 14 95 % --      0701 -  1900  In: -   Out: 374    190 -  07  In: 397   Out: 73       PHYSICAL EXAM:  General appearance: cooperative, no distress, appears stated age  Skin: Extremities and face reveal no rashes.   HEENT: Sclerae anicteric.   Cardiovascular: Regular rate and rhythm.   Respiratory: Comfortable breathing with no accessory muscle use.   GI: Abdomen nondistended, soft, and nontender. Normal active bowel sounds.   Rectal: Deferred   Musculoskeletal: No pitting edema of the lower legs.  No costovertebral tenderness.   Neurological: Gross memory appears intact. Patient is alert and oriented.       Data Review     Recent Labs     24  0710 24  0650   WBC  7.4 6.5   HGB 6.8* 8.2*   HCT 21.7* 25.1*    233     Recent Labs     01/30/24  0710 01/31/24  0650    135*   K 3.1* 3.8    99   CO2 27 29   BUN PLEASE DISREGARD RESULTS 12   PHOS 1.8* 1.9*     No results for input(s): \"TP\", \"ALB\", \"GLOB\", \"GGT\", \"AML\" in the last 72 hours.    Invalid input(s): \"SGOT\", \"GPT\", \"AP\", \"TBIL\", \"AMYP\", \"LPSE\", \"HLPSE\"  No results for input(s): \"INR\", \"APTT\" in the last 72 hours.    Invalid input(s): \"PTP\"     Imaging studies reviewed    Assessment / Plan :     67 y/o admitted with severe sepsis d/t peritonitis. Now with worsening acute on chronic anemia.   Hgb down to 6.7 - she received 1 unite PRBC. Hgb today 8.2 + FOBT.     No evidence of overt bleeding- Denies hematochezia or melena.     Continue to monitor Hgb. Monitor for overt sign of bleeding.     Hgb is stable and responded adequately to transfusion. Would defer endoscopic evaluation  at this time in light of severe sepsis due to peritonitis.     Recommend OP f/up with GI to discuss role of endoscopic evaluation.              Patient Active Hospital Problem List:   Principal Problem:    Severe sepsis (HCC)  Resolved Problems:    * No resolved hospital problems. *  Natalya Diaz, APRN - NP

## 2024-01-31 NOTE — FLOWSHEET NOTE
01/31/24 1640   Observations & Evaluations   Level of Consciousness 0   Oriented X 4   Respiratory Quality/Effort Unlabored   O2 Device None (Room air)   Bilateral Breath Sounds Clear;Diminished   Skin Color   (appropriate for ethnicity)   Skin Condition/Temp Warm;Dry   Appetite Fair   Abdomen Inspection Soft;Rounded   Bowel Sounds (All Quadrants) Active   Edema None   Peritoneal Dialysis Catheter Mid lower abdomen   No placement date or time found.   Catheter Location: Mid lower abdomen   Status Accessed   Site Condition Clean, dry, intact   Dressing Status New dressing applied   Dressing Gauze   Date of Last Dressing Change 01/31/24   Dialysis Type Continuous cycling   Exit Site Condition good   Catheter Care Given Yes   Cycler   Verification of Prescription CCPD   Informed Consent    (chronic consent applies)   Total Volume Programmed 18095 mL   Therapy Time (Hours:Minutes) 9:30   Cycler Type Jung HomeChoice   Fill Volume 2300 mL   Last Fill Volume 1000 mL   Dextrose Setting Same (Nonextraneal)   I Drain Alarm 700 mL   Number of Cycles 5   Bag Volume 6000 mL   Number of Bags Used 3   Dianeal Solution   (3-2.5%in 6000ml)        01/31/24 1640   Treatment   Time On 1640   Time Off 0210   Observations & Evaluations   Level of Consciousness 0   Oriented X 4   Respiratory Quality/Effort Unlabored   O2 Device None (Room air)   Bilateral Breath Sounds Clear;Diminished   Skin Color   (appropriate for ethnicity)   Skin Condition/Temp Warm;Dry   Appetite Fair   Abdomen Inspection Soft;Rounded   Bowel Sounds (All Quadrants) Active   Edema None   Pain Assessment   Pain Assessment None - Denies Pain   Technical Checks   ICEBOAT I;C;E;B;O;A;T        01/31/24 1608   Vital Signs   Temp 98.1 °F (36.7 °C)   Temp Source Oral   Pulse 91   Heart Rate Source Monitor   Respirations 14   /75   MAP (Calculated) 94   MAP (mmHg) 93   BP Location Left upper arm   Patient Position Sitting   Oxygen Therapy   SpO2 97 %   O2 Device

## 2024-01-31 NOTE — PROGRESS NOTES
[facility]                                                                                          Hospitalist Progress Note  Salomón Nair MD   Office Phone: [phone]        Date of Service:  2024  NAME:  Galilea Aguilar  :  1955  MRN:  [id number]       Admission Summary:   Hpi quoted: \"Galilea Aguilar is a 68 y.o. female who presented to the ED with generalized abdominal pain, intermittent chest pain X 1 week and fatigue.  Additionally reports vomiting and diarrhea X few days.  Denies fevers.  Denies cough or congestion.  Hypotension with PD this week and hypotensive upon arrival in the ED.     At the bedside patient is alert cooperative and interactive with assessment.  No residual deficits from recent stroke in December.  Abdominal pain is still generalized but endorses worse in the flank areas, ongoing nausea.     PMH of CABG in 2023, ESRD on peritoneal dialysis, multiple recurrent small CVA 2023, HTN, suspected a-fib on eliquis, IDDM, HLD, \"       Interval history / Subjective:     F/u for sepsis , on PD on abx HGB drifted down < 7 -- s/p BT  D/w nephrology as well , possibly GIB , ON ELIQUIS consulted GI -- pending  For peritonitis will need abx with HD fluids vanco       Assessment & Plan:     Severe Sepsis 2/2 Peritonitis  - evidenced by peritoneal infection, hypotension, elevated lactate, increased respiratory rate on admission  -Fluid resuscitation started in the ED however less than 30 mL/kg secondary to history of ESRD PD  - PD Cult: Staph Epi Blood cultures CoNS   - Plan to restart IP Vanc,  will need ~ 4 weeks nephrology will arrange   - D/c IP Cefepime  - Change to oral Diflucan 100 mg QD    Chronic anemia ? Anemia of CKD vs others  - continue LIS  - hgb drop, held eliquis, trend, transfuse for hgb less than 7, FOBT positive   -- on eliquis will hold  -- con GI   -- cont PPI  -- ?AVM slow GIB etc      ESRD-PD   - per        Recent Labs     01/29/24  0653 01/29/24  0655 01/30/24  0710 01/31/24  0650    136 138 135*   K 3.8 3.8 3.1* 3.8    102 100 99   CO2 24 24 27 29   BUN 13 12 PLEASE DISREGARD RESULTS 12   MG 1.3*  --  1.6  --    PHOS  --  1.6* 1.8* 1.9*       No results for input(s): \"ALT\", \"TP\", \"ALB\", \"GLOB\", \"GGT\", \"AML\" in the last 72 hours.    Invalid input(s): \"SGOT\", \"GPT\", \"AP\", \"TBIL\", \"TBILI\", \"AMYP\", \"LPSE\", \"HLPSE\"    No results for input(s): \"INR\", \"APTT\" in the last 72 hours.    Invalid input(s): \"PTP\"   Recent Labs     01/29/24  0653   TIBC 79*        No results found for: \"FOL\", \"RBCF\"   No results for input(s): \"PH\", \"PCO2\", \"PO2\" in the last 72 hours.  No results for input(s): \"CPK\" in the last 72 hours.    Invalid input(s): \"CPKMB\", \"CKNDX\", \"TROIQ\"  Lab Results   Component Value Date/Time    CHOL 123 12/14/2023 06:22 AM    HDL 38 12/14/2023 06:22 AM     Lab Results   Component Value Date/Time    GLUCPOC 159 01/09/2023 03:20 PM    GLUCPOC 166 01/09/2023 03:05 PM     [unfilled]    Notes reviewed from all clinical/nonclinical/nursing services involved in patient's clinical care. Care coordination discussions were held with appropriate clinical/nonclinical/ nursing providers based on care coordination needs.         Patients current active Medications were reviewed, considered, added and adjusted based on the clinical condition today.      Home Medications were reconciled to the best of my ability given all available resources at the time of admission. Route is PO if not otherwise noted.      Admission Status:[id number]:::1}      Medications Reviewed:     Current Facility-Administered Medications   Medication Dose Route Frequency    potassium chloride (KLOR-CON) extended release tablet 40 mEq  40 mEq Oral BID    0.9 % sodium chloride infusion   IntraVENous PRN    dianeal lo-brandi (ULTRABAG) 2.5% 6,000 mL  6,000 mL IntraPERitoneal Daily before dinner    Phospha 250 Neutral [phone] MG TABS 2 tablet  2

## 2024-01-31 NOTE — PROGRESS NOTES
Bedside shift change report given to GUILLAUME Borrego RN (oncoming nurse) by SIMEON Thornton RN (offgoing nurse). Report included the following information Nurse Handoff Report, Intake/Output, MAR, and Recent Results.

## 2024-01-31 NOTE — ADT AUTH CERT
Patient Demographics    Name Patient ID SSN Gender Identity Birth Date   Galilea Wiggins [id number]  Female 53 (68 yrs)     Address Phone Email Employer    [address] [address] [phone] (H)   [phone] (W)   [phone] (M) [email or web address] RETIRED      County Race Occupation Emp Status    [employer] /  -- Retired      Reg Status PCP Date Last Verified Next Review Date    Verified Chapincito Oakes MD  [phone] 24      Admission Date Discharge Date Admitting Provider     24 -- Bobby Mooney MD       Marital Status Rastafari      Single Mu-ism        Emergency Contact 1 Emergency Contact 2   Chaz Wiggins (C)   [address] [facility] [address]   [facility]   [phone] (H)   [phone] (M) Mak Jeff (C)   [facility]   [phone] (H)     Subscriber Details  Hospital Account #[id number]  [facility] Subscriber Name/Sex/Relation Subscriber  Subscriber Address/Phone Subscriber Emp/Emp Phone   1. [facility] MEDICARE   [id number] GALILEA WIGGINS - Female   (Self) 1955 [address] [place]  [address] [phone](H)   [phone](O) RETIRED      Utilization Reviews          Last Updated by Kennedi Maxwell on 2024 1633     Review Status Created By   In Primary Kennedi Maxwell       Review Type Associated Date   -- 2024      Criteria Review   24 Telemetry - Intermediate care     PERTINENT UPDATES:  per Nephrology:  states she had episode of vomiting earlier in AM. Vanc level improving.     per IM:  F/u for sepsis, on PD on abx   Pt c/o Left sided back pain      pain scale: 5-9/10 back     VITALS:  T: 98.4 F  HR: 99  BP: 103//70  RR: 18  O2: 92% RA     Total intake: 2360ml (dialysis 350ml; other - PD 2000ml; IV 10ml)     ABNL/PERTINENT LABS/RADIOLOGY/DIAGNOSTIC STUDIES:  24 06:53  Creatinine: 4.84 (H)  Bun/Cre Ratio: 3 (L)  Est, Glom Filt Rate: 9 (L)  Magnesium: 1.3  benefits from skilled OT to address functional deficits during acute hospitalization, with HHOT recommended at discharge.     Functional Outcome Measure:  The patient scored 50/100 Barthel Index on the Barthel Index outcome measure.       PLAN :  Frequency/Duration: OT Plan of Care: 4 times/week     Recommendation for discharge: (in order for the patient to meet his/her long term goals): Therapy 2 days/week in the home                 1/27 -  by Honey Carranza  Last Updated by Honey Carranza on 1/28/2024 1901     Review Status Created By   In Primary Honey Carranza       Review Type   --      Criteria Review   DATE: 1/27/24     VITALS:  97.9, 20, 88, 89/51, 100% RA, Pain 7     ABNL/PERTINENT LABS/RADIOLOGY/DIAGNOSTIC STUDIES:    01/28/24 07:09   Creatinine 4.87 (H)   Bun/Cre Ratio 2 (L)   Est, Glom Filt Rate 9 (L)   Magnesium 1.3 (L)   Glucose, Random 178 (H)   CALCIUM, SERUM, 204671 6.9 (L)   Phosphorus 1.3 (L)   Albumin 2.0 (L)   WBC 13.0 (H)   RBC 2.56 (L)   Hemoglobin Quant 7.3 (L)   Hematocrit 23.5 (L)   RDW 15.5 (H)   Neutrophils % 84 (H)   Lymphocyte % 7 (L)   Neutrophils Absolute 11.1 (H)   Immature Granulocytes 1 (H)   Absolute Immature Granulocyte 0.1 (H)      1/28/24 Peritoneal fluid:    01/28/24 17:42   RBC, Fluid 4 (H)      PHYSICAL EXAM:  General : alert x 3, awake, no acute distress,   HEENT: anicteric,EOMI, moist mucus membrane   Neck: supple, no JVD, no meningeal signs  Chest: Clear to auscultation bilaterally   CVS: S1 S2 heard   Abd: soft/ non tender, non distended, BS physiological,   Ext: no clubbing, no cyanosis, no edema   Neuro/Psych: pleasant mood and affect, nonfocal   Skin: warm     MD CONSULTS/ASSESSMENT AND PLAN:  Internal Med:  She has no new complaints today some times her belly hurts   Plan:  -CONS in peritoneal fluid   Hopefully Dc Diflucan tomorrow if ok with nephrology  Stool still pending if negative restart Eliquis and ASA as she had stroke ? Embolic last year

## 2024-02-01 LAB
ALBUMIN SERPL-MCNC: 1.5 G/DL (ref 3.5–5)
ANION GAP SERPL CALC-SCNC: 10 MMOL/L (ref 5–15)
BUN SERPL-MCNC: 14 MG/DL (ref 6–20)
BUN/CREAT SERPL: 3 (ref 12–20)
CALCIUM SERPL-MCNC: 7.1 MG/DL (ref 8.5–10.1)
CHLORIDE SERPL-SCNC: 102 MMOL/L (ref 97–108)
CO2 SERPL-SCNC: 26 MMOL/L (ref 21–32)
COMMENT:: NORMAL
CREAT SERPL-MCNC: 4.7 MG/DL (ref 0.55–1.02)
ERYTHROCYTE [DISTWIDTH] IN BLOOD BY AUTOMATED COUNT: 17 % (ref 11.5–14.5)
GLUCOSE BLD STRIP.AUTO-MCNC: 122 MG/DL (ref 65–117)
GLUCOSE BLD STRIP.AUTO-MCNC: 122 MG/DL (ref 65–117)
GLUCOSE BLD STRIP.AUTO-MCNC: 167 MG/DL (ref 65–117)
GLUCOSE BLD STRIP.AUTO-MCNC: 224 MG/DL (ref 65–117)
GLUCOSE SERPL-MCNC: 149 MG/DL (ref 65–100)
HCT VFR BLD AUTO: 29.5 % (ref 35–47)
HGB BLD-MCNC: 9.1 G/DL (ref 11.5–16)
MCH RBC QN AUTO: 27.9 PG (ref 26–34)
MCHC RBC AUTO-ENTMCNC: 30.8 G/DL (ref 30–36.5)
MCV RBC AUTO: 90.5 FL (ref 80–99)
NRBC # BLD: 0 K/UL (ref 0–0.01)
NRBC BLD-RTO: 0 PER 100 WBC
PHOSPHATE SERPL-MCNC: 3.9 MG/DL (ref 2.6–4.7)
PLATELET # BLD AUTO: 215 K/UL (ref 150–400)
PMV BLD AUTO: 9.7 FL (ref 8.9–12.9)
POTASSIUM SERPL-SCNC: 4.1 MMOL/L (ref 3.5–5.1)
RBC # BLD AUTO: 3.26 M/UL (ref 3.8–5.2)
SERVICE CMNT-IMP: ABNORMAL
SODIUM SERPL-SCNC: 138 MMOL/L (ref 136–145)
SPECIMEN HOLD: NORMAL
WBC # BLD AUTO: 5.4 K/UL (ref 3.6–11)

## 2024-02-01 PROCEDURE — 6370000000 HC RX 637 (ALT 250 FOR IP): Performed by: HOSPITALIST

## 2024-02-01 PROCEDURE — 6360000002 HC RX W HCPCS: Performed by: NURSE PRACTITIONER

## 2024-02-01 PROCEDURE — 2580000003 HC RX 258: Performed by: NURSE PRACTITIONER

## 2024-02-01 PROCEDURE — 6370000000 HC RX 637 (ALT 250 FOR IP): Performed by: NURSE PRACTITIONER

## 2024-02-01 PROCEDURE — 97530 THERAPEUTIC ACTIVITIES: CPT

## 2024-02-01 PROCEDURE — 94640 AIRWAY INHALATION TREATMENT: CPT

## 2024-02-01 PROCEDURE — 97110 THERAPEUTIC EXERCISES: CPT

## 2024-02-01 PROCEDURE — 6360000002 HC RX W HCPCS: Performed by: INTERNAL MEDICINE

## 2024-02-01 PROCEDURE — 6370000000 HC RX 637 (ALT 250 FOR IP): Performed by: INTERNAL MEDICINE

## 2024-02-01 PROCEDURE — 36415 COLL VENOUS BLD VENIPUNCTURE: CPT

## 2024-02-01 PROCEDURE — 85027 COMPLETE CBC AUTOMATED: CPT

## 2024-02-01 PROCEDURE — 2580000003 HC RX 258: Performed by: INTERNAL MEDICINE

## 2024-02-01 PROCEDURE — 6370000000 HC RX 637 (ALT 250 FOR IP): Performed by: STUDENT IN AN ORGANIZED HEALTH CARE EDUCATION/TRAINING PROGRAM

## 2024-02-01 PROCEDURE — 80069 RENAL FUNCTION PANEL: CPT

## 2024-02-01 PROCEDURE — 87506 IADNA-DNA/RNA PROBE TQ 6-11: CPT

## 2024-02-01 PROCEDURE — 82962 GLUCOSE BLOOD TEST: CPT

## 2024-02-01 PROCEDURE — 2500000003 HC RX 250 WO HCPCS: Performed by: NURSE PRACTITIONER

## 2024-02-01 PROCEDURE — 2060000000 HC ICU INTERMEDIATE R&B

## 2024-02-01 RX ADMIN — SODIUM CHLORIDE, PRESERVATIVE FREE 10 ML: 5 INJECTION INTRAVENOUS at 21:16

## 2024-02-01 RX ADMIN — ROSUVASTATIN 10 MG: 10 TABLET, FILM COATED ORAL at 09:14

## 2024-02-01 RX ADMIN — DULOXETINE HYDROCHLORIDE 20 MG: 20 CAPSULE, DELAYED RELEASE ORAL at 21:15

## 2024-02-01 RX ADMIN — SODIUM CHLORIDE, SODIUM LACTATE, CALCIUM CHLORIDE, MAGNESIUM CHLORIDE AND DEXTROSE 6000 ML: 2.5; 538; 448; 18.3; 5.08 INJECTION, SOLUTION INTRAPERITONEAL at 13:48

## 2024-02-01 RX ADMIN — ARFORMOTEROL TARTRATE: 15 SOLUTION RESPIRATORY (INHALATION) at 07:57

## 2024-02-01 RX ADMIN — SODIUM CHLORIDE, PRESERVATIVE FREE 10 ML: 5 INJECTION INTRAVENOUS at 09:14

## 2024-02-01 RX ADMIN — FLUCONAZOLE 100 MG: 100 TABLET ORAL at 09:14

## 2024-02-01 RX ADMIN — CARVEDILOL 6.25 MG: 3.12 TABLET, FILM COATED ORAL at 09:14

## 2024-02-01 RX ADMIN — ARFORMOTEROL TARTRATE: 15 SOLUTION RESPIRATORY (INHALATION) at 19:45

## 2024-02-01 RX ADMIN — DIBASIC SODIUM PHOSPHATE, MONOBASIC POTASSIUM PHOSPHATE AND MONOBASIC SODIUM PHOSPHATE 2 TABLET: 852; 155; 130 TABLET ORAL at 21:15

## 2024-02-01 RX ADMIN — DIBASIC SODIUM PHOSPHATE, MONOBASIC POTASSIUM PHOSPHATE AND MONOBASIC SODIUM PHOSPHATE 2 TABLET: 852; 155; 130 TABLET ORAL at 06:37

## 2024-02-01 RX ADMIN — DULOXETINE HYDROCHLORIDE 20 MG: 20 CAPSULE, DELAYED RELEASE ORAL at 09:14

## 2024-02-01 RX ADMIN — CARVEDILOL 6.25 MG: 3.12 TABLET, FILM COATED ORAL at 17:37

## 2024-02-01 RX ADMIN — INSULIN GLARGINE 6 UNITS: 100 INJECTION, SOLUTION SUBCUTANEOUS at 21:14

## 2024-02-01 RX ADMIN — MONTELUKAST 10 MG: 10 TABLET, FILM COATED ORAL at 21:16

## 2024-02-01 RX ADMIN — POTASSIUM CHLORIDE 40 MEQ: 750 TABLET, FILM COATED, EXTENDED RELEASE ORAL at 21:15

## 2024-02-01 RX ADMIN — OXYCODONE HYDROCHLORIDE 5 MG: 5 TABLET ORAL at 11:04

## 2024-02-01 RX ADMIN — FOLIC ACID 1 MG: 1 TABLET ORAL at 09:14

## 2024-02-01 RX ADMIN — GENTAMICIN SULFATE: 1 CREAM TOPICAL at 13:47

## 2024-02-01 RX ADMIN — ONDANSETRON 4 MG: 2 INJECTION INTRAMUSCULAR; INTRAVENOUS at 11:04

## 2024-02-01 RX ADMIN — OXYCODONE HYDROCHLORIDE 5 MG: 5 TABLET ORAL at 21:15

## 2024-02-01 RX ADMIN — DIBASIC SODIUM PHOSPHATE, MONOBASIC POTASSIUM PHOSPHATE AND MONOBASIC SODIUM PHOSPHATE 2 TABLET: 852; 155; 130 TABLET ORAL at 11:04

## 2024-02-01 RX ADMIN — ASPIRIN 81 MG CHEWABLE TABLET 81 MG: 81 TABLET CHEWABLE at 09:15

## 2024-02-01 RX ADMIN — POTASSIUM CHLORIDE 40 MEQ: 750 TABLET, FILM COATED, EXTENDED RELEASE ORAL at 09:14

## 2024-02-01 RX ADMIN — ERYTHROPOIETIN 20000 UNITS: 20000 INJECTION, SOLUTION INTRAVENOUS; SUBCUTANEOUS at 17:37

## 2024-02-01 ASSESSMENT — PAIN DESCRIPTION - DESCRIPTORS: DESCRIPTORS: ACHING

## 2024-02-01 ASSESSMENT — PAIN SCALES - GENERAL: PAINLEVEL_OUTOF10: 7

## 2024-02-01 ASSESSMENT — PAIN DESCRIPTION - ORIENTATION: ORIENTATION: LEFT

## 2024-02-01 ASSESSMENT — PAIN DESCRIPTION - LOCATION: LOCATION: BACK

## 2024-02-01 NOTE — PROGRESS NOTES
[address], [place] [address]  Phone: [phone]   [phone]    [email or web address]     Nephrology Progress Note    Patient Name : Galilea Aguilar      : 1955     MRN : [id number]  Date of Admission : 2024  Date of Servive : 24    CC:  Follow up for ESRD       Assessment and Plan   ESRD-PD :  - cont CCPD Rx  - [facility] aware of orders     PD peritonitis:  -  1 of 2 BC +,  BC NGTD  - PD Cult: Staph Epi  - IP Vanc as mid day exchange q 3 days, vanc level <15,  will need ~ 4 weeks  -next IP dose for 2/2  - Last vanc level 14.7 ()  - Change to oral Diflucan 100 mg QD     Hypokalemia  - K 3.8  - continue with KCL tab 40 meq BID    Hypophos  - continue with Kphos 500 mg QID  - dietary consult for nutritional recommendations     Hypo Mg  - chronic issues  - 2/2 poor nutrition and acute Diarrhea  - last mag 1.6     Chronic malnutrition   - dietary consulted     Anemia of CKD:  - continue LIS 20K TTS  - Hgb 9.1  - transfuse for Hgb <7  - tsat 51% with ferritin 957  - b12/ folate levels normal  - FOBT (+)  -Seen by GI and no plans for endoscopy at this time  - eliquis on hold     HTN  - BP stable    Chronic diarrhea  -Plan for stool cultures today    CAD s/p CABG 23  DM2  COPD  HLD  Prior CVA     Interval History:  Seen and examined this AM-she continues with liquid diarrhea.  No issues with PD prior Miguelangel nurse.  Reabsorption of approximately 75 cc.  Patient denies shortness of breath or cough,    Review of Systems: Pertinent items are noted in HPI.    Current Medications:   Current Facility-Administered Medications   Medication Dose Route Frequency    potassium chloride (KLOR-CON) extended release tablet 40 mEq  40 mEq Oral BID    loperamide (IMODIUM) capsule 2 mg  2 mg Oral 4x Daily PRN    0.9 % sodium chloride infusion   IntraVENous PRN    dianeal lo-brandi (ULTRABAG) 2.5% 6,000 mL  6,000 mL IntraPERitoneal Daily

## 2024-02-01 NOTE — FLOWSHEET NOTE
CCPD Disconnection Note:     02/01/24 0818   Observations & Evaluations   Level of Consciousness 0   Heart Rhythm Regular   Respiratory Quality/Effort Unlabored   O2 Device None (Room air)   Skin Condition/Temp Dry;Warm   Abdomen Inspection Rounded;Soft   Edema None   Peritoneal Dialysis Catheter Mid lower abdomen   No placement date or time found.   Catheter Location: Mid lower abdomen   Status Deaccessed   Site Condition Clean, dry, intact   Dressing Status Clean, dry & intact   Dressing Gauze   Date of Last Dressing Change 01/31/24   Dialysis Type Continuous cycling   Catheter Care Given Yes   Post-Treatment (Cycler)   Average Dwell Time (Hours:Minutes) 1:21   Lost Dwell Time (Hours:Minutes) 0:04   Effluent Appearance Clear;Yellow   Volume Gain (mL) 74 mL  (Initial Drain (677 ml) + Total UF (249 ml) - Last Fill (1000 ml) = -74 (Fluid Gain))     Primary RN SBAR: GIL Falcon RN  Comments: Prior to disconnection one minute Alcavis scrub & soak performed. Sterile mini cap applied & transfer set secured to pt abdomen with tape. Old cassette & bags discarded in red biohazard bag.

## 2024-02-01 NOTE — FLOWSHEET NOTE
CCPD Connection Note:     02/01/24 1353   Vitals   /75   Temp 98.4 °F (36.9 °C)   Temp Source Oral   Pulse 95   Respirations 18   Observations & Evaluations   Level of Consciousness 0   Heart Rhythm Regular   Respiratory Quality/Effort Unlabored   O2 Device None (Room air)   Skin Condition/Temp Dry;Warm   Abdomen Inspection Rounded;Soft   Edema None   Peritoneal Dialysis Catheter Mid lower abdomen   No placement date or time found.   Catheter Location: Mid lower abdomen   Status Accessed   Site Condition Clean, dry, intact   Dressing Status Clean, dry & intact   Dressing Gauze   Date of Last Dressing Change 02/01/24   Dialysis Type Continuous cycling   Exit Site Condition Good   Catheter Care Given Yes   Cycler   Verification of Prescription CCPD   Total Volume Programmed 73566 mL   Therapy Time (Hours:Minutes) 9:30   Cycler Type Jung HomeChoice   Fill Volume 2300 mL   Last Fill Volume 1000 mL   Dextrose Setting Same (Nonextraneal)   Number of Cycles 5   Bag Volume 6000 mL   Number of Bags Used 3   Dianeal Solution   (2.5% Dextrose)     Primary RN SBAR: EV Allen RN  Patient Education: PD cath infection prevention & control.  Hepatitis B Surface Ag   Date/Time Value Ref Range Status   01/28/2024 07:09 AM <0.10 Index Final     Hep B S Ab   Date/Time Value Ref Range Status   01/31/2024 06:50 AM <3.10 mIU/mL Final     Pt orders, notes, labs, code status and consent reviewed. Time out complete.       Left abdominal PD site cleansed with Exsept followed by Gentamycin and dry gauze dressing dated 2/1/24, no redness or drainage at exit site. Prior to connection, one minute Alcavis scrub & soak performed.    PD Start Time 1415   PD End Time: 2345     Remained present during initial drain followed by initiation of first fill. Upon departure, bed in lowest position & personal belongings within reach.

## 2024-02-01 NOTE — PROGRESS NOTES
[facility]  [address]  [place] [address] [phone]                     GI PROGRESS NOTE    Patient Name: Galilea Aguilar      : 1955      MRN: [id number]  Admit Date: 2024  Today's Date: 2024  CC: follow up     Subjective:     Having abdominal pain and nausea. No bleeding. Hgb improving 9.1 (8.2).     Objective:     Blood pressure 129/75, pulse 96, temperature 97.9 °F (36.6 °C), temperature source Oral, resp. rate 16, height 1.727 m (5' 8\"), weight 77 kg (169 lb 12.8 oz), SpO2 96 %.    Physical Exam:  General appearance: cooperative, no distress, appears stated age  Skin: Extremities and face reveal no rashes.   HEENT: Sclerae anicteric.   Cardiovascular: Regular rate and rhythm.   Respiratory: Comfortable breathing with no accessory muscle use.   GI: Abdomen nondistended, soft, and tender. Normal active bowel sounds.   Rectal: Deferred   Musculoskeletal: No pitting edema of the lower legs.  No costovertebral tenderness.   Neurological: Gross memory appears intact. Patient is alert and oriented.       Data Review:    Recent Results (from the past 24 hour(s))   POCT Glucose    Collection Time: 24 11:31 AM   Result Value Ref Range    POC Glucose 112 65 - 117 mg/dL    Performed by: DARA Tan  [facility]    POCT Glucose    Collection Time: 24  4:45 PM   Result Value Ref Range    POC Glucose 77 65 - 117 mg/dL    Performed by: DARA Tan  [facility]    POCT Glucose    Collection Time: 24  9:41 PM   Result Value Ref Range    POC Glucose 210 (H) 65 - 117 mg/dL    Performed by: GRACE Hill   [facility]    Renal Function Panel    Collection Time: 24  3:54 AM   Result Value Ref Range    Sodium 138 136 - 145 mmol/L    Potassium 4.1 3.5 - 5.1 mmol/L    Chloride 102 97 - 108 mmol/L    CO2 26 21 - 32 mmol/L    Anion Gap 10 5 - 15 mmol/L    Glucose 149 (H) 65 - 100 mg/dL    BUN 14 6 - 20 MG/DL    Creatinine 4.70 (H) 0.55 - 1.02 MG/DL    Bun/Cre Ratio 3 (L) 12 - 20

## 2024-02-01 NOTE — PLAN OF CARE
Problem: Physical Therapy - Adult  Goal: By Discharge: Performs mobility at highest level of function for planned discharge setting.  See evaluation for individualized goals.  Description: FUNCTIONAL STATUS PRIOR TO ADMISSION: Patient was modified independent using a rolling walker within her home and a wheelchair in the community for functional mobility.  Pt endorses having several falls in the past year.  The last fall occurred in early January 2024.     HOME SUPPORT PRIOR TO ADMISSION: The patient lived with her son and required occasional assistance for bathing and dressing.  Pt reports her family would standby when ambulating due to her fall risk.    Physical Therapy Goals  Initiated 1/27/2024  1.  Patient will move from supine to sit and sit to supine in bed with independence within 7 day(s).    2.  Patient will perform sit to stand with supervision within 7 day(s).  3.  Patient will transfer from bed to chair and chair to bed with supervision using the least restrictive device within 7 day(s).  4.  Patient will ambulate with supervision for 100 feet with the least restrictive device within 7 day(s).   5.  Patient will ascend/descend 4 stairs with 1 handrail(s) with contact guard assist within 7 day(s).   Outcome: Progressing   PHYSICAL THERAPY TREATMENT    Patient: Galilea Aguilar (68 y.o. female)  Date: 2/1/2024  Diagnosis: Hypokalemia [E87.6]  Hypomagnesemia [E83.42]  Generalized abdominal pain [R10.84]  ESRD on dialysis (HCC) [N18.6, Z99.2]  Severe sepsis (HCC) [A41.9, R65.20] Severe sepsis (HCC)      Precautions: Fall Risk                      ASSESSMENT:  Patient continues to benefit from skilled PT services and is slowly progressing towards goals. Pt pleasant and agreeable to attempt bed mobility and transfer to the chair following bed therex and VS assessment for orthostatics. Pt reports +dizziness with supine to sit, however, BP stable and dizziness does not get worse with time seated (stays the

## 2024-02-01 NOTE — PROGRESS NOTES
Hospitalist Progress Note  Emmanuel Anders MD  Answering service: [phone] OR [phone] from in house phone        Date of Service:  2024  NAME:  Galilea Aguilar  :  1955  MRN:  [id number]      Admission Summary:   Hpi quoted: \"Galilea Aguilar is a 68 y.o. female who presented to the ED with generalized abdominal pain, intermittent chest pain X 1 week and fatigue.  Additionally reports vomiting and diarrhea X few days.  Denies fevers.  Denies cough or congestion.  Hypotension with PD this week and hypotensive upon arrival in the ED.     At the bedside patient is alert cooperative and interactive with assessment.  No residual deficits from recent stroke in December.  Abdominal pain is still generalized but endorses worse in the flank areas, ongoing nausea.     PMH of CABG in 2023, ESRD on peritoneal dialysis, multiple recurrent small CVA 2023, HTN, suspected a-fib on eliquis, IDDM, HLD, \"    Interval history / Subjective:   Seen and examined for follow up peritonitis. Reports some abdominal pain. No n/v. No bloody BM.      Assessment & Plan:     Severe Sepsis 2/2 Peritonitis  - evidenced by peritoneal infection, hypotension, elevated lactate, increased respiratory rate on admission  -Fluid resuscitation started in the ED however less than 30 mL/kg secondary to history of ESRD PD  - PD Cult: Staph Epi Blood cultures CoNS   - Plan to restart IP Vanc,  will need ~ 4 weeks nephrology will arrange   - Change to oral Diflucan 100 mg QD  -continue current management      Chronic anemia ? Anemia of CKD vs others  - continue LIS  - hgb drop, held eliquis, trend, transfuse for hgb less than 7, FOBT positive   -- cont PPI  -- GI has seen  -- no further w/u  -- monitor Hb   --eliquis held and may restart tomorrow if hb continues stable       ESRD-PD   - per nephrology  - resume PD    - daily labs      PD  mg Oral Nightly PRN    arformoterol 15 mcg-budesonide 0.25 mg neb solution   Nebulization BID RT    montelukast (SINGULAIR) tablet 10 mg  10 mg Oral Nightly    sodium chloride flush 0.9 % injection 5-40 mL  5-40 mL IntraVENous 2 times per day    sodium chloride flush 0.9 % injection 5-40 mL  5-40 mL IntraVENous PRN    0.9 % sodium chloride infusion   IntraVENous PRN    acetaminophen (TYLENOL) tablet 650 mg  650 mg Oral Q6H PRN    Or    acetaminophen (TYLENOL) suppository 650 mg  650 mg Rectal Q6H PRN    ondansetron (ZOFRAN) injection 4 mg  4 mg IntraVENous Q6H PRN    vancomycin (VANCOCIN) intermittent dosing (placeholder)   Other RX Placeholder    gentamicin (GARAMYCIN) 0.1 % cream   Topical PRN     ______________________________________________________________________  EXPECTED LENGTH OF STAY: Unable to retrieve estimated LOS  ACTUAL LENGTH OF STAY:          6                 Emmanuel Anders MD

## 2024-02-02 LAB
ALBUMIN SERPL-MCNC: 1.5 G/DL (ref 3.5–5)
ANION GAP SERPL CALC-SCNC: 8 MMOL/L (ref 5–15)
BUN SERPL-MCNC: 13 MG/DL (ref 6–20)
BUN/CREAT SERPL: 3 (ref 12–20)
C COLI+JEJUNI TUF STL QL NAA+PROBE: NEGATIVE
CALCIUM SERPL-MCNC: 7.1 MG/DL (ref 8.5–10.1)
CHLORIDE SERPL-SCNC: 102 MMOL/L (ref 97–108)
CO2 SERPL-SCNC: 27 MMOL/L (ref 21–32)
CREAT SERPL-MCNC: 4.6 MG/DL (ref 0.55–1.02)
EC STX1+STX2 GENES STL QL NAA+PROBE: NEGATIVE
ERYTHROCYTE [DISTWIDTH] IN BLOOD BY AUTOMATED COUNT: 16.7 % (ref 11.5–14.5)
ETEC ELTA+ESTB GENES STL QL NAA+PROBE: NEGATIVE
GLUCOSE BLD STRIP.AUTO-MCNC: 114 MG/DL (ref 65–117)
GLUCOSE BLD STRIP.AUTO-MCNC: 180 MG/DL (ref 65–117)
GLUCOSE BLD STRIP.AUTO-MCNC: 202 MG/DL (ref 65–117)
GLUCOSE BLD STRIP.AUTO-MCNC: 95 MG/DL (ref 65–117)
GLUCOSE SERPL-MCNC: 172 MG/DL (ref 65–100)
HCT VFR BLD AUTO: 26.9 % (ref 35–47)
HEMOCCULT STL QL: NEGATIVE
HGB BLD-MCNC: 8.4 G/DL (ref 11.5–16)
MCH RBC QN AUTO: 27.5 PG (ref 26–34)
MCHC RBC AUTO-ENTMCNC: 31.2 G/DL (ref 30–36.5)
MCV RBC AUTO: 88.2 FL (ref 80–99)
NRBC # BLD: 0 K/UL (ref 0–0.01)
NRBC BLD-RTO: 0 PER 100 WBC
P SHIGELLOIDES DNA STL QL NAA+PROBE: NEGATIVE
PHOSPHATE SERPL-MCNC: 4.6 MG/DL (ref 2.6–4.7)
PLATELET # BLD AUTO: 197 K/UL (ref 150–400)
PMV BLD AUTO: 9.7 FL (ref 8.9–12.9)
POTASSIUM SERPL-SCNC: 4.4 MMOL/L (ref 3.5–5.1)
RBC # BLD AUTO: 3.05 M/UL (ref 3.8–5.2)
SALMONELLA SP SPAO STL QL NAA+PROBE: NEGATIVE
SERVICE CMNT-IMP: ABNORMAL
SERVICE CMNT-IMP: ABNORMAL
SERVICE CMNT-IMP: NORMAL
SERVICE CMNT-IMP: NORMAL
SHIGELLA SP+EIEC IPAH STL QL NAA+PROBE: NEGATIVE
SODIUM SERPL-SCNC: 137 MMOL/L (ref 136–145)
V CHOL+PARA+VUL DNA STL QL NAA+NON-PROBE: NEGATIVE
VANCOMYCIN SERPL-MCNC: 17.5 UG/ML
WBC # BLD AUTO: 5.1 K/UL (ref 3.6–11)
Y ENTEROCOL DNA STL QL NAA+NON-PROBE: NEGATIVE

## 2024-02-02 PROCEDURE — 6370000000 HC RX 637 (ALT 250 FOR IP): Performed by: STUDENT IN AN ORGANIZED HEALTH CARE EDUCATION/TRAINING PROGRAM

## 2024-02-02 PROCEDURE — 97535 SELF CARE MNGMENT TRAINING: CPT

## 2024-02-02 PROCEDURE — 6370000000 HC RX 637 (ALT 250 FOR IP): Performed by: HOSPITALIST

## 2024-02-02 PROCEDURE — 6370000000 HC RX 637 (ALT 250 FOR IP): Performed by: NURSE PRACTITIONER

## 2024-02-02 PROCEDURE — 6360000002 HC RX W HCPCS: Performed by: NURSE PRACTITIONER

## 2024-02-02 PROCEDURE — 2580000003 HC RX 258: Performed by: NURSE PRACTITIONER

## 2024-02-02 PROCEDURE — 80069 RENAL FUNCTION PANEL: CPT

## 2024-02-02 PROCEDURE — 82272 OCCULT BLD FECES 1-3 TESTS: CPT

## 2024-02-02 PROCEDURE — 97530 THERAPEUTIC ACTIVITIES: CPT

## 2024-02-02 PROCEDURE — 36415 COLL VENOUS BLD VENIPUNCTURE: CPT

## 2024-02-02 PROCEDURE — 2580000003 HC RX 258: Performed by: INTERNAL MEDICINE

## 2024-02-02 PROCEDURE — 97116 GAIT TRAINING THERAPY: CPT

## 2024-02-02 PROCEDURE — 90945 DIALYSIS ONE EVALUATION: CPT

## 2024-02-02 PROCEDURE — 6370000000 HC RX 637 (ALT 250 FOR IP): Performed by: INTERNAL MEDICINE

## 2024-02-02 PROCEDURE — 82962 GLUCOSE BLOOD TEST: CPT

## 2024-02-02 PROCEDURE — 80202 ASSAY OF VANCOMYCIN: CPT

## 2024-02-02 PROCEDURE — 2060000000 HC ICU INTERMEDIATE R&B

## 2024-02-02 PROCEDURE — 2500000003 HC RX 250 WO HCPCS: Performed by: NURSE PRACTITIONER

## 2024-02-02 PROCEDURE — 94640 AIRWAY INHALATION TREATMENT: CPT

## 2024-02-02 PROCEDURE — 85027 COMPLETE CBC AUTOMATED: CPT

## 2024-02-02 RX ADMIN — Medication 6 MG: at 22:11

## 2024-02-02 RX ADMIN — FLUCONAZOLE 100 MG: 100 TABLET ORAL at 09:04

## 2024-02-02 RX ADMIN — CARVEDILOL 6.25 MG: 3.12 TABLET, FILM COATED ORAL at 09:04

## 2024-02-02 RX ADMIN — FOLIC ACID 1 MG: 1 TABLET ORAL at 09:04

## 2024-02-02 RX ADMIN — MONTELUKAST 10 MG: 10 TABLET, FILM COATED ORAL at 21:48

## 2024-02-02 RX ADMIN — GENTAMICIN SULFATE: 1 CREAM TOPICAL at 14:40

## 2024-02-02 RX ADMIN — DIBASIC SODIUM PHOSPHATE, MONOBASIC POTASSIUM PHOSPHATE AND MONOBASIC SODIUM PHOSPHATE 2 TABLET: 852; 155; 130 TABLET ORAL at 12:47

## 2024-02-02 RX ADMIN — DIBASIC SODIUM PHOSPHATE, MONOBASIC POTASSIUM PHOSPHATE AND MONOBASIC SODIUM PHOSPHATE 2 TABLET: 852; 155; 130 TABLET ORAL at 07:15

## 2024-02-02 RX ADMIN — CARVEDILOL 6.25 MG: 3.12 TABLET, FILM COATED ORAL at 17:18

## 2024-02-02 RX ADMIN — OXYCODONE HYDROCHLORIDE 5 MG: 5 TABLET ORAL at 22:12

## 2024-02-02 RX ADMIN — DIBASIC SODIUM PHOSPHATE, MONOBASIC POTASSIUM PHOSPHATE AND MONOBASIC SODIUM PHOSPHATE 2 TABLET: 852; 155; 130 TABLET ORAL at 17:18

## 2024-02-02 RX ADMIN — SODIUM CHLORIDE, SODIUM LACTATE, CALCIUM CHLORIDE, MAGNESIUM CHLORIDE AND DEXTROSE 6000 ML: 2.5; 538; 448; 18.3; 5.08 INJECTION, SOLUTION INTRAPERITONEAL at 14:40

## 2024-02-02 RX ADMIN — SODIUM CHLORIDE, PRESERVATIVE FREE 10 ML: 5 INJECTION INTRAVENOUS at 21:49

## 2024-02-02 RX ADMIN — ARFORMOTEROL TARTRATE: 15 SOLUTION RESPIRATORY (INHALATION) at 07:09

## 2024-02-02 RX ADMIN — LOPERAMIDE HYDROCHLORIDE 2 MG: 2 CAPSULE ORAL at 18:04

## 2024-02-02 RX ADMIN — INSULIN GLARGINE 6 UNITS: 100 INJECTION, SOLUTION SUBCUTANEOUS at 21:48

## 2024-02-02 RX ADMIN — POTASSIUM CHLORIDE 40 MEQ: 750 TABLET, FILM COATED, EXTENDED RELEASE ORAL at 21:48

## 2024-02-02 RX ADMIN — POTASSIUM CHLORIDE 40 MEQ: 750 TABLET, FILM COATED, EXTENDED RELEASE ORAL at 09:04

## 2024-02-02 RX ADMIN — DULOXETINE HYDROCHLORIDE 20 MG: 20 CAPSULE, DELAYED RELEASE ORAL at 09:04

## 2024-02-02 RX ADMIN — ARFORMOTEROL TARTRATE: 15 SOLUTION RESPIRATORY (INHALATION) at 19:34

## 2024-02-02 RX ADMIN — DULOXETINE HYDROCHLORIDE 20 MG: 20 CAPSULE, DELAYED RELEASE ORAL at 21:48

## 2024-02-02 RX ADMIN — LOPERAMIDE HYDROCHLORIDE 2 MG: 2 CAPSULE ORAL at 22:12

## 2024-02-02 RX ADMIN — ASPIRIN 81 MG CHEWABLE TABLET 81 MG: 81 TABLET CHEWABLE at 09:04

## 2024-02-02 RX ADMIN — OXYCODONE HYDROCHLORIDE 5 MG: 5 TABLET ORAL at 18:04

## 2024-02-02 RX ADMIN — SODIUM CHLORIDE, SODIUM LACTATE, CALCIUM CHLORIDE, MAGNESIUM CHLORIDE AND DEXTROSE 6000 ML: 2.5; 538; 448; 18.3; 5.08 INJECTION, SOLUTION INTRAPERITONEAL at 14:41

## 2024-02-02 RX ADMIN — OXYCODONE HYDROCHLORIDE 5 MG: 5 TABLET ORAL at 09:08

## 2024-02-02 RX ADMIN — ROSUVASTATIN 10 MG: 10 TABLET, FILM COATED ORAL at 09:04

## 2024-02-02 RX ADMIN — SODIUM CHLORIDE, PRESERVATIVE FREE 10 ML: 5 INJECTION INTRAVENOUS at 09:04

## 2024-02-02 RX ADMIN — DIBASIC SODIUM PHOSPHATE, MONOBASIC POTASSIUM PHOSPHATE AND MONOBASIC SODIUM PHOSPHATE 2 TABLET: 852; 155; 130 TABLET ORAL at 21:48

## 2024-02-02 ASSESSMENT — PAIN DESCRIPTION - ONSET: ONSET: ON-GOING

## 2024-02-02 ASSESSMENT — PAIN - FUNCTIONAL ASSESSMENT: PAIN_FUNCTIONAL_ASSESSMENT: PREVENTS OR INTERFERES SOME ACTIVE ACTIVITIES AND ADLS

## 2024-02-02 ASSESSMENT — PAIN DESCRIPTION - LOCATION
LOCATION: BACK
LOCATION: BACK
LOCATION: ABDOMEN

## 2024-02-02 ASSESSMENT — PAIN DESCRIPTION - FREQUENCY: FREQUENCY: INTERMITTENT

## 2024-02-02 ASSESSMENT — PAIN DESCRIPTION - DESCRIPTORS
DESCRIPTORS: ACHING
DESCRIPTORS: DISCOMFORT;SORE;ACHING

## 2024-02-02 ASSESSMENT — PAIN DESCRIPTION - DIRECTION: RADIATING_TOWARDS: LEFT FLANK PAIN

## 2024-02-02 ASSESSMENT — PAIN SCALES - GENERAL
PAINLEVEL_OUTOF10: 8
PAINLEVEL_OUTOF10: 0
PAINLEVEL_OUTOF10: 6
PAINLEVEL_OUTOF10: 6

## 2024-02-02 ASSESSMENT — PAIN DESCRIPTION - ORIENTATION: ORIENTATION: LOWER

## 2024-02-02 ASSESSMENT — PAIN DESCRIPTION - PAIN TYPE: TYPE: ACUTE PAIN

## 2024-02-02 NOTE — PROGRESS NOTES
1600: Bedside shift change report given to JANES Luque (oncoming nurse) by JANES Raygoza (offgoing nurse). Report included the following information Nurse Handoff Report, MAR, and Recent Results.     1930: Bedside shift change report given to JANES Kolb (oncoming nurse) by JANES Luque (offgoing nurse). Report included the following information Nurse Handoff Report, MAR, and Recent Results.

## 2024-02-02 NOTE — FLOWSHEET NOTE
Peritoneal Dialysis Disconnection / [phone]    Primary RN SBAR: Williams, RN  Patient Education: access/site care    Hepatitis B Surface Ag   Date/Time Value Ref Range Status   01/28/2024 07:09 AM <0.10 Index Final     Hep B S Ab   Date/Time Value Ref Range Status   01/31/2024 06:50 AM <3.10 mIU/mL Final       02/02/24 0114   Peritoneal Dialysis Catheter Mid lower abdomen   No placement date or time found.   Catheter Location: Mid lower abdomen   Status Deaccessed   Site Condition Clean, dry, intact   Dressing Status Clean, dry & intact   Dressing Gauze   Date of Last Dressing Change 02/01/24   Dialysis Type Continuous cycling   Exit Site Condition Good   Catheter Care Given Yes   Post-Treatment (Cycler)   Average Dwell Time (Hours:Minutes) 1:22   Lost Dwell Time (Hours:Minutes) 0:01   Effluent Appearance Clear;Yellow   Volume Gain (mL) 45 mL  ( ml +  ml - LF 1000 ml = -45 ml)     Miguelangel RN at bedside to disconnect pt from CCPD tx. Orders, consent, pt, and code status confirmed. Tx completed. Used cassette and bags discarded. Education and pre/post report given to primary RN.    Net fluid gain: 45 ml

## 2024-02-02 NOTE — PROGRESS NOTES
Hospitalist Progress Note  Emmanuel Anders MD  Answering service: [phone] OR [phone] from in house phone        Date of Service:  2024  NAME:  Galilea Aguilar  :  1955  MRN:  [id number]      Admission Summary:   Hpi quoted: \"Galilea Aguilar is a 68 y.o. female who presented to the ED with generalized abdominal pain, intermittent chest pain X 1 week and fatigue.  Additionally reports vomiting and diarrhea X few days.  Denies fevers.  Denies cough or congestion.  Hypotension with PD this week and hypotensive upon arrival in the ED.     At the bedside patient is alert cooperative and interactive with assessment.  No residual deficits from recent stroke in December.  Abdominal pain is still generalized but endorses worse in the flank areas, ongoing nausea.     PMH of CABG in 2023, ESRD on peritoneal dialysis, multiple recurrent small CVA 2023, HTN, suspected a-fib on eliquis, IDDM, HLD, \"    Interval history / Subjective:   Seen and examined for follow up peritonitis. Reports feeling better today. No acute complaints.      Assessment & Plan:     Severe Sepsis 2/2 Peritonitis  - evidenced by peritoneal infection, hypotension, elevated lactate, increased respiratory rate on admission  -Fluid resuscitation started in the ED however less than 30 mL/kg secondary to history of ESRD PD  - PD Cult: Staph Epi Blood cultures CoNS   - Plan to restart IP Vanc,  will need ~ 4 weeks nephrology will arrange   - Change to oral Diflucan 100 mg QD  -continue current management      Chronic anemia ? Anemia of CKD vs others  - continue LIS  - hgb drop, held eliquis, trend, transfuse for hgb less than 7  -- cont PPI  -- GI has seen, no plans for intervention   -- no further w/u  -- monitor Hb   --eliquis held for now      ESRD-PD   - per nephrology  - resume PD    - daily labs      PD peritonitis   - peritoneal fluid sent  for cell count and culture-- heavy staph epi on vanc d/c cefepime  - plan as above   - on vanc and diflucan      Severe Hypokalemia   Hypo Magnesemia  - chronic issues per renal  - 2/2 poor nutrition and acute Diarrhea  - replete aggresively     Chronic malnutrition   - dietary consult      HTN  - Coreg with hold parameters     Previous recurrent multifocal small strokes 12/2023   - c/w asa, statin  - pt/ot eval's     Suspected A-fib   - follows w/ Dr. Leach, started on eliquis empirically on last d/c (December) will hold for now , possibly GIB     CAD s/p CABG 9/7/23 - c/w asa, statin     DM2 - lantus HS, ssi     COPD - c/w formulary substitution for Dulera w/ aformoterol/budenoside     HLD - c/w statin     Back pain-- local patch and PT/OT  May get imaging but doubt that will be of any help       Code status: full   Prophylaxis: SCDs, eliquis on hold   Care Plan discussed with: patient, nurse  Anticipated Disposition: home when cleared by neprho. Likely tomorrow      Principal Problem:    Severe sepsis (HCC)  Resolved Problems:    * No resolved hospital problems. *            Review of Systems:   Pertinent items are noted in HPI.         Vital Signs:    Last 24hrs VS reviewed since prior progress note. Most recent are:  /83   Pulse 96   Temp 98.2 °F (36.8 °C) (Oral)   Resp 17   Ht 1.727 m (5' 8\")   Wt 77 kg (169 lb 12.8 oz)   SpO2 99%   BMI 25.82 kg/m²        Intake/Output Summary (Last 24 hours) at 2/2/2024 1019  Last data filed at 2/2/2024 0114  Gross per 24 hour   Intake 285 ml   Output --   Net 285 ml          Physical Examination:     I had a face to face encounter with this patient and independently examined them on 2/2/2024 as outlined below:          General : alert x 3, awake, no acute distress,   HEENT: PEERL, EOMI, moist mucus membrane  Neck: supple, no JVD, no meningeal signs  Chest: Clear to auscultation bilaterally   CVS: S1 S2 heard, Capillary refill less than 2 seconds  Abd: tenderness to  BID RT    montelukast (SINGULAIR) tablet 10 mg  10 mg Oral Nightly    sodium chloride flush 0.9 % injection 5-40 mL  5-40 mL IntraVENous 2 times per day    sodium chloride flush 0.9 % injection 5-40 mL  5-40 mL IntraVENous PRN    0.9 % sodium chloride infusion   IntraVENous PRN    acetaminophen (TYLENOL) tablet 650 mg  650 mg Oral Q6H PRN    Or    acetaminophen (TYLENOL) suppository 650 mg  650 mg Rectal Q6H PRN    ondansetron (ZOFRAN) injection 4 mg  4 mg IntraVENous Q6H PRN    vancomycin (VANCOCIN) intermittent dosing (placeholder)   Other RX Placeholder    gentamicin (GARAMYCIN) 0.1 % cream   Topical PRN     ______________________________________________________________________  EXPECTED LENGTH OF STAY: Unable to retrieve estimated LOS  ACTUAL LENGTH OF STAY:          7                 Emmanuel Anders MD

## 2024-02-02 NOTE — PLAN OF CARE
Problem: Occupational Therapy - Adult  Goal: By Discharge: Performs self-care activities at highest level of function for planned discharge setting.  See evaluation for individualized goals.  Description: FUNCTIONAL STATUS PRIOR TO ADMISSION:  Patient was ambulatory using RW for in-home mobility and W/C for community distances, with reported in-home fall, as well as, Supervision-Min A for ADL completion  Receives Help From: Family, ADL Assistance: Needs assistance, Bath: Modified independent, Dressing: Modified independent, Grooming: Modified independent , Feeding: Independent, Toileting: Independent,  , Ambulation Assistance: Independent, Transfer Assistance: Independent, Active : No     HOME SUPPORT: Patient lived son who provides Supervision-Min A for ADL completion.    Occupational Therapy Goals:  Initiated 1/28/2024  1.  Patient will perform RW grooming with Supervision within 7 day(s).  2.  Patient will perform seated bathing with Supervision within 7 day(s).  3.  Patient will perform RW lower body dressing with Supervision within 7 day(s).  4.  Patient will perform RW toilet transfers with Supervision  within 7 day(s).  5.  Patient will perform all aspects of RW toileting with Supervision within 7 day(s).  6.  Patient will participate in upper extremity therapeutic exercise/activities with Supervision for 10 minutes within 7 day(s).    7.  Patient will utilize energy conservation techniques during functional activities with no verbal cues within 7 day(s).    Outcome: Progressing     OCCUPATIONAL THERAPY TREATMENT  Patient: Galilea Aguilar (68 y.o. female)  Date: 2/2/2024  Primary Diagnosis: Hypokalemia [E87.6]  Hypomagnesemia [E83.42]  Generalized abdominal pain [R10.84]  ESRD on dialysis (HCC) [N18.6, Z99.2]  Severe sepsis (formerly [facility]) [A41.9, R65.20]       Precautions: Fall Risk                Chart, occupational therapy assessment, plan of care, and goals were reviewed.    ASSESSMENT  Patient continues to  assistance       LE Bathing: Maximum assistance       UE Dressing: Stand by assistance  UE Dressing Skilled Clinical Factors: to don gown    LE Dressing: Contact guard assistance  LE Dressing Skilled Clinical Factors: to don socks     Pain Rating:  Reports itchiness (requested rubbing alcohol, provided lotion and Vaseline)     Pain Intervention(s):   pain is at a level acceptable to the patient  Activity Tolerance:   Fair   Please refer to the flowsheet for vital signs taken during this treatment.    After treatment:   Patient left in no apparent distress in bed, Call bell within reach, and Side rails x3    COMMUNICATION/EDUCATION:   The patient's plan of care was discussed with: physical therapist and registered nurse    Thank you for this referral.  Lyudmila Berrios OT  Minutes: 24

## 2024-02-02 NOTE — FLOWSHEET NOTE
02/02/24 1430   Observations & Evaluations   Level of Consciousness 0   Oriented X 4   Heart Rhythm Regular   Respiratory Quality/Effort Unlabored   O2 Device None (Room air)   Edema None   Peritoneal Dialysis Catheter Mid lower abdomen   No placement date or time found.   Catheter Location: Mid lower abdomen   Status Accessed   Site Condition Clean, dry, intact   Dressing Status New dressing applied   Dressing Gauze   Date of Last Dressing Change 02/02/24   Dialysis Type Continuous cycling   Exit Site Condition Good   Catheter Care Given Yes   Cycler   Verification of Prescription CCPD   Informed Consent  Yes   Total Volume Programmed 10378 mL   Therapy Time (Hours:Minutes) 9:30   Cycler Type [facility]   Fill Volume 2300 mL   Last Fill Volume 1000 mL   Dextrose Setting Same (Nonextraneal)   I Drain Alarm 700 mL   Number of Cycles 5   Bag Volume 6000 mL   Number of Bags Used 3   Dianeal Solution   (Dextrose 2.5% in 6000 ml)     Time Out (time): 1425  Primary RN SBAR: Ted Chamorro, RN   Patient Education: access care, procedure    CCPD tx initiated.    Hepatitis B Surface Ag   Date/Time Value Ref Range Status   01/28/2024 07:09 AM <0.10 Index Final     Hep B S Ab   Date/Time Value Ref Range Status   01/31/2024 06:50 AM <3.10 mIU/mL Final

## 2024-02-02 NOTE — PROGRESS NOTES
[address], [place] [address]  Phone: [phone]   [phone]    [email or web address]     Nephrology Progress Note    Patient Name : Galilea Aguilar      : 1955     MRN : [id number]  Date of Admission : 2024  Date of Servive : 24    CC:  Follow up for ESRD       Assessment and Plan   ESRD-PD :  - cont current CCPD Rx     PD peritonitis:  -  -1 of 2 BC +,  BC NGTD  - PD Cult: Staph Epi  - IP Vanc as mid day exchange q 3 days, vanc goal 10-15,  will need ~ 4 weeks  - No need for IP dose today   - continue oral Diflucan 100 mg QD     Hypokalemia  - K 3.8  - continue with KCL tab 40 meq BID    Hypophos  - continue with Kphos 500 mg QID  - dietary consult for nutritional recommendations     Hypo Mg  - chronic issues  - 2/2 poor nutrition and acute Diarrhea  - last mag 1.6     Chronic malnutrition   - dietary consulted     Anemia of CKD:  - continue LIS 20K TTS  - Hgb 9.1  - transfuse for Hgb <7  - tsat 51% with ferritin 957  - b12/ folate levels normal  - FOBT (+)  -Seen by GI and no plans for endoscopy at this time  - eliquis on hold     HTN  - BP stable    Chronic diarrhea  -f/u stool cx     CAD s/p CABG 23  DM2  COPD  HLD  Prior CVA     Interval History:  Seen and examined. Ongoing diarrhea. Hb continues to trend down. Stool occult, Cx not done. PD without issues. Vanc level > 15    Review of Systems: Pertinent items are noted in HPI.    Current Medications:   Current Facility-Administered Medications   Medication Dose Route Frequency    [START ON 2/3/2024] vancomycin random level 2/3 with AM labs   Other Once    potassium chloride (KLOR-CON) extended release tablet 40 mEq  40 mEq Oral BID    loperamide (IMODIUM) capsule 2 mg  2 mg Oral 4x Daily PRN    0.9 % sodium chloride infusion   IntraVENous PRN    dianeal lo-brandi (ULTRABAG) 2.5% 6,000 mL  6,000 mL IntraPERitoneal Daily before dinner    Phospha 250 Neutral  155-852-130 MG TABS 2 tablet  2 tablet Oral 4x Daily AC & HS    fluconazole (DIFLUCAN) tablet 100 mg  100 mg Oral Daily    lidocaine 4 % external patch 1 patch  1 patch TransDERmal Daily    insulin lispro (HUMALOG) injection vial 0-4 Units  0-4 Units SubCUTAneous TID WC    insulin lispro (HUMALOG) injection vial 0-4 Units  0-4 Units SubCUTAneous Nightly    glucose chewable tablet 16 g  4 tablet Oral PRN    dextrose bolus 10% 125 mL  125 mL IntraVENous PRN    Or    dextrose bolus 10% 250 mL  250 mL IntraVENous PRN    glucagon (rDNA) injection 1 mg  1 mg SubCUTAneous PRN    dextrose 10 % infusion   IntraVENous Continuous PRN    insulin glargine (LANTUS) injection vial 6 Units  6 Units SubCUTAneous Nightly    epoetin mohan (EPOGEN;PROCRIT) injection 20,000 Units  20,000 Units SubCUTAneous Once per day on Tue Thu Sat    0.9 % sodium chloride infusion   IntraVENous PRN    rosuvastatin (CRESTOR) tablet 10 mg  10 mg Oral Daily    dianeal lo-brandi 2.5% 6,000 mL  6,000 mL IntraPERitoneal Daily before dinner    dianeal lo-brandi 2.5% 6,000 mL  6,000 mL IntraPERitoneal Daily before dinner    oxyCODONE (ROXICODONE) immediate release tablet 5 mg  5 mg Oral Q4H PRN    [Held by provider] apixaban (ELIQUIS) tablet 2.5 mg  2.5 mg Oral BID    aspirin chewable tablet 81 mg  81 mg Oral Daily    folic acid (FOLVITE) tablet 1 mg  1 mg Oral Daily    DULoxetine (CYMBALTA) extended release capsule 20 mg  20 mg Oral BID    carvedilol (COREG) tablet 6.25 mg  6.25 mg Oral BID WC    melatonin tablet 6 mg  6 mg Oral Nightly PRN    arformoterol 15 mcg-budesonide 0.25 mg neb solution   Nebulization BID RT    montelukast (SINGULAIR) tablet 10 mg  10 mg Oral Nightly    sodium chloride flush 0.9 % injection 5-40 mL  5-40 mL IntraVENous 2 times per day    sodium chloride flush 0.9 % injection 5-40 mL  5-40 mL IntraVENous PRN    0.9 % sodium chloride infusion   IntraVENous PRN    acetaminophen (TYLENOL) tablet 650 mg  650 mg Oral Q6H PRN    Or

## 2024-02-02 NOTE — PLAN OF CARE
Problem: Pain  Goal: Verbalizes/displays adequate comfort level or baseline comfort level  Outcome: Progressing  Flowsheets (Taken 2/2/2024 1026)  Verbalizes/displays adequate comfort level or baseline comfort level:   Encourage patient to monitor pain and request assistance   Administer analgesics based on type and severity of pain and evaluate response   Consider cultural and social influences on pain and pain management   Assess pain using appropriate pain scale   Implement non-pharmacological measures as appropriate and evaluate response   Notify Licensed Independent Practitioner if interventions unsuccessful or patient reports new pain

## 2024-02-02 NOTE — PROGRESS NOTES
Spiritual Care Assessment/Progress Note  [facility]    Name: Galilea Aguilar MRN: [id number]    Age: 68 y.o.     Sex: female   Language: English     Date: 2/2/2024            Total Time Calculated: 8 min              Spiritual Assessment begun in [facility] MED SURG  Service Provided For:: Patient  Referral/Consult From:: Rounding  Encounter Overview/Reason : Initial Encounter    Spiritual beliefs:      [x] Involved in a filemon tradition/spiritual practice:      [x] Supported by a filemon community:      [] Claims no spiritual orientation:      [] Seeking spiritual identity:           [] Adheres to an individual form of spirituality:      [] Not able to assess:                Identified resources for coping and support system:   Support System: Children, Family members       [x] Prayer                  [] Devotional reading               [] Music                  [] Guided Imagery     [] Pet visits                                        [] Other: (COMMENT)     Specific area/focus of visit   Encounter:    Crisis:    Spiritual/Emotional needs: Type: Spiritual Support  Ritual, Rites and Sacraments:    Grief, Loss, and Adjustments:    Ethics/Mediation:    Behavioral Health:    Palliative Care:    Advance Care Planning:      Plan/Referrals: Continue Support (comment) (May need another visit but not urgent.)    Narrative:     This is a random initial visit of the  in . The patient was seated beside her bed. The patient's well being is evident in her cheerful ways. Pt said her filemon community and family members are regular contact with her. Pt did not want them to visit her because she could see all of them over the phone. Pt asked for prayers, and the  provided the prayer. She felt at peace. The  informed her that she could call the 's office for additional support.    Chaplain Bertrand paul  [phone]

## 2024-02-02 NOTE — PLAN OF CARE
Problem: Physical Therapy - Adult  Goal: By Discharge: Performs mobility at highest level of function for planned discharge setting.  See evaluation for individualized goals.  Description: FUNCTIONAL STATUS PRIOR TO ADMISSION: Patient was modified independent using a rolling walker within her home and a wheelchair in the community for functional mobility.  Pt endorses having several falls in the past year.  The last fall occurred in early January 2024.     HOME SUPPORT PRIOR TO ADMISSION: The patient lived with her son and required occasional assistance for bathing and dressing.  Pt reports her family would standby when ambulating due to her fall risk.    Physical Therapy Goals  Initiated 1/27/2024  1.  Patient will move from supine to sit and sit to supine in bed with independence within 7 day(s).    2.  Patient will perform sit to stand with supervision within 7 day(s).  3.  Patient will transfer from bed to chair and chair to bed with supervision using the least restrictive device within 7 day(s).  4.  Patient will ambulate with supervision for 100 feet with the least restrictive device within 7 day(s).   5.  Patient will ascend/descend 4 stairs with 1 handrail(s) with contact guard assist within 7 day(s).   Outcome: Progressing   PHYSICAL THERAPY TREATMENT    Patient: Galilea Aguilar (68 y.o. female)  Date: 2/2/2024  Diagnosis: Hypokalemia [E87.6]  Hypomagnesemia [E83.42]  Generalized abdominal pain [R10.84]  ESRD on dialysis (HCC) [N18.6, Z99.2]  Severe sepsis (HCC) [A41.9, R65.20] Severe sepsis (HCC)      Precautions: Fall Risk, contact      ASSESSMENT:  Patient continues to benefit from skilled PT services and is slowly progressing towards goals. Galilea was seen this afternoon post neha-care/toileting needs with OT. This enabled pt the time/energy to complete further bed mobility, then transfers and gait training with BP checks. Pt reports some initial dizziness with sitting, though this did not worsen with  Intact  Standing: Impaired  Standing - Static: Constant support;Fair  Standing - Dynamic: Constant support;Fair   Ambulation/Gait Training:     Gait  Overall Level of Assistance: Contact-guard assistance;Additional time  Distance (ft): 15 Feet (x2; pt ambulated to doorway and back to chair)  Assistive Device: Gait belt;Walker, rolling  Interventions: Safety awareness training;Tactile cues;Verbal cues;Demonstration  Base of Support: Widened  Speed/Tanya: Slow;Shuffled  Step Length: Right shortened;Left shortened  Gait Abnormalities: Shuffling gait;Decreased step clearance    Activity Tolerance:   Fair , requires rest breaks, SpO2 stable on room air, and pt with initial 15pt drop in BP though this did not progress with standing activity    After treatment:   Patient left in no apparent distress sitting up in chair and Call bell within reach      COMMUNICATION/EDUCATION:   The patient's plan of care was discussed with: registered nurse    Patient Education  Education Given To: Patient  Education Provided: Role of Therapy;Plan of Care;Transfer Training;Fall Prevention Strategies  Education Provided Comments: Pt encouraged and educated on importance of mobility and being in chair for meals as able  Education Method: Verbal  Barriers to Learning: None  Education Outcome: Verbalized understanding;Continued education needed      FLORI VENEGAS, PT  Minutes: 25

## 2024-02-03 LAB
ALBUMIN SERPL-MCNC: 1.4 G/DL (ref 3.5–5)
ANION GAP SERPL CALC-SCNC: 6 MMOL/L (ref 5–15)
BACTERIA SPEC CULT: NORMAL
BUN SERPL-MCNC: 14 MG/DL (ref 6–20)
BUN/CREAT SERPL: 3 (ref 12–20)
CALCIUM SERPL-MCNC: 7.5 MG/DL (ref 8.5–10.1)
CHLORIDE SERPL-SCNC: 104 MMOL/L (ref 97–108)
CO2 SERPL-SCNC: 29 MMOL/L (ref 21–32)
CREAT SERPL-MCNC: 4.86 MG/DL (ref 0.55–1.02)
ERYTHROCYTE [DISTWIDTH] IN BLOOD BY AUTOMATED COUNT: 16.3 % (ref 11.5–14.5)
GLUCOSE BLD STRIP.AUTO-MCNC: 153 MG/DL (ref 65–117)
GLUCOSE BLD STRIP.AUTO-MCNC: 198 MG/DL (ref 65–117)
GLUCOSE BLD STRIP.AUTO-MCNC: 80 MG/DL (ref 65–117)
GLUCOSE BLD STRIP.AUTO-MCNC: 88 MG/DL (ref 65–117)
GLUCOSE SERPL-MCNC: 129 MG/DL (ref 65–100)
HCT VFR BLD AUTO: 25.7 % (ref 35–47)
HGB BLD-MCNC: 7.9 G/DL (ref 11.5–16)
MCH RBC QN AUTO: 27.8 PG (ref 26–34)
MCHC RBC AUTO-ENTMCNC: 30.7 G/DL (ref 30–36.5)
MCV RBC AUTO: 90.5 FL (ref 80–99)
NRBC # BLD: 0 K/UL (ref 0–0.01)
NRBC BLD-RTO: 0 PER 100 WBC
PHOSPHATE SERPL-MCNC: 6.4 MG/DL (ref 2.6–4.7)
PLATELET # BLD AUTO: 168 K/UL (ref 150–400)
PMV BLD AUTO: 10 FL (ref 8.9–12.9)
POTASSIUM SERPL-SCNC: 4.8 MMOL/L (ref 3.5–5.1)
RBC # BLD AUTO: 2.84 M/UL (ref 3.8–5.2)
SERVICE CMNT-IMP: ABNORMAL
SERVICE CMNT-IMP: ABNORMAL
SERVICE CMNT-IMP: NORMAL
SODIUM SERPL-SCNC: 139 MMOL/L (ref 136–145)
VANCOMYCIN SERPL-MCNC: 15.8 UG/ML
WBC # BLD AUTO: 5.8 K/UL (ref 3.6–11)

## 2024-02-03 PROCEDURE — 6370000000 HC RX 637 (ALT 250 FOR IP): Performed by: NURSE PRACTITIONER

## 2024-02-03 PROCEDURE — 80069 RENAL FUNCTION PANEL: CPT

## 2024-02-03 PROCEDURE — 82962 GLUCOSE BLOOD TEST: CPT

## 2024-02-03 PROCEDURE — 6370000000 HC RX 637 (ALT 250 FOR IP): Performed by: STUDENT IN AN ORGANIZED HEALTH CARE EDUCATION/TRAINING PROGRAM

## 2024-02-03 PROCEDURE — 2580000003 HC RX 258: Performed by: NURSE PRACTITIONER

## 2024-02-03 PROCEDURE — 6370000000 HC RX 637 (ALT 250 FOR IP): Performed by: HOSPITALIST

## 2024-02-03 PROCEDURE — 6370000000 HC RX 637 (ALT 250 FOR IP): Performed by: INTERNAL MEDICINE

## 2024-02-03 PROCEDURE — 6360000002 HC RX W HCPCS: Performed by: STUDENT IN AN ORGANIZED HEALTH CARE EDUCATION/TRAINING PROGRAM

## 2024-02-03 PROCEDURE — 6360000002 HC RX W HCPCS: Performed by: NURSE PRACTITIONER

## 2024-02-03 PROCEDURE — 36415 COLL VENOUS BLD VENIPUNCTURE: CPT

## 2024-02-03 PROCEDURE — 2060000000 HC ICU INTERMEDIATE R&B

## 2024-02-03 PROCEDURE — 2500000003 HC RX 250 WO HCPCS: Performed by: NURSE PRACTITIONER

## 2024-02-03 PROCEDURE — 85027 COMPLETE CBC AUTOMATED: CPT

## 2024-02-03 PROCEDURE — 80202 ASSAY OF VANCOMYCIN: CPT

## 2024-02-03 PROCEDURE — 2580000003 HC RX 258: Performed by: INTERNAL MEDICINE

## 2024-02-03 PROCEDURE — 94640 AIRWAY INHALATION TREATMENT: CPT

## 2024-02-03 RX ADMIN — FLUCONAZOLE 100 MG: 100 TABLET ORAL at 08:35

## 2024-02-03 RX ADMIN — DIBASIC SODIUM PHOSPHATE, MONOBASIC POTASSIUM PHOSPHATE AND MONOBASIC SODIUM PHOSPHATE 2 TABLET: 852; 155; 130 TABLET ORAL at 20:43

## 2024-02-03 RX ADMIN — OXYCODONE HYDROCHLORIDE 5 MG: 5 TABLET ORAL at 12:39

## 2024-02-03 RX ADMIN — ASPIRIN 81 MG CHEWABLE TABLET 81 MG: 81 TABLET CHEWABLE at 08:36

## 2024-02-03 RX ADMIN — SODIUM CHLORIDE, PRESERVATIVE FREE 10 ML: 5 INJECTION INTRAVENOUS at 20:44

## 2024-02-03 RX ADMIN — POTASSIUM CHLORIDE 40 MEQ: 750 TABLET, FILM COATED, EXTENDED RELEASE ORAL at 20:43

## 2024-02-03 RX ADMIN — GENTAMICIN SULFATE: 1 CREAM TOPICAL at 14:56

## 2024-02-03 RX ADMIN — CARVEDILOL 6.25 MG: 3.12 TABLET, FILM COATED ORAL at 08:35

## 2024-02-03 RX ADMIN — DIBASIC SODIUM PHOSPHATE, MONOBASIC POTASSIUM PHOSPHATE AND MONOBASIC SODIUM PHOSPHATE 2 TABLET: 852; 155; 130 TABLET ORAL at 10:13

## 2024-02-03 RX ADMIN — SODIUM CHLORIDE, SODIUM LACTATE, CALCIUM CHLORIDE, MAGNESIUM CHLORIDE AND DEXTROSE 6000 ML: 2.5; 538; 448; 18.3; 5.08 INJECTION, SOLUTION INTRAPERITONEAL at 14:55

## 2024-02-03 RX ADMIN — ARFORMOTEROL TARTRATE: 15 SOLUTION RESPIRATORY (INHALATION) at 07:41

## 2024-02-03 RX ADMIN — ROSUVASTATIN 10 MG: 10 TABLET, FILM COATED ORAL at 08:36

## 2024-02-03 RX ADMIN — ERYTHROPOIETIN 20000 UNITS: 20000 INJECTION, SOLUTION INTRAVENOUS; SUBCUTANEOUS at 18:05

## 2024-02-03 RX ADMIN — OXYCODONE HYDROCHLORIDE 5 MG: 5 TABLET ORAL at 08:39

## 2024-02-03 RX ADMIN — ARFORMOTEROL TARTRATE: 15 SOLUTION RESPIRATORY (INHALATION) at 19:42

## 2024-02-03 RX ADMIN — FOLIC ACID 1 MG: 1 TABLET ORAL at 08:36

## 2024-02-03 RX ADMIN — DULOXETINE HYDROCHLORIDE 20 MG: 20 CAPSULE, DELAYED RELEASE ORAL at 20:43

## 2024-02-03 RX ADMIN — DULOXETINE HYDROCHLORIDE 20 MG: 20 CAPSULE, DELAYED RELEASE ORAL at 08:35

## 2024-02-03 RX ADMIN — POTASSIUM CHLORIDE 40 MEQ: 750 TABLET, FILM COATED, EXTENDED RELEASE ORAL at 08:35

## 2024-02-03 RX ADMIN — CARVEDILOL 6.25 MG: 3.12 TABLET, FILM COATED ORAL at 17:14

## 2024-02-03 RX ADMIN — MONTELUKAST 10 MG: 10 TABLET, FILM COATED ORAL at 20:43

## 2024-02-03 RX ADMIN — OXYCODONE HYDROCHLORIDE 5 MG: 5 TABLET ORAL at 20:49

## 2024-02-03 RX ADMIN — SODIUM CHLORIDE, PRESERVATIVE FREE 10 ML: 5 INJECTION INTRAVENOUS at 08:36

## 2024-02-03 RX ADMIN — DIBASIC SODIUM PHOSPHATE, MONOBASIC POTASSIUM PHOSPHATE AND MONOBASIC SODIUM PHOSPHATE 2 TABLET: 852; 155; 130 TABLET ORAL at 18:41

## 2024-02-03 RX ADMIN — INSULIN GLARGINE 6 UNITS: 100 INJECTION, SOLUTION SUBCUTANEOUS at 20:43

## 2024-02-03 ASSESSMENT — PAIN SCALES - GENERAL
PAINLEVEL_OUTOF10: 6
PAINLEVEL_OUTOF10: 8
PAINLEVEL_OUTOF10: 2
PAINLEVEL_OUTOF10: 7

## 2024-02-03 ASSESSMENT — PAIN DESCRIPTION - LOCATION
LOCATION: BACK
LOCATION: BACK

## 2024-02-03 NOTE — PROGRESS NOTES
Pharmacist Note - Vancomycin Dosing  Therapy day 9  Indication: Peritonitis  Current regimen: IP dosing    Recent Labs     02/01/24  0354 02/02/24  0318 02/03/24  0311   WBC 5.4 5.1 5.8   CREATININE 4.70* 4.60* 4.86*   BUN 14 13 14       A random vancomycin level of 15.8 mcg/mL was obtained ~4 days from last IP dose    Goal target range Trough 10-15 mcg/mL      Plan: Continue to hold vanc (discussed with Dr. Mary- does NOT need a dose today); will repeat random level tomorrow AM- anticipate level will be <15 and will re-dose. Pharmacy will continue to monitor this patient daily for changes in clinical status and renal function.

## 2024-02-03 NOTE — FLOWSHEET NOTE
02/03/24 0640   Observations & Evaluations   Level of Consciousness 0   Oriented X 4   Heart Rhythm Regular   Respiratory Quality/Effort Unlabored   O2 Device None (Room air)   Skin Color Other (comment)  (Normal for ethnicity)   Skin Condition/Temp Warm;Dry   Abdomen Inspection Soft;Obese   Peritoneal Dialysis Catheter Mid lower abdomen   No placement date or time found.   Catheter Location: Mid lower abdomen   Status Deaccessed   Site Condition Clean, dry, intact   Dressing Gauze   Date of Last Dressing Change 02/02/24   Dialysis Type Continuous cycling   Catheter Care Given Yes  (Two minute Alcavis scrub/soak performed prior to disconnection.)   Post-Treatment (Cycler)   Average Dwell Time (Hours:Minutes) :49   Lost Dwell Time (Hours:Minutes) 2:40   Effluent Appearance Clear;Yellow   Volume Gain (mL) 264 mL  (Initial drain 846 ml + total UF -110 - last fill 1000 = -264 ml)     Alarms:  low UF, Check patient line    Primary RN SBAR: Luis Fernando Armando RN  Comments: After catheter disinfection patient disconnected and sterile mini cap placed. PD catheter taped securely to the patient's abdomen.  Used lines, bags and cassette discarded in red bin.

## 2024-02-03 NOTE — PROGRESS NOTES
Vanc levels still> 15  Does need a dose today   PD without issues. Continue current CCPD      Sophia Mary MD  [facility]  Office :[phone]  Fax: [phone]

## 2024-02-03 NOTE — FLOWSHEET NOTE
CCPD Connection: 02/03/24 1456   Vitals   /68   Temp 97.9 °F (36.6 °C)   Temp Source Oral   Pulse 89   Respirations 16   Observations & Evaluations   Level of Consciousness 0   Oriented X 4   Heart Rhythm   (remote tele)   Respiratory Quality/Effort Unlabored   O2 Device None (Room air)   Bilateral Breath Sounds Clear   Skin Condition/Temp Warm;Dry   Abdomen Inspection Soft;Rounded   Edema None   Peritoneal Dialysis Catheter Mid lower abdomen   No placement date or time found.   Catheter Location: Mid lower abdomen   Status Accessed   Site Condition Clean, dry, intact   Dressing Status New dressing applied   Dressing Gauze   Date of Last Dressing Change 02/03/24   Dialysis Type Continuous cycling   Exit Site Condition Good   Catheter Care Given Yes  (Two minute Alcavis scrub/soak performed prior to disconnection)   Cycler   Verification of Prescription CCPD   Informed Consent  Yes   Total Volume Programmed 08916 mL   Therapy Time (Hours:Minutes) 9:30   Cycler Type [facility]   Fill Volume 2300 mL   Last Fill Volume 1000 mL   Dextrose Setting Same (Nonextraneal)   I Drain Alarm 750 mL   Number of Cycles 5   Bag Volume 6000 mL   Number of Bags Used 3   Dianeal Solution   (Dextrose 2.5% in 6000 mL)     Hepatitis B Surface Ag   Date/Time Value Ref Range Status   01/28/2024 07:09 AM <0.10 Index Final     Hep B S Ab   Date/Time Value Ref Range Status   01/31/2024 06:50 AM <3.10 mIU/mL Final   Time Out (time): 1450  Primary RN SBAR: EV Beckman, RN  Patient Education: infection prevention  Comments: Remained present during initial drain followed by initiation of first fill. Prior to departure, bed in lowest position, call bell and personal belongings within reach. Lines visible and secure.    Start time: 1500   (2/3/24)  Estimated End Time: 0030   (2/4/24)

## 2024-02-03 NOTE — PROGRESS NOTES
Hospitalist Progress Note  Emmanuel Anders MD  Answering service: [phone] OR [phone] from in house phone        Date of Service:  2/3/2024  NAME:  Galilea Aguilar  :  1955  MRN:  [id number]      Admission Summary:   Hpi quoted: \"Galilea Aguilar is a 68 y.o. female who presented to the ED with generalized abdominal pain, intermittent chest pain X 1 week and fatigue.  Additionally reports vomiting and diarrhea X few days.  Denies fevers.  Denies cough or congestion.  Hypotension with PD this week and hypotensive upon arrival in the ED.     At the bedside patient is alert cooperative and interactive with assessment.  No residual deficits from recent stroke in December.  Abdominal pain is still generalized but endorses worse in the flank areas, ongoing nausea.     PMH of CABG in 2023, ESRD on peritoneal dialysis, multiple recurrent small CVA 2023, HTN, suspected a-fib on eliquis, IDDM, HLD, \"    Interval history / Subjective:   Seen and examined for follow up peritonitis. Denies new complaints. She does say that she is still having some abdominal pain. Discussed with her the possibility of DC tomorrow.      Assessment & Plan:     Severe Sepsis 2/2 Peritonitis  - evidenced by peritoneal infection, hypotension, elevated lactate, increased respiratory rate on admission  -Fluid resuscitation started in the ED however less than 30 mL/kg secondary to history of ESRD PD  - PD Cult: Staph Epi Blood cultures CoNS   - Plan to restart IP Vanc,  will need ~ 4 weeks nephrology will arrange   - Change to oral Diflucan 100 mg QD  -continue current management   -likely will get a dose of vancomycin tomorrow and be able to DC to continue antibiotics as an outpatient      Chronic anemia ? Anemia of CKD vs others  - continue LIS  - hgb drop, held eliquis, trend, transfuse for hgb less than 7  -- cont PPI  -- GI has seen, no

## 2024-02-04 LAB
ALBUMIN SERPL-MCNC: 1.6 G/DL (ref 3.5–5)
ALBUMIN/GLOB SERPL: 0.4 (ref 1.1–2.2)
ALP SERPL-CCNC: 120 U/L (ref 45–117)
ALT SERPL-CCNC: 9 U/L (ref 12–78)
ANION GAP SERPL CALC-SCNC: 5 MMOL/L (ref 5–15)
AST SERPL-CCNC: 14 U/L (ref 15–37)
BASOPHILS # BLD: 0.1 K/UL (ref 0–0.1)
BASOPHILS NFR BLD: 1 % (ref 0–1)
BILIRUB SERPL-MCNC: 0.5 MG/DL (ref 0.2–1)
BUN SERPL-MCNC: 14 MG/DL (ref 6–20)
BUN/CREAT SERPL: 3 (ref 12–20)
CALCIUM SERPL-MCNC: 6.8 MG/DL (ref 8.5–10.1)
CHLORIDE SERPL-SCNC: 104 MMOL/L (ref 97–108)
CO2 SERPL-SCNC: 31 MMOL/L (ref 21–32)
CREAT SERPL-MCNC: 4.97 MG/DL (ref 0.55–1.02)
DIFFERENTIAL METHOD BLD: ABNORMAL
EOSINOPHIL # BLD: 0.2 K/UL (ref 0–0.4)
EOSINOPHIL NFR BLD: 4 % (ref 0–7)
ERYTHROCYTE [DISTWIDTH] IN BLOOD BY AUTOMATED COUNT: 16.5 % (ref 11.5–14.5)
GLOBULIN SER CALC-MCNC: 4.2 G/DL (ref 2–4)
GLUCOSE BLD STRIP.AUTO-MCNC: 126 MG/DL (ref 65–117)
GLUCOSE BLD STRIP.AUTO-MCNC: 73 MG/DL (ref 65–117)
GLUCOSE BLD STRIP.AUTO-MCNC: 82 MG/DL (ref 65–117)
GLUCOSE BLD STRIP.AUTO-MCNC: 87 MG/DL (ref 65–117)
GLUCOSE SERPL-MCNC: 82 MG/DL (ref 65–100)
HCT VFR BLD AUTO: 28.3 % (ref 35–47)
HCT VFR BLD AUTO: 31 % (ref 35–47)
HGB BLD-MCNC: 8.6 G/DL (ref 11.5–16)
HGB BLD-MCNC: 9.5 G/DL (ref 11.5–16)
IMM GRANULOCYTES # BLD AUTO: 0 K/UL (ref 0–0.04)
IMM GRANULOCYTES NFR BLD AUTO: 1 % (ref 0–0.5)
LYMPHOCYTES # BLD: 2 K/UL (ref 0.8–3.5)
LYMPHOCYTES NFR BLD: 31 % (ref 12–49)
MCH RBC QN AUTO: 27.6 PG (ref 26–34)
MCHC RBC AUTO-ENTMCNC: 30.4 G/DL (ref 30–36.5)
MCV RBC AUTO: 90.7 FL (ref 80–99)
MONOCYTES # BLD: 0.5 K/UL (ref 0–1)
MONOCYTES NFR BLD: 8 % (ref 5–13)
NEUTS SEG # BLD: 3.5 K/UL (ref 1.8–8)
NEUTS SEG NFR BLD: 55 % (ref 32–75)
NRBC # BLD: 0.02 K/UL (ref 0–0.01)
NRBC BLD-RTO: 0.3 PER 100 WBC
PHOSPHATE SERPL-MCNC: 7 MG/DL (ref 2.6–4.7)
PLATELET # BLD AUTO: 169 K/UL (ref 150–400)
PMV BLD AUTO: 9.7 FL (ref 8.9–12.9)
POTASSIUM SERPL-SCNC: 5.6 MMOL/L (ref 3.5–5.1)
PROT SERPL-MCNC: 5.8 G/DL (ref 6.4–8.2)
RBC # BLD AUTO: 3.12 M/UL (ref 3.8–5.2)
SERVICE CMNT-IMP: ABNORMAL
SERVICE CMNT-IMP: NORMAL
SODIUM SERPL-SCNC: 140 MMOL/L (ref 136–145)
VANCOMYCIN SERPL-MCNC: 13.8 UG/ML
WBC # BLD AUTO: 6.4 K/UL (ref 3.6–11)

## 2024-02-04 PROCEDURE — 85018 HEMOGLOBIN: CPT

## 2024-02-04 PROCEDURE — 2060000000 HC ICU INTERMEDIATE R&B

## 2024-02-04 PROCEDURE — 84100 ASSAY OF PHOSPHORUS: CPT

## 2024-02-04 PROCEDURE — 82962 GLUCOSE BLOOD TEST: CPT

## 2024-02-04 PROCEDURE — 6370000000 HC RX 637 (ALT 250 FOR IP): Performed by: HOSPITALIST

## 2024-02-04 PROCEDURE — 85014 HEMATOCRIT: CPT

## 2024-02-04 PROCEDURE — A4722 DIALYS SOL FLD VOL > 1999CC: HCPCS | Performed by: INTERNAL MEDICINE

## 2024-02-04 PROCEDURE — 6370000000 HC RX 637 (ALT 250 FOR IP): Performed by: INTERNAL MEDICINE

## 2024-02-04 PROCEDURE — 36415 COLL VENOUS BLD VENIPUNCTURE: CPT

## 2024-02-04 PROCEDURE — 6370000000 HC RX 637 (ALT 250 FOR IP): Performed by: STUDENT IN AN ORGANIZED HEALTH CARE EDUCATION/TRAINING PROGRAM

## 2024-02-04 PROCEDURE — 2580000003 HC RX 258: Performed by: INTERNAL MEDICINE

## 2024-02-04 PROCEDURE — 2580000003 HC RX 258: Performed by: NURSE PRACTITIONER

## 2024-02-04 PROCEDURE — 6360000002 HC RX W HCPCS: Performed by: INTERNAL MEDICINE

## 2024-02-04 PROCEDURE — 90945 DIALYSIS ONE EVALUATION: CPT

## 2024-02-04 PROCEDURE — 85025 COMPLETE CBC W/AUTO DIFF WBC: CPT

## 2024-02-04 PROCEDURE — 6360000002 HC RX W HCPCS: Performed by: NURSE PRACTITIONER

## 2024-02-04 PROCEDURE — 80053 COMPREHEN METABOLIC PANEL: CPT

## 2024-02-04 PROCEDURE — 6370000000 HC RX 637 (ALT 250 FOR IP): Performed by: NURSE PRACTITIONER

## 2024-02-04 PROCEDURE — 80202 ASSAY OF VANCOMYCIN: CPT

## 2024-02-04 PROCEDURE — 94640 AIRWAY INHALATION TREATMENT: CPT

## 2024-02-04 RX ADMIN — DIBASIC SODIUM PHOSPHATE, MONOBASIC POTASSIUM PHOSPHATE AND MONOBASIC SODIUM PHOSPHATE 2 TABLET: 852; 155; 130 TABLET ORAL at 07:08

## 2024-02-04 RX ADMIN — APIXABAN 2.5 MG: 5 TABLET, FILM COATED ORAL at 22:31

## 2024-02-04 RX ADMIN — OXYCODONE HYDROCHLORIDE 5 MG: 5 TABLET ORAL at 22:36

## 2024-02-04 RX ADMIN — OXYCODONE HYDROCHLORIDE 5 MG: 5 TABLET ORAL at 14:55

## 2024-02-04 RX ADMIN — ARFORMOTEROL TARTRATE: 15 SOLUTION RESPIRATORY (INHALATION) at 07:55

## 2024-02-04 RX ADMIN — ROSUVASTATIN 10 MG: 10 TABLET, FILM COATED ORAL at 08:33

## 2024-02-04 RX ADMIN — LOPERAMIDE HYDROCHLORIDE 2 MG: 2 CAPSULE ORAL at 14:45

## 2024-02-04 RX ADMIN — FOLIC ACID 1 MG: 1 TABLET ORAL at 08:33

## 2024-02-04 RX ADMIN — ASPIRIN 81 MG CHEWABLE TABLET 81 MG: 81 TABLET CHEWABLE at 08:33

## 2024-02-04 RX ADMIN — CARVEDILOL 6.25 MG: 3.12 TABLET, FILM COATED ORAL at 08:33

## 2024-02-04 RX ADMIN — FLUCONAZOLE 100 MG: 100 TABLET ORAL at 08:33

## 2024-02-04 RX ADMIN — INSULIN GLARGINE 6 UNITS: 100 INJECTION, SOLUTION SUBCUTANEOUS at 22:30

## 2024-02-04 RX ADMIN — DULOXETINE HYDROCHLORIDE 20 MG: 20 CAPSULE, DELAYED RELEASE ORAL at 22:30

## 2024-02-04 RX ADMIN — SODIUM CHLORIDE, SODIUM LACTATE, CALCIUM CHLORIDE, MAGNESIUM CHLORIDE AND DEXTROSE 6000 ML: 2.5; 538; 448; 18.3; 5.08 INJECTION, SOLUTION INTRAPERITONEAL at 20:55

## 2024-02-04 RX ADMIN — DIBASIC SODIUM PHOSPHATE, MONOBASIC POTASSIUM PHOSPHATE AND MONOBASIC SODIUM PHOSPHATE 2 TABLET: 852; 155; 130 TABLET ORAL at 17:26

## 2024-02-04 RX ADMIN — SODIUM CHLORIDE, PRESERVATIVE FREE 10 ML: 5 INJECTION INTRAVENOUS at 22:33

## 2024-02-04 RX ADMIN — MONTELUKAST 10 MG: 10 TABLET, FILM COATED ORAL at 22:30

## 2024-02-04 RX ADMIN — ARFORMOTEROL TARTRATE: 15 SOLUTION RESPIRATORY (INHALATION) at 20:45

## 2024-02-04 RX ADMIN — VANCOMYCIN HYDROCHLORIDE: 1 INJECTION, POWDER, LYOPHILIZED, FOR SOLUTION INTRAVENOUS at 11:39

## 2024-02-04 RX ADMIN — GENTAMICIN SULFATE: 1 CREAM TOPICAL at 20:59

## 2024-02-04 RX ADMIN — DULOXETINE HYDROCHLORIDE 20 MG: 20 CAPSULE, DELAYED RELEASE ORAL at 08:33

## 2024-02-04 RX ADMIN — POTASSIUM CHLORIDE 40 MEQ: 750 TABLET, FILM COATED, EXTENDED RELEASE ORAL at 22:30

## 2024-02-04 RX ADMIN — DIBASIC SODIUM PHOSPHATE, MONOBASIC POTASSIUM PHOSPHATE AND MONOBASIC SODIUM PHOSPHATE 2 TABLET: 852; 155; 130 TABLET ORAL at 11:32

## 2024-02-04 RX ADMIN — POTASSIUM CHLORIDE 40 MEQ: 750 TABLET, FILM COATED, EXTENDED RELEASE ORAL at 08:32

## 2024-02-04 RX ADMIN — APIXABAN 2.5 MG: 5 TABLET, FILM COATED ORAL at 08:33

## 2024-02-04 RX ADMIN — CARVEDILOL 6.25 MG: 3.12 TABLET, FILM COATED ORAL at 17:26

## 2024-02-04 RX ADMIN — DIBASIC SODIUM PHOSPHATE, MONOBASIC POTASSIUM PHOSPHATE AND MONOBASIC SODIUM PHOSPHATE 2 TABLET: 852; 155; 130 TABLET ORAL at 22:30

## 2024-02-04 ASSESSMENT — PAIN SCALES - GENERAL
PAINLEVEL_OUTOF10: 0
PAINLEVEL_OUTOF10: 8
PAINLEVEL_OUTOF10: 0
PAINLEVEL_OUTOF10: 0

## 2024-02-04 ASSESSMENT — PAIN DESCRIPTION - LOCATION: LOCATION: BACK;FLANK

## 2024-02-04 NOTE — FLOWSHEET NOTE
02/04/24 0517   Observations & Evaluations   Level of Consciousness 0   Oriented X 4   Respiratory Quality/Effort Unlabored   O2 Device None (Room air)   Skin Color Other (comment)  (Appropriate for ethnicity)   Skin Condition/Temp Warm;Dry   Abdomen Inspection Soft;Obese   Edema None   Peritoneal Dialysis Catheter Mid lower abdomen   No placement date or time found.   Catheter Location: Mid lower abdomen   Status Deaccessed   Site Condition Clean, dry, intact   Dressing Status Clean, dry & intact   Dressing Gauze   Date of Last Dressing Change 02/03/24   Dialysis Type Continuous cycling   Catheter Care Given Yes  (Two minute Alcavis scrub/soak performed prior to disconnection.)   Post-Treatment (Cycler)   Average Dwell Time (Hours:Minutes) :18   Lost Dwell Time (Hours:Minutes) 1:19   Effluent Appearance Clear;Yellow   PD Output (mL) 512 mL  (Idrain 909 ml + total  ml - last fill 1000 ml = net UF  512 ml)     Primary RN SBAR: Mahendra Chun RN  Comments: After catheter disinfection, patient disconnected and sterile mini cap placed.  Catheter taped securely to the patient's abdomen.  Used cassette, bags and lines discarded in red bin.

## 2024-02-04 NOTE — PROGRESS NOTES
Hospitalist Progress Note  Emmanuel Anders MD  Answering service: [phone] OR [phone] from in house phone        Date of Service:  2024  NAME:  Galilea Aguilar  :  1955  MRN:  [id number]      Admission Summary:   Hpi quoted: \"Galilea Aguilar is a 68 y.o. female who presented to the ED with generalized abdominal pain, intermittent chest pain X 1 week and fatigue.  Additionally reports vomiting and diarrhea X few days.  Denies fevers.  Denies cough or congestion.  Hypotension with PD this week and hypotensive upon arrival in the ED.     At the bedside patient is alert cooperative and interactive with assessment.  No residual deficits from recent stroke in December.  Abdominal pain is still generalized but endorses worse in the flank areas, ongoing nausea.     PMH of CABG in 2023, ESRD on peritoneal dialysis, multiple recurrent small CVA 2023, HTN, suspected a-fib on eliquis, IDDM, HLD, \"    Interval history / Subjective:   Seen and examined for follow up peritonitis. Doing ok. Discussed with her that she can be discharged after her PD and antibiotic. Due to this running about 9 hours, DC will have to be tomorrow.     Assessment & Plan:     Severe Sepsis 2/2 Peritonitis  - evidenced by peritoneal infection, hypotension, elevated lactate, increased respiratory rate on admission  -Fluid resuscitation started in the ED however less than 30 mL/kg secondary to history of ESRD PD  - PD Cult: Staph Epi Blood cultures CoNS   - Plan to restart IP Vanc,  will need ~ 4 weeks nephrology will arrange   - Change to oral Diflucan 100 mg QD  -continue current management   -dose of vanc with PD plan for DC after. PD runs 9 hours so she will have to be discharged tomorrow.     Chronic anemia ? Anemia of CKD vs others  - continue LIS  - hgb drop, held eliquis, trend, transfuse for hgb less than 7  -- cont PPI  -- GI has            Medications Reviewed:     Current Facility-Administered Medications   Medication Dose Route Frequency    Vancomycin random level- please draw with AM labs on 2/4  1 each Other Once    epoetin mohan (EPOGEN;PROCRIT) injection 20,000 Units  20,000 Units SubCUTAneous Once per day on Tue Thu Sat    potassium chloride (KLOR-CON) extended release tablet 40 mEq  40 mEq Oral BID    loperamide (IMODIUM) capsule 2 mg  2 mg Oral 4x Daily PRN    0.9 % sodium chloride infusion   IntraVENous PRN    dianeal lo-brandi (ULTRABAG) 2.5% 6,000 mL  6,000 mL IntraPERitoneal Daily before dinner    phosphorus (K PHOS NEUTRAL) 2 tablet  2 tablet Oral 4x Daily AC & HS    fluconazole (DIFLUCAN) tablet 100 mg  100 mg Oral Daily    lidocaine 4 % external patch 1 patch  1 patch TransDERmal Daily    insulin lispro (HUMALOG) injection vial 0-4 Units  0-4 Units SubCUTAneous TID WC    insulin lispro (HUMALOG) injection vial 0-4 Units  0-4 Units SubCUTAneous Nightly    glucose chewable tablet 16 g  4 tablet Oral PRN    dextrose bolus 10% 125 mL  125 mL IntraVENous PRN    Or    dextrose bolus 10% 250 mL  250 mL IntraVENous PRN    glucagon (rDNA) injection 1 mg  1 mg SubCUTAneous PRN    dextrose 10 % infusion   IntraVENous Continuous PRN    insulin glargine (LANTUS) injection vial 6 Units  6 Units SubCUTAneous Nightly    0.9 % sodium chloride infusion   IntraVENous PRN    rosuvastatin (CRESTOR) tablet 10 mg  10 mg Oral Daily    dianeal lo-brandi 2.5% 6,000 mL  6,000 mL IntraPERitoneal Daily before dinner    dianeal lo-brandi 2.5% 6,000 mL  6,000 mL IntraPERitoneal Daily before dinner    oxyCODONE (ROXICODONE) immediate release tablet 5 mg  5 mg Oral Q4H PRN    apixaban (ELIQUIS) tablet 2.5 mg  2.5 mg Oral BID    aspirin chewable tablet 81 mg  81 mg Oral Daily    folic acid (FOLVITE) tablet 1 mg  1 mg Oral Daily    DULoxetine (CYMBALTA) extended release capsule 20 mg  20 mg Oral BID    carvedilol (COREG) tablet 6.25 mg  6.25 mg Oral BID WC    melatonin

## 2024-02-04 NOTE — PROGRESS NOTES
Pharmacist Note - Vancomycin Dosing  Therapy day 10  Indication: Peritonitis  Current regimen: IP dosing by level    Recent Labs     02/02/24  0318 02/03/24  0311 02/04/24  0533   WBC 5.1 5.8 6.4   CREATININE 4.60* 4.86* 4.97*   BUN 13 14 14       A random vancomycin level of 13.8 mcg/mL was obtained ~4 day from last IP vanc dose.    Goal target range Trough 10-15 mcg/mL      Plan: Change to 1250 mg IP in 2 L bag x 1 today . Pharmacy will continue to monitor this patient daily for changes in clinical status and renal function.

## 2024-02-04 NOTE — PROGRESS NOTES
[address], [facility] [place] [address]  Phone: [phone]   [phone]    [email or web address]     Nephrology Progress Note    Patient Name : Galilea Aguilar      : 1955     MRN : [id number]  Date of Admission : 2024  Date of Servive : 24    CC:  Follow up for ESRD       Assessment and Plan   ESRD-PD :  - cont current CCPD Rx     Staph epi PD peritonitis:  -  IP Vanc dose today   - She will receive further IV antibiotic doses through PD clinic  - continue oral Diflucan 100 mg QD x 4 weeks     Hypokalemia  Hypophosphatemia  Hypomagnesemia  -2/2 severe malnutrition  -Continue with current supplements     Chronic malnutrition   - dietary consulted     Anemia of CKD:  - continue LIS 20K TTS  - Hgb 9.1  - transfuse for Hgb <7  - tsat 51% with ferritin 957  - b12/ folate levels normal  - FOBT (+)  -Seen by GI and no plans for endoscopy at this time  -Noted plans to restart Eliquis     HTN  - BP stable    Chronic diarrhea    CAD s/p CABG 23  DM2  COPD  HLD  Prior CVA     Interval History:  Patient seen and examined earlier this morning.  PD without issues.  PD fluid remains clear.  Vanco level 30 today.  She will receive a dose of intraperitoneal vancomycin prior to discharge today.  She has been advised to follow-up with PD clinic tomorrow to  her antibiotics.    Review of Systems: Pertinent items are noted in HPI.    Current Medications:   Current Facility-Administered Medications   Medication Dose Route Frequency    dianeal lo-brandi 1.5% 2,000 mL with vancomycin 1,250 mg solution   IntraPERitoneal Once    Vancomycin random level- please draw with AM labs on   1 each Other Once    epoetin mohan (EPOGEN;PROCRIT) injection 20,000 Units  20,000 Units SubCUTAneous Once per day on  Sat    potassium chloride (KLOR-CON) extended release tablet 40 mEq  40 mEq Oral BID    loperamide (IMODIUM) capsule 2 mg  2 mg Oral 4x Daily PRN     0.9 % sodium chloride infusion   IntraVENous PRN    dianeal lo-brandi (ULTRABAG) 2.5% 6,000 mL  6,000 mL IntraPERitoneal Daily before dinner    phosphorus (K PHOS NEUTRAL) 2 tablet  2 tablet Oral 4x Daily AC & HS    fluconazole (DIFLUCAN) tablet 100 mg  100 mg Oral Daily    lidocaine 4 % external patch 1 patch  1 patch TransDERmal Daily    insulin lispro (HUMALOG) injection vial 0-4 Units  0-4 Units SubCUTAneous TID WC    insulin lispro (HUMALOG) injection vial 0-4 Units  0-4 Units SubCUTAneous Nightly    glucose chewable tablet 16 g  4 tablet Oral PRN    dextrose bolus 10% 125 mL  125 mL IntraVENous PRN    Or    dextrose bolus 10% 250 mL  250 mL IntraVENous PRN    glucagon (rDNA) injection 1 mg  1 mg SubCUTAneous PRN    dextrose 10 % infusion   IntraVENous Continuous PRN    insulin glargine (LANTUS) injection vial 6 Units  6 Units SubCUTAneous Nightly    0.9 % sodium chloride infusion   IntraVENous PRN    rosuvastatin (CRESTOR) tablet 10 mg  10 mg Oral Daily    dianeal lo-brandi 2.5% 6,000 mL  6,000 mL IntraPERitoneal Daily before dinner    dianeal lo-brandi 2.5% 6,000 mL  6,000 mL IntraPERitoneal Daily before dinner    oxyCODONE (ROXICODONE) immediate release tablet 5 mg  5 mg Oral Q4H PRN    apixaban (ELIQUIS) tablet 2.5 mg  2.5 mg Oral BID    aspirin chewable tablet 81 mg  81 mg Oral Daily    folic acid (FOLVITE) tablet 1 mg  1 mg Oral Daily    DULoxetine (CYMBALTA) extended release capsule 20 mg  20 mg Oral BID    carvedilol (COREG) tablet 6.25 mg  6.25 mg Oral BID WC    melatonin tablet 6 mg  6 mg Oral Nightly PRN    arformoterol 15 mcg-budesonide 0.25 mg neb solution   Nebulization BID RT    montelukast (SINGULAIR) tablet 10 mg  10 mg Oral Nightly    sodium chloride flush 0.9 % injection 5-40 mL  5-40 mL IntraVENous 2 times per day    sodium chloride flush 0.9 % injection 5-40 mL  5-40 mL IntraVENous PRN    0.9 % sodium chloride infusion   IntraVENous PRN    acetaminophen (TYLENOL) tablet 650 mg  650 mg Oral Q6H PRN  in the last 72 hours.    Invalid input(s): \"PTP\"   No results for input(s): \"CPK\", \"CKMB\", \"TROPONINI\" in the last 72 hours.    Invalid input(s): \"B-NP\"  Invalid input(s): \"PHI\", \"PCO2I\", \"PO2I\", \"FIO2I\"     Ventilator:       Microbiology:  No results found for: \"SDES\"  No components found for: \"CULT\"      I have reviewed the flowsheets.  Chart and Pertinent Notes have been reviewed.   No change in PMH ,family and social history from Consult note.      MD [facility]

## 2024-02-04 NOTE — PLAN OF CARE
Problem: Nutrition Deficit:  Goal: Optimize nutritional status  Outcome: Progressing     Problem: Safety - Adult  Goal: Free from fall injury  Outcome: Progressing     Problem: Discharge Planning  Goal: Discharge to home or other facility with appropriate resources  Outcome: Progressing  Flowsheets (Taken 2/4/2024 0000)  Discharge to home or other facility with appropriate resources:   Identify barriers to discharge with patient and caregiver   Arrange for needed discharge resources and transportation as appropriate   Identify discharge learning needs (meds, wound care, etc)     Problem: Pain  Goal: Verbalizes/displays adequate comfort level or baseline comfort level  Outcome: Progressing  Flowsheets (Taken 2/4/2024 0000)  Verbalizes/displays adequate comfort level or baseline comfort level:   Encourage patient to monitor pain and request assistance   Assess pain using appropriate pain scale   Administer analgesics based on type and severity of pain and evaluate response   Implement non-pharmacological measures as appropriate and evaluate response     Problem: Skin/Tissue Integrity  Goal: Absence of new skin breakdown  Description: 1.  Monitor for areas of redness and/or skin breakdown  2.  Assess vascular access sites hourly  3.  Every 4-6 hours minimum:  Change oxygen saturation probe site  4.  Every 4-6 hours:  If on nasal continuous positive airway pressure, respiratory therapy assess nares and determine need for appliance change or resting period.  Outcome: Progressing     Problem: ABCDS Injury Assessment  Goal: Absence of physical injury  Outcome: Progressing     Problem: Chronic Conditions and Co-morbidities  Goal: Patient's chronic conditions and co-morbidity symptoms are monitored and maintained or improved  Outcome: Progressing  Flowsheets (Taken 2/4/2024 0000)  Care Plan - Patient's Chronic Conditions and Co-Morbidity Symptoms are Monitored and Maintained or Improved:   Monitor and assess patient's

## 2024-02-04 NOTE — PROGRESS NOTES
Vanc level 13   IP vanc dose today prior to d/c   She can  her Abx from PD clinic tomorrow       Sophia Mary MD  [facility]  Office :[phone]  Fax: [phone]

## 2024-02-04 NOTE — FLOWSHEET NOTE
02/04/24 1140   Observations & Evaluations   Level of Consciousness 0   Oriented X 4   Heart Rhythm Regular   Respiratory Quality/Effort Unlabored   O2 Device None (Room air)   Edema None   Peritoneal Dialysis Catheter Mid lower abdomen   No placement date or time found.   Catheter Location: Mid lower abdomen   Status Accessed   Site Condition Clean, dry, intact   Dressing Status Clean, dry & intact   Dressing Gauze   Date of Last Dressing Change 02/03/24   Dialysis Type Continuous ambulatory   Catheter Care Given Yes   Peritoneal Dialysis (CAPD manual)   Exchange Number 1   Dianeal Solution Dextrose 1.5% in 2000 mL   Dianeal Antibiotic vancomycin 1250 mg   Bag Weight (g) 2000 g   Peritoneal Input Status Started   Peritoneal Output Status Completed   Effluent Appearance Clear;Yellow   Peritoneal Dialysis Volume In (ml) 2000 ml   Dwell Time (Hours:Minutes) 6:00   Effluent Volume Out (mL) 500 ml   Balance This Exchange (mL) -1500 ml       Primary RN SBAR: Roxann Beckman, RN    Patient Education: manual exchange with abx started, to dwell for 6 hrs.  Hepatitis B Surface Ag   Date/Time Value Ref Range Status   01/28/2024 07:09 AM <0.10 Index Final     Hep B S Ab   Date/Time Value Ref Range Status   01/31/2024 06:50 AM <3.10 mIU/mL Final

## 2024-02-05 VITALS
RESPIRATION RATE: 16 BRPM | OXYGEN SATURATION: 100 % | HEART RATE: 93 BPM | HEIGHT: 68 IN | BODY MASS INDEX: 26.1 KG/M2 | WEIGHT: 172.18 LBS | SYSTOLIC BLOOD PRESSURE: 124 MMHG | TEMPERATURE: 98.1 F | DIASTOLIC BLOOD PRESSURE: 88 MMHG

## 2024-02-05 PROBLEM — R65.20 SEVERE SEPSIS (HCC): Status: RESOLVED | Noted: 2023-08-18 | Resolved: 2024-02-05

## 2024-02-05 PROBLEM — A41.9 SEVERE SEPSIS (HCC): Status: RESOLVED | Noted: 2023-08-18 | Resolved: 2024-02-05

## 2024-02-05 LAB
ALBUMIN SERPL-MCNC: 1.7 G/DL (ref 3.5–5)
ALBUMIN/GLOB SERPL: 0.3 (ref 1.1–2.2)
ALP SERPL-CCNC: 132 U/L (ref 45–117)
ALT SERPL-CCNC: 10 U/L (ref 12–78)
ANION GAP SERPL CALC-SCNC: 7 MMOL/L (ref 5–15)
AST SERPL-CCNC: 22 U/L (ref 15–37)
BASOPHILS # BLD: 0.1 K/UL (ref 0–0.1)
BASOPHILS NFR BLD: 1 % (ref 0–1)
BILIRUB SERPL-MCNC: 0.5 MG/DL (ref 0.2–1)
BUN SERPL-MCNC: 13 MG/DL (ref 6–20)
BUN/CREAT SERPL: 3 (ref 12–20)
CALCIUM SERPL-MCNC: 6.9 MG/DL (ref 8.5–10.1)
CHLORIDE SERPL-SCNC: 104 MMOL/L (ref 97–108)
CO2 SERPL-SCNC: 29 MMOL/L (ref 21–32)
CREAT SERPL-MCNC: 4.94 MG/DL (ref 0.55–1.02)
DIFFERENTIAL METHOD BLD: ABNORMAL
EOSINOPHIL # BLD: 0.2 K/UL (ref 0–0.4)
EOSINOPHIL NFR BLD: 4 % (ref 0–7)
ERYTHROCYTE [DISTWIDTH] IN BLOOD BY AUTOMATED COUNT: 16.8 % (ref 11.5–14.5)
GLOBULIN SER CALC-MCNC: 5.3 G/DL (ref 2–4)
GLUCOSE BLD STRIP.AUTO-MCNC: 139 MG/DL (ref 65–117)
GLUCOSE BLD STRIP.AUTO-MCNC: 99 MG/DL (ref 65–117)
GLUCOSE SERPL-MCNC: 118 MG/DL (ref 65–100)
HCT VFR BLD AUTO: 31 % (ref 35–47)
HGB BLD-MCNC: 9.3 G/DL (ref 11.5–16)
IMM GRANULOCYTES # BLD AUTO: 0 K/UL (ref 0–0.04)
IMM GRANULOCYTES NFR BLD AUTO: 0 % (ref 0–0.5)
LYMPHOCYTES # BLD: 2.2 K/UL (ref 0.8–3.5)
LYMPHOCYTES NFR BLD: 34 % (ref 12–49)
MCH RBC QN AUTO: 27.6 PG (ref 26–34)
MCHC RBC AUTO-ENTMCNC: 30 G/DL (ref 30–36.5)
MCV RBC AUTO: 92 FL (ref 80–99)
MONOCYTES # BLD: 0.5 K/UL (ref 0–1)
MONOCYTES NFR BLD: 7 % (ref 5–13)
NEUTS SEG # BLD: 3.4 K/UL (ref 1.8–8)
NEUTS SEG NFR BLD: 54 % (ref 32–75)
NRBC # BLD: 0.02 K/UL (ref 0–0.01)
NRBC BLD-RTO: 0.3 PER 100 WBC
PLATELET # BLD AUTO: 183 K/UL (ref 150–400)
PMV BLD AUTO: 10.4 FL (ref 8.9–12.9)
POTASSIUM SERPL-SCNC: 5.2 MMOL/L (ref 3.5–5.1)
PROT SERPL-MCNC: 7 G/DL (ref 6.4–8.2)
RBC # BLD AUTO: 3.37 M/UL (ref 3.8–5.2)
SERVICE CMNT-IMP: ABNORMAL
SERVICE CMNT-IMP: NORMAL
SODIUM SERPL-SCNC: 140 MMOL/L (ref 136–145)
WBC # BLD AUTO: 6.4 K/UL (ref 3.6–11)

## 2024-02-05 PROCEDURE — 6370000000 HC RX 637 (ALT 250 FOR IP): Performed by: HOSPITALIST

## 2024-02-05 PROCEDURE — 82962 GLUCOSE BLOOD TEST: CPT

## 2024-02-05 PROCEDURE — 97116 GAIT TRAINING THERAPY: CPT

## 2024-02-05 PROCEDURE — 36415 COLL VENOUS BLD VENIPUNCTURE: CPT

## 2024-02-05 PROCEDURE — 2580000003 HC RX 258: Performed by: NURSE PRACTITIONER

## 2024-02-05 PROCEDURE — 94640 AIRWAY INHALATION TREATMENT: CPT

## 2024-02-05 PROCEDURE — 6370000000 HC RX 637 (ALT 250 FOR IP): Performed by: NURSE PRACTITIONER

## 2024-02-05 PROCEDURE — 6370000000 HC RX 637 (ALT 250 FOR IP): Performed by: INTERNAL MEDICINE

## 2024-02-05 PROCEDURE — 6360000002 HC RX W HCPCS: Performed by: NURSE PRACTITIONER

## 2024-02-05 PROCEDURE — 85025 COMPLETE CBC W/AUTO DIFF WBC: CPT

## 2024-02-05 PROCEDURE — 6370000000 HC RX 637 (ALT 250 FOR IP): Performed by: STUDENT IN AN ORGANIZED HEALTH CARE EDUCATION/TRAINING PROGRAM

## 2024-02-05 PROCEDURE — 80053 COMPREHEN METABOLIC PANEL: CPT

## 2024-02-05 RX ORDER — FLUCONAZOLE 100 MG/1
100 TABLET ORAL DAILY
Qty: 21 TABLET | Refills: 0 | Status: ON HOLD | OUTPATIENT
Start: 2024-02-05 | End: 2024-02-26

## 2024-02-05 RX ORDER — OXYCODONE HYDROCHLORIDE 5 MG/1
5 TABLET ORAL EVERY 6 HOURS PRN
Qty: 15 TABLET | Refills: 0 | Status: SHIPPED | OUTPATIENT
Start: 2024-02-05 | End: 2024-02-09

## 2024-02-05 RX ADMIN — ASPIRIN 81 MG CHEWABLE TABLET 81 MG: 81 TABLET CHEWABLE at 09:45

## 2024-02-05 RX ADMIN — FLUCONAZOLE 100 MG: 100 TABLET ORAL at 09:45

## 2024-02-05 RX ADMIN — FOLIC ACID 1 MG: 1 TABLET ORAL at 09:45

## 2024-02-05 RX ADMIN — OXYCODONE HYDROCHLORIDE 5 MG: 5 TABLET ORAL at 09:52

## 2024-02-05 RX ADMIN — SODIUM CHLORIDE, PRESERVATIVE FREE 10 ML: 5 INJECTION INTRAVENOUS at 08:17

## 2024-02-05 RX ADMIN — CARVEDILOL 6.25 MG: 3.12 TABLET, FILM COATED ORAL at 07:33

## 2024-02-05 RX ADMIN — DIBASIC SODIUM PHOSPHATE, MONOBASIC POTASSIUM PHOSPHATE AND MONOBASIC SODIUM PHOSPHATE 2 TABLET: 852; 155; 130 TABLET ORAL at 12:20

## 2024-02-05 RX ADMIN — POTASSIUM CHLORIDE 40 MEQ: 750 TABLET, FILM COATED, EXTENDED RELEASE ORAL at 09:45

## 2024-02-05 RX ADMIN — DULOXETINE HYDROCHLORIDE 20 MG: 20 CAPSULE, DELAYED RELEASE ORAL at 09:45

## 2024-02-05 RX ADMIN — APIXABAN 2.5 MG: 5 TABLET, FILM COATED ORAL at 09:45

## 2024-02-05 RX ADMIN — DIBASIC SODIUM PHOSPHATE, MONOBASIC POTASSIUM PHOSPHATE AND MONOBASIC SODIUM PHOSPHATE 2 TABLET: 852; 155; 130 TABLET ORAL at 07:33

## 2024-02-05 RX ADMIN — ROSUVASTATIN 10 MG: 10 TABLET, FILM COATED ORAL at 09:45

## 2024-02-05 RX ADMIN — ARFORMOTEROL TARTRATE: 15 SOLUTION RESPIRATORY (INHALATION) at 08:26

## 2024-02-05 ASSESSMENT — PAIN SCALES - GENERAL: PAINLEVEL_OUTOF10: 7

## 2024-02-05 ASSESSMENT — PAIN DESCRIPTION - ORIENTATION: ORIENTATION: LEFT

## 2024-02-05 ASSESSMENT — PAIN DESCRIPTION - LOCATION: LOCATION: BACK

## 2024-02-05 NOTE — CARE COORDINATION
.Transition of Care Plan:    RUR: 31% High  Prior Level of Functioning: Modified independent, uses a walker,cane and wheelchair. Family assists with bathing and dressing.  Disposition: Home with family and resumption of HH with [facility]  Follow up appointments: PCP, Specialist  DME needed: None  Transportation at discharge: Family   IM/IMM Medicare/ letter given: 1/27/24; 2/5/27  Caregiver Contact: Son Elmo Aguilar [phone]/Daughter Chaz Aguilar [phone]  Discharge Caregiver contacted prior to discharge? Yes  Care Conference needed? No  Barriers to discharge: None    Malika Serrano, MS  [phone]

## 2024-02-05 NOTE — PLAN OF CARE
Problem: Physical Therapy - Adult  Goal: By Discharge: Performs mobility at highest level of function for planned discharge setting.  See evaluation for individualized goals.  Description: FUNCTIONAL STATUS PRIOR TO ADMISSION: Patient was modified independent using a rolling walker within her home and a wheelchair in the community for functional mobility.  Pt endorses having several falls in the past year.  The last fall occurred in early January 2024.     HOME SUPPORT PRIOR TO ADMISSION: The patient lived with her son and required occasional assistance for bathing and dressing.  Pt reports her family would standby when ambulating due to her fall risk.    Physical Therapy Goals  Initiated 1/27/2024  1.  Patient will move from supine to sit and sit to supine in bed with independence within 7 day(s).    2.  Patient will perform sit to stand with supervision within 7 day(s).  3.  Patient will transfer from bed to chair and chair to bed with supervision using the least restrictive device within 7 day(s).  4.  Patient will ambulate with supervision for 100 feet with the least restrictive device within 7 day(s).   5.  Patient will ascend/descend 4 stairs with 1 handrail(s) with contact guard assist within 7 day(s).   Outcome: Progressing     PHYSICAL THERAPY TREATMENT    Patient: Galilea Aguilar (68 y.o. female)  Date: 2/5/2024  Diagnosis: Hypokalemia [E87.6]  Hypomagnesemia [E83.42]  Generalized abdominal pain [R10.84]  ESRD on dialysis (HCC) [N18.6, Z99.2]  Severe sepsis (HCC) [A41.9, R65.20] Severe sepsis (HCC)      Precautions: Fall Risk                      ASSESSMENT:  Patient continues to benefit from skilled PT services and is progressing towards goals. PT tolerates standing from [facility] and performing hygiene at total assist. Pt able to perform gait training after standing and able to increase distance with encouragement. Pt left in chair with call bell and appears in good spirits.        PLAN:  Patient continues to  31

## 2024-02-05 NOTE — FLOWSHEET NOTE
CCPD Disconnection:     02/05/24 0906   Observations & Evaluations   Level of Consciousness 0   Heart Rhythm Regular   Respiratory Quality/Effort Unlabored   O2 Device None (Room air)   Skin Condition/Temp Dry;Warm   Abdomen Inspection Obese;Soft   Edema None   Peritoneal Dialysis Catheter Mid lower abdomen   No placement date or time found.   Catheter Location: Mid lower abdomen   Status Deaccessed   Site Condition Clean, dry, intact   Dressing Status Clean, dry & intact   Dressing Gauze   Date of Last Dressing Change 02/04/24   Dialysis Type Continuous cycling   Catheter Care Given Yes   Post-Treatment (Cycler)   Average Dwell Time (Hours:Minutes) 1:19   Lost Dwell Time (Hours:Minutes) 0:17   Effluent Appearance Clear;Yellow   Volume Gain (mL) 271 mL  (Initial Drain (1285 ml) + Total UF (444 ml) - Last Fill (2000 ml from CAPD) = -271 ml Fluid Gain)     Primary RN SBAR: EV Felipe RN  Comments: Prior to disconnection one minute Alcavis scrub & soak performed. Sterile mini cap applied & transfer set secured to pt abdomen. Old cassette & bags discarded in red biohazard bag. Pt discharging home today.

## 2024-02-05 NOTE — PLAN OF CARE
Problem: Physical Therapy - Adult  Goal: By Discharge: Performs mobility at highest level of function for planned discharge setting.  See evaluation for individualized goals.  Description: FUNCTIONAL STATUS PRIOR TO ADMISSION: Patient was modified independent using a rolling walker within her home and a wheelchair in the community for functional mobility.  Pt endorses having several falls in the past year.  The last fall occurred in early January 2024.     HOME SUPPORT PRIOR TO ADMISSION: The patient lived with her son and required occasional assistance for bathing and dressing.  Pt reports her family would standby when ambulating due to her fall risk.    Physical Therapy Goals  Initiated 1/27/2024  1.  Patient will move from supine to sit and sit to supine in bed with independence within 7 day(s).    2.  Patient will perform sit to stand with supervision within 7 day(s).  3.  Patient will transfer from bed to chair and chair to bed with supervision using the least restrictive device within 7 day(s).  4.  Patient will ambulate with supervision for 100 feet with the least restrictive device within 7 day(s).   5.  Patient will ascend/descend 4 stairs with 1 handrail(s) with contact guard assist within 7 day(s).   2/5/2024 1152 by Marlen Solano, PT  Outcome: Progressing

## 2024-02-05 NOTE — PROGRESS NOTES
[address], [facility] [place] [address]  Phone: [phone]   [phone]    [email or web address]     Nephrology Progress Note    Patient Name : Galilea Aguilar      : 1955     MRN : [id number]  Date of Admission : 2024  Date of Servive : 24    CC:  Follow up for ESRD       Assessment and Plan   ESRD-PD :  - cont current CCPD Rx     Staph epi PD peritonitis:  - IP abx set up at outpt clinic  - continue oral Diflucan 100 mg QD x 4 weeks     Hypokalemia  Hypophosphatemia  Hypomagnesemia  -2/2 severe malnutrition  -Continue with current supplements     Chronic malnutrition   - dietary consulted     Anemia of CKD:  - continue LIS 20K TTS  - Hgb 9.1  - transfuse for Hgb <7  - tsat 51% with ferritin 957  - b12/ folate levels normal  - FOBT (+)  -Seen by GI and no plans for endoscopy at this time  -Noted plans to restart Eliquis     HTN  - BP stable    Chronic diarrhea    CAD s/p CABG 23  DM2  COPD  HLD  Prior CVA     Interval History:  Patient seen and examined.  Doing ok overall.  No cp, sob, n/v/d reported.    Review of Systems: Pertinent items are noted in HPI.    Current Medications:   Current Facility-Administered Medications   Medication Dose Route Frequency    Vancomycin random level- please draw with AM labs on   1 each Other Once    epoetin mohan (EPOGEN;PROCRIT) injection 20,000 Units  20,000 Units SubCUTAneous Once per day on  Sat    potassium chloride (KLOR-CON) extended release tablet 40 mEq  40 mEq Oral BID    loperamide (IMODIUM) capsule 2 mg  2 mg Oral 4x Daily PRN    0.9 % sodium chloride infusion   IntraVENous PRN    dianeal lo-brandi (ULTRABAG) 2.5% 6,000 mL  6,000 mL IntraPERitoneal Daily before dinner    phosphorus (K PHOS NEUTRAL) 2 tablet  2 tablet Oral 4x Daily AC & HS    fluconazole (DIFLUCAN) tablet 100 mg  100 mg Oral Daily    lidocaine 4 % external patch 1 patch  1 patch TransDERmal Daily    insulin lispro  Iodinated Contrast Media Hives       Objective:  Vitals:    Vitals:    02/05/24 0556 02/05/24 0814 02/05/24 0827 02/05/24 1000   BP: 130/80 126/75     Pulse: 90 89  87   Resp:  16     Temp: 97.9 °F (36.6 °C) 98.2 °F (36.8 °C)     TempSrc:  Oral     SpO2: 95% 98% 99%    Weight:       Height:         Intake and Output:  02/05 0701 - 02/05 1900  In: 271   Out: -   02/03 1901 - 02/05 0700  In: 2000   Out: 1012     Physical Examination:  General: NAD,Conversant   Neck:  Supple, no mass  Resp:  Lungs CTA B/L, no wheezing , normal respiratory effort  CV:  RRR,  no murmur or rub, LE edema  GI:  Soft, NT, + BS, no HS megaly, PD exit site clean  Neurologic:  Non focal  Psych:             AAO x 3 appropriate affect   Skin:  No Rash      []    High complexity decision making was performed  []    Patient is at high-risk of decompensation with multiple organ involvement    Lab Data Personally Reviewed: I have reviewed all the pertinent labs, microbiology data and radiology studies during assessment.    Labs:  Recent Labs     02/03/24 0311 02/04/24  0533 02/05/24  0405    140 140   K 4.8 5.6* 5.2*    104 104   CO2 29 31 29   GLUCOSE 129* 82 118*   BUN 14 14 13   CREATININE 4.86* 4.97* 4.94*   CALCIUM 7.5* 6.8* 6.9*         Recent Labs     02/03/24  0311 02/04/24  0533 02/04/24  1424 02/05/24  0405   WBC 5.8 6.4  --  6.4   RBC 2.84* 3.12*  --  3.37*   HGB 7.9* 8.6* 9.5* 9.3*   HCT 25.7* 28.3* 31.0* 31.0*   MCV 90.5 90.7  --  92.0   MCH 27.8 27.6  --  27.6   MCHC 30.7 30.4  --  30.0   RDW 16.3* 16.5*  --  16.8*    169  --  183   MPV 10.0 9.7  --  10.4       Recent Labs     02/04/24  0533 02/05/24  0405   GLOB 4.2* 5.3*       No results for input(s): \"INR\", \"APTT\" in the last 72 hours.    Invalid input(s): \"PTP\"   No results for input(s): \"CPK\", \"CKMB\", \"TROPONINI\" in the last 72 hours.    Invalid input(s): \"B-NP\"  Invalid input(s): \"PHI\", \"PCO2I\", \"PO2I\", \"FIO2I\"     Ventilator:       Microbiology:  No results

## 2024-02-05 NOTE — DISCHARGE SUMMARY
Discharge Summary       PATIENT ID: Galilea Aguilar  MRN: [id number]   YOB: 1955    DATE OF ADMISSION: 1/25/2024 10:35 PM    DATE OF DISCHARGE: 2/5/24   PRIMARY CARE PROVIDER: Chapincito Oakes MD     ATTENDING PHYSICIAN: Emmanuel Anders MD  DISCHARGING PROVIDER: Emmanuel Anders MD    To contact this individual call [phone] and ask the  to page.  If unavailable ask to be transferred the Adult Hospitalist Department.    CONSULTATIONS: IP CONSULT TO NEPHROLOGY  IP CONSULT TO HOSPITALIST  IP CONSULT TO DIETITIAN  PHARMACY TO DOSE VANCOMYCIN  IP CONSULT TO CASE MANAGEMENT  IP CONSULT TO GI  IP CONSULT TO DIETITIAN    PROCEDURES/SURGERIES: * No surgery found *    ADMITTING DIAGNOSES & HOSPITAL COURSE:     Per H&P:    Galilea Aguilar is a 68 y.o. female who presented to the ED with generalized abdominal pain, intermittent chest pain X 1 week and fatigue.  Additionally reports vomiting and diarrhea X few days.  Denies fevers.  Denies cough or congestion.  Hypotension with PD this week and hypotensive upon arrival in the ED.     At the bedside patient is alert cooperative and interactive with assessment.  No residual deficits from recent stroke in December.  Abdominal pain is still generalized but endorses worse in the flank areas, ongoing nausea.     PMH of CABG in 9/2023, ESRD on peritoneal dialysis, multiple recurrent small CVA 12/2023, HTN, suspected a-fib on eliquis, IDDM, HLD,     Severe Sepsis 2/2 Peritonitis  - evidenced by peritoneal infection, hypotension, elevated lactate, increased respiratory rate on admission  -Fluid resuscitation started in the ED however less than 30 mL/kg secondary to history of ESRD PD  - Lactic acidosis has resolved  -Sepsis reassessment was completed in the ED around 2:45 AM, blood pressure was more stable at 126/60, hemodynamically stable  - c/w Cefepime/Vanc  - c/w NS w/ 40 meq K     ESRD-PD   - per nephrology  - resume PD tonight   - daily  tablet by mouth daily     insulin glargine 100 UNIT/ML injection vial  Commonly known as: LANTUS  Inject 5 Units into the skin nightly     ipratropium 0.5 mg-albuterol 2.5 mg 0.5-2.5 (3) MG/3ML Soln nebulizer solution  Commonly known as: DUONEB     Lancets 30G Misc  Test BG 4 times a day before meals and at bedtime. Dispense sufficient amount for indicated testing frequency plus additional to accommodate PRN testing needs.    Pharmacist to identify preferred brand.     magnesium oxide 400 (240 Mg) MG tablet  Commonly known as: MAG-OX  Take 1 tablet by mouth 2 times daily     melatonin 3 MG Tabs tablet  Take 2 tablets by mouth nightly as needed (insomnia)     methocarbamol 500 MG tablet  Commonly known as: ROBAXIN     midodrine 5 MG tablet  Commonly known as: PROAMATINE  Take 1 tablet by mouth 3 times daily as needed (spb <110, hold for sbp >120)     montelukast 10 MG tablet  Commonly known as: SINGULAIR     pantoprazole 40 MG tablet  Commonly known as: PROTONIX  Take 1 tablet by mouth 2 times daily (before meals)     rosuvastatin 40 MG tablet  Commonly known as: CRESTOR     Trulicity 0.75 MG/0.5ML Sopn  Generic drug: Dulaglutide            STOP taking these medications      furosemide 80 MG tablet  Commonly known as: LASIX     potassium chloride 20 MEQ packet  Commonly known as: KLOR-CON               Where to Get Your Medications        These medications were sent to [facility] [place], [place] - [address] [facility] [place] - P [phone] - F [phone]  [address] [facility] [address]      Phone: [phone]   fluconazole 100 MG tablet  oxyCODONE 5 MG immediate release tablet           NOTIFY YOUR PHYSICIAN FOR ANY OF THE FOLLOWING:   Fever over 101 degrees for 24 hours.   Chest pain, shortness of breath, fever, chills, nausea, vomiting, diarrhea, change in mentation, falling, weakness, bleeding. Severe pain or pain not relieved by medications.  Or, any other signs or symptoms that you may have

## 2024-02-05 NOTE — PLAN OF CARE
Problem: Nutrition Deficit:  Goal: Optimize nutritional status  Outcome: Progressing     Problem: Safety - Adult  Goal: Free from fall injury  Outcome: Progressing     Problem: Discharge Planning  Goal: Discharge to home or other facility with appropriate resources  Outcome: Progressing  Flowsheets (Taken 2/5/2024 0971)  Discharge to home or other facility with appropriate resources: Identify barriers to discharge with patient and caregiver     Problem: Pain  Goal: Verbalizes/displays adequate comfort level or baseline comfort level  Outcome: Progressing     Problem: Skin/Tissue Integrity  Goal: Absence of new skin breakdown  Description: 1.  Monitor for areas of redness and/or skin breakdown  2.  Assess vascular access sites hourly  3.  Every 4-6 hours minimum:  Change oxygen saturation probe site  4.  Every 4-6 hours:  If on nasal continuous positive airway pressure, respiratory therapy assess nares and determine need for appliance change or resting period.  Outcome: Progressing     Problem: ABCDS Injury Assessment  Goal: Absence of physical injury  Outcome: Progressing     Problem: Physical Therapy - Adult  Goal: By Discharge: Performs mobility at highest level of function for planned discharge setting.  See evaluation for individualized goals.  Description: FUNCTIONAL STATUS PRIOR TO ADMISSION: Patient was modified independent using a rolling walker within her home and a wheelchair in the community for functional mobility.  Pt endorses having several falls in the past year.  The last fall occurred in early January 2024.     HOME SUPPORT PRIOR TO ADMISSION: The patient lived with her son and required occasional assistance for bathing and dressing.  Pt reports her family would standby when ambulating due to her fall risk.    Physical Therapy Goals  Initiated 1/27/2024  1.  Patient will move from supine to sit and sit to supine in bed with independence within 7 day(s).    2.  Patient will perform sit to stand  with supervision within 7 day(s).  3.  Patient will transfer from bed to chair and chair to bed with supervision using the least restrictive device within 7 day(s).  4.  Patient will ambulate with supervision for 100 feet with the least restrictive device within 7 day(s).   5.  Patient will ascend/descend 4 stairs with 1 handrail(s) with contact guard assist within 7 day(s).   2/5/2024 1152 by Marlen Solano, PT  Outcome: Progressing     Problem: Chronic Conditions and Co-morbidities  Goal: Patient's chronic conditions and co-morbidity symptoms are monitored and maintained or improved  Outcome: Progressing  Flowsheets (Taken 2/5/2024 8235)  Care Plan - Patient's Chronic Conditions and Co-Morbidity Symptoms are Monitored and Maintained or Improved: Monitor and assess patient's chronic conditions and comorbid symptoms for stability, deterioration, or improvement

## 2024-02-05 NOTE — FLOWSHEET NOTE
PD CONNECTION    02/04/24 2130   Vitals   Temp 98.6 °F (37 °C)   Temp Source Oral   Pulse 94   Respirations 16   Observations & Evaluations   Level of Consciousness 0   Heart Rhythm Regular   Respiratory Quality/Effort Unlabored   O2 Device None (Room air)   Skin Condition/Temp Warm;Dry   Abdomen Inspection Soft   Peritoneal Dialysis Catheter Mid lower abdomen   No placement date or time found.   Catheter Location: Mid lower abdomen   Status Accessed   Site Condition Clean, dry, intact   Dressing Status New dressing applied   Dressing Gauze  (with gentamicin cream)   Date of Last Dressing Change 02/04/24   Dialysis Type Continuous cycling   Exit Site Condition Good   Catheter Care Given Yes   Cycler   Verification of Prescription CCPD   Informed Consent  Yes   Total Volume Programmed 06537 mL   Therapy Time (Hours:Minutes) 9:30   Cycler Type [facility]   Fill Volume 2300 mL   Last Fill Volume 1000 mL   Dextrose Setting Same (Nonextraneal)   I Drain Alarm 750 mL   Number of Cycles 5   Bag Volume 6000 mL   Number of Bags Used 3   Dianeal Solution   (2.5%)     Time Out (time): 2115  Primary RN SBAR: BRONWYN Camejo RN   Patient Education: procedural   Comments:  orders, labs, consent (chronic consent conveys) and code status reviewed.  CCPD treatment initiated as ordered.    Hepatitis B Surface Ag   Date/Time Value Ref Range Status   01/28/2024 07:09 AM <0.10 Index Final     Hep B S Ab   Date/Time Value Ref Range Status   01/31/2024 06:50 AM <3.10 mIU/mL Final     Time on:  2130  Time off:  0700

## 2024-02-05 NOTE — PROGRESS NOTES
Physician Progress Note      PATIENT:               LULY WIGGINS  CSN #:                  [id number]  :                       1955  ADMIT DATE:       2024 10:35 PM  DISCH DATE:  RESPONDING  PROVIDER #:        Emmanuel Anders MD          QUERY TEXT:    Pt was admitted with PD peritonitis. Please clarify in progress notes and   discharge summary the relationship, between the peritonitis and the peritoneal   dialysis.    The medical record reflects the following:    Risk Factors: 68-year-old female with ESRD on peritoneal dialysis was admitted   with PD peritonitis  Clinical Indicators: Hospitalist documented: \"PD peritonitis - peritoneal   fluid sent for cell count and culture- heavy staph epi\"  Treatment: Vancomycin & Diflucan, PD fluid culture    Thank-you,  Dominic Vogt RN, CCDS, CRCR  Clinical   [email or web address]  [phone]  You can also contact me via [employer].  Options provided:  -- Peritonitis due to peritoneal dialysis catheter  -- Peritonitis due to peritoneal dialysis fluid  -- Other - I will add my own diagnosis  -- Disagree - Not applicable / Not valid  -- Disagree - Clinically unable to determine / Unknown  -- Refer to Clinical Documentation Reviewer    PROVIDER RESPONSE TEXT:    Provider is clinically unable to determine a response to this query.    Query created by: Dominic Vogt on 2024 1:05 PM      Electronically signed by:  Emmanuel Anders MD 2024 1:26 PM

## 2024-02-06 NOTE — PROGRESS NOTES
Physician Progress Note      PATIENT:               LULY WIGGINS  [facility] #:                  [id number]  :                       1955  ADMIT DATE:       2024 10:35 PM  DISCH DATE:        2024 5:00 PM  RESPONDING  PROVIDER #:        Emmanuel Anders MD          QUERY TEXT:    Patient admitted with PD peritonitis. The Hospitalist noted severe sepsis on   admission, but during the first 24 hours of admission, the pt was noted only   to have WBC of 9.7 and temp of 97.9-98.2. The ED Physician noted that the pt   technically did not meet SIRS criteria, but still had suspicion of sepsis.   Please document additional clinical indicators to support sepsis or document   if sepsis was ruled out after study.    The medical record reflects the following:    Risk Factors: 68-year-old female with ESRD on peritoneal dialysis was admitted   with PD peritonitis  Clinical Indicators:  -- First 24 hours of admission: WBC = 9.7, temp = 97.9-98.2, BP to 82/47, HR =   77-95, RR = 12-26, lactic = 4.2  -- On  (day 3-4 of admission): WBC = 13.0, temp = 98.4-99, HR = 88-96, RR   = 16-18, BP to 89/51  -- Hospitalist documented: \"Severe Sepsis 2/2 Peritonitis - evidenced by   peritoneal infection, hypotension, elevated lactate, increased respiratory   rate on admission\"  -- ED Physician documented: \"68 y.o. female here today with concerns for   sepsis. Has abd pain and hypotension here with lactic acidosis and leftward   shift (although no leukocytosis). Tachypnea but technically doesn't meet SIRS   criteria, although I still have suspicion for sepsis.\"  Treatment: Vancomycin & Diflucan, PD fluid culture, serial lab & vitals   monitoring    Thank-you,  Dominic Vogt RN, CCDS, CRCR  Clinical   [email or web address]  [phone]  You can also contact me via [employer].  Options provided:  -- Sepsis present on admission as evidenced by, please document additional   evidence, Please document  additional evidence.  -- Sepsis was ruled out after study  -- Other - I will add my own diagnosis  -- Disagree - Not applicable / Not valid  -- Disagree - Clinically unable to determine / Unknown  -- Refer to Clinical Documentation Reviewer    PROVIDER RESPONSE TEXT:    Sepsis was ruled out after study.    Query created by: Dominic Vogt on 2/6/2024 8:22 AM      Electronically signed by:  Emmanuel Anders MD 2/6/2024 3:40 PM

## 2024-02-07 ENCOUNTER — HOME CARE VISIT (OUTPATIENT)
Facility: HOME HEALTH | Age: 69
End: 2024-02-07
Payer: MEDICARE

## 2024-02-07 VITALS
DIASTOLIC BLOOD PRESSURE: 70 MMHG | OXYGEN SATURATION: 100 % | SYSTOLIC BLOOD PRESSURE: 102 MMHG | HEART RATE: 80 BPM | RESPIRATION RATE: 18 BRPM | TEMPERATURE: 97.3 F

## 2024-02-07 PROCEDURE — G0299 HHS/HOSPICE OF RN EA 15 MIN: HCPCS

## 2024-02-08 ENCOUNTER — HOME CARE VISIT (OUTPATIENT)
Dept: HOME HEALTH SERVICES | Facility: HOME HEALTH | Age: 69
End: 2024-02-08
Payer: MEDICARE

## 2024-02-09 ENCOUNTER — HOME CARE VISIT (OUTPATIENT)
Facility: HOME HEALTH | Age: 69
End: 2024-02-09
Payer: MEDICARE

## 2024-02-09 VITALS
OXYGEN SATURATION: 100 % | RESPIRATION RATE: 18 BRPM | SYSTOLIC BLOOD PRESSURE: 102 MMHG | DIASTOLIC BLOOD PRESSURE: 68 MMHG | TEMPERATURE: 97.6 F | HEART RATE: 88 BPM

## 2024-02-09 VITALS
DIASTOLIC BLOOD PRESSURE: 68 MMHG | RESPIRATION RATE: 18 BRPM | SYSTOLIC BLOOD PRESSURE: 102 MMHG | OXYGEN SATURATION: 100 % | HEART RATE: 88 BPM | TEMPERATURE: 97.6 F

## 2024-02-09 PROCEDURE — G0299 HHS/HOSPICE OF RN EA 15 MIN: HCPCS

## 2024-02-09 PROCEDURE — G0151 HHCP-SERV OF PT,EA 15 MIN: HCPCS

## 2024-02-10 NOTE — HOME HEALTH
Subjective: Pt states no pain or discomfort from toe nail coming off.  Falls since last visit No(if yes complete the Fall Tracking Form and include bsrifallreport):   Caregiver involvement changes: na  Home health supplies by type and quantity ordered/delivered this visit include: na    Clinician asked if patient has had any physician contact since last home care visit and patient states: NO  Clinician asked if patient has any new or changed medications and patient states:  N/A   If Yes, were medications reconciled? N/A   Was the certifying physician notified of changes in medications? N/A     Clinical assessment (what this visit means for the patient overall and need for ongoing skilled care) and progress or lack of progress towards SPECIFIC goals: SN performed another medication reconciliation, CG was able to locate some medications that arrived via mail. Some medications still not in home, limited education performed due to time taken for medication reconciliation.     Written Teaching Material Utilized: N/A    Interdisciplinary communication with: N/A for the purpose of na    Discharge planning as follows: When goals are met    Specific plan for next visit: DM education

## 2024-02-15 ENCOUNTER — HOME CARE VISIT (OUTPATIENT)
Facility: HOME HEALTH | Age: 69
End: 2024-02-15
Payer: MEDICARE

## 2024-02-15 VITALS
DIASTOLIC BLOOD PRESSURE: 80 MMHG | BODY MASS INDEX: 24.15 KG/M2 | HEART RATE: 92 BPM | TEMPERATURE: 97.6 F | WEIGHT: 158.8 LBS | SYSTOLIC BLOOD PRESSURE: 140 MMHG | RESPIRATION RATE: 20 BRPM | OXYGEN SATURATION: 99 %

## 2024-02-15 PROCEDURE — G0151 HHCP-SERV OF PT,EA 15 MIN: HCPCS

## 2024-02-15 NOTE — PROGRESS NOTES
Physician Progress Note      PATIENT:               LULY WIGGINS  CSN #:                  [id number]  :                       1955  ADMIT DATE:       2024 10:35 PM  DISCH DATE:        2024 5:00 PM  RESPONDING  PROVIDER #:        Emmanuel Anders MD          QUERY TEXT:    Patient was admitted with PD peritonitis. The Registered Dietician documented   moderate malnutrition. Nephrology documented hypokalemia, hypophosphatemia and   hypomagnesemia secondary to severe malnutrition. The patient received   nutritional supplements with all meals. Please document in progress notes and   discharge summary if any of the following were being evaluated and/or treated:    The medical record reflects the following:  Risk Factors: 69 yo female with PMH of ESRD on PD, COPD, DM, CVA, HTN and GERD   presented with n/v/d x several days and was admitted for PD peritonitis  Clinical Indicators:  -- Registered Dietician Malnutrition Assessment:  \"Malnutrition Status:  Moderate malnutrition (24 1452)  Context:  Chronic Illness  Findings of the 6 clinical characteristics of malnutrition:  Energy Intake:  Mild decrease in energy intake (Comment)  Weight Loss:  Greater than 7.5% over 3 months  Body Fat Loss:  Mild body fat loss Triceps  Muscle Mass Loss:  Unable to assess  Fluid Accumulation:  No significant fluid accumulation   Strength:  Not Performed\"  -- Nephrology documented: \"Hypokalemia - Hypophosphatemia - Hypomagnesemia -   2/2 severe malnutrition\"  Treatment: Registered Dietician consult, nutritional supplements with all   meals, weight measurement, I/O, regular 4 carb choices diet    Thank-you,  Dominic Vogt RN, CCDS, CRCR  Clinical   [email or web address]  [phone]  You can also contact me via [employer].  Options provided:  -- Severe protein calorie malnutrition  -- Moderate protein calorie malnutrition only, severe protein calorie   malnutrition ruled out after

## 2024-02-15 NOTE — HOME HEALTH
Subjective: \"I'm nauseous and just don't feel good. I've felt this way for the past couple days.\"  Falls since last visit No(if yes complete the Fall Tracking Form and include bsrifallreport):   Caregiver involvement changes: no change  Home health supplies by type and quantity ordered/delivered this visit include: n/a    Clinician asked if patient has had any physician contact since last home care visit and patient states: NO  Clinician asked if patient has any new or changed medications and patient states:  NO    If Yes, were medications reconciled? N/A    Was the certifying physician notified of changes in medications? N/A      Clinical assessment (what this visit means for the patient overall and need for ongoing skilled care) and progress or lack of progress towards SPECIFIC goals: Patient had decreased overall endurance today due to not feeling well.  She was encouraged to try and eat at least 2 meals a day, due to her reporting minimal appetite and loss in weight.  Followup needed for progression of transfer training    Written Teaching Material Utilized: written HEP progressed    Interdisciplinary communication with: COPE nurse that arrived to home at end of visitfor the purpose of POC collaboration and concerns of nausea and loss of appetitie    Discharge planning as follows: Will discharge when the patient has reached their maximum functional potential and maximum safety in their home    Specific plan for next visit: Re-instruct patient/caregiver on home exercises and progress transfer training to kitchen chair.

## 2024-02-16 ENCOUNTER — HOME CARE VISIT (OUTPATIENT)
Dept: HOME HEALTH SERVICES | Facility: HOME HEALTH | Age: 69
End: 2024-02-16
Payer: MEDICARE

## 2024-02-21 ENCOUNTER — HOSPITAL ENCOUNTER (INPATIENT)
Facility: HOSPITAL | Age: 69
LOS: 23 days | Discharge: HOME | End: 2024-03-16
Attending: EMERGENCY MEDICINE | Admitting: FAMILY MEDICINE
Payer: MEDICARE

## 2024-02-21 ENCOUNTER — HOME CARE VISIT (OUTPATIENT)
Facility: HOME HEALTH | Age: 69
End: 2024-02-21
Payer: MEDICARE

## 2024-02-21 ENCOUNTER — APPOINTMENT (OUTPATIENT)
Facility: HOSPITAL | Age: 69
End: 2024-02-21
Payer: MEDICARE

## 2024-02-21 VITALS
SYSTOLIC BLOOD PRESSURE: 122 MMHG | DIASTOLIC BLOOD PRESSURE: 70 MMHG | HEART RATE: 83 BPM | TEMPERATURE: 97.5 F | RESPIRATION RATE: 18 BRPM | OXYGEN SATURATION: 99 %

## 2024-02-21 DIAGNOSIS — R11.2 INTRACTABLE NAUSEA AND VOMITING: Primary | ICD-10-CM

## 2024-02-21 DIAGNOSIS — E83.42 HYPOMAGNESEMIA: ICD-10-CM

## 2024-02-21 DIAGNOSIS — R94.31 PROLONGED Q-T INTERVAL ON ECG: ICD-10-CM

## 2024-02-21 LAB
ALBUMIN SERPL-MCNC: 1.6 G/DL (ref 3.5–5)
ALBUMIN/GLOB SERPL: 0.3 (ref 1.1–2.2)
ALP SERPL-CCNC: 154 U/L (ref 45–117)
ALT SERPL-CCNC: 13 U/L (ref 12–78)
ANION GAP SERPL CALC-SCNC: 7 MMOL/L (ref 5–15)
AST SERPL-CCNC: 26 U/L (ref 15–37)
BASOPHILS # BLD: 0.1 K/UL (ref 0–0.1)
BASOPHILS NFR BLD: 1 % (ref 0–1)
BILIRUB SERPL-MCNC: 0.6 MG/DL (ref 0.2–1)
BUN SERPL-MCNC: 10 MG/DL (ref 6–20)
BUN/CREAT SERPL: 2 (ref 12–20)
CALCIUM SERPL-MCNC: 7.1 MG/DL (ref 8.5–10.1)
CHLORIDE SERPL-SCNC: 95 MMOL/L (ref 97–108)
CO2 SERPL-SCNC: 29 MMOL/L (ref 21–32)
COMMENT:: NORMAL
CREAT SERPL-MCNC: 5.43 MG/DL (ref 0.55–1.02)
DIFFERENTIAL METHOD BLD: ABNORMAL
EOSINOPHIL # BLD: 0.2 K/UL (ref 0–0.4)
EOSINOPHIL NFR BLD: 3 % (ref 0–7)
ERYTHROCYTE [DISTWIDTH] IN BLOOD BY AUTOMATED COUNT: 18.2 % (ref 11.5–14.5)
GLOBULIN SER CALC-MCNC: 5.8 G/DL (ref 2–4)
GLUCOSE SERPL-MCNC: 141 MG/DL (ref 65–100)
HCT VFR BLD AUTO: 34.2 % (ref 35–47)
HGB BLD-MCNC: 11.1 G/DL (ref 11.5–16)
IMM GRANULOCYTES # BLD AUTO: 0 K/UL (ref 0–0.04)
IMM GRANULOCYTES NFR BLD AUTO: 0 % (ref 0–0.5)
LIPASE SERPL-CCNC: 43 U/L (ref 13–75)
LYMPHOCYTES # BLD: 1.5 K/UL (ref 0.8–3.5)
LYMPHOCYTES NFR BLD: 20 % (ref 12–49)
MAGNESIUM SERPL-MCNC: 1.5 MG/DL (ref 1.6–2.4)
MCH RBC QN AUTO: 28.9 PG (ref 26–34)
MCHC RBC AUTO-ENTMCNC: 32.5 G/DL (ref 30–36.5)
MCV RBC AUTO: 89.1 FL (ref 80–99)
MONOCYTES # BLD: 0.6 K/UL (ref 0–1)
MONOCYTES NFR BLD: 8 % (ref 5–13)
NEUTS SEG # BLD: 5.1 K/UL (ref 1.8–8)
NEUTS SEG NFR BLD: 68 % (ref 32–75)
NRBC # BLD: 0 K/UL (ref 0–0.01)
NRBC BLD-RTO: 0 PER 100 WBC
PLATELET # BLD AUTO: 248 K/UL (ref 150–400)
PMV BLD AUTO: 10.4 FL (ref 8.9–12.9)
POTASSIUM SERPL-SCNC: 3 MMOL/L (ref 3.5–5.1)
PROT SERPL-MCNC: 7.4 G/DL (ref 6.4–8.2)
RBC # BLD AUTO: 3.84 M/UL (ref 3.8–5.2)
SODIUM SERPL-SCNC: 131 MMOL/L (ref 136–145)
SPECIMEN HOLD: NORMAL
TROPONIN I SERPL HS-MCNC: 10 NG/L (ref 0–51)
WBC # BLD AUTO: 7.5 K/UL (ref 3.6–11)

## 2024-02-21 PROCEDURE — 96375 TX/PRO/DX INJ NEW DRUG ADDON: CPT

## 2024-02-21 PROCEDURE — 99285 EMERGENCY DEPT VISIT HI MDM: CPT

## 2024-02-21 PROCEDURE — 84100 ASSAY OF PHOSPHORUS: CPT

## 2024-02-21 PROCEDURE — 85025 COMPLETE CBC W/AUTO DIFF WBC: CPT

## 2024-02-21 PROCEDURE — G0299 HHS/HOSPICE OF RN EA 15 MIN: HCPCS

## 2024-02-21 PROCEDURE — 36415 COLL VENOUS BLD VENIPUNCTURE: CPT

## 2024-02-21 PROCEDURE — 80053 COMPREHEN METABOLIC PANEL: CPT

## 2024-02-21 PROCEDURE — 71046 X-RAY EXAM CHEST 2 VIEWS: CPT

## 2024-02-21 PROCEDURE — 84484 ASSAY OF TROPONIN QUANT: CPT

## 2024-02-21 PROCEDURE — 74176 CT ABD & PELVIS W/O CONTRAST: CPT

## 2024-02-21 PROCEDURE — 83735 ASSAY OF MAGNESIUM: CPT

## 2024-02-21 PROCEDURE — 6360000002 HC RX W HCPCS: Performed by: EMERGENCY MEDICINE

## 2024-02-21 PROCEDURE — 83690 ASSAY OF LIPASE: CPT

## 2024-02-21 RX ORDER — MORPHINE SULFATE 4 MG/ML
4 INJECTION, SOLUTION INTRAMUSCULAR; INTRAVENOUS
Status: COMPLETED | OUTPATIENT
Start: 2024-02-21 | End: 2024-02-21

## 2024-02-21 RX ADMIN — MORPHINE SULFATE 4 MG: 4 INJECTION, SOLUTION INTRAMUSCULAR; INTRAVENOUS at 23:51

## 2024-02-21 ASSESSMENT — PAIN SCALES - GENERAL
PAINLEVEL_OUTOF10: 8
PAINLEVEL_OUTOF10: 9

## 2024-02-21 ASSESSMENT — ENCOUNTER SYMPTOMS
VOMITING: 1
NAUSEA: 1

## 2024-02-21 ASSESSMENT — PAIN DESCRIPTION - FREQUENCY: FREQUENCY: CONTINUOUS

## 2024-02-21 ASSESSMENT — PAIN DESCRIPTION - PAIN TYPE: TYPE: ACUTE PAIN

## 2024-02-21 ASSESSMENT — PAIN DESCRIPTION - LOCATION
LOCATION: ABDOMEN
LOCATION: ABDOMEN

## 2024-02-21 ASSESSMENT — PAIN DESCRIPTION - DESCRIPTORS
DESCRIPTORS: ACHING
DESCRIPTORS: ACHING

## 2024-02-21 ASSESSMENT — PAIN DESCRIPTION - ORIENTATION
ORIENTATION: LEFT
ORIENTATION: UPPER

## 2024-02-21 ASSESSMENT — PAIN DESCRIPTION - DIRECTION: RADIATING_TOWARDS: LEFT

## 2024-02-21 ASSESSMENT — PAIN DESCRIPTION - ONSET: ONSET: ON-GOING

## 2024-02-21 ASSESSMENT — PAIN - FUNCTIONAL ASSESSMENT
PAIN_FUNCTIONAL_ASSESSMENT: ACTIVITIES ARE NOT PREVENTED
PAIN_FUNCTIONAL_ASSESSMENT: PREVENTS OR INTERFERES SOME ACTIVE ACTIVITIES AND ADLS

## 2024-02-22 PROBLEM — R11.10 VOMITING: Status: ACTIVE | Noted: 2024-02-22

## 2024-02-22 LAB
APPEARANCE FLD: CLEAR
COLOR FLD: COLORLESS
COMMENT:: NORMAL
DATE LAST DOSE: ABNORMAL
DOSE AMOUNT: ABNORMAL UNITS
DOSE DATE/TIME: ABNORMAL
GLUCOSE BLD STRIP.AUTO-MCNC: 105 MG/DL (ref 65–117)
GLUCOSE BLD STRIP.AUTO-MCNC: 144 MG/DL (ref 65–117)
GLUCOSE BLD STRIP.AUTO-MCNC: 84 MG/DL (ref 65–117)
LYMPHOCYTES NFR FLD: 17 %
MESOTHL CELL NFR FLD: 1 %
MONOS+MACROS NFR FLD: 70 %
NEUTROPHILS NFR FLD: 12 %
NUC CELL # FLD: 69 /CU MM
PHOSPHATE SERPL-MCNC: 2.3 MG/DL (ref 2.6–4.7)
RBC # FLD: 6 /CU MM
SERVICE CMNT-IMP: ABNORMAL
SERVICE CMNT-IMP: NORMAL
SERVICE CMNT-IMP: NORMAL
SPECIMEN HOLD: NORMAL
SPECIMEN SOURCE FLD: ABNORMAL
VANCOMYCIN TROUGH SERPL-MCNC: 23.1 UG/ML (ref 5–10)

## 2024-02-22 PROCEDURE — 96375 TX/PRO/DX INJ NEW DRUG ADDON: CPT

## 2024-02-22 PROCEDURE — 6360000002 HC RX W HCPCS: Performed by: EMERGENCY MEDICINE

## 2024-02-22 PROCEDURE — 2580000003 HC RX 258: Performed by: EMERGENCY MEDICINE

## 2024-02-22 PROCEDURE — 36415 COLL VENOUS BLD VENIPUNCTURE: CPT

## 2024-02-22 PROCEDURE — 87070 CULTURE OTHR SPECIMN AEROBIC: CPT

## 2024-02-22 PROCEDURE — 90945 DIALYSIS ONE EVALUATION: CPT

## 2024-02-22 PROCEDURE — 2060000000 HC ICU INTERMEDIATE R&B

## 2024-02-22 PROCEDURE — 6370000000 HC RX 637 (ALT 250 FOR IP): Performed by: NURSE PRACTITIONER

## 2024-02-22 PROCEDURE — 87205 SMEAR GRAM STAIN: CPT

## 2024-02-22 PROCEDURE — 96361 HYDRATE IV INFUSION ADD-ON: CPT

## 2024-02-22 PROCEDURE — 96365 THER/PROPH/DIAG IV INF INIT: CPT

## 2024-02-22 PROCEDURE — 80202 ASSAY OF VANCOMYCIN: CPT

## 2024-02-22 PROCEDURE — 2580000003 HC RX 258: Performed by: NURSE PRACTITIONER

## 2024-02-22 PROCEDURE — 89050 BODY FLUID CELL COUNT: CPT

## 2024-02-22 PROCEDURE — 6370000000 HC RX 637 (ALT 250 FOR IP)

## 2024-02-22 PROCEDURE — 6360000002 HC RX W HCPCS: Performed by: NURSE PRACTITIONER

## 2024-02-22 PROCEDURE — 82306 VITAMIN D 25 HYDROXY: CPT

## 2024-02-22 PROCEDURE — 3E1M39Z IRRIGATION OF PERITONEAL CAVITY USING DIALYSATE, PERCUTANEOUS APPROACH: ICD-10-PCS | Performed by: NURSE PRACTITIONER

## 2024-02-22 PROCEDURE — 96366 THER/PROPH/DIAG IV INF ADDON: CPT

## 2024-02-22 PROCEDURE — 82962 GLUCOSE BLOOD TEST: CPT

## 2024-02-22 RX ORDER — GENTAMICIN SULFATE 1 MG/G
CREAM TOPICAL DAILY
Status: DISCONTINUED | OUTPATIENT
Start: 2024-02-22 | End: 2024-02-22

## 2024-02-22 RX ORDER — HEPARIN SODIUM 5000 [USP'U]/ML
5000 INJECTION, SOLUTION INTRAVENOUS; SUBCUTANEOUS EVERY 8 HOURS SCHEDULED
Status: DISCONTINUED | OUTPATIENT
Start: 2024-02-22 | End: 2024-02-22

## 2024-02-22 RX ORDER — SODIUM CHLORIDE 0.9 % (FLUSH) 0.9 %
5-40 SYRINGE (ML) INJECTION EVERY 12 HOURS SCHEDULED
Status: DISCONTINUED | OUTPATIENT
Start: 2024-02-22 | End: 2024-03-16 | Stop reason: HOSPADM

## 2024-02-22 RX ORDER — SODIUM CHLORIDE, SODIUM LACTATE, CALCIUM CHLORIDE, MAGNESIUM CHLORIDE AND DEXTROSE 1.5; 538; 448; 18.3; 5.08 G/100ML; MG/100ML; MG/100ML; MG/100ML; MG/100ML
2500 INJECTION, SOLUTION INTRAPERITONEAL CONTINUOUS
Status: DISCONTINUED | OUTPATIENT
Start: 2024-02-22 | End: 2024-02-22

## 2024-02-22 RX ORDER — POLYETHYLENE GLYCOL 3350 17 G/17G
17 POWDER, FOR SOLUTION ORAL DAILY PRN
Status: DISCONTINUED | OUTPATIENT
Start: 2024-02-22 | End: 2024-03-16 | Stop reason: HOSPADM

## 2024-02-22 RX ORDER — INSULIN LISPRO 100 [IU]/ML
0-4 INJECTION, SOLUTION INTRAVENOUS; SUBCUTANEOUS NIGHTLY
Status: DISCONTINUED | OUTPATIENT
Start: 2024-02-22 | End: 2024-03-16 | Stop reason: HOSPADM

## 2024-02-22 RX ORDER — 0.9 % SODIUM CHLORIDE 0.9 %
1000 INTRAVENOUS SOLUTION INTRAVENOUS ONCE
Status: COMPLETED | OUTPATIENT
Start: 2024-02-22 | End: 2024-02-22

## 2024-02-22 RX ORDER — DEXTROSE MONOHYDRATE 100 MG/ML
INJECTION, SOLUTION INTRAVENOUS CONTINUOUS PRN
Status: DISCONTINUED | OUTPATIENT
Start: 2024-02-22 | End: 2024-03-16 | Stop reason: HOSPADM

## 2024-02-22 RX ORDER — SODIUM CHLORIDE, SODIUM LACTATE, CALCIUM CHLORIDE, MAGNESIUM CHLORIDE AND DEXTROSE 1.5; 538; 448; 18.3; 5.08 G/100ML; MG/100ML; MG/100ML; MG/100ML; MG/100ML
6000 INJECTION, SOLUTION INTRAPERITONEAL DAILY
Status: DISCONTINUED | OUTPATIENT
Start: 2024-02-22 | End: 2024-03-05

## 2024-02-22 RX ORDER — SODIUM CHLORIDE, SODIUM LACTATE, CALCIUM CHLORIDE, MAGNESIUM CHLORIDE AND DEXTROSE 1.5; 538; 448; 18.3; 5.08 G/100ML; MG/100ML; MG/100ML; MG/100ML; MG/100ML
2300 INJECTION, SOLUTION INTRAPERITONEAL CONTINUOUS
Status: DISCONTINUED | OUTPATIENT
Start: 2024-02-22 | End: 2024-02-22

## 2024-02-22 RX ORDER — ACETAMINOPHEN 650 MG/1
650 SUPPOSITORY RECTAL EVERY 6 HOURS PRN
Status: DISCONTINUED | OUTPATIENT
Start: 2024-02-22 | End: 2024-03-16 | Stop reason: HOSPADM

## 2024-02-22 RX ORDER — SODIUM CHLORIDE 9 MG/ML
INJECTION, SOLUTION INTRAVENOUS PRN
Status: DISCONTINUED | OUTPATIENT
Start: 2024-02-22 | End: 2024-03-16 | Stop reason: HOSPADM

## 2024-02-22 RX ORDER — SODIUM CHLORIDE, SODIUM LACTATE, CALCIUM CHLORIDE, MAGNESIUM CHLORIDE AND DEXTROSE 1.5; 538; 448; 18.3; 5.08 G/100ML; MG/100ML; MG/100ML; MG/100ML; MG/100ML
6000 INJECTION, SOLUTION INTRAPERITONEAL DAILY
Status: DISCONTINUED | OUTPATIENT
Start: 2024-02-22 | End: 2024-03-04

## 2024-02-22 RX ORDER — INSULIN LISPRO 100 [IU]/ML
0-4 INJECTION, SOLUTION INTRAVENOUS; SUBCUTANEOUS
Status: DISCONTINUED | OUTPATIENT
Start: 2024-02-22 | End: 2024-03-16 | Stop reason: HOSPADM

## 2024-02-22 RX ORDER — ONDANSETRON 2 MG/ML
4 INJECTION INTRAMUSCULAR; INTRAVENOUS EVERY 6 HOURS PRN
Status: DISCONTINUED | OUTPATIENT
Start: 2024-02-22 | End: 2024-02-22

## 2024-02-22 RX ORDER — SODIUM CHLORIDE, SODIUM LACTATE, CALCIUM CHLORIDE, MAGNESIUM CHLORIDE AND DEXTROSE 1.5; 538; 448; 18.3; 5.08 G/100ML; MG/100ML; MG/100ML; MG/100ML; MG/100ML
6000 INJECTION, SOLUTION INTRAPERITONEAL DAILY
Status: DISCONTINUED | OUTPATIENT
Start: 2024-02-22 | End: 2024-03-01

## 2024-02-22 RX ORDER — DIAZEPAM 5 MG/ML
2.5 INJECTION, SOLUTION INTRAMUSCULAR; INTRAVENOUS ONCE
Status: COMPLETED | OUTPATIENT
Start: 2024-02-22 | End: 2024-02-22

## 2024-02-22 RX ORDER — ONDANSETRON 4 MG/1
4 TABLET, ORALLY DISINTEGRATING ORAL EVERY 8 HOURS PRN
Status: DISCONTINUED | OUTPATIENT
Start: 2024-02-22 | End: 2024-02-22

## 2024-02-22 RX ORDER — SODIUM CHLORIDE 0.9 % (FLUSH) 0.9 %
5-40 SYRINGE (ML) INJECTION PRN
Status: DISCONTINUED | OUTPATIENT
Start: 2024-02-22 | End: 2024-03-16 | Stop reason: HOSPADM

## 2024-02-22 RX ORDER — GENTAMICIN SULFATE 1 MG/G
CREAM TOPICAL PRN
Status: DISCONTINUED | OUTPATIENT
Start: 2024-02-22 | End: 2024-03-16 | Stop reason: HOSPADM

## 2024-02-22 RX ORDER — MAGNESIUM SULFATE 1 G/100ML
1000 INJECTION INTRAVENOUS ONCE
Status: COMPLETED | OUTPATIENT
Start: 2024-02-22 | End: 2024-02-22

## 2024-02-22 RX ORDER — PROCHLORPERAZINE EDISYLATE 5 MG/ML
5 INJECTION INTRAMUSCULAR; INTRAVENOUS EVERY 6 HOURS PRN
Status: DISCONTINUED | OUTPATIENT
Start: 2024-02-22 | End: 2024-03-16 | Stop reason: HOSPADM

## 2024-02-22 RX ORDER — SCOLOPAMINE TRANSDERMAL SYSTEM 1 MG/1
1 PATCH, EXTENDED RELEASE TRANSDERMAL
Status: DISCONTINUED | OUTPATIENT
Start: 2024-02-22 | End: 2024-03-01

## 2024-02-22 RX ORDER — MORPHINE SULFATE 2 MG/ML
1 INJECTION, SOLUTION INTRAMUSCULAR; INTRAVENOUS EVERY 4 HOURS PRN
Status: DISCONTINUED | OUTPATIENT
Start: 2024-02-22 | End: 2024-02-26

## 2024-02-22 RX ORDER — SODIUM CHLORIDE AND POTASSIUM CHLORIDE 150; 900 MG/100ML; MG/100ML
INJECTION, SOLUTION INTRAVENOUS CONTINUOUS
Status: DISCONTINUED | OUTPATIENT
Start: 2024-02-22 | End: 2024-02-23

## 2024-02-22 RX ORDER — ACETAMINOPHEN 325 MG/1
650 TABLET ORAL EVERY 6 HOURS PRN
Status: DISCONTINUED | OUTPATIENT
Start: 2024-02-22 | End: 2024-03-16 | Stop reason: HOSPADM

## 2024-02-22 RX ADMIN — POTASSIUM CHLORIDE AND SODIUM CHLORIDE: 900; 150 INJECTION, SOLUTION INTRAVENOUS at 09:09

## 2024-02-22 RX ADMIN — MORPHINE SULFATE 1 MG: 2 INJECTION, SOLUTION INTRAMUSCULAR; INTRAVENOUS at 15:50

## 2024-02-22 RX ADMIN — DIAZEPAM 2.5 MG: 5 INJECTION, SOLUTION INTRAMUSCULAR; INTRAVENOUS at 00:32

## 2024-02-22 RX ADMIN — SODIUM CHLORIDE 1000 ML: 9 INJECTION, SOLUTION INTRAVENOUS at 00:32

## 2024-02-22 RX ADMIN — SODIUM CHLORIDE, SODIUM LACTATE, CALCIUM CHLORIDE, MAGNESIUM CHLORIDE AND DEXTROSE 6000 ML: 1.5; 538; 448; 18.3; 5.08 INJECTION, SOLUTION INTRAPERITONEAL at 16:18

## 2024-02-22 RX ADMIN — MAGNESIUM SULFATE HEPTAHYDRATE 1000 MG: 1 INJECTION, SOLUTION INTRAVENOUS at 03:51

## 2024-02-22 RX ADMIN — PROCHLORPERAZINE EDISYLATE 5 MG: 5 INJECTION INTRAMUSCULAR; INTRAVENOUS at 15:50

## 2024-02-22 RX ADMIN — GENTAMICIN SULFATE: 1 CREAM TOPICAL at 16:17

## 2024-02-22 RX ADMIN — ONDANSETRON 4 MG: 2 INJECTION INTRAMUSCULAR; INTRAVENOUS at 09:17

## 2024-02-22 RX ADMIN — SODIUM CHLORIDE, SODIUM LACTATE, CALCIUM CHLORIDE, MAGNESIUM CHLORIDE AND DEXTROSE 6000 ML: 1.5; 538; 448; 18.3; 5.08 INJECTION, SOLUTION INTRAPERITONEAL at 16:17

## 2024-02-22 RX ADMIN — HEPARIN SODIUM 5000 UNITS: 5000 INJECTION INTRAVENOUS; SUBCUTANEOUS at 09:09

## 2024-02-22 ASSESSMENT — PAIN DESCRIPTION - LOCATION
LOCATION: ABDOMEN

## 2024-02-22 ASSESSMENT — PAIN DESCRIPTION - FREQUENCY
FREQUENCY: CONTINUOUS

## 2024-02-22 ASSESSMENT — PAIN - FUNCTIONAL ASSESSMENT
PAIN_FUNCTIONAL_ASSESSMENT: PREVENTS OR INTERFERES SOME ACTIVE ACTIVITIES AND ADLS
PAIN_FUNCTIONAL_ASSESSMENT: ACTIVITIES ARE NOT PREVENTED
PAIN_FUNCTIONAL_ASSESSMENT: PREVENTS OR INTERFERES SOME ACTIVE ACTIVITIES AND ADLS
PAIN_FUNCTIONAL_ASSESSMENT: ACTIVITIES ARE NOT PREVENTED

## 2024-02-22 ASSESSMENT — PAIN DESCRIPTION - PAIN TYPE
TYPE: ACUTE PAIN;CHRONIC PAIN

## 2024-02-22 ASSESSMENT — PAIN DESCRIPTION - ORIENTATION
ORIENTATION: LEFT

## 2024-02-22 ASSESSMENT — PAIN SCALES - GENERAL
PAINLEVEL_OUTOF10: 6
PAINLEVEL_OUTOF10: 8
PAINLEVEL_OUTOF10: 9
PAINLEVEL_OUTOF10: 6
PAINLEVEL_OUTOF10: 3

## 2024-02-22 ASSESSMENT — PAIN DESCRIPTION - DESCRIPTORS
DESCRIPTORS: ACHING

## 2024-02-22 ASSESSMENT — PAIN DESCRIPTION - ONSET
ONSET: ON-GOING

## 2024-02-22 NOTE — ED NOTES
Patient assisted to bedside commode x2 assist. Patient unsteady on feet with standing and movement. Pt had urine occurrence and placed in clean brief. Patient returned to bed safely and placed on monitor x3.

## 2024-02-22 NOTE — ED TRIAGE NOTES
Triage: Pt arrives from home with CC of vomiting. Pt was admitted to hospital in January for peritonitis and discharged on vancomycin. She has been vomiting ever since. Her GI doctor referred her to ED with concerns for dehydration. She reports some left sided upper abdominal pain associated with a fall she had earlier this week.

## 2024-02-22 NOTE — ED PROVIDER NOTES
[facility] EMERGENCY DEP  EMERGENCY DEPARTMENT ENCOUNTER      Patient Name: Galilea Aguilar  MRN: [id number]  Birthdate 1955  Date of Evaluation: 2/21/2024  Physician: Alonzo Garcia MD    CHIEF COMPLAINT       Chief Complaint   Patient presents with    Emesis       HISTORY OF PRESENT ILLNESS   (Location/Symptom, Timing/Onset, Context/Setting, Quality, Duration, Modifying Factors, Severity)   Galilea Aguilar, 68 y.o., female     68-year-old female with a history of CAD, COPD, CVA, diabetes, ESRD on peritoneal dialysis presents with chief complaint of vomiting and left side/flank pain          Nursing Notes were reviewed.    REVIEW OF SYSTEMS    (Not required)   Review of Systems   Gastrointestinal:  Positive for abdominal pain.       Except as noted above the remainder of the review of systems was reviewed and negative.     PAST MEDICAL HISTORY     Past Medical History:   Diagnosis Date    Asthma     CAD (coronary artery disease)     COPD (chronic obstructive pulmonary disease) ([facility])     PCP manages    CVA (cerebral vascular accident) ([facility]) 11/01/2023    Patient presented with right-sided weakness.  Per MRI of the brain on 11/1/2023, acute versus subacute embolic infarctions involving the left MCA territory and right PICA territory    Diabetes mellitus, type 2 ([facility])     ESRD on peritoneal dialysis ([facility])     Eduardo (Tammie-PD nurse) [facility] [phone]- was on hemodialysis M-W-F Eduardo - now on PD    GERD (gastroesophageal reflux disease)     History of blood transfusion     Hyperlipidemia     Hypertension        SURGICAL HISTORY       Past Surgical History:   Procedure Laterality Date    CARDIAC PROCEDURE N/A 8/23/2023    Left heart cath / coronary angiography performed by Chandan Leach MD at [facility] CARDIAC CATH LAB    COLONOSCOPY N/A 1/9/2023    COLONOSCOPY performed by Drew Pizarro MD at [facility] ENDOSCOPY    CORONARY ANGIOPLASTY WITH STENT PLACEMENT      multiple pt unsure of how many    CORONARY ARTERY BYPASS GRAFT N/A      RADIOLOGY:   Non-plain film images such as CT, Ultrasound and MRI are read by the radiologist. Plain radiographic images are visualized and preliminarily interpreted by the emergency physician with the below findings:        Interpretation per the Radiologist below, if available at the time of this note:    CT ABDOMEN PELVIS WO CONTRAST Additional Contrast? None   Final Result      1. No evidence for renal pelvocaliectasis or ureteral dilation. Right renal   cysts again shown.   2. Large right-sided retroperitoneal mass post primarily of fat though with the   internal strand-like opacifications. Findings consistent with fat-containing   neoplasm. Diagnostic considerations include low-grade liposarcoma, atypical   lipomatous tumor, and lipoma. 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 Findings   demonstrated previously appears similar   3. Gallstones and gallbladder sludge again demonstrated.   4. Calcified retroperitoneal lymph nodes again demonstrated.   6. Peroneal dialysis catheter. Moderate free intraperineal fluid.   7. See report for details.      XR CHEST (2 VW)   Final Result      Left basilar atelectasis and small pleural effusion.               ED BEDSIDE ULTRASOUND:   Performed by ED Physician    LABS:  Labs Reviewed   CBC WITH AUTO DIFFERENTIAL - Abnormal; Notable for the following components:       Result Value    Hemoglobin 11.1 (*)     Hematocrit 34.2 (*)     RDW 18.2 (*)     All other components within normal limits   COMPREHENSIVE METABOLIC PANEL - Abnormal; Notable for the following components:    Sodium 131 (*)     Potassium 3.0 (*)     Chloride 95 (*)     Glucose 141 (*)     Creatinine 5.43 (*)     Bun/Cre Ratio 2 (*)     Est, Glom Filt Rate 8 (*)     Calcium 7.1 (*)     Alk Phosphatase 154 (*)     Albumin 1.6 (*)     Globulin 5.8 (*)     Albumin/Globulin Ratio 0.3 (*)     All other components within normal limits   MAGNESIUM - Abnormal; Notable for the following components:    Magnesium 1.5 (*)

## 2024-02-22 NOTE — CONSULTS
CORONARY ANGIOPLASTY WITH STENT PLACEMENT      multiple pt unsure of how many    CORONARY ARTERY BYPASS GRAFT N/A 2023    CORONARY ARTERY BYPASS GRAFTING X 3 WITH LIMA, L[facility]VGH, ECC, SONIA AND EPIAORTIC U/S BY DR YANCEY performed by Moose Gallegos MD at [facility] OPEN HEART    DELIVERY       HYSTERECTOMY (CERVIX STATUS UNKNOWN)      IR INSERT NON TUNNELED CV CATH LESS THAN 5 YEARS  2023    IR INSERT NON TUNNELED CV CATH LESS THAN 5 YEARS 2023 [facility] RAD ANGIO IR    IR NONTUNNELED VASCULAR CATHETER  12/3/2021    IR NONTUNNELED VASCULAR CATHETER 12/3/2021 [facility] RAD ANGIO IR    IR NONTUNNELED VASCULAR CATHETER  2021    IR TUNNELED CATHETER PLACEMENT GREATER THAN 5 YEARS  2021    IR TUNNELED CATHETER PLACEMENT GREATER THAN 5 YEARS 2021 [facility] RAD ANGIO IR    IR TUNNELED CATHETER PLACEMENT GREATER THAN 5 YEARS  2021    peritoneal dialysis    GA UNLISTED PROCEDURE CARDIAC SURGERY      UPPER GASTROINTESTINAL ENDOSCOPY N/A 2023    EGD BIOPSY performed by Drew Pizarro MD at [facility] ENDOSCOPY    UPPER GASTROINTESTINAL ENDOSCOPY N/A 10/17/2023    EGD ESOPHAGOGASTRODUODENOSCOPY performed by Jared Griffiths MD at [facility] ENDOSCOPY    VASCULAR SURGERY      leg stent       Allergies:  Allergies   Allergen Reactions    Fd&C Blue #1 (Brilliant Blue) Hives    Iodinated Contrast Media Hives       Home Medications:  Prior to Admission Medications   Prescriptions Last Dose Informant Patient Reported? Taking?   Blood Glucose Monitoring Suppl (BLOOD GLUCOSE MONITOR SYSTEM) w/Device KIT Unknown  No No   Si Device by Does not apply route 4 times daily (before meals and nightly) Pharmacist to identify preferred meter and strips.   Patient not taking: Reported on 2023   DULoxetine (CYMBALTA) 20 MG extended release capsule 2024  Yes Yes   Sig: Take 1 capsule by mouth 2 times daily   Dulaglutide (TRULICITY) 0.75 MG/0.5ML SOPN Past Week  Yes Yes   Sig: .5 milliliters inject under the skin each week  lymph nodes again demonstrated.  6. Peroneal dialysis catheter. Moderate free intraperineal fluid.  7. See report for details.    We will optimize anti- nausea regimen with use of medication that does not affect Qtc.   - D/C Zofran  - Add scopolamine patch and consider addition of marinol.   - Minimize medications known to cause nausea- ?Trulicity   - Supportive care per hospitalist   - GI following            Patient Active Hospital Problem List:   Principal Problem:    Vomiting  Resolved Problems:    * No resolved hospital problems. *    DAYA Calvert - NP

## 2024-02-22 NOTE — H&P
liposarcoma is favored. Findings   demonstrated previously appears similar   3. Gallstones and gallbladder sludge again demonstrated.   4. Calcified retroperitoneal lymph nodes again demonstrated.   6. Peroneal dialysis catheter. Moderate free intraperineal fluid.   7. See report for details.      XR CHEST (2 VW)   Final Result      Left basilar atelectasis and small pleural effusion.            Notes reviewed from all clinical/nonclinical/nursing services involved in patient's clinical care. Care coordination discussions were held with appropriate clinical/nonclinical/ nursing providers based on care coordination needs.     Assessment/Plan:     Nausea and Committing with recent Peritonitis  Retroperitoneal Mass  - CT scan on 2/21: No evidence for renal pelvis oh calyectasis or ureteral dilation.  Right renal cyst shown.  Large right-sided retroperitoneal mass primarily of fat with internal strand-like opacifications.  Finding consistent with fat-containing neoplasm.  Diagnostic considerations include low-grade liposarcoma, atypical like comatose tumor and lipoma.  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  Gallstones and gallbladder sludge again demonstrated.  Calcified retroperitoneal lymph nodes again demonstrated.  Patient has had multiple CT scans over the last several years that demonstrate this suspected lipoma.  - Hydrate due to N/V  - Clear Liquid diet  - GI consulted for persistent N/V since discharge several weeks ago - no hematemesis    Prolonged Qtc  - Recommended holding fluconazole  - EKG in am  - monitor on tele    Hyponatremia  Hypokalemia  Hypomagnesemia  - replete with IV/supplemental K  - recheck labs in am    Hx ESRD on PD  Recent history of peritonitis and on antibiotics PTA  - no fevers, no leukocytosis  - nephrology has been consulted and seen - to re-order PD fluid and plan to sample abdominal dwell fluid  - Patient was on vancomycin and fluconazole prior to admission, discussed this with nephrology  at bedside.  They will reorder vancomycin after patient has had a sample taken from her abdomen.  Recommended holding fluconazole for now due to prolonged QTc    Hx CAD  Hx CVA  History of atrial fibrillation  -Resume home meds    Hx Diabetes  - holding home lantus for now due to compromised intakes  - resume prn  - SSI ordered    DIET: ADULT DIET; Clear Liquid   ISOLATION PRECAUTIONS: No active isolations  CODE STATUS: Full Code   Central Line:   none  DVT PROPHYLAXIS: Eliquis  FUNCTIONAL STATUS PRIOR TO HOSPITALIZATION: Fully active and ambulatory; able to carry on all self-care without restriction.  Ambulatory status/function: By self   EARLY MOBILITY ASSESSMENT: Recommend routine ambulation while hospitalized with the assistance of nursing staff  ANTICIPATED DISCHARGE: Greater than 48 hours.  ANTICIPATED DISPOSITION: Home  EMERGENCY CONTACT/SURROGATE DECISION MAKER: Chaz Aguilar [phone]    CRITICAL CARE WAS PERFORMED FOR THIS ENCOUNTER: NO.      Signed By: DAYA Mehta - RADHA     February 22, 2024         Please note that this dictation may have been completed with Dragon, the computer voice recognition software.  Quite often unanticipated grammatical, syntax, homophones, and other interpretive errors are inadvertently transcribed by the computer software.  Please disregard these errors.  Please excuse any errors that have escaped final proofreading.

## 2024-02-22 NOTE — CONSULTS
[facility]      Nephrology  Note  Galilea Aguilar  Date of Admission : 2/21/2024    CC:  Follow up for ESRD       Assessment and Plan     ESRD on PD followed by Dr. Chiu [facility]  - CCPD outpatient prescription: 67182 ml 2.5% solutions 2300 ml  1hr. 25 min dwell time x 4 with last fill extraneal 1000 cc  - Prescription in hospital: CCPD 59903 ml 1.5% solution x 5 exchanges 2500 cc  - daily PD cell count/ Gram stain  - [facility] notified with plans for PD fluid culture to be obtained  - Daily PD exit site care with gentamicin to site  - Hold Diflucan at this time with prolonged QT  - Recommend repeat Blood cultures    Prior Peritonitis 1/26 with (+) Blood cultures 1/25  - received Vanc/ Cefepime last admission  - on IVP Vanc outpatient last dose 2/20/24- next dose due 2/23/24  - repeat PD specimens per above  - check van level     Chronic hypokalemia/ Hypomag  - started on IVF with KCL by primary team  - replete PRN    Abdominal pain with vomiting  - CT showing gall stones with sludge, right retroperitoneal mass consistent with Lipoma that is unchanged from prior study. Renal cysts  - GI has been consulted    Anemia of CKD  - Hgb stable no need for LIS    MBD of CKD  - corcal stable  - will check phos level    Hypoalbuminemia  - recommend protein supplement when able to tolerate PO     HTN   DM  COPD  CAD    Thank you for the consult nephrology will continue to follow       Interval History:  This is 69 yo female with ESRD on PD with recent peritonitis (Staph Epidermis) / bacteremia that was discharged home (2/9) on IP Vanc q 3 days.  The patient has suffered from ongoing nausea and vomiting since her last admission. According to the outpatient PD nurse a vanc level has not been checked since her discharge.  She has been receiving Vanc 750 mg q 3 days IP last dose on 2/20/24.  She was taking Diflucan for prophylaxis for  antifungal but denies any other antibiotics, this

## 2024-02-22 NOTE — FLOWSHEET NOTE
02/22/24 1645   Vitals   /68   Temp 98 °F (36.7 °C)   Temp Source Oral   Pulse 84   Respirations 13   SpO2 98 %   Observations & Evaluations   Level of Consciousness 0   Heart Rhythm Regular   Respiratory Quality/Effort Unlabored   O2 Device None (Room air)   Skin Condition/Temp Warm;Dry   Abdomen Inspection Rounded;Soft   Edema None   Peritoneal Dialysis Catheter Mid lower abdomen   No placement date or time found.   Catheter Location: Mid lower abdomen   Status Accessed   Site Condition Clean, dry, intact   Dressing Status New dressing applied   Dressing Gauze  (with gentamicin cream)   Date of Last Dressing Change 02/22/24   Dialysis Type Continuous cycling   Exit Site Condition good   Catheter Care Given Yes   Cycler   Verification of Prescription CCPD   Informed Consent    (chronic consent conveys)   Total Volume Programmed 65121 mL  (1000 last fill x 2 for specimen collection)   Therapy Time (Hours:Minutes) 9:30   Cycler Type Jung [facility]   Fill Volume 2500 mL   Last Fill Volume 1000 mL   Dextrose Setting Same (Nonextraneal)   I Drain Alarm 700 mL   Number of Cycles 5   Bag Volume 6000 mL   Number of Bags Used 3   Dianeal Solution Other (Comment)  (1.5%)     Primary RN SBAR: BRONWYN Jang RN   Patient Education: infection control wearing mask initiation and discontinuation of PD treatment   Hospital associated wait time; reason: 0  Hepatitis B Surface Ag   Date/Time Value Ref Range Status   01/28/2024 07:09 AM <0.10 Index Final     Hep B S Ab   Date/Time Value Ref Range Status   01/31/2024 06:50 AM <3.10 mIU/mL Final     1645 orders, labs consent (chronic consent conveys) and code status reviewed.   PD initiated as ordered.  Start Time:  1700  End 0230 plus 2 hours dwell 0430 for specimen collection

## 2024-02-22 NOTE — HOME HEALTH
Subjective: Pt states she is still vomiting and is unable to keep any food down  Falls since last visit No(if yes complete the Fall Tracking Form and include bsrifallreport):   Caregiver involvement changes: na  Home health supplies by type and quantity ordered/delivered this visit include: na    Clinician asked if patient has had any physician contact since last home care visit and patient states: NO  Clinician asked if patient has any new or changed medications and patient states:  N/A   If Yes, were medications reconciled? N/A   Was the certifying physician notified of changes in medications? N/A     Clinical assessment (what this visit means for the patient overall and need for ongoing skilled care) and progress or lack of progress towards SPECIFIC goals: CG plans to take pt to ER today for left side pain and vomiting. Pt unable to keep food down but is able to keep medication down. Pt denied any other symptoms, stated she has been experiencing vomiting since she was in the hospital. Continued SN needed for CHF assessment and education.    Written Teaching Material Utilized: N/A    Interdisciplinary communication with: N/A for the purpose of na    Discharge planning as follows: When goals are met    Specific plan for next visit: assess N/V, CHF education

## 2024-02-22 NOTE — ED NOTES
Bedside and Verbal shift change report given to JANES Lucero (oncoming nurse) by JANES Sierra (offgoing nurse). Report included the following information ED SBAR, MAR, Recent Results, and Med Rec Status.

## 2024-02-23 ENCOUNTER — HOME CARE VISIT (OUTPATIENT)
Facility: HOME HEALTH | Age: 69
End: 2024-02-23
Payer: MEDICARE

## 2024-02-23 PROBLEM — R11.2 INTRACTABLE NAUSEA AND VOMITING: Status: ACTIVE | Noted: 2024-02-23

## 2024-02-23 PROBLEM — E11.21 TYPE 2 DIABETES WITH NEPHROPATHY (HCC): Status: ACTIVE | Noted: 2024-02-23

## 2024-02-23 LAB
25(OH)D3 SERPL-MCNC: 11.4 NG/ML (ref 30–100)
ANION GAP SERPL CALC-SCNC: 7 MMOL/L (ref 5–15)
APPEARANCE FLD: CLEAR
BASOPHILS # BLD: 0.1 K/UL (ref 0–0.1)
BASOPHILS NFR BLD: 1 % (ref 0–1)
BUN SERPL-MCNC: 12 MG/DL (ref 6–20)
BUN SERPL-MCNC: 14 MG/DL (ref 6–20)
BUN SERPL-MCNC: 14 MG/DL (ref 6–20)
BUN/CREAT SERPL: 2 (ref 12–20)
BUN/CREAT SERPL: 3 (ref 12–20)
BUN/CREAT SERPL: 3 (ref 12–20)
CA-I BLD-SCNC: 0.91 MMOL/L (ref 1.13–1.32)
CALCIUM SERPL-MCNC: 6.4 MG/DL (ref 8.5–10.1)
CALCIUM SERPL-MCNC: 6.6 MG/DL (ref 8.5–10.1)
CALCIUM SERPL-MCNC: 6.8 MG/DL (ref 8.5–10.1)
CALCIUM SERPL-MCNC: 6.9 MG/DL (ref 8.5–10.1)
CHLORIDE SERPL-SCNC: 100 MMOL/L (ref 97–108)
CHLORIDE SERPL-SCNC: 100 MMOL/L (ref 97–108)
CHLORIDE SERPL-SCNC: 101 MMOL/L (ref 97–108)
CO2 SERPL-SCNC: 26 MMOL/L (ref 21–32)
CO2 SERPL-SCNC: 28 MMOL/L (ref 21–32)
CO2 SERPL-SCNC: 30 MMOL/L (ref 21–32)
COLOR FLD: COLORLESS
CREAT SERPL-MCNC: 5.24 MG/DL (ref 0.55–1.02)
CREAT SERPL-MCNC: 5.29 MG/DL (ref 0.55–1.02)
CREAT SERPL-MCNC: 5.42 MG/DL (ref 0.55–1.02)
DIFFERENTIAL METHOD BLD: ABNORMAL
EOSINOPHIL # BLD: 0.3 K/UL (ref 0–0.4)
EOSINOPHIL NFR BLD: 4 % (ref 0–7)
ERYTHROCYTE [DISTWIDTH] IN BLOOD BY AUTOMATED COUNT: 18.4 % (ref 11.5–14.5)
GLUCOSE BLD STRIP.AUTO-MCNC: 157 MG/DL (ref 65–117)
GLUCOSE BLD STRIP.AUTO-MCNC: 96 MG/DL (ref 65–117)
GLUCOSE BLD STRIP.AUTO-MCNC: 96 MG/DL (ref 65–117)
GLUCOSE BLD STRIP.AUTO-MCNC: 97 MG/DL (ref 65–117)
GLUCOSE SERPL-MCNC: 102 MG/DL (ref 65–100)
GLUCOSE SERPL-MCNC: 90 MG/DL (ref 65–100)
GLUCOSE SERPL-MCNC: 97 MG/DL (ref 65–100)
HCT VFR BLD AUTO: 31.1 % (ref 35–47)
HGB BLD-MCNC: 10 G/DL (ref 11.5–16)
IMM GRANULOCYTES # BLD AUTO: 0 K/UL (ref 0–0.04)
IMM GRANULOCYTES NFR BLD AUTO: 0 % (ref 0–0.5)
LYMPHOCYTES # BLD: 1.7 K/UL (ref 0.8–3.5)
LYMPHOCYTES NFR BLD: 27 % (ref 12–49)
LYMPHOCYTES NFR FLD: 10 %
MAGNESIUM SERPL-MCNC: NORMAL MG/DL (ref 1.6–2.4)
MCH RBC QN AUTO: 28.6 PG (ref 26–34)
MCHC RBC AUTO-ENTMCNC: 32.2 G/DL (ref 30–36.5)
MCV RBC AUTO: 88.9 FL (ref 80–99)
MONOCYTES # BLD: 0.6 K/UL (ref 0–1)
MONOCYTES NFR BLD: 9 % (ref 5–13)
MONOS+MACROS NFR FLD: 80 %
NEUTROPHILS NFR FLD: 10 %
NEUTS SEG # BLD: 3.8 K/UL (ref 1.8–8)
NEUTS SEG NFR BLD: 59 % (ref 32–75)
NRBC # BLD: 0 K/UL (ref 0–0.01)
NRBC BLD-RTO: 0 PER 100 WBC
NUC CELL # FLD: 5 /CU MM
PLATELET # BLD AUTO: 188 K/UL (ref 150–400)
PMV BLD AUTO: 10.6 FL (ref 8.9–12.9)
POTASSIUM SERPL-SCNC: 2.3 MMOL/L (ref 3.5–5.1)
POTASSIUM SERPL-SCNC: 2.7 MMOL/L (ref 3.5–5.1)
POTASSIUM SERPL-SCNC: ABNORMAL MMOL/L (ref 3.5–5.1)
RBC # BLD AUTO: 3.5 M/UL (ref 3.8–5.2)
RBC # FLD: 0 /CU MM
SERVICE CMNT-IMP: ABNORMAL
SERVICE CMNT-IMP: NORMAL
SODIUM SERPL-SCNC: 133 MMOL/L (ref 136–145)
SODIUM SERPL-SCNC: 136 MMOL/L (ref 136–145)
SODIUM SERPL-SCNC: 137 MMOL/L (ref 136–145)
SPECIMEN SOURCE FLD: ABNORMAL
TOTAL CELLS COUNTED SPEC: 10
WBC # BLD AUTO: 6.4 K/UL (ref 3.6–11)

## 2024-02-23 PROCEDURE — 99222 1ST HOSP IP/OBS MODERATE 55: CPT | Performed by: CLINICAL NURSE SPECIALIST

## 2024-02-23 PROCEDURE — 6360000002 HC RX W HCPCS: Performed by: NURSE PRACTITIONER

## 2024-02-23 PROCEDURE — 6370000000 HC RX 637 (ALT 250 FOR IP): Performed by: NURSE PRACTITIONER

## 2024-02-23 PROCEDURE — 97161 PT EVAL LOW COMPLEX 20 MIN: CPT

## 2024-02-23 PROCEDURE — 80048 BASIC METABOLIC PNL TOTAL CA: CPT

## 2024-02-23 PROCEDURE — 83735 ASSAY OF MAGNESIUM: CPT

## 2024-02-23 PROCEDURE — 82962 GLUCOSE BLOOD TEST: CPT

## 2024-02-23 PROCEDURE — 89050 BODY FLUID CELL COUNT: CPT

## 2024-02-23 PROCEDURE — 2060000000 HC ICU INTERMEDIATE R&B

## 2024-02-23 PROCEDURE — 2580000003 HC RX 258: Performed by: NURSE PRACTITIONER

## 2024-02-23 PROCEDURE — 97116 GAIT TRAINING THERAPY: CPT

## 2024-02-23 PROCEDURE — 82330 ASSAY OF CALCIUM: CPT

## 2024-02-23 PROCEDURE — 6370000000 HC RX 637 (ALT 250 FOR IP): Performed by: STUDENT IN AN ORGANIZED HEALTH CARE EDUCATION/TRAINING PROGRAM

## 2024-02-23 PROCEDURE — 85025 COMPLETE CBC W/AUTO DIFF WBC: CPT

## 2024-02-23 PROCEDURE — 93005 ELECTROCARDIOGRAM TRACING: CPT | Performed by: NURSE PRACTITIONER

## 2024-02-23 PROCEDURE — 6360000002 HC RX W HCPCS: Performed by: INTERNAL MEDICINE

## 2024-02-23 PROCEDURE — 97535 SELF CARE MNGMENT TRAINING: CPT

## 2024-02-23 PROCEDURE — 82310 ASSAY OF CALCIUM: CPT

## 2024-02-23 PROCEDURE — 94640 AIRWAY INHALATION TREATMENT: CPT

## 2024-02-23 PROCEDURE — 36415 COLL VENOUS BLD VENIPUNCTURE: CPT

## 2024-02-23 PROCEDURE — 97165 OT EVAL LOW COMPLEX 30 MIN: CPT

## 2024-02-23 RX ORDER — LIDOCAINE 4 G/G
1 PATCH TOPICAL DAILY
Status: DISCONTINUED | OUTPATIENT
Start: 2024-02-23 | End: 2024-03-16 | Stop reason: HOSPADM

## 2024-02-23 RX ORDER — CARVEDILOL 6.25 MG/1
6.25 TABLET ORAL 2 TIMES DAILY WITH MEALS
Status: DISCONTINUED | OUTPATIENT
Start: 2024-02-23 | End: 2024-03-16 | Stop reason: HOSPADM

## 2024-02-23 RX ORDER — ASPIRIN 81 MG/1
81 TABLET, CHEWABLE ORAL DAILY
Status: DISCONTINUED | OUTPATIENT
Start: 2024-02-23 | End: 2024-03-16 | Stop reason: HOSPADM

## 2024-02-23 RX ORDER — IPRATROPIUM BROMIDE AND ALBUTEROL SULFATE 2.5; .5 MG/3ML; MG/3ML
1 SOLUTION RESPIRATORY (INHALATION) EVERY 6 HOURS PRN
Status: DISCONTINUED | OUTPATIENT
Start: 2024-02-23 | End: 2024-03-04 | Stop reason: SDUPTHER

## 2024-02-23 RX ORDER — CALCIUM GLUCONATE 20 MG/ML
2000 INJECTION, SOLUTION INTRAVENOUS ONCE
Status: COMPLETED | OUTPATIENT
Start: 2024-02-23 | End: 2024-02-23

## 2024-02-23 RX ORDER — MONTELUKAST SODIUM 10 MG/1
10 TABLET ORAL NIGHTLY
Status: DISCONTINUED | OUTPATIENT
Start: 2024-02-23 | End: 2024-03-16 | Stop reason: HOSPADM

## 2024-02-23 RX ORDER — DULOXETIN HYDROCHLORIDE 20 MG/1
20 CAPSULE, DELAYED RELEASE ORAL 2 TIMES DAILY
Status: DISCONTINUED | OUTPATIENT
Start: 2024-02-23 | End: 2024-03-16 | Stop reason: HOSPADM

## 2024-02-23 RX ADMIN — MORPHINE SULFATE 1 MG: 2 INJECTION, SOLUTION INTRAMUSCULAR; INTRAVENOUS at 22:28

## 2024-02-23 RX ADMIN — SODIUM CHLORIDE, SODIUM LACTATE, CALCIUM CHLORIDE, MAGNESIUM CHLORIDE AND DEXTROSE 6000 ML: 1.5; 538; 448; 18.3; 5.08 INJECTION, SOLUTION INTRAPERITONEAL at 18:34

## 2024-02-23 RX ADMIN — MORPHINE SULFATE 1 MG: 2 INJECTION, SOLUTION INTRAMUSCULAR; INTRAVENOUS at 03:53

## 2024-02-23 RX ADMIN — Medication 10 ML: at 08:30

## 2024-02-23 RX ADMIN — ASPIRIN 81 MG CHEWABLE TABLET 81 MG: 81 TABLET CHEWABLE at 17:47

## 2024-02-23 RX ADMIN — ARFORMOTEROL TARTRATE: 15 SOLUTION RESPIRATORY (INHALATION) at 19:47

## 2024-02-23 RX ADMIN — EPOETIN ALFA-EPBX 20000 UNITS: 10000 INJECTION, SOLUTION INTRAVENOUS; SUBCUTANEOUS at 17:47

## 2024-02-23 RX ADMIN — MORPHINE SULFATE 1 MG: 2 INJECTION, SOLUTION INTRAMUSCULAR; INTRAVENOUS at 13:42

## 2024-02-23 RX ADMIN — POTASSIUM BICARBONATE 40 MEQ: 782 TABLET, EFFERVESCENT ORAL at 10:40

## 2024-02-23 RX ADMIN — POTASSIUM BICARBONATE 40 MEQ: 782 TABLET, EFFERVESCENT ORAL at 21:09

## 2024-02-23 RX ADMIN — Medication 10 ML: at 21:10

## 2024-02-23 RX ADMIN — GENTAMICIN SULFATE: 1 CREAM TOPICAL at 18:34

## 2024-02-23 RX ADMIN — PROCHLORPERAZINE EDISYLATE 5 MG: 5 INJECTION INTRAMUSCULAR; INTRAVENOUS at 22:29

## 2024-02-23 RX ADMIN — PROCHLORPERAZINE EDISYLATE 5 MG: 5 INJECTION INTRAMUSCULAR; INTRAVENOUS at 03:53

## 2024-02-23 RX ADMIN — DULOXETINE HYDROCHLORIDE 20 MG: 20 CAPSULE, DELAYED RELEASE ORAL at 21:09

## 2024-02-23 RX ADMIN — MORPHINE SULFATE 1 MG: 2 INJECTION, SOLUTION INTRAMUSCULAR; INTRAVENOUS at 18:00

## 2024-02-23 RX ADMIN — MONTELUKAST 10 MG: 10 TABLET, FILM COATED ORAL at 21:09

## 2024-02-23 RX ADMIN — APIXABAN 2.5 MG: 2.5 TABLET, FILM COATED ORAL at 21:09

## 2024-02-23 RX ADMIN — CARVEDILOL 6.25 MG: 6.25 TABLET, FILM COATED ORAL at 17:47

## 2024-02-23 RX ADMIN — CALCIUM GLUCONATE 2000 MG: 20 INJECTION, SOLUTION INTRAVENOUS at 17:48

## 2024-02-23 ASSESSMENT — PAIN SCALES - GENERAL
PAINLEVEL_OUTOF10: 5
PAINLEVEL_OUTOF10: 8
PAINLEVEL_OUTOF10: 6
PAINLEVEL_OUTOF10: 5
PAINLEVEL_OUTOF10: 8
PAINLEVEL_OUTOF10: 8
PAINLEVEL_OUTOF10: 10

## 2024-02-23 ASSESSMENT — PAIN DESCRIPTION - DESCRIPTORS
DESCRIPTORS: PATIENT UNABLE TO DESCRIBE;SORE
DESCRIPTORS: PATIENT UNABLE TO DESCRIBE
DESCRIPTORS: ACHING

## 2024-02-23 ASSESSMENT — PAIN DESCRIPTION - ORIENTATION
ORIENTATION: LEFT
ORIENTATION: RIGHT
ORIENTATION: LEFT

## 2024-02-23 ASSESSMENT — PAIN DESCRIPTION - LOCATION
LOCATION: ABDOMEN
LOCATION: FLANK
LOCATION: ABDOMEN
LOCATION: FLANK

## 2024-02-23 NOTE — PLAN OF CARE
Problem: Physical Therapy - Adult  Goal: By Discharge: Performs mobility at highest level of function for planned discharge setting.  See evaluation for individualized goals.  Description: FUNCTIONAL STATUS PRIOR TO ADMISSION: Patient was modified independent using a rolling walker for functional mobility, wheelchair for longer distances. Pt reported a fall a few days prior to this admission    HOME SUPPORT PRIOR TO ADMISSION: The patient lived with family and required assist for some ADLs.    Physical Therapy Goals  Initiated 2/23/2024  1.  Patient will move from supine to sit and sit to supine and roll side to side in bed with modified independence within 7 day(s).    2.  Patient will perform sit to stand with modified independence within 7 day(s).  3.  Patient will transfer from bed to chair and chair to bed with modified independence using the least restrictive device within 7 day(s).  4.  Patient will ambulate with supervision/set-up for 50 feet with the least restrictive device within 7 day(s).   5.  Patient will ascend/descend 4 stairs with single handrail(s) with supervision/set-up within 7 day(s).    Outcome: Progressing   PHYSICAL THERAPY EVALUATION    Patient: Galilea Aguilar (68 y.o. female)  Date: 2/23/2024  Primary Diagnosis: Hypomagnesemia [E83.42]  Vomiting [R11.10]  Prolonged Q-T interval on ECG [R94.31]  Intractable nausea and vomiting [R11.2]       Precautions: Restrictions/Precautions: Contact Precautions, Fall Risk                      ASSESSMENT :   DEFICITS/IMPAIRMENTS:   The patient is limited by decreased functional mobility, balance, gait s/p admission on 2/21 with intractable nausea and vomiting. Pt reported a recent fall prior to this admission with L sided flank pain.      Based on the impairments listed above pt required CGA bed mobility, transferred with RW with CGA and tolerated gait training x 15 ft x 2 with CGA. Pt with generalized instability during gait, but no overt LOB noted.  Carlos Carmen.  Validity of the AM-PAC “6-Clicks” Inpatient Daily Activity and Basic Mobility Short Forms. Physical Therapy Mar 2014, 94 3) [phone]; DOI: 10.2522/ptj.32852473  2. Samy WILSON, Konrad ZEPEDA, Perla ZEPEDA, Jessica ZEPEDA. Association of AM-PAC \"6-Clicks\" Basic Mobility and Daily Activity Scores With Discharge Destination. Phys Ther. 2021 Apr 4;101(4):zowe239. doi: 10.1093/ptj/wolb199. PMID: 13657473.  3. King J, Sophia D, Nathaly S, Aamir K, Mirta S. Activity Measure for Post-Acute Care \"6-Clicks\" Basic Mobility Scores Predict Discharge Destination After Acute Care Hospitalization in Select Patient Groups: A Retrospective, Observational Study. Arch Rehabil Res Clin Transl. 2022 Jul 16;4(3):747213. doi: 10.1016/j.arrct.2022.972764. PMID: 10715292; PMCID: [id number].  4. Nella TY, Zee S, Bernardo W, Mary P. AM-PAC Short Forms Manual 4.0. Revised 2/2020.                                                                                                                                                                                                                               Pain Rating:  Pt reported L flank pain  Pain Intervention(s):   repositioning    Activity Tolerance:   Good and Fair     After treatment:   Patient left in no apparent distress in bed, Call bell within reach, and Side rails x3    COMMUNICATION/EDUCATION:   The patient's plan of care was discussed with: occupational therapist and registered nurse    Patient Education  Education Given To: Patient  Education Provided: Role of Therapy;Plan of Care  Education Method: Verbal  Barriers to Learning: None  Education Outcome: Verbalized understanding    Thank you for this referral.  Malika Singh, PT, DPT  Minutes: 21

## 2024-02-23 NOTE — CONSULTS
Comprehensive Nutrition Assessment    Type and Reason for Visit: Initial, Positive Nutrition Screen    Nutrition Recommendations/Plan:   Continue clear liquid.  Diet advancement per GI.  Added Ensure Clear and Gelatein for pt to try.  She does not like the milkier ONS- so please do not adjust these when diet is advanced.       Malnutrition Assessment:  Malnutrition Status:  Moderate malnutrition (02/23/24 1431)    Context:  Chronic Illness     Findings of the 6 clinical characteristics of malnutrition:  Energy Intake:  Unable to assess  Weight Loss:  Greater than 7.5% over 3 months     Body Fat Loss:  Mild body fat loss Triceps   Muscle Mass Loss:  Unable to assess    Fluid Accumulation:  No significant fluid accumulation     Strength:  Not Performed       Nutrition Assessment:    Past Medical History:   Diagnosis Date    Asthma     CAD (coronary artery disease)     COPD (chronic obstructive pulmonary disease) ([facility])     PCP manages    CVA (cerebral vascular accident) ([facility]) 11/01/2023    Patient presented with right-sided weakness.  Per MRI of the brain on 11/1/2023, acute versus subacute embolic infarctions involving the left MCA territory and right PICA territory    Diabetes mellitus, type 2 ([facility])     ESRD on peritoneal dialysis ([facility])     Eduardo (Tammie-PD nurse) [facility] [phone]- was on hemodialysis M-W-F Eduardo - now on PD    GERD (gastroesophageal reflux disease)     History of blood transfusion     Hyperlipidemia     Hypertension        Pt admitted with Hypomagnesemia [E83.42]  Vomiting [R11.10]  Prolonged Q-T interval on ECG [R94.31]  Intractable nausea and vomiting [R11.2]  GI following.  RD consulted for weight loss.    Visited with pt, not the best historian.   She reports she knows she has a lost a lot of weight, but doesn't know how much (unsure of UBW or current weight).  Wt hx does support weight loss over past year, although seems to fluctuate d/t fluid/ HD.  Standing scale to standing scale was 179  --    MG 1.5* HEMOLYZED,RECOLLECT REQUESTED  --   --          Recent Labs     02/22/24  1211 02/22/24  1712 02/22/24  2104 02/23/24  0620 02/23/24  1110   POCGLU 84 105 144* 97 96       Lab Results   Component Value Date/Time    LABA1C 5.9 12/14/2023 06:22 AM    LABA1C 6.6 11/02/2023 04:21 AM    LABA1C 6.1 08/28/2023 06:21 PM     12/14/2023 06:22 AM       Ernestina Lu RD  Available via [employer]

## 2024-02-23 NOTE — FLOWSHEET NOTE
CCPD Disconnect: 02/23/24 0751   Vitals   /69   Temp 98.1 °F (36.7 °C)   Pulse 93   Respirations 14   SpO2 99 %   Observations & Evaluations   Level of Consciousness 0   Oriented X 4   Heart Rhythm   (remote monitoring)   Respiratory Quality/Effort Unlabored   O2 Device None (Room air)   Bilateral Breath Sounds Clear   Skin Condition/Temp Warm;Dry   Appetite Nausea   Abdomen Inspection Rounded;Soft   Edema None   Peritoneal Dialysis Catheter Mid lower abdomen   No placement date or time found.   Catheter Location: Mid lower abdomen   Status Deaccessed   Dressing Status Clean, dry & intact   Dressing Gauze   Date of Last Dressing Change 02/22/24   Dialysis Type Continuous cycling   Catheter Care Given Yes  (Alcavis 2 min scrub/soak)   Post-Treatment (Cycler)   Average Dwell Time (Hours:Minutes) 0:40   Lost Dwell Time (Hours:Minutes) 3:18   Effluent Appearance Clear;Yellow   I Drain (mL) 54 mL   PD Output (mL) -42 mL  (idrain 54 + total uf -96)   Volume Gain (mL) 42 mL   Primary RN SBAR: EV Gaona RN  Comments: LOW UF alarm present on arrival to disconnect. Pt repositioned and drain resumed, low UF alarm persists- bypass to end treatment. Specimen collected to send for cell count.

## 2024-02-23 NOTE — CONSULTS
[facility]  DIABETES MANAGEMENT CONSULT    Consulted by Provider for advanced nursing evaluation and care for inpatient blood glucose management.    Evaluation and Action Plan   Galilea Aguilar is a 68 y.o. AA female living with T2D, controlled and ESRD, PD dependant.  Admitted for persistent N/V , and abdominal pain.  Recent admission end of Jan-early Feb for peritonitis.  CT scan shows unchanged right retroperitoneal mass-     2/23: she endorses taking daily low dose Lantus insulin PTA with last dose over 2 days ago.  BG trends since hospital admission in target without need of insulin or  evidence of hypoglycemia. Diet is clears - GI consulted.   Continue with low dose scale correction insulin.  IF BG start to trend >200 consistently, start low dose basal insulin as per below.  Receives nightly PD PTA, and while in patient - Noted with previous admission BG would rise to >250 after 9pm and trend down overnight after PD - Received daily low dose Lantus during that admission without evidence of hypoglcyemia-however BG would dip into lower normal during course of the day.   Would recommend using NPH when PD starts as per below IF BG start to trend >200.    Management Rationale Action Plan   Medication   Basal needs Coverng for dextrose in PDq NPH 5 units QPM with start of PD    Corrective insulin Using LOW sensitivity based on ESRD ACHS   Lab [x] Serum fructosamine     Referral [x] Inpatient nutrition services     Additional orders          Diabetes Discharge Plan   Medication     Referral  []        Outpatient diabetes education   Additional orders            Initial Presentation   Galilea Aguilar is a 68 y.o. female admitted 2/21/24  after experiencing vomiting and left side/flank pain - ESRD-PD dependant. In ED prolonged Qtc .  Recent admission 1/25-2/5 for abd pain/ sepsis secondary to peritonitis and was d/c'd with abx.  Since discharged persistent N/V.      CT: CT demonstrated a large

## 2024-02-23 NOTE — PROGRESS NOTES
[facility]  [address]  [place] [address] [phone]                     GI PROGRESS NOTE    Patient Name: Galilea Aguilar      : 1955      MRN: [id number]  Admit Date: 2024  Today's Date: 2024  CC: N/V    Subjective:   Tolerating CLD, continues with some nausea, no further vomiting.     Objective:     Blood pressure 136/69, pulse 82, temperature 98.1 °F (36.7 °C), resp. rate 14, height 1.72 m (5' 7.72\"), weight 71.4 kg (157 lb 6.5 oz), SpO2 99 %.    Physical Exam:  General appearance: cooperative, no distress, appears stated age  Skin: Extremities and face reveal no rashes. No brooks erythema.   HEENT: Sclerae anicteric.   Cardiovascular: Regular rate and rhythm.  Respiratory: Comfortable breathing with no accessory muscle use.  GI: Abdomen nondistended, soft, and nontender. Normal active bowel sounds.    Rectal: Deferred   Musculoskeletal: No pitting edema of the lower legs.  No costovertebral tenderness.   Neurological: Gross memory appears intact. Patient is alert and oriented.   Psychiatric: Mood appears appropriate with good judgement.  No anxiety or agitation.      Data Review:    Recent Results (from the past 24 hour(s))   POCT Glucose    Collection Time: 24 12:11 PM   Result Value Ref Range    POC Glucose 84 65 - 117 mg/dL    Performed by: Suhail Lucero    Vancomycin Level, Trough    Collection Time: 24  2:05 PM   Result Value Ref Range    Vancomycin Tr 23.1 (HH) 5.0 - 10.0 ug/mL    DOSE DATE NOT PROVIDED      Dose Date/Time NOT PROVIDED      DOSE AMOUNT NOT PROVIDED UNITS   Extra Tubes Hold    Collection Time: 24  2:05 PM   Result Value Ref Range    Specimen HOld 1PST     Comment:        Add-on orders for these samples will be processed based on acceptable specimen integrity and analyte stability, which may vary by analyte.   Vitamin D 25 Hydroxy    Collection Time: 24  2:05 PM   Result Value Ref Range    Vit D, 25-Hydroxy 11.4

## 2024-02-23 NOTE — PLAN OF CARE
Problem: Occupational Therapy - Adult  Goal: By Discharge: Performs self-care activities at highest level of function for planned discharge setting.  See evaluation for individualized goals.  Description: FUNCTIONAL STATUS PRIOR TO ADMISSION:  Patient lives with her daughter who assists with bathing and lower body ADLs PRN, Mod I with RW for functional mobility, receiving  services.    Occupational Therapy Goals:  Initiated 2/23/2024  1.  Patient will perform grooming at the sink with Supervision within 7 day(s).  2.  Patient will perform bathing with Minimal Assist within 7 day(s).  3.  Patient will perform upper and lower body dressing with Supervision & AE PRN within 7 day(s).  4.  Patient will perform toilet transfers with Supervision  within 7 day(s).  5.  Patient will perform all aspects of toileting with Supervision within 7 day(s).  6.  Patient will participate in upper extremity therapeutic exercise/activities with Supervision for 5 minutes within 7 day(s).    7.  Patient will utilize energy conservation techniques during functional activities with verbal and visual cues within 7 day(s).    Outcome: Progressing     OCCUPATIONAL THERAPY EVALUATION    Patient: Galilea Aguilar (68 y.o. female)  Date: 2/23/2024  Primary Diagnosis: Hypomagnesemia [E83.42]  Vomiting [R11.10]  Prolonged Q-T interval on ECG [R94.31]  Intractable nausea and vomiting [R11.2]         Precautions: Contact Precautions, Fall Risk                  ASSESSMENT :  The patient is limited by decreased functional mobility, independence in ADLs, high-level IADLs, ROM, strength, endurance, safety awareness, coordination, balance, distal lower body access, & orthostatic hypotension, as well as L trunk/flank pain s/p admission for sepsis 2* peritonitis. She is Mod I for mobility with RW, receives occasional assist for BADLs from her daughter whom she lives with. She presents slightly below her recent baseline, requiring CGA-Min A for ADLs and OOB  Performance deficits relating to physical, cognitive, or psychosocial skills that result in activity limitations and/or participation restrictions LOW Complexity: No comorbidities that affect functional and  no verbal  or physical assist needed to complete eval tasks   Based on the above components, the patient evaluation is determined to be of the following complexity level: Low

## 2024-02-23 NOTE — FLOWSHEET NOTE
CCPD Connection: 02/23/24 1800   Vitals   BP (!) 128/56   Temp 98.1 °F (36.7 °C)   Pulse 91   Respirations 18   Observations & Evaluations   Level of Consciousness 0   Oriented X 4   Heart Rhythm Regular   Respiratory Quality/Effort Unlabored   O2 Device None (Room air)   Bilateral Breath Sounds Clear   Skin Condition/Temp Warm;Dry   Abdomen Inspection Rounded;Soft   Edema None   Peritoneal Dialysis Catheter Mid lower abdomen   No placement date or time found.   Catheter Location: Mid lower abdomen   Status Accessed   Site Condition Clean, dry, intact   Dressing Status New dressing applied   Dressing Gauze   Date of Last Dressing Change 02/23/24   Dialysis Type Continuous cycling   Exit Site Condition good   Catheter Care Given   (alcavis 2min scrub/soak)   Cycler   Verification of Prescription CCPD   Informed Consent  Yes   Total Volume Programmed 69483 mL   Therapy Time (Hours:Minutes) 9:30  (+ 2hr dwell for specimen collection)   Cycler Type [facility]   Fill Volume 2500 mL   Last Fill Volume 1000 mL  (for specimen collection)   Dextrose Setting Same (Nonextraneal)   I Drain Alarm 700 mL   Number of Cycles 5   Bag Volume 6000 mL   Number of Bags Used 3   Dianeal Solution   (Dextrose 1.5% in 6000 mL)     Hepatitis B Surface Ag   Date/Time Value Ref Range Status   01/28/2024 07:09 AM <0.10 Index Final     Hep B S Ab   Date/Time Value Ref Range Status   01/31/2024 06:50 AM <3.10 mIU/mL Final     Time Out (time): 1820  Primary RN SBAR: CYNTHIA Azul RN  Patient Education: Infection prevention  Comments: Remained present for completion of initial drain and initiation of first fill.    Start time: 1830  Est end time: 0500 (with 2 hr dwell)

## 2024-02-23 NOTE — ED NOTES
RN to bedside, initial pt contact. Pt updated on plan of care and assessed.  No needs at this time.  Call bell within reach

## 2024-02-23 NOTE — PROGRESS NOTES
data recorded.  No intake/output data recorded.    Physical Examination:  General: NAD,Conversant   Neck:  Supple, no mass  Resp:  Lungs CTA B/L, no wheezing , normal respiratory effort  CV:  Regular Rate/ Rhythm,  no murmur or rub,no  LE edema  GI:  Soft, NT, PD exit site dressing D/I.  Neurologic:  Non focal  Psych:             AAO x 3 appropriate affect   Skin:  No Rash      LABS:  Recent Labs     02/23/24  0603 02/21/24 2012 02/05/24  0405 02/04/24  0533 02/03/24  0311 01/31/24  0650 01/30/24  0710   * 131* 140 140 139   < > 138   K HEMOLYZED,RECOLLECT REQUESTED 3.0* 5.2* 5.6* 4.8   < > 3.1*    95* 104 104 104   < > 100   CO2 26 29 29 31 29   < > 27   BUN 14 10 13 14 14   < > PLEASE DISREGARD RESULTS   CREATININE 5.29* 5.43* 4.94* 4.97* 4.86*   < > 4.73*   CALCIUM 6.9* 7.1* 6.9* 6.8* 7.5*   < > 7.2*   LABALBU  --  1.6* 1.7* 1.6* 1.4*   < > 1.6*   PHOS  --  2.3*  --  7.0* 6.4*   < > 1.8*   MG HEMOLYZED,RECOLLECT REQUESTED 1.5*  --   --   --   --  1.6    < > = values in this interval not displayed.       Recent Labs     02/23/24  0603 02/21/24 2012 02/05/24  0405   WBC 6.4 7.5 6.4   HGB 10.0* 11.1* 9.3*   HCT 31.1* 34.2* 31.0*    248 183       No results for input(s): \"WINSTON\", \"KU\", \"CLU\", \"CREAU\" in the last 720 hours.    Invalid input(s): \"PROU\"  No results found for: \"SDES\"  No components found for: \"CULT\"      Review of Medical Records: I have reviewed all pertinent labs, chart notes, microbiology data, radiology imaging this patient.  Medications list personally reviewed.  No change in PMH ,family and social history from Consult note.      DAYA Cao NP  [facility] [address], [facility] [place] [address]  Phone: [phone]   [phone]

## 2024-02-23 NOTE — CARE COORDINATION
Care Management Initial Assessment       RUR: 33%  Readmission? Yes - 2/21/24  1st IM letter given? Yes - 2/23/23  1st  letter given: No    PLEASE NOTE--Encounter / Re-Admission Within 30 Days  This patient has had another encounter or admission within the last 30 days.     Patient in agreement with PT/OT recommendations for HHPT/OT.  Says that she is currently provided services by [facility].  CM sent referral via [employer] for [facility].   Referral #[id number]        02/23/24 0855   Readmission Assessment   Number of Days since last admission? 8-30 days   Previous Disposition Home with Family   Who is being Interviewed   (completed from patient records)   Did you visit your Primary Care Physician after you left the hospital, before you returned this time? No   Why weren't you able to visit your PCP? Did not have an appointment   Did you see a specialist, such as Cardiac, Pulmonary, Orthopedic Physician, etc. after you left the hospital? No   Who advised the patient to return to the hospital? Physician   Does the patient report anything that got in the way of taking their medications? No   In our efforts to provide the best possible care to you and others like you, can you think of anything that we could have done to help you after you left the hospital the first time, so that you might not have needed to return so soon? Discharge instructions that are concise, clear, and non contradictory;Teaching during hospitalization regarding your illness;Teach back instructions regarding management of illness;Improved written discharge instructions;Identify patient's health literacy needs;Education on how to continue taking medications upon discharge       Radha Buckner RN, BSN, BSHCM  Case Management [facility]

## 2024-02-23 NOTE — ED NOTES
ED TO INPATIENT SBAR HANDOFF    Patient Name: Galilea Aguilar   Preferred Name: Galilea  : 1955  68 y.o.   Family/Caregiver Present: no   Code Status Order: Full Code  PO Status: NPO:No  Telemetry Order:   C-SSRS: Risk of Suicide: No Risk  Sitter no     Restraints:     Sepsis Risk Score Sepsis Risk Score: 2.69    Situation  Chief Complaint   Patient presents with    Emesis     Brief Description of Patient's Condition: stable receiving her nightly PD  Mental Status: alert  Arrived from:Home  Imaging:   CT ABDOMEN PELVIS WO CONTRAST Additional Contrast? None   Final Result      1. No evidence for renal pelvocaliectasis or ureteral dilation. Right renal   cysts again shown.   2. Large right-sided retroperitoneal mass post primarily of fat though with the   internal strand-like opacifications. Findings consistent with fat-containing   neoplasm. Diagnostic considerations include low-grade liposarcoma, atypical   lipomatous tumor, and lipoma. 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 Findings   demonstrated previously appears similar   3. Gallstones and gallbladder sludge again demonstrated.   4. Calcified retroperitoneal lymph nodes again demonstrated.   6. Peroneal dialysis catheter. Moderate free intraperineal fluid.   7. See report for details.      XR CHEST (2 VW)   Final Result      Left basilar atelectasis and small pleural effusion.           Abnormal labs:   Abnormal Labs Reviewed   CBC WITH AUTO DIFFERENTIAL - Abnormal; Notable for the following components:       Result Value    Hemoglobin 11.1 (*)     Hematocrit 34.2 (*)     RDW 18.2 (*)     All other components within normal limits   COMPREHENSIVE METABOLIC PANEL - Abnormal; Notable for the following components:    Sodium 131 (*)     Potassium 3.0 (*)     Chloride 95 (*)     Glucose 141 (*)     Creatinine 5.43 (*)     Bun/Cre Ratio 2 (*)     Est, Glom Filt Rate 8 (*)     Calcium 7.1 (*)     Alk Phosphatase 154 (*)     Albumin 1.6 (*)     Globulin 5.8 (*)

## 2024-02-23 NOTE — PROGRESS NOTES
Hospitalist Progress Note  Maya Guzman MD  Answering service: [phone] OR [phone] from in house phone        Date of Service:  2024  NAME:  Galilea Aguilar  :  1955  MRN:  [id number]      Admission Summary:   HPI: \"Galilea Aguilar is a 68 y.o. female with a history of CAD, COPD, CVA, diabetes, ESRD on peritoneal dialysis who presented to the ED with chief complaint of vomiting and left side/flank pain. In the ED, labs consistent with known renal dysfunction. QTc was found to be prolonged (554). CT demonstrated a large right retroperitoneal mass though has been present on several CT scans since . Patient treated with IV Valium for nausea and referred to hospitalists for further management.      Patient was seen and examined this morning in the ED, she was lying in bed in no acute distress -though did notice her having \"dry heaves\". Patient was recently admitted from  - 2024 for generalized abdominal pain, diagnosed with severe sepsis secondary to peritonitis and was discharged on vancomycin to be given through her peritoneal dialysis fluid and has also been prescribed Diflucan daily for the next month while on antibiotics.  She reports that since she has been discharged, she has had nausea and vomiting that has been persistent and has had difficulty keeping anything down.  Denies any headaches or dizziness, no chest pain or pressure, no shortness of breath or coughing.  Reports no diarrhea or constipation at home and has no abdominal pain.       Medication reconciliation completed with patient at bedside.      Addendum 1540:   - Tylenol not sufficient  for left sided flank discomfort  - give low dose morphine as she is having difficulty tolerating po. Was given 4 mg last night.  - add compazine as alternate nausea mediction\"       Interval history / Subjective:   Patient seen examined at bedside  (GARAMYCIN) 0.1 % cream   Topical PRN    prochlorperazine (COMPAZINE) injection 5 mg  5 mg IntraVENous Q6H PRN    morphine (PF) injection 1 mg  1 mg IntraVENous Q4H PRN    scopolamine (TRANSDERM-SCOP) transdermal patch 1 patch  1 patch TransDERmal Q72H     ______________________________________________________________________  EXPECTED LENGTH OF STAY: Unable to retrieve estimated LOS  ACTUAL LENGTH OF STAY:          1                 Maya Guzman MD

## 2024-02-24 LAB
ALBUMIN SERPL-MCNC: 1.3 G/DL (ref 3.5–5)
ANION GAP SERPL CALC-SCNC: 6 MMOL/L (ref 5–15)
BASOPHILS # BLD: 0.1 K/UL (ref 0–0.1)
BASOPHILS NFR BLD: 2 % (ref 0–1)
BUN SERPL-MCNC: 14 MG/DL (ref 6–20)
BUN/CREAT SERPL: 3 (ref 12–20)
CALCIUM SERPL-MCNC: 7.1 MG/DL (ref 8.5–10.1)
CHLORIDE SERPL-SCNC: 99 MMOL/L (ref 97–108)
CO2 SERPL-SCNC: 28 MMOL/L (ref 21–32)
CREAT SERPL-MCNC: 5.04 MG/DL (ref 0.55–1.02)
DIFFERENTIAL METHOD BLD: ABNORMAL
EOSINOPHIL # BLD: 0.3 K/UL (ref 0–0.4)
EOSINOPHIL NFR BLD: 5 % (ref 0–7)
ERYTHROCYTE [DISTWIDTH] IN BLOOD BY AUTOMATED COUNT: 17.6 % (ref 11.5–14.5)
GLUCOSE BLD STRIP.AUTO-MCNC: 116 MG/DL (ref 65–117)
GLUCOSE BLD STRIP.AUTO-MCNC: 124 MG/DL (ref 65–117)
GLUCOSE BLD STRIP.AUTO-MCNC: 133 MG/DL (ref 65–117)
GLUCOSE BLD STRIP.AUTO-MCNC: 150 MG/DL (ref 65–117)
GLUCOSE SERPL-MCNC: 132 MG/DL (ref 65–100)
HCT VFR BLD AUTO: 27.9 % (ref 35–47)
HGB BLD-MCNC: 8.7 G/DL (ref 11.5–16)
IMM GRANULOCYTES # BLD AUTO: 0 K/UL (ref 0–0.04)
IMM GRANULOCYTES NFR BLD AUTO: 0 % (ref 0–0.5)
LYMPHOCYTES # BLD: 2 K/UL (ref 0.8–3.5)
LYMPHOCYTES NFR BLD: 31 % (ref 12–49)
MAGNESIUM SERPL-MCNC: 1.7 MG/DL (ref 1.6–2.4)
MCH RBC QN AUTO: 27.9 PG (ref 26–34)
MCHC RBC AUTO-ENTMCNC: 31.2 G/DL (ref 30–36.5)
MCV RBC AUTO: 89.4 FL (ref 80–99)
MONOCYTES # BLD: 0.6 K/UL (ref 0–1)
MONOCYTES NFR BLD: 10 % (ref 5–13)
NEUTS SEG # BLD: 3.5 K/UL (ref 1.8–8)
NEUTS SEG NFR BLD: 52 % (ref 32–75)
NRBC # BLD: 0 K/UL (ref 0–0.01)
NRBC BLD-RTO: 0 PER 100 WBC
PHOSPHATE SERPL-MCNC: 1.9 MG/DL (ref 2.6–4.7)
PLATELET # BLD AUTO: 180 K/UL (ref 150–400)
PMV BLD AUTO: 10.7 FL (ref 8.9–12.9)
POTASSIUM SERPL-SCNC: 2.6 MMOL/L (ref 3.5–5.1)
RBC # BLD AUTO: 3.12 M/UL (ref 3.8–5.2)
SERVICE CMNT-IMP: ABNORMAL
SERVICE CMNT-IMP: NORMAL
SODIUM SERPL-SCNC: 133 MMOL/L (ref 136–145)
VANCOMYCIN SERPL-MCNC: 19.6 UG/ML
WBC # BLD AUTO: 6.5 K/UL (ref 3.6–11)

## 2024-02-24 PROCEDURE — 83735 ASSAY OF MAGNESIUM: CPT

## 2024-02-24 PROCEDURE — 2060000000 HC ICU INTERMEDIATE R&B

## 2024-02-24 PROCEDURE — 80202 ASSAY OF VANCOMYCIN: CPT

## 2024-02-24 PROCEDURE — 82985 ASSAY OF GLYCATED PROTEIN: CPT

## 2024-02-24 PROCEDURE — 6360000002 HC RX W HCPCS: Performed by: NURSE PRACTITIONER

## 2024-02-24 PROCEDURE — 6360000002 HC RX W HCPCS: Performed by: STUDENT IN AN ORGANIZED HEALTH CARE EDUCATION/TRAINING PROGRAM

## 2024-02-24 PROCEDURE — 90945 DIALYSIS ONE EVALUATION: CPT

## 2024-02-24 PROCEDURE — 80069 RENAL FUNCTION PANEL: CPT

## 2024-02-24 PROCEDURE — 85025 COMPLETE CBC W/AUTO DIFF WBC: CPT

## 2024-02-24 PROCEDURE — 94640 AIRWAY INHALATION TREATMENT: CPT

## 2024-02-24 PROCEDURE — 36415 COLL VENOUS BLD VENIPUNCTURE: CPT

## 2024-02-24 PROCEDURE — 2580000003 HC RX 258: Performed by: NURSE PRACTITIONER

## 2024-02-24 PROCEDURE — 82962 GLUCOSE BLOOD TEST: CPT

## 2024-02-24 PROCEDURE — 6370000000 HC RX 637 (ALT 250 FOR IP): Performed by: NURSE PRACTITIONER

## 2024-02-24 PROCEDURE — 99222 1ST HOSP IP/OBS MODERATE 55: CPT | Performed by: INTERNAL MEDICINE

## 2024-02-24 PROCEDURE — 6370000000 HC RX 637 (ALT 250 FOR IP): Performed by: STUDENT IN AN ORGANIZED HEALTH CARE EDUCATION/TRAINING PROGRAM

## 2024-02-24 PROCEDURE — 6370000000 HC RX 637 (ALT 250 FOR IP): Performed by: INTERNAL MEDICINE

## 2024-02-24 RX ORDER — POTASSIUM CHLORIDE 750 MG/1
40 TABLET, FILM COATED, EXTENDED RELEASE ORAL ONCE
Status: COMPLETED | OUTPATIENT
Start: 2024-02-24 | End: 2024-02-24

## 2024-02-24 RX ORDER — MAGNESIUM SULFATE IN WATER 40 MG/ML
2000 INJECTION, SOLUTION INTRAVENOUS ONCE
Status: COMPLETED | OUTPATIENT
Start: 2024-02-24 | End: 2024-02-24

## 2024-02-24 RX ORDER — DRONABINOL 2.5 MG/1
2.5 CAPSULE ORAL 2 TIMES DAILY
Status: DISCONTINUED | OUTPATIENT
Start: 2024-02-24 | End: 2024-02-26

## 2024-02-24 RX ADMIN — POTASSIUM & SODIUM PHOSPHATES POWDER PACK 280-160-250 MG 500 MG: 280-160-250 PACK at 13:52

## 2024-02-24 RX ADMIN — ARFORMOTEROL TARTRATE: 15 SOLUTION RESPIRATORY (INHALATION) at 22:22

## 2024-02-24 RX ADMIN — MONTELUKAST 10 MG: 10 TABLET, FILM COATED ORAL at 21:00

## 2024-02-24 RX ADMIN — POTASSIUM BICARBONATE 40 MEQ: 782 TABLET, EFFERVESCENT ORAL at 12:29

## 2024-02-24 RX ADMIN — DULOXETINE HYDROCHLORIDE 20 MG: 20 CAPSULE, DELAYED RELEASE ORAL at 21:00

## 2024-02-24 RX ADMIN — DRONABINOL 2.5 MG: 2.5 CAPSULE ORAL at 10:13

## 2024-02-24 RX ADMIN — CARVEDILOL 6.25 MG: 6.25 TABLET, FILM COATED ORAL at 08:22

## 2024-02-24 RX ADMIN — POTASSIUM BICARBONATE 40 MEQ: 782 TABLET, EFFERVESCENT ORAL at 08:21

## 2024-02-24 RX ADMIN — ASPIRIN 81 MG CHEWABLE TABLET 81 MG: 81 TABLET CHEWABLE at 08:21

## 2024-02-24 RX ADMIN — SODIUM CHLORIDE, SODIUM LACTATE, CALCIUM CHLORIDE, MAGNESIUM CHLORIDE AND DEXTROSE 6000 ML: 1.5; 538; 448; 18.3; 5.08 INJECTION, SOLUTION INTRAPERITONEAL at 21:00

## 2024-02-24 RX ADMIN — POTASSIUM & SODIUM PHOSPHATES POWDER PACK 280-160-250 MG 500 MG: 280-160-250 PACK at 16:32

## 2024-02-24 RX ADMIN — CARVEDILOL 6.25 MG: 6.25 TABLET, FILM COATED ORAL at 16:32

## 2024-02-24 RX ADMIN — APIXABAN 2.5 MG: 2.5 TABLET, FILM COATED ORAL at 08:22

## 2024-02-24 RX ADMIN — Medication 10 ML: at 08:34

## 2024-02-24 RX ADMIN — GENTAMICIN SULFATE: 1 CREAM TOPICAL at 21:00

## 2024-02-24 RX ADMIN — ARFORMOTEROL TARTRATE: 15 SOLUTION RESPIRATORY (INHALATION) at 08:05

## 2024-02-24 RX ADMIN — MAGNESIUM SULFATE HEPTAHYDRATE 2000 MG: 40 INJECTION, SOLUTION INTRAVENOUS at 16:31

## 2024-02-24 RX ADMIN — DRONABINOL 2.5 MG: 2.5 CAPSULE ORAL at 21:00

## 2024-02-24 RX ADMIN — APIXABAN 2.5 MG: 2.5 TABLET, FILM COATED ORAL at 21:00

## 2024-02-24 RX ADMIN — DULOXETINE HYDROCHLORIDE 20 MG: 20 CAPSULE, DELAYED RELEASE ORAL at 08:22

## 2024-02-24 RX ADMIN — Medication 10 ML: at 21:00

## 2024-02-24 RX ADMIN — POTASSIUM CHLORIDE 40 MEQ: 750 TABLET, EXTENDED RELEASE ORAL at 13:53

## 2024-02-24 RX ADMIN — POTASSIUM CHLORIDE 40 MEQ: 750 TABLET, EXTENDED RELEASE ORAL at 19:13

## 2024-02-24 NOTE — PROGRESS NOTES
earlier 1 episode of emesis still with positive nausea     Assessment & Plan:      Nausea and Committing with recent Peritonitis  Retroperitoneal Mass  -Appreciate GI's thorough eval has had chronic nausea vomiting CT abdomen no acute pathology has had unremarkable EGD October 2023 question if Trulicity is possible culture given no gastroparesis  - Hydrate due to N/V  - Clear Liquid diet, will trial Marinol, on scopolamine patch  - GI consulted for persistent N/V since discharge several weeks ago - no hematemesis, prn antiemetics , advance diet per them  -Will consult diabetic management team to come up with alternative for diabetic regimen     Prolonged Qtc  - holding fluconazole  - monitor on tele     Hyponatremia  Hypokalemia  Hypomagnesemia  - replete with /supplemental K per nephro patient on PD      Hx ESRD on PD  Recent history of peritonitis and on antibiotics PTA  - no fevers, no leukocytosis  - nephrology has been consulted and seen - to re-order PD fluid and plan to sample abdominal dwell fluid  - Patient was on vancomycin and fluconazole prior to admission, discussed with np from nephrology started IV vanco through HD first dose 1/26 was supposed to complete ~4 weeks of treatments tentative end date 2/25, don't see notes from ID confirming duration, will consult ID on re-evaluating for duration of time to to cont      Hx CAD  Hx CVA  History of atrial fibrillation  -Resume home meds     Hx Diabetes  - holding home lantus for now due to compromised intakes  - resume prn  - SSI ordered     PTOT       Code status: full   Prophylaxis: scd  Care Plan discussed with: Patient/family, nurse, CM  Anticipated Disposition: Greater than 48 hours     Principal Problem:    Vomiting  Active Problems:    Intractable nausea and vomiting    Type 2 diabetes with nephropathy (HCC)  Resolved Problems:    * No resolved hospital problems. *            Review of Systems:   Pertinent items are noted in HPI.         Vital Signs:     0.9 % sodium chloride infusion   IntraVENous PRN    polyethylene glycol (GLYCOLAX) packet 17 g  17 g Oral Daily PRN    acetaminophen (TYLENOL) tablet 650 mg  650 mg Oral Q6H PRN    Or    acetaminophen (TYLENOL) suppository 650 mg  650 mg Rectal Q6H PRN    dianeal lo-brandi 1.5% 6,000 mL  6,000 mL IntraPERitoneal Daily    dianeal lo-brandi 1.5% 6,000 mL  6,000 mL IntraPERitoneal Daily    dianeal lo-brandi 1.5% 6,000 mL  6,000 mL IntraPERitoneal Daily    phenylephrine-mineral oil-petrolatum (PREPARATION H) rectal ointment   Rectal BID PRN    dextrose bolus 10% 125 mL  125 mL IntraVENous PRN    Or    dextrose bolus 10% 250 mL  250 mL IntraVENous PRN    glucagon injection 1 mg  1 mg SubCUTAneous PRN    dextrose 10 % infusion   IntraVENous Continuous PRN    insulin lispro (HUMALOG) injection vial 0-4 Units  0-4 Units SubCUTAneous TID WC    insulin lispro (HUMALOG) injection vial 0-4 Units  0-4 Units SubCUTAneous Nightly    gentamicin (GARAMYCIN) 0.1 % cream   Topical PRN    prochlorperazine (COMPAZINE) injection 5 mg  5 mg IntraVENous Q6H PRN    morphine (PF) injection 1 mg  1 mg IntraVENous Q4H PRN    scopolamine (TRANSDERM-SCOP) transdermal patch 1 patch  1 patch TransDERmal Q72H     ______________________________________________________________________  EXPECTED LENGTH OF STAY: Unable to retrieve estimated LOS  ACTUAL LENGTH OF STAY:          2                 Maya Guzman MD

## 2024-02-24 NOTE — FLOWSHEET NOTE
CCPD Disconnect: 02/24/24 0930   Observations & Evaluations   Level of Consciousness 0   Oriented X 4   Heart Rhythm Regular   Respiratory Quality/Effort Unlabored   O2 Device None (Room air)   Bilateral Breath Sounds Clear   Skin Condition/Temp Warm;Dry   Appetite Fair   Abdomen Inspection Rounded;Soft   Edema None   Peritoneal Dialysis Catheter Mid lower abdomen   No placement date or time found.   Catheter Location: Mid lower abdomen   Status Deaccessed   Dressing Status Clean, dry & intact   Dressing Gauze   Date of Last Dressing Change 02/23/24   Dialysis Type Continuous cycling   Catheter Care Given Yes  (alcavis 2min scrub/soak)   Post-Treatment (Cycler)   Average Dwell Time (Hours:Minutes) 0:38   Lost Dwell Time (Hours:Minutes) 3:30   Effluent Appearance Clear;Yellow   I Drain (mL) 2 mL   PD Output (mL) -90 mL  (idrain 2 + man drain 1306 + total UF -398 - last fill 1000)   Volume Gain (mL) 90 mL   Primary RN SBAR: DEBBIE England, RN  Comments: LOW UF alarms noted during overnight treatment resulting in lost dwell 3:30. Vanc level 19.6, per pharmacy hold IP vanc today and reassess in AM.

## 2024-02-24 NOTE — PROGRESS NOTES
[facility]      Nephrology  Note  Galilea Aguilar  Date of Admission : 2/21/2024    CC:  Follow up for ESRD       Assessment and Plan     ESRD on PD followed by Dr. Chiu at the [facility]  - CCPD outpatient prescription: 11324 ml 2.5% solutions 2300 ml  1hr. 25 min dwell time x 4 with last fill extraneal 1000 cc  - Prescription in hospital: CCPD 22132 ml 1.5% solution x 5 exchanges 2500 cc        Prior Peritonitis 1/26 with (+) Blood cultures 1/25  - Vanc Level 23.1 plan for daily Vanc level  - on IVP Vanc outpatient last dose 2/20/24- hold IP vanc today  - Repeat Cell count 69    HYPOKALEMIA  -  MEQ PO-- SHE IS requesting KCL tab instead of powder    Hypophosphatemia  - neutraphos po    Abdominal pain with vomiting  - CT showing gall stones with sludge, right retroperitoneal mass consistent with Lipoma that is unchanged from prior study. Renal cysts  - GI has been consulted recommend stopping Trulicity/ Scopolamine patch    Anemia of CKD  - Hgb stable no need for LIS    MBD of CKD  - corcal stable 8.5  - check ionized Ca  - Phos low-no binders liberalize phos in diet      Hypoalbuminemia  - recommend protein supplement   - dietary consulted    Prolonged QT interval   HTN   DM  COPD  CAD         Interval History:  Bp stable  K and phos low    Current Medications: all current  Medications have been eviewed in EPIC  Review of Systems: Pertinent items are noted in HPI.    Objective:  Vitals:    Vitals:    02/24/24 0807 02/24/24 1000 02/24/24 1200 02/24/24 1216   BP: 131/71   (!) 148/82   Pulse: 92 89 90 92   Resp: 18   20   Temp: 98.1 °F (36.7 °C)   97.9 °F (36.6 °C)   TempSrc: Oral   Oral   SpO2: 97%   99%   Weight:       Height:         Intake and Output:  02/24 0701 - 02/24 1900  In: 90   Out: -90   02/22 1901 - 02/24 0700  In: 42   Out: -42     Physical Examination:  GEN:  NAD  NECK:  Supple, no thyromegaly  RESP: Clear  b/l, no  wheezing,   CVS: RRR,S1,S2   NEURO: non focal,

## 2024-02-24 NOTE — PLAN OF CARE
Problem: Discharge Planning  Goal: Discharge to home or other facility with appropriate resources  Outcome: Progressing  Flowsheets (Taken 2/24/2024 9322)  Discharge to home or other facility with appropriate resources: Identify barriers to discharge with patient and caregiver     Problem: Pain  Goal: Verbalizes/displays adequate comfort level or baseline comfort level  Outcome: Progressing  Flowsheets (Taken 2/24/2024 3450 by Filomena Gaona RN)  Verbalizes/displays adequate comfort level or baseline comfort level: Encourage patient to monitor pain and request assistance

## 2024-02-24 NOTE — CONSULTS
WBC,70 monocytes  12 PMN    Pathology Results:    Imaging:     CT abdomen 02/21/24  Retroperitoneum with large fat-containing mass again demonstrated in the right following the course of the psoas muscle/iliac muscle to right inguinal canal  Intermediate stranding measures 8.1 x 8.5 x 19 cm   abundant vascular calcifications/calcified lymph nodes retroperitoneum  Suggestion of low-grade liposarcoma    CXR 02/21/24  Left basilar atelectasis with small pleural effusion    Procedures:     Assessment / Plan:         1.  Large retroperitoneal mass 8.1x 8.5 x 19 cm following course of psoas/iliac muscle        Radiologic suggestion of low-grade liposarcoma?   2.   History of peritonitis with suppressive antibiotics with cefepime/vancomycin 01/24  3.   Concern for infection and peritoneal dialysis patient with past peritonitis  4.   Consider MRI/CT with contrast for further definition        IR biopsy with pathology and culture if possible  5.   Patient currently afebrile without leukocytosis with abdominal pain nausea  6.   Obtain inflammatory markers with CRP/ESR/LDH and ACE level                  Thank for the opportunity to participate in the care of this patient. Please contact with questions or concerns.

## 2024-02-25 ENCOUNTER — APPOINTMENT (OUTPATIENT)
Facility: HOSPITAL | Age: 69
End: 2024-02-25
Payer: MEDICARE

## 2024-02-25 LAB
ALBUMIN SERPL-MCNC: 1.4 G/DL (ref 3.5–5)
ANION GAP SERPL CALC-SCNC: 4 MMOL/L (ref 5–15)
BASOPHILS # BLD: 0.1 K/UL (ref 0–0.1)
BASOPHILS NFR BLD: 1 % (ref 0–1)
BUN SERPL-MCNC: 15 MG/DL (ref 6–20)
BUN/CREAT SERPL: 3 (ref 12–20)
CALCIUM SERPL-MCNC: 6.9 MG/DL (ref 8.5–10.1)
CHLORIDE SERPL-SCNC: 101 MMOL/L (ref 97–108)
CO2 SERPL-SCNC: 27 MMOL/L (ref 21–32)
CREAT SERPL-MCNC: 5.38 MG/DL (ref 0.55–1.02)
DIFFERENTIAL METHOD BLD: ABNORMAL
EKG ATRIAL RATE: 88 BPM
EKG ATRIAL RATE: 88 BPM
EKG DIAGNOSIS: NORMAL
EKG DIAGNOSIS: NORMAL
EKG P AXIS: 42 DEGREES
EKG P AXIS: 71 DEGREES
EKG P-R INTERVAL: 164 MS
EKG P-R INTERVAL: 198 MS
EKG Q-T INTERVAL: 458 MS
EKG Q-T INTERVAL: 614 MS
EKG QRS DURATION: 86 MS
EKG QRS DURATION: 94 MS
EKG QTC CALCULATION (BAZETT): 554 MS
EKG QTC CALCULATION (BAZETT): 742 MS
EKG R AXIS: -33 DEGREES
EKG R AXIS: -6 DEGREES
EKG T AXIS: 126 DEGREES
EKG T AXIS: 84 DEGREES
EKG VENTRICULAR RATE: 88 BPM
EKG VENTRICULAR RATE: 88 BPM
EOSINOPHIL # BLD: 0.2 K/UL (ref 0–0.4)
EOSINOPHIL NFR BLD: 2 % (ref 0–7)
ERYTHROCYTE [DISTWIDTH] IN BLOOD BY AUTOMATED COUNT: 17.7 % (ref 11.5–14.5)
FRUCTOSAMINE SERPL-SCNC: 261 UMOL/L (ref 0–285)
GLUCOSE BLD STRIP.AUTO-MCNC: 134 MG/DL (ref 65–117)
GLUCOSE BLD STRIP.AUTO-MCNC: 163 MG/DL (ref 65–117)
GLUCOSE BLD STRIP.AUTO-MCNC: 167 MG/DL (ref 65–117)
GLUCOSE BLD STRIP.AUTO-MCNC: 192 MG/DL (ref 65–117)
GLUCOSE SERPL-MCNC: 128 MG/DL (ref 65–100)
HCT VFR BLD AUTO: 28.2 % (ref 35–47)
HGB BLD-MCNC: 8.9 G/DL (ref 11.5–16)
IMM GRANULOCYTES # BLD AUTO: 0 K/UL (ref 0–0.04)
IMM GRANULOCYTES NFR BLD AUTO: 0 % (ref 0–0.5)
LYMPHOCYTES # BLD: 1.6 K/UL (ref 0.8–3.5)
LYMPHOCYTES NFR BLD: 21 % (ref 12–49)
MCH RBC QN AUTO: 28.3 PG (ref 26–34)
MCHC RBC AUTO-ENTMCNC: 31.6 G/DL (ref 30–36.5)
MCV RBC AUTO: 89.8 FL (ref 80–99)
MONOCYTES # BLD: 0.8 K/UL (ref 0–1)
MONOCYTES NFR BLD: 11 % (ref 5–13)
NEUTS SEG # BLD: 4.7 K/UL (ref 1.8–8)
NEUTS SEG NFR BLD: 65 % (ref 32–75)
NRBC # BLD: 0 K/UL (ref 0–0.01)
NRBC BLD-RTO: 0 PER 100 WBC
PHOSPHATE SERPL-MCNC: 2.1 MG/DL (ref 2.6–4.7)
PLATELET # BLD AUTO: 152 K/UL (ref 150–400)
PMV BLD AUTO: 9.8 FL (ref 8.9–12.9)
POTASSIUM SERPL-SCNC: 4.5 MMOL/L (ref 3.5–5.1)
RBC # BLD AUTO: 3.14 M/UL (ref 3.8–5.2)
SERVICE CMNT-IMP: ABNORMAL
SODIUM SERPL-SCNC: 132 MMOL/L (ref 136–145)
VANCOMYCIN SERPL-MCNC: 17.8 UG/ML
WBC # BLD AUTO: 7.3 K/UL (ref 3.6–11)

## 2024-02-25 PROCEDURE — 80069 RENAL FUNCTION PANEL: CPT

## 2024-02-25 PROCEDURE — 80202 ASSAY OF VANCOMYCIN: CPT

## 2024-02-25 PROCEDURE — 6370000000 HC RX 637 (ALT 250 FOR IP): Performed by: NURSE PRACTITIONER

## 2024-02-25 PROCEDURE — 2580000003 HC RX 258: Performed by: NURSE PRACTITIONER

## 2024-02-25 PROCEDURE — 85025 COMPLETE CBC W/AUTO DIFF WBC: CPT

## 2024-02-25 PROCEDURE — 6360000002 HC RX W HCPCS: Performed by: NURSE PRACTITIONER

## 2024-02-25 PROCEDURE — 2060000000 HC ICU INTERMEDIATE R&B

## 2024-02-25 PROCEDURE — 93010 ELECTROCARDIOGRAM REPORT: CPT | Performed by: SPECIALIST

## 2024-02-25 PROCEDURE — 94640 AIRWAY INHALATION TREATMENT: CPT

## 2024-02-25 PROCEDURE — 6370000000 HC RX 637 (ALT 250 FOR IP): Performed by: STUDENT IN AN ORGANIZED HEALTH CARE EDUCATION/TRAINING PROGRAM

## 2024-02-25 PROCEDURE — 6370000000 HC RX 637 (ALT 250 FOR IP)

## 2024-02-25 PROCEDURE — 70450 CT HEAD/BRAIN W/O DYE: CPT

## 2024-02-25 PROCEDURE — 82962 GLUCOSE BLOOD TEST: CPT

## 2024-02-25 PROCEDURE — 36415 COLL VENOUS BLD VENIPUNCTURE: CPT

## 2024-02-25 RX ADMIN — SODIUM CHLORIDE, SODIUM LACTATE, CALCIUM CHLORIDE, MAGNESIUM CHLORIDE AND DEXTROSE 6000 ML: 1.5; 538; 448; 18.3; 5.08 INJECTION, SOLUTION INTRAPERITONEAL at 15:52

## 2024-02-25 RX ADMIN — CARVEDILOL 6.25 MG: 6.25 TABLET, FILM COATED ORAL at 17:13

## 2024-02-25 RX ADMIN — GENTAMICIN SULFATE: 1 CREAM TOPICAL at 15:53

## 2024-02-25 RX ADMIN — PROCHLORPERAZINE EDISYLATE 5 MG: 5 INJECTION INTRAMUSCULAR; INTRAVENOUS at 20:23

## 2024-02-25 RX ADMIN — APIXABAN 2.5 MG: 2.5 TABLET, FILM COATED ORAL at 09:26

## 2024-02-25 RX ADMIN — Medication 10 ML: at 09:29

## 2024-02-25 RX ADMIN — SODIUM CHLORIDE, SODIUM LACTATE, CALCIUM CHLORIDE, MAGNESIUM CHLORIDE AND DEXTROSE 6000 ML: 1.5; 538; 448; 18.3; 5.08 INJECTION, SOLUTION INTRAPERITONEAL at 15:51

## 2024-02-25 RX ADMIN — DULOXETINE HYDROCHLORIDE 20 MG: 20 CAPSULE, DELAYED RELEASE ORAL at 09:26

## 2024-02-25 RX ADMIN — ARFORMOTEROL TARTRATE: 15 SOLUTION RESPIRATORY (INHALATION) at 08:27

## 2024-02-25 RX ADMIN — DRONABINOL 2.5 MG: 2.5 CAPSULE ORAL at 09:26

## 2024-02-25 RX ADMIN — ASPIRIN 81 MG CHEWABLE TABLET 81 MG: 81 TABLET CHEWABLE at 09:26

## 2024-02-25 RX ADMIN — CARVEDILOL 6.25 MG: 6.25 TABLET, FILM COATED ORAL at 09:26

## 2024-02-25 RX ADMIN — APIXABAN 2.5 MG: 2.5 TABLET, FILM COATED ORAL at 21:44

## 2024-02-25 RX ADMIN — DULOXETINE HYDROCHLORIDE 20 MG: 20 CAPSULE, DELAYED RELEASE ORAL at 21:44

## 2024-02-25 RX ADMIN — MORPHINE SULFATE 1 MG: 2 INJECTION, SOLUTION INTRAMUSCULAR; INTRAVENOUS at 01:27

## 2024-02-25 RX ADMIN — Medication 10 ML: at 20:23

## 2024-02-25 RX ADMIN — MONTELUKAST 10 MG: 10 TABLET, FILM COATED ORAL at 21:44

## 2024-02-25 RX ADMIN — ARFORMOTEROL TARTRATE: 15 SOLUTION RESPIRATORY (INHALATION) at 22:25

## 2024-02-25 ASSESSMENT — PAIN SCALES - GENERAL
PAINLEVEL_OUTOF10: 0
PAINLEVEL_OUTOF10: 8
PAINLEVEL_OUTOF10: 8

## 2024-02-25 ASSESSMENT — PAIN DESCRIPTION - LOCATION
LOCATION: ABDOMEN
LOCATION: ABDOMEN

## 2024-02-25 ASSESSMENT — PAIN DESCRIPTION - ORIENTATION
ORIENTATION: LEFT
ORIENTATION: LEFT

## 2024-02-25 ASSESSMENT — PAIN - FUNCTIONAL ASSESSMENT: PAIN_FUNCTIONAL_ASSESSMENT: ACTIVITIES ARE NOT PREVENTED

## 2024-02-25 ASSESSMENT — PAIN DESCRIPTION - DESCRIPTORS: DESCRIPTORS: PATIENT UNABLE TO DESCRIBE

## 2024-02-25 NOTE — FLOWSHEET NOTE
CCPD Disconnect: 02/25/24 0759   Vitals   /66   Temp 98.1 °F (36.7 °C)   Temp Source Oral   Pulse 93   Respirations 20   SpO2 95 %   Observations & Evaluations   Level of Consciousness 0   Oriented X 4  (notably more confused than yesterday)   Heart Rhythm   (remote tele)   Respiratory Quality/Effort Unlabored   O2 Device None (Room air)   Skin Condition/Temp Warm;Dry   Abdomen Inspection Rounded;Soft   Edema None   Peritoneal Dialysis Catheter Mid lower abdomen   No placement date or time found.   Catheter Location: Mid lower abdomen   Status Deaccessed   Dressing Status Clean, dry & intact   Dressing Gauze   Date of Last Dressing Change 02/24/24   Dialysis Type Continuous cycling   Catheter Care Given Yes  (alcavis 2min scrub/soak)     Primary RN SBAR: PAMELA England RN  Comments: Treatment not completed overnight. Cycler in drain 1 of 5. KAMILAH Ulloa MD notified. TORB to allow final fill of 1500 ml and resume treatment as ordered tonight. Vanc level 17.8 this AM.

## 2024-02-25 NOTE — PROGRESS NOTES
Pt had a fall this morning at 0400.  Staff nurse went into room and as she entered the room patient was sitting in recliner but missed and sat onto the floor.  Assessment completed, ROM WNL, no neurological deficits noted, skin remains intact.  Patient denied pain related to fall, no injuries noted to patient.  Ms Aguilar stated that she wanted to sit in the chair and eat some jello.  Pt ate  jello and then was assisted back to bed.

## 2024-02-25 NOTE — PROGRESS NOTES
0545: Writer entered patient's room to check on patient and pt was reaching in the air.  When asked patient what she was doing, she replied that she was looking for her phone.  Writer informed patient phone was on table and patient responded to writer that she was getting a spoon.     0630:  Writer went into room and observed patient pulling at IV.  Writer asked patient what she was doing and she replied that she is trying to get her keys.

## 2024-02-25 NOTE — PROGRESS NOTES
Pharmacist Note - IP Vancomycin Dosing  Indication: Peritonitis (started vancomycin on 1/26/24 PTA)  Current regimen: IP Dosing by Level    Recent Labs     02/23/24  0603 02/23/24  0811 02/23/24  1500 02/24/24  0538 02/25/24  0220   WBC 6.4  --   --  6.5 7.3   CREATININE 5.29*   < > 5.42* 5.04* 5.38*   BUN 14   < > 14 14 15    < > = values in this interval not displayed.     A vancomycin level of 17.8 mcg/mL was obtained ~5 days from last outpatient dose of IP vancomycin on 2/20/24    Goal target range Trough 10-15 mcg/mL      Plan: No Vancomycin in IP bag is due today. Pharmacy will continue to monitor this patient daily for changes in clinical status and renal function.

## 2024-02-25 NOTE — FLOWSHEET NOTE
Manual Drain 02/25/24 1452   Observations & Evaluations   Level of Consciousness 0   Oriented X 4   Heart Rhythm   (remote tele)   Respiratory Quality/Effort Unlabored   O2 Device None (Room air)   Bilateral Breath Sounds Clear   Skin Condition/Temp Warm;Dry   Appetite Vomiting   Abdomen Inspection Rounded;Soft   Edema None   Peritoneal Dialysis Catheter Mid lower abdomen   No placement date or time found.   Catheter Location: Mid lower abdomen   Status Accessed   Dressing Status Clean, dry & intact   Dressing Gauze   Date of Last Dressing Change 02/24/24   Dialysis Type   (draining last fill for CT scan)   Catheter Care Given Yes  (alcavis 2min scrub/soak)   Peritoneal Dialysis (CAPD manual)   Peritoneal Output Status Completed   Effluent Appearance Clear;Yellow   Peritoneal Dialysis Volume In (ml) 1500 ml   Effluent Volume Out (mL) 1200 ml   Balance This Exchange (mL) -300 ml     Primary RN SBAR: DEBBIE England, RN  Comments: At bedside to perform manual drain for CT scan

## 2024-02-25 NOTE — FLOWSHEET NOTE
Peritoneal Dialysis Initiation / [phone]    Primary RN SBAR: Bud, JANES  Patient Education: access/site care  Hospital associated wait time; reason: PD Dianeal bags not delivered by pharmacy    Hepatitis B Surface Ag   Date/Time Value Ref Range Status   01/28/2024 07:09 AM <0.10 Index Final     Hep B S Ab   Date/Time Value Ref Range Status   01/31/2024 06:50 AM <3.10 mIU/mL Final       02/24/24 2110   Vitals   /68   Temp 98 °F (36.7 °C)   Temp Source Oral   Pulse 90   Respirations 16   SpO2 97 %   Observations & Evaluations   Level of Consciousness 0   Oriented X 4   Heart Rhythm Regular   Respiratory Quality/Effort Unlabored   O2 Device None (Room air)   Bilateral Breath Sounds Clear   Skin Condition/Temp Warm;Dry   Abdomen Inspection Rounded;Soft   Edema None   Peritoneal Dialysis Catheter Mid lower abdomen   No placement date or time found.   Catheter Location: Mid lower abdomen   Status Accessed   Site Condition Clean, dry, intact   Dressing Status New dressing applied;Clean, dry & intact   Dressing Gauze   Date of Last Dressing Change 02/24/24   Dialysis Type Continuous cycling   Exit Site Condition good   Catheter Care Given Yes   Cycler   Verification of Prescription CCPD   Informed Consent    (chronic consent applies)   Total Volume Programmed 47287 mL   Therapy Time (Hours:Minutes) 9:30   Cycler Type Jung HomeChoice   Fill Volume 2500 mL   Last Fill Volume 0 mL   Dextrose Setting Same (Nonextraneal)   I Drain Alarm 0 mL   Number of Cycles 5   Bag Volume 6000 mL   Number of Bags Used 3   Dianeal Solution   (1.5% Dextrose in 6000 ml x 3)     Miguelangel RN at bedside to initiate CCPD tx for the night. Pt orders, notes, labs, code status reviewed.     Remained present during initial drain followed by initiation of first fill. Prior to departure, bed in lowest position, call bell and personal belongings within reach. Lines visible, secure, and connections fastened well.  Education and pre/post

## 2024-02-25 NOTE — PROGRESS NOTES
POST FALL MANAGEMENT    Galilea Aguilar  MEDICAL RECORD NUMBER:  [id number]  AGE: 68 y.o.   GENDER: female  : 1955  TODAYS DATE:  2024    Details     Fall Occurred: Yes    Was the Fall Witnessed:  Yes         Brief Review of Event:Nurse went to check patient's tele box because there was no data displayed, patient was in the process of sitting in chair.  Patient made contact with chair but slipped/ sat onto the floor because she was not close enough to the recliner.         Who found the patient: JANES Daniel      Where was the patient at the time of the fall: Patient was in her room      Patient Comments: \"I did not fall.  I slipped from the bed\"      Date Fall Occurred:  2024 .       Time Fall Occurred: 4:00a.m.     Assessment     Post Fall Head to Toe Assessment Completed: Yes    Post Fall Predictive Analytic Score Reviewed: Yes     Post Fall Vitals Completed: Yes    Post Fall Neuro Checks Completed: Yes    Injury Occurred(if yes, describe injury):  no           Did the Patient Experience:(Check Nicolás all that apply)    [] Patient hit head  [] Loss of consciousness  [] Change in mental status following the fall  [] Patient is on an anticoagulant medication      CT Performed:  no    Follow-up     Persons Notified of Fall:  (Provide names of persons notified)   [x] Physician:   [] LARISA:  [x] Nursing Supervisior:  [x] Manager:  [] Pharmacist:  [x] Family:  [] Other:      Electronically signed by Filomena Gaona RN 2024 at 8:45 AM

## 2024-02-25 NOTE — FLOWSHEET NOTE
CCPD Connection: 02/25/24 1546   Vitals   /78   Temp Source Oral   Pulse 87   Respirations 18   SpO2 98 %   Observations & Evaluations   Level of Consciousness 0   Oriented X 4  (confused but oriented)   Heart Rhythm   (remote tele)   Respiratory Quality/Effort Unlabored   O2 Device None (Room air)   Bilateral Breath Sounds Clear   Skin Condition/Temp Warm;Dry   Abdomen Inspection Rounded;Soft   Edema None   Peritoneal Dialysis Catheter Mid lower abdomen   No placement date or time found.   Catheter Location: Mid lower abdomen   Status Accessed   Site Condition Clean, dry, intact   Dressing Status New dressing applied   Dressing Gauze   Date of Last Dressing Change 02/25/24   Dialysis Type Continuous cycling   Exit Site Condition excellent   Catheter Care Given Yes   Cycler   Verification of Prescription CCPD   Informed Consent  Yes  (chronic consent applies)   Total Volume Programmed 64182 mL   Therapy Time (Hours:Minutes) 9:30   Cycler Type Jung HomeChoice   Fill Volume 2500 mL   Last Fill Volume 0 mL   Dextrose Setting Same (Nonextraneal)   I Drain Alarm 0 mL   Number of Cycles 5   Bag Volume 6000 mL   Number of Bags Used 3   Dianeal Solution   (Dextrose 1.5% in 6000 mL)       Hepatitis B Surface Ag   Date/Time Value Ref Range Status   01/28/2024 07:09 AM <0.10 Index Final            Hep B S Ab   Date/Time Value Ref Range Status   01/31/2024 06:50 AM <3.10 mIU/mL Final      Time Out (time): 1540  Primary RN SBAR: DEBBIE England RN  Patient Education: Infection prevention  Comments: Remained present for completion of initial drain and initiation of first fill. Education with primary RN to check for alarms overnight.     Start time: 1550 (2/25/24)  Est end time: 0120 (2/26/24)

## 2024-02-25 NOTE — PROGRESS NOTES
137 133* 132*   K HEMOLYZED,RECOLLECT REQUESTED   < > 2.7* 2.6* 4.5      < > 100 99 101   CO2 26   < > 30 28 27   BUN 14   < > 14 14 15   MG HEMOLYZED,RECOLLECT REQUESTED  --   --  1.7  --    PHOS  --   --   --  1.9* 2.1*    < > = values in this interval not displayed.       No results for input(s): \"ALT\", \"TP\", \"ALB\", \"GLOB\", \"GGT\", \"AML\" in the last 72 hours.    Invalid input(s): \"SGOT\", \"GPT\", \"AP\", \"TBIL\", \"TBILI\", \"AMYP\", \"LPSE\", \"HLPSE\"    No results for input(s): \"INR\", \"APTT\" in the last 72 hours.    Invalid input(s): \"PTP\"   No results for input(s): \"TIBC\", \"FERR\" in the last 72 hours.    Invalid input(s): \"FE\", \"PSAT\"   No results found for: \"FOL\", \"RBCF\"   No results for input(s): \"PH\", \"PCO2\", \"PO2\" in the last 72 hours.  No results for input(s): \"CPK\" in the last 72 hours.    Invalid input(s): \"CPKMB\", \"CKNDX\", \"TROIQ\"  Lab Results   Component Value Date/Time    CHOL 123 12/14/2023 06:22 AM    HDL 38 12/14/2023 06:22 AM     Lab Results   Component Value Date/Time    GLUCPOC 159 01/09/2023 03:20 PM    GLUCPOC 166 01/09/2023 03:05 PM           Medications Reviewed:     Current Facility-Administered Medications   Medication Dose Route Frequency    droNABinol (MARINOL) capsule 2.5 mg  2.5 mg Oral BID    apixaban (ELIQUIS) tablet 2.5 mg  2.5 mg Oral BID    aspirin chewable tablet 81 mg  81 mg Oral Daily    carvedilol (COREG) tablet 6.25 mg  6.25 mg Oral BID WC    DULoxetine (CYMBALTA) extended release capsule 20 mg  20 mg Oral BID    ipratropium 0.5 mg-albuterol 2.5 mg (DUONEB) nebulizer solution 1 Dose  1 Dose Inhalation Q6H PRN    arformoterol 15 mcg-budesonide 0.25 mg neb solution   Nebulization BID RT    montelukast (SINGULAIR) tablet 10 mg  10 mg Oral Nightly    epoetin mohan-epbx (RETACRIT) injection 20,000 Units  20,000 Units SubCUTAneous Weekly    lidocaine 4 % external patch 1 patch  1 patch TransDERmal Daily    sodium chloride flush 0.9 % injection 5-40 mL  5-40 mL IntraVENous 2 times per day     sodium chloride flush 0.9 % injection 5-40 mL  5-40 mL IntraVENous PRN    0.9 % sodium chloride infusion   IntraVENous PRN    polyethylene glycol (GLYCOLAX) packet 17 g  17 g Oral Daily PRN    acetaminophen (TYLENOL) tablet 650 mg  650 mg Oral Q6H PRN    Or    acetaminophen (TYLENOL) suppository 650 mg  650 mg Rectal Q6H PRN    dianeal lo-brandi 1.5% 6,000 mL  6,000 mL IntraPERitoneal Daily    dianeal lo-brandi 1.5% 6,000 mL  6,000 mL IntraPERitoneal Daily    dianeal lo-brandi 1.5% 6,000 mL  6,000 mL IntraPERitoneal Daily    phenylephrine-mineral oil-petrolatum (PREPARATION H) rectal ointment   Rectal BID PRN    dextrose bolus 10% 125 mL  125 mL IntraVENous PRN    Or    dextrose bolus 10% 250 mL  250 mL IntraVENous PRN    glucagon injection 1 mg  1 mg SubCUTAneous PRN    dextrose 10 % infusion   IntraVENous Continuous PRN    insulin lispro (HUMALOG) injection vial 0-4 Units  0-4 Units SubCUTAneous TID WC    insulin lispro (HUMALOG) injection vial 0-4 Units  0-4 Units SubCUTAneous Nightly    gentamicin (GARAMYCIN) 0.1 % cream   Topical PRN    prochlorperazine (COMPAZINE) injection 5 mg  5 mg IntraVENous Q6H PRN    morphine (PF) injection 1 mg  1 mg IntraVENous Q4H PRN    scopolamine (TRANSDERM-SCOP) transdermal patch 1 patch  1 patch TransDERmal Q72H     ______________________________________________________________________  EXPECTED LENGTH OF STAY: Unable to retrieve estimated LOS  ACTUAL LENGTH OF STAY:          3                 Maya Guzman MD

## 2024-02-26 LAB
ALBUMIN SERPL-MCNC: 1.4 G/DL (ref 3.5–5)
ALBUMIN SERPL-MCNC: 1.6 G/DL (ref 3.5–5)
ALBUMIN/GLOB SERPL: 0.3 (ref 1.1–2.2)
ALP SERPL-CCNC: 145 U/L (ref 45–117)
ALT SERPL-CCNC: 11 U/L (ref 12–78)
AMMONIA PLAS-SCNC: 22 UMOL/L
ANION GAP SERPL CALC-SCNC: 6 MMOL/L (ref 5–15)
AST SERPL-CCNC: 13 U/L (ref 15–37)
BACTERIA SPEC CULT: NORMAL
BASOPHILS # BLD: 0.1 K/UL (ref 0–0.1)
BASOPHILS NFR BLD: 1 % (ref 0–1)
BILIRUB DIRECT SERPL-MCNC: 0.3 MG/DL (ref 0–0.2)
BILIRUB SERPL-MCNC: 0.8 MG/DL (ref 0.2–1)
BUN SERPL-MCNC: 16 MG/DL (ref 6–20)
BUN/CREAT SERPL: 3 (ref 12–20)
CALCIUM SERPL-MCNC: 7.4 MG/DL (ref 8.5–10.1)
CHLORIDE SERPL-SCNC: 100 MMOL/L (ref 97–108)
CO2 SERPL-SCNC: 30 MMOL/L (ref 21–32)
CREAT SERPL-MCNC: 4.96 MG/DL (ref 0.55–1.02)
CRP SERPL-MCNC: 11.6 MG/DL (ref 0–0.3)
DIFFERENTIAL METHOD BLD: ABNORMAL
EOSINOPHIL # BLD: 0.2 K/UL (ref 0–0.4)
EOSINOPHIL NFR BLD: 2 % (ref 0–7)
ERYTHROCYTE [DISTWIDTH] IN BLOOD BY AUTOMATED COUNT: 17.5 % (ref 11.5–14.5)
ERYTHROCYTE [SEDIMENTATION RATE] IN BLOOD: 65 MM/HR (ref 0–30)
GLOBULIN SER CALC-MCNC: 4.7 G/DL (ref 2–4)
GLUCOSE BLD STRIP.AUTO-MCNC: 117 MG/DL (ref 65–117)
GLUCOSE BLD STRIP.AUTO-MCNC: 131 MG/DL (ref 65–117)
GLUCOSE BLD STRIP.AUTO-MCNC: 162 MG/DL (ref 65–117)
GLUCOSE BLD STRIP.AUTO-MCNC: 177 MG/DL (ref 65–117)
GLUCOSE SERPL-MCNC: 146 MG/DL (ref 65–100)
GRAM STN SPEC: NORMAL
GRAM STN SPEC: NORMAL
HBV SURFACE AG SER QL: <0.1 INDEX
HBV SURFACE AG SER QL: NEGATIVE
HCT VFR BLD AUTO: 25.9 % (ref 35–47)
HGB BLD-MCNC: 8.2 G/DL (ref 11.5–16)
IMM GRANULOCYTES # BLD AUTO: 0 K/UL (ref 0–0.04)
IMM GRANULOCYTES NFR BLD AUTO: 1 % (ref 0–0.5)
LACTATE SERPL-SCNC: 2.3 MMOL/L (ref 0.4–2)
LDH SERPL L TO P-CCNC: 471 U/L (ref 81–246)
LYMPHOCYTES # BLD: 1.1 K/UL (ref 0.8–3.5)
LYMPHOCYTES NFR BLD: 18 % (ref 12–49)
MCH RBC QN AUTO: 28.4 PG (ref 26–34)
MCHC RBC AUTO-ENTMCNC: 31.7 G/DL (ref 30–36.5)
MCV RBC AUTO: 89.6 FL (ref 80–99)
MONOCYTES # BLD: 0.8 K/UL (ref 0–1)
MONOCYTES NFR BLD: 12 % (ref 5–13)
NEUTS SEG # BLD: 4.3 K/UL (ref 1.8–8)
NEUTS SEG NFR BLD: 66 % (ref 32–75)
NRBC # BLD: 0 K/UL (ref 0–0.01)
NRBC BLD-RTO: 0 PER 100 WBC
PHOSPHATE SERPL-MCNC: 2.5 MG/DL (ref 2.6–4.7)
PLATELET # BLD AUTO: 140 K/UL (ref 150–400)
PMV BLD AUTO: 10.2 FL (ref 8.9–12.9)
POTASSIUM SERPL-SCNC: 3.8 MMOL/L (ref 3.5–5.1)
PROT SERPL-MCNC: 6.3 G/DL (ref 6.4–8.2)
RBC # BLD AUTO: 2.89 M/UL (ref 3.8–5.2)
SERVICE CMNT-IMP: ABNORMAL
SERVICE CMNT-IMP: NORMAL
SERVICE CMNT-IMP: NORMAL
SODIUM SERPL-SCNC: 136 MMOL/L (ref 136–145)
VANCOMYCIN SERPL-MCNC: 16.6 UG/ML
WBC # BLD AUTO: 6.5 K/UL (ref 3.6–11)

## 2024-02-26 PROCEDURE — 86140 C-REACTIVE PROTEIN: CPT

## 2024-02-26 PROCEDURE — 80076 HEPATIC FUNCTION PANEL: CPT

## 2024-02-26 PROCEDURE — 83615 LACTATE (LD) (LDH) ENZYME: CPT

## 2024-02-26 PROCEDURE — 87040 BLOOD CULTURE FOR BACTERIA: CPT

## 2024-02-26 PROCEDURE — 94640 AIRWAY INHALATION TREATMENT: CPT

## 2024-02-26 PROCEDURE — 83605 ASSAY OF LACTIC ACID: CPT

## 2024-02-26 PROCEDURE — 80202 ASSAY OF VANCOMYCIN: CPT

## 2024-02-26 PROCEDURE — 2580000003 HC RX 258: Performed by: NURSE PRACTITIONER

## 2024-02-26 PROCEDURE — 82140 ASSAY OF AMMONIA: CPT

## 2024-02-26 PROCEDURE — 6370000000 HC RX 637 (ALT 250 FOR IP): Performed by: NURSE PRACTITIONER

## 2024-02-26 PROCEDURE — 87340 HEPATITIS B SURFACE AG IA: CPT

## 2024-02-26 PROCEDURE — 36415 COLL VENOUS BLD VENIPUNCTURE: CPT

## 2024-02-26 PROCEDURE — 82962 GLUCOSE BLOOD TEST: CPT

## 2024-02-26 PROCEDURE — 6370000000 HC RX 637 (ALT 250 FOR IP): Performed by: STUDENT IN AN ORGANIZED HEALTH CARE EDUCATION/TRAINING PROGRAM

## 2024-02-26 PROCEDURE — 85652 RBC SED RATE AUTOMATED: CPT

## 2024-02-26 PROCEDURE — 6360000002 HC RX W HCPCS: Performed by: NURSE PRACTITIONER

## 2024-02-26 PROCEDURE — 80069 RENAL FUNCTION PANEL: CPT

## 2024-02-26 PROCEDURE — 2060000000 HC ICU INTERMEDIATE R&B

## 2024-02-26 PROCEDURE — 85025 COMPLETE CBC W/AUTO DIFF WBC: CPT

## 2024-02-26 PROCEDURE — 82164 ANGIOTENSIN I ENZYME TEST: CPT

## 2024-02-26 RX ORDER — LANOLIN ALCOHOL/MO/W.PET/CERES
3 CREAM (GRAM) TOPICAL NIGHTLY PRN
Status: DISCONTINUED | OUTPATIENT
Start: 2024-02-26 | End: 2024-03-16 | Stop reason: HOSPADM

## 2024-02-26 RX ORDER — DIPHENHYDRAMINE HYDROCHLORIDE 50 MG/ML
25 INJECTION INTRAMUSCULAR; INTRAVENOUS
Status: DISCONTINUED | OUTPATIENT
Start: 2024-02-26 | End: 2024-02-27

## 2024-02-26 RX ORDER — DIAZEPAM 5 MG/ML
2.5 INJECTION, SOLUTION INTRAMUSCULAR; INTRAVENOUS ONCE
Status: DISCONTINUED | OUTPATIENT
Start: 2024-02-26 | End: 2024-03-01

## 2024-02-26 RX ORDER — MORPHINE SULFATE 2 MG/ML
1 INJECTION, SOLUTION INTRAMUSCULAR; INTRAVENOUS EVERY 6 HOURS PRN
Status: DISCONTINUED | OUTPATIENT
Start: 2024-02-26 | End: 2024-02-26

## 2024-02-26 RX ADMIN — ASPIRIN 81 MG CHEWABLE TABLET 81 MG: 81 TABLET CHEWABLE at 08:50

## 2024-02-26 RX ADMIN — SODIUM CHLORIDE, SODIUM LACTATE, CALCIUM CHLORIDE, MAGNESIUM CHLORIDE AND DEXTROSE 6000 ML: 1.5; 538; 448; 18.3; 5.08 INJECTION, SOLUTION INTRAPERITONEAL at 15:09

## 2024-02-26 RX ADMIN — Medication 10 ML: at 21:21

## 2024-02-26 RX ADMIN — MONTELUKAST 10 MG: 10 TABLET, FILM COATED ORAL at 21:21

## 2024-02-26 RX ADMIN — CARVEDILOL 6.25 MG: 6.25 TABLET, FILM COATED ORAL at 08:50

## 2024-02-26 RX ADMIN — DULOXETINE HYDROCHLORIDE 20 MG: 20 CAPSULE, DELAYED RELEASE ORAL at 08:50

## 2024-02-26 RX ADMIN — Medication 10 ML: at 08:51

## 2024-02-26 RX ADMIN — DULOXETINE HYDROCHLORIDE 20 MG: 20 CAPSULE, DELAYED RELEASE ORAL at 21:21

## 2024-02-26 RX ADMIN — APIXABAN 2.5 MG: 2.5 TABLET, FILM COATED ORAL at 21:22

## 2024-02-26 RX ADMIN — ARFORMOTEROL TARTRATE: 15 SOLUTION RESPIRATORY (INHALATION) at 21:58

## 2024-02-26 RX ADMIN — ARFORMOTEROL TARTRATE: 15 SOLUTION RESPIRATORY (INHALATION) at 08:01

## 2024-02-26 RX ADMIN — APIXABAN 2.5 MG: 2.5 TABLET, FILM COATED ORAL at 08:50

## 2024-02-26 RX ADMIN — GENTAMICIN SULFATE: 1 CREAM TOPICAL at 15:08

## 2024-02-26 ASSESSMENT — PAIN SCALES - GENERAL
PAINLEVEL_OUTOF10: 0
PAINLEVEL_OUTOF10: 0

## 2024-02-26 NOTE — FLOWSHEET NOTE
Peritoneal Dialysis Disconnection / [phone]    Primary RN SBAR: Alen, RN  Patient Education: pt AMS - no family at bedside     Hepatitis B Surface Ag   Date/Time Value Ref Range Status   01/28/2024 07:09 AM <0.10 Index Final     Hep B S Ab   Date/Time Value Ref Range Status   01/31/2024 06:50 AM <3.10 mIU/mL Final       02/26/24 0328   Peritoneal Dialysis Catheter Mid lower abdomen   No placement date or time found.   Catheter Location: Mid lower abdomen   Status Deaccessed   Site Condition Clean, dry, intact   Dressing Status Clean, dry & intact   Dressing Gauze   Date of Last Dressing Change 02/25/24   Dialysis Type Continuous cycling   Exit Site Condition excellent   Catheter Care Given Yes   Post-Treatment (Cycler)   Average Dwell Time (Hours:Minutes) 1:17   Lost Dwell Time (Hours:Minutes) 0:28   Effluent Appearance Clear;Yellow   Volume Gain (mL) 285 mL  ( ml + TUF (-412 ml) = 285)     Miguelangel RN at bedside to disconnect pt from CCPD tx. Orders, consent, pt, and code status confirmed. Tx completed. Used cassette and bags discarded. Education and pre/post report given to primary RN.    Net fluid gain: 285 ml (primary reports pt poor intake today)

## 2024-02-26 NOTE — PROGRESS NOTES
[facility]      Nephrology  Note  Galilea Aguilar  Date of Admission : 2/21/2024    CC:  Follow up for ESRD       Assessment and Plan     ESRD on PD   -Continue with current CCPD orders.  Net UF: Positive  -Looks clinically dry on exam  -If p.o. intake remains poor, restart IVF    Recent PD peritonitis  - cx positive for staph epi  -Completed IV antibiotics  -No further IP antibiotics planned  -Repeat PD fluid cell count tomorrow    Hypokalemia/Mg  Severe hypoalbuminemia  -2/2 poor nutrition  -GI eval    Encephalopathy:  -?  Toxic metabolic picture  -Minimize narcotics, sedatives  -Less likely related to dialysis    Abdominal pain with vomiting  - CT showing gall stones with sludge, right retroperitoneal mass consistent with Lipoma that is unchanged from prior study. Renal cysts  - GI has been consulted recommend stopping Trulicity/ Scopolamine patch    Anemia of CKD  -Continue LIS    MBD of CKD  - corcal stable 8.5  - check ionized Ca  - Phos low-no binders liberalize phos in diet    Prolonged QT interval   HTN   DM  COPD  CAD         Interval History:  Patient seen and examined.  No issues with the dialysis.  Net positive with UF.  Confused since she received a dose of Marinol over the weekend.  Poor p.o. intake    Current Medications: all current  Medications have been eviewed in EPIC  Review of Systems: Review of systems not obtained due to patient factors.    Objective:  Vitals:    Vitals:    02/26/24 0548 02/26/24 0726 02/26/24 1000 02/26/24 1200   BP:  130/76     Pulse: 90 89 92 92   Resp:       Temp:  97.8 °F (36.6 °C)     TempSrc:  Oral     SpO2:  96%     Weight:       Height:         Intake and Output:  No intake/output data recorded.  02/24 1901 - 02/26 0700  In: 1785   Out: 1200     Physical Examination:  General: Confused  Neck:  Supple, no mass  Resp:  Lungs CTA B/L, no wheezing , normal respiratory effort  CV:  Regular Rate/ Rhythm,  no murmur or rub,no  LE edema  GI:  Soft, NT,

## 2024-02-26 NOTE — PROGRESS NOTES
Occupational Therapy   02.26.2024    Chart reviewed in prep for OT treatment, RN reporting patient not appropriate at this time for skilled intervention. Will defer and follow up as able and medically appropriate. Thank you.     Kasey Levy MS, OTR/L

## 2024-02-26 NOTE — PROGRESS NOTES
Hospitalist Progress Note  Maya Guzman MD  Answering service: [phone] OR [phone] from in house phone        Date of Service:  2024  NAME:  Galilea gAuilar  :  1955  MRN:  [id number]      Admission Summary:   HPI: \"Galilea Aguilar is a 68 y.o. female with a history of CAD, COPD, CVA, diabetes, ESRD on peritoneal dialysis who presented to the ED with chief complaint of vomiting and left side/flank pain. In the ED, labs consistent with known renal dysfunction. QTc was found to be prolonged (554). CT demonstrated a large right retroperitoneal mass though has been present on several CT scans since . Patient treated with IV Valium for nausea and referred to hospitalists for further management.      Patient was seen and examined this morning in the ED, she was lying in bed in no acute distress -though did notice her having \"dry heaves\". Patient was recently admitted from  - 2024 for generalized abdominal pain, diagnosed with severe sepsis secondary to peritonitis and was discharged on vancomycin to be given through her peritoneal dialysis fluid and has also been prescribed Diflucan daily for the next month while on antibiotics.  She reports that since she has been discharged, she has had nausea and vomiting that has been persistent and has had difficulty keeping anything down.  Denies any headaches or dizziness, no chest pain or pressure, no shortness of breath or coughing.  Reports no diarrhea or constipation at home and has no abdominal pain.       Medication reconciliation completed with patient at bedside.      Addendum 1540:   - Tylenol not sufficient  for left sided flank discomfort  - give low dose morphine as she is having difficulty tolerating po. Was given 4 mg last night.  - add compazine as alternate nausea mediction\"       Interval history / Subjective:   Patient seen examined at bedside

## 2024-02-26 NOTE — FLOWSHEET NOTE
CCPD Connection:     02/26/24 1510   Vitals   /66   Temp 98.4 °F (36.9 °C)   Temp Source Oral   Pulse 87   Respirations 18   Observations & Evaluations   Level of Consciousness 0   Heart Rhythm Regular   Respiratory Quality/Effort Unlabored   O2 Device None (Room air)   Skin Condition/Temp Dry;Warm   Abdomen Inspection Rounded;Soft   Edema None   Peritoneal Dialysis Catheter Mid lower abdomen   No placement date or time found.   Catheter Location: Mid lower abdomen   Status Accessed   Site Condition Clean, dry, intact   Dressing Status New dressing applied;Clean, dry & intact   Dressing Gauze   Date of Last Dressing Change 02/26/24   Dialysis Type Continuous cycling   Exit Site Condition Good   Catheter Care Given Yes   Cycler   Verification of Prescription CCPD   Total Volume Programmed [id number] mL   Therapy Time (Hours:Minutes) 9:30   Cycler Type Jung HomeChoice   Fill Volume 2500 mL   Last Fill Volume 0 mL   Number of Cycles 5   Bag Volume 6000 mL   Number of Bags Used 3   Dianeal Solution   (1.5% Dextrose)     Primary RN SBAR: TY Costa RN  Patient Education: PD cath infection prevention & control.  Hepatitis B Surface Ag   Date/Time Value Ref Range Status   02/26/2024 04:19 AM <0.10 Index Final     Hep B S Ab   Date/Time Value Ref Range Status   01/31/2024 06:50 AM <3.10 mIU/mL Final     Pt orders, notes, labs, code status and consent reviewed. Time out complete.       Left abdominal PD site cleansed with Exsept followed by Gentamycin and dry gauze dressing dated 2/26/24, no redness or drainage at exit site. Prior to connection, one minute Alcavis scrub & soak performed.    PD Start Time 1530   PD End Time: 0100     Remained present during initial drain followed by initiation of first fill. Upon departure, bed in lowest position & personal belongings within reach.

## 2024-02-26 NOTE — PROGRESS NOTES
Time: 02/26/24  4:18 AM   Result Value Ref Range    WBC 6.5 3.6 - 11.0 K/uL    RBC 2.89 (L) 3.80 - 5.20 M/uL    Hemoglobin 8.2 (L) 11.5 - 16.0 g/dL    Hematocrit 25.9 (L) 35.0 - 47.0 %    MCV 89.6 80.0 - 99.0 FL    MCH 28.4 26.0 - 34.0 PG    MCHC 31.7 30.0 - 36.5 g/dL    RDW 17.5 (H) 11.5 - 14.5 %    Platelets 140 (L) 150 - 400 K/uL    MPV 10.2 8.9 - 12.9 FL    Nucleated RBCs 0.0 0  WBC    nRBC 0.00 0.00 - 0.01 K/uL    Neutrophils % 66 32 - 75 %    Lymphocytes % 18 12 - 49 %    Monocytes % 12 5 - 13 %    Eosinophils % 2 0 - 7 %    Basophils % 1 0 - 1 %    Immature Granulocytes 1 (H) 0.0 - 0.5 %    Neutrophils Absolute 4.3 1.8 - 8.0 K/UL    Lymphocytes Absolute 1.1 0.8 - 3.5 K/UL    Monocytes Absolute 0.8 0.0 - 1.0 K/UL    Eosinophils Absolute 0.2 0.0 - 0.4 K/UL    Basophils Absolute 0.1 0.0 - 0.1 K/UL    Absolute Immature Granulocyte 0.0 0.00 - 0.04 K/UL    Differential Type AUTOMATED     Vancomycin Level, Random    Collection Time: 02/26/24  4:19 AM   Result Value Ref Range    Vancomycin Rm 16.6 UG/ML   C-Reactive Protein    Collection Time: 02/26/24  4:19 AM   Result Value Ref Range    CRP 11.60 (H) 0.00 - 0.30 mg/dL   Sedimentation Rate    Collection Time: 02/26/24  4:19 AM   Result Value Ref Range    Sed Rate, Automated 65 (H) 0 - 30 mm/hr   Lactate Dehydrogenase    Collection Time: 02/26/24  4:19 AM   Result Value Ref Range     (H) 81 - 246 U/L   Hepatitis B Surface Antigen    Collection Time: 02/26/24  4:19 AM   Result Value Ref Range    Hepatitis B Surface Ag <0.10 Index    Hep B S Ag Interp Negative NEG     Hepatic Function Panel    Collection Time: 02/26/24  4:19 AM   Result Value Ref Range    Total Protein 6.3 (L) 6.4 - 8.2 g/dL    Albumin 1.6 (L) 3.5 - 5.0 g/dL    Globulin 4.7 (H) 2.0 - 4.0 g/dL    Albumin/Globulin Ratio 0.3 (L) 1.1 - 2.2      Total Bilirubin 0.8 0.2 - 1.0 MG/DL    Bilirubin, Direct 0.3 (H) 0.0 - 0.2 MG/DL    Alk Phosphatase 145 (H) 45 - 117 U/L    AST 13 (L) 15 - 37 U/L     on 2/25/24 per nursing staff had AMS since taking the marinol.   Continues with vomiting green emesis per nursing staff.  Has prolonged Qtc will avoid   Prokinetic medications.      - Stop marinol and morphine  - Continue scolpolamine patch.   - Trend Qtc- daily EKG  - CLD as tolerated   - check LFT, ammonia, LA, blood cultures - recent peritonitis   - replace K+ as needed   - Supportive care per hospitalist  - renal following   - GI following            Patient Active Hospital Problem List:   Principal Problem:    Vomiting  Active Problems:    Intractable nausea and vomiting    Type 2 diabetes with nephropathy (HCC)  Resolved Problems:    * No resolved hospital problems. *      Time Spent with Patient: 30  DAYA Watkins - NP

## 2024-02-26 NOTE — PROGRESS NOTES
Physical Therapy    Chart reviewed in prep for PT treatment, RN reporting patient not appropriate at this time for skilled intervention. Will defer and follow up as able and medically appropriate. Thank you.     Natalya Mckeon MS, PT

## 2024-02-26 NOTE — CONSULTS
NUTRITION SUPPLEMENT; Dinner, Breakfast; Fortified Gelatin Oral Supplement  ADULT DIET; Clear Liquid       PSYCHOSOCIAL/SPIRITUAL SCREENING:   Palliative IDT has assessed this patient for cultural preferences / practices and a referral made as appropriate to needs (Cultural Services, Patient Advocacy, Ethics, etc.)    Spiritual Affiliation: Mu-ism    Any spiritual / Taoism concerns:  [] Yes /  [x] No   If \"Yes\" to discuss with pastoral care during IDT     Does caregiver feel burdened by caring for their loved one:   [] Yes /  [] No /  [x] No Caregiver Present/Available [] No Caregiver [] Pt Lives at Facility  If \"Yes\" to discuss with social work during IDT    Anticipatory grief assessment:   [x] Normal  / [] Maladaptive     If \"Maladaptive\" to discuss with social work during IDT    ESAS Anxiety:      ESAS Depression:          LAB AND IMAGING FINDINGS:   Objective data reviewed:  labs, images, records, medication use, vitals, and chart     FINAL COMMENTS   Thank you for allowing Palliative Medicine to participate in the care of Galilea Aguilar.    Only check if applicable and billing time based rather than MDM  [x] The total encounter time on this service date was _55_ minutes which was spent performing a face-to-face encounter and personally completing the provider-level activities documented in the note. This includes time spent prior to the visit and after the visit in direct care of the patient. This time does not include time spent in any separately reportable services.    Electronically signed by   DAYA Nickerson NP  Palliative Care Team  on 2/26/2024 at 3:44 PM

## 2024-02-26 NOTE — DIABETES MGMT
Diabetes Management Team to sign off at this point as patient's blood glucose remains stable without need for insulin thus far.   Please re-consult us if patient needs change.  Thank you for including us in their care. Fructosamine resulted 261 which approximates A1C 6.0- 6.5%.         DAYA HARLEY - [facility]   Program for Diabetes Health

## 2024-02-27 ENCOUNTER — APPOINTMENT (OUTPATIENT)
Facility: HOSPITAL | Age: 69
End: 2024-02-27
Payer: MEDICARE

## 2024-02-27 ENCOUNTER — HOME CARE VISIT (OUTPATIENT)
Dept: HOME HEALTH SERVICES | Facility: HOME HEALTH | Age: 69
End: 2024-02-27
Payer: MEDICARE

## 2024-02-27 PROBLEM — Z71.89 ADVANCED CARE PLANNING/COUNSELING DISCUSSION: Status: ACTIVE | Noted: 2024-02-27

## 2024-02-27 PROBLEM — R41.82 ALTERED MENTAL STATUS: Status: ACTIVE | Noted: 2024-02-27

## 2024-02-27 PROBLEM — Z51.5 PALLIATIVE CARE ENCOUNTER: Status: ACTIVE | Noted: 2024-02-27

## 2024-02-27 LAB
ACE SERPL-CCNC: 74 U/L (ref 14–82)
ALBUMIN SERPL-MCNC: 1.6 G/DL (ref 3.5–5)
ANION GAP SERPL CALC-SCNC: 7 MMOL/L (ref 5–15)
BASOPHILS # BLD: 0.1 K/UL (ref 0–0.1)
BASOPHILS NFR BLD: 1 % (ref 0–1)
BUN SERPL-MCNC: 17 MG/DL (ref 6–20)
BUN/CREAT SERPL: 4 (ref 12–20)
CALCIUM SERPL-MCNC: 7.7 MG/DL (ref 8.5–10.1)
CHLORIDE SERPL-SCNC: 100 MMOL/L (ref 97–108)
CO2 SERPL-SCNC: 31 MMOL/L (ref 21–32)
CREAT SERPL-MCNC: 4.84 MG/DL (ref 0.55–1.02)
DIFFERENTIAL METHOD BLD: ABNORMAL
EKG ATRIAL RATE: 93 BPM
EKG DIAGNOSIS: NORMAL
EKG P AXIS: 62 DEGREES
EKG P-R INTERVAL: 122 MS
EKG Q-T INTERVAL: 428 MS
EKG QRS DURATION: 94 MS
EKG QTC CALCULATION (BAZETT): 532 MS
EKG R AXIS: -2 DEGREES
EKG T AXIS: 127 DEGREES
EKG VENTRICULAR RATE: 93 BPM
EOSINOPHIL # BLD: 0.2 K/UL (ref 0–0.4)
EOSINOPHIL NFR BLD: 3 % (ref 0–7)
ERYTHROCYTE [DISTWIDTH] IN BLOOD BY AUTOMATED COUNT: 17.9 % (ref 11.5–14.5)
GLUCOSE BLD STRIP.AUTO-MCNC: 122 MG/DL (ref 65–117)
GLUCOSE BLD STRIP.AUTO-MCNC: 133 MG/DL (ref 65–117)
GLUCOSE BLD STRIP.AUTO-MCNC: 140 MG/DL (ref 65–117)
GLUCOSE BLD STRIP.AUTO-MCNC: 190 MG/DL (ref 65–117)
GLUCOSE SERPL-MCNC: 117 MG/DL (ref 65–100)
HCT VFR BLD AUTO: 24.2 % (ref 35–47)
HGB BLD-MCNC: 7.9 G/DL (ref 11.5–16)
IMM GRANULOCYTES # BLD AUTO: 0 K/UL (ref 0–0.04)
IMM GRANULOCYTES NFR BLD AUTO: 0 % (ref 0–0.5)
LACTATE SERPL-SCNC: 2.5 MMOL/L (ref 0.4–2)
LYMPHOCYTES # BLD: 1.7 K/UL (ref 0.8–3.5)
LYMPHOCYTES NFR BLD: 25 % (ref 12–49)
MCH RBC QN AUTO: 28.5 PG (ref 26–34)
MCHC RBC AUTO-ENTMCNC: 32.6 G/DL (ref 30–36.5)
MCV RBC AUTO: 87.4 FL (ref 80–99)
MONOCYTES # BLD: 0.7 K/UL (ref 0–1)
MONOCYTES NFR BLD: 10 % (ref 5–13)
NEUTS SEG # BLD: 4.4 K/UL (ref 1.8–8)
NEUTS SEG NFR BLD: 61 % (ref 32–75)
NRBC # BLD: 0 K/UL (ref 0–0.01)
NRBC BLD-RTO: 0 PER 100 WBC
PHOSPHATE SERPL-MCNC: 3 MG/DL (ref 2.6–4.7)
PLATELET # BLD AUTO: 141 K/UL (ref 150–400)
PMV BLD AUTO: 10.2 FL (ref 8.9–12.9)
POTASSIUM SERPL-SCNC: 3.5 MMOL/L (ref 3.5–5.1)
RBC # BLD AUTO: 2.77 M/UL (ref 3.8–5.2)
SERVICE CMNT-IMP: ABNORMAL
SODIUM SERPL-SCNC: 138 MMOL/L (ref 136–145)
T3FREE SERPL-MCNC: 1.1 PG/ML (ref 2.2–4)
T4 FREE SERPL-MCNC: 1 NG/DL (ref 0.8–1.5)
TSH SERPL DL<=0.05 MIU/L-ACNC: 3.28 UIU/ML (ref 0.36–3.74)
VANCOMYCIN SERPL-MCNC: 16.7 UG/ML
WBC # BLD AUTO: 7.1 K/UL (ref 3.6–11)

## 2024-02-27 PROCEDURE — 84443 ASSAY THYROID STIM HORMONE: CPT

## 2024-02-27 PROCEDURE — 6360000002 HC RX W HCPCS: Performed by: NURSE PRACTITIONER

## 2024-02-27 PROCEDURE — 97535 SELF CARE MNGMENT TRAINING: CPT

## 2024-02-27 PROCEDURE — A4216 STERILE WATER/SALINE, 10 ML: HCPCS | Performed by: NURSE PRACTITIONER

## 2024-02-27 PROCEDURE — 6370000000 HC RX 637 (ALT 250 FOR IP)

## 2024-02-27 PROCEDURE — 80069 RENAL FUNCTION PANEL: CPT

## 2024-02-27 PROCEDURE — 94640 AIRWAY INHALATION TREATMENT: CPT

## 2024-02-27 PROCEDURE — 2580000003 HC RX 258: Performed by: NURSE PRACTITIONER

## 2024-02-27 PROCEDURE — 6370000000 HC RX 637 (ALT 250 FOR IP): Performed by: NURSE PRACTITIONER

## 2024-02-27 PROCEDURE — 85025 COMPLETE CBC W/AUTO DIFF WBC: CPT

## 2024-02-27 PROCEDURE — 84481 FREE ASSAY (FT-3): CPT

## 2024-02-27 PROCEDURE — 99222 1ST HOSP IP/OBS MODERATE 55: CPT | Performed by: NURSE PRACTITIONER

## 2024-02-27 PROCEDURE — 36415 COLL VENOUS BLD VENIPUNCTURE: CPT

## 2024-02-27 PROCEDURE — 2060000000 HC ICU INTERMEDIATE R&B

## 2024-02-27 PROCEDURE — 74018 RADEX ABDOMEN 1 VIEW: CPT

## 2024-02-27 PROCEDURE — 83605 ASSAY OF LACTIC ACID: CPT

## 2024-02-27 PROCEDURE — 80202 ASSAY OF VANCOMYCIN: CPT

## 2024-02-27 PROCEDURE — C9113 INJ PANTOPRAZOLE SODIUM, VIA: HCPCS | Performed by: NURSE PRACTITIONER

## 2024-02-27 PROCEDURE — 97530 THERAPEUTIC ACTIVITIES: CPT

## 2024-02-27 PROCEDURE — 93005 ELECTROCARDIOGRAM TRACING: CPT | Performed by: STUDENT IN AN ORGANIZED HEALTH CARE EDUCATION/TRAINING PROGRAM

## 2024-02-27 PROCEDURE — 82962 GLUCOSE BLOOD TEST: CPT

## 2024-02-27 PROCEDURE — 84439 ASSAY OF FREE THYROXINE: CPT

## 2024-02-27 PROCEDURE — 6370000000 HC RX 637 (ALT 250 FOR IP): Performed by: STUDENT IN AN ORGANIZED HEALTH CARE EDUCATION/TRAINING PROGRAM

## 2024-02-27 RX ORDER — LIDOCAINE HYDROCHLORIDE 20 MG/ML
JELLY TOPICAL ONCE
Status: COMPLETED | OUTPATIENT
Start: 2024-02-27 | End: 2024-02-27

## 2024-02-27 RX ORDER — LIDOCAINE HYDROCHLORIDE 20 MG/ML
JELLY TOPICAL PRN
Status: DISCONTINUED | OUTPATIENT
Start: 2024-02-27 | End: 2024-03-05 | Stop reason: ALTCHOICE

## 2024-02-27 RX ORDER — DIPHENHYDRAMINE HYDROCHLORIDE 50 MG/ML
50 INJECTION INTRAMUSCULAR; INTRAVENOUS ONCE
Status: COMPLETED | OUTPATIENT
Start: 2024-02-28 | End: 2024-02-28

## 2024-02-27 RX ADMIN — SODIUM CHLORIDE, SODIUM LACTATE, CALCIUM CHLORIDE, MAGNESIUM CHLORIDE AND DEXTROSE 6000 ML: 1.5; 538; 448; 18.3; 5.08 INJECTION, SOLUTION INTRAPERITONEAL at 17:10

## 2024-02-27 RX ADMIN — Medication 3 MG: at 00:38

## 2024-02-27 RX ADMIN — PREDNISONE 50 MG: 20 TABLET ORAL at 22:20

## 2024-02-27 RX ADMIN — MONTELUKAST 10 MG: 10 TABLET, FILM COATED ORAL at 21:35

## 2024-02-27 RX ADMIN — Medication 10 ML: at 21:39

## 2024-02-27 RX ADMIN — ACETAMINOPHEN 650 MG: 325 TABLET ORAL at 17:53

## 2024-02-27 RX ADMIN — CARVEDILOL 6.25 MG: 6.25 TABLET, FILM COATED ORAL at 09:51

## 2024-02-27 RX ADMIN — PANTOPRAZOLE SODIUM 40 MG: 40 INJECTION, POWDER, FOR SOLUTION INTRAVENOUS at 14:35

## 2024-02-27 RX ADMIN — CARVEDILOL 6.25 MG: 6.25 TABLET, FILM COATED ORAL at 17:48

## 2024-02-27 RX ADMIN — Medication 10 ML: at 09:52

## 2024-02-27 RX ADMIN — Medication 3 MG: at 22:20

## 2024-02-27 RX ADMIN — APIXABAN 2.5 MG: 2.5 TABLET, FILM COATED ORAL at 21:35

## 2024-02-27 RX ADMIN — DULOXETINE HYDROCHLORIDE 20 MG: 20 CAPSULE, DELAYED RELEASE ORAL at 21:35

## 2024-02-27 RX ADMIN — ARFORMOTEROL TARTRATE: 15 SOLUTION RESPIRATORY (INHALATION) at 07:21

## 2024-02-27 RX ADMIN — DULOXETINE HYDROCHLORIDE 20 MG: 20 CAPSULE, DELAYED RELEASE ORAL at 09:51

## 2024-02-27 RX ADMIN — SODIUM CHLORIDE, SODIUM LACTATE, CALCIUM CHLORIDE, MAGNESIUM CHLORIDE AND DEXTROSE 6000 ML: 1.5; 538; 448; 18.3; 5.08 INJECTION, SOLUTION INTRAPERITONEAL at 17:09

## 2024-02-27 RX ADMIN — PREDNISONE 50 MG: 20 TABLET ORAL at 17:45

## 2024-02-27 RX ADMIN — LIDOCAINE HYDROCHLORIDE: 20 JELLY TOPICAL at 14:52

## 2024-02-27 RX ADMIN — GENTAMICIN SULFATE: 1 CREAM TOPICAL at 17:09

## 2024-02-27 RX ADMIN — ARFORMOTEROL TARTRATE: 15 SOLUTION RESPIRATORY (INHALATION) at 20:55

## 2024-02-27 RX ADMIN — ASPIRIN 81 MG CHEWABLE TABLET 81 MG: 81 TABLET CHEWABLE at 09:51

## 2024-02-27 RX ADMIN — ACETAMINOPHEN 650 MG: 325 TABLET ORAL at 00:37

## 2024-02-27 RX ADMIN — PROCHLORPERAZINE EDISYLATE 5 MG: 5 INJECTION INTRAMUSCULAR; INTRAVENOUS at 21:38

## 2024-02-27 RX ADMIN — APIXABAN 2.5 MG: 2.5 TABLET, FILM COATED ORAL at 09:51

## 2024-02-27 ASSESSMENT — PAIN SCALES - GENERAL
PAINLEVEL_OUTOF10: 7
PAINLEVEL_OUTOF10: 5
PAINLEVEL_OUTOF10: 0
PAINLEVEL_OUTOF10: 0
PAINLEVEL_OUTOF10: 4

## 2024-02-27 ASSESSMENT — PAIN DESCRIPTION - DESCRIPTORS
DESCRIPTORS: ACHING
DESCRIPTORS: ACHING

## 2024-02-27 ASSESSMENT — PAIN DESCRIPTION - ORIENTATION
ORIENTATION: LEFT
ORIENTATION: LEFT

## 2024-02-27 ASSESSMENT — PAIN DESCRIPTION - LOCATION
LOCATION: ABDOMEN
LOCATION: ABDOMEN

## 2024-02-27 NOTE — CARE COORDINATION
Transition of Care Plan:    RUR: 36% High  Prior Level of Functioning: Modified independent, uses a walker, cane and wheelchair, Family assists with bathing and dressing.  Disposition: Home with family and resumption of  with [facility]  Follow up appointments: PCP, Specialist  DME needed: None  Transportation at discharge: Family   IM/IMM Medicare/ letter given: 2/23/24  Caregiver Contact: Son Mak Aguilar [phone]/Daughter Chaz Aguilar [phone]  Discharge Caregiver contacted prior to discharge? NA  Care Conference needed? No  Barriers to discharge: None    CM continuing to follow patient for transitional care planning needs. Patient DC anticipated greater than 48 hrs, nephrology following.    Malika Serrano, MS  [phone]

## 2024-02-27 NOTE — PLAN OF CARE
Problem: Occupational Therapy - Adult  Goal: By Discharge: Performs self-care activities at highest level of function for planned discharge setting.  See evaluation for individualized goals.  Description: FUNCTIONAL STATUS PRIOR TO ADMISSION:  Patient lives with her daughter who assists with bathing and lower body ADLs PRN, Mod I with RW for functional mobility, receiving  services.    Occupational Therapy Goals:  Initiated 2/23/2024  1.  Patient will perform grooming at the sink with Supervision within 7 day(s).  2.  Patient will perform bathing with Minimal Assist within 7 day(s).  3.  Patient will perform upper and lower body dressing with Supervision & AE PRN within 7 day(s).  4.  Patient will perform toilet transfers with Supervision  within 7 day(s).  5.  Patient will perform all aspects of toileting with Supervision within 7 day(s).  6.  Patient will participate in upper extremity therapeutic exercise/activities with Supervision for 5 minutes within 7 day(s).    7.  Patient will utilize energy conservation techniques during functional activities with verbal and visual cues within 7 day(s).    Outcome: Progressing   OCCUPATIONAL THERAPY TREATMENT  Patient: Galilea Aguilar (68 y.o. female)  Date: 2/27/2024  Primary Diagnosis: Hypomagnesemia [E83.42]  Vomiting [R11.10]  Prolonged Q-T interval on ECG [R94.31]  Intractable nausea and vomiting [R11.2]       Precautions: Contact Precautions, Fall Risk                Chart, occupational therapy assessment, plan of care, and goals were reviewed.    ASSESSMENT  Patient continues to benefit from skilled OT services and is slowly progressing towards goals. Improving cognition and command following this session, remains limited by fair safety awareness, fair processing and sequencing, L side pain, and mild dizziness reported initially with standing. Tolerated up to BSC then chair with overall min A x2 for safety with fair balance and with manual max handling for RW use  treatment:   Patient left in no apparent distress sitting up in chair, Call bell within reach, Bed/ chair alarm activated, and tele sitter    COMMUNICATION/EDUCATION:   The patient's plan of care was discussed with: physical therapist, registered nurse, and certified nursing assistant/patient care technician    Patient Education  Education Given To: Patient  Education Provided: Role of Therapy;ADL Adaptive Strategies;Transfer Training;Fall Prevention Strategies  Education Provided Comments: RW use with SPT, safety  Education Method: Demonstration;Verbal;Teach Back  Barriers to Learning: Cognition  Education Outcome: Verbalized understanding;Demonstrated understanding;Continued education needed    Thank you for this referral.  Jeannine Valentin, OT  Minutes: 42

## 2024-02-27 NOTE — PLAN OF CARE
Problem: Discharge Planning  Goal: Discharge to home or other facility with appropriate resources  2/27/2024 1034 by Andrzej England RN  Outcome: Progressing  Flowsheets (Taken 2/27/2024 0953)  Discharge to home or other facility with appropriate resources: Identify barriers to discharge with patient and caregiver  2/27/2024 0427 by Filomena Gaona RN  Outcome: Progressing     Problem: Pain  Goal: Verbalizes/displays adequate comfort level or baseline comfort level  2/27/2024 1034 by Andrzej England RN  Outcome: Progressing  2/27/2024 0427 by Filomena Gaona RN  Outcome: Progressing  Flowsheets (Taken 2/27/2024 0043)  Verbalizes/displays adequate comfort level or baseline comfort level: Encourage patient to monitor pain and request assistance     Problem: Safety - Adult  Goal: Free from fall injury  2/27/2024 1034 by Andrzej England RN  Outcome: Progressing  2/27/2024 0427 by Filomena Gaona RN  Outcome: Progressing

## 2024-02-27 NOTE — CONSULTS
Comprehensive Nutrition Assessment    Type and Reason for Visit: Reassess, Consult    Nutrition Recommendations/Plan:     If proceeding with tube feeds, would consider post-pyloric DHT given her ongoing N/V.    If above- recommend: Vital 1.5 to goal of 50 mL/hr with 30 mL H2O flushes q 4 hours  Start low (10-20 mL/hr) and advance slowly by 10 mL q 12-24 hours to monitor tolerance and electrolytes  Please give daily thiamine    Agree with trying scheduled anti-emetics.  Defer to MD/ GI if benzos are worth trialing.    Agree with palliative involvement given this recurrent issue that continues to land pt in hospital.     Continue clears and advance as tolerated.  Will d/c Ensure Clear.  Hates all ONS.         Malnutrition Assessment:  Malnutrition Status:  Moderate malnutrition (02/23/24 1431)    Context:  Chronic Illness     Findings of the 6 clinical characteristics of malnutrition:  Energy Intake:  Unable to assess  Weight Loss:  Greater than 7.5% over 3 months     Body Fat Loss:  Mild body fat loss Triceps   Muscle Mass Loss:  Unable to assess    Fluid Accumulation:  No significant fluid accumulation     Strength:  Not Performed       Nutrition Assessment:    Past Medical History:   Diagnosis Date    Asthma     CAD (coronary artery disease)     COPD (chronic obstructive pulmonary disease) ([facility])     PCP manages    CVA (cerebral vascular accident) ([facility]) 11/01/2023    Patient presented with right-sided weakness.  Per MRI of the brain on 11/1/2023, acute versus subacute embolic infarctions involving the left MCA territory and right PICA territory    Diabetes mellitus, type 2 ([facility])     ESRD on peritoneal dialysis ([facility])     Eduardo (Tammie-PD nurse) [facility] [phone]- was on hemodialysis M-W-F Eduardo - now on PD    GERD (gastroesophageal reflux disease)     History of blood transfusion     Hyperlipidemia     Hypertension        Pt admitted with Hypomagnesemia [E83.42]  Vomiting [R11.10]  Prolonged Q-T interval on ECG  lbs 10 oz 71 kg -   10/26/2023 161 lbs 73 kg -   10/24/2023 160 lbs 9 oz 72.8 kg -   10/19/2023 187 lbs 3 oz 84.9 kg Bed scale   10/18/2023 168 lbs 10 oz 76.5 kg Bed scale   10/17/2023 168 lbs 10 oz 76.5 kg Bed scale   10/14/2023 165 lbs 9 oz 75.1 kg Bed scale   10/12/2023 154 lbs 5 oz 70 kg Estimated   10/9/2023 154 lbs 69.9 kg -   10/5/2023 154 lbs 9 oz 70.1 kg Standing scale   10/4/2023 158 lbs 5 oz 71.8 kg Standing scale   9/30/2023 174 lbs 8 oz 79.2 kg Stated   9/18/2023 177 lbs 80.3 kg -   9/15/2023 174 lbs 78.9 kg -   9/14/2023 172 lbs 13 oz 78.4 kg Standing scale   9/13/2023 174 lbs 13 oz 79.3 kg Standing scale   9/12/2023 183 lbs 14 oz 83.4 kg Standing scale   9/11/2023 177 lbs  177 lbs 13 oz 80.3 kg  80.7 kg Standing scale   9/10/2023 180 lbs 6 oz 81.8 kg Standing scale   9/9/2023 180 lbs 12 oz 82 kg Standing scale   9/8/2023 178 lbs 2 oz 80.8 kg Standing scale   9/7/2023 173 lbs 5 oz  172 lbs 6 oz 78.6 kg  78.2 kg Standing scale   9/6/2023 173 lbs 5 oz 78.6 kg Standing scale   9/5/2023 175 lbs 1 oz 79.4 kg Standing scale   9/4/2023 170 lbs 10 oz 77.4 kg Standing scale   9/3/2023 173 lbs 1 oz 78.5 kg Standing scale   9/2/2023 176 lbs 2 oz 79.9 kg Standing scale   9/1/2023 175 lbs 8 oz 79.6 kg Standing scale   8/31/2023 175 lbs 1 oz  175 lbs 1 oz 79.4 kg  79.4 kg Standing scale   8/30/2023 179 lbs 4 oz 81.3 kg Standing scale       07/24/23 89.2 kg (196 lb 10.4 oz)   07/18/23 85.3 kg (188 lb)   11/28/22 87.5 kg (193 lb)   06/17/22 91.8 kg (202 lb 4.8 oz)   05/20/22 95.7 kg (211 lb)   05/18/22 97.1 kg (214 lb)   05/17/22 98.2 kg (216 lb 9.6 oz)       Nutrition Diagnosis:   Inadequate oral intake related to altered GI function as evidenced by weight loss  Increased nutrient needs related to increase demand for energy/nutrients as evidenced by dialysis    Nutrition Interventions:   Food and/or Nutrient Delivery: Continue Current Diet, Start Tube Feeding  Nutrition Education/Counseling: Counseling

## 2024-02-27 NOTE — PROGRESS NOTES
[facility]      Nephrology  Note  Galilea Aguilar  Date of Admission : 2/21/2024    CC:  Follow up for ESRD       Assessment and Plan     ESRD on PD   -Continue with current [facility] orders.    - serial labs     Recent PD peritonitis  - cx positive for staph epi  -Completed IV antibiotics  -No further IP antibiotics planned    Hypokalemia/Mg  Severe hypoalbuminemia  -2/2 poor nutrition  -GI eval    Encephalopathy:  -?  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 Renal cysts  - GI following     Anemia of CKD  -Continue LIS    MBD of CKD  - stable     Prolonged QT interval   HTN   DM  COPD  CAD         Interval History:  Seen and examined.  Mental status slightly better.  About to eat breakfast.  Denies any nausea.  PD without issues.    Current Medications: all current  Medications have been eviewed in EPIC  Review of Systems: Review of systems not obtained due to patient factors.    Objective:  Vitals:    Vitals:    02/27/24 0341 02/27/24 0344 02/27/24 0540 02/27/24 0834   BP: 102/63   123/69   Pulse: 73 70 93 91   Resp: 18   16   Temp: 97.7 °F (36.5 °C)   98.8 °F (37.1 °C)   TempSrc: Axillary   Oral   SpO2: 95%   94%   Weight:       Height:         Intake and Output:  No intake/output data recorded.  02/25 1901 - 02/27 0700  In: 880   Out: 200     Physical Examination:  General: Confusion resolving  Neck:  Supple, no mass  Resp:  Lungs CTA B/L, no wheezing , normal respiratory effort  CV:  Regular Rate/ Rhythm,  no murmur or rub,no  LE edema  GI:  Soft, NT, PD exit site dressing D/I.  Neurologic:  Confused oriented x 1      LABS:  Recent Labs     02/27/24  0645 02/26/24  0419 02/26/24  0418 02/25/24  0220 02/24/24  0538 02/23/24  0811 02/23/24  0603 02/21/24 2012     --  136 132* 133*   < > 133* 131*   K 3.5  --  3.8 4.5 2.6*   < >

## 2024-02-27 NOTE — FLOWSHEET NOTE
Peritoneal Dialysis Disconnection / [phone]     Primary RN SBAR: Bud, RN  Patient Education: pt AMS - no family at bedside            Hepatitis B Surface Ag   Date/Time Value Ref Range Status   01/28/2024 07:09 AM <0.10 Index Final            Hep B S Ab   Date/Time Value Ref Range Status   01/31/2024 06:50 AM <3.10 mIU/mL Final       02/27/24 0205   Peritoneal Dialysis Catheter Mid lower abdomen   No placement date or time found.   Catheter Location: Mid lower abdomen   Status Deaccessed   Site Condition Clean, dry, intact   Dressing Status Clean, dry & intact   Dressing Gauze   Date of Last Dressing Change 02/26/24   Dialysis Type Continuous cycling   Exit Site Condition Good   Catheter Care Given Yes   Post-Treatment (Cycler)   Average Dwell Time (Hours:Minutes) 1:19   Lost Dwell Time (Hours:Minutes) 0:15   Effluent Appearance Clear;Yellow   Volume Gain (mL) 595 mL  (ID 52 ml + TUF (-647 ml ) =595)     Miguelangel RN at bedside to disconnect pt from [facility]CPD tx. Orders, consent, pt, and code status confirmed. Tx completed. Used cassette and bags discarded. Education and pre/post report given to primary RN.     Net fluid gain: 595 ml (primary reports pt poor intake today).  Nephrology on call, Arash Carolina NP, updated on pt condition.  No new orders received.

## 2024-02-27 NOTE — PROGRESS NOTES
[facility]  [address]  [place] [address] [phone]                     GI PROGRESS NOTE    Patient Name: Galilea Aguilar      : 1955      MRN: [id number]  Admit Date: 2024  Today's Date: 2024  CC: N/V    Subjective:     Pt feels much better today. Denies nausea and vomiting, continues with poor appetite but able to tolerate ice cream on breakfast shake, does not like apple protein supplement. Last documented BM . Reports upper back pain.     Objective:     Blood pressure 123/69, pulse 91, temperature 98.8 °F (37.1 °C), temperature source Oral, resp. rate 16, height 1.6 m (5' 3\"), weight 71.4 kg (157 lb 6.5 oz), SpO2 94 %.    Physical Exam:  General appearance: ill appearing AAF, NAD  Skin: Extremities and face reveal no rashes. No brooks erythema.   HEENT: Sclerae anicteric.   Cardiovascular: Regular rate and rhythm.  Respiratory: Comfortable breathing with no accessory muscle use.  GI: Abdomen nondistended, soft, and nontender. Normal active bowel sounds.    Rectal: Deferred   Musculoskeletal: No pitting edema of the lower legs   Neurological: Alert and Oriented        Data Review:    Recent Results (from the past 24 hour(s))   POCT Glucose    Collection Time: 24 12:58 PM   Result Value Ref Range    POC Glucose 117 65 - 117 mg/dL    Performed by: QUEENIE Patel    Ammonia    Collection Time: 24  1:39 PM   Result Value Ref Range    Ammonia 22 <32 UMOL/L   Lactic Acid    Collection Time: 24  1:39 PM   Result Value Ref Range    Lactic Acid, Plasma 2.3 (HH) 0.4 - 2.0 MMOL/L   Culture, Blood 2    Collection Time: 24  1:39 PM    Specimen: Blood   Result Value Ref Range    Special Requests NO SPECIAL REQUESTS      Culture NO GROWTH <24 HRS     POCT Glucose    Collection Time: 24  4:58 PM   Result Value Ref Range    POC Glucose 162 (H) 65 - 117 mg/dL    Performed by: OCTAVIO Salazar    POCT Glucose    Collection Time: 24  9:05

## 2024-02-27 NOTE — PLAN OF CARE
Problem: Discharge Planning  Goal: Discharge to home or other facility with appropriate resources  Outcome: Progressing     Problem: Pain  Goal: Verbalizes/displays adequate comfort level or baseline comfort level  Outcome: Progressing  Flowsheets (Taken 2/27/2024 0043)  Verbalizes/displays adequate comfort level or baseline comfort level: Encourage patient to monitor pain and request assistance     Problem: Safety - Adult  Goal: Free from fall injury  Outcome: Progressing     Problem: Chronic Conditions and Co-morbidities  Goal: Patient's chronic conditions and co-morbidity symptoms are monitored and maintained or improved  Outcome: Progressing     Problem: Nutrition Deficit:  Goal: Optimize nutritional status  Outcome: Progressing     Problem: Skin/Tissue Integrity  Goal: Absence of new skin breakdown  Description: 1.  Monitor for areas of redness and/or skin breakdown  2.  Assess vascular access sites hourly  3.  Every 4-6 hours minimum:  Change oxygen saturation probe site  4.  Every 4-6 hours:  If on nasal continuous positive airway pressure, respiratory therapy assess nares and determine need for appliance change or resting period.  Outcome: Progressing

## 2024-02-27 NOTE — PLAN OF CARE
Problem: Physical Therapy - Adult  Goal: By Discharge: Performs mobility at highest level of function for planned discharge setting.  See evaluation for individualized goals.  Description: FUNCTIONAL STATUS PRIOR TO ADMISSION: Patient was modified independent using a rolling walker for functional mobility, wheelchair for longer distances. Pt reported a fall a few days prior to this admission    HOME SUPPORT PRIOR TO ADMISSION: The patient lived with family and required assist for some ADLs.    Physical Therapy Goals  Initiated 2/23/2024  1.  Patient will move from supine to sit and sit to supine and roll side to side in bed with modified independence within 7 day(s).    2.  Patient will perform sit to stand with modified independence within 7 day(s).  3.  Patient will transfer from bed to chair and chair to bed with modified independence using the least restrictive device within 7 day(s).  4.  Patient will ambulate with supervision/set-up for 50 feet with the least restrictive device within 7 day(s).   5.  Patient will ascend/descend 4 stairs with single handrail(s) with supervision/set-up within 7 day(s).    Outcome: Progressing   PHYSICAL THERAPY TREATMENT    Patient: Galilea Aguilar (68 y.o. female)  Date: 2/27/2024  Diagnosis: Hypomagnesemia [E83.42]  Vomiting [R11.10]  Prolonged Q-T interval on ECG [R94.31]  Intractable nausea and vomiting [R11.2] Vomiting      Precautions: Contact Precautions, Fall Risk                      ASSESSMENT:  Patient continues to benefit from skilled PT services and is progressing towards goals. Barriers to independence with functional mobility include intermittent confusion, general weakness, impaired balance, increased risk for fall.      Pt received in bed, oriented x 4 but intermittently confused. Moved supine to sit with min A. Pt transferred sit to stand to RW with min A x 2 for lift/ balance; reported need to use restroom therefore BSC provided. Pt completed pivot to BSC with  chair, Call bell within reach, and Bed/ chair alarm activated      COMMUNICATION/EDUCATION:   The patient's plan of care was discussed with: registered nurse    Patient Education  Education Given To: Patient  Education Provided: Role of Therapy;Plan of Care;Fall Prevention Strategies;Transfer Training  Education Method: Verbal  Barriers to Learning: Cognition (intermittent confusion)  Education Outcome: Verbalized understanding;Continued education needed      Virginia Lu, PT  Minutes: 31

## 2024-02-27 NOTE — PROGRESS NOTES
Pharmacist Note - IP Vancomycin Dosing  Indication: Peritonitis (started vancomycin on 1/26/24 PTA)  Current regimen: IP Dosing by Level    Recent Labs     02/25/24  0220 02/26/24  0418 02/27/24  0645   WBC 7.3 6.5  --    CREATININE 5.38* 4.96* 4.84*   BUN 15 16 17     A vancomycin level of 16.7 mcg/mL was obtained ~7 days from last outpatient dose of IP vancomycin on 2/20/24.    Goal target range Trough 10-15 mcg/mL      Plan: No Vancomycin in IP bag is due today. Repeat level tomorrow morning. Pharmacy will continue to monitor this patient daily for changes in clinical status and renal function.

## 2024-02-27 NOTE — FLOWSHEET NOTE
CCPD Connection: 02/27/24 1710   Vitals   /70   Temp 97.9 °F (36.6 °C)   Pulse 88   Respirations 18   Observations & Evaluations   Level of Consciousness 0   Oriented X   (confused)   Heart Rhythm   (remote tele)   Respiratory Quality/Effort Unlabored   O2 Device None (Room air)   Bilateral Breath Sounds Clear   Skin Condition/Temp Warm;Dry   Abdomen Inspection Rounded;Soft   Edema None   Peritoneal Dialysis Catheter Mid lower abdomen   No placement date or time found.   Catheter Location: Mid lower abdomen   Status Accessed   Site Condition Clean, dry, intact   Dressing Status Clean, dry & intact   Dressing Gauze   Date of Last Dressing Change 02/27/24   Dialysis Type Continuous cycling   Exit Site Condition excellent   Catheter Care Given Yes   Cycler   Verification of Prescription CCPD   Informed Consent  Yes  (chronic consent applies)   Total Volume Programmed 31391 mL   Therapy Time (Hours:Minutes) 9:30  (+ 2hr dwell for specimen collection)   Cycler Type Jung HomeChoice   Fill Volume 2500 mL   Last Fill Volume 1000 mL  (one time for specimen collection)   Dextrose Setting Same (Nonextraneal)   I Drain Alarm 0 mL   Number of Cycles 5   Bag Volume 6000 mL   Number of Bags Used 3   Dianeal Solution   (Dextrose 1.5% in 6000 mL)     Hepatitis B Surface Ag   Date/Time Value Ref Range Status   02/26/2024 04:19 AM <0.10 Index Final     Hep B S Ab   Date/Time Value Ref Range Status   01/31/2024 06:50 AM <3.10 mIU/mL Final     Time Out (time): 1650  Primary RN SBAR: DEBBIE England, RN  Patient Education: Infection prevention  Comments: Remained present for completion of initial drain and initiation of first fill. Last fill added for specimen collection in AM.     Start time: 1715 (2/27/24)  Est end time: 0445 (2/28/24)

## 2024-02-27 NOTE — PROGRESS NOTES
Hospitalist Progress Note  Maya Guzman MD  Answering service: [phone] OR [phone] from in house phone        Date of Service:  2024  NAME:  Galilea Aguilar  :  1955  MRN:  [id number]      Admission Summary:   HPI: \"Galilea Aguilar is a 68 y.o. female with a history of CAD, COPD, CVA, diabetes, ESRD on peritoneal dialysis who presented to the ED with chief complaint of vomiting and left side/flank pain. In the ED, labs consistent with known renal dysfunction. QTc was found to be prolonged (554). CT demonstrated a large right retroperitoneal mass though has been present on several CT scans since . Patient treated with IV Valium for nausea and referred to hospitalists for further management.      Patient was seen and examined this morning in the ED, she was lying in bed in no acute distress -though did notice her having \"dry heaves\". Patient was recently admitted from  - 2024 for generalized abdominal pain, diagnosed with severe sepsis secondary to peritonitis and was discharged on vancomycin to be given through her peritoneal dialysis fluid and has also been prescribed Diflucan daily for the next month while on antibiotics.  She reports that since she has been discharged, she has had nausea and vomiting that has been persistent and has had difficulty keeping anything down.  Denies any headaches or dizziness, no chest pain or pressure, no shortness of breath or coughing.  Reports no diarrhea or constipation at home and has no abdominal pain.       Medication reconciliation completed with patient at bedside.      Addendum 1540:   - Tylenol not sufficient  for left sided flank discomfort  - give low dose morphine as she is having difficulty tolerating po. Was given 4 mg last night.  - add compazine as alternate nausea mediction\"       Interval history / Subjective:   Patient seen examined at bedside  IntraPERitoneal Daily    phenylephrine-mineral oil-petrolatum (PREPARATION H) rectal ointment   Rectal BID PRN    dextrose bolus 10% 125 mL  125 mL IntraVENous PRN    Or    dextrose bolus 10% 250 mL  250 mL IntraVENous PRN    glucagon injection 1 mg  1 mg SubCUTAneous PRN    dextrose 10 % infusion   IntraVENous Continuous PRN    insulin lispro (HUMALOG) injection vial 0-4 Units  0-4 Units SubCUTAneous TID WC    insulin lispro (HUMALOG) injection vial 0-4 Units  0-4 Units SubCUTAneous Nightly    gentamicin (GARAMYCIN) 0.1 % cream   Topical PRN    prochlorperazine (COMPAZINE) injection 5 mg  5 mg IntraVENous Q6H PRN    scopolamine (TRANSDERM-SCOP) transdermal patch 1 patch  1 patch TransDERmal Q72H     ______________________________________________________________________  EXPECTED LENGTH OF STAY: Unable to retrieve estimated LOS  ACTUAL LENGTH OF STAY:          5                 Maya Guzman MD

## 2024-02-28 ENCOUNTER — APPOINTMENT (OUTPATIENT)
Facility: HOSPITAL | Age: 69
End: 2024-02-28
Payer: MEDICARE

## 2024-02-28 LAB
ALBUMIN SERPL-MCNC: 1.5 G/DL (ref 3.5–5)
ALBUMIN/GLOB SERPL: 0.4 (ref 1.1–2.2)
ALP SERPL-CCNC: 131 U/L (ref 45–117)
ALT SERPL-CCNC: 10 U/L (ref 12–78)
ANION GAP SERPL CALC-SCNC: 8 MMOL/L (ref 5–15)
APPEARANCE FLD: CLEAR
AST SERPL-CCNC: 10 U/L (ref 15–37)
BILIRUB DIRECT SERPL-MCNC: 0.2 MG/DL (ref 0–0.2)
BILIRUB SERPL-MCNC: 0.5 MG/DL (ref 0.2–1)
BUN SERPL-MCNC: 20 MG/DL (ref 6–20)
BUN/CREAT SERPL: 4 (ref 12–20)
CALCIUM SERPL-MCNC: 7.8 MG/DL (ref 8.5–10.1)
CHLORIDE SERPL-SCNC: 97 MMOL/L (ref 97–108)
CO2 SERPL-SCNC: 30 MMOL/L (ref 21–32)
COLOR FLD: COLORLESS
CREAT SERPL-MCNC: 4.9 MG/DL (ref 0.55–1.02)
ERYTHROCYTE [DISTWIDTH] IN BLOOD BY AUTOMATED COUNT: 17.5 % (ref 11.5–14.5)
GLOBULIN SER CALC-MCNC: 4.2 G/DL (ref 2–4)
GLUCOSE BLD STRIP.AUTO-MCNC: 186 MG/DL (ref 65–117)
GLUCOSE BLD STRIP.AUTO-MCNC: 196 MG/DL (ref 65–117)
GLUCOSE BLD STRIP.AUTO-MCNC: 203 MG/DL (ref 65–117)
GLUCOSE BLD STRIP.AUTO-MCNC: 247 MG/DL (ref 65–117)
GLUCOSE BLD STRIP.AUTO-MCNC: 263 MG/DL (ref 65–117)
GLUCOSE SERPL-MCNC: 191 MG/DL (ref 65–100)
HCT VFR BLD AUTO: 23.5 % (ref 35–47)
HGB BLD-MCNC: 7.5 G/DL (ref 11.5–16)
MAGNESIUM SERPL-MCNC: 1.8 MG/DL (ref 1.6–2.4)
MCH RBC QN AUTO: 28.4 PG (ref 26–34)
MCHC RBC AUTO-ENTMCNC: 31.9 G/DL (ref 30–36.5)
MCV RBC AUTO: 89 FL (ref 80–99)
NRBC # BLD: 0 K/UL (ref 0–0.01)
NRBC BLD-RTO: 0 PER 100 WBC
NUC CELL # FLD: 2 /CU MM
PHOSPHATE SERPL-MCNC: 4 MG/DL (ref 2.6–4.7)
PLATELET # BLD AUTO: 129 K/UL (ref 150–400)
PMV BLD AUTO: 10.3 FL (ref 8.9–12.9)
POTASSIUM SERPL-SCNC: 3.6 MMOL/L (ref 3.5–5.1)
PROT SERPL-MCNC: 5.7 G/DL (ref 6.4–8.2)
RBC # BLD AUTO: 2.64 M/UL (ref 3.8–5.2)
RBC # FLD: 3 /CU MM
SERVICE CMNT-IMP: ABNORMAL
SODIUM SERPL-SCNC: 135 MMOL/L (ref 136–145)
SPECIMEN SOURCE FLD: ABNORMAL
TRIGL SERPL-MCNC: 85 MG/DL
VANCOMYCIN SERPL-MCNC: 13.9 UG/ML
WBC # BLD AUTO: 3.4 K/UL (ref 3.6–11)

## 2024-02-28 PROCEDURE — A4216 STERILE WATER/SALINE, 10 ML: HCPCS | Performed by: NURSE PRACTITIONER

## 2024-02-28 PROCEDURE — 2580000003 HC RX 258: Performed by: NURSE PRACTITIONER

## 2024-02-28 PROCEDURE — 80076 HEPATIC FUNCTION PANEL: CPT

## 2024-02-28 PROCEDURE — 6370000000 HC RX 637 (ALT 250 FOR IP)

## 2024-02-28 PROCEDURE — 80048 BASIC METABOLIC PNL TOTAL CA: CPT

## 2024-02-28 PROCEDURE — 6360000002 HC RX W HCPCS: Performed by: NURSE PRACTITIONER

## 2024-02-28 PROCEDURE — 6360000002 HC RX W HCPCS: Performed by: STUDENT IN AN ORGANIZED HEALTH CARE EDUCATION/TRAINING PROGRAM

## 2024-02-28 PROCEDURE — 6370000000 HC RX 637 (ALT 250 FOR IP): Performed by: STUDENT IN AN ORGANIZED HEALTH CARE EDUCATION/TRAINING PROGRAM

## 2024-02-28 PROCEDURE — 80202 ASSAY OF VANCOMYCIN: CPT

## 2024-02-28 PROCEDURE — 2060000000 HC ICU INTERMEDIATE R&B

## 2024-02-28 PROCEDURE — 82962 GLUCOSE BLOOD TEST: CPT

## 2024-02-28 PROCEDURE — C9113 INJ PANTOPRAZOLE SODIUM, VIA: HCPCS | Performed by: NURSE PRACTITIONER

## 2024-02-28 PROCEDURE — 93005 ELECTROCARDIOGRAM TRACING: CPT | Performed by: STUDENT IN AN ORGANIZED HEALTH CARE EDUCATION/TRAINING PROGRAM

## 2024-02-28 PROCEDURE — 94640 AIRWAY INHALATION TREATMENT: CPT

## 2024-02-28 PROCEDURE — 74177 CT ABD & PELVIS W/CONTRAST: CPT

## 2024-02-28 PROCEDURE — 6360000004 HC RX CONTRAST MEDICATION: Performed by: RADIOLOGY

## 2024-02-28 PROCEDURE — 84100 ASSAY OF PHOSPHORUS: CPT

## 2024-02-28 PROCEDURE — 6370000000 HC RX 637 (ALT 250 FOR IP): Performed by: NURSE PRACTITIONER

## 2024-02-28 PROCEDURE — 36415 COLL VENOUS BLD VENIPUNCTURE: CPT

## 2024-02-28 PROCEDURE — 2580000003 HC RX 258: Performed by: INTERNAL MEDICINE

## 2024-02-28 PROCEDURE — 85027 COMPLETE CBC AUTOMATED: CPT

## 2024-02-28 PROCEDURE — 74018 RADEX ABDOMEN 1 VIEW: CPT

## 2024-02-28 PROCEDURE — 83735 ASSAY OF MAGNESIUM: CPT

## 2024-02-28 PROCEDURE — 89050 BODY FLUID CELL COUNT: CPT

## 2024-02-28 PROCEDURE — 84478 ASSAY OF TRIGLYCERIDES: CPT

## 2024-02-28 RX ORDER — SODIUM CHLORIDE 9 MG/ML
INJECTION, SOLUTION INTRAVENOUS CONTINUOUS
Status: DISCONTINUED | OUTPATIENT
Start: 2024-02-28 | End: 2024-03-01

## 2024-02-28 RX ORDER — TROLAMINE SALICYLATE 10 G/100G
CREAM TOPICAL 2 TIMES DAILY PRN
Status: DISCONTINUED | OUTPATIENT
Start: 2024-02-28 | End: 2024-03-16 | Stop reason: HOSPADM

## 2024-02-28 RX ORDER — ROSUVASTATIN CALCIUM 10 MG/1
10 TABLET, COATED ORAL NIGHTLY
Status: DISCONTINUED | OUTPATIENT
Start: 2024-02-28 | End: 2024-03-16 | Stop reason: HOSPADM

## 2024-02-28 RX ORDER — ROSUVASTATIN CALCIUM 40 MG/1
40 TABLET, COATED ORAL NIGHTLY
Status: DISCONTINUED | OUTPATIENT
Start: 2024-02-28 | End: 2024-02-28

## 2024-02-28 RX ADMIN — Medication 3 MG: at 22:51

## 2024-02-28 RX ADMIN — CARVEDILOL 6.25 MG: 6.25 TABLET, FILM COATED ORAL at 17:12

## 2024-02-28 RX ADMIN — MONTELUKAST 10 MG: 10 TABLET, FILM COATED ORAL at 22:51

## 2024-02-28 RX ADMIN — DULOXETINE HYDROCHLORIDE 20 MG: 20 CAPSULE, DELAYED RELEASE ORAL at 22:51

## 2024-02-28 RX ADMIN — ACETAMINOPHEN 650 MG: 325 TABLET ORAL at 22:52

## 2024-02-28 RX ADMIN — DULOXETINE HYDROCHLORIDE 20 MG: 20 CAPSULE, DELAYED RELEASE ORAL at 09:23

## 2024-02-28 RX ADMIN — GENTAMICIN SULFATE: 1 CREAM TOPICAL at 17:41

## 2024-02-28 RX ADMIN — SODIUM CHLORIDE, SODIUM LACTATE, CALCIUM CHLORIDE, MAGNESIUM CHLORIDE AND DEXTROSE 6000 ML: 1.5; 538; 448; 18.3; 5.08 INJECTION, SOLUTION INTRAPERITONEAL at 17:42

## 2024-02-28 RX ADMIN — ARFORMOTEROL TARTRATE: 15 SOLUTION RESPIRATORY (INHALATION) at 19:52

## 2024-02-28 RX ADMIN — Medication 10 ML: at 09:26

## 2024-02-28 RX ADMIN — SODIUM CHLORIDE, SODIUM LACTATE, CALCIUM CHLORIDE, MAGNESIUM CHLORIDE AND DEXTROSE 6000 ML: 1.5; 538; 448; 18.3; 5.08 INJECTION, SOLUTION INTRAPERITONEAL at 17:41

## 2024-02-28 RX ADMIN — INSULIN LISPRO 2 UNITS: 100 INJECTION, SOLUTION INTRAVENOUS; SUBCUTANEOUS at 17:14

## 2024-02-28 RX ADMIN — APIXABAN 2.5 MG: 2.5 TABLET, FILM COATED ORAL at 09:23

## 2024-02-28 RX ADMIN — APIXABAN 2.5 MG: 2.5 TABLET, FILM COATED ORAL at 22:51

## 2024-02-28 RX ADMIN — PREDNISONE 50 MG: 20 TABLET ORAL at 09:21

## 2024-02-28 RX ADMIN — SODIUM CHLORIDE: 9 INJECTION, SOLUTION INTRAVENOUS at 22:51

## 2024-02-28 RX ADMIN — Medication 10 ML: at 22:52

## 2024-02-28 RX ADMIN — DIPHENHYDRAMINE HYDROCHLORIDE 50 MG: 50 INJECTION INTRAMUSCULAR; INTRAVENOUS at 09:20

## 2024-02-28 RX ADMIN — IOPAMIDOL 100 ML: 755 INJECTION, SOLUTION INTRAVENOUS at 10:39

## 2024-02-28 RX ADMIN — ASPIRIN 81 MG CHEWABLE TABLET 81 MG: 81 TABLET CHEWABLE at 09:23

## 2024-02-28 RX ADMIN — CARVEDILOL 6.25 MG: 6.25 TABLET, FILM COATED ORAL at 07:52

## 2024-02-28 RX ADMIN — SODIUM CHLORIDE: 9 INJECTION, SOLUTION INTRAVENOUS at 14:42

## 2024-02-28 RX ADMIN — ARFORMOTEROL TARTRATE: 15 SOLUTION RESPIRATORY (INHALATION) at 08:27

## 2024-02-28 RX ADMIN — ROSUVASTATIN CALCIUM 10 MG: 10 TABLET, FILM COATED ORAL at 22:51

## 2024-02-28 RX ADMIN — PANTOPRAZOLE SODIUM 40 MG: 40 INJECTION, POWDER, FOR SOLUTION INTRAVENOUS at 09:26

## 2024-02-28 ASSESSMENT — PAIN SCALES - GENERAL: PAINLEVEL_OUTOF10: 0

## 2024-02-28 NOTE — FLOWSHEET NOTE
PD DISCONNECTION   02/28/24 0630   Vitals   BP (!) 149/84   Temp 97.5 °F (36.4 °C)   Temp Source Oral   Pulse 84   Respirations 18   SpO2 99 %   Observations & Evaluations   Level of Consciousness 0   Oriented X appropriate   Respiratory Quality/Effort Unlabored   O2 Device None (Room air)   Skin Condition/Temp Warm;Dry   Appetite Nausea   Abdomen Inspection Rounded   Edema None   Peritoneal Dialysis Catheter Mid lower abdomen   No placement date or time found.   Catheter Location: Mid lower abdomen   Status Deaccessed   Dressing Status Clean, dry & intact   Dressing Gauze   Date of Last Dressing Change 02/27/24   Dialysis Type Continuous cycling   Catheter Care Given Yes  (one minute alcavis scrub followed by one minute soak and sterile mini cap)   Post-Treatment (Cycler)   Average Dwell Time (Hours:Minutes) tx terminated in drain 3/5 due to low drain volume alarm   Effluent Appearance Clear;Yellow  (visualized in toilet, specimen obtained on drain clear;yellow)   I Drain (mL) 118 mL   PD Output (mL) 73 mL  ( + -UF of 45 = NET UF 73)     Primary RN SBAR: SHAGUFTA Luther RN   Comments: On arrival patient in drain 3/5 with low drain alarm, repositioned and continued to drain approximately 500 ml more, specimen collected, patient for CT of the abdomen this am at 700, reviewed with nephrology and treatment treatment terminated at this time.  Unable to obtain dwell/loss time.  Alarms: Low drain volume.      On departure, SBAR with primary nurse, bed in low position, call bell within reach.

## 2024-02-28 NOTE — PROGRESS NOTES
[facility]  [address]  [place] [address] [phone]                     GI PROGRESS NOTE    Patient Name: Galilea Aguilar      : 1955      MRN: [id number]  Admit Date: 2024  Today's Date: 2024  CC: N/V    Subjective:     Pt resting comfortably in bed. Denies nausea and vomiting currently, able to tolerate jello for lunch. Continues with nocturnal episodes of vomiting. Loose bowel movement last night. Having L sided abd pain 9/10.     Objective:     Blood pressure (!) 143/74, pulse 80, temperature 97.7 °F (36.5 °C), temperature source Oral, resp. rate 18, height 1.6 m (5' 3\"), weight 71.4 kg (157 lb 6.5 oz), SpO2 100 %.    Physical Exam:  General appearance: ill appearing AAF, NAD  Skin: Extremities and face reveal no rashes. No brooks erythema.   HEENT: Sclerae anicteric.   Cardiovascular: Regular rate and rhythm.  Respiratory: Comfortable breathing with no accessory muscle use.  GI: Abdomen nondistended, soft, and nontender. Normal active bowel sounds.    Rectal: Deferred   Musculoskeletal: No pitting edema of the lower legs   Neurological: Alert and Oriented        Data Review:    Recent Results (from the past 24 hour(s))   TSH + Free T4 Panel    Collection Time: 24  3:23 PM   Result Value Ref Range    TSH, 3RD GENERATION 3.28 0.36 - 3.74 uIU/mL    T4 Free 1.0 0.8 - 1.5 NG/DL   T3, Free    Collection Time: 24  3:23 PM   Result Value Ref Range    T3, Free 1.1 (L) 2.2 - 4.0 pg/mL   POCT Glucose    Collection Time: 24  5:36 PM   Result Value Ref Range    POC Glucose 133 (H) 65 - 117 mg/dL    Performed by: DARA CHAKRABORTY    POCT Glucose    Collection Time: 24  8:44 PM   Result Value Ref Range    POC Glucose 190 (H) 65 - 117 mg/dL    Performed by: LEONARD Daly    POCT Glucose    Collection Time: 24 12:49 AM   Result Value Ref Range    POC Glucose 247 (H) 65 - 117 mg/dL    Performed by: DILCIA Zimmerman    POCT Glucose    Collection             Patient Active Hospital Problem List:   Principal Problem:    Vomiting  Active Problems:    Intractable nausea and vomiting    Type 2 diabetes with nephropathy (HCC)    Palliative care encounter    Altered mental status    Advanced care planning/counseling discussion  Resolved Problems:    * No resolved hospital problems. *      Time Spent with Patient: 20 mins  DAYA Watkins - NP

## 2024-02-28 NOTE — WOUND CARE
Wound Care Note:       New consult placed by nurse request for wound on left toe    Chart shows:  Admitted for vomiting  Past Medical History:   Diagnosis Date    Asthma     CAD (coronary artery disease)     COPD (chronic obstructive pulmonary disease) ([facility])     PCP manages    CVA (cerebral vascular accident) ([facility]) 11/01/2023    Patient presented with right-sided weakness.  Per MRI of the brain on 11/1/2023, acute versus subacute embolic infarctions involving the left MCA territory and right PICA territory    Diabetes mellitus, type 2 ([facility])     ESRD on peritoneal dialysis ([facility])     Eduardo (Tammie-PD nurse) Shelley [phone]- was on hemodialysis M-W-F Eduardo - now on PD    GERD (gastroesophageal reflux disease)     History of blood transfusion     Hyperlipidemia     Hypertension    WBC = 3.4 on 2/28/24  Admitted from home    Assessment:   Patient is alert and talking, incontinent with no assistance needed in repositioning.    Bed: [place]  Patient wearing briefs for incontinence.    Diet: Adult diet clear liquid  Patient reports some pain on left side.      Bilateral heels, buttocks, and sacral skin intact and without erythema.    Palpable DP pulses bilaterally.      1. Left 5th toe medial aspect with fissure, approximately 1 cm x 0.4 cm x 0.1 cm, wound bed is pink, wound edges are open.  Z guard paste applied.    Spoke with Dr. Guzman, wound care orders obtained.    Patient repositioned supine.  Heels offloaded on pillow.     Recommendations:    Left medial left toe at base- Every 12 hours apply zinc oxide (orange tube).    Skin Care & Pressure Prevention:  Minimize layers of linen/pads under patient to optimize support surface.    Turn/reposition approximately every 2 hours and offload heels.  Manage incontinence / promote continence   Nourishing Skin Cream to dry skin, minimize use of briefs when able    Discussed above plan with patient &  Alivia  RN    Transition of Care: Plan to follow as needed while admitted to hospital.    Sally \"Shae\" MARIELA Tello, RN, CWON  Certified Wound and Ostomy Nurse  office [phone]  Best way to contact me is through Perfect Serve   Quality 130: Documentation Of Current Medications In The Medical Record: Current Medications Documented Detail Level: Generalized

## 2024-02-28 NOTE — PROGRESS NOTES
[facility] Hospitalist cross coverage (7p-7a) progress note:    Plan for CT scan at 6am, RN to premedicate and  contrast at 4a.      Sumi MCKEON

## 2024-02-28 NOTE — PROGRESS NOTES
[facility]      Nephrology  Note  Galilea Aguilar  Date of Admission : 2/21/2024    CC:  Follow up for ESRD       Assessment and Plan     ESRD on PD   - Continue with current CCPD orders.    - N+V unlikely 2/2 uremia   - dehydrated --> resume IVF     Recent PD peritonitis  - cx positive for staph epi  -Completed IV antibiotics  -No further IP antibiotics planned    Hypokalemia/Mg  Severe hypoalbuminemia  -2/2 poor nutrition  -GI eval    Encephalopathy:  -?  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 Renal cysts  - for post pylori feeding tube     Anemia of CKD  -Continue LIS    MBD of CKD  - stable     Prolonged QT interval   HTN   DM  COPD  CAD         Interval History:  Seen and examined. DHT placed for TF but not in position.Tube being advanced. Had low flow alarms with PD overnight due to dehydration     Current Medications: all current  Medications have been eviewed in EPIC  Review of Systems: Review of systems not obtained due to patient factors.    Objective:  Vitals:    Vitals:    02/28/24 0630 02/28/24 0842 02/28/24 1202 02/28/24 1206   BP: (!) 149/84 130/74 (!) 143/74    Pulse: 84 80 82 80   Resp: 18 18 18    Temp: 97.5 °F (36.4 °C) 98.1 °F (36.7 °C) 97.7 °F (36.5 °C)    TempSrc: Oral Oral Oral    SpO2: 99% 100% 100%    Weight:       Height:         Intake and Output:  02/28 0701 - 02/28 1900  In: 50 [P.O.:50]  Out: -   02/26 1901 - 02/28 0700  In: 1193 [P.O.:598]  Out: 273     Physical Examination:  General: Confusion resolving  Neck:  Supple, no mass  Resp:  Lungs CTA B/L, no wheezing , normal respiratory effort  CV:  Regular Rate/ Rhythm,  no murmur or rub,no  LE edema  GI:  Soft, NT, PD exit site dressing D/I.  Neurologic:  Confused oriented x 1      LABS:  Recent Labs     02/28/24  0733 02/27/24  0645 02/26/24  0419 02/26/24  0418  02/25/24  0220 02/24/24  0538 02/23/24  0811 02/23/24  0603   * 138  --  136   < > 133*   < > 133*   K 3.6 3.5  --  3.8   < > 2.6*   < > HEMOLYZED,RECOLLECT REQUESTED   CL 97 100  --  100   < > 99   < > 100   CO2 30 31  --  30   < > 28   < > 26   BUN 20 17  --  16   < > 14   < > 14   CREATININE 4.90* 4.84*  --  4.96*   < > 5.04*   < > 5.29*   CALCIUM 7.8* 7.7*  --  7.4*   < > 7.1*   < > 6.9*   LABALBU 1.5* 1.6* 1.6* 1.4*   < > 1.3*  --   --    PHOS 4.0 3.0  --  2.5*   < > 1.9*  --   --    MG 1.8  --   --   --   --  1.7  --  HEMOLYZED,RECOLLECT REQUESTED    < > = values in this interval not displayed.       Recent Labs     02/28/24  0733 02/27/24  0645 02/26/24  0418   WBC 3.4* 7.1 6.5   HGB 7.5* 7.9* 8.2*   HCT 23.5* 24.2* 25.9*   * 141* 140*       No results for input(s): \"WINSTON\", \"KU\", \"CLU\", \"CREAU\" in the last 720 hours.    Invalid input(s): \"PROU\"  No results found for: \"SDES\"  No components found for: \"CULT\"      Review of Medical Records: I have reviewed all pertinent labs, chart notes, microbiology data, radiology imaging this patient.  Medications list personally reviewed.  No change in PMH ,family and social history from Consult note.      Adria Mary MD  [facility] [address], [place] [address]  Phone: [phone]   [phone]

## 2024-02-28 NOTE — PROGRESS NOTES
earlier, a little more alert today, less confused, spoke to daughter over the phone       Assessment & Plan:      Nausea and Committing with recent Peritonitis  Retroperitoneal Mass  -Appreciate GI's thorough eval has had chronic nausea vomiting CT abdomen no acute pathology has had unremarkable EGD October 2023 question if Trulicity is possible culture given no gastroparesis  - Hydrate due to N/V  -Did not tolerate diet advancement from CLD to full transition back to clears, ANDREA Marinol patient having fulminant hallucinations on it,  on scopolamine patch, discussed with GI repeat EKG shows improved QTc , scheduled Zofran will place Dobbhoff consulted nutrition to start tube feeds  -Discussed with GI 2/20 Dobbhoff needs to be postpyloric, may need IR to advance? Defer to GI/IR, will start tube feeds after Dobbhoff and appropriate positioning  - GI consulted for persistent N/V since discharge several weeks ago - no hematemesis, prn antiemetics , advance diet per them  -consult diabetic management team to come up with alternative for diabetic regimen  -Discussed with nephro cleared to get IV contrast study , needs to be premedicated, consulted pharmacy for time protocol for premedication prior to CT scan needs to be coordinated with RN to be done today    Acute metabolic encephalopathy  - Suspect secondary to Marinol, dc'd this, mentation seems to be a little improved, but still somewhat confused, can take time as patient on PD        Mechanical fall overnight 2/24  -no signs of injury, ct head neg   -sitter in place    Prolonged Qtc improved   - holding fluconazole  - monitor on tele     Hyponatremia  Hypokalemia  Hypomagnesemia  - replete with /supplemental K per nephro patient on PD      Hx ESRD on PD  Recent history of peritonitis and on antibiotics PTA  - no fevers, no leukocytosis  - nephrology has been consulted and seen - to re-order PD fluid and plan to sample abdominal dwell fluid  - Patient was on vancomycin  20,000 Units SubCUTAneous Weekly    lidocaine 4 % external patch 1 patch  1 patch TransDERmal Daily    sodium chloride flush 0.9 % injection 5-40 mL  5-40 mL IntraVENous 2 times per day    sodium chloride flush 0.9 % injection 5-40 mL  5-40 mL IntraVENous PRN    0.9 % sodium chloride infusion   IntraVENous PRN    polyethylene glycol (GLYCOLAX) packet 17 g  17 g Oral Daily PRN    acetaminophen (TYLENOL) tablet 650 mg  650 mg Oral Q6H PRN    Or    acetaminophen (TYLENOL) suppository 650 mg  650 mg Rectal Q6H PRN    dianeal lo-brandi 1.5% 6,000 mL  6,000 mL IntraPERitoneal Daily    dianeal lo-brandi 1.5% 6,000 mL  6,000 mL IntraPERitoneal Daily    dianeal lo-brandi 1.5% 6,000 mL  6,000 mL IntraPERitoneal Daily    phenylephrine-mineral oil-petrolatum (PREPARATION H) rectal ointment   Rectal BID PRN    dextrose bolus 10% 125 mL  125 mL IntraVENous PRN    Or    dextrose bolus 10% 250 mL  250 mL IntraVENous PRN    glucagon injection 1 mg  1 mg SubCUTAneous PRN    dextrose 10 % infusion   IntraVENous Continuous PRN    insulin lispro (HUMALOG) injection vial 0-4 Units  0-4 Units SubCUTAneous TID WC    insulin lispro (HUMALOG) injection vial 0-4 Units  0-4 Units SubCUTAneous Nightly    gentamicin (GARAMYCIN) 0.1 % cream   Topical PRN    prochlorperazine (COMPAZINE) injection 5 mg  5 mg IntraVENous Q6H PRN    scopolamine (TRANSDERM-SCOP) transdermal patch 1 patch  1 patch TransDERmal Q72H     ______________________________________________________________________  EXPECTED LENGTH OF STAY: Unable to retrieve estimated LOS  ACTUAL LENGTH OF STAY:          6                 Maya Guzman MD

## 2024-02-28 NOTE — PROGRESS NOTES
Renal Dosing/Monitoring  Medication: Rosuvastatin   Current regimen:  40 mg every 24 hr  Recent Labs     02/26/24  0418 02/27/24  0645 02/28/24  0733   CREATININE 4.96* 4.84* 4.90*   BUN 16 17 20     Estimated CrCl:  10 ml/min  Plan: Change to 10 mg q 24 hours for patient's with crcl <30 mL/min

## 2024-02-28 NOTE — FLOWSHEET NOTE
02/28/24 1750   Observations & Evaluations   Level of Consciousness 0   Oriented X 3  (intermit confusion)   Respiratory Quality/Effort Unlabored   O2 Device None (Room air)   Bilateral Breath Sounds Clear;Diminished   Skin Color   (appropriate for ethnicity)   Skin Condition/Temp Warm;Dry   Abdomen Inspection Soft;Rounded   Bowel Sounds (All Quadrants) Active   Edema None   Peritoneal Dialysis Catheter Mid lower abdomen   No placement date or time found.   Catheter Location: Mid lower abdomen   Status Accessed   Site Condition Clean, dry, intact   Dressing Status New dressing applied   Dressing Gauze   Date of Last Dressing Change 02/28/24   Dialysis Type Continuous cycling   Exit Site Condition good   Catheter Care Given Yes   Cycler   Verification of Prescription CCPD   Informed Consent    (chronic consent applies)   Total Volume Programmed 46267 mL   Therapy Time (Hours:Minutes) 9:30   Cycler Type Jung HomeChoice   Fill Volume 2500 mL   Last Fill Volume 0 mL   I Drain Alarm 0 mL   Number of Cycles 5   Bag Volume 6000 mL   Number of Bags Used 3   Dianeal Solution   (3-1.5% Dianeal in 6000ml)        02/28/24 1750   Treatment   Time On 1750   Time Off 0320   Observations & Evaluations   Level of Consciousness 0   Oriented X 3  (intermit confusion)   Respiratory Quality/Effort Unlabored   O2 Device None (Room air)   Bilateral Breath Sounds Clear;Diminished   Skin Color   (appropriate for ethnicity)   Skin Condition/Temp Warm;Dry   Abdomen Inspection Soft;Rounded   Bowel Sounds (All Quadrants) Active   Edema None   Pain Assessment   Pain Assessment Face, Legs, Activity, Cry, and Consolability (FLACC)   Technical Checks   ICEBOAT I;C;E;B;O;A;T   Faces, Legs, Activity, Cry, and Consolability (FLACC)   Face (F) 1   Legs (L) 0   Activity (A) 0   Cry (C) 0   Consolability (C) 0   FLACC Score  1     Primary RN SBAR: Alivia Lambert, JANES  Patient Education: procedural, plan of care, safety while on dialysis  Hospital  associated wait time; reason: n/a  Hepatitis B Surface Ag   Date/Time Value Ref Range Status   02/26/2024 04:19 AM <0.10 Index Final     Hep B S Ab   Date/Time Value Ref Range Status   01/31/2024 06:50 AM <3.10 mIU/mL Final     Pt orders, notes, labs and code status reviewed. Time out complete. CCPD initiated as per MD order. Remained present during initial drain following by first fill.  Upon departure, bed in lowest position, call bell and personal items within reach. Fall prevention monitor system in  place.

## 2024-02-28 NOTE — PROGRESS NOTES
0720: Sent via [employer] 2000 secure chat to Dr. Guzman: \" Pt is having a hard time tolerating PO contrast through dobhoff. Only pushed about 30cc slowly and pt instantly began to cough and gag. Pt now feeling nauseous. Pt is upright, KUB done on 2/27 1500. Is she able to do IV and continue the pre medicate protocol? \"    0728: Okay to do IV contrast due to not tolerating PO contrast.    Contacted CT to notify of intolerance and to notify per MD we can do IV contrast and continue the premedicate protocol.    Pt has received all dose of premedicate protocol except the hour prior doses.     CT scheduled transport for 1000 and stated to give the rest of the premedicate doses IV benadryl and PO Prednisone at 0900.     Meds rescheduled, day shift RN made aware of update.

## 2024-02-28 NOTE — PROGRESS NOTES
Ocucpational Therapy  02/28/24    Chart reviewed up to this date, spoke to RN this morning who reported pt is off unit for procedure. OT will continue to follow up as able/medically appropriate.     JULIO CÉSAR Bassett, OTR/L

## 2024-02-28 NOTE — ADT AUTH CERT
Patient Demographics    Name Patient ID SSN Gender Identity Birth Date   Galilea Wiggins [id number]  Female [birth year] (68 yrs)     Address Phone Email Employer   [address] [address] [phone] (H)  [phone] (W)  [phone] (M) [email or web address] RETIRED     County Race Occupation Emp Status    [employer] Black /  -- Retired      Reg Status PCP Date Last Verified Next Review Date    Verified Chapincito Oakes MD  [phone]      Admission Date Discharge Date Admitting Provider     24 -- Bobby Mooney MD       Marital Status Zoroastrianism      Single Muslim        Emergency Contact 1 Emergency Contact 2   Chaz Wiggins (C)  [address] [facility] [address]  [facility]  [phone] (H)  [phone] (M) Mak Jeff (C)  [facility]  [phone] (H)     Subscriber Details  Hospital Account [place][id number]  [facility] [place] Name/Sex/Relation Subscriber  Subscriber Address/Phone Subscriber Emp/Emp Phone   1. [facility] MEDICARE  [id number] GALILEA WIGGINS - Female  (Self) 1955 [address] [place]  [address] [phone](H)  [phone](O) RETIRED     Utilization Reviews         by Christine Ji RN   Last Updated by Christine Ji RN on 2024 1318     Review Status Created By   In Primary Christine Ji RN       Review Type   --      Criteria Review   DATE: 2024  IP TELE DAY 3        PERTINENT UPDATES:  Interval history / Subjective:   Patient seen examined at bedside earlier 1 episode of emesis still with positive nausea      Assessment & Plan:       Nausea and Committing with recent Peritonitis  Retroperitoneal Mass  -Appreciate GI's thorough eval has had chronic nausea vomiting CT abdomen no acute pathology has had unremarkable EGD 2023 question if Trulicity is possible culture given no gastroparesis  - Hydrate due to N/V  - Clear Liquid diet, will trial Marinol, on scopolamine             MEDICATIONS:  prochlorperazine (COMPAZINE) injection 5 mg  Dose: 5 mg  Freq: EVERY 6 HOURS PRN Route: IV  x1         morphine (PF) injection 1 mg  Dose: 1 mg  Freq: EVERY 4 HOURS PRN Route: IV  x2     potassium bicarb-citric acid (EFFER-K) effervescent tablet 40 mEq  Dose: 40 mEq  Freq: 2 TIMES DAILY Route: PO     ORDERS:  Adult clear liquid diet  Contact isolation  Up w/ assist        PT/OT/SLP/CM/RN ASSESSMENT OR NOTES:  PT Note:  ASSESSMENT :   DEFICITS/IMPAIRMENTS:   The patient is limited by decreased functional mobility, balance, gait s/p admission on 2/21 with intractable nausea and vomiting. Pt reported a recent fall prior to this admission with L sided flank pain.       Based on the impairments listed above pt required CGA bed mobility, transferred with RW with CGA and tolerated gait training x 15 ft x 2 with CGA. Pt with generalized instability during gait, but no overt LOB noted. Noted drop in BP after short bout of gait, but denied symptoms. Anticipate pt will be able to return home with HHPT and family assist as needed.        OT Note:  SUBJECTIVE:   Patient stated, “This L side is hurting. I fell at home, I was running trying to get up to go to the bathroom and the next thing I know I'm on the floor.”  ASSESSMENT :  The patient is limited by decreased functional mobility, independence in ADLs, high-level IADLs, ROM, strength, endurance, safety awareness, coordination, balance, distal lower body access, & orthostatic hypotension, as well as L trunk/flank pain s/p admission for sepsis 2* peritonitis. She is Mod I for mobility with RW, receives occasional assist for BADLs from her daughter whom she lives with. She presents slightly below her recent baseline, requiring CGA-Min A for ADLs and OOB mobility with RW. Decreased balance with extended bathroom mobility, noted to be orthostatic (see below), VSS with return to supine. Recommend d/c home with HHOT and family assist PRN.

## 2024-02-28 NOTE — PROGRESS NOTES
Physician Progress Note      PATIENT:               LULY WIGGINS  CSN #:                  [id number]  :                       1955  ADMIT DATE:       2024 10:38 PM  DISCH DATE:  RESPONDING  PROVIDER #:        Maya Guzman MD          QUERY TEXT:    Patient was admitted with nausea/vomiting, noted to have atrial fibrillation   and is maintained on and receiving Eliquis during admission. Please document   in progress notes and discharge summary if any of the following are being   evaluated and/or treated:    The medical record reflects the following:    Risk Factors: 68-year-old female with Afib on Eliquis and h/o HTN, DM, CVAs,   CAD s/p CABG/PCI  Clinical Indicators: Hospitalist : \"History of atrial fibrillation\"  Treatment: Eliquis    Thank-you,  Dominic Vogt RN, CCDS, CRCR  Clinical   [email or web address]  [phone]  You can also contact me via Perfect [employer].  Options provided:  -- Secondary hypercoagulable state in a patient with atrial fibrillation  -- Other - I will add my own diagnosis  -- Disagree - Not applicable / Not valid  -- Disagree - Clinically unable to determine / Unknown  -- Refer to Clinical Documentation Reviewer    PROVIDER RESPONSE TEXT:    This patient has secondary hypercoagulable state in a patient with atrial   fibrillation.    Query created by: Dominic Vogt on 2024 4:13 PM      Electronically signed by:  Maya Guzman MD 2024 4:31 PM

## 2024-02-29 ENCOUNTER — APPOINTMENT (OUTPATIENT)
Facility: HOSPITAL | Age: 69
End: 2024-02-29
Payer: MEDICARE

## 2024-02-29 LAB
ALBUMIN SERPL-MCNC: 1.7 G/DL (ref 3.5–5)
ALBUMIN/GLOB SERPL: 0.4 (ref 1.1–2.2)
ALP SERPL-CCNC: 138 U/L (ref 45–117)
ALT SERPL-CCNC: 14 U/L (ref 12–78)
ANION GAP SERPL CALC-SCNC: 12 MMOL/L (ref 5–15)
AST SERPL-CCNC: 10 U/L (ref 15–37)
BILIRUB DIRECT SERPL-MCNC: 0.2 MG/DL (ref 0–0.2)
BILIRUB SERPL-MCNC: 0.4 MG/DL (ref 0.2–1)
BUN SERPL-MCNC: 21 MG/DL (ref 6–20)
BUN/CREAT SERPL: 4 (ref 12–20)
CALCIUM SERPL-MCNC: 8 MG/DL (ref 8.5–10.1)
CHLORIDE SERPL-SCNC: 97 MMOL/L (ref 97–108)
CO2 SERPL-SCNC: 26 MMOL/L (ref 21–32)
CREAT SERPL-MCNC: 4.94 MG/DL (ref 0.55–1.02)
EKG ATRIAL RATE: 80 BPM
EKG DIAGNOSIS: NORMAL
EKG P AXIS: 75 DEGREES
EKG P-R INTERVAL: 150 MS
EKG Q-T INTERVAL: 344 MS
EKG QRS DURATION: 96 MS
EKG QTC CALCULATION (BAZETT): 396 MS
EKG R AXIS: -5 DEGREES
EKG T AXIS: 162 DEGREES
EKG VENTRICULAR RATE: 80 BPM
GLOBULIN SER CALC-MCNC: 4.6 G/DL (ref 2–4)
GLUCOSE BLD STRIP.AUTO-MCNC: 115 MG/DL (ref 65–117)
GLUCOSE BLD STRIP.AUTO-MCNC: 127 MG/DL (ref 65–117)
GLUCOSE BLD STRIP.AUTO-MCNC: 144 MG/DL (ref 65–117)
GLUCOSE BLD STRIP.AUTO-MCNC: 192 MG/DL (ref 65–117)
GLUCOSE SERPL-MCNC: 151 MG/DL (ref 65–100)
HBV SURFACE AB SER QL: NONREACTIVE
HBV SURFACE AB SER-ACNC: <3.1 MIU/ML
MAGNESIUM SERPL-MCNC: 1.8 MG/DL (ref 1.6–2.4)
PHOSPHATE SERPL-MCNC: 4.2 MG/DL (ref 2.6–4.7)
POTASSIUM SERPL-SCNC: 3.5 MMOL/L (ref 3.5–5.1)
PROT SERPL-MCNC: 6.3 G/DL (ref 6.4–8.2)
SERVICE CMNT-IMP: ABNORMAL
SERVICE CMNT-IMP: NORMAL
SODIUM SERPL-SCNC: 135 MMOL/L (ref 136–145)
VANCOMYCIN SERPL-MCNC: 14.7 UG/ML

## 2024-02-29 PROCEDURE — 83735 ASSAY OF MAGNESIUM: CPT

## 2024-02-29 PROCEDURE — 90945 DIALYSIS ONE EVALUATION: CPT

## 2024-02-29 PROCEDURE — 6360000002 HC RX W HCPCS: Performed by: NURSE PRACTITIONER

## 2024-02-29 PROCEDURE — 2580000003 HC RX 258: Performed by: NURSE PRACTITIONER

## 2024-02-29 PROCEDURE — 84100 ASSAY OF PHOSPHORUS: CPT

## 2024-02-29 PROCEDURE — 36415 COLL VENOUS BLD VENIPUNCTURE: CPT

## 2024-02-29 PROCEDURE — 6370000000 HC RX 637 (ALT 250 FOR IP): Performed by: NURSE PRACTITIONER

## 2024-02-29 PROCEDURE — 97530 THERAPEUTIC ACTIVITIES: CPT

## 2024-02-29 PROCEDURE — 97116 GAIT TRAINING THERAPY: CPT

## 2024-02-29 PROCEDURE — 80076 HEPATIC FUNCTION PANEL: CPT

## 2024-02-29 PROCEDURE — A4216 STERILE WATER/SALINE, 10 ML: HCPCS | Performed by: NURSE PRACTITIONER

## 2024-02-29 PROCEDURE — 82962 GLUCOSE BLOOD TEST: CPT

## 2024-02-29 PROCEDURE — C9113 INJ PANTOPRAZOLE SODIUM, VIA: HCPCS | Performed by: NURSE PRACTITIONER

## 2024-02-29 PROCEDURE — 2580000003 HC RX 258: Performed by: INTERNAL MEDICINE

## 2024-02-29 PROCEDURE — 6370000000 HC RX 637 (ALT 250 FOR IP): Performed by: STUDENT IN AN ORGANIZED HEALTH CARE EDUCATION/TRAINING PROGRAM

## 2024-02-29 PROCEDURE — 80048 BASIC METABOLIC PNL TOTAL CA: CPT

## 2024-02-29 PROCEDURE — 71045 X-RAY EXAM CHEST 1 VIEW: CPT

## 2024-02-29 PROCEDURE — 94640 AIRWAY INHALATION TREATMENT: CPT

## 2024-02-29 PROCEDURE — 74018 RADEX ABDOMEN 1 VIEW: CPT

## 2024-02-29 PROCEDURE — 86706 HEP B SURFACE ANTIBODY: CPT

## 2024-02-29 PROCEDURE — 80202 ASSAY OF VANCOMYCIN: CPT

## 2024-02-29 PROCEDURE — 2060000000 HC ICU INTERMEDIATE R&B

## 2024-02-29 PROCEDURE — 6370000000 HC RX 637 (ALT 250 FOR IP)

## 2024-02-29 RX ORDER — LANOLIN ALCOHOL/MO/W.PET/CERES
100 CREAM (GRAM) TOPICAL DAILY
Status: DISCONTINUED | OUTPATIENT
Start: 2024-02-29 | End: 2024-03-16 | Stop reason: HOSPADM

## 2024-02-29 RX ADMIN — MONTELUKAST 10 MG: 10 TABLET, FILM COATED ORAL at 20:47

## 2024-02-29 RX ADMIN — DULOXETINE HYDROCHLORIDE 20 MG: 20 CAPSULE, DELAYED RELEASE ORAL at 08:43

## 2024-02-29 RX ADMIN — TROLAMINE SALICYLATE: 100 CREAM TOPICAL at 20:47

## 2024-02-29 RX ADMIN — SODIUM CHLORIDE, SODIUM LACTATE, CALCIUM CHLORIDE, MAGNESIUM CHLORIDE AND DEXTROSE 6000 ML: 1.5; 538; 448; 18.3; 5.08 INJECTION, SOLUTION INTRAPERITONEAL at 18:30

## 2024-02-29 RX ADMIN — ARFORMOTEROL TARTRATE: 15 SOLUTION RESPIRATORY (INHALATION) at 20:40

## 2024-02-29 RX ADMIN — Medication 10 ML: at 08:43

## 2024-02-29 RX ADMIN — ARFORMOTEROL TARTRATE: 15 SOLUTION RESPIRATORY (INHALATION) at 07:27

## 2024-02-29 RX ADMIN — DULOXETINE HYDROCHLORIDE 20 MG: 20 CAPSULE, DELAYED RELEASE ORAL at 20:47

## 2024-02-29 RX ADMIN — Medication 10 ML: at 20:48

## 2024-02-29 RX ADMIN — APIXABAN 2.5 MG: 2.5 TABLET, FILM COATED ORAL at 20:47

## 2024-02-29 RX ADMIN — APIXABAN 2.5 MG: 2.5 TABLET, FILM COATED ORAL at 08:43

## 2024-02-29 RX ADMIN — ROSUVASTATIN CALCIUM 10 MG: 10 TABLET, FILM COATED ORAL at 20:47

## 2024-02-29 RX ADMIN — SODIUM CHLORIDE, SODIUM LACTATE, CALCIUM CHLORIDE, MAGNESIUM CHLORIDE AND DEXTROSE 6000 ML: 1.5; 538; 448; 18.3; 5.08 INJECTION, SOLUTION INTRAPERITONEAL at 18:29

## 2024-02-29 RX ADMIN — Medication 100 MG: at 12:57

## 2024-02-29 RX ADMIN — ACETAMINOPHEN 650 MG: 325 TABLET ORAL at 20:47

## 2024-02-29 RX ADMIN — CARVEDILOL 6.25 MG: 6.25 TABLET, FILM COATED ORAL at 08:43

## 2024-02-29 RX ADMIN — Medication 3 MG: at 20:47

## 2024-02-29 RX ADMIN — SODIUM CHLORIDE: 9 INJECTION, SOLUTION INTRAVENOUS at 23:55

## 2024-02-29 RX ADMIN — CARVEDILOL 6.25 MG: 6.25 TABLET, FILM COATED ORAL at 18:19

## 2024-02-29 RX ADMIN — PANTOPRAZOLE SODIUM 40 MG: 40 INJECTION, POWDER, FOR SOLUTION INTRAVENOUS at 08:43

## 2024-02-29 RX ADMIN — SODIUM CHLORIDE: 9 INJECTION, SOLUTION INTRAVENOUS at 08:58

## 2024-02-29 RX ADMIN — GENTAMICIN SULFATE: 1 CREAM TOPICAL at 18:30

## 2024-02-29 RX ADMIN — ASPIRIN 81 MG CHEWABLE TABLET 81 MG: 81 TABLET CHEWABLE at 08:43

## 2024-02-29 ASSESSMENT — PAIN SCALES - GENERAL
PAINLEVEL_OUTOF10: 0

## 2024-02-29 ASSESSMENT — PAIN DESCRIPTION - ORIENTATION: ORIENTATION: LEFT;OTHER (COMMENT)

## 2024-02-29 ASSESSMENT — PAIN DESCRIPTION - LOCATION: LOCATION: SHOULDER;NECK

## 2024-02-29 NOTE — PROGRESS NOTES
Hospitalist Progress Note  Jean-Claude Castaneda MD  Answering service: [phone] OR [phone] from in house phone        Date of Service:  2024  NAME:  Galilea Aguilar  :  1955  MRN:  [id number]      Admission Summary:     HPI: \"Galilea Aguilar is a 68 y.o. female with a history of CAD, COPD, CVA, diabetes, ESRD on peritoneal dialysis who presented to the ED with chief complaint of vomiting and left side/flank pain. In the ED, labs consistent with known renal dysfunction. QTc was found to be prolonged (554). CT demonstrated a large right retroperitoneal mass though has been present on several CT scans since . Patient treated with IV Valium for nausea and referred to hospitalists for further management.      Patient was seen and examined this morning in the ED, she was lying in bed in no acute distress -though did notice her having \"dry heaves\". Patient was recently admitted from  - 2024 for generalized abdominal pain, diagnosed with severe sepsis secondary to peritonitis and was discharged on vancomycin to be given through her peritoneal dialysis fluid and has also been prescribed Diflucan daily for the next month while on antibiotics.  She reports that since she has been discharged, she has had nausea and vomiting that has been persistent and has had difficulty keeping anything down.  Denies any headaches or dizziness, no chest pain or pressure, no shortness of breath or coughing.  Reports no diarrhea or constipation at home and has no abdominal pain.       Medication reconciliation completed with patient at bedside.      Addendum 1540:   - Tylenol not sufficient  for left sided flank discomfort  - give low dose morphine as she is having difficulty tolerating po. Was given 4 mg last night.  - add compazine as alternate nausea mediction\"       Interval history / Subjective:     Patient seen examined at

## 2024-02-29 NOTE — FLOWSHEET NOTE
PD DISCONNECTION   02/29/24 0630   Vitals   /69   Pulse 72   Respirations 16   Observations & Evaluations   Level of Consciousness 0   Respiratory Quality/Effort Unlabored   O2 Device None (Room air)   Skin Condition/Temp Warm;Dry   Abdomen Inspection Soft;Rounded   Edema None   Peritoneal Dialysis Catheter Mid lower abdomen   No placement date or time found.   Catheter Location: Mid lower abdomen   Status Deaccessed   Site Condition Clean, dry, intact   Dressing Status Clean, dry & intact   Dressing Gauze   Date of Last Dressing Change 02/28/24   Dialysis Type Continuous cycling   Catheter Care Given Yes  (one minute alcavis scrub followed by one minute soak and sterile mini cap)   Post-Treatment (Cycler)   Average Dwell Time (Hours:Minutes) 1:19   Lost Dwell Time (Hours:Minutes) 0:17   Effluent Appearance Clear;Yellow   Volume Gain (mL) 168 mL  (ID 14 + UF positive 182 = VOLUME GAIN 168)     SBAR:  DEANDRA Mak RN   TIME OUT:  0625  Comments: on arrival patient in drain 5/5 with low uf alarm repositioned and continued to train, treatment completed with a volume gain of 168 nephrology notified, no additional alarms for the treatment. Bed in low position, side rails up, call bell within reach on my departure.  SBAR with primary nurse.

## 2024-02-29 NOTE — PROGRESS NOTES
[facility]      Nephrology  Note  Galilea Aguilar  Date of Admission : 2/21/2024    CC:  Follow up for ESRD       Assessment and Plan     ESRD on PD   - Continue with current CCPD orders.    - N+V unlikely 2/2 uremia   - continue IVF until she starts TF     Recent PD peritonitis  - cx positive for staph epi  -Completed IV antibiotics  -No further IP antibiotics planned    Hypokalemia/Mg  Severe hypoalbuminemia  -2/2 poor nutrition  -for J tube feeds today     Encephalopathy:  -?  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 Renal cysts  - for post pylori feeding tube     Anemia of CKD  -Continue LIS    MBD of CKD  - stable     Prolonged QT interval   HTN   DM  COPD  CAD         Interval History:  Seen and examined. DHT in place but has not been used yet. No low flow issues w/ PD last night. She is agreeable to temp HD when she gets J tube if that's going to help her nutrition     Current Medications: all current  Medications have been eviewed in EPIC  Review of Systems: A comprehensive review of systems was negative.    Objective:  Vitals:    Vitals:    02/29/24 0552 02/29/24 0630 02/29/24 0829 02/29/24 1000   BP:  128/69 120/69    Pulse: 77 72 71 80   Resp:  16 20    Temp:   97.7 °F (36.5 °C)    TempSrc:   Oral    SpO2:   100%    Weight:       Height:         Intake and Output:  No intake/output data recorded.  02/27 1901 - 02/29 0700  In: 398 [P.O.:230]  Out: 73     Physical Examination:  General: Confusion resolving  Neck:  Supple, no mass  Resp:  Lungs CTA B/L, no wheezing , normal respiratory effort  CV:  Regular Rate/ Rhythm,  no murmur or rub,no  LE edema  GI:  Soft, NT, PD exit site dressing D/I.  Neurologic:  Confused oriented x 1      LABS:  Recent Labs     02/29/24  0751 02/28/24  0733 02/27/24  0645 02/25/24  0220 02/24/24  0538   NA

## 2024-02-29 NOTE — PLAN OF CARE
Problem: Physical Therapy - Adult  Goal: By Discharge: Performs mobility at highest level of function for planned discharge setting.  See evaluation for individualized goals.  Description: FUNCTIONAL STATUS PRIOR TO ADMISSION: Patient was modified independent using a rolling walker for functional mobility, wheelchair for longer distances. Pt reported a fall a few days prior to this admission    HOME SUPPORT PRIOR TO ADMISSION: The patient lived with family and required assist for some ADLs.    Physical Therapy Goals  Initiated 2/23/2024  1.  Patient will move from supine to sit and sit to supine and roll side to side in bed with modified independence within 7 day(s).    2.  Patient will perform sit to stand with modified independence within 7 day(s).  3.  Patient will transfer from bed to chair and chair to bed with modified independence using the least restrictive device within 7 day(s).  4.  Patient will ambulate with supervision/set-up for 50 feet with the least restrictive device within 7 day(s).   5.  Patient will ascend/descend 4 stairs with single handrail(s) with supervision/set-up within 7 day(s).    Outcome: Not Progressing   PHYSICAL THERAPY TREATMENT    Patient: Galilea Aguilar (68 y.o. female)  Date: 2/29/2024  Diagnosis: Hypomagnesemia [E83.42]  Vomiting [R11.10]  Prolonged Q-T interval on ECG [R94.31]  Intractable nausea and vomiting [R11.2] Vomiting      Precautions: Contact Precautions, Fall Risk                      ASSESSMENT:  Patient continues to benefit from skilled PT services and is slowly progressing towards goals. She demonstrates the need for Min A to right her trunk in order to sit EOB. Once seated patient requires verbal and tactile cues to improve anterior weight shift for sitting balance. She demonstrates poor initial sit to stands pushing posteriorly and requiring physical assistance for balance. She transfer with use of RW and increased posterior lean and wide MADISON. She demonstrates  improved balance and stability with increasing repetitions but continues to require at least VC for anterior weight shift. Patient is left in bedside chair with all needs met.          PLAN:  Patient continues to benefit from skilled intervention to address the above impairments.  Continue treatment per established plan of care.    Recommendation for discharge: (in order for the patient to meet his/her long term goals): Therapy up to 5 days/week in Skilled nursing facility    Other factors to consider for discharge: patient's current support system is unable to meet their requirements for physical assistance, poor safety awareness, impaired cognition, high risk for falls, not safe to be alone, and concern for safely navigating or managing the home environment    IF patient discharges home will need the following DME: continuing to assess with progress       SUBJECTIVE:   Patient stated, \".\"    OBJECTIVE DATA SUMMARY:   Critical Behavior:          Functional Mobility Training:  Bed Mobility:  Bed Mobility Training  Supine to Sit: Minimum assistance;Additional time;Adaptive equipment  Scooting: Maximum assistance  Transfers:  Transfer Training  Sit to Stand: Moderate assistance  Stand to Sit: Moderate assistance  Stand Pivot Transfers: Moderate assistance  Bed to Chair: Moderate assistance  Balance:  Balance  Sitting: Impaired  Sitting - Static: Fair (occasional)  Sitting - Dynamic: Fair (occasional)  Standing: Impaired  Standing - Static: Constant support;Fair;Poor  Standing - Dynamic: Constant support;Fair;Poor   Ambulation/Gait Training:     Gait  Overall Level of Assistance: Moderate assistance  Distance (ft): 5 Feet  Assistive Device: Gait belt;Walker, rolling  Base of Support: Widened;Center of gravity altered  Speed/Tanya: Slow;Shuffled  Step Length: Right shortened;Left shortened  Gait Abnormalities: Shuffling gait;Decreased step clearance  Neuro Re-Education:                       Pain Intervention(s):

## 2024-02-29 NOTE — PROGRESS NOTES
Comprehensive Nutrition Assessment    Type and Reason for Visit: Reassess    Nutrition Recommendations/Plan:     Initiate TF via post-pyloric DHT of Vital 1.5 @ 10-20 mL/hr. Advance slowly by 10 mL q 12-24 hours to monitor tolerance and electrolytes.  Goal is 50 mL/hr.  Flush with 30 mL h2o q 4 hours - or per nephrology    Please give daily thiamine  Consider scheduling anti-emetics.    Continue clears and advance as tolerated.  Pt hates all ONS.  Obtain new weight       Malnutrition Assessment:  Malnutrition Status:  Moderate malnutrition (02/23/24 1431)    Context:  Chronic Illness     Findings of the 6 clinical characteristics of malnutrition:  Energy Intake:  Unable to assess  Weight Loss:  Greater than 7.5% over 3 months     Body Fat Loss:  Mild body fat loss Triceps   Muscle Mass Loss:  Unable to assess    Fluid Accumulation:  No significant fluid accumulation     Strength:  Not Performed       Nutrition Assessment:    Past Medical History:   Diagnosis Date    Asthma     CAD (coronary artery disease)     COPD (chronic obstructive pulmonary disease) ([facility])     PCP manages    CVA (cerebral vascular accident) ([facility]) 11/01/2023    Patient presented with right-sided weakness.  Per MRI of the brain on 11/1/2023, acute versus subacute embolic infarctions involving the left MCA territory and right PICA territory    Diabetes mellitus, type 2 ([facility])     ESRD on peritoneal dialysis ([facility])     Eduardo (Tammie-PD nurse) [facility] [phone]- was on hemodialysis M-W-F Eduardo - now on PD    GERD (gastroesophageal reflux disease)     History of blood transfusion     Hyperlipidemia     Hypertension        Pt admitted with Hypomagnesemia [E83.42]  Vomiting [R11.10]  Prolonged Q-T interval on ECG [R94.31]  Intractable nausea and vomiting [R11.2]  GI following.    2/29: remains on clears, still with episodes of vomiting per GI.  DHT in duodenum per KUB last night.  Would consider starting TF today as pt remains without significant  nutrition.  Labs OK today.   No new weight.      2/27: consult received for poor PO intake and now TF consult.  Pt continues to struggle with on/ off N/V, did not tolerate advancement to full liquids over the weekend.   She was trialed on Marinol, but then developed acute metabolic encephalopathy and hallucinations, so d/c'd.  Per MD yesterday, may need to consider trialing benzos.  Not sure if there is a mental component/ already anticipates emesis, but may be worth trying.    Palliative consulted.  Seen at lunch today-  consume 100% of her broth, jello, and 50% of her Ensure Clear shake.  Pt states she doesn't like it.  I told her it is best way to get protein in her diet right now and I was proud she had consume half of it despite not liking it.  Pt picked it up and started drinking more, visibly struggling to get it down though - c/o the taste, and honestly based on the face she was making, I was afraid she was about to vomit.  Then she finished it and looked even more like she was going to get sick.  I told her it is important to think about food as medicine, but not the point that it makes you sick to your stomach.  Does not like the Gelatein.  Had about 5 Ensure Clear shakes stacked up in room.  I offered alternative flavor - she doesn't want either.  I am not sure what is causing her persistent ongoing N/V.    Per GI, pt had unremarkable EGD 10/23 for this reason.  Today, GI assessment \"Nausea and vomiting likely multifactorial given patient's history of altered GI motility, longstanding DM2, H/o adhesions, metabolic encephalopathy, marked PUD, or cholelithiasis. Albumin this morning 1.6, recommending nutrition to eval for TF and monitor for risk of refeeding syndrome.\"  I am not sure feeding directly into stomach is going to help with her N/V.    While pt is a diabetic and on dialysis, prefer to start with a more GI friendly formula.  Vital 1.5 to goal of 50 mL/hr provides 1100 mL, 1650 kcal, 74 gm pro, and

## 2024-02-29 NOTE — PLAN OF CARE
Problem: Discharge Planning  Goal: Discharge to home or other facility with appropriate resources  Outcome: Progressing  Flowsheets (Taken 2/29/2024 1302)  Discharge to home or other facility with appropriate resources: Identify barriers to discharge with patient and caregiver     Problem: Pain  Goal: Verbalizes/displays adequate comfort level or baseline comfort level  Outcome: Progressing     Problem: Safety - Adult  Goal: Free from fall injury  Outcome: Progressing     Problem: Pain  Goal: Verbalizes/displays adequate comfort level or baseline comfort level  Outcome: Progressing

## 2024-02-29 NOTE — PROGRESS NOTES
[facility]  [address]  [place] [address] [phone]                     GI PROGRESS NOTE    Patient Name: Galilea Aguilar      : 1955      MRN: [id number]  Admit Date: 2024  Today's Date: 2024  CC: N/V    Subjective:     Patient is pleasantly confused this morning.  DHT in right nare, TF not started over night.  Denies abdominal pain or nausea.      Objective:     Blood pressure 138/81, pulse 77, temperature 97.4 °F (36.3 °C), temperature source Oral, resp. rate 18, height 1.6 m (5' 3\"), weight 71.4 kg (157 lb 6.5 oz), SpO2 98 %.    Physical Exam:  General appearance: ill appearing AAF, NAD  Skin: Extremities and face reveal no rashes. No brooks erythema.   HEENT: Sclerae anicteric.   Cardiovascular: Regular rate and rhythm.  Respiratory: Comfortable breathing with no accessory muscle use.  GI: Abdomen nondistended, soft, and nontender. Normal active bowel sounds.    Rectal: Deferred   Musculoskeletal: No pitting edema of the lower legs   Neurological: Alert and confused.          Data Review:    Recent Results (from the past 24 hour(s))   POCT Glucose    Collection Time: 24  4:25 PM   Result Value Ref Range    POC Glucose 263 (H) 65 - 117 mg/dL    Performed by: YARELIS CHAKRABORTY    POCT Glucose    Collection Time: 24 10:39 PM   Result Value Ref Range    POC Glucose 203 (H) 65 - 117 mg/dL    Performed by: LEONARD Daly    POCT Glucose    Collection Time: 24  6:08 AM   Result Value Ref Range    POC Glucose 192 (H) 65 - 117 mg/dL    Performed by: BATSHEVA Lazcano    Vancomycin Level, Random    Collection Time: 24  7:51 AM   Result Value Ref Range    Vancomycin Rm 14.7 UG/ML   Basic Metabolic Panel    Collection Time: 24  7:51 AM   Result Value Ref Range    Sodium 135 (L) 136 - 145 mmol/L    Potassium 3.5 3.5 - 5.1 mmol/L    Chloride 97 97 - 108 mmol/L    CO2 26 21 - 32 mmol/L    Anion Gap 12 5 - 15 mmol/L    Glucose 151 (H) 65 -

## 2024-03-01 ENCOUNTER — APPOINTMENT (OUTPATIENT)
Facility: HOSPITAL | Age: 69
End: 2024-03-01
Payer: MEDICARE

## 2024-03-01 PROBLEM — R44.3 HALLUCINATIONS: Status: ACTIVE | Noted: 2024-03-01

## 2024-03-01 PROBLEM — G47.00 INSOMNIA: Status: ACTIVE | Noted: 2024-03-01

## 2024-03-01 LAB
ALBUMIN SERPL-MCNC: 1.7 G/DL (ref 3.5–5)
ALBUMIN/GLOB SERPL: 0.4 (ref 1.1–2.2)
ALP SERPL-CCNC: 132 U/L (ref 45–117)
ALT SERPL-CCNC: 14 U/L (ref 12–78)
ANION GAP SERPL CALC-SCNC: 6 MMOL/L (ref 5–15)
AST SERPL-CCNC: 12 U/L (ref 15–37)
BILIRUB DIRECT SERPL-MCNC: 0.2 MG/DL (ref 0–0.2)
BILIRUB SERPL-MCNC: 0.4 MG/DL (ref 0.2–1)
BUN SERPL-MCNC: 22 MG/DL (ref 6–20)
BUN/CREAT SERPL: 5 (ref 12–20)
CALCIUM SERPL-MCNC: 7.8 MG/DL (ref 8.5–10.1)
CHLORIDE SERPL-SCNC: 99 MMOL/L (ref 97–108)
CK SERPL-CCNC: 81 U/L (ref 26–192)
CO2 SERPL-SCNC: 28 MMOL/L (ref 21–32)
COMMENT:: NORMAL
CREAT SERPL-MCNC: 4.67 MG/DL (ref 0.55–1.02)
GLOBULIN SER CALC-MCNC: 4.6 G/DL (ref 2–4)
GLUCOSE BLD STRIP.AUTO-MCNC: 169 MG/DL (ref 65–117)
GLUCOSE BLD STRIP.AUTO-MCNC: 79 MG/DL (ref 65–117)
GLUCOSE BLD STRIP.AUTO-MCNC: 91 MG/DL (ref 65–117)
GLUCOSE BLD STRIP.AUTO-MCNC: 97 MG/DL (ref 65–117)
GLUCOSE SERPL-MCNC: 96 MG/DL (ref 65–100)
MAGNESIUM SERPL-MCNC: 1.8 MG/DL (ref 1.6–2.4)
PHOSPHATE SERPL-MCNC: 3.7 MG/DL (ref 2.6–4.7)
POTASSIUM SERPL-SCNC: 2.9 MMOL/L (ref 3.5–5.1)
PROT SERPL-MCNC: 6.3 G/DL (ref 6.4–8.2)
SERVICE CMNT-IMP: ABNORMAL
SERVICE CMNT-IMP: NORMAL
SODIUM SERPL-SCNC: 133 MMOL/L (ref 136–145)
SPECIMEN HOLD: NORMAL
VANCOMYCIN SERPL-MCNC: 11.1 UG/ML

## 2024-03-01 PROCEDURE — 90945 DIALYSIS ONE EVALUATION: CPT

## 2024-03-01 PROCEDURE — 80202 ASSAY OF VANCOMYCIN: CPT

## 2024-03-01 PROCEDURE — 2060000000 HC ICU INTERMEDIATE R&B

## 2024-03-01 PROCEDURE — 6370000000 HC RX 637 (ALT 250 FOR IP): Performed by: STUDENT IN AN ORGANIZED HEALTH CARE EDUCATION/TRAINING PROGRAM

## 2024-03-01 PROCEDURE — 80076 HEPATIC FUNCTION PANEL: CPT

## 2024-03-01 PROCEDURE — 84100 ASSAY OF PHOSPHORUS: CPT

## 2024-03-01 PROCEDURE — 6360000002 HC RX W HCPCS: Performed by: NURSE PRACTITIONER

## 2024-03-01 PROCEDURE — 2580000003 HC RX 258: Performed by: NURSE PRACTITIONER

## 2024-03-01 PROCEDURE — 6360000002 HC RX W HCPCS: Performed by: INTERNAL MEDICINE

## 2024-03-01 PROCEDURE — 6370000000 HC RX 637 (ALT 250 FOR IP): Performed by: INTERNAL MEDICINE

## 2024-03-01 PROCEDURE — 6370000000 HC RX 637 (ALT 250 FOR IP): Performed by: NURSE PRACTITIONER

## 2024-03-01 PROCEDURE — 97530 THERAPEUTIC ACTIVITIES: CPT

## 2024-03-01 PROCEDURE — 82962 GLUCOSE BLOOD TEST: CPT

## 2024-03-01 PROCEDURE — 94640 AIRWAY INHALATION TREATMENT: CPT

## 2024-03-01 PROCEDURE — 36415 COLL VENOUS BLD VENIPUNCTURE: CPT

## 2024-03-01 PROCEDURE — 99232 SBSQ HOSP IP/OBS MODERATE 35: CPT | Performed by: NURSE PRACTITIONER

## 2024-03-01 PROCEDURE — A4216 STERILE WATER/SALINE, 10 ML: HCPCS | Performed by: NURSE PRACTITIONER

## 2024-03-01 PROCEDURE — 83036 HEMOGLOBIN GLYCOSYLATED A1C: CPT

## 2024-03-01 PROCEDURE — 2580000003 HC RX 258: Performed by: INTERNAL MEDICINE

## 2024-03-01 PROCEDURE — 82550 ASSAY OF CK (CPK): CPT

## 2024-03-01 PROCEDURE — 74018 RADEX ABDOMEN 1 VIEW: CPT

## 2024-03-01 PROCEDURE — 80048 BASIC METABOLIC PNL TOTAL CA: CPT

## 2024-03-01 PROCEDURE — C9113 INJ PANTOPRAZOLE SODIUM, VIA: HCPCS | Performed by: NURSE PRACTITIONER

## 2024-03-01 PROCEDURE — 83735 ASSAY OF MAGNESIUM: CPT

## 2024-03-01 RX ORDER — SODIUM CHLORIDE, SODIUM LACTATE, CALCIUM CHLORIDE, MAGNESIUM CHLORIDE AND DEXTROSE 2.5; 538; 448; 18.3; 5.08 G/100ML; MG/100ML; MG/100ML; MG/100ML; MG/100ML
6000 INJECTION, SOLUTION INTRAPERITONEAL DAILY
Status: DISCONTINUED | OUTPATIENT
Start: 2024-03-01 | End: 2024-03-02

## 2024-03-01 RX ORDER — LANOLIN ALCOHOL/MO/W.PET/CERES
3 CREAM (GRAM) TOPICAL
Status: DISCONTINUED | OUTPATIENT
Start: 2024-03-01 | End: 2024-03-16 | Stop reason: HOSPADM

## 2024-03-01 RX ORDER — TRAZODONE HYDROCHLORIDE 50 MG/1
50 TABLET ORAL NIGHTLY
Status: DISCONTINUED | OUTPATIENT
Start: 2024-03-01 | End: 2024-03-01

## 2024-03-01 RX ORDER — TRAZODONE HYDROCHLORIDE 50 MG/1
50 TABLET ORAL NIGHTLY PRN
Status: DISCONTINUED | OUTPATIENT
Start: 2024-03-01 | End: 2024-03-01

## 2024-03-01 RX ORDER — SODIUM CHLORIDE, SODIUM LACTATE, CALCIUM CHLORIDE, MAGNESIUM CHLORIDE AND DEXTROSE 2.5; 538; 448; 18.3; 5.08 G/100ML; MG/100ML; MG/100ML; MG/100ML; MG/100ML
6000 INJECTION, SOLUTION INTRAPERITONEAL DAILY
Status: DISCONTINUED | OUTPATIENT
Start: 2024-03-01 | End: 2024-03-01

## 2024-03-01 RX ADMIN — ROSUVASTATIN CALCIUM 10 MG: 10 TABLET, FILM COATED ORAL at 21:09

## 2024-03-01 RX ADMIN — GENTAMICIN SULFATE: 1 CREAM TOPICAL at 16:51

## 2024-03-01 RX ADMIN — ARFORMOTEROL TARTRATE: 15 SOLUTION RESPIRATORY (INHALATION) at 19:38

## 2024-03-01 RX ADMIN — MONTELUKAST 10 MG: 10 TABLET, FILM COATED ORAL at 21:09

## 2024-03-01 RX ADMIN — ASPIRIN 81 MG CHEWABLE TABLET 81 MG: 81 TABLET CHEWABLE at 09:18

## 2024-03-01 RX ADMIN — SODIUM CHLORIDE, SODIUM LACTATE, CALCIUM CHLORIDE, MAGNESIUM CHLORIDE AND DEXTROSE 6000 ML: 2.5; 538; 448; 18.3; 5.08 INJECTION, SOLUTION INTRAPERITONEAL at 16:51

## 2024-03-01 RX ADMIN — DULOXETINE HYDROCHLORIDE 20 MG: 20 CAPSULE, DELAYED RELEASE ORAL at 09:18

## 2024-03-01 RX ADMIN — EPOETIN ALFA-EPBX 20000 UNITS: 10000 INJECTION, SOLUTION INTRAVENOUS; SUBCUTANEOUS at 19:32

## 2024-03-01 RX ADMIN — ARFORMOTEROL TARTRATE: 15 SOLUTION RESPIRATORY (INHALATION) at 08:16

## 2024-03-01 RX ADMIN — APIXABAN 2.5 MG: 2.5 TABLET, FILM COATED ORAL at 09:18

## 2024-03-01 RX ADMIN — Medication 3 MG: at 21:09

## 2024-03-01 RX ADMIN — POTASSIUM BICARBONATE 40 MEQ: 782 TABLET, EFFERVESCENT ORAL at 09:18

## 2024-03-01 RX ADMIN — CARVEDILOL 6.25 MG: 6.25 TABLET, FILM COATED ORAL at 09:19

## 2024-03-01 RX ADMIN — APIXABAN 2.5 MG: 2.5 TABLET, FILM COATED ORAL at 21:09

## 2024-03-01 RX ADMIN — SODIUM CHLORIDE, SODIUM LACTATE, CALCIUM CHLORIDE, MAGNESIUM CHLORIDE AND DEXTROSE 6000 ML: 1.5; 538; 448; 18.3; 5.08 INJECTION, SOLUTION INTRAPERITONEAL at 16:52

## 2024-03-01 RX ADMIN — DULOXETINE HYDROCHLORIDE 20 MG: 20 CAPSULE, DELAYED RELEASE ORAL at 21:09

## 2024-03-01 RX ADMIN — Medication 100 MG: at 09:18

## 2024-03-01 RX ADMIN — PANTOPRAZOLE SODIUM 40 MG: 40 INJECTION, POWDER, FOR SOLUTION INTRAVENOUS at 09:19

## 2024-03-01 RX ADMIN — Medication 10 ML: at 21:09

## 2024-03-01 RX ADMIN — Medication 10 ML: at 09:19

## 2024-03-01 ASSESSMENT — PAIN SCALES - GENERAL: PAINLEVEL_OUTOF10: 0

## 2024-03-01 NOTE — PROGRESS NOTES
Physician Progress Note      PATIENT:               LULY WIGGINS  CSN #:                  [id number]  :                       1955  ADMIT DATE:       2024 10:38 PM  DISCH DATE:  RESPONDING  PROVIDER #:        Jean-Claude Castaneda MD          QUERY TEXT:    Patient was admitted with intractable nausea and vomiting. The Registered   Dietician documented moderate malnutrition noting weight loss and mild body   fat loss. The pt had a Dobhoff placed and is receiving tube feedings. If   possible, please document in progress notes and discharge summary if you are   evaluating and /or treating any of the following:    The medical record reflects the following:    Risk Factors: 68-year-old female with PMH of DM, gastroparesis, ESRD on PD and   recent admission of sepsis due to peritonitis presented with intractable   nausea and vomiting  Clinical Indicators:  -- Registered Dietician Malnutrition Assessment:  \"Malnutrition Status:  Moderate malnutrition (24 1431)  Context:  Chronic Illness  Findings of the 6 clinical characteristics of malnutrition:  Energy Intake:  Unable to assess  Weight Loss:  Greater than 7.5% over 3 months  Body Fat Loss:  Mild body fat loss Triceps  Muscle Mass Loss:  Unable to assess  Fluid Accumulation:  No significant fluid accumulation   Strength:  Not Performed\"  Treatment: Registered Dietician consult, Dobhoff tube placement, tube feeding,   offered nutritional supplements, I/O    Thank-you,  Dominic Vogt RN, CCDS, CRCR  Clinical   [email or web address]  [phone]  You can also contact me via Perfect [employer].    ASPEN Criteria:    https://aspenjournals.onlinelibrary.chowdhury.com/doi/full/10.1177/036091085821814  5  Options provided:  -- Moderate protein calorie malnutrition  -- Other - I will add my own diagnosis  -- Disagree - Not applicable / Not valid  -- Disagree - Clinically unable to determine / Unknown  -- Refer to Clinical Documentation

## 2024-03-01 NOTE — CONSULTS
she opened eyes briefly, made eye contact, her facial expression suggested disappointment, then closed her eyes again. She made no attempt to answer my questions, would not follow commands.   Spoke with daughter re eliminating/avoiding any non essential medications that may contribute to delirium. I discontinued new order for trazadone at night and placed order for melatonin 3mg at bedtime, which she had been on at home.   We also discussed  Delirium precautions team can use to help better support individuals with delirium.  NURSES Please implement delirium precautions on this patient     Keep lights on and blinds open during the day, and light off and blinds closed during the night  Minimize noise in the room and in the hallway  Cluster care to avoid repeated interruptions overnight- anything that can be done during the day rather than at night time should be scheduled such  Limited causes of over stimulation- keep room clean and free of clutter, minimize the number of blankets on the bed so that they are not getting all tangled up, clear safe passage to the bathroom, remove lines/drains/catheters as soon as possible  Do not hold rounds in the room or even outside the door unless you are talking to the patient directly  Provide as much consistency with staff as possible so they know their caregivers  Utilize appropriate assistive devices such as glasses/hearing aides  Get the patient out of bed as much as possible during the day- up to a chair with activities to do, ambulating with staff, etc  Schedule trips to the bathroom to promote continence- avoid catheters/bedpans/purewick  Ask family to bring in familiar objects from home    Communication techniques      1.  Approach patient in full view and give verbal warning before touching patients   2.  Guide patient using one step directions   3.  If patient is argumentative, remain calm and try re-direction but do not confront    -Do a safety check then LEAVE THE  ROOM    -Return in 10 min to try again    -Upon return, start over, do not remind the patient about the previous interaction     Palliative not here over the weekend, but will follow up on Monday. Initial consult note routed to primary continuity provider and/or primary health care team members    Please call with any palliative questions or concerns.  Palliative Care Team is available via perfect serve or via phone.    Referrals to:   [] Outpatient Palliative Care  [] Home Based Palliative Care  [] Home Based Primary Care  [] Hospice       ADVANCE CARE PLANNING:   [x] [facility] Interdisciplinary Team has updated the ACP Navigator with Health Care Decision Maker and Patient Capacity      Primary Decision Maker (Active): Chaz Aguilar - Ken - [phone]    Primary Decision Maker: Mak Aguilar - [phone]  Confirm Advance Directive: None  Patient Would Like to Complete Advance Directive: No/refused    Current Code Status: Full Code     Goals of Care: Pending discussion        Please refer to Palliative Medicine ACP notes for further details.    PALLIATIVE ASSESSMENT:      Palliative Performance Scale (PPS):       ECOG:        Modified ESAS:       Clinical Pain Assessment (nonverbal scale for severity on nonverbal patients):           NVPS:       RDOS:         Vital Signs: Blood pressure 114/64, pulse 78, temperature 97.8 °F (36.6 °C), temperature source Oral, resp. rate 16, height 1.6 m (5' 3\"), weight 71.4 kg (157 lb 6.5 oz), SpO2 96 %.    PHYSICAL ASSESSMENT:   General: [] Oriented x3  [] Well appearing  [] Intubated  []Ill appearing  [x]Other: Appears older than stated age, obese  Mental Status: [] Normal mental status exam  [] Drowsy  [x] Confused  [x]Other: Speech mostly garbled, able to answer simple yes/no questions  Cardiovascular: [] Regular rate/rhythm  [] Arrhythmia  [] Other:  Chest: [x] Effort normal  []Lungs clear  [] Respiratory distress  []Tachypnea  [] Other:  Abdomen: [x]

## 2024-03-01 NOTE — PROGRESS NOTES
16:45- Pt pulled more than half of her dobhoff. Feeding stopped. 200 mL remaining in the bag. MD aware. Stat Chest X-ray done.

## 2024-03-01 NOTE — PROGRESS NOTES
[facility]  [address]  [place] [address] [phone]                     GI PROGRESS NOTE    Patient Name: Galilea Aguilar      : 1955      MRN: [id number]  Admit Date: 2024  Today's Date: 3/1/2024  CC: N/V    Subjective:     Patient remains confused today with chills, although afebrile. Denies abdominal pain or nausea.  Per nursing had soft/loose stool this morning.     Objective:     Blood pressure (!) 112/57, pulse 75, temperature 97.5 °F (36.4 °C), temperature source Oral, resp. rate 16, height 1.6 m (5' 3\"), weight 71.4 kg (157 lb 6.5 oz), SpO2 99 %.    Physical Exam:  General appearance: ill appearing AAF, NAD  Skin: Extremities and face reveal no rashes. No brooks erythema.   HEENT: Sclerae anicteric. DHT in R. Nare.   Cardiovascular: Regular rate and rhythm.  Respiratory: Comfortable breathing with no accessory muscle use.  GI: Abdomen nondistended, soft, and nontender. Normal active bowel sounds.    Rectal: Deferred   Musculoskeletal: No pitting edema of the lower legs   Neurological: Alert and confused.          Data Review:    Recent Results (from the past 24 hour(s))   POCT Glucose    Collection Time: 24  5:41 PM   Result Value Ref Range    POC Glucose 127 (H) 65 - 117 mg/dL    Performed by: BRANDI Nugent    POCT Glucose    Collection Time: 24 11:02 PM   Result Value Ref Range    POC Glucose 115 65 - 117 mg/dL    Performed by: LEONARD Daly    Vancomycin Level, Random    Collection Time: 24  5:28 AM   Result Value Ref Range    Vancomycin Rm 11.1 UG/ML   Basic Metabolic Panel    Collection Time: 24  5:28 AM   Result Value Ref Range    Sodium 133 (L) 136 - 145 mmol/L    Potassium 2.9 (L) 3.5 - 5.1 mmol/L    Chloride 99 97 - 108 mmol/L    CO2 28 21 - 32 mmol/L    Anion Gap 6 5 - 15 mmol/L    Glucose 96 65 - 100 mg/dL    BUN 22 (H) 6 - 20 MG/DL    Creatinine 4.67 (H) 0.55 - 1.02 MG/DL    Bun/Cre Ratio 5 (L) 12 - 20      Est, Glom Filt  include atypical lipomatosis tumor or lipoma.  2.  Small bilateral pleural effusions, increased.  3.  Hepatic steatosis. There is an area of increased density in the left hepatic lobe which may represent focal fatty sparing; however, other masses are not excluded. Consider liver MRI with and without intravenous contrast.      INTERVAL HISTORY: Patient remains pleasantly confused with chills, afebrile. DHT was pulled out last night, after attempts to advance DHT KUB showed DHT terminating in distal stomach. Tube feeds restarted at 10 mL. Discussed with nurse advancement to DHT to small bowel for post-pyloric feeds and the use of bridle for secure placement of tube.    She has had multiple hospital admissions with complex co-morbidities contributing to her nausea and vomiting; altered GI motility, longstanding DM2, metabolic encephalopathy, marked PUD, cholelithiasis,  ESRD on PD, CAD, COPD,  fatty retroperitoneal neoplasm and hepatic steatosis.        - Advance DHT to small bowel (80-90 cm) for post-pyloric feedings  - Obtain KUB to assess tube placement  - Restart tube feeds per nutritions recs once DHT is post-pyloric, monitor for refeeding  - Avoid CNS altering medications in PD patients  - Continue scolpolamine patch.   - PPI daily  - Trend Qtc- daily EKG  - Blood cultures NGTD  - Supportive care per hospitalist  - Renal following   - Palliative following  - GI following        Patient Active Hospital Problem List:   Principal Problem:    Vomiting  Active Problems:    Intractable nausea and vomiting    Type 2 diabetes with nephropathy (HCC)    Palliative care encounter    Altered mental status    Advanced care planning/counseling discussion  Resolved Problems:    * No resolved hospital problems. *      Time Spent with Patient: 20 mins  DAYA Watkins - NP

## 2024-03-01 NOTE — PROGRESS NOTES
[facility]      Nephrology  Note  Galilea Aguilar  Date of Admission : 2/21/2024    CC:  Follow up for ESRD       Assessment and Plan     ESRD on PD   - Continue with current CCPD orders.    -Changed bag 1 to 2.5% dextrose  -Daily labs    Recent PD peritonitis  - cx positive for staph epi  -Completed IV antibiotics  -No further IP antibiotics planned    Hypokalemia/Mg  Severe hypoalbuminemia  -2/2 poor nutrition  -On TF now  -K being replaced    Encephalopathy:  -?  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 Renal cysts  -S/p post pylori feeding tube     Anemia of CKD  -Continue LIS    MBD of CKD  - stable     Prolonged QT interval   HTN   DM  COPD  CAD         Interval History:  Patient seen and examined earlier this morning.  She was somewhat delirious.  PD complicated by low drain/negative UF alarms overnight.  Denies any diarrhea or abdominal pain.    Current Medications: all current  Medications have been eviewed in EPIC  Review of Systems: A comprehensive review of systems was negative.    Objective:  Vitals:    Vitals:    03/01/24 0306 03/01/24 0400 03/01/24 0549 03/01/24 0804   BP: 128/70   (!) 118/100   Pulse: 72 77 76 81   Resp: 18   18   Temp: 98.2 °F (36.8 °C)   97.9 °F (36.6 °C)   TempSrc: Oral   Oral   SpO2: 100%   96%   Weight:       Height:         Intake and Output:  03/01 0701 - 03/01 1900  In: 1651.5 [I.V.:1651.5]  Out: -   02/28 1901 - 03/01 0700  In: 808   Out: -     Physical Examination:  General: Confusion resolving  Neck:  Supple, no mass  Resp:  Lungs CTA B/L, no wheezing , normal respiratory effort  CV:  Regular Rate/ Rhythm,  no murmur or rub,no  LE edema  GI:  Soft, NT, PD exit site dressing D/I.  Neurologic:  Confused oriented x 1      LABS:  Recent Labs     03/01/24  0528 02/29/24  0751 02/28/24  0733   * 135*  135*   K 2.9* 3.5 3.6   CL 99 97 97   CO2 28 26 30   BUN 22* 21* 20   CREATININE 4.67* 4.94* 4.90*   CALCIUM 7.8* 8.0* 7.8*   LABALBU 1.7* 1.7* 1.5*   PHOS 3.7 4.2 4.0   MG 1.8 1.8 1.8       Recent Labs     02/28/24  0733 02/27/24  0645 02/26/24  0418   WBC 3.4* 7.1 6.5   HGB 7.5* 7.9* 8.2*   HCT 23.5* 24.2* 25.9*   * 141* 140*       No results for input(s): \"WINSTON\", \"KU\", \"CLU\", \"CREAU\" in the last 720 hours.    Invalid input(s): \"PROU\"  No results found for: \"SDES\"  No components found for: \"CULT\"      Review of Medical Records: I have reviewed all pertinent labs, chart notes, microbiology data, radiology imaging this patient.  Medications list personally reviewed.  No change in PMH ,family and social history from Consult note.      Adria Mary MD  [facility] [address], [facility] [place] [address]  Phone: [phone]   [phone]

## 2024-03-01 NOTE — PROGRESS NOTES
Hospitalist Progress Note  Jean-Claude Castaneda MD  Answering service: [phone] OR [phone] from in house phone        Date of Service:  3/1/2024  NAME:  Galilea Aguilar  :  1955  MRN:  [id number]      Admission Summary:     HPI: \"Galilea Aguilar is a 68 y.o. female with a history of CAD, COPD, CVA, diabetes, ESRD on peritoneal dialysis who presented to the ED with chief complaint of vomiting and left side/flank pain. In the ED, labs consistent with known renal dysfunction. QTc was found to be prolonged (554). CT demonstrated a large right retroperitoneal mass though has been present on several CT scans since . Patient treated with IV Valium for nausea and referred to hospitalists for further management.      Patient was seen and examined this morning in the ED, she was lying in bed in no acute distress -though did notice her having \"dry heaves\". Patient was recently admitted from  - 2024 for generalized abdominal pain, diagnosed with severe sepsis secondary to peritonitis and was discharged on vancomycin to be given through her peritoneal dialysis fluid and has also been prescribed Diflucan daily for the next month while on antibiotics.  She reports that since she has been discharged, she has had nausea and vomiting that has been persistent and has had difficulty keeping anything down.  Denies any headaches or dizziness, no chest pain or pressure, no shortness of breath or coughing.  Reports no diarrhea or constipation at home and has no abdominal pain.       Medication reconciliation completed with patient at bedside.      Addendum 1540:   - Tylenol not sufficient  for left sided flank discomfort  - give low dose morphine as she is having difficulty tolerating po. Was given 4 mg last night.  - add compazine as alternate nausea mediction\"       Interval history / Subjective:     Patient seen examined at  06:22 AM     Lab Results   Component Value Date/Time    GLUCPO 159 01/09/2023 03:20 PM    [facility] 01/09/2023 03:05 PM           Medications Reviewed:     Current Facility-Administered Medications   Medication Dose Route Frequency    potassium bicarb-citric acid (EFFER-K) effervescent tablet 40 mEq  40 mEq Oral Daily    thiamine tablet 100 mg  100 mg Oral Daily    iohexol (OMNIPAQUE 240) Oral 50 mL  50 mL Oral ONCE PRN    rosuvastatin (CRESTOR) tablet 10 mg  10 mg Oral Nightly    trolamine salicylate (ASPERCREME) 10 % cream   Topical BID PRN    lidocaine (XYLOCAINE) 2 % jelly   Topical PRN    pantoprazole (PROTONIX) 40 mg in sodium chloride (PF) 0.9 % 10 mL injection  40 mg IntraVENous Daily    diazePAM (VALIUM) injection 2.5 mg  2.5 mg IntraVENous Once    melatonin tablet 3 mg  3 mg Oral Nightly PRN    apixaban (ELIQUIS) tablet 2.5 mg  2.5 mg Oral BID    aspirin chewable tablet 81 mg  81 mg Oral Daily    carvedilol (COREG) tablet 6.25 mg  6.25 mg Oral BID WC    DULoxetine (CYMBALTA) extended release capsule 20 mg  20 mg Oral BID    ipratropium 0.5 mg-albuterol 2.5 mg (DUONEB) nebulizer solution 1 Dose  1 Dose Inhalation Q6H PRN    arformoterol 15 mcg-budesonide 0.25 mg neb solution   Nebulization BID RT    montelukast (SINGULAIR) tablet 10 mg  10 mg Oral Nightly    epoetin mohan-epbx (RETACRIT) injection 20,000 Units  20,000 Units SubCUTAneous Weekly    lidocaine 4 % external patch 1 patch  1 patch TransDERmal Daily    sodium chloride flush 0.9 % injection 5-40 mL  5-40 mL IntraVENous 2 times per day    sodium chloride flush 0.9 % injection 5-40 mL  5-40 mL IntraVENous PRN    0.9 % sodium chloride infusion   IntraVENous PRN    polyethylene glycol (GLYCOLAX) packet 17 g  17 g Oral Daily PRN    acetaminophen (TYLENOL) tablet 650 mg  650 mg Oral Q6H PRN    Or    acetaminophen (TYLENOL) suppository 650 mg  650 mg Rectal Q6H PRN    dianeal lo-brandi 1.5% 6,000 mL  6,000 mL IntraPERitoneal Daily    dianeal lo-brandi 1.5%

## 2024-03-01 NOTE — FLOWSHEET NOTE
PD CONNECTION   02/29/24 1830   Vitals   /61   Temp 98 °F (36.7 °C)   Temp Source Oral   Pulse 76   Observations & Evaluations   Level of Consciousness 0   Respiratory Quality/Effort Unlabored   O2 Device None (Room air)   Skin Condition/Temp Warm;Dry   Abdomen Inspection Soft;Rounded   Edema None   Peritoneal Dialysis Catheter Mid lower abdomen   No placement date or time found.   Catheter Location: Mid lower abdomen   Status Accessed   Site Condition Clean, dry, intact   Dressing Status New dressing applied   Dressing Gauze  (with gentamicin cfream)   Date of Last Dressing Change 02/29/24   Dialysis Type Continuous cycling   Exit Site Condition good   Catheter Care Given Yes  (one minute alcavis scrub followed by one minute soak prior to connection)   Cycler   Verification of Prescription CCPD   Informed Consent    (chronic consent conveys)   Total Volume Programmed 00431 mL   Therapy Time (Hours:Minutes) 9:30   Cycler Type Jung [facility]   Fill Volume 2500 mL   Last Fill Volume 0 mL   Dextrose Setting Same (Nonextraneal)   I Drain Alarm 0 mL   Number of Cycles 5   Bag Volume 6000 mL   Number of Bags Used 3   Dianeal Solution   (1.5%)     Time Out (time): 1825  Primary RN SBAR: PAMELA England RN  Patient Education: procedural   Hepatitis B Surface Ag   Date/Time Value Ref Range Status   02/26/2024 04:19 AM <0.10 Index Final     Hep B S Ab   Date/Time Value Ref Range Status   02/29/2024 07:51 AM <3.10 mIU/mL Final      Comments:  orders, labs, consent and code status reviewed.  PD treatment initiated as ordered.  Start time:  1850  End:  0420    On departure bed in low position, patient being monitored, side rails up x 3, call bell within reach.  SBAR with primary nurse.=

## 2024-03-01 NOTE — PROGRESS NOTES
Spiritual Care Assessment/Progress Note  [facility]    Name: Galilea Aguilar MRN: [id number]    Age: 68 y.o.     Sex: female   Language: English     Date: 3/1/2024            Total Time Calculated: 15 min              Spiritual Assessment begun in [facility] MED SURG  Service Provided For:: Patient not available  Referral/Consult From:: Palliative Care  Encounter Overview/Reason : Palliative Care    Spiritual beliefs:      [] Involved in a filemon tradition/spiritual practice:      [] Supported by a filemon community:      [] Claims no spiritual orientation:      [] Seeking spiritual identity:           [] Adheres to an individual form of spirituality:      [x] Not able to assess:                Identified resources for coping and support system:   Support System: Children, Palliative Care       [] Prayer                  [] Devotional reading               [] Music                  [] Guided Imagery     [] Pet visits                                        [] Other: (COMMENT)     Specific area/focus of visit   Encounter:    Crisis:    Spiritual/Emotional needs:    Ritual, Rites and Sacraments:    Grief, Loss, and Adjustments:    Ethics/Mediation:    Behavioral Health:    Palliative Care: Type: Palliative Care, Initial/Spiritual Assessment  Advance Care Planning:      I attempted to visit Galilea Aguilar for a palliative initial spiritual assessment. Unable to visit patient at this time. She was sleeping and did not awake. No family present. Patient's chart was consulted.  Chaplain Radha, Ismael, MS, BCC

## 2024-03-01 NOTE — PROGRESS NOTES
2253H Abdominal xray reviewed by NP-on-shift post NG tube reinsertion, placement confirmed.    2354H Vital feeding resumed at 10ml/hr with 30ml water flush every 4 hours per protocol.

## 2024-03-01 NOTE — FLOWSHEET NOTE
Peritoneal Dialysis Disconnection / [phone]    Primary RN SBAR: Sugar, RN  Patient Education: access/site care    Hepatitis B Surface Ag   Date/Time Value Ref Range Status   02/26/2024 04:19 AM <0.10 Index Final     Hep B S Ab   Date/Time Value Ref Range Status   02/29/2024 07:51 AM <3.10 mIU/mL Final       03/01/24 0431   Peritoneal Dialysis Catheter Mid lower abdomen   No placement date or time found.   Catheter Location: Mid lower abdomen   Status Deaccessed   Site Condition Clean, dry, intact   Dressing Status Clean, dry & intact   Dressing Gauze   Date of Last Dressing Change 02/29/24   Dialysis Type Continuous cycling   Exit Site Condition good   Catheter Care Given Yes   Post-Treatment (Cycler)   Average Dwell Time (Hours:Minutes) 1:14   Lost Dwell Time (Hours:Minutes) 0:42   Effluent Appearance Clear;Yellow   Volume Gain (mL) 570 mL  (ID 2 ml + TUF (-572 ml) = 570 ml volume gain)     Miguelangel RN at bedside to disconnect pt from CCPD tx. Orders, consent, pt, and code status confirmed. Tx completed. Used cassette and bags discarded. Education and pre/post report given to primary RN.  Upon arrival, 'low UF' alarms.  Miguelangel RN repositioned pt and resumed drain, despite multiple attempts at draining pt was fluid positive at end of ccpd tx - nephrology on call, Filiberto Lemus NP, updated on pt condition - no new orders received.    Net fluid gain: 570 ml

## 2024-03-01 NOTE — CARE COORDINATION
Transition of Care Plan:    RUR: 37% High  Prior Level of Functioning: Modified independent, uses a walker, cane and wheelchair, Family assists with bathing and dressing and [facility]  Disposition: SNF  If SNF or IPR: Date FOC offered: 3/1/24  Date FOC received: Choice pending  Accepting facility:   Date authorization started with reference number:   Date authorization received and expires:   Follow up appointments: NA  DME needed: Defer to SNF  Transportation at discharge: TBD  IM/IMM Medicare/ letter given: 2/23/24  Caregiver Contact: Son Mak Aguilar [phone]/Daughter Chaz Aguilar [phone]   Discharge Caregiver contacted prior to discharge? NA  Care Conference needed? No  Barriers to discharge: None    CM placed call to patient's daughter Chaz regarding the recommendation of SNF. CM emailed SNF list and copy of IM letter to Chaz at [email or web address]. Chaz states that she is also helping wit the DC planning of her aunt who is also hospitalized here at [facility]. Chaz expressed feeling that her mother was DC too soon before. Chaz opted to give CM a call back with her choices so that referrals can be placed. CM to monitor.    Malika Serrano, MS  [phone]

## 2024-03-01 NOTE — PLAN OF CARE
Problem: Safety - Adult  Goal: Free from fall injury  Recent Flowsheet Documentation  Taken 3/1/2024 1043 by Pat Shi, RN  Free From Fall Injury: Instruct family/caregiver on patient safety

## 2024-03-01 NOTE — FLOWSHEET NOTE
03/01/24 1653   Observations & Evaluations   Level of Consciousness 1   Oriented X AMS   Respiratory Quality/Effort Unlabored   O2 Device None (Room air)   Skin Condition/Temp Dry;Warm   Edema None   Peritoneal Dialysis Catheter Mid lower abdomen   No placement date or time found.   Catheter Location: Mid lower abdomen   Status Accessed   Site Condition Clean, dry, intact   Dressing Status New dressing applied   Dressing Gauze   Date of Last Dressing Change 03/01/24   Dialysis Type Continuous cycling   Exit Site Condition good   Catheter Care Given Yes   Cycler   Verification of Prescription CCPD   Informed Consent  Yes   Total Volume Programmed 24701 mL   Therapy Time (Hours:Minutes) 9:30   Cycler Type Jung [facility]Choice   Fill Volume 2500 mL   Last Fill Volume 0 mL   Dextrose Setting Same (Nonextraneal)   I Drain Alarm 0 mL   Number of Cycles 5   Bag Volume 6000 mL   Number of Bags Used 3   Dianeal Solution   (Dextrose 2.5% 1st bag, Dextrose 1.5% 2nd and 3rd bag)     Time Out (time): 1650  Primary RN SBAR: Pat Shi, RN    Patient Education: access care, procedure  Hepatitis B Surface Ag   Date/Time Value Ref Range Status   02/26/2024 04:19 AM <0.10 Index Final     Hep B S Ab   Date/Time Value Ref Range Status   02/29/2024 07:51 AM <3.10 mIU/mL Final

## 2024-03-01 NOTE — PROGRESS NOTES
Met with Patient at bedside. She is hallucinating this afternoon, talking to a daughter in the room who isn't present. She is hyper focused on leaving, and having difficulty following commands. Paused tube feeds and assisted Patient with rolling side to side to get line and pad straight. Nurse assisted with repositioning. HOB elevated 30 degrees and tube feeds re-started. Continue to recommend SNF rehab. Will follow up Monday as appropriate.    Suzanna Campbell PT, DPT  Geriatric Clinical Specialist    9 minutes

## 2024-03-02 LAB
ANION GAP SERPL CALC-SCNC: 6 MMOL/L (ref 5–15)
BACTERIA SPEC CULT: NORMAL
BUN SERPL-MCNC: 21 MG/DL (ref 6–20)
BUN/CREAT SERPL: 4 (ref 12–20)
CALCIUM SERPL-MCNC: 7.2 MG/DL (ref 8.5–10.1)
CHLORIDE SERPL-SCNC: 99 MMOL/L (ref 97–108)
CO2 SERPL-SCNC: 29 MMOL/L (ref 21–32)
CREAT SERPL-MCNC: 4.75 MG/DL (ref 0.55–1.02)
EKG ATRIAL RATE: 78 BPM
EKG DIAGNOSIS: NORMAL
EKG P AXIS: 65 DEGREES
EKG P-R INTERVAL: 138 MS
EKG Q-T INTERVAL: 350 MS
EKG QRS DURATION: 92 MS
EKG QTC CALCULATION (BAZETT): 399 MS
EKG R AXIS: 2 DEGREES
EKG T AXIS: 201 DEGREES
EKG VENTRICULAR RATE: 78 BPM
EST. AVERAGE GLUCOSE BLD GHB EST-MCNC: 111 MG/DL
GLUCOSE BLD STRIP.AUTO-MCNC: 180 MG/DL (ref 65–117)
GLUCOSE BLD STRIP.AUTO-MCNC: 221 MG/DL (ref 65–117)
GLUCOSE BLD STRIP.AUTO-MCNC: 221 MG/DL (ref 65–117)
GLUCOSE BLD STRIP.AUTO-MCNC: 239 MG/DL (ref 65–117)
GLUCOSE BLD STRIP.AUTO-MCNC: 253 MG/DL (ref 65–117)
GLUCOSE SERPL-MCNC: 198 MG/DL (ref 65–100)
HBA1C MFR BLD: 5.5 % (ref 4–5.6)
MAGNESIUM SERPL-MCNC: 1.9 MG/DL (ref 1.6–2.4)
PHOSPHATE SERPL-MCNC: 3.5 MG/DL (ref 2.6–4.7)
POTASSIUM SERPL-SCNC: 2.7 MMOL/L (ref 3.5–5.1)
SERVICE CMNT-IMP: ABNORMAL
SERVICE CMNT-IMP: NORMAL
SODIUM SERPL-SCNC: 134 MMOL/L (ref 136–145)

## 2024-03-02 PROCEDURE — 6370000000 HC RX 637 (ALT 250 FOR IP): Performed by: NURSE PRACTITIONER

## 2024-03-02 PROCEDURE — 83735 ASSAY OF MAGNESIUM: CPT

## 2024-03-02 PROCEDURE — 82962 GLUCOSE BLOOD TEST: CPT

## 2024-03-02 PROCEDURE — 6360000002 HC RX W HCPCS: Performed by: NURSE PRACTITIONER

## 2024-03-02 PROCEDURE — 6360000002 HC RX W HCPCS: Performed by: INTERNAL MEDICINE

## 2024-03-02 PROCEDURE — A4216 STERILE WATER/SALINE, 10 ML: HCPCS | Performed by: NURSE PRACTITIONER

## 2024-03-02 PROCEDURE — 2580000003 HC RX 258: Performed by: INTERNAL MEDICINE

## 2024-03-02 PROCEDURE — 2060000000 HC ICU INTERMEDIATE R&B

## 2024-03-02 PROCEDURE — 36415 COLL VENOUS BLD VENIPUNCTURE: CPT

## 2024-03-02 PROCEDURE — 84100 ASSAY OF PHOSPHORUS: CPT

## 2024-03-02 PROCEDURE — 6370000000 HC RX 637 (ALT 250 FOR IP): Performed by: INTERNAL MEDICINE

## 2024-03-02 PROCEDURE — 94760 N-INVAS EAR/PLS OXIMETRY 1: CPT

## 2024-03-02 PROCEDURE — 6370000000 HC RX 637 (ALT 250 FOR IP): Performed by: STUDENT IN AN ORGANIZED HEALTH CARE EDUCATION/TRAINING PROGRAM

## 2024-03-02 PROCEDURE — 2580000003 HC RX 258: Performed by: NURSE PRACTITIONER

## 2024-03-02 PROCEDURE — 90945 DIALYSIS ONE EVALUATION: CPT

## 2024-03-02 PROCEDURE — 80048 BASIC METABOLIC PNL TOTAL CA: CPT

## 2024-03-02 PROCEDURE — C9113 INJ PANTOPRAZOLE SODIUM, VIA: HCPCS | Performed by: NURSE PRACTITIONER

## 2024-03-02 PROCEDURE — 6370000000 HC RX 637 (ALT 250 FOR IP): Performed by: HOSPITALIST

## 2024-03-02 PROCEDURE — 93005 ELECTROCARDIOGRAM TRACING: CPT | Performed by: HOSPITALIST

## 2024-03-02 PROCEDURE — 94640 AIRWAY INHALATION TREATMENT: CPT

## 2024-03-02 RX ORDER — SODIUM CHLORIDE, SODIUM LACTATE, CALCIUM CHLORIDE, MAGNESIUM CHLORIDE AND DEXTROSE 1.5; 538; 448; 18.3; 5.08 G/100ML; MG/100ML; MG/100ML; MG/100ML; MG/100ML
6000 INJECTION, SOLUTION INTRAPERITONEAL DAILY
Status: DISCONTINUED | OUTPATIENT
Start: 2024-03-02 | End: 2024-03-04

## 2024-03-02 RX ORDER — SODIUM CHLORIDE AND POTASSIUM CHLORIDE 300; 900 MG/100ML; MG/100ML
INJECTION, SOLUTION INTRAVENOUS CONTINUOUS
Status: DISCONTINUED | OUTPATIENT
Start: 2024-03-02 | End: 2024-03-03

## 2024-03-02 RX ADMIN — ARFORMOTEROL TARTRATE: 15 SOLUTION RESPIRATORY (INHALATION) at 18:56

## 2024-03-02 RX ADMIN — SODIUM CHLORIDE, SODIUM LACTATE, CALCIUM CHLORIDE, MAGNESIUM CHLORIDE AND DEXTROSE 6000 ML: 1.5; 538; 448; 18.3; 5.08 INJECTION, SOLUTION INTRAPERITONEAL at 14:50

## 2024-03-02 RX ADMIN — DULOXETINE HYDROCHLORIDE 20 MG: 20 CAPSULE, DELAYED RELEASE ORAL at 22:28

## 2024-03-02 RX ADMIN — APIXABAN 2.5 MG: 2.5 TABLET, FILM COATED ORAL at 22:29

## 2024-03-02 RX ADMIN — ARFORMOTEROL TARTRATE: 15 SOLUTION RESPIRATORY (INHALATION) at 07:14

## 2024-03-02 RX ADMIN — CARVEDILOL 6.25 MG: 6.25 TABLET, FILM COATED ORAL at 09:32

## 2024-03-02 RX ADMIN — SODIUM CHLORIDE, SODIUM LACTATE, CALCIUM CHLORIDE, MAGNESIUM CHLORIDE AND DEXTROSE 6000 ML: 1.5; 538; 448; 18.3; 5.08 INJECTION, SOLUTION INTRAPERITONEAL at 14:51

## 2024-03-02 RX ADMIN — ACETAMINOPHEN 650 MG: 325 TABLET ORAL at 23:13

## 2024-03-02 RX ADMIN — Medication 10 ML: at 22:29

## 2024-03-02 RX ADMIN — INSULIN LISPRO 1 UNITS: 100 INJECTION, SOLUTION INTRAVENOUS; SUBCUTANEOUS at 17:45

## 2024-03-02 RX ADMIN — MONTELUKAST 10 MG: 10 TABLET, FILM COATED ORAL at 22:28

## 2024-03-02 RX ADMIN — GENTAMICIN SULFATE: 1 CREAM TOPICAL at 14:52

## 2024-03-02 RX ADMIN — PHENOL 1 SPRAY: 1.4 SPRAY ORAL at 17:47

## 2024-03-02 RX ADMIN — POTASSIUM CHLORIDE AND SODIUM CHLORIDE: 900; 300 INJECTION, SOLUTION INTRAVENOUS at 09:38

## 2024-03-02 RX ADMIN — ROSUVASTATIN CALCIUM 10 MG: 10 TABLET, FILM COATED ORAL at 22:29

## 2024-03-02 RX ADMIN — DULOXETINE HYDROCHLORIDE 20 MG: 20 CAPSULE, DELAYED RELEASE ORAL at 09:32

## 2024-03-02 RX ADMIN — Medication 3 MG: at 22:28

## 2024-03-02 RX ADMIN — INSULIN LISPRO 1 UNITS: 100 INJECTION, SOLUTION INTRAVENOUS; SUBCUTANEOUS at 07:23

## 2024-03-02 RX ADMIN — ASPIRIN 81 MG CHEWABLE TABLET 81 MG: 81 TABLET CHEWABLE at 09:32

## 2024-03-02 RX ADMIN — Medication 100 MG: at 09:32

## 2024-03-02 RX ADMIN — POTASSIUM BICARBONATE 40 MEQ: 782 TABLET, EFFERVESCENT ORAL at 22:28

## 2024-03-02 RX ADMIN — INSULIN LISPRO 1 UNITS: 100 INJECTION, SOLUTION INTRAVENOUS; SUBCUTANEOUS at 12:10

## 2024-03-02 RX ADMIN — CARVEDILOL 6.25 MG: 6.25 TABLET, FILM COATED ORAL at 17:45

## 2024-03-02 RX ADMIN — APIXABAN 2.5 MG: 2.5 TABLET, FILM COATED ORAL at 09:32

## 2024-03-02 RX ADMIN — POTASSIUM BICARBONATE 40 MEQ: 782 TABLET, EFFERVESCENT ORAL at 09:32

## 2024-03-02 RX ADMIN — Medication 10 ML: at 09:39

## 2024-03-02 RX ADMIN — PHENOL 1 SPRAY: 1.4 SPRAY ORAL at 14:27

## 2024-03-02 RX ADMIN — PANTOPRAZOLE SODIUM 40 MG: 40 INJECTION, POWDER, FOR SOLUTION INTRAVENOUS at 09:32

## 2024-03-02 ASSESSMENT — PAIN DESCRIPTION - LOCATION: LOCATION: GENERALIZED

## 2024-03-02 ASSESSMENT — PAIN DESCRIPTION - ORIENTATION: ORIENTATION: LEFT

## 2024-03-02 ASSESSMENT — PAIN SCALES - GENERAL: PAINLEVEL_OUTOF10: 3

## 2024-03-02 NOTE — FLOWSHEET NOTE
03/02/24 0500   Vitals   /67   Temp 98.4 °F (36.9 °C)   Temp Source Axillary   Pulse 79   Respirations 16   Observations & Evaluations   Level of Consciousness 0   Heart Rhythm Regular   Respiratory Quality/Effort Unlabored   O2 Device None (Room air)   Skin Color Other (comment)  (Appropriate for ethnicity)   Abdomen Inspection Soft;Rounded   Edema None   Peritoneal Dialysis Catheter Mid lower abdomen   No placement date or time found.   Catheter Location: Mid lower abdomen   Status Deaccessed   Site Condition Clean, dry, intact   Dressing Status Clean, dry & intact   Dressing Gauze   Date of Last Dressing Change 03/01/24   Dialysis Type Continuous cycling   Catheter Care Given Yes  (Two minute Alcavis scrub/soak performed prior to disconnection.)   Post-Treatment (Cycler)   Average Dwell Time (Hours:Minutes) 1:21   Lost Dwell Time (Hours:Minutes) :05   Effluent Appearance Clear;Yellow   PD Output (mL) 527 mL  (Idrain 123 ml + total  ml = net  ml)     .Primary RN SBAR: Joanie Thornton RN  Comments: After disconnection, sterile mini cap placed and PD catheter taped securely to the patient's abdomen.  Used cassette, lines and bags discarded in red bin.

## 2024-03-02 NOTE — PROGRESS NOTES
Hospitalist Progress Note  Jean-Claude Castaneda MD  Answering service: [phone] OR [phone] from in house phone        Date of Service:  3/2/2024  NAME:  Galilea Aguilar  :  1955  MRN:  [id number]      Admission Summary:     HPI: \"Galilea Aguilar is a 68 y.o. female with a history of CAD, COPD, CVA, diabetes, ESRD on peritoneal dialysis who presented to the ED with chief complaint of vomiting and left side/flank pain. In the ED, labs consistent with known renal dysfunction. QTc was found to be prolonged (554). CT demonstrated a large right retroperitoneal mass though has been present on several CT scans since . Patient treated with IV Valium for nausea and referred to hospitalists for further management.      Patient was seen and examined this morning in the ED, she was lying in bed in no acute distress -though did notice her having \"dry heaves\". Patient was recently admitted from  - 2024 for generalized abdominal pain, diagnosed with severe sepsis secondary to peritonitis and was discharged on vancomycin to be given through her peritoneal dialysis fluid and has also been prescribed Diflucan daily for the next month while on antibiotics.  She reports that since she has been discharged, she has had nausea and vomiting that has been persistent and has had difficulty keeping anything down.  Denies any headaches or dizziness, no chest pain or pressure, no shortness of breath or coughing.  Reports no diarrhea or constipation at home and has no abdominal pain.       Medication reconciliation completed with patient at bedside.      Addendum 1540:   - Tylenol not sufficient  for left sided flank discomfort  - give low dose morphine as she is having difficulty tolerating po. Was given 4 mg last night.  - add compazine as alternate nausea mediction\"       Interval history / Subjective:     Patient seen examined at  SpO2 95%   BMI 27.88 kg/m²        Intake/Output Summary (Last 24 hours) at 3/2/2024 0739  Last data filed at 3/2/2024 0500  Gross per 24 hour   Intake 140 ml   Output 527 ml   Net -387 ml          Physical Examination:     I had a face to face encounter with this patient and independently examined them on 3/2/2024 as outlined below:          General : Sleepy wakeful to voice but went back to sleep, no acute distress,   HEENT: NGT in place, PEERL, EOMI, moist mucus membrane  Neck: supple, no JVD, no meningeal signs  Chest: Clear to auscultation bilaterally   CVS: S1 S2 heard, Capillary refill less than 2 seconds  Abd: soft/ non tender, non distended, BS physiological,   Ext: no clubbing, no cyanosis, no edema, brisk 2+ DP pulses  Neuro/Psych: Sleepy, oriented to self, follow simple command, moves all extremities  Skin: warm         Data Review:    Review and/or order of clinical lab test  Review and/or order of tests in the radiology section of CPT  Review and/or order of tests in the medicine section of CPT    I have independently reviewed and interpreted patient's lab and all other diagnostic data    Notes reviewed from all clinical/nonclinical/nursing services involved in patient's clinical care. Care coordination discussions were held with appropriate clinical/nonclinical/ nursing providers based on care coordination needs.     Labs:     No results for input(s): \"WBC\", \"HGB\", \"HCT\", \"PLT\" in the last 72 hours.    Recent Labs     02/29/24  0751 03/01/24  0528 03/02/24  0533   * 133* 134*   K 3.5 2.9* 2.7*   CL 97 99 99   CO2 26 28 29   BUN 21* 22* 21*   MG 1.8 1.8 1.9   PHOS 4.2 3.7 3.5       Recent Labs     02/29/24  0751 03/01/24  0528   ALT 14 14   GLOB 4.6* 4.6*       No results for input(s): \"INR\", \"APTT\" in the last 72 hours.    Invalid input(s): \"PTP\"   No results for input(s): \"TIBC\", \"FERR\" in the last 72 hours.    Invalid input(s): \"FE\", \"PSAT\"   No results found for: \"FOL\", \"RBCF\"   No results

## 2024-03-02 NOTE — FLOWSHEET NOTE
PD CONNECTION   03/02/24 1519   Vitals   BP (!) 111/57   Temp 98.9 °F (37.2 °C)   Temp Source Axillary   Pulse 77   Respirations 20   SpO2 98 %   Observations & Evaluations   Level of Consciousness 1   Heart Rhythm   (monitored remotely)   Respiratory Quality/Effort Unlabored   O2 Device None (Room air)   Skin Condition/Temp Dry;Warm   Abdomen Inspection Soft;Rounded   Edema None   Peritoneal Dialysis Catheter Mid lower abdomen   No placement date or time found.   Catheter Location: Mid lower abdomen   Status Accessed   Site Condition Clean, dry, intact   Dressing Status Clean, dry & intact   Dressing Gauze  (with gentamicin cream)   Date of Last Dressing Change 03/02/24   Dialysis Type Continuous cycling   Exit Site Condition good   Catheter Care Given Yes  (one minute alcavis scrub followed by one minute soak prior to sterile connection)   Cycler   Verification of Prescription CCPD   Informed Consent  Yes   Total Volume Programmed 44487 mL   Therapy Time (Hours:Minutes) 9:30   Cycler Type Jung HomeChoice   Fill Volume 2500 mL   Last Fill Volume 0 mL   Dextrose Setting Same (Nonextraneal)   Number of Cycles 5   Bag Volume 6000 mL   Number of Bags Used 3     Time Out (time): 1515  Primary RN SBAR: SHAGUFTA Tomlinson RN   Patient Education: will need follow up due to mental status catheter care   Hepatitis B Surface Ag   Date/Time Value Ref Range Status   02/26/2024 04:19 AM <0.10 Index Final     Hep B S Ab   Date/Time Value Ref Range Status   02/29/2024 07:51 AM <3.10 mIU/mL Final      1515 orders, labs, consents ( chronic consent conveys) and code status reviewed, upon arrival primary care nurse in room SBAR obtained, responds to voice, PD treatment initiated as ordered.  Bed in low position, call bell within reach side rails up x 3, patient being monitored.    Start time:  1530  End 0100

## 2024-03-02 NOTE — PROGRESS NOTES
[facility]AN[facility]      Nephrology  Note  Galilea Aguilar  Date of Admission : 2/21/2024    CC:  Follow up for ESRD       Assessment and Plan     ESRD on PD   - Continue with current CCPD orders.    -Adjusted UF due to hypotension  -Resume IVF with KCl  -Daily labs    Severe hypokalemia  -Ordered replacement  -IVF with KCl    Recent PD peritonitis  - cx positive for staph epi  -Completed IV antibiotics  -No further IP antibiotics planned    Severe hypoalbuminemia  -2/2 poor nutrition  -On TF now    Encephalopathy:  -?  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 Renal cysts  -S/p post pylori feeding tube     Anemia of CKD  -Continue LIS    MBD of CKD  - stable     Prolonged QT interval   HTN   DM  COPD  CAD         Interval History:  Patient seen and examined.  Persistent confusion but significantly better.  Tolerating postpyloric tube feeds.  Potassium down to 2.7.  PD without issues last night.  Had 500 cc plus UF overnight.  Hypotensive to 80 systolic.    Current Medications: all current  Medications have been eviewed in EPIC  Review of Systems: A comprehensive review of systems was negative.    Objective:  Vitals:    Vitals:    03/02/24 0400 03/02/24 0500 03/02/24 0600 03/02/24 0715   BP:  126/67     Pulse: 81 79 80 79   Resp:  16  20   Temp:  98.4 °F (36.9 °C)     TempSrc:  Axillary     SpO2:    95%   Weight:       Height:         Intake and Output:  No intake/output data recorded.  02/29 1901 - 03/02 0700  In: 2401.5 [P.O.:50; I.V.:1651.5]  Out: 527     Physical Examination:  General: Confusion resolving  Neck:  Supple, no mass  Resp:  Lungs CTA B/L, no wheezing , normal respiratory effort  CV:  Regular Rate/ Rhythm,  no murmur or rub,no  LE edema  GI:  Soft, NT, PD exit site dressing D/I.  Neurologic:  Confused oriented x 1      LABS:  Recent Labs      03/02/24  0533 03/01/24  0528 02/29/24  0751 02/28/24  0733   * 133* 135* 135*   K 2.7* 2.9* 3.5 3.6   CL 99 99 97 97   CO2 29 28 26 30   BUN 21* 22* 21* 20   CREATININE 4.75* 4.67* 4.94* 4.90*   CALCIUM 7.2* 7.8* 8.0* 7.8*   LABALBU  --  1.7* 1.7* 1.5*   PHOS 3.5 3.7 4.2 4.0   MG 1.9 1.8 1.8 1.8       Recent Labs     02/28/24  0733 02/27/24  0645 02/26/24  0418   WBC 3.4* 7.1 6.5   HGB 7.5* 7.9* 8.2*   HCT 23.5* 24.2* 25.9*   * 141* 140*       No results for input(s): \"WINSTON\", \"KU\", \"CLU\", \"CREAU\" in the last 720 hours.    Invalid input(s): \"PROU\"  No results found for: \"SDES\"  No components found for: \"CULT\"      Review of Medical Records: I have reviewed all pertinent labs, chart notes, microbiology data, radiology imaging this patient.  Medications list personally reviewed.  No change in PMH ,family and social history from Consult note.      Adria Mary MD  [facility] [address], [facility] [place] [address]  Phone: [phone]   [phone]

## 2024-03-03 ENCOUNTER — APPOINTMENT (OUTPATIENT)
Facility: HOSPITAL | Age: 69
End: 2024-03-03
Payer: MEDICARE

## 2024-03-03 LAB
ANION GAP SERPL CALC-SCNC: 17 MMOL/L (ref 5–15)
BUN SERPL-MCNC: 20 MG/DL (ref 6–20)
BUN/CREAT SERPL: 4 (ref 12–20)
CALCIUM SERPL-MCNC: 6.5 MG/DL (ref 8.5–10.1)
CHLORIDE SERPL-SCNC: 102 MMOL/L (ref 97–108)
CO2 SERPL-SCNC: 15 MMOL/L (ref 21–32)
CREAT SERPL-MCNC: 4.66 MG/DL (ref 0.55–1.02)
GLUCOSE BLD STRIP.AUTO-MCNC: 256 MG/DL (ref 65–117)
GLUCOSE BLD STRIP.AUTO-MCNC: 261 MG/DL (ref 65–117)
GLUCOSE BLD STRIP.AUTO-MCNC: 292 MG/DL (ref 65–117)
GLUCOSE BLD STRIP.AUTO-MCNC: 309 MG/DL (ref 65–117)
GLUCOSE SERPL-MCNC: 195 MG/DL (ref 65–100)
MAGNESIUM SERPL-MCNC: 1.6 MG/DL (ref 1.6–2.4)
PHOSPHATE SERPL-MCNC: 2.2 MG/DL (ref 2.6–4.7)
POTASSIUM SERPL-SCNC: 3.7 MMOL/L (ref 3.5–5.1)
SERVICE CMNT-IMP: ABNORMAL
SODIUM SERPL-SCNC: 134 MMOL/L (ref 136–145)

## 2024-03-03 PROCEDURE — 82962 GLUCOSE BLOOD TEST: CPT

## 2024-03-03 PROCEDURE — 90945 DIALYSIS ONE EVALUATION: CPT

## 2024-03-03 PROCEDURE — C9113 INJ PANTOPRAZOLE SODIUM, VIA: HCPCS | Performed by: NURSE PRACTITIONER

## 2024-03-03 PROCEDURE — 84100 ASSAY OF PHOSPHORUS: CPT

## 2024-03-03 PROCEDURE — 71045 X-RAY EXAM CHEST 1 VIEW: CPT

## 2024-03-03 PROCEDURE — 2580000003 HC RX 258: Performed by: NURSE PRACTITIONER

## 2024-03-03 PROCEDURE — 2060000000 HC ICU INTERMEDIATE R&B

## 2024-03-03 PROCEDURE — 6370000000 HC RX 637 (ALT 250 FOR IP): Performed by: NURSE PRACTITIONER

## 2024-03-03 PROCEDURE — 94640 AIRWAY INHALATION TREATMENT: CPT

## 2024-03-03 PROCEDURE — 70551 MRI BRAIN STEM W/O DYE: CPT

## 2024-03-03 PROCEDURE — 6370000000 HC RX 637 (ALT 250 FOR IP): Performed by: STUDENT IN AN ORGANIZED HEALTH CARE EDUCATION/TRAINING PROGRAM

## 2024-03-03 PROCEDURE — 6360000002 HC RX W HCPCS: Performed by: NURSE PRACTITIONER

## 2024-03-03 PROCEDURE — A4216 STERILE WATER/SALINE, 10 ML: HCPCS | Performed by: NURSE PRACTITIONER

## 2024-03-03 PROCEDURE — 36415 COLL VENOUS BLD VENIPUNCTURE: CPT

## 2024-03-03 PROCEDURE — 83735 ASSAY OF MAGNESIUM: CPT

## 2024-03-03 PROCEDURE — 80048 BASIC METABOLIC PNL TOTAL CA: CPT

## 2024-03-03 PROCEDURE — 6370000000 HC RX 637 (ALT 250 FOR IP): Performed by: INTERNAL MEDICINE

## 2024-03-03 RX ORDER — GUAIFENESIN 200 MG/10ML
200 LIQUID ORAL EVERY 4 HOURS PRN
Status: DISCONTINUED | OUTPATIENT
Start: 2024-03-03 | End: 2024-03-04

## 2024-03-03 RX ORDER — SODIUM BICARBONATE 650 MG/1
1300 TABLET ORAL 2 TIMES DAILY
Status: DISCONTINUED | OUTPATIENT
Start: 2024-03-03 | End: 2024-03-04

## 2024-03-03 RX ORDER — VITAMIN B COMPLEX
5000 TABLET ORAL DAILY
Status: DISCONTINUED | OUTPATIENT
Start: 2024-03-03 | End: 2024-03-16 | Stop reason: HOSPADM

## 2024-03-03 RX ADMIN — INSULIN LISPRO 2 UNITS: 100 INJECTION, SOLUTION INTRAVENOUS; SUBCUTANEOUS at 12:11

## 2024-03-03 RX ADMIN — ARFORMOTEROL TARTRATE: 15 SOLUTION RESPIRATORY (INHALATION) at 20:04

## 2024-03-03 RX ADMIN — PHENOL 1 SPRAY: 1.4 SPRAY ORAL at 09:03

## 2024-03-03 RX ADMIN — POTASSIUM BICARBONATE 40 MEQ: 782 TABLET, EFFERVESCENT ORAL at 22:14

## 2024-03-03 RX ADMIN — Medication 100 MG: at 08:55

## 2024-03-03 RX ADMIN — ROSUVASTATIN CALCIUM 10 MG: 10 TABLET, FILM COATED ORAL at 22:14

## 2024-03-03 RX ADMIN — INSULIN LISPRO 4 UNITS: 100 INJECTION, SOLUTION INTRAVENOUS; SUBCUTANEOUS at 22:59

## 2024-03-03 RX ADMIN — POTASSIUM BICARBONATE 40 MEQ: 782 TABLET, EFFERVESCENT ORAL at 08:55

## 2024-03-03 RX ADMIN — Medication 3 MG: at 22:14

## 2024-03-03 RX ADMIN — Medication 10 ML: at 22:15

## 2024-03-03 RX ADMIN — PANTOPRAZOLE SODIUM 40 MG: 40 INJECTION, POWDER, FOR SOLUTION INTRAVENOUS at 08:55

## 2024-03-03 RX ADMIN — SODIUM BICARBONATE 1300 MG: 650 TABLET ORAL at 22:14

## 2024-03-03 RX ADMIN — ASPIRIN 81 MG CHEWABLE TABLET 81 MG: 81 TABLET CHEWABLE at 08:55

## 2024-03-03 RX ADMIN — CARVEDILOL 6.25 MG: 6.25 TABLET, FILM COATED ORAL at 08:55

## 2024-03-03 RX ADMIN — INSULIN LISPRO 2 UNITS: 100 INJECTION, SOLUTION INTRAVENOUS; SUBCUTANEOUS at 17:35

## 2024-03-03 RX ADMIN — Medication 10 ML: at 08:56

## 2024-03-03 RX ADMIN — INSULIN LISPRO 2 UNITS: 100 INJECTION, SOLUTION INTRAVENOUS; SUBCUTANEOUS at 09:01

## 2024-03-03 RX ADMIN — DULOXETINE HYDROCHLORIDE 20 MG: 20 CAPSULE, DELAYED RELEASE ORAL at 08:55

## 2024-03-03 RX ADMIN — DULOXETINE HYDROCHLORIDE 20 MG: 20 CAPSULE, DELAYED RELEASE ORAL at 22:14

## 2024-03-03 RX ADMIN — APIXABAN 2.5 MG: 2.5 TABLET, FILM COATED ORAL at 08:55

## 2024-03-03 RX ADMIN — MONTELUKAST 10 MG: 10 TABLET, FILM COATED ORAL at 22:14

## 2024-03-03 RX ADMIN — CARVEDILOL 6.25 MG: 6.25 TABLET, FILM COATED ORAL at 17:35

## 2024-03-03 RX ADMIN — SODIUM BICARBONATE 1300 MG: 650 TABLET ORAL at 10:06

## 2024-03-03 RX ADMIN — ACETAMINOPHEN 650 MG: 325 TABLET ORAL at 22:14

## 2024-03-03 RX ADMIN — GENTAMICIN SULFATE: 1 CREAM TOPICAL at 15:42

## 2024-03-03 RX ADMIN — APIXABAN 2.5 MG: 2.5 TABLET, FILM COATED ORAL at 22:14

## 2024-03-03 RX ADMIN — Medication 5000 UNITS: at 10:07

## 2024-03-03 RX ADMIN — ARFORMOTEROL TARTRATE: 15 SOLUTION RESPIRATORY (INHALATION) at 07:29

## 2024-03-03 NOTE — FLOWSHEET NOTE
03/03/24 1545   Observations & Evaluations   Level of Consciousness 1   Respiratory Quality/Effort Unlabored   O2 Device None (Room air)   Skin Condition/Temp Dry;Warm   Edema None   Peritoneal Dialysis Catheter Mid lower abdomen   No placement date or time found.   Catheter Location: Mid lower abdomen   Status Accessed   Site Condition Clean, dry, intact   Dressing Status New dressing applied   Dressing Gauze   Date of Last Dressing Change 03/03/24   Dialysis Type Continuous cycling   Exit Site Condition excellent   Catheter Care Given Yes   Cycler   Verification of Prescription CCPD   Informed Consent  Yes   Total Volume Programmed 38501 mL   Therapy Time (Hours:Minutes) 9:30   Cycler Type Jung HomeChoice   Fill Volume 2500 mL   Last Fill Volume 0 mL   Dextrose Setting Same (Nonextraneal)   I Drain Alarm 0 mL   Number of Cycles 5   Bag Volume 6000 mL   Number of Bags Used 3   Dianeal Solution   (Dextrose 1.5% in 6000 ml)     Time Out (time): 1540    Primary RN SBAR: Alisson Doe, RN    Patient Education: access care, procedure  Hepatitis B Surface Ag   Date/Time Value Ref Range Status   02/26/2024 04:19 AM <0.10 Index Final     Hep B S Ab   Date/Time Value Ref Range Status   02/29/2024 07:51 AM <3.10 mIU/mL Final

## 2024-03-03 NOTE — PROGRESS NOTES
[facility]FF[facility]      Nephrology  Note  Galilea Aguilar  Date of Admission : 2/21/2024    CC:  Follow up for ESRD       Assessment and Plan     ESRD on PD   - Continue with current CCPD orders.   - BP better: Stopped IVF  -Daily labs    Metabolic acidosis:  -Started oral bicarb    Hypo-K, mag, Phos,ca  -2/2 poor nutrition  -On Neutra-Phos, vitamin D3  -No Phos binders for now    Recent PD peritonitis  - cx positive for staph epi  -Completed IV antibiotics  -No further IP antibiotics planned    Severe hypoalbuminemia  -2/2 poor nutrition  -On TF now    Encephalopathy:  -?  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 Renal cysts  -S/p post pylori feeding tube     Anemia of CKD  -Continue LIS    MBD of CKD  - stable     Prolonged QT interval   HTN   DM  COPD  CAD         Interval History:  Patient seen and examined.  Remains lethargic.  Mental status much better but not back to baseline.  Metabolic acidosis and hypophosphatemia noted.    Current Medications: all current  Medications have been eviewed in EPIC  Review of Systems: A comprehensive review of systems was negative.    Objective:  Vitals:    Vitals:    03/03/24 0154 03/03/24 0356 03/03/24 0556 03/03/24 0801   BP:    128/62   Pulse: 80 80 82 84   Resp:    16   Temp:    97.3 °F (36.3 °C)   TempSrc:    Axillary   SpO2:    99%   Weight:       Height:         Intake and Output:  No intake/output data recorded.  03/01 1901 - 03/03 0700  In: 110 [P.O.:50]  Out: 654     Physical Examination:  General: Confusion resolving  Neck:  Supple, no mass  Resp:  Lungs CTA B/L, no wheezing , normal respiratory effort  CV:  Regular Rate/ Rhythm,  no murmur or rub,no  LE edema  GI:  Soft, NT, PD exit site dressing D/I.  Neurologic:  Confused oriented x 1      LABS:  Recent Labs     03/03/24  0520 03/02/24  0533

## 2024-03-03 NOTE — FLOWSHEET NOTE
03/03/24 0430   Observations & Evaluations   Level of Consciousness 1   Heart Rhythm Regular   Respiratory Quality/Effort Unlabored   O2 Device None (Room air)   Skin Color Other (comment)  (Appropriate for ethnicity)   Skin Condition/Temp Warm;Dry   Abdomen Inspection Soft   Edema None   Peritoneal Dialysis Catheter Mid lower abdomen   No placement date or time found.   Catheter Location: Mid lower abdomen   Status Deaccessed   Site Condition Clean, dry, intact   Dressing Status Clean, dry & intact   Dressing Gauze   Date of Last Dressing Change 03/02/24   Dialysis Type Continuous cycling   Catheter Care Given Yes  (Two minute Alcavis scrub/soak prior to disconnection)   Post-Treatment (Cycler)   Average Dwell Time (Hours:Minutes) 1:22   Lost Dwell Time (Hours:Minutes) :02   Effluent Appearance Clear;Yellow   PD Output (mL) 127 mL  (Idrain 277 + total UF (-150) = net  ml)     Primary RN SBAR: Idalmis aCmejo RN  Comments: After disconnection, sterile mini cap placed.  PD catheter taped securely to the patient's abdomen.  Used cassette, lines and bags discarded in red bin.

## 2024-03-03 NOTE — PROGRESS NOTES
Hospitalist Progress Note  Jean-Claude Castaneda MD  Answering service: [phone] OR [phone] from in house phone        Date of Service:  3/3/2024  NAME:  Galilea Aguilar  :  1955  MRN:  [id number]      Admission Summary:     HPI: \"Galilea Aguilar is a 68 y.o. female with a history of CAD, COPD, CVA, diabetes, ESRD on peritoneal dialysis who presented to the ED with chief complaint of vomiting and left side/flank pain. In the ED, labs consistent with known renal dysfunction. QTc was found to be prolonged (554). CT demonstrated a large right retroperitoneal mass though has been present on several CT scans since . Patient treated with IV Valium for nausea and referred to hospitalists for further management.      Patient was seen and examined this morning in the ED, she was lying in bed in no acute distress -though did notice her having \"dry heaves\". Patient was recently admitted from  - 2024 for generalized abdominal pain, diagnosed with severe sepsis secondary to peritonitis and was discharged on vancomycin to be given through her peritoneal dialysis fluid and has also been prescribed Diflucan daily for the next month while on antibiotics.  She reports that since she has been discharged, she has had nausea and vomiting that has been persistent and has had difficulty keeping anything down.  Denies any headaches or dizziness, no chest pain or pressure, no shortness of breath or coughing.  Reports no diarrhea or constipation at home and has no abdominal pain.       Medication reconciliation completed with patient at bedside.      Addendum 1540:   - Tylenol not sufficient  for left sided flank discomfort  - give low dose morphine as she is having difficulty tolerating po. Was given 4 mg last night.  - add compazine as alternate nausea mediction\"       Interval history / Subjective:     Patient seen examined at  solution 1 Dose  1 Dose Inhalation Q6H PRN    arformoterol 15 mcg-budesonide 0.25 mg neb solution   Nebulization BID RT    montelukast (SINGULAIR) tablet 10 mg  10 mg Oral Nightly    epoetin mohan-epbx (RETACRIT) injection 20,000 Units  20,000 Units SubCUTAneous Weekly    lidocaine 4 % external patch 1 patch  1 patch TransDERmal Daily    sodium chloride flush 0.9 % injection 5-40 mL  5-40 mL IntraVENous 2 times per day    sodium chloride flush 0.9 % injection 5-40 mL  5-40 mL IntraVENous PRN    0.9 % sodium chloride infusion   IntraVENous PRN    polyethylene glycol (GLYCOLAX) packet 17 g  17 g Oral Daily PRN    acetaminophen (TYLENOL) tablet 650 mg  650 mg Oral Q6H PRN    Or    acetaminophen (TYLENOL) suppository 650 mg  650 mg Rectal Q6H PRN    dianeal lo-brandi 1.5% 6,000 mL  6,000 mL IntraPERitoneal Daily    dianeal lo-brandi 1.5% 6,000 mL  6,000 mL IntraPERitoneal Daily    phenylephrine-mineral oil-petrolatum (PREPARATION H) rectal ointment   Rectal BID PRN    dextrose bolus 10% 125 mL  125 mL IntraVENous PRN    Or    dextrose bolus 10% 250 mL  250 mL IntraVENous PRN    glucagon injection 1 mg  1 mg SubCUTAneous PRN    dextrose 10 % infusion   IntraVENous Continuous PRN    insulin lispro (HUMALOG) injection vial 0-4 Units  0-4 Units SubCUTAneous TID WC    insulin lispro (HUMALOG) injection vial 0-4 Units  0-4 Units SubCUTAneous Nightly    gentamicin (GARAMYCIN) 0.1 % cream   Topical PRN    prochlorperazine (COMPAZINE) injection 5 mg  5 mg IntraVENous Q6H PRN     ______________________________________________________________________  EXPECTED LENGTH OF STAY: Unable to retrieve estimated LOS  ACTUAL LENGTH OF STAY:          10                 Jean-Claude Castaneda MD

## 2024-03-04 ENCOUNTER — APPOINTMENT (OUTPATIENT)
Facility: HOSPITAL | Age: 69
End: 2024-03-04
Payer: MEDICARE

## 2024-03-04 PROBLEM — E11.65 POORLY CONTROLLED DIABETES MELLITUS (HCC): Status: ACTIVE | Noted: 2024-03-04

## 2024-03-04 LAB
ALBUMIN SERPL-MCNC: 1.3 G/DL (ref 3.5–5)
ALBUMIN/GLOB SERPL: 0.3 (ref 1.1–2.2)
ALP SERPL-CCNC: 120 U/L (ref 45–117)
ALT SERPL-CCNC: 9 U/L (ref 12–78)
ANION GAP SERPL CALC-SCNC: 5 MMOL/L (ref 5–15)
ARTERIAL PATENCY WRIST A: POSITIVE
ARTERIAL PATENCY WRIST A: YES
AST SERPL-CCNC: 6 U/L (ref 15–37)
BASE EXCESS BLD CALC-SCNC: 2.7 MMOL/L
BASE EXCESS BLDA CALC-SCNC: 4.5 MMOL/L
BDY SITE: ABNORMAL
BDY SITE: ABNORMAL
BILIRUB DIRECT SERPL-MCNC: 0.1 MG/DL (ref 0–0.2)
BILIRUB SERPL-MCNC: 0.3 MG/DL (ref 0.2–1)
BUN SERPL-MCNC: 23 MG/DL (ref 6–20)
BUN/CREAT SERPL: 5 (ref 12–20)
CALCIUM SERPL-MCNC: 7 MG/DL (ref 8.5–10.1)
CHLORIDE SERPL-SCNC: 101 MMOL/L (ref 97–108)
CO2 SERPL-SCNC: 29 MMOL/L (ref 21–32)
CREAT SERPL-MCNC: 4.69 MG/DL (ref 0.55–1.02)
GAS FLOW.O2 O2 DELIVERY SYS: 1 L/MIN
GAS FLOW.O2 O2 DELIVERY SYS: ABNORMAL
GLOBULIN SER CALC-MCNC: 4.1 G/DL (ref 2–4)
GLUCOSE BLD STRIP.AUTO-MCNC: 148 MG/DL (ref 65–117)
GLUCOSE BLD STRIP.AUTO-MCNC: 299 MG/DL (ref 65–117)
GLUCOSE BLD STRIP.AUTO-MCNC: 305 MG/DL (ref 65–117)
GLUCOSE SERPL-MCNC: 230 MG/DL (ref 65–100)
HCO3 BLD-SCNC: 25.6 MMOL/L (ref 22–26)
HCO3 BLDA-SCNC: 29 MMOL/L (ref 22–26)
MAGNESIUM SERPL-MCNC: 1.8 MG/DL (ref 1.6–2.4)
O2/TOTAL GAS SETTING VFR VENT: 3 %
PCO2 BLD: 32 MMHG (ref 35–45)
PCO2 BLDA: 45 MMHG (ref 35–45)
PH BLD: 7.51 (ref 7.35–7.45)
PH BLDA: 7.44 (ref 7.35–7.45)
PHOSPHATE SERPL-MCNC: 1.8 MG/DL (ref 2.6–4.7)
PO2 BLD: 134 MMHG (ref 80–100)
PO2 BLDA: 142 MMHG (ref 80–100)
POTASSIUM SERPL-SCNC: 3.8 MMOL/L (ref 3.5–5.1)
PROT SERPL-MCNC: 5.4 G/DL (ref 6.4–8.2)
SAO2 % BLD: 99 % (ref 92–97)
SAO2 % BLD: 99.3 % (ref 92–97)
SAO2% DEVICE SAO2% SENSOR NAME: ABNORMAL
SERVICE CMNT-IMP: ABNORMAL
SODIUM SERPL-SCNC: 135 MMOL/L (ref 136–145)
SPECIMEN SITE: ABNORMAL
SPECIMEN TYPE: ABNORMAL

## 2024-03-04 PROCEDURE — 6370000000 HC RX 637 (ALT 250 FOR IP): Performed by: STUDENT IN AN ORGANIZED HEALTH CARE EDUCATION/TRAINING PROGRAM

## 2024-03-04 PROCEDURE — A4216 STERILE WATER/SALINE, 10 ML: HCPCS | Performed by: NURSE PRACTITIONER

## 2024-03-04 PROCEDURE — 80076 HEPATIC FUNCTION PANEL: CPT

## 2024-03-04 PROCEDURE — 94760 N-INVAS EAR/PLS OXIMETRY 1: CPT

## 2024-03-04 PROCEDURE — 71045 X-RAY EXAM CHEST 1 VIEW: CPT

## 2024-03-04 PROCEDURE — 6370000000 HC RX 637 (ALT 250 FOR IP): Performed by: HOSPITALIST

## 2024-03-04 PROCEDURE — 97530 THERAPEUTIC ACTIVITIES: CPT

## 2024-03-04 PROCEDURE — 2580000003 HC RX 258: Performed by: NURSE PRACTITIONER

## 2024-03-04 PROCEDURE — 36600 WITHDRAWAL OF ARTERIAL BLOOD: CPT

## 2024-03-04 PROCEDURE — 6370000000 HC RX 637 (ALT 250 FOR IP): Performed by: INTERNAL MEDICINE

## 2024-03-04 PROCEDURE — 6360000002 HC RX W HCPCS: Performed by: NURSE PRACTITIONER

## 2024-03-04 PROCEDURE — C9113 INJ PANTOPRAZOLE SODIUM, VIA: HCPCS | Performed by: NURSE PRACTITIONER

## 2024-03-04 PROCEDURE — 83735 ASSAY OF MAGNESIUM: CPT

## 2024-03-04 PROCEDURE — 6370000000 HC RX 637 (ALT 250 FOR IP): Performed by: NURSE PRACTITIONER

## 2024-03-04 PROCEDURE — 99233 SBSQ HOSP IP/OBS HIGH 50: CPT | Performed by: CLINICAL NURSE SPECIALIST

## 2024-03-04 PROCEDURE — 80048 BASIC METABOLIC PNL TOTAL CA: CPT

## 2024-03-04 PROCEDURE — 2500000003 HC RX 250 WO HCPCS: Performed by: NURSE PRACTITIONER

## 2024-03-04 PROCEDURE — 90945 DIALYSIS ONE EVALUATION: CPT

## 2024-03-04 PROCEDURE — 2700000000 HC OXYGEN THERAPY PER DAY

## 2024-03-04 PROCEDURE — 36415 COLL VENOUS BLD VENIPUNCTURE: CPT

## 2024-03-04 PROCEDURE — 2580000003 HC RX 258: Performed by: INTERNAL MEDICINE

## 2024-03-04 PROCEDURE — 2060000000 HC ICU INTERMEDIATE R&B

## 2024-03-04 PROCEDURE — 82962 GLUCOSE BLOOD TEST: CPT

## 2024-03-04 PROCEDURE — 84100 ASSAY OF PHOSPHORUS: CPT

## 2024-03-04 PROCEDURE — 94640 AIRWAY INHALATION TREATMENT: CPT

## 2024-03-04 PROCEDURE — 97535 SELF CARE MNGMENT TRAINING: CPT

## 2024-03-04 PROCEDURE — 82803 BLOOD GASES ANY COMBINATION: CPT

## 2024-03-04 PROCEDURE — 6370000000 HC RX 637 (ALT 250 FOR IP): Performed by: CLINICAL NURSE SPECIALIST

## 2024-03-04 RX ORDER — SODIUM CHLORIDE, SODIUM LACTATE, CALCIUM CHLORIDE, MAGNESIUM CHLORIDE AND DEXTROSE 2.5; 538; 448; 18.3; 5.08 G/100ML; MG/100ML; MG/100ML; MG/100ML; MG/100ML
6000 INJECTION, SOLUTION INTRAPERITONEAL CONTINUOUS
Status: DISCONTINUED | OUTPATIENT
Start: 2024-03-04 | End: 2024-03-04

## 2024-03-04 RX ORDER — SODIUM CHLORIDE, SODIUM LACTATE, CALCIUM CHLORIDE, MAGNESIUM CHLORIDE AND DEXTROSE 2.5; 538; 448; 18.3; 5.08 G/100ML; MG/100ML; MG/100ML; MG/100ML; MG/100ML
6000 INJECTION, SOLUTION INTRAPERITONEAL EVERY EVENING
Status: DISCONTINUED | OUTPATIENT
Start: 2024-03-04 | End: 2024-03-04

## 2024-03-04 RX ORDER — SODIUM BICARBONATE 650 MG/1
650 TABLET ORAL 2 TIMES DAILY
Status: DISCONTINUED | OUTPATIENT
Start: 2024-03-04 | End: 2024-03-11

## 2024-03-04 RX ORDER — SODIUM CHLORIDE, SODIUM LACTATE, CALCIUM CHLORIDE, MAGNESIUM CHLORIDE AND DEXTROSE 2.5; 538; 448; 18.3; 5.08 G/100ML; MG/100ML; MG/100ML; MG/100ML; MG/100ML
6000 INJECTION, SOLUTION INTRAPERITONEAL DAILY
Status: DISCONTINUED | OUTPATIENT
Start: 2024-03-04 | End: 2024-03-08

## 2024-03-04 RX ORDER — SODIUM CHLORIDE, SODIUM LACTATE, CALCIUM CHLORIDE, MAGNESIUM CHLORIDE AND DEXTROSE 4.25; 538; 448; 18.3; 5.08 G/100ML; MG/100ML; MG/100ML; MG/100ML; MG/100ML
2000 INJECTION, SOLUTION INTRAPERITONEAL
Status: COMPLETED | OUTPATIENT
Start: 2024-03-04 | End: 2024-03-04

## 2024-03-04 RX ORDER — SODIUM CHLORIDE, SODIUM LACTATE, CALCIUM CHLORIDE, MAGNESIUM CHLORIDE AND DEXTROSE 1.5; 538; 448; 18.3; 5.08 G/100ML; MG/100ML; MG/100ML; MG/100ML; MG/100ML
6000 INJECTION, SOLUTION INTRAPERITONEAL DAILY
Status: DISCONTINUED | OUTPATIENT
Start: 2024-03-04 | End: 2024-03-04 | Stop reason: DRUGHIGH

## 2024-03-04 RX ORDER — GUAIFENESIN 200 MG/10ML
400 LIQUID ORAL EVERY 4 HOURS PRN
Status: DISCONTINUED | OUTPATIENT
Start: 2024-03-04 | End: 2024-03-16 | Stop reason: HOSPADM

## 2024-03-04 RX ORDER — IPRATROPIUM BROMIDE AND ALBUTEROL SULFATE 2.5; .5 MG/3ML; MG/3ML
1 SOLUTION RESPIRATORY (INHALATION) EVERY 4 HOURS PRN
Status: DISCONTINUED | OUTPATIENT
Start: 2024-03-04 | End: 2024-03-16 | Stop reason: HOSPADM

## 2024-03-04 RX ADMIN — GUAIFENESIN 200 MG: 200 SOLUTION ORAL at 02:34

## 2024-03-04 RX ADMIN — SODIUM CHLORIDE, SODIUM LACTATE, CALCIUM CHLORIDE, MAGNESIUM CHLORIDE AND DEXTROSE 2000 ML: 4.25; 538; 448; 18.3; 5.08 INJECTION, SOLUTION INTRAPERITONEAL at 07:52

## 2024-03-04 RX ADMIN — SODIUM CHLORIDE, SODIUM LACTATE, CALCIUM CHLORIDE, MAGNESIUM CHLORIDE AND DEXTROSE 6000 ML: 1.5; 538; 448; 18.3; 5.08 INJECTION, SOLUTION INTRAPERITONEAL at 19:18

## 2024-03-04 RX ADMIN — ASPIRIN 81 MG CHEWABLE TABLET 81 MG: 81 TABLET CHEWABLE at 10:04

## 2024-03-04 RX ADMIN — INSULIN LISPRO 2 UNITS: 100 INJECTION, SOLUTION INTRAVENOUS; SUBCUTANEOUS at 10:02

## 2024-03-04 RX ADMIN — APIXABAN 2.5 MG: 2.5 TABLET, FILM COATED ORAL at 21:42

## 2024-03-04 RX ADMIN — CARVEDILOL 6.25 MG: 6.25 TABLET, FILM COATED ORAL at 17:24

## 2024-03-04 RX ADMIN — IPRATROPIUM BROMIDE AND ALBUTEROL SULFATE 1 DOSE: .5; 3 SOLUTION RESPIRATORY (INHALATION) at 13:12

## 2024-03-04 RX ADMIN — SODIUM BICARBONATE 1300 MG: 650 TABLET ORAL at 10:05

## 2024-03-04 RX ADMIN — SODIUM CHLORIDE, SODIUM LACTATE, CALCIUM CHLORIDE, MAGNESIUM CHLORIDE AND DEXTROSE 6000 ML: 2.5; 538; 448; 18.3; 5.08 INJECTION, SOLUTION INTRAPERITONEAL at 19:17

## 2024-03-04 RX ADMIN — GENTAMICIN SULFATE: 1 CREAM TOPICAL at 19:25

## 2024-03-04 RX ADMIN — INSULIN LISPRO 3 UNITS: 100 INJECTION, SOLUTION INTRAVENOUS; SUBCUTANEOUS at 12:26

## 2024-03-04 RX ADMIN — ROSUVASTATIN CALCIUM 10 MG: 10 TABLET, FILM COATED ORAL at 21:42

## 2024-03-04 RX ADMIN — SODIUM BICARBONATE 650 MG: 650 TABLET ORAL at 21:42

## 2024-03-04 RX ADMIN — Medication 100 MG: at 10:05

## 2024-03-04 RX ADMIN — DULOXETINE HYDROCHLORIDE 20 MG: 20 CAPSULE, DELAYED RELEASE ORAL at 21:42

## 2024-03-04 RX ADMIN — APIXABAN 2.5 MG: 2.5 TABLET, FILM COATED ORAL at 10:02

## 2024-03-04 RX ADMIN — SODIUM CHLORIDE, SODIUM LACTATE, CALCIUM CHLORIDE, MAGNESIUM CHLORIDE AND DEXTROSE 6000 ML: 2.5; 538; 448; 18.3; 5.08 INJECTION, SOLUTION INTRAPERITONEAL at 19:24

## 2024-03-04 RX ADMIN — CARVEDILOL 6.25 MG: 6.25 TABLET, FILM COATED ORAL at 10:21

## 2024-03-04 RX ADMIN — POTASSIUM PHOSPHATE, MONOBASIC POTASSIUM PHOSPHATE, DIBASIC 30 MMOL: 224; 236 INJECTION, SOLUTION, CONCENTRATE INTRAVENOUS at 17:22

## 2024-03-04 RX ADMIN — Medication 5 UNITS: at 21:42

## 2024-03-04 RX ADMIN — ARFORMOTEROL TARTRATE: 15 SOLUTION RESPIRATORY (INHALATION) at 07:03

## 2024-03-04 RX ADMIN — ARFORMOTEROL TARTRATE: 15 SOLUTION RESPIRATORY (INHALATION) at 21:31

## 2024-03-04 RX ADMIN — Medication 10 ML: at 21:42

## 2024-03-04 RX ADMIN — DULOXETINE HYDROCHLORIDE 20 MG: 20 CAPSULE, DELAYED RELEASE ORAL at 10:04

## 2024-03-04 RX ADMIN — ACETAMINOPHEN 650 MG: 325 TABLET ORAL at 10:25

## 2024-03-04 RX ADMIN — MONTELUKAST 10 MG: 10 TABLET, FILM COATED ORAL at 21:42

## 2024-03-04 RX ADMIN — PANTOPRAZOLE SODIUM 40 MG: 40 INJECTION, POWDER, FOR SOLUTION INTRAVENOUS at 10:03

## 2024-03-04 RX ADMIN — Medication 10 ML: at 10:19

## 2024-03-04 RX ADMIN — Medication 5000 UNITS: at 10:14

## 2024-03-04 ASSESSMENT — PAIN SCALES - GENERAL
PAINLEVEL_OUTOF10: 5
PAINLEVEL_OUTOF10: 8
PAINLEVEL_OUTOF10: 9

## 2024-03-04 ASSESSMENT — PAIN DESCRIPTION - LOCATION
LOCATION: BACK
LOCATION: GENERALIZED

## 2024-03-04 ASSESSMENT — PAIN DESCRIPTION - PAIN TYPE: TYPE: CHRONIC PAIN

## 2024-03-04 ASSESSMENT — PAIN DESCRIPTION - ORIENTATION: ORIENTATION: POSTERIOR

## 2024-03-04 ASSESSMENT — PAIN DESCRIPTION - DESCRIPTORS: DESCRIPTORS: ACHING

## 2024-03-04 NOTE — FLOWSHEET NOTE
PD MANUAL EXCHANGE   03/04/24 0815   Vitals   BP (!) 105/57   Pulse 82   Respirations 24   SpO2 100 %   Observations & Evaluations   Level of Consciousness 0   Heart Rhythm Regular   Respiratory Quality/Effort Accessory muscle use  (abdominal use of muscles on expiration)   O2 Device Nasal cannula  (1 liter nasal cannula)   Bilateral Breath Sounds Rhonchi   Skin Condition/Temp Warm;Dry   Abdomen Inspection Soft;Rounded   Peritoneal Dialysis Catheter Mid lower abdomen   No placement date or time found.   Catheter Location: Mid lower abdomen   Status Accessed  (one minute alcavis scrub followed by one minute soak pre/post and sterile minicap)   Dressing Status Clean, dry & intact   Dressing Gauze   Date of Last Dressing Change 03/03/24   Catheter Care Given Yes  (one minute alcavis scrub followed by one mintue soak)   Peritoneal Dialysis (CAPD manual)   Exchange Number 1   Dianeal Solution Dextrose 4.25% in 2000 mL   Peritoneal Input Status Started  (0800)   Peritoneal Output Status Completed  (0815)     Time Out (time): 0755  Primary RN SBAR: KAMILAH Camejo RN   Patient Education: procedural manual exchange for UF  Hepatitis B Surface Ag   Date/Time Value Ref Range Status   02/26/2024 04:19 AM <0.10 Index Final     Hep B S Ab   Date/Time Value Ref Range Status   02/29/2024 07:51 AM <3.10 mIU/mL Final      Comments:  orders, labs, consent (chronic consent conveys) and code status reviewed.  2 liters of 4.25% dwell for 2 hour for fluid removal.   SBAR with primary nurse at bedside KAMILAH Camejo RN with oncoming nurse.  On departure bed in low position, side rails up x 3, call bell within reach,

## 2024-03-04 NOTE — PROGRESS NOTES
NC  CV:  Regular Rate/ Rhythm,  no murmur or rub,no  LE edema  GI:  Soft, NT, PD exit site dressing D/I.  Neurologic:  Oriented person/ place      LABS:  Recent Labs     03/04/24  0638 03/03/24  0520 03/02/24  0533 03/01/24  0528 02/29/24  0751   * 134* 134* 133* 135*   K 3.8 3.7 2.7* 2.9* 3.5    102 99 99 97   CO2 29 15* 29 28 26   BUN 23* 20 21* 22* 21*   CREATININE 4.69* 4.66* 4.75* 4.67* 4.94*   CALCIUM 7.0* 6.5* 7.2* 7.8* 8.0*   LABALBU 1.3*  --   --  1.7* 1.7*   PHOS 1.8* 2.2* 3.5 3.7 4.2   MG 1.8 1.6 1.9 1.8 1.8       Recent Labs     02/28/24  0733 02/27/24  0645 02/26/24  0418   WBC 3.4* 7.1 6.5   HGB 7.5* 7.9* 8.2*   HCT 23.5* 24.2* 25.9*   * 141* 140*       No results for input(s): \"WINSTON\", \"KU\", \"CLU\", \"CREAU\" in the last 720 hours.    Invalid input(s): \"PROU\"  No results found for: \"SDES\"  No components found for: \"CULT\"      Review of Medical Records: I have reviewed all pertinent labs, chart notes, microbiology data, radiology imaging this patient.  Medications list personally reviewed.  No change in PMH ,family and social history from Consult note.      DAYA Cao - NP  [facility] [address], [facility] [place] [address]  Phone: [phone]   [phone]

## 2024-03-04 NOTE — PROGRESS NOTES
[facility]  [address]  [place] [address] [phone]                     GI PROGRESS NOTE    Patient Name: Galilea Aguilar      : 1955      MRN: [id number]  Admit Date: 2024  Today's Date: 3/4/2024  CC: dyspnea    Subjective:     Dyspnea due to pulmonary edema. O2 at 100%. NC at 1L. No abd pain. Oriented. Having oral secretions. HOB elevated. TF off. Clear liquid tray untouched at bedside.    Objective:     Blood pressure 126/68, pulse 98, temperature 96.9 °F (36.1 °C), temperature source Axillary, resp. rate 24, height 1.6 m (5' 3\"), weight 73.5 kg (162 lb 0.6 oz), SpO2 100 %.    Physical Exam:  General appearance: ill appearing AAF  Skin: Extremities and face reveal no rashes. No brooks erythema.   HEENT: Sclerae anicteric. DHT in R. Nare.   Cardiovascular: Regular rate and rhythm.  Respiratory: Mouth breathing  GI: Abdomen nondistended, soft, and nontender. Normal active bowel sounds.  +PD catheter  Rectal: Deferred   Musculoskeletal: No pitting edema of the lower legs   Neurological: Alert and oriented.         Data Review:    Recent Results (from the past 24 hour(s))   POCT Glucose    Collection Time: 24 11:32 AM   Result Value Ref Range    POC Glucose 256 (H) 65 - 117 mg/dL    Performed by: BRANDI Nugent    POCT Glucose    Collection Time: 24  4:37 PM   Result Value Ref Range    POC Glucose 261 (H) 65 - 117 mg/dL    Performed by: BRANDI Nugent    POCT Glucose    Collection Time: 24  8:39 PM   Result Value Ref Range    POC Glucose 309 (H) 65 - 117 mg/dL    Performed by: NELA Raygoza    Basic Metabolic Panel    Collection Time: 24  6:38 AM   Result Value Ref Range    Sodium 135 (L) 136 - 145 mmol/L    Potassium 3.8 3.5 - 5.1 mmol/L    Chloride 101 97 - 108 mmol/L    CO2 29 21 - 32 mmol/L    Anion Gap 5 5 - 15 mmol/L    Glucose 230 (H) 65 - 100 mg/dL    BUN 23 (H) 6 - 20 MG/DL    Creatinine 4.69 (H) 0.55 - 1.02 MG/DL    Bun/Cre Ratio 5 (L)  bilateral pleural effusions, increased.  3.  Hepatic steatosis. There is an area of increased density in the left hepatic lobe which may represent focal fatty sparing; however, other masses are not excluded. Consider liver MRI with and without intravenous contrast.    She has had multiple hospital admissions with complex co-morbidities contributing to her nausea and vomiting; altered GI motility, longstanding DM2, metabolic encephalopathy, marked PUD, cholelithiasis,  ESRD on PD, CAD, COPD,  fatty retroperitoneal neoplasm and hepatic steatosis.      Interval history: Oriented. Having difficulty breathing due to pulmonary edema. Tolerating TF. Not really taking any PO.    - TF per nutritionist  - Avoid CNS altering medications  - PPI daily  - Supportive care per hospitalist  - Renal following   - Palliative following  - GI will sign off       Patient Active Hospital Problem List:   Principal Problem:    Vomiting  Active Problems:    Intractable nausea and vomiting    Type 2 diabetes with nephropathy (HCC)    Palliative care encounter    Altered mental status    Advanced care planning/counseling discussion    Hallucinations    Insomnia  Resolved Problems:    * No resolved hospital problems. *      Time Spent with Patient: 20 mins  Severino Giles PA-C  03/04/24  2:59 PM           I have personally reviewed the history and independently examined the patient. I have reviewed the chart and agree with the documentation recorded by the Mid Level Provider, including the assessment, treatment plan, and disposition.    ASSESSMENT AND PLAN:  68 yr old lady had presented with nausea and vomiting which is likely multifactorial as outlined above. She is complaining of dyspnea and wheezing secondary to pulmonary edema.  Advance diet as tolerated  Palliative care consult  Consult cardiology consult for pulmonary edema  We will sign off. Please call if needed.    Jared Griffiths MD

## 2024-03-04 NOTE — DIABETES MGMT
[facility]  DIABETES MANAGEMENT CONSULT    Re-consulted by Provider for advanced nursing evaluation and care for inpatient blood glucose management.    Evaluation and Action Plan   This 68 year old AA female was admitted 2/21/24 with nausea & vomiting. Patient has CKD and is on PD at home; this has continued in-house. Usual home insulin dosing consists of 10 units of total insulin per day. Patient was receiving TF for the past few days, along with corrective insulin, with Bgs rising into the 200-300s. If TF formula continues with Vital 1.5, she will need an additional 9-11 units to override the Bg rise. If the TF is changed to Glucerna 1.2 & runs at rate to attain 1800 calories, will need additional 7-8 units. Now,TF is off.     Strategy is highly dependent on what will continue.    Management Rationale Action Plan   Medication   Basal needs Based on  []        Blood glucose pattern  []        Weight & BMI  []        Kidney dysfunction  [x]        Home diabetes regimen     NPH insulin 5 units twice daily (without TF)    OR NPH insulin 9 units twice daily (if TF restarts)   Nutritional needs Based on  []        Blood glucose pattern  []        CHO load  [x]        Enteral feeding CHO load  []        TPN/PPN dextrose load     Consider switching to diabetes-friendly TF formula   Corrective insulin Based on sensitivity  [x]        Low   []        Medium  []        High      Additional orders        Diabetes Discharge Plan   Medication  TBD     Referral  []        Outpatient diabetes education   Additional orders            Initial Presentation   Galilea Aguilar is a 68 y.o. female admitted 2/21/24 from ED after experiencing vomiting and left side/flank pain - ESRD-PD dependant. Recent admission 1/25-2/5 for abd pain/ sepsis secondary to peritonitis and was d/c'd with abx. Since discharged persistent N/V.   CT: Large right retroperitoneal mass though has been present on several CT scans since

## 2024-03-04 NOTE — FLOWSHEET NOTE
PD DISCONNECT   03/04/24 1100   Vitals   /76   Pulse 73   Respirations 24   Observations & Evaluations   Level of Consciousness 1   Heart Rhythm Regular   O2 Device Nasal cannula  (set at 1 lpm)   Bilateral Breath Sounds Rhonchi   Skin Condition/Temp Warm;Dry   Edema Left upper extremity   LUE Edema Trace   Peritoneal Dialysis Catheter Mid lower abdomen   No placement date or time found.   Catheter Location: Mid lower abdomen   Status Deaccessed   Dressing Status Clean, dry & intact   Dressing Gauze   Catheter Care Given Yes  (one minute alcavis scrub followed by one minute soak and sterile minicap)   Peritoneal Dialysis (CAPD manual)   Peritoneal Dialysis Volume In (ml) 2000 ml   Effluent Volume Out (mL) 2700 ml   Balance This Exchange (mL) 700 ml     Primary RN SBAR:  GIL Tarango RN   Comments: orders, labs, consents and code status reviewed.  After two hour dwell drain of 700 ml.  Nephrology notified.  SBAR with primary nurse on departure bed in low position side rails up x 3, call bell within reach.

## 2024-03-04 NOTE — PLAN OF CARE
Problem: Occupational Therapy - Adult  Goal: By Discharge: Performs self-care activities at highest level of function for planned discharge setting.  See evaluation for individualized goals.  Description: FUNCTIONAL STATUS PRIOR TO ADMISSION:  Patient lives with her daughter who assists with bathing and lower body ADLs PRN, Mod I with RW for functional mobility, receiving  services.    Occupational Therapy Goals:  Initiated 2/23/2024: WEEKLY REASSESSMENT 03/04/2024  1.  Patient will perform grooming at the sink with Supervision within 7 day(s). (Cont. 03/04)  2.  Patient will perform bathing with Minimal Assist within 7 day(s). (Cont. 03/04)  3.  Patient will perform upper and lower body dressing with Supervision & AE PRN within 7 day(s). (Cont. 03/04)  4.  Patient will perform toilet transfers with Supervision  within 7 day(s). (Cont. 03/04)  5.  Patient will perform all aspects of toileting with Supervision within 7 day(s). (Cont. 03/04)  6.  Patient will participate in upper extremity therapeutic exercise/activities with Supervision for 5 minutes within 7 day(s). (Cont. 03/04)     7.  Patient will utilize energy conservation techniques during functional activities with verbal and visual cues within 7 day(s). (Cont. 03/04)    Occupational Therapy Goals:  Initiated 2/23/2024  1.  Patient will perform grooming at the sink with Supervision within 7 day(s).  2.  Patient will perform bathing with Minimal Assist within 7 day(s).  3.  Patient will perform upper and lower body dressing with Supervision & AE PRN within 7 day(s).  4.  Patient will perform toilet transfers with Supervision  within 7 day(s).  5.  Patient will perform all aspects of toileting with Supervision within 7 day(s).  6.  Patient will participate in upper extremity therapeutic exercise/activities with Supervision for 5 minutes within 7 day(s).    7.  Patient will utilize energy conservation techniques during functional activities with verbal and  facility    Other factors to consider for discharge: available support system works or is unable to provide adequate supervision and the patient would be alone, patient's current support system is unable to meet their requirements for physical assistance, poor safety awareness, impaired cognition, high risk for falls, not safe to be alone, and concern for safely navigating or managing the home environment    IF patient discharges home will need the following DME: continuing to assess with progress     SUBJECTIVE:   Patient stated “Where am I again?”    OBJECTIVE DATA SUMMARY:   Cognitive/Behavioral Status:  Orientation  Overall Orientation Status: Impaired  Orientation Level: Oriented to person;Disoriented to place;Disoriented to time;Disoriented to situation  Cognition  Overall Cognitive Status: Exceptions  Arousal/Alertness: Appropriate responses to stimuli  Following Commands: Follows one step commands with increased time;Follows one step commands with repetition  Attention Span: Attends with cues to redirect;Difficulty attending to directions  Memory: Decreased short term memory  Safety Judgement: Decreased awareness of need for assistance  Problem Solving: Decreased awareness of errors  Insights: Decreased awareness of deficits  Initiation: Requires cues for some  Sequencing: Requires cues for some    Functional Mobility and Transfers for ADLs:  Bed Mobility:  Bed Mobility Training  Bed Mobility Training: Yes  Rolling: Moderate assistance  Supine to Sit: Moderate assistance  Sit to Supine: Moderate assistance  Scooting: Moderate assistance     Transfers:   Transfer Training  Transfer Training: Yes  Overall Level of Assistance: Contact-guard assistance;Adaptive equipment  Interventions: Manual cues;Safety awareness training;Tactile cues;Verbal cues  Sit to Stand: Moderate assistance;Minimum assistance;Assist X2  Stand to Sit: Moderate assistance;Minimum assistance;Assist X2  Stand Pivot Transfers: Minimum

## 2024-03-04 NOTE — PROGRESS NOTES
Hospitalist Progress Note  Jean-Claude Castaneda MD  Answering service: [phone] OR [phone] from in house phone        Date of Service:  3/4/2024  NAME:  Galilea Aguilar  :  1955  MRN:  [id number]      Admission Summary:     HPI: \"Galilea Aguilar is a 68 y.o. female with a history of CAD, COPD, CVA, diabetes, ESRD on peritoneal dialysis who presented to the ED with chief complaint of vomiting and left side/flank pain. In the ED, labs consistent with known renal dysfunction. QTc was found to be prolonged (554). CT demonstrated a large right retroperitoneal mass though has been present on several CT scans since . Patient treated with IV Valium for nausea and referred to hospitalists for further management.      Patient was seen and examined this morning in the ED, she was lying in bed in no acute distress -though did notice her having \"dry heaves\". Patient was recently admitted from  - 2024 for generalized abdominal pain, diagnosed with severe sepsis secondary to peritonitis and was discharged on vancomycin to be given through her peritoneal dialysis fluid and has also been prescribed Diflucan daily for the next month while on antibiotics.  She reports that since she has been discharged, she has had nausea and vomiting that has been persistent and has had difficulty keeping anything down.  Denies any headaches or dizziness, no chest pain or pressure, no shortness of breath or coughing.  Reports no diarrhea or constipation at home and has no abdominal pain.       Medication reconciliation completed with patient at bedside.      Addendum 1540:   - Tylenol not sufficient  for left sided flank discomfort  - give low dose morphine as she is having difficulty tolerating po. Was given 4 mg last night.  - add compazine as alternate nausea mediction\"       Interval history / Subjective:     Patient seen examined at  injection vial 0-4 Units  0-4 Units SubCUTAneous TID WC    insulin lispro (HUMALOG) injection vial 0-4 Units  0-4 Units SubCUTAneous Nightly    gentamicin (GARAMYCIN) 0.1 % cream   Topical PRN    prochlorperazine (COMPAZINE) injection 5 mg  5 mg IntraVENous Q6H PRN     ______________________________________________________________________  EXPECTED LENGTH OF STAY: Unable to retrieve estimated LOS  ACTUAL LENGTH OF STAY:          11                 Jean-Claude Castaneda MD

## 2024-03-04 NOTE — FLOWSHEET NOTE
PD DISCONNECTION   03/04/24 0600   Vitals   /74   Temp 98 °F (36.7 °C)   Temp Source Axillary   Pulse 80   Respirations 24   SpO2 100 %   Observations & Evaluations   Level of Consciousness 0   Heart Rhythm Regular  (remote telemetry)   Respiratory Quality/Effort Accessory muscle use  (abdominal accesory muscle use)   O2 Device Nasal cannula   Bilateral Breath Sounds Rhonchi   Skin Condition/Temp Warm;Dry   Abdomen Inspection Soft;Rounded   Edema Left upper extremity   LUE Edema Trace   Peritoneal Dialysis Catheter Mid lower abdomen   No placement date or time found.   Catheter Location: Mid lower abdomen   Status Deaccessed   Dressing Status Clean, dry & intact   Dressing Gauze   Date of Last Dressing Change 03/03/24   Dialysis Type Continuous cycling   Catheter Care Given Yes  (one minute alcavis scrub followed by one minute soak)   Post-Treatment (Cycler)   Average Dwell Time (Hours:Minutes) 1:20   Lost Dwell Time (Hours:Minutes) 0:08   Effluent Appearance Clear;Yellow  (visualized in toilet)   I Drain (mL) 264 mL   Volume Gain (mL) 35 mL  ( - POSITIVE uf  of 299 VOLUME GAIN 35)     Time out:  0555  SBAR:  BRONWYN Camejo RN   Alarm:  Low UF   Comments: on presentation patient in drain 5/5 with a low UF alarm, repositioned x 2 and continued to drain for a positive volume gain of 35.  Patient with oxygen at 2 liters nasal cannula, rhonchi throughout, abdominal accessory muscle use 100% saturation, nurse at bedside, chest xray has been ordered, bed in low position, side rails up x 3, call bell within reach, discussed with nephrology  Dr. Queen, stat 4.25% 2 liter manual exchange to dwell for two hours.

## 2024-03-04 NOTE — PROGRESS NOTES
Note:    Patient respiratory status declined. MD made aware and order were given. Patient transferred to higher level care. Daughter was notified.

## 2024-03-04 NOTE — PLAN OF CARE
Problem: Physical Therapy - Adult  Goal: By Discharge: Performs mobility at highest level of function for planned discharge setting.  See evaluation for individualized goals.  Description: FUNCTIONAL STATUS PRIOR TO ADMISSION: Patient was modified independent using a rolling walker for functional mobility, wheelchair for longer distances. Pt reported a fall a few days prior to this admission    HOME SUPPORT PRIOR TO ADMISSION: The patient lived with family and required assist for some ADLs.    Physical Therapy Goals  Initiated 2/23/2024, reassessed and remain appropriate 03/04/24   1.  Patient will move from supine to sit and sit to supine and roll side to side in bed with modified independence within 7 day(s).    2.  Patient will perform sit to stand with modified independence within 7 day(s).  3.  Patient will transfer from bed to chair and chair to bed with modified independence using the least restrictive device within 7 day(s).  4.  Patient will ambulate with supervision/set-up for 50 feet with the least restrictive device within 7 day(s).   5.  Patient will ascend/descend 4 stairs with single handrail(s) with supervision/set-up within 7 day(s).    Outcome: Progressing     PHYSICAL THERAPY TREATMENT: WEEKLY REASSESSMENT    Patient: Galilea Aguilar (68 y.o. female)  Date: 3/4/2024  Primary Diagnosis: Hypomagnesemia [E83.42]  Vomiting [R11.10]  Prolonged Q-T interval on ECG [R94.31]  Intractable nausea and vomiting [R11.2]       Precautions: Contact Precautions, Fall Risk                      ASSESSMENT :  Patient continues to benefit from skilled PT services and is slowly progressing towards goals. Pt tolerates supine to sit, transfers to chair and then BSC with increased cues, mod A and repeated orientation. Pt noted to have decreased dynamic balance and increased forward flexion throughout. Pt dependent for hygiene and poor planning for movement. Pt left in bed with call bell. Will continue to

## 2024-03-04 NOTE — PROGRESS NOTES
Standing scale   9/7/2023 173 lbs 5 oz  172 lbs 6 oz 78.6 kg  78.2 kg Standing scale   9/6/2023 173 lbs 5 oz 78.6 kg Standing scale   9/5/2023 175 lbs 1 oz 79.4 kg Standing scale   9/4/2023 170 lbs 10 oz 77.4 kg Standing scale   9/3/2023 173 lbs 1 oz 78.5 kg Standing scale   9/2/2023 176 lbs 2 oz 79.9 kg Standing scale   9/1/2023 175 lbs 8 oz 79.6 kg Standing scale   8/31/2023 175 lbs 1 oz  175 lbs 1 oz 79.4 kg  79.4 kg Standing scale   8/30/2023 179 lbs 4 oz 81.3 kg Standing scale       07/24/23 89.2 kg (196 lb 10.4 oz)   07/18/23 85.3 kg (188 lb)   11/28/22 87.5 kg (193 lb)   06/17/22 91.8 kg (202 lb 4.8 oz)   05/20/22 95.7 kg (211 lb)   05/18/22 97.1 kg (214 lb)   05/17/22 98.2 kg (216 lb 9.6 oz)       Nutrition Diagnosis:   Inadequate oral intake related to altered GI function as evidenced by weight loss, poor intake prior to admission, nutrition support - enteral nutrition  Increased nutrient needs related to increase demand for energy/nutrients as evidenced by dialysis    Nutrition Interventions:   Food and/or Nutrient Delivery: Continue Current Diet, Continue Current Tube Feeding  Nutrition Education/Counseling: Counseling initiated  Coordination of Nutrition Care: Continue to monitor while inpatient       Goals:  Previous Goal Met: Progressing toward Goal(s)  Goals: Tolerate nutrition support at goal rate, by next RD assessment       Nutrition Monitoring and Evaluation:   Behavioral-Environmental Outcomes: None Identified  Food/Nutrient Intake Outcomes: Diet Advancement/Tolerance, Food and Nutrient Intake, Enteral Nutrition Intake/Tolerance, Vitamin/Mineral Intake  Physical Signs/Symptoms Outcomes: Biochemical Data, GI Status, Nausea or Vomiting, Nutrition Focused Physical Findings, Weight    Discharge Planning:    Too soon to determine     Recent Labs     03/02/24  0533 03/03/24  0520 03/04/24  0638   GLUCOSE 198* 195* 230*   BUN 21* 20 23*   CREATININE 4.75* 4.66* 4.69*   * 134* 135*   K 2.7* 3.7 3.8    CL 99 102 101   CO2 29 15* 29   CALCIUM 7.2* 6.5* 7.0*   PHOS 3.5 2.2* 1.8*   MG 1.9 1.6 1.8         Recent Labs     03/01/24  1713 03/01/24  2046 03/02/24  0021 03/02/24  0611 03/02/24  1140 03/02/24  1703 03/02/24  2240 03/03/24  0726 03/03/24  1132 03/03/24  1637 03/03/24  2039 03/04/24  0850 03/04/24  1121   POCGLU 97 169* 180* 221* 239* 221* 253* 292* 256* 261* 309* 299* 305*       Lab Results   Component Value Date/Time    LABA1C 5.5 03/01/2024 05:28 AM    LABA1C 5.9 12/14/2023 06:22 AM    LABA1C 6.6 11/02/2023 04:21 AM     03/01/2024 05:28 AM       Ernestina Lu RD  Available via [employer]

## 2024-03-05 LAB
ALBUMIN SERPL-MCNC: 1.5 G/DL (ref 3.5–5)
ANION GAP SERPL CALC-SCNC: 7 MMOL/L (ref 5–15)
ANION GAP SERPL CALC-SCNC: 8 MMOL/L (ref 5–15)
BASOPHILS # BLD: 0 K/UL (ref 0–0.1)
BASOPHILS NFR BLD: 0 % (ref 0–1)
BUN SERPL-MCNC: 25 MG/DL (ref 6–20)
BUN SERPL-MCNC: 26 MG/DL (ref 6–20)
BUN/CREAT SERPL: 5 (ref 12–20)
BUN/CREAT SERPL: 5 (ref 12–20)
CALCIUM SERPL-MCNC: 7.1 MG/DL (ref 8.5–10.1)
CALCIUM SERPL-MCNC: 7.4 MG/DL (ref 8.5–10.1)
CHLORIDE SERPL-SCNC: 101 MMOL/L (ref 97–108)
CHLORIDE SERPL-SCNC: 102 MMOL/L (ref 97–108)
CO2 SERPL-SCNC: 28 MMOL/L (ref 21–32)
CO2 SERPL-SCNC: 28 MMOL/L (ref 21–32)
COMMENT:: NORMAL
CREAT SERPL-MCNC: 4.7 MG/DL (ref 0.55–1.02)
CREAT SERPL-MCNC: 4.88 MG/DL (ref 0.55–1.02)
DIFFERENTIAL METHOD BLD: ABNORMAL
EOSINOPHIL # BLD: 0.2 K/UL (ref 0–0.4)
EOSINOPHIL NFR BLD: 1 % (ref 0–7)
ERYTHROCYTE [DISTWIDTH] IN BLOOD BY AUTOMATED COUNT: 18.6 % (ref 11.5–14.5)
ERYTHROCYTE [DISTWIDTH] IN BLOOD BY AUTOMATED COUNT: 20.2 % (ref 11.5–14.5)
FERRITIN SERPL-MCNC: 1703 NG/ML (ref 8–252)
GLUCOSE BLD STRIP.AUTO-MCNC: 212 MG/DL (ref 65–117)
GLUCOSE BLD STRIP.AUTO-MCNC: 222 MG/DL (ref 65–117)
GLUCOSE BLD STRIP.AUTO-MCNC: 252 MG/DL (ref 65–117)
GLUCOSE BLD STRIP.AUTO-MCNC: 277 MG/DL (ref 65–117)
GLUCOSE SERPL-MCNC: 147 MG/DL (ref 65–100)
GLUCOSE SERPL-MCNC: 277 MG/DL (ref 65–100)
HCT VFR BLD AUTO: 22.1 % (ref 35–47)
HCT VFR BLD AUTO: 27.8 % (ref 35–47)
HGB BLD-MCNC: 6.8 G/DL (ref 11.5–16)
HGB BLD-MCNC: 8.5 G/DL (ref 11.5–16)
HISTORY CHECK: NORMAL
IMM GRANULOCYTES # BLD AUTO: 0.2 K/UL (ref 0–0.04)
IMM GRANULOCYTES NFR BLD AUTO: 1 % (ref 0–0.5)
IRON SATN MFR SERPL: 27 % (ref 20–50)
IRON SERPL-MCNC: 25 UG/DL (ref 35–150)
LYMPHOCYTES # BLD: 1.2 K/UL (ref 0.8–3.5)
LYMPHOCYTES NFR BLD: 6 % (ref 12–49)
MAGNESIUM SERPL-MCNC: 1.8 MG/DL (ref 1.6–2.4)
MCH RBC QN AUTO: 28.5 PG (ref 26–34)
MCH RBC QN AUTO: 28.7 PG (ref 26–34)
MCHC RBC AUTO-ENTMCNC: 30.6 G/DL (ref 30–36.5)
MCHC RBC AUTO-ENTMCNC: 30.8 G/DL (ref 30–36.5)
MCV RBC AUTO: 92.5 FL (ref 80–99)
MCV RBC AUTO: 93.9 FL (ref 80–99)
MONOCYTES # BLD: 1 K/UL (ref 0–1)
MONOCYTES NFR BLD: 5 % (ref 5–13)
NEUTS SEG # BLD: 17.1 K/UL (ref 1.8–8)
NEUTS SEG NFR BLD: 87 % (ref 32–75)
NRBC # BLD: 0.02 K/UL (ref 0–0.01)
NRBC # BLD: 0.03 K/UL (ref 0–0.01)
NRBC BLD-RTO: 0.1 PER 100 WBC
NRBC BLD-RTO: 0.2 PER 100 WBC
PHOSPHATE SERPL-MCNC: 4 MG/DL (ref 2.6–4.7)
PHOSPHATE SERPL-MCNC: 4.5 MG/DL (ref 2.6–4.7)
PLATELET # BLD AUTO: 169 K/UL (ref 150–400)
PLATELET # BLD AUTO: 177 K/UL (ref 150–400)
PMV BLD AUTO: 10.4 FL (ref 8.9–12.9)
PMV BLD AUTO: 10.5 FL (ref 8.9–12.9)
POTASSIUM SERPL-SCNC: 4.2 MMOL/L (ref 3.5–5.1)
POTASSIUM SERPL-SCNC: 4.6 MMOL/L (ref 3.5–5.1)
PROCALCITONIN SERPL-MCNC: 9.16 NG/ML
RBC # BLD AUTO: 2.39 M/UL (ref 3.8–5.2)
RBC # BLD AUTO: 2.96 M/UL (ref 3.8–5.2)
RBC MORPH BLD: ABNORMAL
SERVICE CMNT-IMP: ABNORMAL
SODIUM SERPL-SCNC: 136 MMOL/L (ref 136–145)
SODIUM SERPL-SCNC: 138 MMOL/L (ref 136–145)
SPECIMEN HOLD: NORMAL
TIBC SERPL-MCNC: 94 UG/DL (ref 250–450)
VIT B12 SERPL-MCNC: 913 PG/ML (ref 193–986)
WBC # BLD AUTO: 17.3 K/UL (ref 3.6–11)
WBC # BLD AUTO: 19.7 K/UL (ref 3.6–11)

## 2024-03-05 PROCEDURE — 86900 BLOOD TYPING SEROLOGIC ABO: CPT

## 2024-03-05 PROCEDURE — 94640 AIRWAY INHALATION TREATMENT: CPT

## 2024-03-05 PROCEDURE — 6360000002 HC RX W HCPCS: Performed by: NURSE PRACTITIONER

## 2024-03-05 PROCEDURE — 6370000000 HC RX 637 (ALT 250 FOR IP): Performed by: INTERNAL MEDICINE

## 2024-03-05 PROCEDURE — 97530 THERAPEUTIC ACTIVITIES: CPT

## 2024-03-05 PROCEDURE — 82728 ASSAY OF FERRITIN: CPT

## 2024-03-05 PROCEDURE — 6370000000 HC RX 637 (ALT 250 FOR IP): Performed by: STUDENT IN AN ORGANIZED HEALTH CARE EDUCATION/TRAINING PROGRAM

## 2024-03-05 PROCEDURE — 84145 PROCALCITONIN (PCT): CPT

## 2024-03-05 PROCEDURE — A4216 STERILE WATER/SALINE, 10 ML: HCPCS | Performed by: NURSE PRACTITIONER

## 2024-03-05 PROCEDURE — 2580000003 HC RX 258: Performed by: PHYSICIAN ASSISTANT

## 2024-03-05 PROCEDURE — 90945 DIALYSIS ONE EVALUATION: CPT

## 2024-03-05 PROCEDURE — 6360000002 HC RX W HCPCS: Performed by: PHYSICIAN ASSISTANT

## 2024-03-05 PROCEDURE — 6370000000 HC RX 637 (ALT 250 FOR IP): Performed by: NURSE PRACTITIONER

## 2024-03-05 PROCEDURE — 82746 ASSAY OF FOLIC ACID SERUM: CPT

## 2024-03-05 PROCEDURE — 6370000000 HC RX 637 (ALT 250 FOR IP): Performed by: CLINICAL NURSE SPECIALIST

## 2024-03-05 PROCEDURE — 83550 IRON BINDING TEST: CPT

## 2024-03-05 PROCEDURE — 2580000003 HC RX 258: Performed by: NURSE PRACTITIONER

## 2024-03-05 PROCEDURE — 30233N1 TRANSFUSION OF NONAUTOLOGOUS RED BLOOD CELLS INTO PERIPHERAL VEIN, PERCUTANEOUS APPROACH: ICD-10-PCS | Performed by: HOSPITALIST

## 2024-03-05 PROCEDURE — 86850 RBC ANTIBODY SCREEN: CPT

## 2024-03-05 PROCEDURE — 36430 TRANSFUSION BLD/BLD COMPNT: CPT

## 2024-03-05 PROCEDURE — 2700000000 HC OXYGEN THERAPY PER DAY

## 2024-03-05 PROCEDURE — 85025 COMPLETE CBC W/AUTO DIFF WBC: CPT

## 2024-03-05 PROCEDURE — 84100 ASSAY OF PHOSPHORUS: CPT

## 2024-03-05 PROCEDURE — 2060000000 HC ICU INTERMEDIATE R&B

## 2024-03-05 PROCEDURE — C9113 INJ PANTOPRAZOLE SODIUM, VIA: HCPCS | Performed by: NURSE PRACTITIONER

## 2024-03-05 PROCEDURE — 36415 COLL VENOUS BLD VENIPUNCTURE: CPT

## 2024-03-05 PROCEDURE — 82607 VITAMIN B-12: CPT

## 2024-03-05 PROCEDURE — 94760 N-INVAS EAR/PLS OXIMETRY 1: CPT

## 2024-03-05 PROCEDURE — 99231 SBSQ HOSP IP/OBS SF/LOW 25: CPT | Performed by: CLINICAL NURSE SPECIALIST

## 2024-03-05 PROCEDURE — 85027 COMPLETE CBC AUTOMATED: CPT

## 2024-03-05 PROCEDURE — 86923 COMPATIBILITY TEST ELECTRIC: CPT

## 2024-03-05 PROCEDURE — 86901 BLOOD TYPING SEROLOGIC RH(D): CPT

## 2024-03-05 PROCEDURE — P9016 RBC LEUKOCYTES REDUCED: HCPCS

## 2024-03-05 PROCEDURE — 83735 ASSAY OF MAGNESIUM: CPT

## 2024-03-05 PROCEDURE — 99232 SBSQ HOSP IP/OBS MODERATE 35: CPT | Performed by: NURSE PRACTITIONER

## 2024-03-05 PROCEDURE — 80069 RENAL FUNCTION PANEL: CPT

## 2024-03-05 PROCEDURE — 82962 GLUCOSE BLOOD TEST: CPT

## 2024-03-05 PROCEDURE — 80048 BASIC METABOLIC PNL TOTAL CA: CPT

## 2024-03-05 PROCEDURE — 83540 ASSAY OF IRON: CPT

## 2024-03-05 RX ORDER — SODIUM CHLORIDE, SODIUM LACTATE, CALCIUM CHLORIDE, MAGNESIUM CHLORIDE AND DEXTROSE 2.5; 538; 448; 18.3; 5.08 G/100ML; MG/100ML; MG/100ML; MG/100ML; MG/100ML
6000 INJECTION, SOLUTION INTRAPERITONEAL DAILY
Status: DISCONTINUED | OUTPATIENT
Start: 2024-03-05 | End: 2024-03-08

## 2024-03-05 RX ORDER — SODIUM CHLORIDE 9 MG/ML
INJECTION, SOLUTION INTRAVENOUS PRN
Status: DISCONTINUED | OUTPATIENT
Start: 2024-03-05 | End: 2024-03-16 | Stop reason: HOSPADM

## 2024-03-05 RX ADMIN — DULOXETINE HYDROCHLORIDE 20 MG: 20 CAPSULE, DELAYED RELEASE ORAL at 10:24

## 2024-03-05 RX ADMIN — Medication 10 ML: at 21:51

## 2024-03-05 RX ADMIN — ARFORMOTEROL TARTRATE: 15 SOLUTION RESPIRATORY (INHALATION) at 20:23

## 2024-03-05 RX ADMIN — ROSUVASTATIN CALCIUM 10 MG: 10 TABLET, FILM COATED ORAL at 21:51

## 2024-03-05 RX ADMIN — Medication 5000 UNITS: at 10:24

## 2024-03-05 RX ADMIN — ASPIRIN 81 MG CHEWABLE TABLET 81 MG: 81 TABLET CHEWABLE at 10:24

## 2024-03-05 RX ADMIN — INSULIN HUMAN 9 UNITS: 100 INJECTION, SUSPENSION SUBCUTANEOUS at 21:51

## 2024-03-05 RX ADMIN — Medication 3 MG: at 21:51

## 2024-03-05 RX ADMIN — WATER 60 MG: 1 INJECTION INTRAMUSCULAR; INTRAVENOUS; SUBCUTANEOUS at 12:05

## 2024-03-05 RX ADMIN — Medication 100 MG: at 10:24

## 2024-03-05 RX ADMIN — INSULIN LISPRO 1 UNITS: 100 INJECTION, SOLUTION INTRAVENOUS; SUBCUTANEOUS at 12:05

## 2024-03-05 RX ADMIN — DULOXETINE HYDROCHLORIDE 20 MG: 20 CAPSULE, DELAYED RELEASE ORAL at 21:51

## 2024-03-05 RX ADMIN — SODIUM CHLORIDE, SODIUM LACTATE, CALCIUM CHLORIDE, MAGNESIUM CHLORIDE AND DEXTROSE 6000 ML: 2.5; 538; 448; 18.3; 5.08 INJECTION, SOLUTION INTRAPERITONEAL at 16:08

## 2024-03-05 RX ADMIN — PANTOPRAZOLE SODIUM 40 MG: 40 INJECTION, POWDER, FOR SOLUTION INTRAVENOUS at 10:24

## 2024-03-05 RX ADMIN — ARFORMOTEROL TARTRATE: 15 SOLUTION RESPIRATORY (INHALATION) at 08:28

## 2024-03-05 RX ADMIN — INSULIN LISPRO 1 UNITS: 100 INJECTION, SOLUTION INTRAVENOUS; SUBCUTANEOUS at 18:16

## 2024-03-05 RX ADMIN — INSULIN HUMAN 9 UNITS: 100 INJECTION, SUSPENSION SUBCUTANEOUS at 10:23

## 2024-03-05 RX ADMIN — INSULIN LISPRO 2 UNITS: 100 INJECTION, SOLUTION INTRAVENOUS; SUBCUTANEOUS at 10:23

## 2024-03-05 RX ADMIN — CARVEDILOL 6.25 MG: 6.25 TABLET, FILM COATED ORAL at 18:16

## 2024-03-05 RX ADMIN — SODIUM BICARBONATE 650 MG: 650 TABLET ORAL at 21:51

## 2024-03-05 RX ADMIN — SODIUM BICARBONATE 650 MG: 650 TABLET ORAL at 10:24

## 2024-03-05 RX ADMIN — MONTELUKAST 10 MG: 10 TABLET, FILM COATED ORAL at 21:51

## 2024-03-05 RX ADMIN — GENTAMICIN SULFATE: 1 CREAM TOPICAL at 16:09

## 2024-03-05 ASSESSMENT — PAIN DESCRIPTION - ORIENTATION: ORIENTATION: LEFT;POSTERIOR;UPPER

## 2024-03-05 ASSESSMENT — PAIN SCALES - GENERAL
PAINLEVEL_OUTOF10: 9
PAINLEVEL_OUTOF10: 10

## 2024-03-05 ASSESSMENT — PAIN DESCRIPTION - LOCATION: LOCATION: FLANK;BACK

## 2024-03-05 NOTE — PROGRESS NOTES
[facility]      Nephrology  Note  Galilea Aguilar  Date of Admission : 2/21/2024    CC:  Follow up for ESRD       Assessment and Plan     ESRD on PD   - on CCPD 2.5% (2) 1.5% solution with 4.5% midday yesterday.  - plan to switch to all 2.5% this afternoon will update [facility]  -  will monitor need for mid-day exchange  - Daily labs    Hypo-K, mag, Phos,ca  -2/2 poor nutrition  -replete K prn   - phos stable post IV replacement, replete PRN  -Not on Phos binders     Recent PD peritonitis  - cx positive for staph epi  -Completed IV antibiotics  -No further IP antibiotics planned    Severe hypoalbuminemia  -2/2 poor nutrition  - TF currently on hold-per primary team    Encephalopathy:  -?  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 Renal cysts  -S/p post pylori feeding tube     Anemia of CKD  -Continue LIS  - recommend transfusion for Hgb <7  - recheck iron stores (1/2024 stable levels)    Prolonged QT interval   HTN   DM  COPD  CAD         Interval History:  Patient seen and examined this AM. Events noted overnight. More alert answering questions appropriately. Good UF with extra exchange and changing dialysate.  Plans for RBC transfusion today.    Current Medications: all current  Medications have been eviewed in EPIC  Review of Systems: A comprehensive review of systems was negative.    Objective:  Vitals:    Vitals:    03/05/24 0557 03/05/24 0630 03/05/24 0715 03/05/24 0831   BP:  133/61 (!) 108/53 (!) 111/58   Pulse: 75 81  80   Resp:  20 26 20   Temp:  96.9 °F (36.1 °C) 97.6 °F (36.4 °C)    TempSrc:   Oral    SpO2:  100%  100%   Weight:       Height:         Intake and Output:  No intake/output data recorded.  03/03 1901 - 03/05 0700  In: 2916.3   Out: 3482     Physical Examination:  General: Alert and conversant this AM  HEENT NG in placed  TF on hold  Resp:  Diminished posteriorly, no wheezing, on NC  CV:  Regular Rate/ Rhythm,  no  LE edema  GI:  Soft, NT, PD exit site dressing D/I.  Neurologic:  Oriented person/ place      LABS:  Recent Labs     03/05/24  0050 03/04/24  0638 03/03/24  0520 03/02/24  0533 03/01/24  0528 02/29/24  0751    135* 134*   < > 133* 135*   K 4.6 3.8 3.7   < > 2.9* 3.5    101 102   < > 99 97   CO2 28 29 15*   < > 28 26   BUN 25* 23* 20   < > 22* 21*   CREATININE 4.70* 4.69* 4.66*   < > 4.67* 4.94*   CALCIUM 7.1* 7.0* 6.5*   < > 7.8* 8.0*   LABALBU  --  1.3*  --   --  1.7* 1.7*   PHOS 4.5 1.8* 2.2*   < > 3.7 4.2   MG 1.8 1.8 1.6   < > 1.8 1.8    < > = values in this interval not displayed.       Recent Labs     03/05/24  0050 02/28/24  0733 02/27/24  0645   WBC 19.7* 3.4* 7.1   HGB 6.8* 7.5* 7.9*   HCT 22.1* 23.5* 24.2*    129* 141*       No results for input(s): \"WINSTON\", \"KU\", \"CLU\", \"CREAU\" in the last 720 hours.    Invalid input(s): \"PROU\"  No results found for: \"SDES\"  No components found for: \"CULT\"      Review of Medical Records: I have reviewed all pertinent labs, chart notes, microbiology data, radiology imaging this patient.  Medications list personally reviewed.  No change in PMH ,family and social history from Consult note.      DAYA Cao - NP  [facility] [address], [place] [address]  Phone: [phone]   [phone]

## 2024-03-05 NOTE — PROGRESS NOTES
Patient is experiencing both auditory and visual hallucinations, she is seeing a man in the room taking the \"baby and kill them\" MD aware.    Per palliative  (Alivia ) medications have been altered, due to patient having hallucinations.    Leonel Grant notified he said he will consult psych and neurology.

## 2024-03-05 NOTE — PLAN OF CARE
Problem: Occupational Therapy - Adult  Goal: By Discharge: Performs self-care activities at highest level of function for planned discharge setting.  See evaluation for individualized goals.  Description: FUNCTIONAL STATUS PRIOR TO ADMISSION:  Patient lives with her daughter who assists with bathing and lower body ADLs PRN, Mod I with RW for functional mobility, receiving  services.    Occupational Therapy Goals:  Initiated 2/23/2024: WEEKLY REASSESSMENT 03/04/2024  1.  Patient will perform grooming at the sink with Supervision within 7 day(s). (Cont. 03/04)  2.  Patient will perform bathing with Minimal Assist within 7 day(s). (Cont. 03/04)  3.  Patient will perform upper and lower body dressing with Supervision & AE PRN within 7 day(s). (Cont. 03/04)  4.  Patient will perform toilet transfers with Supervision  within 7 day(s). (Cont. 03/04)  5.  Patient will perform all aspects of toileting with Supervision within 7 day(s). (Cont. 03/04)  6.  Patient will participate in upper extremity therapeutic exercise/activities with Supervision for 5 minutes within 7 day(s). (Cont. 03/04)     7.  Patient will utilize energy conservation techniques during functional activities with verbal and visual cues within 7 day(s). (Cont. 03/04)    Occupational Therapy Goals:  Initiated 2/23/2024  1.  Patient will perform grooming at the sink with Supervision within 7 day(s).  2.  Patient will perform bathing with Minimal Assist within 7 day(s).  3.  Patient will perform upper and lower body dressing with Supervision & AE PRN within 7 day(s).  4.  Patient will perform toilet transfers with Supervision  within 7 day(s).  5.  Patient will perform all aspects of toileting with Supervision within 7 day(s).  6.  Patient will participate in upper extremity therapeutic exercise/activities with Supervision for 5 minutes within 7 day(s).    7.  Patient will utilize energy conservation techniques during functional activities with verbal and

## 2024-03-05 NOTE — DIABETES MGMT
[facility]  DIABETES MANAGEMENT CONSULT    Re-consulted by Provider for advanced nursing evaluation and care for inpatient blood glucose management.    Evaluation and Action Plan   This 68 year old AA female was admitted 2/21/24 with nausea & vomiting. Patient has CKD and is on PD at home; this has continued in-house. Usual home insulin dosing consists of 10 units of total insulin per day. Patient was receiving TF for the past few days, along with corrective insulin, with Bgs rising into the 200-300s. TF was stopped yesterday in order to address pulmonary edema. PD useful in pulling extra fluid. BPs lowish. TF will not be restarted at this time. Hence Bg pattern reflects basal needs.    Management Rationale Action Plan   Medication   Basal needs Based on  []        Blood glucose pattern  []        Weight & BMI  []        Kidney dysfunction  [x]        Home diabetes regimen     Increase NPH insulin 9 units twice daily    Nutritional needs Based on  []        Blood glucose pattern  []        CHO load  [x]        Enteral feeding CHO load  []        TPN/PPN dextrose load     Not until TF restarts or other oral intake   Corrective insulin Based on sensitivity  [x]        Low   []        Medium  []        High      Additional orders        Diabetes Discharge Plan   Medication  TBD     Referral  []        Outpatient diabetes education   Additional orders            Initial Presentation   Galilea Aguilar is a 68 y.o. female admitted 2/21/24 from ED after experiencing vomiting and left side/flank pain - ESRD-PD dependant. Recent admission 1/25-2/5 for abd pain/ sepsis secondary to peritonitis and was d/c'd with abx. Since discharged persistent N/V.   CT: Large right retroperitoneal mass though has been present on several CT scans since 2021      HX:  Past Medical History:   Diagnosis Date    Asthma     CAD (coronary artery disease)     COPD (chronic obstructive pulmonary disease) (HCC)     PCP  administration  [x] Affordable  Reducing risks  [x] Pneumonia: 05/01/2014       Overall evaluation:    [] A1cs inaccurate due to Epogen & dialysis    Subjective   NA     Objective   Physical exam  General Overweight female who is in no acute distress  Neuro  Alert, oriented   Vital Signs   Vitals:    03/05/24 0947   BP: (!) 92/51   Pulse:    Resp:    Temp: 98.6 °F (37 °C)   SpO2:      Laboratory  Recent Labs     03/05/24  0050   WBC 19.7*   HGB 6.8*   HCT 22.1*   MCV 92.5        Recent Labs     03/03/24  0520 03/04/24  0638 03/05/24  0050   * 135* 136   K 3.7 3.8 4.6    101 101   CO2 15* 29 28   PHOS 2.2* 1.8* 4.5   BUN 20 23* 25*   CREATININE 4.66* 4.69* 4.70*       Lab Results   Component Value Date    ALT 9 (L) 03/04/2024    AST 6 (L) 03/04/2024    ALKPHOS 120 (H) 03/04/2024    BILITOT 0.3 03/04/2024     Lab Results   Component Value Date    TSH 3.01 10/19/2022    XYF3LVA 3.28 02/27/2024      Lab Results   Component Value Date    LABA1C 5.5 03/01/2024    LABA1C 5.9 (H) 12/14/2023    LABA1C 6.6 (H) 11/02/2023     Factors impacting BG management  Factor Dose Comments   Nutrition:  CL meals  Tube feeding via NG     Stopped        Drugs:  Steroids  Epogen  Blood transfusion(s)   Last dose 2/28/24  Yes  PRBC X2 in January 2024  Another NOW   Impairs insulin action  A1cs inaccurate  A1cs inaccurate   Pain Scheduled Cymbalta & lidocaine patch    Infection NA Afebrile  WBC prashant markedly   Other:   Kidney function  Liver function   CKD - dialysis  Normal liver enzymes      Blood glucose pattern      Significant diabetes-related events  2/21/24 Admission . CKD  2/22-27/24 No insulin given. Bgs <180. Prednisone doses X2  2/28/24 Prednsone dose X1 => BG to >200. Tfs+  3/2-4/24 Bgs into the 200s requiring corrective insulin. Began scheduled NPH on 3/4    Assessment and Nursing Intervention   Nursing Diagnosis Risk for unstable blood glucose pattern   Nursing Intervention Domain 0681 Decision-making

## 2024-03-05 NOTE — PLAN OF CARE
2000 Report received from JANES Vizcarra. Patient alert and oriented but very lethargic, opens eyes to verbal stimuli and says very few words. Pt resting in bed, using accessory muscles to breathe and complaining of generalized pain. Pt repositioned in bed, some improvement noted in breathing pattern and pt verbalized pain improvement as well. PD in progress and virtual safety sitter ongoing.    Shift summary.    0430 HGB 6.8, Jazmine NP notified, type and screen drawn. Pt alert but not cognitive enough for consent, will call pt's daughter when blood is available for consent.  0530 RADHA Lucero unable to get in touch with pt's daughter.    0735 Bedside shift change report given to JANES Claros by JAENS Person. Report included the following information SBAR, Kardex, MAR, Accordion, and Recent Results and Cardiac Rhythm NSR.    Problem: Pain  Goal: Verbalizes/displays adequate comfort level or baseline comfort level  Outcome: Progressing  Flowsheets (Taken 3/4/2024 1845 by Carmita Arevalo, RN)  Verbalizes/displays adequate comfort level or baseline comfort level: Encourage patient to monitor pain and request assistance     Problem: Safety - Adult  Goal: Free from fall injury  Outcome: Progressing     Problem: Chronic Conditions and Co-morbidities  Goal: Patient's chronic conditions and co-morbidity symptoms are monitored and maintained or improved  Outcome: Progressing  Flowsheets  Taken 3/4/2024 1845 by Carmita Arevalo, RN  Care Plan - Patient's Chronic Conditions and Co-Morbidity Symptoms are Monitored and Maintained or Improved: Monitor and assess patient's chronic conditions and comorbid symptoms for stability, deterioration, or improvement  Taken 3/4/2024 0900 by Stephanie Tarango, RN  Care Plan - Patient's Chronic Conditions and Co-Morbidity Symptoms are Monitored and Maintained or Improved: Monitor and assess patient's chronic conditions and comorbid symptoms for stability, deterioration, or improvement     Problem: Skin/Tissue  Integrity  Goal: Absence of new skin breakdown  Description: 1.  Monitor for areas of redness and/or skin breakdown  2.  Assess vascular access sites hourly  3.  Every 4-6 hours minimum:  Change oxygen saturation probe site  4.  Every 4-6 hours:  If on nasal continuous positive airway pressure, respiratory therapy assess nares and determine need for appliance change or resting period.  Outcome: Progressing     Problem: Respiratory - Adult  Goal: Achieves optimal ventilation and oxygenation  Outcome: Progressing     Problem: Skin/Tissue Integrity - Adult  Goal: Incisions, wounds, or drain sites healing without S/S of infection  Outcome: Progressing     Problem: Musculoskeletal - Adult  Goal: Return mobility to safest level of function  Outcome: Progressing     Problem: Gastrointestinal - Adult  Goal: Minimal or absence of nausea and vomiting  Outcome: Progressing     Problem: Genitourinary - Adult  Goal: Absence of urinary retention  Outcome: Progressing     Problem: Infection - Adult  Goal: Absence of infection at discharge  Outcome: Progressing     Problem: Metabolic/Fluid and Electrolytes - Adult  Goal: Electrolytes maintained within normal limits  Outcome: Progressing

## 2024-03-05 NOTE — CONSULTS
Pulmonary, Critical Care, and Sleep Medicine~Consult Note    Name: Galilea Aguilar MRN: [id number]   : 1955 Hospital: [facility]   Date: 3/5/2024 11:22 AM Admission: 2024     Impression Plan   Acute hypoxic respiratory failure  Abnormal chest x-ray, suspect volume overload, but cannot rule out infection at this time.  End-stage renal disease on PD.  Recent peritonitis  Reported history of COPD  Recent history of prolonged Qtc  Electrolyte disturbances  Noted retroperitoneal mass like lesion suspected to be lipoma CT chest later tonight after exchange  Check Pro-Sohan  Continue bronchodilators  Will give 1 dose Solu-Medrol and reassess  At risk for volume overload  Similar distress seems to be upper airway.  Query vocal cord dysfunction?  O2 titration above 90%  Low threshold to restart antibiotics  Low hgb, s/p blood products      Daily Progression:    Consult Note requested by hospitalist service.    Patient was initially hospitalized on 2024 for generalized abdominal pain and intermittent chest pains with fatigue over a week.  Noted to have some diarrhea.  Diagnosed with peritonitis.  She does have end-stage renal disease and is on PD.  She was discharged in early February and readmitted 2024 for she was noted to have a prolonged QTc at that time as well.  GI assessed her and felt that she possibly had gastroparesis.  She does have a retroperitoneal mass possibly lipoma may be contributing as well.  It is reported that she has a history of COPD and started experiencing more shortness of breath after volume removal yesterday.  She did have PFTs and August that showed severe reduction in vital capacity that appeared to be a restrictive ventilatory defect and had flow-volume loop consistent with restrictive pattern.  ABG on 3/4/2024 did not show any CO2 retention.  Chest x-ray showed moderate edema with some improvement in small left pleural effusion is unchanged.  This was done on

## 2024-03-05 NOTE — FLOWSHEET NOTE
PD INITIATION    03/04/24 1945   Vitals   BP (!) 103/57   Pulse 78   Respirations 19   SpO2 100 %   Observations & Evaluations   Level of Consciousness 1   Heart Rhythm Regular  (bedside telemetry)   Respiratory Quality/Effort Accessory muscle use   O2 Device Nasal cannula   Bilateral Breath Sounds Expiratory wheezes;Rhonchi;Inspiratory wheezes   Skin Condition/Temp Warm;Dry   Abdomen Inspection Soft;Rotund   Edema Left upper extremity   RUE Edema Trace   Peritoneal Dialysis Catheter Mid lower abdomen   No placement date or time found.   Catheter Location: Mid lower abdomen   Status Accessed   Site Condition Clean, dry, intact   Dressing Status Clean, dry & intact   Dressing Gauze  (gentamicin cream)   Date of Last Dressing Change 03/04/24   Dialysis Type Continuous cycling   Exit Site Condition excellent   Catheter Care Given Yes  (one minute alcavis scrub followed by one minute soak)   Cycler   Verification of Prescription CCPD   Informed Consent    (chronic consent conveys)   Total Volume Programmed 82859 mL   Therapy Time (Hours:Minutes) 9:30   Cycler Type [facility]   Fill Volume 2500 mL   Last Fill Volume 0 mL   Dextrose Setting Same (Nonextraneal)   Number of Cycles 5   Bag Volume 6000 mL   Number of Bags Used 3  (bag one and two 2.5% and bag three 1.5%)     Time Out (time): 1940  Primary RN SBAR: KATELYN Arevalo RN   Patient Education: Procedural   Hepatitis B Surface Ag   Date/Time Value Ref Range Status   02/26/2024 04:19 AM <0.10 Index Final     Hep B S Ab   Date/Time Value Ref Range Status   02/29/2024 07:51 AM <3.10 mIU/mL Final     Comments;  Assessment prior to patient being transferred to advanced level of care including inspiratory and expiratory wheezing with rhonchi, discussed with GIL Fragoso NP, order updated to include two 2.5% dianeal bags and one 1.5%. After patient transferred to [facility] PD treatment initiated as ordered.  Primary nurse at bedside.  On departure bed in low position, side rails up

## 2024-03-05 NOTE — CARE COORDINATION
Transition Of Care:    Nephrology, GI, ID, Pulm, PT/OT following. Therapy recommending SNF. The patient is a Peritoneal Dialysis patient from home and the only SNF's doing PD dialysis in the area are [facility] and [place].  CM called all district liaisons to inquire on SNF's accepting PD patients and these were the only two.  CM called patient's daughter: Chaz and she declines the patient to go to these SNF's and will plan to take the patient home and [facility]. [facility] has accepted. Transport TBD    [facility]   Address: [address] [place] [address], [address]  Phone: [phone], Fax: [phone]    RUR: 37% High  Prior Level of Functioning: Modified independent, uses a walker, cane and wheelchair, Family assists with bathing and dressing and [facility]  Disposition: Home with [facility] PT/OT  If SNF or IPR: Date FOC offered: 3/1/24  Date FOC received: Choice pending  Accepting facility: None. Patient's daughter declines SNF.   Date authorization started with reference number:   Date authorization received and expires:   Follow up appointments: NA  DME needed: The patient has dme: Walker, rollator, cane, wheelchair, shower chair.   Transportation at discharge: TBD  IM/IMM Medicare/ letter given: 2/23/24  Caregiver Contact: Son Mak Aguilar [phone]/Daughter Chaz Aguilar [phone]   Discharge Caregiver contacted prior to discharge? NA  Care Conference needed? No  Barriers to discharge: None    Gia Hardin RN/CRM  [phone]

## 2024-03-05 NOTE — FLOWSHEET NOTE
PD INITIATION   03/05/24 0630   Vitals   /61   Temp 96.9 °F (36.1 °C)   Pulse 81   Respirations 20   SpO2 100 %   Observations & Evaluations   Level of Consciousness 1   Heart Rhythm Regular   Respiratory Quality/Effort Unlabored  (at rest)   O2 Device Nasal cannula   Bilateral Breath Sounds Rhonchi  (rhonic more on right than left)   Skin Condition/Temp Warm;Dry   Abdomen Inspection Obese;Rounded   LUE Edema Trace   Post-Treatment (Cycler)   Average Dwell Time (Hours:Minutes) 1:24   Lost Dwell Time (Hours:Minutes) 0:08   Effluent Appearance Clear;Yellow   PD Output (mL) 782 mL  (ID 53 + uf 300 + MANUAL TRAIN AFTER END OF THERAPY 429= NET UF of 782)     Primary RN SBAR: MIKE Cool RN   Time out:  0625  Comments: treatment was completed upon arrival,  patient repositioned and a manual drain performed on cycler for an additional 429 ml.  One minute alcavis srub followed by one minute soak and sterile minicap. Dressing dated 03/04/24 clean, dry and intact.  Bed in low position, call bell within reach SBAR with primary nurse.

## 2024-03-05 NOTE — FLOWSHEET NOTE
PD INITIATION   03/05/24 1630   Vitals   /60   Pulse 76   Respirations 17   SpO2 100 %   Observations & Evaluations   Level of Consciousness 0   Heart Rhythm Regular   Respiratory Quality/Effort Accessory muscle use  (abdominal accessory muscle use)   O2 Device Nasal cannula   Bilateral Breath Sounds Rhonchi  (more right upper lobes)   Skin Condition/Temp Dry;Warm   Abdomen Inspection Obese;Rounded   RUE Edema Trace   LUE Edema Trace   Peritoneal Dialysis Catheter Mid lower abdomen   No placement date or time found.   Catheter Location: Mid lower abdomen   Status Accessed   Site Condition Clean, dry, intact   Dressing Status New dressing applied   Dressing Gauze  (gentamicin cream)   Date of Last Dressing Change 03/05/24   Dialysis Type Continuous cycling   Exit Site Condition excellent   Catheter Care Given Yes  (one minute alcavis scrub followed by one minute soak)   Cycler   Verification of Prescription CCPD   Total Volume Programmed 62924 mL   Therapy Time (Hours:Minutes) 9:30   Cycler Type Jung [facility]Choice   Fill Volume 2500 mL   Last Fill Volume 0 mL   Dextrose Setting Same (Nonextraneal)   Number of Cycles 5   Bag Volume 6000 mL   Number of Bags Used 3   Dianeal Solution   (all 2.5%)     Primary RN SBAR: BRONWYN Armando RN   Patient Education: infection control wearing mask  Hospital associated wait time; reason: 0  Hepatitis B Surface Ag   Date/Time Value Ref Range Status   02/26/2024 04:19 AM <0.10 Index Final     Hep B S Ab   Date/Time Value Ref Range Status   02/29/2024 07:51 AM <3.10 mIU/mL Final     Comments:  orders, labs consent (chronic consent conveys) and code status reviewed.  PD initiated as ordered.  Start:  16:30  End time:  0200    Patient for a CT scan of chest in AM @0700, [facility] PD Nurse will be here to disconnect before that time.

## 2024-03-05 NOTE — PROGRESS NOTES
Physical therapy:    Chart reviewed and noted Hgb 6.8. Pt is to receive a unit of blood. Will defer and continue to follow. Thank you.    Malika Singh, PT, DPT

## 2024-03-05 NOTE — PROGRESS NOTES
Physician Progress Note      PATIENT:               LULY WIGGINS  CSN #:                  [id number]  :                       1955  ADMIT DATE:       2024 10:38 PM  DISCH DATE:  RESPONDING  PROVIDER #:        Jean-Claude Castaneda MD          QUERY TEXT:    Pt admitted with intractable nausea and vomiting. Nephrology noted CXR on   3/4/24 with worsening pulmonary edema and adjusted PD to 4.25 percent   exchange, increased from 1.5 and 2.5 percent previously. Please document in   progress notes and discharge summary if any of the following are being   evaluated and/or treated:    The medical record reflects the following:    Risk Factors: 68-year-old female with PMH of DM, gastroparesis, ESRD on PD and   recent admission of sepsis due to peritonitis presented with intractable   nausea and vomiting and received IVF during admission  Clinical Indicators:  -- Nephrology 3/4: \"ESRD on PD - on CCPD 1.5% overnight-> Plan to adjust   dialysate add 2.5% (1) bag for now. -  BP improved overnight. - 4.25% exchange   this AM with CXR showing worsening pulmonary edema\"  -- CXR 3/3 = No evidence of pneumonia. Mild interstitial edema. Stable   cardiomegaly.  -- CXR 3/4 = Worsened pulmonary edema.  Treatment: Adjusted peritoneal dialysate to 4.25% (increased from 1.5 and   2.5%), imaging studies, Nephrology consult, IVF stopped    Thank-you,  Dominic Vogt RN, CCDS, CRCR  Clinical   [email or web address]  [phone]  You can also contact me via Perfect Serve.  Options provided:  -- Acute pulmonary edema due to fluid overload  -- Noncardiogenic acute pulmonary edema due to fluid overload  -- Other - I will add my own diagnosis  -- Disagree - Not applicable / Not valid  -- Disagree - Clinically unable to determine / Unknown  -- Refer to Clinical Documentation Reviewer    PROVIDER RESPONSE TEXT:    This patient has acute pulmonary edema due to due to fluid overload.    Query created by: Sin

## 2024-03-06 ENCOUNTER — APPOINTMENT (OUTPATIENT)
Facility: HOSPITAL | Age: 69
End: 2024-03-06
Payer: MEDICARE

## 2024-03-06 LAB
ABO + RH BLD: NORMAL
ALBUMIN SERPL-MCNC: 1.5 G/DL (ref 3.5–5)
ALBUMIN SERPL-MCNC: 1.5 G/DL (ref 3.5–5)
ALBUMIN/GLOB SERPL: 0.3 (ref 1.1–2.2)
ALP SERPL-CCNC: 128 U/L (ref 45–117)
ALT SERPL-CCNC: 11 U/L (ref 12–78)
ANION GAP SERPL CALC-SCNC: 10 MMOL/L (ref 5–15)
ANION GAP SERPL CALC-SCNC: 8 MMOL/L (ref 5–15)
AST SERPL-CCNC: 8 U/L (ref 15–37)
BASOPHILS # BLD: 0 K/UL (ref 0–0.1)
BASOPHILS NFR BLD: 0 % (ref 0–1)
BILIRUB DIRECT SERPL-MCNC: 0.2 MG/DL (ref 0–0.2)
BILIRUB SERPL-MCNC: 0.4 MG/DL (ref 0.2–1)
BLD PROD TYP BPU: NORMAL
BLOOD BANK BLOOD PRODUCT EXPIRATION DATE: NORMAL
BLOOD BANK DISPENSE STATUS: NORMAL
BLOOD BANK ISBT PRODUCT BLOOD TYPE: 7300
BLOOD BANK UNIT TYPE AND RH: NORMAL
BLOOD GROUP ANTIBODIES SERPL: NORMAL
BPU ID: NORMAL
BUN SERPL-MCNC: 25 MG/DL (ref 6–20)
BUN SERPL-MCNC: 26 MG/DL (ref 6–20)
BUN/CREAT SERPL: 5 (ref 12–20)
BUN/CREAT SERPL: 6 (ref 12–20)
CALCIUM SERPL-MCNC: 7.7 MG/DL (ref 8.5–10.1)
CALCIUM SERPL-MCNC: 7.7 MG/DL (ref 8.5–10.1)
CHLORIDE SERPL-SCNC: 101 MMOL/L (ref 97–108)
CHLORIDE SERPL-SCNC: 101 MMOL/L (ref 97–108)
CO2 SERPL-SCNC: 25 MMOL/L (ref 21–32)
CO2 SERPL-SCNC: 28 MMOL/L (ref 21–32)
CREAT SERPL-MCNC: 4.68 MG/DL (ref 0.55–1.02)
CREAT SERPL-MCNC: 4.72 MG/DL (ref 0.55–1.02)
CROSSMATCH RESULT: NORMAL
DIFFERENTIAL METHOD BLD: ABNORMAL
EOSINOPHIL # BLD: 0 K/UL (ref 0–0.4)
EOSINOPHIL NFR BLD: 0 % (ref 0–7)
ERYTHROCYTE [DISTWIDTH] IN BLOOD BY AUTOMATED COUNT: 19.2 % (ref 11.5–14.5)
GLOBULIN SER CALC-MCNC: 4.4 G/DL (ref 2–4)
GLUCOSE BLD STRIP.AUTO-MCNC: 168 MG/DL (ref 65–117)
GLUCOSE BLD STRIP.AUTO-MCNC: 173 MG/DL (ref 65–117)
GLUCOSE BLD STRIP.AUTO-MCNC: 174 MG/DL (ref 65–117)
GLUCOSE BLD STRIP.AUTO-MCNC: 195 MG/DL (ref 65–117)
GLUCOSE BLD STRIP.AUTO-MCNC: 203 MG/DL (ref 65–117)
GLUCOSE SERPL-MCNC: 316 MG/DL (ref 65–100)
GLUCOSE SERPL-MCNC: 320 MG/DL (ref 65–100)
HCT VFR BLD AUTO: 25.6 % (ref 35–47)
HGB BLD-MCNC: 8.2 G/DL (ref 11.5–16)
IMM GRANULOCYTES # BLD AUTO: 0.1 K/UL (ref 0–0.04)
IMM GRANULOCYTES NFR BLD AUTO: 1 % (ref 0–0.5)
LYMPHOCYTES # BLD: 0.6 K/UL (ref 0.8–3.5)
LYMPHOCYTES NFR BLD: 5 % (ref 12–49)
MAGNESIUM SERPL-MCNC: 1.8 MG/DL (ref 1.6–2.4)
MCH RBC QN AUTO: 29.2 PG (ref 26–34)
MCHC RBC AUTO-ENTMCNC: 32 G/DL (ref 30–36.5)
MCV RBC AUTO: 91.1 FL (ref 80–99)
MONOCYTES # BLD: 0.3 K/UL (ref 0–1)
MONOCYTES NFR BLD: 2 % (ref 5–13)
NEUTS SEG # BLD: 11.9 K/UL (ref 1.8–8)
NEUTS SEG NFR BLD: 92 % (ref 32–75)
NRBC # BLD: 0.02 K/UL (ref 0–0.01)
NRBC BLD-RTO: 0.2 PER 100 WBC
PHOSPHATE SERPL-MCNC: 4 MG/DL (ref 2.6–4.7)
PHOSPHATE SERPL-MCNC: 4.2 MG/DL (ref 2.6–4.7)
PLATELET # BLD AUTO: 168 K/UL (ref 150–400)
PMV BLD AUTO: 10.2 FL (ref 8.9–12.9)
POTASSIUM SERPL-SCNC: 4 MMOL/L (ref 3.5–5.1)
POTASSIUM SERPL-SCNC: 4 MMOL/L (ref 3.5–5.1)
PROT SERPL-MCNC: 5.9 G/DL (ref 6.4–8.2)
RBC # BLD AUTO: 2.81 M/UL (ref 3.8–5.2)
RBC MORPH BLD: ABNORMAL
SERVICE CMNT-IMP: ABNORMAL
SODIUM SERPL-SCNC: 136 MMOL/L (ref 136–145)
SODIUM SERPL-SCNC: 137 MMOL/L (ref 136–145)
SPECIMEN EXP DATE BLD: NORMAL
UNIT DIVISION: 0
UNIT ISSUE DATE/TIME: NORMAL
VIT B12 SERPL-MCNC: 1070 PG/ML (ref 193–986)
WBC # BLD AUTO: 12.9 K/UL (ref 3.6–11)

## 2024-03-06 PROCEDURE — A4216 STERILE WATER/SALINE, 10 ML: HCPCS | Performed by: NURSE PRACTITIONER

## 2024-03-06 PROCEDURE — 82962 GLUCOSE BLOOD TEST: CPT

## 2024-03-06 PROCEDURE — 6370000000 HC RX 637 (ALT 250 FOR IP): Performed by: HOSPITALIST

## 2024-03-06 PROCEDURE — 80076 HEPATIC FUNCTION PANEL: CPT

## 2024-03-06 PROCEDURE — 6370000000 HC RX 637 (ALT 250 FOR IP): Performed by: STUDENT IN AN ORGANIZED HEALTH CARE EDUCATION/TRAINING PROGRAM

## 2024-03-06 PROCEDURE — 97116 GAIT TRAINING THERAPY: CPT

## 2024-03-06 PROCEDURE — 94760 N-INVAS EAR/PLS OXIMETRY 1: CPT

## 2024-03-06 PROCEDURE — 6370000000 HC RX 637 (ALT 250 FOR IP): Performed by: CLINICAL NURSE SPECIALIST

## 2024-03-06 PROCEDURE — 6370000000 HC RX 637 (ALT 250 FOR IP): Performed by: NURSE PRACTITIONER

## 2024-03-06 PROCEDURE — 94640 AIRWAY INHALATION TREATMENT: CPT

## 2024-03-06 PROCEDURE — 2580000003 HC RX 258: Performed by: NURSE PRACTITIONER

## 2024-03-06 PROCEDURE — 97535 SELF CARE MNGMENT TRAINING: CPT

## 2024-03-06 PROCEDURE — 97530 THERAPEUTIC ACTIVITIES: CPT

## 2024-03-06 PROCEDURE — 2700000000 HC OXYGEN THERAPY PER DAY

## 2024-03-06 PROCEDURE — 80069 RENAL FUNCTION PANEL: CPT

## 2024-03-06 PROCEDURE — 80048 BASIC METABOLIC PNL TOTAL CA: CPT

## 2024-03-06 PROCEDURE — 6370000000 HC RX 637 (ALT 250 FOR IP): Performed by: INTERNAL MEDICINE

## 2024-03-06 PROCEDURE — 6360000002 HC RX W HCPCS: Performed by: NURSE PRACTITIONER

## 2024-03-06 PROCEDURE — 90945 DIALYSIS ONE EVALUATION: CPT

## 2024-03-06 PROCEDURE — 71250 CT THORAX DX C-: CPT

## 2024-03-06 PROCEDURE — 36415 COLL VENOUS BLD VENIPUNCTURE: CPT

## 2024-03-06 PROCEDURE — 82607 VITAMIN B-12: CPT

## 2024-03-06 PROCEDURE — 83735 ASSAY OF MAGNESIUM: CPT

## 2024-03-06 PROCEDURE — 99223 1ST HOSP IP/OBS HIGH 75: CPT | Performed by: STUDENT IN AN ORGANIZED HEALTH CARE EDUCATION/TRAINING PROGRAM

## 2024-03-06 PROCEDURE — 84100 ASSAY OF PHOSPHORUS: CPT

## 2024-03-06 PROCEDURE — 85025 COMPLETE CBC W/AUTO DIFF WBC: CPT

## 2024-03-06 PROCEDURE — 99231 SBSQ HOSP IP/OBS SF/LOW 25: CPT | Performed by: CLINICAL NURSE SPECIALIST

## 2024-03-06 PROCEDURE — C9113 INJ PANTOPRAZOLE SODIUM, VIA: HCPCS | Performed by: NURSE PRACTITIONER

## 2024-03-06 PROCEDURE — 2060000000 HC ICU INTERMEDIATE R&B

## 2024-03-06 PROCEDURE — 82746 ASSAY OF FOLIC ACID SERUM: CPT

## 2024-03-06 RX ADMIN — Medication 5000 UNITS: at 10:15

## 2024-03-06 RX ADMIN — CARVEDILOL 6.25 MG: 6.25 TABLET, FILM COATED ORAL at 19:29

## 2024-03-06 RX ADMIN — ARFORMOTEROL TARTRATE: 15 SOLUTION RESPIRATORY (INHALATION) at 22:04

## 2024-03-06 RX ADMIN — MONTELUKAST 10 MG: 10 TABLET, FILM COATED ORAL at 21:12

## 2024-03-06 RX ADMIN — PHENOL 1 SPRAY: 1.4 SPRAY ORAL at 21:18

## 2024-03-06 RX ADMIN — SODIUM CHLORIDE, SODIUM LACTATE, CALCIUM CHLORIDE, MAGNESIUM CHLORIDE AND DEXTROSE 6000 ML: 2.5; 538; 448; 18.3; 5.08 INJECTION, SOLUTION INTRAPERITONEAL at 18:00

## 2024-03-06 RX ADMIN — Medication 100 MG: at 10:15

## 2024-03-06 RX ADMIN — ARFORMOTEROL TARTRATE: 15 SOLUTION RESPIRATORY (INHALATION) at 08:06

## 2024-03-06 RX ADMIN — APIXABAN 2.5 MG: 2.5 TABLET, FILM COATED ORAL at 21:12

## 2024-03-06 RX ADMIN — INSULIN HUMAN 9 UNITS: 100 INJECTION, SUSPENSION SUBCUTANEOUS at 21:14

## 2024-03-06 RX ADMIN — PANTOPRAZOLE SODIUM 40 MG: 40 INJECTION, POWDER, FOR SOLUTION INTRAVENOUS at 10:15

## 2024-03-06 RX ADMIN — SODIUM BICARBONATE 650 MG: 650 TABLET ORAL at 10:15

## 2024-03-06 RX ADMIN — Medication 3 MG: at 21:11

## 2024-03-06 RX ADMIN — ACETAMINOPHEN 650 MG: 325 TABLET ORAL at 21:11

## 2024-03-06 RX ADMIN — INSULIN HUMAN 9 UNITS: 100 INJECTION, SUSPENSION SUBCUTANEOUS at 10:17

## 2024-03-06 RX ADMIN — DULOXETINE HYDROCHLORIDE 20 MG: 20 CAPSULE, DELAYED RELEASE ORAL at 10:16

## 2024-03-06 RX ADMIN — ASPIRIN 81 MG CHEWABLE TABLET 81 MG: 81 TABLET CHEWABLE at 10:16

## 2024-03-06 RX ADMIN — Medication 10 ML: at 21:15

## 2024-03-06 RX ADMIN — GENTAMICIN SULFATE: 1 CREAM TOPICAL at 18:01

## 2024-03-06 RX ADMIN — Medication 10 ML: at 10:17

## 2024-03-06 RX ADMIN — SODIUM BICARBONATE 650 MG: 650 TABLET ORAL at 21:12

## 2024-03-06 RX ADMIN — DULOXETINE HYDROCHLORIDE 20 MG: 20 CAPSULE, DELAYED RELEASE ORAL at 21:14

## 2024-03-06 RX ADMIN — ROSUVASTATIN CALCIUM 10 MG: 10 TABLET, FILM COATED ORAL at 21:11

## 2024-03-06 RX ADMIN — CARVEDILOL 6.25 MG: 6.25 TABLET, FILM COATED ORAL at 10:15

## 2024-03-06 ASSESSMENT — PAIN DESCRIPTION - DESCRIPTORS
DESCRIPTORS: ACHING;BURNING
DESCRIPTORS: ACHING;BURNING
DESCRIPTORS: ACHING

## 2024-03-06 ASSESSMENT — PAIN SCALES - GENERAL
PAINLEVEL_OUTOF10: 3
PAINLEVEL_OUTOF10: 7

## 2024-03-06 ASSESSMENT — PAIN - FUNCTIONAL ASSESSMENT: PAIN_FUNCTIONAL_ASSESSMENT: PREVENTS OR INTERFERES SOME ACTIVE ACTIVITIES AND ADLS

## 2024-03-06 ASSESSMENT — PAIN DESCRIPTION - PAIN TYPE: TYPE: ACUTE PAIN

## 2024-03-06 ASSESSMENT — PAIN DESCRIPTION - LOCATION
LOCATION: BACK
LOCATION: THROAT
LOCATION: THROAT

## 2024-03-06 NOTE — CONSULTS
palliative questions or concerns.  Palliative Care Team is available via perfect serve or via phone.    Referrals to:   [] Outpatient Palliative Care  [] Home Based Palliative Care  [] Home Based Primary Care  [] Hospice       ADVANCE CARE PLANNING:   [x] The [facility] Interdisciplinary Team has updated the ACP Navigator with Health Care Decision Maker and Patient Capacity      Primary Decision Maker (Active): Chaz Aguilar - Child - [phone]    Primary Decision Maker: Mak Aguilar - Child - [phone]  Confirm Advance Directive: None  Patient Would Like to Complete Advance Directive: No/refused    Current Code Status: Full Code            Please refer to Palliative Medicine ACP notes for further details.    PALLIATIVE ASSESSMENT:      Palliative Performance Scale (PPS):  PPS: 40    ECOG:   ECOG Status : Limited self-care [3]    Modified ESAS:  Modified-[place] Symptom Assessment Scale (ESAS)  Tiredness Score: 3  Drowsiness Score: Not drowsy  Pain Score: No pain  Anxiety Score: Not anxious  Nausea Score: Not nauseated  Appetite Score: 7  Dyspnea Score: 5  Wellbeing Score: 5    Clinical Pain Assessment (nonverbal scale for severity on nonverbal patients):   Clinical Pain Assessment  Severity: 0       NVPS:       RDOS:  RDOS  Heart rate per minute: less than 90  Respiratory rate per minute: 19 to 30  Restlessness:non-purposeful: None  Paradoxical breathing pattern:abdomen moves in on inspiration: Present  Accessory muscle use: rise in clavicle during inspiration: Slight rise  Grunting at end-expiration: guttural sound: None  Nasal flaring: involuntary movement of nares: None  Look of fear: None  Total : 4      Vital Signs: Blood pressure 132/61, pulse 77, temperature 97.7 °F (36.5 °C), temperature source Oral, resp. rate 14, height 1.6 m (5' 3\"), weight 73.5 kg (162 lb 0.6 oz), SpO2 100 %.    PHYSICAL ASSESSMENT:   General: [] Oriented x3  [] Well appearing  [] Intubated  []Ill appearing  [x]Other: Appears  anxious    ESAS Depression:          LAB AND IMAGING FINDINGS:   Objective data reviewed:  labs, images, records, medication use, vitals, and chart     FINAL COMMENTS   Thank you for allowing Palliative Medicine to participate in the care of Galilea SMILEY Aguilar.    Only check if applicable and billing time based rather than MDM  [x] The total encounter time on this service date was 35_ minutes which was spent performing a face-to-face encounter and personally completing the provider-level activities documented in the note. This includes time spent prior to the visit and after the visit in direct care of the patient. This time does not include time spent in any separately reportable services.    Electronically signed by   DAYA Nickerson NP  Palliative Care Team  on 3/6/2024 at 10:33 AM

## 2024-03-06 NOTE — FLOWSHEET NOTE
PD INITIATION    03/06/24 1800   Vitals   BP (!) 146/70   Temp 98 °F (36.7 °C)   Temp Source Oral   Pulse 77   Respirations 17   SpO2 99 %   Observations & Evaluations   Level of Consciousness 0   Heart Rhythm Regular   O2 Device None (Room air)   Skin Condition/Temp Warm;Dry   Abdomen Inspection Obese;Rounded   RUE Edema Trace   LUE Edema Trace   Peritoneal Dialysis Catheter Mid lower abdomen   No placement date or time found.   Catheter Location: Mid lower abdomen   Status Accessed   Site Condition Clean, dry, intact   Dressing Status New dressing applied   Dressing Gauze  (gentamcin cream)   Date of Last Dressing Change 03/06/24   Dialysis Type Continuous cycling   Exit Site Condition excellent   Catheter Care Given Yes   Cycler   Verification of Prescription CCPD   Informed Consent    (chronic consent conveys)   Total Volume Programmed 89006 mL   Therapy Time (Hours:Minutes) 9:30   Cycler Type [facility]   Fill Volume 2500 mL   Last Fill Volume 0 mL   Dextrose Setting Same (Nonextraneal)   I Drain Alarm 0 mL   Number of Cycles 5   Bag Volume 6000 mL   Number of Bags Used 3   Dianeal Solution   (2.5%)     Time Out (time): 1750  Primary RN SBAR: infection control wearing mask  Patient Education: HAIR Salazar RN   Hepatitis B Surface Ag   Date/Time Value Ref Range Status   02/26/2024 04:19 AM <0.10 Index Final     Hep B S Ab   Date/Time Value Ref Range Status   02/29/2024 07:51 AM <3.10 mIU/mL Final      Orders, labs, and code status reviewed.  PD treatment initiated as ordered.  On departure bed in low position, side rails up x 2, call bell within reach, patient being monitored, SBAR with primary

## 2024-03-06 NOTE — CONSULTS
Neurology Consult Note     NAME: Galilea Aguilar   :  1955   MRN:  [id number]   DATE:  3/6/2024    Assessment and Plan:     69 yo F with multiple medical problems, h/o CVA most recently in 2023 on Eliquis and ASA currently hospitalized for vomiting and flank pain. On 3/5, had episode of audiovisual hallucination. Exam shows at least 4/5 strength throughout, slow but intact speech, fully oriented, no numbness. Presentation consistent with delirium. Not consistent with acute stroke or seizure.    - Delirium precautions (open windows and keep lights on during the day, turn off TV and lights at night, frequent reorientation as needed, etc)  - Continue melatonin 3 mg nightly  - Can consider adding trazodone 25 mg nightly if patient has recurrent delirium  - No further neurologic workup at this time    We will sign off at this time.    Subjective   HPI:  Galilea Aguilar is a 68 y.o. female who presents with Vomiting. Neurology was consulted for AMS. History obtained per patient and chart review.    Ms. Aguilar is currently hospitalized for vomiting and L flank pain. Recent sepsis and has had prior strokes, most recently in 2023. On 3/5, patient had an episode of audovisual hallucination where she witnessed men taking babies and killing them. Hallucinations have since resolved. Patient endorses prior stroke but denies current new numbness, tingling, or weakness. Has tingling in hands and feet which is chronic. Feels at baseline currently in terms of strength and speech.     ROS:  A comprehensive ROS was negative except as noted in the HPI above.    PMH:  Past Medical History:   Diagnosis Date    Asthma     CAD (coronary artery disease)     COPD (chronic obstructive pulmonary disease) ([facility])     PCP manages    CVA (cerebral vascular accident) ([facility]) 2023     No Yes   Sig: Take 1 tablet by mouth 2 times daily (with meals) Hold for HR < 60 or SBP < 110   diclofenac sodium (VOLTAREN) 1 % GEL Unknown  Yes No   Sig: Apply 4 g topically daily as needed for Pain Apply thin layer to back daily as needed for pain   fluconazole (DIFLUCAN) 100 MG tablet 2/21/2024  No Yes   Sig: Take 1 tablet by mouth daily for 21 days   folic acid (FOLVITE) 1 MG tablet 2/21/2024  No Yes   Sig: Take 1 tablet by mouth daily   insulin glargine (LANTUS) 100 UNIT/ML injection vial 2/21/2024  No Yes   Sig: Inject 5 Units into the skin nightly   ipratropium-albuterol (DUONEB) 0.5-2.5 (3) MG/3ML SOLN nebulizer solution Unknown  Yes No   Sig: Inhale 3 mLs into the lungs every 6 hours as needed for Shortness of Breath or Wheezing   magnesium oxide (MAG-OX) 400 (240 Mg) MG tablet   No No   Sig: Take 1 tablet by mouth 2 times daily   melatonin 3 MG TABS tablet Past Week  No Yes   Sig: Take 2 tablets by mouth nightly as needed (insomnia)   methocarbamol (ROBAXIN) 500 MG tablet 2/21/2024  Yes Yes   Sig: Take 1 tablet by mouth 3 times daily   midodrine (PROAMATINE) 5 MG tablet 2/21/2024  No Yes   Sig: Take 1 tablet by mouth 3 times daily as needed (spb <110, hold for sbp >120)   mometasone-formoterol (DULERA) 100-5 MCG/ACT inhaler 2/21/2024  Yes Yes   Sig: Inhale 2 puffs into the lungs 2 times daily   montelukast (SINGULAIR) 10 MG tablet 2/21/2024  Yes Yes   Sig: Take 1 tablet by mouth nightly   pantoprazole (PROTONIX) 40 MG tablet   No No   Sig: Take 1 tablet by mouth 2 times daily (before meals)   rosuvastatin (CRESTOR) 40 MG tablet 2/21/2024  Yes Yes   Sig: Take 1 tablet by mouth daily      Facility-Administered Medications: None         CURRENT MEDS:    Current Facility-Administered Medications:     0.9 % sodium chloride infusion, , IntraVENous, PRN, Ringgold, Jazmine A, APRN - NP    insulin NPH (HumuLIN N;NovoLIN N) injection vial 9 Units, 9 Units, SubCUTAneous, 2 times per day, Brii Mercado, APRN - CNS,

## 2024-03-06 NOTE — FLOWSHEET NOTE
03/06/24 0600   Vitals   /61   Pulse 67   Respirations 15   SpO2 100 %   Observations & Evaluations   Level of Consciousness 1   Heart Rhythm Regular   O2 Device Nasal cannula   Skin Condition/Temp Warm;Dry   Abdomen Inspection Obese;Rounded   RUE Edema Trace   LUE Edema Trace   Peritoneal Dialysis Catheter Mid lower abdomen   No placement date or time found.   Catheter Location: Mid lower abdomen   Status Deaccessed   Dressing Status Clean, dry & intact   Dressing Gauze   Date of Last Dressing Change 03/05/24   Dialysis Type Continuous cycling   Catheter Care Given Yes  (one minute alcavis scrub followed by one minute soak)   Post-Treatment (Cycler)   Average Dwell Time (Hours:Minutes) 1:21   Lost Dwell Time (Hours:Minutes) 0:03   Effluent Appearance Clear;Yellow   PD Output (mL) 880 mL  ( +  = TOTAL )     TIME OUT:  0555  Primary RN SBAR: KAMILAH Oliver RN   Comments: treatment completed, one minute alcavis scrub followed by one minute soak and sterile minicap, catheter secured to abdomen, bed in low position, side rails up x 2, call bell within reach, SBAR with primary nurse.

## 2024-03-06 NOTE — PROGRESS NOTES
Spiritual Care Assessment/Progress Note  [facility]    Name: Galilea Aguilar MRN: [id number]    Age: 68 y.o.     Sex: female   Language: English     Date: 3/6/2024            Total Time Calculated: 15 min              Spiritual Assessment begun in [address]  Service Provided For:: Patient not available  Referral/Consult From:: Palliative Care  Encounter Overview/Reason : Attempted Encounter    Spiritual beliefs:      [x] Involved in a filemon tradition/spiritual practice: Spiritism     [x] Supported by a filemon community:      [] Claims no spiritual orientation:      [] Seeking spiritual identity:           [] Adheres to an individual form of spirituality:      [] Not able to assess:                Identified resources for coping and support system:   Support System: Children, Sabianism/filemon community       [] Prayer                  [] Devotional reading               [] Music                  [] Guided Imagery     [] Pet visits                                        [] Other: (COMMENT)     Specific area/focus of visit   Encounter:    Crisis:    Spiritual/Emotional needs:    Ritual, Rites and Sacraments:    Grief, Loss, and Adjustments:    Ethics/Mediation:    Behavioral Health:    Palliative Care: Type: Palliative Care, Initial/Spiritual Assessment  Advance Care Planning:           Narrative:  visit for Galilea Aguilar in [address].  Reviewed patient's chart.  Patient was off floor. No family present.  Offered silent words of good intent. Spiritual Health will continue to follow and assess for spiritual/emotional support during this hospitalization.     For immediate spiritual care, please contact the  on-call at [phone] ([phone]).    . Unique Thompson MDiv, MSW  Resident

## 2024-03-06 NOTE — PLAN OF CARE
Problem: Occupational Therapy - Adult  Goal: By Discharge: Performs self-care activities at highest level of function for planned discharge setting.  See evaluation for individualized goals.  Description: FUNCTIONAL STATUS PRIOR TO ADMISSION:  Patient lives with her daughter who assists with bathing and lower body ADLs PRN, Mod I with RW for functional mobility, receiving  services.    Occupational Therapy Goals:  Initiated 2/23/2024: WEEKLY REASSESSMENT 03/04/2024  1.  Patient will perform grooming at the sink with Supervision within 7 day(s). (Cont. 03/04)  2.  Patient will perform bathing with Minimal Assist within 7 day(s). (Cont. 03/04)  3.  Patient will perform upper and lower body dressing with Supervision & AE PRN within 7 day(s). (Cont. 03/04)  4.  Patient will perform toilet transfers with Supervision  within 7 day(s). (Cont. 03/04)  5.  Patient will perform all aspects of toileting with Supervision within 7 day(s). (Cont. 03/04)  6.  Patient will participate in upper extremity therapeutic exercise/activities with Supervision for 5 minutes within 7 day(s). (Cont. 03/04)     7.  Patient will utilize energy conservation techniques during functional activities with verbal and visual cues within 7 day(s). (Cont. 03/04)    Occupational Therapy Goals:  Initiated 2/23/2024  1.  Patient will perform grooming at the sink with Supervision within 7 day(s).  2.  Patient will perform bathing with Minimal Assist within 7 day(s).  3.  Patient will perform upper and lower body dressing with Supervision & AE PRN within 7 day(s).  4.  Patient will perform toilet transfers with Supervision  within 7 day(s).  5.  Patient will perform all aspects of toileting with Supervision within 7 day(s).  6.  Patient will participate in upper extremity therapeutic exercise/activities with Supervision for 5 minutes within 7 day(s).    7.  Patient will utilize energy conservation techniques during functional activities with verbal and  visual cues within 7 day(s).    Outcome: Progressing   OCCUPATIONAL THERAPY TREATMENT  Patient: Galilea Aguilar (68 y.o. female)  Date: 3/6/2024  Primary Diagnosis: Hypomagnesemia [E83.42]  Vomiting [R11.10]  Prolonged Q-T interval on ECG [R94.31]  Intractable nausea and vomiting [R11.2]       Precautions: Contact Precautions, Fall Risk                Chart, occupational therapy assessment, plan of care, and goals were reviewed.    ASSESSMENT  Patient continues to benefit from skilled OT services and is progressing towards goals. Pt participated well today in therapy.  Less confusion and no hallucinations noted.  Pt achieved sitting on EOB with min-CGA.  Vital stable.  Pt accessed chair with min assist x 1 using RW. Seated in chair, pt able to pull socks up from her ankle with SBA.  Pt achieved bathroom distance with toileting, will con't to practice.  Pt still limited by generalized weakness, activity tolerance, mild confusion and balance.  Continue to recommend SNF.  Will con't to follow.              PLAN :  Patient continues to benefit from skilled intervention to address the above impairments.  Continue treatment per established plan of care to address goals.    Recommend with staff:     Recommend next OT session: access bathroom    Recommendation for discharge: (in order for the patient to meet his/her long term goals): Therapy up to 5 days/week in Skilled nursing facility    Other factors to consider for discharge: impaired cognition, high risk for falls, and not safe to be alone    IF patient discharges home will need the following DME: none       SUBJECTIVE:   Patient stated “I'm at [facility].”    OBJECTIVE DATA SUMMARY:   Cognitive/Behavioral Status:  Orientation  Orientation Level: Oriented to place;Oriented to person       Functional Mobility and Transfers for ADLs:  Bed Mobility:  Bed Mobility Training  Supine to Sit: Minimum assistance;Contact-guard assistance  Scooting: Stand-by assistance

## 2024-03-06 NOTE — WOUND CARE
Wound Care Note:       Wound care follow up for left 5th toe wound     Chart shows:  Admitted for vomiting  Past Medical History:   Diagnosis Date    Asthma     CAD (coronary artery disease)     COPD (chronic obstructive pulmonary disease) ([facility])     PCP manages    CVA (cerebral vascular accident) ([facility]) 11/01/2023    Patient presented with right-sided weakness.  Per MRI of the brain on 11/1/2023, acute versus subacute embolic infarctions involving the left MCA territory and right PICA territory    Diabetes mellitus, type 2 ([facility])     ESRD on peritoneal dialysis ([facility])     Eduardo (Tammie-PD nurse) [facility] [phone]- was on hemodialysis M-W-F Eduardo - now on PD    GERD (gastroesophageal reflux disease)     History of blood transfusion     Hyperlipidemia     Hypertension    WBC = 12.9 on 3/6/24  Admitted from home    Assessment:   Patient is alert and talking, incontinent with no assistance needed in repositioning.    Bed: [facility]  Patient wearing briefs for incontinence.    Diet: Adult diet clear liquid; tube feeding  Patient reports some pain on left side and back.      Bilateral heels, buttocks, and sacral skin intact and without erythema.  Bilateral heels with dyschromia, patient's natural pigmentation.    Palpable DP pulses bilaterally.      1. Left 5th toe medial aspect with fissure is improving, appears thinner today, wound bed is pink, wound edges are open.  Z guard paste applied.    Patient repositioned to the right.  Heels offloaded on pillows.     Recommendations:    Left medial left toe at base- Every 12 hours apply zinc oxide (orange tube).    Skin Care & Pressure Prevention:  Minimize layers of linen/pads under patient to optimize support surface.    Turn/reposition approximately every 2 hours and offload heels.  Manage incontinence / promote continence   Nourishing Skin Cream to dry skin, minimize use of briefs when able    Discussed above plan with  patient & JANES Hooks    Transition of Care: Plan to follow as needed while admitted to hospital.    Sally \"Shae\" MARIELA Tello, RN, ON  Certified Wound and Ostomy Nurse  office [phone]  Best way to contact me is through [employer]

## 2024-03-06 NOTE — CARE COORDINATION
03/06/24 0850   Readmission Assessment   Number of Days since last admission? 8-30 days   Previous Disposition Home with Family   Who is being Interviewed Caregiver   What was the patient's/caregiver's perception as to why they think they needed to return back to the hospital? Other (Comment)  (Abdominal pain, chest pain.)   Did you visit your Primary Care Physician after you left the hospital, before you returned this time? No  (Appointment had been made)   Why weren't you able to visit your PCP? Other (Comment)  (Was readmitted)   Did you see a specialist, such as Cardiac, Pulmonary, Orthopedic Physician, etc. after you left the hospital? No  (readmitted to hospital)   Who advised the patient to return to the hospital? Caregiver;Self-referral   Does the patient report anything that got in the way of taking their medications? No   In our efforts to provide the best possible care to you and others like you, can you think of anything that we could have done to help you after you left the hospital the first time, so that you might not have needed to return so soon? Teach back instructions regarding management of illness;Teaching during hospitalization regarding your illness     Gia Hardin RN/CRM

## 2024-03-06 NOTE — PLAN OF CARE
Problem: Physical Therapy - Adult  Goal: By Discharge: Performs mobility at highest level of function for planned discharge setting.  See evaluation for individualized goals.  Description: FUNCTIONAL STATUS PRIOR TO ADMISSION: Patient was modified independent using a rolling walker for functional mobility, wheelchair for longer distances. Pt reported a fall a few days prior to this admission    HOME SUPPORT PRIOR TO ADMISSION: The patient lived with family and required assist for some ADLs.    Physical Therapy Goals  Initiated 2/23/2024, reassessed and remain appropriate 03/04/24   1.  Patient will move from supine to sit and sit to supine and roll side to side in bed with modified independence within 7 day(s).    2.  Patient will perform sit to stand with modified independence within 7 day(s).  3.  Patient will transfer from bed to chair and chair to bed with modified independence using the least restrictive device within 7 day(s).  4.  Patient will ambulate with supervision/set-up for 50 feet with the least restrictive device within 7 day(s).   5.  Patient will ascend/descend 4 stairs with single handrail(s) with supervision/set-up within 7 day(s).    Outcome: Progressing   PHYSICAL THERAPY TREATMENT    Patient: Galilea Aguilar (68 y.o. female)  Date: 3/6/2024  Diagnosis: Hypomagnesemia [E83.42]  Vomiting [R11.10]  Prolonged Q-T interval on ECG [R94.31]  Intractable nausea and vomiting [R11.2] Vomiting      Precautions: Contact Precautions, Fall Risk                      ASSESSMENT:  Patient continues to benefit from skilled PT services and is progressing towards goals. Pt tolerated session well, but continues to be limited by generalized weakness, decreased functional mobility, impaired balance and gait, decreased tolerance to activity. Pt without hallucinations or signs of confusion during today's session. Pt mobilized to EOB with CGA-min A. Pt performed sit<>stands with RW with CGA-min A with proper hand place.

## 2024-03-06 NOTE — PROGRESS NOTES
Pulmonary, Critical Care, and Sleep Medicine~Progress Note    Name: Galilea Aguilar MRN: [id number]   : 1955 Hospital: [facility]   Date: 3/6/2024 11:07 AM Admission: 2024     Impression Plan   Acute hypoxic respiratory failure  Abnormal chest x-ray, suspect volume overload, but cannot rule out infection at this time.  End-stage renal disease on PD.  Recent peritonitis  Reported history of COPD  Recent history of prolonged Qtc  Electrolyte disturbances  Noted retroperitoneal mass like lesion suspected to be lipoma CT chest pending   Continue bronchodilators  Consider infectious work up   At risk for volume overload  Query vocal cord dysfunction?  O2 titration above 90%  Low threshold to restart antibiotics  Low hgb, s/p blood products      Daily Progression:    3/6  Procal 9.16  Breathing much better today     3/5  Consult Note requested by hospitalist service.    Patient was initially hospitalized on 2024 for generalized abdominal pain and intermittent chest pains with fatigue over a week.  Noted to have some diarrhea.  Diagnosed with peritonitis.  She does have end-stage renal disease and is on PD.  She was discharged in early February and readmitted 2024 for she was noted to have a prolonged QTc at that time as well.  GI assessed her and felt that she possibly had gastroparesis.  She does have a retroperitoneal mass possibly lipoma may be contributing as well.  It is reported that she has a history of COPD and started experiencing more shortness of breath after volume removal yesterday.  She did have PFTs and August that showed severe reduction in vital capacity that appeared to be a restrictive ventilatory defect and had flow-volume loop consistent with restrictive pattern.  ABG on 3/4/2024 did not show any CO2 retention.  Chest x-ray showed moderate edema with some improvement in small left pleural effusion is unchanged.  This was done on 3/4/2024 echo was conducted on  (PREPARATION H) rectal ointment   Rectal BID PRN    dextrose bolus 10% 125 mL  125 mL IntraVENous PRN    Or    dextrose bolus 10% 250 mL  250 mL IntraVENous PRN    glucagon injection 1 mg  1 mg SubCUTAneous PRN    dextrose 10 % infusion   IntraVENous Continuous PRN    insulin lispro (HUMALOG) injection vial 0-4 Units  0-4 Units SubCUTAneous TID WC    insulin lispro (HUMALOG) injection vial 0-4 Units  0-4 Units SubCUTAneous Nightly    gentamicin (GARAMYCIN) 0.1 % cream   Topical PRN    prochlorperazine (COMPAZINE) injection 5 mg  5 mg IntraVENous Q6H PRN       Labs:  ABG Invalid input(s): \"ABG\"     CBC Recent Labs     03/05/24  0050 03/05/24  1422 03/06/24  0039   WBC 19.7* 17.3* 12.9*   HGB 6.8* 8.5* 8.2*   HCT 22.1* 27.8* 25.6*    177 168   MCV 92.5 93.9 91.1   MCH 28.5 28.7 29.2          Metabolic  Panel Recent Labs     03/04/24  0638 03/05/24  0050 03/05/24 1422 03/06/24  0039   * 136 138 136  137   K 3.8 4.6 4.2 4.0  4.0    101 102 101  101   CO2 29 28 28 25  28   BUN 23* 25* 26* 26*  25*   MG 1.8 1.8  --  1.8   PHOS 1.8* 4.5 4.0 4.2  4.0   ALT 9*  --   --  11*          Pertinent Labs                Will Andersen PA-C  3/6/2024

## 2024-03-06 NOTE — PROGRESS NOTES
Comprehensive Nutrition Assessment    Type and Reason for Visit: Reassess    Nutrition Recommendations/Plan:     Resume TF via post-pyloric DHT of Vital 1.5 @ 20 mL/hr, advance by 10 mL q 4 hours to goal of 50 mL/hr.  Flush with 30 mL H2O flushes q 4 hours  Flush adjustments per nephrology    Continue clears and advance as tolerated.  Pt hates all ONS, do not order.         Malnutrition Assessment:  Malnutrition Status:  Moderate malnutrition (02/23/24 1431)    Context:  Chronic Illness     Findings of the 6 clinical characteristics of malnutrition:  Energy Intake:  Unable to assess  Weight Loss:  Greater than 7.5% over 3 months     Body Fat Loss:  Mild body fat loss Triceps   Muscle Mass Loss:  Unable to assess    Fluid Accumulation:  No significant fluid accumulation     Strength:  Not Performed       Nutrition Assessment:    Past Medical History:   Diagnosis Date    Asthma     CAD (coronary artery disease)     COPD (chronic obstructive pulmonary disease) ([facility])     PCP manages    CVA (cerebral vascular accident) ([facility]) 11/01/2023    Patient presented with right-sided weakness.  Per MRI of the brain on 11/1/2023, acute versus subacute embolic infarctions involving the left MCA territory and right PICA territory    Diabetes mellitus, type 2 ([facility])     ESRD on peritoneal dialysis ([facility])     Eduardo (Tammie-PD nurse) [facility] [phone]- was on hemodialysis M-W-F Eduardo - now on PD    GERD (gastroesophageal reflux disease)     History of blood transfusion     Hyperlipidemia     Hypertension        Pt admitted with Hypomagnesemia [E83.42]  Vomiting [R11.10]  Prolonged Q-T interval on ECG [R94.31]  Intractable nausea and vomiting [R11.2]  GI following.    3/6: follow-up.  Tube feeds remain off, but taking/ tolerating clear liquids.  Remains with AMS (neuro consulted), but is more awake/alert today.    Only had a little jello when I saw her earlier today.  Diabetes making insulin adjustments - appreciate their help!  GI      Recent Labs     03/04/24  0638 03/05/24  0050 03/05/24  1422 03/06/24  0039   GLUCOSE 230* 277* 147* 320*  316*   BUN 23* 25* 26* 26*  25*   CREATININE 4.69* 4.70* 4.88* 4.72*  4.68*   * 136 138 136  137   K 3.8 4.6 4.2 4.0  4.0    101 102 101  101   CO2 29 28 28 25  28   CALCIUM 7.0* 7.1* 7.4* 7.7*  7.7*   PHOS 1.8* 4.5 4.0 4.2  4.0   MG 1.8 1.8  --  1.8         Recent Labs     03/03/24  1637 03/03/24  2039 03/04/24  0850 03/04/24  1121 03/04/24  1650 03/05/24  0820 03/05/24  1144 03/05/24  1723 03/05/24  2035 03/06/24  0751 03/06/24  1020 03/06/24  1208   POCGLU 261* 309* 299* 305* 148* 252* 212* 222* 277* 195* 174* 168*       Lab Results   Component Value Date/Time    LABA1C 5.5 03/01/2024 05:28 AM    LABA1C 5.9 12/14/2023 06:22 AM    LABA1C 6.6 11/02/2023 04:21 AM     03/01/2024 05:28 AM       Ernestina Lu RD  Available via [employer]

## 2024-03-06 NOTE — PROGRESS NOTES
[facility]  -PT/OT    Hx of Paroxysmal Atrial fibrillation, rate controlled  -stable, continue coreg, aspirin and crestor,  hold eliquis,     T2DM  -A1c 5.5  -holding home lantus for now due to compromised intakes  -continue insulin sliding scale and monitor finger stick glucose per hypoglycemic control     PTOT     palliative care consulted, complex multiple medical issues not clinically progressed, discuss goals of care     Full Code, high risk for decompensation, Transfer to [facility]       Code status: Full Code  Prophylaxis: Eliquis  Care Plan discussed with: Patient/family, nurse, CM  Anticipated Disposition: Greater than 48 hours     Principal Problem:    Vomiting  Active Problems:    Intractable nausea and vomiting    Type 2 diabetes with nephropathy (HCC)    Palliative care encounter    Altered mental status    Advanced care planning/counseling discussion    Hallucinations    Insomnia    Poorly controlled diabetes mellitus (HCC)  Resolved Problems:    * No resolved hospital problems. *        Vital Signs:    Last 24hrs VS reviewed since prior progress note. Most recent are:  /61   Pulse 76   Temp 97.7 °F (36.5 °C) (Oral)   Resp 14   Ht 1.6 m (5' 3\")   Wt 73.5 kg (162 lb 0.6 oz)   SpO2 100%   BMI 28.70 kg/m²        Intake/Output Summary (Last 24 hours) at 3/6/2024 0903  Last data filed at 3/6/2024 0600  Gross per 24 hour   Intake 422 ml   Output 880 ml   Net -458 ml          Physical Examination:     I had a face to face encounter with this patient and independently examined them on 3/6/2024 as outlined below:          General : Alert, and well oriented, no acute distress,   HEENT: NGT in place, PEERL, EOMI, moist mucus membrane  Neck: supple, no JVD, no meningeal signs  Chest: Clear to auscultation bilaterally   CVS: S1 S2 heard, Capillary refill less than 2 seconds  Abd: soft/ non tender, non distended, BS physiological,   Ext: no clubbing, no cyanosis, no edema, brisk 2+ DP pulses  Neuro/Psych:  Conscious and alert,  oriented to time, place and person, CN II-XII grossly normal, strength 5/5  Skin: warm         Data Review:    Review and/or order of clinical lab test  Review and/or order of tests in the radiology section of CPT  Review and/or order of tests in the medicine section of CPT    I have independently reviewed and interpreted patient's lab and all other diagnostic data    Notes reviewed from all clinical/nonclinical/nursing services involved in patient's clinical care. Care coordination discussions were held with appropriate clinical/nonclinical/ nursing providers based on care coordination needs.     Labs:     Recent Labs     03/05/24 1422 03/06/24  0039   WBC 17.3* 12.9*   HGB 8.5* 8.2*   HCT 27.8* 25.6*    168       Recent Labs     03/04/24 0638 03/05/24  0050 03/05/24 1422 03/06/24 0039   * 136 138 136  137   K 3.8 4.6 4.2 4.0  4.0    101 102 101  101   CO2 29 28 28 25  28   BUN 23* 25* 26* 26*  25*   MG 1.8 1.8  --  1.8   PHOS 1.8* 4.5 4.0 4.2  4.0       Recent Labs     03/04/24 0638 03/06/24  0039   ALT 9* 11*   GLOB 4.1* 4.4*       No results for input(s): \"INR\", \"APTT\" in the last 72 hours.    Invalid input(s): \"PTP\"   Recent Labs     03/05/24  0050   TIBC 94*      No results found for: \"FOL\", \"RBCF\"   No results for input(s): \"PH\", \"PCO2\", \"PO2\" in the last 72 hours.  No results for input(s): \"CPK\" in the last 72 hours.    Invalid input(s): \"CPKMB\", \"CKNDX\", \"TROIQ\"  Lab Results   Component Value Date/Time    CHOL 123 12/14/2023 06:22 AM    HDL 38 12/14/2023 06:22 AM     Lab Results   Component Value Date/Time    GLUCPOC 159 01/09/2023 03:20 PM    GLUCPOC 166 01/09/2023 03:05 PM           Medications Reviewed:     Current Facility-Administered Medications   Medication Dose Route Frequency    0.9 % sodium chloride infusion   IntraVENous PRN    insulin NPH (HumuLIN N;NovoLIN N) injection vial 9 Units  9 Units SubCUTAneous 2 times per day    dianeal lo-brandi

## 2024-03-06 NOTE — PROGRESS NOTES
[facility]AN[facility]FF[facility]      Nephrology  Note  Galilea Aguilar  Date of Admission : 2/21/2024    CC:  Follow up for ESRD       Assessment and Plan     ESRD on PD   - on CCPD 2.5% x 3 bags  - continue with current prescription  UF 1230 cc  - No chemistries obtained this AM  - Daily labs    Hypo-K, mag, Phos,ca  - 2/2 poor nutrition  - Last K, phos, cor Ca all stable  - replete PRN    Recent PD peritonitis  - cx positive for staph epi  -Completed IV antibiotics  -No further IP antibiotics planned    Hypoxia   - pulmonary following  - CT: Bilateral lower lobe atelectasis. Minimal pulmonary edema.   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 Renal cysts  -S/p post pylori feeding tube     Anemia of CKD  -Continue LIS 20K  - recommend transfusion for Hgb <7  - repeat iron stores stable with elevated Ferritin- no iron    Encephalopathy: resolved  Prolonged QT interval   HTN   DM  COPD  CAD       Interval History:  Patient seen and examined this AM. Continues with good PD UF with current prescription. TF restarted overnight. On RA with good O2 sat this AM. She denies discomfort. CT results noted.    Current Medications: all current  Medications have been eviewed in EPIC  Review of Systems: A comprehensive review of systems was negative.    Objective:  Vitals:    Vitals:    03/06/24 0600 03/06/24 0640 03/06/24 0806 03/06/24 0830   BP: 132/61 (!) 142/71  132/61   Pulse: 67 77 75 76   Resp: 15 17 15 14   Temp:  97.1 °F (36.2 °C)  97.7 °F (36.5 °C)   TempSrc:  Oral  Oral   SpO2: 100%  96% 100%   Weight:       Height:         Intake and Output:  No intake/output data recorded.  03/04 1901 - 03/06 0700  In: 1273.3   Out: 1662     Physical Examination:  General: Alert and conversant this  AM  HEENT Dobhoff in place- TF infusing  Resp:  Diminished posteriorly, no wheezing, on RA  CV:  Regular Rate/ Rhythm,  no  LE edema  GI:  Soft, NT, PD exit site dressing D/I.  Neurologic:  Oriented person/ place      LABS:  Recent Labs     03/06/24  0039 03/05/24 1422 03/05/24  0050 03/04/24  0638     137 138 136 135*   K 4.0  4.0 4.2 4.6 3.8     101 102 101 101   CO2 25  28 28 28 29   BUN 26*  25* 26* 25* 23*   CREATININE 4.72*  4.68* 4.88* 4.70* 4.69*   CALCIUM 7.7*  7.7* 7.4* 7.1* 7.0*   LABALBU 1.5*  1.5* 1.5*  --  1.3*   PHOS 4.2  4.0 4.0 4.5 1.8*   MG 1.8  --  1.8 1.8       Recent Labs     03/06/24 0039 03/05/24 1422 03/05/24  0050   WBC 12.9* 17.3* 19.7*   HGB 8.2* 8.5* 6.8*   HCT 25.6* 27.8* 22.1*    177 169       No results for input(s): \"WINSTON\", \"KU\", \"CLU\", \"CREAU\" in the last 720 hours.    Invalid input(s): \"PROU\"  No results found for: \"SDES\"  No components found for: \"CULT\"      Review of Medical Records: I have reviewed all pertinent labs, chart notes, microbiology data, radiology imaging this patient.  Medications list personally reviewed.  No change in PMH ,family and social history from Consult note.      DAYA Cao - NP  [facility] [address], [place] [address]  Phone: [phone]   [phone]

## 2024-03-06 NOTE — DIABETES MGMT
[facility]  DIABETES MANAGEMENT CONSULT    Re-consulted by Provider for advanced nursing evaluation and care for inpatient blood glucose management.    Evaluation and Action Plan   This 68 year old AA female was admitted 2/21/24 with nausea & vomiting. Patient has CKD and is on PD at home; this has continued in-house. Usual home insulin dosing consists of 10 units of total insulin per day. Patient was receiving TF for a few days, along with corrective insulin, with Bgs rising into the 200-300s. TF was stopped 3/4/24 in order to address pulmonary edema. PD useful in pulling extra fluid, but Bps were lowish. Given one-time dose of steroid & a pint of PRBCs. TF will not be restarted & oral intake is being encouraged. Eating small amounts. Will continue current insulin dosing.    Management Rationale Action Plan   Medication   Basal needs Based on  []        Blood glucose pattern  []        Weight & BMI  []        Kidney dysfunction  [x]        Home diabetes regimen     Continue NPH insulin 9 units twice daily    Nutritional needs Based on  []        Blood glucose pattern  []        CHO load  []        Enteral feeding CHO load  []        TPN/PPN dextrose load     Not until better oral intake   Corrective insulin Based on sensitivity  [x]        Low   []        Medium  []        High      Additional orders        Diabetes Discharge Plan   Medication  TBD     Referral  []        Outpatient diabetes education   Additional orders            Initial Presentation   Galilea Aguilar is a 68 y.o. female admitted 2/21/24 from ED after experiencing vomiting and left side/flank pain - ESRD-PD dependant. Recent admission 1/25-2/5 for abd pain/ sepsis secondary to peritonitis and was d/c'd with abx. Since discharged persistent N/V.   CT: Large right retroperitoneal mass though has been present on several CT scans since 2021      HX:  Past Medical History:   Diagnosis Date    Asthma     CAD (coronary artery

## 2024-03-07 LAB
COMMENT:: NORMAL
ERYTHROCYTE [DISTWIDTH] IN BLOOD BY AUTOMATED COUNT: 19.8 % (ref 11.5–14.5)
GLUCOSE BLD STRIP.AUTO-MCNC: 170 MG/DL (ref 65–117)
GLUCOSE BLD STRIP.AUTO-MCNC: 198 MG/DL (ref 65–117)
GLUCOSE BLD STRIP.AUTO-MCNC: 241 MG/DL (ref 65–117)
GLUCOSE BLD STRIP.AUTO-MCNC: 279 MG/DL (ref 65–117)
HCT VFR BLD AUTO: 25.4 % (ref 35–47)
HGB BLD-MCNC: 8 G/DL (ref 11.5–16)
MCH RBC QN AUTO: 29.5 PG (ref 26–34)
MCHC RBC AUTO-ENTMCNC: 31.5 G/DL (ref 30–36.5)
MCV RBC AUTO: 93.7 FL (ref 80–99)
NRBC # BLD: 0.02 K/UL (ref 0–0.01)
NRBC BLD-RTO: 0.2 PER 100 WBC
PLATELET # BLD AUTO: 147 K/UL (ref 150–400)
PMV BLD AUTO: 10.7 FL (ref 8.9–12.9)
RBC # BLD AUTO: 2.71 M/UL (ref 3.8–5.2)
SERVICE CMNT-IMP: ABNORMAL
SPECIMEN HOLD: NORMAL
WBC # BLD AUTO: 9.7 K/UL (ref 3.6–11)

## 2024-03-07 PROCEDURE — 2700000000 HC OXYGEN THERAPY PER DAY

## 2024-03-07 PROCEDURE — 2580000003 HC RX 258: Performed by: NURSE PRACTITIONER

## 2024-03-07 PROCEDURE — 6370000000 HC RX 637 (ALT 250 FOR IP): Performed by: NURSE PRACTITIONER

## 2024-03-07 PROCEDURE — 6370000000 HC RX 637 (ALT 250 FOR IP): Performed by: CLINICAL NURSE SPECIALIST

## 2024-03-07 PROCEDURE — 6360000002 HC RX W HCPCS: Performed by: NURSE PRACTITIONER

## 2024-03-07 PROCEDURE — A4216 STERILE WATER/SALINE, 10 ML: HCPCS | Performed by: NURSE PRACTITIONER

## 2024-03-07 PROCEDURE — 97535 SELF CARE MNGMENT TRAINING: CPT

## 2024-03-07 PROCEDURE — 97530 THERAPEUTIC ACTIVITIES: CPT

## 2024-03-07 PROCEDURE — 82962 GLUCOSE BLOOD TEST: CPT

## 2024-03-07 PROCEDURE — C9113 INJ PANTOPRAZOLE SODIUM, VIA: HCPCS | Performed by: NURSE PRACTITIONER

## 2024-03-07 PROCEDURE — 2060000000 HC ICU INTERMEDIATE R&B

## 2024-03-07 PROCEDURE — 6370000000 HC RX 637 (ALT 250 FOR IP): Performed by: STUDENT IN AN ORGANIZED HEALTH CARE EDUCATION/TRAINING PROGRAM

## 2024-03-07 PROCEDURE — 94640 AIRWAY INHALATION TREATMENT: CPT

## 2024-03-07 PROCEDURE — 85027 COMPLETE CBC AUTOMATED: CPT

## 2024-03-07 PROCEDURE — 90945 DIALYSIS ONE EVALUATION: CPT

## 2024-03-07 PROCEDURE — 36415 COLL VENOUS BLD VENIPUNCTURE: CPT

## 2024-03-07 PROCEDURE — 97116 GAIT TRAINING THERAPY: CPT

## 2024-03-07 PROCEDURE — 6370000000 HC RX 637 (ALT 250 FOR IP): Performed by: INTERNAL MEDICINE

## 2024-03-07 RX ORDER — LANSOPRAZOLE 30 MG/1
15 TABLET, ORALLY DISINTEGRATING, DELAYED RELEASE ORAL
Status: DISCONTINUED | OUTPATIENT
Start: 2024-03-08 | End: 2024-03-16 | Stop reason: HOSPADM

## 2024-03-07 RX ADMIN — SODIUM CHLORIDE, SODIUM LACTATE, CALCIUM CHLORIDE, MAGNESIUM CHLORIDE AND DEXTROSE 6000 ML: 2.5; 538; 448; 18.3; 5.08 INJECTION, SOLUTION INTRAPERITONEAL at 17:59

## 2024-03-07 RX ADMIN — MONTELUKAST 10 MG: 10 TABLET, FILM COATED ORAL at 21:04

## 2024-03-07 RX ADMIN — APIXABAN 2.5 MG: 2.5 TABLET, FILM COATED ORAL at 12:16

## 2024-03-07 RX ADMIN — Medication 5000 UNITS: at 12:12

## 2024-03-07 RX ADMIN — ARFORMOTEROL TARTRATE: 15 SOLUTION RESPIRATORY (INHALATION) at 21:42

## 2024-03-07 RX ADMIN — INSULIN HUMAN 9 UNITS: 100 INJECTION, SUSPENSION SUBCUTANEOUS at 12:14

## 2024-03-07 RX ADMIN — ARFORMOTEROL TARTRATE: 15 SOLUTION RESPIRATORY (INHALATION) at 08:00

## 2024-03-07 RX ADMIN — APIXABAN 2.5 MG: 2.5 TABLET, FILM COATED ORAL at 21:06

## 2024-03-07 RX ADMIN — Medication 3 MG: at 21:03

## 2024-03-07 RX ADMIN — ROSUVASTATIN CALCIUM 10 MG: 10 TABLET, FILM COATED ORAL at 21:04

## 2024-03-07 RX ADMIN — Medication 100 MG: at 12:12

## 2024-03-07 RX ADMIN — SODIUM BICARBONATE 650 MG: 650 TABLET ORAL at 12:13

## 2024-03-07 RX ADMIN — CARVEDILOL 6.25 MG: 6.25 TABLET, FILM COATED ORAL at 12:15

## 2024-03-07 RX ADMIN — SODIUM CHLORIDE, SODIUM LACTATE, CALCIUM CHLORIDE, MAGNESIUM CHLORIDE AND DEXTROSE 6000 ML: 2.5; 538; 448; 18.3; 5.08 INJECTION, SOLUTION INTRAPERITONEAL at 18:00

## 2024-03-07 RX ADMIN — GENTAMICIN SULFATE: 1 CREAM TOPICAL at 18:00

## 2024-03-07 RX ADMIN — CARVEDILOL 6.25 MG: 6.25 TABLET, FILM COATED ORAL at 18:55

## 2024-03-07 RX ADMIN — PANTOPRAZOLE SODIUM 40 MG: 40 INJECTION, POWDER, FOR SOLUTION INTRAVENOUS at 12:13

## 2024-03-07 RX ADMIN — DULOXETINE HYDROCHLORIDE 20 MG: 20 CAPSULE, DELAYED RELEASE ORAL at 12:13

## 2024-03-07 RX ADMIN — DULOXETINE HYDROCHLORIDE 20 MG: 20 CAPSULE, DELAYED RELEASE ORAL at 21:05

## 2024-03-07 RX ADMIN — ASPIRIN 81 MG CHEWABLE TABLET 81 MG: 81 TABLET CHEWABLE at 12:12

## 2024-03-07 RX ADMIN — INSULIN HUMAN 9 UNITS: 100 INJECTION, SUSPENSION SUBCUTANEOUS at 21:03

## 2024-03-07 RX ADMIN — Medication 10 ML: at 21:04

## 2024-03-07 RX ADMIN — SODIUM BICARBONATE 650 MG: 650 TABLET ORAL at 21:04

## 2024-03-07 RX ADMIN — INSULIN LISPRO 1 UNITS: 100 INJECTION, SOLUTION INTRAVENOUS; SUBCUTANEOUS at 18:57

## 2024-03-07 ASSESSMENT — PAIN DESCRIPTION - FREQUENCY: FREQUENCY: CONTINUOUS

## 2024-03-07 ASSESSMENT — PAIN DESCRIPTION - PAIN TYPE
TYPE: CHRONIC PAIN
TYPE: CHRONIC PAIN

## 2024-03-07 ASSESSMENT — PAIN SCALES - GENERAL
PAINLEVEL_OUTOF10: 8
PAINLEVEL_OUTOF10: 0
PAINLEVEL_OUTOF10: 3

## 2024-03-07 ASSESSMENT — PAIN DESCRIPTION - DESCRIPTORS
DESCRIPTORS: PATIENT UNABLE TO DESCRIBE
DESCRIPTORS: ACHING

## 2024-03-07 ASSESSMENT — PAIN DESCRIPTION - LOCATION
LOCATION: BACK;THROAT
LOCATION: BACK

## 2024-03-07 ASSESSMENT — PAIN DESCRIPTION - ORIENTATION: ORIENTATION: LEFT;UPPER

## 2024-03-07 ASSESSMENT — PAIN - FUNCTIONAL ASSESSMENT: PAIN_FUNCTIONAL_ASSESSMENT: PREVENTS OR INTERFERES SOME ACTIVE ACTIVITIES AND ADLS

## 2024-03-07 ASSESSMENT — PAIN DESCRIPTION - ONSET: ONSET: ON-GOING

## 2024-03-07 NOTE — FLOWSHEET NOTE
Peritoneal Dialysis Disconnection / [phone]    Primary RN SBAR: Romana, RN  Patient Education: access/site care    Hepatitis B Surface Ag   Date/Time Value Ref Range Status   02/26/2024 04:19 AM <0.10 Index Final     Hep B S Ab   Date/Time Value Ref Range Status   02/29/2024 07:51 AM <3.10 mIU/mL Final       03/07/24 0338   Peritoneal Dialysis Catheter Mid lower abdomen   No placement date or time found.   Catheter Location: Mid lower abdomen   Status Deaccessed   Site Condition Clean, dry, intact   Dressing Status Clean, dry & intact   Dressing Gauze   Date of Last Dressing Change 03/06/24   Dialysis Type Continuous cycling   Exit Site Condition excellent   Catheter Care Given Yes   Post-Treatment (Cycler)   Average Dwell Time (Hours:Minutes) 1:23   Lost Dwell Time (Hours:Minutes) 0:02   Effluent Appearance Clear;Yellow   PD Output (mL) 1230 mL  ( ml + TUF 1005 ml =1230)     Miguelangel RN at bedside to disconnect pt from CCPD tx. Orders, consent, pt, and code status confirmed. Tx completed. Used cassette and bags discarded. Education and pre/post report given to primary RN.    Net fluid loss: 1230 ml

## 2024-03-07 NOTE — PROGRESS NOTES
Pulmonary, Critical Care, and Sleep Medicine~Progress Note    Name: Galilea Aguilar MRN: [id number]   : 1955 Hospital: [facility]   Date: 3/7/2024 11:11 AM Admission: 2024     Impression Plan   Acute hypoxic respiratory failure  Abnormal chest x-ray, suspect volume overload, but cannot rule out infection at this time.  End-stage renal disease on PD.  Recent peritonitis  Reported history of COPD  Recent history of prolonged Qtc  Electrolyte disturbances  Noted retroperitoneal mass like lesion suspected to be lipoma Continue bronchodilators  At risk for volume overload  Query vocal cord dysfunction? This can be looked into on outpatient if continues to be a problem   O2 titration above 90%  s/p blood products   We will be available again to see if needed      Daily Progression:    3/7  On room air  No distress     CT chest   IMPRESSION:  Small bilateral pleural effusions with bilateral lower lobe atelectasis, right greater than left. Minimal pulmonary edema.    3/6  Procal 9.16  Breathing much better today     3/5  Consult Note requested by hospitalist service.    Patient was initially hospitalized on 2024 for generalized abdominal pain and intermittent chest pains with fatigue over a week.  Noted to have some diarrhea.  Diagnosed with peritonitis.  She does have end-stage renal disease and is on PD.  She was discharged in early February and readmitted 2024 for she was noted to have a prolonged QTc at that time as well.  GI assessed her and felt that she possibly had gastroparesis.  She does have a retroperitoneal mass possibly lipoma may be contributing as well.  It is reported that she has a history of COPD and started experiencing more shortness of breath after volume removal yesterday.  She did have PFTs and August that showed severe reduction in vital capacity that appeared to be a restrictive ventilatory defect and had flow-volume loop consistent with restrictive pattern.

## 2024-03-07 NOTE — PROGRESS NOTES
Clinical Pharmacy Note: IV to PO Automatic Conversion  Please note: Galilea Aguilar’s medication- pantoprazole has been changed from IV to PO lansoprazole based on the following critiera:    Patient is tolerating oral medications  Patient is tolerating a diet more advanced than clear liquids  Patient is not requiring vasopressors    This IV to PO conversion is based on the P&T approved automatic conversion policy for eligible patients.  Please call with questions.

## 2024-03-07 NOTE — PROGRESS NOTES
Hospitalist Progress Note  Donell Lyon MD  Answering service: [phone] OR [phone] from in house phone        Date of Service:  3/7/2024  NAME:  Galilea Aguilar  :  1955  MRN:  [id number]      Admission Summary:     HPI: \"Galilea Aguilar is a 68 y.o. female with a history of CAD, COPD, CVA, diabetes, ESRD on peritoneal dialysis who presented to the ED with chief complaint of vomiting and left side/flank pain. In the ED, labs consistent with known renal dysfunction. QTc was found to be prolonged (554). CT demonstrated a large right retroperitoneal mass though has been present on several CT scans since . Patient treated with IV Valium for nausea and referred to hospitalists for further management.      Patient was seen and examined this morning in the ED, she was lying in bed in no acute distress -though did notice her having \"dry heaves\". Patient was recently admitted from  - 2024 for generalized abdominal pain, diagnosed with severe sepsis secondary to peritonitis and was discharged on vancomycin to be given through her peritoneal dialysis fluid and has also been prescribed Diflucan daily for the next month while on antibiotics.  She reports that since she has been discharged, she has had nausea and vomiting that has been persistent and has had difficulty keeping anything down.  Denies any headaches or dizziness, no chest pain or pressure, no shortness of breath or coughing.  Reports no diarrhea or constipation at home and has no abdominal pain.         Interval history / Subjective:     Follow up Nausea, vomiting  No nausea  Was able to feed her some food. She liked whole milk  She wants noodles and vegetables     Assessment & Plan:     Intractable nausea and vomiting possible due to gastroparesis   Recent Peritonitis, completed abx treatment  Retroperitoneal Mass-possible lipoma  -CT abdomen no acute  planning/counseling discussion    Hallucinations    Insomnia    Poorly controlled diabetes mellitus (HCC)  Resolved Problems:    * No resolved hospital problems. *        Vital Signs:    Last 24hrs VS reviewed since prior progress note. Most recent are:  /65   Pulse 78   Temp 98 °F (36.7 °C) (Oral)   Resp 14   Ht 1.6 m (5' 3\")   Wt 73.5 kg (162 lb 0.6 oz)   SpO2 100%   BMI 28.70 kg/m²        Intake/Output Summary (Last 24 hours) at 3/7/2024 1419  Last data filed at 3/7/2024 0600  Gross per 24 hour   Intake 240 ml   Output 1230 ml   Net -990 ml          Physical Examination:     I had a face to face encounter with this patient and independently examined them on 3/7/2024 as outlined below:          General : Alert, and well oriented, no acute distress,   HEENT: NGT in place, PEERL, EOMI, moist mucus membrane  Neck: supple, no JVD, no meningeal signs  Chest: Clear to auscultation bilaterally   CVS: S1 S2 heard, Capillary refill less than 2 seconds  Abd: soft/ non tender, non distended, BS physiological,   Ext: no clubbing, no cyanosis, no edema, brisk 2+ DP pulses  Neuro/Psych: Conscious and alert,  oriented to time, place and person, CN II-XII grossly normal, strength 5/5  Skin: warm         Data Review:    Review and/or order of clinical lab test  Review and/or order of tests in the radiology section of CPT  Review and/or order of tests in the medicine section of CPT    I have independently reviewed and interpreted patient's lab and all other diagnostic data    Notes reviewed from all clinical/nonclinical/nursing services involved in patient's clinical care. Care coordination discussions were held with appropriate clinical/nonclinical/ nursing providers based on care coordination needs.     Labs:     Recent Labs     03/06/24  0039 03/07/24  0313   WBC 12.9* 9.7   HGB 8.2* 8.0*   HCT 25.6* 25.4*    147*       Recent Labs     03/05/24  0050 03/05/24  1422 03/06/24  0039    138 136  137   K

## 2024-03-07 NOTE — PLAN OF CARE
Problem: Physical Therapy - Adult  Goal: By Discharge: Performs mobility at highest level of function for planned discharge setting.  See evaluation for individualized goals.  Description: FUNCTIONAL STATUS PRIOR TO ADMISSION: Patient was modified independent using a rolling walker for functional mobility, wheelchair for longer distances. Pt reported a fall a few days prior to this admission    HOME SUPPORT PRIOR TO ADMISSION: The patient lived with family and required assist for some ADLs.    Physical Therapy Goals  Initiated 2/23/2024, reassessed and remain appropriate 03/04/24   1.  Patient will move from supine to sit and sit to supine and roll side to side in bed with modified independence within 7 day(s).    2.  Patient will perform sit to stand with modified independence within 7 day(s).  3.  Patient will transfer from bed to chair and chair to bed with modified independence using the least restrictive device within 7 day(s).  4.  Patient will ambulate with supervision/set-up for 50 feet with the least restrictive device within 7 day(s).   5.  Patient will ascend/descend 4 stairs with single handrail(s) with supervision/set-up within 7 day(s).    Outcome: Progressing   PHYSICAL THERAPY TREATMENT    Patient: Galilea Aguilar (68 y.o. female)  Date: 3/7/2024  Diagnosis: Hypomagnesemia [E83.42]  Vomiting [R11.10]  Prolonged Q-T interval on ECG [R94.31]  Intractable nausea and vomiting [R11.2] Vomiting      Precautions: Contact Precautions, Fall Risk                      ASSESSMENT:  Patient continues to benefit from skilled PT services and is progressing towards goals. Pt remains limited by impaired by impaired standing balance and fatigue. That said she was able to make progress, amb further and with less assist today. She amb a total of 50 feet with one seated rest break (30 feet then 20 feet) using a rolling walker with contact guard. Post amb she remained up to the chair. Disposition depending on progress,  sitting   Pain Intervention(s):   repositioning and provided a waffle cushion.    Activity Tolerance:   Good and requires rest breaks    After treatment:   Patient left in no apparent distress sitting up in chair, Call bell within reach, and Bed/ chair alarm activated      COMMUNICATION/EDUCATION:   The patient's plan of care was discussed with: registered nurse    Patient Education  Education Provided: Role of Therapy;Plan of Care;Fall Prevention Strategies  Education Method: Verbal  Barriers to Learning: None  Education Outcome: Verbalized understanding;Continued education needed      JEANETTE STOKES, PT  Minutes: 29

## 2024-03-07 NOTE — PLAN OF CARE
Problem: Occupational Therapy - Adult  Goal: By Discharge: Performs self-care activities at highest level of function for planned discharge setting.  See evaluation for individualized goals.  Description: FUNCTIONAL STATUS PRIOR TO ADMISSION:  Patient lives with her daughter who assists with bathing and lower body ADLs PRN, Mod I with RW for functional mobility, receiving  services.    Occupational Therapy Goals:  Initiated 2/23/2024: WEEKLY REASSESSMENT 03/04/2024  1.  Patient will perform grooming at the sink with Supervision within 7 day(s). (Cont. 03/04)  2.  Patient will perform bathing with Minimal Assist within 7 day(s). (Cont. 03/04)  3.  Patient will perform upper and lower body dressing with Supervision & AE PRN within 7 day(s). (Cont. 03/04)  4.  Patient will perform toilet transfers with Supervision  within 7 day(s). (Cont. 03/04)  5.  Patient will perform all aspects of toileting with Supervision within 7 day(s). (Cont. 03/04)  6.  Patient will participate in upper extremity therapeutic exercise/activities with Supervision for 5 minutes within 7 day(s). (Cont. 03/04)     7.  Patient will utilize energy conservation techniques during functional activities with verbal and visual cues within 7 day(s). (Cont. 03/04)    Occupational Therapy Goals:  Initiated 2/23/2024  1.  Patient will perform grooming at the sink with Supervision within 7 day(s).  2.  Patient will perform bathing with Minimal Assist within 7 day(s).  3.  Patient will perform upper and lower body dressing with Supervision & AE PRN within 7 day(s).  4.  Patient will perform toilet transfers with Supervision  within 7 day(s).  5.  Patient will perform all aspects of toileting with Supervision within 7 day(s).  6.  Patient will participate in upper extremity therapeutic exercise/activities with Supervision for 5 minutes within 7 day(s).    7.  Patient will utilize energy conservation techniques during functional activities with verbal and  Tolerance:   Fair , requires frequent rest breaks, and SpO2 stable on room air  Please refer to the flowsheet for vital signs taken during this treatment.    After treatment:   Patient left in no apparent distress in bed, Call bell within reach, Bed/ chair alarm activated, and Side rails x3    COMMUNICATION/EDUCATION:   The patient's plan of care was discussed with: registered nurse    Patient Education  Education Given To: Patient  Education Provided: ADL Adaptive Strategies;Transfer Training;Fall Prevention Strategies;Energy Conservation  Education Method: Demonstration;Verbal;Teach Back  Barriers to Learning: Cognition  Education Outcome: Verbalized understanding;Continued education needed    Thank you for this referral.  Caitlin Bledsoe OT  Minutes: 31

## 2024-03-07 NOTE — PLAN OF CARE
Problem: Discharge Planning  Goal: Discharge to home or other facility with appropriate resources  3/7/2024 0052 by Bessie Mcintosh RN  Outcome: Progressing  Flowsheets (Taken 3/6/2024 2044)  Discharge to home or other facility with appropriate resources:   Identify barriers to discharge with patient and caregiver   Identify discharge learning needs (meds, wound care, etc)  3/6/2024 1308 by Neva Salazar RN  Outcome: Progressing     Problem: Pain  Goal: Verbalizes/displays adequate comfort level or baseline comfort level  3/7/2024 0052 by Bessie Mcintosh RN  Outcome: Progressing  3/6/2024 1308 by Neva Salazar RN  Outcome: Progressing     Problem: Safety - Adult  Goal: Free from fall injury  3/7/2024 0052 by Bessie Mcintosh RN  Outcome: Progressing  3/6/2024 1308 by Neva Salazar RN  Outcome: Progressing     Problem: Chronic Conditions and Co-morbidities  Goal: Patient's chronic conditions and co-morbidity symptoms are monitored and maintained or improved  3/7/2024 0052 by Bessie Mcintosh RN  Outcome: Progressing  Flowsheets (Taken 3/6/2024 2044)  Care Plan - Patient's Chronic Conditions and Co-Morbidity Symptoms are Monitored and Maintained or Improved: Monitor and assess patient's chronic conditions and comorbid symptoms for stability, deterioration, or improvement  3/6/2024 1308 by Neva Salazar RN  Outcome: Progressing     Problem: Nutrition Deficit:  Goal: Optimize nutritional status  3/7/2024 0052 by Bessie Mcintosh RN  Outcome: Progressing  3/6/2024 1308 by Neva Salazar RN  Outcome: Progressing     Problem: Physical Therapy - Adult  Goal: By Discharge: Performs mobility at highest level of function for planned discharge setting.  See evaluation for individualized goals.  Description: FUNCTIONAL STATUS PRIOR TO ADMISSION: Patient was modified independent using a rolling walker for functional mobility, wheelchair for longer distances. Pt reported a fall a few days prior to this admission    HOME SUPPORT

## 2024-03-07 NOTE — FLOWSHEET NOTE
CCPD Connection: 03/07/24 1800   Vitals   BP (!) 134/53   Temp 97.9 °F (36.6 °C)   Pulse 85   Respirations 16   SpO2 99 %   Observations & Evaluations   Level of Consciousness 0   Oriented X 4   Heart Rhythm Regular   Respiratory Quality/Effort Unlabored   O2 Device None (Room air)   Bilateral Breath Sounds Diminished   Skin Condition/Temp Warm;Dry   Appetite Fair   Abdomen Inspection Obese;Rounded;Soft   Edema None   Peritoneal Dialysis Catheter Mid lower abdomen   No placement date or time found.   Catheter Location: Mid lower abdomen   Status Accessed   Site Condition Clean, dry, intact   Dressing Status New dressing applied   Dressing Gauze   Date of Last Dressing Change 03/07/24   Dialysis Type Continuous cycling   Exit Site Condition excellent   Catheter Care Given   (one minute alcavis scrub followed by one minute alcavis soak)   Cycler   Verification of Prescription CCPD   Informed Consent    (chronic consent applies)   Total Volume Programmed 32096 mL   Therapy Time (Hours:Minutes) 9:30   Cycler Type Jung HomeChoice   Fill Volume 2500 mL   Last Fill Volume 0 mL   Dextrose Setting Same (Nonextraneal)   I Drain Alarm 0 mL   Number of Cycles 5   Bag Volume 6000 mL   Number of Bags Used 3   Dianeal Solution   (Dextrose 2.5% in 6000 mL)     Hepatitis B Surface Ag   Date/Time Value Ref Range Status   02/26/2024 04:19 AM <0.10 Index Final     Hep B S Ab   Date/Time Value Ref Range Status   02/29/2024 07:51 AM <3.10 mIU/mL Final        Time Out (time): 1750  Primary RN SBAR: EVELYN Salazar, RN  Patient Education: Infection prevention  Comments: Remained present for completion of initial drain and initiation of first fill. Last fill added for specimen collection in AM.     Start time: 1810 (3/7/24)  Est end time: 0340 (3/8/24)

## 2024-03-08 ENCOUNTER — APPOINTMENT (OUTPATIENT)
Facility: HOSPITAL | Age: 69
End: 2024-03-08
Payer: MEDICARE

## 2024-03-08 LAB
ALBUMIN SERPL-MCNC: 1.4 G/DL (ref 3.5–5)
ALBUMIN/GLOB SERPL: 0.3 (ref 1.1–2.2)
ALP SERPL-CCNC: 158 U/L (ref 45–117)
ALT SERPL-CCNC: 20 U/L (ref 12–78)
ANION GAP SERPL CALC-SCNC: 9 MMOL/L (ref 5–15)
AST SERPL-CCNC: 24 U/L (ref 15–37)
BILIRUB DIRECT SERPL-MCNC: 0.2 MG/DL (ref 0–0.2)
BILIRUB SERPL-MCNC: 0.6 MG/DL (ref 0.2–1)
BUN SERPL-MCNC: 29 MG/DL (ref 6–20)
BUN/CREAT SERPL: 6 (ref 12–20)
CALCIUM SERPL-MCNC: 7.8 MG/DL (ref 8.5–10.1)
CHLORIDE SERPL-SCNC: 102 MMOL/L (ref 97–108)
CO2 SERPL-SCNC: 29 MMOL/L (ref 21–32)
CREAT SERPL-MCNC: 4.72 MG/DL (ref 0.55–1.02)
ERYTHROCYTE [DISTWIDTH] IN BLOOD BY AUTOMATED COUNT: 19.9 % (ref 11.5–14.5)
FOLATE SERPL-MCNC: 6.4 NG/ML
GLOBULIN SER CALC-MCNC: 4.6 G/DL (ref 2–4)
GLUCOSE BLD STRIP.AUTO-MCNC: 168 MG/DL (ref 65–117)
GLUCOSE BLD STRIP.AUTO-MCNC: 202 MG/DL (ref 65–117)
GLUCOSE BLD STRIP.AUTO-MCNC: 209 MG/DL (ref 65–117)
GLUCOSE BLD STRIP.AUTO-MCNC: 228 MG/DL (ref 65–117)
GLUCOSE BLD STRIP.AUTO-MCNC: 79 MG/DL (ref 65–117)
GLUCOSE SERPL-MCNC: 223 MG/DL (ref 65–100)
HCT VFR BLD AUTO: 29.4 % (ref 35–47)
HGB BLD-MCNC: 8.4 G/DL (ref 11.5–16)
MAGNESIUM SERPL-MCNC: 1.8 MG/DL (ref 1.6–2.4)
MCH RBC QN AUTO: 28.8 PG (ref 26–34)
MCHC RBC AUTO-ENTMCNC: 28.6 G/DL (ref 30–36.5)
MCV RBC AUTO: 100.7 FL (ref 80–99)
NRBC # BLD: 0 K/UL (ref 0–0.01)
NRBC BLD-RTO: 0 PER 100 WBC
PHOSPHATE SERPL-MCNC: 2.3 MG/DL (ref 2.6–4.7)
PLATELET # BLD AUTO: 191 K/UL (ref 150–400)
PMV BLD AUTO: 10.5 FL (ref 8.9–12.9)
POTASSIUM SERPL-SCNC: 3.3 MMOL/L (ref 3.5–5.1)
PROT SERPL-MCNC: 6 G/DL (ref 6.4–8.2)
RBC # BLD AUTO: 2.92 M/UL (ref 3.8–5.2)
SERVICE CMNT-IMP: ABNORMAL
SERVICE CMNT-IMP: NORMAL
SODIUM SERPL-SCNC: 140 MMOL/L (ref 136–145)
WBC # BLD AUTO: 10.9 K/UL (ref 3.6–11)

## 2024-03-08 PROCEDURE — 71045 X-RAY EXAM CHEST 1 VIEW: CPT

## 2024-03-08 PROCEDURE — 6370000000 HC RX 637 (ALT 250 FOR IP): Performed by: NURSE PRACTITIONER

## 2024-03-08 PROCEDURE — 85027 COMPLETE CBC AUTOMATED: CPT

## 2024-03-08 PROCEDURE — 82962 GLUCOSE BLOOD TEST: CPT

## 2024-03-08 PROCEDURE — 6360000002 HC RX W HCPCS: Performed by: INTERNAL MEDICINE

## 2024-03-08 PROCEDURE — 6370000000 HC RX 637 (ALT 250 FOR IP): Performed by: HOSPITALIST

## 2024-03-08 PROCEDURE — 84100 ASSAY OF PHOSPHORUS: CPT

## 2024-03-08 PROCEDURE — 6370000000 HC RX 637 (ALT 250 FOR IP): Performed by: CLINICAL NURSE SPECIALIST

## 2024-03-08 PROCEDURE — 6370000000 HC RX 637 (ALT 250 FOR IP): Performed by: STUDENT IN AN ORGANIZED HEALTH CARE EDUCATION/TRAINING PROGRAM

## 2024-03-08 PROCEDURE — 80048 BASIC METABOLIC PNL TOTAL CA: CPT

## 2024-03-08 PROCEDURE — 2060000000 HC ICU INTERMEDIATE R&B

## 2024-03-08 PROCEDURE — 80076 HEPATIC FUNCTION PANEL: CPT

## 2024-03-08 PROCEDURE — 6370000000 HC RX 637 (ALT 250 FOR IP): Performed by: INTERNAL MEDICINE

## 2024-03-08 PROCEDURE — 2580000003 HC RX 258: Performed by: NURSE PRACTITIONER

## 2024-03-08 PROCEDURE — 6360000002 HC RX W HCPCS: Performed by: NURSE PRACTITIONER

## 2024-03-08 PROCEDURE — 99232 SBSQ HOSP IP/OBS MODERATE 35: CPT | Performed by: CLINICAL NURSE SPECIALIST

## 2024-03-08 PROCEDURE — 94640 AIRWAY INHALATION TREATMENT: CPT

## 2024-03-08 PROCEDURE — 83735 ASSAY OF MAGNESIUM: CPT

## 2024-03-08 PROCEDURE — 36415 COLL VENOUS BLD VENIPUNCTURE: CPT

## 2024-03-08 RX ORDER — LOPERAMIDE HYDROCHLORIDE 2 MG/1
2 CAPSULE ORAL ONCE
Status: COMPLETED | OUTPATIENT
Start: 2024-03-08 | End: 2024-03-08

## 2024-03-08 RX ORDER — SODIUM CHLORIDE, SODIUM LACTATE, CALCIUM CHLORIDE, MAGNESIUM CHLORIDE AND DEXTROSE 2.5; 538; 448; 18.3; 5.08 G/100ML; MG/100ML; MG/100ML; MG/100ML; MG/100ML
6000 INJECTION, SOLUTION INTRAPERITONEAL
Status: DISCONTINUED | OUTPATIENT
Start: 2024-03-08 | End: 2024-03-16 | Stop reason: HOSPADM

## 2024-03-08 RX ADMIN — Medication 100 MG: at 09:26

## 2024-03-08 RX ADMIN — CARVEDILOL 6.25 MG: 6.25 TABLET, FILM COATED ORAL at 17:19

## 2024-03-08 RX ADMIN — GENTAMICIN SULFATE: 1 CREAM TOPICAL at 16:41

## 2024-03-08 RX ADMIN — LOPERAMIDE HYDROCHLORIDE 2 MG: 2 CAPSULE ORAL at 09:27

## 2024-03-08 RX ADMIN — INSULIN HUMAN 9 UNITS: 100 INJECTION, SUSPENSION SUBCUTANEOUS at 09:26

## 2024-03-08 RX ADMIN — Medication 10 ML: at 09:27

## 2024-03-08 RX ADMIN — APIXABAN 2.5 MG: 2.5 TABLET, FILM COATED ORAL at 09:26

## 2024-03-08 RX ADMIN — INSULIN HUMAN 18 UNITS: 100 INJECTION, SUSPENSION SUBCUTANEOUS at 17:19

## 2024-03-08 RX ADMIN — ACETAMINOPHEN 650 MG: 325 TABLET ORAL at 03:24

## 2024-03-08 RX ADMIN — Medication 5000 UNITS: at 09:26

## 2024-03-08 RX ADMIN — DIBASIC SODIUM PHOSPHATE, MONOBASIC POTASSIUM PHOSPHATE AND MONOBASIC SODIUM PHOSPHATE 2 TABLET: 852; 155; 130 TABLET ORAL at 21:57

## 2024-03-08 RX ADMIN — DULOXETINE HYDROCHLORIDE 20 MG: 20 CAPSULE, DELAYED RELEASE ORAL at 09:27

## 2024-03-08 RX ADMIN — Medication 10 ML: at 21:58

## 2024-03-08 RX ADMIN — LANSOPRAZOLE 15 MG: 30 TABLET, ORALLY DISINTEGRATING, DELAYED RELEASE ORAL at 06:39

## 2024-03-08 RX ADMIN — Medication 3 MG: at 21:57

## 2024-03-08 RX ADMIN — SODIUM CHLORIDE, SODIUM LACTATE, CALCIUM CHLORIDE, MAGNESIUM CHLORIDE AND DEXTROSE 6000 ML: 2.5; 538; 448; 18.3; 5.08 INJECTION, SOLUTION INTRAPERITONEAL at 16:41

## 2024-03-08 RX ADMIN — SODIUM BICARBONATE 650 MG: 650 TABLET ORAL at 21:56

## 2024-03-08 RX ADMIN — DULOXETINE HYDROCHLORIDE 20 MG: 20 CAPSULE, DELAYED RELEASE ORAL at 21:57

## 2024-03-08 RX ADMIN — ARFORMOTEROL TARTRATE: 15 SOLUTION RESPIRATORY (INHALATION) at 20:49

## 2024-03-08 RX ADMIN — MONTELUKAST 10 MG: 10 TABLET, FILM COATED ORAL at 21:57

## 2024-03-08 RX ADMIN — APIXABAN 2.5 MG: 2.5 TABLET, FILM COATED ORAL at 21:57

## 2024-03-08 RX ADMIN — ARFORMOTEROL TARTRATE: 15 SOLUTION RESPIRATORY (INHALATION) at 07:37

## 2024-03-08 RX ADMIN — ASPIRIN 81 MG CHEWABLE TABLET 81 MG: 81 TABLET CHEWABLE at 09:27

## 2024-03-08 RX ADMIN — TROLAMINE SALICYLATE: 100 CREAM TOPICAL at 22:20

## 2024-03-08 RX ADMIN — SODIUM BICARBONATE 650 MG: 650 TABLET ORAL at 09:27

## 2024-03-08 RX ADMIN — PHENOL 1 SPRAY: 1.4 SPRAY ORAL at 11:48

## 2024-03-08 RX ADMIN — ERYTHROPOIETIN 20000 UNITS: 20000 INJECTION, SOLUTION INTRAVENOUS; SUBCUTANEOUS at 17:19

## 2024-03-08 RX ADMIN — CARVEDILOL 6.25 MG: 6.25 TABLET, FILM COATED ORAL at 09:27

## 2024-03-08 RX ADMIN — INSULIN LISPRO 1 UNITS: 100 INJECTION, SOLUTION INTRAVENOUS; SUBCUTANEOUS at 09:23

## 2024-03-08 RX ADMIN — ROSUVASTATIN CALCIUM 10 MG: 10 TABLET, FILM COATED ORAL at 21:57

## 2024-03-08 ASSESSMENT — PAIN DESCRIPTION - ORIENTATION
ORIENTATION: MID;POSTERIOR;LEFT
ORIENTATION: LEFT
ORIENTATION: MID;POSTERIOR;LEFT
ORIENTATION: LEFT

## 2024-03-08 ASSESSMENT — PAIN DESCRIPTION - LOCATION
LOCATION: BACK;LEG;KNEE
LOCATION: BACK
LOCATION: BACK
LOCATION: BACK;KNEE;LEG

## 2024-03-08 ASSESSMENT — PAIN SCALES - GENERAL
PAINLEVEL_OUTOF10: 3
PAINLEVEL_OUTOF10: 4
PAINLEVEL_OUTOF10: 6
PAINLEVEL_OUTOF10: 8
PAINLEVEL_OUTOF10: 8
PAINLEVEL_OUTOF10: 9

## 2024-03-08 ASSESSMENT — PAIN DESCRIPTION - DESCRIPTORS
DESCRIPTORS: ACHING

## 2024-03-08 NOTE — PROGRESS NOTES
This  participated in the interdisciplinary rounds where patient care was discussed.     We will continue to follow and visit for spiritual care when appropriate.     Please contact  paging service for any immediate needs.     Rev. Unique Thompson MDiv, MSW  Resident

## 2024-03-08 NOTE — PROGRESS NOTES
Hospitalist Progress Note  Donell Lyon MD  Answering service: [phone] OR [phone] from in house phone        Date of Service:  3/8/2024  NAME:  Galilea Aguilar  :  1955  MRN:  [id number]      Admission Summary:     HPI: \"Galilea Aguilar is a 68 y.o. female with a history of CAD, COPD, CVA, diabetes, ESRD on peritoneal dialysis who presented to the ED with chief complaint of vomiting and left side/flank pain. In the ED, labs consistent with known renal dysfunction. QTc was found to be prolonged (554). CT demonstrated a large right retroperitoneal mass though has been present on several CT scans since . Patient treated with IV Valium for nausea and referred to hospitalists for further management.      Patient was seen and examined this morning in the ED, she was lying in bed in no acute distress -though did notice her having \"dry heaves\". Patient was recently admitted from  - 2024 for generalized abdominal pain, diagnosed with severe sepsis secondary to peritonitis and was discharged on vancomycin to be given through her peritoneal dialysis fluid and has also been prescribed Diflucan daily for the next month while on antibiotics.  She reports that since she has been discharged, she has had nausea and vomiting that has been persistent and has had difficulty keeping anything down.  Denies any headaches or dizziness, no chest pain or pressure, no shortness of breath or coughing.  Reports no diarrhea or constipation at home and has no abdominal pain.         Interval history / Subjective:     Follow up Nausea, vomiting  No nausea  Was able to feed her some food. 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

## 2024-03-08 NOTE — PROGRESS NOTES
Comprehensive Nutrition Assessment    Type and Reason for Visit: Reassess    Nutrition Recommendations/Plan:     Continue regular diet as tolerated.  Whole milk with all meals.  She will NOT drink supplements, so please do not order.    Adjust TF via DHT to cyclic - Vital 1.5 @ 60 mL/hr x 12 hours from 6 PM to 6 AM + 1 packet Prosource daily and 30 mL H2o flushes q 4 hours while TF running    Monitor PO intake/ strict PO documentation.    Consider d/c of TF above once pt able to consistently tolerate >60% of meals         Malnutrition Assessment:  Malnutrition Status:  Moderate malnutrition (02/23/24 1431)    Context:  Chronic Illness     Findings of the 6 clinical characteristics of malnutrition:  Energy Intake:  Unable to assess  Weight Loss:  Greater than 7.5% over 3 months     Body Fat Loss:  Mild body fat loss Triceps   Muscle Mass Loss:  Unable to assess    Fluid Accumulation:  No significant fluid accumulation     Strength:  Not Performed       Nutrition Assessment:    Past Medical History:   Diagnosis Date    Asthma     CAD (coronary artery disease)     COPD (chronic obstructive pulmonary disease) ([facility])     PCP manages    CVA (cerebral vascular accident) ([facility]) 11/01/2023    Patient presented with right-sided weakness.  Per MRI of the brain on 11/1/2023, acute versus subacute embolic infarctions involving the left MCA territory and right PICA territory    Diabetes mellitus, type 2 ([facility])     ESRD on peritoneal dialysis ([facility])     Eduardo (Tammie-PD nurse) [facility] [phone]- was on hemodialysis M-W-F Eduardo - now on PD    GERD (gastroesophageal reflux disease)     History of blood transfusion     Hyperlipidemia     Hypertension        Pt admitted with Hypomagnesemia [E83.42]  Vomiting [R11.10]  Prolonged Q-T interval on ECG [R94.31]  Intractable nausea and vomiting [R11.2]  GI following.    3/8: follow-up.  Pt tolerating TF @ goal of 50 mL/hr.  She is also eating more per report and tolerating without N/V.  I  LABA1C 5.9 12/14/2023 06:22 AM    LABA1C 6.6 11/02/2023 04:21 AM     03/01/2024 05:28 AM       Ernestina uL RD  Available via [employer]

## 2024-03-08 NOTE — CARE COORDINATION
Transition Of Care:     Nephrology, GI, ID, Pulm, PT/OT following. Therapy recommending SNF. The patient is a Peritoneal Dialysis patient from home and the only SNF's doing PD dialysis in the area are [facility] and [place]. CM called all district liaisons to inquire on SNF's accepting PD patients and these were the only two. CM called patient's daughter: Chaz and she declines the patient to go to these SNF's and will plan to take the patient home and [facility]. [facility] has accepted. Transport TBD     [place] [facility]   Address: [address] [place] [address], [address]  Phone: [phone], Fax: [phone]     RUR: 33% High  Prior Level of Functioning: Modified independent, uses a walker, cane and wheelchair, Family assists with bathing and dressing and [facility]  Disposition: Home with [facility] PT/OT  If SNF or IPR: Date FOC offered: 3/1/24  Date FOC received: Choice pending  Accepting facility: None. Patient's daughter declines SNF.   Date authorization started with reference number:   Date authorization received and expires:   Follow up appointments: NA  DME needed: The patient has dme: Walker, rollator, cane, wheelchair, shower chair.   Transportation at discharge: TBD  IM/IMM Medicare/ letter given: 2/23/24  Caregiver Contact: Son Mak Aguilar [phone]/Daughter Chaz Aguilar [phone]   Discharge Caregiver contacted prior to discharge? NA  Care Conference needed? No  Barriers to discharge: None     Gia Hardin RN/CRM  [phone]

## 2024-03-08 NOTE — FLOWSHEET NOTE
CCPD Connection: 03/08/24 1630   Vitals   Pulse 85   SpO2 100 %   Observations & Evaluations   Level of Consciousness 0   Oriented X 4   Heart Rhythm Regular   Respiratory Quality/Effort Unlabored   O2 Device None (Room air)   Bilateral Breath Sounds Diminished   Skin Condition/Temp Warm;Dry   Appetite Fair   Abdomen Inspection Soft;Rounded   Edema None   Peritoneal Dialysis Catheter Mid lower abdomen   No placement date or time found.   Catheter Location: Mid lower abdomen   Status Accessed   Site Condition Clean, dry, intact   Dressing Status New dressing applied   Dressing Gauze   Date of Last Dressing Change 03/08/24   Dialysis Type Continuous cycling   Exit Site Condition excellent   Catheter Care Given Yes  (one minute alcavis scrub followed by one minute alcavis soak)   Cycler   Verification of Prescription CCPD   Informed Consent  Yes  (chronic consent applies)   Total Volume Programmed 83660 mL   Therapy Time (Hours:Minutes) 9:30   Cycler Type Jung HomeChoice   Fill Volume 2500 mL   Last Fill Volume 0 mL   Dextrose Setting Same (Nonextraneal)   I Drain Alarm 0 mL   Number of Cycles 5   Bag Volume 6000 mL   Number of Bags Used 3   Dianeal Solution   (Dextrose 2.5% in 6000 mL)     Hepatitis B Surface Ag   Date/Time Value Ref Range Status   02/26/2024 04:19 AM <0.10 Index Final            Hep B S Ab   Date/Time Value Ref Range Status   02/29/2024 07:51 AM <3.10 mIU/mL Final      Time Out (time): 1630  Primary RN SBAR: RUBENS Castro, RN  Patient Education: Infection prevention  Comments: Remained present for completion of initial drain and initiation of first fill. Last fill added for specimen collection in AM.     Start time: 1630 (3/8/24)  Est end time: 0200 (3/9/24)

## 2024-03-08 NOTE — DIABETES MGMT
[facility]  DIABETES MANAGEMENT CONSULT    Re-consulted by Provider for advanced nursing evaluation and care for inpatient blood glucose management.    Evaluation and Action Plan   This 68 year old AA female was admitted 2/21/24 with nausea & vomiting. Patient has CKD and is on PD at home; this has continued in-house. Usual home insulin dosing consists of 10 units of total insulin per day. Patient was receiving TF for a few days, along with corrective insulin, with Bgs rising into the 200-300s. TF was stopped 3/4/24 in order to address pulmonary edema. PD useful in pulling extra fluid, but Bps were lowish. Given one-time dose of steroid & a pint of PRBCs. TF was not restarted until yesterday to encourage oral intake. Bgs have risen into the 200s. Dietary plan is to switch to nocturnal tube feedings beginning 6pm through 6am; hence, she will need an additional 10 units to override the expected BG rise from 135 grams of dextrose during that time frame.     Management Rationale Action Plan   Medication   Basal & nutritional needs Based on  []        Blood glucose pattern  []        Weight & BMI  []        Kidney dysfunction  [x]        Home diabetes regimen     Continue NPH insulin 9 units in AM    Increase NPH insulin to 18 units at 6pm (to override TF)   Corrective insulin Based on sensitivity  [x]        Low   []        Medium  []        High      Additional orders        Diabetes Discharge Plan   Medication  TBD     Referral  []        Outpatient diabetes education   Additional orders            Initial Presentation   Galilea Aguilar is a 68 y.o. female admitted 2/21/24 from ED after experiencing vomiting and left side/flank pain - ESRD-PD dependant. Recent admission 1/25-2/5 for abd pain/ sepsis secondary to peritonitis and was d/c'd with abx. Since discharged persistent N/V.   CT: Large right retroperitoneal mass though has been present on several CT scans since 2021      HX:  Past  dialysis  Normal liver enzymes      Blood glucose pattern      Significant diabetes-related events  2/21/24 Admission . CKD  2/22-27/24 No insulin given. Bgs <180. Prednisone doses X2  2/28/24 Prednsone dose X1 => BG to >200. Tfs+  3/2-4/24 Bgs into the 200s requiring corrective insulin. Began scheduled NPH on 3/4  3/5/24 One-time steroid dose => Bgs into 200-300s  3/6/24 Bgs 170-190s  3/7/24 Bgs rising into 200s with TF  3/8/24 TF will switch to nocturnal feeds     Assessment and Nursing Intervention   Nursing Diagnosis Risk for unstable blood glucose pattern   Nursing Intervention Domain 5250 Decision-making Support   Nursing Interventions Examined current inpatient diabetes/blood glucose control   Explored factors facilitating and impeding inpatient management  Explored corrective strategies with patient and responsible inpatient provider   Informed patient of rational for insulin strategy while hospitalized     Billing Code(s)   [] 47930 IP subsequent hospital care - 50 minutes   [x] 57181 IP subsequent hospital care - 35 minutes   [] 61914 IP subsequent hospital care - 25 minutes   [] 46411 IP initial hospital care - 40 minutes     Before making these care recommendations, I personally reviewed the hospitalization record, including notes, laboratory & diagnostic data and current medications, and examined the patient at the bedside (circumstances permitting) before determining care. More than fifty (50) percent of the time was spent in patient counseling and/or care coordination.  Total minutes: 35    DAYA Garcia - CNS  Clinical Nurse Specialist - Diabetes & endocrine disorders  [facility]  Access via [employer]

## 2024-03-08 NOTE — PROGRESS NOTES
[facility]      Nephrology  Note  Galilea Aguilar  Date of Admission : 2/21/2024    CC:  Follow up for ESRD       Assessment and Plan     ESRD on PD   - on CCPD 2.5% x 3 bags  - continue with current prescription  good PD   - No chemistries obtained this AM  - Daily labs    Hypo-K, mag, Phos,ca  - 2/2 poor nutrition  - Last K, phos, cor Ca all stable  - replete PRN    Recent PD peritonitis  - cx positive for staph epi  -Completed IV antibiotics  -No further IP antibiotics planned    Hypoxia   - pulmonary following  - CT: Bilateral lower lobe atelectasis. Minimal pulmonary edema.   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 Renal cysts  -S/p post pylori feeding tube     Anemia of CKD  -Continue LIS 20K  - recommend transfusion for Hgb <7  - repeat iron stores stable with elevated Ferritin- no iron    Encephalopathy: resolved  Prolonged QT interval   HTN   DM  COPD  CAD       Interval History:  Patient seen and examined this AM. She is resting quietly in NAD. TF at 40 cc/hr. Tolerating new prescription 2.5% with good UF. She is alert and answering questions appropriately this AM.     Current Medications: all current  Medications have been eviewed in EPIC  Review of Systems: A comprehensive review of systems was negative.    Objective:  Vitals:    Vitals:    03/08/24 0553 03/08/24 0600 03/08/24 0700 03/08/24 0739   BP:  (!) 135/59 137/65    Pulse: 81 82 82 81   Resp:  18 (!) 34 19   Temp:       TempSrc:       SpO2:  98% 100% 100%   Weight:       Height:         Intake and Output:  03/08 0701 - 03/08 1900  In: 10 [I.V.:10]  Out: -   03/06 1901 - 03/08 0700  In: 240   Out: 2682     Physical Examination:  General: Alert and conversant this AM  HEENT Dobhoff in place-

## 2024-03-08 NOTE — FLOWSHEET NOTE
Peritoneal Dialysis Disconnection / [phone]    Primary RN SBAR: JANES Anglin  Patient Education: access/site care    Hepatitis B Surface Ag   Date/Time Value Ref Range Status   02/26/2024 04:19 AM <0.10 Index Final     Hep B S Ab   Date/Time Value Ref Range Status   02/29/2024 07:51 AM <3.10 mIU/mL Final          03/08/24 0353   Peritoneal Dialysis Catheter Mid lower abdomen   No placement date or time found.   Catheter Location: Mid lower abdomen   Status Deaccessed   Site Condition Clean, dry, intact   Dressing Status Clean, dry & intact   Dressing Gauze   Date of Last Dressing Change 03/07/24   Dialysis Type Continuous cycling   Exit Site Condition excellent   Catheter Care Given Yes   Post-Treatment (Cycler)   Average Dwell Time (Hours:Minutes) 1:21   Lost Dwell Time (Hours:Minutes) 0:06   Effluent Appearance Clear;Yellow   PD Output (mL) 832 mL  ( ml +  ml =832)     Miguelangel RN at bedside to disconnect pt from CCPD tx. Orders, consent, pt, and code status confirmed. Tx completed. Used cassette and bags discarded. Education and pre/post report given to primary RN.    Net fluid loss: 832 ml

## 2024-03-08 NOTE — PROGRESS NOTES
Outcome: Patient expressed gratitude for the spiritual care received during visit.     Plan of Care: Patient is aware of 's availability and was encouraged to request support as needed.     For additional spiritual care, please contact the  on-call at [phone] ([phone]).     Rev. Unique Thompson MDiv, MSW  Resident

## 2024-03-08 NOTE — PROGRESS NOTES
[facility]      Nephrology  Note  Galilea Aguilar  Date of Admission : 2/21/2024    CC:  Follow up for ESRD       Assessment and Plan     ESRD on PD   - on CCPD 2.5% x 3 bags  - cont w/ current PD Rx  - daily labs over the weekend    Hypo-K, mag, Phos,ca  - 2/2 poor nutrition  - resume K-phos     Recent PD peritonitis  - cx positive for staph epi on 1/26  -Completed abx course  - PD fluid neg here this admission    Hypoxia   - pulmonary following  - CT: Bilateral lower lobe atelectasis. Minimal pulmonary edema.   - good UF with current PD prescription    Malnutrition:  - on TF now  - starting regular diet    Abdominal pain with vomiting  - CT showing gall stones with sludge, right retroperitoneal mass consistent with Lipoma that is unchanged from prior study. Renal cysts    Anemia of CKD  -Continue LIS 20K  - recommend transfusion for Hgb <7  - repeat iron stores stable with elevated Ferritin- no iron    Encephalopathy: resolved  Prolonged QT interval   HTN   DM  COPD  CAD       Interval History:  Patient seen and examined.  Eating breakfast.  No vomiting this AM.  No cp, sob,n/v/d reported at this time.    Current Medications: all current  Medications have been eviewed in EPIC  Review of Systems: A comprehensive review of systems was negative.    Objective:  Vitals:    Vitals:    03/08/24 0553 03/08/24 0600 03/08/24 0700 03/08/24 0739   BP:  (!) 135/59 137/65    Pulse: 81 82 82 81   Resp:  18 (!) 34 19   Temp:       TempSrc:       SpO2:  98% 100% 100%   Weight:       Height:         Intake and Output:  No intake/output data recorded.  03/06 1901 - 03/08 0700  In: 240   Out: 2682     Physical Examination:  General: Alert and conversant this AM  HEENT Dobhoff in place- TF infusing  Resp:  Diminished posteriorly, no wheezing, on RA  CV:  Regular Rate/ Rhythm,  no  LE edema  GI:  Soft, NT, PD exit site dressing D/I.  Neurologic:  Oriented person/ place      LABS:  Recent Labs     03/08/24  0320  03/06/24  0039 03/05/24  1422 03/05/24  0050    136  137 138 136   K 3.3* 4.0  4.0 4.2 4.6    101  101 102 101   CO2 29 25  28 28 28   BUN 29* 26*  25* 26* 25*   CREATININE 4.72* 4.72*  4.68* 4.88* 4.70*   CALCIUM 7.8* 7.7*  7.7* 7.4* 7.1*   LABALBU 1.4* 1.5*  1.5* 1.5*  --    PHOS 2.3* 4.2  4.0 4.0 4.5   MG 1.8 1.8  --  1.8       Recent Labs     03/08/24  0324 03/07/24  0313 03/06/24  0039   WBC 10.9 9.7 12.9*   HGB 8.4* 8.0* 8.2*   HCT 29.4* 25.4* 25.6*    147* 168       No results for input(s): \"WINSTON\", \"KU\", \"CLU\", \"CREAU\" in the last 720 hours.    Invalid input(s): \"PROU\"  No results found for: \"SDES\"  No components found for: \"CULT\"      Review of Medical Records: I have reviewed all pertinent labs, chart notes, microbiology data, radiology imaging this patient.  Medications list personally reviewed.  No change in PMH ,family and social history from Consult note.      Loc Queen MD  [facility] [address], [facility] [place] [address]  Phone: [phone]   [phone]

## 2024-03-08 NOTE — PLAN OF CARE
Problem: Discharge Planning  Goal: Discharge to home or other facility with appropriate resources  Outcome: Progressing  Flowsheets (Taken 3/7/2024 1945)  Discharge to home or other facility with appropriate resources:   Identify barriers to discharge with patient and caregiver   Identify discharge learning needs (meds, wound care, etc)     Problem: Pain  Goal: Verbalizes/displays adequate comfort level or baseline comfort level  Outcome: Progressing     Problem: Safety - Adult  Goal: Free from fall injury  Outcome: Progressing     Problem: Chronic Conditions and Co-morbidities  Goal: Patient's chronic conditions and co-morbidity symptoms are monitored and maintained or improved  Outcome: Progressing  Flowsheets (Taken 3/7/2024 1945)  Care Plan - Patient's Chronic Conditions and Co-Morbidity Symptoms are Monitored and Maintained or Improved: Monitor and assess patient's chronic conditions and comorbid symptoms for stability, deterioration, or improvement     Problem: Nutrition Deficit:  Goal: Optimize nutritional status  Outcome: Progressing     Problem: Physical Therapy - Adult  Goal: By Discharge: Performs mobility at highest level of function for planned discharge setting.  See evaluation for individualized goals.  Description: FUNCTIONAL STATUS PRIOR TO ADMISSION: Patient was modified independent using a rolling walker for functional mobility, wheelchair for longer distances. Pt reported a fall a few days prior to this admission    HOME SUPPORT PRIOR TO ADMISSION: The patient lived with family and required assist for some ADLs.    Physical Therapy Goals  Initiated 2/23/2024, reassessed and remain appropriate 03/04/24   1.  Patient will move from supine to sit and sit to supine and roll side to side in bed with modified independence within 7 day(s).    2.  Patient will perform sit to stand with modified independence within 7 day(s).  3.  Patient will transfer from bed to chair and chair to bed with modified

## 2024-03-09 LAB
ERYTHROCYTE [DISTWIDTH] IN BLOOD BY AUTOMATED COUNT: 19.7 % (ref 11.5–14.5)
GLUCOSE BLD STRIP.AUTO-MCNC: 115 MG/DL (ref 65–117)
GLUCOSE BLD STRIP.AUTO-MCNC: 158 MG/DL (ref 65–117)
GLUCOSE BLD STRIP.AUTO-MCNC: 163 MG/DL (ref 65–117)
GLUCOSE BLD STRIP.AUTO-MCNC: 182 MG/DL (ref 65–117)
GLUCOSE BLD STRIP.AUTO-MCNC: 71 MG/DL (ref 65–117)
HCT VFR BLD AUTO: 28 % (ref 35–47)
HGB BLD-MCNC: 8.6 G/DL (ref 11.5–16)
MCH RBC QN AUTO: 29 PG (ref 26–34)
MCHC RBC AUTO-ENTMCNC: 30.7 G/DL (ref 30–36.5)
MCV RBC AUTO: 94.3 FL (ref 80–99)
NRBC # BLD: 0 K/UL (ref 0–0.01)
NRBC BLD-RTO: 0 PER 100 WBC
PLATELET # BLD AUTO: 186 K/UL (ref 150–400)
PMV BLD AUTO: 10.3 FL (ref 8.9–12.9)
RBC # BLD AUTO: 2.97 M/UL (ref 3.8–5.2)
SERVICE CMNT-IMP: ABNORMAL
SERVICE CMNT-IMP: NORMAL
SERVICE CMNT-IMP: NORMAL
WBC # BLD AUTO: 12.6 K/UL (ref 3.6–11)

## 2024-03-09 PROCEDURE — 82962 GLUCOSE BLOOD TEST: CPT

## 2024-03-09 PROCEDURE — 85027 COMPLETE CBC AUTOMATED: CPT

## 2024-03-09 PROCEDURE — 6370000000 HC RX 637 (ALT 250 FOR IP): Performed by: NURSE PRACTITIONER

## 2024-03-09 PROCEDURE — 6360000002 HC RX W HCPCS: Performed by: NURSE PRACTITIONER

## 2024-03-09 PROCEDURE — 6370000000 HC RX 637 (ALT 250 FOR IP): Performed by: CLINICAL NURSE SPECIALIST

## 2024-03-09 PROCEDURE — 2580000003 HC RX 258: Performed by: NURSE PRACTITIONER

## 2024-03-09 PROCEDURE — 6360000002 HC RX W HCPCS: Performed by: INTERNAL MEDICINE

## 2024-03-09 PROCEDURE — 6370000000 HC RX 637 (ALT 250 FOR IP): Performed by: STUDENT IN AN ORGANIZED HEALTH CARE EDUCATION/TRAINING PROGRAM

## 2024-03-09 PROCEDURE — 90945 DIALYSIS ONE EVALUATION: CPT

## 2024-03-09 PROCEDURE — 2060000000 HC ICU INTERMEDIATE R&B

## 2024-03-09 PROCEDURE — 6370000000 HC RX 637 (ALT 250 FOR IP): Performed by: HOSPITALIST

## 2024-03-09 PROCEDURE — 94640 AIRWAY INHALATION TREATMENT: CPT

## 2024-03-09 PROCEDURE — 36415 COLL VENOUS BLD VENIPUNCTURE: CPT

## 2024-03-09 PROCEDURE — 6370000000 HC RX 637 (ALT 250 FOR IP): Performed by: INTERNAL MEDICINE

## 2024-03-09 RX ORDER — POTASSIUM CHLORIDE 7.45 MG/ML
10 INJECTION INTRAVENOUS
Status: COMPLETED | OUTPATIENT
Start: 2024-03-09 | End: 2024-03-09

## 2024-03-09 RX ADMIN — DULOXETINE HYDROCHLORIDE 20 MG: 20 CAPSULE, DELAYED RELEASE ORAL at 21:16

## 2024-03-09 RX ADMIN — Medication 100 MG: at 09:04

## 2024-03-09 RX ADMIN — Medication 5000 UNITS: at 09:17

## 2024-03-09 RX ADMIN — APIXABAN 2.5 MG: 2.5 TABLET, FILM COATED ORAL at 21:08

## 2024-03-09 RX ADMIN — POTASSIUM CHLORIDE 10 MEQ: 10 INJECTION, SOLUTION INTRAVENOUS at 17:40

## 2024-03-09 RX ADMIN — DIBASIC SODIUM PHOSPHATE, MONOBASIC POTASSIUM PHOSPHATE AND MONOBASIC SODIUM PHOSPHATE 2 TABLET: 852; 155; 130 TABLET ORAL at 09:04

## 2024-03-09 RX ADMIN — Medication 10 ML: at 21:09

## 2024-03-09 RX ADMIN — POTASSIUM CHLORIDE 10 MEQ: 10 INJECTION, SOLUTION INTRAVENOUS at 16:29

## 2024-03-09 RX ADMIN — DIBASIC SODIUM PHOSPHATE, MONOBASIC POTASSIUM PHOSPHATE AND MONOBASIC SODIUM PHOSPHATE 2 TABLET: 852; 155; 130 TABLET ORAL at 21:08

## 2024-03-09 RX ADMIN — MONTELUKAST 10 MG: 10 TABLET, FILM COATED ORAL at 21:08

## 2024-03-09 RX ADMIN — DULOXETINE HYDROCHLORIDE 20 MG: 20 CAPSULE, DELAYED RELEASE ORAL at 09:04

## 2024-03-09 RX ADMIN — ARFORMOTEROL TARTRATE: 15 SOLUTION RESPIRATORY (INHALATION) at 08:11

## 2024-03-09 RX ADMIN — LANSOPRAZOLE 15 MG: 30 TABLET, ORALLY DISINTEGRATING, DELAYED RELEASE ORAL at 07:39

## 2024-03-09 RX ADMIN — SODIUM BICARBONATE 650 MG: 650 TABLET ORAL at 21:08

## 2024-03-09 RX ADMIN — CARVEDILOL 6.25 MG: 6.25 TABLET, FILM COATED ORAL at 16:34

## 2024-03-09 RX ADMIN — INSULIN HUMAN 9 UNITS: 100 INJECTION, SUSPENSION SUBCUTANEOUS at 09:16

## 2024-03-09 RX ADMIN — INSULIN HUMAN 18 UNITS: 100 INJECTION, SUSPENSION SUBCUTANEOUS at 19:39

## 2024-03-09 RX ADMIN — SODIUM CHLORIDE, SODIUM LACTATE, CALCIUM CHLORIDE, MAGNESIUM CHLORIDE AND DEXTROSE 6000 ML: 2.5; 538; 448; 18.3; 5.08 INJECTION, SOLUTION INTRAPERITONEAL at 17:21

## 2024-03-09 RX ADMIN — GENTAMICIN SULFATE: 1 CREAM TOPICAL at 17:21

## 2024-03-09 RX ADMIN — APIXABAN 2.5 MG: 2.5 TABLET, FILM COATED ORAL at 09:03

## 2024-03-09 RX ADMIN — Medication 3 MG: at 21:08

## 2024-03-09 RX ADMIN — ROSUVASTATIN CALCIUM 10 MG: 10 TABLET, FILM COATED ORAL at 21:08

## 2024-03-09 RX ADMIN — SODIUM BICARBONATE 650 MG: 650 TABLET ORAL at 09:04

## 2024-03-09 RX ADMIN — Medication 10 ML: at 09:04

## 2024-03-09 RX ADMIN — POTASSIUM CHLORIDE 10 MEQ: 10 INJECTION, SOLUTION INTRAVENOUS at 15:06

## 2024-03-09 RX ADMIN — CARVEDILOL 6.25 MG: 6.25 TABLET, FILM COATED ORAL at 09:04

## 2024-03-09 RX ADMIN — ARFORMOTEROL TARTRATE: 15 SOLUTION RESPIRATORY (INHALATION) at 21:17

## 2024-03-09 RX ADMIN — ASPIRIN 81 MG CHEWABLE TABLET 81 MG: 81 TABLET CHEWABLE at 09:04

## 2024-03-09 NOTE — FLOWSHEET NOTE
03/09/24 0600   Vitals   Pulse 85   Observations & Evaluations   Level of Consciousness 1   Heart Rhythm Regular   O2 Device None (Room air)   Skin Color Pink   Skin Condition/Temp Warm;Dry   Abdomen Inspection Soft;Obese   Edema None   Peritoneal Dialysis Catheter Mid lower abdomen   No placement date or time found.   Catheter Location: Mid lower abdomen   Status Deaccessed   Site Condition Clean, dry, intact   Dressing Status Clean, dry & intact   Dressing Gauze   Date of Last Dressing Change 03/08/24   Dialysis Type Continuous cycling   Catheter Care Given Yes  (Two minute Alcavis scrub/soak performed prior to connection.)   Post-Treatment (Cycler)   Average Dwell Time (Hours:Minutes) 1:19   Lost Dwell Time (Hours:Minutes) :14   Effluent Appearance Clear;Yellow   PD Output (mL) 1022 mL  (Initial drain 42 ml + total  ml = net UF 1022 ml)     Primary RN SBAR: Shawn Smith RN  Comments:  After catheter disinfection, sterile mini cap placed and PD catheter taped securely to the patient's abdomen. Used cassette, lines and bags discarded in red bin.

## 2024-03-09 NOTE — FLOWSHEET NOTE
CCPD connection      03/09/24 1717   Vitals   BP (!) 140/72   Temp 98.2 °F (36.8 °C)   Temp Source Oral   Pulse 88   Respirations 16   SpO2 99 %   Observations & Evaluations   Level of Consciousness 0   Oriented X 3   Heart Rhythm Regular   Respiratory Quality/Effort Unlabored   O2 Device None (Room air)   Bilateral Breath Sounds Clear   Skin Color Other (comment)  (Appropriate for race)   Skin Condition/Temp Dry;Warm   Appetite Poor   Abdomen Inspection Soft;Obese   Bowel Sounds (All Quadrants) Active   Edema None   Peritoneal Dialysis Catheter Mid lower abdomen   No placement date or time found.   Catheter Location: Mid lower abdomen   Status Accessed   Site Condition Clean, dry, intact   Dressing Status New dressing applied   Dressing Gauze   Date of Last Dressing Change 03/09/24   Dialysis Type Continuous cycling   Exit Site Condition good   Catheter Care Given Yes  (two minute alcavis scrub and soak prior to connection.)   Cycler   Verification of Prescription CCPD   Informed Consent  Yes  (Chronic consent applies)   Total Volume Programmed 18185 mL   Therapy Time (Hours:Minutes) 9.30   Cycler Type Jung HomeChoice   Fill Volume 2000 mL   Last Fill Volume 0 mL   Dextrose Setting Same (Nonextraneal)   Number of Cycles 5   Bag Volume 6000 mL   Number of Bags Used 3   Dianeal Solution Other (Comment)  (2.5% in 6000 ml)       Primary RN SBAR: GIL Arellano, JANES     Patient Education: Pt educated about access care and treatment orders reviewed.     Hospital associated wait time; reason: 30 minutes due to pharmacy.     Timeout: 1715  Start time: 1730  End time: 0300    Hepatitis B Surface Ag   Date/Time Value Ref Range Status   02/26/2024 04:19 AM <0.10 Index Final     Hep B S Ab   Date/Time Value Ref Range Status   02/29/2024 07:51 AM <3.10 mIU/mL Final

## 2024-03-09 NOTE — PROGRESS NOTES
Hospitalist Progress Note  Jak Aponte MD  Answering service: [phone] OR [phone] from in house phone        Date of Service:  3/9/2024  NAME:  Galilea Aguilar  :  1955  MRN:  [id number]      Admission Summary:     HPI: \"Galilea Aguilar is a 68 y.o. female with a history of CAD, COPD, CVA, diabetes, ESRD on peritoneal dialysis who presented to the ED with chief complaint of vomiting and left side/flank pain. In the ED, labs consistent with known renal dysfunction. QTc was found to be prolonged (554). CT demonstrated a large right retroperitoneal mass though has been present on several CT scans since . Patient treated with IV Valium for nausea and referred to hospitalists for further management.      Patient was seen and examined this morning in the ED, she was lying in bed in no acute distress -though did notice her having \"dry heaves\". Patient was recently admitted from  - 2024 for generalized abdominal pain, diagnosed with severe sepsis secondary to peritonitis and was discharged on vancomycin to be given through her peritoneal dialysis fluid and has also been prescribed Diflucan daily for the next month while on antibiotics.  She reports that since she has been discharged, she has had nausea and vomiting that has been persistent and has had difficulty keeping anything down.  Denies any headaches or dizziness, no chest pain or pressure, no shortness of breath or coughing.  Reports no diarrhea or constipation at home and has no abdominal pain.         Interval history / Subjective:       Patient seen & examined  Denies any nausea/ vomiting   She is comfortably lying in bed   On PD for ESRD     Assessment & Plan:     Intractable nausea and vomiting possible due to gastroparesis   Recent Peritonitis, completed abx treatment  Retroperitoneal Mass-possible lipoma  -CT abdomen no acute pathology has had  retrieve estimated LOS  ACTUAL LENGTH OF STAY:          16                 Jak Aponte MD

## 2024-03-10 ENCOUNTER — APPOINTMENT (OUTPATIENT)
Facility: HOSPITAL | Age: 69
End: 2024-03-10
Payer: MEDICARE

## 2024-03-10 LAB
BASOPHILS # BLD: 0.1 K/UL (ref 0–0.1)
BASOPHILS NFR BLD: 1 % (ref 0–1)
COMMENT:: NORMAL
DIFFERENTIAL METHOD BLD: ABNORMAL
EOSINOPHIL # BLD: 0.3 K/UL (ref 0–0.4)
EOSINOPHIL NFR BLD: 2 % (ref 0–7)
ERYTHROCYTE [DISTWIDTH] IN BLOOD BY AUTOMATED COUNT: 19.9 % (ref 11.5–14.5)
ERYTHROCYTE [DISTWIDTH] IN BLOOD BY AUTOMATED COUNT: 20 % (ref 11.5–14.5)
GLUCOSE BLD STRIP.AUTO-MCNC: 101 MG/DL (ref 65–117)
GLUCOSE BLD STRIP.AUTO-MCNC: 111 MG/DL (ref 65–117)
GLUCOSE BLD STRIP.AUTO-MCNC: 144 MG/DL (ref 65–117)
GLUCOSE BLD STRIP.AUTO-MCNC: 201 MG/DL (ref 65–117)
GLUCOSE BLD STRIP.AUTO-MCNC: 212 MG/DL (ref 65–117)
GLUCOSE BLD STRIP.AUTO-MCNC: 234 MG/DL (ref 65–117)
GLUCOSE BLD STRIP.AUTO-MCNC: 57 MG/DL (ref 65–117)
HCT VFR BLD AUTO: 22.1 % (ref 35–47)
HCT VFR BLD AUTO: 25.3 % (ref 35–47)
HGB BLD-MCNC: 6.7 G/DL (ref 11.5–16)
HGB BLD-MCNC: 7.7 G/DL (ref 11.5–16)
HISTORY CHECK: NORMAL
IMM GRANULOCYTES # BLD AUTO: 0.1 K/UL (ref 0–0.04)
IMM GRANULOCYTES NFR BLD AUTO: 1 % (ref 0–0.5)
LYMPHOCYTES # BLD: 1.6 K/UL (ref 0.8–3.5)
LYMPHOCYTES NFR BLD: 13 % (ref 12–49)
MCH RBC QN AUTO: 29.1 PG (ref 26–34)
MCH RBC QN AUTO: 29.3 PG (ref 26–34)
MCHC RBC AUTO-ENTMCNC: 30.3 G/DL (ref 30–36.5)
MCHC RBC AUTO-ENTMCNC: 30.4 G/DL (ref 30–36.5)
MCV RBC AUTO: 95.5 FL (ref 80–99)
MCV RBC AUTO: 96.5 FL (ref 80–99)
MONOCYTES # BLD: 0.6 K/UL (ref 0–1)
MONOCYTES NFR BLD: 5 % (ref 5–13)
NEUTS SEG # BLD: 9.7 K/UL (ref 1.8–8)
NEUTS SEG NFR BLD: 78 % (ref 32–75)
NRBC # BLD: 0 K/UL (ref 0–0.01)
NRBC # BLD: 0 K/UL (ref 0–0.01)
NRBC BLD-RTO: 0 PER 100 WBC
NRBC BLD-RTO: 0 PER 100 WBC
PLATELET # BLD AUTO: 217 K/UL (ref 150–400)
PLATELET # BLD AUTO: 247 K/UL (ref 150–400)
PMV BLD AUTO: 10.3 FL (ref 8.9–12.9)
PMV BLD AUTO: 10.4 FL (ref 8.9–12.9)
RBC # BLD AUTO: 2.29 M/UL (ref 3.8–5.2)
RBC # BLD AUTO: 2.65 M/UL (ref 3.8–5.2)
SERVICE CMNT-IMP: ABNORMAL
SERVICE CMNT-IMP: NORMAL
SERVICE CMNT-IMP: NORMAL
SPECIMEN HOLD: NORMAL
WBC # BLD AUTO: 12.4 K/UL (ref 3.6–11)
WBC # BLD AUTO: 14.3 K/UL (ref 3.6–11)

## 2024-03-10 PROCEDURE — 86850 RBC ANTIBODY SCREEN: CPT

## 2024-03-10 PROCEDURE — 86923 COMPATIBILITY TEST ELECTRIC: CPT

## 2024-03-10 PROCEDURE — 86900 BLOOD TYPING SEROLOGIC ABO: CPT

## 2024-03-10 PROCEDURE — 2700000000 HC OXYGEN THERAPY PER DAY

## 2024-03-10 PROCEDURE — 6370000000 HC RX 637 (ALT 250 FOR IP): Performed by: STUDENT IN AN ORGANIZED HEALTH CARE EDUCATION/TRAINING PROGRAM

## 2024-03-10 PROCEDURE — 85025 COMPLETE CBC W/AUTO DIFF WBC: CPT

## 2024-03-10 PROCEDURE — 6370000000 HC RX 637 (ALT 250 FOR IP): Performed by: NURSE PRACTITIONER

## 2024-03-10 PROCEDURE — 90945 DIALYSIS ONE EVALUATION: CPT

## 2024-03-10 PROCEDURE — 85027 COMPLETE CBC AUTOMATED: CPT

## 2024-03-10 PROCEDURE — 2580000003 HC RX 258: Performed by: NURSE PRACTITIONER

## 2024-03-10 PROCEDURE — 6370000000 HC RX 637 (ALT 250 FOR IP): Performed by: HOSPITALIST

## 2024-03-10 PROCEDURE — 94640 AIRWAY INHALATION TREATMENT: CPT

## 2024-03-10 PROCEDURE — 2060000000 HC ICU INTERMEDIATE R&B

## 2024-03-10 PROCEDURE — 71045 X-RAY EXAM CHEST 1 VIEW: CPT

## 2024-03-10 PROCEDURE — 6370000000 HC RX 637 (ALT 250 FOR IP): Performed by: CLINICAL NURSE SPECIALIST

## 2024-03-10 PROCEDURE — 2500000003 HC RX 250 WO HCPCS: Performed by: HOSPITALIST

## 2024-03-10 PROCEDURE — 6360000002 HC RX W HCPCS: Performed by: NURSE PRACTITIONER

## 2024-03-10 PROCEDURE — 86901 BLOOD TYPING SEROLOGIC RH(D): CPT

## 2024-03-10 PROCEDURE — 82962 GLUCOSE BLOOD TEST: CPT

## 2024-03-10 PROCEDURE — 6370000000 HC RX 637 (ALT 250 FOR IP): Performed by: INTERNAL MEDICINE

## 2024-03-10 PROCEDURE — 36415 COLL VENOUS BLD VENIPUNCTURE: CPT

## 2024-03-10 RX ORDER — SODIUM CHLORIDE 9 MG/ML
INJECTION, SOLUTION INTRAVENOUS PRN
Status: DISCONTINUED | OUTPATIENT
Start: 2024-03-10 | End: 2024-03-16 | Stop reason: HOSPADM

## 2024-03-10 RX ADMIN — DULOXETINE HYDROCHLORIDE 20 MG: 20 CAPSULE, DELAYED RELEASE ORAL at 09:50

## 2024-03-10 RX ADMIN — ARFORMOTEROL TARTRATE: 15 SOLUTION RESPIRATORY (INHALATION) at 21:35

## 2024-03-10 RX ADMIN — ARFORMOTEROL TARTRATE: 15 SOLUTION RESPIRATORY (INHALATION) at 07:28

## 2024-03-10 RX ADMIN — INSULIN HUMAN 9 UNITS: 100 INJECTION, SUSPENSION SUBCUTANEOUS at 09:52

## 2024-03-10 RX ADMIN — SODIUM BICARBONATE 650 MG: 650 TABLET ORAL at 21:21

## 2024-03-10 RX ADMIN — ASPIRIN 81 MG CHEWABLE TABLET 81 MG: 81 TABLET CHEWABLE at 09:51

## 2024-03-10 RX ADMIN — CARVEDILOL 6.25 MG: 6.25 TABLET, FILM COATED ORAL at 17:34

## 2024-03-10 RX ADMIN — INSULIN HUMAN 18 UNITS: 100 INJECTION, SUSPENSION SUBCUTANEOUS at 19:29

## 2024-03-10 RX ADMIN — GUAIFENESIN 400 MG: 200 SOLUTION ORAL at 10:13

## 2024-03-10 RX ADMIN — DULOXETINE HYDROCHLORIDE 20 MG: 20 CAPSULE, DELAYED RELEASE ORAL at 21:42

## 2024-03-10 RX ADMIN — DIBASIC SODIUM PHOSPHATE, MONOBASIC POTASSIUM PHOSPHATE AND MONOBASIC SODIUM PHOSPHATE 2 TABLET: 852; 155; 130 TABLET ORAL at 21:21

## 2024-03-10 RX ADMIN — SODIUM CHLORIDE, SODIUM LACTATE, CALCIUM CHLORIDE, MAGNESIUM CHLORIDE AND DEXTROSE 6000 ML: 2.5; 538; 448; 18.3; 5.08 INJECTION, SOLUTION INTRAPERITONEAL at 18:27

## 2024-03-10 RX ADMIN — ROSUVASTATIN CALCIUM 10 MG: 10 TABLET, FILM COATED ORAL at 21:21

## 2024-03-10 RX ADMIN — SODIUM CHLORIDE, SODIUM LACTATE, CALCIUM CHLORIDE, MAGNESIUM CHLORIDE AND DEXTROSE 6000 ML: 2.5; 538; 448; 18.3; 5.08 INJECTION, SOLUTION INTRAPERITONEAL at 18:28

## 2024-03-10 RX ADMIN — GENTAMICIN SULFATE: 1 CREAM TOPICAL at 18:27

## 2024-03-10 RX ADMIN — MONTELUKAST 10 MG: 10 TABLET, FILM COATED ORAL at 21:21

## 2024-03-10 RX ADMIN — APIXABAN 2.5 MG: 2.5 TABLET, FILM COATED ORAL at 21:21

## 2024-03-10 RX ADMIN — APIXABAN 2.5 MG: 2.5 TABLET, FILM COATED ORAL at 09:51

## 2024-03-10 RX ADMIN — Medication 3 MG: at 21:21

## 2024-03-10 RX ADMIN — Medication 100 MG: at 09:51

## 2024-03-10 RX ADMIN — DIBASIC SODIUM PHOSPHATE, MONOBASIC POTASSIUM PHOSPHATE AND MONOBASIC SODIUM PHOSPHATE 2 TABLET: 852; 155; 130 TABLET ORAL at 09:51

## 2024-03-10 RX ADMIN — Medication 10 ML: at 09:52

## 2024-03-10 RX ADMIN — ACETAMINOPHEN 650 MG: 325 TABLET ORAL at 10:13

## 2024-03-10 RX ADMIN — Medication 5000 UNITS: at 09:50

## 2024-03-10 RX ADMIN — SODIUM BICARBONATE 650 MG: 650 TABLET ORAL at 09:51

## 2024-03-10 RX ADMIN — CARVEDILOL 6.25 MG: 6.25 TABLET, FILM COATED ORAL at 09:51

## 2024-03-10 RX ADMIN — Medication 10 ML: at 21:21

## 2024-03-10 RX ADMIN — LANSOPRAZOLE 15 MG: 30 TABLET, ORALLY DISINTEGRATING, DELAYED RELEASE ORAL at 06:46

## 2024-03-10 ASSESSMENT — PAIN DESCRIPTION - LOCATION: LOCATION: BACK;HIP

## 2024-03-10 ASSESSMENT — PAIN SCALES - GENERAL
PAINLEVEL_OUTOF10: 8
PAINLEVEL_OUTOF10: 8

## 2024-03-10 ASSESSMENT — PAIN DESCRIPTION - PAIN TYPE: TYPE: CHRONIC PAIN

## 2024-03-10 ASSESSMENT — PAIN DESCRIPTION - DESCRIPTORS: DESCRIPTORS: ACHING

## 2024-03-10 ASSESSMENT — PAIN DESCRIPTION - ORIENTATION: ORIENTATION: LEFT;MID

## 2024-03-10 ASSESSMENT — PAIN DESCRIPTION - FREQUENCY: FREQUENCY: CONTINUOUS

## 2024-03-10 NOTE — PLAN OF CARE
Problem: Discharge Planning  Goal: Discharge to home or other facility with appropriate resources  Outcome: Progressing     Problem: Discharge Planning  Goal: Discharge to home or other facility with appropriate resources  Outcome: Progressing     Problem: Pain  Goal: Verbalizes/displays adequate comfort level or baseline comfort level  Outcome: Progressing  Flowsheets  Taken 3/9/2024 1926 by Angie Arellano, RN  Verbalizes/displays adequate comfort level or baseline comfort level:   Encourage patient to monitor pain and request assistance   Assess pain using appropriate pain scale  Taken 3/9/2024 1717 by Angie Arellano, RN  Verbalizes/displays adequate comfort level or baseline comfort level:   Encourage patient to monitor pain and request assistance   Assess pain using appropriate pain scale  Taken 3/9/2024 1357 by Angie Arellano, RN  Verbalizes/displays adequate comfort level or baseline comfort level:   Encourage patient to monitor pain and request assistance   Assess pain using appropriate pain scale     Problem: Safety - Adult  Goal: Free from fall injury  Outcome: Progressing     Problem: Chronic Conditions and Co-morbidities  Goal: Patient's chronic conditions and co-morbidity symptoms are monitored and maintained or improved  Outcome: Progressing     Problem: Genitourinary - Adult  Goal: Absence of urinary retention  Outcome: Progressing     Problem: Gastrointestinal - Adult  Goal: Minimal or absence of nausea and vomiting  Outcome: Progressing  Goal: Maintains or returns to baseline bowel function  Outcome: Progressing     Problem: Infection - Adult  Goal: Absence of infection at discharge  Outcome: Progressing

## 2024-03-10 NOTE — FLOWSHEET NOTE
03/10/24 1840   Observations & Evaluations   Level of Consciousness 0   Oriented X 4   Respiratory Quality/Effort Unlabored   O2 Device None (Room air)   Skin Condition/Temp Dry;Warm   Edema None   Peritoneal Dialysis Catheter Mid lower abdomen   No placement date or time found.   Catheter Location: Mid lower abdomen   Status Accessed   Site Condition Clean, dry, intact   Dressing Status New dressing applied   Dressing Gauze   Date of Last Dressing Change 03/10/24   Dialysis Type Continuous cycling   Exit Site Condition good   Catheter Care Given Yes   Cycler   Verification of Prescription CCPD   Informed Consent  Yes   Total Volume Programmed 98992 mL   Therapy Time (Hours:Minutes) 9:30   Cycler Type Jung HomeChoice   Fill Volume 2500 mL   Last Fill Volume 0 mL   Dextrose Setting Same (Nonextraneal)   I Drain Alarm 0 mL   Number of Cycles 5   Bag Volume 6000 mL   Number of Bags Used 3   Dianeal Solution   (2.5% dextrose in 6000 ml x 3)     Time Out (time): 1830  Primary RN SBAR: Angie Arellano, RN    Patient Education: access care, procedure    CCPD tx initiated    Hepatitis B Surface Ag   Date/Time Value Ref Range Status   02/26/2024 04:19 AM <0.10 Index Final     Hep B S Ab   Date/Time Value Ref Range Status   02/29/2024 07:51 AM <3.10 mIU/mL Final

## 2024-03-10 NOTE — FLOWSHEET NOTE
CCPD Disconnect: 03/10/24 0700   Vitals   BP (!) 133/59   Pulse 88   Respirations (!) 34   SpO2 99 %   Observations & Evaluations   Level of Consciousness 0   Oriented X 3   Heart Rhythm Regular   Respiratory Quality/Effort Unlabored   O2 Device None (Room air)   Bilateral Breath Sounds Clear   Skin Condition/Temp Warm;Dry   Abdomen Inspection Soft;Obese   Edema None   Peritoneal Dialysis Catheter Mid lower abdomen   No placement date or time found.   Catheter Location: Mid lower abdomen   Status Deaccessed   Dressing Status Clean, dry & intact   Dressing Gauze   Date of Last Dressing Change 03/09/24   Dialysis Type Continuous cycling   Catheter Care Given   (two minute alcavis scrub and soak)   Post-Treatment (Cycler)   Average Dwell Time (Hours:Minutes) 1:19   Lost Dwell Time (Hours:Minutes) 0:14   Effluent Appearance Clear;Yellow   I Drain (mL) 213 mL   PD Output (mL) 1077 mL  (idrain 213 + total )   Primary RN SBAR: EVELYN Ren RN  Comments: Therapy ended, no alarms noted. Old cassette and bags discarded into red biohazard bag. Call bell within reach.

## 2024-03-11 LAB
ALBUMIN SERPL-MCNC: 1.3 G/DL (ref 3.5–5)
ALBUMIN/GLOB SERPL: 0.3 (ref 1.1–2.2)
ALP SERPL-CCNC: 133 U/L (ref 45–117)
ALT SERPL-CCNC: 17 U/L (ref 12–78)
ANION GAP SERPL CALC-SCNC: 6 MMOL/L (ref 5–15)
AST SERPL-CCNC: 16 U/L (ref 15–37)
BILIRUB DIRECT SERPL-MCNC: 0.1 MG/DL (ref 0–0.2)
BILIRUB SERPL-MCNC: 0.4 MG/DL (ref 0.2–1)
BUN SERPL-MCNC: 41 MG/DL (ref 6–20)
BUN/CREAT SERPL: 9 (ref 12–20)
CALCIUM SERPL-MCNC: 7.1 MG/DL (ref 8.5–10.1)
CHLORIDE SERPL-SCNC: 102 MMOL/L (ref 97–108)
CO2 SERPL-SCNC: 32 MMOL/L (ref 21–32)
CREAT SERPL-MCNC: 4.41 MG/DL (ref 0.55–1.02)
ERYTHROCYTE [DISTWIDTH] IN BLOOD BY AUTOMATED COUNT: 20.1 % (ref 11.5–14.5)
GLOBULIN SER CALC-MCNC: 4.4 G/DL (ref 2–4)
GLUCOSE BLD STRIP.AUTO-MCNC: 135 MG/DL (ref 65–117)
GLUCOSE BLD STRIP.AUTO-MCNC: 166 MG/DL (ref 65–117)
GLUCOSE BLD STRIP.AUTO-MCNC: 224 MG/DL (ref 65–117)
GLUCOSE BLD STRIP.AUTO-MCNC: 99 MG/DL (ref 65–117)
GLUCOSE SERPL-MCNC: 229 MG/DL (ref 65–100)
HCT VFR BLD AUTO: 25.3 % (ref 35–47)
HGB BLD-MCNC: 7.3 G/DL (ref 11.5–16)
MAGNESIUM SERPL-MCNC: 1.7 MG/DL (ref 1.6–2.4)
MCH RBC QN AUTO: 29.4 PG (ref 26–34)
MCHC RBC AUTO-ENTMCNC: 28.9 G/DL (ref 30–36.5)
MCV RBC AUTO: 102 FL (ref 80–99)
NRBC # BLD: 0 K/UL (ref 0–0.01)
NRBC BLD-RTO: 0 PER 100 WBC
PHOSPHATE SERPL-MCNC: 3.3 MG/DL (ref 2.6–4.7)
PLATELET # BLD AUTO: 241 K/UL (ref 150–400)
PMV BLD AUTO: 10.4 FL (ref 8.9–12.9)
POTASSIUM SERPL-SCNC: 3.7 MMOL/L (ref 3.5–5.1)
PROT SERPL-MCNC: 5.7 G/DL (ref 6.4–8.2)
RBC # BLD AUTO: 2.48 M/UL (ref 3.8–5.2)
SERVICE CMNT-IMP: ABNORMAL
SERVICE CMNT-IMP: NORMAL
SODIUM SERPL-SCNC: 140 MMOL/L (ref 136–145)
WBC # BLD AUTO: 11.2 K/UL (ref 3.6–11)

## 2024-03-11 PROCEDURE — 6370000000 HC RX 637 (ALT 250 FOR IP): Performed by: CLINICAL NURSE SPECIALIST

## 2024-03-11 PROCEDURE — 6370000000 HC RX 637 (ALT 250 FOR IP): Performed by: NURSE PRACTITIONER

## 2024-03-11 PROCEDURE — 84100 ASSAY OF PHOSPHORUS: CPT

## 2024-03-11 PROCEDURE — 97535 SELF CARE MNGMENT TRAINING: CPT

## 2024-03-11 PROCEDURE — 80048 BASIC METABOLIC PNL TOTAL CA: CPT

## 2024-03-11 PROCEDURE — 94640 AIRWAY INHALATION TREATMENT: CPT

## 2024-03-11 PROCEDURE — 6360000002 HC RX W HCPCS: Performed by: NURSE PRACTITIONER

## 2024-03-11 PROCEDURE — 2580000003 HC RX 258: Performed by: NURSE PRACTITIONER

## 2024-03-11 PROCEDURE — 6370000000 HC RX 637 (ALT 250 FOR IP): Performed by: STUDENT IN AN ORGANIZED HEALTH CARE EDUCATION/TRAINING PROGRAM

## 2024-03-11 PROCEDURE — 97116 GAIT TRAINING THERAPY: CPT

## 2024-03-11 PROCEDURE — 97530 THERAPEUTIC ACTIVITIES: CPT

## 2024-03-11 PROCEDURE — 80076 HEPATIC FUNCTION PANEL: CPT

## 2024-03-11 PROCEDURE — 85027 COMPLETE CBC AUTOMATED: CPT

## 2024-03-11 PROCEDURE — 90945 DIALYSIS ONE EVALUATION: CPT

## 2024-03-11 PROCEDURE — 6370000000 HC RX 637 (ALT 250 FOR IP): Performed by: HOSPITALIST

## 2024-03-11 PROCEDURE — 6370000000 HC RX 637 (ALT 250 FOR IP): Performed by: INTERNAL MEDICINE

## 2024-03-11 PROCEDURE — 83735 ASSAY OF MAGNESIUM: CPT

## 2024-03-11 PROCEDURE — 36415 COLL VENOUS BLD VENIPUNCTURE: CPT

## 2024-03-11 PROCEDURE — 82962 GLUCOSE BLOOD TEST: CPT

## 2024-03-11 PROCEDURE — 2060000000 HC ICU INTERMEDIATE R&B

## 2024-03-11 RX ADMIN — CARVEDILOL 6.25 MG: 6.25 TABLET, FILM COATED ORAL at 08:58

## 2024-03-11 RX ADMIN — SODIUM CHLORIDE, SODIUM LACTATE, CALCIUM CHLORIDE, MAGNESIUM CHLORIDE AND DEXTROSE 6000 ML: 2.5; 538; 448; 18.3; 5.08 INJECTION, SOLUTION INTRAPERITONEAL at 18:26

## 2024-03-11 RX ADMIN — INSULIN HUMAN 7 UNITS: 100 INJECTION, SUSPENSION SUBCUTANEOUS at 09:00

## 2024-03-11 RX ADMIN — APIXABAN 2.5 MG: 2.5 TABLET, FILM COATED ORAL at 08:58

## 2024-03-11 RX ADMIN — Medication 100 MG: at 08:59

## 2024-03-11 RX ADMIN — CARVEDILOL 6.25 MG: 6.25 TABLET, FILM COATED ORAL at 17:35

## 2024-03-11 RX ADMIN — DIBASIC SODIUM PHOSPHATE, MONOBASIC POTASSIUM PHOSPHATE AND MONOBASIC SODIUM PHOSPHATE 2 TABLET: 852; 155; 130 TABLET ORAL at 21:25

## 2024-03-11 RX ADMIN — DIBASIC SODIUM PHOSPHATE, MONOBASIC POTASSIUM PHOSPHATE AND MONOBASIC SODIUM PHOSPHATE 2 TABLET: 852; 155; 130 TABLET ORAL at 08:58

## 2024-03-11 RX ADMIN — DULOXETINE HYDROCHLORIDE 20 MG: 20 CAPSULE, DELAYED RELEASE ORAL at 08:58

## 2024-03-11 RX ADMIN — ARFORMOTEROL TARTRATE: 15 SOLUTION RESPIRATORY (INHALATION) at 21:57

## 2024-03-11 RX ADMIN — ARFORMOTEROL TARTRATE: 15 SOLUTION RESPIRATORY (INHALATION) at 07:44

## 2024-03-11 RX ADMIN — Medication 10 ML: at 08:59

## 2024-03-11 RX ADMIN — Medication 10 ML: at 21:26

## 2024-03-11 RX ADMIN — ACETAMINOPHEN 650 MG: 325 TABLET ORAL at 21:40

## 2024-03-11 RX ADMIN — MONTELUKAST 10 MG: 10 TABLET, FILM COATED ORAL at 21:24

## 2024-03-11 RX ADMIN — ROSUVASTATIN CALCIUM 10 MG: 10 TABLET, FILM COATED ORAL at 21:25

## 2024-03-11 RX ADMIN — ASPIRIN 81 MG CHEWABLE TABLET 81 MG: 81 TABLET CHEWABLE at 08:58

## 2024-03-11 RX ADMIN — INSULIN HUMAN 18 UNITS: 100 INJECTION, SUSPENSION SUBCUTANEOUS at 18:40

## 2024-03-11 RX ADMIN — LANSOPRAZOLE 15 MG: 30 TABLET, ORALLY DISINTEGRATING, DELAYED RELEASE ORAL at 06:35

## 2024-03-11 RX ADMIN — APIXABAN 2.5 MG: 2.5 TABLET, FILM COATED ORAL at 21:24

## 2024-03-11 RX ADMIN — ACETAMINOPHEN 650 MG: 325 TABLET ORAL at 08:58

## 2024-03-11 RX ADMIN — Medication 3 MG: at 21:24

## 2024-03-11 RX ADMIN — DULOXETINE HYDROCHLORIDE 20 MG: 20 CAPSULE, DELAYED RELEASE ORAL at 21:25

## 2024-03-11 RX ADMIN — Medication 5000 UNITS: at 08:58

## 2024-03-11 ASSESSMENT — PAIN SCALES - GENERAL
PAINLEVEL_OUTOF10: 2
PAINLEVEL_OUTOF10: 8
PAINLEVEL_OUTOF10: 8

## 2024-03-11 ASSESSMENT — PAIN DESCRIPTION - DESCRIPTORS: DESCRIPTORS: ACHING

## 2024-03-11 ASSESSMENT — PAIN DESCRIPTION - LOCATION
LOCATION: BACK
LOCATION: HEAD

## 2024-03-11 ASSESSMENT — PAIN SCALES - WONG BAKER
WONGBAKER_NUMERICALRESPONSE: NO HURT
WONGBAKER_NUMERICALRESPONSE: 0

## 2024-03-11 ASSESSMENT — PAIN DESCRIPTION - ORIENTATION: ORIENTATION: MID

## 2024-03-11 NOTE — DIABETES MGMT
peritonitis and was d/c'd with abx. Since discharged persistent N/V.   CT: Large right retroperitoneal mass though has been present on several CT scans since 2021      HX:  Past Medical History:   Diagnosis Date    Asthma     CAD (coronary artery disease)     COPD (chronic obstructive pulmonary disease) ([facility])     PCP manages    CVA (cerebral vascular accident) ([facility]) 11/01/2023    Patient presented with right-sided weakness.  Per MRI of the brain on 11/1/2023, acute versus subacute embolic infarctions involving the left MCA territory and right PICA territory    Diabetes mellitus, type 2 ([facility])     ESRD on peritoneal dialysis ([facility])     Eduardo (Tammie-PD nurse) [place] [phone]- was on hemodialysis M-W-F Eduardo - now on PD    GERD (gastroesophageal reflux disease)     History of blood transfusion     Hyperlipidemia     Hypertension       INITIAL DX: Hypomagnesemia [E83.42]  Vomiting [R11.10]  Prolonged Q-T interval on ECG [R94.31]  Intractable nausea and vomiting [R11.2]     Current Treatment     TX: ASA/Eliquis. Pulm meds. BP & pain meds. PD. Epogen. Immodium. Insulin. Crestor. GI prophylaxis. Vitamins    Hospital Course   Clinical progress has been complicated by pulmonary edema & CKD.   3/5/24 A&O. No oral intake. CXR reflects some improvement in pulmonary edema but small left effusion present  3/6/24 Alert, but confused with some details. NG in place with bridle. Taking small amounts in orally. For Chest CT  3/8/24 Alert & oriented. Talking clearly. NG with TF. Eating very little; nutrition planned changed    Diabetes History   Type 2 diabetes on PD    Diabetes-related Medical History  Neurological complications  Peripheral neuropathy  Microvascular disease  Nephropathy-ESRD   Macrovascular disease  CAD and cerebral vascular accident  Other associated conditions                                     GERD/ HTN/HLD/COPD    Diabetes Medication History  Drug class Currently in use   GLP-1 RA [] Exenatide LAR  Afebrile  WBC normalized   Other:   Kidney function  Liver function   CKD - dialysis  Normal liver enzymes      Blood glucose pattern      Significant diabetes-related events  3/8/24 TF will switch to nocturnal feeds   3/11: AM NPH reduced for consistent low BG in afternoons-receiving nocturnal enteral feedings.    Assessment and Nursing Intervention   Nursing Diagnosis Risk for unstable blood glucose pattern   Nursing Intervention Domain 0260 Decision-making Support   Nursing Interventions Examined current inpatient diabetes/blood glucose control   Explored factors facilitating and impeding inpatient management  Explored corrective strategies with patient and responsible inpatient provider   Informed patient of rational for insulin strategy while hospitalized     Billing Code(s)   No charge    Before making these care recommendations, I personally reviewed the hospitalization record, including notes, laboratory & diagnostic data and current medications, and examined the patient at the bedside (circumstances permitting) before determining care. More than fifty (50) percent of the time was spent in patient counseling and/or care coordination.  Total minutes: <25    DAYA HARLEY - CNS  Clinical Nurse Specialist - Diabetes & endocrine disorders  [facility]  Access via [employer]

## 2024-03-11 NOTE — PROGRESS NOTES
Made second attempt to work with pt but she was working with OT, PT will follow and see as able and appropriate. Nataliya Salgado, PT

## 2024-03-11 NOTE — PROGRESS NOTES
[facility]      Nephrology  Note  Galilea Aguilar  Date of Admission : 2/21/2024    CC:  Follow up for ESRD       Assessment and Plan     ESRD on PD   - cont on CCPD 2.5%    - daily labs     Hypo-K, mag, Phos,ca  - 2/2 poor nutrition  - resume K-phos     Recent PD peritonitis  - cx positive for staph epi on 1/26  - Completed abx course; PD fluid neg here this admission    Hypoxia   - pulmonary following  - CT: Bilateral lower lobe atelectasis. Minimal pulmonary edema.   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 Renal cysts    Anemia of CKD  - Cont LIS 20K    Encephalopathy: resolved  Prolonged QT interval   HTN   DM  COPD  CAD         Interval History:  Patient seen and examined. No PD issues, drained 977cc. No vomiting this AM.  No cp, sob,n/v/d reported at this time.    Current Medications: all current  Medications have been eviewed in EPIC  Review of Systems: A comprehensive review of systems was negative.    Objective:  Vitals:    Vitals:    03/08/24 0553 03/08/24 0600 03/08/24 0700 03/08/24 0739   BP:  (!) 135/59 137/65    Pulse: 81 82 82 81   Resp:  18 (!) 34 19   Temp:       TempSrc:       SpO2:  98% 100% 100%   Weight:       Height:         Intake and Output:  No intake/output data recorded.  03/06 1901 - 03/08 0700  In: 240   Out: 2682     Physical Examination:  General: Alert and conversant this AM  HEENT Dobhoff in place- TF infusing  Resp:  Diminished posteriorly, no wheezing, on RA  CV:  Regular Rate/ Rhythm,  no  LE edema  GI:  Soft, NT, PD exit site dressing D/I.  Neurologic:  Oriented person/ place    LABS:  Recent Labs     03/08/24  0324 03/06/24  0039 03/05/24  1422 03/05/24  0050    136  137 138 136   K 3.3* 4.0  4.0 4.2 4.6    101  101 102 101   CO2 29 25  28 28 28   BUN 29* 26*   25* 26* 25*   CREATININE 4.72* 4.72*  4.68* 4.88* 4.70*   CALCIUM 7.8* 7.7*  7.7* 7.4* 7.1*   LABALBU 1.4* 1.5*  1.5* 1.5*  --    PHOS 2.3* 4.2  4.0 4.0 4.5   MG 1.8 1.8  --  1.8       Recent Labs     03/08/24  0324 03/07/24  0313 03/06/24  0039   WBC 10.9 9.7 12.9*   HGB 8.4* 8.0* 8.2*   HCT 29.4* 25.4* 25.6*    147* 168       No results for input(s): \"WINSTON\", \"KU\", \"CLU\", \"CREAU\" in the last 720 hours.    Invalid input(s): \"PROU\"  No results found for: \"SDES\"  No components found for: \"CULT\"      Review of Medical Records: I have reviewed all pertinent labs, chart notes, microbiology data, radiology imaging this patient.  Medications list personally reviewed.  No change in PMH ,family and social history from Consult note.      Loc Queen MD  [facility] [address], [facility] [place] [address]  Phone: [phone]   [phone]

## 2024-03-11 NOTE — FLOWSHEET NOTE
CCPD Disconnect: 03/11/24 0735   Observations & Evaluations   Level of Consciousness 0   Oriented X 4   Heart Rhythm Regular   Respiratory Quality/Effort Unlabored   O2 Device None (Room air)   Bilateral Breath Sounds Clear   Skin Condition/Temp Warm;Dry   Abdomen Inspection Soft   Edema None   Peritoneal Dialysis Catheter Mid lower abdomen   No placement date or time found.   Catheter Location: Mid lower abdomen   Status Deaccessed   Dressing Status Clean, dry & intact   Dressing Gauze   Date of Last Dressing Change 03/10/24   Dialysis Type Continuous cycling   Catheter Care Given Yes  (two minbute alcavis scrub/soak)   Post-Treatment (Cycler)   Average Dwell Time (Hours:Minutes) 1:21   Lost Dwell Time (Hours:Minutes) 0:07   Effluent Appearance Clear;Yellow   I Drain (mL) 206 mL   PD Output (mL) 977 mL  (idrain 206 + total )   Primary RN SBAR: HILL Webster RN  Comments: Therapy need, no alarms. Old cassette, lines and bags discarded into red bio-bag. Call bell within reach.

## 2024-03-11 NOTE — PLAN OF CARE
Problem: Discharge Planning  Goal: Discharge to home or other facility with appropriate resources  Flowsheets  Taken 3/10/2024 2118  Discharge to home or other facility with appropriate resources: Identify barriers to discharge with patient and caregiver  Taken 3/10/2024 0912  Discharge to home or other facility with appropriate resources: Identify barriers to discharge with patient and caregiver     Problem: Pain  Goal: Verbalizes/displays adequate comfort level or baseline comfort level  Recent Flowsheet Documentation  Taken 3/10/2024 1909 by Angie Arellano, RN  Verbalizes/displays adequate comfort level or baseline comfort level:   Encourage patient to monitor pain and request assistance   Assess pain using appropriate pain scale  Taken 3/10/2024 1732 by Angie Arellano, RN  Verbalizes/displays adequate comfort level or baseline comfort level:   Encourage patient to monitor pain and request assistance   Assess pain using appropriate pain scale  Taken 3/10/2024 1310 by Angie Arellano, RN  Verbalizes/displays adequate comfort level or baseline comfort level:   Encourage patient to monitor pain and request assistance   Assess pain using appropriate pain scale  Taken 3/10/2024 0912 by Angie Arellano RN  Verbalizes/displays adequate comfort level or baseline comfort level:   Encourage patient to monitor pain and request assistance   Assess pain using appropriate pain scale     Problem: Safety - Adult  Goal: Free from fall injury  Flowsheets (Taken 3/10/2024 2118)  Free From Fall Injury: Instruct family/caregiver on patient safety     Problem: Respiratory - Adult  Goal: Achieves optimal ventilation and oxygenation  Flowsheets (Taken 3/10/2024 2118)  Achieves optimal ventilation and oxygenation: Assess for changes in respiratory status     Problem: Skin/Tissue Integrity - Adult  Goal: Incisions, wounds, or drain sites healing without S/S of infection  Flowsheets (Taken 3/10/2024 2118)  Incisions, Wounds, or Drain Sites  Healing Without Sign and Symptoms of Infection: Implement wound care per orders     Problem: Musculoskeletal - Adult  Goal: Return mobility to safest level of function  Flowsheets  Taken 3/10/2024 2118  Return Mobility to Safest Level of Function: Assess patient stability and activity tolerance for standing, transferring and ambulating with or without assistive devices  Taken 3/10/2024 0912  Return Mobility to Safest Level of Function: Assess patient stability and activity tolerance for standing, transferring and ambulating with or without assistive devices

## 2024-03-11 NOTE — PLAN OF CARE
Problem: Discharge Planning  Goal: Discharge to home or other facility with appropriate resources  3/11/2024 0033 by Sewta Pavon RN  Outcome: Progressing  3/10/2024 2118 by Angie Arellano RN  Flowsheets  Taken 3/10/2024 2118  Discharge to home or other facility with appropriate resources: Identify barriers to discharge with patient and caregiver  Taken 3/10/2024 0912  Discharge to home or other facility with appropriate resources: Identify barriers to discharge with patient and caregiver     Problem: Pain  Goal: Verbalizes/displays adequate comfort level or baseline comfort level  Outcome: Progressing  Flowsheets  Taken 3/10/2024 1909 by Angie Arellano RN  Verbalizes/displays adequate comfort level or baseline comfort level:   Encourage patient to monitor pain and request assistance   Assess pain using appropriate pain scale  Taken 3/10/2024 1732 by Angie Arellano RN  Verbalizes/displays adequate comfort level or baseline comfort level:   Encourage patient to monitor pain and request assistance   Assess pain using appropriate pain scale  Taken 3/10/2024 1310 by Angie Arellano RN  Verbalizes/displays adequate comfort level or baseline comfort level:   Encourage patient to monitor pain and request assistance   Assess pain using appropriate pain scale     Problem: Safety - Adult  Goal: Free from fall injury  3/11/2024 0033 by Sweta Pavon RN  Outcome: Progressing  3/10/2024 2118 by Angie Arellano RN  Flowsheets (Taken 3/10/2024 2118)  Free From Fall Injury: Instruct family/caregiver on patient safety     Problem: Chronic Conditions and Co-morbidities  Goal: Patient's chronic conditions and co-morbidity symptoms are monitored and maintained or improved  3/11/2024 0033 by Sweta Pavon RN  Outcome: Progressing  3/10/2024 2118 by Angie Arellano RN  Flowsheets  Taken 3/10/2024 2118  Care Plan - Patient's Chronic Conditions and Co-Morbidity Symptoms are Monitored and Maintained or Improved: Monitor and assess

## 2024-03-11 NOTE — PLAN OF CARE
Problem: Occupational Therapy - Adult  Goal: By Discharge: Performs self-care activities at highest level of function for planned discharge setting.  See evaluation for individualized goals.  Description: FUNCTIONAL STATUS PRIOR TO ADMISSION:  Patient lives with her daughter who assists with bathing and lower body ADLs PRN, Mod I with RW for functional mobility, receiving  services.    Occupational Therapy Goals:  Weekly reassessment: all goals reviewed and all remain appropriate as stated below, continue to 3-18-24.   1.  Patient will perform grooming at the sink with Supervision within 7 day(s).   2.  Patient will perform bathing with Minimal Assist within 7 day(s).   3.  Patient will perform upper and lower body dressing with Supervision & AE PRN within 7 day(s).   4.  Patient will perform toilet transfers with Supervision  within 7 day(s).   5.  Patient will perform all aspects of toileting with Supervision within 7 day(s).   6.  Patient will participate in upper extremity therapeutic exercise/activities with Supervision for 5 minutes within 7 day(s).   7.  Patient will utilize energy conservation techniques during functional activities with verbal and visual cues within 7 day(s).    Initiated 2/23/2024: WEEKLY REASSESSMENT 03/04/2024  1.  Patient will perform grooming at the sink with Supervision within 7 day(s). (Cont. 03/04)  2.  Patient will perform bathing with Minimal Assist within 7 day(s). (Cont. 03/04)  3.  Patient will perform upper and lower body dressing with Supervision & AE PRN within 7 day(s). (Cont. 03/04)  4.  Patient will perform toilet transfers with Supervision  within 7 day(s). (Cont. 03/04)  5.  Patient will perform all aspects of toileting with Supervision within 7 day(s). (Cont. 03/04)  6.  Patient will participate in upper extremity therapeutic exercise/activities with Supervision for 5 minutes within 7 day(s). (Cont. 03/04)     7.  Patient will utilize energy conservation techniques

## 2024-03-11 NOTE — WOUND CARE
Wound Care Note:       Wound care follow up for left 5th toe wound and re-consulted for right medial thigh    Chart shows:  Admitted for vomiting  Past Medical History:   Diagnosis Date    Asthma     CAD (coronary artery disease)     COPD (chronic obstructive pulmonary disease) ([facility])     PCP manages    CVA (cerebral vascular accident) ([facility]) 11/01/2023    Patient presented with right-sided weakness.  Per MRI of the brain on 11/1/2023, acute versus subacute embolic infarctions involving the left MCA territory and right PICA territory    Diabetes mellitus, type 2 ([facility])     ESRD on peritoneal dialysis ([facility])     Eduardo (Tammie-PD nurse) [place] [phone]- was on hemodialysis M-W-F Eduardo - now on PD    GERD (gastroesophageal reflux disease)     History of blood transfusion     Hyperlipidemia     Hypertension    WBC = 11.2 on 3/11/24  Admitted from home    Assessment:   Patient is alert and talking, incontinent with no assistance needed in repositioning.    Bed: [place]  Diet: Adult diet regular; tube feeding  Patient reports 8/10 headache, RN notified.      Bilateral heels, buttocks, and sacral skin intact and without erythema.  Bilateral heels with dyschromia, patient's natural pigmentation.    Palpable DP pulses bilaterally.      1. Left 5th toe medial aspect with fissure is improving, approximately 0.4 cm x 0.2 cm x 0.1 cm, wound bed is pink, wound edges are open.  Z guard paste applied.    2.  Right medial thigh with small wound, moisture associated skin damage, approximately 0.4 cm x 0.4 cm x 0.1 cm, wound bed is pink, wound edges are open, neha-wound intact.  Zinc oxide applied.    Spoke with Dr. Anand, wound care orders obtained.    Patient repositioned to the left.  Heels offloaded on pillows.     Recommendations:    Left medial left toe at base and right medial thigh- Every 12 hours apply zinc oxide (orange tube).    Skin Care & Pressure

## 2024-03-11 NOTE — DIALYSIS
03/11/24 1815   Vitals   /64   Temp 98.7 °F (37.1 °C)   Pulse 98   Respirations 20   Observations & Evaluations   Level of Consciousness 0   Oriented X 4   Heart Rhythm Regular   Respiratory Quality/Effort Unlabored   O2 Device None (Room air)   Skin Condition/Temp Warm;Dry   Abdomen Inspection Soft   Edema None   Peritoneal Dialysis Catheter Mid lower abdomen   No placement date or time found.   Catheter Location: Mid lower abdomen   Status Accessed   Site Condition Clean, dry, intact   Dressing Status Clean, dry & intact   Dressing Gauze   Date of Last Dressing Change 03/11/24   Dialysis Type Continuous cycling   Exit Site Condition excellent   Catheter Care Given Yes  (two minbute alcavis scrub/soak)   Cycler   Verification of Prescription CCPD   Informed Consent  Yes   Total Volume Programmed 48826 mL   Therapy Time (Hours:Minutes) 9:30   Cycler Type Jung HomeChoice   Fill Volume 2500 mL   Last Fill Volume 0 mL   Dextrose Setting Same (Nonextraneal)   I Drain Alarm 0 mL   Number of Cycles 5   Bag Volume 6000 mL   Number of Bags Used 3   Dianeal Solution   (Dextrose 2.5% in 6000 mL)     Hepatitis B Surface Ag   Date/Time Value Ref Range Status   02/26/2024 04:19 AM <0.10 Index Final     Hep B S Ab   Date/Time Value Ref Range Status   02/29/2024 07:51 AM <3.10 mIU/mL Final      Time Out (time): 1800  Primary RN SBAR:  HILL Webster RN   Patient Education: Infection prevention  Comments: Remained present for completion of initial drain and initiation of first fill. Call bell within reach.    Start time: 1830 (3/11/24)  Est end time: 0400 (3/12/24)

## 2024-03-11 NOTE — PLAN OF CARE
[phone]- was on hemodialysis M-W- Eduardo - now on PD    GERD (gastroesophageal reflux disease)     History of blood transfusion     Hyperlipidemia     Hypertension      Past Surgical History:   Procedure Laterality Date    CARDIAC PROCEDURE N/A 2023    Left heart cath / coronary angiography performed by Chandan Leach MD at [facility] CARDIAC CATH LAB    COLONOSCOPY N/A 2023    COLONOSCOPY performed by Drew Pizarro MD at [facility] ENDOSCOPY    CORONARY ANGIOPLASTY WITH STENT PLACEMENT      multiple pt unsure of how many    CORONARY ARTERY BYPASS GRAFT N/A 2023    CORONARY ARTERY BYPASS GRAFTING X 3 WITH LIMA, L[facility], ECC, SONIA AND EPIAORTIC U/S BY DR YANCEY performed by Moose Gallegos MD at [facility] OPEN HEART    DELIVERY       HYSTERECTOMY (CERVIX STATUS UNKNOWN)      IR INSERT NON TUNNELED CV CATH LESS THAN 5 YEARS  2023    IR INSERT NON TUNNELED CV CATH LESS THAN 5 YEARS 2023 [facility] RAD ANGIO IR    IR NONTUNNELED VASCULAR CATHETER  12/3/2021    IR NONTUNNELED VASCULAR CATHETER 12/3/2021 [facility] RAD ANGIO IR    IR NONTUNNELED VASCULAR CATHETER  2021    IR TUNNELED CATHETER PLACEMENT GREATER THAN 5 YEARS  2021    IR TUNNELED CATHETER PLACEMENT GREATER THAN 5 YEARS 2021 [facility] RAD ANGIO IR    IR TUNNELED CATHETER PLACEMENT GREATER THAN 5 YEARS  2021    peritoneal dialysis    SD UNLISTED PROCEDURE CARDIAC SURGERY      UPPER GASTROINTESTINAL ENDOSCOPY N/A 2023    EGD BIOPSY performed by Drew Pizarro MD at [facility] ENDOSCOPY    UPPER GASTROINTESTINAL ENDOSCOPY N/A 10/17/2023    EGD ESOPHAGOGASTRODUODENOSCOPY performed by Jared Griffiths MD at [facility] ENDOSCOPY    VASCULAR SURGERY      leg stent       Home Situation:  Social/Functional History  Lives With: Family, Son, Daughter  Type of Home: House  Home Layout: One level  Home Access: Stairs to enter with rails  Entrance Stairs - Number of Steps: 3  Entrance Stairs - Rails: Both (too wide to reach at the same time)  Bathroom  Nathaly BARNHART, Mirta Chacon. Activity Measure for Post-Acute Care \"6-Clicks\" Basic Mobility Scores Predict Discharge Destination After Acute Care Hospitalization in Select Patient Groups: A Retrospective, Observational Study. Arch Rehabil Res Clin Transl. 2022 Jul 16;4(3):967862. doi: 10.1016/j.arrct.2022.594172. PMID: 94973021; PMCID: UZS0287643.  4. Nella TY, Zee S, Bernardo W, Mary P. AM-PAC Short Forms Manual 4.0. Revised 2/2020.                                                                                                                                                                                                                              Pain Rating:  None rated   Pain Intervention(s):       Activity Tolerance:   Good and SpO2 stable on room air, at 97% during spot checks during activity     After treatment:   Patient left in no apparent distress in bed, Call bell within reach, Bed/ chair alarm activated, Side rails x3, and with plan to get up with nursing assist for dinner    COMMUNICATION/EDUCATION:   The patient's plan of care was discussed with: occupational therapist and registered nurse    Patient Education  Education Given To: Patient  Education Provided: Role of Therapy;Plan of Care;Fall Prevention Strategies  Education Method: Verbal  Barriers to Learning: None  Education Outcome: Verbalized understanding    Thank you for this referral.  JEANETTE STOKES, PT  Minutes: 31

## 2024-03-11 NOTE — PROGRESS NOTES
Chart checked, pt cleared by nursing but pt currently has a headache and her nurse is about to provide medication. PT will follow and see as able and appropriate. Nataliya Salgado, PT

## 2024-03-11 NOTE — PROGRESS NOTES
Hospitalist Progress Note  Jak Aponte MD  Answering service: [phone] OR [phone] from in house phone        Date of Service:  3/10/2024  NAME:  Galilea Aguilar  :  1955  MRN:  [id number]      Admission Summary:     HPI: \"Galilea Aguilar is a 68 y.o. female with a history of CAD, COPD, CVA, diabetes, ESRD on peritoneal dialysis who presented to the ED with chief complaint of vomiting and left side/flank pain. In the ED, labs consistent with known renal dysfunction. QTc was found to be prolonged (554). CT demonstrated a large right retroperitoneal mass though has been present on several CT scans since . Patient treated with IV Valium for nausea and referred to hospitalists for further management.      Patient was seen and examined this morning in the ED, she was lying in bed in no acute distress -though did notice her having \"dry heaves\". Patient was recently admitted from  - 2024 for generalized abdominal pain, diagnosed with severe sepsis secondary to peritonitis and was discharged on vancomycin to be given through her peritoneal dialysis fluid and has also been prescribed Diflucan daily for the next month while on antibiotics.  She reports that since she has been discharged, she has had nausea and vomiting that has been persistent and has had difficulty keeping anything down.  Denies any headaches or dizziness, no chest pain or pressure, no shortness of breath or coughing.  Reports no diarrhea or constipation at home and has no abdominal pain.         Interval history / Subjective:       Patient seen & examined  Denies any nausea/ vomiting or abdominal pain  D/w RN , reports minimal po intake   Receiving TF at HS   Noted episodes of hypoglycemia   Receiving PD for ESRD     Assessment & Plan:     Intractable nausea and vomiting possible due to gastroparesis   Recent Peritonitis, completed abx

## 2024-03-12 LAB
GLUCOSE BLD STRIP.AUTO-MCNC: 199 MG/DL (ref 65–117)
GLUCOSE BLD STRIP.AUTO-MCNC: 220 MG/DL (ref 65–117)
GLUCOSE BLD STRIP.AUTO-MCNC: 223 MG/DL (ref 65–117)
GLUCOSE BLD STRIP.AUTO-MCNC: 240 MG/DL (ref 65–117)
GLUCOSE BLD STRIP.AUTO-MCNC: 242 MG/DL (ref 65–117)
SERVICE CMNT-IMP: ABNORMAL

## 2024-03-12 PROCEDURE — 6370000000 HC RX 637 (ALT 250 FOR IP): Performed by: INTERNAL MEDICINE

## 2024-03-12 PROCEDURE — 6370000000 HC RX 637 (ALT 250 FOR IP): Performed by: NURSE PRACTITIONER

## 2024-03-12 PROCEDURE — 97530 THERAPEUTIC ACTIVITIES: CPT

## 2024-03-12 PROCEDURE — 6370000000 HC RX 637 (ALT 250 FOR IP): Performed by: STUDENT IN AN ORGANIZED HEALTH CARE EDUCATION/TRAINING PROGRAM

## 2024-03-12 PROCEDURE — 6360000002 HC RX W HCPCS: Performed by: NURSE PRACTITIONER

## 2024-03-12 PROCEDURE — 2580000003 HC RX 258: Performed by: NURSE PRACTITIONER

## 2024-03-12 PROCEDURE — 94760 N-INVAS EAR/PLS OXIMETRY 1: CPT

## 2024-03-12 PROCEDURE — 99231 SBSQ HOSP IP/OBS SF/LOW 25: CPT | Performed by: CLINICAL NURSE SPECIALIST

## 2024-03-12 PROCEDURE — 82962 GLUCOSE BLOOD TEST: CPT

## 2024-03-12 PROCEDURE — 97535 SELF CARE MNGMENT TRAINING: CPT

## 2024-03-12 PROCEDURE — 94640 AIRWAY INHALATION TREATMENT: CPT

## 2024-03-12 PROCEDURE — 6370000000 HC RX 637 (ALT 250 FOR IP): Performed by: HOSPITALIST

## 2024-03-12 PROCEDURE — 6370000000 HC RX 637 (ALT 250 FOR IP): Performed by: CLINICAL NURSE SPECIALIST

## 2024-03-12 PROCEDURE — 2060000000 HC ICU INTERMEDIATE R&B

## 2024-03-12 RX ADMIN — INSULIN HUMAN 22 UNITS: 100 INJECTION, SUSPENSION SUBCUTANEOUS at 17:56

## 2024-03-12 RX ADMIN — DULOXETINE HYDROCHLORIDE 20 MG: 20 CAPSULE, DELAYED RELEASE ORAL at 21:57

## 2024-03-12 RX ADMIN — ROSUVASTATIN CALCIUM 10 MG: 10 TABLET, FILM COATED ORAL at 21:58

## 2024-03-12 RX ADMIN — DIBASIC SODIUM PHOSPHATE, MONOBASIC POTASSIUM PHOSPHATE AND MONOBASIC SODIUM PHOSPHATE 2 TABLET: 852; 155; 130 TABLET ORAL at 21:57

## 2024-03-12 RX ADMIN — Medication 100 MG: at 09:41

## 2024-03-12 RX ADMIN — DIBASIC SODIUM PHOSPHATE, MONOBASIC POTASSIUM PHOSPHATE AND MONOBASIC SODIUM PHOSPHATE 2 TABLET: 852; 155; 130 TABLET ORAL at 09:41

## 2024-03-12 RX ADMIN — ARFORMOTEROL TARTRATE: 15 SOLUTION RESPIRATORY (INHALATION) at 20:35

## 2024-03-12 RX ADMIN — CARVEDILOL 6.25 MG: 6.25 TABLET, FILM COATED ORAL at 17:13

## 2024-03-12 RX ADMIN — APIXABAN 2.5 MG: 2.5 TABLET, FILM COATED ORAL at 09:41

## 2024-03-12 RX ADMIN — Medication 3 MG: at 21:57

## 2024-03-12 RX ADMIN — CARVEDILOL 6.25 MG: 6.25 TABLET, FILM COATED ORAL at 09:41

## 2024-03-12 RX ADMIN — ARFORMOTEROL TARTRATE: 15 SOLUTION RESPIRATORY (INHALATION) at 07:06

## 2024-03-12 RX ADMIN — APIXABAN 2.5 MG: 2.5 TABLET, FILM COATED ORAL at 21:57

## 2024-03-12 RX ADMIN — ASPIRIN 81 MG CHEWABLE TABLET 81 MG: 81 TABLET CHEWABLE at 09:41

## 2024-03-12 RX ADMIN — LANSOPRAZOLE 15 MG: 30 TABLET, ORALLY DISINTEGRATING, DELAYED RELEASE ORAL at 06:41

## 2024-03-12 RX ADMIN — SODIUM CHLORIDE, SODIUM LACTATE, CALCIUM CHLORIDE, MAGNESIUM CHLORIDE AND DEXTROSE 6000 ML: 2.5; 538; 448; 18.3; 5.08 INJECTION, SOLUTION INTRAPERITONEAL at 14:49

## 2024-03-12 RX ADMIN — GENTAMICIN SULFATE: 1 CREAM TOPICAL at 14:49

## 2024-03-12 RX ADMIN — MONTELUKAST 10 MG: 10 TABLET, FILM COATED ORAL at 21:58

## 2024-03-12 RX ADMIN — Medication 10 ML: at 09:42

## 2024-03-12 RX ADMIN — DULOXETINE HYDROCHLORIDE 20 MG: 20 CAPSULE, DELAYED RELEASE ORAL at 09:40

## 2024-03-12 RX ADMIN — Medication 5000 UNITS: at 09:40

## 2024-03-12 RX ADMIN — INSULIN HUMAN 7 UNITS: 100 INJECTION, SUSPENSION SUBCUTANEOUS at 09:41

## 2024-03-12 RX ADMIN — INSULIN LISPRO 1 UNITS: 100 INJECTION, SOLUTION INTRAVENOUS; SUBCUTANEOUS at 17:56

## 2024-03-12 RX ADMIN — INSULIN LISPRO 1 UNITS: 100 INJECTION, SOLUTION INTRAVENOUS; SUBCUTANEOUS at 12:08

## 2024-03-12 ASSESSMENT — PAIN SCALES - WONG BAKER
WONGBAKER_NUMERICALRESPONSE: 0
WONGBAKER_NUMERICALRESPONSE: NO HURT

## 2024-03-12 ASSESSMENT — PAIN SCALES - GENERAL: PAINLEVEL_OUTOF10: 2

## 2024-03-12 NOTE — FLOWSHEET NOTE
Peritoneal Dialysis Disconnection / [phone]    Primary RN SBAR: JANES Boggs  Patient Education: access/site care    Hepatitis B Surface Ag   Date/Time Value Ref Range Status   02/26/2024 04:19 AM <0.10 Index Final     Hep B S Ab   Date/Time Value Ref Range Status   02/29/2024 07:51 AM <3.10 mIU/mL Final       03/12/24 0404   Peritoneal Dialysis Catheter Mid lower abdomen   No placement date or time found.   Catheter Location: Mid lower abdomen   Status Deaccessed   Site Condition Clean, dry, intact   Dressing Status Clean, dry & intact   Dressing Gauze   Date of Last Dressing Change 03/11/24   Dialysis Type Continuous cycling   Exit Site Condition excellent   Catheter Care Given Yes   Post-Treatment (Cycler)   Average Dwell Time (Hours:Minutes) 1:19   Lost Dwell Time (Hours:Minutes) 0:14   Effluent Appearance Clear;Yellow   PD Output (mL) 911 mL  (ID 49 ml +  ml = 911)     Miguelangel RN at bedside to disconnect pt from CCPD tx. Orders, consent, pt, and code status confirmed. Tx completed. Used cassette and bags discarded. Education and pre/post report given to primary RN.    Net fluid loss: 911 ml

## 2024-03-12 NOTE — PROGRESS NOTES
[facility]      Nephrology  Note  Galilea Aguilar  Date of Admission : 2/21/2024    CC:  Follow up for ESRD       Assessment and Plan     ESRD on PD   - cont on CCPD 2.5% - no issues  - daily labs     Hypo-K, mag, Phos,ca  - 2/2 poor nutrition    Recent PD peritonitis  - cx positive for staph epi on 1/26  - Completed abx course; PD fluid neg here this admission    Hypoxia   - pulmonary following  - CT: Bilateral lower lobe atelectasis. Minimal pulmonary edema.   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 Renal cysts    Anemia of CKD  - Cont LIS 20K    Encephalopathy: resolved  Prolonged QT interval   HTN   DM  COPD  CAD         Interval History:  Patient seen and examined. No PD issues, drained 911cc. No cp, sob,n/v/d reported at this time.    Current Medications: all current  Medications have been eviewed in EPIC  Review of Systems: A comprehensive review of systems was negative.    Objective:  Vitals:    Vitals:    03/12/24 0717 03/12/24 0941 03/12/24 1000 03/12/24 1144   BP: 135/75 133/66  (!) 145/64   Pulse: 89 89 90 93   Resp: 20   14   Temp: 98.1 °F (36.7 °C)   99 °F (37.2 °C)   TempSrc: Axillary   Oral   SpO2: 100%   95%   Weight:       Height:         Intake and Output:  No intake/output data recorded.  03/10 1901 - 03/12 0700  In: 880 [P.O.:120]  Out: 1888     Physical Examination:  General: Alert and conversant this AM  HEENT Dobhoff in place- TF infusing  Resp:  Diminished posteriorly, no wheezing, on RA  CV:  Regular Rate/ Rhythm,  no  LE edema  GI:  Soft, NT, PD exit site dressing D/I.  Neurologic:  Oriented person/ place    LABS:  Recent Labs     03/11/24  0445 03/08/24  0324 03/06/24  0039    140 136  137   K 3.7 3.3* 4.0  4.0    102 101  101   CO2 32 29 25  28   BUN 41* 29* 26*  25*    CREATININE 4.41* 4.72* 4.72*  4.68*   CALCIUM 7.1* 7.8* 7.7*  7.7*   LABALBU 1.3* 1.4* 1.5*  1.5*   PHOS 3.3 2.3* 4.2  4.0   MG 1.7 1.8 1.8     Recent Labs     03/11/24  0445 03/10/24  0558 03/10/24  0336   WBC 11.2* 12.4* 14.3*   HGB 7.3* 7.7* 6.7*   HCT 25.3* 25.3* 22.1*    217 247     No results for input(s): \"WINSTON\", \"KU\", \"CLU\", \"CREAU\" in the last 720 hours.    Invalid input(s): \"PROU\"  No results found for: \"SDES\"  No components found for: \"CULT\"      Review of Medical Records: I have reviewed all pertinent labs, chart notes, microbiology data, radiology imaging this patient.  Medications list personally reviewed.  No change in PMH ,family and social history from Consult note.      Ashkan Longo MD  [facility] [address], [place] [address]  Phone: [phone]   [phone]

## 2024-03-12 NOTE — PROGRESS NOTES
vomiting possible due to gastroparesis   Recent Peritonitis, completed abx treatment  Retroperitoneal Mass-possible lipoma  -CT abdomen no acute pathology has had unremarkable   -EGD October 2023    -dehydrated due to N/V  -Did not tolerate diet advancement from CLD to full transition, now back to clears,   -discontinued Marinol patient having fulminant hallucinations on it,    -scopolamine patch discontinued on 3/1 because of her ongoing confusion, on scheduled Zofran  -s/p Dobbhoff, started tube feeding on 2/29, I encourage her to eat more  -vomiting improved, prn antiemetics  -premedicated CT Abdomen pelvis with IV contrast  Slow-growing fatty retroperitoneal neoplasm on the right side is again noted. No significant soft tissue density elements. No evidence of infiltration or invasion. As described previously, this may represent a low-grade liposarcoma. Other considerations include atypical lipomatosis tumor or lipoma.Hepatic steatosis. There is an area of increased density in the left hepatic  lobe which may represent focal fatty sparing; however, other masses are not  excluded. Consider liver MRI with and without intravenous contrast.  -DTC on board  -GI on board  -Tolerating diet but appetite poor, minimal po intake  -Discussed with dietician, readjusted tube feeds, take dobhoff off when consistently finishing 50% of the tray        Acute metabolic encephalopathy--resolved  -CT head negative  -Marinol, scopolamine discontinued  -more lethargic, wakeful to voice but goes back to sleep, oriented to self, still confused,   -MRI of brain wo contrast 3/3 T2 hyperintense lesion in the left frontal periventricular white matter corresponding to the hypodensity noted on previous CT demonstrates patchy peripheral areas of T1 hyperintensity. This may represent subacute hemorrhage  around the previous lacunar infarct. Otherwise no acute intracranial abnormality  -improved, conscious and awake, well oriented and answered  interpreted patient's lab and all other diagnostic data    Notes reviewed from all clinical/nonclinical/nursing services involved in patient's clinical care. Care coordination discussions were held with appropriate clinical/nonclinical/ nursing providers based on care coordination needs.     Labs:     Recent Labs     03/10/24  0558 03/11/24 0445   WBC 12.4* 11.2*   HGB 7.7* 7.3*   HCT 25.3* 25.3*    241       Recent Labs     03/11/24 0445      K 3.7      CO2 32   BUN 41*   MG 1.7   PHOS 3.3       Recent Labs     03/11/24 0445   ALT 17   GLOB 4.4*       No results for input(s): \"INR\", \"APTT\" in the last 72 hours.    Invalid input(s): \"PTP\"   No results for input(s): \"TIBC\", \"FERR\" in the last 72 hours.    Invalid input(s): \"FE\", \"PSAT\"     No results found for: \"FOL\", \"RBCF\"   No results for input(s): \"PH\", \"PCO2\", \"PO2\" in the last 72 hours.  No results for input(s): \"CPK\" in the last 72 hours.    Invalid input(s): \"CPKMB\", \"CKNDX\", \"TROIQ\"  Lab Results   Component Value Date/Time    CHOL 123 12/14/2023 06:22 AM    HDL 38 12/14/2023 06:22 AM     Lab Results   Component Value Date/Time    GLUCPOC 159 01/09/2023 03:20 PM    GLUCPOC 166 01/09/2023 03:05 PM           Medications Reviewed:     Current Facility-Administered Medications   Medication Dose Route Frequency    insulin NPH (HumuLIN N;NovoLIN N) injection vial 7 Units  7 Units SubCUTAneous QAM    0.9 % sodium chloride infusion   IntraVENous PRN    epoetin mohan (EPOGEN;PROCRIT) injection 20,000 Units  20,000 Units SubCUTAneous Weekly    dianeal lo-brandi 2.5% 6,000 mL  6,000 mL IntraPERitoneal Daily before dinner    dianeal lo-brandi 2.5% 6,000 mL  6,000 mL IntraPERitoneal Daily before dinner    dianeal lo-brandi 2.5% 6,000 mL  6,000 mL IntraPERitoneal Daily before dinner    insulin NPH (HumuLIN N;NovoLIN N) injection vial 18 Units  18 Units SubCUTAneous QPM    lansoprazole (PREVACID SOLUTAB) disintegrating tablet 15 mg  15 mg Oral QAM AC    0.9 %

## 2024-03-12 NOTE — PROGRESS NOTES
Referral source:   Galilea Aguilar at [facility] in [facility] IM.  attended rounds in the IMCU as part of the Interdisciplinary team where the patient's ongoing care was discussed. I reviewed the medical record as part of this encounter.     Outcome: Interdisciplinary team are aware of  availability and were encouraged to request support as needed.      Advised nurse to contact [facility] for any further referrals.The  on-call can be reached at ([phone]).     Rev. Maria M Loo MDiv, [facility]  Staff

## 2024-03-12 NOTE — PLAN OF CARE
Problem: Safety - Adult  Goal: Free from fall injury  Outcome: Progressing     Problem: Nutrition Deficit:  Goal: Optimize nutritional status  Outcome: Progressing     Problem: Physical Therapy - Adult  Goal: By Discharge: Performs mobility at highest level of function for planned discharge setting.  See evaluation for individualized goals.  Description: FUNCTIONAL STATUS PRIOR TO ADMISSION: Patient was modified independent using a rolling walker for functional mobility, wheelchair for longer distances. Pt reported a fall a few days prior to this admission    HOME SUPPORT PRIOR TO ADMISSION: The patient lived with family and required assist for some ADLs.    Physical Therapy Goals  Revisited 3/11/2024, gains made goals not met but remain appropriate so carry over.        Physical Therapy Goals  Initiated 2/23/2024, reassessed and remain appropriate 03/04/24   1.  Patient will move from supine to sit and sit to supine and roll side to side in bed with modified independence within 7 day(s).    2.  Patient will perform sit to stand with modified independence within 7 day(s).  3.  Patient will transfer from bed to chair and chair to bed with modified independence using the least restrictive device within 7 day(s).  4.  Patient will ambulate with supervision/set-up for 50 feet with the least restrictive device within 7 day(s).   5.  Patient will ascend/descend 4 stairs with single handrail(s) with supervision/set-up within 7 day(s).    3/11/2024 1438 by Nataliya Salgado, PT  Outcome: Progressing     Problem: Occupational Therapy - Adult  Goal: By Discharge: Performs self-care activities at highest level of function for planned discharge setting.  See evaluation for individualized goals.  Description: FUNCTIONAL STATUS PRIOR TO ADMISSION:  Patient lives with her daughter who assists with bathing and lower body ADLs PRN, Mod I with RW for functional mobility, receiving  services.    Occupational Therapy Goals:  Weekly

## 2024-03-12 NOTE — PROGRESS NOTES
Hospitalist Progress Note  Stephanie Anand MD  Answering service: [phone] OR [phone] from in house phone        Date of Service:  3/12/2024  NAME:  Galilea Aguilar  :  1955  MRN:  [id number]      Admission Summary:     HPI: \"Galilea Aguilar is a 68 y.o. female with a history of CAD, COPD, CVA, diabetes, ESRD on peritoneal dialysis who presented to the ED with chief complaint of vomiting and left side/flank pain. In the ED, labs consistent with known renal dysfunction. QTc was found to be prolonged (554). CT demonstrated a large right retroperitoneal mass though has been present on several CT scans since . Patient treated with IV Valium for nausea and referred to hospitalists for further management.      Patient was seen and examined this morning in the ED, she was lying in bed in no acute distress -though did notice her having \"dry heaves\". Patient was recently admitted from  - 2024 for generalized abdominal pain, diagnosed with severe sepsis secondary to peritonitis and was discharged on vancomycin to be given through her peritoneal dialysis fluid and has also been prescribed Diflucan daily for the next month while on antibiotics.  She reports that since she has been discharged, she has had nausea and vomiting that has been persistent and has had difficulty keeping anything down.  Denies any headaches or dizziness, no chest pain or pressure, no shortness of breath or coughing.  Reports no diarrhea or constipation at home and has no abdominal pain.         Interval history / Subjective:   Ms. Andrade is in good spirits.  She thinks she is eating enough.  She is currently receiving nocturnal tube feeding via Dobbhoff.     Assessment & Plan:     Intractable nausea and vomiting possible due to gastroparesis   Recent Peritonitis, completed abx treatment  Retroperitoneal Mass-possible lipoma  -CT abdomen no acute

## 2024-03-12 NOTE — FLOWSHEET NOTE
03/12/24 1455   Vitals   BP (!) 126/54   Pulse 94   Respirations 16   SpO2 99 %   Observations & Evaluations   Level of Consciousness 0   Oriented X 4   Heart Rhythm Regular   Respiratory Quality/Effort Dyspnea at rest   O2 Device None (Room air)   Bilateral Breath Sounds Clear;Diminished   Skin Color   (appropriate for ethnicity)   Skin Condition/Temp Warm;Dry   Appetite Good   Abdomen Inspection Soft   Bowel Sounds (All Quadrants) Active   Edema None   Peritoneal Dialysis Catheter Mid lower abdomen   No placement date or time found.   Catheter Location: Mid lower abdomen   Site Condition Clean, dry, intact   Dressing Status New dressing applied   Dressing Gauze   Dialysis Type Continuous cycling   Exit Site Condition excellent   Catheter Care Given Yes   Cycler   Verification of Prescription CCPD   Informed Consent    (chronic consent applies)   Total Volume Programmed 67529 mL   Therapy Time (Hours:Minutes) 9:30   Cycler Type Jung HomeChoice   Fill Volume 2500 mL   Last Fill Volume 0 mL   I Drain Alarm 0 mL   Number of Cycles 5   Bag Volume 6000 mL   Number of Bags Used 3   Dianeal Solution   (3-2.5% Dextrose in 6000ml)        03/12/24 1455   Treatment   Time On 1500   Time Off 0030   Observations & Evaluations   Level of Consciousness 0   Oriented X 4   Heart Rhythm Regular   Respiratory Quality/Effort Dyspnea at rest   O2 Device None (Room air)   Bilateral Breath Sounds Clear;Diminished   Skin Color   (appropriate for ethnicity)   Skin Condition/Temp Warm;Dry   Appetite Good   Abdomen Inspection Soft   Bowel Sounds (All Quadrants) Active   Edema None   Vital Signs   BP (!) 126/54   Pulse 94   Respirations 16   SpO2 99 %   Pain Assessment   Pain Assessment None - Denies Pain   Technical Checks   ICEBOAT I;C;E;B;O;A;T     Primary RN SBAR: Jazmine Gudino Rn  Patient Education: Procedural, Reviewed plan of care  Hospital associated wait time; reason: n/a  Hepatitis B Surface Ag   Date/Time Value Ref Range

## 2024-03-12 NOTE — DIABETES MGMT
[facility]  DIABETES MANAGEMENT CONSULT    Re-consulted by Provider for advanced nursing evaluation and care for inpatient blood glucose management.    Evaluation and Action Plan   This 68 year old AA female was admitted 2/21/24 with nausea & vomiting. Patient has CKD and is on PD at home; this has continued in-house. Usual home insulin dosing consists of 10 units of total insulin per day. Patient was receiving TF for a few days, along with corrective insulin, with Bgs rising into the 200-300s. TF was stopped 3/4/24 in order to address pulmonary edema. PD useful in pulling extra fluid, but Bps were lowish. Given one-time dose of steroid & a pint of PRBCs. TF was not restarted until yesterday to encourage oral intake. Bgs have risen into the 200s. Dietary plan is to switch to nocturnal tube feedings beginning 6pm through 6am; hence, she will need an additional 10 units to override the expected BG rise from 135 grams of dextrose during that time frame.     3/12:  BG trends more stable today- no hypoglycemia on 3/11 after AM NPH adjusted.  Noted evening BG elevated and >200. Will adjust PM NPH to better cover for nocturnal enteral feeds. Patient reported she consumed turkey sandwich and soup for dinner on 3/11. Has not consumed breakfast for today yet.     Management Rationale Action Plan   Medication   Basal & nutritional needs Based on  []        Blood glucose pattern  []        Weight & BMI  []        Kidney dysfunction  [x]        Home diabetes regimen  Tube feedings     Continue NPH insulin 7 units in AM   Adjusted NPH insulin to 22 units at 6pm (to override TF at 1 unit of insulin per 5g carbs)   Corrective insulin Based on sensitivity  [x]        Low   []        Medium  []        High      Additional orders        Diabetes Discharge Plan   Medication  TBD     Referral  []        Outpatient diabetes education   Additional orders            Initial Presentation   Josrbeverly SHERICE Aguilar is a 68  y.o. female admitted 2/21/24 from ED after experiencing vomiting and left side/flank pain - ESRD-PD dependant. Recent admission 1/25-2/5 for abd pain/ sepsis secondary to peritonitis and was d/c'd with abx. Since discharged persistent N/V.   CT: Large right retroperitoneal mass though has been present on several CT scans since 2021      HX:  Past Medical History:   Diagnosis Date    Asthma     CAD (coronary artery disease)     COPD (chronic obstructive pulmonary disease) ([facility])     PCP manages    CVA (cerebral vascular accident) ([facility]) 11/01/2023    Patient presented with right-sided weakness.  Per MRI of the brain on 11/1/2023, acute versus subacute embolic infarctions involving the left MCA territory and right PICA territory    Diabetes mellitus, type 2 ([facility])     ESRD on peritoneal dialysis ([facility])     Eduardo (Tammie-PD nurse) [facility] [phone]- was on hemodialysis M-W-F Eduardo - now on PD    GERD (gastroesophageal reflux disease)     History of blood transfusion     Hyperlipidemia     Hypertension       INITIAL DX: Hypomagnesemia [E83.42]  Vomiting [R11.10]  Prolonged Q-T interval on ECG [R94.31]  Intractable nausea and vomiting [R11.2]     Current Treatment     TX: ASA/Eliquis. Pulm meds. BP & pain meds. PD. Epogen. Immodium. Insulin. Crestor. GI prophylaxis. Vitamins    Hospital Course   Clinical progress has been complicated by pulmonary edema & CKD.   3/5/24 A&O. No oral intake. CXR reflects some improvement in pulmonary edema but small left effusion present  3/6/24 Alert, but confused with some details. NG in place with bridle. Taking small amounts in orally. For Chest CT  3/8/24 Alert & oriented. Talking clearly. NG with TF. Eating very little; nutrition planned changed    Diabetes History   Type 2 diabetes on PD    Diabetes-related Medical History  Neurological complications  Peripheral neuropathy  Microvascular disease  Nephropathy-ESRD   Macrovascular disease  CAD and cerebral vascular accident  Other

## 2024-03-13 LAB
ABO + RH BLD: NORMAL
ALBUMIN SERPL-MCNC: 1.4 G/DL (ref 3.5–5)
ALBUMIN/GLOB SERPL: 0.3 (ref 1.1–2.2)
ALP SERPL-CCNC: 139 U/L (ref 45–117)
ALT SERPL-CCNC: 20 U/L (ref 12–78)
ANION GAP SERPL CALC-SCNC: 6 MMOL/L (ref 5–15)
AST SERPL-CCNC: 22 U/L (ref 15–37)
BILIRUB DIRECT SERPL-MCNC: 0.2 MG/DL (ref 0–0.2)
BILIRUB SERPL-MCNC: 0.5 MG/DL (ref 0.2–1)
BLD PROD TYP BPU: NORMAL
BLOOD BANK DISPENSE STATUS: NORMAL
BLOOD GROUP ANTIBODIES SERPL: NORMAL
BPU ID: NORMAL
BUN SERPL-MCNC: 49 MG/DL (ref 6–20)
BUN/CREAT SERPL: 11 (ref 12–20)
CALCIUM SERPL-MCNC: 7.2 MG/DL (ref 8.5–10.1)
CHLORIDE SERPL-SCNC: 104 MMOL/L (ref 97–108)
CO2 SERPL-SCNC: 30 MMOL/L (ref 21–32)
CREAT SERPL-MCNC: 4.4 MG/DL (ref 0.55–1.02)
CROSSMATCH RESULT: NORMAL
GLOBULIN SER CALC-MCNC: 4.1 G/DL (ref 2–4)
GLUCOSE BLD STRIP.AUTO-MCNC: 192 MG/DL (ref 65–117)
GLUCOSE BLD STRIP.AUTO-MCNC: 208 MG/DL (ref 65–117)
GLUCOSE BLD STRIP.AUTO-MCNC: 218 MG/DL (ref 65–117)
GLUCOSE BLD STRIP.AUTO-MCNC: 92 MG/DL (ref 65–117)
GLUCOSE SERPL-MCNC: 185 MG/DL (ref 65–100)
MAGNESIUM SERPL-MCNC: 1.7 MG/DL (ref 1.6–2.4)
PHOSPHATE SERPL-MCNC: 3.8 MG/DL (ref 2.6–4.7)
POTASSIUM SERPL-SCNC: 4.1 MMOL/L (ref 3.5–5.1)
PROT SERPL-MCNC: 5.5 G/DL (ref 6.4–8.2)
SERVICE CMNT-IMP: ABNORMAL
SERVICE CMNT-IMP: NORMAL
SODIUM SERPL-SCNC: 140 MMOL/L (ref 136–145)
SPECIMEN EXP DATE BLD: NORMAL
UNIT DIVISION: 0

## 2024-03-13 PROCEDURE — 6370000000 HC RX 637 (ALT 250 FOR IP): Performed by: NURSE PRACTITIONER

## 2024-03-13 PROCEDURE — 80048 BASIC METABOLIC PNL TOTAL CA: CPT

## 2024-03-13 PROCEDURE — 82962 GLUCOSE BLOOD TEST: CPT

## 2024-03-13 PROCEDURE — 2060000000 HC ICU INTERMEDIATE R&B

## 2024-03-13 PROCEDURE — 6360000002 HC RX W HCPCS: Performed by: NURSE PRACTITIONER

## 2024-03-13 PROCEDURE — 94640 AIRWAY INHALATION TREATMENT: CPT

## 2024-03-13 PROCEDURE — 2580000003 HC RX 258: Performed by: NURSE PRACTITIONER

## 2024-03-13 PROCEDURE — 84100 ASSAY OF PHOSPHORUS: CPT

## 2024-03-13 PROCEDURE — 80076 HEPATIC FUNCTION PANEL: CPT

## 2024-03-13 PROCEDURE — 36415 COLL VENOUS BLD VENIPUNCTURE: CPT

## 2024-03-13 PROCEDURE — 6370000000 HC RX 637 (ALT 250 FOR IP): Performed by: CLINICAL NURSE SPECIALIST

## 2024-03-13 PROCEDURE — 6370000000 HC RX 637 (ALT 250 FOR IP): Performed by: HOSPITALIST

## 2024-03-13 PROCEDURE — 83735 ASSAY OF MAGNESIUM: CPT

## 2024-03-13 PROCEDURE — 6370000000 HC RX 637 (ALT 250 FOR IP): Performed by: INTERNAL MEDICINE

## 2024-03-13 PROCEDURE — 2700000000 HC OXYGEN THERAPY PER DAY

## 2024-03-13 PROCEDURE — 6370000000 HC RX 637 (ALT 250 FOR IP): Performed by: STUDENT IN AN ORGANIZED HEALTH CARE EDUCATION/TRAINING PROGRAM

## 2024-03-13 RX ADMIN — Medication 100 MG: at 09:07

## 2024-03-13 RX ADMIN — ASPIRIN 81 MG CHEWABLE TABLET 81 MG: 81 TABLET CHEWABLE at 09:08

## 2024-03-13 RX ADMIN — CARVEDILOL 6.25 MG: 6.25 TABLET, FILM COATED ORAL at 17:10

## 2024-03-13 RX ADMIN — MONTELUKAST 10 MG: 10 TABLET, FILM COATED ORAL at 21:21

## 2024-03-13 RX ADMIN — SODIUM CHLORIDE, SODIUM LACTATE, CALCIUM CHLORIDE, MAGNESIUM CHLORIDE AND DEXTROSE 6000 ML: 2.5; 538; 448; 18.3; 5.08 INJECTION, SOLUTION INTRAPERITONEAL at 16:30

## 2024-03-13 RX ADMIN — ACETAMINOPHEN 650 MG: 325 TABLET ORAL at 09:07

## 2024-03-13 RX ADMIN — INSULIN LISPRO 1 UNITS: 100 INJECTION, SOLUTION INTRAVENOUS; SUBCUTANEOUS at 09:08

## 2024-03-13 RX ADMIN — DIBASIC SODIUM PHOSPHATE, MONOBASIC POTASSIUM PHOSPHATE AND MONOBASIC SODIUM PHOSPHATE 2 TABLET: 852; 155; 130 TABLET ORAL at 21:21

## 2024-03-13 RX ADMIN — APIXABAN 2.5 MG: 2.5 TABLET, FILM COATED ORAL at 09:08

## 2024-03-13 RX ADMIN — LANSOPRAZOLE 15 MG: 30 TABLET, ORALLY DISINTEGRATING, DELAYED RELEASE ORAL at 06:21

## 2024-03-13 RX ADMIN — Medication 10 ML: at 21:22

## 2024-03-13 RX ADMIN — DULOXETINE HYDROCHLORIDE 20 MG: 20 CAPSULE, DELAYED RELEASE ORAL at 09:07

## 2024-03-13 RX ADMIN — CARVEDILOL 6.25 MG: 6.25 TABLET, FILM COATED ORAL at 09:07

## 2024-03-13 RX ADMIN — Medication 3 MG: at 21:28

## 2024-03-13 RX ADMIN — ARFORMOTEROL TARTRATE: 15 SOLUTION RESPIRATORY (INHALATION) at 22:18

## 2024-03-13 RX ADMIN — DULOXETINE HYDROCHLORIDE 20 MG: 20 CAPSULE, DELAYED RELEASE ORAL at 21:21

## 2024-03-13 RX ADMIN — Medication 5000 UNITS: at 09:06

## 2024-03-13 RX ADMIN — ROSUVASTATIN CALCIUM 10 MG: 10 TABLET, FILM COATED ORAL at 21:27

## 2024-03-13 RX ADMIN — ARFORMOTEROL TARTRATE: 15 SOLUTION RESPIRATORY (INHALATION) at 08:48

## 2024-03-13 RX ADMIN — APIXABAN 2.5 MG: 2.5 TABLET, FILM COATED ORAL at 21:21

## 2024-03-13 RX ADMIN — GENTAMICIN SULFATE: 1 CREAM TOPICAL at 16:30

## 2024-03-13 RX ADMIN — INSULIN HUMAN 7 UNITS: 100 INJECTION, SUSPENSION SUBCUTANEOUS at 09:08

## 2024-03-13 RX ADMIN — Medication 10 ML: at 09:06

## 2024-03-13 RX ADMIN — DIBASIC SODIUM PHOSPHATE, MONOBASIC POTASSIUM PHOSPHATE AND MONOBASIC SODIUM PHOSPHATE 2 TABLET: 852; 155; 130 TABLET ORAL at 09:08

## 2024-03-13 ASSESSMENT — PAIN SCALES - GENERAL
PAINLEVEL_OUTOF10: 7
PAINLEVEL_OUTOF10: 0

## 2024-03-13 ASSESSMENT — PAIN DESCRIPTION - LOCATION: LOCATION: ABDOMEN

## 2024-03-13 ASSESSMENT — PAIN DESCRIPTION - DESCRIPTORS: DESCRIPTORS: ACHING

## 2024-03-13 ASSESSMENT — PAIN DESCRIPTION - ORIENTATION: ORIENTATION: LEFT

## 2024-03-13 NOTE — PROGRESS NOTES
[facility]      Nephrology  Note  Galilea Aguilar  Date of Admission : 2/21/2024    CC:  Follow up for ESRD       Assessment and Plan     ESRD on PD   - cont on CCPD 2.5% - no issues  - can do every other day labs     Hypo-K, mag, Phos,ca  - 2/2 poor nutrition    Recent PD peritonitis  - cx positive for staph epi on 1/26  - Completed abx course; PD fluid neg here this admission    Hypoxia   - pulmonary following  - CT: Bilateral lower lobe atelectasis. Minimal pulmonary edema.   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 Renal cysts    Anemia of CKD  - Cont LIS 20K    Encephalopathy: resolved  Prolonged QT interval   HTN   DM  COPD  CAD         Interval History:  Patient seen and examined. No PD issues, drained 1051cc. No cp, sob,n/v/d reported at this time. Claimed eating a little more    Current Medications: all current  Medications have been eviewed in EPIC  Review of Systems: A comprehensive review of systems was negative.    Objective:  Vitals:    Vitals:    03/13/24 1125 03/13/24 1128 03/13/24 1200 03/13/24 1400   BP:  122/63     Pulse:  92 92 83   Resp:  15     Temp: 98.4 °F (36.9 °C) 98.4 °F (36.9 °C)     TempSrc:  Oral     SpO2:  100%     Weight:       Height:         Intake and Output:  03/13 0701 - 03/13 1900  In: 30   Out: -   03/11 1901 - 03/13 0700  In: 180 [P.O.:120]  Out: 1962     Physical Examination:  General: Alert and conversant this AM  HEENT Dobhoff in place- TF infusing  Resp:  Diminished posteriorly, no wheezing, on RA  CV:  Regular Rate/ Rhythm,  no  LE edema  GI:  Soft, NT, PD exit site dressing D/I.  Neurologic:  Oriented person/ place    LABS:  Recent Labs     03/13/24  0128 03/11/24  0445 03/08/24  0324    140 140   K 4.1 3.7 3.3*    102 102   CO2 30 32 29   BUN 49* 41* 29*

## 2024-03-13 NOTE — FLOWSHEET NOTE
03/13/24 1630   Vitals   /63   Pulse 87   Respirations 16   SpO2 100 %   Observations & Evaluations   Level of Consciousness 0   Oriented X 3  (lethargy continues)   Respiratory Quality/Effort Unlabored   O2 Device None (Room air)   Bilateral Breath Sounds Clear   Skin Color   (appropriate for ethnicity)   Skin Condition/Temp Warm;Dry   Abdomen Inspection Soft   Bowel Sounds (All Quadrants) Active   Edema None   Peritoneal Dialysis Catheter Mid lower abdomen   No placement date or time found.   Catheter Location: Mid lower abdomen   Site Condition Clean, dry, intact   Dressing Status New dressing applied   Dressing Gauze   Date of Last Dressing Change 03/13/24   Dialysis Type Continuous cycling   Exit Site Condition excellent   Cycler   Verification of Prescription CCPD   Informed Consent    (chronic consent applies)   Therapy Time (Hours:Minutes) 9:30   Cycler Type Jung HomeChoice   Fill Volume 2500 mL   Last Fill Volume 0 mL   I Drain Alarm 0 mL   Number of Cycles 5   Bag Volume 6000 mL   Number of Bags Used 3   Dianeal Solution   (3-2.5% in 6000ml)        03/13/24 1630   Treatment   Time On 1630   Time Off 0200   Observations & Evaluations   Level of Consciousness 0   Oriented X 3  (lethargy continues)   Respiratory Quality/Effort Unlabored   O2 Device None (Room air)   Bilateral Breath Sounds Clear   Skin Color   (appropriate for ethnicity)   Skin Condition/Temp Warm;Dry   Abdomen Inspection Soft   Bowel Sounds (All Quadrants) Active   Edema None   Vital Signs   /63   Pulse 87   Respirations 16   SpO2 100 %   Pain Assessment   Pain Assessment None - Denies Pain   Pain Level 0   Technical Checks   ICEBOAT I;C;E;B;O;A;T     Primary RN ARSALANAR: HILL Brooks RN  Patient Education: Procedural, diet, line care  Hospital associated wait time; reason: n/a  Hepatitis B Surface Ag   Date/Time Value Ref Range Status   02/26/2024 04:19 AM <0.10 Index Final     Hep B S Ab   Date/Time Value Ref Range Status

## 2024-03-13 NOTE — PROGRESS NOTES
Comprehensive Nutrition Assessment    Type and Reason for Visit: Reassess    Nutrition Recommendations/Plan:     Continue regular diet.  Whole milk with all meals.  She will NOT drink supplements, so please do not order.    Pt has been tolerating PO and reportedly has been eating better (other than today, she is sleepy d/t restless night).    If she is able to take more PO this evening, could consider holding nocturnal feeds and pulling tube if she continues to tolerate diet.  If nocturnal feeds are held, please also hold her PM NPH dose    O/w, cyclic TF regimen remains Vital 1.5 @ 60 mL/hr x 12 hours from 6 PM to 6 AM + 1 packet Prosource daily and 30 mL H2o flushes q 4 hours while TF running         Malnutrition Assessment:  Malnutrition Status:  Moderate malnutrition (02/23/24 1431)    Context:  Chronic Illness     Findings of the 6 clinical characteristics of malnutrition:  Energy Intake:  Unable to assess  Weight Loss:  Greater than 7.5% over 3 months     Body Fat Loss:  Mild body fat loss Triceps   Muscle Mass Loss:  Unable to assess    Fluid Accumulation:  No significant fluid accumulation     Strength:  Not Performed       Nutrition Assessment:    Past Medical History:   Diagnosis Date    Asthma     CAD (coronary artery disease)     COPD (chronic obstructive pulmonary disease) ([facility])     PCP manages    CVA (cerebral vascular accident) ([facility]) 11/01/2023    Patient presented with right-sided weakness.  Per MRI of the brain on 11/1/2023, acute versus subacute embolic infarctions involving the left MCA territory and right PICA territory    Diabetes mellitus, type 2 ([facility])     ESRD on peritoneal dialysis ([facility])     Eduardo (Tammie-PD nurse) [facility] [phone]- was on hemodialysis M-W-F Eduardo - now on PD    GERD (gastroesophageal reflux disease)     History of blood transfusion     Hyperlipidemia     Hypertension        Pt admitted with Hypomagnesemia [E83.42]  Vomiting [R11.10]  Prolonged Q-T interval on ECG  consistently, or until next RD assessment    Nutrition Monitoring and Evaluation:   Behavioral-Environmental Outcomes: None Identified  Food/Nutrient Intake Outcomes: Diet Advancement/Tolerance, Food and Nutrient Intake, Enteral Nutrition Intake/Tolerance, Vitamin/Mineral Intake  Physical Signs/Symptoms Outcomes: Biochemical Data, GI Status, Nausea or Vomiting, Nutrition Focused Physical Findings, Weight    Discharge Planning:    Too soon to determine     Recent Labs     03/11/24  0445 03/13/24  0128   GLUCOSE 229* 185*   BUN 41* 49*   CREATININE 4.41* 4.40*    140   K 3.7 4.1    104   CO2 32 30   CALCIUM 7.1* 7.2*   PHOS 3.3 3.8   MG 1.7 1.7         Recent Labs     03/10/24  1722 03/10/24  1910 03/10/24  2120 03/10/24  2206 03/11/24  0832 03/11/24  1145 03/11/24  1651 03/11/24  2120 03/12/24  0049 03/12/24  0827 03/12/24  1200 03/12/24  1742 03/12/24  2133 03/13/24  0618 03/13/24  1205   POCGLU 57* 111 212* 234* 135* 99 166* 224* 223* 199* 220* 242* 240* 218* 92       Lab Results   Component Value Date/Time    LABA1C 5.5 03/01/2024 05:28 AM    LABA1C 5.9 12/14/2023 06:22 AM    LABA1C 6.6 11/02/2023 04:21 AM     03/01/2024 05:28 AM       Ernestina Lu RD  Available via [employer]

## 2024-03-13 NOTE — PROGRESS NOTES
Hospitalist Progress Note  Stephanie Anand MD  Answering service: [phone] OR [phone] from in house phone        Date of Service:  3/13/2024  NAME:  Galilea Agiular  :  1955  MRN:  [id number]      Admission Summary:     HPI: \"Galilea Aguilar is a 68 y.o. female with a history of CAD, COPD, CVA, diabetes, ESRD on peritoneal dialysis who presented to the ED with chief complaint of vomiting and left side/flank pain. In the ED, labs consistent with known renal dysfunction. QTc was found to be prolonged (554). CT demonstrated a large right retroperitoneal mass though has been present on several CT scans since . Patient treated with IV Valium for nausea and referred to hospitalists for further management.      Patient was seen and examined this morning in the ED, she was lying in bed in no acute distress -though did notice her having \"dry heaves\". Patient was recently admitted from  - 2024 for generalized abdominal pain, diagnosed with severe sepsis secondary to peritonitis and was discharged on vancomycin to be given through her peritoneal dialysis fluid and has also been prescribed Diflucan daily for the next month while on antibiotics.  She reports that since she has been discharged, she has had nausea and vomiting that has been persistent and has had difficulty keeping anything down.  Denies any headaches or dizziness, no chest pain or pressure, no shortness of breath or coughing.  Reports no diarrhea or constipation at home and has no abdominal pain.         Interval history / Subjective:   Ms. Andrade complains of diarrhea.She says she is eating well.  Plan to hold TF tonight and remove NGT tomorrow if she continue .     Assessment & Plan:     Intractable nausea and vomiting possible due to gastroparesis   Recent Peritonitis, completed abx treatment  Retroperitoneal Mass-possible lipoma  -CT abdomen no acute

## 2024-03-13 NOTE — PROGRESS NOTES
Physical Therapy    Attempted PT treatment. Pt received in chair following use of BSC with nursing assist. Reported fatigue following poor sleep last night and asked to defer further activity today; requested that PT notify OT as well. Will con't to follow.    Virginia Lu, PT, MPT

## 2024-03-13 NOTE — FLOWSHEET NOTE
Peritoneal Dialysis Disconnection / [phone]    Primary RN SBAR: Abdi, RN  Patient Education: access/site care    Hepatitis B Surface Ag   Date/Time Value Ref Range Status   02/26/2024 04:19 AM <0.10 Index Final     Hep B S Ab   Date/Time Value Ref Range Status   02/29/2024 07:51 AM <3.10 mIU/mL Final       03/13/24 0237   Peritoneal Dialysis Catheter Mid lower abdomen   No placement date or time found.   Catheter Location: Mid lower abdomen   Status Deaccessed   Site Condition Clean, dry, intact   Dressing Status Clean, dry & intact   Dressing Gauze   Date of Last Dressing Change 03/12/24   Dialysis Type Continuous cycling   Exit Site Condition excellent   Catheter Care Given Yes   Post-Treatment (Cycler)   Average Dwell Time (Hours:Minutes) 1:21   Lost Dwell Time (Hours:Minutes) 0:03   Effluent Appearance Clear;Yellow   PD Output (mL) 1051 mL  (ID 90 ml +  ml =1051)     Miguelangel RN at bedside to disconnect pt from CCPD tx. Orders, consent, pt, and code status confirmed. Tx completed. Used cassette and bags discarded. Education and pre/post report given to primary RN.    Net fluid loss: 1051 ml

## 2024-03-14 LAB
GLUCOSE BLD STRIP.AUTO-MCNC: 198 MG/DL (ref 65–117)
GLUCOSE BLD STRIP.AUTO-MCNC: 218 MG/DL (ref 65–117)
GLUCOSE BLD STRIP.AUTO-MCNC: 231 MG/DL (ref 65–117)
GLUCOSE BLD STRIP.AUTO-MCNC: 240 MG/DL (ref 65–117)
GLUCOSE BLD STRIP.AUTO-MCNC: 288 MG/DL (ref 65–117)
SERVICE CMNT-IMP: ABNORMAL

## 2024-03-14 PROCEDURE — 2580000003 HC RX 258: Performed by: NURSE PRACTITIONER

## 2024-03-14 PROCEDURE — 6370000000 HC RX 637 (ALT 250 FOR IP): Performed by: NURSE PRACTITIONER

## 2024-03-14 PROCEDURE — 2060000000 HC ICU INTERMEDIATE R&B

## 2024-03-14 PROCEDURE — 99232 SBSQ HOSP IP/OBS MODERATE 35: CPT | Performed by: CLINICAL NURSE SPECIALIST

## 2024-03-14 PROCEDURE — 90945 DIALYSIS ONE EVALUATION: CPT

## 2024-03-14 PROCEDURE — 6370000000 HC RX 637 (ALT 250 FOR IP): Performed by: INTERNAL MEDICINE

## 2024-03-14 PROCEDURE — 97530 THERAPEUTIC ACTIVITIES: CPT

## 2024-03-14 PROCEDURE — 94640 AIRWAY INHALATION TREATMENT: CPT

## 2024-03-14 PROCEDURE — 82962 GLUCOSE BLOOD TEST: CPT

## 2024-03-14 PROCEDURE — 6370000000 HC RX 637 (ALT 250 FOR IP): Performed by: HOSPITALIST

## 2024-03-14 PROCEDURE — 6360000002 HC RX W HCPCS: Performed by: NURSE PRACTITIONER

## 2024-03-14 PROCEDURE — 97116 GAIT TRAINING THERAPY: CPT

## 2024-03-14 PROCEDURE — 6370000000 HC RX 637 (ALT 250 FOR IP): Performed by: CLINICAL NURSE SPECIALIST

## 2024-03-14 PROCEDURE — 6370000000 HC RX 637 (ALT 250 FOR IP): Performed by: STUDENT IN AN ORGANIZED HEALTH CARE EDUCATION/TRAINING PROGRAM

## 2024-03-14 PROCEDURE — 2700000000 HC OXYGEN THERAPY PER DAY

## 2024-03-14 RX ADMIN — APIXABAN 2.5 MG: 2.5 TABLET, FILM COATED ORAL at 22:00

## 2024-03-14 RX ADMIN — CARVEDILOL 6.25 MG: 6.25 TABLET, FILM COATED ORAL at 10:07

## 2024-03-14 RX ADMIN — INSULIN HUMAN 7 UNITS: 100 INJECTION, SUSPENSION SUBCUTANEOUS at 22:10

## 2024-03-14 RX ADMIN — Medication 3 MG: at 22:09

## 2024-03-14 RX ADMIN — ARFORMOTEROL TARTRATE: 15 SOLUTION RESPIRATORY (INHALATION) at 07:06

## 2024-03-14 RX ADMIN — Medication 100 MG: at 10:07

## 2024-03-14 RX ADMIN — MONTELUKAST 10 MG: 10 TABLET, FILM COATED ORAL at 22:10

## 2024-03-14 RX ADMIN — ROSUVASTATIN CALCIUM 10 MG: 10 TABLET, FILM COATED ORAL at 22:10

## 2024-03-14 RX ADMIN — GENTAMICIN SULFATE: 1 CREAM TOPICAL at 16:38

## 2024-03-14 RX ADMIN — APIXABAN 2.5 MG: 2.5 TABLET, FILM COATED ORAL at 10:07

## 2024-03-14 RX ADMIN — Medication 5000 UNITS: at 10:14

## 2024-03-14 RX ADMIN — DIBASIC SODIUM PHOSPHATE, MONOBASIC POTASSIUM PHOSPHATE AND MONOBASIC SODIUM PHOSPHATE 2 TABLET: 852; 155; 130 TABLET ORAL at 22:10

## 2024-03-14 RX ADMIN — SODIUM CHLORIDE, SODIUM LACTATE, CALCIUM CHLORIDE, MAGNESIUM CHLORIDE AND DEXTROSE 6000 ML: 2.5; 538; 448; 18.3; 5.08 INJECTION, SOLUTION INTRAPERITONEAL at 16:38

## 2024-03-14 RX ADMIN — INSULIN LISPRO 1 UNITS: 100 INJECTION, SOLUTION INTRAVENOUS; SUBCUTANEOUS at 10:08

## 2024-03-14 RX ADMIN — LANSOPRAZOLE 15 MG: 30 TABLET, ORALLY DISINTEGRATING, DELAYED RELEASE ORAL at 06:34

## 2024-03-14 RX ADMIN — INSULIN LISPRO 1 UNITS: 100 INJECTION, SOLUTION INTRAVENOUS; SUBCUTANEOUS at 13:55

## 2024-03-14 RX ADMIN — CARVEDILOL 6.25 MG: 6.25 TABLET, FILM COATED ORAL at 17:50

## 2024-03-14 RX ADMIN — Medication 10 ML: at 22:10

## 2024-03-14 RX ADMIN — DIBASIC SODIUM PHOSPHATE, MONOBASIC POTASSIUM PHOSPHATE AND MONOBASIC SODIUM PHOSPHATE 2 TABLET: 852; 155; 130 TABLET ORAL at 10:07

## 2024-03-14 RX ADMIN — DULOXETINE HYDROCHLORIDE 20 MG: 20 CAPSULE, DELAYED RELEASE ORAL at 22:41

## 2024-03-14 RX ADMIN — Medication 10 ML: at 10:10

## 2024-03-14 RX ADMIN — DULOXETINE HYDROCHLORIDE 20 MG: 20 CAPSULE, DELAYED RELEASE ORAL at 10:07

## 2024-03-14 RX ADMIN — ACETAMINOPHEN 650 MG: 325 TABLET ORAL at 18:05

## 2024-03-14 RX ADMIN — ASPIRIN 81 MG CHEWABLE TABLET 81 MG: 81 TABLET CHEWABLE at 10:07

## 2024-03-14 RX ADMIN — INSULIN HUMAN 7 UNITS: 100 INJECTION, SUSPENSION SUBCUTANEOUS at 10:08

## 2024-03-14 RX ADMIN — ARFORMOTEROL TARTRATE: 15 SOLUTION RESPIRATORY (INHALATION) at 21:14

## 2024-03-14 ASSESSMENT — PAIN DESCRIPTION - DESCRIPTORS
DESCRIPTORS: ACHING

## 2024-03-14 ASSESSMENT — PAIN DESCRIPTION - ORIENTATION
ORIENTATION: LEFT

## 2024-03-14 ASSESSMENT — PAIN SCALES - GENERAL
PAINLEVEL_OUTOF10: 7
PAINLEVEL_OUTOF10: 3
PAINLEVEL_OUTOF10: 7

## 2024-03-14 ASSESSMENT — PAIN DESCRIPTION - LOCATION
LOCATION: BACK
LOCATION: HIP
LOCATION: BACK

## 2024-03-14 ASSESSMENT — PAIN - FUNCTIONAL ASSESSMENT: PAIN_FUNCTIONAL_ASSESSMENT: PREVENTS OR INTERFERES SOME ACTIVE ACTIVITIES AND ADLS

## 2024-03-14 ASSESSMENT — PAIN SCALES - WONG BAKER
WONGBAKER_NUMERICALRESPONSE: 0
WONGBAKER_NUMERICALRESPONSE: NO HURT

## 2024-03-14 ASSESSMENT — PAIN DESCRIPTION - PAIN TYPE: TYPE: CHRONIC PAIN

## 2024-03-14 NOTE — FLOWSHEET NOTE
Primary RN SBAR: EV Arevalo RN  Patient Education: Procedural    Hepatitis B Surface Ag   Date/Time Value Ref Range Status   02/26/2024 04:19 AM <0.10 Index Final     Hep B S Ab   Date/Time Value Ref Range Status   02/29/2024 07:51 AM <3.10 mIU/mL Final       03/14/24 1638   Vitals   /80   Pulse 73   Respirations 22   SpO2 99 %   Observations & Evaluations   Level of Consciousness 0   Oriented X 4   Heart Rhythm Regular   Respiratory Quality/Effort Unlabored   O2 Device None (Room air)   Skin Color Other (comment)  (appropriate)   Skin Condition/Temp Dry;Warm   Abdomen Inspection Soft   Edema None   Peritoneal Dialysis Catheter Mid lower abdomen   No placement date or time found.   Catheter Location: Mid lower abdomen   Status Accessed   Site Condition Clean, dry, intact   Dressing Status New dressing applied;Clean, dry & intact   Dressing Gauze   Date of Last Dressing Change 03/14/24   Dialysis Type Continuous cycling   Exit Site Condition good   Catheter Care Given Yes  (2 min alcavis scrub / soak)   Cycler   Verification of Prescription CCPD   Informed Consent  Yes   Total Volume Programmed 39224 mL   Therapy Time (Hours:Minutes) 9:30   Cycler Type Jung HomeChoice   Fill Volume 2500 mL   Last Fill Volume 0 mL   Dextrose Setting Same (Nonextraneal)   I Drain Alarm 0 mL   Number of Cycles 5   Bag Volume 6000 mL   Number of Bags Used 3   Dianeal Solution Other (Comment)  (2.5% Dextrose in 6000 mL)        03/14/24 1641   Pain Assessment   Pain Assessment None - Denies Pain   Treatment   Time On 1650   Time Off 0220   Technical Checks   All Connections Secure Yes   ICEBOAT I;C;E;B;O;A;T   Machine Alarm Self Test Completed;Passed     Pt orders,notes, labs and code status reviewed. Time out complete. CCPD initiated as per md order. Remained present during initial drain followed by initiation of first fill.   Upon departure, bed in lowest position, call bell and personal belongings within reach.

## 2024-03-14 NOTE — PROGRESS NOTES
Hospitalist Progress Note  Donell Lyon MD  Answering service: [phone] OR [phone] from in house phone        Date of Service:  3/14/2024  NAME:  Galilea Aguilar  :  1955  MRN:  [id number]      Admission Summary:     HPI: \"Galilea Aguilar is a 68 y.o. female with a history of CAD, COPD, CVA, diabetes, ESRD on peritoneal dialysis who presented to the ED with chief complaint of vomiting and left side/flank pain. In the ED, labs consistent with known renal dysfunction. QTc was found to be prolonged (554). CT demonstrated a large right retroperitoneal mass though has been present on several CT scans since . Patient treated with IV Valium for nausea and referred to hospitalists for further management.      Patient was seen and examined this morning in the ED, she was lying in bed in no acute distress -though did notice her having \"dry heaves\". Patient was recently admitted from  - 2024 for generalized abdominal pain, diagnosed with severe sepsis secondary to peritonitis and was discharged on vancomycin to be given through her peritoneal dialysis fluid and has also been prescribed Diflucan daily for the next month while on antibiotics.  She reports that since she has been discharged, she has had nausea and vomiting that has been persistent and has had difficulty keeping anything down.  Denies any headaches or dizziness, no chest pain or pressure, no shortness of breath or coughing.  Reports no diarrhea or constipation at home and has no abdominal pain.         Interval history / Subjective:   Follow recurrent nausea and vomiting  Tolerating diet  Dobhoff taken out 3/14  No new issues     Assessment & Plan:     Intractable nausea and vomiting possible due to gastroparesis   Recent Peritonitis, completed abx treatment  Retroperitoneal Mass-possible lipoma  -CT abdomen no acute pathology has had unremarkable   -EGD  reviewed and interpreted patient's lab and all other diagnostic data    Notes reviewed from all clinical/nonclinical/nursing services involved in patient's clinical care. Care coordination discussions were held with appropriate clinical/nonclinical/ nursing providers based on care coordination needs.     Labs:     No results for input(s): \"WBC\", \"HGB\", \"HCT\", \"PLT\" in the last 72 hours.    Recent Labs     03/13/24  0128      K 4.1      CO2 30   BUN 49*   MG 1.7   PHOS 3.8       Recent Labs     03/13/24  0128   ALT 20   GLOB 4.1*       No results for input(s): \"INR\", \"APTT\" in the last 72 hours.    Invalid input(s): \"PTP\"   No results for input(s): \"TIBC\", \"FERR\" in the last 72 hours.    Invalid input(s): \"FE\", \"PSAT\"     No results found for: \"FOL\", \"RBCF\"   No results for input(s): \"PH\", \"PCO2\", \"PO2\" in the last 72 hours.  No results for input(s): \"CPK\" in the last 72 hours.    Invalid input(s): \"CPKMB\", \"CKNDX\", \"TROIQ\"  Lab Results   Component Value Date/Time    CHOL 123 12/14/2023 06:22 AM    HDL 38 12/14/2023 06:22 AM     Lab Results   Component Value Date/Time    GLUCPOC 159 01/09/2023 03:20 PM    GLUCPOC 166 01/09/2023 03:05 PM           Medications Reviewed:     Current Facility-Administered Medications   Medication Dose Route Frequency    insulin NPH (HumuLIN N;NovoLIN N) injection vial 7 Units  7 Units SubCUTAneous 2 times per day    0.9 % sodium chloride infusion   IntraVENous PRN    epoetin mohan (EPOGEN;PROCRIT) injection 20,000 Units  20,000 Units SubCUTAneous Weekly    dianeal lo-brandi 2.5% 6,000 mL  6,000 mL IntraPERitoneal Daily before dinner    dianeal lo-brandi 2.5% 6,000 mL  6,000 mL IntraPERitoneal Daily before dinner    dianeal lo-brandi 2.5% 6,000 mL  6,000 mL IntraPERitoneal Daily before dinner    lansoprazole (PREVACID SOLUTAB) disintegrating tablet 15 mg  15 mg Oral QAM AC    0.9 % sodium chloride infusion   IntraVENous PRN    guaiFENesin (ROBITUSSIN) 100 MG/5ML liquid 400 mg  400 mg

## 2024-03-14 NOTE — PROGRESS NOTES
[facility]      Nephrology  Note  Galilea Aguilar  Date of Admission : 2/21/2024    CC:  Follow up for ESRD       Assessment and Plan     ESRD on PD   - cont on CCPD 2.5% - no issues  -Labs every other day    Hypo-K, mag, Phos,ca  - 2/2 poor nutrition    Recent PD peritonitis  - cx positive for staph epi on 1/26  - Completed abx course; PD fluid neg here this admission    Hypoxia   - pulmonary following  - CT: Bilateral lower lobe atelectasis. Minimal pulmonary edema.   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 Renal cysts    Anemia of CKD  - Cont LIS 20K    Encephalopathy: resolved  Prolonged QT interval   HTN   DM  COPD  CAD         Interval History:  Patient seen and examined. No PD issues      Current Medications: all current  Medications have been eviewed in EPIC  Review of Systems: A comprehensive review of systems was negative.    Objective:  Vitals:    Vitals:    03/14/24 0645 03/14/24 0700 03/14/24 1000 03/14/24 1001   BP: (!) 140/70 130/64 121/62    Pulse: (!) 102 98 94 89   Resp: 22 25 21    Temp:   99.2 °F (37.3 °C)    TempSrc:   Oral    SpO2: 99% 97% 94%    Weight:       Height:         Intake and Output:  No intake/output data recorded.  03/12 1901 - 03/14 0700  In: 360.2 [P.O.:240; I.V.:0.2]  Out: 1863     Physical Examination:  General: Alert and conversant this AM  HEENT Dobhoff in place- TF infusing  Resp:  Diminished posteriorly, no wheezing, on RA  CV:  Regular Rate/ Rhythm,  no  LE edema  GI:  Soft, NT, PD exit site dressing D/I.  Neurologic:  Oriented person/ place    LABS:  Recent Labs     03/13/24  0128 03/11/24  0445 03/08/24  0324    140 140   K 4.1 3.7 3.3*    102 102   CO2 30 32 29   BUN 49* 41* 29*   CREATININE 4.40* 4.41* 4.72*   CALCIUM 7.2* 7.1* 7.8*   LABALBU 1.4* 1.3*

## 2024-03-14 NOTE — PLAN OF CARE
Problem: Physical Therapy - Adult  Goal: By Discharge: Performs mobility at highest level of function for planned discharge setting.  See evaluation for individualized goals.  Description: FUNCTIONAL STATUS PRIOR TO ADMISSION: Patient was modified independent using a rolling walker for functional mobility, wheelchair for longer distances. Pt reported a fall a few days prior to this admission    HOME SUPPORT PRIOR TO ADMISSION: The patient lived with family and required assist for some ADLs.    Physical Therapy Goals  Revisited 3/11/2024, gains made goals not met but remain appropriate so carry over.        Physical Therapy Goals  Initiated 2/23/2024, reassessed and remain appropriate 03/04/24   1.  Patient will move from supine to sit and sit to supine and roll side to side in bed with modified independence within 7 day(s).    2.  Patient will perform sit to stand with modified independence within 7 day(s).  3.  Patient will transfer from bed to chair and chair to bed with modified independence using the least restrictive device within 7 day(s).  4.  Patient will ambulate with supervision/set-up for 50 feet with the least restrictive device within 7 day(s).   5.  Patient will ascend/descend 4 stairs with single handrail(s) with supervision/set-up within 7 day(s).    Outcome: Progressing   PHYSICAL THERAPY TREATMENT    Patient: Galilea Aguilar (68 y.o. female)  Date: 3/14/2024  Diagnosis: Hypomagnesemia [E83.42]  Vomiting [R11.10]  Prolonged Q-T interval on ECG [R94.31]  Intractable nausea and vomiting [R11.2] Vomiting      Precautions: Contact Precautions, Fall Risk                alarm      ASSESSMENT:  Patient continues to benefit from skilled PT services and is slowly progressing towards goals. Pt is limited by impaired standing balance and generalized weakness and fatigue. Pt received reporting need to have a BM and requested use of the bedside commode. Post use of the bedside commode she declined further

## 2024-03-14 NOTE — CARE COORDINATION
Transition Of Care:     Noted: Diet advancement pending. Nephrology, GI, ID, Pulm, PT/OT following. Therapy recommending SNF. The patient is a Peritoneal Dialysis patient from home and the only SNF's doing PD dialysis in the area are [facility] and [place]. CM called all district liaisons to inquire on SNF's accepting PD patients and these were the only two. CM called patient's daughter: Chaz and she declines the patient to go to these SNF's and will plan to take the patient home and resume home health with [facility]. [facility] has accepted. Transport TBD      [place] [facility]: [facility].   Address: [address] [place] [address], [address]  Phone: [phone], Fax: [phone]     RUR: 36% High  Prior Level of Functioning: Modified independent, uses a walker, cane and wheelchair, Family assists with bathing and dressing and [facility]  Disposition: Home with [facility] PT/OT  If SNF or IPR: Date FOC offered: 3/1/24  Date FOC received: Choice pending  Accepting facility: None. Patient's daughter declines SNF.   Date authorization started with reference number:   Date authorization received and expires:   Follow up appointments: NA  DME needed: The patient has dme: Walker, rollator, cane, wheelchair, shower chair.   Transportation at discharge: TBD  IM/IMM Medicare/ letter given: 2/23/24  Caregiver Contact: Son Mak Aguilar [phone]/Daughter Chaz Aguilar [phone]   Discharge Caregiver contacted prior to discharge? NA  Care Conference needed? No  Barriers to discharge: None     Gia Hardin RN/CRM  [phone]

## 2024-03-14 NOTE — PLAN OF CARE
Pt has improving PO intake, Alert and oriented x4. Held nocturnal tube feeds  Problem: Discharge Planning  Goal: Discharge to home or other facility with appropriate resources  Outcome: Progressing  Flowsheets (Taken 3/13/2024 2000)  Discharge to home or other facility with appropriate resources: Identify barriers to discharge with patient and caregiver     Problem: Pain  Goal: Verbalizes/displays adequate comfort level or baseline comfort level  Outcome: Progressing  Flowsheets (Taken 3/13/2024 1900)  Verbalizes/displays adequate comfort level or baseline comfort level: Encourage patient to monitor pain and request assistance     Problem: Safety - Adult  Goal: Free from fall injury  Outcome: Progressing     Problem: Chronic Conditions and Co-morbidities  Goal: Patient's chronic conditions and co-morbidity symptoms are monitored and maintained or improved  Outcome: Progressing  Flowsheets (Taken 3/13/2024 2000)  Care Plan - Patient's Chronic Conditions and Co-Morbidity Symptoms are Monitored and Maintained or Improved: Monitor and assess patient's chronic conditions and comorbid symptoms for stability, deterioration, or improvement     Problem: Skin/Tissue Integrity  Goal: Absence of new skin breakdown  Description: 1.  Monitor for areas of redness and/or skin breakdown  2.  Assess vascular access sites hourly  3.  Every 4-6 hours minimum:  Change oxygen saturation probe site  4.  Every 4-6 hours:  If on nasal continuous positive airway pressure, respiratory therapy assess nares and determine need for appliance change or resting period.  Outcome: Progressing     Problem: Respiratory - Adult  Goal: Achieves optimal ventilation and oxygenation  Outcome: Progressing  Flowsheets (Taken 3/13/2024 2000)  Achieves optimal ventilation and oxygenation: Assess for changes in respiratory status     Problem: Musculoskeletal - Adult  Goal: Return mobility to safest level of function  Outcome: Progressing  Flowsheets (Taken  3/13/2024 2000)  Return Mobility to Safest Level of Function:   Assess patient stability and activity tolerance for standing, transferring and ambulating with or without assistive devices   Assist with transfers and ambulation using safe patient handling equipment as needed     Problem: Gastrointestinal - Adult  Goal: Minimal or absence of nausea and vomiting  Outcome: Progressing  Flowsheets (Taken 3/13/2024 2000)  Minimal or absence of nausea and vomiting:   Administer IV fluids as ordered to ensure adequate hydration   Advance diet as tolerated, if ordered     Problem: Metabolic/Fluid and Electrolytes - Adult  Goal: Electrolytes maintained within normal limits  Outcome: Progressing  Flowsheets (Taken 3/13/2024 2000)  Electrolytes maintained within normal limits:   Monitor labs and assess patient for signs and symptoms of electrolyte imbalances   Administer electrolyte replacement as ordered   Instruct patient on fluid and nutrition restrictions as appropriate

## 2024-03-14 NOTE — FLOWSHEET NOTE
03/14/24 0700   Vitals   /64   Pulse 98   Respirations 25   SpO2 97 %   Observations & Evaluations   Level of Consciousness 0   Heart Rhythm Regular   Respiratory Quality/Effort Unlabored   O2 Device None (Room air)   Skin Condition/Temp Warm;Dry   Abdomen Inspection Soft   Edema None   Peritoneal Dialysis Catheter Mid lower abdomen   No placement date or time found.   Catheter Location: Mid lower abdomen   Status Deaccessed   Dressing Status Clean, dry & intact   Dressing Gauze   Date of Last Dressing Change 03/13/24   Catheter Care Given Yes   Post-Treatment (Cycler)   Average Dwell Time (Hours:Minutes) 1:21   Lost Dwell Time (Hours:Minutes) 0:03   Effluent Appearance Clear;Yellow   PD Output (mL) 812 mL  (id 28 + uf 336=535 NET UF)     TIMEOUT:  ICEBOAT @ 0661  Primary RN SBAR: KAMILAH Rivas RN   Comments: On arrival initial uf 291 patient reposition and manual drain performed, NET , therapy completed, on departure SBAR at bedside, bed in low position, side rails up x 2 with call within reach.

## 2024-03-14 NOTE — PROGRESS NOTES
03/14/24 1500 03/14/24 1513   Vitals   /60 (!) 107/59   MAP (Calculated) 77 75   BP Location Left upper arm Left upper arm   BP Upper/Lower Upper Upper   BP Method Automatic Automatic   Patient Position Semi fowlers Sitting  (post use of bedside commode)

## 2024-03-15 ENCOUNTER — HOME HEALTH ADMISSION (OUTPATIENT)
Dept: HOME HEALTH SERVICES | Facility: HOME HEALTH | Age: 69
End: 2024-03-15
Payer: MEDICARE

## 2024-03-15 PROBLEM — R41.82 ALTERED MENTAL STATUS: Status: RESOLVED | Noted: 2024-02-27 | Resolved: 2024-03-15

## 2024-03-15 PROBLEM — R11.10 VOMITING: Status: RESOLVED | Noted: 2024-02-22 | Resolved: 2024-03-15

## 2024-03-15 PROBLEM — Z51.5 PALLIATIVE CARE ENCOUNTER: Status: RESOLVED | Noted: 2024-02-27 | Resolved: 2024-03-15

## 2024-03-15 PROBLEM — E11.21 TYPE 2 DIABETES WITH NEPHROPATHY (HCC): Status: RESOLVED | Noted: 2024-02-23 | Resolved: 2024-03-15

## 2024-03-15 PROBLEM — E11.65 POORLY CONTROLLED DIABETES MELLITUS (HCC): Status: RESOLVED | Noted: 2024-03-04 | Resolved: 2024-03-15

## 2024-03-15 PROBLEM — R11.2 INTRACTABLE NAUSEA AND VOMITING: Status: RESOLVED | Noted: 2024-02-23 | Resolved: 2024-03-15

## 2024-03-15 PROBLEM — Z71.89 ADVANCED CARE PLANNING/COUNSELING DISCUSSION: Status: RESOLVED | Noted: 2024-02-27 | Resolved: 2024-03-15

## 2024-03-15 PROBLEM — R44.3 HALLUCINATIONS: Status: RESOLVED | Noted: 2024-03-01 | Resolved: 2024-03-15

## 2024-03-15 PROBLEM — G47.00 INSOMNIA: Status: RESOLVED | Noted: 2024-03-01 | Resolved: 2024-03-15

## 2024-03-15 LAB
ALBUMIN SERPL-MCNC: 1.4 G/DL (ref 3.5–5)
ALBUMIN/GLOB SERPL: 0.3 (ref 1.1–2.2)
ALP SERPL-CCNC: 121 U/L (ref 45–117)
ALT SERPL-CCNC: 21 U/L (ref 12–78)
ANION GAP SERPL CALC-SCNC: 7 MMOL/L (ref 5–15)
AST SERPL-CCNC: 21 U/L (ref 15–37)
BASOPHILS # BLD: 0.1 K/UL (ref 0–0.1)
BASOPHILS NFR BLD: 1 % (ref 0–1)
BILIRUB DIRECT SERPL-MCNC: 0.1 MG/DL (ref 0–0.2)
BILIRUB SERPL-MCNC: 0.3 MG/DL (ref 0.2–1)
BUN SERPL-MCNC: 57 MG/DL (ref 6–20)
BUN/CREAT SERPL: 11 (ref 12–20)
CALCIUM SERPL-MCNC: 7.3 MG/DL (ref 8.5–10.1)
CHLORIDE SERPL-SCNC: 102 MMOL/L (ref 97–108)
CO2 SERPL-SCNC: 33 MMOL/L (ref 21–32)
CREAT SERPL-MCNC: 5.11 MG/DL (ref 0.55–1.02)
DIFFERENTIAL METHOD BLD: ABNORMAL
EOSINOPHIL # BLD: 0.3 K/UL (ref 0–0.4)
EOSINOPHIL NFR BLD: 4 % (ref 0–7)
ERYTHROCYTE [DISTWIDTH] IN BLOOD BY AUTOMATED COUNT: 20.3 % (ref 11.5–14.5)
GLOBULIN SER CALC-MCNC: 4.7 G/DL (ref 2–4)
GLUCOSE BLD STRIP.AUTO-MCNC: 158 MG/DL (ref 65–117)
GLUCOSE BLD STRIP.AUTO-MCNC: 204 MG/DL (ref 65–117)
GLUCOSE BLD STRIP.AUTO-MCNC: 237 MG/DL (ref 65–117)
GLUCOSE BLD STRIP.AUTO-MCNC: 276 MG/DL (ref 65–117)
GLUCOSE SERPL-MCNC: 192 MG/DL (ref 65–100)
HCT VFR BLD AUTO: 20.5 % (ref 35–47)
HCT VFR BLD AUTO: 24.2 % (ref 35–47)
HGB BLD-MCNC: 6 G/DL (ref 11.5–16)
HGB BLD-MCNC: 7 G/DL (ref 11.5–16)
HISTORY CHECK: NORMAL
IMM GRANULOCYTES # BLD AUTO: 0 K/UL (ref 0–0.04)
IMM GRANULOCYTES NFR BLD AUTO: 0 % (ref 0–0.5)
LYMPHOCYTES # BLD: 1 K/UL (ref 0.8–3.5)
LYMPHOCYTES NFR BLD: 15 % (ref 12–49)
MAGNESIUM SERPL-MCNC: 1.7 MG/DL (ref 1.6–2.4)
MCH RBC QN AUTO: 29.3 PG (ref 26–34)
MCHC RBC AUTO-ENTMCNC: 29.3 G/DL (ref 30–36.5)
MCV RBC AUTO: 100 FL (ref 80–99)
MONOCYTES # BLD: 0.6 K/UL (ref 0–1)
MONOCYTES NFR BLD: 8 % (ref 5–13)
NEUTS SEG # BLD: 4.9 K/UL (ref 1.8–8)
NEUTS SEG NFR BLD: 72 % (ref 32–75)
NRBC # BLD: 0 K/UL (ref 0–0.01)
NRBC BLD-RTO: 0 PER 100 WBC
PHOSPHATE SERPL-MCNC: 6.9 MG/DL (ref 2.6–4.7)
PLATELET # BLD AUTO: 366 K/UL (ref 150–400)
PMV BLD AUTO: 9.8 FL (ref 8.9–12.9)
POTASSIUM SERPL-SCNC: 4.6 MMOL/L (ref 3.5–5.1)
PROT SERPL-MCNC: 6.1 G/DL (ref 6.4–8.2)
RBC # BLD AUTO: 2.05 M/UL (ref 3.8–5.2)
RBC MORPH BLD: ABNORMAL
SERVICE CMNT-IMP: ABNORMAL
SODIUM SERPL-SCNC: 142 MMOL/L (ref 136–145)
WBC # BLD AUTO: 6.9 K/UL (ref 3.6–11)

## 2024-03-15 PROCEDURE — 2580000003 HC RX 258: Performed by: NURSE PRACTITIONER

## 2024-03-15 PROCEDURE — 6370000000 HC RX 637 (ALT 250 FOR IP): Performed by: STUDENT IN AN ORGANIZED HEALTH CARE EDUCATION/TRAINING PROGRAM

## 2024-03-15 PROCEDURE — 94640 AIRWAY INHALATION TREATMENT: CPT

## 2024-03-15 PROCEDURE — 36415 COLL VENOUS BLD VENIPUNCTURE: CPT

## 2024-03-15 PROCEDURE — 83735 ASSAY OF MAGNESIUM: CPT

## 2024-03-15 PROCEDURE — 6360000002 HC RX W HCPCS: Performed by: NURSE PRACTITIONER

## 2024-03-15 PROCEDURE — 86901 BLOOD TYPING SEROLOGIC RH(D): CPT

## 2024-03-15 PROCEDURE — 6370000000 HC RX 637 (ALT 250 FOR IP): Performed by: CLINICAL NURSE SPECIALIST

## 2024-03-15 PROCEDURE — 2060000000 HC ICU INTERMEDIATE R&B

## 2024-03-15 PROCEDURE — 90945 DIALYSIS ONE EVALUATION: CPT

## 2024-03-15 PROCEDURE — 6370000000 HC RX 637 (ALT 250 FOR IP): Performed by: INTERNAL MEDICINE

## 2024-03-15 PROCEDURE — 82962 GLUCOSE BLOOD TEST: CPT

## 2024-03-15 PROCEDURE — 85018 HEMOGLOBIN: CPT

## 2024-03-15 PROCEDURE — 6370000000 HC RX 637 (ALT 250 FOR IP): Performed by: NURSE PRACTITIONER

## 2024-03-15 PROCEDURE — 86900 BLOOD TYPING SEROLOGIC ABO: CPT

## 2024-03-15 PROCEDURE — 99231 SBSQ HOSP IP/OBS SF/LOW 25: CPT | Performed by: CLINICAL NURSE SPECIALIST

## 2024-03-15 PROCEDURE — 86850 RBC ANTIBODY SCREEN: CPT

## 2024-03-15 PROCEDURE — 84100 ASSAY OF PHOSPHORUS: CPT

## 2024-03-15 PROCEDURE — 6370000000 HC RX 637 (ALT 250 FOR IP): Performed by: HOSPITALIST

## 2024-03-15 PROCEDURE — 36430 TRANSFUSION BLD/BLD COMPNT: CPT

## 2024-03-15 PROCEDURE — 86923 COMPATIBILITY TEST ELECTRIC: CPT

## 2024-03-15 PROCEDURE — P9016 RBC LEUKOCYTES REDUCED: HCPCS

## 2024-03-15 PROCEDURE — 2700000000 HC OXYGEN THERAPY PER DAY

## 2024-03-15 PROCEDURE — 80076 HEPATIC FUNCTION PANEL: CPT

## 2024-03-15 PROCEDURE — 85025 COMPLETE CBC W/AUTO DIFF WBC: CPT

## 2024-03-15 PROCEDURE — 85014 HEMATOCRIT: CPT

## 2024-03-15 PROCEDURE — 6360000002 HC RX W HCPCS: Performed by: INTERNAL MEDICINE

## 2024-03-15 PROCEDURE — 80048 BASIC METABOLIC PNL TOTAL CA: CPT

## 2024-03-15 RX ORDER — TROLAMINE SALICYLATE 10 G/100G
CREAM TOPICAL
Qty: 1 EACH | Refills: 1 | Status: SHIPPED | OUTPATIENT
Start: 2024-03-15 | End: 2024-03-22

## 2024-03-15 RX ORDER — LANOLIN ALCOHOL/MO/W.PET/CERES
100 CREAM (GRAM) TOPICAL DAILY
Qty: 30 TABLET | Refills: 3 | Status: SHIPPED | OUTPATIENT
Start: 2024-03-16

## 2024-03-15 RX ORDER — LIDOCAINE 4 G/G
1 PATCH TOPICAL DAILY
Qty: 14 EACH | Refills: 0 | Status: SHIPPED | OUTPATIENT
Start: 2024-03-16

## 2024-03-15 RX ORDER — GENTAMICIN SULFATE 1 MG/G
CREAM TOPICAL
Qty: 15 G | Refills: 0 | Status: SHIPPED | OUTPATIENT
Start: 2024-03-15

## 2024-03-15 RX ORDER — SODIUM CHLORIDE 9 MG/ML
INJECTION, SOLUTION INTRAVENOUS PRN
Status: DISCONTINUED | OUTPATIENT
Start: 2024-03-15 | End: 2024-03-16 | Stop reason: HOSPADM

## 2024-03-15 RX ORDER — VITAMIN B COMPLEX
5000 TABLET ORAL DAILY
Qty: 60 TABLET | Refills: 0 | Status: SHIPPED | OUTPATIENT
Start: 2024-03-16

## 2024-03-15 RX ADMIN — APIXABAN 2.5 MG: 2.5 TABLET, FILM COATED ORAL at 22:45

## 2024-03-15 RX ADMIN — SODIUM CHLORIDE, SODIUM LACTATE, CALCIUM CHLORIDE, MAGNESIUM CHLORIDE AND DEXTROSE 6000 ML: 2.5; 538; 448; 18.3; 5.08 INJECTION, SOLUTION INTRAPERITONEAL at 16:39

## 2024-03-15 RX ADMIN — Medication 3 MG: at 22:45

## 2024-03-15 RX ADMIN — GENTAMICIN SULFATE: 1 CREAM TOPICAL at 16:39

## 2024-03-15 RX ADMIN — DIBASIC SODIUM PHOSPHATE, MONOBASIC POTASSIUM PHOSPHATE AND MONOBASIC SODIUM PHOSPHATE 2 TABLET: 852; 155; 130 TABLET ORAL at 22:45

## 2024-03-15 RX ADMIN — CARVEDILOL 6.25 MG: 6.25 TABLET, FILM COATED ORAL at 17:23

## 2024-03-15 RX ADMIN — ARFORMOTEROL TARTRATE: 15 SOLUTION RESPIRATORY (INHALATION) at 20:37

## 2024-03-15 RX ADMIN — APIXABAN 2.5 MG: 2.5 TABLET, FILM COATED ORAL at 08:55

## 2024-03-15 RX ADMIN — INSULIN LISPRO 2 UNITS: 100 INJECTION, SOLUTION INTRAVENOUS; SUBCUTANEOUS at 13:03

## 2024-03-15 RX ADMIN — LANSOPRAZOLE 15 MG: 30 TABLET, ORALLY DISINTEGRATING, DELAYED RELEASE ORAL at 08:57

## 2024-03-15 RX ADMIN — ROSUVASTATIN CALCIUM 10 MG: 10 TABLET, FILM COATED ORAL at 22:45

## 2024-03-15 RX ADMIN — DULOXETINE HYDROCHLORIDE 20 MG: 20 CAPSULE, DELAYED RELEASE ORAL at 22:45

## 2024-03-15 RX ADMIN — Medication 100 MG: at 08:54

## 2024-03-15 RX ADMIN — DULOXETINE HYDROCHLORIDE 20 MG: 20 CAPSULE, DELAYED RELEASE ORAL at 08:55

## 2024-03-15 RX ADMIN — Medication 5000 UNITS: at 08:53

## 2024-03-15 RX ADMIN — INSULIN HUMAN 7 UNITS: 100 INJECTION, SUSPENSION SUBCUTANEOUS at 09:52

## 2024-03-15 RX ADMIN — ACETAMINOPHEN 650 MG: 325 TABLET ORAL at 20:03

## 2024-03-15 RX ADMIN — DIBASIC SODIUM PHOSPHATE, MONOBASIC POTASSIUM PHOSPHATE AND MONOBASIC SODIUM PHOSPHATE 2 TABLET: 852; 155; 130 TABLET ORAL at 08:53

## 2024-03-15 RX ADMIN — MONTELUKAST 10 MG: 10 TABLET, FILM COATED ORAL at 22:45

## 2024-03-15 RX ADMIN — ASPIRIN 81 MG CHEWABLE TABLET 81 MG: 81 TABLET CHEWABLE at 08:54

## 2024-03-15 RX ADMIN — Medication 10 ML: at 09:53

## 2024-03-15 RX ADMIN — CARVEDILOL 6.25 MG: 6.25 TABLET, FILM COATED ORAL at 08:54

## 2024-03-15 RX ADMIN — ARFORMOTEROL TARTRATE: 15 SOLUTION RESPIRATORY (INHALATION) at 08:49

## 2024-03-15 RX ADMIN — SODIUM CHLORIDE, SODIUM LACTATE, CALCIUM CHLORIDE, MAGNESIUM CHLORIDE AND DEXTROSE 6000 ML: 2.5; 538; 448; 18.3; 5.08 INJECTION, SOLUTION INTRAPERITONEAL at 16:34

## 2024-03-15 RX ADMIN — INSULIN HUMAN 10 UNITS: 100 INJECTION, SUSPENSION SUBCUTANEOUS at 23:11

## 2024-03-15 RX ADMIN — ERYTHROPOIETIN 20000 UNITS: 20000 INJECTION, SOLUTION INTRAVENOUS; SUBCUTANEOUS at 17:24

## 2024-03-15 RX ADMIN — INSULIN LISPRO 1 UNITS: 100 INJECTION, SOLUTION INTRAVENOUS; SUBCUTANEOUS at 09:51

## 2024-03-15 ASSESSMENT — PAIN SCALES - GENERAL
PAINLEVEL_OUTOF10: 0
PAINLEVEL_OUTOF10: 6
PAINLEVEL_OUTOF10: 3
PAINLEVEL_OUTOF10: 6
PAINLEVEL_OUTOF10: 0

## 2024-03-15 ASSESSMENT — PAIN DESCRIPTION - ORIENTATION
ORIENTATION: LEFT

## 2024-03-15 ASSESSMENT — PAIN DESCRIPTION - LOCATION
LOCATION: BACK

## 2024-03-15 NOTE — PROGRESS NOTES
Physical Therapy    Spoke with RN in prep for therapy session.  Patient to received blood transfusion shortly and then plan is to d/c home.  Will defer, please refer to last therapy note for recommendations.    Natalya Mckeon MS, PT

## 2024-03-15 NOTE — PROGRESS NOTES
Comprehensive Nutrition Assessment    Type and Reason for Visit: Reassess    Nutrition Recommendations/Plan:     Continue regular diet.  Whole milk with all meals.  She will NOT drink supplements, so please do not order.    Recommend stopping scheduled daily KPhos with high Phos levels now.  Defer when to resume Phos binders to Nephrology.         Malnutrition Assessment:  Malnutrition Status:  Moderate malnutrition (02/23/24 1431)    Context:  Chronic Illness     Findings of the 6 clinical characteristics of malnutrition:  Energy Intake:  Unable to assess  Weight Loss:  Greater than 7.5% over 3 months     Body Fat Loss:  Mild body fat loss Triceps   Muscle Mass Loss:  Unable to assess    Fluid Accumulation:  No significant fluid accumulation     Strength:  Not Performed       Nutrition Assessment:    Past Medical History:   Diagnosis Date    Asthma     CAD (coronary artery disease)     COPD (chronic obstructive pulmonary disease) ([facility])     PCP manages    CVA (cerebral vascular accident) ([facility]) 11/01/2023    Patient presented with right-sided weakness.  Per MRI of the brain on 11/1/2023, acute versus subacute embolic infarctions involving the left MCA territory and right PICA territory    Diabetes mellitus, type 2 ([facility])     ESRD on peritoneal dialysis ([facility])     Eduardo (Tammie-PD nurse) [facility] [phone]- was on hemodialysis M-W-F Eduardo - now on PD    GERD (gastroesophageal reflux disease)     History of blood transfusion     Hyperlipidemia     Hypertension        Pt admitted with Hypomagnesemia [E83.42]  Vomiting [R11.10]  Prolonged Q-T interval on ECG [R94.31]  Intractable nausea and vomiting [R11.2]      3/15: DHT removed yesterday afternoon.  Pt has been taking and tolerating >50% of most meals.  Likely d/c home today after blood transfusion.  Hopefully will not see her back within a month d/t same issue (we have seen her almost monthly since August d/t same issue).   Phos trending high.  Perhaps drinking too  9 oz 72.8 kg -   10/19/2023 187 lbs 3 oz 84.9 kg Bed scale   10/18/2023 168 lbs 10 oz 76.5 kg Bed scale   10/17/2023 168 lbs 10 oz 76.5 kg Bed scale   10/14/2023 165 lbs 9 oz 75.1 kg Bed scale   10/12/2023 154 lbs 5 oz 70 kg Estimated   10/9/2023 154 lbs 69.9 kg -   10/5/2023 154 lbs 9 oz 70.1 kg Standing scale   10/4/2023 158 lbs 5 oz 71.8 kg Standing scale   9/30/2023 174 lbs 8 oz 79.2 kg Stated   9/18/2023 177 lbs 80.3 kg -   9/15/2023 174 lbs 78.9 kg -   9/14/2023 172 lbs 13 oz 78.4 kg Standing scale   9/13/2023 174 lbs 13 oz 79.3 kg Standing scale   9/12/2023 183 lbs 14 oz 83.4 kg Standing scale   9/11/2023 177 lbs  177 lbs 13 oz 80.3 kg  80.7 kg Standing scale   9/10/2023 180 lbs 6 oz 81.8 kg Standing scale   9/9/2023 180 lbs 12 oz 82 kg Standing scale   9/8/2023 178 lbs 2 oz 80.8 kg Standing scale   9/7/2023 173 lbs 5 oz  172 lbs 6 oz 78.6 kg  78.2 kg Standing scale   9/6/2023 173 lbs 5 oz 78.6 kg Standing scale   9/5/2023 175 lbs 1 oz 79.4 kg Standing scale   9/4/2023 170 lbs 10 oz 77.4 kg Standing scale   9/3/2023 173 lbs 1 oz 78.5 kg Standing scale   9/2/2023 176 lbs 2 oz 79.9 kg Standing scale   9/1/2023 175 lbs 8 oz 79.6 kg Standing scale   8/31/2023 175 lbs 1 oz  175 lbs 1 oz 79.4 kg  79.4 kg Standing scale   8/30/2023 179 lbs 4 oz 81.3 kg Standing scale       07/24/23 89.2 kg (196 lb 10.4 oz)   07/18/23 85.3 kg (188 lb)   11/28/22 87.5 kg (193 lb)   06/17/22 91.8 kg (202 lb 4.8 oz)   05/20/22 95.7 kg (211 lb)   05/18/22 97.1 kg (214 lb)   05/17/22 98.2 kg (216 lb 9.6 oz)       Nutrition Diagnosis:   Inadequate oral intake related to altered GI function as evidenced by weight loss, poor intake prior to admission, intake 51-75%  Increased nutrient needs related to increase demand for energy/nutrients as evidenced by dialysis    Nutrition Interventions:   Food and/or Nutrient Delivery: Continue Current Diet, Discontinue Tube Feeding  Nutrition Education/Counseling: Counseling initiated  Coordination of

## 2024-03-15 NOTE — PROGRESS NOTES
Hospitalist Progress Note  Donell Lyon MD  Answering service: [phone] OR [phone] from in house phone        Date of Service:  3/15/2024  NAME:  Galilea Aguilar  :  1955  MRN:  [id number]      Admission Summary:     HPI: \"Galilea Aguilar is a 68 y.o. female with a history of CAD, COPD, CVA, diabetes, ESRD on peritoneal dialysis who presented to the ED with chief complaint of vomiting and left side/flank pain. In the ED, labs consistent with known renal dysfunction. QTc was found to be prolonged (554). CT demonstrated a large right retroperitoneal mass though has been present on several CT scans since . Patient treated with IV Valium for nausea and referred to hospitalists for further management.      Patient was seen and examined this morning in the ED, she was lying in bed in no acute distress -though did notice her having \"dry heaves\". Patient was recently admitted from  - 2024 for generalized abdominal pain, diagnosed with severe sepsis secondary to peritonitis and was discharged on vancomycin to be given through her peritoneal dialysis fluid and has also been prescribed Diflucan daily for the next month while on antibiotics.  She reports that since she has been discharged, she has had nausea and vomiting that has been persistent and has had difficulty keeping anything down.  Denies any headaches or dizziness, no chest pain or pressure, no shortness of breath or coughing.  Reports no diarrhea or constipation at home and has no abdominal pain.         Interval history / Subjective:   Follow recurrent nausea and vomiting  Tolerating diet  Dobhoff taken out 3/14  No new issues     Assessment & Plan:     Intractable nausea and vomiting possible due to gastroparesis   Recent Peritonitis, completed abx treatment  Retroperitoneal Mass-possible lipoma  -CT abdomen no acute pathology has had unremarkable   -EGD        Code status: Full Code  Prophylaxis: Eliquis  daughter, Chaz Aguilar at , updated her.     Plan: discharge today to home  Care Plan discussed with: Patient/family, nurse, CM  Anticipated Disposition: discharge home today     Principal Problem:    Vomiting  Active Problems:    Intractable nausea and vomiting    Type 2 diabetes with nephropathy (HCC)    Palliative care encounter    Altered mental status    Advanced care planning/counseling discussion    Hallucinations    Insomnia    Poorly controlled diabetes mellitus (HCC)  Resolved Problems:    * No resolved hospital problems. *        Vital Signs:    Last 24hrs VS reviewed since prior progress note. Most recent are:  BP (!) 82/64   Pulse 82   Temp 97.7 °F (36.5 °C) (Oral)   Resp 20   Ht 1.6 m (5' 3\")   Wt 73.1 kg (161 lb 2.5 oz)   SpO2 100%   BMI 28.55 kg/m²        Intake/Output Summary (Last 24 hours) at 3/15/2024 1151  Last data filed at 3/15/2024 0700  Gross per 24 hour   Intake 1300 ml   Output 462 ml   Net 838 ml          Physical Examination:     I had a face to face encounter with this patient and independently examined them on 3/15/2024 as outlined below:          General : Cheerful lady, sitting in a chair by the bedside, on room air.  HEENT: NGT in place, PEERL, EOMI, moist mucus membrane  Neck: supple, no JVD, no meningeal signs  Chest: Bilateral breath sounds + with diffuse crepitations over bilateral lung fields, no wheezing  CVS: S1 S2 heard, Capillary refill less than 2 seconds  Abd: soft/ non tender, non distended, BS physiological,   Ext: no clubbing, no cyanosis, no edema, brisk 2+ DP pulses  Neuro/Psych: non focal on brief neuro exam   Skin: warm         Data Review:    Review and/or order of clinical lab test  Review and/or order of tests in the radiology section of CPT  Review and/or order of tests in the medicine section of CPT    I have independently reviewed and interpreted patient's lab and all other diagnostic

## 2024-03-15 NOTE — DISCHARGE SUMMARY
cyanosis, no edema, brisk 2+ DP pulses  Neuro/Psych: pleasant mood and affect, CN 2-12 grossly intact, sensory grossly within normal limit, Strength 5/5 in all extremities, DTR 1+ x 4  Skin: warm     CHRONIC MEDICAL DIAGNOSES:      Greater than 38 minutes were spent with the patient on counseling and coordination of care    Signed:   Donell Lyon MD  3/15/2024  11:56 AM

## 2024-03-15 NOTE — PLAN OF CARE
Shift Summary   -Pt had uneventful night, was able to get some good sleep. Assisted to commode x2, one small BM and urine.   -Peritoneal Dialysis infusing overnight   -Ate 50% of dinner, milk, 2 juices  -Alert and oriented x4, pt reports looking forward to discharge.     Problem: Discharge Planning  Goal: Discharge to home or other facility with appropriate resources  Outcome: Progressing  Flowsheets  Taken 3/15/2024 0030  Discharge to home or other facility with appropriate resources: Identify barriers to discharge with patient and caregiver  Taken 3/14/2024 2000  Discharge to home or other facility with appropriate resources: Identify discharge learning needs (meds, wound care, etc)     Problem: Pain  Goal: Verbalizes/displays adequate comfort level or baseline comfort level  Outcome: Progressing  Flowsheets  Taken 3/14/2024 1900 by Carmita Arevalo RN  Verbalizes/displays adequate comfort level or baseline comfort level: Encourage patient to monitor pain and request assistance  Taken 3/14/2024 1215 by Carmita Arevalo RN  Verbalizes/displays adequate comfort level or baseline comfort level: Encourage patient to monitor pain and request assistance     Problem: Safety - Adult  Goal: Free from fall injury  Outcome: Progressing     Problem: Nutrition Deficit:  Goal: Optimize nutritional status  Outcome: Progressing     Problem: Physical Therapy - Adult  Goal: By Discharge: Performs mobility at highest level of function for planned discharge setting.  See evaluation for individualized goals.  Description: FUNCTIONAL STATUS PRIOR TO ADMISSION: Patient was modified independent using a rolling walker for functional mobility, wheelchair for longer distances. Pt reported a fall a few days prior to this admission    HOME SUPPORT PRIOR TO ADMISSION: The patient lived with family and required assist for some ADLs.    Physical Therapy Goals  Revisited 3/11/2024, gains made goals not met but remain appropriate so carry  over.        Physical Therapy Goals  Initiated 2/23/2024, reassessed and remain appropriate 03/04/24   1.  Patient will move from supine to sit and sit to supine and roll side to side in bed with modified independence within 7 day(s).    2.  Patient will perform sit to stand with modified independence within 7 day(s).  3.  Patient will transfer from bed to chair and chair to bed with modified independence using the least restrictive device within 7 day(s).  4.  Patient will ambulate with supervision/set-up for 50 feet with the least restrictive device within 7 day(s).   5.  Patient will ascend/descend 4 stairs with single handrail(s) with supervision/set-up within 7 day(s).    3/14/2024 1534 by Nataliya Salgado, PT  Outcome: Progressing     Problem: Skin/Tissue Integrity  Goal: Absence of new skin breakdown  Description: 1.  Monitor for areas of redness and/or skin breakdown  2.  Assess vascular access sites hourly  3.  Every 4-6 hours minimum:  Change oxygen saturation probe site  4.  Every 4-6 hours:  If on nasal continuous positive airway pressure, respiratory therapy assess nares and determine need for appliance change or resting period.  Outcome: Progressing     Problem: Respiratory - Adult  Goal: Achieves optimal ventilation and oxygenation  Outcome: Progressing

## 2024-03-15 NOTE — FLOWSHEET NOTE
Primary RN SBAR: ALESSIA Vazquez RN  Patient Education: Procedural, Site care  Hospital associated wait time; reason: Pharmacy delivery of dialysis bags  Hepatitis B Surface Ag   Date/Time Value Ref Range Status   02/26/2024 04:19 AM <0.10 Index Final     Hep B S Ab   Date/Time Value Ref Range Status   02/29/2024 07:51 AM <3.10 mIU/mL Final       03/15/24 1558   Vitals   BP (!) 109/57   Temp 98.3 °F (36.8 °C)   Pulse 72   Respirations 22   Observations & Evaluations   Level of Consciousness 0   Oriented X 4   Heart Rhythm Regular   Respiratory Quality/Effort Unlabored   O2 Device None (Room air)   Skin Color   (WDL)   Skin Condition/Temp Dry;Warm   Abdomen Inspection Soft;Obese   Edema None   Peritoneal Dialysis Catheter Mid lower abdomen   No placement date or time found.   Catheter Location: Mid lower abdomen   Status Accessed   Site Condition Clean, dry, intact   Dressing Status New dressing applied;Clean, dry & intact   Dressing Gauze   Date of Last Dressing Change 03/15/24   Dialysis Type Continuous cycling   Exit Site Condition good   Catheter Care Given Yes  (2 min alcavis scrub / soak)   Cycler   Verification of Prescription CCPD   Informed Consent  Yes   Total Volume Programmed 34656 mL   Therapy Time (Hours:Minutes) 9:30   Cycler Type [facility]   Fill Volume 2500 mL   Last Fill Volume 0 mL   Dextrose Setting Same (Nonextraneal)   I Drain Alarm 0 mL   Number of Cycles 5   Bag Volume 6000 mL   Number of Bags Used 3   Dianeal Solution   (Dextrose 2.5% in 6000 mL)        03/15/24 1640   Treatment   Time On 1645   Time Off 0215   Technical Checks   All Connections Secure Yes   ICEBOAT I;C;E;B;O;A;T   Machine Alarm Self Test Completed;Passed       Pt orders,notes, labs and code status reviewed. Time out complete. CCPD initiated as per md order. Remained present during initial drain followed by initiation of first fill.   Upon departure, bed in lowest position, call bell and personal belongings within

## 2024-03-15 NOTE — PROGRESS NOTES
BIANCA GRIFFIN [facility]      Nephrology  Note  Galilea Aguilar  Date of Admission : 2/21/2024    CC:  Follow up for ESRD       Assessment and Plan     ESRD on PD   - cont on CCPD 2.5% - no issues  - Labs every other day    Hypo-K, mag, Phos,ca  - 2/2 poor nutrition    Recent PD peritonitis  - cx positive for staph epi on 1/26  - Completed abx course; PD fluid neg here this admission    Hypoxia   - pulmonary following  - CT: Bilateral lower lobe atelectasis. Minimal pulmonary edema.   - good UF with current PD prescription    Malnutrition:  - s/p TF; encouraged PO intake     Abdominal pain with vomiting  - CT showing gall stones with sludge, right retroperitoneal mass consistent with Lipoma that is unchanged from prior study. Renal cysts    Anemia of CKD  - Cont LIS 20K    Encephalopathy: resolved  Prolonged QT interval   HTN   DM  COPD  CAD         Interval History:  Patient seen and examined. No PD issues. UF 462cc. No more NGT      Current Medications: all current  Medications have been eviewed in EPIC  Review of Systems: A comprehensive review of systems was negative.    Objective:  Vitals:    Vitals:    03/15/24 0600 03/15/24 0706 03/15/24 0851 03/15/24 0854   BP:  (!) 103/59 120/65 120/65   Pulse: 74 77 79 81   Resp:  20 22    Temp:  97.8 °F (36.6 °C) 98 °F (36.7 °C)    TempSrc:  Oral Oral    SpO2:       Weight: 73.1 kg (161 lb 2.5 oz)      Height:         Intake and Output:  No intake/output data recorded.  03/13 1901 - 03/15 0700  In: 1570 [P.O.:1540]  Out: 1274     Physical Examination:  General: Alert and conversant   HEENT No NGT  Resp:  Diminished posteriorly, no wheezing, on RA  CV:  Regular Rate/ Rhythm,  no  LE edema  GI:  Soft, NT, PD exit site dressing D/I.  Neurologic:  Oriented person/ place    LABS:  Recent Labs     03/15/24  0536 03/13/24  0128 03/11/24  0445    140 140   K 4.6 4.1 3.7    104 102   CO2 33* 30 32   BUN 57* 49* 41*   CREATININE 5.11* 4.40* 4.41*   CALCIUM 7.3*  7.2* 7.1*   LABALBU 1.4* 1.4* 1.3*   PHOS 6.9* 3.8 3.3   MG 1.7 1.7 1.7     Recent Labs     03/15/24  0904 03/15/24  0600 03/11/24  0445 03/10/24  0558   WBC  --  6.9 11.2* 12.4*   HGB 7.0* 6.0* 7.3* 7.7*   HCT 24.2* 20.5* 25.3* 25.3*   PLT  --  366 241 217     No results for input(s): \"WINSTON\", \"KU\", \"CLU\", \"CREAU\" in the last 720 hours.    Invalid input(s): \"PROU\"  No results found for: \"SDES\"  No components found for: \"CULT\"      Review of Medical Records: I have reviewed all pertinent labs, chart notes, microbiology data, radiology imaging this patient.  Medications list personally reviewed.  No change in PMH ,family and social history from Consult note.      Ashkan Longo MD  [facility] [address], [place] [address]  Phone: [phone]   [phone]

## 2024-03-15 NOTE — PROGRESS NOTES
1600 Pt transport called to notify that the  time will be 2230. Pt is still receiving blood. Notified the doctor, per MD starting the PD, pt will be staying overnight.    1900 Daughter called the unit, notified her about the delay.      1930 Called the transportation at [phone] per 's note, requested the  in the morning.      The patient is discharging home on 3/16/2024 with stretcher transport at 10:00am. ([phone]) Trip id: [id number].   This nurse spoke with patient's daughter, Chaz and she is aware the patient is coming home tomorrow morning.

## 2024-03-15 NOTE — FLOWSHEET NOTE
03/15/24 0700   Observations & Evaluations   Level of Consciousness 0   Oriented X 4   Heart Rhythm Regular   Respiratory Quality/Effort Unlabored   O2 Device None (Room air)   Skin Color Other (comment)  (Normal for ethnicity)   Skin Condition/Temp Warm;Dry   Abdomen Inspection Soft;Obese   Edema None   Peritoneal Dialysis Catheter Mid lower abdomen   No placement date or time found.   Catheter Location: Mid lower abdomen   Status Deaccessed   Site Condition Clean, dry, intact   Dressing Status Clean;Dry;Intact   Dressing Gauze   Date of Last Dressing Change 03/14/24   Dialysis Type Continuous cycling   Catheter Care Given Yes  (Two minute Alcavis scrub/soak performed prior to disconnection)   Post-Treatment (Cycler)   Average Dwell Time (Hours:Minutes) 1:16   Lost Dwell Time (Hours:Minutes) :31   Effluent Appearance Clear;Yellow   PD Output (mL) 462 mL  (Idrain 60ml + total  ml + net  ml)     Primary RN SBAR: Evelyn Cole RN    Comments: Patient disconnected and sterile mini cap placed.  PD cath taped securely to the patient's abdomen.  Used cassette, lines and bags discarded in red bin.      Hospital associated wait time; reason: N/A  Hepatitis B Surface Ag   Date/Time Value Ref Range Status   02/26/2024 04:19 AM <0.10 Index Final     Hep B S Ab   Date/Time Value Ref Range Status   02/29/2024 07:51 AM <3.10 mIU/mL Final

## 2024-03-15 NOTE — CONSENT
Informed Consent for Blood Component Transfusion Note    I have discussed with the patient and daughter the rationale for blood component transfusion; its benefits in treating or preventing fatigue, organ damage, or death; and its risk which includes mild transfusion reactions, rare risk of blood borne infection, or more serious but rare reactions. I have discussed the alternatives to transfusion, including the risk and consequences of not receiving transfusion. The patient and daughter had an opportunity to ask questions and had agreed to proceed with transfusion of blood components.    Electronically signed by Donell Lyon MD on 3/15/24 at 11:50 AM EDT

## 2024-03-15 NOTE — DIABETES MGMT
[facility] FOR DIABETES HEALTH  DIABETES MANAGEMENT CONSULT    Re-consulted by Provider for advanced nursing evaluation and care for inpatient blood glucose management.    Evaluation and Action Plan   This 68 year old AA female was admitted 2/21/24 with nausea & vomiting. Patient has CKD and is on PD at home; this has continued in-house. Usual home insulin dosing consists of 10 units of total insulin per day. Patient was receiving TF for a few days, along with corrective insulin, with Bgs rising into the 200-300s. TF was stopped 3/4/24 in order to address pulmonary edema. PD useful in pulling extra fluid, but Bps were lowish. Given one-time dose of steroid & a pint of PRBCs. TF had been in place before providing just at night. Now, Tfs have been completely stopped as patient is eating >50% of meals.    Bgs are now in the 190-280 range so adjustments in her insulin dosing are warranted. Patient is eating but not all of her food.    Management Rationale Action Plan   Medication   Basal & nutritional needs Based on  []        Blood glucose pattern  []        Weight & BMI  []        Kidney dysfunction  [x]        Home diabetes regimen  Tube feedings     Increase NPH insulin to 10 units twice daily     Corrective insulin Based on sensitivity  [x]        Low   []        Medium  []        High      Additional orders        Diabetes Discharge Plan   Medication  NPH insulin 10 units twice daily     Referral  []        Outpatient diabetes education   Additional orders            Initial Presentation   Galilea Aguilar is a 68 y.o. female admitted 2/21/24 from ED after experiencing vomiting and left side/flank pain - ESRD-PD dependant. Recent admission 1/25-2/5 for abd pain/ sepsis secondary to peritonitis and was d/c'd with abx. Since discharged persistent N/V.   CT: Large right retroperitoneal mass though has been present on several CT scans since 2021      HX:  Past Medical History:   Diagnosis Date    Asthma     CAD  diabetes-related events  3/8/24 TF will switch to nocturnal feeds   3/11/24 AM NPH reduced for consistent low BG in afternoons-receiving nocturnal enteral feedings.  3/12/24 Evening and overnight hyperglycemia >200, adjusted PM NPH to better cover for nocturnal enteral feed (1:5 insulin to carb ratio)  3/13/24 Eating. Nocturnal enteral feeds stopped  3/15/24 Eating better. Bgs in 200s    Assessment and Nursing Intervention   Nursing Diagnosis Risk for unstable blood glucose pattern   Nursing Intervention Domain 5250 Decision-making Support   Nursing Interventions Examined current inpatient diabetes/blood glucose control   Explored factors facilitating and impeding inpatient management  Explored corrective strategies with patient and responsible inpatient provider   Informed patient of rational for insulin strategy while hospitalized     Billing Code(s)   [] 22171 IP subsequent hospital care - 50 minutes   [] 41113 IP subsequent hospital care - 35 minutes   [x] 50061 IP subsequent hospital care - 25 minutes   [] 23195 IP initial hospital care - 40 minutes     Before making these care recommendations, I personally reviewed the hospitalization record, including notes, laboratory & diagnostic data and current medications, and examined the patient at the bedside (circumstances permitting) before determining care. More than fifty (50) percent of the time was spent in patient counseling and/or care coordination.  Total minutes: 25    DAYA Garcia - CNS  Clinical Nurse Specialist - Diabetes & endocrine disorders  [facility]  Access via [employer]

## 2024-03-15 NOTE — DISCHARGE INSTRUCTIONS
Discharge Instructions       PATIENT ID: Galilea Aguilar  MRN: [id number]   YOB: 1955    DATE OF ADMISSION: [unfilled]    DATE OF DISCHARGE: 3/15/2024    PRIMARY CARE PROVIDER: @PCP@     ATTENDING PHYSICIAN: [unfilled]  DISCHARGING PROVIDER: Donell Lyon MD    To contact this individual call [phone] and ask the  to page.   If unavailable ask to be transferred the Adult Hospitalist Department.    DISCHARGE DIAGNOSES Nausea and vomiting    CONSULTATIONS: GI, Nephrology    PROCEDURES/SURGERIES: * No surgery found *    PENDING TEST RESULTS:   At the time of discharge the following test results are still pending: none    FOLLOW UP APPOINTMENTS:   PCP  Nephrology    ADDITIONAL CARE RECOMMENDATIONS: as above    DIET: renal diet    ACTIVITY: activity as tolerated      DISCHARGE MEDICATIONS:   See Medication Reconciliation Form    It is important that you take the medication exactly as they are prescribed.   Keep your medication in the bottles provided by the pharmacist and keep a list of the medication names, dosages, and times to be taken in your wallet.   Do not take other medications without consulting your doctor.       NOTIFY YOUR PHYSICIAN FOR ANY OF THE FOLLOWING:   Fever over 101 degrees for 24 hours.   Chest pain, shortness of breath, fever, chills, nausea, vomiting, diarrhea, change in mentation, falling, weakness, bleeding. Severe pain or pain not relieved by medications.  Or, any other signs or symptoms that you may have questions about.      DISPOSITION:   x Home With:   OT  PT  HH  RN       SNF/Inpatient Rehab/LTAC    Independent/assisted living    Hospice    Other:     CDMP Checked:   Yes x     PROBLEM LIST Updated:  Yes x       Signed:   Donell Lyon MD  3/15/2024  11:55 AM

## 2024-03-15 NOTE — CARE COORDINATION
Transition Of Care:     The patient is discharging home today with stretcher transport at 6:15pm. ([phone]) Trip id: [id number].  The patient will resume home PD dialysis and resume home health with [facility]. Family to assist with home needs and patient has home dme. CM faxed DC summary to [facility]. CM spoke with patient's daughter, Chaz and she is aware the patient is coming home today.      Noted: Diet advancement pending. Nephrology, GI, ID, Pulm, PT/OT following. Therapy recommending SNF. The patient is a Peritoneal Dialysis patient from home and the only SNF's doing PD dialysis in the area are [facility] and [place]. CM called all district liaisons to inquire on SNF's accepting PD patients and these were the only two. CM called patient's daughter: Chaz and she declines the patient to go to these SNF's and will plan to take the patient home and resume [facility] health with [facility]. [facility] has accepted. Transport TBD      [facility] PD clinic: [facility].   Address: [address] [place] [address], [address]  Phone: [phone], Fax: [phone]     RUR: 36% High  Prior Level of Functioning: Modified independent, uses a walker, cane and wheelchair, Family assists with bathing and dressing and [facility]  Disposition: Home with [facility] PT/OT  If SNF or IPR: Date FOC offered: 3/1/24  Date FOC received: Choice pending  Accepting facility: None. Patient's daughter declines SNF.   Date authorization started with reference number:   Date authorization received and expires:   Follow up appointments: NA  DME needed: The patient has dme: Walker, rollator, cane, wheelchair, shower chair.   Transportation at discharge: TBD  IM/IMM Medicare/ letter given: 2/23/24  Caregiver Contact: Son Mak Aguilar [phone]/Maximiliano Aguilar [phone]   Discharge Caregiver contacted prior to discharge? NA  Care Conference needed? No  Barriers to discharge: None     Gia  Lashawn MARRERO/RIA  [phone]

## 2024-03-16 VITALS
TEMPERATURE: 98 F | OXYGEN SATURATION: 100 % | HEART RATE: 72 BPM | BODY MASS INDEX: 28.87 KG/M2 | HEIGHT: 63 IN | RESPIRATION RATE: 18 BRPM | WEIGHT: 162.92 LBS | SYSTOLIC BLOOD PRESSURE: 95 MMHG | DIASTOLIC BLOOD PRESSURE: 54 MMHG

## 2024-03-16 LAB
ABO + RH BLD: NORMAL
BLD PROD TYP BPU: NORMAL
BLOOD BANK BLOOD PRODUCT EXPIRATION DATE: NORMAL
BLOOD BANK DISPENSE STATUS: NORMAL
BLOOD BANK ISBT PRODUCT BLOOD TYPE: 7300
BLOOD BANK PRODUCT CODE: NORMAL
BLOOD BANK UNIT TYPE AND RH: NORMAL
BLOOD GROUP ANTIBODIES SERPL: NORMAL
BPU ID: NORMAL
CROSSMATCH RESULT: NORMAL
GLUCOSE BLD STRIP.AUTO-MCNC: 208 MG/DL (ref 65–117)
GLUCOSE BLD STRIP.AUTO-MCNC: 251 MG/DL (ref 65–117)
HCT VFR BLD AUTO: 24.9 % (ref 35–47)
HGB BLD-MCNC: 7.4 G/DL (ref 11.5–16)
SERVICE CMNT-IMP: ABNORMAL
SERVICE CMNT-IMP: ABNORMAL
SPECIMEN EXP DATE BLD: NORMAL
UNIT DIVISION: 0
UNIT ISSUE DATE/TIME: NORMAL

## 2024-03-16 PROCEDURE — 6370000000 HC RX 637 (ALT 250 FOR IP): Performed by: CLINICAL NURSE SPECIALIST

## 2024-03-16 PROCEDURE — 6370000000 HC RX 637 (ALT 250 FOR IP): Performed by: HOSPITALIST

## 2024-03-16 PROCEDURE — 36415 COLL VENOUS BLD VENIPUNCTURE: CPT

## 2024-03-16 PROCEDURE — 6370000000 HC RX 637 (ALT 250 FOR IP): Performed by: NURSE PRACTITIONER

## 2024-03-16 PROCEDURE — 2580000003 HC RX 258: Performed by: NURSE PRACTITIONER

## 2024-03-16 PROCEDURE — 82962 GLUCOSE BLOOD TEST: CPT

## 2024-03-16 PROCEDURE — 85014 HEMATOCRIT: CPT

## 2024-03-16 PROCEDURE — 6370000000 HC RX 637 (ALT 250 FOR IP): Performed by: INTERNAL MEDICINE

## 2024-03-16 PROCEDURE — 6370000000 HC RX 637 (ALT 250 FOR IP): Performed by: STUDENT IN AN ORGANIZED HEALTH CARE EDUCATION/TRAINING PROGRAM

## 2024-03-16 PROCEDURE — 6360000002 HC RX W HCPCS: Performed by: NURSE PRACTITIONER

## 2024-03-16 PROCEDURE — 94640 AIRWAY INHALATION TREATMENT: CPT

## 2024-03-16 PROCEDURE — 94760 N-INVAS EAR/PLS OXIMETRY 1: CPT

## 2024-03-16 PROCEDURE — 85018 HEMOGLOBIN: CPT

## 2024-03-16 RX ADMIN — INSULIN HUMAN 10 UNITS: 100 INJECTION, SUSPENSION SUBCUTANEOUS at 09:44

## 2024-03-16 RX ADMIN — APIXABAN 2.5 MG: 2.5 TABLET, FILM COATED ORAL at 09:43

## 2024-03-16 RX ADMIN — DULOXETINE HYDROCHLORIDE 20 MG: 20 CAPSULE, DELAYED RELEASE ORAL at 09:45

## 2024-03-16 RX ADMIN — Medication 5000 UNITS: at 09:44

## 2024-03-16 RX ADMIN — Medication 100 MG: at 09:43

## 2024-03-16 RX ADMIN — CARVEDILOL 6.25 MG: 6.25 TABLET, FILM COATED ORAL at 09:45

## 2024-03-16 RX ADMIN — LANSOPRAZOLE 15 MG: 30 TABLET, ORALLY DISINTEGRATING, DELAYED RELEASE ORAL at 09:53

## 2024-03-16 RX ADMIN — ACETAMINOPHEN 650 MG: 325 TABLET ORAL at 13:07

## 2024-03-16 RX ADMIN — DIBASIC SODIUM PHOSPHATE, MONOBASIC POTASSIUM PHOSPHATE AND MONOBASIC SODIUM PHOSPHATE 2 TABLET: 852; 155; 130 TABLET ORAL at 09:43

## 2024-03-16 RX ADMIN — ARFORMOTEROL TARTRATE: 15 SOLUTION RESPIRATORY (INHALATION) at 07:49

## 2024-03-16 RX ADMIN — Medication 10 ML: at 09:47

## 2024-03-16 RX ADMIN — INSULIN LISPRO 1 UNITS: 100 INJECTION, SOLUTION INTRAVENOUS; SUBCUTANEOUS at 10:09

## 2024-03-16 RX ADMIN — ASPIRIN 81 MG CHEWABLE TABLET 81 MG: 81 TABLET CHEWABLE at 09:43

## 2024-03-16 RX ADMIN — INSULIN LISPRO 2 UNITS: 100 INJECTION, SOLUTION INTRAVENOUS; SUBCUTANEOUS at 13:07

## 2024-03-16 ASSESSMENT — PAIN - FUNCTIONAL ASSESSMENT: PAIN_FUNCTIONAL_ASSESSMENT: ACTIVITIES ARE NOT PREVENTED

## 2024-03-16 ASSESSMENT — PAIN SCALES - GENERAL: PAINLEVEL_OUTOF10: 8

## 2024-03-16 NOTE — PLAN OF CARE
Problem: Pain  Goal: Verbalizes/displays adequate comfort level or baseline comfort level  Outcome: Progressing     Problem: Chronic Conditions and Co-morbidities  Goal: Patient's chronic conditions and co-morbidity symptoms are monitored and maintained or improved  Outcome: Progressing  Flowsheets (Taken 3/15/2024 1950)  Care Plan - Patient's Chronic Conditions and Co-Morbidity Symptoms are Monitored and Maintained or Improved: Collaborate with multidisciplinary team to address chronic and comorbid conditions and prevent exacerbation or deterioration     Problem: Nutrition Deficit:  Goal: Optimize nutritional status  Outcome: Progressing

## 2024-03-16 NOTE — CARE COORDINATION
Transition of Care Plan  RUR 35%    Patient did not discharge yesterday after hemodialysis.. It seems that transporter kept moving the transport time later and later.  Physician was informed by patient's nurse and patient remained in the hospital over night.  Transporter was to transport this am but changed the time again to 6 pm    Perez arranged [facility]  [phone] for 4 pm today-- the cost is $282.   Ambulance form on chart..     PEREZ talked with patient's daughter, Chaz Aguilar [phone] to inform of the transport time.  She is in agreement.   She will be home waiting for patient.     GRACIA BONILLA, LUCINDAW

## 2024-03-16 NOTE — DISCHARGE SUMMARY
physiological,   Ext: no clubbing, no cyanosis, no edema, brisk 2+ DP pulses  Neuro/Psych: pleasant mood and affect, CN 2-12 grossly intact, sensory grossly within normal limit, Strength 5/5 in all extremities  Skin: warm     CHRONIC MEDICAL DIAGNOSES:  Principal Problem (Resolved):    Vomiting  Active Problems:    * No active hospital problems. *  Resolved Problems:    Intractable nausea and vomiting    Type 2 diabetes with nephropathy (HCC)    Palliative care encounter    Altered mental status    Advanced care planning/counseling discussion    Hallucinations    Insomnia    Poorly controlled diabetes mellitus (HCC)        Greater than 31 minutes were spent with the patient on counseling and coordination of care    Signed:   Silas Baron MD  3/16/2024  1:57 PM

## 2024-03-16 NOTE — FLOWSHEET NOTE
03/16/24 0330   Observations & Evaluations   Level of Consciousness 0   Oriented X 4   Heart Rhythm Regular   Respiratory Quality/Effort Unlabored   O2 Device None (Room air)   Skin Color Other (comment)  (Normal for ethnicity)   Skin Condition/Temp Warm;Dry   Abdomen Inspection Soft;Obese   Edema None   Peritoneal Dialysis Catheter Mid lower abdomen   No placement date or time found.   Catheter Location: Mid lower abdomen   Status Deaccessed   Site Condition Clean, dry, intact   Dressing Status Clean, dry & intact   Dressing Gauze   Date of Last Dressing Change 03/15/24   Dialysis Type Continuous cycling   Catheter Care Given Yes  (Two minute Alcavis scrub/soak performed prior to disconnection)   Post-Treatment (Cycler)   Average Dwell Time (Hours:Minutes) 1:19   Lost Dwell Time (Hours:Minutes) :16   Effluent Appearance Clear;Yellow   PD Output (mL) 386 mL  (Idrain 355 ml + total UF 31 ml + net  ml)     Primary RN SBAR: Kelsey Rivas RN  Comments: After catheter disinfection, patient disconnected and sterile mini cap placed. PD catheter taped securely to the patient's abdomen.

## 2024-03-19 ENCOUNTER — HOME CARE VISIT (OUTPATIENT)
Facility: HOME HEALTH | Age: 69
End: 2024-03-19
Payer: MEDICARE

## 2024-03-19 PROCEDURE — G0299 HHS/HOSPICE OF RN EA 15 MIN: HCPCS

## 2024-03-20 VITALS
SYSTOLIC BLOOD PRESSURE: 138 MMHG | OXYGEN SATURATION: 95 % | BODY MASS INDEX: 28.88 KG/M2 | RESPIRATION RATE: 16 BRPM | TEMPERATURE: 98.9 F | WEIGHT: 163 LBS | DIASTOLIC BLOOD PRESSURE: 72 MMHG | HEART RATE: 90 BPM | HEIGHT: 63 IN

## 2024-03-20 ASSESSMENT — ENCOUNTER SYMPTOMS
HEMOPTYSIS: 0
PAIN LOCATION - PAIN QUALITY: ACHY, TENDER

## 2024-03-20 NOTE — HOME HEALTH
Skilled reason for referral, [facility] SUMMARY of clinical condition:  68 year old female patient of Dr. Chapincito Oakes after hospitalization at [facility] from 2/21-3/16/24 for acute METABOLIC ENCEPHALOPATHY, poorly controlled Type 2 diabetes with nephropathy, Intractable nausea and vomiting possible due to gastroparesis, Recent Peritonitis, and already completed antibiotic treatment, Retroperitoneal Mass-possible lipoma, Respiratory distress, acute pulmonary edema and Anemia.     PMH of arteriosclerotic heart disease, Hypertension, benign, arteriosclerosis obliterans, NSTEMI, Diastolic Heart Failure, Cerebrovascular accident (CVA) due to embolism of precerebral artery, Ejection fraction < 50%, Primary hypertension, Orthostatic hypotension, Peripheral vascular disease, Coronary arteriosclerosis, Atherosclerosis of arteries of extremities, Myocardial infarction, Type 2 Diabetes Mellitus, End stage renal disease on dialysis , Chronic obstructive pulmonary disease, Emphysema, Asthma, Acute renal failure, Anemia in chronic kidney disease, Hypercholesteremia, Sepsis with acute organ dysfunction without septic shock, CABG x 3 , Hyperlipidemia, Dyspepsia, Cigarette smoker     Lungs clear, No edema noted to BLE. Skin intact but dry. Left great toenail came off during hospitalization. Area has no drainage and shows no signs of infection and is open to air. Patient is sometimes forgetful. Admits to not taking medications and checking blood glucose levels. Needs assistance with all ADL's. Family administers peritoneal dialysis nightly over 9.5 hours. Lives in one-level home with children and grandchildren. Has had multiple falls in the past month and is extremely weak, unsteady on feet and is a high fall risk and rehospitalization risk. Ambulates with walker and also has a wheelchair. Caregivers need training to assist patient. Patient requires skilled nursing for cardiopulmonary assessment, genitourinary assessment, and medication

## 2024-03-21 ENCOUNTER — HOME CARE VISIT (OUTPATIENT)
Facility: HOME HEALTH | Age: 69
End: 2024-03-21
Payer: MEDICARE

## 2024-03-21 ENCOUNTER — HOME CARE VISIT (OUTPATIENT)
Dept: HOME HEALTH SERVICES | Facility: HOME HEALTH | Age: 69
End: 2024-03-21
Payer: MEDICARE

## 2024-03-21 VITALS
SYSTOLIC BLOOD PRESSURE: 122 MMHG | TEMPERATURE: 97 F | HEART RATE: 88 BPM | OXYGEN SATURATION: 96 % | DIASTOLIC BLOOD PRESSURE: 58 MMHG | RESPIRATION RATE: 15 BRPM

## 2024-03-21 PROCEDURE — G0299 HHS/HOSPICE OF RN EA 15 MIN: HCPCS

## 2024-03-21 PROCEDURE — G0151 HHCP-SERV OF PT,EA 15 MIN: HCPCS

## 2024-03-21 ASSESSMENT — ENCOUNTER SYMPTOMS: PAIN LOCATION - PAIN QUALITY: ACHY

## 2024-03-23 ENCOUNTER — HOME CARE VISIT (OUTPATIENT)
Facility: HOME HEALTH | Age: 69
End: 2024-03-23
Payer: MEDICARE

## 2024-03-23 VITALS
SYSTOLIC BLOOD PRESSURE: 122 MMHG | OXYGEN SATURATION: 96 % | HEART RATE: 88 BPM | TEMPERATURE: 97 F | DIASTOLIC BLOOD PRESSURE: 58 MMHG

## 2024-03-23 VITALS
DIASTOLIC BLOOD PRESSURE: 70 MMHG | HEART RATE: 86 BPM | OXYGEN SATURATION: 96 % | RESPIRATION RATE: 18 BRPM | SYSTOLIC BLOOD PRESSURE: 130 MMHG | TEMPERATURE: 98 F

## 2024-03-23 PROCEDURE — G0299 HHS/HOSPICE OF RN EA 15 MIN: HCPCS

## 2024-03-23 ASSESSMENT — ENCOUNTER SYMPTOMS: PAIN LOCATION - PAIN QUALITY: UNABLE TO DESCRIBE

## 2024-03-23 NOTE — HOME HEALTH
Skilled reason for admission/summary of clinical condition: Patient is a 68-Year-old female with a H/O recent hospitalization (ESRD on PD N8.4, HTN, DM) admitted for skilled PT/SN/OT at home. Patient now presents with dec ambulation/unsteady gait, dec BLE strength, dec transfer, dec standing balance and unable to get in/out of the house safely. Patient lives in a single-story home with assistance from the family  and was Mod independent in her ambulation using a FWW inside the house. Patient manage 4 steps to get in/out of the house and currently need significant assistance from the caregiver. Patient will benefit from skilled PT at this time to address her functional deficits so that the patient can transfer/ambulate inside the house without assistance from the caregiver with less risk of falling.  Diagnosis: ESRD on PD N8.4, HTN, DM  Caregiver: Daughter/son  will assist with: Transportation. Caregiver is available Inconsistently, Caregiver (daughter) was present during the visit.  Medications reconciled and all medications are available in the home this visit. The following education was provided regarding medications: medication interactions and look alike medications: Patient/CG able to demonstrate knowledge through teach back with 100 percent accuracy.  Medications are effective at this time.  MD notified of any discrepancies/medication interactions -None/medications reviewed with no issues noted. A list of reconciled medications has been given to the patient/caregiver and a copy has been uploaded to media.    Home health supplies by type and quantity ordered/delivered this visit include None  Patient/caregiver instructed on plan of care and are agreeable to plan of care at this time.    Clinician reviewed orientation to home health booklet with patient/caregiver including agency phone number, agency complaint process, state hotline number, as well as joint commission's quality hotline number. Consent forms

## 2024-03-23 NOTE — HOME HEALTH
Subjective: Pt states she does not have N/V anymore. Appetite is still not good.  Falls since last visit No(if yes complete the Fall Tracking Form and include bsrifallreport):   Caregiver involvement changes: na  Home health supplies by type and quantity ordered/delivered this visit include: na    Clinician asked if patient has had any physician contact since last home care visit and patient states: NO  Clinician asked if patient has any new or changed medications and patient states:  N/A   If Yes, were medications reconciled? N/A   Was the certifying physician notified of changes in medications? N/A     Clinical assessment (what this visit means for the patient overall and need for ongoing skilled care) and progress or lack of progress towards SPECIFIC goals: Continued SN needed for education, assessment, no goals met this visit. Pt educated on deep breathing, ambulating with assistance, eating a well balanced diet and monitoring blood glucose as prescribed.     Written Teaching Material Utilized: N/A    Interdisciplinary communication with: N/A     Discharge planning as follows: When goals are met    Specific plan for next visit: Educate on copd disease process

## 2024-03-23 NOTE — HOME HEALTH
Subjective: \"I'm having some pain.\"  Falls since last visit No(if yes complete the Fall Tracking Form and include bsrifallreport):   Caregiver involvement changes:   Home health supplies by type and quantity ordered/delivered this visit include: Supplies in the home    Clinician asked if patient has had any physician contact since last home care visit and patient states: NO  Clinician asked if patient has any new or changed medications and patient states:  NO   If Yes, were medications reconciled? N/A   Was the certifying physician notified of changes in medications? N/A     Clinical assessment (what this visit means for the patient overall and need for ongoing skilled care) and progress or lack of progress towards SPECIFIC goals: Pt has a peritoneal dialysis catheter with CDI dressing and no s/s of infection. Pt would benefit from further HH visits for education and assessment to prevent rehospitalization and infection. SN performed assessment, education and VS.   Next scheduled visit is Tuesday 3/26    Written Teaching Material Utilized: N/A    Interdisciplinary communication with: N/A     Discharge planning as follows: When goals are met    Specific plan for next visit: Educate on disease process

## 2024-03-25 ENCOUNTER — HOME CARE VISIT (OUTPATIENT)
Dept: HOME HEALTH SERVICES | Facility: HOME HEALTH | Age: 69
End: 2024-03-25
Payer: MEDICARE

## 2024-03-26 ENCOUNTER — HOME CARE VISIT (OUTPATIENT)
Facility: HOME HEALTH | Age: 69
End: 2024-03-26
Payer: MEDICARE

## 2024-03-26 NOTE — HOME HEALTH
PT visit canceled secondary to patient/caregiver was not avilable when contacted prior to the visit-Patient need a call prior to the visit secondary to make sure that someone will be avilable to open the door

## 2024-03-27 ENCOUNTER — HOME CARE VISIT (OUTPATIENT)
Facility: HOME HEALTH | Age: 69
End: 2024-03-27
Payer: MEDICARE

## 2024-03-27 VITALS
SYSTOLIC BLOOD PRESSURE: 130 MMHG | OXYGEN SATURATION: 97 % | HEART RATE: 81 BPM | TEMPERATURE: 97.7 F | DIASTOLIC BLOOD PRESSURE: 78 MMHG | RESPIRATION RATE: 17 BRPM

## 2024-03-27 PROCEDURE — G0299 HHS/HOSPICE OF RN EA 15 MIN: HCPCS

## 2024-03-27 ASSESSMENT — ENCOUNTER SYMPTOMS: PAIN LOCATION - PAIN QUALITY: THROBBING

## 2024-03-28 ENCOUNTER — HOME CARE VISIT (OUTPATIENT)
Dept: HOME HEALTH SERVICES | Facility: HOME HEALTH | Age: 69
End: 2024-03-28
Payer: MEDICARE

## 2024-03-28 NOTE — HOME HEALTH
Subjective: Pt states she has back/side pain that normally comes in the evening but it occured this morning.  Falls since last visit No(if yes complete the Fall Tracking Form and include bsrifallreport):   Caregiver involvement changes: na  Home health supplies by type and quantity ordered/delivered this visit include: na    Clinician asked if patient has had any physician contact since last home care visit and patient states: NO  Clinician asked if patient has any new or changed medications and patient states:  N/A   If Yes, were medications reconciled? N/A   Was the certifying physician notified of changes in medications? N/A     Clinical assessment (what this visit means for the patient overall and need for ongoing skilled care) and progress or lack of progress towards SPECIFIC goals: No POC education performed this visit, pt concerned about pain and red dots on side near pain location. Pt states she did not hit her back or side on anything and do not remember when the pain started. CG not available during visit, Sn notified MD office but no answer, voice message left. SN instructed pt to continue to monitor if area grows or other symptoms occur like fever, confusion, loc, dizziness seek medical attention.    Written Teaching Material Utilized: N/A    Interdisciplinary communication with:  Physician for the purpose of pain    Discharge planning as follows: When goals are met    Specific plan for next visit: pain level, spots on side

## 2024-03-29 ENCOUNTER — HOME CARE VISIT (OUTPATIENT)
Facility: HOME HEALTH | Age: 69
End: 2024-03-29
Payer: MEDICARE

## 2024-03-29 ENCOUNTER — HOME CARE VISIT (OUTPATIENT)
Dept: HOME HEALTH SERVICES | Facility: HOME HEALTH | Age: 69
End: 2024-03-29
Payer: MEDICARE

## 2024-04-01 ENCOUNTER — HOME CARE VISIT (OUTPATIENT)
Facility: HOME HEALTH | Age: 69
End: 2024-04-01
Payer: MEDICARE

## 2024-04-01 ENCOUNTER — HOME CARE VISIT (OUTPATIENT)
Dept: HOME HEALTH SERVICES | Facility: HOME HEALTH | Age: 69
End: 2024-04-01
Payer: MEDICARE

## 2024-04-02 ENCOUNTER — HOME CARE VISIT (OUTPATIENT)
Facility: HOME HEALTH | Age: 69
End: 2024-04-02
Payer: MEDICARE

## 2024-04-03 ENCOUNTER — HOME CARE VISIT (OUTPATIENT)
Facility: HOME HEALTH | Age: 69
End: 2024-04-03
Payer: MEDICARE

## 2024-04-04 NOTE — HOME HEALTH
PT made several attempts to contact the patient to schedule the visit with no response-patient need a call prior to the visit and visit missed secondary to this-PT will contact the patient again next week to continue the care-

## 2024-04-08 ENCOUNTER — HOME CARE VISIT (OUTPATIENT)
Facility: HOME HEALTH | Age: 69
End: 2024-04-08
Payer: MEDICARE

## 2024-04-08 VITALS
HEART RATE: 98 BPM | RESPIRATION RATE: 15 BRPM | SYSTOLIC BLOOD PRESSURE: 140 MMHG | OXYGEN SATURATION: 99 % | TEMPERATURE: 98 F | DIASTOLIC BLOOD PRESSURE: 78 MMHG

## 2024-04-08 PROCEDURE — G0151 HHCP-SERV OF PT,EA 15 MIN: HCPCS

## 2024-04-08 ASSESSMENT — ENCOUNTER SYMPTOMS: PAIN LOCATION - PAIN QUALITY: ACHY

## 2024-04-09 ENCOUNTER — HOME CARE VISIT (OUTPATIENT)
Facility: HOME HEALTH | Age: 69
End: 2024-04-09
Payer: MEDICARE

## 2024-04-09 VITALS
TEMPERATURE: 98 F | RESPIRATION RATE: 18 BRPM | OXYGEN SATURATION: 98 % | HEART RATE: 88 BPM | SYSTOLIC BLOOD PRESSURE: 130 MMHG | DIASTOLIC BLOOD PRESSURE: 72 MMHG

## 2024-04-09 PROCEDURE — G0299 HHS/HOSPICE OF RN EA 15 MIN: HCPCS

## 2024-04-09 ASSESSMENT — ENCOUNTER SYMPTOMS: PAIN LOCATION - PAIN QUALITY: ACHE

## 2024-04-09 NOTE — HOME HEALTH
Subjective: I am feeling good and can walk.   I Falls since last visit (if yes include. bsrifallreport): None reported by the patient/caregiver.  Caregiver involvement: Yes- patients grandaughter will be available as needed, was present today.  Home health supplies by type and quantity ordered/delivered this visit include none today.  Does the patient have any new or changed medications? No   If yes, were medications reconciled? no.  Was the certifying physician notified of changes in medications? NA.  Clinical assessment (what this visit means for the patient overall and need for ongoing skilled care): PT interventions including BLE resisted exercises, Gait training and safety education-Patient was needed constant cues while doing the resisted exercises including pacing the reps/what to do next. Patient will continue to need more PT to improve her functions so that the patient can transfer with less possible assistance from the caregiver.  Progress or lack of progress toward specific goals: Patient demonstrated no progress today and need more sessions at this time to meet the goals.  Inter disciplinary communication: none.  Discharge planning: DC when met all the goals/DC when home PT is no longer appropriate/ DC when patient is no longer home bound.   Specific plan for next visit: BLE resisted exercises, gait training and transfer training    - so that the patient can ambulate/ transfer with less possible assistance from the caregiver.

## 2024-04-10 ENCOUNTER — HOME CARE VISIT (OUTPATIENT)
Facility: HOME HEALTH | Age: 69
End: 2024-04-10
Payer: MEDICARE

## 2024-04-10 VITALS
HEART RATE: 98 BPM | TEMPERATURE: 98 F | OXYGEN SATURATION: 99 % | DIASTOLIC BLOOD PRESSURE: 78 MMHG | RESPIRATION RATE: 15 BRPM | SYSTOLIC BLOOD PRESSURE: 130 MMHG

## 2024-04-10 PROCEDURE — G0151 HHCP-SERV OF PT,EA 15 MIN: HCPCS

## 2024-04-10 ASSESSMENT — ENCOUNTER SYMPTOMS: PAIN LOCATION - PAIN QUALITY: ACHY

## 2024-04-10 NOTE — HOME HEALTH
Subjective: I can walk longer.   I Falls since last visit (if yes include. bsrifallreport): None reported by the patient/caregiver.  Caregiver involvement: Yes- patients winterter will be available as needed, was present today.  Home health supplies by type and quantity ordered/delivered this visit include none today.  Does the patient have any new or changed medications? No   If yes, were medications reconciled? no.  Was the certifying physician notified of changes in medications? NA.  Clinical assessment (what this visit means for the patient overall and need for ongoing skilled care): PT interventions including BLE resisted exercises, balance challenging exercises in standing, Gait training and safety education-Patient was needed constant cues while doing the resisted exercises like previous visit  including pacing the reps/what to do next. Patient will continue to need more PT to improve her functions so that the patient can transfer with less possible assistance from the caregiver.  Progress or lack of progress toward specific goals: Patient demonstrated good progress today by ambulating longer distance, but was needed CGAx1 for safety and need more sessions at this time to meet the goals.  Inter disciplinary communication: none.  Discharge planning: DC when met all the goals/DC when home PT is no longer appropriate/ DC when patient is no longer home bound.   Specific plan for next visit: Continue with  BLE resisted exercises, gait training and transfer training    - so that the patient can ambulate/ transfer with less possible assistance from the caregiver.

## 2024-04-10 NOTE — HOME HEALTH
Subjective: Pt states she has been in pain prior to being discharged from the hospital. Pt states MD prescribed flexeril and it only helps a little.  Falls since last visit No(if yes complete the Fall Tracking Form and include bsrifallreport):   Caregiver involvement changes: na  Home health supplies by type and quantity ordered/delivered this visit include: na    Clinician asked if patient has had any physician contact since last home care visit and patient states: YES  Clinician asked if patient has any new or changed medications and patient states:  YES  If Yes, were medications reconciled? NO , CG not available to update medication list.  Was the certifying physician notified of changes in medications? YES     Clinical assessment (what this visit means for the patient overall and need for ongoing skilled care) and progress or lack of progress towards SPECIFIC goals: Pt was in pain during SN visit, unable to educate on  POC interventions. Pt currently using a auto heating pad to help relieve pain as well as deep breathing exercises.     Written Teaching Material Utilized: N/A    Interdisciplinary communication with: SN attempted to notify MD of pain level, office closed and no on call provider available.    Discharge planning as follows: When goals are met    Specific plan for next visit: educate on DM foot care

## 2024-04-15 ENCOUNTER — HOME CARE VISIT (OUTPATIENT)
Facility: HOME HEALTH | Age: 69
End: 2024-04-15
Payer: MEDICARE

## 2024-04-15 NOTE — CASE COMMUNICATION
PT contacted the patients daughter prior to visit and confirmed the appointment. After arrival PT received no answer at the door and PT contacted the daughter and per her patient wants no visit today secondary to not feeling well. Per daughter she forgot about and they already contacted the MD office about this-PT visit canceled secondary to this

## 2024-04-16 ENCOUNTER — HOME CARE VISIT (OUTPATIENT)
Dept: HOME HEALTH SERVICES | Facility: HOME HEALTH | Age: 69
End: 2024-04-16

## 2024-04-16 PROBLEM — R41.82 AMS (ALTERED MENTAL STATUS): Status: ACTIVE | Noted: 2024-04-16

## 2024-04-16 NOTE — H&P
data    Abnormal Labs Reviewed   CBC WITH AUTO DIFFERENTIAL - Abnormal; Notable for the following components:       Result Value    RBC 3.76 (*)     Hemoglobin 11.1 (*)     RDW 17.2 (*)     Platelets 133 (*)     All other components within normal limits   COMPREHENSIVE METABOLIC PANEL - Abnormal; Notable for the following components:    Sodium 132 (*)     Potassium 5.9 (*)     Glucose 135 (*)     Creatinine 5.60 (*)     Bun/Cre Ratio 3 (*)     Est, Glom Filt Rate 8 (*)     Alk Phosphatase 184 (*)     Albumin 1.6 (*)     Globulin 5.8 (*)     Albumin/Globulin Ratio 0.3 (*)     All other components within normal limits   URINALYSIS WITH MICROSCOPIC - Abnormal; Notable for the following components:    pH, Urine 8.5 (*)     Protein,  (*)     Glucose,  (*)     Blood, Urine SMALL (*)     All other components within normal limits   MAGNESIUM - Abnormal; Notable for the following components:    Magnesium 2.7 (*)     All other components within normal limits   POC LACTIC ACID - Abnormal; Notable for the following components:    POC Lactic Acid 4.68 (*)     All other components within normal limits   BASIC METABOLIC PANEL - Abnormal; Notable for the following components:    Sodium 134 (*)     Potassium 5.2 (*)     Glucose 142 (*)     Creatinine 5.54 (*)     Bun/Cre Ratio 3 (*)     Est, Glom Filt Rate 8 (*)     All other components within normal limits   POC LACTIC ACID - Abnormal; Notable for the following components:    POC Lactic Acid 3.57 (*)     All other components within normal limits   POCT GLUCOSE - Abnormal; Notable for the following components:    POC Glucose 124 (*)     All other components within normal limits     IMAGING:   XR CHEST PORTABLE   Final Result      No acute process on portable chest.         CT HEAD WO CONTRAST   Final Result   No acute intracranial process. No significant interval change.               Notes reviewed from all clinical/nonclinical/nursing services involved in patient's  clinical care. Care coordination discussions were held with appropriate clinical/nonclinical/ nursing providers based on care coordination needs.     Assessment/Plan:     Altered Mental Status  - chest Xray with no acute process  - CT head no acute intracranial process  - UA with no evidence of infection  - resolved, pt appears back to baseline - no neuro deficits and alert and oriented  - no leukocytosis     Lactic Acidosis  - no s/s infection  - on admit, pt was hypotensive and no temp was able to be obtained  - last POC was 3.7  - will continue with fluids, encourage po intakes and order serum lab  - continue to monitor lactic until <2    Hx CAD  Hx Htn  Hx CVA  - resume home meds once reconciled, pt unable to recall current medications    ESRD on PD  Mild Hyperkalemia  - consult to nephrology for dialysis  - no abdominal tenderness    DIET: ADULT DIET; Regular   ISOLATION PRECAUTIONS: No active isolations  CODE STATUS: Full Code   Central Line:   none  DVT PROPHYLAXIS: Heparin  FUNCTIONAL STATUS PRIOR TO HOSPITALIZATION: Ambulatory and capable of self-care but relies on assistive devices (rolling walker/cane).   Ambulatory status/function: Ambulates with assistance:  Walker   EARLY MOBILITY ASSESSMENT: Recommend an assessment from physical therapy and/or occupational therapy  ANTICIPATED DISCHARGE: 24-48 hours.  ANTICIPATED DISPOSITION: Home with Home Healthcare  EMERGENCY CONTACT/SURROGATE DECISION MAKER: Chaz Jaimes [phone]    CRITICAL CARE WAS PERFORMED FOR THIS ENCOUNTER: NO.    Signed By: DAYA Mehta - RADHA     April 16, 2024       Please note that this dictation may have been completed with Dragon, the computer voice recognition software.  Quite often unanticipated grammatical, syntax, homophones, and other interpretive errors are inadvertently transcribed by the computer software.  Please disregard these errors.  Please excuse any errors that have escaped final proofreading.

## 2024-04-16 NOTE — CARE COORDINATION
Care Management Initial Assessment       RUR: 36% High   Readmission? No  1st IM letter given?   1st  letter given: No n/a       04/16/24 1544   Service Assessment   Patient Orientation Alert and Oriented;Person;Place;Situation;Self   Cognition Alert   History Provided By Patient;Medical Record   Primary Caregiver Self  (Son lives with pt - assists with some ADLs)   Accompanied By/Relationship n/a   Support Systems Children   Patient's Healthcare Decision Maker is: Legal Next of Kin   PCP Verified by CM Yes   Last Visit to PCP Within last 3 months   Prior Functional Level Assistance with the following:;Bathing;Dressing;Mobility;Housework;Cooking;Shopping   Current Functional Level Assistance with the following:;Bathing;Dressing;Cooking;Housework;Shopping;Mobility   Can patient return to prior living arrangement Yes   Ability to make needs known: Good   Family able to assist with home care needs: Yes   Would you like for me to discuss the discharge plan with any other family members/significant others, and if so, who? Yes  (Dgt is primary emergency/decision making contact Chaz Aguilar [phone])   Financial Resources Medicare   Social/Functional History   Lives With Son  (Son lives in pt's home with her)   Type of Home House   Home Layout One level   Home Access Stairs to enter with rails   Entrance Stairs - Number of Steps 4   Entrance Stairs - Rails Both   Bathroom Shower/Tub Tub/Shower unit   Bathroom Toilet Standard   Home Equipment Cane;Rollator;Walker, rolling   Receives Help From Family  (Three children - two sons and a daughter)   Active  No     CM met with pt to complete initial assessment. Pt received laying in bed, pleasant and very engaging. A&O x4. Pt resides in her one level home, son lives with her and provides assistance with ADLs. Pt requires assistance with bathing (falls have happened in bathroom), and housemaking. Min assist with dressing and uses rollator for ambulation.     PD

## 2024-04-16 NOTE — ED TRIAGE NOTES
BIBA from home, pt presents to ed per family request. Pt has no current complaints, however family states they are concerned because pt had hot flashes and nausea earlier today. They believe patient is having a strove. NIH unremarkable. No distress noted.

## 2024-04-16 NOTE — PLAN OF CARE
Problem: Physical Therapy - Adult  Goal: By Discharge: Performs mobility at highest level of function for planned discharge setting.  See evaluation for individualized goals.  Description: FUNCTIONAL STATUS PRIOR TO ADMISSION: Patient reports she was able to ambulate around her home with a rollator independently, but occasionally needs help for bed mobility from family. She completes bathing with supervision but Min A for transfers onto shower chair and needs some assist for LB dressing.     HOME SUPPORT PRIOR TO ADMISSION: The patient lived with her son who provides some assistance to her.    Physical Therapy Goals  Initiated 4/16/2024  1.  Patient will move from supine to sit and sit to supine, scoot up and down, and roll side to side in bed with contact guard assist consistently within 7 day(s).    2.  Patient will perform sit to stand with modified independence within 7 day(s).  3.  Patient will transfer from bed to chair and chair to bed with modified independence using the least restrictive device within 7 day(s).  4.  Patient will ambulate with modified independence for 50 feet with the least restrictive device within 7 day(s).   5.  Patient will ascend/descend 4 stairs with handrail(s) with contact guard assist within 7 day(s).    4/16/2024 1354 by Kaelyn Shields, PT  Outcome: Progressing     Problem: Occupational Therapy - Adult  Goal: By Discharge: Performs self-care activities at highest level of function for planned discharge setting.  See evaluation for individualized goals.  Description: FUNCTIONAL STATUS PRIOR TO ADMISSION:  Patient was ambulatory using RW   , ADL Assistance: Needs assistance, Bath: Supervision, Dressing: Minimal assistance,  ,  ,  , Homemaking Assistance: Needs assistance, Ambulation Assistance: Independent (with rollator), Transfer Assistance: Needs assistance (sometimes needs assist for bed mobility & transfers but not all of the time), Active : No     HOME SUPPORT:  Patient lived son who assisted with ADLS/IADLS.    Occupational Therapy Goals:  Initiated 4/16/2024  1.  Patient will perform self-feeding with Set-up within 7 day(s).  2.  Patient will perform grooming with Minimal Assist within 7 day(s).  3.  Patient will perform bathing with Minimal Assist within 7 day(s).  4.  Patient will perform toilet transfers with Minimal Assist  within 7 day(s).  5.  Patient will perform all aspects of toileting with Minimal Assist within 7 day(s).  6.  Patient will participate in upper extremity therapeutic exercise/activities with Minimal Assist for 5 minutes within 7 day(s).    7.  Patient will utilize energy conservation techniques during functional activities with verbal cues within 7 day(s).   4/16/2024 1500 by Amita Mercer OT  Outcome: Progressing     Problem: Safety - Adult  Goal: Free from fall injury  Outcome: Progressing     Problem: Discharge Planning  Goal: Discharge to home or other facility with appropriate resources  Outcome: Progressing

## 2024-04-16 NOTE — CONSULTS
SubCUTAneous 3 times per day     Current Outpatient Medications   Medication Sig    fluticasone-salmeterol (ADVAIR) 250-50 MCG/ACT AEPB diskus inhaler Inhale 1 puff into the lungs 2 times daily.    insulin NPH (HUMULIN N;NOVOLIN N) 100 UNIT/ML injection vial Inject 10 Units into the skin every 12 hours (Patient not taking: Reported on 3/19/2024)    Vitamin D (CHOLECALCIFEROL) 25 MCG (1000 UT) TABS tablet Take 5 tablets by mouth daily (Patient not taking: Reported on 3/19/2024)    thiamine 100 MG tablet Take 1 tablet by mouth daily (Patient not taking: Reported on 3/19/2024)    epoetin mohan (EPOGEN;PROCRIT) 51534 UNIT/ML injection Inject 1 mL into the skin Once a week at 5 PM (Patient not taking: Reported on 3/19/2024)    lidocaine 4 % external patch Place 1 patch onto the skin daily (Patient not taking: Reported on 3/19/2024)    gentamicin (GARAMYCIN) 0.1 % cream Apply topically 3 times daily. (Patient not taking: Reported on 3/19/2024)    apixaban (ELIQUIS) 2.5 MG TABS tablet Take 1 tablet by mouth 2 times daily    acetaminophen (TYLENOL) 500 MG CAPS capsule Take 2 capsules by mouth every 6 hours as needed for Pain (mild)    mometasone-formoterol (DULERA) 100-5 MCG/ACT inhaler Inhale 2 puffs into the lungs 2 times daily    Dulaglutide (TRULICITY) 0.75 MG/0.5ML SOPN 0.5 milliliters inject under the skin each week on Monday    pantoprazole (PROTONIX) 40 MG tablet Take 1 tablet by mouth 2 times daily (before meals)    aspirin 81 MG chewable tablet Take 1 tablet by mouth daily    carvedilol (COREG) 6.25 MG tablet Take 1 tablet by mouth 2 times daily (with meals) Hold for HR < 60 or SBP < 110 (Patient taking differently: Take 12.5 mg by mouth 2 times daily (with meals). Hold for HR < 60 or SBP < 110)    melatonin 3 MG TABS tablet Take 2 tablets by mouth nightly as needed (insomnia)    magnesium oxide (MAG-OX) 400 (240 Mg) MG tablet Take 1 tablet by mouth 2 times daily    Blood Glucose Monitoring Suppl (BLOOD GLUCOSE  MONITOR SYSTEM) w/Device KIT 1 Device by Does not apply route 4 times daily (before meals and nightly) Pharmacist to identify preferred meter and strips. (Patient not taking: Reported on 9/19/2023)    Lancets 30G MISC Test BG 4 times a day before meals and at bedtime. Dispense sufficient amount for indicated testing frequency plus additional to accommodate PRN testing needs.    Pharmacist to identify preferred brand. (Patient not taking: Reported on 9/19/2023)    folic acid (FOLVITE) 1 MG tablet Take 1 tablet by mouth daily    albuterol sulfate HFA (PROVENTIL;VENTOLIN;PROAIR) 108 (90 Base) MCG/ACT inhaler Inhale 2 puffs into the lungs every 6 hours as needed for Shortness of Breath or Wheezing    DULoxetine (CYMBALTA) 20 MG extended release capsule Take 1 capsule by mouth 2 times daily    ipratropium-albuterol (DUONEB) 0.5-2.5 (3) MG/3ML SOLN nebulizer solution Inhale 3 mLs into the lungs every 6 hours as needed for Shortness of Breath or Wheezing    montelukast (SINGULAIR) 10 MG tablet Take 1 tablet by mouth nightly    rosuvastatin (CRESTOR) 40 MG tablet Take 1 tablet by mouth daily      Allergies   Allergen Reactions    Fd&C Blue #1 (Brilliant Blue) Hives    Marinol [Dronabinol] Hallucinations    Morphine Hallucinations    Iodinated Contrast Media Hives       Objective:  Vitals:    Vitals:    04/16/24 0530 04/16/24 0600 04/16/24 0700 04/16/24 0845   BP: 136/84 139/83 138/80 125/83   Pulse: 93 94 95 96   Resp: 15 18 15 15   Temp:       TempSrc:       SpO2: 100% 100% 98%      Intake and Output:  No intake/output data recorded.  No intake/output data recorded.    Physical Examination:  General: NAD,Conversant   Neck:  Supple, no mass  Resp:  Lungs CTA B/L, no wheezing , normal respiratory effort  CV:  RRR,  no murmur or rub, LE edema  GI:  Soft, NT, + BS, PD cath exit site clean  Neurologic:  Non focal  Psych:             AAO x 3 appropriate affect   Skin:  No Rash    []    High complexity decision making was

## 2024-04-16 NOTE — ED PROVIDER NOTES
[facility] EMERGENCY DEP  EMERGENCY DEPARTMENT ENCOUNTER      Pt Name: Galilea Aguilar  MRN: [id number]  Birthdate 1955  Date of evaluation: 4/16/2024  Provider: Jc Nelson MD    CHIEF COMPLAINT       Chief Complaint   Patient presents with    Nausea         HISTORY OF PRESENT ILLNESS   (Location/Symptom, Timing/Onset, Context/Setting, Quality, Duration, Modifying Factors, Severity)  Note limiting factors.   68F w/ hx ESRD on PD, anemia, CAD s/p CABG, CVA, DM, COPD, HTN, HLD p/w 1d AMS. Pt here w/ daughter who reports confusion and disorientation. Reports episode of increased sleepiness and seeming to \"nod off.\" This started yesterday morning after having multiple episodes of vomiting and diarrhea. No pain anywhere including no head or abd pain. No F/C, cough, SOB. No urinary symptoms. No drugs/etoh or new/changes to meds. Now seems back to herself. No recent falls or trauma. Doing her PD at home w/o issue.            Review of External Medical Records:     Nursing Notes were reviewed.    REVIEW OF SYSTEMS    (2-9 systems for level 4, 10 or more for level 5)     Review of Systems   Unable to perform ROS: Mental status change       Except as noted above the remainder of the review of systems was reviewed and negative.       PAST MEDICAL HISTORY     Past Medical History:   Diagnosis Date    Asthma     CAD (coronary artery disease)     COPD (chronic obstructive pulmonary disease) ([facility])     PCP manages    CVA (cerebral vascular accident) ([facility]) 11/01/2023    Patient presented with right-sided weakness.  Per MRI of the brain on 11/1/2023, acute versus subacute embolic infarctions involving the left MCA territory and right PICA territory    Diabetes mellitus, type 2 (HCC)     ESRD on peritoneal dialysis ([facility])     Eduardo (Tammie-PD nurse) [facility] [phone]- was on hemodialysis M-W-F Eduardo - now on PD    GERD (gastroesophageal reflux disease)     History of blood transfusion     Hyperlipidemia     Hypertension

## 2024-04-16 NOTE — PLAN OF CARE
Problem: Occupational Therapy - Adult  Goal: By Discharge: Performs self-care activities at highest level of function for planned discharge setting.  See evaluation for individualized goals.  Description: FUNCTIONAL STATUS PRIOR TO ADMISSION:  Patient was ambulatory using RW   , ADL Assistance: Needs assistance, Bath: Supervision, Dressing: Minimal assistance,  ,  ,  , Homemaking Assistance: Needs assistance, Ambulation Assistance: Independent (with rollator), Transfer Assistance: Needs assistance (sometimes needs assist for bed mobility & transfers but not all of the time), Active : No     HOME SUPPORT: Patient lived son who assisted with ADLS/IADLS.    Occupational Therapy Goals:  Initiated 4/16/2024  1.  Patient will perform self-feeding with Set-up within 7 day(s).  2.  Patient will perform grooming with Minimal Assist within 7 day(s).  3.  Patient will perform bathing with Minimal Assist within 7 day(s).  4.  Patient will perform toilet transfers with Minimal Assist  within 7 day(s).  5.  Patient will perform all aspects of toileting with Minimal Assist within 7 day(s).  6.  Patient will participate in upper extremity therapeutic exercise/activities with Minimal Assist for 5 minutes within 7 day(s).    7.  Patient will utilize energy conservation techniques during functional activities with verbal cues within 7 day(s).   Outcome: Progressing   OCCUPATIONAL THERAPY EVALUATION    Patient: Galilea Aguilar (68 y.o. female)  Date: 4/16/2024  Primary Diagnosis: AMS (altered mental status) [R41.82]         Precautions: Fall Risk                  ASSESSMENT :  The patient is limited by decreased functional mobility, independence in ADLs, high-level IADLs, strength, body mechanics, activity tolerance, endurance, safety awareness, cognition, coordination, balance, posture, orthostatic hypotension s/p admission for AMS. Pt received semi-reclined in ED bed, agreeable to therapy, cleared by RN. Pt was Min x2 for

## 2024-04-16 NOTE — FLOWSHEET NOTE
PD RAIN AND SPECIMEN COLLECTION    04/16/24 0930   Vitals   Pulse 95   Respirations 16   SpO2 100 %   Observations & Evaluations   Level of Consciousness 0   Heart Rhythm   (bedside telemetry)   Respiratory Quality/Effort Unlabored   O2 Device None (Room air)   Skin Condition/Temp Dry;Warm   Abdomen Inspection Soft   Peritoneal Dialysis Catheter Mid lower abdomen   No placement date or time found.   Catheter Location: Mid lower abdomen   Status Accessed   Dressing Status Clean, dry & intact   Dressing Gauze   Dialysis Type Continuous cycling   Catheter Care Given Yes   Peritoneal Dialysis (CAPD manual)   Effluent Appearance Clear;Yellow  (specimen obtained and delivered to lab)   Effluent Volume Out (mL) 2000 ml     Time Out (time): ICEBOAT 0915  Primary RN SBAR: TRUDY Lindsay RN   Patient Education: procedural specimen collection    Comments:  PD drain (patient states she was in drain 2 last night when her family stopped treatment and brought her to ER.  Fluid drained 2000 ml clear;yellow specimen obtained as ordered.  One minute alcavis scrub followed by one minute soak, sterile mini cap. SBAR with primary nurse.    Hepatitis B Surface Ag   Date/Time Value Ref Range Status   02/26/2024 04:19 AM <0.10 Index Final     Hep B S Ab   Date/Time Value Ref Range Status   02/29/2024 07:51 AM <3.10 mIU/mL Final

## 2024-04-16 NOTE — PLAN OF CARE
Problem: Physical Therapy - Adult  Goal: By Discharge: Performs mobility at highest level of function for planned discharge setting.  See evaluation for individualized goals.  Description: FUNCTIONAL STATUS PRIOR TO ADMISSION: Patient reports she was able to ambulate around her home with a rollator independently, but occasionally needs help for bed mobility from family. She completes bathing with supervision but Min A for transfers onto shower chair and needs some assist for LB dressing.     HOME SUPPORT PRIOR TO ADMISSION: The patient lived with her son who provides some assistance to her.    Physical Therapy Goals  Initiated 4/16/2024  1.  Patient will move from supine to sit and sit to supine, scoot up and down, and roll side to side in bed with contact guard assist consistently within 7 day(s).    2.  Patient will perform sit to stand with modified independence within 7 day(s).  3.  Patient will transfer from bed to chair and chair to bed with modified independence using the least restrictive device within 7 day(s).  4.  Patient will ambulate with modified independence for 50 feet with the least restrictive device within 7 day(s).   5.  Patient will ascend/descend 4 stairs with handrail(s) with contact guard assist within 7 day(s).    Outcome: Progressing   PHYSICAL THERAPY EVALUATION    Patient: Galilea Aguilar (68 y.o. female)  Date: 4/16/2024  Primary Diagnosis: AMS (altered mental status) [R41.82]       Precautions: Restrictions/Precautions: Fall Risk                      ASSESSMENT :   DEFICITS/IMPAIRMENTS:   The patient is limited by decreased functional mobility, independence in ADLs, strength, body mechanics, activity tolerance, safety awareness, cognition, command following, attention/concentration, balance, orthostatic hypotension     Based on the impairments listed above patient is likely near her functional baseline, but is at a high risk of falls with significant safety concerns with mobility.  12/7/2021    IR TUNNELED CATHETER PLACEMENT GREATER THAN 5 YEARS 12/7/2021 [facility] RAD ANGIO IR    IR TUNNELED CATHETER PLACEMENT GREATER THAN 5 YEARS  12/07/2021    peritoneal dialysis    PA UNLISTED PROCEDURE CARDIAC SURGERY      UPPER GASTROINTESTINAL ENDOSCOPY N/A 8/16/2023    EGD BIOPSY performed by Drew Pizarro MD at [facility] ENDOSCOPY    UPPER GASTROINTESTINAL ENDOSCOPY N/A 10/17/2023    EGD ESOPHAGOGASTRODUODENOSCOPY performed by Jared Griffiths MD at [facility] ENDOSCOPY    VASCULAR SURGERY      leg stent       Home Situation:  Social/Functional History  Lives With: Son  Type of Home: House  Home Layout: One level  Home Access: Stairs to enter with rails  Entrance Stairs - Number of Steps: 4  Entrance Stairs - Rails: Both  Bathroom Shower/Tub: Tub/Shower unit  Bathroom Equipment: Shower chair  Home Equipment: Rollator  Has the patient had two or more falls in the past year or any fall with injury in the past year?: Yes (Reports 4 falls in the last year, typically in the bathroom)  ADL Assistance: Needs assistance  Bath: Supervision  Dressing: Minimal assistance  Homemaking Assistance: Needs assistance  Ambulation Assistance: Independent (with rollator)  Transfer Assistance: Needs assistance (sometimes needs assist for bed mobility & transfers but not all of the time)  Active : No    Cognitive/Behavioral Status:  Orientation  Overall Orientation Status: Within Normal Limits  Orientation Level: Oriented X4  Cognition  Overall Cognitive Status: Exceptions  Arousal/Alertness: Appropriate responses to stimuli  Following Commands: Follows one step commands with increased time;Follows one step commands with repetition  Attention Span: Attends with cues to redirect  Safety Judgement: Decreased awareness of need for assistance;Decreased awareness of need for safety  Problem Solving: Decreased awareness of errors;Assistance required to generate solutions;Assistance required to implement solutions  Insights: Decreased

## 2024-04-17 ENCOUNTER — HOME CARE VISIT (OUTPATIENT)
Dept: HOME HEALTH SERVICES | Facility: HOME HEALTH | Age: 69
End: 2024-04-17
Payer: MEDICARE

## 2024-04-17 NOTE — PLAN OF CARE
Problem: Physical Therapy - Adult  Goal: By Discharge: Performs mobility at highest level of function for planned discharge setting.  See evaluation for individualized goals.  Description: FUNCTIONAL STATUS PRIOR TO ADMISSION: Patient reports she was able to ambulate around her home with a rollator independently, but occasionally needs help for bed mobility from family. She completes bathing with supervision but Min A for transfers onto shower chair and needs some assist for LB dressing.     HOME SUPPORT PRIOR TO ADMISSION: The patient lived with her son who provides some assistance to her.    Physical Therapy Goals  Initiated 4/16/2024  1.  Patient will move from supine to sit and sit to supine, scoot up and down, and roll side to side in bed with contact guard assist consistently within 7 day(s).    2.  Patient will perform sit to stand with modified independence within 7 day(s).  3.  Patient will transfer from bed to chair and chair to bed with modified independence using the least restrictive device within 7 day(s).  4.  Patient will ambulate with modified independence for 50 feet with the least restrictive device within 7 day(s).   5.  Patient will ascend/descend 4 stairs with handrail(s) with contact guard assist within 7 day(s).    4/16/2024 1354 by Kaelyn Shields, PT  Outcome: Progressing     Problem: Occupational Therapy - Adult  Goal: By Discharge: Performs self-care activities at highest level of function for planned discharge setting.  See evaluation for individualized goals.  Description: FUNCTIONAL STATUS PRIOR TO ADMISSION:  Patient was ambulatory using RW   , ADL Assistance: Needs assistance, Bath: Supervision, Dressing: Minimal assistance,  ,  ,  , Homemaking Assistance: Needs assistance, Ambulation Assistance: Independent (with rollator), Transfer Assistance: Needs assistance (sometimes needs assist for bed mobility & transfers but not all of the time), Active : No     HOME SUPPORT:

## 2024-04-17 NOTE — PROGRESS NOTES
(ZOFRAN-ODT) disintegrating tablet 4 mg  4 mg Oral Q8H PRN    Or    ondansetron (ZOFRAN) injection 4 mg  4 mg IntraVENous Q6H PRN    polyethylene glycol (GLYCOLAX) packet 17 g  17 g Oral Daily PRN    acetaminophen (TYLENOL) tablet 650 mg  650 mg Oral Q6H PRN    Or    acetaminophen (TYLENOL) suppository 650 mg  650 mg Rectal Q6H PRN    heparin (porcine) injection 5,000 Units  5,000 Units SubCUTAneous 3 times per day    dianeal lo-brandi (ULTRABAG) 2.5% 6,000 mL  6,000 mL IntraPERitoneal Daily    dianeal lo-brandi (ULTRABAG) 2.5% 6,000 mL  6,000 mL IntraPERitoneal Daily    0.9 % sodium chloride infusion   IntraVENous Continuous    gentamicin (GARAMYCIN) 0.1 % cream   Topical PRN    dianeal lo-brandi 2.5% 6,000 mL  6,000 mL IntraPERitoneal Q24H    pramox-PE-glycerin-petrolatum cream   Rectal BID PRN    melatonin tablet 3 mg  3 mg Oral Nightly PRN      Allergies   Allergen Reactions    Fd&C Blue #1 (Brilliant Blue) Hives    Marinol [Dronabinol] Hallucinations    Morphine Hallucinations    Iodinated Contrast Media Hives       Objective:  Vitals:    Vitals:    04/17/24 1000 04/17/24 1051 04/17/24 1053 04/17/24 1059   BP:  (!) 171/95 (!) 134/100 135/77   Pulse: (!) 103 (!) 104 (!) 106 (!) 106   Resp:       Temp:       TempSrc:       SpO2:       Weight:         Intake and Output:  04/17 0701 - 04/17 1900  In: -   Out: 1184   04/15 1901 - 04/17 0700  In: -   Out: 2000     Physical Examination:  General: NAD,Conversant   Neck:  Supple, no mass  Resp:  Lungs CTA B/L, no wheezing , normal respiratory effort  CV:  RRR,  no murmur or rub, LE edema  GI:  Soft, NT, + BS, PD cath exit site clean  Neurologic:  Non focal  Psych:             AAO x 3 appropriate affect   Skin:  No Rash    []    High complexity decision making was performed  []    Patient is at high-risk of decompensation with multiple organ involvement    Lab Data Personally Reviewed: I have reviewed all the pertinent labs, microbiology data and radiology studies during  assessment.    Labs:  Recent Labs     04/16/24  0300 04/16/24  0315 04/17/24  0105   * 134* 136   K 5.9* 5.2* 4.4    103 104   CO2 22 25 23   GLUCOSE 135* 142* 158*   BUN 16 14 17   CREATININE 5.60* 5.54* 5.65*   CALCIUM 8.9 8.6 8.2*         Recent Labs     04/16/24  0300 04/17/24  0105   WBC 6.8 6.3   RBC 3.76* 3.37*   HGB 11.1* 9.6*   HCT 36.3 32.8*   MCV 96.5 97.3   MCH 29.5 28.5   MCHC 30.6 29.3*   RDW 17.2* 17.2*   * 108*   MPV 9.9 10.7       Recent Labs     04/16/24  0300   GLOB 5.8*       No results for input(s): \"INR\", \"APTT\" in the last 72 hours.    Invalid input(s): \"PTP\"   No results for input(s): \"CPK\", \"CKMB\", \"TROPONINI\" in the last 72 hours.    Invalid input(s): \"B-NP\"  Invalid input(s): \"PHI\", \"PCO2I\", \"PO2I\", \"FIO2I\"     Ventilator:       Microbiology:  No results found for: \"SDES\"  No components found for: \"CULT\"      I have reviewed the flowsheets.  Chart and Pertinent Notes have been reviewed.   No change in PMH ,family and social history from Consult note.      Virginia Fragoso, APRN - NP  [facility]

## 2024-04-17 NOTE — PLAN OF CARE
Problem: Physical Therapy - Adult  Goal: By Discharge: Performs mobility at highest level of function for planned discharge setting.  See evaluation for individualized goals.  Description: FUNCTIONAL STATUS PRIOR TO ADMISSION: Patient reports she was able to ambulate around her home with a rollator independently, but occasionally needs help for bed mobility from family. She completes bathing with supervision but Min A for transfers onto shower chair and needs some assist for LB dressing.     HOME SUPPORT PRIOR TO ADMISSION: The patient lived with her son who provides some assistance to her.    Physical Therapy Goals  Initiated 4/16/2024  1.  Patient will move from supine to sit and sit to supine, scoot up and down, and roll side to side in bed with contact guard assist consistently within 7 day(s).    2.  Patient will perform sit to stand with modified independence within 7 day(s).  3.  Patient will transfer from bed to chair and chair to bed with modified independence using the least restrictive device within 7 day(s).  4.  Patient will ambulate with modified independence for 50 feet with the least restrictive device within 7 day(s).   5.  Patient will ascend/descend 4 stairs with handrail(s) with contact guard assist within 7 day(s).    Outcome: Progressing   PHYSICAL THERAPY TREATMENT    Patient: Galilea Aguilar (68 y.o. female)  Date: 4/17/2024  Diagnosis: Hyperkalemia [E87.5]  Lactic acidosis [E87.20]  ESRD on peritoneal dialysis (HCC) [N18.6, Z99.2]  Altered mental status, unspecified altered mental status type [R41.82]  AMS (altered mental status) [R41.82] AMS (altered mental status)      Precautions: Fall Risk                      ASSESSMENT:  Patient continues to benefit from skilled PT services and is progressing towards goals. Patient was received in bed, with significant change in mental status compared to yesterday's PT evaluation. Patient with active hallucinations both before & during session  Training: No      Pain Rating:  Does not quantify, endorses pain in B legs/hips  Pain Intervention(s):   nursing notified, rest, repositioning, and pain is at a level acceptable to the patient    Activity Tolerance:   Fair , requires rest breaks, and signs and symptoms of orthostatic hypotension - reports dizziness with mobility, RN aware of symptoms & vitals     04/17/24 1051 04/17/24 1053 04/17/24 1059   Vitals   Pulse (!) 104 (!) 106 (!) 106   BP (!) 171/95 (!) 134/100 135/77   MAP (Calculated) 120 111 96   Patient Position Semi fowlers Sitting Sitting  (on BSC)     After treatment:   Patient left in no apparent distress in bed, Call bell within reach, Bed/ chair alarm activated, Side rails x3, Updated patient's board on functional status and mobility recommendations, and RN aware      COMMUNICATION/EDUCATION:   The patient's plan of care was discussed with: occupational therapist and registered nurse    Patient Education  Education Given To: Patient  Education Provided: Role of Therapy;Plan of Care;Fall Prevention Strategies;Transfer Training;Orientation  Education Provided Comments: Reorientation, safety with mobility  Education Method: Verbal  Barriers to Learning: Cognition  Education Outcome: Unable to verbalize;Unable to demonstrate understanding;Continued education needed      Kaelyn Shields, PT  Minutes: 26

## 2024-04-17 NOTE — PLAN OF CARE
Problem: Physical Therapy - Adult  Goal: By Discharge: Performs mobility at highest level of function for planned discharge setting.  See evaluation for individualized goals.  Description: FUNCTIONAL STATUS PRIOR TO ADMISSION: Patient reports she was able to ambulate around her home with a rollator independently, but occasionally needs help for bed mobility from family. She completes bathing with supervision but Min A for transfers onto shower chair and needs some assist for LB dressing.     HOME SUPPORT PRIOR TO ADMISSION: The patient lived with her son who provides some assistance to her.    Physical Therapy Goals  Initiated 4/16/2024  1.  Patient will move from supine to sit and sit to supine, scoot up and down, and roll side to side in bed with contact guard assist consistently within 7 day(s).    2.  Patient will perform sit to stand with modified independence within 7 day(s).  3.  Patient will transfer from bed to chair and chair to bed with modified independence using the least restrictive device within 7 day(s).  4.  Patient will ambulate with modified independence for 50 feet with the least restrictive device within 7 day(s).   5.  Patient will ascend/descend 4 stairs with handrail(s) with contact guard assist within 7 day(s).    4/17/2024 1121 by Kaelyn Shields, PT  Outcome: Progressing     Problem: Occupational Therapy - Adult  Goal: By Discharge: Performs self-care activities at highest level of function for planned discharge setting.  See evaluation for individualized goals.  Description: FUNCTIONAL STATUS PRIOR TO ADMISSION:  Patient was ambulatory using RW   , ADL Assistance: Needs assistance, Bath: Supervision, Dressing: Minimal assistance,  ,  ,  , Homemaking Assistance: Needs assistance, Ambulation Assistance: Independent (with rollator), Transfer Assistance: Needs assistance (sometimes needs assist for bed mobility & transfers but not all of the time), Active : No     HOME SUPPORT:

## 2024-04-17 NOTE — FLOWSHEET NOTE
PD CONNECTION    04/16/24 2030   Vitals   BP (!) 155/87   Temp 97.7 °F (36.5 °C)   Temp Source Oral   Pulse 100   Respirations 16   SpO2 100 %   Observations & Evaluations   Level of Consciousness 0   Oriented X answers questions and then talks to people in the corner who are not there   Heart Rhythm Regular   Respiratory Quality/Effort Unlabored   O2 Device None (Room air)   Skin Condition/Temp Warm;Dry   Abdomen Inspection Soft   Edema None   Peritoneal Dialysis Catheter Mid lower abdomen   No placement date or time found.   Catheter Location: Mid lower abdomen   Status Accessed   Site Condition Clean, dry, intact   Dressing Status New dressing applied   Dressing Gauze  (gentamicin cream)   Dialysis Type Continuous cycling   Exit Site Condition excellent   Catheter Care Given Yes   Cycler   Verification of Prescription CCPD   Informed Consent    (chronic consent convesy)   Total Volume Programmed 90682 mL   Therapy Time (Hours:Minutes) 10   Cycler Type [facility]   Last Fill Volume 0 mL   Dextrose Setting Same (Nonextraneal)   I Drain Alarm 0 mL   Number of Cycles 5   Bag Volume 6000 mL   Number of Bags Used 3   Dianeal Solution   (2.5%)     Time Out (time): ICEBOAT 2015  Primary RN SBAR: HAIR Mike RN  Patient Education: infection control wearing mask initiation and discontinuation   Hepatitis B Surface Ag   Date/Time Value Ref Range Status   02/26/2024 04:19 AM <0.10 Index Final     Hep B S Ab   Date/Time Value Ref Range Status   04/16/2024 10:09 AM <3.10 mIU/mL Final      Comments: orders, labs, consent chronic consent conveys and code status reviewed.  PD initiated as ordered.  SBAR with primary nurse.    Machine SN [id number]  Cartridge lot number:  [id number]  START TIME:  2100  END:  0700   no

## 2024-04-17 NOTE — CARE COORDINATION
Transition of Care Plan:    RUR: 37%  Prior Level of Functioning: Assistance with bathing and housemaking.  Disposition: Therapy recommending SNF.  CM spoke to patient;s daughter,Chaz to discuss SNF.  Daughter would like patient to come home with HH at discharge as PD is a barrier to SNF acceptance.  Home with [facility] Lozada .  beatriz GOODE confirmed pt open with them. Please update BS on dc plan when it is known.   [facility]  P: [phone] Option #2     If SNF or IPR: Date FOC offered:   Date FOC received:   Accepting facility:   Date authorization started with reference number:   Date authorization received and expires:   Follow up appointments: PCP/Specialist  DME needed: No  Transportation at discharge: Family  IM/IMM Medicare/ letter given: Not given on admission  Is patient a  and connected with VA? No   If yes, was  transfer form completed and VA notified?   Caregiver Contact: Chaz [phone]   Discharge Caregiver contacted prior to discharge? Yes  Care Conference needed? No  Barriers to discharge:  Iqra GOODE confirmed pt open with them. Please update BS on dc plan when it is known.   [facility]  P: [phone] Option #2     Henny Jones RN/CRM  [phone]

## 2024-04-17 NOTE — PROGRESS NOTES
Hospitalist Progress Note  DAYA Mehta - NP  Answering service: [phone]        Date of Service:  2024  NAME:  Galilea Aguilar  :  1955  MRN:  [id number]    Admission Summary:     Ms. Aguilar is a 68-year-old female with a past medical history of ESRD (on PD), anemia, CAD (status post CABG), CVA, DM, COPD, hypertension, hyperlipidemia who presented with a 1 day history of altered mental status.  Patient's daughter reported patient had confusion and disorientation with an episode of increased sleepiness and seeming to \"nod off\" + reported vomiting and diarrhea.  In the ED, patient appeared to be back to her baseline.  And reportedly doing her PD without issues.    Interval history / Subjective:        Patient was seen and examined this morning, nurse was present at bedside.  Patient reporting seeing bugs crawling on her tray table, her bed and the walls.  She also thinks that her daughter is in the room standing to her left.  She is otherwise oriented to the year, place.  She has no complaints of pain, no shortness of breath.    Called patient's daughter, Chaz, she reports that her mother has intermittent issues where she has hallucinations, speaks to people that have passed away and has visual disturbances.  She feels as though these hallucinations start when she is starting to have some sort of infection.  Medication reconciliation completed with daughter over the phone, only outstanding medication is what type of insulin patient takes and daughter is to look at this once she gets home.  Discussed plan of care, ordered CT scan of the abdomen and pelvis, ordered blood cultures.  Urinalysis does not indicate that she has any source of infection.    Assessment & Plan:     Altered Mental Status  - chest Xray with no acute process  - CT head no acute intracranial process  - UA with no evidence of

## 2024-04-17 NOTE — FLOWSHEET NOTE
PD DISCONNECTION   04/17/24 0839   Vitals   BP (!) 158/85   Pulse (!) 103   Respirations 15   Observations & Evaluations   Level of Consciousness 0   Oriented X patient seeing horses and buggies outside her window   Heart Rhythm Regular  (bedside telemetry)   Respiratory Quality/Effort Unlabored   O2 Device None (Room air)   Skin Condition/Temp Warm;Dry   Abdomen Inspection Soft   Edema None   Peritoneal Dialysis Catheter Mid lower abdomen   No placement date or time found.   Catheter Location: Mid lower abdomen   Status Deaccessed   Dressing Status Clean, dry & intact   Dressing Gauze   Date of Last Dressing Change 04/16/24   Dialysis Type Continuous cycling   Catheter Care Given Yes   Post-Treatment (Cycler)   Average Dwell Time (Hours:Minutes) 1:23   Lost Dwell Time (Hours:Minutes) 0:19   Effluent Appearance Clear;Yellow   PD Output (mL) 1184 mL  ( + = 1184)     Time Out (time): ICEBOAT 0815  Primary RN SBAR: EVELYN Almonte RN   Patient Education: infection control wearing mask disconnecting.  Hepatitis B Surface Ag   Date/Time Value Ref Range Status   02/26/2024 04:19 AM <0.10 Index Final     Hep B S Ab   Date/Time Value Ref Range Status   04/16/2024 10:09 AM <3.10 mIU/mL Final      COMMENTS:  on arrival end of therapy, evaluated UF average lower than usual for this patient reposition and performed a manual drain with success.  One minute alcavis scrub followed by one minute soak and sterile minicap.  Call bell within reach.  SBAR with primary nurse to include patient seeing horses and buggies outside her window.    Repeat hepatitis b antigen ordered after speaking to lab, they do not know what happened to results from yesterday am.

## 2024-04-17 NOTE — PLAN OF CARE
Problem: Occupational Therapy - Adult  Goal: By Discharge: Performs self-care activities at highest level of function for planned discharge setting.  See evaluation for individualized goals.  Description: FUNCTIONAL STATUS PRIOR TO ADMISSION:  Patient was ambulatory using RW   , ADL Assistance: Needs assistance, Bath: Supervision, Dressing: Minimal assistance,  ,  ,  , Homemaking Assistance: Needs assistance, Ambulation Assistance: Independent (with rollator), Transfer Assistance: Needs assistance (sometimes needs assist for bed mobility & transfers but not all of the time), Active : No     HOME SUPPORT: Patient lived son who assisted with ADLS/IADLS.    Occupational Therapy Goals:  Initiated 4/16/2024  1.  Patient will perform self-feeding with Set-up within 7 day(s).  2.  Patient will perform grooming with Minimal Assist within 7 day(s).  3.  Patient will perform bathing with Minimal Assist within 7 day(s).  4.  Patient will perform toilet transfers with Minimal Assist  within 7 day(s).  5.  Patient will perform all aspects of toileting with Minimal Assist within 7 day(s).  6.  Patient will participate in upper extremity therapeutic exercise/activities with Minimal Assist for 5 minutes within 7 day(s).    7.  Patient will utilize energy conservation techniques during functional activities with verbal cues within 7 day(s).   Outcome: Progressing   OCCUPATIONAL THERAPY TREATMENT  Patient: Galilea Aguilar (68 y.o. female)  Date: 4/17/2024  Primary Diagnosis: Hyperkalemia [E87.5]  Lactic acidosis [E87.20]  ESRD on peritoneal dialysis (HCC) [N18.6, Z99.2]  Altered mental status, unspecified altered mental status type [R41.82]  AMS (altered mental status) [R41.82]       Precautions: Fall Risk                Chart, occupational therapy assessment, plan of care, and goals were reviewed.    ASSESSMENT  Patient continues to benefit from skilled OT services and is progressing towards goals. Pt received semi-reclined in  breaks  Please refer to the flowsheet for vital signs taken during this treatment.    After treatment:   Patient left in no apparent distress in bed, Call bell within reach, Bed/ chair alarm activated, and Side rails x3    COMMUNICATION/EDUCATION:   The patient's plan of care was discussed with: physical therapist and registered nurse         Thank you for this referral.  Amita Mercer OT  Minutes: 26

## 2024-04-17 NOTE — FLOWSHEET NOTE
PD DIALYSIS CONNECTION     04/17/24 1630   Vitals   /65   Pulse 96   Respirations 18   SpO2 98 %   Observations & Evaluations   Level of Consciousness 1  (resting with eyes closed responds to voice)   Heart Rhythm Regular   Respiratory Quality/Effort Unlabored   O2 Device None (Room air)   Skin Condition/Temp Warm;Dry   Abdomen Inspection Soft   Edema None   Peritoneal Dialysis Catheter Mid lower abdomen   No placement date or time found.   Catheter Location: Mid lower abdomen   Status Accessed   Site Condition Clean, dry, intact   Dressing Status New dressing applied   Dressing Gauze  (gentamicin cream)   Date of Last Dressing Change 04/17/24   Dialysis Type Continuous cycling   Exit Site Condition excellent   Catheter Care Given Yes   Cycler   Verification of Prescription CCPD   Informed Consent    (chronic consent applies)   Total Volume Programmed 67355 mL   Therapy Time (Hours:Minutes) 10   Cycler Type Jung HomeChoice   Dextrose Setting Same (Nonextraneal)   I Drain Alarm 0 mL   Number of Cycles 5   Bag Volume 6000 mL   Number of Bags Used 3   Dianeal Solution   ((bag 1 and 3 ) 2.5% bag two 1.5%)     Time Out (time): ICEBOAT 1615  Primary RN SBAR: HAIR Mike RN  Patient Education: wearing mask for connection  Hepatitis B Surface Ag   Date/Time Value Ref Range Status   02/26/2024 04:19 AM <0.10 Index Final     Hep B S Ab   Date/Time Value Ref Range Status   04/16/2024 10:09 AM <3.10 mIU/mL Final      Comments:  Hepatitis B surface antigen reordered result did not post, orders, labs, consent and code status reviewed.  PD initiated with changes  noted second bag 1.5%, no other changes, on departure bed in low position, call bell within reach, SBAR with primary nurse.    Start time:  1645  End:  0245

## 2024-04-18 NOTE — PLAN OF CARE
Problem: Safety - Adult  Goal: Free from fall injury  4/18/2024 1254 by Sandy Anders RN  Outcome: Progressing  Flowsheets (Taken 4/18/2024 0800)  Free From Fall Injury: Instruct family/caregiver on patient safety  4/18/2024 0150 by Christina Camacho RN  Outcome: Progressing     Problem: Discharge Planning  Goal: Discharge to home or other facility with appropriate resources  4/18/2024 1254 by Sandy Anders RN  Outcome: Progressing  Flowsheets (Taken 4/18/2024 0800)  Discharge to home or other facility with appropriate resources: Identify barriers to discharge with patient and caregiver  4/18/2024 0150 by Christina Camacho RN  Outcome: Progressing     Problem: Skin/Tissue Integrity  Goal: Absence of new skin breakdown  Description: 1.  Monitor for areas of redness and/or skin breakdown  2.  Assess vascular access sites hourly  3.  Every 4-6 hours minimum:  Change oxygen saturation probe site  4.  Every 4-6 hours:  If on nasal continuous positive airway pressure, respiratory therapy assess nares and determine need for appliance change or resting period.  4/18/2024 1254 by Sandy Anders RN  Outcome: Progressing  4/18/2024 0150 by Christina Camacho RN  Outcome: Progressing     Problem: Pain  Goal: Verbalizes/displays adequate comfort level or baseline comfort level  4/18/2024 1254 by Sandy Anders RN  Outcome: Progressing  4/18/2024 0150 by Christina Camacho RN  Outcome: Progressing     Problem: Chronic Conditions and Co-morbidities  Goal: Patient's chronic conditions and co-morbidity symptoms are monitored and maintained or improved  4/18/2024 1254 by Sandy Anders RN  Outcome: Progressing  Flowsheets (Taken 4/18/2024 0800)  Care Plan - Patient's Chronic Conditions and Co-Morbidity Symptoms are Monitored and Maintained or Improved: Monitor and assess patient's chronic conditions and comorbid symptoms for stability, deterioration, or improvement  4/18/2024 0150 by Christina Camacho RN  Outcome:

## 2024-04-18 NOTE — PLAN OF CARE
2000 Received patient from JANES Brand.    7163 Bedside shift change report given to JANES Delgado (oncoming nurse) by Christina Camacho RN. Report included the following information SBAR, MAR, Recent Results, and Cardiac Rhythm NSR/ST.     Problem: Occupational Therapy - Adult  Goal: By Discharge: Performs self-care activities at highest level of function for planned discharge setting.  See evaluation for individualized goals.  Description: FUNCTIONAL STATUS PRIOR TO ADMISSION:  Patient was ambulatory using RW   , ADL Assistance: Needs assistance, Bath: Supervision, Dressing: Minimal assistance,  ,  ,  , Homemaking Assistance: Needs assistance, Ambulation Assistance: Independent (with rollator), Transfer Assistance: Needs assistance (sometimes needs assist for bed mobility & transfers but not all of the time), Active : No     HOME SUPPORT: Patient lived son who assisted with ADLS/IADLS.    Occupational Therapy Goals:  Initiated 4/16/2024  1.  Patient will perform self-feeding with Set-up within 7 day(s).  2.  Patient will perform grooming with Minimal Assist within 7 day(s).  3.  Patient will perform bathing with Minimal Assist within 7 day(s).  4.  Patient will perform toilet transfers with Minimal Assist  within 7 day(s).  5.  Patient will perform all aspects of toileting with Minimal Assist within 7 day(s).  6.  Patient will participate in upper extremity therapeutic exercise/activities with Minimal Assist for 5 minutes within 7 day(s).    7.  Patient will utilize energy conservation techniques during functional activities with verbal cues within 7 day(s).   4/17/2024 1245 by Amita Mercer, СВЕТЛАНА  Outcome: Progressing     Problem: Safety - Adult  Goal: Free from fall injury  4/18/2024 0150 by Christina Camacho, RN  Outcome: Progressing  4/17/2024 1514 by Sandy Mike RN  Outcome: Progressing     Problem: Discharge Planning  Goal: Discharge to home or other facility with appropriate

## 2024-04-18 NOTE — PLAN OF CARE
Problem: Physical Therapy - Adult  Goal: By Discharge: Performs mobility at highest level of function for planned discharge setting.  See evaluation for individualized goals.  Description: FUNCTIONAL STATUS PRIOR TO ADMISSION: Patient reports she was able to ambulate around her home with a rollator independently, but occasionally needs help for bed mobility from family. She completes bathing with supervision but Min A for transfers onto shower chair and needs some assist for LB dressing.     HOME SUPPORT PRIOR TO ADMISSION: The patient lived with her son who provides some assistance to her.    Physical Therapy Goals  Initiated 4/16/2024  1.  Patient will move from supine to sit and sit to supine, scoot up and down, and roll side to side in bed with contact guard assist consistently within 7 day(s).    2.  Patient will perform sit to stand with modified independence within 7 day(s).  3.  Patient will transfer from bed to chair and chair to bed with modified independence using the least restrictive device within 7 day(s).  4.  Patient will ambulate with modified independence for 50 feet with the least restrictive device within 7 day(s).   5.  Patient will ascend/descend 4 stairs with handrail(s) with contact guard assist within 7 day(s).    Outcome: Progressing  PHYSICAL THERAPY TREATMENT    Patient: Galilea Aguilar (68 y.o. female)  Date: 4/18/2024  Diagnosis: Hyperkalemia [E87.5]  Lactic acidosis [E87.20]  ESRD on peritoneal dialysis (HCC) [N18.6, Z99.2]  Altered mental status, unspecified altered mental status type [R41.82]  AMS (altered mental status) [R41.82] AMS (altered mental status)      Precautions: Fall Risk                      ASSESSMENT:  Patient continues to benefit from skilled PT services and is progressing towards goals. Galilea tolerated today's session fairly. She was received in bed, noted to have improvement in cognition from yesterday (no hallucinations & oriented x 4 today). She continues to be  [] [] []    Long Arc Quads 1 10 [x] [] [] []    Marching   [] [] [] []       [] [] [] []          Pain Rating:  Does not quantify, but endorses back & B hip pain  Pain Intervention(s):   repositioning and pain is at a level acceptable to the patient    Activity Tolerance:   Fair  and signs and symptoms of orthostatic hypotension     04/18/24 1206 04/18/24 1210 04/18/24 1214   Vitals   Pulse (!) 107 (!) 110 (!) 109   BP (!) 160/83 113/73 128/67   MAP (Calculated) 109 86 87   BP Location Right upper arm Right upper arm Right upper arm   BP Method Automatic Automatic Automatic   Patient Position Semi fowlers Sitting Sitting  (after LE & UE AROM)      04/18/24 1215 04/18/24 1218 04/18/24 1220   Vitals   Pulse (!) 110 (!) 112 (!) 111   BP (!) 96/55 (!) 107/59 (!) 150/81   MAP (Calculated) 69 75 104   BP Location Right upper arm Right upper arm Right Arm   BP Method Automatic Automatic Automatic   Patient Position Standing Sitting Sitting  (LEs elevated)     After treatment:   Patient left in no apparent distress sitting up in chair, Call bell within reach, Bed/ chair alarm activated, Updated patient's board on functional status and mobility recommendations, and RN aware      COMMUNICATION/EDUCATION:   The patient's plan of care was discussed with: occupational therapist and registered nurse    Patient Education  Education Given To: Patient  Education Provided: Role of Therapy;Plan of Care;Fall Prevention Strategies;Transfer Training;Orientation;Equipment;Home Exercise Program  Education Provided Comments: Use of RW for mobility, importance of OOB; reviewed LE HEP & wrote it on board  Education Method: Verbal;Demonstration  Barriers to Learning: Cognition  Education Outcome: Continued education needed;Verbalized understanding      Kaelyn Shields, PT  Minutes: 24

## 2024-04-18 NOTE — FLOWSHEET NOTE
Peritoneal Dialysis Disconnection / [phone]    Primary RN SBAR: Betzaida, RN  Patient Education provided: access/site care  Preferred Education method and Primary language: demonstration/English    Hepatitis B Surface Ag   Date/Time Value Ref Range Status   04/18/2024 01:19 AM <0.10 Index Final     Hep B S Ab   Date/Time Value Ref Range Status   04/16/2024 10:09 AM <3.10 mIU/mL Final       04/18/24 0357   Peritoneal Dialysis Catheter Mid lower abdomen   No placement date or time found.   Catheter Location: Mid lower abdomen   Status Deaccessed   Site Condition Clean, dry, intact   Dressing Status Clean, dry & intact   Dressing Gauze   Date of Last Dressing Change 04/17/24   Dialysis Type Continuous cycling   Exit Site Condition excellent   Catheter Care Given Yes   Post-Treatment (Cycler)   Average Dwell Time (Hours:Minutes) 1:23   Lost Dwell Time (Hours:Minutes) 0:26   Effluent Appearance Clear;Yellow   Volume Gain (mL) 167 mL     Miguelangel RN at bedside to disconnect pt from CCPD tx. Orders, consent, pt, and code status confirmed. Tx completed. Used cassette and bags discarded. Education and pre/post report given to primary RN.    Miguelangel RN arrived to room with cycler alarming 'low UF.'  Pt repositioned multiple times and drain resumed.    Net fluid gain: 167 ml

## 2024-04-18 NOTE — FLOWSHEET NOTE
CCPD Connection: 04/18/24 1529   Vitals   BP (!) 161/77   Temp 98 °F (36.7 °C)   Pulse (!) 112   Respirations 18   SpO2 98 %   Observations & Evaluations   Level of Consciousness 0   Oriented X 4   Heart Rhythm Regular   Respiratory Quality/Effort Unlabored   O2 Device None (Room air)   Bilateral Breath Sounds Clear   Skin Condition/Temp Warm;Dry   Abdomen Inspection Soft   Edema None   Peritoneal Dialysis Catheter Mid lower abdomen   No placement date or time found.   Catheter Location: Mid lower abdomen   Status Accessed   Site Condition Clean, dry, intact   Dressing Status New dressing applied   Dressing Gauze   Date of Last Dressing Change 04/18/24   Dialysis Type Continuous cycling   Exit Site Condition excellent   Catheter Care Given   (two minute alcavis scrub/soak)   Cycler   Verification of Prescription CCPD   Informed Consent  Yes  (chronic consent applies)   Total Volume Programmed 18926 mL   Therapy Time (Hours:Minutes) 10:00   Cycler Type Jung HomeChoice   Fill Volume 2500 mL   Last Fill Volume 0 mL   Dextrose Setting Same (Nonextraneal)   I Drain Alarm 0 mL   Number of Cycles 5   Bag Volume 6000 mL   Number of Bags Used 3   Dianeal Solution   ((bag 1 and 3 ) 2.5% (bag 2) 1.5%)     Hepatitis B Surface Ag   Date/Time Value Ref Range Status   04/18/2024 01:19 AM <0.10 Index Final     Hep B S Ab   Date/Time Value Ref Range Status   04/16/2024 10:09 AM <3.10 mIU/mL Final     Time Out (time): 1520  Primary RN SBAR: YVON Anders, RN  Patient Education: Infection prevention

## 2024-04-18 NOTE — PLAN OF CARE
Problem: Occupational Therapy - Adult  Goal: By Discharge: Performs self-care activities at highest level of function for planned discharge setting.  See evaluation for individualized goals.  Description: FUNCTIONAL STATUS PRIOR TO ADMISSION:  Patient was ambulatory using RW   , ADL Assistance: Needs assistance, Bath: Supervision, Dressing: Minimal assistance,  ,  ,  , Homemaking Assistance: Needs assistance, Ambulation Assistance: Independent (with rollator), Transfer Assistance: Needs assistance (sometimes needs assist for bed mobility & transfers but not all of the time), Active : No     HOME SUPPORT: Patient lived son who assisted with ADLS/IADLS.    Occupational Therapy Goals:  Initiated 4/16/2024  1.  Patient will perform self-feeding with Set-up within 7 day(s).  2.  Patient will perform grooming with Minimal Assist within 7 day(s).  3.  Patient will perform bathing with Minimal Assist within 7 day(s).  4.  Patient will perform toilet transfers with Minimal Assist  within 7 day(s).  5.  Patient will perform all aspects of toileting with Minimal Assist within 7 day(s).  6.  Patient will participate in upper extremity therapeutic exercise/activities with Minimal Assist for 5 minutes within 7 day(s).    7.  Patient will utilize energy conservation techniques during functional activities with verbal cues within 7 day(s).   Outcome: Progressing   OCCUPATIONAL THERAPY TREATMENT  Patient: Galilea Aguilar (68 y.o. female)  Date: 4/18/2024  Primary Diagnosis: Hyperkalemia [E87.5]  Lactic acidosis [E87.20]  ESRD on peritoneal dialysis (HCC) [N18.6, Z99.2]  Altered mental status, unspecified altered mental status type [R41.82]  AMS (altered mental status) [R41.82]       Precautions: Fall Risk                Chart, occupational therapy assessment, plan of care, and goals were reviewed.    ASSESSMENT  Patient continues to benefit from skilled OT services and is progressing towards goals. Pt received semi-reclined in  bed, agreeable to therapy, cleared by RN. Clearer mentation this session comparative to yesterdays. Pt was Min A for bed mobility, heavily relying on use of handrails for trunk elevation into sitting. Fair sitting balance. BP dropped when in sitting but pt stated dizziness resolved after sustained sitting. Min x2 for sit<>stand with additional decrease in BP, pt able to take 2-3 steps to chair with BP recovering once in sitting. Pt educated on BUE exercises to complete in chair, and was able to complete self care sitting. Pt set up for lunch at end of session with all needs met,call bell in reach. Will continue to follow in acute setting.             PLAN :  Patient continues to benefit from skilled intervention to address the above impairments.  Continue treatment per established plan of care to address goals.    Recommend with staff: oob to chair for all meals; x1 assist to BSC    Recommend next OT session: standing grooming tolerance    Recommendation for discharge: (in order for the patient to meet his/her long term goals): Therapy up to 5 days/week in Skilled nursing facility    Other factors to consider for discharge: patient's current support system is unable to meet their requirements for physical assistance, poor safety awareness, impaired cognition, high risk for falls, not safe to be alone, and concern for safely navigating or managing the home environment    IF patient discharges home will need the following DME: continuing to assess with progress       SUBJECTIVE:   Patient stated “you guys are leaving me already.”    OBJECTIVE DATA SUMMARY:   Cognitive/Behavioral Status:  Orientation  Overall Orientation Status: Impaired  Orientation Level: Oriented X4  Cognition  Overall Cognitive Status: Exceptions  Arousal/Alertness: Appropriate responses to stimuli  Following Commands: Follows one step commands consistently;Follows multistep commands with repitition;Follows multistep commands with increased

## 2024-04-18 NOTE — PROGRESS NOTES
Hospitalist Progress Note  DAYA Mehta - NP  Answering service: [phone]        Date of Service:  2024  NAME:  Galilea Aguilar  :  1955  MRN:  [id number]    Admission Summary:     Ms. Aguilar is a 68-year-old female with a past medical history of ESRD (on PD), anemia, CAD (status post CABG), CVA, DM, COPD, hypertension, hyperlipidemia who presented with a 1 day history of altered mental status.  Patient's daughter reported patient had confusion and disorientation with an episode of increased sleepiness and seeming to \"nod off\" + reported vomiting and diarrhea.  In the ED, patient appeared to be back to her baseline.  And reportedly doing her PD without issues.    Interval history / Subjective:        Patient was seen and examined this afternoon, she was lying in bed in no acute distress though reports that she is having some mid upper left back pain.  She reports the pain is chronic, has had for many weeks prior to admission.  She feels as though it happened after she had a fall and landed on boxes of soda.  She has had Tylenol and Motrin without much relief, will give low-dose x 1 of oxycodone.  Concerned about hallucinations as she was having these yesterday where she thought she saw bugs crawling all over her bed and tray.  She reports she has not seen any of this today.    Assessment & Plan:     Altered Mental Status  - chest Xray with no acute process  - CT head no acute intracranial process  - UA with no evidence of infection  - no leukocytosis, no fevers.    - Oriented with no reports of hallucinations today     Orthostatic hypotension  - Has received IV fluids  - Holding Coreg  - Recommended VANESSA hose   - Discussed with nephrology, they will be adjusting her dialysis late to pull less fluid    Lactic Acidosis  - no s/s infection  - on admit, pt was hypotensive and no temp was able to be

## 2024-04-18 NOTE — PROGRESS NOTES
[address], [place] [address]  Phone: [phone]   [phone]    [email or web address]     Nephrology Progress Note    Patient Name : Galilea Aguilar      : 1955     MRN : [id number]  Date of Admission : 2024  Date of Servive : 24    CC:  Follow up for ESRD       Assessment and Plan   ESRD on PD:  - Dr. Chiu pt, home Rx: 4 exchanges, 2.3L, LF of extraneal 1L, total time 9 hrs  -  PD cell count (-) culture (-)  - continue CCPD, 5 exchanges, 10 hrs, 2.5L, will alternate 2.5% with 1.5% solution  - renally dose medications  - daily labs    HyperK:  - resolved   - monitor need for K supplements with PD    AMS:  - CT head neg  - CT abdomen:  Cholelithiasis without evidence of cholecystitis.. Hepatic parenchymal disease suspicious for cirrhosis. fatty growth in the right retroperitoneum   - Ammonia level 38  - 417 BC NGTD  - CXR clear    Hx of peritonitis:  - PD cell count (-)    Anemia of CKD:  - hgb 9.5  - continue with LIS    Secondary HPT    DM2  HTN  COPD  CAD s/p CABG 23  Hx of CVA     Interval History:  Seen and examined this AM.Low UF this AM repositioned with improved drain  added 1.5% solution with improved BP (+) 167 ml - still having orthostatic hypotension per vitals. No complaints this AM    Review of Systems: Pertinent items are noted in HPI.    Current Medications:   Current Facility-Administered Medications   Medication Dose Route Frequency    aspirin chewable tablet 81 mg  81 mg Oral Daily    [Held by provider] carvedilol (COREG) tablet 12.5 mg  12.5 mg Oral BID WC    arformoterol 15 mcg-budesonide 0.25 mg neb solution   Nebulization BID RT    montelukast (SINGULAIR) tablet 10 mg  10 mg Oral Nightly    epoetin mohan (EPOGEN;PROCRIT) injection 4,000 Units  4,000 Units SubCUTAneous Once per day on     dianeal lo-brandi 1.5% 6,000 mL  6,000 mL IntraPERitoneal Daily    erythromycin (ROMYCIN) ophthalmic

## 2024-04-19 NOTE — PLAN OF CARE
Problem: Physical Therapy - Adult  Goal: By Discharge: Performs mobility at highest level of function for planned discharge setting.  See evaluation for individualized goals.  Description: FUNCTIONAL STATUS PRIOR TO ADMISSION: Patient reports she was able to ambulate around her home with a rollator independently, but occasionally needs help for bed mobility from family. She completes bathing with supervision but Min A for transfers onto shower chair and needs some assist for LB dressing.     HOME SUPPORT PRIOR TO ADMISSION: The patient lived with her son who provides some assistance to her.    Physical Therapy Goals  Initiated 4/16/2024  1.  Patient will move from supine to sit and sit to supine, scoot up and down, and roll side to side in bed with contact guard assist consistently within 7 day(s).    2.  Patient will perform sit to stand with modified independence within 7 day(s).  3.  Patient will transfer from bed to chair and chair to bed with modified independence using the least restrictive device within 7 day(s).  4.  Patient will ambulate with modified independence for 50 feet with the least restrictive device within 7 day(s).   5.  Patient will ascend/descend 4 stairs with handrail(s) with contact guard assist within 7 day(s).    Outcome: Progressing   PHYSICAL THERAPY TREATMENT    Patient: Galilea Aguilar (68 y.o. female)  Date: 4/19/2024  Diagnosis: Hyperkalemia [E87.5]  Lactic acidosis [E87.20]  ESRD on peritoneal dialysis (HCC) [N18.6, Z99.2]  Altered mental status, unspecified altered mental status type [R41.82]  AMS (altered mental status) [R41.82] AMS (altered mental status)      Precautions: Fall Risk                      ASSESSMENT:  Patient continues to benefit from skilled PT services and is progressing towards goals. Galilea tolerated today's session fairly. She continues to be limited by orthostatic hypotension, preventing progression of ambulation/standing activity. She required Min A for bed  mobility and CGA for transfers. While seated EOB her SBP was in 110s and in standing SBP in 80s but she was not symptomatic. After transfer to the chair her SBP recovered to 110s. RN notified of vitals and patient left up in chair with chair alarm on at end of session.       PLAN:  Patient continues to benefit from skilled intervention to address the above impairments.  Continue treatment per established plan of care.    Recommend with staff: Min A for bed <> chair with RW    Recommend next PT session:  Progress gait as tolerated (if not orthostatic)    Recommendation for discharge: (in order for the patient to meet his/her long term goals): Therapy up to 5 days/week in Skilled nursing facility vs HHPT & increased assistance from family (requires Min A for bed mobility & transfers)    Other factors to consider for discharge: poor safety awareness, impaired cognition, high risk for falls, not safe to be alone, and concern for safely navigating or managing the home environment    IF patient discharges home will need the following DME: continuing to assess with progress       SUBJECTIVE:   Patient stated, \"I was up in the chair for a while yesterday.\"    OBJECTIVE DATA SUMMARY:   Critical Behavior:  Orientation  Overall Orientation Status: Within Normal Limits  Orientation Level: Oriented X4  Cognition  Overall Cognitive Status: Exceptions  Arousal/Alertness: Appropriate responses to stimuli  Following Commands: Follows one step commands consistently;Follows multistep commands with increased time  Attention Span: Attends with cues to redirect  Safety Judgement: Decreased awareness of need for assistance;Decreased awareness of need for safety  Problem Solving: Decreased awareness of errors;Assistance required to generate solutions;Assistance required to implement solutions;Assistance required to correct errors made;Assistance required to identify errors made  Insights: Decreased awareness of deficits  Initiation:

## 2024-04-19 NOTE — HOME HEALTH
Visit missed due to arrived at patient home and no answer to the door.called daughter penny told patient went to er this morning with confusion and was at [facility]

## 2024-04-19 NOTE — PROGRESS NOTES
Physician Progress Note      PATIENT:               LLUY WIGGINS  [facility] #:                  [id number]  :                       1955  ADMIT DATE:       2024 2:42 AM  DISCH DATE:  RESPONDING  PROVIDER #:        Marybeth Castellon NP          QUERY TEXT:    Pt noted to have AMS. If possible, please document in progress notes and   discharge summary the relationship, if any, between AMS and any of the   following.      The medical record reflects the following:  Risk Factors: ESRD, Lactic Acidosis, Orthostatic Hypotension, Dehydration    Clinical Indicators:  Presented to ED w/ confusion and disorientation, increased sleepiness & n/v/d    BP 87/60 on admission.    Supine BP: 144/109  Sittin/65  Standin/51    Hosp -  Feels as though she sees bugs crawling all over the place and thought her   daughter was present in the room.        Treatment: IVF, adjust BP meds, hold Coreg, Neph consult for ESRD on PD, Neph   adjusting her dialysis to pull less fluid      Thank you,  MARIELA Magallanes,RN  Clinical Documentation  [email or web address]  Ph: [phone]  or via [employer]  Options provided:  -- AMS due to Orthostatic Hypotension  -- AMS due to Lactic Acidosis  -- AMS due to Dehydration  -- AMS due to ESRD  -- AMS due to ##please specify cause, please specify cause.  -- Other - I will add my own diagnosis  -- Disagree - Not applicable / Not valid  -- Disagree - Clinically unable to determine / Unknown  -- Refer to Clinical Documentation Reviewer    PROVIDER RESPONSE TEXT:    It is unclear at this time what the brief episode of AMS was due to.    Query created by: Isabel Beard on 2024 11:36 AM      Electronically signed by:  Marybeth Castellon NP 2024 1:44 PM

## 2024-04-19 NOTE — PLAN OF CARE
Problem: Occupational Therapy - Adult  Goal: By Discharge: Performs self-care activities at highest level of function for planned discharge setting.  See evaluation for individualized goals.  Description: FUNCTIONAL STATUS PRIOR TO ADMISSION:  Patient was ambulatory using RW   , ADL Assistance: Needs assistance, Bath: Supervision, Dressing: Minimal assistance,  ,  ,  , Homemaking Assistance: Needs assistance, Ambulation Assistance: Independent (with rollator), Transfer Assistance: Needs assistance (sometimes needs assist for bed mobility & transfers but not all of the time), Active : No     HOME SUPPORT: Patient lived son who assisted with ADLS/IADLS.    Occupational Therapy Goals:  Initiated 4/16/2024  1.  Patient will perform self-feeding with Set-up within 7 day(s).  2.  Patient will perform grooming with Minimal Assist within 7 day(s).  3.  Patient will perform bathing with Minimal Assist within 7 day(s).  4.  Patient will perform toilet transfers with Minimal Assist  within 7 day(s).  5.  Patient will perform all aspects of toileting with Minimal Assist within 7 day(s).  6.  Patient will participate in upper extremity therapeutic exercise/activities with Minimal Assist for 5 minutes within 7 day(s).    7.  Patient will utilize energy conservation techniques during functional activities with verbal cues within 7 day(s).     Outcome: Progressing    OCCUPATIONAL THERAPY TREATMENT  Patient: Galilea Aguilar (68 y.o. female)  Date: 4/19/2024  Primary Diagnosis: Hyperkalemia [E87.5]  Lactic acidosis [E87.20]  ESRD on peritoneal dialysis (HCC) [N18.6, Z99.2]  Altered mental status, unspecified altered mental status type [R41.82]  AMS (altered mental status) [R41.82]       Precautions: Fall Risk                Chart, occupational therapy assessment, plan of care, and goals were reviewed.    ASSESSMENT  Patient continues to benefit from skilled OT services and is slowly progressing towards goals. Patient ADLs

## 2024-04-19 NOTE — FLOWSHEET NOTE
[facility] [phone]    Primary RN SBAR: Claire, RN  Patient Education provided: access/site care  Preferred Education method and Primary language: demonstration/English    Hepatitis B Surface Ag   Date/Time Value Ref Range Status   04/18/2024 01:19 AM <0.10 Index Final     Hep B S Ab   Date/Time Value Ref Range Status   04/16/2024 10:09 AM <3.10 mIU/mL Final       04/19/24 0219   Peritoneal Dialysis Catheter Mid lower abdomen   No placement date or time found.   Catheter Location: Mid lower abdomen   Status Deaccessed   Site Condition Clean, dry, intact   Dressing Status Clean, dry & intact   Dressing Gauze   Date of Last Dressing Change 04/18/24   Dialysis Type Continuous cycling   Exit Site Condition excellent   Catheter Care Given Yes   Post-Treatment (Cycler)   Average Dwell Time (Hours:Minutes) 1:28   Lost Dwell Time (Hours:Minutes) 0:01   Effluent Appearance Clear;Yellow   PD Output (mL) 1 mL  (ID 42 ml + TUF (-41ml ) = 1 ml)     Miguelangel RN at bedside to disconnect pt from CCPD tx. Orders, consent, pt, and code status confirmed. Tx completed. Used cassette and bags discarded. Education and pre/post report given to primary RN.    Net fluid loss: 1 ml

## 2024-04-19 NOTE — CARE COORDINATION
Transition of Care Plan: anticipate d/c home with [facility];    HH Agency added to AVS    Pt receives OP Peritoneal Dialysis with supplies from [facility], CM will fax d/c summary to HD center    RUR: 39%  Prior Level of Functioning: Pt needed assistance with adls/bathing and iadls  Disposition: Home with [facility]  Follow up appointments: PCP & Specialist   DME needed: none  Transportation at discharge: Family  IM/IMM Medicare/ letter given: 4/19/24   Caregiver Contact: DaughterChaz [phone]  Discharge Caregiver contacted prior to discharge? yes  Care Conference needed? no  Barriers to discharge: none  -1015-CM reviewed pt chart and was informed by Attending of possible plan for d/c home today. CM informed Aliciapaulazeem of [facility] of plan for d/c and [facility] orders are in place. PEREZ spoke to pt's dtr, Chaz and she is agreeable to d/c plan.  Sumi Canela RN BSN CCM

## 2024-04-19 NOTE — PROGRESS NOTES
[address], [facility] [place] [address]  Phone: [phone]   [phone]    [email or web address]     Nephrology Progress Note    Patient Name : Galilea Aguilar      : 1955     MRN : [id number]  Date of Admission : 2024  Date of Servive : 24    CC:  Follow up for ESRD       Assessment and Plan   ESRD on PD:  - Dr. Chiu pt, home Rx: 4 exchanges, 2.3L, LF of extraneal 1L, total time 9 hrs  -  PD cell count (-) culture (-)  - continue CCPD, 5 exchanges, 10 hrs, 2.5L, will alternate 2.5% with 1.5% solution  - renally dose medications  - no labs for review.  - daily labs    HyperK:  - resolved   - monitor need for K supplements with PD    AMS:  - CT head neg  - CT abdomen:  Cholelithiasis without evidence of cholecystitis.. Hepatic parenchymal disease suspicious for cirrhosis. fatty growth in the right retroperitoneum   - Ammonia level 38  - 417 BC NGTD  - CXR clear    Hx of peritonitis:  - PD cell count (-)    Anemia of CKD:  - last hgb 9.5  - continue with LIS    Secondary HPT    DM2  HTN  COPD  CAD s/p CABG 23  Hx of CVA     Interval History:  Seen and examined this AM. No UF. No edema or SOB. Denies any hallucinations. BP improved.    Review of Systems: Pertinent items are noted in HPI.    Current Medications:   Current Facility-Administered Medications   Medication Dose Route Frequency    ibuprofen (ADVIL;MOTRIN) tablet 400 mg  400 mg Oral Q12H PRN    lidocaine 4 % external patch 1 patch  1 patch TransDERmal Daily    aspirin chewable tablet 81 mg  81 mg Oral Daily    [Held by provider] carvedilol (COREG) tablet 12.5 mg  12.5 mg Oral BID WC    arformoterol 15 mcg-budesonide 0.25 mg neb solution   Nebulization BID RT    montelukast (SINGULAIR) tablet 10 mg  10 mg Oral Nightly    epoetin mohan (EPOGEN;PROCRIT) injection 4,000 Units  4,000 Units SubCUTAneous Once per day on     dianeal lo-brandi 1.5% 6,000 mL  6,000 mL

## 2024-04-19 NOTE — PROGRESS NOTES
0800: Bedside and Verbal shift change report given to Catie RN (oncoming nurse) by Lelia RN (offgoing nurse). Report included the following information Nurse Handoff Report and Index.     1600: Bedside and Verbal shift change report given to Sandy RN (oncoming nurse) by Catie RN (offgoing nurse). Report included the following information Nurse Handoff Report and Index.

## 2024-04-19 NOTE — PLAN OF CARE
Problem: Safety - Adult  Goal: Free from fall injury  Outcome: Progressing  Flowsheets (Taken 4/19/2024 1600)  Free From Fall Injury: Instruct family/caregiver on patient safety     Problem: Discharge Planning  Goal: Discharge to home or other facility with appropriate resources  Outcome: Progressing  Flowsheets  Taken 4/19/2024 1600 by Sandy Anders RN  Discharge to home or other facility with appropriate resources: Identify barriers to discharge with patient and caregiver  Taken 4/19/2024 0800 by Catie Mccallum RN  Discharge to home or other facility with appropriate resources: Identify barriers to discharge with patient and caregiver     Problem: Skin/Tissue Integrity  Goal: Absence of new skin breakdown  Description: 1.  Monitor for areas of redness and/or skin breakdown  2.  Assess vascular access sites hourly  3.  Every 4-6 hours minimum:  Change oxygen saturation probe site  4.  Every 4-6 hours:  If on nasal continuous positive airway pressure, respiratory therapy assess nares and determine need for appliance change or resting period.  Outcome: Progressing     Problem: Pain  Goal: Verbalizes/displays adequate comfort level or baseline comfort level  Outcome: Progressing     Problem: Chronic Conditions and Co-morbidities  Goal: Patient's chronic conditions and co-morbidity symptoms are monitored and maintained or improved  Outcome: Progressing  Flowsheets  Taken 4/19/2024 1600 by Sandy Anders RN  Care Plan - Patient's Chronic Conditions and Co-Morbidity Symptoms are Monitored and Maintained or Improved: Monitor and assess patient's chronic conditions and comorbid symptoms for stability, deterioration, or improvement  Taken 4/19/2024 0800 by Catie Mccallum RN  Care Plan - Patient's Chronic Conditions and Co-Morbidity Symptoms are Monitored and Maintained or Improved: Monitor and assess patient's chronic conditions and comorbid symptoms for stability, deterioration, or improvement

## 2024-04-20 NOTE — PLAN OF CARE
Problem: Safety - Adult  Goal: Free from fall injury  Outcome: Progressing  Flowsheets (Taken 4/20/2024 0800)  Free From Fall Injury: Instruct family/caregiver on patient safety     Problem: Discharge Planning  Goal: Discharge to home or other facility with appropriate resources  Outcome: Progressing  Flowsheets (Taken 4/20/2024 0800)  Discharge to home or other facility with appropriate resources:   Identify barriers to discharge with patient and caregiver   Arrange for needed discharge resources and transportation as appropriate   Identify discharge learning needs (meds, wound care, etc)     Problem: Skin/Tissue Integrity  Goal: Absence of new skin breakdown  Description: 1.  Monitor for areas of redness and/or skin breakdown  2.  Assess vascular access sites hourly  3.  Every 4-6 hours minimum:  Change oxygen saturation probe site  4.  Every 4-6 hours:  If on nasal continuous positive airway pressure, respiratory therapy assess nares and determine need for appliance change or resting period.  Outcome: Progressing     Problem: Pain  Goal: Verbalizes/displays adequate comfort level or baseline comfort level  Outcome: Progressing  Flowsheets (Taken 4/20/2024 0700 by Malika Allen RN)  Verbalizes/displays adequate comfort level or baseline comfort level: Encourage patient to monitor pain and request assistance     Problem: Chronic Conditions and Co-morbidities  Goal: Patient's chronic conditions and co-morbidity symptoms are monitored and maintained or improved  Outcome: Progressing  Flowsheets (Taken 4/20/2024 0800)  Care Plan - Patient's Chronic Conditions and Co-Morbidity Symptoms are Monitored and Maintained or Improved:   Collaborate with multidisciplinary team to address chronic and comorbid conditions and prevent exacerbation or deterioration   Update acute care plan with appropriate goals if chronic or comorbid symptoms are exacerbated and prevent overall improvement and discharge   Monitor and assess

## 2024-04-20 NOTE — FLOWSHEET NOTE
04/20/24 1600   Vitals   BP (!) 156/74   Pulse (!) 105   Respirations 18   SpO2 99 %   Observations & Evaluations   Level of Consciousness 0   Oriented X 4   Heart Rhythm Regular   Respiratory Quality/Effort Unlabored   O2 Device None (Room air)   Bilateral Breath Sounds Clear   Skin Condition/Temp Warm;Dry   Edema None   Peritoneal Dialysis Catheter Mid lower abdomen   No placement date or time found.   Catheter Location: Mid lower abdomen   Status Accessed   Site Condition Clean, dry, intact   Dressing Status New dressing applied;Clean, dry & intact   Dressing Gauze   Date of Last Dressing Change 04/20/24   Dialysis Type Continuous cycling   Exit Site Condition excellent   Catheter Care Given Yes   Cycler   Verification of Prescription CCPD   Informed Consent  Yes   Total Volume Programmed 71879 mL   Therapy Time (Hours:Minutes) 10:00   Fill Volume 2500 mL   Last Fill Volume 0 mL   Dextrose Setting Same (Nonextraneal)   Number of Cycles 5   Bag Volume 6000 mL   Number of Bags Used 3   Dianeal Solution Other (Comment)  (2.5% Dextrose in 6000 ml (bags 1 and 3), 1.5% Dextrose in 6000 ml (bag 2))     Primary RN SBAR: Catie   Patient Education provided: hospital orders differ from home orders; speak with home PD nurse when she returns home to check for any changes needed.   Preferred Education method and Primary language: Verbal; English  Hospital associated wait time; reason: 15min; patient went to Xray during   Hepatitis B Surface Ag   Date/Time Value Ref Range Status   04/18/2024 01:19 AM <0.10 Index Final     Hep B S Ab   Date/Time Value Ref Range Status   04/16/2024 10:09 AM <3.10 mIU/mL Final    No issues with set up, connection, or dressing change.

## 2024-04-20 NOTE — FLOWSHEET NOTE
04/19/24 2000   Vitals   BP (!) 143/72   Temp 98.8 °F (37.1 °C)   Pulse (!) 104   Respirations 14   SpO2 100 %   Observations & Evaluations   Level of Consciousness 0   Heart Rhythm Regular  (Bedside Telemetry)   Respiratory Quality/Effort Unlabored   O2 Device None (Room air)   Skin Color Other (comment)  (Appropriate for ethnicity)   Skin Condition/Temp Warm;Dry   Abdomen Inspection Soft;Obese   Edema None   Peritoneal Dialysis Catheter Mid lower abdomen   No placement date or time found.   Catheter Location: Mid lower abdomen   Status Accessed   Site Condition Clean, dry, intact   Dressing Status Clean, dry & intact  (Exit site cleansed with Exsept. Gentamicin 1% ointment  applied and dressed with dry/sterile gauze.)   Dressing Gauze   Date of Last Dressing Change 04/19/24   Dialysis Type Continuous cycling   Exit Site Condition excellent   Catheter Care Given Yes  (Two minute Alcavis scrub/soak performed prior to connection.)   Cycler   Verification of Prescription CCPD   Informed Consent  Yes   Total Volume Programmed 83748 mL   Therapy Time (Hours:Minutes) 10:00   Cycler Type [facility]   Fill Volume 2500 mL   Last Fill Volume 0 mL   Dextrose Setting Same (Nonextraneal)   I Drain Alarm 0 mL   Number of Cycles 5   Bag Volume 6000 mL   Number of Bags Used 3   Dianeal Solution Other (Comment)  (2.5% Dextrose in 6000 ml (bags 1 and 3), 1.5% Dextrose in 6000 ml (bag 3))        04/19/24 2000   Technical Checks   ICEBOAT I;C;E;B;O;A;T     Primary RN SBAR: Malika Allen RN  Patient Education provided: Exit site care  Preferred Education method and Primary language: Explanation/English  Hospital associated wait time; reason: N/A    Comments:  New cassette primed and tested.  Sterile connection made and therapy initiated.  Remained with the patient for the initial drain and beginning of the first fill.  Patient left with the bed low, side rails up X 3, call bell and belongings in reach.      Hepatitis B

## 2024-04-20 NOTE — PROGRESS NOTES
Hospitalist Progress Note  DAYA Blankenship - NP  Answering service: [phone]        Date of Service:  2024  NAME:  Galilea Aguilar  :  1955  MRN:  [id number]    Admission Summary:   Ms. Aguilar is a 68-year-old female with a past medical history of ESRD (on PD), anemia, CAD (status post CABG), CVA, DM, COPD, hypertension, hyperlipidemia who presented with a 1 day history of altered mental status.  Patient's daughter reported patient had confusion and disorientation with an episode of increased sleepiness and seeming to \"nod off\" + reported vomiting and diarrhea.  In the ED, patient appeared to be back to her baseline.  And reportedly doing her PD without issues.    Interval history / Subjective:      Patient lying in bed in no acute distress.  States she still having dizziness with position changes this morning  Nursing to obtain orthostatic vitals, will adjust midodrine as needed  Patient complaining of left-sided lower back pain, had a fall few weeks ago. No tenderness on exam. Has lidocaine patch on. States tylenol only works for one hour. Pain is intermittent.  Obtain x-ray of thoracic and lumbar spine    Assessment & Plan:     Altered Mental Status (resolved)   Unclear etiology   - chest Xray with no acute process  - CT head no acute intracranial process  - UA with no evidence of infection  - no leukocytosis, no fevers.    - Oriented with no reports of hallucinations today     Orthostatic hypotension  - Has received IV fluids  - resume coreg at lower dose today as patient with ongoing tachycardia   - Recommended VANESSA hose   - Discussed with nephrology, they will be adjusting her dialysis late to pull less fluid  - added midodrine 5 mg TID     Lactic Acidosis  - no s/s infection  - on admit, pt was hypotensive and no temp was able to be obtained  - 3.3-4.6: appears they have been chronically high in  affect, sensory grossly within normal limit,AA&Ox3, WARD spontaneously  Skin: Warm, dry    Data Review:   Review and/or order of clinical lab test  Review and/or order of tests in the medicine section of CPT    I have independently reviewed and interpreted patient's lab and all other diagnostic data    Notes reviewed from all clinical/nonclinical/nursing services involved in patient's clinical care. Care coordination discussions were held with appropriate clinical/nonclinical/ nursing providers based on care coordination needs.     Labs:     Recent Labs     04/18/24  0119   WBC 7.1   HGB 9.5*   HCT 31.2*          Recent Labs     04/18/24  0119      K 3.6      CO2 24   BUN 12   MG 2.1   PHOS 2.4*       No results for input(s): \"ALT\", \"TP\", \"ALB\", \"GLOB\", \"GGT\", \"AML\" in the last 72 hours.    Invalid input(s): \"SGOT\", \"GPT\", \"AP\", \"TBIL\", \"TBILI\", \"AMYP\", \"LPSE\", \"HLPSE\"    No results for input(s): \"INR\", \"APTT\" in the last 72 hours.    Invalid input(s): \"PTP\"   No results for input(s): \"TIBC\", \"FERR\" in the last 72 hours.    Invalid input(s): \"FE\", \"PSAT\"   No results found for: \"FOL\", \"RBCF\"   No results for input(s): \"PH\", \"PCO2\", \"PO2\" in the last 72 hours.  No results for input(s): \"CPK\" in the last 72 hours.    Invalid input(s): \"CPKMB\", \"CKNDX\", \"TROIQ\"  Lab Results   Component Value Date/Time    CHOL 123 12/14/2023 06:22 AM    HDL 38 12/14/2023 06:22 AM     Lab Results   Component Value Date/Time    GLUCPOC 159 01/09/2023 03:20 PM    GLUCPOC 166 01/09/2023 03:05 PM     Medications Reviewed:     Current Facility-Administered Medications   Medication Dose Route Frequency    carvedilol (COREG) tablet 6.25 mg  6.25 mg Oral BID WC    midodrine (PROAMATINE) tablet 5 mg  5 mg Oral TID WC    ibuprofen (ADVIL;MOTRIN) tablet 400 mg  400 mg Oral Q12H PRN    lidocaine 4 % external patch 1 patch  1 patch TransDERmal Daily    aspirin chewable tablet 81 mg  81 mg Oral Daily    arformoterol 15 mcg-budesonide

## 2024-04-20 NOTE — PROGRESS NOTES
Hospitalist Progress Note  DAYA Juarez - NP  Answering service: [phone]        Date of Service:  2024  NAME:  Galilea Aguilar  :  1955  MRN:  [id number]    Admission Summary:     Ms. Aguilar is a 68-year-old female with a past medical history of ESRD (on PD), anemia, CAD (status post CABG), CVA, DM, COPD, hypertension, hyperlipidemia who presented with a 1 day history of altered mental status.  Patient's daughter reported patient had confusion and disorientation with an episode of increased sleepiness and seeming to \"nod off\" + reported vomiting and diarrhea.  In the ED, patient appeared to be back to her baseline.  And reportedly doing her PD without issues.    Interval history / Subjective:        Patient was seen and examined this morning. She has had no further hallucinations and she very clearly remembers the incident where she reports seeing cockroaches everywhere. She recognizes that it was visual hallucinations.     She is still having severe symptomatic orthostatic hypotension with position changes --bc of this she is still requiring minimal assistance bed mobility and transfers. Today, SBP while sitting was 110 and then dropped to 80s systolic with standing.     Assessment & Plan:     Altered Mental Status (resolved)   Unclear etiology   - chest Xray with no acute process  - CT head no acute intracranial process  - UA with no evidence of infection  - no leukocytosis, no fevers.    - Oriented with no reports of hallucinations today     Orthostatic hypotension  - Has received IV fluids  - resume coreg at lower dose today as patient with ongoing tachycardia   - Recommended VANESSA hose   - Discussed with nephrology, they will be adjusting her dialysis late to pull less fluid  - added midodrine 5 mg TID     Lactic Acidosis  - no s/s infection  - on admit, pt was hypotensive and no temp was able

## 2024-04-20 NOTE — FLOWSHEET NOTE
04/20/24 0830   Vitals   /76   Temp 98.5 °F (36.9 °C)   Temp Source Oral   Pulse (!) 114   Respirations 21   Observations & Evaluations   Level of Consciousness 0   Oriented X 4   Heart Rhythm Regular   Respiratory Quality/Effort Unlabored   O2 Device None (Room air)   Bilateral Breath Sounds Clear   Skin Condition/Temp Warm;Dry   Edema None   Peritoneal Dialysis Catheter Mid lower abdomen   No placement date or time found.   Catheter Location: Mid lower abdomen   Status Deaccessed   Dressing Status Clean, dry & intact   Dressing Gauze   Date of Last Dressing Change 04/19/24   Dialysis Type Continuous cycling   Catheter Care Given Yes   Post-Treatment (Cycler)   Average Dwell Time (Hours:Minutes) 1:25   Lost Dwell Time (Hours:Minutes) 0:15   Effluent Appearance Clear;Yellow   PD Output (mL) 741 mL  (I drain 141ml + Total UF 600ml = 741ml)     Primary RN SBAR: Catie Erazo, JANES  Comments: Arrived at 8am to Low UF alarm and patient sitting up eating breakfast. Alarm easily bypassed. Drain 5 of 5 was continued. Treatment ended at 0820. No other complications.  Cassette and bags drained and disposed of in red biohazard bin.

## 2024-04-21 NOTE — FLOWSHEET NOTE
04/21/24 1500   Vitals   BP (!) 138/50   Pulse (!) 113   Respirations 25   SpO2 99 %   Observations & Evaluations   Level of Consciousness 0   Oriented X 4   Heart Rhythm   (bedside tele)   Respiratory Quality/Effort Unlabored   O2 Device None (Room air)   Bilateral Breath Sounds Clear   Skin Condition/Temp Warm;Dry   Abdomen Inspection Soft   Edema None   Peritoneal Dialysis Catheter Mid lower abdomen   No placement date or time found.   Catheter Location: Mid lower abdomen   Status Accessed   Site Condition Clean, dry, intact   Dressing Status New dressing applied;Clean, dry & intact   Dressing Gauze   Date of Last Dressing Change 04/21/24   Dialysis Type Continuous cycling   Exit Site Condition excellent   Catheter Care Given Yes   Cycler   Verification of Prescription CCPD   Informed Consent  Yes  (chronic consent applies)   Total Volume Programmed 00645 mL   Therapy Time (Hours:Minutes) 10:00   Cycler Type [facility]   Fill Volume 2500 mL   Last Fill Volume 0 mL   Dextrose Setting Same (Nonextraneal)   I Drain Alarm 0 mL   Number of Cycles 5   Bag Volume 6000 mL   Number of Bags Used 3   Dianeal Solution Other (Comment)  ((bag 1 and 3) 2.5% (bag 2) 1.5%))   Time Out (time): 1500  Primary RN SBAR: Jose Elias RN  Patient Education: infection prevention  Comments: orders, labs, consent chronic consent conveys and code status reviewed. PD initiated as ordered. SBAR with primary nurse.   Hepatitis B Surface Ag   Date/Time Value Ref Range Status   04/18/2024 01:19 AM <0.10 Index Final     Hep B S Ab   Date/Time Value Ref Range Status   04/16/2024 10:09 AM <3.10 mIU/mL Final       Start time 1550  Est End time 0150

## 2024-04-21 NOTE — PROGRESS NOTES
medicine section of CPT    I have independently reviewed and interpreted patient's lab and all other diagnostic data    Notes reviewed from all clinical/nonclinical/nursing services involved in patient's clinical care. Care coordination discussions were held with appropriate clinical/nonclinical/ nursing providers based on care coordination needs.     Labs:     Recent Labs     04/21/24  0614   WBC 8.2   HGB 8.9*   HCT 28.2*          Recent Labs     04/21/24  0614      K 3.4*      CO2 27   BUN 15   MG 1.8   PHOS 2.8       No results for input(s): \"ALT\", \"TP\", \"ALB\", \"GLOB\", \"GGT\", \"AML\" in the last 72 hours.    Invalid input(s): \"SGOT\", \"GPT\", \"AP\", \"TBIL\", \"TBILI\", \"AMYP\", \"LPSE\", \"HLPSE\"    No results for input(s): \"INR\", \"APTT\" in the last 72 hours.    Invalid input(s): \"PTP\"   No results for input(s): \"TIBC\", \"FERR\" in the last 72 hours.    Invalid input(s): \"FE\", \"PSAT\"   No results found for: \"FOL\", \"RBCF\"   No results for input(s): \"PH\", \"PCO2\", \"PO2\" in the last 72 hours.  No results for input(s): \"CPK\" in the last 72 hours.    Invalid input(s): \"CPKMB\", \"CKNDX\", \"TROIQ\"  Lab Results   Component Value Date/Time    CHOL 123 12/14/2023 06:22 AM    HDL 38 12/14/2023 06:22 AM     Lab Results   Component Value Date/Time    GLUCPOC 159 01/09/2023 03:20 PM    GLUCPOC 166 01/09/2023 03:05 PM     Medications Reviewed:     Current Facility-Administered Medications   Medication Dose Route Frequency    midodrine (PROAMATINE) tablet 10 mg  10 mg Oral TID WC    0.9 % sodium chloride infusion   IntraVENous Continuous    pantoprazole (PROTONIX) 40 mg in sodium chloride (PF) 0.9 % 10 mL injection  40 mg IntraVENous Daily    calcium carbonate (TUMS) chewable tablet 500 mg  500 mg Oral TID PRN    carvedilol (COREG) tablet 6.25 mg  6.25 mg Oral BID WC    ibuprofen (ADVIL;MOTRIN) tablet 400 mg  400 mg Oral Q12H PRN    lidocaine 4 % external patch 1 patch  1 patch TransDERmal Daily    aspirin chewable tablet 81  mg  81 mg Oral Daily    arformoterol 15 mcg-budesonide 0.25 mg neb solution   Nebulization BID RT    montelukast (SINGULAIR) tablet 10 mg  10 mg Oral Nightly    epoetin mohan (EPOGEN;PROCRIT) injection 4,000 Units  4,000 Units SubCUTAneous Once per day on Mon Wed Fri    dianeal lo-brandi 1.5% 6,000 mL  6,000 mL IntraPERitoneal Daily    erythromycin (ROMYCIN) ophthalmic ointment   Right Eye 4 times per day    sodium chloride flush 0.9 % injection 5-40 mL  5-40 mL IntraVENous 2 times per day    sodium chloride flush 0.9 % injection 5-40 mL  5-40 mL IntraVENous PRN    0.9 % sodium chloride infusion   IntraVENous PRN    ondansetron (ZOFRAN-ODT) disintegrating tablet 4 mg  4 mg Oral Q8H PRN    Or    ondansetron (ZOFRAN) injection 4 mg  4 mg IntraVENous Q6H PRN    polyethylene glycol (GLYCOLAX) packet 17 g  17 g Oral Daily PRN    acetaminophen (TYLENOL) tablet 650 mg  650 mg Oral Q6H PRN    Or    acetaminophen (TYLENOL) suppository 650 mg  650 mg Rectal Q6H PRN    heparin (porcine) injection 5,000 Units  5,000 Units SubCUTAneous 3 times per day    dianeal lo-brandi (ULTRABAG) 2.5% 6,000 mL  6,000 mL IntraPERitoneal Daily    dianeal lo-brandi (ULTRABAG) 2.5% 6,000 mL  6,000 mL IntraPERitoneal Daily    [Held by provider] 0.9 % sodium chloride infusion   IntraVENous Continuous    gentamicin (GARAMYCIN) 0.1 % cream   Topical PRN    pramox-PE-glycerin-petrolatum cream   Rectal BID PRN    melatonin tablet 3 mg  3 mg Oral Nightly PRN   ______________________________________________________________________  EXPECTED LENGTH OF STAY: 3  ACTUAL LENGTH OF STAY:          5               DAYA Blankenship NP

## 2024-04-21 NOTE — PLAN OF CARE
Problem: Safety - Adult  Goal: Free from fall injury  Outcome: Progressing  Flowsheets  Taken 4/21/2024 0800 by Catie Erazo RN  Free From Fall Injury: Instruct family/caregiver on patient safety  Taken 4/20/2024 2000 by Malika Allen RN  Free From Fall Injury: Instruct family/caregiver on patient safety     Problem: Discharge Planning  Goal: Discharge to home or other facility with appropriate resources  Outcome: Progressing  Flowsheets  Taken 4/21/2024 0800 by Catie Erazo RN  Discharge to home or other facility with appropriate resources:   Identify barriers to discharge with patient and caregiver   Arrange for needed discharge resources and transportation as appropriate   Identify discharge learning needs (meds, wound care, etc)  Taken 4/20/2024 2000 by Malika Allen RN  Discharge to home or other facility with appropriate resources: Identify barriers to discharge with patient and caregiver     Problem: Skin/Tissue Integrity  Goal: Absence of new skin breakdown  Description: 1.  Monitor for areas of redness and/or skin breakdown  2.  Assess vascular access sites hourly  3.  Every 4-6 hours minimum:  Change oxygen saturation probe site  4.  Every 4-6 hours:  If on nasal continuous positive airway pressure, respiratory therapy assess nares and determine need for appliance change or resting period.  Outcome: Progressing     Problem: Pain  Goal: Verbalizes/displays adequate comfort level or baseline comfort level  Outcome: Progressing     Problem: Chronic Conditions and Co-morbidities  Goal: Patient's chronic conditions and co-morbidity symptoms are monitored and maintained or improved  Outcome: Progressing  Flowsheets  Taken 4/21/2024 0800 by Catie Erazo RN  Care Plan - Patient's Chronic Conditions and Co-Morbidity Symptoms are Monitored and Maintained or Improved:   Monitor and assess patient's chronic conditions and comorbid symptoms for stability, deterioration, or improvement

## 2024-04-22 NOTE — PLAN OF CARE
Problem: Occupational Therapy - Adult  Goal: By Discharge: Performs self-care activities at highest level of function for planned discharge setting.  See evaluation for individualized goals.  Description: FUNCTIONAL STATUS PRIOR TO ADMISSION:  Patient was ambulatory using RW   , ADL Assistance: Needs assistance, Bath: Supervision, Dressing: Minimal assistance,  ,  ,  , Homemaking Assistance: Needs assistance, Ambulation Assistance: Independent (with rollator), Transfer Assistance: Needs assistance (sometimes needs assist for bed mobility & transfers but not all of the time), Active : No     HOME SUPPORT: Patient lived son who assisted with ADLS/IADLS.    Occupational Therapy Goals:  Initiated 4/16/2024  1.  Patient will perform self-feeding with Set-up within 7 day(s).  2.  Patient will perform grooming with Minimal Assist within 7 day(s).  3.  Patient will perform bathing with Minimal Assist within 7 day(s).  4.  Patient will perform toilet transfers with Minimal Assist  within 7 day(s).  5.  Patient will perform all aspects of toileting with Minimal Assist within 7 day(s).  6.  Patient will participate in upper extremity therapeutic exercise/activities with Minimal Assist for 5 minutes within 7 day(s).    7.  Patient will utilize energy conservation techniques during functional activities with verbal cues within 7 day(s).   Outcome: Progressing   OCCUPATIONAL THERAPY TREATMENT  Patient: Galilea Aguilar (68 y.o. female)  Date: 4/22/2024  Primary Diagnosis: Hyperkalemia [E87.5]  Lactic acidosis [E87.20]  ESRD on peritoneal dialysis (HCC) [N18.6, Z99.2]  Altered mental status, unspecified altered mental status type [R41.82]  AMS (altered mental status) [R41.82]       Precautions: Fall Risk                Chart, occupational therapy assessment, plan of care, and goals were reviewed.    ASSESSMENT  Patient continues to benefit from skilled OT services and is progressing towards goals. Patient received supine in

## 2024-04-22 NOTE — FLOWSHEET NOTE
CCPD Disconnection:   04/22/24 0310   Observations & Evaluations   Level of Consciousness 0   Heart Rhythm Regular   Respiratory Quality/Effort Unlabored   O2 Device None (Room air)   Skin Condition/Temp Dry;Warm   Abdomen Inspection Soft   Edema None   Peritoneal Dialysis Catheter Mid lower abdomen   No placement date or time found.   Catheter Location: Mid lower abdomen   Status Deaccessed   Site Condition Clean, dry, intact   Dressing Status Clean, dry & intact   Dressing Gauze   Date of Last Dressing Change 04/21/24   Dialysis Type Continuous cycling   Catheter Care Given Yes   Post-Treatment (Cycler)   Average Dwell Time (Hours:Minutes) 1:29   Lost Dwell Time (Hours:Minutes) 0:03  (Added Dwell)   Effluent Appearance Clear;Yellow   PD Output (mL) 838 mL  (Initial Drain (42 ml) + Total UF (796 ml) - Last Fill (0) = 838 ml)     Primary RN SBAR: GIL Allen, RN  Comments: Prior to disconnection one minute Alcavis scrub & soak performed. Sterile mini cap applied & transfer set secured to pt abdomen with tape. Old cassette & bags discarded in red biohazard bag.

## 2024-04-22 NOTE — PLAN OF CARE
Problem: Safety - Adult  Goal: Free from fall injury  Outcome: Progressing  Flowsheets (Taken 4/22/2024 0800)  Free From Fall Injury: Instruct family/caregiver on patient safety     Problem: Discharge Planning  Goal: Discharge to home or other facility with appropriate resources  Outcome: Progressing  Flowsheets (Taken 4/22/2024 0800)  Discharge to home or other facility with appropriate resources: Identify barriers to discharge with patient and caregiver     Problem: Skin/Tissue Integrity  Goal: Absence of new skin breakdown  Description: 1.  Monitor for areas of redness and/or skin breakdown  2.  Assess vascular access sites hourly  3.  Every 4-6 hours minimum:  Change oxygen saturation probe site  4.  Every 4-6 hours:  If on nasal continuous positive airway pressure, respiratory therapy assess nares and determine need for appliance change or resting period.  Outcome: Progressing     Problem: Pain  Goal: Verbalizes/displays adequate comfort level or baseline comfort level  Outcome: Progressing     Problem: Chronic Conditions and Co-morbidities  Goal: Patient's chronic conditions and co-morbidity symptoms are monitored and maintained or improved  Outcome: Progressing  Flowsheets (Taken 4/22/2024 0800)  Care Plan - Patient's Chronic Conditions and Co-Morbidity Symptoms are Monitored and Maintained or Improved: Monitor and assess patient's chronic conditions and comorbid symptoms for stability, deterioration, or improvement

## 2024-04-22 NOTE — CARE COORDINATION
Transition of Care Plan:     Home with daughter   - Home health - [facility]  - [facility] on [facility]    Transport: BLS    RUR: 39%  Prior Level of Functioning: Pt needed assistance with adls/bathing and iadls  Disposition: Home with [facility] - declining SNF due to lack of options (PD)  Follow up appointments: PCP & Specialist   DME needed: none  Transportation at discharge: Family  IM/IMM Medicare/ letter given: 4/19/24   Caregiver Contact: DaughterChaz [phone]  Discharge Caregiver contacted prior to discharge? yes  Care Conference needed? no  Barriers to discharge: JESSY Wright (Ally).

## 2024-04-22 NOTE — FLOWSHEET NOTE
CCPD Connection: 04/22/24 1745   Vitals   /79   Temp 100.1 °F (37.8 °C)   Pulse (!) 104   Respirations 26   SpO2 100 %   Observations & Evaluations   Level of Consciousness 0   Oriented X 4   Heart Rhythm Regular   Respiratory Quality/Effort Unlabored   O2 Device None (Room air)   Bilateral Breath Sounds Clear   Skin Condition/Temp Warm;Dry   Abdomen Inspection Soft;Obese   Edema None   Peritoneal Dialysis Catheter Mid lower abdomen   No placement date or time found.   Catheter Location: Mid lower abdomen   Status Accessed   Site Condition Clean, dry, intact   Dressing Status Clean, dry & intact   Dressing Gauze   Date of Last Dressing Change 04/22/24   Dialysis Type Continuous cycling   Exit Site Condition excellent   Catheter Care Given Yes   Cycler   Verification of Prescription CCPD   Informed Consent  Yes   Total Volume Programmed 45728 mL   Therapy Time (Hours:Minutes) 10:00   Cycler Type [facility]   Fill Volume 2500 mL   Last Fill Volume 0 mL   Dextrose Setting Same (Nonextraneal)   I Drain Alarm 0 mL   Number of Cycles 5   Bag Volume 6000 mL   Number of Bags Used 2   Dianeal Solution   (bag 1&3 Dextrose 1.5% in 6000 mL, bag 2 Dextrose 2.5% in 6000 mL)           Hepatitis B Surface Ag   Date/Time Value Ref Range Status   04/18/2024 01:19 AM <0.10 Index Final            Hep B S Ab   Date/Time Value Ref Range Status   04/16/2024 10:09 AM <3.10 mIU/mL Final   Time Out (time): 1745  Primary RN SBAR: YVON Anders, RN, RN  Patient Education: Infection prevention  Comments: Remained present for initial drain and initiation of first fill.     Start time 1800  Est End time 0400

## 2024-04-22 NOTE — PROGRESS NOTES
[address], [facility] [place] [address]  Phone: [phone]   [phone]    [email or web address]     Nephrology Progress Note    Patient Name : Galilea Aguilar      : 1955     MRN : [id number]  Date of Admission : 2024  Date of Servive : 24    CC:  Follow up for ESRD       Assessment and Plan   ESRD on PD:  - Dr. Chiu pt, home Rx: 4 exchanges, 2.3L, LF of extraneal 1L, total time 9 hrs  -  PD cell count (-) culture (-)  - continue current CCPD orders, change to 1.5% bags    HypoK  - liberalize K intake     AMS:  - CT head neg  - CT abdomen:  Cholelithiasis without evidence of cholecystitis.. Hepatic parenchymal disease suspicious for cirrhosis. fatty growth in the right retroperitoneum   - Ammonia level 38  - 417 BC NGTD  - CXR clear    Hx of peritonitis:  - PD cell count, Cx (-)    Anemia of CKD:  - hgb 9.5  - continue with LIS    Secondary HPT    DM2  HTN  COPD  CAD s/p CABG 23  Hx of CVA     Interval History:  Seen and examined . Orthostatic this morning,. Less symptomatic than expected. Eating breakfast, reports back pain      Review of Systems: Pertinent items are noted in HPI.    Current Medications:   Current Facility-Administered Medications   Medication Dose Route Frequency    dianeal lo-brandi 1.5% 6,000 mL  6,000 mL IntraPERitoneal Daily before dinner    dianeal lo-brandi (ULTRABAG) 2.5% 6,000 mL  6,000 mL IntraPERitoneal Daily before dinner    dianeal lo-brandi 1.5% 6,000 mL  6,000 mL IntraPERitoneal Daily before dinner    albumin human 5% IV solution 25 g  25 g IntraVENous Once    midodrine (PROAMATINE) tablet 10 mg  10 mg Oral TID WC    pantoprazole (PROTONIX) 40 mg in sodium chloride (PF) 0.9 % 10 mL injection  40 mg IntraVENous Daily    calcium carbonate (TUMS) chewable tablet 500 mg  500 mg Oral TID PRN    carvedilol (COREG) tablet 6.25 mg  6.25 mg Oral BID WC    ibuprofen (ADVIL;MOTRIN) tablet 400 mg  400 mg Oral Q12H PRN  normal respiratory effort  CV:  RRR,  no murmur or rub, LE edema  GI:  Soft, NT, + BS, PD cath exit site clean  Neurologic:  Non focal  Psych:             AAO x 3 appropriate affect   Skin:  No Rash    []    High complexity decision making was performed  []    Patient is at high-risk of decompensation with multiple organ involvement    Lab Data Personally Reviewed: I have reviewed all the pertinent labs, microbiology data and radiology studies during assessment.    Labs:  Recent Labs     04/21/24  0614      K 3.4*      CO2 27   GLUCOSE 155*   BUN 15   CREATININE 5.55*   CALCIUM 7.3*         Recent Labs     04/21/24  0614   WBC 8.2   RBC 3.07*   HGB 8.9*   HCT 28.2*   MCV 91.9   MCH 29.0   MCHC 31.6   RDW 17.3*      MPV 10.1       No results for input(s): \"TP\", \"ALB\", \"GLOB\", \"AML\" in the last 72 hours.    Invalid input(s): \"SGOT\", \"GPT\", \"AP\", \"TBIL\", \"AMYP\", \"LPSE\", \"LAC\"    No results for input(s): \"INR\", \"APTT\" in the last 72 hours.    Invalid input(s): \"PTP\"   No results for input(s): \"CPK\", \"CKMB\", \"TROPONINI\" in the last 72 hours.    Invalid input(s): \"B-NP\"  Invalid input(s): \"PHI\", \"PCO2I\", \"PO2I\", \"FIO2I\"     Ventilator:       Microbiology:  No results found for: \"SDES\"  No components found for: \"CULT\"      I have reviewed the flowsheets.  Chart and Pertinent Notes have been reviewed.   No change in PMH ,family and social history from Consult note.      Adria Mary MD  [facility]

## 2024-04-22 NOTE — PROGRESS NOTES
Occupational Therapy  4/22/2024    Attempted to see patient for OT session, ECHO at bedside. Will defer and reattempt as able. Thank you.     Carolin Aguilar, OTD, OTR/L

## 2024-04-22 NOTE — PROGRESS NOTES
Hospitalist Progress Note  DAYA Blankenship - NP  Answering service: [phone]        Date of Service:  2024  NAME:  Galilea Aguilar  :  1955  MRN:  [id number]    Admission Summary:   Ms. Aguilar is a 68-year-old female with a past medical history of ESRD (on PD), anemia, CAD (status post CABG), CVA, DM, COPD, hypertension, hyperlipidemia who presented with a 1 day history of altered mental status.  Patient's daughter reported patient had confusion and disorientation with an episode of increased sleepiness and seeming to \"nod off\" + reported vomiting and diarrhea.  In the ED, patient appeared to be back to her baseline.  And reportedly doing her PD without issues.    Interval history / Subjective:      Severe orthostasis this am. 65/42 upon standing prior to midodrine, 83/45 standing after midodrine  Albumin today per renal  Decrease Coreg to 3.125  Check Echo  Discussed with nursing to place VANESSA hose  Pt lying in bed, states she feels better today  mild pain to left lower back (fall 1 month ago)  negative thoracic/lumbar X-rays  Updated pt's son at bedside    Assessment & Plan:     Altered Mental Status (resolved)   Unclear etiology   - chest Xray with no acute process  - CT head no acute intracranial process  - UA with no evidence of infection  - no leukocytosis, no fevers.    - Oriented with no reports of hallucinations today     Orthostatic hypotension  - Has received IV fluids  - resume coreg at lower dose today as patient with ongoing tachycardia   - Recommended VANESSA hose   - Discussed with nephrology, they will be adjusting her dialysis late to pull less fluid  - added midodrine 5 mg TID     Lactic Acidosis  - no s/s infection  - on admit, pt was hypotensive and no temp was able to be obtained  - 3.3-4.6: appears they have been chronically high in the past as well.  Stop checking routinely  - fluid

## 2024-04-22 NOTE — PROGRESS NOTES
Orthostatic Vitals:      4/22/2024    10:00 AM   Orthostatic Vitals   Orthostatic B/P and Pulse? Yes   Blood Pressure Lying 130/66   Pulse Lying 103 PER MINUTE   Blood Pressure Sitting 95/55   Pulse Sitting 105 PER MINUTE   Blood Pressure Standing 83/45   Pulse Standing 106 PER MINUTE

## 2024-04-23 NOTE — PROGRESS NOTES
2350: TRANSFER - IN REPORT:    Verbal report received from Hilaria MARRERO on Galilea Aguilar  being received from [facility] for change in patient condition (hypotension)      Report consisted of patient's Situation, Background, Assessment and   Recommendations(SBAR).     Information from the following report(s) Nurse Handoff Report, Intake/Output, MAR, and Recent Results was reviewed with the receiving nurse.    Opportunity for questions and clarification was provided.      Assessment completed upon patient's arrival to unit and care assumed.     [id number]: Patient arrived from [facility] s/p RR for hypotension. Levo gtt infusing @ 15mcg upon arrival. Patient had already received the 500cc NS and 1L LR bolus. Patient A&O x4. Levo gtt up titrated to 19mcg. RADHA Park at bedside to place central line. Plan to start Vaso gtt if Levo gtt goes above 20mcg. Patient's PD initially restarted per MD Isabel but by 0200 was stopped d/t hypotension. JANES Means to bedside and stated to let patient dwell until AM when next shift will come draw cell count and disconnect patient. Radiologist called and reported patient has air in bladder wall & evidence of cystitis from abdomen CT. Radiologist's findings reported to RADHA Park. C02 15 on BMP; RADHA Park ordered x1 amp bicarb and bicarb gtt. Mg & Phos repletions given. Patient straight cath'ed for ~ 100cc of pink/tan cloudy/creamy urine; UA w/reflex sent. Patient came w/cell phone & purse; patient declined for any valuables to be given to Security. Daughter at bedside and updated by RADHA Park & RN.

## 2024-04-23 NOTE — PROGRESS NOTES
Renal Update :    Continued worsening of acidemia w/ severe lactic acidosis   Increasing pressor needs   Agree w/ Plans for intubation   Discussed w/ daughter Chaz who lives with Jeff and her primary care giver and updated pts clinical condition   She reports no AMD or prior expressed wishes about code status   She requested to proceed with intubation and keep her Full code for now   Consented for Jone placement and CRRT       [facility]  Office :[phone]  Fax: [phone]

## 2024-04-23 NOTE — PROGRESS NOTES
Physical Therapy 4/23/2024     Chart reviewed up to date, RN cleared patient for PT session. Met with patient at bedside and noted increased work of breathing at rest and patient reporting fatigue. SpO2 via tele in 90s but variable pleth noted. RN notified and present at bedside to assess patient. Will defer for now and continue to follow.    Thank you,  Kaelyn Shields PT, DPT, NCS

## 2024-04-23 NOTE — PROGRESS NOTES
4 Eyes Skin Assessment     NAME:  Galilea Aguilar  YOB: 1955  MEDICAL RECORD NUMBER:  [id number]    The patient is being assessed for  Transfer to New Unit    I agree that at least one RN has performed a thorough Head to Toe Skin Assessment on the patient. ALL assessment sites listed below have been assessed.      Areas assessed by both nurses:    Head, Face, Ears, Shoulders, Back, Chest, Arms, Elbows, Hands, Sacrum. Buttock, Coccyx, Ischium, Legs. Feet and Heels, and Under Medical Devices         Does the Patient have a Wound? No noted wound(s)       Angelo Prevention initiated by RN: Yes  Wound Care Orders initiated by RN: No    Pressure Injury (Stage 3,4, Unstageable, DTI, NWPT, and Complex wounds) if present, place Wound referral order by RN under : No    New Ostomies, if present place, Ostomy referral order under : No     Nurse 1 eSignature: Electronically signed by Alexandra Gonzales RN on 4/23/24 at 1:15 AM EDT    **SHARE this note so that the co-signing nurse can place an eSignature**    Nurse 2 eSignature: Electronically signed by Ata Sanderson RN on 4/23/24 at 1:17 AM EDT

## 2024-04-23 NOTE — PROGRESS NOTES
Spiritual Care Assessment/Progress Note  [facility] [place]    Name: Galilea Aguilar MRN: [id number]    Age: 68 y.o.     Sex: female   Language: English     Date: 4/23/2024            Total Time Calculated: 20 min              Spiritual Assessment begun in [facility] 7S2 INTENSIVE CARE  Service Provided For:: Patient  Referral/Consult From:: Rounding  Encounter Overview/Reason : Spiritual/Emotional Needs    Spiritual beliefs:      [x] Involved in a filemon tradition/spiritual practice: Gnosticism     [] Supported by a filemon community:      [] Claims no spiritual orientation:      [] Seeking spiritual identity:           [] Adheres to an individual form of spirituality:      [] Not able to assess:                Identified resources for coping and support system:   Support System: Children       [x] Prayer                  [] Devotional reading               [] Music                  [] Guided Imagery     [] Pet visits                                        [] Other: (COMMENT)     Specific area/focus of visit   Encounter:    Crisis:    Spiritual/Emotional needs: Type: Spiritual Distress  Ritual, Rites and Sacraments:    Grief, Loss, and Adjustments:    Ethics/Mediation:    Behavioral Health:    Palliative Care:    Advance Care Planning:      Plan/Referrals: Continue Support (comment)    Narrative:     Initial visit and assessment for Galilea Aguilar. I introduced myself, role, and explored feelings/concerns. 's summarized her medical journey, including the many hospitalizations she's had since January. Today she was suffering with breathing and overall discomfort. She reports that she has a daughter and two sons who are actively involved with her care. She is hoping for a full recovery and to be able to return to her home.    I acknowledged her feelings and long recovery process. I offered healing touch by holding her hand. I facilitated prayer for healing and comfort.     Ms. Aguilar was appreciative of my visit and prayer.

## 2024-04-23 NOTE — PROCEDURES
Procedure Note - Central Venous Access:   Performed by DAYA Esquivel NP  Diagnosis: septic shock  Insertion Date: 04/23/24  Time:1:24 AM  Obtained Consent? yes; verbal; patient   Procedure Location:  ICU.      Immediately prior to the procedure, the patient was reevaluated and found suitable for the planned procedure and any planned medications.  Immediately prior to the procedure a time out was called to verify the correct patient, procedure, equipment, staff, and marking as appropriate.    Central line Bundle:  Full sterile barrier precautions used.  7-Step Sterility Protocol followed.  (cap, mask sterile gown, sterile gloves, large sterile sheet, hand hygiene, 2% chlorhexidine for cutaneous antisepsis)  5 mL 1% Lidocaine placed at insertion site.      Patient positioned in Trendelenburg? yes   The site was prepped with chloroprep.   Catheter TLC.     Using Seldinger technique a Arrow TLC was placed in the RIJ via direct cannulation with 1 number of attempts for IV access and vasopressors.   Ultrasound Guidance was utilized.    There was good dark, non-pulsatile blood return in all ports.   Femoral Site? no. If Yes, reason femoral site was chosen: NA.  The following complications were encountered: none.  A follow-up chest x-ray was ordered post procedure.    The Post Central Line Placement bundle was ordered post procedure.    The procedure was tolerated well.      DAYA Esquivel NP  Critical Care Medicine  [facility]

## 2024-04-23 NOTE — PROGRESS NOTES
Patient was transfer ICU due change of condition while receiving Peritoneal  dialysis . Noted a  gradual decrease in Blood pressure (91/48)with elevated temp(100.5), skin very warm , PRN Tylenol pulled but was unable to give due to pt was having multiple soft stools, patient  also c/o N/V  received Zofran IV  . Patient A/Ox3 and able to follow verbal command. NP on unit and made aware, new orders for albumin x2 Stat labs with blood culture , IV bolus LR and stat CTA was done before transfer to ICU.

## 2024-04-23 NOTE — PROGRESS NOTES
Critical Care Documentation    Name: Galilea Aguilar  YOB: 1955  MRN: [id number]  Admission Date: 4/16/2024  2:42 AM    Date of service: 4/22/2024    Active Diagnoses:  Septic shock      Chief Complaint:  Left flank/abdominal pain    Clinical Presentation:  Hypotensive SBP 60-80, respiratory rate 30s, 100% on room air, febrile 102.1, abdomen is nondistended, nontender, patient actively receiving PD, she is alert and oriented x 4    Physical Exam:     General : alert x 3, awake, acutely distressed  HEENT: PEERL, EOMI, moist mucus membrane  Neck: supple, no JVD, no meningeal signs  Chest: Clear to auscultation bilaterally, tachypnea  CVS: S1 S2 heard, Capillary refill less than 2 seconds  Abd: soft/ Non tender, non distended, BS physiological, PD cath present  Ext: no clubbing, no cyanosis, no edema, brisk 2+ DP pulses  Neuro/Psych: drowsy, CN 2-12 grossly intact, sensory grossly within normal limit  Skin: hot, dry    Data Reviewed:   All diagnostic labs and studies have been reviewed.      Assessment and Plan:  While doing routine rounds on [facility] I observed monitor showing BP 64/40 at 2244, immediately relayed concerns to nursing stuff, ordered bolus and albumin and called a rapid response at 2246 for hypotension and concern for septic shock. Discussed concerns with attending MD who is in agreement with blood cultures and PD fluid culture given concern for septic shock.  Point-of-care lactic 16.47, markedly elevated compared to her chronic lactic acidosis ranging 2-4.  Rectal temp 102.1 VBG: pH 7.4, CO2 23.9, 14.9, blood sugar 180.  The patient allergy to contrast, will do CT abdomen without contrast.  Will also obtain CMP, mag, Phos, chest x-ray, and lab lactic. Vancomycin and cefepime initiated.  Blood pressure minimally responsive to fluid and albumin, levo started and ICU consulted. ICU NP evaluated patient prior to CT.    Informed daughter of the above events and need for transfer to ICU, she

## 2024-04-23 NOTE — PROGRESS NOTES
Day #1 of Cefepime  Indication:  Sepsis  Current regimen:  2 gm q12h  Abx regimen: Vanc+Cefepime   Recent Labs     24  0614   WBC 8.2   CREATININE 5.55*   BUN 15     Est CrCl: PD  Temp (24hrs), Av.2 °F (37.3 °C), Min:98.1 °F (36.7 °C), Max:100.5 °F (38.1 °C)    Cultures: Blood-pending     Plan: Change to 2 gm x1 then 1 gm q24h per protocol    Davidson Graham, PharmD

## 2024-04-23 NOTE — PROCEDURES
Procedure Note - Temporary HD Catheter Placement (Jone):   Performed by Jose Mcleod MD     Diagnosis: ESRD, septic shock  Insertion Date: 04/23/24  Time:6:02 PM   Obtained Consent? yes; informed   Procedure Location:  ICU.      Immediately prior to the procedure, the patient was reevaluated and found suitable for the planned procedure and any planned medications.  Immediately prior to the procedure a time out was called to verify the correct patient, procedure, equipment, staff, and marking as appropriate.    Central line Bundle:  Full sterile barrier precautions used.  7-Step Sterility Protocol followed.  (cap, mask sterile gown, sterile gloves, large sterile sheet, hand hygiene, 2% chlorhexidine for cutaneous antisepsis)  5 mL 1% Lidocaine placed at insertion site.      Patient positioned in Trendelenburg?yes   The site was prepped with ChloraPrep and Sterile draping.   Catheter inserted into a new site.     Using Seldinger technique a Hemodialysis Catheter was placed in the Left, Internal Jugular Vein via direct cannulation with 1 number of attempts for Monitoring, Blood Drawing, and Dialysis.  (Prior attempt by other provider)  Ultrasound Guidance was utilized.    There was good dark, non-pulsatile blood return in all ports.  Femoral Site? no.   Catheter secured. Biopatch/CHG bio-occlusive dressing in place? yes.   The following complications were encountered: None.  A follow-up chest x-ray ordered post procedure.    The procedure was tolerated well.    Jose Mcleod MD  Critical Care Medicine  [facility]

## 2024-04-23 NOTE — CONSULTS
CRITICAL CARE NOTE      Name: Galilea Aguilar   : 1955   MRN: [id number]   Date: 2024      REASON FOR ICU ADMISSION: septic shock     PRINCIPAL ICU DIAGNOSIS     Septic shock  AMS  CAD  Asthma  COPD  DM  ESRD, PD  GERD  Stroke  HLD  HTN    BRIEF PATIENT SUMMARY     69 yo F admitted from home HD6 to IM for AMS, orthostatic hypotension and lactic acidosis. Has been treated for hypotension with IVF and midodrine, IM also resumed coreg d/t tachycardia. AMS cleared with HCT, no acute finding. Fever work up negative with blood cx and PD fluid and UA.    RRT tonight for hypotension. Neuro intact. IVF, Abx and NE gtt started. Non con CT chest and abdomen done. Transferred to ICU for cont work up and Mx.    HOSPITAL COURSE/DAILY EVENT LOG     24 HOUR EVENTS: as above    COMPREHENSIVE ASSESSMENT & PLAN:SYSTEM BASED     NEUROLOGICAL:     Aggressive Pain Control.  Spontaneous Awakening Trial: Pending  Pain Medications: PRN  Target RASS: 0  Sedation Medications: none    PULMONOLOGY:     Pulmonary toilet: HOB, IS, pulm nebs  SpO2 Goal: > 92%      Chest CT, pending   IMPRESSION:     1. Pulmonary edema and bilateral pleural effusions similar to prior study.  2. Interval development of gas within the wall of the urinary bladder and adjacent extraperitoneal pelvic soft tissues, most likely representing emphysematous cystitis. Likely accounts for small amount of gas in the bilateral renal collecting systems (reflux of gas from the bladder). No renal parenchymal gas.  3. Indwelling peritoneal dialysis catheter with moderate ascites. Single locule of gas near the catheter in the left lower quadrant. No significant pneumoperitoneum.    CARDIOVASCULAR:     Vasopressors to keep MAP 65 and above for adequate tissue perfusion.   SBP Goal of: > 90 mmHg  MAP Goal of: > 65 mmHg  Cardiac Gtts: NE  to maintain SBP/MAP goals  Transfusion Trigger (Hgb): <7 g/dL  Keep K > 4; Mg > 2       Echo    Left Ventricle: Moderately  27.70 kg/m²      Temp (24hrs), Av.2 °F (37.3 °C), Min:98.1 °F (36.7 °C), Max:100.5 °F (38.1 °C)           Intake/Output:     Intake/Output Summary (Last 24 hours) at 2024 2335  Last data filed at 2024 0310  Gross per 24 hour   Intake --   Output 838 ml   Net -838 ml       Imaging    No valid procedures specified.       CRITICAL CARE DOCUMENTATION  I had a face to face encounter with the patient, reviewed and interpreted patient data including clinical events, labs, images, vital signs, I/O's, and examined patient.  I have discussed the case and the plan and management of the patient's care with the consulting services, the bedside nurses and the respiratory therapist.      NOTE OF PERSONAL INVOLVEMENT IN CARE   This patient has a high probability of imminent, clinically significant deterioration, which requires the highest level of preparedness to intervene urgently. I participated in the decision-making and personally managed or directed the management of the following life and organ supporting interventions that required my frequent assessment to treat or prevent imminent deterioration.    I personally spent 75 minutes of critical care time.  This is time spent at this critically ill patient's bedside actively involved in patient care as well as the coordination of care.  This does not include any procedural time which has been billed separately.    Christopher Park, APRN - NP   Critical Care Medicine  [facility]

## 2024-04-23 NOTE — DIALYSIS
04/23/24 1905   Treatment   $CRRT $Yes   Machine #   ([id number])   Cartridge Lot #   ([id number])   Therapy Type CVVH   Pressures   Access (mmHg) (!) -19 mmHg   Return/Venous (mmHg) 50 mmHg   Effluent (mmHg) 59 mmHg   Filter (mmHg) 120 mmHg   TMP Pressure (mmHg) 16 mmHg   Pressure Drop (mmHg) 34 mmHg   Deaeration Chamber Check Yes   Flow Rates   Therapy Fluid (L/hr) 2.4 L/hr   Blood Flow (mL/min) 200 mL/min   Replacement Fluid Pre-Filter (mL/hr) 600 mL/hr   Replacement Filter Post-Filter (mL/hr) 600 mL/hr   Pre-Blood Pump (mL/hr) 1200 mL/hr   System Used   System Used Frances   Frances Calculation   (F) Amount Programmed to Remove (H from last hour) 0   CRRT Activities   Intervention Initiated        04/23/24 1905   Treatment   Time On 1905   Observations & Evaluations   Level of Consciousness 3   Oriented X   (UTD, intubated/sedated)   Respiratory Quality/Effort Unlabored   FiO2  70 %   Bilateral Breath Sounds Rhonchi   Skin Color   (appropriate for ethnicity)   Skin Condition/Temp Cool;Dry   Abdomen Inspection Soft;Rounded   Edema None   Vital Signs   Pulse 60   Pain Assessment   Pain Assessment Critical Care Pain Observation Tool (CPOT)   Pain Level 0   Technical Checks   Prime Volume (mL) 186 mL   ICEBOAT I;C;E;B;O;A;T   Bath Temperature 98.6 °F (37 °C)   During Hemodialysis Assessment   ABP (Arterial line BP) (!) 81/34   Dialysis Bath   K+ (Potassium) 4   Ca+ (Calcium) 2.5       Primary RN SBAR: GIL Huff. RN  Patient Education provided: Education provided to patient prior to intubation; pd to be held, crrt initiated. Dr. Mary provided extensive education via telephone to daughter, Chaz Aguilar, consent obtained/signed by MD.  Preferred Education method and Primary language: English/Verbal  Hospital associated wait time; reason: 90min; arrived for CCPD prior to initiation decision made for intubation, line placement and CRRT initiation.     Hepatitis B Surface Ag   Date/Time Value Ref Range Status   04/18/2024

## 2024-04-23 NOTE — PROGRESS NOTES
Occupational Therapy  4/23/2024    Chart reviewed and conferred with RN who cleared pt for OT session. Noted overnight events, patient requiring transfer to ICU level of care and multiple pressors for hypotension. Patient received supine in bed resting, politely requesting to defer at this time due to fatigue and SOB. RN notified. Will defer and continue to follow.     Carolin Aguilar, JULIO CÉSAR, OTR/L

## 2024-04-23 NOTE — FLOWSHEET NOTE
04/23/24 1045   Vitals   BP (!) 95/50   Pulse 98   Respirations 25   SpO2 100 %   Observations & Evaluations   Level of Consciousness 0   Oriented X 3   Heart Rhythm Regular   Respiratory Quality/Effort Dyspnea at rest   O2 Device None (Room air)   Bilateral Breath Sounds Clear;Diminished   Skin Color   (appropriate for ethnicity)   Skin Condition/Temp Warm;Dry   Abdomen Inspection Soft;Rounded   Bowel Sounds (All Quadrants) Active   Edema None   Peritoneal Dialysis Catheter Mid lower abdomen   No placement date or time found.   Catheter Location: Mid lower abdomen   Status Clamped   Site Condition Clean, dry, intact   Dressing Status Clean, dry & intact   Dressing Gauze   Date of Last Dressing Change 04/22/24   Dialysis Type Continuous cycling   Post-Treatment (Cycler)   Average Dwell Time (Hours:Minutes)   (pt cycler treatment stopped during dwell 3 due to hypotension (per ICU md))   Effluent Appearance Clear;Yellow   PD Output (mL)   (pt drained with specimen collected this am, it appears she was +701ml overnight.)   Volume Gain (mL) 701 mL          04/23/24 1046   Treatment   Time Off 1045   Technical Checks   ICEBOAT I;C;E;B;O;A;T     Primary RN SBAR: GIL Huff RN  Patient Education provided: Reviewed overnight events, plan of care, safety and infection prevention with patient.   Preferred Education method and Primary language: English/Verbal  Hospital associated wait time; reason: 1.5 hours to drain; specimens collected, reviewed with Dr. Mary  Hepatitis B Surface Ag   Date/Time Value Ref Range Status   04/18/2024 01:19 AM <0.10 Index Final     Hep B S Ab   Date/Time Value Ref Range Status   04/16/2024 10:09 AM <3.10 mIU/mL Final     Pt orders, notes, labs and code status reviewed. Time out complete.   *Pt became hypotensive overnight, transf to ICU with treatment stopped per ICU MD due to hypotension.   Patient remained in dwell overnight, able to drain via cycler with specimen collection this am.

## 2024-04-23 NOTE — PROGRESS NOTES
[facility] Progress Note        Galilea Aguilar  1955  [id number]  4/23/2024      Assessment and plan:  Septic shock  -concern for worsening cystitis with ?  Perf  -lactate from 14 ---> 20 despite pressors and resuscitation  -new and exquisite abd tenderness this afternoon   -stat abd CT   -cont NE/vaso/epi  -cont IVF resuscitation  4 bicarb amps and epi pushes x 2.5  -cont IVF resuscitation-  3L  given during intubation and procedures    -lactate > 20  -tob 5mg/kg x 1     Acute hypoxic respiratory failure:  -massive emesis on intubation  -cont mechanical ventilation  -vent changes made per ABG  -increased RR to 18  -ABG 7.0/34/222    Acute hypoglycemia in the setting of DM type 2:    -d50 x 2 amp   -start D5 NS      AMS  -better this am  -waxes and wanes per chart review and Dr Mary who knows her well     ESRD, PD  -start CRRT  -hold PD    Asthma/COPD:  -albuterol prn    ICU sedation:  -start fentanyl gtt  -prn ativan     HPI:  Critically ill.  Decompensated late afternoon.  Lactate to 20 despite resuscitation.  Developed respiratory distress.  Emergently intubated.  [place] and HD cath placed.      Dr Quiñones, Dr Chauhan and RADHA Aguilera at bedside to help stabilize.        ICU DAILY CHECKLIST     Code Status:full   DVT Prophylaxis: hep subcu   T/L/D: FARIDA CVL, L IJ suly, fem a line   SUP: ppi   Diet: NPO start TF tomorrow   Activity Level:as tolerated  ABCDEF Bundle/Checklist Completed:Yes  Disposition: cont   Icu care    Multidisciplinary Rounds Completed: yes  Goals of Care Discussion/Palliative: yes  Patient/Family Updated: yes      OBJECTIVE  Vitals:    04/23/24 0708 04/23/24 0710 04/23/24 0722 04/23/24 0800   BP:  110/64  (!) 105/49   Pulse: (!) 106 (!) 103  (!) 101   Resp: 20 29  (!) 33   Temp:  97.9 °F (36.6 °C)  97.8 °F (36.6 °C)   TempSrc:  Axillary  Oral   SpO2: 100%  100% 100%   Weight:       Height:         EXAM:   GEN: awake alert conversant   HEENT  -Head: NC/AT;  -Eyes:  PERRL, EOMI. No discharge or redness;  -Ears: External ears are normal. Normal TMs.  -Nose: Normal nares.  -Mouth and throat: MMM. Normal gums, mucosa, palate,. Good dentition.  NECK: Supple, with no masses. No JVD   CV: RRR, no m/r/g.  LUNGS: CTAB, no w/r/c.  ABD: Soft, NT/ND, no masses, +++ TTTP    SKIN: Warm, well perfused. No skin rashes or abnormal lesions.  EXT: No clubbing, cyanosis, or edema.    LABS:   Na 140  K 4.2  Cr 5.69    WBC 5.0  Hb 7.5  Plt 110    Imaging reviewed   Abd ct from yesterday reviewed and shows acute cystitis.     CCM time:   55 mins.  This patient is critically ill with risk of clinical deterioration.  The documented time was time spent providing direct patient care, formulating care plan and discussing this plan with other providers, updating family and discussing goals of care with patient and/or family. It does not include time spent performing any procedures that are billed separtately.    Lizzie Nugent MD    Pulmonary and Critical Care Medicine   [facility]  [phone]  4/23/2024

## 2024-04-23 NOTE — CONSENT
Informed Consent for Blood Component Transfusion Note    I have discussed with the patient the rationale for blood component transfusion; its benefits in treating or preventing fatigue, organ damage, or death; and its risk which includes mild transfusion reactions, rare risk of blood borne infection, or more serious but rare reactions. I have discussed the alternatives to transfusion, including the risk and consequences of not receiving transfusion. The patient had an opportunity to ask questions and had agreed to proceed with transfusion of blood components.    Electronically signed by DAYA Esquivel NP on 4/23/24 at 3:55 AM EDT

## 2024-04-23 NOTE — PROGRESS NOTES
Pharmacist Note - Vancomycin Dosing    Consult provided for this 68 y.o. female for indication of Sepsis.  Antibiotic regimen(s): Vanc+Cefepime   Patient on vancomycin PTA? NO     Recent Labs     24  0614   WBC 8.2   CREATININE 5.55*   BUN 15     Frequency of BMP: None  Height: 160 cm  Weight: 70.9 kg  Est CrCl: PD  Temp (24hrs), Av.2 °F (37.3 °C), Min:98.1 °F (36.7 °C), Max:100.5 °F (38.1 °C)    Cultures:  Blood- pending     MRSA Swab ordered (if applicable)? N/A    The plan below is expected to result in a target range of Trough 10-15 mcg/mL    Therapy will be initiated with a loading dose of 1750 mg IV x 1 then hold.  Will dose Vancomycin based on random levels.  Pharmacy to follow patient daily and order levels / make dose adjustments as appropriate.    Davidson Graham, BrigidaD

## 2024-04-23 NOTE — PROCEDURES
Procedure Note - Intubation:   Performed by Lizzie Nugent MD .      Diagnosis: shock   Insertion Date: 4/23/24 Time: 1730  Obtained Consent? no; emergent   Procedure Location:  ICU.       Immediately prior to the procedure, the patient was reevaluated and found suitable for the planned procedure and any planned medications.  Immediately prior to the procedure a time out was called to verify the correct patient, procedure, equipment, staff, and marking as appropriate.     Preoxygenation applied via BVM  Medications given were etomidate and rocuronium (Zemuron).   Grade 3 view obtained with S3 blade  A number 7.5 cuffed   ETT was placed to 24 cm at the teeth.   Placement was evaluated by noting bilateral, symmetric breath sounds and good end-tidal CO2 detector color change .    Attempts required: 2.    Complications: aspiration.    RSI was used..  The procedure was tolerated moderately.  A follow-up chest x-ray was ordered post procedure.       Massive emesis upon RSI med administration.  Yanker to esophagus then OG placed.      Intubation done on first pass post emesis.      Lizzie Nugent MD  Critical Care Medicine  [facility]

## 2024-04-23 NOTE — PROGRESS NOTES
[facility]   [address], [place] [address]  Phone: [phone]   [phone]    [email or web address]     Nephrology Progress Note    Patient Name : Galilea Aguilar      : 1955     MRN : [id number]  Date of Admission : 2024  Date of Servive : 24    CC:  Follow up for ESRD       Assessment and Plan   ESRD on PD:  - Dr. Chiu pt, home Rx: 4 exchanges, 2.3L, LF of extraneal 1L, total time 9 hrs  -  PD cell count (-) culture (-)  - repeat PD fluid cell counts today -- less likely to have peritonitis  - continue current CCPD orders  - Minimize crystalloids for IVF     Lytes :   - Hypo K/ HypoMg/ Hypoalbuminemia - 2/2 malnutrition   - replete PRN     Sepsis w/ shock   Lactic acidosis : impaired hepatic clearance   - CT concerning Emphysematous Cystitis +/- Pyleo   - Blood Cx pending   - abx per [facility]     Cirrhosis w/ Hyperammonemia   CVA MRI --> old lacunar infarcts w/ surrounding hemorrhage   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 Overnight events noted,. CT with mild Pulm edema , no effusions as reported. Lactate still rising. Afebrile , normal WBC, PD fluid clear   Reports constant urge to urinate and dysuria     Review of Systems: Pertinent items are noted in HPI.    Current Medications:   Current Facility-Administered Medications   Medication Dose Route Frequency    ALTEplase (CATHFLO) 1 mg in sterile water 1 mL injection  1 mg IntraCATHeter PRN    VASOpressin (VASOSTRICT) 20 units in sodium chloride 0.9% 100 mL infusion  0.01-0.03 Units/min IntraVENous Continuous    potassium phosphate 30 mmol in sodium chloride 0.9 % 250 mL IVPB for central line  30 mmol IntraVENous Once    0.9 % sodium chloride infusion   IntraVENous PRN    sodium bicarbonate 8.4 % injection 50  28.2* 24.3*   MCV 91.9 96.0   MCH 29.0 29.2   MCHC 31.6 30.5   RDW 17.3* 18.6*    110*   MPV 10.1 9.9       Recent Labs     04/23/24  0008   GLOB 3.5       Recent Labs     04/23/24 0008   INR 1.4*   APTT 43.9*      No results for input(s): \"CPK\", \"CKMB\", \"TROPONINI\" in the last 72 hours.    Invalid input(s): \"B-NP\"  Invalid input(s): \"PHI\", \"PCO2I\", \"PO2I\", \"FIO2I\"     Ventilator:       Microbiology:  No results found for: \"SDES\"  No components found for: \"CULT\"      I have reviewed the flowsheets.  Chart and Pertinent Notes have been reviewed.   No change in PMH ,family and social history from Consult note.      Adria Mary MD  [facility]

## 2024-04-23 NOTE — SIGNIFICANT EVENT
Rapid Response Team    Rapid Response room 670 called at this time. Rapid Response Team Nurse responding.    A rapid response was called on this patient for Hypotension      Response    Rapid Response Team at the bedside for evaluation.    RADHA Jonas responding.    NSTU RN remained at the bedside during this episode.    Ongoing vital signs monitored throughout critical time.      Situation    Upon RRT arrival the Hospitalist NP advised that the patient was observed to become hypotensive to 60s/40s, and a rapid response was called.      On exam the patient is laying on the bed with eyes closed, groaning intermittently.  Pt opens her eyes to voice and is oriented x4 with some repetitive questions asked by the patient.  Lungs with some crackles in lower lobes, otherwise clear to auscultation.  VS assessed and significant for , RR 32, BP 89/42, MAP 51.  500mL NS bolus and 25g of 25% albumin being administered upon RRT arrival.  Additional IV access obtained, 20G right wrist, 18G left wrist, 18G RAC.  Abdomen soft and diffusely tender to palpation, with pt endorsing severe pain by her left flank.      At 2259 order received for additional labs and POCT VBG and Lactic Acid which were obtained and results provided to Hospitalist NP.      POC Lactic Acid   Date Value Ref Range Status   04/22/2024 16.47 (HH) 0.40 - 2.00 mmol/L Final      Davita paged regarding ongoing Peritoneal dialysis, and need for emergent CT/probable ICU transfer, dialysis RN returned page and gave instructions on how to pause PD machine and package it for transport, with the understanding that the patient must remain attached to the PD machine per Miguelangel RN.      Following fluid and albumin administration MAP remains low, in the high 40s-low50s.  Order received for Norepinephrine gtt which was initiated at 5mcg/min at 2310 at a MAP of 49.  [facility], respiratory, SpO2 and BP monitoring before being prepared to

## 2024-04-24 NOTE — PROGRESS NOTES
Physical Therapy 4/24/2024    Chart reviewed, patient received sedated and on vent. Per ABCDEF protocol, will work with patient when PEEP is 10.0 or less, FIO2 60% or less, and patient is following basic commands. Will follow patient peripherally.     Recommend nursing to complete with patient, as able and per protocol, in order to promote cardiopulmonary systems, maintain strength, endurance and independence:   -bed in chair position or maximize full reverse Trendelenburg position to facilitate upright activity with foot board and non-skid footwear on 3x/day ~30-60 mins each   -ROM during bathing B UEs and LEs  -positioning to prevent contractures and edema.  RASS -1/0/+1 (during SAT) Active ROM  Sitting (bed in chair position)  Standing (reverse Trendelenburg)  ADLs   RASS -3/2 Passive ROM  Sit (bed in chair position)   RASS -5/-4 Passive ROM     Thank you for your assistance.     Thank you,  Kaelyn Shields, PT, DPT, NCS

## 2024-04-24 NOTE — PROGRESS NOTES
Occupational Therapy  04/24/24    Chart reviewed, patient received sedated and on vent and CRRT. Spoke with primary RN. Per ABCDEF protocol, will work with patient when PEEP is 10.0 or less, FIO2 60% or less, and patient is following basic commands. Will follow patient peripherally.     Recommend nursing to complete with patient, as able and per protocol, in order to promote cardiopulmonary systems, maintain strength, endurance and independence:   -bed in chair position or maximize full reverse Trendelenburg position to facilitate upright activity with foot board and non-skid footwear on 3x/day ~30-60 mins each   -ROM during bathing B UEs and LEs  -positioning to prevent contractures and edema  RASS -1/0/+1 (during SAT) Active ROM  Sitting (bed in chair position)  Standing (reverse Trendelenburg)  ADLs   RASS -3/2 Passive ROM  Sit (bed in chair position)   RASS -5/-4 Passive ROM       Thank you for your assistance.   Lena Wright, OT

## 2024-04-24 NOTE — PROGRESS NOTES
initiated follow-up visit to Galilea Aguilar in [facility]S2 INTENSIVE CARE in response to page from bedside nurse. Reviewed the patient's medical record prior to this encounter. Granddaughter and son Piter were present. Family was tearful as they had recently received medical news.  Provided ministry of presence and words of care. Family expressed gratitude.  Informed family of  availability and encouraged them to request support as needed. For additional spiritual care, please contact the  on-call at [phone] ([phone]).     Rev. Unique Thompson MDiv, MSW  Chaplain Resident

## 2024-04-24 NOTE — PROGRESS NOTES
0903: Bicarb gtt increased to 150 mL/hr per order from MD Usman.     1015: MD Leo @ bedside. Pt WARD and kicking legs, RASS +1. Verbal order received to start Propofol gtt for sedation.     1211: MAP in 40s; Propofol gtt stopped and Precedex started for sedation.

## 2024-04-24 NOTE — CONSENT
Informed Consent for Blood Component Transfusion Note    I have discussed with the son and daughter the rationale for blood component transfusion; its benefits in treating or preventing fatigue, organ damage, or death; and its risk which includes mild transfusion reactions, rare risk of blood borne infection, or more serious but rare reactions. I have discussed the alternatives to transfusion, including the risk and consequences of not receiving transfusion. The son and daughter had an opportunity to ask questions and had agreed to proceed with transfusion of blood components.    Electronically signed by BOYD Mcfadden on 4/24/24 at 1:25 AM EDT

## 2024-04-24 NOTE — FLOWSHEET NOTE
CRRT Roundin/24/24 0957   Treatment   $CRRT $Yes   Machine #   ([id number])   Therapy Type CVVH   Pressures   Access (mmHg) (!) -48 mmHg   Return/Venous (mmHg) (!) 41 mmHg   Filter (mmHg) 125 mmHg   TMP Pressure (mmHg) 70 mmHg   Pressure Drop (mmHg) 45 mmHg   Deaeration Chamber Check Yes   Flow Rates   Therapy Fluid (L/hr) 2.4 L/hr   Blood Flow (mL/min) 200 mL/min   Replacement Fluid Pre-Filter (mL/hr) 600 mL/hr   Replacement Filter Post-Filter (mL/hr) 600 mL/hr   Pre-Blood Pump (mL/hr) 1200 mL/hr   Frances Calculation   (D) Physician Ordered Hourly Removal (mL) 0 ml   CRRT Activities   Intervention Ongoing   Ultrafiltrate Assessment   Ultrafiltrate Color Yellow/straw   Ultrafiltrate Appearance Clear   Hemodialysis Central Access Left Neck   Placement Date/Time: 24 1730   Present on Admission/Arrival: No  Orientation: Left  Access Location: Neck   Continued need for line? Yes   Site Assessment Clean, dry & intact   CVC Lumen Status Infusing   Venous Lumen Status Infusing   Arterial Lumen Status Infusing   Line Care Connections checked and tightened   Dressing Type Transparent;Bacteriocidal   Date of Last Dressing Change 24   Dressing Status Clean, dry & intact   Dressing Change Due 24     Primary RN SBAR: GIL Cole RN  Patient Education provided: intubated/sedated  Preferred Education method and Primary language: Verbal/English    Hepatitis B Surface Ag   Date/Time Value Ref Range Status   2024 01:19 AM <0.10 Index Final     Hep B S Ab   Date/Time Value Ref Range Status   2024 10:09 AM <3.10 mIU/mL Final     BP - 100/38  HR - 109  RR - 25  SPO2 - 100%    [id number] filter running well with no indication for change at this time. Consents, patient, code status, labs and orders verified. LIJ temporary CVC, dressing CDI with bio-patch dated 2024. No signs of redness, drainage, or infection visualized. Lines visible and connections secure with blood warmer to return line at 37*C.

## 2024-04-24 NOTE — PROGRESS NOTES
[address], [facility] [place] [address]  Phone: [phone]   [phone]    [email or web address]     Nephrology Progress Note    Patient Name : Galilea Aguilar      : 1955     MRN : [id number]  Date of Admission : 2024  Date of Servive : 24    CC:  Follow up for ESRD       Assessment and Plan   ESRD on PD:  - holding PD due to being critically ill   - ALEX tubbs and started CRRT on    - continue CVVH with net even if possible   - labs Q12h    Lytes :   - Hypo K/ HypoMg/ Hypoalbuminemia - 2/2 malnutrition   - consider TF    Sepsis w/ shock   Lactic acidosis : impaired hepatic clearance   - CT concerning Emphysematous Cystitis +/- Pyleo   - Blood Cx : Enterobacter and Ecoli +ve   - abx per CCM     Cirrhosis w/ Hyperammonemia   CVA MRI --> old lacunar infarcts w/ surrounding hemorrhage   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 Survived the night, was made DNR by family, reduced pressor need, lactate trending down, BG still low and needing D10    Review of Systems: Review of systems not obtained due to patient factors.    Current Medications:   Current Facility-Administered Medications   Medication Dose Route Frequency    ceFEPIme (MAXIPIME) 2,000 mg in sodium chloride 0.9 % 100 mL IVPB ([id number])  2,000 mg IntraVENous Q12H    0.9 % sodium chloride infusion   IntraVENous PRN    pantoprazole (PROTONIX) 40 mg in sodium chloride (PF) 0.9 % 10 mL injection  40 mg IntraVENous Daily    vancomycin (VANCOCIN) 1,000 mg in sodium chloride 0.9 % 250 mL IVPB ([id number])  1,000 mg IntraVENous Q24H    insulin lispro (HUMALOG) injection vial 0-4 Units  0-4 Units SubCUTAneous Q4H    EPINEPHrine 10 mg in sodium chloride 0.9 % 250 mL infusion  1-20 mcg/min IntraVENous Continuous  04/24/24 0600 04/24/24 0700 04/24/24 0800 04/24/24 0900   BP:       Pulse: (!) 108 (!) 109 (!) 108 (!) 106   Resp: 22 23 22 23   Temp:  98.6 °F (37 °C) 99.2 °F (37.3 °C)    TempSrc:  Oral Oral    SpO2: 100% 99% 99% 100%   Weight:       Height:         Intake and Output:  04/24 0701 - 04/24 1900  In: 342.2 [I.V.:342.2]  Out: 100.2   04/22 1901 - 04/24 0700  In: 9939.8 [I.V.:3663.5]  Out: 482 [Urine:100]    Physical Examination:  General: On vent   Neck:  Supple, no mass  Resp:  Diminished B/L, no wheezing , normal respiratory effort  CV:  RRR,  no murmur or rub, LE edema  GI:  Soft, NT, + BS, PD cath exit site clean  Neurologic:  sedated  Dialysis access : ALEX tubbs    []    High complexity decision making was performed  []    Patient is at high-risk of decompensation with multiple organ involvement    Lab Data Personally Reviewed: I have reviewed all the pertinent labs, microbiology data and radiology studies during assessment.    Labs:  Recent Labs     04/23/24  1243 04/23/24 2228 04/24/24  0423    144 146*   K 4.2 4.1 4.0    107 107   CO2 9* 10* 14*   GLUCOSE 109* 147* 109*   BUN 18 14 12   CREATININE 5.69* 4.25* 3.36*   CALCIUM 7.8* 6.8* 6.8*         Recent Labs     04/23/24  0008 04/23/24  1243 04/23/24  2228 04/24/24  0618   WBC 5.0  --  10.1  --    RBC 2.53*  --  2.48*  --    HGB 7.4* 7.5* 7.2* 9.5*   HCT 24.3* 24.6* 23.4* 29.9*   MCV 96.0  --  94.4  --    MCH 29.2  --  29.0  --    MCHC 30.5  --  30.8  --    RDW 18.6*  --  19.0*  --    *  --  68*  --    MPV 9.9  --  12.1  --        Recent Labs     04/23/24  0008 04/23/24  2228   GLOB 3.5 2.7       Recent Labs     04/23/24  0008   INR 1.4*   APTT 43.9*        No results for input(s): \"CPK\", \"CKMB\", \"TROPONINI\" in the last 72 hours.    Invalid input(s): \"B-NP\"  Invalid input(s): \"PHI\", \"PCO2I\", \"PO2I\", \"FIO2I\"     Ventilator:       Microbiology:  No results found for: \"SDES\"  No components found for: \"CULT\"      I have reviewed the

## 2024-04-24 NOTE — ACP (ADVANCE CARE PLANNING)
Critical Care Advanced Care Planning    Diagnosis:  Gram-negative quinn bacteremia  Acute cystitis  Acute on chronic renal failure  Severe metabolic acidosis  Acute hypoxic respiratory failure, aspiration   Diabetes type 2 with hypoglycemia  Asthma COPD    Advance Medical Directive:  []Yes     [x]No    Medical POA or Next of Kin   Name:  Chaz Aguilar- daughter [phone]  Mak Aguilar - son [phone]    Resuscitation status: DNR  Date addressed/confirmed: 4/24/24  If DNR, Durable DNR completed:  []Yes     [x]No    Goals of Care:    Patient not progressing continue to decompensate.  On 3 vasopressors, bicarb drip, on CRRT intubated on mechanical ventilation.  UTI and bacteremia.  Broaden antibiotics.    Called and spoke with family members patient's 3 children regarding patient current status and critical state.  Discussed patient's progression and current septic shock state and difficulty supporting hemodynamics.  Discussed the fact that patient had a high chance of mortality and a high risk of going into cardiac arrest in the next 24 hours.  Received consent for blood transfusion again.  At this juncture after all questions were answered to satisfaction family has decided to make patient DNR and to continue all treatments aggressively at this time.    Total Time:  >50% of time in counseling / coordination? Yes      BOYD Mcfadden

## 2024-04-24 NOTE — PROGRESS NOTES
[facility] Daily Progress Note.      Name: Galilea Aguilar   : 1955   MRN: [id number]   Date: 2024        Chief Complaint   Patient presents with    Nausea         HPI:     67 yo F admitted from home HD6 to IM for AMS, orthostatic hypotension and lactic acidosis. Has been treated for hypotension with IVF and midodrine, IM also resumed coreg d/t tachycardia. Her AMS cleared and Head cT demonstrated no acute finding. Fever work up negative with blood cx and PD fluid and UA.     RRT on  for hypotension. Neuro intact. IVF, Abx and NE gtt started. Non con CT chest and abdomen done. Transferred to ICU for cont work up and Mx.    On  in the afternoon pt acutely decompensated and was emergently intubated, received central line, arterial line and dialysis catheter.    Active Problems Being Managed:   Septic shock  Acute respiratory failure  ZEN  Lactic acidosis  Hypoglycemia  ESRD      Assessment/Plan:     Neuro:  - tried to add propofol for sedation but dropped BP so switched to precedex  - goal is to manage pain and keep sedated while on high levels of pressors    CVS:  - pt is on levophed, epi and vaso to keep MAP > 60  - pressor requirement increased during the day.  - also on stress dose steroids.  - spoke with family at bedside and will continue to excalate pressors so patient;sister and son can hopefully make it to visit the patient.    Pulm:  - reviewed vent settings; no changes other than increasing sedation for patient-vent synchrony.  - cont prn nebs    GI:  - on PPI  - no tube feeds due to high pressor requirement.    Renal:  - on CVVHD  - discussed case with Nephrology at bedside. Dialysis should help with pt clearing toxins. Prognosis overall still poor.  - continue bicarb drip    Endo:  - BS goal     ID:  - cont abx  - lactate elevated but starting to clear down to 18.4 from 20    Heme:  - thrombocytopenia with platelets down to 28. Most likely secondary to sepsis. Holding

## 2024-04-24 NOTE — PROGRESS NOTES
[id number] Lactic 20.4, BS 43, repeat 33, Dr. Nugent at bedside to assess. Orders placed to intubate and line patient. See MAR to administration of intubation medications. During intubation patient had copious emesis, and noted to hypotensive and bradycardic. See MAR for administration of EPI, Bicarb, D50, and drip titrations. Due to difficulty getting 02 sats, patient was bronched by Dr. Nugent, ETT confirmed placement and gastric contents suctioned out of lungs. Patient subsequently lined for CRRT, and had femoral A line placed. X RAY confirmed ETT and HD cath placement. Once stable, went for CT scan.    1900 Alma MARRERO with [facility] connected patient to CRRT.

## 2024-04-24 NOTE — PROGRESS NOTES
ICU NIGHT CHECKLIST     Night round completed with bedside nurse.   Plan of care for patient reviewed.   Medication weaning / changes discussed.  Necessity of any lines, drains, and/or tubes addressed.   Respiratory status / modalities discussed.     Bicarb gtt to 100 ml/hr and gave total 5 amp pushes overnight for persistent metabolic acidosis.   Levophed: 40 mcg/min  Vasopressin: 0.04 units/min  Epinephrine: 16 mcg/min  Fentanyl: 25 mcg/hr  All drips to be concentrated.   Will give 1 unit PRBCs tonight. Last Hbg 7.2 in severe shock on CRRT.  CRRT running net positive with no volume pull at beginning of shift/start of CRRT.   Crackles on exam note. Chest xray this am  Slow pull 25 ml/hr --> 50 ml/hr as tolerated. Will increase as patient tolerates to net negative goal.   DC D5/NS - need to decrease volume. If becomes hypoglycemic will add D10 % drip.  Q 4 hrs glucose checks  ABGs q 4 hrs    ACP discussion with family via phone. See note for details. Patient is now DNR. Continue all treatments.    Critical care time 60 mins    Darell Hoffmann, NP-C    Critical Care Medicine  [facility]

## 2024-04-24 NOTE — PROGRESS NOTES
2000- Jovanna SHARMA at bedside. Confirmed goal of MAP >60 for pressors and to CRRT with PFR 0 until pressor requirements decrease. Order to d/c Q2 lactic and verbal order for q6 lactic. Informed MD of fixed pupils. No new orders received. Continuing to monitor with pupillometer.    2100 - Rounds completed with Darell GARCIA. Lab orders placed as well as discussion of plan of care.Order to hold heparin, PO singular, and PO bicarb.

## 2024-04-24 NOTE — PROGRESS NOTES
I participated in the IDT meeting where the plan of care for Galilea Aguilar was discussed on [facility] 7S2 INTENSIVE CARE. I reviewed the patient's medical record prior to this meeting.    We will continue to follow and assess for spiritual/emotional support during this hospitalization.    Please contact  paging service for any immediate needs.      _____________________________  Margy Hastings M.Div., M.S., B.C.C.  Staff [facility]  Spiritual Health Services

## 2024-04-25 PROBLEM — R06.02 SHORTNESS OF BREATH: Status: ACTIVE | Noted: 2024-01-01

## 2024-04-25 PROBLEM — R53.81 DEBILITY: Status: ACTIVE | Noted: 2024-01-01

## 2024-04-25 PROBLEM — Z51.5 PALLIATIVE CARE ENCOUNTER: Status: ACTIVE | Noted: 2024-01-01

## 2024-04-25 NOTE — PROGRESS NOTES
[place] [phone] (COPE)  [facility] [phone] (COPE)    GOALS OF CARE:       TREATMENT PREFERENCES:   Code Status: DNR     Advance Care Planning:  [x] [facility] Interdisciplinary Team has updated the ACP Navigator with Health Care Agent and Patient Capacity    Primary Decision Maker (Health Care Agent):   Relationship to patient:  [] Named in a scanned document   [x] Legal Next of Kin  [] Guardian      Family Meeting Documentation    Participants: Dr. Oakes, Dr. Bailey, daughter Chaz, sons Aki and Piter     Psychosocial, Spiritual History: The patient has three children that live locally, Claritza, Aki, and Piter. The patient has 8 grandchildren, beloved by many. The patient's cousin Chuyita lives locally and is also very close to the patient. Patient was living with her sons prior to hospitalization. She is a women of filemon and filemon is important to patient and family. The patient's daughter Claritza is also struggling however bec    Discussion: The Palliative Medicine SW and Dr. Oakes met with the patient's three children, Chaz, Aki, Piter, and briefly Dr. Bailey-     We discuss that the patient is critically ill and this is not a survivable hospital stay. Unfortunately, the patient is in septic shock, 3 pressors, significant ventilation/maximum ventilation support and is not making gains.     Family has already made decision to change code status to DNR and no escalation of care.     We discuss that the patient is not improving despite all of the measures we are providing. She is going to die regardless of the care that we do, and we introduce the option of focusing on comfort solely- we discuss removal of ventilator support, stopping CRRT, and live prolonging measures that are not helping her at this point. Aki speaks up that he does not want to see her suffer- \"want comfort\" for their mother, Aki and Nicolalaura do majority of talking during meeting- Piter is the youngest and  appropriately emotional and tearful throughout encounter, however, it is clear all three are in agreement with plan of care.     This is incredibly difficult for family because although she had recent hospital stays and chronic conditions- this is a drastic decline, she was still able to do ADLs with assistance and talking with family even yesterday.     We discuss family visiting- the patient has multiple grandchildren, family plans on calling other family members who may want to come see her or say something on the phone to her ear- they will let ICU RN know when they have decided on timing of transitioning to comfort, however, we make family aware she is at high risk for sudden event and decline at anytime with how sick she is.     Chaz also in distress because her 17-year-old daughter is currently admitted to [facility], pending TDO to psych unit- she shares how difficult it is not being able to get her here to [facility] to visit, she asks if we have a pediatric psych unit- to this writer's knowledge, only adult IP psych unit here. SW did provide letter to Chaz stating to her daughter's care team that her grandmother is critically ill and actively dying so she can get as much support as possible.     When family is ready- change to comfort measures only, see Dr. Oakes' note for additional information and instruction.     This writer communicated with Spiritual Care- please page them when all family arrives.     SW also provided resources to family on how to talk with children about death and dying, grief resources for children (patient has multiple grandchildren with ranges in ages- oldest 24, one on the way). SW also provided legacy items to family, heart rhythm strips of their loved one.     Palliative Medicine following with you.     Outcome / Plan: DNR, anticipate transition to comfort focused care when family arrives and when family ready.       Fernanda Fairbanks, MARIBETH

## 2024-04-25 NOTE — PROGRESS NOTES
1138: BS 67; MD Leo notified- order received to increase D10 to 50ml/hr.     1137: Crititcal value received- Platelets 4; MD Leo notified and no new orders received.     1246: Critical value received- CO2 11; MD Leo notified and no new orders received.     1745: CRRT clotted off and stopped; unable to rinse blood back. Miguelangel RN paged.     1915: Decision made by family to transition to comfort care measures only. Pt compassionately extubated to room air with RT at bedside. Vasopressors stopped.

## 2024-04-25 NOTE — PROGRESS NOTES
I visited Galilea Aguilar at request of palliative RICHIE Fairbanks. The patient is on vent support and the family has decided on comfort care for her. At bedside were her son, daughter and one brother. All were processing the anticipatory grief incumbent to such a loss.   I requested her nurse to page spiritual health when the family arrives in full and the transition to comfort care is ready to begin.    Chaplain Radha, Ismael, MS, BCC

## 2024-04-25 NOTE — FLOWSHEET NOTE
Primary RN SBAR: GIL Cole RN  Patient Education provided: Pt intubated/sedated  Preferred Education method and Primary language: BRIAN  Hospital associated wait time; reason: none  Hepatitis B Surface Ag   Date/Time Value Ref Range Status   04/18/2024 01:19 AM <0.10 Index Final     Hep B S Ab   Date/Time Value Ref Range Status   04/16/2024 10:09 AM <3.10 mIU/mL Final       04/25/24 1130   Vital Signs   Pulse 93   Respirations 22   SpO2 99 %   Pain Assessment   Pain Assessment Critical Care Pain Observation Tool (CPOT)   Pain Level 0   Observations & Evaluations   Level of Consciousness 2   Oriented X BRIAN   Heart Rhythm Regular  (ICU monitoring)   Respiratory Quality/Effort Unlabored   O2 Device Ventilator   Skin Color Mottled (comment);Dusky   Skin Condition/Temp Dry;Cool   Abdomen Inspection Obese;Soft   Edema Generalized   Edema Generalized Trace   Technical Checks   All Connections Secure Yes   ICEBOAT I;C;E;B;O;A;T   Machine Alarm Self Test Completed;Passed        04/25/24 1130   Treatment   $CRRT $Yes   Machine #   ([id number])   Cartridge Lot #   ([id number])   Therapy Type CVVH   Pressures   Access (mmHg) (!) -25 mmHg   Return/Venous (mmHg) 52 mmHg   Effluent (mmHg) (!) 0 mmHg   Filter (mmHg) 127 mmHg   TMP Pressure (mmHg) 77 mmHg   Pressure Drop (mmHg) 39 mmHg   Deaeration Chamber Check Yes   Flow Rates   Therapy Fluid (L/hr) 2.4 L/hr   Blood Flow (mL/min) 200 mL/min   Replacement Fluid Pre-Filter (mL/hr) 600 mL/hr   Replacement Filter Post-Filter (mL/hr) 600 mL/hr   Pre-Blood Pump (mL/hr) 1200 mL/hr   System Used   System Used Frances   Frances Calculation   (D) Physician Ordered Hourly Removal (mL) 0 ml   CRRT Activities   Intervention Ongoing  (Daily rounding)   Ultrafiltrate Assessment   Ultrafiltrate Color Yellow/straw   Ultrafiltrate Appearance Clear   Hemodialysis Central Access Left Neck   Placement Date/Time: 04/23/24 1730   Present on Admission/Arrival: No  Orientation: Left  Access Location: Neck    Continued need for line? Yes   Site Assessment Clean, dry & intact   Venous Lumen Status Infusing   Arterial Lumen Status Infusing   Line Care Connections checked and tightened   Dressing Type Sterile dressing, transparent;Bacteriocidal   Date of Last Dressing Change 04/23/24   Dressing Status Clean, dry & intact   Dressing Change Due 04/26/24     Orders, labs, notes and code status reviewed. [id number] running well with no indication for filter change. All lines visible and secure with thermax blood warmer set @ 37*C. Education and pre/post report with primary RN.

## 2024-04-25 NOTE — CONSULTS
Palliative Medicine  Patient Name: Galilea Aguilar  YOB: 1955  MRN: [id number]  Age: 68 y.o.  Gender: female    Date of Initial Consult: 4/25/24  Date of Service: 4/25/2024  Time: 3:20 PM  Provider: Lexii Oakes MD  Hospital Day: 10  Admit Date: 4/16/2024  Referring Provider: Dr Bailey        Reasons for Consultation:  Goals of Care    HISTORY OF PRESENT ILLNESS (HPI):   Galilea Aguilar is a 68 y.o. female with a past medical history of ESRD on PD, CAD s/p CABG 9/2023,  COPD, HTN, HLD, known large RP mass since 2021, hx of mult recurrent small CVAs (12/2023),  hx of peritonitis who was admitted on 4/16/2024 from home w/ altered mental status. Head CT, UA neg. Mental status soon improved back to baseline of alert/oriented x 4 after admission, then started having visual hallucinations (has occurred during previous admissions). Also w/ severe orthostatic hypotension. ICU consulted 4/22- hypotensive, febrile to 102, L flank pain- tx to ICU for septic shock.CT AP showing new gas w/in wall of bladder- consistent w/ emphysematous cystitis/pyelonephritis.     Intubated 4/23, decision for DNR status by all 3 children made 4/24. On mult pressors, requiring CRRT, vent support 100% FIO2, PEEP 8. Bcx 4/22, 4/23 and Ucx 4/23 w/ ecoli. Plts 4000.    Psychosocial: Pt has 3 children-  dtr Shajuana., sons Aki and Piter, She lives w/ Aki and Piter and their families. Pt has 8 grandchildren. Cousin Chuyita lives locally and they are close. She was doing ADLs but required assistance.       PALLIATIVE DIAGNOSES:    Septic shock  Shortness of breath   Debility   Palliative care encounter     ASSESSMENT AND PLAN:   Along w/ Fernanda ARRIAGAW discuss pt are w/ Dr Garner and Sally RN in ICU. Pt in septic shock, on 3 pressors, significant vent support and is not making gains.   Decisions recently include changing code status to DNR, and yest decision not to escalate care. Acute medical conditions explained in detail.   Along w/   [phone]  Confirm Advance Directive: None    Current Code Status: DNR     Goals of Care: Goals of Care and Interventions  Patient/Health Care Proxy Stated Goals: Comfort       Please refer to Palliative Medicine ACP notes for further details.    PALLIATIVE ASSESSMENT:      Palliative Performance Scale (PPS):  PPS: 10    Pt on vent, not interactive.     Modified ESAS:  Modified-[place] Symptom Assessment Scale (ESAS)  Pain Score: No pain  Dyspnea Score: No shortness of breath    Clinical Pain Assessment (nonverbal scale for severity on nonverbal patients):   Clinical Pain Assessment  Severity: 0           Vital Signs: Blood pressure (!) 109/42, pulse 81, temperature 97.8 °F (36.6 °C), temperature source Oral, resp. rate 19, height 1.6 m (5' 2.99\"), weight 92.1 kg (203 lb 0.7 oz), SpO2 98 %.    PHYSICAL ASSESSMENT:   General: [] Oriented x3  [] Well appearing  [x] Intubated  []Ill appearing  []Other:  Mental Status: [] Normal mental status exam  [] Drowsy  [] Confused  [x]Other:sedated   Cardiovascular: [] Regular rate/rhythm  [] Arrhythmia  [] Other:  Chest: [] Effort normal  []Lungs clear  [] Respiratory distress  []Tachypnea  [x] Other:vent   Abdomen: [] Soft/non-tender  [] Normal appearance  [] Distended  [] Ascites  [] Other:  Neurological: [] Normal speech  [] Normal sensation  []Deficits present:  Extremity: [] Normal skin color/temp  [] Clubbing/cyanosis  [] No edema  [x] Other: cool hands/feet     Wt Readings from Last 15 Encounters:   04/25/24 92.1 kg (203 lb 0.7 oz)   03/19/24 73.9 kg (163 lb)   03/16/24 73.9 kg (162 lb 14.7 oz)   02/15/24 72 kg (158 lb 12.8 oz)   02/15/24 72 kg (158 lb 12.8 oz)   02/03/24 78.1 kg (172 lb 2.9 oz)   01/04/24 77.6 kg (171 lb)   01/04/24 77.6 kg (171 lb)   01/02/24 77.6 kg (171 lb 1.6 oz)   12/28/23 75.3 kg (166 lb)   12/22/23 70.8 kg (156 lb)   12/15/23 70.4 kg (155 lb 1.6 oz)   12/14/23 73.9 kg (163 lb)   11/07/23 74.8 kg (164 lb 14.5 oz)   10/31/23 71 kg (156 lb 9.6

## 2024-04-25 NOTE — PROGRESS NOTES
Physical Therapy  4/25/2024    Chart reviewed, patient remains on CRRT. sedated, on pressor and vent support. Per ABCDEF protocol, will work with patient when PEEP is 10.0 or less, FIO2 60% or less, and patient is following basic commands. Will follow patient peripherally.     Recommend nursing to complete with patient, as able and per protocol, in order to promote cardiopulmonary systems, maintain strength, endurance and independence:   -bed in chair position or maximize full reverse Trendelenburg position to facilitate upright activity with foot board and non-skid footwear on 3x/day ~30-60 mins each   -ROM during bathing B UEs and LEs  -positioning to prevent contractures and edema  RASS -1/0/+1 (during SAT) Active ROM  Sitting (bed in chair position)  Standing (reverse Trendelenburg)  ADLs   RASS -3/2 Passive ROM  Sit (bed in chair position)   RASS -5/-4 Passive ROM       Thank you for your assistance.   Malika Singh, PT, DPT

## 2024-04-25 NOTE — PROGRESS NOTES
Day #4 of Vancomycin - Dosing Update  Indication:  Septic shock with E.coli bacteremia - likely 2/2 urinary source, possible intra-abdominal process (PD patient) vs other etiology  Current regimen:  1000 mg IV Q 24 hr  Abx regimen:  Cefepime + Metronidazole + Vancomycin  ID Following ?: NO  Concomitant nephrotoxic drugs (requires more frequent monitoring): Vasopressors  Frequency of BMP?: Every 12 hours    Recent Labs     24  1044 24  2210 24  1103   WBC 4.9 3.8 12.3*   CREATININE 2.83* 2.12* 1.56*   BUN 11 8 6     Est CrCl: CVVH  Temp (24hrs), Av.8 °F (37.1 °C), Min:97.8 °F (36.6 °C), Max:99.7 °F (37.6 °C)    Cultures:    Blood: NG, final   Blood: E.coli in 2/2 bottles (pan-S), final   Blood: E.coli in 4/4 bottles (pan-S), final   Urine: >100k E.coli, final   PD fluid: NGTD    MRSA Swab ordered (if applicable)? NO    Goal target range Trough 10-15 mcg/mL    Recent level history:  Date/Time Dose & Interval Measured Level (mcg/mL) Associated AUC/EMERSON Dose Adjustment     @ 0257 1000 mg IV Q24H 10.6 mcg/mL (~24 hrs post-dose) -- Change to 1250 mg IV Q24H                                         Plan: The vancomycin trough level drawn this morning was 10.6 mcg/mL, which is at the low-end of the goal range.  Plan will be to adjust the dose to 1250 mg IV Q 24 hr for an estimated trough closer to 12-15 mcg/mL.  Pharmacy will continue to monitor patient daily and will make dosage adjustments based upon changing renal function.

## 2024-04-25 NOTE — PROGRESS NOTES
Narrative: While rounding I, the  provided spiritual care to the patient's cousin Ms. Chuyita Benson. Upon my arrival, the patient's family member was at bedside and on the phone with family.While Ms. Chuyita Benson, was providing support, I introduced myself to her. I, provided an empathetic presence , supportive listening, and engaged in conversation. The family member was reflecting on Ms. Galilea Aguilar's life. She expressed joyful and meaningful memories that she will always remember. Ms. Chuyita Benson, expressed that the spiritual care visit was meaningful as it gave her opportunity to reflect on the life of her loved-one.     We will continue to follow and assess for spiritual/emotional support during this hospitalization.    Please contact  paging service for any immediate needs.      _____________________________  Kylie Perez M.Div.   Chaplain Resident

## 2024-04-25 NOTE — PROGRESS NOTES
[facility] Daily Progress Note.      Name: Galilea Aguilar   : 1955   MRN: [id number]   Date: 2024        Chief Complaint   Patient presents with    Nausea         HPI:     69 yo F admitted from home HD6 to IM for AMS, orthostatic hypotension and lactic acidosis. Has been treated for hypotension with IVF and midodrine, IM also resumed coreg d/t tachycardia. Her AMS cleared and Head cT demonstrated no acute finding. Fever work up negative with blood cx and PD fluid and UA.     RRT on  for hypotension. Neuro intact. IVF, Abx and NE gtt started. Non con CT chest and abdomen done. Transferred to ICU for cont work up and Mx.    On  in the afternoon pt acutely decompensated and was emergently intubated, received central line, arterial line and dialysis catheter.    Active Problems Being Managed:   Septic shock  Acute respiratory failure  ZEN  Lactic acidosis  Hypoglycemia  ESRD      Assessment/Plan:     No acute events overnight. Pt remains on high dose pressors without any escalation in dose as discussed with family yesterday. However, clinically showing signs of vasoconstriction in digits of hands and feet. Platelets also continued to fall to under 10K. Prognosis is poor. Family meeting held this afternoon with Palliative care to make patient     Neuro:  - continue sedation  - goal is to manage pain and keep sedated while on high levels of pressors    CVS:  - pt is on levophed, epi and vaso to keep MAP > 60  - also on stress dose steroids.  - spoke with family at bedside and plan is for no excalation of pressors.     Pulm:  - reviewed vent settings; no changes other than increasing sedation for patient-vent synchrony.  - cont prn nebs    GI:  - on PPI  - no tube feeds due to high pressor requirement.    Renal:  - on CVVHD  - discussed case with Nephrology at bedside. Dialysis should help with pt clearing toxins. Prognosis overall still poor.  - continue bicarb drip    Endo:  - BS goal  plan and management of the patient's care with the consulting services, the bedside nurses and the respiratory therapist.      NOTE OF PERSONAL INVOLVEMENT IN CARE   This patient has a high probability of imminent, clinically significant deterioration, which requires the highest level of preparedness to intervene urgently. I participated in the decision-making and personally managed or directed the management of the following life and organ supporting interventions that required my frequent assessment to treat or prevent imminent deterioration.        Neal Bailey MD   Critical Care Medicine  [facility]  [phone]  4/25/2024

## 2024-04-25 NOTE — PROGRESS NOTES
2050 - spoke with Carlos MARRERO from [facility] about concerns with CRRT filter change. Concerned about BP drop when hooking back up to CRRT. Elected to proceed with filter change as there was a clot in deaeration chamber. Pt tolerated CRRT restart with PFR of 0. Slowly titrated PFR back to 200 as tolerated.     2330 - Per Bret GARCIA no lactic or ABG needed overnight.

## 2024-04-25 NOTE — PROGRESS NOTES
Occupational Therapy  4/25/2024    Chart reviewed, patient remains on CRRT. sedated, on pressor and vent support. Per ABCDEF protocol, will work with patient when PEEP is 10.0 or less, FIO2 60% or less, and patient is following basic commands. Will follow patient peripherally.     Recommend nursing to complete with patient, as able and per protocol, in order to promote cardiopulmonary systems, maintain strength, endurance and independence:   -bed in chair position or maximize full reverse Trendelenburg position to facilitate upright activity with foot board and non-skid footwear on 3x/day ~30-60 mins each   -ROM during bathing B UEs and LEs  -positioning to prevent contractures and edema  RASS -1/0/+1 (during SAT) Active ROM  Sitting (bed in chair position)  Standing (reverse Trendelenburg)  ADLs   RASS -3/2 Passive ROM  Sit (bed in chair position)   RASS -5/-4 Passive ROM       Thank you for your assistance.   JULIO CÉSAR Kang, OTR/L

## 2024-04-25 NOTE — PROGRESS NOTES
[facility] [id number] [facility], [address] [place] [address]  Phone: [phone]   [phone]    [email or web address]     Nephrology Progress Note    Patient Name : Galilea Aguilar      : 1955     MRN : [id number]  Date of Admission : 2024  Date of Servive : 24    CC:  Follow up for ESRD       Assessment and Plan   ESRD on PD:  - holding PD due to being critically ill   - ALEX tubbs and started CRRT on    - continue CVVH with net even if possible   - transition to comfort care today if family ready --> its futile care at this time   - labs Q12h    Lytes :   - Hypo K/ HypoMg/ Hypoalbuminemia - 2/2 malnutrition   - consider TF    Sepsis w/ shock   Lactic acidosis : impaired hepatic clearance   - CT concerning Emphysematous Cystitis +/- Pyleo   - Blood Cx : Enterobacter and Ecoli +ve   - abx per CCM     Cirrhosis w/ Hyperammonemia   CVA MRI --> old lacunar infarcts w/ surrounding hemorrhage   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 Continued decline. Plt now 10k , no active bleeding, no escalation of care now, not trending lactate anymore      Review of Systems: Review of systems not obtained due to patient factors.    Current Medications:   Current Facility-Administered Medications   Medication Dose Route Frequency    ceFEPIme (MAXIPIME) 2,000 mg in sodium chloride 0.9 % 100 mL IVPB ([id number])  2,000 mg IntraVENous Q12H    0.9 % sodium chloride infusion   IntraVENous PRN    pantoprazole (PROTONIX) 40 mg in sodium chloride (PF) 0.9 % 10 mL injection  40 mg IntraVENous Daily    vancomycin (VANCOCIN) 1,000 mg in sodium chloride 0.9 % 250 mL IVPB ([id number])  1,000 mg IntraVENous Q24H    insulin lispro (HUMALOG) injection vial 0-4 Units  0-4 Units SubCUTAneous Q4H    EPINEPHrine 10 mg

## 2024-04-25 NOTE — FLOWSHEET NOTE
CRRT / [phone]    Primary RN SBAR: Sumanth, RN and Ginger RN  Patient Education provided: pt unable to educate - intubated/sedated, access/site care + CRRT modality education provided to pt son at bedside  Preferred Education method and Primary language: unable to assess - intubated/sedated    Hepatitis B Surface Ag   Date/Time Value Ref Range Status   04/18/2024 01:19 AM <0.10 Index Final     Hep B S Ab   Date/Time Value Ref Range Status   04/16/2024 10:09 AM <3.10 mIU/mL Final       04/24/24 2100   Treatment   $CRRT $Yes   Machine #   ([id number])   Cartridge Lot #   ([id number])   Therapy Type CVVH   Pressures   Access (mmHg) (!) -30 mmHg   Return/Venous (mmHg) 50 mmHg   Filter (mmHg) 131 mmHg   TMP Pressure (mmHg) 25 mmHg   Pressure Drop (mmHg) 43 mmHg   Deaeration Chamber Check Yes   Flow Rates   Therapy Fluid (L/hr) 2.4 L/hr   Blood Flow (mL/min) 200 mL/min   Replacement Fluid Pre-Filter (mL/hr) 600 mL/hr   Replacement Filter Post-Filter (mL/hr) 600 mL/hr   Pre-Blood Pump (mL/hr) 1200 mL/hr   System Used   System Used Frances   Frances Calculation   (D) Physician Ordered Hourly Removal (mL) 0 ml   CRRT Activities   Intervention Initiated   Hemodialysis Central Access Left Neck   Placement Date/Time: 04/23/24 1730   Present on Admission/Arrival: No  Orientation: Left  Access Location: Neck   Continued need for line? Yes   Site Assessment Clean, dry & intact   Venous Lumen Status Infusing   Arterial Lumen Status Infusing   Line Care Connections checked and tightened;Ports disinfected   Dressing Type Bacteriocidal;Transparent   Date of Last Dressing Change 04/23/24   Dressing Status Clean, dry & intact   Dressing Change Due 04/26/24     Miguelangel MARRERO at bedside to change filter r/t large clot in deaeration chamber, rising filter pressures. Patient, code status, labs, and orders verified. Consents on chart. Time out completed. Miguelangel MARRERO able to return all pt blood from circuit (186 mls.) Old set discarded in red

## 2024-04-26 NOTE — DEATH NOTES
Death Pronouncement Note  Patient's Name: Galilea Aguilar   Patient's YOB: 1955  MRN Number: [id number]    Admitting Provider: Bobby Mooney MD  Attending Provider: Neal Bailey MD    Patient was examined and the following were absent: Pulses, Blood Pressure, and Respiratory effort    I declared the patient dead on 4/25/2024 at 7:25 PM    Preliminary Cause of Death: Sepsis     Electronically signed by ADYA Esquivel NP on 4/25/24 at 8:04 PM EDT   [FreeTextEntry1] : Improved acid reflux complaints with addition of ranitidine at bedtime, continue omeprazole\par \par Plan\par Strong recommendation for continued efforts at weight loss\par Continue current medications\par No warning signs at this time to suggest need for upper endoscopy\par Office visit in 4 months

## 2024-04-26 NOTE — PROGRESS NOTES
initiated visit to Galilea Aguilar in [facility] 7S2 INTENSIVE CARE in response to notification of death. Reviewed the patient's medical record prior to this encounter. Family was present. Visited with family in waiting, in consult room, and in Ms. Aguilar's room.  Provided prayer per family request.  Provided ministry of presence.  Initiated life review.  Family shared that family was what mattered most to Ms. Aguilar.  They shared how meaningful it was that many family members were able to travel to this hospital and share their goodbyes.  Provided active listening and affirmed their feelings. Family appeared to experience comfort from spiritual health visit.  remains available for family support upon further referral. Please page at [phone].    Rev. Unique Thompson MDiv, MSW  Chaplain Resident

## 2024-04-26 NOTE — PROGRESS NOTES
1930: RN received report from Sally MARRERO. Patient was made comfort care, extubated at 191 and T.O.D. was declared at 192.  paged, supervisors notified, and [employer] notified per protocol. Family remains at bedside and understand this RN is available as needed.  home information obtained from patient's daughter and YSABEL Ware.

## 2024-04-26 NOTE — PROGRESS NOTES
Patient death recorded on the [facility] internal restraint log on 04/25/2024 date at 2353 time.

## 2024-04-27 LAB
BACTERIA SPEC CULT: NORMAL
GRAM STN SPEC: NORMAL
SERVICE CMNT-IMP: NORMAL

## 2024-04-27 NOTE — DISCHARGE SUMMARY
Discharge Summary     Patient: Galilea Aguilar       MRN: [id number]       YOB: 1955       Age: 68 y.o.     Date of admission:  2024    Date of discharge:  2024    Primary care provider:  Chapincito Oakes MD     Admitting provider:  Bobby Mooney MD    Discharging provider(s): Neal Bailey MD - Staff Intensivist - [facility]     Consultations  IP CONSULT TO NEPHROLOGY  IP CONSULT TO PHARMACY  IP CONSULT TO CASE MANAGEMENT  IP CONSULT TO CASE MANAGEMENT  IP CONSULT TO PHARMACY  IP CONSULT TO INTENSIVIST  IP CONSULT TO PHARMACY  IP CONSULT TO PHARMACY  IP CONSULT TO PALLIATIVE CARE    Procedures  Intubation  Central line  Arterial line  Dialysis catheter  CVVHD    Discharge Condition: .  [place] [facility] [facility]  The patient is stable for discharge.  Pronounced: 24 at 19:25    Admission diagnosis  Hyperkalemia [E87.5]  Lactic acidosis [E87.20]  ESRD on peritoneal dialysis (HCC) [N18.6, Z99.2]  Altered mental status, unspecified altered mental status type [R41.82]  AMS (altered mental status) [R41.82]    Please refer to the admission history and physical for details on the presenting problem.     Final discharge diagnoses and brief hospital course    Septic shock  Acute respiratory failure  ZEN  Lactic acidosis  Hypoglycemia  ESRD    68/ yo female admitted from home for AMS, orthostatic hypotension and lactic acidosis. She was treated and had clinical improvement. CT Head demonstrated no acute finding and  Cultures grew Ecoli for which she was treated.  On  patient had RRT called for deterioration that presented as hypotension. Transferred to ICU for continued work up and treatment and on  patient acutely decompensated and was emergently intubated, received central line, arterial line and dialysis catheter. Patient continued to deteriorate clinically. Family was informed and made patient DNR. Patient was pronounced on 24 at  19:25          Physical examination at discharge  Vitals:    04/25/24 1925   BP:    Pulse: (!) 0   Resp: (!) 0   Temp:    SpO2:           Recent Labs     04/24/24 2210 04/25/24  1103   WBC 3.8 12.3*   HGB 7.6* 7.0*   HCT 23.4* 22.7*   PLT 10* 4*     Recent Labs     04/24/24 2210 04/25/24  0410 04/25/24  1103     --  137   K 4.1  --  4.3     --  100   CO2 15*  --  11*   BUN 8  --  6   MG  --  2.2  --    PHOS  --  2.1*  --      Recent Labs     04/24/24 2210 04/25/24  1103   GLOB 2.3 1.9*     No results for input(s): \"INR\", \"APTT\" in the last 72 hours.    Invalid input(s): \"PTP\"   No results for input(s): \"TIBC\", \"FERR\" in the last 72 hours.    Invalid input(s): \"FE\", \"PSAT\"   No results for input(s): \"PH\", \"PCO2\", \"PO2\" in the last 72 hours.  No results for input(s): \"CPK\", \"CKMB\", \"TROPONINI\" in the last 72 hours.  No components found for: \"GLPOC\"    Pertinent imaging studies:      ---------------------------------    Chronic Diagnoses:      Time spent on discharge related activities today greater than 30 minutes.      Signed:      Neal Bailey MD   Staff Intensivist  [facility]    4/27/2024   4:04 PM             Cc: Chapincito Oakes MD

## 2024-05-23 NOTE — PROGRESS NOTES
Physician Progress Note      PATIENT:               LULY WIGGINS  CSN #:                  [id number]  :                       1955  ADMIT DATE:       2024 2:42 AM  DISCH DATE:        2024 7:25 PM  RESPONDING  PROVIDER #:        Neal Bailey MD          QUERY TEXT:    I realize this case is almost 30d old, but was reviewing it again regarding   pt's AMS which appears to be the reason she was admitted.  It looks like she   was AOx3 in the H&P with some episodes of hallucinations.  If possible, please   document in the progress notes and discharge summary if AMS was:    The medical record reflects the following:  Risk Factors:  vomiting, diarrhea, ESRD    Clinical Indicators:    H&P :  Altered Mental Status  - chest Xray with no acute process  - CT head no acute intracranial process  - UA with no evidence of infection  - resolved, pt appears back to baseline - no neuro deficits and alert and   oriented  - no leukocytosis    Renal : conversant, AOx3  Hosp : pt seeing bugs, sees dgtr when not there; AOx3  Renal : denies hallucinations, alert & recognizes me, AOx3  Renal : AOx3  Hosp : AOx3 w/ no hallucinations  Renal : AOx3  Hosp : AOx3  Hosp : AMS resolved- etiol unclear    Intensivist :  AMS  -better this am  -waxes and wanes per chart review and Dr Mary who knows her well    Treatment: CT, XR    Thank you,  Sydnie Velasquez RN, BSN, CRCR, CCDS, SMART  Clinical Documentation Improvement  [email or web address]  [phone] or via [employer]  Options provided:  -- AMS confirmed after study  -- AMS treated and resolved  -- AMS ruled out after study  -- Other - I will add my own diagnosis  -- Disagree - Not applicable / Not valid  -- Disagree - Clinically unable to determine / Unknown  -- Refer to Clinical Documentation Reviewer    PROVIDER RESPONSE TEXT:    AMS treated and resolved.    Query created by: Sydnie Velasquez on 2024 9:14

## 2024-08-19 NOTE — PLAN OF CARE
Problem: Physical Therapy - Adult  Goal: By Discharge: Performs mobility at highest level of function for planned discharge setting.  See evaluation for individualized goals.  Description: FUNCTIONAL STATUS PRIOR TO ADMISSION: Patient reports she was able to ambulate around her home with a rollator independently, but occasionally needs help for bed mobility from family. She completes bathing with supervision but Min A for transfers onto shower chair and needs some assist for LB dressing.     HOME SUPPORT PRIOR TO ADMISSION: The patient lived with her son who provides some assistance to her.    Physical Therapy Goals  Initiated 4/16/2024  1.  Patient will move from supine to sit and sit to supine, scoot up and down, and roll side to side in bed with contact guard assist consistently within 7 day(s).    2.  Patient will perform sit to stand with modified independence within 7 day(s).  3.  Patient will transfer from bed to chair and chair to bed with modified independence using the least restrictive device within 7 day(s).  4.  Patient will ambulate with modified independence for 50 feet with the least restrictive device within 7 day(s).   5.  Patient will ascend/descend 4 stairs with handrail(s) with contact guard assist within 7 day(s).    Outcome: Progressing  PHYSICAL THERAPY TREATMENT    Patient: Galilea Aguilar (68 y.o. female)  Date: 4/22/2024  Diagnosis: Hyperkalemia [E87.5]  Lactic acidosis [E87.20]  ESRD on peritoneal dialysis (HCC) [N18.6, Z99.2]  Altered mental status, unspecified altered mental status type [R41.82]  AMS (altered mental status) [R41.82] AMS (altered mental status)      Precautions: Fall Risk                      ASSESSMENT:  Patient continues to benefit from skilled PT services and is progressing towards goals. Galilea tolerated today's session fairly. She was received in bed and agreeable to mobility. She completed bed mobility with up to Min A and reported increased dizziness from supine >  ----- Message from CHARLEY SLATER MA sent at 8/19/2024 10:32 AM EDT -----  Dorinda Cui, ESTELA - CNP  [facility] Staff  Lab results are fine.

## 2025-02-10 NOTE — ANESTHESIA PROCEDURE NOTES
Arterial Line:    An arterial line was placed using surface landmarks, in the pre-op for the following indication(s): continuous blood pressure monitoring. A 20 gauge (size), 1 and 3/4 inch (length), Arrow (type) catheter was placed, Seldinger technique used, into the right radial artery, secured by suture, tape and Tegaderm. Anesthesia type: Local  Local infiltration: Injection    Events:  patient tolerated procedure well with no complications.     Additional notes:  Collateral perfusion verified   Performed: Anesthesiologist   Preanesthetic Checklist  Completed: patient identified, IV checked, risks and benefits discussed, surgical/procedural consents, equipment checked, pre-op evaluation, timeout performed, anesthesia consent given, oxygen available and monitors applied/VS acknowledged lower leg pain/injury

## 2025-06-15 NOTE — PROCEDURES
Procedure Note - Arterial Venous Access:   Performed by Jose Mcleod MD     Diagnosis: Septic shock  Insertion Date: 04/23/24  Time:6:01 PM   Obtained Consent? Emergent  Procedure Location:  ICU.      Immediately prior to the procedure, the patient was reevaluated and found suitable for the planned procedure and any planned medications.  Immediately prior to the procedure a time out was called to verify the correct patient, procedure, equipment, staff, and marking as appropriate.  Chago test to check for collateral flow.     Line Bundle:  Full sterile barrier precautions used.  5 mL 1% Lidocaine placed at insertion site.      The site was prepped with ChloraPrep and Sterile draping.   Catheter inserted into a new site.     Using Seldinger technique a arterial catheter was placed in the Right Femoral with 3 number of attempts for hemodynamic monitoring.    Ultrasound Guidance was utilized.    There was good red pulsatile blood return with waveform on monitor.   Femoral Site? yes. If Yes, reason femoral site was chosen: High vasopressor requirement, small vessels   Catheter secured. Biopatch/CHG bio-occlusive dressing in place? yes.   Complications encountered? no.  The procedure was tolerated well.    Jose Mcleod MD  Critical Care Medicine  [facility]           Shift assessment complete as noted. Patient denies needs. Questions encouraged and answered. Encouraged to call for needs or concerns. Verbalizes understanding.

## (undated) DEVICE — WASTE KIT - ST MARY: Brand: MEDLINE INDUSTRIES, INC.

## (undated) DEVICE — SOL INJ SOD CL 0.9% 500ML BG --

## (undated) DEVICE — AORTIC PUNCHES ARE USED TO CREATE A UNIFORM OPENING IN BLOOD VESSELS DURING CARDIOVASCULAR SURGERY. THE VESSEL GRAFT IS INSERTED INTO THE CREATED OPENING AND SUTURED TO THE VESSEL WALL. AORTIC LANCETS ARE USED TO MAKE A SMALL UNIFORM CUT IN A BLOOD VESSEL TO FACILITATE INSERTION OF AN AORTIC PUNCH.  PUNCHES COME IN VARIOUS LENGTHS, DIAMETERS AND TIP CONFIGURATIONS.: Brand: CLEANCUT ROTATING AORTIC PUNCH

## (undated) DEVICE — CATHETER DIAG 5FR L100CM LUMN ID0.047IN JL3.5 CRV 0 SIDE H

## (undated) DEVICE — GENERAL LAPAROSCOPY-MRMC: Brand: MEDLINE INDUSTRIES, INC.

## (undated) DEVICE — DERMABOND SKIN ADH 0.7ML -- DERMABOND ADVANCED 12/BX

## (undated) DEVICE — NON-REM POLYHESIVE PATIENT RETURN ELECTRODE: Brand: VALLEYLAB

## (undated) DEVICE — RETRACTOR SURG INSRT SUT HLD OCTOBASE

## (undated) DEVICE — GOWN,SIRUS,NONRNF,SETINSLV,2XL,18/CS: Brand: MEDLINE

## (undated) DEVICE — SET GRAV CK VLV NEEDLESS ST 3 GANGED 4WAY STPCOCK HI FLO 10

## (undated) DEVICE — DECANTER BAG 9": Brand: MEDLINE INDUSTRIES, INC.

## (undated) DEVICE — KIT MED IMAG CNTRST AGNT W/ IOPAMIDOL REUSE

## (undated) DEVICE — CATHETER IV 18GA L1.16IN OD1.27-1.3462MM ID0.9398-1.016MM

## (undated) DEVICE — SUTURE SZ 7 L18IN NONABSORBABLE SIL CCS L48MM 1/2 CIR STRNM M655G

## (undated) DEVICE — SOLUTION IV 500ML 0.9% SOD CHL PH 5 INJ USP VIAFLX PLAS

## (undated) DEVICE — 1000ML,PRESSURE INFUSER W/STOPCOCK: Brand: MEDLINE

## (undated) DEVICE — SUTURE DEK POLY GRN BR SZ3 0 T 2 2N 6716

## (undated) DEVICE — CATHETER IV 22GA L1IN OD0.8382-0.9144MM ID0.6096-0.6858MM 382523

## (undated) DEVICE — BLOCK BITE ENDOSCP AD 21 MM W/ DIL BLU LF DISP

## (undated) DEVICE — KIT COR DIAG CATH ANGIO 5FR CURVES JL 4.0 100CM JR 4.0

## (undated) DEVICE — FORCEPS BX L240CM JAW DIA2.4MM ORNG L CAP W/ NDL DISP RAD

## (undated) DEVICE — CONTAINER SPEC 20 ML LID NEUT BUFF FORMALIN 10 % POLYPR STS

## (undated) DEVICE — SUTURE PROL SZ 5-0 L30IN NONABSORBABLE BLU L13MM RB-2 1/2 8710H

## (undated) DEVICE — YANKAUER,TAPERED BULBOUS TIP,W/O VENT: Brand: MEDLINE

## (undated) DEVICE — SOLUTION IV 1000ML 140MEQ/L SOD 5MEQ/L K 3MEQ/L MG 27MEQ/L

## (undated) DEVICE — KIT AT-X65 ANGIOTOUCH HAND CONTROLLER

## (undated) DEVICE — DRAIN SURG SGL COLL PT TB FOR ATS BG OASIS

## (undated) DEVICE — SUTURE PROL SZ 2-0 L36IN NONABSORBABLE BLU RB-1 L17MM 1/2 8559H

## (undated) DEVICE — SYSTEM ENDOSCP VES HARV W/ TOOL CANN SEAL SHT PRT BLNT TIP

## (undated) DEVICE — DRESSING FOAM W8.7XL9.1IN SAFETAC LAYR SELF ADH MEPILEX

## (undated) DEVICE — SYRINGE MED 10ML LUERLOCK TIP W/O SFTY DISP

## (undated) DEVICE — 6 FOOT DISPOSABLE EXTENSION CABLE WITH SAFE CONNECT / SCREW-DOWN

## (undated) DEVICE — LAPAROSCOPIC TROCAR SLEEVE/SINGLE USE: Brand: KII® OPTICAL ACCESS SYSTEM

## (undated) DEVICE — TRAP,MUCUS SPECIMEN, 80CC: Brand: MEDLINE

## (undated) DEVICE — CANNULA SUCT L8.5IN DIA20FR VENT CONN 3/8IN ART MTL CRV TIP

## (undated) DEVICE — LIQUIBAND RAPID ADHESIVE 36/CS 0.8ML: Brand: MEDLINE

## (undated) DEVICE — KIT HND CTRL 3 W STPCOCK ROT END 54IN PREM HI PRSS TBNG AT

## (undated) DEVICE — SPLINT WR VELC FOAM NEUT POS DISP FOR RAD ART ACC SFT STRP

## (undated) DEVICE — KIT MFLD ISOLATN NACL CNTRST PRT TBNG SPIK W/ PRSS TRNSDUC

## (undated) DEVICE — IV START KIT: Brand: MEDLINE

## (undated) DEVICE — 1200 GUARD II KIT W/5MM TUBE W/O VAC TUBE: Brand: GUARDIAN

## (undated) DEVICE — TOWEL 4 PLY TISS 19X30 SUE WHT

## (undated) DEVICE — ANGIOGRAPHY KIT

## (undated) DEVICE — SUTURE VCRL SZ 2-0 L27IN ABSRB UD L26MM SH 1/2 CIR J417H

## (undated) DEVICE — SET ADMIN 16ML TBNG L100IN 2 Y INJ SITE IV PIGGY BK DISP (ORDER IN MULIPLES OF 48)

## (undated) DEVICE — TIDISHIELD TRANSPORT, CONTAINMENT COVER FOR BACK TABLE 4'6" (1.37M) TO 8' (2.43M) IN LENGTH: Brand: TIDISHIELD

## (undated) DEVICE — (D)AGENT INJECTN ELEVIEW 10ML -- DISC BY MFG

## (undated) DEVICE — CATHETER IV 24GA L0.75IN OD0.6604-0.7366MM

## (undated) DEVICE — BLADE OPHTH 180DEG CUT SURF BLU STR SHRP DBL BVL GRINDLESS

## (undated) DEVICE — THIS ADAPTER IS A DOUBLE SEALING FEMALE LUER LOCK ADAPTER WITH A 2-PIECE, COMBINATION COMPRESSION FIT/BARBED CATHETER CONNECTOR. THE ADAPTER IS USED TO CONNECT THE PD CATHETER TO A SOLUTION TRANSFER SET WITH LOCKING CONNECTOR.: Brand: LOCKING TITANIUM ADAPTER FOR PERITONEAL DIALYSIS CATHETER

## (undated) DEVICE — KIT,ANTI FOG,W/SPONGE & FLUID,SOFT PACK: Brand: MEDLINE

## (undated) DEVICE — OPEN HEART B-RICHMOND: Brand: MEDLINE INDUSTRIES, INC.

## (undated) DEVICE — GOWN,SIRUS,NONRNF,SETINSLV,XL,20/CS: Brand: MEDLINE

## (undated) DEVICE — STRAINER URIN CALC RNL MSH -- CONVERT TO ITEM 357634

## (undated) DEVICE — 40418 TRENDELENBURG ONE-STEP ARM PROTECTORS LARGE (1 PAIR): Brand: 40418 TRENDELENBURG ONE-STEP ARM PROTECTORS LARGE (1 PAIR)

## (undated) DEVICE — PRESSURE MONITORING SET: Brand: TRUWAVE

## (undated) DEVICE — Z DISCONTINUED PER MEDLINE LINE GAS SAMPLING O2/CO2 LNG AD 13 FT NSL W/ TBNG FILTERLINE

## (undated) DEVICE — SUTURE PROL SZ 0 L30IN NONABSORBABLE BLU L26MM CT-2 1/2 CIR 8412H

## (undated) DEVICE — TBG INSUFFLATION LUER LOCK: Brand: MEDLINE INDUSTRIES, INC.

## (undated) DEVICE — TROCAR: Brand: KII® SLEEVE

## (undated) DEVICE — SUTURE SZ 0 27IN 5/8 CIR UR-6  TAPER PT VIOLET ABSRB VICRYL J603H

## (undated) DEVICE — HYPODERMIC SAFETY NEEDLE: Brand: MAGELLAN

## (undated) DEVICE — SNARE ENDOSCP M L240CM W27MM SHTH DIA2.4MM CHN 2.8MM OVL

## (undated) DEVICE — SYRINGE MED 3ML CLR PLAS STD N CTRL LUERLOCK TIP DISP

## (undated) DEVICE — Device

## (undated) DEVICE — DRAIN,WOUND,ROUND,24FR,5/16",FULL-FLUTED: Brand: MEDLINE

## (undated) DEVICE — TROCAR: Brand: KII FIOS FIRST ENTRY

## (undated) DEVICE — CATHETER IV 20GA L1.16IN OD1.0414-1.1176MM ID0.762-0.8382MM

## (undated) DEVICE — Device: Brand: JELCO

## (undated) DEVICE — ADHESIVE SKIN CLOSURE XL 42 CM 2.7 CC MESH LIQUIBAND SECUR

## (undated) DEVICE — SHEARS ENDOSCP L36CM DIA5MM ULTRASONIC CRV TIP W/ ADV

## (undated) DEVICE — AVID DUAL STAGE VENOUS DRAINAGE CANNULA: Brand: AVID DUAL STAGE VENOUS DRAINAGE CANNULA

## (undated) DEVICE — 3M™ TEGADERM™ TRANSPARENT FILM DRESSING FRAME STYLE, 1626W, 4 IN X 4-3/4 IN (10 CM X 12 CM), 50/CT 4CT/CASE: Brand: 3M™ TEGADERM™

## (undated) DEVICE — Z DISCONTINUED NO SUB IDED CATHETER IV 14GA L2IN POLYUR STR ORNG HUB SFTY RADPQ DISP

## (undated) DEVICE — BANDAGE COMPR ELASTIC 5 YDX6 IN

## (undated) DEVICE — LEAD PACE L475MM CHNL A OR V MYOCARDIAL STEROID ELUT SIL

## (undated) DEVICE — BLADE OPHTH KNF D3MM 15DEG CATRCT BLU MICRO-SHARP

## (undated) DEVICE — AEGIS 1" DISK 4MM HOLE, PEEL OPEN: Brand: MEDLINE

## (undated) DEVICE — SUTURE MCRYL SZ 3-0 L27IN ABSRB UD L24MM PS-1 3/8 CIR PRIM Y936H

## (undated) DEVICE — SOLUTION IV 1000ML 0.9% SOD CHL PH 5 INJ USP VIAFLX PLAS

## (undated) DEVICE — TRANSFER BAG 300 ML: Brand: HAEMONETICS

## (undated) DEVICE — COVER,LIGHT,CAMERA,HARD,1/PK,STRL: Brand: MEDLINE

## (undated) DEVICE — SUTURE MCRYL SZ 4-0 L27IN ABSRB UD L19MM PS-2 1/2 CIR PRIM Y426H

## (undated) DEVICE — SUTURE VCRL SZ 3-0 L27IN ABSRB UD L26MM SH 1/2 CIR J416H

## (undated) DEVICE — GLOVE ORTHO 8   MSG9480

## (undated) DEVICE — BANDAGE COMPR M W6INXL10YD WHT BGE VELC E MTRX HK AND LOOP

## (undated) DEVICE — GLOVE SURG SZ 7.5 L11.2IN THK9.8MIL STRW LTX POLYMER BEAD

## (undated) DEVICE — CARD SMRT DISP TRANSIT TIME MEDISTEM VERIQ

## (undated) DEVICE — PACK PROCEDURE SURG HRT CATH

## (undated) DEVICE — BAG SPEC BIOHZRD 10 X 10 IN --

## (undated) DEVICE — DRAPE SLUSH DISC W44XL66IN ST FOR RND BSIN HUSH SLUSH SYS

## (undated) DEVICE — STAIN INDIA INK IN NACL 10ML --

## (undated) DEVICE — NDL INJ SCLERO 25G 240CM -- INTERJECT M00518310 BX/5

## (undated) DEVICE — SPECIAL PROCEDURE DRAPE 32" X 34": Brand: SPECIAL PROCEDURE DRAPE

## (undated) DEVICE — NEONATAL-ADULT SPO2 SENSOR: Brand: NELLCOR

## (undated) DEVICE — SUTURE PROL SZ 6-0 L24IN NONABSORBABLE BLU L13MM C-1 3/8 8726H

## (undated) DEVICE — OPEN HEART A- RICHMOND: Brand: MEDLINE INDUSTRIES, INC.

## (undated) DEVICE — CATH IV AUTOGRD BC PNK 20GA 25 -- INSYTE

## (undated) DEVICE — ABSORBABLE COLLAGEN HEMOSTATIC SPONGE, 3IN X 4IN, 5MM THICK: Brand: HELISTAT ® ABSORBABLE COLLAGEN HEMOSTATIC SPONGE

## (undated) DEVICE — SUTURE NONABSORBABLE MONOFILAMENT 7-0 BV-1 1X24 IN PROLENE 8702H

## (undated) DEVICE — FORCEPS BX L160CM DIA8MM GRSP DISECT CUP TIP NONLOCKING ROT

## (undated) DEVICE — BASIN EMSIS 16OZ GRAPHITE PLAS KID SHP MOLD GRAD FOR ORAL

## (undated) DEVICE — KIT BLWR MISTER 5P 15L W/ TBNG SET IRRIG MIST TO IMPROVE

## (undated) DEVICE — SYSTEM REPROC CBL 3 LD DISPOSABLE

## (undated) DEVICE — ELECTRODE,RADIOTRANSLUCENT,FOAM,5PK: Brand: MEDLINE

## (undated) DEVICE — SUTURE PROL SZ 8-0 L24IN NONABSORBABLE BLU L6.5MM BV130-5 8732H

## (undated) DEVICE — SUTURE PROL SZ 4-0 L36IN NONABSORBABLE BLU L26MM SH 1/2 CIR 8521H

## (undated) DEVICE — GLIDESHEATH SLENDER STAINLESS STEEL KIT: Brand: GLIDESHEATH SLENDER

## (undated) DEVICE — PROVE COVER: Brand: UNBRANDED

## (undated) DEVICE — SYRINGE MED 50ML LUERLOCK TIP

## (undated) DEVICE — SOLUTION IV 1000ML PH 7.4 INJ NRMSOL R

## (undated) DEVICE — HYPODERMIC SAFETY NEEDLE: Brand: MONOJECT

## (undated) DEVICE — CONNECTOR DRNGE 3/8 1/2X3/16X3/16IN BASE L5MM ARM L10-13MM

## (undated) DEVICE — SUTURE VCRL 0 L27IN ABSRB UD CT L40MM 1/2 CIR TAPERPOINT J280H

## (undated) DEVICE — TR BAND RADIAL ARTERY COMPRESSION DEVICE: Brand: TR BAND

## (undated) DEVICE — SOLIDIFIER FLD 2OZ 1500CC N DISINF IN BTL DISP SAFESORB